# Patient Record
Sex: MALE | Race: BLACK OR AFRICAN AMERICAN | NOT HISPANIC OR LATINO | Employment: FULL TIME | URBAN - METROPOLITAN AREA
[De-identification: names, ages, dates, MRNs, and addresses within clinical notes are randomized per-mention and may not be internally consistent; named-entity substitution may affect disease eponyms.]

---

## 2023-03-23 ENCOUNTER — PATIENT OUTREACH (OUTPATIENT)
Dept: CARE COORDINATION | Facility: CLINIC | Age: 23
End: 2023-03-23
Payer: COMMERCIAL

## 2023-06-15 ENCOUNTER — PATIENT OUTREACH (OUTPATIENT)
Dept: CARE COORDINATION | Facility: CLINIC | Age: 23
End: 2023-06-15
Payer: COMMERCIAL

## 2023-06-15 NOTE — PROGRESS NOTES
Outreach call to patient to support a smooth transition of care from recent admission.  Left voicemail message for patient with my contact information.    Audrey Goyal RN/CM

## 2023-07-03 ENCOUNTER — PATIENT OUTREACH (OUTPATIENT)
Dept: CARE COORDINATION | Facility: CLINIC | Age: 23
End: 2023-07-03
Payer: COMMERCIAL

## 2023-07-03 SDOH — SOCIAL STABILITY: SOCIAL NETWORK: ARE YOU MARRIED, WIDOWED, DIVORCED, SEPARATED, NEVER MARRIED, OR LIVING WITH A PARTNER?: NEVER MARRIED

## 2023-07-03 SDOH — ECONOMIC STABILITY: TRANSPORTATION INSECURITY
IN THE PAST 12 MONTHS, HAS THE LACK OF TRANSPORTATION KEPT YOU FROM MEDICAL APPOINTMENTS OR FROM GETTING MEDICATIONS?: NO

## 2023-07-03 SDOH — ECONOMIC STABILITY: GENERAL: WOULD YOU LIKE HELP WITH ANY OF THE FOLLOWING NEEDS?: I DONT NEED HELP WITH ANY OF THESE

## 2023-07-03 NOTE — PROGRESS NOTES
[place]  [place]    [facility] [place] foot pain thought to be from overuse.  No acute findings other than soft tissue swelling.     Engagement  Call Start Time: 1447 (7/3/2023  2:47 PM)    Medications  Medications reviewed with patient/caregiver?: Yes (7/3/2023  2:47 PM)  Is the patient having any side effects they believe may be caused by any medication additions or changes?: No (7/3/2023  2:47 PM)  Does the patient have all medications ordered at discharge?: Yes (7/3/2023  2:47 PM)  Is the patient taking all medications as directed (includes completed medication regime)?: Yes (7/3/2023  2:47 PM)    Appointments  Does the patient have a primary care provider?: Yes (7/3/2023  2:47 PM)  Care Management Interventions: Verified appointment date/time/provider (7/3/2023  2:47 PM)  Has the patient kept scheduled appointments due by today?: No (7/3/2023  2:47 PM)  Care Management Interventions: Advised patient to keep appointment (7/3/2023  2:47 PM)    Patient Teaching  Does the patient have access to their discharge instructions?: Yes (7/3/2023  2:47 PM)  Care Management Interventions: Reviewed instructions with patient (7/3/2023  2:47 PM)  What is the patient's perception of their health status since discharge?: Improving (7/3/2023  2:47 PM)    Wrap Up  Call End Time: 1449 (7/3/2023  2:47 PM)      Outreach to patient for completion of transition of care.  Patient states he went to work today at "[employer],", but took desk duties due to foot injury.    educated patient to rest, elevate foot, ice, compress area with ace wrap or shoe, and to take Tylenol as prescribed..  Patient voiced understanding of above.     Maddie SHEPPARD RN CCM  RN Care Coordinator  [facility]  Phone [phone]

## 2023-07-19 ENCOUNTER — PATIENT OUTREACH (OUTPATIENT)
Dept: CARE COORDINATION | Facility: CLINIC | Age: 23
End: 2023-07-19
Payer: COMMERCIAL

## 2023-07-19 NOTE — PROGRESS NOTES
Outreach call to patient to support a smooth transition of care from recent admission.  Spoke with patient, he states he is doing good. Patient states his sickle cell is well controlled. Patient states, his pain is controlled. No needs at this time. Patient thanks  for calling.    Audrey Goyal RN/CM

## 2023-10-21 ENCOUNTER — HOSPITAL ENCOUNTER (EMERGENCY)
Facility: HOSPITAL | Age: 23
Discharge: HOME | End: 2023-10-21
Attending: EMERGENCY MEDICINE
Payer: COMMERCIAL

## 2023-10-21 ENCOUNTER — APPOINTMENT (OUTPATIENT)
Dept: RADIOLOGY | Facility: HOSPITAL | Age: 23
End: 2023-10-21
Payer: COMMERCIAL

## 2023-10-21 VITALS
SYSTOLIC BLOOD PRESSURE: 105 MMHG | DIASTOLIC BLOOD PRESSURE: 79 MMHG | TEMPERATURE: 98.6 F | RESPIRATION RATE: 18 BRPM | WEIGHT: 130 LBS | OXYGEN SATURATION: 97 % | HEIGHT: 73 IN | HEART RATE: 84 BPM | BODY MASS INDEX: 17.23 KG/M2

## 2023-10-21 DIAGNOSIS — D57.00 SICKLE CELL DISEASE WITH CRISIS (MULTI): Primary | ICD-10-CM

## 2023-10-21 LAB
ALBUMIN SERPL BCP-MCNC: 4.5 G/DL (ref 3.4–5)
ALP SERPL-CCNC: 88 U/L (ref 33–120)
ALT SERPL W P-5'-P-CCNC: 30 U/L (ref 10–52)
ANION GAP SERPL CALC-SCNC: 14 MMOL/L (ref 10–20)
AST SERPL W P-5'-P-CCNC: 67 U/L (ref 9–39)
BASO STIPL BLD QL SMEAR: PRESENT
BASOPHILS # BLD MANUAL: 0.35 X10*3/UL (ref 0–0.1)
BASOPHILS NFR BLD MANUAL: 2.6 %
BILIRUB SERPL-MCNC: 8.7 MG/DL (ref 0–1.2)
BUN SERPL-MCNC: 7 MG/DL (ref 6–23)
CALCIUM SERPL-MCNC: 9.4 MG/DL (ref 8.6–10.6)
CHLORIDE SERPL-SCNC: 107 MMOL/L (ref 98–107)
CO2 SERPL-SCNC: 22 MMOL/L (ref 21–32)
CREAT SERPL-MCNC: 0.63 MG/DL (ref 0.5–1.3)
EOSINOPHIL # BLD MANUAL: 0 X10*3/UL (ref 0–0.7)
EOSINOPHIL NFR BLD MANUAL: 0 %
ERYTHROCYTE [DISTWIDTH] IN BLOOD BY AUTOMATED COUNT: 27.4 % (ref 11.5–14.5)
GFR SERPL CREATININE-BSD FRML MDRD: >90 ML/MIN/1.73M*2
GLUCOSE SERPL-MCNC: 96 MG/DL (ref 74–99)
HCT VFR BLD AUTO: 19.5 % (ref 41–52)
HGB BLD-MCNC: 7.1 G/DL (ref 13.5–17.5)
HGB RETIC QN: 30 PG (ref 28–38)
IMM GRANULOCYTES # BLD AUTO: 0.08 X10*3/UL (ref 0–0.7)
IMM GRANULOCYTES NFR BLD AUTO: 0.6 % (ref 0–0.9)
IMMATURE RETIC FRACTION: 45 %
LDH SERPL L TO P-CCNC: 414 U/L (ref 84–246)
LYMPHOCYTES # BLD MANUAL: 4.59 X10*3/UL (ref 1.2–4.8)
LYMPHOCYTES NFR BLD MANUAL: 34.5 %
MCH RBC QN AUTO: 27 PG (ref 26–34)
MCHC RBC AUTO-ENTMCNC: 36.4 G/DL (ref 32–36)
MCV RBC AUTO: 74 FL (ref 80–100)
MONOCYTES # BLD MANUAL: 0.23 X10*3/UL (ref 0.1–1)
MONOCYTES NFR BLD MANUAL: 1.7 %
NEUTS SEG # BLD MANUAL: 8.02 X10*3/UL (ref 1.2–7)
NEUTS SEG NFR BLD MANUAL: 60.3 %
NRBC BLD-RTO: 3.3 /100 WBCS (ref 0–0)
PLATELET # BLD AUTO: 345 X10*3/UL (ref 150–450)
PMV BLD AUTO: 9.6 FL (ref 7.5–11.5)
POLYCHROMASIA BLD QL SMEAR: ABNORMAL
POTASSIUM SERPL-SCNC: 3.6 MMOL/L (ref 3.5–5.3)
PROT SERPL-MCNC: 6.7 G/DL (ref 6.4–8.2)
RBC # BLD AUTO: 2.63 X10*6/UL (ref 4.5–5.9)
RBC MORPH BLD: ABNORMAL
RETICS #: 0.33 X10*6/UL (ref 0.02–0.12)
RETICS/RBC NFR AUTO: 12.5 % (ref 0.5–2)
SICKLE CELLS BLD QL SMEAR: ABNORMAL
SODIUM SERPL-SCNC: 139 MMOL/L (ref 136–145)
TARGETS BLD QL SMEAR: ABNORMAL
TOTAL CELLS COUNTED BLD: 116
VARIANT LYMPHS # BLD MANUAL: 0.12 X10*3/UL (ref 0–0.5)
VARIANT LYMPHS NFR BLD: 0.9 %
WBC # BLD AUTO: 13.3 X10*3/UL (ref 4.4–11.3)

## 2023-10-21 PROCEDURE — 80053 COMPREHEN METABOLIC PANEL: CPT | Mod: CMCLAB

## 2023-10-21 PROCEDURE — 93005 ELECTROCARDIOGRAM TRACING: CPT

## 2023-10-21 PROCEDURE — 99284 EMERGENCY DEPT VISIT MOD MDM: CPT | Performed by: EMERGENCY MEDICINE

## 2023-10-21 PROCEDURE — 96375 TX/PRO/DX INJ NEW DRUG ADDON: CPT

## 2023-10-21 PROCEDURE — 71046 X-RAY EXAM CHEST 2 VIEWS: CPT

## 2023-10-21 PROCEDURE — 96376 TX/PRO/DX INJ SAME DRUG ADON: CPT

## 2023-10-21 PROCEDURE — 36415 COLL VENOUS BLD VENIPUNCTURE: CPT | Mod: CMCLAB

## 2023-10-21 PROCEDURE — 2500000001 HC RX 250 WO HCPCS SELF ADMINISTERED DRUGS (ALT 637 FOR MEDICARE OP)

## 2023-10-21 PROCEDURE — 2500000004 HC RX 250 GENERAL PHARMACY W/ HCPCS (ALT 636 FOR OP/ED)

## 2023-10-21 PROCEDURE — 83615 LACTATE (LD) (LDH) ENZYME: CPT | Mod: CMCLAB

## 2023-10-21 PROCEDURE — 96374 THER/PROPH/DIAG INJ IV PUSH: CPT

## 2023-10-21 PROCEDURE — 85007 BL SMEAR W/DIFF WBC COUNT: CPT

## 2023-10-21 PROCEDURE — 71046 X-RAY EXAM CHEST 2 VIEWS: CPT | Performed by: RADIOLOGY

## 2023-10-21 PROCEDURE — 85027 COMPLETE CBC AUTOMATED: CPT

## 2023-10-21 PROCEDURE — 85045 AUTOMATED RETICULOCYTE COUNT: CPT

## 2023-10-21 RX ORDER — HYDROMORPHONE HYDROCHLORIDE 1 MG/ML
1 INJECTION, SOLUTION INTRAMUSCULAR; INTRAVENOUS; SUBCUTANEOUS EVERY 30 MIN PRN
Status: DISCONTINUED | OUTPATIENT
Start: 2023-10-21 | End: 2023-10-21 | Stop reason: HOSPADM

## 2023-10-21 RX ORDER — DIPHENHYDRAMINE HCL 25 MG
25 CAPSULE ORAL ONCE
Status: COMPLETED | OUTPATIENT
Start: 2023-10-21 | End: 2023-10-21

## 2023-10-21 RX ORDER — ONDANSETRON HYDROCHLORIDE 2 MG/ML
4 INJECTION, SOLUTION INTRAVENOUS ONCE
Status: COMPLETED | OUTPATIENT
Start: 2023-10-21 | End: 2023-10-21

## 2023-10-21 RX ADMIN — HYDROMORPHONE HYDROCHLORIDE 1 MG: 1 INJECTION, SOLUTION INTRAMUSCULAR; INTRAVENOUS; SUBCUTANEOUS at 06:09

## 2023-10-21 RX ADMIN — ONDANSETRON 4 MG: 2 INJECTION INTRAMUSCULAR; INTRAVENOUS at 03:10

## 2023-10-21 RX ADMIN — HYDROMORPHONE HYDROCHLORIDE 1 MG: 1 INJECTION, SOLUTION INTRAMUSCULAR; INTRAVENOUS; SUBCUTANEOUS at 03:10

## 2023-10-21 RX ADMIN — DIPHENHYDRAMINE HYDROCHLORIDE 25 MG: 25 CAPSULE ORAL at 03:10

## 2023-10-21 ASSESSMENT — COLUMBIA-SUICIDE SEVERITY RATING SCALE - C-SSRS
1. IN THE PAST MONTH, HAVE YOU WISHED YOU WERE DEAD OR WISHED YOU COULD GO TO SLEEP AND NOT WAKE UP?: NO
6. HAVE YOU EVER DONE ANYTHING, STARTED TO DO ANYTHING, OR PREPARED TO DO ANYTHING TO END YOUR LIFE?: NO
2. HAVE YOU ACTUALLY HAD ANY THOUGHTS OF KILLING YOURSELF?: NO

## 2023-10-21 ASSESSMENT — PAIN - FUNCTIONAL ASSESSMENT: PAIN_FUNCTIONAL_ASSESSMENT: 0-10

## 2023-10-21 ASSESSMENT — LIFESTYLE VARIABLES
HAVE PEOPLE ANNOYED YOU BY CRITICIZING YOUR DRINKING: NO
HAVE YOU EVER FELT YOU SHOULD CUT DOWN ON YOUR DRINKING: NO
EVER HAD A DRINK FIRST THING IN THE MORNING TO STEADY YOUR NERVES TO GET RID OF A HANGOVER: NO
EVER FELT BAD OR GUILTY ABOUT YOUR DRINKING: NO
REASON UNABLE TO ASSESS: NO

## 2023-10-21 ASSESSMENT — PAIN SCALES - GENERAL
PAINLEVEL_OUTOF10: 5 - MODERATE PAIN
PAINLEVEL_OUTOF10: 10 - WORST POSSIBLE PAIN
PAINLEVEL_OUTOF10: 10 - WORST POSSIBLE PAIN

## 2023-10-21 ASSESSMENT — PAIN DESCRIPTION - DESCRIPTORS: DESCRIPTORS: JABBING
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
## 2023-10-21 NOTE — ED TRIAGE NOTES
Patient presents to the ED for sickle cell pain, endorsing pain to chest with stabbing pain that started a few hours ago. Also endorsing pain to back

## 2023-10-27 ENCOUNTER — CLINICAL SUPPORT (OUTPATIENT)
Dept: EMERGENCY MEDICINE | Facility: HOSPITAL | Age: 23
End: 2023-10-27
Payer: COMMERCIAL

## 2023-10-27 LAB
ATRIAL RATE: 85 BPM
P AXIS: 49 DEGREES
P OFFSET: 152 MS
P ONSET: 128 MS
PR INTERVAL: 168 MS
Q ONSET: 212 MS
QRS COUNT: 13 BEATS
QRS DURATION: 94 MS
QT INTERVAL: 370 MS
QTC CALCULATION(BAZETT): 440 MS
QTC FREDERICIA: 415 MS
R AXIS: 83 DEGREES
T AXIS: -61 DEGREES
T OFFSET: 397 MS
VENTRICULAR RATE: 85 BPM

## 2023-11-22 PROCEDURE — 96376 TX/PRO/DX INJ SAME DRUG ADON: CPT

## 2023-11-22 PROCEDURE — 99284 EMERGENCY DEPT VISIT MOD MDM: CPT | Mod: 25

## 2023-11-22 PROCEDURE — 99285 EMERGENCY DEPT VISIT HI MDM: CPT

## 2023-11-22 PROCEDURE — 96375 TX/PRO/DX INJ NEW DRUG ADDON: CPT

## 2023-11-22 PROCEDURE — 96374 THER/PROPH/DIAG INJ IV PUSH: CPT

## 2023-11-22 PROCEDURE — 99285 EMERGENCY DEPT VISIT HI MDM: CPT | Performed by: EMERGENCY MEDICINE

## 2023-11-23 ENCOUNTER — HOSPITAL ENCOUNTER (EMERGENCY)
Facility: HOSPITAL | Age: 23
Discharge: HOME | End: 2023-11-23
Payer: COMMERCIAL

## 2023-11-23 VITALS
RESPIRATION RATE: 18 BRPM | DIASTOLIC BLOOD PRESSURE: 67 MMHG | HEART RATE: 70 BPM | OXYGEN SATURATION: 98 % | SYSTOLIC BLOOD PRESSURE: 131 MMHG | BODY MASS INDEX: 18.61 KG/M2 | HEIGHT: 74 IN | TEMPERATURE: 99.8 F | WEIGHT: 145 LBS

## 2023-11-23 DIAGNOSIS — D57.00 SICKLE CELL PAIN CRISIS (MULTI): Primary | ICD-10-CM

## 2023-11-23 LAB
ALBUMIN SERPL BCP-MCNC: 4.4 G/DL (ref 3.4–5)
ALP SERPL-CCNC: 81 U/L (ref 33–120)
ALT SERPL W P-5'-P-CCNC: 23 U/L (ref 10–52)
ANION GAP SERPL CALC-SCNC: 12 MMOL/L (ref 10–20)
AST SERPL W P-5'-P-CCNC: 42 U/L (ref 9–39)
BASOPHILS # BLD MANUAL: 0 X10*3/UL (ref 0–0.1)
BASOPHILS NFR BLD MANUAL: 0 %
BILIRUB SERPL-MCNC: 5.6 MG/DL (ref 0–1.2)
BUN SERPL-MCNC: 9 MG/DL (ref 6–23)
CALCIUM SERPL-MCNC: 9.4 MG/DL (ref 8.6–10.6)
CHLORIDE SERPL-SCNC: 109 MMOL/L (ref 98–107)
CO2 SERPL-SCNC: 23 MMOL/L (ref 21–32)
CREAT SERPL-MCNC: 0.58 MG/DL (ref 0.5–1.3)
EOSINOPHIL # BLD MANUAL: 0.12 X10*3/UL (ref 0–0.7)
EOSINOPHIL NFR BLD MANUAL: 0.9 %
ERYTHROCYTE [DISTWIDTH] IN BLOOD BY AUTOMATED COUNT: 28 % (ref 11.5–14.5)
GFR SERPL CREATININE-BSD FRML MDRD: >90 ML/MIN/1.73M*2
GLUCOSE SERPL-MCNC: 82 MG/DL (ref 74–99)
HCT VFR BLD AUTO: 19.8 % (ref 41–52)
HGB BLD-MCNC: 7.2 G/DL (ref 13.5–17.5)
HGB RETIC QN: 28 PG (ref 28–38)
HYPOCHROMIA BLD QL SMEAR: ABNORMAL
IMM GRANULOCYTES # BLD AUTO: 0.07 X10*3/UL (ref 0–0.7)
IMM GRANULOCYTES NFR BLD AUTO: 0.5 % (ref 0–0.9)
IMMATURE RETIC FRACTION: 38.2 %
LDH SERPL L TO P-CCNC: 356 U/L (ref 84–246)
LYMPHOCYTES # BLD MANUAL: 8.03 X10*3/UL (ref 1.2–4.8)
LYMPHOCYTES NFR BLD MANUAL: 59.5 %
MCH RBC QN AUTO: 25.6 PG (ref 26–34)
MCHC RBC AUTO-ENTMCNC: 36.4 G/DL (ref 32–36)
MCV RBC AUTO: 71 FL (ref 80–100)
MONOCYTES # BLD MANUAL: 0.58 X10*3/UL (ref 0.1–1)
MONOCYTES NFR BLD MANUAL: 4.3 %
NEUTS SEG # BLD MANUAL: 4.77 X10*3/UL (ref 1.2–7)
NEUTS SEG NFR BLD MANUAL: 35.3 %
NRBC BLD-RTO: 0.4 /100 WBCS (ref 0–0)
PLATELET # BLD AUTO: 346 X10*3/UL (ref 150–450)
POLYCHROMASIA BLD QL SMEAR: ABNORMAL
POTASSIUM SERPL-SCNC: 3.6 MMOL/L (ref 3.5–5.3)
PROT SERPL-MCNC: 7.2 G/DL (ref 6.4–8.2)
RBC # BLD AUTO: 2.81 X10*6/UL (ref 4.5–5.9)
RBC MORPH BLD: ABNORMAL
RETICS #: 0.4 X10*6/UL (ref 0.02–0.12)
RETICS/RBC NFR AUTO: 14.4 % (ref 0.5–2)
SCHISTOCYTES BLD QL SMEAR: ABNORMAL
SICKLE CELLS BLD QL SMEAR: ABNORMAL
SODIUM SERPL-SCNC: 140 MMOL/L (ref 136–145)
TARGETS BLD QL SMEAR: ABNORMAL
TOTAL CELLS COUNTED BLD: 116
WBC # BLD AUTO: 13.5 X10*3/UL (ref 4.4–11.3)

## 2023-11-23 PROCEDURE — 36415 COLL VENOUS BLD VENIPUNCTURE: CPT | Performed by: STUDENT IN AN ORGANIZED HEALTH CARE EDUCATION/TRAINING PROGRAM

## 2023-11-23 PROCEDURE — 85045 AUTOMATED RETICULOCYTE COUNT: CPT | Performed by: STUDENT IN AN ORGANIZED HEALTH CARE EDUCATION/TRAINING PROGRAM

## 2023-11-23 PROCEDURE — 94760 N-INVAS EAR/PLS OXIMETRY 1: CPT

## 2023-11-23 PROCEDURE — 85027 COMPLETE CBC AUTOMATED: CPT | Performed by: STUDENT IN AN ORGANIZED HEALTH CARE EDUCATION/TRAINING PROGRAM

## 2023-11-23 PROCEDURE — 85007 BL SMEAR W/DIFF WBC COUNT: CPT | Performed by: STUDENT IN AN ORGANIZED HEALTH CARE EDUCATION/TRAINING PROGRAM

## 2023-11-23 PROCEDURE — 2500000004 HC RX 250 GENERAL PHARMACY W/ HCPCS (ALT 636 FOR OP/ED): Performed by: STUDENT IN AN ORGANIZED HEALTH CARE EDUCATION/TRAINING PROGRAM

## 2023-11-23 PROCEDURE — 83615 LACTATE (LD) (LDH) ENZYME: CPT | Performed by: STUDENT IN AN ORGANIZED HEALTH CARE EDUCATION/TRAINING PROGRAM

## 2023-11-23 PROCEDURE — 80053 COMPREHEN METABOLIC PANEL: CPT | Performed by: STUDENT IN AN ORGANIZED HEALTH CARE EDUCATION/TRAINING PROGRAM

## 2023-11-23 RX ORDER — KETOROLAC TROMETHAMINE 15 MG/ML
15 INJECTION, SOLUTION INTRAMUSCULAR; INTRAVENOUS ONCE
Status: COMPLETED | OUTPATIENT
Start: 2023-11-23 | End: 2023-11-23

## 2023-11-23 RX ORDER — HYDROMORPHONE HYDROCHLORIDE 1 MG/ML
1 INJECTION, SOLUTION INTRAMUSCULAR; INTRAVENOUS; SUBCUTANEOUS ONCE
Status: COMPLETED | OUTPATIENT
Start: 2023-11-23 | End: 2023-11-23

## 2023-11-23 RX ORDER — HYDROMORPHONE HYDROCHLORIDE 1 MG/ML
1 INJECTION, SOLUTION INTRAMUSCULAR; INTRAVENOUS; SUBCUTANEOUS
Status: COMPLETED | OUTPATIENT
Start: 2023-11-23 | End: 2023-11-23

## 2023-11-23 RX ADMIN — HYDROMORPHONE HYDROCHLORIDE 1 MG: 1 INJECTION, SOLUTION INTRAMUSCULAR; INTRAVENOUS; SUBCUTANEOUS at 03:55

## 2023-11-23 RX ADMIN — KETOROLAC TROMETHAMINE 15 MG: 15 INJECTION, SOLUTION INTRAMUSCULAR; INTRAVENOUS at 03:11

## 2023-11-23 RX ADMIN — HYDROMORPHONE HYDROCHLORIDE 1 MG: 1 INJECTION, SOLUTION INTRAMUSCULAR; INTRAVENOUS; SUBCUTANEOUS at 02:00

## 2023-11-23 RX ADMIN — HYDROMORPHONE HYDROCHLORIDE 1 MG: 1 INJECTION, SOLUTION INTRAMUSCULAR; INTRAVENOUS; SUBCUTANEOUS at 02:32

## 2023-11-23 RX ADMIN — HYDROMORPHONE HYDROCHLORIDE 1 MG: 1 INJECTION, SOLUTION INTRAMUSCULAR; INTRAVENOUS; SUBCUTANEOUS at 01:32

## 2023-11-23 ASSESSMENT — PAIN - FUNCTIONAL ASSESSMENT
PAIN_FUNCTIONAL_ASSESSMENT: 0-10

## 2023-11-23 ASSESSMENT — COLUMBIA-SUICIDE SEVERITY RATING SCALE - C-SSRS
1. IN THE PAST MONTH, HAVE YOU WISHED YOU WERE DEAD OR WISHED YOU COULD GO TO SLEEP AND NOT WAKE UP?: NO
2. HAVE YOU ACTUALLY HAD ANY THOUGHTS OF KILLING YOURSELF?: NO
6. HAVE YOU EVER DONE ANYTHING, STARTED TO DO ANYTHING, OR PREPARED TO DO ANYTHING TO END YOUR LIFE?: NO

## 2023-11-23 ASSESSMENT — PAIN DESCRIPTION - LOCATION: LOCATION: KNEE

## 2023-11-23 NOTE — ED TRIAGE NOTES
Pt states that he is having pain in his left knee related to his sickle cell. Pt denies any chest pain or sob.

## 2023-11-23 NOTE — ED PROVIDER NOTES
HPI  Patient is a 23-year-old male presented to the emergency department for sickle cell pain crisis.  Reports that he has been feeling it in his left knee she is where he typically feels it.  Denies any chest pain, shortness of breath, or subjective fevers.  Unable to control with home and urgency unfortunately.    Physical Exam  VITALS: Vital signs reviewed in nursing triage note, EMR flow sheets, and at patient's bedside  CONSTITUTIONAL: Well-appearing, in no apparent distress  HEAD: NCAT  EYES: PERRL, EOMI  NECK: Supple, non-tender, full ROM  CARD: RRR, no m/r/g, no JVD  RESP: LCTAB, speaking full sentences, no increased work of breathing, no use of accessory respiratory muscles  ABD: Bowel sounds present, non-distended, soft and non-tender, no palpable organomegaly, no masses, no guarding or rebound tenderness  BACK: No vertebral point or CVA tenderness, no obvious bony deformities, no spinal step-offs   EXT: Normal ROM in all four extremities, 2+ radial and DP pulses bilaterally, no edema  SKIN: Dry with appropriate turgor, no apparent rashes, suspicious lesions, or masses   NEURO: CNs II-XII grossly intact, AAOx4, sensation intact bilaterally, strength 5/5 on UEs and LEs bilaterally, speech is fluent and comprehensible  PSYCH: Appropriate mood and affect, no HI/SI, not responding to internal stimuli    Vitals:    11/23/23 0012   BP: 120/61   Pulse: 68   Resp: 16   Temp: 37.7 °C (99.8 °F)   SpO2: 93%       Assessment/Plan/MDM  Patient clinically stable with normal vital signs upon presentation to the emergency department.  No concerns for acute chest interim at this time.  Given his lack of chest pain, shortness of breath, or subjective fevers, do not believe that EKG and chest x-ray are indicated.  Will obtain sickle cell laboratory workup to evaluate the degree of hemolysis as well as his bone marrow response.  Provided with Dilaudid 1 mg IV every 30 minutes x 3 doses per his care plan.  Ultimate disposition  will be contingent on labs as well as patient response to pain medications.    Results  *See section(s) entitled ``Lab Results´´, ``Diagnostic Imaging Results Review´´ for entirety.  Notable results listed below  - Labs: I independently interpreted as , hemoglobin 7.2, reticulocyte percent 14.4    Diagnoses as of 11/23/23 0429   Sickle cell pain crisis (CMS/[facility])        ED Course/Progress  Patient remains clinically stable.  Appropriate bone marrow response.  Patient felt improved following 4 doses as well as Toradol 15 mg IV.  Given his improvement, we will plan on discharging in stable condition at this time.    Clinical Impression  Sickle pain crisis    Dispo  Discharge home    Home: I discussed the differential, results and discharge plan with the patient and/or family/friend/caregiver if present. I emphasized the importance of follow-up with the physician I referred them to in the timeframe recommended. I explained reasons for the patient to return to the Emergency Department. Questions were addressed. They understand return precautions and discharge instructions. The patient and/or family/friend/caregiver expressed understanding and agreement with assessment/plan.     Patient seen and discussed with attending physician Dr. Ulloa.    Evaristo Jim MD  Emergency Medicine, PGY-3    Was dictated using Dragon dictation. Please excuse any errors found in the note.    Evaristo Jim MD  Resident  11/23/23 0438    --------------------------------------    This patient was seen by the resident physician. I have seen and examined the patient, agree with the workup, evaluation, management and diagnosis. The care plan has been discussed and I concur.    My assessment reveals the following:    HPI: Patient is a 22 y/o male with h/o sickle cell disease presenting with pain crisis. C/o typical pain in left knee. No F/C/CP/SOB/N/V/abd pain. Has h/o acute chest, but does not feel like that currently. No  cough. Does not have narcotic meds at home.     PE:  Vital signs reviewed in nursing triage note, EMR flowsheets, and at patient's bedside  GEN: Patient is awake, alert, calm, cooperative, and in mild to moderate painful distress.  HEAD: Normocephalic and atraumatic.  EYES: Anicteric sclera.  MOUTH: Mucous membranes moist.  CV: Regular rate and rhythm. (+) s1/s2. No murmurs/rubs/gallops.  PULM: CTAB. No wheezes, rales, or crackles.  GI: Soft, non-tender, non-distended without rebound or guarding.  EXT: No deformities noted.   NEURO: Moves all extremities.  SKIN: Warm, dry. No erythema or ecchymosis.    Labs Reviewed   CBC WITH AUTO DIFFERENTIAL - Abnormal       Result Value    WBC 13.5 (*)     nRBC 0.4 (*)     RBC 2.81 (*)     Hemoglobin 7.2 (*)     Hematocrit 19.8 (*)     MCV 71 (*)     MCH 25.6 (*)     MCHC 36.4 (*)     RDW 28.0 (*)     Platelets 346      Immature Granulocytes %, Automated 0.5      Immature Granulocytes Absolute, Automated 0.07      Narrative:     The previously reported component Neutrophils % is no longer being reported.  The previously reported component Lymphocytes % is no longer being reported.  The previously reported component Monocytes % is no longer being reported.  The previously   reported component Eosinophils % is no longer being reported.  The previously reported component Basophils % is no longer being reported.  The previously reported component Absolute Neutrophils is no longer being reported.  The previously reported   component Absolute Lymphocytes is no longer being reported.  The previously reported component Absolute Monocytes is no longer being reported.  The previously reported component Absolute Eosinophils is no longer being reported.  The previously reported   component Absolute Basophils is no longer being reported.   COMPREHENSIVE METABOLIC PANEL - Abnormal    Glucose 82      Sodium 140      Potassium 3.6      Chloride 109 (*)     Bicarbonate 23      Anion Gap 12       Urea Nitrogen 9      Creatinine 0.58      eGFR >90      Calcium 9.4      Albumin 4.4      Alkaline Phosphatase 81      Total Protein 7.2      AST 42 (*)     Bilirubin, Total 5.6 (*)     ALT 23     LACTATE DEHYDROGENASE - Abnormal     (*)    RETICULOCYTES - Abnormal    Retic % 14.4 (*)     Retic Absolute 0.404 (*)     Reticulocyte Hemoglobin 28      Immature Retic fraction 38.2 (*)    MANUAL DIFFERENTIAL - Abnormal    Neutrophils %, Manual 35.3      Lymphocytes %, Manual 59.5      Monocytes %, Manual 4.3      Eosinophils %, Manual 0.9      Basophils %, Manual 0.0      Seg Neutrophils Absolute, Manual 4.77      Lymphocytes Absolute, Manual 8.03 (*)     Monocytes Absolute, Manual 0.58      Eosinophils Absolute, Manual 0.12      Basophils Absolute, Manual 0.00      Total Cells Counted 116      RBC Morphology See Below      Polychromasia Mild      Hypochromia Mild      RBC Fragments Few      Sickle Cells Many      Target Cells Many       Medical Decision Making:  - IV  - Labs  - Pain meds  - Given Toradol IV and 3 doses of Dilaudid IV.  - Given option of admission vs discharge after one more dose of Dilaudid if desired. Patient elects latter choice.  - Follow up with sickle cell doctor.  - Patient advised to return to the ED for any worsening or new symptoms.     EKG: EKG independently reviewed by the attending ED physician, and I and concur with the resident's/advanced practice provider's interpretation. Sinus rhythm at 73 bpm, normal axis, normal intervals, no ST or T wave changes.    Differential Diagnoses Considered: Sickle cell pain crisis    Chronic Medical Conditions Significantly Affecting Care: Sickle cell disease.     Escalation of Care:  Appropriate for outpatient management    MD Elias Silva MD  11/23/23 2125

## 2023-11-24 ENCOUNTER — CLINICAL SUPPORT (OUTPATIENT)
Dept: EMERGENCY MEDICINE | Facility: HOSPITAL | Age: 23
End: 2023-11-24
Payer: COMMERCIAL

## 2023-11-24 LAB
ATRIAL RATE: 73 BPM
P AXIS: 59 DEGREES
P OFFSET: 174 MS
P ONSET: 116 MS
PR INTERVAL: 200 MS
Q ONSET: 216 MS
QRS COUNT: 12 BEATS
QRS DURATION: 104 MS
QT INTERVAL: 356 MS
QTC CALCULATION(BAZETT): 392 MS
QTC FREDERICIA: 380 MS
R AXIS: 85 DEGREES
T AXIS: 26 DEGREES
T OFFSET: 394 MS
VENTRICULAR RATE: 73 BPM

## 2023-11-27 ENCOUNTER — HOSPITAL ENCOUNTER (INPATIENT)
Facility: HOSPITAL | Age: 23
LOS: 3 days | Discharge: HOME | DRG: 803 | End: 2023-12-01
Attending: EMERGENCY MEDICINE | Admitting: INTERNAL MEDICINE
Payer: COMMERCIAL

## 2023-11-27 DIAGNOSIS — G47.34 NOCTURNAL HYPOXIA: ICD-10-CM

## 2023-11-27 DIAGNOSIS — K80.20 CALCULUS OF GALLBLADDER WITHOUT CHOLECYSTITIS WITHOUT OBSTRUCTION: ICD-10-CM

## 2023-11-27 DIAGNOSIS — D57.00 SICKLE CELL PAIN CRISIS (MULTI): Primary | ICD-10-CM

## 2023-11-27 DIAGNOSIS — R59.1 LYMPHADENOPATHY: ICD-10-CM

## 2023-11-27 LAB
ALBUMIN SERPL BCP-MCNC: 5.1 G/DL (ref 3.4–5)
ALP SERPL-CCNC: 105 U/L (ref 33–120)
ALT SERPL W P-5'-P-CCNC: 53 U/L (ref 10–52)
ANION GAP SERPL CALC-SCNC: 14 MMOL/L (ref 10–20)
AST SERPL W P-5'-P-CCNC: 49 U/L (ref 9–39)
BASOPHILS # BLD MANUAL: 0 X10*3/UL (ref 0–0.1)
BASOPHILS NFR BLD MANUAL: 0 %
BILIRUB SERPL-MCNC: 6.2 MG/DL (ref 0–1.2)
BUN SERPL-MCNC: 12 MG/DL (ref 6–23)
CALCIUM SERPL-MCNC: 9.8 MG/DL (ref 8.6–10.6)
CHLORIDE SERPL-SCNC: 107 MMOL/L (ref 98–107)
CO2 SERPL-SCNC: 22 MMOL/L (ref 21–32)
CREAT SERPL-MCNC: 0.65 MG/DL (ref 0.5–1.3)
EOSINOPHIL # BLD MANUAL: 0.26 X10*3/UL (ref 0–0.7)
EOSINOPHIL NFR BLD MANUAL: 1.8 %
ERYTHROCYTE [DISTWIDTH] IN BLOOD BY AUTOMATED COUNT: 28.1 % (ref 11.5–14.5)
GFR SERPL CREATININE-BSD FRML MDRD: >90 ML/MIN/1.73M*2
GLUCOSE SERPL-MCNC: 79 MG/DL (ref 74–99)
HCT VFR BLD AUTO: 21.5 % (ref 41–52)
HGB BLD-MCNC: 7.6 G/DL (ref 13.5–17.5)
HGB RETIC QN: 29 PG (ref 28–38)
HOWELL-JOLLY BOD BLD QL SMEAR: PRESENT
HYPOCHROMIA BLD QL SMEAR: ABNORMAL
IMM GRANULOCYTES # BLD AUTO: 0.1 X10*3/UL (ref 0–0.7)
IMM GRANULOCYTES NFR BLD AUTO: 0.7 % (ref 0–0.9)
IMMATURE RETIC FRACTION: 40.3 %
LDH SERPL L TO P-CCNC: 383 U/L (ref 84–246)
LYMPHOCYTES # BLD MANUAL: 4.39 X10*3/UL (ref 1.2–4.8)
LYMPHOCYTES NFR BLD MANUAL: 30.7 %
MCH RBC QN AUTO: 24.9 PG (ref 26–34)
MCHC RBC AUTO-ENTMCNC: 35.3 G/DL (ref 32–36)
MCV RBC AUTO: 71 FL (ref 80–100)
MONOCYTES # BLD MANUAL: 0.87 X10*3/UL (ref 0.1–1)
MONOCYTES NFR BLD MANUAL: 6.1 %
NEUTS SEG # BLD MANUAL: 8.52 X10*3/UL (ref 1.2–7)
NEUTS SEG NFR BLD MANUAL: 59.6 %
NRBC BLD MANUAL-RTO: 1.8 % (ref 0–0)
NRBC BLD-RTO: 0.4 /100 WBCS (ref 0–0)
PAPPENHEIMER BOD BLD QL SMEAR: PRESENT
PLATELET # BLD AUTO: 331 X10*3/UL (ref 150–450)
POLYCHROMASIA BLD QL SMEAR: ABNORMAL
POTASSIUM SERPL-SCNC: 4.4 MMOL/L (ref 3.5–5.3)
PROT SERPL-MCNC: 7.2 G/DL (ref 6.4–8.2)
RBC # BLD AUTO: 3.05 X10*6/UL (ref 4.5–5.9)
RBC MORPH BLD: ABNORMAL
RETICS #: 0.43 X10*6/UL (ref 0.02–0.12)
RETICS/RBC NFR AUTO: 14 % (ref 0.5–2)
SCHISTOCYTES BLD QL SMEAR: ABNORMAL
SICKLE CELLS BLD QL SMEAR: ABNORMAL
SODIUM SERPL-SCNC: 139 MMOL/L (ref 136–145)
TARGETS BLD QL SMEAR: ABNORMAL
TOTAL CELLS COUNTED BLD: 114
VARIANT LYMPHS # BLD MANUAL: 0.26 X10*3/UL (ref 0–0.5)
VARIANT LYMPHS NFR BLD: 1.8 %
WBC # BLD AUTO: 14.3 X10*3/UL (ref 4.4–11.3)

## 2023-11-27 PROCEDURE — 93005 ELECTROCARDIOGRAM TRACING: CPT

## 2023-11-27 PROCEDURE — 83021 HEMOGLOBIN CHROMOTOGRAPHY: CPT

## 2023-11-27 PROCEDURE — 85027 COMPLETE CBC AUTOMATED: CPT

## 2023-11-27 PROCEDURE — 2500000004 HC RX 250 GENERAL PHARMACY W/ HCPCS (ALT 636 FOR OP/ED)

## 2023-11-27 PROCEDURE — 80053 COMPREHEN METABOLIC PANEL: CPT

## 2023-11-27 PROCEDURE — 99285 EMERGENCY DEPT VISIT HI MDM: CPT | Performed by: EMERGENCY MEDICINE

## 2023-11-27 PROCEDURE — 85045 AUTOMATED RETICULOCYTE COUNT: CPT

## 2023-11-27 PROCEDURE — 36415 COLL VENOUS BLD VENIPUNCTURE: CPT

## 2023-11-27 PROCEDURE — 83020 HEMOGLOBIN ELECTROPHORESIS: CPT

## 2023-11-27 PROCEDURE — 85007 BL SMEAR W/DIFF WBC COUNT: CPT

## 2023-11-27 PROCEDURE — 83615 LACTATE (LD) (LDH) ENZYME: CPT

## 2023-11-27 PROCEDURE — 96376 TX/PRO/DX INJ SAME DRUG ADON: CPT

## 2023-11-27 PROCEDURE — 96374 THER/PROPH/DIAG INJ IV PUSH: CPT

## 2023-11-27 PROCEDURE — 96375 TX/PRO/DX INJ NEW DRUG ADDON: CPT

## 2023-11-27 RX ORDER — KETOROLAC TROMETHAMINE 15 MG/ML
15 INJECTION, SOLUTION INTRAMUSCULAR; INTRAVENOUS ONCE
Status: COMPLETED | OUTPATIENT
Start: 2023-11-27 | End: 2023-11-27

## 2023-11-27 RX ORDER — HYDROMORPHONE HYDROCHLORIDE 1 MG/ML
1 INJECTION, SOLUTION INTRAMUSCULAR; INTRAVENOUS; SUBCUTANEOUS
Status: COMPLETED | OUTPATIENT
Start: 2023-11-27 | End: 2023-11-27

## 2023-11-27 RX ADMIN — HYDROMORPHONE HYDROCHLORIDE 1 MG: 1 INJECTION, SOLUTION INTRAMUSCULAR; INTRAVENOUS; SUBCUTANEOUS at 19:47

## 2023-11-27 RX ADMIN — HYDROMORPHONE HYDROCHLORIDE 1 MG: 1 INJECTION, SOLUTION INTRAMUSCULAR; INTRAVENOUS; SUBCUTANEOUS at 21:58

## 2023-11-27 RX ADMIN — HYDROMORPHONE HYDROCHLORIDE 1 MG: 1 INJECTION, SOLUTION INTRAMUSCULAR; INTRAVENOUS; SUBCUTANEOUS at 20:15

## 2023-11-27 RX ADMIN — KETOROLAC TROMETHAMINE 15 MG: 15 INJECTION, SOLUTION INTRAMUSCULAR; INTRAVENOUS at 22:39

## 2023-11-27 ASSESSMENT — LIFESTYLE VARIABLES
HAVE YOU EVER FELT YOU SHOULD CUT DOWN ON YOUR DRINKING: NO
EVER FELT BAD OR GUILTY ABOUT YOUR DRINKING: NO
REASON UNABLE TO ASSESS: NO
EVER HAD A DRINK FIRST THING IN THE MORNING TO STEADY YOUR NERVES TO GET RID OF A HANGOVER: NO
HAVE PEOPLE ANNOYED YOU BY CRITICIZING YOUR DRINKING: NO

## 2023-11-27 ASSESSMENT — COLUMBIA-SUICIDE SEVERITY RATING SCALE - C-SSRS
6. HAVE YOU EVER DONE ANYTHING, STARTED TO DO ANYTHING, OR PREPARED TO DO ANYTHING TO END YOUR LIFE?: NO
2. HAVE YOU ACTUALLY HAD ANY THOUGHTS OF KILLING YOURSELF?: NO
1. IN THE PAST MONTH, HAVE YOU WISHED YOU WERE DEAD OR WISHED YOU COULD GO TO SLEEP AND NOT WAKE UP?: NO

## 2023-11-27 ASSESSMENT — PAIN SCALES - GENERAL
PAINLEVEL_OUTOF10: 10 - WORST POSSIBLE PAIN
PAINLEVEL_OUTOF10: 8
PAINLEVEL_OUTOF10: 8
PAINLEVEL_OUTOF10: 10 - WORST POSSIBLE PAIN
PAINLEVEL_OUTOF10: 8
PAINLEVEL_OUTOF10: 10 - WORST POSSIBLE PAIN

## 2023-11-27 NOTE — ED TRIAGE NOTES
Pt presents to the ED for SCC. Pt states that he is having pain in his R leg and foot. Denies CP and SOB.

## 2023-11-27 NOTE — Clinical Note
L RP biopsy completed. 5 core samples obtained, gel foam, 2x2, and tegaderm dressing applied to left low back, dressing c/d/I. Pt received 2mg versed and 50mcg fentanyl for procedure. VSS. Pt transferred to CU. RPCU RN given report.

## 2023-11-28 ENCOUNTER — ANCILLARY PROCEDURE (OUTPATIENT)
Dept: EMERGENCY MEDICINE | Facility: HOSPITAL | Age: 23
DRG: 803 | End: 2023-11-28
Payer: COMMERCIAL

## 2023-11-28 ENCOUNTER — APPOINTMENT (OUTPATIENT)
Dept: RADIOLOGY | Facility: HOSPITAL | Age: 23
DRG: 803 | End: 2023-11-28
Payer: COMMERCIAL

## 2023-11-28 LAB
ABO GROUP (TYPE) IN BLOOD: NORMAL
ALBUMIN SERPL BCP-MCNC: 4.2 G/DL (ref 3.4–5)
ALBUMIN SERPL BCP-MCNC: 4.4 G/DL (ref 3.4–5)
ALP SERPL-CCNC: 100 U/L (ref 33–120)
ALP SERPL-CCNC: 95 U/L (ref 33–120)
ALT SERPL W P-5'-P-CCNC: 41 U/L (ref 10–52)
ALT SERPL W P-5'-P-CCNC: 45 U/L (ref 10–52)
AMPHETAMINES UR QL SCN: ABNORMAL
ANION GAP SERPL CALC-SCNC: 11 MMOL/L (ref 10–20)
ANION GAP SERPL CALC-SCNC: 13 MMOL/L (ref 10–20)
ANTIBODY SCREEN: NORMAL
APTT PPP: 27 SECONDS (ref 27–38)
AST SERPL W P-5'-P-CCNC: 45 U/L (ref 9–39)
AST SERPL W P-5'-P-CCNC: 47 U/L (ref 9–39)
ATRIAL RATE: 82 BPM
BARBITURATES UR QL SCN: ABNORMAL
BASOPHILS # BLD AUTO: 0.07 X10*3/UL (ref 0–0.1)
BASOPHILS NFR BLD AUTO: 0.6 %
BENZODIAZ UR QL SCN: ABNORMAL
BILIRUB DIRECT SERPL-MCNC: 0.5 MG/DL (ref 0–0.3)
BILIRUB SERPL-MCNC: 5.3 MG/DL (ref 0–1.2)
BILIRUB SERPL-MCNC: 5.5 MG/DL (ref 0–1.2)
BUN SERPL-MCNC: 10 MG/DL (ref 6–23)
BUN SERPL-MCNC: 9 MG/DL (ref 6–23)
BZE UR QL SCN: ABNORMAL
CALCIUM SERPL-MCNC: 9.1 MG/DL (ref 8.6–10.6)
CALCIUM SERPL-MCNC: 9.3 MG/DL (ref 8.6–10.6)
CANNABINOIDS UR QL SCN: ABNORMAL
CHLORIDE SERPL-SCNC: 107 MMOL/L (ref 98–107)
CHLORIDE SERPL-SCNC: 108 MMOL/L (ref 98–107)
CO2 SERPL-SCNC: 23 MMOL/L (ref 21–32)
CO2 SERPL-SCNC: 24 MMOL/L (ref 21–32)
CREAT SERPL-MCNC: 0.56 MG/DL (ref 0.5–1.3)
CREAT SERPL-MCNC: 0.77 MG/DL (ref 0.5–1.3)
EOSINOPHIL # BLD AUTO: 0.47 X10*3/UL (ref 0–0.7)
EOSINOPHIL NFR BLD AUTO: 3.7 %
ERYTHROCYTE [DISTWIDTH] IN BLOOD BY AUTOMATED COUNT: 28.2 % (ref 11.5–14.5)
FENTANYL+NORFENTANYL UR QL SCN: ABNORMAL
GFR SERPL CREATININE-BSD FRML MDRD: >90 ML/MIN/1.73M*2
GFR SERPL CREATININE-BSD FRML MDRD: >90 ML/MIN/1.73M*2
GLUCOSE SERPL-MCNC: 86 MG/DL (ref 74–99)
GLUCOSE SERPL-MCNC: 90 MG/DL (ref 74–99)
HCT VFR BLD AUTO: 19.5 % (ref 41–52)
HGB BLD-MCNC: 7.1 G/DL (ref 13.5–17.5)
HGB RETIC QN: 26 PG (ref 28–38)
HYPOCHROMIA BLD QL SMEAR: NORMAL
IMM GRANULOCYTES # BLD AUTO: 0.08 X10*3/UL (ref 0–0.7)
IMM GRANULOCYTES NFR BLD AUTO: 0.6 % (ref 0–0.9)
IMMATURE RETIC FRACTION: 46.5 %
INR PPP: 1.3 (ref 0.9–1.1)
LYMPHOCYTES # BLD AUTO: 5.55 X10*3/UL (ref 1.2–4.8)
LYMPHOCYTES NFR BLD AUTO: 44 %
MAGNESIUM SERPL-MCNC: 2.02 MG/DL (ref 1.6–2.4)
MCH RBC QN AUTO: 25.5 PG (ref 26–34)
MCHC RBC AUTO-ENTMCNC: 36.4 G/DL (ref 32–36)
MCV RBC AUTO: 70 FL (ref 80–100)
MONOCYTES # BLD AUTO: 1.42 X10*3/UL (ref 0.1–1)
MONOCYTES NFR BLD AUTO: 11.3 %
NEUTROPHILS # BLD AUTO: 5.01 X10*3/UL (ref 1.2–7.7)
NEUTROPHILS NFR BLD AUTO: 39.8 %
NRBC BLD-RTO: 0.6 /100 WBCS (ref 0–0)
OPIATES UR QL SCN: ABNORMAL
OXYCODONE+OXYMORPHONE UR QL SCN: ABNORMAL
P AXIS: 69 DEGREES
P OFFSET: 182 MS
P ONSET: 130 MS
PCP UR QL SCN: ABNORMAL
PHOSPHATE SERPL-MCNC: 5.3 MG/DL (ref 2.5–4.9)
PLATELET # BLD AUTO: 298 X10*3/UL (ref 150–450)
POLYCHROMASIA BLD QL SMEAR: NORMAL
POTASSIUM SERPL-SCNC: 4.1 MMOL/L (ref 3.5–5.3)
POTASSIUM SERPL-SCNC: 4.2 MMOL/L (ref 3.5–5.3)
PR INTERVAL: 174 MS
PROT SERPL-MCNC: 6.4 G/DL (ref 6.4–8.2)
PROT SERPL-MCNC: 6.4 G/DL (ref 6.4–8.2)
PROTHROMBIN TIME: 15 SECONDS (ref 9.8–12.8)
Q ONSET: 217 MS
QRS COUNT: 13 BEATS
QRS DURATION: 100 MS
QT INTERVAL: 352 MS
QTC CALCULATION(BAZETT): 411 MS
QTC FREDERICIA: 390 MS
R AXIS: 80 DEGREES
RBC # BLD AUTO: 2.78 X10*6/UL (ref 4.5–5.9)
RBC MORPH BLD: NORMAL
RETICS #: 0.34 X10*6/UL (ref 0.02–0.12)
RETICS/RBC NFR AUTO: 13 % (ref 0.5–2)
RH FACTOR (ANTIGEN D): NORMAL
SCHISTOCYTES BLD QL SMEAR: NORMAL
SICKLE CELLS BLD QL SMEAR: NORMAL
SODIUM SERPL-SCNC: 139 MMOL/L (ref 136–145)
SODIUM SERPL-SCNC: 139 MMOL/L (ref 136–145)
T AXIS: 40 DEGREES
T OFFSET: 393 MS
TARGETS BLD QL SMEAR: NORMAL
URATE SERPL-MCNC: 5.4 MG/DL (ref 4–7.5)
VENTRICULAR RATE: 82 BPM
WBC # BLD AUTO: 12.6 X10*3/UL (ref 4.4–11.3)

## 2023-11-28 PROCEDURE — 85610 PROTHROMBIN TIME: CPT

## 2023-11-28 PROCEDURE — 2500000004 HC RX 250 GENERAL PHARMACY W/ HCPCS (ALT 636 FOR OP/ED)

## 2023-11-28 PROCEDURE — 82248 BILIRUBIN DIRECT: CPT

## 2023-11-28 PROCEDURE — 71045 X-RAY EXAM CHEST 1 VIEW: CPT | Performed by: RADIOLOGY

## 2023-11-28 PROCEDURE — 80053 COMPREHEN METABOLIC PANEL: CPT

## 2023-11-28 PROCEDURE — 36415 COLL VENOUS BLD VENIPUNCTURE: CPT

## 2023-11-28 PROCEDURE — 85025 COMPLETE CBC W/AUTO DIFF WBC: CPT

## 2023-11-28 PROCEDURE — 93005 ELECTROCARDIOGRAM TRACING: CPT

## 2023-11-28 PROCEDURE — 2500000001 HC RX 250 WO HCPCS SELF ADMINISTERED DRUGS (ALT 637 FOR MEDICARE OP)

## 2023-11-28 PROCEDURE — 85045 AUTOMATED RETICULOCYTE COUNT: CPT

## 2023-11-28 PROCEDURE — 71045 X-RAY EXAM CHEST 1 VIEW: CPT | Mod: FY

## 2023-11-28 PROCEDURE — 83735 ASSAY OF MAGNESIUM: CPT

## 2023-11-28 PROCEDURE — 2550000001 HC RX 255 CONTRASTS: Performed by: INTERNAL MEDICINE

## 2023-11-28 PROCEDURE — 86920 COMPATIBILITY TEST SPIN: CPT

## 2023-11-28 PROCEDURE — 74177 CT ABD & PELVIS W/CONTRAST: CPT

## 2023-11-28 PROCEDURE — 84100 ASSAY OF PHOSPHORUS: CPT

## 2023-11-28 PROCEDURE — 99223 1ST HOSP IP/OBS HIGH 75: CPT | Performed by: STUDENT IN AN ORGANIZED HEALTH CARE EDUCATION/TRAINING PROGRAM

## 2023-11-28 PROCEDURE — 74177 CT ABD & PELVIS W/CONTRAST: CPT | Performed by: RADIOLOGY

## 2023-11-28 PROCEDURE — 86901 BLOOD TYPING SEROLOGIC RH(D): CPT

## 2023-11-28 PROCEDURE — 1200000002 HC GENERAL ROOM WITH TELEMETRY DAILY

## 2023-11-28 PROCEDURE — 84550 ASSAY OF BLOOD/URIC ACID: CPT | Performed by: NURSE PRACTITIONER

## 2023-11-28 PROCEDURE — 80307 DRUG TEST PRSMV CHEM ANLYZR: CPT

## 2023-11-28 RX ORDER — ACETAMINOPHEN 325 MG/1
975 TABLET ORAL EVERY 6 HOURS PRN
Status: DISCONTINUED | OUTPATIENT
Start: 2023-11-28 | End: 2023-11-28

## 2023-11-28 RX ORDER — ONDANSETRON 4 MG/1
4 TABLET, FILM COATED ORAL EVERY 8 HOURS PRN
Status: DISCONTINUED | OUTPATIENT
Start: 2023-11-28 | End: 2023-12-01 | Stop reason: HOSPADM

## 2023-11-28 RX ORDER — ENOXAPARIN SODIUM 100 MG/ML
40 INJECTION SUBCUTANEOUS EVERY 24 HOURS
Status: DISCONTINUED | OUTPATIENT
Start: 2023-11-28 | End: 2023-12-01 | Stop reason: HOSPADM

## 2023-11-28 RX ORDER — KETOROLAC TROMETHAMINE 30 MG/ML
30 INJECTION, SOLUTION INTRAMUSCULAR; INTRAVENOUS EVERY 6 HOURS SCHEDULED
Status: DISCONTINUED | OUTPATIENT
Start: 2023-11-28 | End: 2023-11-28

## 2023-11-28 RX ORDER — PANTOPRAZOLE SODIUM 40 MG/1
40 TABLET, DELAYED RELEASE ORAL
Status: DISCONTINUED | OUTPATIENT
Start: 2023-11-28 | End: 2023-11-28

## 2023-11-28 RX ORDER — AMOXICILLIN 250 MG
2 CAPSULE ORAL 2 TIMES DAILY
Status: DISCONTINUED | OUTPATIENT
Start: 2023-11-28 | End: 2023-12-01 | Stop reason: HOSPADM

## 2023-11-28 RX ORDER — DIPHENHYDRAMINE HCL 25 MG
25 CAPSULE ORAL EVERY 6 HOURS PRN
Status: DISCONTINUED | OUTPATIENT
Start: 2023-11-28 | End: 2023-12-01 | Stop reason: HOSPADM

## 2023-11-28 RX ORDER — KETOROLAC TROMETHAMINE 30 MG/ML
30 INJECTION, SOLUTION INTRAMUSCULAR; INTRAVENOUS EVERY 6 HOURS
Status: COMPLETED | OUTPATIENT
Start: 2023-11-28 | End: 2023-11-29

## 2023-11-28 RX ORDER — HYDROMORPHONE HYDROCHLORIDE 1 MG/ML
1 INJECTION, SOLUTION INTRAMUSCULAR; INTRAVENOUS; SUBCUTANEOUS EVERY 2 HOUR PRN
Status: DISCONTINUED | OUTPATIENT
Start: 2023-11-28 | End: 2023-11-29

## 2023-11-28 RX ORDER — PANTOPRAZOLE SODIUM 40 MG/1
40 TABLET, DELAYED RELEASE ORAL
Status: DISCONTINUED | OUTPATIENT
Start: 2023-11-28 | End: 2023-12-01 | Stop reason: HOSPADM

## 2023-11-28 RX ORDER — POLYETHYLENE GLYCOL 3350 17 G/17G
17 POWDER, FOR SOLUTION ORAL DAILY
Status: DISCONTINUED | OUTPATIENT
Start: 2023-11-28 | End: 2023-12-01 | Stop reason: HOSPADM

## 2023-11-28 RX ADMIN — KETOROLAC TROMETHAMINE 30 MG: 30 INJECTION, SOLUTION INTRAMUSCULAR; INTRAVENOUS at 16:09

## 2023-11-28 RX ADMIN — IOHEXOL 80 ML: 350 INJECTION, SOLUTION INTRAVENOUS at 10:23

## 2023-11-28 RX ADMIN — HYDROMORPHONE HYDROCHLORIDE 1 MG: 1 INJECTION, SOLUTION INTRAMUSCULAR; INTRAVENOUS; SUBCUTANEOUS at 16:53

## 2023-11-28 RX ADMIN — HYDROMORPHONE HYDROCHLORIDE 1 MG: 1 INJECTION, SOLUTION INTRAMUSCULAR; INTRAVENOUS; SUBCUTANEOUS at 20:02

## 2023-11-28 RX ADMIN — KETOROLAC TROMETHAMINE 30 MG: 30 INJECTION, SOLUTION INTRAMUSCULAR; INTRAVENOUS at 10:07

## 2023-11-28 RX ADMIN — KETOROLAC TROMETHAMINE 30 MG: 30 INJECTION, SOLUTION INTRAMUSCULAR; INTRAVENOUS at 21:59

## 2023-11-28 RX ADMIN — KETOROLAC TROMETHAMINE 30 MG: 30 INJECTION, SOLUTION INTRAMUSCULAR; INTRAVENOUS at 04:00

## 2023-11-28 RX ADMIN — SENNOSIDES AND DOCUSATE SODIUM 2 TABLET: 8.6; 5 TABLET ORAL at 20:03

## 2023-11-28 RX ADMIN — HYDROMORPHONE HYDROCHLORIDE 1 MG: 1 INJECTION, SOLUTION INTRAMUSCULAR; INTRAVENOUS; SUBCUTANEOUS at 14:32

## 2023-11-28 RX ADMIN — ENOXAPARIN SODIUM 40 MG: 100 INJECTION SUBCUTANEOUS at 01:17

## 2023-11-28 RX ADMIN — HYDROMORPHONE HYDROCHLORIDE 1 MG: 1 INJECTION, SOLUTION INTRAMUSCULAR; INTRAVENOUS; SUBCUTANEOUS at 08:18

## 2023-11-28 RX ADMIN — HYDROMORPHONE HYDROCHLORIDE 1 MG: 1 INJECTION, SOLUTION INTRAMUSCULAR; INTRAVENOUS; SUBCUTANEOUS at 12:05

## 2023-11-28 RX ADMIN — HYDROMORPHONE HYDROCHLORIDE 1 MG: 1 INJECTION, SOLUTION INTRAMUSCULAR; INTRAVENOUS; SUBCUTANEOUS at 06:02

## 2023-11-28 RX ADMIN — HYDROMORPHONE HYDROCHLORIDE 1 MG: 1 INJECTION, SOLUTION INTRAMUSCULAR; INTRAVENOUS; SUBCUTANEOUS at 03:07

## 2023-11-28 RX ADMIN — HYDROMORPHONE HYDROCHLORIDE 1 MG: 1 INJECTION, SOLUTION INTRAMUSCULAR; INTRAVENOUS; SUBCUTANEOUS at 23:36

## 2023-11-28 RX ADMIN — PANTOPRAZOLE SODIUM 40 MG: 40 TABLET, DELAYED RELEASE ORAL at 07:00

## 2023-11-28 RX ADMIN — SENNOSIDES AND DOCUSATE SODIUM 2 TABLET: 8.6; 5 TABLET ORAL at 15:17

## 2023-11-28 SDOH — SOCIAL STABILITY: SOCIAL INSECURITY: ARE YOU OR HAVE YOU BEEN THREATENED OR ABUSED PHYSICALLY, EMOTIONALLY, OR SEXUALLY BY ANYONE?: NO

## 2023-11-28 SDOH — SOCIAL STABILITY: SOCIAL INSECURITY: HAVE YOU HAD THOUGHTS OF HARMING ANYONE ELSE?: NO

## 2023-11-28 SDOH — SOCIAL STABILITY: SOCIAL INSECURITY: DOES ANYONE TRY TO KEEP YOU FROM HAVING/CONTACTING OTHER FRIENDS OR DOING THINGS OUTSIDE YOUR HOME?: NO

## 2023-11-28 SDOH — SOCIAL STABILITY: SOCIAL INSECURITY: DO YOU FEEL UNSAFE GOING BACK TO THE PLACE WHERE YOU ARE LIVING?: NO

## 2023-11-28 SDOH — SOCIAL STABILITY: SOCIAL INSECURITY: DO YOU FEEL ANYONE HAS EXPLOITED OR TAKEN ADVANTAGE OF YOU FINANCIALLY OR OF YOUR PERSONAL PROPERTY?: NO

## 2023-11-28 SDOH — SOCIAL STABILITY: SOCIAL INSECURITY: ARE THERE ANY APPARENT SIGNS OF INJURIES/BEHAVIORS THAT COULD BE RELATED TO ABUSE/NEGLECT?: NO

## 2023-11-28 SDOH — SOCIAL STABILITY: SOCIAL INSECURITY: WERE YOU ABLE TO COMPLETE ALL THE BEHAVIORAL HEALTH SCREENINGS?: YES

## 2023-11-28 SDOH — SOCIAL STABILITY: SOCIAL INSECURITY: ABUSE: ADULT

## 2023-11-28 ASSESSMENT — ACTIVITIES OF DAILY LIVING (ADL)
HEARING - LEFT EAR: FUNCTIONAL
BATHING: INDEPENDENT
PATIENT'S MEMORY ADEQUATE TO SAFELY COMPLETE DAILY ACTIVITIES?: YES
HEARING - RIGHT EAR: FUNCTIONAL
GROOMING: INDEPENDENT
FEEDING YOURSELF: INDEPENDENT
DRESSING YOURSELF: INDEPENDENT
ADEQUATE_TO_COMPLETE_ADL: YES
WALKS IN HOME: INDEPENDENT
JUDGMENT_ADEQUATE_SAFELY_COMPLETE_DAILY_ACTIVITIES: YES
TOILETING: INDEPENDENT
LACK_OF_TRANSPORTATION: NO

## 2023-11-28 ASSESSMENT — LIFESTYLE VARIABLES
AUDIT-C TOTAL SCORE: 0
SKIP TO QUESTIONS 9-10: 1
HOW OFTEN DO YOU HAVE 6 OR MORE DRINKS ON ONE OCCASION: NEVER
HOW OFTEN DO YOU HAVE A DRINK CONTAINING ALCOHOL: NEVER
AUDIT-C TOTAL SCORE: 0
HOW MANY STANDARD DRINKS CONTAINING ALCOHOL DO YOU HAVE ON A TYPICAL DAY: PATIENT DOES NOT DRINK

## 2023-11-28 ASSESSMENT — PATIENT HEALTH QUESTIONNAIRE - PHQ9
SUM OF ALL RESPONSES TO PHQ9 QUESTIONS 1 & 2: 0
1. LITTLE INTEREST OR PLEASURE IN DOING THINGS: NOT AT ALL
2. FEELING DOWN, DEPRESSED OR HOPELESS: NOT AT ALL

## 2023-11-28 ASSESSMENT — PAIN SCALES - GENERAL
PAINLEVEL_OUTOF10: 8
PAINLEVEL_OUTOF10: 9
PAINLEVEL_OUTOF10: 7
PAINLEVEL_OUTOF10: 0 - NO PAIN
PAINLEVEL_OUTOF10: 4
PAINLEVEL_OUTOF10: 9
PAINLEVEL_OUTOF10: 7
PAINLEVEL_OUTOF10: 9
PAINLEVEL_OUTOF10: 10 - WORST POSSIBLE PAIN
PAINLEVEL_OUTOF10: 9

## 2023-11-28 ASSESSMENT — COGNITIVE AND FUNCTIONAL STATUS - GENERAL
PATIENT BASELINE BEDBOUND: NO
MOBILITY SCORE: 24
DAILY ACTIVITIY SCORE: 24

## 2023-11-28 ASSESSMENT — PAIN DESCRIPTION - LOCATION: LOCATION: GENERALIZED

## 2023-11-28 ASSESSMENT — PAIN SCALES - PAIN ASSESSMENT IN ADVANCED DEMENTIA (PAINAD)
BREATHING: NORMAL
BODYLANGUAGE: RELAXED
TOTALSCORE: 0
FACIALEXPRESSION: SMILING OR INEXPRESSIVE
CONSOLABILITY: NO NEED TO CONSOLE

## 2023-11-28 ASSESSMENT — PAIN SCALES - WONG BAKER
WONGBAKER_NUMERICALRESPONSE: NO HURT

## 2023-11-28 ASSESSMENT — PAIN - FUNCTIONAL ASSESSMENT
PAIN_FUNCTIONAL_ASSESSMENT: 0-10

## 2023-11-28 ASSESSMENT — PAIN DESCRIPTION - ORIENTATION: ORIENTATION: OTHER (COMMENT)
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
## 2023-11-28 NOTE — SIGNIFICANT EVENT
Assessment & Plan:     Mr. Joellen Narayan is a 23 year old male with PMHx of Sickle Cell disease (HbSS c/b dactylitis, mild splenic sequestration in 2001, priapism, hx of acute chest syndrome 2/2023), LVH, and nocturnal hypoxia (intermittently in 2l NC at home) who presents to [facility] ED with complaints of right foot/RLE pain consistent with his typical sickle cell pain. Hgb and lysis labs stable. CXR pending. Admitted for further management. Started IV dilaudid for pain management. Of note, he was previously found to have retroperitoneal lymphadenopathy in 4/2022. He was supposed to follow up with Dr. Stoll 12/16/22 with plan for surg/onc consult for large tissue biopsy but patient missed apt and was lost to follow up. 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 Hematology consulted 11/28, recs pending. He is being admitted for further management of sickle cell pain and workup of retroperitoneal lymphadenopathy.      # Hgb SS with pain  - Hx acute chest syndrome (last 2/2023) and priapism   - Doesn't follow with sickle cell clinic and not on any disease modifying medications   - Hgb baseline ~8.5, Hgb 7.1 11/28, will continue to monitor. Consider transfusion if hgb <7  - Lysis labs near baseline  - OARRS reviewed no aberrancies  - No current care path   - On admit started on IV dilaudid 1mg q2hrs PRN severe pain (11/28-)  - On admit started IV Toradol 30mg q6hrs x3 days (11/28-11/30) with Protonix for PPI prophy  - CXR pending 11/28  - Hgb S (11/27): 94.1%  - Utox (11/28): appropriately positive opiates  - PO Zofran PRN opioid-induced nausea, PO Benadryl PRN opioid-induced pruritis  - Bowel regimen for opioid-induced constipation with DocuSenna 2tabs BID, Miralax daily      # Retroperitoneal lymphadenopathy  - Enlarged lymph nodes noted 4/1/22  - 11/14/22 RUQ u/s with mildly  enlarged LNs in the region of the kavin hepatis  - 11/16/22 MRI liver showed re- demonstration of bulky retroperitoneal lymphadenopathy and kavin hepatic lymphadenopathy    - 11/18 lymph node biopsy showed atypical lymphoid infiltrate. Reviewed by Hemepath board, insufficient for lymphoma diagnosis  - PET/CT 12/6/22 showing retroperitoneal lymphadenopathy  - Followed up with Dr. Stoll 12/16/22 with plan for surg/onc consult for large tissue biopsy but patient missed apt and was lost to follow up  - Requested new apt with Dr. 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  - Hematology consulted 11/28; recs pending   - Uric acid 5.4 11/28  - Baseline LDH ~400-500,  11/27      # History of Right foot cellulitis   - Initially admitted for cellulitis of right foot 2/15/23 after abrasion against dresser   - Readmitted 2 additional times for right foot cellulitis d/t failed healing after abx treatment   - s/p vancomycin and doxycycline 3/17-3/31   - Follows with wound care regularly outpatient   - On exam, no signs of infection, open wounds, purulent drainage or erythema       DVT prophy: Lovenox SQ, SCDs, encourage ambulation     DISPO:  - Full Code  - DC pending lymphadenopathy workup and pain management  - Sickle Cell FUV (Dr. Cruz) requested/pending     Assessment and plan discussed with attending physician Dr. Duque, APRN-CNP
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
## 2023-11-29 PROBLEM — D57.00 SICKLE CELL PAIN CRISIS (MULTI): Status: RESOLVED | Noted: 2023-11-23 | Resolved: 2023-11-29

## 2023-11-29 LAB
ALBUMIN SERPL BCP-MCNC: 4.1 G/DL (ref 3.4–5)
ALP SERPL-CCNC: 132 U/L (ref 33–120)
ALT SERPL W P-5'-P-CCNC: 96 U/L (ref 10–52)
ANION GAP SERPL CALC-SCNC: 13 MMOL/L (ref 10–20)
AST SERPL W P-5'-P-CCNC: 176 U/L (ref 9–39)
BASOPHILS # BLD AUTO: 0.05 X10*3/UL (ref 0–0.1)
BASOPHILS NFR BLD AUTO: 0.5 %
BILIRUB SERPL-MCNC: 8.9 MG/DL (ref 0–1.2)
BUN SERPL-MCNC: 8 MG/DL (ref 6–23)
CALCIUM SERPL-MCNC: 9.3 MG/DL (ref 8.6–10.6)
CHLORIDE SERPL-SCNC: 106 MMOL/L (ref 98–107)
CO2 SERPL-SCNC: 25 MMOL/L (ref 21–32)
CREAT SERPL-MCNC: 0.64 MG/DL (ref 0.5–1.3)
EOSINOPHIL # BLD AUTO: 0.33 X10*3/UL (ref 0–0.7)
EOSINOPHIL NFR BLD AUTO: 3.3 %
ERYTHROCYTE [DISTWIDTH] IN BLOOD BY AUTOMATED COUNT: 28 % (ref 11.5–14.5)
GFR SERPL CREATININE-BSD FRML MDRD: >90 ML/MIN/1.73M*2
GLUCOSE SERPL-MCNC: 64 MG/DL (ref 74–99)
HCT VFR BLD AUTO: 21.6 % (ref 41–52)
HEMOGLOBIN A2: 4.4 % (ref 2–3.5)
HEMOGLOBIN F: 1.5 % (ref 0–2)
HEMOGLOBIN IDENTIFICATION INTERPRETATION: ABNORMAL
HEMOGLOBIN S: 94.1 %
HGB BLD-MCNC: 7.7 G/DL (ref 13.5–17.5)
HGB RETIC QN: 29 PG (ref 28–38)
IMM GRANULOCYTES # BLD AUTO: 0.05 X10*3/UL (ref 0–0.7)
IMM GRANULOCYTES NFR BLD AUTO: 0.5 % (ref 0–0.9)
IMMATURE RETIC FRACTION: 43.9 %
LDH SERPL L TO P-CCNC: 503 U/L (ref 84–246)
LYMPHOCYTES # BLD AUTO: 3.24 X10*3/UL (ref 1.2–4.8)
LYMPHOCYTES NFR BLD AUTO: 32.7 %
MCH RBC QN AUTO: 25.8 PG (ref 26–34)
MCHC RBC AUTO-ENTMCNC: 35.6 G/DL (ref 32–36)
MCV RBC AUTO: 72 FL (ref 80–100)
MONOCYTES # BLD AUTO: 1.05 X10*3/UL (ref 0.1–1)
MONOCYTES NFR BLD AUTO: 10.6 %
NEUTROPHILS # BLD AUTO: 5.19 X10*3/UL (ref 1.2–7.7)
NEUTROPHILS NFR BLD AUTO: 52.4 %
NRBC BLD-RTO: 3 /100 WBCS (ref 0–0)
PATH REVIEW-HGB IDENTIFICATION: ABNORMAL
PLATELET # BLD AUTO: 360 X10*3/UL (ref 150–450)
POLYCHROMASIA BLD QL SMEAR: NORMAL
POTASSIUM SERPL-SCNC: 5.1 MMOL/L (ref 3.5–5.3)
PROT SERPL-MCNC: 6.5 G/DL (ref 6.4–8.2)
RBC # BLD AUTO: 2.99 X10*6/UL (ref 4.5–5.9)
RBC MORPH BLD: NORMAL
RETICS #: 0.41 X10*6/UL (ref 0.02–0.12)
RETICS/RBC NFR AUTO: 13.6 % (ref 0.5–2)
SCHISTOCYTES BLD QL SMEAR: NORMAL
SICKLE CELLS BLD QL SMEAR: NORMAL
SODIUM SERPL-SCNC: 139 MMOL/L (ref 136–145)
TARGETS BLD QL SMEAR: NORMAL
WBC # BLD AUTO: 9.9 X10*3/UL (ref 4.4–11.3)

## 2023-11-29 PROCEDURE — 36415 COLL VENOUS BLD VENIPUNCTURE: CPT

## 2023-11-29 PROCEDURE — 85025 COMPLETE CBC W/AUTO DIFF WBC: CPT

## 2023-11-29 PROCEDURE — 85045 AUTOMATED RETICULOCYTE COUNT: CPT

## 2023-11-29 PROCEDURE — 84075 ASSAY ALKALINE PHOSPHATASE: CPT

## 2023-11-29 PROCEDURE — 2500000004 HC RX 250 GENERAL PHARMACY W/ HCPCS (ALT 636 FOR OP/ED)

## 2023-11-29 PROCEDURE — 99233 SBSQ HOSP IP/OBS HIGH 50: CPT | Performed by: INTERNAL MEDICINE

## 2023-11-29 PROCEDURE — 2500000004 HC RX 250 GENERAL PHARMACY W/ HCPCS (ALT 636 FOR OP/ED): Performed by: NURSE PRACTITIONER

## 2023-11-29 PROCEDURE — 1170000001 HC PRIVATE ONCOLOGY ROOM DAILY

## 2023-11-29 PROCEDURE — 83615 LACTATE (LD) (LDH) ENZYME: CPT

## 2023-11-29 PROCEDURE — 96372 THER/PROPH/DIAG INJ SC/IM: CPT

## 2023-11-29 RX ORDER — HYDROMORPHONE HYDROCHLORIDE 1 MG/ML
1 INJECTION, SOLUTION INTRAMUSCULAR; INTRAVENOUS; SUBCUTANEOUS
Status: DISCONTINUED | OUTPATIENT
Start: 2023-11-29 | End: 2023-11-30

## 2023-11-29 RX ADMIN — HYDROMORPHONE HYDROCHLORIDE 1 MG: 1 INJECTION, SOLUTION INTRAMUSCULAR; INTRAVENOUS; SUBCUTANEOUS at 20:38

## 2023-11-29 RX ADMIN — KETOROLAC TROMETHAMINE 30 MG: 30 INJECTION, SOLUTION INTRAMUSCULAR; INTRAVENOUS at 22:09

## 2023-11-29 RX ADMIN — KETOROLAC TROMETHAMINE 30 MG: 30 INJECTION, SOLUTION INTRAMUSCULAR; INTRAVENOUS at 10:36

## 2023-11-29 RX ADMIN — ENOXAPARIN SODIUM 40 MG: 100 INJECTION SUBCUTANEOUS at 01:12

## 2023-11-29 RX ADMIN — HYDROMORPHONE HYDROCHLORIDE 1 MG: 1 INJECTION, SOLUTION INTRAMUSCULAR; INTRAVENOUS; SUBCUTANEOUS at 17:18

## 2023-11-29 RX ADMIN — HYDROMORPHONE HYDROCHLORIDE 1 MG: 1 INJECTION, SOLUTION INTRAMUSCULAR; INTRAVENOUS; SUBCUTANEOUS at 06:41

## 2023-11-29 RX ADMIN — KETOROLAC TROMETHAMINE 30 MG: 30 INJECTION, SOLUTION INTRAMUSCULAR; INTRAVENOUS at 16:12

## 2023-11-29 RX ADMIN — KETOROLAC TROMETHAMINE 30 MG: 30 INJECTION, SOLUTION INTRAMUSCULAR; INTRAVENOUS at 04:37

## 2023-11-29 RX ADMIN — HYDROMORPHONE HYDROCHLORIDE 1 MG: 1 INJECTION, SOLUTION INTRAMUSCULAR; INTRAVENOUS; SUBCUTANEOUS at 02:26

## 2023-11-29 ASSESSMENT — PAIN SCALES - GENERAL
PAINLEVEL_OUTOF10: 9
PAINLEVEL_OUTOF10: 7
PAINLEVEL_OUTOF10: 7
PAINLEVEL_OUTOF10: 9
PAINLEVEL_OUTOF10: 9
PAINLEVEL_OUTOF10: 8
PAINLEVEL_OUTOF10: 5 - MODERATE PAIN
PAINLEVEL_OUTOF10: 9
PAINLEVEL_OUTOF10: 6
PAINLEVEL_OUTOF10: 8
PAINLEVEL_OUTOF10: 8
PAINLEVEL_OUTOF10: 7
PAINLEVEL_OUTOF10: 9

## 2023-11-29 ASSESSMENT — PAIN DESCRIPTION - LOCATION
LOCATION: LEG

## 2023-11-29 ASSESSMENT — PAIN SCALES - WONG BAKER
WONGBAKER_NUMERICALRESPONSE: NO HURT

## 2023-11-29 ASSESSMENT — PAIN - FUNCTIONAL ASSESSMENT
PAIN_FUNCTIONAL_ASSESSMENT: 0-10

## 2023-11-29 ASSESSMENT — PAIN DESCRIPTION - ORIENTATION
ORIENTATION: RIGHT

## 2023-11-29 NOTE — NURSING NOTE
Assumed care of patient. Patient sitting bedside on roomair with complaints of pain. Pt given prn pain medication.   BSSR given on patient. Pt able to ambulate without assistance.     Pt pain remain consistant throughout the night with little to no relieve of prn medications. Call light and personals within reach. Will continue to monitor
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
## 2023-11-29 NOTE — CARE PLAN
Problem: Pain  Goal: Takes deep breaths with improved pain control throughout the shift  Outcome: Progressing  Goal: Turns in bed with improved pain control throughout the shift  Outcome: Progressing  Goal: Walks with improved pain control throughout the shift  Outcome: Progressing  Goal: Performs ADL's with improved pain control throughout shift  Outcome: Progressing  Goal: Participates in PT with improved pain control throughout the shift  Outcome: Progressing  Goal: Free from opioid side effects throughout the shift  Outcome: Progressing  Goal: Free from acute confusion related to pain meds throughout the shift  Outcome: Progressing     Problem: Safety  Goal: Patient will be injury free during hospitalization  Outcome: Progressing  Goal: I will remain free of falls  Outcome: Progressing     Problem: Daily Care  Goal: Daily care needs are met  Outcome: Progressing     Problem: Psychosocial Needs  Goal: Demonstrates ability to cope with hospitalization/illness  Outcome: Progressing  Goal: Collaborate with me, my family, and caregiver to identify my specific goals  Outcome: Progressing     Problem: Discharge Barriers  Goal: My discharge needs are met  Outcome: Progressing       The patient's goals for the shift include  Pain Management    The clinical goals for the shift include patient will rate pain at comfortable level by end of shift    Over the shift, the patient did not make progress toward the following goals.

## 2023-11-29 NOTE — CARE PLAN
The patient and clinical goals for the shift include patient will rate pain <6/10 this shift    Patient with stable VS. Took scheduled toradol for R leg pain. Took only 1 dose of dilaudid in last half hour. No nausea or SOB. Walking room often. Plan is for NPO at midnight for possible IR biopsy tomorrow. Patient remained safe this shift. Claire Nowak RN

## 2023-11-29 NOTE — PROGRESS NOTES
Physical Therapy                 Therapy Communication Note    Patient Name: Joellen Narayan  MRN: [id number]  Today's Date: 11/29/2023     Discipline: Physical Therapy    Missed Visit Reason: Missed Visit Reason: Other (Comment) (screen-pt sitting EOB upon entry to room.Pt stating he has some improvement in leg pain since being admitted and is moving well.Pt demonstrating steady gait while ambulating to bathroom this date. Pt denies any acute PT needs at this time. Will DC order.)      11/29/23 at 10:27 AM - Nava Medeiros PT

## 2023-11-29 NOTE — CONSULTS
Reason For Consult  Increasing size of retroperitoneal lymphadenopathy    History Of Present Illness  Joellen Narayan is a 23 y.o. male with pmh sickle cell, LVH, and retroperitoneal lymphadenopathy is admitted for right foot/RLE pain consistent with his typical sickle cell pain. CT a/p obtained on 11/28 revealed increased size of retroperitoneal lymph nodes present on imaging dating back to (04/01/2022) as described above. These findings could reflect extramedullary hematopoiesis in the setting of sickle cell versus indolent lymphoma. The patient had a previous biopsy that showed atypical lymphoid infiltrate and was referred to Dr. Marte but did not follow up. Surg onc is consulted for evaluation on RP lymphadenopathy.     Patient examined. Denies all abdominal, flank, and back pain. Denies issues with Bms and urinating. Denies early satiety, n/v, fever/chills at night.      Past Medical History  He has a past medical history of Corrosion of unspecified body region, unspecified degree (12/31/2014), Personal history of diseases of the blood and blood-forming organs and certain disorders involving the immune mechanism, Personal history of other (healed) physical injury and trauma (01/03/2015), Personal history of other diseases of the circulatory system, Personal history of other diseases of the circulatory system, and Rash and other nonspecific skin eruption (09/09/2014).    Surgical History  He has a past surgical history that includes IR CVC tunneled (6/21/2022) and CT guided percutaneous biopsy LYMPH node superficial (11/18/2022).     Social History  He reports that he has never smoked. He has been exposed to tobacco smoke. He has never used smokeless tobacco. He reports that he does not drink alcohol and does not use drugs.    Family History  No family history on file.     Allergies  Ceftriaxone    Review of Systems    All other ROS negative expect those mentioned in HPI.      Physical Exam  General: Laying in  "bed. NAD.   Eyes: EOMI  ENMT: no apparent injury, no lesions seen, MMM  Head/neck: NCAT  Cardiac: regular rate in chart  Pulm: normal respiratory effort  GI: soft, NT/ND, no masses palpated  Msk: RAMIREZ  Extremities: normal extremities, pain in right foot  Skin: warm, dry, no lesions noted  Neuro: AOx3  Psych: appropriate mood and behavior      Last Recorded Vitals  Blood pressure 109/59, pulse 72, temperature 37.2 °C (99 °F), temperature source Temporal, resp. rate 18, height 1.88 m (6' 2\"), weight 68 kg (150 lb), SpO2 93 %.    Relevant Results     Results for orders placed or performed during the hospital encounter of 11/27/23 (from the past 24 hour(s))   Comprehensive metabolic panel   Result Value Ref Range    Glucose 86 74 - 99 mg/dL    Sodium 139 136 - 145 mmol/L    Potassium 4.2 3.5 - 5.3 mmol/L    Chloride 108 (H) 98 - 107 mmol/L    Bicarbonate 24 21 - 32 mmol/L    Anion Gap 11 10 - 20 mmol/L    Urea Nitrogen 9 6 - 23 mg/dL    Creatinine 0.77 0.50 - 1.30 mg/dL    eGFR >90 >60 mL/min/1.73m*2    Calcium 9.1 8.6 - 10.6 mg/dL    Albumin 4.2 3.4 - 5.0 g/dL    Alkaline Phosphatase 100 33 - 120 U/L    Total Protein 6.4 6.4 - 8.2 g/dL    AST 47 (H) 9 - 39 U/L    Bilirubin, Total 5.3 (H) 0.0 - 1.2 mg/dL    ALT 41 10 - 52 U/L   Reticulocytes   Result Value Ref Range    Retic % 13.0 (H) 0.5 - 2.0 %    Retic Absolute 0.344 (H) 0.022 - 0.118 x10*6/uL    Reticulocyte Hemoglobin 26 (L) 28 - 38 pg    Immature Retic fraction 46.5 (H) <=16.0 %   CBC and Auto Differential   Result Value Ref Range    WBC 9.9 4.4 - 11.3 x10*3/uL    nRBC 3.0 (H) 0.0 - 0.0 /100 WBCs    RBC 2.99 (L) 4.50 - 5.90 x10*6/uL    Hemoglobin 7.7 (L) 13.5 - 17.5 g/dL    Hematocrit 21.6 (L) 41.0 - 52.0 %    MCV 72 (L) 80 - 100 fL    MCH 25.8 (L) 26.0 - 34.0 pg    MCHC 35.6 32.0 - 36.0 g/dL    RDW 28.0 (H) 11.5 - 14.5 %    Platelets 360 150 - 450 x10*3/uL    Neutrophils % 52.4 40.0 - 80.0 %    Immature Granulocytes %, Automated 0.5 0.0 - 0.9 %    Lymphocytes % " 32.7 13.0 - 44.0 %    Monocytes % 10.6 2.0 - 10.0 %    Eosinophils % 3.3 0.0 - 6.0 %    Basophils % 0.5 0.0 - 2.0 %    Neutrophils Absolute 5.19 1.20 - 7.70 x10*3/uL    Immature Granulocytes Absolute, Automated 0.05 0.00 - 0.70 x10*3/uL    Lymphocytes Absolute 3.24 1.20 - 4.80 x10*3/uL    Monocytes Absolute 1.05 (H) 0.10 - 1.00 x10*3/uL    Eosinophils Absolute 0.33 0.00 - 0.70 x10*3/uL    Basophils Absolute 0.05 0.00 - 0.10 x10*3/uL   Comprehensive metabolic panel   Result Value Ref Range    Glucose 64 (L) 74 - 99 mg/dL    Sodium 139 136 - 145 mmol/L    Potassium 5.1 3.5 - 5.3 mmol/L    Chloride 106 98 - 107 mmol/L    Bicarbonate 25 21 - 32 mmol/L    Anion Gap 13 10 - 20 mmol/L    Urea Nitrogen 8 6 - 23 mg/dL    Creatinine 0.64 0.50 - 1.30 mg/dL    eGFR >90 >60 mL/min/1.73m*2    Calcium 9.3 8.6 - 10.6 mg/dL    Albumin 4.1 3.4 - 5.0 g/dL    Alkaline Phosphatase 132 (H) 33 - 120 U/L    Total Protein 6.5 6.4 - 8.2 g/dL     (H) 9 - 39 U/L    Bilirubin, Total 8.9 (H) 0.0 - 1.2 mg/dL    ALT 96 (H) 10 - 52 U/L   Lactate dehydrogenase   Result Value Ref Range     (H) 84 - 246 U/L   Reticulocytes   Result Value Ref Range    Retic % 13.6 (H) 0.5 - 2.0 %    Retic Absolute 0.405 (H) 0.022 - 0.118 x10*6/uL    Reticulocyte Hemoglobin 29 28 - 38 pg    Immature Retic fraction 43.9 (H) <=16.0 %   Morphology   Result Value Ref Range    RBC Morphology See Below     Polychromasia Mild     RBC Fragments Few     Sickle Cells Many     Target Cells Many       Assessment/Plan     23 y.o. male with pmh sickle cell, LVH, and retroperitoneal lymphadenopathy is admitted for RLE sickle cell pain with CT showing increasing RP lymphadenopathy that was previously biopsied in 4/22 and showed atypical lymphoid infiltrate.     Recommendations:    - Please consult IR for assessment of biopsy  - Please page with any further questions or concerns    Discussed with attending, Dr. Chris Olivas MD  Surgical Oncology PGY1

## 2023-11-30 ENCOUNTER — APPOINTMENT (OUTPATIENT)
Dept: RADIOLOGY | Facility: HOSPITAL | Age: 23
DRG: 803 | End: 2023-11-30
Payer: COMMERCIAL

## 2023-11-30 LAB
ALBUMIN SERPL BCP-MCNC: 3.9 G/DL (ref 3.4–5)
ALP SERPL-CCNC: 119 U/L (ref 33–120)
ALT SERPL W P-5'-P-CCNC: 123 U/L (ref 10–52)
ANION GAP SERPL CALC-SCNC: 13 MMOL/L (ref 10–20)
AST SERPL W P-5'-P-CCNC: 121 U/L (ref 9–39)
BASO STIPL BLD QL SMEAR: PRESENT
BASOPHILS # BLD AUTO: 0.03 X10*3/UL (ref 0–0.1)
BASOPHILS NFR BLD AUTO: 0.4 %
BILIRUB SERPL-MCNC: 7.5 MG/DL (ref 0–1.2)
BUN SERPL-MCNC: 7 MG/DL (ref 6–23)
CALCIUM SERPL-MCNC: 8.9 MG/DL (ref 8.6–10.6)
CHLORIDE SERPL-SCNC: 108 MMOL/L (ref 98–107)
CO2 SERPL-SCNC: 25 MMOL/L (ref 21–32)
CREAT SERPL-MCNC: 0.62 MG/DL (ref 0.5–1.3)
DACRYOCYTES BLD QL SMEAR: NORMAL
EOSINOPHIL # BLD AUTO: 0.35 X10*3/UL (ref 0–0.7)
EOSINOPHIL NFR BLD AUTO: 4.5 %
ERYTHROCYTE [DISTWIDTH] IN BLOOD BY AUTOMATED COUNT: 28 % (ref 11.5–14.5)
GFR SERPL CREATININE-BSD FRML MDRD: >90 ML/MIN/1.73M*2
GLUCOSE SERPL-MCNC: 67 MG/DL (ref 74–99)
HCT VFR BLD AUTO: 18.8 % (ref 41–52)
HGB BLD-MCNC: 6.6 G/DL (ref 13.5–17.5)
HGB RETIC QN: 28 PG (ref 28–38)
HOWELL-JOLLY BOD BLD QL SMEAR: PRESENT
HYPOCHROMIA BLD QL SMEAR: NORMAL
IMM GRANULOCYTES # BLD AUTO: 0.05 X10*3/UL (ref 0–0.7)
IMM GRANULOCYTES NFR BLD AUTO: 0.6 % (ref 0–0.9)
IMMATURE RETIC FRACTION: 42.4 %
LDH SERPL L TO P-CCNC: 374 U/L (ref 84–246)
LYMPHOCYTES # BLD AUTO: 2.96 X10*3/UL (ref 1.2–4.8)
LYMPHOCYTES NFR BLD AUTO: 38.4 %
MCH RBC QN AUTO: 25.5 PG (ref 26–34)
MCHC RBC AUTO-ENTMCNC: 35.1 G/DL (ref 32–36)
MCV RBC AUTO: 73 FL (ref 80–100)
MONOCYTES # BLD AUTO: 1.08 X10*3/UL (ref 0.1–1)
MONOCYTES NFR BLD AUTO: 14 %
NEUTROPHILS # BLD AUTO: 3.23 X10*3/UL (ref 1.2–7.7)
NEUTROPHILS NFR BLD AUTO: 42.1 %
NRBC BLD-RTO: 4 /100 WBCS (ref 0–0)
OVALOCYTES BLD QL SMEAR: NORMAL
PAPPENHEIMER BOD BLD QL SMEAR: PRESENT
PLATELET # BLD AUTO: 324 X10*3/UL (ref 150–450)
POLYCHROMASIA BLD QL SMEAR: NORMAL
POTASSIUM SERPL-SCNC: 5.2 MMOL/L (ref 3.5–5.3)
PROT SERPL-MCNC: 6.2 G/DL (ref 6.4–8.2)
RBC # BLD AUTO: 2.59 X10*6/UL (ref 4.5–5.9)
RBC MORPH BLD: NORMAL
RETICS #: 0.37 X10*6/UL (ref 0.02–0.12)
RETICS/RBC NFR AUTO: 14.3 % (ref 0.5–2)
SCHISTOCYTES BLD QL SMEAR: NORMAL
SICKLE CELLS BLD QL SMEAR: NORMAL
SODIUM SERPL-SCNC: 141 MMOL/L (ref 136–145)
TARGETS BLD QL SMEAR: NORMAL
WBC # BLD AUTO: 7.7 X10*3/UL (ref 4.4–11.3)

## 2023-11-30 PROCEDURE — 83615 LACTATE (LD) (LDH) ENZYME: CPT

## 2023-11-30 PROCEDURE — 77012 CT SCAN FOR NEEDLE BIOPSY: CPT | Performed by: RADIOLOGY

## 2023-11-30 PROCEDURE — 88365 INSITU HYBRIDIZATION (FISH): CPT | Performed by: PATHOLOGY

## 2023-11-30 PROCEDURE — 99233 SBSQ HOSP IP/OBS HIGH 50: CPT

## 2023-11-30 PROCEDURE — 76705 ECHO EXAM OF ABDOMEN: CPT | Performed by: RADIOLOGY

## 2023-11-30 PROCEDURE — 2500000004 HC RX 250 GENERAL PHARMACY W/ HCPCS (ALT 636 FOR OP/ED): Performed by: RADIOLOGY

## 2023-11-30 PROCEDURE — 85045 AUTOMATED RETICULOCYTE COUNT: CPT

## 2023-11-30 PROCEDURE — 88307 TISSUE EXAM BY PATHOLOGIST: CPT | Performed by: PATHOLOGY

## 2023-11-30 PROCEDURE — 76705 ECHO EXAM OF ABDOMEN: CPT

## 2023-11-30 PROCEDURE — 88341 IMHCHEM/IMCYTCHM EA ADD ANTB: CPT | Mod: TC,SUR | Performed by: STUDENT IN AN ORGANIZED HEALTH CARE EDUCATION/TRAINING PROGRAM

## 2023-11-30 PROCEDURE — 49180 BIOPSY ABDOMINAL MASS: CPT | Performed by: RADIOLOGY

## 2023-11-30 PROCEDURE — 88189 FLOWCYTOMETRY/READ 16 & >: CPT | Performed by: PATHOLOGY

## 2023-11-30 PROCEDURE — 77012 CT SCAN FOR NEEDLE BIOPSY: CPT

## 2023-11-30 PROCEDURE — 99152 MOD SED SAME PHYS/QHP 5/>YRS: CPT | Performed by: RADIOLOGY

## 2023-11-30 PROCEDURE — 2720000007 HC OR 272 NO HCPCS

## 2023-11-30 PROCEDURE — 07BD3ZX EXCISION OF AORTIC LYMPHATIC, PERCUTANEOUS APPROACH, DIAGNOSTIC: ICD-10-PCS | Performed by: RADIOLOGY

## 2023-11-30 PROCEDURE — 2500000001 HC RX 250 WO HCPCS SELF ADMINISTERED DRUGS (ALT 637 FOR MEDICARE OP)

## 2023-11-30 PROCEDURE — 88185 FLOWCYTOMETRY/TC ADD-ON: CPT | Mod: TC | Performed by: STUDENT IN AN ORGANIZED HEALTH CARE EDUCATION/TRAINING PROGRAM

## 2023-11-30 PROCEDURE — 2500000004 HC RX 250 GENERAL PHARMACY W/ HCPCS (ALT 636 FOR OP/ED): Performed by: NURSE PRACTITIONER

## 2023-11-30 PROCEDURE — 88341 IMHCHEM/IMCYTCHM EA ADD ANTB: CPT | Performed by: PATHOLOGY

## 2023-11-30 PROCEDURE — 80053 COMPREHEN METABOLIC PANEL: CPT

## 2023-11-30 PROCEDURE — 2500000004 HC RX 250 GENERAL PHARMACY W/ HCPCS (ALT 636 FOR OP/ED)

## 2023-11-30 PROCEDURE — 85025 COMPLETE CBC W/AUTO DIFF WBC: CPT

## 2023-11-30 PROCEDURE — 36415 COLL VENOUS BLD VENIPUNCTURE: CPT

## 2023-11-30 PROCEDURE — 88342 IMHCHEM/IMCYTCHM 1ST ANTB: CPT | Performed by: PATHOLOGY

## 2023-11-30 PROCEDURE — 1170000001 HC PRIVATE ONCOLOGY ROOM DAILY

## 2023-11-30 RX ORDER — FENTANYL CITRATE 50 UG/ML
INJECTION, SOLUTION INTRAMUSCULAR; INTRAVENOUS
Status: COMPLETED | OUTPATIENT
Start: 2023-11-30 | End: 2023-11-30

## 2023-11-30 RX ORDER — HYDROMORPHONE HYDROCHLORIDE 1 MG/ML
1 INJECTION, SOLUTION INTRAMUSCULAR; INTRAVENOUS; SUBCUTANEOUS EVERY 4 HOURS PRN
Status: DISCONTINUED | OUTPATIENT
Start: 2023-11-30 | End: 2023-12-01 | Stop reason: HOSPADM

## 2023-11-30 RX ORDER — OXYCODONE HYDROCHLORIDE 5 MG/1
15 TABLET ORAL EVERY 4 HOURS PRN
Status: DISCONTINUED | OUTPATIENT
Start: 2023-11-30 | End: 2023-12-01 | Stop reason: HOSPADM

## 2023-11-30 RX ORDER — MIDAZOLAM HYDROCHLORIDE 1 MG/ML
INJECTION INTRAMUSCULAR; INTRAVENOUS
Status: COMPLETED | OUTPATIENT
Start: 2023-11-30 | End: 2023-11-30

## 2023-11-30 RX ADMIN — OXYCODONE HYDROCHLORIDE 15 MG: 5 TABLET ORAL at 15:46

## 2023-11-30 RX ADMIN — HYDROMORPHONE HYDROCHLORIDE 1 MG: 1 INJECTION, SOLUTION INTRAMUSCULAR; INTRAVENOUS; SUBCUTANEOUS at 18:14

## 2023-11-30 RX ADMIN — HYDROMORPHONE HYDROCHLORIDE 1 MG: 1 INJECTION, SOLUTION INTRAMUSCULAR; INTRAVENOUS; SUBCUTANEOUS at 22:14

## 2023-11-30 RX ADMIN — FENTANYL CITRATE 50 MCG: 50 INJECTION, SOLUTION INTRAMUSCULAR; INTRAVENOUS at 10:13

## 2023-11-30 RX ADMIN — MIDAZOLAM HYDROCHLORIDE 2 MG: 1 INJECTION, SOLUTION INTRAMUSCULAR; INTRAVENOUS at 10:13

## 2023-11-30 RX ADMIN — OXYCODONE HYDROCHLORIDE 15 MG: 5 TABLET ORAL at 20:36

## 2023-11-30 RX ADMIN — HYDROMORPHONE HYDROCHLORIDE 1 MG: 1 INJECTION, SOLUTION INTRAMUSCULAR; INTRAVENOUS; SUBCUTANEOUS at 05:07

## 2023-11-30 ASSESSMENT — PAIN DESCRIPTION - ORIENTATION
ORIENTATION: LEFT;LOWER
ORIENTATION: LEFT;LOWER

## 2023-11-30 ASSESSMENT — PAIN SCALES - GENERAL
PAINLEVEL_OUTOF10: 10 - WORST POSSIBLE PAIN
PAINLEVEL_OUTOF10: 10 - WORST POSSIBLE PAIN
PAINLEVEL_OUTOF10: 9
PAINLEVEL_OUTOF10: 0 - NO PAIN
PAINLEVEL_OUTOF10: 0 - NO PAIN
PAINLEVEL_OUTOF10: 10 - WORST POSSIBLE PAIN
PAINLEVEL_OUTOF10: 0 - NO PAIN
PAINLEVEL_OUTOF10: 10 - WORST POSSIBLE PAIN
PAINLEVEL_OUTOF10: 0 - NO PAIN
PAINLEVEL_OUTOF10: 0 - NO PAIN
PAINLEVEL_OUTOF10: 6
PAINLEVEL_OUTOF10: 10 - WORST POSSIBLE PAIN
PAINLEVEL_OUTOF10: 10 - WORST POSSIBLE PAIN

## 2023-11-30 ASSESSMENT — PAIN SCALES - WONG BAKER
WONGBAKER_NUMERICALRESPONSE: NO HURT
WONGBAKER_NUMERICALRESPONSE: NO HURT

## 2023-11-30 ASSESSMENT — PAIN DESCRIPTION - LOCATION
LOCATION: BACK
LOCATION: BACK

## 2023-11-30 NOTE — PROGRESS NOTES
"Joellen Narayan is a 23 y.o. male on day 2 of admission presenting with Sickle cell pain crisis (CMS/HCC).    Subjective   Seen this AM at the bedside. Pt states his pain has improved today. We discussed rotating his pain with PO oxycodone, pt agreed to this plan. We also discussed that IR is planning to do the bx today, pt agrees to this plan. Denies any fevers/chills, SOB, or CP, N/V/D.     Objective     Physical Exam  Vitals reviewed.   Constitutional:       Appearance: Normal appearance.   HENT:      Head: Normocephalic and atraumatic.      Nose: Nose normal.      Mouth/Throat:      Mouth: Mucous membranes are moist.      Pharynx: Oropharynx is clear.   Eyes:      Extraocular Movements: Extraocular movements intact.      Pupils: Pupils are equal, round, and reactive to light.   Cardiovascular:      Rate and Rhythm: Normal rate and regular rhythm.      Pulses: Normal pulses.      Heart sounds: Normal heart sounds.   Pulmonary:      Effort: Pulmonary effort is normal.      Breath sounds: Normal breath sounds.   Abdominal:      General: Bowel sounds are normal.      Palpations: Abdomen is soft.   Musculoskeletal:         General: Normal range of motion.   Skin:     General: Skin is warm.   Neurological:      General: No focal deficit present.      Mental Status: He is alert and oriented to person, place, and time. Mental status is at baseline.   Psychiatric:         Mood and Affect: Mood normal.         Behavior: Behavior normal.       Last Recorded Vitals  Blood pressure 144/64, pulse 71, temperature 36.8 °C (98.2 °F), temperature source Temporal, resp. rate 18, height 1.88 m (6' 2\"), weight 68 kg (150 lb), SpO2 94 %.  Intake/Output last 3 Shifts:  I/O last 3 completed shifts:  In: 2380 (35 mL/kg) [P.O.:2380]  Out: 0 (0 mL/kg)   Weight: 68 kg     Assessment/Plan   Active Problems:  There are no active Hospital Problems.    Mr. Joellen Narayan is a 23 year old male with PMHx of Sickle Cell disease (HbSS c/b " dactylitis, mild splenic sequestration in 2001, priapism, hx of acute chest syndrome 2/2023), LVH, and nocturnal hypoxia (intermittently in 2l NC at home) who presents to [facility] ED with complaints of right foot/RLE pain consistent with his typical sickle cell pain. Hgb and lysis labs stable. CXR pending. Admitted for further management. Started IV dilaudid for pain management. Of note, he was previously found to have retroperitoneal lymphadenopathy in 4/2022. 11/18/22 pt had lymph node bx that showed atypical lymphoid infiltrate, insufficient  for lymphoma diagnosis. PET/CT 12/6/22 showing retroperitoneal lymphadenopathy. Pt followed up with Dr. Stoll 12/16/22 with plans for larger bx with Dr. Marte, however pt was lost to follow up. 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 Hematology consulted 11/28, rec consulting surg obc for excisional LN bx. Surg onc consulted 11/29, rec IR consult for biopsy. 11/29 IR consult placed, plan biopsy today 11/30. Discharge pending pain control and IR biopsy.      # Hgb SS with pain  - Hx acute chest syndrome (last 2/2023) and priapism   - Doesn't follow with sickle cell clinic and not on any disease modifying medications   - Hgb baseline ~8.5, Hgb 7.1 11/28, will continue to monitor. Consider transfusion if hgb <7  - 11/30 Hgb 6.6, pt aware and and educated on the benefits of receiving blood, however pt refused blood transfusion  - Lysis labs near baseline, chronic hyperbilirubinemia noted (BL ~7-8)  - OARRS reviewed no aberrancies  - No current care path   - s/p IV dilaudid 1mg q2hrs PRN severe pain (11/28-11/29)  - (11/29-11/30) s/p IV dilaudid 1mg q3hrs PRN severe pain  - (11/30-current) start rotating PO oxycodone 15mg q4h PRN with IV dilaudid q4h PRN for severe pain   - On admit started IV Toradol 30mg q6hrs x3 days (11/28-11/30)  with Protonix for PPI prophy  - CXR (11/28): negative for acute process  - Hgb S (11/27): 94.1%  - Utox (11/28): appropriately positive opiates  - PO Zofran PRN opioid-induced nausea, PO Benadryl PRN opioid-induced pruritis  - Bowel regimen for opioid-induced constipation with DocuSenna 2tabs BID, Miralax daily      # Retroperitoneal lymphadenopathy  - Enlarged lymph nodes noted 4/1/22  - 11/14/22 RUQ u/s with mildly enlarged LNs in the region of the kavin hepatis  - 11/16/22 MRI liver showed re- demonstration of bulky retroperitoneal lymphadenopathy and kavin hepatic lymphadenopathy    - 11/18 lymph node biopsy showed atypical lymphoid infiltrate. Reviewed by Hemepath board, insufficient for lymphoma diagnosis  - PET/CT 12/6/22 showing retroperitoneal lymphadenopathy  - Followed up with Dr. Stoll 12/16/22 with plan for surg/onc consult for large tissue biopsy but patient missed apt and was lost to follow up  - Requested new apt with Dr. 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  - Hematology consulted rec consulting surg obc for excisional LN bx  - Surg onc consulted 11/29, rec IR consult for assessment for biopsy  - 11/29 IR consulted for biopsy, plan for biopsy today 11/30  - Uric acid 5.4 11/28  - Baseline LDH ~400-500,  11/30    #Transaminitis  - 11/29 ALT 96, ,   - Pt denies RUQ pain  - 11/30 CMP pending  - 11/30 RUQ US pending     # History of Right foot cellulitis   - Initially admitted for cellulitis of right foot 2/15/23 after abrasion against dresser   - Readmitted 2 additional times for right foot cellulitis d/t failed healing after abx treatment   - s/p vancomycin and doxycycline 3/17-3/31   - Follows with wound care regularly outpatient   - On exam, no signs of infection, open wounds, purulent drainage  or erythema       DVT prophy: Lovenox SQ, SCDs, encourage ambulation     DISPO:  - Full Code  - DC pending lymphadenopathy workup and pain management  - Sickle Cell FUV (Dr. Cruz) requested/pending and Dr. Stoll requested/ pending     Assessment and plan discussed with attending physician Dr. Ochoa      I spent >60 minutes in the professional and overall care of this patient.    CORINA MistryC

## 2023-11-30 NOTE — NURSING NOTE
Assumed care of patient. Patient in restroom. Pt with guest at bedside. Report given on patient.     VSS throughout shift. Pt with decreased demand for PRN pain medications. Pt NPO at might night for scheduled biopsy.     Pts bed low for safety, call light and personals within reach. Will continue to monitor.

## 2023-11-30 NOTE — PROGRESS NOTES
"Joellen Narayan is a 23 y.o. male on day 2 of admission presenting with Sickle cell pain crisis (CMS/HCC).    Subjective   No acute overnight events. Patient denies n/v, fever/chills, cp/sob, and worsening abdominal pain.         Objective     Physical Exam  General: Laying in bed. NAD.   Eyes: EOMI  ENMT: no apparent injury, no lesions seen, MMM  Head/neck: NCAT  Cardiac: regular rate in chart  Pulm: normal respiratory effort  GI: soft, NT/ND, no masses palpated  Msk: RAMIREZ  Extremities: normal extremities, pain in right foot  Skin: warm, dry, no lesions noted  Neuro: AOx3  Psych: appropriate mood and behavior   Last Recorded Vitals  Blood pressure 144/64, pulse 71, temperature 36.8 °C (98.2 °F), temperature source Temporal, resp. rate 18, height 1.88 m (6' 2\"), weight 68 kg (150 lb), SpO2 94 %.  Intake/Output last 3 Shifts:  I/O last 3 completed shifts:  In: 2380 (35 mL/kg) [P.O.:2380]  Out: 0 (0 mL/kg)   Weight: 68 kg            Assessment/Plan   Active Problems:  There are no active Hospital Problems.    23 y.o. male with pmh sickle cell, LVH, and retroperitoneal lymphadenopathy is admitted for RLE sickle cell pain with CT showing increasing RP lymphadenopathy that was previously biopsied in 4/22 and showed atypical lymphoid infiltrate.      Recommendations:    - FU discussion of IR on feasibility of RPLN bx  - Please page with any further questions or concerns     Discussed with attending, Dr. Chris Olivas MD  Surgical Oncology PGY1       Janak Olivas MD      "

## 2023-11-30 NOTE — POST-PROCEDURE NOTE
Interventional Radiology Brief Postprocedure Note    Attending: Dr. Chrystal Ridley    Assistant: Dr. Ana Carballo    Diagnosis: Enlarged retroperitoneal lymph nodes.     Description of procedure: Status post successful IR CT guided biopsy of an enlarged left retroperitoneal lymph node. Two Flow cytometry and three surgical pathology specimens collected.     Anesthesia:  Local    Complications: None    Estimated Blood Loss: none    Medications  As of 11/30/23 1335      HYDROmorphone (Dilaudid) injection 1 mg (mg) Total dose:  16 mg Dosing weight:  68      Date/Time Rate/Dose/Volume Action       11/27/23  1947 1 mg Given      2015 1 mg Given      2158 1 mg Given     11/28/23  0307 1 mg Given      0602 1 mg Given      0818 1 mg Given      1205 1 mg Given      1432 1 mg Given      1653 1 mg Given      2002 1 mg Given      2336 1 mg Given     11/29/23  0226 1 mg Given      0641 1 mg Given      1718 1 mg Given      2038 1 mg Given     11/30/23  0507 1 mg Given               ketorolac (Toradol) injection 15 mg (mg) Total dose:  15 mg Dosing weight:  68      Date/Time Rate/Dose/Volume Action       11/27/23  2239 15 mg Given               enoxaparin (Lovenox) syringe 40 mg (mg) Total dose:  80 mg* Dosing weight:  68   *Administration not included in total     Date/Time Rate/Dose/Volume Action       11/28/23  0117 40 mg Given     11/29/23  0112 40 mg Given      1253 *Not included in total Held by provider     11/30/23  0100 *40 mg Missed      1052 *Not included in total Unheld by provider               polyethylene glycol (Glycolax, Miralax) packet 17 g (g) Total dose:  0 g* Dosing weight:  68   *Administration not included in total     Date/Time Rate/Dose/Volume Action       11/28/23  0900 *17 g Missed     11/29/23  0900 *17 g Missed     11/30/23  0900 *17 g Missed               ketorolac (Toradol) injection 30 mg (mg) Total dose:  240 mg Dosing weight:  68      Date/Time Rate/Dose/Volume Action       11/28/23  0400 30 mg Given       1007 30 mg Given      1609 30 mg Given      2159 30 mg Given     11/29/23  0437 30 mg Given      1036 30 mg Given      1612 30 mg Given      2209 30 mg Given               pantoprazole (ProtoNix) EC tablet 40 mg (mg) Total dose:  40 mg* Dosing weight:  68   *Administration not included in total     Date/Time Rate/Dose/Volume Action       11/28/23  0700 40 mg Given     11/29/23 0700 *40 mg Missed     11/30/23 0700 *40 mg Missed               iohexol (OMNIPaque) 350 mg iodine/mL solution 80 mL (mL) Total volume:  80 mL Dosing weight:  68      Date/Time Rate/Dose/Volume Action       11/28/23  1023 80 mL Given               sennosides-docusate sodium (Promise-Colace) 8.6-50 mg per tablet 2 tablet (tablet) Total dose:  4 tablet* Dosing weight:  68   *Administration not included in total     Date/Time Rate/Dose/Volume Action       11/28/23 1517 2 tablet Given      2003 2 tablet Given     11/29/23 0900 *2 tablet Missed      2100 *2 tablet Missed     11/30/23 0900 *2 tablet Missed               fentaNYL PF (Sublimaze) injection (mcg) Total dose:  50 mcg      Date/Time Rate/Dose/Volume Action       11/30/23  1013 50 mcg Given               midazolam (Versed) injection (mg) Total dose:  2 mg      Date/Time Rate/Dose/Volume Action       11/30/23  1013 2 mg Given                     See detailed result report with images in PACS.    The patient tolerated the procedure well without incident or complication and is in stable condition.

## 2023-11-30 NOTE — PROGRESS NOTES
Occupational Therapy                 Therapy Communication Note    Patient Name: Joellen Narayan  MRN: [id number]  Today's Date: 11/30/2023     Discipline: Occupational Therapy    OT screen: pt up and indep with func mob, toileting /ADLs; denies additional assist for ADLs and functional mobility /with no concerns. Pt does not demo need for skilled OT services at this time, will d/c OT orders.      Petra Antonio (OTR/L, OTD)

## 2023-11-30 NOTE — PRE-PROCEDURE NOTE
Interventional Radiology Preprocedure Note    Indication for procedure: The encounter diagnosis was Sickle cell pain crisis (CMS/HCC). Enlarged retroperitoneal lymph nodes.     Relevant review of systems: NA    Relevant Labs:   Lab Results   Component Value Date    CREATININE 0.64 11/29/2023    EGFR >90 11/29/2023    INR 1.3 (H) 11/28/2023    PROTIME 15.0 (H) 11/28/2023       Planned Sedation/Anesthesia: Minimal    Airway assessment: normal    Directed physical examination:    Physical Exam  HENT:      Head: Normocephalic.   Cardiovascular:      Rate and Rhythm: Normal rate.   Pulmonary:      Effort: Pulmonary effort is normal.   Neurological:      Mental Status: He is alert.          Mallampati: II (hard and soft palate, upper portion of tonsils anduvula visible)    ASA Score: ASA 2 - Patient with mild systemic disease with no functional limitations    Benefits, risks and alternatives of procedure and planned sedation have been discussed with the patient and/or their representative. All questions answered and they agree to proceed.

## 2023-11-30 NOTE — PROGRESS NOTES
Spiritual Care Visit    Clinical Encounter Type  Visited With: Patient  Routine Visit: Introduction     introduced self and role to patient Joellen Narayan. Patient expressed he was in pain from his biopsy. Patient shared a bit about his family and his new job before receiving a call.  excused self from the room. Spiritual Care remains available as needed/requested.    Rev. Diana Álvarez MDiv, [facility]

## 2023-11-30 NOTE — CARE PLAN
The clinical goals for the shift include patient will rate pain <6/10 by end of shift    Patient had minimal pain this AM, but then went to IR for biopsy. Also RUQ ultrasound. This afternoon, pain increased to 10/10  at incision site. Gave oxycodone. Pain did not decrease. Received the breakthrough dilaudid 2 hours later. Up ad mary jo. RN talked to patient about music & art therapy. He was interested in both, so RN  put in orders. Patient remained safe this shift. Claire Nowak RN

## 2023-12-01 VITALS
DIASTOLIC BLOOD PRESSURE: 56 MMHG | OXYGEN SATURATION: 91 % | WEIGHT: 150 LBS | HEART RATE: 71 BPM | TEMPERATURE: 98.8 F | SYSTOLIC BLOOD PRESSURE: 116 MMHG | RESPIRATION RATE: 16 BRPM | HEIGHT: 74 IN | BODY MASS INDEX: 19.25 KG/M2

## 2023-12-01 LAB
ABO GROUP (TYPE) IN BLOOD: NORMAL
ALBUMIN SERPL BCP-MCNC: 4.8 G/DL (ref 3.4–5)
ALP SERPL-CCNC: 121 U/L (ref 33–120)
ALT SERPL W P-5'-P-CCNC: 104 U/L (ref 10–52)
ANION GAP SERPL CALC-SCNC: 11 MMOL/L (ref 10–20)
ANTIBODY SCREEN: NORMAL
AST SERPL W P-5'-P-CCNC: 79 U/L (ref 9–39)
BASO STIPL BLD QL SMEAR: PRESENT
BASOPHILS # BLD MANUAL: 0 X10*3/UL (ref 0–0.1)
BASOPHILS NFR BLD MANUAL: 0 %
BILIRUB SERPL-MCNC: 7.4 MG/DL (ref 0–1.2)
BLOOD EXPIRATION DATE: NORMAL
BUN SERPL-MCNC: 6 MG/DL (ref 6–23)
CALCIUM SERPL-MCNC: 9.7 MG/DL (ref 8.6–10.6)
CHLORIDE SERPL-SCNC: 103 MMOL/L (ref 98–107)
CO2 SERPL-SCNC: 28 MMOL/L (ref 21–32)
CREAT SERPL-MCNC: 0.65 MG/DL (ref 0.5–1.3)
DISPENSE STATUS: NORMAL
EOSINOPHIL # BLD MANUAL: 0.34 X10*3/UL (ref 0–0.7)
EOSINOPHIL NFR BLD MANUAL: 3.5 %
ERYTHROCYTE [DISTWIDTH] IN BLOOD BY AUTOMATED COUNT: 28.9 % (ref 11.5–14.5)
GFR SERPL CREATININE-BSD FRML MDRD: >90 ML/MIN/1.73M*2
GLUCOSE SERPL-MCNC: 75 MG/DL (ref 74–99)
HCT VFR BLD AUTO: 20.2 % (ref 41–52)
HGB BLD-MCNC: 7.2 G/DL (ref 13.5–17.5)
HGB RETIC QN: 27 PG (ref 28–38)
HYPOCHROMIA BLD QL SMEAR: NORMAL
IMM GRANULOCYTES # BLD AUTO: 0.06 X10*3/UL (ref 0–0.7)
IMM GRANULOCYTES NFR BLD AUTO: 0.6 % (ref 0–0.9)
IMMATURE RETIC FRACTION: 37.1 %
LDH SERPL L TO P-CCNC: 375 U/L (ref 84–246)
LYMPHOCYTES # BLD MANUAL: 3.84 X10*3/UL (ref 1.2–4.8)
LYMPHOCYTES NFR BLD MANUAL: 40 %
MCH RBC QN AUTO: 26.1 PG (ref 26–34)
MCHC RBC AUTO-ENTMCNC: 35.6 G/DL (ref 32–36)
MCV RBC AUTO: 73 FL (ref 80–100)
MONOCYTES # BLD MANUAL: 0.66 X10*3/UL (ref 0.1–1)
MONOCYTES NFR BLD MANUAL: 6.9 %
NEUTS SEG # BLD MANUAL: 4.76 X10*3/UL (ref 1.2–7)
NEUTS SEG NFR BLD MANUAL: 49.6 %
NRBC BLD-RTO: 3.7 /100 WBCS (ref 0–0)
PLATELET # BLD AUTO: 328 X10*3/UL (ref 150–450)
POLYCHROMASIA BLD QL SMEAR: NORMAL
POTASSIUM SERPL-SCNC: 4 MMOL/L (ref 3.5–5.3)
PRODUCT BLOOD TYPE: 7300
PRODUCT CODE: NORMAL
PROT SERPL-MCNC: 7.5 G/DL (ref 6.4–8.2)
RBC # BLD AUTO: 2.76 X10*6/UL (ref 4.5–5.9)
RBC MORPH BLD: NORMAL
RETICS #: 0.39 X10*6/UL (ref 0.02–0.12)
RETICS/RBC NFR AUTO: 14.1 % (ref 0.5–2)
RH FACTOR (ANTIGEN D): NORMAL
SCHISTOCYTES BLD QL SMEAR: NORMAL
SICKLE CELLS BLD QL SMEAR: NORMAL
SODIUM SERPL-SCNC: 138 MMOL/L (ref 136–145)
TARGETS BLD QL SMEAR: NORMAL
TOTAL CELLS COUNTED BLD: 115
UNIT ABO: NORMAL
UNIT NUMBER: NORMAL
UNIT RH: NORMAL
UNIT VOLUME: 350
WBC # BLD AUTO: 9.6 X10*3/UL (ref 4.4–11.3)
XM INTEP: NORMAL

## 2023-12-01 PROCEDURE — 2500000001 HC RX 250 WO HCPCS SELF ADMINISTERED DRUGS (ALT 637 FOR MEDICARE OP)

## 2023-12-01 PROCEDURE — 36415 COLL VENOUS BLD VENIPUNCTURE: CPT

## 2023-12-01 PROCEDURE — 85027 COMPLETE CBC AUTOMATED: CPT

## 2023-12-01 PROCEDURE — 2500000004 HC RX 250 GENERAL PHARMACY W/ HCPCS (ALT 636 FOR OP/ED)

## 2023-12-01 PROCEDURE — 36430 TRANSFUSION BLD/BLD COMPNT: CPT

## 2023-12-01 PROCEDURE — 30233N1 TRANSFUSION OF NONAUTOLOGOUS RED BLOOD CELLS INTO PERIPHERAL VEIN, PERCUTANEOUS APPROACH: ICD-10-PCS | Performed by: INTERNAL MEDICINE

## 2023-12-01 PROCEDURE — 85045 AUTOMATED RETICULOCYTE COUNT: CPT

## 2023-12-01 PROCEDURE — 83615 LACTATE (LD) (LDH) ENZYME: CPT

## 2023-12-01 PROCEDURE — P9040 RBC LEUKOREDUCED IRRADIATED: HCPCS

## 2023-12-01 PROCEDURE — 99239 HOSP IP/OBS DSCHRG MGMT >30: CPT

## 2023-12-01 PROCEDURE — 80053 COMPREHEN METABOLIC PANEL: CPT

## 2023-12-01 PROCEDURE — 85007 BL SMEAR W/DIFF WBC COUNT: CPT

## 2023-12-01 PROCEDURE — 86901 BLOOD TYPING SEROLOGIC RH(D): CPT

## 2023-12-01 PROCEDURE — 96372 THER/PROPH/DIAG INJ SC/IM: CPT

## 2023-12-01 RX ORDER — OXYCODONE HYDROCHLORIDE 5 MG/1
10 TABLET ORAL ONCE
Status: COMPLETED | OUTPATIENT
Start: 2023-12-01 | End: 2023-12-01

## 2023-12-01 RX ADMIN — ENOXAPARIN SODIUM 40 MG: 100 INJECTION SUBCUTANEOUS at 01:21

## 2023-12-01 RX ADMIN — OXYCODONE HYDROCHLORIDE 15 MG: 5 TABLET ORAL at 00:33

## 2023-12-01 RX ADMIN — HYDROMORPHONE HYDROCHLORIDE 1 MG: 1 INJECTION, SOLUTION INTRAMUSCULAR; INTRAVENOUS; SUBCUTANEOUS at 14:27

## 2023-12-01 RX ADMIN — OXYCODONE HYDROCHLORIDE 10 MG: 5 TABLET ORAL at 02:01

## 2023-12-01 RX ADMIN — HYDROMORPHONE HYDROCHLORIDE 1 MG: 1 INJECTION, SOLUTION INTRAMUSCULAR; INTRAVENOUS; SUBCUTANEOUS at 02:57

## 2023-12-01 RX ADMIN — OXYCODONE HYDROCHLORIDE 15 MG: 5 TABLET ORAL at 16:26

## 2023-12-01 ASSESSMENT — PAIN - FUNCTIONAL ASSESSMENT
PAIN_FUNCTIONAL_ASSESSMENT: 0-10

## 2023-12-01 ASSESSMENT — COGNITIVE AND FUNCTIONAL STATUS - GENERAL
DAILY ACTIVITIY SCORE: 24
MOBILITY SCORE: 24

## 2023-12-01 ASSESSMENT — PAIN SCALES - GENERAL
PAINLEVEL_OUTOF10: 6
PAINLEVEL_OUTOF10: 8
PAINLEVEL_OUTOF10: 6
PAINLEVEL_OUTOF10: 1
PAINLEVEL_OUTOF10: 10 - WORST POSSIBLE PAIN
PAINLEVEL_OUTOF10: 10 - WORST POSSIBLE PAIN
PAINLEVEL_OUTOF10: 5 - MODERATE PAIN
PAINLEVEL_OUTOF10: 6
PAINLEVEL_OUTOF10: 8
PAINLEVEL_OUTOF10: 10 - WORST POSSIBLE PAIN
PAINLEVEL_OUTOF10: 7
PAINLEVEL_OUTOF10: 10 - WORST POSSIBLE PAIN

## 2023-12-01 NOTE — CARE PLAN
The patient's goals for the shift include      The clinical goals for the shift include Patient will have decreased pain this shift 12/1/23 [id number]

## 2023-12-01 NOTE — PROGRESS NOTES
Spiritual Care Visit    Clinical Encounter Type  Visited With: Patient  Routine Visit: Follow-up  Continue Visiting: Yes    Taxonomy  Intended Effects: Build relationship of care and support, Demonstrate caring and concern  Methods: Encourage self reflection, Explore natacha and values, Offer support  Interventions: Active listening, Ask guided questions about natacha, Discuss spirituality/Sikh with someone, [place]     followed up with patient Joellen Narayan. Patient shared he was feeling better today and was looking forward to discharge. Patient shared that he is Yazdanism and is reconnecting with his natacha, especially through reading the Bible and prayer.  held space for patient to reflect on the positive changes he's made this year, the feelings of purpose that have been instilled in him, and his dad's life.  provided support through reflective listening, supportive conversation, and prayer. Patient was appreciative of visit and did not have any further needs at this time. Spiritual Care remains available as needed/requested.    Rev. Diana Álvarez MDiv, [facility]

## 2023-12-01 NOTE — DISCHARGE SUMMARY
Discharge Diagnosis  Sickle cell pain crisis (CMS/HCC)      Test Results Pending At Discharge  Pending Labs       Order Current Status    Flow Cytometry Test Collected (11/30/23 1043)    Flow Cytometry Test Collected (11/30/23 1043)    Surgical Pathology Exam In process    Type and screen In process    CBC and Auto Differential Preliminary result    Reticulocytes Preliminary result            Hospital Course   Mr. Joellen Narayan is a 23 year old male with PMHx of Sickle Cell disease (HbSS c/b dactylitis, mild splenic sequestration in 2001, priapism, hx of acute chest syndrome 2/2023), LVH, and nocturnal hypoxia (intermittently in 2l NC at home) who presents to [facility] ED with complaints of right foot/RLE pain consistent with his typical sickle cell pain. Hgb and lysis labs stable. CXR pending. Admitted for further management. Started IV dilaudid for pain management. Of note, he was previously found to have retroperitoneal lymphadenopathy in 4/2022. 11/18/22 pt had lymph node bx that showed atypical lymphoid infiltrate, insufficient  for lymphoma diagnosis. PET/CT 12/6/22 showing retroperitoneal lymphadenopathy. Pt followed up with Dr. Stoll 12/16/22 with plans for larger bx with Dr. Marte, however pt was lost to follow up. 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 Hematology consulted 11/28, rec consulting surg obc for excisional LN bx. Surg onc consulted 11/29, rec IR consult for biopsy. 11/29 IR consult placed, s/p biopsy 11/30. Transaminitis noted 11/29, now downtrending 12/1. RUQ US (11/30) showed hepatomegaly and cholelithiasis with no signs of cholecystitis. GI FUV requested for cholelithiasis. 11/30 Hgb 6.6, pt initially refused blood but 12/1 agreed to receive 1u of pRBC. Pt signed consent. Plan to transfuse 1u pRBC prior to discharge. Pt states he does not need  refills on discharge. Discharge home with home noc 2L O2. Pt educated on the importance of taking home medications as prescribed and attending outpt appointments. Pt has FUV Sickle cell 12/13, GI 12/21, Heme 3/21/2024, Dr. Stoll 12/28 for biopsy results.     On the day of discharge, the patient reported feeling well and pain was controlled. Vitals and labs were stable. On exam:  Constitutional: Awake, NAD  ENMT: mucous membranes moist, no apparent injury, no lesions seen  Head/Neck: NCAT  Respiratory/Thorax: CTAB, no wheezing  Cardiovascular: RRR, S1+S2, no murmur  Gastrointestinal: Soft, NT, nondistended, +BS  Extremities: No LE edema  Psychological: Appropriate mood and behavior  Skin: no rash noted    >30 minutes was spent on the discharge and planning of this patient.      Home Medications     Medication List      You have not been prescribed any medications.       Outpatient Follow-Up  Future Appointments   Date Time Provider Department Center   12/13/2023  3:00 PM Ian Cruz MD [id number] Academic   12/21/2023  2:30 PM Min Glover MD [id number] Academic   3/21/2024 10:00 AM Norberto Amaya MD [id number] Academic       Nikki Couch PA-C

## 2023-12-01 NOTE — DISCHARGE INSTRUCTIONS
For pain medication refills please call the sickle cell prescription line at [phone].  For scheduling follow up with sickle cell team call [phone].  If you feel your pain is uncontrolled at home, please call the sickle cell acute care clinic ( if you qualify)  Monday-Friday, 8a-2p at [phone].

## 2023-12-01 NOTE — CARE PLAN
The patient's goals for the shift include      The clinical goals for the shift include patient will report decresed pain score with pain interventions this shift

## 2023-12-04 ENCOUNTER — TELEPHONE (OUTPATIENT)
Dept: HEMATOLOGY/ONCOLOGY | Facility: HOSPITAL | Age: 23
End: 2023-12-04

## 2023-12-04 NOTE — PROGRESS NOTES
Art Therapy Note    Joellen Narayan x  Therapy Session  Referral Type: New referral this admission  Visit Type: New visit  Session Start Time: 1650  Session End Time: 1657  Intervention Delivery: In-person  Conflict of Service: None  Number of family members present: 0  Family Present for Session: None  Number of staff members present: 0              Treatment/Interventions  Art Therapy Interventions: Assessment  Interruption: No  Patient Fell Asleep at End of Session: No    Post-assessment  Total Session Time (min): 7 minutes    Narrative  Assessment Detail: ATR made a vist to Pt.'s room to follow up on referral made, introduce, educate and assess AT needs and wnts.  Pt sitting up in his bedside chair watching TV.  Pt welcomed visit.  Pt shared he was being DC today but was interested in what AT was and could provide.  He expressed liking to look at art and appreciated art. ATR did some more education otnhe bvenefits of how doing art can be therapuetic to divert from pain, provide meaningful work, and self expression.  Pt open to AT's potetnitial and said he would most definely utilize services int he potential future.  He was even agreeable to trying some coloring of mandalas.  Pt was given and chose some mandala images to take home with him along with some colored pencils.  Pt expressed appreiation for the time, education, and session.  He looks forwrd to doing andhaving AT services int he future.    Education Documentation  No documentation found.

## 2023-12-07 LAB
CELL COUNT (BLOOD): 1.84 X10*3/UL
CELL POPULATIONS: NORMAL
DIAGNOSIS: NORMAL
FLOW DIFFERENTIAL: NORMAL
FLOW TEST ORDERED: NORMAL
LAB AP ASR DISCLAIMER: NORMAL
LAB TEST METHOD: NORMAL
LABORATORY COMMENT REPORT: NORMAL
NUMBER OF CELLS COLLECTED: NORMAL
PATH REPORT.FINAL DX SPEC: NORMAL
PATH REPORT.GROSS SPEC: NORMAL
PATH REPORT.RELEVANT HX SPEC: NORMAL
PATH REPORT.TOTAL CANCER: NORMAL
PATH REPORT.TOTAL CANCER: NORMAL
SIGNATURE COMMENT: NORMAL
SPECIMEN VIABILITY: NORMAL

## 2023-12-08 NOTE — DOCUMENTATION CLARIFICATION NOTE
"    PATIENT:               AMARIS BLANCO  ACCT #:                  [id number]  MRN:                       [id number]  :                       2000  ADMIT DATE:       2023 7:04 PM  DISCH DATE:        2023 5:00 PM  RESPONDING PROVIDER #:        [id number]          PROVIDER RESPONSE TEXT:    I concur with the pathology report findings and they are clinically significant    CDI QUERY TEXT:    [facility]_Path Results Simple      Instruction:    Based on your assessment of the patient and the clinical information, please provide the requested documentation by clicking on the appropriate radio button and enter any additional information if prompted.    Question: Please document whether you concur or do not concur with the pathology report findings    When answering this query, please exercise your independent professional judgment. The fact that a question is being asked, does not imply that any particular answer is desired or expected.    The patient's clinical indicators include:  Clinical Information: 24 yo M with PMHx of Sickle Cell disease, LVH, and nocturnal hypoxia admitted for Sickle Cell pain. Pt also worked up for retroperitoneal lymphadenopathy and is s/p biopsy of enlarged left retroperitoneal lymph node on .    Clinical Indicators and Pathology Findings:  Pathology report from :  \"Final Diagnosis  Result:  A. 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    Note: There is preserved nodular configuration but with increased background T cells within the nodules, T cell rich nodular pattern or Fan et al, Immunoarchitectural Pattern D of NLPHL. Variant morphologic manifestations of NLPHL,  including pattern D, has been associated with less indolent behavior. The World Health Organization WHO 2017 and WHO  classification recognizes variant " "patterns C, D, E and F as falling into this less indolent category. The 2022 International Consensus Classification ICC considers patterns A and B as grade 1, more indolent, in contrast with patterns D, E, F which are considered grade 2, less indolent, and for which more aggressive clinical management may be considered, as clinically indicated. The ICC recommends considering pattern C as grade 1 until additional studies to clarify its clinical behavior, which are currently ongoing, are completed. Clinical correlation is recommended.\"    Flow Cytometry 11/30:  \"Diagnosis  Result:  No clonal B cell or T cell population identified\"    Treatment:  -biopsy of enlarged left retroperitoneal lymph node 11/30      Risk Factors:  -retroperitoneal lymphadenopathy  Options provided:  -- I concur with the pathology report findings and they are clinically significant  -- I do not concur with the pathology report findings  -- Other - I will add my own diagnosis  -- Refer to Clinical Documentation Reviewer    Query created by: Shilpi Andrew on 12/8/2023 9:08 AM      Electronically signed by:  CATHERINE HARVEY MD 12/8/2023 1:39 PM          "

## 2023-12-10 ENCOUNTER — APPOINTMENT (OUTPATIENT)
Dept: RADIOLOGY | Facility: HOSPITAL | Age: 23
End: 2023-12-10
Payer: COMMERCIAL

## 2023-12-10 ENCOUNTER — HOSPITAL ENCOUNTER (EMERGENCY)
Facility: HOSPITAL | Age: 23
Discharge: HOME | End: 2023-12-11
Attending: EMERGENCY MEDICINE
Payer: COMMERCIAL

## 2023-12-10 DIAGNOSIS — D57.00 SICKLE CELL DISEASE WITH CRISIS (MULTI): Primary | ICD-10-CM

## 2023-12-10 LAB
ALBUMIN SERPL BCP-MCNC: 5 G/DL (ref 3.4–5)
ALP SERPL-CCNC: 95 U/L (ref 33–120)
ALT SERPL W P-5'-P-CCNC: 31 U/L (ref 10–52)
ANION GAP SERPL CALC-SCNC: 16 MMOL/L (ref 10–20)
APTT PPP: 27 SECONDS (ref 27–38)
AST SERPL W P-5'-P-CCNC: 56 U/L (ref 9–39)
BASOPHILS # BLD AUTO: 0.08 X10*3/UL (ref 0–0.1)
BASOPHILS NFR BLD AUTO: 0.5 %
BILIRUB SERPL-MCNC: 6.5 MG/DL (ref 0–1.2)
BUN SERPL-MCNC: 10 MG/DL (ref 6–23)
CALCIUM SERPL-MCNC: 9.9 MG/DL (ref 8.6–10.6)
CHLORIDE SERPL-SCNC: 106 MMOL/L (ref 98–107)
CO2 SERPL-SCNC: 21 MMOL/L (ref 21–32)
CREAT SERPL-MCNC: 0.76 MG/DL (ref 0.5–1.3)
EOSINOPHIL # BLD AUTO: 0.31 X10*3/UL (ref 0–0.7)
EOSINOPHIL NFR BLD AUTO: 2.1 %
ERYTHROCYTE [DISTWIDTH] IN BLOOD BY AUTOMATED COUNT: 27.6 % (ref 11.5–14.5)
GFR SERPL CREATININE-BSD FRML MDRD: >90 ML/MIN/1.73M*2
GLUCOSE SERPL-MCNC: 94 MG/DL (ref 74–99)
HCT VFR BLD AUTO: 25.6 % (ref 41–52)
HGB BLD-MCNC: 9.7 G/DL (ref 13.5–17.5)
HGB RETIC QN: 28 PG (ref 28–38)
HOWELL-JOLLY BOD BLD QL SMEAR: PRESENT
IMM GRANULOCYTES # BLD AUTO: 0.06 X10*3/UL (ref 0–0.7)
IMM GRANULOCYTES NFR BLD AUTO: 0.4 % (ref 0–0.9)
IMMATURE RETIC FRACTION: 27.1 %
INR PPP: 1.3 (ref 0.9–1.1)
LDH SERPL L TO P-CCNC: 432 U/L (ref 84–246)
LYMPHOCYTES # BLD AUTO: 7.24 X10*3/UL (ref 1.2–4.8)
LYMPHOCYTES NFR BLD AUTO: 48.5 %
MCH RBC QN AUTO: 27.9 PG (ref 26–34)
MCHC RBC AUTO-ENTMCNC: 37.9 G/DL (ref 32–36)
MCV RBC AUTO: 74 FL (ref 80–100)
MONOCYTES # BLD AUTO: 1.2 X10*3/UL (ref 0.1–1)
MONOCYTES NFR BLD AUTO: 8 %
NEUTROPHILS # BLD AUTO: 6.05 X10*3/UL (ref 1.2–7.7)
NEUTROPHILS NFR BLD AUTO: 40.5 %
NRBC BLD-RTO: 0.5 /100 WBCS (ref 0–0)
PLATELET # BLD AUTO: 460 X10*3/UL (ref 150–450)
POLYCHROMASIA BLD QL SMEAR: NORMAL
POTASSIUM SERPL-SCNC: 3.9 MMOL/L (ref 3.5–5.3)
PROT SERPL-MCNC: 7.7 G/DL (ref 6.4–8.2)
PROTHROMBIN TIME: 14.7 SECONDS (ref 9.8–12.8)
RBC # BLD AUTO: 3.48 X10*6/UL (ref 4.5–5.9)
RBC MORPH BLD: NORMAL
RETICS #: 0.65 X10*6/UL (ref 0.02–0.12)
RETICS/RBC NFR AUTO: 18.6 % (ref 0.5–2)
SICKLE CELLS BLD QL SMEAR: NORMAL
SODIUM SERPL-SCNC: 139 MMOL/L (ref 136–145)
TARGETS BLD QL SMEAR: NORMAL
WBC # BLD AUTO: 14.9 X10*3/UL (ref 4.4–11.3)

## 2023-12-10 PROCEDURE — 80053 COMPREHEN METABOLIC PANEL: CPT | Performed by: EMERGENCY MEDICINE

## 2023-12-10 PROCEDURE — 36415 COLL VENOUS BLD VENIPUNCTURE: CPT | Performed by: STUDENT IN AN ORGANIZED HEALTH CARE EDUCATION/TRAINING PROGRAM

## 2023-12-10 PROCEDURE — 2500000004 HC RX 250 GENERAL PHARMACY W/ HCPCS (ALT 636 FOR OP/ED): Performed by: STUDENT IN AN ORGANIZED HEALTH CARE EDUCATION/TRAINING PROGRAM

## 2023-12-10 PROCEDURE — 96374 THER/PROPH/DIAG INJ IV PUSH: CPT

## 2023-12-10 PROCEDURE — 83615 LACTATE (LD) (LDH) ENZYME: CPT | Performed by: EMERGENCY MEDICINE

## 2023-12-10 PROCEDURE — 85730 THROMBOPLASTIN TIME PARTIAL: CPT | Performed by: EMERGENCY MEDICINE

## 2023-12-10 PROCEDURE — 85045 AUTOMATED RETICULOCYTE COUNT: CPT | Performed by: EMERGENCY MEDICINE

## 2023-12-10 PROCEDURE — 96376 TX/PRO/DX INJ SAME DRUG ADON: CPT

## 2023-12-10 PROCEDURE — 85610 PROTHROMBIN TIME: CPT | Performed by: STUDENT IN AN ORGANIZED HEALTH CARE EDUCATION/TRAINING PROGRAM

## 2023-12-10 PROCEDURE — 71046 X-RAY EXAM CHEST 2 VIEWS: CPT | Mod: FY

## 2023-12-10 PROCEDURE — 85025 COMPLETE CBC W/AUTO DIFF WBC: CPT | Performed by: EMERGENCY MEDICINE

## 2023-12-10 PROCEDURE — 86900 BLOOD TYPING SEROLOGIC ABO: CPT | Performed by: EMERGENCY MEDICINE

## 2023-12-10 PROCEDURE — 99285 EMERGENCY DEPT VISIT HI MDM: CPT | Mod: 25

## 2023-12-10 PROCEDURE — 36415 COLL VENOUS BLD VENIPUNCTURE: CPT | Performed by: EMERGENCY MEDICINE

## 2023-12-10 PROCEDURE — 99285 EMERGENCY DEPT VISIT HI MDM: CPT | Performed by: EMERGENCY MEDICINE

## 2023-12-10 PROCEDURE — 84484 ASSAY OF TROPONIN QUANT: CPT | Performed by: STUDENT IN AN ORGANIZED HEALTH CARE EDUCATION/TRAINING PROGRAM

## 2023-12-10 PROCEDURE — 96375 TX/PRO/DX INJ NEW DRUG ADDON: CPT

## 2023-12-10 PROCEDURE — 71046 X-RAY EXAM CHEST 2 VIEWS: CPT | Performed by: SURGERY

## 2023-12-10 PROCEDURE — 93005 ELECTROCARDIOGRAM TRACING: CPT

## 2023-12-10 RX ORDER — HYDROMORPHONE HYDROCHLORIDE 1 MG/ML
1 INJECTION, SOLUTION INTRAMUSCULAR; INTRAVENOUS; SUBCUTANEOUS EVERY 30 MIN PRN
Status: COMPLETED | OUTPATIENT
Start: 2023-12-10 | End: 2023-12-11

## 2023-12-10 RX ORDER — ONDANSETRON HYDROCHLORIDE 2 MG/ML
4 INJECTION, SOLUTION INTRAVENOUS ONCE
Status: COMPLETED | OUTPATIENT
Start: 2023-12-10 | End: 2023-12-10

## 2023-12-10 RX ADMIN — ONDANSETRON 4 MG: 2 INJECTION INTRAMUSCULAR; INTRAVENOUS at 22:51

## 2023-12-10 RX ADMIN — HYDROMORPHONE HYDROCHLORIDE 1 MG: 1 INJECTION, SOLUTION INTRAMUSCULAR; INTRAVENOUS; SUBCUTANEOUS at 22:50

## 2023-12-10 RX ADMIN — HYDROMORPHONE HYDROCHLORIDE 1 MG: 1 INJECTION, SOLUTION INTRAMUSCULAR; INTRAVENOUS; SUBCUTANEOUS at 23:36

## 2023-12-10 ASSESSMENT — LIFESTYLE VARIABLES
HAVE YOU EVER FELT YOU SHOULD CUT DOWN ON YOUR DRINKING: NO
EVER FELT BAD OR GUILTY ABOUT YOUR DRINKING: NO
HAVE PEOPLE ANNOYED YOU BY CRITICIZING YOUR DRINKING: NO
EVER HAD A DRINK FIRST THING IN THE MORNING TO STEADY YOUR NERVES TO GET RID OF A HANGOVER: NO
REASON UNABLE TO ASSESS: NO

## 2023-12-10 ASSESSMENT — PAIN DESCRIPTION - FREQUENCY: FREQUENCY: CONSTANT/CONTINUOUS

## 2023-12-10 ASSESSMENT — PAIN SCALES - GENERAL
PAINLEVEL_OUTOF10: 10 - WORST POSSIBLE PAIN

## 2023-12-10 ASSESSMENT — COLUMBIA-SUICIDE SEVERITY RATING SCALE - C-SSRS
6. HAVE YOU EVER DONE ANYTHING, STARTED TO DO ANYTHING, OR PREPARED TO DO ANYTHING TO END YOUR LIFE?: NO
1. IN THE PAST MONTH, HAVE YOU WISHED YOU WERE DEAD OR WISHED YOU COULD GO TO SLEEP AND NOT WAKE UP?: NO
2. HAVE YOU ACTUALLY HAD ANY THOUGHTS OF KILLING YOURSELF?: NO

## 2023-12-10 ASSESSMENT — PAIN DESCRIPTION - PAIN TYPE
TYPE: ACUTE PAIN
TYPE: ACUTE PAIN

## 2023-12-10 ASSESSMENT — PAIN - FUNCTIONAL ASSESSMENT
PAIN_FUNCTIONAL_ASSESSMENT: 0-10

## 2023-12-10 ASSESSMENT — PAIN DESCRIPTION - LOCATION
LOCATION: CHEST

## 2023-12-10 ASSESSMENT — PAIN SCALES - PAIN ASSESSMENT IN ADVANCED DEMENTIA (PAINAD): BREATHING: NORMAL

## 2023-12-11 ENCOUNTER — APPOINTMENT (OUTPATIENT)
Dept: RADIOLOGY | Facility: HOSPITAL | Age: 23
End: 2023-12-11
Payer: COMMERCIAL

## 2023-12-11 VITALS
HEIGHT: 74 IN | WEIGHT: 140 LBS | OXYGEN SATURATION: 98 % | HEART RATE: 71 BPM | SYSTOLIC BLOOD PRESSURE: 108 MMHG | TEMPERATURE: 98.4 F | DIASTOLIC BLOOD PRESSURE: 65 MMHG | BODY MASS INDEX: 17.97 KG/M2 | RESPIRATION RATE: 20 BRPM

## 2023-12-11 LAB
ANTIBODY SCREEN: NORMAL
CARDIAC TROPONIN I PNL SERPL HS: 8 NG/L (ref 0–53)

## 2023-12-11 PROCEDURE — 96374 THER/PROPH/DIAG INJ IV PUSH: CPT | Mod: 59

## 2023-12-11 PROCEDURE — 2550000001 HC RX 255 CONTRASTS: Performed by: STUDENT IN AN ORGANIZED HEALTH CARE EDUCATION/TRAINING PROGRAM

## 2023-12-11 PROCEDURE — 71275 CT ANGIOGRAPHY CHEST: CPT

## 2023-12-11 PROCEDURE — 96376 TX/PRO/DX INJ SAME DRUG ADON: CPT

## 2023-12-11 PROCEDURE — 2500000004 HC RX 250 GENERAL PHARMACY W/ HCPCS (ALT 636 FOR OP/ED): Performed by: STUDENT IN AN ORGANIZED HEALTH CARE EDUCATION/TRAINING PROGRAM

## 2023-12-11 PROCEDURE — 71275 CT ANGIOGRAPHY CHEST: CPT | Performed by: SURGERY

## 2023-12-11 RX ORDER — HYDROMORPHONE HYDROCHLORIDE 1 MG/ML
1 INJECTION, SOLUTION INTRAMUSCULAR; INTRAVENOUS; SUBCUTANEOUS ONCE
Status: COMPLETED | OUTPATIENT
Start: 2023-12-11 | End: 2023-12-11

## 2023-12-11 RX ADMIN — IOHEXOL 80 ML: 350 INJECTION, SOLUTION INTRAVENOUS at 01:48

## 2023-12-11 RX ADMIN — HYDROMORPHONE HYDROCHLORIDE 1 MG: 1 INJECTION, SOLUTION INTRAMUSCULAR; INTRAVENOUS; SUBCUTANEOUS at 04:07

## 2023-12-11 RX ADMIN — HYDROMORPHONE HYDROCHLORIDE 1 MG: 1 INJECTION, SOLUTION INTRAMUSCULAR; INTRAVENOUS; SUBCUTANEOUS at 00:36

## 2023-12-11 ASSESSMENT — PAIN DESCRIPTION - PAIN TYPE: TYPE: ACUTE PAIN

## 2023-12-11 ASSESSMENT — PAIN SCALES - GENERAL
PAINLEVEL_OUTOF10: 8
PAINLEVEL_OUTOF10: 7

## 2023-12-11 ASSESSMENT — PAIN DESCRIPTION - LOCATION
LOCATION: CHEST
LOCATION: CHEST

## 2023-12-11 ASSESSMENT — PAIN - FUNCTIONAL ASSESSMENT
PAIN_FUNCTIONAL_ASSESSMENT: 0-10
PAIN_FUNCTIONAL_ASSESSMENT: 0-10

## 2023-12-11 NOTE — DISCHARGE INSTRUCTIONS
Your symptoms are likely due to his sickle cell pain crisis.  Please make sure that you follow-up with your sickle cell team.  If anything else changes, we are happy to see you again in the emergency room.  Please make sure that you are staying well-hydrated as this helps prevent acute crises.

## 2023-12-11 NOTE — ED PROVIDER NOTES
CC: Sickle Cell Pain Crisis     HPI:  Joellen Narayan is a 23 y.o. male with a past medical history of sickle cell disease presenting to the emergency department today due to concerns for chest pain.  Patient states that this pain started earlier today.  It feels somewhat similar to his sickle cell crisis however feels slightly worse.  He is not having any shortness of breath, numbness, tingling, or pain anywhere else.  Denies any recent fevers chills or sick contacts.  He states that he did have acute chest in the past however was when he was young and does not remember what that feels like.  He denies any other concerns at this time.  He tried taking naproxen at home however did not relieve his pain.    Limitations to History: none  Additional History provided by: N/A    External Records Reviewed:  Recent available ED and inpatient notes reviewed in EMR.    PMHx/PSHx:  Per HPI.   - has a past medical history of Corrosion of unspecified body region, unspecified degree (12/31/2014), Personal history of diseases of the blood and blood-forming organs and certain disorders involving the immune mechanism, Personal history of other (healed) physical injury and trauma (01/03/2015), Personal history of other diseases of the circulatory system, Personal history of other diseases of the circulatory system, and Rash and other nonspecific skin eruption (09/09/2014).  - has a past surgical history that includes IR CVC tunneled (6/21/2022); CT guided percutaneous biopsy LYMPH node superficial (11/18/2022); and CT guided percutaneous biopsy retroperitoneum (11/30/2023).    Medications:  Reviewed in EMR. See EMR for complete list of medications and doses.    Allergies:  Amoxicillin and Ceftriaxone    Social History:  - Tobacco:  reports that he has never smoked. He has been exposed to tobacco smoke. He has never used smokeless tobacco.   - Alcohol:  reports no history of alcohol use.   - Illicit Drugs:  reports no history of drug  use.     ROS:  Per HPI.     ???????????????????????????????????????????????????????????????  Triage Vitals:  T 36.9 °C (98.4 °F)  HR 76  /70  RR 22  O2 97 %      Physical Exam  Vitals and nursing note reviewed.   Constitutional:       General: He is not in acute distress.     Appearance: He is well-developed.   HENT:      Head: Normocephalic and atraumatic.   Eyes:      Conjunctiva/sclera: Conjunctivae normal.   Cardiovascular:      Rate and Rhythm: Normal rate and regular rhythm.      Heart sounds: No murmur heard.  Pulmonary:      Effort: Pulmonary effort is normal. No respiratory distress.      Breath sounds: Normal breath sounds.   Abdominal:      Palpations: Abdomen is soft.      Tenderness: There is no abdominal tenderness.   Musculoskeletal:         General: No swelling.      Cervical back: Neck supple.   Skin:     General: Skin is warm and dry.      Capillary Refill: Capillary refill takes less than 2 seconds.   Neurological:      Mental Status: He is alert.   Psychiatric:         Mood and Affect: Mood normal.       ???????????????????????????????????????????????????????????????  ED Course:  ED Course as of 12/11/23 0434   Mon Dec 11, 2023   0047 EKG shows a normal rate and rhythm, normal axis, normal intervals. And normal ST and T wave pattern with no evidence of acute ischemia or other acute findings, slight motion artifact however otherwise stable from previous EKGs obtained in November 2023 [SC]      ED Course User Index  [SC] Ashly Echols MD         Diagnoses as of 12/11/23 0434   Sickle cell disease with crisis (CMS/HCC)       EKG & Images:  Independently reviewed, See ED Course      MDM:  - The patient is a 23-year-old male presenting to the emergency department today due to chest pain.  He states that the pain started earlier today.  Feels somewhat similar to his regular acute chest episodes.  Lab work seems to be roughly baseline however it is elevated which could indicate a sickle  cell crisis.  EKG was nonischemic.  Chest x-ray was unremarkable.  However given his continued chest pain, decided to proceed with CT PE to further rule out a PE.  No additional consolidation or pulmonary embolism noted.  Patient was reevaluated after his care plan which includes 3 doses of 1 mg Dilaudid.  He states that he feels comfortable enough to go home.  As such, patient will be discharged home with return precautions.  He is amenable to this plan.    Final diagnoses:   [D57.00] Sickle cell disease with crisis (CMS/[facility])         Social Determinants Limiting Care:  None identified    Disposition:  Discharge    Ashly Echols MD   Emergency Medicine Resident, PGY3  [facility]     Disclaimer: This note was dictated by speech recognition. Minor errors in transcription may be present    Procedures ? Synthorx last updated 12/11/2023 4:34 AM        Ashly Echols MD  Resident  12/11/23 0434

## 2023-12-12 ENCOUNTER — ANCILLARY PROCEDURE (OUTPATIENT)
Dept: EMERGENCY MEDICINE | Facility: HOSPITAL | Age: 23
End: 2023-12-12
Payer: COMMERCIAL

## 2023-12-12 LAB
ATRIAL RATE: 73 BPM
P AXIS: 38 DEGREES
P OFFSET: 161 MS
P ONSET: 124 MS
PR INTERVAL: 174 MS
Q ONSET: 211 MS
QRS COUNT: 12 BEATS
QRS DURATION: 102 MS
QT INTERVAL: 376 MS
QTC CALCULATION(BAZETT): 414 MS
QTC FREDERICIA: 401 MS
R AXIS: 70 DEGREES
T AXIS: 24 DEGREES
T OFFSET: 399 MS
VENTRICULAR RATE: 73 BPM

## 2023-12-13 ENCOUNTER — APPOINTMENT (OUTPATIENT)
Dept: HEMATOLOGY/ONCOLOGY | Facility: HOSPITAL | Age: 23
End: 2023-12-13
Payer: COMMERCIAL

## 2023-12-18 PROCEDURE — 96375 TX/PRO/DX INJ NEW DRUG ADDON: CPT

## 2023-12-18 PROCEDURE — 99285 EMERGENCY DEPT VISIT HI MDM: CPT | Performed by: EMERGENCY MEDICINE

## 2023-12-18 PROCEDURE — 96376 TX/PRO/DX INJ SAME DRUG ADON: CPT

## 2023-12-18 PROCEDURE — 93010 ELECTROCARDIOGRAM REPORT: CPT | Performed by: EMERGENCY MEDICINE

## 2023-12-18 PROCEDURE — 96372 THER/PROPH/DIAG INJ SC/IM: CPT

## 2023-12-18 PROCEDURE — 96374 THER/PROPH/DIAG INJ IV PUSH: CPT | Mod: 59

## 2023-12-19 ENCOUNTER — ANCILLARY PROCEDURE (OUTPATIENT)
Dept: EMERGENCY MEDICINE | Facility: HOSPITAL | Age: 23
DRG: 812 | End: 2023-12-19
Payer: COMMERCIAL

## 2023-12-19 ENCOUNTER — HOSPITAL ENCOUNTER (INPATIENT)
Facility: HOSPITAL | Age: 23
LOS: 3 days | Discharge: HOME | DRG: 812 | End: 2023-12-24
Attending: EMERGENCY MEDICINE | Admitting: INTERNAL MEDICINE
Payer: COMMERCIAL

## 2023-12-19 ENCOUNTER — APPOINTMENT (OUTPATIENT)
Dept: RADIOLOGY | Facility: HOSPITAL | Age: 23
DRG: 812 | End: 2023-12-19
Payer: COMMERCIAL

## 2023-12-19 DIAGNOSIS — R60.0 LOCALIZED EDEMA: ICD-10-CM

## 2023-12-19 DIAGNOSIS — D57.00 SICKLE CELL PAIN CRISIS (MULTI): Primary | ICD-10-CM

## 2023-12-19 DIAGNOSIS — D57.00 SICKLE-CELL DISEASE WITH PAIN (MULTI): ICD-10-CM

## 2023-12-19 DIAGNOSIS — C81.03 NODULAR LYMPHOCYTE PREDOMINANT HODGKIN LYMPHOMA OF INTRA-ABDOMINAL LYMPH NODES (MULTI): Chronic | ICD-10-CM

## 2023-12-19 PROBLEM — C81.90 HODGKIN LYMPHOMA (MULTI): Chronic | Status: ACTIVE | Noted: 2023-12-19

## 2023-12-19 LAB
ABO GROUP (TYPE) IN BLOOD: NORMAL
ALBUMIN SERPL BCP-MCNC: 4.7 G/DL (ref 3.4–5)
ALP SERPL-CCNC: 94 U/L (ref 33–120)
ALT SERPL W P-5'-P-CCNC: 31 U/L (ref 10–52)
AMPHETAMINES UR QL SCN: ABNORMAL
ANION GAP SERPL CALC-SCNC: 14 MMOL/L (ref 10–20)
ANTIBODY SCREEN: NORMAL
AST SERPL W P-5'-P-CCNC: 36 U/L (ref 9–39)
ATRIAL RATE: 75 BPM
ATRIAL RATE: 81 BPM
BARBITURATES UR QL SCN: ABNORMAL
BASO STIPL BLD QL SMEAR: PRESENT
BASOPHILS # BLD AUTO: 0.06 X10*3/UL (ref 0–0.1)
BASOPHILS NFR BLD AUTO: 0.4 %
BILIRUB SERPL-MCNC: 5.7 MG/DL (ref 0–1.2)
BUN SERPL-MCNC: 4 MG/DL (ref 6–23)
BZE UR QL SCN: ABNORMAL
CALCIUM SERPL-MCNC: 9.5 MG/DL (ref 8.6–10.6)
CANNABINOIDS UR QL SCN: ABNORMAL
CHLORIDE SERPL-SCNC: 107 MMOL/L (ref 98–107)
CO2 SERPL-SCNC: 22 MMOL/L (ref 21–32)
CREAT SERPL-MCNC: 0.57 MG/DL (ref 0.5–1.3)
CREAT UR-MCNC: 203.7 MG/DL (ref 20–370)
DACRYOCYTES BLD QL SMEAR: NORMAL
EOSINOPHIL # BLD AUTO: 0.15 X10*3/UL (ref 0–0.7)
EOSINOPHIL NFR BLD AUTO: 1 %
ERYTHROCYTE [DISTWIDTH] IN BLOOD BY AUTOMATED COUNT: 28.5 % (ref 11.5–14.5)
GFR SERPL CREATININE-BSD FRML MDRD: >90 ML/MIN/1.73M*2
GLUCOSE SERPL-MCNC: 86 MG/DL (ref 74–99)
HCT VFR BLD AUTO: 23.9 % (ref 41–52)
HGB BLD-MCNC: 8.4 G/DL (ref 13.5–17.5)
HGB RETIC QN: 26 PG (ref 28–38)
HYPOCHROMIA BLD QL SMEAR: NORMAL
IMM GRANULOCYTES # BLD AUTO: 0.12 X10*3/UL (ref 0–0.7)
IMM GRANULOCYTES NFR BLD AUTO: 0.8 % (ref 0–0.9)
IMMATURE RETIC FRACTION: 29.3 %
LDH SERPL L TO P-CCNC: 415 U/L (ref 84–246)
LYMPHOCYTES # BLD AUTO: 4.97 X10*3/UL (ref 1.2–4.8)
LYMPHOCYTES NFR BLD AUTO: 32.4 %
MCH RBC QN AUTO: 26.7 PG (ref 26–34)
MCHC RBC AUTO-ENTMCNC: 35.1 G/DL (ref 32–36)
MCV RBC AUTO: 76 FL (ref 80–100)
MONOCYTES # BLD AUTO: 1.68 X10*3/UL (ref 0.1–1)
MONOCYTES NFR BLD AUTO: 10.9 %
NEUTROPHILS # BLD AUTO: 8.38 X10*3/UL (ref 1.2–7.7)
NEUTROPHILS NFR BLD AUTO: 54.5 %
NRBC BLD-RTO: 2.3 /100 WBCS (ref 0–0)
OVALOCYTES BLD QL SMEAR: NORMAL
P AXIS: 49 DEGREES
P AXIS: 63 DEGREES
P OFFSET: 166 MS
P OFFSET: 178 MS
P ONSET: 121 MS
P ONSET: 123 MS
PCP UR QL SCN: ABNORMAL
PLATELET # BLD AUTO: 353 X10*3/UL (ref 150–450)
POLYCHROMASIA BLD QL SMEAR: NORMAL
POTASSIUM SERPL-SCNC: 3.9 MMOL/L (ref 3.5–5.3)
PR INTERVAL: 184 MS
PR INTERVAL: 186 MS
PROT SERPL-MCNC: 7.4 G/DL (ref 6.4–8.2)
Q ONSET: 214 MS
Q ONSET: 215 MS
QRS COUNT: 13 BEATS
QRS COUNT: 13 BEATS
QRS DURATION: 106 MS
QRS DURATION: 108 MS
QT INTERVAL: 342 MS
QT INTERVAL: 370 MS
QTC CALCULATION(BAZETT): 397 MS
QTC CALCULATION(BAZETT): 413 MS
QTC FREDERICIA: 377 MS
QTC FREDERICIA: 398 MS
R AXIS: 79 DEGREES
R AXIS: 88 DEGREES
RBC # BLD AUTO: 3.15 X10*6/UL (ref 4.5–5.9)
RBC MORPH BLD: NORMAL
RETICS #: 0.55 X10*6/UL (ref 0.02–0.12)
RETICS/RBC NFR AUTO: 18.1 % (ref 0.5–2)
RH FACTOR (ANTIGEN D): NORMAL
SCHISTOCYTES BLD QL SMEAR: NORMAL
SICKLE CELLS BLD QL SMEAR: NORMAL
SODIUM SERPL-SCNC: 139 MMOL/L (ref 136–145)
T AXIS: 3 DEGREES
T AXIS: 6 DEGREES
T OFFSET: 386 MS
T OFFSET: 399 MS
TARGETS BLD QL SMEAR: NORMAL
URATE SERPL-MCNC: 4.8 MG/DL (ref 4–7.5)
VENTRICULAR RATE: 75 BPM
VENTRICULAR RATE: 81 BPM
WBC # BLD AUTO: 15.4 X10*3/UL (ref 4.4–11.3)

## 2023-12-19 PROCEDURE — 83020 HEMOGLOBIN ELECTROPHORESIS: CPT | Performed by: NURSE PRACTITIONER

## 2023-12-19 PROCEDURE — 2500000001 HC RX 250 WO HCPCS SELF ADMINISTERED DRUGS (ALT 637 FOR MEDICARE OP): Performed by: NURSE PRACTITIONER

## 2023-12-19 PROCEDURE — 2500000004 HC RX 250 GENERAL PHARMACY W/ HCPCS (ALT 636 FOR OP/ED)

## 2023-12-19 PROCEDURE — 73630 X-RAY EXAM OF FOOT: CPT | Mod: RIGHT SIDE | Performed by: RADIOLOGY

## 2023-12-19 PROCEDURE — 84550 ASSAY OF BLOOD/URIC ACID: CPT | Performed by: NURSE PRACTITIONER

## 2023-12-19 PROCEDURE — 93005 ELECTROCARDIOGRAM TRACING: CPT

## 2023-12-19 PROCEDURE — 80346 BENZODIAZEPINES1-12: CPT | Performed by: NURSE PRACTITIONER

## 2023-12-19 PROCEDURE — 36415 COLL VENOUS BLD VENIPUNCTURE: CPT | Performed by: NURSE PRACTITIONER

## 2023-12-19 PROCEDURE — 71046 X-RAY EXAM CHEST 2 VIEWS: CPT

## 2023-12-19 PROCEDURE — 80307 DRUG TEST PRSMV CHEM ANLYZR: CPT | Performed by: NURSE PRACTITIONER

## 2023-12-19 PROCEDURE — 83615 LACTATE (LD) (LDH) ENZYME: CPT

## 2023-12-19 PROCEDURE — 86901 BLOOD TYPING SEROLOGIC RH(D): CPT | Performed by: NURSE PRACTITIONER

## 2023-12-19 PROCEDURE — 83021 HEMOGLOBIN CHROMOTOGRAPHY: CPT | Performed by: NURSE PRACTITIONER

## 2023-12-19 PROCEDURE — 85025 COMPLETE CBC W/AUTO DIFF WBC: CPT

## 2023-12-19 PROCEDURE — 85045 AUTOMATED RETICULOCYTE COUNT: CPT

## 2023-12-19 PROCEDURE — 36415 COLL VENOUS BLD VENIPUNCTURE: CPT

## 2023-12-19 PROCEDURE — 84075 ASSAY ALKALINE PHOSPHATASE: CPT

## 2023-12-19 PROCEDURE — 2500000004 HC RX 250 GENERAL PHARMACY W/ HCPCS (ALT 636 FOR OP/ED): Performed by: NURSE PRACTITIONER

## 2023-12-19 PROCEDURE — 71046 X-RAY EXAM CHEST 2 VIEWS: CPT | Performed by: RADIOLOGY

## 2023-12-19 PROCEDURE — 73630 X-RAY EXAM OF FOOT: CPT | Mod: RT,FY

## 2023-12-19 PROCEDURE — G0378 HOSPITAL OBSERVATION PER HR: HCPCS

## 2023-12-19 PROCEDURE — 80349 CANNABINOIDS NATURAL: CPT | Performed by: NURSE PRACTITIONER

## 2023-12-19 PROCEDURE — 2500000005 HC RX 250 GENERAL PHARMACY W/O HCPCS: Performed by: NURSE PRACTITIONER

## 2023-12-19 RX ORDER — KETOROLAC TROMETHAMINE 30 MG/ML
30 INJECTION, SOLUTION INTRAMUSCULAR; INTRAVENOUS EVERY 6 HOURS SCHEDULED
Status: COMPLETED | OUTPATIENT
Start: 2023-12-19 | End: 2023-12-22

## 2023-12-19 RX ORDER — HYDROMORPHONE HYDROCHLORIDE 1 MG/ML
1 INJECTION, SOLUTION INTRAMUSCULAR; INTRAVENOUS; SUBCUTANEOUS EVERY 2 HOUR PRN
Status: DISCONTINUED | OUTPATIENT
Start: 2023-12-19 | End: 2023-12-19

## 2023-12-19 RX ORDER — POLYETHYLENE GLYCOL 3350 17 G/17G
17 POWDER, FOR SOLUTION ORAL DAILY
Status: DISCONTINUED | OUTPATIENT
Start: 2023-12-19 | End: 2023-12-24 | Stop reason: HOSPADM

## 2023-12-19 RX ORDER — ONDANSETRON HYDROCHLORIDE 2 MG/ML
4 INJECTION, SOLUTION INTRAVENOUS ONCE
Status: COMPLETED | OUTPATIENT
Start: 2023-12-19 | End: 2023-12-19

## 2023-12-19 RX ORDER — LIDOCAINE 560 MG/1
1 PATCH PERCUTANEOUS; TOPICAL; TRANSDERMAL DAILY
Status: DISCONTINUED | OUTPATIENT
Start: 2023-12-19 | End: 2023-12-24 | Stop reason: HOSPADM

## 2023-12-19 RX ORDER — ENOXAPARIN SODIUM 100 MG/ML
40 INJECTION SUBCUTANEOUS EVERY 24 HOURS
Status: DISCONTINUED | OUTPATIENT
Start: 2023-12-19 | End: 2023-12-24 | Stop reason: HOSPADM

## 2023-12-19 RX ORDER — HYDROMORPHONE HYDROCHLORIDE 1 MG/ML
1 INJECTION, SOLUTION INTRAMUSCULAR; INTRAVENOUS; SUBCUTANEOUS ONCE
Status: COMPLETED | OUTPATIENT
Start: 2023-12-19 | End: 2023-12-19

## 2023-12-19 RX ORDER — PANTOPRAZOLE SODIUM 40 MG/1
40 TABLET, DELAYED RELEASE ORAL
Status: DISCONTINUED | OUTPATIENT
Start: 2023-12-20 | End: 2023-12-24 | Stop reason: HOSPADM

## 2023-12-19 RX ORDER — HYDROMORPHONE HYDROCHLORIDE 1 MG/ML
1 INJECTION, SOLUTION INTRAMUSCULAR; INTRAVENOUS; SUBCUTANEOUS EVERY 30 MIN PRN
Status: DISCONTINUED | OUTPATIENT
Start: 2023-12-19 | End: 2023-12-19

## 2023-12-19 RX ORDER — HYDROMORPHONE HYDROCHLORIDE 1 MG/ML
2 INJECTION, SOLUTION INTRAMUSCULAR; INTRAVENOUS; SUBCUTANEOUS EVERY 2 HOUR PRN
Status: DISCONTINUED | OUTPATIENT
Start: 2023-12-19 | End: 2023-12-20

## 2023-12-19 RX ORDER — DIPHENHYDRAMINE HCL 25 MG
25 CAPSULE ORAL EVERY 6 HOURS PRN
Status: DISCONTINUED | OUTPATIENT
Start: 2023-12-19 | End: 2023-12-20

## 2023-12-19 RX ORDER — ONDANSETRON HYDROCHLORIDE 8 MG/1
8 TABLET, FILM COATED ORAL EVERY 8 HOURS PRN
Status: DISCONTINUED | OUTPATIENT
Start: 2023-12-19 | End: 2023-12-24 | Stop reason: HOSPADM

## 2023-12-19 RX ORDER — HYDROMORPHONE HYDROCHLORIDE 1 MG/ML
INJECTION, SOLUTION INTRAMUSCULAR; INTRAVENOUS; SUBCUTANEOUS
Status: COMPLETED
Start: 2023-12-19 | End: 2023-12-19

## 2023-12-19 RX ORDER — AMOXICILLIN 250 MG
2 CAPSULE ORAL 2 TIMES DAILY
Status: DISCONTINUED | OUTPATIENT
Start: 2023-12-19 | End: 2023-12-24 | Stop reason: HOSPADM

## 2023-12-19 RX ORDER — KETOROLAC TROMETHAMINE 15 MG/ML
15 INJECTION, SOLUTION INTRAMUSCULAR; INTRAVENOUS ONCE
Status: COMPLETED | OUTPATIENT
Start: 2023-12-19 | End: 2023-12-19

## 2023-12-19 RX ADMIN — HYDROMORPHONE HYDROCHLORIDE 1 MG: 1 INJECTION, SOLUTION INTRAMUSCULAR; INTRAVENOUS; SUBCUTANEOUS at 03:41

## 2023-12-19 RX ADMIN — HYDROMORPHONE HYDROCHLORIDE 2 MG: 1 INJECTION, SOLUTION INTRAMUSCULAR; INTRAVENOUS; SUBCUTANEOUS at 23:52

## 2023-12-19 RX ADMIN — SENNOSIDES AND DOCUSATE SODIUM 2 TABLET: 8.6; 5 TABLET ORAL at 08:24

## 2023-12-19 RX ADMIN — HYDROMORPHONE HYDROCHLORIDE 1 MG: 1 INJECTION, SOLUTION INTRAMUSCULAR; INTRAVENOUS; SUBCUTANEOUS at 13:10

## 2023-12-19 RX ADMIN — LIDOCAINE 1 PATCH: 4 PATCH TOPICAL at 08:23

## 2023-12-19 RX ADMIN — ONDANSETRON 4 MG: 2 INJECTION INTRAMUSCULAR; INTRAVENOUS at 02:26

## 2023-12-19 RX ADMIN — HYDROMORPHONE HYDROCHLORIDE 1 MG: 1 INJECTION, SOLUTION INTRAMUSCULAR; INTRAVENOUS; SUBCUTANEOUS at 06:50

## 2023-12-19 RX ADMIN — HYDROMORPHONE HYDROCHLORIDE 2 MG: 1 INJECTION, SOLUTION INTRAMUSCULAR; INTRAVENOUS; SUBCUTANEOUS at 21:23

## 2023-12-19 RX ADMIN — HYDROMORPHONE HYDROCHLORIDE 2 MG: 1 INJECTION, SOLUTION INTRAMUSCULAR; INTRAVENOUS; SUBCUTANEOUS at 16:51

## 2023-12-19 RX ADMIN — POLYETHYLENE GLYCOL 3350 17 G: 17 POWDER, FOR SOLUTION ORAL at 08:23

## 2023-12-19 RX ADMIN — KETOROLAC TROMETHAMINE 30 MG: 30 INJECTION, SOLUTION INTRAMUSCULAR; INTRAVENOUS at 19:43

## 2023-12-19 RX ADMIN — HYDROMORPHONE HYDROCHLORIDE 1 MG: 1 INJECTION, SOLUTION INTRAMUSCULAR; INTRAVENOUS; SUBCUTANEOUS at 10:41

## 2023-12-19 RX ADMIN — ENOXAPARIN SODIUM 40 MG: 100 INJECTION SUBCUTANEOUS at 08:24

## 2023-12-19 RX ADMIN — KETOROLAC TROMETHAMINE 15 MG: 15 INJECTION INTRAMUSCULAR; INTRAVENOUS at 02:26

## 2023-12-19 RX ADMIN — HYDROMORPHONE HYDROCHLORIDE 1 MG: 1 INJECTION, SOLUTION INTRAMUSCULAR; INTRAVENOUS; SUBCUTANEOUS at 02:26

## 2023-12-19 RX ADMIN — KETOROLAC TROMETHAMINE 30 MG: 30 INJECTION, SOLUTION INTRAMUSCULAR; INTRAVENOUS at 13:43

## 2023-12-19 RX ADMIN — HYDROMORPHONE HYDROCHLORIDE 1 MG: 1 INJECTION, SOLUTION INTRAMUSCULAR; INTRAVENOUS; SUBCUTANEOUS at 05:10

## 2023-12-19 SDOH — HEALTH STABILITY: MENTAL HEALTH: HAVE YOU WISHED YOU WERE DEAD OR WISHED YOU COULD GO TO SLEEP AND NOT WAKE UP?: NO

## 2023-12-19 SDOH — HEALTH STABILITY: MENTAL HEALTH: HAVE YOU ACTUALLY HAD ANY THOUGHTS OF KILLING YOURSELF?: NO

## 2023-12-19 SDOH — HEALTH STABILITY: MENTAL HEALTH: HAVE YOU EVER DONE ANYTHING, STARTED TO DO ANYTHING, OR PREPARED TO DO ANYTHING TO END YOUR LIFE?: NO

## 2023-12-19 SDOH — HEALTH STABILITY: MENTAL HEALTH: SUICIDE ASSESSMENT: ADULT (C-SSRS)

## 2023-12-19 ASSESSMENT — PAIN DESCRIPTION - LOCATION: LOCATION: LEG

## 2023-12-19 ASSESSMENT — LIFESTYLE VARIABLES
HAVE YOU EVER FELT YOU SHOULD CUT DOWN ON YOUR DRINKING: NO
EVER HAD A DRINK FIRST THING IN THE MORNING TO STEADY YOUR NERVES TO GET RID OF A HANGOVER: NO
REASON UNABLE TO ASSESS: YES
HAVE PEOPLE ANNOYED YOU BY CRITICIZING YOUR DRINKING: NO
EVER FELT BAD OR GUILTY ABOUT YOUR DRINKING: NO

## 2023-12-19 ASSESSMENT — PAIN SCALES - GENERAL
PAINLEVEL_OUTOF10: 3
PAINLEVEL_OUTOF10: 10 - WORST POSSIBLE PAIN

## 2023-12-19 ASSESSMENT — PAIN DESCRIPTION - ORIENTATION: ORIENTATION: LEFT

## 2023-12-19 ASSESSMENT — PAIN - FUNCTIONAL ASSESSMENT: PAIN_FUNCTIONAL_ASSESSMENT: 0-10

## 2023-12-19 NOTE — PROGRESS NOTES
"Pharmacy Medication History Review    Joellen Narayan is a 23 y.o. male admitted for Sickle-cell disease with pain (CMS/formerly [facility]). Pharmacy reviewed the patient's [id number] medications and allergies for accuracy.    The list below reflects the updated PTA list.     None        The list below reflects the updated allergy list. Please review each documented allergy for additional clarification and justification.  Allergies  Reviewed by Stephan Chung [facility] on 12/19/2023        Severity Reactions Comments    Amoxicillin Not Specified Hives     Ceftriaxone Low Hives, Rash             Patient accepts M2B at discharge.     Sources:   Pt interview  Disp Hx  OARRS - no hx since 12/2022 11/27/23 Discharge summary     Additional Comments:  Pt reports as needed naproxen and oxycodone. Oxycodone 15 mg #15/6d last filled 12/12/2022.     Stephan Chung PharmD  Transitions of Care Pharmacist    Available on Epic Chat  If no response [phone] or Vocera \"Med Rec\"   "
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
## 2023-12-20 ENCOUNTER — APPOINTMENT (OUTPATIENT)
Dept: RADIOLOGY | Facility: HOSPITAL | Age: 23
DRG: 812 | End: 2023-12-20
Payer: COMMERCIAL

## 2023-12-20 LAB
ALBUMIN SERPL BCP-MCNC: 4.1 G/DL (ref 3.4–5)
ALP SERPL-CCNC: 81 U/L (ref 33–120)
ALT SERPL W P-5'-P-CCNC: 34 U/L (ref 10–52)
ANION GAP SERPL CALC-SCNC: 13 MMOL/L (ref 10–20)
AST SERPL W P-5'-P-CCNC: 45 U/L (ref 9–39)
BASOPHILS # BLD MANUAL: 0 X10*3/UL (ref 0–0.1)
BASOPHILS NFR BLD MANUAL: 0 %
BILIRUB SERPL-MCNC: 6.4 MG/DL (ref 0–1.2)
BUN SERPL-MCNC: 8 MG/DL (ref 6–23)
CALCIUM SERPL-MCNC: 9.1 MG/DL (ref 8.6–10.6)
CHLORIDE SERPL-SCNC: 108 MMOL/L (ref 98–107)
CO2 SERPL-SCNC: 24 MMOL/L (ref 21–32)
CREAT SERPL-MCNC: 0.66 MG/DL (ref 0.5–1.3)
EOSINOPHIL # BLD MANUAL: 0.07 X10*3/UL (ref 0–0.7)
EOSINOPHIL NFR BLD MANUAL: 0.8 %
ERYTHROCYTE [DISTWIDTH] IN BLOOD BY AUTOMATED COUNT: 27.2 % (ref 11.5–14.5)
GFR SERPL CREATININE-BSD FRML MDRD: >90 ML/MIN/1.73M*2
GLUCOSE SERPL-MCNC: 81 MG/DL (ref 74–99)
HCT VFR BLD AUTO: 21.1 % (ref 41–52)
HEMOGLOBIN A2: 4.3 % (ref 2–3.5)
HEMOGLOBIN A: 8.9 % (ref 95.8–98)
HEMOGLOBIN F: 1.6 % (ref 0–2)
HEMOGLOBIN IDENTIFICATION INTERPRETATION: ABNORMAL
HEMOGLOBIN S: 85.2 %
HGB BLD-MCNC: 7.7 G/DL (ref 13.5–17.5)
HGB RETIC QN: 25 PG (ref 28–38)
HYPOCHROMIA BLD QL SMEAR: ABNORMAL
IMM GRANULOCYTES # BLD AUTO: 0.05 X10*3/UL (ref 0–0.7)
IMM GRANULOCYTES NFR BLD AUTO: 0.6 % (ref 0–0.9)
IMMATURE RETIC FRACTION: 30.4 %
LDH SERPL L TO P-CCNC: 379 U/L (ref 84–246)
LYMPHOCYTES # BLD MANUAL: 5.31 X10*3/UL (ref 1.2–4.8)
LYMPHOCYTES NFR BLD MANUAL: 64.7 %
MCH RBC QN AUTO: 26.6 PG (ref 26–34)
MCHC RBC AUTO-ENTMCNC: 36.5 G/DL (ref 32–36)
MCV RBC AUTO: 73 FL (ref 80–100)
MONOCYTES # BLD MANUAL: 0.69 X10*3/UL (ref 0.1–1)
MONOCYTES NFR BLD MANUAL: 8.4 %
NEUTS SEG # BLD MANUAL: 2.14 X10*3/UL (ref 1.2–7)
NEUTS SEG NFR BLD MANUAL: 26.1 %
NRBC BLD-RTO: 3.9 /100 WBCS (ref 0–0)
PATH REVIEW-HGB IDENTIFICATION: ABNORMAL
PLATELET # BLD AUTO: 299 X10*3/UL (ref 150–450)
POLYCHROMASIA BLD QL SMEAR: ABNORMAL
POTASSIUM SERPL-SCNC: 4.8 MMOL/L (ref 3.5–5.3)
PROT SERPL-MCNC: 6.5 G/DL (ref 6.4–8.2)
RBC # BLD AUTO: 2.89 X10*6/UL (ref 4.5–5.9)
RBC MORPH BLD: ABNORMAL
RETICS #: 0.49 X10*6/UL (ref 0.02–0.12)
RETICS/RBC NFR AUTO: 17.1 % (ref 0.5–2)
SCHISTOCYTES BLD QL SMEAR: ABNORMAL
SICKLE CELLS BLD QL SMEAR: ABNORMAL
SODIUM SERPL-SCNC: 140 MMOL/L (ref 136–145)
TARGETS BLD QL SMEAR: ABNORMAL
TOTAL CELLS COUNTED BLD: 119
WBC # BLD AUTO: 8.2 X10*3/UL (ref 4.4–11.3)

## 2023-12-20 PROCEDURE — 72197 MRI PELVIS W/O & W/DYE: CPT

## 2023-12-20 PROCEDURE — 72157 MRI CHEST SPINE W/O & W/DYE: CPT

## 2023-12-20 PROCEDURE — 85007 BL SMEAR W/DIFF WBC COUNT: CPT | Performed by: NURSE PRACTITIONER

## 2023-12-20 PROCEDURE — A9552 F18 FDG: HCPCS | Performed by: INTERNAL MEDICINE

## 2023-12-20 PROCEDURE — 2500000001 HC RX 250 WO HCPCS SELF ADMINISTERED DRUGS (ALT 637 FOR MEDICARE OP): Performed by: NURSE PRACTITIONER

## 2023-12-20 PROCEDURE — 72158 MRI LUMBAR SPINE W/O & W/DYE: CPT | Performed by: RADIOLOGY

## 2023-12-20 PROCEDURE — 85045 AUTOMATED RETICULOCYTE COUNT: CPT | Performed by: NURSE PRACTITIONER

## 2023-12-20 PROCEDURE — 72156 MRI NECK SPINE W/O & W/DYE: CPT

## 2023-12-20 PROCEDURE — 85027 COMPLETE CBC AUTOMATED: CPT | Performed by: NURSE PRACTITIONER

## 2023-12-20 PROCEDURE — 2500000005 HC RX 250 GENERAL PHARMACY W/O HCPCS: Performed by: NURSE PRACTITIONER

## 2023-12-20 PROCEDURE — 78815 PET IMAGE W/CT SKULL-THIGH: CPT | Performed by: STUDENT IN AN ORGANIZED HEALTH CARE EDUCATION/TRAINING PROGRAM

## 2023-12-20 PROCEDURE — 36415 COLL VENOUS BLD VENIPUNCTURE: CPT | Performed by: NURSE PRACTITIONER

## 2023-12-20 PROCEDURE — G0378 HOSPITAL OBSERVATION PER HR: HCPCS

## 2023-12-20 PROCEDURE — 99233 SBSQ HOSP IP/OBS HIGH 50: CPT | Performed by: NURSE PRACTITIONER

## 2023-12-20 PROCEDURE — 78815 PET IMAGE W/CT SKULL-THIGH: CPT

## 2023-12-20 PROCEDURE — 2500000004 HC RX 250 GENERAL PHARMACY W/ HCPCS (ALT 636 FOR OP/ED): Performed by: NURSE PRACTITIONER

## 2023-12-20 PROCEDURE — 3430000001 HC RX 343 DIAGNOSTIC RADIOPHARMACEUTICALS: Performed by: INTERNAL MEDICINE

## 2023-12-20 PROCEDURE — 72158 MRI LUMBAR SPINE W/O & W/DYE: CPT

## 2023-12-20 PROCEDURE — A9575 INJ GADOTERATE MEGLUMI 0.1ML: HCPCS | Performed by: INTERNAL MEDICINE

## 2023-12-20 PROCEDURE — 72157 MRI CHEST SPINE W/O & W/DYE: CPT | Performed by: RADIOLOGY

## 2023-12-20 PROCEDURE — 83615 LACTATE (LD) (LDH) ENZYME: CPT | Performed by: NURSE PRACTITIONER

## 2023-12-20 PROCEDURE — 2550000001 HC RX 255 CONTRASTS: Performed by: INTERNAL MEDICINE

## 2023-12-20 PROCEDURE — 72156 MRI NECK SPINE W/O & W/DYE: CPT | Performed by: RADIOLOGY

## 2023-12-20 PROCEDURE — 72197 MRI PELVIS W/O & W/DYE: CPT | Performed by: RADIOLOGY

## 2023-12-20 PROCEDURE — 84075 ASSAY ALKALINE PHOSPHATASE: CPT | Performed by: NURSE PRACTITIONER

## 2023-12-20 RX ORDER — FLUDEOXYGLUCOSE F 18 200 MCI/ML
11.8 INJECTION, SOLUTION INTRAVENOUS
Status: COMPLETED | OUTPATIENT
Start: 2023-12-20 | End: 2023-12-20

## 2023-12-20 RX ORDER — GADOTERATE MEGLUMINE 376.9 MG/ML
12 INJECTION INTRAVENOUS
Status: COMPLETED | OUTPATIENT
Start: 2023-12-20 | End: 2023-12-20

## 2023-12-20 RX ORDER — DIPHENHYDRAMINE HCL 25 MG
25 CAPSULE ORAL ONCE
Status: COMPLETED | OUTPATIENT
Start: 2023-12-20 | End: 2023-12-20

## 2023-12-20 RX ORDER — DIPHENHYDRAMINE HCL 50 MG
50 CAPSULE ORAL EVERY 6 HOURS PRN
Status: DISCONTINUED | OUTPATIENT
Start: 2023-12-20 | End: 2023-12-24 | Stop reason: HOSPADM

## 2023-12-20 RX ADMIN — FLUDEOXYGLUCOSE F 18 11.8 MILLICURIE: 200 INJECTION, SOLUTION INTRAVENOUS at 07:30

## 2023-12-20 RX ADMIN — DIPHENHYDRAMINE HYDROCHLORIDE 50 MG: 25 CAPSULE ORAL at 18:22

## 2023-12-20 RX ADMIN — HYDROMORPHONE HYDROCHLORIDE 2 MG: 1 INJECTION, SOLUTION INTRAMUSCULAR; INTRAVENOUS; SUBCUTANEOUS at 02:57

## 2023-12-20 RX ADMIN — HYDROMORPHONE HYDROCHLORIDE 2 MG: 1 INJECTION, SOLUTION INTRAMUSCULAR; INTRAVENOUS; SUBCUTANEOUS at 09:14

## 2023-12-20 RX ADMIN — HYDROMORPHONE HYDROCHLORIDE 3 MG: 2 INJECTION, SOLUTION INTRAMUSCULAR; INTRAVENOUS; SUBCUTANEOUS at 13:26

## 2023-12-20 RX ADMIN — HYDROMORPHONE HYDROCHLORIDE 3 MG: 2 INJECTION, SOLUTION INTRAMUSCULAR; INTRAVENOUS; SUBCUTANEOUS at 11:24

## 2023-12-20 RX ADMIN — DIPHENHYDRAMINE HYDROCHLORIDE 25 MG: 25 CAPSULE ORAL at 13:55

## 2023-12-20 RX ADMIN — HYDROMORPHONE HYDROCHLORIDE 3 MG: 2 INJECTION, SOLUTION INTRAMUSCULAR; INTRAVENOUS; SUBCUTANEOUS at 21:25

## 2023-12-20 RX ADMIN — GADOTERATE MEGLUMINE 12 ML: 376.9 INJECTION INTRAVENOUS at 17:15

## 2023-12-20 RX ADMIN — ENOXAPARIN SODIUM 40 MG: 100 INJECTION SUBCUTANEOUS at 09:14

## 2023-12-20 RX ADMIN — HYDROMORPHONE HYDROCHLORIDE 3 MG: 2 INJECTION, SOLUTION INTRAMUSCULAR; INTRAVENOUS; SUBCUTANEOUS at 18:22

## 2023-12-20 RX ADMIN — DIPHENHYDRAMINE HYDROCHLORIDE 25 MG: 25 CAPSULE ORAL at 11:33

## 2023-12-20 RX ADMIN — KETOROLAC TROMETHAMINE 30 MG: 30 INJECTION, SOLUTION INTRAMUSCULAR; INTRAVENOUS at 00:00

## 2023-12-20 RX ADMIN — HYDROMORPHONE HYDROCHLORIDE 3 MG: 2 INJECTION, SOLUTION INTRAMUSCULAR; INTRAVENOUS; SUBCUTANEOUS at 15:35

## 2023-12-20 RX ADMIN — KETOROLAC TROMETHAMINE 30 MG: 30 INJECTION, SOLUTION INTRAMUSCULAR; INTRAVENOUS at 06:00

## 2023-12-20 RX ADMIN — KETOROLAC TROMETHAMINE 30 MG: 30 INJECTION, SOLUTION INTRAMUSCULAR; INTRAVENOUS at 18:22

## 2023-12-20 RX ADMIN — HYDROMORPHONE HYDROCHLORIDE 2 MG: 1 INJECTION, SOLUTION INTRAMUSCULAR; INTRAVENOUS; SUBCUTANEOUS at 05:05

## 2023-12-20 RX ADMIN — LIDOCAINE 1 PATCH: 4 PATCH TOPICAL at 09:14

## 2023-12-20 RX ADMIN — PANTOPRAZOLE SODIUM 40 MG: 40 TABLET, DELAYED RELEASE ORAL at 09:13

## 2023-12-20 RX ADMIN — KETOROLAC TROMETHAMINE 30 MG: 30 INJECTION, SOLUTION INTRAMUSCULAR; INTRAVENOUS at 11:23

## 2023-12-20 SDOH — SOCIAL STABILITY: SOCIAL INSECURITY: ABUSE: ADULT

## 2023-12-20 SDOH — SOCIAL STABILITY: SOCIAL INSECURITY: ARE THERE ANY APPARENT SIGNS OF INJURIES/BEHAVIORS THAT COULD BE RELATED TO ABUSE/NEGLECT?: NO

## 2023-12-20 SDOH — SOCIAL STABILITY: SOCIAL INSECURITY: ARE YOU OR HAVE YOU BEEN THREATENED OR ABUSED PHYSICALLY, EMOTIONALLY, OR SEXUALLY BY ANYONE?: NO

## 2023-12-20 SDOH — SOCIAL STABILITY: SOCIAL INSECURITY: DOES ANYONE TRY TO KEEP YOU FROM HAVING/CONTACTING OTHER FRIENDS OR DOING THINGS OUTSIDE YOUR HOME?: NO

## 2023-12-20 SDOH — SOCIAL STABILITY: SOCIAL INSECURITY: DO YOU FEEL UNSAFE GOING BACK TO THE PLACE WHERE YOU ARE LIVING?: NO

## 2023-12-20 SDOH — SOCIAL STABILITY: SOCIAL INSECURITY: WERE YOU ABLE TO COMPLETE ALL THE BEHAVIORAL HEALTH SCREENINGS?: YES

## 2023-12-20 SDOH — SOCIAL STABILITY: SOCIAL INSECURITY: DO YOU FEEL ANYONE HAS EXPLOITED OR TAKEN ADVANTAGE OF YOU FINANCIALLY OR OF YOUR PERSONAL PROPERTY?: NO

## 2023-12-20 SDOH — SOCIAL STABILITY: SOCIAL INSECURITY: HAVE YOU HAD THOUGHTS OF HARMING ANYONE ELSE?: NO

## 2023-12-20 SDOH — SOCIAL STABILITY: SOCIAL INSECURITY: HAS ANYONE EVER THREATENED TO HURT YOUR FAMILY OR YOUR PETS?: NO

## 2023-12-20 ASSESSMENT — ACTIVITIES OF DAILY LIVING (ADL)
BATHING: INDEPENDENT
GROOMING: INDEPENDENT
ADEQUATE_TO_COMPLETE_ADL: YES
JUDGMENT_ADEQUATE_SAFELY_COMPLETE_DAILY_ACTIVITIES: YES
HEARING - LEFT EAR: FUNCTIONAL
HEARING - RIGHT EAR: FUNCTIONAL
DRESSING YOURSELF: INDEPENDENT
FEEDING YOURSELF: INDEPENDENT
WALKS IN HOME: INDEPENDENT
LACK_OF_TRANSPORTATION: NO
PATIENT'S MEMORY ADEQUATE TO SAFELY COMPLETE DAILY ACTIVITIES?: YES
TOILETING: INDEPENDENT

## 2023-12-20 ASSESSMENT — PATIENT HEALTH QUESTIONNAIRE - PHQ9
SUM OF ALL RESPONSES TO PHQ9 QUESTIONS 1 & 2: 0
1. LITTLE INTEREST OR PLEASURE IN DOING THINGS: NOT AT ALL
2. FEELING DOWN, DEPRESSED OR HOPELESS: NOT AT ALL
2. FEELING DOWN, DEPRESSED OR HOPELESS: NOT AT ALL
1. LITTLE INTEREST OR PLEASURE IN DOING THINGS: NOT AT ALL
SUM OF ALL RESPONSES TO PHQ9 QUESTIONS 1 & 2: 0

## 2023-12-20 ASSESSMENT — LIFESTYLE VARIABLES
HOW MANY STANDARD DRINKS CONTAINING ALCOHOL DO YOU HAVE ON A TYPICAL DAY: PATIENT DOES NOT DRINK
HOW OFTEN DO YOU HAVE A DRINK CONTAINING ALCOHOL: NEVER
HOW OFTEN DO YOU HAVE 6 OR MORE DRINKS ON ONE OCCASION: NEVER
HOW OFTEN DO YOU HAVE 6 OR MORE DRINKS ON ONE OCCASION: NEVER
AUDIT-C TOTAL SCORE: 0
AUDIT-C TOTAL SCORE: 0
SKIP TO QUESTIONS 9-10: 1
HOW MANY STANDARD DRINKS CONTAINING ALCOHOL DO YOU HAVE ON A TYPICAL DAY: PATIENT DOES NOT DRINK
SUBSTANCE_ABUSE_PAST_12_MONTHS: NO
PRESCIPTION_ABUSE_PAST_12_MONTHS: NO

## 2023-12-20 ASSESSMENT — PAIN - FUNCTIONAL ASSESSMENT
PAIN_FUNCTIONAL_ASSESSMENT: 0-10

## 2023-12-20 ASSESSMENT — PAIN SCALES - GENERAL
PAINLEVEL_OUTOF10: 8
PAINLEVEL_OUTOF10: 10 - WORST POSSIBLE PAIN
PAINLEVEL_OUTOF10: 8
PAINLEVEL_OUTOF10: 10 - WORST POSSIBLE PAIN
PAINLEVEL_OUTOF10: 10 - WORST POSSIBLE PAIN
PAINLEVEL_OUTOF10: 8
PAINLEVEL_OUTOF10: 10 - WORST POSSIBLE PAIN
PAINLEVEL_OUTOF10: 8
PAINLEVEL_OUTOF10: 3

## 2023-12-20 ASSESSMENT — COGNITIVE AND FUNCTIONAL STATUS - GENERAL
DAILY ACTIVITIY SCORE: 24
MOBILITY SCORE: 24
PATIENT BASELINE BEDBOUND: NO
DAILY ACTIVITIY SCORE: 24
MOBILITY SCORE: 24

## 2023-12-20 NOTE — PROGRESS NOTES
"Joellen Narayan is a 23 y.o. male on day 0 of admission presenting with Sickle-cell disease with pain (CMS/HCC).    Subjective   Seen this AM at the bedside. Pt continues to report pain in RLE but controlled with pain meds. We discussed negative foot Xray results and plan for PET today. He denies any fevers/chills, SOB, or CP.     Objective     Physical Exam  Vitals reviewed.   Constitutional:       Appearance: Normal appearance.   HENT:      Head: Normocephalic and atraumatic.      Nose: Nose normal.      Mouth/Throat:      Mouth: Mucous membranes are moist.      Pharynx: Oropharynx is clear.   Eyes:      Extraocular Movements: Extraocular movements intact.      Pupils: Pupils are equal, round, and reactive to light.   Cardiovascular:      Rate and Rhythm: Normal rate and regular rhythm.      Pulses: Normal pulses.      Heart sounds: Normal heart sounds.   Pulmonary:      Effort: Pulmonary effort is normal.      Breath sounds: Normal breath sounds.   Abdominal:      General: Bowel sounds are normal.      Palpations: Abdomen is soft.   Musculoskeletal:         General: Normal range of motion.   Skin:     General: Skin is warm.   Neurological:      General: No focal deficit present.      Mental Status: He is alert and oriented to person, place, and time. Mental status is at baseline.   Psychiatric:         Mood and Affect: Mood normal.         Behavior: Behavior normal.       Last Recorded Vitals  Blood pressure 136/76, pulse 70, temperature 36.8 °C (98.2 °F), temperature source Tympanic, resp. rate 18, height 1.88 m (6' 2\"), weight 63.5 kg (140 lb), SpO2 95 %.  Intake/Output last 3 Shifts:  No intake/output data recorded.    Scheduled medications  enoxaparin, 40 mg, subcutaneous, q24h  ketorolac, 30 mg, intravenous, q6h REYNALDO  lidocaine, 1 patch, transdermal, Daily  pantoprazole, 40 mg, oral, Daily before breakfast  polyethylene glycol, 17 g, oral, Daily  sennosides-docusate sodium, 2 tablet, oral, BID      Continuous " medications     PRN medications  PRN medications: diphenhydrAMINE, HYDROmorphone, ondansetron     Results for orders placed or performed during the hospital encounter of 12/19/23 (from the past 24 hour(s))   CBC and Auto Differential   Result Value Ref Range    WBC 8.2 4.4 - 11.3 x10*3/uL    nRBC 3.9 (H) 0.0 - 0.0 /100 WBCs    RBC 2.89 (L) 4.50 - 5.90 x10*6/uL    Hemoglobin 7.7 (L) 13.5 - 17.5 g/dL    Hematocrit 21.1 (L) 41.0 - 52.0 %    MCV 73 (L) 80 - 100 fL    MCH 26.6 26.0 - 34.0 pg    MCHC 36.5 (H) 32.0 - 36.0 g/dL    RDW 27.2 (H) 11.5 - 14.5 %    Platelets 299 150 - 450 x10*3/uL    Immature Granulocytes %, Automated 0.6 0.0 - 0.9 %    Immature Granulocytes Absolute, Automated 0.05 0.00 - 0.70 x10*3/uL   Comprehensive Metabolic Panel   Result Value Ref Range    Glucose 81 74 - 99 mg/dL    Sodium 140 136 - 145 mmol/L    Potassium 4.8 3.5 - 5.3 mmol/L    Chloride 108 (H) 98 - 107 mmol/L    Bicarbonate 24 21 - 32 mmol/L    Anion Gap 13 10 - 20 mmol/L    Urea Nitrogen 8 6 - 23 mg/dL    Creatinine 0.66 0.50 - 1.30 mg/dL    eGFR >90 >60 mL/min/1.73m*2    Calcium 9.1 8.6 - 10.6 mg/dL    Albumin 4.1 3.4 - 5.0 g/dL    Alkaline Phosphatase 81 33 - 120 U/L    Total Protein 6.5 6.4 - 8.2 g/dL    AST 45 (H) 9 - 39 U/L    Bilirubin, Total 6.4 (H) 0.0 - 1.2 mg/dL    ALT 34 10 - 52 U/L   Lactate Dehydrogenase   Result Value Ref Range     (H) 84 - 246 U/L   Reticulocytes   Result Value Ref Range    Retic % 17.1 (H) 0.5 - 2.0 %    Retic Absolute 0.493 (H) 0.022 - 0.118 x10*6/uL    Reticulocyte Hemoglobin 25 (L) 28 - 38 pg    Immature Retic fraction 30.4 (H) <=16.0 %   Manual Differential   Result Value Ref Range    Neutrophils %, Manual 26.1 40.0 - 80.0 %    Lymphocytes %, Manual 64.7 13.0 - 44.0 %    Monocytes %, Manual 8.4 2.0 - 10.0 %    Eosinophils %, Manual 0.8 0.0 - 6.0 %    Basophils %, Manual 0.0 0.0 - 2.0 %    Seg Neutrophils Absolute, Manual 2.14 1.20 - 7.00 x10*3/uL    Lymphocytes Absolute, Manual 5.31 (H) 1.20  - 4.80 x10*3/uL    Monocytes Absolute, Manual 0.69 0.10 - 1.00 x10*3/uL    Eosinophils Absolute, Manual 0.07 0.00 - 0.70 x10*3/uL    Basophils Absolute, Manual 0.00 0.00 - 0.10 x10*3/uL    Total Cells Counted 119     RBC Morphology See Below     Polychromasia Mild     Hypochromia Mild     RBC Fragments Few     Sickle Cells Many     Target Cells Few        Assessment/Plan   Principal Problem:    Sickle-cell disease with pain (CMS/HCC)  Active Problems:    Hodgkin lymphoma (CMS/HCC)    Joellen Narayan is a 23 y.o. male with PMH of Sickle Cell disease (HbSS c/b dactylitis, mild splenic sequestration in 2001, priapism, hx of acute chest syndrome 2/2023), newly diagnosed Hodgkin Lymphoma, LVH, and nocturnal hypoxia (intermittently in 2L NC at home) who presents to [facility] ED with complaints of right foot/RLE pain and chest pain consistent with his typical sickle cell pain. EKG without T wave changes. Hgb and lysis labs at baseline. Of note, pt with recent bx of paraaortic LN 11/30 resulting with Hodgkin Lymphoma. Discussed results with patient on 12/19. Discussed with onc, PET approved by Dr. Frias while inpatient. Plan for PET today 12/20. Started IV dilaudid for pain management. DC pending further work up and pain control.     # Hgb SS with pain  - Hx acute chest syndrome (last 2/2023) and priapism   - Doesn't follow with sickle cell clinic (misses [facility]) and not on any disease modifying medications   - Hgb baseline ~8.5, Hgb 7.7 (12/20)  - Tbili baseline fluctuates but ~5-7, Tbili 6.4 (12/20)  - LDH baseline fluctuates but ~500,  (12/20)  - OARRS reviewed no aberrancies  - No current care path   - On admit started IV dilaudid 2mg q2hrs PRN severe pain (12/19- current)  - On admit started IV Toradol 30mg q6hrs x3 days (12/19-12/22) with Protonix for PPI prophy  - Added Lidocaine patch  - CXR (12/19): negative for acute process  - X-ray 3-view R foot (12/19): no acute osseous abnormalities  - Hgb S (12/19):  85.2%  - Utox (12/19): +cannabinoid  - PO Zofran PRN for opioid-induced nausea, PO Benadryl PRN for opioid-induced pruritis  - Bowel regimen for opioid-induced constipation with DocuSenna 2tabs BID and Miralax daily      # Hodgkin Lymphoma  - Enlarged lymph nodes noted 4/1/22  - RUQ US (11/14/22) with mildly enlarged LNs in the region of the kavin hepatis  - MRI liver (11/16/22) showed re-demonstration of bulky retroperitoneal lymphadenopathy and kavin hepatic lymphadenopathy    - 11/18 lymph node biopsy showed atypical lymphoid infiltrate. Reviewed by Hemepath board, insufficient for lymphoma diagnosis  - PET/CT 12/6/22 showing retroperitoneal lymphadenopathy  - Followed up with Dr. Stoll 12/16/22 with plan for surg/onc consult for large tissue biopsy but patient missed apt and was lost to follow up  - Requested new apt with Dr. Ronnie Marte 6/19/23, patient was not seen and lost to follow up  - CT a/p (11/28) increased size of retroperitoneal lymph nodes reflecting extramedullary hematopoiesis in the setting of sickle cell versus lymphoma  - Hematology consulted rec consulting surg onc for excisional LN bx  - Surg onc consulted 11/29, rec bx with IR  - Flow cytometry (11/30): no clonal B cell or T cell population identified, lymphocyte 95%, CD3+CD4+ 68%, CD3+CD8+ 7%, CD19+ 24%  - Paraaortic LN bx (11/30) consistent with Hodgkin Lymphoma  - Bx results discussed with patient 12/19  - PET/CT (12/19): pending; approved by Dr. Frias  - Emailed Dr. Stoll 12/19  - Uric acid (12/19) 4.8; Baseline LDH ~500,  (12/20)     DVT prophy: Lovenox SQ, SCDs, encourage ambulation     DISPO:  - Full Code  - DC pending further work up and pain control  - GI FUV 12/21, Sickle Cell FUV (Dr. Cruz) 12/26, Dr. Stoll FUV (onc) requested/pending    I spent >60 minutes in the professional and overall care of this patient.    Jenies Jenkins, APRN-CNP

## 2023-12-21 ENCOUNTER — APPOINTMENT (OUTPATIENT)
Dept: GASTROENTEROLOGY | Facility: HOSPITAL | Age: 23
End: 2023-12-21
Payer: COMMERCIAL

## 2023-12-21 ENCOUNTER — TELEPHONE (OUTPATIENT)
Dept: HEMATOLOGY/ONCOLOGY | Facility: HOSPITAL | Age: 23
End: 2023-12-21
Payer: COMMERCIAL

## 2023-12-21 PROBLEM — D57.00 SICKLE CELL PAIN CRISIS (MULTI): Status: RESOLVED | Noted: 2023-12-21 | Resolved: 2023-12-21

## 2023-12-21 PROBLEM — D57.00 SICKLE CELL PAIN CRISIS (MULTI): Status: ACTIVE | Noted: 2023-12-21

## 2023-12-21 LAB
1OH-MIDAZOLAM UR CFM-MCNC: <25 NG/ML
6MAM UR CFM-MCNC: <25 NG/ML
7AMINOCLONAZEPAM UR CFM-MCNC: <25 NG/ML
A-OH ALPRAZ UR CFM-MCNC: <25 NG/ML
ALBUMIN SERPL BCP-MCNC: 4.5 G/DL (ref 3.4–5)
ALP SERPL-CCNC: 85 U/L (ref 33–120)
ALPRAZ UR CFM-MCNC: <25 NG/ML
ALT SERPL W P-5'-P-CCNC: 32 U/L (ref 10–52)
ANION GAP SERPL CALC-SCNC: 15 MMOL/L (ref 10–20)
AST SERPL W P-5'-P-CCNC: 46 U/L (ref 9–39)
BASOPHILS # BLD AUTO: 0.04 X10*3/UL (ref 0–0.1)
BASOPHILS NFR BLD AUTO: 0.4 %
BILIRUB SERPL-MCNC: 8.1 MG/DL (ref 0–1.2)
BUN SERPL-MCNC: 9 MG/DL (ref 6–23)
CALCIUM SERPL-MCNC: 9.7 MG/DL (ref 8.6–10.6)
CARBOXYTHC UR-MCNC: 440 NG/ML
CHLORDIAZEP UR CFM-MCNC: <25 NG/ML
CHLORIDE SERPL-SCNC: 103 MMOL/L (ref 98–107)
CLONAZEPAM UR CFM-MCNC: <25 NG/ML
CO2 SERPL-SCNC: 26 MMOL/L (ref 21–32)
CODEINE UR CFM-MCNC: <50 NG/ML
CREAT SERPL-MCNC: 0.76 MG/DL (ref 0.5–1.3)
DIAZEPAM UR CFM-MCNC: <25 NG/ML
EDDP UR CFM-MCNC: <25 NG/ML
EOSINOPHIL # BLD AUTO: 0.37 X10*3/UL (ref 0–0.7)
EOSINOPHIL NFR BLD AUTO: 3.3 %
ERYTHROCYTE [DISTWIDTH] IN BLOOD BY AUTOMATED COUNT: 27.9 % (ref 11.5–14.5)
FENTANYL UR CFM-MCNC: <2.5 NG/ML
GFR SERPL CREATININE-BSD FRML MDRD: >90 ML/MIN/1.73M*2
GLUCOSE SERPL-MCNC: 80 MG/DL (ref 74–99)
HCT VFR BLD AUTO: 20.6 % (ref 41–52)
HGB BLD-MCNC: 7.5 G/DL (ref 13.5–17.5)
HGB RETIC QN: 26 PG (ref 28–38)
HOWELL-JOLLY BOD BLD QL SMEAR: PRESENT
HYDROCODONE CTO UR CFM-MCNC: <25 NG/ML
HYDROMORPHONE UR CFM-MCNC: >2500 NG/ML
HYPOCHROMIA BLD QL SMEAR: NORMAL
IMM GRANULOCYTES # BLD AUTO: 0.04 X10*3/UL (ref 0–0.7)
IMM GRANULOCYTES NFR BLD AUTO: 0.4 % (ref 0–0.9)
IMMATURE RETIC FRACTION: 16.9 %
LDH SERPL L TO P-CCNC: 401 U/L (ref 84–246)
LORAZEPAM UR CFM-MCNC: <25 NG/ML
LYMPHOCYTES # BLD AUTO: 3.82 X10*3/UL (ref 1.2–4.8)
LYMPHOCYTES NFR BLD AUTO: 34.3 %
MCH RBC QN AUTO: 26.9 PG (ref 26–34)
MCHC RBC AUTO-ENTMCNC: 36.4 G/DL (ref 32–36)
MCV RBC AUTO: 74 FL (ref 80–100)
METHADONE UR CFM-MCNC: <25 NG/ML
MIDAZOLAM UR CFM-MCNC: <25 NG/ML
MONOCYTES # BLD AUTO: 1.31 X10*3/UL (ref 0.1–1)
MONOCYTES NFR BLD AUTO: 11.8 %
MORPHINE UR CFM-MCNC: <50 NG/ML
NEUTROPHILS # BLD AUTO: 5.55 X10*3/UL (ref 1.2–7.7)
NEUTROPHILS NFR BLD AUTO: 49.8 %
NORDIAZEPAM UR CFM-MCNC: <25 NG/ML
NORFENTANYL UR CFM-MCNC: <2.5 NG/ML
NORHYDROCODONE UR CFM-MCNC: <25 NG/ML
NOROXYCODONE UR CFM-MCNC: <25 NG/ML
NORTRAMADOL UR-MCNC: <50 NG/ML
NRBC BLD-RTO: 1.9 /100 WBCS (ref 0–0)
OXAZEPAM UR CFM-MCNC: <25 NG/ML
OXYCODONE UR CFM-MCNC: <25 NG/ML
OXYMORPHONE UR CFM-MCNC: <25 NG/ML
PAPPENHEIMER BOD BLD QL SMEAR: PRESENT
PLATELET # BLD AUTO: 363 X10*3/UL (ref 150–450)
POLYCHROMASIA BLD QL SMEAR: NORMAL
POTASSIUM SERPL-SCNC: 4.6 MMOL/L (ref 3.5–5.3)
PROT SERPL-MCNC: 7 G/DL (ref 6.4–8.2)
RBC # BLD AUTO: 2.79 X10*6/UL (ref 4.5–5.9)
RBC MORPH BLD: NORMAL
RETICS #: 0.58 X10*6/UL (ref 0.02–0.12)
RETICS/RBC NFR AUTO: 20.8 % (ref 0.5–2)
SCHISTOCYTES BLD QL SMEAR: NORMAL
SICKLE CELLS BLD QL SMEAR: NORMAL
SODIUM SERPL-SCNC: 139 MMOL/L (ref 136–145)
TARGETS BLD QL SMEAR: NORMAL
TEMAZEPAM UR CFM-MCNC: <25 NG/ML
TRAMADOL UR CFM-MCNC: <50 NG/ML
WBC # BLD AUTO: 11.1 X10*3/UL (ref 4.4–11.3)
ZOLPIDEM UR CFM-MCNC: <25 NG/ML
ZOLPIDEM UR-MCNC: <25 NG/ML

## 2023-12-21 PROCEDURE — 85045 AUTOMATED RETICULOCYTE COUNT: CPT

## 2023-12-21 PROCEDURE — 2500000001 HC RX 250 WO HCPCS SELF ADMINISTERED DRUGS (ALT 637 FOR MEDICARE OP): Performed by: NURSE PRACTITIONER

## 2023-12-21 PROCEDURE — 1170000001 HC PRIVATE ONCOLOGY ROOM DAILY

## 2023-12-21 PROCEDURE — 80053 COMPREHEN METABOLIC PANEL: CPT

## 2023-12-21 PROCEDURE — 99233 SBSQ HOSP IP/OBS HIGH 50: CPT | Performed by: NURSE PRACTITIONER

## 2023-12-21 PROCEDURE — 83615 LACTATE (LD) (LDH) ENZYME: CPT

## 2023-12-21 PROCEDURE — 96372 THER/PROPH/DIAG INJ SC/IM: CPT | Performed by: NURSE PRACTITIONER

## 2023-12-21 PROCEDURE — 36415 COLL VENOUS BLD VENIPUNCTURE: CPT

## 2023-12-21 PROCEDURE — 2500000004 HC RX 250 GENERAL PHARMACY W/ HCPCS (ALT 636 FOR OP/ED): Performed by: NURSE PRACTITIONER

## 2023-12-21 PROCEDURE — 2500000005 HC RX 250 GENERAL PHARMACY W/O HCPCS: Performed by: NURSE PRACTITIONER

## 2023-12-21 PROCEDURE — 2500000004 HC RX 250 GENERAL PHARMACY W/ HCPCS (ALT 636 FOR OP/ED): Performed by: STUDENT IN AN ORGANIZED HEALTH CARE EDUCATION/TRAINING PROGRAM

## 2023-12-21 PROCEDURE — 85025 COMPLETE CBC W/AUTO DIFF WBC: CPT

## 2023-12-21 RX ORDER — HYDROMORPHONE HYDROCHLORIDE 1 MG/ML
1 INJECTION, SOLUTION INTRAMUSCULAR; INTRAVENOUS; SUBCUTANEOUS ONCE
Status: COMPLETED | OUTPATIENT
Start: 2023-12-21 | End: 2023-12-21

## 2023-12-21 RX ORDER — NALOXONE HYDROCHLORIDE 0.4 MG/ML
0.2 INJECTION, SOLUTION INTRAMUSCULAR; INTRAVENOUS; SUBCUTANEOUS AS NEEDED
Status: DISCONTINUED | OUTPATIENT
Start: 2023-12-21 | End: 2023-12-24 | Stop reason: HOSPADM

## 2023-12-21 RX ORDER — HYDROMORPHONE HCL/0.9% NACL/PF 15 MG/30ML
PATIENT CONTROLLED ANALGESIA SYRINGE INTRAVENOUS CONTINUOUS
Status: DISCONTINUED | OUTPATIENT
Start: 2023-12-21 | End: 2023-12-22

## 2023-12-21 RX ORDER — DICLOFENAC SODIUM 10 MG/G
4 GEL TOPICAL EVERY 6 HOURS PRN
Status: DISCONTINUED | OUTPATIENT
Start: 2023-12-21 | End: 2023-12-24 | Stop reason: HOSPADM

## 2023-12-21 RX ORDER — ACETAMINOPHEN 325 MG/1
650 TABLET ORAL ONCE
Status: COMPLETED | OUTPATIENT
Start: 2023-12-21 | End: 2023-12-21

## 2023-12-21 RX ADMIN — KETOROLAC TROMETHAMINE 30 MG: 30 INJECTION, SOLUTION INTRAMUSCULAR; INTRAVENOUS at 06:18

## 2023-12-21 RX ADMIN — Medication: at 18:00

## 2023-12-21 RX ADMIN — KETOROLAC TROMETHAMINE 30 MG: 30 INJECTION, SOLUTION INTRAMUSCULAR; INTRAVENOUS at 18:07

## 2023-12-21 RX ADMIN — ENOXAPARIN SODIUM 40 MG: 100 INJECTION SUBCUTANEOUS at 08:21

## 2023-12-21 RX ADMIN — ACETAMINOPHEN 650 MG: 325 TABLET ORAL at 08:18

## 2023-12-21 RX ADMIN — KETOROLAC TROMETHAMINE 30 MG: 30 INJECTION, SOLUTION INTRAMUSCULAR; INTRAVENOUS at 00:13

## 2023-12-21 RX ADMIN — HYDROMORPHONE HYDROCHLORIDE 3 MG: 2 INJECTION, SOLUTION INTRAMUSCULAR; INTRAVENOUS; SUBCUTANEOUS at 08:17

## 2023-12-21 RX ADMIN — KETOROLAC TROMETHAMINE 30 MG: 30 INJECTION, SOLUTION INTRAMUSCULAR; INTRAVENOUS at 12:28

## 2023-12-21 RX ADMIN — SENNOSIDES AND DOCUSATE SODIUM 2 TABLET: 8.6; 5 TABLET ORAL at 20:20

## 2023-12-21 RX ADMIN — PANTOPRAZOLE SODIUM 40 MG: 40 TABLET, DELAYED RELEASE ORAL at 08:17

## 2023-12-21 RX ADMIN — DICLOFENAC SODIUM 1 APPLICATION: 10 GEL TOPICAL at 13:35

## 2023-12-21 RX ADMIN — LIDOCAINE 1 PATCH: 4 PATCH TOPICAL at 08:21

## 2023-12-21 RX ADMIN — HYDROMORPHONE HYDROCHLORIDE 3 MG: 2 INJECTION, SOLUTION INTRAMUSCULAR; INTRAVENOUS; SUBCUTANEOUS at 05:03

## 2023-12-21 RX ADMIN — HYDROMORPHONE HYDROCHLORIDE 3 MG: 2 INJECTION, SOLUTION INTRAMUSCULAR; INTRAVENOUS; SUBCUTANEOUS at 10:25

## 2023-12-21 RX ADMIN — HYDROMORPHONE HYDROCHLORIDE 1 MG: 1 INJECTION, SOLUTION INTRAMUSCULAR; INTRAVENOUS; SUBCUTANEOUS at 23:08

## 2023-12-21 RX ADMIN — Medication: at 11:20

## 2023-12-21 RX ADMIN — HYDROMORPHONE HYDROCHLORIDE 3 MG: 2 INJECTION, SOLUTION INTRAMUSCULAR; INTRAVENOUS; SUBCUTANEOUS at 02:25

## 2023-12-21 RX ADMIN — HYDROMORPHONE HYDROCHLORIDE 3 MG: 2 INJECTION, SOLUTION INTRAMUSCULAR; INTRAVENOUS; SUBCUTANEOUS at 00:13

## 2023-12-21 ASSESSMENT — COGNITIVE AND FUNCTIONAL STATUS - GENERAL
DAILY ACTIVITIY SCORE: 24
MOBILITY SCORE: 24
MOBILITY SCORE: 24
DAILY ACTIVITIY SCORE: 24

## 2023-12-21 ASSESSMENT — PAIN - FUNCTIONAL ASSESSMENT
PAIN_FUNCTIONAL_ASSESSMENT: 0-10

## 2023-12-21 ASSESSMENT — PAIN SCALES - GENERAL
PAINLEVEL_OUTOF10: 10 - WORST POSSIBLE PAIN
PAINLEVEL_OUTOF10: 9
PAINLEVEL_OUTOF10: 8
PAINLEVEL_OUTOF10: 10 - WORST POSSIBLE PAIN
PAINLEVEL_OUTOF10: 8
PAINLEVEL_OUTOF10: 8
PAINLEVEL_OUTOF10: 10 - WORST POSSIBLE PAIN
PAINLEVEL_OUTOF10: 7
PAINLEVEL_OUTOF10: 8
PAINLEVEL_OUTOF10: 10 - WORST POSSIBLE PAIN
PAINLEVEL_OUTOF10: 9
PAINLEVEL_OUTOF10: 9
PAINLEVEL_OUTOF10: 8
PAINLEVEL_OUTOF10: 10 - WORST POSSIBLE PAIN
PAINLEVEL_OUTOF10: 7
PAINLEVEL_OUTOF10: 9

## 2023-12-21 NOTE — CARE PLAN
Problem: Fall/Injury  Goal: Not fall by end of shift  Outcome: Progressing  Goal: Be free from injury by end of the shift  Outcome: Progressing  Goal: Verbalize understanding of personal risk factors for fall in the hospital  Outcome: Progressing  Goal: Verbalize understanding of risk factor reduction measures to prevent injury from fall in the home  Outcome: Progressing  Goal: Use assistive devices by end of the shift  Outcome: Progressing  Goal: Pace activities to prevent fatigue by end of the shift  Outcome: Progressing     Problem: Pain  Goal: Takes deep breaths with improved pain control throughout the shift  Outcome: Progressing  Goal: Turns in bed with improved pain control throughout the shift  Outcome: Progressing  Goal: Walks with improved pain control throughout the shift  Outcome: Progressing  Goal: Performs ADL's with improved pain control throughout shift  Outcome: Progressing  Goal: Participates in PT with improved pain control throughout the shift  Outcome: Progressing  Goal: Free from opioid side effects throughout the shift  Outcome: Progressing  Goal: Free from acute confusion related to pain meds throughout the shift  Outcome: Progressing   The patient's goals for the shift include  pain control    The clinical goals for the shift include patient will rate pain less than an 8/10 throughout shift 12/21 at 0700    Over the shift, the patient did make progress toward the following goals.

## 2023-12-21 NOTE — CARE PLAN
The clinical goals for the shift include patient will rate pain less than an 8/10 throughout shift 12/21 at 0700    Problem: Fall/Injury  Goal: Be free from injury by end of the shift  Outcome: Progressing     Problem: Fall/Injury  Goal: Not fall by end of shift  Outcome: Progressing     Problem: Fall/Injury  Goal: Pace activities to prevent fatigue by end of the shift  Outcome: Progressing     Problem: Fall/Injury  Goal: Verbalize understanding of personal risk factors for fall in the hospital  Outcome: Progressing

## 2023-12-21 NOTE — PROGRESS NOTES
"Joellen Narayan is a 23 y.o. male on day 0 of admission presenting with Sickle-cell disease with pain (CMS/HCC).    Subjective   Seen this AM at the bedside. Pt continues to report pain mostly in his lower extremities. We discussed the option of starting a PCA today and pt agreeable. He also reports RUE edema and tenderness. Discussed RUE duplex to r/o DVT. Discussed MRI spine/pelvis results with pt and mother at bedside. He denies any fevers/chills, SOB, or CP.    Objective     Physical Exam  Vitals reviewed.   Constitutional:       Appearance: Normal appearance.   HENT:      Head: Normocephalic and atraumatic.      Nose: Nose normal.      Mouth/Throat:      Mouth: Mucous membranes are moist.      Pharynx: Oropharynx is clear.   Eyes:      Extraocular Movements: Extraocular movements intact.      Pupils: Pupils are equal, round, and reactive to light.   Cardiovascular:      Rate and Rhythm: Normal rate and regular rhythm.      Pulses: Normal pulses.      Heart sounds: Normal heart sounds.   Pulmonary:      Effort: Pulmonary effort is normal.      Breath sounds: Normal breath sounds.   Abdominal:      General: Bowel sounds are normal.      Palpations: Abdomen is soft.   Musculoskeletal:         General: Normal range of motion.      Right upper arm: Swelling, edema and tenderness present.   Skin:     General: Skin is warm.   Neurological:      General: No focal deficit present.      Mental Status: He is alert and oriented to person, place, and time. Mental status is at baseline.   Psychiatric:         Mood and Affect: Mood normal.         Behavior: Behavior normal.       Last Recorded Vitals  Blood pressure 117/66, pulse 67, temperature 36.5 °C (97.7 °F), temperature source Temporal, resp. rate 16, height 1.88 m (6' 2\"), weight 63.5 kg (140 lb), SpO2 90 %.  Intake/Output last 3 Shifts:  No intake/output data recorded.    Scheduled medications  enoxaparin, 40 mg, subcutaneous, q24h  ketorolac, 30 mg, intravenous, q6h " REYNALDO  lidocaine, 1 patch, transdermal, Daily  pantoprazole, 40 mg, oral, Daily before breakfast  polyethylene glycol, 17 g, oral, Daily  sennosides-docusate sodium, 2 tablet, oral, BID      Continuous medications  HYDROmorphone,     PRN medications  PRN medications: diphenhydrAMINE, naloxone, ondansetron     Results for orders placed or performed during the hospital encounter of 12/19/23 (from the past 24 hour(s))   CBC and Auto Differential   Result Value Ref Range    WBC 11.1 4.4 - 11.3 x10*3/uL    nRBC 1.9 (H) 0.0 - 0.0 /100 WBCs    RBC 2.79 (L) 4.50 - 5.90 x10*6/uL    Hemoglobin 7.5 (L) 13.5 - 17.5 g/dL    Hematocrit 20.6 (L) 41.0 - 52.0 %    MCV 74 (L) 80 - 100 fL    MCH 26.9 26.0 - 34.0 pg    MCHC 36.4 (H) 32.0 - 36.0 g/dL    RDW 27.9 (H) 11.5 - 14.5 %    Platelets 363 150 - 450 x10*3/uL    Neutrophils % 49.8 40.0 - 80.0 %    Immature Granulocytes %, Automated 0.4 0.0 - 0.9 %    Lymphocytes % 34.3 13.0 - 44.0 %    Monocytes % 11.8 2.0 - 10.0 %    Eosinophils % 3.3 0.0 - 6.0 %    Basophils % 0.4 0.0 - 2.0 %    Neutrophils Absolute 5.55 1.20 - 7.70 x10*3/uL    Immature Granulocytes Absolute, Automated 0.04 0.00 - 0.70 x10*3/uL    Lymphocytes Absolute 3.82 1.20 - 4.80 x10*3/uL    Monocytes Absolute 1.31 (H) 0.10 - 1.00 x10*3/uL    Eosinophils Absolute 0.37 0.00 - 0.70 x10*3/uL    Basophils Absolute 0.04 0.00 - 0.10 x10*3/uL   Comprehensive metabolic panel   Result Value Ref Range    Glucose 80 74 - 99 mg/dL    Sodium 139 136 - 145 mmol/L    Potassium 4.6 3.5 - 5.3 mmol/L    Chloride 103 98 - 107 mmol/L    Bicarbonate 26 21 - 32 mmol/L    Anion Gap 15 10 - 20 mmol/L    Urea Nitrogen 9 6 - 23 mg/dL    Creatinine 0.76 0.50 - 1.30 mg/dL    eGFR >90 >60 mL/min/1.73m*2    Calcium 9.7 8.6 - 10.6 mg/dL    Albumin 4.5 3.4 - 5.0 g/dL    Alkaline Phosphatase 85 33 - 120 U/L    Total Protein 7.0 6.4 - 8.2 g/dL    AST 46 (H) 9 - 39 U/L    Bilirubin, Total 8.1 (H) 0.0 - 1.2 mg/dL    ALT 32 10 - 52 U/L   Lactate dehydrogenase    Result Value Ref Range     (H) 84 - 246 U/L   Reticulocytes   Result Value Ref Range    Retic % 20.8 (H) 0.5 - 2.0 %    Retic Absolute 0.580 (H) 0.022 - 0.118 x10*6/uL    Reticulocyte Hemoglobin 26 (L) 28 - 38 pg    Immature Retic fraction 16.9 (H) <=16.0 %   Morphology   Result Value Ref Range    RBC Morphology See Below     Polychromasia Mild     Hypochromia Mild     RBC Fragments Few     Sickle Cells Many     Target Cells Few     P[place] Bodies Present     Pappenheimer Bodies Present        Assessment/Plan   Principal Problem:    Sickle-cell disease with pain (CMS/HCC)  Active Problems:    Hodgkin lymphoma (CMS/HCC)    Joellen Narayan is a 23 y.o. male with PMH of Sickle Cell disease (HbSS c/b dactylitis, mild splenic sequestration in 2001, priapism, hx of acute chest syndrome 2/2023), newly diagnosed Hodgkin Lymphoma, LVH, and nocturnal hypoxia (intermittently in 2L NC at home) who presents to [facility] ED with complaints of right foot/RLE pain and chest pain consistent with his typical sickle cell pain. EKG without T wave changes. Hgb and lysis labs at baseline. Of note, pt with recent bx of paraaortic LN 11/30 resulting with Hodgkin Lymphoma. Discussed results with patient on 12/19. Discussed with onc, PET approved by Dr. Frias while inpatient. PET (12/20) showing increase in size and avidity of RP and pelvic lymphadenopathy. Interval development on multiple FDG avid focus within sternum, vertebral body, R acetabular wall and L femoral head and sternum manubrium, rec MRI spine/pelvis to lymphoma lesions. MRI C/T/L spine (12/20) slight decreased marrow signal throughout the spine consistent with chronic anemia in sickle cell disease. MRI Pelvis (12/20) T2 and FLAIR hyperintense signal of the R superior endplate of the L5 vertebral body, R superior acetabulum, and L femoral head corresponding to hypermetabolic lesions seen on PET that may represent osseous lymphomatous involvement. Started IV  dilaudid for pain management, transitioned to dilaudid PCA 12/21 d/t uncontrolled pain. DC pending improvement in pain.     # Hgb SS with pain  - Hx acute chest syndrome (last 2/2023) and priapism   - Doesn't follow with sickle cell clinic (misses [facility]) and not on any disease modifying medications   - Hgb baseline ~8.5, Hgb 7.5 (12/21)  - Tbili baseline fluctuates but ~5-7, Tbili 8.1 (12/21); likely increased 2/2 current pain crisis- will monitor closely for now  - LDH baseline fluctuates but ~500,  (12/21)  - OARRS reviewed no aberrancies  - No current care path   - s/p IV dilaudid 2mg q2hrs PRN severe pain (12/19-12/21)  - 12/21 started dilaudid PCA; 0.4mg demand, 10min lockout, 2.4mg/hr max. No RN bolus dose as this is pt's first time using PCA  - On admit started IV Toradol 30mg q6hrs x3 days (12/19-12/22) with Protonix for PPI prophy  - Added Lidocaine patch  - CXR (12/19): negative for acute process  - X-ray 3-view R foot (12/19): no acute osseous abnormalities  - Discussed MRI foot with pt as he was having persistent pain but decided not to do as pt had MRI previous day and was claustrophobic and MRI foot not likely to show anything acute  - Hgb S (12/19): 85.2%  - Utox (12/19): +cannabinoid  - PO Zofran PRN for opioid-induced nausea, PO Benadryl PRN for opioid-induced pruritis  - Bowel regimen for opioid-induced constipation with DocuSenna 2tabs BID and Miralax daily      # Hodgkin Lymphoma  - Enlarged lymph nodes noted 4/1/22  - RUQ US (11/14/22) with mildly enlarged LNs in the region of the kavin hepatis  - MRI liver (11/16/22) showed re-demonstration of bulky retroperitoneal lymphadenopathy and kavin hepatic lymphadenopathy    - 11/18 lymph node biopsy showed atypical lymphoid infiltrate. Reviewed by Hemepath board, insufficient for lymphoma diagnosis  - PET/CT 12/6/22 showing retroperitoneal lymphadenopathy  - Followed up with Dr. Stoll 12/16/22 with plan for surg/onc consult for large tissue  biopsy but patient missed apt and was lost to follow up  - Requested new apt with Dr. 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 Interval development on multiple FDG avid focus within sternum, vertebral body, R acetabular wall and L femoral head and sternum manubrium, rec MRI spine/pelvis to lymphoma lesions  - MRI C/T/L spine (12/20) slight decreased marrow signal throughout the spine consistent with chronic anemia in sickle cell disease  - MRI Pelvis (12/20) T2 and FLAIR hyperintense signal of the R superior endplate of the L5 vertebral body, R superior acetabulum, and L femoral head corresponding to hypermetabolic lesions seen on PET that may represent osseous lymphomatous involvement  - Discussed MRI spine/pelvis findings with pt/mother 12/21  - Emailed Dr. Stoll 12/19  - Uric acid (12/19) 4.8; Baseline LDH ~500,  (12/20)     DVT prophy: Lovenox SQ, SCDs, encourage ambulation     DISPO:  - Full Code  - DC pending improvement in pain.  - GI FUV 12/21, Sickle Cell FUV (Dr. Cruz) 12/26, Dr. Stoll FUV (onc) requested/pending    I spent >60 minutes in the professional and overall care of this patient.    Assessment and plan discussed with attending physician Dr. James Murguia, APRN-CNP

## 2023-12-21 NOTE — PROGRESS NOTES
Child Life Assessment:   Reason for Consult  Discipline: Child Life Specialist  Reason for Consult: Relaxation strategies, Coping skill development/planning  Referral Source: Self  Total Time Spent (min): 30 minutes    Patient Intervention(s)  Type of Intervention Performed: Healing environment interventions  Healing Environment Intervention(s): Assessment, Coping plan implementation, Coping skill development/planning, Developmental play/activities, Empathetic listening/validation of emotions, Orientation to services, Opportunity for choice and control, Pain management, Rapport building, Sensory stimulation    Evaluation  Evaluation/Plan of Care: Provide ongoing support    Session Details: Certified Child Life Specialist (CCLS) introduced child life services to patient and engaged in conversation regarding patient's coping with hospitalization and pain.  Patient shared that he typically uses the television for distraction, but was open to learning new coping skills.  CCLS provided a stress ball and then led patient through a guided imagery script. Patient closed his eyes and utilized deep breathing while following along.  Once completed, patient shared that this had been helpful for him and that he could see himself utilize guided imagery in the future.  Resource list of apps provided for relaxation and coping.  CCLS and patient engaged in discussion regarding patient's hospital experience, supports, and daily life.  Child life will continue to follow as needed throughout patient's admission.    FINESSE Stephens, CCLS  [employer] Chat

## 2023-12-22 ENCOUNTER — APPOINTMENT (OUTPATIENT)
Dept: VASCULAR MEDICINE | Facility: HOSPITAL | Age: 23
DRG: 812 | End: 2023-12-22
Payer: COMMERCIAL

## 2023-12-22 LAB
ABO GROUP (TYPE) IN BLOOD: NORMAL
ALBUMIN SERPL BCP-MCNC: 4.5 G/DL (ref 3.4–5)
ALP SERPL-CCNC: 84 U/L (ref 33–120)
ALT SERPL W P-5'-P-CCNC: 30 U/L (ref 10–52)
ANION GAP SERPL CALC-SCNC: 13 MMOL/L (ref 10–20)
ANTIBODY SCREEN: NORMAL
AST SERPL W P-5'-P-CCNC: 52 U/L (ref 9–39)
BASOPHILS # BLD MANUAL: 0 X10*3/UL (ref 0–0.1)
BASOPHILS NFR BLD MANUAL: 0 %
BILIRUB DIRECT SERPL-MCNC: 0.9 MG/DL (ref 0–0.3)
BILIRUB SERPL-MCNC: 8.3 MG/DL (ref 0–1.2)
BUN SERPL-MCNC: 8 MG/DL (ref 6–23)
CALCIUM SERPL-MCNC: 9.5 MG/DL (ref 8.6–10.6)
CHLORIDE SERPL-SCNC: 103 MMOL/L (ref 98–107)
CO2 SERPL-SCNC: 28 MMOL/L (ref 21–32)
CREAT SERPL-MCNC: 0.74 MG/DL (ref 0.5–1.3)
EOSINOPHIL # BLD MANUAL: 0.64 X10*3/UL (ref 0–0.7)
EOSINOPHIL NFR BLD MANUAL: 7.3 %
ERYTHROCYTE [DISTWIDTH] IN BLOOD BY AUTOMATED COUNT: 28.1 % (ref 11.5–14.5)
GFR SERPL CREATININE-BSD FRML MDRD: >90 ML/MIN/1.73M*2
GLUCOSE SERPL-MCNC: 58 MG/DL (ref 74–99)
HCT VFR BLD AUTO: 19.3 % (ref 41–52)
HGB BLD-MCNC: 7.1 G/DL (ref 13.5–17.5)
HGB RETIC QN: 28 PG (ref 28–38)
HOWELL-JOLLY BOD BLD QL SMEAR: PRESENT
HYPOCHROMIA BLD QL SMEAR: ABNORMAL
IMM GRANULOCYTES # BLD AUTO: 0.04 X10*3/UL (ref 0–0.7)
IMM GRANULOCYTES NFR BLD AUTO: 0.5 % (ref 0–0.9)
IMMATURE RETIC FRACTION: 15.6 %
LDH SERPL L TO P-CCNC: 456 U/L (ref 84–246)
LYMPHOCYTES # BLD MANUAL: 3.56 X10*3/UL (ref 1.2–4.8)
LYMPHOCYTES NFR BLD MANUAL: 40.9 %
MCH RBC QN AUTO: 27.1 PG (ref 26–34)
MCHC RBC AUTO-ENTMCNC: 36.8 G/DL (ref 32–36)
MCV RBC AUTO: 74 FL (ref 80–100)
MONOCYTES # BLD MANUAL: 0.71 X10*3/UL (ref 0.1–1)
MONOCYTES NFR BLD MANUAL: 8.2 %
NEUTROPHILS # BLD MANUAL: 3.79 X10*3/UL (ref 1.2–7.7)
NEUTS BAND # BLD MANUAL: 0.08 X10*3/UL (ref 0–0.7)
NEUTS BAND NFR BLD MANUAL: 0.9 %
NEUTS SEG # BLD MANUAL: 3.71 X10*3/UL (ref 1.2–7)
NEUTS SEG NFR BLD MANUAL: 42.7 %
NRBC BLD MANUAL-RTO: 4.5 % (ref 0–0)
NRBC BLD-RTO: 2.4 /100 WBCS (ref 0–0)
PAPPENHEIMER BOD BLD QL SMEAR: PRESENT
PLATELET # BLD AUTO: 322 X10*3/UL (ref 150–450)
POLYCHROMASIA BLD QL SMEAR: ABNORMAL
POTASSIUM SERPL-SCNC: 4.7 MMOL/L (ref 3.5–5.3)
PROT SERPL-MCNC: 6.6 G/DL (ref 6.4–8.2)
RBC # BLD AUTO: 2.62 X10*6/UL (ref 4.5–5.9)
RBC MORPH BLD: ABNORMAL
RETICS #: 0.53 X10*6/UL (ref 0.02–0.12)
RETICS/RBC NFR AUTO: 20.2 % (ref 0.5–2)
RH FACTOR (ANTIGEN D): NORMAL
SCHISTOCYTES BLD QL SMEAR: ABNORMAL
SICKLE CELLS BLD QL SMEAR: ABNORMAL
SODIUM SERPL-SCNC: 139 MMOL/L (ref 136–145)
TARGETS BLD QL SMEAR: ABNORMAL
TOTAL CELLS COUNTED BLD: 110
WBC # BLD AUTO: 8.7 X10*3/UL (ref 4.4–11.3)

## 2023-12-22 PROCEDURE — 86901 BLOOD TYPING SEROLOGIC RH(D): CPT | Performed by: NURSE PRACTITIONER

## 2023-12-22 PROCEDURE — 2500000004 HC RX 250 GENERAL PHARMACY W/ HCPCS (ALT 636 FOR OP/ED)

## 2023-12-22 PROCEDURE — 85027 COMPLETE CBC AUTOMATED: CPT

## 2023-12-22 PROCEDURE — 2500000004 HC RX 250 GENERAL PHARMACY W/ HCPCS (ALT 636 FOR OP/ED): Performed by: NURSE PRACTITIONER

## 2023-12-22 PROCEDURE — 85007 BL SMEAR W/DIFF WBC COUNT: CPT

## 2023-12-22 PROCEDURE — 2500000001 HC RX 250 WO HCPCS SELF ADMINISTERED DRUGS (ALT 637 FOR MEDICARE OP): Performed by: NURSE PRACTITIONER

## 2023-12-22 PROCEDURE — 96372 THER/PROPH/DIAG INJ SC/IM: CPT | Performed by: NURSE PRACTITIONER

## 2023-12-22 PROCEDURE — 85045 AUTOMATED RETICULOCYTE COUNT: CPT

## 2023-12-22 PROCEDURE — 2500000005 HC RX 250 GENERAL PHARMACY W/O HCPCS: Performed by: NURSE PRACTITIONER

## 2023-12-22 PROCEDURE — 93971 EXTREMITY STUDY: CPT

## 2023-12-22 PROCEDURE — 36415 COLL VENOUS BLD VENIPUNCTURE: CPT

## 2023-12-22 PROCEDURE — 83615 LACTATE (LD) (LDH) ENZYME: CPT

## 2023-12-22 PROCEDURE — 93971 EXTREMITY STUDY: CPT | Performed by: INTERNAL MEDICINE

## 2023-12-22 PROCEDURE — 82248 BILIRUBIN DIRECT: CPT

## 2023-12-22 PROCEDURE — 80053 COMPREHEN METABOLIC PANEL: CPT

## 2023-12-22 PROCEDURE — 1170000001 HC PRIVATE ONCOLOGY ROOM DAILY

## 2023-12-22 PROCEDURE — 99233 SBSQ HOSP IP/OBS HIGH 50: CPT

## 2023-12-22 PROCEDURE — 76937 US GUIDE VASCULAR ACCESS: CPT

## 2023-12-22 RX ORDER — HYDROMORPHONE HYDROCHLORIDE 1 MG/ML
2 INJECTION, SOLUTION INTRAMUSCULAR; INTRAVENOUS; SUBCUTANEOUS EVERY 2 HOUR PRN
Status: DISCONTINUED | OUTPATIENT
Start: 2023-12-22 | End: 2023-12-24 | Stop reason: HOSPADM

## 2023-12-22 RX ORDER — ACETAMINOPHEN 500 MG
5 TABLET ORAL NIGHTLY PRN
Status: DISCONTINUED | OUTPATIENT
Start: 2023-12-22 | End: 2023-12-24 | Stop reason: HOSPADM

## 2023-12-22 RX ORDER — ACETAMINOPHEN 325 MG/1
650 TABLET ORAL EVERY 6 HOURS PRN
Status: DISCONTINUED | OUTPATIENT
Start: 2023-12-22 | End: 2023-12-24 | Stop reason: HOSPADM

## 2023-12-22 RX ORDER — HYDROMORPHONE HYDROCHLORIDE 1 MG/ML
2 INJECTION, SOLUTION INTRAMUSCULAR; INTRAVENOUS; SUBCUTANEOUS
Status: DISCONTINUED | OUTPATIENT
Start: 2023-12-22 | End: 2023-12-22

## 2023-12-22 RX ORDER — HYDROMORPHONE HYDROCHLORIDE 1 MG/ML
1 INJECTION, SOLUTION INTRAMUSCULAR; INTRAVENOUS; SUBCUTANEOUS ONCE
Status: COMPLETED | OUTPATIENT
Start: 2023-12-22 | End: 2023-12-22

## 2023-12-22 RX ADMIN — PANTOPRAZOLE SODIUM 40 MG: 40 TABLET, DELAYED RELEASE ORAL at 08:44

## 2023-12-22 RX ADMIN — ENOXAPARIN SODIUM 40 MG: 100 INJECTION SUBCUTANEOUS at 08:44

## 2023-12-22 RX ADMIN — HYDROMORPHONE HYDROCHLORIDE 1 MG: 1 INJECTION, SOLUTION INTRAMUSCULAR; INTRAVENOUS; SUBCUTANEOUS at 20:09

## 2023-12-22 RX ADMIN — DIPHENHYDRAMINE HYDROCHLORIDE 50 MG: 25 CAPSULE ORAL at 04:36

## 2023-12-22 RX ADMIN — KETOROLAC TROMETHAMINE 30 MG: 30 INJECTION, SOLUTION INTRAMUSCULAR; INTRAVENOUS at 00:07

## 2023-12-22 RX ADMIN — SENNOSIDES AND DOCUSATE SODIUM 2 TABLET: 8.6; 5 TABLET ORAL at 20:10

## 2023-12-22 RX ADMIN — HYDROMORPHONE HYDROCHLORIDE 2 MG: 1 INJECTION, SOLUTION INTRAMUSCULAR; INTRAVENOUS; SUBCUTANEOUS at 21:22

## 2023-12-22 RX ADMIN — ONDANSETRON HYDROCHLORIDE 8 MG: 8 TABLET, FILM COATED ORAL at 00:06

## 2023-12-22 RX ADMIN — Medication: at 04:07

## 2023-12-22 RX ADMIN — KETOROLAC TROMETHAMINE 30 MG: 30 INJECTION, SOLUTION INTRAMUSCULAR; INTRAVENOUS at 05:32

## 2023-12-22 RX ADMIN — Medication: at 13:48

## 2023-12-22 RX ADMIN — LIDOCAINE 1 PATCH: 4 PATCH TOPICAL at 08:44

## 2023-12-22 RX ADMIN — ONDANSETRON HYDROCHLORIDE 8 MG: 8 TABLET, FILM COATED ORAL at 15:45

## 2023-12-22 ASSESSMENT — COGNITIVE AND FUNCTIONAL STATUS - GENERAL
DAILY ACTIVITIY SCORE: 24
MOBILITY SCORE: 24
DAILY ACTIVITIY SCORE: 24
MOBILITY SCORE: 24

## 2023-12-22 ASSESSMENT — PAIN - FUNCTIONAL ASSESSMENT
PAIN_FUNCTIONAL_ASSESSMENT: 0-10

## 2023-12-22 ASSESSMENT — PAIN SCALES - GENERAL
PAINLEVEL_OUTOF10: 6
PAINLEVEL_OUTOF10: 7
PAINLEVEL_OUTOF10: 9
PAINLEVEL_OUTOF10: 10 - WORST POSSIBLE PAIN
PAINLEVEL_OUTOF10: 9
PAINLEVEL_OUTOF10: 9
PAINLEVEL_OUTOF10: 7

## 2023-12-22 NOTE — CARE PLAN
The clinical goals for the shift include patient will rate pain less than an 8/10 throughout shift 12/22 at 0700      Problem: Fall/Injury  Goal: Not fall by end of shift  Outcome: Progressing     Problem: Pain  Goal: Takes deep breaths with improved pain control throughout the shift  Outcome: Progressing     Problem: Pain  Goal: Walks with improved pain control throughout the shift  Outcome: Progressing     Problem: Pain  Goal: Performs ADL's with improved pain control throughout shift  Outcome: Progressing     Problem: Pain  Goal: Free from acute confusion related to pain meds throughout the shift  Outcome: Progressing     Problem: Pain  Goal: Free from opioid side effects throughout the shift  Outcome: Progressing

## 2023-12-22 NOTE — CARE PLAN
Problem: Fall/Injury  Goal: Not fall by end of shift  Outcome: Progressing  Goal: Be free from injury by end of the shift  Outcome: Progressing  Goal: Verbalize understanding of personal risk factors for fall in the hospital  Outcome: Progressing  Goal: Verbalize understanding of risk factor reduction measures to prevent injury from fall in the home  Outcome: Progressing  Goal: Use assistive devices by end of the shift  Outcome: Progressing  Goal: Pace activities to prevent fatigue by end of the shift  Outcome: Progressing     Problem: Pain  Goal: Takes deep breaths with improved pain control throughout the shift  Outcome: Progressing  Goal: Turns in bed with improved pain control throughout the shift  Outcome: Progressing  Goal: Walks with improved pain control throughout the shift  Outcome: Progressing  Goal: Performs ADL's with improved pain control throughout shift  Outcome: Progressing  Goal: Participates in PT with improved pain control throughout the shift  Outcome: Progressing  Goal: Free from opioid side effects throughout the shift  Outcome: Progressing  Goal: Free from acute confusion related to pain meds throughout the shift  Outcome: Progressing   The patient's goals for the shift include pain control     The clinical goals for the shift include patient will rate pain less than an 8/10 throughout shift 12/22 at 0700    Over the shift, the patient did  make progress toward the following goals.    Normal vision: sees adequately in most situations; can see medication labels, newsprint

## 2023-12-22 NOTE — PROGRESS NOTES
"Joellen Narayan is a 23 y.o. male on day 1 of admission presenting with Sickle-cell disease with pain (CMS/HCC).    Subjective   Patient seen resting in bed with mom at bedside during time of interview. Notes some improvement in pain today and currently rates it as a 6/10. We discussed keeping PCA pump today. Patient also reporting continued right foot pain, worse with ambulation. Describes the pain as \"stabbing\" and pain radiates up toward his ankle when he walks. Also reports difficulties sleeping for the past two nights, we discussed trialing melatonin this PM. Patient has noticed a decreased in right arm extremity edema, we discussed that US is still pending to rule out any DVT. Patient agreeable to plan. Patient is eating and drinking well, having bowel movements. He denies any shortness of breath, chest pain, abdominal pain, N/V/D, F/C, H/A.        Objective     Physical Exam  Constitutional:       Appearance: Normal appearance.   HENT:      Head: Normocephalic.      Nose: Nose normal.      Mouth/Throat:      Mouth: Mucous membranes are dry.   Eyes:      General: Scleral icterus present.   Cardiovascular:      Rate and Rhythm: Normal rate and regular rhythm.   Pulmonary:      Effort: Pulmonary effort is normal.      Breath sounds: Normal breath sounds.   Abdominal:      General: Abdomen is flat.      Palpations: Abdomen is soft.   Musculoskeletal:         General: Normal range of motion.      Cervical back: Normal range of motion.      Comments: Tenderness of right foot. Trace edema of right upper extremity    Skin:     General: Skin is warm.   Neurological:      General: No focal deficit present.      Mental Status: He is alert.   Psychiatric:         Mood and Affect: Mood normal.         Last Recorded Vitals  Blood pressure 134/65, pulse 85, temperature 36.5 °C (97.7 °F), resp. rate 16, height 1.88 m (6' 2\"), weight 63.5 kg (140 lb), SpO2 90 %.  Intake/Output last 3 Shifts:  No intake/output data " recorded.    Relevant Results                Scheduled medications  enoxaparin, 40 mg, subcutaneous, q24h  lidocaine, 1 patch, transdermal, Daily  pantoprazole, 40 mg, oral, Daily before breakfast  polyethylene glycol, 17 g, oral, Daily  sennosides-docusate sodium, 2 tablet, oral, BID      Continuous medications  HYDROmorphone,       PRN medications  PRN medications: diclofenac sodium, diphenhydrAMINE, melatonin, naloxone, ondansetron         Assessment/Plan   Principal Problem:    Sickle-cell disease with pain (CMS/HCC)  Active Problems:    Hodgkin lymphoma (CMS/HCC)    Joellen Narayan is a 23 y.o. male with PMH of Sickle Cell disease (HbSS c/b dactylitis, mild splenic sequestration in 2001, priapism, hx of acute chest syndrome 2/2023), newly diagnosed Hodgkin Lymphoma, LVH, and nocturnal hypoxia (intermittently in 2L NC at home) who presents to [facility] ED with complaints of right foot/RLE pain and chest pain consistent with his typical sickle cell pain. EKG without T wave changes. Hgb and lysis labs at baseline. Of note, pt with recent bx of paraaortic LN 11/30 resulting with Hodgkin Lymphoma. Discussed results with patient on 12/19. Discussed with onc, PET approved by Dr. Frias while inpatient. PET (12/20) showing increase in size and avidity of RP and pelvic lymphadenopathy. Interval development on multiple FDG avid focus within sternum, vertebral body, R acetabular wall and L femoral head and sternum manubrium, rec MRI spine/pelvis to lymphoma lesions. MRI C/T/L spine (12/20) slight decreased marrow signal throughout the spine consistent with chronic anemia in sickle cell disease. MRI Pelvis (12/20) T2 and FLAIR hyperintense signal of the R superior endplate of the L5 vertebral body, R superior acetabulum, and L femoral head corresponding to hypermetabolic lesions seen on PET that may represent osseous lymphomatous involvement. Started IV dilaudid for pain management, transitioned to dilaudid PCA 12/21 d/t  uncontrolled pain. LFTs and Tbili up trending. RUE US pending for right upper extremity edema. DC pending improvement in pain.     # Hgb SS with pain  - Hx acute chest syndrome (last 2/2023) and priapism   - Doesn't follow with sickle cell clinic (misses [facility]) and not on any disease modifying medications   - Hgb baseline ~8.5, Hgb 7.1 (12/22)  - Tbili baseline fluctuates but ~5-7, Tbili 8.3 (12/22); likely increased 2/2 current pain crisis- will monitor closely for now  - Direct bilirubin ordered and pending (12/22)  - LDH baseline fluctuates but ~500,  (12/22)  - OARRS reviewed no aberrancies  - No current care path   - s/p IV dilaudid 2mg q2hrs PRN severe pain (12/19-12/21)  - Continue dilaudid PCA; 0.4mg demand, 10min lockout, 2.4mg/hr max. No RN bolus dose as this is pt's first time using PCA (12/21-current)  - Continue IV Toradol 30mg q6hrs x3 days (12/19-12/22) with Protonix for PPI prophy  - Added Lidocaine patch  - CXR (12/19): negative for acute process  - X-ray 3-view R foot (12/19): no acute osseous abnormalities  - Discussed MRI foot with pt as he was having persistent pain but decided not to do as pt had MRI previous day and was claustrophobic and MRI foot not likely to show anything acute  - Hgb S (12/19): 85.2%  - Utox (12/19): +cannabinoid  - PO Zofran PRN for opioid-induced nausea, PO Benadryl PRN for opioid-induced pruritis  - Bowel regimen for opioid-induced constipation with DocuSenna 2tabs BID and Miralax daily      # Hodgkin Lymphoma  - Enlarged lymph nodes noted 4/1/22  - RUQ US (11/14/22) with mildly enlarged LNs in the region of the kavin hepatis  - MRI liver (11/16/22) showed re-demonstration of bulky retroperitoneal lymphadenopathy and kavin hepatic lymphadenopathy    - 11/18 lymph node biopsy showed atypical lymphoid infiltrate. Reviewed by Hemepath board, insufficient for lymphoma diagnosis  - PET/CT 12/6/22 showing retroperitoneal lymphadenopathy  - Followed up with Dr. Stoll  12/16/22 with plan for surg/onc consult for large tissue biopsy but patient missed apt and was lost to follow up  - Requested new apt with Dr. 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 Interval development on multiple FDG avid focus within sternum, vertebral body, R acetabular wall and L femoral head and sternum manubrium, rec MRI spine/pelvis to lymphoma lesions  - MRI C/T/L spine (12/20) slight decreased marrow signal throughout the spine consistent with chronic anemia in sickle cell disease  - MRI Pelvis (12/20) T2 and FLAIR hyperintense signal of the R superior endplate of the L5 vertebral body, R superior acetabulum, and L femoral head corresponding to hypermetabolic lesions seen on PET that may represent osseous lymphomatous involvement  - Discussed MRI spine/pelvis findings with pt/mother 12/21  - Emailed Dr. Stoll 12/19, follow-up visit scheduled for 12/26   - Uric acid (12/19) 4.8; Baseline LDH ~500,  (12/20)     DVT prophy: Lovenox SQ, SCDs, encourage ambulation    #Misc  - 12/22 start melatonin 5mg nightly for insomnia      DISPO:  - Full Code  - DC pending improvement in pain   - GI FUV 12/21, Sickle Cell FUV (Dr. Cruz) 12/26, Dr. Stoll FUV (onc) 12/26         I spent 60 minutes in the professional and overall care of this patient.    Assessment and plan as above discussed with attending physician,  Dr. James Alva, PAKenzieC

## 2023-12-22 NOTE — PROGRESS NOTES
Spiritual Care Visit    Clinical Encounter Type  Visited With: Patient  Routine Visit: Follow-up  Continue Visiting: Yes    Taxonomy  Intended Effects: Build relationship of care and support, Demonstrate caring and concern  Methods: Encourage self reflection, Encourage sharing of feelings, Offer support  Interventions: Acknowledge current situation, Acknowledge response to difficult experience, Active listening, Discuss concerns, Discuss coping mechanisms with someone     reintroduced self and role to patient Joellen Narayan. Patient shared he received a new diagnosis of lymphoma and has not been able to sleep since receiving it.  held space for patient to share his concerns, including what the treatment will be and how he will respond to it. Patient identified the support he has, especially his mother and a friend from work, and the practices of prayer and meditation he is leaning on. Patient shared he has been reconnecting with his natacha and has found community, purpose, and encouragement.  provided support through reflective listening, validation of feelings, affirmation of natacha expressions, and supportive conversation. Patient was appreciative of visit. Spiritual Care remains available as needed/requested.    Rev. Diana Álvarez MDiv, BCC

## 2023-12-23 ENCOUNTER — APPOINTMENT (OUTPATIENT)
Dept: RADIOLOGY | Facility: HOSPITAL | Age: 23
DRG: 812 | End: 2023-12-23
Payer: COMMERCIAL

## 2023-12-23 LAB
ALBUMIN SERPL BCP-MCNC: 4.5 G/DL (ref 3.4–5)
ALP SERPL-CCNC: 93 U/L (ref 33–120)
ALT SERPL W P-5'-P-CCNC: 36 U/L (ref 10–52)
ANION GAP SERPL CALC-SCNC: 14 MMOL/L (ref 10–20)
AST SERPL W P-5'-P-CCNC: 58 U/L (ref 9–39)
BASOPHILS # BLD MANUAL: 0.1 X10*3/UL (ref 0–0.1)
BASOPHILS NFR BLD MANUAL: 0.9 %
BILIRUB SERPL-MCNC: 10.7 MG/DL (ref 0–1.2)
BUN SERPL-MCNC: 8 MG/DL (ref 6–23)
CALCIUM SERPL-MCNC: 9.7 MG/DL (ref 8.6–10.6)
CHLORIDE SERPL-SCNC: 100 MMOL/L (ref 98–107)
CO2 SERPL-SCNC: 28 MMOL/L (ref 21–32)
CREAT SERPL-MCNC: 0.7 MG/DL (ref 0.5–1.3)
EOSINOPHIL # BLD MANUAL: 0.29 X10*3/UL (ref 0–0.7)
EOSINOPHIL NFR BLD MANUAL: 2.6 %
ERYTHROCYTE [DISTWIDTH] IN BLOOD BY AUTOMATED COUNT: 29.4 % (ref 11.5–14.5)
GFR SERPL CREATININE-BSD FRML MDRD: >90 ML/MIN/1.73M*2
GLUCOSE SERPL-MCNC: 77 MG/DL (ref 74–99)
HCT VFR BLD AUTO: 19.3 % (ref 41–52)
HGB BLD-MCNC: 7.3 G/DL (ref 13.5–17.5)
HGB RETIC QN: 25 PG (ref 28–38)
HYPOCHROMIA BLD QL SMEAR: ABNORMAL
IMM GRANULOCYTES # BLD AUTO: 0.1 X10*3/UL (ref 0–0.7)
IMM GRANULOCYTES NFR BLD AUTO: 0.9 % (ref 0–0.9)
IMMATURE RETIC FRACTION: 20 %
LDH SERPL L TO P-CCNC: 505 U/L (ref 84–246)
LYMPHOCYTES # BLD MANUAL: 3.63 X10*3/UL (ref 1.2–4.8)
LYMPHOCYTES NFR BLD MANUAL: 32.7 %
MCH RBC QN AUTO: 27.8 PG (ref 26–34)
MCHC RBC AUTO-ENTMCNC: 37.8 G/DL (ref 32–36)
MCV RBC AUTO: 73 FL (ref 80–100)
MONOCYTES # BLD MANUAL: 1.05 X10*3/UL (ref 0.1–1)
MONOCYTES NFR BLD MANUAL: 9.5 %
NEUTS SEG # BLD MANUAL: 6.03 X10*3/UL (ref 1.2–7)
NEUTS SEG NFR BLD MANUAL: 54.3 %
NRBC BLD-RTO: 2.9 /100 WBCS (ref 0–0)
PLATELET # BLD AUTO: 280 X10*3/UL (ref 150–450)
POLYCHROMASIA BLD QL SMEAR: ABNORMAL
POTASSIUM SERPL-SCNC: 4.7 MMOL/L (ref 3.5–5.3)
PROT SERPL-MCNC: 6.8 G/DL (ref 6.4–8.2)
RBC # BLD AUTO: 2.63 X10*6/UL (ref 4.5–5.9)
RBC MORPH BLD: ABNORMAL
RETICS #: 0.64 X10*6/UL (ref 0.02–0.12)
RETICS/RBC NFR AUTO: 24.2 % (ref 0.5–2)
SCHISTOCYTES BLD QL SMEAR: ABNORMAL
SICKLE CELLS BLD QL SMEAR: ABNORMAL
SODIUM SERPL-SCNC: 137 MMOL/L (ref 136–145)
TARGETS BLD QL SMEAR: ABNORMAL
TOTAL CELLS COUNTED BLD: 116
WBC # BLD AUTO: 11.1 X10*3/UL (ref 4.4–11.3)

## 2023-12-23 PROCEDURE — 76705 ECHO EXAM OF ABDOMEN: CPT

## 2023-12-23 PROCEDURE — 1170000001 HC PRIVATE ONCOLOGY ROOM DAILY

## 2023-12-23 PROCEDURE — 36415 COLL VENOUS BLD VENIPUNCTURE: CPT

## 2023-12-23 PROCEDURE — 99233 SBSQ HOSP IP/OBS HIGH 50: CPT | Performed by: PHYSICIAN ASSISTANT

## 2023-12-23 PROCEDURE — 2500000005 HC RX 250 GENERAL PHARMACY W/O HCPCS: Performed by: NURSE PRACTITIONER

## 2023-12-23 PROCEDURE — 85007 BL SMEAR W/DIFF WBC COUNT: CPT

## 2023-12-23 PROCEDURE — 85045 AUTOMATED RETICULOCYTE COUNT: CPT

## 2023-12-23 PROCEDURE — 2500000005 HC RX 250 GENERAL PHARMACY W/O HCPCS: Performed by: PHYSICIAN ASSISTANT

## 2023-12-23 PROCEDURE — 71045 X-RAY EXAM CHEST 1 VIEW: CPT | Performed by: RADIOLOGY

## 2023-12-23 PROCEDURE — 2500000001 HC RX 250 WO HCPCS SELF ADMINISTERED DRUGS (ALT 637 FOR MEDICARE OP): Performed by: NURSE PRACTITIONER

## 2023-12-23 PROCEDURE — 85027 COMPLETE CBC AUTOMATED: CPT

## 2023-12-23 PROCEDURE — 76705 ECHO EXAM OF ABDOMEN: CPT | Performed by: RADIOLOGY

## 2023-12-23 PROCEDURE — 71045 X-RAY EXAM CHEST 1 VIEW: CPT

## 2023-12-23 PROCEDURE — 80053 COMPREHEN METABOLIC PANEL: CPT

## 2023-12-23 PROCEDURE — 2500000004 HC RX 250 GENERAL PHARMACY W/ HCPCS (ALT 636 FOR OP/ED): Performed by: NURSE PRACTITIONER

## 2023-12-23 PROCEDURE — 2500000001 HC RX 250 WO HCPCS SELF ADMINISTERED DRUGS (ALT 637 FOR MEDICARE OP): Performed by: PHYSICIAN ASSISTANT

## 2023-12-23 PROCEDURE — 83615 LACTATE (LD) (LDH) ENZYME: CPT

## 2023-12-23 PROCEDURE — 2500000004 HC RX 250 GENERAL PHARMACY W/ HCPCS (ALT 636 FOR OP/ED)

## 2023-12-23 RX ADMIN — Medication 2 L/MIN: at 18:02

## 2023-12-23 RX ADMIN — HYDROMORPHONE HYDROCHLORIDE 2 MG: 1 INJECTION, SOLUTION INTRAMUSCULAR; INTRAVENOUS; SUBCUTANEOUS at 17:55

## 2023-12-23 RX ADMIN — PANTOPRAZOLE SODIUM 40 MG: 40 TABLET, DELAYED RELEASE ORAL at 08:44

## 2023-12-23 RX ADMIN — HYDROMORPHONE HYDROCHLORIDE 2 MG: 1 INJECTION, SOLUTION INTRAMUSCULAR; INTRAVENOUS; SUBCUTANEOUS at 04:33

## 2023-12-23 RX ADMIN — HYDROMORPHONE HYDROCHLORIDE 2 MG: 1 INJECTION, SOLUTION INTRAMUSCULAR; INTRAVENOUS; SUBCUTANEOUS at 00:07

## 2023-12-23 RX ADMIN — HYDROMORPHONE HYDROCHLORIDE 2 MG: 1 INJECTION, SOLUTION INTRAMUSCULAR; INTRAVENOUS; SUBCUTANEOUS at 19:55

## 2023-12-23 RX ADMIN — HYDROMORPHONE HYDROCHLORIDE 2 MG: 1 INJECTION, SOLUTION INTRAMUSCULAR; INTRAVENOUS; SUBCUTANEOUS at 08:50

## 2023-12-23 RX ADMIN — LIDOCAINE 1 PATCH: 4 PATCH TOPICAL at 08:44

## 2023-12-23 RX ADMIN — ACETAMINOPHEN 650 MG: 325 TABLET ORAL at 03:35

## 2023-12-23 RX ADMIN — Medication 2 L/MIN: at 11:00

## 2023-12-23 RX ADMIN — HYDROMORPHONE HYDROCHLORIDE 2 MG: 1 INJECTION, SOLUTION INTRAMUSCULAR; INTRAVENOUS; SUBCUTANEOUS at 06:39

## 2023-12-23 RX ADMIN — HYDROMORPHONE HYDROCHLORIDE 2 MG: 1 INJECTION, SOLUTION INTRAMUSCULAR; INTRAVENOUS; SUBCUTANEOUS at 12:25

## 2023-12-23 RX ADMIN — HYDROMORPHONE HYDROCHLORIDE 2 MG: 1 INJECTION, SOLUTION INTRAMUSCULAR; INTRAVENOUS; SUBCUTANEOUS at 14:30

## 2023-12-23 RX ADMIN — SENNOSIDES AND DOCUSATE SODIUM 2 TABLET: 8.6; 5 TABLET ORAL at 22:44

## 2023-12-23 RX ADMIN — HYDROMORPHONE HYDROCHLORIDE 2 MG: 1 INJECTION, SOLUTION INTRAMUSCULAR; INTRAVENOUS; SUBCUTANEOUS at 22:44

## 2023-12-23 RX ADMIN — HYDROMORPHONE HYDROCHLORIDE 2 MG: 1 INJECTION, SOLUTION INTRAMUSCULAR; INTRAVENOUS; SUBCUTANEOUS at 02:15

## 2023-12-23 RX ADMIN — ACETAMINOPHEN, CAFFEINE 2 TABLET: 500; 65 TABLET, FILM COATED ORAL at 08:43

## 2023-12-23 ASSESSMENT — COGNITIVE AND FUNCTIONAL STATUS - GENERAL
MOBILITY SCORE: 24
MOBILITY SCORE: 24
DAILY ACTIVITIY SCORE: 24
DAILY ACTIVITIY SCORE: 24

## 2023-12-23 ASSESSMENT — PAIN - FUNCTIONAL ASSESSMENT
PAIN_FUNCTIONAL_ASSESSMENT: 0-10

## 2023-12-23 ASSESSMENT — PAIN SCALES - GENERAL
PAINLEVEL_OUTOF10: 5 - MODERATE PAIN
PAINLEVEL_OUTOF10: 9
PAINLEVEL_OUTOF10: 9
PAINLEVEL_OUTOF10: 10 - WORST POSSIBLE PAIN
PAINLEVEL_OUTOF10: 9
PAINLEVEL_OUTOF10: 5 - MODERATE PAIN
PAINLEVEL_OUTOF10: 9
PAINLEVEL_OUTOF10: 6
PAINLEVEL_OUTOF10: 7
PAINLEVEL_OUTOF10: 8
PAINLEVEL_OUTOF10: 9
PAINLEVEL_OUTOF10: 10 - WORST POSSIBLE PAIN
PAINLEVEL_OUTOF10: 7
PAINLEVEL_OUTOF10: 9
PAINLEVEL_OUTOF10: 0 - NO PAIN

## 2023-12-23 ASSESSMENT — PAIN DESCRIPTION - LOCATION: LOCATION: GENERALIZED

## 2023-12-23 NOTE — CARE PLAN
Problem: Fall/Injury  Goal: Not fall by end of shift  Outcome: Progressing  Goal: Be free from injury by end of the shift  Outcome: Progressing  Goal: Verbalize understanding of personal risk factors for fall in the hospital  Outcome: Progressing  Goal: Verbalize understanding of risk factor reduction measures to prevent injury from fall in the home  Outcome: Progressing  Goal: Use assistive devices by end of the shift  Outcome: Progressing  Goal: Pace activities to prevent fatigue by end of the shift  Outcome: Progressing     Problem: Pain  Goal: Takes deep breaths with improved pain control throughout the shift  Outcome: Progressing  Goal: Turns in bed with improved pain control throughout the shift  Outcome: Progressing  Goal: Walks with improved pain control throughout the shift  Outcome: Progressing  Goal: Performs ADL's with improved pain control throughout shift  Outcome: Progressing  Goal: Participates in PT with improved pain control throughout the shift  Outcome: Progressing  Goal: Free from opioid side effects throughout the shift  Outcome: Progressing  Goal: Free from acute confusion related to pain meds throughout the shift  Outcome: Progressing       The clinical goals for the shift include pt will rate pain less than 8/10 throughout shift    Over the shift, the patient remained safe and free from injury. Received pain meds PRN throughout shift, no acute events overnight

## 2023-12-23 NOTE — PROGRESS NOTES
"Joellen Narayan is a 23 y.o. male on day 2 of admission presenting with Sickle-cell disease with pain (CMS/HCC).    Subjective   Seen this morning at bedside, pain has improved but still having pain in his lower legs. We discussed keeping pain management with IV Dilaudid. We discussed possible discharge tomorrow for outpatient apt on 12/26. Patient is eating and drinking well, having bowel movements. He denies any shortness of breath, chest pain, abdominal pain, N/V/D, F/C, H/A.     Objective     Physical Exam  Constitutional:       Appearance: Normal appearance.   HENT:      Head: Normocephalic.      Nose: Nose normal.      Mouth/Throat:      Mouth: Mucous membranes are dry.   Eyes:      General: Scleral icterus present.   Cardiovascular:      Rate and Rhythm: Normal rate and regular rhythm.   Pulmonary:      Effort: Pulmonary effort is normal.      Breath sounds: Normal breath sounds.   Abdominal:      General: Abdomen is flat.      Palpations: Abdomen is soft.   Musculoskeletal:         General: Normal range of motion.      Cervical back: Normal range of motion.      Comments: Tenderness of right foot. Trace edema of right upper extremity    Skin:     General: Skin is warm.   Neurological:      General: No focal deficit present.      Mental Status: He is alert.   Psychiatric:         Mood and Affect: Mood normal.       Last Recorded Vitals  Blood pressure 132/55, pulse 72, temperature 36.9 °C (98.4 °F), temperature source Temporal, resp. rate 18, height 1.88 m (6' 2\"), weight 63.5 kg (140 lb), SpO2 92 %.  Intake/Output last 3 Shifts:  No intake/output data recorded.    Relevant Results  Labs reviewed.  Vascular US upper extremity venous duplex right  Result Date: 12/22/2023  CONCLUSIONS: Right Upper Venous: No evidence of acute deep vein thrombus visualized in the right upper extremity. Left Upper Venous: The subclavian vein demonstrates a normal spontaneous and phasic flow.      NM PET CT lymphoma " diagnosis  Result Date: 12/20/2023  Interpreted By:  Linda Squires  and Blanca Plummer STUDY: NM PET CT LYMPHOMA DIAGNOSIS;  12/20/2023 8:52 am    1. Interval increase in size and FDG avidity of the retroperitoneal and pelvic lymphadenopathy, consistent with known lymphoma. No FDG avid lymphadenopathy above the diaphragm. 2. Interval development of multiple FDG avid focus within sternum, multiple vertebral bodies, involving predominantly the endplates, superior right acetabular wall, left femoral head and sternum manubrium without focal abnormality on low-dose PET-CT, likely represents areas of bone infarcts in the setting of sickle cell disease. Sites of extranodal disease are felt to be less likely, however can not be excluded. Correlation with MRI of the spine and pelvis is recommended.   I personally reviewed the images/study and I agree with the findings as stated by Resident Kavitha Rios. This study was interpreted at [place], [place].   MACRO: None   Signed by: Linda Squires 12/20/2023 12:22 PM Dictation workstation:   [id number]    XR foot right 3+ views  Result Date: 12/19/2023  FINDINGS:  There is no displaced fracture.  The alignment is anatomic.  No soft tissue abnormality is seen.  No acute osseous abnormalities. Signed by Aquilino Easley MD    XR chest 2 views  Result Date: 12/19/2023  1.  No evidence of acute cardiopulmonary process. 2. Stable mild cardiomegaly.     CT abdomen pelvis w IV contrast  Result Date: 11/28/2023  1.  Increased size of retroperitoneal lymph nodes present on imaging dating back to (04/01/2022) as described above. These findings could reflect extramedullary hematopoiesis in the setting of sickle cell versus indolent lymphoma. PET-CT and tissue diagnosis recommended   XR chest 1 view    Result Date: 11/28/2023 FINDINGS: Cardiomegaly unchanged. No consolidation, effusion, edema, or pneumothorax.  No evidence of acute intrathoracic abnormality.        Assessment/Plan   Principal Problem:    Sickle-cell disease with pain (CMS/HCC)  Active Problems:    Hodgkin lymphoma (CMS/HCC)    Joellen Narayan is a 23 y.o. male with PMH of Sickle Cell disease (HbSS c/b dactylitis, mild splenic sequestration in 2001, priapism, hx of acute chest syndrome 2/2023), newly diagnosed Hodgkin Lymphoma, LVH, and nocturnal hypoxia (intermittently in 2L NC at home) who presents to [facility] ED with complaints of right foot/RLE pain and chest pain consistent with his typical sickle cell pain. EKG without T wave changes. Hgb and lysis labs at baseline. Of note, pt with recent bx of paraaortic LN 11/30 resulting with Hodgkin Lymphoma. Discussed results with patient on 12/19. Discussed with onc, PET approved by Dr. Frias while inpatient. PET (12/20) showing increase in size and avidity of RP and pelvic lymphadenopathy. Interval development on multiple FDG avid focus within sternum, vertebral body, R acetabular wall and L femoral head and sternum manubrium, rec MRI spine/pelvis to lymphoma lesions. MRI C/T/L spine (12/20) slight decreased marrow signal throughout the spine consistent with chronic anemia in sickle cell disease. MRI Pelvis (12/20) T2 and FLAIR hyperintense signal of the R superior endplate of the L5 vertebral body, R superior acetabulum, and L femoral head corresponding to hypermetabolic lesions seen on PET that may represent osseous lymphomatous involvement. Started IV dilaudid for pain management, transitioned to dilaudid PCA 12/21 d/t uncontrolled pain. LFTs and Tbili up trending. RUE US pending for right upper extremity edema. DC pending improvement in pain.     # Hgb SS with pain  - Hx acute chest syndrome (last 2/2023) and priapism   - [facility] and not on any disease modifying medications   - Hgb baseline ~8.5, Hgb 7.1 (12/22)  - Tbili baseline fluctuates but ~5-7, Tbili 8.3 (12/22); likely increased 2/2 current pain  crisis- will monitor closely for now  - LDH baseline fluctuates but ~500,  (12/22)  - OARRS reviewed no aberrancies  - No current care path   - Off Dilaudid PCA, for pain will  cont IV Dilaudid 2mg q 2hrs PRN  - Continue IV Toradol 30mg q6hrs x3 days with Protonix for PPI prophy  - Added Lidocaine patch  - CXR (12/19): negative for acute process  - X-ray 3-view R foot (12/19): no acute osseous abnormalities  - Discussed MRI foot with pt as he was having persistent pain but decided not to do as pt had MRI previous day and was claustrophobic and MRI foot not likely to show anything acute  - Hgb S (12/19): 85.2%  - Utox (12/19): +cannabinoid  - PO Zofran PRN for opioid-induced nausea, PO Benadryl PRN for opioid-induced pruritis  - Bowel regimen for opioid-induced constipation with DocuSenna 2tabs BID and Miralax daily   - On 2L oxygen, will titrate off prior to discharge, uses 2L nocturnal oxygen   - F/U CXR for new hypoxia   - F/U RUQ US for eval worsening T-bili      # Hodgkin Lymphoma  - Enlarged lymph nodes noted 4/1/22  - RUQ US (11/14/22) with mildly enlarged LNs in the region of the kavin hepatis  - MRI liver (11/16/22) showed re-demonstration of bulky retroperitoneal lymphadenopathy and kavin hepatic lymphadenopathy    - 11/18 lymph node biopsy showed atypical lymphoid infiltrate. Reviewed by Hemepath board, insufficient for lymphoma diagnosis  - PET/CT 12/6/22 showing retroperitoneal lymphadenopathy  - Followed up with Dr. Stoll 12/16/22 with plan for surg/onc consult for large tissue biopsy but patient missed apt and was lost to follow up  - Requested new apt with Dr. Ronnie Marte 6/19/23, patient was not seen and lost to follow up  - CT a/p (11/28) increased size of retroperitoneal lymph nodes reflecting extramedullary hematopoiesis in the setting of sickle cell versus lymphoma  - Hematology consulted rec consulting surg onc for excisional LN bx  - Surg onc consulted 11/29, rec bx with IR  - Flow  cytometry (11/30): no clonal B cell or T cell population identified, lymphocyte 95%, CD3+CD4+ 68%, CD3+CD8+ 7%, CD19+ 24%  - Paraaortic LN bx (11/30) consistent with Hodgkin Lymphoma  - Bx results discussed with patient 12/19  - PET/CT (12/20) showing increase in size and avidity of RP and pelvic lymphadenopathy. Interval development on multiple FDG avid focus within sternum, vertebral body, R acetabular wall and L femoral head and sternum manubrium, rec MRI spine/pelvis to lymphoma lesions  - MRI C/T/L spine (12/20) slight decreased marrow signal throughout the spine consistent with chronic anemia in sickle cell disease  - MRI Pelvis (12/20) T2 and FLAIR hyperintense signal of the R superior endplate of the L5 vertebral body, R superior acetabulum, and L femoral head corresponding to hypermetabolic lesions seen on PET that may represent osseous lymphomatous involvement  - Discussed MRI spine/pelvis findings with pt/mother 12/21  - Emailed Dr. Stoll 12/19, follow-up visit scheduled for 12/26   - Uric acid (12/19) 4.8; Baseline LDH ~500,  (12/20)     # DVT prophy:   - Lovenox SQ, SCDs, encourage ambulation    # Misc  - 12/22 start melatonin 5mg nightly for insomnia      # DISPO:  - Full Code  - DC pending improvement in pain   - GI FUV 12/21, Sickle Cell FUV (Dr. Cruz) 12/26, Dr. Stoll FUV (onc) 12/26         I spent 60 minutes in the professional and overall care of this patient.  Sachin Reynolds PA-C

## 2023-12-24 VITALS
RESPIRATION RATE: 18 BRPM | BODY MASS INDEX: 17.97 KG/M2 | HEIGHT: 74 IN | SYSTOLIC BLOOD PRESSURE: 130 MMHG | HEART RATE: 73 BPM | WEIGHT: 140 LBS | DIASTOLIC BLOOD PRESSURE: 77 MMHG | OXYGEN SATURATION: 93 % | TEMPERATURE: 97.2 F

## 2023-12-24 LAB
ALBUMIN SERPL BCP-MCNC: 4.8 G/DL (ref 3.4–5)
ALP SERPL-CCNC: 102 U/L (ref 33–120)
ALT SERPL W P-5'-P-CCNC: 36 U/L (ref 10–52)
ANION GAP SERPL CALC-SCNC: 15 MMOL/L (ref 10–20)
AST SERPL W P-5'-P-CCNC: 67 U/L (ref 9–39)
BASOPHILS # BLD AUTO: 0.07 X10*3/UL (ref 0–0.1)
BASOPHILS NFR BLD AUTO: 0.5 %
BILIRUB SERPL-MCNC: 11.2 MG/DL (ref 0–1.2)
BUN SERPL-MCNC: 9 MG/DL (ref 6–23)
CALCIUM SERPL-MCNC: 10.1 MG/DL (ref 8.6–10.6)
CHLORIDE SERPL-SCNC: 100 MMOL/L (ref 98–107)
CO2 SERPL-SCNC: 26 MMOL/L (ref 21–32)
CREAT SERPL-MCNC: 0.69 MG/DL (ref 0.5–1.3)
EOSINOPHIL # BLD AUTO: 0.38 X10*3/UL (ref 0–0.7)
EOSINOPHIL NFR BLD AUTO: 2.8 %
ERYTHROCYTE [DISTWIDTH] IN BLOOD BY AUTOMATED COUNT: 30.5 % (ref 11.5–14.5)
GFR SERPL CREATININE-BSD FRML MDRD: >90 ML/MIN/1.73M*2
GLUCOSE SERPL-MCNC: 72 MG/DL (ref 74–99)
HCT VFR BLD AUTO: 22.8 % (ref 41–52)
HGB BLD-MCNC: 8.2 G/DL (ref 13.5–17.5)
HGB RETIC QN: 30 PG (ref 28–38)
HOWELL-JOLLY BOD BLD QL SMEAR: PRESENT
HYPOCHROMIA BLD QL SMEAR: NORMAL
IMM GRANULOCYTES # BLD AUTO: 0.14 X10*3/UL (ref 0–0.7)
IMM GRANULOCYTES NFR BLD AUTO: 1 % (ref 0–0.9)
IMMATURE RETIC FRACTION: 30.2 %
LDH SERPL L TO P-CCNC: 611 U/L (ref 84–246)
LYMPHOCYTES # BLD AUTO: 3.36 X10*3/UL (ref 1.2–4.8)
LYMPHOCYTES NFR BLD AUTO: 24.5 %
MCH RBC QN AUTO: 27.2 PG (ref 26–34)
MCHC RBC AUTO-ENTMCNC: 36 G/DL (ref 32–36)
MCV RBC AUTO: 76 FL (ref 80–100)
MONOCYTES # BLD AUTO: 1.97 X10*3/UL (ref 0.1–1)
MONOCYTES NFR BLD AUTO: 14.3 %
NEUTROPHILS # BLD AUTO: 7.82 X10*3/UL (ref 1.2–7.7)
NEUTROPHILS NFR BLD AUTO: 56.9 %
NRBC BLD-RTO: 4.1 /100 WBCS (ref 0–0)
OVALOCYTES BLD QL SMEAR: NORMAL
PLATELET # BLD AUTO: 304 X10*3/UL (ref 150–450)
POLYCHROMASIA BLD QL SMEAR: NORMAL
POTASSIUM SERPL-SCNC: 4.7 MMOL/L (ref 3.5–5.3)
PROT SERPL-MCNC: 7.7 G/DL (ref 6.4–8.2)
RBC # BLD AUTO: 3.01 X10*6/UL (ref 4.5–5.9)
RBC MORPH BLD: NORMAL
RETICS #: 0.65 X10*6/UL (ref 0.02–0.12)
RETICS/RBC NFR AUTO: 21.7 % (ref 0.5–2)
SCHISTOCYTES BLD QL SMEAR: NORMAL
SICKLE CELLS BLD QL SMEAR: NORMAL
SODIUM SERPL-SCNC: 136 MMOL/L (ref 136–145)
TARGETS BLD QL SMEAR: NORMAL
WBC # BLD AUTO: 13.7 X10*3/UL (ref 4.4–11.3)

## 2023-12-24 PROCEDURE — 36415 COLL VENOUS BLD VENIPUNCTURE: CPT

## 2023-12-24 PROCEDURE — 96372 THER/PROPH/DIAG INJ SC/IM: CPT | Performed by: NURSE PRACTITIONER

## 2023-12-24 PROCEDURE — 99223 1ST HOSP IP/OBS HIGH 75: CPT | Performed by: INTERNAL MEDICINE

## 2023-12-24 PROCEDURE — 2500000004 HC RX 250 GENERAL PHARMACY W/ HCPCS (ALT 636 FOR OP/ED)

## 2023-12-24 PROCEDURE — 85025 COMPLETE CBC W/AUTO DIFF WBC: CPT

## 2023-12-24 PROCEDURE — 99239 HOSP IP/OBS DSCHRG MGMT >30: CPT | Performed by: PHYSICIAN ASSISTANT

## 2023-12-24 PROCEDURE — 83615 LACTATE (LD) (LDH) ENZYME: CPT

## 2023-12-24 PROCEDURE — 80053 COMPREHEN METABOLIC PANEL: CPT

## 2023-12-24 PROCEDURE — 2500000004 HC RX 250 GENERAL PHARMACY W/ HCPCS (ALT 636 FOR OP/ED): Performed by: NURSE PRACTITIONER

## 2023-12-24 PROCEDURE — 85045 AUTOMATED RETICULOCYTE COUNT: CPT

## 2023-12-24 RX ADMIN — HYDROMORPHONE HYDROCHLORIDE 2 MG: 1 INJECTION, SOLUTION INTRAMUSCULAR; INTRAVENOUS; SUBCUTANEOUS at 03:41

## 2023-12-24 RX ADMIN — PANTOPRAZOLE SODIUM 40 MG: 40 TABLET, DELAYED RELEASE ORAL at 08:21

## 2023-12-24 RX ADMIN — HYDROMORPHONE HYDROCHLORIDE 2 MG: 1 INJECTION, SOLUTION INTRAMUSCULAR; INTRAVENOUS; SUBCUTANEOUS at 06:04

## 2023-12-24 RX ADMIN — ENOXAPARIN SODIUM 40 MG: 100 INJECTION SUBCUTANEOUS at 08:22

## 2023-12-24 RX ADMIN — HYDROMORPHONE HYDROCHLORIDE 2 MG: 1 INJECTION, SOLUTION INTRAMUSCULAR; INTRAVENOUS; SUBCUTANEOUS at 01:27

## 2023-12-24 ASSESSMENT — PAIN SCALES - GENERAL
PAINLEVEL_OUTOF10: 2
PAINLEVEL_OUTOF10: 9
PAINLEVEL_OUTOF10: 9
PAINLEVEL_OUTOF10: 8

## 2023-12-24 ASSESSMENT — COGNITIVE AND FUNCTIONAL STATUS - GENERAL
DAILY ACTIVITIY SCORE: 24
MOBILITY SCORE: 24

## 2023-12-24 ASSESSMENT — ACTIVITIES OF DAILY LIVING (ADL): EFFECT OF PAIN ON DAILY ACTIVITIES: PAINFUL TO WALK

## 2023-12-24 ASSESSMENT — PAIN - FUNCTIONAL ASSESSMENT
PAIN_FUNCTIONAL_ASSESSMENT: 0-10

## 2023-12-24 ASSESSMENT — PAIN DESCRIPTION - DESCRIPTORS: DESCRIPTORS: THROBBING

## 2023-12-24 NOTE — NURSING NOTE
Patient discharged home. Discharge instructions were gone over prior to leaving, patient had no questions. Patients IV was removed before leaving by RN.

## 2023-12-24 NOTE — DISCHARGE SUMMARY
Discharge Diagnosis  Sickle-cell disease with pain (CMS/HCC)    Issues Requiring Follow-Up  Will follow up with Dr. Stoll 12/26 for treatment of new lymphoma.   Hospital Course  Joellen Narayan is a 23 y.o. male with PMH of Sickle Cell disease (HbSS c/b dactylitis, mild splenic sequestration in 2001, priapism, hx of acute chest syndrome 2/2023), newly diagnosed Hodgkin Lymphoma, LVH, and nocturnal hypoxia (intermittently in 2L NC at home) who presented to [facility] ED with complaints of right foot/RLE pain and chest pain consistent with his typical sickle cell pain. EKG without T wave changes. Hgb and lysis labs at baseline. Of note, pt with recent bx of paraaortic LN 11/30 resulting with Hodgkin Lymphoma. Discussed results with patient on 12/19. Discussed with onc, PET approved by Dr. Frias while inpatient. PET (12/20) showing increase in size and avidity of RP and pelvic lymphadenopathy. Interval development on multiple FDG avid focus within sternum, vertebral body, R acetabular wall and L femoral head and sternum manubrium, rec MRI spine/pelvis to lymphoma lesions. MRI C/T/L spine (12/20) slight decreased marrow signal throughout the spine consistent with chronic anemia in sickle cell disease. MRI Pelvis (12/20) T2 and FLAIR hyperintense signal of the R superior endplate of the L5 vertebral body, R superior acetabulum, and L femoral head corresponding to hypermetabolic lesions seen on PET that may represent osseous lymphomatous involvement.  For pain management he was started on IV dilaudid then transitioned to dilaudid PCA 12/21 d/t uncontrolled pain. LFTs and Tbili were up trending. RUQ US shows: Cholelithiasis without evidence of acute cholecystitis. Patient has had elevated T-bili on previous labs prom sickle cell crisis. RUE US  for right upper extremity edema that was done did not show any clots. Today is pain is well controlled and is discharged in stable condition. FUV with Dr. Stoll on 12/26 for  treatment of recently diagnosed Hodgkin Lymphoma.   On the day of discharge, the patient reported feeling well and pain was controlled. Vitals and labs were stable.   Attending has reviewed all labs and vitals, and discussed and agreed with the discharge plan prior to patient discharge.  Patient discharged in stable condition. > 30 minutes spent on discharge planning.    Pertinent Physical Exam At Time of Discharge  Physical Exam    Home Medications     Medication List      You have not been prescribed any medications.       Outpatient Follow-Up  Future Appointments   Date Time Provider Department [place]   12/26/2023  9:00 AM Melissa Stoll MD [id number] [place]   12/26/2023 10:30 AM RAAD Collado-CNP [id number] [facility]       Sachin Reynolds PA-C

## 2023-12-24 NOTE — CARE PLAN
Problem: Fall/Injury  Goal: Not fall by end of shift  Outcome: Progressing  Goal: Be free from injury by end of the shift  Outcome: Progressing  Goal: Verbalize understanding of personal risk factors for fall in the hospital  Outcome: Progressing  Goal: Verbalize understanding of risk factor reduction measures to prevent injury from fall in the home  Outcome: Progressing  Goal: Use assistive devices by end of the shift  Outcome: Progressing  Goal: Pace activities to prevent fatigue by end of the shift  Outcome: Progressing     Problem: Pain  Goal: Takes deep breaths with improved pain control throughout the shift  Outcome: Progressing  Goal: Turns in bed with improved pain control throughout the shift  Outcome: Progressing  Goal: Walks with improved pain control throughout the shift  Outcome: Progressing  Goal: Performs ADL's with improved pain control throughout shift  Outcome: Progressing  Goal: Participates in PT with improved pain control throughout the shift  Outcome: Progressing  Goal: Free from opioid side effects throughout the shift  Outcome: Progressing  Goal: Free from acute confusion related to pain meds throughout the shift  Outcome: Progressing       The clinical goals for the shift include pt will rate pain less than 8/10 throughout shift    Over the shift, the patient remained safe and free from injury.. had pain, medicated PRN. Received RUQ US overnight. No acute events overnight.

## 2023-12-25 ENCOUNTER — APPOINTMENT (OUTPATIENT)
Dept: RADIOLOGY | Facility: HOSPITAL | Age: 23
End: 2023-12-25
Payer: COMMERCIAL

## 2023-12-25 ENCOUNTER — HOSPITAL ENCOUNTER (OUTPATIENT)
Facility: HOSPITAL | Age: 23
Setting detail: OBSERVATION
Discharge: HOME | End: 2023-12-25
Attending: EMERGENCY MEDICINE | Admitting: INTERNAL MEDICINE
Payer: COMMERCIAL

## 2023-12-25 VITALS
RESPIRATION RATE: 16 BRPM | HEART RATE: 76 BPM | SYSTOLIC BLOOD PRESSURE: 122 MMHG | OXYGEN SATURATION: 95 % | BODY MASS INDEX: 17.97 KG/M2 | WEIGHT: 140 LBS | DIASTOLIC BLOOD PRESSURE: 41 MMHG | TEMPERATURE: 98.6 F | HEIGHT: 74 IN

## 2023-12-25 DIAGNOSIS — D57.00 SICKLE-CELL DISEASE WITH PAIN (MULTI): ICD-10-CM

## 2023-12-25 DIAGNOSIS — D57.00 SICKLE CELL PAIN CRISIS (MULTI): Primary | ICD-10-CM

## 2023-12-25 LAB
ALBUMIN SERPL BCP-MCNC: 5.2 G/DL (ref 3.4–5)
ALP SERPL-CCNC: 119 U/L (ref 33–120)
ALT SERPL W P-5'-P-CCNC: 40 U/L (ref 10–52)
ANION GAP SERPL CALC-SCNC: 16 MMOL/L (ref 10–20)
AST SERPL W P-5'-P-CCNC: 68 U/L (ref 9–39)
ATRIAL RATE: 101 BPM
BASOPHILS # BLD MANUAL: 0.3 X10*3/UL (ref 0–0.1)
BASOPHILS NFR BLD MANUAL: 1.7 %
BILIRUB SERPL-MCNC: 13.1 MG/DL (ref 0–1.2)
BUN SERPL-MCNC: 11 MG/DL (ref 6–23)
CALCIUM SERPL-MCNC: 10.6 MG/DL (ref 8.6–10.6)
CHLORIDE SERPL-SCNC: 103 MMOL/L (ref 98–107)
CO2 SERPL-SCNC: 23 MMOL/L (ref 21–32)
CREAT SERPL-MCNC: 1.01 MG/DL (ref 0.5–1.3)
EOSINOPHIL # BLD MANUAL: 0.75 X10*3/UL (ref 0–0.7)
EOSINOPHIL NFR BLD MANUAL: 4.3 %
ERYTHROCYTE [DISTWIDTH] IN BLOOD BY AUTOMATED COUNT: 32.1 % (ref 11.5–14.5)
FLUAV RNA RESP QL NAA+PROBE: NOT DETECTED
FLUBV RNA RESP QL NAA+PROBE: NOT DETECTED
GFR SERPL CREATININE-BSD FRML MDRD: >90 ML/MIN/1.73M*2
GLUCOSE SERPL-MCNC: 78 MG/DL (ref 74–99)
HCT VFR BLD AUTO: 24.5 % (ref 41–52)
HGB BLD-MCNC: 9.1 G/DL (ref 13.5–17.5)
HGB RETIC QN: 31 PG (ref 28–38)
HOLD SPECIMEN: NORMAL
HOLD SPECIMEN: NORMAL
HYPOCHROMIA BLD QL SMEAR: ABNORMAL
IMM GRANULOCYTES # BLD AUTO: 0.53 X10*3/UL (ref 0–0.7)
IMM GRANULOCYTES NFR BLD AUTO: 3 % (ref 0–0.9)
IMMATURE RETIC FRACTION: 34.5 %
LDH SERPL L TO P-CCNC: 711 U/L (ref 84–246)
LYMPHOCYTES # BLD MANUAL: 4.53 X10*3/UL (ref 1.2–4.8)
LYMPHOCYTES NFR BLD MANUAL: 25.9 %
MCH RBC QN AUTO: 28.7 PG (ref 26–34)
MCHC RBC AUTO-ENTMCNC: 37.1 G/DL (ref 32–36)
MCV RBC AUTO: 77 FL (ref 80–100)
MONOCYTES # BLD MANUAL: 1.35 X10*3/UL (ref 0.1–1)
MONOCYTES NFR BLD MANUAL: 7.7 %
NEUTS SEG # BLD MANUAL: 10.41 X10*3/UL (ref 1.2–7)
NEUTS SEG NFR BLD MANUAL: 59.5 %
NRBC BLD-RTO: 8.1 /100 WBCS (ref 0–0)
P AXIS: 84 DEGREES
P OFFSET: 188 MS
P ONSET: 143 MS
PLATELET # BLD AUTO: 325 X10*3/UL (ref 150–450)
POLYCHROMASIA BLD QL SMEAR: ABNORMAL
POTASSIUM SERPL-SCNC: 4.3 MMOL/L (ref 3.5–5.3)
PR INTERVAL: 146 MS
PROT SERPL-MCNC: 8.6 G/DL (ref 6.4–8.2)
Q ONSET: 216 MS
QRS COUNT: 17 BEATS
QRS DURATION: 94 MS
QT INTERVAL: 344 MS
QTC CALCULATION(BAZETT): 446 MS
QTC FREDERICIA: 409 MS
R AXIS: 94 DEGREES
RBC # BLD AUTO: 3.17 X10*6/UL (ref 4.5–5.9)
RBC MORPH BLD: ABNORMAL
RETICS #: 0.76 X10*6/UL (ref 0.02–0.12)
RETICS/RBC NFR AUTO: 24 % (ref 0.5–2)
SARS-COV-2 RNA RESP QL NAA+PROBE: NOT DETECTED
SCHISTOCYTES BLD QL SMEAR: ABNORMAL
SICKLE CELLS BLD QL SMEAR: ABNORMAL
SODIUM SERPL-SCNC: 138 MMOL/L (ref 136–145)
T AXIS: -58 DEGREES
T OFFSET: 388 MS
TARGETS BLD QL SMEAR: ABNORMAL
TOTAL CELLS COUNTED BLD: 116
VARIANT LYMPHS # BLD MANUAL: 0.16 X10*3/UL (ref 0–0.5)
VARIANT LYMPHS NFR BLD: 0.9 %
VENTRICULAR RATE: 101 BPM
WBC # BLD AUTO: 17.5 X10*3/UL (ref 4.4–11.3)

## 2023-12-25 PROCEDURE — 2500000001 HC RX 250 WO HCPCS SELF ADMINISTERED DRUGS (ALT 637 FOR MEDICARE OP)

## 2023-12-25 PROCEDURE — 87636 SARSCOV2 & INF A&B AMP PRB: CPT

## 2023-12-25 PROCEDURE — 99285 EMERGENCY DEPT VISIT HI MDM: CPT | Performed by: EMERGENCY MEDICINE

## 2023-12-25 PROCEDURE — 71046 X-RAY EXAM CHEST 2 VIEWS: CPT | Performed by: RADIOLOGY

## 2023-12-25 PROCEDURE — 2500000001 HC RX 250 WO HCPCS SELF ADMINISTERED DRUGS (ALT 637 FOR MEDICARE OP): Performed by: EMERGENCY MEDICINE

## 2023-12-25 PROCEDURE — 85007 BL SMEAR W/DIFF WBC COUNT: CPT

## 2023-12-25 PROCEDURE — 93010 ELECTROCARDIOGRAM REPORT: CPT | Performed by: EMERGENCY MEDICINE

## 2023-12-25 PROCEDURE — 85027 COMPLETE CBC AUTOMATED: CPT

## 2023-12-25 PROCEDURE — 96375 TX/PRO/DX INJ NEW DRUG ADDON: CPT

## 2023-12-25 PROCEDURE — 96376 TX/PRO/DX INJ SAME DRUG ADON: CPT

## 2023-12-25 PROCEDURE — 2500000004 HC RX 250 GENERAL PHARMACY W/ HCPCS (ALT 636 FOR OP/ED)

## 2023-12-25 PROCEDURE — 71046 X-RAY EXAM CHEST 2 VIEWS: CPT

## 2023-12-25 PROCEDURE — 85060 BLOOD SMEAR INTERPRETATION: CPT | Performed by: PATHOLOGY

## 2023-12-25 PROCEDURE — 80053 COMPREHEN METABOLIC PANEL: CPT

## 2023-12-25 PROCEDURE — 36415 COLL VENOUS BLD VENIPUNCTURE: CPT

## 2023-12-25 PROCEDURE — G0378 HOSPITAL OBSERVATION PER HR: HCPCS

## 2023-12-25 PROCEDURE — 83615 LACTATE (LD) (LDH) ENZYME: CPT

## 2023-12-25 PROCEDURE — 96374 THER/PROPH/DIAG INJ IV PUSH: CPT

## 2023-12-25 PROCEDURE — 85045 AUTOMATED RETICULOCYTE COUNT: CPT

## 2023-12-25 RX ORDER — OXYCODONE HYDROCHLORIDE 5 MG/1
10 TABLET ORAL ONCE
Status: COMPLETED | OUTPATIENT
Start: 2023-12-25 | End: 2023-12-25

## 2023-12-25 RX ORDER — KETOROLAC TROMETHAMINE 30 MG/ML
30 INJECTION, SOLUTION INTRAMUSCULAR; INTRAVENOUS ONCE
Status: COMPLETED | OUTPATIENT
Start: 2023-12-25 | End: 2023-12-25

## 2023-12-25 RX ORDER — OXYCODONE HYDROCHLORIDE 5 MG/1
5 TABLET ORAL ONCE
Status: COMPLETED | OUTPATIENT
Start: 2023-12-25 | End: 2023-12-25

## 2023-12-25 RX ORDER — HYDROMORPHONE HYDROCHLORIDE 1 MG/ML
1 INJECTION, SOLUTION INTRAMUSCULAR; INTRAVENOUS; SUBCUTANEOUS
Status: DISCONTINUED | OUTPATIENT
Start: 2023-12-25 | End: 2023-12-25

## 2023-12-25 RX ORDER — DIPHENHYDRAMINE HCL 25 MG
25 CAPSULE ORAL ONCE
Status: COMPLETED | OUTPATIENT
Start: 2023-12-25 | End: 2023-12-25

## 2023-12-25 RX ORDER — OXYCODONE HYDROCHLORIDE 15 MG/1
15 TABLET ORAL EVERY 6 HOURS PRN
Qty: 20 TABLET | Refills: 0 | Status: SHIPPED | OUTPATIENT
Start: 2023-12-25 | End: 2023-12-30

## 2023-12-25 RX ORDER — HYDROMORPHONE HYDROCHLORIDE 1 MG/ML
1 INJECTION, SOLUTION INTRAMUSCULAR; INTRAVENOUS; SUBCUTANEOUS
Status: COMPLETED | OUTPATIENT
Start: 2023-12-25 | End: 2023-12-25

## 2023-12-25 RX ADMIN — OXYCODONE HYDROCHLORIDE 5 MG: 5 TABLET ORAL at 14:51

## 2023-12-25 RX ADMIN — DIPHENHYDRAMINE HYDROCHLORIDE 25 MG: 25 CAPSULE ORAL at 10:15

## 2023-12-25 RX ADMIN — HYDROMORPHONE HYDROCHLORIDE 1 MG: 1 INJECTION, SOLUTION INTRAMUSCULAR; INTRAVENOUS; SUBCUTANEOUS at 11:42

## 2023-12-25 RX ADMIN — HYDROMORPHONE HYDROCHLORIDE 1 MG: 1 INJECTION, SOLUTION INTRAMUSCULAR; INTRAVENOUS; SUBCUTANEOUS at 09:37

## 2023-12-25 RX ADMIN — HYDROMORPHONE HYDROCHLORIDE 1 MG: 1 INJECTION, SOLUTION INTRAMUSCULAR; INTRAVENOUS; SUBCUTANEOUS at 10:38

## 2023-12-25 RX ADMIN — OXYCODONE HYDROCHLORIDE 10 MG: 5 TABLET ORAL at 15:19

## 2023-12-25 RX ADMIN — KETOROLAC TROMETHAMINE 30 MG: 30 INJECTION, SOLUTION INTRAMUSCULAR; INTRAVENOUS at 13:11

## 2023-12-25 ASSESSMENT — PAIN DESCRIPTION - LOCATION
LOCATION: ABDOMEN
LOCATION_2: CHEST

## 2023-12-25 ASSESSMENT — PAIN SCALES - GENERAL
PAINLEVEL_OUTOF10: 10 - WORST POSSIBLE PAIN
PAINLEVEL_OUTOF10: 7
PAINLEVEL_OUTOF10: 10 - WORST POSSIBLE PAIN
PAINLEVEL_OUTOF10: 6

## 2023-12-25 ASSESSMENT — COLUMBIA-SUICIDE SEVERITY RATING SCALE - C-SSRS
2. HAVE YOU ACTUALLY HAD ANY THOUGHTS OF KILLING YOURSELF?: NO
6. HAVE YOU EVER DONE ANYTHING, STARTED TO DO ANYTHING, OR PREPARED TO DO ANYTHING TO END YOUR LIFE?: NO
1. IN THE PAST MONTH, HAVE YOU WISHED YOU WERE DEAD OR WISHED YOU COULD GO TO SLEEP AND NOT WAKE UP?: NO

## 2023-12-25 ASSESSMENT — PAIN - FUNCTIONAL ASSESSMENT
PAIN_FUNCTIONAL_ASSESSMENT: 0-10
PAIN_FUNCTIONAL_ASSESSMENT: 0-10

## 2023-12-25 NOTE — PROGRESS NOTES
I was consulted by provider to help set up transportation for the patient. The patient has an appointment at 9:00AM 12/26/2023 at the [facility] at [address] [place] [address] in [place] [address]. The provider states he does not have transportation to get to the appointment. I spoke to the patient at bedside. The patient confirmed the appointment time and location. The patient gave me his cell phone number, [phone]. The patient confirmed his home address. I arranged a round trip [employer] transport in the Round Trip website. The patient will be picked up at 8:00 AM from his home and transported to [facility]. The provider informed me that the appointment will take about an hour. I arranged transport to pick the patient up from [facility] to his home at 10:30. I printed and gave the patient a piece of paper with the transport times. The patient stated he understands and he has used the Ly[employer] service in the past. MD and bedside nurse updated.       Abbe Fairchild RN

## 2023-12-25 NOTE — ED PROVIDER NOTES
CC: Sickle Cell Pain Crisis     HPI:  Patient is a 23-year-old male with a past medical history of sickle cell disease (C/B splenic/sequestration, priapism, and acute chest syndrome), newly diagnosed Hodgkin lymphoma, LVH, and nocturnal hypoxia (intermittently on 2 L nasal cannula at home) who presented to [facility] ED for pain typical of her sickle cell pain crisis.  Patient noted that his pain began this morning at 4 AM.  Patient notes that his pain is located in his chest, abdomen, and left lower extremity typical of his sickle cell pain crisis.  Patient rates his pain as 10 out of 10.  Patient notes taking naproxen at home without any improvement of his pain.  Of note, patient was admitted from 12/19/2023 through 12/24/2023 for treatment of sickle cell pain crisis.  During admission, patient was made aware of a biopsy of a para-aortic lymph node from 11/30/2023 that was consistent with nodular lymphocyte predominant Hodgkin's lymphoma. Patient has not initiated treatment.  He has an appointment to discuss treatment with Dr. Stoll on 12/26/23.  Denied fevers, chills, nausea, vomiting, trauma, falls, shortness of breath, or headache.    Limitations to history: None  Independent historian(s): Mom is at bedside  Records Reviewed: Recent available ED and inpatient notes reviewed in EMR.    PMHx/PSHx:  Per HPI.   - has a past medical history of Corrosion of unspecified body region, unspecified degree (12/31/2014), Personal history of diseases of the blood and blood-forming organs and certain disorders involving the immune mechanism, Personal history of other (healed) physical injury and trauma (01/03/2015), Personal history of other diseases of the circulatory system, Personal history of other diseases of the circulatory system, and Rash and other nonspecific skin eruption (09/09/2014).  - has a past surgical history that includes IR CVC tunneled (6/21/2022); CT guided percutaneous biopsy LYMPH node superficial  (11/18/2022); and CT guided percutaneous biopsy retroperitoneum (11/30/2023).    Medications:  Reviewed in EMR. See EMR for complete list of medications and doses.    Allergies:  Amoxicillin and Ceftriaxone    Social History:  - Tobacco:  reports that he has never smoked. He has been exposed to tobacco smoke. He has never used smokeless tobacco.   - Alcohol:  reports no history of alcohol use.   - Illicit Drugs:  reports no history of drug use.     ROS:  Per HPI.       ???????????????????????????????????????????????????????????????  Triage Vitals:  T 37.5 °C (99.5 °F)    /68  RR 20  O2 93 %      Physical Exam    General: Noticeably in pain  Head: Normocephalic. Atraumatic.   Neck: No meningismus. No midline cervical spine tenderness with palpation. FROM. No gross masses.   Eyes: EOMI. No scleral icterus or injection.   ENT: Moist mucous membranes, no apparent trauma or lesions.  CV: mild chest wall TTP. Regular rhythm. No murmurs, rubs, gallops appreciated. 2+ radial pulses bilaterally.  Resp: Clear to auscultation bilaterally. No respiratory distress.   GI: Soft, non-distended. Mild diffuse TTP. No rebound tenderness or guarding. No palpable masses.  : No suprapubic or CVA tenderness.   MSK: Full ROM in bilateral upper and lower extremities. No gross step offs or deformities.  EXT: No peripheral edema, contusions, or wounds.  Skin: Warm and dry, no rashes or lesions.  Neuro: No focal neurological deficits from stated baseline. Motor and sensation intact throughout. Speech fluent.  Psych: Appropriate mood and behavior, converses and responds appropriately.    ???????????????????????????????????????????????????????????????  Labs:   Labs Reviewed   CBC WITH AUTO DIFFERENTIAL   COMPREHENSIVE METABOLIC PANEL   RETICULOCYTES   LACTATE DEHYDROGENASE        Imaging:   XR chest 2 views    (Results Pending)        EKG:  Rate is 85, sinus rhythm, normal axis, no interval prolongation, no st elevation or  depression.  When compared to EKG on 12/19/23 review of EKG does not show any signs of STEMI, complete heart block, asystole, V-fib.      MDM:  Patient is a 23-year-old male with a past medical history of sickle cell disease (C/B splenic/sequestration, priapism, and acute chest syndrome), newly diagnosed Hodgkin lymphoma, LVH, and nocturnal hypoxia (intermittently on 2 L nasal cannula at home) who presented to [facility] ED for pain typical of her sickle cell pain crisis.  Patient's physical exam findings significant for muscle skeletal TTP consistent with the patient's typical sickle cell pain crisis.  Patient presented hypertensive and tachycardic likely secondary to pain.  Low clinical concern for acute chest, stroke, splenic sequestration, vascular necrosis, and acute limb ischemia.  Patient ordered and administered 3 doses of 1 mg Dilaudid every 30 minutes.  Patient requested Benadryl for itching.   Benadryl was ordered and administered. CXR did not display infiltrates or acute cardiopulmonary process.  Basic labs and hemolysis labs are unremarkable. Upon reevaluation, patient continued to note 7 out of 10 pain.  Discussed ED findings and admission with patient.  Patient stated understanding with the plan.  Upon discussion with the admitting provider, RAVI Walters, patient's attendance of his appointment with Dr. Stoll on 12/26/2023 must be in person and cannot be missed if treatment were to begin as soon as possible.  Patient noted that he could be discharged home after receiving further pain management in the emergency department.  Patient ordered administered 15 mg of oxycodone in the emergency department.  Marie Modi stated that she was prescribed oxycodone for the patient to  from his pharmacy.  Patient noted that he will require transportation to his appointments since it is in [place].  Consulted social work to discuss transportation options with the patient and his mother.   Patient noted significant relief of his pain after additional oxycodone and was in agreement with the plan to be discharged with additional analgesia prescribed as well as to follow-up at his outpatient oncology appointment tomorrow.  All questions were answered.  Patient discharged in stable condition.    ED Course:  Diagnoses as of 12/25/23 1406   Sickle cell pain crisis (CMS/HCC)       Social Determinants Limiting Care:      Disposition:  Discharge    Ramon Porras MD   Emergency Medicine PGY-2  [facility]    Comment: Please note this report has been produced using speech recognition software and may contain errors related to that system including errors in grammar, punctuation, and spelling as well as words and phrases that may be inappropriate.  If there are any questions or concerns please feel free to contact the dictating provider for clarification.    Procedures ? [employer] last updated 12/25/2023 8:55 AM        Ramon Porras MD  Resident  12/27/23 0741       Ramon Porras MD  Resident  12/27/23 0743

## 2023-12-25 NOTE — ED TRIAGE NOTES
Pt c/o sickle cell pain in his chest and abdomen. States this feels like his typical sickle cell pain. Was recently diagnosed with lymphoma.

## 2023-12-25 NOTE — DISCHARGE INSTRUCTIONS
Please follow-up with oncology at your appointment tomorrow at 9 AM.  Please follow-up with your primary sickle cell provider in 2 to 3 days.  Please return the emergency department for any new or worsening symptoms.

## 2023-12-26 ENCOUNTER — OFFICE VISIT (OUTPATIENT)
Dept: HEMATOLOGY/ONCOLOGY | Facility: CLINIC | Age: 23
End: 2023-12-26
Payer: COMMERCIAL

## 2023-12-26 ENCOUNTER — ANCILLARY PROCEDURE (OUTPATIENT)
Dept: EMERGENCY MEDICINE | Facility: HOSPITAL | Age: 23
End: 2023-12-26
Payer: COMMERCIAL

## 2023-12-26 ENCOUNTER — TELEPHONE (OUTPATIENT)
Dept: ADMISSION | Facility: HOSPITAL | Age: 23
End: 2023-12-26
Payer: COMMERCIAL

## 2023-12-26 ENCOUNTER — OFFICE VISIT (OUTPATIENT)
Dept: HEMATOLOGY/ONCOLOGY | Facility: HOSPITAL | Age: 23
End: 2023-12-26
Payer: COMMERCIAL

## 2023-12-26 VITALS
TEMPERATURE: 100.4 F | OXYGEN SATURATION: 90 % | HEIGHT: 73 IN | RESPIRATION RATE: 18 BRPM | WEIGHT: 158.1 LBS | SYSTOLIC BLOOD PRESSURE: 141 MMHG | HEART RATE: 86 BPM | BODY MASS INDEX: 20.95 KG/M2 | DIASTOLIC BLOOD PRESSURE: 68 MMHG

## 2023-12-26 VITALS
SYSTOLIC BLOOD PRESSURE: 147 MMHG | TEMPERATURE: 99.5 F | OXYGEN SATURATION: 85 % | WEIGHT: 160.5 LBS | BODY MASS INDEX: 20.61 KG/M2 | DIASTOLIC BLOOD PRESSURE: 74 MMHG | HEART RATE: 99 BPM | RESPIRATION RATE: 16 BRPM

## 2023-12-26 DIAGNOSIS — D57.00 SICKLE-CELL DISEASE WITH PAIN (MULTI): ICD-10-CM

## 2023-12-26 DIAGNOSIS — C81.03 NODULAR LYMPHOCYTE PREDOMINANT HODGKIN LYMPHOMA OF INTRA-ABDOMINAL LYMPH NODES (MULTI): Primary | Chronic | ICD-10-CM

## 2023-12-26 DIAGNOSIS — R59.1 LYMPHADENOPATHY: ICD-10-CM

## 2023-12-26 DIAGNOSIS — D57.00 SICKLE CELL DISEASE WITH CRISIS (MULTI): Primary | ICD-10-CM

## 2023-12-26 LAB — PATH REVIEW-CBC DIFFERENTIAL: NORMAL

## 2023-12-26 PROCEDURE — 99215 OFFICE O/P EST HI 40 MIN: CPT | Performed by: INTERNAL MEDICINE

## 2023-12-26 PROCEDURE — 3077F SYST BP >= 140 MM HG: CPT | Performed by: INTERNAL MEDICINE

## 2023-12-26 PROCEDURE — RXMED WILLOW AMBULATORY MEDICATION CHARGE

## 2023-12-26 PROCEDURE — 93005 ELECTROCARDIOGRAM TRACING: CPT

## 2023-12-26 PROCEDURE — 1036F TOBACCO NON-USER: CPT | Performed by: INTERNAL MEDICINE

## 2023-12-26 PROCEDURE — 3078F DIAST BP <80 MM HG: CPT | Performed by: INTERNAL MEDICINE

## 2023-12-26 RX ORDER — FOLIC ACID 1 MG/1
1 TABLET ORAL DAILY
Qty: 30 TABLET | Refills: 11 | Status: SHIPPED | OUTPATIENT
Start: 2023-12-26 | End: 2024-12-25

## 2023-12-26 RX ORDER — DIPHENHYDRAMINE HCL 50 MG
50 CAPSULE ORAL ONCE
Status: CANCELLED | OUTPATIENT
Start: 2024-01-18

## 2023-12-26 RX ORDER — EPINEPHRINE 0.3 MG/.3ML
0.3 INJECTION SUBCUTANEOUS EVERY 5 MIN PRN
Status: CANCELLED | OUTPATIENT
Start: 2024-01-18

## 2023-12-26 RX ORDER — PALONOSETRON 0.05 MG/ML
0.25 INJECTION, SOLUTION INTRAVENOUS ONCE
Status: CANCELLED | OUTPATIENT
Start: 2024-01-18

## 2023-12-26 RX ORDER — ALBUTEROL SULFATE 0.83 MG/ML
3 SOLUTION RESPIRATORY (INHALATION) AS NEEDED
Status: CANCELLED | OUTPATIENT
Start: 2024-01-18

## 2023-12-26 RX ORDER — DIPHENHYDRAMINE HYDROCHLORIDE 50 MG/ML
50 INJECTION INTRAMUSCULAR; INTRAVENOUS AS NEEDED
Status: CANCELLED | OUTPATIENT
Start: 2024-01-18

## 2023-12-26 RX ORDER — PROCHLORPERAZINE MALEATE 5 MG
10 TABLET ORAL EVERY 6 HOURS PRN
Status: CANCELLED | OUTPATIENT
Start: 2024-01-18

## 2023-12-26 RX ORDER — PSEUDOEPHEDRINE HCL 30 MG
30 TABLET ORAL NIGHTLY PRN
Qty: 30 TABLET | Refills: 2 | Status: SHIPPED | OUTPATIENT
Start: 2023-12-26 | End: 2024-02-28 | Stop reason: HOSPADM

## 2023-12-26 RX ORDER — ACETAMINOPHEN 325 MG/1
650 TABLET ORAL ONCE
Status: CANCELLED | OUTPATIENT
Start: 2024-01-18

## 2023-12-26 RX ORDER — DOXORUBICIN HYDROCHLORIDE 2 MG/ML
50 INJECTION, SOLUTION INTRAVENOUS ONCE
Status: CANCELLED | OUTPATIENT
Start: 2024-01-18

## 2023-12-26 RX ORDER — PROCHLORPERAZINE EDISYLATE 5 MG/ML
10 INJECTION INTRAMUSCULAR; INTRAVENOUS EVERY 6 HOURS PRN
Status: CANCELLED | OUTPATIENT
Start: 2024-01-18

## 2023-12-26 RX ORDER — FAMOTIDINE 10 MG/ML
20 INJECTION INTRAVENOUS ONCE AS NEEDED
Status: CANCELLED | OUTPATIENT
Start: 2024-01-18

## 2023-12-26 ASSESSMENT — PATIENT HEALTH QUESTIONNAIRE - PHQ9
SUM OF ALL RESPONSES TO PHQ9 QUESTIONS 1 AND 2: 0
2. FEELING DOWN, DEPRESSED OR HOPELESS: NOT AT ALL
1. LITTLE INTEREST OR PLEASURE IN DOING THINGS: NOT AT ALL

## 2023-12-26 ASSESSMENT — ENCOUNTER SYMPTOMS
OCCASIONAL FEELINGS OF UNSTEADINESS: 0
MUSCULOSKELETAL NEGATIVE: 1
DEPRESSION: 0
CARDIOVASCULAR NEGATIVE: 1
LOSS OF SENSATION IN FEET: 0
EYES NEGATIVE: 1
ENDOCRINE NEGATIVE: 1
CONSTITUTIONAL NEGATIVE: 1
HEMATOLOGIC/LYMPHATIC NEGATIVE: 1
PSYCHIATRIC NEGATIVE: 1
DEPRESSION: 0
ABDOMINAL PAIN: 1
LOSS OF SENSATION IN FEET: 0
OCCASIONAL FEELINGS OF UNSTEADINESS: 0
NEUROLOGICAL NEGATIVE: 1
RESPIRATORY NEGATIVE: 1

## 2023-12-26 ASSESSMENT — PAIN SCALES - GENERAL: PAINLEVEL: 6

## 2023-12-26 NOTE — PROGRESS NOTES
Patient ID: Joellen Narayan is a 23 y.o. male.  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 He came with his monther.  He was found to have retroperitoneal LN's and recent PET showed slight increase RPLN's in size, Interval development on multiple FDG avid focus within sternum, vertebral body, R acetabular wall and L femoral head. MRI C/T/L spine (12/20) slight decreased marrow signal throughout the spine consistent with chronic anemia in sickle cell disease. MRI Pelvis (12/20) T2 and FLAIR hyperintense signal of the R superior endplate of the L5 vertebral body, R superior acetabulum, and L femoral head corresponding to hypermetabolic lesions seen on PET that may represent osseous lymphomatous involvement. He underwent Bx per IR on 11/30/23 and path was c/w NLPHL(grade II) He denies recent night sweats, weight loss, n/v/d or abd pain. He also denies bowel/urinary problems.     For his sickle cell disease, he is not on hydroxyurea and presents often to ED for SS cirsis(last seen at ED on 12/24 and 12/25/23). He also has nocturnal hypoxia (given 2 L O2 per nasal cannula at home but he rarely uses it.      Social History  He reports that he has never smoked. He has been exposed to tobacco smoke. He has never used smokeless tobacco. He reports that he does not drink alcohol and does not use drugs. He has 3 siblings with sickle cell trait    FINAL DIAGNOSIS 11/30/23  A. LYMPH NODE, PARAAORTIC, CORE BIOPSY:      -- NODULAR LYMPHOCYTE PREDOMINANT HODGKIN LYMPHOMA (WHO 2022 CLASSIFICATION)/ NODULAR LYMPHOCYTE PREDOMINANT B CELL LYMPHOMA (ICC 2022   CLASSIFICATION) (NLPHL)            Review of Systems   All other systems reviewed and are negative.       Objective   BSA: 1.95 meters squared  /74 (BP Location: Right arm, Patient Position: Sitting)   Pulse 99   Temp 37.5 °C (99.5 °F) (Temporal)   Resp 16   Wt 72.8 kg (160 lb 7.9 oz) Comment: weighed patient twice.  SpO2 (!) 85% Comment: Patients mother states 85% is his baseline and he refuses to wear oxygen at home. RN notified  BMI 20.61 kg/m²     No family history on file.  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 He has been exposed to tobacco smoke. He has never used smokeless tobacco.  He  reports no history of alcohol use.  He  reports no history of drug use.    Physical Exam  HENT:      Head: Normocephalic.   Eyes:      Pupils: Pupils are equal, round, and reactive to light.   Cardiovascular:      Rate and Rhythm: Normal rate.      Pulses: Normal pulses.      Comments: 2/6 AMADEO  Pulmonary:      Effort: Pulmonary effort is normal.      Breath sounds: Normal breath sounds.   Abdominal:      General: Abdomen is flat.      Palpations: Abdomen is soft.   Musculoskeletal:         General: Normal range of motion.      Cervical back: Normal range of motion and neck supple.   Neurological:      General: No focal deficit present.      Mental Status: He is alert.     Performance Status:  Asymptomatic    Assessment/Plan    Patient is a 23-year-old male with a past medical history of sickle cell disease (C/B splenic/sequestration, priapism, and acute chest syndrome), and newly diagnosed nodular lymphocyte predominant Hodgkins  lymphoma(NLPHL)is here for further discussion and management. He came with his monther.    NLPHL  - echocardiogram  - Labs reviewed from yesterday  - elevated LDH partially from sickle cell disease  - chemotherapy was discussed(R-CHOP)  - consent obtianed    RTC after echo    Sickle cell anemia    Hypoxia   On home O2 but not compliant  - encouraged him to use-> if not he will have more frequent SS crisis         Melissa Stoll MD

## 2023-12-26 NOTE — LETTER
Date: 2023  RE:  Joellen Narayan  :  2000      To Whom It May Concern:    Our patient, Joellen, has been under our care and now may return back to work without restrictions.    Their return to work date is:  23    If you have questions concerning this patient's immediate care, please feel free to contact our office at [phone].    Sincerely,      Avila Nolasco RN

## 2023-12-26 NOTE — TELEPHONE ENCOUNTER
"This patient had a visit with Dr. Stoll at 9 at [facility], but had an apt with Renee Barrera 1030 at [facility], just finishing up at [place] now (1101am), wondering if they should head to [facility] for a visit with Renee late or if something can be rescheduled. Secure chat sent to Renee Barrera to advise.     Per Renee \"Hi, yes I will still see him. Thanks \"    Patient mother made aware and will head to [facility] for the visit  "
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
## 2023-12-28 ENCOUNTER — APPOINTMENT (OUTPATIENT)
Dept: HEMATOLOGY/ONCOLOGY | Facility: HOSPITAL | Age: 23
End: 2023-12-28
Payer: COMMERCIAL

## 2024-01-02 DIAGNOSIS — D57.00 SICKLE CELL DISEASE WITH CRISIS (MULTI): Primary | ICD-10-CM

## 2024-01-03 ENCOUNTER — SPECIALTY PHARMACY (OUTPATIENT)
Dept: PHARMACY | Facility: CLINIC | Age: 24
End: 2024-01-03

## 2024-01-03 ENCOUNTER — PHARMACY VISIT (OUTPATIENT)
Dept: PHARMACY | Facility: CLINIC | Age: 24
End: 2024-01-03
Payer: MEDICARE

## 2024-01-03 PROCEDURE — RXMED WILLOW AMBULATORY MEDICATION CHARGE

## 2024-01-04 ENCOUNTER — HOSPITAL ENCOUNTER (OUTPATIENT)
Dept: CARDIOLOGY | Facility: HOSPITAL | Age: 24
Discharge: HOME | End: 2024-01-04
Payer: COMMERCIAL

## 2024-01-04 DIAGNOSIS — C81.03 NODULAR LYMPHOCYTE PREDOMINANT HODGKIN LYMPHOMA OF INTRA-ABDOMINAL LYMPH NODES (MULTI): Chronic | ICD-10-CM

## 2024-01-04 DIAGNOSIS — Z01.818 ENCOUNTER FOR OTHER PREPROCEDURAL EXAMINATION: ICD-10-CM

## 2024-01-04 PROBLEM — K80.20 CHOLELITHIASIS WITHOUT OBSTRUCTION: Status: ACTIVE | Noted: 2023-11-27

## 2024-01-04 PROBLEM — I51.7 LEFT VENTRICULAR DILATION: Status: ACTIVE | Noted: 2024-01-04

## 2024-01-04 PROBLEM — J34.3 HYPERTROPHY OF NASAL TURBINATES: Status: ACTIVE | Noted: 2024-01-04

## 2024-01-04 PROBLEM — A56.19: Status: ACTIVE | Noted: 2023-08-08

## 2024-01-04 PROBLEM — R00.2 PALPITATIONS: Status: ACTIVE | Noted: 2024-01-04

## 2024-01-04 PROBLEM — L30.9 ECZEMA: Status: ACTIVE | Noted: 2023-02-20

## 2024-01-04 PROBLEM — I50.1 LEFT HEART FAILURE (MULTI): Status: ACTIVE | Noted: 2022-08-25

## 2024-01-04 PROBLEM — J35.2 ADENOID HYPERTROPHY: Status: ACTIVE | Noted: 2024-01-04

## 2024-01-04 PROBLEM — N45.3 ORCHITIS AND EPIDIDYMITIS: Status: ACTIVE | Noted: 2024-01-04

## 2024-01-04 PROBLEM — L01.00 IMPETIGO: Status: ACTIVE | Noted: 2024-01-04

## 2024-01-04 PROBLEM — D73.0 FUNCTIONAL ASPLENIA: Status: ACTIVE | Noted: 2024-01-04

## 2024-01-04 PROBLEM — D64.9 CHRONIC ANEMIA: Status: ACTIVE | Noted: 2024-01-04

## 2024-01-04 PROBLEM — G47.33 OBSTRUCTIVE SLEEP APNEA OF CHILD: Status: ACTIVE | Noted: 2024-01-04

## 2024-01-04 PROBLEM — H52.13 MYOPIA OF BOTH EYES WITH ASTIGMATISM: Status: ACTIVE | Noted: 2024-01-04

## 2024-01-04 PROBLEM — Q74.2 CONGENITAL ANOMALY OF FOOT: Status: ACTIVE | Noted: 2024-01-04

## 2024-01-04 PROBLEM — M41.9 SCOLIOSIS: Status: ACTIVE | Noted: 2024-01-04

## 2024-01-04 PROBLEM — G47.34 NOCTURNAL HYPOXIA: Status: ACTIVE | Noted: 2024-01-04

## 2024-01-04 PROBLEM — Q89.01 FUNCTIONAL ASPLENIA: Status: ACTIVE | Noted: 2024-01-04

## 2024-01-04 PROBLEM — I51.7 LVH (LEFT VENTRICULAR HYPERTROPHY): Status: ACTIVE | Noted: 2024-01-04

## 2024-01-04 PROBLEM — H52.203 MYOPIA OF BOTH EYES WITH ASTIGMATISM: Status: ACTIVE | Noted: 2024-01-04

## 2024-01-04 PROBLEM — D57.1: Status: ACTIVE | Noted: 2024-01-04

## 2024-01-04 PROBLEM — R42 DIZZINESS: Status: ACTIVE | Noted: 2024-01-04

## 2024-01-04 PROBLEM — K76.89: Status: ACTIVE | Noted: 2024-01-04

## 2024-01-04 PROBLEM — R01.1 HEART MURMUR: Status: ACTIVE | Noted: 2024-01-04

## 2024-01-04 PROBLEM — I10 HTN (HYPERTENSION): Status: ACTIVE | Noted: 2022-04-05

## 2024-01-04 PROBLEM — R26.89 GAIT, ANTALGIC: Status: ACTIVE | Noted: 2024-01-04

## 2024-01-04 PROBLEM — L03.90 CELLULITIS: Status: ACTIVE | Noted: 2024-01-04

## 2024-01-04 PROBLEM — R55 SYNCOPE: Status: ACTIVE | Noted: 2024-01-04

## 2024-01-04 PROBLEM — R59.1 LYMPHADENOPATHY: Status: ACTIVE | Noted: 2024-01-04

## 2024-01-04 PROBLEM — I51.7 LEFT ATRIAL DILATION: Status: ACTIVE | Noted: 2024-01-04

## 2024-01-04 PROBLEM — H34.9 RETINAL VASCULAR OCCLUSION: Status: ACTIVE | Noted: 2019-12-10

## 2024-01-04 PROBLEM — I51.7 CARDIOMEGALY: Status: ACTIVE | Noted: 2023-05-24

## 2024-01-04 PROCEDURE — 93306 TTE W/DOPPLER COMPLETE: CPT | Performed by: SPECIALIST

## 2024-01-04 PROCEDURE — 93356 MYOCRD STRAIN IMG SPCKL TRCK: CPT | Performed by: SPECIALIST

## 2024-01-04 PROCEDURE — 76376 3D RENDER W/INTRP POSTPROCES: CPT | Performed by: SPECIALIST

## 2024-01-04 PROCEDURE — 76376 3D RENDER W/INTRP POSTPROCES: CPT

## 2024-01-05 LAB
AORTIC VALVE PEAK VELOCITY: 1.58
AV PEAK GRADIENT: 9.9
AVA (PEAK VEL): 3.64
EJECTION FRACTION APICAL 4 CHAMBER: 59.3
EJECTION FRACTION: 59
LEFT ATRIUM VOLUME AREA LENGTH INDEX BSA: 39.7
LEFT VENTRICLE INTERNAL DIMENSION DIASTOLE: 5.96 (ref 3.5–6)
LEFT VENTRICULAR OUTFLOW TRACT DIAMETER: 2.35
MITRAL VALVE E/A RATIO: 1.82
MITRAL VALVE E/E' RATIO: 6.18
RIGHT VENTRICLE FREE WALL PEAK S': 15
RIGHT VENTRICLE PEAK SYSTOLIC PRESSURE: 21.5
TRICUSPID ANNULAR PLANE SYSTOLIC EXCURSION: 2.4

## 2024-01-09 PROBLEM — C81.70: Status: ACTIVE | Noted: 2023-12-19

## 2024-01-09 PROBLEM — R60.0 LOCALIZED EDEMA: Status: ACTIVE | Noted: 2023-12-19

## 2024-01-09 RX ORDER — CLINDAMYCIN HYDROCHLORIDE 300 MG/1
1 CAPSULE ORAL
COMMUNITY
Start: 2023-02-20 | End: 2024-02-12 | Stop reason: WASHOUT

## 2024-01-09 RX ORDER — DESMOPRESSIN ACETATE 0.2 MG/1
TABLET ORAL
COMMUNITY
Start: 2015-11-09 | End: 2024-02-12 | Stop reason: WASHOUT

## 2024-01-09 RX ORDER — AMOXICILLIN 250 MG
2 CAPSULE ORAL EVERY 12 HOURS
COMMUNITY
Start: 2023-11-28

## 2024-01-09 RX ORDER — PREDNISONE 20 MG/1
TABLET ORAL
COMMUNITY
Start: 2023-02-20 | End: 2024-02-12 | Stop reason: WASHOUT

## 2024-01-09 RX ORDER — MOMETASONE FUROATE 1 MG/G
15 OINTMENT TOPICAL
COMMUNITY
Start: 2015-07-14 | End: 2024-02-12 | Stop reason: WASHOUT

## 2024-01-09 RX ORDER — IBUPROFEN 600 MG/1
TABLET ORAL
Status: ON HOLD | COMMUNITY
End: 2024-02-12 | Stop reason: ALTCHOICE

## 2024-01-09 RX ORDER — ACETAMINOPHEN 500 MG
5 TABLET ORAL DAILY PRN
Status: ON HOLD | COMMUNITY
Start: 2023-12-22 | End: 2024-02-12 | Stop reason: ALTCHOICE

## 2024-01-09 RX ORDER — ENOXAPARIN SODIUM 100 MG/ML
40 INJECTION SUBCUTANEOUS
COMMUNITY
Start: 2023-11-28 | End: 2024-02-12

## 2024-01-09 RX ORDER — DOXYCYCLINE HYCLATE 100 MG
100 TABLET ORAL 2 TIMES DAILY
COMMUNITY
Start: 2023-08-08 | End: 2024-02-12 | Stop reason: WASHOUT

## 2024-01-09 RX ORDER — AMOXICILLIN AND CLAVULANATE POTASSIUM 875; 125 MG/1; MG/1
1 TABLET, FILM COATED ORAL 2 TIMES DAILY
COMMUNITY
Start: 2023-02-16 | End: 2024-02-12 | Stop reason: WASHOUT

## 2024-01-09 RX ORDER — OMEPRAZOLE 40 MG/1
CAPSULE, DELAYED RELEASE ORAL EVERY 24 HOURS
COMMUNITY
Start: 2022-06-02 | End: 2024-02-12 | Stop reason: WASHOUT

## 2024-01-09 RX ORDER — PANTOPRAZOLE SODIUM 40 MG/1
40 TABLET, DELAYED RELEASE ORAL
Status: ON HOLD | COMMUNITY
Start: 2023-11-28 | End: 2024-02-12 | Stop reason: WASHOUT

## 2024-01-09 RX ORDER — NAPROXEN 250 MG/1
TABLET ORAL
COMMUNITY
Start: 2023-03-19 | End: 2024-02-12 | Stop reason: WASHOUT

## 2024-01-09 RX ORDER — MULTIVITAMIN WITH IRON
TABLET ORAL
COMMUNITY
Start: 2015-05-06 | End: 2024-02-12 | Stop reason: WASHOUT

## 2024-01-09 RX ORDER — POLYETHYLENE GLYCOL 3350 17 G/17G
17 POWDER, FOR SOLUTION ORAL
COMMUNITY
Start: 2023-11-28

## 2024-01-09 RX ORDER — DICLOFENAC SODIUM 10 MG/G
4 GEL TOPICAL EVERY 6 HOURS PRN
COMMUNITY
Start: 2023-12-21 | End: 2024-02-12 | Stop reason: WASHOUT

## 2024-01-09 RX ORDER — NALOXONE HYDROCHLORIDE 0.4 MG/ML
0.2 INJECTION, SOLUTION INTRAMUSCULAR; INTRAVENOUS; SUBCUTANEOUS
Status: ON HOLD | COMMUNITY
Start: 2023-12-21 | End: 2024-02-12 | Stop reason: WASHOUT

## 2024-01-18 ENCOUNTER — OFFICE VISIT (OUTPATIENT)
Dept: HEMATOLOGY/ONCOLOGY | Facility: HOSPITAL | Age: 24
End: 2024-01-18
Payer: COMMERCIAL

## 2024-01-18 ENCOUNTER — LAB (OUTPATIENT)
Dept: LAB | Facility: HOSPITAL | Age: 24
End: 2024-01-18
Payer: COMMERCIAL

## 2024-01-18 ENCOUNTER — INFUSION (OUTPATIENT)
Dept: HEMATOLOGY/ONCOLOGY | Facility: HOSPITAL | Age: 24
End: 2024-01-18
Payer: COMMERCIAL

## 2024-01-18 VITALS
HEIGHT: 73 IN | WEIGHT: 159.1 LBS | RESPIRATION RATE: 16 BRPM | OXYGEN SATURATION: 95 % | HEART RATE: 74 BPM | BODY MASS INDEX: 21.09 KG/M2 | TEMPERATURE: 98.6 F | SYSTOLIC BLOOD PRESSURE: 132 MMHG | DIASTOLIC BLOOD PRESSURE: 59 MMHG

## 2024-01-18 VITALS
HEART RATE: 82 BPM | OXYGEN SATURATION: 94 % | DIASTOLIC BLOOD PRESSURE: 60 MMHG | RESPIRATION RATE: 18 BRPM | SYSTOLIC BLOOD PRESSURE: 130 MMHG

## 2024-01-18 DIAGNOSIS — C81.03 NODULAR LYMPHOCYTE PREDOMINANT HODGKIN LYMPHOMA OF INTRA-ABDOMINAL LYMPH NODES (MULTI): Chronic | ICD-10-CM

## 2024-01-18 DIAGNOSIS — C81.03 NODULAR LYMPHOCYTE PREDOMINANT HODGKIN LYMPHOMA OF INTRA-ABDOMINAL LYMPH NODES (MULTI): ICD-10-CM

## 2024-01-18 LAB
ALBUMIN SERPL BCP-MCNC: 4.9 G/DL (ref 3.4–5)
ALP SERPL-CCNC: 73 U/L (ref 33–120)
ALT SERPL W P-5'-P-CCNC: 18 U/L (ref 10–52)
ANION GAP SERPL CALC-SCNC: 12 MMOL/L (ref 10–20)
AST SERPL W P-5'-P-CCNC: 39 U/L (ref 9–39)
BASOPHILS # BLD AUTO: 0.08 X10*3/UL (ref 0–0.1)
BASOPHILS NFR BLD AUTO: 1 %
BILIRUB SERPL-MCNC: 10.1 MG/DL (ref 0–1.2)
BUN SERPL-MCNC: 6 MG/DL (ref 6–23)
CALCIUM SERPL-MCNC: 9.5 MG/DL (ref 8.6–10.3)
CHLORIDE SERPL-SCNC: 106 MMOL/L (ref 98–107)
CO2 SERPL-SCNC: 25 MMOL/L (ref 21–32)
CREAT SERPL-MCNC: 0.63 MG/DL (ref 0.5–1.3)
EGFRCR SERPLBLD CKD-EPI 2021: >90 ML/MIN/1.73M*2
EOSINOPHIL # BLD AUTO: 0.23 X10*3/UL (ref 0–0.7)
EOSINOPHIL NFR BLD AUTO: 2.7 %
ERYTHROCYTE [DISTWIDTH] IN BLOOD BY AUTOMATED COUNT: 25.9 % (ref 11.5–14.5)
GLUCOSE SERPL-MCNC: 82 MG/DL (ref 74–99)
HBV CORE AB SER QL: NONREACTIVE
HBV SURFACE AB SER-ACNC: <3.1 MIU/ML
HBV SURFACE AG SERPL QL IA: NONREACTIVE
HCT VFR BLD AUTO: 23.9 % (ref 41–52)
HGB BLD-MCNC: 8.7 G/DL (ref 13.5–17.5)
IMM GRANULOCYTES # BLD AUTO: 0.04 X10*3/UL (ref 0–0.7)
IMM GRANULOCYTES NFR BLD AUTO: 0.5 % (ref 0–0.9)
LDH SERPL L TO P-CCNC: 475 U/L (ref 84–246)
LYMPHOCYTES # BLD AUTO: 3.28 X10*3/UL (ref 1.2–4.8)
LYMPHOCYTES NFR BLD AUTO: 39 %
MCH RBC QN AUTO: 28.4 PG (ref 26–34)
MCHC RBC AUTO-ENTMCNC: 36.4 G/DL (ref 32–36)
MCV RBC AUTO: 78 FL (ref 80–100)
MONOCYTES # BLD AUTO: 1.08 X10*3/UL (ref 0.1–1)
MONOCYTES NFR BLD AUTO: 12.8 %
NEUTROPHILS # BLD AUTO: 3.71 X10*3/UL (ref 1.2–7.7)
NEUTROPHILS NFR BLD AUTO: 44 %
NRBC BLD-RTO: 0.5 /100 WBCS (ref 0–0)
PAPPENHEIMER BOD BLD QL SMEAR: PRESENT
PLATELET # BLD AUTO: 323 X10*3/UL (ref 150–450)
POLYCHROMASIA BLD QL SMEAR: NORMAL
POTASSIUM SERPL-SCNC: 4.1 MMOL/L (ref 3.5–5.3)
PROT SERPL-MCNC: 7.4 G/DL (ref 6.4–8.2)
RBC # BLD AUTO: 3.06 X10*6/UL (ref 4.5–5.9)
RBC MORPH BLD: NORMAL
SCHISTOCYTES BLD QL SMEAR: NORMAL
SICKLE CELLS BLD QL SMEAR: NORMAL
SODIUM SERPL-SCNC: 139 MMOL/L (ref 136–145)
TARGETS BLD QL SMEAR: NORMAL
URATE SERPL-MCNC: 4.7 MG/DL (ref 4–7.5)
WBC # BLD AUTO: 8.4 X10*3/UL (ref 4.4–11.3)

## 2024-01-18 PROCEDURE — 2500000004 HC RX 250 GENERAL PHARMACY W/ HCPCS (ALT 636 FOR OP/ED): Performed by: INTERNAL MEDICINE

## 2024-01-18 PROCEDURE — 99215 OFFICE O/P EST HI 40 MIN: CPT | Performed by: INTERNAL MEDICINE

## 2024-01-18 PROCEDURE — 80053 COMPREHEN METABOLIC PANEL: CPT

## 2024-01-18 PROCEDURE — 96375 TX/PRO/DX INJ NEW DRUG ADDON: CPT | Mod: INF

## 2024-01-18 PROCEDURE — 85025 COMPLETE CBC W/AUTO DIFF WBC: CPT

## 2024-01-18 PROCEDURE — 2500000001 HC RX 250 WO HCPCS SELF ADMINISTERED DRUGS (ALT 637 FOR MEDICARE OP): Performed by: INTERNAL MEDICINE

## 2024-01-18 PROCEDURE — 86706 HEP B SURFACE ANTIBODY: CPT

## 2024-01-18 PROCEDURE — 96413 CHEMO IV INFUSION 1 HR: CPT

## 2024-01-18 PROCEDURE — 3078F DIAST BP <80 MM HG: CPT | Performed by: INTERNAL MEDICINE

## 2024-01-18 PROCEDURE — 86704 HEP B CORE ANTIBODY TOTAL: CPT

## 2024-01-18 PROCEDURE — 1036F TOBACCO NON-USER: CPT | Performed by: INTERNAL MEDICINE

## 2024-01-18 PROCEDURE — 96415 CHEMO IV INFUSION ADDL HR: CPT

## 2024-01-18 PROCEDURE — 87340 HEPATITIS B SURFACE AG IA: CPT

## 2024-01-18 PROCEDURE — 83615 LACTATE (LD) (LDH) ENZYME: CPT

## 2024-01-18 PROCEDURE — 36415 COLL VENOUS BLD VENIPUNCTURE: CPT

## 2024-01-18 PROCEDURE — RXMED WILLOW AMBULATORY MEDICATION CHARGE

## 2024-01-18 PROCEDURE — 84550 ASSAY OF BLOOD/URIC ACID: CPT

## 2024-01-18 PROCEDURE — 3075F SYST BP GE 130 - 139MM HG: CPT | Performed by: INTERNAL MEDICINE

## 2024-01-18 RX ORDER — PREDNISONE 50 MG/1
TABLET ORAL
Qty: 10 TABLET | Refills: 5 | Status: SHIPPED | OUTPATIENT
Start: 2024-01-18 | End: 2024-06-10 | Stop reason: WASHOUT

## 2024-01-18 RX ORDER — HEPARIN 100 UNIT/ML
500 SYRINGE INTRAVENOUS AS NEEDED
Status: CANCELLED | OUTPATIENT
Start: 2024-01-18

## 2024-01-18 RX ORDER — HEPARIN SODIUM,PORCINE/PF 10 UNIT/ML
50 SYRINGE (ML) INTRAVENOUS AS NEEDED
Status: CANCELLED | OUTPATIENT
Start: 2024-01-18

## 2024-01-18 RX ORDER — PROCHLORPERAZINE MALEATE 10 MG
10 TABLET ORAL EVERY 6 HOURS PRN
Qty: 30 TABLET | Refills: 5 | Status: SHIPPED | OUTPATIENT
Start: 2024-01-18 | End: 2024-06-10 | Stop reason: WASHOUT

## 2024-01-18 RX ORDER — EPINEPHRINE 0.3 MG/.3ML
0.3 INJECTION SUBCUTANEOUS EVERY 5 MIN PRN
Status: DISCONTINUED | OUTPATIENT
Start: 2024-01-18 | End: 2024-01-18 | Stop reason: HOSPADM

## 2024-01-18 RX ORDER — DIPHENHYDRAMINE HCL 50 MG
50 CAPSULE ORAL ONCE
Status: COMPLETED | OUTPATIENT
Start: 2024-01-18 | End: 2024-01-18

## 2024-01-18 RX ORDER — PREDNISONE 50 MG/1
100 TABLET ORAL DAILY
Qty: 10 TABLET | Refills: 5 | Status: SHIPPED | OUTPATIENT
Start: 2024-01-18 | End: 2024-01-23

## 2024-01-18 RX ORDER — FAMOTIDINE 10 MG/ML
20 INJECTION INTRAVENOUS ONCE AS NEEDED
Status: COMPLETED | OUTPATIENT
Start: 2024-01-18 | End: 2024-01-18

## 2024-01-18 RX ORDER — ALLOPURINOL 300 MG/1
300 TABLET ORAL DAILY
Qty: 30 TABLET | Refills: 0 | Status: SHIPPED | OUTPATIENT
Start: 2024-01-18 | End: 2024-02-21

## 2024-01-18 RX ORDER — ONDANSETRON HYDROCHLORIDE 8 MG/1
8 TABLET, FILM COATED ORAL EVERY 8 HOURS PRN
Qty: 30 TABLET | Refills: 5 | Status: SHIPPED | OUTPATIENT
Start: 2024-01-18 | End: 2024-06-10 | Stop reason: WASHOUT

## 2024-01-18 RX ORDER — DIPHENHYDRAMINE HYDROCHLORIDE 50 MG/ML
50 INJECTION INTRAMUSCULAR; INTRAVENOUS AS NEEDED
Status: COMPLETED | OUTPATIENT
Start: 2024-01-18 | End: 2024-01-18

## 2024-01-18 RX ORDER — ALBUTEROL SULFATE 0.83 MG/ML
3 SOLUTION RESPIRATORY (INHALATION) AS NEEDED
Status: DISCONTINUED | OUTPATIENT
Start: 2024-01-18 | End: 2024-01-18 | Stop reason: HOSPADM

## 2024-01-18 RX ORDER — ACETAMINOPHEN 325 MG/1
650 TABLET ORAL ONCE
Status: COMPLETED | OUTPATIENT
Start: 2024-01-18 | End: 2024-01-18

## 2024-01-18 RX ORDER — OLANZAPINE 5 MG/1
5 TABLET ORAL NIGHTLY
Qty: 4 TABLET | Refills: 5 | Status: SHIPPED | OUTPATIENT
Start: 2024-01-18 | End: 2024-06-10 | Stop reason: WASHOUT

## 2024-01-18 RX ADMIN — DIPHENHYDRAMINE HYDROCHLORIDE 50 MG: 50 INJECTION, SOLUTION INTRAMUSCULAR; INTRAVENOUS at 16:40

## 2024-01-18 RX ADMIN — DIPHENHYDRAMINE HYDROCHLORIDE 50 MG: 50 CAPSULE ORAL at 12:01

## 2024-01-18 RX ADMIN — ACETAMINOPHEN 650 MG: 325 TABLET ORAL at 12:01

## 2024-01-18 RX ADMIN — METHYLPREDNISOLONE SODIUM SUCCINATE 40 MG: 40 INJECTION, POWDER, FOR SOLUTION INTRAMUSCULAR; INTRAVENOUS at 16:38

## 2024-01-18 RX ADMIN — SODIUM CHLORIDE 500 ML: 9 INJECTION, SOLUTION INTRAVENOUS at 16:38

## 2024-01-18 RX ADMIN — SODIUM CHLORIDE 700 MG: 9 INJECTION, SOLUTION INTRAVENOUS at 13:10

## 2024-01-18 RX ADMIN — FAMOTIDINE 20 MG: 10 INJECTION INTRAVENOUS at 16:38

## 2024-01-18 ASSESSMENT — ENCOUNTER SYMPTOMS
OCCASIONAL FEELINGS OF UNSTEADINESS: 0
DEPRESSION: 0
LOSS OF SENSATION IN FEET: 0

## 2024-01-18 ASSESSMENT — PAIN SCALES - GENERAL: PAINLEVEL: 0-NO PAIN

## 2024-01-18 NOTE — PROGRESS NOTES
prednisonePatient ID: Joellen Narayan is a 23 y.o. male.  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 He came with his monther.  12/16/23: He was found to have retroperitoneal LN's and recent PET showed slight increase RPLN's in size, Interval development on multiple FDG avid focus within sternum, vertebral body, R acetabular wall and L femoral head. MRI C/T/L spine (12/20) slight decreased marrow signal throughout the spine consistent with chronic anemia in sickle cell disease. MRI Pelvis (12/20) T2 and FLAIR hyperintense signal of the R superior endplate of the L5 vertebral body, R superior acetabulum, and L femoral head corresponding to hypermetabolic lesions seen on PET that may represent osseous lymphomatous involvement. He underwent Bx per IR on 11/30/23 and path was c/w NLPHL(grade II) He denies recent night sweats, weight loss, n/v/d or abd pain. He also denies bowel/urinary problems.   1/18/24: here for chemotherapy. Since last visit, he was seen 2 x at ED with sickle cell crisis. Doing ok since.     For his sickle cell disease, he is not on hydroxyurea and presents often to ED for SS cirsis(last seen at ED on 12/24 and 12/25/23). He also has nocturnal hypoxia (given 2 L O2 per nasal cannula at home but he rarely uses it.      Social History  He reports that he has never smoked. He has been exposed to tobacco smoke. He has never used smokeless tobacco. He reports that he does not drink alcohol and does not use drugs. He has 3 siblings with sickle cell trait    FINAL DIAGNOSIS 11/30/23  A. LYMPH NODE, PARAAORTIC, CORE BIOPSY:      -- NODULAR  "LYMPHOCYTE PREDOMINANT HODGKIN LYMPHOMA (WHO 2022 CLASSIFICATION)/ NODULAR LYMPHOCYTE PREDOMINANT B CELL LYMPHOMA (ICC 2022  CLASSIFICATION) (NLPHL)          Review of Systems   All other systems reviewed and are negative.       Objective   BSA: 1.93 meters squared  /59 (BP Location: Left arm, Patient Position: Sitting, BP Cuff Size: Large adult)   Pulse 74   Temp 37 °C (98.6 °F) (Temporal)   Resp 16   Ht (S) 1.864 m (6' 1.39\")   Wt 72.2 kg (159 lb 1.6 oz)   SpO2 95%   BMI 20.77 kg/m²     No family history on file.  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 He has been exposed to tobacco smoke. He has never used smokeless tobacco.  He  reports no history of alcohol use.  He  reports no history of drug use.    Physical Exam  HENT:      Head: Normocephalic.   Eyes:      Pupils: Pupils are equal, round, and reactive to light.   Cardiovascular:      Rate and Rhythm: Normal rate.      Pulses: Normal pulses.      Comments: 2/6 AMADEO  Pulmonary:      Effort: Pulmonary effort is normal.      Breath sounds: Normal breath sounds.   Abdominal:      General: Abdomen is flat.      Palpations: Abdomen is soft.   Musculoskeletal:         General: Normal range of motion.      Cervical back: Normal range of motion and neck supple.   Neurological:      General: No focal deficit present.      Mental Status: He is alert.     Performance Status:  Asymptomatic    Assessment/Plan    Patient is a 23-year-old male with a past medical history of sickle cell disease (C/B splenic/sequestration, priapism, and acute chest syndrome), and newly diagnosed nodular " lymphocyte predominant Hodgkins lymphoma(NLPHL)is here for further discussion and management. He came with his monther.    NLPHL  - echocardiogram reviewed with patient  - elevated LDH partially from sickle cell disease  - chemotherapy was discussed(R-CHOP).-> we decided to simplify his chemotherapy to Rituxan and prdnisone q 3 weeks mainly due to frequent sickle cell crisis  - first cycle chemo today.    Sickle cell anemia    Hypoxia   On home O2 but not compliant  - encouraged him to use-> if not he will have more frequent SS crisis     [facility] 3 weeks    Melissa Stoll MD

## 2024-01-18 NOTE — LETTER
January 18, 2024     Patient: Joellen Narayan   YOB: 2000   Date of Visit: 1/18/2024       To Whom It May Concern:    Joellen Narayan was seen in my clinic on 1/18/2024 at 8:30 am. Please excuse his mother Melissa Narayan  for her absence from work on this day to make the appointment.    If you have any questions or concerns, please don't hesitate to call.         Sincerely,         Dorota Ronquillo RN

## 2024-01-18 NOTE — PROGRESS NOTES
Adverse Event Note     Name:Joellen Narayan  : 2000  MRN: [id number]      Adverse Event:  Medication: Rituxan  Administered Date/Time: 2023@1635  Reactions/Symptoms Started Time: 1635   Symptoms: (check all that apply)   [] Back Pain   [] Erythema Face     [] Hypotension     [] Rash/Rigors [x] Other   [] Bleeding    [] Erythema Hands  [] Itching  [] Swelling/Edema [] Unknown   [] Chest Pain [] Hives/Urticaria     [] Low platelet Ct  [] Syncope   [] Cytopenia  [] Hypertension       [] Neutropenia    Severity: Mild   Provider Notified: Yes   Medications Given(Add all medications to the chart via , or treatment plan)   [] Acetaminophen-Tylenol       [x] Famotidine-Pepcid  [] Nitroglycerine-NTG   [] Albuterol                               [] Hydrocortisone       [] Ondansetron-Zofran   [x] Diphenhydramine-Benadryl  [] Lorazepam-Ativan  [] Naloxone-Narcan   [] Epinephrine-Epi                     [x] Methylprednisone   Additional Details/ Comments:     1635 Infusion stopped pt C/O HA, double vision and feeling hot. NS bolus started. Reaction medications given. SANDRA Lomax @ bedside. Message sent to Dr Gutierrez.

## 2024-01-22 ENCOUNTER — PHARMACY VISIT (OUTPATIENT)
Dept: PHARMACY | Facility: CLINIC | Age: 24
End: 2024-01-22
Payer: MEDICARE

## 2024-01-23 ENCOUNTER — OFFICE VISIT (OUTPATIENT)
Dept: HEMATOLOGY/ONCOLOGY | Facility: HOSPITAL | Age: 24
End: 2024-01-23
Payer: COMMERCIAL

## 2024-01-23 VITALS
BODY MASS INDEX: 21.04 KG/M2 | HEART RATE: 67 BPM | OXYGEN SATURATION: 93 % | RESPIRATION RATE: 16 BRPM | TEMPERATURE: 97.3 F | SYSTOLIC BLOOD PRESSURE: 138 MMHG | DIASTOLIC BLOOD PRESSURE: 71 MMHG | WEIGHT: 161.2 LBS

## 2024-01-23 DIAGNOSIS — D57.00 SICKLE CELL DISEASE WITH CRISIS (MULTI): ICD-10-CM

## 2024-01-23 DIAGNOSIS — D57.00 SICKLE CELL PAIN CRISIS (MULTI): ICD-10-CM

## 2024-01-23 PROCEDURE — 3075F SYST BP GE 130 - 139MM HG: CPT | Performed by: INTERNAL MEDICINE

## 2024-01-23 PROCEDURE — 99213 OFFICE O/P EST LOW 20 MIN: CPT | Performed by: INTERNAL MEDICINE

## 2024-01-23 PROCEDURE — 1036F TOBACCO NON-USER: CPT | Performed by: INTERNAL MEDICINE

## 2024-01-23 PROCEDURE — 3078F DIAST BP <80 MM HG: CPT | Performed by: INTERNAL MEDICINE

## 2024-01-23 ASSESSMENT — ENCOUNTER SYMPTOMS
RESPIRATORY NEGATIVE: 1
DEPRESSION: 0
ABDOMINAL PAIN: 1
EYES NEGATIVE: 1
LOSS OF SENSATION IN FEET: 0
OCCASIONAL FEELINGS OF UNSTEADINESS: 0
NEUROLOGICAL NEGATIVE: 1
PSYCHIATRIC NEGATIVE: 1
ENDOCRINE NEGATIVE: 1
MUSCULOSKELETAL NEGATIVE: 1
CONSTITUTIONAL NEGATIVE: 1
CARDIOVASCULAR NEGATIVE: 1
HEMATOLOGIC/LYMPHATIC NEGATIVE: 1

## 2024-01-23 ASSESSMENT — PAIN SCALES - GENERAL: PAINLEVEL: 0-NO PAIN

## 2024-01-23 NOTE — LETTER
Joellen Narayan was seen in clinic and okay to return to work on 1/24/24.           Renee Barrera
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
## 2024-01-24 ENCOUNTER — SOCIAL WORK (OUTPATIENT)
Dept: HEMATOLOGY/ONCOLOGY | Facility: HOSPITAL | Age: 24
End: 2024-01-24
Payer: COMMERCIAL

## 2024-01-25 ENCOUNTER — APPOINTMENT (OUTPATIENT)
Dept: GASTROENTEROLOGY | Facility: HOSPITAL | Age: 24
End: 2024-01-25
Payer: COMMERCIAL

## 2024-01-25 ENCOUNTER — TELEPHONE (OUTPATIENT)
Dept: HEMATOLOGY/ONCOLOGY | Facility: HOSPITAL | Age: 24
End: 2024-01-25

## 2024-01-26 NOTE — PROGRESS NOTES
FLMA completed for patient's mother, Bill Narayan  by Renee Barrera CNP. Forms were submitted via fax to [employer] ([phone]).    LISW received follow up from [employer] requesting additional information on documentation. Information provided on 1/26/24 via fax.     LISW will continue to follow as needed.

## 2024-01-28 ENCOUNTER — CLINICAL SUPPORT (OUTPATIENT)
Dept: EMERGENCY MEDICINE | Facility: HOSPITAL | Age: 24
End: 2024-01-28
Payer: COMMERCIAL

## 2024-01-28 ENCOUNTER — APPOINTMENT (OUTPATIENT)
Dept: RADIOLOGY | Facility: HOSPITAL | Age: 24
End: 2024-01-28
Payer: COMMERCIAL

## 2024-01-28 ENCOUNTER — HOSPITAL ENCOUNTER (EMERGENCY)
Facility: HOSPITAL | Age: 24
Discharge: HOME | End: 2024-01-28
Attending: EMERGENCY MEDICINE
Payer: COMMERCIAL

## 2024-01-28 VITALS
HEART RATE: 71 BPM | OXYGEN SATURATION: 99 % | RESPIRATION RATE: 18 BRPM | SYSTOLIC BLOOD PRESSURE: 144 MMHG | DIASTOLIC BLOOD PRESSURE: 89 MMHG | HEIGHT: 73 IN | WEIGHT: 161 LBS | BODY MASS INDEX: 21.34 KG/M2 | TEMPERATURE: 99 F

## 2024-01-28 DIAGNOSIS — D57.00 SICKLE CELL PAIN CRISIS (MULTI): Primary | ICD-10-CM

## 2024-01-28 DIAGNOSIS — M79.632 PAIN OF LEFT FOREARM: ICD-10-CM

## 2024-01-28 LAB
ALBUMIN SERPL BCP-MCNC: 4.6 G/DL (ref 3.4–5)
ALP SERPL-CCNC: 65 U/L (ref 33–120)
ALT SERPL W P-5'-P-CCNC: 47 U/L (ref 10–52)
ANION GAP SERPL CALC-SCNC: 13 MMOL/L (ref 10–20)
AST SERPL W P-5'-P-CCNC: 55 U/L (ref 9–39)
ATRIAL RATE: 71 BPM
ATRIAL RATE: 75 BPM
BASO STIPL BLD QL SMEAR: PRESENT
BASOPHILS # BLD MANUAL: 0 X10*3/UL (ref 0–0.1)
BASOPHILS NFR BLD MANUAL: 0 %
BILIRUB SERPL-MCNC: 6.3 MG/DL (ref 0–1.2)
BUN SERPL-MCNC: 7 MG/DL (ref 6–23)
CALCIUM SERPL-MCNC: 9.3 MG/DL (ref 8.6–10.6)
CHLORIDE SERPL-SCNC: 108 MMOL/L (ref 98–107)
CO2 SERPL-SCNC: 25 MMOL/L (ref 21–32)
CREAT SERPL-MCNC: 0.52 MG/DL (ref 0.5–1.3)
EGFRCR SERPLBLD CKD-EPI 2021: >90 ML/MIN/1.73M*2
EOSINOPHIL # BLD MANUAL: 0.22 X10*3/UL (ref 0–0.7)
EOSINOPHIL NFR BLD MANUAL: 1.6 %
ERYTHROCYTE [DISTWIDTH] IN BLOOD BY AUTOMATED COUNT: 24 % (ref 11.5–14.5)
GLUCOSE SERPL-MCNC: 99 MG/DL (ref 74–99)
HCT VFR BLD AUTO: 23.6 % (ref 41–52)
HGB BLD-MCNC: 8.4 G/DL (ref 13.5–17.5)
HGB RETIC QN: 27 PG (ref 28–38)
HYPOCHROMIA BLD QL SMEAR: ABNORMAL
IMM GRANULOCYTES # BLD AUTO: 0.05 X10*3/UL (ref 0–0.7)
IMM GRANULOCYTES NFR BLD AUTO: 0.4 % (ref 0–0.9)
IMMATURE RETIC FRACTION: 37.7 %
LDH SERPL L TO P-CCNC: 339 U/L (ref 84–246)
LYMPHOCYTES # BLD MANUAL: 4.49 X10*3/UL (ref 1.2–4.8)
LYMPHOCYTES NFR BLD MANUAL: 32.8 %
MCH RBC QN AUTO: 27.5 PG (ref 26–34)
MCHC RBC AUTO-ENTMCNC: 35.6 G/DL (ref 32–36)
MCV RBC AUTO: 77 FL (ref 80–100)
MONOCYTES # BLD MANUAL: 0.42 X10*3/UL (ref 0.1–1)
MONOCYTES NFR BLD MANUAL: 3.1 %
NEUTS SEG # BLD MANUAL: 8.56 X10*3/UL (ref 1.2–7)
NEUTS SEG NFR BLD MANUAL: 62.5 %
NRBC BLD-RTO: 3.4 /100 WBCS (ref 0–0)
OVALOCYTES BLD QL SMEAR: ABNORMAL
P AXIS: -86 DEGREES
P AXIS: 72 DEGREES
P OFFSET: 177 MS
P OFFSET: 197 MS
P ONSET: 123 MS
P ONSET: 138 MS
PLATELET # BLD AUTO: 371 X10*3/UL (ref 150–450)
POLYCHROMASIA BLD QL SMEAR: ABNORMAL
POTASSIUM SERPL-SCNC: 3.7 MMOL/L (ref 3.5–5.3)
PR INTERVAL: 158 MS
PR INTERVAL: 186 MS
PROT SERPL-MCNC: 7.2 G/DL (ref 6.4–8.2)
Q ONSET: 216 MS
Q ONSET: 217 MS
QRS COUNT: 12 BEATS
QRS COUNT: 12 BEATS
QRS DURATION: 110 MS
QRS DURATION: 96 MS
QT INTERVAL: 366 MS
QT INTERVAL: 374 MS
QTC CALCULATION(BAZETT): 397 MS
QTC CALCULATION(BAZETT): 417 MS
QTC FREDERICIA: 387 MS
QTC FREDERICIA: 402 MS
R AXIS: 80 DEGREES
R AXIS: 84 DEGREES
RBC # BLD AUTO: 3.06 X10*6/UL (ref 4.5–5.9)
RBC MORPH BLD: ABNORMAL
RETICS #: 0.53 X10*6/UL (ref 0.02–0.12)
RETICS/RBC NFR AUTO: 17.2 % (ref 0.5–2)
SCHISTOCYTES BLD QL SMEAR: ABNORMAL
SICKLE CELLS BLD QL SMEAR: ABNORMAL
SODIUM SERPL-SCNC: 142 MMOL/L (ref 136–145)
T AXIS: 43 DEGREES
T AXIS: 8 DEGREES
T OFFSET: 400 MS
T OFFSET: 403 MS
TARGETS BLD QL SMEAR: ABNORMAL
TOTAL CELLS COUNTED BLD: 64
VENTRICULAR RATE: 71 BPM
VENTRICULAR RATE: 75 BPM
WBC # BLD AUTO: 13.7 X10*3/UL (ref 4.4–11.3)

## 2024-01-28 PROCEDURE — 2500000004 HC RX 250 GENERAL PHARMACY W/ HCPCS (ALT 636 FOR OP/ED)

## 2024-01-28 PROCEDURE — 99284 EMERGENCY DEPT VISIT MOD MDM: CPT | Mod: 25

## 2024-01-28 PROCEDURE — 85045 AUTOMATED RETICULOCYTE COUNT: CPT | Performed by: EMERGENCY MEDICINE

## 2024-01-28 PROCEDURE — 93005 ELECTROCARDIOGRAM TRACING: CPT

## 2024-01-28 PROCEDURE — 36415 COLL VENOUS BLD VENIPUNCTURE: CPT | Performed by: EMERGENCY MEDICINE

## 2024-01-28 PROCEDURE — 83615 LACTATE (LD) (LDH) ENZYME: CPT | Performed by: EMERGENCY MEDICINE

## 2024-01-28 PROCEDURE — 73130 X-RAY EXAM OF HAND: CPT | Mod: LEFT SIDE | Performed by: RADIOLOGY

## 2024-01-28 PROCEDURE — 96375 TX/PRO/DX INJ NEW DRUG ADDON: CPT

## 2024-01-28 PROCEDURE — 71046 X-RAY EXAM CHEST 2 VIEWS: CPT

## 2024-01-28 PROCEDURE — 2500000001 HC RX 250 WO HCPCS SELF ADMINISTERED DRUGS (ALT 637 FOR MEDICARE OP): Performed by: STUDENT IN AN ORGANIZED HEALTH CARE EDUCATION/TRAINING PROGRAM

## 2024-01-28 PROCEDURE — 85007 BL SMEAR W/DIFF WBC COUNT: CPT | Performed by: EMERGENCY MEDICINE

## 2024-01-28 PROCEDURE — 73090 X-RAY EXAM OF FOREARM: CPT | Mod: LT

## 2024-01-28 PROCEDURE — 80053 COMPREHEN METABOLIC PANEL: CPT | Performed by: EMERGENCY MEDICINE

## 2024-01-28 PROCEDURE — 96374 THER/PROPH/DIAG INJ IV PUSH: CPT

## 2024-01-28 PROCEDURE — 99285 EMERGENCY DEPT VISIT HI MDM: CPT | Performed by: EMERGENCY MEDICINE

## 2024-01-28 PROCEDURE — 73130 X-RAY EXAM OF HAND: CPT | Mod: LT

## 2024-01-28 PROCEDURE — 96376 TX/PRO/DX INJ SAME DRUG ADON: CPT

## 2024-01-28 PROCEDURE — 73090 X-RAY EXAM OF FOREARM: CPT | Mod: LEFT SIDE | Performed by: RADIOLOGY

## 2024-01-28 PROCEDURE — 2500000004 HC RX 250 GENERAL PHARMACY W/ HCPCS (ALT 636 FOR OP/ED): Performed by: STUDENT IN AN ORGANIZED HEALTH CARE EDUCATION/TRAINING PROGRAM

## 2024-01-28 PROCEDURE — 71046 X-RAY EXAM CHEST 2 VIEWS: CPT | Performed by: STUDENT IN AN ORGANIZED HEALTH CARE EDUCATION/TRAINING PROGRAM

## 2024-01-28 PROCEDURE — 2500000001 HC RX 250 WO HCPCS SELF ADMINISTERED DRUGS (ALT 637 FOR MEDICARE OP)

## 2024-01-28 PROCEDURE — 85027 COMPLETE CBC AUTOMATED: CPT | Performed by: EMERGENCY MEDICINE

## 2024-01-28 RX ORDER — KETOROLAC TROMETHAMINE 15 MG/ML
15 INJECTION, SOLUTION INTRAMUSCULAR; INTRAVENOUS ONCE
Status: COMPLETED | OUTPATIENT
Start: 2024-01-28 | End: 2024-01-28

## 2024-01-28 RX ORDER — HYDROMORPHONE HYDROCHLORIDE 1 MG/ML
1 INJECTION, SOLUTION INTRAMUSCULAR; INTRAVENOUS; SUBCUTANEOUS
Status: COMPLETED | OUTPATIENT
Start: 2024-01-28 | End: 2024-01-28

## 2024-01-28 RX ORDER — OXYCODONE HYDROCHLORIDE 5 MG/1
10 TABLET ORAL ONCE
Status: COMPLETED | OUTPATIENT
Start: 2024-01-28 | End: 2024-01-28

## 2024-01-28 RX ORDER — ONDANSETRON HYDROCHLORIDE 2 MG/ML
4 INJECTION, SOLUTION INTRAVENOUS ONCE
Status: COMPLETED | OUTPATIENT
Start: 2024-01-28 | End: 2024-01-28

## 2024-01-28 RX ORDER — OXYCODONE HYDROCHLORIDE 5 MG/1
5 TABLET ORAL EVERY 6 HOURS PRN
Qty: 12 TABLET | Refills: 0 | Status: SHIPPED | OUTPATIENT
Start: 2024-01-28 | End: 2024-01-31

## 2024-01-28 RX ORDER — DIPHENHYDRAMINE HCL 25 MG
25 CAPSULE ORAL ONCE
Status: COMPLETED | OUTPATIENT
Start: 2024-01-28 | End: 2024-01-28

## 2024-01-28 RX ADMIN — HYDROMORPHONE HYDROCHLORIDE 1 MG: 1 INJECTION, SOLUTION INTRAMUSCULAR; INTRAVENOUS; SUBCUTANEOUS at 19:18

## 2024-01-28 RX ADMIN — HYDROMORPHONE HYDROCHLORIDE 1 MG: 1 INJECTION, SOLUTION INTRAMUSCULAR; INTRAVENOUS; SUBCUTANEOUS at 21:27

## 2024-01-28 RX ADMIN — OXYCODONE HYDROCHLORIDE 10 MG: 5 TABLET ORAL at 22:49

## 2024-01-28 RX ADMIN — ONDANSETRON 4 MG: 2 INJECTION INTRAMUSCULAR; INTRAVENOUS at 21:27

## 2024-01-28 RX ADMIN — KETOROLAC TROMETHAMINE 15 MG: 15 INJECTION, SOLUTION INTRAMUSCULAR; INTRAVENOUS at 19:51

## 2024-01-28 RX ADMIN — HYDROMORPHONE HYDROCHLORIDE 1 MG: 1 INJECTION, SOLUTION INTRAMUSCULAR; INTRAVENOUS; SUBCUTANEOUS at 18:17

## 2024-01-28 RX ADMIN — DIPHENHYDRAMINE HYDROCHLORIDE 25 MG: 25 CAPSULE ORAL at 18:17

## 2024-01-28 ASSESSMENT — PAIN SCALES - GENERAL
PAINLEVEL_OUTOF10: 10 - WORST POSSIBLE PAIN
PAINLEVEL_OUTOF10: 0 - NO PAIN
PAINLEVEL_OUTOF10: 10 - WORST POSSIBLE PAIN

## 2024-01-28 ASSESSMENT — PAIN - FUNCTIONAL ASSESSMENT: PAIN_FUNCTIONAL_ASSESSMENT: 0-10

## 2024-01-28 ASSESSMENT — PAIN DESCRIPTION - ORIENTATION: ORIENTATION: LEFT

## 2024-01-28 ASSESSMENT — LIFESTYLE VARIABLES
HAVE YOU EVER FELT YOU SHOULD CUT DOWN ON YOUR DRINKING: NO
EVER FELT BAD OR GUILTY ABOUT YOUR DRINKING: NO
HAVE PEOPLE ANNOYED YOU BY CRITICIZING YOUR DRINKING: NO
REASON UNABLE TO ASSESS: NO
EVER HAD A DRINK FIRST THING IN THE MORNING TO STEADY YOUR NERVES TO GET RID OF A HANGOVER: NO

## 2024-01-28 ASSESSMENT — PAIN DESCRIPTION - LOCATION: LOCATION: ARM

## 2024-01-28 ASSESSMENT — PAIN DESCRIPTION - PAIN TYPE: TYPE: ACUTE PAIN
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
## 2024-01-29 DIAGNOSIS — D57.00 SICKLE CELL DISEASE WITH CRISIS (MULTI): Primary | ICD-10-CM

## 2024-01-29 DIAGNOSIS — C81.03 NODULAR LYMPHOCYTE PREDOMINANT HODGKIN LYMPHOMA OF INTRA-ABDOMINAL LYMPH NODES (MULTI): ICD-10-CM

## 2024-01-29 RX ORDER — GLUTAMINE 5 G/1
15 POWDER, FOR SOLUTION ORAL 2 TIMES DAILY
Qty: 60 EACH | Refills: 3 | Status: SHIPPED | OUTPATIENT
Start: 2024-01-29 | End: 2024-02-06 | Stop reason: SDUPTHER

## 2024-01-29 NOTE — DISCHARGE INSTRUCTIONS
Please follow-up with the sickle cell clinic as soon as possible.  Return with any new or worsening symptoms.

## 2024-01-29 NOTE — PROGRESS NOTES
Patient was handed off to me from the previous team. For full history, physical, and prior ED course, please see previous provider note prior to patient handoff. This is an addendum to the record.    This is a 23-year-old male with history of sickle cell disease and Hodgkin's lymphoma presenting to the ED with sickle cell pain.  Patient was handed off to me pending reevaluation after pain control per care plan.  Patient did have labs which were at baseline (despite mildly elevated WBCs likely secondary to patient's initiation of steroids recently),   And x-rays of the extremities were within normal limits without any acute traumatic injury.  On my reevaluation patient has full range of motion of all of the joints in the arm without overlying erythema warmth or swelling.  I am not concerned for septic joint.  Patient felt improved after treatment per his care plan and requested discharge home.  Patient was given strict return precautions and was told to follow-up as soon as possible with oncologist and with sickle cell clinic.  Patient and mom agreeable to plan patient discharged in stable condition.        Disposition:  As a result of the work-up, patient was discharged home.  They were informed of their diagnosis and instructed to come back with any concerns or worsening of condition and was agreeable to the plan as discussed above.  The patient was given the opportunity to ask questions.  All of the patient's questions were answered.  The patient remained stable under my care.    Patient discussed with attending physician.     Janice Gr MD PGY3  Emergency Medicine

## 2024-02-02 RX ORDER — DIPHENHYDRAMINE HCL 50 MG
50 CAPSULE ORAL ONCE
Status: CANCELLED | OUTPATIENT
Start: 2024-02-08

## 2024-02-02 RX ORDER — DIPHENHYDRAMINE HYDROCHLORIDE 50 MG/ML
50 INJECTION INTRAMUSCULAR; INTRAVENOUS AS NEEDED
Status: CANCELLED | OUTPATIENT
Start: 2024-02-08

## 2024-02-02 RX ORDER — EPINEPHRINE 0.3 MG/.3ML
0.3 INJECTION SUBCUTANEOUS EVERY 5 MIN PRN
Status: CANCELLED | OUTPATIENT
Start: 2024-02-08

## 2024-02-02 RX ORDER — ALBUTEROL SULFATE 0.83 MG/ML
3 SOLUTION RESPIRATORY (INHALATION) AS NEEDED
Status: CANCELLED | OUTPATIENT
Start: 2024-02-08

## 2024-02-02 RX ORDER — FAMOTIDINE 10 MG/ML
20 INJECTION INTRAVENOUS ONCE AS NEEDED
Status: CANCELLED | OUTPATIENT
Start: 2024-02-08

## 2024-02-02 RX ORDER — ACETAMINOPHEN 325 MG/1
650 TABLET ORAL ONCE
Status: CANCELLED | OUTPATIENT
Start: 2024-02-08

## 2024-02-06 DIAGNOSIS — D57.00 SICKLE CELL DISEASE WITH CRISIS (MULTI): ICD-10-CM

## 2024-02-06 RX ORDER — GLUTAMINE 5 G/1
15 POWDER, FOR SOLUTION ORAL 2 TIMES DAILY
Qty: 60 EACH | Refills: 3 | Status: ON HOLD | OUTPATIENT
Start: 2024-02-06 | End: 2024-02-13 | Stop reason: SDUPTHER

## 2024-02-08 ENCOUNTER — TELEMEDICINE (OUTPATIENT)
Dept: HEMATOLOGY/ONCOLOGY | Facility: HOSPITAL | Age: 24
End: 2024-02-08
Payer: COMMERCIAL

## 2024-02-08 ENCOUNTER — INFUSION (OUTPATIENT)
Dept: HEMATOLOGY/ONCOLOGY | Facility: HOSPITAL | Age: 24
End: 2024-02-08
Payer: COMMERCIAL

## 2024-02-08 VITALS
BODY MASS INDEX: 21.32 KG/M2 | SYSTOLIC BLOOD PRESSURE: 141 MMHG | WEIGHT: 161.6 LBS | DIASTOLIC BLOOD PRESSURE: 63 MMHG | HEART RATE: 110 BPM | TEMPERATURE: 98.2 F | OXYGEN SATURATION: 95 % | RESPIRATION RATE: 18 BRPM

## 2024-02-08 DIAGNOSIS — C81.03 NODULAR LYMPHOCYTE PREDOMINANT HODGKIN LYMPHOMA OF INTRA-ABDOMINAL LYMPH NODES (MULTI): ICD-10-CM

## 2024-02-08 LAB
ACANTHOCYTES BLD QL SMEAR: NORMAL
ALBUMIN SERPL BCP-MCNC: 4.7 G/DL (ref 3.4–5)
ALP SERPL-CCNC: 83 U/L (ref 33–120)
ALT SERPL W P-5'-P-CCNC: 26 U/L (ref 10–52)
ANION GAP SERPL CALC-SCNC: 13 MMOL/L (ref 10–20)
AST SERPL W P-5'-P-CCNC: 47 U/L (ref 9–39)
BASOPHILS # BLD AUTO: 0.07 X10*3/UL (ref 0–0.1)
BASOPHILS NFR BLD AUTO: 0.6 %
BILIRUB SERPL-MCNC: 8.1 MG/DL (ref 0–1.2)
BUN SERPL-MCNC: 7 MG/DL (ref 6–23)
BURR CELLS BLD QL SMEAR: NORMAL
CALCIUM SERPL-MCNC: 9.4 MG/DL (ref 8.6–10.3)
CHLORIDE SERPL-SCNC: 107 MMOL/L (ref 98–107)
CO2 SERPL-SCNC: 24 MMOL/L (ref 21–32)
CREAT SERPL-MCNC: 0.66 MG/DL (ref 0.5–1.3)
EGFRCR SERPLBLD CKD-EPI 2021: >90 ML/MIN/1.73M*2
EOSINOPHIL # BLD AUTO: 0.18 X10*3/UL (ref 0–0.7)
EOSINOPHIL NFR BLD AUTO: 1.4 %
ERYTHROCYTE [DISTWIDTH] IN BLOOD BY AUTOMATED COUNT: 26.4 % (ref 11.5–14.5)
GLUCOSE SERPL-MCNC: 99 MG/DL (ref 74–99)
HCT VFR BLD AUTO: 22 % (ref 41–52)
HGB BLD-MCNC: 8 G/DL (ref 13.5–17.5)
HOWELL-JOLLY BOD BLD QL SMEAR: PRESENT
HYPOCHROMIA BLD QL SMEAR: NORMAL
IMM GRANULOCYTES # BLD AUTO: 0.08 X10*3/UL (ref 0–0.7)
IMM GRANULOCYTES NFR BLD AUTO: 0.6 % (ref 0–0.9)
LDH SERPL L TO P-CCNC: 449 U/L (ref 84–246)
LYMPHOCYTES # BLD AUTO: 3.75 X10*3/UL (ref 1.2–4.8)
LYMPHOCYTES NFR BLD AUTO: 29.6 %
MCH RBC QN AUTO: 28.1 PG (ref 26–34)
MCHC RBC AUTO-ENTMCNC: 36.4 G/DL (ref 32–36)
MCV RBC AUTO: 77 FL (ref 80–100)
MONOCYTES # BLD AUTO: 1.19 X10*3/UL (ref 0.1–1)
MONOCYTES NFR BLD AUTO: 9.4 %
NEUTROPHILS # BLD AUTO: 7.42 X10*3/UL (ref 1.2–7.7)
NEUTROPHILS NFR BLD AUTO: 58.4 %
NRBC BLD-RTO: 1.8 /100 WBCS (ref 0–0)
PLATELET # BLD AUTO: 273 X10*3/UL (ref 150–450)
POLYCHROMASIA BLD QL SMEAR: NORMAL
POTASSIUM SERPL-SCNC: 4.3 MMOL/L (ref 3.5–5.3)
PROT SERPL-MCNC: 7.4 G/DL (ref 6.4–8.2)
RBC # BLD AUTO: 2.85 X10*6/UL (ref 4.5–5.9)
RBC MORPH BLD: NORMAL
SCHISTOCYTES BLD QL SMEAR: NORMAL
SICKLE CELLS BLD QL SMEAR: NORMAL
SODIUM SERPL-SCNC: 140 MMOL/L (ref 136–145)
TARGETS BLD QL SMEAR: NORMAL
URATE SERPL-MCNC: 4.3 MG/DL (ref 4–7.5)
WBC # BLD AUTO: 12.7 X10*3/UL (ref 4.4–11.3)

## 2024-02-08 PROCEDURE — 99215 OFFICE O/P EST HI 40 MIN: CPT | Performed by: INTERNAL MEDICINE

## 2024-02-08 PROCEDURE — 36415 COLL VENOUS BLD VENIPUNCTURE: CPT

## 2024-02-08 PROCEDURE — 84550 ASSAY OF BLOOD/URIC ACID: CPT

## 2024-02-08 PROCEDURE — 83615 LACTATE (LD) (LDH) ENZYME: CPT

## 2024-02-08 PROCEDURE — 80053 COMPREHEN METABOLIC PANEL: CPT

## 2024-02-08 PROCEDURE — 1036F TOBACCO NON-USER: CPT | Performed by: INTERNAL MEDICINE

## 2024-02-08 PROCEDURE — 85025 COMPLETE CBC W/AUTO DIFF WBC: CPT

## 2024-02-08 NOTE — PROGRESS NOTES
Due to previous reaction, infusion department is unable to run Rituxan at an accelerated rate today. Patient will come back tomorrow morning - provider and pharmacy notified.

## 2024-02-09 ENCOUNTER — DOCUMENTATION (OUTPATIENT)
Dept: PASTORAL CARE | Facility: HOSPITAL | Age: 24
End: 2024-02-09
Payer: COMMERCIAL

## 2024-02-09 ENCOUNTER — INFUSION (OUTPATIENT)
Dept: HEMATOLOGY/ONCOLOGY | Facility: HOSPITAL | Age: 24
End: 2024-02-09
Payer: COMMERCIAL

## 2024-02-09 VITALS
DIASTOLIC BLOOD PRESSURE: 59 MMHG | RESPIRATION RATE: 16 BRPM | SYSTOLIC BLOOD PRESSURE: 133 MMHG | TEMPERATURE: 98.4 F | OXYGEN SATURATION: 91 % | HEART RATE: 91 BPM

## 2024-02-09 DIAGNOSIS — C81.03 NODULAR LYMPHOCYTE PREDOMINANT HODGKIN LYMPHOMA OF INTRA-ABDOMINAL LYMPH NODES (MULTI): ICD-10-CM

## 2024-02-09 DIAGNOSIS — C81.03 NODULAR LYMPHOCYTE PREDOMINANT HODGKIN LYMPHOMA OF INTRA-ABDOMINAL LYMPH NODES (MULTI): Primary | ICD-10-CM

## 2024-02-09 PROCEDURE — 2500000004 HC RX 250 GENERAL PHARMACY W/ HCPCS (ALT 636 FOR OP/ED): Performed by: INTERNAL MEDICINE

## 2024-02-09 PROCEDURE — 2500000001 HC RX 250 WO HCPCS SELF ADMINISTERED DRUGS (ALT 637 FOR MEDICARE OP): Performed by: INTERNAL MEDICINE

## 2024-02-09 PROCEDURE — 96415 CHEMO IV INFUSION ADDL HR: CPT

## 2024-02-09 PROCEDURE — 96375 TX/PRO/DX INJ NEW DRUG ADDON: CPT | Mod: INF

## 2024-02-09 PROCEDURE — 96413 CHEMO IV INFUSION 1 HR: CPT

## 2024-02-09 RX ORDER — DIPHENHYDRAMINE HYDROCHLORIDE 50 MG/ML
50 INJECTION INTRAMUSCULAR; INTRAVENOUS AS NEEDED
Status: DISCONTINUED | OUTPATIENT
Start: 2024-02-09 | End: 2024-02-09 | Stop reason: HOSPADM

## 2024-02-09 RX ORDER — FAMOTIDINE 10 MG/ML
20 INJECTION INTRAVENOUS ONCE AS NEEDED
Status: DISCONTINUED | OUTPATIENT
Start: 2024-02-09 | End: 2024-02-09 | Stop reason: HOSPADM

## 2024-02-09 RX ORDER — EPINEPHRINE 0.3 MG/.3ML
0.3 INJECTION SUBCUTANEOUS EVERY 5 MIN PRN
Status: DISCONTINUED | OUTPATIENT
Start: 2024-02-09 | End: 2024-02-09 | Stop reason: HOSPADM

## 2024-02-09 RX ORDER — DIPHENHYDRAMINE HCL 50 MG
50 CAPSULE ORAL ONCE
Status: COMPLETED | OUTPATIENT
Start: 2024-02-09 | End: 2024-02-09

## 2024-02-09 RX ORDER — ALBUTEROL SULFATE 0.83 MG/ML
3 SOLUTION RESPIRATORY (INHALATION) AS NEEDED
Status: DISCONTINUED | OUTPATIENT
Start: 2024-02-09 | End: 2024-02-09 | Stop reason: HOSPADM

## 2024-02-09 RX ORDER — ACETAMINOPHEN 325 MG/1
650 TABLET ORAL ONCE
Status: COMPLETED | OUTPATIENT
Start: 2024-02-09 | End: 2024-02-09

## 2024-02-09 RX ADMIN — SODIUM CHLORIDE 700 MG: 9 INJECTION, SOLUTION INTRAVENOUS at 10:14

## 2024-02-09 RX ADMIN — ACETAMINOPHEN 650 MG: 325 TABLET ORAL at 09:19

## 2024-02-09 RX ADMIN — METHYLPREDNISOLONE SODIUM SUCCINATE 125 MG: 125 INJECTION, POWDER, FOR SOLUTION INTRAMUSCULAR; INTRAVENOUS at 09:22

## 2024-02-09 RX ADMIN — DIPHENHYDRAMINE HYDROCHLORIDE 50 MG: 50 CAPSULE ORAL at 09:20

## 2024-02-09 NOTE — PROGRESS NOTES
Pt tolerated 2nd dose rituxan infusion without incident.  Discharged home in stable condition at 1405

## 2024-02-09 NOTE — PROGRESS NOTES
prednisonePatient ID: Joellen Narayan is a 23 y.o. male.  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 He came with his monther.  12/16/23: He was found to have retroperitoneal LN's and recent PET showed slight increase RPLN's in size, Interval development on multiple FDG avid focus within sternum, vertebral body, R acetabular wall and L femoral head. MRI C/T/L spine (12/20) slight decreased marrow signal throughout the spine consistent with chronic anemia in sickle cell disease. MRI Pelvis (12/20) T2 and FLAIR hyperintense signal of the R superior endplate of the L5 vertebral body, R superior acetabulum, and L femoral head corresponding to hypermetabolic lesions seen on PET that may represent osseous lymphomatous involvement. He underwent Bx per IR on 11/30/23 and path was c/w NLPHL(grade II) He denies recent night sweats, weight loss, n/v/d or abd pain. He also denies bowel/urinary problems.   1/18/24: here for chemotherapy. Since last visit, he was seen 2 x at ED with sickle cell crisis. Doing ok since.   2/8/24: here for cycle #2. Feels well today. Labs reviewed    For his sickle cell disease, he is not on hydroxyurea and presents often to ED for SS cirsis(last seen at ED on 12/24 and 12/25/23). He also has nocturnal hypoxia (given 2 L O2 per nasal cannula at home but he rarely uses it.      Social History  He reports that he has never smoked. He has been exposed to tobacco smoke. He has never used smokeless tobacco. He reports that he does not drink alcohol and does not use drugs. He has 3 siblings with sickle cell trait    FINAL DIAGNOSIS  11/30/23  A. LYMPH NODE, PARAAORTIC, CORE BIOPSY:      -- NODULAR LYMPHOCYTE PREDOMINANT HODGKIN LYMPHOMA (WHO 2022 CLASSIFICATION)/ NODULAR LYMPHOCYTE PREDOMINANT B CELL LYMPHOMA (ICC 2022  CLASSIFICATION) (NLPHL)            Review of Systems   All other systems reviewed and are negative.       Objective   BSA: There is no height or weight on file to calculate BSA.  There were no vitals taken for this visit.    No family history on file.  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 He has been exposed to tobacco smoke. He has never used smokeless tobacco.  He  reports no history of alcohol use.  He  reports no history of drug use.    Physical Exam  HENT:      Head: Normocephalic.   Eyes:      Pupils: Pupils are equal, round, and reactive to light.   Cardiovascular:      Rate and Rhythm: Normal rate.      Pulses: Normal pulses.      Comments: 2/6 AMADEO  Pulmonary:      Effort: Pulmonary effort is normal.      Breath sounds: Normal breath sounds.   Abdominal:      General: Abdomen is flat.      Palpations: Abdomen is soft.   Musculoskeletal:         General: Normal range of motion.      Cervical back: Normal range of motion and neck supple.   Neurological:      General: No focal deficit present.      Mental Status: He is alert.       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  He came with his monther.    NLPHL  - echocardiogram reviewed with patient  - elevated LDH/bili partially from sickle cell disease  - chemotherapy was discussed(R-CHOP).-> we decided to simplify his chemotherapy to Rituxan and prdnisone q 3 weeks mainly due to frequent sickle cell crisis  -tolerating chemo well.    Sickle cell anemia    Hypoxia   On home O2 but not compliant  - encouraged him to use-> if not he will have more frequent SS crisis     RTC 3 weeks for cycle #3    Melissa Stoll MD

## 2024-02-09 NOTE — PROGRESS NOTES
Spiritual Care Visit    Clinical Encounter Type  Visited With: Patient and family together  Routine Visit: Follow-up  Continue Visiting: Yes     visited patient Joellen Narayan during his infusion. Patient known to this  from previous visits.  introduced self and role to his mother. Patient shared he began treatments a few weeks ago and has not had any negative effects so far. He shared he is still working and is mindful of if his body needs a break.  asked about patient's request from previous admission regarding a weekly check in. He expressed that would be helpful and would like that, so appointment was scheduled for next Friday. Patient was appreciative and did not have any further needs at this time. Spiritual Care remains available as needed/requested.    Rev. Diana Álvarez MDiv, BCC

## 2024-02-11 ENCOUNTER — HOSPITAL ENCOUNTER (INPATIENT)
Facility: HOSPITAL | Age: 24
LOS: 2 days | Discharge: HOME | DRG: 812 | End: 2024-02-14
Attending: EMERGENCY MEDICINE | Admitting: INTERNAL MEDICINE
Payer: COMMERCIAL

## 2024-02-11 ENCOUNTER — APPOINTMENT (OUTPATIENT)
Dept: RADIOLOGY | Facility: HOSPITAL | Age: 24
DRG: 812 | End: 2024-02-11
Payer: COMMERCIAL

## 2024-02-11 DIAGNOSIS — D57.00 SICKLE CELL CRISIS (MULTI): Primary | ICD-10-CM

## 2024-02-11 LAB
ALBUMIN SERPL BCP-MCNC: 4.7 G/DL (ref 3.4–5)
ALP SERPL-CCNC: 75 U/L (ref 33–120)
ALT SERPL W P-5'-P-CCNC: 18 U/L (ref 10–52)
ANION GAP SERPL CALC-SCNC: 15 MMOL/L (ref 10–20)
AST SERPL W P-5'-P-CCNC: 35 U/L (ref 9–39)
BASOPHILS # BLD AUTO: 0.05 X10*3/UL (ref 0–0.1)
BASOPHILS NFR BLD AUTO: 0.4 %
BILIRUB SERPL-MCNC: 8.1 MG/DL (ref 0–1.2)
BUN SERPL-MCNC: 5 MG/DL (ref 6–23)
CALCIUM SERPL-MCNC: 9.8 MG/DL (ref 8.6–10.6)
CARDIAC TROPONIN I PNL SERPL HS: 9 NG/L (ref 0–53)
CHLORIDE SERPL-SCNC: 106 MMOL/L (ref 98–107)
CO2 SERPL-SCNC: 22 MMOL/L (ref 21–32)
CREAT SERPL-MCNC: 0.49 MG/DL (ref 0.5–1.3)
EGFRCR SERPLBLD CKD-EPI 2021: >90 ML/MIN/1.73M*2
EOSINOPHIL # BLD AUTO: 0.23 X10*3/UL (ref 0–0.7)
EOSINOPHIL NFR BLD AUTO: 1.9 %
ERYTHROCYTE [DISTWIDTH] IN BLOOD BY AUTOMATED COUNT: 25.4 % (ref 11.5–14.5)
FLUAV RNA RESP QL NAA+PROBE: NOT DETECTED
FLUBV RNA RESP QL NAA+PROBE: NOT DETECTED
GLUCOSE SERPL-MCNC: 85 MG/DL (ref 74–99)
HCT VFR BLD AUTO: 21.7 % (ref 41–52)
HGB BLD-MCNC: 8.1 G/DL (ref 13.5–17.5)
HGB RETIC QN: 29 PG (ref 28–38)
HYPOCHROMIA BLD QL SMEAR: NORMAL
IMM GRANULOCYTES # BLD AUTO: 0.07 X10*3/UL (ref 0–0.7)
IMM GRANULOCYTES NFR BLD AUTO: 0.6 % (ref 0–0.9)
IMMATURE RETIC FRACTION: 36.1 %
LDH SERPL L TO P-CCNC: 361 U/L (ref 84–246)
LYMPHOCYTES # BLD AUTO: 4.81 X10*3/UL (ref 1.2–4.8)
LYMPHOCYTES NFR BLD AUTO: 40 %
MCH RBC QN AUTO: 28 PG (ref 26–34)
MCHC RBC AUTO-ENTMCNC: 37.3 G/DL (ref 32–36)
MCV RBC AUTO: 75 FL (ref 80–100)
MONOCYTES # BLD AUTO: 1.38 X10*3/UL (ref 0.1–1)
MONOCYTES NFR BLD AUTO: 11.5 %
NEUTROPHILS # BLD AUTO: 5.48 X10*3/UL (ref 1.2–7.7)
NEUTROPHILS NFR BLD AUTO: 45.6 %
NRBC BLD-RTO: 2.5 /100 WBCS (ref 0–0)
PLATELET # BLD AUTO: 408 X10*3/UL (ref 150–450)
POLYCHROMASIA BLD QL SMEAR: NORMAL
POTASSIUM SERPL-SCNC: 3.5 MMOL/L (ref 3.5–5.3)
PROT SERPL-MCNC: 7.1 G/DL (ref 6.4–8.2)
RBC # BLD AUTO: 2.89 X10*6/UL (ref 4.5–5.9)
RBC MORPH BLD: NORMAL
RETICS #: 0.53 X10*6/UL (ref 0.02–0.12)
RETICS/RBC NFR AUTO: 18.3 % (ref 0.5–2)
SARS-COV-2 RNA RESP QL NAA+PROBE: NOT DETECTED
SCHISTOCYTES BLD QL SMEAR: NORMAL
SICKLE CELLS BLD QL SMEAR: NORMAL
SODIUM SERPL-SCNC: 139 MMOL/L (ref 136–145)
TARGETS BLD QL SMEAR: NORMAL
WBC # BLD AUTO: 12 X10*3/UL (ref 4.4–11.3)

## 2024-02-11 PROCEDURE — 2500000004 HC RX 250 GENERAL PHARMACY W/ HCPCS (ALT 636 FOR OP/ED)

## 2024-02-11 PROCEDURE — 2500000004 HC RX 250 GENERAL PHARMACY W/ HCPCS (ALT 636 FOR OP/ED): Performed by: EMERGENCY MEDICINE

## 2024-02-11 PROCEDURE — 96376 TX/PRO/DX INJ SAME DRUG ADON: CPT

## 2024-02-11 PROCEDURE — 36415 COLL VENOUS BLD VENIPUNCTURE: CPT

## 2024-02-11 PROCEDURE — 2500000001 HC RX 250 WO HCPCS SELF ADMINISTERED DRUGS (ALT 637 FOR MEDICARE OP)

## 2024-02-11 PROCEDURE — 96374 THER/PROPH/DIAG INJ IV PUSH: CPT

## 2024-02-11 PROCEDURE — 96375 TX/PRO/DX INJ NEW DRUG ADDON: CPT

## 2024-02-11 PROCEDURE — 87636 SARSCOV2 & INF A&B AMP PRB: CPT

## 2024-02-11 PROCEDURE — 99285 EMERGENCY DEPT VISIT HI MDM: CPT | Performed by: EMERGENCY MEDICINE

## 2024-02-11 PROCEDURE — 71046 X-RAY EXAM CHEST 2 VIEWS: CPT

## 2024-02-11 PROCEDURE — 85025 COMPLETE CBC W/AUTO DIFF WBC: CPT

## 2024-02-11 PROCEDURE — 93010 ELECTROCARDIOGRAM REPORT: CPT | Performed by: EMERGENCY MEDICINE

## 2024-02-11 PROCEDURE — 85045 AUTOMATED RETICULOCYTE COUNT: CPT

## 2024-02-11 PROCEDURE — 80053 COMPREHEN METABOLIC PANEL: CPT

## 2024-02-11 PROCEDURE — 2500000005 HC RX 250 GENERAL PHARMACY W/O HCPCS

## 2024-02-11 PROCEDURE — 96372 THER/PROPH/DIAG INJ SC/IM: CPT

## 2024-02-11 PROCEDURE — 83615 LACTATE (LD) (LDH) ENZYME: CPT

## 2024-02-11 PROCEDURE — 84484 ASSAY OF TROPONIN QUANT: CPT

## 2024-02-11 PROCEDURE — 71046 X-RAY EXAM CHEST 2 VIEWS: CPT | Performed by: RADIOLOGY

## 2024-02-11 RX ORDER — KETOROLAC TROMETHAMINE 15 MG/ML
15 INJECTION, SOLUTION INTRAMUSCULAR; INTRAVENOUS ONCE
Status: DISCONTINUED | OUTPATIENT
Start: 2024-02-11 | End: 2024-02-14

## 2024-02-11 RX ORDER — HYDROMORPHONE HYDROCHLORIDE 1 MG/ML
1 INJECTION, SOLUTION INTRAMUSCULAR; INTRAVENOUS; SUBCUTANEOUS EVERY 30 MIN PRN
Status: COMPLETED | OUTPATIENT
Start: 2024-02-11 | End: 2024-02-11

## 2024-02-11 RX ORDER — ONDANSETRON 4 MG/1
4 TABLET, FILM COATED ORAL ONCE
Status: COMPLETED | OUTPATIENT
Start: 2024-02-11 | End: 2024-02-11

## 2024-02-11 RX ORDER — HYDROMORPHONE HYDROCHLORIDE 1 MG/ML
1 INJECTION, SOLUTION INTRAMUSCULAR; INTRAVENOUS; SUBCUTANEOUS
Status: DISCONTINUED | OUTPATIENT
Start: 2024-02-11 | End: 2024-02-12

## 2024-02-11 RX ORDER — DIPHENHYDRAMINE HCL 25 MG
25 CAPSULE ORAL ONCE
Status: COMPLETED | OUTPATIENT
Start: 2024-02-11 | End: 2024-02-11

## 2024-02-11 RX ORDER — HYDROMORPHONE HYDROCHLORIDE 1 MG/ML
1 INJECTION, SOLUTION INTRAMUSCULAR; INTRAVENOUS; SUBCUTANEOUS ONCE
Status: DISCONTINUED | OUTPATIENT
Start: 2024-02-11 | End: 2024-02-14

## 2024-02-11 RX ORDER — HYDROMORPHONE HYDROCHLORIDE 1 MG/ML
1 INJECTION, SOLUTION INTRAMUSCULAR; INTRAVENOUS; SUBCUTANEOUS
Status: DISCONTINUED | OUTPATIENT
Start: 2024-02-11 | End: 2024-02-11

## 2024-02-11 RX ORDER — KETOROLAC TROMETHAMINE 15 MG/ML
15 INJECTION, SOLUTION INTRAMUSCULAR; INTRAVENOUS ONCE
Status: COMPLETED | OUTPATIENT
Start: 2024-02-11 | End: 2024-02-11

## 2024-02-11 RX ORDER — KETOROLAC TROMETHAMINE 15 MG/ML
15 INJECTION, SOLUTION INTRAMUSCULAR; INTRAVENOUS ONCE
Status: DISCONTINUED | OUTPATIENT
Start: 2024-02-11 | End: 2024-02-11

## 2024-02-11 RX ADMIN — KETOROLAC TROMETHAMINE 15 MG: 15 INJECTION, SOLUTION INTRAMUSCULAR; INTRAVENOUS at 23:04

## 2024-02-11 RX ADMIN — DIPHENHYDRAMINE HYDROCHLORIDE 25 MG: 25 CAPSULE ORAL at 21:41

## 2024-02-11 RX ADMIN — HYDROMORPHONE HYDROCHLORIDE 1 MG: 1 INJECTION, SOLUTION INTRAMUSCULAR; INTRAVENOUS; SUBCUTANEOUS at 22:11

## 2024-02-11 RX ADMIN — ONDANSETRON HYDROCHLORIDE 4 MG: 4 TABLET, FILM COATED ORAL at 21:41

## 2024-02-11 RX ADMIN — HYDROMORPHONE HYDROCHLORIDE 1 MG: 1 INJECTION, SOLUTION INTRAMUSCULAR; INTRAVENOUS; SUBCUTANEOUS at 23:14

## 2024-02-11 RX ADMIN — HYDROMORPHONE HYDROCHLORIDE 1 MG: 1 INJECTION, SOLUTION INTRAMUSCULAR; INTRAVENOUS; SUBCUTANEOUS at 23:47

## 2024-02-11 ASSESSMENT — PAIN DESCRIPTION - LOCATION: LOCATION: LEG

## 2024-02-11 ASSESSMENT — PAIN DESCRIPTION - PAIN TYPE: TYPE: ACUTE PAIN

## 2024-02-11 ASSESSMENT — PAIN SCALES - GENERAL
PAINLEVEL_OUTOF10: 10 - WORST POSSIBLE PAIN
PAINLEVEL_OUTOF10: 10 - WORST POSSIBLE PAIN

## 2024-02-11 ASSESSMENT — PAIN DESCRIPTION - ORIENTATION: ORIENTATION: RIGHT

## 2024-02-11 ASSESSMENT — PAIN - FUNCTIONAL ASSESSMENT: PAIN_FUNCTIONAL_ASSESSMENT: 0-10

## 2024-02-12 PROBLEM — D57.00 SICKLE-CELL DISEASE WITH PAIN (MULTI): Status: RESOLVED | Noted: 2023-12-19 | Resolved: 2024-02-12

## 2024-02-12 PROBLEM — R00.2 PALPITATIONS: Status: RESOLVED | Noted: 2024-01-04 | Resolved: 2024-02-12

## 2024-02-12 PROBLEM — L01.00 IMPETIGO: Status: RESOLVED | Noted: 2024-01-04 | Resolved: 2024-02-12

## 2024-02-12 LAB
ALBUMIN SERPL BCP-MCNC: 4.1 G/DL (ref 3.4–5)
ALBUMIN SERPL BCP-MCNC: 4.3 G/DL (ref 3.4–5)
ALP SERPL-CCNC: 69 U/L (ref 33–120)
ALP SERPL-CCNC: 71 U/L (ref 33–120)
ALT SERPL W P-5'-P-CCNC: 17 U/L (ref 10–52)
ALT SERPL W P-5'-P-CCNC: 17 U/L (ref 10–52)
AMPHETAMINES UR QL SCN: ABNORMAL
ANION GAP SERPL CALC-SCNC: 11 MMOL/L (ref 10–20)
ANION GAP SERPL CALC-SCNC: 11 MMOL/L (ref 10–20)
AST SERPL W P-5'-P-CCNC: 25 U/L (ref 9–39)
AST SERPL W P-5'-P-CCNC: 29 U/L (ref 9–39)
BARBITURATES UR QL SCN: ABNORMAL
BASO STIPL BLD QL SMEAR: PRESENT
BASO STIPL BLD QL SMEAR: PRESENT
BASOPHILS # BLD MANUAL: 0.11 X10*3/UL (ref 0–0.1)
BASOPHILS # BLD MANUAL: 0.12 X10*3/UL (ref 0–0.1)
BASOPHILS NFR BLD MANUAL: 0.9 %
BASOPHILS NFR BLD MANUAL: 0.9 %
BILIRUB SERPL-MCNC: 7.4 MG/DL (ref 0–1.2)
BILIRUB SERPL-MCNC: 7.5 MG/DL (ref 0–1.2)
BUN SERPL-MCNC: 5 MG/DL (ref 6–23)
BUN SERPL-MCNC: 5 MG/DL (ref 6–23)
BZE UR QL SCN: ABNORMAL
CALCIUM SERPL-MCNC: 9.2 MG/DL (ref 8.6–10.6)
CALCIUM SERPL-MCNC: 9.2 MG/DL (ref 8.6–10.6)
CANNABINOIDS UR QL SCN: ABNORMAL
CHLORIDE SERPL-SCNC: 105 MMOL/L (ref 98–107)
CHLORIDE SERPL-SCNC: 107 MMOL/L (ref 98–107)
CO2 SERPL-SCNC: 25 MMOL/L (ref 21–32)
CO2 SERPL-SCNC: 25 MMOL/L (ref 21–32)
CREAT SERPL-MCNC: 0.47 MG/DL (ref 0.5–1.3)
CREAT SERPL-MCNC: 0.56 MG/DL (ref 0.5–1.3)
CREAT UR-MCNC: 103.8 MG/DL (ref 20–370)
EGFRCR SERPLBLD CKD-EPI 2021: >90 ML/MIN/1.73M*2
EGFRCR SERPLBLD CKD-EPI 2021: >90 ML/MIN/1.73M*2
EOSINOPHIL # BLD MANUAL: 0 X10*3/UL (ref 0–0.7)
EOSINOPHIL # BLD MANUAL: 0.23 X10*3/UL (ref 0–0.7)
EOSINOPHIL NFR BLD MANUAL: 0 %
EOSINOPHIL NFR BLD MANUAL: 1.8 %
ERYTHROCYTE [DISTWIDTH] IN BLOOD BY AUTOMATED COUNT: 24.9 % (ref 11.5–14.5)
ERYTHROCYTE [DISTWIDTH] IN BLOOD BY AUTOMATED COUNT: 25.2 % (ref 11.5–14.5)
GLUCOSE SERPL-MCNC: 90 MG/DL (ref 74–99)
GLUCOSE SERPL-MCNC: 95 MG/DL (ref 74–99)
HCT VFR BLD AUTO: 18.9 % (ref 41–52)
HCT VFR BLD AUTO: 19.4 % (ref 41–52)
HGB BLD-MCNC: 7 G/DL (ref 13.5–17.5)
HGB BLD-MCNC: 7.6 G/DL (ref 13.5–17.5)
HGB RETIC QN: 28 PG (ref 28–38)
HGB RETIC QN: 28 PG (ref 28–38)
HOLD SPECIMEN: NORMAL
HYPOCHROMIA BLD QL SMEAR: ABNORMAL
HYPOCHROMIA BLD QL SMEAR: ABNORMAL
IMM GRANULOCYTES # BLD AUTO: 0.06 X10*3/UL (ref 0–0.7)
IMM GRANULOCYTES # BLD AUTO: 0.08 X10*3/UL (ref 0–0.7)
IMM GRANULOCYTES NFR BLD AUTO: 0.5 % (ref 0–0.9)
IMM GRANULOCYTES NFR BLD AUTO: 0.6 % (ref 0–0.9)
IMMATURE RETIC FRACTION: 30.2 %
IMMATURE RETIC FRACTION: 32.8 %
LDH SERPL L TO P-CCNC: 283 U/L (ref 84–246)
LDH SERPL L TO P-CCNC: 299 U/L (ref 84–246)
LYMPHOCYTES # BLD MANUAL: 8.04 X10*3/UL (ref 1.2–4.8)
LYMPHOCYTES # BLD MANUAL: 8.28 X10*3/UL (ref 1.2–4.8)
LYMPHOCYTES NFR BLD MANUAL: 63.7 %
LYMPHOCYTES NFR BLD MANUAL: 67 %
MCH RBC QN AUTO: 27.5 PG (ref 26–34)
MCH RBC QN AUTO: 29.3 PG (ref 26–34)
MCHC RBC AUTO-ENTMCNC: 37 G/DL (ref 32–36)
MCHC RBC AUTO-ENTMCNC: 39.2 G/DL (ref 32–36)
MCV RBC AUTO: 74 FL (ref 80–100)
MCV RBC AUTO: 75 FL (ref 80–100)
MONOCYTES # BLD MANUAL: 0.46 X10*3/UL (ref 0.1–1)
MONOCYTES # BLD MANUAL: 0.83 X10*3/UL (ref 0.1–1)
MONOCYTES NFR BLD MANUAL: 3.5 %
MONOCYTES NFR BLD MANUAL: 6.9 %
NEUTS SEG # BLD MANUAL: 3.02 X10*3/UL (ref 1.2–7)
NEUTS SEG # BLD MANUAL: 3.91 X10*3/UL (ref 1.2–7)
NEUTS SEG NFR BLD MANUAL: 25.2 %
NEUTS SEG NFR BLD MANUAL: 30.1 %
NRBC BLD-RTO: 1 /100 WBCS (ref 0–0)
NRBC BLD-RTO: 1.2 /100 WBCS (ref 0–0)
PCP UR QL SCN: ABNORMAL
PLATELET # BLD AUTO: 340 X10*3/UL (ref 150–450)
PLATELET # BLD AUTO: 356 X10*3/UL (ref 150–450)
POLYCHROMASIA BLD QL SMEAR: ABNORMAL
POLYCHROMASIA BLD QL SMEAR: ABNORMAL
POTASSIUM SERPL-SCNC: 3.4 MMOL/L (ref 3.5–5.3)
POTASSIUM SERPL-SCNC: 3.6 MMOL/L (ref 3.5–5.3)
PROT SERPL-MCNC: 6.5 G/DL (ref 6.4–8.2)
PROT SERPL-MCNC: 6.6 G/DL (ref 6.4–8.2)
RBC # BLD AUTO: 2.55 X10*6/UL (ref 4.5–5.9)
RBC # BLD AUTO: 2.59 X10*6/UL (ref 4.5–5.9)
RBC MORPH BLD: ABNORMAL
RBC MORPH BLD: ABNORMAL
RETICS #: 0.52 X10*6/UL (ref 0.02–0.12)
RETICS #: 0.53 X10*6/UL (ref 0.02–0.12)
RETICS/RBC NFR AUTO: 19.7 % (ref 0.5–2)
RETICS/RBC NFR AUTO: 20.6 % (ref 0.5–2)
SCHISTOCYTES BLD QL SMEAR: ABNORMAL
SCHISTOCYTES BLD QL SMEAR: ABNORMAL
SICKLE CELLS BLD QL SMEAR: ABNORMAL
SICKLE CELLS BLD QL SMEAR: ABNORMAL
SODIUM SERPL-SCNC: 137 MMOL/L (ref 136–145)
SODIUM SERPL-SCNC: 140 MMOL/L (ref 136–145)
TARGETS BLD QL SMEAR: ABNORMAL
TARGETS BLD QL SMEAR: ABNORMAL
TOTAL CELLS COUNTED BLD: 113
TOTAL CELLS COUNTED BLD: 115
WBC # BLD AUTO: 12 X10*3/UL (ref 4.4–11.3)
WBC # BLD AUTO: 13 X10*3/UL (ref 4.4–11.3)

## 2024-02-12 PROCEDURE — 83020 HEMOGLOBIN ELECTROPHORESIS: CPT

## 2024-02-12 PROCEDURE — 36415 COLL VENOUS BLD VENIPUNCTURE: CPT

## 2024-02-12 PROCEDURE — 85007 BL SMEAR W/DIFF WBC COUNT: CPT

## 2024-02-12 PROCEDURE — 99223 1ST HOSP IP/OBS HIGH 75: CPT

## 2024-02-12 PROCEDURE — 85027 COMPLETE CBC AUTOMATED: CPT

## 2024-02-12 PROCEDURE — 85045 AUTOMATED RETICULOCYTE COUNT: CPT

## 2024-02-12 PROCEDURE — 80307 DRUG TEST PRSMV CHEM ANLYZR: CPT

## 2024-02-12 PROCEDURE — 80349 CANNABINOIDS NATURAL: CPT

## 2024-02-12 PROCEDURE — 2500000004 HC RX 250 GENERAL PHARMACY W/ HCPCS (ALT 636 FOR OP/ED)

## 2024-02-12 PROCEDURE — 84075 ASSAY ALKALINE PHOSPHATASE: CPT

## 2024-02-12 PROCEDURE — 85060 BLOOD SMEAR INTERPRETATION: CPT

## 2024-02-12 PROCEDURE — 80346 BENZODIAZEPINES1-12: CPT

## 2024-02-12 PROCEDURE — 83021 HEMOGLOBIN CHROMOTOGRAPHY: CPT

## 2024-02-12 PROCEDURE — 2500000002 HC RX 250 W HCPCS SELF ADMINISTERED DRUGS (ALT 637 FOR MEDICARE OP, ALT 636 FOR OP/ED)

## 2024-02-12 PROCEDURE — 1170000001 HC PRIVATE ONCOLOGY ROOM DAILY

## 2024-02-12 PROCEDURE — 83615 LACTATE (LD) (LDH) ENZYME: CPT

## 2024-02-12 PROCEDURE — 2500000001 HC RX 250 WO HCPCS SELF ADMINISTERED DRUGS (ALT 637 FOR MEDICARE OP)

## 2024-02-12 RX ORDER — KETOROLAC TROMETHAMINE 30 MG/ML
30 INJECTION, SOLUTION INTRAMUSCULAR; INTRAVENOUS EVERY 6 HOURS
Status: COMPLETED | OUTPATIENT
Start: 2024-02-12 | End: 2024-02-14

## 2024-02-12 RX ORDER — PREDNISONE 20 MG/1
100 TABLET ORAL ONCE
Status: COMPLETED | OUTPATIENT
Start: 2024-02-12 | End: 2024-02-12

## 2024-02-12 RX ORDER — PREDNISONE 20 MG/1
100 TABLET ORAL DAILY
Status: COMPLETED | OUTPATIENT
Start: 2024-02-13 | End: 2024-02-13

## 2024-02-12 RX ORDER — PROCHLORPERAZINE MALEATE 10 MG
10 TABLET ORAL EVERY 6 HOURS PRN
Status: DISCONTINUED | OUTPATIENT
Start: 2024-02-12 | End: 2024-02-12

## 2024-02-12 RX ORDER — FOLIC ACID 1 MG/1
1 TABLET ORAL DAILY
Status: DISCONTINUED | OUTPATIENT
Start: 2024-02-12 | End: 2024-02-14 | Stop reason: HOSPADM

## 2024-02-12 RX ORDER — ENOXAPARIN SODIUM 100 MG/ML
40 INJECTION SUBCUTANEOUS DAILY
Status: DISCONTINUED | OUTPATIENT
Start: 2024-02-12 | End: 2024-02-14 | Stop reason: HOSPADM

## 2024-02-12 RX ORDER — PANTOPRAZOLE SODIUM 40 MG/1
40 TABLET, DELAYED RELEASE ORAL
Status: DISCONTINUED | OUTPATIENT
Start: 2024-02-12 | End: 2024-02-14 | Stop reason: HOSPADM

## 2024-02-12 RX ORDER — ALLOPURINOL 300 MG/1
300 TABLET ORAL DAILY
Status: DISCONTINUED | OUTPATIENT
Start: 2024-02-12 | End: 2024-02-14 | Stop reason: HOSPADM

## 2024-02-12 RX ORDER — POLYETHYLENE GLYCOL 3350 17 G/17G
17 POWDER, FOR SOLUTION ORAL
Status: DISCONTINUED | OUTPATIENT
Start: 2024-02-12 | End: 2024-02-14 | Stop reason: HOSPADM

## 2024-02-12 RX ORDER — NALOXONE HYDROCHLORIDE 0.4 MG/ML
0.2 INJECTION, SOLUTION INTRAMUSCULAR; INTRAVENOUS; SUBCUTANEOUS EVERY 5 MIN PRN
Status: DISCONTINUED | OUTPATIENT
Start: 2024-02-12 | End: 2024-02-14 | Stop reason: HOSPADM

## 2024-02-12 RX ORDER — DIPHENHYDRAMINE HCL 25 MG
25 CAPSULE ORAL EVERY 6 HOURS PRN
Status: DISCONTINUED | OUTPATIENT
Start: 2024-02-12 | End: 2024-02-14 | Stop reason: HOSPADM

## 2024-02-12 RX ORDER — ONDANSETRON HYDROCHLORIDE 8 MG/1
8 TABLET, FILM COATED ORAL EVERY 8 HOURS PRN
Status: DISCONTINUED | OUTPATIENT
Start: 2024-02-12 | End: 2024-02-14 | Stop reason: HOSPADM

## 2024-02-12 RX ORDER — AMOXICILLIN 250 MG
2 CAPSULE ORAL EVERY 12 HOURS
Status: DISCONTINUED | OUTPATIENT
Start: 2024-02-12 | End: 2024-02-14 | Stop reason: HOSPADM

## 2024-02-12 RX ADMIN — KETOROLAC TROMETHAMINE 30 MG: 30 INJECTION, SOLUTION INTRAMUSCULAR; INTRAVENOUS at 02:31

## 2024-02-12 RX ADMIN — HYDROMORPHONE HYDROCHLORIDE 2 MG: 2 INJECTION, SOLUTION INTRAMUSCULAR; INTRAVENOUS; SUBCUTANEOUS at 09:45

## 2024-02-12 RX ADMIN — PANTOPRAZOLE SODIUM 40 MG: 40 TABLET, DELAYED RELEASE ORAL at 06:38

## 2024-02-12 RX ADMIN — HYDROMORPHONE HYDROCHLORIDE 2 MG: 2 INJECTION, SOLUTION INTRAMUSCULAR; INTRAVENOUS; SUBCUTANEOUS at 13:19

## 2024-02-12 RX ADMIN — HYDROMORPHONE HYDROCHLORIDE 2 MG: 2 INJECTION, SOLUTION INTRAMUSCULAR; INTRAVENOUS; SUBCUTANEOUS at 06:38

## 2024-02-12 RX ADMIN — HYDROMORPHONE HYDROCHLORIDE 2 MG: 2 INJECTION, SOLUTION INTRAMUSCULAR; INTRAVENOUS; SUBCUTANEOUS at 16:21

## 2024-02-12 RX ADMIN — PREDNISONE 100 MG: 20 TABLET ORAL at 08:18

## 2024-02-12 RX ADMIN — SENNOSIDES AND DOCUSATE SODIUM 2 TABLET: 8.6; 5 TABLET ORAL at 02:33

## 2024-02-12 RX ADMIN — KETOROLAC TROMETHAMINE 30 MG: 30 INJECTION, SOLUTION INTRAMUSCULAR; INTRAVENOUS at 08:19

## 2024-02-12 RX ADMIN — HYDROMORPHONE HYDROCHLORIDE 2 MG: 2 INJECTION, SOLUTION INTRAMUSCULAR; INTRAVENOUS; SUBCUTANEOUS at 02:33

## 2024-02-12 RX ADMIN — ENOXAPARIN SODIUM 40 MG: 100 INJECTION SUBCUTANEOUS at 08:18

## 2024-02-12 RX ADMIN — DIPHENHYDRAMINE HYDROCHLORIDE 25 MG: 25 CAPSULE ORAL at 04:35

## 2024-02-12 RX ADMIN — ALLOPURINOL 300 MG: 300 TABLET ORAL at 08:18

## 2024-02-12 RX ADMIN — FOLIC ACID 1 MG: 1 TABLET ORAL at 08:18

## 2024-02-12 RX ADMIN — KETOROLAC TROMETHAMINE 30 MG: 30 INJECTION, SOLUTION INTRAMUSCULAR; INTRAVENOUS at 20:42

## 2024-02-12 RX ADMIN — HYDROMORPHONE HYDROCHLORIDE 2 MG: 2 INJECTION, SOLUTION INTRAMUSCULAR; INTRAVENOUS; SUBCUTANEOUS at 04:35

## 2024-02-12 RX ADMIN — HYDROMORPHONE HYDROCHLORIDE 2 MG: 2 INJECTION, SOLUTION INTRAMUSCULAR; INTRAVENOUS; SUBCUTANEOUS at 21:17

## 2024-02-12 RX ADMIN — SENNOSIDES AND DOCUSATE SODIUM 2 TABLET: 8.6; 5 TABLET ORAL at 13:20

## 2024-02-12 RX ADMIN — KETOROLAC TROMETHAMINE 30 MG: 30 INJECTION, SOLUTION INTRAMUSCULAR; INTRAVENOUS at 13:20

## 2024-02-12 SDOH — SOCIAL STABILITY: SOCIAL INSECURITY: WERE YOU ABLE TO COMPLETE ALL THE BEHAVIORAL HEALTH SCREENINGS?: YES

## 2024-02-12 SDOH — SOCIAL STABILITY: SOCIAL INSECURITY: DO YOU FEEL ANYONE HAS EXPLOITED OR TAKEN ADVANTAGE OF YOU FINANCIALLY OR OF YOUR PERSONAL PROPERTY?: NO

## 2024-02-12 SDOH — SOCIAL STABILITY: SOCIAL INSECURITY: ARE THERE ANY APPARENT SIGNS OF INJURIES/BEHAVIORS THAT COULD BE RELATED TO ABUSE/NEGLECT?: NO

## 2024-02-12 SDOH — SOCIAL STABILITY: SOCIAL INSECURITY: HAS ANYONE EVER THREATENED TO HURT YOUR FAMILY OR YOUR PETS?: NO

## 2024-02-12 SDOH — SOCIAL STABILITY: SOCIAL INSECURITY: DOES ANYONE TRY TO KEEP YOU FROM HAVING/CONTACTING OTHER FRIENDS OR DOING THINGS OUTSIDE YOUR HOME?: NO

## 2024-02-12 SDOH — SOCIAL STABILITY: SOCIAL INSECURITY: HAVE YOU HAD THOUGHTS OF HARMING ANYONE ELSE?: NO

## 2024-02-12 SDOH — SOCIAL STABILITY: SOCIAL INSECURITY: ARE YOU OR HAVE YOU BEEN THREATENED OR ABUSED PHYSICALLY, EMOTIONALLY, OR SEXUALLY BY ANYONE?: NO

## 2024-02-12 SDOH — SOCIAL STABILITY: SOCIAL INSECURITY: ABUSE: ADULT

## 2024-02-12 SDOH — SOCIAL STABILITY: SOCIAL INSECURITY: DO YOU FEEL UNSAFE GOING BACK TO THE PLACE WHERE YOU ARE LIVING?: NO

## 2024-02-12 ASSESSMENT — PATIENT HEALTH QUESTIONNAIRE - PHQ9
1. LITTLE INTEREST OR PLEASURE IN DOING THINGS: NOT AT ALL
SUM OF ALL RESPONSES TO PHQ9 QUESTIONS 1 & 2: 0
2. FEELING DOWN, DEPRESSED OR HOPELESS: NOT AT ALL

## 2024-02-12 ASSESSMENT — PAIN SCALES - GENERAL
PAINLEVEL_OUTOF10: 10 - WORST POSSIBLE PAIN
PAINLEVEL_OUTOF10: 7
PAINLEVEL_OUTOF10: 8
PAINLEVEL_OUTOF10: 10 - WORST POSSIBLE PAIN

## 2024-02-12 ASSESSMENT — COGNITIVE AND FUNCTIONAL STATUS - GENERAL
DAILY ACTIVITIY SCORE: 24
MOBILITY SCORE: 24
MOBILITY SCORE: 24
DAILY ACTIVITIY SCORE: 24
PATIENT BASELINE BEDBOUND: NO

## 2024-02-12 ASSESSMENT — PAIN - FUNCTIONAL ASSESSMENT
PAIN_FUNCTIONAL_ASSESSMENT: 0-10

## 2024-02-12 ASSESSMENT — ACTIVITIES OF DAILY LIVING (ADL)
GROOMING: INDEPENDENT
HEARING - RIGHT EAR: FUNCTIONAL
FEEDING YOURSELF: INDEPENDENT
WALKS IN HOME: INDEPENDENT
ADEQUATE_TO_COMPLETE_ADL: YES
LACK_OF_TRANSPORTATION: NO
PATIENT'S MEMORY ADEQUATE TO SAFELY COMPLETE DAILY ACTIVITIES?: YES
DRESSING YOURSELF: INDEPENDENT
JUDGMENT_ADEQUATE_SAFELY_COMPLETE_DAILY_ACTIVITIES: YES
BATHING: INDEPENDENT
TOILETING: INDEPENDENT
HEARING - LEFT EAR: FUNCTIONAL

## 2024-02-12 ASSESSMENT — LIFESTYLE VARIABLES
HAVE YOU EVER FELT YOU SHOULD CUT DOWN ON YOUR DRINKING: NO
AUDIT-C TOTAL SCORE: 0
HOW MANY STANDARD DRINKS CONTAINING ALCOHOL DO YOU HAVE ON A TYPICAL DAY: PATIENT DOES NOT DRINK
HAVE PEOPLE ANNOYED YOU BY CRITICIZING YOUR DRINKING: NO
HOW OFTEN DO YOU HAVE 6 OR MORE DRINKS ON ONE OCCASION: NEVER
AUDIT-C TOTAL SCORE: 0
SKIP TO QUESTIONS 9-10: 1
EVER FELT BAD OR GUILTY ABOUT YOUR DRINKING: NO
HOW OFTEN DO YOU HAVE A DRINK CONTAINING ALCOHOL: NEVER
EVER HAD A DRINK FIRST THING IN THE MORNING TO STEADY YOUR NERVES TO GET RID OF A HANGOVER: NO

## 2024-02-12 ASSESSMENT — PAIN DESCRIPTION - LOCATION: LOCATION: CHEST
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
## 2024-02-12 NOTE — H&P
History Of Present Illness  Joellen Narayan is a 23 y.o. 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     Of note, patient last admitted 12/19-12/24 for sickle cell pain crisis, and patient last presented to ED for sickle cell pain crisis on 12/25 and 1/28.     The RLE pain and chest pain are both consistent with his typical sickle cell pain. Took his home pain meds without relief. Patient states he has some SOB sometimes, consistent with his usual sickle cell crises, and oxygen helps. Patient states he has NOT had oxygen at home for at least 2 or 3 years now. Patient denies any HA, dizziness/lightheadedness, fevers/chills, cough, congestion, rhinorrhea, sore throat, palpitations, abdominal pain, n/v/d/c, hematuria, hematochezia, melena, or sick contacts. ROS otherwise negative.      ED course:   - EKG (2/11): NSR TWI in III, no JUDY or STD c/w prior. CXR (2/11): neg for acute process. Troponin 9.   - s/p Benadryl 25mg PO, Zofran 4mg PO, dilaudid 1mg subq, toradol 15mg IV, dilaudid 1mg IV, dilaudid 1mg IV      Past Medical History  He has a past medical history of Corrosion of unspecified body region, unspecified degree (12/31/2014), Impetigo (01/04/2024), Personal history of diseases of the blood and blood-forming organs and certain disorders involving the immune mechanism, Personal history of other (healed) physical injury and trauma (01/03/2015), Personal history of other diseases of the circulatory system, Personal history of other diseases of the circulatory system, Rash and other nonspecific skin eruption (09/09/2014), and Sickle-cell disease with pain (CMS/HCC)  "(12/19/2023).    Surgical History  He has a past surgical history that includes IR CVC tunneled (6/21/2022); CT guided percutaneous biopsy LYMPH node superficial (11/18/2022); and CT guided percutaneous biopsy retroperitoneum (11/30/2023).      Family History  - brother = stage 4 lung cancer  - mom = sickle cell trait  - dad = sickle cell trait    Social History  He reports that he has never smoked. He has been exposed to tobacco smoke. He has never used smokeless tobacco. He reports that he does not drink alcohol and does not use drugs.     Allergies  Amoxicillin and Ceftriaxone     Physical Exam  Constitutional:       General: He is not in acute distress.  HENT:      Head: Normocephalic and atraumatic.      Nose: Nose normal.      Mouth/Throat:      Mouth: Mucous membranes are moist.      Pharynx: Oropharynx is clear.   Eyes:      Extraocular Movements: Extraocular movements intact.      Pupils: Pupils are equal, round, and reactive to light.   Cardiovascular:      Rate and Rhythm: Normal rate and regular rhythm.   Pulmonary:      Effort: Pulmonary effort is normal. No respiratory distress.      Breath sounds: Normal breath sounds.   Abdominal:      General: Bowel sounds are normal.      Palpations: Abdomen is soft.      Tenderness: There is no abdominal tenderness.   Musculoskeletal:         General: Normal range of motion.      Right lower leg: No edema.      Left lower leg: No edema.   Skin:     General: Skin is warm and dry.   Neurological:      General: No focal deficit present.      Mental Status: He is alert and oriented to person, place, and time.   Psychiatric:         Mood and Affect: Mood normal.         Behavior: Behavior normal.        Last Recorded Vitals  Blood pressure 116/63, pulse 65, temperature 36.6 °C (97.9 °F), resp. rate 16, height 1.854 m (6' 1\"), weight 73 kg (161 lb), SpO2 96 %.     Assessment/Plan   Principal Problem:    Sickle cell crisis (CMS/HCC)    Joellen Narayan is a 23 y.o. 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 Started IV dilaudid and toradol for pain management. DC pending pain control.     # Hgb SS with pain  - c/b dactylitis in infancy, mild splenic sequestration in 2001, priapism, acute chest syndrome last in 2/2023)  - Has been following with sickle cell clinic (Renee Barrera), last seen on 1/23/24  - not on any disease modifying medications (per outpatient note 1/23/24, was sent oxybryta rx for DMT but denied by insurance, must trial endari first, sent rx for endari but PATIENT HAS NEVER TAKEN)  - Hgb baseline ~8.5, Hgb 8.1 (2/11)  - Tbili baseline fluctuates ~5-13, Tbili 8.1 (2/11)  - LDH baseline fluctuates but ~500,  (2/11)  - OARRS reviewed, no aberrancies  - No current care path, reviewed [facility] and [facility]  - On admit started IV dilaudid 2mg q2hrs PRN severe pain  - On admit started IV Toradol 30mg q6hrs x3 days (12/19-12/22) with Protonix for PPI prophy  - continue daily folic acid  - CXR  (2/11): negative for acute process  - Hgb S (2/12): pending  - Utox (2/12): pending  - PO Zofran PRN and PO Compazine PRN for opioid-induced nausea, PO Benadryl PRN for opioid-induced pruritis  - Bowel regimen for opioid-induced constipation with DocuSenna 2tabs BID and Miralax daily     #hx of nocturnal oxygen dependence  #hx of hypoxia on room air  - improves with oxygen supplementation  - has not had home oxygen for 2-3 years   - outpatient sickle cell provider placed referral to pulm, has apt on 2/21/24  - O2 ordered on admit for comfort, sickle cell pain     #nodular lymphocyte predominant Hodgkins lymphoma (NLPHL)   - Enlarged  lymph nodes noted 4/1/22  - RUQ US (11/14/22) with mildly enlarged LNs in the region of the kavin hepatis  - MRI liver (11/16/22) showed re-demonstration of bulky retroperitoneal lymphadenopathy and kavin hepatic lymphadenopathy    - (11/18/22) lymph node biopsy showed atypical lymphoid infiltrate. Reviewed by Hemepath board, insufficient for lymphoma diagnosis  - PET/CT (12/6/22) showing retroperitoneal lymphadenopathy  - Followed up with Dr. Stoll (12/16/22) with plan for surg/onc consult for large tissue bx but patient missed apt and was lost to follow up  - Requested new apt with Dr. Ronnie Marte 6/19/23, patient was not seen and lost to follow up  - CT a/p (11/28/23) increased size of retroperitoneal lymph nodes reflecting extramedullary hematopoiesis I/s/o sickle cell vs lymphoma  - Paraaortic LN bx via IR (11/30/23) consistent with NLPHL. Flow: no clonal B cell or T cell population identified, lymphocyte 95%, CD3+CD4+ 68%, CD3+CD8+ 7%, CD19+ 24%  - elevated LDH/bili partially from sickle cell disease   - chemotherapy (R-CHOP) was discussed with primary oncologist Dr. Stoll, and decision was made to simplify his chemotherapy to Rituxan and prednisone q 3 weeks mainly due to frequent sickle cell crisis  - current chemo regimen: rituxan and prednisone q3 weeks (C1 1/18/24, C2 2/8/24, C3 due 2/29/24)  - continue allopurinol for TLS prophy     #DVT prophy: Lovenox SQ, encourage ambulation     DISPO:  - Full Code  - DC pending pain management  - Sickle Cell FUV (Renee Barrera) on 3/20.  Infusion for C3 ritux on 2/29.  Gastro NPV and Pulm NPV on 2/21       I spent 90 minutes in the professional and overall care of this patient.      Petra Barnett PA-C

## 2024-02-12 NOTE — SIGNIFICANT EVENT
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 CXR (2/11) negative for acute process. Hgb and lysis labs within baseline. Admitted for pain management of uncomplicated sickle cell pain crisis. Started on IV dilaudid for pain management. DC pending improvement in pain.     # Hgb SS with pain  - Follows in  sickle cell clinic, last seen on 1/23/24  - Not on any disease modifying medications (per outpatient note 1/23/24, was sent oxybryta rx for DMT but denied by insurance, must trial endari first, sent rx for endari but patient has yet to start it)  - Hgb baseline ~8.5, Hgb 8.1 (2/11)--> 7 (2/12); give blood 2/13 if doesn't improve  - Tbili baseline fluctuates ~5-13, Tbili 8.1 (2/11)  - LDH baseline fluctuates but ~500,  (2/11)  - OARRS reviewed, no aberrancies  - No current care path  - On admit started IV dilaudid 2mg q2hrs PRN severe pain (2/12-)  - Started IV Toradol 30mg q6hrs x3 days (12/19-12/22) with Protonix for PPI prophy  - Continue folic acid 1mg daily  - CXR  (2/11): negative for acute process  - Hgb S (2/12): pending  - Utox (2/12): +cannabis  - PO Zofran PRN for opioid-induced nausea  - PO Benadryl PRN for opioid-induced pruritis  - Bowel regimen for opioid-induced constipation with DocuSenna 2tabs BID and Miralax daily      # Nodular lymphocyte predominant Hodgkins lymphoma (NLPHL)   - Enlarged lymph nodes noted 4/1/22  - RUQ US (11/14/22) with mildly enlarged LNs in the region of the kavin hepatis  - MRI liver (11/16/22) showed re-demonstration of bulky retroperitoneal lymphadenopathy and kavin hepatic  lymphadenopathy    - (11/18/22) lymph node biopsy showed atypical lymphoid infiltrate. Reviewed by Hemepath board, insufficient for lymphoma diagnosis  - PET/CT (12/6/22) showing retroperitoneal lymphadenopathy  - Followed up with Dr. Stoll (12/16/22) with plan for surg/onc consult for large tissue bx but patient missed apt and was lost to follow up  - Requested new apt with Dr. Ronnie Marte 6/19/23, patient was not seen and lost to follow up  - CT a/p (11/28/23) increased size of retroperitoneal lymph nodes reflecting extramedullary hematopoiesis I/s/o sickle cell vs lymphoma  - Paraaortic LN bx via IR (11/30/23) consistent with NLPHL. Flow: no clonal B cell or T cell population identified, lymphocyte 95%, CD3+CD4+ 68%, CD3+CD8+ 7%, CD19+ 24%  - Elevated LDH/bili partially from sickle cell disease   - Chemotherapy (R-CHOP) was discussed with primary oncologist Dr. Stoll, and decision was made to simplify his chemotherapy to Rituxan and prednisone q3 weeks mainly due to frequent sickle cell crisis  - Current chemo regimen: rituxan and prednisone q3 weeks (C1 1/18/24, C2 2/8/24, C3 due 2/29/24)  - Continue Allopurinol 300mg daily for TLS prophy     # Hx of nocturnal oxygen dependence and hypoxia on room air  - Improves with oxygen supplementation  - Has not had home oxygen for 2-3 years   - Has pulm apt on 2/21/24  - O2 ordered on admit for comfort (no hypoxia)    DVT prophy: Lovenox subcutaneous, SCDs, encourage ambulation     DISPO:  - Full Code  - DC pending improvement in pain  - Sickle Cell FUV (Renee Barrera) on 3/20.  Infusion for C3 ritux on 2/29. Gastro NPV and Pulm NPV on 2/21    I spent >60 minutes on the professional and overall care of this patient    Assessment and plan discussed with attending physician Dr. James Harper, APRN-CNP

## 2024-02-12 NOTE — PROGRESS NOTES
Pharmacy Medication History Review    Joellen Narayan is a 23 y.o. male admitted for Sickle cell crisis (CMS/[facility]). Pharmacy reviewed the patient's tkuqg-[id number] medications and allergies for accuracy.    The list below reflects the updated PTA list.   Comments regarding how patient may be taking medications differently can be found in the Admit Orders Activity  Prior to Admission Medications   Prescriptions Last Dose Informant Patient Reported?   OLANZapine (ZyPREXA) 5 mg tablet Past Week Self No   Sig: Take 1 tablet (5 mg) by mouth once daily at bedtime For 4 days starting the evening of treatment.   allopurinol (Zyloprim) 300 mg tablet 2/12/2024 Self No   Sig: Take 1 tablet (300 mg) by mouth once daily.   folic acid (Folvite) 1 mg tablet 2/12/2024 Self No   Sig: Take 1 tablet (1 mg) by mouth once daily.   glutamine, sickle cell, (Endari) 5 gram powder in packet Not started yet Self No   Sig: Take 15 g by mouth 2 times a day.   ondansetron (Zofran) 8 mg tablet Unknown Self No   Sig: Take 1 tablet (8 mg) by mouth every 8 hours if needed for nausea or vomiting.   polyethylene glycol (Glycolax, Miralax) 17 gram packet Unknown Self Yes   Sig: Take 17 g by mouth once daily.   predniSONE (Deltasone) 50 mg tablet 2/12/2024 Self No   Sig: Take 2 tablets (100 mg) by mouth on Days 1, 2, 3, 4, and 5 of treatment cycle.   prochlorperazine (Compazine) 10 mg tablet Unknown Self No   Sig: Take 1 tablet (10 mg) by mouth every 6 hours if needed for nausea or vomiting.   sennosides-docusate sodium (Promise-Colace) 8.6-50 mg tablet Unknown Self Yes   Sig: Take 2 tablets by mouth every 12 hours.      Facility-Administered Medications: None        The list below reflects the updated allergy list. Please review each documented allergy for additional clarification and justification.  Allergies  Reviewed by Stephan Chung RPh on 2/12/2024        Severity Reactions Comments    Amoxicillin Low Hives     Ceftriaxone Low Hives, Rash        "      Patient accepts M2B at discharge.     Sources:   Pt interview (knows medications well).   Dispense hx  OARRS   2/9/24 hem onc infusion note    Additional Comments:  Prednisone - needs 100 mg x1 on 2/13/24 to complete 5th day of treatment cycle. Rituximab infusion on 2/9/24.   Glutamine (Endari) - pending prior auth with [facility]. Pt insurance limitation requires trial of Endari prior to trying voxelotor (Oxbryta).        Stephan Chung, PharmD  Transitions of Care Pharmacist    Secure Chat preferred   If no response call [phone] or Vocera \"Med Rec\"   "

## 2024-02-13 ENCOUNTER — TELEPHONE (OUTPATIENT)
Dept: ADMISSION | Facility: HOSPITAL | Age: 24
End: 2024-02-13
Payer: COMMERCIAL

## 2024-02-13 DIAGNOSIS — D57.00 SICKLE CELL DISEASE WITH CRISIS (MULTI): ICD-10-CM

## 2024-02-13 LAB
ABO GROUP (TYPE) IN BLOOD: NORMAL
ALBUMIN SERPL BCP-MCNC: 4 G/DL (ref 3.4–5)
ALP SERPL-CCNC: 70 U/L (ref 33–120)
ALT SERPL W P-5'-P-CCNC: 15 U/L (ref 10–52)
ANION GAP SERPL CALC-SCNC: 11 MMOL/L (ref 10–20)
ANTIBODY SCREEN: NORMAL
AST SERPL W P-5'-P-CCNC: 24 U/L (ref 9–39)
BASO STIPL BLD QL SMEAR: PRESENT
BASOPHILS # BLD MANUAL: 0.09 X10*3/UL (ref 0–0.1)
BASOPHILS NFR BLD MANUAL: 0.9 %
BILIRUB SERPL-MCNC: 7.2 MG/DL (ref 0–1.2)
BUN SERPL-MCNC: 8 MG/DL (ref 6–23)
CALCIUM SERPL-MCNC: 9.4 MG/DL (ref 8.6–10.6)
CHLORIDE SERPL-SCNC: 108 MMOL/L (ref 98–107)
CO2 SERPL-SCNC: 27 MMOL/L (ref 21–32)
CREAT SERPL-MCNC: 0.5 MG/DL (ref 0.5–1.3)
EGFRCR SERPLBLD CKD-EPI 2021: >90 ML/MIN/1.73M*2
EOSINOPHIL # BLD MANUAL: 0 X10*3/UL (ref 0–0.7)
EOSINOPHIL NFR BLD MANUAL: 0 %
ERYTHROCYTE [DISTWIDTH] IN BLOOD BY AUTOMATED COUNT: 25.2 % (ref 11.5–14.5)
GLUCOSE SERPL-MCNC: 82 MG/DL (ref 74–99)
HCT VFR BLD AUTO: 18.7 % (ref 41–52)
HEMOGLOBIN A2: 4.4 % (ref 2–3.5)
HEMOGLOBIN F: 2.2 % (ref 0–2)
HEMOGLOBIN IDENTIFICATION INTERPRETATION: ABNORMAL
HEMOGLOBIN S: 93.4 %
HGB BLD-MCNC: 6.7 G/DL (ref 13.5–17.5)
HGB RETIC QN: 30 PG (ref 28–38)
HYPOCHROMIA BLD QL SMEAR: NORMAL
IMM GRANULOCYTES # BLD AUTO: 0.08 X10*3/UL (ref 0–0.7)
IMM GRANULOCYTES NFR BLD AUTO: 0.8 % (ref 0–0.9)
IMMATURE RETIC FRACTION: 32.6 %
LDH SERPL L TO P-CCNC: 282 U/L (ref 84–246)
LYMPHOCYTES # BLD MANUAL: 3.9 X10*3/UL (ref 1.2–4.8)
LYMPHOCYTES NFR BLD MANUAL: 38.6 %
MCH RBC QN AUTO: 28 PG (ref 26–34)
MCHC RBC AUTO-ENTMCNC: 35.8 G/DL (ref 32–36)
MCV RBC AUTO: 78 FL (ref 80–100)
METAMYELOCYTES # BLD MANUAL: 0.09 X10*3/UL
METAMYELOCYTES NFR BLD MANUAL: 0.9 %
MONOCYTES # BLD MANUAL: 0.71 X10*3/UL (ref 0.1–1)
MONOCYTES NFR BLD MANUAL: 7 %
NEUTS SEG # BLD MANUAL: 5.31 X10*3/UL (ref 1.2–7)
NEUTS SEG NFR BLD MANUAL: 52.6 %
NRBC BLD-RTO: 2.3 /100 WBCS (ref 0–0)
OVALOCYTES BLD QL SMEAR: NORMAL
PATH REVIEW-CBC DIFFERENTIAL: NORMAL
PATH REVIEW-HGB IDENTIFICATION: ABNORMAL
PLATELET # BLD AUTO: 319 X10*3/UL (ref 150–450)
POLYCHROMASIA BLD QL SMEAR: NORMAL
POTASSIUM SERPL-SCNC: 3.7 MMOL/L (ref 3.5–5.3)
PROT SERPL-MCNC: 6.2 G/DL (ref 6.4–8.2)
RBC # BLD AUTO: 2.39 X10*6/UL (ref 4.5–5.9)
RBC MORPH BLD: NORMAL
RETICS #: 0.37 X10*6/UL (ref 0.02–0.12)
RETICS/RBC NFR AUTO: 15.6 % (ref 0.5–2)
RH FACTOR (ANTIGEN D): NORMAL
SCHISTOCYTES BLD QL SMEAR: NORMAL
SICKLE CELLS BLD QL SMEAR: NORMAL
SODIUM SERPL-SCNC: 142 MMOL/L (ref 136–145)
TARGETS BLD QL SMEAR: NORMAL
TOTAL CELLS COUNTED BLD: 114
WBC # BLD AUTO: 10.1 X10*3/UL (ref 4.4–11.3)

## 2024-02-13 PROCEDURE — 99233 SBSQ HOSP IP/OBS HIGH 50: CPT | Performed by: INTERNAL MEDICINE

## 2024-02-13 PROCEDURE — 36430 TRANSFUSION BLD/BLD COMPNT: CPT

## 2024-02-13 PROCEDURE — 36415 COLL VENOUS BLD VENIPUNCTURE: CPT

## 2024-02-13 PROCEDURE — 85027 COMPLETE CBC AUTOMATED: CPT

## 2024-02-13 PROCEDURE — 2500000004 HC RX 250 GENERAL PHARMACY W/ HCPCS (ALT 636 FOR OP/ED)

## 2024-02-13 PROCEDURE — 85007 BL SMEAR W/DIFF WBC COUNT: CPT

## 2024-02-13 PROCEDURE — 85045 AUTOMATED RETICULOCYTE COUNT: CPT

## 2024-02-13 PROCEDURE — 83615 LACTATE (LD) (LDH) ENZYME: CPT

## 2024-02-13 PROCEDURE — 80053 COMPREHEN METABOLIC PANEL: CPT

## 2024-02-13 PROCEDURE — 2500000005 HC RX 250 GENERAL PHARMACY W/O HCPCS

## 2024-02-13 PROCEDURE — 2500000001 HC RX 250 WO HCPCS SELF ADMINISTERED DRUGS (ALT 637 FOR MEDICARE OP)

## 2024-02-13 PROCEDURE — 1170000001 HC PRIVATE ONCOLOGY ROOM DAILY

## 2024-02-13 PROCEDURE — 2500000002 HC RX 250 W HCPCS SELF ADMINISTERED DRUGS (ALT 637 FOR MEDICARE OP, ALT 636 FOR OP/ED)

## 2024-02-13 PROCEDURE — 86901 BLOOD TYPING SEROLOGIC RH(D): CPT

## 2024-02-13 PROCEDURE — 86922 COMPATIBILITY TEST ANTIGLOB: CPT

## 2024-02-13 PROCEDURE — P9040 RBC LEUKOREDUCED IRRADIATED: HCPCS

## 2024-02-13 RX ORDER — GLUTAMINE 5 G/1
15 POWDER, FOR SOLUTION ORAL 2 TIMES DAILY
Qty: 180 EACH | Refills: 3 | Status: SHIPPED | OUTPATIENT
Start: 2024-02-13 | End: 2024-03-19 | Stop reason: SDUPTHER

## 2024-02-13 RX ADMIN — HYDROMORPHONE HYDROCHLORIDE 2 MG: 2 INJECTION, SOLUTION INTRAMUSCULAR; INTRAVENOUS; SUBCUTANEOUS at 06:09

## 2024-02-13 RX ADMIN — ALLOPURINOL 300 MG: 300 TABLET ORAL at 08:44

## 2024-02-13 RX ADMIN — KETOROLAC TROMETHAMINE 30 MG: 30 INJECTION, SOLUTION INTRAMUSCULAR; INTRAVENOUS at 13:59

## 2024-02-13 RX ADMIN — Medication 2 L/MIN: at 09:37

## 2024-02-13 RX ADMIN — FOLIC ACID 1 MG: 1 TABLET ORAL at 08:44

## 2024-02-13 RX ADMIN — PREDNISONE 100 MG: 20 TABLET ORAL at 08:44

## 2024-02-13 RX ADMIN — PANTOPRAZOLE SODIUM 40 MG: 40 TABLET, DELAYED RELEASE ORAL at 06:09

## 2024-02-13 RX ADMIN — HYDROMORPHONE HYDROCHLORIDE 2 MG: 2 INJECTION, SOLUTION INTRAMUSCULAR; INTRAVENOUS; SUBCUTANEOUS at 13:59

## 2024-02-13 RX ADMIN — KETOROLAC TROMETHAMINE 30 MG: 30 INJECTION, SOLUTION INTRAMUSCULAR; INTRAVENOUS at 08:45

## 2024-02-13 RX ADMIN — HYDROMORPHONE HYDROCHLORIDE 2 MG: 2 INJECTION, SOLUTION INTRAMUSCULAR; INTRAVENOUS; SUBCUTANEOUS at 17:10

## 2024-02-13 RX ADMIN — KETOROLAC TROMETHAMINE 30 MG: 30 INJECTION, SOLUTION INTRAMUSCULAR; INTRAVENOUS at 02:07

## 2024-02-13 RX ADMIN — HYDROMORPHONE HYDROCHLORIDE 2 MG: 2 INJECTION, SOLUTION INTRAMUSCULAR; INTRAVENOUS; SUBCUTANEOUS at 10:09

## 2024-02-13 RX ADMIN — SENNOSIDES AND DOCUSATE SODIUM 2 TABLET: 8.6; 5 TABLET ORAL at 20:21

## 2024-02-13 RX ADMIN — SENNOSIDES AND DOCUSATE SODIUM 2 TABLET: 8.6; 5 TABLET ORAL at 08:44

## 2024-02-13 RX ADMIN — ENOXAPARIN SODIUM 40 MG: 100 INJECTION SUBCUTANEOUS at 08:44

## 2024-02-13 RX ADMIN — KETOROLAC TROMETHAMINE 30 MG: 30 INJECTION, SOLUTION INTRAMUSCULAR; INTRAVENOUS at 20:21

## 2024-02-13 ASSESSMENT — PAIN - FUNCTIONAL ASSESSMENT
PAIN_FUNCTIONAL_ASSESSMENT: 0-10

## 2024-02-13 ASSESSMENT — COGNITIVE AND FUNCTIONAL STATUS - GENERAL
DAILY ACTIVITIY SCORE: 24
MOBILITY SCORE: 24

## 2024-02-13 ASSESSMENT — PAIN SCALES - GENERAL
PAINLEVEL_OUTOF10: 7
PAINLEVEL_OUTOF10: 5 - MODERATE PAIN
PAINLEVEL_OUTOF10: 4
PAINLEVEL_OUTOF10: 3
PAINLEVEL_OUTOF10: 7
PAINLEVEL_OUTOF10: 8
PAINLEVEL_OUTOF10: 8
PAINLEVEL_OUTOF10: 5 - MODERATE PAIN
PAINLEVEL_OUTOF10: 6
PAINLEVEL_OUTOF10: 5 - MODERATE PAIN

## 2024-02-13 NOTE — CARE PLAN
Problem: Pain  Goal: My pain/discomfort is manageable  Outcome: Progressing     Problem: Safety  Goal: I will remain free of falls  Outcome: Progressing     Problem: Daily Care  Goal: Daily care needs are met  Outcome: Progressing     Problem: Psychosocial Needs  Goal: Demonstrates ability to cope with hospitalization/illness  Outcome: Progressing  Goal: Collaborate with me, my family, and caregiver to identify my specific goals  Outcome: Progressing     Problem: Discharge Barriers  Goal: My discharge needs are met  Outcome: Progressing     Problem: Pain  Goal: Takes deep breaths with improved pain control throughout the shift  Outcome: Progressing  Goal: Turns in bed with improved pain control throughout the shift  Outcome: Progressing  Goal: Walks with improved pain control throughout the shift  Outcome: Progressing  Goal: Performs ADL's with improved pain control throughout shift  Outcome: Progressing  Goal: Participates in PT with improved pain control throughout the shift  Outcome: Progressing  Goal: Free from opioid side effects throughout the shift  Outcome: Progressing  Goal: Free from acute confusion related to pain meds throughout the shift  Outcome: Progressing       The clinical goals for the shift include pt will report pain less than 8/10 throughout shift    Over the shift, the patient remained safe and free from injury. Had chest pain, medicated PRN. Vitals stable overnight. No acute events overnight

## 2024-02-13 NOTE — PROGRESS NOTES
"Joellen Narayan is a 23 y.o. male on day 1 of admission presenting with Sickle cell crisis (CMS/HCC).    Subjective   Pt seen at bedside this AM. Reports pain is improved since yesterday down to 5/10, we discussed spacing frequency out to q3, pt agreeable to this. He reports pain is improved in his R leg since admission just now in his chest. We discussed awaiting labs.    Denies nausea, vomiting, fever, chills, SOB, constipation, diarrhea, headache, and bleeding.        Objective     Physical Exam  Constitutional:       Appearance: Normal appearance.   HENT:      Head: Normocephalic.      Right Ear: External ear normal.      Left Ear: External ear normal.      Nose: Nose normal.   Eyes:      Extraocular Movements: Extraocular movements intact.      Conjunctiva/sclera: Conjunctivae normal.      Pupils: Pupils are equal, round, and reactive to light.   Cardiovascular:      Rate and Rhythm: Normal rate and regular rhythm.   Pulmonary:      Effort: Pulmonary effort is normal.      Breath sounds: Normal breath sounds.   Abdominal:      General: Bowel sounds are normal.      Palpations: Abdomen is soft.   Musculoskeletal:         General: Normal range of motion.      Cervical back: Normal range of motion and neck supple.   Skin:     General: Skin is warm and dry.   Neurological:      General: No focal deficit present.      Mental Status: He is alert and oriented to person, place, and time. Mental status is at baseline.   Psychiatric:         Mood and Affect: Mood normal.         Behavior: Behavior normal.         Thought Content: Thought content normal.         Last Recorded Vitals  Blood pressure 135/76, pulse 77, temperature 36.5 °C (97.7 °F), temperature source Temporal, resp. rate 16, height 1.865 m (6' 1.43\"), weight 72.6 kg (160 lb 0.9 oz), SpO2 95 %.  Intake/Output last 3 Shifts:  No intake/output data recorded.    Relevant Results       .Scheduled medications  allopurinol, 300 mg, oral, Daily  enoxaparin, 40 mg, " subcutaneous, Daily  folic acid, 1 mg, oral, Daily  HYDROmorphone, 1 mg, intramuscular, Once  ketorolac, 15 mg, intramuscular, Once  ketorolac, 30 mg, intravenous, q6h  pantoprazole, 40 mg, oral, Daily before breakfast  polyethylene glycol, 17 g, oral, Daily  sennosides-docusate sodium, 2 tablet, oral, q12h      Continuous medications     PRN medications  PRN medications: diphenhydrAMINE, HYDROmorphone, naloxone, ondansetron, oxygen    .  Results for orders placed or performed during the hospital encounter of 02/11/24 (from the past 24 hour(s))   CBC and Auto Differential   Result Value Ref Range    WBC      RBC      Hemoglobin      Hematocrit      MCV      MCHC      RDW      Platelets      Neutrophils %      Immature Granulocytes %, Automated      Lymphocytes %      Monocytes %      Eosinophils %      Basophils %      Neutrophils Absolute      Lymphocytes Absolute      Monocytes Absolute      Eosinophils Absolute      Basophils Absolute     Reticulocytes   Result Value Ref Range    Retic % 15.6 (H) 0.5 - 2.0 %    Retic Absolute 0.374 (H) 0.022 - 0.118 x10*6/uL    Reticulocyte Hemoglobin 30 28 - 38 pg    Immature Retic fraction 32.6 (H) <=16.0 %           Assessment/Plan   Principal Problem:    Sickle cell crisis (CMS/HCC)    Joellen Narayan is a 23 year-old male PMH of newly diagnosed nodular lymphocyte predominant Hodgkins lymphoma (NLPHL) (on rituxan/prednisone), 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 CXR (2/11) w/o evidence of acute process. Hgb and lysis labs within baseline. Admitted for pain management. Started on IV dilaudid for pain management (2/12- current). 2/13 Hg 6.7, x1u PRBC pending. DC pending improvement in pain.     # Hgb SS with  acute on chronic pain  - Follows in  sickle cell clinic, last seen on 1/23/24  - Not on any disease modifying medications (per outpatient note 1/23/24, was sent oxybryta rx for DMT but denied by insurance, must trial endari first, sent rx for endari but patient has yet to start it)  - Hgb baseline ~8.5, Hgb 8.1 (2/11)--> 6.7 (2/13), x1u PRBC ordered   - Tbili baseline fluctuates baseline~5-8, Tbili 7.2 (2/13)  - LDH baseline fluctuates but ~500,  (2/13)  - OARRS reviewed, no aberrant behavior noted   - No current care path  - s/p IVP dilaudid 2mg q2hrs PRN severe pain (2/12-2/13)  - 2/13 started IVP dilaudid 2mg Q3 PRN severe pain   - c/w IV Toradol 30mg q6hrs x3 days (2/12-2/15) with Protonix for PPI support   - c/w home folic acid 1mg daily  - CXR (2/11) w/o evidence of acute process  - Hgb S (2/12) 93.4%   - Utox (2/12) +cannabis  - PO Zofran PRN for opioid-induced nausea  - PO Benadryl PRN for opioid-induced pruritis  - Bowel regimen for opioid-induced constipation with DocuSenna 2tabs BID and Miralax daily; LBM 2/13      # Nodular lymphocyte predominant Hodgkins lymphoma (NLPHL)   - Enlarged lymph nodes noted 4/1/22  - RUQ US (11/14/22) with mildly enlarged LNs in the region of the kavin hepatis  - MRI liver (11/16/22) showed re-demonstration of bulky retroperitoneal lymphadenopathy and kavin hepatic lymphadenopathy    - (11/18/22) lymph node biopsy showed atypical lymphoid infiltrate. Reviewed by Hemepath board, insufficient for lymphoma diagnosis  - PET/CT (12/6/22) showing retroperitoneal lymphadenopathy  - Followed up with Dr. Stoll (12/16/22) with plan for surg/onc consult for large tissue bx but patient missed apt and was lost to follow up  - Requested new apt with Dr. Ronnie Marte 6/19/23, patient was not seen and lost to follow up  - CT a/p (11/28/23) increased size of retroperitoneal lymph nodes reflecting extramedullary hematopoiesis I/s/o sickle cell vs lymphoma  - Paraaortic LN bx via IR  (11/30/23) consistent with NLPHL. Flow: no clonal B cell or T cell population identified, lymphocyte 95%, CD3+CD4+ 68%, CD3+CD8+ 7%, CD19+ 24%  - Elevated LDH/bili partially from sickle cell disease   - Chemotherapy (R-CHOP) was discussed with primary oncologist Dr. Stoll, and decision was made to simplify his chemotherapy to Rituxan and prednisone q3 weeks mainly due to frequent sickle cell crisis  - Current chemo regimen: rituxan and prednisone q3 weeks (C1 1/18/24, C2 2/8/24, C3 due 2/29/24)  - s/p 100mg Prednisone once on 2/13 per above regimen and as verified with pharmacy for timeline  - c/w home Allopurinol 300mg daily for TLS prophy     # Hx of nocturnal oxygen dependence and hypoxia on room air  - Improves with oxygen supplementation  - Has not had home oxygen for 2-3 years   - Has pulm apt on 2/21/24  - O2 ordered on admit for comfort (no hypoxia)  - will perform walking pulse ox closer to discharge      DVT prophy: Lovenox subcutaneous, SCDs, encourage ambulation     DISPO:  - Full Code  - DC home (with poss resumed O2) pending improvement in pain  - Sickle Cell FUV (Renee Barrera) on 3/20.  Infusion for C3 ritux on 2/29. Gastro NPV and Pulm NPV on 2/21     I spent >60 minutes on the professional and overall care of this patient     Assessment and plan discussed with attending physician Dr. James Kyle, APRN-CNP

## 2024-02-13 NOTE — TELEPHONE ENCOUNTER
[facility] calling in to request an increase in quantity of the Endari prescription.   Pharmacy is asking quantity to be increased from 60 to 180.

## 2024-02-14 ENCOUNTER — PHARMACY VISIT (OUTPATIENT)
Dept: PHARMACY | Facility: CLINIC | Age: 24
End: 2024-02-14
Payer: MEDICARE

## 2024-02-14 VITALS
HEART RATE: 74 BPM | SYSTOLIC BLOOD PRESSURE: 136 MMHG | BODY MASS INDEX: 21.21 KG/M2 | HEIGHT: 73 IN | RESPIRATION RATE: 18 BRPM | DIASTOLIC BLOOD PRESSURE: 81 MMHG | WEIGHT: 160.05 LBS | TEMPERATURE: 97.7 F | OXYGEN SATURATION: 92 %

## 2024-02-14 LAB
ALBUMIN SERPL BCP-MCNC: 4.1 G/DL (ref 3.4–5)
ALP SERPL-CCNC: 62 U/L (ref 33–120)
ALT SERPL W P-5'-P-CCNC: 17 U/L (ref 10–52)
ANION GAP SERPL CALC-SCNC: 16 MMOL/L (ref 10–20)
AST SERPL W P-5'-P-CCNC: 41 U/L (ref 9–39)
BASOPHILS # BLD AUTO: 0.02 X10*3/UL (ref 0–0.1)
BASOPHILS NFR BLD AUTO: 0.2 %
BILIRUB SERPL-MCNC: 5.6 MG/DL (ref 0–1.2)
BLOOD EXPIRATION DATE: NORMAL
BUN SERPL-MCNC: 7 MG/DL (ref 6–23)
BURR CELLS BLD QL SMEAR: NORMAL
CALCIUM SERPL-MCNC: 9.4 MG/DL (ref 8.6–10.6)
CHLORIDE SERPL-SCNC: 107 MMOL/L (ref 98–107)
CO2 SERPL-SCNC: 21 MMOL/L (ref 21–32)
CREAT SERPL-MCNC: 0.59 MG/DL (ref 0.5–1.3)
DISPENSE STATUS: NORMAL
EGFRCR SERPLBLD CKD-EPI 2021: >90 ML/MIN/1.73M*2
EOSINOPHIL # BLD AUTO: 0.1 X10*3/UL (ref 0–0.7)
EOSINOPHIL NFR BLD AUTO: 0.9 %
ERYTHROCYTE [DISTWIDTH] IN BLOOD BY AUTOMATED COUNT: 24.7 % (ref 11.5–14.5)
GLUCOSE SERPL-MCNC: 89 MG/DL (ref 74–99)
HCT VFR BLD AUTO: 24.8 % (ref 41–52)
HGB BLD-MCNC: 8.4 G/DL (ref 13.5–17.5)
HGB RETIC QN: 27 PG (ref 28–38)
HOWELL-JOLLY BOD BLD QL SMEAR: PRESENT
HYPOCHROMIA BLD QL SMEAR: NORMAL
IMM GRANULOCYTES # BLD AUTO: 0.15 X10*3/UL (ref 0–0.7)
IMM GRANULOCYTES NFR BLD AUTO: 1.3 % (ref 0–0.9)
IMMATURE RETIC FRACTION: 28 %
LDH SERPL L TO P-CCNC: 462 U/L (ref 84–246)
LYMPHOCYTES # BLD AUTO: 4.33 X10*3/UL (ref 1.2–4.8)
LYMPHOCYTES NFR BLD AUTO: 37.4 %
MCH RBC QN AUTO: 27.5 PG (ref 26–34)
MCHC RBC AUTO-ENTMCNC: 33.9 G/DL (ref 32–36)
MCV RBC AUTO: 81 FL (ref 80–100)
MONOCYTES # BLD AUTO: 0.61 X10*3/UL (ref 0.1–1)
MONOCYTES NFR BLD AUTO: 5.3 %
NEUTROPHILS # BLD AUTO: 6.38 X10*3/UL (ref 1.2–7.7)
NEUTROPHILS NFR BLD AUTO: 54.9 %
NRBC BLD-RTO: 1.9 /100 WBCS (ref 0–0)
PLATELET # BLD AUTO: 371 X10*3/UL (ref 150–450)
POLYCHROMASIA BLD QL SMEAR: NORMAL
POTASSIUM SERPL-SCNC: 4.6 MMOL/L (ref 3.5–5.3)
PRODUCT BLOOD TYPE: 5100
PRODUCT CODE: NORMAL
PROT SERPL-MCNC: 6.4 G/DL (ref 6.4–8.2)
RBC # BLD AUTO: 3.06 X10*6/UL (ref 4.5–5.9)
RBC MORPH BLD: NORMAL
RETICS #: 0.68 X10*6/UL (ref 0.02–0.12)
RETICS/RBC NFR AUTO: 22.2 % (ref 0.5–2)
SCHISTOCYTES BLD QL SMEAR: NORMAL
SICKLE CELLS BLD QL SMEAR: NORMAL
SODIUM SERPL-SCNC: 139 MMOL/L (ref 136–145)
TARGETS BLD QL SMEAR: NORMAL
UNIT ABO: NORMAL
UNIT NUMBER: NORMAL
UNIT RH: NORMAL
UNIT VOLUME: 350
WBC # BLD AUTO: 11.6 X10*3/UL (ref 4.4–11.3)
XM INTEP: NORMAL

## 2024-02-14 PROCEDURE — 2500000001 HC RX 250 WO HCPCS SELF ADMINISTERED DRUGS (ALT 637 FOR MEDICARE OP)

## 2024-02-14 PROCEDURE — 36415 COLL VENOUS BLD VENIPUNCTURE: CPT

## 2024-02-14 PROCEDURE — 2500000004 HC RX 250 GENERAL PHARMACY W/ HCPCS (ALT 636 FOR OP/ED)

## 2024-02-14 PROCEDURE — 83615 LACTATE (LD) (LDH) ENZYME: CPT

## 2024-02-14 PROCEDURE — 83021 HEMOGLOBIN CHROMOTOGRAPHY: CPT | Performed by: PHYSICIAN ASSISTANT

## 2024-02-14 PROCEDURE — 85045 AUTOMATED RETICULOCYTE COUNT: CPT

## 2024-02-14 PROCEDURE — RXMED WILLOW AMBULATORY MEDICATION CHARGE

## 2024-02-14 PROCEDURE — 85025 COMPLETE CBC W/AUTO DIFF WBC: CPT

## 2024-02-14 PROCEDURE — 83020 HEMOGLOBIN ELECTROPHORESIS: CPT | Performed by: PHYSICIAN ASSISTANT

## 2024-02-14 PROCEDURE — 99239 HOSP IP/OBS DSCHRG MGMT >30: CPT

## 2024-02-14 PROCEDURE — 84075 ASSAY ALKALINE PHOSPHATASE: CPT

## 2024-02-14 PROCEDURE — 2500000002 HC RX 250 W HCPCS SELF ADMINISTERED DRUGS (ALT 637 FOR MEDICARE OP, ALT 636 FOR OP/ED)

## 2024-02-14 RX ORDER — OXYCODONE HYDROCHLORIDE 5 MG/1
5 CAPSULE ORAL EVERY 6 HOURS PRN
Qty: 12 CAPSULE | Refills: 0 | Status: SHIPPED | OUTPATIENT
Start: 2024-02-14 | End: 2024-02-14 | Stop reason: HOSPADM

## 2024-02-14 RX ORDER — OXYCODONE HYDROCHLORIDE 5 MG/1
5 TABLET ORAL EVERY 6 HOURS PRN
Qty: 12 TABLET | Refills: 0 | Status: SHIPPED | OUTPATIENT
Start: 2024-02-14 | End: 2024-02-28 | Stop reason: HOSPADM

## 2024-02-14 RX ADMIN — ALLOPURINOL 300 MG: 300 TABLET ORAL at 10:12

## 2024-02-14 RX ADMIN — SENNOSIDES AND DOCUSATE SODIUM 2 TABLET: 8.6; 5 TABLET ORAL at 10:12

## 2024-02-14 RX ADMIN — KETOROLAC TROMETHAMINE 30 MG: 30 INJECTION, SOLUTION INTRAMUSCULAR; INTRAVENOUS at 10:11

## 2024-02-14 RX ADMIN — HYDROMORPHONE HYDROCHLORIDE 2 MG: 2 INJECTION, SOLUTION INTRAMUSCULAR; INTRAVENOUS; SUBCUTANEOUS at 20:00

## 2024-02-14 RX ADMIN — PANTOPRAZOLE SODIUM 40 MG: 40 TABLET, DELAYED RELEASE ORAL at 06:43

## 2024-02-14 RX ADMIN — FOLIC ACID 1 MG: 1 TABLET ORAL at 10:11

## 2024-02-14 RX ADMIN — KETOROLAC TROMETHAMINE 30 MG: 30 INJECTION, SOLUTION INTRAMUSCULAR; INTRAVENOUS at 20:01

## 2024-02-14 RX ADMIN — HYDROMORPHONE HYDROCHLORIDE 2 MG: 2 INJECTION, SOLUTION INTRAMUSCULAR; INTRAVENOUS; SUBCUTANEOUS at 14:24

## 2024-02-14 RX ADMIN — HYDROMORPHONE HYDROCHLORIDE 2 MG: 2 INJECTION, SOLUTION INTRAMUSCULAR; INTRAVENOUS; SUBCUTANEOUS at 06:43

## 2024-02-14 RX ADMIN — SENNOSIDES AND DOCUSATE SODIUM 2 TABLET: 8.6; 5 TABLET ORAL at 20:01

## 2024-02-14 RX ADMIN — KETOROLAC TROMETHAMINE 30 MG: 30 INJECTION, SOLUTION INTRAMUSCULAR; INTRAVENOUS at 15:20

## 2024-02-14 RX ADMIN — HYDROMORPHONE HYDROCHLORIDE 2 MG: 2 INJECTION, SOLUTION INTRAMUSCULAR; INTRAVENOUS; SUBCUTANEOUS at 01:40

## 2024-02-14 RX ADMIN — KETOROLAC TROMETHAMINE 30 MG: 30 INJECTION, SOLUTION INTRAMUSCULAR; INTRAVENOUS at 02:10

## 2024-02-14 RX ADMIN — ENOXAPARIN SODIUM 40 MG: 100 INJECTION SUBCUTANEOUS at 10:12

## 2024-02-14 ASSESSMENT — COGNITIVE AND FUNCTIONAL STATUS - GENERAL
DAILY ACTIVITIY SCORE: 24
MOBILITY SCORE: 24

## 2024-02-14 ASSESSMENT — PAIN - FUNCTIONAL ASSESSMENT
PAIN_FUNCTIONAL_ASSESSMENT: 0-10

## 2024-02-14 ASSESSMENT — PAIN SCALES - PAIN ASSESSMENT IN ADVANCED DEMENTIA (PAINAD)
BREATHING: NORMAL
FACIALEXPRESSION: SMILING OR INEXPRESSIVE
TOTALSCORE: 0
CONSOLABILITY: NO NEED TO CONSOLE
BODYLANGUAGE: RELAXED

## 2024-02-14 ASSESSMENT — PAIN SCALES - GENERAL
PAINLEVEL_OUTOF10: 9
PAINLEVEL_OUTOF10: 7
PAINLEVEL_OUTOF10: 6
PAINLEVEL_OUTOF10: 8
PAINLEVEL_OUTOF10: 7
PAINLEVEL_OUTOF10: 4
PAINLEVEL_OUTOF10: 5 - MODERATE PAIN

## 2024-02-14 ASSESSMENT — PAIN DESCRIPTION - LOCATION
LOCATION: CHEST
LOCATION: CHEST

## 2024-02-14 ASSESSMENT — PAIN SCALES - WONG BAKER: WONGBAKER_NUMERICALRESPONSE: NO HURT

## 2024-02-14 NOTE — CARE PLAN
The clinical goals for the shift include pt will remain safe and free from injury throughout shift 2/14/2024 1900      Problem: Pain  Goal: My pain/discomfort is manageable  Outcome: Progressing     Problem: Safety  Goal: I will remain free of falls  Outcome: Progressing     Problem: Daily Care  Goal: Daily care needs are met  Outcome: Progressing     Problem: Psychosocial Needs  Goal: Demonstrates ability to cope with hospitalization/illness  Outcome: Progressing  Goal: Collaborate with me, my family, and caregiver to identify my specific goals  Outcome: Progressing     Problem: Discharge Barriers  Goal: My discharge needs are met  Outcome: Progressing     Problem: Pain  Goal: Takes deep breaths with improved pain control throughout the shift  Outcome: Progressing  Goal: Turns in bed with improved pain control throughout the shift  Outcome: Progressing  Goal: Walks with improved pain control throughout the shift  Outcome: Progressing  Goal: Performs ADL's with improved pain control throughout shift  Outcome: Progressing  Goal: Participates in PT with improved pain control throughout the shift  Outcome: Progressing  Goal: Free from opioid side effects throughout the shift  Outcome: Progressing  Goal: Free from acute confusion related to pain meds throughout the shift  Outcome: Progressing

## 2024-02-14 NOTE — CARE PLAN
The patient's goals for the shift include      The clinical goals for the shift include Pt will remain HDS and VSS through shift      Problem: Pain  Goal: My pain/discomfort is manageable  Outcome: Progressing     Problem: Safety  Goal: I will remain free of falls  Outcome: Progressing     Problem: Daily Care  Goal: Daily care needs are met  Outcome: Progressing     Problem: Psychosocial Needs  Goal: Demonstrates ability to cope with hospitalization/illness  Outcome: Progressing  Goal: Collaborate with me, my family, and caregiver to identify my specific goals  Outcome: Progressing     Problem: Discharge Barriers  Goal: My discharge needs are met  Outcome: Progressing     Problem: Pain  Goal: Takes deep breaths with improved pain control throughout the shift  Outcome: Progressing  Goal: Turns in bed with improved pain control throughout the shift  Outcome: Progressing  Goal: Walks with improved pain control throughout the shift  Outcome: Progressing  Goal: Performs ADL's with improved pain control throughout shift  Outcome: Progressing  Goal: Participates in PT with improved pain control throughout the shift  Outcome: Progressing  Goal: Free from opioid side effects throughout the shift  Outcome: Progressing  Goal: Free from acute confusion related to pain meds throughout the shift  Outcome: Progressing

## 2024-02-14 NOTE — PROGRESS NOTES
Joellen Narayan is a 23 y.o. male on day 2 of admission presenting with Sickle cell crisis (CMS/[facility]).    2/14/24 @ 1530  [facility] Note    Plan per Medical/Surgical Team: pain control  Status: Inpatient  Payor Source: [place]  Discharge disposition: home, no needs  Expected date of discharge: 2/14/24  Barriers: none    Walking pulse ox done at bedside. No need for home going oxygen at this time as patient does not qualify. Will continue to follow patient for any discharge needs. Plan to discharge home, 2/14 with no needs.  Mari Ko RN TCC

## 2024-02-14 NOTE — DISCHARGE SUMMARY
Discharge Diagnosis  Sickle cell crisis (CMS/HCC)    Issues Requiring Follow-Up  - Sickle cell Disease  - Hodgkins lymphoma    Test Results Pending At Discharge  Pending Labs       Order Current Status    CBC and Auto Differential Collected (02/14/24 1026)    Comprehensive Metabolic Panel Collected (02/14/24 1026)    Lactate Dehydrogenase Collected (02/14/24 1026)    Reticulocytes Collected (02/14/24 1026)    Confirmation Opiate/Opioid/Benzo Prescription Compliance In process    Opiate/Opioid/Benzo Prescription Compliance In process    THC (Marijuana), Urine, Confirmation In process            Hospital Course  Joellen Narayan is a 23 year-old male PMH of newly diagnosed nodular lymphocyte predominant Hodgkins lymphoma (NLPHL) (on rituxan/prednisone), 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 CXR (2/11) w/o evidence of acute process. Hgb and lysis labs within baseline. Admitted for pain management. Started on IV dilaudid for pain management (2/12-2/14). 2/13 Hg 6.7, s/p X1 PRBC., appropriately incremented 8.4 (2/14). 2/14 walking pulse ox performed, patient does not quality for home O2 at this time.      Patient requesting discharge on DOD d/t improved pain. DC home no needs.   [facility] delivered refill for olanzapine and prednisone, oxycodone 5mg x3 days sent.    Pertinent Physical Exam At Time of Discharge  Physical Exam  Vitals reviewed.   Constitutional:       Appearance: Normal appearance.   HENT:      Head: Normocephalic and atraumatic.      Nose: Nose normal.      Mouth/Throat:      Mouth: Mucous membranes are moist.      Pharynx: Oropharynx is clear.   Eyes:      Extraocular Movements: Extraocular movements intact.      Pupils: Pupils are equal, round,  and reactive to light.   Cardiovascular:      Rate and Rhythm: Normal rate and regular rhythm.      Pulses: Normal pulses.      Heart sounds: Normal heart sounds.   Pulmonary:      Effort: Pulmonary effort is normal.      Breath sounds: Normal breath sounds.   Abdominal:      General: Bowel sounds are normal.      Palpations: Abdomen is soft.   Musculoskeletal:         General: Normal range of motion.   Skin:     General: Skin is warm.   Neurological:      General: No focal deficit present.      Mental Status: He is alert and oriented to person, place, and time. Mental status is at baseline.   Psychiatric:         Mood and Affect: Mood normal.         Behavior: Behavior normal.         Home Medications     Medication List      CONTINUE taking these medications     allopurinol 300 mg tablet; Commonly known as: Zyloprim; Take 1 tablet   (300 mg) by mouth once daily.   folic acid 1 mg tablet; Commonly known as: Folvite; Take 1 tablet (1 mg)   by mouth once daily.   OLANZapine 5 mg tablet; Commonly known as: ZyPREXA; Take 1 tablet (5 mg)   by mouth once daily at bedtime For 4 days starting the evening of   treatment.   ondansetron 8 mg tablet; Commonly known as: Zofran; Take 1 tablet (8 mg)   by mouth every 8 hours if needed for nausea or vomiting.   polyethylene glycol 17 gram packet; Commonly known as: Glycolax, Miralax   predniSONE 50 mg tablet; Commonly known as: Deltasone; Take 2 tablets   (100 mg) by mouth on Days 1, 2, 3, 4, and 5 of treatment cycle.   prochlorperazine 10 mg tablet; Commonly known as: Compazine; Take 1   tablet (10 mg) by mouth every 6 hours if needed for nausea or vomiting.   sennosides-docusate sodium 8.6-50 mg tablet; Commonly known as:   Promise-Colace   Sudogest 30 mg tablet; Generic drug: pseudoephedrine; Take 1 tablet (30   mg) by mouth as needed at bedtime (priaprism) for up to 10 days. Can take   2 tablets if needed     STOP taking these medications     enoxaparin 40 mg/0.4 mL syringe;  Commonly known as: Lovenox   voxelotor 500 mg tablet tablet; Commonly known as: OXBRYTA     ASK your doctor about these medications     Endari 5 gram powder in packet; Generic drug: glutamine (sickle cell);   Take 15 g by mouth 2 times a day.; Ask about: Which instructions should I   use?       Outpatient Follow-Up  Future Appointments   Date Time Provider Department Center   2/21/2024 10:40 AM Sade Loza PA-C [id number] Academic   2/21/2024 11:20 AM Chloe Sheth APRN-CNP [id number] Academic   2/29/2024  9:00 AM INF 17 [facility] [id number] Academic   3/20/2024 11:00 AM Renee Barrera, APRN-CNP [id number] Academic       Brenda Ocasio, APRN-CNP

## 2024-02-14 NOTE — NURSING NOTE
Walking pulse ox test: 24 1515    On room air:  At rest: 90%, HR: 78  Standin%, HR: 81  Walkin%, HR: 88    On 2L NC:   At rest: 95%, HR: 62  Standin%, HR: 73   Walkin%, HR: 96

## 2024-02-15 LAB — CARBOXYTHC UR-MCNC: 146 NG/ML

## 2024-02-16 ENCOUNTER — HOSPITAL ENCOUNTER (INPATIENT)
Facility: HOSPITAL | Age: 24
LOS: 11 days | Discharge: HOME | DRG: 811 | End: 2024-02-28
Attending: EMERGENCY MEDICINE | Admitting: INTERNAL MEDICINE
Payer: COMMERCIAL

## 2024-02-16 ENCOUNTER — APPOINTMENT (OUTPATIENT)
Dept: RADIOLOGY | Facility: HOSPITAL | Age: 24
DRG: 811 | End: 2024-02-16
Payer: COMMERCIAL

## 2024-02-16 ENCOUNTER — CLINICAL SUPPORT (OUTPATIENT)
Dept: EMERGENCY MEDICINE | Facility: HOSPITAL | Age: 24
DRG: 811 | End: 2024-02-16
Payer: COMMERCIAL

## 2024-02-16 DIAGNOSIS — D57.00 SICKLE-CELL DISEASE WITH PAIN (MULTI): ICD-10-CM

## 2024-02-16 DIAGNOSIS — C81.03 NODULAR LYMPHOCYTE PREDOMINANT HODGKIN LYMPHOMA OF INTRA-ABDOMINAL LYMPH NODES (MULTI): ICD-10-CM

## 2024-02-16 DIAGNOSIS — K80.50 CALCULUS OF BILE DUCT WITHOUT CHOLECYSTITIS AND WITHOUT OBSTRUCTION: ICD-10-CM

## 2024-02-16 DIAGNOSIS — L03.116 CELLULITIS OF LEFT LOWER EXTREMITY: ICD-10-CM

## 2024-02-16 DIAGNOSIS — G47.34 NOCTURNAL HYPOXIA: ICD-10-CM

## 2024-02-16 DIAGNOSIS — D57.00 SICKLE CELL CRISIS (MULTI): Primary | ICD-10-CM

## 2024-02-16 LAB
1OH-MIDAZOLAM UR CFM-MCNC: <25 NG/ML
6MAM UR CFM-MCNC: <25 NG/ML
7AMINOCLONAZEPAM UR CFM-MCNC: <25 NG/ML
A-OH ALPRAZ UR CFM-MCNC: <25 NG/ML
ABO GROUP (TYPE) IN BLOOD: NORMAL
ALBUMIN SERPL BCP-MCNC: 4.7 G/DL (ref 3.4–5)
ALP SERPL-CCNC: 71 U/L (ref 33–120)
ALPRAZ UR CFM-MCNC: <25 NG/ML
ALT SERPL W P-5'-P-CCNC: 14 U/L (ref 10–52)
AMPHETAMINES UR QL SCN: ABNORMAL
AMYLASE SERPL-CCNC: 15 U/L (ref 29–103)
ANION GAP SERPL CALC-SCNC: 18 MMOL/L (ref 10–20)
ANTIBODY SCREEN: NORMAL
APPEARANCE UR: CLEAR
AST SERPL W P-5'-P-CCNC: 41 U/L (ref 9–39)
BARBITURATES UR QL SCN: ABNORMAL
BASOPHILS # BLD MANUAL: 0 X10*3/UL (ref 0–0.1)
BASOPHILS NFR BLD MANUAL: 0 %
BILIRUB SERPL-MCNC: 7.3 MG/DL (ref 0–1.2)
BILIRUB UR STRIP.AUTO-MCNC: NEGATIVE MG/DL
BUN SERPL-MCNC: 8 MG/DL (ref 6–23)
BZE UR QL SCN: ABNORMAL
CA-I BLD-SCNC: 1.21 MMOL/L (ref 1.1–1.33)
CALCIUM SERPL-MCNC: 10 MG/DL (ref 8.6–10.6)
CANNABINOIDS UR QL SCN: ABNORMAL
CHLORDIAZEP UR CFM-MCNC: <25 NG/ML
CHLORIDE SERPL-SCNC: 106 MMOL/L (ref 98–107)
CLONAZEPAM UR CFM-MCNC: <25 NG/ML
CO2 SERPL-SCNC: 21 MMOL/L (ref 21–32)
CODEINE UR CFM-MCNC: <50 NG/ML
COLOR UR: ABNORMAL
CREAT SERPL-MCNC: 0.6 MG/DL (ref 0.5–1.3)
CREAT SERPL-MCNC: 0.68 MG/DL (ref 0.5–1.3)
CREAT UR-MCNC: 100.3 MG/DL (ref 20–370)
D DIMER PPP FEU-MCNC: 6363 NG/ML FEU
DACRYOCYTES BLD QL SMEAR: ABNORMAL
DIAZEPAM UR CFM-MCNC: <25 NG/ML
EDDP UR CFM-MCNC: <25 NG/ML
EGFRCR SERPLBLD CKD-EPI 2021: >90 ML/MIN/1.73M*2
EGFRCR SERPLBLD CKD-EPI 2021: >90 ML/MIN/1.73M*2
EOSINOPHIL # BLD MANUAL: 0.19 X10*3/UL (ref 0–0.7)
EOSINOPHIL NFR BLD MANUAL: 0.8 %
ERYTHROCYTE [DISTWIDTH] IN BLOOD BY AUTOMATED COUNT: 23.3 % (ref 11.5–14.5)
FENTANYL UR CFM-MCNC: <2.5 NG/ML
FLUAV RNA RESP QL NAA+PROBE: NOT DETECTED
FLUBV RNA RESP QL NAA+PROBE: NOT DETECTED
GLUCOSE SERPL-MCNC: 102 MG/DL (ref 74–99)
GLUCOSE UR STRIP.AUTO-MCNC: NORMAL MG/DL
HCT VFR BLD AUTO: 22.5 % (ref 41–52)
HGB BLD-MCNC: 8.5 G/DL (ref 13.5–17.5)
HGB RETIC QN: 29 PG (ref 28–38)
HOWELL-JOLLY BOD BLD QL SMEAR: PRESENT
HYDROCODONE CTO UR CFM-MCNC: <25 NG/ML
HYDROMORPHONE UR CFM-MCNC: >2500 NG/ML
IMM GRANULOCYTES # BLD AUTO: 0.64 X10*3/UL (ref 0–0.7)
IMM GRANULOCYTES NFR BLD AUTO: 2.7 % (ref 0–0.9)
IMMATURE RETIC FRACTION: 15.8 %
KETONES UR STRIP.AUTO-MCNC: NEGATIVE MG/DL
LACTATE SERPL-SCNC: 1.7 MMOL/L (ref 0.4–2)
LDH SERPL L TO P-CCNC: 457 U/L (ref 84–246)
LEUKOCYTE ESTERASE UR QL STRIP.AUTO: NEGATIVE
LIPASE SERPL-CCNC: 4 U/L (ref 9–82)
LORAZEPAM UR CFM-MCNC: <25 NG/ML
LYMPHOCYTES # BLD MANUAL: 2.98 X10*3/UL (ref 1.2–4.8)
LYMPHOCYTES NFR BLD MANUAL: 12.8 %
MCH RBC QN AUTO: 28.3 PG (ref 26–34)
MCHC RBC AUTO-ENTMCNC: 37.8 G/DL (ref 32–36)
MCV RBC AUTO: 75 FL (ref 80–100)
METAMYELOCYTES # BLD MANUAL: 0.21 X10*3/UL
METAMYELOCYTES NFR BLD MANUAL: 0.9 %
METHADONE UR CFM-MCNC: <25 NG/ML
MIDAZOLAM UR CFM-MCNC: <25 NG/ML
MONOCYTES # BLD MANUAL: 1.19 X10*3/UL (ref 0.1–1)
MONOCYTES NFR BLD MANUAL: 5.1 %
MORPHINE UR CFM-MCNC: <50 NG/ML
MUCOUS THREADS #/AREA URNS AUTO: NORMAL /LPF
MYELOCYTES # BLD MANUAL: 0.21 X10*3/UL
MYELOCYTES NFR BLD MANUAL: 0.9 %
NEUTS SEG # BLD MANUAL: 18.52 X10*3/UL (ref 1.2–7)
NEUTS SEG NFR BLD MANUAL: 79.5 %
NITRITE UR QL STRIP.AUTO: NEGATIVE
NORDIAZEPAM UR CFM-MCNC: <25 NG/ML
NORFENTANYL UR CFM-MCNC: <2.5 NG/ML
NORHYDROCODONE UR CFM-MCNC: <25 NG/ML
NOROXYCODONE UR CFM-MCNC: <25 NG/ML
NORTRAMADOL UR-MCNC: <50 NG/ML
NRBC BLD-RTO: 0.5 /100 WBCS (ref 0–0)
OXAZEPAM UR CFM-MCNC: <25 NG/ML
OXYCODONE UR CFM-MCNC: <25 NG/ML
OXYMORPHONE UR CFM-MCNC: <25 NG/ML
PAPPENHEIMER BOD BLD QL SMEAR: PRESENT
PCP UR QL SCN: ABNORMAL
PH UR STRIP.AUTO: 6 [PH]
PLATELET # BLD AUTO: 357 X10*3/UL (ref 150–450)
POLYCHROMASIA BLD QL SMEAR: ABNORMAL
POTASSIUM SERPL-SCNC: 3.5 MMOL/L (ref 3.5–5.3)
PROT SERPL-MCNC: 7.3 G/DL (ref 6.4–8.2)
PROT UR STRIP.AUTO-MCNC: ABNORMAL MG/DL
RBC # BLD AUTO: 3 X10*6/UL (ref 4.5–5.9)
RBC # UR STRIP.AUTO: NEGATIVE /UL
RBC #/AREA URNS AUTO: NORMAL /HPF
RBC MORPH BLD: ABNORMAL
RETICS #: 0.65 X10*6/UL (ref 0.02–0.12)
RETICS/RBC NFR AUTO: 21.1 % (ref 0.5–2)
RH FACTOR (ANTIGEN D): NORMAL
SARS-COV-2 RNA RESP QL NAA+PROBE: NOT DETECTED
SCHISTOCYTES BLD QL SMEAR: ABNORMAL
SICKLE CELLS BLD QL SMEAR: ABNORMAL
SODIUM SERPL-SCNC: 141 MMOL/L (ref 136–145)
SP GR UR STRIP.AUTO: 1.05
TARGETS BLD QL SMEAR: ABNORMAL
TEMAZEPAM UR CFM-MCNC: <25 NG/ML
TOTAL CELLS COUNTED BLD: 117
TRAMADOL UR CFM-MCNC: <50 NG/ML
UROBILINOGEN UR STRIP.AUTO-MCNC: NORMAL MG/DL
WBC # BLD AUTO: 23.3 X10*3/UL (ref 4.4–11.3)
WBC #/AREA URNS AUTO: NORMAL /HPF
ZOLPIDEM UR CFM-MCNC: <25 NG/ML
ZOLPIDEM UR-MCNC: <25 NG/ML

## 2024-02-16 PROCEDURE — 71275 CT ANGIOGRAPHY CHEST: CPT | Performed by: RADIOLOGY

## 2024-02-16 PROCEDURE — 36415 COLL VENOUS BLD VENIPUNCTURE: CPT | Performed by: EMERGENCY MEDICINE

## 2024-02-16 PROCEDURE — 81001 URINALYSIS AUTO W/SCOPE: CPT | Performed by: EMERGENCY MEDICINE

## 2024-02-16 PROCEDURE — 93005 ELECTROCARDIOGRAM TRACING: CPT

## 2024-02-16 PROCEDURE — 82150 ASSAY OF AMYLASE: CPT | Performed by: NURSE PRACTITIONER

## 2024-02-16 PROCEDURE — 83690 ASSAY OF LIPASE: CPT | Performed by: NURSE PRACTITIONER

## 2024-02-16 PROCEDURE — 80346 BENZODIAZEPINES1-12: CPT | Performed by: NURSE PRACTITIONER

## 2024-02-16 PROCEDURE — 96367 TX/PROPH/DG ADDL SEQ IV INF: CPT

## 2024-02-16 PROCEDURE — 80349 CANNABINOIDS NATURAL: CPT | Performed by: NURSE PRACTITIONER

## 2024-02-16 PROCEDURE — 85045 AUTOMATED RETICULOCYTE COUNT: CPT | Performed by: EMERGENCY MEDICINE

## 2024-02-16 PROCEDURE — 71046 X-RAY EXAM CHEST 2 VIEWS: CPT | Performed by: RADIOLOGY

## 2024-02-16 PROCEDURE — G0378 HOSPITAL OBSERVATION PER HR: HCPCS

## 2024-02-16 PROCEDURE — 71046 X-RAY EXAM CHEST 2 VIEWS: CPT

## 2024-02-16 PROCEDURE — 99285 EMERGENCY DEPT VISIT HI MDM: CPT | Performed by: EMERGENCY MEDICINE

## 2024-02-16 PROCEDURE — 74177 CT ABD & PELVIS W/CONTRAST: CPT

## 2024-02-16 PROCEDURE — 99285 EMERGENCY DEPT VISIT HI MDM: CPT | Mod: 25 | Performed by: EMERGENCY MEDICINE

## 2024-02-16 PROCEDURE — 87632 RESP VIRUS 6-11 TARGETS: CPT | Performed by: NURSE PRACTITIONER

## 2024-02-16 PROCEDURE — 82330 ASSAY OF CALCIUM: CPT | Performed by: PHYSICIAN ASSISTANT

## 2024-02-16 PROCEDURE — 2500000001 HC RX 250 WO HCPCS SELF ADMINISTERED DRUGS (ALT 637 FOR MEDICARE OP): Performed by: EMERGENCY MEDICINE

## 2024-02-16 PROCEDURE — 85027 COMPLETE CBC AUTOMATED: CPT | Performed by: EMERGENCY MEDICINE

## 2024-02-16 PROCEDURE — 2500000001 HC RX 250 WO HCPCS SELF ADMINISTERED DRUGS (ALT 637 FOR MEDICARE OP)

## 2024-02-16 PROCEDURE — 96366 THER/PROPH/DIAG IV INF ADDON: CPT

## 2024-02-16 PROCEDURE — 87636 SARSCOV2 & INF A&B AMP PRB: CPT | Performed by: EMERGENCY MEDICINE

## 2024-02-16 PROCEDURE — 85379 FIBRIN DEGRADATION QUANT: CPT | Performed by: EMERGENCY MEDICINE

## 2024-02-16 PROCEDURE — 96375 TX/PRO/DX INJ NEW DRUG ADDON: CPT

## 2024-02-16 PROCEDURE — 2500000004 HC RX 250 GENERAL PHARMACY W/ HCPCS (ALT 636 FOR OP/ED): Performed by: EMERGENCY MEDICINE

## 2024-02-16 PROCEDURE — 85007 BL SMEAR W/DIFF WBC COUNT: CPT | Performed by: EMERGENCY MEDICINE

## 2024-02-16 PROCEDURE — 83020 HEMOGLOBIN ELECTROPHORESIS: CPT | Performed by: NURSE PRACTITIONER

## 2024-02-16 PROCEDURE — 83021 HEMOGLOBIN CHROMOTOGRAPHY: CPT | Performed by: NURSE PRACTITIONER

## 2024-02-16 PROCEDURE — 74177 CT ABD & PELVIS W/CONTRAST: CPT | Performed by: RADIOLOGY

## 2024-02-16 PROCEDURE — 80307 DRUG TEST PRSMV CHEM ANLYZR: CPT | Performed by: NURSE PRACTITIONER

## 2024-02-16 PROCEDURE — 2500000004 HC RX 250 GENERAL PHARMACY W/ HCPCS (ALT 636 FOR OP/ED): Performed by: NURSE PRACTITIONER

## 2024-02-16 PROCEDURE — 96376 TX/PRO/DX INJ SAME DRUG ADON: CPT

## 2024-02-16 PROCEDURE — 83605 ASSAY OF LACTIC ACID: CPT | Performed by: EMERGENCY MEDICINE

## 2024-02-16 PROCEDURE — 86901 BLOOD TYPING SEROLOGIC RH(D): CPT | Performed by: NURSE PRACTITIONER

## 2024-02-16 PROCEDURE — 80053 COMPREHEN METABOLIC PANEL: CPT | Performed by: EMERGENCY MEDICINE

## 2024-02-16 PROCEDURE — 2550000001 HC RX 255 CONTRASTS: Performed by: INTERNAL MEDICINE

## 2024-02-16 PROCEDURE — 86920 COMPATIBILITY TEST SPIN: CPT

## 2024-02-16 PROCEDURE — 93010 ELECTROCARDIOGRAM REPORT: CPT | Performed by: EMERGENCY MEDICINE

## 2024-02-16 PROCEDURE — 2500000001 HC RX 250 WO HCPCS SELF ADMINISTERED DRUGS (ALT 637 FOR MEDICARE OP): Performed by: NURSE PRACTITIONER

## 2024-02-16 PROCEDURE — 99223 1ST HOSP IP/OBS HIGH 75: CPT | Performed by: NURSE PRACTITIONER

## 2024-02-16 PROCEDURE — 96361 HYDRATE IV INFUSION ADD-ON: CPT

## 2024-02-16 PROCEDURE — 83615 LACTATE (LD) (LDH) ENZYME: CPT | Performed by: EMERGENCY MEDICINE

## 2024-02-16 PROCEDURE — 86901 BLOOD TYPING SEROLOGIC RH(D): CPT | Performed by: PHYSICIAN ASSISTANT

## 2024-02-16 PROCEDURE — 87040 BLOOD CULTURE FOR BACTERIA: CPT | Performed by: EMERGENCY MEDICINE

## 2024-02-16 PROCEDURE — 71275 CT ANGIOGRAPHY CHEST: CPT

## 2024-02-16 PROCEDURE — 82565 ASSAY OF CREATININE: CPT | Performed by: EMERGENCY MEDICINE

## 2024-02-16 PROCEDURE — 96365 THER/PROPH/DIAG IV INF INIT: CPT

## 2024-02-16 PROCEDURE — 2500000004 HC RX 250 GENERAL PHARMACY W/ HCPCS (ALT 636 FOR OP/ED)

## 2024-02-16 RX ORDER — GABAPENTIN 300 MG/1
300 CAPSULE ORAL EVERY 8 HOURS SCHEDULED
Status: DISCONTINUED | OUTPATIENT
Start: 2024-02-16 | End: 2024-02-28 | Stop reason: HOSPADM

## 2024-02-16 RX ORDER — HYDROMORPHONE HYDROCHLORIDE 1 MG/ML
1 INJECTION, SOLUTION INTRAMUSCULAR; INTRAVENOUS; SUBCUTANEOUS
Status: COMPLETED | OUTPATIENT
Start: 2024-02-16 | End: 2024-02-16

## 2024-02-16 RX ORDER — ACETAMINOPHEN 325 MG/1
650 TABLET ORAL EVERY 6 HOURS PRN
Status: DISCONTINUED | OUTPATIENT
Start: 2024-02-16 | End: 2024-02-28 | Stop reason: HOSPADM

## 2024-02-16 RX ORDER — HYDROMORPHONE HYDROCHLORIDE 1 MG/ML
1 INJECTION, SOLUTION INTRAMUSCULAR; INTRAVENOUS; SUBCUTANEOUS
Status: DISCONTINUED | OUTPATIENT
Start: 2024-02-16 | End: 2024-02-16

## 2024-02-16 RX ORDER — KETOROLAC TROMETHAMINE 15 MG/ML
15 INJECTION, SOLUTION INTRAMUSCULAR; INTRAVENOUS ONCE
Status: DISCONTINUED | OUTPATIENT
Start: 2024-02-16 | End: 2024-02-16

## 2024-02-16 RX ORDER — DIPHENHYDRAMINE HCL 25 MG
25 CAPSULE ORAL ONCE
Status: COMPLETED | OUTPATIENT
Start: 2024-02-16 | End: 2024-02-16

## 2024-02-16 RX ORDER — AMOXICILLIN 250 MG
2 CAPSULE ORAL 2 TIMES DAILY
Status: DISCONTINUED | OUTPATIENT
Start: 2024-02-16 | End: 2024-02-28 | Stop reason: HOSPADM

## 2024-02-16 RX ORDER — FOLIC ACID 1 MG/1
1 TABLET ORAL DAILY
Status: DISCONTINUED | OUTPATIENT
Start: 2024-02-16 | End: 2024-02-28 | Stop reason: HOSPADM

## 2024-02-16 RX ORDER — HYDROMORPHONE HYDROCHLORIDE 1 MG/ML
1 INJECTION, SOLUTION INTRAMUSCULAR; INTRAVENOUS; SUBCUTANEOUS ONCE
Status: COMPLETED | OUTPATIENT
Start: 2024-02-16 | End: 2024-02-16

## 2024-02-16 RX ORDER — ONDANSETRON HYDROCHLORIDE 8 MG/1
8 TABLET, FILM COATED ORAL EVERY 8 HOURS PRN
Status: DISCONTINUED | OUTPATIENT
Start: 2024-02-16 | End: 2024-02-28 | Stop reason: HOSPADM

## 2024-02-16 RX ORDER — METRONIDAZOLE 500 MG/100ML
500 INJECTION, SOLUTION INTRAVENOUS ONCE
Status: COMPLETED | OUTPATIENT
Start: 2024-02-16 | End: 2024-02-16

## 2024-02-16 RX ORDER — ACYCLOVIR 400 MG/1
400 TABLET ORAL EVERY 12 HOURS SCHEDULED
Status: DISCONTINUED | OUTPATIENT
Start: 2024-02-16 | End: 2024-02-28 | Stop reason: HOSPADM

## 2024-02-16 RX ORDER — POLYETHYLENE GLYCOL 3350 17 G/17G
17 POWDER, FOR SOLUTION ORAL DAILY
Status: DISCONTINUED | OUTPATIENT
Start: 2024-02-16 | End: 2024-02-17

## 2024-02-16 RX ORDER — CEFEPIME 1 G/50ML
2 INJECTION, SOLUTION INTRAVENOUS ONCE
Status: COMPLETED | OUTPATIENT
Start: 2024-02-16 | End: 2024-02-16

## 2024-02-16 RX ORDER — KETOROLAC TROMETHAMINE 30 MG/ML
30 INJECTION, SOLUTION INTRAMUSCULAR; INTRAVENOUS EVERY 6 HOURS SCHEDULED
Status: COMPLETED | OUTPATIENT
Start: 2024-02-16 | End: 2024-02-19

## 2024-02-16 RX ORDER — ENOXAPARIN SODIUM 100 MG/ML
40 INJECTION SUBCUTANEOUS EVERY 24 HOURS
Status: DISCONTINUED | OUTPATIENT
Start: 2024-02-16 | End: 2024-02-18

## 2024-02-16 RX ORDER — PANTOPRAZOLE SODIUM 40 MG/1
40 TABLET, DELAYED RELEASE ORAL
Status: DISCONTINUED | OUTPATIENT
Start: 2024-02-17 | End: 2024-02-28 | Stop reason: HOSPADM

## 2024-02-16 RX ORDER — KETOROLAC TROMETHAMINE 15 MG/ML
15 INJECTION, SOLUTION INTRAMUSCULAR; INTRAVENOUS ONCE
Status: COMPLETED | OUTPATIENT
Start: 2024-02-16 | End: 2024-02-16

## 2024-02-16 RX ORDER — SODIUM CHLORIDE 9 MG/ML
125 INJECTION, SOLUTION INTRAVENOUS CONTINUOUS
Status: DISCONTINUED | OUTPATIENT
Start: 2024-02-16 | End: 2024-02-17

## 2024-02-16 RX ORDER — DIPHENHYDRAMINE HCL 25 MG
25 CAPSULE ORAL EVERY 6 HOURS PRN
Status: DISCONTINUED | OUTPATIENT
Start: 2024-02-16 | End: 2024-02-28 | Stop reason: HOSPADM

## 2024-02-16 RX ORDER — ALLOPURINOL 300 MG/1
300 TABLET ORAL DAILY
Status: DISCONTINUED | OUTPATIENT
Start: 2024-02-16 | End: 2024-02-28 | Stop reason: HOSPADM

## 2024-02-16 RX ORDER — VANCOMYCIN HYDROCHLORIDE 1 G/200ML
2 INJECTION, SOLUTION INTRAVENOUS ONCE
Status: COMPLETED | OUTPATIENT
Start: 2024-02-16 | End: 2024-02-16

## 2024-02-16 RX ADMIN — HYDROMORPHONE HYDROCHLORIDE 1 MG: 1 INJECTION, SOLUTION INTRAMUSCULAR; INTRAVENOUS; SUBCUTANEOUS at 13:58

## 2024-02-16 RX ADMIN — IOHEXOL 80 ML: 350 INJECTION, SOLUTION INTRAVENOUS at 18:14

## 2024-02-16 RX ADMIN — SODIUM CHLORIDE, POTASSIUM CHLORIDE, SODIUM LACTATE AND CALCIUM CHLORIDE 1000 ML: 600; 310; 30; 20 INJECTION, SOLUTION INTRAVENOUS at 12:07

## 2024-02-16 RX ADMIN — SENNOSIDES AND DOCUSATE SODIUM 2 TABLET: 8.6; 5 TABLET ORAL at 16:39

## 2024-02-16 RX ADMIN — HYDROMORPHONE HYDROCHLORIDE 4 MG: 2 INJECTION, SOLUTION INTRAMUSCULAR; INTRAVENOUS; SUBCUTANEOUS at 20:05

## 2024-02-16 RX ADMIN — KETOROLAC TROMETHAMINE 30 MG: 30 INJECTION, SOLUTION INTRAMUSCULAR; INTRAVENOUS at 17:19

## 2024-02-16 RX ADMIN — ACETAMINOPHEN 650 MG: 325 TABLET ORAL at 22:22

## 2024-02-16 RX ADMIN — KETOROLAC TROMETHAMINE 15 MG: 15 INJECTION, SOLUTION INTRAMUSCULAR; INTRAVENOUS at 12:25

## 2024-02-16 RX ADMIN — KETOROLAC TROMETHAMINE 30 MG: 30 INJECTION, SOLUTION INTRAMUSCULAR; INTRAVENOUS at 23:47

## 2024-02-16 RX ADMIN — VANCOMYCIN HYDROCHLORIDE 2 G: 1 INJECTION, SOLUTION INTRAVENOUS at 16:16

## 2024-02-16 RX ADMIN — HYDROMORPHONE HYDROCHLORIDE 4 MG: 2 INJECTION, SOLUTION INTRAMUSCULAR; INTRAVENOUS; SUBCUTANEOUS at 22:09

## 2024-02-16 RX ADMIN — HYDROMORPHONE HYDROCHLORIDE 1 MG: 1 INJECTION, SOLUTION INTRAMUSCULAR; INTRAVENOUS; SUBCUTANEOUS at 15:18

## 2024-02-16 RX ADMIN — FOLIC ACID 1 MG: 1 TABLET ORAL at 16:39

## 2024-02-16 RX ADMIN — ACYCLOVIR 400 MG: 400 TABLET ORAL at 20:08

## 2024-02-16 RX ADMIN — ENOXAPARIN SODIUM 40 MG: 100 INJECTION SUBCUTANEOUS at 20:07

## 2024-02-16 RX ADMIN — GABAPENTIN 300 MG: 300 CAPSULE ORAL at 22:22

## 2024-02-16 RX ADMIN — METRONIDAZOLE 500 MG: 5 INJECTION, SOLUTION INTRAVENOUS at 20:06

## 2024-02-16 RX ADMIN — DIPHENHYDRAMINE HYDROCHLORIDE 25 MG: 25 CAPSULE ORAL at 12:03

## 2024-02-16 RX ADMIN — HYDROMORPHONE HYDROCHLORIDE 1 MG: 1 INJECTION, SOLUTION INTRAMUSCULAR; INTRAVENOUS; SUBCUTANEOUS at 12:01

## 2024-02-16 RX ADMIN — HYDROMORPHONE HYDROCHLORIDE 4 MG: 2 INJECTION, SOLUTION INTRAMUSCULAR; INTRAVENOUS; SUBCUTANEOUS at 15:48

## 2024-02-16 RX ADMIN — SENNOSIDES AND DOCUSATE SODIUM 2 TABLET: 8.6; 5 TABLET ORAL at 20:06

## 2024-02-16 RX ADMIN — ALLOPURINOL 300 MG: 300 TABLET ORAL at 14:40

## 2024-02-16 RX ADMIN — POLYETHYLENE GLYCOL 3350 17 G: 17 POWDER, FOR SOLUTION ORAL at 16:39

## 2024-02-16 RX ADMIN — CEFEPIME 2 G: 1 INJECTION, SOLUTION INTRAVENOUS at 14:34

## 2024-02-16 RX ADMIN — SODIUM CHLORIDE 125 ML/HR: 9 INJECTION, SOLUTION INTRAVENOUS at 22:22

## 2024-02-16 RX ADMIN — HYDROMORPHONE HYDROCHLORIDE 4 MG: 2 INJECTION, SOLUTION INTRAMUSCULAR; INTRAVENOUS; SUBCUTANEOUS at 17:58

## 2024-02-16 RX ADMIN — HYDROMORPHONE HYDROCHLORIDE 1 MG: 1 INJECTION, SOLUTION INTRAMUSCULAR; INTRAVENOUS; SUBCUTANEOUS at 12:54

## 2024-02-16 SDOH — SOCIAL STABILITY: SOCIAL INSECURITY: DO YOU FEEL ANYONE HAS EXPLOITED OR TAKEN ADVANTAGE OF YOU FINANCIALLY OR OF YOUR PERSONAL PROPERTY?: NO

## 2024-02-16 SDOH — SOCIAL STABILITY: SOCIAL INSECURITY: HAVE YOU HAD THOUGHTS OF HARMING ANYONE ELSE?: NO

## 2024-02-16 SDOH — SOCIAL STABILITY: SOCIAL INSECURITY: ARE THERE ANY APPARENT SIGNS OF INJURIES/BEHAVIORS THAT COULD BE RELATED TO ABUSE/NEGLECT?: NO

## 2024-02-16 SDOH — SOCIAL STABILITY: SOCIAL INSECURITY: HAS ANYONE EVER THREATENED TO HURT YOUR FAMILY OR YOUR PETS?: NO

## 2024-02-16 SDOH — SOCIAL STABILITY: SOCIAL INSECURITY: DOES ANYONE TRY TO KEEP YOU FROM HAVING/CONTACTING OTHER FRIENDS OR DOING THINGS OUTSIDE YOUR HOME?: NO

## 2024-02-16 SDOH — SOCIAL STABILITY: SOCIAL INSECURITY: DO YOU FEEL UNSAFE GOING BACK TO THE PLACE WHERE YOU ARE LIVING?: NO

## 2024-02-16 SDOH — SOCIAL STABILITY: SOCIAL INSECURITY: WERE YOU ABLE TO COMPLETE ALL THE BEHAVIORAL HEALTH SCREENINGS?: YES

## 2024-02-16 SDOH — SOCIAL STABILITY: SOCIAL INSECURITY: ARE YOU OR HAVE YOU BEEN THREATENED OR ABUSED PHYSICALLY, EMOTIONALLY, OR SEXUALLY BY ANYONE?: NO

## 2024-02-16 SDOH — SOCIAL STABILITY: SOCIAL INSECURITY: ABUSE: ADULT

## 2024-02-16 ASSESSMENT — PAIN - FUNCTIONAL ASSESSMENT
PAIN_FUNCTIONAL_ASSESSMENT: 0-10

## 2024-02-16 ASSESSMENT — PAIN DESCRIPTION - LOCATION
LOCATION: GENERALIZED
LOCATION: BACK
LOCATION: OTHER (COMMENT)

## 2024-02-16 ASSESSMENT — COLUMBIA-SUICIDE SEVERITY RATING SCALE - C-SSRS
6. HAVE YOU EVER DONE ANYTHING, STARTED TO DO ANYTHING, OR PREPARED TO DO ANYTHING TO END YOUR LIFE?: NO
6. HAVE YOU EVER DONE ANYTHING, STARTED TO DO ANYTHING, OR PREPARED TO DO ANYTHING TO END YOUR LIFE?: NO
1. IN THE PAST MONTH, HAVE YOU WISHED YOU WERE DEAD OR WISHED YOU COULD GO TO SLEEP AND NOT WAKE UP?: NO
2. HAVE YOU ACTUALLY HAD ANY THOUGHTS OF KILLING YOURSELF?: NO
2. HAVE YOU ACTUALLY HAD ANY THOUGHTS OF KILLING YOURSELF?: NO
1. IN THE PAST MONTH, HAVE YOU WISHED YOU WERE DEAD OR WISHED YOU COULD GO TO SLEEP AND NOT WAKE UP?: NO

## 2024-02-16 ASSESSMENT — LIFESTYLE VARIABLES
HAVE YOU EVER FELT YOU SHOULD CUT DOWN ON YOUR DRINKING: NO
SKIP TO QUESTIONS 9-10: 1
AUDIT-C TOTAL SCORE: 0
EVER HAD A DRINK FIRST THING IN THE MORNING TO STEADY YOUR NERVES TO GET RID OF A HANGOVER: NO
HOW MANY STANDARD DRINKS CONTAINING ALCOHOL DO YOU HAVE ON A TYPICAL DAY: PATIENT DOES NOT DRINK
HOW OFTEN DO YOU HAVE A DRINK CONTAINING ALCOHOL: NEVER
EVER FELT BAD OR GUILTY ABOUT YOUR DRINKING: NO
AUDIT-C TOTAL SCORE: 0
HOW OFTEN DO YOU HAVE 6 OR MORE DRINKS ON ONE OCCASION: NEVER
HAVE PEOPLE ANNOYED YOU BY CRITICIZING YOUR DRINKING: NO

## 2024-02-16 ASSESSMENT — ACTIVITIES OF DAILY LIVING (ADL)
LACK_OF_TRANSPORTATION: NO
BATHING: INDEPENDENT
DRESSING YOURSELF: INDEPENDENT
JUDGMENT_ADEQUATE_SAFELY_COMPLETE_DAILY_ACTIVITIES: YES
PATIENT'S MEMORY ADEQUATE TO SAFELY COMPLETE DAILY ACTIVITIES?: YES
WALKS IN HOME: INDEPENDENT
GROOMING: INDEPENDENT
TOILETING: INDEPENDENT
HEARING - RIGHT EAR: FUNCTIONAL
FEEDING YOURSELF: INDEPENDENT
HEARING - LEFT EAR: FUNCTIONAL
ADEQUATE_TO_COMPLETE_ADL: YES

## 2024-02-16 ASSESSMENT — PAIN SCALES - GENERAL
PAINLEVEL_OUTOF10: 10 - WORST POSSIBLE PAIN

## 2024-02-16 ASSESSMENT — COGNITIVE AND FUNCTIONAL STATUS - GENERAL
PATIENT BASELINE BEDBOUND: NO
MOBILITY SCORE: 24
DAILY ACTIVITIY SCORE: 24
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
## 2024-02-16 NOTE — Clinical Note
Apheresis catheter placed in R Internal jugular. 2 mg of Versed and 100 mcg of Fentanyl given throughout procedure. VSS. All ports flushed, aspirated, heparinized, and capped. Lucretia C/D/I. Report given to floor RN. Pt in transport to room.

## 2024-02-16 NOTE — ED TRIAGE NOTES
Pt to ED via EMS due to extreme sickle cell pain. Pt rated pain 10/10. Pt stated his oxycodone home dose was not working.

## 2024-02-16 NOTE — PROGRESS NOTES
Spiritual Care Visit    Clinical Encounter Type  Visited With: Patient  Routine Visit: Follow-up  Continue Visiting: Yes    Taxonomy  Intended Effects: Convey a calming presence, Demonstrate caring and concern, Helping someone feel comforted  Methods: Offer emotional support, Offer spiritual/Episcopal support  Interventions: Acknowledge current situation, Active listening, [place], Provide hospitality     visited patient Joellen Narayan. Patient known to this  from previous visits. Patient was experiencing significant pain and requested prayer, which  provided.  remained a compassionate and supportive presence. Patient was appreciative of care and did not have any further needs at this time. Spiritual Care will provide ongoing support and remains available as needed/requested.    Rev. Diana Álvarez MDiv, BCC

## 2024-02-16 NOTE — CARE PLAN
The patient's goals for the shift include      The clinical goals for the shift include pain well managed through shift      Problem: Pain  Goal: My pain/discomfort is manageable  Outcome: Progressing     Problem: Safety  Goal: Patient will be injury free during hospitalization  Outcome: Progressing  Goal: I will remain free of falls  Outcome: Progressing     Problem: Daily Care  Goal: Daily care needs are met  Outcome: Progressing     Problem: Psychosocial Needs  Goal: Demonstrates ability to cope with hospitalization/illness  Outcome: Progressing  Goal: Collaborate with me, my family, and caregiver to identify my specific goals  Outcome: Progressing     Problem: Discharge Barriers  Goal: My discharge needs are met  Outcome: Progressing

## 2024-02-17 ENCOUNTER — APPOINTMENT (OUTPATIENT)
Dept: OTHER | Facility: HOSPITAL | Age: 24
DRG: 811 | End: 2024-02-17
Payer: COMMERCIAL

## 2024-02-17 ENCOUNTER — APPOINTMENT (OUTPATIENT)
Dept: RADIOLOGY | Facility: HOSPITAL | Age: 24
DRG: 811 | End: 2024-02-17
Payer: COMMERCIAL

## 2024-02-17 LAB
ABO GROUP (TYPE) IN BLOOD: NORMAL
ALBUMIN SERPL BCP-MCNC: 4 G/DL (ref 3.4–5)
ALP SERPL-CCNC: 79 U/L (ref 33–120)
ALT SERPL W P-5'-P-CCNC: 15 U/L (ref 10–52)
ANION GAP SERPL CALC-SCNC: 14 MMOL/L (ref 10–20)
ANTIBODY SCREEN: NORMAL
APTT PPP: 29 SECONDS (ref 27–38)
AST SERPL W P-5'-P-CCNC: 52 U/L (ref 9–39)
ATRIAL RATE: 468 BPM
BASOPHILS # BLD AUTO: 0.05 X10*3/UL (ref 0–0.1)
BASOPHILS NFR BLD AUTO: 0.3 %
BILIRUB SERPL-MCNC: 7.7 MG/DL (ref 0–1.2)
BUN SERPL-MCNC: 10 MG/DL (ref 6–23)
CALCIUM SERPL-MCNC: 9.5 MG/DL (ref 8.6–10.6)
CHLORIDE SERPL-SCNC: 106 MMOL/L (ref 98–107)
CO2 SERPL-SCNC: 21 MMOL/L (ref 21–32)
CREAT SERPL-MCNC: 0.65 MG/DL (ref 0.5–1.3)
EGFRCR SERPLBLD CKD-EPI 2021: >90 ML/MIN/1.73M*2
EOSINOPHIL # BLD AUTO: 0.08 X10*3/UL (ref 0–0.7)
EOSINOPHIL NFR BLD AUTO: 0.5 %
ERYTHROCYTE [DISTWIDTH] IN BLOOD BY AUTOMATED COUNT: 23.8 % (ref 11.5–14.5)
GLUCOSE SERPL-MCNC: 78 MG/DL (ref 74–99)
HCT VFR BLD AUTO: 21.5 % (ref 41–52)
HGB BLD-MCNC: 7.7 G/DL (ref 13.5–17.5)
HGB RETIC QN: 28 PG (ref 28–38)
IMM GRANULOCYTES # BLD AUTO: 0.18 X10*3/UL (ref 0–0.7)
IMM GRANULOCYTES NFR BLD AUTO: 1.2 % (ref 0–0.9)
IMMATURE RETIC FRACTION: 14.7 %
INR PPP: 1.5 (ref 0.9–1.1)
LDH SERPL L TO P-CCNC: 662 U/L (ref 84–246)
LYMPHOCYTES # BLD AUTO: 1.76 X10*3/UL (ref 1.2–4.8)
LYMPHOCYTES NFR BLD AUTO: 12.1 %
MCH RBC QN AUTO: 27.4 PG (ref 26–34)
MCHC RBC AUTO-ENTMCNC: 35.8 G/DL (ref 32–36)
MCV RBC AUTO: 77 FL (ref 80–100)
MONOCYTES # BLD AUTO: 1.79 X10*3/UL (ref 0.1–1)
MONOCYTES NFR BLD AUTO: 12.3 %
MRSA DNA SPEC QL NAA+PROBE: NOT DETECTED
NEUTROPHILS # BLD AUTO: 10.73 X10*3/UL (ref 1.2–7.7)
NEUTROPHILS NFR BLD AUTO: 73.6 %
NRBC BLD-RTO: 6.6 /100 WBCS (ref 0–0)
P AXIS: 38 DEGREES
P OFFSET: 162 MS
P ONSET: 129 MS
PLATELET # BLD AUTO: 340 X10*3/UL (ref 150–450)
POTASSIUM SERPL-SCNC: 3.8 MMOL/L (ref 3.5–5.3)
PROT SERPL-MCNC: 6.3 G/DL (ref 6.4–8.2)
PROTHROMBIN TIME: 17.2 SECONDS (ref 9.8–12.8)
Q ONSET: 215 MS
QRS COUNT: 13 BEATS
QRS DURATION: 100 MS
QT INTERVAL: 394 MS
QTC CALCULATION(BAZETT): 460 MS
QTC FREDERICIA: 437 MS
R AXIS: 65 DEGREES
RBC # BLD AUTO: 2.81 X10*6/UL (ref 4.5–5.9)
RETICS #: 0.61 X10*6/UL (ref 0.02–0.12)
RETICS/RBC NFR AUTO: 21.8 % (ref 0.5–2)
RH FACTOR (ANTIGEN D): NORMAL
SODIUM SERPL-SCNC: 137 MMOL/L (ref 136–145)
T AXIS: 266 DEGREES
T OFFSET: 412 MS
VENTRICULAR RATE: 82 BPM
WBC # BLD AUTO: 14.6 X10*3/UL (ref 4.4–11.3)

## 2024-02-17 PROCEDURE — P9040 RBC LEUKOREDUCED IRRADIATED: HCPCS

## 2024-02-17 PROCEDURE — 2500000001 HC RX 250 WO HCPCS SELF ADMINISTERED DRUGS (ALT 637 FOR MEDICARE OP)

## 2024-02-17 PROCEDURE — 99153 MOD SED SAME PHYS/QHP EA: CPT | Performed by: RADIOLOGY

## 2024-02-17 PROCEDURE — 85660 RBC SICKLE CELL TEST: CPT

## 2024-02-17 PROCEDURE — 2500000004 HC RX 250 GENERAL PHARMACY W/ HCPCS (ALT 636 FOR OP/ED): Performed by: INTERNAL MEDICINE

## 2024-02-17 PROCEDURE — A4217 STERILE WATER/SALINE, 500 ML: HCPCS | Performed by: INTERNAL MEDICINE

## 2024-02-17 PROCEDURE — 2500000002 HC RX 250 W HCPCS SELF ADMINISTERED DRUGS (ALT 637 FOR MEDICARE OP, ALT 636 FOR OP/ED): Performed by: NURSE PRACTITIONER

## 2024-02-17 PROCEDURE — 2500000004 HC RX 250 GENERAL PHARMACY W/ HCPCS (ALT 636 FOR OP/ED): Performed by: RADIOLOGY

## 2024-02-17 PROCEDURE — 76942 ECHO GUIDE FOR BIOPSY: CPT | Performed by: RADIOLOGY

## 2024-02-17 PROCEDURE — 99152 MOD SED SAME PHYS/QHP 5/>YRS: CPT | Performed by: RADIOLOGY

## 2024-02-17 PROCEDURE — 86902 BLOOD TYPE ANTIGEN DONOR EA: CPT

## 2024-02-17 PROCEDURE — 80053 COMPREHEN METABOLIC PANEL: CPT | Performed by: NURSE PRACTITIONER

## 2024-02-17 PROCEDURE — 2500000004 HC RX 250 GENERAL PHARMACY W/ HCPCS (ALT 636 FOR OP/ED)

## 2024-02-17 PROCEDURE — 77001 FLUOROGUIDE FOR VEIN DEVICE: CPT | Performed by: RADIOLOGY

## 2024-02-17 PROCEDURE — 02HV33Z INSERTION OF INFUSION DEVICE INTO SUPERIOR VENA CAVA, PERCUTANEOUS APPROACH: ICD-10-PCS | Performed by: PATHOLOGY

## 2024-02-17 PROCEDURE — 99233 SBSQ HOSP IP/OBS HIGH 50: CPT | Performed by: INTERNAL MEDICINE

## 2024-02-17 PROCEDURE — 36415 COLL VENOUS BLD VENIPUNCTURE: CPT | Performed by: NURSE PRACTITIONER

## 2024-02-17 PROCEDURE — C1894 INTRO/SHEATH, NON-LASER: HCPCS

## 2024-02-17 PROCEDURE — 36556 INSERT NON-TUNNEL CV CATH: CPT | Performed by: RADIOLOGY

## 2024-02-17 PROCEDURE — 2500000004 HC RX 250 GENERAL PHARMACY W/ HCPCS (ALT 636 FOR OP/ED): Performed by: NURSE PRACTITIONER

## 2024-02-17 PROCEDURE — 76937 US GUIDE VASCULAR ACCESS: CPT | Performed by: RADIOLOGY

## 2024-02-17 PROCEDURE — 36512 APHERESIS RBC: CPT | Performed by: PATHOLOGY

## 2024-02-17 PROCEDURE — 85610 PROTHROMBIN TIME: CPT

## 2024-02-17 PROCEDURE — 2500000005 HC RX 250 GENERAL PHARMACY W/O HCPCS

## 2024-02-17 PROCEDURE — C1769 GUIDE WIRE: HCPCS

## 2024-02-17 PROCEDURE — 2500000001 HC RX 250 WO HCPCS SELF ADMINISTERED DRUGS (ALT 637 FOR MEDICARE OP): Performed by: NURSE PRACTITIONER

## 2024-02-17 PROCEDURE — 87641 MR-STAPH DNA AMP PROBE: CPT | Performed by: INTERNAL MEDICINE

## 2024-02-17 PROCEDURE — 85025 COMPLETE CBC W/AUTO DIFF WBC: CPT | Performed by: NURSE PRACTITIONER

## 2024-02-17 PROCEDURE — C1752 CATH,HEMODIALYSIS,SHORT-TERM: HCPCS

## 2024-02-17 PROCEDURE — 1170000001 HC PRIVATE ONCOLOGY ROOM DAILY

## 2024-02-17 PROCEDURE — 2720000007 HC OR 272 NO HCPCS

## 2024-02-17 PROCEDURE — 36415 COLL VENOUS BLD VENIPUNCTURE: CPT

## 2024-02-17 PROCEDURE — 83615 LACTATE (LD) (LDH) ENZYME: CPT | Performed by: NURSE PRACTITIONER

## 2024-02-17 PROCEDURE — 99221 1ST HOSP IP/OBS SF/LOW 40: CPT

## 2024-02-17 PROCEDURE — 36430 TRANSFUSION BLD/BLD COMPNT: CPT

## 2024-02-17 PROCEDURE — 85045 AUTOMATED RETICULOCYTE COUNT: CPT | Performed by: NURSE PRACTITIONER

## 2024-02-17 RX ORDER — DIPHENHYDRAMINE HCL 50 MG
50 CAPSULE ORAL ONCE
Status: COMPLETED | OUTPATIENT
Start: 2024-02-17 | End: 2024-02-17

## 2024-02-17 RX ORDER — MIDAZOLAM HYDROCHLORIDE 1 MG/ML
INJECTION INTRAMUSCULAR; INTRAVENOUS
Status: COMPLETED | OUTPATIENT
Start: 2024-02-17 | End: 2024-02-17

## 2024-02-17 RX ORDER — ALBUTEROL SULFATE 0.83 MG/ML
2.5 SOLUTION RESPIRATORY (INHALATION) EVERY 6 HOURS PRN
Status: DISCONTINUED | OUTPATIENT
Start: 2024-02-17 | End: 2024-02-28 | Stop reason: HOSPADM

## 2024-02-17 RX ORDER — DICLOFENAC SODIUM 10 MG/G
4 GEL TOPICAL 4 TIMES DAILY PRN
Status: DISCONTINUED | OUTPATIENT
Start: 2024-02-17 | End: 2024-02-18

## 2024-02-17 RX ORDER — HEPARIN SODIUM 1000 [USP'U]/ML
1000 INJECTION, SOLUTION INTRAVENOUS; SUBCUTANEOUS ONCE
Status: COMPLETED | OUTPATIENT
Start: 2024-02-17 | End: 2024-02-18

## 2024-02-17 RX ORDER — DIPHENHYDRAMINE HYDROCHLORIDE 50 MG/ML
25 INJECTION INTRAMUSCULAR; INTRAVENOUS EVERY 5 MIN PRN
Status: DISCONTINUED | OUTPATIENT
Start: 2024-02-17 | End: 2024-02-18 | Stop reason: HOSPADM

## 2024-02-17 RX ORDER — SODIUM CHLORIDE, SODIUM LACTATE, POTASSIUM CHLORIDE, CALCIUM CHLORIDE 600; 310; 30; 20 MG/100ML; MG/100ML; MG/100ML; MG/100ML
75 INJECTION, SOLUTION INTRAVENOUS CONTINUOUS
Status: DISCONTINUED | OUTPATIENT
Start: 2024-02-17 | End: 2024-02-19

## 2024-02-17 RX ORDER — ENOXAPARIN SODIUM 100 MG/ML
40 INJECTION SUBCUTANEOUS DAILY
Status: DISCONTINUED | OUTPATIENT
Start: 2024-02-17 | End: 2024-02-28 | Stop reason: HOSPADM

## 2024-02-17 RX ORDER — FENTANYL CITRATE 50 UG/ML
INJECTION, SOLUTION INTRAMUSCULAR; INTRAVENOUS
Status: COMPLETED | OUTPATIENT
Start: 2024-02-17 | End: 2024-02-17

## 2024-02-17 RX ORDER — ACETAMINOPHEN 325 MG/1
650 TABLET ORAL ONCE
Status: COMPLETED | OUTPATIENT
Start: 2024-02-17 | End: 2024-02-17

## 2024-02-17 RX ORDER — HYDROMORPHONE HCL/0.9% NACL/PF 15 MG/30ML
PATIENT CONTROLLED ANALGESIA SYRINGE INTRAVENOUS CONTINUOUS
Status: DISCONTINUED | OUTPATIENT
Start: 2024-02-17 | End: 2024-02-19

## 2024-02-17 RX ORDER — NALOXONE HYDROCHLORIDE 0.4 MG/ML
0.2 INJECTION, SOLUTION INTRAMUSCULAR; INTRAVENOUS; SUBCUTANEOUS AS NEEDED
Status: DISCONTINUED | OUTPATIENT
Start: 2024-02-17 | End: 2024-02-28 | Stop reason: HOSPADM

## 2024-02-17 RX ORDER — ACETAMINOPHEN 325 MG/1
325 TABLET ORAL ONCE
Status: COMPLETED | OUTPATIENT
Start: 2024-02-17 | End: 2024-02-17

## 2024-02-17 RX ORDER — HYDROMORPHONE HYDROCHLORIDE 1 MG/ML
1 INJECTION, SOLUTION INTRAMUSCULAR; INTRAVENOUS; SUBCUTANEOUS ONCE
Status: COMPLETED | OUTPATIENT
Start: 2024-02-17 | End: 2024-02-17

## 2024-02-17 RX ORDER — CEFEPIME 1 G/50ML
2 INJECTION, SOLUTION INTRAVENOUS EVERY 8 HOURS
Status: DISCONTINUED | OUTPATIENT
Start: 2024-02-17 | End: 2024-02-19

## 2024-02-17 RX ORDER — CALCIUM CARBONATE 200(500)MG
1500 TABLET,CHEWABLE ORAL EVERY 5 MIN PRN
Status: DISCONTINUED | OUTPATIENT
Start: 2024-02-17 | End: 2024-02-18 | Stop reason: HOSPADM

## 2024-02-17 RX ORDER — LORAZEPAM 2 MG/ML
0.5 INJECTION INTRAMUSCULAR ONCE
Status: COMPLETED | OUTPATIENT
Start: 2024-02-17 | End: 2024-02-17

## 2024-02-17 RX ORDER — ACETAMINOPHEN 325 MG/1
325 TABLET ORAL ONCE
Status: DISCONTINUED | OUTPATIENT
Start: 2024-02-17 | End: 2024-02-17

## 2024-02-17 RX ORDER — LIDOCAINE 560 MG/1
1 PATCH PERCUTANEOUS; TOPICAL; TRANSDERMAL DAILY
Status: DISCONTINUED | OUTPATIENT
Start: 2024-02-17 | End: 2024-02-28 | Stop reason: HOSPADM

## 2024-02-17 RX ORDER — DICLOFENAC SODIUM 10 MG/G
4 GEL TOPICAL 4 TIMES DAILY
Status: DISCONTINUED | OUTPATIENT
Start: 2024-02-17 | End: 2024-02-28 | Stop reason: HOSPADM

## 2024-02-17 RX ORDER — LACTULOSE 10 G/15ML
20 SOLUTION ORAL 2 TIMES DAILY
Status: DISCONTINUED | OUTPATIENT
Start: 2024-02-17 | End: 2024-02-18

## 2024-02-17 RX ORDER — ADHESIVE BANDAGE
30 BANDAGE TOPICAL DAILY
Status: DISCONTINUED | OUTPATIENT
Start: 2024-02-17 | End: 2024-02-18

## 2024-02-17 RX ORDER — POLYETHYLENE GLYCOL 3350 17 G/17G
17 POWDER, FOR SOLUTION ORAL 2 TIMES DAILY
Status: DISCONTINUED | OUTPATIENT
Start: 2024-02-17 | End: 2024-02-18

## 2024-02-17 RX ADMIN — MIDAZOLAM HYDROCHLORIDE 1 MG: 1 INJECTION, SOLUTION INTRAMUSCULAR; INTRAVENOUS at 19:14

## 2024-02-17 RX ADMIN — POLYETHYLENE GLYCOL 3350 17 G: 17 POWDER, FOR SOLUTION ORAL at 21:34

## 2024-02-17 RX ADMIN — POLYETHYLENE GLYCOL 3350 17 G: 17 POWDER, FOR SOLUTION ORAL at 08:11

## 2024-02-17 RX ADMIN — KETOROLAC TROMETHAMINE 30 MG: 30 INJECTION, SOLUTION INTRAMUSCULAR; INTRAVENOUS at 17:32

## 2024-02-17 RX ADMIN — GABAPENTIN 300 MG: 300 CAPSULE ORAL at 14:53

## 2024-02-17 RX ADMIN — KETOROLAC TROMETHAMINE 30 MG: 30 INJECTION, SOLUTION INTRAMUSCULAR; INTRAVENOUS at 06:11

## 2024-02-17 RX ADMIN — VANCOMYCIN HYDROCHLORIDE 1250 MG: 5 INJECTION, POWDER, LYOPHILIZED, FOR SOLUTION INTRAVENOUS at 12:45

## 2024-02-17 RX ADMIN — LACTULOSE 20 G: 20 SOLUTION ORAL at 21:30

## 2024-02-17 RX ADMIN — ENOXAPARIN SODIUM 40 MG: 100 INJECTION SUBCUTANEOUS at 21:34

## 2024-02-17 RX ADMIN — HYDROMORPHONE HYDROCHLORIDE 4 MG: 2 INJECTION, SOLUTION INTRAMUSCULAR; INTRAVENOUS; SUBCUTANEOUS at 00:10

## 2024-02-17 RX ADMIN — FENTANYL CITRATE 50 MCG: 50 INJECTION, SOLUTION INTRAMUSCULAR; INTRAVENOUS at 19:23

## 2024-02-17 RX ADMIN — MIDAZOLAM HYDROCHLORIDE 1 MG: 1 INJECTION, SOLUTION INTRAMUSCULAR; INTRAVENOUS at 19:23

## 2024-02-17 RX ADMIN — DICLOFENAC SODIUM TOPICAL GEL, 1% 4 G: 10 GEL TOPICAL at 17:32

## 2024-02-17 RX ADMIN — PANTOPRAZOLE SODIUM 40 MG: 40 TABLET, DELAYED RELEASE ORAL at 06:11

## 2024-02-17 RX ADMIN — CEFEPIME 2 G: 1 INJECTION, SOLUTION INTRAVENOUS at 10:48

## 2024-02-17 RX ADMIN — ACYCLOVIR 400 MG: 400 TABLET ORAL at 21:24

## 2024-02-17 RX ADMIN — ALLOPURINOL 300 MG: 300 TABLET ORAL at 08:11

## 2024-02-17 RX ADMIN — ACYCLOVIR 400 MG: 400 TABLET ORAL at 08:11

## 2024-02-17 RX ADMIN — DIPHENHYDRAMINE HYDROCHLORIDE 50 MG: 50 CAPSULE ORAL at 22:05

## 2024-02-17 RX ADMIN — HYDROMORPHONE HYDROCHLORIDE 1 MG: 1 INJECTION, SOLUTION INTRAMUSCULAR; INTRAVENOUS; SUBCUTANEOUS at 03:12

## 2024-02-17 RX ADMIN — HYDROMORPHONE HYDROCHLORIDE 4 MG: 2 INJECTION, SOLUTION INTRAMUSCULAR; INTRAVENOUS; SUBCUTANEOUS at 02:10

## 2024-02-17 RX ADMIN — Medication: at 23:57

## 2024-02-17 RX ADMIN — HYDROMORPHONE HYDROCHLORIDE 4 MG: 2 INJECTION, SOLUTION INTRAMUSCULAR; INTRAVENOUS; SUBCUTANEOUS at 04:10

## 2024-02-17 RX ADMIN — CEFEPIME 2 G: 1 INJECTION, SOLUTION INTRAVENOUS at 20:45

## 2024-02-17 RX ADMIN — FOLIC ACID 1 MG: 1 TABLET ORAL at 08:11

## 2024-02-17 RX ADMIN — ACETAMINOPHEN 650 MG: 325 TABLET ORAL at 11:23

## 2024-02-17 RX ADMIN — SENNOSIDES AND DOCUSATE SODIUM 2 TABLET: 8.6; 5 TABLET ORAL at 21:24

## 2024-02-17 RX ADMIN — Medication: at 11:46

## 2024-02-17 RX ADMIN — SODIUM CHLORIDE, POTASSIUM CHLORIDE, SODIUM LACTATE AND CALCIUM CHLORIDE 75 ML/HR: 600; 310; 30; 20 INJECTION, SOLUTION INTRAVENOUS at 08:05

## 2024-02-17 RX ADMIN — Medication: at 06:41

## 2024-02-17 RX ADMIN — LIDOCAINE 1 PATCH: 4 PATCH TOPICAL at 14:53

## 2024-02-17 RX ADMIN — FENTANYL CITRATE 50 MCG: 50 INJECTION, SOLUTION INTRAMUSCULAR; INTRAVENOUS at 19:14

## 2024-02-17 RX ADMIN — ACETAMINOPHEN 650 MG: 325 TABLET ORAL at 22:04

## 2024-02-17 RX ADMIN — ACETAMINOPHEN 325 MG: 325 TABLET ORAL at 23:45

## 2024-02-17 RX ADMIN — LORAZEPAM 0.5 MG: 2 INJECTION, SOLUTION INTRAMUSCULAR; INTRAVENOUS at 18:49

## 2024-02-17 RX ADMIN — KETOROLAC TROMETHAMINE 30 MG: 30 INJECTION, SOLUTION INTRAMUSCULAR; INTRAVENOUS at 11:45

## 2024-02-17 RX ADMIN — GABAPENTIN 300 MG: 300 CAPSULE ORAL at 21:24

## 2024-02-17 RX ADMIN — GABAPENTIN 300 MG: 300 CAPSULE ORAL at 06:11

## 2024-02-17 ASSESSMENT — COGNITIVE AND FUNCTIONAL STATUS - GENERAL
CLIMB 3 TO 5 STEPS WITH RAILING: A LITTLE
TURNING FROM BACK TO SIDE WHILE IN FLAT BAD: A LITTLE
WALKING IN HOSPITAL ROOM: A LITTLE
MOBILITY SCORE: 19
DAILY ACTIVITIY SCORE: 24
MOVING TO AND FROM BED TO CHAIR: A LITTLE
STANDING UP FROM CHAIR USING ARMS: A LITTLE

## 2024-02-17 ASSESSMENT — PAIN SCALES - GENERAL
PAINLEVEL_OUTOF10: 9
PAINLEVEL_OUTOF10: 0 - NO PAIN
PAINLEVEL_OUTOF10: 7
PAINLEVEL_OUTOF10: 8
PAINLEVEL_OUTOF10: 0 - NO PAIN
PAINLEVEL_OUTOF10: 0 - NO PAIN
PAINLEVEL_OUTOF10: 8
PAINLEVEL_OUTOF10: 10 - WORST POSSIBLE PAIN
PAINLEVEL_OUTOF10: 7
PAINLEVEL_OUTOF10: 0 - NO PAIN
PAINLEVEL_OUTOF10: 10 - WORST POSSIBLE PAIN
PAINLEVEL_OUTOF10: 10 - WORST POSSIBLE PAIN
PAINLEVEL_OUTOF10: 7
PAINLEVEL_OUTOF10: 10 - WORST POSSIBLE PAIN
PAINLEVEL_OUTOF10: 10 - WORST POSSIBLE PAIN
PAINLEVEL_OUTOF10: 7
PAINLEVEL_OUTOF10: 8
PAINLEVEL_OUTOF10: 10 - WORST POSSIBLE PAIN
PAINLEVEL_OUTOF10: 7
PAINLEVEL_OUTOF10: 7

## 2024-02-17 ASSESSMENT — PAIN - FUNCTIONAL ASSESSMENT

## 2024-02-17 ASSESSMENT — PAIN DESCRIPTION - DESCRIPTORS: DESCRIPTORS: THROBBING

## 2024-02-17 ASSESSMENT — PAIN DESCRIPTION - LOCATION
LOCATION: GENERALIZED
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
## 2024-02-17 NOTE — SIGNIFICANT EVENT
Rapid Response RN Note    Rapid response RN at bedside for RADAR score 6 due to the following VS: T 37.4 °Celsius; HR 85 ; RR 60; /74; SPO2 95%.     Patient is having sickle cell related pain. Bedside RN is starting a dilaudid PCA now.  RN to reassess after patient is medicated.    Staff to page rapid response for any concerns or acute change in condition/VS.

## 2024-02-17 NOTE — CARE PLAN
The patient's goals for the shift include      The clinical goals for the shift include Pt will have improved pain mamagement and willl remain HD stable overnight      Problem: Pain  Goal: My pain/discomfort is manageable  Outcome: Progressing     Problem: Safety  Goal: Patient will be injury free during hospitalization  Outcome: Progressing  Goal: I will remain free of falls  Outcome: Progressing     Problem: Daily Care  Goal: Daily care needs are met  Outcome: Progressing     Problem: Psychosocial Needs  Goal: Demonstrates ability to cope with hospitalization/illness  Outcome: Progressing  Goal: Collaborate with me, my family, and caregiver to identify my specific goals  Outcome: Progressing     Problem: Discharge Barriers  Goal: My discharge needs are met  Outcome: Progressing

## 2024-02-17 NOTE — PROGRESS NOTES
"Vancomycin Dosing by Pharmacy- INITIAL    Joellen Narayan is a 23 y.o. year old male who Pharmacy has been consulted for vancomycin dosing for pneumonia. Based on the patient's indication and renal status this patient will be dosed based on a goal AUC of 400-600.     Renal function is currently stable.    Visit Vitals  /74 (BP Location: Left arm)   Pulse 85   Temp 37.4 °C (99.3 °F)   Resp (!) 60        Lab Results   Component Value Date    CREATININE 0.60 02/16/2024    CREATININE 0.68 02/16/2024    CREATININE 0.59 02/14/2024    CREATININE 0.50 02/13/2024        Patient weight is No results found for: \"PTWEIGHT\"    No results found for: \"CULTURE\"     I/O last 3 completed shifts:  In: 1410.4 (19.4 mL/kg) [P.O.:50; I.V.:260.4 (3.6 mL/kg); IV Piggyback:1100]  Out: 375 (5.2 mL/kg) [Urine:375 (0.1 mL/kg/hr)]  Weight: 72.6 kg   [unfilled]    Lab Results   Component Value Date    PATIENTTEMP 37.0 03/17/2023    PATIENTTEMP 37.0 11/30/2022          Assessment/Plan     Patient has already been given a loading dose of 2000 mg.  Will initiate vancomycin maintenance,  1250 mg every 12 hours.    This dosing regimen is predicted by [employer] to result in the following pharmacokinetic parameters:  Regimen: 1250 mg IV every 12 hours.  Start time: 04:16 on 02/17/2024  Exposure target: AUC24 (range)400-600 mg/L.hr   AUC24,ss: 463 mg/L.hr  Probability of AUC24 > 400: 65 %  Ctrough,ss: 12.8 mg/L  Probability of Ctrough,ss > 20: 20 %  Probability of nephrotoxicity (Lodise THOMAS 2009): 8 %      Follow-up level will be ordered on 2/18 at Am labs unless clinically indicated sooner.  Will continue to monitor renal function daily while on vancomycin and order serum creatinine at least every 48 hours if not already ordered.  Follow for continued vancomycin needs, clinical response, and signs/symptoms of toxicity.       Chao Taylor, PharmD       "

## 2024-02-17 NOTE — PROGRESS NOTES
"Joellen Narayan is a 23 y.o. male on day 0 of admission presenting with Sickle-cell disease with pain (CMS/HCC).    Subjective   Pt seen at bedside this AM. Appears to be in severe pain (PCA had just been hung 30 min ago). Reports he hasn't felt this terrible in a long time, says this feels worse than his presentation when he went into acute chest in Feb 2023. We discussed likely plan for urgent exchange I/s/o acute chest syndrome presentation, pt initially declining as he \"hates getting the line in.\" Extensive discussion had on ACS as a medical emergency and risks if remained untreated and pt currently meeting multiple criteria points. Pt agreeable to exchange and line.          Objective     Physical Exam  Constitutional:       General: He is in acute distress.      Appearance: He is ill-appearing.   HENT:      Head: Normocephalic.      Right Ear: External ear normal.      Left Ear: External ear normal.      Nose: Nose normal.   Eyes:      Extraocular Movements: Extraocular movements intact.      Conjunctiva/sclera: Conjunctivae normal.      Pupils: Pupils are equal, round, and reactive to light.   Cardiovascular:      Rate and Rhythm: Normal rate and regular rhythm.   Pulmonary:      Effort: Respiratory distress present.      Comments: tachypneic  Abdominal:      General: Bowel sounds are normal.      Palpations: Abdomen is soft.   Musculoskeletal:      Cervical back: Normal range of motion and neck supple.   Skin:     General: Skin is warm.      Comments: diaphoretic   Neurological:      Mental Status: He is oriented to person, place, and time.      Motor: Weakness present.   Psychiatric:         Mood and Affect: Mood normal.         Behavior: Behavior normal.         Thought Content: Thought content normal.         Last Recorded Vitals  Blood pressure 154/85, pulse 81, temperature 38 °C (100.4 °F), temperature source Temporal, resp. rate 24, height 1.854 m (6' 1\"), weight 72.6 kg (160 lb), SpO2 97 " %.  Intake/Output last 3 Shifts:  I/O last 3 completed shifts:  In: 2464.2 (34 mL/kg) [P.O.:410; I.V.:954.2 (13.1 mL/kg); IV Piggyback:1100]  Out: 775 (10.7 mL/kg) [Urine:775 (0.3 mL/kg/hr)]  Weight: 72.6 kg     Relevant Results       .Scheduled medications  acyclovir, 400 mg, oral, q12h REYNALDO  allopurinol, 300 mg, oral, Daily  cefepime, 2 g, intravenous, q8h  enoxaparin, 40 mg, subcutaneous, q24h  folic acid, 1 mg, oral, Daily  gabapentin, 300 mg, oral, q8h REYNALDO  ketorolac, 30 mg, intravenous, q6h REYNALDO  magnesium hydroxide, 30 mL, oral, Daily  pantoprazole, 40 mg, oral, Daily before breakfast  polyethylene glycol, 17 g, oral, BID  sennosides-docusate sodium, 2 tablet, oral, BID  vancomycin, 1,250 mg, intravenous, q12h      Continuous medications  HYDROmorphone,   lactated Ringer's, 75 mL/hr, Last Rate: 75 mL/hr (02/17/24 0805)      PRN medications  PRN medications: acetaminophen, diclofenac sodium, diphenhydrAMINE, naloxone, ondansetron    .  Results for orders placed or performed during the hospital encounter of 02/16/24 (from the past 24 hour(s))   ECG 12 lead   Result Value Ref Range    Ventricular Rate 82 BPM    Atrial Rate 468 BPM    QRS Duration 100 ms    QT Interval 394 ms    QTC Calculation(Bazett) 460 ms    P Axis 38 degrees    R Axis 65 degrees    T Axis 266 degrees    QRS Count 13 beats    Q Onset 215 ms    P Onset 129 ms    P Offset 162 ms    T Offset 412 ms    QTC Fredericia 437 ms   Lactate   Result Value Ref Range    Lactate 1.7 0.4 - 2.0 mmol/L   Blood Culture    Specimen: Peripheral Venipuncture; Blood culture   Result Value Ref Range    Blood Culture Loaded on Instrument - Culture in progress    Blood Culture    Specimen: Peripheral Venipuncture; Blood culture   Result Value Ref Range    Blood Culture Loaded on Instrument - Culture in progress    Creatinine, Serum   Result Value Ref Range    Creatinine 0.60 0.50 - 1.30 mg/dL    eGFR >90 >60 mL/min/1.73m*2   Type And Screen   Result Value Ref Range     ABO TYPE B     Rh TYPE POS     ANTIBODY SCREEN NEG    Type and screen   Result Value Ref Range    ABO TYPE B     Rh TYPE POS     ANTIBODY SCREEN NEG    Calcium, ionized   Result Value Ref Range    POCT Calcium, Ionized 1.21 1.1 - 1.33 mmol/L   Urinalysis with Reflex Microscopic   Result Value Ref Range    Color, Urine Light-Orange (N) Light-Yellow, Yellow, Dark-Yellow    Appearance, Urine Clear Clear    Specific Gravity, Urine 1.048 (N) 1.005 - 1.035    pH, Urine 6.0 5.0, 5.5, 6.0, 6.5, 7.0, 7.5, 8.0    Protein, Urine 10 (TRACE) NEGATIVE, 10 (TRACE), 20 (TRACE) mg/dL    Glucose, Urine Normal Normal mg/dL    Blood, Urine NEGATIVE NEGATIVE    Ketones, Urine NEGATIVE NEGATIVE mg/dL    Bilirubin, Urine NEGATIVE NEGATIVE    Urobilinogen, Urine Normal Normal mg/dL    Nitrite, Urine NEGATIVE NEGATIVE    Leukocyte Esterase, Urine NEGATIVE NEGATIVE   Microscopic Only, Urine   Result Value Ref Range    WBC, Urine 1-5 1-5, NONE /HPF    RBC, Urine 1-2 NONE, 1-2, 3-5 /HPF    Mucus, Urine FEW Reference range not established. /LPF   Screen Opiate/Opioid/Benzo Prescription Compliance   Result Value Ref Range    Creatinine, Urine Random 100.3 20.0 - 370.0 mg/dL    Amphetamine Screen, Urine Presumptive Negative Presumptive Negative    Barbiturate Screen, Urine Presumptive Negative Presumptive Negative    Cannabinoid Screen, Urine Presumptive Positive (A) Presumptive Negative    Cocaine Metabolite Screen, Urine Presumptive Negative Presumptive Negative    PCP Screen, Urine Presumptive Negative Presumptive Negative   CBC and Auto Differential   Result Value Ref Range    WBC 14.6 (H) 4.4 - 11.3 x10*3/uL    nRBC 6.6 (H) 0.0 - 0.0 /100 WBCs    RBC 2.81 (L) 4.50 - 5.90 x10*6/uL    Hemoglobin 7.7 (L) 13.5 - 17.5 g/dL    Hematocrit 21.5 (L) 41.0 - 52.0 %    MCV 77 (L) 80 - 100 fL    MCH 27.4 26.0 - 34.0 pg    MCHC 35.8 32.0 - 36.0 g/dL    RDW 23.8 (H) 11.5 - 14.5 %    Platelets 340 150 - 450 x10*3/uL    Neutrophils % 73.6 40.0 - 80.0 %     Immature Granulocytes %, Automated 1.2 (H) 0.0 - 0.9 %    Lymphocytes % 12.1 13.0 - 44.0 %    Monocytes % 12.3 2.0 - 10.0 %    Eosinophils % 0.5 0.0 - 6.0 %    Basophils % 0.3 0.0 - 2.0 %    Neutrophils Absolute 10.73 (H) 1.20 - 7.70 x10*3/uL    Immature Granulocytes Absolute, Automated 0.18 0.00 - 0.70 x10*3/uL    Lymphocytes Absolute 1.76 1.20 - 4.80 x10*3/uL    Monocytes Absolute 1.79 (H) 0.10 - 1.00 x10*3/uL    Eosinophils Absolute 0.08 0.00 - 0.70 x10*3/uL    Basophils Absolute 0.05 0.00 - 0.10 x10*3/uL   Comprehensive Metabolic Panel   Result Value Ref Range    Glucose 78 74 - 99 mg/dL    Sodium 137 136 - 145 mmol/L    Potassium 3.8 3.5 - 5.3 mmol/L    Chloride 106 98 - 107 mmol/L    Bicarbonate 21 21 - 32 mmol/L    Anion Gap 14 10 - 20 mmol/L    Urea Nitrogen 10 6 - 23 mg/dL    Creatinine 0.65 0.50 - 1.30 mg/dL    eGFR >90 >60 mL/min/1.73m*2    Calcium 9.5 8.6 - 10.6 mg/dL    Albumin 4.0 3.4 - 5.0 g/dL    Alkaline Phosphatase 79 33 - 120 U/L    Total Protein 6.3 (L) 6.4 - 8.2 g/dL    AST 52 (H) 9 - 39 U/L    Bilirubin, Total 7.7 (H) 0.0 - 1.2 mg/dL    ALT 15 10 - 52 U/L   Lactate Dehydrogenase   Result Value Ref Range     (H) 84 - 246 U/L   Reticulocytes   Result Value Ref Range    Retic % 21.8 (H) 0.5 - 2.0 %    Retic Absolute 0.614 (H) 0.022 - 0.118 x10*6/uL    Reticulocyte Hemoglobin 28 28 - 38 pg    Immature Retic fraction 14.7 <=16.0 %                  Assessment/Plan   Principal Problem:    Sickle-cell disease with pain (CMS/HCC)  Active Problems:    Sickle cell crisis (CMS/HCC)    Joellen Narayan is a 23 y.o. 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 Of note, patient was just admitted for sickle cell pain management from 2/12-2/14. In ED, pt found to have mildly elevated temp of 37.5 and leukocytosis of 23.3k which is higher than his baseline and atypical of his sickle cell pain presentations. Of note, he was previously on prednisone for his lymphoma treatment (days 1-5) but last received on 2/13 and previous labs did not indicate such pronounced leukocytosis. CXR (2/16) negative for consolidation. COVID, Flu A/B (2/16) negative. Resp viral panel pending. Blood cultures x2 (2/16-NGTD). Started on Cefepime and Vanc (2/16-current) in ED, s/p Flagly once. Noted some RLQ abdominal pain in ED, and elevated D-dimer 6,363. CT PE neg PE but showed L > R GGOs c/f infectious process vs Acute chest syndrome. 2/17 Pt developed new O2 requirement to 3L, became febrile 38, has chest pain, and has toxic appearance meeting criteria for ACS. Transfusion Med consulted for urgent RBC exchange and IR/MICU consulted for apheresis line placement. CT A/P w/o evidence of acute process. Started on dPCA (2/17).      # Hgb SS with severe pain  # Acute Chest Syndrome   - Follows in  sickle cell clinic, last seen on 1/23/24  - Not on any disease modifying medications (per outpatient note 1/23/24, was sent oxybryta rx for DMT but denied by insurance, must trial endari first, sent rx for endari but patient has yet to start it)  - Hgb baseline ~8.5, Hgb 8.5 (2/16); no indication for simple transfusion at this time  - Tbili baseline fluctuates ~5-13, Tbili 7.7 (2/17)  - LDH baseline fluctuates but ~500,  (2/17)  - OARRS reviewed, no aberrancies  - No current care path  - Mildly elevated temp in ED of 37.5 and leukocytosis of 23.3k with left shift (2/16), baseline ~13 with previous sickle cell pain admissions  - s/p prednisone as part of chemo regimen on days 1-5 but last received 2/13 and labs did not indicate such pronounced leukocytosis  at that time    - 2V CXR (2/16) negative for consolidation  - COVID, Flu A/B (2/16) negative  - Resp viral panel (2/16) pending  - s/p on admit started IV dilaudid 4mg q2hrs PRN severe pain (2/16-2/17); was writhing in pain and appears in significant distress  - 2/17 started on dPCA bolus 0.4mg, lockout 6 min, breakthrough RN dose 0.6mg, 1 hr limit 3mg  - Started IVP Toradol 30mg q6hrs x3 days (2/16-2/19) with Protonix for PPI prophy; monitor sCr closely as pt received IV Toradol during recent hospital admission  - c/w home folic acid 1mg daily  - 2V CXR (2/16) negative for consolidation  - Hgb S (2/16) prelim 77.5% per lab, other exchange labs pending   - Utox (2/16) +MJ  - c/w PO Zofran PRN for opioid-induced nausea, PO Benadryl PRN for opioid-induced pruritus, and Bowel regimen for opioid-induced constipation with DocuSenna 2tabs BID and Miralax daily   - CT PE showed negative PE but L > R basilar GGOs w/ more consolidative opacity within LLL c/f c/f infectious process vs acute chest syndrome  - s/p Flagly once in ED, started Cefepime (2/16- current) and Vanc (2/16- current) - pt has ceftriaxone allergy on file but reports no issue/SEs with Cefepime  - 2/17 AM Pt became febrile 38, developed new O2 requirement to 3L NC, reports chest pain, and has toxic appearance, pt briefly tachypneic 60s prior to PCA being started now improved 30s, reports feel worse than his ACS episode from last Feb 2023   - ACS is defined as radiographic evidence of consolidation plus fever > 38.5, New oxygen req, severe chest pain, Respiratory distress or new wheezing  - Both IR on call attending and MICU consulted for urgent apheresis line placement as it is the weekend, awaiting plan   - Transfusion Med consulted for urgent RBC exchange   - Blood cultures x2 (2/16- NGTD)  - Lactate level 1.7, Procal pending  - Noted some RLQ abdominal pain in ED, and elevated D-dimer 6,363  - CT A/P with contrast ordered to r/o PE and intra-abdominal  pathology showed no acute abdominopelvic abnormality, cholelithiasis, extensive adenopathy improved from prior PET 12/2023   - Amylase 15, lipase 4     # Nodular lymphocyte predominant Hodgkins lymphoma (NLPHL)   - Primary Oncologist: Dr. Stoll-- emailed/updated on admit 2/16  - Enlarged lymph nodes noted 4/1/22  - RUQ US (11/14/22) with mildly enlarged LNs in the region of the kavin hepatis  - MRI liver (11/16/22) showed re-demonstration of bulky retroperitoneal lymphadenopathy and kavin hepatic lymphadenopathy    - (11/18/22) lymph node biopsy showed atypical lymphoid infiltrate. Reviewed by Hemepath board, insufficient for lymphoma diagnosis  - PET/CT (12/6/22) showing retroperitoneal lymphadenopathy  - Followed up with Dr. Stoll (12/16/22) with plan for surg/onc consult for large tissue bx but patient missed apt and was lost to follow up  - Requested new apt with Dr. Ronnie Marte 6/19/23, patient was not seen and lost to follow up  - CT a/p (11/28/23) increased size of retroperitoneal lymph nodes reflecting extramedullary hematopoiesis I/s/o sickle cell vs lymphoma  - Paraaortic LN bx via IR (11/30/23) consistent with NLPHL. Flow: no clonal B cell or T cell population identified, lymphocyte 95%, CD3+CD4+ 68%, CD3+CD8+ 7%, CD19+ 24%  - Elevated LDH/bili partially from sickle cell disease   - Chemotherapy (R-CHOP) was discussed with primary oncologist Dr. Stoll, and decision was made to simplify his chemotherapy to Rituxan and prednisone q3 weeks mainly due to frequent sickle cell crisis  - Current chemo regimen: rituxan and prednisone q3 weeks (C1 1/18/24, C2 2/8/24, C3 due 2/29/24)  - 2/16 started Acyclovir 400mg BID prophy per Dr. Stoll     # Hx of nocturnal oxygen dependence and hypoxia on room air  - Historically improves with oxygen supplementation  - No O2 needs on admission, SpO2 stable  - Has not had home oxygen for 2-3 years   - Pt did not qualify for home O2 per walking pulse ox performed during  previous hospital admission on 2/14/24  - 2/17 Developed new 3L O2 requirement   - Has pulm apt on 2/21/24     DVT prophy: Lovenox subcutaneous, SCDs, encourage ambulation     DISPO:  - Full Code  - DC home no needs pending improvement in pain and infectious process  - Gastro NPV and Pulm NPV on 2/21, Infusion for C3 ritux on 2/29. Sickle Cell FUV (Renee Barrera) on 3/20     I spent >75 minutes in the professional and overall care of this patient.     Assessment and plan discussed with attending physician Dr. James Ochoa.    RAAD Tom-CNP

## 2024-02-17 NOTE — SIGNIFICANT EVENT
This provider has attempted since early morning to obtain an apheresis line for urgent exchange and has asked several services: on-call IR attending, MICU, CTICU, TSICU, ACS, Nephrology, and ED have all been contacted and state they are unavailable to perform line placement. Attending Dr. Ochoa aware. Hematology and Transfusion Medicine made aware of difficulty obtaining line placement as well. Pt current VS: 37.1, HR 75, R 22, 147/78, 98% on 3L NC and hemodynamically stable however will require urgent exchange at some point. MICU fellow notified of multiple attempts made. Ongoing plan as of now is to cont IV atbx, pain mgmt, IVF, and O2 support. Bedside RN updated.

## 2024-02-17 NOTE — CARE PLAN
Problem: Pain  Goal: My pain/discomfort is manageable  Outcome: Progressing     Problem: Safety  Goal: Patient will be injury free during hospitalization  Outcome: Progressing  Goal: I will remain free of falls  Outcome: Progressing     Problem: Daily Care  Goal: Daily care needs are met  Outcome: Progressing     Problem: Psychosocial Needs  Goal: Demonstrates ability to cope with hospitalization/illness  Outcome: Progressing  Goal: Collaborate with me, my family, and caregiver to identify my specific goals  Outcome: Progressing     Problem: Discharge Barriers  Goal: My discharge needs are met  Outcome: Progressing   The patient's goals for the shift include      The clinical goals for the shift include Pt will remain safe in room and use call light during shift on 2/17/24 @1930    Pt continued to have uncontrolled pain, with hypertension, and was febrile. Pt consentede to exchange, and it was ordered. Waiting on line placement.

## 2024-02-17 NOTE — PROGRESS NOTES
Pharmacy Medication History Review    Joellen Narayan is a 23 y.o. male admitted for Sickle-cell disease with pain (CMS/[facility]). Pharmacy reviewed the patient's efakw-ag-stupdskys medications and allergies for accuracy.    The list below reflects the updated PTA list. Comments regarding how patient may be taking medications differently can be found in the Admit Orders Activity  Prior to Admission Medications   Prescriptions Last Dose Informant Patient Reported?   OLANZapine (ZyPREXA) 5 mg tablet 2/15/2024 Self No   Sig: Take 1 tablet (5 mg) by mouth once daily at bedtime For 4 days starting the evening of treatment.   allopurinol (Zyloprim) 300 mg tablet 2/15/2024 Self No   Sig: Take 1 tablet (300 mg) by mouth once daily.   folic acid (Folvite) 1 mg tablet 2/15/2024 Self No   Sig: Take 1 tablet (1 mg) by mouth once daily.   glutamine, sickle cell, (Endari) 5 gram powder in packet Unknown at patient state that he hasnt  started this medication . patient plans on starting iupon discharge  No   Sig: Take 15 g by mouth 2 times a day.   ondansetron (Zofran) 8 mg tablet Unknown Self No   Sig: Take 1 tablet (8 mg) by mouth every 8 hours if needed for nausea or vomiting.   oxyCODONE (Roxicodone) 5 mg immediate release tablet 2/15/2024 at patient states that he  has about 10 tabs left  No   Sig: Take 1 tablet (5 mg) by mouth every 6 hours if needed for severe pain (7 - 10) for up to 3 days.   polyethylene glycol (Glycolax, Miralax) 17 gram packet Unknown Self Yes   Sig: Take 17 g by mouth once daily.   predniSONE (Deltasone) 50 mg tablet 2/15/2024 Self No   Sig: Take 2 tablets (100 mg) by mouth on Days 1, 2, 3, 4, and 5 of treatment cycle.   prochlorperazine (Compazine) 10 mg tablet Unknown Self No   Sig: Take 1 tablet (10 mg) by mouth every 6 hours if needed for nausea or vomiting.   pseudoephedrine (Sudafed) 30 mg tablet Not Taking  No   Sig: Take 1 tablet (30 mg) by mouth as needed at bedtime (priaprism) for up to 10 days.  "Can take 2 tablets if needed   Patient not taking: Reported on 2/16/2024   sennosides-docusate sodium (Promise-Colace) 8.6-50 mg tablet Unknown Self Yes   Sig: Take 2 tablets by mouth every 12 hours.      Facility-Administered Medications: None      The list below reflects the updated allergy list. Please review each documented allergy for additional clarification and justification.  Allergies  Reviewed by Magan Patterson CPhT on 2/16/2024        Severity Reactions Comments    Amoxicillin Low Hives     Ceftriaxone Low Hives, Rash             Patient accepts M2B at discharge. Pharmacy has been updated to [facility] .    Sources used to complete the med history include   Allergy list from [id number]   [facility] ( yes )  Epic dispense history  Patient interview   2/12/2024 progress note pharmacy ( ce )  2/14/2024 discharge summary ( oliva )    Below are additional concerns with the patient's PTA list.  Patient is a good historian despite being in pain  Patient confirms that he knows that he is to take gultamine 5 gram packet   patient states that \" *I  haven't gotten that from the pharmacy. \"  Patient states that he has about 10 tabs left of oxycodone 5 mg     Oarrs   Oxycodone 5 mg  2/14/2024 qty: 12 ds:3      Magan Patterson CPhT  Transitions of Care Pharmacy Technician  [facility]  Please reach out via "[employer] Secure Chat for questions, or if no response call [employer] or [employer] “MedRec”      "

## 2024-02-18 LAB
ALBUMIN SERPL BCP-MCNC: 3.5 G/DL (ref 3.4–5)
ALP SERPL-CCNC: 140 U/L (ref 33–120)
ALT SERPL W P-5'-P-CCNC: 16 U/L (ref 10–52)
ANION GAP SERPL CALC-SCNC: 12 MMOL/L (ref 10–20)
AST SERPL W P-5'-P-CCNC: 38 U/L (ref 9–39)
BASOPHILS # BLD AUTO: 0.02 X10*3/UL (ref 0–0.1)
BASOPHILS NFR BLD AUTO: 0.2 %
BILIRUB SERPL-MCNC: 10.6 MG/DL (ref 0–1.2)
BUN SERPL-MCNC: 7 MG/DL (ref 6–23)
CA-I BLD-SCNC: 1.16 MMOL/L (ref 1.1–1.33)
CALCIUM SERPL-MCNC: 8.8 MG/DL (ref 8.6–10.6)
CHLORIDE SERPL-SCNC: 104 MMOL/L (ref 98–107)
CO2 SERPL-SCNC: 23 MMOL/L (ref 21–32)
CREAT SERPL-MCNC: 0.64 MG/DL (ref 0.5–1.3)
EGFRCR SERPLBLD CKD-EPI 2021: >90 ML/MIN/1.73M*2
EOSINOPHIL # BLD AUTO: 0.08 X10*3/UL (ref 0–0.7)
EOSINOPHIL NFR BLD AUTO: 0.7 %
ERYTHROCYTE [DISTWIDTH] IN BLOOD BY AUTOMATED COUNT: 17.6 % (ref 11.5–14.5)
ERYTHROCYTE [DISTWIDTH] IN BLOOD BY AUTOMATED COUNT: 17.7 % (ref 11.5–14.5)
GLUCOSE SERPL-MCNC: 95 MG/DL (ref 74–99)
HCT VFR BLD AUTO: 28.1 % (ref 41–52)
HCT VFR BLD AUTO: 29.8 % (ref 41–52)
HGB BLD-MCNC: 10 G/DL (ref 13.5–17.5)
HGB BLD-MCNC: 10.5 G/DL (ref 13.5–17.5)
HGB RETIC QN: 28 PG (ref 28–38)
IMM GRANULOCYTES # BLD AUTO: 0.12 X10*3/UL (ref 0–0.7)
IMM GRANULOCYTES NFR BLD AUTO: 1.1 % (ref 0–0.9)
IMMATURE RETIC FRACTION: 14.1 %
LDH SERPL L TO P-CCNC: 747 U/L (ref 84–246)
LYMPHOCYTES # BLD AUTO: 1.72 X10*3/UL (ref 1.2–4.8)
LYMPHOCYTES NFR BLD AUTO: 15.7 %
MCH RBC QN AUTO: 29.2 PG (ref 26–34)
MCH RBC QN AUTO: 29.8 PG (ref 26–34)
MCHC RBC AUTO-ENTMCNC: 35.2 G/DL (ref 32–36)
MCHC RBC AUTO-ENTMCNC: 35.6 G/DL (ref 32–36)
MCV RBC AUTO: 82 FL (ref 80–100)
MCV RBC AUTO: 85 FL (ref 80–100)
MONOCYTES # BLD AUTO: 1.67 X10*3/UL (ref 0.1–1)
MONOCYTES NFR BLD AUTO: 15.2 %
NEUTROPHILS # BLD AUTO: 7.36 X10*3/UL (ref 1.2–7.7)
NEUTROPHILS NFR BLD AUTO: 67.1 %
NRBC BLD-RTO: 3.6 /100 WBCS (ref 0–0)
NRBC BLD-RTO: 5.7 /100 WBCS (ref 0–0)
PLATELET # BLD AUTO: 155 X10*3/UL (ref 150–450)
PLATELET # BLD AUTO: 165 X10*3/UL (ref 150–450)
POTASSIUM SERPL-SCNC: 4.2 MMOL/L (ref 3.5–5.3)
PROT SERPL-MCNC: 5.2 G/DL (ref 6.4–8.2)
RBC # BLD AUTO: 3.42 X10*6/UL (ref 4.5–5.9)
RBC # BLD AUTO: 3.52 X10*6/UL (ref 4.5–5.9)
RETICS #: 0.39 X10*6/UL (ref 0.02–0.12)
RETICS/RBC NFR AUTO: 11.3 % (ref 0.5–2)
SODIUM SERPL-SCNC: 135 MMOL/L (ref 136–145)
VANCOMYCIN TROUGH SERPL-MCNC: 9.6 UG/ML (ref 5–20)
WBC # BLD AUTO: 11 X10*3/UL (ref 4.4–11.3)
WBC # BLD AUTO: 8.5 X10*3/UL (ref 4.4–11.3)

## 2024-02-18 PROCEDURE — 2500000004 HC RX 250 GENERAL PHARMACY W/ HCPCS (ALT 636 FOR OP/ED): Performed by: NURSE PRACTITIONER

## 2024-02-18 PROCEDURE — 99233 SBSQ HOSP IP/OBS HIGH 50: CPT

## 2024-02-18 PROCEDURE — 2500000001 HC RX 250 WO HCPCS SELF ADMINISTERED DRUGS (ALT 637 FOR MEDICARE OP)

## 2024-02-18 PROCEDURE — 83020 HEMOGLOBIN ELECTROPHORESIS: CPT

## 2024-02-18 PROCEDURE — 2500000005 HC RX 250 GENERAL PHARMACY W/O HCPCS

## 2024-02-18 PROCEDURE — 2500000001 HC RX 250 WO HCPCS SELF ADMINISTERED DRUGS (ALT 637 FOR MEDICARE OP): Performed by: NURSE PRACTITIONER

## 2024-02-18 PROCEDURE — 2500000004 HC RX 250 GENERAL PHARMACY W/ HCPCS (ALT 636 FOR OP/ED)

## 2024-02-18 PROCEDURE — 85045 AUTOMATED RETICULOCYTE COUNT: CPT | Performed by: NURSE PRACTITIONER

## 2024-02-18 PROCEDURE — 83615 LACTATE (LD) (LDH) ENZYME: CPT | Performed by: NURSE PRACTITIONER

## 2024-02-18 PROCEDURE — 1170000001 HC PRIVATE ONCOLOGY ROOM DAILY

## 2024-02-18 PROCEDURE — 2500000002 HC RX 250 W HCPCS SELF ADMINISTERED DRUGS (ALT 637 FOR MEDICARE OP, ALT 636 FOR OP/ED): Performed by: NURSE PRACTITIONER

## 2024-02-18 PROCEDURE — 80053 COMPREHEN METABOLIC PANEL: CPT | Performed by: NURSE PRACTITIONER

## 2024-02-18 PROCEDURE — A4217 STERILE WATER/SALINE, 500 ML: HCPCS | Performed by: INTERNAL MEDICINE

## 2024-02-18 PROCEDURE — 36430 TRANSFUSION BLD/BLD COMPNT: CPT

## 2024-02-18 PROCEDURE — 6A550Z0 PHERESIS OF ERYTHROCYTES, SINGLE: ICD-10-PCS | Performed by: PATHOLOGY

## 2024-02-18 PROCEDURE — 2500000004 HC RX 250 GENERAL PHARMACY W/ HCPCS (ALT 636 FOR OP/ED): Performed by: INTERNAL MEDICINE

## 2024-02-18 PROCEDURE — 80202 ASSAY OF VANCOMYCIN: CPT | Performed by: INTERNAL MEDICINE

## 2024-02-18 PROCEDURE — 85025 COMPLETE CBC W/AUTO DIFF WBC: CPT | Performed by: NURSE PRACTITIONER

## 2024-02-18 PROCEDURE — 36512 APHERESIS RBC: CPT

## 2024-02-18 PROCEDURE — 83021 HEMOGLOBIN CHROMOTOGRAPHY: CPT

## 2024-02-18 PROCEDURE — P9040 RBC LEUKOREDUCED IRRADIATED: HCPCS

## 2024-02-18 PROCEDURE — 82330 ASSAY OF CALCIUM: CPT

## 2024-02-18 PROCEDURE — 85027 COMPLETE CBC AUTOMATED: CPT

## 2024-02-18 RX ORDER — LACTULOSE 10 G/15ML
20 SOLUTION ORAL 3 TIMES DAILY
Status: DISCONTINUED | OUTPATIENT
Start: 2024-02-18 | End: 2024-02-19

## 2024-02-18 RX ORDER — ADHESIVE BANDAGE
30 BANDAGE TOPICAL 2 TIMES DAILY
Status: DISCONTINUED | OUTPATIENT
Start: 2024-02-18 | End: 2024-02-19

## 2024-02-18 RX ORDER — POLYETHYLENE GLYCOL 3350 17 G/17G
17 POWDER, FOR SOLUTION ORAL 3 TIMES DAILY
Status: DISCONTINUED | OUTPATIENT
Start: 2024-02-18 | End: 2024-02-19

## 2024-02-18 RX ORDER — DICLOFENAC SODIUM 10 MG/G
4 GEL TOPICAL 4 TIMES DAILY
Status: DISCONTINUED | OUTPATIENT
Start: 2024-02-18 | End: 2024-02-18

## 2024-02-18 RX ORDER — ACYCLOVIR 400 MG/1
400 TABLET ORAL 2 TIMES DAILY
Qty: 60 TABLET | Refills: 0 | Status: SHIPPED | OUTPATIENT
Start: 2024-02-18 | End: 2024-03-25

## 2024-02-18 RX ADMIN — SODIUM CHLORIDE, POTASSIUM CHLORIDE, SODIUM LACTATE AND CALCIUM CHLORIDE 75 ML/HR: 600; 310; 30; 20 INJECTION, SOLUTION INTRAVENOUS at 21:10

## 2024-02-18 RX ADMIN — GABAPENTIN 300 MG: 300 CAPSULE ORAL at 21:08

## 2024-02-18 RX ADMIN — VANCOMYCIN HYDROCHLORIDE 1250 MG: 5 INJECTION, POWDER, LYOPHILIZED, FOR SOLUTION INTRAVENOUS at 00:54

## 2024-02-18 RX ADMIN — MAGNESIUM HYDROXIDE 30 ML: 400 SUSPENSION ORAL at 21:08

## 2024-02-18 RX ADMIN — SENNOSIDES AND DOCUSATE SODIUM 2 TABLET: 8.6; 5 TABLET ORAL at 08:52

## 2024-02-18 RX ADMIN — Medication: at 20:13

## 2024-02-18 RX ADMIN — ALLOPURINOL 300 MG: 300 TABLET ORAL at 08:54

## 2024-02-18 RX ADMIN — ACYCLOVIR 400 MG: 400 TABLET ORAL at 21:08

## 2024-02-18 RX ADMIN — CEFEPIME 2 G: 1 INJECTION, SOLUTION INTRAVENOUS at 04:29

## 2024-02-18 RX ADMIN — DICLOFENAC SODIUM TOPICAL GEL, 1% 4 G: 10 GEL TOPICAL at 13:00

## 2024-02-18 RX ADMIN — SODIUM CHLORIDE, POTASSIUM CHLORIDE, SODIUM LACTATE AND CALCIUM CHLORIDE 75 ML/HR: 600; 310; 30; 20 INJECTION, SOLUTION INTRAVENOUS at 01:00

## 2024-02-18 RX ADMIN — CEFEPIME 2 G: 1 INJECTION, SOLUTION INTRAVENOUS at 21:09

## 2024-02-18 RX ADMIN — SODIUM CHLORIDE, POTASSIUM CHLORIDE, SODIUM LACTATE AND CALCIUM CHLORIDE 75 ML/HR: 600; 310; 30; 20 INJECTION, SOLUTION INTRAVENOUS at 04:29

## 2024-02-18 RX ADMIN — LACTULOSE 20 G: 20 SOLUTION ORAL at 08:53

## 2024-02-18 RX ADMIN — KETOROLAC TROMETHAMINE 30 MG: 30 INJECTION, SOLUTION INTRAMUSCULAR; INTRAVENOUS at 17:05

## 2024-02-18 RX ADMIN — HEPARIN SODIUM 1000 UNITS: 1000 INJECTION INTRAVENOUS; SUBCUTANEOUS at 00:20

## 2024-02-18 RX ADMIN — ACYCLOVIR 400 MG: 400 TABLET ORAL at 08:52

## 2024-02-18 RX ADMIN — GABAPENTIN 300 MG: 300 CAPSULE ORAL at 06:55

## 2024-02-18 RX ADMIN — KETOROLAC TROMETHAMINE 30 MG: 30 INJECTION, SOLUTION INTRAMUSCULAR; INTRAVENOUS at 00:00

## 2024-02-18 RX ADMIN — SENNOSIDES AND DOCUSATE SODIUM 2 TABLET: 8.6; 5 TABLET ORAL at 21:08

## 2024-02-18 RX ADMIN — POLYETHYLENE GLYCOL 3350 17 G: 17 POWDER, FOR SOLUTION ORAL at 15:25

## 2024-02-18 RX ADMIN — FOLIC ACID 1 MG: 1 TABLET ORAL at 08:52

## 2024-02-18 RX ADMIN — DICLOFENAC 4 G: 10 GEL TOPICAL at 08:00

## 2024-02-18 RX ADMIN — LACTULOSE 20 G: 20 SOLUTION ORAL at 15:25

## 2024-02-18 RX ADMIN — KETOROLAC TROMETHAMINE 30 MG: 30 INJECTION, SOLUTION INTRAMUSCULAR; INTRAVENOUS at 06:55

## 2024-02-18 RX ADMIN — PANTOPRAZOLE SODIUM 40 MG: 40 TABLET, DELAYED RELEASE ORAL at 06:55

## 2024-02-18 RX ADMIN — POLYETHYLENE GLYCOL 3350 17 G: 17 POWDER, FOR SOLUTION ORAL at 08:54

## 2024-02-18 RX ADMIN — POLYETHYLENE GLYCOL 3350 17 G: 17 POWDER, FOR SOLUTION ORAL at 21:19

## 2024-02-18 RX ADMIN — DICLOFENAC SODIUM TOPICAL GEL, 1% 4 G: 10 GEL TOPICAL at 17:00

## 2024-02-18 RX ADMIN — LACTULOSE 20 G: 20 SOLUTION ORAL at 21:08

## 2024-02-18 RX ADMIN — CEFEPIME 2 G: 1 INJECTION, SOLUTION INTRAVENOUS at 12:13

## 2024-02-18 RX ADMIN — GABAPENTIN 300 MG: 300 CAPSULE ORAL at 13:55

## 2024-02-18 RX ADMIN — VANCOMYCIN HYDROCHLORIDE 1250 MG: 5 INJECTION, POWDER, LYOPHILIZED, FOR SOLUTION INTRAVENOUS at 17:05

## 2024-02-18 RX ADMIN — KETOROLAC TROMETHAMINE 30 MG: 30 INJECTION, SOLUTION INTRAMUSCULAR; INTRAVENOUS at 12:13

## 2024-02-18 RX ADMIN — VANCOMYCIN HYDROCHLORIDE 1250 MG: 5 INJECTION, POWDER, LYOPHILIZED, FOR SOLUTION INTRAVENOUS at 10:47

## 2024-02-18 RX ADMIN — ENOXAPARIN SODIUM 40 MG: 100 INJECTION SUBCUTANEOUS at 21:08

## 2024-02-18 RX ADMIN — MAGNESIUM HYDROXIDE 30 ML: 400 SUSPENSION ORAL at 08:57

## 2024-02-18 RX ADMIN — LIDOCAINE 1 PATCH: 4 PATCH TOPICAL at 08:52

## 2024-02-18 RX ADMIN — Medication: at 09:21

## 2024-02-18 ASSESSMENT — COGNITIVE AND FUNCTIONAL STATUS - GENERAL
MOBILITY SCORE: 24
DAILY ACTIVITIY SCORE: 24

## 2024-02-18 ASSESSMENT — PAIN SCALES - GENERAL
PAINLEVEL_OUTOF10: 7
PAINLEVEL_OUTOF10: 7
PAINLEVEL_OUTOF10: 8
PAINLEVEL_OUTOF10: 7
PAINLEVEL_OUTOF10: 9
PAINLEVEL_OUTOF10: 8
PAINLEVEL_OUTOF10: 8

## 2024-02-18 ASSESSMENT — PAIN - FUNCTIONAL ASSESSMENT
PAIN_FUNCTIONAL_ASSESSMENT: 0-10

## 2024-02-18 ASSESSMENT — PAIN DESCRIPTION - LOCATION: LOCATION: LEG

## 2024-02-18 NOTE — SIGNIFICANT EVENT
Rapid Response RN Note    Rapid response RN at bedside for RADAR score 7 due to the following VS: T 37.6 °Celsius; HR 91 ; RR 26; /80; SPO2 94%.     Spoke with beside RN who stated pt was having pain at this moment but otherwise had no new concerns; No interventions done by rapid response    Staff to page rapid response for any concerns or acute change in condition/VS.
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
## 2024-02-18 NOTE — CARE PLAN
Problem: Pain  Goal: My pain/discomfort is manageable  Outcome: Progressing     Problem: Safety  Goal: Patient will be injury free during hospitalization  Outcome: Progressing  Goal: I will remain free of falls  Outcome: Progressing     Problem: Daily Care  Goal: Daily care needs are met  Outcome: Progressing     Problem: Psychosocial Needs  Goal: Demonstrates ability to cope with hospitalization/illness  Outcome: Progressing  Goal: Collaborate with me, my family, and caregiver to identify my specific goals  Outcome: Progressing     Problem: Discharge Barriers  Goal: My discharge needs are met  Outcome: Progressing   The patient's goals for the shift include      The clinical goals for the shift include Pt spencer remain safe in room and use call light during shift on 2/18/21 @1930    Pt had increased pain by end of shift. Pt unable to have BM, receiving bowel regimen. Hypertensive at times. Able to tolerate RA.

## 2024-02-18 NOTE — POST-PROCEDURE NOTE
Interventional Radiology Post-Procedure Note    Non-tunneled apheresis catheter placement      Indication for procedure: The encounter diagnosis was Sickle cell crisis (CMS/[facility]).    Pre-Procedure Verification and Time Out:  · Procedure Location procedure area   · HUDDLE - Pre-procedure Verification completed   · TIME OUT - Final Verification completed immediately prior to procedure start   · DEBRIEF completed     General Information:  Date/Time of Procedure: 02/17/24 at 7:53 PM  Indication(s)/Pre - Procedure Diagnoses: Acute Chest Syndrome  Post-Procedure Diagnosis: Acute Chest Syndrome  Procedure Name: Non-tunneled apheresis catheter placement  Findings: See PACS  Procedure performed by: Sandip Dorsey MD   Assistant(s): Dr. Rakesh Pablo MD  Estimated Blood Loss (mL): minimal  Specimen: No  Informed Consent: written consent obtained    Procedure Details:    Technically successful and uncomplicated right internal jugular non-tunneled apheresis catheter placement.  Please see PACS for full procedural details.    Patient Tolerance: good  Complications: None    Prep:  Ultrasound Guided Insertion: Yes  Large Drape, Hand Hygiene, Surgical Cap, Surgical Mask, Sterile Gown, Sterile Gloves, Glasses, and Scrubs  Patient Position: Supine  Site Prep: chlorhexidine, draped, usual sterile procedure followed    Anesthesia/Medications:  Procedural Sedation:  Medications  As of 02/17/24 1953      HYDROmorphone (Dilaudid) injection 1 mg (mg) Total dose:  5 mg* Dosing weight:  72.6   *Administration not included in total     Date/Time Rate/Dose/Volume Action       02/16/24  1155 *1 mg Missed      1201 1 mg Given      1254 1 mg Given      1358 1 mg Given      1518 1 mg Given     02/17/24  0312 1 mg Given               ketorolac (Toradol) injection 15 mg (mg) Total dose:  Cannot be calculated* Dosing weight:  72.6   *Administration dose not documented     Date/Time Rate/Dose/Volume Action       02/16/24  Canceled Entry                ketorolac (Toradol) injection 15 mg (mg) Total dose:  15 mg Dosing weight:  72.6      Date/Time Rate/Dose/Volume Action       02/16/24  1225 15 mg Given      1525 *15 mg Missed               diphenhydrAMINE (BENADryl) capsule 25 mg (mg) Total dose:  25 mg Dosing weight:  72.6      Date/Time Rate/Dose/Volume Action       02/16/24  1203 25 mg Given               lactated Ringer's bolus 1,000 mL (mL/hr) Total volume:  1,000 mL* Dosing weight:  72.6   *From user-documented volume     Date/Time Rate/Dose/Volume Action       02/16/24  1207 1,000 mL - 1,000 mL/hr (over 60 min) New Bag      1307  (over 60 min) Stopped      1837 1,000 mL                cefepime (Maxipime) IV 2 g (mL/hr) Total volume:  Not documented* Dosing weight:  72.6   *Total volume has not been documented. View each administration to see the amount administered.     Date/Time Rate/Dose/Volume Action       02/16/24  1434 2 g - 200 mL/hr (over 30 min) New Bag      1504  (over 30 min) Stopped               cefepime (Maxipime) IV 2 g (mL/hr) Total dose:  2 g* Dosing weight:  72.6   *From user-documented volume     Date/Time Rate/Dose/Volume Action       02/17/24  1048 2 g - 200 mL/hr (over 30 min) - 100 mL New Bag      1118  (over 30 min) Stopped               vancomycin (Vancocin) 2 g in dextrose 5% water 400 mL (mL/hr) Total volume:  Not documented* Dosing weight:  72.6   *Total volume has not been documented. View each administration to see the amount administered.     Date/Time Rate/Dose/Volume Action       02/16/24  1616 2 g - 200 mL/hr (over 120 min) New Bag      1816  (over 120 min) Stopped               metroNIDAZOLE (Flagyl) 500 mg in NaCl (iso-os) 100 mL (mg) Total dose:  500 mg* Dosing weight:  72.6   *From user-documented volume     Date/Time Rate/Dose/Volume Action       02/16/24  2006 500 mg (over 60 min) New Bag      2106 100 mL Stopped               allopurinol (Zyloprim) tablet 300 mg (mg) Total dose:  600 mg Dosing weight:  72.8       Date/Time Rate/Dose/Volume Action       02/16/24  1440 300 mg Given     02/17/24  0811 300 mg Given               folic acid (Folvite) tablet 1 mg (mg) Total dose:  2 mg Dosing weight:  72.6      Date/Time Rate/Dose/Volume Action       02/16/24  1639 1 mg Given     02/17/24  0811 1 mg Given               polyethylene glycol (Glycolax, Miralax) packet 17 g (g) Total dose:  34 g Dosing weight:  72.6      Date/Time Rate/Dose/Volume Action       02/16/24  1639 17 g Given     02/17/24  0811 17 g Given               sennosides-docusate sodium (Promise-Colace) 8.6-50 mg per tablet 2 tablet (tablet) Total dose:  4 tablet* Dosing weight:  72.6   *Administration not included in total     Date/Time Rate/Dose/Volume Action       02/16/24  1639 2 tablet Given      2006 2 tablet Given     02/17/24  0900 *2 tablet Missed               enoxaparin (Lovenox) syringe 40 mg (mg) Total dose:  40 mg Dosing weight:  72.6      Date/Time Rate/Dose/Volume Action       02/16/24  2007 40 mg Given     02/17/24  1804 *Not included in total Held by provider               pantoprazole (ProtoNix) EC tablet 40 mg (mg) Total dose:  40 mg Dosing weight:  72.6      Date/Time Rate/Dose/Volume Action       02/17/24  0611 40 mg Given               ketorolac (Toradol) injection 30 mg (mg) Total dose:  150 mg Dosing weight:  72.6      Date/Time Rate/Dose/Volume Action       02/16/24  1719 30 mg Given      2347 30 mg Given     02/17/24  0611 30 mg Given      1145 30 mg Given      1732 30 mg Given               HYDROmorphone PF (Dilaudid) injection 4 mg (mg) Total dose:  28 mg Dosing weight:  72.6      Date/Time Rate/Dose/Volume Action       02/16/24  1548 4 mg Given      1758 4 mg Given      2005 4 mg Given      2209 4 mg Given     02/17/24  0010 4 mg Given      0210 4 mg Given      0410 4 mg Given               acyclovir (Zovirax) tablet 400 mg (mg) Total dose:  800 mg Dosing weight:  72.6      Date/Time Rate/Dose/Volume Action       02/16/24 2008 400 mg  Given     02/17/24  0811 400 mg Given               iohexol (OMNIPaque) 350 mg iodine/mL solution 80 mL (mL) Total volume:  80 mL Dosing weight:  72.6      Date/Time Rate/Dose/Volume Action       02/16/24  1814 80 mL Given               sodium chloride 0.9% infusion (mL/hr) Total volume:  1,214.17 mL* Dosing weight:  72.6   *From user-documented volume     Date/Time Rate/Dose/Volume Action       02/16/24  2222 125 mL/hr New Bag     02/17/24  0027 125 mL/hr - 260.42 mL Rate Verify      0600 693.75 mL       0746 *125 mL/hr Missed      0805 260 mL                acetaminophen (Tylenol) tablet 650 mg (mg) Total dose:  1,300 mg Dosing weight:  72.6      Date/Time Rate/Dose/Volume Action       02/16/24  2222 650 mg Given     02/17/24  1123 650 mg Given               gabapentin (Neurontin) capsule 300 mg (mg) Total dose:  900 mg Dosing weight:  72.6      Date/Time Rate/Dose/Volume Action       02/16/24  2222 300 mg Given     02/17/24  0611 300 mg Given      1453 300 mg Given               hydromorphone PCA 0.5 mg/mL in NS opioid tolerant Total dose:  Cannot be calculated* Dosing weight:  72.6   *Administration does not have dose documented     Date/Time Rate/Dose/Volume Action       02/17/24  0641  New Syringe/Cartridge      0804  Rate Change      1146  New Syringe/Cartridge               lactated Ringer's infusion (mL/hr) Total volume:  806.25 mL* Dosing weight:  72.6   *From user-documented volume     Date/Time Rate/Dose/Volume Action       02/17/24  0805 75 mL/hr New Bag      1338 75 mL/hr - 416.25 mL Rate Verify      1726 285 mL       1850 105 mL                vancomycin (Vancocin) in 0.9 % sodium chloride 250 mL IV 1,250 mg (mL/hr) Total dose:  1,250 mg* Dosing weight:  72.6   *From user-documented volume     Date/Time Rate/Dose/Volume Action       02/17/24  1245 1,250 mg - 166.7 mL/hr (over 90 min) New Bag      1338 250 mL       1415  (over 90 min) Stopped               diclofenac sodium (Voltaren) 1 % gel 4 g (g)  Total dose:  4 g* Dosing weight:  72.6   *Administration not included in total     Date/Time Rate/Dose/Volume Action       02/17/24  1445 *4 g Missed      1732 4 g Given               lidocaine 4 % patch 1 patch (patch) Total dose:  1 patch Dosing weight:  72.6      Date/Time Rate/Dose/Volume Action       02/17/24  1453 1 patch (over 720 min) Medication Applied               LORazepam (Ativan) injection 0.5 mg (mg) Total dose:  0.5 mg Dosing weight:  72.6      Date/Time Rate/Dose/Volume Action       02/17/24  1849 0.5 mg (over 5 min) Given               fentaNYL PF (Sublimaze) injection (mcg) Total dose:  100 mcg      Date/Time Rate/Dose/Volume Action       02/17/24  1914 50 mcg Given      1923 50 mcg Given               midazolam (Versed) injection (mg) Total dose:  2 mg      Date/Time Rate/Dose/Volume Action       02/17/24  1914 1 mg Given      1923 1 mg Given                   Attending Attestation:  I was present for the entire procedure    Sandip Dorsey MD, PGY-5  Interventional Radiology  IR pager: [phone]    NON-Urgent on call weekends and after hours weekdays (5pm - 5am) IR pager: [phone]  Urgent & emergent on call weekends and after hours weekdays (5pm-7am) IR pager: [phone]

## 2024-02-18 NOTE — POST-PROCEDURE NOTE
This is a 23 y.o. male with Acute Chest Syndrome Secondary to Sickle Cell Disease who underwent automated red blood cell exchange (RCE) with 8 RBC units with target HgbS 28% and target HCT 28%.     I saw and evaluated the patient during the RCE.  The patient was resting in bed.  Vascular access functioned well.    WBC   Date/Time Value Ref Range Status   02/17/2024 07:30 AM 14.6 (H) 4.4 - 11.3 x10*3/uL Final     Hemoglobin   Date/Time Value Ref Range Status   02/17/2024 07:30 AM 7.7 (L) 13.5 - 17.5 g/dL Final     Hematocrit   Date/Time Value Ref Range Status   02/17/2024 07:30 AM 21.5 (L) 41.0 - 52.0 % Final     Platelets   Date/Time Value Ref Range Status   02/17/2024 07:30  150 - 450 x10*3/uL Final        POCT Calcium, Ionized   Date/Time Value Ref Range Status   02/16/2024 10:24 PM 1.21 1.1 - 1.33 mmol/L Final     Comment:     The performance characteristics of ionized calcium tested  in heparinized plasma or serum have been validated by the  individual  laboratory site where testing is performed.   Testing on heparinized plasma or serum is not approved by   the FDA; however, such approval is not necessary.        Hemoglobin S   Date/Time Value Ref Range Status   02/14/2024 10:26 AM 80.3 (H) <=0.0 % Preliminary        Ferritin   Date/Time Value Ref Range Status   12/15/2022 02:42  (H) 20 - 300 ug/L Final        Pre-procedure vital signs at 2220:  T: 39.4 C, HR: 101, RR: 24, BP: 175/82  Pulse oximetry: 92% on 3L O2 via nasal cannula.    Post-procedure vital signs at 12:15 a.m.:  T: 37.5 C, HR: 102, RR: 24, BP: 158/92  Pulse oximetry: 97% on 3L O2 via nasal cannula.    Outcome: RCE completed.  Adverse Reaction: No    Assessment and Plan:  23 y.o. male with Acute Chest Syndrome Secondary to Sickle Cell Disease who underwent RCE.    Draw post-procedure labs  Vascular access to be managed by the clinical team.  No further RCE planned for this admission

## 2024-02-18 NOTE — PROGRESS NOTES
"Vancomycin Dosing by Pharmacy- FOLLOW UP    Joellen Narayan is a 23 y.o. year old male who Pharmacy has been consulted for vancomycin dosing for pneumonia. Based on the patient's indication and renal status this patient is being dosed based on a goal AUC of 400-600.     Renal function is currently stable.    Current vancomycin dose: 1250 mg given every 12 hours    Estimated vancomycin AUC on current dose: 327 mg/L.hr     Visit Vitals  /80   Pulse 85   Temp 37.6 °C (99.7 °F) (Temporal)   Resp 14        Lab Results   Component Value Date    CREATININE 0.64 02/18/2024    CREATININE 0.65 02/17/2024    CREATININE 0.60 02/16/2024    CREATININE 0.68 02/16/2024        Patient weight is No results found for: \"PTWEIGHT\"    No results found for: \"CULTURE\"     I/O last 3 completed shifts:  In: 4988.2 (68.7 mL/kg) [P.O.:1210; I.V.:2878.2 (39.7 mL/kg); IV Piggyback:900]  Out: 3100 (42.7 mL/kg) [Urine:3100 (1.2 mL/kg/hr)]  Weight: 72.6 kg   [unfilled]    Lab Results   Component Value Date    PATIENTTEMP 37.0 03/17/2023    PATIENTTEMP 37.0 11/30/2022        Assessment/Plan    Below goal AUC. Orders placed for new vancomcyin regimen of 1250 mg every 8 hours to begin at 0830.     This dosing regimen is predicted by [employer] to result in the following pharmacokinetic parameters:  Loading dose: N/A  Regimen: 1250 mg IV every 8 hours.  Start time: 08:54 on 02/18/2024  Exposure target: AUC24 (range)400-600 mg/L.hr   AUC24,ss: 487 mg/L.hr  Probability of AUC24 > 400: 73 %  Ctrough,ss: 17.2 mg/L  Probability of Ctrough,ss > 20: 38 %  Probability of nephrotoxicity (Lodise THOMAS 2009): 13 %      The next level will be obtained on 2/19 at Am labs. May be obtained sooner if clinically indicated.   Will continue to monitor renal function daily while on vancomycin and order serum creatinine at least every 48 hours if not already ordered.  Follow for continued vancomycin needs, clinical response, and signs/symptoms of toxicity. "       Chao Taylor, PharmD

## 2024-02-18 NOTE — CARE PLAN
The patient's goals for the shift include      The clinical goals for the shift include Pt will be comfortable and HD stable overnight    Problem: Pain  Goal: My pain/discomfort is manageable  Outcome: Progressing     Problem: Safety  Goal: Patient will be injury free during hospitalization  Outcome: Progressing  Goal: I will remain free of falls  Outcome: Progressing     Problem: Daily Care  Goal: Daily care needs are met  Outcome: Progressing     Problem: Psychosocial Needs  Goal: Demonstrates ability to cope with hospitalization/illness  Outcome: Progressing  Goal: Collaborate with me, my family, and caregiver to identify my specific goals  Outcome: Progressing     Problem: Discharge Barriers  Goal: My discharge needs are met  Outcome: Progressing

## 2024-02-18 NOTE — PROGRESS NOTES
Joellen Narayan is a 23 y.o. male on day 1 of admission presenting with Sickle-cell disease with pain (CMS/HCC).    Subjective   Pt seen at bedside this AM. Looks significantly calmer since yesterday morning. Reports feeling okay after exchange which ended late at night but did not get much sleep. Endorses generalized pain now 6/10 and 8/10 in R leg. Upon exam, pca was off, pt reports he turned it off d/t beeping, he thought it was empty. Discouraged pt from tampering with PCA and to call for nurse if pump beeps as he states he hasn't gotten pain medicine in an hour. Will cont today and likely begin to wean off pca tomorrow, pt agreeable. Just recently took new escalated bowel regimen, hoping for a BM soon. Endorses some chills on/off throughout night. States his SOB is better.     Denies nausea, vomiting, fever, diarrhea, bleeding, headache.       Objective     Physical Exam  HENT:      Head: Normocephalic.      Right Ear: External ear normal.      Left Ear: External ear normal.      Nose: Nose normal.   Eyes:      General: Scleral icterus present.      Extraocular Movements: Extraocular movements intact.      Pupils: Pupils are equal, round, and reactive to light.   Cardiovascular:      Rate and Rhythm: Regular rhythm.   Pulmonary:      Comments: Diminished throughout all lung fields  Abdominal:      General: Abdomen is flat. Bowel sounds are normal.      Palpations: Abdomen is soft.   Musculoskeletal:      Cervical back: Normal range of motion and neck supple.      Comments: Generalized weakness   Skin:     General: Skin is warm and dry.   Neurological:      General: No focal deficit present.      Mental Status: He is alert and oriented to person, place, and time. Mental status is at baseline.   Psychiatric:         Mood and Affect: Mood normal.         Behavior: Behavior normal.         Thought Content: Thought content normal.         Judgment: Judgment normal.         Last Recorded Vitals  Blood pressure  "151/80, pulse 85, temperature 37.6 °C (99.7 °F), temperature source Temporal, resp. rate 14, height 1.854 m (6' 1\"), weight 72.6 kg (160 lb), SpO2 98 %.  Intake/Output last 3 Shifts:  I/O last 3 completed shifts:  In: 4988.2 (68.7 mL/kg) [P.O.:1210; I.V.:2878.2 (39.7 mL/kg); IV Piggyback:900]  Out: 3100 (42.7 mL/kg) [Urine:3100 (1.2 mL/kg/hr)]  Weight: 72.6 kg     Relevant Results        This patient has a central line   Reason for the central line remaining today?  Blood draws, infusions     .Scheduled medications  acyclovir, 400 mg, oral, q12h REYNALDO  allopurinol, 300 mg, oral, Daily  cefepime, 2 g, intravenous, q8h  diclofenac sodium, 4 g, Topical, 4x daily  diclofenac sodium, 4 g, Topical, 4x daily  enoxaparin, 40 mg, subcutaneous, Daily  folic acid, 1 mg, oral, Daily  gabapentin, 300 mg, oral, q8h REYNALDO  ketorolac, 30 mg, intravenous, q6h REYNALDO  lactulose, 20 g, oral, BID  lidocaine, 1 patch, transdermal, Daily  magnesium hydroxide, 30 mL, oral, Daily  pantoprazole, 40 mg, oral, Daily before breakfast  polyethylene glycol, 17 g, oral, BID  sennosides-docusate sodium, 2 tablet, oral, BID  vancomycin, 1,250 mg, intravenous, q8h      Continuous medications  HYDROmorphone,   lactated Ringer's, 75 mL/hr, Last Rate: 75 mL/hr (02/18/24 6304)      PRN medications  PRN medications: acetaminophen, albuterol, diphenhydrAMINE, naloxone, ondansetron    .  Results for orders placed or performed during the hospital encounter of 02/16/24 (from the past 24 hour(s))   MRSA Surveillance for Vancomycin De-escalation, PCR    Specimen: Anterior Nares; Swab   Result Value Ref Range    MRSA PCR Not Detected Not Detected   Coagulation Screen   Result Value Ref Range    Protime 17.2 (H) 9.8 - 12.8 seconds    INR 1.5 (H) 0.9 - 1.1    aPTT 29 27 - 38 seconds   Prepare RBC: 10 Units   Result Value Ref Range    PRODUCT CODE O5909I62     Unit Number [id number]     Unit ABO B     Unit RH NEG     XM INTEP COMP     Dispense Status TR     Blood " Expiration Date March 16, 2024 23:59 EDT     PRODUCT BLOOD TYPE 1700     UNIT VOLUME 350     PRODUCT CODE V8259F21     Unit Number [id number]     Unit ABO B     Unit RH NEG     XM INTEP COMP     Dispense Status TR     Blood Expiration Date March 16, 2024 23:59 EDT     PRODUCT BLOOD TYPE 1700     UNIT VOLUME 350     PRODUCT CODE A5420G68     Unit Number [id number]     Unit ABO B     Unit RH NEG     XM INTEP COMP     Dispense Status TR     Blood Expiration Date March 16, 2024 23:59 EDT     PRODUCT BLOOD TYPE 1700     UNIT VOLUME 350     PRODUCT CODE N7875R22     Unit Number [id number]     Unit ABO B     Unit RH NEG     XM INTEP COMP     Dispense Status TR     Blood Expiration Date March 16, 2024 23:59 EDT     PRODUCT BLOOD TYPE 1700     UNIT VOLUME 350     PRODUCT CODE G8843L24     Unit Number [id number]     Unit ABO B     Unit RH NEG     XM INTEP COMP     Dispense Status TR     Blood Expiration Date March 16, 2024 23:59 EDT     PRODUCT BLOOD TYPE 1700     UNIT VOLUME 284     PRODUCT CODE O3191A03     Unit Number [id number]     Unit ABO O     Unit RH POS     XM INTEP COMP     Dispense Status TR     Blood Expiration Date March 06, 2024 23:59 EST     PRODUCT BLOOD TYPE 5100     UNIT VOLUME 350     PRODUCT CODE X4169G16     Unit Number [id number]     Unit ABO O     Unit RH NEG     XM INTEP COMP     Dispense Status TR     Blood Expiration Date February 20, 2024 23:59 EST     PRODUCT BLOOD TYPE 9500     UNIT VOLUME 350     PRODUCT CODE S8675J09     Unit Number [id number]     Unit ABO O     Unit RH NEG     XM INTEP COMP     Dispense Status IS     Blood Expiration Date March 02, 2024 23:59 EST     PRODUCT BLOOD TYPE 9500     UNIT VOLUME 350     PRODUCT CODE L3405M08     Unit Number [id number]     Unit ABO O     Unit RH NEG     XM INTEP COMP     Dispense Status TR     Blood Expiration Date March 05, 2024 23:59 EST     PRODUCT BLOOD TYPE 9500     UNIT VOLUME 350     PRODUCT CODE M0098E20      Unit Number [id number]     Unit ABO O     Unit RH NEG     XM INTEP COMP     Dispense Status IS     Blood Expiration Date March 12, 2024 23:59 EDT     PRODUCT BLOOD TYPE 9500     UNIT VOLUME 350    Calcium, Ionized   Result Value Ref Range    POCT Calcium, Ionized 1.16 1.1 - 1.33 mmol/L   CBC   Result Value Ref Range    WBC 8.5 4.4 - 11.3 x10*3/uL    nRBC 5.7 (H) 0.0 - 0.0 /100 WBCs    RBC 3.52 (L) 4.50 - 5.90 x10*6/uL    Hemoglobin 10.5 (L) 13.5 - 17.5 g/dL    Hematocrit 29.8 (L) 41.0 - 52.0 %    MCV 85 80 - 100 fL    MCH 29.8 26.0 - 34.0 pg    MCHC 35.2 32.0 - 36.0 g/dL    RDW 17.7 (H) 11.5 - 14.5 %    Platelets 155 150 - 450 x10*3/uL   CBC and Auto Differential   Result Value Ref Range    WBC 11.0 4.4 - 11.3 x10*3/uL    nRBC 3.6 (H) 0.0 - 0.0 /100 WBCs    RBC 3.42 (L) 4.50 - 5.90 x10*6/uL    Hemoglobin 10.0 (L) 13.5 - 17.5 g/dL    Hematocrit 28.1 (L) 41.0 - 52.0 %    MCV 82 80 - 100 fL    MCH 29.2 26.0 - 34.0 pg    MCHC 35.6 32.0 - 36.0 g/dL    RDW 17.6 (H) 11.5 - 14.5 %    Platelets 165 150 - 450 x10*3/uL    Neutrophils % 67.1 40.0 - 80.0 %    Immature Granulocytes %, Automated 1.1 (H) 0.0 - 0.9 %    Lymphocytes % 15.7 13.0 - 44.0 %    Monocytes % 15.2 2.0 - 10.0 %    Eosinophils % 0.7 0.0 - 6.0 %    Basophils % 0.2 0.0 - 2.0 %    Neutrophils Absolute 7.36 1.20 - 7.70 x10*3/uL    Immature Granulocytes Absolute, Automated 0.12 0.00 - 0.70 x10*3/uL    Lymphocytes Absolute 1.72 1.20 - 4.80 x10*3/uL    Monocytes Absolute 1.67 (H) 0.10 - 1.00 x10*3/uL    Eosinophils Absolute 0.08 0.00 - 0.70 x10*3/uL    Basophils Absolute 0.02 0.00 - 0.10 x10*3/uL   Comprehensive Metabolic Panel   Result Value Ref Range    Glucose 95 74 - 99 mg/dL    Sodium 135 (L) 136 - 145 mmol/L    Potassium 4.2 3.5 - 5.3 mmol/L    Chloride 104 98 - 107 mmol/L    Bicarbonate 23 21 - 32 mmol/L    Anion Gap 12 10 - 20 mmol/L    Urea Nitrogen 7 6 - 23 mg/dL    Creatinine 0.64 0.50 - 1.30 mg/dL    eGFR >90 >60 mL/min/1.73m*2    Calcium 8.8 8.6  - 10.6 mg/dL    Albumin 3.5 3.4 - 5.0 g/dL    Alkaline Phosphatase 140 (H) 33 - 120 U/L    Total Protein 5.2 (L) 6.4 - 8.2 g/dL    AST 38 9 - 39 U/L    Bilirubin, Total 10.6 (H) 0.0 - 1.2 mg/dL    ALT 16 10 - 52 U/L   Lactate Dehydrogenase   Result Value Ref Range     (H) 84 - 246 U/L   Reticulocytes   Result Value Ref Range    Retic % 11.3 (H) 0.5 - 2.0 %    Retic Absolute 0.388 (H) 0.022 - 0.118 x10*6/uL    Reticulocyte Hemoglobin 28 28 - 38 pg    Immature Retic fraction 14.1 <=16.0 %   Vancomycin, Trough   Result Value Ref Range    Vancomycin, Trough 9.6 5.0 - 20.0 ug/mL                  Assessment/Plan   Principal Problem:    Sickle-cell disease with pain (CMS/HCC)  Active Problems:    Sickle cell crisis (CMS/HCC)    Joellen Narayan is a 23 y.o.Male PMH of newly diagnosed nodular lymphocyte predominant Hodgkins lymphoma (NLPHL) (on rituxan/prednisone, most recently received C2 2/8/24), 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 Of note, patient was just admitted for sickle cell pain management w/o complications (2/12-2/14). In ED, pt found to have mildly elevated temp 37.5 and leukocytosis (23.3k) higher than his baseline and atypical of his former sickle cell pain presentations. Of note, he was previously on prednisone for his lymphoma treatment (days 1-5) but last received on 2/13 and previous labs did not indicate such pronounced leukocytosis. CXR (2/16) w/o evidence of consolidation. COVID, Flu A/B (2/16) negative. Resp viral panel pending. Blood cultures x2 (2/16-NGTD). Started on Cefepime (2/16- current) and Vanc (2/16-current), s/p Flagly once in ED. Endorsed some RLQ abdominal pain in ED, and elevated D-dimer 6,363. CT PE  neg PE but showed L > R GGOs c/f infectious process vs acute chest syndrome. 2/17 Pt developed new O2 requirement to 3L, became febrile 38, had significant chest pain, and toxic ill appearance meeting criteria for ACS. Transfusion Med consulted for urgent RBC exchange and IR/MICU consulted for apheresis line placement, s/p line placement/ RBC exchange 2/17 evening. CT A/P w/o evidence of acute process. Started on dPCA (2/17-current) for pain mgmt. DC home pending improvement in pain, infectious process, respiratory status/hemodynamic stability.      # Hgb SS with severe pain  # Acute Chest Syndrome   - Follows in  sickle cell clinic, last seen on 1/23/24  - [place]AR[place] reviewed, no aberrant behavior noted   - Not on any disease modifying medications (per outpatient note 1/23/24, was sent oxybryta rx for DMT but denied by insurance, must trial endari first, sent rx for endari but patient has yet to start it)  - Hgb baseline ~8.5, Hgb 8.5 (2/16) --> post RBC exchange Hg 10 (2/18)   - Tbili baseline fluctuates ~5-13, Tbili 10.6 (2/18)  - LDH baseline fluctuates but ~500,  (2/18)  - No current care path  - Mildly elevated temp in ED of 37.5 and leukocytosis of 23.3k with left shift (2/16), baseline ~13 with previous sickle cell pain admissions --> (2/18) WBC 11  - s/p prednisone as part of chemo regimen on days 1-5 but last received 2/13 and labs did not indicate such pronounced leukocytosis at that time    - 2V CXR (2/16) w/o evidence of consolidation  - COVID, Flu A/B (2/16) negative  - Resp viral panel (2/16) pending  - s/p on admit started IV dilaudid 4mg q2hrs PRN severe pain (2/16-2/17); was writhing in pain and appeared in significant distress  - (2/17- current) c/w dPCA bolus 0.4mg, lockout 6 min, breakthrough RN dose 0.6mg, 1 hr limit 3mg, could likely wean tomorrow 2/19   - c/w IVP Toradol 30mg q6hrs x3 days (2/16-2/19) with Protonix for PPI prophy; monitor sCr closely as pt received IV Toradol during  recent hospital admission  - c/w home folic acid 1mg daily  - Hgb S (2/16) prelim 77.5% per lab, 2/14 Hg S 80.3%, post-exchange Hg S pending  - Utox (2/16) +MJ  - c/w PO Zofran PRN for opioid-induced nausea, PO Benadryl PRN for opioid-induced pruritus, and Bowel regimen for opioid-induced constipation with DocuSenna 2tabs BID, Miralax BID, Milk of Mag daily, Lactulose BID (LBM 2/15)   - 2/16 CT PE showed negative PE but L > R basilar GGOs w/ more consolidative opacity within LLL c/f c/f infectious process vs acute chest syndrome  - s/p Flagly once in ED, started Cefepime (2/16- current) and Vanc (2/16- current) - pt has ceftriaxone allergy on file but reports no issue/SEs with Cefepime, will cont and monitor for changes  - 2/17 AM Pt became febrile 38, developed new O2 requirement to 3L NC, reports chest pain, and has toxic ill appearance, pt briefly tachypneic 60s prior to PCA being started now improved 30s, reports feel worse than his ACS episode from last Feb 2023   - ACS is defined as radiographic evidence of consolidation plus fever > 38.5, New oxygen req, severe chest pain, Respiratory distress or new wheezing  - Both IR on-call attending and MICU consulted for urgent apheresis line placement, s/p apheresis line placement by IR in evening 2/17  - Transfusion Med consulted for urgent RBC exchange, s/p RBC exchange 2/17  - Blood cultures x2 (2/16- NGTD)  - Lactate level 1.7, Procal pending  - Noted some RLQ abdominal pain in ED, and elevated D-dimer 6,363  - 2/16 CT A/P w/ contrast ordered to r/o PE and intra-abdominal pathology, showed no acute abdominopelvic abnormality, cholelithiasis, extensive adenopathy improved from prior PET 12/2023   - Amylase 15, lipase 4     # Nodular lymphocyte predominant Hodgkins lymphoma (NLPHL)   - Primary Oncologist: Dr. Stoll-- emailed/updated on admit 2/16  - Enlarged lymph nodes noted 4/1/22  - RUQ US (11/14/22) with mildly enlarged LNs in the region of the kavin hepatis  -  MRI liver (11/16/22) showed re-demonstration of bulky retroperitoneal lymphadenopathy and kavin hepatic lymphadenopathy    - (11/18/22) lymph node biopsy showed atypical lymphoid infiltrate. Reviewed by Hemepath board, insufficient for lymphoma diagnosis  - PET/CT (12/6/22) showing retroperitoneal lymphadenopathy  - Followed up with Dr. Stoll (12/16/22) with plan for surg/onc consult for large tissue bx but patient missed apt and was lost to follow up  - Requested new apt with Dr. Ronnie Marte 6/19/23, patient was not seen and lost to follow up  - (11/28/23) CT A/P showed increased size of retroperitoneal lymph nodes reflecting extramedullary hematopoiesis I/s/o sickle cell vs lymphoma  - Paraaortic LN bx via IR (11/30/23) consistent with NLPHL. Flow: no clonal B cell or T cell population identified, lymphocyte 95%, CD3+CD4+ 68%, CD3+CD8+ 7%, CD19+ 24%  - Elevated LDH/bili partially from sickle cell disease   - Chemotherapy (R-[facility]OP) was discussed with primary oncologist Dr. Stoll, and decision was made to simplify his chemotherapy to Rituxan and prednisone q3 weeks mainly due to frequent sickle cell crisis  - Current chemo regimen: rituxan and prednisone q3 weeks (C1 1/18/24, C2 2/8/24, C3 due 2/29/24)  - 2/16 started Acyclovir 400mg BID prophy per Dr. Stoll     # Hx of nocturnal oxygen dependence and hypoxia on room air  - Historically improves with oxygen supplementation  - No O2 needs on admission, SpO2 was stable  - Has not had home oxygen for 2-3 years   - Pt did not qualify for home O2 per walking pulse ox performed during previous hospital admission on 2/14/24  - 2/17 Developed new 3L O2 requirement, could begin weaning if tolerates 2/18   - c/w continuous pulse ox   - Has pulm apt on 2/21/24     DVT prophy: Lovenox subcutaneous, SCDs, encourage ambulation     DISPO:  - Full Code  - DC home pending improvement in pain, infectious process, respiratory status/hemodynamic stability   - FUV Gastro NPV and Pulm  NPV on 2/21, Infusion for C3 ritux on 2/29. Sickle Cell FUV (Renee Barrera) on 3/20     I spent >75 minutes in the professional and overall care of this patient.     Assessment and plan discussed with attending physician Dr. James Ochoa.       Evelyne Matt, RAAD-CNP

## 2024-02-19 LAB
ABO GROUP (TYPE) IN BLOOD: NORMAL
ALBUMIN SERPL BCP-MCNC: 3.5 G/DL (ref 3.4–5)
ALP SERPL-CCNC: 155 U/L (ref 33–120)
ALT SERPL W P-5'-P-CCNC: 16 U/L (ref 10–52)
ANION GAP SERPL CALC-SCNC: 10 MMOL/L (ref 10–20)
ANTIBODY SCREEN: NORMAL
APPEARANCE UR: CLEAR
AST SERPL W P-5'-P-CCNC: 26 U/L (ref 9–39)
BASOPHILS # BLD AUTO: 0.02 X10*3/UL (ref 0–0.1)
BASOPHILS NFR BLD AUTO: 0.2 %
BILIRUB SERPL-MCNC: 7 MG/DL (ref 0–1.2)
BILIRUB UR STRIP.AUTO-MCNC: NEGATIVE MG/DL
BLOOD EXPIRATION DATE: NORMAL
BUN SERPL-MCNC: 6 MG/DL (ref 6–23)
CALCIUM SERPL-MCNC: 8.7 MG/DL (ref 8.6–10.6)
CHLORIDE SERPL-SCNC: 101 MMOL/L (ref 98–107)
CO2 SERPL-SCNC: 28 MMOL/L (ref 21–32)
COLOR UR: YELLOW
CREAT SERPL-MCNC: 0.48 MG/DL (ref 0.5–1.3)
DISPENSE STATUS: NORMAL
EGFRCR SERPLBLD CKD-EPI 2021: >90 ML/MIN/1.73M*2
EOSINOPHIL # BLD AUTO: 0.2 X10*3/UL (ref 0–0.7)
EOSINOPHIL NFR BLD AUTO: 1.6 %
ERYTHROCYTE [DISTWIDTH] IN BLOOD BY AUTOMATED COUNT: 17.8 % (ref 11.5–14.5)
GLUCOSE SERPL-MCNC: 92 MG/DL (ref 74–99)
GLUCOSE UR STRIP.AUTO-MCNC: NORMAL MG/DL
HCT VFR BLD AUTO: 27 % (ref 41–52)
HGB BLD-MCNC: 9.2 G/DL (ref 13.5–17.5)
HGB RETIC QN: 28 PG (ref 28–38)
IMM GRANULOCYTES # BLD AUTO: 0.11 X10*3/UL (ref 0–0.7)
IMM GRANULOCYTES NFR BLD AUTO: 0.9 % (ref 0–0.9)
IMMATURE RETIC FRACTION: 20.2 %
KETONES UR STRIP.AUTO-MCNC: NEGATIVE MG/DL
LDH SERPL L TO P-CCNC: 660 U/L (ref 84–246)
LEUKOCYTE ESTERASE UR QL STRIP.AUTO: NEGATIVE
LYMPHOCYTES # BLD AUTO: 1.98 X10*3/UL (ref 1.2–4.8)
LYMPHOCYTES NFR BLD AUTO: 16.3 %
MCH RBC QN AUTO: 28.8 PG (ref 26–34)
MCHC RBC AUTO-ENTMCNC: 34.1 G/DL (ref 32–36)
MCV RBC AUTO: 85 FL (ref 80–100)
MONOCYTES # BLD AUTO: 1.86 X10*3/UL (ref 0.1–1)
MONOCYTES NFR BLD AUTO: 15.3 %
NEUTROPHILS # BLD AUTO: 8 X10*3/UL (ref 1.2–7.7)
NEUTROPHILS NFR BLD AUTO: 65.7 %
NITRITE UR QL STRIP.AUTO: NEGATIVE
NRBC BLD-RTO: 1.5 /100 WBCS (ref 0–0)
PH UR STRIP.AUTO: 6 [PH]
PLATELET # BLD AUTO: 152 X10*3/UL (ref 150–450)
POTASSIUM SERPL-SCNC: 3.9 MMOL/L (ref 3.5–5.3)
PROCALCITONIN SERPL-MCNC: 0.82 NG/ML
PROCALCITONIN SERPL-MCNC: 0.85 NG/ML
PRODUCT BLOOD TYPE: 1700
PRODUCT BLOOD TYPE: 5100
PRODUCT BLOOD TYPE: 9500
PRODUCT CODE: NORMAL
PROT SERPL-MCNC: 5.5 G/DL (ref 6.4–8.2)
PROT UR STRIP.AUTO-MCNC: NEGATIVE MG/DL
RBC # BLD AUTO: 3.19 X10*6/UL (ref 4.5–5.9)
RBC # UR STRIP.AUTO: NEGATIVE /UL
RETICS #: 0.36 X10*6/UL (ref 0.02–0.12)
RETICS/RBC NFR AUTO: 11.4 % (ref 0.5–2)
RH FACTOR (ANTIGEN D): NORMAL
SODIUM SERPL-SCNC: 135 MMOL/L (ref 136–145)
SP GR UR STRIP.AUTO: 1.01
UNIT ABO: NORMAL
UNIT NUMBER: NORMAL
UNIT RH: NORMAL
UNIT VOLUME: 284
UNIT VOLUME: 350
UROBILINOGEN UR STRIP.AUTO-MCNC: NORMAL MG/DL
VANCOMYCIN TROUGH SERPL-MCNC: 10.5 UG/ML (ref 5–20)
WBC # BLD AUTO: 12.2 X10*3/UL (ref 4.4–11.3)
XM INTEP: NORMAL

## 2024-02-19 PROCEDURE — 2500000004 HC RX 250 GENERAL PHARMACY W/ HCPCS (ALT 636 FOR OP/ED): Performed by: NURSE PRACTITIONER

## 2024-02-19 PROCEDURE — 2500000004 HC RX 250 GENERAL PHARMACY W/ HCPCS (ALT 636 FOR OP/ED)

## 2024-02-19 PROCEDURE — 86901 BLOOD TYPING SEROLOGIC RH(D): CPT | Performed by: NURSE PRACTITIONER

## 2024-02-19 PROCEDURE — 84145 PROCALCITONIN (PCT): CPT

## 2024-02-19 PROCEDURE — 83615 LACTATE (LD) (LDH) ENZYME: CPT | Performed by: NURSE PRACTITIONER

## 2024-02-19 PROCEDURE — 87040 BLOOD CULTURE FOR BACTERIA: CPT | Performed by: PHYSICIAN ASSISTANT

## 2024-02-19 PROCEDURE — 85045 AUTOMATED RETICULOCYTE COUNT: CPT | Performed by: NURSE PRACTITIONER

## 2024-02-19 PROCEDURE — 2500000001 HC RX 250 WO HCPCS SELF ADMINISTERED DRUGS (ALT 637 FOR MEDICARE OP)

## 2024-02-19 PROCEDURE — 80053 COMPREHEN METABOLIC PANEL: CPT | Performed by: NURSE PRACTITIONER

## 2024-02-19 PROCEDURE — 99233 SBSQ HOSP IP/OBS HIGH 50: CPT | Performed by: NURSE PRACTITIONER

## 2024-02-19 PROCEDURE — 84145 PROCALCITONIN (PCT): CPT | Performed by: NURSE PRACTITIONER

## 2024-02-19 PROCEDURE — 80202 ASSAY OF VANCOMYCIN: CPT | Performed by: INTERNAL MEDICINE

## 2024-02-19 PROCEDURE — 2500000001 HC RX 250 WO HCPCS SELF ADMINISTERED DRUGS (ALT 637 FOR MEDICARE OP): Performed by: NURSE PRACTITIONER

## 2024-02-19 PROCEDURE — 36415 COLL VENOUS BLD VENIPUNCTURE: CPT | Performed by: PHYSICIAN ASSISTANT

## 2024-02-19 PROCEDURE — 1170000001 HC PRIVATE ONCOLOGY ROOM DAILY

## 2024-02-19 PROCEDURE — 81003 URINALYSIS AUTO W/O SCOPE: CPT | Performed by: PHYSICIAN ASSISTANT

## 2024-02-19 PROCEDURE — 2500000002 HC RX 250 W HCPCS SELF ADMINISTERED DRUGS (ALT 637 FOR MEDICARE OP, ALT 636 FOR OP/ED): Performed by: NURSE PRACTITIONER

## 2024-02-19 PROCEDURE — RXMED WILLOW AMBULATORY MEDICATION CHARGE

## 2024-02-19 PROCEDURE — A4217 STERILE WATER/SALINE, 500 ML: HCPCS | Performed by: INTERNAL MEDICINE

## 2024-02-19 PROCEDURE — 85025 COMPLETE CBC W/AUTO DIFF WBC: CPT | Performed by: NURSE PRACTITIONER

## 2024-02-19 PROCEDURE — 2500000004 HC RX 250 GENERAL PHARMACY W/ HCPCS (ALT 636 FOR OP/ED): Performed by: INTERNAL MEDICINE

## 2024-02-19 RX ORDER — OXYCODONE HYDROCHLORIDE 5 MG/1
10 TABLET ORAL ONCE
Status: COMPLETED | OUTPATIENT
Start: 2024-02-19 | End: 2024-02-19

## 2024-02-19 RX ORDER — ONDANSETRON HYDROCHLORIDE 2 MG/ML
4 INJECTION, SOLUTION INTRAVENOUS ONCE
Status: COMPLETED | OUTPATIENT
Start: 2024-02-19 | End: 2024-02-19

## 2024-02-19 RX ORDER — LEVOFLOXACIN 750 MG/1
750 TABLET ORAL
Status: DISCONTINUED | OUTPATIENT
Start: 2024-02-19 | End: 2024-02-21

## 2024-02-19 RX ORDER — LACTULOSE 10 G/15ML
20 SOLUTION ORAL 2 TIMES DAILY PRN
Status: DISCONTINUED | OUTPATIENT
Start: 2024-02-19 | End: 2024-02-28 | Stop reason: HOSPADM

## 2024-02-19 RX ORDER — POLYETHYLENE GLYCOL 3350 17 G/17G
17 POWDER, FOR SOLUTION ORAL DAILY
Status: DISCONTINUED | OUTPATIENT
Start: 2024-02-20 | End: 2024-02-28 | Stop reason: HOSPADM

## 2024-02-19 RX ORDER — ACETAMINOPHEN 325 MG/1
975 TABLET ORAL ONCE
Status: COMPLETED | OUTPATIENT
Start: 2024-02-19 | End: 2024-02-19

## 2024-02-19 RX ORDER — HYDROMORPHONE HYDROCHLORIDE 1 MG/ML
0.6 INJECTION, SOLUTION INTRAMUSCULAR; INTRAVENOUS; SUBCUTANEOUS ONCE
Status: DISCONTINUED | OUTPATIENT
Start: 2024-02-19 | End: 2024-02-23

## 2024-02-19 RX ADMIN — VANCOMYCIN HYDROCHLORIDE 1250 MG: 5 INJECTION, POWDER, LYOPHILIZED, FOR SOLUTION INTRAVENOUS at 01:30

## 2024-02-19 RX ADMIN — ACYCLOVIR 400 MG: 400 TABLET ORAL at 08:21

## 2024-02-19 RX ADMIN — GABAPENTIN 300 MG: 300 CAPSULE ORAL at 14:25

## 2024-02-19 RX ADMIN — HYDROMORPHONE HYDROCHLORIDE 3 MG: 2 INJECTION, SOLUTION INTRAMUSCULAR; INTRAVENOUS; SUBCUTANEOUS at 10:22

## 2024-02-19 RX ADMIN — SENNOSIDES AND DOCUSATE SODIUM 2 TABLET: 8.6; 5 TABLET ORAL at 20:35

## 2024-02-19 RX ADMIN — FOLIC ACID 1 MG: 1 TABLET ORAL at 08:21

## 2024-02-19 RX ADMIN — PANTOPRAZOLE SODIUM 40 MG: 40 TABLET, DELAYED RELEASE ORAL at 06:41

## 2024-02-19 RX ADMIN — HYDROMORPHONE HYDROCHLORIDE 3 MG: 2 INJECTION, SOLUTION INTRAMUSCULAR; INTRAVENOUS; SUBCUTANEOUS at 17:43

## 2024-02-19 RX ADMIN — KETOROLAC TROMETHAMINE 30 MG: 30 INJECTION, SOLUTION INTRAMUSCULAR; INTRAVENOUS at 00:20

## 2024-02-19 RX ADMIN — KETOROLAC TROMETHAMINE 30 MG: 30 INJECTION, SOLUTION INTRAMUSCULAR; INTRAVENOUS at 05:33

## 2024-02-19 RX ADMIN — ONDANSETRON 4 MG: 2 INJECTION INTRAMUSCULAR; INTRAVENOUS at 08:31

## 2024-02-19 RX ADMIN — GABAPENTIN 300 MG: 300 CAPSULE ORAL at 22:59

## 2024-02-19 RX ADMIN — ACETAMINOPHEN 975 MG: 325 TABLET ORAL at 22:15

## 2024-02-19 RX ADMIN — HYDROMORPHONE HYDROCHLORIDE 2 MG: 2 INJECTION, SOLUTION INTRAMUSCULAR; INTRAVENOUS; SUBCUTANEOUS at 14:20

## 2024-02-19 RX ADMIN — ENOXAPARIN SODIUM 40 MG: 100 INJECTION SUBCUTANEOUS at 22:59

## 2024-02-19 RX ADMIN — KETOROLAC TROMETHAMINE 30 MG: 30 INJECTION, SOLUTION INTRAMUSCULAR; INTRAVENOUS at 12:31

## 2024-02-19 RX ADMIN — ACYCLOVIR 400 MG: 400 TABLET ORAL at 20:35

## 2024-02-19 RX ADMIN — OXYCODONE HYDROCHLORIDE 10 MG: 5 TABLET ORAL at 02:11

## 2024-02-19 RX ADMIN — HYDROMORPHONE HYDROCHLORIDE 3 MG: 2 INJECTION, SOLUTION INTRAMUSCULAR; INTRAVENOUS; SUBCUTANEOUS at 12:30

## 2024-02-19 RX ADMIN — DICLOFENAC SODIUM TOPICAL GEL, 1% 4 G: 10 GEL TOPICAL at 07:00

## 2024-02-19 RX ADMIN — LACTULOSE 20 G: 20 SOLUTION ORAL at 17:43

## 2024-02-19 RX ADMIN — SENNOSIDES AND DOCUSATE SODIUM 2 TABLET: 8.6; 5 TABLET ORAL at 08:22

## 2024-02-19 RX ADMIN — HYDROMORPHONE HYDROCHLORIDE 4 MG: 2 INJECTION, SOLUTION INTRAMUSCULAR; INTRAVENOUS; SUBCUTANEOUS at 22:13

## 2024-02-19 RX ADMIN — HYDROMORPHONE HYDROCHLORIDE 4 MG: 2 INJECTION, SOLUTION INTRAMUSCULAR; INTRAVENOUS; SUBCUTANEOUS at 20:16

## 2024-02-19 RX ADMIN — HYDROMORPHONE HYDROCHLORIDE 2 MG: 2 INJECTION, SOLUTION INTRAMUSCULAR; INTRAVENOUS; SUBCUTANEOUS at 18:38

## 2024-02-19 RX ADMIN — DICLOFENAC SODIUM TOPICAL GEL, 1% 4 G: 10 GEL TOPICAL at 21:00

## 2024-02-19 RX ADMIN — LEVOFLOXACIN 750 MG: 750 TABLET, FILM COATED ORAL at 08:21

## 2024-02-19 RX ADMIN — LACTULOSE 20 G: 20 SOLUTION ORAL at 22:59

## 2024-02-19 RX ADMIN — ALLOPURINOL 300 MG: 300 TABLET ORAL at 08:22

## 2024-02-19 RX ADMIN — HYDROMORPHONE HYDROCHLORIDE 3 MG: 2 INJECTION, SOLUTION INTRAMUSCULAR; INTRAVENOUS; SUBCUTANEOUS at 15:35

## 2024-02-19 RX ADMIN — Medication: at 03:10

## 2024-02-19 RX ADMIN — GABAPENTIN 300 MG: 300 CAPSULE ORAL at 05:33

## 2024-02-19 ASSESSMENT — COGNITIVE AND FUNCTIONAL STATUS - GENERAL
DAILY ACTIVITIY SCORE: 24
MOBILITY SCORE: 23
CLIMB 3 TO 5 STEPS WITH RAILING: A LITTLE

## 2024-02-19 ASSESSMENT — PAIN SCALES - GENERAL
PAINLEVEL_OUTOF10: 8
PAINLEVEL_OUTOF10: 9
PAINLEVEL_OUTOF10: 8
PAINLEVEL_OUTOF10: 10 - WORST POSSIBLE PAIN
PAINLEVEL_OUTOF10: 9
PAINLEVEL_OUTOF10: 9
PAINLEVEL_OUTOF10: 10 - WORST POSSIBLE PAIN
PAINLEVEL_OUTOF10: 6
PAINLEVEL_OUTOF10: 9
PAINLEVEL_OUTOF10: 8
PAINLEVEL_OUTOF10: 8
PAINLEVEL_OUTOF10: 9
PAINLEVEL_OUTOF10: 10 - WORST POSSIBLE PAIN
PAINLEVEL_OUTOF10: 8

## 2024-02-19 ASSESSMENT — PAIN - FUNCTIONAL ASSESSMENT
PAIN_FUNCTIONAL_ASSESSMENT: 0-10

## 2024-02-19 ASSESSMENT — PAIN DESCRIPTION - LOCATION
LOCATION: LEG
LOCATION: CHEST
LOCATION: LEG
LOCATION: CHEST

## 2024-02-19 ASSESSMENT — PAIN DESCRIPTION - ORIENTATION: ORIENTATION: LEFT

## 2024-02-19 NOTE — NURSING NOTE
Apheresis Nursing Note    Joellen Narayan is a 23 y.o. male presenting for Acute Chest syndrome.    Pre-Procedure Assessment  Pre-Procedure Assessment  Level of Consciousness: Alert (sleepy but responsive)  Orientation Level: Oriented X4  Cognition: Appropriate judgement, Appropriate safety awareness, Appropriate attention/concentration, Appropriate for developmental age, Follows commands  Access Sites 'Okay to Use' Obtained: Yes  Access Site(s) Assessment: Yes  Estimated Replacement Volume: 2600  Vital Signs  Temp: 39.4°C  Heart Rate: 101  Resp: 24  BP: 175/82  Medical Gas Therapy  SpO2: 100 %  Pulse Oximetry Type: Intermittent  Oximetry Probe Site Location: Finger  Patient Activity During SpO2 Measurement: At rest  Medical Gas Therapy: Supplemental oxygen (3L)  O2 Delivery Method: Nasal cannula  Procedure Information and Time Out  Procedure Type: Red blood cell exchange  Red Cell Diagnosis: Acute chest syndrome  Target Hemoglobin S (HgB S) (g/dL): 28 g/dL  Target Hematocrit (HCT) %: 28 %  Target Fraction of Cells Remaining (FCR): 33  Units Used: 8  Volume Processed (L):  (n/a)  Fluid Balance: 100%  Kit and Blood/Fluid Warmer Inspection: Tubing inspected with machine prime  RBC Exchange Time-Out Completed: Yes  Provider Time Out Completed with: Dr. Velasco  Time Out Completed on: 02/17/24  Time Out Completed at: 5639  Equipment and Disposables  Apheresis Machine: Spectra Optia [id number]  Apheresis Machine PM in Date: Yes  Blood Warmer: Astotherm DNA [id number]  Blood Warmer PM in Date: Yes  Kit and Blood/Fluid Warmer Inspection: Tubing inspected prior to connection to patient  Kit Type: Exchange kits  Kit Lot Number: [id number]  Kit Expiration Date: 10/01/25  ADC-A Used as Anticoagulant: Yes  ACD-A Lot #: [id number]  ACD-A Expiration Date: 09/01/25  9% Sodium Chloride Lot # (Liter): [id number]  9% Sodium Chloride Expiration Date (Liter): 05/31/25  Procedure Start  Start Time: 2240    Post-Procedure  Assessment:  Post-Procedure  End Time: 0015  Waste Materials Collected for Research: No  Procedure Outcome: Treatment completed successfully  Adverse Event: No  Post-Procedure Line Assessment: Patent  Post-Procedure Access Care : Loaded/flushed per order, 'Do Not Flush' label applied, Caps changed  Addtional Comments/Procedure Details: Post-report and vitals called to Dr. Velasco  Vital Signs  Temp: 37.5 °C  Heart Rate: 102  Resp: 24  BP: 158/92  Medical Gas Therapy  SpO2: 97  Pulse Oximetry Type: Intermittent  Oximetry Probe Site Location: Finger  Patient Activity During SpO2 Measurement: At rest  Medical Gas Therapy: Supplemental oxygen (3L)  O2 Delivery Method: Nasal cannula   Post-Procedure Patient Fluid Values   Replacement Fluid Intake (mL): 2567 mL  AC Infused (mL): 172 mL  Rinseback Intake (mL): 0 mL  Ca+ Solution Intake (mL): 0 mL  Other Intake (mL): 0 mL  Apheresis Intake Total (mL): 2739 mL  Removed During Apheresis Output (mL): 2926 mL  AC in Bag Output (mL): 186 mL  Samples Output (mL): 20 mL  Apheresis Output Total (mL): 2760 mL  Inlet Volume Processed (mL): 4709 mL  Net Difference (mL): -21 mL  Disposition  Patient's Condition at End of Procedure: Stable  Disposition: N/A - procedure performed at bedside  Transferred via:  (n/a)  Report Given to: Floor Nurse (Mile)  $ Apheresis Charge: Red blood cell exchange    Ramiro Suarez RN

## 2024-02-19 NOTE — CONSULTS
Vancomycin Dosing by Pharmacy- Cessation of Therapy    Consult to pharmacy for vancomycin dosing has been discontinued by the prescriber, pharmacy will sign off at this time.    Please call pharmacy if there are further questions or re-enter a consult if vancomycin is resumed.     Juan NaiduD

## 2024-02-19 NOTE — PROGRESS NOTES
02/19/24 1447   Discharge Planning   Living Arrangements Parent   Support Systems Parent   Assistance Needed Patient was independent, does not use assistive devices.   Type of Residence Private residence   Number of Stairs to Enter Residence 5   Number of Stairs Within Residence 12   Do you have animals or pets at home? No   Who is requesting discharge planning? Provider   Patient expects to be discharged to: Home no needs   Does the patient need discharge transport arranged? Yes   RoundTrip coordination needed? No   Has discharge transport been arranged? Yes     Patient lives with his mother in a house with upper level, patient is able to walk stairs without difficulty. Patient denies falls, feels safe at home. Demographics and contacts verified. Will contue to assist with discharge neesds. Tricia Sawyer RN, TCC

## 2024-02-19 NOTE — CARE PLAN
Problem: Pain  Goal: My pain/discomfort is manageable  Outcome: Progressing     Problem: Safety  Goal: Patient will be injury free during hospitalization  Outcome: Progressing  Goal: I will remain free of falls  Outcome: Progressing     Problem: Daily Care  Goal: Daily care needs are met  Outcome: Progressing     Problem: Psychosocial Needs  Goal: Demonstrates ability to cope with hospitalization/illness  Outcome: Progressing  Goal: Collaborate with me, my family, and caregiver to identify my specific goals  Outcome: Progressing     Problem: Discharge Barriers  Goal: My discharge needs are met  Outcome: Progressing     Throughout the duration of my shift he continued to rate his pain 7/10-10/10 pain, he also reported adverse reactions to cefepime. Will continue to monitor

## 2024-02-19 NOTE — PROGRESS NOTES
Music Therapy Note    Joellen Narayan     Therapy Session  Referral Type: Pain rounds  Visit Type: New visit  Session Start Time: 1414  Session End Time: 1430  Intervention Delivery: In-person  Conflict of Service: None  Family Present for Session: None  Number of staff members present: 1     Pre-assessment  Unable to Assess Reason: Outcomes not assessed  Mood/Affect: Appropriate, Cooperative         Treatment/Interventions  Music Therapy Interventions: Assessment  Interruption: No  Patient Fell Asleep at End of Session: No    Post-assessment  Unable to Assess Reason: Did not provide expressive therapy intervention  Mood/Affect: Appropriate, Cooperative  Continue Visiting: Yes  Total Session Time (min): 16 minutes    Narrative  Assessment Detail: Patient presented awake and alert, ambulating to his bed from the bathroom, appearing uncomfortable. Patient voiced recognition of this MT from a previous admission and welcomed MT into the room. Pt's bedside RN Katherine was present for much of session. Patient expressed interest in learning more about what a music therapy session can entail, and MT provided examples of both passive and active interventions to meet pt where he is at. Patient declined a music intervention this day d/t severe pain and expressed interest in more active interventions, such as therapeutic instrument lessons (pt especially interested in guitar and bass guitar). Patient enjoys listening to blues artists such as JIAN Curiel. Patient requested MT return either next day or at his next infusion appointment.  Follow-up: MT to continue to follow.    Education Documentation  No documentation found.

## 2024-02-19 NOTE — CARE PLAN
Problem: Pain  Goal: My pain/discomfort is manageable  Outcome: Progressing     Problem: Safety  Goal: Patient will be injury free during hospitalization  Outcome: Progressing  Goal: I will remain free of falls  Outcome: Progressing     Problem: Daily Care  Goal: Daily care needs are met  Outcome: Progressing     Problem: Psychosocial Needs  Goal: Demonstrates ability to cope with hospitalization/illness  Outcome: Progressing  Goal: Collaborate with me, my family, and caregiver to identify my specific goals  Outcome: Progressing     Problem: Discharge Barriers  Goal: My discharge needs are met  Outcome: Progressing   The patient's goals for the shift include  pain control    The clinical goals for the shift include Pt spencer remain safe in room and use call light during shift on 2/18/21 @1930    Over the shift, the patient did not make progress toward the following goals. Barriers to progression include PCA ineffective. Recommendations to address these barriers include adjust pain med route,frequent pain assessments.

## 2024-02-19 NOTE — PROGRESS NOTES
"Joellen Narayan is a 23 y.o. male on day 2 of admission presenting with Sickle-cell disease with pain (CMS/HCC).    Subjective   Seen this AM at the bedside. Feels as if his pain is improving but only slowly. Wants to get off of PCA and back to IV push dilaudid because he feels it was more helpful. He also had some hallucinations over night, we discussed stopping cefepime as he has a ceftriaxone allergy and there is cross-sensitivity. He otherwise denies any fevers/chills, SOB, or CP.    Objective     Physical Exam  HENT:      Head: Normocephalic.      Right Ear: External ear normal.      Left Ear: External ear normal.      Nose: Nose normal.   Eyes:      General: Scleral icterus present.      Extraocular Movements: Extraocular movements intact.      Pupils: Pupils are equal, round, and reactive to light.   Cardiovascular:      Rate and Rhythm: Regular rhythm.   Pulmonary:      Comments: Diminished throughout all lung fields  Abdominal:      General: Abdomen is flat. Bowel sounds are normal.      Palpations: Abdomen is soft.   Musculoskeletal:      Cervical back: Normal range of motion and neck supple.      Comments: Generalized weakness   Skin:     General: Skin is warm and dry.   Neurological:      General: No focal deficit present.      Mental Status: He is alert and oriented to person, place, and time. Mental status is at baseline.   Psychiatric:         Mood and Affect: Mood normal.         Behavior: Behavior normal.         Thought Content: Thought content normal.         Judgment: Judgment normal.         Last Recorded Vitals  Blood pressure 132/76, pulse 90, temperature 36.3 °C (97.3 °F), resp. rate 20, height 1.854 m (6' 1\"), weight 72.6 kg (160 lb), SpO2 98 %.  Intake/Output last 3 Shifts:  I/O last 3 completed shifts:  In: 6260.3 (86.3 mL/kg) [P.O.:950; I.V.:1771.3 (24.4 mL/kg); Other:2739; IV Piggyback:800]  Out: 6235 (85.9 mL/kg) [Urine:3475 (1.3 mL/kg/hr); Other:2760]  Weight: 72.6 kg     Relevant " Results        This patient has a central line   Reason for the central line remaining today?  Blood draws, infusions     .Scheduled medications  acyclovir, 400 mg, oral, q12h REYNALDO  allopurinol, 300 mg, oral, Daily  diclofenac sodium, 4 g, Topical, 4x daily  enoxaparin, 40 mg, subcutaneous, Daily  folic acid, 1 mg, oral, Daily  gabapentin, 300 mg, oral, q8h REYNALDO  HYDROmorphone, 0.6 mg, intravenous, Once  ketorolac, 30 mg, intravenous, q6h REYNALDO  levoFLOXacin, 750 mg, oral, q24h REYNALDO  lidocaine, 1 patch, transdermal, Daily  pantoprazole, 40 mg, oral, Daily before breakfast  [START ON 2/20/2024] polyethylene glycol, 17 g, oral, Daily  sennosides-docusate sodium, 2 tablet, oral, BID      Continuous medications       PRN medications  PRN medications: acetaminophen, albuterol, diphenhydrAMINE, HYDROmorphone, lactulose, naloxone, ondansetron    .  Results for orders placed or performed during the hospital encounter of 02/16/24 (from the past 24 hour(s))   CBC and Auto Differential   Result Value Ref Range    WBC 12.2 (H) 4.4 - 11.3 x10*3/uL    nRBC 1.5 (H) 0.0 - 0.0 /100 WBCs    RBC 3.19 (L) 4.50 - 5.90 x10*6/uL    Hemoglobin 9.2 (L) 13.5 - 17.5 g/dL    Hematocrit 27.0 (L) 41.0 - 52.0 %    MCV 85 80 - 100 fL    MCH 28.8 26.0 - 34.0 pg    MCHC 34.1 32.0 - 36.0 g/dL    RDW 17.8 (H) 11.5 - 14.5 %    Platelets 152 150 - 450 x10*3/uL    Neutrophils % 65.7 40.0 - 80.0 %    Immature Granulocytes %, Automated 0.9 0.0 - 0.9 %    Lymphocytes % 16.3 13.0 - 44.0 %    Monocytes % 15.3 2.0 - 10.0 %    Eosinophils % 1.6 0.0 - 6.0 %    Basophils % 0.2 0.0 - 2.0 %    Neutrophils Absolute 8.00 (H) 1.20 - 7.70 x10*3/uL    Immature Granulocytes Absolute, Automated 0.11 0.00 - 0.70 x10*3/uL    Lymphocytes Absolute 1.98 1.20 - 4.80 x10*3/uL    Monocytes Absolute 1.86 (H) 0.10 - 1.00 x10*3/uL    Eosinophils Absolute 0.20 0.00 - 0.70 x10*3/uL    Basophils Absolute 0.02 0.00 - 0.10 x10*3/uL   Comprehensive Metabolic Panel   Result Value Ref Range     Glucose 92 74 - 99 mg/dL    Sodium 135 (L) 136 - 145 mmol/L    Potassium 3.9 3.5 - 5.3 mmol/L    Chloride 101 98 - 107 mmol/L    Bicarbonate 28 21 - 32 mmol/L    Anion Gap 10 10 - 20 mmol/L    Urea Nitrogen 6 6 - 23 mg/dL    Creatinine 0.48 (L) 0.50 - 1.30 mg/dL    eGFR >90 >60 mL/min/1.73m*2    Calcium 8.7 8.6 - 10.6 mg/dL    Albumin 3.5 3.4 - 5.0 g/dL    Alkaline Phosphatase 155 (H) 33 - 120 U/L    Total Protein 5.5 (L) 6.4 - 8.2 g/dL    AST 26 9 - 39 U/L    Bilirubin, Total 7.0 (H) 0.0 - 1.2 mg/dL    ALT 16 10 - 52 U/L   Lactate Dehydrogenase   Result Value Ref Range     (H) 84 - 246 U/L   Reticulocytes   Result Value Ref Range    Retic % 11.4 (H) 0.5 - 2.0 %    Retic Absolute 0.365 (H) 0.022 - 0.118 x10*6/uL    Reticulocyte Hemoglobin 28 28 - 38 pg    Immature Retic fraction 20.2 (H) <=16.0 %   Vancomycin, Trough   Result Value Ref Range    Vancomycin, Trough 10.5 5.0 - 20.0 ug/mL   Procalcitonin   Result Value Ref Range    Procalcitonin 0.82 (H) <=0.07 ng/mL       Assessment/Plan   Principal Problem:    Sickle-cell disease with pain (CMS/HCC)  Active Problems:    Sickle cell crisis (CMS/HCC)    Joellen Narayan is a 23 y.o.Male PMH of newly diagnosed nodular lymphocyte predominant Hodgkins lymphoma (NLPHL) (on rituxan/prednisone, most recently received C2 2/8/24), 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 Of note, patient was just admitted for sickle cell pain management w/o complications (2/12-2/14). In ED, pt found to have mildly elevated temp 37.5 and leukocytosis (23.3k) higher than his baseline and atypical of his former sickle cell pain presentations. Of note, he was previously on prednisone for his lymphoma treatment (days  1-5) but last received on 2/13 and previous labs did not indicate such pronounced leukocytosis. CXR (2/16) w/o evidence of consolidation. COVID, Flu A/B (2/16) negative. Resp viral panel pending. Blood cultures x2 (2/16-NGTD). Started on Cefepime (2/16-2/19) and Vanc (2/16-2/19), s/p Flagly once in ED. Endorsed some RLQ abdominal pain in ED, and elevated D-dimer 6,363. CT PE neg PE but showed L > R GGOs c/f infectious process vs acute chest syndrome. 2/17 Pt developed new O2 requirement to 3L, became febrile 38, had significant chest pain, and toxic ill appearance meeting criteria for ACS. Transfusion Med consulted for urgent RBC exchange and IR consulted for apheresis line placement, s/p line placement/ RBC exchange 2/17 evening. CT A/P w/o evidence of acute process. 2/19 stopped IV atbs d/t hallucinations with cefepime (allergy to ceftriaxone and cross-sensitivity), started Levaqin (2/19-). S/p dPCA (2/17-2/19), transitioned to IVP dilaudid 2/19. DC home pending improvement in pain, infectious process, respiratory status/hemodynamic stability.      # Hgb SS with severe pain  # Acute Chest Syndrome   - Follows in  sickle cell clinic, last seen on 1/23/24  - [place] reviewed, no aberrant behavior noted   - Not on any disease modifying medications (per outpatient note 1/23/24, was sent oxybryta rx for DMT but denied by insurance, must trial endari first, sent rx for endari but patient has yet to start it)  - Hgb baseline ~8.5, Hgb 8.5 (2/16) --> post RBC exchange Hgb 10 (2/18)   - Tbili baseline fluctuates ~5-13, Tbili 10.6 (2/18)--> 7 (2/19)  - LDH baseline fluctuates but ~500,  (2/18)--> 660 (2/19)  - No current care path  - Mildly elevated temp in ED of 37.5 and leukocytosis of 23.3k with left shift (2/16), baseline ~13 with previous sickle cell pain admissions --> (2/18) WBC 11  - s/p prednisone as part of chemo regimen on days 1-5 but last received 2/13 and labs did not indicate such pronounced  leukocytosis at that time   - 2V CXR (2/16) w/o evidence of consolidation  - COVID, Flu A/B (2/16) negative  - Resp viral panel (2/16) pending  - s/p on admit started IV dilaudid 4mg q2hrs PRN severe pain (2/16-2/17); was writhing in pain and appeared in significant distress  - (2/17-2/19) s/p dPCA  - c/w IVP Toradol 30mg q6hrs x3 days (2/16-2/19) with Protonix for PPI prophy; monitor sCr closely as pt received IV Toradol during recent hospital admission  - Continue home folic acid 1mg daily  - Hgb S (2/16) prelim 77.5% per lab, 2/14 Hg S 80.3%, post-exchange Hgb S (2/19) 25.2%  - Utox (2/16) +MJ  -  PO Zofran PRN for opioid-induced nausea, PO Benadryl PRN for opioid-induced pruritus  - Bowel regimen for opioid-induced constipation with DocuSenna 2tabs BID, Miralax daily, Lactulose BID PRN; LBM 2/19  - 2/16 CT PE showed negative PE but L > R basilar GGOs w/ more consolidative opacity within LLL c/f c/f infectious process vs acute chest syndrome  - s/p Flagly once in ED, started Cefepime (2/16- current) and Vanc (2/16- current) - pt has ceftriaxone allergy on file but reports no issue/SEs with Cefepime, will cont and monitor for changes  - 2/17 AM Pt became febrile 38, developed new O2 requirement to 3L NC, reports chest pain, and has toxic ill appearance, pt briefly tachypneic 60s prior to PCA being started now improved, reports feel worse than his ACS episode from last Feb 2023   - ACS is defined as radiographic evidence of consolidation plus fever > 38.5, New oxygen req, severe chest pain, Respiratory distress or new wheezing  - Both IR on-call attending and MICU consulted for urgent apheresis line placement, s/p apheresis line placement by IR in evening 2/17  - Transfusion Med consulted for urgent RBC exchange, s/p RBC exchange 2/17  - Blood cultures x2 (2/16- NGTD)  - Lactate level 1.7, Procal 0.82 (2/19)  - Noted some RLQ abdominal pain in ED, and elevated D-dimer 6,363  - 2/16 CT A/P w/ contrast ordered to r/o  PE and intra-abdominal pathology, showed no acute abdominopelvic abnormality, cholelithiasis, extensive adenopathy improved from prior PET 12/2023   - Amylase 15, lipase 4     # Nodular lymphocyte predominant Hodgkins lymphoma (NLPHL)   - Primary Oncologist: Dr. Stoll-- emailed/updated on admit 2/16  - Enlarged lymph nodes noted 4/1/22  - RUQ US (11/14/22) with mildly enlarged LNs in the region of the kavin hepatis  - MRI liver (11/16/22) showed re-demonstration of bulky retroperitoneal lymphadenopathy and kavin hepatic lymphadenopathy    - (11/18/22) lymph node biopsy showed atypical lymphoid infiltrate. Reviewed by Hemepath board, insufficient for lymphoma diagnosis  - PET/CT (12/6/22) showing retroperitoneal lymphadenopathy  - Followed up with Dr. Stoll (12/16/22) with plan for surg/onc consult for large tissue bx but patient missed apt and was lost to follow up  - Requested new apt with Dr. Ronnie Marte 6/19/23, patient was not seen and lost to follow up  - (11/28/23) CT A/P showed increased size of retroperitoneal lymph nodes reflecting extramedullary hematopoiesis I/s/o sickle cell vs lymphoma  - Paraaortic LN bx via IR (11/30/23) consistent with NLPHL. Flow: no clonal B cell or T cell population identified, lymphocyte 95%, CD3+CD4+ 68%, CD3+CD8+ 7%, CD19+ 24%  - Elevated LDH/bili partially from sickle cell disease   - Chemotherapy (R-[facility]OP) was discussed with primary oncologist Dr. Stoll, and decision was made to simplify his chemotherapy to Rituxan and prednisone q3 weeks mainly due to frequent sickle cell crisis  - Current chemo regimen: rituxan and prednisone q3 weeks (C1 1/18/24, C2 2/8/24, C3 due 2/29/24)  - 2/16 started Acyclovir 400mg BID prophy per Dr. Stoll     # Hx of nocturnal oxygen dependence and hypoxia on room air  - Historically improves with oxygen supplementation  - No O2 needs on admission, SpO2 was stable  - Has not had home oxygen for 2-3 years   - Pt did not qualify for home O2 per  walking pulse ox performed during previous hospital admission on 2/14/24  - 2/17 Developed new 3L O2 requirement, could begin weaning if tolerates 2/18   - c/w continuous pulse ox   - Has pulm apt on 2/21/24     DVT prophy: Lovenox subcutaneous, SCDs, encourage ambulation     DISPO:  - Full Code  - DC home pending improvement in pain, infectious process, respiratory status/hemodynamic stability   - FUV Gastro NPV and Pulm NPV on 2/21, Infusion for C3 ritux on 2/29. Sickle Cell FUV (Renee Barrera) on 3/20     I spent >60 minutes in the professional and overall care of this patient.     Assessment and plan discussed with attending physician Dr. Alex Jenkins, APRN-CNP

## 2024-02-20 ENCOUNTER — APPOINTMENT (OUTPATIENT)
Dept: RADIOLOGY | Facility: HOSPITAL | Age: 24
DRG: 811 | End: 2024-02-20
Payer: COMMERCIAL

## 2024-02-20 LAB
ADENOVIRUS RVP, VIRC: NOT DETECTED
ALBUMIN SERPL BCP-MCNC: 3.7 G/DL (ref 3.4–5)
ALP SERPL-CCNC: 209 U/L (ref 33–120)
ALT SERPL W P-5'-P-CCNC: 37 U/L (ref 10–52)
ANION GAP SERPL CALC-SCNC: 12 MMOL/L (ref 10–20)
AST SERPL W P-5'-P-CCNC: 68 U/L (ref 9–39)
BACTERIA BLD CULT: NORMAL
BACTERIA BLD CULT: NORMAL
BASOPHILS # BLD AUTO: 0.02 X10*3/UL (ref 0–0.1)
BASOPHILS NFR BLD AUTO: 0.2 %
BILIRUB SERPL-MCNC: 6.6 MG/DL (ref 0–1.2)
BUN SERPL-MCNC: 5 MG/DL (ref 6–23)
CALCIUM SERPL-MCNC: 9.1 MG/DL (ref 8.6–10.6)
CHLORIDE SERPL-SCNC: 99 MMOL/L (ref 98–107)
CO2 SERPL-SCNC: 28 MMOL/L (ref 21–32)
CREAT SERPL-MCNC: 0.43 MG/DL (ref 0.5–1.3)
EGFRCR SERPLBLD CKD-EPI 2021: >90 ML/MIN/1.73M*2
ENTEROVIRUS/RHINOVIRUS RVP, VIRC: NOT DETECTED
EOSINOPHIL # BLD AUTO: 0.17 X10*3/UL (ref 0–0.7)
EOSINOPHIL NFR BLD AUTO: 1.5 %
ERYTHROCYTE [DISTWIDTH] IN BLOOD BY AUTOMATED COUNT: 17.9 % (ref 11.5–14.5)
GLUCOSE SERPL-MCNC: 102 MG/DL (ref 74–99)
HCT VFR BLD AUTO: 26.7 % (ref 41–52)
HEMOGLOBIN A2: 3.7 % (ref 2–3.5)
HEMOGLOBIN A2: 4.1 % (ref 2–3.5)
HEMOGLOBIN A: 15.5 % (ref 95.8–98)
HEMOGLOBIN A: 15.6 % (ref 95.8–98)
HEMOGLOBIN F: 0.4 % (ref 0–2)
HEMOGLOBIN F: 2.1 % (ref 0–2)
HEMOGLOBIN IDENTIFICATION INTERPRETATION: ABNORMAL
HEMOGLOBIN IDENTIFICATION INTERPRETATION: ABNORMAL
HEMOGLOBIN S: 78.3 %
HEMOGLOBIN S: 80.3 %
HGB BLD-MCNC: 9.2 G/DL (ref 13.5–17.5)
HGB RETIC QN: 27 PG (ref 28–38)
HOLD SPECIMEN: NORMAL
HUMAN BOCAVIRUS RVP, VIRC: NOT DETECTED
HUMAN CORONAVIRUS RVP, VIRC: NOT DETECTED
IMM GRANULOCYTES # BLD AUTO: 0.06 X10*3/UL (ref 0–0.7)
IMM GRANULOCYTES NFR BLD AUTO: 0.5 % (ref 0–0.9)
IMMATURE RETIC FRACTION: 13.7 %
INFLUENZA A , VIRC: NOT DETECTED
INFLUENZA A H1N1-09 , VIRC: NOT DETECTED
INFLUENZA B PCR, VIRC: NOT DETECTED
LDH SERPL L TO P-CCNC: 636 U/L (ref 84–246)
LYMPHOCYTES # BLD AUTO: 1.79 X10*3/UL (ref 1.2–4.8)
LYMPHOCYTES NFR BLD AUTO: 16.2 %
MCH RBC QN AUTO: 29.3 PG (ref 26–34)
MCHC RBC AUTO-ENTMCNC: 34.5 G/DL (ref 32–36)
MCV RBC AUTO: 85 FL (ref 80–100)
METAPNEUMOVIRUS , VIRC: NOT DETECTED
MONOCYTES # BLD AUTO: 1.95 X10*3/UL (ref 0.1–1)
MONOCYTES NFR BLD AUTO: 17.7 %
NEUTROPHILS # BLD AUTO: 7.05 X10*3/UL (ref 1.2–7.7)
NEUTROPHILS NFR BLD AUTO: 63.9 %
NRBC BLD-RTO: 0.7 /100 WBCS (ref 0–0)
PARAINFLUENZA PCR, VIRC: NOT DETECTED
PATH REVIEW-HGB IDENTIFICATION: ABNORMAL
PATH REVIEW-HGB IDENTIFICATION: ABNORMAL
PLATELET # BLD AUTO: 185 X10*3/UL (ref 150–450)
POTASSIUM SERPL-SCNC: 4 MMOL/L (ref 3.5–5.3)
PROT SERPL-MCNC: 5.8 G/DL (ref 6.4–8.2)
RBC # BLD AUTO: 3.14 X10*6/UL (ref 4.5–5.9)
RETICS #: 0.31 X10*6/UL (ref 0.02–0.12)
RETICS/RBC NFR AUTO: 9.9 % (ref 0.5–2)
RSV PCR, RVP, VIRC: NOT DETECTED
SODIUM SERPL-SCNC: 135 MMOL/L (ref 136–145)
WBC # BLD AUTO: 11 X10*3/UL (ref 4.4–11.3)

## 2024-02-20 PROCEDURE — 2500000001 HC RX 250 WO HCPCS SELF ADMINISTERED DRUGS (ALT 637 FOR MEDICARE OP): Performed by: NURSE PRACTITIONER

## 2024-02-20 PROCEDURE — 2500000002 HC RX 250 W HCPCS SELF ADMINISTERED DRUGS (ALT 637 FOR MEDICARE OP, ALT 636 FOR OP/ED): Performed by: NURSE PRACTITIONER

## 2024-02-20 PROCEDURE — 2500000001 HC RX 250 WO HCPCS SELF ADMINISTERED DRUGS (ALT 637 FOR MEDICARE OP)

## 2024-02-20 PROCEDURE — 93971 EXTREMITY STUDY: CPT | Performed by: STUDENT IN AN ORGANIZED HEALTH CARE EDUCATION/TRAINING PROGRAM

## 2024-02-20 PROCEDURE — 85045 AUTOMATED RETICULOCYTE COUNT: CPT | Performed by: NURSE PRACTITIONER

## 2024-02-20 PROCEDURE — 2500000004 HC RX 250 GENERAL PHARMACY W/ HCPCS (ALT 636 FOR OP/ED)

## 2024-02-20 PROCEDURE — 2500000004 HC RX 250 GENERAL PHARMACY W/ HCPCS (ALT 636 FOR OP/ED): Performed by: NURSE PRACTITIONER

## 2024-02-20 PROCEDURE — 99233 SBSQ HOSP IP/OBS HIGH 50: CPT | Performed by: NURSE PRACTITIONER

## 2024-02-20 PROCEDURE — 2500000005 HC RX 250 GENERAL PHARMACY W/O HCPCS

## 2024-02-20 PROCEDURE — 85025 COMPLETE CBC W/AUTO DIFF WBC: CPT | Performed by: NURSE PRACTITIONER

## 2024-02-20 PROCEDURE — 83615 LACTATE (LD) (LDH) ENZYME: CPT | Performed by: NURSE PRACTITIONER

## 2024-02-20 PROCEDURE — 93970 EXTREMITY STUDY: CPT

## 2024-02-20 PROCEDURE — 1170000001 HC PRIVATE ONCOLOGY ROOM DAILY

## 2024-02-20 PROCEDURE — 80053 COMPREHEN METABOLIC PANEL: CPT | Performed by: NURSE PRACTITIONER

## 2024-02-20 RX ADMIN — ACETAMINOPHEN 650 MG: 325 TABLET ORAL at 06:14

## 2024-02-20 RX ADMIN — LEVOFLOXACIN 750 MG: 750 TABLET, FILM COATED ORAL at 09:03

## 2024-02-20 RX ADMIN — ALLOPURINOL 300 MG: 300 TABLET ORAL at 09:03

## 2024-02-20 RX ADMIN — HYDROMORPHONE HYDROCHLORIDE 4.5 MG: 2 INJECTION, SOLUTION INTRAMUSCULAR; INTRAVENOUS; SUBCUTANEOUS at 19:22

## 2024-02-20 RX ADMIN — ACYCLOVIR 400 MG: 400 TABLET ORAL at 09:03

## 2024-02-20 RX ADMIN — HYDROMORPHONE HYDROCHLORIDE 4 MG: 2 INJECTION, SOLUTION INTRAMUSCULAR; INTRAVENOUS; SUBCUTANEOUS at 07:45

## 2024-02-20 RX ADMIN — SENNOSIDES AND DOCUSATE SODIUM 2 TABLET: 8.6; 5 TABLET ORAL at 21:34

## 2024-02-20 RX ADMIN — ACYCLOVIR 400 MG: 400 TABLET ORAL at 21:34

## 2024-02-20 RX ADMIN — HYDROMORPHONE HYDROCHLORIDE 4.5 MG: 2 INJECTION, SOLUTION INTRAMUSCULAR; INTRAVENOUS; SUBCUTANEOUS at 21:35

## 2024-02-20 RX ADMIN — LIDOCAINE 1 PATCH: 4 PATCH TOPICAL at 09:04

## 2024-02-20 RX ADMIN — HYDROMORPHONE HYDROCHLORIDE 4 MG: 2 INJECTION, SOLUTION INTRAMUSCULAR; INTRAVENOUS; SUBCUTANEOUS at 02:55

## 2024-02-20 RX ADMIN — HYDROMORPHONE HYDROCHLORIDE 4 MG: 2 INJECTION, SOLUTION INTRAMUSCULAR; INTRAVENOUS; SUBCUTANEOUS at 00:23

## 2024-02-20 RX ADMIN — ENOXAPARIN SODIUM 40 MG: 100 INJECTION SUBCUTANEOUS at 21:41

## 2024-02-20 RX ADMIN — HYDROMORPHONE HYDROCHLORIDE 4 MG: 2 INJECTION, SOLUTION INTRAMUSCULAR; INTRAVENOUS; SUBCUTANEOUS at 05:08

## 2024-02-20 RX ADMIN — HYDROMORPHONE HYDROCHLORIDE 4.5 MG: 2 INJECTION, SOLUTION INTRAMUSCULAR; INTRAVENOUS; SUBCUTANEOUS at 14:41

## 2024-02-20 RX ADMIN — PANTOPRAZOLE SODIUM 40 MG: 40 TABLET, DELAYED RELEASE ORAL at 06:14

## 2024-02-20 RX ADMIN — HYDROMORPHONE HYDROCHLORIDE 4.5 MG: 2 INJECTION, SOLUTION INTRAMUSCULAR; INTRAVENOUS; SUBCUTANEOUS at 12:42

## 2024-02-20 RX ADMIN — HYDROMORPHONE HYDROCHLORIDE 4.5 MG: 2 INJECTION, SOLUTION INTRAMUSCULAR; INTRAVENOUS; SUBCUTANEOUS at 09:47

## 2024-02-20 RX ADMIN — FOLIC ACID 1 MG: 1 TABLET ORAL at 09:03

## 2024-02-20 RX ADMIN — POLYETHYLENE GLYCOL 3350 17 G: 17 POWDER, FOR SOLUTION ORAL at 09:04

## 2024-02-20 RX ADMIN — GABAPENTIN 300 MG: 300 CAPSULE ORAL at 21:34

## 2024-02-20 RX ADMIN — HYDROMORPHONE HYDROCHLORIDE 2 MG: 2 INJECTION, SOLUTION INTRAMUSCULAR; INTRAVENOUS; SUBCUTANEOUS at 06:32

## 2024-02-20 RX ADMIN — HYDROMORPHONE HYDROCHLORIDE 4.5 MG: 2 INJECTION, SOLUTION INTRAMUSCULAR; INTRAVENOUS; SUBCUTANEOUS at 23:35

## 2024-02-20 RX ADMIN — SENNOSIDES AND DOCUSATE SODIUM 2 TABLET: 8.6; 5 TABLET ORAL at 09:03

## 2024-02-20 RX ADMIN — HYDROMORPHONE HYDROCHLORIDE 4.5 MG: 2 INJECTION, SOLUTION INTRAMUSCULAR; INTRAVENOUS; SUBCUTANEOUS at 16:42

## 2024-02-20 RX ADMIN — GABAPENTIN 300 MG: 300 CAPSULE ORAL at 06:14

## 2024-02-20 RX ADMIN — GABAPENTIN 300 MG: 300 CAPSULE ORAL at 14:41

## 2024-02-20 ASSESSMENT — COGNITIVE AND FUNCTIONAL STATUS - GENERAL
DAILY ACTIVITIY SCORE: 24
CLIMB 3 TO 5 STEPS WITH RAILING: A LITTLE
DAILY ACTIVITIY SCORE: 24
MOBILITY SCORE: 23
MOBILITY SCORE: 24

## 2024-02-20 ASSESSMENT — PAIN - FUNCTIONAL ASSESSMENT

## 2024-02-20 ASSESSMENT — PAIN SCALES - GENERAL
PAINLEVEL_OUTOF10: 7
PAINLEVEL_OUTOF10: 9
PAINLEVEL_OUTOF10: 7
PAINLEVEL_OUTOF10: 8
PAINLEVEL_OUTOF10: 10 - WORST POSSIBLE PAIN
PAINLEVEL_OUTOF10: 8
PAINLEVEL_OUTOF10: 10 - WORST POSSIBLE PAIN
PAINLEVEL_OUTOF10: 10 - WORST POSSIBLE PAIN
PAINLEVEL_OUTOF10: 9
PAINLEVEL_OUTOF10: 10 - WORST POSSIBLE PAIN
PAINLEVEL_OUTOF10: 9
PAINLEVEL_OUTOF10: 7
PAINLEVEL_OUTOF10: 10 - WORST POSSIBLE PAIN
PAINLEVEL_OUTOF10: 9
PAINLEVEL_OUTOF10: 10 - WORST POSSIBLE PAIN
PAINLEVEL_OUTOF10: 10 - WORST POSSIBLE PAIN
PAINLEVEL_OUTOF10: 9
PAINLEVEL_OUTOF10: 9

## 2024-02-20 NOTE — PROGRESS NOTES
"Joellen Narayan is a 23 y.o. male on day 3 of admission presenting with Sickle-cell disease with pain (CMS/HCC).    Subjective   Seen this AM at the bedside. Ha a slight temp over night and was recultured. Feels like his pain isn't really improving. Pain is mostly in generalized but more severe in his L leg. We discussed slight increase in dilaudid and also an US of the L leg to r/o blood clot. He is eating/drinking well. Having Bms. Remains on 3L NC. Discussed attempting to wean O2 today If able. Denies any fevers/chills, SOB, or CP.    Objective     Physical Exam  HENT:      Head: Normocephalic.      Right Ear: External ear normal.      Left Ear: External ear normal.      Nose: Nose normal.   Eyes:      General: Scleral icterus present.      Extraocular Movements: Extraocular movements intact.      Pupils: Pupils are equal, round, and reactive to light.   Cardiovascular:      Rate and Rhythm: Regular rhythm.   Pulmonary:      Comments: Diminished throughout all lung fields  Abdominal:      General: Abdomen is flat. Bowel sounds are normal.      Palpations: Abdomen is soft.   Musculoskeletal:      Cervical back: Normal range of motion and neck supple.      Comments: Generalized weakness   Skin:     General: Skin is warm and dry.   Neurological:      General: No focal deficit present.      Mental Status: He is alert and oriented to person, place, and time. Mental status is at baseline.   Psychiatric:         Mood and Affect: Mood normal.         Behavior: Behavior normal.         Thought Content: Thought content normal.         Judgment: Judgment normal.       Last Recorded Vitals  Blood pressure 137/70, pulse 107, temperature 37.4 °C (99.3 °F), temperature source Temporal, resp. rate 20, height 1.854 m (6' 1\"), weight 72.6 kg (160 lb), SpO2 94 %.  Intake/Output last 3 Shifts:  I/O last 3 completed shifts:  In: 998.8 (13.8 mL/kg) [I.V.:998.8 (13.8 mL/kg)]  Out: 850 (11.7 mL/kg) [Urine:850 (0.3 mL/kg/hr)]  Weight: " 72.6 kg     Relevant Results        This patient has a central line   Reason for the central line remaining today?  Blood draws, infusions     .Scheduled medications  acyclovir, 400 mg, oral, q12h REYNALDO  allopurinol, 300 mg, oral, Daily  diclofenac sodium, 4 g, Topical, 4x daily  enoxaparin, 40 mg, subcutaneous, Daily  folic acid, 1 mg, oral, Daily  gabapentin, 300 mg, oral, q8h REYNALDO  HYDROmorphone, 0.6 mg, intravenous, Once  levoFLOXacin, 750 mg, oral, q24h REYNALDO  lidocaine, 1 patch, transdermal, Daily  pantoprazole, 40 mg, oral, Daily before breakfast  polyethylene glycol, 17 g, oral, Daily  sennosides-docusate sodium, 2 tablet, oral, BID      Continuous medications       PRN medications  PRN medications: acetaminophen, albuterol, diphenhydrAMINE, HYDROmorphone, lactulose, naloxone, ondansetron    .  Results for orders placed or performed during the hospital encounter of 02/16/24 (from the past 24 hour(s))   Urinalysis with Reflex Culture and Microscopic   Result Value Ref Range    Color, Urine Yellow Light-Yellow, Yellow, Dark-Yellow    Appearance, Urine Clear Clear    Specific Gravity, Urine 1.006 1.005 - 1.035    pH, Urine 6.0 5.0, 5.5, 6.0, 6.5, 7.0, 7.5, 8.0    Protein, Urine NEGATIVE NEGATIVE, 10 (TRACE), 20 (TRACE) mg/dL    Glucose, Urine Normal Normal mg/dL    Blood, Urine NEGATIVE NEGATIVE    Ketones, Urine NEGATIVE NEGATIVE mg/dL    Bilirubin, Urine NEGATIVE NEGATIVE    Urobilinogen, Urine Normal Normal mg/dL    Nitrite, Urine NEGATIVE NEGATIVE    Leukocyte Esterase, Urine NEGATIVE NEGATIVE   Extra Urine Gray Tube   Result Value Ref Range    Extra Tube Hold for add-ons.    Blood Culture    Specimen: Peripheral Venipuncture; Blood culture   Result Value Ref Range    Blood Culture Loaded on Instrument - Culture in progress    Blood Culture    Specimen: Peripheral Venipuncture; Blood culture   Result Value Ref Range    Blood Culture Loaded on Instrument - Culture in progress    CBC and Auto Differential   Result  Value Ref Range    WBC 11.0 4.4 - 11.3 x10*3/uL    nRBC 0.7 (H) 0.0 - 0.0 /100 WBCs    RBC 3.14 (L) 4.50 - 5.90 x10*6/uL    Hemoglobin 9.2 (L) 13.5 - 17.5 g/dL    Hematocrit 26.7 (L) 41.0 - 52.0 %    MCV 85 80 - 100 fL    MCH 29.3 26.0 - 34.0 pg    MCHC 34.5 32.0 - 36.0 g/dL    RDW 17.9 (H) 11.5 - 14.5 %    Platelets 185 150 - 450 x10*3/uL    Neutrophils % 63.9 40.0 - 80.0 %    Immature Granulocytes %, Automated 0.5 0.0 - 0.9 %    Lymphocytes % 16.2 13.0 - 44.0 %    Monocytes % 17.7 2.0 - 10.0 %    Eosinophils % 1.5 0.0 - 6.0 %    Basophils % 0.2 0.0 - 2.0 %    Neutrophils Absolute 7.05 1.20 - 7.70 x10*3/uL    Immature Granulocytes Absolute, Automated 0.06 0.00 - 0.70 x10*3/uL    Lymphocytes Absolute 1.79 1.20 - 4.80 x10*3/uL    Monocytes Absolute 1.95 (H) 0.10 - 1.00 x10*3/uL    Eosinophils Absolute 0.17 0.00 - 0.70 x10*3/uL    Basophils Absolute 0.02 0.00 - 0.10 x10*3/uL   Comprehensive Metabolic Panel   Result Value Ref Range    Glucose 102 (H) 74 - 99 mg/dL    Sodium 135 (L) 136 - 145 mmol/L    Potassium 4.0 3.5 - 5.3 mmol/L    Chloride 99 98 - 107 mmol/L    Bicarbonate 28 21 - 32 mmol/L    Anion Gap 12 10 - 20 mmol/L    Urea Nitrogen 5 (L) 6 - 23 mg/dL    Creatinine 0.43 (L) 0.50 - 1.30 mg/dL    eGFR >90 >60 mL/min/1.73m*2    Calcium 9.1 8.6 - 10.6 mg/dL    Albumin 3.7 3.4 - 5.0 g/dL    Alkaline Phosphatase 209 (H) 33 - 120 U/L    Total Protein 5.8 (L) 6.4 - 8.2 g/dL    AST 68 (H) 9 - 39 U/L    Bilirubin, Total 6.6 (H) 0.0 - 1.2 mg/dL    ALT 37 10 - 52 U/L   Lactate Dehydrogenase   Result Value Ref Range     (H) 84 - 246 U/L   Reticulocytes   Result Value Ref Range    Retic % 9.9 (H) 0.5 - 2.0 %    Retic Absolute 0.311 (H) 0.022 - 0.118 x10*6/uL    Reticulocyte Hemoglobin 27 (L) 28 - 38 pg    Immature Retic fraction 13.7 <=16.0 %       Assessment/Plan   Principal Problem:    Sickle-cell disease with pain (CMS/HCC)  Active Problems:    Sickle cell crisis (CMS/HCC)    Joellen Narayan is a 23 y.o.Male [facility]  of newly diagnosed nodular lymphocyte predominant Hodgkins lymphoma (NLPHL) (on rituxan/prednisone, most recently received C2 2/8/24), 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 Of note, patient was just admitted for sickle cell pain management w/o complications (2/12-2/14). In ED, pt found to have mildly elevated temp 37.5 and leukocytosis (23.3k) higher than his baseline and atypical of his former sickle cell pain presentations. Of note, he was previously on prednisone for his lymphoma treatment (days 1-5) but last received on 2/13 and previous labs did not indicate such pronounced leukocytosis. CXR (2/16) w/o evidence of consolidation. COVID, Flu A/B (2/16) negative. Resp viral panel pending. Blood cultures x2 (2/16-NGTD). Started on Cefepime (2/16-2/19) and Vanc (2/16-2/19), s/p Flagly once in ED. Endorsed some RLQ abdominal pain in ED, and elevated D-dimer 6,363. CT angio chest/abd/pelvis neg for PE but showed L > R GGOs c/f infectious process vs acute chest syndrome. No acute abd pathology, substantial improvement in lymphadenopathy. 2/17 Pt developed new O2 requirement to 3L, became febrile 38, had significant chest pain, and toxic ill appearance meeting criteria for ACS. Transfusion Med consulted for urgent RBC exchange and IR consulted for apheresis line placement, s/p line placement/ RBC exchange 2/17 evening. 2/19 stopped IV atbs d/t hallucinations with cefepime (allergy to ceftriaxone and cross-sensitivity), started Levaquin (2/19- current). s/p dPCA (2/17-2/19), transitioned to IVP dilaudid 2/19. Had a slight fever over night 2/19, repeat Bcx x2 NGTD, UA neg, continued Levaquin. DC home pending improvement in pain, infectious  process, respiratory status/hemodynamic stability.      # Hgb SS with severe pain  # Acute Chest Syndrome   - Follows in  sickle cell clinic, last seen on 1/23/24  - [place]AR[place] reviewed, no aberrant behavior noted   - Not on any disease modifying medications (per outpatient note 1/23/24, was sent oxybryta rx for DMT but denied by insurance, must trial endari first, sent rx for endari but patient has yet to start it)  - Hgb baseline ~8.5, Hgb 8.5 (2/16) --> post RBC exchange Hgb 10 (2/18)--> Hgb 9.2 (2/20)  - Tbili baseline fluctuates ~5-13, Tbili 10.6 (2/18)--> 6.6 (2/20)  - LDH baseline fluctuates but ~500,  (2/18)--> 636 (2/20)  - No current care path  - Mildly elevated temp in ED of 37.5 and leukocytosis of 23.3k with left shift (2/16), baseline ~13 with previous sickle cell pain admissions --> (2/18) WBC 11  - s/p prednisone as part of chemo regimen on days 1-5 but last received 2/13 and labs did not indicate such pronounced leukocytosis at that time   - 2V CXR (2/16) w/o evidence of consolidation  - COVID, Flu A/B (2/16) negative  - Resp viral panel (2/16) pending  - s/p on admit started IV dilaudid 4mg q2hrs PRN severe pain (2/16-2/17); was writhing in pain and appeared in significant distress  - (2/17-2/19) s/p dPCA  - 2/19 started IV dilaudid 4mg q2hrs PRN severe pain, increased 2/20 to 4.5mg  - s/p IVP Toradol 30mg q6hrs x3 days (2/16-2/19) with Protonix for PPI prophy  - Continue home folic acid 1mg daily  - Hgb S (2/16) 78.3%, 2/14 Hg S 80.3%, post-exchange Hgb S (2/19) 25.2%  - Utox (2/16) +MJ  - PO Zofran PRN for opioid-induced nausea, PO Benadryl PRN for opioid-induced pruritus  - Bowel regimen for opioid-induced constipation with DocuSenna 2tabs BID, Miralax daily, Lactulose BID PRN; LBM 2/19  - 2/16 CT PE showed negative PE but L > R basilar GGOs w/ more consolidative opacity within LLL c/f c/f infectious process vs acute chest syndrome  - s/p Flagly once in ED, s/p Cefepime (2/16-2/19) and Vanc  (2/16-2/19)   - 2/19 stopped IV atbs d/t hallucinations with cefepime (allergy to ceftriaxone and cross-sensitivity), started Levaquin (2/19- current)  - 2/17 AM Pt became febrile 38, developed new O2 requirement to 3L NC, reports chest pain, and has toxic ill appearance, pt briefly tachypneic 60s prior to PCA being started now improved, reports feel worse than his ACS episode from last Feb 2023   - ACS is defined as radiographic evidence of consolidation plus fever > 38.5, New oxygen req, severe chest pain, Respiratory distress or new wheezing  - Both IR on-call attending and MICU consulted for urgent apheresis line placement, s/p apheresis line placement by IR in evening 2/17  - Transfusion Med consulted for urgent RBC exchange, s/p RBC exchange 2/17  - Blood cultures x2 (2/16 and 2/19 NGTD)  - Lactate level 1.7, Procal 0.82 (2/19)  - Noted some RLQ abdominal pain in ED, and elevated D-dimer 6,363  - 2/16 CT A/P w/ contrast ordered to r/o PE and intra-abdominal pathology, showed no acute abdominopelvic abnormality, cholelithiasis, extensive adenopathy improved from prior PET 12/2023   - Amylase 15, lipase 4  - 2/20 ordered duplex LLE for continued severe pain; pending     # Nodular lymphocyte predominant Hodgkins lymphoma (NLPHL)   - Primary Oncologist: Dr. Stoll-- emailed/updated on admit 2/16  - Enlarged lymph nodes noted 4/1/22  - RUQ US (11/14/22) with mildly enlarged LNs in the region of the kavin hepatis  - MRI liver (11/16/22) showed re-demonstration of bulky retroperitoneal lymphadenopathy and kavin hepatic lymphadenopathy    - (11/18/22) lymph node biopsy showed atypical lymphoid infiltrate. Reviewed by Hemepath board, insufficient for lymphoma diagnosis  - PET/CT (12/6/22) showing retroperitoneal lymphadenopathy  - Followed up with Dr. Stoll (12/16/22) with plan for surg/onc consult for large tissue bx but patient missed apt and was lost to follow up  - Requested new apt with Dr. Ronnie Marte 6/19/23,  patient was not seen and lost to follow up  - (11/28/23) CT A/P showed increased size of retroperitoneal lymph nodes reflecting extramedullary hematopoiesis I/s/o sickle cell vs lymphoma  - Paraaortic LN bx via IR (11/30/23) consistent with NLPHL. Flow: no clonal B cell or T cell population identified, lymphocyte 95%, CD3+CD4+ 68%, CD3+CD8+ 7%, CD19+ 24%  - Elevated LDH/bili partially from sickle cell disease   - Chemotherapy (R-CHOP) was discussed with primary oncologist Dr. Stoll, and decision was made to simplify his chemotherapy to Rituxan and prednisone q3 weeks mainly due to frequent sickle cell crisis  - Current chemo regimen: rituxan and prednisone q3 weeks (C1 1/18/24, C2 2/8/24, C3 due 2/29/24)  - 2/16 started Acyclovir 400mg BID prophy per Dr. Stoll     # Hx of nocturnal oxygen dependence and hypoxia on room air  - Historically improves with oxygen supplementation  - No O2 needs on admission, SpO2 was stable  - Has not had home oxygen for 2-3 years   - Pt did not qualify for home O2 per walking pulse ox performed during previous hospital admission on 2/14/24  - 2/17 Developed new 3L O2 requirement, will attempt to wean today 2/20  - Continuous pulse ox   - Has pulm apt on 2/21/24     DVT prophy: Lovenox subcutaneous, SCDs, encourage ambulation     DISPO:  - Full Code  - DC home pending improvement in pain, infectious process, respiratory status/hemodynamic stability   - FUV Gastro NPV and Pulm NPV on 2/21, Infusion for C3 ritux on 2/29. Sickle Cell FUV (Renee Barrera) on 3/20     I spent >60 minutes in the professional and overall care of this patient.     Assessment and plan discussed with attending physician Dr. Alex Jenkins, APRN-CNP

## 2024-02-20 NOTE — CARE PLAN
Problem: Pain  Goal: My pain/discomfort is manageable  Outcome: Progressing     Problem: Safety  Goal: Patient will be injury free during hospitalization  Outcome: Progressing  Goal: I will remain free of falls  Outcome: Progressing     Problem: Daily Care  Goal: Daily care needs are met  Outcome: Progressing     Problem: Psychosocial Needs  Goal: Demonstrates ability to cope with hospitalization/illness  Outcome: Progressing  Goal: Collaborate with me, my family, and caregiver to identify my specific goals  Outcome: Progressing  Flowsheets (Taken 2/20/2024 0700)  Cultural Requests During Hospitalization: none  Spiritual Requests During Hospitalization: none     Problem: Discharge Barriers  Goal: My discharge needs are met  Outcome: Progressing   The patient's goals for the shift include      The clinical goals for the shift include Pt will remain safe in room and use call ligjht during shift on 2/20/24 @ 1930    Pt had LLE US that was negative for DVT. Pain meds increased. Pt reports improving pain. Pt was able to shower, VSS.

## 2024-02-20 NOTE — PROGRESS NOTES
History Of Present Illness  Joeleln Narayan is a 23 y.o. male with h/o    Sickle cell disease,   Past Medical History  He has a past medical history of Corrosion of unspecified body region, unspecified degree (12/31/2014), Impetigo (01/04/2024), Personal history of diseases of the blood and blood-forming organs and certain disorders involving the immune mechanism, Personal history of other (healed) physical injury and trauma (01/03/2015), Personal history of other diseases of the circulatory system, Personal history of other diseases of the circulatory system, Rash and other nonspecific skin eruption (09/09/2014), and Sickle-cell disease with pain (CMS/HCC) (12/19/2023).    Surgical History  He has a past surgical history that includes IR CVC tunneled (6/21/2022); CT guided percutaneous biopsy LYMPH node superficial (11/18/2022); and CT guided percutaneous biopsy retroperitoneum (11/30/2023).     Social History  He reports that he has never smoked. He has been exposed to tobacco smoke. He has never used smokeless tobacco. He reports that he does not drink alcohol and does not use drugs.    Family History  Family History   Problem Relation Name Age of Onset    Sickle cell trait Mother      Sickle cell trait Father      Lung cancer Brother          Allergies  Cefepime, Amoxicillin, and Ceftriaxone      Review of Systems       There were no vitals taken for this visit.  Physical Exam           Relevant Results      Assessment/Plan:  {Assess/PlanSmartLinks:22607}    Sade Loza PA-C

## 2024-02-20 NOTE — SIGNIFICANT EVENT
Rapid Response RN Note    Rapid response RN at bedside for RADAR score 6 due to the recent VS listed below:     Vitals:    02/19/24 0944 02/19/24 1426 02/19/24 1835 02/19/24 2203   BP: 132/76 121/74 134/84 155/78   BP Location:       Patient Position:   Lying    Pulse: 90 90 94 (!) 114   Resp: 20 18 18 18   Temp: 36.3 °C (97.3 °F) 36.3 °C (97.3 °F) 38.3 °C (100.9 °F) 40 °C (104 °F)   TempSrc:    Temporal   SpO2: 98% 98% 98% 98%   Weight:       Height:            Reviewed above VS with bedside RN.  Spoke to RN and NP.  Pt was febrile earlier and blood cultures were obtained.   Team did not want to start him on antibiotics.  Plan to medicate him for pain and give him Tylenol.  No interventions by rapid response team indicated at this time.      Patient denied pain, shortness of breath, dizziness or lightheadedness.      Staff to page rapid response for any concerns or acute change in condition/VS.

## 2024-02-20 NOTE — CARE PLAN
Problem: Pain  Goal: My pain/discomfort is manageable  Outcome: Progressing     Problem: Safety  Goal: Patient will be injury free during hospitalization  Outcome: Progressing  Goal: I will remain free of falls  Outcome: Progressing     Problem: Daily Care  Goal: Daily care needs are met  Outcome: Progressing     Problem: Psychosocial Needs  Goal: Demonstrates ability to cope with hospitalization/illness  Outcome: Progressing  Goal: Collaborate with me, my family, and caregiver to identify my specific goals  Outcome: Progressing     Problem: Discharge Barriers  Goal: My discharge needs are met  Outcome: Progressing        The clinical goals for the shift include pain control

## 2024-02-21 ENCOUNTER — APPOINTMENT (OUTPATIENT)
Dept: RADIOLOGY | Facility: HOSPITAL | Age: 24
DRG: 811 | End: 2024-02-21
Payer: COMMERCIAL

## 2024-02-21 ENCOUNTER — APPOINTMENT (OUTPATIENT)
Dept: PULMONOLOGY | Facility: HOSPITAL | Age: 24
End: 2024-02-21
Payer: COMMERCIAL

## 2024-02-21 ENCOUNTER — APPOINTMENT (OUTPATIENT)
Dept: GASTROENTEROLOGY | Facility: HOSPITAL | Age: 24
End: 2024-02-21
Payer: COMMERCIAL

## 2024-02-21 LAB
ALBUMIN SERPL BCP-MCNC: 4.1 G/DL (ref 3.4–5)
ALP SERPL-CCNC: 226 U/L (ref 33–120)
ALT SERPL W P-5'-P-CCNC: 46 U/L (ref 10–52)
ANION GAP SERPL CALC-SCNC: 14 MMOL/L (ref 10–20)
APPEARANCE UR: CLEAR
AST SERPL W P-5'-P-CCNC: 67 U/L (ref 9–39)
BASOPHILS # BLD AUTO: 0.03 X10*3/UL (ref 0–0.1)
BASOPHILS NFR BLD AUTO: 0.2 %
BILIRUB SERPL-MCNC: 6.1 MG/DL (ref 0–1.2)
BILIRUB UR STRIP.AUTO-MCNC: ABNORMAL MG/DL
BUN SERPL-MCNC: 6 MG/DL (ref 6–23)
CALCIUM SERPL-MCNC: 9.3 MG/DL (ref 8.6–10.6)
CHLORIDE SERPL-SCNC: 95 MMOL/L (ref 98–107)
CK SERPL-CCNC: 90 U/L (ref 0–325)
CO2 SERPL-SCNC: 27 MMOL/L (ref 21–32)
COLOR UR: YELLOW
CREAT SERPL-MCNC: 0.49 MG/DL (ref 0.5–1.3)
EGFRCR SERPLBLD CKD-EPI 2021: >90 ML/MIN/1.73M*2
EOSINOPHIL # BLD AUTO: 0.07 X10*3/UL (ref 0–0.7)
EOSINOPHIL NFR BLD AUTO: 0.5 %
ERYTHROCYTE [DISTWIDTH] IN BLOOD BY AUTOMATED COUNT: 17.8 % (ref 11.5–14.5)
GLUCOSE SERPL-MCNC: 103 MG/DL (ref 74–99)
GLUCOSE UR STRIP.AUTO-MCNC: NORMAL MG/DL
HCT VFR BLD AUTO: 28.9 % (ref 41–52)
HGB BLD-MCNC: 9.7 G/DL (ref 13.5–17.5)
HGB RETIC QN: 25 PG (ref 28–38)
HOLD SPECIMEN: NORMAL
IMM GRANULOCYTES # BLD AUTO: 0.09 X10*3/UL (ref 0–0.7)
IMM GRANULOCYTES NFR BLD AUTO: 0.6 % (ref 0–0.9)
IMMATURE RETIC FRACTION: 14.7 %
KETONES UR STRIP.AUTO-MCNC: NEGATIVE MG/DL
LDH SERPL L TO P-CCNC: 791 U/L (ref 84–246)
LEUKOCYTE ESTERASE UR QL STRIP.AUTO: NEGATIVE
LYMPHOCYTES # BLD AUTO: 2.78 X10*3/UL (ref 1.2–4.8)
LYMPHOCYTES NFR BLD AUTO: 19.5 %
MCH RBC QN AUTO: 28.9 PG (ref 26–34)
MCHC RBC AUTO-ENTMCNC: 33.6 G/DL (ref 32–36)
MCV RBC AUTO: 86 FL (ref 80–100)
MONOCYTES # BLD AUTO: 2.43 X10*3/UL (ref 0.1–1)
MONOCYTES NFR BLD AUTO: 17 %
MUCOUS THREADS #/AREA URNS AUTO: NORMAL /LPF
NEUTROPHILS # BLD AUTO: 8.88 X10*3/UL (ref 1.2–7.7)
NEUTROPHILS NFR BLD AUTO: 62.2 %
NITRITE UR QL STRIP.AUTO: NEGATIVE
NRBC BLD-RTO: 0.3 /100 WBCS (ref 0–0)
PH UR STRIP.AUTO: 6.5 [PH]
PLATELET # BLD AUTO: 210 X10*3/UL (ref 150–450)
POTASSIUM SERPL-SCNC: 5.3 MMOL/L (ref 3.5–5.3)
PROT SERPL-MCNC: 6.7 G/DL (ref 6.4–8.2)
PROT UR STRIP.AUTO-MCNC: ABNORMAL MG/DL
RBC # BLD AUTO: 3.36 X10*6/UL (ref 4.5–5.9)
RBC # UR STRIP.AUTO: NEGATIVE /UL
RBC #/AREA URNS AUTO: NORMAL /HPF
RETICS #: 0.24 X10*6/UL (ref 0.02–0.12)
RETICS/RBC NFR AUTO: 7 % (ref 0.5–2)
SODIUM SERPL-SCNC: 131 MMOL/L (ref 136–145)
SP GR UR STRIP.AUTO: 1.01
UROBILINOGEN UR STRIP.AUTO-MCNC: ABNORMAL MG/DL
WBC # BLD AUTO: 14.3 X10*3/UL (ref 4.4–11.3)
WBC #/AREA URNS AUTO: NORMAL /HPF

## 2024-02-21 PROCEDURE — 99233 SBSQ HOSP IP/OBS HIGH 50: CPT | Performed by: NURSE PRACTITIONER

## 2024-02-21 PROCEDURE — 87040 BLOOD CULTURE FOR BACTERIA: CPT | Performed by: NURSE PRACTITIONER

## 2024-02-21 PROCEDURE — 85025 COMPLETE CBC W/AUTO DIFF WBC: CPT | Performed by: NURSE PRACTITIONER

## 2024-02-21 PROCEDURE — A4217 STERILE WATER/SALINE, 500 ML: HCPCS

## 2024-02-21 PROCEDURE — 85045 AUTOMATED RETICULOCYTE COUNT: CPT | Performed by: NURSE PRACTITIONER

## 2024-02-21 PROCEDURE — 73701 CT LOWER EXTREMITY W/DYE: CPT | Mod: LT

## 2024-02-21 PROCEDURE — 83615 LACTATE (LD) (LDH) ENZYME: CPT | Performed by: NURSE PRACTITIONER

## 2024-02-21 PROCEDURE — 2550000001 HC RX 255 CONTRASTS: Performed by: INTERNAL MEDICINE

## 2024-02-21 PROCEDURE — 36415 COLL VENOUS BLD VENIPUNCTURE: CPT | Performed by: NURSE PRACTITIONER

## 2024-02-21 PROCEDURE — 71045 X-RAY EXAM CHEST 1 VIEW: CPT | Performed by: RADIOLOGY

## 2024-02-21 PROCEDURE — 71045 X-RAY EXAM CHEST 1 VIEW: CPT

## 2024-02-21 PROCEDURE — 2500000004 HC RX 250 GENERAL PHARMACY W/ HCPCS (ALT 636 FOR OP/ED)

## 2024-02-21 PROCEDURE — 2500000001 HC RX 250 WO HCPCS SELF ADMINISTERED DRUGS (ALT 637 FOR MEDICARE OP)

## 2024-02-21 PROCEDURE — 76937 US GUIDE VASCULAR ACCESS: CPT

## 2024-02-21 PROCEDURE — 1170000001 HC PRIVATE ONCOLOGY ROOM DAILY

## 2024-02-21 PROCEDURE — 81001 URINALYSIS AUTO W/SCOPE: CPT | Performed by: NURSE PRACTITIONER

## 2024-02-21 PROCEDURE — 73701 CT LOWER EXTREMITY W/DYE: CPT | Mod: LEFT SIDE | Performed by: RADIOLOGY

## 2024-02-21 PROCEDURE — 82550 ASSAY OF CK (CPK): CPT

## 2024-02-21 PROCEDURE — 2500000005 HC RX 250 GENERAL PHARMACY W/O HCPCS

## 2024-02-21 PROCEDURE — 2500000001 HC RX 250 WO HCPCS SELF ADMINISTERED DRUGS (ALT 637 FOR MEDICARE OP): Performed by: NURSE PRACTITIONER

## 2024-02-21 PROCEDURE — 80053 COMPREHEN METABOLIC PANEL: CPT | Performed by: NURSE PRACTITIONER

## 2024-02-21 PROCEDURE — 2500000004 HC RX 250 GENERAL PHARMACY W/ HCPCS (ALT 636 FOR OP/ED): Performed by: NURSE PRACTITIONER

## 2024-02-21 PROCEDURE — 2500000002 HC RX 250 W HCPCS SELF ADMINISTERED DRUGS (ALT 637 FOR MEDICARE OP, ALT 636 FOR OP/ED): Performed by: NURSE PRACTITIONER

## 2024-02-21 RX ADMIN — HYDROMORPHONE HYDROCHLORIDE 4.5 MG: 2 INJECTION, SOLUTION INTRAMUSCULAR; INTRAVENOUS; SUBCUTANEOUS at 07:57

## 2024-02-21 RX ADMIN — HYDROMORPHONE HYDROCHLORIDE 6 MG: 2 INJECTION, SOLUTION INTRAMUSCULAR; INTRAVENOUS; SUBCUTANEOUS at 20:15

## 2024-02-21 RX ADMIN — SENNOSIDES AND DOCUSATE SODIUM 2 TABLET: 8.6; 5 TABLET ORAL at 07:56

## 2024-02-21 RX ADMIN — GABAPENTIN 300 MG: 300 CAPSULE ORAL at 13:54

## 2024-02-21 RX ADMIN — PIPERACILLIN SODIUM AND TAZOBACTAM SODIUM 3.38 G: 3; .375 INJECTION, SOLUTION INTRAVENOUS at 23:36

## 2024-02-21 RX ADMIN — HYDROMORPHONE HYDROCHLORIDE 4.5 MG: 2 INJECTION, SOLUTION INTRAMUSCULAR; INTRAVENOUS; SUBCUTANEOUS at 03:42

## 2024-02-21 RX ADMIN — PIPERACILLIN SODIUM AND TAZOBACTAM SODIUM 3.38 G: 3; .375 INJECTION, SOLUTION INTRAVENOUS at 11:54

## 2024-02-21 RX ADMIN — ENOXAPARIN SODIUM 40 MG: 100 INJECTION SUBCUTANEOUS at 20:14

## 2024-02-21 RX ADMIN — PANTOPRAZOLE SODIUM 40 MG: 40 TABLET, DELAYED RELEASE ORAL at 06:12

## 2024-02-21 RX ADMIN — LIDOCAINE 1 PATCH: 4 PATCH TOPICAL at 07:57

## 2024-02-21 RX ADMIN — PIPERACILLIN SODIUM AND TAZOBACTAM SODIUM 3.38 G: 3; .375 INJECTION, SOLUTION INTRAVENOUS at 17:35

## 2024-02-21 RX ADMIN — SENNOSIDES AND DOCUSATE SODIUM 2 TABLET: 8.6; 5 TABLET ORAL at 20:14

## 2024-02-21 RX ADMIN — VANCOMYCIN HYDROCHLORIDE 1250 MG: 5 INJECTION, POWDER, LYOPHILIZED, FOR SOLUTION INTRAVENOUS at 22:05

## 2024-02-21 RX ADMIN — FOLIC ACID 1 MG: 1 TABLET ORAL at 07:57

## 2024-02-21 RX ADMIN — HYDROMORPHONE HYDROCHLORIDE 6 MG: 2 INJECTION, SOLUTION INTRAMUSCULAR; INTRAVENOUS; SUBCUTANEOUS at 13:54

## 2024-02-21 RX ADMIN — HYDROMORPHONE HYDROCHLORIDE 6 MG: 2 INJECTION, SOLUTION INTRAMUSCULAR; INTRAVENOUS; SUBCUTANEOUS at 22:15

## 2024-02-21 RX ADMIN — HYDROMORPHONE HYDROCHLORIDE 6 MG: 2 INJECTION, SOLUTION INTRAMUSCULAR; INTRAVENOUS; SUBCUTANEOUS at 09:56

## 2024-02-21 RX ADMIN — POLYETHYLENE GLYCOL 3350 17 G: 17 POWDER, FOR SOLUTION ORAL at 07:58

## 2024-02-21 RX ADMIN — HYDROMORPHONE HYDROCHLORIDE 6 MG: 2 INJECTION, SOLUTION INTRAMUSCULAR; INTRAVENOUS; SUBCUTANEOUS at 11:51

## 2024-02-21 RX ADMIN — ACYCLOVIR 400 MG: 400 TABLET ORAL at 20:14

## 2024-02-21 RX ADMIN — HYDROMORPHONE HYDROCHLORIDE 4.5 MG: 2 INJECTION, SOLUTION INTRAMUSCULAR; INTRAVENOUS; SUBCUTANEOUS at 01:37

## 2024-02-21 RX ADMIN — HYDROMORPHONE HYDROCHLORIDE 4.5 MG: 2 INJECTION, SOLUTION INTRAMUSCULAR; INTRAVENOUS; SUBCUTANEOUS at 05:44

## 2024-02-21 RX ADMIN — GABAPENTIN 300 MG: 300 CAPSULE ORAL at 05:45

## 2024-02-21 RX ADMIN — ALLOPURINOL 300 MG: 300 TABLET ORAL at 08:00

## 2024-02-21 RX ADMIN — ACYCLOVIR 400 MG: 400 TABLET ORAL at 08:00

## 2024-02-21 RX ADMIN — LEVOFLOXACIN 750 MG: 750 TABLET, FILM COATED ORAL at 07:57

## 2024-02-21 RX ADMIN — HYDROMORPHONE HYDROCHLORIDE 6 MG: 2 INJECTION, SOLUTION INTRAMUSCULAR; INTRAVENOUS; SUBCUTANEOUS at 15:57

## 2024-02-21 RX ADMIN — IOHEXOL 75 ML: 350 INJECTION, SOLUTION INTRAVENOUS at 14:58

## 2024-02-21 RX ADMIN — HYDROMORPHONE HYDROCHLORIDE 6 MG: 2 INJECTION, SOLUTION INTRAMUSCULAR; INTRAVENOUS; SUBCUTANEOUS at 18:15

## 2024-02-21 RX ADMIN — GABAPENTIN 300 MG: 300 CAPSULE ORAL at 22:04

## 2024-02-21 ASSESSMENT — PAIN - FUNCTIONAL ASSESSMENT
PAIN_FUNCTIONAL_ASSESSMENT: 0-10

## 2024-02-21 ASSESSMENT — COGNITIVE AND FUNCTIONAL STATUS - GENERAL
STANDING UP FROM CHAIR USING ARMS: A LITTLE
CLIMB 3 TO 5 STEPS WITH RAILING: A LITTLE
TURNING FROM BACK TO SIDE WHILE IN FLAT BAD: A LITTLE
DRESSING REGULAR LOWER BODY CLOTHING: A LITTLE
MOVING FROM LYING ON BACK TO SITTING ON SIDE OF FLAT BED WITH BEDRAILS: A LITTLE
WALKING IN HOSPITAL ROOM: A LITTLE
HELP NEEDED FOR BATHING: A LITTLE
STANDING UP FROM CHAIR USING ARMS: A LITTLE
DAILY ACTIVITIY SCORE: 21
CLIMB 3 TO 5 STEPS WITH RAILING: A LITTLE
HELP NEEDED FOR BATHING: A LITTLE
WALKING IN HOSPITAL ROOM: A LITTLE
MOBILITY SCORE: 20
MOVING TO AND FROM BED TO CHAIR: A LITTLE
DAILY ACTIVITIY SCORE: 21
MOVING TO AND FROM BED TO CHAIR: A LITTLE
MOBILITY SCORE: 18
DRESSING REGULAR UPPER BODY CLOTHING: A LITTLE
DRESSING REGULAR UPPER BODY CLOTHING: A LITTLE
DRESSING REGULAR LOWER BODY CLOTHING: A LITTLE

## 2024-02-21 ASSESSMENT — PAIN SCALES - GENERAL
PAINLEVEL_OUTOF10: 9
PAINLEVEL_OUTOF10: 10 - WORST POSSIBLE PAIN
PAINLEVEL_OUTOF10: 9
PAINLEVEL_OUTOF10: 10 - WORST POSSIBLE PAIN
PAINLEVEL_OUTOF10: 10 - WORST POSSIBLE PAIN
PAINLEVEL_OUTOF10: 9
PAINLEVEL_OUTOF10: 10 - WORST POSSIBLE PAIN
PAINLEVEL_OUTOF10: 10 - WORST POSSIBLE PAIN
PAINLEVEL_OUTOF10: 7
PAINLEVEL_OUTOF10: 10 - WORST POSSIBLE PAIN
PAINLEVEL_OUTOF10: 8
PAINLEVEL_OUTOF10: 8
PAINLEVEL_OUTOF10: 7
PAINLEVEL_OUTOF10: 9
PAINLEVEL_OUTOF10: 9

## 2024-02-21 NOTE — CARE PLAN
Problem: Pain  Goal: My pain/discomfort is manageable  Outcome: Progressing     Problem: Safety  Goal: Patient will be injury free during hospitalization  Outcome: Progressing  Goal: I will remain free of falls  Outcome: Progressing     Problem: Daily Care  Goal: Daily care needs are met  Outcome: Progressing     Problem: Psychosocial Needs  Goal: Demonstrates ability to cope with hospitalization/illness  Outcome: Progressing  Goal: Collaborate with me, my family, and caregiver to identify my specific goals  Outcome: Progressing  Flowsheets (Taken 2/21/2024 3296)  Cultural Requests During Hospitalization: none  Spiritual Requests During Hospitalization: none     Problem: Discharge Barriers  Goal: My discharge needs are met  Outcome: Progressing   The patient's goals for the shift include      The clinical goals for the shift include Pt will remain safe in room and use call ligjht during shift on 2/21/24 @  1930    Pt received a CT scan of the left knee, increased pain medication and was febrile. Bcx2 sent with cxr and UA.

## 2024-02-21 NOTE — PROGRESS NOTES
Music Therapy Note    Joellen Narayan     Therapy Session  Referral Type: New referral this admission  Visit Type: Follow-up visit  Session Start Time: 1448  Session End Time: 1550  Intervention Delivery: In-person  Conflict of Service: None  Number of family members present: 1  Family Present for Session: Parent/Guardian  Family Participation: Supportive     Pre-assessment  Unable to Assess Reason: Outcomes not assessed  Mood/Affect: Appropriate, Calm, Cooperative, Participative         Treatment/Interventions  Areas of Focus: Pain management, Coping  Music Therapy Interventions: Active music engagement, Improvisation, Empathic listening/validating emotions  Interruption: No  Patient Fell Asleep at End of Session: No    Post-assessment  Unable to Assess Reason: Outcomes not evaluated  Mood/Affect: Appropriate, Calm, Cooperative, Participative  Continue Visiting: Yes  Total Session Time (min): 62 minutes    Narrative  Assessment Detail: Patient presented awake and alert, sitting in bedside chair, displaying appropriate affect. Patient voiced that his pain had not improved much but he was still interested in a music therapy session this day.  Plan: MT engaged pt and pt mother in active music making and improvisation as a means of promoting pain management, coping, and family bonding.  Intervention: Patient and pt mother participated in music intervention by playing steel tongue drums while MT provided guitar accompaniment.  Evaluation: Patient and pt mother were both participative in music intervention by actively playing steel tongue drums with minimal prompting from MT. Patient voiced benefit of music intervention and requested to be followed at infusion.  Follow-up: MT to continue to follow.    Education Documentation  No documentation found.

## 2024-02-21 NOTE — CARE PLAN
Problem: Pain  Goal: My pain/discomfort is manageable  Outcome: Progressing     Problem: Safety  Goal: Patient will be injury free during hospitalization  Outcome: Progressing  Goal: I will remain free of falls  Outcome: Progressing     Problem: Daily Care  Goal: Daily care needs are met  Outcome: Progressing     Problem: Psychosocial Needs  Goal: Demonstrates ability to cope with hospitalization/illness  Outcome: Progressing  Goal: Collaborate with me, my family, and caregiver to identify my specific goals  Outcome: Progressing     Problem: Discharge Barriers  Goal: My discharge needs are met  Outcome: Progressing    The clinical goals for the shift include pt will rate pain less than 7/10 by the end of my shift

## 2024-02-21 NOTE — PROGRESS NOTES
"Joellen Narayan is a 23 y.o. male on day 4 of admission presenting with Sickle-cell disease with pain (CMS/HCC).    Subjective   Seen this AM at the bedside. Patient reports that his pain in his LLE is not improving. He states he can hardly walk because the pain is so severe. We discussed that duplex was negative for DVT yesterday, however on exam the L calf area is extremely tender, hot, and very firm. We discussed plan for CT of the LLE today. Also discussed increasing pain meds d/t severe pain. Denies any fevers/chills, SOB, or CP.    Objective     Physical Exam  HENT:      Head: Normocephalic.      Right Ear: External ear normal.      Left Ear: External ear normal.      Nose: Nose normal.   Eyes:      General: Scleral icterus present.      Extraocular Movements: Extraocular movements intact.      Pupils: Pupils are equal, round, and reactive to light.   Cardiovascular:      Rate and Rhythm: Regular rhythm.   Pulmonary:      Comments: Diminished throughout all lung fields  Abdominal:      General: Abdomen is flat. Bowel sounds are normal.      Palpations: Abdomen is soft.   Musculoskeletal:      Cervical back: Normal range of motion and neck supple.      Comments: L calf extremely tender, hot, and very firm throughout  Generalized weakness   Skin:     General: Skin is warm and dry.   Neurological:      General: No focal deficit present.      Mental Status: He is alert and oriented to person, place, and time. Mental status is at baseline.   Psychiatric:         Mood and Affect: Mood normal.         Behavior: Behavior normal.         Thought Content: Thought content normal.         Judgment: Judgment normal.       Last Recorded Vitals  Blood pressure 144/81, pulse 103, temperature 36.9 °C (98.4 °F), temperature source Temporal, resp. rate 18, height 1.854 m (6' 1\"), weight 72.6 kg (160 lb), SpO2 94 %.  Intake/Output last 3 Shifts:  I/O last 3 completed shifts:  In: 1050 (14.5 mL/kg) [P.O.:1050]  Out: 2600 (35.8 " mL/kg) [Urine:2600 (1 mL/kg/hr)]  Weight: 72.6 kg     Relevant Results        This patient has a central line   Reason for the central line remaining today?  Blood draws, infusions     .Scheduled medications  acyclovir, 400 mg, oral, q12h REYNALDO  allopurinol, 300 mg, oral, Daily  diclofenac sodium, 4 g, Topical, 4x daily  enoxaparin, 40 mg, subcutaneous, Daily  folic acid, 1 mg, oral, Daily  gabapentin, 300 mg, oral, q8h REYNALDO  HYDROmorphone, 0.6 mg, intravenous, Once  levoFLOXacin, 750 mg, oral, q24h REYNALDO  lidocaine, 1 patch, transdermal, Daily  pantoprazole, 40 mg, oral, Daily before breakfast  polyethylene glycol, 17 g, oral, Daily  sennosides-docusate sodium, 2 tablet, oral, BID      Continuous medications       PRN medications  PRN medications: acetaminophen, albuterol, diphenhydrAMINE, HYDROmorphone, lactulose, naloxone, ondansetron    .  Results for orders placed or performed during the hospital encounter of 02/16/24 (from the past 24 hour(s))   CBC and Auto Differential   Result Value Ref Range    WBC 14.3 (H) 4.4 - 11.3 x10*3/uL    nRBC 0.3 (H) 0.0 - 0.0 /100 WBCs    RBC 3.36 (L) 4.50 - 5.90 x10*6/uL    Hemoglobin 9.7 (L) 13.5 - 17.5 g/dL    Hematocrit 28.9 (L) 41.0 - 52.0 %    MCV 86 80 - 100 fL    MCH 28.9 26.0 - 34.0 pg    MCHC 33.6 32.0 - 36.0 g/dL    RDW 17.8 (H) 11.5 - 14.5 %    Platelets 210 150 - 450 x10*3/uL    Neutrophils % 62.2 40.0 - 80.0 %    Immature Granulocytes %, Automated 0.6 0.0 - 0.9 %    Lymphocytes % 19.5 13.0 - 44.0 %    Monocytes % 17.0 2.0 - 10.0 %    Eosinophils % 0.5 0.0 - 6.0 %    Basophils % 0.2 0.0 - 2.0 %    Neutrophils Absolute 8.88 (H) 1.20 - 7.70 x10*3/uL    Immature Granulocytes Absolute, Automated 0.09 0.00 - 0.70 x10*3/uL    Lymphocytes Absolute 2.78 1.20 - 4.80 x10*3/uL    Monocytes Absolute 2.43 (H) 0.10 - 1.00 x10*3/uL    Eosinophils Absolute 0.07 0.00 - 0.70 x10*3/uL    Basophils Absolute 0.03 0.00 - 0.10 x10*3/uL   Comprehensive Metabolic Panel   Result Value Ref Range     Glucose 103 (H) 74 - 99 mg/dL    Sodium 131 (L) 136 - 145 mmol/L    Potassium 5.3 3.5 - 5.3 mmol/L    Chloride 95 (L) 98 - 107 mmol/L    Bicarbonate 27 21 - 32 mmol/L    Anion Gap 14 10 - 20 mmol/L    Urea Nitrogen 6 6 - 23 mg/dL    Creatinine 0.49 (L) 0.50 - 1.30 mg/dL    eGFR >90 >60 mL/min/1.73m*2    Calcium 9.3 8.6 - 10.6 mg/dL    Albumin 4.1 3.4 - 5.0 g/dL    Alkaline Phosphatase 226 (H) 33 - 120 U/L    Total Protein 6.7 6.4 - 8.2 g/dL    AST 67 (H) 9 - 39 U/L    Bilirubin, Total 6.1 (H) 0.0 - 1.2 mg/dL    ALT 46 10 - 52 U/L   Lactate Dehydrogenase   Result Value Ref Range     (H) 84 - 246 U/L   Reticulocytes   Result Value Ref Range    Retic % 7.0 (H) 0.5 - 2.0 %    Retic Absolute 0.236 (H) 0.022 - 0.118 x10*6/uL    Reticulocyte Hemoglobin 25 (L) 28 - 38 pg    Immature Retic fraction 14.7 <=16.0 %       Assessment/Plan   Principal Problem:    Sickle-cell disease with pain (CMS/HCC)  Active Problems:    Sickle cell crisis (CMS/HCC)    Joellen Narayan is a 23 y.o.Male PMH of newly diagnosed nodular lymphocyte predominant Hodgkins lymphoma (NLPHL) (on rituxan/prednisone, most recently received C2 2/8/24), 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 Of note, patient was just admitted for sickle cell pain management w/o complications (2/12-2/14). In ED, pt found to have mildly elevated temp 37.5 and leukocytosis (23.3k) higher than his baseline and atypical of his former sickle cell pain presentations. Of note, he was previously on prednisone for his lymphoma treatment (days 1-5) but last received on 2/13 and previous labs did not indicate such pronounced leukocytosis. CXR (2/16) w/o evidence of consolidation. COVID, Flu A/B (2/16)  negative. Resp viral panel pending. Blood cultures x2 (2/16-NGTD). Started on Cefepime (2/16-2/19) and Vanc (2/16-2/19), s/p Flagly once in ED. Endorsed some RLQ abdominal pain in ED, and elevated D-dimer 6,363. CT angio chest/abd/pelvis neg for PE but showed L > R GGOs c/f infectious process vs acute chest syndrome. No acute abd pathology, substantial improvement in lymphadenopathy. 2/17 Pt developed new O2 requirement to 3L, became febrile 38, had significant chest pain, and toxic ill appearance meeting criteria for ACS. Transfusion Med consulted for urgent RBC exchange and IR consulted for apheresis line placement, s/p line placement/ RBC exchange 2/17 evening. 2/19 stopped IV atbs d/t hallucinations with cefepime (allergy to ceftriaxone and cross-sensitivity), started Levaquin (2/19-2/21). s/p dPCA (2/17-2/19), transitioned to IVP dilaudid 2/19. Had a slight fever over night 2/19, repeat Bcx x2 NGTD, UA neg, continued Levaquin. Duplex LLE performed 2/20 d/t severe LLE pain which was negative for DVT. On exam 2/21, L calf extremely tender, hot, and very firm throughout. CT of the L knee/tibia and fibula ordered/pending to r/o any other process. Pt also had fever of 38.8 (2/21), Levaquin stopped and started Zosyn (2/21-). Repeat Bcx x2, UA, and CXR ordered. DC home pending improvement in pain and infectious process.     # Hgb SS with severe pain  # Acute Chest Syndrome   - Follows in  sickle cell clinic, last seen on 1/23/24  - [place]AR[place] reviewed, no aberrant behavior noted   - Not on any disease modifying medications (per outpatient note 1/23/24, was sent oxybryta rx for DMT but denied by insurance, must trial endari first, sent rx for endari but patient has yet to start it)  - Hgb baseline ~8.5, Hgb 8.5 (2/16) --> post RBC exchange Hgb 10 (2/18)--> Hgb 9.7 (2/21)  - Tbili baseline fluctuates ~5-13, Tbili 10.6 (2/18)--> 6.1 (2/21)  - LDH baseline fluctuates but ~500,  (2/18)--> 636 (2/20)--> 791 (2/21); will  monitor  - No current care path  - Mildly elevated temp in ED of 37.5 and leukocytosis of 23.3k with left shift (2/16), baseline ~13 with previous sickle cell pain admissions --> WBC (2/18) 11--> (2/21) 14.3  - s/p prednisone as part of chemo regimen on days 1-5 but last received 2/13 and labs did not indicate such pronounced leukocytosis at that time   - 2V CXR (2/16) w/o evidence of consolidation  - COVID, Flu A/B (2/16): negative  - Resp viral panel (2/16): negative  - s/p on admit started IV dilaudid 4mg q2hrs PRN severe pain (2/16-2/17); was writhing in pain and appeared in significant distress  - (2/17-2/19) s/p dPCA  - 2/19 started IV dilaudid 4mg q2hrs PRN severe pain, increased 2/20 to 4.5mg  - Increased IV dilaudid to 6mg q2hrs PRN severe pain (2/21) d/t severe uncontrolled pain  - s/p IVP Toradol 30mg q6hrs x3 days (2/16-2/19) with Protonix for PPI prophy  - Continue home folic acid 1mg daily  - Hgb S (2/16) 78.3%, 2/14 Hg S 80.3%, post-exchange Hgb S (2/19) 25.2%  - Utox (2/16) +MJ  - PO Zofran PRN for opioid-induced nausea, PO Benadryl PRN for opioid-induced pruritus  - Bowel regimen for opioid-induced constipation with DocuSenna 2tabs BID, Miralax daily, Lactulose BID PRN; LBM 2/21  - 2/16 CT PE showed negative PE but L > R basilar GGOs w/ more consolidative opacity within LLL c/f c/f infectious process vs acute chest syndrome  - Noted some RLQ abdominal pain in ED, and elevated D-dimer 6,363  - 2/16 CT A/P w/ contrast ordered to r/o PE and intra-abdominal pathology, showed no acute abdominopelvic abnormality, cholelithiasis, extensive adenopathy improved from prior PET 12/2023   - Amylase 15, lipase 4  - 2/17 AM Pt became febrile 38, developed new O2 requirement to 3L NC, reports chest pain, and has toxic ill appearance, pt briefly tachypneic 60s prior to PCA being started now improved, reports feel worse than his ACS episode from last Feb 2023   - ACS is defined as radiographic evidence of  consolidation plus fever > 38.5, New oxygen req, severe chest pain, Respiratory distress or new wheezing  - Both IR on-call attending and MICU consulted for urgent apheresis line placement, s/p apheresis line placement by IR in evening 2/17  - Transfusion Med consulted for urgent RBC exchange, s/p RBC exchange 2/17  - s/p Flagly once in ED, s/p Cefepime (2/16-2/19) and Vanc (2/16-2/19)   - 2/19 stopped IV atbs d/t hallucinations with cefepime (allergy to ceftriaxone and cross-sensitivity), started Levaquin (2/19-2/21)  - Blood cultures x2 (2/16 and 2/19 NGTD)  - Lactate level 1.7, Procal 0.82 (2/19)  - Duplex LLE (2/20) d/t severe LLE pain which was negative for DVT  - On exam 2/21, L calf extremely tender, hot, and very firm throughout. CT of the L knee/tibia and fibula ordered/pending to r/o any other process  - 2/21 fever of 38.8, Levaquin stopped and started Zosyn (2/21-). Repeat Bcx x2, UA, and CXR ordered/pending     # Nodular lymphocyte predominant Hodgkins lymphoma (NLPHL)   - Primary Oncologist: Dr. Stoll-- emailed/updated on admit 2/16  - Enlarged lymph nodes noted 4/1/22  - RUQ US (11/14/22) with mildly enlarged LNs in the region of the kavin hepatis  - MRI liver (11/16/22) showed re-demonstration of bulky retroperitoneal lymphadenopathy and kavin hepatic lymphadenopathy    - (11/18/22) lymph node biopsy showed atypical lymphoid infiltrate. Reviewed by Hemepath board, insufficient for lymphoma diagnosis  - PET/CT (12/6/22) showing retroperitoneal lymphadenopathy  - Followed up with Dr. Stoll (12/16/22) with plan for surg/onc consult for large tissue bx but patient missed apt and was lost to follow up  - Requested new apt with Dr. Ronnie Marte 6/19/23, patient was not seen and lost to follow up  - (11/28/23) CT A/P showed increased size of retroperitoneal lymph nodes reflecting extramedullary hematopoiesis I/s/o sickle cell vs lymphoma  - Paraaortic LN bx via IR (11/30/23) consistent with NLPHL. Flow: no  clonal B cell or T cell population identified, lymphocyte 95%, CD3+CD4+ 68%, CD3+CD8+ 7%, CD19+ 24%  - Elevated LDH/bili partially from sickle cell disease   - Chemotherapy (R-CHOP) was discussed with primary oncologist Dr. Stoll, and decision was made to simplify his chemotherapy to Rituxan and prednisone q3 weeks mainly due to frequent sickle cell crisis  - Current chemo regimen: rituxan and prednisone q3 weeks (C1 1/18/24, C2 2/8/24, C3 due 2/29/24)  - 2/16 started Acyclovir 400mg BID prophy per Dr. Stoll     # Hx of nocturnal oxygen dependence and hypoxia on room air  - Historically improves with oxygen supplementation  - No O2 needs on admission, SpO2 was stable  - Has not had home oxygen for 2-3 years   - Pt did not qualify for home O2 per walking pulse ox performed during previous hospital admission on 2/14/24  - 2/17 Developed new 3L O2 requirement, on RA as of 2/20 with stable SpO2  - s/p continuous pulse ox without acute events  - Has pulm apt on 2/21/24     DVT prophy: Lovenox subcutaneous, SCDs, encourage ambulation     DISPO:  - Full Code  -  DC home pending improvement in pain and infectious process  - FUV Gastro NPV and Pulm NPV on 2/21, Infusion for C3 ritux on 2/29. Sickle Cell FUV (Renee Barrera) on 3/20     I spent >60 minutes in the professional and overall care of this patient.     Assessment and plan discussed with attending physician Dr. Alex Jenkins, APRN-CNP

## 2024-02-21 NOTE — DOCUMENTATION CLARIFICATION NOTE
"    PATIENT:               AMARIS BLANCO  ACCT #:                  [id number]  MRN:                       [id number]  :                       2000  ADMIT DATE:       2024 11:29 AM  DISCH DATE:  RESPONDING PROVIDER #:        [id number]          PROVIDER RESPONSE TEXT:    Acute Hypoxemic Respiratory Failure    CDI QUERY TEXT:    [facility]_Respiratory Failure Acute      Instruction:    Based on your assessment of the patient and the clinical information, please provide the requested documentation by clicking on the appropriate radio button and enter any additional information if prompted.    Question: Is there a diagnosis indicative of the clinical information    When answering this query, please exercise your independent professional judgment. The fact that a question is being asked, does not imply that any particular answer is desired or expected.    The patient's clinical indicators include:  Clinical Information:  24 yo M with PMH of newly diagnosed nodular lymphocyte predominant Hodgkins lymphoma, HbSS sickle cell disease, and hx of nocturnal hypoxia not on O2 at home, did not qualify recently , presents for SC pain crisis/acute chest.    Clinical Indicators:  H/P  [id number]:  \"Pulmonary:  Effort: Respiratory distress present.\"    PN- Oncology  at 1132:  ?Pulmonary:  Comments: Diminished throughout all lung fields   Pt developed new O2 requirement to 3L, became febrile 38, had significant chest pain, and toxic ill appearance meeting criteria for ACS  # Hx of nocturnal oxygen dependence and hypoxia on room air  - Historically improves with oxygen supplementation  - No O2 needs on admission, SpO2 was stable  - Has not had home oxygen for 2-3 years  - Pt did not qualify for home O2 per walking pulse ox performed during previous hospital admission on 24  -  Developed new 3L O2 requirement, could begin weaning if tolerates   - c/w continuous pulse ox?    Vitals:   at 1130: RR 19, SpO2 98 " on RA; at 1652: RR 26, SpO2 95 on O2; at 1740: RR 19, SpO2 96 on 3LNC- P/F ratio 281; at 1842: R 22, SpO2 88 on RA    Treatment:  -supplemental O2 3LNC    Risk Factors:  -HbSS with Acute Chest  -hx of nocturnal hypoxia  Options provided:  -- Acute Hypoxemic Respiratory Failure  -- No acute respiratory failure  -- Other - I will add my own diagnosis  -- Refer to Clinical Documentation Reviewer    Query created by: Shilpi Andrew on 2/19/2024 3:16 PM      Electronically signed by:  ROSEANNA RODRIGUES 2/21/2024 9:46 AM

## 2024-02-22 LAB
ABO GROUP (TYPE) IN BLOOD: NORMAL
ALBUMIN SERPL BCP-MCNC: 3.8 G/DL (ref 3.4–5)
ALP SERPL-CCNC: 239 U/L (ref 33–120)
ALT SERPL W P-5'-P-CCNC: 42 U/L (ref 10–52)
ANION GAP SERPL CALC-SCNC: 15 MMOL/L (ref 10–20)
ANTIBODY SCREEN: NORMAL
AST SERPL W P-5'-P-CCNC: 46 U/L (ref 9–39)
BASOPHILS # BLD AUTO: 0.05 X10*3/UL (ref 0–0.1)
BASOPHILS NFR BLD AUTO: 0.4 %
BILIRUB SERPL-MCNC: 6.4 MG/DL (ref 0–1.2)
BUN SERPL-MCNC: 6 MG/DL (ref 6–23)
CALCIUM SERPL-MCNC: 9.2 MG/DL (ref 8.6–10.6)
CHLORIDE SERPL-SCNC: 94 MMOL/L (ref 98–107)
CO2 SERPL-SCNC: 26 MMOL/L (ref 21–32)
CREAT SERPL-MCNC: 0.62 MG/DL (ref 0.5–1.3)
EGFRCR SERPLBLD CKD-EPI 2021: >90 ML/MIN/1.73M*2
EOSINOPHIL # BLD AUTO: 0.12 X10*3/UL (ref 0–0.7)
EOSINOPHIL NFR BLD AUTO: 0.9 %
ERYTHROCYTE [DISTWIDTH] IN BLOOD BY AUTOMATED COUNT: 18.4 % (ref 11.5–14.5)
GLUCOSE SERPL-MCNC: 105 MG/DL (ref 74–99)
HCT VFR BLD AUTO: 29.8 % (ref 41–52)
HEMOGLOBIN A2: 3 % (ref 2–3.5)
HEMOGLOBIN A: 70.6 % (ref 95.8–98)
HEMOGLOBIN F: 1.2 % (ref 0–2)
HEMOGLOBIN IDENTIFICATION INTERPRETATION: ABNORMAL
HEMOGLOBIN S: 25.2 %
HGB BLD-MCNC: 9.6 G/DL (ref 13.5–17.5)
HGB RETIC QN: 25 PG (ref 28–38)
IMM GRANULOCYTES # BLD AUTO: 0.1 X10*3/UL (ref 0–0.7)
IMM GRANULOCYTES NFR BLD AUTO: 0.7 % (ref 0–0.9)
IMMATURE RETIC FRACTION: 17.1 %
LDH SERPL L TO P-CCNC: 586 U/L (ref 84–246)
LYMPHOCYTES # BLD AUTO: 2.29 X10*3/UL (ref 1.2–4.8)
LYMPHOCYTES NFR BLD AUTO: 16.3 %
MCH RBC QN AUTO: 28.5 PG (ref 26–34)
MCHC RBC AUTO-ENTMCNC: 32.2 G/DL (ref 32–36)
MCV RBC AUTO: 88 FL (ref 80–100)
MONOCYTES # BLD AUTO: 2.01 X10*3/UL (ref 0.1–1)
MONOCYTES NFR BLD AUTO: 14.3 %
NEUTROPHILS # BLD AUTO: 9.48 X10*3/UL (ref 1.2–7.7)
NEUTROPHILS NFR BLD AUTO: 67.4 %
NRBC BLD-RTO: 0.1 /100 WBCS (ref 0–0)
PATH REVIEW-HGB IDENTIFICATION: ABNORMAL
PLATELET # BLD AUTO: 224 X10*3/UL (ref 150–450)
POTASSIUM SERPL-SCNC: 4 MMOL/L (ref 3.5–5.3)
PROT SERPL-MCNC: 6.4 G/DL (ref 6.4–8.2)
RBC # BLD AUTO: 3.37 X10*6/UL (ref 4.5–5.9)
RETICS #: 0.18 X10*6/UL (ref 0.02–0.12)
RETICS/RBC NFR AUTO: 5.3 % (ref 0.5–2)
RH FACTOR (ANTIGEN D): NORMAL
SODIUM SERPL-SCNC: 131 MMOL/L (ref 136–145)
VANCOMYCIN SERPL-MCNC: 4.2 UG/ML (ref 5–20)
WBC # BLD AUTO: 14.1 X10*3/UL (ref 4.4–11.3)

## 2024-02-22 PROCEDURE — 2500000004 HC RX 250 GENERAL PHARMACY W/ HCPCS (ALT 636 FOR OP/ED)

## 2024-02-22 PROCEDURE — A4217 STERILE WATER/SALINE, 500 ML: HCPCS

## 2024-02-22 PROCEDURE — 85025 COMPLETE CBC W/AUTO DIFF WBC: CPT | Performed by: NURSE PRACTITIONER

## 2024-02-22 PROCEDURE — 80202 ASSAY OF VANCOMYCIN: CPT

## 2024-02-22 PROCEDURE — 2500000004 HC RX 250 GENERAL PHARMACY W/ HCPCS (ALT 636 FOR OP/ED): Performed by: NURSE PRACTITIONER

## 2024-02-22 PROCEDURE — 85045 AUTOMATED RETICULOCYTE COUNT: CPT | Performed by: NURSE PRACTITIONER

## 2024-02-22 PROCEDURE — 80053 COMPREHEN METABOLIC PANEL: CPT | Performed by: NURSE PRACTITIONER

## 2024-02-22 PROCEDURE — 86901 BLOOD TYPING SEROLOGIC RH(D): CPT | Performed by: NURSE PRACTITIONER

## 2024-02-22 PROCEDURE — 2500000002 HC RX 250 W HCPCS SELF ADMINISTERED DRUGS (ALT 637 FOR MEDICARE OP, ALT 636 FOR OP/ED): Performed by: NURSE PRACTITIONER

## 2024-02-22 PROCEDURE — 2500000001 HC RX 250 WO HCPCS SELF ADMINISTERED DRUGS (ALT 637 FOR MEDICARE OP): Performed by: NURSE PRACTITIONER

## 2024-02-22 PROCEDURE — 83615 LACTATE (LD) (LDH) ENZYME: CPT | Performed by: NURSE PRACTITIONER

## 2024-02-22 PROCEDURE — 2500000001 HC RX 250 WO HCPCS SELF ADMINISTERED DRUGS (ALT 637 FOR MEDICARE OP)

## 2024-02-22 PROCEDURE — 2500000005 HC RX 250 GENERAL PHARMACY W/O HCPCS

## 2024-02-22 PROCEDURE — 99233 SBSQ HOSP IP/OBS HIGH 50: CPT

## 2024-02-22 PROCEDURE — 1170000001 HC PRIVATE ONCOLOGY ROOM DAILY

## 2024-02-22 RX ADMIN — PIPERACILLIN SODIUM AND TAZOBACTAM SODIUM 3.38 G: 3; .375 INJECTION, SOLUTION INTRAVENOUS at 18:27

## 2024-02-22 RX ADMIN — GABAPENTIN 300 MG: 300 CAPSULE ORAL at 21:00

## 2024-02-22 RX ADMIN — FOLIC ACID 1 MG: 1 TABLET ORAL at 08:36

## 2024-02-22 RX ADMIN — GABAPENTIN 300 MG: 300 CAPSULE ORAL at 14:09

## 2024-02-22 RX ADMIN — POLYETHYLENE GLYCOL 3350 17 G: 17 POWDER, FOR SOLUTION ORAL at 08:36

## 2024-02-22 RX ADMIN — GABAPENTIN 300 MG: 300 CAPSULE ORAL at 05:00

## 2024-02-22 RX ADMIN — HYDROMORPHONE HYDROCHLORIDE 4 MG: 2 INJECTION, SOLUTION INTRAMUSCULAR; INTRAVENOUS; SUBCUTANEOUS at 12:42

## 2024-02-22 RX ADMIN — PANTOPRAZOLE SODIUM 40 MG: 40 TABLET, DELAYED RELEASE ORAL at 06:30

## 2024-02-22 RX ADMIN — ACYCLOVIR 400 MG: 400 TABLET ORAL at 21:00

## 2024-02-22 RX ADMIN — HYDROMORPHONE HYDROCHLORIDE 4 MG: 2 INJECTION, SOLUTION INTRAMUSCULAR; INTRAVENOUS; SUBCUTANEOUS at 21:06

## 2024-02-22 RX ADMIN — HYDROMORPHONE HYDROCHLORIDE 6 MG: 2 INJECTION, SOLUTION INTRAMUSCULAR; INTRAVENOUS; SUBCUTANEOUS at 06:30

## 2024-02-22 RX ADMIN — HYDROMORPHONE HYDROCHLORIDE 4 MG: 2 INJECTION, SOLUTION INTRAMUSCULAR; INTRAVENOUS; SUBCUTANEOUS at 17:01

## 2024-02-22 RX ADMIN — HYDROMORPHONE HYDROCHLORIDE 6 MG: 2 INJECTION, SOLUTION INTRAMUSCULAR; INTRAVENOUS; SUBCUTANEOUS at 02:16

## 2024-02-22 RX ADMIN — VANCOMYCIN HYDROCHLORIDE 1500 MG: 5 INJECTION, POWDER, LYOPHILIZED, FOR SOLUTION INTRAVENOUS at 14:09

## 2024-02-22 RX ADMIN — HYDROMORPHONE HYDROCHLORIDE 6 MG: 2 INJECTION, SOLUTION INTRAMUSCULAR; INTRAVENOUS; SUBCUTANEOUS at 00:15

## 2024-02-22 RX ADMIN — HYDROMORPHONE HYDROCHLORIDE 6 MG: 2 INJECTION, SOLUTION INTRAMUSCULAR; INTRAVENOUS; SUBCUTANEOUS at 08:36

## 2024-02-22 RX ADMIN — PIPERACILLIN SODIUM AND TAZOBACTAM SODIUM 3.38 G: 3; .375 INJECTION, SOLUTION INTRAVENOUS at 06:30

## 2024-02-22 RX ADMIN — HYDROMORPHONE HYDROCHLORIDE 6 MG: 2 INJECTION, SOLUTION INTRAMUSCULAR; INTRAVENOUS; SUBCUTANEOUS at 10:40

## 2024-02-22 RX ADMIN — VANCOMYCIN HYDROCHLORIDE 1250 MG: 5 INJECTION, POWDER, LYOPHILIZED, FOR SOLUTION INTRAVENOUS at 05:00

## 2024-02-22 RX ADMIN — HYDROMORPHONE HYDROCHLORIDE 4 MG: 2 INJECTION, SOLUTION INTRAMUSCULAR; INTRAVENOUS; SUBCUTANEOUS at 23:07

## 2024-02-22 RX ADMIN — LIDOCAINE 1 PATCH: 4 PATCH TOPICAL at 08:37

## 2024-02-22 RX ADMIN — PIPERACILLIN SODIUM AND TAZOBACTAM SODIUM 3.38 G: 3; .375 INJECTION, SOLUTION INTRAVENOUS at 11:59

## 2024-02-22 RX ADMIN — ALLOPURINOL 300 MG: 300 TABLET ORAL at 08:36

## 2024-02-22 RX ADMIN — VANCOMYCIN HYDROCHLORIDE 1500 MG: 5 INJECTION, POWDER, LYOPHILIZED, FOR SOLUTION INTRAVENOUS at 21:00

## 2024-02-22 RX ADMIN — HYDROMORPHONE HYDROCHLORIDE 6 MG: 2 INJECTION, SOLUTION INTRAMUSCULAR; INTRAVENOUS; SUBCUTANEOUS at 04:26

## 2024-02-22 RX ADMIN — ACYCLOVIR 400 MG: 400 TABLET ORAL at 08:36

## 2024-02-22 RX ADMIN — HYDROMORPHONE HYDROCHLORIDE 4 MG: 2 INJECTION, SOLUTION INTRAMUSCULAR; INTRAVENOUS; SUBCUTANEOUS at 19:03

## 2024-02-22 RX ADMIN — SENNOSIDES AND DOCUSATE SODIUM 2 TABLET: 8.6; 5 TABLET ORAL at 08:36

## 2024-02-22 RX ADMIN — HYDROMORPHONE HYDROCHLORIDE 4 MG: 2 INJECTION, SOLUTION INTRAMUSCULAR; INTRAVENOUS; SUBCUTANEOUS at 14:55

## 2024-02-22 RX ADMIN — ENOXAPARIN SODIUM 40 MG: 100 INJECTION SUBCUTANEOUS at 21:00

## 2024-02-22 ASSESSMENT — PAIN SCALES - GENERAL
PAINLEVEL_OUTOF10: 8
PAINLEVEL_OUTOF10: 7
PAINLEVEL_OUTOF10: 8
PAINLEVEL_OUTOF10: 10 - WORST POSSIBLE PAIN
PAINLEVEL_OUTOF10: 7
PAINLEVEL_OUTOF10: 7
PAINLEVEL_OUTOF10: 8
PAINLEVEL_OUTOF10: 8
PAINLEVEL_OUTOF10: 7
PAINLEVEL_OUTOF10: 7
PAINLEVEL_OUTOF10: 8
PAINLEVEL_OUTOF10: 7
PAINLEVEL_OUTOF10: 8

## 2024-02-22 ASSESSMENT — PAIN - FUNCTIONAL ASSESSMENT

## 2024-02-22 ASSESSMENT — COGNITIVE AND FUNCTIONAL STATUS - GENERAL
HELP NEEDED FOR BATHING: A LITTLE
STANDING UP FROM CHAIR USING ARMS: A LITTLE
DRESSING REGULAR LOWER BODY CLOTHING: A LITTLE
DRESSING REGULAR UPPER BODY CLOTHING: A LITTLE
MOBILITY SCORE: 20
MOVING TO AND FROM BED TO CHAIR: A LITTLE
WALKING IN HOSPITAL ROOM: A LITTLE
CLIMB 3 TO 5 STEPS WITH RAILING: A LITTLE
DAILY ACTIVITIY SCORE: 21

## 2024-02-22 ASSESSMENT — PAIN DESCRIPTION - LOCATION: LOCATION: LEG

## 2024-02-22 NOTE — PROGRESS NOTES
"Vancomycin Dosing by Pharmacy- FOLLOW UP    Joellen Narayan is a 23 y.o. year old male who Pharmacy has been consulted for vancomycin dosing for cellulitis, skin and soft tissue. Based on the patient's indication and renal status this patient is being dosed based on a goal AUC of 400-600.     Renal function is currently stable.    Current vancomycin dose: 1250 mg given every 12 hours    Estimated vancomycin AUC on current dose: 390 mg/L.hr     Visit Vitals  /76 (BP Location: Right arm, Patient Position: Lying)   Pulse 99   Temp 35.9 °C (96.6 °F) (Temporal)   Resp 18        Lab Results   Component Value Date    CREATININE 0.62 02/22/2024    CREATININE 0.49 (L) 02/21/2024    CREATININE 0.43 (L) 02/20/2024    CREATININE 0.48 (L) 02/19/2024        Patient weight is No results found for: \"PTWEIGHT\"    No results found for: \"CULTURE\"     I/O last 3 completed shifts:  In: 1900 (26.2 mL/kg) [P.O.:1250; IV Piggyback:650]  Out: 3150 (43.4 mL/kg) [Urine:3150 (1.2 mL/kg/hr)]  Weight: 72.6 kg   [unfilled]    Lab Results   Component Value Date    PATIENTTEMP 37.0 03/17/2023    PATIENTTEMP 37.0 11/30/2022        Assessment/Plan    Below AUC goal. Will increase to 1500mg Q8hr.     This dosing regimen is predicted by [employer] to result in the following pharmacokinetic parameters:  Regimen: 1500 mg IV every 8 hours.  Start time: 13:00 on 02/22/2024  Exposure target: AUC24 (range)400-600 mg/L.hr   AUC24,ss: 468 mg/L.hr  Probability of AUC24 > 400: 80 %  Ctrough,ss: 7.9 mg/L  Probability of Ctrough,ss > 20: 0 %  Probability of nephrotoxicity (Lodise THOMAS 2009): 5 %    The next level will be obtained on 2/23 with AM labs. May be obtained sooner if clinically indicated.   Will continue to monitor renal function daily while on vancomycin and order serum creatinine at least every 48 hours if not already ordered.  Follow for continued vancomycin needs, clinical response, and signs/symptoms of toxicity.       Lacho Horta, " PharmD, BCPS

## 2024-02-22 NOTE — PROGRESS NOTES
Spiritual Care Visit    Clinical Encounter Type  Visited With: Patient  Routine Visit: Follow-up  Continue Visiting: Yes    Taxonomy  Intended Effects: Build relationship of care and support, Demonstrate caring and concern, Luz Maria affirmation  Methods: Encourage self reflection, Explore luz maria and values, Offer support  Interventions: Acknowledge current situation, Active listening, Discuss spirituality/Scientology with someone, [place], Provide spiritual/Latter day resources     visited with patient Joellen Narayan. Patient reflected on his week, of feeling that this is one of the worst pain crises he's had. Patient expressed gratitude for friends and family who have either visited or reached out to him to check on him. Patient shared he has been reading the Bible during his admission and shared verses that have resonated with him.  offered a couple of books, which patient was receptive to and expressed appreciation for.  provided support through compassionate presence, reflective listening, supportive conversation, and prayer. Patient was appreciative of visit and did not have any further needs at this time.  will provide ongoing support and Spiritual Care remains available as needed/requested.    Rev. Diana Álvarez MDiv, BCC

## 2024-02-22 NOTE — PROGRESS NOTES
"Joellen Narayan is a 23 y.o. male on day 5 of admission presenting with Sickle-cell disease with pain (CMS/HCC).    Subjective   Joellen is doing fine this morning. We discussed that his CT leg showed possible cellulitis, we discussed that IV abx were started for him yesterday. Pt states his pain has improved today, and he would like to try weaning his pain meds. Denies any fevers/chills, SOB, or CP, abdominal pain. Last BM 2/22.    Objective     Physical Exam  HENT:      Head: Normocephalic.      Right Ear: External ear normal.      Left Ear: External ear normal.      Nose: Nose normal.   Eyes:      General: Scleral icterus present.      Extraocular Movements: Extraocular movements intact.      Pupils: Pupils are equal, round, and reactive to light.   Cardiovascular:      Rate and Rhythm: Regular rhythm.   Pulmonary:      Comments: Diminished throughout all lung fields  Abdominal:      General: Abdomen is flat. Bowel sounds are normal.      Palpations: Abdomen is soft.   Musculoskeletal:      Cervical back: Normal range of motion and neck supple.      Comments: L calf extremely tender, hot, and very firm throughout  Generalized weakness   Skin:     General: Skin is warm and dry.   Neurological:      General: No focal deficit present.      Mental Status: He is alert and oriented to person, place, and time. Mental status is at baseline.   Psychiatric:         Mood and Affect: Mood normal.         Behavior: Behavior normal.         Thought Content: Thought content normal.         Judgment: Judgment normal.       Last Recorded Vitals  Blood pressure 132/76, pulse 105, temperature 37.3 °C (99.1 °F), temperature source Temporal, resp. rate 18, height 1.854 m (6' 1\"), weight 72.6 kg (160 lb), SpO2 96 %.  Intake/Output last 3 Shifts:  I/O last 3 completed shifts:  In: 1900 (26.2 mL/kg) [P.O.:1250; IV Piggyback:650]  Out: 3150 (43.4 mL/kg) [Urine:3150 (1.2 mL/kg/hr)]  Weight: 72.6 kg     Relevant Results        This patient " has a central line   Reason for the central line remaining today?  Blood draws, infusions     .Scheduled medications  acyclovir, 400 mg, oral, q12h REYNALDO  allopurinol, 300 mg, oral, Daily  diclofenac sodium, 4 g, Topical, 4x daily  enoxaparin, 40 mg, subcutaneous, Daily  folic acid, 1 mg, oral, Daily  gabapentin, 300 mg, oral, q8h REYNALDO  HYDROmorphone, 0.6 mg, intravenous, Once  lidocaine, 1 patch, transdermal, Daily  pantoprazole, 40 mg, oral, Daily before breakfast  piperacillin-tazobactam, 3.375 g, intravenous, q6h  polyethylene glycol, 17 g, oral, Daily  sennosides-docusate sodium, 2 tablet, oral, BID  vancomycin, 1,500 mg, intravenous, q8h      Continuous medications       PRN medications  PRN medications: acetaminophen, albuterol, diphenhydrAMINE, HYDROmorphone, lactulose, naloxone, ondansetron    .  Results for orders placed or performed during the hospital encounter of 02/16/24 (from the past 24 hour(s))   CBC and Auto Differential   Result Value Ref Range    WBC 14.1 (H) 4.4 - 11.3 x10*3/uL    nRBC 0.1 (H) 0.0 - 0.0 /100 WBCs    RBC 3.37 (L) 4.50 - 5.90 x10*6/uL    Hemoglobin 9.6 (L) 13.5 - 17.5 g/dL    Hematocrit 29.8 (L) 41.0 - 52.0 %    MCV 88 80 - 100 fL    MCH 28.5 26.0 - 34.0 pg    MCHC 32.2 32.0 - 36.0 g/dL    RDW 18.4 (H) 11.5 - 14.5 %    Platelets 224 150 - 450 x10*3/uL    Neutrophils % 67.4 40.0 - 80.0 %    Immature Granulocytes %, Automated 0.7 0.0 - 0.9 %    Lymphocytes % 16.3 13.0 - 44.0 %    Monocytes % 14.3 2.0 - 10.0 %    Eosinophils % 0.9 0.0 - 6.0 %    Basophils % 0.4 0.0 - 2.0 %    Neutrophils Absolute 9.48 (H) 1.20 - 7.70 x10*3/uL    Immature Granulocytes Absolute, Automated 0.10 0.00 - 0.70 x10*3/uL    Lymphocytes Absolute 2.29 1.20 - 4.80 x10*3/uL    Monocytes Absolute 2.01 (H) 0.10 - 1.00 x10*3/uL    Eosinophils Absolute 0.12 0.00 - 0.70 x10*3/uL    Basophils Absolute 0.05 0.00 - 0.10 x10*3/uL   Comprehensive Metabolic Panel   Result Value Ref Range    Glucose 105 (H) 74 - 99 mg/dL     Sodium 131 (L) 136 - 145 mmol/L    Potassium 4.0 3.5 - 5.3 mmol/L    Chloride 94 (L) 98 - 107 mmol/L    Bicarbonate 26 21 - 32 mmol/L    Anion Gap 15 10 - 20 mmol/L    Urea Nitrogen 6 6 - 23 mg/dL    Creatinine 0.62 0.50 - 1.30 mg/dL    eGFR >90 >60 mL/min/1.73m*2    Calcium 9.2 8.6 - 10.6 mg/dL    Albumin 3.8 3.4 - 5.0 g/dL    Alkaline Phosphatase 239 (H) 33 - 120 U/L    Total Protein 6.4 6.4 - 8.2 g/dL    AST 46 (H) 9 - 39 U/L    Bilirubin, Total 6.4 (H) 0.0 - 1.2 mg/dL    ALT 42 10 - 52 U/L   Lactate Dehydrogenase   Result Value Ref Range     (H) 84 - 246 U/L   Reticulocytes   Result Value Ref Range    Retic % 5.3 (H) 0.5 - 2.0 %    Retic Absolute 0.179 (H) 0.022 - 0.118 x10*6/uL    Reticulocyte Hemoglobin 25 (L) 28 - 38 pg    Immature Retic fraction 17.1 (H) <=16.0 %   Vancomycin   Result Value Ref Range    Vancomycin 4.2 (L) 5.0 - 20.0 ug/mL       Assessment/Plan   Principal Problem:    Sickle-cell disease with pain (CMS/HCC)  Active Problems:    Sickle cell crisis (CMS/HCC)    Joellen Narayan is a 23 y.o.Male PMH of newly diagnosed nodular lymphocyte predominant Hodgkins lymphoma (NLPHL) (on rituxan/prednisone, most recently received C2 2/8/24), 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 Of note, patient was just admitted for sickle cell pain management w/o complications (2/12-2/14). In ED, pt found to have mildly elevated temp 37.5 and leukocytosis (23.3k) higher than his baseline and atypical of his former sickle cell pain presentations. Of note, he was previously on prednisone for his lymphoma treatment (days 1-5) but last received on 2/13 and previous labs did not indicate such pronounced leukocytosis. CXR (2/16) w/o  evidence of consolidation. COVID, Flu A/B (2/16) negative. Resp viral panel pending. Blood cultures x2 (2/16-NGTD). Started on Cefepime (2/16-2/19) and Vanc (2/16-2/19), s/p Flagly once in ED. Endorsed some RLQ abdominal pain in ED, and elevated D-dimer 6,363. CT angio chest/abd/pelvis neg for PE but showed L > R GGOs c/f infectious process vs acute chest syndrome. No acute abd pathology, substantial improvement in lymphadenopathy. 2/17 Pt developed new O2 requirement to 3L, became febrile 38, had significant chest pain, and toxic ill appearance meeting criteria for ACS. Transfusion Med consulted for urgent RBC exchange and IR consulted for apheresis line placement, s/p line placement/ RBC exchange 2/17 evening. 2/19 stopped IV atbs d/t hallucinations with cefepime (allergy to ceftriaxone and cross-sensitivity), started Levaquin (2/19-2/21). s/p dPCA (2/17-2/19), transitioned to IVP dilaudid 2/19. Had a slight fever over night 2/19, repeat Bcx x2 NGTD, UA neg, continued Levaquin. Duplex LLE performed 2/20 d/t severe LLE pain which was negative for DVT. On exam 2/21, L calf extremely tender, hot, and very firm throughout. CT of the L knee/tibia and fibula ordered/pending to r/o any other process. Pt also had fever of 38.8 (2/21), Levaquin stopped and started Zosyn (2/21-). Repeat Bcx x2, UA, and CXR ordered. CT L knee/tibia showed poss cellulitis, (2/21-) started IV vancomycin. DC home pending improvement in pain and infectious process.     # Hgb SS with severe pain  # Acute Chest Syndrome   - Follows in  sickle cell clinic, last seen on 1/23/24  - [place]AR[place] reviewed, no aberrant behavior noted   - Not on any disease modifying medications (per outpatient note 1/23/24, was sent oxybryta rx for DMT but denied by insurance, must trial endari first, sent rx for endari but patient has yet to start it)  - Hgb baseline ~8.5, Hgb 8.5 (2/16) --> post RBC exchange Hgb 10 (2/18)--> Hgb 9.7 (2/21)  - Tbili baseline fluctuates  ~5-13, Tbili 10.6 (2/18)--> 6.1 (2/21)  - LDH baseline fluctuates but ~500,  (2/18)--> 636 (2/20)--> 791 (2/21); will monitor  - No current care path  - Mildly elevated temp in ED of 37.5 and leukocytosis of 23.3k with left shift (2/16), baseline ~13 with previous sickle cell pain admissions --> WBC (2/18) 11--> (2/21) 14.3  - s/p prednisone as part of chemo regimen on days 1-5 but last received 2/13 and labs did not indicate such pronounced leukocytosis at that time   - 2V CXR (2/16) w/o evidence of consolidation  - COVID, Flu A/B (2/16): negative  - Resp viral panel (2/16): negative  - s/p on admit started IV dilaudid 4mg q2hrs PRN severe pain (2/16-2/17); was writhing in pain and appeared in significant distress  - (2/17-2/19) s/p dPCA  - 2/19 started IV dilaudid 4mg q2hrs PRN severe pain, increased 2/20 to 4.5mg  - s/p increased IV dilaudid to 6mg q2hrs PRN severe pain (2/21) d/t severe uncontrolled pain  - (2/22-current) start IV dilaudid 4mg q2h PRN for pain  - s/p IVP Toradol 30mg q6hrs x3 days (2/16-2/19) with Protonix for PPI prophy  - Continue home folic acid 1mg daily  - Hgb S (2/16) 78.3%, 2/14 Hg S 80.3%, post-exchange Hgb S (2/19) 25.2%  - Utox (2/16) +MJ  - PO Zofran PRN for opioid-induced nausea, PO Benadryl PRN for opioid-induced pruritus  - Bowel regimen for opioid-induced constipation with DocuSenna 2tabs BID, Miralax daily, Lactulose BID PRN; LBM 2/21  - 2/16 CT PE showed negative PE but L > R basilar GGOs w/ more consolidative opacity within LLL c/f c/f infectious process vs acute chest syndrome  - Noted some RLQ abdominal pain in ED, and elevated D-dimer 6,363  - 2/16 CT A/P w/ contrast ordered to r/o PE and intra-abdominal pathology, showed no acute abdominopelvic abnormality, cholelithiasis, extensive adenopathy improved from prior PET 12/2023   - Amylase 15, lipase 4  - 2/17 AM Pt became febrile 38, developed new O2 requirement to 3L NC, reports chest pain, and has toxic ill  appearance, pt briefly tachypneic 60s prior to PCA being started now improved, reports feel worse than his ACS episode from last Feb 2023   - ACS is defined as radiographic evidence of consolidation plus fever > 38.5, New oxygen req, severe chest pain, Respiratory distress or new wheezing  - Both IR on-call attending and MICU consulted for urgent apheresis line placement, s/p apheresis line placement by IR in evening 2/17  - Transfusion Med consulted for urgent RBC exchange, s/p RBC exchange 2/17  - s/p Flagly once in ED, s/p Cefepime (2/16-2/19) and Vanc (2/16-2/19)   - 2/19 stopped IV atbs d/t hallucinations with cefepime (allergy to ceftriaxone and cross-sensitivity), started Levaquin (2/19-2/21)  - Blood cultures x2 (2/16 and 2/19 NGTD)  - Lactate level 1.7, Procal 0.82 (2/19)  - Duplex LLE (2/20) d/t severe LLE pain which was negative for DVT  - On exam 2/21, L calf extremely tender, hot, and very firm throughout. CT of the L knee/tibia and fibula ordered/pending to r/o any other process  - 2/21 fever of 38.8, Levaquin stopped and started Zosyn (2/21-current). Repeat Bcx x2, UA, and CXR ordered  - CXR (2/21) neg for acute process, UA (2/21) neg  - Blood cultures x2 (2/21) pending  - CT L knee/tibia (2/21) showed great saphenous thrombophlebitis vs cellulitis  - Encourage leg elevation and compression stocking as tolerated  - (2/21-current) started IV vancomycin RPH to dose     # Nodular lymphocyte predominant Hodgkins lymphoma (NLPHL)   - Primary Oncologist: Dr. Stoll-- emailed/updated on admit 2/16  - Enlarged lymph nodes noted 4/1/22  - RUQ US (11/14/22) with mildly enlarged LNs in the region of the kavin hepatis  - MRI liver (11/16/22) showed re-demonstration of bulky retroperitoneal lymphadenopathy and kavin hepatic lymphadenopathy    - (11/18/22) lymph node biopsy showed atypical lymphoid infiltrate. Reviewed by Hemepath board, insufficient for lymphoma diagnosis  - PET/CT (12/6/22) showing  retroperitoneal lymphadenopathy  - Followed up with Dr. Stoll (12/16/22) with plan for surg/onc consult for large tissue bx but patient missed apt and was lost to follow up  - Requested new apt with Dr. Ronnie Marte 6/19/23, patient was not seen and lost to follow up  - (11/28/23) CT A/P showed increased size of retroperitoneal lymph nodes reflecting extramedullary hematopoiesis I/s/o sickle cell vs lymphoma  - Paraaortic LN bx via IR (11/30/23) consistent with NLPHL. Flow: no clonal B cell or T cell population identified, lymphocyte 95%, CD3+CD4+ 68%, CD3+CD8+ 7%, CD19+ 24%  - Elevated LDH/bili partially from sickle cell disease   - Chemotherapy (R-[facility]) was discussed with primary oncologist Dr. Stoll, and decision was made to simplify his chemotherapy to Rituxan and prednisone q3 weeks mainly due to frequent sickle cell crisis  - Current chemo regimen: rituxan and prednisone q3 weeks (C1 1/18/24, C2 2/8/24, C3 due 2/29/24)  - 2/16 started Acyclovir 400mg BID prophy per Dr. Stoll     # Hx of nocturnal oxygen dependence and hypoxia on room air  - Historically improves with oxygen supplementation  - No O2 needs on admission, SpO2 was stable  - Has not had home oxygen for 2-3 years   - Pt did not qualify for home O2 per walking pulse ox performed during previous hospital admission on 2/14/24  - 2/17 Developed new 3L O2 requirement, on RA as of 2/20 with stable SpO2  - s/p continuous pulse ox without acute events  - Has pulm apt on 2/21/24     DVT prophy: Lovenox subcutaneous, SCDs, encourage ambulation     DISPO:  - Full Code  -  DC home pending improvement in pain and infectious process  - FUV Gastro NPV and Pulm NPV on 2/21, Infusion for C3 ritux on 2/29. Sickle Cell FUV (Renee Barrera) on 3/20     I spent >60 minutes in the professional and overall care of this patient.     Assessment and plan discussed with attending physician Dr. Alex Couch PA-C

## 2024-02-22 NOTE — CARE PLAN
The patient's goals for the shift include      The clinical goals for the shift include pt will remain safe and free from injury through shift      Problem: Safety  Goal: Patient will be injury free during hospitalization  Outcome: Progressing     Problem: Daily Care  Goal: Daily care needs are met  Outcome: Progressing     Problem: Psychosocial Needs  Goal: Demonstrates ability to cope with hospitalization/illness  Outcome: Progressing

## 2024-02-23 LAB
1OH-MIDAZOLAM UR CFM-MCNC: <25 NG/ML
6MAM UR CFM-MCNC: <25 NG/ML
7AMINOCLONAZEPAM UR CFM-MCNC: <25 NG/ML
A-OH ALPRAZ UR CFM-MCNC: <25 NG/ML
ALBUMIN SERPL BCP-MCNC: 3.5 G/DL (ref 3.4–5)
ALP SERPL-CCNC: 303 U/L (ref 33–120)
ALPRAZ UR CFM-MCNC: <25 NG/ML
ALT SERPL W P-5'-P-CCNC: 81 U/L (ref 10–52)
ANION GAP SERPL CALC-SCNC: 18 MMOL/L (ref 10–20)
AST SERPL W P-5'-P-CCNC: 107 U/L (ref 9–39)
BACTERIA BLD CULT: NORMAL
BACTERIA BLD CULT: NORMAL
BASOPHILS # BLD AUTO: 0.04 X10*3/UL (ref 0–0.1)
BASOPHILS NFR BLD AUTO: 0.4 %
BILIRUB SERPL-MCNC: 6.1 MG/DL (ref 0–1.2)
BUN SERPL-MCNC: 5 MG/DL (ref 6–23)
CALCIUM SERPL-MCNC: 8.9 MG/DL (ref 8.6–10.6)
CARBOXYTHC UR-MCNC: >500 NG/ML
CHLORDIAZEP UR CFM-MCNC: <25 NG/ML
CHLORIDE SERPL-SCNC: 95 MMOL/L (ref 98–107)
CLONAZEPAM UR CFM-MCNC: <25 NG/ML
CO2 SERPL-SCNC: 25 MMOL/L (ref 21–32)
CODEINE UR CFM-MCNC: <50 NG/ML
CREAT SERPL-MCNC: 0.61 MG/DL (ref 0.5–1.3)
DIAZEPAM UR CFM-MCNC: <25 NG/ML
EDDP UR CFM-MCNC: <25 NG/ML
EGFRCR SERPLBLD CKD-EPI 2021: >90 ML/MIN/1.73M*2
EOSINOPHIL # BLD AUTO: 0.48 X10*3/UL (ref 0–0.7)
EOSINOPHIL NFR BLD AUTO: 4.3 %
ERYTHROCYTE [DISTWIDTH] IN BLOOD BY AUTOMATED COUNT: 18.8 % (ref 11.5–14.5)
FENTANYL UR CFM-MCNC: <2.5 NG/ML
GLUCOSE SERPL-MCNC: 118 MG/DL (ref 74–99)
HCT VFR BLD AUTO: 28.6 % (ref 41–52)
HGB BLD-MCNC: 8.9 G/DL (ref 13.5–17.5)
HGB RETIC QN: 26 PG (ref 28–38)
HYDROCODONE CTO UR CFM-MCNC: <25 NG/ML
HYDROMORPHONE UR CFM-MCNC: >2500 NG/ML
IMM GRANULOCYTES # BLD AUTO: 0.06 X10*3/UL (ref 0–0.7)
IMM GRANULOCYTES NFR BLD AUTO: 0.5 % (ref 0–0.9)
IMMATURE RETIC FRACTION: 35.1 %
LDH SERPL L TO P-CCNC: 484 U/L (ref 84–246)
LORAZEPAM UR CFM-MCNC: <25 NG/ML
LYMPHOCYTES # BLD AUTO: 2 X10*3/UL (ref 1.2–4.8)
LYMPHOCYTES NFR BLD AUTO: 17.7 %
MCH RBC QN AUTO: 28.1 PG (ref 26–34)
MCHC RBC AUTO-ENTMCNC: 31.1 G/DL (ref 32–36)
MCV RBC AUTO: 90 FL (ref 80–100)
METHADONE UR CFM-MCNC: <25 NG/ML
MIDAZOLAM UR CFM-MCNC: <25 NG/ML
MONOCYTES # BLD AUTO: 1.35 X10*3/UL (ref 0.1–1)
MONOCYTES NFR BLD AUTO: 12 %
MORPHINE UR CFM-MCNC: <50 NG/ML
NEUTROPHILS # BLD AUTO: 7.34 X10*3/UL (ref 1.2–7.7)
NEUTROPHILS NFR BLD AUTO: 65.1 %
NORDIAZEPAM UR CFM-MCNC: <25 NG/ML
NORFENTANYL UR CFM-MCNC: <2.5 NG/ML
NORHYDROCODONE UR CFM-MCNC: <25 NG/ML
NOROXYCODONE UR CFM-MCNC: >1000 NG/ML
NORTRAMADOL UR-MCNC: <50 NG/ML
NRBC BLD-RTO: 0.3 /100 WBCS (ref 0–0)
OXAZEPAM UR CFM-MCNC: <25 NG/ML
OXYCODONE UR CFM-MCNC: 640 NG/ML
OXYMORPHONE UR CFM-MCNC: 1186 NG/ML
PLATELET # BLD AUTO: 295 X10*3/UL (ref 150–450)
POTASSIUM SERPL-SCNC: 3.8 MMOL/L (ref 3.5–5.3)
PROT SERPL-MCNC: 5.8 G/DL (ref 6.4–8.2)
RBC # BLD AUTO: 3.17 X10*6/UL (ref 4.5–5.9)
RETICS #: 0.17 X10*6/UL (ref 0.02–0.12)
RETICS/RBC NFR AUTO: 5.4 % (ref 0.5–2)
SODIUM SERPL-SCNC: 134 MMOL/L (ref 136–145)
TEMAZEPAM UR CFM-MCNC: <25 NG/ML
TRAMADOL UR CFM-MCNC: <50 NG/ML
VANCOMYCIN SERPL-MCNC: 32.8 UG/ML (ref 5–20)
VANCOMYCIN SERPL-MCNC: 9.2 UG/ML (ref 5–20)
WBC # BLD AUTO: 11.3 X10*3/UL (ref 4.4–11.3)
ZOLPIDEM UR CFM-MCNC: <25 NG/ML
ZOLPIDEM UR-MCNC: <25 NG/ML

## 2024-02-23 PROCEDURE — 2500000004 HC RX 250 GENERAL PHARMACY W/ HCPCS (ALT 636 FOR OP/ED)

## 2024-02-23 PROCEDURE — 2500000002 HC RX 250 W HCPCS SELF ADMINISTERED DRUGS (ALT 637 FOR MEDICARE OP, ALT 636 FOR OP/ED): Performed by: NURSE PRACTITIONER

## 2024-02-23 PROCEDURE — 80053 COMPREHEN METABOLIC PANEL: CPT | Performed by: NURSE PRACTITIONER

## 2024-02-23 PROCEDURE — 2500000001 HC RX 250 WO HCPCS SELF ADMINISTERED DRUGS (ALT 637 FOR MEDICARE OP)

## 2024-02-23 PROCEDURE — 1170000001 HC PRIVATE ONCOLOGY ROOM DAILY

## 2024-02-23 PROCEDURE — 99233 SBSQ HOSP IP/OBS HIGH 50: CPT

## 2024-02-23 PROCEDURE — 85025 COMPLETE CBC W/AUTO DIFF WBC: CPT | Performed by: NURSE PRACTITIONER

## 2024-02-23 PROCEDURE — A4217 STERILE WATER/SALINE, 500 ML: HCPCS

## 2024-02-23 PROCEDURE — 80202 ASSAY OF VANCOMYCIN: CPT | Performed by: INTERNAL MEDICINE

## 2024-02-23 PROCEDURE — 85045 AUTOMATED RETICULOCYTE COUNT: CPT | Performed by: NURSE PRACTITIONER

## 2024-02-23 PROCEDURE — 2500000005 HC RX 250 GENERAL PHARMACY W/O HCPCS

## 2024-02-23 PROCEDURE — 2500000004 HC RX 250 GENERAL PHARMACY W/ HCPCS (ALT 636 FOR OP/ED): Performed by: NURSE PRACTITIONER

## 2024-02-23 PROCEDURE — 83615 LACTATE (LD) (LDH) ENZYME: CPT | Performed by: NURSE PRACTITIONER

## 2024-02-23 PROCEDURE — 97161 PT EVAL LOW COMPLEX 20 MIN: CPT | Mod: GP | Performed by: PHYSICAL THERAPIST

## 2024-02-23 PROCEDURE — 2500000001 HC RX 250 WO HCPCS SELF ADMINISTERED DRUGS (ALT 637 FOR MEDICARE OP): Performed by: NURSE PRACTITIONER

## 2024-02-23 PROCEDURE — RXMED WILLOW AMBULATORY MEDICATION CHARGE

## 2024-02-23 PROCEDURE — 80202 ASSAY OF VANCOMYCIN: CPT

## 2024-02-23 RX ORDER — DOXYCYCLINE HYCLATE 100 MG
100 TABLET ORAL EVERY 12 HOURS SCHEDULED
Status: COMPLETED | OUTPATIENT
Start: 2024-02-23 | End: 2024-02-25

## 2024-02-23 RX ORDER — DOXYCYCLINE 100 MG/1
100 CAPSULE ORAL EVERY 12 HOURS SCHEDULED
Qty: 6 CAPSULE | Refills: 0 | Status: SHIPPED | OUTPATIENT
Start: 2024-02-23 | End: 2024-02-25 | Stop reason: HOSPADM

## 2024-02-23 RX ADMIN — POLYETHYLENE GLYCOL 3350 17 G: 17 POWDER, FOR SOLUTION ORAL at 08:17

## 2024-02-23 RX ADMIN — FOLIC ACID 1 MG: 1 TABLET ORAL at 08:17

## 2024-02-23 RX ADMIN — HYDROMORPHONE HYDROCHLORIDE 4 MG: 2 INJECTION, SOLUTION INTRAMUSCULAR; INTRAVENOUS; SUBCUTANEOUS at 18:11

## 2024-02-23 RX ADMIN — GABAPENTIN 300 MG: 300 CAPSULE ORAL at 13:00

## 2024-02-23 RX ADMIN — HYDROMORPHONE HYDROCHLORIDE 4 MG: 2 INJECTION, SOLUTION INTRAMUSCULAR; INTRAVENOUS; SUBCUTANEOUS at 08:17

## 2024-02-23 RX ADMIN — PANTOPRAZOLE SODIUM 40 MG: 40 TABLET, DELAYED RELEASE ORAL at 06:00

## 2024-02-23 RX ADMIN — HYDROMORPHONE HYDROCHLORIDE 4 MG: 2 INJECTION, SOLUTION INTRAMUSCULAR; INTRAVENOUS; SUBCUTANEOUS at 03:39

## 2024-02-23 RX ADMIN — PIPERACILLIN SODIUM AND TAZOBACTAM SODIUM 3.38 G: 3; .375 INJECTION, SOLUTION INTRAVENOUS at 06:52

## 2024-02-23 RX ADMIN — ACYCLOVIR 400 MG: 400 TABLET ORAL at 08:17

## 2024-02-23 RX ADMIN — DOXYCYCLINE HYCLATE 100 MG: 100 TABLET, COATED ORAL at 12:43

## 2024-02-23 RX ADMIN — HYDROMORPHONE HYDROCHLORIDE 4 MG: 2 INJECTION, SOLUTION INTRAMUSCULAR; INTRAVENOUS; SUBCUTANEOUS at 01:09

## 2024-02-23 RX ADMIN — ACYCLOVIR 400 MG: 400 TABLET ORAL at 20:14

## 2024-02-23 RX ADMIN — HYDROMORPHONE HYDROCHLORIDE 4 MG: 2 INJECTION, SOLUTION INTRAMUSCULAR; INTRAVENOUS; SUBCUTANEOUS at 10:45

## 2024-02-23 RX ADMIN — GABAPENTIN 300 MG: 300 CAPSULE ORAL at 06:00

## 2024-02-23 RX ADMIN — ALLOPURINOL 300 MG: 300 TABLET ORAL at 08:17

## 2024-02-23 RX ADMIN — GABAPENTIN 300 MG: 300 CAPSULE ORAL at 22:19

## 2024-02-23 RX ADMIN — HYDROMORPHONE HYDROCHLORIDE 4 MG: 2 INJECTION, SOLUTION INTRAMUSCULAR; INTRAVENOUS; SUBCUTANEOUS at 12:44

## 2024-02-23 RX ADMIN — SENNOSIDES AND DOCUSATE SODIUM 2 TABLET: 8.6; 5 TABLET ORAL at 08:17

## 2024-02-23 RX ADMIN — SENNOSIDES AND DOCUSATE SODIUM 2 TABLET: 8.6; 5 TABLET ORAL at 20:14

## 2024-02-23 RX ADMIN — PIPERACILLIN SODIUM AND TAZOBACTAM SODIUM 3.38 G: 3; .375 INJECTION, SOLUTION INTRAVENOUS at 00:55

## 2024-02-23 RX ADMIN — HYDROMORPHONE HYDROCHLORIDE 4 MG: 2 INJECTION, SOLUTION INTRAMUSCULAR; INTRAVENOUS; SUBCUTANEOUS at 15:23

## 2024-02-23 RX ADMIN — HYDROMORPHONE HYDROCHLORIDE 4 MG: 2 INJECTION, SOLUTION INTRAMUSCULAR; INTRAVENOUS; SUBCUTANEOUS at 20:13

## 2024-02-23 RX ADMIN — LIDOCAINE 1 PATCH: 4 PATCH TOPICAL at 08:17

## 2024-02-23 RX ADMIN — HYDROMORPHONE HYDROCHLORIDE 4 MG: 2 INJECTION, SOLUTION INTRAMUSCULAR; INTRAVENOUS; SUBCUTANEOUS at 05:59

## 2024-02-23 RX ADMIN — DOXYCYCLINE HYCLATE 100 MG: 100 TABLET, COATED ORAL at 20:14

## 2024-02-23 RX ADMIN — ENOXAPARIN SODIUM 40 MG: 100 INJECTION SUBCUTANEOUS at 08:17

## 2024-02-23 RX ADMIN — VANCOMYCIN HYDROCHLORIDE 1500 MG: 5 INJECTION, POWDER, LYOPHILIZED, FOR SOLUTION INTRAVENOUS at 05:17

## 2024-02-23 RX ADMIN — HYDROMORPHONE HYDROCHLORIDE 4 MG: 2 INJECTION, SOLUTION INTRAMUSCULAR; INTRAVENOUS; SUBCUTANEOUS at 22:19

## 2024-02-23 ASSESSMENT — COGNITIVE AND FUNCTIONAL STATUS - GENERAL
CLIMB 3 TO 5 STEPS WITH RAILING: A LITTLE
WALKING IN HOSPITAL ROOM: A LITTLE
CLIMB 3 TO 5 STEPS WITH RAILING: A LITTLE
DRESSING REGULAR LOWER BODY CLOTHING: A LITTLE
STANDING UP FROM CHAIR USING ARMS: A LITTLE
DAILY ACTIVITIY SCORE: 21
MOVING TO AND FROM BED TO CHAIR: A LITTLE
DRESSING REGULAR LOWER BODY CLOTHING: A LITTLE
CLIMB 3 TO 5 STEPS WITH RAILING: A LITTLE
MOVING TO AND FROM BED TO CHAIR: A LITTLE
MOBILITY SCORE: 20
DRESSING REGULAR UPPER BODY CLOTHING: A LITTLE
DAILY ACTIVITIY SCORE: 21
WALKING IN HOSPITAL ROOM: A LITTLE
HELP NEEDED FOR BATHING: A LITTLE
MOBILITY SCORE: 20
STANDING UP FROM CHAIR USING ARMS: A LITTLE
DRESSING REGULAR UPPER BODY CLOTHING: A LITTLE
STANDING UP FROM CHAIR USING ARMS: A LITTLE
WALKING IN HOSPITAL ROOM: A LITTLE
MOVING TO AND FROM BED TO CHAIR: A LITTLE
MOBILITY SCORE: 20
HELP NEEDED FOR BATHING: A LITTLE

## 2024-02-23 ASSESSMENT — PAIN SCALES - GENERAL
PAINLEVEL_OUTOF10: 5 - MODERATE PAIN
PAINLEVEL_OUTOF10: 5 - MODERATE PAIN
PAINLEVEL_OUTOF10: 6
PAINLEVEL_OUTOF10: 5 - MODERATE PAIN
PAINLEVEL_OUTOF10: 8
PAINLEVEL_OUTOF10: 7
PAINLEVEL_OUTOF10: 7
PAINLEVEL_OUTOF10: 5 - MODERATE PAIN
PAINLEVEL_OUTOF10: 8
PAINLEVEL_OUTOF10: 5 - MODERATE PAIN
PAINLEVEL_OUTOF10: 8
PAINLEVEL_OUTOF10: 5 - MODERATE PAIN

## 2024-02-23 ASSESSMENT — PAIN - FUNCTIONAL ASSESSMENT
PAIN_FUNCTIONAL_ASSESSMENT: 0-10

## 2024-02-23 ASSESSMENT — ACTIVITIES OF DAILY LIVING (ADL)
ADLS_ADDRESSED: NO
ADL_ASSISTANCE: INDEPENDENT

## 2024-02-23 NOTE — PROGRESS NOTES
"Joellen Narayan is a 23 y.o. male on day 6 of admission presenting with Sickle-cell disease with pain (CMS/HCC).    Subjective   Joellen is doing fine this morning. He states his pain is improving a little bit today. We discussed keeping his pain regimen the same today with plans of poss DC tomorrow, pt agreed to this. We also discussed transitioning his abx to PO today. Denies any fevers/chills, SOB, or CP, abdominal pain. Last BM 2/23.    Objective     Physical Exam  HENT:      Head: Normocephalic.      Right Ear: External ear normal.      Left Ear: External ear normal.      Nose: Nose normal.   Eyes:      General: Scleral icterus present.      Extraocular Movements: Extraocular movements intact.      Pupils: Pupils are equal, round, and reactive to light.   Cardiovascular:      Rate and Rhythm: Regular rhythm.   Pulmonary:      Comments: Diminished throughout all lung fields  Abdominal:      General: Abdomen is flat. Bowel sounds are normal.      Palpations: Abdomen is soft.   Musculoskeletal:      Cervical back: Normal range of motion and neck supple.      Comments: L calf extremely tender, hot, and very firm throughout  Generalized weakness   Skin:     General: Skin is warm and dry.   Neurological:      General: No focal deficit present.      Mental Status: He is alert and oriented to person, place, and time. Mental status is at baseline.   Psychiatric:         Mood and Affect: Mood normal.         Behavior: Behavior normal.         Thought Content: Thought content normal.         Judgment: Judgment normal.       Last Recorded Vitals  Blood pressure 117/65, pulse 91, temperature 37.2 °C (99 °F), temperature source Temporal, resp. rate 16, height 1.854 m (6' 1\"), weight 72.6 kg (160 lb), SpO2 96 %.  Intake/Output last 3 Shifts:  I/O last 3 completed shifts:  In: 2250 (31 mL/kg) [IV Piggyback:2250]  Out: 3485 (48 mL/kg) [Urine:3485 (1.3 mL/kg/hr)]  Weight: 72.6 kg     Relevant Results        This patient has a " central line   Reason for the central line remaining today?  Blood draws, infusions     .Scheduled medications  acyclovir, 400 mg, oral, q12h REYNALDO  allopurinol, 300 mg, oral, Daily  diclofenac sodium, 4 g, Topical, 4x daily  enoxaparin, 40 mg, subcutaneous, Daily  folic acid, 1 mg, oral, Daily  gabapentin, 300 mg, oral, q8h REYNALDO  lidocaine, 1 patch, transdermal, Daily  pantoprazole, 40 mg, oral, Daily before breakfast  piperacillin-tazobactam, 3.375 g, intravenous, q6h  polyethylene glycol, 17 g, oral, Daily  sennosides-docusate sodium, 2 tablet, oral, BID  vancomycin, 1,500 mg, intravenous, q8h      Continuous medications       PRN medications  PRN medications: acetaminophen, albuterol, diphenhydrAMINE, HYDROmorphone, lactulose, naloxone, ondansetron    .  Results for orders placed or performed during the hospital encounter of 02/16/24 (from the past 24 hour(s))   Type And Screen   Result Value Ref Range    ABO TYPE B     Rh TYPE POS     ANTIBODY SCREEN NEG    CBC and Auto Differential   Result Value Ref Range    WBC 11.3 4.4 - 11.3 x10*3/uL    nRBC 0.3 (H) 0.0 - 0.0 /100 WBCs    RBC 3.17 (L) 4.50 - 5.90 x10*6/uL    Hemoglobin 8.9 (L) 13.5 - 17.5 g/dL    Hematocrit 28.6 (L) 41.0 - 52.0 %    MCV 90 80 - 100 fL    MCH 28.1 26.0 - 34.0 pg    MCHC 31.1 (L) 32.0 - 36.0 g/dL    RDW 18.8 (H) 11.5 - 14.5 %    Platelets 295 150 - 450 x10*3/uL    Neutrophils % 65.1 40.0 - 80.0 %    Immature Granulocytes %, Automated 0.5 0.0 - 0.9 %    Lymphocytes % 17.7 13.0 - 44.0 %    Monocytes % 12.0 2.0 - 10.0 %    Eosinophils % 4.3 0.0 - 6.0 %    Basophils % 0.4 0.0 - 2.0 %    Neutrophils Absolute 7.34 1.20 - 7.70 x10*3/uL    Immature Granulocytes Absolute, Automated 0.06 0.00 - 0.70 x10*3/uL    Lymphocytes Absolute 2.00 1.20 - 4.80 x10*3/uL    Monocytes Absolute 1.35 (H) 0.10 - 1.00 x10*3/uL    Eosinophils Absolute 0.48 0.00 - 0.70 x10*3/uL    Basophils Absolute 0.04 0.00 - 0.10 x10*3/uL   Comprehensive Metabolic Panel   Result Value Ref  Range    Glucose 118 (H) 74 - 99 mg/dL    Sodium 134 (L) 136 - 145 mmol/L    Potassium 3.8 3.5 - 5.3 mmol/L    Chloride 95 (L) 98 - 107 mmol/L    Bicarbonate 25 21 - 32 mmol/L    Anion Gap 18 10 - 20 mmol/L    Urea Nitrogen 5 (L) 6 - 23 mg/dL    Creatinine 0.61 0.50 - 1.30 mg/dL    eGFR >90 >60 mL/min/1.73m*2    Calcium 8.9 8.6 - 10.6 mg/dL    Albumin 3.5 3.4 - 5.0 g/dL    Alkaline Phosphatase 303 (H) 33 - 120 U/L    Total Protein 5.8 (L) 6.4 - 8.2 g/dL     (H) 9 - 39 U/L    Bilirubin, Total 6.1 (H) 0.0 - 1.2 mg/dL    ALT 81 (H) 10 - 52 U/L   Lactate Dehydrogenase   Result Value Ref Range     (H) 84 - 246 U/L   Reticulocytes   Result Value Ref Range    Retic % 5.4 (H) 0.5 - 2.0 %    Retic Absolute 0.172 (H) 0.022 - 0.118 x10*6/uL    Reticulocyte Hemoglobin 26 (L) 28 - 38 pg    Immature Retic fraction 35.1 (H) <=16.0 %   Vancomycin   Result Value Ref Range    Vancomycin 32.8 (H) 5.0 - 20.0 ug/mL       Assessment/Plan   Principal Problem:    Sickle-cell disease with pain (CMS/HCC)  Active Problems:    Sickle cell crisis (CMS/HCC)    Joellen Narayan is a 23 y.o.Male PMH of newly diagnosed nodular lymphocyte predominant Hodgkins lymphoma (NLPHL) (on rituxan/prednisone, most recently received C2 2/8/24), 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 Of note, patient was just admitted for sickle cell pain management w/o complications (2/12-2/14). In ED, pt found to have mildly elevated temp 37.5 and leukocytosis (23.3k) higher than his baseline and atypical of his former sickle cell pain presentations. Of note, he was previously on prednisone for his lymphoma treatment (days 1-5) but last received on 2/13 and previous labs did not  indicate such pronounced leukocytosis. CXR (2/16) w/o evidence of consolidation. COVID, Flu A/B (2/16) negative. Resp viral panel pending. Blood cultures x2 (2/16-NGTD). Started on Cefepime (2/16-2/19) and Vanc (2/16-2/19), s/p Flagly once in ED. Endorsed some RLQ abdominal pain in ED, and elevated D-dimer 6,363. CT angio chest/abd/pelvis neg for PE but showed L > R GGOs c/f infectious process vs acute chest syndrome. No acute abd pathology, substantial improvement in lymphadenopathy. 2/17 Pt developed new O2 requirement to 3L, became febrile 38, had significant chest pain, and toxic ill appearance meeting criteria for ACS. Transfusion Med consulted for urgent RBC exchange and IR consulted for apheresis line placement, s/p line placement/ RBC exchange 2/17 evening. 2/19 stopped IV atbs d/t hallucinations with cefepime (allergy to ceftriaxone and cross-sensitivity), started Levaquin (2/19-2/21). s/p dPCA (2/17-2/19), transitioned to IVP dilaudid 2/19. Had a slight fever over night 2/19, repeat Bcx x2 NGTD, UA neg, continued Levaquin. Duplex LLE performed 2/20 d/t severe LLE pain which was negative for DVT. On exam 2/21, L calf extremely tender, hot, and very firm throughout. CT of the L knee/tibia and fibula ordered/pending to r/o any other process. Pt also had fever of 38.8 (2/21), Levaquin stopped and s/p Zosyn (2/21-2/23). Repeat Bcx x2, UA, and CXR ordered. CT L knee/tibia showed poss cellulitis, (2/21-2/23) s/p IV vancomycin. (2/23-current) start doxycycline BID for cellulitis tx. DC home pending improvement in pain and infectious process.     # Hgb SS with severe pain  # Acute Chest Syndrome   - Follows in  sickle cell clinic, last seen on 1/23/24  - OARRS reviewed, no aberrant behavior noted   - Not on any disease modifying medications (per outpatient note 1/23/24, was sent oxybryta rx for DMT but denied by insurance, must trial endari first, sent rx for endari but patient has yet to start it)  - Hgb  baseline ~8.5, Hgb 8.5 (2/16) --> post RBC exchange Hgb 10 (2/18)--> Hgb 8.9 (2/23)  - Tbili baseline fluctuates ~5-13, Tbili 10.6 (2/18)--> 6.1 (2/23)   - LDH baseline fluctuates but ~500,  (2/18)--> 636 (2/20)--> 484 (2/23)  - No current care path  - Mildly elevated temp in ED of 37.5 and leukocytosis of 23.3k with left shift (2/16), baseline ~13 with previous sickle cell pain admissions --> WBC (2/18) 11--> WBC (2/23) 11.3  - s/p prednisone as part of chemo regimen on days 1-5 but last received 2/13 and labs did not indicate such pronounced leukocytosis at that time   - 2V CXR (2/16) w/o evidence of consolidation  - COVID, Flu A/B (2/16): negative  - Resp viral panel (2/16): negative  - s/p on admit started IV dilaudid 4mg q2hrs PRN severe pain (2/16-2/17); was writhing in pain and appeared in significant distress  - (2/17-2/19) s/p dPCA  - 2/19 s/p IV dilaudid 4mg q2hrs PRN severe pain, s/p increased 2/20 to 4.5mg  - s/p increased IV dilaudid to 6mg q2hrs PRN severe pain (2/21) d/t severe uncontrolled pain  - (2/22-current) continue IV dilaudid 4mg q2h PRN for pain  - s/p IVP Toradol 30mg q6hrs x3 days (2/16-2/19) with Protonix for PPI prophy  - Continue home folic acid 1mg daily  - Hgb S (2/16) 78.3%, 2/14 Hg S 80.3%, post-exchange Hgb S (2/19) 25.2%  - Utox (2/16) +MJ  - PO Zofran PRN for opioid-induced nausea, PO Benadryl PRN for opioid-induced pruritus  - Bowel regimen for opioid-induced constipation with DocuSenna 2tabs BID, Miralax daily, Lactulose BID PRN; LBM 2/22  - 2/16 CT PE showed negative PE but L > R basilar GGOs w/ more consolidative opacity within LLL c/f c/f infectious process vs acute chest syndrome  - Noted some RLQ abdominal pain in ED, and elevated D-dimer 6,363  - 2/16 CT A/P w/ contrast ordered to r/o PE and intra-abdominal pathology, showed no acute abdominopelvic abnormality, cholelithiasis, extensive adenopathy improved from prior PET 12/2023   - Amylase 15, lipase 4  - 2/17 AM  Pt became febrile 38, developed new O2 requirement to 3L NC, reports chest pain, and has toxic ill appearance, pt briefly tachypneic 60s prior to PCA being started now improved, reports feel worse than his ACS episode from last Feb 2023   - ACS is defined as radiographic evidence of consolidation plus fever > 38.5, New oxygen req, severe chest pain, Respiratory distress or new wheezing  - Both IR on-call attending and MICU consulted for urgent apheresis line placement, s/p apheresis line placement by IR in evening 2/17  - Transfusion Med consulted for urgent RBC exchange, s/p RBC exchange 2/17  - s/p Flagly once in ED, s/p Cefepime (2/16-2/19) and Vanc (2/16-2/19)   - 2/19 stopped IV atbs d/t hallucinations with cefepime (allergy to ceftriaxone and cross-sensitivity), started Levaquin (2/19-2/21)  - Blood cultures x2 (2/16 and 2/19 NGTD)  - Lactate level 1.7, Procal 0.82 (2/19)  - Duplex LLE (2/20) d/t severe LLE pain which was negative for DVT  - On exam 2/21, L calf extremely tender, hot, and very firm throughout. CT of the L knee/tibia and fibula ordered/pending to r/o any other process  - 2/21 fever of 38.8, Levaquin stopped and s/p Zosyn (2/21-2/23). Repeat Bcx x2, UA, and CXR ordered  - CXR (2/21) neg for acute process, UA (2/21) neg  - Blood cultures x2 (2/21) NGTD  - CT L knee/tibia (2/21) showed great saphenous thrombophlebitis vs cellulitis  - Encourage leg elevation and compression stocking as tolerated  - (2/21-2/23) s/p IV vancomycin RPH to dose   - (2/23- plan stop 2/25) start doxycycline 100mg BID for 5 days total of abx tx for cellulitis per attending    # Nodular lymphocyte predominant Hodgkins lymphoma (NLPHL)   - Primary Oncologist: Dr. Stoll-- emailed/updated on admit 2/16  - Enlarged lymph nodes noted 4/1/22  - RUQ US (11/14/22) with mildly enlarged LNs in the region of the kavin hepatis  - MRI liver (11/16/22) showed re-demonstration of bulky retroperitoneal lymphadenopathy and kavin hepatic  lymphadenopathy    - (11/18/22) lymph node biopsy showed atypical lymphoid infiltrate. Reviewed by Hemepath board, insufficient for lymphoma diagnosis  - PET/CT (12/6/22) showing retroperitoneal lymphadenopathy  - Followed up with Dr. Stoll (12/16/22) with plan for surg/onc consult for large tissue bx but patient missed apt and was lost to follow up  - Requested new apt with Dr. Ronnie Marte 6/19/23, patient was not seen and lost to follow up  - (11/28/23) CT A/P showed increased size of retroperitoneal lymph nodes reflecting extramedullary hematopoiesis I/s/o sickle cell vs lymphoma  - Paraaortic LN bx via IR (11/30/23) consistent with NLPHL. Flow: no clonal B cell or T cell population identified, lymphocyte 95%, CD3+CD4+ 68%, CD3+CD8+ 7%, CD19+ 24%  - Elevated LDH/bili partially from sickle cell disease   - Chemotherapy (R-CHOP) was discussed with primary oncologist Dr. Stoll, and decision was made to simplify his chemotherapy to Rituxan and prednisone q3 weeks mainly due to frequent sickle cell crisis  - Current chemo regimen: rituxan and prednisone q3 weeks (C1 1/18/24, C2 2/8/24, C3 due 2/29/24)  - 2/16 started Acyclovir 400mg BID prophy per Dr. Stoll     # Hx of nocturnal oxygen dependence and hypoxia on room air  - Historically improves with oxygen supplementation  - No O2 needs on admission, SpO2 was stable  - Has not had home oxygen for 2-3 years   - Pt did not qualify for home O2 per walking pulse ox performed during previous hospital admission on 2/14/24  - 2/17 Developed new 3L O2 requirement, on RA as of 2/20 with stable SpO2  - s/p continuous pulse ox without acute events  - Has pulm apt on 2/21/24 --> requested reschedule     DVT prophy: Lovenox subcutaneous, SCDs, encourage ambulation     DISPO:  - Full Code  -  DC home pending improvement in pain and infectious process  - FUV Gastro NPV and Pulm NPV requested reschedule, Infusion for C3 ritux on 2/29. Sickle Cell FUV (Renee Barrera) on 3/20      I spent >60 minutes in the professional and overall care of this patient.     Assessment and plan discussed with attending physician Dr. Alex Couch PA-C

## 2024-02-23 NOTE — PROGRESS NOTES
Spiritual Care Visit    Clinical Encounter Type  Visited With: Patient  Routine Visit: Follow-up  Continue Visiting: Yes     followed up with patient Joellen Narayan. Patient shared he was able to work with PT today, and is feeling somewhat better. He expressed appreciation for 's time and for follow up, and did not have any needs at this time.  will provide ongoing support and Spiritual Care remains available as needed/requested.    Rev. Diana Álvarez MDiv, BCC
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
## 2024-02-24 ENCOUNTER — APPOINTMENT (OUTPATIENT)
Dept: CARDIOLOGY | Facility: HOSPITAL | Age: 24
End: 2024-02-24
Payer: COMMERCIAL

## 2024-02-24 ENCOUNTER — PHARMACY VISIT (OUTPATIENT)
Dept: PHARMACY | Facility: CLINIC | Age: 24
End: 2024-02-24
Payer: MEDICARE

## 2024-02-24 PROBLEM — D57.00 SICKLE-CELL DISEASE WITH PAIN (MULTI): Status: RESOLVED | Noted: 2024-02-16 | Resolved: 2024-02-24

## 2024-02-24 PROBLEM — D57.00 SICKLE CELL CRISIS (MULTI): Status: RESOLVED | Noted: 2023-12-25 | Resolved: 2024-02-24

## 2024-02-24 LAB
ALBUMIN SERPL BCP-MCNC: 3.6 G/DL (ref 3.4–5)
ALP SERPL-CCNC: 312 U/L (ref 33–120)
ALT SERPL W P-5'-P-CCNC: 100 U/L (ref 10–52)
ANION GAP SERPL CALC-SCNC: 12 MMOL/L (ref 10–20)
AST SERPL W P-5'-P-CCNC: 107 U/L (ref 9–39)
BASOPHILS # BLD AUTO: 0.05 X10*3/UL (ref 0–0.1)
BASOPHILS NFR BLD AUTO: 0.5 %
BILIRUB DIRECT SERPL-MCNC: 2.3 MG/DL (ref 0–0.3)
BILIRUB SERPL-MCNC: 5.6 MG/DL (ref 0–1.2)
BUN SERPL-MCNC: 5 MG/DL (ref 6–23)
CALCIUM SERPL-MCNC: 9.4 MG/DL (ref 8.6–10.6)
CHLORIDE SERPL-SCNC: 100 MMOL/L (ref 98–107)
CO2 SERPL-SCNC: 30 MMOL/L (ref 21–32)
CREAT SERPL-MCNC: 0.51 MG/DL (ref 0.5–1.3)
EGFRCR SERPLBLD CKD-EPI 2021: >90 ML/MIN/1.73M*2
EOSINOPHIL # BLD AUTO: 0.39 X10*3/UL (ref 0–0.7)
EOSINOPHIL NFR BLD AUTO: 3.8 %
ERYTHROCYTE [DISTWIDTH] IN BLOOD BY AUTOMATED COUNT: 18.6 % (ref 11.5–14.5)
GLUCOSE SERPL-MCNC: 98 MG/DL (ref 74–99)
HCT VFR BLD AUTO: 26.6 % (ref 41–52)
HGB BLD-MCNC: 8.7 G/DL (ref 13.5–17.5)
HGB RETIC QN: 29 PG (ref 28–38)
IMM GRANULOCYTES # BLD AUTO: 0.04 X10*3/UL (ref 0–0.7)
IMM GRANULOCYTES NFR BLD AUTO: 0.4 % (ref 0–0.9)
IMMATURE RETIC FRACTION: 39.3 %
LDH SERPL L TO P-CCNC: 431 U/L (ref 84–246)
LYMPHOCYTES # BLD AUTO: 2.43 X10*3/UL (ref 1.2–4.8)
LYMPHOCYTES NFR BLD AUTO: 24 %
MCH RBC QN AUTO: 28.4 PG (ref 26–34)
MCHC RBC AUTO-ENTMCNC: 32.7 G/DL (ref 32–36)
MCV RBC AUTO: 87 FL (ref 80–100)
MONOCYTES # BLD AUTO: 1.12 X10*3/UL (ref 0.1–1)
MONOCYTES NFR BLD AUTO: 11 %
NEUTROPHILS # BLD AUTO: 6.11 X10*3/UL (ref 1.2–7.7)
NEUTROPHILS NFR BLD AUTO: 60.3 %
NRBC BLD-RTO: 0.4 /100 WBCS (ref 0–0)
PLATELET # BLD AUTO: 333 X10*3/UL (ref 150–450)
POTASSIUM SERPL-SCNC: 3.7 MMOL/L (ref 3.5–5.3)
PROT SERPL-MCNC: 6.5 G/DL (ref 6.4–8.2)
RBC # BLD AUTO: 3.06 X10*6/UL (ref 4.5–5.9)
RETICS #: 0.21 X10*6/UL (ref 0.02–0.12)
RETICS/RBC NFR AUTO: 6.8 % (ref 0.5–2)
SODIUM SERPL-SCNC: 138 MMOL/L (ref 136–145)
WBC # BLD AUTO: 10.1 X10*3/UL (ref 4.4–11.3)

## 2024-02-24 PROCEDURE — 2500000001 HC RX 250 WO HCPCS SELF ADMINISTERED DRUGS (ALT 637 FOR MEDICARE OP)

## 2024-02-24 PROCEDURE — 85045 AUTOMATED RETICULOCYTE COUNT: CPT | Performed by: NURSE PRACTITIONER

## 2024-02-24 PROCEDURE — 1170000001 HC PRIVATE ONCOLOGY ROOM DAILY

## 2024-02-24 PROCEDURE — 93005 ELECTROCARDIOGRAM TRACING: CPT

## 2024-02-24 PROCEDURE — 93010 ELECTROCARDIOGRAM REPORT: CPT | Performed by: INTERNAL MEDICINE

## 2024-02-24 PROCEDURE — 2500000004 HC RX 250 GENERAL PHARMACY W/ HCPCS (ALT 636 FOR OP/ED): Performed by: NURSE PRACTITIONER

## 2024-02-24 PROCEDURE — 82248 BILIRUBIN DIRECT: CPT | Performed by: NURSE PRACTITIONER

## 2024-02-24 PROCEDURE — 2500000001 HC RX 250 WO HCPCS SELF ADMINISTERED DRUGS (ALT 637 FOR MEDICARE OP): Performed by: NURSE PRACTITIONER

## 2024-02-24 PROCEDURE — 99233 SBSQ HOSP IP/OBS HIGH 50: CPT | Performed by: NURSE PRACTITIONER

## 2024-02-24 PROCEDURE — 2500000004 HC RX 250 GENERAL PHARMACY W/ HCPCS (ALT 636 FOR OP/ED)

## 2024-02-24 PROCEDURE — 2500000002 HC RX 250 W HCPCS SELF ADMINISTERED DRUGS (ALT 637 FOR MEDICARE OP, ALT 636 FOR OP/ED): Performed by: NURSE PRACTITIONER

## 2024-02-24 PROCEDURE — 80053 COMPREHEN METABOLIC PANEL: CPT | Performed by: NURSE PRACTITIONER

## 2024-02-24 PROCEDURE — 2500000005 HC RX 250 GENERAL PHARMACY W/O HCPCS

## 2024-02-24 PROCEDURE — 83615 LACTATE (LD) (LDH) ENZYME: CPT | Performed by: NURSE PRACTITIONER

## 2024-02-24 PROCEDURE — 85025 COMPLETE CBC W/AUTO DIFF WBC: CPT | Performed by: NURSE PRACTITIONER

## 2024-02-24 RX ORDER — OXYCODONE HYDROCHLORIDE 5 MG/1
15 TABLET ORAL EVERY 4 HOURS PRN
Status: DISCONTINUED | OUTPATIENT
Start: 2024-02-24 | End: 2024-02-25

## 2024-02-24 RX ADMIN — HYDROMORPHONE HYDROCHLORIDE 4 MG: 2 INJECTION, SOLUTION INTRAMUSCULAR; INTRAVENOUS; SUBCUTANEOUS at 04:25

## 2024-02-24 RX ADMIN — ENOXAPARIN SODIUM 40 MG: 100 INJECTION SUBCUTANEOUS at 08:30

## 2024-02-24 RX ADMIN — HYDROMORPHONE HYDROCHLORIDE 4 MG: 2 INJECTION, SOLUTION INTRAMUSCULAR; INTRAVENOUS; SUBCUTANEOUS at 00:25

## 2024-02-24 RX ADMIN — OXYCODONE HYDROCHLORIDE 15 MG: 5 TABLET ORAL at 19:29

## 2024-02-24 RX ADMIN — HYDROMORPHONE HYDROCHLORIDE 4 MG: 2 INJECTION, SOLUTION INTRAMUSCULAR; INTRAVENOUS; SUBCUTANEOUS at 02:25

## 2024-02-24 RX ADMIN — ALLOPURINOL 300 MG: 300 TABLET ORAL at 08:30

## 2024-02-24 RX ADMIN — SENNOSIDES AND DOCUSATE SODIUM 2 TABLET: 8.6; 5 TABLET ORAL at 08:30

## 2024-02-24 RX ADMIN — HYDROMORPHONE HYDROCHLORIDE 4 MG: 2 INJECTION, SOLUTION INTRAMUSCULAR; INTRAVENOUS; SUBCUTANEOUS at 08:34

## 2024-02-24 RX ADMIN — POLYETHYLENE GLYCOL 3350 17 G: 17 POWDER, FOR SOLUTION ORAL at 08:30

## 2024-02-24 RX ADMIN — ACYCLOVIR 400 MG: 400 TABLET ORAL at 21:01

## 2024-02-24 RX ADMIN — HYDROMORPHONE HYDROCHLORIDE 4 MG: 2 INJECTION, SOLUTION INTRAMUSCULAR; INTRAVENOUS; SUBCUTANEOUS at 21:38

## 2024-02-24 RX ADMIN — GABAPENTIN 300 MG: 300 CAPSULE ORAL at 06:35

## 2024-02-24 RX ADMIN — GABAPENTIN 300 MG: 300 CAPSULE ORAL at 13:09

## 2024-02-24 RX ADMIN — GABAPENTIN 300 MG: 300 CAPSULE ORAL at 21:01

## 2024-02-24 RX ADMIN — HYDROMORPHONE HYDROCHLORIDE 4 MG: 2 INJECTION, SOLUTION INTRAMUSCULAR; INTRAVENOUS; SUBCUTANEOUS at 13:10

## 2024-02-24 RX ADMIN — DOXYCYCLINE HYCLATE 100 MG: 100 TABLET, COATED ORAL at 08:30

## 2024-02-24 RX ADMIN — FOLIC ACID 1 MG: 1 TABLET ORAL at 08:30

## 2024-02-24 RX ADMIN — LIDOCAINE 1 PATCH: 4 PATCH TOPICAL at 08:30

## 2024-02-24 RX ADMIN — OXYCODONE HYDROCHLORIDE 15 MG: 5 TABLET ORAL at 10:48

## 2024-02-24 RX ADMIN — ACYCLOVIR 400 MG: 400 TABLET ORAL at 08:30

## 2024-02-24 RX ADMIN — DOXYCYCLINE HYCLATE 100 MG: 100 TABLET, COATED ORAL at 21:01

## 2024-02-24 RX ADMIN — PANTOPRAZOLE SODIUM 40 MG: 40 TABLET, DELAYED RELEASE ORAL at 06:35

## 2024-02-24 RX ADMIN — HYDROMORPHONE HYDROCHLORIDE 4 MG: 2 INJECTION, SOLUTION INTRAMUSCULAR; INTRAVENOUS; SUBCUTANEOUS at 06:35

## 2024-02-24 ASSESSMENT — PAIN SCALES - GENERAL
PAINLEVEL_OUTOF10: 8
PAINLEVEL_OUTOF10: 8
PAINLEVEL_OUTOF10: 5 - MODERATE PAIN
PAINLEVEL_OUTOF10: 7
PAINLEVEL_OUTOF10: 7
PAINLEVEL_OUTOF10: 5 - MODERATE PAIN
PAINLEVEL_OUTOF10: 5 - MODERATE PAIN
PAINLEVEL_OUTOF10: 8
PAINLEVEL_OUTOF10: 5 - MODERATE PAIN
PAINLEVEL_OUTOF10: 8
PAINLEVEL_OUTOF10: 5 - MODERATE PAIN
PAINLEVEL_OUTOF10: 7
PAINLEVEL_OUTOF10: 5 - MODERATE PAIN
PAINLEVEL_OUTOF10: 7
PAINLEVEL_OUTOF10: 7

## 2024-02-24 ASSESSMENT — PAIN - FUNCTIONAL ASSESSMENT
PAIN_FUNCTIONAL_ASSESSMENT: 0-10

## 2024-02-24 ASSESSMENT — COGNITIVE AND FUNCTIONAL STATUS - GENERAL
DAILY ACTIVITIY SCORE: 24
MOBILITY SCORE: 23
CLIMB 3 TO 5 STEPS WITH RAILING: A LITTLE

## 2024-02-24 NOTE — PROGRESS NOTES
"Joellen Narayan is a 23 y.o. male on day 7 of admission presenting with Sickle-cell disease with pain (CMS/HCC).    Subjective   Seen this AM at the bedside. Pt still reporting pain in his LLE but overall improving. Willing to start rotating pain regimen today with plan for potential dc tomorrow. Denies any fevers/chills, SOB, or CP.    Objective     Physical Exam  HENT:      Head: Normocephalic.      Right Ear: External ear normal.      Left Ear: External ear normal.      Nose: Nose normal.   Eyes:      General: Scleral icterus present.      Extraocular Movements: Extraocular movements intact.      Pupils: Pupils are equal, round, and reactive to light.   Cardiovascular:      Rate and Rhythm: Regular rhythm.   Pulmonary:      Comments: Diminished throughout all lung fields  Abdominal:      General: Abdomen is flat. Bowel sounds are normal.      Palpations: Abdomen is soft.   Musculoskeletal:      Cervical back: Normal range of motion and neck supple.   Skin:     General: Skin is warm and dry.   Neurological:      General: No focal deficit present.      Mental Status: He is alert and oriented to person, place, and time. Mental status is at baseline.   Psychiatric:         Mood and Affect: Mood normal.         Behavior: Behavior normal.         Thought Content: Thought content normal.         Judgment: Judgment normal.       Last Recorded Vitals  Blood pressure 118/71, pulse 92, temperature 36 °C (96.8 °F), resp. rate 16, height 1.854 m (6' 1\"), weight 72.6 kg (160 lb), SpO2 95 %.  Intake/Output last 3 Shifts:  I/O last 3 completed shifts:  In: 1750 (24.1 mL/kg) [P.O.:200; IV Piggyback:1550]  Out: 3645 (50.2 mL/kg) [Urine:3645 (1.4 mL/kg/hr)]  Weight: 72.6 kg     Relevant Results        This patient has a central line   Reason for the central line remaining today?  Blood draws, infusions     .Scheduled medications  acyclovir, 400 mg, oral, q12h REYNALDO  allopurinol, 300 mg, oral, Daily  diclofenac sodium, 4 g, Topical, " 4x daily  doxycylcine, 100 mg, oral, q12h REYNALDO  enoxaparin, 40 mg, subcutaneous, Daily  folic acid, 1 mg, oral, Daily  gabapentin, 300 mg, oral, q8h REYNALDO  lidocaine, 1 patch, transdermal, Daily  pantoprazole, 40 mg, oral, Daily before breakfast  polyethylene glycol, 17 g, oral, Daily  sennosides-docusate sodium, 2 tablet, oral, BID      Continuous medications       PRN medications  PRN medications: acetaminophen, albuterol, diphenhydrAMINE, HYDROmorphone, lactulose, naloxone, ondansetron, oxyCODONE    .  Results for orders placed or performed during the hospital encounter of 02/16/24 (from the past 24 hour(s))   CBC and Auto Differential   Result Value Ref Range    WBC 10.1 4.4 - 11.3 x10*3/uL    nRBC 0.4 (H) 0.0 - 0.0 /100 WBCs    RBC 3.06 (L) 4.50 - 5.90 x10*6/uL    Hemoglobin 8.7 (L) 13.5 - 17.5 g/dL    Hematocrit 26.6 (L) 41.0 - 52.0 %    MCV 87 80 - 100 fL    MCH 28.4 26.0 - 34.0 pg    MCHC 32.7 32.0 - 36.0 g/dL    RDW 18.6 (H) 11.5 - 14.5 %    Platelets 333 150 - 450 x10*3/uL    Neutrophils % 60.3 40.0 - 80.0 %    Immature Granulocytes %, Automated 0.4 0.0 - 0.9 %    Lymphocytes % 24.0 13.0 - 44.0 %    Monocytes % 11.0 2.0 - 10.0 %    Eosinophils % 3.8 0.0 - 6.0 %    Basophils % 0.5 0.0 - 2.0 %    Neutrophils Absolute 6.11 1.20 - 7.70 x10*3/uL    Immature Granulocytes Absolute, Automated 0.04 0.00 - 0.70 x10*3/uL    Lymphocytes Absolute 2.43 1.20 - 4.80 x10*3/uL    Monocytes Absolute 1.12 (H) 0.10 - 1.00 x10*3/uL    Eosinophils Absolute 0.39 0.00 - 0.70 x10*3/uL    Basophils Absolute 0.05 0.00 - 0.10 x10*3/uL   Comprehensive Metabolic Panel   Result Value Ref Range    Glucose 98 74 - 99 mg/dL    Sodium 138 136 - 145 mmol/L    Potassium 3.7 3.5 - 5.3 mmol/L    Chloride 100 98 - 107 mmol/L    Bicarbonate 30 21 - 32 mmol/L    Anion Gap 12 10 - 20 mmol/L    Urea Nitrogen 5 (L) 6 - 23 mg/dL    Creatinine 0.51 0.50 - 1.30 mg/dL    eGFR >90 >60 mL/min/1.73m*2    Calcium 9.4 8.6 - 10.6 mg/dL    Albumin 3.6 3.4 - 5.0  g/dL    Alkaline Phosphatase 312 (H) 33 - 120 U/L    Total Protein 6.5 6.4 - 8.2 g/dL     (H) 9 - 39 U/L    Bilirubin, Total 5.6 (H) 0.0 - 1.2 mg/dL     (H) 10 - 52 U/L   Lactate Dehydrogenase   Result Value Ref Range     (H) 84 - 246 U/L   Reticulocytes   Result Value Ref Range    Retic % 6.8 (H) 0.5 - 2.0 %    Retic Absolute 0.209 (H) 0.022 - 0.118 x10*6/uL    Reticulocyte Hemoglobin 29 28 - 38 pg    Immature Retic fraction 39.3 (H) <=16.0 %   Bilirubin, Direct   Result Value Ref Range    Bilirubin, Direct 2.3 (H) 0.0 - 0.3 mg/dL       Assessment/Plan   Principal Problem:    Sickle-cell disease with pain (CMS/HCC)  Active Problems:    Sickle cell crisis (CMS/HCC)    Joellen Narayan is a 23 y.o.Male PMH of newly diagnosed nodular lymphocyte predominant Hodgkins lymphoma (NLPHL) (on rituxan/prednisone, most recently received C2 2/8/24), 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 Of note, patient was just admitted for sickle cell pain management w/o complications (2/12-2/14). In ED, pt found to have mildly elevated temp 37.5 and leukocytosis (23.3k) higher than his baseline and atypical of his former sickle cell pain presentations. Of note, he was previously on prednisone for his lymphoma treatment (days 1-5) but last received on 2/13 and previous labs did not indicate such pronounced leukocytosis. CXR (2/16) w/o evidence of consolidation. COVID, Flu A/B (2/16) negative. Resp viral panel pending. Blood cultures x2 (2/16-NGTD). Started on Cefepime (2/16-2/19) and Vanc (2/16-2/19), s/p Flagly once in ED. Endorsed some RLQ abdominal pain in ED, and elevated D-dimer 6,363. CT angio chest/abd/pelvis neg for PE but showed L > R  GGOs c/f infectious process vs acute chest syndrome. No acute abd pathology, substantial improvement in lymphadenopathy. 2/17 Pt developed new O2 requirement to 3L, became febrile 38, had significant chest pain, and toxic ill appearance meeting criteria for ACS. Transfusion Med consulted for urgent RBC exchange and IR consulted for apheresis line placement, s/p line placement/ RBC exchange 2/17 evening. 2/19 stopped IV atbs d/t hallucinations with cefepime (allergy to ceftriaxone and cross-sensitivity), started Levaquin (2/19-2/21). s/p dPCA (2/17-2/19), transitioned to IVP dilaudid 2/19. Had a slight fever over night 2/19, repeat Bcx x2 NGTD, UA neg, continued Levaquin. Duplex LLE performed 2/20 d/t severe LLE pain which was negative for DVT. On exam 2/21, L calf extremely tender, hot, and very firm throughout. CT of the L knee/tibia and fibula ordered/pending to r/o any other process. Pt also had fever of 38.8 (2/21), Levaquin stopped and started Zosyn (2/21-2/24). Repeat Bcx x2, UA, and CXR ordered. CT L knee/tibia showed poss cellulitis, (2/21-2/24) started IV vancomycin. Transitioned to doxycycline (2/23-planned stop 2/25). Started rotating pain regimen with oxycodone and IV dilaudid 2/24. DC home pending improvement in pain, likely tomorrow 2/25.     # Hgb SS with severe pain  # Acute Chest Syndrome   - Follows in  sickle cell clinic, last seen on 1/23/24  - [place] reviewed, no aberrant behavior noted   - Not on any disease modifying medications (per outpatient note 1/23/24, was sent oxybryta rx for DMT but denied by insurance, must trial endari first, sent rx for endari but patient has yet to start it)  - Hgb baseline ~8.5, Hgb 8.5 (2/16) --> post RBC exchange Hgb 10 (2/18)--> Hgb 8.7 (2/24)  - Tbili baseline fluctuates ~5-13, Tbili 10.6 (2/18)--> 5.6 (2/24)  - LDH baseline fluctuates but ~500,  (2/18)--> 636 (2/20)--> 791 (2/21)--> 431 (2/24)  - No current care path  - Mildly elevated temp in ED of  37.5 and leukocytosis of 23.3k with left shift (2/16), baseline ~13 with previous sickle cell pain admissions --> WBC (2/18) 11--> (2/21) 14.3  - s/p prednisone as part of chemo regimen on days 1-5 but last received 2/13 and labs did not indicate such pronounced leukocytosis at that time   - 2V CXR (2/16) w/o evidence of consolidation  - COVID, Flu A/B (2/16): negative  - Resp viral panel (2/16): negative  - s/p on admit started IV dilaudid 4mg q2hrs PRN severe pain (2/16-2/17); was writhing in pain and appeared in significant distress  - (2/17-2/19) s/p dPCA  - 2/19 started IV dilaudid 4mg q2hrs PRN severe pain, increased 2/20 to 4.5mg  - s/p increased IV dilaudid to 6mg q2hrs PRN severe pain (2/21) d/t severe uncontrolled pain  - s/p IV dilaudid 4mg q2h PRN for pain (2/22-2/24)  - 2/24 started oxycodone 15mg q4hr PRN severe pain and IV dilaudid 4mg q4hrs PRN severe pain to rotate for q2hr pain meds if needed  - s/p IVP Toradol 30mg q6hrs x3 days (2/16-2/19) with Protonix for PPI prophy  - Continue home folic acid 1mg daily  - Hgb S (2/16) 78.3%, 2/14 Hg S 80.3%, post-exchange Hgb S (2/19) 25.2%  - Utox (2/16) +MJ  - PO Zofran PRN for opioid-induced nausea, PO Benadryl PRN for opioid-induced pruritus  - Bowel regimen for opioid-induced constipation with DocuSenna 2tabs BID, Miralax daily, Lactulose BID PRN; LBM 2/21  - 2/16 CT PE showed negative PE but L > R basilar GGOs w/ more consolidative opacity within LLL c/f c/f infectious process vs acute chest syndrome  - Noted some RLQ abdominal pain in ED, and elevated D-dimer 6,363  - 2/16 CT A/P w/ contrast ordered to r/o PE and intra-abdominal pathology, showed no acute abdominopelvic abnormality, cholelithiasis, extensive adenopathy improved from prior PET 12/2023   - Amylase 15, lipase 4  - 2/17 AM Pt became febrile 38, developed new O2 requirement to 3L NC, reports chest pain, and has toxic ill appearance, pt briefly tachypneic 60s prior to PCA being started now  improved, reports feel worse than his ACS episode from last Feb 2023   - ACS is defined as radiographic evidence of consolidation plus fever > 38.5, New oxygen req, severe chest pain, Respiratory distress or new wheezing  - Both IR on-call attending and MICU consulted for urgent apheresis line placement, s/p apheresis line placement by IR in evening 2/17  - Transfusion Med consulted for urgent RBC exchange, s/p RBC exchange 2/17  - s/p Flagly once in ED, s/p Cefepime (2/16-2/19) and Vanc (2/16-2/19)   - 2/19 stopped IV atbs d/t hallucinations with cefepime (allergy to ceftriaxone and cross-sensitivity), started Levaquin (2/19-2/21)  - Blood cultures x2 (2/16 and 2/19 NGTD)  - Lactate level 1.7, Procal 0.82 (2/19)  - Duplex LLE (2/20) d/t severe LLE pain which was negative for DVT  - On exam 2/21, L calf extremely tender, hot, and very firm throughout  - CT L knee/tibia (2/21) showed great saphenous thrombophlebitis vs cellulitis  - 2/21 fever of 38.8, Levaquin stopped and started Zosyn (2/21-2/24). Repeat Bcx x2, UA, and CXR ordered  - CXR (2/21) neg for acute process, UA (2/21) neg  - Blood cultures x2 (2/21):  NGTD  - Encourage leg elevation and compression stocking as tolerated  - s/p IV Vanc (2/21-2/24)  - Transitioned to doxycycline 100mg BID (2/23-planned stop 2/25)     # Nodular lymphocyte predominant Hodgkins lymphoma (NLPHL)   - Primary Oncologist: Dr. Stoll-- emailed/updated on admit 2/16  - Enlarged lymph nodes noted 4/1/22  - RUQ US (11/14/22) with mildly enlarged LNs in the region of the kavin hepatis  - MRI liver (11/16/22) showed re-demonstration of bulky retroperitoneal lymphadenopathy and kavin hepatic lymphadenopathy    - (11/18/22) lymph node biopsy showed atypical lymphoid infiltrate. Reviewed by Hemepath board, insufficient for lymphoma diagnosis  - PET/CT (12/6/22) showing retroperitoneal lymphadenopathy  - Followed up with Dr. Stoll (12/16/22) with plan for surg/onc consult for large  tissue bx but patient missed apt and was lost to follow up  - Requested new apt with Dr. Ronnie Marte 6/19/23, patient was not seen and lost to follow up  - (11/28/23) CT A/P showed increased size of retroperitoneal lymph nodes reflecting extramedullary hematopoiesis I/s/o sickle cell vs lymphoma  - Paraaortic LN bx via IR (11/30/23) consistent with NLPHL. Flow: no clonal B cell or T cell population identified, lymphocyte 95%, CD3+CD4+ 68%, CD3+CD8+ 7%, CD19+ 24%  - Elevated LDH/bili partially from sickle cell disease   - Chemotherapy (R-CHOP) was discussed with primary oncologist Dr. Stoll, and decision was made to simplify his chemotherapy to Rituxan and prednisone q3 weeks mainly due to frequent sickle cell crisis  - Current chemo regimen: rituxan and prednisone q3 weeks (C1 1/18/24, C2 2/8/24, C3 due 2/29/24)  - 2/16 started Acyclovir 400mg BID prophy per Dr. Stoll     # Hx of nocturnal oxygen dependence and hypoxia on room air  - Historically improves with oxygen supplementation  - No O2 needs on admission, SpO2 was stable  - Has not had home oxygen for 2-3 years   - Pt did not qualify for home O2 per walking pulse ox performed during previous hospital admission on 2/14/24  - 2/17 Developed new 3L O2 requirement, on RA as of 2/20 with stable SpO2  - s/p continuous pulse ox without acute events  - Has pulm apt on 2/21/24     DVT prophy: Lovenox subcutaneous, SCDs, encourage ambulation     DISPO:  - Full Code  -  DC home pending improvement in pain and infectious process  - Infusion for C3 ritux 2/29. Sickle Cell FUV 3/20, Gastro 3/25, Pulm NPV 3/29     I spent >60 minutes in the professional and overall care of this patient.     Assessment and plan discussed with attending physician Dr. Alex Jenkins, APRN-CNP

## 2024-02-24 NOTE — CARE PLAN
Problem: Pain  Goal: My pain/discomfort is manageable  Outcome: Progressing     Problem: Safety  Goal: Patient will be injury free during hospitalization  Outcome: Progressing  Goal: I will remain free of falls  Outcome: Progressing     Problem: Daily Care  Goal: Daily care needs are met  Outcome: Progressing     Problem: Psychosocial Needs  Goal: Demonstrates ability to cope with hospitalization/illness  Outcome: Progressing  Goal: Collaborate with me, my family, and caregiver to identify my specific goals  Outcome: Progressing     Problem: Discharge Barriers  Goal: My discharge needs are met  Outcome: Progressing       The clinical goals for the shift include pt will report pain less than 8/10 throughout shift    Over the shift, the patient remained safe and free from injury. Had pain in L leg, medicated PRN, pain slightly improving throughout night. Vitals stable overnight. No acute events overnight

## 2024-02-25 ENCOUNTER — APPOINTMENT (OUTPATIENT)
Dept: RADIOLOGY | Facility: HOSPITAL | Age: 24
DRG: 811 | End: 2024-02-25
Payer: COMMERCIAL

## 2024-02-25 LAB
ABO GROUP (TYPE) IN BLOOD: NORMAL
ALBUMIN SERPL BCP-MCNC: 3.4 G/DL (ref 3.4–5)
ALP SERPL-CCNC: 294 U/L (ref 33–120)
ALT SERPL W P-5'-P-CCNC: 88 U/L (ref 10–52)
ANION GAP BLDA CALCULATED.4IONS-SCNC: 10 MMO/L (ref 10–25)
ANION GAP SERPL CALC-SCNC: 12 MMOL/L (ref 10–20)
ANTIBODY SCREEN: NORMAL
AST SERPL W P-5'-P-CCNC: 82 U/L (ref 9–39)
BACTERIA BLD CULT: NORMAL
BACTERIA BLD CULT: NORMAL
BASE EXCESS BLDA CALC-SCNC: 3.3 MMOL/L (ref -2–3)
BASOPHILS # BLD AUTO: 0.04 X10*3/UL (ref 0–0.1)
BASOPHILS NFR BLD AUTO: 0.4 %
BILIRUB SERPL-MCNC: 4 MG/DL (ref 0–1.2)
BODY TEMPERATURE: 37 DEGREES CELSIUS
BUN SERPL-MCNC: 5 MG/DL (ref 6–23)
CA-I BLDA-SCNC: 1.18 MMOL/L (ref 1.1–1.33)
CALCIUM SERPL-MCNC: 9.1 MG/DL (ref 8.6–10.6)
CARDIAC TROPONIN I PNL SERPL HS: <3 NG/L (ref 0–53)
CHLORIDE BLDA-SCNC: 102 MMOL/L (ref 98–107)
CHLORIDE SERPL-SCNC: 101 MMOL/L (ref 98–107)
CO2 SERPL-SCNC: 29 MMOL/L (ref 21–32)
CREAT SERPL-MCNC: 0.51 MG/DL (ref 0.5–1.3)
EGFRCR SERPLBLD CKD-EPI 2021: >90 ML/MIN/1.73M*2
EOSINOPHIL # BLD AUTO: 0.35 X10*3/UL (ref 0–0.7)
EOSINOPHIL NFR BLD AUTO: 3.1 %
ERYTHROCYTE [DISTWIDTH] IN BLOOD BY AUTOMATED COUNT: 18.5 % (ref 11.5–14.5)
GGT SERPL-CCNC: 183 U/L (ref 5–64)
GLUCOSE BLDA-MCNC: 95 MG/DL (ref 74–99)
GLUCOSE SERPL-MCNC: 100 MG/DL (ref 74–99)
HCO3 BLDA-SCNC: 27.8 MMOL/L (ref 22–26)
HCT VFR BLD AUTO: 27.2 % (ref 41–52)
HCT VFR BLD EST: 37 % (ref 41–52)
HGB BLD-MCNC: 8.5 G/DL (ref 13.5–17.5)
HGB BLDA-MCNC: 12.2 G/DL (ref 13.5–17.5)
HGB RETIC QN: 31 PG (ref 28–38)
IMM GRANULOCYTES # BLD AUTO: 0.16 X10*3/UL (ref 0–0.7)
IMM GRANULOCYTES NFR BLD AUTO: 1.4 % (ref 0–0.9)
IMMATURE RETIC FRACTION: 40.2 %
INHALED O2 CONCENTRATION: 21 %
LACTATE BLDA-SCNC: 0.9 MMOL/L (ref 0.4–2)
LDH SERPL L TO P-CCNC: 386 U/L (ref 84–246)
LYMPHOCYTES # BLD AUTO: 2.34 X10*3/UL (ref 1.2–4.8)
LYMPHOCYTES NFR BLD AUTO: 20.9 %
MCH RBC QN AUTO: 28.1 PG (ref 26–34)
MCHC RBC AUTO-ENTMCNC: 31.3 G/DL (ref 32–36)
MCV RBC AUTO: 90 FL (ref 80–100)
MONOCYTES # BLD AUTO: 1.13 X10*3/UL (ref 0.1–1)
MONOCYTES NFR BLD AUTO: 10.1 %
NEUTROPHILS # BLD AUTO: 7.19 X10*3/UL (ref 1.2–7.7)
NEUTROPHILS NFR BLD AUTO: 64.1 %
NRBC BLD-RTO: 0.7 /100 WBCS (ref 0–0)
OXYHGB MFR BLDA: 93.4 % (ref 94–98)
PCO2 BLDA: 41 MM HG (ref 38–42)
PH BLDA: 7.44 PH (ref 7.38–7.42)
PLATELET # BLD AUTO: 509 X10*3/UL (ref 150–450)
PO2 BLDA: 74 MM HG (ref 85–95)
POTASSIUM BLDA-SCNC: 3.8 MMOL/L (ref 3.5–5.3)
POTASSIUM SERPL-SCNC: 3.8 MMOL/L (ref 3.5–5.3)
PROT SERPL-MCNC: 6.3 G/DL (ref 6.4–8.2)
RBC # BLD AUTO: 3.02 X10*6/UL (ref 4.5–5.9)
RETICS #: 0.25 X10*6/UL (ref 0.02–0.12)
RETICS/RBC NFR AUTO: 8.2 % (ref 0.5–2)
RH FACTOR (ANTIGEN D): NORMAL
SAO2 % BLDA: 97 % (ref 94–100)
SODIUM BLDA-SCNC: 136 MMOL/L (ref 136–145)
SODIUM SERPL-SCNC: 138 MMOL/L (ref 136–145)
URATE SERPL-MCNC: 2 MG/DL (ref 4–7.5)
WBC # BLD AUTO: 11.2 X10*3/UL (ref 4.4–11.3)

## 2024-02-25 PROCEDURE — 2550000001 HC RX 255 CONTRASTS: Performed by: INTERNAL MEDICINE

## 2024-02-25 PROCEDURE — 80053 COMPREHEN METABOLIC PANEL: CPT | Performed by: NURSE PRACTITIONER

## 2024-02-25 PROCEDURE — 71045 X-RAY EXAM CHEST 1 VIEW: CPT

## 2024-02-25 PROCEDURE — 76705 ECHO EXAM OF ABDOMEN: CPT | Performed by: RADIOLOGY

## 2024-02-25 PROCEDURE — 71045 X-RAY EXAM CHEST 1 VIEW: CPT | Performed by: RADIOLOGY

## 2024-02-25 PROCEDURE — 2500000004 HC RX 250 GENERAL PHARMACY W/ HCPCS (ALT 636 FOR OP/ED)

## 2024-02-25 PROCEDURE — 86901 BLOOD TYPING SEROLOGIC RH(D): CPT | Performed by: NURSE PRACTITIONER

## 2024-02-25 PROCEDURE — 2500000001 HC RX 250 WO HCPCS SELF ADMINISTERED DRUGS (ALT 637 FOR MEDICARE OP)

## 2024-02-25 PROCEDURE — 99233 SBSQ HOSP IP/OBS HIGH 50: CPT | Performed by: NURSE PRACTITIONER

## 2024-02-25 PROCEDURE — 76705 ECHO EXAM OF ABDOMEN: CPT

## 2024-02-25 PROCEDURE — 82977 ASSAY OF GGT: CPT | Performed by: NURSE PRACTITIONER

## 2024-02-25 PROCEDURE — 84550 ASSAY OF BLOOD/URIC ACID: CPT | Performed by: NURSE PRACTITIONER

## 2024-02-25 PROCEDURE — 2500000001 HC RX 250 WO HCPCS SELF ADMINISTERED DRUGS (ALT 637 FOR MEDICARE OP): Performed by: NURSE PRACTITIONER

## 2024-02-25 PROCEDURE — 84484 ASSAY OF TROPONIN QUANT: CPT | Performed by: NURSE PRACTITIONER

## 2024-02-25 PROCEDURE — 2500000004 HC RX 250 GENERAL PHARMACY W/ HCPCS (ALT 636 FOR OP/ED): Performed by: NURSE PRACTITIONER

## 2024-02-25 PROCEDURE — 85025 COMPLETE CBC W/AUTO DIFF WBC: CPT | Performed by: NURSE PRACTITIONER

## 2024-02-25 PROCEDURE — 85045 AUTOMATED RETICULOCYTE COUNT: CPT | Performed by: NURSE PRACTITIONER

## 2024-02-25 PROCEDURE — 2500000002 HC RX 250 W HCPCS SELF ADMINISTERED DRUGS (ALT 637 FOR MEDICARE OP, ALT 636 FOR OP/ED): Performed by: NURSE PRACTITIONER

## 2024-02-25 PROCEDURE — 36415 COLL VENOUS BLD VENIPUNCTURE: CPT | Performed by: PHYSICIAN ASSISTANT

## 2024-02-25 PROCEDURE — 71275 CT ANGIOGRAPHY CHEST: CPT | Performed by: RADIOLOGY

## 2024-02-25 PROCEDURE — 1200000002 HC GENERAL ROOM WITH TELEMETRY DAILY

## 2024-02-25 PROCEDURE — 83615 LACTATE (LD) (LDH) ENZYME: CPT | Performed by: NURSE PRACTITIONER

## 2024-02-25 PROCEDURE — 71275 CT ANGIOGRAPHY CHEST: CPT

## 2024-02-25 PROCEDURE — 87040 BLOOD CULTURE FOR BACTERIA: CPT | Performed by: PHYSICIAN ASSISTANT

## 2024-02-25 PROCEDURE — 84132 ASSAY OF SERUM POTASSIUM: CPT | Performed by: NURSE PRACTITIONER

## 2024-02-25 RX ORDER — ACETAMINOPHEN 325 MG/1
650 TABLET ORAL ONCE
Status: COMPLETED | OUTPATIENT
Start: 2024-02-25 | End: 2024-02-25

## 2024-02-25 RX ORDER — DEXTROSE MONOHYDRATE AND SODIUM CHLORIDE 5; .45 G/100ML; G/100ML
75 INJECTION, SOLUTION INTRAVENOUS CONTINUOUS
Status: DISCONTINUED | OUTPATIENT
Start: 2024-02-25 | End: 2024-02-26

## 2024-02-25 RX ADMIN — HYDROMORPHONE HYDROCHLORIDE 4 MG: 2 INJECTION, SOLUTION INTRAMUSCULAR; INTRAVENOUS; SUBCUTANEOUS at 11:04

## 2024-02-25 RX ADMIN — HYDROMORPHONE HYDROCHLORIDE 4 MG: 2 INJECTION, SOLUTION INTRAMUSCULAR; INTRAVENOUS; SUBCUTANEOUS at 08:42

## 2024-02-25 RX ADMIN — ACYCLOVIR 400 MG: 400 TABLET ORAL at 20:36

## 2024-02-25 RX ADMIN — FOLIC ACID 1 MG: 1 TABLET ORAL at 08:43

## 2024-02-25 RX ADMIN — SENNOSIDES AND DOCUSATE SODIUM 2 TABLET: 8.6; 5 TABLET ORAL at 08:41

## 2024-02-25 RX ADMIN — PIPERACILLIN SODIUM AND TAZOBACTAM SODIUM 3.38 G: 3; .375 INJECTION, SOLUTION INTRAVENOUS at 20:36

## 2024-02-25 RX ADMIN — GABAPENTIN 300 MG: 300 CAPSULE ORAL at 20:44

## 2024-02-25 RX ADMIN — DOXYCYCLINE HYCLATE 100 MG: 100 TABLET, COATED ORAL at 11:04

## 2024-02-25 RX ADMIN — PIPERACILLIN SODIUM AND TAZOBACTAM SODIUM 3.38 G: 3; .375 INJECTION, SOLUTION INTRAVENOUS at 16:35

## 2024-02-25 RX ADMIN — IOHEXOL 71 ML: 350 INJECTION, SOLUTION INTRAVENOUS at 08:25

## 2024-02-25 RX ADMIN — PIPERACILLIN SODIUM AND TAZOBACTAM SODIUM 3.38 G: 3; .375 INJECTION, SOLUTION INTRAVENOUS at 11:04

## 2024-02-25 RX ADMIN — DOXYCYCLINE HYCLATE 100 MG: 100 TABLET, COATED ORAL at 20:36

## 2024-02-25 RX ADMIN — OXYCODONE HYDROCHLORIDE 15 MG: 5 TABLET ORAL at 07:14

## 2024-02-25 RX ADMIN — PANTOPRAZOLE SODIUM 40 MG: 40 TABLET, DELAYED RELEASE ORAL at 06:05

## 2024-02-25 RX ADMIN — ENOXAPARIN SODIUM 40 MG: 100 INJECTION SUBCUTANEOUS at 08:43

## 2024-02-25 RX ADMIN — HYDROMORPHONE HYDROCHLORIDE 4 MG: 2 INJECTION, SOLUTION INTRAMUSCULAR; INTRAVENOUS; SUBCUTANEOUS at 13:07

## 2024-02-25 RX ADMIN — HYDROMORPHONE HYDROCHLORIDE 4 MG: 2 INJECTION, SOLUTION INTRAMUSCULAR; INTRAVENOUS; SUBCUTANEOUS at 22:47

## 2024-02-25 RX ADMIN — HYDROMORPHONE HYDROCHLORIDE 4 MG: 2 INJECTION, SOLUTION INTRAMUSCULAR; INTRAVENOUS; SUBCUTANEOUS at 04:41

## 2024-02-25 RX ADMIN — ALLOPURINOL 300 MG: 300 TABLET ORAL at 08:43

## 2024-02-25 RX ADMIN — GABAPENTIN 300 MG: 300 CAPSULE ORAL at 13:07

## 2024-02-25 RX ADMIN — HYDROMORPHONE HYDROCHLORIDE 4 MG: 2 INJECTION, SOLUTION INTRAMUSCULAR; INTRAVENOUS; SUBCUTANEOUS at 18:17

## 2024-02-25 RX ADMIN — DEXTROSE AND SODIUM CHLORIDE 75 ML/HR: 5; 450 INJECTION, SOLUTION INTRAVENOUS at 13:07

## 2024-02-25 RX ADMIN — HYDROMORPHONE HYDROCHLORIDE 4 MG: 2 INJECTION, SOLUTION INTRAMUSCULAR; INTRAVENOUS; SUBCUTANEOUS at 15:12

## 2024-02-25 RX ADMIN — GABAPENTIN 300 MG: 300 CAPSULE ORAL at 06:05

## 2024-02-25 RX ADMIN — ACYCLOVIR 400 MG: 400 TABLET ORAL at 08:43

## 2024-02-25 RX ADMIN — ACETAMINOPHEN 650 MG: 325 TABLET ORAL at 18:19

## 2024-02-25 RX ADMIN — HYDROMORPHONE HYDROCHLORIDE 4 MG: 2 INJECTION, SOLUTION INTRAMUSCULAR; INTRAVENOUS; SUBCUTANEOUS at 20:36

## 2024-02-25 RX ADMIN — OXYCODONE HYDROCHLORIDE 15 MG: 5 TABLET ORAL at 02:53

## 2024-02-25 RX ADMIN — SENNOSIDES AND DOCUSATE SODIUM 2 TABLET: 8.6; 5 TABLET ORAL at 20:36

## 2024-02-25 ASSESSMENT — COGNITIVE AND FUNCTIONAL STATUS - GENERAL
MOBILITY SCORE: 22
DAILY ACTIVITIY SCORE: 24
MOBILITY SCORE: 23
CLIMB 3 TO 5 STEPS WITH RAILING: A LITTLE
WALKING IN HOSPITAL ROOM: A LITTLE
CLIMB 3 TO 5 STEPS WITH RAILING: A LITTLE
DAILY ACTIVITIY SCORE: 24

## 2024-02-25 ASSESSMENT — PAIN SCALES - GENERAL
PAINLEVEL_OUTOF10: 7
PAINLEVEL_OUTOF10: 8
PAINLEVEL_OUTOF10: 8
PAINLEVEL_OUTOF10: 7
PAINLEVEL_OUTOF10: 8
PAINLEVEL_OUTOF10: 7
PAINLEVEL_OUTOF10: 10 - WORST POSSIBLE PAIN
PAINLEVEL_OUTOF10: 7
PAINLEVEL_OUTOF10: 7
PAINLEVEL_OUTOF10: 9
PAINLEVEL_OUTOF10: 7
PAINLEVEL_OUTOF10: 8
PAINLEVEL_OUTOF10: 10 - WORST POSSIBLE PAIN
PAINLEVEL_OUTOF10: 8
PAINLEVEL_OUTOF10: 9
PAINLEVEL_OUTOF10: 7
PAINLEVEL_OUTOF10: 5 - MODERATE PAIN
PAINLEVEL_OUTOF10: 9
PAINLEVEL_OUTOF10: 10 - WORST POSSIBLE PAIN
PAINLEVEL_OUTOF10: 10 - WORST POSSIBLE PAIN

## 2024-02-25 ASSESSMENT — PAIN DESCRIPTION - LOCATION
LOCATION: LEG
LOCATION: CHEST
LOCATION: LEG
LOCATION: LEG

## 2024-02-25 NOTE — CARE PLAN
The patient's goals for the shift include      The clinical goals for the shift include pt will remain safe and free from injuries and falls throughout shift      Problem: Pain  Goal: My pain/discomfort is manageable  2/25/2024 1705 by Doyle Wren RN  Outcome: Progressing  2/25/2024 1210 by Doyle Wren RN  Outcome: Progressing     Problem: Safety  Goal: Patient will be injury free during hospitalization  2/25/2024 1705 by Doyle Wren RN  Outcome: Progressing  2/25/2024 1210 by Doyle Wren RN  Outcome: Progressing  Goal: I will remain free of falls  2/25/2024 1705 by Doyle Wren RN  Outcome: Progressing  2/25/2024 1210 by Doyle Wren RN  Outcome: Progressing     Problem: Daily Care  Goal: Daily care needs are met  2/25/2024 1705 by Doyle Wren RN  Outcome: Progressing  2/25/2024 1210 by Doyle Wren RN  Outcome: Progressing     Problem: Psychosocial Needs  Goal: Demonstrates ability to cope with hospitalization/illness  2/25/2024 1705 by Doyle Wren RN  Outcome: Progressing  2/25/2024 1210 by Doyle Wren RN  Outcome: Progressing  Goal: Collaborate with me, my family, and caregiver to identify my specific goals  2/25/2024 1705 by Doyle Wren RN  Outcome: Progressing  2/25/2024 1210 by Doyle Wren RN  Outcome: Progressing     Problem: Discharge Barriers  Goal: My discharge needs are met  2/25/2024 1705 by Doyle Wren RN  Outcome: Progressing  2/25/2024 1210 by Doyle Wren RN  Outcome: Progressing

## 2024-02-25 NOTE — PROGRESS NOTES
Joellen Narayan is a 23 y.o. male on day 8 of admission presenting with Sickle-cell disease with pain (CMS/HCC).    Subjective   Seen this AM at the bedside. Pt seen in significant respiratory distress- tachypneic/shallow breathing. Feels like it is worse on the left lung. Says he doesn't feel like it is pain related but does report he is having pain still. ABG showed Po2, placed pt on 2L NC. Sent for CT angio chest which showed worsening LLL opacities and mild RLL opacities. Discussed plan to restart atbs. Pt not happy that he is unable to be dc today. Denies any fevers/chills, n/v/d/c, or abdominal pain.    Objective     Physical Exam  Constitutional:       General: He is in acute distress.   HENT:      Head: Normocephalic.      Right Ear: External ear normal.      Left Ear: External ear normal.      Nose: Nose normal.   Eyes:      General: Scleral icterus present.      Extraocular Movements: Extraocular movements intact.      Pupils: Pupils are equal, round, and reactive to light.   Cardiovascular:      Rate and Rhythm: Regular rhythm.   Pulmonary:      Effort: Tachypnea and respiratory distress present.      Breath sounds: Examination of the left-upper field reveals decreased breath sounds. Examination of the left-middle field reveals decreased breath sounds. Examination of the left-lower field reveals decreased breath sounds. Decreased breath sounds present.   Abdominal:      General: Abdomen is flat. Bowel sounds are normal.      Palpations: Abdomen is soft.   Musculoskeletal:      Cervical back: Normal range of motion and neck supple.   Skin:     General: Skin is warm and dry.   Neurological:      General: No focal deficit present.      Mental Status: He is alert and oriented to person, place, and time. Mental status is at baseline.   Psychiatric:         Mood and Affect: Mood normal.         Behavior: Behavior normal.         Thought Content: Thought content normal.         Judgment: Judgment normal.  "      Last Recorded Vitals  Blood pressure 122/78, pulse 90, temperature 37.3 °C (99.1 °F), temperature source Temporal, resp. rate (!) 29, height 1.854 m (6' 1\"), weight 72.6 kg (160 lb), SpO2 99 %.  Intake/Output last 3 Shifts:  I/O last 3 completed shifts:  In: 150 (2.1 mL/kg) [P.O.:150]  Out: 725 (10 mL/kg) [Urine:725 (0.3 mL/kg/hr)]  Weight: 72.6 kg     Relevant Results        This patient has a central line   Reason for the central line remaining today?  Blood draws, infusions     .Scheduled medications  acyclovir, 400 mg, oral, q12h REYNALDO  allopurinol, 300 mg, oral, Daily  diclofenac sodium, 4 g, Topical, 4x daily  doxycylcine, 100 mg, oral, q12h REYNALDO  enoxaparin, 40 mg, subcutaneous, Daily  folic acid, 1 mg, oral, Daily  gabapentin, 300 mg, oral, q8h REYNALDO  lidocaine, 1 patch, transdermal, Daily  pantoprazole, 40 mg, oral, Daily before breakfast  piperacillin-tazobactam, 3.375 g, intravenous, q6h  polyethylene glycol, 17 g, oral, Daily  sennosides-docusate sodium, 2 tablet, oral, BID      Continuous medications       PRN medications  PRN medications: acetaminophen, albuterol, diphenhydrAMINE, HYDROmorphone, lactulose, naloxone, ondansetron, oxyCODONE    .  Results for orders placed or performed during the hospital encounter of 02/16/24 (from the past 24 hour(s))   CBC and Auto Differential   Result Value Ref Range    WBC 11.2 4.4 - 11.3 x10*3/uL    nRBC 0.7 (H) 0.0 - 0.0 /100 WBCs    RBC 3.02 (L) 4.50 - 5.90 x10*6/uL    Hemoglobin 8.5 (L) 13.5 - 17.5 g/dL    Hematocrit 27.2 (L) 41.0 - 52.0 %    MCV 90 80 - 100 fL    MCH 28.1 26.0 - 34.0 pg    MCHC 31.3 (L) 32.0 - 36.0 g/dL    RDW 18.5 (H) 11.5 - 14.5 %    Platelets 509 (H) 150 - 450 x10*3/uL    Neutrophils % 64.1 40.0 - 80.0 %    Immature Granulocytes %, Automated 1.4 (H) 0.0 - 0.9 %    Lymphocytes % 20.9 13.0 - 44.0 %    Monocytes % 10.1 2.0 - 10.0 %    Eosinophils % 3.1 0.0 - 6.0 %    Basophils % 0.4 0.0 - 2.0 %    Neutrophils Absolute 7.19 1.20 - 7.70 x10*3/uL "    Immature Granulocytes Absolute, Automated 0.16 0.00 - 0.70 x10*3/uL    Lymphocytes Absolute 2.34 1.20 - 4.80 x10*3/uL    Monocytes Absolute 1.13 (H) 0.10 - 1.00 x10*3/uL    Eosinophils Absolute 0.35 0.00 - 0.70 x10*3/uL    Basophils Absolute 0.04 0.00 - 0.10 x10*3/uL   Comprehensive Metabolic Panel   Result Value Ref Range    Glucose 100 (H) 74 - 99 mg/dL    Sodium 138 136 - 145 mmol/L    Potassium 3.8 3.5 - 5.3 mmol/L    Chloride 101 98 - 107 mmol/L    Bicarbonate 29 21 - 32 mmol/L    Anion Gap 12 10 - 20 mmol/L    Urea Nitrogen 5 (L) 6 - 23 mg/dL    Creatinine 0.51 0.50 - 1.30 mg/dL    eGFR >90 >60 mL/min/1.73m*2    Calcium 9.1 8.6 - 10.6 mg/dL    Albumin 3.4 3.4 - 5.0 g/dL    Alkaline Phosphatase 294 (H) 33 - 120 U/L    Total Protein 6.3 (L) 6.4 - 8.2 g/dL    AST 82 (H) 9 - 39 U/L    Bilirubin, Total 4.0 (H) 0.0 - 1.2 mg/dL    ALT 88 (H) 10 - 52 U/L   Lactate Dehydrogenase   Result Value Ref Range     (H) 84 - 246 U/L   Reticulocytes   Result Value Ref Range    Retic % 8.2 (H) 0.5 - 2.0 %    Retic Absolute 0.249 (H) 0.022 - 0.118 x10*6/uL    Reticulocyte Hemoglobin 31 28 - 38 pg    Immature Retic fraction 40.2 (H) <=16.0 %   Uric Acid   Result Value Ref Range    Uric Acid 2.0 (L) 4.0 - 7.5 mg/dL   BLOOD GAS ARTERIAL FULL PANEL   Result Value Ref Range    POCT pH, Arterial 7.44 (H) 7.38 - 7.42 pH    POCT pCO2, Arterial 41 38 - 42 mm Hg    POCT pO2, Arterial 74 (L) 85 - 95 mm Hg    POCT SO2, Arterial 97 94 - 100 %    POCT Oxy Hemoglobin, Arterial 93.4 (L) 94.0 - 98.0 %    POCT Hematocrit Calculated, Arterial 37.0 (L) 41.0 - 52.0 %    POCT Sodium, Arterial 136 136 - 145 mmol/L    POCT Potassium, Arterial 3.8 3.5 - 5.3 mmol/L    POCT Chloride, Arterial 102 98 - 107 mmol/L    POCT Ionized Calcium, Arterial 1.18 1.10 - 1.33 mmol/L    POCT Glucose, Arterial 95 74 - 99 mg/dL    POCT Lactate, Arterial 0.9 0.4 - 2.0 mmol/L    POCT Base Excess, Arterial 3.3 (H) -2.0 - 3.0 mmol/L    POCT HCO3 Calculated, Arterial  27.8 (H) 22.0 - 26.0 mmol/L    POCT Hemoglobin, Arterial 12.2 (L) 13.5 - 17.5 g/dL    POCT Anion Gap, Arterial 10 10 - 25 mmo/L    Patient Temperature 37.0 degrees Celsius    FiO2 21 %       Assessment/Plan   Active Problems:  There are no active Hospital Problems.    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 Of note, patient was just admitted for sickle cell pain management w/o complications (2/12-2/14). In ED, pt found to have mildly elevated temp 37.5 and leukocytosis (23.3k) higher than his baseline and atypical of his former sickle cell pain presentations. Of note, he was previously on prednisone for his lymphoma treatment (days 1-5) but last received on 2/13 and previous labs did not indicate such pronounced leukocytosis. CXR (2/16) w/o evidence of consolidation. COVID, Flu A/B (2/16) negative. Resp viral panel pending. Blood cultures x2 (2/16-NGTD). Started on Cefepime (2/16-2/19) and Vanc (2/16-2/19), s/p Flagly once in ED. Endorsed some RLQ abdominal pain in ED, and elevated D-dimer 6,363. CT angio chest/abd/pelvis neg for PE but showed L > R GGOs c/f infectious process vs acute chest syndrome. No acute abd pathology, substantial improvement in lymphadenopathy. 2/17 Pt developed new O2 requirement to 3L, became febrile 38, had significant chest pain, and toxic ill appearance meeting criteria for ACS. Transfusion Med consulted for urgent RBC exchange and IR consulted for apheresis line placement, s/p line placement/ RBC exchange 2/17 evening. 2/19 stopped IV atbs  d/t hallucinations with cefepime (allergy to ceftriaxone and cross-sensitivity), started Levaquin (2/19-2/21). s/p dPCA (2/17-2/19), transitioned to IVP dilaudid 2/19. Had a slight fever over night 2/19, repeat Bcx x2 NGTD, UA neg, continued Levaquin. Duplex LLE performed 2/20 d/t severe LLE pain which was negative for DVT. On exam 2/21, L calf extremely tender, hot, and very firm throughout. CT of the L knee/tibia and fibula ordered/pending to r/o any other process. Pt also had fever of 38.8 (2/21), Levaquin stopped and started Zosyn (2/21-2/24). Repeat Bcx x2, UA, and CXR ordered. CT L knee/tibia showed poss cellulitis, (2/21-2/24) started IV vancomycin. Transitioned to doxycycline (2/23-planned stop 2/25). Started rotating pain regimen with oxycodone and IV dilaudid 2/24. 2/25 pt with significant tachypnea, hypoxia on ABG. Placed on 2L NC. CT angio chest (2/25) negative for PE but showed worsening LLL opacities and mild RLL opacities. Restarted Zosyn (2/25-). DC home pending improvement in pain and infectious process.     # Hgb SS with severe pain  # Acute Chest Syndrome   # Cellulitis  - Follows in  sickle cell clinic, last seen on 1/23/24  - [place] reviewed, no aberrant behavior noted   - Not on any disease modifying medications (per outpatient note 1/23/24, was sent oxybryta rx for DMT but denied by insurance, must trial endari first, sent rx for endari but patient has yet to start it)  - Hgb baseline ~8.5, Hgb 8.5 (2/16) --> post RBC exchange Hgb 10 (2/18)--> Hgb 8.5 (2/25)  - Tbili baseline fluctuates ~5-13, Tbili 10.6 (2/18)--> 4 (2/25)  - LDH baseline fluctuates but ~500,  (2/18)--> 636 (2/20)--> 791 (2/21)--> 386 (2/25)  - No current care path  - Mildly elevated temp in ED of 37.5 and leukocytosis of 23.3k with left shift (2/16), baseline ~13 with previous sickle cell pain admissions; now improved  - s/p prednisone as part of chemo regimen on days 1-5 but last received 2/13 and labs did not  indicate such pronounced leukocytosis at that time   - 2V CXR (2/16) w/o evidence of consolidation  - COVID, Flu A/B (2/16): negative  - Resp viral panel (2/16): negative  - s/p on admit started IV dilaudid 4mg q2hrs PRN severe pain (2/16-2/17); was writhing in pain and appeared in significant distress  - (2/17-2/19) s/p dPCA  - 2/19 started IV dilaudid 4mg q2hrs PRN severe pain, increased 2/20 to 4.5mg  - s/p increased IV dilaudid to 6mg q2hrs PRN severe pain (2/21) d/t severe uncontrolled pain  - s/p IV dilaudid 4mg q2h PRN for pain (2/22-2/24)  - s/p oxycodone 15mg q4hr PRN severe pain and IV dilaudid 4mg q4hrs PRN severe pain to rotate for q2hr pain meds if needed (2/24-2/25)  - 2/25 started back on IV regimen only d/t uncontrolled pain- IV dilaudid 4mg q2hr PRN severe pain  - s/p IVP Toradol 30mg q6hrs x3 days (2/16-2/19) with Protonix for PPI prophy  - Continue home folic acid 1mg daily and gabapentin 300mg q8hrs  - Hgb S (2/16) 78.3%, 2/14 Hg S 80.3%, post-exchange Hgb S (2/19) 25.2%  - Utox (2/16) +MJ  - PO Zofran PRN for opioid-induced nausea, PO Benadryl PRN for opioid-induced pruritus  - Bowel regimen for opioid-induced constipation with DocuSenna 2tabs BID, Miralax daily, Lactulose BID PRN; LBM 2/24  - 2/16 CT PE showed negative PE but L > R basilar GGOs w/ more consolidative opacity within LLL c/f c/f infectious process vs acute chest syndrome  - Noted some RLQ abdominal pain in ED, and elevated D-dimer 6,363  - 2/16 CT A/P w/ contrast ordered to r/o PE and intra-abdominal pathology, showed no acute abdominopelvic abnormality, cholelithiasis, extensive adenopathy improved from prior PET 12/2023   - Amylase 15, lipase 4  - 2/17 AM Pt became febrile 38, developed new O2 requirement to 3L NC, reports chest pain, and has toxic ill appearance, pt briefly tachypneic 60s prior to PCA being started now improved, reports feel worse than his ACS episode from last Feb 2023   - ACS is defined as radiographic  evidence of consolidation plus fever > 38.5, New oxygen req, severe chest pain, Respiratory distress or new wheezing  - Both IR on-call attending and MICU consulted for urgent apheresis line placement, s/p apheresis line placement by IR in evening 2/17  - Transfusion Med consulted for urgent RBC exchange, s/p RBC exchange 2/17  - s/p Flagly once in ED, s/p Cefepime (2/16-2/19) and Vanc (2/16-2/19)   - 2/19 stopped IV atbs d/t hallucinations with cefepime (allergy to ceftriaxone and cross-sensitivity), started Levaquin (2/19-2/21)  - Blood cultures x2 (2/16 and 2/19 NGTD)  - Lactate level 1.7, Procal 0.82 (2/19)  - Duplex LLE (2/20) d/t severe LLE pain which was negative for DVT  - On exam 2/21, L calf extremely tender, hot, and very firm throughout  - CT L knee/tibia (2/21) showed great saphenous thrombophlebitis vs cellulitis  - 2/21 fever of 38.8, Levaquin stopped and started Zosyn (2/21-2/24). Repeat Bcx x2, UA, and CXR ordered  - CXR (2/21) neg for acute process, UA (2/21) neg  - Blood cultures x2 (2/21):  NGTD  - Encourage leg elevation and compression stocking as tolerated  - s/p IV Vanc (2/21-2/24)  - Transitioned to doxycycline 100mg BID (2/23-2/25)  - 2/25 pt with significant tachypnea, hypoxia on ABG. Placed on 2L NC  - CT angio chest (2/25) negative for PE but showed worsening LLL opacities and mild RLL opacities  - Restarted Zosyn (2/25-)  - Placed on telemetry (2/25), trop <3    # Nodular lymphocyte predominant Hodgkins lymphoma (NLPHL)   - Primary Oncologist: Dr. Stoll-- emailed/updated on admit 2/16  - Enlarged lymph nodes noted 4/1/22  - RUQ US (11/14/22) with mildly enlarged LNs in the region of the kavin hepatis  - MRI liver (11/16/22) showed re-demonstration of bulky retroperitoneal lymphadenopathy and kavin hepatic lymphadenopathy    - (11/18/22) lymph node biopsy showed atypical lymphoid infiltrate. Reviewed by Hemepath board, insufficient for lymphoma diagnosis  - PET/CT (12/6/22) showing  retroperitoneal lymphadenopathy  - Followed up with Dr. Stoll (12/16/22) with plan for surg/onc consult for large tissue bx but patient missed apt and was lost to follow up  - Requested new apt with Dr. Ronnie Marte 6/19/23, patient was not seen and lost to follow up  - (11/28/23) CT A/P showed increased size of retroperitoneal lymph nodes reflecting extramedullary hematopoiesis I/s/o sickle cell vs lymphoma  - Paraaortic LN bx via IR (11/30/23) consistent with NLPHL. Flow: no clonal B cell or T cell population identified, lymphocyte 95%, CD3+CD4+ 68%, CD3+CD8+ 7%, CD19+ 24%  - Elevated LDH/bili partially from sickle cell disease   - Chemotherapy (R-CHOP) was discussed with primary oncologist Dr. Stoll, and decision was made to simplify his chemotherapy to Rituxan and prednisone q3 weeks mainly due to frequent sickle cell crisis  - Current chemo regimen: rituxan and prednisone q3 weeks (C1 1/18/24, C2 2/8/24, C3 due 2/29/24)  - 2/16 started Acyclovir 400mg BID prophy per Dr. Stoll     # Hx of nocturnal oxygen dependence and hypoxia on room air  - Historically improves with oxygen supplementation  - No O2 needs on admission, SpO2 was stable  - Has not had home oxygen for 2-3 years   - Pt did not qualify for home O2 per walking pulse ox performed during previous hospital admission on 2/14/24  - 2/17 Developed new 3L O2 requirement, on RA as of 2/20 with stable SpO2 but back on 2L NC 2/25  - s/p continuous pulse ox without acute events  - Has pulm apt on 2/21/24     DVT prophy: Lovenox subcutaneous, SCDs, encourage ambulation     DISPO:  - Full Code  -  DC home pending improvement in pain and infectious process  - Infusion for C3 ritux 2/29. Sickle Cell FUV 3/20, Gastro 3/25, Pulm NPV 3/29     I spent >60 minutes in the professional and overall care of this patient.     Assessment and plan discussed with attending physician Dr. Alex Jenkins, APRN-CNP

## 2024-02-25 NOTE — CARE PLAN
Problem: Pain  Goal: My pain/discomfort is manageable  Outcome: Progressing     Problem: Safety  Goal: Patient will be injury free during hospitalization  Outcome: Progressing  Goal: I will remain free of falls  Outcome: Progressing     Problem: Daily Care  Goal: Daily care needs are met  Outcome: Progressing     Problem: Psychosocial Needs  Goal: Demonstrates ability to cope with hospitalization/illness  Outcome: Progressing  Goal: Collaborate with me, my family, and caregiver to identify my specific goals  Outcome: Progressing     Problem: Discharge Barriers  Goal: My discharge needs are met  Outcome: Progressing     The clinical goals for the shift include pt will rate pain less than 8/10 throughout shift    Over the shift, the patient remained safe and free from injury. c/o chest pain and shortness of breath overnight, satting over 92% on RA, labored shallow breathing at times. CXR and EKG completed overnight

## 2024-02-26 LAB
ALBUMIN SERPL BCP-MCNC: 3.3 G/DL (ref 3.4–5)
ALP SERPL-CCNC: 248 U/L (ref 33–120)
ALT SERPL W P-5'-P-CCNC: 62 U/L (ref 10–52)
ANION GAP SERPL CALC-SCNC: 13 MMOL/L (ref 10–20)
APPEARANCE UR: CLEAR
AST SERPL W P-5'-P-CCNC: 43 U/L (ref 9–39)
ATRIAL RATE: 86 BPM
ATRIAL RATE: 99 BPM
BASOPHILS # BLD AUTO: 0.06 X10*3/UL (ref 0–0.1)
BASOPHILS NFR BLD AUTO: 0.4 %
BILIRUB SERPL-MCNC: 3.9 MG/DL (ref 0–1.2)
BILIRUB UR STRIP.AUTO-MCNC: NEGATIVE MG/DL
BUN SERPL-MCNC: 3 MG/DL (ref 6–23)
CALCIUM SERPL-MCNC: 8.8 MG/DL (ref 8.6–10.6)
CHLORIDE SERPL-SCNC: 99 MMOL/L (ref 98–107)
CO2 SERPL-SCNC: 26 MMOL/L (ref 21–32)
COLOR UR: YELLOW
CREAT SERPL-MCNC: 0.53 MG/DL (ref 0.5–1.3)
EGFRCR SERPLBLD CKD-EPI 2021: >90 ML/MIN/1.73M*2
EOSINOPHIL # BLD AUTO: 0.38 X10*3/UL (ref 0–0.7)
EOSINOPHIL NFR BLD AUTO: 2.6 %
ERYTHROCYTE [DISTWIDTH] IN BLOOD BY AUTOMATED COUNT: 18.6 % (ref 11.5–14.5)
GLUCOSE SERPL-MCNC: 105 MG/DL (ref 74–99)
GLUCOSE UR STRIP.AUTO-MCNC: NORMAL MG/DL
HCT VFR BLD AUTO: 26.9 % (ref 41–52)
HEMOGLOBIN A2: 3 % (ref 2–3.5)
HEMOGLOBIN A: 70.3 % (ref 95.8–98)
HEMOGLOBIN F: 1 % (ref 0–2)
HEMOGLOBIN IDENTIFICATION INTERPRETATION: ABNORMAL
HEMOGLOBIN S: 25.7 %
HGB BLD-MCNC: 8.5 G/DL (ref 13.5–17.5)
HGB RETIC QN: 31 PG (ref 28–38)
HOLD SPECIMEN: NORMAL
IMM GRANULOCYTES # BLD AUTO: 0.09 X10*3/UL (ref 0–0.7)
IMM GRANULOCYTES NFR BLD AUTO: 0.6 % (ref 0–0.9)
IMMATURE RETIC FRACTION: 31.6 %
KETONES UR STRIP.AUTO-MCNC: NEGATIVE MG/DL
LDH SERPL L TO P-CCNC: 345 U/L (ref 84–246)
LEUKOCYTE ESTERASE UR QL STRIP.AUTO: NEGATIVE
LYMPHOCYTES # BLD AUTO: 2.2 X10*3/UL (ref 1.2–4.8)
LYMPHOCYTES NFR BLD AUTO: 14.8 %
MCH RBC QN AUTO: 28.6 PG (ref 26–34)
MCHC RBC AUTO-ENTMCNC: 31.6 G/DL (ref 32–36)
MCV RBC AUTO: 91 FL (ref 80–100)
MONOCYTES # BLD AUTO: 1.52 X10*3/UL (ref 0.1–1)
MONOCYTES NFR BLD AUTO: 10.2 %
NEUTROPHILS # BLD AUTO: 10.61 X10*3/UL (ref 1.2–7.7)
NEUTROPHILS NFR BLD AUTO: 71.4 %
NITRITE UR QL STRIP.AUTO: NEGATIVE
NRBC BLD-RTO: 0.7 /100 WBCS (ref 0–0)
P AXIS: 38 DEGREES
P AXIS: 51 DEGREES
P OFFSET: 181 MS
P OFFSET: 182 MS
P ONSET: 124 MS
P ONSET: 127 MS
PATH REVIEW-HGB IDENTIFICATION: ABNORMAL
PH UR STRIP.AUTO: 7 [PH]
PLATELET # BLD AUTO: 651 X10*3/UL (ref 150–450)
POTASSIUM SERPL-SCNC: 4.2 MMOL/L (ref 3.5–5.3)
PR INTERVAL: 176 MS
PR INTERVAL: 184 MS
PROT SERPL-MCNC: 5.5 G/DL (ref 6.4–8.2)
PROT UR STRIP.AUTO-MCNC: NEGATIVE MG/DL
Q ONSET: 215 MS
Q ONSET: 216 MS
QRS COUNT: 14 BEATS
QRS COUNT: 16 BEATS
QRS DURATION: 100 MS
QRS DURATION: 102 MS
QT INTERVAL: 354 MS
QT INTERVAL: 356 MS
QTC CALCULATION(BAZETT): 423 MS
QTC CALCULATION(BAZETT): 456 MS
QTC FREDERICIA: 399 MS
QTC FREDERICIA: 420 MS
R AXIS: 34 DEGREES
R AXIS: 54 DEGREES
RBC # BLD AUTO: 2.97 X10*6/UL (ref 4.5–5.9)
RBC # UR STRIP.AUTO: NEGATIVE /UL
RETICS #: 0.3 X10*6/UL (ref 0.02–0.12)
RETICS/RBC NFR AUTO: 10 % (ref 0.5–2)
SODIUM SERPL-SCNC: 134 MMOL/L (ref 136–145)
SP GR UR STRIP.AUTO: 1.01
T AXIS: 0 DEGREES
T AXIS: 15 DEGREES
T OFFSET: 393 MS
T OFFSET: 393 MS
UROBILINOGEN UR STRIP.AUTO-MCNC: NORMAL MG/DL
VENTRICULAR RATE: 86 BPM
VENTRICULAR RATE: 99 BPM
WBC # BLD AUTO: 14.9 X10*3/UL (ref 4.4–11.3)

## 2024-02-26 PROCEDURE — 2500000004 HC RX 250 GENERAL PHARMACY W/ HCPCS (ALT 636 FOR OP/ED): Performed by: NURSE PRACTITIONER

## 2024-02-26 PROCEDURE — 83021 HEMOGLOBIN CHROMOTOGRAPHY: CPT | Performed by: NURSE PRACTITIONER

## 2024-02-26 PROCEDURE — 99233 SBSQ HOSP IP/OBS HIGH 50: CPT | Performed by: NURSE PRACTITIONER

## 2024-02-26 PROCEDURE — 2500000002 HC RX 250 W HCPCS SELF ADMINISTERED DRUGS (ALT 637 FOR MEDICARE OP, ALT 636 FOR OP/ED): Performed by: NURSE PRACTITIONER

## 2024-02-26 PROCEDURE — 85045 AUTOMATED RETICULOCYTE COUNT: CPT | Performed by: NURSE PRACTITIONER

## 2024-02-26 PROCEDURE — 84075 ASSAY ALKALINE PHOSPHATASE: CPT | Performed by: NURSE PRACTITIONER

## 2024-02-26 PROCEDURE — 1200000002 HC GENERAL ROOM WITH TELEMETRY DAILY

## 2024-02-26 PROCEDURE — 2500000001 HC RX 250 WO HCPCS SELF ADMINISTERED DRUGS (ALT 637 FOR MEDICARE OP): Performed by: NURSE PRACTITIONER

## 2024-02-26 PROCEDURE — 81003 URINALYSIS AUTO W/O SCOPE: CPT | Performed by: PHYSICIAN ASSISTANT

## 2024-02-26 PROCEDURE — 2500000001 HC RX 250 WO HCPCS SELF ADMINISTERED DRUGS (ALT 637 FOR MEDICARE OP)

## 2024-02-26 PROCEDURE — 2500000005 HC RX 250 GENERAL PHARMACY W/O HCPCS

## 2024-02-26 PROCEDURE — 85025 COMPLETE CBC W/AUTO DIFF WBC: CPT | Performed by: NURSE PRACTITIONER

## 2024-02-26 PROCEDURE — 2500000004 HC RX 250 GENERAL PHARMACY W/ HCPCS (ALT 636 FOR OP/ED)

## 2024-02-26 PROCEDURE — 83020 HEMOGLOBIN ELECTROPHORESIS: CPT | Performed by: NURSE PRACTITIONER

## 2024-02-26 PROCEDURE — 83615 LACTATE (LD) (LDH) ENZYME: CPT | Performed by: NURSE PRACTITIONER

## 2024-02-26 RX ADMIN — ACYCLOVIR 400 MG: 400 TABLET ORAL at 08:41

## 2024-02-26 RX ADMIN — HYDROMORPHONE HYDROCHLORIDE 4 MG: 2 INJECTION, SOLUTION INTRAMUSCULAR; INTRAVENOUS; SUBCUTANEOUS at 05:29

## 2024-02-26 RX ADMIN — PIPERACILLIN SODIUM AND TAZOBACTAM SODIUM 3.38 G: 3; .375 INJECTION, SOLUTION INTRAVENOUS at 08:42

## 2024-02-26 RX ADMIN — GABAPENTIN 300 MG: 300 CAPSULE ORAL at 14:45

## 2024-02-26 RX ADMIN — HYDROMORPHONE HYDROCHLORIDE 4 MG: 2 INJECTION, SOLUTION INTRAMUSCULAR; INTRAVENOUS; SUBCUTANEOUS at 08:34

## 2024-02-26 RX ADMIN — ALLOPURINOL 300 MG: 300 TABLET ORAL at 08:41

## 2024-02-26 RX ADMIN — HYDROMORPHONE HYDROCHLORIDE 4 MG: 2 INJECTION, SOLUTION INTRAMUSCULAR; INTRAVENOUS; SUBCUTANEOUS at 01:05

## 2024-02-26 RX ADMIN — HYDROMORPHONE HYDROCHLORIDE 4 MG: 2 INJECTION, SOLUTION INTRAMUSCULAR; INTRAVENOUS; SUBCUTANEOUS at 19:53

## 2024-02-26 RX ADMIN — ENOXAPARIN SODIUM 40 MG: 100 INJECTION SUBCUTANEOUS at 08:40

## 2024-02-26 RX ADMIN — GABAPENTIN 300 MG: 300 CAPSULE ORAL at 05:29

## 2024-02-26 RX ADMIN — SENNOSIDES AND DOCUSATE SODIUM 2 TABLET: 8.6; 5 TABLET ORAL at 08:41

## 2024-02-26 RX ADMIN — LIDOCAINE 1 PATCH: 4 PATCH TOPICAL at 08:39

## 2024-02-26 RX ADMIN — HYDROMORPHONE HYDROCHLORIDE 4 MG: 2 INJECTION, SOLUTION INTRAMUSCULAR; INTRAVENOUS; SUBCUTANEOUS at 03:12

## 2024-02-26 RX ADMIN — HYDROMORPHONE HYDROCHLORIDE 4 MG: 2 INJECTION, SOLUTION INTRAMUSCULAR; INTRAVENOUS; SUBCUTANEOUS at 11:37

## 2024-02-26 RX ADMIN — FOLIC ACID 1 MG: 1 TABLET ORAL at 08:42

## 2024-02-26 RX ADMIN — PIPERACILLIN SODIUM AND TAZOBACTAM SODIUM 3.38 G: 3; .375 INJECTION, SOLUTION INTRAVENOUS at 14:44

## 2024-02-26 RX ADMIN — POLYETHYLENE GLYCOL 3350 17 G: 17 POWDER, FOR SOLUTION ORAL at 08:40

## 2024-02-26 RX ADMIN — GABAPENTIN 300 MG: 300 CAPSULE ORAL at 21:02

## 2024-02-26 RX ADMIN — PIPERACILLIN SODIUM AND TAZOBACTAM SODIUM 3.38 G: 3; .375 INJECTION, SOLUTION INTRAVENOUS at 03:12

## 2024-02-26 RX ADMIN — PANTOPRAZOLE SODIUM 40 MG: 40 TABLET, DELAYED RELEASE ORAL at 05:29

## 2024-02-26 RX ADMIN — HYDROMORPHONE HYDROCHLORIDE 4 MG: 2 INJECTION, SOLUTION INTRAMUSCULAR; INTRAVENOUS; SUBCUTANEOUS at 17:31

## 2024-02-26 RX ADMIN — PIPERACILLIN SODIUM AND TAZOBACTAM SODIUM 3.38 G: 3; .375 INJECTION, SOLUTION INTRAVENOUS at 21:01

## 2024-02-26 RX ADMIN — HYDROMORPHONE HYDROCHLORIDE 4 MG: 2 INJECTION, SOLUTION INTRAMUSCULAR; INTRAVENOUS; SUBCUTANEOUS at 14:44

## 2024-02-26 RX ADMIN — ACYCLOVIR 400 MG: 400 TABLET ORAL at 21:01

## 2024-02-26 RX ADMIN — SENNOSIDES AND DOCUSATE SODIUM 2 TABLET: 8.6; 5 TABLET ORAL at 21:02

## 2024-02-26 RX ADMIN — HYDROMORPHONE HYDROCHLORIDE 4 MG: 2 INJECTION, SOLUTION INTRAMUSCULAR; INTRAVENOUS; SUBCUTANEOUS at 22:22

## 2024-02-26 ASSESSMENT — COGNITIVE AND FUNCTIONAL STATUS - GENERAL
CLIMB 3 TO 5 STEPS WITH RAILING: A LITTLE
CLIMB 3 TO 5 STEPS WITH RAILING: A LITTLE
DAILY ACTIVITIY SCORE: 24
DAILY ACTIVITIY SCORE: 24
MOBILITY SCORE: 23
MOBILITY SCORE: 23
DAILY ACTIVITIY SCORE: 24
MOBILITY SCORE: 24

## 2024-02-26 ASSESSMENT — PAIN SCALES - GENERAL
PAINLEVEL_OUTOF10: 8
PAINLEVEL_OUTOF10: 8
PAINLEVEL_OUTOF10: 9
PAINLEVEL_OUTOF10: 9
PAINLEVEL_OUTOF10: 7
PAINLEVEL_OUTOF10: 9
PAINLEVEL_OUTOF10: 8
PAINLEVEL_OUTOF10: 6
PAINLEVEL_OUTOF10: 9
PAINLEVEL_OUTOF10: 6
PAINLEVEL_OUTOF10: 10 - WORST POSSIBLE PAIN
PAINLEVEL_OUTOF10: 7
PAINLEVEL_OUTOF10: 10 - WORST POSSIBLE PAIN
PAINLEVEL_OUTOF10: 9
PAINLEVEL_OUTOF10: 10 - WORST POSSIBLE PAIN
PAINLEVEL_OUTOF10: 8
PAINLEVEL_OUTOF10: 10 - WORST POSSIBLE PAIN
PAINLEVEL_OUTOF10: 5 - MODERATE PAIN
PAINLEVEL_OUTOF10: 8
PAINLEVEL_OUTOF10: 7

## 2024-02-26 ASSESSMENT — PAIN - FUNCTIONAL ASSESSMENT

## 2024-02-26 ASSESSMENT — PAIN DESCRIPTION - LOCATION
LOCATION: GENERALIZED
LOCATION: GENERALIZED

## 2024-02-26 NOTE — CARE PLAN
The patient's goals for the shift include      The clinical goals for the shift include Pt will rate pain <8 this shift

## 2024-02-26 NOTE — CARE PLAN
The patient's goals for the shift include      The clinical goals for the shift include Pt will rate pain <8 this shift      Problem: Pain  Goal: My pain/discomfort is manageable  Outcome: Progressing     Problem: Safety  Goal: Patient will be injury free during hospitalization  Outcome: Progressing  Goal: I will remain free of falls  Outcome: Progressing     Problem: Daily Care  Goal: Daily care needs are met  Outcome: Progressing     Problem: Psychosocial Needs  Goal: Demonstrates ability to cope with hospitalization/illness  Outcome: Progressing  Goal: Collaborate with me, my family, and caregiver to identify my specific goals  Outcome: Progressing     Problem: Discharge Barriers  Goal: My discharge needs are met  Outcome: Progressing

## 2024-02-26 NOTE — PROGRESS NOTES
Joellen Narayan is a 23 y.o. male on day 9 of admission presenting with Sickle-cell disease with pain (CMS/HCC).    Subjective   Feeling ok this morning, says it is hard to take a deep breath as it causes him pain. We discussed IS and splinting during deep breathing. He is still wearing O2 feels this helps his breathing. We discussed going outside today and walking around the floor. Discussed RUQ U/S results. Plan to keep zosyn and O2 today, and try to de- escalate both starting tomorrow.     Denies any HA, dizziness/lightheadedness, fevers/chills, cough, congestion, rhinorrhea, sore throat, palpitations, abdominal pain, n/v/d/c, melena, dysuria, urgency/frequency, hematuria, numbness/tingling, bruising/bleeding or rashes. Denies any sick contacts. ROS otherwise negative.      Objective     Physical Exam  Vitals reviewed.   Constitutional:       Appearance: Normal appearance.   HENT:      Head: Normocephalic and atraumatic.      Nose: Nose normal.      Mouth/Throat:      Mouth: Mucous membranes are moist.      Pharynx: Oropharynx is clear.   Eyes:      Extraocular Movements: Extraocular movements intact.      Pupils: Pupils are equal, round, and reactive to light.   Cardiovascular:      Rate and Rhythm: Normal rate and regular rhythm.      Pulses: Normal pulses.      Heart sounds: Normal heart sounds.   Pulmonary:      Effort: Pulmonary effort is normal.      Breath sounds: Normal breath sounds.   Abdominal:      General: Bowel sounds are normal.      Palpations: Abdomen is soft.   Musculoskeletal:         General: Normal range of motion.   Skin:     General: Skin is warm.   Neurological:      General: No focal deficit present.      Mental Status: He is alert and oriented to person, place, and time. Mental status is at baseline.   Psychiatric:         Mood and Affect: Mood normal.         Behavior: Behavior normal.       Last Recorded Vitals  Blood pressure 146/83, pulse 92, temperature 37.2 °C (99 °F), temperature  "source Temporal, resp. rate 22, height 1.854 m (6' 1\"), weight 72.6 kg (160 lb), SpO2 98 %.  Intake/Output last 3 Shifts:  I/O last 3 completed shifts:  In: 1061.3 (14.6 mL/kg) [I.V.:911.3 (12.6 mL/kg); IV Piggyback:150]  Out: 1400 (19.3 mL/kg) [Urine:1400 (0.5 mL/kg/hr)]  Weight: 72.6 kg        Assessment/Plan   Active Problems:  There are no active Hospital Problems.    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 Of note, patient was recently  admitted for sickle cell pain management w/o complications (2/12-2/14). In ED, pt had  mildly elevated temp 37.5 and leukocytosis (23.3k) higher than his baseline and atypical of his former sickle cell pain presentations. Of note, he was previously on prednisone for his lymphoma treatment (days 1-5, but last received on 2/13 and previous labs did not indicate such pronounced leukocytosis. CXR (2/16) w/o evidence of consolidation. COVID, RSV, Flu A/B (2/16) negative. Blood cultures x2 (2/16- no growth final). S/p Cefepime (2/16-2/19) and Vanc (2/16-2/19), s/p Flagly x1 in ED. Endorsed some RLQ abdominal pain in ED, and elevated D-dimer 6,363. CT angio chest/abd/pelvis neg for PE but showed L > R GGOs c/f infectious process vs acute chest syndrome. No acute abd pathology, substantial improvement in lymphadenopathy from prior. 2/17 Pt developed new O2 requirement to 3L, became febrile 38, had significant chest pain, meeting criteria for acute chest syndrome. S/p RBC exchange 2/17. 2/19 d/c'd IV atbs d/t reported hallucinations with " cefepime (allergy to ceftriaxone and cross-sensitivity), s/p Levaquin (2/19-2/21). S/p dPCA (2/17-2/19), transitioned to IVP dilaudid 2/19. Repeat fever over night 2/19 38C, repeat Bcx x2 no growth final, UA neg, continued Levaquin. Duplex LLE performed 2/20 d/t severe LLE pain which was negative for DVT. On exam 2/21, L calf extremely tender, hot, and very firm throughout. 2/21 CT of the L knee/tibia and fibula demonstrated possible cellulitis, s/p vanc 2/21- 2/24.  Levaquin (on 2/21) stopped and started Zosyn (2/21-2/24). Repeat Bcx x2, UA, and CXR ordered. S/p doxycycline (2/23-2/25). Started rotating pain regimen with oxycodone and IV dilaudid 2/24, but pt reported increased pain and back on IVP dilaudid only as of 2/25. 2/25 pt with significant tachypnea, hypoxia on ABG. Placed on 2L NC with improvement in SpO2 and tachypnea. CT angio chest (2/25) negative for PE but showed worsening LLL opacities and mild RLL opacities. Restarted Zosyn (2/25- current), plan to de- escalate to Levaquin possibly 2/27 if pt continues to improve. DC home pending improvement in pain and infectious process.     # Hgb SS with severe pain/ # Acute Chest Syndrome s/p exchange 2/17/ # Cellulitis  - Follows in  sickle cell clinic, last seen on 1/23/24  - [place]AR[place] reviewed, no aberrant behavior noted   - Not on any disease modifying medications (per outpatient note 1/23/24, was sent oxybryta rx for DMT but denied by insurance, must trial endari first, sent rx for endari but patient has yet to start it)  - Hgb baseline ~8.5, Hgb 8.5 (2/16) --> post RBC exchange Hgb 10 (2/18)--> Hgb 8.5 (2/26)  - Tbili baseline fluctuates ~5-13, Tbili 10.6 (2/18)--> 3.9 (2/26)  - LDH baseline fluctuates but ~500,  (2/18)--> 345 (2/26)  - No current care path  - Mildly elevated temp in ED of 37.5 and leukocytosis of 23.3k with left shift (2/16), baseline ~13 with previous sickle cell pain admissions; now improved  - s/p prednisone as part of chemo  regimen on days 1-5 but last received 2/13 and labs did not indicate such pronounced leukocytosis at that time   - 2V CXR (2/16) w/o evidence of consolidation  - COVID, Flu A/B, RSV (2/16): negative  - s/p IVP dilaudid 4mg q2hrs PRN severe pain (2/16-2/17); was writhing in pain and appeared in significant distress  - (2/17-2/19) s/p dPCA  - 2/19 started IV dilaudid 4mg q2hrs PRN severe pain, increased 2/20 to 4.5mg  - s/p increased IV dilaudid to 6mg q2hrs PRN severe pain (2/21) d/t severe uncontrolled pain  - s/p IV dilaudid 4mg q2h PRN for pain (2/22-2/24)  - s/p oxycodone 15mg q4hr PRN severe pain and IV dilaudid 4mg q4hrs PRN severe pain to rotate for q2hr pain meds if needed (2/24-2/25)  - 2/25- current back on IV regimen only d/t uncontrolled pain- IV dilaudid 4mg q2hr PRN severe pain  - s/p IVP Toradol 30mg q6hrs x3 days (2/16-2/19) with Protonix for PPI prophy  - Continue home folic acid 1mg daily and gabapentin 300mg q8hrs  - Hgb S (2/16) 78.3%, 2/14 Hg S 80.3%, post-exchange Hgb S (2/19) 25.2%  - Utox (2/16) +MJ  - PO Zofran PRN for opioid-induced nausea, PO Benadryl PRN for opioid-induced pruritus  - Bowel regimen for opioid-induced constipation with DocuSenna 2tabs BID, Miralax daily, Lactulose BID PRN; LBM 2/25  - 2/16 CT PE showed negative PE but L > R basilar GGOs w/ more consolidative opacity within LLL c/f c/f infectious process vs acute chest syndrome  - Noted some RLQ abdominal pain in ED, and elevated D-dimer 6,363  - 2/16 CT A/P w/ contrast ordered to r/o PE and intra-abdominal pathology, showed no acute abdominopelvic abnormality, cholelithiasis, extensive adenopathy improved from prior PET 12/2023   - Amylase 15, lipase 4  - 2/17 AM Pt became febrile 38, developed new O2 requirement to 3L NC, reports chest pain, and has toxic ill appearance, pt briefly tachypneic 60s prior to PCA being started now improved, reports feel worse than his ACS episode from last Feb 2023   - ACS is defined as  radiographic evidence of consolidation plus fever > 38.5, New oxygen req, severe chest pain, Respiratory distress or new wheezing  - Both IR on-call attending and MICU consulted for urgent apheresis line placement, s/p apheresis line placement by IR in evening 2/17  - Transfusion Med consulted for urgent RBC exchange, s/p RBC exchange 2/17  - s/p Flagly once in ED, s/p Cefepime (2/16-2/19) and Vanc (2/16-2/19)   - 2/19 stopped IV atbs d/t hallucinations with cefepime (allergy to ceftriaxone and cross-sensitivity), started Levaquin (2/19-2/21)  - Blood cultures x2 (2/16 and 2/19 NGTD)  - Lactate level 1.7, Procal 0.82 (2/19)  - Duplex LLE (2/20) d/t severe LLE pain which was negative for DVT  - On exam 2/21, L calf extremely tender, hot, and very firm throughout  - CT L knee/tibia (2/21) showed great saphenous thrombophlebitis vs cellulitis  - 2/21 fever of 38.8, Levaquin stopped and started Zosyn (2/21-2/24). Repeat Bcx x2, UA, and CXR ordered  - CXR (2/21) neg for acute process, UA (2/21) neg  - Blood cultures x2 (2/21):  NGTD  - Encourage leg elevation and compression stocking as tolerated  - s/p IV Vanc (2/21-2/24)  - s/p doxycycline 100mg BID (2/23-2/25)  - 2/25 pt with significant tachypnea, hypoxia on ABG. Placed on 2L NC with significant improvement in tachypnea and SpO2   - 2/25 RUQ U/S demonstrates cholelithiasis without cholecystitis   - CT angio chest (2/25) negative for PE but showed worsening LLL opacities and mild RLL opacities  - Restarted Zosyn (2/25- current). Plan transition to Levaquin 2/27 if continues to improve   - Placed on telemetry (2/25), trop <3  - repeat hgb S pending 2/26      # Nodular lymphocyte predominant Hodgkins lymphoma (NLPHL)   - Primary Oncologist: Dr. Stoll-- emailed/updated on admit 2/16  - Enlarged lymph nodes noted 4/1/22  - RUQ US (11/14/22) with mildly enlarged LNs in the region of the kavin hepatis  - MRI liver (11/16/22) showed re-demonstration of bulky  retroperitoneal lymphadenopathy and kavin hepatic lymphadenopathy    - (11/18/22) lymph node biopsy showed atypical lymphoid infiltrate. Reviewed by Hemepath board, insufficient for lymphoma diagnosis  - PET/CT (12/6/22) showing retroperitoneal lymphadenopathy  - Followed up with Dr. Stoll (12/16/22) with plan for surg/onc consult for large tissue bx but patient missed apt and was lost to follow up  - Requested new apt with Dr. Ronnie Marte 6/19/23, patient was not seen and lost to follow up  - (11/28/23) CT A/P showed increased size of retroperitoneal lymph nodes reflecting extramedullary hematopoiesis I/s/o sickle cell vs lymphoma  - Para- aortic LN bx via IR (11/30/23) consistent with NLPHL. Flow: no clonal B cell or T cell population identified, lymphocyte 95%, CD3+CD4+ 68%, CD3+CD8+ 7%, CD19+ 24%  - Elevated LDH/bili partially from sickle cell disease   - Chemotherapy (R-CHOP) was discussed with primary oncologist Dr. Stoll, and decision was made to simplify his chemotherapy to Rituxan and prednisone q3 weeks mainly due to frequent sickle cell crisis  - Current chemo regimen: rituxan and prednisone q3 weeks (C1 1/18/24, C2 2/8/24, C3 due 2/29/24)  - 2/16 started Acyclovir 400mg BID prophy per Dr. Stoll - pt has supply in room for home- going   - mild thrombocytosis noted 2/26 - likely reactive, continue to monitor      # Hx of nocturnal oxygen dependence and hypoxia on room air  - Historically improves with oxygen supplementation  - No O2 needs on admission, SpO2 was stable  - Has not had home oxygen for 2-3 years   - Pt did not qualify for home O2 per walking pulse ox performed during previous hospital admission on 2/14/24  - 2/17 Developed new 3L O2 requirement, on RA as of 2/20 with stable SpO2 but back on 2L NC 2/25  - s/p continuous pulse ox without acute events  - Has pulm apt on 2/21/24  - plan walking pulse ox 2/27 as nurse to begin O2 wean 2/27 AM     DVT prophy: Lovenox subcutaneous, SCDs,  encourage ambulation     DISPO:  - Full Code  - DC home pending improvement in pain and infectious process   - Infusion for C3 ritux 2/29. Sickle Cell FUV 3/20, Gastro 3/25, Pulm NPV 3/29     I spent >60 minutes in the professional and overall care of this patient.     Assessment and plan discussed with attending physician Dr. Alex Goetz, APRN-CNP

## 2024-02-26 NOTE — NURSING NOTE
2/26/24 Patient's O2 at rest without O2 is: 95%   Patient's O2 walking without O2 is: 92%    Patient's O2 at rest with O2 is: 97%  Patient's o2 walking with O2 is: 92%

## 2024-02-27 ENCOUNTER — APPOINTMENT (OUTPATIENT)
Dept: CARDIOLOGY | Facility: HOSPITAL | Age: 24
End: 2024-02-27
Payer: COMMERCIAL

## 2024-02-27 PROCEDURE — 97530 THERAPEUTIC ACTIVITIES: CPT | Mod: GP,CQ

## 2024-02-27 PROCEDURE — 2500000004 HC RX 250 GENERAL PHARMACY W/ HCPCS (ALT 636 FOR OP/ED): Performed by: NURSE PRACTITIONER

## 2024-02-27 PROCEDURE — 2500000001 HC RX 250 WO HCPCS SELF ADMINISTERED DRUGS (ALT 637 FOR MEDICARE OP): Performed by: NURSE PRACTITIONER

## 2024-02-27 PROCEDURE — 99233 SBSQ HOSP IP/OBS HIGH 50: CPT

## 2024-02-27 PROCEDURE — 1200000002 HC GENERAL ROOM WITH TELEMETRY DAILY

## 2024-02-27 PROCEDURE — 2500000002 HC RX 250 W HCPCS SELF ADMINISTERED DRUGS (ALT 637 FOR MEDICARE OP, ALT 636 FOR OP/ED): Performed by: NURSE PRACTITIONER

## 2024-02-27 PROCEDURE — 2500000001 HC RX 250 WO HCPCS SELF ADMINISTERED DRUGS (ALT 637 FOR MEDICARE OP)

## 2024-02-27 PROCEDURE — 2500000004 HC RX 250 GENERAL PHARMACY W/ HCPCS (ALT 636 FOR OP/ED)

## 2024-02-27 PROCEDURE — 97116 GAIT TRAINING THERAPY: CPT | Mod: GP,CQ

## 2024-02-27 PROCEDURE — 93005 ELECTROCARDIOGRAM TRACING: CPT

## 2024-02-27 RX ORDER — OXYCODONE HYDROCHLORIDE 5 MG/1
15 TABLET ORAL EVERY 4 HOURS PRN
Status: DISCONTINUED | OUTPATIENT
Start: 2024-02-27 | End: 2024-02-28 | Stop reason: HOSPADM

## 2024-02-27 RX ADMIN — PANTOPRAZOLE SODIUM 40 MG: 40 TABLET, DELAYED RELEASE ORAL at 05:25

## 2024-02-27 RX ADMIN — GABAPENTIN 300 MG: 300 CAPSULE ORAL at 13:49

## 2024-02-27 RX ADMIN — OXYCODONE HYDROCHLORIDE 15 MG: 5 TABLET ORAL at 20:10

## 2024-02-27 RX ADMIN — HYDROMORPHONE HYDROCHLORIDE 4 MG: 2 INJECTION, SOLUTION INTRAMUSCULAR; INTRAVENOUS; SUBCUTANEOUS at 05:25

## 2024-02-27 RX ADMIN — GABAPENTIN 300 MG: 300 CAPSULE ORAL at 05:26

## 2024-02-27 RX ADMIN — PIPERACILLIN SODIUM AND TAZOBACTAM SODIUM 3.38 G: 3; .375 INJECTION, SOLUTION INTRAVENOUS at 20:15

## 2024-02-27 RX ADMIN — HYDROMORPHONE HYDROCHLORIDE 4 MG: 2 INJECTION, SOLUTION INTRAMUSCULAR; INTRAVENOUS; SUBCUTANEOUS at 13:01

## 2024-02-27 RX ADMIN — HYDROMORPHONE HYDROCHLORIDE 4 MG: 2 INJECTION, SOLUTION INTRAMUSCULAR; INTRAVENOUS; SUBCUTANEOUS at 22:17

## 2024-02-27 RX ADMIN — ENOXAPARIN SODIUM 40 MG: 100 INJECTION SUBCUTANEOUS at 08:54

## 2024-02-27 RX ADMIN — GABAPENTIN 300 MG: 300 CAPSULE ORAL at 22:00

## 2024-02-27 RX ADMIN — PIPERACILLIN SODIUM AND TAZOBACTAM SODIUM 3.38 G: 3; .375 INJECTION, SOLUTION INTRAVENOUS at 08:54

## 2024-02-27 RX ADMIN — HYDROMORPHONE HYDROCHLORIDE 4 MG: 2 INJECTION, SOLUTION INTRAMUSCULAR; INTRAVENOUS; SUBCUTANEOUS at 17:37

## 2024-02-27 RX ADMIN — ACYCLOVIR 400 MG: 400 TABLET ORAL at 09:00

## 2024-02-27 RX ADMIN — HYDROMORPHONE HYDROCHLORIDE 4 MG: 2 INJECTION, SOLUTION INTRAMUSCULAR; INTRAVENOUS; SUBCUTANEOUS at 03:11

## 2024-02-27 RX ADMIN — PIPERACILLIN SODIUM AND TAZOBACTAM SODIUM 3.38 G: 3; .375 INJECTION, SOLUTION INTRAVENOUS at 03:12

## 2024-02-27 RX ADMIN — SENNOSIDES AND DOCUSATE SODIUM 2 TABLET: 8.6; 5 TABLET ORAL at 20:10

## 2024-02-27 RX ADMIN — FOLIC ACID 1 MG: 1 TABLET ORAL at 08:54

## 2024-02-27 RX ADMIN — OXYCODONE HYDROCHLORIDE 15 MG: 5 TABLET ORAL at 15:09

## 2024-02-27 RX ADMIN — HYDROMORPHONE HYDROCHLORIDE 4 MG: 2 INJECTION, SOLUTION INTRAMUSCULAR; INTRAVENOUS; SUBCUTANEOUS at 00:54

## 2024-02-27 RX ADMIN — ACYCLOVIR 400 MG: 400 TABLET ORAL at 20:10

## 2024-02-27 RX ADMIN — HYDROMORPHONE HYDROCHLORIDE 4 MG: 2 INJECTION, SOLUTION INTRAMUSCULAR; INTRAVENOUS; SUBCUTANEOUS at 08:54

## 2024-02-27 RX ADMIN — ALLOPURINOL 300 MG: 300 TABLET ORAL at 08:54

## 2024-02-27 ASSESSMENT — COGNITIVE AND FUNCTIONAL STATUS - GENERAL
MOBILITY SCORE: 24
MOBILITY SCORE: 24
WALKING IN HOSPITAL ROOM: A LITTLE
DAILY ACTIVITIY SCORE: 24
DAILY ACTIVITIY SCORE: 24
MOBILITY SCORE: 24
CLIMB 3 TO 5 STEPS WITH RAILING: A LITTLE
DAILY ACTIVITIY SCORE: 24
MOBILITY SCORE: 22

## 2024-02-27 ASSESSMENT — PAIN - FUNCTIONAL ASSESSMENT
PAIN_FUNCTIONAL_ASSESSMENT: 0-10

## 2024-02-27 ASSESSMENT — PAIN SCALES - GENERAL
PAINLEVEL_OUTOF10: 6
PAINLEVEL_OUTOF10: 6
PAINLEVEL_OUTOF10: 7
PAINLEVEL_OUTOF10: 8
PAINLEVEL_OUTOF10: 8
PAINLEVEL_OUTOF10: 5 - MODERATE PAIN
PAINLEVEL_OUTOF10: 8
PAINLEVEL_OUTOF10: 0 - NO PAIN
PAINLEVEL_OUTOF10: 8
PAINLEVEL_OUTOF10: 7
PAINLEVEL_OUTOF10: 5 - MODERATE PAIN
PAINLEVEL_OUTOF10: 8
PAINLEVEL_OUTOF10: 5 - MODERATE PAIN
PAINLEVEL_OUTOF10: 8
PAINLEVEL_OUTOF10: 7
PAINLEVEL_OUTOF10: 7
PAINLEVEL_OUTOF10: 6

## 2024-02-27 NOTE — CARE PLAN
Problem: Pain  Goal: My pain/discomfort is manageable  Outcome: Progressing     Problem: Safety  Goal: Patient will be injury free during hospitalization  Outcome: Progressing  Goal: I will remain free of falls  Outcome: Progressing     Problem: Psychosocial Needs  Goal: Demonstrates ability to cope with hospitalization/illness  Outcome: Progressing  Goal: Collaborate with me, my family, and caregiver to identify my specific goals  Outcome: Progressing   The patient's goals for the shift include      The clinical goals for the shift include pain will be less than 7 this shift

## 2024-02-27 NOTE — PROGRESS NOTES
Physical Therapy    Physical Therapy Treatment    Patient Name: Joellen Narayan  MRN: [id number]  Today's Date: 2/27/2024  Time Calculation  Start Time: 1542  Stop Time: 1612  Time Calculation (min): 30 min       Assessment/Plan   PT Assessment  End of Session Communication: Bedside nurse, Care Coordinator  Assessment Comment: pt demonstrates understanding of safe mobility techniques and verbalized understanding of HEP/ walking program upon discharge.  End of Session Patient Position: Bed, 2 rail up, Alarm off, not on at start of session     PT Plan  Treatment/Interventions: Bed mobility, Transfer training, Gait training, Stair training, Balance training, Neuromuscular re-education, Endurance training, Range of motion, Therapeutic exercise, Therapeutic activity, Home exercise program, Positioning, Postural re-education  PT Plan: Skilled PT  PT Frequency: 4 times per week  PT Discharge Recommendations: Low intensity level of continued care  Equipment Recommended upon Discharge: Wheeled walker (shower chair)  PT Recommended Transfer Status: Assist x1, Assistive device, Stand by assist (WW)  PT - OK to Discharge: Yes (PT evaluation completed and DC recs made)      General Visit Information:   PT  Visit  PT Received On: 02/27/24  General  Prior to Session Communication: Bedside nurse  Patient Position Received: Bed, 2 rail up, Alarm off, not on at start of session  General Comment: pt agreeable to session, pt demosntrates increased ambulation tolerance ambulating 500' using IV pole for stability. pt demonstrates safe technique with stair negotiation    Subjective   Precautions:  Precautions  Medical Precautions: Fall precautions  Vital Signs:       Objective   Pain:  Pain Assessment  Pain Score: 0 - No pain  Cognition:  Cognition  Orientation Level: Oriented X4       Treatments:       Bed Mobility 1  Bed Mobility 1: Supine to sitting, Sitting to supine  Level of Assistance 1: Independent    Ambulation/Gait Training  1  Device 1: IV Pole  Assistance 1: Close supervision  Quality of Gait 1: Narrow base of support  Comments/Distance (ft) 1: 500', 1x80', cues for posture.  Transfer 1  Technique 1: Sit to stand, Stand to sit  Transfer Level of Assistance 1: Independent    Stairs  Stairs: Yes  Stairs  Rails 1: Right  Curb Step 1: No  Assistance 1: Distant supervision  Comment/Number of Steps 1: cues for safe pacing, x5 stairs    Outcome Measures:  [facility] Basic Mobility  Turning from your back to your side while in a flat bed without using bedrails: None  Moving from lying on your back to sitting on the side of a flat bed without using bedrails: None  Moving to and from bed to chair (including a wheelchair): None  Standing up from a chair using your arms (e.g. wheelchair or bedside chair): None  To walk in hospital room: A little  Climbing 3-5 steps with railing: A little  Basic Mobility - Total Score: 22    Education Documentation  Body Mechanics, taught by Ramon Prieto PTA at 2/27/2024  5:09 PM.  Learner: Patient  Readiness: Acceptance  Method: Explanation  Response: Verbalizes Understanding    Education Comments  No comments found.        OP EDUCATION:       Encounter Problems       Encounter Problems (Active)       General Goals       ind with HEP focusing on Left knee/ankle ROM and strength within pain limits (Progressing)       Start:  02/23/24    Expected End:  03/02/24            AROM: Left knee flexion >100, Left knee extension WFL, Left ankle DF with KE >5 degrees with pain <2/10 (Progressing)       Start:  02/23/24    Expected End:  03/02/24            sit to/from stand, bed to/from chair with LRAD or no device independently, no pain (Progressing)       Start:  02/23/24    Expected End:  03/02/24               Mobility       ambulate 500' with LRAD or no device modified independent, pain <2 and no LOB (Progressing)       Start:  02/23/24    Expected End:  03/02/24            up/down 5 steps with 1 rail/cane modified  independent, no LOB, pain <2 (Progressing)       Start:  02/23/24    Expected End:  03/02/24            static stand no device >5 min independently, no pain, no LOB (Progressing)       Start:  02/23/24    Expected End:  03/02/24

## 2024-02-27 NOTE — CARE PLAN
The patient's goals for the shift include      The clinical goals for the shift include pain will be < 7      Problem: Pain  Goal: My pain/discomfort is manageable  Outcome: Progressing     Problem: Safety  Goal: Patient will be injury free during hospitalization  Outcome: Progressing  Goal: I will remain free of falls  Outcome: Progressing     Problem: Daily Care  Goal: Daily care needs are met  Outcome: Progressing     Problem: Psychosocial Needs  Goal: Demonstrates ability to cope with hospitalization/illness  Outcome: Progressing  Goal: Collaborate with me, my family, and caregiver to identify my specific goals  Outcome: Progressing     Problem: Discharge Barriers  Goal: My discharge needs are met  Outcome: Progressing

## 2024-02-27 NOTE — PROGRESS NOTES
"Joellen Narayan is a 23 y.o. male on day 10 of admission presenting with Sickle-cell disease with pain (CMS/HCC).    Subjective   Patient seen this AM at bedside. Patient feeling well today, states his pain is getting better each day. Patient stated his pain was located in his right leg and abdomen and stated his pain was about 7/10. Discussed with patient oxygen requirements, and beginning to rotate pain medication today. Denies any HA, dizziness/lightheadedness, fevers/chills, cough, congestion, rhinorrhea, sore throat, palpitations, abdominal pain, n/v/d/c, melena, dysuria, urgency/frequency, hematuria, numbness/tingling, bruising/bleeding or rashes. Denies any sick contacts. ROS otherwise negative.      Objective     Physical Exam  Vitals reviewed.   Constitutional:       Appearance: Normal appearance.   HENT:      Head: Normocephalic and atraumatic.      Nose: Nose normal.      Mouth/Throat:      Mouth: Mucous membranes are moist.      Pharynx: Oropharynx is clear.   Eyes:      Extraocular Movements: Extraocular movements intact.      Pupils: Pupils are equal, round, and reactive to light.   Cardiovascular:      Rate and Rhythm: Normal rate and regular rhythm.      Pulses: Normal pulses.      Heart sounds: Normal heart sounds.   Pulmonary:      Effort: Pulmonary effort is normal.      Breath sounds: Normal breath sounds.   Abdominal:      General: Bowel sounds are normal.      Palpations: Abdomen is soft.   Musculoskeletal:         General: Normal range of motion.   Skin:     General: Skin is warm.   Neurological:      General: No focal deficit present.      Mental Status: He is alert and oriented to person, place, and time. Mental status is at baseline.   Psychiatric:         Mood and Affect: Mood normal.         Behavior: Behavior normal.       Last Recorded Vitals  Blood pressure 118/67, pulse 84, temperature 36.3 °C (97.4 °F), temperature source Temporal, resp. rate 16, height 1.854 m (6' 1\"), weight 72.6 " kg (160 lb), SpO2 96 %.  Intake/Output last 3 Shifts:  I/O last 3 completed shifts:  In: 1728.5 (23.8 mL/kg) [P.O.:891; I.V.:737.5 (10.2 mL/kg); IV Piggyback:100]  Out: 2650 (36.5 mL/kg) [Urine:2650 (1 mL/kg/hr)]  Weight: 72.6 kg        Assessment/Plan   Active Problems:  There are no active Hospital Problems.    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 Of note, patient was recently  admitted for sickle cell pain management w/o complications (2/12-2/14). In ED, pt had  mildly elevated temp 37.5 and leukocytosis (23.3k) higher than his baseline and atypical of his former sickle cell pain presentations. Of note, he was previously on prednisone for his lymphoma treatment (days 1-5, but last received on 2/13 and previous labs did not indicate such pronounced leukocytosis. CXR (2/16) w/o evidence of consolidation. COVID, RSV, Flu A/B (2/16) negative. Blood cultures x2 (2/16- no growth final). S/p Cefepime (2/16-2/19) and Vanc (2/16-2/19), s/p Flagly x1 in ED. Endorsed some RLQ abdominal pain in ED, and elevated D-dimer 6,363. CT angio chest/abd/pelvis neg for PE but showed L > R GGOs c/f infectious process vs acute chest syndrome. No acute abd pathology, substantial improvement in lymphadenopathy from prior. 2/17 Pt developed new O2 requirement to 3L, became febrile 38, had significant chest pain, meeting criteria for acute chest syndrome. S/p RBC exchange 2/17. 2/19 d/c'd IV atbs d/t reported hallucinations with cefepime (allergy to ceftriaxone and cross-sensitivity), s/p  Levaquin (2/19-2/21). S/p dPCA (2/17-2/19), transitioned to IVP dilaudid 2/19. Repeat fever over night 2/19 38C, repeat Bcx x2 no growth final, UA neg, continued Levaquin. Duplex LLE performed 2/20 d/t severe LLE pain which was negative for DVT. On exam 2/21, L calf extremely tender, hot, and very firm throughout. 2/21 CT of the L knee/tibia and fibula demonstrated possible cellulitis, s/p vanc 2/21- 2/24.  Levaquin (on 2/21) stopped and started Zosyn (2/21-2/24). Repeat Bcx x2, UA, and CXR ordered. S/p doxycycline (2/23-2/25). Started rotating pain regimen with oxycodone and IV dilaudid 2/24, but pt reported increased pain and back on IVP dilaudid only as of 2/25. 2/25 pt with significant tachypnea, hypoxia on ABG. Placed on 2L NC with improvement in SpO2 and tachypnea (2/25-2/27). CT angio chest (2/25) negative for PE but showed worsening LLL opacities and mild RLL opacities. Restarted Zosyn (2/25- current), plan to de-escalate to Levaquin on discharge. On RA as of 2/27, walking pulse ox completed on 2/26 and patient does not qualify for oxygen on discharge. DC home pending improvement in pain and infectious process.     # Hgb SS with severe pain/ # Acute Chest Syndrome s/p exchange 2/17/ # Cellulitis  - Follows in  sickle cell clinic, last seen on 1/23/24  - OARRS reviewed, no aberrant behavior noted   - Not on any disease modifying medications (per outpatient note 1/23/24, was sent oxybryta rx for DMT but denied by insurance, must trial endari first, sent rx for endari but patient has yet to start it)  - Hgb baseline ~8.5, Hgb 8.5 (2/16) --> post RBC exchange Hgb 10 (2/18)--> Hgb 8.5 (2/26)  - Tbili baseline fluctuates ~5-13, Tbili 10.6 (2/18)--> 3.9 (2/26)  - LDH baseline fluctuates but ~500,  (2/18)--> 345 (2/26)  - No current care path  - Mildly elevated temp in ED of 37.5 and leukocytosis of 23.3k with left shift (2/16), baseline ~13 with previous sickle cell pain admissions; now improved  - s/p  prednisone as part of chemo regimen on days 1-5 but last received 2/13 and labs did not indicate such pronounced leukocytosis at that time   - 2V CXR (2/16) w/o evidence of consolidation  - COVID, Flu A/B, RSV (2/16): negative  - s/p IVP dilaudid 4mg q2hrs PRN severe pain (2/16-2/17); was writhing in pain and appeared in significant distress  - (2/17-2/19) s/p dPCA  - 2/19 started IV dilaudid 4mg q2hrs PRN severe pain, increased 2/20 to 4.5mg  - s/p increased IV dilaudid to 6mg q2hrs PRN severe pain (2/21) d/t severe uncontrolled pain  - s/p IV dilaudid 4mg q2h PRN for pain (2/22-2/24)  - s/p oxycodone 15mg q4hr PRN severe pain and IV dilaudid 4mg q4hrs PRN severe pain to rotate for q2hr pain meds if needed (2/24-2/25)  - s/p IV dilaudid 4mg q2hr PRN severe pain (2/25-2/27)  - restart oxycodone 15mg q4hr PRN severe pain and IV dilaudid 4mg q4hrs PRN severe pain to rotate q2hr (2/27)  - s/p IVP Toradol 30mg q6hrs x3 days (2/16-2/19) with Protonix for PPI prophy  - Continue home folic acid 1mg daily and gabapentin 300mg q8hrs  - Hgb S (2/16) 78.3%, 2/14 Hg S 80.3%, post-exchange Hgb S (2/19) 25.2%  - Utox (2/16) +MJ  - PO Zofran PRN for opioid-induced nausea, PO Benadryl PRN for opioid-induced pruritus  - Bowel regimen for opioid-induced constipation with DocuSenna 2tabs BID, Miralax daily, Lactulose BID PRN; LBM 2/25  - 2/16 CT PE showed negative PE but L > R basilar GGOs w/ more consolidative opacity within LLL c/f c/f infectious process vs acute chest syndrome  - Noted some RLQ abdominal pain in ED, and elevated D-dimer 6,363  - 2/16 CT A/P w/ contrast ordered to r/o PE and intra-abdominal pathology, showed no acute abdominopelvic abnormality, cholelithiasis, extensive adenopathy improved from prior PET 12/2023   - Amylase 15, lipase 4  - 2/17 AM Pt became febrile 38, developed new O2 requirement to 3L NC, reports chest pain, and has toxic ill appearance, pt briefly tachypneic 60s prior to PCA being started now  improved, reports feel worse than his ACS episode from last Feb 2023   - ACS is defined as radiographic evidence of consolidation plus fever > 38.5, New oxygen req, severe chest pain, Respiratory distress or new wheezing  - Both IR on-call attending and MICU consulted for urgent apheresis line placement, s/p apheresis line placement by IR in evening 2/17  - Transfusion Med consulted for urgent RBC exchange, s/p RBC exchange 2/17  - s/p Flagyl once in ED, s/p Cefepime (2/16-2/19) and Vanc (2/16-2/19)   - 2/19 stopped IV atbs d/t hallucinations with cefepime (allergy to ceftriaxone and cross-sensitivity), started Levaquin (2/19-2/21)  - Blood cultures x2 (2/16 and 2/19 NGTD)  - Lactate level 1.7, Procal 0.82 (2/19)  - Duplex LLE (2/20) d/t severe LLE pain which was negative for DVT  - On exam 2/21, L calf extremely tender, hot, and very firm throughout  - CT L knee/tibia (2/21) showed great saphenous thrombophlebitis vs cellulitis  - 2/21 fever of 38.8, Levaquin stopped and started Zosyn (2/21-2/24). Repeat Bcx x2, UA, and CXR ordered  - CXR (2/21) neg for acute process, UA (2/21) neg  - Blood cultures x2 (2/21):  NGTD  - Encourage leg elevation and compression stocking as tolerated  - s/p IV Vanc (2/21-2/24)  - s/p doxycycline 100mg BID (2/23-2/25)  - 2/25 pt with significant tachypnea, hypoxia on ABG. Placed on 2L NC with significant improvement in tachypnea and SpO2 --> pt now on RA (2/27)  - 2/25 RUQ U/S demonstrates cholelithiasis without cholecystitis   - CT angio chest (2/25) negative for PE but showed worsening LLL opacities and mild RLL opacities  - Restarted Zosyn (2/25- current), plan transition to Levaquin 2/28 if continues to improve   - Placed on telemetry (2/25), trop <3  - repeat hgb S 25.7 (2/26)      # Nodular lymphocyte predominant Hodgkins lymphoma (NLPHL)   - Primary Oncologist: Dr. Stoll-- emailed/updated on admit 2/16  - Enlarged lymph nodes noted 4/1/22  - RUQ US (11/14/22) with mildly  enlarged LNs in the region of the kavin hepatis  - MRI liver (11/16/22) showed re-demonstration of bulky retroperitoneal lymphadenopathy and kavin hepatic lymphadenopathy    - (11/18/22) lymph node biopsy showed atypical lymphoid infiltrate. Reviewed by Hemepath board, insufficient for lymphoma diagnosis  - PET/CT (12/6/22) showing retroperitoneal lymphadenopathy  - Followed up with Dr. Stoll (12/16/22) with plan for surg/onc consult for large tissue bx but patient missed apt and was lost to follow up  - Requested new apt with Dr. Ronnie Marte 6/19/23, patient was not seen and lost to follow up  - (11/28/23) CT A/P showed increased size of retroperitoneal lymph nodes reflecting extramedullary hematopoiesis I/s/o sickle cell vs lymphoma  - Para- aortic LN bx via IR (11/30/23) consistent with NLPHL. Flow: no clonal B cell or T cell population identified, lymphocyte 95%, CD3+CD4+ 68%, CD3+CD8+ 7%, CD19+ 24%  - Elevated LDH/bili partially from sickle cell disease   - Chemotherapy (R-CHOP) was discussed with primary oncologist Dr. Stoll, and decision was made to simplify his chemotherapy to Rituxan and prednisone q3 weeks mainly due to frequent sickle cell crisis  - Current chemo regimen: rituxan and prednisone q3 weeks (C1 1/18/24, C2 2/8/24, C3 due 2/29/24)  - 2/16 started Acyclovir 400mg BID prophy per Dr. Stoll - pt has supply in room for home- going   - mild thrombocytosis noted 2/26 - likely reactive, continue to monitor      # Hx of nocturnal oxygen dependence and hypoxia on room air  - Historically improves with oxygen supplementation  - No O2 needs on admission, SpO2 was stable  - Has not had home oxygen for 2-3 years   - Pt did not qualify for home O2 per walking pulse ox performed during previous hospital admission on 2/14/24  - 2/17- developed new 3L O2 requirement, on RA as of 2/20 with stable SpO2, back on 2L NC 2/25, on RA as of 2/27  - s/p continuous pulse ox without acute events  - has Pulm  appointment on 2/21/24  - walking pulse ox completed 2/26, pt does not qualify for home O2    DVT prophy: Lovenox subcutaneous, SCDs, encourage ambulation     DISPO:  - Full Code  - DC home pending improvement in pain and infectious process   - Infusion for C3 ritux 2/29, Sickle Cell FUV 3/20, Gastro 3/25, Pulm NPV 3/29     I spent >60 minutes in the professional and overall care of this patient.     Assessment and plan discussed with attending physician Dr. Alex Parnell.    Brenda Ocasio, APRN-CNP

## 2024-02-27 NOTE — PROGRESS NOTES
Music Therapy Note      Therapy Session  Referral Type: New referral this admission  Visit Type: New visit  Session Start Time: 1401  Session End Time: 1436  Intervention Delivery: In-person  Conflict of Service: None     Pre-assessment  Pain Score: 5 - Moderate pain  Stress Level (0-10): 3  Anxiety Level (0-10): 0  Coping Level (0-10): 8  Mood/Affect: Appropriate, Participative, Cooperative, Calm  Verbalized Emotional State: Relief, Relaxed, Hopefulness         Treatment/Interventions  Areas of Focus: Coping, Pain management, Stress reduction  Music Therapy Interventions: Music-assisted relaxation and imagery (SERA), Live music listening, Music sharing/discussion  Co-Treatment: Music therapy (MT Intern)  Interruption: No  Patient Fell Asleep at End of Session: No    Post-assessment  Pain Score: 5 - Moderate pain  Stress Level (0-10): 1  Anxiety Level (0-10): 0  Coping Level (0-10): 9  Mood/Affect: Participative, Cooperative, Calm, Appropriate  Verbalized Emotional State: Hopefulness, Enjoyment, Relaxed, Gratitude  Continue Visiting: Yes  Total Session Time (min): 35 minutes    Narrative  Assessment Detail: Pt presented awake, seated upright in bed. Pt welcomed MT/MTI in, and when asked, stated that he was feeling better and hoped to go home tomorrow. Pt was agreeable to music therapy at this time.  Plan: MT/MTI will engage pt in SERA and live music listening to assist with pain management, stress reduction, and coping.  Intervention: Pt passively participated in the SERA intervention, was engaged during the live music listening, and briefly held MTI's guitar and asked questions about the instruments.  Evaluation: Pt was open to a SERA intervention, so MTI gathered SPAC data from pt before beginning. MT played guitar while MTI led pt through a guided meditation. Pt was visibly participating in the meditation, and upon completion, pt expressed gratitude for the experience. Pt shared that he could visualize the images  "prompted by MTI, and he described them in detail. MTI collected SPAC data again, and stress levels decreased while coping skills increased. Pt asked if MT/MTI would play a song, so MT/MTI sang \"Easy.\" Pt commented, \"That was nice,\" and continued to compliment MT/MTI. Pt asked about MTI's guitar, so he briefly held it and asked questions about the type of guitar and sound it produced. Pt thanked MTs again for the experience.  Follow-up: MT to continue to follow.    Education Documentation  No documentation found.            "

## 2024-02-27 NOTE — CARE PLAN
The patient's goals for the shift include      The clinical goals for the shift include pain will be less than 7 this shift

## 2024-02-28 ENCOUNTER — PHARMACY VISIT (OUTPATIENT)
Dept: PHARMACY | Facility: CLINIC | Age: 24
End: 2024-02-28
Payer: MEDICARE

## 2024-02-28 VITALS
WEIGHT: 160 LBS | HEIGHT: 73 IN | SYSTOLIC BLOOD PRESSURE: 120 MMHG | BODY MASS INDEX: 21.2 KG/M2 | DIASTOLIC BLOOD PRESSURE: 71 MMHG | RESPIRATION RATE: 16 BRPM | HEART RATE: 99 BPM | OXYGEN SATURATION: 94 % | TEMPERATURE: 97.7 F

## 2024-02-28 DIAGNOSIS — C81.03 NODULAR LYMPHOCYTE PREDOMINANT HODGKIN LYMPHOMA OF INTRA-ABDOMINAL LYMPH NODES (MULTI): ICD-10-CM

## 2024-02-28 DIAGNOSIS — D57.00 SICKLE-CELL DISEASE WITH PAIN (MULTI): Primary | ICD-10-CM

## 2024-02-28 LAB
ALBUMIN SERPL BCP-MCNC: 3.5 G/DL (ref 3.4–5)
ALP SERPL-CCNC: 244 U/L (ref 33–120)
ALT SERPL W P-5'-P-CCNC: 40 U/L (ref 10–52)
ANION GAP SERPL CALC-SCNC: 16 MMOL/L (ref 10–20)
AST SERPL W P-5'-P-CCNC: 32 U/L (ref 9–39)
BASOPHILS # BLD AUTO: 0.07 X10*3/UL (ref 0–0.1)
BASOPHILS NFR BLD AUTO: 0.6 %
BILIRUB SERPL-MCNC: 4.1 MG/DL (ref 0–1.2)
BUN SERPL-MCNC: 4 MG/DL (ref 6–23)
CALCIUM SERPL-MCNC: 9.4 MG/DL (ref 8.6–10.6)
CHLORIDE SERPL-SCNC: 100 MMOL/L (ref 98–107)
CO2 SERPL-SCNC: 25 MMOL/L (ref 21–32)
CREAT SERPL-MCNC: 0.49 MG/DL (ref 0.5–1.3)
EGFRCR SERPLBLD CKD-EPI 2021: >90 ML/MIN/1.73M*2
EOSINOPHIL # BLD AUTO: 0.41 X10*3/UL (ref 0–0.7)
EOSINOPHIL NFR BLD AUTO: 3.3 %
ERYTHROCYTE [DISTWIDTH] IN BLOOD BY AUTOMATED COUNT: 17.9 % (ref 11.5–14.5)
GLUCOSE SERPL-MCNC: 90 MG/DL (ref 74–99)
HCT VFR BLD AUTO: 28 % (ref 41–52)
HGB BLD-MCNC: 9 G/DL (ref 13.5–17.5)
IMM GRANULOCYTES # BLD AUTO: 0.07 X10*3/UL (ref 0–0.7)
IMM GRANULOCYTES NFR BLD AUTO: 0.6 % (ref 0–0.9)
LYMPHOCYTES # BLD AUTO: 3 X10*3/UL (ref 1.2–4.8)
LYMPHOCYTES NFR BLD AUTO: 24.2 %
MCH RBC QN AUTO: 28.5 PG (ref 26–34)
MCHC RBC AUTO-ENTMCNC: 32.1 G/DL (ref 32–36)
MCV RBC AUTO: 89 FL (ref 80–100)
MONOCYTES # BLD AUTO: 1.2 X10*3/UL (ref 0.1–1)
MONOCYTES NFR BLD AUTO: 9.7 %
NEUTROPHILS # BLD AUTO: 7.65 X10*3/UL (ref 1.2–7.7)
NEUTROPHILS NFR BLD AUTO: 61.6 %
NRBC BLD-RTO: 0.2 /100 WBCS (ref 0–0)
PLATELET # BLD AUTO: 897 X10*3/UL (ref 150–450)
POTASSIUM SERPL-SCNC: 4.2 MMOL/L (ref 3.5–5.3)
PROT SERPL-MCNC: 6.1 G/DL (ref 6.4–8.2)
RBC # BLD AUTO: 3.16 X10*6/UL (ref 4.5–5.9)
SODIUM SERPL-SCNC: 137 MMOL/L (ref 136–145)
WBC # BLD AUTO: 12.4 X10*3/UL (ref 4.4–11.3)

## 2024-02-28 PROCEDURE — 2500000001 HC RX 250 WO HCPCS SELF ADMINISTERED DRUGS (ALT 637 FOR MEDICARE OP): Performed by: NURSE PRACTITIONER

## 2024-02-28 PROCEDURE — 85025 COMPLETE CBC W/AUTO DIFF WBC: CPT

## 2024-02-28 PROCEDURE — 2500000004 HC RX 250 GENERAL PHARMACY W/ HCPCS (ALT 636 FOR OP/ED)

## 2024-02-28 PROCEDURE — RXMED WILLOW AMBULATORY MEDICATION CHARGE

## 2024-02-28 PROCEDURE — 2500000004 HC RX 250 GENERAL PHARMACY W/ HCPCS (ALT 636 FOR OP/ED): Performed by: NURSE PRACTITIONER

## 2024-02-28 PROCEDURE — 2500000001 HC RX 250 WO HCPCS SELF ADMINISTERED DRUGS (ALT 637 FOR MEDICARE OP)

## 2024-02-28 PROCEDURE — 99239 HOSP IP/OBS DSCHRG MGMT >30: CPT

## 2024-02-28 PROCEDURE — 80053 COMPREHEN METABOLIC PANEL: CPT

## 2024-02-28 PROCEDURE — 2500000002 HC RX 250 W HCPCS SELF ADMINISTERED DRUGS (ALT 637 FOR MEDICARE OP, ALT 636 FOR OP/ED): Performed by: NURSE PRACTITIONER

## 2024-02-28 RX ORDER — DIPHENHYDRAMINE HYDROCHLORIDE 50 MG/ML
50 INJECTION INTRAMUSCULAR; INTRAVENOUS AS NEEDED
Status: CANCELLED | OUTPATIENT
Start: 2024-03-13

## 2024-02-28 RX ORDER — ACETAMINOPHEN 325 MG/1
650 TABLET ORAL ONCE
Status: CANCELLED | OUTPATIENT
Start: 2024-03-13

## 2024-02-28 RX ORDER — LEVOFLOXACIN 750 MG/1
750 TABLET ORAL DAILY
Qty: 1 TABLET | Refills: 0 | Status: SHIPPED | OUTPATIENT
Start: 2024-02-28 | End: 2024-03-01

## 2024-02-28 RX ORDER — DIPHENHYDRAMINE HCL 50 MG
50 CAPSULE ORAL ONCE
Status: CANCELLED | OUTPATIENT
Start: 2024-03-13

## 2024-02-28 RX ORDER — FAMOTIDINE 10 MG/ML
20 INJECTION INTRAVENOUS ONCE AS NEEDED
Status: CANCELLED | OUTPATIENT
Start: 2024-03-13

## 2024-02-28 RX ORDER — OXYCODONE HYDROCHLORIDE 15 MG/1
15 TABLET ORAL EVERY 6 HOURS PRN
Qty: 20 TABLET | Refills: 0 | Status: SHIPPED | OUTPATIENT
Start: 2024-02-28 | End: 2024-02-29 | Stop reason: SDUPTHER

## 2024-02-28 RX ORDER — ALBUTEROL SULFATE 0.83 MG/ML
3 SOLUTION RESPIRATORY (INHALATION) AS NEEDED
Status: CANCELLED | OUTPATIENT
Start: 2024-03-13

## 2024-02-28 RX ORDER — EPINEPHRINE 0.3 MG/.3ML
0.3 INJECTION SUBCUTANEOUS EVERY 5 MIN PRN
Status: CANCELLED | OUTPATIENT
Start: 2024-03-13

## 2024-02-28 RX ADMIN — PIPERACILLIN SODIUM AND TAZOBACTAM SODIUM 3.38 G: 3; .375 INJECTION, SOLUTION INTRAVENOUS at 09:22

## 2024-02-28 RX ADMIN — HYDROMORPHONE HYDROCHLORIDE 4 MG: 2 INJECTION, SOLUTION INTRAMUSCULAR; INTRAVENOUS; SUBCUTANEOUS at 11:01

## 2024-02-28 RX ADMIN — ALLOPURINOL 300 MG: 300 TABLET ORAL at 09:22

## 2024-02-28 RX ADMIN — HYDROMORPHONE HYDROCHLORIDE 4 MG: 2 INJECTION, SOLUTION INTRAMUSCULAR; INTRAVENOUS; SUBCUTANEOUS at 04:51

## 2024-02-28 RX ADMIN — FOLIC ACID 1 MG: 1 TABLET ORAL at 09:22

## 2024-02-28 RX ADMIN — OXYCODONE HYDROCHLORIDE 15 MG: 5 TABLET ORAL at 09:26

## 2024-02-28 RX ADMIN — GABAPENTIN 300 MG: 300 CAPSULE ORAL at 04:50

## 2024-02-28 RX ADMIN — OXYCODONE HYDROCHLORIDE 15 MG: 5 TABLET ORAL at 03:06

## 2024-02-28 RX ADMIN — PIPERACILLIN SODIUM AND TAZOBACTAM SODIUM 3.38 G: 3; .375 INJECTION, SOLUTION INTRAVENOUS at 03:06

## 2024-02-28 RX ADMIN — PANTOPRAZOLE SODIUM 40 MG: 40 TABLET, DELAYED RELEASE ORAL at 04:50

## 2024-02-28 RX ADMIN — ACYCLOVIR 400 MG: 400 TABLET ORAL at 09:22

## 2024-02-28 RX ADMIN — ENOXAPARIN SODIUM 40 MG: 100 INJECTION SUBCUTANEOUS at 09:22

## 2024-02-28 ASSESSMENT — PAIN SCALES - GENERAL
PAINLEVEL_OUTOF10: 7
PAINLEVEL_OUTOF10: 8
PAINLEVEL_OUTOF10: 7
PAINLEVEL_OUTOF10: 7
PAINLEVEL_OUTOF10: 6
PAINLEVEL_OUTOF10: 7

## 2024-02-28 ASSESSMENT — PAIN - FUNCTIONAL ASSESSMENT
PAIN_FUNCTIONAL_ASSESSMENT: 0-10

## 2024-02-28 ASSESSMENT — COGNITIVE AND FUNCTIONAL STATUS - GENERAL
DAILY ACTIVITIY SCORE: 24
MOBILITY SCORE: 24

## 2024-02-28 ASSESSMENT — PAIN DESCRIPTION - ORIENTATION: ORIENTATION: LEFT

## 2024-02-28 ASSESSMENT — PAIN DESCRIPTION - LOCATION: LOCATION: GENERALIZED

## 2024-02-28 NOTE — PROGRESS NOTES
Physical Therapy                 Therapy Communication Note    Patient Name: Joellen Narayan  MRN: [id number]  Today's Date: 2/28/2024     Discipline: Physical Therapy    Missed Visit Reason: Missed Visit Reason: Other (Comment) (This note serves as communication note/ PT d/w treating PT and TCC and updated goals, DC equipment recs and POC/frequency as noted.)

## 2024-02-28 NOTE — PROGRESS NOTES
1430: I/J line removed by attending team, iv's removed, AVS printed and given to patient, med's to bed delivered at bedside, pt left via wheelchair transport with belongings and med's in lyft to home address on file.

## 2024-02-28 NOTE — DISCHARGE SUMMARY
Discharge Diagnosis  Sickle-cell disease with pain (CMS/HCC)    Issues Requiring Follow-Up  Nodular lymphocyte predominant Hodgkins lymphoma    Hospital Course  Joellen Narayan is a 23 y.o. 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 Of note, patient was recently admitted for sickle cell pain management w/o complications (2/12-2/14). In ED, pt had mildly elevated temp 37.5 and leukocytosis (23.3k) higher than his baseline and atypical of his former sickle cell pain presentations. Of note, he was previously on prednisone for his lymphoma treatment (days 1-5, but last received on 2/13 and previous labs did not indicate such pronounced leukocytosis. CXR (2/16) w/o evidence of consolidation. COVID, RSV, Flu A/B (2/16) negative. Blood cultures x2 (2/16- no growth final). S/p Cefepime (2/16-2/19) and Vanc (2/16-2/19), s/p Flagyl x1 in ED. Endorsed some RLQ abdominal pain in ED, and elevated D-dimer 6,363. CT angio chest/abd/pelvis neg for PE but showed L > R GGOs c/f infectious process vs acute chest syndrome. No acute abd pathology, substantial improvement in lymphadenopathy from prior. 2/17 Pt developed new O2 requirement to 3L, became febrile 38, had significant chest pain, meeting criteria for acute chest syndrome. S/p RBC exchange 2/17. 2/19 d/c'd IV atbs d/t reported hallucinations with cefepime (allergy to ceftriaxone and cross-sensitivity), s/p Levaquin (2/19-2/21). S/p dPCA (2/17-2/19), transitioned to IVP dilaudid 2/19. Repeat fever over night 2/19 38C, repeat Bcx x2 no growth final, UA neg,  continued Levaquin. Duplex LLE performed 2/20 d/t severe LLE pain which was negative for DVT. On exam 2/21, L calf extremely tender, hot, and very firm throughout. 2/21 CT of the L knee/tibia and fibula demonstrated possible cellulitis, s/p vanc 2/21- 2/24.  Levaquin (on 2/21) stopped and started Zosyn (2/21-2/24). Repeat Bcx x2, UA, and CXR ordered. S/p doxycycline (2/23-2/25). Started rotating pain regimen with oxycodone and IV dilaudid 2/24, but pt reported increased pain and back on IVP dilaudid only as of 2/25. 2/25 pt with significant tachypnea, hypoxia on ABG. Placed on 2L NC with improvement in SpO2 and tachypnea (2/25-2/27). CT angio chest (2/25) negative for PE but showed worsening LLL opacities and mild RLL opacities. Restarted Zosyn (2/25-2/28). Discharging patient with Levaquin X1 day to complete 5-day course of antibiotics (2/25-2/29). Patient now on RA, walking pulse ox completed on 2/26 and patient does not qualify for oxygen on discharge.     Patient discharged in stable condition. Patient has Ritux infusion 2/29, and FUV with sickle cell 3/20. Oxycodone prescription sent by outpatient provider.    Pertinent Physical Exam At Time of Discharge  Physical Exam  Vitals reviewed.   Constitutional:       Appearance: Normal appearance.   HENT:      Head: Normocephalic and atraumatic.      Nose: Nose normal.      Mouth/Throat:      Mouth: Mucous membranes are moist.      Pharynx: Oropharynx is clear.   Eyes:      Extraocular Movements: Extraocular movements intact.      Pupils: Pupils are equal, round, and reactive to light.   Cardiovascular:      Rate and Rhythm: Normal rate and regular rhythm.      Pulses: Normal pulses.      Heart sounds: Normal heart sounds.   Pulmonary:      Effort: Pulmonary effort is normal.      Breath sounds: Normal breath sounds.   Abdominal:      General: Bowel sounds are normal.      Palpations: Abdomen is soft.   Musculoskeletal:         General: Normal range of motion.    Skin:     General: Skin is warm.   Neurological:      General: No focal deficit present.      Mental Status: He is alert and oriented to person, place, and time. Mental status is at baseline.   Psychiatric:         Mood and Affect: Mood normal.         Behavior: Behavior normal.       Home Medications     Medication List      START taking these medications     acyclovir 400 mg tablet; Commonly known as: Zovirax; Take 1 tablet (400   mg) by mouth 2 times a day.   levoFLOXacin 750 mg tablet; Commonly known as: Levaquin; Take 1 tablet   (750 mg) by mouth once daily for 1 day.     CHANGE how you take these medications     oxyCODONE 15 mg immediate release tablet; Commonly known as: Roxicodone;   Take 1 tablet (15 mg) by mouth every 6 hours if needed for severe pain (7   - 10).; What changed: medication strength, how much to take     CONTINUE taking these medications     Endari 5 gram powder in packet; Generic drug: glutamine (sickle cell);   Take 15 g by mouth 2 times a day.   folic acid 1 mg tablet; Commonly known as: Folvite; Take 1 tablet (1 mg)   by mouth once daily.   OLANZapine 5 mg tablet; Commonly known as: ZyPREXA; Take 1 tablet (5 mg)   by mouth once daily at bedtime For 4 days starting the evening of   treatment.   ondansetron 8 mg tablet; Commonly known as: Zofran; Take 1 tablet (8 mg)   by mouth every 8 hours if needed for nausea or vomiting.   polyethylene glycol 17 gram packet; Commonly known as: Glycolax, Miralax   predniSONE 50 mg tablet; Commonly known as: Deltasone; Take 2 tablets   (100 mg) by mouth on Days 1, 2, 3, 4, and 5 of treatment cycle.   prochlorperazine 10 mg tablet; Commonly known as: Compazine; Take 1   tablet (10 mg) by mouth every 6 hours if needed for nausea or vomiting.   sennosides-docusate sodium 8.6-50 mg tablet; Commonly known as:   Promise-Colace     STOP taking these medications     Sudogest 30 mg tablet; Generic drug: pseudoephedrine     ASK your doctor about these medications      allopurinol 300 mg tablet; Commonly known as: Zyloprim; Take 1 tablet   (300 mg) by mouth once daily.; Ask about: Should I take this medication?       Outpatient Follow-Up  Future Appointments   Date Time Provider Department Center   2/29/2024  9:00 AM [id number]   3/20/2024 11:00 AM Renee Barrera, APRN-CNP [id number] [facility]   3/25/2024  1:40 PM Sade Loza PA-C [id number] Academic   3/29/2024  1:00 PM Chloe Sheth, APRN-CNP [id number] Academic     I spent >60 minutes on the professional and overall care of this patient.    Assessment and plan discussed with attending physician Dr. Parnell.       Brenda Ocasio, APRN-CNP

## 2024-02-28 NOTE — CARE PLAN
The patient's goals for the shift include      The clinical goals for the shift include pt will remain HDS and VSS    Problem: Pain  Goal: My pain/discomfort is manageable  Outcome: Progressing     Problem: Safety  Goal: Patient will be injury free during hospitalization  Outcome: Progressing  Goal: I will remain free of falls  Outcome: Progressing     Problem: Daily Care  Goal: Daily care needs are met  Outcome: Progressing     Problem: Psychosocial Needs  Goal: Demonstrates ability to cope with hospitalization/illness  Outcome: Progressing  Goal: Collaborate with me, my family, and caregiver to identify my specific goals  Outcome: Progressing     Problem: Discharge Barriers  Goal: My discharge needs are met  Outcome: Progressing

## 2024-02-28 NOTE — CARE PLAN
The patient's goals for the shift include    Problem: Pain  Goal: My pain/discomfort is manageable  Outcome: Progressing     Problem: Safety  Goal: Patient will be injury free during hospitalization  Outcome: Progressing  Goal: I will remain free of falls  Outcome: Progressing     Problem: Daily Care  Goal: Daily care needs are met  Outcome: Progressing     Problem: Psychosocial Needs  Goal: Demonstrates ability to cope with hospitalization/illness  Outcome: Progressing  Goal: Collaborate with me, my family, and caregiver to identify my specific goals  Outcome: Progressing     Problem: Discharge Barriers  Goal: My discharge needs are met  Outcome: Progressing       The clinical goals for the shift include patient will rate pain less than 6/10 throughout shift.

## 2024-02-29 ENCOUNTER — SOCIAL WORK (OUTPATIENT)
Dept: HEMATOLOGY/ONCOLOGY | Facility: HOSPITAL | Age: 24
End: 2024-02-29
Payer: COMMERCIAL

## 2024-02-29 ENCOUNTER — APPOINTMENT (OUTPATIENT)
Dept: RADIOLOGY | Facility: HOSPITAL | Age: 24
End: 2024-02-29
Payer: COMMERCIAL

## 2024-02-29 ENCOUNTER — HOSPITAL ENCOUNTER (OUTPATIENT)
Facility: HOSPITAL | Age: 24
Setting detail: OBSERVATION
LOS: 1 days | Discharge: HOME | End: 2024-03-01
Attending: EMERGENCY MEDICINE | Admitting: INTERNAL MEDICINE
Payer: COMMERCIAL

## 2024-02-29 DIAGNOSIS — D57.00 SICKLE CELL DISEASE WITH CRISIS (MULTI): Primary | ICD-10-CM

## 2024-02-29 DIAGNOSIS — D57.00 SICKLE-CELL DISEASE WITH PAIN (MULTI): ICD-10-CM

## 2024-02-29 DIAGNOSIS — D57.00 SICKLE CELL CRISIS (MULTI): ICD-10-CM

## 2024-02-29 LAB
ALBUMIN SERPL BCP-MCNC: 4.3 G/DL (ref 3.4–5)
ALP SERPL-CCNC: 224 U/L (ref 33–120)
ALT SERPL W P-5'-P-CCNC: 31 U/L (ref 10–52)
ANION GAP SERPL CALC-SCNC: 16 MMOL/L (ref 10–20)
AST SERPL W P-5'-P-CCNC: 30 U/L (ref 9–39)
BACTERIA BLD CULT: NORMAL
BASOPHILS # BLD AUTO: 0.06 X10*3/UL (ref 0–0.1)
BASOPHILS NFR BLD AUTO: 0.3 %
BILIRUB DIRECT SERPL-MCNC: 0.9 MG/DL (ref 0–0.3)
BILIRUB SERPL-MCNC: 3.6 MG/DL (ref 0–1.2)
BUN SERPL-MCNC: 5 MG/DL (ref 6–23)
CALCIUM SERPL-MCNC: 10 MG/DL (ref 8.6–10.6)
CHLORIDE SERPL-SCNC: 101 MMOL/L (ref 98–107)
CO2 SERPL-SCNC: 23 MMOL/L (ref 21–32)
CREAT SERPL-MCNC: 0.46 MG/DL (ref 0.5–1.3)
EGFRCR SERPLBLD CKD-EPI 2021: >90 ML/MIN/1.73M*2
EOSINOPHIL # BLD AUTO: 0.25 X10*3/UL (ref 0–0.7)
EOSINOPHIL NFR BLD AUTO: 1.4 %
ERYTHROCYTE [DISTWIDTH] IN BLOOD BY AUTOMATED COUNT: 17.5 % (ref 11.5–14.5)
GLUCOSE SERPL-MCNC: 93 MG/DL (ref 74–99)
HCT VFR BLD AUTO: 20.5 % (ref 41–52)
HGB BLD-MCNC: 7.2 G/DL (ref 13.5–17.5)
HGB RETIC QN: 31 PG (ref 28–38)
IMM GRANULOCYTES # BLD AUTO: 0.13 X10*3/UL (ref 0–0.7)
IMM GRANULOCYTES NFR BLD AUTO: 0.7 % (ref 0–0.9)
IMMATURE RETIC FRACTION: 18.4 %
LDH SERPL L TO P-CCNC: 365 U/L (ref 84–246)
LYMPHOCYTES # BLD AUTO: 2.41 X10*3/UL (ref 1.2–4.8)
LYMPHOCYTES NFR BLD AUTO: 13.7 %
MCH RBC QN AUTO: 28.7 PG (ref 26–34)
MCHC RBC AUTO-ENTMCNC: 35.1 G/DL (ref 32–36)
MCV RBC AUTO: 82 FL (ref 80–100)
MONOCYTES # BLD AUTO: 1.07 X10*3/UL (ref 0.1–1)
MONOCYTES NFR BLD AUTO: 6.1 %
NEUTROPHILS # BLD AUTO: 13.64 X10*3/UL (ref 1.2–7.7)
NEUTROPHILS NFR BLD AUTO: 77.8 %
NRBC BLD-RTO: 0.2 /100 WBCS (ref 0–0)
PLATELET # BLD AUTO: 1509 X10*3/UL (ref 150–450)
POTASSIUM SERPL-SCNC: 3.4 MMOL/L (ref 3.5–5.3)
PROT SERPL-MCNC: 8 G/DL (ref 6.4–8.2)
RBC # BLD AUTO: 2.51 X10*6/UL (ref 4.5–5.9)
RETICS #: 0.16 X10*6/UL (ref 0.02–0.12)
RETICS/RBC NFR AUTO: 6.3 % (ref 0.5–2)
SODIUM SERPL-SCNC: 137 MMOL/L (ref 136–145)
WBC # BLD AUTO: 17.6 X10*3/UL (ref 4.4–11.3)

## 2024-02-29 PROCEDURE — 83615 LACTATE (LD) (LDH) ENZYME: CPT | Performed by: STUDENT IN AN ORGANIZED HEALTH CARE EDUCATION/TRAINING PROGRAM

## 2024-02-29 PROCEDURE — 96374 THER/PROPH/DIAG INJ IV PUSH: CPT | Mod: 59

## 2024-02-29 PROCEDURE — 80053 COMPREHEN METABOLIC PANEL: CPT

## 2024-02-29 PROCEDURE — 36415 COLL VENOUS BLD VENIPUNCTURE: CPT

## 2024-02-29 PROCEDURE — 85025 COMPLETE CBC W/AUTO DIFF WBC: CPT

## 2024-02-29 PROCEDURE — 93010 ELECTROCARDIOGRAM REPORT: CPT | Performed by: EMERGENCY MEDICINE

## 2024-02-29 PROCEDURE — 85045 AUTOMATED RETICULOCYTE COUNT: CPT | Performed by: STUDENT IN AN ORGANIZED HEALTH CARE EDUCATION/TRAINING PROGRAM

## 2024-02-29 PROCEDURE — 96376 TX/PRO/DX INJ SAME DRUG ADON: CPT | Mod: 59

## 2024-02-29 PROCEDURE — 99285 EMERGENCY DEPT VISIT HI MDM: CPT | Performed by: EMERGENCY MEDICINE

## 2024-02-29 PROCEDURE — 85060 BLOOD SMEAR INTERPRETATION: CPT

## 2024-02-29 PROCEDURE — 99285 EMERGENCY DEPT VISIT HI MDM: CPT

## 2024-02-29 PROCEDURE — 71046 X-RAY EXAM CHEST 2 VIEWS: CPT | Performed by: RADIOLOGY

## 2024-02-29 PROCEDURE — 96375 TX/PRO/DX INJ NEW DRUG ADDON: CPT | Mod: 59

## 2024-02-29 PROCEDURE — 36620 INSERTION CATHETER ARTERY: CPT

## 2024-02-29 PROCEDURE — 71046 X-RAY EXAM CHEST 2 VIEWS: CPT

## 2024-02-29 PROCEDURE — 82248 BILIRUBIN DIRECT: CPT | Performed by: STUDENT IN AN ORGANIZED HEALTH CARE EDUCATION/TRAINING PROGRAM

## 2024-02-29 PROCEDURE — 2500000004 HC RX 250 GENERAL PHARMACY W/ HCPCS (ALT 636 FOR OP/ED): Performed by: EMERGENCY MEDICINE

## 2024-02-29 PROCEDURE — 2500000004 HC RX 250 GENERAL PHARMACY W/ HCPCS (ALT 636 FOR OP/ED)

## 2024-02-29 RX ORDER — HYDROMORPHONE HYDROCHLORIDE 1 MG/ML
1 INJECTION, SOLUTION INTRAMUSCULAR; INTRAVENOUS; SUBCUTANEOUS
Status: COMPLETED | OUTPATIENT
Start: 2024-02-29 | End: 2024-03-01

## 2024-02-29 RX ORDER — OXYCODONE HYDROCHLORIDE 15 MG/1
15 TABLET ORAL EVERY 6 HOURS PRN
Qty: 20 TABLET | Refills: 0 | Status: SHIPPED | OUTPATIENT
Start: 2024-02-29

## 2024-02-29 RX ORDER — KETOROLAC TROMETHAMINE 15 MG/ML
15 INJECTION, SOLUTION INTRAMUSCULAR; INTRAVENOUS ONCE
Status: COMPLETED | OUTPATIENT
Start: 2024-02-29 | End: 2024-02-29

## 2024-02-29 RX ORDER — KETOROLAC TROMETHAMINE 30 MG/ML
30 INJECTION, SOLUTION INTRAMUSCULAR; INTRAVENOUS ONCE
Status: DISCONTINUED | OUTPATIENT
Start: 2024-02-29 | End: 2024-02-29

## 2024-02-29 RX ADMIN — HYDROMORPHONE HYDROCHLORIDE 1 MG: 1 INJECTION, SOLUTION INTRAMUSCULAR; INTRAVENOUS; SUBCUTANEOUS at 22:02

## 2024-02-29 RX ADMIN — KETOROLAC TROMETHAMINE 15 MG: 15 INJECTION, SOLUTION INTRAMUSCULAR; INTRAVENOUS at 23:36

## 2024-02-29 RX ADMIN — HYDROMORPHONE HYDROCHLORIDE 1 MG: 1 INJECTION, SOLUTION INTRAMUSCULAR; INTRAVENOUS; SUBCUTANEOUS at 23:25

## 2024-02-29 ASSESSMENT — COLUMBIA-SUICIDE SEVERITY RATING SCALE - C-SSRS
2. HAVE YOU ACTUALLY HAD ANY THOUGHTS OF KILLING YOURSELF?: NO
1. IN THE PAST MONTH, HAVE YOU WISHED YOU WERE DEAD OR WISHED YOU COULD GO TO SLEEP AND NOT WAKE UP?: NO
6. HAVE YOU EVER DONE ANYTHING, STARTED TO DO ANYTHING, OR PREPARED TO DO ANYTHING TO END YOUR LIFE?: NO

## 2024-02-29 ASSESSMENT — PAIN SCALES - GENERAL
PAINLEVEL_OUTOF10: 10 - WORST POSSIBLE PAIN

## 2024-02-29 ASSESSMENT — PAIN - FUNCTIONAL ASSESSMENT
PAIN_FUNCTIONAL_ASSESSMENT: 0-10

## 2024-03-01 VITALS
RESPIRATION RATE: 14 BRPM | OXYGEN SATURATION: 100 % | TEMPERATURE: 98.6 F | DIASTOLIC BLOOD PRESSURE: 94 MMHG | HEART RATE: 78 BPM | SYSTOLIC BLOOD PRESSURE: 110 MMHG

## 2024-03-01 PROBLEM — D57.00 SICKLE CELL DISEASE WITH CRISIS (MULTI): Status: ACTIVE | Noted: 2024-03-01

## 2024-03-01 PROBLEM — D57.09: Status: ACTIVE | Noted: 2024-03-01

## 2024-03-01 LAB
ALBUMIN SERPL BCP-MCNC: 3.2 G/DL (ref 3.4–5)
ALP SERPL-CCNC: 152 U/L (ref 33–120)
ALT SERPL W P-5'-P-CCNC: 22 U/L (ref 10–52)
ANION GAP SERPL CALC-SCNC: 13 MMOL/L (ref 10–20)
AST SERPL W P-5'-P-CCNC: 23 U/L (ref 9–39)
BASOPHILS # BLD AUTO: 0.07 X10*3/UL (ref 0–0.1)
BASOPHILS # BLD AUTO: 0.07 X10*3/UL (ref 0–0.1)
BASOPHILS NFR BLD AUTO: 0.5 %
BASOPHILS NFR BLD AUTO: 0.5 %
BILIRUB SERPL-MCNC: 2.5 MG/DL (ref 0–1.2)
BUN SERPL-MCNC: 5 MG/DL (ref 6–23)
CALCIUM SERPL-MCNC: 8 MG/DL (ref 8.6–10.6)
CHLORIDE SERPL-SCNC: 107 MMOL/L (ref 98–107)
CO2 SERPL-SCNC: 22 MMOL/L (ref 21–32)
CREAT SERPL-MCNC: 0.38 MG/DL (ref 0.5–1.3)
EGFRCR SERPLBLD CKD-EPI 2021: >90 ML/MIN/1.73M*2
EOSINOPHIL # BLD AUTO: 0.38 X10*3/UL (ref 0–0.7)
EOSINOPHIL # BLD AUTO: 0.64 X10*3/UL (ref 0–0.7)
EOSINOPHIL NFR BLD AUTO: 2.5 %
EOSINOPHIL NFR BLD AUTO: 4.6 %
ERYTHROCYTE [DISTWIDTH] IN BLOOD BY AUTOMATED COUNT: 17 % (ref 11.5–14.5)
ERYTHROCYTE [DISTWIDTH] IN BLOOD BY AUTOMATED COUNT: 17.2 % (ref 11.5–14.5)
FLUAV RNA RESP QL NAA+PROBE: NOT DETECTED
FLUBV RNA RESP QL NAA+PROBE: NOT DETECTED
GLUCOSE SERPL-MCNC: 83 MG/DL (ref 74–99)
HAPTOGLOB SERPL-MCNC: 127 MG/DL (ref 37–246)
HCT VFR BLD AUTO: 22.8 % (ref 41–52)
HCT VFR BLD AUTO: 25 % (ref 41–52)
HGB BLD-MCNC: 7.8 G/DL (ref 13.5–17.5)
HGB BLD-MCNC: 8.9 G/DL (ref 13.5–17.5)
HGB RETIC QN: 31 PG (ref 28–38)
IMM GRANULOCYTES # BLD AUTO: 0.08 X10*3/UL (ref 0–0.7)
IMM GRANULOCYTES # BLD AUTO: 0.08 X10*3/UL (ref 0–0.7)
IMM GRANULOCYTES NFR BLD AUTO: 0.5 % (ref 0–0.9)
IMM GRANULOCYTES NFR BLD AUTO: 0.6 % (ref 0–0.9)
IMMATURE RETIC FRACTION: 24.9 %
LDH SERPL L TO P-CCNC: 294 U/L (ref 84–246)
LYMPHOCYTES # BLD AUTO: 3.94 X10*3/UL (ref 1.2–4.8)
LYMPHOCYTES # BLD AUTO: 4.63 X10*3/UL (ref 1.2–4.8)
LYMPHOCYTES NFR BLD AUTO: 25.7 %
LYMPHOCYTES NFR BLD AUTO: 33.5 %
MCH RBC QN AUTO: 28.4 PG (ref 26–34)
MCH RBC QN AUTO: 28.7 PG (ref 26–34)
MCHC RBC AUTO-ENTMCNC: 34.2 G/DL (ref 32–36)
MCHC RBC AUTO-ENTMCNC: 35.6 G/DL (ref 32–36)
MCV RBC AUTO: 81 FL (ref 80–100)
MCV RBC AUTO: 83 FL (ref 80–100)
MONOCYTES # BLD AUTO: 0.99 X10*3/UL (ref 0.1–1)
MONOCYTES # BLD AUTO: 1.04 X10*3/UL (ref 0.1–1)
MONOCYTES NFR BLD AUTO: 6.5 %
MONOCYTES NFR BLD AUTO: 7.5 %
NEUTROPHILS # BLD AUTO: 7.38 X10*3/UL (ref 1.2–7.7)
NEUTROPHILS # BLD AUTO: 9.86 X10*3/UL (ref 1.2–7.7)
NEUTROPHILS NFR BLD AUTO: 53.3 %
NEUTROPHILS NFR BLD AUTO: 64.3 %
NRBC BLD-RTO: 0 /100 WBCS (ref 0–0)
NRBC BLD-RTO: 0.2 /100 WBCS (ref 0–0)
PATH REVIEW-CBC DIFFERENTIAL: NORMAL
PLATELET # BLD AUTO: 1069 X10*3/UL (ref 150–450)
PLATELET # BLD AUTO: 973 X10*3/UL (ref 150–450)
POTASSIUM SERPL-SCNC: 3 MMOL/L (ref 3.5–5.3)
PROT SERPL-MCNC: 5.9 G/DL (ref 6.4–8.2)
RBC # BLD AUTO: 2.75 X10*6/UL (ref 4.5–5.9)
RBC # BLD AUTO: 3.1 X10*6/UL (ref 4.5–5.9)
RETICS #: 0.18 X10*6/UL (ref 0.02–0.12)
RETICS/RBC NFR AUTO: 6.5 % (ref 0.5–2)
SARS-COV-2 RNA RESP QL NAA+PROBE: NOT DETECTED
SODIUM SERPL-SCNC: 139 MMOL/L (ref 136–145)
WBC # BLD AUTO: 13.8 X10*3/UL (ref 4.4–11.3)
WBC # BLD AUTO: 15.3 X10*3/UL (ref 4.4–11.3)

## 2024-03-01 PROCEDURE — 83010 ASSAY OF HAPTOGLOBIN QUANT: CPT

## 2024-03-01 PROCEDURE — 87636 SARSCOV2 & INF A&B AMP PRB: CPT | Performed by: EMERGENCY MEDICINE

## 2024-03-01 PROCEDURE — 85025 COMPLETE CBC W/AUTO DIFF WBC: CPT

## 2024-03-01 PROCEDURE — 96376 TX/PRO/DX INJ SAME DRUG ADON: CPT

## 2024-03-01 PROCEDURE — G0378 HOSPITAL OBSERVATION PER HR: HCPCS

## 2024-03-01 PROCEDURE — 2500000004 HC RX 250 GENERAL PHARMACY W/ HCPCS (ALT 636 FOR OP/ED)

## 2024-03-01 PROCEDURE — 83615 LACTATE (LD) (LDH) ENZYME: CPT

## 2024-03-01 PROCEDURE — 85045 AUTOMATED RETICULOCYTE COUNT: CPT

## 2024-03-01 PROCEDURE — 96361 HYDRATE IV INFUSION ADD-ON: CPT

## 2024-03-01 PROCEDURE — 2500000002 HC RX 250 W HCPCS SELF ADMINISTERED DRUGS (ALT 637 FOR MEDICARE OP, ALT 636 FOR OP/ED): Performed by: PHYSICIAN ASSISTANT

## 2024-03-01 PROCEDURE — 2500000001 HC RX 250 WO HCPCS SELF ADMINISTERED DRUGS (ALT 637 FOR MEDICARE OP): Performed by: PHYSICIAN ASSISTANT

## 2024-03-01 PROCEDURE — 36415 COLL VENOUS BLD VENIPUNCTURE: CPT

## 2024-03-01 PROCEDURE — 99239 HOSP IP/OBS DSCHRG MGMT >30: CPT | Performed by: PHYSICIAN ASSISTANT

## 2024-03-01 PROCEDURE — 2500000001 HC RX 250 WO HCPCS SELF ADMINISTERED DRUGS (ALT 637 FOR MEDICARE OP)

## 2024-03-01 PROCEDURE — 99222 1ST HOSP IP/OBS MODERATE 55: CPT

## 2024-03-01 PROCEDURE — 80053 COMPREHEN METABOLIC PANEL: CPT

## 2024-03-01 RX ORDER — OXYCODONE HYDROCHLORIDE 5 MG/1
15 TABLET ORAL EVERY 4 HOURS PRN
Status: DISCONTINUED | OUTPATIENT
Start: 2024-03-01 | End: 2024-03-01

## 2024-03-01 RX ORDER — ONDANSETRON 4 MG/1
8 TABLET, FILM COATED ORAL EVERY 8 HOURS PRN
Status: DISCONTINUED | OUTPATIENT
Start: 2024-03-01 | End: 2024-03-01 | Stop reason: HOSPADM

## 2024-03-01 RX ORDER — FOLIC ACID 1 MG/1
1 TABLET ORAL DAILY
Status: DISCONTINUED | OUTPATIENT
Start: 2024-03-01 | End: 2024-03-01 | Stop reason: HOSPADM

## 2024-03-01 RX ORDER — POLYETHYLENE GLYCOL 3350 17 G/17G
17 POWDER, FOR SOLUTION ORAL
Status: DISCONTINUED | OUTPATIENT
Start: 2024-03-01 | End: 2024-03-01 | Stop reason: HOSPADM

## 2024-03-01 RX ORDER — ACYCLOVIR 400 MG/1
400 TABLET ORAL 2 TIMES DAILY
Status: DISCONTINUED | OUTPATIENT
Start: 2024-03-01 | End: 2024-03-01 | Stop reason: HOSPADM

## 2024-03-01 RX ORDER — SODIUM CHLORIDE, SODIUM LACTATE, POTASSIUM CHLORIDE, CALCIUM CHLORIDE 600; 310; 30; 20 MG/100ML; MG/100ML; MG/100ML; MG/100ML
100 INJECTION, SOLUTION INTRAVENOUS CONTINUOUS
Status: DISCONTINUED | OUTPATIENT
Start: 2024-03-01 | End: 2024-03-01 | Stop reason: HOSPADM

## 2024-03-01 RX ORDER — PROCHLORPERAZINE MALEATE 10 MG
10 TABLET ORAL EVERY 6 HOURS PRN
Status: DISCONTINUED | OUTPATIENT
Start: 2024-03-01 | End: 2024-03-01 | Stop reason: HOSPADM

## 2024-03-01 RX ORDER — LEVOFLOXACIN 750 MG/1
750 TABLET ORAL DAILY
Qty: 1 TABLET | Refills: 0
Start: 2024-03-01 | End: 2024-03-02

## 2024-03-01 RX ORDER — ENOXAPARIN SODIUM 100 MG/ML
40 INJECTION SUBCUTANEOUS DAILY
Status: DISCONTINUED | OUTPATIENT
Start: 2024-03-01 | End: 2024-03-01 | Stop reason: HOSPADM

## 2024-03-01 RX ORDER — NALOXONE HYDROCHLORIDE 0.4 MG/ML
0.2 INJECTION, SOLUTION INTRAMUSCULAR; INTRAVENOUS; SUBCUTANEOUS AS NEEDED
Status: DISCONTINUED | OUTPATIENT
Start: 2024-03-01 | End: 2024-03-01 | Stop reason: HOSPADM

## 2024-03-01 RX ORDER — OXYCODONE HYDROCHLORIDE 5 MG/1
15 TABLET ORAL
Status: DISCONTINUED | OUTPATIENT
Start: 2024-03-01 | End: 2024-03-01 | Stop reason: HOSPADM

## 2024-03-01 RX ORDER — DIPHENHYDRAMINE HCL 25 MG
25 CAPSULE ORAL EVERY 6 HOURS PRN
Status: DISCONTINUED | OUTPATIENT
Start: 2024-03-01 | End: 2024-03-01 | Stop reason: HOSPADM

## 2024-03-01 RX ORDER — AMOXICILLIN 250 MG
2 CAPSULE ORAL EVERY 12 HOURS
Status: DISCONTINUED | OUTPATIENT
Start: 2024-03-01 | End: 2024-03-01 | Stop reason: HOSPADM

## 2024-03-01 RX ORDER — POTASSIUM CHLORIDE 20 MEQ/1
20 TABLET, EXTENDED RELEASE ORAL ONCE
Status: COMPLETED | OUTPATIENT
Start: 2024-03-01 | End: 2024-03-01

## 2024-03-01 RX ORDER — HYDROMORPHONE HYDROCHLORIDE 1 MG/ML
1 INJECTION, SOLUTION INTRAMUSCULAR; INTRAVENOUS; SUBCUTANEOUS ONCE
Status: COMPLETED | OUTPATIENT
Start: 2024-03-01 | End: 2024-03-01

## 2024-03-01 RX ORDER — OLANZAPINE 5 MG/1
5 TABLET ORAL NIGHTLY
Status: DISCONTINUED | OUTPATIENT
Start: 2024-03-01 | End: 2024-03-01 | Stop reason: HOSPADM

## 2024-03-01 RX ADMIN — FOLIC ACID 1 MG: 1 TABLET ORAL at 08:28

## 2024-03-01 RX ADMIN — HYDROMORPHONE HYDROCHLORIDE 2 MG: 2 INJECTION, SOLUTION INTRAMUSCULAR; INTRAVENOUS; SUBCUTANEOUS at 10:18

## 2024-03-01 RX ADMIN — ACYCLOVIR 400 MG: 400 TABLET ORAL at 08:57

## 2024-03-01 RX ADMIN — OXYCODONE HYDROCHLORIDE 15 MG: 5 TABLET ORAL at 08:28

## 2024-03-01 RX ADMIN — HYDROMORPHONE HYDROCHLORIDE 1 MG: 1 INJECTION, SOLUTION INTRAMUSCULAR; INTRAVENOUS; SUBCUTANEOUS at 02:25

## 2024-03-01 RX ADMIN — HYDROMORPHONE HYDROCHLORIDE 2 MG: 2 INJECTION, SOLUTION INTRAMUSCULAR; INTRAVENOUS; SUBCUTANEOUS at 06:05

## 2024-03-01 RX ADMIN — OXYCODONE HYDROCHLORIDE 15 MG: 5 TABLET ORAL at 03:51

## 2024-03-01 RX ADMIN — POTASSIUM CHLORIDE 20 MEQ: 1500 TABLET, EXTENDED RELEASE ORAL at 08:57

## 2024-03-01 RX ADMIN — OXYCODONE HYDROCHLORIDE 15 MG: 5 TABLET ORAL at 12:25

## 2024-03-01 RX ADMIN — HYDROMORPHONE HYDROCHLORIDE 1 MG: 1 INJECTION, SOLUTION INTRAMUSCULAR; INTRAVENOUS; SUBCUTANEOUS at 00:27

## 2024-03-01 RX ADMIN — SODIUM CHLORIDE, POTASSIUM CHLORIDE, SODIUM LACTATE AND CALCIUM CHLORIDE 100 ML/HR: 600; 310; 30; 20 INJECTION, SOLUTION INTRAVENOUS at 09:40

## 2024-03-01 RX ADMIN — SODIUM CHLORIDE, POTASSIUM CHLORIDE, SODIUM LACTATE AND CALCIUM CHLORIDE 100 ML/HR: 600; 310; 30; 20 INJECTION, SOLUTION INTRAVENOUS at 03:51

## 2024-03-01 ASSESSMENT — PAIN - FUNCTIONAL ASSESSMENT
PAIN_FUNCTIONAL_ASSESSMENT: 0-10

## 2024-03-01 ASSESSMENT — ENCOUNTER SYMPTOMS
SHORTNESS OF BREATH: 0
CONSTIPATION: 0
DYSURIA: 0
DIARRHEA: 1
ABDOMINAL PAIN: 1
COUGH: 0
NAUSEA: 1
APPETITE CHANGE: 1
DIFFICULTY URINATING: 0
ARTHRALGIAS: 0
FEVER: 0
VOMITING: 1

## 2024-03-01 ASSESSMENT — PAIN DESCRIPTION - LOCATION
LOCATION: ABDOMEN

## 2024-03-01 ASSESSMENT — LIFESTYLE VARIABLES
HAVE YOU EVER FELT YOU SHOULD CUT DOWN ON YOUR DRINKING: NO
HAVE PEOPLE ANNOYED YOU BY CRITICIZING YOUR DRINKING: NO
EVER FELT BAD OR GUILTY ABOUT YOUR DRINKING: NO

## 2024-03-01 ASSESSMENT — PAIN SCALES - GENERAL
PAINLEVEL_OUTOF10: 5 - MODERATE PAIN
PAINLEVEL_OUTOF10: 9
PAINLEVEL_OUTOF10: 5 - MODERATE PAIN
PAINLEVEL_OUTOF10: 9
PAINLEVEL_OUTOF10: 9
PAINLEVEL_OUTOF10: 10 - WORST POSSIBLE PAIN
PAINLEVEL_OUTOF10: 9
PAINLEVEL_OUTOF10: 9

## 2024-03-01 NOTE — ED TRIAGE NOTES
Pt brought in by EMS, with sickle cell crisis, home medication of oxy not working anymore.  Pain 10/10, pt resting in bed, visible from nurses station, will continue to monitor
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
## 2024-03-01 NOTE — PROGRESS NOTES
Emergency Medicine Transition of Care Note.    I received Joellen Narayan in signout from previous provider. Please see the previous ED provider note for all HPI, PE and MDM up to the time of sign-out. This is in addition to the primary record.    ED Course as of 03/01/24 0128   u Feb 29, 2024   [id number] ED Senior resident attestation:  23-year-old male with history of sickle cell disease, recent hospitalization for acute Salas syndrome who presents with chest pain.  Patient reports discharge yesterday following treatment for acute chest syndrome, felt well all day yesterday, today however he reports increasing pain refractory to home oxycodone.  Denies fevers, chills, cough.  On exam, patient's vitals are normal, he is in no acute distress nontoxic-appearing though does appear uncomfortable, lungs are clear, abdomen soft nontender.  We did proceed with basic labs, hemolysis studies, as well as plain films.  Chest x-ray shows no consolidation or effusion.  Patient's pain was also treated.  He was signed out stable condition of pending reassessment following completion of 3 rounds of analgesics as well as a repeat CBC. [AUSTIN]      ED Course User Index  [AUSTIN] Deacon LUPE Junior, DO         Diagnoses as of 03/01/24 0128   Sickle cell disease with crisis (CMS/HCC)     Medical Decision Making    Final diagnoses:   [D57.00] Sickle cell disease with crisis (CMS/HCC)     At the time of sign out, vital signs were as follows:  Vitals:    03/01/24 0031   BP: 115/60   Pulse: 79   Resp: 16   Temp: 37 °C (98.6 °F)   SpO2: 100%     In brief Joellen Narayan is an 23 y.o. male presenting for sickle cell pain crises    Patient was signed out to me pending treatment for sickle cell pain.  After his third dose of 1 mg Dilaudid patient states still being in pain.  Patient was admitted to the hematology service for further pain control.        Dispo: Admitted    Marta Ordaz MD  Emergency Medicine, PGY-1

## 2024-03-01 NOTE — DISCHARGE SUMMARY
Discharge Diagnosis  Sickle cell disease with crisis (CMS/HCC)    Issues Requiring Follow-Up  None    Test Results Pending At Discharge  Pending Labs       Order Current Status    Pathologist Review-CBC Differential In process        Hospital Course  Joellen Narayan is a 23 y.o. 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  Low concern for acute chest syndrome given patient had unremarkable chest imaging, benign lung exam, and patient is on room air and hemodynamically stable.  He has no residual signs or symptoms of infection.  He was admitted for further pain management. Of note patient has missed his infusion apt on 2/29 for Rituxan. Seen in the ED today and is doing well and is discharged in stable condition. He has a FUV with Dr. Stoll on 3/7 and  sickle cell team on 3/20.   On the day of discharge, the patient reported feeling well and pain was controlled. Vitals and labs were stable.   Attending has reviewed all labs and vitals, and discussed and agreed with the discharge plan prior to patient discharge.   Patient discharged in stable condition. > 30 minutes spent on discharge planning.  Pertinent Physical Exam At Time of Discharge  Physical Exam  General: alert, awake, and well-developed  Skin: warm, dry, intact  Head/Neck: NCAT, supple  Eyes: EOMI, no scleral icterus  Mouth: OMM, no erythema or discharge   Lungs: CTA, no adventitious breath sounds  Cardiovascular: RRR,no m/r/g, no edema  Abdomen: +BS, soft, non-tender, non-distended  Neuro: normal movement, normal speech   Psych: normal affect and mood    Home Medications     Medication List      CONTINUE taking these medications     acyclovir  400 mg tablet; Commonly known as: Zovirax; Take 1 tablet (400   mg) by mouth 2 times a day.   Endari 5 gram powder in packet; Generic drug: glutamine (sickle cell);   Take 15 g by mouth 2 times a day.   folic acid 1 mg tablet; Commonly known as: Folvite; Take 1 tablet (1 mg)   by mouth once daily.   levoFLOXacin 750 mg tablet; Commonly known as: Levaquin; Take 1 tablet   (750 mg) by mouth once daily for 1 day.   OLANZapine 5 mg tablet; Commonly known as: ZyPREXA; Take 1 tablet (5 mg)   by mouth once daily at bedtime For 4 days starting the evening of   treatment.   ondansetron 8 mg tablet; Commonly known as: Zofran; Take 1 tablet (8 mg)   by mouth every 8 hours if needed for nausea or vomiting.   oxyCODONE 15 mg immediate release tablet; Commonly known as: Roxicodone;   Take 1 tablet (15 mg) by mouth every 6 hours if needed (Severe sickle cell   pain).   polyethylene glycol 17 gram packet; Commonly known as: Glycolax, Miralax   predniSONE 50 mg tablet; Commonly known as: Deltasone; Take 2 tablets   (100 mg) by mouth on Days 1, 2, 3, 4, and 5 of treatment cycle.   prochlorperazine 10 mg tablet; Commonly known as: Compazine; Take 1   tablet (10 mg) by mouth every 6 hours if needed for nausea or vomiting.   sennosides-docusate sodium 8.6-50 mg tablet; Commonly known as:   Promise-Colace       Outpatient Follow-Up  Future Appointments   Date Time Provider Department [place]   3/7/2024  4:40 PM Melissa Stoll MD [id number] Academic   3/20/2024 11:00 AM Renee Barrera APRN-CNP [id number] Academic   3/25/2024  1:40 PM Sade Loza PA-C [id number] Academic   3/29/2024  1:00 PM Chloe Sheth, APRSANDIP-CNP [id number] Academic       Sachin Reynolds PA-C
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
## 2024-03-01 NOTE — PROGRESS NOTES
Patient Enrollment form was submitted to [facility] to explore potential financial assistance for Oxbryta. Pt's insurance has denied patient coverage of medication. LISW will continue to follow application process and keep pt updated.     LISW will continue to follow as needed.

## 2024-03-04 LAB
ATRIAL RATE: 93 BPM
P AXIS: 261 DEGREES
P OFFSET: 198 MS
P ONSET: 136 MS
PR INTERVAL: 164 MS
Q ONSET: 218 MS
QRS COUNT: 15 BEATS
QRS DURATION: 98 MS
QT INTERVAL: 340 MS
QTC CALCULATION(BAZETT): 422 MS
QTC FREDERICIA: 393 MS
R AXIS: 56 DEGREES
T AXIS: 2 DEGREES
T OFFSET: 388 MS
VENTRICULAR RATE: 93 BPM

## 2024-03-07 ENCOUNTER — OFFICE VISIT (OUTPATIENT)
Dept: HEMATOLOGY/ONCOLOGY | Facility: HOSPITAL | Age: 24
End: 2024-03-07
Payer: COMMERCIAL

## 2024-03-07 VITALS
WEIGHT: 147.05 LBS | HEART RATE: 98 BPM | RESPIRATION RATE: 20 BRPM | HEIGHT: 73 IN | SYSTOLIC BLOOD PRESSURE: 130 MMHG | TEMPERATURE: 97.7 F | OXYGEN SATURATION: 100 % | BODY MASS INDEX: 19.49 KG/M2 | DIASTOLIC BLOOD PRESSURE: 63 MMHG

## 2024-03-07 DIAGNOSIS — D57.00 SICKLE CELL PAIN CRISIS (MULTI): ICD-10-CM

## 2024-03-07 DIAGNOSIS — C81.03 NODULAR LYMPHOCYTE PREDOMINANT HODGKIN LYMPHOMA OF INTRA-ABDOMINAL LYMPH NODES (MULTI): ICD-10-CM

## 2024-03-07 PROCEDURE — 3075F SYST BP GE 130 - 139MM HG: CPT | Performed by: INTERNAL MEDICINE

## 2024-03-07 PROCEDURE — 99215 OFFICE O/P EST HI 40 MIN: CPT | Performed by: INTERNAL MEDICINE

## 2024-03-07 PROCEDURE — 1036F TOBACCO NON-USER: CPT | Performed by: INTERNAL MEDICINE

## 2024-03-07 PROCEDURE — 3078F DIAST BP <80 MM HG: CPT | Performed by: INTERNAL MEDICINE

## 2024-03-07 ASSESSMENT — PAIN SCALES - GENERAL: PAINLEVEL: 0-NO PAIN

## 2024-03-09 NOTE — PROGRESS NOTES
prednisonePatient ID: Joellen Narayan is a 23 y.o. male.  Referring Physician: No referring provider defined for this encounter.  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 He came with his monther.  12/16/23: He was found to have retroperitoneal LN's and recent PET showed slight increase RPLN's in size, Interval development on multiple FDG avid focus within sternum, vertebral body, R acetabular wall and L femoral head. MRI C/T/L spine (12/20) slight decreased marrow signal throughout the spine consistent with chronic anemia in sickle cell disease. MRI Pelvis (12/20) T2 and FLAIR hyperintense signal of the R superior endplate of the L5 vertebral body, R superior acetabulum, and L femoral head corresponding to hypermetabolic lesions seen on PET that may represent osseous lymphomatous involvement. He underwent Bx per IR on 11/30/23 and path was c/w NLPHL(grade II) He denies recent night sweats, weight loss, n/v/d or abd pain. He also denies bowel/urinary problems.   1/18/24: here for chemotherapy. Since last visit, he was seen 2 x at ED with sickle cell crisis. Doing ok since.   2/8/24: here for cycle #2. Feels well today. Labs reviewed  3/7/24: missed C#3 last week due to sickle cell crisis. Feels ok today.    Social History  He reports that he has never smoked. He has been exposed to tobacco smoke. He has never used smokeless tobacco. He reports that he does not drink alcohol and does not use drugs. He has 3 siblings with sickle cell trait    FINAL DIAGNOSIS 11/30/23  A. LYMPH NODE, PARAAORTIC, CORE BIOPSY:      -- NODULAR LYMPHOCYTE PREDOMINANT HODGKIN LYMPHOMA (WHO 2022 CLASSIFICATION)/ NODULAR LYMPHOCYTE PREDOMINANT B CELL  "LYMPHOMA (ICC 2022  CLASSIFICATION) (NLPHL)            Review of Systems   All other systems reviewed and are negative.       Objective   BSA: 1.85 meters squared  /63   Pulse 98   Temp 36.5 °C (97.7 °F) (Core)   Resp 20   Ht (S) 1.853 m (6' 0.95\")   Wt 66.7 kg (147 lb 0.8 oz)   SpO2 100%   BMI 19.43 kg/m²     Family History   Problem Relation Name Age of Onset    Sickle cell trait Mother      Sickle cell trait Father      Lung cancer Brother       Oncology History   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-abdominal lymph nodes (CMS/HCC)   12/19/2023 Initial Diagnosis    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-abdominal lymph nodes (CMS/HCC)     1/18/2024 -  Chemotherapy    R-CHOP (Cyclophosphamide / DOXOrubicin / VinCRIStine / PredniSONE) + RiTUXimab, 21 Day Cycles         Joellen Narayan  reports that he has never smoked. He has been exposed to tobacco smoke. He has never used smokeless tobacco.  He  reports no history of alcohol use.  He  reports no history of drug use.    Physical Exam  HENT:      Head: Normocephalic.   Eyes:      Pupils: Pupils are equal, round, and reactive to light.   Cardiovascular:      Rate and Rhythm: Normal rate.      Pulses: Normal pulses.      Comments: 2/6 AMADEO  Pulmonary:      Effort: Pulmonary effort is normal.      Breath sounds: Normal breath sounds.   Abdominal:      General: Abdomen is flat.      Palpations: Abdomen is soft.   Musculoskeletal:         General: Normal range of motion.      Cervical back: Normal range of motion and neck supple.   Neurological:      General: No focal deficit present.      Mental Status: He is alert.       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 He came with his monther.    NLPHL  - echocardiogram reviewed with patient  - elevated LDH/bili partially from sickle cell disease  - chemotherapy was discussed(R-CHOP).-> we decided to simplify his chemotherapy to Rituxan and prdnisone q 3 weeks mainly due to frequent sickle cell crisis  -tolerating chemo well.  - proceed with cycle #3 next week.  - PET after C4.    Sickle cell anemia    Hypoxia   On home O2 but not compliant  - encouraged him to use-> if not he will have more frequent SS crisis     RTC 1 wk for cycle #3      Melissa Stoll MD
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
## 2024-03-13 ENCOUNTER — APPOINTMENT (OUTPATIENT)
Dept: HEMATOLOGY/ONCOLOGY | Facility: HOSPITAL | Age: 24
End: 2024-03-13
Payer: COMMERCIAL

## 2024-03-13 ENCOUNTER — INFUSION (OUTPATIENT)
Dept: HEMATOLOGY/ONCOLOGY | Facility: HOSPITAL | Age: 24
End: 2024-03-13
Payer: COMMERCIAL

## 2024-03-13 VITALS
TEMPERATURE: 99.9 F | SYSTOLIC BLOOD PRESSURE: 128 MMHG | WEIGHT: 153.88 LBS | RESPIRATION RATE: 18 BRPM | BODY MASS INDEX: 20.33 KG/M2 | DIASTOLIC BLOOD PRESSURE: 62 MMHG | HEART RATE: 87 BPM | OXYGEN SATURATION: 99 %

## 2024-03-13 DIAGNOSIS — C81.03 NODULAR LYMPHOCYTE PREDOMINANT HODGKIN LYMPHOMA OF INTRA-ABDOMINAL LYMPH NODES (MULTI): Primary | ICD-10-CM

## 2024-03-13 DIAGNOSIS — C81.03 NODULAR LYMPHOCYTE PREDOMINANT HODGKIN LYMPHOMA OF INTRA-ABDOMINAL LYMPH NODES (MULTI): ICD-10-CM

## 2024-03-13 LAB
ALBUMIN SERPL BCP-MCNC: 3.9 G/DL (ref 3.4–5)
ALP SERPL-CCNC: 127 U/L (ref 33–120)
ALT SERPL W P-5'-P-CCNC: 16 U/L (ref 10–52)
ANION GAP SERPL CALC-SCNC: 11 MMOL/L (ref 10–20)
AST SERPL W P-5'-P-CCNC: 20 U/L (ref 9–39)
BASOPHILS # BLD AUTO: 0.09 X10*3/UL (ref 0–0.1)
BASOPHILS NFR BLD AUTO: 0.7 %
BILIRUB SERPL-MCNC: 2.8 MG/DL (ref 0–1.2)
BUN SERPL-MCNC: 6 MG/DL (ref 6–23)
CALCIUM SERPL-MCNC: 8.9 MG/DL (ref 8.6–10.3)
CHLORIDE SERPL-SCNC: 109 MMOL/L (ref 98–107)
CO2 SERPL-SCNC: 25 MMOL/L (ref 21–32)
CREAT SERPL-MCNC: 0.5 MG/DL (ref 0.5–1.3)
EGFRCR SERPLBLD CKD-EPI 2021: >90 ML/MIN/1.73M*2
EOSINOPHIL # BLD AUTO: 0.24 X10*3/UL (ref 0–0.7)
EOSINOPHIL NFR BLD AUTO: 1.9 %
ERYTHROCYTE [DISTWIDTH] IN BLOOD BY AUTOMATED COUNT: 18.6 % (ref 11.5–14.5)
GLUCOSE SERPL-MCNC: 80 MG/DL (ref 74–99)
HCT VFR BLD AUTO: 29.6 % (ref 41–52)
HGB BLD-MCNC: 10.1 G/DL (ref 13.5–17.5)
IMM GRANULOCYTES # BLD AUTO: 0.07 X10*3/UL (ref 0–0.7)
IMM GRANULOCYTES NFR BLD AUTO: 0.5 % (ref 0–0.9)
LDH SERPL L TO P-CCNC: 167 U/L (ref 84–246)
LYMPHOCYTES # BLD AUTO: 3.88 X10*3/UL (ref 1.2–4.8)
LYMPHOCYTES NFR BLD AUTO: 30.4 %
MCH RBC QN AUTO: 29 PG (ref 26–34)
MCHC RBC AUTO-ENTMCNC: 34.1 G/DL (ref 32–36)
MCV RBC AUTO: 85 FL (ref 80–100)
MONOCYTES # BLD AUTO: 1.54 X10*3/UL (ref 0.1–1)
MONOCYTES NFR BLD AUTO: 12.1 %
NEUTROPHILS # BLD AUTO: 6.96 X10*3/UL (ref 1.2–7.7)
NEUTROPHILS NFR BLD AUTO: 54.4 %
NRBC BLD-RTO: 0.6 /100 WBCS (ref 0–0)
PLATELET # BLD AUTO: 539 X10*3/UL (ref 150–450)
POTASSIUM SERPL-SCNC: 3.3 MMOL/L (ref 3.5–5.3)
PROT SERPL-MCNC: 6.5 G/DL (ref 6.4–8.2)
RBC # BLD AUTO: 3.48 X10*6/UL (ref 4.5–5.9)
SODIUM SERPL-SCNC: 142 MMOL/L (ref 136–145)
WBC # BLD AUTO: 12.8 X10*3/UL (ref 4.4–11.3)

## 2024-03-13 PROCEDURE — 85025 COMPLETE CBC W/AUTO DIFF WBC: CPT

## 2024-03-13 PROCEDURE — 80053 COMPREHEN METABOLIC PANEL: CPT

## 2024-03-13 PROCEDURE — 96375 TX/PRO/DX INJ NEW DRUG ADDON: CPT | Mod: INF

## 2024-03-13 PROCEDURE — 2500000001 HC RX 250 WO HCPCS SELF ADMINISTERED DRUGS (ALT 637 FOR MEDICARE OP): Performed by: INTERNAL MEDICINE

## 2024-03-13 PROCEDURE — 96413 CHEMO IV INFUSION 1 HR: CPT

## 2024-03-13 PROCEDURE — 2500000004 HC RX 250 GENERAL PHARMACY W/ HCPCS (ALT 636 FOR OP/ED): Performed by: INTERNAL MEDICINE

## 2024-03-13 PROCEDURE — 83615 LACTATE (LD) (LDH) ENZYME: CPT

## 2024-03-13 PROCEDURE — 96415 CHEMO IV INFUSION ADDL HR: CPT

## 2024-03-13 RX ORDER — ACETAMINOPHEN 325 MG/1
650 TABLET ORAL ONCE
Status: CANCELLED | OUTPATIENT
Start: 2024-04-04

## 2024-03-13 RX ORDER — ACETAMINOPHEN 325 MG/1
650 TABLET ORAL ONCE
Status: COMPLETED | OUTPATIENT
Start: 2024-03-13 | End: 2024-03-13

## 2024-03-13 RX ORDER — FAMOTIDINE 10 MG/ML
20 INJECTION INTRAVENOUS ONCE AS NEEDED
Status: DISCONTINUED | OUTPATIENT
Start: 2024-03-13 | End: 2024-03-13 | Stop reason: HOSPADM

## 2024-03-13 RX ORDER — FAMOTIDINE 10 MG/ML
20 INJECTION INTRAVENOUS ONCE AS NEEDED
Status: CANCELLED | OUTPATIENT
Start: 2024-04-04

## 2024-03-13 RX ORDER — DIPHENHYDRAMINE HYDROCHLORIDE 50 MG/ML
50 INJECTION INTRAMUSCULAR; INTRAVENOUS AS NEEDED
Status: CANCELLED | OUTPATIENT
Start: 2024-04-04

## 2024-03-13 RX ORDER — DIPHENHYDRAMINE HYDROCHLORIDE 50 MG/ML
50 INJECTION INTRAMUSCULAR; INTRAVENOUS AS NEEDED
Status: DISCONTINUED | OUTPATIENT
Start: 2024-03-13 | End: 2024-03-13 | Stop reason: HOSPADM

## 2024-03-13 RX ORDER — ALBUTEROL SULFATE 0.83 MG/ML
3 SOLUTION RESPIRATORY (INHALATION) AS NEEDED
Status: DISCONTINUED | OUTPATIENT
Start: 2024-03-13 | End: 2024-03-13 | Stop reason: HOSPADM

## 2024-03-13 RX ORDER — EPINEPHRINE 0.3 MG/.3ML
0.3 INJECTION SUBCUTANEOUS EVERY 5 MIN PRN
Status: DISCONTINUED | OUTPATIENT
Start: 2024-03-13 | End: 2024-03-13 | Stop reason: HOSPADM

## 2024-03-13 RX ORDER — EPINEPHRINE 0.3 MG/.3ML
0.3 INJECTION SUBCUTANEOUS EVERY 5 MIN PRN
Status: CANCELLED | OUTPATIENT
Start: 2024-04-04

## 2024-03-13 RX ORDER — DIPHENHYDRAMINE HCL 50 MG
50 CAPSULE ORAL ONCE
Status: COMPLETED | OUTPATIENT
Start: 2024-03-13 | End: 2024-03-13

## 2024-03-13 RX ORDER — DIPHENHYDRAMINE HCL 50 MG
50 CAPSULE ORAL ONCE
Status: CANCELLED | OUTPATIENT
Start: 2024-04-04

## 2024-03-13 RX ORDER — ALBUTEROL SULFATE 0.83 MG/ML
3 SOLUTION RESPIRATORY (INHALATION) AS NEEDED
Status: CANCELLED | OUTPATIENT
Start: 2024-04-04

## 2024-03-13 RX ADMIN — SODIUM CHLORIDE 700 MG: 9 INJECTION, SOLUTION INTRAVENOUS at 10:15

## 2024-03-13 RX ADMIN — DIPHENHYDRAMINE HYDROCHLORIDE 50 MG: 50 CAPSULE ORAL at 10:00

## 2024-03-13 RX ADMIN — METHYLPREDNISOLONE SODIUM SUCCINATE 125 MG: 125 INJECTION, POWDER, FOR SOLUTION INTRAMUSCULAR; INTRAVENOUS at 09:48

## 2024-03-13 RX ADMIN — ACETAMINOPHEN 650 MG: 325 TABLET ORAL at 10:00

## 2024-03-13 ASSESSMENT — PAIN SCALES - GENERAL: PAINLEVEL: 0-NO PAIN

## 2024-03-13 NOTE — LETTER
Date: 2024  RE:  Joellen Narayan  :  2000      To Whom It May Concern:    Our patient, Joellen, has been under our care and now may return back to work without restrictions.    If you have questions concerning this patient's immediate care, please feel free to contact our office at [phone].    Sincerely,      Brooklyn Fong RN with [facility] and Dr. Chaekals team.

## 2024-03-13 NOTE — PROGRESS NOTES
Patient in clinic for dose #3 of rituximab. Tolerated without issue. VS remained stable throughout the infusion. Patient denied problem or concern.

## 2024-03-18 DIAGNOSIS — C81.03 NODULAR LYMPHOCYTE PREDOMINANT HODGKIN LYMPHOMA OF INTRA-ABDOMINAL LYMPH NODES (MULTI): ICD-10-CM

## 2024-03-19 ENCOUNTER — APPOINTMENT (OUTPATIENT)
Dept: GASTROENTEROLOGY | Facility: HOSPITAL | Age: 24
End: 2024-03-19
Payer: COMMERCIAL

## 2024-03-19 DIAGNOSIS — D57.00 SICKLE CELL DISEASE WITH CRISIS (MULTI): ICD-10-CM

## 2024-03-19 RX ORDER — GLUTAMINE 5 G/1
15 POWDER, FOR SOLUTION ORAL 2 TIMES DAILY
Qty: 180 EACH | Refills: 3 | Status: SHIPPED | OUTPATIENT
Start: 2024-03-19

## 2024-03-21 ENCOUNTER — APPOINTMENT (OUTPATIENT)
Dept: HEMATOLOGY/ONCOLOGY | Facility: HOSPITAL | Age: 24
End: 2024-03-21
Payer: COMMERCIAL

## 2024-03-29 ENCOUNTER — APPOINTMENT (OUTPATIENT)
Dept: PULMONOLOGY | Facility: HOSPITAL | Age: 24
End: 2024-03-29
Payer: COMMERCIAL

## 2024-03-29 DIAGNOSIS — C81.03 NODULAR LYMPHOCYTE PREDOMINANT HODGKIN LYMPHOMA OF INTRA-ABDOMINAL LYMPH NODES (MULTI): Primary | ICD-10-CM

## 2024-04-03 RX ORDER — DIPHENHYDRAMINE HYDROCHLORIDE 50 MG/ML
50 INJECTION INTRAMUSCULAR; INTRAVENOUS AS NEEDED
Status: CANCELLED | OUTPATIENT
Start: 2024-05-16

## 2024-04-03 RX ORDER — ALBUTEROL SULFATE 0.83 MG/ML
3 SOLUTION RESPIRATORY (INHALATION) AS NEEDED
Status: CANCELLED | OUTPATIENT
Start: 2024-05-16

## 2024-04-03 RX ORDER — DIPHENHYDRAMINE HCL 50 MG
50 CAPSULE ORAL ONCE
Status: CANCELLED | OUTPATIENT
Start: 2024-05-16

## 2024-04-03 RX ORDER — EPINEPHRINE 0.3 MG/.3ML
0.3 INJECTION SUBCUTANEOUS EVERY 5 MIN PRN
Status: CANCELLED | OUTPATIENT
Start: 2024-05-16

## 2024-04-03 RX ORDER — ACETAMINOPHEN 325 MG/1
650 TABLET ORAL ONCE
Status: CANCELLED | OUTPATIENT
Start: 2024-05-16

## 2024-04-03 RX ORDER — FAMOTIDINE 10 MG/ML
20 INJECTION INTRAVENOUS ONCE AS NEEDED
Status: CANCELLED | OUTPATIENT
Start: 2024-05-16

## 2024-04-04 ENCOUNTER — LAB (OUTPATIENT)
Dept: LAB | Facility: HOSPITAL | Age: 24
End: 2024-04-04
Payer: COMMERCIAL

## 2024-04-04 ENCOUNTER — INFUSION (OUTPATIENT)
Dept: HEMATOLOGY/ONCOLOGY | Facility: HOSPITAL | Age: 24
End: 2024-04-04
Payer: COMMERCIAL

## 2024-04-04 ENCOUNTER — OFFICE VISIT (OUTPATIENT)
Dept: HEMATOLOGY/ONCOLOGY | Facility: HOSPITAL | Age: 24
End: 2024-04-04
Payer: COMMERCIAL

## 2024-04-04 VITALS
WEIGHT: 155.5 LBS | HEIGHT: 73 IN | DIASTOLIC BLOOD PRESSURE: 69 MMHG | SYSTOLIC BLOOD PRESSURE: 127 MMHG | RESPIRATION RATE: 18 BRPM | OXYGEN SATURATION: 97 % | BODY MASS INDEX: 20.61 KG/M2 | HEART RATE: 93 BPM | TEMPERATURE: 98.6 F

## 2024-04-04 DIAGNOSIS — C81.03 NODULAR LYMPHOCYTE PREDOMINANT HODGKIN LYMPHOMA OF INTRA-ABDOMINAL LYMPH NODES (MULTI): ICD-10-CM

## 2024-04-04 LAB
ALBUMIN SERPL BCP-MCNC: 4.7 G/DL (ref 3.4–5)
ALP SERPL-CCNC: 102 U/L (ref 33–120)
ALT SERPL W P-5'-P-CCNC: 15 U/L (ref 10–52)
ANION GAP SERPL CALC-SCNC: 15 MMOL/L (ref 10–20)
AST SERPL W P-5'-P-CCNC: 33 U/L (ref 9–39)
BASOPHILS # BLD AUTO: 0.04 X10*3/UL (ref 0–0.1)
BASOPHILS NFR BLD AUTO: 0.4 %
BILIRUB SERPL-MCNC: 6.2 MG/DL (ref 0–1.2)
BUN SERPL-MCNC: 3 MG/DL (ref 6–23)
CALCIUM SERPL-MCNC: 9.2 MG/DL (ref 8.6–10.3)
CHLORIDE SERPL-SCNC: 105 MMOL/L (ref 98–107)
CO2 SERPL-SCNC: 21 MMOL/L (ref 21–32)
CREAT SERPL-MCNC: 0.65 MG/DL (ref 0.5–1.3)
EGFRCR SERPLBLD CKD-EPI 2021: >90 ML/MIN/1.73M*2
EOSINOPHIL # BLD AUTO: 0.1 X10*3/UL (ref 0–0.7)
EOSINOPHIL NFR BLD AUTO: 1.1 %
ERYTHROCYTE [DISTWIDTH] IN BLOOD BY AUTOMATED COUNT: 19.1 % (ref 11.5–14.5)
GLUCOSE SERPL-MCNC: 82 MG/DL (ref 74–99)
HCT VFR BLD AUTO: 26.9 % (ref 41–52)
HGB BLD-MCNC: 9.8 G/DL (ref 13.5–17.5)
IMM GRANULOCYTES # BLD AUTO: 0.04 X10*3/UL (ref 0–0.7)
IMM GRANULOCYTES NFR BLD AUTO: 0.4 % (ref 0–0.9)
LDH SERPL L TO P-CCNC: 325 U/L (ref 84–246)
LYMPHOCYTES # BLD AUTO: 2.9 X10*3/UL (ref 1.2–4.8)
LYMPHOCYTES NFR BLD AUTO: 30.6 %
MCH RBC QN AUTO: 29.6 PG (ref 26–34)
MCHC RBC AUTO-ENTMCNC: 36.4 G/DL (ref 32–36)
MCV RBC AUTO: 81 FL (ref 80–100)
MONOCYTES # BLD AUTO: 0.87 X10*3/UL (ref 0.1–1)
MONOCYTES NFR BLD AUTO: 9.2 %
NEUTROPHILS # BLD AUTO: 5.53 X10*3/UL (ref 1.2–7.7)
NEUTROPHILS NFR BLD AUTO: 58.3 %
NRBC BLD-RTO: 1.4 /100 WBCS (ref 0–0)
PLATELET # BLD AUTO: 449 X10*3/UL (ref 150–450)
POTASSIUM SERPL-SCNC: 3.3 MMOL/L (ref 3.5–5.3)
PROT SERPL-MCNC: 7.4 G/DL (ref 6.4–8.2)
RBC # BLD AUTO: 3.31 X10*6/UL (ref 4.5–5.9)
SODIUM SERPL-SCNC: 138 MMOL/L (ref 136–145)
WBC # BLD AUTO: 9.5 X10*3/UL (ref 4.4–11.3)

## 2024-04-04 PROCEDURE — 99215 OFFICE O/P EST HI 40 MIN: CPT | Performed by: INTERNAL MEDICINE

## 2024-04-04 PROCEDURE — 1036F TOBACCO NON-USER: CPT | Performed by: INTERNAL MEDICINE

## 2024-04-04 PROCEDURE — 3078F DIAST BP <80 MM HG: CPT | Performed by: INTERNAL MEDICINE

## 2024-04-04 PROCEDURE — 85025 COMPLETE CBC W/AUTO DIFF WBC: CPT

## 2024-04-04 PROCEDURE — 3074F SYST BP LT 130 MM HG: CPT | Performed by: INTERNAL MEDICINE

## 2024-04-04 PROCEDURE — 36415 COLL VENOUS BLD VENIPUNCTURE: CPT

## 2024-04-04 PROCEDURE — 83615 LACTATE (LD) (LDH) ENZYME: CPT

## 2024-04-04 PROCEDURE — RXMED WILLOW AMBULATORY MEDICATION CHARGE

## 2024-04-04 PROCEDURE — 84075 ASSAY ALKALINE PHOSPHATASE: CPT

## 2024-04-04 ASSESSMENT — PAIN SCALES - GENERAL: PAINLEVEL: 0-NO PAIN

## 2024-04-04 NOTE — PROGRESS NOTES
"prednisonePatient ID: Joellen Narayan is a 23 y.o. male.  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 He came with his monther.  12/16/23: He was found to have retroperitoneal LN's and recent PET showed slight increase RPLN's in size, Interval development on multiple FDG avid focus within sternum, vertebral body, R acetabular wall and L femoral head. MRI C/T/L spine (12/20) slight decreased marrow signal throughout the spine consistent with chronic anemia in sickle cell disease. MRI Pelvis (12/20) T2 and FLAIR hyperintense signal of the R superior endplate of the L5 vertebral body, R superior acetabulum, and L femoral head corresponding to hypermetabolic lesions seen on PET that may represent osseous lymphomatous involvement. He underwent Bx per IR on 11/30/23 and path was c/w NLPHL(grade II) He denies recent night sweats, weight loss, n/v/d or abd pain. He also denies bowel/urinary problems.   1/18/24: here for chemotherapy. Since last visit, he was seen 2 x at ED with sickle cell crisis. Doing ok since.   2/8/24: here for cycle #2. Feels well today. Labs reviewed  3/7/24: missed C#3 last week due to sickle cell crisis. Feels ok today.  4/4/24: here for cycle #4. Doing ok. No new c/o        Review of Systems   All other systems reviewed and are negative.       Objective   BSA: 1.9 meters squared  /69 (BP Location: Left arm, Patient Position: Sitting, BP Cuff Size: Large adult)   Pulse 93   Temp 37 °C (98.6 °F)   Resp 18   Ht 1.853 m (6' 0.95\")   Wt 70.5 kg (155 lb 8 oz)   SpO2 97%   BMI 20.54 kg/m²     Family History   Problem Relation " Name Age of Onset    Sickle cell trait Mother      Sickle cell trait Father      Lung cancer Brother       Oncology History   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-abdominal lymph nodes (CMS/HCC)   12/19/2023 Initial Diagnosis    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-abdominal lymph nodes (CMS/HCC)     1/18/2024 -  Chemotherapy    R-CHOP (Cyclophosphamide / DOXOrubicin / VinCRIStine / PredniSONE) + RiTUXimab, 21 Day Cycles         Joellen Narayan  reports that he has never smoked. He has been exposed to tobacco smoke. He has never used smokeless tobacco.  He  reports no history of alcohol use.  He  reports no history of drug use.    Physical Exam  HENT:      Head: Normocephalic.   Eyes:      Pupils: Pupils are equal, round, and reactive to light.   Cardiovascular:      Rate and Rhythm: Normal rate.      Pulses: Normal pulses.      Comments: 2/6 AMADEO  Pulmonary:      Effort: Pulmonary effort is normal.      Breath sounds: Normal breath sounds.   Abdominal:      General: Abdomen is flat.      Palpations: Abdomen is soft.   Musculoskeletal:         General: Normal range of motion.      Cervical back: Normal range of motion and neck supple.   Neurological:      General: No focal deficit present.      Mental Status: He is alert.     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 He came with his monther.    NLPHL  - echocardiogram reviewed with patient  - elevated LDH/bili partially from sickle cell disease  - chemotherapy was discussed(R-CHOP).-> we decided to simplify his chemotherapy to Rituxan and prdnisone q 3 weeks mainly due to frequent sickle cell crisis  -tolerating chemo well.  - recent CT while  hospitalized showed significant improvement LN's on 2/16/24  - proceed with cycle C4 today.  - RTC 3 wks for C5    Sickle cell anemia    Hypoxia   On home O2 but not compliant  - encouraged him to use-> if not he will have more frequent SS crisis     RTC 3 wks for cycle #5      Melissa Stoll MD

## 2024-04-05 ENCOUNTER — PHARMACY VISIT (OUTPATIENT)
Dept: PHARMACY | Facility: CLINIC | Age: 24
End: 2024-04-05
Payer: MEDICARE

## 2024-04-05 ENCOUNTER — INFUSION (OUTPATIENT)
Dept: HEMATOLOGY/ONCOLOGY | Facility: HOSPITAL | Age: 24
End: 2024-04-05
Payer: COMMERCIAL

## 2024-04-05 VITALS
HEART RATE: 77 BPM | TEMPERATURE: 97.9 F | BODY MASS INDEX: 20.34 KG/M2 | RESPIRATION RATE: 18 BRPM | DIASTOLIC BLOOD PRESSURE: 61 MMHG | SYSTOLIC BLOOD PRESSURE: 134 MMHG | OXYGEN SATURATION: 99 % | WEIGHT: 154 LBS

## 2024-04-05 DIAGNOSIS — C81.03 NODULAR LYMPHOCYTE PREDOMINANT HODGKIN LYMPHOMA OF INTRA-ABDOMINAL LYMPH NODES (MULTI): ICD-10-CM

## 2024-04-05 PROCEDURE — 2500000001 HC RX 250 WO HCPCS SELF ADMINISTERED DRUGS (ALT 637 FOR MEDICARE OP)

## 2024-04-05 PROCEDURE — 96415 CHEMO IV INFUSION ADDL HR: CPT

## 2024-04-05 PROCEDURE — 2500000004 HC RX 250 GENERAL PHARMACY W/ HCPCS (ALT 636 FOR OP/ED): Mod: JZ | Performed by: INTERNAL MEDICINE

## 2024-04-05 PROCEDURE — 96374 THER/PROPH/DIAG INJ IV PUSH: CPT | Mod: INF

## 2024-04-05 PROCEDURE — 2500000004 HC RX 250 GENERAL PHARMACY W/ HCPCS (ALT 636 FOR OP/ED)

## 2024-04-05 PROCEDURE — 96413 CHEMO IV INFUSION 1 HR: CPT

## 2024-04-05 RX ORDER — ACETAMINOPHEN 325 MG/1
650 TABLET ORAL ONCE
Status: COMPLETED | OUTPATIENT
Start: 2024-04-05 | End: 2024-04-05

## 2024-04-05 RX ORDER — ALBUTEROL SULFATE 0.83 MG/ML
3 SOLUTION RESPIRATORY (INHALATION) AS NEEDED
Status: DISCONTINUED | OUTPATIENT
Start: 2024-04-05 | End: 2024-04-05 | Stop reason: HOSPADM

## 2024-04-05 RX ORDER — DIPHENHYDRAMINE HYDROCHLORIDE 50 MG/ML
50 INJECTION INTRAMUSCULAR; INTRAVENOUS AS NEEDED
Status: DISCONTINUED | OUTPATIENT
Start: 2024-04-05 | End: 2024-04-05 | Stop reason: HOSPADM

## 2024-04-05 RX ORDER — EPINEPHRINE 0.3 MG/.3ML
0.3 INJECTION SUBCUTANEOUS EVERY 5 MIN PRN
Status: DISCONTINUED | OUTPATIENT
Start: 2024-04-05 | End: 2024-04-05 | Stop reason: HOSPADM

## 2024-04-05 RX ORDER — FAMOTIDINE 10 MG/ML
20 INJECTION INTRAVENOUS ONCE AS NEEDED
Status: DISCONTINUED | OUTPATIENT
Start: 2024-04-05 | End: 2024-04-05 | Stop reason: HOSPADM

## 2024-04-05 RX ORDER — DIPHENHYDRAMINE HCL 50 MG
50 CAPSULE ORAL ONCE
Status: COMPLETED | OUTPATIENT
Start: 2024-04-05 | End: 2024-04-05

## 2024-04-05 RX ADMIN — METHYLPREDNISOLONE SODIUM SUCCINATE 125 MG: 125 INJECTION, POWDER, FOR SOLUTION INTRAMUSCULAR; INTRAVENOUS at 09:04

## 2024-04-05 RX ADMIN — DIPHENHYDRAMINE HYDROCHLORIDE 50 MG: 50 CAPSULE ORAL at 09:04

## 2024-04-05 RX ADMIN — ACETAMINOPHEN 650 MG: 325 TABLET ORAL at 09:04

## 2024-04-05 RX ADMIN — SODIUM CHLORIDE 700 MG: 9 INJECTION, SOLUTION INTRAVENOUS at 09:23

## 2024-04-05 ASSESSMENT — PAIN SCALES - GENERAL: PAINLEVEL: 0-NO PAIN

## 2024-04-05 NOTE — PROGRESS NOTES
Patient tolerated infusion well. No other needs at this time. Patient  was discharged from the infusion center.

## 2024-04-25 ENCOUNTER — TELEPHONE (OUTPATIENT)
Dept: HEMATOLOGY/ONCOLOGY | Facility: HOSPITAL | Age: 24
End: 2024-04-25
Payer: COMMERCIAL

## 2024-04-25 ENCOUNTER — APPOINTMENT (OUTPATIENT)
Dept: HEMATOLOGY/ONCOLOGY | Facility: HOSPITAL | Age: 24
End: 2024-04-25
Payer: COMMERCIAL

## 2024-04-25 NOTE — TELEPHONE ENCOUNTER
and myself both attempted to call Raundre about his missed MD and Infusion appointment today 4/25.  He did not answer but voicemail was left requesting a call back to be rescheduled.  Call back number provided.

## 2024-05-07 ENCOUNTER — TELEPHONE (OUTPATIENT)
Dept: HEMATOLOGY/ONCOLOGY | Facility: HOSPITAL | Age: 24
End: 2024-05-07
Payer: COMMERCIAL

## 2024-05-07 NOTE — TELEPHONE ENCOUNTER
Attempted to call Joellen and his mother due to his missed appointments, to get him an appointment with Dr. Stoll and for treatment.  His phone was disconnected at this time but I left a voicemail on his mom's phone to give us a call back to discuss a plan.  Call back number left.

## 2024-05-15 ENCOUNTER — TELEPHONE (OUTPATIENT)
Dept: HEMATOLOGY/ONCOLOGY | Facility: HOSPITAL | Age: 24
End: 2024-05-15
Payer: COMMERCIAL

## 2024-05-15 DIAGNOSIS — C81.03 NODULAR LYMPHOCYTE PREDOMINANT HODGKIN LYMPHOMA OF INTRA-ABDOMINAL LYMPH NODES (MULTI): ICD-10-CM

## 2024-05-15 NOTE — TELEPHONE ENCOUNTER
Spoke with Melissa (patients mother).  She was calling to see about getting a spot for treatment for Rabeatrizre.  I was able to get him a spot tomorrow to see Dr. Stoll and then to get treatment.  I explained that they should get here are 1040 for his MD appointment and then will get treatment after.  She confirmed this and had no further questions.

## 2024-05-16 ENCOUNTER — LAB (OUTPATIENT)
Dept: LAB | Facility: HOSPITAL | Age: 24
End: 2024-05-16
Payer: COMMERCIAL

## 2024-05-16 ENCOUNTER — APPOINTMENT (OUTPATIENT)
Dept: HEMATOLOGY/ONCOLOGY | Facility: CLINIC | Age: 24
End: 2024-05-16
Payer: COMMERCIAL

## 2024-05-16 ENCOUNTER — DOCUMENTATION (OUTPATIENT)
Dept: PASTORAL CARE | Facility: HOSPITAL | Age: 24
End: 2024-05-16

## 2024-05-16 ENCOUNTER — INFUSION (OUTPATIENT)
Dept: HEMATOLOGY/ONCOLOGY | Facility: HOSPITAL | Age: 24
End: 2024-05-16
Payer: COMMERCIAL

## 2024-05-16 ENCOUNTER — OFFICE VISIT (OUTPATIENT)
Dept: HEMATOLOGY/ONCOLOGY | Facility: HOSPITAL | Age: 24
End: 2024-05-16
Payer: COMMERCIAL

## 2024-05-16 VITALS
RESPIRATION RATE: 17 BRPM | WEIGHT: 156.75 LBS | SYSTOLIC BLOOD PRESSURE: 125 MMHG | TEMPERATURE: 97 F | BODY MASS INDEX: 20.71 KG/M2 | OXYGEN SATURATION: 93 % | DIASTOLIC BLOOD PRESSURE: 75 MMHG | HEART RATE: 80 BPM

## 2024-05-16 DIAGNOSIS — C81.03 NODULAR LYMPHOCYTE PREDOMINANT HODGKIN LYMPHOMA OF INTRA-ABDOMINAL LYMPH NODES (MULTI): ICD-10-CM

## 2024-05-16 LAB
ALBUMIN SERPL BCP-MCNC: 4.9 G/DL (ref 3.4–5)
ALP SERPL-CCNC: 113 U/L (ref 33–120)
ALT SERPL W P-5'-P-CCNC: 24 U/L (ref 10–52)
ANION GAP SERPL CALC-SCNC: 13 MMOL/L (ref 10–20)
AST SERPL W P-5'-P-CCNC: 48 U/L (ref 9–39)
BASOPHILS # BLD AUTO: 0.05 X10*3/UL (ref 0–0.1)
BASOPHILS NFR BLD AUTO: 0.5 %
BILIRUB SERPL-MCNC: 7.1 MG/DL (ref 0–1.2)
BUN SERPL-MCNC: 6 MG/DL (ref 6–23)
CALCIUM SERPL-MCNC: 9.7 MG/DL (ref 8.6–10.3)
CHLORIDE SERPL-SCNC: 106 MMOL/L (ref 98–107)
CO2 SERPL-SCNC: 25 MMOL/L (ref 21–32)
CREAT SERPL-MCNC: 0.64 MG/DL (ref 0.5–1.3)
EGFRCR SERPLBLD CKD-EPI 2021: >90 ML/MIN/1.73M*2
EOSINOPHIL # BLD AUTO: 0.13 X10*3/UL (ref 0–0.7)
EOSINOPHIL NFR BLD AUTO: 1.2 %
ERYTHROCYTE [DISTWIDTH] IN BLOOD BY AUTOMATED COUNT: 23.6 % (ref 11.5–14.5)
GIANT PLATELETS BLD QL SMEAR: NORMAL
GLUCOSE SERPL-MCNC: 72 MG/DL (ref 74–99)
HCT VFR BLD AUTO: 26.1 % (ref 41–52)
HGB BLD-MCNC: 9.9 G/DL (ref 13.5–17.5)
HOWELL-JOLLY BOD BLD QL SMEAR: PRESENT
IMM GRANULOCYTES # BLD AUTO: 0.12 X10*3/UL (ref 0–0.7)
IMM GRANULOCYTES NFR BLD AUTO: 1.1 % (ref 0–0.9)
LDH SERPL L TO P-CCNC: 464 U/L (ref 84–246)
LYMPHOCYTES # BLD AUTO: 2.31 X10*3/UL (ref 1.2–4.8)
LYMPHOCYTES NFR BLD AUTO: 21.6 %
MCH RBC QN AUTO: 30.4 PG (ref 26–34)
MCHC RBC AUTO-ENTMCNC: 37.9 G/DL (ref 32–36)
MCV RBC AUTO: 80 FL (ref 80–100)
MONOCYTES # BLD AUTO: 1.43 X10*3/UL (ref 0.1–1)
MONOCYTES NFR BLD AUTO: 13.4 %
NEUTROPHILS # BLD AUTO: 6.63 X10*3/UL (ref 1.2–7.7)
NEUTROPHILS NFR BLD AUTO: 62.2 %
NRBC BLD-RTO: 0.5 /100 WBCS (ref 0–0)
PAPPENHEIMER BOD BLD QL SMEAR: PRESENT
PLATELET # BLD AUTO: 347 X10*3/UL (ref 150–450)
POLYCHROMASIA BLD QL SMEAR: NORMAL
POTASSIUM SERPL-SCNC: 4.3 MMOL/L (ref 3.5–5.3)
PROT SERPL-MCNC: 7.3 G/DL (ref 6.4–8.2)
RBC # BLD AUTO: 3.26 X10*6/UL (ref 4.5–5.9)
RBC MORPH BLD: NORMAL
SICKLE CELLS BLD QL SMEAR: NORMAL
SODIUM SERPL-SCNC: 140 MMOL/L (ref 136–145)
TARGETS BLD QL SMEAR: NORMAL
WBC # BLD AUTO: 10.7 X10*3/UL (ref 4.4–11.3)

## 2024-05-16 PROCEDURE — 2500000004 HC RX 250 GENERAL PHARMACY W/ HCPCS (ALT 636 FOR OP/ED): Performed by: INTERNAL MEDICINE

## 2024-05-16 PROCEDURE — 85025 COMPLETE CBC W/AUTO DIFF WBC: CPT

## 2024-05-16 PROCEDURE — 3078F DIAST BP <80 MM HG: CPT | Performed by: INTERNAL MEDICINE

## 2024-05-16 PROCEDURE — 96413 CHEMO IV INFUSION 1 HR: CPT

## 2024-05-16 PROCEDURE — 36415 COLL VENOUS BLD VENIPUNCTURE: CPT

## 2024-05-16 PROCEDURE — 84075 ASSAY ALKALINE PHOSPHATASE: CPT

## 2024-05-16 PROCEDURE — 3074F SYST BP LT 130 MM HG: CPT | Performed by: INTERNAL MEDICINE

## 2024-05-16 PROCEDURE — 83615 LACTATE (LD) (LDH) ENZYME: CPT

## 2024-05-16 PROCEDURE — 99215 OFFICE O/P EST HI 40 MIN: CPT | Performed by: INTERNAL MEDICINE

## 2024-05-16 PROCEDURE — 96375 TX/PRO/DX INJ NEW DRUG ADDON: CPT | Mod: INF

## 2024-05-16 PROCEDURE — 1036F TOBACCO NON-USER: CPT | Performed by: INTERNAL MEDICINE

## 2024-05-16 PROCEDURE — 2500000001 HC RX 250 WO HCPCS SELF ADMINISTERED DRUGS (ALT 637 FOR MEDICARE OP): Performed by: INTERNAL MEDICINE

## 2024-05-16 RX ORDER — ALBUTEROL SULFATE 0.83 MG/ML
3 SOLUTION RESPIRATORY (INHALATION) AS NEEDED
Status: DISCONTINUED | OUTPATIENT
Start: 2024-05-16 | End: 2024-05-16 | Stop reason: HOSPADM

## 2024-05-16 RX ORDER — ACETAMINOPHEN 325 MG/1
650 TABLET ORAL ONCE
Status: COMPLETED | OUTPATIENT
Start: 2024-05-16 | End: 2024-05-16

## 2024-05-16 RX ORDER — EPINEPHRINE 0.3 MG/.3ML
0.3 INJECTION SUBCUTANEOUS EVERY 5 MIN PRN
Status: DISCONTINUED | OUTPATIENT
Start: 2024-05-16 | End: 2024-05-16 | Stop reason: HOSPADM

## 2024-05-16 RX ORDER — DIPHENHYDRAMINE HCL 50 MG
50 CAPSULE ORAL ONCE
Status: COMPLETED | OUTPATIENT
Start: 2024-05-16 | End: 2024-05-16

## 2024-05-16 RX ORDER — DIPHENHYDRAMINE HYDROCHLORIDE 50 MG/ML
50 INJECTION INTRAMUSCULAR; INTRAVENOUS AS NEEDED
Status: DISCONTINUED | OUTPATIENT
Start: 2024-05-16 | End: 2024-05-16 | Stop reason: HOSPADM

## 2024-05-16 RX ORDER — HEPARIN SODIUM,PORCINE/PF 10 UNIT/ML
50 SYRINGE (ML) INTRAVENOUS AS NEEDED
Status: CANCELLED | OUTPATIENT
Start: 2024-05-16

## 2024-05-16 RX ORDER — FAMOTIDINE 10 MG/ML
20 INJECTION INTRAVENOUS ONCE AS NEEDED
Status: DISCONTINUED | OUTPATIENT
Start: 2024-05-16 | End: 2024-05-16 | Stop reason: HOSPADM

## 2024-05-16 RX ORDER — HEPARIN 100 UNIT/ML
500 SYRINGE INTRAVENOUS AS NEEDED
Status: CANCELLED | OUTPATIENT
Start: 2024-05-16

## 2024-05-16 RX ADMIN — DIPHENHYDRAMINE HYDROCHLORIDE 50 MG: 50 CAPSULE ORAL at 12:38

## 2024-05-16 RX ADMIN — ACETAMINOPHEN 650 MG: 325 TABLET ORAL at 12:39

## 2024-05-16 RX ADMIN — METHYLPREDNISOLONE SODIUM SUCCINATE 125 MG: 125 INJECTION, POWDER, FOR SOLUTION INTRAMUSCULAR; INTRAVENOUS at 12:37

## 2024-05-16 RX ADMIN — SODIUM CHLORIDE 700 MG: 9 INJECTION, SOLUTION INTRAVENOUS at 13:05

## 2024-05-16 ASSESSMENT — PAIN SCALES - GENERAL: PAINLEVEL: 0-NO PAIN

## 2024-05-16 NOTE — PROGRESS NOTES
Spiritual Care Visit    Clinical Encounter Type  Visited With: Patient and family together  Routine Visit: Follow-up  Continue Visiting: Yes     visited patient Joellen Narayan and his mother while patient was receiving infusion. Patient shared that he has been doing well with treatments and that he has not had a pain crisis in months. Patient shared that not much has been going on apart from working. Patient's mother shared that she has two other children, and that her other son has a terminal illness. She spoke of taking time with him and taking each day as it comes.  provided care through reflective listening and supportive conversation. Patient and mother were appreciative of visit and did not have any needs at this time. Spiritual Care remains available as needed/requested.    Rev. Diana Álvarez MDiv, BCC

## 2024-05-16 NOTE — PROGRESS NOTES
prednisonePatient ID: Joellen Narayan is a 23 y.o. male.  Referring Physician: No referring provider defined for this encounter.  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 He came with his monther.  12/16/23: He was found to have retroperitoneal LN's and recent PET showed slight increase RPLN's in size, Interval development on multiple FDG avid focus within sternum, vertebral body, R acetabular wall and L femoral head. MRI C/T/L spine (12/20) slight decreased marrow signal throughout the spine consistent with chronic anemia in sickle cell disease. MRI Pelvis (12/20) T2 and FLAIR hyperintense signal of the R superior endplate of the L5 vertebral body, R superior acetabulum, and L femoral head corresponding to hypermetabolic lesions seen on PET that may represent osseous lymphomatous involvement. He underwent paraaortic LN Bx per IR on 11/30/23 and path was c/w NLPHL(grade II) He denies recent night sweats, weight loss, n/v/d or abd pain. He also denies bowel/urinary problems.   1/18/24: here for chemotherapy. Since last visit, he was seen 2 x at ED with sickle cell crisis. Doing ok since.   2/8/24: here for cycle #2. Feels well today. Labs reviewed  3/7/24: missed C#3 last week due to sickle cell crisis. Feels ok today.  4/4/24: here for cycle #4. Doing ok. No new c/o  5/16/24: he missed his appointment and his phone was disconnected. Was able to talk to hi mom and now he is here for cycle #5 Rituxan/prednisone. No other c/o        Review of Systems   All other systems reviewed and are negative.       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 He has been exposed to tobacco smoke. He has never used smokeless tobacco.  He  reports no history of alcohol use.  He  reports no history of drug use.    Physical Exam  HENT:      Head: Normocephalic.   Eyes:      Pupils: Pupils are equal, round, and reactive to light.   Cardiovascular:      Rate and Rhythm: Normal rate.      Pulses: Normal pulses.      Comments: 2/6 AMADEO  Pulmonary:      Effort: Pulmonary effort is normal.      Breath sounds: Normal breath sounds.   Abdominal:      General: Abdomen is flat.      Palpations: Abdomen is soft.   Musculoskeletal:         General: Normal range of motion.      Cervical back: Normal range of motion and neck supple.   Neurological:      General: No focal deficit present.      Mental Status: He is alert.     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 He came with his monther.    NLPHL  - echocardiogram reviewed with patient  - elevated LDH/bili partially from sickle cell disease  - chemotherapy was discussed(R-CHOP).-> we decided to simplify his chemotherapy to Rituxan and prdnisone q 3 weeks mainly due to frequent sickle  cell crisis  -tolerating chemo well.  - recent CT while hospitalized showed significant improvement LN's on 2/16/24  - proceed with cycle C5 today.(3 weeks delayed)  - RTC 3 wks for C6 and last chemo    Sickle cell anemia    Hypoxia   On home O2 but not compliant  - encouraged him to use-> if not he will have more frequent SS crisis     RTC 3 wks for cycle #6      Melissa Stoll MD

## 2024-05-16 NOTE — LETTER
May 16, 2024     Patient: Joellen Narayan   YOB: 2000   Date of Visit: 5/16/2024       To Whom It May Concern:    Melissa Narayan accompanied her son Joellen Narayan who was seen in my clinic on 5/16/2024 at 10:40 am. She also accompanied him to his infusion appointment that followed this appointment.  Please excuse Melissa for her absence from work on this day to make the appointments.    If you have any questions or concerns, please don't hesitate to call [phone].         Sincerely,         Melissa Stoll MD

## 2024-06-04 ENCOUNTER — APPOINTMENT (OUTPATIENT)
Dept: HEMATOLOGY/ONCOLOGY | Facility: HOSPITAL | Age: 24
End: 2024-06-04
Payer: COMMERCIAL

## 2024-06-04 DIAGNOSIS — C81.03 NODULAR LYMPHOCYTE PREDOMINANT HODGKIN LYMPHOMA OF INTRA-ABDOMINAL LYMPH NODES (MULTI): ICD-10-CM

## 2024-06-04 DIAGNOSIS — C81.03 NODULAR LYMPHOCYTE PREDOMINANT HODGKIN LYMPHOMA OF INTRA-ABDOMINAL LYMPH NODES (MULTI): Primary | ICD-10-CM

## 2024-06-04 NOTE — PROGRESS NOTES
Patient ID: Joellen Narayan is a 23 y.o. male.  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 He came with his monther.  12/16/23: He was found to have retroperitoneal LN's and recent PET showed slight increase RPLN's in size, Interval development on multiple FDG avid focus within sternum, vertebral body, R acetabular wall and L femoral head. MRI C/T/L spine (12/20) slight decreased marrow signal throughout the spine consistent with chronic anemia in sickle cell disease. MRI Pelvis (12/20) T2 and FLAIR hyperintense signal of the R superior endplate of the L5 vertebral body, R superior acetabulum, and L femoral head corresponding to hypermetabolic lesions seen on PET that may represent osseous lymphomatous involvement. He underwent paraaortic LN Bx per IR on 11/30/23 and path was c/w NLPHL(grade II) He denies recent night sweats, weight loss, n/v/d or abd pain. He also denies bowel/urinary problems.   1/18/24: here for chemotherapy. Since last visit, he was seen 2 x at ED with sickle cell crisis. Doing ok since.   2/8/24: here for cycle #2. Feels well today. Labs reviewed  3/7/24: missed C#3 last week due to sickle cell crisis. Feels ok today.  4/4/24: here for cycle #4. Doing ok. No new c/o  5/16/24: he missed his appointment and his phone was disconnected. Was able to talk to hi mom and now he is here for cycle #5 Rituxan/prednisone. No other c/o        Review of Systems   All other systems reviewed and are negative.       Objective   BSA: There is no height or weight on file to calculate BSA.  There were no  vitals taken for this visit.    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 He has been exposed to tobacco smoke. He has never used smokeless tobacco.  He  reports no history of alcohol use.  He  reports no history of drug use.    Physical Exam  HENT:      Head: Normocephalic.   Eyes:      Pupils: Pupils are equal, round, and reactive to light.   Cardiovascular:      Rate and Rhythm: Normal rate.      Pulses: Normal pulses.      Comments: 2/6 AMADEO  Pulmonary:      Effort: Pulmonary effort is normal.      Breath sounds: Normal breath sounds.   Abdominal:      General: Abdomen is flat.      Palpations: Abdomen is soft.   Musculoskeletal:         General: Normal range of motion.      Cervical back: Normal range of motion and neck supple.   Neurological:      General: No focal deficit present.      Mental Status: He is alert.       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 He came with his monther.    NLPHL  - echocardiogram reviewed with patient  - elevated LDH/bili partially from sickle cell disease  - chemotherapy was discussed(R-CHOP).-> we decided to simplify his chemotherapy to Rituxan and prednisone q 3 weeks mainly due to frequent  sickle cell crisis  -tolerating chemo well.  - recent CT while hospitalized showed significant improvement LN's on 2/16/24  - proceed with cycle C5 today.(3 weeks delayed)  - RTC 3 wks for C6 and last chemo    Sickle cell anemia    Hypoxia   On home O2 but not compliant  - encouraged him to use-> if not he will have more frequent SS crisis     RTC 3 wks for cycle #6      Gabriel Garrido, RAAD-CNP

## 2024-06-06 ENCOUNTER — TELEPHONE (OUTPATIENT)
Dept: HEMATOLOGY/ONCOLOGY | Facility: HOSPITAL | Age: 24
End: 2024-06-06
Payer: COMMERCIAL

## 2024-06-06 ENCOUNTER — TELEMEDICINE (OUTPATIENT)
Dept: HEMATOLOGY/ONCOLOGY | Facility: HOSPITAL | Age: 24
End: 2024-06-06
Payer: COMMERCIAL

## 2024-06-06 DIAGNOSIS — D57.00 SICKLE-CELL DISEASE WITH PAIN (MULTI): ICD-10-CM

## 2024-06-06 DIAGNOSIS — Z51.11 ENCOUNTER FOR ANTINEOPLASTIC CHEMOTHERAPY AND IMMUNOTHERAPY: ICD-10-CM

## 2024-06-06 DIAGNOSIS — Z51.12 ENCOUNTER FOR ANTINEOPLASTIC CHEMOTHERAPY AND IMMUNOTHERAPY: ICD-10-CM

## 2024-06-06 DIAGNOSIS — C81.03 NODULAR LYMPHOCYTE PREDOMINANT HODGKIN LYMPHOMA OF INTRA-ABDOMINAL LYMPH NODES (MULTI): Primary | ICD-10-CM

## 2024-06-06 PROCEDURE — 99443 PR PHYS/QHP TELEPHONE EVALUATION 21-30 MIN: CPT

## 2024-06-06 ASSESSMENT — ENCOUNTER SYMPTOMS
HEMATOLOGIC/LYMPHATIC NEGATIVE: 1
CONSTITUTIONAL NEGATIVE: 1
RESPIRATORY NEGATIVE: 1
NEUROLOGICAL NEGATIVE: 1
MUSCULOSKELETAL NEGATIVE: 1
PSYCHIATRIC NEGATIVE: 1
GASTROINTESTINAL NEGATIVE: 1
CARDIOVASCULAR NEGATIVE: 1
ENDOCRINE NEGATIVE: 1
EYES NEGATIVE: 1

## 2024-06-06 NOTE — TELEPHONE ENCOUNTER
Joellen Moreland called back and said please call at [phone]. Sent secure chat also.  Thanks.

## 2024-06-06 NOTE — PROGRESS NOTES
prednisonePatient ID: Joellen Narayan is a 23 y.o. male.  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 He came with his monther.    12/16/23: He was found to have retroperitoneal LN's and recent PET showed slight increase RPLN's in size, Interval development on multiple FDG avid focus within sternum, vertebral body, R acetabular wall and L femoral head. MRI C/T/L spine (12/20) slight decreased marrow signal throughout the spine consistent with chronic anemia in sickle cell disease. MRI Pelvis (12/20) T2 and FLAIR hyperintense signal of the R superior endplate of the L5 vertebral body, R superior acetabulum, and L femoral head corresponding to hypermetabolic lesions seen on PET that may represent osseous lymphomatous involvement. He underwent paraaortic LN Bx per IR on 11/30/23 and path was c/w NLPHL(grade II) He denies recent night sweats, weight loss, n/v/d or abd pain. He also denies bowel/urinary problems.     Lani Cuenca presents to clinic 6/6/24 for a follow up phone call with his mother, Melissa.    Overall he reports feeling well and is without any acute complaints.       Review of Systems   Constitutional: Negative.    HENT:  Negative.     Eyes: Negative.    Respiratory: Negative.     Cardiovascular: Negative.    Gastrointestinal: Negative.    Endocrine: Negative.    Genitourinary: Negative.     Musculoskeletal: Negative.    Skin: Negative.    Neurological: Negative.    Hematological: Negative.    Psychiatric/Behavioral: Negative.        Objective   BSA: There is no height or weight on file to calculate BSA.  There were no vitals taken  for this visit.    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 He has been exposed to tobacco smoke. He has never used smokeless tobacco.  He  reports no history of alcohol use.  He  reports no history of drug use.    Performance Status:  Asymptomatic    Assessment/Plan    Patient is a 23-year-old male with a past medical history of sickle cell disease (C/B splenic/sequestration, priapism, and acute chest syndrome), and newly diagnosed nodular lymphocyte predominant Hodgkins lymphoma(NLPHL).    NLPHL  - echocardiogram reviewed with patient  - elevated LDH/bili partially from sickle cell disease  - chemotherapy was discussed(R-CHOP).-> we decided to simplify his chemotherapy to Rituxan and prednisone q 3 weeks mainly due to frequent sickle cell crisis  -tolerating chemo well.  - recent CT while hospitalized showed significant improvement LN's on 2/16/24  - C6 6/7/24    Sickle cell anemia    Hypoxia   On home O2 but not compliant  - encouraged him to use-> if not he will have more frequent SS crisis     RTC  6/7 Final cycle of chemo  Dr. Stoll in 1 month     Gabriel Garrido, RAAD-CNP

## 2024-06-06 NOTE — TELEPHONE ENCOUNTER
Left Melissa a VM regarding Krystian's phone appointment, no answer. Left her a detailed VM w/ call back number.

## 2024-06-07 ENCOUNTER — INFUSION (OUTPATIENT)
Dept: HEMATOLOGY/ONCOLOGY | Facility: HOSPITAL | Age: 24
End: 2024-06-07
Payer: COMMERCIAL

## 2024-06-07 VITALS
OXYGEN SATURATION: 91 % | TEMPERATURE: 99 F | BODY MASS INDEX: 20.62 KG/M2 | WEIGHT: 156.1 LBS | HEART RATE: 62 BPM | DIASTOLIC BLOOD PRESSURE: 62 MMHG | SYSTOLIC BLOOD PRESSURE: 129 MMHG | RESPIRATION RATE: 18 BRPM

## 2024-06-07 DIAGNOSIS — C81.03 NODULAR LYMPHOCYTE PREDOMINANT HODGKIN LYMPHOMA OF INTRA-ABDOMINAL LYMPH NODES (MULTI): ICD-10-CM

## 2024-06-07 LAB
ALBUMIN SERPL BCP-MCNC: 4.9 G/DL (ref 3.4–5)
ALP SERPL-CCNC: 127 U/L (ref 33–120)
ALT SERPL W P-5'-P-CCNC: 28 U/L (ref 10–52)
ANION GAP SERPL CALC-SCNC: 14 MMOL/L (ref 10–20)
AST SERPL W P-5'-P-CCNC: 52 U/L (ref 9–39)
BASOPHILS # BLD AUTO: 0.05 X10*3/UL (ref 0–0.1)
BASOPHILS NFR BLD AUTO: 0.5 %
BILIRUB SERPL-MCNC: 10.4 MG/DL (ref 0–1.2)
BUN SERPL-MCNC: 4 MG/DL (ref 6–23)
BURR CELLS BLD QL SMEAR: NORMAL
CALCIUM SERPL-MCNC: 9.5 MG/DL (ref 8.6–10.3)
CHLORIDE SERPL-SCNC: 104 MMOL/L (ref 98–107)
CO2 SERPL-SCNC: 25 MMOL/L (ref 21–32)
CREAT SERPL-MCNC: 0.7 MG/DL (ref 0.5–1.3)
DACRYOCYTES BLD QL SMEAR: NORMAL
EGFRCR SERPLBLD CKD-EPI 2021: >90 ML/MIN/1.73M*2
EOSINOPHIL # BLD AUTO: 0.28 X10*3/UL (ref 0–0.7)
EOSINOPHIL NFR BLD AUTO: 2.7 %
ERYTHROCYTE [DISTWIDTH] IN BLOOD BY AUTOMATED COUNT: 26.5 % (ref 11.5–14.5)
GLUCOSE SERPL-MCNC: 84 MG/DL (ref 74–99)
HCT VFR BLD AUTO: 23.6 % (ref 41–52)
HGB BLD-MCNC: 8.8 G/DL (ref 13.5–17.5)
HOWELL-JOLLY BOD BLD QL SMEAR: PRESENT
HYPOCHROMIA BLD QL SMEAR: NORMAL
IMM GRANULOCYTES # BLD AUTO: 0.1 X10*3/UL (ref 0–0.7)
IMM GRANULOCYTES NFR BLD AUTO: 1 % (ref 0–0.9)
LDH SERPL L TO P-CCNC: 623 U/L (ref 84–246)
LYMPHOCYTES # BLD AUTO: 3.18 X10*3/UL (ref 1.2–4.8)
LYMPHOCYTES NFR BLD AUTO: 30.7 %
MCH RBC QN AUTO: 31.2 PG (ref 26–34)
MCHC RBC AUTO-ENTMCNC: 37.3 G/DL (ref 32–36)
MCV RBC AUTO: 84 FL (ref 80–100)
MONOCYTES # BLD AUTO: 1.27 X10*3/UL (ref 0.1–1)
MONOCYTES NFR BLD AUTO: 12.3 %
NEUTROPHILS # BLD AUTO: 5.47 X10*3/UL (ref 1.2–7.7)
NEUTROPHILS NFR BLD AUTO: 52.8 %
NRBC BLD-RTO: 1.2 /100 WBCS (ref 0–0)
PAPPENHEIMER BOD BLD QL SMEAR: PRESENT
PLATELET # BLD AUTO: 332 X10*3/UL (ref 150–450)
POLYCHROMASIA BLD QL SMEAR: NORMAL
POTASSIUM SERPL-SCNC: 4.2 MMOL/L (ref 3.5–5.3)
PROT SERPL-MCNC: 7.3 G/DL (ref 6.4–8.2)
RBC # BLD AUTO: 2.82 X10*6/UL (ref 4.5–5.9)
RBC MORPH BLD: NORMAL
SICKLE CELLS BLD QL SMEAR: NORMAL
SODIUM SERPL-SCNC: 139 MMOL/L (ref 136–145)
TARGETS BLD QL SMEAR: NORMAL
WBC # BLD AUTO: 10.4 X10*3/UL (ref 4.4–11.3)

## 2024-06-07 PROCEDURE — 80053 COMPREHEN METABOLIC PANEL: CPT

## 2024-06-07 PROCEDURE — 2500000004 HC RX 250 GENERAL PHARMACY W/ HCPCS (ALT 636 FOR OP/ED): Performed by: INTERNAL MEDICINE

## 2024-06-07 PROCEDURE — 2500000004 HC RX 250 GENERAL PHARMACY W/ HCPCS (ALT 636 FOR OP/ED): Mod: JZ | Performed by: INTERNAL MEDICINE

## 2024-06-07 PROCEDURE — 2500000001 HC RX 250 WO HCPCS SELF ADMINISTERED DRUGS (ALT 637 FOR MEDICARE OP): Performed by: INTERNAL MEDICINE

## 2024-06-07 PROCEDURE — 83615 LACTATE (LD) (LDH) ENZYME: CPT

## 2024-06-07 PROCEDURE — 85025 COMPLETE CBC W/AUTO DIFF WBC: CPT

## 2024-06-07 PROCEDURE — 96415 CHEMO IV INFUSION ADDL HR: CPT

## 2024-06-07 PROCEDURE — 96413 CHEMO IV INFUSION 1 HR: CPT

## 2024-06-07 PROCEDURE — 96375 TX/PRO/DX INJ NEW DRUG ADDON: CPT | Mod: INF

## 2024-06-07 RX ORDER — ALBUTEROL SULFATE 0.83 MG/ML
3 SOLUTION RESPIRATORY (INHALATION) AS NEEDED
Status: DISCONTINUED | OUTPATIENT
Start: 2024-06-07 | End: 2024-06-07 | Stop reason: HOSPADM

## 2024-06-07 RX ORDER — DIPHENHYDRAMINE HYDROCHLORIDE 50 MG/ML
50 INJECTION INTRAMUSCULAR; INTRAVENOUS AS NEEDED
Status: DISCONTINUED | OUTPATIENT
Start: 2024-06-07 | End: 2024-06-07 | Stop reason: HOSPADM

## 2024-06-07 RX ORDER — HEPARIN 100 UNIT/ML
500 SYRINGE INTRAVENOUS AS NEEDED
OUTPATIENT
Start: 2024-06-07

## 2024-06-07 RX ORDER — ACETAMINOPHEN 325 MG/1
650 TABLET ORAL ONCE
Status: COMPLETED | OUTPATIENT
Start: 2024-06-07 | End: 2024-06-07

## 2024-06-07 RX ORDER — FAMOTIDINE 10 MG/ML
20 INJECTION INTRAVENOUS ONCE AS NEEDED
Status: DISCONTINUED | OUTPATIENT
Start: 2024-06-07 | End: 2024-06-07 | Stop reason: HOSPADM

## 2024-06-07 RX ORDER — DIPHENHYDRAMINE HCL 50 MG
50 CAPSULE ORAL ONCE
Status: COMPLETED | OUTPATIENT
Start: 2024-06-07 | End: 2024-06-07

## 2024-06-07 RX ORDER — HEPARIN SODIUM,PORCINE/PF 10 UNIT/ML
50 SYRINGE (ML) INTRAVENOUS AS NEEDED
OUTPATIENT
Start: 2024-06-07

## 2024-06-07 RX ORDER — EPINEPHRINE 0.3 MG/.3ML
0.3 INJECTION SUBCUTANEOUS EVERY 5 MIN PRN
Status: DISCONTINUED | OUTPATIENT
Start: 2024-06-07 | End: 2024-06-07 | Stop reason: HOSPADM

## 2024-06-07 RX ADMIN — ACETAMINOPHEN 650 MG: 325 TABLET ORAL at 17:09

## 2024-06-07 RX ADMIN — METHYLPREDNISOLONE SODIUM SUCCINATE 125 MG: 125 INJECTION, POWDER, FOR SOLUTION INTRAMUSCULAR; INTRAVENOUS at 17:09

## 2024-06-07 RX ADMIN — DIPHENHYDRAMINE HYDROCHLORIDE 50 MG: 50 CAPSULE ORAL at 17:09

## 2024-06-07 RX ADMIN — SODIUM CHLORIDE 700 MG: 9 INJECTION, SOLUTION INTRAVENOUS at 17:33

## 2024-06-07 ASSESSMENT — PAIN SCALES - GENERAL: PAINLEVEL: 0-NO PAIN

## 2024-06-07 NOTE — PROGRESS NOTES
Patient for Rituxan infusion. Tolerated infusion well, no issues noted or reported. Patient discharged home with family member, ambulated from department under own power, no acute distress noted.

## 2024-06-09 NOTE — PROGRESS NOTES
Music Therapy Note    Joellen Narayan     Therapy Session  Referral Type: New referral this admission  Visit Type: New visit  Session Start Time: 1337  Session End Time: 1342  Intervention Delivery: In-person  Conflict of Service: None  Family Present for Session: None     Pre-assessment  Unable to Assess Reason: Outcomes not applicable  Mood/Affect: Appropriate, Calm, Cooperative         Treatment/Interventions  Music Therapy Interventions: Assessment  Interruption: No  Patient Fell Asleep at End of Session: No    Post-assessment  Unable to Assess Reason: Did not provide expressive therapy intervention  Mood/Affect: Appropriate, Calm, Cooperative  Continue Visiting: No  Total Session Time (min): 5 minutes    Narrative  Assessment Detail: Patient presented awake and alert, in bed with HOB raised, displaying calm affect. MT introduced self and role and pt was receptive to music therapy education. Patient getting discharged this day, but was appreciative to learn about music therapy for future admissions if needed. MT provided their business card.  Follow-up: MT to continue to follow for future appointments/admissions as requested.    Education Documentation  No documentation found.           Patient

## 2024-06-10 DIAGNOSIS — C81.03 NODULAR LYMPHOCYTE PREDOMINANT HODGKIN LYMPHOMA OF INTRA-ABDOMINAL LYMPH NODES (MULTI): Primary | ICD-10-CM

## 2024-06-18 ENCOUNTER — APPOINTMENT (OUTPATIENT)
Dept: RADIOLOGY | Facility: HOSPITAL | Age: 24
DRG: 812 | End: 2024-06-18
Payer: COMMERCIAL

## 2024-06-18 ENCOUNTER — CLINICAL SUPPORT (OUTPATIENT)
Dept: EMERGENCY MEDICINE | Facility: HOSPITAL | Age: 24
DRG: 812 | End: 2024-06-18
Payer: COMMERCIAL

## 2024-06-18 ENCOUNTER — HOSPITAL ENCOUNTER (INPATIENT)
Facility: HOSPITAL | Age: 24
DRG: 812 | End: 2024-06-18
Attending: EMERGENCY MEDICINE | Admitting: INTERNAL MEDICINE
Payer: COMMERCIAL

## 2024-06-18 ENCOUNTER — TELEPHONE (OUTPATIENT)
Dept: HEMATOLOGY/ONCOLOGY | Facility: HOSPITAL | Age: 24
End: 2024-06-18

## 2024-06-18 DIAGNOSIS — R09.02 HYPOXIA: ICD-10-CM

## 2024-06-18 DIAGNOSIS — D57.00 SICKLE CELL DISEASE WITH CRISIS (MULTI): ICD-10-CM

## 2024-06-18 DIAGNOSIS — J33.8 POLYP OF NASAL SINUS: ICD-10-CM

## 2024-06-18 DIAGNOSIS — N48.30 PRIAPISM: ICD-10-CM

## 2024-06-18 DIAGNOSIS — D57.00 SICKLE CELL CRISIS (MULTI): Primary | ICD-10-CM

## 2024-06-18 LAB
ABO GROUP (TYPE) IN BLOOD: NORMAL
ALBUMIN SERPL BCP-MCNC: 4.6 G/DL (ref 3.4–5)
ALP SERPL-CCNC: 109 U/L (ref 33–120)
ALT SERPL W P-5'-P-CCNC: 18 U/L (ref 10–52)
ANION GAP BLDV CALCULATED.4IONS-SCNC: 11 MMOL/L (ref 10–25)
ANION GAP SERPL CALC-SCNC: 13 MMOL/L (ref 10–20)
ANTIBODY SCREEN: NORMAL
APPEARANCE UR: CLEAR
APTT PPP: 26 SECONDS (ref 27–38)
AST SERPL W P-5'-P-CCNC: 44 U/L (ref 9–39)
BASE EXCESS BLDV CALC-SCNC: -2.8 MMOL/L (ref -2–3)
BASOPHILS # BLD AUTO: 0.03 X10*3/UL (ref 0–0.1)
BASOPHILS NFR BLD AUTO: 0.2 %
BILIRUB SERPL-MCNC: 9.8 MG/DL (ref 0–1.2)
BILIRUB UR STRIP.AUTO-MCNC: NEGATIVE MG/DL
BODY TEMPERATURE: 37 DEGREES CELSIUS
BUN SERPL-MCNC: 9 MG/DL (ref 6–23)
CA-I BLDV-SCNC: 1.24 MMOL/L (ref 1.1–1.33)
CALCIUM SERPL-MCNC: 9.2 MG/DL (ref 8.6–10.6)
CARDIAC TROPONIN I PNL SERPL HS: 4 NG/L (ref 0–53)
CHLORIDE BLDV-SCNC: 106 MMOL/L (ref 98–107)
CHLORIDE SERPL-SCNC: 103 MMOL/L (ref 98–107)
CO2 SERPL-SCNC: 23 MMOL/L (ref 21–32)
COLOR UR: YELLOW
CREAT SERPL-MCNC: 0.82 MG/DL (ref 0.5–1.3)
EGFRCR SERPLBLD CKD-EPI 2021: >90 ML/MIN/1.73M*2
EOSINOPHIL # BLD AUTO: 0.03 X10*3/UL (ref 0–0.7)
EOSINOPHIL NFR BLD AUTO: 0.2 %
ERYTHROCYTE [DISTWIDTH] IN BLOOD BY AUTOMATED COUNT: 23.5 % (ref 11.5–14.5)
GLUCOSE BLDV-MCNC: 130 MG/DL (ref 74–99)
GLUCOSE SERPL-MCNC: 132 MG/DL (ref 74–99)
GLUCOSE UR STRIP.AUTO-MCNC: NORMAL MG/DL
HCO3 BLDV-SCNC: 22.6 MMOL/L (ref 22–26)
HCT VFR BLD AUTO: 17.3 % (ref 41–52)
HCT VFR BLD EST: 22 % (ref 41–52)
HGB BLD-MCNC: 6.6 G/DL (ref 13.5–17.5)
HGB BLDV-MCNC: 7.2 G/DL (ref 13.5–17.5)
HGB RETIC QN: 27 PG (ref 28–38)
IMM GRANULOCYTES # BLD AUTO: 0.69 X10*3/UL (ref 0–0.7)
IMM GRANULOCYTES NFR BLD AUTO: 4.5 % (ref 0–0.9)
IMMATURE RETIC FRACTION: 29.4 %
INR PPP: 1.4 (ref 0.9–1.1)
KETONES UR STRIP.AUTO-MCNC: NEGATIVE MG/DL
LACTATE BLDV-SCNC: 1.5 MMOL/L (ref 0.4–2)
LDH SERPL L TO P-CCNC: 510 U/L (ref 84–246)
LEUKOCYTE ESTERASE UR QL STRIP.AUTO: NEGATIVE
LYMPHOCYTES # BLD AUTO: 1.83 X10*3/UL (ref 1.2–4.8)
LYMPHOCYTES NFR BLD AUTO: 12 %
MCH RBC QN AUTO: 31.7 PG (ref 26–34)
MCHC RBC AUTO-ENTMCNC: 38.2 G/DL (ref 32–36)
MCV RBC AUTO: 83 FL (ref 80–100)
MONOCYTES # BLD AUTO: 2.1 X10*3/UL (ref 0.1–1)
MONOCYTES NFR BLD AUTO: 13.8 %
NEUTROPHILS # BLD AUTO: 10.54 X10*3/UL (ref 1.2–7.7)
NEUTROPHILS NFR BLD AUTO: 69.3 %
NITRITE UR QL STRIP.AUTO: NEGATIVE
NRBC BLD-RTO: 5.3 /100 WBCS (ref 0–0)
OXYHGB MFR BLDV: 69.8 % (ref 45–75)
PCO2 BLDV: 41 MM HG (ref 41–51)
PH BLDV: 7.35 PH (ref 7.33–7.43)
PH UR STRIP.AUTO: 6 [PH]
PLATELET # BLD AUTO: 188 X10*3/UL (ref 150–450)
PO2 BLDV: 64 MM HG (ref 35–45)
POLYCHROMASIA BLD QL SMEAR: NORMAL
POTASSIUM BLDV-SCNC: 4.4 MMOL/L (ref 3.5–5.3)
POTASSIUM SERPL-SCNC: 4.1 MMOL/L (ref 3.5–5.3)
PROT SERPL-MCNC: 6.8 G/DL (ref 6.4–8.2)
PROT UR STRIP.AUTO-MCNC: NEGATIVE MG/DL
PROTHROMBIN TIME: 15.7 SECONDS (ref 9.8–12.8)
RBC # BLD AUTO: 2.08 X10*6/UL (ref 4.5–5.9)
RBC # UR STRIP.AUTO: NEGATIVE /UL
RBC MORPH BLD: NORMAL
RETICS #: 0.64 X10*6/UL (ref 0.02–0.12)
RETICS/RBC NFR AUTO: 30.5 % (ref 0.5–2)
RH FACTOR (ANTIGEN D): NORMAL
SAO2 % BLDV: 73 % (ref 45–75)
SCHISTOCYTES BLD QL SMEAR: NORMAL
SICKLE CELLS BLD QL SMEAR: NORMAL
SODIUM BLDV-SCNC: 135 MMOL/L (ref 136–145)
SODIUM SERPL-SCNC: 135 MMOL/L (ref 136–145)
SP GR UR STRIP.AUTO: 1.01
TARGETS BLD QL SMEAR: NORMAL
UROBILINOGEN UR STRIP.AUTO-MCNC: ABNORMAL MG/DL
WBC # BLD AUTO: 15.2 X10*3/UL (ref 4.4–11.3)

## 2024-06-18 PROCEDURE — 84132 ASSAY OF SERUM POTASSIUM: CPT

## 2024-06-18 PROCEDURE — 83021 HEMOGLOBIN CHROMOTOGRAPHY: CPT

## 2024-06-18 PROCEDURE — 96375 TX/PRO/DX INJ NEW DRUG ADDON: CPT

## 2024-06-18 PROCEDURE — 96376 TX/PRO/DX INJ SAME DRUG ADON: CPT

## 2024-06-18 PROCEDURE — 71046 X-RAY EXAM CHEST 2 VIEWS: CPT

## 2024-06-18 PROCEDURE — 96374 THER/PROPH/DIAG INJ IV PUSH: CPT

## 2024-06-18 PROCEDURE — 85610 PROTHROMBIN TIME: CPT

## 2024-06-18 PROCEDURE — 82330 ASSAY OF CALCIUM: CPT

## 2024-06-18 PROCEDURE — 74177 CT ABD & PELVIS W/CONTRAST: CPT

## 2024-06-18 PROCEDURE — 71260 CT THORAX DX C+: CPT | Performed by: RADIOLOGY

## 2024-06-18 PROCEDURE — 96361 HYDRATE IV INFUSION ADD-ON: CPT

## 2024-06-18 PROCEDURE — 0VJS3ZZ INSPECTION OF PENIS, PERCUTANEOUS APPROACH: ICD-10-PCS

## 2024-06-18 PROCEDURE — 2500000004 HC RX 250 GENERAL PHARMACY W/ HCPCS (ALT 636 FOR OP/ED)

## 2024-06-18 PROCEDURE — 74177 CT ABD & PELVIS W/CONTRAST: CPT | Performed by: RADIOLOGY

## 2024-06-18 PROCEDURE — 84484 ASSAY OF TROPONIN QUANT: CPT

## 2024-06-18 PROCEDURE — 81003 URINALYSIS AUTO W/O SCOPE: CPT

## 2024-06-18 PROCEDURE — 84075 ASSAY ALKALINE PHOSPHATASE: CPT

## 2024-06-18 PROCEDURE — 86901 BLOOD TYPING SEROLOGIC RH(D): CPT

## 2024-06-18 PROCEDURE — 36415 COLL VENOUS BLD VENIPUNCTURE: CPT

## 2024-06-18 PROCEDURE — 2550000001 HC RX 255 CONTRASTS: Performed by: EMERGENCY MEDICINE

## 2024-06-18 PROCEDURE — 93005 ELECTROCARDIOGRAM TRACING: CPT

## 2024-06-18 PROCEDURE — 71046 X-RAY EXAM CHEST 2 VIEWS: CPT | Performed by: RADIOLOGY

## 2024-06-18 PROCEDURE — 83615 LACTATE (LD) (LDH) ENZYME: CPT

## 2024-06-18 PROCEDURE — 85045 AUTOMATED RETICULOCYTE COUNT: CPT

## 2024-06-18 PROCEDURE — 85025 COMPLETE CBC W/AUTO DIFF WBC: CPT

## 2024-06-18 PROCEDURE — 2500000001 HC RX 250 WO HCPCS SELF ADMINISTERED DRUGS (ALT 637 FOR MEDICARE OP)

## 2024-06-18 PROCEDURE — 99223 1ST HOSP IP/OBS HIGH 75: CPT

## 2024-06-18 PROCEDURE — 86920 COMPATIBILITY TEST SPIN: CPT

## 2024-06-18 PROCEDURE — 2500000005 HC RX 250 GENERAL PHARMACY W/O HCPCS: Performed by: STUDENT IN AN ORGANIZED HEALTH CARE EDUCATION/TRAINING PROGRAM

## 2024-06-18 PROCEDURE — 1210000001 HC SEMI-PRIVATE ROOM DAILY

## 2024-06-18 PROCEDURE — 99285 EMERGENCY DEPT VISIT HI MDM: CPT | Mod: 25

## 2024-06-18 RX ORDER — LIDOCAINE HYDROCHLORIDE 10 MG/ML
30 INJECTION INFILTRATION; PERINEURAL ONCE
Status: DISCONTINUED | OUTPATIENT
Start: 2024-06-18 | End: 2024-06-25 | Stop reason: HOSPADM

## 2024-06-18 RX ORDER — LIDOCAINE HYDROCHLORIDE 10 MG/ML
10 INJECTION INFILTRATION; PERINEURAL ONCE
Status: COMPLETED | OUTPATIENT
Start: 2024-06-18 | End: 2024-06-18

## 2024-06-18 RX ORDER — AMOXICILLIN 250 MG
2 CAPSULE ORAL EVERY 12 HOURS
Status: DISCONTINUED | OUTPATIENT
Start: 2024-06-18 | End: 2024-06-25 | Stop reason: HOSPADM

## 2024-06-18 RX ORDER — HYDROMORPHONE HYDROCHLORIDE 1 MG/ML
1 INJECTION, SOLUTION INTRAMUSCULAR; INTRAVENOUS; SUBCUTANEOUS
Status: COMPLETED | OUTPATIENT
Start: 2024-06-18 | End: 2024-06-18

## 2024-06-18 RX ORDER — HYDROMORPHONE HYDROCHLORIDE 1 MG/ML
1 INJECTION, SOLUTION INTRAMUSCULAR; INTRAVENOUS; SUBCUTANEOUS ONCE
Status: COMPLETED | OUTPATIENT
Start: 2024-06-18 | End: 2024-06-18

## 2024-06-18 RX ORDER — PHENYLEPHRINE HCL IN 0.9% NACL 1 MG/10 ML
1000 SYRINGE (ML) INTRAVENOUS ONCE
Status: DISCONTINUED | OUTPATIENT
Start: 2024-06-18 | End: 2024-06-25 | Stop reason: HOSPADM

## 2024-06-18 RX ORDER — FOLIC ACID 1 MG/1
1 TABLET ORAL DAILY
Status: DISCONTINUED | OUTPATIENT
Start: 2024-06-19 | End: 2024-06-25 | Stop reason: HOSPADM

## 2024-06-18 RX ORDER — DIPHENHYDRAMINE HYDROCHLORIDE 50 MG/ML
25 INJECTION INTRAMUSCULAR; INTRAVENOUS ONCE
Status: COMPLETED | OUTPATIENT
Start: 2024-06-18 | End: 2024-06-18

## 2024-06-18 RX ORDER — POLYETHYLENE GLYCOL 3350 17 G/17G
17 POWDER, FOR SOLUTION ORAL DAILY
Status: DISCONTINUED | OUTPATIENT
Start: 2024-06-19 | End: 2024-06-25 | Stop reason: HOSPADM

## 2024-06-18 RX ORDER — ENOXAPARIN SODIUM 100 MG/ML
40 INJECTION SUBCUTANEOUS EVERY 24 HOURS
Status: DISCONTINUED | OUTPATIENT
Start: 2024-06-18 | End: 2024-06-25 | Stop reason: HOSPADM

## 2024-06-18 RX ORDER — PSEUDOEPHEDRINE HYDROCHLORIDE 60 MG/1
30 TABLET ORAL EVERY 4 HOURS PRN
Status: DISCONTINUED | OUTPATIENT
Start: 2024-06-18 | End: 2024-06-19

## 2024-06-18 RX ORDER — PANTOPRAZOLE SODIUM 40 MG/1
40 TABLET, DELAYED RELEASE ORAL
Status: DISCONTINUED | OUTPATIENT
Start: 2024-06-19 | End: 2024-06-25 | Stop reason: HOSPADM

## 2024-06-18 RX ORDER — ONDANSETRON 4 MG/1
4 TABLET, FILM COATED ORAL EVERY 8 HOURS PRN
Status: DISCONTINUED | OUTPATIENT
Start: 2024-06-18 | End: 2024-06-25 | Stop reason: HOSPADM

## 2024-06-18 RX ORDER — DIPHENHYDRAMINE HCL 25 MG
25 CAPSULE ORAL EVERY 6 HOURS PRN
Status: DISCONTINUED | OUTPATIENT
Start: 2024-06-18 | End: 2024-06-25 | Stop reason: HOSPADM

## 2024-06-18 RX ORDER — KETOROLAC TROMETHAMINE 30 MG/ML
30 INJECTION, SOLUTION INTRAMUSCULAR; INTRAVENOUS EVERY 6 HOURS SCHEDULED
Status: COMPLETED | OUTPATIENT
Start: 2024-06-19 | End: 2024-06-21

## 2024-06-18 ASSESSMENT — PAIN SCALES - GENERAL
PAINLEVEL_OUTOF10: 9
PAINLEVEL_OUTOF10: 10 - WORST POSSIBLE PAIN
PAINLEVEL_OUTOF10: 8
PAINLEVEL_OUTOF10: 10 - WORST POSSIBLE PAIN

## 2024-06-18 ASSESSMENT — COLUMBIA-SUICIDE SEVERITY RATING SCALE - C-SSRS
2. HAVE YOU ACTUALLY HAD ANY THOUGHTS OF KILLING YOURSELF?: NO
6. HAVE YOU EVER DONE ANYTHING, STARTED TO DO ANYTHING, OR PREPARED TO DO ANYTHING TO END YOUR LIFE?: NO
1. IN THE PAST MONTH, HAVE YOU WISHED YOU WERE DEAD OR WISHED YOU COULD GO TO SLEEP AND NOT WAKE UP?: YES

## 2024-06-18 ASSESSMENT — PAIN DESCRIPTION - LOCATION
LOCATION: GROIN
LOCATION: PENIS

## 2024-06-18 ASSESSMENT — PAIN - FUNCTIONAL ASSESSMENT: PAIN_FUNCTIONAL_ASSESSMENT: 0-10

## 2024-06-18 ASSESSMENT — LIFESTYLE VARIABLES
TOTAL SCORE: 0
HAVE PEOPLE ANNOYED YOU BY CRITICIZING YOUR DRINKING: NO
EVER FELT BAD OR GUILTY ABOUT YOUR DRINKING: NO
EVER HAD A DRINK FIRST THING IN THE MORNING TO STEADY YOUR NERVES TO GET RID OF A HANGOVER: NO
HAVE YOU EVER FELT YOU SHOULD CUT DOWN ON YOUR DRINKING: NO

## 2024-06-18 ASSESSMENT — PAIN DESCRIPTION - DESCRIPTORS: DESCRIPTORS: ACHING

## 2024-06-18 ASSESSMENT — PAIN DESCRIPTION - PAIN TYPE: TYPE: OTHER (COMMENT)
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
## 2024-06-19 LAB
ALBUMIN SERPL BCP-MCNC: 4.2 G/DL (ref 3.4–5)
ALP SERPL-CCNC: 100 U/L (ref 33–120)
ALT SERPL W P-5'-P-CCNC: 21 U/L (ref 10–52)
AMPHETAMINES UR QL SCN: ABNORMAL
ANION GAP SERPL CALC-SCNC: 11 MMOL/L (ref 10–20)
AST SERPL W P-5'-P-CCNC: 58 U/L (ref 9–39)
BARBITURATES UR QL SCN: ABNORMAL
BASOPHILS # BLD MANUAL: 0.21 X10*3/UL (ref 0–0.1)
BASOPHILS NFR BLD MANUAL: 1.7 %
BENZODIAZ UR QL SCN: ABNORMAL
BILIRUB SERPL-MCNC: 8.6 MG/DL (ref 0–1.2)
BLOOD EXPIRATION DATE: NORMAL
BUN SERPL-MCNC: 7 MG/DL (ref 6–23)
BZE UR QL SCN: ABNORMAL
CALCIUM SERPL-MCNC: 8.9 MG/DL (ref 8.6–10.6)
CANNABINOIDS UR QL SCN: ABNORMAL
CHLORIDE SERPL-SCNC: 106 MMOL/L (ref 98–107)
CO2 SERPL-SCNC: 26 MMOL/L (ref 21–32)
CREAT SERPL-MCNC: 0.68 MG/DL (ref 0.5–1.3)
DACRYOCYTES BLD QL SMEAR: ABNORMAL
DISPENSE STATUS: NORMAL
EGFRCR SERPLBLD CKD-EPI 2021: >90 ML/MIN/1.73M*2
EOSINOPHIL # BLD MANUAL: 0 X10*3/UL (ref 0–0.7)
EOSINOPHIL NFR BLD MANUAL: 0 %
ERYTHROCYTE [DISTWIDTH] IN BLOOD BY AUTOMATED COUNT: 23.2 % (ref 11.5–14.5)
FENTANYL+NORFENTANYL UR QL SCN: ABNORMAL
FLUAV RNA RESP QL NAA+PROBE: NOT DETECTED
FLUBV RNA RESP QL NAA+PROBE: NOT DETECTED
GLUCOSE SERPL-MCNC: 91 MG/DL (ref 74–99)
HCT VFR BLD AUTO: 20.4 % (ref 41–52)
HEMOGLOBIN A2: 3.4 % (ref 2–3.5)
HEMOGLOBIN F: 2.1 % (ref 0–2)
HEMOGLOBIN IDENTIFICATION INTERPRETATION: ABNORMAL
HEMOGLOBIN S: 94.5 %
HGB BLD-MCNC: 7.7 G/DL (ref 13.5–17.5)
HGB RETIC QN: 26 PG (ref 28–38)
HOLD SPECIMEN: NORMAL
HOLD SPECIMEN: NORMAL
IMM GRANULOCYTES # BLD AUTO: 0.28 X10*3/UL (ref 0–0.7)
IMM GRANULOCYTES NFR BLD AUTO: 2.3 % (ref 0–0.9)
IMMATURE RETIC FRACTION: 25.2 %
LDH SERPL L TO P-CCNC: 662 U/L (ref 84–246)
LYMPHOCYTES # BLD MANUAL: 4.55 X10*3/UL (ref 1.2–4.8)
LYMPHOCYTES NFR BLD MANUAL: 37.3 %
MCH RBC QN AUTO: 31.7 PG (ref 26–34)
MCHC RBC AUTO-ENTMCNC: 37.7 G/DL (ref 32–36)
MCV RBC AUTO: 84 FL (ref 80–100)
METHADONE UR QL SCN: ABNORMAL
MONOCYTES # BLD MANUAL: 0.83 X10*3/UL (ref 0.1–1)
MONOCYTES NFR BLD MANUAL: 6.8 %
NEUTS SEG # BLD MANUAL: 6.41 X10*3/UL (ref 1.2–7)
NEUTS SEG NFR BLD MANUAL: 52.5 %
NRBC BLD-RTO: 7 /100 WBCS (ref 0–0)
OPIATES UR QL SCN: ABNORMAL
OVALOCYTES BLD QL SMEAR: ABNORMAL
OXYCODONE+OXYMORPHONE UR QL SCN: ABNORMAL
PATH REVIEW-HGB IDENTIFICATION: ABNORMAL
PCP UR QL SCN: ABNORMAL
PLATELET # BLD AUTO: 221 X10*3/UL (ref 150–450)
POLYCHROMASIA BLD QL SMEAR: ABNORMAL
POTASSIUM SERPL-SCNC: 3.8 MMOL/L (ref 3.5–5.3)
PRODUCT BLOOD TYPE: 5100
PRODUCT CODE: NORMAL
PROT SERPL-MCNC: 6.3 G/DL (ref 6.4–8.2)
RBC # BLD AUTO: 2.43 X10*6/UL (ref 4.5–5.9)
RBC MORPH BLD: ABNORMAL
RETICS #: 0.81 X10*6/UL (ref 0.02–0.12)
RETICS/RBC NFR AUTO: 33.4 % (ref 0.5–2)
SARS-COV-2 RNA RESP QL NAA+PROBE: NOT DETECTED
SCHISTOCYTES BLD QL SMEAR: ABNORMAL
SICKLE CELLS BLD QL SMEAR: ABNORMAL
SODIUM SERPL-SCNC: 139 MMOL/L (ref 136–145)
TOTAL CELLS COUNTED BLD: 118
UNIT ABO: NORMAL
UNIT NUMBER: NORMAL
UNIT RH: NORMAL
UNIT VOLUME: 350
VARIANT LYMPHS # BLD MANUAL: 0.21 X10*3/UL (ref 0–0.5)
VARIANT LYMPHS NFR BLD: 1.7 %
WBC # BLD AUTO: 12.2 X10*3/UL (ref 4.4–11.3)
XM INTEP: NORMAL

## 2024-06-19 PROCEDURE — 1210000001 HC SEMI-PRIVATE ROOM DAILY

## 2024-06-19 PROCEDURE — 99223 1ST HOSP IP/OBS HIGH 75: CPT | Performed by: INTERNAL MEDICINE

## 2024-06-19 PROCEDURE — 83615 LACTATE (LD) (LDH) ENZYME: CPT

## 2024-06-19 PROCEDURE — 36430 TRANSFUSION BLD/BLD COMPNT: CPT

## 2024-06-19 PROCEDURE — 85045 AUTOMATED RETICULOCYTE COUNT: CPT

## 2024-06-19 PROCEDURE — 99233 SBSQ HOSP IP/OBS HIGH 50: CPT | Performed by: INTERNAL MEDICINE

## 2024-06-19 PROCEDURE — P9040 RBC LEUKOREDUCED IRRADIATED: HCPCS

## 2024-06-19 PROCEDURE — 2500000001 HC RX 250 WO HCPCS SELF ADMINISTERED DRUGS (ALT 637 FOR MEDICARE OP)

## 2024-06-19 PROCEDURE — 87636 SARSCOV2 & INF A&B AMP PRB: CPT

## 2024-06-19 PROCEDURE — 2500000004 HC RX 250 GENERAL PHARMACY W/ HCPCS (ALT 636 FOR OP/ED)

## 2024-06-19 PROCEDURE — 85027 COMPLETE CBC AUTOMATED: CPT

## 2024-06-19 PROCEDURE — 84075 ASSAY ALKALINE PHOSPHATASE: CPT

## 2024-06-19 PROCEDURE — 80307 DRUG TEST PRSMV CHEM ANLYZR: CPT | Performed by: EMERGENCY MEDICINE

## 2024-06-19 PROCEDURE — 85007 BL SMEAR W/DIFF WBC COUNT: CPT

## 2024-06-19 RX ORDER — PSEUDOEPHEDRINE HCL 30 MG
30 TABLET ORAL EVERY 4 HOURS PRN
Status: DISCONTINUED | OUTPATIENT
Start: 2024-06-19 | End: 2024-06-21

## 2024-06-19 ASSESSMENT — PAIN - FUNCTIONAL ASSESSMENT
PAIN_FUNCTIONAL_ASSESSMENT: 0-10
PAIN_FUNCTIONAL_ASSESSMENT: 0-10

## 2024-06-19 ASSESSMENT — PAIN SCALES - GENERAL
PAINLEVEL_OUTOF10: 8
PAINLEVEL_OUTOF10: 8
PAINLEVEL_OUTOF10: 7
PAINLEVEL_OUTOF10: 10 - WORST POSSIBLE PAIN
PAINLEVEL_OUTOF10: 3
PAINLEVEL_OUTOF10: 7
PAINLEVEL_OUTOF10: 10 - WORST POSSIBLE PAIN
PAINLEVEL_OUTOF10: 10 - WORST POSSIBLE PAIN
PAINLEVEL_OUTOF10: 7
PAINLEVEL_OUTOF10: 8

## 2024-06-19 ASSESSMENT — PAIN DESCRIPTION - LOCATION
LOCATION: GROIN
LOCATION: GENERALIZED
LOCATION: CHEST

## 2024-06-19 NOTE — PROGRESS NOTES
"Joellen Narayan is a 23 y.o. male on day 1 of admission presenting with Sickle cell crisis (Multi).    Subjective   Seen this AM in the ED. Patient awaiting a bed in [facility]. He reports chest pain and R flank pain consistent with his sickle cell pain. Says IV pain meds are helping. Reports that priapism has improved this morning. He remains on 2L NC. Denies any fevers/chills or SOB.    Objective     Physical Exam  Vitals reviewed.   Constitutional:       Appearance: Normal appearance.   HENT:      Head: Normocephalic and atraumatic.      Nose: Nose normal.      Mouth/Throat:      Mouth: Mucous membranes are moist.      Pharynx: Oropharynx is clear.   Eyes:      Extraocular Movements: Extraocular movements intact.      Pupils: Pupils are equal, round, and reactive to light.   Cardiovascular:      Rate and Rhythm: Normal rate and regular rhythm.      Pulses: Normal pulses.      Heart sounds: Normal heart sounds.   Pulmonary:      Effort: Pulmonary effort is normal.      Breath sounds: Normal breath sounds.      Comments: 2L NC  Abdominal:      General: Bowel sounds are normal.      Palpations: Abdomen is soft.   Musculoskeletal:         General: Normal range of motion.   Skin:     General: Skin is warm.   Neurological:      General: No focal deficit present.      Mental Status: He is alert and oriented to person, place, and time. Mental status is at baseline.   Psychiatric:         Mood and Affect: Mood normal.         Behavior: Behavior normal.       Last Recorded Vitals  Blood pressure 108/67, pulse 57, temperature 36.6 °C (97.9 °F), temperature source Oral, resp. rate 20, height 1.88 m (6' 2\"), weight 68 kg (150 lb), SpO2 97%.  Intake/Output last 3 Shifts:  I/O last 3 completed shifts:  In: 393.8 (5.8 mL/kg) [Blood:393.8]  Out: - (0 mL/kg)   Weight: 68 kg     Results for orders placed or performed during the hospital encounter of 06/18/24 (from the past 24 hour(s))   LDH, Lactate dehydrogenase   Result Value Ref " Range     (H) 84 - 246 U/L   Comprehensive metabolic panel   Result Value Ref Range    Glucose 132 (H) 74 - 99 mg/dL    Sodium 135 (L) 136 - 145 mmol/L    Potassium 4.1 3.5 - 5.3 mmol/L    Chloride 103 98 - 107 mmol/L    Bicarbonate 23 21 - 32 mmol/L    Anion Gap 13 10 - 20 mmol/L    Urea Nitrogen 9 6 - 23 mg/dL    Creatinine 0.82 0.50 - 1.30 mg/dL    eGFR >90 >60 mL/min/1.73m*2    Calcium 9.2 8.6 - 10.6 mg/dL    Albumin 4.6 3.4 - 5.0 g/dL    Alkaline Phosphatase 109 33 - 120 U/L    Total Protein 6.8 6.4 - 8.2 g/dL    AST 44 (H) 9 - 39 U/L    Bilirubin, Total 9.8 (H) 0.0 - 1.2 mg/dL    ALT 18 10 - 52 U/L   Troponin I, High Sensitivity   Result Value Ref Range    Troponin I, High Sensitivity 4 0 - 53 ng/L   Blood Gas Venous Full Panel Unsolicited   Result Value Ref Range    POCT pH, Venous 7.35 7.33 - 7.43 pH    POCT pCO2, Venous 41 41 - 51 mm Hg    POCT pO2, Venous 64 (H) 35 - 45 mm Hg    POCT SO2, Venous 73 45 - 75 %    POCT Oxy Hemoglobin, Venous 69.8 45.0 - 75.0 %    POCT Hematocrit Calculated, Venous 22.0 (L) 41.0 - 52.0 %    POCT Sodium, Venous 135 (L) 136 - 145 mmol/L    POCT Potassium, Venous 4.4 3.5 - 5.3 mmol/L    POCT Chloride, Venous 106 98 - 107 mmol/L    POCT Ionized [place], Venous 1.24 1.10 - 1.33 mmol/L    POCT Glucose, Venous 130 (H) 74 - 99 mg/dL    POCT Lactate, Venous 1.5 0.4 - 2.0 mmol/L    POCT Base Excess, Venous -2.8 (L) -2.0 - 3.0 mmol/L    POCT HCO3 Calculated, Venous 22.6 22.0 - 26.0 mmol/L    POCT Hemoglobin, Venous 7.2 (L) 13.5 - 17.5 g/dL    POCT Anion Gap, Venous 11.0 10.0 - 25.0 mmol/L    Patient Temperature 37.0 degrees Celsius   Coagulation Screen   Result Value Ref Range    Protime 15.7 (H) 9.8 - 12.8 seconds    INR 1.4 (H) 0.9 - 1.1    aPTT 26 (L) 27 - 38 seconds   Type and Screen   Result Value Ref Range    ABO TYPE B     Rh TYPE POS     ANTIBODY SCREEN NEG    Urinalysis with Reflex Microscopic   Result Value Ref Range    Color, Urine Yellow Light-Yellow, Yellow,  Dark-Yellow    Appearance, Urine Clear Clear    Specific Gravity, Urine 1.012 1.005 - 1.035    pH, Urine 6.0 5.0, 5.5, 6.0, 6.5, 7.0, 7.5, 8.0    Protein, Urine NEGATIVE NEGATIVE, 10 (TRACE), 20 (TRACE) mg/dL    Glucose, Urine Normal Normal mg/dL    Blood, Urine NEGATIVE NEGATIVE    Ketones, Urine NEGATIVE NEGATIVE mg/dL    Bilirubin, Urine NEGATIVE NEGATIVE    Urobilinogen, Urine 2 (1+) (A) Normal mg/dL    Nitrite, Urine NEGATIVE NEGATIVE    Leukocyte Esterase, Urine NEGATIVE NEGATIVE   CBC and Auto Differential   Result Value Ref Range    WBC 15.2 (H) 4.4 - 11.3 x10*3/uL    nRBC 5.3 (H) 0.0 - 0.0 /100 WBCs    RBC 2.08 (L) 4.50 - 5.90 x10*6/uL    Hemoglobin 6.6 (L) 13.5 - 17.5 g/dL    Hematocrit 17.3 (L) 41.0 - 52.0 %    MCV 83 80 - 100 fL    MCH 31.7 26.0 - 34.0 pg    MCHC 38.2 (H) 32.0 - 36.0 g/dL    RDW 23.5 (H) 11.5 - 14.5 %    Platelets 188 150 - 450 x10*3/uL    Neutrophils % 69.3 40.0 - 80.0 %    Immature Granulocytes %, Automated 4.5 (H) 0.0 - 0.9 %    Lymphocytes % 12.0 13.0 - 44.0 %    Monocytes % 13.8 2.0 - 10.0 %    Eosinophils % 0.2 0.0 - 6.0 %    Basophils % 0.2 0.0 - 2.0 %    Neutrophils Absolute 10.54 (H) 1.20 - 7.70 x10*3/uL    Immature Granulocytes Absolute, Automated 0.69 0.00 - 0.70 x10*3/uL    Lymphocytes Absolute 1.83 1.20 - 4.80 x10*3/uL    Monocytes Absolute 2.10 (H) 0.10 - 1.00 x10*3/uL    Eosinophils Absolute 0.03 0.00 - 0.70 x10*3/uL    Basophils Absolute 0.03 0.00 - 0.10 x10*3/uL   Reticulocytes   Result Value Ref Range    Retic % 30.5 (H) 0.5 - 2.0 %    Retic Absolute 0.635 (H) 0.022 - 0.118 x10*6/uL    Reticulocyte Hemoglobin 27 (L) 28 - 38 pg    Immature Retic fraction 29.4 (H) <=16.0 %   Hemoglobin Identification with Path Review   Result Value Ref Range    Hemoglobin F      Hemoglobin S 94.5 (H) <=0.0 %    Hemoglobin A2      Hemoglobin Identification Interpretation      Pathologist Review-Hemoglobin Identification     Morphology   Result Value Ref Range    RBC Morphology See Below      Polychromasia Mild     RBC Fragments Few     Sickle Cells Many     Target Cells Many    Prepare RBC: 1 Units   Result Value Ref Range    PRODUCT CODE [id number]     Unit Number [id number]     Unit ABO O     Unit RH POS     XM INTEP COMP     Dispense Status TR     Blood Expiration Date July 12, 2024 23:59 EDT     PRODUCT BLOOD TYPE 5100     UNIT VOLUME 350    Drug Screen, Urine   Result Value Ref Range    Amphetamine Screen, Urine Presumptive Negative Presumptive Negative    Barbiturate Screen, Urine Presumptive Negative Presumptive Negative    Benzodiazepines Screen, Urine Presumptive Negative Presumptive Negative    Cannabinoid Screen, Urine Presumptive Positive (A) Presumptive Negative    Cocaine Metabolite Screen, Urine Presumptive Negative Presumptive Negative    Fentanyl Screen, Urine Presumptive Negative Presumptive Negative    Opiate Screen, Urine Presumptive Positive (A) Presumptive Negative    Oxycodone Screen, Urine Presumptive Negative Presumptive Negative    PCP Screen, Urine Presumptive Negative Presumptive Negative    Methadone Screen, Urine Presumptive Negative Presumptive Negative   Sars-CoV-2 PCR   Result Value Ref Range    Coronavirus 2019, PCR Not Detected Not Detected   Influenza A, and B PCR   Result Value Ref Range    Flu A Result Not Detected Not Detected    Flu B Result Not Detected Not Detected   CBC and Auto Differential   Result Value Ref Range    WBC 12.2 (H) 4.4 - 11.3 x10*3/uL    nRBC 7.0 (H) 0.0 - 0.0 /100 WBCs    RBC 2.43 (L) 4.50 - 5.90 x10*6/uL    Hemoglobin 7.7 (L) 13.5 - 17.5 g/dL    Hematocrit 20.4 (L) 41.0 - 52.0 %    MCV 84 80 - 100 fL    MCH 31.7 26.0 - 34.0 pg    MCHC 37.7 (H) 32.0 - 36.0 g/dL    RDW 23.2 (H) 11.5 - 14.5 %    Platelets 221 150 - 450 x10*3/uL    Immature Granulocytes %, Automated 2.3 (H) 0.0 - 0.9 %    Immature Granulocytes Absolute, Automated 0.28 0.00 - 0.70 x10*3/uL   Comprehensive Metabolic Panel   Result Value Ref Range    Glucose 91 74 - 99 mg/dL     Sodium 139 136 - 145 mmol/L    Potassium 3.8 3.5 - 5.3 mmol/L    Chloride 106 98 - 107 mmol/L    Bicarbonate 26 21 - 32 mmol/L    Anion Gap 11 10 - 20 mmol/L    Urea Nitrogen 7 6 - 23 mg/dL    Creatinine 0.68 0.50 - 1.30 mg/dL    eGFR >90 >60 mL/min/1.73m*2    Calcium 8.9 8.6 - 10.6 mg/dL    Albumin 4.2 3.4 - 5.0 g/dL    Alkaline Phosphatase 100 33 - 120 U/L    Total Protein 6.3 (L) 6.4 - 8.2 g/dL    AST 58 (H) 9 - 39 U/L    Bilirubin, Total 8.6 (H) 0.0 - 1.2 mg/dL    ALT 21 10 - 52 U/L   Reticulocytes   Result Value Ref Range    Retic % 33.4 (H) 0.5 - 2.0 %    Retic Absolute 0.813 (H) 0.022 - 0.118 x10*6/uL    Reticulocyte Hemoglobin 26 (L) 28 - 38 pg    Immature Retic fraction 25.2 (H) <=16.0 %   Lactate Dehydrogenase   Result Value Ref Range     (H) 84 - 246 U/L   Red Top   Result Value Ref Range    Extra Tube Hold for add-ons.    SST TOP   Result Value Ref Range    Extra Tube Hold for add-ons.    Manual Differential   Result Value Ref Range    Neutrophils %, Manual 52.5 40.0 - 80.0 %    Lymphocytes %, Manual 37.3 13.0 - 44.0 %    Monocytes %, Manual 6.8 2.0 - 10.0 %    Eosinophils %, Manual 0.0 0.0 - 6.0 %    Basophils %, Manual 1.7 0.0 - 2.0 %    Atypical Lymphocytes %, Manual 1.7 0.0 - 2.0 %    Seg Neutrophils Absolute, Manual 6.41 1.20 - 7.00 x10*3/uL    Lymphocytes Absolute, Manual 4.55 1.20 - 4.80 x10*3/uL    Monocytes Absolute, Manual 0.83 0.10 - 1.00 x10*3/uL    Eosinophils Absolute, Manual 0.00 0.00 - 0.70 x10*3/uL    Basophils Absolute, Manual 0.21 (H) 0.00 - 0.10 x10*3/uL    Atypical Lymphs Absolute, Manual 0.21 0.00 - 0.50 x10*3/uL    Total Cells Counted 118     RBC Morphology See Below     Polychromasia Mild     RBC Fragments Few     Sickle Cells Many     Ovalocytes Few     Teardrop Cells Few        Assessment/Plan   Principal Problem:    Sickle cell crisis (Multi)    Joellen Narayan is a 23 y.o. 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 Of note pt was hypoxic on admission, placed on 2L supp O2. Urology consulted in ED (6/18) for priapism, corporal aspiration was attempted in the ED without return of blood. Priapism resolved with O2 and IVF. ACS consulted in ED (6/18) for poss pneumoperitoneum seen on CXR, CT CAP showed no evidence of pneumoperitoneum and ACS rec no surgical interventions. Hematology consulted in ED (6/18) rec 1u pRBC for Hgb 6.6. Admitted for further management. Started on IV dilaudid for pain management. DC pending improvement in pain.     # Hgb SS with severe pain  - OARRS reviewed, no aberrancies  - No current care path  - Hgb baseline ~8.5, Hgb 6.6 (6/18)--> s/p 1unit PRBC--> Hgb 7.7 (6/19)  - Tbili baseline fluctuates ~5-13, Tbili 9.8 (6/18)--> 8.6 (6/19)  - LDH baseline fluctuates but ~500,  (6/18)--> 662 (6/19)  - Leukocytosis noted on admission (6/18 WBC 15.2--> improved to 12.2 as of 6/19), likely reactive as pt afebrile with no s/sx of infectious process  - Pt with CP and hypoxia with elevated Hgb S, however these are all consistent with pt's baseline presentation of sickle cell crisis. In the absence of imaging findings c/f infection or ACS, no SOB/wheezing, and no fevers-- low c/f ACS at this time, however will monitor closely  - UA (6/18) neg  - Troponin (6/18) 4  - EKG (6/18) NSR  - Hematology consulted in ED (6/18) rec 1u pRBC and obtaining Hgb S  - On admit started IV dilaudid 3mg q2hrs PRN severe pain (6/18- current)  - Started IV Toradol 30mg q6hrs x3 days (6/18- plan stop 6/20) with Protonix for PPI prophy  - Continue folic acid 1mg daily  - Hgb S (6/18): 94.5%  -  Utox (6/18): +opiates, +cannabinoid  - PO Zofran PRN for opioid-induced nausea, PO Benadryl PRN for opioid-induced pruritus, Bowel regimen for opioid-induced constipation with DocuSenna 2tabs BID and Miralax daily      # Nodular lymphocyte predominant Hodgkins lymphoma (NLPHL)   - Primary Oncologist: Dr. Stoll--update in AM of admission  - Enlarged lymph nodes noted 4/1/22  - RUQ US (11/14/22) with mildly enlarged LNs in the region of the kavin hepatis  - MRI liver (11/16/22) showed re-demonstration of bulky retroperitoneal lymphadenopathy and kavin hepatic lymphadenopathy    - (11/18/22) lymph node biopsy showed atypical lymphoid infiltrate. Reviewed by Hemepath board, insufficient for lymphoma diagnosis  - PET/CT (12/6/22) showing retroperitoneal lymphadenopathy  - Followed up with Dr. Stoll (12/16/22) with plan for surg/onc consult for large tissue bx but patient missed apt and was lost to follow up  - Requested new apt with Dr. Ronnie Marte 6/19/23, patient was not seen and lost to follow up  - CT a/p (11/28/23) increased size of retroperitoneal lymph nodes reflecting extramedullary hematopoiesis I/s/o sickle cell vs lymphoma  - Paraaortic LN bx via IR (11/30/23) consistent with NLPHL. Flow: no clonal B cell or T cell population identified, lymphocyte 95%, CD3+CD4+ 68%, CD3+CD8+ 7%, CD19+ 24%  - Elevated LDH/bili partially from sickle cell disease   - Chemotherapy (R-CHOP) was discussed with primary oncologist Dr. Stoll, and decision was made to simplify his chemotherapy to Rituxan and prednisone q3 weeks mainly due to frequent sickle cell crisis  - Current chemo regimen: rituxan and prednisone q3 weeks (C1 1/18/24, C2 2/8/24, Missed C3 d/t sickle cell pain crisis, C4 4/4/24, C5 5/16/24, C6 6/7/24)  - Per pt no longer taking Acyclovir 400mg BID prophy      # Priapism  - 6/18 pt presented to the ED with Priapism   - Corporal aspiration was attempted in the ED at the 2 o'clock position near the base without  return of blood  - Urology consulted (6/18) rec no acute interventions necessary as there was improvement with conservative management, urology will continue to follow  - Start 30mg Sudafed q4h PRN    # Initial c/f pneumoperitoneum-- ruled out  - CXR (6/18) no acute cardiopulmonary process, [place]-shaped lucency under the left hemidiaphragm new from the previous examination. No lucency under the right hemidiaphragm seen. This favors air-fluid level with a somewhat atypical appearance of the gastric bubble. Correlate with any abdominal symptomatology to the need for dedicated abdominal radiographs.   - CT C/A/P (6/18) No evidence of pneumoperitoneum, Moderate distention of the stomach and colon which appears similar to 02/16/2024. Moderate distention of the urinary bladder and mild prominence of the ureters. Please correlate for urinary retention. Skin thickening of the visualized penis. Correlation with physical examination is suggested. No acute cardiopulmonary process. Debris within the esophageal lumen which places the patient at  increased risk of aspiration.  - ACS consulted in ED (6/18) for c/f free air under the diaphragm, rec CT A/P reviewed which showed no free air noted. Stomach appears grossly distended, which likely led to the perception of free air on CXR. Of note, the patient mentioned he had a meal ~1 hr prior to examination. No surgical interventions indicated.      # Hx of nocturnal oxygen dependence and hypoxia on room air  - Historically improves with oxygen supplementation  - Pt hypoxic on admission (SpO2 86% on RA), placed on 2L supp O2    - CXR and CT CAP (6/18) showed no signs of infectious process and pt denies SOB  - Has not had home oxygen for 2-3 years   - Pt did not qualify for home O2 per walking pulse ox performed during previous hospital admission on 2/26/24  - Missed pulm apt on 2/21/24 due to being admitted inpt  - Wean as able, encourage incentive spirometry     DVT prophy:  Lovenox subcutaneous, SCDs, encourage ambulation    DISPO:  - Full code, confirmed on admit  - DC pending improvement in pain  - SUSIE Camacholencho Cintronnt (Parent) [phone]  - FUV PET/CT 7/15, Dr. Stoll 7/25, Sickle cell requested    I spent >60 minutes in the professional and overall care of this patient    Assessment and plan discussed with attending physician Dr. James Murguia, APRN-CNP
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
## 2024-06-19 NOTE — PROGRESS NOTES
Pharmacy Medication History Review    Joellen Narayan is a 23 y.o. male admitted for Sickle cell crisis (Multi). Pharmacy reviewed the patient's ojuhv-eh-rwaixurer medications and allergies for accuracy.    The list below reflects the updated PTA list. Comments regarding how patient may be taking medications differently can be found in the Admit Orders Activity  Prior to Admission Medications   Prescriptions Last Dose Informant   folic acid (Folvite) 1 mg tablet More than a month Self   Sig: Take 1 tablet (1 mg) by mouth once daily.   Patient not taking: Reported on 6/19/2024   glutamine, sickle cell, (Endari) 5 gram powder in packet More than a month Self   Sig: Take 15 g by mouth 2 times a day.   Patient not taking: Reported on 6/19/2024   oxyCODONE (Roxicodone) 15 mg immediate release tablet Not Taking Self   Sig: Take 1 tablet (15 mg) by mouth every 6 hours if needed (Severe sickle cell pain).   Patient not taking: Reported on 6/19/2024   polyethylene glycol (Glycolax, Miralax) 17 gram packet 6/19/2024 at AM Self   Sig: Take 17 g by mouth once daily.      Facility-Administered Medications: None        The list below reflects the updated allergy list. Please review each documented allergy for additional clarification and justification.  Allergies  Reviewed by Breanna Phoenix on 6/19/2024        Severity Reactions Comments    Cefepime Medium Hallucinations     Amoxicillin Low Hives     Ceftriaxone Low Hives, Rash             Patient accepts M2B at discharge. Pharmacy has been updated to [facility].    Sources used to complete the med history include out patient fill history, OARRS, and patient interview (patient was not the greatest historian, during the interview patient was more interested in sleeping than talking. I had to wake him up a few times).    Below are additional concerns with the patient's PTA list.  ~Patient stated that he does have some Folic Acid left, he just has not been taking  them.    ~Patient has not been taking his Endari, he ran out and needs a new prescription.     BreAnna Phoenix, CPhT  Transitions of Care   Meds Ambulatory [facility]  Please reach out via Secure Chat for questions, or if no response call [phone] or [facility]

## 2024-06-19 NOTE — SIGNIFICANT EVENT
Patient seen again in the ED this morning.  Patient reported cessation of priapism and on physical exam confirmed lack of erection.  Pain is fully bendable and soft.  Spoke to patient regarding outpatient follow-up with urologist.  Email was sent to schedule follow-up.    -Okay to discharge from urologic perspective  -Follow-up to be scheduled shortly  -Please page with any questions or concerns, or if erection recurs    Kristina Fletcher MD   [facility]  Adult Urology Pager: [phone]  Pediatric Urology Pager: [phone]

## 2024-06-19 NOTE — SIGNIFICANT EVENT
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    He was noted to have O2 sat of 86% on room air and was placed on 2L NC. CXR and CT chest with contrast do not show obvious consolidations. Urology was consulted to decompress priapism.     Hematology was called given patient's Hgb is 6.6 which is lower than baseline (~9) and an absolute retic count of 0.635 (much higher than baseline). LDH of 510 shows no excess cell turnover from baseline (-600 in the past month).    Generally, Hb<7 is not an indication to give RBCs for a patient with HbSS. However in this situation, it is okay for transfusion given the dilutional effect in the bloodstream which will help with the priapism, and his Hgb has not been this low in about 1 year.    Recommendations:  No urgent indication for RBC exchange at this time.  Please obtain baseline hemoglobin electrophoresis.  Okay to give 1 unit of RBC given the acute event.    Full hematology consult note to follow tomorrow.    Discussed by phone with Dr. Joshi.    Joel Wise MD  Medical Oncology Fellow  6/18/2024

## 2024-06-20 LAB
ALBUMIN SERPL BCP-MCNC: 4.1 G/DL (ref 3.4–5)
ALP SERPL-CCNC: 89 U/L (ref 33–120)
ALT SERPL W P-5'-P-CCNC: 34 U/L (ref 10–52)
ANION GAP SERPL CALC-SCNC: 12 MMOL/L (ref 10–20)
AST SERPL W P-5'-P-CCNC: 72 U/L (ref 9–39)
BASOPHILS # BLD AUTO: 0.04 X10*3/UL (ref 0–0.1)
BASOPHILS NFR BLD AUTO: 0.3 %
BILIRUB SERPL-MCNC: 10.1 MG/DL (ref 0–1.2)
BUN SERPL-MCNC: 9 MG/DL (ref 6–23)
CALCIUM SERPL-MCNC: 8.9 MG/DL (ref 8.6–10.6)
CHLORIDE SERPL-SCNC: 104 MMOL/L (ref 98–107)
CO2 SERPL-SCNC: 25 MMOL/L (ref 21–32)
CREAT SERPL-MCNC: 0.61 MG/DL (ref 0.5–1.3)
EGFRCR SERPLBLD CKD-EPI 2021: >90 ML/MIN/1.73M*2
EOSINOPHIL # BLD AUTO: 0.14 X10*3/UL (ref 0–0.7)
EOSINOPHIL NFR BLD AUTO: 0.9 %
ERYTHROCYTE [DISTWIDTH] IN BLOOD BY AUTOMATED COUNT: 24 % (ref 11.5–14.5)
GLUCOSE SERPL-MCNC: 85 MG/DL (ref 74–99)
HCT VFR BLD AUTO: 18.9 % (ref 41–52)
HGB BLD-MCNC: 7.3 G/DL (ref 13.5–17.5)
HGB RETIC QN: 30 PG (ref 28–38)
IMM GRANULOCYTES # BLD AUTO: 0.13 X10*3/UL (ref 0–0.7)
IMM GRANULOCYTES NFR BLD AUTO: 0.9 % (ref 0–0.9)
IMMATURE RETIC FRACTION: 30.1 %
LDH SERPL L TO P-CCNC: 647 U/L (ref 84–246)
LYMPHOCYTES # BLD AUTO: 2.59 X10*3/UL (ref 1.2–4.8)
LYMPHOCYTES NFR BLD AUTO: 17.2 %
MAGNESIUM SERPL-MCNC: 2.25 MG/DL (ref 1.6–2.4)
MCH RBC QN AUTO: 32.2 PG (ref 26–34)
MCHC RBC AUTO-ENTMCNC: 38.6 G/DL (ref 32–36)
MCV RBC AUTO: 83 FL (ref 80–100)
MONOCYTES # BLD AUTO: 1.42 X10*3/UL (ref 0.1–1)
MONOCYTES NFR BLD AUTO: 9.4 %
NEUTROPHILS # BLD AUTO: 10.77 X10*3/UL (ref 1.2–7.7)
NEUTROPHILS NFR BLD AUTO: 71.3 %
NRBC BLD-RTO: 7.4 /100 WBCS (ref 0–0)
PLATELET # BLD AUTO: 227 X10*3/UL (ref 150–450)
POLYCHROMASIA BLD QL SMEAR: NORMAL
POTASSIUM SERPL-SCNC: 4.5 MMOL/L (ref 3.5–5.3)
PROT SERPL-MCNC: 5.7 G/DL (ref 6.4–8.2)
RBC # BLD AUTO: 2.27 X10*6/UL (ref 4.5–5.9)
RBC MORPH BLD: NORMAL
RETICS #: 0.69 X10*6/UL (ref 0.02–0.12)
RETICS/RBC NFR AUTO: 30.7 % (ref 0.5–2)
SCHISTOCYTES BLD QL SMEAR: NORMAL
SICKLE CELLS BLD QL SMEAR: NORMAL
SODIUM SERPL-SCNC: 136 MMOL/L (ref 136–145)
TARGETS BLD QL SMEAR: NORMAL
WBC # BLD AUTO: 15.1 X10*3/UL (ref 4.4–11.3)

## 2024-06-20 PROCEDURE — 83615 LACTATE (LD) (LDH) ENZYME: CPT | Performed by: NURSE PRACTITIONER

## 2024-06-20 PROCEDURE — 36415 COLL VENOUS BLD VENIPUNCTURE: CPT | Performed by: NURSE PRACTITIONER

## 2024-06-20 PROCEDURE — 85025 COMPLETE CBC W/AUTO DIFF WBC: CPT | Performed by: NURSE PRACTITIONER

## 2024-06-20 PROCEDURE — 83021 HEMOGLOBIN CHROMOTOGRAPHY: CPT | Performed by: PHYSICIAN ASSISTANT

## 2024-06-20 PROCEDURE — 99233 SBSQ HOSP IP/OBS HIGH 50: CPT | Performed by: NURSE PRACTITIONER

## 2024-06-20 PROCEDURE — 2500000001 HC RX 250 WO HCPCS SELF ADMINISTERED DRUGS (ALT 637 FOR MEDICARE OP)

## 2024-06-20 PROCEDURE — 2500000005 HC RX 250 GENERAL PHARMACY W/O HCPCS

## 2024-06-20 PROCEDURE — 83735 ASSAY OF MAGNESIUM: CPT | Performed by: NURSE PRACTITIONER

## 2024-06-20 PROCEDURE — 2500000004 HC RX 250 GENERAL PHARMACY W/ HCPCS (ALT 636 FOR OP/ED)

## 2024-06-20 PROCEDURE — 2500000004 HC RX 250 GENERAL PHARMACY W/ HCPCS (ALT 636 FOR OP/ED): Performed by: NURSE PRACTITIONER

## 2024-06-20 PROCEDURE — 84075 ASSAY ALKALINE PHOSPHATASE: CPT | Performed by: NURSE PRACTITIONER

## 2024-06-20 PROCEDURE — 1170000001 HC PRIVATE ONCOLOGY ROOM DAILY

## 2024-06-20 PROCEDURE — 85045 AUTOMATED RETICULOCYTE COUNT: CPT | Performed by: NURSE PRACTITIONER

## 2024-06-20 SDOH — SOCIAL STABILITY: SOCIAL INSECURITY: DOES ANYONE TRY TO KEEP YOU FROM HAVING/CONTACTING OTHER FRIENDS OR DOING THINGS OUTSIDE YOUR HOME?: NO

## 2024-06-20 SDOH — SOCIAL STABILITY: SOCIAL INSECURITY: HAVE YOU HAD ANY THOUGHTS OF HARMING ANYONE ELSE?: NO

## 2024-06-20 SDOH — SOCIAL STABILITY: SOCIAL INSECURITY: HAVE YOU HAD THOUGHTS OF HARMING ANYONE ELSE?: YES

## 2024-06-20 SDOH — SOCIAL STABILITY: SOCIAL INSECURITY: ARE THERE ANY APPARENT SIGNS OF INJURIES/BEHAVIORS THAT COULD BE RELATED TO ABUSE/NEGLECT?: NO

## 2024-06-20 SDOH — SOCIAL STABILITY: SOCIAL INSECURITY: WERE YOU ABLE TO COMPLETE ALL THE BEHAVIORAL HEALTH SCREENINGS?: YES

## 2024-06-20 SDOH — SOCIAL STABILITY: SOCIAL INSECURITY: ARE YOU OR HAVE YOU BEEN THREATENED OR ABUSED PHYSICALLY, EMOTIONALLY, OR SEXUALLY BY ANYONE?: NO

## 2024-06-20 SDOH — SOCIAL STABILITY: SOCIAL INSECURITY: HAS ANYONE EVER THREATENED TO HURT YOUR FAMILY OR YOUR PETS?: NO

## 2024-06-20 SDOH — SOCIAL STABILITY: SOCIAL INSECURITY: DO YOU FEEL ANYONE HAS EXPLOITED OR TAKEN ADVANTAGE OF YOU FINANCIALLY OR OF YOUR PERSONAL PROPERTY?: NO

## 2024-06-20 SDOH — SOCIAL STABILITY: SOCIAL INSECURITY: ABUSE: ADULT

## 2024-06-20 SDOH — SOCIAL STABILITY: SOCIAL INSECURITY: DO YOU FEEL UNSAFE GOING BACK TO THE PLACE WHERE YOU ARE LIVING?: NO

## 2024-06-20 ASSESSMENT — PAIN DESCRIPTION - LOCATION
LOCATION: GENERALIZED

## 2024-06-20 ASSESSMENT — COGNITIVE AND FUNCTIONAL STATUS - GENERAL
MOBILITY SCORE: 24
MOBILITY SCORE: 24
DAILY ACTIVITIY SCORE: 24
DAILY ACTIVITIY SCORE: 24
PATIENT BASELINE BEDBOUND: NO

## 2024-06-20 ASSESSMENT — PAIN SCALES - GENERAL
PAINLEVEL_OUTOF10: 7
PAINLEVEL_OUTOF10: 9
PAINLEVEL_OUTOF10: 7
PAINLEVEL_OUTOF10: 9
PAINLEVEL_OUTOF10: 5 - MODERATE PAIN
PAINLEVEL_OUTOF10: 10 - WORST POSSIBLE PAIN
PAINLEVEL_OUTOF10: 9
PAINLEVEL_OUTOF10: 8
PAINLEVEL_OUTOF10: 10 - WORST POSSIBLE PAIN
PAINLEVEL_OUTOF10: 7
PAINLEVEL_OUTOF10: 10 - WORST POSSIBLE PAIN
PAINLEVEL_OUTOF10: 7
PAINLEVEL_OUTOF10: 10 - WORST POSSIBLE PAIN
PAINLEVEL_OUTOF10: 9
PAINLEVEL_OUTOF10: 10 - WORST POSSIBLE PAIN
PAINLEVEL_OUTOF10: 8

## 2024-06-20 ASSESSMENT — LIFESTYLE VARIABLES
SKIP TO QUESTIONS 9-10: 1
HOW OFTEN DO YOU HAVE 6 OR MORE DRINKS ON ONE OCCASION: NEVER
HOW MANY STANDARD DRINKS CONTAINING ALCOHOL DO YOU HAVE ON A TYPICAL DAY: PATIENT DOES NOT DRINK
HOW OFTEN DO YOU HAVE A DRINK CONTAINING ALCOHOL: NEVER
AUDIT-C TOTAL SCORE: 0
SUBSTANCE_ABUSE_PAST_12_MONTHS: NO
PRESCIPTION_ABUSE_PAST_12_MONTHS: NO
AUDIT-C TOTAL SCORE: 0

## 2024-06-20 ASSESSMENT — PAIN - FUNCTIONAL ASSESSMENT
PAIN_FUNCTIONAL_ASSESSMENT: 0-10

## 2024-06-20 ASSESSMENT — PATIENT HEALTH QUESTIONNAIRE - PHQ9
2. FEELING DOWN, DEPRESSED OR HOPELESS: NOT AT ALL
SUM OF ALL RESPONSES TO PHQ9 QUESTIONS 1 & 2: 0
1. LITTLE INTEREST OR PLEASURE IN DOING THINGS: NOT AT ALL

## 2024-06-20 ASSESSMENT — PAIN DESCRIPTION - PROGRESSION
CLINICAL_PROGRESSION: OTHER (COMMENT)
CLINICAL_PROGRESSION: OTHER (COMMENT)

## 2024-06-20 ASSESSMENT — ACTIVITIES OF DAILY LIVING (ADL)
PATIENT'S MEMORY ADEQUATE TO SAFELY COMPLETE DAILY ACTIVITIES?: YES
HEARING - LEFT EAR: FUNCTIONAL
JUDGMENT_ADEQUATE_SAFELY_COMPLETE_DAILY_ACTIVITIES: YES
WALKS IN HOME: INDEPENDENT
LACK_OF_TRANSPORTATION: NO
HEARING - RIGHT EAR: FUNCTIONAL
FEEDING YOURSELF: INDEPENDENT
ADEQUATE_TO_COMPLETE_ADL: YES
DRESSING YOURSELF: INDEPENDENT
TOILETING: INDEPENDENT
GROOMING: INDEPENDENT
BATHING: INDEPENDENT

## 2024-06-20 NOTE — PROGRESS NOTES
"Joellen Narayan is a 23 y.o. male on day 2 of admission presenting with Sickle cell crisis (Multi).    Subjective   Seen this AM in the ED. Pt still waiting for a bed in [facility]. He states his pain is controlled this morning. CP is improving. Denies any SOB. No fevers/chills. Discussed continuing pain regimen today and possibly weaning tomorrow.    Objective     Physical Exam  Vitals reviewed.   Constitutional:       Appearance: Normal appearance.   HENT:      Head: Normocephalic and atraumatic.      Nose: Nose normal.      Mouth/Throat:      Mouth: Mucous membranes are moist.      Pharynx: Oropharynx is clear.   Eyes:      Extraocular Movements: Extraocular movements intact.      Pupils: Pupils are equal, round, and reactive to light.   Cardiovascular:      Rate and Rhythm: Normal rate and regular rhythm.      Pulses: Normal pulses.      Heart sounds: Normal heart sounds.   Pulmonary:      Effort: Pulmonary effort is normal.      Breath sounds: Normal breath sounds.      Comments: 2L NC  Abdominal:      General: Bowel sounds are normal.      Palpations: Abdomen is soft.   Musculoskeletal:         General: Normal range of motion.   Skin:     General: Skin is warm.   Neurological:      General: No focal deficit present.      Mental Status: He is alert and oriented to person, place, and time. Mental status is at baseline.   Psychiatric:         Mood and Affect: Mood normal.         Behavior: Behavior normal.       Last Recorded Vitals  Blood pressure 112/57, pulse 68, temperature 36.7 °C (98 °F), temperature source Oral, resp. rate 19, height 1.88 m (6' 2\"), weight 68 kg (150 lb), SpO2 100%.  Intake/Output last 3 Shifts:  I/O last 3 completed shifts:  In: 393.8 (5.8 mL/kg) [Blood:393.8]  Out: 700 (10.3 mL/kg) [Urine:700 (0.3 mL/kg/hr)]  Weight: 68 kg     Results for orders placed or performed during the hospital encounter of 06/18/24 (from the past 24 hour(s))   CBC and Auto Differential   Result Value Ref Range    " WBC 15.1 (H) 4.4 - 11.3 x10*3/uL    nRBC 7.4 (H) 0.0 - 0.0 /100 WBCs    RBC 2.27 (L) 4.50 - 5.90 x10*6/uL    Hemoglobin 7.3 (L) 13.5 - 17.5 g/dL    Hematocrit 18.9 (L) 41.0 - 52.0 %    MCV 83 80 - 100 fL    MCH 32.2 26.0 - 34.0 pg    MCHC 38.6 (H) 32.0 - 36.0 g/dL    RDW 24.0 (H) 11.5 - 14.5 %    Platelets 227 150 - 450 x10*3/uL    Neutrophils % 71.3 40.0 - 80.0 %    Immature Granulocytes %, Automated 0.9 0.0 - 0.9 %    Lymphocytes % 17.2 13.0 - 44.0 %    Monocytes % 9.4 2.0 - 10.0 %    Eosinophils % 0.9 0.0 - 6.0 %    Basophils % 0.3 0.0 - 2.0 %    Neutrophils Absolute 10.77 (H) 1.20 - 7.70 x10*3/uL    Immature Granulocytes Absolute, Automated 0.13 0.00 - 0.70 x10*3/uL    Lymphocytes Absolute 2.59 1.20 - 4.80 x10*3/uL    Monocytes Absolute 1.42 (H) 0.10 - 1.00 x10*3/uL    Eosinophils Absolute 0.14 0.00 - 0.70 x10*3/uL    Basophils Absolute 0.04 0.00 - 0.10 x10*3/uL   Comprehensive Metabolic Panel   Result Value Ref Range    Glucose 85 74 - 99 mg/dL    Sodium 136 136 - 145 mmol/L    Potassium 4.5 3.5 - 5.3 mmol/L    Chloride 104 98 - 107 mmol/L    Bicarbonate 25 21 - 32 mmol/L    Anion Gap 12 10 - 20 mmol/L    Urea Nitrogen 9 6 - 23 mg/dL    Creatinine 0.61 0.50 - 1.30 mg/dL    eGFR >90 >60 mL/min/1.73m*2    Calcium 8.9 8.6 - 10.6 mg/dL    Albumin 4.1 3.4 - 5.0 g/dL    Alkaline Phosphatase 89 33 - 120 U/L    Total Protein 5.7 (L) 6.4 - 8.2 g/dL    AST 72 (H) 9 - 39 U/L    Bilirubin, Total 10.1 (H) 0.0 - 1.2 mg/dL    ALT 34 10 - 52 U/L   Magnesium   Result Value Ref Range    Magnesium 2.25 1.60 - 2.40 mg/dL   Lactate dehydrogenase   Result Value Ref Range     (H) 84 - 246 U/L   Reticulocytes   Result Value Ref Range    Retic % 30.7 (H) 0.5 - 2.0 %    Retic Absolute 0.695 (H) 0.022 - 0.118 x10*6/uL    Reticulocyte Hemoglobin 30 28 - 38 pg    Immature Retic fraction 30.1 (H) <=16.0 %   Morphology   Result Value Ref Range    RBC Morphology See Below     Polychromasia Mild     RBC Fragments Few     Sickle Cells  Many     Target Cells Few      Scheduled medications  enoxaparin, 40 mg, subcutaneous, q24h  folic acid, 1 mg, oral, Daily  ketorolac, 30 mg, intravenous, q6h REYNALDO  lidocaine, 30 mL, infiltration, Once  pantoprazole, 40 mg, oral, Daily before breakfast  phenylephrine in NS, 1,000 mcg, intracavernosal, Once  polyethylene glycol, 17 g, oral, Daily  sennosides-docusate sodium, 2 tablet, oral, q12h      Continuous medications     PRN medications  PRN medications: diphenhydrAMINE, HYDROmorphone, ondansetron, pseudoephedrine    Assessment/Plan   Principal Problem:    Sickle cell crisis (Multi)    Joellen Narayan is a 23 y.o. male with a PMH of newly diagnosed nodular lymphocyte predominant Hodgkins lymphoma (NLPHL) (on rituxan/prednisone, last received C6 on 6/7/24), 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, mild splenic sequestration in 2001, priapism, acute chest syndrome last in 2/2023), nocturnal hypoxia (not on O2 at home), who presented to [facility] ED 6/18 with chest pain, pain in his groin and RLQ of his abdomen typical of his sickle cell pain. Of note pt was hypoxic on admission, placed on 2L supp O2. Urology consulted in ED (6/18) for priapism, corporal aspiration was attempted in the ED without return of blood. Priapism resolved with O2 and IVF. ACS consulted in ED (6/18) for poss pneumoperitoneum seen on CXR, CT CAP showed no evidence of pneumoperitoneum and ACS rec no surgical interventions. Hematology consulted in ED (6/18) rec 1u pRBC for Hgb 6.6. Admitted for further management. Started on IV dilaudid for pain management. DC pending improvement in pain.     # Hgb SS with severe pain  - OARRS reviewed, no aberrancies  - No current care path  - Hgb baseline ~8.5, Hgb 6.6 (6/18)--> s/p 1unit PRBC--> Hgb 7.7 (6/19)--> 7.3 (6/20)  - Tbili baseline fluctuates ~5-13, Tbili 9.8 (6/18)--> 8.6 (6/19)--> 10.1 (6/20); will continue to monitor  - LDH baseline fluctuates but ~500,  (6/18)--> 662 (6/19)-->  647 (6/20)  - Leukocytosis noted on admission (6/18 WBC 15.2--> improved to 12.2 as of 6/19--> 15.1 (6/20)), likely reactive as pt afebrile with no s/sx of infectious process  - Pt with CP and hypoxia with elevated Hgb S, however these are all consistent with pt's baseline presentation of sickle cell crisis. In the absence of imaging findings c/f infection or ACS, no SOB/wheezing, and no fevers-- low c/f ACS at this time, however will monitor closely  - UA (6/18) neg  - Troponin (6/18) 4  - EKG (6/18) NSR  - Hematology consulted in ED (6/18) rec 1u pRBC and obtaining Hgb S, signed off 6/19  - On admit started IV dilaudid 3mg q2hrs PRN severe pain (6/18- current)  - Started IV Toradol 30mg q6hrs x3 days (6/19- plan stop 6/21) with Protonix for PPI prophy  - Continue folic acid 1mg daily  - Hgb S (6/18): 94.5%  - Utox (6/18): +opiates, +cannabinoid  - PO Zofran PRN for opioid-induced nausea, PO Benadryl PRN for opioid-induced pruritus, Bowel regimen for opioid-induced constipation with DocuSenna 2tabs BID and Miralax daily      # Nodular lymphocyte predominant Hodgkins lymphoma (NLPHL)   - Primary Oncologist: Dr. Stoll  - Enlarged lymph nodes noted 4/1/22  - RUQ US (11/14/22) with mildly enlarged LNs in the region of the kavin hepatis  - MRI liver (11/16/22) showed re-demonstration of bulky retroperitoneal lymphadenopathy and kavin hepatic lymphadenopathy    - (11/18/22) lymph node biopsy showed atypical lymphoid infiltrate. Reviewed by Hemepath board, insufficient for lymphoma diagnosis  - PET/CT (12/6/22) showing retroperitoneal lymphadenopathy  - Followed up with Dr. Stoll (12/16/22) with plan for surg/onc consult for large tissue bx but patient missed apt and was lost to follow up  - Requested new apt with Dr. Ronnie Marte 6/19/23, patient was not seen and lost to follow up  - CT a/p (11/28/23) increased size of retroperitoneal lymph nodes reflecting extramedullary hematopoiesis I/s/o sickle cell vs lymphoma  -  Paraaortic LN bx via IR (11/30/23) consistent with NLPHL. Flow: no clonal B cell or T cell population identified, lymphocyte 95%, CD3+CD4+ 68%, CD3+CD8+ 7%, CD19+ 24%  - Elevated LDH/bili partially from sickle cell disease   - Chemotherapy (R-CHOP) was discussed with primary oncologist Dr. Stoll, and decision was made to simplify his chemotherapy to Rituxan and prednisone q3 weeks mainly due to frequent sickle cell crisis  - Current chemo regimen: rituxan and prednisone q3 weeks (C1 1/18/24, C2 2/8/24, Missed C3 d/t sickle cell pain crisis, C4 4/4/24, C5 5/16/24, C6 6/7/24)  - Per pt no longer taking Acyclovir 400mg BID prophy      # Priapism  - 6/18 pt presented to the ED with Priapism   - Corporal aspiration was attempted in the ED at the 2 o'clock position near the base without return of blood  - Urology consulted (6/18) rec no acute interventions necessary as there was improvement with conservative management, urology will continue to follow  - Start 30mg Sudafed q4h PRN  - New urology FUV 7/2    # Initial c/f pneumoperitoneum-- ruled out  - CXR (6/18) no acute cardiopulmonary process, Calhoun-shaped lucency under the left hemidiaphragm new from the previous examination. No lucency under the right hemidiaphragm seen. This favors air-fluid level with a somewhat atypical appearance of the gastric bubble. Correlate with any abdominal symptomatology to the need for dedicated abdominal radiographs.   - CT C/A/P (6/18) No evidence of pneumoperitoneum, Moderate distention of the stomach and colon which appears similar to 02/16/2024. Moderate distention of the urinary bladder and mild prominence of the ureters. Please correlate for urinary retention. Skin thickening of the visualized penis. Correlation with physical examination is suggested. No acute cardiopulmonary process. Debris within the esophageal lumen which places the patient at  increased risk of aspiration.  - ACS consulted in ED (6/18) for c/f free air  under the diaphragm, rec CT A/P reviewed which showed no free air noted. Stomach appears grossly distended, which likely led to the perception of free air on CXR. Of note, the patient mentioned he had a meal ~1 hr prior to examination. No surgical interventions indicated.      # Hx of nocturnal oxygen dependence and hypoxia on room air  - Historically improves with oxygen supplementation  - Pt hypoxic on admission (SpO2 86% on RA), placed on 2L supp O2    - CXR and CT CAP (6/18) showed no signs of infectious process and pt denies SOB  - Has not had home oxygen for 2-3 years   - Pt did not qualify for home O2 per walking pulse ox performed during previous hospital admission on 2/26/24  - Missed pulm apt on 2/21/24 due to being admitted inpt  - Wean as able, encourage incentive spirometry     DVT prophy: Lovenox subcutaneous, SCDs, encourage ambulation    DISPO:  - Full code, confirmed on admit  - DC pending improvement in pain  - SUSIE Narayan (Parent) [phone]  - [facility] Urology 7/2, PET/CT 7/15, Dr. Stoll 7/25, Sickle cell requested    I spent >60 minutes in the professional and overall care of this patient    Assessment and plan discussed with attending physician Dr. James Murguia, APRN-CNP

## 2024-06-20 NOTE — CARE PLAN
Problem: Pain - Adult  Goal: Verbalizes/displays adequate comfort level or baseline comfort level  Outcome: Progressing   The patient's goals for the shift include  rate pain at acceptable level    The clinical goals for the shift include pain control    Over the shift, the patient did not make progress toward the following goals. Barriers to progression include ineffective pain regimen. Recommendations to address these barriers include contacted team for med adjustment.

## 2024-06-21 ENCOUNTER — APPOINTMENT (OUTPATIENT)
Dept: RADIOLOGY | Facility: HOSPITAL | Age: 24
DRG: 812 | End: 2024-06-21
Payer: COMMERCIAL

## 2024-06-21 LAB
ABO GROUP (TYPE) IN BLOOD: NORMAL
ALBUMIN SERPL BCP-MCNC: 3.9 G/DL (ref 3.4–5)
ALP SERPL-CCNC: 88 U/L (ref 33–120)
ALT SERPL W P-5'-P-CCNC: 29 U/L (ref 10–52)
ANION GAP SERPL CALC-SCNC: 11 MMOL/L (ref 10–20)
ANTIBODY SCREEN: NORMAL
AST SERPL W P-5'-P-CCNC: 54 U/L (ref 9–39)
BASOPHILS # BLD AUTO: 0.01 X10*3/UL (ref 0–0.1)
BASOPHILS NFR BLD AUTO: 0.1 %
BILIRUB DIRECT SERPL-MCNC: 1.3 MG/DL (ref 0–0.3)
BILIRUB SERPL-MCNC: 9.3 MG/DL (ref 0–1.2)
BLOOD EXPIRATION DATE: NORMAL
BUN SERPL-MCNC: 9 MG/DL (ref 6–23)
CA-I BLD-SCNC: 1.24 MMOL/L (ref 1.1–1.33)
CALCIUM SERPL-MCNC: 8.8 MG/DL (ref 8.6–10.6)
CHLORIDE SERPL-SCNC: 105 MMOL/L (ref 98–107)
CO2 SERPL-SCNC: 28 MMOL/L (ref 21–32)
CREAT SERPL-MCNC: 0.54 MG/DL (ref 0.5–1.3)
DISPENSE STATUS: NORMAL
EGFRCR SERPLBLD CKD-EPI 2021: >90 ML/MIN/1.73M*2
EOSINOPHIL # BLD AUTO: 0.27 X10*3/UL (ref 0–0.7)
EOSINOPHIL NFR BLD AUTO: 3 %
ERYTHROCYTE [DISTWIDTH] IN BLOOD BY AUTOMATED COUNT: 24.6 % (ref 11.5–14.5)
GLUCOSE SERPL-MCNC: 80 MG/DL (ref 74–99)
HCT VFR BLD AUTO: 17.7 % (ref 41–52)
HEMOGLOBIN A2: ABNORMAL
HEMOGLOBIN A: ABNORMAL
HEMOGLOBIN F: ABNORMAL
HEMOGLOBIN IDENTIFICATION INTERPRETATION: ABNORMAL
HEMOGLOBIN S: 77.3 %
HGB BLD-MCNC: 6.6 G/DL (ref 13.5–17.5)
HGB RETIC QN: 30 PG (ref 28–38)
HOLD SPECIMEN: NORMAL
IMM GRANULOCYTES # BLD AUTO: 0.1 X10*3/UL (ref 0–0.7)
IMM GRANULOCYTES NFR BLD AUTO: 1.1 % (ref 0–0.9)
IMMATURE RETIC FRACTION: 27.8 %
LDH SERPL L TO P-CCNC: 577 U/L (ref 84–246)
LYMPHOCYTES # BLD AUTO: 1.99 X10*3/UL (ref 1.2–4.8)
LYMPHOCYTES NFR BLD AUTO: 22 %
MAGNESIUM SERPL-MCNC: 2.17 MG/DL (ref 1.6–2.4)
MCH RBC QN AUTO: 31.4 PG (ref 26–34)
MCHC RBC AUTO-ENTMCNC: 37.3 G/DL (ref 32–36)
MCV RBC AUTO: 84 FL (ref 80–100)
MONOCYTES # BLD AUTO: 1.09 X10*3/UL (ref 0.1–1)
MONOCYTES NFR BLD AUTO: 12.1 %
NEUTROPHILS # BLD AUTO: 5.58 X10*3/UL (ref 1.2–7.7)
NEUTROPHILS NFR BLD AUTO: 61.7 %
NRBC BLD-RTO: 6.4 /100 WBCS (ref 0–0)
PATH REVIEW-HGB IDENTIFICATION: ABNORMAL
PLATELET # BLD AUTO: 238 X10*3/UL (ref 150–450)
POLYCHROMASIA BLD QL SMEAR: NORMAL
POTASSIUM SERPL-SCNC: 4.3 MMOL/L (ref 3.5–5.3)
PRODUCT BLOOD TYPE: 9500
PRODUCT CODE: NORMAL
PROT SERPL-MCNC: 5.7 G/DL (ref 6.4–8.2)
RBC # BLD AUTO: 2.1 X10*6/UL (ref 4.5–5.9)
RBC MORPH BLD: NORMAL
RETICS #: 0.52 X10*6/UL (ref 0.02–0.12)
RETICS/RBC NFR AUTO: 24.8 % (ref 0.5–2)
RH FACTOR (ANTIGEN D): NORMAL
SICKLE CELLS BLD QL SMEAR: NORMAL
SODIUM SERPL-SCNC: 140 MMOL/L (ref 136–145)
TARGETS BLD QL SMEAR: NORMAL
UNIT ABO: NORMAL
UNIT NUMBER: NORMAL
UNIT RH: NORMAL
UNIT VOLUME: 281
WBC # BLD AUTO: 9 X10*3/UL (ref 4.4–11.3)
XM INTEP: NORMAL

## 2024-06-21 PROCEDURE — 82330 ASSAY OF CALCIUM: CPT | Performed by: PHYSICIAN ASSISTANT

## 2024-06-21 PROCEDURE — 86920 COMPATIBILITY TEST SPIN: CPT

## 2024-06-21 PROCEDURE — 86901 BLOOD TYPING SEROLOGIC RH(D): CPT | Performed by: PHYSICIAN ASSISTANT

## 2024-06-21 PROCEDURE — 85045 AUTOMATED RETICULOCYTE COUNT: CPT | Performed by: NURSE PRACTITIONER

## 2024-06-21 PROCEDURE — 99233 SBSQ HOSP IP/OBS HIGH 50: CPT

## 2024-06-21 PROCEDURE — 85025 COMPLETE CBC W/AUTO DIFF WBC: CPT | Performed by: NURSE PRACTITIONER

## 2024-06-21 PROCEDURE — 2500000001 HC RX 250 WO HCPCS SELF ADMINISTERED DRUGS (ALT 637 FOR MEDICARE OP)

## 2024-06-21 PROCEDURE — 1170000001 HC PRIVATE ONCOLOGY ROOM DAILY

## 2024-06-21 PROCEDURE — 80053 COMPREHEN METABOLIC PANEL: CPT | Performed by: NURSE PRACTITIONER

## 2024-06-21 PROCEDURE — 71045 X-RAY EXAM CHEST 1 VIEW: CPT | Performed by: RADIOLOGY

## 2024-06-21 PROCEDURE — 83735 ASSAY OF MAGNESIUM: CPT | Performed by: NURSE PRACTITIONER

## 2024-06-21 PROCEDURE — 71045 X-RAY EXAM CHEST 1 VIEW: CPT

## 2024-06-21 PROCEDURE — 83615 LACTATE (LD) (LDH) ENZYME: CPT | Performed by: NURSE PRACTITIONER

## 2024-06-21 PROCEDURE — 2500000004 HC RX 250 GENERAL PHARMACY W/ HCPCS (ALT 636 FOR OP/ED)

## 2024-06-21 PROCEDURE — 36430 TRANSFUSION BLD/BLD COMPNT: CPT

## 2024-06-21 PROCEDURE — 36415 COLL VENOUS BLD VENIPUNCTURE: CPT | Performed by: PHYSICIAN ASSISTANT

## 2024-06-21 PROCEDURE — P9040 RBC LEUKOREDUCED IRRADIATED: HCPCS

## 2024-06-21 PROCEDURE — 82248 BILIRUBIN DIRECT: CPT

## 2024-06-21 RX ORDER — PSEUDOEPHEDRINE HCL 30 MG
30 TABLET ORAL ONCE
Status: COMPLETED | OUTPATIENT
Start: 2024-06-21 | End: 2024-06-21

## 2024-06-21 RX ORDER — PSEUDOEPHEDRINE HYDROCHLORIDE 60 MG/1
60 TABLET ORAL EVERY 6 HOURS PRN
Status: DISCONTINUED | OUTPATIENT
Start: 2024-06-21 | End: 2024-06-25 | Stop reason: HOSPADM

## 2024-06-21 RX ORDER — DIPHENHYDRAMINE HCL 50 MG
50 CAPSULE ORAL ONCE
Status: COMPLETED | OUTPATIENT
Start: 2024-06-21 | End: 2024-06-21

## 2024-06-21 RX ORDER — HYDROMORPHONE HYDROCHLORIDE 1 MG/ML
1 INJECTION, SOLUTION INTRAMUSCULAR; INTRAVENOUS; SUBCUTANEOUS ONCE
Status: COMPLETED | OUTPATIENT
Start: 2024-06-21 | End: 2024-06-21

## 2024-06-21 ASSESSMENT — PAIN SCALES - GENERAL
PAINLEVEL_OUTOF10: 6
PAINLEVEL_OUTOF10: 9
PAINLEVEL_OUTOF10: 7
PAINLEVEL_OUTOF10: 7
PAINLEVEL_OUTOF10: 8
PAINLEVEL_OUTOF10: 9
PAINLEVEL_OUTOF10: 5 - MODERATE PAIN
PAINLEVEL_OUTOF10: 9
PAINLEVEL_OUTOF10: 7
PAINLEVEL_OUTOF10: 8
PAINLEVEL_OUTOF10: 9
PAINLEVEL_OUTOF10: 7
PAINLEVEL_OUTOF10: 9
PAINLEVEL_OUTOF10: 10 - WORST POSSIBLE PAIN
PAINLEVEL_OUTOF10: 9
PAINLEVEL_OUTOF10: 7
PAINLEVEL_OUTOF10: 7
PAINLEVEL_OUTOF10: 9
PAINLEVEL_OUTOF10: 10 - WORST POSSIBLE PAIN
PAINLEVEL_OUTOF10: 8
PAINLEVEL_OUTOF10: 8
PAINLEVEL_OUTOF10: 6
PAINLEVEL_OUTOF10: 8
PAINLEVEL_OUTOF10: 10 - WORST POSSIBLE PAIN

## 2024-06-21 ASSESSMENT — COGNITIVE AND FUNCTIONAL STATUS - GENERAL
MOBILITY SCORE: 24
DAILY ACTIVITIY SCORE: 24

## 2024-06-21 ASSESSMENT — PAIN SCALES - PAIN ASSESSMENT IN ADVANCED DEMENTIA (PAINAD): TOTALSCORE: MEDICATION (SEE MAR)

## 2024-06-21 ASSESSMENT — PAIN DESCRIPTION - LOCATION
LOCATION: RIB CAGE
LOCATION: CHEST
LOCATION: PENIS
LOCATION: CHEST
LOCATION: PENIS

## 2024-06-21 ASSESSMENT — PAIN - FUNCTIONAL ASSESSMENT
PAIN_FUNCTIONAL_ASSESSMENT: 0-10

## 2024-06-21 NOTE — PROGRESS NOTES
"Joellen Narayan is a 23 y.o. male on day 3 of admission presenting with Sickle cell crisis (Multi).    Subjective   Patient seen at bedside. States that he has been experiencing some chest pain as well as increased RUQ abd pain. Patient rates pain 6/10. Also states that he continues to have some priapism which has improved since yesterday but has not been relieved. Denies any new SOB, CP, heart palpitation N/V/D/C, headache dizziness or vision changes        Objective     Physical Exam  Exam conducted with a chaperone present.   HENT:      Head: Normocephalic.      Nose: Nose normal.      Mouth/Throat:      Mouth: Mucous membranes are moist.   Eyes:      Pupils: Pupils are equal, round, and reactive to light.   Cardiovascular:      Rate and Rhythm: Normal rate.      Pulses: Normal pulses.   Pulmonary:      Effort: Pulmonary effort is normal.   Abdominal:      Palpations: Abdomen is soft.   Genitourinary:     Penis: Tenderness present.       Comments: Erected soft, appearing penis. Tender when attempting to bend   Musculoskeletal:         General: Normal range of motion.      Cervical back: Normal range of motion.   Skin:     General: Skin is warm.      Capillary Refill: Capillary refill takes less than 2 seconds.   Neurological:      General: No focal deficit present.      Mental Status: He is alert.   Psychiatric:         Mood and Affect: Mood normal.         Behavior: Behavior normal.         Last Recorded Vitals  Blood pressure 138/78, pulse 72, temperature 36.8 °C (98.2 °F), temperature source Temporal, resp. rate 18, height 1.88 m (6' 2\"), weight 68 kg (150 lb), SpO2 95%.  Intake/Output last 3 Shifts:  I/O last 3 completed shifts:  In: 600 (8.8 mL/kg) [P.O.:600]  Out: 200 (2.9 mL/kg) [Urine:200 (0.1 mL/kg/hr)]  Weight: 68 kg     Relevant Results                             Assessment/Plan   Principal Problem:    Sickle cell crisis (Multi)    Joellen Narayan is a 23 y.o. 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 Of note pt was hypoxic on admission, placed on 2L supp O2. Urology consulted in ED (6/18) for priapism, corporal aspiration was attempted in the ED without return of blood. Priapism resolved with O2 and IVF. ACS consulted in ED (6/18) for poss pneumoperitoneum seen on CXR, CT CAP showed no evidence of pneumoperitoneum and ACS rec no surgical interventions. Hematology consulted in ED (6/18) rec 1u pRBC for Hgb 6.6. Admitted for further management. Started on IV dilaudid for pain management. DC pending improvement in pain.     # Hgb SS with severe pain  - OARRS reviewed, no aberrancies  - No current care path  - Hgb baseline ~8.5, Hgb 6.6 (6/18)--> s/p 1unit PRBC--> Hgb 7.7 (6/19)--> 7.3 (6/20)  - Tbili baseline fluctuates ~5-13, Tbili 9.8 (6/18)--> 8.6 (6/19)--> 10.1 (6/20); will continue to monitor  - LDH baseline fluctuates but ~500,  (6/18)--> 662 (6/19)--> 647 (6/20)  - Leukocytosis noted on admission (6/18 WBC 15.2--> improved to 12.2 as of 6/19--> 15.1 (6/20)), likely reactive as pt afebrile with no s/sx of infectious process  - Pt with CP and hypoxia with elevated Hgb S, however these are all consistent with pt's baseline presentation of sickle cell crisis. In the absence of imaging findings c/f infection or ACS, no SOB/wheezing, and no fevers-- low c/f ACS at this time, however will monitor closely  - UA (6/18) neg  - Troponin (6/18) 4  - EKG (6/18) NSR  - Hematology consulted in ED (6/18) rec 1u pRBC and obtaining Hgb S, signed off 6/19  - 6/21 additional 1u PRBC given for hgb 6.6  - On admit started  IV dilaudid 3mg q2hrs PRN severe pain (6/18- current)  - Started IV Toradol 30mg q6hrs x3 days (6/19- plan stop 6/21) with Protonix for PPI prophy  - Continue folic acid 1mg daily  - Hgb S (6/18): 94.5%  - Utox (6/18): +opiates, +cannabinoid  - 6/21 repeat CXR d/t increased CP neg for acute process   - PO Zofran PRN for opioid-induced nausea, PO Benadryl PRN for opioid-induced pruritus, Bowel regimen for opioid-induced constipation with DocuSenna 2tabs BID and Miralax daily      # Nodular lymphocyte predominant Hodgkins lymphoma (NLPHL)   - Primary Oncologist: Dr. Stoll  - Enlarged lymph nodes noted 4/1/22  - RUQ US (11/14/22) with mildly enlarged LNs in the region of the kavin hepatis  - MRI liver (11/16/22) showed re-demonstration of bulky retroperitoneal lymphadenopathy and kavin hepatic lymphadenopathy    - (11/18/22) lymph node biopsy showed atypical lymphoid infiltrate. Reviewed by Hemepath board, insufficient for lymphoma diagnosis  - PET/CT (12/6/22) showing retroperitoneal lymphadenopathy  - Followed up with Dr. Stoll (12/16/22) with plan for surg/onc consult for large tissue bx but patient missed apt and was lost to follow up  - Requested new apt with Dr. Ronnie Marte 6/19/23, patient was not seen and lost to follow up  - CT a/p (11/28/23) increased size of retroperitoneal lymph nodes reflecting extramedullary hematopoiesis I/s/o sickle cell vs lymphoma  - Paraaortic LN bx via IR (11/30/23) consistent with NLPHL. Flow: no clonal B cell or T cell population identified, lymphocyte 95%, CD3+CD4+ 68%, CD3+CD8+ 7%, CD19+ 24%  - Elevated LDH/bili partially from sickle cell disease   - Chemotherapy (R-CHOP) was discussed with primary oncologist Dr. Stoll, and decision was made to simplify his chemotherapy to Rituxan and prednisone q3 weeks mainly due to frequent sickle cell crisis  - Current chemo regimen: rituxan and prednisone q3 weeks (C1 1/18/24, C2 2/8/24, Missed C3 d/t sickle cell pain crisis, C4 4/4/24,  C5 5/16/24, C6 6/7/24)  - Per pt no longer taking Acyclovir 400mg BID prophy      # Priapism  - 6/18 pt presented to the ED with Priapism   - Corporal aspiration was attempted in the ED at the 2 o'clock position near the base without return of blood  - Urology consulted (6/18) rec no acute interventions necessary as there was improvement with conservative management, urology will continue to follow  - Start 30mg Sudafed q4h PRN. 6/21 increased to 60mg q6 for persistent mild priapism   - New urology FUV 7/2     # Initial c/f pneumoperitoneum-- ruled out  - CXR (6/18) no acute cardiopulmonary process, [place]-shaped lucency under the left hemidiaphragm new from the previous examination. No lucency under the right hemidiaphragm seen. This favors air-fluid level with a somewhat atypical appearance of the gastric bubble. Correlate with any abdominal symptomatology to the need for dedicated abdominal radiographs.   - CT C/A/P (6/18) No evidence of pneumoperitoneum, Moderate distention of the stomach and colon which appears similar to 02/16/2024. Moderate distention of the urinary bladder and mild prominence of the ureters. Please correlate for urinary retention. Skin thickening of the visualized penis. Correlation with physical examination is suggested. No acute cardiopulmonary process. Debris within the esophageal lumen which places the patient at  increased risk of aspiration.  - ACS consulted in ED (6/18) for c/f free air under the diaphragm, rec CT A/P reviewed which showed no free air noted. Stomach appears grossly distended, which likely led to the perception of free air on CXR. Of note, the patient mentioned he had a meal ~1 hr prior to examination. No surgical interventions indicated.      # Hx of nocturnal oxygen dependence and hypoxia on room air  - Historically improves with oxygen supplementation  - Pt hypoxic on admission (SpO2 86% on RA), placed on 2L supp O2    - CXR and CT CAP (6/18) showed no signs of  infectious process and pt denies SOB  - Has not had home oxygen for 2-3 years   - Pt did not qualify for home O2 per walking pulse ox performed during previous hospital admission on 2/26/24  - Missed pulm apt on 2/21/24 due to being admitted inpt  - Wean as able, encourage incentive spirometry     DVT prophy: Lovenox subcutaneous, SCDs, encourage ambulation     DISPO:  - Full code, confirmed on admit  - DC pending improvement in pain  - SUSIE Narayan (Parent) [phone]  - FUV Urology 7/2, PET/CT 7/15, Dr. Stoll 7/25, Sickle cell requested         I spent 60 minutes in the professional and overall care of this patient.      RAAD Pathak-CNP  Patient discussed with Dr. Ochoa

## 2024-06-21 NOTE — CARE PLAN
The patient's goals for the shift include      The clinical goals for the shift include pt will remain safe and free form injuries and falls throughout shift      Problem: Pain - Adult  Goal: Verbalizes/displays adequate comfort level or baseline comfort level  Outcome: Progressing     Problem: Safety - Adult  Goal: Free from fall injury  Outcome: Progressing     Problem: Skin  Goal: Decreased wound size/increased tissue granulation at next dressing change  Outcome: Progressing  Goal: Participates in plan/prevention/treatment measures  Outcome: Progressing  Goal: Prevent/manage excess moisture  Outcome: Progressing  Goal: Prevent/minimize sheer/friction injuries  Outcome: Progressing  Goal: Promote/optimize nutrition  Outcome: Progressing  Goal: Promote skin healing  Outcome: Progressing

## 2024-06-21 NOTE — CARE PLAN
The patient's goals for the shift include      The clinical goals for the shift include pt will remain safe and free from injury through shift    Problem: Pain - Adult  Goal: Verbalizes/displays adequate comfort level or baseline comfort level  Outcome: Progressing     Problem: Safety - Adult  Goal: Free from fall injury  Outcome: Progressing     Problem: Chronic Conditions and Co-morbidities  Goal: Patient's chronic conditions and co-morbidity symptoms are monitored and maintained or improved  Outcome: Progressing

## 2024-06-22 LAB
ALBUMIN SERPL BCP-MCNC: 4.3 G/DL (ref 3.4–5)
ALP SERPL-CCNC: 109 U/L (ref 33–120)
ALT SERPL W P-5'-P-CCNC: 34 U/L (ref 10–52)
ANION GAP SERPL CALC-SCNC: 14 MMOL/L (ref 10–20)
AST SERPL W P-5'-P-CCNC: 64 U/L (ref 9–39)
BASOPHILS # BLD AUTO: 0 X10*3/UL (ref 0–0.1)
BASOPHILS NFR BLD AUTO: 0 %
BILIRUB SERPL-MCNC: 7.5 MG/DL (ref 0–1.2)
BUN SERPL-MCNC: 7 MG/DL (ref 6–23)
CALCIUM SERPL-MCNC: 9.4 MG/DL (ref 8.6–10.6)
CHLORIDE SERPL-SCNC: 103 MMOL/L (ref 98–107)
CO2 SERPL-SCNC: 28 MMOL/L (ref 21–32)
CREAT SERPL-MCNC: 0.58 MG/DL (ref 0.5–1.3)
EGFRCR SERPLBLD CKD-EPI 2021: >90 ML/MIN/1.73M*2
EOSINOPHIL # BLD AUTO: 0.14 X10*3/UL (ref 0–0.7)
EOSINOPHIL NFR BLD AUTO: 6.4 %
ERYTHROCYTE [DISTWIDTH] IN BLOOD BY AUTOMATED COUNT: 21.4 % (ref 11.5–14.5)
GLUCOSE BLD MANUAL STRIP-MCNC: 83 MG/DL (ref 74–99)
GLUCOSE SERPL-MCNC: 52 MG/DL (ref 74–99)
HCT VFR BLD AUTO: 20.5 % (ref 41–52)
HGB BLD-MCNC: 7.5 G/DL (ref 13.5–17.5)
HGB RETIC QN: 31 PG (ref 28–38)
IMM GRANULOCYTES # BLD AUTO: 0.02 X10*3/UL (ref 0–0.7)
IMM GRANULOCYTES NFR BLD AUTO: 0.9 % (ref 0–0.9)
IMMATURE RETIC FRACTION: 20.1 %
LDH SERPL L TO P-CCNC: 673 U/L (ref 84–246)
LYMPHOCYTES # BLD AUTO: 0.54 X10*3/UL (ref 1.2–4.8)
LYMPHOCYTES NFR BLD AUTO: 24.8 %
MAGNESIUM SERPL-MCNC: 2.32 MG/DL (ref 1.6–2.4)
MCH RBC QN AUTO: 30 PG (ref 26–34)
MCHC RBC AUTO-ENTMCNC: 36.6 G/DL (ref 32–36)
MCV RBC AUTO: 82 FL (ref 80–100)
MONOCYTES # BLD AUTO: 0.22 X10*3/UL (ref 0.1–1)
MONOCYTES NFR BLD AUTO: 10.1 %
NEUTROPHILS # BLD AUTO: 1.26 X10*3/UL (ref 1.2–7.7)
NEUTROPHILS NFR BLD AUTO: 57.8 %
NRBC BLD-RTO: 3.7 /100 WBCS (ref 0–0)
PLATELET # BLD AUTO: 285 X10*3/UL (ref 150–450)
POTASSIUM SERPL-SCNC: 4.2 MMOL/L (ref 3.5–5.3)
PROT SERPL-MCNC: 6.8 G/DL (ref 6.4–8.2)
RBC # BLD AUTO: 2.5 X10*6/UL (ref 4.5–5.9)
RETICS #: 0.59 X10*6/UL (ref 0.02–0.12)
RETICS/RBC NFR AUTO: 22 % (ref 0.5–2)
SODIUM SERPL-SCNC: 141 MMOL/L (ref 136–145)
WBC # BLD AUTO: 10.9 X10*3/UL (ref 4.4–11.3)

## 2024-06-22 PROCEDURE — 83615 LACTATE (LD) (LDH) ENZYME: CPT | Performed by: NURSE PRACTITIONER

## 2024-06-22 PROCEDURE — 85045 AUTOMATED RETICULOCYTE COUNT: CPT | Performed by: NURSE PRACTITIONER

## 2024-06-22 PROCEDURE — 80053 COMPREHEN METABOLIC PANEL: CPT | Performed by: NURSE PRACTITIONER

## 2024-06-22 PROCEDURE — 82947 ASSAY GLUCOSE BLOOD QUANT: CPT

## 2024-06-22 PROCEDURE — 1170000001 HC PRIVATE ONCOLOGY ROOM DAILY

## 2024-06-22 PROCEDURE — 99233 SBSQ HOSP IP/OBS HIGH 50: CPT

## 2024-06-22 PROCEDURE — 83735 ASSAY OF MAGNESIUM: CPT | Performed by: NURSE PRACTITIONER

## 2024-06-22 PROCEDURE — 2500000001 HC RX 250 WO HCPCS SELF ADMINISTERED DRUGS (ALT 637 FOR MEDICARE OP)

## 2024-06-22 PROCEDURE — 85025 COMPLETE CBC W/AUTO DIFF WBC: CPT | Performed by: NURSE PRACTITIONER

## 2024-06-22 PROCEDURE — 36415 COLL VENOUS BLD VENIPUNCTURE: CPT | Performed by: NURSE PRACTITIONER

## 2024-06-22 PROCEDURE — 2500000004 HC RX 250 GENERAL PHARMACY W/ HCPCS (ALT 636 FOR OP/ED)

## 2024-06-22 RX ORDER — ACETAMINOPHEN 325 MG/1
650 TABLET ORAL EVERY 6 HOURS PRN
Status: DISCONTINUED | OUTPATIENT
Start: 2024-06-22 | End: 2024-06-23

## 2024-06-22 ASSESSMENT — COGNITIVE AND FUNCTIONAL STATUS - GENERAL
MOBILITY SCORE: 24
DAILY ACTIVITIY SCORE: 24
DAILY ACTIVITIY SCORE: 24
MOBILITY SCORE: 24
MOBILITY SCORE: 24
DAILY ACTIVITIY SCORE: 24

## 2024-06-22 ASSESSMENT — PAIN SCALES - GENERAL
PAINLEVEL_OUTOF10: 7
PAINLEVEL_OUTOF10: 8
PAINLEVEL_OUTOF10: 8
PAINLEVEL_OUTOF10: 7
PAINLEVEL_OUTOF10: 6
PAINLEVEL_OUTOF10: 5 - MODERATE PAIN
PAINLEVEL_OUTOF10: 7
PAINLEVEL_OUTOF10: 6
PAINLEVEL_OUTOF10: 8
PAINLEVEL_OUTOF10: 9
PAINLEVEL_OUTOF10: 8
PAINLEVEL_OUTOF10: 7
PAINLEVEL_OUTOF10: 7
PAINLEVEL_OUTOF10: 8
PAINLEVEL_OUTOF10: 8
PAINLEVEL_OUTOF10: 7
PAINLEVEL_OUTOF10: 6
PAINLEVEL_OUTOF10: 7
PAINLEVEL_OUTOF10: 8
PAINLEVEL_OUTOF10: 5 - MODERATE PAIN
PAINLEVEL_OUTOF10: 8
PAINLEVEL_OUTOF10: 7

## 2024-06-22 ASSESSMENT — PAIN - FUNCTIONAL ASSESSMENT
PAIN_FUNCTIONAL_ASSESSMENT: 0-10

## 2024-06-22 NOTE — PROGRESS NOTES
"Joellen Narayan is a 23 y.o. male on day 4 of admission presenting with Sickle cell crisis (Multi).    Subjective   Pt seen at bedside this AM. Reports pain still persists but now is down to a 5/10 which is an improvement. We discussed continuing current regimen for one more today and then switching to rotating PO/IV tomorrow, he is agreeable.     Denies nausea, vomiting       Objective     Physical Exam  Constitutional:       Appearance: Normal appearance.   HENT:      Head: Normocephalic.      Right Ear: External ear normal.      Left Ear: External ear normal.      Nose:      Comments: dryness  Eyes:      General: Scleral icterus present.      Extraocular Movements: Extraocular movements intact.      Pupils: Pupils are equal, round, and reactive to light.   Cardiovascular:      Rate and Rhythm: Normal rate and regular rhythm.   Pulmonary:      Effort: Pulmonary effort is normal.      Breath sounds: Normal breath sounds.   Abdominal:      General: Abdomen is flat. Bowel sounds are normal.      Palpations: Abdomen is soft.   Genitourinary:     Comments: Mild swelling, priapism  Musculoskeletal:         General: Normal range of motion.      Cervical back: Normal range of motion and neck supple.   Skin:     General: Skin is warm and dry.   Neurological:      General: No focal deficit present.      Mental Status: He is alert and oriented to person, place, and time. Mental status is at baseline.   Psychiatric:         Mood and Affect: Mood normal.         Behavior: Behavior normal.         Thought Content: Thought content normal.         Last Recorded Vitals  Blood pressure 143/70, pulse 68, temperature 36.6 °C (97.9 °F), temperature source Temporal, resp. rate 18, height 1.88 m (6' 2\"), weight 68 kg (150 lb), SpO2 96%.  Intake/Output last 3 Shifts:  I/O last 3 completed shifts:  In: 1131 (16.6 mL/kg) [P.O.:850; Blood:281]  Out: 950 (14 mL/kg) [Urine:950 (0.4 mL/kg/hr)]  Weight: 68 kg     Relevant Results    .Scheduled " medications  enoxaparin, 40 mg, subcutaneous, q24h  folic acid, 1 mg, oral, Daily  lidocaine, 30 mL, infiltration, Once  pantoprazole, 40 mg, oral, Daily before breakfast  phenylephrine in NS, 1,000 mcg, intracavernosal, Once  polyethylene glycol, 17 g, oral, Daily  sennosides-docusate sodium, 2 tablet, oral, q12h      Continuous medications     PRN medications  PRN medications: diphenhydrAMINE, HYDROmorphone, ondansetron, pseudoephedrine, sodium chloride    .  Results for orders placed or performed during the hospital encounter of 06/18/24 (from the past 24 hour(s))   CBC and Auto Differential   Result Value Ref Range    WBC 10.9 4.4 - 11.3 x10*3/uL    nRBC 3.7 (H) 0.0 - 0.0 /100 WBCs    RBC 2.50 (L) 4.50 - 5.90 x10*6/uL    Hemoglobin 7.5 (L) 13.5 - 17.5 g/dL    Hematocrit 20.5 (L) 41.0 - 52.0 %    MCV 82 80 - 100 fL    MCH 30.0 26.0 - 34.0 pg    MCHC 36.6 (H) 32.0 - 36.0 g/dL    RDW 21.4 (H) 11.5 - 14.5 %    Platelets 285 150 - 450 x10*3/uL    Neutrophils % 57.8 40.0 - 80.0 %    Immature Granulocytes %, Automated 0.9 0.0 - 0.9 %    Lymphocytes % 24.8 13.0 - 44.0 %    Monocytes % 10.1 2.0 - 10.0 %    Eosinophils % 6.4 0.0 - 6.0 %    Basophils % 0.0 0.0 - 2.0 %    Neutrophils Absolute 1.26 1.20 - 7.70 x10*3/uL    Immature Granulocytes Absolute, Automated 0.02 0.00 - 0.70 x10*3/uL    Lymphocytes Absolute 0.54 (L) 1.20 - 4.80 x10*3/uL    Monocytes Absolute 0.22 0.10 - 1.00 x10*3/uL    Eosinophils Absolute 0.14 0.00 - 0.70 x10*3/uL    Basophils Absolute 0.00 0.00 - 0.10 x10*3/uL   Comprehensive Metabolic Panel   Result Value Ref Range    Glucose 52 (LL) 74 - 99 mg/dL    Sodium 141 136 - 145 mmol/L    Potassium 4.2 3.5 - 5.3 mmol/L    Chloride 103 98 - 107 mmol/L    Bicarbonate 28 21 - 32 mmol/L    Anion Gap 14 10 - 20 mmol/L    Urea Nitrogen 7 6 - 23 mg/dL    Creatinine 0.58 0.50 - 1.30 mg/dL    eGFR >90 >60 mL/min/1.73m*2    Calcium 9.4 8.6 - 10.6 mg/dL    Albumin 4.3 3.4 - 5.0 g/dL    Alkaline Phosphatase 109 33 -  120 U/L    Total Protein 6.8 6.4 - 8.2 g/dL    AST 64 (H) 9 - 39 U/L    Bilirubin, Total 7.5 (H) 0.0 - 1.2 mg/dL    ALT 34 10 - 52 U/L   Magnesium   Result Value Ref Range    Magnesium 2.32 1.60 - 2.40 mg/dL   Lactate dehydrogenase   Result Value Ref Range     (H) 84 - 246 U/L   Reticulocytes   Result Value Ref Range    Retic % 22.0 (H) 0.5 - 2.0 %    Retic Absolute 0.587 (H) 0.022 - 0.118 x10*6/uL    Reticulocyte Hemoglobin 31 28 - 38 pg    Immature Retic fraction 20.1 (H) <=16.0 %           Assessment/Plan   Principal Problem:    Sickle cell crisis (Multi)    Joellen Narayan is a 23 y.o. 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 Of note, pt was hypoxic on admission, placed on 2L supp O2. 6/18 CXR w/o evidence of acute infectious process. Afebrile, VSS, (6/21) repeat CXR w/o evidence of acute process, low c/f ACS at this time. (6/18) Urology consulted in ED for priapism, s/p corporal aspiration was attempted in the ED without return of blood. Priapism resolving with O2, IVF, and sudafed PRN. ACS consulted in ED (6/18) for poss pneumoperitoneum seen on CXR, (6/18) CT CAP showed no evidence of pneumoperitoneum and ACS rec no surgical interventions. Hematology consulted in ED (6/18) rec 1u pRBC for Hgb 6.6. Admitted for further management. (6/21) Hg 6.6, s/p x1u pRBC with improvement to 7.5. Started on IVP dilaudid for sickle cell pain management (6/18-current). DC home pending improvement in pain.     # Hgb SS with acute on chronic severe pain  - OARRS reviewed, no aberrancies (last filled 2/29  oxycodone 20 tabs x5 days)   - No current care path  - Hgb baseline ~8.5, Hgb 6.6 (6/18)--> s/p 1unit PRBC--> Hgb 7.7 (6/19)--> 7.3 (6/20)  - Tbili baseline fluctuates ~5-13, Tbili 9.8 (6/18)--> 8.6 (6/19)--> 10.1 (6/20); will continue to monitor  - LDH baseline fluctuates but ~500,  (6/18)--> 662 (6/19)--> 673 (6/22)  - Leukocytosis noted on admission (6/18 WBC 15.2--> improved to 12.2 as of 6/19--> 10.9 (6/22)), likely reactive as pt afebrile with no s/sx of infectious process  - Pt with CP and hypoxia with elevated Hgb S, however these are all consistent with pt's baseline presentation of sickle cell crisis. In the absence of imaging findings c/f infection or ACS, no SOB/wheezing, and no fevers-- low c/f ACS at this time, however will monitor closely  - UA (6/18) negative  - Troponin (6/18) 4  - EKG (6/18) NSR  - Hematology consulted in ED (6/18) rec 1u pRBC and obtaining Hgb S, signed off 6/19  - 6/21 additional 1u PRBC given for hgb 6.6 --> improvement (6/22) Hg 7.5   - On admit started IV dilaudid 3mg q2hrs PRN severe pain (6/18- current), plan to rotate PO/IV tomorrow 6/23   - s/p IV Toradol 30mg q6hrs x3 days (6/19- plan stop 6/21) with Protonix for PPI prophy  - c/w home folic acid 1mg daily  - Hgb S (6/18): 94.5%; Hg S (6/20) 77.3%   - Utox (6/18): +opiates, +cannabinoid  - 6/21 repeat CXR d/t increased CP w/o evidence for acute process   - PO Zofran PRN for opioid-induced nausea, PO Benadryl PRN for opioid-induced pruritus, Bowel regimen for opioid-induced constipation with DocuSenna 2tabs BID and Miralax daily (LBM 6/22)      # Nodular lymphocyte predominant Hodgkins lymphoma (NLPHL)   - Primary Oncologist: Dr. Stoll  - Enlarged lymph nodes noted 4/1/22  - RUQ US (11/14/22) with mildly enlarged LNs in the region of the kavin hepatis  - MRI liver (11/16/22) showed re-demonstration of bulky retroperitoneal lymphadenopathy and kavin hepatic lymphadenopathy    - (11/18/22) lymph node biopsy showed  atypical lymphoid infiltrate. Reviewed by Hemepath board, insufficient for lymphoma diagnosis  - PET/CT (12/6/22) showing retroperitoneal lymphadenopathy  - Followed up with Dr. Stoll (12/16/22) with plan for surg/onc consult for large tissue bx but patient missed apt and was lost to follow up  - Requested new apt with Dr. Ronnie Marte 6/19/23, patient was not seen and lost to follow up  - CT a/p (11/28/23) increased size of retroperitoneal lymph nodes reflecting extramedullary hematopoiesis I/s/o sickle cell vs lymphoma  - Paraaortic LN bx via IR (11/30/23) consistent with NLPHL. Flow: no clonal B cell or T cell population identified, lymphocyte 95%, CD3+CD4+ 68%, CD3+CD8+ 7%, CD19+ 24%  - Elevated LDH/bili partially from sickle cell disease   - Chemotherapy (R-CHOP) was discussed with primary oncologist Dr. Stoll, and decision was made to simplify his chemotherapy to Rituxan and prednisone q3 weeks mainly due to frequent sickle cell crisis  - Current chemo regimen: rituxan and prednisone q3 weeks (C1 1/18/24, C2 2/8/24, Missed C3 d/t sickle cell pain crisis, C4 4/4/24, C5 5/16/24, C6 6/7/24)  - Per pt no longer taking Acyclovir 400mg BID prophy      # priapism, improving   - 6/18 pt presented to the ED with priapism   - Corporal aspiration was attempted in the ED at the 2 o'clock position near the base without return of blood  - Urology consulted (6/18) rec no acute interventions necessary as there was improvement with conservative management, urology will continue to follow  - Start 30mg Sudafed q4h PRN, 6/21 increased to 60mg q6 for persistent mild priapism - reports improving 6/21-6/22 but some edema noted at area where they tapped and generalized, he states he had multiple attempts at taps while in the ED  - 6/22 Rec ice packs and elevation, if worsening can obtain imaging   - New urology FUV 7/2     # Initial c/f pneumoperitoneum-- ruled out  - CXR (6/18) no acute cardiopulmonary process, [place]-shaped  lucency under the left hemidiaphragm new from the previous examination. No lucency under the right hemidiaphragm seen. This favors air-fluid level with a somewhat atypical appearance of the gastric bubble. Correlate with any abdominal symptomatology to the need for dedicated abdominal radiographs.   - CT C/A/P (6/18) No evidence of pneumoperitoneum, Moderate distention of the stomach and colon which appears similar to 02/16/2024. Moderate distention of the urinary bladder and mild prominence of the ureters. Please correlate for urinary retention. Skin thickening of the visualized penis. Correlation with physical examination is suggested. No acute cardiopulmonary process. Debris within the esophageal lumen which places the patient at  increased risk of aspiration.  - ACS consulted in ED (6/18) for c/f free air under the diaphragm, rec CT A/P reviewed which showed no free air noted. Stomach appears grossly distended, which likely led to the perception of free air on CXR. Of note, the patient mentioned he had a meal ~1 hr prior to examination. No surgical interventions indicated.      # hx of nocturnal oxygen dependence and hypoxia on room air  - Historically improves with oxygen supplementation  - Pt hypoxic on admission (SpO2 86% on RA), placed on 2L supp O2    - CXR and CT CAP (6/18) showed no signs of infectious process and pt denies SOB  - Has not had home oxygen for 2-3 years   - Pt did not qualify for home O2 per walking pulse ox performed during previous hospital admission on 2/26/24  - Missed pulm apt on 2/21/24 due to being admitted inpt  - Still on 2L NC as of 6/22, consider walking pulse ox tomorrow 6/23   - Wean as able, encourage incentive spirometry     # prophy  - Lovenox subcutaneous, SCDs, encourage ambulation  - PPI daily      DISPO:  - Full code, confirmed on admit  - DC home pending improvement in pain, poss 6/24   - NOK: Melissa Narayan (Parent) [phone]  - FU Urology 7/2, PET/CT 7/15,   Jerman 7/25, Sickle cell requested       I spent > 60 minutes in the professional and overall care of this patient.    .Assessment and plan discussed with attending physician Dr. Ochoa.         RAAD Tom-CNP

## 2024-06-22 NOTE — CARE PLAN
The patient's goals for the shift include      The clinical goals for the shift include report pain <6

## 2024-06-23 VITALS
SYSTOLIC BLOOD PRESSURE: 129 MMHG | OXYGEN SATURATION: 98 % | WEIGHT: 150 LBS | HEIGHT: 74 IN | RESPIRATION RATE: 18 BRPM | HEART RATE: 73 BPM | DIASTOLIC BLOOD PRESSURE: 74 MMHG | BODY MASS INDEX: 19.25 KG/M2 | TEMPERATURE: 99.5 F

## 2024-06-23 LAB
ALBUMIN SERPL BCP-MCNC: 4.3 G/DL (ref 3.4–5)
ALP SERPL-CCNC: 128 U/L (ref 33–120)
ALT SERPL W P-5'-P-CCNC: 39 U/L (ref 10–52)
ANION GAP SERPL CALC-SCNC: 11 MMOL/L (ref 10–20)
AST SERPL W P-5'-P-CCNC: 70 U/L (ref 9–39)
ATRIAL RATE: 70 BPM
BASOPHILS # BLD AUTO: 0.03 X10*3/UL (ref 0–0.1)
BASOPHILS NFR BLD AUTO: 0.3 %
BILIRUB SERPL-MCNC: 6.5 MG/DL (ref 0–1.2)
BUN SERPL-MCNC: 6 MG/DL (ref 6–23)
CALCIUM SERPL-MCNC: 9.5 MG/DL (ref 8.6–10.6)
CHLORIDE SERPL-SCNC: 100 MMOL/L (ref 98–107)
CO2 SERPL-SCNC: 30 MMOL/L (ref 21–32)
CREAT SERPL-MCNC: 0.47 MG/DL (ref 0.5–1.3)
EGFRCR SERPLBLD CKD-EPI 2021: >90 ML/MIN/1.73M*2
EOSINOPHIL # BLD AUTO: 0.87 X10*3/UL (ref 0–0.7)
EOSINOPHIL NFR BLD AUTO: 8.8 %
ERYTHROCYTE [DISTWIDTH] IN BLOOD BY AUTOMATED COUNT: 22.9 % (ref 11.5–14.5)
GLUCOSE SERPL-MCNC: 79 MG/DL (ref 74–99)
HCT VFR BLD AUTO: 23.4 % (ref 41–52)
HGB BLD-MCNC: 8.6 G/DL (ref 13.5–17.5)
HGB RETIC QN: 29 PG (ref 28–38)
IMM GRANULOCYTES # BLD AUTO: 0.09 X10*3/UL (ref 0–0.7)
IMM GRANULOCYTES NFR BLD AUTO: 0.9 % (ref 0–0.9)
IMMATURE RETIC FRACTION: 13.9 %
LDH SERPL L TO P-CCNC: 609 U/L (ref 84–246)
LYMPHOCYTES # BLD AUTO: 2.19 X10*3/UL (ref 1.2–4.8)
LYMPHOCYTES NFR BLD AUTO: 22.1 %
MAGNESIUM SERPL-MCNC: 2.4 MG/DL (ref 1.6–2.4)
MCH RBC QN AUTO: 31.2 PG (ref 26–34)
MCHC RBC AUTO-ENTMCNC: 36.8 G/DL (ref 32–36)
MCV RBC AUTO: 85 FL (ref 80–100)
MONOCYTES # BLD AUTO: 1.49 X10*3/UL (ref 0.1–1)
MONOCYTES NFR BLD AUTO: 15 %
NEUTROPHILS # BLD AUTO: 5.24 X10*3/UL (ref 1.2–7.7)
NEUTROPHILS NFR BLD AUTO: 52.9 %
NRBC BLD-RTO: 3 /100 WBCS (ref 0–0)
P AXIS: 43 DEGREES
P OFFSET: 183 MS
P ONSET: 127 MS
PAPPENHEIMER BOD BLD QL SMEAR: PRESENT
PLATELET # BLD AUTO: 294 X10*3/UL (ref 150–450)
POLYCHROMASIA BLD QL SMEAR: NORMAL
POTASSIUM SERPL-SCNC: 4.1 MMOL/L (ref 3.5–5.3)
PR INTERVAL: 182 MS
PROT SERPL-MCNC: 6.9 G/DL (ref 6.4–8.2)
Q ONSET: 218 MS
QRS COUNT: 12 BEATS
QRS DURATION: 106 MS
QT INTERVAL: 374 MS
QTC CALCULATION(BAZETT): 403 MS
QTC FREDERICIA: 393 MS
R AXIS: 52 DEGREES
RBC # BLD AUTO: 2.76 X10*6/UL (ref 4.5–5.9)
RBC MORPH BLD: NORMAL
RETICS #: 0.54 X10*6/UL (ref 0.02–0.12)
RETICS/RBC NFR AUTO: 20.1 % (ref 0.5–2)
SCHISTOCYTES BLD QL SMEAR: NORMAL
SICKLE CELLS BLD QL SMEAR: NORMAL
SODIUM SERPL-SCNC: 137 MMOL/L (ref 136–145)
T AXIS: 15 DEGREES
T OFFSET: 405 MS
VENTRICULAR RATE: 70 BPM
WBC # BLD AUTO: 9.9 X10*3/UL (ref 4.4–11.3)

## 2024-06-23 PROCEDURE — 36415 COLL VENOUS BLD VENIPUNCTURE: CPT | Performed by: NURSE PRACTITIONER

## 2024-06-23 PROCEDURE — 2500000001 HC RX 250 WO HCPCS SELF ADMINISTERED DRUGS (ALT 637 FOR MEDICARE OP)

## 2024-06-23 PROCEDURE — 83735 ASSAY OF MAGNESIUM: CPT | Performed by: NURSE PRACTITIONER

## 2024-06-23 PROCEDURE — 2500000004 HC RX 250 GENERAL PHARMACY W/ HCPCS (ALT 636 FOR OP/ED)

## 2024-06-23 PROCEDURE — 80053 COMPREHEN METABOLIC PANEL: CPT | Performed by: NURSE PRACTITIONER

## 2024-06-23 PROCEDURE — 99233 SBSQ HOSP IP/OBS HIGH 50: CPT

## 2024-06-23 PROCEDURE — 83615 LACTATE (LD) (LDH) ENZYME: CPT | Performed by: NURSE PRACTITIONER

## 2024-06-23 PROCEDURE — 1170000001 HC PRIVATE ONCOLOGY ROOM DAILY

## 2024-06-23 PROCEDURE — 85045 AUTOMATED RETICULOCYTE COUNT: CPT | Performed by: NURSE PRACTITIONER

## 2024-06-23 PROCEDURE — 85025 COMPLETE CBC W/AUTO DIFF WBC: CPT | Performed by: NURSE PRACTITIONER

## 2024-06-23 RX ORDER — OXYCODONE HYDROCHLORIDE 5 MG/1
15 TABLET ORAL EVERY 4 HOURS PRN
Status: DISCONTINUED | OUTPATIENT
Start: 2024-06-23 | End: 2024-06-24

## 2024-06-23 RX ORDER — NAPROXEN 500 MG/1
500 TABLET ORAL 2 TIMES DAILY PRN
Status: DISCONTINUED | OUTPATIENT
Start: 2024-06-23 | End: 2024-06-25 | Stop reason: HOSPADM

## 2024-06-23 RX ORDER — HYDROMORPHONE HYDROCHLORIDE 1 MG/ML
1 INJECTION, SOLUTION INTRAMUSCULAR; INTRAVENOUS; SUBCUTANEOUS ONCE
Status: COMPLETED | OUTPATIENT
Start: 2024-06-23 | End: 2024-06-23

## 2024-06-23 ASSESSMENT — PAIN SCALES - GENERAL
PAINLEVEL_OUTOF10: 10 - WORST POSSIBLE PAIN
PAINLEVEL_OUTOF10: 9
PAINLEVEL_OUTOF10: 10 - WORST POSSIBLE PAIN
PAINLEVEL_OUTOF10: 8
PAINLEVEL_OUTOF10: 10 - WORST POSSIBLE PAIN
PAINLEVEL_OUTOF10: 9
PAINLEVEL_OUTOF10: 10 - WORST POSSIBLE PAIN
PAINLEVEL_OUTOF10: 10 - WORST POSSIBLE PAIN
PAINLEVEL_OUTOF10: 9
PAINLEVEL_OUTOF10: 9

## 2024-06-23 ASSESSMENT — PAIN - FUNCTIONAL ASSESSMENT
PAIN_FUNCTIONAL_ASSESSMENT: 0-10

## 2024-06-23 ASSESSMENT — COGNITIVE AND FUNCTIONAL STATUS - GENERAL
DAILY ACTIVITIY SCORE: 24
DAILY ACTIVITIY SCORE: 24
MOBILITY SCORE: 24
MOBILITY SCORE: 24

## 2024-06-23 NOTE — CARE PLAN
The patient's goals for the shift include      The clinical goals for the shift include Pt will have decreased pain through end of shift 6/23/2024 1900    Problem: Pain - Adult  Goal: Verbalizes/displays adequate comfort level or baseline comfort level  Outcome: Progressing     Problem: Safety - Adult  Goal: Free from fall injury  Outcome: Progressing     Problem: Discharge Planning  Goal: Discharge to home or other facility with appropriate resources  Outcome: Progressing     Problem: Chronic Conditions and Co-morbidities  Goal: Patient's chronic conditions and co-morbidity symptoms are monitored and maintained or improved  Outcome: Progressing     Problem: Skin  Goal: Decreased wound size/increased tissue granulation at next dressing change  Outcome: Progressing  Goal: Participates in plan/prevention/treatment measures  Outcome: Progressing  Goal: Prevent/manage excess moisture  Outcome: Progressing  Goal: Prevent/minimize sheer/friction injuries  Outcome: Progressing  Goal: Promote/optimize nutrition  Outcome: Progressing  Goal: Promote skin healing  Outcome: Progressing     Problem: Fall/Injury  Goal: Not fall by end of shift  Outcome: Progressing  Goal: Be free from injury by end of the shift  Outcome: Progressing  Goal: Verbalize understanding of personal risk factors for fall in the hospital  Outcome: Progressing  Goal: Verbalize understanding of risk factor reduction measures to prevent injury from fall in the home  Outcome: Progressing  Goal: Use assistive devices by end of the shift  Outcome: Progressing  Goal: Pace activities to prevent fatigue by end of the shift  Outcome: Progressing

## 2024-06-23 NOTE — CARE PLAN
Problem: Pain - Adult  Goal: Verbalizes/displays adequate comfort level or baseline comfort level  Outcome: Progressing     Problem: Safety - Adult  Goal: Free from fall injury  Outcome: Progressing     Problem: Discharge Planning  Goal: Discharge to home or other facility with appropriate resources  Outcome: Progressing     Problem: Chronic Conditions and Co-morbidities  Goal: Patient's chronic conditions and co-morbidity symptoms are monitored and maintained or improved  Outcome: Progressing     Problem: Skin  Goal: Decreased wound size/increased tissue granulation at next dressing change  Outcome: Progressing  Goal: Participates in plan/prevention/treatment measures  Outcome: Progressing  Goal: Prevent/manage excess moisture  Outcome: Progressing  Goal: Prevent/minimize sheer/friction injuries  Outcome: Progressing  Goal: Promote/optimize nutrition  Outcome: Progressing  Goal: Promote skin healing  Outcome: Progressing     Problem: Fall/Injury  Goal: Not fall by end of shift  Outcome: Progressing  Goal: Be free from injury by end of the shift  Outcome: Progressing  Goal: Verbalize understanding of personal risk factors for fall in the hospital  Outcome: Progressing  Goal: Verbalize understanding of risk factor reduction measures to prevent injury from fall in the home  Outcome: Progressing  Goal: Use assistive devices by end of the shift  Outcome: Progressing  Goal: Pace activities to prevent fatigue by end of the shift  Outcome: Progressing       The clinical goals for the shift include pt will rate pain less than 8/10 throughout shift    Over the shift, the patient remained safe and free from injury. Pain in R rib cage/chest area, medicated PRN, did require breakthrough dose overnight. Vitals stable throughout shift. No acute events overnight

## 2024-06-23 NOTE — NURSING NOTE
Raghav consulted by bedside RN for PIV placement. Raghav RN to bedside to assess. Scanned patient's upper extremities via ultrasound, no vessels noted for IV cannulation. Bedside RN made aware, patient will need alternative access.

## 2024-06-23 NOTE — NURSING NOTE
Pt satting 85% RA while sitting on side of bed, recovered to 92% on 2L NC while sitting on side of bed

## 2024-06-23 NOTE — PROGRESS NOTES
"Joellen Narayan is a 23 y.o. male on day 5 of admission presenting with Sickle cell crisis (Multi).    Subjective   Pt seen at bedside this AM, able to answer questions and oriented but drowsier on exam compared to yesterday. We discussed plan to rotate PO/IV pain medicine today and going down on the IV dilaudid dose in anticipation for homegoing tomorrow. He is agreeable for discharge tomorrow. Additionally, we discussed attempting to wean off oxygen today. Provider brought IS tool to room and instructed pt, he was able to perform appropriate IS. Encouraged aggressive IS today.     Denies fever, chills, chest pain, SOB, abd pain, nausea, vomiting, diarrhea, constipation, edema, or bleeding.        Objective     Physical Exam  Constitutional:       Comments: drowsy   HENT:      Head: Normocephalic.      Right Ear: External ear normal.      Left Ear: External ear normal.      Nose: Nose normal.   Eyes:      General: Scleral icterus present.      Extraocular Movements: Extraocular movements intact.   Cardiovascular:      Rate and Rhythm: Normal rate and regular rhythm.   Pulmonary:      Effort: Pulmonary effort is normal.      Comments: Diminished throughout all lung fields   Abdominal:      General: Bowel sounds are normal.      Palpations: Abdomen is soft.   Genitourinary:     Comments: Mild penile edema, no erythema or drainage noted  Musculoskeletal:         General: Normal range of motion.      Cervical back: Normal range of motion and neck supple.   Skin:     General: Skin is warm and dry.   Neurological:      General: No focal deficit present.      Mental Status: He is oriented to person, place, and time.   Psychiatric:         Mood and Affect: Mood normal.         Behavior: Behavior normal.         Thought Content: Thought content normal.         Last Recorded Vitals  Blood pressure 124/74, pulse 69, temperature 36.5 °C (97.7 °F), temperature source Temporal, resp. rate 18, height 1.88 m (6' 2\"), weight 68 kg " (150 lb), SpO2 95%.  Intake/Output last 3 Shifts:  I/O last 3 completed shifts:  In: 531 (7.8 mL/kg) [P.O.:250; Blood:281]  Out: 400 (5.9 mL/kg) [Urine:400 (0.2 mL/kg/hr)]  Weight: 68 kg     Relevant Results    .Scheduled medications  enoxaparin, 40 mg, subcutaneous, q24h  folic acid, 1 mg, oral, Daily  lidocaine, 30 mL, infiltration, Once  pantoprazole, 40 mg, oral, Daily before breakfast  phenylephrine in NS, 1,000 mcg, intracavernosal, Once  polyethylene glycol, 17 g, oral, Daily  sennosides-docusate sodium, 2 tablet, oral, q12h      Continuous medications     PRN medications  PRN medications: acetaminophen, diphenhydrAMINE, HYDROmorphone, ondansetron, oxyCODONE, pseudoephedrine, sodium chloride    .  Results for orders placed or performed during the hospital encounter of 06/18/24 (from the past 24 hour(s))   POCT GLUCOSE   Result Value Ref Range    POCT Glucose 83 74 - 99 mg/dL   CBC and Auto Differential   Result Value Ref Range    WBC 9.9 4.4 - 11.3 x10*3/uL    nRBC 3.0 (H) 0.0 - 0.0 /100 WBCs    RBC 2.76 (L) 4.50 - 5.90 x10*6/uL    Hemoglobin 8.6 (L) 13.5 - 17.5 g/dL    Hematocrit 23.4 (L) 41.0 - 52.0 %    MCV 85 80 - 100 fL    MCH 31.2 26.0 - 34.0 pg    MCHC 36.8 (H) 32.0 - 36.0 g/dL    RDW 22.9 (H) 11.5 - 14.5 %    Platelets 294 150 - 450 x10*3/uL    Neutrophils % 52.9 40.0 - 80.0 %    Immature Granulocytes %, Automated 0.9 0.0 - 0.9 %    Lymphocytes % 22.1 13.0 - 44.0 %    Monocytes % 15.0 2.0 - 10.0 %    Eosinophils % 8.8 0.0 - 6.0 %    Basophils % 0.3 0.0 - 2.0 %    Neutrophils Absolute 5.24 1.20 - 7.70 x10*3/uL    Immature Granulocytes Absolute, Automated 0.09 0.00 - 0.70 x10*3/uL    Lymphocytes Absolute 2.19 1.20 - 4.80 x10*3/uL    Monocytes Absolute 1.49 (H) 0.10 - 1.00 x10*3/uL    Eosinophils Absolute 0.87 (H) 0.00 - 0.70 x10*3/uL    Basophils Absolute 0.03 0.00 - 0.10 x10*3/uL   Comprehensive Metabolic Panel   Result Value Ref Range    Glucose 79 74 - 99 mg/dL    Sodium 137 136 - 145 mmol/L     Potassium 4.1 3.5 - 5.3 mmol/L    Chloride 100 98 - 107 mmol/L    Bicarbonate 30 21 - 32 mmol/L    Anion Gap 11 10 - 20 mmol/L    Urea Nitrogen 6 6 - 23 mg/dL    Creatinine 0.47 (L) 0.50 - 1.30 mg/dL    eGFR >90 >60 mL/min/1.73m*2    Calcium 9.5 8.6 - 10.6 mg/dL    Albumin 4.3 3.4 - 5.0 g/dL    Alkaline Phosphatase 128 (H) 33 - 120 U/L    Total Protein 6.9 6.4 - 8.2 g/dL    AST 70 (H) 9 - 39 U/L    Bilirubin, Total 6.5 (H) 0.0 - 1.2 mg/dL    ALT 39 10 - 52 U/L   Magnesium   Result Value Ref Range    Magnesium 2.40 1.60 - 2.40 mg/dL   Lactate dehydrogenase   Result Value Ref Range     (H) 84 - 246 U/L   Reticulocytes   Result Value Ref Range    Retic % 20.1 (H) 0.5 - 2.0 %    Retic Absolute 0.537 (H) 0.022 - 0.118 x10*6/uL    Reticulocyte Hemoglobin 29 28 - 38 pg    Immature Retic fraction 13.9 <=16.0 %   Morphology   Result Value Ref Range    RBC Morphology See Below     Polychromasia Mild     RBC Fragments Few     Sickle Cells Many     Pappenheimer Bodies Present          Assessment/Plan   Principal Problem:    Sickle cell crisis (Multi)    Joellen Narayan is a 23 y.o. 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 Of note, pt was hypoxic on admission, placed on 2L supp O2, 6/23 encouraged aggressive IS prior to walking pulse ox prior to homegoing. 6/18 CXR w/o evidence of acute infectious process. Afebrile, VSS, (6/21) repeat CXR w/o evidence of acute process, low c/f ACS at this time. (6/18) Urology consulted in ED for priapism, s/p corporal aspiration was attempted in the ED without  return of blood. Priapism resolving with O2, IVF, and sudafed PRN. ACS consulted in ED (6/18) for poss pneumoperitoneum seen on CXR, (6/18) CT CAP showed no evidence of pneumoperitoneum and ACS rec no surgical interventions. Hematology consulted in ED (6/18) rec 1u pRBC for Hgb 6.6. Admitted for further management. (6/21) Hg 6.6, s/p x1u pRBC with improvement to 7.5. Started on IVP dilaudid for sickle cell pain management (6/18-6/23), switched to rotating PO/IV (6/23) for homegoing. DC home pending improvement in pain, likely 6/24.      # Hgb SS with acute on chronic severe pain  - [place]AR[place] reviewed, no aberrancies (last filled 2/29 oxycodone 20 tabs x5 days)   - No current care path  - Hgb baseline ~8.5, Hgb 6.6 (6/18)--> s/p 1unit PRBC--> Hgb 7.7 (6/19)--> 7.3 (6/20)  - Tbili baseline fluctuates ~5-13, Tbili 9.8 (6/18)--> 8.6 (6/19)--> 10.1 (6/20); will continue to monitor  - LDH baseline fluctuates but ~500,  (6/18)--> 662 (6/19)--> 609 (6/23)  - Leukocytosis noted on admission (6/18 WBC 15.2--> improved to 12.2 as of 6/19--> 9.9 (6/23)), likely reactive as pt afebrile with no s/sx of infectious process  - Pt with CP and hypoxia with elevated Hgb S, however these are all consistent with pt's baseline presentation of sickle cell crisis. In the absence of imaging findings c/f infection or ACS, no SOB/wheezing, and no fevers-- low c/f ACS at this time, however will monitor closely  - UA (6/18) negative  - Troponin (6/18) 4  - EKG (6/18) NSR  - Hematology consulted in ED (6/18) rec 1u pRBC and obtaining Hgb S, signed off 6/19  - 6/21 additional 1u PRBC given for hgb 6.6 --> improvement (6/23) Hg 8.6   - s/p IV dilaudid 3mg q2hrs PRN severe pain (6/18- 6/23)  - 6/23 Switched to oxycodone 15mg q4 PRN severe pain and 2mg IVP dilaudid q4 PRN breakthrough for anticipation homegoing   - s/p IV Toradol 30mg q6hrs x3 days (6/19- plan stop 6/21) with Protonix for PPI prophy  - c/w home folic acid 1mg daily  - Hgb S  (6/18): 94.5%; Hg S (6/20) 77.3%   - Utox (6/18): +opiates, +cannabinoid  - 6/21 repeat CXR d/t increased CP w/o evidence for acute process   - PO Zofran PRN for opioid-induced nausea, PO Benadryl PRN for opioid-induced pruritus, Bowel regimen for opioid-induced constipation with DocuSenna 2tabs BID and Miralax daily (LBM 6/22)      # priapism, improving   - 6/18 pt presented to the ED with priapism likely recurrent ischemic given hx of SCD and lymphoma   - s/p corporal aspiration was attempted in the ED at the 2 o'clock position near the base without return of blood  - Urology consulted (6/18) rec no acute interventions necessary as there was improvement with conservative management, 6/19 okay to discharge from urology perspective   - Start 30mg Sudafed q4h PRN, 6/21 increased to 60mg q6 for persistent mild priapism - reports improving 6/21-6/22 but some edema noted at area where they tapped and generalized, he states he had multiple attempts at taps while in the ED  - 6/22 Rec ice packs and elevation, if worsening can obtain imaging -- 6/23 reports interventions have helped a lot but still feels some soreness   - New urology FUV 7/2     # Initial c/f pneumoperitoneum-- ruled out  - CXR (6/18) no acute cardiopulmonary process, [place]-shaped lucency under the left hemidiaphragm new from the previous examination. No lucency under the right hemidiaphragm seen. This favors air-fluid level with a somewhat atypical appearance of the gastric bubble. Correlate with any abdominal symptomatology to the need for dedicated abdominal radiographs.   - CT C/A/P (6/18) No evidence of pneumoperitoneum, Moderate distention of the stomach and colon which appears similar to 02/16/2024. Moderate distention of the urinary bladder and mild prominence of the ureters. Please correlate for urinary retention. Skin thickening of the visualized penis. Correlation with physical examination is suggested. No acute cardiopulmonary process.  Debris within the esophageal lumen which places the patient at  increased risk of aspiration.  - ACS consulted in ED (6/18) for c/f free air under the diaphragm, rec CT A/P reviewed which showed no free air noted, stomach appears grossly distended, which likely led to the perception of free air on CXR (of note, the patient mentioned he had a meal ~1 hr prior to examination), rec no surgical interventions indicated, signed off 6/18      # hx of nocturnal oxygen dependence and hypoxia on room air  - Historically improves with oxygen supplementation  - Pt hypoxic on admission (SpO2 86% on RA), placed on 2L supp O2    - CXR and CT CAP (6/18) showed no signs of infectious process and pt denies SOB  - Has not had home oxygen for 2-3 years   - Pt did not qualify for home O2 per walking pulse ox performed during previous hospital admission on 2/26/24  - Missed pulm apt on 2/21/24 due to being admitted inpt  - Still on 2L NC as of 6/23, 6/23 encouraged aggressive incentive spirometry and poss walking pulse ox today vs tomorrow for homegoing - recent 6/21 CXR w/o evidence of acute process   - Will attempt wean 6/23     # Nodular lymphocyte predominant Hodgkins lymphoma (NLPHL)   - Primary Oncologist: Dr. Stoll  - Enlarged lymph nodes noted 4/1/22  - RUQ US (11/14/22) with mildly enlarged LNs in the region of the kavin hepatis  - MRI liver (11/16/22) showed re-demonstration of bulky retroperitoneal lymphadenopathy and kavin hepatic lymphadenopathy    - (11/18/22) lymph node biopsy showed atypical lymphoid infiltrate. Reviewed by Hemepath board, insufficient for lymphoma diagnosis  - PET/CT (12/6/22) showing retroperitoneal lymphadenopathy  - Followed up with Dr. Stoll (12/16/22) with plan for surg/onc consult for large tissue bx but patient missed apt and was lost to follow up  - Requested new apt with Dr. Ronnie Marte 6/19/23, patient was not seen and lost to follow up  - CT a/p (11/28/23) increased size of retroperitoneal  lymph nodes reflecting extramedullary hematopoiesis I/s/o sickle cell vs lymphoma  - Paraaortic LN bx via IR (11/30/23) consistent with NLPHL. Flow: no clonal B cell or T cell population identified, lymphocyte 95%, CD3+CD4+ 68%, CD3+CD8+ 7%, CD19+ 24%  - Elevated LDH/bili partially from sickle cell disease   - Chemotherapy (R-[facility]) was discussed with primary oncologist Dr. Stoll, and decision was made to simplify his chemotherapy to Rituxan and prednisone q3 weeks mainly due to frequent sickle cell crisis  - Current chemo regimen: rituxan and prednisone q3 weeks (C1 1/18/24, C2 2/8/24, Missed C3 d/t sickle cell pain crisis, C4 4/4/24, C5 5/16/24, C6 6/7/24)  - Per pt no longer taking Acyclovir 400mg BID prophy          # prophy  - Lovenox subcutaneous, SCDs, encourage ambulation  - PPI daily      DISPO:  - Full code, confirmed on admit  - DC home pending improvement in pain, likely Mon 6/24   - NOK: Melissa Narayan (Parent) [phone]  - FUV Urology 7/2, PET/CT 7/15, Dr. Stoll 7/25, Sickle cell requested     I spent > 60 minutes in the professional and overall care of this patient.     Assessment and plan discussed with attending physician Dr. Ochoa.        Evelyne Matt, APRN-CNP

## 2024-06-24 ENCOUNTER — APPOINTMENT (OUTPATIENT)
Dept: RADIOLOGY | Facility: HOSPITAL | Age: 24
DRG: 812 | End: 2024-06-24
Payer: COMMERCIAL

## 2024-06-24 LAB
ALBUMIN SERPL BCP-MCNC: 5 G/DL (ref 3.4–5)
ALP SERPL-CCNC: 150 U/L (ref 33–120)
ALT SERPL W P-5'-P-CCNC: 52 U/L (ref 10–52)
ANION GAP SERPL CALC-SCNC: 15 MMOL/L (ref 10–20)
AST SERPL W P-5'-P-CCNC: 84 U/L (ref 9–39)
BASO STIPL BLD QL SMEAR: PRESENT
BASOPHILS # BLD AUTO: 0.04 X10*3/UL (ref 0–0.1)
BASOPHILS NFR BLD AUTO: 0.4 %
BILIRUB SERPL-MCNC: 7.5 MG/DL (ref 0–1.2)
BUN SERPL-MCNC: 8 MG/DL (ref 6–23)
CALCIUM SERPL-MCNC: 10.3 MG/DL (ref 8.6–10.6)
CHLORIDE SERPL-SCNC: 99 MMOL/L (ref 98–107)
CO2 SERPL-SCNC: 27 MMOL/L (ref 21–32)
CREAT SERPL-MCNC: 0.54 MG/DL (ref 0.5–1.3)
EGFRCR SERPLBLD CKD-EPI 2021: >90 ML/MIN/1.73M*2
EOSINOPHIL # BLD AUTO: 0.76 X10*3/UL (ref 0–0.7)
EOSINOPHIL NFR BLD AUTO: 6.9 %
ERYTHROCYTE [DISTWIDTH] IN BLOOD BY AUTOMATED COUNT: 20.7 % (ref 11.5–14.5)
GLUCOSE SERPL-MCNC: 67 MG/DL (ref 74–99)
HAV IGM SER QL: NONREACTIVE
HBV CORE IGM SER QL: NONREACTIVE
HBV SURFACE AG SERPL QL IA: NONREACTIVE
HCT VFR BLD AUTO: 24.2 % (ref 41–52)
HCV AB SER QL: NONREACTIVE
HEMOGLOBIN A2: 3.6 % (ref 2–3.5)
HEMOGLOBIN A: 17.1 % (ref 95.8–98)
HEMOGLOBIN F: 2 % (ref 0–2)
HEMOGLOBIN IDENTIFICATION INTERPRETATION: ABNORMAL
HEMOGLOBIN S: 77.3 %
HGB BLD-MCNC: 9 G/DL (ref 13.5–17.5)
HGB RETIC QN: 29 PG (ref 28–38)
HOWELL-JOLLY BOD BLD QL SMEAR: PRESENT
HYPOCHROMIA BLD QL SMEAR: NORMAL
IMM GRANULOCYTES # BLD AUTO: 0.11 X10*3/UL (ref 0–0.7)
IMM GRANULOCYTES NFR BLD AUTO: 1 % (ref 0–0.9)
IMMATURE RETIC FRACTION: 14.3 %
LDH SERPL L TO P-CCNC: 774 U/L (ref 84–246)
LYMPHOCYTES # BLD AUTO: 2.18 X10*3/UL (ref 1.2–4.8)
LYMPHOCYTES NFR BLD AUTO: 19.7 %
MAGNESIUM SERPL-MCNC: 2.31 MG/DL (ref 1.6–2.4)
MCH RBC QN AUTO: 31 PG (ref 26–34)
MCHC RBC AUTO-ENTMCNC: 37.2 G/DL (ref 32–36)
MCV RBC AUTO: 83 FL (ref 80–100)
MONOCYTES # BLD AUTO: 1.74 X10*3/UL (ref 0.1–1)
MONOCYTES NFR BLD AUTO: 15.7 %
NEUTROPHILS # BLD AUTO: 6.25 X10*3/UL (ref 1.2–7.7)
NEUTROPHILS NFR BLD AUTO: 56.3 %
NRBC BLD-RTO: 2.3 /100 WBCS (ref 0–0)
PAPPENHEIMER BOD BLD QL SMEAR: PRESENT
PATH REVIEW-HGB IDENTIFICATION: ABNORMAL
PLATELET # BLD AUTO: 278 X10*3/UL (ref 150–450)
POLYCHROMASIA BLD QL SMEAR: NORMAL
POTASSIUM SERPL-SCNC: 4.6 MMOL/L (ref 3.5–5.3)
PROT SERPL-MCNC: 7.7 G/DL (ref 6.4–8.2)
RBC # BLD AUTO: 2.9 X10*6/UL (ref 4.5–5.9)
RBC MORPH BLD: NORMAL
RETICS #: 0.51 X10*6/UL (ref 0.02–0.12)
RETICS/RBC NFR AUTO: 17.7 % (ref 0.5–2)
SICKLE CELLS BLD QL SMEAR: NORMAL
SODIUM SERPL-SCNC: 136 MMOL/L (ref 136–145)
TARGETS BLD QL SMEAR: NORMAL
WBC # BLD AUTO: 11.1 X10*3/UL (ref 4.4–11.3)

## 2024-06-24 PROCEDURE — 83735 ASSAY OF MAGNESIUM: CPT | Performed by: NURSE PRACTITIONER

## 2024-06-24 PROCEDURE — 36415 COLL VENOUS BLD VENIPUNCTURE: CPT | Performed by: NURSE PRACTITIONER

## 2024-06-24 PROCEDURE — 2500000004 HC RX 250 GENERAL PHARMACY W/ HCPCS (ALT 636 FOR OP/ED)

## 2024-06-24 PROCEDURE — 76705 ECHO EXAM OF ABDOMEN: CPT | Performed by: RADIOLOGY

## 2024-06-24 PROCEDURE — 76705 ECHO EXAM OF ABDOMEN: CPT

## 2024-06-24 PROCEDURE — 99233 SBSQ HOSP IP/OBS HIGH 50: CPT

## 2024-06-24 PROCEDURE — 2500000001 HC RX 250 WO HCPCS SELF ADMINISTERED DRUGS (ALT 637 FOR MEDICARE OP)

## 2024-06-24 PROCEDURE — 85025 COMPLETE CBC W/AUTO DIFF WBC: CPT | Performed by: NURSE PRACTITIONER

## 2024-06-24 PROCEDURE — 36415 COLL VENOUS BLD VENIPUNCTURE: CPT

## 2024-06-24 PROCEDURE — 1170000001 HC PRIVATE ONCOLOGY ROOM DAILY

## 2024-06-24 PROCEDURE — 80074 ACUTE HEPATITIS PANEL: CPT

## 2024-06-24 PROCEDURE — 80053 COMPREHEN METABOLIC PANEL: CPT | Performed by: NURSE PRACTITIONER

## 2024-06-24 PROCEDURE — 85045 AUTOMATED RETICULOCYTE COUNT: CPT | Performed by: NURSE PRACTITIONER

## 2024-06-24 PROCEDURE — 83615 LACTATE (LD) (LDH) ENZYME: CPT | Performed by: NURSE PRACTITIONER

## 2024-06-24 ASSESSMENT — COGNITIVE AND FUNCTIONAL STATUS - GENERAL
MOBILITY SCORE: 24
DAILY ACTIVITIY SCORE: 24
MOBILITY SCORE: 24
DAILY ACTIVITIY SCORE: 24

## 2024-06-24 ASSESSMENT — PAIN SCALES - GENERAL
PAINLEVEL_OUTOF10: 8
PAINLEVEL_OUTOF10: 9
PAINLEVEL_OUTOF10: 9
PAINLEVEL_OUTOF10: 10 - WORST POSSIBLE PAIN
PAINLEVEL_OUTOF10: 8
PAINLEVEL_OUTOF10: 9
PAINLEVEL_OUTOF10: 7
PAINLEVEL_OUTOF10: 9
PAINLEVEL_OUTOF10: 10 - WORST POSSIBLE PAIN
PAINLEVEL_OUTOF10: 9
PAINLEVEL_OUTOF10: 9
PAINLEVEL_OUTOF10: 10 - WORST POSSIBLE PAIN
PAINLEVEL_OUTOF10: 10 - WORST POSSIBLE PAIN
PAINLEVEL_OUTOF10: 9
PAINLEVEL_OUTOF10: 9
PAINLEVEL_OUTOF10: 7
PAINLEVEL_OUTOF10: 9
PAINLEVEL_OUTOF10: 10 - WORST POSSIBLE PAIN
PAINLEVEL_OUTOF10: 8
PAINLEVEL_OUTOF10: 8
PAINLEVEL_OUTOF10: 9

## 2024-06-24 ASSESSMENT — PAIN DESCRIPTION - LOCATION
LOCATION: RIB CAGE

## 2024-06-24 ASSESSMENT — PAIN - FUNCTIONAL ASSESSMENT
PAIN_FUNCTIONAL_ASSESSMENT: 0-10

## 2024-06-24 ASSESSMENT — PAIN DESCRIPTION - ORIENTATION: ORIENTATION: RIGHT

## 2024-06-24 ASSESSMENT — PAIN SCALES - PAIN ASSESSMENT IN ADVANCED DEMENTIA (PAINAD): TOTALSCORE: MEDICATION (SEE MAR)

## 2024-06-24 NOTE — PROGRESS NOTES
"Joellen Narayan is a 23 y.o. male on day 6 of admission presenting with Sickle cell crisis (Multi).    Subjective   Joellen is doing fine this morning. He states his pain is worse today and that he wasn't ready to wean yesterday. Pt also states he doesn't always wean prior to discharge. We discussed increasing his pain meds back to just IV prn today with plans to go home tomorrow, pt agreed to this plan.     Denies fever, chills, chest pain, SOB, abd pain, nausea, vomiting, diarrhea, constipation, edema, or bleeding.        Objective     Physical Exam  Constitutional:       Comments: drowsy   HENT:      Head: Normocephalic.      Right Ear: External ear normal.      Left Ear: External ear normal.      Nose: Nose normal.   Eyes:      General: Scleral icterus present.      Extraocular Movements: Extraocular movements intact.   Cardiovascular:      Rate and Rhythm: Normal rate and regular rhythm.   Pulmonary:      Effort: Pulmonary effort is normal.      Comments: Diminished throughout all lung fields   Abdominal:      General: Bowel sounds are normal.      Palpations: Abdomen is soft.   Genitourinary:     Comments: Mild penile edema, no erythema or drainage noted  Musculoskeletal:         General: Normal range of motion.      Cervical back: Normal range of motion and neck supple.   Skin:     General: Skin is warm and dry.   Neurological:      General: No focal deficit present.      Mental Status: He is oriented to person, place, and time.   Psychiatric:         Mood and Affect: Mood normal.         Behavior: Behavior normal.         Thought Content: Thought content normal.         Last Recorded Vitals  Blood pressure 118/71, pulse 62, temperature 36.4 °C (97.5 °F), temperature source Temporal, resp. rate 18, height 1.88 m (6' 2\"), weight 68 kg (150 lb), SpO2 92%.  Intake/Output last 3 Shifts:  I/O last 3 completed shifts:  In: 950 (14 mL/kg) [P.O.:950]  Out: 0 (0 mL/kg)   Weight: 68 kg     Relevant " Results    .Scheduled medications  enoxaparin, 40 mg, subcutaneous, q24h  folic acid, 1 mg, oral, Daily  lidocaine, 30 mL, infiltration, Once  pantoprazole, 40 mg, oral, Daily before breakfast  phenylephrine in NS, 1,000 mcg, intracavernosal, Once  polyethylene glycol, 17 g, oral, Daily  sennosides-docusate sodium, 2 tablet, oral, q12h      Continuous medications     PRN medications  PRN medications: diphenhydrAMINE, HYDROmorphone, naproxen, ondansetron, pseudoephedrine, sodium chloride    .  Results for orders placed or performed during the hospital encounter of 06/18/24 (from the past 24 hour(s))   CBC and Auto Differential   Result Value Ref Range    WBC 11.1 4.4 - 11.3 x10*3/uL    nRBC 2.3 (H) 0.0 - 0.0 /100 WBCs    RBC 2.90 (L) 4.50 - 5.90 x10*6/uL    Hemoglobin 9.0 (L) 13.5 - 17.5 g/dL    Hematocrit 24.2 (L) 41.0 - 52.0 %    MCV 83 80 - 100 fL    MCH 31.0 26.0 - 34.0 pg    MCHC 37.2 (H) 32.0 - 36.0 g/dL    RDW 20.7 (H) 11.5 - 14.5 %    Platelets 278 150 - 450 x10*3/uL    Neutrophils %      Immature Granulocytes %, Automated      Lymphocytes %      Monocytes %      Eosinophils %      Basophils %      Neutrophils Absolute      Lymphocytes Absolute      Monocytes Absolute      Eosinophils Absolute      Basophils Absolute     Reticulocytes   Result Value Ref Range    Retic % 17.7 (H) 0.5 - 2.0 %    Retic Absolute 0.513 (H) 0.022 - 0.118 x10*6/uL    Reticulocyte Hemoglobin 29 28 - 38 pg    Immature Retic fraction 14.3 <=16.0 %   Comprehensive Metabolic Panel   Result Value Ref Range    Glucose 67 (L) 74 - 99 mg/dL    Sodium 136 136 - 145 mmol/L    Potassium 4.6 3.5 - 5.3 mmol/L    Chloride 99 98 - 107 mmol/L    Bicarbonate 27 21 - 32 mmol/L    Anion Gap 15 10 - 20 mmol/L    Urea Nitrogen 8 6 - 23 mg/dL    Creatinine 0.54 0.50 - 1.30 mg/dL    eGFR >90 >60 mL/min/1.73m*2    Calcium 10.3 8.6 - 10.6 mg/dL    Albumin 5.0 3.4 - 5.0 g/dL    Alkaline Phosphatase 150 (H) 33 - 120 U/L    Total Protein 7.7 6.4 - 8.2 g/dL     AST 84 (H) 9 - 39 U/L    Bilirubin, Total 7.5 (H) 0.0 - 1.2 mg/dL    ALT 52 10 - 52 U/L   Magnesium   Result Value Ref Range    Magnesium 2.31 1.60 - 2.40 mg/dL   Lactate dehydrogenase   Result Value Ref Range     (H) 84 - 246 U/L         Assessment/Plan   Principal Problem:    Sickle cell crisis (Multi)    Joellen Narayan is a 23 y.o. 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 Of note, pt was hypoxic on admission, placed on 2L supp O2, 6/23 encouraged aggressive IS prior to walking pulse ox prior to homegoing. 6/18 CXR w/o evidence of acute infectious process. Afebrile, VSS, (6/21) repeat CXR w/o evidence of acute process, low c/f ACS at this time. (6/18) Urology consulted in ED for priapism, s/p corporal aspiration was attempted in the ED without return of blood. Priapism resolving with O2, IVF, and sudafed PRN. ACS consulted in ED (6/18) for poss pneumoperitoneum seen on CXR, (6/18) CT CAP showed no evidence of pneumoperitoneum and ACS rec no surgical interventions. Hematology consulted in ED (6/18) rec 1u pRBC for Hgb 6.6. Admitted for further management. (6/21) Hg 6.6, s/p x1u pRBC with improvement to 7.5. Started on IVP dilaudid for sickle cell pain management (6/18-6/23), switched to rotating PO/IV (6/23) and increased back to IV PRN (6/24). 6/24 RUQ US and hepatitis panel pending for transaminitis. DC home pending improvement in pain    # Hgb SS with acute on chronic severe pain  - OARRS reviewed, no aberrancies (last filled 2/29 oxycodone 20 tabs x5 days)   - No current care  path  - Hgb baseline ~8.5, Hgb 6.6 (6/18)--> s/p 1unit PRBC--> Hgb 7.7 (6/19)--> 9.0 (6/24)  - Tbili baseline fluctuates ~5-13, Tbili 9.8 (6/18)--> 8.6 (6/19)--> 7.5 (6/24); will continue to monitor  - LDH baseline fluctuates but ~500,  (6/18)--> 662 (6/19)--> 774 (6/24)  - Leukocytosis noted on admission (6/18 WBC 15.2--> improved to 12.2 as of 6/19--> 11.1 (6/24), likely reactive as pt afebrile with no s/sx of infectious process  - Pt with CP and hypoxia with elevated Hgb S, however these are all consistent with pt's baseline presentation of sickle cell crisis. In the absence of imaging findings c/f infection or ACS, no SOB/wheezing, and no fevers-- low c/f ACS at this time, however will monitor closely  - UA (6/18) negative  - Troponin (6/18) 4  - EKG (6/18) NSR  - Hematology consulted in ED (6/18) rec 1u pRBC and obtaining Hgb S, signed off 6/19  - 6/21 additional 1u PRBC given for hgb 6.6 --> improvement (6/24) Hg 9.0   - s/p IV dilaudid 3mg q2hrs PRN severe pain (6/18- 6/23)  - 6/23 s/p Switched to oxycodone 15mg q4 PRN severe pain and 2mg IVP dilaudid q4 PRN breakthrough for anticipation homegoing   - 6/24 started IV dilaudid 2mg q2h PRN for worsening pain  - s/p IV Toradol 30mg q6hrs x3 days (6/19- plan stop 6/21) with Protonix for PPI prophy  - c/w home folic acid 1mg daily  - Hgb S (6/18): 94.5%; Hg S (6/20) 77.3%   - Utox (6/18): +opiates, +cannabinoid  - 6/21 repeat CXR d/t increased CP w/o evidence for acute process   - PO Zofran PRN for opioid-induced nausea, PO Benadryl PRN for opioid-induced pruritus, Bowel regimen for opioid-induced constipation with DocuSenna 2tabs BID and Miralax daily (LBM 6/22)   - 6/24 uptrending transaminitis noted, pt denies RUQ pain  - 6/24 RUQ US and hepatitis panel pending     # priapism, improving   - 6/18 pt presented to the ED with priapism likely recurrent ischemic given hx of SCD and lymphoma   - s/p corporal aspiration was attempted in the ED at the 2 o'clock  position near the base without return of blood  - Urology consulted (6/18) rec no acute interventions necessary as there was improvement with conservative management, 6/19 okay to discharge from urology perspective   - Start 30mg Sudafed q4h PRN, 6/21 increased to 60mg q6 for persistent mild priapism - reports improving 6/21-6/22 but some edema noted at area where they tapped and generalized, he states he had multiple attempts at taps while in the ED  - 6/22 Rec ice packs and elevation, if worsening can obtain imaging -- 6/23 reports interventions have helped a lot but still feels some soreness   - New urology FUV 7/2     # Initial c/f pneumoperitoneum-- ruled out  - CXR (6/18) no acute cardiopulmonary process, [place]-shaped lucency under the left hemidiaphragm new from the previous examination. No lucency under the right hemidiaphragm seen. This favors air-fluid level with a somewhat atypical appearance of the gastric bubble. Correlate with any abdominal symptomatology to the need for dedicated abdominal radiographs.   - CT C/A/P (6/18) No evidence of pneumoperitoneum, Moderate distention of the stomach and colon which appears similar to 02/16/2024. Moderate distention of the urinary bladder and mild prominence of the ureters. Please correlate for urinary retention. Skin thickening of the visualized penis. Correlation with physical examination is suggested. No acute cardiopulmonary process. Debris within the esophageal lumen which places the patient at  increased risk of aspiration.  - ACS consulted in ED (6/18) for c/f free air under the diaphragm, rec CT A/P reviewed which showed no free air noted, stomach appears grossly distended, which likely led to the perception of free air on CXR (of note, the patient mentioned he had a meal ~1 hr prior to examination), rec no surgical interventions indicated, signed off 6/18      # hx of nocturnal oxygen dependence and hypoxia on room air  - Historically improves with  oxygen supplementation  - Pt hypoxic on admission (SpO2 86% on RA), placed on 2L supp O2    - CXR and CT CAP (6/18) showed no signs of infectious process and pt denies SOB  - Has not had home oxygen for 2-3 years   - Pt did not qualify for home O2 per walking pulse ox performed during previous hospital admission on 2/26/24  - Missed pulm apt on 2/21/24 due to being admitted inpt  - Still on 2L NC as of 6/23, 6/23 encouraged aggressive incentive spirometry however pt failed walking pulse ox - recent 6/21 CXR w/o evidence of acute process   - 6/24 pt on RA satting well with no SOB - will repeat walking pulse ox prior to discharge    # Nodular lymphocyte predominant Hodgkins lymphoma (NLPHL)   - Primary Oncologist: Dr. Stoll  - Enlarged lymph nodes noted 4/1/22  - RUQ US (11/14/22) with mildly enlarged LNs in the region of the kavin hepatis  - MRI liver (11/16/22) showed re-demonstration of bulky retroperitoneal lymphadenopathy and kavin hepatic lymphadenopathy    - (11/18/22) lymph node biopsy showed atypical lymphoid infiltrate. Reviewed by Hemepath board, insufficient for lymphoma diagnosis  - PET/CT (12/6/22) showing retroperitoneal lymphadenopathy  - Followed up with Dr. Stoll (12/16/22) with plan for surg/onc consult for large tissue bx but patient missed apt and was lost to follow up  - Requested new apt with Dr. Ronnie Marte 6/19/23, patient was not seen and lost to follow up  - CT a/p (11/28/23) increased size of retroperitoneal lymph nodes reflecting extramedullary hematopoiesis I/s/o sickle cell vs lymphoma  - Paraaortic LN bx via IR (11/30/23) consistent with NLPHL. Flow: no clonal B cell or T cell population identified, lymphocyte 95%, CD3+CD4+ 68%, CD3+CD8+ 7%, CD19+ 24%  - Elevated LDH/bili partially from sickle cell disease   - Chemotherapy (R-CHOP) was discussed with primary oncologist Dr. Stoll, and decision was made to simplify his chemotherapy to Rituxan and prednisone q3 weeks mainly due to  frequent sickle cell crisis  - Current chemo regimen: rituxan and prednisone q3 weeks (C1 1/18/24, C2 2/8/24, Missed C3 d/t sickle cell pain crisis, C4 4/4/24, C5 5/16/24, C6 6/7/24)  - Per pt no longer taking Acyclovir 400mg BID prophy      # prophy  - Lovenox subcutaneous, SCDs, encourage ambulation  - PPI daily      DISPO:  - Full code, confirmed on admit  - DC home pending improvement in pain, likely Mon 6/24   - NOK: Melissa Narayan (Parent) [phone]  - FUV Urology 7/2, PET/CT 7/15, Dr. Stoll 7/25, Sickle cell 7/1     I spent > 60 minutes in the professional and overall care of this patient.     Assessment and plan discussed with attending physician Dr. Ochoa.      Nikki Couch PA-C

## 2024-06-24 NOTE — CARE PLAN
The patient's goals for the shift include      The clinical goals for the shift include pt will remain safe and free from falls and injuires throughout shift      Problem: Pain - Adult  Goal: Verbalizes/displays adequate comfort level or baseline comfort level  Outcome: Progressing     Problem: Safety - Adult  Goal: Free from fall injury  Outcome: Progressing     Problem: Skin  Goal: Decreased wound size/increased tissue granulation at next dressing change  Outcome: Progressing  Goal: Participates in plan/prevention/treatment measures  Outcome: Progressing  Goal: Prevent/manage excess moisture  Outcome: Progressing  Goal: Prevent/minimize sheer/friction injuries  Outcome: Progressing  Goal: Promote/optimize nutrition  Outcome: Progressing  Goal: Promote skin healing  Outcome: Progressing

## 2024-06-24 NOTE — CARE PLAN
The clinical goals for the shift include pt will remain safe and free from injury throughout shift 6/24/2024 0700      Problem: Pain - Adult  Goal: Verbalizes/displays adequate comfort level or baseline comfort level  Outcome: Progressing     Problem: Safety - Adult  Goal: Free from fall injury  Outcome: Progressing     Problem: Discharge Planning  Goal: Discharge to home or other facility with appropriate resources  Outcome: Progressing     Problem: Chronic Conditions and Co-morbidities  Goal: Patient's chronic conditions and co-morbidity symptoms are monitored and maintained or improved  Outcome: Progressing     Problem: Skin  Goal: Decreased wound size/increased tissue granulation at next dressing change  Outcome: Progressing  Goal: Participates in plan/prevention/treatment measures  Outcome: Progressing  Goal: Prevent/manage excess moisture  Outcome: Progressing  Goal: Prevent/minimize sheer/friction injuries  Outcome: Progressing  Goal: Promote/optimize nutrition  Outcome: Progressing  Goal: Promote skin healing  Outcome: Progressing     Problem: Fall/Injury  Goal: Not fall by end of shift  Outcome: Progressing  Goal: Be free from injury by end of the shift  Outcome: Progressing  Goal: Verbalize understanding of personal risk factors for fall in the hospital  Outcome: Progressing  Goal: Verbalize understanding of risk factor reduction measures to prevent injury from fall in the home  Outcome: Progressing  Goal: Use assistive devices by end of the shift  Outcome: Progressing  Goal: Pace activities to prevent fatigue by end of the shift  Outcome: Progressing

## 2024-06-24 NOTE — PROGRESS NOTES
06/24/24 1220   Discharge Planning   Who is requesting discharge planning? Provider   Home or Post Acute Services In home services   Type of Home Care Services DME or oxygen   Patient expects to be discharged to: home   Does the patient need discharge transport arranged? No     6/24/24 @ 1220  Patient may need to discharge home on new home oxygen. Walking pulse ox done yesterday and patient desatted to 85% on RA. Patient still having pain today. Per team, ADOD 6/25 and will repeat walking pulse ox prior to discharge. No home oxygen orders placed at this time.  Mari Ko RN TCC

## 2024-06-24 NOTE — DISCHARGE INSTRUCTIONS
For pain medication refills please call the sickle cell prescription line at [phone].  For scheduling follow up with sickle cell team call [phone].  If you feel your pain is uncontrolled at home, please call the sickle cell acute care clinic ( if you qualify)  Monday-Friday, 8a-2p at [phone].

## 2024-06-25 VITALS
SYSTOLIC BLOOD PRESSURE: 132 MMHG | OXYGEN SATURATION: 91 % | RESPIRATION RATE: 16 BRPM | HEART RATE: 66 BPM | WEIGHT: 150 LBS | BODY MASS INDEX: 19.25 KG/M2 | TEMPERATURE: 97.7 F | DIASTOLIC BLOOD PRESSURE: 76 MMHG | HEIGHT: 74 IN

## 2024-06-25 LAB
ALBUMIN SERPL BCP-MCNC: 4.9 G/DL (ref 3.4–5)
ALP SERPL-CCNC: 169 U/L (ref 33–120)
ALT SERPL W P-5'-P-CCNC: 55 U/L (ref 10–52)
ANION GAP SERPL CALC-SCNC: 16 MMOL/L (ref 10–20)
AST SERPL W P-5'-P-CCNC: 83 U/L (ref 9–39)
BASOPHILS # BLD AUTO: 0.05 X10*3/UL (ref 0–0.1)
BASOPHILS NFR BLD AUTO: 0.4 %
BILIRUB SERPL-MCNC: 9 MG/DL (ref 0–1.2)
BUN SERPL-MCNC: 9 MG/DL (ref 6–23)
CALCIUM SERPL-MCNC: 10.1 MG/DL (ref 8.6–10.6)
CHLORIDE SERPL-SCNC: 97 MMOL/L (ref 98–107)
CO2 SERPL-SCNC: 28 MMOL/L (ref 21–32)
CREAT SERPL-MCNC: 0.64 MG/DL (ref 0.5–1.3)
EGFRCR SERPLBLD CKD-EPI 2021: >90 ML/MIN/1.73M*2
EOSINOPHIL # BLD AUTO: 0.61 X10*3/UL (ref 0–0.7)
EOSINOPHIL NFR BLD AUTO: 5.4 %
ERYTHROCYTE [DISTWIDTH] IN BLOOD BY AUTOMATED COUNT: 19.8 % (ref 11.5–14.5)
GLUCOSE SERPL-MCNC: 65 MG/DL (ref 74–99)
HCT VFR BLD AUTO: 23.1 % (ref 41–52)
HGB BLD-MCNC: 8.4 G/DL (ref 13.5–17.5)
HGB RETIC QN: 30 PG (ref 28–38)
IMM GRANULOCYTES # BLD AUTO: 0.23 X10*3/UL (ref 0–0.7)
IMM GRANULOCYTES NFR BLD AUTO: 2 % (ref 0–0.9)
IMMATURE RETIC FRACTION: 16.2 %
LDH SERPL L TO P-CCNC: 804 U/L (ref 84–246)
LYMPHOCYTES # BLD AUTO: 2.79 X10*3/UL (ref 1.2–4.8)
LYMPHOCYTES NFR BLD AUTO: 24.6 %
MAGNESIUM SERPL-MCNC: 2.3 MG/DL (ref 1.6–2.4)
MCH RBC QN AUTO: 30.9 PG (ref 26–34)
MCHC RBC AUTO-ENTMCNC: 36.4 G/DL (ref 32–36)
MCV RBC AUTO: 85 FL (ref 80–100)
MONOCYTES # BLD AUTO: 2.09 X10*3/UL (ref 0.1–1)
MONOCYTES NFR BLD AUTO: 18.4 %
NEUTROPHILS # BLD AUTO: 5.58 X10*3/UL (ref 1.2–7.7)
NEUTROPHILS NFR BLD AUTO: 49.2 %
NRBC BLD-RTO: 2.3 /100 WBCS (ref 0–0)
PLATELET # BLD AUTO: 283 X10*3/UL (ref 150–450)
POTASSIUM SERPL-SCNC: 4.6 MMOL/L (ref 3.5–5.3)
PROT SERPL-MCNC: 7.8 G/DL (ref 6.4–8.2)
RBC # BLD AUTO: 2.72 X10*6/UL (ref 4.5–5.9)
RETICS #: 0.51 X10*6/UL (ref 0.02–0.12)
RETICS/RBC NFR AUTO: 18.9 % (ref 0.5–2)
SODIUM SERPL-SCNC: 136 MMOL/L (ref 136–145)
WBC # BLD AUTO: 11.4 X10*3/UL (ref 4.4–11.3)

## 2024-06-25 PROCEDURE — 83615 LACTATE (LD) (LDH) ENZYME: CPT | Performed by: NURSE PRACTITIONER

## 2024-06-25 PROCEDURE — 83735 ASSAY OF MAGNESIUM: CPT | Performed by: NURSE PRACTITIONER

## 2024-06-25 PROCEDURE — 2500000004 HC RX 250 GENERAL PHARMACY W/ HCPCS (ALT 636 FOR OP/ED)

## 2024-06-25 PROCEDURE — 36415 COLL VENOUS BLD VENIPUNCTURE: CPT | Performed by: NURSE PRACTITIONER

## 2024-06-25 PROCEDURE — 80053 COMPREHEN METABOLIC PANEL: CPT | Performed by: NURSE PRACTITIONER

## 2024-06-25 PROCEDURE — 85025 COMPLETE CBC W/AUTO DIFF WBC: CPT | Performed by: NURSE PRACTITIONER

## 2024-06-25 PROCEDURE — 99239 HOSP IP/OBS DSCHRG MGMT >30: CPT

## 2024-06-25 PROCEDURE — 2500000001 HC RX 250 WO HCPCS SELF ADMINISTERED DRUGS (ALT 637 FOR MEDICARE OP)

## 2024-06-25 PROCEDURE — 85045 AUTOMATED RETICULOCYTE COUNT: CPT | Performed by: NURSE PRACTITIONER

## 2024-06-25 ASSESSMENT — COGNITIVE AND FUNCTIONAL STATUS - GENERAL
MOBILITY SCORE: 24
DAILY ACTIVITIY SCORE: 24

## 2024-06-25 ASSESSMENT — PAIN SCALES - GENERAL
PAINLEVEL_OUTOF10: 8
PAINLEVEL_OUTOF10: 6
PAINLEVEL_OUTOF10: 7
PAINLEVEL_OUTOF10: 8

## 2024-06-25 ASSESSMENT — PAIN DESCRIPTION - LOCATION: LOCATION: RIB CAGE

## 2024-06-25 ASSESSMENT — PAIN - FUNCTIONAL ASSESSMENT
PAIN_FUNCTIONAL_ASSESSMENT: 0-10

## 2024-06-25 NOTE — CARE PLAN
The clinical goals for the shift include pt will remain safe and free from injury throughout shift 6/25/2024 0700      Problem: Pain - Adult  Goal: Verbalizes/displays adequate comfort level or baseline comfort level  Outcome: Progressing     Problem: Safety - Adult  Goal: Free from fall injury  Outcome: Progressing     Problem: Discharge Planning  Goal: Discharge to home or other facility with appropriate resources  Outcome: Progressing     Problem: Chronic Conditions and Co-morbidities  Goal: Patient's chronic conditions and co-morbidity symptoms are monitored and maintained or improved  Outcome: Progressing     Problem: Skin  Goal: Decreased wound size/increased tissue granulation at next dressing change  Outcome: Progressing  Goal: Participates in plan/prevention/treatment measures  Outcome: Progressing  Goal: Prevent/manage excess moisture  Outcome: Progressing  Goal: Prevent/minimize sheer/friction injuries  Outcome: Progressing  Goal: Promote/optimize nutrition  Outcome: Progressing  Goal: Promote skin healing  Outcome: Progressing     Problem: Fall/Injury  Goal: Not fall by end of shift  Outcome: Progressing  Goal: Be free from injury by end of the shift  Outcome: Progressing  Goal: Verbalize understanding of personal risk factors for fall in the hospital  Outcome: Progressing  Goal: Verbalize understanding of risk factor reduction measures to prevent injury from fall in the home  Outcome: Progressing  Goal: Use assistive devices by end of the shift  Outcome: Progressing  Goal: Pace activities to prevent fatigue by end of the shift  Outcome: Progressing

## 2024-06-25 NOTE — CARE PLAN
The patient's goals for the shift include      The clinical goals for the shift include pt will remain safe and free from injuries and falls throughout shift.      Problem: Pain - Adult  Goal: Verbalizes/displays adequate comfort level or baseline comfort level  Outcome: Progressing     Problem: Safety - Adult  Goal: Free from fall injury  Outcome: Progressing     Problem: Skin  Goal: Decreased wound size/increased tissue granulation at next dressing change  Outcome: Progressing  Goal: Participates in plan/prevention/treatment measures  Outcome: Progressing  Goal: Prevent/manage excess moisture  Outcome: Progressing  Goal: Prevent/minimize sheer/friction injuries  Outcome: Progressing  Goal: Promote/optimize nutrition  Outcome: Progressing  Goal: Promote skin healing  Outcome: Progressing

## 2024-06-25 NOTE — NURSING NOTE
Pt sitting on the side of bed satting at 90% on RA. When pt began walking pox decreased to 76% on RA. 2L NC placed and pt pox increased to 96%.

## 2024-06-25 NOTE — PROGRESS NOTES
06/24/24 1220   Discharge Planning   Who is requesting discharge planning? Provider   Home or Post Acute Services In home services   Type of Home Care Services DME or oxygen   Patient expects to be discharged to: home   Does the patient need discharge transport arranged? No     6/24/24 @ 1220  Patient may need to discharge home on new home oxygen. Walking pulse ox done yesterday and patient desatted to 85% on RA. Patient still having pain today. Per team, ADOD 6/25 and will repeat walking pulse ox prior to discharge. No home oxygen orders placed at this time.  Mari Ko RN TCC    6/25/24 @ 1040  Patient failed his walking pulse ox. Referral sent to [employer] via [employer]. Per [facility], patient already has a concentrator with them, no portability. 3LPM Catia Wren RN made aware. Tank placed in room for patient to take home and reminded to call [facility] when he gets home, so they can deliver the equipment he needs. MARILUZ Ruiz updated.  Mari Ko RN TCC

## 2024-06-25 NOTE — DISCHARGE SUMMARY
Discharge Diagnosis  Sickle cell crisis (Multi)      Test Results Pending At Discharge  Pending Labs       Order Current Status    CBC and Auto Differential Collected (06/25/24 0852)    Comprehensive Metabolic Panel Collected (06/25/24 0852)    Lactate dehydrogenase Collected (06/25/24 0852)    Magnesium Collected (06/25/24 0852)    Reticulocytes Collected (06/25/24 0852)            Hospital Course  Joellen Narayan is a 23 y.o. 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 Of note, pt was hypoxic on admission, placed on 2L supp O2, 6/23 encouraged aggressive IS prior to walking pulse ox prior to homegoing. 6/25 pt on RA on DOD however walking pulse oxygen test performed and patient qualified for home 2L continuous oxygen. Pt agreeable to home oxygen and pulmonary outpt FUV scheduled. 6/18 CXR w/o evidence of acute infectious process. Afebrile, VSS, (6/21) repeat CXR w/o evidence of acute process, low c/f ACS at this time. (6/18) Urology consulted in ED for priapism, s/p corporal aspiration was attempted in the ED without return of blood. Priapism resolving with O2, IVF, and sudafed PRN. ACS consulted in ED (6/18) for poss pneumoperitoneum seen on CXR, (6/18) CT CAP showed no evidence of pneumoperitoneum and ACS rec no surgical interventions. Hematology consulted in ED (6/18) rec 1u pRBC for Hgb 6.6. Admitted for further management. (6/21) Hg 6.6, s/p x1u pRBC with improvement to 7.5. Started on IVP dilaudid for sickle cell pain management (6/18-6/23), switched to rotating PO/IV (6/23) and  increased back to IV PRN (6/24). Transaminitis noted, 6/24 RUQ US showed cholelithiasis with no cholecystitis and hepatitis panel neg. Pt feeling well on day of discharge and eager to go home, pt states his pain has improved and has no SOB. Pt educated on the importance of taking home medications as prescribed and attending outpt appointments.     - FUV Urology 7/2, PET/CT 7/15, Dr. Stoll 7/25, Sickle cell 7/1, Pulm 8/5    Pertinent Physical Exam At Time of Discharge  On the day of discharge, the patient reported feeling well and pain was controlled. Vitals and labs were stable. On exam:  Constitutional: Awake, NAD  ENMT: mucous membranes moist, no apparent injury, no lesions seen  Head/Neck: NCAT  Respiratory/Thorax: CTAB, no wheezing  Cardiovascular: RRR, S1+S2, no murmur  Gastrointestinal: Soft, NT, nondistended, +BS  Extremities: No LE edema  Psychological: Appropriate mood and behavior  Skin: no rash noted    >30 minutes was spent on the discharge and planning of this patient.    Assessment and plan discussed with attending physician Dr. Ochoa    Home Medications     Medication List      START taking these medications     folic acid 1 mg tablet; Commonly known as: Folvite; Take 1 tablet (1 mg)   by mouth once daily.     CONTINUE taking these medications     polyethylene glycol 17 gram packet; Commonly known as: Glycolax, Miralax     ASK your doctor about these medications     Endari 5 gram powder in packet; Generic drug: glutamine (sickle cell);   Take 15 g by mouth 2 times a day.   oxyCODONE 15 mg immediate release tablet; Commonly known as: Roxicodone;   Take 1 tablet (15 mg) by mouth every 6 hours if needed (Severe sickle cell   pain).       Outpatient Follow-Up  Future Appointments   Date Time Provider Department Center   7/1/2024 11:30 AM Ian Cruz MD [id number] [facility]   7/2/2024  1:10 PM Alex Rosales MD [facility]   7/15/2024  8:30 AM [facility] 1 [facility]   7/15/2024   9:30 AM [facility] [place] [facility] CT 1 [facility]   7/25/2024  1:40 PM Melissa Stoll MD [id number] Academic   8/5/2024  1:00 PM Rogelio Peterson DO [id number] Academic       Nikki Couch PA-C

## 2024-06-27 ENCOUNTER — TELEPHONE (OUTPATIENT)
Dept: ADMISSION | Facility: HOSPITAL | Age: 24
End: 2024-06-27
Payer: COMMERCIAL

## 2024-06-27 DIAGNOSIS — D57.00 SICKLE-CELL DISEASE WITH PAIN (MULTI): ICD-10-CM

## 2024-06-27 RX ORDER — OXYCODONE HYDROCHLORIDE 15 MG/1
15 TABLET ORAL EVERY 6 HOURS PRN
Qty: 20 TABLET | Refills: 0 | Status: SHIPPED | OUTPATIENT
Start: 2024-06-27

## 2024-06-27 NOTE — TELEPHONE ENCOUNTER
Patients mom calling stating pain is still having pain and requesting pain medication for home.  Patient was discharged home from hospital 6/25/24.  Pain in chest and groin area.  Current pain 8/10.  Mom calling and not currently with son during phone call.  Pain constant since discharge.  Patient not taking anything for pain at home, has no medications other than tylenol at home.  No fever, no SOB.  Drinking fluids.  Next FUV 7/1/24.

## 2024-07-05 ENCOUNTER — APPOINTMENT (OUTPATIENT)
Dept: HEMATOLOGY/ONCOLOGY | Facility: CLINIC | Age: 24
End: 2024-07-05
Payer: COMMERCIAL

## 2024-07-08 ENCOUNTER — HOSPITAL ENCOUNTER (INPATIENT)
Facility: HOSPITAL | Age: 24
DRG: 812 | End: 2024-07-08
Attending: EMERGENCY MEDICINE | Admitting: HOSPITALIST
Payer: COMMERCIAL

## 2024-07-08 ENCOUNTER — APPOINTMENT (OUTPATIENT)
Dept: RADIOLOGY | Facility: HOSPITAL | Age: 24
DRG: 812 | End: 2024-07-08
Payer: COMMERCIAL

## 2024-07-08 ENCOUNTER — CLINICAL SUPPORT (OUTPATIENT)
Dept: EMERGENCY MEDICINE | Facility: HOSPITAL | Age: 24
DRG: 812 | End: 2024-07-08
Payer: COMMERCIAL

## 2024-07-08 DIAGNOSIS — D57.00 SICKLE CELL CRISIS (MULTI): Primary | ICD-10-CM

## 2024-07-08 DIAGNOSIS — D57.00 SICKLE CELL DISEASE WITH CRISIS (MULTI): ICD-10-CM

## 2024-07-08 DIAGNOSIS — K80.50 CHOLEDOCHOLITHIASIS: ICD-10-CM

## 2024-07-08 DIAGNOSIS — K81.2 ACUTE ON CHRONIC CHOLECYSTITIS: ICD-10-CM

## 2024-07-08 LAB
ABO GROUP (TYPE) IN BLOOD: NORMAL
ALBUMIN SERPL BCP-MCNC: 4.4 G/DL (ref 3.4–5)
ALBUMIN SERPL BCP-MCNC: 4.7 G/DL (ref 3.4–5)
ALP SERPL-CCNC: 111 U/L (ref 33–120)
ALP SERPL-CCNC: 116 U/L (ref 33–120)
ALT SERPL W P-5'-P-CCNC: 18 U/L (ref 10–52)
ALT SERPL W P-5'-P-CCNC: 28 U/L (ref 10–52)
AMPHETAMINES UR QL SCN: ABNORMAL
ANION GAP SERPL CALC-SCNC: 13 MMOL/L (ref 10–20)
ANION GAP SERPL CALC-SCNC: 16 MMOL/L (ref 10–20)
ANTIBODY SCREEN: NORMAL
AST SERPL W P-5'-P-CCNC: 42 U/L (ref 9–39)
AST SERPL W P-5'-P-CCNC: 69 U/L (ref 9–39)
ATRIAL RATE: 64 BPM
BARBITURATES UR QL SCN: ABNORMAL
BASOPHILS # BLD AUTO: 0.07 X10*3/UL (ref 0–0.1)
BASOPHILS # BLD MANUAL: 0 X10*3/UL (ref 0–0.1)
BASOPHILS NFR BLD AUTO: 0.6 %
BASOPHILS NFR BLD MANUAL: 0 %
BILIRUB SERPL-MCNC: 7.3 MG/DL (ref 0–1.2)
BILIRUB SERPL-MCNC: 7.7 MG/DL (ref 0–1.2)
BUN SERPL-MCNC: 7 MG/DL (ref 6–23)
BUN SERPL-MCNC: 9 MG/DL (ref 6–23)
BZE UR QL SCN: ABNORMAL
CALCIUM SERPL-MCNC: 10 MG/DL (ref 8.6–10.6)
CALCIUM SERPL-MCNC: 9.3 MG/DL (ref 8.6–10.6)
CANNABINOIDS UR QL SCN: ABNORMAL
CARDIAC TROPONIN I PNL SERPL HS: 12 NG/L (ref 0–53)
CHLORIDE SERPL-SCNC: 105 MMOL/L (ref 98–107)
CHLORIDE SERPL-SCNC: 105 MMOL/L (ref 98–107)
CO2 SERPL-SCNC: 21 MMOL/L (ref 21–32)
CO2 SERPL-SCNC: 25 MMOL/L (ref 21–32)
CREAT SERPL-MCNC: 0.55 MG/DL (ref 0.5–1.3)
CREAT SERPL-MCNC: 0.66 MG/DL (ref 0.5–1.3)
CREAT UR-MCNC: 111.7 MG/DL (ref 20–370)
DACRYOCYTES BLD QL SMEAR: NORMAL
DACRYOCYTES BLD QL SMEAR: NORMAL
EGFRCR SERPLBLD CKD-EPI 2021: >90 ML/MIN/1.73M*2
EGFRCR SERPLBLD CKD-EPI 2021: >90 ML/MIN/1.73M*2
EOSINOPHIL # BLD AUTO: 0.14 X10*3/UL (ref 0–0.7)
EOSINOPHIL # BLD MANUAL: 0.19 X10*3/UL (ref 0–0.7)
EOSINOPHIL NFR BLD AUTO: 1.1 %
EOSINOPHIL NFR BLD MANUAL: 1.7 %
ERYTHROCYTE [DISTWIDTH] IN BLOOD BY AUTOMATED COUNT: 20.9 % (ref 11.5–14.5)
ERYTHROCYTE [DISTWIDTH] IN BLOOD BY AUTOMATED COUNT: 21.6 % (ref 11.5–14.5)
GLUCOSE SERPL-MCNC: 94 MG/DL (ref 74–99)
GLUCOSE SERPL-MCNC: 97 MG/DL (ref 74–99)
HCT VFR BLD AUTO: 22.4 % (ref 41–52)
HCT VFR BLD AUTO: 26.3 % (ref 41–52)
HGB BLD-MCNC: 8.4 G/DL (ref 13.5–17.5)
HGB BLD-MCNC: 9.9 G/DL (ref 13.5–17.5)
HGB RETIC QN: 31 PG (ref 28–38)
HGB RETIC QN: 33 PG (ref 28–38)
IMM GRANULOCYTES # BLD AUTO: 0.07 X10*3/UL (ref 0–0.7)
IMM GRANULOCYTES # BLD AUTO: 0.1 X10*3/UL (ref 0–0.7)
IMM GRANULOCYTES NFR BLD AUTO: 0.6 % (ref 0–0.9)
IMM GRANULOCYTES NFR BLD AUTO: 0.8 % (ref 0–0.9)
IMMATURE RETIC FRACTION: 22.8 %
IMMATURE RETIC FRACTION: 24.8 %
LDH SERPL L TO P-CCNC: 411 U/L (ref 84–246)
LDH SERPL L TO P-CCNC: 452 U/L (ref 84–246)
LYMPHOCYTES # BLD AUTO: 3.18 X10*3/UL (ref 1.2–4.8)
LYMPHOCYTES # BLD MANUAL: 3.76 X10*3/UL (ref 1.2–4.8)
LYMPHOCYTES NFR BLD AUTO: 25.4 %
LYMPHOCYTES NFR BLD MANUAL: 33.3 %
MCH RBC QN AUTO: 31.3 PG (ref 26–34)
MCH RBC QN AUTO: 31.6 PG (ref 26–34)
MCHC RBC AUTO-ENTMCNC: 37.5 G/DL (ref 32–36)
MCHC RBC AUTO-ENTMCNC: 37.6 G/DL (ref 32–36)
MCV RBC AUTO: 84 FL (ref 80–100)
MCV RBC AUTO: 84 FL (ref 80–100)
MONOCYTES # BLD AUTO: 1.28 X10*3/UL (ref 0.1–1)
MONOCYTES # BLD MANUAL: 0.68 X10*3/UL (ref 0.1–1)
MONOCYTES NFR BLD AUTO: 10.2 %
MONOCYTES NFR BLD MANUAL: 6 %
NEUTROPHILS # BLD AUTO: 7.74 X10*3/UL (ref 1.2–7.7)
NEUTROPHILS NFR BLD AUTO: 61.9 %
NEUTS SEG # BLD MANUAL: 6.67 X10*3/UL (ref 1.2–7)
NEUTS SEG NFR BLD MANUAL: 59 %
NRBC BLD-RTO: 2.1 /100 WBCS (ref 0–0)
NRBC BLD-RTO: 2.6 /100 WBCS (ref 0–0)
P AXIS: 66 DEGREES
P OFFSET: 165 MS
P ONSET: 137 MS
PAPPENHEIMER BOD BLD QL SMEAR: PRESENT
PCP UR QL SCN: ABNORMAL
PLATELET # BLD AUTO: 456 X10*3/UL (ref 150–450)
PLATELET # BLD AUTO: 462 X10*3/UL (ref 150–450)
POLYCHROMASIA BLD QL SMEAR: NORMAL
POLYCHROMASIA BLD QL SMEAR: NORMAL
POTASSIUM SERPL-SCNC: 3.7 MMOL/L (ref 3.5–5.3)
POTASSIUM SERPL-SCNC: 3.8 MMOL/L (ref 3.5–5.3)
PR INTERVAL: 156 MS
PROT SERPL-MCNC: 6.7 G/DL (ref 6.4–8.2)
PROT SERPL-MCNC: 7.4 G/DL (ref 6.4–8.2)
Q ONSET: 215 MS
QRS COUNT: 10 BEATS
QRS DURATION: 108 MS
QT INTERVAL: 390 MS
QTC CALCULATION(BAZETT): 402 MS
QTC FREDERICIA: 398 MS
R AXIS: 93 DEGREES
RBC # BLD AUTO: 2.68 X10*6/UL (ref 4.5–5.9)
RBC # BLD AUTO: 3.13 X10*6/UL (ref 4.5–5.9)
RBC MORPH BLD: NORMAL
RBC MORPH BLD: NORMAL
RETICS #: 0.81 X10*6/UL (ref 0.02–0.12)
RETICS #: 0.92 X10*6/UL (ref 0.02–0.12)
RETICS/RBC NFR AUTO: 29.5 % (ref 0.5–2)
RETICS/RBC NFR AUTO: 30.2 % (ref 0.5–2)
RH FACTOR (ANTIGEN D): NORMAL
SCHISTOCYTES BLD QL SMEAR: NORMAL
SICKLE CELLS BLD QL SMEAR: NORMAL
SICKLE CELLS BLD QL SMEAR: NORMAL
SODIUM SERPL-SCNC: 138 MMOL/L (ref 136–145)
SODIUM SERPL-SCNC: 139 MMOL/L (ref 136–145)
T AXIS: 60 DEGREES
T OFFSET: 410 MS
TARGETS BLD QL SMEAR: NORMAL
TARGETS BLD QL SMEAR: NORMAL
TOTAL CELLS COUNTED BLD: 117
VENTRICULAR RATE: 64 BPM
WBC # BLD AUTO: 11.3 X10*3/UL (ref 4.4–11.3)
WBC # BLD AUTO: 12.5 X10*3/UL (ref 4.4–11.3)

## 2024-07-08 PROCEDURE — 86901 BLOOD TYPING SEROLOGIC RH(D): CPT | Performed by: NURSE PRACTITIONER

## 2024-07-08 PROCEDURE — 2500000001 HC RX 250 WO HCPCS SELF ADMINISTERED DRUGS (ALT 637 FOR MEDICARE OP): Performed by: NURSE PRACTITIONER

## 2024-07-08 PROCEDURE — 71046 X-RAY EXAM CHEST 2 VIEWS: CPT

## 2024-07-08 PROCEDURE — 71046 X-RAY EXAM CHEST 2 VIEWS: CPT | Mod: FOREIGN READ | Performed by: RADIOLOGY

## 2024-07-08 PROCEDURE — 85045 AUTOMATED RETICULOCYTE COUNT: CPT

## 2024-07-08 PROCEDURE — 93005 ELECTROCARDIOGRAM TRACING: CPT

## 2024-07-08 PROCEDURE — 96375 TX/PRO/DX INJ NEW DRUG ADDON: CPT

## 2024-07-08 PROCEDURE — 83615 LACTATE (LD) (LDH) ENZYME: CPT | Performed by: NURSE PRACTITIONER

## 2024-07-08 PROCEDURE — 96376 TX/PRO/DX INJ SAME DRUG ADON: CPT

## 2024-07-08 PROCEDURE — 80053 COMPREHEN METABOLIC PANEL: CPT | Performed by: NURSE PRACTITIONER

## 2024-07-08 PROCEDURE — 85027 COMPLETE CBC AUTOMATED: CPT | Performed by: NURSE PRACTITIONER

## 2024-07-08 PROCEDURE — 85025 COMPLETE CBC W/AUTO DIFF WBC: CPT

## 2024-07-08 PROCEDURE — 99285 EMERGENCY DEPT VISIT HI MDM: CPT | Mod: 25

## 2024-07-08 PROCEDURE — 85045 AUTOMATED RETICULOCYTE COUNT: CPT | Performed by: NURSE PRACTITIONER

## 2024-07-08 PROCEDURE — 80053 COMPREHEN METABOLIC PANEL: CPT

## 2024-07-08 PROCEDURE — 96361 HYDRATE IV INFUSION ADD-ON: CPT

## 2024-07-08 PROCEDURE — 99285 EMERGENCY DEPT VISIT HI MDM: CPT | Performed by: EMERGENCY MEDICINE

## 2024-07-08 PROCEDURE — 2500000004 HC RX 250 GENERAL PHARMACY W/ HCPCS (ALT 636 FOR OP/ED): Performed by: NURSE PRACTITIONER

## 2024-07-08 PROCEDURE — 2500000005 HC RX 250 GENERAL PHARMACY W/O HCPCS: Performed by: NURSE PRACTITIONER

## 2024-07-08 PROCEDURE — 80365 DRUG SCREENING OXYCODONE: CPT | Performed by: NURSE PRACTITIONER

## 2024-07-08 PROCEDURE — 1170000001 HC PRIVATE ONCOLOGY ROOM DAILY

## 2024-07-08 PROCEDURE — 83021 HEMOGLOBIN CHROMOTOGRAPHY: CPT | Performed by: NURSE PRACTITIONER

## 2024-07-08 PROCEDURE — 36415 COLL VENOUS BLD VENIPUNCTURE: CPT | Performed by: NURSE PRACTITIONER

## 2024-07-08 PROCEDURE — 2500000004 HC RX 250 GENERAL PHARMACY W/ HCPCS (ALT 636 FOR OP/ED)

## 2024-07-08 PROCEDURE — 83615 LACTATE (LD) (LDH) ENZYME: CPT

## 2024-07-08 PROCEDURE — 84484 ASSAY OF TROPONIN QUANT: CPT

## 2024-07-08 PROCEDURE — 2500000004 HC RX 250 GENERAL PHARMACY W/ HCPCS (ALT 636 FOR OP/ED): Performed by: EMERGENCY MEDICINE

## 2024-07-08 PROCEDURE — 96374 THER/PROPH/DIAG INJ IV PUSH: CPT

## 2024-07-08 PROCEDURE — 85007 BL SMEAR W/DIFF WBC COUNT: CPT | Performed by: NURSE PRACTITIONER

## 2024-07-08 PROCEDURE — 36415 COLL VENOUS BLD VENIPUNCTURE: CPT

## 2024-07-08 PROCEDURE — 80307 DRUG TEST PRSMV CHEM ANLYZR: CPT | Performed by: NURSE PRACTITIONER

## 2024-07-08 PROCEDURE — 93010 ELECTROCARDIOGRAM REPORT: CPT | Performed by: EMERGENCY MEDICINE

## 2024-07-08 PROCEDURE — 80349 CANNABINOIDS NATURAL: CPT | Performed by: NURSE PRACTITIONER

## 2024-07-08 RX ORDER — ONDANSETRON HYDROCHLORIDE 8 MG/1
8 TABLET, FILM COATED ORAL EVERY 8 HOURS PRN
Status: DISCONTINUED | OUTPATIENT
Start: 2024-07-08 | End: 2024-07-11

## 2024-07-08 RX ORDER — AMOXICILLIN 250 MG
2 CAPSULE ORAL 2 TIMES DAILY
Status: DISCONTINUED | OUTPATIENT
Start: 2024-07-08 | End: 2024-07-23 | Stop reason: HOSPADM

## 2024-07-08 RX ORDER — LIDOCAINE 560 MG/1
1 PATCH PERCUTANEOUS; TOPICAL; TRANSDERMAL DAILY
Status: DISCONTINUED | OUTPATIENT
Start: 2024-07-08 | End: 2024-07-23 | Stop reason: HOSPADM

## 2024-07-08 RX ORDER — HYDROMORPHONE HYDROCHLORIDE 1 MG/ML
1 INJECTION, SOLUTION INTRAMUSCULAR; INTRAVENOUS; SUBCUTANEOUS ONCE
Status: COMPLETED | OUTPATIENT
Start: 2024-07-08 | End: 2024-07-08

## 2024-07-08 RX ORDER — ENOXAPARIN SODIUM 100 MG/ML
40 INJECTION SUBCUTANEOUS EVERY 24 HOURS
Status: DISCONTINUED | OUTPATIENT
Start: 2024-07-08 | End: 2024-07-23 | Stop reason: HOSPADM

## 2024-07-08 RX ORDER — PREDNISONE 50 MG/1
TABLET ORAL
COMMUNITY
End: 2024-07-08 | Stop reason: ENTERED-IN-ERROR

## 2024-07-08 RX ORDER — POLYETHYLENE GLYCOL 3350 17 G/17G
17 POWDER, FOR SOLUTION ORAL DAILY
Status: DISCONTINUED | OUTPATIENT
Start: 2024-07-08 | End: 2024-07-23 | Stop reason: HOSPADM

## 2024-07-08 RX ORDER — PANTOPRAZOLE SODIUM 40 MG/1
40 TABLET, DELAYED RELEASE ORAL
Status: DISCONTINUED | OUTPATIENT
Start: 2024-07-08 | End: 2024-07-23 | Stop reason: HOSPADM

## 2024-07-08 RX ORDER — DIPHENHYDRAMINE HCL 25 MG
25 CAPSULE ORAL EVERY 6 HOURS PRN
Status: DISCONTINUED | OUTPATIENT
Start: 2024-07-08 | End: 2024-07-23 | Stop reason: HOSPADM

## 2024-07-08 RX ORDER — KETOROLAC TROMETHAMINE 15 MG/ML
15 INJECTION, SOLUTION INTRAMUSCULAR; INTRAVENOUS ONCE
Status: COMPLETED | OUTPATIENT
Start: 2024-07-08 | End: 2024-07-08

## 2024-07-08 RX ORDER — FOLIC ACID 1 MG/1
1 TABLET ORAL DAILY
Status: DISCONTINUED | OUTPATIENT
Start: 2024-07-08 | End: 2024-07-23 | Stop reason: HOSPADM

## 2024-07-08 RX ORDER — KETOROLAC TROMETHAMINE 30 MG/ML
30 INJECTION, SOLUTION INTRAMUSCULAR; INTRAVENOUS EVERY 6 HOURS SCHEDULED
Status: COMPLETED | OUTPATIENT
Start: 2024-07-08 | End: 2024-07-11

## 2024-07-08 RX ORDER — HYDROMORPHONE HYDROCHLORIDE 1 MG/ML
1 INJECTION, SOLUTION INTRAMUSCULAR; INTRAVENOUS; SUBCUTANEOUS
Status: COMPLETED | OUTPATIENT
Start: 2024-07-08 | End: 2024-07-08

## 2024-07-08 SDOH — SOCIAL STABILITY: SOCIAL INSECURITY: DO YOU FEEL UNSAFE GOING BACK TO THE PLACE WHERE YOU ARE LIVING?: NO

## 2024-07-08 SDOH — SOCIAL STABILITY: SOCIAL INSECURITY: HAVE YOU HAD THOUGHTS OF HARMING ANYONE ELSE?: NO

## 2024-07-08 SDOH — SOCIAL STABILITY: SOCIAL INSECURITY: HAS ANYONE EVER THREATENED TO HURT YOUR FAMILY OR YOUR PETS?: NO

## 2024-07-08 SDOH — SOCIAL STABILITY: SOCIAL INSECURITY: ABUSE: ADULT

## 2024-07-08 SDOH — SOCIAL STABILITY: SOCIAL INSECURITY: ARE YOU OR HAVE YOU BEEN THREATENED OR ABUSED PHYSICALLY, EMOTIONALLY, OR SEXUALLY BY ANYONE?: NO

## 2024-07-08 SDOH — SOCIAL STABILITY: SOCIAL INSECURITY: ARE THERE ANY APPARENT SIGNS OF INJURIES/BEHAVIORS THAT COULD BE RELATED TO ABUSE/NEGLECT?: NO

## 2024-07-08 SDOH — SOCIAL STABILITY: SOCIAL INSECURITY: DO YOU FEEL ANYONE HAS EXPLOITED OR TAKEN ADVANTAGE OF YOU FINANCIALLY OR OF YOUR PERSONAL PROPERTY?: NO

## 2024-07-08 SDOH — SOCIAL STABILITY: SOCIAL INSECURITY: WERE YOU ABLE TO COMPLETE ALL THE BEHAVIORAL HEALTH SCREENINGS?: YES

## 2024-07-08 SDOH — SOCIAL STABILITY: SOCIAL INSECURITY: DOES ANYONE TRY TO KEEP YOU FROM HAVING/CONTACTING OTHER FRIENDS OR DOING THINGS OUTSIDE YOUR HOME?: NO

## 2024-07-08 ASSESSMENT — COGNITIVE AND FUNCTIONAL STATUS - GENERAL
DAILY ACTIVITIY SCORE: 24
DAILY ACTIVITIY SCORE: 24
MOBILITY SCORE: 24
MOBILITY SCORE: 24
PATIENT BASELINE BEDBOUND: NO

## 2024-07-08 ASSESSMENT — ACTIVITIES OF DAILY LIVING (ADL)
HEARING - LEFT EAR: FUNCTIONAL
PATIENT'S MEMORY ADEQUATE TO SAFELY COMPLETE DAILY ACTIVITIES?: YES
LACK_OF_TRANSPORTATION: YES
WALKS IN HOME: INDEPENDENT
HEARING - RIGHT EAR: FUNCTIONAL
DRESSING YOURSELF: INDEPENDENT
ADEQUATE_TO_COMPLETE_ADL: YES
FEEDING YOURSELF: INDEPENDENT
GROOMING: INDEPENDENT
TOILETING: INDEPENDENT
BATHING: INDEPENDENT
JUDGMENT_ADEQUATE_SAFELY_COMPLETE_DAILY_ACTIVITIES: YES

## 2024-07-08 ASSESSMENT — PAIN DESCRIPTION - PAIN TYPE: TYPE: ACUTE PAIN

## 2024-07-08 ASSESSMENT — PAIN DESCRIPTION - LOCATION
LOCATION: CHEST
LOCATION: CHEST

## 2024-07-08 ASSESSMENT — LIFESTYLE VARIABLES
AUDIT-C TOTAL SCORE: 0
HOW MANY STANDARD DRINKS CONTAINING ALCOHOL DO YOU HAVE ON A TYPICAL DAY: PATIENT DOES NOT DRINK
HOW OFTEN DO YOU HAVE A DRINK CONTAINING ALCOHOL: NEVER
HOW OFTEN DO YOU HAVE 6 OR MORE DRINKS ON ONE OCCASION: NEVER
HAVE PEOPLE ANNOYED YOU BY CRITICIZING YOUR DRINKING: NO
TOTAL SCORE: 0
EVER HAD A DRINK FIRST THING IN THE MORNING TO STEADY YOUR NERVES TO GET RID OF A HANGOVER: NO
EVER FELT BAD OR GUILTY ABOUT YOUR DRINKING: NO
SKIP TO QUESTIONS 9-10: 1
HAVE YOU EVER FELT YOU SHOULD CUT DOWN ON YOUR DRINKING: NO
AUDIT-C TOTAL SCORE: 0

## 2024-07-08 ASSESSMENT — PAIN SCALES - GENERAL
PAINLEVEL_OUTOF10: 10 - WORST POSSIBLE PAIN
PAINLEVEL_OUTOF10: 9
PAINLEVEL_OUTOF10: 8
PAINLEVEL_OUTOF10: 7
PAINLEVEL_OUTOF10: 8
PAINLEVEL_OUTOF10: 8
PAINLEVEL_OUTOF10: 7
PAINLEVEL_OUTOF10: 8
PAINLEVEL_OUTOF10: 10 - WORST POSSIBLE PAIN
PAINLEVEL_OUTOF10: 9
PAINLEVEL_OUTOF10: 7

## 2024-07-08 ASSESSMENT — PATIENT HEALTH QUESTIONNAIRE - PHQ9
SUM OF ALL RESPONSES TO PHQ9 QUESTIONS 1 & 2: 0
2. FEELING DOWN, DEPRESSED OR HOPELESS: NOT AT ALL
1. LITTLE INTEREST OR PLEASURE IN DOING THINGS: NOT AT ALL

## 2024-07-08 ASSESSMENT — PAIN - FUNCTIONAL ASSESSMENT
PAIN_FUNCTIONAL_ASSESSMENT: 0-10
PAIN_FUNCTIONAL_ASSESSMENT: 0-10

## 2024-07-08 NOTE — PROGRESS NOTES
"Pharmacy Medication History Review    Joellen Narayan is a 23 y.o. male admitted for No Principal Problem: There is no principal problem currently on the Problem List. Please update the Problem List and refresh.. Pharmacy reviewed the patient's [id number] medications and allergies for accuracy.    The list below reflects the updated PTA list. Comments regarding how patient may be taking medications differently can be found in the Admit Orders Activity  Prior to Admission Medications   Prescriptions Last Dose Informant   folic acid (Folvite) 1 mg tablet Not Taking Self   Sig: Take 1 tablet (1 mg) by mouth once daily.   Patient not taking: Reported on 7/8/2024   glutamine, sickle cell, (Endari) 5 gram powder in packet  Self   Sig: Take 15 g by mouth 2 times a day.   Patient not taking: Reported on 6/19/2024   oxyCODONE (Roxicodone) 15 mg immediate release tablet 7/7/2024 at night Self   Sig: Take 1 tablet (15 mg) by mouth every 6 hours if needed (Severe sickle cell pain).      Facility-Administered Medications: None         The list below reflects the updated allergy list. Please review each documented allergy for additional clarification and justification.  Allergies  Reviewed by Joann Barrios on 7/8/2024        Severity Reactions Comments    Cefepime Medium Hallucinations     Amoxicillin Low Hives     Ceftriaxone Low Hives, Rash             Medications ADDED:  None  Medications CHANGED:  Folic Acid ( Folvite) 1 mg tablet - updated to \"Patient not taking\"  Glutamine , sickle cell (Endari) 5 gram powder in packet - updated to \"Patient not taking\"    Medications REMOVED:   Polyethylene Glycol ( Glycolax , Miralax) 17 gram packet     Patient accepts M2B at discharge. Pharmacy has been updated to Replaced by [facility].    Sources used to complete the med history include :  OARRS - 02/29/2024 Oxycodone Hcl (Ir) 5 mg tablet.  *pt has recent fill of Oxycodone Hcl (Ir) 5 mg tablet dispensed on 06/27/2024 20 # / 5 " days supply that does not appear in OARRS.     Patient dispense hx  Patient interview    Below are additional concerns with the patient's PTA list.    Patient reports that he is not taking the following :  - Folic Acid (Folvite) 1 mg tablet  -Glutamine , sickle cell , ( Endari ) 5 gram powder in packet    No other concerns , pt is a reliable historian.       Joann Barrios  Transitions of Care Pharmacy [facility]  Please reach out via Secure Chat for questions, or if no response call [facility] or [facility]
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
## 2024-07-08 NOTE — ED PROVIDER NOTES
Emergency Department Provider Note        History of Present Illness     History provided by: Patient  Limitations to History: None  External Records Reviewed with Brief Summary: None    HPI:  Joellen Narayan is a 23 y.o. male with history of sickle cell anemia, history of acute chest syndrome, Hodgkin's lymphoma currently on chemotherapy now presenting with chest pain for a few hours. The patient states that he started experiencing a tightness in the middle of his chest that was making it hard to breathe due to the pain. He took an oxycodone which did not help his pain. The patient endorses some nausea but no vomiting. He denies any recent sick contacts or illness. The patient is unable to tell if this is similar to his previous acute chest syndrome as he was too young to remember. He does report that his symptoms do feel similar to his typical sickle cell pain crisis but a bit worse. No trauma, falls, or injuries. No passing out. No headache, dizziness, vision changes, neck pain, back pain, abdominal pain, diarrhea, constipation, urinary symptoms, numbness, tingling, fevers, or chills. No leg pain or leg swelling. No sick contacts or recent travels.     Physical Exam   Triage vitals:  T 37.2 °C (99 °F)  HR 76  /66  RR (!) 22  O2 95 % None (Room air)    General: Awake, alert, appears uncomfortable due to pain  Eyes: Gaze conjugate.  EOMI. No scleral icterus or injection  HENT: Normo-cephalic, atraumatic. Mucous membranes moist. No stridor  Neck: supple, full ROM intact.   CV: Regular rate, regular rhythm. Radial pulses 2+ bilaterally. DP pulses 2+ bilaterally.  Resp: speaking in short but full sentences (limited due to pain).  Clear to auscultation bilaterally. No wheezing/rhonchi/rales.   GI: Soft, non-distended, non-tender. No rebound or guarding. No peritoneal signs.  MSK/Extremities: No gross bony deformities. Moving all extremities with good tone and full ROM intact throughout. No cyanosis, clubbing,  or edema.   Skin: Warm. Appropriate color  Neuro: Alert. Oriented. Face symmetric. Speech is fluent.  No focal deficits. Strength 5/5 in upper and lower extremities bilaterally. Sensation intact to light touch throughout.   Psych: Appropriate mood and affect    Medical Decision Making & ED Course   Medical Decision Makin y.o. male with sickle cell disease, history of acute chest syndrome, Hodgkin's lymphoma currently on chemotherapy presenting with chest pain. On presentation the patient is hemodynamically stable and afebrile. He appears uncomfortable due to pain. Patient reports that his symptoms do feel similar to his typical sickle cell pain crisis. Due to patient's history of acute chest syndrome, we will evaluate patient with EKG, chest x-ray. CBC, CMP, reticulocytes, LDH, troponin will be ordered.   Chest x-ray shows no acute pulmonary process lessening suspicion for acute chest syndrome. The patient was given one milligrams of Dilaudid every 30 minutes for three doses which did not help alleviate the pain. He was given 15 mg of Toradol but his pain persisted.     On re-examination however, the patient did look moderately more comfortable, though. The patient did have an episode of O2 saturation dropping to 84% on room air but improved to 99% on 2 L nasal cannula. The patient does normally wear 2 L of oxygen at night, so this is not a new oxygen requirement.    The labs revealed elevated reticulocyte count of 29.5 which was elevated from two weeks ago. His LDH was 452, but this was decreased from 804 two weeks ago.  Troponin was negative. Patient has a mild leukocytosis of 12.5 but there is no evidence of any infection at this time. His hemoglobin is consistent with his baseline.     Patient was informed of the results. Due to his persistent pain, the patient will be admitted to the Medicine service for further management and monitoring of his sickle cell pain crisis. Patient remains stable and is  agreeable to the plan of care.         ----    Differential diagnoses considered include but are not limited to: sickle cell crisis, acute chest syndrome, pneumonia, PE     EKG Independent Interpretation: EKG interpreted by myself. Please see ED Course for full interpretation.    Independent Result Review and Interpretation: Relevant laboratory and radiographic results were reviewed and independently interpreted by myself.  As necessary, they are commented on in the ED Course.    Chronic conditions affecting the patient's care: As documented above in [facility]    The patient was discussed with the following consultants/services: Admission Coordinator who accepted the patient for admission    Care Considerations: As documented above in [facility]    ED Course:  ED Course as of 07/08/24 0603 Mon Jul 08, 2024 0247 EKG shows rate of 64, normal sinus rhythm, no axis deviation, QT interval of 390. T-wave inversion in aVR which is normal. [AC]   0319 Patient 02 dropped to 84%. The nurse placed him on 2 L nasal cannula and his O2 saturation improved to 99%. The patient states that he does wear 2 L nasal cannula at home at night only. He is currently still experiencing 9/10 pain after the 2nd dose of Dilaudid. [AC]   0334 Retic %(!): 29.5  Noted to be elevated which is consistent with sickle cell crisis. [AC]   0337 CBC and Auto Differential(!)  Patient has mildly elevated leukocyte count which could be secondary to inflammatory response from sickle-cell crisis. [AC]   0339 XR chest 2 views  No acute pulmonary process were noted [AC]   0519 Patient reports minimal pain relief with Toradol and Dilaudid doses. [AC]      ED Course User Index  [AC] Kush Connelly DO         Diagnoses as of 07/08/24 0603   Sickle cell crisis (Multi)   Sickle cell disease with crisis (Multi)     Disposition   As a result of their workup, the patient will require admission to the hospital for continued pain management of his sickle cell crisis.  The  patient was informed of his diagnosis.  The patient was given the opportunity to ask questions and I answered them. The patient agreed to be admitted to the hospital.    Procedures   Procedures    This was a shared visit with an ED attending.  The patient was seen and discussed with the ED attending    Kush Connelly DO  Emergency Medicine     Kush Connelly DO  Resident  07/09/24 1300       Brooklyn Squires MD  07/09/24 3527

## 2024-07-09 LAB
ALBUMIN SERPL BCP-MCNC: 4.3 G/DL (ref 3.4–5)
ALP SERPL-CCNC: 111 U/L (ref 33–120)
ALT SERPL W P-5'-P-CCNC: 26 U/L (ref 10–52)
ANION GAP SERPL CALC-SCNC: 14 MMOL/L (ref 10–20)
AST SERPL W P-5'-P-CCNC: 51 U/L (ref 9–39)
BASOPHILS # BLD AUTO: 0.03 X10*3/UL (ref 0–0.1)
BASOPHILS NFR BLD AUTO: 0.4 %
BILIRUB SERPL-MCNC: 7.3 MG/DL (ref 0–1.2)
BUN SERPL-MCNC: 5 MG/DL (ref 6–23)
CALCIUM SERPL-MCNC: 9.2 MG/DL (ref 8.6–10.6)
CHLORIDE SERPL-SCNC: 105 MMOL/L (ref 98–107)
CO2 SERPL-SCNC: 23 MMOL/L (ref 21–32)
CREAT SERPL-MCNC: 0.54 MG/DL (ref 0.5–1.3)
EGFRCR SERPLBLD CKD-EPI 2021: >90 ML/MIN/1.73M*2
EOSINOPHIL # BLD AUTO: 0.35 X10*3/UL (ref 0–0.7)
EOSINOPHIL NFR BLD AUTO: 4.3 %
ERYTHROCYTE [DISTWIDTH] IN BLOOD BY AUTOMATED COUNT: 20.6 % (ref 11.5–14.5)
GLUCOSE SERPL-MCNC: 70 MG/DL (ref 74–99)
HCT VFR BLD AUTO: 21 % (ref 41–52)
HEMOGLOBIN A2: 3.5 % (ref 2–3.5)
HEMOGLOBIN A: 20.2 % (ref 95.8–98)
HEMOGLOBIN F: 2.1 % (ref 0–2)
HEMOGLOBIN IDENTIFICATION INTERPRETATION: ABNORMAL
HEMOGLOBIN S: 74.2 %
HGB BLD-MCNC: 7.7 G/DL (ref 13.5–17.5)
HGB RETIC QN: 33 PG (ref 28–38)
IMM GRANULOCYTES # BLD AUTO: 0.12 X10*3/UL (ref 0–0.7)
IMM GRANULOCYTES NFR BLD AUTO: 1.5 % (ref 0–0.9)
IMMATURE RETIC FRACTION: 16.1 %
LDH SERPL L TO P-CCNC: 475 U/L (ref 84–246)
LYMPHOCYTES # BLD AUTO: 1.91 X10*3/UL (ref 1.2–4.8)
LYMPHOCYTES NFR BLD AUTO: 23.5 %
MCH RBC QN AUTO: 31.4 PG (ref 26–34)
MCHC RBC AUTO-ENTMCNC: 36.7 G/DL (ref 32–36)
MCV RBC AUTO: 86 FL (ref 80–100)
MONOCYTES # BLD AUTO: 1.17 X10*3/UL (ref 0.1–1)
MONOCYTES NFR BLD AUTO: 14.4 %
NEUTROPHILS # BLD AUTO: 4.54 X10*3/UL (ref 1.2–7.7)
NEUTROPHILS NFR BLD AUTO: 55.9 %
NRBC BLD-RTO: 2.2 /100 WBCS (ref 0–0)
PAPPENHEIMER BOD BLD QL SMEAR: PRESENT
PATH REVIEW-HGB IDENTIFICATION: ABNORMAL
PLATELET # BLD AUTO: 387 X10*3/UL (ref 150–450)
POLYCHROMASIA BLD QL SMEAR: NORMAL
POTASSIUM SERPL-SCNC: 4 MMOL/L (ref 3.5–5.3)
PROT SERPL-MCNC: 6.7 G/DL (ref 6.4–8.2)
RBC # BLD AUTO: 2.45 X10*6/UL (ref 4.5–5.9)
RBC MORPH BLD: NORMAL
RETICS #: 0.82 X10*6/UL (ref 0.02–0.12)
RETICS/RBC NFR AUTO: 33.4 % (ref 0.5–2)
SICKLE CELLS BLD QL SMEAR: NORMAL
SODIUM SERPL-SCNC: 138 MMOL/L (ref 136–145)
TARGETS BLD QL SMEAR: NORMAL
WBC # BLD AUTO: 8.1 X10*3/UL (ref 4.4–11.3)

## 2024-07-09 PROCEDURE — 36415 COLL VENOUS BLD VENIPUNCTURE: CPT | Performed by: NURSE PRACTITIONER

## 2024-07-09 PROCEDURE — 85045 AUTOMATED RETICULOCYTE COUNT: CPT | Performed by: NURSE PRACTITIONER

## 2024-07-09 PROCEDURE — 99233 SBSQ HOSP IP/OBS HIGH 50: CPT | Performed by: NURSE PRACTITIONER

## 2024-07-09 PROCEDURE — 85025 COMPLETE CBC W/AUTO DIFF WBC: CPT | Performed by: NURSE PRACTITIONER

## 2024-07-09 PROCEDURE — 1170000001 HC PRIVATE ONCOLOGY ROOM DAILY

## 2024-07-09 PROCEDURE — 80053 COMPREHEN METABOLIC PANEL: CPT | Performed by: NURSE PRACTITIONER

## 2024-07-09 PROCEDURE — 2500000001 HC RX 250 WO HCPCS SELF ADMINISTERED DRUGS (ALT 637 FOR MEDICARE OP): Performed by: NURSE PRACTITIONER

## 2024-07-09 PROCEDURE — 83615 LACTATE (LD) (LDH) ENZYME: CPT | Performed by: NURSE PRACTITIONER

## 2024-07-09 PROCEDURE — 2500000005 HC RX 250 GENERAL PHARMACY W/O HCPCS: Performed by: NURSE PRACTITIONER

## 2024-07-09 PROCEDURE — 2500000004 HC RX 250 GENERAL PHARMACY W/ HCPCS (ALT 636 FOR OP/ED): Performed by: NURSE PRACTITIONER

## 2024-07-09 ASSESSMENT — PAIN SCALES - GENERAL
PAINLEVEL_OUTOF10: 7
PAINLEVEL_OUTOF10: 9
PAINLEVEL_OUTOF10: 7
PAINLEVEL_OUTOF10: 9
PAINLEVEL_OUTOF10: 7
PAINLEVEL_OUTOF10: 8
PAINLEVEL_OUTOF10: 6
PAINLEVEL_OUTOF10: 9
PAINLEVEL_OUTOF10: 8
PAINLEVEL_OUTOF10: 8
PAINLEVEL_OUTOF10: 9
PAINLEVEL_OUTOF10: 9
PAINLEVEL_OUTOF10: 7
PAINLEVEL_OUTOF10: 6
PAINLEVEL_OUTOF10: 10 - WORST POSSIBLE PAIN
PAINLEVEL_OUTOF10: 8
PAINLEVEL_OUTOF10: 8

## 2024-07-09 ASSESSMENT — COGNITIVE AND FUNCTIONAL STATUS - GENERAL
MOBILITY SCORE: 24
DAILY ACTIVITIY SCORE: 24
DAILY ACTIVITIY SCORE: 24
MOBILITY SCORE: 24

## 2024-07-09 ASSESSMENT — PAIN - FUNCTIONAL ASSESSMENT
PAIN_FUNCTIONAL_ASSESSMENT: 0-10
PAIN_FUNCTIONAL_ASSESSMENT: 0-10

## 2024-07-09 ASSESSMENT — PAIN DESCRIPTION - LOCATION: LOCATION: CHEST

## 2024-07-09 NOTE — PROGRESS NOTES
07/09/24 0948   Discharge Planning   Who is requesting discharge planning? Provider   Home or Post Acute Services None   Patient expects to be discharged to: home   Does the patient need discharge transport arranged? No     7/9/24 @ 0950  [facility] Note    Plan per Medical/Surgical Team: sickle cell pain management  Status: Inpatient  Payor Source: [place]  Discharge disposition: home  Expected date of discharge: 7/11/24  Barriers: none    Anticipate no needs at discharge. Patient already established with [facility] for his oxygen needs. Will continue to follow patient for any discharge needs.   Mari Ko RN [facility]

## 2024-07-09 NOTE — PROGRESS NOTES
"Joellen Narayan is a 23 y.o. male on day 1 of admission presenting with Sickle cell crisis (Multi).    Subjective   Seen this AM at the bedside. Pt reports that his pain is getting a little better today. Having some relief with IV dilaudid. Not interested in increasing pain meds or weaning pain meds today. Having BMs, last BM yesterday 7/8. No fevers/chills, SOB, or n/v.     Objective     Physical Exam  Vitals reviewed.   Constitutional:       Appearance: Normal appearance.   HENT:      Head: Normocephalic and atraumatic.      Nose: Nose normal.      Mouth/Throat:      Mouth: Mucous membranes are moist.      Pharynx: Oropharynx is clear.   Eyes:      General: Scleral icterus present.      Extraocular Movements: Extraocular movements intact.      Pupils: Pupils are equal, round, and reactive to light.   Cardiovascular:      Rate and Rhythm: Normal rate and regular rhythm.      Pulses: Normal pulses.      Heart sounds: Normal heart sounds.   Pulmonary:      Effort: Pulmonary effort is normal.      Breath sounds: Normal breath sounds.   Abdominal:      General: Bowel sounds are normal.      Palpations: Abdomen is soft.   Musculoskeletal:         General: Normal range of motion.   Skin:     General: Skin is warm.   Neurological:      General: No focal deficit present.      Mental Status: He is alert and oriented to person, place, and time. Mental status is at baseline.   Psychiatric:         Mood and Affect: Mood normal.         Behavior: Behavior normal.       Last Recorded Vitals  Blood pressure 112/66, pulse 70, temperature 36.3 °C (97.3 °F), resp. rate 16, height 1.88 m (6' 2\"), weight 72.6 kg (160 lb), SpO2 94%.  Intake/Output last 3 Shifts:  No intake/output data recorded.    Results for orders placed or performed during the hospital encounter of 07/08/24 (from the past 24 hour(s))   Type And Screen   Result Value Ref Range    ABO TYPE B     Rh TYPE POS     ANTIBODY SCREEN NEG    CBC and Auto Differential   Result " Value Ref Range    WBC 11.3 4.4 - 11.3 x10*3/uL    nRBC 2.1 (H) 0.0 - 0.0 /100 WBCs    RBC 2.68 (L) 4.50 - 5.90 x10*6/uL    Hemoglobin 8.4 (L) 13.5 - 17.5 g/dL    Hematocrit 22.4 (L) 41.0 - 52.0 %    MCV 84 80 - 100 fL    MCH 31.3 26.0 - 34.0 pg    MCHC 37.5 (H) 32.0 - 36.0 g/dL    RDW 20.9 (H) 11.5 - 14.5 %    Platelets 462 (H) 150 - 450 x10*3/uL    Immature Granulocytes %, Automated 0.6 0.0 - 0.9 %    Immature Granulocytes Absolute, Automated 0.07 0.00 - 0.70 x10*3/uL   Lactate Dehydrogenase   Result Value Ref Range     (H) 84 - 246 U/L   Reticulocytes   Result Value Ref Range    Retic % 30.2 (H) 0.5 - 2.0 %    Retic Absolute 0.809 (H) 0.022 - 0.118 x10*6/uL    Reticulocyte Hemoglobin 31 28 - 38 pg    Immature Retic fraction 22.8 (H) <=16.0 %   Comprehensive Metabolic Panel   Result Value Ref Range    Glucose 97 74 - 99 mg/dL    Sodium 139 136 - 145 mmol/L    Potassium 3.7 3.5 - 5.3 mmol/L    Chloride 105 98 - 107 mmol/L    Bicarbonate 25 21 - 32 mmol/L    Anion Gap 13 10 - 20 mmol/L    Urea Nitrogen 7 6 - 23 mg/dL    Creatinine 0.55 0.50 - 1.30 mg/dL    eGFR >90 >60 mL/min/1.73m*2    Calcium 9.3 8.6 - 10.6 mg/dL    Albumin 4.4 3.4 - 5.0 g/dL    Alkaline Phosphatase 116 33 - 120 U/L    Total Protein 6.7 6.4 - 8.2 g/dL    AST 69 (H) 9 - 39 U/L    Bilirubin, Total 7.7 (H) 0.0 - 1.2 mg/dL    ALT 28 10 - 52 U/L   Manual Differential   Result Value Ref Range    Neutrophils %, Manual 59.0 40.0 - 80.0 %    Lymphocytes %, Manual 33.3 13.0 - 44.0 %    Monocytes %, Manual 6.0 2.0 - 10.0 %    Eosinophils %, Manual 1.7 0.0 - 6.0 %    Basophils %, Manual 0.0 0.0 - 2.0 %    Seg Neutrophils Absolute, Manual 6.67 1.20 - 7.00 x10*3/uL    Lymphocytes Absolute, Manual 3.76 1.20 - 4.80 x10*3/uL    Monocytes Absolute, Manual 0.68 0.10 - 1.00 x10*3/uL    Eosinophils Absolute, Manual 0.19 0.00 - 0.70 x10*3/uL    Basophils Absolute, Manual 0.00 0.00 - 0.10 x10*3/uL    Total Cells Counted 117     RBC Morphology See Below      Polychromasia Mild     RBC Fragments Few     Sickle Cells Few     Target Cells Few     Teardrop Cells Few     Pappenheimer Bodies Present    Screen Opiate/Opioid/Benzo Prescription Compliance   Result Value Ref Range    Creatinine, Urine Random 111.7 20.0 - 370.0 mg/dL    Amphetamine Screen, Urine Presumptive Negative Presumptive Negative    Barbiturate Screen, Urine Presumptive Negative Presumptive Negative    Cannabinoid Screen, Urine Presumptive Positive (A) Presumptive Negative    Cocaine Metabolite Screen, Urine Presumptive Negative Presumptive Negative    PCP Screen, Urine Presumptive Negative Presumptive Negative       Scheduled medications  enoxaparin, 40 mg, subcutaneous, q24h  folic acid, 1 mg, oral, Daily  ketorolac, 30 mg, intravenous, q6h REYNALDO  lidocaine, 1 patch, transdermal, Daily  pantoprazole, 40 mg, oral, Daily before breakfast  polyethylene glycol, 17 g, oral, Daily  sennosides-docusate sodium, 2 tablet, oral, BID      Continuous medications     PRN medications  PRN medications: diphenhydrAMINE, HYDROmorphone, ondansetron, oxygen       Assessment/Plan   Principal Problem:    Sickle cell crisis (Multi)    Joellen Narayan is a 23 y.o. 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 Troponin negative, EKG NSR with PACs, no T wave inversion. Pain improved after pain med initiation. CXR (7/8) without evidence of acute process. Lysis labs near pt's baseline. Admitted for further management of uncomplicated sickle cell pain crisis. Started on IV dilaudid for pain management. DC pending improvement in  pain.     # Hgb SS with severe pain  - OARRS reviewed, no aberrancies  - No current care path  - Hgb baseline ~8.5, Hgb 9.9 (7/8)--> Hgb 7.7 (7/9); will monitor  - Tbili baseline fluctuates ~5-13, Tbili 7.3 (7/8)  - LDH baseline fluctuates but ~500,  (7/8)  - Mild leukocytosis noted on admission consistent with previous admissions. WBC 12.5k (7/8)--> improved to 8.1k (7/9); likely reactive as pt afebrile with no s/sx of infectious process  - Pt with CP and SOB, however these are all consistent with pt's baseline presentation of sickle cell crisis. In the absence of imaging findings c/f infection or ACS, no wheezing, and no fevers-- low c/f ACS at this time, however will monitor  - CXR (7/8) without evidence of acute process  - Troponin (7/8): 12  - EKG (7/8): NSR with PACs, no T wave inversion,   - On admit started IV dilaudid 3mg q2hrs PRN severe pain (7/8- current)  - Started IV Toradol 30mg q6hrs x3 days (7/8- planned stop 7/11) with Protonix for PPI prophy  - Continue folic acid 1mg daily  - Utox (7/8): prelim + cannabinoid  - Hgb S (7/8): 74.2%  - PO Zofran PRN for opioid-induced nausea, PO Benadryl PRN for opioid-induced pruritus  - Bowel regimen for opioid-induced constipation with DocuSenna 2tabs BID and Miralax daily (LBM 7/8)     # Nodular lymphocyte predominant Hodgkins lymphoma (NLPHL)   - Primary Oncologist: Dr. Stoll  - Enlarged lymph nodes noted 4/1/22  - RUQ US (11/14/22) with mildly enlarged LNs in the region of the kavin hepatis  - MRI liver (11/16/22) showed re-demonstration of bulky retroperitoneal lymphadenopathy and kavin hepatic lymphadenopathy    - (11/18/22) lymph node biopsy showed atypical lymphoid infiltrate. Reviewed by Hemepath board, insufficient for lymphoma diagnosis  - PET/CT (12/6/22) showing retroperitoneal lymphadenopathy  - Followed up with Dr. Stoll (12/16/22) with plan for surg/onc consult for large tissue bx but patient missed apt and was lost to follow up  -  Requested new apt with Dr. Ronnie Marte 6/19/23, patient was not seen and lost to follow up  - CT a/p (11/28/23) increased size of retroperitoneal lymph nodes reflecting extramedullary hematopoiesis I/s/o sickle cell vs lymphoma  - Paraaortic LN bx via IR (11/30/23) consistent with NLPHL. Flow: no clonal B cell or T cell population identified, lymphocyte 95%, CD3+CD4+ 68%, CD3+CD8+ 7%, CD19+ 24%  - Elevated LDH/bili partially from sickle cell disease   - Chemotherapy (R-CHOP) was discussed with primary oncologist Dr. Stoll, and decision was made to simplify his chemotherapy to Rituxan and prednisone q3 weeks mainly due to frequent sickle cell crisis  - Current chemo regimen: rituxan and prednisone q3 weeks (C1 1/18/24, C2 2/8/24, Missed C3 d/t sickle cell pain crisis, C4 4/4/24, C5 5/16/24, C6 6/7/24)  - Per pt no longer taking Acyclovir 400mg BID prophy      # Hx Priapism  - Last episode 6/18; pt presented to the ED with Priapism. Urology consulted at that time and unable to aspirate blood, however rec no acute further interventions necessary as there was improvement with conservative management  - No active issues with priapism currently  - Sudafed q4h PRN ordered  - Pt missed urology FUV 7/2     # Hx of nocturnal oxygen dependence and hypoxia on room air  - Historically improves with oxygen supplementation  - Pt hypoxic on admission (SpO2 84% on RA while asleep), placed on 2L supp O2    - CXR (7/8) without evidence of acute process; pt additionally denies SOB  - Has not had home oxygen for 2-3 years   - Pt did not qualify for home O2 per walking pulse ox performed during previous hospital admission on 2/26/24  - Wean as able, encourage incentive spirometry  - Pulm FUV 8/5     DVT prophy: Lovenox subcutaneous, SCDs, encourage ambulation     DISPO:  - Full code, confirmed on admit  - DC pending improvement in pain  - SUSIE Narayan (Parent) [phone]  - PET/CT 7/15, Dr. Stoll 7/25, Pulm 8/5, Sickle Cell  FUV requested/pending    I spent >60 minutes in the professional and overall care of this patient.    Assessment and plan discussed with attending physician Dr. Deanna Jenkins, APRN-CNP

## 2024-07-09 NOTE — CARE PLAN
Problem: Fall/Injury  Goal: Not fall by end of shift  Outcome: Progressing  Goal: Be free from injury by end of the shift  Outcome: Progressing  Goal: Verbalize understanding of personal risk factors for fall in the hospital  Outcome: Progressing  Goal: Verbalize understanding of risk factor reduction measures to prevent injury from fall in the home  Outcome: Progressing  Goal: Use assistive devices by end of the shift  Outcome: Progressing  Goal: Pace activities to prevent fatigue by end of the shift  Outcome: Progressing     Problem: Pain  Goal: Takes deep breaths with improved pain control throughout the shift  Outcome: Progressing  Goal: Turns in bed with improved pain control throughout the shift  Outcome: Progressing  Goal: Walks with improved pain control throughout the shift  Outcome: Progressing  Goal: Performs ADL's with improved pain control throughout shift  Outcome: Progressing  Goal: Participates in PT with improved pain control throughout the shift  Outcome: Progressing  Goal: Free from opioid side effects throughout the shift  Outcome: Progressing  Goal: Free from acute confusion related to pain meds throughout the shift  Outcome: Progressing     The clinical goals for the shift include Pt will report pain less than 7/10 in response to pain medication throughout shift.

## 2024-07-09 NOTE — CARE PLAN
The patient's goals for the shift include      The clinical goals for the shift include pt will report pain less than 7/10 by end of shift      Problem: Fall/Injury  Goal: Not fall by end of shift  Outcome: Progressing  Goal: Be free from injury by end of the shift  Outcome: Progressing  Goal: Verbalize understanding of personal risk factors for fall in the hospital  Outcome: Progressing  Goal: Verbalize understanding of risk factor reduction measures to prevent injury from fall in the home  Outcome: Progressing  Goal: Use assistive devices by end of the shift  Outcome: Progressing  Goal: Pace activities to prevent fatigue by end of the shift  Outcome: Progressing     Problem: Pain  Goal: Takes deep breaths with improved pain control throughout the shift  Outcome: Progressing  Goal: Turns in bed with improved pain control throughout the shift  Outcome: Progressing  Goal: Walks with improved pain control throughout the shift  Outcome: Progressing  Goal: Performs ADL's with improved pain control throughout shift  Outcome: Progressing  Goal: Participates in PT with improved pain control throughout the shift  Outcome: Progressing  Goal: Free from opioid side effects throughout the shift  Outcome: Progressing  Goal: Free from acute confusion related to pain meds throughout the shift  Outcome: Progressing

## 2024-07-10 LAB
1OH-MIDAZOLAM UR CFM-MCNC: <25 NG/ML
6MAM UR CFM-MCNC: <25 NG/ML
7AMINOCLONAZEPAM UR CFM-MCNC: <25 NG/ML
A-OH ALPRAZ UR CFM-MCNC: <25 NG/ML
ACANTHOCYTES BLD QL SMEAR: ABNORMAL
ALBUMIN SERPL BCP-MCNC: 4.4 G/DL (ref 3.4–5)
ALP SERPL-CCNC: 139 U/L (ref 33–120)
ALPRAZ UR CFM-MCNC: <25 NG/ML
ALT SERPL W P-5'-P-CCNC: 47 U/L (ref 10–52)
ANION GAP SERPL CALC-SCNC: 16 MMOL/L (ref 10–20)
AST SERPL W P-5'-P-CCNC: 115 U/L (ref 9–39)
BASO STIPL BLD QL SMEAR: PRESENT
BASOPHILS # BLD MANUAL: 0.22 X10*3/UL (ref 0–0.1)
BASOPHILS NFR BLD MANUAL: 2.6 %
BILIRUB SERPL-MCNC: 11.6 MG/DL (ref 0–1.2)
BUN SERPL-MCNC: 4 MG/DL (ref 6–23)
BURR CELLS BLD QL SMEAR: ABNORMAL
CALCIUM SERPL-MCNC: 9.4 MG/DL (ref 8.6–10.6)
CHLORDIAZEP UR CFM-MCNC: <25 NG/ML
CHLORIDE SERPL-SCNC: 106 MMOL/L (ref 98–107)
CLONAZEPAM UR CFM-MCNC: <25 NG/ML
CO2 SERPL-SCNC: 20 MMOL/L (ref 21–32)
CODEINE UR CFM-MCNC: <50 NG/ML
CREAT SERPL-MCNC: 0.61 MG/DL (ref 0.5–1.3)
DACRYOCYTES BLD QL SMEAR: ABNORMAL
DIAZEPAM UR CFM-MCNC: <25 NG/ML
EDDP UR CFM-MCNC: <25 NG/ML
EGFRCR SERPLBLD CKD-EPI 2021: >90 ML/MIN/1.73M*2
EOSINOPHIL # BLD MANUAL: 0.29 X10*3/UL (ref 0–0.7)
EOSINOPHIL NFR BLD MANUAL: 3.4 %
ERYTHROCYTE [DISTWIDTH] IN BLOOD BY AUTOMATED COUNT: 21.5 % (ref 11.5–14.5)
FENTANYL UR CFM-MCNC: <2.5 NG/ML
GLUCOSE SERPL-MCNC: 62 MG/DL (ref 74–99)
HCT VFR BLD AUTO: 20.4 % (ref 41–52)
HGB BLD-MCNC: 7.5 G/DL (ref 13.5–17.5)
HGB RETIC QN: 28 PG (ref 28–38)
HOWELL-JOLLY BOD BLD QL SMEAR: PRESENT
HYDROCODONE CTO UR CFM-MCNC: <25 NG/ML
HYDROMORPHONE UR CFM-MCNC: >2500 NG/ML
IMM GRANULOCYTES # BLD AUTO: 0.06 X10*3/UL (ref 0–0.7)
IMM GRANULOCYTES NFR BLD AUTO: 0.7 % (ref 0–0.9)
IMMATURE RETIC FRACTION: 15.6 %
LDH SERPL L TO P-CCNC: 712 U/L (ref 84–246)
LORAZEPAM UR CFM-MCNC: <25 NG/ML
LYMPHOCYTES # BLD MANUAL: 2.18 X10*3/UL (ref 1.2–4.8)
LYMPHOCYTES NFR BLD MANUAL: 25.9 %
MCH RBC QN AUTO: 30.9 PG (ref 26–34)
MCHC RBC AUTO-ENTMCNC: 36.8 G/DL (ref 32–36)
MCV RBC AUTO: 84 FL (ref 80–100)
METHADONE UR CFM-MCNC: <25 NG/ML
MIDAZOLAM UR CFM-MCNC: <25 NG/ML
MONOCYTES # BLD MANUAL: 0.22 X10*3/UL (ref 0.1–1)
MONOCYTES NFR BLD MANUAL: 2.6 %
MORPHINE UR CFM-MCNC: <50 NG/ML
NEUTS SEG # BLD MANUAL: 5.07 X10*3/UL (ref 1.2–7)
NEUTS SEG NFR BLD MANUAL: 60.3 %
NORDIAZEPAM UR CFM-MCNC: <25 NG/ML
NORFENTANYL UR CFM-MCNC: <2.5 NG/ML
NORHYDROCODONE UR CFM-MCNC: <25 NG/ML
NOROXYCODONE UR CFM-MCNC: <25 NG/ML
NORTRAMADOL UR-MCNC: <50 NG/ML
NRBC BLD-RTO: 2.5 /100 WBCS (ref 0–0)
OVALOCYTES BLD QL SMEAR: ABNORMAL
OXAZEPAM UR CFM-MCNC: <25 NG/ML
OXYCODONE UR CFM-MCNC: <25 NG/ML
OXYMORPHONE UR CFM-MCNC: <25 NG/ML
PAPPENHEIMER BOD BLD QL SMEAR: PRESENT
PLATELET # BLD AUTO: 384 X10*3/UL (ref 150–450)
POLYCHROMASIA BLD QL SMEAR: ABNORMAL
POTASSIUM SERPL-SCNC: 4.9 MMOL/L (ref 3.5–5.3)
PROT SERPL-MCNC: 6.5 G/DL (ref 6.4–8.2)
RBC # BLD AUTO: 2.43 X10*6/UL (ref 4.5–5.9)
RBC MORPH BLD: ABNORMAL
RETICS #: 0.71 X10*6/UL (ref 0.02–0.12)
RETICS/RBC NFR AUTO: 29.2 % (ref 0.5–2)
SICKLE CELLS BLD QL SMEAR: ABNORMAL
SODIUM SERPL-SCNC: 137 MMOL/L (ref 136–145)
TARGETS BLD QL SMEAR: ABNORMAL
TEMAZEPAM UR CFM-MCNC: <25 NG/ML
TOTAL CELLS COUNTED BLD: 116
TRAMADOL UR CFM-MCNC: <50 NG/ML
VARIANT LYMPHS # BLD MANUAL: 0.44 X10*3/UL (ref 0–0.5)
VARIANT LYMPHS NFR BLD: 5.2 %
WBC # BLD AUTO: 8.4 X10*3/UL (ref 4.4–11.3)
ZOLPIDEM UR CFM-MCNC: <25 NG/ML
ZOLPIDEM UR-MCNC: <25 NG/ML

## 2024-07-10 PROCEDURE — 83615 LACTATE (LD) (LDH) ENZYME: CPT | Performed by: NURSE PRACTITIONER

## 2024-07-10 PROCEDURE — 85027 COMPLETE CBC AUTOMATED: CPT | Performed by: NURSE PRACTITIONER

## 2024-07-10 PROCEDURE — 2500000004 HC RX 250 GENERAL PHARMACY W/ HCPCS (ALT 636 FOR OP/ED)

## 2024-07-10 PROCEDURE — 82248 BILIRUBIN DIRECT: CPT

## 2024-07-10 PROCEDURE — 99233 SBSQ HOSP IP/OBS HIGH 50: CPT

## 2024-07-10 PROCEDURE — 2500000001 HC RX 250 WO HCPCS SELF ADMINISTERED DRUGS (ALT 637 FOR MEDICARE OP)

## 2024-07-10 PROCEDURE — 1170000001 HC PRIVATE ONCOLOGY ROOM DAILY

## 2024-07-10 PROCEDURE — 36415 COLL VENOUS BLD VENIPUNCTURE: CPT | Performed by: NURSE PRACTITIONER

## 2024-07-10 PROCEDURE — 84075 ASSAY ALKALINE PHOSPHATASE: CPT | Performed by: NURSE PRACTITIONER

## 2024-07-10 PROCEDURE — 85007 BL SMEAR W/DIFF WBC COUNT: CPT | Performed by: NURSE PRACTITIONER

## 2024-07-10 PROCEDURE — 2500000005 HC RX 250 GENERAL PHARMACY W/O HCPCS: Performed by: NURSE PRACTITIONER

## 2024-07-10 PROCEDURE — 2500000004 HC RX 250 GENERAL PHARMACY W/ HCPCS (ALT 636 FOR OP/ED): Performed by: NURSE PRACTITIONER

## 2024-07-10 PROCEDURE — 85045 AUTOMATED RETICULOCYTE COUNT: CPT | Performed by: NURSE PRACTITIONER

## 2024-07-10 PROCEDURE — 2500000001 HC RX 250 WO HCPCS SELF ADMINISTERED DRUGS (ALT 637 FOR MEDICARE OP): Performed by: NURSE PRACTITIONER

## 2024-07-10 RX ORDER — PROCHLORPERAZINE MALEATE 10 MG
10 TABLET ORAL EVERY 6 HOURS PRN
Status: DISCONTINUED | OUTPATIENT
Start: 2024-07-10 | End: 2024-07-11

## 2024-07-10 RX ORDER — DICLOFENAC SODIUM 10 MG/G
4 GEL TOPICAL 4 TIMES DAILY
Status: DISCONTINUED | OUTPATIENT
Start: 2024-07-10 | End: 2024-07-23 | Stop reason: HOSPADM

## 2024-07-10 RX ORDER — DEXTROSE MONOHYDRATE AND SODIUM CHLORIDE 5; .45 G/100ML; G/100ML
75 INJECTION, SOLUTION INTRAVENOUS CONTINUOUS
Status: DISCONTINUED | OUTPATIENT
Start: 2024-07-10 | End: 2024-07-11

## 2024-07-10 ASSESSMENT — PAIN - FUNCTIONAL ASSESSMENT
PAIN_FUNCTIONAL_ASSESSMENT: 0-10

## 2024-07-10 ASSESSMENT — PAIN SCALES - GENERAL
PAINLEVEL_OUTOF10: 8
PAINLEVEL_OUTOF10: 9
PAINLEVEL_OUTOF10: 7
PAINLEVEL_OUTOF10: 8
PAINLEVEL_OUTOF10: 8
PAINLEVEL_OUTOF10: 9
PAINLEVEL_OUTOF10: 8
PAINLEVEL_OUTOF10: 9
PAINLEVEL_OUTOF10: 9
PAINLEVEL_OUTOF10: 8
PAINLEVEL_OUTOF10: 9
PAINLEVEL_OUTOF10: 8
PAINLEVEL_OUTOF10: 8

## 2024-07-10 ASSESSMENT — PAIN DESCRIPTION - LOCATION
LOCATION: CHEST

## 2024-07-10 ASSESSMENT — PAIN DESCRIPTION - ORIENTATION: ORIENTATION: MID

## 2024-07-10 ASSESSMENT — PAIN SCALES - PAIN ASSESSMENT IN ADVANCED DEMENTIA (PAINAD): TOTALSCORE: MEDICATION (SEE MAR)

## 2024-07-10 NOTE — PROGRESS NOTES
Joellen Narayan is a 23 y.o. male on day 2 of admission presenting with Sickle cell crisis (Multi).    Subjective   Pt seen at bedside this AM. Reports pain was difficult to manage overnight, offered adjustment to pain medicine regimen at this time as he is known to our service and usually comfortable on his 3mg dosing, however pt currently okay with keeping same regimen. We discussed adding a breakthrough dose for this AM, he is agreeable. Pt inquiring into social work issues he has been having, provider will notify our inpatient and outpatient team. He endorses some nausea that is unrelieved by zofran, we discussed adding compazine. He is having daily Bms and tolerating a diet somewhat.     Denies fever, chills, SOB, constipation, diarrhea, abd pain, edema, bleeding, melena, dysuria, priapism, or headaches.        Objective     Physical Exam  Constitutional:       Appearance: Normal appearance.   HENT:      Head: Normocephalic.      Right Ear: External ear normal.      Left Ear: External ear normal.      Nose: Nose normal.   Eyes:      Extraocular Movements: Extraocular movements intact.      Conjunctiva/sclera: Conjunctivae normal.      Pupils: Pupils are equal, round, and reactive to light.   Cardiovascular:      Rate and Rhythm: Normal rate and regular rhythm.   Pulmonary:      Effort: Pulmonary effort is normal.      Breath sounds: Normal breath sounds.   Abdominal:      General: Abdomen is flat. Bowel sounds are normal.      Palpations: Abdomen is soft.   Musculoskeletal:         General: Normal range of motion.      Cervical back: Normal range of motion and neck supple.   Skin:     General: Skin is warm and dry.   Neurological:      General: No focal deficit present.      Mental Status: He is alert and oriented to person, place, and time. Mental status is at baseline.   Psychiatric:         Mood and Affect: Mood normal.         Behavior: Behavior normal.         Thought Content: Thought content normal.  "        Last Recorded Vitals  Blood pressure 94/55, pulse 64, temperature 36.4 °C (97.5 °F), temperature source Temporal, resp. rate 16, height 1.88 m (6' 2\"), weight 72.6 kg (160 lb), SpO2 98%.  Intake/Output last 3 Shifts:  No intake/output data recorded.    Relevant Results  .Scheduled medications  enoxaparin, 40 mg, subcutaneous, q24h  folic acid, 1 mg, oral, Daily  HYDROmorphone, 3 mg, intravenous, Once  ketorolac, 30 mg, intravenous, q6h REYNALDO  lidocaine, 1 patch, transdermal, Daily  pantoprazole, 40 mg, oral, Daily before breakfast  polyethylene glycol, 17 g, oral, Daily  sennosides-docusate sodium, 2 tablet, oral, BID      Continuous medications     PRN medications  PRN medications: diphenhydrAMINE, HYDROmorphone, ondansetron, oxygen, prochlorperazine    .No results found for this or any previous visit (from the past 24 hour(s)).      Assessment/Plan   Principal Problem:    Sickle cell crisis (Multi)    Joellen Narayan is a 23 y.o. 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 Troponin negative, EKG NSR with PACs, no T wave inversion. Pain improved after pain med initiation. CXR (7/8) without evidence of acute process. Lysis labs near pt's baseline. Admitted for further management of uncomplicated sickle cell pain crisis. Started on IV dilaudid for pain management (7/8- current). DC home resumed O2 pending improvement in pain.     # Hgb SS with acute on chronic pain  - OARRS reviewed, no aberrancies  - No current care path  - Hgb baseline ~8.5, Hgb 9.9 (7/8)--> Hgb 7.7 (7/9); will " monitor  - Tbili baseline fluctuates ~5-13, Tbili 7.3 (7/8)  - LDH baseline fluctuates but ~500,  (7/8)  - Mild leukocytosis noted on admission consistent with previous admissions. WBC 12.5k (7/8)--> improved to 8.1k (7/9); likely reactive as pt afebrile with no s/sx of infectious process; awaiting 7/10 AM labs   - Pt with CP and SOB, however these are all consistent with pt's baseline presentation of sickle cell crisis. In the absence of imaging findings c/f infection or ACS, no wheezing, and no fevers-- low c/f ACS at this time, however will monitor  - CXR (7/8) without evidence of acute process  - Troponin (7/8): 12  - EKG (7/8): NSR with PACs, no T wave inversion,   - On admit started IV dilaudid 3mg q2hrs PRN severe pain (7/8- current)  - Started IV Toradol 30mg q6hrs x3 days (7/8- planned stop 7/11) with Protonix for PPI prophy  - Supportive care: lidocaine patch, voltaren gel x4/da, hot packs   - Continue home folic acid 1mg daily  - Utox (7/8): prelim + cannabinoid  - Hgb S (7/8): 74.2%  - PO Zofran and compazine PRN for opioid-induced nausea, PO Benadryl PRN for opioid-induced pruritus  - Bowel regimen for opioid-induced constipation with DocuSenna 2tabs BID and Miralax daily (LBM 7/9)     # Nodular lymphocyte predominant Hodgkins lymphoma (NLPHL)   - Primary Oncologist: Dr. Stoll  - Enlarged lymph nodes noted 4/1/22  - RUQ US (11/14/22) with mildly enlarged LNs in the region of the kavin hepatis  - MRI liver (11/16/22) showed re-demonstration of bulky retroperitoneal lymphadenopathy and kavin hepatic lymphadenopathy    - (11/18/22) lymph node biopsy showed atypical lymphoid infiltrate. Reviewed by Hemepath board, insufficient for lymphoma diagnosis  - PET/CT (12/6/22) showing retroperitoneal lymphadenopathy  - Followed up with Dr. Stoll (12/16/22) with plan for surg/onc consult for large tissue bx but patient missed apt and was lost to follow up  - Requested new apt with Dr. Ronnie Marte  6/19/23, patient was not seen and lost to follow up  - CT a/p (11/28/23) increased size of retroperitoneal lymph nodes reflecting extramedullary hematopoiesis I/s/o sickle cell vs lymphoma  - Paraaortic LN bx via IR (11/30/23) consistent with NLPHL. Flow: no clonal B cell or T cell population identified, lymphocyte 95%, CD3+CD4+ 68%, CD3+CD8+ 7%, CD19+ 24%  - Elevated LDH/bili partially from sickle cell disease   - Chemotherapy (R-CHOP) was discussed with primary oncologist Dr. Stoll, and decision was made to simplify his chemotherapy to Rituxan and prednisone q3 weeks mainly due to frequent sickle cell crisis  - Current chemo regimen: rituxan and prednisone q3 weeks (C1 1/18/24, C2 2/8/24, Missed C3 d/t sickle cell pain crisis, C4 4/4/24, C5 5/16/24, C6 6/7/24)  - Per pt no longer taking Acyclovir 400mg BID prophy      # Hx Priapism  - Last episode 6/18; pt presented to the ED with Priapism. Urology consulted at that time and unable to aspirate blood, however rec no acute further interventions necessary as there was improvement with conservative management  - No active issues with priapism currently  - Sudafed q4h PRN ordered  - Pt missed urology FUV 7/2     # Hx of nocturnal oxygen dependence and hypoxia on room air  - Historically improves with oxygen supplementation  - Pt hypoxic on admission (SpO2 84% on RA while asleep), placed on 2L supp O2    - CXR (7/8) without evidence of acute process; pt additionally denies SOB  - Has not had home oxygen for 2-3 years   - Pt did not qualify for home O2 per walking pulse ox performed during previous hospital admission on 2/26/24  - Wean as able, encourage incentive spirometry  - 6/2024 failed x1 walking pulse ox, 6/25 pt was on RA however failed walking pulse ox and was sent home on 2L continuous O2   - Pulm FUV 8/5     DVT prophy: Lovenox subcutaneous, SCDs, encourage ambulation     DISPO:  - Full code, confirmed on admit  - DC home resumed O2 pending improvement in  pain  - NOK: Melissalencho Cintronnt (Parent) [phone]  - FUV PET/CT 7/15, Dr. Stoll 7/25, Pulm 8/5, Sickle Cell FUV requested/pending     I spent >60 minutes in the professional and overall care of this patient.     Assessment and plan discussed with attending physician Dr. Deanna Matt, APRN-CNP

## 2024-07-10 NOTE — CARE PLAN
The patient's goals for the shift include      The clinical goals for the shift include Pt will report a decrease in pay by end of shift 7/10 @ 0700

## 2024-07-11 ENCOUNTER — APPOINTMENT (OUTPATIENT)
Dept: RADIOLOGY | Facility: HOSPITAL | Age: 24
DRG: 812 | End: 2024-07-11
Payer: COMMERCIAL

## 2024-07-11 ENCOUNTER — OFFICE VISIT (OUTPATIENT)
Dept: HEMATOLOGY/ONCOLOGY | Facility: HOSPITAL | Age: 24
DRG: 812 | End: 2024-07-11
Payer: COMMERCIAL

## 2024-07-11 LAB
ATRIAL RATE: 73 BPM
BILIRUB DIRECT SERPL-MCNC: 3.3 MG/DL (ref 0–0.3)
CARBOXYTHC UR-MCNC: >500 NG/ML
P AXIS: -89 DEGREES
P OFFSET: 191 MS
P ONSET: 126 MS
PR INTERVAL: 184 MS
Q ONSET: 218 MS
QRS COUNT: 12 BEATS
QRS DURATION: 98 MS
QT INTERVAL: 372 MS
QTC CALCULATION(BAZETT): 409 MS
QTC FREDERICIA: 397 MS
R AXIS: 76 DEGREES
T AXIS: -27 DEGREES
T OFFSET: 404 MS
VENTRICULAR RATE: 73 BPM

## 2024-07-11 PROCEDURE — 93005 ELECTROCARDIOGRAM TRACING: CPT

## 2024-07-11 PROCEDURE — 2500000005 HC RX 250 GENERAL PHARMACY W/O HCPCS: Performed by: NURSE PRACTITIONER

## 2024-07-11 PROCEDURE — 71045 X-RAY EXAM CHEST 1 VIEW: CPT | Performed by: RADIOLOGY

## 2024-07-11 PROCEDURE — 99233 SBSQ HOSP IP/OBS HIGH 50: CPT

## 2024-07-11 PROCEDURE — 2500000004 HC RX 250 GENERAL PHARMACY W/ HCPCS (ALT 636 FOR OP/ED)

## 2024-07-11 PROCEDURE — 2500000004 HC RX 250 GENERAL PHARMACY W/ HCPCS (ALT 636 FOR OP/ED): Performed by: NURSE PRACTITIONER

## 2024-07-11 PROCEDURE — 71045 X-RAY EXAM CHEST 1 VIEW: CPT

## 2024-07-11 PROCEDURE — 2500000001 HC RX 250 WO HCPCS SELF ADMINISTERED DRUGS (ALT 637 FOR MEDICARE OP)

## 2024-07-11 PROCEDURE — 1170000001 HC PRIVATE ONCOLOGY ROOM DAILY

## 2024-07-11 PROCEDURE — 2500000001 HC RX 250 WO HCPCS SELF ADMINISTERED DRUGS (ALT 637 FOR MEDICARE OP): Performed by: NURSE PRACTITIONER

## 2024-07-11 RX ORDER — IBUPROFEN 600 MG/1
600 TABLET ORAL EVERY 6 HOURS SCHEDULED
Status: DISCONTINUED | OUTPATIENT
Start: 2024-07-11 | End: 2024-07-11

## 2024-07-11 RX ORDER — ONDANSETRON HYDROCHLORIDE 2 MG/ML
4 INJECTION, SOLUTION INTRAVENOUS ONCE
Status: COMPLETED | OUTPATIENT
Start: 2024-07-11 | End: 2024-07-11

## 2024-07-11 RX ORDER — ONDANSETRON HYDROCHLORIDE 2 MG/ML
4 INJECTION, SOLUTION INTRAVENOUS EVERY 6 HOURS PRN
Status: DISCONTINUED | OUTPATIENT
Start: 2024-07-11 | End: 2024-07-23 | Stop reason: HOSPADM

## 2024-07-11 RX ORDER — DEXTROSE MONOHYDRATE AND SODIUM CHLORIDE 5; .45 G/100ML; G/100ML
75 INJECTION, SOLUTION INTRAVENOUS CONTINUOUS
Status: DISCONTINUED | OUTPATIENT
Start: 2024-07-11 | End: 2024-07-12

## 2024-07-11 RX ORDER — NAPROXEN 500 MG/1
500 TABLET ORAL EVERY 12 HOURS PRN
Status: DISCONTINUED | OUTPATIENT
Start: 2024-07-11 | End: 2024-07-23 | Stop reason: HOSPADM

## 2024-07-11 ASSESSMENT — PAIN SCALES - GENERAL
PAINLEVEL_OUTOF10: 2
PAINLEVEL_OUTOF10: 9
PAINLEVEL_OUTOF10: 9
PAINLEVEL_OUTOF10: 8
PAINLEVEL_OUTOF10: 9
PAINLEVEL_OUTOF10: 8
PAINLEVEL_OUTOF10: 9
PAINLEVEL_OUTOF10: 9
PAINLEVEL_OUTOF10: 8
PAINLEVEL_OUTOF10: 8

## 2024-07-11 ASSESSMENT — PAIN - FUNCTIONAL ASSESSMENT
PAIN_FUNCTIONAL_ASSESSMENT: 0-10

## 2024-07-11 ASSESSMENT — PAIN DESCRIPTION - LOCATION
LOCATION: CHEST

## 2024-07-11 NOTE — PROGRESS NOTES
"Joellen Narayan is a 23 y.o. male on day 3 of admission presenting with Sickle cell crisis (Multi).    Subjective   Pt seen at bedside, reports had a difficult night d/t pain and some vomiting. Per nightshift provider, pt was crying inconsolably overnight d/t pain and seen at bedside for it. Pt worried pain medicine not helping but also somehwat hesitant to increase dose. Offered PCA for management, pt declines at this time, okay for opting increase to 4mg at this time (through chart review, has been on this dose prior). We discussed awaiting AM labs and CXR results.     Denies fever, chills, constipation, diarrhea, abd pain, bleeding, headaches, priapism, edema, or        Objective     Physical Exam  HENT:      Head: Normocephalic.      Right Ear: External ear normal.      Left Ear: External ear normal.      Nose: Nose normal.   Eyes:      General: Scleral icterus present.      Extraocular Movements: Extraocular movements intact.   Cardiovascular:      Rate and Rhythm: Normal rate and regular rhythm.   Pulmonary:      Effort: Pulmonary effort is normal.      Breath sounds: Normal breath sounds.   Abdominal:      General: Bowel sounds are normal.      Palpations: Abdomen is soft.   Musculoskeletal:      Cervical back: Normal range of motion and neck supple.   Skin:     General: Skin is warm and dry.   Neurological:      Mental Status: He is alert and oriented to person, place, and time. Mental status is at baseline.   Psychiatric:         Mood and Affect: Mood normal.         Behavior: Behavior normal.         Thought Content: Thought content normal.         Last Recorded Vitals  Blood pressure 126/63, pulse 71, temperature 36.9 °C (98.4 °F), temperature source Temporal, resp. rate 18, height 1.88 m (6' 2\"), weight 72.6 kg (160 lb), SpO2 94%.  Intake/Output last 3 Shifts:  I/O last 3 completed shifts:  In: 665 (9.2 mL/kg) [I.V.:665 (9.2 mL/kg)]  Out: - (0 mL/kg)   Weight: 72.6 kg     Relevant Results     .Scheduled " medications  diclofenac sodium, 4 g, Topical, 4x daily  enoxaparin, 40 mg, subcutaneous, q24h  folic acid, 1 mg, oral, Daily  ibuprofen, 600 mg, oral, q6h REYNALDO  lidocaine, 1 patch, transdermal, Daily  pantoprazole, 40 mg, oral, Daily before breakfast  polyethylene glycol, 17 g, oral, Daily  sennosides-docusate sodium, 2 tablet, oral, BID      Continuous medications  dextrose 5%-0.45 % sodium chloride, 75 mL/hr, Last Rate: 75 mL/hr (07/10/24 2156)      PRN medications  PRN medications: diphenhydrAMINE, HYDROmorphone, ondansetron, oxygen, prochlorperazine    .No results found for this or any previous visit (from the past 24 hour(s)).        Assessment/Plan   Principal Problem:    Sickle cell crisis (Multi)    Joellen Narayan is a 23 y.o. 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 7/8 Troponin negative, EKG NSR with PACs, no T wave inversion. CXR (7/8) without evidence of acute process. Lysis labs near pt's baseline, however as of 7/10 slightly uptrending. Started on IV dilaudid for pain management (7/8- current). 7/11 Repeat CXR pending. DC home resumed O2 pending improvement in pain.     # Hgb SS with acute on chronic pain  - OARRS reviewed, no aberrancies  - No current care path  - Most recent urgent RBC exchange Feb 2024 for Acute Chest Syndrome  - Hgb baseline ~ 7-8, Hgb 9.9 (7/8)--> Hgb 7.5 (7/10), no current indication for simple transfusion at this time, awaiting 7/11 AM labs   - LDH baseline fluctuates but ~500,  (7/8)  - Mild leukocytosis noted on  admission consistent with previous admissions. WBC 12.5k (7/8)--> improved to 8.4 (7/10); likely reactive as pt afebrile with no s/sx of infectious process   - Pt with CP and SOB, however these are all consistent with pt's baseline presentation of sickle cell crisis. In the absence of imaging findings c/f infection or ACS, no wheezing, and no fevers-- low c/f ACS at this time, however will closely monitor  - CXR (7/8) without evidence of acute process  - 7/11 overnight repeat CXR results pending  - Troponin (7/8): 12  - EKG (7/8): NSR with PACs, no T wave inversion,   - On admit started IV dilaudid 3mg q2hrs PRN severe pain (7/8- 7/10), increased to 3.5mg IVP in afternoon d/t unrelieved pain (7/10-7/11), pt declining PCA  - 7/11 Increased to 4mg dilaudid q2 PRN severe pain  - Started IV Toradol 30mg q6hrs x3 days (7/8- planned stop 7/11) with Protonix for PPI prophy  - Supportive care: lidocaine patch, voltaren gel x4/day, hot packs   - Continue home folic acid 1mg daily  - Utox (7/8): prelim + cannabinoid  - Hgb S (7/8) 74.2%  - PO Zofran and compazine PRN for opioid-induced nausea, PO Benadryl PRN for opioid-induced pruritus  - Bowel regimen for opioid-induced constipation with DocuSenna 2tabs BID and Miralax daily (LBM 7/10)     # transaminitis  # hyperbilirubinemia  - Baseline tbili ~ 6-7  - Likely reactive 2/2 crisis vs c/f developing sickle hepatopathy   - (7/8) on admit , ALT 28, AST 69, tbili 7.7 --> (7/10) increased 139/47/115/bili 11.6   - Pt denies RUQ pain, no fever/chills noted (7/10-current)   - Direct tbili 3.3 (7/10)  - c/w D51/2NS @ 75 (7/10- current)  - if continues to uptrend, low threshold to obtain RUQ     # Nodular lymphocyte predominant Hodgkins lymphoma (NLPHL)   - Primary Oncologist: Dr. Stoll  - Enlarged lymph nodes noted 4/1/22  - RUQ US (11/14/22) with mildly enlarged LNs in the region of the kavin hepatis  - MRI liver (11/16/22) showed re-demonstration of bulky  retroperitoneal lymphadenopathy and kavin hepatic lymphadenopathy    - (11/18/22) lymph node biopsy showed atypical lymphoid infiltrate. Reviewed by Hemepath board, insufficient for lymphoma diagnosis  - PET/CT (12/6/22) showing retroperitoneal lymphadenopathy  - Followed up with Dr. Stoll (12/16/22) with plan for surg/onc consult for large tissue bx but patient missed apt and was lost to follow up  - Requested new apt with Dr. Ronnie Marte 6/19/23, patient was not seen and lost to follow up  - CT a/p (11/28/23) increased size of retroperitoneal lymph nodes reflecting extramedullary hematopoiesis I/s/o sickle cell vs lymphoma  - Paraaortic LN bx via IR (11/30/23) consistent with NLPHL. Flow: no clonal B cell or T cell population identified, lymphocyte 95%, CD3+CD4+ 68%, CD3+CD8+ 7%, CD19+ 24%  - Elevated LDH/bili partially from sickle cell disease   - Chemotherapy (R-CHOP) was discussed with primary oncologist Dr. Stoll, and decision was made to simplify his chemotherapy to Rituxan and prednisone q3 weeks mainly due to frequent sickle cell crisis  - Current chemo regimen: rituxan and prednisone q3 weeks (C1 1/18/24, C2 2/8/24, Missed C3 d/t sickle cell pain crisis, C4 4/4/24, C5 5/16/24, C6 6/7/24)  - Per pt no longer taking Acyclovir 400mg BID prophy      # Hx Priapism  - Last episode 6/18; pt presented to the ED with Priapism. Urology consulted at that time and unable to aspirate blood, however rec no acute further interventions necessary as there was improvement with conservative management  - No active issues with priapism currently  - Can order Sudafed q4h PRN if reoccurs   - Pt missed urology FUV 7/2     # Hx of nocturnal oxygen dependence and hypoxia on room air  - Historically improves with oxygen supplementation  - Pt hypoxic on admission (SpO2 84% on RA while asleep), placed on 2L supp O2    - CXR (7/8) without evidence of acute process; pt additionally denies SOB  - Has not had home oxygen for 2-3 years    - Pt did not qualify for home O2 per walking pulse ox performed during previous hospital admission on 2/26/24  - Wean as able, encourage incentive spirometry  - 6/2024 failed x1 walking pulse ox, 6/25 pt was on RA however failed walking pulse ox and was sent home on 2L continuous O2   - Pulm FUV 8/5     DVT prophy: Lovenox subcutaneous, SCDs, encourage ambulation     DISPO:  - Full code, confirmed on admit  - DC home resumed O2 pending improvement in pain  - NOK: Melissa Narayan (Parent) [phone]  - FUV PET/CT 7/15, Dr. Stoll 7/25, Pulm 8/5, Sickle Cell FUV requested/pending     I spent >60 minutes in the professional and overall care of this patient.     Assessment and plan discussed with attending physician Dr. Deanna Matt, APRN-CNP

## 2024-07-11 NOTE — CARE PLAN
The patient's goals for the shift include      The clinical goals for the shift include Patient will rate pain at or below 5/10 by end of shift

## 2024-07-12 LAB
ALBUMIN SERPL BCP-MCNC: 4.2 G/DL (ref 3.4–5)
ALP SERPL-CCNC: 156 U/L (ref 33–120)
ALT SERPL W P-5'-P-CCNC: 89 U/L (ref 10–52)
ANION GAP SERPL CALC-SCNC: 13 MMOL/L (ref 10–20)
AST SERPL W P-5'-P-CCNC: 128 U/L (ref 9–39)
BASOPHILS # BLD AUTO: 0.02 X10*3/UL (ref 0–0.1)
BASOPHILS NFR BLD AUTO: 0.2 %
BILIRUB DIRECT SERPL-MCNC: 8.8 MG/DL (ref 0–0.3)
BILIRUB SERPL-MCNC: 23.2 MG/DL (ref 0–1.2)
BUN SERPL-MCNC: 5 MG/DL (ref 6–23)
CALCIUM SERPL-MCNC: 9.5 MG/DL (ref 8.6–10.6)
CHLORIDE SERPL-SCNC: 102 MMOL/L (ref 98–107)
CO2 SERPL-SCNC: 29 MMOL/L (ref 21–32)
CREAT SERPL-MCNC: 0.78 MG/DL (ref 0.5–1.3)
EGFRCR SERPLBLD CKD-EPI 2021: >90 ML/MIN/1.73M*2
EOSINOPHIL # BLD AUTO: 0.36 X10*3/UL (ref 0–0.7)
EOSINOPHIL NFR BLD AUTO: 3.7 %
ERYTHROCYTE [DISTWIDTH] IN BLOOD BY AUTOMATED COUNT: 19.9 % (ref 11.5–14.5)
GLUCOSE SERPL-MCNC: 68 MG/DL (ref 74–99)
HCT VFR BLD AUTO: 18.4 % (ref 41–52)
HGB BLD-MCNC: 6.9 G/DL (ref 13.5–17.5)
HGB RETIC QN: 31 PG (ref 28–38)
IMM GRANULOCYTES # BLD AUTO: 0.09 X10*3/UL (ref 0–0.7)
IMM GRANULOCYTES NFR BLD AUTO: 0.9 % (ref 0–0.9)
IMMATURE RETIC FRACTION: 16.7 %
LDH SERPL L TO P-CCNC: 562 U/L (ref 84–246)
LYMPHOCYTES # BLD AUTO: 1.71 X10*3/UL (ref 1.2–4.8)
LYMPHOCYTES NFR BLD AUTO: 17.4 %
MCH RBC QN AUTO: 30.7 PG (ref 26–34)
MCHC RBC AUTO-ENTMCNC: 37.5 G/DL (ref 32–36)
MCV RBC AUTO: 82 FL (ref 80–100)
MONOCYTES # BLD AUTO: 1.67 X10*3/UL (ref 0.1–1)
MONOCYTES NFR BLD AUTO: 17 %
NEUTROPHILS # BLD AUTO: 5.98 X10*3/UL (ref 1.2–7.7)
NEUTROPHILS NFR BLD AUTO: 60.8 %
NRBC BLD-RTO: 3.3 /100 WBCS (ref 0–0)
PLATELET # BLD AUTO: 370 X10*3/UL (ref 150–450)
POTASSIUM SERPL-SCNC: 4.2 MMOL/L (ref 3.5–5.3)
PROT SERPL-MCNC: 6.2 G/DL (ref 6.4–8.2)
RBC # BLD AUTO: 2.25 X10*6/UL (ref 4.5–5.9)
RETICS #: 0.51 X10*6/UL (ref 0.02–0.12)
RETICS/RBC NFR AUTO: 22.5 % (ref 0.5–2)
SODIUM SERPL-SCNC: 140 MMOL/L (ref 136–145)
WBC # BLD AUTO: 9.8 X10*3/UL (ref 4.4–11.3)

## 2024-07-12 PROCEDURE — 2500000004 HC RX 250 GENERAL PHARMACY W/ HCPCS (ALT 636 FOR OP/ED)

## 2024-07-12 PROCEDURE — 99233 SBSQ HOSP IP/OBS HIGH 50: CPT

## 2024-07-12 PROCEDURE — 36415 COLL VENOUS BLD VENIPUNCTURE: CPT | Performed by: NURSE PRACTITIONER

## 2024-07-12 PROCEDURE — 1170000001 HC PRIVATE ONCOLOGY ROOM DAILY

## 2024-07-12 PROCEDURE — 82248 BILIRUBIN DIRECT: CPT

## 2024-07-12 PROCEDURE — 85025 COMPLETE CBC W/AUTO DIFF WBC: CPT | Performed by: NURSE PRACTITIONER

## 2024-07-12 PROCEDURE — 2500000005 HC RX 250 GENERAL PHARMACY W/O HCPCS: Performed by: NURSE PRACTITIONER

## 2024-07-12 PROCEDURE — 83615 LACTATE (LD) (LDH) ENZYME: CPT | Performed by: NURSE PRACTITIONER

## 2024-07-12 PROCEDURE — 82374 ASSAY BLOOD CARBON DIOXIDE: CPT | Performed by: NURSE PRACTITIONER

## 2024-07-12 PROCEDURE — 2500000001 HC RX 250 WO HCPCS SELF ADMINISTERED DRUGS (ALT 637 FOR MEDICARE OP): Performed by: NURSE PRACTITIONER

## 2024-07-12 PROCEDURE — 85045 AUTOMATED RETICULOCYTE COUNT: CPT | Performed by: NURSE PRACTITIONER

## 2024-07-12 ASSESSMENT — PAIN SCALES - GENERAL
PAINLEVEL_OUTOF10: 8
PAINLEVEL_OUTOF10: 4
PAINLEVEL_OUTOF10: 8
PAINLEVEL_OUTOF10: 8
PAINLEVEL_OUTOF10: 9
PAINLEVEL_OUTOF10: 4
PAINLEVEL_OUTOF10: 2
PAINLEVEL_OUTOF10: 8
PAINLEVEL_OUTOF10: 9
PAINLEVEL_OUTOF10: 8
PAINLEVEL_OUTOF10: 2
PAINLEVEL_OUTOF10: 2
PAINLEVEL_OUTOF10: 8
PAINLEVEL_OUTOF10: 3
PAINLEVEL_OUTOF10: 8
PAINLEVEL_OUTOF10: 2
PAINLEVEL_OUTOF10: 8
PAINLEVEL_OUTOF10: 8
PAINLEVEL_OUTOF10: 7
PAINLEVEL_OUTOF10: 9
PAINLEVEL_OUTOF10: 8

## 2024-07-12 ASSESSMENT — COGNITIVE AND FUNCTIONAL STATUS - GENERAL
DAILY ACTIVITIY SCORE: 24
MOBILITY SCORE: 24
DAILY ACTIVITIY SCORE: 24
MOBILITY SCORE: 24

## 2024-07-12 ASSESSMENT — PAIN - FUNCTIONAL ASSESSMENT
PAIN_FUNCTIONAL_ASSESSMENT: 0-10

## 2024-07-12 NOTE — CARE PLAN
The patient's goals for the shift include Get at least 6-8 hrs of sleep/rest during shift.    The clinical goals for the shift include Pt to remain free of harm/falls, pain controlled  and continue to participate in plan of care.    Problem: Fall/Injury  Goal: Not fall by end of shift  Outcome: Progressing  Call light within reach at all times       Problem: Pain  Goal: Takes deep breaths with improved pain control throughout the shift  Outcome: Progressing  PRN Pain medication administered as needed  Pain assessed/Reassessed

## 2024-07-12 NOTE — PROGRESS NOTES
Spiritual Care Visit    Clinical Encounter Type  Visited With: Patient  Routine Visit: Follow-up  Continue Visiting: Yes     visited patient Joellen Narayan. Patient and  known to one another from previous admissions. Patient shared that overall he has been doing well, and has only one more cancer treatment to complete. Patient reflected on his job and friendships.  provided care through reflective listening, supportive conversation, and validation of feelings. Patient was appreciative of visit and did not have any needs at this time. Spiritual Care remains available as needed/requested.    Rev. Diana Álvarez MDiv, [facility]

## 2024-07-12 NOTE — SIGNIFICANT EVENT
Rounded with patient and offgoing nurse at bedside for bedside report. Call light within reach. Patient requested ice and ginger ale. Assisted with getting him ice and ginger ale. Patient expresses no other needs at this time.   
Name band;

## 2024-07-12 NOTE — CARE PLAN
The patient's goals for the shift include Get at least 6-8 hrs of sleep/rest during shift.    The clinical goals for the shift include patient will remain free of falls throughout shift

## 2024-07-12 NOTE — CARE PLAN
The patient's goals for the shift include Get at least 6-8 hrs of sleep/rest during shift.    The clinical goals for the shift include pt will remain HDS      Problem: Fall/Injury  Goal: Not fall by end of shift  Outcome: Progressing  Goal: Be free from injury by end of the shift  Outcome: Progressing  Goal: Verbalize understanding of personal risk factors for fall in the hospital  Outcome: Progressing  Goal: Verbalize understanding of risk factor reduction measures to prevent injury from fall in the home  Outcome: Progressing  Goal: Use assistive devices by end of the shift  Outcome: Progressing  Goal: Pace activities to prevent fatigue by end of the shift  Outcome: Progressing     Problem: Pain  Goal: Takes deep breaths with improved pain control throughout the shift  Outcome: Progressing  Goal: Turns in bed with improved pain control throughout the shift  Outcome: Progressing  Goal: Walks with improved pain control throughout the shift  Outcome: Progressing  Goal: Performs ADL's with improved pain control throughout shift  Outcome: Progressing  Goal: Participates in PT with improved pain control throughout the shift  Outcome: Progressing  Goal: Free from opioid side effects throughout the shift  Outcome: Progressing  Goal: Free from acute confusion related to pain meds throughout the shift  Outcome: Progressing     Problem: Pain - Adult  Goal: Verbalizes/displays adequate comfort level or baseline comfort level  Outcome: Progressing     Problem: Safety - Adult  Goal: Free from fall injury  Outcome: Progressing     Problem: Discharge Planning  Goal: Discharge to home or other facility with appropriate resources  Outcome: Progressing     Problem: Chronic Conditions and Co-morbidities  Goal: Patient's chronic conditions and co-morbidity symptoms are monitored and maintained or improved  Outcome: Progressing

## 2024-07-12 NOTE — PROGRESS NOTES
"Joellen Narayan is a 23 y.o. male on day 4 of admission presenting with Sickle cell crisis (Multi).    Subjective   NAEON. Denies N/V/D/C, chest pain, cough, shortness of breath.  Planning to wean tomorrow.        Objective     Physical Exam  Constitutional:       General: He is not in acute distress.     Appearance: He is not toxic-appearing.   HENT:      Head: Normocephalic and atraumatic.   Eyes:      General: Scleral icterus present.      Extraocular Movements: Extraocular movements intact.   Cardiovascular:      Rate and Rhythm: Normal rate and regular rhythm.   Pulmonary:      Effort: No respiratory distress.   Abdominal:      General: Abdomen is flat.      Palpations: Abdomen is soft.      Tenderness: There is no abdominal tenderness.   Musculoskeletal:         General: No swelling or tenderness.   Skin:     Coloration: Skin is not jaundiced.      Findings: No bruising or erythema.   Neurological:      Mental Status: He is oriented to person, place, and time. Mental status is at baseline.         Last Recorded Vitals  Blood pressure 126/61, pulse 75, temperature 36.7 °C (98.1 °F), temperature source Temporal, resp. rate 18, height 1.88 m (6' 2\"), weight 72.6 kg (160 lb), SpO2 93%.  Intake/Output last 3 Shifts:  I/O last 3 completed shifts:  In: 1933.8 (26.6 mL/kg) [I.V.:1933.8 (26.6 mL/kg)]  Out: - (0 mL/kg)   Weight: 72.6 kg            Assessment/Plan   Principal Problem:    Sickle cell crisis (Multi)    Joellen Narayan is a 23 y.o. 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 7/8 Troponin negative, EKG NSR with PACs, no T wave inversion. CXR (7/8) without evidence of acute process. Lysis labs near pt's baseline, however as of 7/10 slightly uptrending. Started on IV dilaudid for pain management (7/8- current). 7/11 Repeat CXR unchanged from prior. LFTs continuing to uptrend, RUQ U/S pending 7/12. DC home pending improvement in pain.     # Hgb SS with acute on chronic pain  - OARRS reviewed, no aberrancies  - No current care path  - Most recent urgent RBC exchange Feb 2024 for Acute Chest Syndrome  - Hgb baseline ~ 7-8, Hgb 9.9 (7/8)--> Hgb 7.5 (7/10) ->6.9 (7/12); no current indication for simple transfusion at this time per Dr Cruz   - LDH baseline fluctuates but ~500,  (7/8)  - Mild leukocytosis noted on admission consistent with previous admissions. WBC 12.5k (7/8)--> improved to 8.4 (7/10); likely reactive as pt afebrile with no s/sx of infectious process   - Pt with CP and SOB, however these are all consistent with pt's baseline presentation of sickle cell crisis. In the absence of imaging findings c/f infection or ACS, no wheezing, and no fevers-- low c/f ACS at this time, however will closely monitor  - CXR (7/8) without evidence of acute process  - 7/11 overnight repeat CXR unchanged from prior   - Troponin (7/8): 12  - EKG (7/8): NSR with PACs, no T wave inversion,   - On admit started IV dilaudid 3mg q2hrs PRN severe pain (7/8- 7/10), increased to 3.5mg IVP in afternoon d/t unrelieved pain (7/10-7/11), pt declining PCA  - 7/11 Increased to 4mg dilaudid q2 PRN severe pain  - S/p IV Toradol 30mg q6hrs x3 days (7/8-7/11) with Protonix for PPI prophy  - Supportive care: lidocaine patch, voltaren gel x4/day, hot packs   - Continue home folic acid 1mg daily  - Utox (7/8): prelim + cannabinoid  - Hgb S (7/8) 74.2%  - PO Zofran and compazine PRN for opioid-induced nausea, PO Benadryl PRN for opioid-induced pruritus, bowel regimen for opioid-induced  constipation with DocuSenna 2tabs BID and Miralax daily (LBM 7/10)     # transaminitis  # hyperbilirubinemia  - Baseline tbili ~ 6-7  - Likely reactive 2/2 crisis vs c/f developing sickle hepatopathy   - on admit , ALT 28, AST 69, tbili 7.7 --> (7/10) increased 139/47/115/bili 11.6 --> (7/12) 156/89/128/t bili 23.2   - Pt denies RUQ pain, no fever/chills noted (7/10-current)   - Direct tbili 3.3 (7/10) --> 8.8 (7/12)  - s/p D51/2NS @ 75 (7/10--7/11)  - RUQ U/S pending 7/12      # Nodular lymphocyte predominant Hodgkins lymphoma (NLPHL)   - Primary Oncologist: Dr. Stoll  - Enlarged lymph nodes noted 4/1/22  - RUQ US (11/14/22) with mildly enlarged LNs in the region of the kavin hepatis  - MRI liver (11/16/22) showed re-demonstration of bulky retroperitoneal lymphadenopathy and kavin hepatic lymphadenopathy    - (11/18/22) lymph node biopsy showed atypical lymphoid infiltrate. Reviewed by Hemepath board, insufficient for lymphoma diagnosis  - PET/CT (12/6/22) showing retroperitoneal lymphadenopathy  - Followed up with Dr. Stoll (12/16/22) with plan for surg/onc consult for large tissue bx but patient missed apt and was lost to follow up  - Requested new apt with Dr. Ronnie Marte 6/19/23, patient was not seen and lost to follow up  - CT a/p (11/28/23) increased size of retroperitoneal lymph nodes reflecting extramedullary hematopoiesis I/s/o sickle cell vs lymphoma  - Paraaortic LN bx via IR (11/30/23) consistent with NLPHL. Flow: no clonal B cell or T cell population identified, lymphocyte 95%, CD3+CD4+ 68%, CD3+CD8+ 7%, CD19+ 24%  - Elevated LDH/bili partially from sickle cell disease   - Chemotherapy (R-CHOP) was discussed with primary oncologist Dr. Stoll, and decision was made to simplify his chemotherapy to Rituxan and prednisone q3 weeks mainly due to frequent sickle cell crisis  - Current chemo regimen: rituxan and prednisone q3 weeks (C1 1/18/24, C2 2/8/24, Missed C3 d/t sickle cell pain crisis, C4  4/4/24, C5 5/16/24, C6 6/7/24)  - Per pt no longer taking Acyclovir 400mg BID prophy      # Hx Priapism  - Last episode 6/18; pt presented to the ED with Priapism. Urology consulted at that time and unable to aspirate blood, however rec no acute further interventions necessary as there was improvement with conservative management  - No active issues with priapism currently  - Can order Sudafed q4h PRN if reoccurs   - Pt missed urology FUV 7/2     # Hx of nocturnal oxygen dependence and hypoxia on room air  - Historically improves with oxygen supplementation  - Pt hypoxic on admission (SpO2 84% on RA while asleep), placed on 2L supp O2    - CXR (7/8) without evidence of acute process; pt additionally denies SOB  - Has not had home oxygen for 2-3 years   - Pt did not qualify for home O2 per walking pulse ox performed during previous hospital admission on 2/26/24  - Wean as able, encourage incentive spirometry  - 6/2024 failed x1 walking pulse ox, 6/25 pt was on RA however failed walking pulse ox and was sent home on 2L continuous O2   - Pulm FUV 8/5     DVT prophy: Lovenox subcutaneous, SCDs, encourage ambulation     DISPO:  - Full code, confirmed on admit  - DC home resumed O2 pending improvement in pain  - NOK: Melissa Narayan (Parent) [phone]  - FUV PET/CT 7/15, Dr. Stoll 7/25, Pulm 8/5, Sickle Cell FUV requested/pending       I spent >60 minutes in the professional and overall care of this patient.      Mary Moody, APRN-CNP

## 2024-07-13 LAB
ALBUMIN SERPL BCP-MCNC: 4.6 G/DL (ref 3.4–5)
ALP SERPL-CCNC: 170 U/L (ref 33–120)
ALT SERPL W P-5'-P-CCNC: 93 U/L (ref 10–52)
ANION GAP SERPL CALC-SCNC: 15 MMOL/L (ref 10–20)
AST SERPL W P-5'-P-CCNC: 123 U/L (ref 9–39)
BASOPHILS # BLD AUTO: 0.06 X10*3/UL (ref 0–0.1)
BASOPHILS NFR BLD AUTO: 0.5 %
BILIRUB SERPL-MCNC: 28.2 MG/DL (ref 0–1.2)
BUN SERPL-MCNC: 5 MG/DL (ref 6–23)
CALCIUM SERPL-MCNC: 10 MG/DL (ref 8.6–10.6)
CHLORIDE SERPL-SCNC: 98 MMOL/L (ref 98–107)
CO2 SERPL-SCNC: 29 MMOL/L (ref 21–32)
CREAT SERPL-MCNC: 0.64 MG/DL (ref 0.5–1.3)
EGFRCR SERPLBLD CKD-EPI 2021: >90 ML/MIN/1.73M*2
EOSINOPHIL # BLD AUTO: 0.58 X10*3/UL (ref 0–0.7)
EOSINOPHIL NFR BLD AUTO: 5 %
ERYTHROCYTE [DISTWIDTH] IN BLOOD BY AUTOMATED COUNT: 20.9 % (ref 11.5–14.5)
GLUCOSE SERPL-MCNC: 71 MG/DL (ref 74–99)
HCT VFR BLD AUTO: 19.4 % (ref 41–52)
HGB BLD-MCNC: 7.3 G/DL (ref 13.5–17.5)
HGB RETIC QN: 32 PG (ref 28–38)
IMM GRANULOCYTES # BLD AUTO: 0.24 X10*3/UL (ref 0–0.7)
IMM GRANULOCYTES NFR BLD AUTO: 2.1 % (ref 0–0.9)
IMMATURE RETIC FRACTION: 20.4 %
LDH SERPL L TO P-CCNC: 692 U/L (ref 84–246)
LYMPHOCYTES # BLD AUTO: 1.8 X10*3/UL (ref 1.2–4.8)
LYMPHOCYTES NFR BLD AUTO: 15.5 %
MCH RBC QN AUTO: 30.9 PG (ref 26–34)
MCHC RBC AUTO-ENTMCNC: 37.6 G/DL (ref 32–36)
MCV RBC AUTO: 82 FL (ref 80–100)
MONOCYTES # BLD AUTO: 1.84 X10*3/UL (ref 0.1–1)
MONOCYTES NFR BLD AUTO: 15.9 %
NEUTROPHILS # BLD AUTO: 7.06 X10*3/UL (ref 1.2–7.7)
NEUTROPHILS NFR BLD AUTO: 61 %
NRBC BLD-RTO: 5.5 /100 WBCS (ref 0–0)
PLATELET # BLD AUTO: 325 X10*3/UL (ref 150–450)
POTASSIUM SERPL-SCNC: 4.6 MMOL/L (ref 3.5–5.3)
PROT SERPL-MCNC: 6.5 G/DL (ref 6.4–8.2)
RBC # BLD AUTO: 2.36 X10*6/UL (ref 4.5–5.9)
RETICS #: 0.54 X10*6/UL (ref 0.02–0.12)
RETICS/RBC NFR AUTO: 22.7 % (ref 0.5–2)
SODIUM SERPL-SCNC: 137 MMOL/L (ref 136–145)
WBC # BLD AUTO: 11.6 X10*3/UL (ref 4.4–11.3)

## 2024-07-13 PROCEDURE — 86706 HEP B SURFACE ANTIBODY: CPT

## 2024-07-13 PROCEDURE — 85025 COMPLETE CBC W/AUTO DIFF WBC: CPT | Performed by: NURSE PRACTITIONER

## 2024-07-13 PROCEDURE — 85045 AUTOMATED RETICULOCYTE COUNT: CPT | Performed by: NURSE PRACTITIONER

## 2024-07-13 PROCEDURE — 2500000001 HC RX 250 WO HCPCS SELF ADMINISTERED DRUGS (ALT 637 FOR MEDICARE OP): Performed by: NURSE PRACTITIONER

## 2024-07-13 PROCEDURE — 80143 DRUG ASSAY ACETAMINOPHEN: CPT

## 2024-07-13 PROCEDURE — 80053 COMPREHEN METABOLIC PANEL: CPT | Performed by: NURSE PRACTITIONER

## 2024-07-13 PROCEDURE — 83615 LACTATE (LD) (LDH) ENZYME: CPT | Performed by: NURSE PRACTITIONER

## 2024-07-13 PROCEDURE — 86803 HEPATITIS C AB TEST: CPT

## 2024-07-13 PROCEDURE — 2500000004 HC RX 250 GENERAL PHARMACY W/ HCPCS (ALT 636 FOR OP/ED)

## 2024-07-13 PROCEDURE — 1170000001 HC PRIVATE ONCOLOGY ROOM DAILY

## 2024-07-13 PROCEDURE — 86704 HEP B CORE ANTIBODY TOTAL: CPT

## 2024-07-13 PROCEDURE — 36415 COLL VENOUS BLD VENIPUNCTURE: CPT | Performed by: NURSE PRACTITIONER

## 2024-07-13 PROCEDURE — 99233 SBSQ HOSP IP/OBS HIGH 50: CPT

## 2024-07-13 PROCEDURE — 2500000004 HC RX 250 GENERAL PHARMACY W/ HCPCS (ALT 636 FOR OP/ED): Performed by: NURSE PRACTITIONER

## 2024-07-13 ASSESSMENT — PAIN SCALES - GENERAL
PAINLEVEL_OUTOF10: 2
PAINLEVEL_OUTOF10: 8
PAINLEVEL_OUTOF10: 2
PAINLEVEL_OUTOF10: 8
PAINLEVEL_OUTOF10: 8
PAINLEVEL_OUTOF10: 9
PAINLEVEL_OUTOF10: 8
PAINLEVEL_OUTOF10: 8
PAINLEVEL_OUTOF10: 2
PAINLEVEL_OUTOF10: 8
PAINLEVEL_OUTOF10: 2
PAINLEVEL_OUTOF10: 8
PAINLEVEL_OUTOF10: 2
PAINLEVEL_OUTOF10: 8
PAINLEVEL_OUTOF10: 8

## 2024-07-13 ASSESSMENT — COGNITIVE AND FUNCTIONAL STATUS - GENERAL
DAILY ACTIVITIY SCORE: 24
MOBILITY SCORE: 24
MOBILITY SCORE: 24
DAILY ACTIVITIY SCORE: 24

## 2024-07-13 ASSESSMENT — PAIN - FUNCTIONAL ASSESSMENT
PAIN_FUNCTIONAL_ASSESSMENT: 0-10
PAIN_FUNCTIONAL_ASSESSMENT: 0-10

## 2024-07-13 NOTE — NURSING NOTE
CAROLINE SIERRA consulted for PIV restart. Upon assessment with and without ultrasound patient has limited vein selection below the elbow. If patient continues having issues keeping stable peripheral access the need for alternative access is recommended.

## 2024-07-13 NOTE — CARE PLAN
The patient's goals for the shift include Get at least 6-8 hrs of sleep/rest during shift.    The clinical goals for the shift include patient will remain HDS and VSS throughout shift

## 2024-07-13 NOTE — PROGRESS NOTES
"Joellen Narayan is a 23 y.o. male on day 5 of admission presenting with Sickle cell crisis (Multi).    Subjective   NAEON. Seen at bedside, still having chest and back pain. Denies N/V/D/C, cough, shortness of breath. Not requiring any O2. Previously planned to wean today but will wait until tomorrow.        Objective     Physical Exam  Constitutional:       General: He is not in acute distress.     Appearance: He is not toxic-appearing.   HENT:      Head: Normocephalic and atraumatic.   Eyes:      General: Scleral icterus present.      Extraocular Movements: Extraocular movements intact.   Cardiovascular:      Rate and Rhythm: Normal rate and regular rhythm.   Pulmonary:      Effort: No respiratory distress.   Abdominal:      General: Abdomen is flat.      Tenderness: There is no abdominal tenderness.   Musculoskeletal:         General: No swelling or tenderness.   Skin:     Coloration: Skin is not jaundiced.      Findings: No bruising or erythema.   Neurological:      Mental Status: He is oriented to person, place, and time. Mental status is at baseline.         Last Recorded Vitals  Blood pressure 121/58, pulse 72, temperature 36.7 °C (98.1 °F), temperature source Temporal, resp. rate 18, height 1.88 m (6' 2\"), weight 72.6 kg (160 lb), SpO2 95%.  Intake/Output last 3 Shifts:  I/O last 3 completed shifts:  In: 368.8 (5.1 mL/kg) [I.V.:368.8 (5.1 mL/kg)]  Out: - (0 mL/kg)   Weight: 72.6 kg         Assessment/Plan   Principal Problem:    Sickle cell crisis (Multi)    Joellen Narayan is a 23 y.o. 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 7/8 Troponin negative, EKG NSR with PACs, no T wave inversion. CXR (7/8) without evidence of acute process. Lysis labs near pt's baseline, however as of 7/10 slightly uptrending. Started on IV dilaudid for pain management (7/8- current). 7/11 Repeat CXR unchanged from prior. LFTs continuing to uptrend, RUQ U/S pending 7/12. DC home pending improvement in pain.     # Hgb SS with acute on chronic pain  - OARRS reviewed, no aberrancies  - No current care path  - Most recent urgent RBC exchange Feb 2024 for Acute Chest Syndrome  - Hgb baseline ~ 7-8, Hgb 9.9 (7/8)--> Hgb 7.5 (7/10) ->6.9 (7/12); no current indication for simple transfusion at this time per Dr Cruz   - LDH baseline fluctuates but ~500,  (7/8)  - Mild leukocytosis noted on admission consistent with previous admissions. WBC 12.5k (7/8)--> improved to 8.4 (7/10); likely reactive as pt afebrile with no s/sx of infectious process   - Pt with CP and SOB, however these are all consistent with pt's baseline presentation of sickle cell crisis. In the absence of imaging findings c/f infection or ACS, no wheezing, and no fevers-- low c/f ACS at this time, however will closely monitor  - CXR (7/8) without evidence of acute process  - 7/11 overnight repeat CXR unchanged from prior   - Troponin (7/8): 12  - EKG (7/8): NSR with PACs, no T wave inversion,   - On admit started IV dilaudid 3mg q2hrs PRN severe pain (7/8- 7/10), increased to 3.5mg IVP in afternoon d/t unrelieved pain (7/10-7/11), pt declining PCA  - 7/11 Increased to 4mg dilaudid q2 PRN severe pain  - Plan to transition to rotating PO and IV 7/14  - S/p IV Toradol 30mg q6hrs x3 days (7/8-7/11) with Protonix for PPI prophy  - Supportive care: lidocaine patch, voltaren gel x4/day, hot packs   - Continue home folic acid 1mg daily  - Utox (7/8): prelim + cannabinoid  - Hgb S (7/8) 74.2%  - PO Zofran and compazine  PRN for opioid-induced nausea, PO Benadryl PRN for opioid-induced pruritus, bowel regimen for opioid-induced constipation with DocuSenna 2tabs BID and Miralax daily (LBM 7/10)     # transaminitis  # hyperbilirubinemia  - Baseline tbili ~ 6-7  - Likely reactive 2/2 crisis vs c/f developing sickle hepatopathy   - on admit , ALT 28, AST 69, tbili 7.7 --> (7/10) increased 139/47/115/bili 11.6 --> (7/12) 156/89/128/t bili 23.2   - Pt denies RUQ pain, no fever/chills noted (7/10-current)   - Direct tbili 3.3 (7/10) --> 8.8 (7/12)  - s/p D51/2NS @ 75 (7/10--7/11)  - RUQ U/S ordered 7/12, still pending 7/14     # Nodular lymphocyte predominant Hodgkins lymphoma (NLPHL)   - Primary Oncologist: Dr. Stoll  - Enlarged lymph nodes noted 4/1/22  - RUQ US (11/14/22) with mildly enlarged LNs in the region of the kavin hepatis  - MRI liver (11/16/22) showed re-demonstration of bulky retroperitoneal lymphadenopathy and kavin hepatic lymphadenopathy    - (11/18/22) lymph node biopsy showed atypical lymphoid infiltrate. Reviewed by Hemepath board, insufficient for lymphoma diagnosis  - PET/CT (12/6/22) showing retroperitoneal lymphadenopathy  - Followed up with Dr. Stoll (12/16/22) with plan for surg/onc consult for large tissue bx but patient missed apt and was lost to follow up  - Requested new apt with Dr. Ronnie Marte 6/19/23, patient was not seen and lost to follow up  - CT a/p (11/28/23) increased size of retroperitoneal lymph nodes reflecting extramedullary hematopoiesis I/s/o sickle cell vs lymphoma  - Paraaortic LN bx via IR (11/30/23) consistent with NLPHL. Flow: no clonal B cell or T cell population identified, lymphocyte 95%, CD3+CD4+ 68%, CD3+CD8+ 7%, CD19+ 24%  - Elevated LDH/bili partially from sickle cell disease   - Chemotherapy (R-CHOP) was discussed with primary oncologist Dr. Stoll, and decision was made to simplify his chemotherapy to Rituxan and prednisone q3 weeks mainly due to frequent sickle cell  crisis  - Current chemo regimen: rituxan and prednisone q3 weeks (C1 1/18/24, C2 2/8/24, Missed C3 d/t sickle cell pain crisis, C4 4/4/24, C5 5/16/24, C6 6/7/24)  - Per pt no longer taking Acyclovir 400mg BID prophy   - planned for re-staging scans 7/15. If still inpatient, will order for completion      # Hx Priapism  - Last episode 6/18; pt presented to the ED with Priapism. Urology consulted at that time and unable to aspirate blood, however rec no acute further interventions necessary as there was improvement with conservative management  - No active issues with priapism currently  - Can order Sudafed q4h PRN if reoccurs   - Pt missed urology FUV 7/2     # Hx of nocturnal oxygen dependence and hypoxia on room air  - Historically improves with oxygen supplementation  - Pt hypoxic on admission (SpO2 84% on RA while asleep), placed on 2L supp O2    - CXR (7/8) without evidence of acute process; pt additionally denies SOB  - Has not had home oxygen for 2-3 years   - Pt did not qualify for home O2 per walking pulse ox performed during previous hospital admission on 2/26/24  - Wean as able, encourage incentive spirometry  - 6/2024 failed x1 walking pulse ox, 6/25 pt was on RA however failed walking pulse ox and was sent home on 2L continuous O2   - Pulm FUV 8/5     DVT prophy: Lovenox subcutaneous, SCDs, encourage ambulation     DISPO:  - Full code, confirmed on admit  - DC home resumed O2 pending improvement in pain  - NOK: Melissa Narayan (Parent) [phone]  - FUV PET/CT 7/15, Dr. Stoll 7/25, Pulm 8/5, Sickle Cell FUV requested/pending       I spent >60 minutes in the professional and overall care of this patient.      Mary Moody, RAAD-CNP

## 2024-07-14 ENCOUNTER — APPOINTMENT (OUTPATIENT)
Dept: RADIOLOGY | Facility: HOSPITAL | Age: 24
DRG: 812 | End: 2024-07-14
Payer: COMMERCIAL

## 2024-07-14 VITALS
BODY MASS INDEX: 20.53 KG/M2 | DIASTOLIC BLOOD PRESSURE: 68 MMHG | TEMPERATURE: 100 F | OXYGEN SATURATION: 88 % | HEIGHT: 74 IN | RESPIRATION RATE: 18 BRPM | WEIGHT: 160 LBS | SYSTOLIC BLOOD PRESSURE: 132 MMHG | HEART RATE: 75 BPM

## 2024-07-14 LAB
ABO GROUP (TYPE) IN BLOOD: NORMAL
ALBUMIN SERPL BCP-MCNC: 4.6 G/DL (ref 3.4–5)
ALP SERPL-CCNC: 176 U/L (ref 33–120)
ALT SERPL W P-5'-P-CCNC: 93 U/L (ref 10–52)
ANION GAP SERPL CALC-SCNC: 12 MMOL/L (ref 10–20)
ANTIBODY SCREEN: NORMAL
APAP SERPL-MCNC: <10 UG/ML
APPEARANCE UR: CLEAR
AST SERPL W P-5'-P-CCNC: 109 U/L (ref 9–39)
BASOPHILS # BLD AUTO: 0.07 X10*3/UL (ref 0–0.1)
BASOPHILS NFR BLD AUTO: 0.6 %
BILIRUB SERPL-MCNC: 26.4 MG/DL (ref 0–1.2)
BILIRUB UR STRIP.AUTO-MCNC: ABNORMAL MG/DL
BUN SERPL-MCNC: 5 MG/DL (ref 6–23)
BURR CELLS BLD QL SMEAR: NORMAL
CALCIUM SERPL-MCNC: 9.9 MG/DL (ref 8.6–10.6)
CHLORIDE SERPL-SCNC: 99 MMOL/L (ref 98–107)
CO2 SERPL-SCNC: 31 MMOL/L (ref 21–32)
COLOR UR: ABNORMAL
CREAT SERPL-MCNC: 0.67 MG/DL (ref 0.5–1.3)
EGFRCR SERPLBLD CKD-EPI 2021: >90 ML/MIN/1.73M*2
EOSINOPHIL # BLD AUTO: 0.62 X10*3/UL (ref 0–0.7)
EOSINOPHIL NFR BLD AUTO: 5.6 %
ERYTHROCYTE [DISTWIDTH] IN BLOOD BY AUTOMATED COUNT: 21.9 % (ref 11.5–14.5)
GGT SERPL-CCNC: 159 U/L (ref 5–64)
GLUCOSE SERPL-MCNC: 80 MG/DL (ref 74–99)
GLUCOSE UR STRIP.AUTO-MCNC: NORMAL MG/DL
HBV CORE AB SER QL: NONREACTIVE
HBV SURFACE AB SER-ACNC: <3.1 MIU/ML
HCT VFR BLD AUTO: 19.4 % (ref 41–52)
HCV AB SER QL: NONREACTIVE
HGB BLD-MCNC: 7.1 G/DL (ref 13.5–17.5)
HGB RETIC QN: 31 PG (ref 28–38)
HOWELL-JOLLY BOD BLD QL SMEAR: PRESENT
IMM GRANULOCYTES # BLD AUTO: 0.31 X10*3/UL (ref 0–0.7)
IMM GRANULOCYTES NFR BLD AUTO: 2.8 % (ref 0–0.9)
IMMATURE RETIC FRACTION: 30.1 %
KETONES UR STRIP.AUTO-MCNC: NEGATIVE MG/DL
LDH SERPL L TO P-CCNC: 644 U/L (ref 84–246)
LEUKOCYTE ESTERASE UR QL STRIP.AUTO: NEGATIVE
LYMPHOCYTES # BLD AUTO: 1.39 X10*3/UL (ref 1.2–4.8)
LYMPHOCYTES NFR BLD AUTO: 12.6 %
MCH RBC QN AUTO: 30.9 PG (ref 26–34)
MCHC RBC AUTO-ENTMCNC: 36.6 G/DL (ref 32–36)
MCV RBC AUTO: 84 FL (ref 80–100)
MONOCYTES # BLD AUTO: 1.86 X10*3/UL (ref 0.1–1)
MONOCYTES NFR BLD AUTO: 16.9 %
NEUTROPHILS # BLD AUTO: 6.74 X10*3/UL (ref 1.2–7.7)
NEUTROPHILS NFR BLD AUTO: 61.5 %
NITRITE UR QL STRIP.AUTO: NEGATIVE
NRBC BLD-RTO: 7.8 /100 WBCS (ref 0–0)
PAPPENHEIMER BOD BLD QL SMEAR: PRESENT
PH UR STRIP.AUTO: 6.5 [PH]
PLATELET # BLD AUTO: 289 X10*3/UL (ref 150–450)
POLYCHROMASIA BLD QL SMEAR: NORMAL
POTASSIUM SERPL-SCNC: 4 MMOL/L (ref 3.5–5.3)
PROT SERPL-MCNC: 6.7 G/DL (ref 6.4–8.2)
PROT UR STRIP.AUTO-MCNC: NEGATIVE MG/DL
RBC # BLD AUTO: 2.3 X10*6/UL (ref 4.5–5.9)
RBC # UR STRIP.AUTO: NEGATIVE /UL
RBC MORPH BLD: NORMAL
RETICS #: 0.57 X10*6/UL (ref 0.02–0.12)
RETICS/RBC NFR AUTO: 24.7 % (ref 0.5–2)
RH FACTOR (ANTIGEN D): NORMAL
SCHISTOCYTES BLD QL SMEAR: NORMAL
SICKLE CELLS BLD QL SMEAR: NORMAL
SODIUM SERPL-SCNC: 138 MMOL/L (ref 136–145)
SP GR UR STRIP.AUTO: 1.01
UROBILINOGEN UR STRIP.AUTO-MCNC: ABNORMAL MG/DL
WBC # BLD AUTO: 11 X10*3/UL (ref 4.4–11.3)

## 2024-07-14 PROCEDURE — 83615 LACTATE (LD) (LDH) ENZYME: CPT | Performed by: NURSE PRACTITIONER

## 2024-07-14 PROCEDURE — 2500000004 HC RX 250 GENERAL PHARMACY W/ HCPCS (ALT 636 FOR OP/ED)

## 2024-07-14 PROCEDURE — 2500000005 HC RX 250 GENERAL PHARMACY W/O HCPCS: Performed by: NURSE PRACTITIONER

## 2024-07-14 PROCEDURE — 86709 HEPATITIS A IGM ANTIBODY: CPT

## 2024-07-14 PROCEDURE — 93975 VASCULAR STUDY: CPT | Performed by: STUDENT IN AN ORGANIZED HEALTH CARE EDUCATION/TRAINING PROGRAM

## 2024-07-14 PROCEDURE — 99233 SBSQ HOSP IP/OBS HIGH 50: CPT

## 2024-07-14 PROCEDURE — 2500000001 HC RX 250 WO HCPCS SELF ADMINISTERED DRUGS (ALT 637 FOR MEDICARE OP): Performed by: NURSE PRACTITIONER

## 2024-07-14 PROCEDURE — 86901 BLOOD TYPING SEROLOGIC RH(D): CPT | Performed by: NURSE PRACTITIONER

## 2024-07-14 PROCEDURE — 82977 ASSAY OF GGT: CPT

## 2024-07-14 PROCEDURE — 36415 COLL VENOUS BLD VENIPUNCTURE: CPT

## 2024-07-14 PROCEDURE — 84075 ASSAY ALKALINE PHOSPHATASE: CPT | Performed by: NURSE PRACTITIONER

## 2024-07-14 PROCEDURE — 76705 ECHO EXAM OF ABDOMEN: CPT | Performed by: STUDENT IN AN ORGANIZED HEALTH CARE EDUCATION/TRAINING PROGRAM

## 2024-07-14 PROCEDURE — 93975 VASCULAR STUDY: CPT

## 2024-07-14 PROCEDURE — 85045 AUTOMATED RETICULOCYTE COUNT: CPT | Performed by: NURSE PRACTITIONER

## 2024-07-14 PROCEDURE — 81003 URINALYSIS AUTO W/O SCOPE: CPT

## 2024-07-14 PROCEDURE — 2500000004 HC RX 250 GENERAL PHARMACY W/ HCPCS (ALT 636 FOR OP/ED): Performed by: NURSE PRACTITIONER

## 2024-07-14 PROCEDURE — 85025 COMPLETE CBC W/AUTO DIFF WBC: CPT | Performed by: NURSE PRACTITIONER

## 2024-07-14 PROCEDURE — 1170000001 HC PRIVATE ONCOLOGY ROOM DAILY

## 2024-07-14 PROCEDURE — 36415 COLL VENOUS BLD VENIPUNCTURE: CPT | Performed by: NURSE PRACTITIONER

## 2024-07-14 ASSESSMENT — PAIN SCALES - GENERAL
PAINLEVEL_OUTOF10: 8
PAINLEVEL_OUTOF10: 2
PAINLEVEL_OUTOF10: 7
PAINLEVEL_OUTOF10: 2
PAINLEVEL_OUTOF10: 7
PAINLEVEL_OUTOF10: 9
PAINLEVEL_OUTOF10: 6
PAINLEVEL_OUTOF10: 6
PAINLEVEL_OUTOF10: 9
PAINLEVEL_OUTOF10: 8

## 2024-07-14 ASSESSMENT — PAIN - FUNCTIONAL ASSESSMENT
PAIN_FUNCTIONAL_ASSESSMENT: 0-10

## 2024-07-14 ASSESSMENT — COGNITIVE AND FUNCTIONAL STATUS - GENERAL
DAILY ACTIVITIY SCORE: 24
DAILY ACTIVITIY SCORE: 24
MOBILITY SCORE: 24
MOBILITY SCORE: 24

## 2024-07-14 NOTE — CARE PLAN
Problem: Fall/Injury  Goal: Not fall by end of shift  Outcome: Progressing  Goal: Be free from injury by end of the shift  Outcome: Progressing  Goal: Verbalize understanding of personal risk factors for fall in the hospital  Outcome: Progressing  Goal: Verbalize understanding of risk factor reduction measures to prevent injury from fall in the home  Outcome: Progressing  Goal: Use assistive devices by end of the shift  Outcome: Progressing  Goal: Pace activities to prevent fatigue by end of the shift  Outcome: Progressing     Problem: Pain - Adult  Goal: Verbalizes/displays adequate comfort level or baseline comfort level  Outcome: Progressing   The patient's goals for the shift include Get at least 6-8 hrs of sleep/rest during shift.    The clinical goals for the shift include pt will have tolerable pain management through 7/14 at 1900    Pt remained safe. VSS. Continues to get dilaudid for pain

## 2024-07-15 LAB
ALBUMIN SERPL BCP-MCNC: 4.4 G/DL (ref 3.4–5)
ALP SERPL-CCNC: 181 U/L (ref 33–120)
ALT SERPL W P-5'-P-CCNC: 82 U/L (ref 10–52)
ANION GAP SERPL CALC-SCNC: 16 MMOL/L (ref 10–20)
AST SERPL W P-5'-P-CCNC: 100 U/L (ref 9–39)
ATRIAL RATE: 73 BPM
BASOPHILS # BLD AUTO: 0.09 X10*3/UL (ref 0–0.1)
BASOPHILS NFR BLD AUTO: 0.7 %
BILIRUB SERPL-MCNC: 24.5 MG/DL (ref 0–1.2)
BUN SERPL-MCNC: 6 MG/DL (ref 6–23)
CALCIUM SERPL-MCNC: 9.6 MG/DL (ref 8.6–10.6)
CHLORIDE SERPL-SCNC: 98 MMOL/L (ref 98–107)
CO2 SERPL-SCNC: 26 MMOL/L (ref 21–32)
CREAT SERPL-MCNC: 0.68 MG/DL (ref 0.5–1.3)
EGFRCR SERPLBLD CKD-EPI 2021: >90 ML/MIN/1.73M*2
EOSINOPHIL # BLD AUTO: 0.7 X10*3/UL (ref 0–0.7)
EOSINOPHIL NFR BLD AUTO: 5.2 %
ERYTHROCYTE [DISTWIDTH] IN BLOOD BY AUTOMATED COUNT: 24.2 % (ref 11.5–14.5)
GLUCOSE SERPL-MCNC: 65 MG/DL (ref 74–99)
HAV IGM SER QL: NONREACTIVE
HCT VFR BLD AUTO: 20.9 % (ref 41–52)
HGB BLD-MCNC: 7.5 G/DL (ref 13.5–17.5)
HGB RETIC QN: 30 PG (ref 28–38)
HOWELL-JOLLY BOD BLD QL SMEAR: PRESENT
IMM GRANULOCYTES # BLD AUTO: 0.45 X10*3/UL (ref 0–0.7)
IMM GRANULOCYTES NFR BLD AUTO: 3.4 % (ref 0–0.9)
IMMATURE RETIC FRACTION: 38.4 %
LDH SERPL L TO P-CCNC: 607 U/L (ref 84–246)
LYMPHOCYTES # BLD AUTO: 2.49 X10*3/UL (ref 1.2–4.8)
LYMPHOCYTES NFR BLD AUTO: 18.6 %
MCH RBC QN AUTO: 31.3 PG (ref 26–34)
MCHC RBC AUTO-ENTMCNC: 35.9 G/DL (ref 32–36)
MCV RBC AUTO: 87 FL (ref 80–100)
MONOCYTES # BLD AUTO: 2.15 X10*3/UL (ref 0.1–1)
MONOCYTES NFR BLD AUTO: 16.1 %
NEUTROPHILS # BLD AUTO: 7.48 X10*3/UL (ref 1.2–7.7)
NEUTROPHILS NFR BLD AUTO: 56 %
NRBC BLD-RTO: 11.9 /100 WBCS (ref 0–0)
OVALOCYTES BLD QL SMEAR: NORMAL
P AXIS: -89 DEGREES
P OFFSET: 191 MS
P ONSET: 126 MS
PAPPENHEIMER BOD BLD QL SMEAR: PRESENT
PLATELET # BLD AUTO: 270 X10*3/UL (ref 150–450)
POLYCHROMASIA BLD QL SMEAR: NORMAL
POTASSIUM SERPL-SCNC: 3.7 MMOL/L (ref 3.5–5.3)
PR INTERVAL: 184 MS
PROT SERPL-MCNC: 6.5 G/DL (ref 6.4–8.2)
Q ONSET: 218 MS
QRS COUNT: 12 BEATS
QRS DURATION: 98 MS
QT INTERVAL: 372 MS
QTC CALCULATION(BAZETT): 409 MS
QTC FREDERICIA: 397 MS
R AXIS: 76 DEGREES
RBC # BLD AUTO: 2.4 X10*6/UL (ref 4.5–5.9)
RBC MORPH BLD: NORMAL
RETICS #: 0.68 X10*6/UL (ref 0.02–0.12)
RETICS/RBC NFR AUTO: 28.3 % (ref 0.5–2)
SICKLE CELLS BLD QL SMEAR: NORMAL
SODIUM SERPL-SCNC: 136 MMOL/L (ref 136–145)
T AXIS: -27 DEGREES
T OFFSET: 404 MS
TARGETS BLD QL SMEAR: NORMAL
VENTRICULAR RATE: 73 BPM
WBC # BLD AUTO: 13.4 X10*3/UL (ref 4.4–11.3)

## 2024-07-15 PROCEDURE — 36415 COLL VENOUS BLD VENIPUNCTURE: CPT

## 2024-07-15 PROCEDURE — 1170000001 HC PRIVATE ONCOLOGY ROOM DAILY

## 2024-07-15 PROCEDURE — 80053 COMPREHEN METABOLIC PANEL: CPT | Performed by: NURSE PRACTITIONER

## 2024-07-15 PROCEDURE — 99233 SBSQ HOSP IP/OBS HIGH 50: CPT

## 2024-07-15 PROCEDURE — 85025 COMPLETE CBC W/AUTO DIFF WBC: CPT | Performed by: NURSE PRACTITIONER

## 2024-07-15 PROCEDURE — 2500000001 HC RX 250 WO HCPCS SELF ADMINISTERED DRUGS (ALT 637 FOR MEDICARE OP): Performed by: NURSE PRACTITIONER

## 2024-07-15 PROCEDURE — 85045 AUTOMATED RETICULOCYTE COUNT: CPT | Performed by: NURSE PRACTITIONER

## 2024-07-15 PROCEDURE — 83615 LACTATE (LD) (LDH) ENZYME: CPT | Performed by: NURSE PRACTITIONER

## 2024-07-15 PROCEDURE — 2500000004 HC RX 250 GENERAL PHARMACY W/ HCPCS (ALT 636 FOR OP/ED)

## 2024-07-15 PROCEDURE — 36415 COLL VENOUS BLD VENIPUNCTURE: CPT | Performed by: NURSE PRACTITIONER

## 2024-07-15 RX ORDER — ACETAMINOPHEN 325 MG/1
975 TABLET ORAL ONCE
Status: COMPLETED | OUTPATIENT
Start: 2024-07-15 | End: 2024-07-15

## 2024-07-15 ASSESSMENT — COGNITIVE AND FUNCTIONAL STATUS - GENERAL
DAILY ACTIVITIY SCORE: 24
MOBILITY SCORE: 24
DAILY ACTIVITIY SCORE: 24
MOBILITY SCORE: 24

## 2024-07-15 ASSESSMENT — PAIN SCALES - GENERAL
PAINLEVEL_OUTOF10: 9
PAINLEVEL_OUTOF10: 8

## 2024-07-15 NOTE — PROGRESS NOTES
"Joellen Narayan is a 23 y.o. male on day 7 of admission presenting with Sickle cell crisis (Multi).    Subjective   Seen at bedside, waiting for US results. Reports feeling like he can wean today. NAEON.        Objective     Physical Exam  Constitutional:       General: He is not in acute distress.     Appearance: He is not toxic-appearing.   Eyes:      General: Scleral icterus present.      Extraocular Movements: Extraocular movements intact.   Cardiovascular:      Rate and Rhythm: Normal rate and regular rhythm.   Pulmonary:      Effort: No respiratory distress.   Abdominal:      General: Abdomen is flat.      Palpations: Abdomen is soft.      Tenderness: There is no abdominal tenderness.   Musculoskeletal:         General: No swelling or tenderness.   Skin:     Coloration: Skin is not jaundiced.      Findings: No bruising or erythema.   Neurological:      Mental Status: He is oriented to person, place, and time. Mental status is at baseline.         Last Recorded Vitals  Blood pressure 130/70, pulse 69, temperature 37.4 °C (99.3 °F), temperature source Temporal, resp. rate 16, height 1.88 m (6' 2\"), weight 72.6 kg (160 lb), SpO2 95%.  Intake/Output last 3 Shifts:  No intake/output data recorded.      Assessment/Plan   Principal Problem:    Sickle cell crisis (Multi)    Joellen Narayan is a 23 y.o. 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 7/8 Troponin negative, EKG NSR with PACs, no T wave inversion. CXR (7/8) without evidence of acute process. Lysis labs " near pt's baseline, however as of 7/10 slightly uptrending. Started on IV dilaudid for pain management (7/8- current). 7/11 Repeat CXR unchanged from prior. LFTs continuing to uptrend, RUQ U/S pending 7/12. DC home pending improvement in pain.     # Hgb SS with acute on chronic pain  - OARRS reviewed, no aberrancies  - No current care path  - Most recent urgent RBC exchange Feb 2024 for Acute Chest Syndrome  - Hgb baseline ~ 7-8, Hgb 9.9 (7/8)--> Hgb 7.5 (7/10) ->6.9 (7/12); no current indication for simple transfusion at this time per Dr Cruz   - LDH baseline fluctuates but ~500,  (7/8)  - Mild leukocytosis noted on admission consistent with previous admissions. WBC 12.5k (7/8)--> improved to 8.4 (7/10); likely reactive as pt afebrile with no s/sx of infectious process   - Pt with CP and SOB, however these are all consistent with pt's baseline presentation of sickle cell crisis. In the absence of imaging findings c/f infection or ACS, no wheezing, and no fevers-- low c/f ACS at this time, however will closely monitor  - CXR (7/8) without evidence of acute process  - 7/11 overnight repeat CXR unchanged from prior   - Troponin (7/8): 12  - EKG (7/8): NSR with PACs, no T wave inversion,   - On admit started IV dilaudid 3mg q2hrs PRN severe pain (7/8- 7/10), increased to 3.5mg IVP in afternoon d/t unrelieved pain (7/10-7/11), pt declining PCA  - 7/11 Increased to 4mg dilaudid q2 PRN severe pain  - Plan to transition to rotating PO and IV 7/14  - S/p IV Toradol 30mg q6hrs x3 days (7/8-7/11) with Protonix for PPI prophy  - Supportive care: lidocaine patch, voltaren gel x4/day, hot packs   - Continue home folic acid 1mg daily  - Utox (7/8): prelim + cannabinoid  - Hgb S (7/8) 74.2%  - PO Zofran and compazine PRN for opioid-induced nausea, PO Benadryl PRN for opioid-induced pruritus, bowel regimen for opioid-induced constipation with DocuSenna 2tabs BID and Miralax daily (LBM 7/10)     # transaminitis  #  hyperbilirubinemia  - Baseline tbili ~ 6-7  - Likely reactive 2/2 crisis vs c/f developing sickle hepatopathy   - on admit , ALT 28, AST 69, tbili 7.7 --> (7/10) increased 139/47/115/bili 11.6 --> (7/12) 156/89/128/t bili 23.2   - Pt denies RUQ pain, no fever/chills noted (7/10-current)   - Direct tbili 3.3 (7/10) --> 8.8 (7/12)  - s/p D51/2NS @ 75 (7/10--7/11)  - RUQ U/S ordered 7/12, still pending 7/14     # Nodular lymphocyte predominant Hodgkins lymphoma (NLPHL)   - Primary Oncologist: Dr. Stoll  - Enlarged lymph nodes noted 4/1/22  - RUQ US (11/14/22) with mildly enlarged LNs in the region of the kavin hepatis  - MRI liver (11/16/22) showed re-demonstration of bulky retroperitoneal lymphadenopathy and kavin hepatic lymphadenopathy    - (11/18/22) lymph node biopsy showed atypical lymphoid infiltrate. Reviewed by Hemepath board, insufficient for lymphoma diagnosis  - PET/CT (12/6/22) showing retroperitoneal lymphadenopathy  - Followed up with Dr. Stoll (12/16/22) with plan for surg/onc consult for large tissue bx but patient missed apt and was lost to follow up  - Requested new apt with Dr. Ronnie Marte 6/19/23, patient was not seen and lost to follow up  - CT a/p (11/28/23) increased size of retroperitoneal lymph nodes reflecting extramedullary hematopoiesis I/s/o sickle cell vs lymphoma  - Paraaortic LN bx via IR (11/30/23) consistent with NLPHL. Flow: no clonal B cell or T cell population identified, lymphocyte 95%, CD3+CD4+ 68%, CD3+CD8+ 7%, CD19+ 24%  - Elevated LDH/bili partially from sickle cell disease   - Chemotherapy (R-CHOP) was discussed with primary oncologist Dr. Stoll, and decision was made to simplify his chemotherapy to Rituxan and prednisone q3 weeks mainly due to frequent sickle cell crisis  - Current chemo regimen: rituxan and prednisone q3 weeks (C1 1/18/24, C2 2/8/24, Missed C3 d/t sickle cell pain crisis, C4 4/4/24, C5 5/16/24, C6 6/7/24)  - Per pt no longer taking Acyclovir  400mg BID prophy   - planned for re-staging scans 7/15. Will reschedule as outpatient (Dr Stoll notified)     # Hx Priapism  - Last episode 6/18; pt presented to the ED with Priapism. Urology consulted at that time and unable to aspirate blood, however rec no acute further interventions necessary as there was improvement with conservative management  - No active issues with priapism currently  - Can order Sudafed q4h PRN if reoccurs   - Pt missed urology FUV 7/2     # Hx of nocturnal oxygen dependence and hypoxia on room air  - Historically improves with oxygen supplementation  - Pt hypoxic on admission (SpO2 84% on RA while asleep), placed on 2L supp O2    - CXR (7/8) without evidence of acute process; pt additionally denies SOB  - Has not had home oxygen for 2-3 years   - Pt did not qualify for home O2 per walking pulse ox performed during previous hospital admission on 2/26/24  - Wean as able, encourage incentive spirometry  - 6/2024 failed x1 walking pulse ox, 6/25 pt was on RA however failed walking pulse ox and was sent home on 2L continuous O2   - Pulm FUV 8/5     DVT prophy: Lovenox subcutaneous, SCDs, encourage ambulation     DISPO:  - Full code, confirmed on admit  - DC home resumed O2 pending improvement in pain  - NOK: Melissa Narayan (Parent) [phone]  - FUV PET/CT 7/15, Dr. Stoll 7/25, Pulm 8/5, Sickle Cell FUV requested/pending       I spent >60 minutes in the professional and overall care of this patient.      Mary Moody, APRN-CNP

## 2024-07-15 NOTE — CARE PLAN
The patient's goals for the shift include Get at least 6-8 hrs of sleep/rest during shift.    The clinical goals for the shift include Pt will have decreased pain through end of shift 7/15/2024 1900      Problem: Fall/Injury  Goal: Not fall by end of shift  Outcome: Progressing  Goal: Be free from injury by end of the shift  Outcome: Progressing  Goal: Verbalize understanding of personal risk factors for fall in the hospital  Outcome: Progressing  Goal: Verbalize understanding of risk factor reduction measures to prevent injury from fall in the home  Outcome: Progressing  Goal: Use assistive devices by end of the shift  Outcome: Progressing  Goal: Pace activities to prevent fatigue by end of the shift  Outcome: Progressing     Problem: Pain  Goal: Takes deep breaths with improved pain control throughout the shift  Outcome: Progressing  Goal: Turns in bed with improved pain control throughout the shift  Outcome: Progressing  Goal: Walks with improved pain control throughout the shift  Outcome: Progressing  Goal: Performs ADL's with improved pain control throughout shift  Outcome: Progressing  Goal: Participates in PT with improved pain control throughout the shift  Outcome: Progressing  Goal: Free from opioid side effects throughout the shift  Outcome: Progressing  Goal: Free from acute confusion related to pain meds throughout the shift  Outcome: Progressing     Problem: Pain - Adult  Goal: Verbalizes/displays adequate comfort level or baseline comfort level  Outcome: Progressing     Problem: Safety - Adult  Goal: Free from fall injury  Outcome: Progressing     Problem: Discharge Planning  Goal: Discharge to home or other facility with appropriate resources  Outcome: Progressing     Problem: Chronic Conditions and Co-morbidities  Goal: Patient's chronic conditions and co-morbidity symptoms are monitored and maintained or improved  Outcome: Progressing

## 2024-07-15 NOTE — PROGRESS NOTES
"Joellen Narayan is a 23 y.o. male on day 7 of admission presenting with Sickle cell crisis (Multi).    Subjective   Seen at bedside and given handout on jaundice, he is very worried about his bilirubin and wants to know how long it will take to go down. We discussed the physiology behind this. Otherwise no concerns. Having some increased pain prior to his q3h dosing, but doesn't think he needs q2h again. Denies N/V/D/C, chest pain, cough, shortness of breath.         Objective     Physical Exam  Constitutional:       General: He is not in acute distress.     Appearance: He is not toxic-appearing.   HENT:      Head: Normocephalic and atraumatic.   Eyes:      General: Scleral icterus present.      Extraocular Movements: Extraocular movements intact.   Cardiovascular:      Rate and Rhythm: Normal rate and regular rhythm.   Pulmonary:      Effort: No respiratory distress.   Abdominal:      General: Abdomen is flat.      Palpations: Abdomen is soft.      Tenderness: There is no abdominal tenderness.   Musculoskeletal:         General: No swelling or tenderness.   Skin:     Coloration: Skin is not jaundiced.      Findings: No bruising or erythema.   Neurological:      Mental Status: He is oriented to person, place, and time. Mental status is at baseline.         Last Recorded Vitals  Blood pressure 130/70, pulse 69, temperature 37.4 °C (99.3 °F), temperature source Temporal, resp. rate 16, height 1.88 m (6' 2\"), weight 72.6 kg (160 lb), SpO2 95%.  Intake/Output last 3 Shifts:  No intake/output data recorded.       Assessment/Plan   Principal Problem:    Sickle cell crisis (Multi)    Joellen Narayan is a 23 y.o. 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 7/8 Troponin negative, EKG NSR with PACs, no T wave inversion. CXR (7/8) without evidence of acute process. Lysis labs near pt's baseline, however as of 7/10 slightly uptrending. Started on IV dilaudid for pain management (7/8- current). 7/11 Repeat CXR unchanged from prior. LFTs continuing to uptrend, RUQ U/S 7/14 showing cholelithiasis without cholecystitis, increased hepatic parenchymal echogenicity 2/2 mild diffuse fatty infiltration versus hepatocellular disease. Given acute increase followed by slow decline, likely r/t transient choledocholithiasis. DC home pending improvement in pain.     # Hgb SS with acute on chronic pain  - OARRS reviewed, no aberrancies  - No current care path  - Most recent urgent RBC exchange Feb 2024 for Acute Chest Syndrome  - Hgb baseline ~ 7-8, Hgb 9.9 (7/8)--> Hgb 7.5 (7/10) ->6.9 (7/12); no current indication for simple transfusion at this time per Dr Cruz   - LDH baseline fluctuates but ~500,  (7/8)  - Mild leukocytosis noted on admission consistent with previous admissions. WBC 12.5k (7/8)--> improved to 8.4 (7/10); likely reactive as pt afebrile with no s/sx of infectious process   - Pt with CP and SOB, however these are all consistent with pt's baseline presentation of sickle cell crisis. In the absence of imaging findings c/f infection or ACS, no wheezing, and no fevers-- low c/f ACS at this time, however will closely monitor  - CXR (7/8) without evidence of acute process  - 7/11 overnight repeat CXR unchanged from prior   - Troponin (7/8): 12  - EKG (7/8): NSR with PACs, no T wave inversion,   - On admit started IV dilaudid 3mg q2hrs PRN severe pain (7/8- 7/10), increased to 3.5mg IVP in afternoon d/t unrelieved pain (7/10-7/11), pt declining PCA  - 7/11 Increased to  4mg dilaudid q2 PRN severe pain and then weaned to q3h on 7/14  - S/p IV Toradol 30mg q6hrs x3 days (7/8-7/11) with Protonix for PPI prophy  - Supportive care: lidocaine patch, voltaren gel x4/day, hot packs   - Continue home folic acid 1mg daily  - Utox (7/8): prelim + cannabinoid  - Hgb S (7/8) 74.2%  - PO Zofran and compazine PRN for opioid-induced nausea, PO Benadryl PRN for opioid-induced pruritus, bowel regimen for opioid-induced constipation with DocuSenna 2tabs BID and Miralax daily (LBM 7/10)     # transaminitis, improving  # hyperbilirubinemia, improving  - Baseline tbili ~ 6-7  - Likely reactive 2/2 crisis vs c/f developing sickle hepatopathy vs choledocholithiasis   - on admit , ALT 28, AST 69, tbili 7.7 --> (7/10) increased 139/47/115/bili 11.6 --> (7/12) 156/89/128/t bili 23.2   - Pt denies RUQ pain, no fever/chills noted (7/10-current)   - Direct tbili 3.3 (7/10) --> 8.8 (7/12)  - GGT (7/14) 159  - s/p D51/2NS @ 75 (7/10--7/11)  - RUQ U/S 7/14 showing cholelithiasis without cholecystitis, increased hepatic parenchymal echogenicity 2/2 mild diffuse fatty infiltration versus hepatocellular disease  - given acute increase followed by slow decline, likely r/t transient choledocholithiasis  - pt to follow up with Dr Stoll and Dr Cruz outpatient for continued monitoring once discharged      # Nodular lymphocyte predominant Hodgkins lymphoma (NLPHL)   - Primary Oncologist: Dr. Stoll  - Enlarged lymph nodes noted 4/1/22  - RUQ US (11/14/22) with mildly enlarged LNs in the region of the kavin hepatis  - MRI liver (11/16/22) showed re-demonstration of bulky retroperitoneal lymphadenopathy and kavin hepatic lymphadenopathy    - (11/18/22) lymph node biopsy showed atypical lymphoid infiltrate. Reviewed by Hemepath board, insufficient for lymphoma diagnosis  - PET/CT (12/6/22) showing retroperitoneal lymphadenopathy  - Followed up with Dr. Stoll (12/16/22) with plan for surg/onc consult for large  tissue bx but patient missed apt and was lost to follow up  - Requested new apt with Dr. Ronnie Marte 6/19/23, patient was not seen and lost to follow up  - CT a/p (11/28/23) increased size of retroperitoneal lymph nodes reflecting extramedullary hematopoiesis I/s/o sickle cell vs lymphoma  - Paraaortic LN bx via IR (11/30/23) consistent with NLPHL. Flow: no clonal B cell or T cell population identified, lymphocyte 95%, CD3+CD4+ 68%, CD3+CD8+ 7%, CD19+ 24%  - Elevated LDH/bili partially from sickle cell disease   - Chemotherapy (R-CHOP) was discussed with primary oncologist Dr. Stoll, and decision was made to simplify his chemotherapy to Rituxan and prednisone q3 weeks mainly due to frequent sickle cell crisis  - Current chemo regimen: rituxan and prednisone q3 weeks (C1 1/18/24, C2 2/8/24, Missed C3 d/t sickle cell pain crisis, C4 4/4/24, C5 5/16/24, C6 6/7/24)  - Per pt no longer taking Acyclovir 400mg BID prophy   - planned for re-staging PET CT 7/15. Will reschedule as outpatient (Dr Stlol notified)     # Hx Priapism  - Last episode 6/18; pt presented to the ED with Priapism. Urology consulted at that time and unable to aspirate blood, however rec no acute further interventions necessary as there was improvement with conservative management  - No active issues with priapism currently  - Can order Sudafed q4h PRN if reoccurs   - Pt missed urology FUV 7/2     # Hx of nocturnal oxygen dependence and hypoxia on room air  - Historically improves with oxygen supplementation  - Pt hypoxic on admission (SpO2 84% on RA while asleep), placed on 2L supp O2    - CXR (7/8) without evidence of acute process; pt additionally denies SOB  - Has not had home oxygen for 2-3 years   - Pt did not qualify for home O2 per walking pulse ox performed during previous hospital admission on 2/26/24  - Wean as able, encourage incentive spirometry  - 6/2024 failed x1 walking pulse ox, 6/25 pt was on RA however failed walking pulse ox and  was sent home on 2L continuous O2   - Pulm FUV 8/5     DVT prophy: Lovenox subcutaneous, SCDs, encourage ambulation     DISPO:  - Full code, confirmed on admit  - DC home resumed O2 pending improvement in pain  - NOK: Melissa Narayan (Parent) [phone]  - FUV PET/CT 7/15, Dr. Stoll 7/25, Pulm 8/5, Sickle Cell FUV requested/pending       I spent >60 minutes in the professional and overall care of this patient.      Mary Moody, APRN-CNP

## 2024-07-16 LAB
ALBUMIN SERPL BCP-MCNC: 4.2 G/DL (ref 3.4–5)
ALP SERPL-CCNC: 185 U/L (ref 33–120)
ALT SERPL W P-5'-P-CCNC: 92 U/L (ref 10–52)
ANION GAP SERPL CALC-SCNC: 13 MMOL/L (ref 10–20)
APTT PPP: 28 SECONDS (ref 27–38)
AST SERPL W P-5'-P-CCNC: 115 U/L (ref 9–39)
BASOPHILS # BLD MANUAL: 0 X10*3/UL (ref 0–0.1)
BASOPHILS NFR BLD MANUAL: 0 %
BILIRUB DIRECT SERPL-MCNC: 18.4 MG/DL (ref 0–0.3)
BILIRUB SERPL-MCNC: 37.8 MG/DL (ref 0–1.2)
BUN SERPL-MCNC: 6 MG/DL (ref 6–23)
BURR CELLS BLD QL SMEAR: NORMAL
CA-I BLD-SCNC: 1.11 MMOL/L (ref 1.1–1.33)
CALCIUM SERPL-MCNC: 9.5 MG/DL (ref 8.6–10.6)
CHLORIDE SERPL-SCNC: 101 MMOL/L (ref 98–107)
CMV DNA SERPL NAA+PROBE-LOG IU: NORMAL {LOG_IU}/ML
CO2 SERPL-SCNC: 27 MMOL/L (ref 21–32)
CREAT SERPL-MCNC: 0.74 MG/DL (ref 0.5–1.3)
EGFRCR SERPLBLD CKD-EPI 2021: >90 ML/MIN/1.73M*2
EOSINOPHIL # BLD MANUAL: 0.35 X10*3/UL (ref 0–0.7)
EOSINOPHIL NFR BLD MANUAL: 3.4 %
ERYTHROCYTE [DISTWIDTH] IN BLOOD BY AUTOMATED COUNT: 24.4 % (ref 11.5–14.5)
GLUCOSE SERPL-MCNC: 81 MG/DL (ref 74–99)
HCT VFR BLD AUTO: 19 % (ref 41–52)
HGB BLD-MCNC: 6.8 G/DL (ref 13.5–17.5)
HGB RETIC QN: 32 PG (ref 28–38)
HOWELL-JOLLY BOD BLD QL SMEAR: PRESENT
IMM GRANULOCYTES # BLD AUTO: 0.25 X10*3/UL (ref 0–0.7)
IMM GRANULOCYTES NFR BLD AUTO: 2.5 % (ref 0–0.9)
IMMATURE RETIC FRACTION: 37.1 %
INR PPP: 1.2 (ref 0.9–1.1)
LABORATORY COMMENT REPORT: NOT DETECTED
LDH SERPL L TO P-CCNC: 594 U/L (ref 84–246)
LYMPHOCYTES # BLD MANUAL: 2.11 X10*3/UL (ref 1.2–4.8)
LYMPHOCYTES NFR BLD MANUAL: 20.7 %
MCH RBC QN AUTO: 31.1 PG (ref 26–34)
MCHC RBC AUTO-ENTMCNC: 35.8 G/DL (ref 32–36)
MCV RBC AUTO: 87 FL (ref 80–100)
MONOCYTES # BLD MANUAL: 0.97 X10*3/UL (ref 0.1–1)
MONOCYTES NFR BLD MANUAL: 9.5 %
MYELOCYTES # BLD MANUAL: 0.17 X10*3/UL
MYELOCYTES NFR BLD MANUAL: 1.7 %
NEUTROPHILS # BLD MANUAL: 6.6 X10*3/UL (ref 1.2–7.7)
NEUTS BAND # BLD MANUAL: 0.09 X10*3/UL (ref 0–0.7)
NEUTS BAND NFR BLD MANUAL: 0.9 %
NEUTS SEG # BLD MANUAL: 6.51 X10*3/UL (ref 1.2–7)
NEUTS SEG NFR BLD MANUAL: 63.8 %
NRBC BLD-RTO: 14.2 /100 WBCS (ref 0–0)
OVALOCYTES BLD QL SMEAR: NORMAL
PAPPENHEIMER BOD BLD QL SMEAR: PRESENT
PLATELET # BLD AUTO: 202 X10*3/UL (ref 150–450)
POLYCHROMASIA BLD QL SMEAR: NORMAL
POTASSIUM SERPL-SCNC: 4 MMOL/L (ref 3.5–5.3)
PROT SERPL-MCNC: 6 G/DL (ref 6.4–8.2)
PROTHROMBIN TIME: 13.6 SECONDS (ref 9.8–12.8)
RBC # BLD AUTO: 2.19 X10*6/UL (ref 4.5–5.9)
RBC MORPH BLD: NORMAL
RETICS #: 0.65 X10*6/UL (ref 0.02–0.12)
RETICS/RBC NFR AUTO: 29.8 % (ref 0.5–2)
SCHISTOCYTES BLD QL SMEAR: NORMAL
SICKLE CELLS BLD QL SMEAR: NORMAL
SODIUM SERPL-SCNC: 137 MMOL/L (ref 136–145)
TARGETS BLD QL SMEAR: NORMAL
TOTAL CELLS COUNTED BLD: 116
WBC # BLD AUTO: 10.2 X10*3/UL (ref 4.4–11.3)

## 2024-07-16 PROCEDURE — 1170000001 HC PRIVATE ONCOLOGY ROOM DAILY

## 2024-07-16 PROCEDURE — 2500000004 HC RX 250 GENERAL PHARMACY W/ HCPCS (ALT 636 FOR OP/ED)

## 2024-07-16 PROCEDURE — 80053 COMPREHEN METABOLIC PANEL: CPT | Performed by: NURSE PRACTITIONER

## 2024-07-16 PROCEDURE — 82248 BILIRUBIN DIRECT: CPT | Performed by: HOSPITALIST

## 2024-07-16 PROCEDURE — 83615 LACTATE (LD) (LDH) ENZYME: CPT | Performed by: NURSE PRACTITIONER

## 2024-07-16 PROCEDURE — 86880 COOMBS TEST DIRECT: CPT

## 2024-07-16 PROCEDURE — 2500000005 HC RX 250 GENERAL PHARMACY W/O HCPCS: Performed by: NURSE PRACTITIONER

## 2024-07-16 PROCEDURE — 85045 AUTOMATED RETICULOCYTE COUNT: CPT | Performed by: NURSE PRACTITIONER

## 2024-07-16 PROCEDURE — 85027 COMPLETE CBC AUTOMATED: CPT | Performed by: NURSE PRACTITIONER

## 2024-07-16 PROCEDURE — 2500000001 HC RX 250 WO HCPCS SELF ADMINISTERED DRUGS (ALT 637 FOR MEDICARE OP): Performed by: NURSE PRACTITIONER

## 2024-07-16 PROCEDURE — 85610 PROTHROMBIN TIME: CPT

## 2024-07-16 PROCEDURE — 85007 BL SMEAR W/DIFF WBC COUNT: CPT | Performed by: NURSE PRACTITIONER

## 2024-07-16 PROCEDURE — 99223 1ST HOSP IP/OBS HIGH 75: CPT

## 2024-07-16 PROCEDURE — 36415 COLL VENOUS BLD VENIPUNCTURE: CPT | Performed by: HOSPITALIST

## 2024-07-16 PROCEDURE — 82330 ASSAY OF CALCIUM: CPT

## 2024-07-16 PROCEDURE — 83021 HEMOGLOBIN CHROMOTOGRAPHY: CPT

## 2024-07-16 PROCEDURE — 36415 COLL VENOUS BLD VENIPUNCTURE: CPT | Performed by: NURSE PRACTITIONER

## 2024-07-16 RX ORDER — SODIUM CHLORIDE 450 MG/100ML
75 INJECTION, SOLUTION INTRAVENOUS CONTINUOUS
Status: DISCONTINUED | OUTPATIENT
Start: 2024-07-16 | End: 2024-07-20

## 2024-07-16 ASSESSMENT — PAIN - FUNCTIONAL ASSESSMENT
PAIN_FUNCTIONAL_ASSESSMENT: 0-10

## 2024-07-16 ASSESSMENT — COGNITIVE AND FUNCTIONAL STATUS - GENERAL
MOBILITY SCORE: 24
DAILY ACTIVITIY SCORE: 24

## 2024-07-16 ASSESSMENT — PAIN SCALES - GENERAL
PAINLEVEL_OUTOF10: 7
PAINLEVEL_OUTOF10: 9
PAINLEVEL_OUTOF10: 9

## 2024-07-16 NOTE — PROGRESS NOTES
"Joellen Narayan is a 23 y.o. male on day 8 of admission presenting with Sickle cell crisis (Multi).    Subjective   Pt seen at bedside this AM. Reports pain still an issue, not ready to wean today. We discussed awaiting AM labs at time of morning rounds as they were still not back yet. Pt hoping to discharge Thurs possibly. Endorses one episode of nausea otherwise no new concerns at this time.        Objective     Physical Exam  Constitutional:       Appearance: Normal appearance.   HENT:      Head: Normocephalic.      Right Ear: External ear normal.      Left Ear: External ear normal.      Nose: Nose normal.   Eyes:      General: Scleral icterus present.      Extraocular Movements: Extraocular movements intact.   Cardiovascular:      Rate and Rhythm: Normal rate and regular rhythm.   Pulmonary:      Effort: Pulmonary effort is normal.      Breath sounds: Normal breath sounds.   Abdominal:      General: Bowel sounds are normal.      Palpations: Abdomen is soft.   Musculoskeletal:         General: Normal range of motion.      Cervical back: Normal range of motion and neck supple.   Skin:     General: Skin is warm and dry.   Neurological:      Mental Status: He is alert and oriented to person, place, and time. Mental status is at baseline.   Psychiatric:         Mood and Affect: Mood normal.         Behavior: Behavior normal.         Thought Content: Thought content normal.         Judgment: Judgment normal.         Last Recorded Vitals  Blood pressure 131/60, pulse 60, temperature 36.6 °C (97.9 °F), temperature source Temporal, resp. rate 16, height 1.88 m (6' 2\"), weight 72.6 kg (160 lb), SpO2 94%.  Intake/Output last 3 Shifts:  No intake/output data recorded.    Relevant Results     .Scheduled medications  diclofenac sodium, 4 g, Topical, 4x daily  enoxaparin, 40 mg, subcutaneous, q24h  folic acid, 1 mg, oral, Daily  lidocaine, 1 patch, transdermal, Daily  pantoprazole, 40 mg, oral, Daily before " breakfast  polyethylene glycol, 17 g, oral, Daily  sennosides-docusate sodium, 2 tablet, oral, BID      Continuous medications     PRN medications  PRN medications: diphenhydrAMINE, HYDROmorphone, naproxen, ondansetron, oxygen    .  Results for orders placed or performed during the hospital encounter of 07/08/24 (from the past 24 hour(s))   CMV DNA, quantitative, PCR   Result Value Ref Range    Cytomegalovirus DNA, PCR Log IU/ML      CMV DNA Result Not Detected Not Detected   CBC and Auto Differential   Result Value Ref Range    WBC 10.2 4.4 - 11.3 x10*3/uL    nRBC 14.2 (H) 0.0 - 0.0 /100 WBCs    RBC 2.19 (L) 4.50 - 5.90 x10*6/uL    Hemoglobin 6.8 (L) 13.5 - 17.5 g/dL    Hematocrit 19.0 (L) 41.0 - 52.0 %    MCV 87 80 - 100 fL    MCH 31.1 26.0 - 34.0 pg    MCHC 35.8 32.0 - 36.0 g/dL    RDW 24.4 (H) 11.5 - 14.5 %    Platelets 202 150 - 450 x10*3/uL    Immature Granulocytes %, Automated 2.5 (H) 0.0 - 0.9 %    Immature Granulocytes Absolute, Automated 0.25 0.00 - 0.70 x10*3/uL   Lactate Dehydrogenase   Result Value Ref Range     (H) 84 - 246 U/L   Reticulocytes   Result Value Ref Range    Retic % 29.8 (H) 0.5 - 2.0 %    Retic Absolute 0.652 (H) 0.022 - 0.118 x10*6/uL    Reticulocyte Hemoglobin 32 28 - 38 pg    Immature Retic fraction 37.1 (H) <=16.0 %   Comprehensive Metabolic Panel   Result Value Ref Range    Glucose 81 74 - 99 mg/dL    Sodium 137 136 - 145 mmol/L    Potassium 4.0 3.5 - 5.3 mmol/L    Chloride 101 98 - 107 mmol/L    Bicarbonate 27 21 - 32 mmol/L    Anion Gap 13 10 - 20 mmol/L    Urea Nitrogen 6 6 - 23 mg/dL    Creatinine 0.74 0.50 - 1.30 mg/dL    eGFR >90 >60 mL/min/1.73m*2    Calcium 9.5 8.6 - 10.6 mg/dL    Albumin 4.2 3.4 - 5.0 g/dL    Alkaline Phosphatase 185 (H) 33 - 120 U/L    Total Protein 6.0 (L) 6.4 - 8.2 g/dL     (H) 9 - 39 U/L    Bilirubin, Total 37.8 (H) 0.0 - 1.2 mg/dL    ALT 92 (H) 10 - 52 U/L   Manual Differential   Result Value Ref Range    Neutrophils %, Manual 63.8 40.0 -  80.0 %    Bands %, Manual 0.9 0.0 - 5.0 %    Lymphocytes %, Manual 20.7 13.0 - 44.0 %    Monocytes %, Manual 9.5 2.0 - 10.0 %    Eosinophils %, Manual 3.4 0.0 - 6.0 %    Basophils %, Manual 0.0 0.0 - 2.0 %    Myelocytes %, Manual 1.7 0.0 - 0.0 %    Seg Neutrophils Absolute, Manual 6.51 1.20 - 7.00 x10*3/uL    Bands Absolute, Manual 0.09 0.00 - 0.70 x10*3/uL    Lymphocytes Absolute, Manual 2.11 1.20 - 4.80 x10*3/uL    Monocytes Absolute, Manual 0.97 0.10 - 1.00 x10*3/uL    Eosinophils Absolute, Manual 0.35 0.00 - 0.70 x10*3/uL    Basophils Absolute, Manual 0.00 0.00 - 0.10 x10*3/uL    Myelocytes Absolute, Manual 0.17 0.00 - 0.00 x10*3/uL    Total Cells Counted 116     Neutrophils Absolute, Manual 6.60 1.20 - 7.70 x10*3/uL    RBC Morphology See Below     Polychromasia Mild     RBC Fragments Few     Sickle Cells Many     Target Cells Few     Ovalocytes Few     Oberlin Cells Few     Porter-La Alianza Bodies Present     Pappenheimer Bodies Present    Bilirubin, Direct   Result Value Ref Range    Bilirubin, Direct 18.4 (H) 0.0 - 0.3 mg/dL           Assessment/Plan   Principal Problem:    Sickle cell crisis (Multi)    Joellen Narayan is a 23 y.o. 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 7/8 Troponin negative, EKG NSR with PACs, no T wave inversion. CXR (7/8) without evidence of acute process. Lysis labs near pt's baseline, however as of 7/10 slightly uptrending. Started on IV dilaudid for pain management (7/8- current). 7/11 Repeat CXR unchanged from prior. LFTs began to  uptrend 7/12. 7/14 RUQ U/S showing cholelithiasis without cholecystitis, increased hepatic parenchymal echogenicity 2/2 mild diffuse fatty infiltration versus hepatocellular disease. Given acute increase followed by slow decline, original ddx likely r/t transient choledocholithiasis however 7/16 Tbili spiked 37.8, CT A/P ordered however MRCP pending after discussion with radiology. 7/16 Heme consulted for c/f sickle hepatopathy, recs pending. Exchange labs ordered 7/16. DC home resumed O2 pending improvement in pain, LFT workup, and Heme recs.     # transaminitis  # hyperbilirubinemia  - Baseline tbili ~ 6-7  - Likely reactive 2/2 crisis vs c/f developing sickle hepatopathy vs choledocholithiasis vs obstructive cholestasis from tumor    - on admit , ALT 28, AST 69, tbili 7.7 --> (7/10) increased 139/47/115/bili 11.6 --> (7/12) 156/89/128/t bili 23.2 --> (7/16) 185/92/115  - Pt denies RUQ pain, no fever/chills noted (7/10-current), 7/16 some nausea reported however pt does have some intermittent nausea when on IV opioids however not different than his baseline when hospitalized   - Direct tbili 3.3 (7/10) --> 8.8 (7/12) --> 18.4 (7/16)   - GGT (7/14) 159  - s/p D51/2NS @ 75 (7/10--7/11)  - 7/14 RUQ U/S showing cholelithiasis without cholecystitis, increased hepatic parenchymal echogenicity 2/2 mild diffuse fatty infiltration versus hepatocellular disease  - 7/13 Hepatitis panel negative, 7/15 CMV negative  - given acute increase followed by slow decline, original thought was likely r/t transient choledocholithiasis  - pt to follow up with Dr Stoll and Dr Cruz outpatient for continued monitoring once discharged    - 7/16 Tbili dramatically increased 37.8, CT A/P ordered however switched to MRCP after d/w radiology, pending (per MRI, likely will not get done today but will tomorrow 7/17), will be NPO after midnight   - If spikes fever or worsening abd pain/n/v, start Unasyn and consult GI - pt continues  afebrile, no leukocytosis, and hemodynamically stable   - 7/16 Hematology consulted c/f sickle hepatopathy and possible need for RBCex, recs pending      # Hgb SS with acute on chronic pain  - OARRS reviewed, no aberrancies  - No current care path  - Most recent urgent RBC exchange Feb 2024 for Acute Chest Syndrome  - Hgb baseline ~ 7-8, Hgb 9.9 (7/8)--> Hgb 7.5 (7/10) ->6.9 (7/12) --> 6.8 (7/16); no current indication for simple transfusion at this time   - LDH baseline fluctuates but ~500,  (7/16)  - Mild leukocytosis noted on admission consistent with previous admissions. WBC 12.5k (7/8)--> improved to 8.4 (7/10); likely reactive as pt afebrile with no s/sx of infectious process   - Pt with CP and SOB, however these are all consistent with pt's baseline presentation of sickle cell crisis. In the absence of imaging findings c/f infection or ACS, no wheezing, and no fevers-- low c/f ACS at this time, however will closely monitor  - CXR (7/8) without evidence of acute process  - 7/11 overnight repeat CXR unchanged from prior   - Troponin (7/8): 12  - EKG (7/8): NSR with PACs, no T wave inversion,   - On admit started IV dilaudid 3mg q2hrs PRN severe pain (7/8- 7/10), increased to 3.5mg IVP in afternoon d/t unrelieved pain (7/10-7/11), pt declining PCA  - 7/11 Increased to 4mg dilaudid q2 PRN severe pain and then weaned to q3h on (7/14- current)  - S/p IV Toradol 30mg q6hrs x3 days (7/8-7/11) with Protonix for PPI prophy  - Supportive care: lidocaine patch, voltaren gel x4/day, hot packs   - Continue home folic acid 1mg daily  - Utox (7/8): prelim + cannabinoid  - Hgb S (7/8) 74.2%  - PO Zofran and compazine PRN for opioid-induced nausea, PO Benadryl PRN for opioid-induced pruritus, bowel regimen for opioid-induced constipation with DocuSenna 2tabs BID and Miralax daily (LBM 7/10)  - 7/16 Exchange labs and HELLEN ordered     # Nodular lymphocyte predominant Hodgkins lymphoma (NLPHL)   - Primary Oncologist:  Dr. Stoll - updated by attending 7/15   - Enlarged lymph nodes noted 4/1/22  - RUQ US (11/14/22) with mildly enlarged LNs in the region of the kavin hepatis  - MRI liver (11/16/22) showed re-demonstration of bulky retroperitoneal lymphadenopathy and kavin hepatic lymphadenopathy    - (11/18/22) lymph node biopsy showed atypical lymphoid infiltrate. Reviewed by Hemepath board, insufficient for lymphoma diagnosis  - PET/CT (12/6/22) showing retroperitoneal lymphadenopathy  - Followed up with Dr. Stoll (12/16/22) with plan for surg/onc consult for large tissue bx but patient missed apt and was lost to follow up  - Requested new apt with Dr. Ronnie Marte 6/19/23, patient was not seen and lost to follow up  - CT a/p (11/28/23) increased size of retroperitoneal lymph nodes reflecting extramedullary hematopoiesis I/s/o sickle cell vs lymphoma  - Paraaortic LN bx via IR (11/30/23) consistent with NLPHL. Flow: no clonal B cell or T cell population identified, lymphocyte 95%, CD3+CD4+ 68%, CD3+CD8+ 7%, CD19+ 24%  - Elevated LDH/bili partially from sickle cell disease   - Chemotherapy (R-CHOP) was discussed with primary oncologist Dr. Stoll, and decision was made to simplify his chemotherapy to Rituxan and prednisone q3 weeks mainly due to frequent sickle cell crisis  - Current chemo regimen: rituxan and prednisone q3 weeks (C1 1/18/24, C2 2/8/24, Missed C3 d/t sickle cell pain crisis, C4 4/4/24, C5 5/16/24, C6 6/7/24)  - Per pt no longer taking Acyclovir 400mg BID prophy   - planned for re-staging PET CT 7/15. Will reschedule as outpatient (Dr Stoll notified)     # Hx Priapism  - Last episode 6/18; pt presented to the ED with Priapism. Urology consulted at that time and unable to aspirate blood, however rec no acute further interventions necessary as there was improvement with conservative management  - No active issues with priapism currently  - Can order Sudafed q4h PRN if reoccurs   - Pt missed urology FUV 7/2     #  Hx of nocturnal oxygen dependence and hypoxia on room air  - Historically improves with oxygen supplementation  - Pt hypoxic on admission (SpO2 84% on RA while asleep), placed on 2L supp O2    - CXR (7/8) without evidence of acute process; pt additionally denies SOB  - Has not had home oxygen for 2-3 years   - Pt did not qualify for home O2 per walking pulse ox performed during previous hospital admission on 2/26/24  - Wean as able, encourage incentive spirometry  - 6/2024 failed x1 walking pulse ox, 6/25 pt was on RA however failed walking pulse ox and was sent home on 2L continuous O2   - Pulm FUV 8/5     DVT prophy: Lovenox subcutaneous, SCDs, encourage ambulation     DISPO:  - Full code, confirmed on admit  - DC home resumed O2 pending improvement in pain, labs, and Heme recs   - NOK: Melissa Narayan (Parent) [phone]  - FUV PET/CT 7/15, Dr. Stoll 7/25, Pulm 8/8, Sickle Cell FUV requested/pending     I spent >120 minutes in the professional and overall care of this patient    Assessment and plan discussed with attending physicians Dr. Correia and Dr. Nancy Matt, APRN-CNP

## 2024-07-16 NOTE — CARE PLAN
The patient's goals for the shift include Get at least 6-8 hrs of sleep/rest during shift.    The clinical goals for the shift include pt will have controlled pain throughout the shift    Over the shift, the patient did  make progress toward the following goals.   Pt states some improvement of vikash. Continues to request 4mg  q3. .    Problem: Fall/Injury  Goal: Not fall by end of shift  Outcome: Progressing  Goal: Be free from injury by end of the shift  Outcome: Progressing  Goal: Verbalize understanding of personal risk factors for fall in the hospital  Outcome: Progressing  Goal: Verbalize understanding of risk factor reduction measures to prevent injury from fall in the home  Outcome: Progressing  Goal: Use assistive devices by end of the shift  Outcome: Progressing  Goal: Pace activities to prevent fatigue by end of the shift  Outcome: Progressing
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
## 2024-07-17 ENCOUNTER — APPOINTMENT (OUTPATIENT)
Dept: RADIOLOGY | Facility: HOSPITAL | Age: 24
DRG: 812 | End: 2024-07-17
Payer: COMMERCIAL

## 2024-07-17 ENCOUNTER — ANESTHESIA EVENT (OUTPATIENT)
Dept: GASTROENTEROLOGY | Facility: HOSPITAL | Age: 24
End: 2024-07-17
Payer: COMMERCIAL

## 2024-07-17 PROBLEM — K80.50 CHOLEDOCHOLITHIASIS: Status: ACTIVE | Noted: 2024-07-17

## 2024-07-17 LAB
ABO GROUP (TYPE) IN BLOOD: NORMAL
ALBUMIN SERPL BCP-MCNC: 4.2 G/DL (ref 3.4–5)
ALP SERPL-CCNC: 175 U/L (ref 33–120)
ALT SERPL W P-5'-P-CCNC: 82 U/L (ref 10–52)
ANION GAP SERPL CALC-SCNC: 13 MMOL/L (ref 10–20)
ANTIBODY SCREEN: NORMAL
AST SERPL W P-5'-P-CCNC: 100 U/L (ref 9–39)
BASO STIPL BLD QL SMEAR: PRESENT
BASOPHILS # BLD MANUAL: 0 X10*3/UL (ref 0–0.1)
BASOPHILS NFR BLD MANUAL: 0 %
BILIRUB DIRECT SERPL-MCNC: 22.8 MG/DL (ref 0–0.3)
BILIRUB SERPL-MCNC: 39.9 MG/DL (ref 0–1.2)
BLOOD EXPIRATION DATE: NORMAL
BUN SERPL-MCNC: 5 MG/DL (ref 6–23)
BURR CELLS BLD QL SMEAR: ABNORMAL
CALCIUM SERPL-MCNC: 9.4 MG/DL (ref 8.6–10.6)
CHLORIDE SERPL-SCNC: 100 MMOL/L (ref 98–107)
CO2 SERPL-SCNC: 28 MMOL/L (ref 21–32)
CREAT SERPL-MCNC: 0.6 MG/DL (ref 0.5–1.3)
DAT-POLYSPECIFIC: NORMAL
DISPENSE STATUS: NORMAL
EGFRCR SERPLBLD CKD-EPI 2021: >90 ML/MIN/1.73M*2
EOSINOPHIL # BLD MANUAL: 0.28 X10*3/UL (ref 0–0.7)
EOSINOPHIL NFR BLD MANUAL: 2.6 %
ERYTHROCYTE [DISTWIDTH] IN BLOOD BY AUTOMATED COUNT: 22.3 % (ref 11.5–14.5)
GLUCOSE SERPL-MCNC: 81 MG/DL (ref 74–99)
HCT VFR BLD AUTO: 19.3 % (ref 41–52)
HEMOGLOBIN A2: 3.3 % (ref 2–3.5)
HEMOGLOBIN A: 22.9 % (ref 95.8–98)
HEMOGLOBIN F: 2.3 % (ref 0–2)
HEMOGLOBIN IDENTIFICATION INTERPRETATION: ABNORMAL
HEMOGLOBIN S: 71.5 %
HGB BLD-MCNC: 7.1 G/DL (ref 13.5–17.5)
HGB RETIC QN: 33 PG (ref 28–38)
HOWELL-JOLLY BOD BLD QL SMEAR: PRESENT
IMM GRANULOCYTES # BLD AUTO: 0.14 X10*3/UL (ref 0–0.7)
IMM GRANULOCYTES NFR BLD AUTO: 1.3 % (ref 0–0.9)
IMMATURE RETIC FRACTION: 27.9 %
LDH SERPL L TO P-CCNC: 590 U/L (ref 84–246)
LYMPHOCYTES # BLD MANUAL: 1.22 X10*3/UL (ref 1.2–4.8)
LYMPHOCYTES NFR BLD MANUAL: 11.3 %
MCH RBC QN AUTO: 31.1 PG (ref 26–34)
MCHC RBC AUTO-ENTMCNC: 36.8 G/DL (ref 32–36)
MCV RBC AUTO: 85 FL (ref 80–100)
MONOCYTES # BLD MANUAL: 1.12 X10*3/UL (ref 0.1–1)
MONOCYTES NFR BLD MANUAL: 10.4 %
MYELOCYTES # BLD MANUAL: 0.19 X10*3/UL
MYELOCYTES NFR BLD MANUAL: 1.8 %
NEUTS SEG # BLD MANUAL: 7.7 X10*3/UL (ref 1.2–7)
NEUTS SEG NFR BLD MANUAL: 71.3 %
NRBC BLD-RTO: 10.7 /100 WBCS (ref 0–0)
OVALOCYTES BLD QL SMEAR: ABNORMAL
PAPPENHEIMER BOD BLD QL SMEAR: PRESENT
PATH REVIEW-HGB IDENTIFICATION: ABNORMAL
PLATELET # BLD AUTO: 195 X10*3/UL (ref 150–450)
POLYCHROMASIA BLD QL SMEAR: ABNORMAL
POTASSIUM SERPL-SCNC: 3.8 MMOL/L (ref 3.5–5.3)
PRODUCT BLOOD TYPE: 1700
PRODUCT CODE: NORMAL
PROT SERPL-MCNC: 6.1 G/DL (ref 6.4–8.2)
RBC # BLD AUTO: 2.28 X10*6/UL (ref 4.5–5.9)
RBC MORPH BLD: ABNORMAL
RETICS #: 0.67 X10*6/UL (ref 0.02–0.12)
RETICS/RBC NFR AUTO: 29.3 % (ref 0.5–2)
RH FACTOR (ANTIGEN D): NORMAL
SICKLE CELLS BLD QL SMEAR: ABNORMAL
SODIUM SERPL-SCNC: 137 MMOL/L (ref 136–145)
TARGETS BLD QL SMEAR: ABNORMAL
TOTAL CELLS COUNTED BLD: 115
UNIT ABO: NORMAL
UNIT NUMBER: NORMAL
UNIT RH: NORMAL
UNIT VOLUME: 350
VARIANT LYMPHS # BLD MANUAL: 0.28 X10*3/UL (ref 0–0.5)
VARIANT LYMPHS NFR BLD: 2.6 %
WBC # BLD AUTO: 10.8 X10*3/UL (ref 4.4–11.3)
XM INTEP: NORMAL

## 2024-07-17 PROCEDURE — 2500000004 HC RX 250 GENERAL PHARMACY W/ HCPCS (ALT 636 FOR OP/ED)

## 2024-07-17 PROCEDURE — 85045 AUTOMATED RETICULOCYTE COUNT: CPT | Performed by: NURSE PRACTITIONER

## 2024-07-17 PROCEDURE — 82248 BILIRUBIN DIRECT: CPT

## 2024-07-17 PROCEDURE — 85027 COMPLETE CBC AUTOMATED: CPT | Performed by: NURSE PRACTITIONER

## 2024-07-17 PROCEDURE — 1170000001 HC PRIVATE ONCOLOGY ROOM DAILY

## 2024-07-17 PROCEDURE — 99233 SBSQ HOSP IP/OBS HIGH 50: CPT

## 2024-07-17 PROCEDURE — 36415 COLL VENOUS BLD VENIPUNCTURE: CPT | Performed by: NURSE PRACTITIONER

## 2024-07-17 PROCEDURE — 83615 LACTATE (LD) (LDH) ENZYME: CPT | Performed by: NURSE PRACTITIONER

## 2024-07-17 PROCEDURE — 87040 BLOOD CULTURE FOR BACTERIA: CPT

## 2024-07-17 PROCEDURE — 86920 COMPATIBILITY TEST SPIN: CPT

## 2024-07-17 PROCEDURE — 2500000001 HC RX 250 WO HCPCS SELF ADMINISTERED DRUGS (ALT 637 FOR MEDICARE OP): Performed by: NURSE PRACTITIONER

## 2024-07-17 PROCEDURE — 84075 ASSAY ALKALINE PHOSPHATASE: CPT | Performed by: NURSE PRACTITIONER

## 2024-07-17 PROCEDURE — 99222 1ST HOSP IP/OBS MODERATE 55: CPT | Performed by: STUDENT IN AN ORGANIZED HEALTH CARE EDUCATION/TRAINING PROGRAM

## 2024-07-17 PROCEDURE — 74183 MRI ABD W/O CNTR FLWD CNTR: CPT | Performed by: STUDENT IN AN ORGANIZED HEALTH CARE EDUCATION/TRAINING PROGRAM

## 2024-07-17 PROCEDURE — 86901 BLOOD TYPING SEROLOGIC RH(D): CPT

## 2024-07-17 PROCEDURE — 74183 MRI ABD W/O CNTR FLWD CNTR: CPT

## 2024-07-17 PROCEDURE — 86038 ANTINUCLEAR ANTIBODIES: CPT

## 2024-07-17 PROCEDURE — 2550000001 HC RX 255 CONTRASTS: Performed by: HOSPITALIST

## 2024-07-17 PROCEDURE — A9575 INJ GADOTERATE MEGLUMI 0.1ML: HCPCS | Performed by: HOSPITALIST

## 2024-07-17 PROCEDURE — 36415 COLL VENOUS BLD VENIPUNCTURE: CPT

## 2024-07-17 PROCEDURE — P9040 RBC LEUKOREDUCED IRRADIATED: HCPCS

## 2024-07-17 PROCEDURE — 85007 BL SMEAR W/DIFF WBC COUNT: CPT | Performed by: NURSE PRACTITIONER

## 2024-07-17 PROCEDURE — 36430 TRANSFUSION BLD/BLD COMPNT: CPT

## 2024-07-17 PROCEDURE — 76376 3D RENDER W/INTRP POSTPROCES: CPT | Performed by: STUDENT IN AN ORGANIZED HEALTH CARE EDUCATION/TRAINING PROGRAM

## 2024-07-17 RX ORDER — GADOTERATE MEGLUMINE 376.9 MG/ML
14 INJECTION INTRAVENOUS
Status: COMPLETED | OUTPATIENT
Start: 2024-07-17 | End: 2024-07-17

## 2024-07-17 RX ORDER — LIDOCAINE HYDROCHLORIDE 20 MG/ML
1.25 SOLUTION OROPHARYNGEAL EVERY 6 HOURS PRN
Status: DISCONTINUED | OUTPATIENT
Start: 2024-07-17 | End: 2024-07-23 | Stop reason: HOSPADM

## 2024-07-17 ASSESSMENT — PAIN SCALES - GENERAL
PAINLEVEL_OUTOF10: 7
PAINLEVEL_OUTOF10: 6
PAINLEVEL_OUTOF10: 9
PAINLEVEL_OUTOF10: 7
PAINLEVEL_OUTOF10: 9
PAINLEVEL_OUTOF10: 7

## 2024-07-17 ASSESSMENT — PAIN - FUNCTIONAL ASSESSMENT
PAIN_FUNCTIONAL_ASSESSMENT: 0-10

## 2024-07-17 ASSESSMENT — COGNITIVE AND FUNCTIONAL STATUS - GENERAL
MOBILITY SCORE: 24
MOBILITY SCORE: 24
DAILY ACTIVITIY SCORE: 24
DAILY ACTIVITIY SCORE: 24

## 2024-07-17 ASSESSMENT — PAIN DESCRIPTION - LOCATION
LOCATION: CHEST

## 2024-07-17 NOTE — CARE PLAN
Problem: Pain  Goal: Takes deep breaths with improved pain control throughout the shift  Outcome: Progressing  Goal: Turns in bed with improved pain control throughout the shift  Outcome: Progressing  Goal: Walks with improved pain control throughout the shift  Outcome: Progressing  Goal: Performs ADL's with improved pain control throughout shift  Outcome: Progressing  Goal: Participates in PT with improved pain control throughout the shift  Outcome: Progressing  Goal: Free from opioid side effects throughout the shift  Outcome: Progressing  Goal: Free from acute confusion related to pain meds throughout the shift  Outcome: Progressing   The patient's goals for the shift include Get at least 6-8 hrs of sleep/rest during shift.    The clinical goals for the shift include pt will have controlled pain throughout the shift    Over the shift, the patient did not make progress toward the following goals. Barriers to progression include sickle cell pain. Recommendations to address these barriers include be prompt and timely with medication administration.

## 2024-07-17 NOTE — PROGRESS NOTES
Joellen Narayan is a 23 y.o. male on day 9 of admission presenting with Sickle cell crisis (Multi).      Subjective   Patient seen after MRCP. Complains of pain on the left side. No acute events overnight.       Objective     Last Recorded Vitals  /65 (BP Location: Right arm, Patient Position: Lying)   Pulse 69   Temp 36.8 °C (98.2 °F) (Temporal)   Resp 16   Wt 72.6 kg (160 lb)   SpO2 95%   Intake/Output last 3 Shifts:    Intake/Output Summary (Last 24 hours) at 7/17/2024 1314  Last data filed at 7/17/2024 0830  Gross per 24 hour   Intake 0 ml   Output --   Net 0 ml       Admission Weight  Weight: 72.6 kg (160 lb) (07/08/24 2305)    Daily Weight  07/08/24 : 72.6 kg (160 lb)    Image Results  MRCP pancreas w and wo IV contrast  Narrative: Interpreted By:  Cyrus Grace,   STUDY:  MRCP PANCREAS W AND WO IV CONTRAST;  7/17/2024 10:46 am      INDICATION:  Signs/Symptoms:c/f sickle hepatopathy, worsening hyperbilirubinemia.      COMPARISON:  Ultrasound of the liver dated 07/14/2024. CT of the chest, abdomen,  and pelvis dated 06/19/24 and MRI of the liver dated 11/16/2022      ACCESSION NUMBER(S):  [id number]      ORDERING CLINICIAN:  LINDY ARCEO      TECHNIQUE:  MRI PANCREAS; Multiplanar magnetic resonance images of the abdomen  were obtained including the following sequences; T2-weighted SSFSE  with and without fat saturation, T1-weighted GRE in/opposed phase,  DWI, fat saturated 3D-T1w GRE pre and dynamically post contrast.  Radial thick slab T2w RARE MRCP and coronally reconstructed navigator  gated high resolution 3-D T2w RESTORE MRCP with MIP reconstruction  were also performed for MRCP.  14 ML of Dotarem was administered  intravenously without immediate complication.      FINDINGS:  LIVER:  The liver is enlarged measuring 20.3cm in largest craniocaudal  extension, similar compared to prior examination. The contour is  smooth.  The liver parenchyma demonstrates diffusely decreased signal  intensity on  T2w and T1w in phase imaging suggestive of iron  overload, new compared to prior examination. No liver mass.      BILE DUCTS:  There is mild intrahepatic and extrahepatic biliary ductal dilation  with the common bile duct measuring up to 1.0 cm which is new  compared to most recent CT dated 06/18/2024. There is a questionable  T2 hypointense filling defect at the ampulla of Vater measuring 3 mm  as seen on series 14, image 33 and series 5, image 28.      GALLBLADDER:  The gallbladder is nondistended, containing multiple gallstones  measuring to 1.0 cm, similar compared to prior examination. No  evidence of cholecystitis.      PANCREAS:  Normal signal intensity. Normal enhancement. No masses. The  pancreatic duct is normal.      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      ADRENAL GLANDS:  Within normal limits.      KIDNEYS:  There is a 1.0 cm T1 hypointense diffusion restricting lesion within  the superior pole of the right kidney as seen on series 37, image 39  which was not evident on prior examination; the lesion is not well  visualized on arterial phase imaging but demonstrates hypoenhancement  on subsequent postcontrast imaging. The kidneys are otherwise are  normal in size and demonstrate expected cortical enhancement. No  hydronephrosis. No renal masses      LYMPH NODES:  No lymphadenopathy.      ABDOMINAL VESSELS:  Aorta and the major abdominal arterial vessels demonstrate no gross  abnormality.  Superior mesenteric vein, splenic vein, and main, right  and left portal vein are patent. Hepatic veins are patent.  No  significant collaterals or esophageal varices are present.      BOWEL:  Within normal limits.      PERITONEUM/RETROPERITONEUM:  No ascites. There is bulky T1 hyperintense, T2 hyperintense,  enhancing soft tissue within the retroperitoneum for example seen on  series 14 image 46 which is significantly decreased in size  compared  to prior examination and compatible with extramedullary hematopoiesis.      BONES AND LOWER THORAX:  Within normal limits.      Impression: 1.  Interval development of mild intrahepatic and extrahepatic  biliary ductal dilation with the common bile duct measuring up to 1.0  cm. Filling defect measuring 0.3 cm at the ampulla of Vater is  compatible with choledocholithiasis. Gastroenterological or surgical  consultation is recommended.  2. Diffusion restricting hypoenhancing lesion within the superior  pole of the right kidney measuring 1.0 cm is favored to represent and  acute renal infarct given patient's history; however, given  nonvisualization on arterial phase imaging consider follow-up with CT  or MRI.  3. Interval development of hepatic iron overload. Additional sequela  of sickle cell disease as described above.  4. Cholelithiasis without evidence of cholecystitis.      I personally reviewed the images/study with Cyrus Grace MD (Radiology  Resident) and I agree with the findings as stated.      MACRO:  Critical Finding:  See findings. Notification was initiated on  7/17/2024 at 12:16 pm by  Cyrus Grace.  (**-YCF-**) Instructions:          Dictation workstation:   [id number]      Physical Exam        Relevant Results    Results from last 72 hours   Lab Units 07/17/24  0817 07/17/24  0813 07/16/24  1543 07/16/24  0714 07/15/24  0605   WBC AUTO x10*3/uL 10.8  --   --  10.2 13.4*   HEMOGLOBIN g/dL 7.1*  --   --  6.8* 7.5*   HEMATOCRIT % 19.3*  --   --  19.0* 20.9*   PLATELETS AUTO x10*3/uL 195  --   --  202 270   INR   --   --  1.2*  --   --    SODIUM mmol/L  --  137  --  137 136   POTASSIUM mmol/L  --  3.8  --  4.0 3.7   CHLORIDE mmol/L  --  100  --  101 98   CO2 mmol/L  --  28  --  27 26   BUN mg/dL  --  5*  --  6 6   CREATININE mg/dL  --  0.60  --  0.74 0.68   GLUCOSE mg/dL  --  81  --  81 65*   CALCIUM mg/dL  --  9.4  --  9.5 9.6   ALBUMIN g/dL  --  4.2  --  4.2 4.4   ALK PHOS U/L  --  175*  --   185* 181*   ALT U/L  --  82*  --  92* 82*   AST U/L  --  100*  --  115* 100*   BILIRUBIN TOTAL mg/dL  --  39.9*  --  37.8* 24.5*          Current Facility-Administered Medications:     diclofenac sodium (Voltaren) 1 % gel 4 g, 4 g, Topical, 4x daily, YOHANNES TomCNP, 4 g at 07/15/24 2103    diphenhydrAMINE (BENADryl) capsule 25 mg, 25 mg, oral, q6h PRN, RAVI Khoury    enoxaparin (Lovenox) syringe 40 mg, 40 mg, subcutaneous, q24h, RAAD Khoury-CNP, 40 mg at 07/14/24 0824    folic acid (Folvite) tablet 1 mg, 1 mg, oral, Daily, RAVI Khoury, 1 mg at 07/17/24 0831    HYDROmorphone PF (Dilaudid) injection 4 mg, 4 mg, intravenous, q4h PRN, RAVI Tom    lidocaine 4 % patch 1 patch, 1 patch, transdermal, Daily, RAAD Khoury-CNP, 1 patch at 07/14/24 0825    naproxen (Naprosyn) tablet 500 mg, 500 mg, oral, q12h PRN, RAVI Tom    ondansetron (Zofran) injection 4 mg, 4 mg, intravenous, q6h PRN, RAVI Tom, 4 mg at 07/16/24 1842    oxygen (O2) therapy, , inhalation, Continuous PRN - O2/gases, RAAD Khoury-CNP, 2 L/min at 07/16/24 0813    pantoprazole (ProtoNix) EC tablet 40 mg, 40 mg, oral, Daily before breakfast, RAAD Khoury-CNP, 40 mg at 07/17/24 0831    polyethylene glycol (Glycolax, Miralax) packet 17 g, 17 g, oral, Daily, RAAD Khoury-CNP, 17 g at 07/13/24 0900    sennosides-docusate sodium (Promise-Colace) 8.6-50 mg per tablet 2 tablet, 2 tablet, oral, BID, Jenise Jenkins APRN-CNP, 2 tablet at 07/14/24 0824    sodium chloride 0.45 % infusion, 75 mL/hr, intravenous, Continuous, RAAD Tom-CNP, Last Rate: 75 mL/hr at 07/17/24 0039, 75 mL/hr at 07/17/24 0039       Assessment/Plan      Principal Problem:    Sickle cell crisis (Multi)    Joellen Narayan is a 23 y.o. 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 Hematology was consulted for worsening hyperbilirubinemia.     Updates 7/17  :: HbS 71.5% (down from 74.2% 9days ago).  :: T.bili is up to 39.9 (from 37.8 yesterday), D.bili up to 22.8 (from 18.4).  :: MRCP today shows choledocholithiasis and cholelithiasis, no lymphadenopathy. GI consulted.    #Hyperbilirubinemia  :: Baseline Hb: ~7-8 (7.1 today).  :: Direct bilirubin 3.3 (7/10) --> 8.8 (7/12) --> 18.4 (7/16) --> 22.8 (7/17)  :: Total bilirubin 11.6 (7/10) --> 23.3 (7/12) --> 37.8 (7/16) --> 39.9(7/17)  ::  (7/10) --> 562 (7/12) --> 594 (7/16) --> 590 (7/17)  :: GGT (7/14) 159,  (7/10) --> 156 (7/12)--> 185 (7/16)  :: Coags (7/16): INR 1.2, PT 13.6, aPTT 28  :: 7/14 RUQ U/S showing cholelithiasis without cholecystitis, increased hepatic parenchymal echogenicity 2/2 mild diffuse fatty infiltration versus hepatocellular disease.  :: 7/13 Hepatitis panel negative, 7/15 CMV negative.  :: Medication review not positive for any known culprit.  :: Presentation consistent with hepatic vaso-occlusive crisis kiv Periportal adenopathy.    Recommendations:  - Continue hydrating with IVF.  - Keep Hb >10g/dl if undergoing any procedure/surgery.  - Considering the significant findings from MRCP, we have no plans to exchange.  - Rest of care per GI recs.    Shahnaz Dsouza MD  PGY1 Internal Medicine Resident
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
## 2024-07-17 NOTE — PROGRESS NOTES
Music Therapy Note      Therapy Session  Referral Type: Referral from previous admission  Visit Type: New visit  Session Start Time: 1450  Intervention Delivery: In-person  Conflict of Service: Working with other staff               Treatment/Interventions            Narrative  Assessment Detail: Found pt working with other staff at time of attempted visit.  Plan: Introduce services and assess goals/ preferences in music therapy.  Follow-up: Will continue to follow.    Education Documentation  No documentation found.

## 2024-07-17 NOTE — PROGRESS NOTES
"Joellen Narayan is a 23 y.o. male on day 9 of admission presenting with Sickle cell crisis (Multi).    Subjective   Pt seen at bedside this AM. Reports doing okay, pain is better agreeable to space frequency of dilaudid to q4. We discussed plan for MRCP today and further steps pending results. Still awaiting AM labs results at time of rounds. He is aware there is a possibility of RBC exchange pending current workup and he is agreeable if need be. He reports nausea improved and says his vomiting episodes were a couple of days ago. Denies RUQ pain, fever, chills, SOB, abd pain, constipation, diarrhea, headaches, bleeding, or edema.        Objective     Physical Exam  Constitutional:       Appearance: Normal appearance.   HENT:      Head: Normocephalic.      Right Ear: External ear normal.      Left Ear: External ear normal.      Nose: Nose normal.   Eyes:      General: Scleral icterus present.      Extraocular Movements: Extraocular movements intact.   Cardiovascular:      Rate and Rhythm: Normal rate and regular rhythm.   Pulmonary:      Effort: Pulmonary effort is normal.   Abdominal:      Palpations: Abdomen is soft.   Musculoskeletal:      Cervical back: Normal range of motion and neck supple.   Skin:     General: Skin is warm and dry.   Neurological:      General: No focal deficit present.      Mental Status: He is alert and oriented to person, place, and time. Mental status is at baseline.   Psychiatric:         Mood and Affect: Mood normal.         Behavior: Behavior normal.         Thought Content: Thought content normal.         Last Recorded Vitals  Blood pressure 128/68, pulse 71, temperature 36.7 °C (98.1 °F), temperature source Temporal, resp. rate 16, height 1.88 m (6' 2\"), weight 72.6 kg (160 lb), SpO2 94%.  Intake/Output last 3 Shifts:  No intake/output data recorded.    Relevant Results     .Scheduled medications  diclofenac sodium, 4 g, Topical, 4x daily  enoxaparin, 40 mg, subcutaneous, q24h  folic " acid, 1 mg, oral, Daily  lidocaine, 1 patch, transdermal, Daily  pantoprazole, 40 mg, oral, Daily before breakfast  polyethylene glycol, 17 g, oral, Daily  sennosides-docusate sodium, 2 tablet, oral, BID      Continuous medications  sodium chloride, 75 mL/hr, Last Rate: 75 mL/hr (07/17/24 0039)      PRN medications  PRN medications: diphenhydrAMINE, HYDROmorphone, naproxen, ondansetron, oxygen    .  Results for orders placed or performed during the hospital encounter of 07/08/24 (from the past 24 hour(s))   Bilirubin, Direct   Result Value Ref Range    Bilirubin, Direct 18.4 (H) 0.0 - 0.3 mg/dL   Coagulation Screen   Result Value Ref Range    Protime 13.6 (H) 9.8 - 12.8 seconds    INR 1.2 (H) 0.9 - 1.1    aPTT 28 27 - 38 seconds   Calcium, ionized   Result Value Ref Range    POCT Calcium, Ionized 1.11 1.1 - 1.33 mmol/L         Assessment/Plan   Principal Problem:    Sickle cell crisis (Multi)    Joellen Narayan is a 23 y.o. 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 7/8 Troponin negative, EKG NSR with PACs, no T wave inversion. CXR (7/8) without evidence of acute process. Lysis labs near pt's baseline, however as of 7/10 slightly uptrending. Started on IV dilaudid for pain management (7/8- current). 7/11 Repeat CXR unchanged from prior. LFTs began to uptrend 7/12. 7/14 RUQ U/S showing cholelithiasis without cholecystitis, increased hepatic parenchymal echogenicity 2/2 mild diffuse fatty infiltration versus hepatocellular disease. Given acute increase followed  by slow decline, original ddx likely r/t transient choledocholithiasis however 7/16 Tbili spiked 37.8, CT A/P ordered originally however MRCP pending 7/17 after discussion with radiology. 7/16 Heme consulted for c/f sickle hepatopathy, rec no urgent need for exchange yet, start IVF, agree with MRCP, and plan to exchange him 7/17 if no other cause of liver damage is apparent Exchange labs results pending 7/16. DC home resumed O2 pending improvement in pain, LFT workup, and Heme recs.      # transaminitis  # hyperbilirubinemia  - Baseline tbili ~ 6-7  - Likely reactive 2/2 crisis vs c/f developing sickle hepatopathy vs choledocholithiasis vs obstructive cholestasis from tumor    - on admit , ALT 28, AST 69, tbili 7.7 --> (7/10) increased 139/47/115/bili 11.6 --> (7/12) 156/89/128/t bili 23.2 --> (7/16) 185/92/115  - Pt denies RUQ pain, no fever/chills noted (7/10-current), 7/16 some nausea reported however pt does have some intermittent nausea when on IV opioids however not different than his baseline when hospitalized   - Direct tbili 3.3 (7/10) --> 8.8 (7/12) --> 18.4 (7/16)   - GGT (7/14) 159  - s/p D51/2NS @ 75 (7/10--7/11)  - 7/14 RUQ U/S showing cholelithiasis without cholecystitis, increased hepatic parenchymal echogenicity 2/2 mild diffuse fatty infiltration versus hepatocellular disease  - 7/13 Hepatitis panel negative, 7/15 CMV negative  - given acute increase followed by slow decline, original thought was likely r/t transient choledocholithiasis  - pt to follow up with Dr Stoll and Dr Cruz outpatient for continued monitoring once discharged    - 7/16 Tbili dramatically increased 37.8, CT A/P ordered however switched to MRCP after d/w radiology  - 7/17 MRCP pending   - If spikes fever or worsening abd pain/n/v, start Unasyn and consult GI - pt continues afebrile, no leukocytosis, and hemodynamically stable   - 7/16 Hematology consulted c/f sickle hepatopathy and possible need for RBCex, no urgent  RBCex yet, rec hydration, repeat Hg S, agree with MRCP tomorrow, TOMAS, exchange labs, and plan to exchange him tomorrow if no other cause of liver damage is apparent   - Pre-exchange labs pending, started 1/2NS (7/16-current)     # Hgb SS with acute on chronic pain  - OARRS reviewed, no aberrancies  - No current care path  - Most recent urgent RBC exchange Feb 2024 for Acute Chest Syndrome  - Hgb baseline ~ 7-8, Hgb 9.9 (7/8)--> Hgb 7.5 (7/10) ->6.9 (7/12) --> 7.1 (7/17); will discuss plan w/ Dr. Cruz for poss simple transfusion in setting of hematocrit < 20% and possible upcoming RBCex   - LDH baseline fluctuates but ~500,  (7/16)  - Mild leukocytosis noted on admission consistent with previous admissions. WBC 12.5k (7/8)--> improved to 10.8 (7/17); likely reactive as pt afebrile with no s/sx of infectious process   - Pt with CP and SOB, however these are all consistent with pt's baseline presentation of sickle cell crisis. In the absence of imaging findings c/f infection or ACS, no wheezing, and no fevers-- low c/f ACS at this time, however will closely monitor  - CXR (7/8) without evidence of acute process  - 7/11 overnight repeat CXR unchanged from prior   - Troponin (7/8): 12  - EKG (7/8): NSR with PACs, no T wave inversion,   - On admit started IV dilaudid 3mg q2hrs PRN severe pain (7/8- 7/10), increased to 3.5mg IVP in afternoon d/t unrelieved pain (7/10-7/11), pt declining PCA  - 7/11 Increased to 4mg dilaudid q2 PRN severe pain and then weaned to q3h on (7/14-7/17), spaced dilaudid to q4 PRN 7/17   - S/p IV Toradol 30mg q6hrs x3 days (7/8-7/11) with Protonix for PPI prophy  - Supportive care: lidocaine patch, voltaren gel x4/day, hot packs   - Continue home folic acid 1mg daily  - Utox (7/8): prelim + cannabinoid  - Hgb S (7/8) 74.2%, 7/16 Hg S 71.5%   - PO Zofran and compazine PRN for opioid-induced nausea, PO Benadryl PRN for opioid-induced pruritus, bowel regimen for opioid-induced  constipation with DocuSenna 2tabs BID and Miralax daily (LBM 7/10)  - 7/16 Exchange labs and HELLEN pending      # Nodular lymphocyte predominant Hodgkins lymphoma (NLPHL)   - Primary Oncologist: Dr. Stoll - updated by attending 7/15   - Enlarged lymph nodes noted 4/1/22  - RUQ US (11/14/22) with mildly enlarged LNs in the region of the kavin hepatis  - MRI liver (11/16/22) showed re-demonstration of bulky retroperitoneal lymphadenopathy and kavin hepatic lymphadenopathy    - (11/18/22) lymph node biopsy showed atypical lymphoid infiltrate. Reviewed by Hemepath board, insufficient for lymphoma diagnosis  - PET/CT (12/6/22) showing retroperitoneal lymphadenopathy  - Followed up with Dr. Stoll (12/16/22) with plan for surg/onc consult for large tissue bx but patient missed apt and was lost to follow up  - Requested new apt with Dr. Ronnie Marte 6/19/23, patient was not seen and lost to follow up  - CT a/p (11/28/23) increased size of retroperitoneal lymph nodes reflecting extramedullary hematopoiesis I/s/o sickle cell vs lymphoma  - Paraaortic LN bx via IR (11/30/23) consistent with NLPHL. Flow: no clonal B cell or T cell population identified, lymphocyte 95%, CD3+CD4+ 68%, CD3+CD8+ 7%, CD19+ 24%  - Elevated LDH/bili partially from sickle cell disease   - Chemotherapy (R-CHOP) was discussed with primary oncologist Dr. Stoll, and decision was made to simplify his chemotherapy to Rituxan and prednisone q3 weeks mainly due to frequent sickle cell crisis  - Current chemo regimen: rituxan and prednisone q3 weeks (C1 1/18/24, C2 2/8/24, Missed C3 d/t sickle cell pain crisis, C4 4/4/24, C5 5/16/24, C6 6/7/24)  - Per pt no longer taking Acyclovir 400mg BID prophy   - planned for re-staging PET CT 7/15. Will reschedule as outpatient (Dr Stoll notified)     # Hx Priapism  - Last episode 6/18; pt presented to the ED with Priapism. Urology consulted at that time and unable to aspirate blood, however rec no acute further  interventions necessary as there was improvement with conservative management  - No active issues with priapism currently  - Can order Sudafed q4h PRN if reoccurs   - Pt missed urology FUV 7/2     # Hx of nocturnal oxygen dependence and hypoxia on room air  - Historically improves with oxygen supplementation  - Pt hypoxic on admission (SpO2 84% on RA while asleep), placed on 2L supp O2    - CXR (7/8) without evidence of acute process; pt additionally denies SOB  - Has not had home oxygen for 2-3 years   - Pt did not qualify for home O2 per walking pulse ox performed during previous hospital admission on 2/26/24  - Wean as able, encourage incentive spirometry  - 6/2024 failed x1 walking pulse ox, 6/25 pt was on RA however failed walking pulse ox and was sent home on 2L continuous O2   - Pulm FUV 8/5     # DVT prophy: Lovenox subcutaneous, SCDs, encourage ambulation     # dispo   - Full code, confirmed on admit  - DC home resumed O2 pending improvement in pain, labs, and Heme recs   - NOK: Melissa Narayan (Parent) [phone]  - FUV PET/CT 7/15 (will need rescheduled), Dr. Stoll 7/25, Pulm 8/8, Sickle Cell FUV requested/pending     I spent >90 minutes in the professional and overall care of this patient     Assessment and plan discussed with attending physicians Dr. Bren Matt, APRN-CNP

## 2024-07-18 ENCOUNTER — ANESTHESIA (OUTPATIENT)
Dept: GASTROENTEROLOGY | Facility: HOSPITAL | Age: 24
End: 2024-07-18
Payer: COMMERCIAL

## 2024-07-18 ENCOUNTER — APPOINTMENT (OUTPATIENT)
Dept: GASTROENTEROLOGY | Facility: HOSPITAL | Age: 24
DRG: 812 | End: 2024-07-18
Payer: COMMERCIAL

## 2024-07-18 LAB
ALBUMIN SERPL BCP-MCNC: 4.1 G/DL (ref 3.4–5)
ALP SERPL-CCNC: 173 U/L (ref 33–120)
ALT SERPL W P-5'-P-CCNC: 74 U/L (ref 10–52)
ANA SER QL HEP2 SUBST: NEGATIVE
ANION GAP SERPL CALC-SCNC: 14 MMOL/L (ref 10–20)
APPEARANCE UR: ABNORMAL
AST SERPL W P-5'-P-CCNC: 99 U/L (ref 9–39)
BASOPHILS # BLD AUTO: 0.04 X10*3/UL (ref 0–0.1)
BASOPHILS NFR BLD AUTO: 0.4 %
BILIRUB SERPL-MCNC: 52.8 MG/DL (ref 0–1.2)
BILIRUB UR STRIP.AUTO-MCNC: ABNORMAL MG/DL
BUN SERPL-MCNC: 5 MG/DL (ref 6–23)
CALCIUM SERPL-MCNC: 9.5 MG/DL (ref 8.6–10.6)
CHLORIDE SERPL-SCNC: 100 MMOL/L (ref 98–107)
CO2 SERPL-SCNC: 26 MMOL/L (ref 21–32)
COLOR UR: ABNORMAL
CREAT SERPL-MCNC: 0.53 MG/DL (ref 0.5–1.3)
EGFRCR SERPLBLD CKD-EPI 2021: >90 ML/MIN/1.73M*2
EOSINOPHIL # BLD AUTO: 0.2 X10*3/UL (ref 0–0.7)
EOSINOPHIL NFR BLD AUTO: 2.2 %
ERYTHROCYTE [DISTWIDTH] IN BLOOD BY AUTOMATED COUNT: 18.5 % (ref 11.5–14.5)
GLUCOSE SERPL-MCNC: 74 MG/DL (ref 74–99)
GLUCOSE UR STRIP.AUTO-MCNC: NORMAL MG/DL
HCT VFR BLD AUTO: 27.7 % (ref 41–52)
HGB BLD-MCNC: 10.1 G/DL (ref 13.5–17.5)
HGB RETIC QN: 32 PG (ref 28–38)
IMM GRANULOCYTES # BLD AUTO: 0.14 X10*3/UL (ref 0–0.7)
IMM GRANULOCYTES NFR BLD AUTO: 1.5 % (ref 0–0.9)
IMMATURE RETIC FRACTION: 25.7 %
KETONES UR STRIP.AUTO-MCNC: NEGATIVE MG/DL
LDH SERPL L TO P-CCNC: 555 U/L (ref 84–246)
LEUKOCYTE ESTERASE UR QL STRIP.AUTO: NEGATIVE
LYMPHOCYTES # BLD AUTO: 0.8 X10*3/UL (ref 1.2–4.8)
LYMPHOCYTES NFR BLD AUTO: 8.6 %
MCH RBC QN AUTO: 30.3 PG (ref 26–34)
MCHC RBC AUTO-ENTMCNC: 36.5 G/DL (ref 32–36)
MCV RBC AUTO: 83 FL (ref 80–100)
MONOCYTES # BLD AUTO: 1.28 X10*3/UL (ref 0.1–1)
MONOCYTES NFR BLD AUTO: 13.8 %
NEUTROPHILS # BLD AUTO: 6.82 X10*3/UL (ref 1.2–7.7)
NEUTROPHILS NFR BLD AUTO: 73.5 %
NITRITE UR QL STRIP.AUTO: NEGATIVE
NRBC BLD-RTO: 6.5 /100 WBCS (ref 0–0)
PH UR STRIP.AUTO: 6.5 [PH]
PLATELET # BLD AUTO: 206 X10*3/UL (ref 150–450)
POTASSIUM SERPL-SCNC: 3.7 MMOL/L (ref 3.5–5.3)
PROT SERPL-MCNC: 5.7 G/DL (ref 6.4–8.2)
PROT UR STRIP.AUTO-MCNC: NEGATIVE MG/DL
RBC # BLD AUTO: 3.33 X10*6/UL (ref 4.5–5.9)
RBC # UR STRIP.AUTO: NEGATIVE /UL
RETICS #: 0.67 X10*6/UL (ref 0.02–0.12)
RETICS/RBC NFR AUTO: 20.2 % (ref 0.5–2)
SODIUM SERPL-SCNC: 136 MMOL/L (ref 136–145)
SP GR UR STRIP.AUTO: 1.01
UROBILINOGEN UR STRIP.AUTO-MCNC: ABNORMAL MG/DL
WBC # BLD AUTO: 9.3 X10*3/UL (ref 4.4–11.3)

## 2024-07-18 PROCEDURE — 36415 COLL VENOUS BLD VENIPUNCTURE: CPT | Performed by: NURSE PRACTITIONER

## 2024-07-18 PROCEDURE — 99232 SBSQ HOSP IP/OBS MODERATE 35: CPT | Performed by: STUDENT IN AN ORGANIZED HEALTH CARE EDUCATION/TRAINING PROGRAM

## 2024-07-18 PROCEDURE — 83615 LACTATE (LD) (LDH) ENZYME: CPT | Performed by: NURSE PRACTITIONER

## 2024-07-18 PROCEDURE — 81003 URINALYSIS AUTO W/O SCOPE: CPT

## 2024-07-18 PROCEDURE — 36430 TRANSFUSION BLD/BLD COMPNT: CPT

## 2024-07-18 PROCEDURE — 2500000005 HC RX 250 GENERAL PHARMACY W/O HCPCS: Performed by: NURSE ANESTHETIST, CERTIFIED REGISTERED

## 2024-07-18 PROCEDURE — 2500000005 HC RX 250 GENERAL PHARMACY W/O HCPCS: Performed by: INTERNAL MEDICINE

## 2024-07-18 PROCEDURE — 2500000004 HC RX 250 GENERAL PHARMACY W/ HCPCS (ALT 636 FOR OP/ED)

## 2024-07-18 PROCEDURE — 85025 COMPLETE CBC W/AUTO DIFF WBC: CPT | Performed by: NURSE PRACTITIONER

## 2024-07-18 PROCEDURE — P9040 RBC LEUKOREDUCED IRRADIATED: HCPCS

## 2024-07-18 PROCEDURE — 2720000007 HC OR 272 NO HCPCS

## 2024-07-18 PROCEDURE — 7100000001 HC RECOVERY ROOM TIME - INITIAL BASE CHARGE

## 2024-07-18 PROCEDURE — 3700000002 HC GENERAL ANESTHESIA TIME - EACH INCREMENTAL 1 MINUTE

## 2024-07-18 PROCEDURE — 85045 AUTOMATED RETICULOCYTE COUNT: CPT | Performed by: NURSE PRACTITIONER

## 2024-07-18 PROCEDURE — 7100000010 HC PHASE TWO TIME - EACH INCREMENTAL 1 MINUTE

## 2024-07-18 PROCEDURE — 1170000001 HC PRIVATE ONCOLOGY ROOM DAILY

## 2024-07-18 PROCEDURE — 74328 X-RAY BILE DUCT ENDOSCOPY: CPT | Performed by: STUDENT IN AN ORGANIZED HEALTH CARE EDUCATION/TRAINING PROGRAM

## 2024-07-18 PROCEDURE — 7100000009 HC PHASE TWO TIME - INITIAL BASE CHARGE

## 2024-07-18 PROCEDURE — 2500000004 HC RX 250 GENERAL PHARMACY W/ HCPCS (ALT 636 FOR OP/ED): Performed by: NURSE ANESTHETIST, CERTIFIED REGISTERED

## 2024-07-18 PROCEDURE — 3700000001 HC GENERAL ANESTHESIA TIME - INITIAL BASE CHARGE

## 2024-07-18 PROCEDURE — 7100000002 HC RECOVERY ROOM TIME - EACH INCREMENTAL 1 MINUTE

## 2024-07-18 PROCEDURE — 43264 ERCP REMOVE DUCT CALCULI: CPT | Performed by: INTERNAL MEDICINE

## 2024-07-18 PROCEDURE — 80053 COMPREHEN METABOLIC PANEL: CPT | Performed by: NURSE PRACTITIONER

## 2024-07-18 PROCEDURE — 0FC98ZZ EXTIRPATION OF MATTER FROM COMMON BILE DUCT, VIA NATURAL OR ARTIFICIAL OPENING ENDOSCOPIC: ICD-10-PCS | Performed by: INTERNAL MEDICINE

## 2024-07-18 PROCEDURE — 43262 ENDO CHOLANGIOPANCREATOGRAPH: CPT | Performed by: STUDENT IN AN ORGANIZED HEALTH CARE EDUCATION/TRAINING PROGRAM

## 2024-07-18 PROCEDURE — 2500000001 HC RX 250 WO HCPCS SELF ADMINISTERED DRUGS (ALT 637 FOR MEDICARE OP): Performed by: NURSE PRACTITIONER

## 2024-07-18 PROCEDURE — C1726 CATH, BAL DIL, NON-VASCULAR: HCPCS

## 2024-07-18 PROCEDURE — 99233 SBSQ HOSP IP/OBS HIGH 50: CPT

## 2024-07-18 PROCEDURE — 43264 ERCP REMOVE DUCT CALCULI: CPT | Performed by: STUDENT IN AN ORGANIZED HEALTH CARE EDUCATION/TRAINING PROGRAM

## 2024-07-18 PROCEDURE — C1769 GUIDE WIRE: HCPCS

## 2024-07-18 RX ORDER — LIDOCAINE HYDROCHLORIDE 20 MG/ML
INJECTION, SOLUTION INFILTRATION; PERINEURAL AS NEEDED
Status: DISCONTINUED | OUTPATIENT
Start: 2024-07-18 | End: 2024-07-18

## 2024-07-18 RX ORDER — ESMOLOL HYDROCHLORIDE 10 MG/ML
INJECTION INTRAVENOUS AS NEEDED
Status: DISCONTINUED | OUTPATIENT
Start: 2024-07-18 | End: 2024-07-18

## 2024-07-18 RX ORDER — INDOMETHACIN 50 MG/1
SUPPOSITORY RECTAL AS NEEDED
Status: COMPLETED | OUTPATIENT
Start: 2024-07-18 | End: 2024-07-18

## 2024-07-18 RX ORDER — MIDAZOLAM HYDROCHLORIDE 1 MG/ML
INJECTION INTRAMUSCULAR; INTRAVENOUS AS NEEDED
Status: DISCONTINUED | OUTPATIENT
Start: 2024-07-18 | End: 2024-07-18

## 2024-07-18 RX ORDER — ONDANSETRON HYDROCHLORIDE 2 MG/ML
4 INJECTION, SOLUTION INTRAVENOUS ONCE AS NEEDED
Status: DISCONTINUED | OUTPATIENT
Start: 2024-07-18 | End: 2024-07-20

## 2024-07-18 RX ORDER — ACETAMINOPHEN 325 MG/1
650 TABLET ORAL EVERY 4 HOURS PRN
Status: DISCONTINUED | OUTPATIENT
Start: 2024-07-18 | End: 2024-07-23 | Stop reason: HOSPADM

## 2024-07-18 RX ORDER — ROCURONIUM BROMIDE 10 MG/ML
INJECTION, SOLUTION INTRAVENOUS AS NEEDED
Status: DISCONTINUED | OUTPATIENT
Start: 2024-07-18 | End: 2024-07-18

## 2024-07-18 RX ORDER — PROPOFOL 10 MG/ML
INJECTION, EMULSION INTRAVENOUS AS NEEDED
Status: DISCONTINUED | OUTPATIENT
Start: 2024-07-18 | End: 2024-07-18

## 2024-07-18 RX ORDER — LIDOCAINE HYDROCHLORIDE 10 MG/ML
0.1 INJECTION INFILTRATION; PERINEURAL ONCE
Status: DISCONTINUED | OUTPATIENT
Start: 2024-07-18 | End: 2024-07-23 | Stop reason: HOSPADM

## 2024-07-18 RX ORDER — FENTANYL CITRATE 50 UG/ML
INJECTION, SOLUTION INTRAMUSCULAR; INTRAVENOUS AS NEEDED
Status: DISCONTINUED | OUTPATIENT
Start: 2024-07-18 | End: 2024-07-18

## 2024-07-18 RX ORDER — SODIUM CHLORIDE, SODIUM LACTATE, POTASSIUM CHLORIDE, CALCIUM CHLORIDE 600; 310; 30; 20 MG/100ML; MG/100ML; MG/100ML; MG/100ML
100 INJECTION, SOLUTION INTRAVENOUS CONTINUOUS
Status: DISCONTINUED | OUTPATIENT
Start: 2024-07-18 | End: 2024-07-18

## 2024-07-18 SDOH — ECONOMIC STABILITY: INCOME INSECURITY: HOW HARD IS IT FOR YOU TO PAY FOR THE VERY BASICS LIKE FOOD, HOUSING, MEDICAL CARE, AND HEATING?: NOT VERY HARD

## 2024-07-18 SDOH — ECONOMIC STABILITY: HOUSING INSECURITY: AT ANY TIME IN THE PAST 12 MONTHS, WERE YOU HOMELESS OR LIVING IN A SHELTER (INCLUDING NOW)?: NO

## 2024-07-18 SDOH — ECONOMIC STABILITY: HOUSING INSECURITY: IN THE PAST 12 MONTHS, HOW MANY TIMES HAVE YOU MOVED WHERE YOU WERE LIVING?: 1

## 2024-07-18 SDOH — ECONOMIC STABILITY: TRANSPORTATION INSECURITY
IN THE PAST 12 MONTHS, HAS LACK OF TRANSPORTATION KEPT YOU FROM MEETINGS, WORK, OR FROM GETTING THINGS NEEDED FOR DAILY LIVING?: YES

## 2024-07-18 SDOH — ECONOMIC STABILITY: INCOME INSECURITY: IN THE LAST 12 MONTHS, WAS THERE A TIME WHEN YOU WERE NOT ABLE TO PAY THE MORTGAGE OR RENT ON TIME?: NO

## 2024-07-18 ASSESSMENT — PAIN DESCRIPTION - LOCATION: LOCATION: CHEST

## 2024-07-18 ASSESSMENT — COGNITIVE AND FUNCTIONAL STATUS - GENERAL
MOBILITY SCORE: 24
MOBILITY SCORE: 24
DAILY ACTIVITIY SCORE: 24
DAILY ACTIVITIY SCORE: 24

## 2024-07-18 ASSESSMENT — PAIN SCALES - GENERAL
PAINLEVEL_OUTOF10: 0 - NO PAIN
PAINLEVEL_OUTOF10: 0 - NO PAIN
PAIN_LEVEL: 0
PAINLEVEL_OUTOF10: 8
PAINLEVEL_OUTOF10: 8
PAINLEVEL_OUTOF10: 7
PAINLEVEL_OUTOF10: 9
PAINLEVEL_OUTOF10: 0 - NO PAIN
PAINLEVEL_OUTOF10: 0 - NO PAIN

## 2024-07-18 ASSESSMENT — PAIN - FUNCTIONAL ASSESSMENT
PAIN_FUNCTIONAL_ASSESSMENT: 0-10
PAIN_FUNCTIONAL_ASSESSMENT: UNABLE TO SELF-REPORT
PAIN_FUNCTIONAL_ASSESSMENT: 0-10
PAIN_FUNCTIONAL_ASSESSMENT: 0-10

## 2024-07-18 NOTE — ANESTHESIA PROCEDURE NOTES
Airway  Date/Time: 7/18/2024 2:46 PM  Urgency: elective    Airway not difficult    Staffing  Performed: CRNA   Authorized by: INA Mayorga    Performed by: INA Mayorga  Patient location during procedure: OR    Indications and Patient Condition  Indications for airway management: anesthesia  Spontaneous ventilation: present  Sedation level: deep  Preoxygenated: yes  Patient position: sniffing  Mask difficulty assessment: 1 - vent by mask    Final Airway Details  Final airway type: endotracheal airway      Successful airway: ETT  Cuffed: yes   Successful intubation technique: direct laryngoscopy  Endotracheal tube insertion site: oral  Blade: Angel  Blade size: #4  ETT size (mm): 7.5  Cormack-Lehane Classification: grade I - full view of glottis  Placement verified by: chest auscultation and capnometry   Measured from: lips  ETT to lips (cm): 22

## 2024-07-18 NOTE — CARE PLAN
The patient's goals for the shift include Get at least 6-8 hrs of sleep/rest during shift.    The clinical goals for the shift include pt will rate pain 6/10 or less this shift    Over the shift, pt remained safe. Medicated prn for pain.  Gave 1unit of blood last night. Tolerated well. Safety measures implemented, call light within reach and bed at lowest position

## 2024-07-18 NOTE — PROGRESS NOTES
"Joellen Narayan is a 23 y.o. male on day 10 of admission presenting with Sickle cell crisis (Multi).    Subjective   NAEON. Denies N/V/D/C, chest pain, cough, shortness of breath.  Waiting for ERCP, otherwise feels ok. Pain meds bring his pain from a 9 to a 6. We discussed an extra dose prior to going down for ERCP.        Objective     Physical Exam  Constitutional:       General: He is not in acute distress.     Appearance: He is not toxic-appearing.   HENT:      Head: Normocephalic and atraumatic.   Eyes:      General: Scleral icterus present.      Extraocular Movements: Extraocular movements intact.   Cardiovascular:      Rate and Rhythm: Normal rate and regular rhythm.   Pulmonary:      Effort: No respiratory distress.   Abdominal:      General: Abdomen is flat.      Palpations: Abdomen is soft.      Tenderness: There is no abdominal tenderness.   Musculoskeletal:         General: No swelling or tenderness.   Skin:     Coloration: Skin is not jaundiced.      Findings: No bruising or erythema.   Neurological:      Mental Status: He is oriented to person, place, and time. Mental status is at baseline.         Last Recorded Vitals  Blood pressure 112/56, pulse 60, temperature 36.6 °C (97.9 °F), temperature source Temporal, resp. rate 18, height 1.88 m (6' 2\"), weight 72.6 kg (160 lb), SpO2 98%.  Intake/Output last 3 Shifts:  I/O last 3 completed shifts:  In: 2536.3 (34.9 mL/kg) [P.O.:480; I.V.:1093.8 (15.1 mL/kg); Blood:862.5; IV Piggyback:100]  Out: 1950 (26.9 mL/kg) [Urine:1950 (0.7 mL/kg/hr)]  Weight: 72.6 kg         Assessment/Plan   Principal Problem:    Sickle cell crisis (Multi)  Active Problems:    Choledocholithiasis    Joellen Narayan is a 23 y.o. 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 7/8 Troponin negative, EKG NSR with PACs, no T wave inversion. CXR (7/8) without evidence of acute process. Lysis labs near pt's baseline, however as of 7/10 slightly uptrending. Started on IV dilaudid for pain management (7/8- current). 7/11 Repeat CXR unchanged from prior. LFTs began to uptrend 7/12. 7/14 RUQ U/S showing cholelithiasis without cholecystitis, increased hepatic parenchymal echogenicity 2/2 mild diffuse fatty infiltration versus hepatocellular disease. Given acute increase followed by slow decline, original ddx likely r/t transient choledocholithiasis however 7/16 Tbili spiked 37.8, MRCP ordered. 7/16 Heme consulted for c/f sickle hepatopathy, rec no urgent need for exchange yet, start IVF, agree with MRCP, and plan to exchange him 7/17 if no other cause of liver damage is apparent. Exchange labs results pending 7/16. MRCP 7/17 showing interval development of mild intrahepatic and extrahepatic biliary ductal dilation with the common bile duct, filling defect at the ampulla of Vater c/w choledocholithiasis. GI consulted 7/17 and s/p ERCP on 7/18. MRCP read c/f acute renal artery infarct, labs and exam WNL, no need for CTA at this time per attending. DC home resumed O2 pending improvement in pain, GI recs.       # transaminitis  # hyperbilirubinemia  - Baseline tbili ~ 6-7  - Likely reactive 2/2 crisis vs c/f developing sickle hepatopathy vs choledocholithiasis vs obstructive cholestasis from tumor    - on admit , ALT 28, AST 69, tbili 7.7 --> (7/10) increased 139/47/115/bili 11.6 --> (7/12) 156/89/128/t bili 23.2 --> (7/16) 185/92/115  - Pt denies RUQ pain, no fever/chills noted (7/10-current), 7/16 some nausea reported however pt does have some  intermittent nausea when on IV opioids however not different than his baseline when hospitalized   - Direct tbili 3.3 (7/10) --> 8.8 (7/12) --> 18.4 (7/16)   - GGT (7/14) 159  - TOMAS negative 7/17   - s/p D51/2NS @ 75 (7/10--7/11)  - 7/14 RUQ U/S showing cholelithiasis without cholecystitis, increased hepatic parenchymal echogenicity 2/2 mild diffuse fatty infiltration versus hepatocellular disease  - 7/13 Hepatitis panel negative, 7/15 CMV negative  - given acute increase followed by slow decline, original thought was likely r/t transient choledocholithiasis  - pt to follow up with Dr Stoll and Dr Cruz outpatient for continued monitoring once discharged    - 7/16 Tbili dramatically increased 37.8, CT A/P ordered however switched to MRCP after d/w radiology  - 7/16 Hematology consulted c/f sickle hepatopathy and possible need for RBCex, no urgent RBCex yet, rec hydration, repeat Hg S, agree with MRCP tomorrow, TOMAS, exchange labs, and plan to exchange him tomorrow if no other cause of liver damage is apparent   - Pre-exchange labs pending, started 1/2NS (7/16-current)  - If spikes fever or worsening abd pain/n/v, start Unasyn and consult GI - pt continues afebrile, no leukocytosis, and hemodynamically stable   - 7/17 MRCP showing interval development of mild intrahepatic and extrahepatic biliary ductal dilation with the common bile duct, filling defect at the ampulla of Vater c/w choledocholithiasis  - GI consulted 7/17 and s/p ERCP 7/18 for biliary sphincterotomy and sludge sweep  - f/u further GI recs   - continue unasyn for empiric coverage (7/17--   )    #? Acute renal infarct  - MRCP 7/17 showing diffusion restricting hypoenhancing lesion of the right kidney, favored to represent acute renal infarct given patient's history; rec follow-up with CT or MRI.  - denies hematuria, abdominal or flank pain   - 7/18  BUN/Cr= 5/0.53  - repeat UA negative for blood   - STAT CTA A/P not needed per attending 7/18       # Hgb  SS with acute on chronic pain  - OARRS reviewed, no aberrancies  - No current care path  - Most recent urgent RBC exchange Feb 2024 for Acute Chest Syndrome  - Hgb baseline ~ 7-8, Hgb 9.9 (7/8)--> Hgb 7.5 (7/10) ->6.9 (7/12) --> 7.1 (7/17); will discuss plan w/ Dr. Cruz for poss simple transfusion in setting of hematocrit < 20% and possible upcoming RBCex   - LDH baseline fluctuates but ~500,  (7/16)  - Mild leukocytosis noted on admission consistent with previous admissions. WBC 12.5k (7/8)--> improved to 10.8 (7/17); likely reactive as pt afebrile with no s/sx of infectious process   - Pt with CP and SOB, however these are all consistent with pt's baseline presentation of sickle cell crisis. In the absence of imaging findings c/f infection or ACS, no wheezing, and no fevers-- low c/f ACS at this time, however will closely monitor  - CXR (7/8) without evidence of acute process  - 7/11 overnight repeat CXR unchanged from prior   - Troponin (7/8): 12  - EKG (7/8): NSR with PACs, no T wave inversion,   - On admit started IV dilaudid 3mg q2hrs PRN severe pain (7/8- 7/10), increased to 3.5mg IVP in afternoon d/t unrelieved pain (7/10-7/11), pt declining PCA  - 7/11 Increased to 4mg dilaudid q2 PRN severe pain and then weaned to q3h on (7/14-7/17), spaced dilaudid to q4 PRN 7/17   - S/p IV Toradol 30mg q6hrs x3 days (7/8-7/11) with Protonix for PPI prophy  - Supportive care: lidocaine patch, voltaren gel x4/day, hot packs   - Continue home folic acid 1mg daily  - Utox (7/8): prelim + cannabinoid  - Hgb S (7/8) 74.2%, 7/16 Hg S 71.5%   - PO Zofran and compazine PRN for opioid-induced nausea, PO Benadryl PRN for opioid-induced pruritus, bowel regimen for opioid-induced constipation with DocuSenna 2tabs BID and Miralax daily (LBM 7/10)  - 7/16 Exchange labs sent and HELLEN negative     # Nodular lymphocyte predominant Hodgkins lymphoma (NLPHL)   - Primary Oncologist: Dr. Stoll - updated by attending 7/15   -  Enlarged lymph nodes noted 4/1/22  - RUQ US (11/14/22) with mildly enlarged LNs in the region of the kavin hepatis  - MRI liver (11/16/22) showed re-demonstration of bulky retroperitoneal lymphadenopathy and kavin hepatic lymphadenopathy    - (11/18/22) lymph node biopsy showed atypical lymphoid infiltrate. Reviewed by Hemepath board, insufficient for lymphoma diagnosis  - PET/CT (12/6/22) showing retroperitoneal lymphadenopathy  - Followed up with Dr. Stoll (12/16/22) with plan for surg/onc consult for large tissue bx but patient missed apt and was lost to follow up  - Requested new apt with Dr. Ronnie Marte 6/19/23, patient was not seen and lost to follow up  - CT a/p (11/28/23) increased size of retroperitoneal lymph nodes reflecting extramedullary hematopoiesis I/s/o sickle cell vs lymphoma  - Paraaortic LN bx via IR (11/30/23) consistent with NLPHL. Flow: no clonal B cell or T cell population identified, lymphocyte 95%, CD3+CD4+ 68%, CD3+CD8+ 7%, CD19+ 24%  - Elevated LDH/bili partially from sickle cell disease   - Chemotherapy (R-CHOP) was discussed with primary oncologist Dr. Stoll, and decision was made to simplify his chemotherapy to Rituxan and prednisone q3 weeks mainly due to frequent sickle cell crisis  - Current chemo regimen: rituxan and prednisone q3 weeks (C1 1/18/24, C2 2/8/24, Missed C3 d/t sickle cell pain crisis, C4 4/4/24, C5 5/16/24, C6 6/7/24)  - Per pt no longer taking Acyclovir 400mg BID prophy   - planned for re-staging PET CT 7/15. Will reschedule as outpatient (Dr Stoll notified)     # Hx Priapism  - Last episode 6/18; pt presented to the ED with Priapism. Urology consulted at that time and unable to aspirate blood, however rec no acute further interventions necessary as there was improvement with conservative management  - No active issues with priapism currently  - Can order Sudafed q4h PRN if reoccurs   - Pt missed urology FUV 7/2     # Hx of nocturnal oxygen dependence and hypoxia  on room air  - Historically improves with oxygen supplementation  - Pt hypoxic on admission (SpO2 84% on RA while asleep), placed on 2L supp O2    - CXR (7/8) without evidence of acute process; pt additionally denies SOB  - Has not had home oxygen for 2-3 years   - Pt did not qualify for home O2 per walking pulse ox performed during previous hospital admission on 2/26/24  - Wean as able, encourage incentive spirometry  - 6/2024 failed x1 walking pulse ox, 6/25 pt was on RA however failed walking pulse ox and was sent home on 2L continuous O2   - Pulm FUV 8/5     # DVT prophy: Lovenox subcutaneous, SCDs, encourage ambulation     # dispo   - Full code, confirmed on admit  - DC home resumed O2 pending improvement in pain, labs, and Heme recs   - NOK: Melissa Narayan (Parent) [phone]  - FUV PET/CT 7/15 (will need rescheduled), Dr. Stoll 7/25, Pulm 8/8, Sickle Cell FUV requested/pending       I spent >60 minutes in the professional and overall care of this patient.      Mary Moody, APRN-CNP

## 2024-07-18 NOTE — ANESTHESIA POSTPROCEDURE EVALUATION
Patient: Joellen Narayan    Procedure Summary       Date: 07/18/24 Room / Location: [facility]    Anesthesia Start: 1432 Anesthesia Stop: 1538    Procedure: ERCP Diagnosis: Choledocholithiasis    Scheduled Providers: Dell North MD; Tricia Cardoza RN Responsible Provider: Precious Valladares MD    Anesthesia Type: general ASA Status: 2            Anesthesia Type: general    Vitals Value Taken Time   /65 07/18/24 1530   Temp 36.4 °C (97.5 °F) 07/18/24 1530   Pulse 87 07/18/24 1530   Resp 14 07/18/24 1530   SpO2 99 % 07/18/24 1530       Anesthesia Post Evaluation    Patient location during evaluation: PACU  Patient participation: complete - patient participated  Level of consciousness: sleepy but conscious  Pain score: 0  Pain management: adequate  Multimodal analgesia pain management approach  Airway patency: patent  Two or more strategies used to mitigate risk of obstructive sleep apnea  Cardiovascular status: acceptable  Respiratory status: acceptable and face mask  Hydration status: euvolemic  Postoperative Nausea and Vomiting: none        No notable events documented.
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
## 2024-07-19 LAB
ALBUMIN SERPL BCP-MCNC: 3.8 G/DL (ref 3.4–5)
ALP SERPL-CCNC: 167 U/L (ref 33–120)
ALT SERPL W P-5'-P-CCNC: 67 U/L (ref 10–52)
ANION GAP SERPL CALC-SCNC: 12 MMOL/L (ref 10–20)
AST SERPL W P-5'-P-CCNC: 84 U/L (ref 9–39)
BASOPHILS # BLD AUTO: 0.03 X10*3/UL (ref 0–0.1)
BASOPHILS NFR BLD AUTO: 0.4 %
BILIRUB SERPL-MCNC: 47.4 MG/DL (ref 0–1.2)
BLOOD EXPIRATION DATE: NORMAL
BUN SERPL-MCNC: 5 MG/DL (ref 6–23)
CALCIUM SERPL-MCNC: 9 MG/DL (ref 8.6–10.6)
CHLORIDE SERPL-SCNC: 103 MMOL/L (ref 98–107)
CO2 SERPL-SCNC: 27 MMOL/L (ref 21–32)
CREAT SERPL-MCNC: 0.6 MG/DL (ref 0.5–1.3)
DISPENSE STATUS: NORMAL
EGFRCR SERPLBLD CKD-EPI 2021: >90 ML/MIN/1.73M*2
EOSINOPHIL # BLD AUTO: 0.47 X10*3/UL (ref 0–0.7)
EOSINOPHIL NFR BLD AUTO: 6.9 %
ERYTHROCYTE [DISTWIDTH] IN BLOOD BY AUTOMATED COUNT: 18.1 % (ref 11.5–14.5)
GLUCOSE SERPL-MCNC: 80 MG/DL (ref 74–99)
HCT VFR BLD AUTO: 24.9 % (ref 41–52)
HGB BLD-MCNC: 9.2 G/DL (ref 13.5–17.5)
HGB RETIC QN: 30 PG (ref 28–38)
HOLD SPECIMEN: NORMAL
IMM GRANULOCYTES # BLD AUTO: 0.11 X10*3/UL (ref 0–0.7)
IMM GRANULOCYTES NFR BLD AUTO: 1.6 % (ref 0–0.9)
IMMATURE RETIC FRACTION: 16.6 %
LDH SERPL L TO P-CCNC: 472 U/L (ref 84–246)
LYMPHOCYTES # BLD AUTO: 1.33 X10*3/UL (ref 1.2–4.8)
LYMPHOCYTES NFR BLD AUTO: 19.4 %
MCH RBC QN AUTO: 30.3 PG (ref 26–34)
MCHC RBC AUTO-ENTMCNC: 36.9 G/DL (ref 32–36)
MCV RBC AUTO: 82 FL (ref 80–100)
MONOCYTES # BLD AUTO: 1.3 X10*3/UL (ref 0.1–1)
MONOCYTES NFR BLD AUTO: 19 %
NEUTROPHILS # BLD AUTO: 3.62 X10*3/UL (ref 1.2–7.7)
NEUTROPHILS NFR BLD AUTO: 52.7 %
NRBC BLD-RTO: 4.5 /100 WBCS (ref 0–0)
PLATELET # BLD AUTO: 202 X10*3/UL (ref 150–450)
POTASSIUM SERPL-SCNC: 3.5 MMOL/L (ref 3.5–5.3)
PRODUCT BLOOD TYPE: 7300
PRODUCT CODE: NORMAL
PROT SERPL-MCNC: 5.2 G/DL (ref 6.4–8.2)
RBC # BLD AUTO: 3.04 X10*6/UL (ref 4.5–5.9)
RETICS #: 0.49 X10*6/UL (ref 0.02–0.12)
RETICS/RBC NFR AUTO: 16.2 % (ref 0.5–2)
SODIUM SERPL-SCNC: 138 MMOL/L (ref 136–145)
UNIT ABO: NORMAL
UNIT NUMBER: NORMAL
UNIT RH: NORMAL
UNIT VOLUME: 350
WBC # BLD AUTO: 6.9 X10*3/UL (ref 4.4–11.3)
XM INTEP: NORMAL

## 2024-07-19 PROCEDURE — 36415 COLL VENOUS BLD VENIPUNCTURE: CPT | Performed by: NURSE PRACTITIONER

## 2024-07-19 PROCEDURE — 2500000001 HC RX 250 WO HCPCS SELF ADMINISTERED DRUGS (ALT 637 FOR MEDICARE OP): Performed by: NURSE PRACTITIONER

## 2024-07-19 PROCEDURE — 99233 SBSQ HOSP IP/OBS HIGH 50: CPT

## 2024-07-19 PROCEDURE — 85045 AUTOMATED RETICULOCYTE COUNT: CPT | Performed by: NURSE PRACTITIONER

## 2024-07-19 PROCEDURE — 2500000004 HC RX 250 GENERAL PHARMACY W/ HCPCS (ALT 636 FOR OP/ED): Performed by: NURSE PRACTITIONER

## 2024-07-19 PROCEDURE — 85025 COMPLETE CBC W/AUTO DIFF WBC: CPT | Performed by: NURSE PRACTITIONER

## 2024-07-19 PROCEDURE — 83615 LACTATE (LD) (LDH) ENZYME: CPT | Performed by: NURSE PRACTITIONER

## 2024-07-19 PROCEDURE — 1170000001 HC PRIVATE ONCOLOGY ROOM DAILY

## 2024-07-19 PROCEDURE — 2500000004 HC RX 250 GENERAL PHARMACY W/ HCPCS (ALT 636 FOR OP/ED)

## 2024-07-19 PROCEDURE — 99232 SBSQ HOSP IP/OBS MODERATE 35: CPT | Performed by: STUDENT IN AN ORGANIZED HEALTH CARE EDUCATION/TRAINING PROGRAM

## 2024-07-19 PROCEDURE — 2500000002 HC RX 250 W HCPCS SELF ADMINISTERED DRUGS (ALT 637 FOR MEDICARE OP, ALT 636 FOR OP/ED)

## 2024-07-19 PROCEDURE — 84075 ASSAY ALKALINE PHOSPHATASE: CPT | Performed by: NURSE PRACTITIONER

## 2024-07-19 RX ORDER — POTASSIUM CHLORIDE 20 MEQ/1
20 TABLET, EXTENDED RELEASE ORAL ONCE
Status: COMPLETED | OUTPATIENT
Start: 2024-07-19 | End: 2024-07-19

## 2024-07-19 ASSESSMENT — COGNITIVE AND FUNCTIONAL STATUS - GENERAL
MOBILITY SCORE: 24
DAILY ACTIVITIY SCORE: 24
DAILY ACTIVITIY SCORE: 24
MOBILITY SCORE: 24

## 2024-07-19 ASSESSMENT — PAIN SCALES - GENERAL
PAINLEVEL_OUTOF10: 9
PAINLEVEL_OUTOF10: 7
PAINLEVEL_OUTOF10: 6
PAINLEVEL_OUTOF10: 7
PAINLEVEL_OUTOF10: 8
PAINLEVEL_OUTOF10: 8
PAINLEVEL_OUTOF10: 7
PAINLEVEL_OUTOF10: 6
PAINLEVEL_OUTOF10: 9
PAINLEVEL_OUTOF10: 6
PAINLEVEL_OUTOF10: 9
PAINLEVEL_OUTOF10: 8
PAINLEVEL_OUTOF10: 8

## 2024-07-19 ASSESSMENT — PAIN SCALES - PAIN ASSESSMENT IN ADVANCED DEMENTIA (PAINAD): TOTALSCORE: MEDICATION (SEE MAR)

## 2024-07-19 ASSESSMENT — PAIN DESCRIPTION - LOCATION
LOCATION: ABDOMEN

## 2024-07-19 ASSESSMENT — PAIN DESCRIPTION - DESCRIPTORS: DESCRIPTORS: CRAMPING
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
## 2024-07-19 NOTE — CARE PLAN
Problem: Fall/Injury  Goal: Not fall by end of shift  Outcome: Progressing  Goal: Be free from injury by end of the shift  Outcome: Progressing  Goal: Verbalize understanding of personal risk factors for fall in the hospital  Outcome: Progressing  Goal: Verbalize understanding of risk factor reduction measures to prevent injury from fall in the home  Outcome: Progressing  Goal: Use assistive devices by end of the shift  Outcome: Progressing  Goal: Pace activities to prevent fatigue by end of the shift  Outcome: Progressing     Problem: Pain  Goal: Takes deep breaths with improved pain control throughout the shift  Outcome: Progressing  Goal: Turns in bed with improved pain control throughout the shift  Outcome: Progressing  Goal: Walks with improved pain control throughout the shift  Outcome: Progressing  Goal: Performs ADL's with improved pain control throughout shift  Outcome: Progressing  Goal: Participates in PT with improved pain control throughout the shift  Outcome: Progressing  Goal: Free from opioid side effects throughout the shift  Outcome: Progressing  Goal: Free from acute confusion related to pain meds throughout the shift  Outcome: Progressing     Problem: Pain - Adult  Goal: Verbalizes/displays adequate comfort level or baseline comfort level  Outcome: Progressing     Problem: Safety - Adult  Goal: Free from fall injury  Outcome: Progressing     Problem: Discharge Planning  Goal: Discharge to home or other facility with appropriate resources  Outcome: Progressing     Problem: Chronic Conditions and Co-morbidities  Goal: Patient's chronic conditions and co-morbidity symptoms are monitored and maintained or improved  Outcome: Progressing   The patient's goals for the shift include Get at least 6-8 hrs of sleep/rest during shift.    The clinical goals for the shift include Pt will remain safe in room and use call ligjht during shift on 7/19/24 @ [id number]

## 2024-07-19 NOTE — PROGRESS NOTES
Gastroenterology Consult Service Progress Note  Department of Gastroenterology & Hepatology  [facility]    [facility]  July 19, 2024   Patient: Joellen Narayan    Medical Record: [id number]  Reason for Consult: elevated LFTs, concern for choledocholithiasis   Requesting Service: Logan/ ivonneonc    Interval History: s/p ERCP yesterday with evidence of filling defects, S/p sphincterotomy. Multiple stones and sludge removed.  Today, Tbili has dropped slightly from 52.8 --> 47.4. Diet has been advanced and patient is tolerating well.     Physical Exam:    Vitals:    07/19/24 0154 07/19/24 0620 07/19/24 1001 07/19/24 1410   BP: 123/60 124/64 117/56 136/67   BP Location: Left arm Left arm Left arm Left arm   Patient Position: Lying Lying Lying Sitting   Pulse: 60 62 56 70   Resp: 16 18 16 15   Temp: 36.5 °C (97.7 °F) 36.6 °C (97.9 °F) 36.8 °C (98.2 °F) 36.5 °C (97.7 °F)   TempSrc: Temporal Temporal Temporal Temporal   SpO2: 96% 96% 92% 96%   Weight:       Height:             Intake/Output Summary (Last 24 hours) at 7/19/2024 1647  Last data filed at 7/19/2024 1414  Gross per 24 hour   Intake 3281.25 ml   Output 1975 ml   Net 1306.25 ml     Gen: chronically ill appearing, A+Ox3, in no acute distress  HEENT: PEERL, EOMI, sclera icteric, no conjunctival injection, MMM  Resp: CTAB, on 2L NC, normal breath sounds  CV: RRR, normal S1/S2  GI: non-tender, non-distended, normal BSs in all 4 quadrants, no rebound or guarding  MSK: warm and well perfused, no edema  Neuro: A+Ox3, no focal deficits  Skin: warm and dry, no lesions, no rashes     Labs:  Lab Results   Component Value Date    GLUCOSE 80 07/19/2024    CALCIUM 9.0 07/19/2024     07/19/2024    K 3.5 07/19/2024    CO2 27 07/19/2024     07/19/2024    BUN 5 (L) 07/19/2024    CREATININE 0.60 07/19/2024     Lab Results   Component Value Date    WBC 6.9 07/19/2024    HGB 9.2 (L) 07/19/2024    HCT 24.9 (L) 07/19/2024     MCV 82 07/19/2024     07/19/2024     Lab Results   Component Value Date    ALT 67 (H) 07/19/2024    AST 84 (H) 07/19/2024     (H) 07/14/2024    ALKPHOS 167 (H) 07/19/2024    BILITOT 47.4 (H) 07/19/2024     Lab Results   Component Value Date    INR 1.2 (H) 07/16/2024    INR 1.4 (H) 06/18/2024    INR 1.5 (H) 02/17/2024    PROTIME 13.6 (H) 07/16/2024    PROTIME 15.7 (H) 06/18/2024    PROTIME 17.2 (H) 02/17/2024         Imaging:  RUQ US with doppler 7/14:     - Impression -  1. Hepatomegaly with slightly increased hepatic parenchymal  echogenicity, which may be secondary to mild diffuse fatty  infiltration versus hepatocellular disease.  2. Patent hepatic vasculature.  3. Cholelithiasis without sonographic evidence of acute cholecystitis.  4. Prominent peripancreatic and perihepatic lymph nodes, which were  also noted on 06/10/2024 CT..        MRI/MRCP w/wo 7/17:  MR MRCP WITH PANCREAS WO AND W CONTRAST (Preliminary)  This result has not been signed. Information might be incomplete.     - Impression -  1.  Interval development of mild intrahepatic and extrahepatic  biliary ductal dilation with the common bile duct measuring up to 1.0  cm. Filling defect measuring 0.3 cm at the ampulla of Vater is  compatible with choledocholithiasis. Gastroenterological or surgical  consultation is recommended.  2. Diffusion restricting hypoenhancing lesion within the superior  pole of the right kidney measuring 1.0 cm is favored to represent and  acute renal infarct given patient's history; however, given  nonvisualization on arterial phase imaging consider follow-up with CT  or MRI.  3. Interval development of hepatic iron overload. Additional sequela  of sickle cell disease as described above.  4. Cholelithiasis without evidence of cholecystitis.     GI procedures:   ERCP 7/18:  Findings  The major papilla was native. The major papilla had a normal appearance. The common bile duct was deeply cannulated after 1 attempt  "using a traction sphincterotome with 450 cm x 0.035\" straight guidewire. Cannulation was not difficult and no bleeding was observed. An initial contrast injection was performed successfully in the common bile duct. The injection extended throughout the biliary tree except the cystic duct. Ductal flow was adequate. Additional filling defects were observed. The study's image quality was acceptable. Filling defect consistent with sludge was visualized in the distal common bile duct. Complete 10 mm biliary sphincterotomy was performed using a sphincterotome. No bleeding was noted at the procedure site. Multiple sweeps were performed in the common bile duct using a 8 mm balloon. Sludge and stones were removed, achieving complete clearance. Bile was noted to be draining at the end of the procedure. The pancreatic duct was not cannulated.     Assessment and Plan:        Joellen Narayan is a 23 y.o. male with Sickle cell disease (complicated by acute chest in feb 2024), Hodgkin's lymphoma (nodular lymphocyte predominant, on retux/steroids), initially admitted with acute on choric sickle cell pain.  Gastroenterology is consulted for up trending LFTs and concern for choledocholithiasis.      Patient has chronically elevated Bili, typically indirect hyperbilirubinemia 2/2 hemolysis. This admission, however, as well as once in 2022, pt has evidence of worsening indirect hyperbilirubinemia. 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 Has never had liver biopsy. Degree of this current hyperbilirubinemia likely reflects additional biliary obstruction. Concern for choledocholithiasis based on MRCP. Pt underwent ERCP On 7/18 with multiple stones and sludge, however stones were very small. Pt is s/p sphincterotomy with stone and sludge removal. There was no evidence of any extrinsic compression on " cholangiogram. We are concerned with this degree of bili elevation is likely not only 2/2 biliary obstruction. Will recommend workup for chronic liver disease as below. This was reviewed with liver transplant attending, Dr. Gomez. We have already ruled out Budd chiari with imaging form earlier this week. If there is no downtrend of bili to near normal for patient (10-15) would recommend liver biopsy to rule out VOD, vanishing duct syndrome, etc.     CLD workup: HBsAb negative (7/13/2024), HBsAg NR (6/24/2024), HBcAb total 7/13/2024. HBcAb IgM NR (7/14/2024), HA Ab IGM NR (7/14/2024), HCV Ab NR 7/13/2024. HIV NR 2022. TOMAS negative (7/17/2024), ASMA (11/16/2022), A1AT phenotype M1M1 11/16/2022. Ceruloplasmin 38 (11/16/2022). Last set of iron labs from 2021.      #mixed marked hyperbilirubinemia:  #choledocholithiasis, s/p sphincterotomy   - please check D bili along with CMP daily  - please repeat iron panel and ferritin   - appreciate  surgical service for CCY   - Okay to advance diet to regular  - if patient does not have downtrend of Bili closer to baseline (10-15) over weekend, would recommend proceeding with IR guided liver biopsy (would recommend TJ in order to get portal pressures)    Patient seen and discussed with attending, Dr. Lopez.     Ping Reno, GI fellow    Thank you for the consultation. Gastroenterology will continue to the follow the patient.   Please do not hesitate to contact me on DocHalo or page [phone] if there are any further questions between the weekday hours of 7 AM - 5 PM.   If there is an urgent concern during the weekend, after-hours, or holidays; then please page the [place] [phone]. Thank you.    SIGNATURE: Ping Reno MD PATIENT NAME: Joellen Narayan   DATE: July 19, 2024 MRN: [id number]

## 2024-07-19 NOTE — CARE PLAN
The clinical goals for the shift include Patient will remain free of falls, HDS, and rate pain </= 8/10 throughout shift.    VSS, afebrile, no complaints N/V/D this shift. Pt remained safe and free of falls/injury. Patient c/o increased pain to abdomen overnight 2/2 ERCP yesterday. Medicated w/ PRN medications with some relief.

## 2024-07-19 NOTE — PROGRESS NOTES
Gastroenterology Consult Service Progress Note  Department of Gastroenterology & Hepatology  [facility]    [facility]  July 18, 2024   Patient: Joellen Narayan    Medical Record: [id number]  Reason for Consult: elevated LFTs, concern for choledocholithiasis   Requesting Service: Logan/ ivonneonc    Interval History: s/p ERCP today with evidence of filling defects, S/p sphincterotomy. Multiple stones and sludge removed.      Physical Exam:    Vitals:    07/18/24 1600 07/18/24 1615 07/18/24 1630 07/18/24 1703   BP: 121/68 119/63 120/70 132/73   BP Location:    Left arm   Patient Position:       Pulse: 64 61 65 60   Resp: 14 16 16 16   Temp:    37.1 °C (98.8 °F)   TempSrc:    Temporal   SpO2: 95% 94% 94% 98%   Weight:       Height:             Intake/Output Summary (Last 24 hours) at 7/18/2024 2038  Last data filed at 7/18/2024 1800  Gross per 24 hour   Intake 900 ml   Output 2650 ml   Net -1750 ml     Gen: chronically ill appearing, A+Ox3, in no acute distress  HEENT: PEERL, EOMI, sclera icteric, no conjunctival injection, MMM  Resp: CTAB, on 2L NC, normal breath sounds  CV: RRR, normal S1/S2  GI: non-tender, non-distended, normal BSs in all 4 quadrants, no rebound or guarding  MSK: warm and well perfused, no edema  Neuro: A+Ox3, no focal deficits  Skin: warm and dry, no lesions, no rashes     Labs:  Lab Results   Component Value Date    GLUCOSE 74 07/18/2024    CALCIUM 9.5 07/18/2024     07/18/2024    K 3.7 07/18/2024    CO2 26 07/18/2024     07/18/2024    BUN 5 (L) 07/18/2024    CREATININE 0.53 07/18/2024     Lab Results   Component Value Date    WBC 9.3 07/18/2024    HGB 10.1 (L) 07/18/2024    HCT 27.7 (L) 07/18/2024    MCV 83 07/18/2024     07/18/2024     Lab Results   Component Value Date    ALT 74 (H) 07/18/2024    AST 99 (H) 07/18/2024     (H) 07/14/2024    ALKPHOS 173 (H) 07/18/2024    BILITOT 52.8 (H) 07/18/2024     Lab Results  "  Component Value Date    INR 1.2 (H) 07/16/2024    INR 1.4 (H) 06/18/2024    INR 1.5 (H) 02/17/2024    PROTIME 13.6 (H) 07/16/2024    PROTIME 15.7 (H) 06/18/2024    PROTIME 17.2 (H) 02/17/2024         Imaging:    RUQ US with doppler 7/14:     - Impression -  1. Hepatomegaly with slightly increased hepatic parenchymal  echogenicity, which may be secondary to mild diffuse fatty  infiltration versus hepatocellular disease.  2. Patent hepatic vasculature.  3. Cholelithiasis without sonographic evidence of acute cholecystitis.  4. Prominent peripancreatic and perihepatic lymph nodes, which were  also noted on 06/10/2024 CT..        MRI/MRCP w/wo 7/17:  MR MRCP WITH PANCREAS WO AND W CONTRAST (Preliminary)  This result has not been signed. Information might be incomplete.     - Impression -  1.  Interval development of mild intrahepatic and extrahepatic  biliary ductal dilation with the common bile duct measuring up to 1.0  cm. Filling defect measuring 0.3 cm at the ampulla of Vater is  compatible with choledocholithiasis. Gastroenterological or surgical  consultation is recommended.  2. Diffusion restricting hypoenhancing lesion within the superior  pole of the right kidney measuring 1.0 cm is favored to represent and  acute renal infarct given patient's history; however, given  nonvisualization on arterial phase imaging consider follow-up with CT  or MRI.  3. Interval development of hepatic iron overload. Additional sequela  of sickle cell disease as described above.  4. Cholelithiasis without evidence of cholecystitis.     GI procedures:   ERCP 7/18:  Findings  The major papilla was native. The major papilla had a normal appearance. The common bile duct was deeply cannulated after 1 attempt using a traction sphincterotome with 450 cm x 0.035\" straight guidewire. Cannulation was not difficult and no bleeding was observed. An initial contrast injection was performed successfully in the common bile duct. The injection " extended throughout the biliary tree except the cystic duct. Ductal flow was adequate. Additional filling defects were observed. The study's image quality was acceptable. Filling defect consistent with sludge was visualized in the distal common bile duct. Complete 10 mm biliary sphincterotomy was performed using a sphincterotome. No bleeding was noted at the procedure site. Multiple sweeps were performed in the common bile duct using a 8 mm balloon. Sludge and stones were removed, achieving complete clearance. Bile was noted to be draining at the end of the procedure. The pancreatic duct was not cannulated.     Assessment and Plan:        Joellen Narayan is a 23 y.o. male with Sickle cell disease (complicated by acute chest in feb 2024), Hodgkin's lymphoma (nodular lymphocyte predominant, on retux/steroids), initially admitted with acute on choric sickle cell pain.  Gastroenterology is consulted for up trending LFTs and concern for choledocholithiasis.      Patient has chronically elevated Bili, typically indirect hyperbilirubinemia 2/2 hemolysis. This admission, however, as well as once in 2022, pt has evidence of worsening indirect hyperbilirubinemia. 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 Has never had liver biopsy. Degree of this current hyperbilirubinemia likely reflects additional biliary obstruction. Concern for choledocholithiasis based on MRCP, however also w/ concern for extrinsic compression 2/2 portal hepatis LAD as seen on imaging. Reassuringly, pt is HDS and afebrile without leukocytosis.     Pt underwent ERCP On 7/18 with multiple stones and sludge, however stones were very small. Pt is s/p sphincterotomy with stone and sludge removal. There was no evidence of any extrinsic compression on cholangiogram.      #mixed hyperbilirubinemia:  #choledocholithiasis, s/p  sphincterotomy   - consult surgical service for CCY   - Okay to advance diet to regular  - Monitor LFTs daily (monitor for downtrend, suspect that there is a component of hemolysis and/or underlying liver dysfunction/sickle hepatopathy)  - If patient develops worsening abdominal pain, nausea, vomiting, or other symptoms or signs of pancreatitis, consider post-ERCP pancreatitis    Patient seen and discussed with attending, Dr. Owen.     Ping Reno, GI fellow    Thank you for the consultation. Gastroenterology will continue to the follow the patient.   Please do not hesitate to contact me on Doc[employer] or page [phone] if there are any further questions between the weekday hours of 7 AM - 5 PM.   If there is an urgent concern during the weekend, after-hours, or holidays; then please page the [place] GI fellow at [phone]. Thank you.    SIGNATURE: Ping Reno MD PATIENT NAME: Joellen Narayan   DATE: July 18, 2024 MRN: [id number]

## 2024-07-19 NOTE — PROGRESS NOTES
"Joellen Narayan is a 23 y.o. male on day 11 of admission presenting with Sickle cell crisis (Multi).    Subjective   Pt seen at bedside this AM with mother. Looks improved from 2 days ago. Reports has some abdominal pain since procedure yesterday but not sharp or rigid, belly tender and soft. He denies nausea, vomiting, fever, chills, headaches, itchiness, or bleeding overnight. We discussed awaiting AM labs to monitor LFTs.        Objective     Physical Exam  HENT:      Head: Normocephalic and atraumatic.      Right Ear: External ear normal.      Left Ear: External ear normal.      Nose: Nose normal.   Eyes:      General: Scleral icterus present.   Cardiovascular:      Rate and Rhythm: Normal rate and regular rhythm.   Pulmonary:      Effort: Pulmonary effort is normal.      Breath sounds: Normal breath sounds.   Abdominal:      General: Bowel sounds are normal.      Palpations: Abdomen is soft.   Musculoskeletal:         General: Normal range of motion.      Cervical back: Normal range of motion and neck supple.   Skin:     General: Skin is warm and dry.   Neurological:      General: No focal deficit present.      Mental Status: He is alert and oriented to person, place, and time. Mental status is at baseline.   Psychiatric:         Mood and Affect: Mood normal.         Behavior: Behavior normal.         Thought Content: Thought content normal.         Last Recorded Vitals  Blood pressure 124/64, pulse 62, temperature 36.6 °C (97.9 °F), temperature source Temporal, resp. rate 18, height 1.88 m (6' 2\"), weight 72.6 kg (160 lb), SpO2 96%.  Intake/Output last 3 Shifts:  I/O last 3 completed shifts:  In: 1250 (17.2 mL/kg) [Blood:850; IV Piggyback:400]  Out: 2650 (36.5 mL/kg) [Urine:2650 (1 mL/kg/hr)]  Weight: 72.6 kg     Relevant Results     .Scheduled medications  ampicillin-sulbactam, 3 g, intravenous, q6h  diclofenac sodium, 4 g, Topical, 4x daily  [Held by provider] enoxaparin, 40 mg, subcutaneous, q24h  folic " acid, 1 mg, oral, Daily  lidocaine, 0.1 mL, subcutaneous, Once  lidocaine, 1 patch, transdermal, Daily  pantoprazole, 40 mg, oral, Daily before breakfast  polyethylene glycol, 17 g, oral, Daily  sennosides-docusate sodium, 2 tablet, oral, BID      Continuous medications  sodium chloride, 75 mL/hr, Last Rate: 75 mL/hr (07/19/24 2139)      PRN medications  PRN medications: acetaminophen, diphenhydrAMINE, HYDROmorphone, lidocaine, naproxen, ondansetron, ondansetron, oxygen, oxygen, promethazine (Phenergan) 6.25 mg in sodium chloride 0.9% 50 mL IV    .  Results for orders placed or performed during the hospital encounter of 07/08/24 (from the past 24 hour(s))   CBC and Auto Differential   Result Value Ref Range    WBC 9.3 4.4 - 11.3 x10*3/uL    nRBC 6.5 (H) 0.0 - 0.0 /100 WBCs    RBC 3.33 (L) 4.50 - 5.90 x10*6/uL    Hemoglobin 10.1 (L) 13.5 - 17.5 g/dL    Hematocrit 27.7 (L) 41.0 - 52.0 %    MCV 83 80 - 100 fL    MCH 30.3 26.0 - 34.0 pg    MCHC 36.5 (H) 32.0 - 36.0 g/dL    RDW 18.5 (H) 11.5 - 14.5 %    Platelets 206 150 - 450 x10*3/uL    Neutrophils % 73.5 40.0 - 80.0 %    Immature Granulocytes %, Automated 1.5 (H) 0.0 - 0.9 %    Lymphocytes % 8.6 13.0 - 44.0 %    Monocytes % 13.8 2.0 - 10.0 %    Eosinophils % 2.2 0.0 - 6.0 %    Basophils % 0.4 0.0 - 2.0 %    Neutrophils Absolute 6.82 1.20 - 7.70 x10*3/uL    Immature Granulocytes Absolute, Automated 0.14 0.00 - 0.70 x10*3/uL    Lymphocytes Absolute 0.80 (L) 1.20 - 4.80 x10*3/uL    Monocytes Absolute 1.28 (H) 0.10 - 1.00 x10*3/uL    Eosinophils Absolute 0.20 0.00 - 0.70 x10*3/uL    Basophils Absolute 0.04 0.00 - 0.10 x10*3/uL   Lactate Dehydrogenase   Result Value Ref Range     (H) 84 - 246 U/L   Reticulocytes   Result Value Ref Range    Retic % 20.2 (H) 0.5 - 2.0 %    Retic Absolute 0.673 (H) 0.022 - 0.118 x10*6/uL    Reticulocyte Hemoglobin 32 28 - 38 pg    Immature Retic fraction 25.7 (H) <=16.0 %   Comprehensive Metabolic Panel   Result Value Ref Range     Glucose 74 74 - 99 mg/dL    Sodium 136 136 - 145 mmol/L    Potassium 3.7 3.5 - 5.3 mmol/L    Chloride 100 98 - 107 mmol/L    Bicarbonate 26 21 - 32 mmol/L    Anion Gap 14 10 - 20 mmol/L    Urea Nitrogen 5 (L) 6 - 23 mg/dL    Creatinine 0.53 0.50 - 1.30 mg/dL    eGFR >90 >60 mL/min/1.73m*2    Calcium 9.5 8.6 - 10.6 mg/dL    Albumin 4.1 3.4 - 5.0 g/dL    Alkaline Phosphatase 173 (H) 33 - 120 U/L    Total Protein 5.7 (L) 6.4 - 8.2 g/dL    AST 99 (H) 9 - 39 U/L    Bilirubin, Total 52.8 (H) 0.0 - 1.2 mg/dL    ALT 74 (H) 10 - 52 U/L   Urinalysis with Reflex Microscopic   Result Value Ref Range    Color, Urine Dark-Yellow Light-Yellow, Yellow, Dark-Yellow    Appearance, Urine Turbid (N) Clear    Specific Gravity, Urine 1.010 1.005 - 1.035    pH, Urine 6.5 5.0, 5.5, 6.0, 6.5, 7.0, 7.5, 8.0    Protein, Urine NEGATIVE NEGATIVE, 10 (TRACE), 20 (TRACE) mg/dL    Glucose, Urine Normal Normal mg/dL    Blood, Urine NEGATIVE NEGATIVE    Ketones, Urine NEGATIVE NEGATIVE mg/dL    Bilirubin, Urine OVER (4+) (A) NEGATIVE    Urobilinogen, Urine 6 (2+) (A) Normal mg/dL    Nitrite, Urine NEGATIVE NEGATIVE    Leukocyte Esterase, Urine NEGATIVE NEGATIVE   Urine Gray Tube   Result Value Ref Range    Extra Tube Hold for add-ons.          Assessment/Plan   Principal Problem:    Sickle cell crisis (Multi)  Active Problems:    Choledocholithiasis    Joellen Narayan is a 23 y.o. 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 7/8 Troponin negative, EKG NSR with PACs, no T wave inversion. CXR (7/8) without  evidence of acute process. Started on IV dilaudid for pain management (7/8- current). 7/11 Repeat CXR unchanged from prior. Hemolysis labs near pt's baseline on admit, however as of 7/12 LFTs uptrended significantly. 7/14 RUQ U/S showing cholelithiasis without cholecystitis, increased hepatic parenchymal echogenicity 2/2 mild diffuse fatty infiltration versus hepatocellular disease. Given acute increase followed by slow decline, original ddx likely r/t transient choledocholithiasis however 7/16 Tbili spiked 37.8, MRCP ordered. 7/16 Heme consulted for c/f sickle hepatopathy, rec no urgent need for exchange yet, start IVF, agree with MRCP, and plan to exchange him if no other cause of liver damage is apparent. 7/17 MRCP showing interval development of mild intrahepatic and extrahepatic biliary ductal dilation with the common bile duct, filling defect at the ampulla of Vater c/w choledocholithiasis. (7/17-7/19) s/p Unasyn for empiric coverage. 7/17 GI consulted, s/p ERCP 7/18 with multiple sweeps in CBD sludge and stones removed achieving complete clearance. 7/17 s/p x2u pRBC per Dr. Cruz to optimize for upcoming procedure vs poss RBCex. MRCP read c/f acute renal artery infarct, labs and physical exam WNL, no need for CTA at this time per primary attending. 7/19 ACS consulted for eval of inpatient CCY, recs pending. DC home resumed O2 pending improvement in pain, LFTs, and final Heme/ACS/GI recs.      # transaminitis  # hyperbilirubinemia  - Baseline tbili ~ 6-7  - Likely reactive 2/2 crisis vs c/f developing sickle hepatopathy vs choledocholithiasis vs obstructive cholestasis from tumor    - on admit , ALT 28, AST 69, tbili 7.7 --> (7/10) increased 139/47/115/bili 11.6 --> (7/12) 156/89/128/t bili 23.2 --> (7/16) 185/92/115  - Pt denies RUQ pain, no fever/chills noted (7/10-current), 7/16 some nausea reported however pt does have some intermittent nausea when on IV opioids however not different than his  baseline when hospitalized   - Direct tbili 3.3 (7/10) --> 8.8 (7/12) --> 18.4 (7/16) --> 22.8 (7/19)   - GGT (7/14) 159  - TOMAS negative 7/17   - s/p D51/2NS @ 75 (7/10--7/11)  - 7/14 RUQ U/S showing cholelithiasis without cholecystitis, increased hepatic parenchymal echogenicity 2/2 mild diffuse fatty infiltration versus hepatocellular disease  - 7/13 Hepatitis panel negative, 7/15 CMV negative  - given acute increase followed by slow decline, original thought was likely r/t transient choledocholithiasis  - pt to follow up with Dr Stoll and Dr Cruz outpatient for continued monitoring once discharged    - 7/16 Tbili dramatically increased 37.8, CT A/P ordered however switched to MRCP after d/w radiology  - 7/16 Hematology consulted c/f sickle hepatopathy and possible need for RBCex, no urgent RBCex yet, rec hydration, repeat Hg S, agree with MRCP tomorrow, TOMAS, exchange labs, and plan to exchange him tomorrow if no other cause of liver damage is apparent   - 7/17 Per Heme, no plans for RBCex at this time, cont IVF and monitor labs  - Pre-exchange labs pending, started 1/2NS (7/16-current)  - If spikes fever or worsening abd pain/n/v, start Unasyn and consult GI - pt continues afebrile, no leukocytosis, and hemodynamically stable   - 7/17 MRCP showing interval development of mild intrahepatic and extrahepatic biliary ductal dilation with the common bile duct, filling defect at the ampulla of Vater c/w choledocholithiasis  - 7/17 GI consulted, s/p ERCP 7/18 for biliary sphincterotomy and sludge sweep and stones removal, rec monitor for downtrend of LFTs, monitor for post-ERCP pancreatitis, and rec consult surgical services for cholecystomy   - 7/19 ACS consulted for eval for inpatient CCY, recs pending   - s/p Unasyn for empiric coverage (7/17-7/19) per attending      # ? Acute renal infarct  - MRCP 7/17 showing diffusion restricting hypoenhancing lesion of the right kidney, favored to represent acute renal infarct  given patient's history; rec follow-up with CT or MRI.  - denies hematuria, abdominal, or flank pain - physical exam benign   - 7/18  BUN/Cr= 5/0.53; no KEVIN noted throughout stay   - Repeat UA negative for blood   - STAT CTA A/P was ordered but deemed not needed per primary attending 7/18       # Hgb SS with acute on chronic pain  - OARRS reviewed, no aberrancies  - No current care path  - Most recent urgent RBC exchange Feb 2024 for Acute Chest Syndrome  - Hgb baseline ~ 7-8, Hgb 9.9 (7/8)--> Hgb 7.5 (7/10) ->6.9 (7/12) --> 7.1 (7/17)  - 7/17 s/p x2u PRBC per Dr. Cruz for ERCP procedure --> (7/19) Hg 10.1   - LDH baseline fluctuates but ~500,  (7/16)  - Mild leukocytosis noted on admission consistent with previous admissions. WBC 12.5k (7/8)--> improved to 10.8 (7/17); likely reactive as pt afebrile with no s/sx of infectious process   - Pt with CP and SOB, however these are all consistent with pt's baseline presentation of sickle cell crisis. In the absence of imaging findings c/f infection or ACS, no wheezing, and no fevers-- low c/f ACS at this time, however will closely monitor  - CXR (7/8) without evidence of acute process  - 7/11 overnight repeat CXR unchanged from prior   - Troponin (7/8): 12  - EKG (7/8): NSR with PACs, no T wave inversion,   - On admit started IV dilaudid 3mg q2hrs PRN severe pain (7/8- 7/10), increased to 3.5mg IVP in afternoon d/t unrelieved pain (7/10-7/11), pt declining PCA  - 7/11 Increased to 4mg dilaudid q2 PRN severe pain and then weaned to q3h on (7/14-7/17), spaced dilaudid to q4 PRN (7/17-current)   - S/p IV Toradol 30mg q6hrs x3 days (7/8-7/11) with Protonix for PPI prophy  - Supportive care: lidocaine patch, voltaren gel x4/day, hot packs   - Continue home folic acid 1mg daily  - Utox (7/8): prelim + cannabinoid  - Hgb S (7/8) 74.2%, 7/16 Hg S 71.5%   - PO Zofran and compazine PRN for opioid-induced nausea, PO Benadryl PRN for opioid-induced pruritus, bowel  regimen for opioid-induced constipation with DocuSenna 2tabs BID and Miralax daily (LBM 7/18)  - 7/16 Exchange labs sent/resulted and HELLEN negative     # Nodular lymphocyte predominant Hodgkins lymphoma (NLPHL)   - Primary Oncologist: Dr. Stoll - saw pt at bedside 7/17   - Enlarged lymph nodes noted 4/1/22  - RUQ US (11/14/22) with mildly enlarged LNs in the region of the kavin hepatis  - MRI liver (11/16/22) showed re-demonstration of bulky retroperitoneal lymphadenopathy and kavin hepatic lymphadenopathy    - (11/18/22) lymph node biopsy showed atypical lymphoid infiltrate. Reviewed by Hemepath board, insufficient for lymphoma diagnosis  - PET/CT (12/6/22) showing retroperitoneal lymphadenopathy  - Followed up with Dr. Stoll (12/16/22) with plan for surg/onc consult for large tissue bx but patient missed apt and was lost to follow up  - Requested new apt with Dr. Ronnie Marte 6/19/23, patient was not seen and lost to follow up  - CT A/P (11/28/23) increased size of retroperitoneal lymph nodes reflecting extramedullary hematopoiesis I/s/o sickle cell vs lymphoma  - Paraaortic LN bx via IR (11/30/23) consistent with NLPHL. Flow: no clonal B cell or T cell population identified, lymphocyte 95%, CD3+CD4+ 68%, CD3+CD8+ 7%, CD19+ 24%  - Elevated LDH/bili partially from sickle cell disease   - Chemotherapy (R-CHOP) was discussed with primary oncologist Dr. Stoll, and decision was made to simplify his chemotherapy to Rituxan and prednisone q3 weeks mainly due to frequent sickle cell crisis  - Current chemo regimen: rituxan and prednisone q3 weeks (C1 1/18/24, C2 2/8/24, Missed C3 d/t sickle cell pain crisis, C4 4/4/24, C5 5/16/24, C6 6/7/24)  - Per pt no longer taking Acyclovir 400mg BID prophy   - Planned for re-staging PET CT 7/15. Will reschedule as outpatient (Dr Stoll notified)     # Hx Priapism  - Last episode 6/18; pt presented to the ED with Priapism. Urology consulted at that time and unable to aspirate  blood, however rec no acute further interventions necessary as there was improvement with conservative management  - No active issues with priapism currently  - Can order Sudafed q4h PRN if reoccurs   - Pt missed urology FUV 7/2     # Hx of nocturnal oxygen dependence and hypoxia on room air  - Historically improves with oxygen supplementation  - Pt hypoxic on admission (SpO2 84% on RA while asleep), placed on 2L supp O2    - CXR (7/8) without evidence of acute process; pt additionally denies SOB  - Has not had home oxygen for 2-3 years   - Pt did not qualify for home O2 per walking pulse ox performed during previous hospital admission on 2/26/24  - Wean as able, encourage incentive spirometry  - 6/2024 failed x1 walking pulse ox, 6/25 pt was on RA however failed walking pulse ox and was sent home on 2L continuous O2   - Pulm FUV 8/5     # DVT prophy  - Lovenox subcutaneous, SCDs, encourage ambulation     # dispo   - Full code, confirmed on admit  - DC home resumed O2 pending improvement in pain, LFTs, and final GI/Heme recs  - NOK: Melissa Narayan (mother) [phone] - updated bedside 7/19   - FUV PET/CT 7/15 (will need rescheduled), Dr. Stoll 7/25, Pulm 8/8, Sickle Cell FUV requested/pending        I spent >60 minutes in the professional and overall care of this patient.    Assessment and plan discussed with attending physician Dr. Correia.       Evelyne Matt, APRN-CNP

## 2024-07-19 NOTE — PROGRESS NOTES
"Joellen Narayan is a 23 y.o. male on day 11 of admission presenting with Sickle cell crisis (Multi).      Subjective   Patient seen today s/p ERCP. Complains of vague abdominal pain post-op but getting better. No acute events overnight.       Objective     Last Recorded Vitals  /56 (BP Location: Left arm, Patient Position: Lying)   Pulse 56   Temp 36.8 °C (98.2 °F) (Temporal)   Resp 16   Wt 72.6 kg (160 lb)   SpO2 92%   Intake/Output last 3 Shifts:    Intake/Output Summary (Last 24 hours) at 7/19/2024 1108  Last data filed at 7/19/2024 1050  Gross per 24 hour   Intake 3076.25 ml   Output 1600 ml   Net 1476.25 ml       Admission Weight  Weight: 72.6 kg (160 lb) (07/08/24 2305)    Daily Weight  07/18/24 : 72.6 kg (160 lb)    Image Results  Endoscopic Retrograde Cholangiopancreatography (ERCP)  Table formatting from the original result was not included.  Findings  The major papilla was native. The major papilla had a normal appearance.   The common bile duct was deeply cannulated after 1 attempt using a   traction sphincterotome with 450 cm x 0.035\" straight guidewire.   Cannulation was not difficult and no bleeding was observed. An initial   contrast injection was performed successfully in the common bile duct. The   injection extended throughout the biliary tree except the cystic duct.   Ductal flow was adequate. Additional filling defects were observed. The   study's image quality was acceptable. Filling defect consistent with   sludge was visualized in the distal common bile duct. Complete 10 mm   biliary sphincterotomy was performed using a sphincterotome. No bleeding   was noted at the procedure site. Multiple sweeps were performed in the   common bile duct using a 8 mm balloon. Sludge and stones were removed,   achieving complete clearance. Bile was noted to be draining at the end of   the procedure. The pancreatic duct was not cannulated.     Recommendation  Follow up with primary care physician, " Polly Arita MD.  Follow up with Deanna Correia MD - Inpatient Medicine Service.  Follow up with Ambrocio Owen MD and Ping Reno MD - Inpatient GI Consult   Service.  Follow up with surgical service for cholecystectomy.  Degree of elevation of hyperbilirubinemia not fully explained by findings   on ERCP - suspect hemolysis contributing to hyperbilirubinemia.   - The patient will be observed post-procedure, until all discharge   criteria are met.   - Return patient to hospital ma for ongoing care.   - Observe patient's clinical course following today's procedure with   therapeutic intervention.      Indication  Choledocholithiasis    Staff  Staff Role   Dell North MD Proceduralist   Will Ellis MD Fellow     Medications  See Anesthesia Record.    Preprocedure  A history and physical has been performed, and patient medication   allergies have been reviewed. The patient's tolerance of previous   anesthesia has been reviewed. The risks and benefits of the procedure and   the sedation options and risks were discussed with the patient. All   questions were answered and informed consent obtained.  Rectal Indomethacin was administered.    Details of the Procedure  The patient underwent general anesthesia, which was administered by an   anesthesia professional. The patient's blood pressure, heart rate, level   of consciousness, oxygen, respirations, ECG and ETCO2 were monitored   throughout the procedure. The scope was introduced through the mouth and   advanced to the second part of the duodenum. Clinical intention was   achieved. The patient's estimated blood loss was minimal (<5 mL). The   procedure was not difficult. The patient tolerated the procedure well.   There were no apparent adverse events.     Events  Procedure Events   Event Event Time   ENDO SCOPE IN TIME 7/18/2024  2:56 PM   ENDO SCOPE OUT TIME 7/18/2024  3:12 PM     Specimens  No specimens collected    Procedure Location  [facility]  [facility]  [address]  [facility] [address]  [phone]    Referring Provider  Ping Reno MD    Procedure Provider  Dell North MD  MRCP pancreas w and wo IV contrast  Narrative: Interpreted By:  Curtis Johnson,  and Lesly Bridges   STUDY:  MRCP PANCREAS W AND WO IV CONTRAST;  7/17/2024 10:46 am      INDICATION:  Signs/Symptoms:c/f sickle hepatopathy, worsening hyperbilirubinemia.      COMPARISON:  Ultrasound of the liver dated 07/14/2024. CT of the chest, abdomen,  and pelvis dated 06/19/24 and MRI of the liver dated 11/16/2022      ACCESSION NUMBER(S):  [id number]      ORDERING CLINICIAN:  LINDY ARCEO      TECHNIQUE:  MRI PANCREAS; Multiplanar magnetic resonance images of the abdomen  were obtained including the following sequences; T2-weighted SSFSE  with and without fat saturation, T1-weighted GRE in/opposed phase,  DWI, fat saturated 3D-T1w GRE pre and dynamically post contrast.  Radial thick slab T2w RARE MRCP and coronally reconstructed navigator  gated high resolution 3-D T2w RESTORE MRCP with MIP reconstruction  were also performed for MRCP.  14 ML of Dotarem was administered  intravenously without immediate complication.      FINDINGS:  LIVER:  The liver is enlarged measuring 20.3cm in largest craniocaudal  extension, similar compared to prior examination. The contour is  smooth.  The liver parenchyma demonstrates diffusely decreased signal  intensity on T2w and T1w in phase imaging suggestive of iron  overload, new compared to prior examination. No liver mass.      BILE DUCTS:  There is mild intrahepatic and extrahepatic biliary ductal dilation  with the common bile duct measuring up to 1.0 cm which is new  compared to most recent CT dated 06/18/2024. There is a questionable  T2 hypointense filling defect at the ampulla of Vater measuring 3 mm  as seen on series 14, image 33 and series 5, image 28.      GALLBLADDER:  The gallbladder is nondistended,  containing multiple gallstones  measuring to 1.0 cm, similar compared to prior examination. No  evidence of cholecystitis.      PANCREAS:  Normal signal intensity. Normal enhancement. No masses. The  pancreatic duct is normal.      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      ADRENAL GLANDS:  Within normal limits.      KIDNEYS:  There is a 1.0 cm T1 hypointense diffusion restricting lesion within  the superior pole of the right kidney as seen on series 37, image 39  which was not evident on prior examination; the lesion is not well  visualized on arterial phase imaging but demonstrates hypoenhancement  on subsequent postcontrast imaging. The kidneys are otherwise are  normal in size and demonstrate expected cortical enhancement. No  hydronephrosis. No renal masses      LYMPH NODES:  No lymphadenopathy.      ABDOMINAL VESSELS:  Aorta and the major abdominal arterial vessels demonstrate no gross  abnormality.  Superior mesenteric vein, splenic vein, and main, right  and left portal vein are patent. Hepatic veins are patent.  No  significant collaterals or esophageal varices are present.      BOWEL:  Within normal limits.      PERITONEUM/RETROPERITONEUM:  No ascites. There is bulky T1 hyperintense, T2 hyperintense,  enhancing soft tissue within the retroperitoneum for example seen on  series 14 image 46 which is significantly decreased in size compared  to prior examination and compatible with extramedullary hematopoiesis.      BONES AND LOWER THORAX:  Within normal limits.      Impression: 1.  Interval development of mild intrahepatic and extrahepatic  biliary ductal dilation with the common bile duct measuring up to 1.0  cm. A small nonenhancing filling defect measuring 0.3 cm at the  ampulla of Vater, compatible with choledocholithiasis.  Gastroenterological or surgical consultation is recommended.  2. Diffusion restricting hypoenhancing  lesion within the superior  pole of the right kidney measuring up to 1.0 cm is favored to  represent focal renal infarct given patient's history; however, given  suspected early arterial enhancement follow-up with CT or MRI is  recommended to rule out neoplasm.  3. Mild diffuse hepatic parenchymal iron overload. Additional sequela  of sickle cell disease as described above.  4. Cholelithiasis without evidence of acute cholecystitis.      I personally reviewed the images/study with Cyrus Grace MD (Radiology  Resident) and I agree with the findings as stated.      MACRO:  Critical Finding:  See findings. Notification was initiated on  7/17/2024 at 12:16 pm by  Cyrus Grace.  (**-YCF-**) Instructions:      Signed by: Curtis Johnson 7/18/2024 9:55 AM  Dictation workstation:   [id number]      Physical Exam        Relevant Results    Results from last 72 hours   Lab Units 07/19/24  0716 07/18/24  1014 07/17/24  0817 07/17/24  0813 07/16/24  1543   WBC AUTO x10*3/uL 6.9 9.3 10.8  --   --    HEMOGLOBIN g/dL 9.2* 10.1* 7.1*  --   --    HEMATOCRIT % 24.9* 27.7* 19.3*  --   --    PLATELETS AUTO x10*3/uL 202 206 195  --   --    INR   --   --   --   --  1.2*   SODIUM mmol/L 138 136  --  137  --    POTASSIUM mmol/L 3.5 3.7  --  3.8  --    CHLORIDE mmol/L 103 100  --  100  --    CO2 mmol/L 27 26  --  28  --    BUN mg/dL 5* 5*  --  5*  --    CREATININE mg/dL 0.60 0.53  --  0.60  --    GLUCOSE mg/dL 80 74  --  81  --    CALCIUM mg/dL 9.0 9.5  --  9.4  --    ALBUMIN g/dL 3.8 4.1  --  4.2  --    ALK PHOS U/L 167* 173*  --  175*  --    ALT U/L 67* 74*  --  82*  --    AST U/L 84* 99*  --  100*  --    BILIRUBIN TOTAL mg/dL 47.4* 52.8*  --  39.9*  --           Current Facility-Administered Medications:     acetaminophen (Tylenol) tablet 650 mg, 650 mg, oral, q4h PRN, Precious Valladares MD    diclofenac sodium (Voltaren) 1 % gel 4 g, 4 g, Topical, 4x daily, RAAD Tom-CNP, 4 g at 07/15/24 1969    diphenhydrAMINE (BENADryl) capsule 25  mg, 25 mg, oral, q6h PRN, RAAD Khoury-CNP    [Held by provider] enoxaparin (Lovenox) syringe 40 mg, 40 mg, subcutaneous, q24h, RAAD Khoury-CNP, 40 mg at 07/14/24 0824    folic acid (Folvite) tablet 1 mg, 1 mg, oral, Daily, RAVI Khoury, 1 mg at 07/19/24 0905    HYDROmorphone PF (Dilaudid) injection 4 mg, 4 mg, intravenous, q4h PRN, RAAD Tom-CNP, 4 mg at 07/19/24 0904    lidocaine (Xylocaine) 10 mg/mL (1 %) injection 0.1 mL, 0.1 mL, subcutaneous, Once, Precious Valladares MD    lidocaine (Xylocaine) 2 % mouth solution 1.25 mL, 1.25 mL, Swish & Spit, q6h PRN, RAVI Tom    lidocaine 4 % patch 1 patch, 1 patch, transdermal, Daily, RAAD Khoury-CNP, 1 patch at 07/14/24 0825    naproxen (Naprosyn) tablet 500 mg, 500 mg, oral, q12h PRN, RAVI Tom    ondansetron (Zofran) injection 4 mg, 4 mg, intravenous, q6h PRN, RAAD Tom-CNP, 4 mg at 07/17/24 2306    ondansetron (Zofran) injection 4 mg, 4 mg, intravenous, Once PRN, Precious Valladares MD    oxygen (O2) therapy, , inhalation, Continuous PRN - O2/gases, RAAD Khoury-CNP, 2 L/min at 07/16/24 0813    oxygen (O2) therapy, , inhalation, Continuous PRN - O2/gases, Precious Valladares MD    pantoprazole (ProtoNix) EC tablet 40 mg, 40 mg, oral, Daily before breakfast, RAAD Khoury-CNP, 40 mg at 07/19/24 0905    polyethylene glycol (Glycolax, Miralax) packet 17 g, 17 g, oral, Daily, RAVI Khoury, 17 g at 07/13/24 0900    promethazine (Phenergan) 6.25 mg in sodium chloride 0.9% 50 mL IV, 6.25 mg, intravenous, Once PRN, Precious Valladares MD    sennosides-docusate sodium (Promise-Colace) 8.6-50 mg per tablet 2 tablet, 2 tablet, oral, BID, RAVI Khoury, 2 tablet at 07/18/24 2012    sodium chloride 0.45 % infusion, 75 mL/hr, intravenous, Continuous, RAVI Tom, Last Rate: 75 mL/hr at 07/19/24 0415, 75 mL/hr at 07/19/24 0415       Assessment/Plan       Principal Problem:    Sickle cell crisis (Multi)  Active Problems:    Choledocholithiasis    Joellen Narayan is a 23 y.o. 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 Hematology was consulted for worsening hyperbilirubinemia.     Updates 7/19  :: T.bili is down to 47.4 (from 52.8 yesterday).  :: Had ERCP today; sludge and few stones were removed.  :: Planned for interval cholecystectomy    #Hyperbilirubinemia  :: Baseline Hb: ~7-8 (9.2 today).  :: Direct bilirubin 3.3 (7/10) --> 8.8 (7/12) --> 18.4 (7/16) --> 22.8 (7/17)  :: Total bilirubin 11.6 (7/10) --> 23.3 (7/12) --> 37.8 (7/16) --> 39.9(7/17)  ::  (7/10) --> 562 (7/12) --> 594 (7/16) --> 590 (7/17)  :: GGT (7/14) 159,  (7/10) --> 156 (7/12)--> 185 (7/16)  :: Coags (7/16): INR 1.2, PT 13.6, aPTT 28  :: 7/14 RUQ U/S showing cholelithiasis without cholecystitis, increased hepatic parenchymal echogenicity 2/2 mild diffuse fatty infiltration versus hepatocellular disease.  :: 7/13 Hepatitis panel negative, 7/15 CMV negative.  :: Medication review not positive for any known culprit.  :: Presentation consistent with hepatic vaso-occlusive crisis    Recommendations:  - We will defer to GI and ACS teams on his continued management, and follow up outpatient. Thank you for the opportunity to participate in his care on this admission, hematology team will sign off here.    Shahnaz Dsouza MD  PGY1 Internal Medicine Resident

## 2024-07-20 LAB
ABO GROUP (TYPE) IN BLOOD: NORMAL
ALBUMIN SERPL BCP-MCNC: 3.8 G/DL (ref 3.4–5)
ALP SERPL-CCNC: 162 U/L (ref 33–120)
ALT SERPL W P-5'-P-CCNC: 63 U/L (ref 10–52)
ANION GAP SERPL CALC-SCNC: 13 MMOL/L (ref 10–20)
ANTIBODY SCREEN: NORMAL
AST SERPL W P-5'-P-CCNC: 77 U/L (ref 9–39)
BASOPHILS # BLD AUTO: 0.04 X10*3/UL (ref 0–0.1)
BASOPHILS NFR BLD AUTO: 0.5 %
BILIRUB DIRECT SERPL-MCNC: 19.2 MG/DL (ref 0–0.3)
BILIRUB SERPL-MCNC: 35.6 MG/DL (ref 0–1.2)
BUN SERPL-MCNC: 7 MG/DL (ref 6–23)
CALCIUM SERPL-MCNC: 9 MG/DL (ref 8.6–10.6)
CHLORIDE SERPL-SCNC: 103 MMOL/L (ref 98–107)
CO2 SERPL-SCNC: 27 MMOL/L (ref 21–32)
CREAT SERPL-MCNC: 0.59 MG/DL (ref 0.5–1.3)
EGFRCR SERPLBLD CKD-EPI 2021: >90 ML/MIN/1.73M*2
EOSINOPHIL # BLD AUTO: 0.7 X10*3/UL (ref 0–0.7)
EOSINOPHIL NFR BLD AUTO: 8 %
ERYTHROCYTE [DISTWIDTH] IN BLOOD BY AUTOMATED COUNT: 17.6 % (ref 11.5–14.5)
FERRITIN SERPL-MCNC: 625 NG/ML (ref 20–300)
GLUCOSE SERPL-MCNC: 78 MG/DL (ref 74–99)
HCT VFR BLD AUTO: 25.1 % (ref 41–52)
HGB BLD-MCNC: 9.2 G/DL (ref 13.5–17.5)
HGB RETIC QN: 31 PG (ref 28–38)
IMM GRANULOCYTES # BLD AUTO: 0.09 X10*3/UL (ref 0–0.7)
IMM GRANULOCYTES NFR BLD AUTO: 1 % (ref 0–0.9)
IMMATURE RETIC FRACTION: 8.5 %
IRON SATN MFR SERPL: 56 % (ref 25–45)
IRON SERPL-MCNC: 183 UG/DL (ref 35–150)
LDH SERPL L TO P-CCNC: 459 U/L (ref 84–246)
LYMPHOCYTES # BLD AUTO: 1.65 X10*3/UL (ref 1.2–4.8)
LYMPHOCYTES NFR BLD AUTO: 18.9 %
MCH RBC QN AUTO: 29.9 PG (ref 26–34)
MCHC RBC AUTO-ENTMCNC: 36.7 G/DL (ref 32–36)
MCV RBC AUTO: 82 FL (ref 80–100)
MONOCYTES # BLD AUTO: 1.16 X10*3/UL (ref 0.1–1)
MONOCYTES NFR BLD AUTO: 13.3 %
NEUTROPHILS # BLD AUTO: 5.09 X10*3/UL (ref 1.2–7.7)
NEUTROPHILS NFR BLD AUTO: 58.3 %
NRBC BLD-RTO: 2.1 /100 WBCS (ref 0–0)
PLATELET # BLD AUTO: 178 X10*3/UL (ref 150–450)
POTASSIUM SERPL-SCNC: 3.8 MMOL/L (ref 3.5–5.3)
PROT SERPL-MCNC: 5.5 G/DL (ref 6.4–8.2)
RBC # BLD AUTO: 3.08 X10*6/UL (ref 4.5–5.9)
RETICS #: 0.38 X10*6/UL (ref 0.02–0.12)
RETICS/RBC NFR AUTO: 12.3 % (ref 0.5–2)
RH FACTOR (ANTIGEN D): NORMAL
SODIUM SERPL-SCNC: 139 MMOL/L (ref 136–145)
TIBC SERPL-MCNC: 329 UG/DL (ref 240–445)
UIBC SERPL-MCNC: 146 UG/DL (ref 110–370)
WBC # BLD AUTO: 8.7 X10*3/UL (ref 4.4–11.3)

## 2024-07-20 PROCEDURE — 83540 ASSAY OF IRON: CPT

## 2024-07-20 PROCEDURE — 2500000001 HC RX 250 WO HCPCS SELF ADMINISTERED DRUGS (ALT 637 FOR MEDICARE OP): Performed by: NURSE PRACTITIONER

## 2024-07-20 PROCEDURE — 80053 COMPREHEN METABOLIC PANEL: CPT | Performed by: NURSE PRACTITIONER

## 2024-07-20 PROCEDURE — 99232 SBSQ HOSP IP/OBS MODERATE 35: CPT | Performed by: STUDENT IN AN ORGANIZED HEALTH CARE EDUCATION/TRAINING PROGRAM

## 2024-07-20 PROCEDURE — 82248 BILIRUBIN DIRECT: CPT

## 2024-07-20 PROCEDURE — 2500000004 HC RX 250 GENERAL PHARMACY W/ HCPCS (ALT 636 FOR OP/ED): Performed by: NURSE PRACTITIONER

## 2024-07-20 PROCEDURE — 83615 LACTATE (LD) (LDH) ENZYME: CPT | Performed by: NURSE PRACTITIONER

## 2024-07-20 PROCEDURE — 85045 AUTOMATED RETICULOCYTE COUNT: CPT | Performed by: NURSE PRACTITIONER

## 2024-07-20 PROCEDURE — 2500000004 HC RX 250 GENERAL PHARMACY W/ HCPCS (ALT 636 FOR OP/ED)

## 2024-07-20 PROCEDURE — 86901 BLOOD TYPING SEROLOGIC RH(D): CPT | Performed by: NURSE PRACTITIONER

## 2024-07-20 PROCEDURE — 99233 SBSQ HOSP IP/OBS HIGH 50: CPT

## 2024-07-20 PROCEDURE — 1170000001 HC PRIVATE ONCOLOGY ROOM DAILY

## 2024-07-20 PROCEDURE — 82728 ASSAY OF FERRITIN: CPT

## 2024-07-20 PROCEDURE — 85025 COMPLETE CBC W/AUTO DIFF WBC: CPT | Performed by: NURSE PRACTITIONER

## 2024-07-20 ASSESSMENT — COGNITIVE AND FUNCTIONAL STATUS - GENERAL
MOBILITY SCORE: 24
MOBILITY SCORE: 24
DAILY ACTIVITIY SCORE: 24
DAILY ACTIVITIY SCORE: 24

## 2024-07-20 ASSESSMENT — PAIN DESCRIPTION - LOCATION
LOCATION: ABDOMEN
LOCATION: ABDOMEN

## 2024-07-20 ASSESSMENT — PAIN - FUNCTIONAL ASSESSMENT
PAIN_FUNCTIONAL_ASSESSMENT: 0-10

## 2024-07-20 ASSESSMENT — PAIN SCALES - GENERAL
PAINLEVEL_OUTOF10: 7
PAINLEVEL_OUTOF10: 9
PAINLEVEL_OUTOF10: 7
PAINLEVEL_OUTOF10: 8
PAINLEVEL_OUTOF10: 6

## 2024-07-20 NOTE — CARE PLAN
The patient's goals for the shift include Get at least 6-8 hrs of sleep/rest during shift.    The clinical goals for the shift include Pt will remain free of falls, HDS, and rate pain </= 7/10 throughout shit    Pt educated on need to turn and reposition every 2 hours to prevent skin breakdown.     Problem: Fall/Injury  Goal: Not fall by end of shift  Outcome: Progressing  Goal: Be free from injury by end of the shift  Outcome: Progressing  Goal: Verbalize understanding of personal risk factors for fall in the hospital  Outcome: Progressing  Goal: Verbalize understanding of risk factor reduction measures to prevent injury from fall in the home  Outcome: Progressing  Goal: Use assistive devices by end of the shift  Outcome: Progressing  Goal: Pace activities to prevent fatigue by end of the shift  Outcome: Progressing     Problem: Pain  Goal: Takes deep breaths with improved pain control throughout the shift  Outcome: Progressing  Goal: Turns in bed with improved pain control throughout the shift  Outcome: Progressing  Goal: Walks with improved pain control throughout the shift  Outcome: Progressing  Goal: Performs ADL's with improved pain control throughout shift  Outcome: Progressing  Goal: Participates in PT with improved pain control throughout the shift  Outcome: Progressing  Goal: Free from opioid side effects throughout the shift  Outcome: Progressing  Goal: Free from acute confusion related to pain meds throughout the shift  Outcome: Progressing

## 2024-07-20 NOTE — PROGRESS NOTES
"Joellen Narayan is a 23 y.o. male on day 12 of admission presenting with Sickle cell crisis (Multi).    Subjective   Pt seen this morning resting in bed. Patient reports pain around 7/10, but controlled. Denies any other complaints. Tolerating diet.        Objective     Physical Exam  HENT:      Head: Normocephalic and atraumatic.      Right Ear: External ear normal.      Left Ear: External ear normal.      Nose: Nose normal.   Eyes:      General: Scleral icterus present.   Cardiovascular:      Rate and Rhythm: Normal rate and regular rhythm.   Pulmonary:      Effort: Pulmonary effort is normal.      Breath sounds: Normal breath sounds.   Abdominal:      General: Bowel sounds are normal.      Palpations: Abdomen is soft.   Musculoskeletal:         General: Normal range of motion.      Cervical back: Normal range of motion and neck supple.   Skin:     General: Skin is warm and dry.   Neurological:      General: No focal deficit present.      Mental Status: He is alert and oriented to person, place, and time. Mental status is at baseline.   Psychiatric:         Mood and Affect: Mood normal.         Behavior: Behavior normal.         Thought Content: Thought content normal.         Last Recorded Vitals  Blood pressure 138/70, pulse 61, temperature 36.6 °C (97.9 °F), temperature source Temporal, resp. rate 16, height 1.88 m (6' 2\"), weight 72.6 kg (160 lb), SpO2 96%.  Intake/Output last 3 Shifts:  I/O last 3 completed shifts:  In: 5357.5 (73.8 mL/kg) [P.O.:1530; I.V.:3627.5 (50 mL/kg); IV Piggyback:200]  Out: 1975 (27.2 mL/kg) [Urine:1975 (0.8 mL/kg/hr)]  Weight: 72.6 kg     Relevant Results     .Scheduled medications  diclofenac sodium, 4 g, Topical, 4x daily  enoxaparin, 40 mg, subcutaneous, q24h  folic acid, 1 mg, oral, Daily  lidocaine, 0.1 mL, subcutaneous, Once  lidocaine, 1 patch, transdermal, Daily  pantoprazole, 40 mg, oral, Daily before breakfast  polyethylene glycol, 17 g, oral, Daily  sennosides-docusate " sodium, 2 tablet, oral, BID      Continuous medications     PRN medications  PRN medications: acetaminophen, diphenhydrAMINE, HYDROmorphone, lidocaine, naproxen, ondansetron, oxygen, oxygen    .  Results for orders placed or performed during the hospital encounter of 07/08/24 (from the past 24 hour(s))   CBC and Auto Differential   Result Value Ref Range    WBC 8.7 4.4 - 11.3 x10*3/uL    nRBC 2.1 (H) 0.0 - 0.0 /100 WBCs    RBC 3.08 (L) 4.50 - 5.90 x10*6/uL    Hemoglobin 9.2 (L) 13.5 - 17.5 g/dL    Hematocrit 25.1 (L) 41.0 - 52.0 %    MCV 82 80 - 100 fL    MCH 29.9 26.0 - 34.0 pg    MCHC 36.7 (H) 32.0 - 36.0 g/dL    RDW 17.6 (H) 11.5 - 14.5 %    Platelets 178 150 - 450 x10*3/uL    Neutrophils % 58.3 40.0 - 80.0 %    Immature Granulocytes %, Automated 1.0 (H) 0.0 - 0.9 %    Lymphocytes % 18.9 13.0 - 44.0 %    Monocytes % 13.3 2.0 - 10.0 %    Eosinophils % 8.0 0.0 - 6.0 %    Basophils % 0.5 0.0 - 2.0 %    Neutrophils Absolute 5.09 1.20 - 7.70 x10*3/uL    Immature Granulocytes Absolute, Automated 0.09 0.00 - 0.70 x10*3/uL    Lymphocytes Absolute 1.65 1.20 - 4.80 x10*3/uL    Monocytes Absolute 1.16 (H) 0.10 - 1.00 x10*3/uL    Eosinophils Absolute 0.70 0.00 - 0.70 x10*3/uL    Basophils Absolute 0.04 0.00 - 0.10 x10*3/uL   Lactate Dehydrogenase   Result Value Ref Range     (H) 84 - 246 U/L   Reticulocytes   Result Value Ref Range    Retic % 12.3 (H) 0.5 - 2.0 %    Retic Absolute 0.378 (H) 0.022 - 0.118 x10*6/uL    Reticulocyte Hemoglobin 31 28 - 38 pg    Immature Retic fraction 8.5 <=16.0 %   Comprehensive Metabolic Panel   Result Value Ref Range    Glucose 78 74 - 99 mg/dL    Sodium 139 136 - 145 mmol/L    Potassium 3.8 3.5 - 5.3 mmol/L    Chloride 103 98 - 107 mmol/L    Bicarbonate 27 21 - 32 mmol/L    Anion Gap 13 10 - 20 mmol/L    Urea Nitrogen 7 6 - 23 mg/dL    Creatinine 0.59 0.50 - 1.30 mg/dL    eGFR >90 >60 mL/min/1.73m*2    Calcium 9.0 8.6 - 10.6 mg/dL    Albumin 3.8 3.4 - 5.0 g/dL    Alkaline Phosphatase  162 (H) 33 - 120 U/L    Total Protein 5.5 (L) 6.4 - 8.2 g/dL    AST 77 (H) 9 - 39 U/L    Bilirubin, Total 35.6 (H) 0.0 - 1.2 mg/dL    ALT 63 (H) 10 - 52 U/L   Iron and TIBC   Result Value Ref Range    Iron 183 (H) 35 - 150 ug/dL    UIBC 146 110 - 370 ug/dL    TIBC 329 240 - 445 ug/dL    % Saturation 56 (H) 25 - 45 %   Ferritin   Result Value Ref Range    Ferritin 625 (H) 20 - 300 ng/mL   Bilirubin, Direct   Result Value Ref Range    Bilirubin, Direct 19.2 (H) 0.0 - 0.3 mg/dL   Type And Screen   Result Value Ref Range    ABO TYPE B     Rh TYPE POS     ANTIBODY SCREEN NEG          Assessment/Plan   Principal Problem:    Sickle cell crisis (Multi)  Active Problems:    Choledocholithiasis    Joellen Narayan is a 23 y.o. 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 7/8 Troponin negative, EKG NSR with PACs, no T wave inversion. CXR (7/8) without evidence of acute process. Started on IV dilaudid for pain management (7/8- current). 7/11 Repeat CXR unchanged from prior. Hemolysis labs near pt's baseline on admit, however as of 7/12 LFTs uptrended significantly. 7/14 RUQ U/S showing cholelithiasis without cholecystitis, increased hepatic parenchymal echogenicity 2/2 mild diffuse fatty infiltration versus hepatocellular disease. Given acute increase followed by slow decline, original ddx likely r/t transient choledocholithiasis however 7/16 Tbili spiked 37.8, MRCP ordered. 7/16 Heme consulted for c/f sickle hepatopathy, rec no urgent need for exchange yet, start IVF, agree with  MRCP. 7/17 MRCP showing interval development of mild intrahepatic and extrahepatic biliary ductal dilation with the common bile duct, filling defect at the ampulla of Vater c/w choledocholithiasis. (7/17-7/19) s/p Unasyn for empiric coverage. 7/17 GI consulted, s/p ERCP 7/18 with multiple sweeps in CBD sludge and stones removed achieving complete clearance. 7/17 s/p x2u pRBC per Dr. Cruz to optimize for upcoming procedure. MRCP read c/f acute renal artery infarct, labs and physical exam WNL, no need for CTA at this time per primary attending. 7/19 ACS consulted for eval of inpatient CCY, plan for possible cholecystectomy next week while inpatient.    Per GI; if bilirubin continues to downtrend- likely can plan for TJ biopsy however will need to discuss plan and risk/benefit     DC home resumed O2 pending improvement in pain, LFTs, and final ACS/GI recs     # transaminitis  # hyperbilirubinemia  - Baseline tbili ~ 6-7  - Likely reactive 2/2 crisis vs c/f developing sickle hepatopathy vs choledocholithiasis vs obstructive cholestasis from tumor    - 7/14 RUQ U/S showing cholelithiasis without cholecystitis, increased hepatic parenchymal echogenicity 2/2 mild diffuse fatty infiltration versus hepatocellular disease  - 7/13 Hepatitis panel negative, 7/15 CMV negative  - 7/16 Hematology consulted c/f sickle hepatopathy and possible need for RBCex, no urgent RBCex yet, rec hydration, repeat Hg S, agree with MRCP-->no plans for RBCex at this time, cont IVF and monitor labs; signed off 7/19  - s/p 1/2NS (7/16-7/20)  - s/p Unasyn for empiric coverage (7/17-7/19)   - 7/17 MRCP showing interval development of mild intrahepatic and extrahepatic biliary ductal dilation with the common bile duct, filling defect at the ampulla of Vater c/w choledocholithiasis  - 7/17 GI consulted, s/p ERCP 7/18 for biliary sphincterotomy and sludge sweep and stones removal, rec monitor for downtrend of LFTs, monitor for post-ERCP pancreatitis, and  rec consult surgical services for cholecystomy   - 7/19 ACS consulted for eval for inpatient CCY; consider internal cholecystectomy possibly early next week   - ongoing monitoring of bili and direct bili; all labs downtrending   - per GI possible plan for TJ biopsy if bili is around 10    # Hgb SS with acute on chronic pain  - OARRS reviewed, no aberrancies  - No current care path  - Most recent urgent RBC exchange Feb 2024 for Acute Chest Syndrome  - Hgb baseline ~ 7-8; s/p x2u PRBC per Dr. Cruz for ERCP procedure  - LDH baseline fluctuates but ~500  - Pt with CP and SOB, however these are all consistent with pt's baseline presentation of sickle cell crisis. In the absence of imaging findings c/f infection or ACS, no wheezing, and no fevers-- low c/f ACS at this time, however will closely monitor  - CXR (7/8) without evidence of acute process; 7/11 repeat CXR unchanged from prior   - Troponin (7/8): 12  - EKG (7/8): NSR with PACs, no T wave inversion,   - continue IVP dilaudid q4 PRN (7/17-current)   - S/p IV Toradol 30mg q6hrs x3 days (7/8-7/11) with Protonix for PPI prophy  - Supportive care: lidocaine patch, voltaren gel x4/day, hot packs   - Continue home folic acid 1mg daily  - Utox (7/8): prelim + cannabinoid  - Hgb S (7/8) 74.2%, 7/16 Hg S 71.5%   - PO Zofran and compazine PRN for opioid-induced nausea, PO Benadryl PRN for opioid-induced pruritus, bowel regimen for opioid-induced constipation with DocuSenna 2tabs BID and Miralax daily (LBM 7/18)  - 7/16 Exchange labs sent/resulted and HELLEN negative     # Nodular lymphocyte predominant Hodgkins lymphoma (NLPHL)   - Primary Oncologist: Dr. Stoll - saw pt at bedside 7/17   - Enlarged lymph nodes noted 4/1/22  - RUQ US (11/14/22) with mildly enlarged LNs in the region of the kavin hepatis  - MRI liver (11/16/22) showed re-demonstration of bulky retroperitoneal lymphadenopathy and kavin hepatic lymphadenopathy    - (11/18/22) lymph node biopsy showed  atypical lymphoid infiltrate. Reviewed by Hemepath board, insufficient for lymphoma diagnosis  - PET/CT (12/6/22) showing retroperitoneal lymphadenopathy  - Followed up with Dr. Stoll (12/16/22) with plan for surg/onc consult for large tissue bx but patient missed apt and was lost to follow up  - Requested new apt with Dr. Ronnie Marte 6/19/23, patient was not seen and lost to follow up  - CT A/P (11/28/23) increased size of retroperitoneal lymph nodes reflecting extramedullary hematopoiesis I/s/o sickle cell vs lymphoma  - Paraaortic LN bx via IR (11/30/23) consistent with NLPHL. Flow: no clonal B cell or T cell population identified, lymphocyte 95%, CD3+CD4+ 68%, CD3+CD8+ 7%, CD19+ 24%  - Elevated LDH/bili partially from sickle cell disease   - Chemotherapy (R-CHOP) was discussed with primary oncologist Dr. Stoll, and decision was made to simplify his chemotherapy to Rituxan and prednisone q3 weeks mainly due to frequent sickle cell crisis  - Current chemo regimen: rituxan and prednisone q3 weeks (C1 1/18/24, C2 2/8/24, Missed C3 d/t sickle cell pain crisis, C4 4/4/24, C5 5/16/24, C6 6/7/24)  - Per pt no longer taking Acyclovir 400mg BID prophy   - Planned for re-staging PET CT 7/15. Will reschedule as outpatient (Dr Stoll notified)     # Hx Priapism  - Last episode 6/18; pt presented to the ED with Priapism. Urology consulted at that time and unable to aspirate blood, however rec no acute further interventions necessary as there was improvement with conservative management  - No active issues with priapism currently  - Can order Sudafed q4h PRN if reoccurs      # Hx of nocturnal oxygen dependence and hypoxia on room air  - Historically improves with oxygen supplementation  - Pt hypoxic on admission (SpO2 84% on RA while asleep), placed on 2L supp O2    - CXR (7/8) without evidence of acute process; pt additionally denies SOB  - Wean as able, encourage incentive spirometry  - 6/2024 failed x1 walking pulse ox,  6/25 pt was on RA however failed walking pulse ox and was sent home on 2L continuous O2   - Pulm FUV 8/5     # DVT prophy  - Lovenox subcutaneous, SCDs, encourage ambulation     # dispo   - Full code, confirmed on admit  - DC home resumed O2 pending improvement in pain, LFTs, and final GI/Heme recs  - NOK: Melissa Narayan (mother) [phone] - updated bedside 7/19   - FUV Dr. Stoll 7/25, Pulm 8/8, Sickle Cell FUV requested/pending     I spent >60 minutes in the professional and overall care of this patient.    Assessment and plan discussed with attending physician Dr. Correia.       Sravanthi Dillon, APRN-CNP

## 2024-07-20 NOTE — CARE PLAN
The clinical goals for the shift include Pt will remain free of falls, HDS, and rate pain </= 7/10 throughout shit    VSS, afebrile, no complaints N/V/D this shift. Pt remained safe and free of falls/injury. Patient continued to have abdominal pain overnight and medicated with PRNs with some relief

## 2024-07-21 LAB
ALBUMIN SERPL BCP-MCNC: 3.9 G/DL (ref 3.4–5)
ALP SERPL-CCNC: 166 U/L (ref 33–120)
ALT SERPL W P-5'-P-CCNC: 64 U/L (ref 10–52)
ANION GAP SERPL CALC-SCNC: 13 MMOL/L (ref 10–20)
AST SERPL W P-5'-P-CCNC: 84 U/L (ref 9–39)
BACTERIA BLD CULT: NORMAL
BACTERIA BLD CULT: NORMAL
BASOPHILS # BLD AUTO: 0.06 X10*3/UL (ref 0–0.1)
BASOPHILS NFR BLD AUTO: 0.7 %
BILIRUB DIRECT SERPL-MCNC: 13.5 MG/DL (ref 0–0.3)
BILIRUB SERPL-MCNC: 24.3 MG/DL (ref 0–1.2)
BUN SERPL-MCNC: 9 MG/DL (ref 6–23)
CALCIUM SERPL-MCNC: 9.1 MG/DL (ref 8.6–10.6)
CHLORIDE SERPL-SCNC: 101 MMOL/L (ref 98–107)
CO2 SERPL-SCNC: 27 MMOL/L (ref 21–32)
CREAT SERPL-MCNC: 0.6 MG/DL (ref 0.5–1.3)
EGFRCR SERPLBLD CKD-EPI 2021: >90 ML/MIN/1.73M*2
EOSINOPHIL # BLD AUTO: 0.73 X10*3/UL (ref 0–0.7)
EOSINOPHIL NFR BLD AUTO: 7.9 %
ERYTHROCYTE [DISTWIDTH] IN BLOOD BY AUTOMATED COUNT: 17.5 % (ref 11.5–14.5)
GLUCOSE SERPL-MCNC: 80 MG/DL (ref 74–99)
HCT VFR BLD AUTO: 24.5 % (ref 41–52)
HGB BLD-MCNC: 9.1 G/DL (ref 13.5–17.5)
HGB RETIC QN: 30 PG (ref 28–38)
IMM GRANULOCYTES # BLD AUTO: 0.08 X10*3/UL (ref 0–0.7)
IMM GRANULOCYTES NFR BLD AUTO: 0.9 % (ref 0–0.9)
IMMATURE RETIC FRACTION: 4.5 %
LDH SERPL L TO P-CCNC: 479 U/L (ref 84–246)
LYMPHOCYTES # BLD AUTO: 2.25 X10*3/UL (ref 1.2–4.8)
LYMPHOCYTES NFR BLD AUTO: 24.4 %
MCH RBC QN AUTO: 29.4 PG (ref 26–34)
MCHC RBC AUTO-ENTMCNC: 37.1 G/DL (ref 32–36)
MCV RBC AUTO: 79 FL (ref 80–100)
MONOCYTES # BLD AUTO: 1.25 X10*3/UL (ref 0.1–1)
MONOCYTES NFR BLD AUTO: 13.6 %
NEUTROPHILS # BLD AUTO: 4.84 X10*3/UL (ref 1.2–7.7)
NEUTROPHILS NFR BLD AUTO: 52.5 %
NRBC BLD-RTO: 1.1 /100 WBCS (ref 0–0)
PLATELET # BLD AUTO: 190 X10*3/UL (ref 150–450)
POTASSIUM SERPL-SCNC: 3.9 MMOL/L (ref 3.5–5.3)
PROT SERPL-MCNC: 5.9 G/DL (ref 6.4–8.2)
RBC # BLD AUTO: 3.09 X10*6/UL (ref 4.5–5.9)
RETICS #: 0.29 X10*6/UL (ref 0.02–0.12)
RETICS/RBC NFR AUTO: 9.5 % (ref 0.5–2)
SODIUM SERPL-SCNC: 137 MMOL/L (ref 136–145)
WBC # BLD AUTO: 9.2 X10*3/UL (ref 4.4–11.3)

## 2024-07-21 PROCEDURE — 2500000004 HC RX 250 GENERAL PHARMACY W/ HCPCS (ALT 636 FOR OP/ED)

## 2024-07-21 PROCEDURE — 99233 SBSQ HOSP IP/OBS HIGH 50: CPT

## 2024-07-21 PROCEDURE — 85025 COMPLETE CBC W/AUTO DIFF WBC: CPT | Performed by: NURSE PRACTITIONER

## 2024-07-21 PROCEDURE — 36415 COLL VENOUS BLD VENIPUNCTURE: CPT | Performed by: NURSE PRACTITIONER

## 2024-07-21 PROCEDURE — 83615 LACTATE (LD) (LDH) ENZYME: CPT | Performed by: NURSE PRACTITIONER

## 2024-07-21 PROCEDURE — 99232 SBSQ HOSP IP/OBS MODERATE 35: CPT | Performed by: STUDENT IN AN ORGANIZED HEALTH CARE EDUCATION/TRAINING PROGRAM

## 2024-07-21 PROCEDURE — 80053 COMPREHEN METABOLIC PANEL: CPT | Performed by: NURSE PRACTITIONER

## 2024-07-21 PROCEDURE — 1170000001 HC PRIVATE ONCOLOGY ROOM DAILY

## 2024-07-21 PROCEDURE — 85045 AUTOMATED RETICULOCYTE COUNT: CPT | Performed by: NURSE PRACTITIONER

## 2024-07-21 PROCEDURE — 2500000001 HC RX 250 WO HCPCS SELF ADMINISTERED DRUGS (ALT 637 FOR MEDICARE OP): Performed by: NURSE PRACTITIONER

## 2024-07-21 PROCEDURE — 82248 BILIRUBIN DIRECT: CPT

## 2024-07-21 PROCEDURE — 2500000001 HC RX 250 WO HCPCS SELF ADMINISTERED DRUGS (ALT 637 FOR MEDICARE OP)

## 2024-07-21 RX ORDER — HYDROMORPHONE HYDROCHLORIDE 1 MG/ML
1 INJECTION, SOLUTION INTRAMUSCULAR; INTRAVENOUS; SUBCUTANEOUS ONCE
Status: COMPLETED | OUTPATIENT
Start: 2024-07-21 | End: 2024-07-21

## 2024-07-21 RX ORDER — OXYCODONE HYDROCHLORIDE 5 MG/1
15 TABLET ORAL EVERY 6 HOURS PRN
Status: DISCONTINUED | OUTPATIENT
Start: 2024-07-21 | End: 2024-07-23 | Stop reason: HOSPADM

## 2024-07-21 ASSESSMENT — COGNITIVE AND FUNCTIONAL STATUS - GENERAL
DAILY ACTIVITIY SCORE: 24
MOBILITY SCORE: 24

## 2024-07-21 ASSESSMENT — PAIN SCALES - GENERAL
PAINLEVEL_OUTOF10: 6
PAINLEVEL_OUTOF10: 9
PAINLEVEL_OUTOF10: 7
PAINLEVEL_OUTOF10: 6
PAINLEVEL_OUTOF10: 7
PAINLEVEL_OUTOF10: 6
PAINLEVEL_OUTOF10: 7
PAINLEVEL_OUTOF10: 7
PAINLEVEL_OUTOF10: 9

## 2024-07-21 ASSESSMENT — PAIN - FUNCTIONAL ASSESSMENT
PAIN_FUNCTIONAL_ASSESSMENT: 0-10

## 2024-07-21 ASSESSMENT — PAIN DESCRIPTION - LOCATION: LOCATION: ABDOMEN

## 2024-07-21 NOTE — PROGRESS NOTES
Gastroenterology Consult Service Progress Note  Department of Gastroenterology & Hepatology  [facility]    [facility]  July 20, 2024   Patient: Joellen Narayan    Medical Record: [id number]  Reason for Consult: elevated LFTs, concern for choledocholithiasis   Requesting Service: Ford/ suma-onc    Interval History: Tbili trend: 52.8 --> 47.4 --> 35.6 today. Pt feeling well. NO abd pain. Diet as been advanced.     Physical Exam:    Vitals:    07/20/24 0628 07/20/24 0906 07/20/24 1305 07/20/24 1649   BP: 138/70 133/58 135/64 113/52   BP Location: Left arm Left arm Left arm Left arm   Patient Position: Lying Sitting Lying Lying   Pulse: 61 75 78 65   Resp: 16 18 18 18   Temp: 36.6 °C (97.9 °F) 36.9 °C (98.4 °F) 36.7 °C (98.1 °F) 36.8 °C (98.2 °F)   TempSrc: Temporal Temporal Temporal Temporal   SpO2: 96% 94% 95% 95%   Weight:       Height:             Intake/Output Summary (Last 24 hours) at 7/20/2024 2045  Last data filed at 7/20/2024 0651  Gross per 24 hour   Intake 1511.25 ml   Output --   Net 1511.25 ml     Gen: chronically ill appearing, A+Ox3, in no acute distress  HEENT: PEERL, EOMI, sclera icteric, no conjunctival injection, MMM  Resp: CTAB, on 2L NC, normal breath sounds  CV: RRR, normal S1/S2  GI: non-tender, non-distended, normal BSs in all 4 quadrants, no rebound or guarding  MSK: warm and well perfused, no edema  Neuro: A+Ox3, no focal deficits  Skin: warm and dry, no lesions, no rashes     Labs:  Lab Results   Component Value Date    GLUCOSE 78 07/20/2024    CALCIUM 9.0 07/20/2024     07/20/2024    K 3.8 07/20/2024    CO2 27 07/20/2024     07/20/2024    BUN 7 07/20/2024    CREATININE 0.59 07/20/2024     Lab Results   Component Value Date    WBC 8.7 07/20/2024    HGB 9.2 (L) 07/20/2024    HCT 25.1 (L) 07/20/2024    MCV 82 07/20/2024     07/20/2024     Lab Results   Component Value Date    ALT 63 (H) 07/20/2024    AST 77 (H) 07/20/2024     " (H) 07/14/2024    ALKPHOS 162 (H) 07/20/2024    BILITOT 35.6 (H) 07/20/2024     Lab Results   Component Value Date    INR 1.2 (H) 07/16/2024    INR 1.4 (H) 06/18/2024    INR 1.5 (H) 02/17/2024    PROTIME 13.6 (H) 07/16/2024    PROTIME 15.7 (H) 06/18/2024    PROTIME 17.2 (H) 02/17/2024         Imaging:  RUQ US with doppler 7/14:     - Impression -  1. Hepatomegaly with slightly increased hepatic parenchymal  echogenicity, which may be secondary to mild diffuse fatty  infiltration versus hepatocellular disease.  2. Patent hepatic vasculature.  3. Cholelithiasis without sonographic evidence of acute cholecystitis.  4. Prominent peripancreatic and perihepatic lymph nodes, which were  also noted on 06/10/2024 CT..        MRI/MRCP w/wo 7/17:  MR MRCP WITH PANCREAS WO AND W CONTRAST (Preliminary)  This result has not been signed. Information might be incomplete.     - Impression -  1.  Interval development of mild intrahepatic and extrahepatic  biliary ductal dilation with the common bile duct measuring up to 1.0  cm. Filling defect measuring 0.3 cm at the ampulla of Vater is  compatible with choledocholithiasis. Gastroenterological or surgical  consultation is recommended.  2. Diffusion restricting hypoenhancing lesion within the superior  pole of the right kidney measuring 1.0 cm is favored to represent and  acute renal infarct given patient's history; however, given  nonvisualization on arterial phase imaging consider follow-up with CT  or MRI.  3. Interval development of hepatic iron overload. Additional sequela  of sickle cell disease as described above.  4. Cholelithiasis without evidence of cholecystitis.     GI procedures:   ERCP 7/18:  Findings  The major papilla was native. The major papilla had a normal appearance. The common bile duct was deeply cannulated after 1 attempt using a traction sphincterotome with 450 cm x 0.035\" straight guidewire. Cannulation was not difficult and no bleeding was observed. " An initial contrast injection was performed successfully in the common bile duct. The injection extended throughout the biliary tree except the cystic duct. Ductal flow was adequate. Additional filling defects were observed. The study's image quality was acceptable. Filling defect consistent with sludge was visualized in the distal common bile duct. Complete 10 mm biliary sphincterotomy was performed using a sphincterotome. No bleeding was noted at the procedure site. Multiple sweeps were performed in the common bile duct using a 8 mm balloon. Sludge and stones were removed, achieving complete clearance. Bile was noted to be draining at the end of the procedure. The pancreatic duct was not cannulated.     Assessment and Plan:        Joellen Narayan is a 23 y.o. male with Sickle cell disease (complicated by acute chest in feb 2024), Hodgkin's lymphoma (nodular lymphocyte predominant, on retux/steroids), initially admitted with acute on choric sickle cell pain.  Gastroenterology is consulted for up trending LFTs and concern for choledocholithiasis.      Patient has chronically elevated Bili, typically indirect hyperbilirubinemia 2/2 hemolysis. This admission, however, as well as once in 2022, pt has evidence of worsening indirect hyperbilirubinemia. 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 Has never had liver biopsy. Degree of this current hyperbilirubinemia likely reflects additional biliary obstruction. Concern for choledocholithiasis based on MRCP. Pt underwent ERCP on 7/18 with multiple stones and sludge, however stones were very small. Pt is s/p sphincterotomy with stone and sludge removal. There was no evidence of any extrinsic compression on cholangiogram. We are concerned with this degree of bili elevation is likely not only 2/2 biliary obstruction. Will recommend workup for  chronic liver disease as below. This was reviewed with liver transplant attending, Dr. Gomez. We have already ruled out Budd chiari with imaging form earlier this week. If there is no downtrend of bili to near normal for patient (10-15) would recommend liver biopsy to rule out VOD, vanishing duct syndrome, etc.     CLD workup: HBsAb negative (7/13/2024), HBsAg NR (6/24/2024), HBcAb total 7/13/2024. HBcAb IgM NR (7/14/2024), HA Ab IGM NR (7/14/2024), HCV Ab NR 7/13/2024. HIV NR 2022. TOMAS negative (7/17/2024), ASMA (11/16/2022), A1AT phenotype M1M1 11/16/2022. Ceruloplasmin 38 (11/16/2022). Last set of iron labs from 2021.      #mixed marked hyperbilirubinemia:  #choledocholithiasis, s/p sphincterotomy   - please check D bili along with CMP daily  - please repeat iron panel and ferritin   - appreciate surgical service for CCY   - Okay to advance diet to regular  - if patient does not have downtrend of Bili closer to baseline (10-15) over weekend, would recommend proceeding with IR guided liver biopsy (would recommend TJ in order to get portal pressures)    Patient seen and discussed with attending, Dr. Lopez.     Ping Reno, GI fellow    Thank you for the consultation. Gastroenterology will continue to the follow the patient.   Please do not hesitate to contact me on Doc[employer]alo or page [phone] if there are any further questions between the weekday hours of 7 AM - 5 PM.   If there is an urgent concern during the weekend, after-hours, or holidays; then please page the [place] GI fellow [place] [phone]. Thank you.    SIGNATURE: Pnig Reno MD PATIENT NAME: Joellen Narayan   DATE: July 20, 2024 MRN: [id number]

## 2024-07-21 NOTE — PROGRESS NOTES
Gastroenterology Consult Service Progress Note  Department of Gastroenterology & Hepatology  [facility] [place]    [facility]  July 21, 2024   Patient: Joellen Narayan    Medical Record: [id number]  Reason for Initial Consult: elevated LFTs, concern for choledocholithiasis     Interval History:   > LFTs continue to downtrend to normal (7/15 - 7/21):     ALT: 82 > 92 > 82 > 74 > ERCP > 67 > 63 > 64 today  AST: 115 > 100 > 99 > ERCP > 84 > 77 > 84 today   tBili: 24.5 >37.8 > 39.9 > 52.8 > ERCP > 47.4 > 35.6 > 24.3 today  > Patient reports improvement of abdominal pain   > Discussed with patient the potential for liver workup if LFTs do not return to patient's baseline  > Iron panel revealed: Iron 183, ferritin 625, TIBC 329      Physical Exam:    Vitals:    07/20/24 2201 07/21/24 0122 07/21/24 0913 07/21/24 1430   BP: 124/71 110/61 125/67 125/59   BP Location: Left arm Left arm Left arm Left arm   Patient Position: Lying Lying Lying Lying   Pulse: 77 62 60 67   Resp: 18 16 16 16   Temp: 36.7 °C (98.1 °F) 36.7 °C (98.1 °F) 36.7 °C (98.1 °F) 37.1 °C (98.8 °F)   TempSrc: Temporal Temporal Temporal Temporal   SpO2: 95% 94% 96% 95%   Weight:       Height:             Intake/Output Summary (Last 24 hours) at 7/21/2024 1533  Last data filed at 7/21/2024 0912  Gross per 24 hour   Intake --   Output 600 ml   Net -600 ml       Gen: chronically ill appearing, A+Ox3, in no acute distress  HEENT: PEERL, EOMI, sclera icteric, no conjunctival injection, MMM  Resp: CTAB, on 2L NC, normal breath sounds  CV: RRR, normal S1/S2  GI: non-tender, non-distended, normal BSs in all 4 quadrants, no rebound or guarding  MSK: warm and well perfused, no edema  Neuro: A+Ox3, no focal deficits  Skin: warm and dry, no lesions, no rashes        Labs:  Lab Results   Component Value Date    WBC 9.2 07/21/2024    HGB 9.1 (L) 07/21/2024    HCT 24.5 (L) 07/21/2024    MCV 79 (L) 07/21/2024     07/21/2024      Lab Results   Component Value Date    GLUCOSE 80 07/21/2024    CALCIUM 9.1 07/21/2024     07/21/2024    K 3.9 07/21/2024    CO2 27 07/21/2024     07/21/2024    BUN 9 07/21/2024    CREATININE 0.60 07/21/2024     Lab Results   Component Value Date    IRON 183 (H) 07/20/2024    TIBC 329 07/20/2024    FERRITIN 625 (H) 07/20/2024     Lab Results   Component Value Date    INR 1.2 (H) 07/16/2024    INR 1.4 (H) 06/18/2024    INR 1.5 (H) 02/17/2024    PROTIME 13.6 (H) 07/16/2024    PROTIME 15.7 (H) 06/18/2024    PROTIME 17.2 (H) 02/17/2024       Imaging:  === 07/08/24 ===    US LIVER WITH DOPPLER    - Impression -  1. Hepatomegaly with slightly increased hepatic parenchymal  echogenicity, which may be secondary to mild diffuse fatty  infiltration versus hepatocellular disease.  2. Patent hepatic vasculature.  3. Cholelithiasis without sonographic evidence of acute cholecystitis.  4. Prominent peripancreatic and perihepatic lymph nodes, which were  also noted on 06/10/2024 CT..      I personally reviewed the images/study and I agree with the findings  as stated by Resident Francisco Wolff MD.    MACRO:  None    Signed by: Curtis Johnson 7/14/2024 5:29 PM  Dictation workstation:   [id number]  === 06/18/24 ===    CT CHEST ABDOMEN PELVIS W IV CONTRAST    - Impression -  CHEST:  1.  No acute cardiopulmonary process.  2. Debris within the esophageal lumen which places the patient at  increased risk of aspiration.    ABDOMEN-PELVIS:  1. No evidence of pneumoperitoneum.  2. Moderate distention of the stomach and colon which appears similar  to 02/16/2024.  3. Moderate distention of the urinary bladder and mild prominence of  the ureters. Please correlate for urinary retention.  4. Skin thickening of the visualized penis. Correlation with physical  examination is suggested.      MACRO:  None    Signed by: Omer Montalvo 6/18/2024 7:48 PM  Dictation workstation:   [id number]  === 07/08/24 ===    MR MEDRANO WITH  "PANCREAS WO AND W CONTRAST    - Impression -  1.  Interval development of mild intrahepatic and extrahepatic  biliary ductal dilation with the common bile duct measuring up to 1.0  cm. A small nonenhancing filling defect measuring 0.3 cm at the  ampulla of Vater, compatible with choledocholithiasis.  Gastroenterological or surgical consultation is recommended.  2. Diffusion restricting hypoenhancing lesion within the superior  pole of the right kidney measuring up to 1.0 cm is favored to  represent focal renal infarct given patient's history; however, given  suspected early arterial enhancement follow-up with CT or MRI is  recommended to rule out neoplasm.  3. Mild diffuse hepatic parenchymal iron overload. Additional sequela  of sickle cell disease as described above.  4. Cholelithiasis without evidence of acute cholecystitis.    I personally reviewed the images/study with Cyrus Grace MD (Radiology  Resident) and I agree with the findings as stated.    MACRO:  Critical Finding:  See findings. Notification was initiated on  7/17/2024 at 12:16 pm by  Cyrus Grace.  (**-YCF-**) Instructions:    Signed by: Curtis Johnson 7/18/2024 9:55 AM  Dictation workstation:   [id number]    GI procedures  ERCP 7/18:  Findings  The major papilla was native. The major papilla had a normal appearance. The common bile duct was deeply cannulated after 1 attempt using a traction sphincterotome with 450 cm x 0.035\" straight guidewire. Cannulation was not difficult and no bleeding was observed. An initial contrast injection was performed successfully in the common bile duct. The injection extended throughout the biliary tree except the cystic duct. Ductal flow was adequate. Additional filling defects were observed. The study's image quality was acceptable. Filling defect consistent with sludge was visualized in the distal common bile duct. Complete 10 mm biliary sphincterotomy was performed using a sphincterotome. No bleeding was noted at " the procedure site. Multiple sweeps were performed in the common bile duct using a 8 mm balloon. Sludge and stones were removed, achieving complete clearance. Bile was noted to be draining at the end of the procedure. The pancreatic duct was not cannulated.        Assessment and Plan:        Joellen Narayan is a 23 y.o. male with Sickle cell disease (complicated by acute chest in feb 2024), Hodgkin's lymphoma (nodular lymphocyte predominant, on retux/steroids), initially admitted with acute on choric sickle cell pain.  Gastroenterology is consulted for up trending LFTs and concern for choledocholithiasis.      Patient has chronically elevated Bili, typically indirect hyperbilirubinemia 2/2 hemolysis. This admission, however, as well as once in 2022, pt has evidence of worsening indirect hyperbilirubinemia. 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 Has never had liver biopsy. Degree of this current hyperbilirubinemia likely reflects additional biliary obstruction. Concern for choledocholithiasis based on MRCP. Pt underwent ERCP on 7/18 with multiple stones and sludge, however stones were very small. Pt is s/p sphincterotomy with stone and sludge removal. There was no evidence of any extrinsic compression on cholangiogram. We are concerned with this degree of bili elevation is likely not only 2/2 biliary obstruction however is returning to normal. Will recommend workup for chronic liver disease as below. This was reviewed with liver transplant attending, Dr. Gomez. We have already ruled out Budd chiari with imaging form earlier this week. If there is no downtrend of bili to near normal for patient (10-15) would recommend liver biopsy to rule out VOD, vanishing duct syndrome, etc. This plan was discussed with patient who was in agreement.       CLD workup: HBsAb negative (7/13/2024),  HBsAg NR (6/24/2024), HBcAb total 7/13/2024. HBcAb IgM NR (7/14/2024), HA Ab IGM NR (7/14/2024), HCV Ab NR 7/13/2024. HIV NR 2022. TOMAS negative (7/17/2024), ASMA (11/16/2022), A1AT phenotype M1M1 11/16/2022. Ceruloplasmin 38 (11/16/2022). Last set of iron labs from 2021.      #mixed marked hyperbilirubinemia:  #choledocholithiasis, s/p sphincterotomy   - please continue to check D bili along with CMP daily  - appreciate surgical service for CCY   - continue to monitor bili, if not closer to baseline (10-15), would recommend proceeding with IR guided liver biopsy (would recommend TJ in order to get portal pressures)    Patient seen and discussed with attending, Dr. Lopez.      Pradeep Acuña MD, PhD  Gastroenterology Fellow, PGY-6  [facility]   Division of Gastroenterology and Liver Disease       Thank you for the consultation. Gastroenterology will continue to the follow the patient.   Please do not hesitate to contact me on DocHalo or page [phone] if there are any further questions between the weekday hours of 7 AM - 5 PM.   If there is an urgent concern during the weekend, after-hours, or holidays; then please page the [place] [phone]. Thank you.    SIGNATURE: Pradeep Acuña MD PATIENT NAME: Joellen Narayan   DATE: July 21, 2024 MRN: [id number]

## 2024-07-21 NOTE — CARE PLAN
Problem: Fall/Injury  Goal: Not fall by end of shift  Outcome: Progressing  Goal: Be free from injury by end of the shift  Outcome: Progressing  Goal: Verbalize understanding of personal risk factors for fall in the hospital  Outcome: Progressing  Goal: Verbalize understanding of risk factor reduction measures to prevent injury from fall in the home  Outcome: Progressing  Goal: Use assistive devices by end of the shift  Outcome: Progressing  Goal: Pace activities to prevent fatigue by end of the shift  Outcome: Progressing     Problem: Fall/Injury  Goal: Not fall by end of shift  Outcome: Progressing  Goal: Be free from injury by end of the shift  Outcome: Progressing  Goal: Verbalize understanding of personal risk factors for fall in the hospital  Outcome: Progressing  Goal: Verbalize understanding of risk factor reduction measures to prevent injury from fall in the home  Outcome: Progressing  Goal: Use assistive devices by end of the shift  Outcome: Progressing  Goal: Pace activities to prevent fatigue by end of the shift  Outcome: Progressing     Problem: Fall/Injury  Goal: Not fall by end of shift  Outcome: Progressing  Goal: Be free from injury by end of the shift  Outcome: Progressing  Goal: Verbalize understanding of personal risk factors for fall in the hospital  Outcome: Progressing  Goal: Verbalize understanding of risk factor reduction measures to prevent injury from fall in the home  Outcome: Progressing  Goal: Use assistive devices by end of the shift  Outcome: Progressing  Goal: Pace activities to prevent fatigue by end of the shift  Outcome: Progressing   The patient's goals for the shift include Get at least 6-8 hrs of sleep/rest during shift. Rate pain at acceptable level    The clinical goals for the shift include Patient will remain free of falls and HDS throughout shift,    Over the shift, the patient did  make progress toward the following goals. Patient rates pain 6/10 at am assessment. Lab  monitoring for the weekend..

## 2024-07-21 NOTE — PROGRESS NOTES
Joellen Narayan is a 23 y.o. male on day 13 of admission presenting with Sickle cell crisis (Multi).    Subjective   Overnight: PATEO  Endorses some pain overnight, but controlled. Deneis any fevers, chills, SOB. No other complaints.    Objective     Physical Exam  HENT:      Head: Normocephalic and atraumatic.      Right Ear: External ear normal.      Left Ear: External ear normal.      Nose: Nose normal.   Eyes:      General: Scleral icterus present.   Cardiovascular:      Rate and Rhythm: Normal rate and regular rhythm.   Pulmonary:      Effort: Pulmonary effort is normal.      Breath sounds: Normal breath sounds.   Abdominal:      General: Bowel sounds are normal.      Palpations: Abdomen is soft.   Musculoskeletal:         General: Normal range of motion.      Cervical back: Normal range of motion and neck supple.   Skin:     General: Skin is warm and dry.   Neurological:      General: No focal deficit present.      Mental Status: He is alert and oriented to person, place, and time. Mental status is at baseline.   Psychiatric:         Mood and Affect: Mood normal.         Behavior: Behavior normal.         Thought Content: Thought content normal.       Visit Vitals  /61 (BP Location: Left arm, Patient Position: Lying)   Pulse 62   Temp 36.7 °C (98.1 °F) (Temporal)   Resp 16     .Scheduled medications  diclofenac sodium, 4 g, Topical, 4x daily  enoxaparin, 40 mg, subcutaneous, q24h  folic acid, 1 mg, oral, Daily  lidocaine, 0.1 mL, subcutaneous, Once  lidocaine, 1 patch, transdermal, Daily  pantoprazole, 40 mg, oral, Daily before breakfast  polyethylene glycol, 17 g, oral, Daily  sennosides-docusate sodium, 2 tablet, oral, BID      Continuous medications     PRN medications  PRN medications: acetaminophen, diphenhydrAMINE, HYDROmorphone, lidocaine, naproxen, ondansetron, oxygen, oxygen    Relevant Results  Bilirubin, Direct  0.0 - 0.3 mg/dL 13.5 High  19.2 High  22.8 High      Results from last 7 days   Lab  Units 07/21/24  0722 07/20/24  0709 07/19/24  0716   WBC AUTO x10*3/uL 9.2 8.7 6.9   HEMOGLOBIN g/dL 9.1* 9.2* 9.2*   HEMATOCRIT % 24.5* 25.1* 24.9*   PLATELETS AUTO x10*3/uL 190 178 202      Results from last 7 days   Lab Units 07/21/24  0722 07/20/24  0709 07/19/24  0716   SODIUM mmol/L 137 139 138   POTASSIUM mmol/L 3.9 3.8 3.5   CHLORIDE mmol/L 101 103 103   CO2 mmol/L 27 27 27   BUN mg/dL 9 7 5*   CREATININE mg/dL 0.60 0.59 0.60   CALCIUM mg/dL 9.1 9.0 9.0   PROTEIN TOTAL g/dL 5.9* 5.5* 5.2*   BILIRUBIN TOTAL mg/dL 24.3* 35.6* 47.4*   ALK PHOS U/L 166* 162* 167*   ALT U/L 64* 63* 67*   AST U/L 84* 77* 84*   GLUCOSE mg/dL 80 78 80      Assessment/Plan   Joellen Narayan is a 23 y.o. 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 On IV dilaudid for pain management (7/8- current). 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 7/17 s/p x2u pRBC per Dr. Cruz to optimize for upcoming procedure. MRCP read c/f acute renal artery infarct, labs and physical exam WNL, no need for CTA at this time per primary attending. 7/19 ACS consulted for eval of inpatient CCY, plan for possible cholecystectomy next week while inpatient.  Per GI; if bilirubin does not downtrend to baseline- likely can plan for TJ biopsy however will need to discuss plan and risk/benefit. DC home resumed O2 pending improvement in pain, LFTs, and final ACS/GI recs     Updates 7/21:  - GI recs appreciated. Reg diet and monitor bili closely: if not near baseline (10-15)  over weekend, would recommend proceeding with IR guided liver biopsy.  - will need to start weaning off IV pain medication: patient agreeable to alternating PO and IV. Switched IV dilaudid PRN to Q6 PRN and reintroduced home PO regimen (oxy 15) Q6 prn severe pain with 3 hours overlap between them to alternate.     # transaminitis  # Mixed Hyperbilirubinemia  #Choledocholithiasis, s/p sphincterotomy   - Baseline tbili ~ 6-7  - Likely reactive 2/2 crisis vs c/f developing sickle hepatopathy vs choledocholithiasis vs obstructive cholestasis from tumor    - 7/14 RUQ U/S showing cholelithiasis without cholecystitis, increased hepatic parenchymal echogenicity 2/2 mild diffuse fatty infiltration versus hepatocellular disease  - 7/13 Hepatitis panel negative, 7/15 CMV negative  - 7/16 Hematology consulted c/f sickle hepatopathy and possible need for RBCex, no urgent RBCex yet, rec hydration, repeat Hg S, agree with MRCP-->no plans for RBCex at this time, cont IVF and monitor labs; signed off 7/19  - s/p 1/2NS (7/16-7/20)  - s/p Unasyn for empiric coverage (7/17-7/19)   - 7/17 MRCP showing interval development of mild intrahepatic and extrahepatic biliary ductal dilation with the common bile duct, filling defect at the ampulla of Vater c/w choledocholithiasis  - 7/17 GI consulted, s/p ERCP 7/18 for biliary sphincterotomy and sludge sweep and stones removal, rec monitor for downtrend of LFTs, monitor for post-ERCP pancreatitis, and rec consult surgical services for cholecystomy   - 7/19 ACS consulted for eval for inpatient CCY; consider internal cholecystectomy possibly early next week   - ongoing monitoring of bili and direct bili; all labs downtrending   - per GI possible plan for TJ biopsy if bili is around 10    # Hgb SS with acute on chronic pain  - OARRS reviewed, no aberrancies  - No current care path  - Most recent urgent RBC exchange Feb 2024 for Acute Chest Syndrome  - Hgb baseline ~ 7-8; s/p x2u PRBC per Dr. Cruz  for ERCP procedure  - LDH baseline fluctuates but ~500  - Pt with CP and SOB, however these are all consistent with pt's baseline presentation of sickle cell crisis. In the absence of imaging findings c/f infection or ACS, no wheezing, and no fevers-- low c/f ACS at this time, however will closely monitor  - CXR (7/8) without evidence of acute process; 7/11 repeat CXR unchanged from prior   - Troponin (7/8): 12  - EKG (7/8): NSR with PACs, no T wave inversion,   - continue IVP dilaudid q4 PRN (7/17-current)   - S/p IV Toradol 30mg q6hrs x3 days (7/8-7/11) with Protonix for PPI prophy  - Supportive care: lidocaine patch, voltaren gel x4/day, hot packs   - Continue home folic acid 1mg daily  - Utox (7/8): prelim + cannabinoid  - Hgb S (7/8) 74.2%, 7/16 Hg S 71.5%   - PO Zofran and compazine PRN for opioid-induced nausea, PO Benadryl PRN for opioid-induced pruritus, bowel regimen for opioid-induced constipation with DocuSenna 2tabs BID and Miralax daily (LBM 7/18)  - 7/16 Exchange labs sent/resulted and HELLEN negative     # Nodular lymphocyte predominant Hodgkins lymphoma (NLPHL)   - Primary Oncologist: Dr. Stoll - saw pt at bedside 7/17   - Enlarged lymph nodes noted 4/1/22  - RUQ US (11/14/22) with mildly enlarged LNs in the region of the kavin hepatis  - MRI liver (11/16/22) showed re-demonstration of bulky retroperitoneal lymphadenopathy and kavin hepatic lymphadenopathy    - (11/18/22) lymph node biopsy showed atypical lymphoid infiltrate. Reviewed by Hemepath board, insufficient for lymphoma diagnosis  - PET/CT (12/6/22) showing retroperitoneal lymphadenopathy  - Followed up with Dr. Stoll (12/16/22) with plan for surg/onc consult for large tissue bx but patient missed apt and was lost to follow up  - Requested new apt with Dr. Ronnie Marte 6/19/23, patient was not seen and lost to follow up  - CT A/P (11/28/23) increased size of retroperitoneal lymph nodes reflecting extramedullary hematopoiesis I/s/o  sickle cell vs lymphoma  - Paraaortic LN bx via IR (11/30/23) consistent with NLPHL. Flow: no clonal B cell or T cell population identified, lymphocyte 95%, CD3+CD4+ 68%, CD3+CD8+ 7%, CD19+ 24%  - Elevated LDH/bili partially from sickle cell disease   - Chemotherapy (R-CHOP) was discussed with primary oncologist Dr. Stoll, and decision was made to simplify his chemotherapy to Rituxan and prednisone q3 weeks mainly due to frequent sickle cell crisis  - Current chemo regimen: rituxan and prednisone q3 weeks (C1 1/18/24, C2 2/8/24, Missed C3 d/t sickle cell pain crisis, C4 4/4/24, C5 5/16/24, C6 6/7/24)  - Per pt no longer taking Acyclovir 400mg BID prophy   - Planned for re-staging PET CT 7/15. Will reschedule as outpatient (Dr Stoll notified)     # Hx Priapism  - Last episode 6/18; pt presented to the ED with Priapism. Urology consulted at that time and unable to aspirate blood, however rec no acute further interventions necessary as there was improvement with conservative management  - No active issues with priapism currently  - Can order Sudafed q4h PRN if reoccurs      # Hx of nocturnal oxygen dependence and hypoxia on room air  - Historically improves with oxygen supplementation  - Pt hypoxic on admission (SpO2 84% on RA while asleep), placed on 2L supp O2    - CXR (7/8) without evidence of acute process; pt additionally denies SOB  - Wean as able, encourage incentive spirometry  - 6/2024 failed x1 walking pulse ox, 6/25 pt was on RA however failed walking pulse ox and was sent home on 2L continuous O2   - Pulm FUV 8/5     # DVT prophy  - Lovenox subcutaneous, SCDs, encourage ambulation     # dispo   - Full code, confirmed on admit  - DC home resumed O2 pending improvement in pain, LFTs, and final GI/Heme recs  - NOK: Melissa Narayan (mother) [phone] - updated bedside 7/19   - FUV Dr. Stoll 7/25, Pulm 8/8, Sickle Cell FUV requested/pending    Mahnaz Husain MD

## 2024-07-21 NOTE — CARE PLAN
The clinical goals for the shift include Patient will remain free of falls and HDS throughout shift,    VSS, afebrile, no complaints V/D this shift. Pt remained safe and free of falls/injury. Medicated w/ PRN meds per orders.

## 2024-07-22 LAB
ALBUMIN SERPL BCP-MCNC: 4.1 G/DL (ref 3.4–5)
ALP SERPL-CCNC: 156 U/L (ref 33–120)
ALT SERPL W P-5'-P-CCNC: 76 U/L (ref 10–52)
ANION GAP SERPL CALC-SCNC: 15 MMOL/L (ref 10–20)
AST SERPL W P-5'-P-CCNC: 116 U/L (ref 9–39)
BASOPHILS # BLD AUTO: 0.05 X10*3/UL (ref 0–0.1)
BASOPHILS NFR BLD AUTO: 0.5 %
BILIRUB DIRECT SERPL-MCNC: 9.8 MG/DL (ref 0–0.3)
BILIRUB SERPL-MCNC: 23.4 MG/DL (ref 0–1.2)
BUN SERPL-MCNC: 8 MG/DL (ref 6–23)
CALCIUM SERPL-MCNC: 9.3 MG/DL (ref 8.6–10.6)
CHLORIDE SERPL-SCNC: 100 MMOL/L (ref 98–107)
CO2 SERPL-SCNC: 24 MMOL/L (ref 21–32)
CREAT SERPL-MCNC: 0.59 MG/DL (ref 0.5–1.3)
EGFRCR SERPLBLD CKD-EPI 2021: >90 ML/MIN/1.73M*2
EOSINOPHIL # BLD AUTO: 0.45 X10*3/UL (ref 0–0.7)
EOSINOPHIL NFR BLD AUTO: 4.6 %
ERYTHROCYTE [DISTWIDTH] IN BLOOD BY AUTOMATED COUNT: 19.1 % (ref 11.5–14.5)
GLUCOSE SERPL-MCNC: 75 MG/DL (ref 74–99)
HCT VFR BLD AUTO: 26.5 % (ref 41–52)
HGB BLD-MCNC: 9.4 G/DL (ref 13.5–17.5)
HGB RETIC QN: 28 PG (ref 28–38)
IMM GRANULOCYTES # BLD AUTO: 0.12 X10*3/UL (ref 0–0.7)
IMM GRANULOCYTES NFR BLD AUTO: 1.2 % (ref 0–0.9)
IMMATURE RETIC FRACTION: 3.9 %
LDH SERPL L TO P-CCNC: 547 U/L (ref 84–246)
LYMPHOCYTES # BLD AUTO: 1.38 X10*3/UL (ref 1.2–4.8)
LYMPHOCYTES NFR BLD AUTO: 14 %
MCH RBC QN AUTO: 29.1 PG (ref 26–34)
MCHC RBC AUTO-ENTMCNC: 35.5 G/DL (ref 32–36)
MCV RBC AUTO: 82 FL (ref 80–100)
MONOCYTES # BLD AUTO: 1.27 X10*3/UL (ref 0.1–1)
MONOCYTES NFR BLD AUTO: 12.9 %
NEUTROPHILS # BLD AUTO: 6.59 X10*3/UL (ref 1.2–7.7)
NEUTROPHILS NFR BLD AUTO: 66.8 %
NRBC BLD-RTO: 1.1 /100 WBCS (ref 0–0)
PLATELET # BLD AUTO: 178 X10*3/UL (ref 150–450)
POTASSIUM SERPL-SCNC: 4.1 MMOL/L (ref 3.5–5.3)
PROT SERPL-MCNC: 6.4 G/DL (ref 6.4–8.2)
RBC # BLD AUTO: 3.23 X10*6/UL (ref 4.5–5.9)
RETICS #: 0.28 X10*6/UL (ref 0.02–0.12)
RETICS/RBC NFR AUTO: 8.8 % (ref 0.5–2)
SODIUM SERPL-SCNC: 135 MMOL/L (ref 136–145)
WBC # BLD AUTO: 9.9 X10*3/UL (ref 4.4–11.3)

## 2024-07-22 PROCEDURE — 80053 COMPREHEN METABOLIC PANEL: CPT | Performed by: NURSE PRACTITIONER

## 2024-07-22 PROCEDURE — 85045 AUTOMATED RETICULOCYTE COUNT: CPT | Performed by: NURSE PRACTITIONER

## 2024-07-22 PROCEDURE — 36415 COLL VENOUS BLD VENIPUNCTURE: CPT | Performed by: NURSE PRACTITIONER

## 2024-07-22 PROCEDURE — 2500000001 HC RX 250 WO HCPCS SELF ADMINISTERED DRUGS (ALT 637 FOR MEDICARE OP)

## 2024-07-22 PROCEDURE — 82248 BILIRUBIN DIRECT: CPT

## 2024-07-22 PROCEDURE — 2500000004 HC RX 250 GENERAL PHARMACY W/ HCPCS (ALT 636 FOR OP/ED)

## 2024-07-22 PROCEDURE — 1170000001 HC PRIVATE ONCOLOGY ROOM DAILY

## 2024-07-22 PROCEDURE — 85025 COMPLETE CBC W/AUTO DIFF WBC: CPT | Performed by: NURSE PRACTITIONER

## 2024-07-22 PROCEDURE — 2500000001 HC RX 250 WO HCPCS SELF ADMINISTERED DRUGS (ALT 637 FOR MEDICARE OP): Performed by: NURSE PRACTITIONER

## 2024-07-22 PROCEDURE — 83615 LACTATE (LD) (LDH) ENZYME: CPT | Performed by: NURSE PRACTITIONER

## 2024-07-22 PROCEDURE — 99233 SBSQ HOSP IP/OBS HIGH 50: CPT

## 2024-07-22 ASSESSMENT — COGNITIVE AND FUNCTIONAL STATUS - GENERAL
DAILY ACTIVITIY SCORE: 24
MOBILITY SCORE: 24

## 2024-07-22 ASSESSMENT — PAIN - FUNCTIONAL ASSESSMENT
PAIN_FUNCTIONAL_ASSESSMENT: 0-10

## 2024-07-22 ASSESSMENT — PAIN SCALES - GENERAL
PAINLEVEL_OUTOF10: 8
PAINLEVEL_OUTOF10: 8
PAINLEVEL_OUTOF10: 6
PAINLEVEL_OUTOF10: 8
PAINLEVEL_OUTOF10: 7

## 2024-07-22 NOTE — PROGRESS NOTES
[facility]  ACUTE CARE SURGERY - PROGRESS NOTE    Patient Name: Joellen Narayan  MRN: [id number]  Admit Date: 708  : 2000  AGE: 23 y.o.   GENDER: male  ==============================================================================  TODAY'S ASSESSMENT AND PLAN OF CARE:  Joellen Narayan is a 23 y.o. 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 RUQ US with stones an sludge in the gallbladder, no evidence of cholecystitis. MRCP with intra and extrahepatic ductal dilation and filling defect at ampulla of vater consistent with choledocholithiasis. ERCP with stones in CBD. Given the degree of elevation of his T bili with his baseline 5-10, may also be a degree of hepatocellular disease/sickle hepatopathy.       Recommendations:  - would defer cholecystectomy until bilirubin has completely normalized (remains at 23 today)       - patient without radiographic or clinical signs of acute cholecystitis  - we have placed an outpatient follow-up with Dr. Nowak to discuss outpatient cholecystectomy in the discharge navigator  - please call with any questions    Patient seen and plans discussed with Chief Resident Dr. DONG Shepherd, PGY-5 and Attending Physician Dr. Ford.    Marie Bruner MD PGY-1  Acute Care Surgery [id number]      ==============================================================================  CHIEF COMPLAINT / EVENTS LAST 24HRS / HPI:  Joellen Narayan is a 23 y.o. 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 RUQ US with stones an sludge in the gallbladder, no evidence of  cholecystitis. MRCP with intra and extrahepatic ductal dilation and filling defect at ampulla of vater consistent with choledocholithiasis. ERCP with stones in CBD. Given the degree of elevation of his T bili with his baseline 5-10, may also be a degree of hepatocellular disease/sickle hepatopathy.     NAEON Pt endorsing improved pain, no n/v, having flatus and bowel movements.    MEDICAL HISTORY / ROS:   Admission history and ROS reviewed. Pertinent changes as follows:  No new    PHYSICAL EXAM:  Heart Rate:  [58-70]   Temp:  [36.7 °C (98.1 °F)-37.1 °C (98.8 °F)]   Resp:  [16]   BP: (122-128)/(59-71)   SpO2:  [94 %-96 %]   Physical Exam  Abdominal:      General: Abdomen is flat. There is no distension.      Palpations: Abdomen is soft.      Tenderness: There is no abdominal tenderness. There is no guarding.   Skin:     General: Skin is warm and dry.   Neurological:      General: No focal deficit present.      Mental Status: He is alert and oriented to person, place, and time.   Psychiatric:         Mood and Affect: Mood normal.         Behavior: Behavior normal.         IMAGING SUMMARY:  (summary of new imaging findings, not a copy of dictation)  No new    LABS:  Results from last 7 days   Lab Units 07/21/24  0722 07/20/24  0709 07/19/24  0716   WBC AUTO x10*3/uL 9.2 8.7 6.9   HEMOGLOBIN g/dL 9.1* 9.2* 9.2*   HEMATOCRIT % 24.5* 25.1* 24.9*   PLATELETS AUTO x10*3/uL 190 178 202   NEUTROS PCT AUTO % 52.5 58.3 52.7   LYMPHS PCT AUTO % 24.4 18.9 19.4   MONOS PCT AUTO % 13.6 13.3 19.0   EOS PCT AUTO % 7.9 8.0 6.9     Results from last 7 days   Lab Units 07/16/24  1543   APTT seconds 28   INR  1.2*     Results from last 7 days   Lab Units 07/21/24  0722 07/20/24  0709 07/19/24  0716   SODIUM mmol/L 137 139 138   POTASSIUM mmol/L 3.9 3.8 3.5   CHLORIDE mmol/L 101 103 103   CO2 mmol/L 27 27 27   BUN mg/dL 9 7 5*   CREATININE mg/dL 0.60 0.59 0.60   CALCIUM mg/dL 9.1 9.0 9.0   PROTEIN TOTAL g/dL 5.9* 5.5* 5.2*   BILIRUBIN  TOTAL mg/dL 24.3* 35.6* 47.4*   ALK PHOS U/L 166* 162* 167*   ALT U/L 64* 63* 67*   AST U/L 84* 77* 84*   GLUCOSE mg/dL 80 78 80     Results from last 7 days   Lab Units 07/21/24  0722 07/20/24  0709 07/19/24  0716 07/18/24  1014 07/17/24  0813   BILIRUBIN TOTAL mg/dL 24.3* 35.6* 47.4*   < > 39.9*   BILIRUBIN DIRECT mg/dL 13.5* 19.2*  --   --  22.8*    < > = values in this interval not displayed.           I have reviewed all medications, laboratory results, and imaging pertinent for today's encounter.

## 2024-07-22 NOTE — CARE PLAN
Problem: Fall/Injury  Goal: Not fall by end of shift  7/22/2024 1151 by Shalonda Christie RN  Outcome: Progressing  7/22/2024 1150 by Shalonda Christie RN  Outcome: Progressing  Goal: Be free from injury by end of the shift  7/22/2024 1151 by Shalonda Christie RN  Outcome: Progressing  7/22/2024 1150 by Shalonda Christie RN  Outcome: Progressing  Goal: Verbalize understanding of personal risk factors for fall in the hospital  7/22/2024 1151 by Shalonda Christie RN  Outcome: Progressing  7/22/2024 1150 by Shalonda Christie RN  Outcome: Progressing  Goal: Verbalize understanding of risk factor reduction measures to prevent injury from fall in the home  7/22/2024 1151 by Shalonda Christie RN  Outcome: Progressing  7/22/2024 1150 by Shalonda Christie RN  Outcome: Progressing  Goal: Use assistive devices by end of the shift  7/22/2024 1151 by Shalonda Christie RN  Outcome: Progressing  7/22/2024 1150 by Shalonda Christie RN  Outcome: Progressing  Goal: Pace activities to prevent fatigue by end of the shift  7/22/2024 1151 by Shalonda Christie RN  Outcome: Progressing  7/22/2024 1150 by Shalonda Christie RN  Outcome: Progressing   The patient's goals for the shift include Get at least 6-8 hrs of sleep/rest during shift.    The clinical goals for the shift include pain control    Over the shift, the patient did make progress toward the following goals. Pt rating pain 6/10 upon am assessment. Rotating IV and PO pain meds continues with some effect.

## 2024-07-22 NOTE — PROGRESS NOTES
"Joellen Narayan is a 23 y.o. male on day 14 of admission presenting with Sickle cell crisis (Multi).    Subjective   NAEON. Seen at bedside, Denies N/V/D/C, chest pain, cough, shortness of breath.  Wants to go home. We discussed waiting to hear back from ACS and what his bili are. No other complaints.        Objective     Physical Exam  Constitutional:       General: He is not in acute distress.     Appearance: He is not toxic-appearing.   HENT:      Head: Normocephalic and atraumatic.   Eyes:      General: Scleral icterus present.      Extraocular Movements: Extraocular movements intact.   Cardiovascular:      Rate and Rhythm: Normal rate and regular rhythm.   Pulmonary:      Effort: No respiratory distress.   Abdominal:      General: Abdomen is flat.      Palpations: Abdomen is soft.      Tenderness: There is no abdominal tenderness.   Musculoskeletal:         General: No swelling or tenderness.   Skin:     Coloration: Skin is not jaundiced.      Findings: No bruising or erythema.   Neurological:      Mental Status: He is oriented to person, place, and time. Mental status is at baseline.         Last Recorded Vitals  Blood pressure 120/67, pulse 65, temperature 36.4 °C (97.5 °F), temperature source Temporal, resp. rate 16, height 1.88 m (6' 2\"), weight 72.6 kg (160 lb), SpO2 93%.  Intake/Output last 3 Shifts:  I/O last 3 completed shifts:  In: - (0 mL/kg)   Out: 1750 (24.1 mL/kg) [Urine:1750 (0.7 mL/kg/hr)]  Weight: 72.6 kg       Assessment/Plan   Principal Problem:    Sickle cell crisis (Multi)  Active Problems:    Choledocholithiasis    Joellen Narayan is a 23 y.o. 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 On IV dilaudid for pain management (7/8- current). 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 7/17 s/p x2u pRBC per Dr. Cruz to optimize for upcoming procedure. MRCP read c/f acute renal artery infarct, labs and physical exam WNL, no need for CTA at this time per primary attending. 7/19 ACS consulted for eval of inpatient CCY, plan for possible cholecystectomy next week while inpatient.  Per GI; if bilirubin does not downtrend to baseline- likely can plan for TJ biopsy however will need to discuss plan and risk/benefit. Per ACS, patient will receive CCY as outpatient when  bili returns to baseline, no need to stay inpatient for this. DC home resumed O2 pending improvement in pain, LFTs, and final GI recs.        # transaminitis  # Mixed Hyperbilirubinemia  #Choledocholithiasis, s/p sphincterotomy   - Baseline tbili ~ 6-7  - Likely reactive 2/2 crisis vs c/f developing sickle hepatopathy vs choledocholithiasis vs obstructive cholestasis from tumor    - 7/14 RUQ U/S showing cholelithiasis without cholecystitis, increased hepatic parenchymal echogenicity 2/2 mild diffuse fatty infiltration versus hepatocellular disease  - 7/13 Hepatitis panel negative, 7/15 CMV negative  - 7/16 Hematology consulted c/f sickle hepatopathy and possible need for RBCex, no urgent RBCex yet, rec hydration, repeat Hg S, agree with MRCP-->no plans for RBCex at this time, cont IVF and monitor labs; signed off 7/19  - s/p 1/2NS (7/16-7/20)  - s/p Unasyn for empiric coverage (7/17-7/19)   - 7/17 MRCP showing interval development of mild intrahepatic and extrahepatic biliary ductal dilation with the common bile duct, filling defect at the ampulla of Vater c/w choledocholithiasis  - 7/17 GI consulted, s/p ERCP 7/18 for biliary sphincterotomy and sludge sweep  and stones removal, rec monitor for downtrend of LFTs, monitor for post-ERCP pancreatitis, and rec consult surgical services for cholecystomy   - 7/19 ACS consulted for eval for inpatient CCY; consider internal cholecystectomy possibly early next week   - ongoing monitoring of bili and direct bili; all labs downtrending   - per GI possible plan for TJ biopsy if bili is around 10     # Hgb SS with acute on chronic pain  - OARRS reviewed, no aberrancies  - No current care path  - Most recent urgent RBC exchange Feb 2024 for Acute Chest Syndrome  - Hgb baseline ~ 7-8; s/p x2u PRBC per Dr. Cruz for ERCP procedure  - LDH baseline fluctuates but ~500  - Pt with CP and SOB, however these are all consistent with pt's baseline presentation of sickle cell crisis. In the absence of imaging findings c/f infection or ACS, no wheezing, and no fevers-- low c/f ACS at this time, however will closely monitor  - CXR (7/8) without evidence of acute process; 7/11 repeat CXR unchanged from prior   - Troponin (7/8): 12  - EKG (7/8): NSR with PACs, no T wave inversion,   - s/p IVP dilaudid q4 PRN (7/17--7/21)  - transitioned to rotating PO and IVP 7/21    - S/p IV Toradol 30mg q6hrs x3 days (7/8-7/11) with Protonix for PPI prophy  - Supportive care: lidocaine patch, voltaren gel x4/day, hot packs   - Continue home folic acid 1mg daily  - Utox (7/8): prelim + cannabinoid  - Hgb S (7/8) 74.2%, 7/16 Hg S 71.5%   - PO Zofran and compazine PRN for opioid-induced nausea, PO Benadryl PRN for opioid-induced pruritus, bowel regimen for opioid-induced constipation with DocuSenna 2tabs BID and Miralax daily (LBM 7/18)  - 7/16 Exchange labs sent/resulted and HELLEN negative     # Nodular lymphocyte predominant Hodgkins lymphoma (NLPHL)   - Primary Oncologist: Dr. Stoll - saw pt at bedside 7/17   - Enlarged lymph nodes noted 4/1/22  - RUQ US (11/14/22) with mildly enlarged LNs in the region of the kavin hepatis  - MRI liver (11/16/22) showed  re-demonstration of bulky retroperitoneal lymphadenopathy and kavin hepatic lymphadenopathy    - (11/18/22) lymph node biopsy showed atypical lymphoid infiltrate. Reviewed by Hemepath board, insufficient for lymphoma diagnosis  - PET/CT (12/6/22) showing retroperitoneal lymphadenopathy  - Followed up with Dr. Stoll (12/16/22) with plan for surg/onc consult for large tissue bx but patient missed apt and was lost to follow up  - Requested new apt with Dr. Ronnie Marte 6/19/23, patient was not seen and lost to follow up  - CT A/P (11/28/23) increased size of retroperitoneal lymph nodes reflecting extramedullary hematopoiesis I/s/o sickle cell vs lymphoma  - Paraaortic LN bx via IR (11/30/23) consistent with NLPHL. Flow: no clonal B cell or T cell population identified, lymphocyte 95%, CD3+CD4+ 68%, CD3+CD8+ 7%, CD19+ 24%  - Elevated LDH/bili partially from sickle cell disease   - Chemotherapy (R-CHOP) was discussed with primary oncologist Dr. Stoll, and decision was made to simplify his chemotherapy to Rituxan and prednisone q3 weeks mainly due to frequent sickle cell crisis  - Current chemo regimen: rituxan and prednisone q3 weeks (C1 1/18/24, C2 2/8/24, Missed C3 d/t sickle cell pain crisis, C4 4/4/24, C5 5/16/24, C6 6/7/24)  - Per pt no longer taking Acyclovir 400mg BID prophy   - Planned for re-staging PET CT 7/25     # Hx Priapism  - Last episode 6/18; pt presented to the ED with Priapism. Urology consulted at that time and unable to aspirate blood, however rec no acute further interventions necessary as there was improvement with conservative management  - No active issues with priapism currently  - Can order Sudafed q4h PRN if reoccurs      # Hx of nocturnal oxygen dependence and hypoxia on room air  - Historically improves with oxygen supplementation  - Pt hypoxic on admission (SpO2 84% on RA while asleep), placed on 2L supp O2    - CXR (7/8) without evidence of acute process; pt additionally denies SOB  -  Wean as able, encourage incentive spirometry  - 6/2024 failed x1 walking pulse ox, 6/25 pt was on RA however failed walking pulse ox and was sent home on 2L continuous O2   - Pulm FUV 8/5     # DVT prophy  - Lovenox subcutaneous, SCDs, encourage ambulation     # dispo   - Full code, confirmed on admit  - DC home resumed O2 pending improvement in pain, LFTs, and final GI/Heme recs  - NOK: Melissa Narayan (mother) [phone] - updated bedside 7/19   - FUV PET/CT + Dr. Stoll 7/25, Pulm 8/8, Sickle Cell FUV requested/pending       I spent >60 minutes in the professional and overall care of this patient.      Mary Moody, APRN-CNP

## 2024-07-23 VITALS
HEART RATE: 67 BPM | BODY MASS INDEX: 20.53 KG/M2 | OXYGEN SATURATION: 94 % | RESPIRATION RATE: 18 BRPM | WEIGHT: 160 LBS | TEMPERATURE: 98.6 F | SYSTOLIC BLOOD PRESSURE: 132 MMHG | DIASTOLIC BLOOD PRESSURE: 69 MMHG | HEIGHT: 74 IN

## 2024-07-23 LAB
ABO GROUP (TYPE) IN BLOOD: NORMAL
ALBUMIN SERPL BCP-MCNC: 4.4 G/DL (ref 3.4–5)
ALP SERPL-CCNC: 165 U/L (ref 33–120)
ALT SERPL W P-5'-P-CCNC: 88 U/L (ref 10–52)
ANION GAP SERPL CALC-SCNC: 16 MMOL/L (ref 10–20)
ANTIBODY SCREEN: NORMAL
AST SERPL W P-5'-P-CCNC: 130 U/L (ref 9–39)
BASOPHILS # BLD AUTO: 0.05 X10*3/UL (ref 0–0.1)
BASOPHILS NFR BLD AUTO: 0.5 %
BILIRUB DIRECT SERPL-MCNC: 8.5 MG/DL (ref 0–0.3)
BILIRUB SERPL-MCNC: 21.1 MG/DL (ref 0–1.2)
BUN SERPL-MCNC: 9 MG/DL (ref 6–23)
CALCIUM SERPL-MCNC: 9.5 MG/DL (ref 8.6–10.6)
CHLORIDE SERPL-SCNC: 100 MMOL/L (ref 98–107)
CO2 SERPL-SCNC: 24 MMOL/L (ref 21–32)
CREAT SERPL-MCNC: 0.59 MG/DL (ref 0.5–1.3)
EGFRCR SERPLBLD CKD-EPI 2021: >90 ML/MIN/1.73M*2
EOSINOPHIL # BLD AUTO: 0.42 X10*3/UL (ref 0–0.7)
EOSINOPHIL NFR BLD AUTO: 4.2 %
ERYTHROCYTE [DISTWIDTH] IN BLOOD BY AUTOMATED COUNT: 19 % (ref 11.5–14.5)
GLUCOSE SERPL-MCNC: 61 MG/DL (ref 74–99)
HCT VFR BLD AUTO: 26.8 % (ref 41–52)
HGB BLD-MCNC: 9.4 G/DL (ref 13.5–17.5)
HGB RETIC QN: 30 PG (ref 28–38)
IMM GRANULOCYTES # BLD AUTO: 0.16 X10*3/UL (ref 0–0.7)
IMM GRANULOCYTES NFR BLD AUTO: 1.6 % (ref 0–0.9)
IMMATURE RETIC FRACTION: 12.8 %
LDH SERPL L TO P-CCNC: 663 U/L (ref 84–246)
LYMPHOCYTES # BLD AUTO: 1.58 X10*3/UL (ref 1.2–4.8)
LYMPHOCYTES NFR BLD AUTO: 15.8 %
MCH RBC QN AUTO: 29.5 PG (ref 26–34)
MCHC RBC AUTO-ENTMCNC: 35.1 G/DL (ref 32–36)
MCV RBC AUTO: 84 FL (ref 80–100)
MONOCYTES # BLD AUTO: 1.31 X10*3/UL (ref 0.1–1)
MONOCYTES NFR BLD AUTO: 13.1 %
NEUTROPHILS # BLD AUTO: 6.5 X10*3/UL (ref 1.2–7.7)
NEUTROPHILS NFR BLD AUTO: 64.8 %
NRBC BLD-RTO: 2.1 /100 WBCS (ref 0–0)
PLATELET # BLD AUTO: 250 X10*3/UL (ref 150–450)
POTASSIUM SERPL-SCNC: 4.7 MMOL/L (ref 3.5–5.3)
PROT SERPL-MCNC: 6.5 G/DL (ref 6.4–8.2)
RBC # BLD AUTO: 3.19 X10*6/UL (ref 4.5–5.9)
RETICS #: 0.28 X10*6/UL (ref 0.02–0.12)
RETICS/RBC NFR AUTO: 8.8 % (ref 0.5–2)
RH FACTOR (ANTIGEN D): NORMAL
SODIUM SERPL-SCNC: 135 MMOL/L (ref 136–145)
WBC # BLD AUTO: 10 X10*3/UL (ref 4.4–11.3)

## 2024-07-23 PROCEDURE — 2500000004 HC RX 250 GENERAL PHARMACY W/ HCPCS (ALT 636 FOR OP/ED)

## 2024-07-23 PROCEDURE — 86901 BLOOD TYPING SEROLOGIC RH(D): CPT

## 2024-07-23 PROCEDURE — 36415 COLL VENOUS BLD VENIPUNCTURE: CPT

## 2024-07-23 PROCEDURE — 82248 BILIRUBIN DIRECT: CPT

## 2024-07-23 PROCEDURE — 83615 LACTATE (LD) (LDH) ENZYME: CPT | Performed by: NURSE PRACTITIONER

## 2024-07-23 PROCEDURE — 85025 COMPLETE CBC W/AUTO DIFF WBC: CPT | Performed by: NURSE PRACTITIONER

## 2024-07-23 PROCEDURE — 99239 HOSP IP/OBS DSCHRG MGMT >30: CPT

## 2024-07-23 PROCEDURE — 2500000001 HC RX 250 WO HCPCS SELF ADMINISTERED DRUGS (ALT 637 FOR MEDICARE OP): Performed by: NURSE PRACTITIONER

## 2024-07-23 PROCEDURE — 2500000001 HC RX 250 WO HCPCS SELF ADMINISTERED DRUGS (ALT 637 FOR MEDICARE OP)

## 2024-07-23 PROCEDURE — 85045 AUTOMATED RETICULOCYTE COUNT: CPT | Performed by: NURSE PRACTITIONER

## 2024-07-23 PROCEDURE — 80053 COMPREHEN METABOLIC PANEL: CPT | Performed by: NURSE PRACTITIONER

## 2024-07-23 ASSESSMENT — COGNITIVE AND FUNCTIONAL STATUS - GENERAL
DAILY ACTIVITIY SCORE: 24
MOBILITY SCORE: 24

## 2024-07-23 ASSESSMENT — PAIN - FUNCTIONAL ASSESSMENT
PAIN_FUNCTIONAL_ASSESSMENT: 0-10

## 2024-07-23 ASSESSMENT — PAIN SCALES - GENERAL
PAINLEVEL_OUTOF10: 8
PAINLEVEL_OUTOF10: 2
PAINLEVEL_OUTOF10: 0 - NO PAIN

## 2024-07-23 NOTE — PROGRESS NOTES
Gastroenterology Consult Service Progress Note  Department of Gastroenterology & Hepatology  Digestive [facility]    [facility]  July 22, 2024   Patient: Joellen Narayan    Medical Record: [id number]  Reason for Initial Consult: elevated LFTs, concern for choledocholithiasis     Interval History: LFTs continued to downtrend. Otherwise, feeling well. Tolerating diet well.   Physical Exam:    Vitals:    07/22/24 1002 07/22/24 1250 07/22/24 1729 07/22/24 2107   BP: 114/60 120/67 134/68 114/66   BP Location: Left arm Left arm Left arm Left arm   Patient Position: Lying Lying Sitting    Pulse: 66 65 66 74   Resp: 16 16 16 16   Temp: 36.6 °C (97.9 °F) 36.4 °C (97.5 °F) 37.1 °C (98.8 °F) 36.9 °C (98.4 °F)   TempSrc: Temporal Temporal Temporal    SpO2: 93% 93% 93% 95%   Weight:       Height:             Intake/Output Summary (Last 24 hours) at 7/22/2024 2126  Last data filed at 7/22/2024 2107  Gross per 24 hour   Intake --   Output 625 ml   Net -625 ml       Gen: chronically ill appearing, A+Ox3, in no acute distress  HEENT: PEERL, EOMI, sclera icteric, no conjunctival injection, MMM  Resp: CTAB, on 2L NC, normal breath sounds  CV: RRR, normal S1/S2  GI: non-tender, non-distended, normal BSs in all 4 quadrants, no rebound or guarding  MSK: warm and well perfused, no edema  Neuro: A+Ox3, no focal deficits  Skin: warm and dry, no lesions, no rashes        Labs:  Lab Results   Component Value Date    WBC 9.9 07/22/2024    HGB 9.4 (L) 07/22/2024    HCT 26.5 (L) 07/22/2024    MCV 82 07/22/2024     07/22/2024     Lab Results   Component Value Date    GLUCOSE 75 07/22/2024    CALCIUM 9.3 07/22/2024     (L) 07/22/2024    K 4.1 07/22/2024    CO2 24 07/22/2024     07/22/2024    BUN 8 07/22/2024    CREATININE 0.59 07/22/2024     Lab Results   Component Value Date    IRON 183 (H) 07/20/2024    TIBC 329 07/20/2024    FERRITIN 625 (H) 07/20/2024     Lab Results   Component Value  "Date    INR 1.2 (H) 07/16/2024    INR 1.4 (H) 06/18/2024    INR 1.5 (H) 02/17/2024    PROTIME 13.6 (H) 07/16/2024    PROTIME 15.7 (H) 06/18/2024    PROTIME 17.2 (H) 02/17/2024       Imaging:  RUQ US with doppler 7/14:     - Impression -  1. Hepatomegaly with slightly increased hepatic parenchymal  echogenicity, which may be secondary to mild diffuse fatty  infiltration versus hepatocellular disease.  2. Patent hepatic vasculature.  3. Cholelithiasis without sonographic evidence of acute cholecystitis.  4. Prominent peripancreatic and perihepatic lymph nodes, which were  also noted on 06/10/2024 CT..        MRI/MRCP w/wo 7/17:  MR MRCP WITH PANCREAS WO AND W CONTRAST (Preliminary)  This result has not been signed. Information might be incomplete.     - Impression -  1.  Interval development of mild intrahepatic and extrahepatic  biliary ductal dilation with the common bile duct measuring up to 1.0  cm. Filling defect measuring 0.3 cm at the ampulla of Vater is  compatible with choledocholithiasis. Gastroenterological or surgical  consultation is recommended.  2. Diffusion restricting hypoenhancing lesion within the superior  pole of the right kidney measuring 1.0 cm is favored to represent and  acute renal infarct given patient's history; however, given  nonvisualization on arterial phase imaging consider follow-up with CT  or MRI.  3. Interval development of hepatic iron overload. Additional sequela  of sickle cell disease as described above.  4. Cholelithiasis without evidence of cholecystitis.     GI procedures:   ERCP 7/18:  Findings  The major papilla was native. The major papilla had a normal appearance. The common bile duct was deeply cannulated after 1 attempt using a traction sphincterotome with 450 cm x 0.035\" straight guidewire. Cannulation was not difficult and no bleeding was observed. An initial contrast injection was performed successfully in the common bile duct. The injection extended throughout the " biliary tree except the cystic duct. Ductal flow was adequate. Additional filling defects were observed. The study's image quality was acceptable. Filling defect consistent with sludge was visualized in the distal common bile duct. Complete 10 mm biliary sphincterotomy was performed using a sphincterotome. No bleeding was noted at the procedure site. Multiple sweeps were performed in the common bile duct using a 8 mm balloon. Sludge and stones were removed, achieving complete clearance. Bile was noted to be draining at the end of the procedure. The pancreatic duct was not cannulated.      Assessment and Plan:        Joellen Narayan is a 23 y.o. male with Sickle cell disease (complicated by acute chest in feb 2024), Hodgkin's lymphoma (nodular lymphocyte predominant, on retux/steroids), initially admitted with acute on choric sickle cell pain.  Gastroenterology is consulted for up trending LFTs and concern for choledocholithiasis.      Patient has chronically elevated Bili, typically indirect hyperbilirubinemia 2/2 hemolysis. This admission, however, as well as once in 2022, pt has evidence of worsening indirect hyperbilirubinemia. 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 Has never had liver biopsy. Degree of this current hyperbilirubinemia likely reflects additional biliary obstruction. Concern for choledocholithiasis based on MRCP. Pt underwent ERCP on 7/18 with multiple stones and sludge, however stones were very small. Pt is s/p sphincterotomy with stone and sludge removal. There was no evidence of any extrinsic compression on cholangiogram. We are concerned with this degree of bili elevation is likely not only 2/2 biliary obstruction. Will recommend workup for chronic liver disease as below. This was reviewed with liver transplant attending, Dr. Gomez. We have already ruled out  Budd chiari with imaging form earlier this week. If there is no downtrend of bili to near normal for patient (10-15) would recommend liver biopsy to rule out VOD, vanishing duct syndrome, etc.      CLD workup: HBsAb negative (7/13/2024), HBsAg NR (6/24/2024), HBcAb total 7/13/2024. HBcAb IgM NR (7/14/2024), HA Ab IGM NR (7/14/2024), HCV Ab NR 7/13/2024. HIV NR 2022. TOMAS negative (7/17/2024), ASMA (11/16/2022), A1AT phenotype M1M1 11/16/2022. Ceruloplasmin 38 (11/16/2022). Last set of iron labs from 2021.      #mixed marked hyperbilirubinemia:  #choledocholithiasis, s/p sphincterotomy   - please check D bili along with CMP daily  - appreciate surgical service for CCY   - Okay to advance diet to regular  - Please reach out to hepatology if patient does not have downtrend of Bili to close to  baseline (10-15) for consideration of liver biopsy and/or further workup      Patient seen and discussed with attending, Dr. Stafford.      Ping Reno, GI fellow    Thank you for the consultation.  The consulting team will sign off now.  Please do not hesitate to contact us again on by paging the consultation team again between the weekday hours of 7 AM - 5 PM.  If there is an urgent concern during the weekend, after-hours, or holidays; then please page the on-call GI fellow at [phone]. Thank you.    SIGNATURE: Ping Reno MD PATIENT NAME: Joellen Narayan   DATE: July 22, 2024 MRN: [id number]

## 2024-07-23 NOTE — HOSPITAL COURSE
Joellen Narayan is a 23 y.o. 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 On IV dilaudid for pain management (7/8- current). 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 7/17 s/p x2u pRBC per Dr. Cruz to optimize for ERCP. MRCP read c/f acute renal artery infarct, labs and physical exam WNL, no need for CTA at this time per primary attending. 7/19 ACS consulted for eval of inpatient CCY, plan for outpatient cholecystectomy once bili returns to baseline. DC home resumed O2 7/23 with outpatient Sickle Cell, General Surgery, and Oncology FU.

## 2024-07-23 NOTE — DISCHARGE SUMMARY
Discharge Diagnosis  Sickle cell crisis (Multi)      Hospital Course  Joellen Narayan is a 23 y.o. 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 On IV dilaudid for pain management (7/8- current). 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 7/17 s/p x2u pRBC per Dr. Cruz to optimize for ERCP. MRCP read c/f acute renal artery infarct, labs and physical exam WNL, no need for CTA at this time per primary attending. 7/19 [facility] consulted for eval of inpatient CCY, plan for outpatient cholecystectomy once bili returns to baseline. DC home resumed O2 7/23 with outpatient Sickle Cell, General Surgery, and Oncology FU.     On the day of discharge, the patient reported feeling well and pain was controlled. Vitals and labs were stable. On exam:  Constitutional: Awake, NAD  ENMT: mucous membranes moist, no apparent injury, no lesions seen  Head/Neck: NCAT  Respiratory/Thorax: CTAB, no wheezing  Cardiovascular: RRR, S1+S2, no murmur  Gastrointestinal: Soft, NT, nondistended, +BS  Extremities: No LE edema  Psychological: Appropriate mood and behavior  Skin: no rash noted      Patient discharged in stable condition. > 30 minutes spent on discharge planning.        Home Medications     Medication List      CONTINUE taking these medications     folic acid 1 mg tablet; Commonly known as: Folvite; Take 1 tablet (1 mg)   by mouth once daily.   oxyCODONE 15 mg immediate release tablet; Commonly known as: Roxicodone;   Take 1 tablet (15 mg) by mouth  every 6 hours if needed (Severe sickle cell   pain).     ASK your doctor about these medications     Endari 5 gram powder in packet; Generic drug: glutamine (sickle cell);   Take 15 g by mouth 2 times a day.       Outpatient Follow-Up  Future Appointments   Date Time Provider Department Center   7/25/2024 10:00 AM [facility] ADMIN ROOM PET CT 2 [facility]   7/25/2024 11:00 AM [facility] PET CT 2 [facility]   7/25/2024  1:40 PM Melissa Stoll MD [id number] Academic   8/8/2024  9:30 AM Radha Garcia MD [id number] Academic       RAAD Velazco-CNP

## 2024-07-23 NOTE — CARE PLAN
Problem: Fall/Injury  Goal: Not fall by end of shift  Outcome: Progressing  Goal: Be free from injury by end of the shift  Outcome: Progressing  Goal: Verbalize understanding of personal risk factors for fall in the hospital  Outcome: Progressing  Goal: Verbalize understanding of risk factor reduction measures to prevent injury from fall in the home  Outcome: Progressing  Goal: Use assistive devices by end of the shift  Outcome: Progressing  Goal: Pace activities to prevent fatigue by end of the shift  Outcome: Progressing   The patient's goals for the shift include Get at least 6-8 hrs of sleep/rest during shift.    The clinical goals for the shift include patient will have adequate pain control ad rate pain less than 8/10 this shift 7/23/24 0700    Over the shift, the patient did make progress toward the following goals. Pt inquiring re: discharge plan.

## 2024-07-23 NOTE — CARE PLAN
The patient's goals for the shift include Get at least 6-8 hrs of sleep/rest during shift.    The clinical goals for the shift include patient will have adequate pain control ad rate pain less than 8/10 this shift 7/23/24 0700      Problem: Fall/Injury  Goal: Not fall by end of shift  Outcome: Progressing  Goal: Be free from injury by end of the shift  Outcome: Progressing  Goal: Verbalize understanding of personal risk factors for fall in the hospital  Outcome: Progressing  Goal: Verbalize understanding of risk factor reduction measures to prevent injury from fall in the home  Outcome: Progressing  Goal: Use assistive devices by end of the shift  Outcome: Progressing  Goal: Pace activities to prevent fatigue by end of the shift  Outcome: Progressing     Problem: Pain  Goal: Takes deep breaths with improved pain control throughout the shift  Outcome: Progressing  Goal: Turns in bed with improved pain control throughout the shift  Outcome: Progressing  Goal: Walks with improved pain control throughout the shift  Outcome: Progressing  Goal: Performs ADL's with improved pain control throughout shift  Outcome: Progressing  Goal: Participates in PT with improved pain control throughout the shift  Outcome: Progressing  Goal: Free from opioid side effects throughout the shift  Outcome: Progressing  Goal: Free from acute confusion related to pain meds throughout the shift  Outcome: Progressing

## 2024-07-24 ENCOUNTER — OFFICE VISIT (OUTPATIENT)
Dept: HEMATOLOGY/ONCOLOGY | Facility: HOSPITAL | Age: 24
End: 2024-07-24
Payer: COMMERCIAL

## 2024-07-24 ENCOUNTER — TELEPHONE (OUTPATIENT)
Dept: HEMATOLOGY/ONCOLOGY | Facility: HOSPITAL | Age: 24
End: 2024-07-24

## 2024-07-24 ENCOUNTER — LAB (OUTPATIENT)
Dept: LAB | Facility: HOSPITAL | Age: 24
End: 2024-07-24
Payer: COMMERCIAL

## 2024-07-24 ENCOUNTER — PHARMACY VISIT (OUTPATIENT)
Dept: PHARMACY | Facility: CLINIC | Age: 24
End: 2024-07-24
Payer: MEDICARE

## 2024-07-24 VITALS
TEMPERATURE: 98.6 F | RESPIRATION RATE: 15 BRPM | HEART RATE: 71 BPM | SYSTOLIC BLOOD PRESSURE: 125 MMHG | DIASTOLIC BLOOD PRESSURE: 63 MMHG | WEIGHT: 141.98 LBS | OXYGEN SATURATION: 96 % | BODY MASS INDEX: 18.23 KG/M2

## 2024-07-24 DIAGNOSIS — D57.00 SICKLE-CELL DISEASE WITH PAIN (MULTI): ICD-10-CM

## 2024-07-24 DIAGNOSIS — G47.34 NOCTURNAL HYPOXIA: ICD-10-CM

## 2024-07-24 DIAGNOSIS — C81.03 NODULAR LYMPHOCYTE PREDOMINANT HODGKIN LYMPHOMA OF INTRA-ABDOMINAL LYMPH NODES (MULTI): ICD-10-CM

## 2024-07-24 DIAGNOSIS — R52 UNCONTROLLED PAIN: ICD-10-CM

## 2024-07-24 DIAGNOSIS — D57.00 SICKLE-CELL DISEASE WITH PAIN (MULTI): Primary | ICD-10-CM

## 2024-07-24 DIAGNOSIS — K80.50 CHOLEDOCHOLITHIASIS: ICD-10-CM

## 2024-07-24 DIAGNOSIS — N48.30 PRIAPISM: Primary | ICD-10-CM

## 2024-07-24 LAB
ALBUMIN SERPL BCP-MCNC: 4.8 G/DL (ref 3.4–5)
ALP SERPL-CCNC: 180 U/L (ref 33–120)
ALT SERPL W P-5'-P-CCNC: 94 U/L (ref 10–52)
ANION GAP SERPL CALC-SCNC: 13 MMOL/L (ref 10–20)
AST SERPL W P-5'-P-CCNC: 122 U/L (ref 9–39)
BASOPHILS # BLD AUTO: 0.05 X10*3/UL (ref 0–0.1)
BASOPHILS NFR BLD AUTO: 0.5 %
BILIRUB DIRECT SERPL-MCNC: 8.3 MG/DL (ref 0–0.3)
BILIRUB SERPL-MCNC: 20.6 MG/DL (ref 0–1.2)
BUN SERPL-MCNC: 6 MG/DL (ref 6–23)
CALCIUM SERPL-MCNC: 9.8 MG/DL (ref 8.6–10.3)
CHLORIDE SERPL-SCNC: 101 MMOL/L (ref 98–107)
CO2 SERPL-SCNC: 26 MMOL/L (ref 21–32)
CREAT SERPL-MCNC: 0.65 MG/DL (ref 0.5–1.3)
DACRYOCYTES BLD QL SMEAR: NORMAL
EGFRCR SERPLBLD CKD-EPI 2021: >90 ML/MIN/1.73M*2
EOSINOPHIL # BLD AUTO: 0.14 X10*3/UL (ref 0–0.7)
EOSINOPHIL NFR BLD AUTO: 1.5 %
ERYTHROCYTE [DISTWIDTH] IN BLOOD BY AUTOMATED COUNT: 20.5 % (ref 11.5–14.5)
GLUCOSE SERPL-MCNC: 105 MG/DL (ref 74–99)
HCT VFR BLD AUTO: 28.8 % (ref 41–52)
HGB BLD-MCNC: 10.4 G/DL (ref 13.5–17.5)
HOWELL-JOLLY BOD BLD QL SMEAR: PRESENT
IMM GRANULOCYTES # BLD AUTO: 0.11 X10*3/UL (ref 0–0.7)
IMM GRANULOCYTES NFR BLD AUTO: 1.2 % (ref 0–0.9)
LYMPHOCYTES # BLD AUTO: 1.09 X10*3/UL (ref 1.2–4.8)
LYMPHOCYTES NFR BLD AUTO: 11.8 %
MCH RBC QN AUTO: 29.8 PG (ref 26–34)
MCHC RBC AUTO-ENTMCNC: 36.1 G/DL (ref 32–36)
MCV RBC AUTO: 83 FL (ref 80–100)
MONOCYTES # BLD AUTO: 0.96 X10*3/UL (ref 0.1–1)
MONOCYTES NFR BLD AUTO: 10.4 %
NEUTROPHILS # BLD AUTO: 6.91 X10*3/UL (ref 1.2–7.7)
NEUTROPHILS NFR BLD AUTO: 74.6 %
NRBC BLD-RTO: 2.8 /100 WBCS (ref 0–0)
OVALOCYTES BLD QL SMEAR: NORMAL
PAPPENHEIMER BOD BLD QL SMEAR: PRESENT
PHOSPHATE SERPL-MCNC: 3.5 MG/DL (ref 2.5–4.9)
PLATELET # BLD AUTO: 363 X10*3/UL (ref 150–450)
POLYCHROMASIA BLD QL SMEAR: NORMAL
POTASSIUM SERPL-SCNC: 4.2 MMOL/L (ref 3.5–5.3)
PROT SERPL-MCNC: 7 G/DL (ref 6.4–8.2)
RBC # BLD AUTO: 3.49 X10*6/UL (ref 4.5–5.9)
RBC MORPH BLD: NORMAL
SICKLE CELLS BLD QL SMEAR: NORMAL
SODIUM SERPL-SCNC: 136 MMOL/L (ref 136–145)
TARGETS BLD QL SMEAR: NORMAL
WBC # BLD AUTO: 9.3 X10*3/UL (ref 4.4–11.3)

## 2024-07-24 PROCEDURE — 84075 ASSAY ALKALINE PHOSPHATASE: CPT

## 2024-07-24 PROCEDURE — 99213 OFFICE O/P EST LOW 20 MIN: CPT | Performed by: PEDIATRICS

## 2024-07-24 PROCEDURE — 2500000005 HC RX 250 GENERAL PHARMACY W/O HCPCS

## 2024-07-24 PROCEDURE — 2500000001 HC RX 250 WO HCPCS SELF ADMINISTERED DRUGS (ALT 637 FOR MEDICARE OP)

## 2024-07-24 PROCEDURE — 99213 OFFICE O/P EST LOW 20 MIN: CPT

## 2024-07-24 PROCEDURE — 96374 THER/PROPH/DIAG INJ IV PUSH: CPT | Mod: INF

## 2024-07-24 PROCEDURE — 85025 COMPLETE CBC W/AUTO DIFF WBC: CPT

## 2024-07-24 PROCEDURE — 99203 OFFICE O/P NEW LOW 30 MIN: CPT | Performed by: PEDIATRICS

## 2024-07-24 PROCEDURE — 2500000004 HC RX 250 GENERAL PHARMACY W/ HCPCS (ALT 636 FOR OP/ED)

## 2024-07-24 PROCEDURE — 80048 BASIC METABOLIC PNL TOTAL CA: CPT

## 2024-07-24 PROCEDURE — 84100 ASSAY OF PHOSPHORUS: CPT

## 2024-07-24 PROCEDURE — 36415 COLL VENOUS BLD VENIPUNCTURE: CPT

## 2024-07-24 PROCEDURE — RXMED WILLOW AMBULATORY MEDICATION CHARGE

## 2024-07-24 RX ORDER — ONDANSETRON HYDROCHLORIDE 8 MG/1
8 TABLET, FILM COATED ORAL ONCE
Status: COMPLETED | OUTPATIENT
Start: 2024-07-24 | End: 2024-07-24

## 2024-07-24 RX ORDER — DIPHENHYDRAMINE HCL 25 MG
25 CAPSULE ORAL ONCE
Status: COMPLETED | OUTPATIENT
Start: 2024-07-24 | End: 2024-07-24

## 2024-07-24 RX ORDER — OXYCODONE HYDROCHLORIDE 15 MG/1
15 TABLET ORAL EVERY 6 HOURS PRN
Qty: 20 TABLET | Refills: 0 | Status: SHIPPED | OUTPATIENT
Start: 2024-07-24

## 2024-07-24 ASSESSMENT — PAIN DESCRIPTION - LOCATION
LOCATION: CHEST
LOCATION: CHEST

## 2024-07-24 ASSESSMENT — PAIN SCALES - GENERAL
PAINLEVEL_OUTOF10: 7
PAINLEVEL_OUTOF10: 9
PAINLEVEL: 0-NO PAIN

## 2024-07-24 ASSESSMENT — ENCOUNTER SYMPTOMS
CHILLS: 0
ABDOMINAL PAIN: 1
COUGH: 0
SHORTNESS OF BREATH: 0
LEG SWELLING: 0
MYALGIAS: 1
WOUND: 0
NAUSEA: 0
DIARRHEA: 0
FEVER: 0
VOMITING: 0
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
## 2024-07-25 ENCOUNTER — OFFICE VISIT (OUTPATIENT)
Dept: HEMATOLOGY/ONCOLOGY | Facility: HOSPITAL | Age: 24
End: 2024-07-25
Payer: COMMERCIAL

## 2024-07-25 ENCOUNTER — HOSPITAL ENCOUNTER (OUTPATIENT)
Dept: RADIOLOGY | Facility: HOSPITAL | Age: 24
Discharge: HOME | End: 2024-07-25
Payer: COMMERCIAL

## 2024-07-25 VITALS
OXYGEN SATURATION: 95 % | TEMPERATURE: 99.7 F | DIASTOLIC BLOOD PRESSURE: 43 MMHG | HEART RATE: 50 BPM | SYSTOLIC BLOOD PRESSURE: 125 MMHG | RESPIRATION RATE: 17 BRPM

## 2024-07-25 DIAGNOSIS — D57.00 SICKLE-CELL DISEASE WITH PAIN (MULTI): Primary | ICD-10-CM

## 2024-07-25 DIAGNOSIS — C81.03 NODULAR LYMPHOCYTE PREDOMINANT HODGKIN LYMPHOMA OF INTRA-ABDOMINAL LYMPH NODES (MULTI): ICD-10-CM

## 2024-07-25 LAB — GLUCOSE BLD MANUAL STRIP-MCNC: 97 MG/DL (ref 74–99)

## 2024-07-25 PROCEDURE — 3430000001 HC RX 343 DIAGNOSTIC RADIOPHARMACEUTICALS

## 2024-07-25 PROCEDURE — 78815 PET IMAGE W/CT SKULL-THIGH: CPT | Mod: PET TUMOR SUBSQ TX STRATEGY | Performed by: STUDENT IN AN ORGANIZED HEALTH CARE EDUCATION/TRAINING PROGRAM

## 2024-07-25 PROCEDURE — A9552 F18 FDG: HCPCS

## 2024-07-25 PROCEDURE — 78815 PET IMAGE W/CT SKULL-THIGH: CPT | Mod: PS

## 2024-07-25 PROCEDURE — 82947 ASSAY GLUCOSE BLOOD QUANT: CPT

## 2024-07-25 PROCEDURE — 99215 OFFICE O/P EST HI 40 MIN: CPT | Performed by: INTERNAL MEDICINE

## 2024-07-25 PROCEDURE — 99215 OFFICE O/P EST HI 40 MIN: CPT | Mod: 25 | Performed by: INTERNAL MEDICINE

## 2024-07-25 RX ORDER — FLUDEOXYGLUCOSE F 18 200 MCI/ML
13.44 INJECTION, SOLUTION INTRAVENOUS
Status: COMPLETED | OUTPATIENT
Start: 2024-07-25 | End: 2024-07-25

## 2024-07-25 ASSESSMENT — PAIN SCALES - GENERAL: PAINLEVEL: 0-NO PAIN

## 2024-07-25 NOTE — LETTER
July 25, 2024     Patient: Joellen Narayan   YOB: 2000   Date of Visit: 7/25/2024       To Whom it May Concern:    Joellen Narayan was seen in my clinic on 7/25/2024. He  can return to work with no restrictions .    If you have any questions or concerns, please don't hesitate to call [phone].         Sincerely,          Melissa Stoll MD
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
## 2024-07-31 ENCOUNTER — APPOINTMENT (OUTPATIENT)
Dept: RADIOLOGY | Facility: HOSPITAL | Age: 24
End: 2024-07-31
Payer: COMMERCIAL

## 2024-08-05 ENCOUNTER — APPOINTMENT (OUTPATIENT)
Dept: PULMONOLOGY | Facility: HOSPITAL | Age: 24
End: 2024-08-05
Payer: COMMERCIAL

## 2024-08-08 ENCOUNTER — OFFICE VISIT (OUTPATIENT)
Dept: PULMONOLOGY | Facility: HOSPITAL | Age: 24
End: 2024-08-08
Payer: COMMERCIAL

## 2024-08-08 ENCOUNTER — APPOINTMENT (OUTPATIENT)
Dept: SURGERY | Facility: CLINIC | Age: 24
End: 2024-08-08
Payer: COMMERCIAL

## 2024-08-08 VITALS
BODY MASS INDEX: 19.52 KG/M2 | TEMPERATURE: 98.2 F | WEIGHT: 152 LBS | OXYGEN SATURATION: 92 % | HEART RATE: 78 BPM | DIASTOLIC BLOOD PRESSURE: 79 MMHG | SYSTOLIC BLOOD PRESSURE: 119 MMHG

## 2024-08-08 VITALS
HEART RATE: 80 BPM | RESPIRATION RATE: 16 BRPM | BODY MASS INDEX: 19.51 KG/M2 | HEIGHT: 74 IN | SYSTOLIC BLOOD PRESSURE: 131 MMHG | WEIGHT: 152 LBS | DIASTOLIC BLOOD PRESSURE: 77 MMHG

## 2024-08-08 DIAGNOSIS — D57.1 SICKLE CELL DISEASE WITHOUT CRISIS (MULTI): ICD-10-CM

## 2024-08-08 DIAGNOSIS — K80.50 CHOLEDOCHOLITHIASIS: Primary | ICD-10-CM

## 2024-08-08 DIAGNOSIS — I27.21 PULMONARY ARTERIAL HYPERTENSION (MULTI): Primary | ICD-10-CM

## 2024-08-08 DIAGNOSIS — R09.02 HYPOXIA: ICD-10-CM

## 2024-08-08 PROCEDURE — 4004F PT TOBACCO SCREEN RCVD TLK: CPT | Performed by: STUDENT IN AN ORGANIZED HEALTH CARE EDUCATION/TRAINING PROGRAM

## 2024-08-08 PROCEDURE — 4004F PT TOBACCO SCREEN RCVD TLK: CPT | Performed by: SURGERY

## 2024-08-08 PROCEDURE — 99204 OFFICE O/P NEW MOD 45 MIN: CPT | Performed by: STUDENT IN AN ORGANIZED HEALTH CARE EDUCATION/TRAINING PROGRAM

## 2024-08-08 PROCEDURE — 99214 OFFICE O/P EST MOD 30 MIN: CPT | Performed by: SURGERY

## 2024-08-08 PROCEDURE — 3008F BODY MASS INDEX DOCD: CPT | Performed by: SURGERY

## 2024-08-08 PROCEDURE — 99214 OFFICE O/P EST MOD 30 MIN: CPT | Mod: GC | Performed by: STUDENT IN AN ORGANIZED HEALTH CARE EDUCATION/TRAINING PROGRAM

## 2024-08-08 ASSESSMENT — ENCOUNTER SYMPTOMS
ABDOMINAL PAIN: 0
ACTIVITY CHANGE: 0
HEADACHES: 0
COUGH: 0
SHORTNESS OF BREATH: 0
ABDOMINAL PAIN: 1
DIFFICULTY URINATING: 0
CONFUSION: 0
EYE DISCHARGE: 0
JOINT SWELLING: 0

## 2024-08-08 ASSESSMENT — PAIN SCALES - GENERAL
PAINLEVEL: 0-NO PAIN
PAINLEVEL: 0-NO PAIN

## 2024-08-08 ASSESSMENT — LIFESTYLE VARIABLES: HOW OFTEN DO YOU HAVE A DRINK CONTAINING ALCOHOL: NEVER
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
## 2024-08-08 NOTE — LETTER
To whomsoever this may concern,     Mr. Joellen Narayan is a patient of mine who was seen at my clinic at [facility]. He was accompanied by his mother, Ms. Narayan during the visit. Please excuse her from her duties at work for the duration of today as deemed appropriate.     Please feel free to call or reach out to me if there are any questions.     Regards,   Radha Garcia MD  Pulmonary and Critical Care Medicine  [facility]

## 2024-08-08 NOTE — PATIENT INSTRUCTIONS
Thank you for coming into the clinic today. It was a pleasure to see you!     Today we discussed the following:   - Your need for oxygen at night and your normal but low oxygen saturation at rest    The plan going forward is as follows:   Obtain a blood gas (blood test) and walk test- you can call to schedule the appointment  ECHO (heart ultrasound) with bubble study  See sleep medicine for further evaluation    Follow-up with other practitioners and referrals given:  - Sleep medicine    Please follow up with me in: 6 months after testing is complete    If you have any further questions feel free to call my office at [phone] (choose #2 to reach a pulmonary nurse) and please allow 1-2 business days for a response.     If you have any scheduling needs feel free to call [phone] (for breathing or walking test), [phone] (for EKG's, echocardiograms or cardiopulmonary stress test), and [phone] (for radiology tests such as CT scan, MRIs and nuclear medicine tests).    Radha Garcia  Pulmonary and Critical Care Fellow     [address] [place] [address]
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
## 2024-08-08 NOTE — LETTER
To whomsoever this may concern,     Mr. Joellen Narayan is a patient of mine who was seen in my clinic at [facility]. Please excuse him from his duties at work for the duration of today as deemed appropriate. He is able to return to work on Friday, 08/09/2024.    Please feel free to call or reach out to me if there are any questions.     Regards,   Radha Gracia MD  Pulmonary and Critical Care Medicine  [facility]

## 2024-08-08 NOTE — LETTER
To whomsoever this may concern,     Mr. Joellen Narayan is a patient of mine who was seen in my clinic at [facility]. Please excuse him from his duties at work for the duration of today as deemed appropriate.     Please feel free to call or reach out to me if there are any questions.     Regards,   Radha Garcia MD  Pulmonary and Critical Care Medicine  [facility]

## 2024-08-08 NOTE — LETTER
To whomsoever this may concern,     Mr. Joellen Narayan is a patient of mine who was seen in my clinic at [facility]. He was accompanied by his mother, Ms. Narayan during his clinic visit. She is able to return to work on Friday, 08/09/2024.     Please feel free to call or reach out to me if there are any questions.     Regards,   Radha Garcia MD  Pulmonary and Critical Care Medicine  [facility]

## 2024-08-09 ENCOUNTER — APPOINTMENT (OUTPATIENT)
Dept: SURGERY | Facility: CLINIC | Age: 24
End: 2024-08-09
Payer: COMMERCIAL

## 2024-08-11 ENCOUNTER — CLINICAL SUPPORT (OUTPATIENT)
Dept: EMERGENCY MEDICINE | Facility: HOSPITAL | Age: 24
End: 2024-08-11
Payer: COMMERCIAL

## 2024-08-11 ENCOUNTER — HOSPITAL ENCOUNTER (OUTPATIENT)
Facility: HOSPITAL | Age: 24
Setting detail: OBSERVATION
Discharge: HOME | End: 2024-08-11
Attending: STUDENT IN AN ORGANIZED HEALTH CARE EDUCATION/TRAINING PROGRAM | Admitting: NURSE PRACTITIONER
Payer: COMMERCIAL

## 2024-08-11 ENCOUNTER — APPOINTMENT (OUTPATIENT)
Dept: RADIOLOGY | Facility: HOSPITAL | Age: 24
End: 2024-08-11
Payer: COMMERCIAL

## 2024-08-11 VITALS
TEMPERATURE: 98.2 F | WEIGHT: 153 LBS | SYSTOLIC BLOOD PRESSURE: 120 MMHG | BODY MASS INDEX: 19.64 KG/M2 | DIASTOLIC BLOOD PRESSURE: 86 MMHG | RESPIRATION RATE: 18 BRPM | HEART RATE: 78 BPM | OXYGEN SATURATION: 97 % | HEIGHT: 74 IN

## 2024-08-11 DIAGNOSIS — I51.7 LVH (LEFT VENTRICULAR HYPERTROPHY): ICD-10-CM

## 2024-08-11 DIAGNOSIS — D57.00 SICKLE CELL DISEASE WITH CRISIS (MULTI): ICD-10-CM

## 2024-08-11 DIAGNOSIS — N48.30 PRIAPISM: Primary | ICD-10-CM

## 2024-08-11 DIAGNOSIS — D57.00 SICKLE-CELL DISEASE WITH PAIN (MULTI): ICD-10-CM

## 2024-08-11 LAB
ABO GROUP (TYPE) IN BLOOD: NORMAL
ABO GROUP (TYPE) IN BLOOD: NORMAL
ALBUMIN SERPL BCP-MCNC: 4.2 G/DL (ref 3.4–5)
ALBUMIN SERPL BCP-MCNC: 4.3 G/DL (ref 3.4–5)
ALP SERPL-CCNC: 112 U/L (ref 33–120)
ALP SERPL-CCNC: 121 U/L (ref 33–120)
ALT SERPL W P-5'-P-CCNC: 25 U/L (ref 10–52)
ALT SERPL W P-5'-P-CCNC: 26 U/L (ref 10–52)
ANION GAP SERPL CALC-SCNC: 13 MMOL/L (ref 10–20)
ANION GAP SERPL CALC-SCNC: 13 MMOL/L (ref 10–20)
ANTIBODY SCREEN: NORMAL
ANTIBODY SCREEN: NORMAL
AST SERPL W P-5'-P-CCNC: 39 U/L (ref 9–39)
AST SERPL W P-5'-P-CCNC: 42 U/L (ref 9–39)
ATRIAL RATE: 74 BPM
BASOPHILS # BLD AUTO: 0.05 X10*3/UL (ref 0–0.1)
BASOPHILS # BLD AUTO: 0.05 X10*3/UL (ref 0–0.1)
BASOPHILS NFR BLD AUTO: 0.4 %
BASOPHILS NFR BLD AUTO: 0.5 %
BILIRUB SERPL-MCNC: 8.4 MG/DL (ref 0–1.2)
BILIRUB SERPL-MCNC: 8.7 MG/DL (ref 0–1.2)
BUN SERPL-MCNC: 5 MG/DL (ref 6–23)
BUN SERPL-MCNC: 6 MG/DL (ref 6–23)
CALCIUM SERPL-MCNC: 9 MG/DL (ref 8.6–10.6)
CALCIUM SERPL-MCNC: 9 MG/DL (ref 8.6–10.6)
CHLORIDE SERPL-SCNC: 109 MMOL/L (ref 98–107)
CHLORIDE SERPL-SCNC: 111 MMOL/L (ref 98–107)
CO2 SERPL-SCNC: 23 MMOL/L (ref 21–32)
CO2 SERPL-SCNC: 25 MMOL/L (ref 21–32)
CREAT SERPL-MCNC: 0.52 MG/DL (ref 0.5–1.3)
CREAT SERPL-MCNC: 0.59 MG/DL (ref 0.5–1.3)
EGFRCR SERPLBLD CKD-EPI 2021: >90 ML/MIN/1.73M*2
EGFRCR SERPLBLD CKD-EPI 2021: >90 ML/MIN/1.73M*2
EOSINOPHIL # BLD AUTO: 0.1 X10*3/UL (ref 0–0.7)
EOSINOPHIL # BLD AUTO: 0.12 X10*3/UL (ref 0–0.7)
EOSINOPHIL NFR BLD AUTO: 0.9 %
EOSINOPHIL NFR BLD AUTO: 1 %
ERYTHROCYTE [DISTWIDTH] IN BLOOD BY AUTOMATED COUNT: 19.3 % (ref 11.5–14.5)
ERYTHROCYTE [DISTWIDTH] IN BLOOD BY AUTOMATED COUNT: 19.4 % (ref 11.5–14.5)
GLUCOSE SERPL-MCNC: 89 MG/DL (ref 74–99)
GLUCOSE SERPL-MCNC: 93 MG/DL (ref 74–99)
HCT VFR BLD AUTO: 20.6 % (ref 41–52)
HCT VFR BLD AUTO: 22 % (ref 41–52)
HGB BLD-MCNC: 7.8 G/DL (ref 13.5–17.5)
HGB BLD-MCNC: 8.3 G/DL (ref 13.5–17.5)
HGB RETIC QN: 29 PG (ref 28–38)
HGB RETIC QN: 30 PG (ref 28–38)
HOWELL-JOLLY BOD BLD QL SMEAR: PRESENT
IMM GRANULOCYTES # BLD AUTO: 0.07 X10*3/UL (ref 0–0.7)
IMM GRANULOCYTES # BLD AUTO: 0.11 X10*3/UL (ref 0–0.7)
IMM GRANULOCYTES NFR BLD AUTO: 0.6 % (ref 0–0.9)
IMM GRANULOCYTES NFR BLD AUTO: 0.9 % (ref 0–0.9)
IMMATURE RETIC FRACTION: 13.8 %
IMMATURE RETIC FRACTION: 15 %
INR PPP: 1.2 (ref 0.9–1.1)
LDH SERPL L TO P-CCNC: 361 U/L (ref 84–246)
LYMPHOCYTES # BLD AUTO: 1.26 X10*3/UL (ref 1.2–4.8)
LYMPHOCYTES # BLD AUTO: 1.9 X10*3/UL (ref 1.2–4.8)
LYMPHOCYTES NFR BLD AUTO: 10.8 %
LYMPHOCYTES NFR BLD AUTO: 17.5 %
MCH RBC QN AUTO: 30.4 PG (ref 26–34)
MCH RBC QN AUTO: 30.4 PG (ref 26–34)
MCHC RBC AUTO-ENTMCNC: 37.7 G/DL (ref 32–36)
MCHC RBC AUTO-ENTMCNC: 37.9 G/DL (ref 32–36)
MCV RBC AUTO: 80 FL (ref 80–100)
MCV RBC AUTO: 81 FL (ref 80–100)
MONOCYTES # BLD AUTO: 1.08 X10*3/UL (ref 0.1–1)
MONOCYTES # BLD AUTO: 1.39 X10*3/UL (ref 0.1–1)
MONOCYTES NFR BLD AUTO: 10 %
MONOCYTES NFR BLD AUTO: 12 %
NEUTROPHILS # BLD AUTO: 7.63 X10*3/UL (ref 1.2–7.7)
NEUTROPHILS # BLD AUTO: 8.7 X10*3/UL (ref 1.2–7.7)
NEUTROPHILS NFR BLD AUTO: 70.5 %
NEUTROPHILS NFR BLD AUTO: 74.9 %
NRBC BLD-RTO: 1.2 /100 WBCS (ref 0–0)
NRBC BLD-RTO: 1.3 /100 WBCS (ref 0–0)
P AXIS: 54 DEGREES
P OFFSET: 158 MS
P ONSET: 122 MS
PAPPENHEIMER BOD BLD QL SMEAR: PRESENT
PLATELET # BLD AUTO: 366 X10*3/UL (ref 150–450)
PLATELET # BLD AUTO: 400 X10*3/UL (ref 150–450)
POLYCHROMASIA BLD QL SMEAR: NORMAL
POTASSIUM SERPL-SCNC: 3.5 MMOL/L (ref 3.5–5.3)
POTASSIUM SERPL-SCNC: 3.8 MMOL/L (ref 3.5–5.3)
PR INTERVAL: 190 MS
PROT SERPL-MCNC: 6 G/DL (ref 6.4–8.2)
PROT SERPL-MCNC: 6.5 G/DL (ref 6.4–8.2)
PROTHROMBIN TIME: 13.9 SECONDS (ref 9.8–12.8)
Q ONSET: 217 MS
QRS COUNT: 12 BEATS
QRS DURATION: 106 MS
QT INTERVAL: 366 MS
QTC CALCULATION(BAZETT): 406 MS
QTC FREDERICIA: 392 MS
R AXIS: 85 DEGREES
RBC # BLD AUTO: 2.57 X10*6/UL (ref 4.5–5.9)
RBC # BLD AUTO: 2.73 X10*6/UL (ref 4.5–5.9)
RBC MORPH BLD: NORMAL
RBC MORPH BLD: NORMAL
RETICS #: 0.63 X10*6/UL (ref 0.02–0.12)
RETICS #: 0.64 X10*6/UL (ref 0.02–0.12)
RETICS/RBC NFR AUTO: 22.7 % (ref 0.5–2)
RETICS/RBC NFR AUTO: 25 % (ref 0.5–2)
RH FACTOR (ANTIGEN D): NORMAL
RH FACTOR (ANTIGEN D): NORMAL
SCHISTOCYTES BLD QL SMEAR: NORMAL
SICKLE CELLS BLD QL SMEAR: NORMAL
SICKLE CELLS BLD QL SMEAR: NORMAL
SODIUM SERPL-SCNC: 143 MMOL/L (ref 136–145)
SODIUM SERPL-SCNC: 143 MMOL/L (ref 136–145)
T AXIS: 62 DEGREES
T OFFSET: 400 MS
TARGETS BLD QL SMEAR: NORMAL
TARGETS BLD QL SMEAR: NORMAL
VENTRICULAR RATE: 74 BPM
WBC # BLD AUTO: 10.8 X10*3/UL (ref 4.4–11.3)
WBC # BLD AUTO: 11.6 X10*3/UL (ref 4.4–11.3)

## 2024-08-11 PROCEDURE — 80053 COMPREHEN METABOLIC PANEL: CPT | Performed by: NURSE PRACTITIONER

## 2024-08-11 PROCEDURE — 96372 THER/PROPH/DIAG INJ SC/IM: CPT

## 2024-08-11 PROCEDURE — G0378 HOSPITAL OBSERVATION PER HR: HCPCS

## 2024-08-11 PROCEDURE — 96361 HYDRATE IV INFUSION ADD-ON: CPT | Mod: 59

## 2024-08-11 PROCEDURE — 36415 COLL VENOUS BLD VENIPUNCTURE: CPT | Performed by: STUDENT IN AN ORGANIZED HEALTH CARE EDUCATION/TRAINING PROGRAM

## 2024-08-11 PROCEDURE — 2500000005 HC RX 250 GENERAL PHARMACY W/O HCPCS

## 2024-08-11 PROCEDURE — 85025 COMPLETE CBC W/AUTO DIFF WBC: CPT | Performed by: NURSE PRACTITIONER

## 2024-08-11 PROCEDURE — 99285 EMERGENCY DEPT VISIT HI MDM: CPT | Mod: 25

## 2024-08-11 PROCEDURE — 83021 HEMOGLOBIN CHROMOTOGRAPHY: CPT | Performed by: NURSE PRACTITIONER

## 2024-08-11 PROCEDURE — 96376 TX/PRO/DX INJ SAME DRUG ADON: CPT | Mod: 59

## 2024-08-11 PROCEDURE — 71045 X-RAY EXAM CHEST 1 VIEW: CPT

## 2024-08-11 PROCEDURE — 86901 BLOOD TYPING SEROLOGIC RH(D): CPT | Performed by: NURSE PRACTITIONER

## 2024-08-11 PROCEDURE — 71045 X-RAY EXAM CHEST 1 VIEW: CPT | Performed by: STUDENT IN AN ORGANIZED HEALTH CARE EDUCATION/TRAINING PROGRAM

## 2024-08-11 PROCEDURE — 85610 PROTHROMBIN TIME: CPT | Performed by: STUDENT IN AN ORGANIZED HEALTH CARE EDUCATION/TRAINING PROGRAM

## 2024-08-11 PROCEDURE — 96375 TX/PRO/DX INJ NEW DRUG ADDON: CPT | Mod: 59

## 2024-08-11 PROCEDURE — 2500000004 HC RX 250 GENERAL PHARMACY W/ HCPCS (ALT 636 FOR OP/ED)

## 2024-08-11 PROCEDURE — 96374 THER/PROPH/DIAG INJ IV PUSH: CPT | Mod: 59

## 2024-08-11 PROCEDURE — 2500000001 HC RX 250 WO HCPCS SELF ADMINISTERED DRUGS (ALT 637 FOR MEDICARE OP): Performed by: STUDENT IN AN ORGANIZED HEALTH CARE EDUCATION/TRAINING PROGRAM

## 2024-08-11 PROCEDURE — 54220 IRRG CRPRA CAVRNOSA PRIAPISM: CPT

## 2024-08-11 PROCEDURE — 80053 COMPREHEN METABOLIC PANEL: CPT | Performed by: STUDENT IN AN ORGANIZED HEALTH CARE EDUCATION/TRAINING PROGRAM

## 2024-08-11 PROCEDURE — 2500000004 HC RX 250 GENERAL PHARMACY W/ HCPCS (ALT 636 FOR OP/ED): Performed by: NURSE PRACTITIONER

## 2024-08-11 PROCEDURE — 85045 AUTOMATED RETICULOCYTE COUNT: CPT | Performed by: NURSE PRACTITIONER

## 2024-08-11 PROCEDURE — 83615 LACTATE (LD) (LDH) ENZYME: CPT | Performed by: NURSE PRACTITIONER

## 2024-08-11 PROCEDURE — 96365 THER/PROPH/DIAG IV INF INIT: CPT | Mod: 59

## 2024-08-11 PROCEDURE — 86901 BLOOD TYPING SEROLOGIC RH(D): CPT | Performed by: STUDENT IN AN ORGANIZED HEALTH CARE EDUCATION/TRAINING PROGRAM

## 2024-08-11 PROCEDURE — 85025 COMPLETE CBC W/AUTO DIFF WBC: CPT | Performed by: STUDENT IN AN ORGANIZED HEALTH CARE EDUCATION/TRAINING PROGRAM

## 2024-08-11 PROCEDURE — 2500000004 HC RX 250 GENERAL PHARMACY W/ HCPCS (ALT 636 FOR OP/ED): Performed by: STUDENT IN AN ORGANIZED HEALTH CARE EDUCATION/TRAINING PROGRAM

## 2024-08-11 PROCEDURE — 85045 AUTOMATED RETICULOCYTE COUNT: CPT | Performed by: STUDENT IN AN ORGANIZED HEALTH CARE EDUCATION/TRAINING PROGRAM

## 2024-08-11 PROCEDURE — 36415 COLL VENOUS BLD VENIPUNCTURE: CPT | Performed by: NURSE PRACTITIONER

## 2024-08-11 PROCEDURE — 93005 ELECTROCARDIOGRAM TRACING: CPT | Mod: 59

## 2024-08-11 RX ORDER — KETOROLAC TROMETHAMINE 15 MG/ML
15 INJECTION, SOLUTION INTRAMUSCULAR; INTRAVENOUS ONCE
Status: COMPLETED | OUTPATIENT
Start: 2024-08-11 | End: 2024-08-11

## 2024-08-11 RX ORDER — PHENYLEPHRINE HCL IN 0.9% NACL 100 MCG/10
100 SYRINGE (ML) INTRAVENOUS
Status: DISCONTINUED | OUTPATIENT
Start: 2024-08-11 | End: 2024-08-11 | Stop reason: SDUPTHER

## 2024-08-11 RX ORDER — FOLIC ACID 1 MG/1
1 TABLET ORAL DAILY
Status: DISCONTINUED | OUTPATIENT
Start: 2024-08-11 | End: 2024-08-11 | Stop reason: HOSPADM

## 2024-08-11 RX ORDER — FOLIC ACID 1 MG/1
1 TABLET ORAL DAILY
Qty: 30 TABLET | Refills: 0 | Status: SHIPPED | OUTPATIENT
Start: 2024-08-11

## 2024-08-11 RX ORDER — HYDROMORPHONE HYDROCHLORIDE 1 MG/ML
1 INJECTION, SOLUTION INTRAMUSCULAR; INTRAVENOUS; SUBCUTANEOUS ONCE
Status: COMPLETED | OUTPATIENT
Start: 2024-08-11 | End: 2024-08-11

## 2024-08-11 RX ORDER — DIPHENHYDRAMINE HYDROCHLORIDE 50 MG/ML
25 INJECTION INTRAMUSCULAR; INTRAVENOUS ONCE
Status: COMPLETED | OUTPATIENT
Start: 2024-08-11 | End: 2024-08-11

## 2024-08-11 RX ORDER — LIDOCAINE HYDROCHLORIDE 20 MG/ML
5 INJECTION, SOLUTION INFILTRATION; PERINEURAL ONCE
Status: DISCONTINUED | OUTPATIENT
Start: 2024-08-11 | End: 2024-08-11

## 2024-08-11 RX ORDER — OXYCODONE HYDROCHLORIDE 15 MG/1
15 TABLET ORAL EVERY 6 HOURS PRN
Qty: 20 TABLET | Refills: 0 | Status: SHIPPED | OUTPATIENT
Start: 2024-08-11 | End: 2024-08-16

## 2024-08-11 RX ORDER — PHENYLEPHRINE HCL IN 0.9% NACL 1 MG/10 ML
100 SYRINGE (ML) INTRAVENOUS
Status: DISCONTINUED | OUTPATIENT
Start: 2024-08-11 | End: 2024-08-11 | Stop reason: HOSPADM

## 2024-08-11 RX ORDER — PSEUDOEPHEDRINE HYDROCHLORIDE 60 MG/1
60 TABLET ORAL DAILY PRN
Status: DISCONTINUED | OUTPATIENT
Start: 2024-08-11 | End: 2024-08-11 | Stop reason: HOSPADM

## 2024-08-11 RX ORDER — DIPHENHYDRAMINE HCL 25 MG
25 CAPSULE ORAL EVERY 6 HOURS PRN
Status: DISCONTINUED | OUTPATIENT
Start: 2024-08-11 | End: 2024-08-11 | Stop reason: HOSPADM

## 2024-08-11 RX ORDER — ONDANSETRON 4 MG/1
8 TABLET, FILM COATED ORAL EVERY 8 HOURS PRN
Status: DISCONTINUED | OUTPATIENT
Start: 2024-08-11 | End: 2024-08-11 | Stop reason: HOSPADM

## 2024-08-11 RX ORDER — AMOXICILLIN 250 MG
2 CAPSULE ORAL 2 TIMES DAILY
Status: DISCONTINUED | OUTPATIENT
Start: 2024-08-11 | End: 2024-08-11 | Stop reason: HOSPADM

## 2024-08-11 RX ORDER — LIDOCAINE HYDROCHLORIDE 10 MG/ML
10 INJECTION INFILTRATION; PERINEURAL ONCE
Status: COMPLETED | OUTPATIENT
Start: 2024-08-11 | End: 2024-08-11

## 2024-08-11 RX ORDER — LIDOCAINE HYDROCHLORIDE 10 MG/ML
INJECTION INFILTRATION; PERINEURAL
Status: COMPLETED
Start: 2024-08-11 | End: 2024-08-11

## 2024-08-11 RX ORDER — POLYETHYLENE GLYCOL 3350 17 G/17G
17 POWDER, FOR SOLUTION ORAL DAILY
Status: DISCONTINUED | OUTPATIENT
Start: 2024-08-11 | End: 2024-08-11 | Stop reason: HOSPADM

## 2024-08-11 RX ORDER — ENOXAPARIN SODIUM 100 MG/ML
40 INJECTION SUBCUTANEOUS EVERY 24 HOURS
Status: DISCONTINUED | OUTPATIENT
Start: 2024-08-11 | End: 2024-08-11 | Stop reason: HOSPADM

## 2024-08-11 RX ORDER — PSEUDOEPHEDRINE HYDROCHLORIDE 60 MG/1
60 TABLET ORAL ONCE
Status: COMPLETED | OUTPATIENT
Start: 2024-08-11 | End: 2024-08-11

## 2024-08-11 RX ADMIN — SODIUM CHLORIDE, POTASSIUM CHLORIDE, SODIUM LACTATE AND CALCIUM CHLORIDE 1000 ML: 600; 310; 30; 20 INJECTION, SOLUTION INTRAVENOUS at 07:42

## 2024-08-11 RX ADMIN — SODIUM CHLORIDE 500 ML: 9 INJECTION, SOLUTION INTRAVENOUS at 10:49

## 2024-08-11 RX ADMIN — PROMETHAZINE HYDROCHLORIDE 25 MG: 25 INJECTION INTRAMUSCULAR; INTRAVENOUS at 09:00

## 2024-08-11 RX ADMIN — KETOROLAC TROMETHAMINE 15 MG: 15 INJECTION, SOLUTION INTRAMUSCULAR; INTRAVENOUS at 07:33

## 2024-08-11 RX ADMIN — DIPHENHYDRAMINE HYDROCHLORIDE 25 MG: 50 INJECTION, SOLUTION INTRAMUSCULAR; INTRAVENOUS at 08:54

## 2024-08-11 RX ADMIN — HYDROMORPHONE HYDROCHLORIDE 2 MG: 2 INJECTION, SOLUTION INTRAMUSCULAR; INTRAVENOUS; SUBCUTANEOUS at 11:39

## 2024-08-11 RX ADMIN — HYDROMORPHONE HYDROCHLORIDE 1 MG: 1 INJECTION, SOLUTION INTRAMUSCULAR; INTRAVENOUS; SUBCUTANEOUS at 09:27

## 2024-08-11 RX ADMIN — HYDROMORPHONE HYDROCHLORIDE 1 MG: 1 INJECTION, SOLUTION INTRAMUSCULAR; INTRAVENOUS; SUBCUTANEOUS at 07:33

## 2024-08-11 RX ADMIN — LIDOCAINE HYDROCHLORIDE 20 ML: 10 INJECTION INFILTRATION; PERINEURAL at 07:43

## 2024-08-11 RX ADMIN — PSEUDOEPHEDRINE HYDROCHLORIDE 60 MG: 60 TABLET ORAL at 07:33

## 2024-08-11 RX ADMIN — HYDROMORPHONE HYDROCHLORIDE 1 MG: 1 INJECTION, SOLUTION INTRAMUSCULAR; INTRAVENOUS; SUBCUTANEOUS at 08:26

## 2024-08-11 RX ADMIN — LIDOCAINE HYDROCHLORIDE 20 ML: 10 INJECTION, SOLUTION INFILTRATION; PERINEURAL at 07:43

## 2024-08-11 ASSESSMENT — LIFESTYLE VARIABLES
HAVE PEOPLE ANNOYED YOU BY CRITICIZING YOUR DRINKING: NO
EVER FELT BAD OR GUILTY ABOUT YOUR DRINKING: NO
EVER HAD A DRINK FIRST THING IN THE MORNING TO STEADY YOUR NERVES TO GET RID OF A HANGOVER: NO
HAVE YOU EVER FELT YOU SHOULD CUT DOWN ON YOUR DRINKING: NO
TOTAL SCORE: 0

## 2024-08-11 ASSESSMENT — PAIN DESCRIPTION - PAIN TYPE
TYPE: ACUTE PAIN
TYPE: ACUTE PAIN;CHRONIC PAIN

## 2024-08-11 ASSESSMENT — PAIN DESCRIPTION - DESCRIPTORS: DESCRIPTORS: ACHING

## 2024-08-11 ASSESSMENT — PAIN DESCRIPTION - PROGRESSION
CLINICAL_PROGRESSION: GRADUALLY IMPROVING
CLINICAL_PROGRESSION: GRADUALLY IMPROVING
CLINICAL_PROGRESSION: NOT CHANGED

## 2024-08-11 ASSESSMENT — PAIN DESCRIPTION - ONSET: ONSET: ONGOING

## 2024-08-11 ASSESSMENT — PAIN - FUNCTIONAL ASSESSMENT
PAIN_FUNCTIONAL_ASSESSMENT: 0-10

## 2024-08-11 ASSESSMENT — PAIN SCALES - GENERAL
PAINLEVEL_OUTOF10: 9
PAINLEVEL_OUTOF10: 5 - MODERATE PAIN
PAINLEVEL_OUTOF10: 10 - WORST POSSIBLE PAIN

## 2024-08-11 ASSESSMENT — PAIN DESCRIPTION - LOCATION
LOCATION: CHEST
LOCATION: CHEST

## 2024-08-11 ASSESSMENT — PAIN DESCRIPTION - ORIENTATION: ORIENTATION: MID

## 2024-08-11 ASSESSMENT — PAIN DESCRIPTION - FREQUENCY: FREQUENCY: CONSTANT/CONTINUOUS

## 2024-08-11 NOTE — DISCHARGE SUMMARY
Discharge Diagnosis  Sickle cell disease with crisis (Multi)    Test Results Pending At Discharge  Pending Labs       Order Current Status    HEMOGLOBIN IDENTIFICATION WITH PATH REVIEW In process    Type And Screen In process    CBC and Auto Differential Preliminary result          Hospital Course  Joellen Narayan is a 23 y.o. 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 Given 60mg sudafed x1. Aspiration of corpus cavernosum was successful with 40mL aspirated on each side. Pain in his chest is consistent with his typical sickle cell pain. EKG NSR without T wave changes. CXR negative for acute process. Admitted for management on pain. Started on IV dilaudid for uncomplicated sickle cell pain. Upon eval in ED, pt requesting dc stating that his pain is controlled and his priapism has resolved. Given there is no c/f infection and labs are consistent with pt's baseline, pt was discharged. Rx for folic acid and oxycodone (#20, 5-DS) sent to pt's home pharmacy. He was discharged in stable condition with follow up apts below. Sickle cell FUV requested/pending.    Pertinent Physical Exam At Time of Discharge  On the day of discharge, the patient reported feeling well and pain was controlled. Vitals and labs were stable. On exam:  Constitutional: Awake, NAD, cachectic   ENMT: mucous membranes moist, no apparent injury, no lesions seen  Head/Neck: NCAT  Respiratory/Thorax: CTAB, no wheezing  Cardiovascular: RRR, S1+S2, no murmur  Gastrointestinal: Soft, NT, nondistended, +BS  Extremities: No LE edema  Psychological: Appropriate  mood and behavior  Skin: no rash noted      Home Medications     Medication List      CONTINUE taking these medications     folic acid 1 mg tablet; Commonly known as: Folvite; Take 1 tablet (1 mg)   by mouth once daily.   oxyCODONE 15 mg immediate release tablet; Commonly known as: Roxicodone;   Take 1 tablet (15 mg) by mouth every 6 hours if needed (Severe sickle cell   pain) for up to 5 days.     STOP taking these medications     Endari 5 gram powder in packet; Generic drug: glutamine (sickle cell)     Outpatient Follow-Up  Future Appointments   Date Time Provider Department Center   9/19/2024  9:20 AM Melissa Stoll MD [id number] Academic   10/7/2024  8:30 AM Josiah Christiansen MD [facility]     >30 minutes was spent on the assessment/discharge plan of this patient    Jenise Jenkins, APRN-CNP

## 2024-08-11 NOTE — H&P
History Of Present Illness  Joellen Narayan is a 23 y.o. 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 Given 60mg sudafed x1. Aspiration of corpus cavernosum was successful with 40mL aspirated on each side. Pain in his chest is consistent with his typical sickle cell pain. EKG NSR without T wave changes. CXR negative for acute process. He is unsure what caused his current pain crisis. Took his home meds without relief. Denies any HA, dizziness/lightheadedness, fevers/chills, cough, congestion, rhinorrhea, sore throat, SOB, palpitations, abdominal pain, n/v/d/c, melena, dysuria, urgency/frequency, hematuria, numbness/tingling, bruising/bleeding or rashes. Denies any sick contacts. ROS otherwise negative.     ED Course:  - VSS; Tmax 36.8, HR 69, /64, RR 18, SpO2 92% on RA (known nocturnal hypoxia)  - WBC 11.6k, Hgb 8.3, Hct 22.0, Plt 400  - Tbili 8.7, Retic # 0.631  - EKG NSR without T wave changes  - CXR without evidence of acute process  - Given 60mg sudafed x1. Aspiration of corpus cavernosum was successful with 40mL aspirated on each side  - s/p 1L LR, IV dilaudid 1mg x2, IV Toradol 15mg x1  - Admitted for further management     ONC History:  # Nodular lymphocyte predominant Hodgkins lymphoma (NLPHL)   - Primary Oncologist: Dr. Stoll  - Enlarged lymph nodes noted 4/1/22  - RUQ US (11/14/22) with mildly enlarged LNs in the region of the kavin hepatis  - MRI liver (11/16/22) showed re-demonstration of bulky retroperitoneal lymphadenopathy and kavin hepatic lymphadenopathy    - (11/18/22) lymph node biopsy  showed atypical lymphoid infiltrate. Reviewed by Hemepath board, insufficient for lymphoma diagnosis  - PET/CT (12/6/22) showing retroperitoneal lymphadenopathy  - Followed up with Dr. Stoll (12/16/22) with plan for surg/onc consult for large tissue bx but patient missed apt and was lost to follow up  - Requested new apt with Dr. Ronnie Marte 6/19/23, patient was not seen and lost to follow up  - CT a/p (11/28/23) increased size of retroperitoneal lymph nodes reflecting extramedullary hematopoiesis I/s/o sickle cell vs lymphoma  - Paraaortic LN bx via IR (11/30/23) consistent with NLPHL. Flow: no clonal B cell or T cell population identified, lymphocyte 95%, CD3+CD4+ 68%, CD3+CD8+ 7%, CD19+ 24%  - Elevated LDH/bili partially from sickle cell disease   - Chemotherapy (R-CHOP) was discussed with primary oncologist Dr. Stoll, and decision was made to simplify his chemotherapy to Rituxan and prednisone q3 weeks mainly due to frequent sickle cell crisis  - Current chemo regimen: rituxan and prednisone q3 weeks (C1 1/18/24, C2 2/8/24, Missed C3 d/t sickle cell pain crisis, C4 4/4/24, C5 5/16/24, C6 6/7/24)  - Per pt no longer taking Acyclovir 400mg BID prophy  - PET CT (7/25) overall showing great response to treatment with persistent residual viable disease involving a left common iliac node  - Last FUV with Dr. Stoll 7/25/24 rec RTC in 2 months (next FUV scheduled for 9/19)     Past Medical History  He has a past medical history of Corrosion of unspecified body region, unspecified degree (12/31/2014), Impetigo (01/04/2024), Personal history of diseases of the blood and blood-forming organs and certain disorders involving the immune mechanism, Personal history of other (healed) physical injury and trauma (01/03/2015), Personal history of other diseases of the circulatory system, Personal history of other diseases of the circulatory system, Rash and other nonspecific skin eruption (09/09/2014), and Sickle-cell disease  with pain (Multi) (12/19/2023).    Surgical History  He has a past surgical history that includes IR CVC tunneled (6/21/2022); CT guided percutaneous biopsy LYMPH node superficial (11/18/2022); and CT guided percutaneous abdominal retroperitoneum biopsy (11/30/2023).    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 He has been exposed to tobacco smoke. He has never used smokeless tobacco. He reports that he does not drink alcohol and does not use drugs.     Allergies  Cefepime, Amoxicillin, and Ceftriaxone     Physical Exam  Vitals reviewed.   Constitutional:       Appearance: Normal appearance.   HENT:      Head: Normocephalic and atraumatic.      Nose: Nose normal.      Mouth/Throat:      Mouth: Mucous membranes are moist.      Pharynx: Oropharynx is clear.   Eyes:      Extraocular Movements: Extraocular movements intact.      Pupils: Pupils are equal, round, and reactive to light.   Cardiovascular:      Rate and Rhythm: Normal rate and regular rhythm.      Pulses: Normal pulses.      Heart sounds: Normal heart sounds.   Pulmonary:      Effort: Pulmonary effort is normal.      Breath sounds: Normal breath sounds.   Abdominal:      General: Bowel sounds are normal.      Palpations: Abdomen is soft.   Musculoskeletal:         General: Normal range of motion.   Skin:     General: Skin is warm.   Neurological:      General: No focal deficit present.      Mental Status: He is alert and oriented to person, place, and time. Mental status is at baseline.   Psychiatric:         Mood and Affect: Mood normal.         Behavior: Behavior normal.  "        Last Recorded Vitals  Blood pressure 116/64, pulse 69, temperature 36.8 °C (98.2 °F), temperature source Oral, resp. rate (!) 22, height 1.88 m (6' 2\"), weight 69.4 kg (153 lb), SpO2 (!) 92%.    Results for orders placed or performed during the hospital encounter of 08/11/24 (from the past 24 hour(s))   CBC and Auto Differential   Result Value Ref Range    WBC 11.6 (H) 4.4 - 11.3 x10*3/uL    nRBC 1.2 (H) 0.0 - 0.0 /100 WBCs    RBC 2.73 (L) 4.50 - 5.90 x10*6/uL    Hemoglobin 8.3 (L) 13.5 - 17.5 g/dL    Hematocrit 22.0 (L) 41.0 - 52.0 %    MCV 81 80 - 100 fL    MCH 30.4 26.0 - 34.0 pg    MCHC 37.7 (H) 32.0 - 36.0 g/dL    RDW 19.3 (H) 11.5 - 14.5 %    Platelets 400 150 - 450 x10*3/uL    Neutrophils % 74.9 40.0 - 80.0 %    Immature Granulocytes %, Automated 0.9 0.0 - 0.9 %    Lymphocytes % 10.8 13.0 - 44.0 %    Monocytes % 12.0 2.0 - 10.0 %    Eosinophils % 1.0 0.0 - 6.0 %    Basophils % 0.4 0.0 - 2.0 %    Neutrophils Absolute 8.70 (H) 1.20 - 7.70 x10*3/uL    Immature Granulocytes Absolute, Automated 0.11 0.00 - 0.70 x10*3/uL    Lymphocytes Absolute 1.26 1.20 - 4.80 x10*3/uL    Monocytes Absolute 1.39 (H) 0.10 - 1.00 x10*3/uL    Eosinophils Absolute 0.12 0.00 - 0.70 x10*3/uL    Basophils Absolute 0.05 0.00 - 0.10 x10*3/uL   Protime-INR   Result Value Ref Range    Protime 13.9 (H) 9.8 - 12.8 seconds    INR 1.2 (H) 0.9 - 1.1   Type and Screen   Result Value Ref Range    ABO TYPE B     Rh TYPE POS     ANTIBODY SCREEN NEG    Reticulocytes   Result Value Ref Range    Retic % 22.7 (H) 0.5 - 2.0 %    Retic Absolute 0.631 (H) 0.022 - 0.118 x10*6/uL    Reticulocyte Hemoglobin 30 28 - 38 pg    Immature Retic fraction 15.0 <=16.0 %   Comprehensive metabolic panel   Result Value Ref Range    Glucose 89 74 - 99 mg/dL    Sodium 143 136 - 145 mmol/L    Potassium 3.8 3.5 - 5.3 mmol/L    Chloride 111 (H) 98 - 107 mmol/L    Bicarbonate 23 21 - 32 mmol/L    Anion Gap 13 10 - 20 mmol/L    Urea Nitrogen 6 6 - 23 mg/dL    Creatinine " 0.59 0.50 - 1.30 mg/dL    eGFR >90 >60 mL/min/1.73m*2    Calcium 9.0 8.6 - 10.6 mg/dL    Albumin 4.3 3.4 - 5.0 g/dL    Alkaline Phosphatase 121 (H) 33 - 120 U/L    Total Protein 6.5 6.4 - 8.2 g/dL    AST 39 9 - 39 U/L    Bilirubin, Total 8.7 (H) 0.0 - 1.2 mg/dL    ALT 25 10 - 52 U/L   Morphology   Result Value Ref Range    RBC Morphology See Below     Sickle Cells Many     Target Cells Few     Porter-Foraker Bodies Present     Pappenheimer Bodies Present        Scheduled medications  enoxaparin, 40 mg, subcutaneous, q24h  folic acid, 1 mg, oral, Daily  polyethylene glycol, 17 g, oral, Daily  sennosides-docusate sodium, 2 tablet, oral, BID  sodium chloride, 500 mL, intravenous, Once      Continuous medications     PRN medications  PRN medications: diphenhydrAMINE, HYDROmorphone, ondansetron, oxygen, phenylephrine in NS      Assessment/Plan   Principal Problem:    Sickle cell disease with crisis (Multi)  Active Problems:    Priapism    Sickle-cell disease with pain (Multi)    Joellen Narayan is a 23 y.o. 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 Given 60mg sudafed x1. Aspiration of corpus cavernosum was successful with 40mL aspirated on each side. Pain in his chest is consistent with his typical sickle cell pain. EKG NSR without T wave changes. CXR negative for acute process. Admitted for management on pain. Started on IV dilaudid for uncomplicated sickle cell pain. DC pending improvement in pain.    # Hgb SS with severe pain  - OARRS reviewed, no aberrancies  - No current care path  - Hgb baseline ~8.5, Hgb 8.3  (8/11)  - Tbili baseline fluctuates ~5-13, Tbili 8.7 (8/11)  - LDH baseline fluctuates but ~500, LDH pending (8/11)  - Mild leukocytosis noted on admission consistent with previous admissions. WBC 11.6k (8/11); likely reactive as pt afebrile with no s/sx of infectious process  - Pt with CP and SOB, however these are all consistent with pt's baseline presentation of sickle cell crisis. In the absence of imaging findings c/f infection or ACS, no wheezing, and no fevers-- low c/f ACS at this time, however will monitor  - CXR (8/11) without evidence of acute process  - Troponin (7/8): 12  - EKG (8/11): NSR without T wave changes  - On admit started IV dilaudid 2mg q2hrs PRN severe pain (8/11-)  - Continue folic acid 1mg daily  - Utox (8/11): pending  - Hgb S (8/11): pending  - PO Zofran PRN for opioid-induced nausea, PO Benadryl PRN for opioid-induced pruritus, Bowel regimen for opioid-induced constipation with DocuSenna 2tabs BID and Miralax daily      # Nodular lymphocyte predominant Hodgkins lymphoma (NLPHL)   - Primary Oncologist: Dr. Stoll  - Enlarged lymph nodes noted 4/1/22  - RUQ US (11/14/22) with mildly enlarged LNs in the region of the kavin hepatis  - MRI liver (11/16/22) showed re-demonstration of bulky retroperitoneal lymphadenopathy and kavin hepatic lymphadenopathy    - (11/18/22) lymph node biopsy showed atypical lymphoid infiltrate. Reviewed by Hemepath board, insufficient for lymphoma diagnosis  - PET/CT (12/6/22) showing retroperitoneal lymphadenopathy  - Followed up with Dr. Stoll (12/16/22) with plan for surg/onc consult for large tissue bx but patient missed apt and was lost to follow up  - Requested new apt with Dr. Ronnie Marte 6/19/23, patient was not seen and lost to follow up  - CT a/p (11/28/23) increased size of retroperitoneal lymph nodes reflecting extramedullary hematopoiesis I/s/o sickle cell vs lymphoma  - Paraaortic LN bx via IR (11/30/23) consistent with NLPHL. Flow: no clonal B  "cell or T cell population identified, lymphocyte 95%, CD3+CD4+ 68%, CD3+CD8+ 7%, CD19+ 24%  - Elevated LDH/bili partially from sickle cell disease   - Chemotherapy (R-CHOP) was discussed with primary oncologist Dr. Stoll, and decision was made to simplify his chemotherapy to Rituxan and prednisone q3 weeks mainly due to frequent sickle cell crisis  - Current chemo regimen: rituxan and prednisone q3 weeks (C1 1/18/24, C2 2/8/24, Missed C3 d/t sickle cell pain crisis, C4 4/4/24, C5 5/16/24, C6 6/7/24)  - Per pt no longer taking Acyclovir 400mg BID prophy   - PET CT (7/25) overall showing great response to treatment with persistent residual viable disease involving a left common iliac node  - Last FUV with Dr. Stoll 7/25/24 rec RTC in 2 months (next FUV scheduled for 9/19)     # Hx Priapism  - Presented to the ED with priapism x3hrs--> Given 60mg sudafed x1. Aspiration of corpus cavernosum was successful with 40mL aspirated on each side  - Sudafed daily PRN priapism ordered  - Pt missed urology FUV 7/2     # Hx of nocturnal oxygen dependence and hypoxia on room air  - Historically improves with oxygen supplementation  - Pt hypoxic on admission (SpO2 92% on RA while asleep), placed on 2L supp O2    - CXR (8/11) without evidence of acute process; pt additionally denies SOB  - Has not had home oxygen for 2-3 years   - Pt did not qualify for home O2 per walking pulse ox performed during previous hospital admission on 2/26/24  - Wean as able, encourage incentive spirometry  - Pulm FUV 8/8- \"Given his history of sickle cell disease, it is important to ensure he has formal sleep testing to look at desaturations and presence of sleep apnea despite not having a convincing history/body habitus typical for suspecting sleep apnea- patients with sickle cell have a higher prevalence of sleep disorders including nocturnal hypoxemia\"--> rec sleep referral for formal testing if deemed appropriate, ABG and O2 destat testing, and TTE " with bubble study  - TTE ordered inpatient (8/11): pending     # Choledocholithiasis  - s/p ERCP 7/18/24 with a biliary sphincterotomy where sludge and stones were removed, achieving complete clearance  - Seen by gen surg 8/8/24 Dr. Dove who rec lap cholecystectomy d/t the chance of recurrence of choledocholithiasis  - Surgery has yet to be scheduled  - Tbili 8.7 (8/11) which is markedly improved, recent peak at 52.8 prior to ERCP during recent hospital admission    DVT prophy: Lovenox subcutaneous, SCDs, encourage ambulation     DISPO:  - Full code, confirmed on admit  - DC pending improvement in pain  - SUSIE Narayan (Parent) [phone]  - Dr. Stoll FUV 9/19, Sickle Cell FUV requested/pending    I spent >75 minutes in the professional and overall care of this patient    Assessment and plan discussed with attending physician Dr. Deanna Jenkins, APRN-CNP

## 2024-08-11 NOTE — ED PROVIDER NOTES
Emergency Department Provider Note        History of Present Illness     History provided by: Patient and Parent  Limitations to History: None  External Records Reviewed with Brief Summary: None    HPI:  Joellen Narayan is a 23 y.o. male with PMHx of sickle cell disease and priapism secondary to sickle cell crisis presenting today for priapism and chest pain secondary to sickle cell crisis. Patient endorses pain and priapism for 2.5 hours prior to being seen in the ED. He states that this is the 3rd time this has happened to him. The two prior times required priapism aspiration. Patient does not have a care plan but does note he has been given dilaudid in the past. Patient endorses some improvement of pain with application of heat packs.     Physical Exam   Triage vitals:  T 36.9 °C (98.4 °F)  HR 77  /87  RR 18  O2 94 %      General: Awake, alert, in moderate distress  Eyes: Gaze conjugate. No scleral icterus or injection.   HENT: Normo-cephalic, atraumatic. No stridor  CV: Regular rate, regular rhythm.  Resp: Breathing non-labored, speaking in full sentences.  Clear to auscultation bilaterally  GI: Soft, non-distended, non-tender. No rebound or guarding.  : Erect penis, tender  MSK/Extremities: No gross bony deformities. Moving all extremities  Skin: Warm. Appropriate color  Neuro: Alert. Oriented. Face symmetric. Speech is fluent.  Gross strength and sensation intact in b/l UE and LEs  Psych: Appropriate mood and affect    Medical Decision Making & ED Course   Medical Decision Makin y.o. male with PMHx of sickle cell disease and priapism secondary to sickle cell crisis presenting today for priapism and chest pain secondary to sickle cell crisis.  Upon initial evaluation he is distressed, tachypneic but with otherwise reassuring vital signs.  Physical exam notable for priapism.    Differential diagnosis includes Sickle cell crisis, priapism, acute chest, sickle cell disease, ACS.     EKG was  obtained showing no signs of acute ischemia.  Patient was placed on 2 L nasal cannula, ultrasound IV was placed, and patient was given Dilaudid and Toradol for pain.  Patient was given oral Sudafed and IV fluids.  A penile block was attempted using lidocaine, and priapism aspiration was done (see procedure note). Patient was additionally given more Dilaudid for pain.    Patient was signed out pending further completion of their workup.    ----    Differential diagnoses considered include but are not limited to: sickle cell crisis, priapism, acute chest, sickle cell disease, ACS     Social Determinants of Health which Significantly Impact Care: None identified     EKG Independent Interpretation: The EKG obtained at 0555 was independently interpreted by myself. It demonstrates sinus rhythm with a ventricular rate of 74. Regular axis. Intervals within normal limits. ST segments showed no signs of elevation or depression. Similar when compared to prior EKG from 7/11/24.     Independent Result Review and Interpretation: Chest X-Ray as interpreted by me revealed no evidence of acute cardiopulmonary process.     Chronic conditions affecting the patient's care: As documented above in [facility]    The patient was discussed with the following consultants/services:  Urology    Care Considerations: As documented above in [facility]    ED Course:  Diagnoses as of 08/11/24 2359   Priapism   Sickle cell disease with crisis (Multi)     Disposition   Patient was signed out to Dr. Xie at 0800 pending completion of their work-up.  Please see the next provider's transition of care note for the remainder of the patient's care.     Procedures   Procedures    Patient seen and discussed with ED attending physician.    Stephan Nunez DO  Emergency Medicine      Stephan Nunez DO  Resident  08/12/24 0004

## 2024-08-11 NOTE — ED PROCEDURE NOTE
Procedure  General    Performed by: Stephan Nunez DO  Authorized by: Derek Hernandez MD    Consent:     Consent obtained:  Written    Consent given by:  Patient    Risks, benefits, and alternatives were discussed: yes      Risks discussed:  Bleeding, infection, pain and incomplete drainage  Universal protocol:     Procedure explained and questions answered to patient or proxy's satisfaction: yes      Relevant documents present and verified: yes    Indications:     Indications:  Priapism secondary to acute sickle cell crisis  Pre-procedure details:     Skin preparation:  Chlorhexidine with alcohol  Sedation:     Sedation type:  None  Anesthesia:     Anesthesia method:  Nerve block    Block needle gauge:  25 G    Block anesthetic:  Lidocaine 1% w/o epi    Block technique:  4cc of lidocaine were placed on either side of the base of the penis at the 10 and 2 o'clock positions. An additional 2cc was placed on either side of the shaft of the penis approximately 2cm distal to the previous injections. Totalling 12cc's.    Block injection procedure:  Anatomic landmarks identified, introduced needle, incremental injection, anatomic landmarks palpated and negative aspiration for blood    Block outcome:  Incomplete block  Procedure specific details:      After injection of lidocaine, one 18 gauge needle was placed the left side of the base of the shaft of the penis into the corpora. A total of 40 mL of blood was aspirated.    Phenylephrine was then injected into one side of the corpus cavernosum followed by a saline flush. One mL intracavernous injections of phenylephrine solution were administered every three to five minutes followed by aspiration of blood.   This was repeated with the right side of the base of the shaft. An additional 40 mL of blood were retrieved from the right corpus cavernosum, totaling 80 mL for the procedure. Noticeable resolution of swelling was achieved and penis was wrapped with kerlex and coban  to prevent formation of hematoma and for continued compression.   Post-procedure details:     Procedure completion:  Tolerated with difficulty               Stephan Nunez DO  Resident  08/11/24 9520

## 2024-08-11 NOTE — PROGRESS NOTES
Emergency Department Transition of Care Note       Signout   I received Joellen Narayan in signout from Dr. Nunez.  Please see the ED Provider Note for all HPI, PE and MDM up to the time of signout at 7am.  This is in addition to the primary record.    In brief Joellen Narayan is an 23 y.o. male presenting for chest pain and priapism for 3 hours secondary to sickle cell disease. Has recurrent priapism, aspiration of corpus cavernosum was successful with 40 cc aspirated on each side with phenylephrine 1000 mcg utilized appropriately timed and eventual detumescence. 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 Per chart review, most recent admission 7/8-7/23 for sickle hepatopathy and cholodocholithiasis s/p ERCP on 7/18, with plan to do an outpatient cholecystectomy.    At the time of signout we were awaiting:  Lab results, chest x-ray and pain control including observation post aspiration.    ED Course & Medical Decision Making   Medical Decision Making:    Under my care, patient was discussed with urology and they recommended outpatient follow-up regarding his recurrent priapism. He was given an additional 1 mg of Dilaudid and continued to receive IV fluids.  Labs resulted showing no new transaminitis. Bilirubin is 8.7 last bili on 7/24 is 8.3. Hgb 8.3, retic % 22.7, last pRBC transfusion 2U on 7/17. Leukocytosis of 11.6 today, blood cultures ordered and drawn. Patient has received a total of 1.5 L NS. Patient discussed with admission coordinators and accepted to Heme/Onc for further care. Ultimately, while inpatient, patient did request discharge and was discharged by the inpatient team.    ED Course:  Diagnoses as of  08/11/24 1114   Priapism   Sickle cell disease with crisis (Multi)       Disposition   As a result of their workup, the patient will require admission to the hospital.  The patient was informed of his diagnosis.  The patient was given the opportunity to ask questions and I answered them. The patient agreed to be admitted to the hospital.    Procedures   Procedures    Patient seen and discussed with ED attending physician.    Diana Xie, DO  Emergency Medicine

## 2024-08-11 NOTE — ED TRIAGE NOTES
Enters ED reporting sickle cell pain crisis rated 9/10 to chest, back, and also endorses priapism. Denies injuries or falls. Baseline history of nodular lymphocyte predominant hodgkins lymphoma, sickle cell, choledocholithiasis with ERCP on 7/18/24

## 2024-08-12 ENCOUNTER — TELEPHONE (OUTPATIENT)
Dept: HEMATOLOGY/ONCOLOGY | Facility: HOSPITAL | Age: 24
End: 2024-08-12
Payer: COMMERCIAL

## 2024-08-12 LAB
HEMOGLOBIN A2: 3.5 % (ref 2–3.5)
HEMOGLOBIN A: 28.6 % (ref 95.8–98)
HEMOGLOBIN F: 1.6 % (ref 0–2)
HEMOGLOBIN IDENTIFICATION INTERPRETATION: ABNORMAL
HEMOGLOBIN S: 66.3 %
PATH REVIEW-HGB IDENTIFICATION: ABNORMAL

## 2024-08-13 ENCOUNTER — SOCIAL WORK (OUTPATIENT)
Dept: HEMATOLOGY/ONCOLOGY | Facility: HOSPITAL | Age: 24
End: 2024-08-13
Payer: COMMERCIAL

## 2024-08-13 NOTE — PROGRESS NOTES
Medical Certificate for Medical Necessity    Date Received: 8/13/2024   [employer]: [employer]  Completed by: Ian Cruz MD   Submitted via Fax: [phone] 08/13/24  Resources Offered: n/a   Additional Information: n/a    SW will continue to follow as needed.

## 2024-08-15 ENCOUNTER — PATIENT OUTREACH (OUTPATIENT)
Dept: CARE COORDINATION | Facility: CLINIC | Age: 24
End: 2024-08-15
Payer: COMMERCIAL

## 2024-08-20 ENCOUNTER — CLINICAL SUPPORT (OUTPATIENT)
Dept: EMERGENCY MEDICINE | Facility: HOSPITAL | Age: 24
End: 2024-08-20
Payer: COMMERCIAL

## 2024-08-20 PROCEDURE — 83615 LACTATE (LD) (LDH) ENZYME: CPT | Performed by: STUDENT IN AN ORGANIZED HEALTH CARE EDUCATION/TRAINING PROGRAM

## 2024-08-20 PROCEDURE — 80053 COMPREHEN METABOLIC PANEL: CPT | Performed by: EMERGENCY MEDICINE

## 2024-08-20 PROCEDURE — 83615 LACTATE (LD) (LDH) ENZYME: CPT | Performed by: EMERGENCY MEDICINE

## 2024-08-20 PROCEDURE — 85045 AUTOMATED RETICULOCYTE COUNT: CPT | Performed by: EMERGENCY MEDICINE

## 2024-08-20 PROCEDURE — 93005 ELECTROCARDIOGRAM TRACING: CPT

## 2024-08-20 PROCEDURE — 85007 BL SMEAR W/DIFF WBC COUNT: CPT | Performed by: EMERGENCY MEDICINE

## 2024-08-20 PROCEDURE — 83690 ASSAY OF LIPASE: CPT

## 2024-08-20 PROCEDURE — 99285 EMERGENCY DEPT VISIT HI MDM: CPT | Performed by: EMERGENCY MEDICINE

## 2024-08-20 PROCEDURE — 36415 COLL VENOUS BLD VENIPUNCTURE: CPT | Performed by: EMERGENCY MEDICINE

## 2024-08-20 PROCEDURE — 85045 AUTOMATED RETICULOCYTE COUNT: CPT | Performed by: STUDENT IN AN ORGANIZED HEALTH CARE EDUCATION/TRAINING PROGRAM

## 2024-08-20 PROCEDURE — 85007 BL SMEAR W/DIFF WBC COUNT: CPT | Performed by: STUDENT IN AN ORGANIZED HEALTH CARE EDUCATION/TRAINING PROGRAM

## 2024-08-20 PROCEDURE — 85027 COMPLETE CBC AUTOMATED: CPT | Performed by: STUDENT IN AN ORGANIZED HEALTH CARE EDUCATION/TRAINING PROGRAM

## 2024-08-20 PROCEDURE — 85027 COMPLETE CBC AUTOMATED: CPT | Performed by: EMERGENCY MEDICINE

## 2024-08-20 PROCEDURE — 80053 COMPREHEN METABOLIC PANEL: CPT | Performed by: STUDENT IN AN ORGANIZED HEALTH CARE EDUCATION/TRAINING PROGRAM

## 2024-08-21 ENCOUNTER — TELEPHONE (OUTPATIENT)
Dept: HEMATOLOGY/ONCOLOGY | Facility: HOSPITAL | Age: 24
End: 2024-08-21

## 2024-08-21 ENCOUNTER — APPOINTMENT (OUTPATIENT)
Dept: RADIOLOGY | Facility: HOSPITAL | Age: 24
End: 2024-08-21
Payer: COMMERCIAL

## 2024-08-21 ENCOUNTER — HOSPITAL ENCOUNTER (EMERGENCY)
Facility: HOSPITAL | Age: 24
Discharge: HOME | End: 2024-08-21
Attending: STUDENT IN AN ORGANIZED HEALTH CARE EDUCATION/TRAINING PROGRAM
Payer: COMMERCIAL

## 2024-08-21 VITALS
TEMPERATURE: 99.9 F | HEART RATE: 74 BPM | BODY MASS INDEX: 20.92 KG/M2 | DIASTOLIC BLOOD PRESSURE: 65 MMHG | HEIGHT: 74 IN | OXYGEN SATURATION: 94 % | RESPIRATION RATE: 14 BRPM | SYSTOLIC BLOOD PRESSURE: 123 MMHG | WEIGHT: 163 LBS

## 2024-08-21 DIAGNOSIS — D57.00 SICKLE CELL PAIN CRISIS (MULTI): Primary | ICD-10-CM

## 2024-08-21 DIAGNOSIS — N48.30 PRIAPISM: ICD-10-CM

## 2024-08-21 DIAGNOSIS — D57.00 SICKLE-CELL DISEASE WITH PAIN (MULTI): ICD-10-CM

## 2024-08-21 LAB
ALBUMIN SERPL BCP-MCNC: 4.7 G/DL (ref 3.4–5)
ALP SERPL-CCNC: 134 U/L (ref 33–120)
ALT SERPL W P-5'-P-CCNC: 32 U/L (ref 10–52)
ANION GAP SERPL CALC-SCNC: 15 MMOL/L (ref 10–20)
AST SERPL W P-5'-P-CCNC: 49 U/L (ref 9–39)
ATRIAL RATE: 82 BPM
BASO STIPL BLD QL SMEAR: PRESENT
BASOPHILS # BLD MANUAL: 0.09 X10*3/UL (ref 0–0.1)
BASOPHILS NFR BLD MANUAL: 0.9 %
BILIRUB SERPL-MCNC: 9.2 MG/DL (ref 0–1.2)
BUN SERPL-MCNC: 6 MG/DL (ref 6–23)
CALCIUM SERPL-MCNC: 9.3 MG/DL (ref 8.6–10.6)
CHLORIDE SERPL-SCNC: 108 MMOL/L (ref 98–107)
CO2 SERPL-SCNC: 20 MMOL/L (ref 21–32)
CREAT SERPL-MCNC: 0.6 MG/DL (ref 0.5–1.3)
EGFRCR SERPLBLD CKD-EPI 2021: >90 ML/MIN/1.73M*2
EOSINOPHIL # BLD MANUAL: 0.26 X10*3/UL (ref 0–0.7)
EOSINOPHIL NFR BLD MANUAL: 2.6 %
ERYTHROCYTE [DISTWIDTH] IN BLOOD BY AUTOMATED COUNT: 21.6 % (ref 11.5–14.5)
GLUCOSE SERPL-MCNC: 86 MG/DL (ref 74–99)
HCT VFR BLD AUTO: 21.8 % (ref 41–52)
HGB BLD-MCNC: 8.3 G/DL (ref 13.5–17.5)
HGB RETIC QN: 33 PG (ref 28–38)
IMM GRANULOCYTES # BLD AUTO: 0.07 X10*3/UL (ref 0–0.7)
IMM GRANULOCYTES NFR BLD AUTO: 0.7 % (ref 0–0.9)
IMMATURE RETIC FRACTION: 22.7 %
LDH SERPL L TO P-CCNC: 479 U/L (ref 84–246)
LIPASE SERPL-CCNC: 28 U/L (ref 9–82)
LYMPHOCYTES # BLD MANUAL: 3.39 X10*3/UL (ref 1.2–4.8)
LYMPHOCYTES NFR BLD MANUAL: 33.6 %
MCH RBC QN AUTO: 31 PG (ref 26–34)
MCHC RBC AUTO-ENTMCNC: 38.1 G/DL (ref 32–36)
MCV RBC AUTO: 81 FL (ref 80–100)
MONOCYTES # BLD MANUAL: 1.04 X10*3/UL (ref 0.1–1)
MONOCYTES NFR BLD MANUAL: 10.3 %
NEUTS SEG # BLD MANUAL: 5.31 X10*3/UL (ref 1.2–7)
NEUTS SEG NFR BLD MANUAL: 52.6 %
NRBC BLD-RTO: 2.8 /100 WBCS (ref 0–0)
P AXIS: 66 DEGREES
P OFFSET: 181 MS
P ONSET: 129 MS
PLATELET # BLD AUTO: 245 X10*3/UL (ref 150–450)
POLYCHROMASIA BLD QL SMEAR: ABNORMAL
POTASSIUM SERPL-SCNC: 3.9 MMOL/L (ref 3.5–5.3)
PR INTERVAL: 170 MS
PROT SERPL-MCNC: 7 G/DL (ref 6.4–8.2)
Q ONSET: 214 MS
QRS COUNT: 13 BEATS
QRS DURATION: 102 MS
QT INTERVAL: 354 MS
QTC CALCULATION(BAZETT): 413 MS
QTC FREDERICIA: 392 MS
R AXIS: 83 DEGREES
RBC # BLD AUTO: 2.68 X10*6/UL (ref 4.5–5.9)
RBC MORPH BLD: ABNORMAL
RETICS #: 0.66 X10*6/UL (ref 0.02–0.12)
RETICS/RBC NFR AUTO: 24.6 % (ref 0.5–2)
SICKLE CELLS BLD QL SMEAR: ABNORMAL
SODIUM SERPL-SCNC: 139 MMOL/L (ref 136–145)
T AXIS: 2 DEGREES
T OFFSET: 391 MS
TARGETS BLD QL SMEAR: ABNORMAL
TOTAL CELLS COUNTED BLD: 116
VENTRICULAR RATE: 82 BPM
WBC # BLD AUTO: 10.1 X10*3/UL (ref 4.4–11.3)

## 2024-08-21 PROCEDURE — 2500000005 HC RX 250 GENERAL PHARMACY W/O HCPCS

## 2024-08-21 PROCEDURE — 2500000004 HC RX 250 GENERAL PHARMACY W/ HCPCS (ALT 636 FOR OP/ED)

## 2024-08-21 PROCEDURE — 2500000001 HC RX 250 WO HCPCS SELF ADMINISTERED DRUGS (ALT 637 FOR MEDICARE OP): Performed by: STUDENT IN AN ORGANIZED HEALTH CARE EDUCATION/TRAINING PROGRAM

## 2024-08-21 PROCEDURE — 2500000004 HC RX 250 GENERAL PHARMACY W/ HCPCS (ALT 636 FOR OP/ED): Performed by: NURSE PRACTITIONER

## 2024-08-21 PROCEDURE — 71046 X-RAY EXAM CHEST 2 VIEWS: CPT | Performed by: STUDENT IN AN ORGANIZED HEALTH CARE EDUCATION/TRAINING PROGRAM

## 2024-08-21 PROCEDURE — 96374 THER/PROPH/DIAG INJ IV PUSH: CPT | Performed by: EMERGENCY MEDICINE

## 2024-08-21 PROCEDURE — 96375 TX/PRO/DX INJ NEW DRUG ADDON: CPT | Performed by: EMERGENCY MEDICINE

## 2024-08-21 PROCEDURE — 2500000001 HC RX 250 WO HCPCS SELF ADMINISTERED DRUGS (ALT 637 FOR MEDICARE OP): Performed by: NURSE PRACTITIONER

## 2024-08-21 PROCEDURE — 76705 ECHO EXAM OF ABDOMEN: CPT | Performed by: STUDENT IN AN ORGANIZED HEALTH CARE EDUCATION/TRAINING PROGRAM

## 2024-08-21 PROCEDURE — 96376 TX/PRO/DX INJ SAME DRUG ADON: CPT | Performed by: EMERGENCY MEDICINE

## 2024-08-21 PROCEDURE — 76705 ECHO EXAM OF ABDOMEN: CPT

## 2024-08-21 PROCEDURE — 2500000004 HC RX 250 GENERAL PHARMACY W/ HCPCS (ALT 636 FOR OP/ED): Performed by: STUDENT IN AN ORGANIZED HEALTH CARE EDUCATION/TRAINING PROGRAM

## 2024-08-21 PROCEDURE — 71046 X-RAY EXAM CHEST 2 VIEWS: CPT

## 2024-08-21 PROCEDURE — 96361 HYDRATE IV INFUSION ADD-ON: CPT | Performed by: EMERGENCY MEDICINE

## 2024-08-21 PROCEDURE — 2500000004 HC RX 250 GENERAL PHARMACY W/ HCPCS (ALT 636 FOR OP/ED): Performed by: EMERGENCY MEDICINE

## 2024-08-21 RX ORDER — HYDROMORPHONE HYDROCHLORIDE 1 MG/ML
0.5 INJECTION, SOLUTION INTRAMUSCULAR; INTRAVENOUS; SUBCUTANEOUS ONCE
Status: COMPLETED | OUTPATIENT
Start: 2024-08-21 | End: 2024-08-21

## 2024-08-21 RX ORDER — PSEUDOEPHEDRINE HYDROCHLORIDE 60 MG/1
60 TABLET ORAL ONCE
Status: COMPLETED | OUTPATIENT
Start: 2024-08-21 | End: 2024-08-21

## 2024-08-21 RX ORDER — LIDOCAINE HYDROCHLORIDE 10 MG/ML
10 INJECTION INFILTRATION; PERINEURAL ONCE
Status: COMPLETED | OUTPATIENT
Start: 2024-08-21 | End: 2024-08-21

## 2024-08-21 RX ORDER — HYDROMORPHONE HYDROCHLORIDE 1 MG/ML
1 INJECTION, SOLUTION INTRAMUSCULAR; INTRAVENOUS; SUBCUTANEOUS ONCE
Status: COMPLETED | OUTPATIENT
Start: 2024-08-21 | End: 2024-08-21

## 2024-08-21 RX ORDER — OXYCODONE HYDROCHLORIDE 15 MG/1
15 TABLET ORAL EVERY 6 HOURS PRN
Qty: 20 TABLET | Refills: 0 | Status: ON HOLD | OUTPATIENT
Start: 2024-08-21 | End: 2024-08-26

## 2024-08-21 RX ORDER — PHENYLEPHRINE HCL IN 0.9% NACL 1 MG/10 ML
100 SYRINGE (ML) INTRAVENOUS ONCE
Status: DISCONTINUED | OUTPATIENT
Start: 2024-08-21 | End: 2024-08-21 | Stop reason: HOSPADM

## 2024-08-21 RX ORDER — KETOROLAC TROMETHAMINE 30 MG/ML
30 INJECTION, SOLUTION INTRAMUSCULAR; INTRAVENOUS ONCE
Status: COMPLETED | OUTPATIENT
Start: 2024-08-21 | End: 2024-08-21

## 2024-08-21 RX ORDER — DIPHENHYDRAMINE HYDROCHLORIDE 50 MG/ML
25 INJECTION INTRAMUSCULAR; INTRAVENOUS ONCE
Status: COMPLETED | OUTPATIENT
Start: 2024-08-21 | End: 2024-08-21

## 2024-08-21 RX ORDER — HYDROMORPHONE HYDROCHLORIDE 1 MG/ML
1 INJECTION, SOLUTION INTRAMUSCULAR; INTRAVENOUS; SUBCUTANEOUS ONCE
Status: DISCONTINUED | OUTPATIENT
Start: 2024-08-21 | End: 2024-08-21 | Stop reason: HOSPADM

## 2024-08-21 ASSESSMENT — PAIN SCALES - GENERAL
PAINLEVEL_OUTOF10: 10 - WORST POSSIBLE PAIN
PAINLEVEL_OUTOF10: 9
PAINLEVEL_OUTOF10: 8
PAINLEVEL_OUTOF10: 8

## 2024-08-21 ASSESSMENT — PAIN DESCRIPTION - LOCATION
LOCATION: CHEST
LOCATION: PENIS
LOCATION: PENIS
LOCATION: CHEST

## 2024-08-21 NOTE — TELEPHONE ENCOUNTER
Refill request received for Oxycodone 15mg for priapism episode.  Preferred pharmacy is [facility] at [address] in [place].  Message sent to Sickle Cell team.

## 2024-08-21 NOTE — Clinical Note
Joellen Narayan was seen and treated in our emergency department on 8/20/2024.  He may return to work on 08/22/2024.       If you have any questions or concerns, please don't hesitate to call.      Jadyn Burr, APRN-CNP

## 2024-08-21 NOTE — ED TRIAGE NOTES
Pt presents to ED for c/o sickle cell pain. Pt endorse chest and abd pain. Pt deniers SOB. Pt endorses priapism since noon

## 2024-08-21 NOTE — ED PROVIDER NOTES
Emergency Department Provider Note        History of Present Illness     History provided by: Patient  Limitations to History: None  External Records Reviewed with Brief Summary: Discharge Summary from 8/11/2024 which showed admission for sickle cell pain management and previous and management.    HPI:  Joellen Narayan is a 23 y.o. 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  Patient states that his chest pain is located in his diffuse back pain and left flank, which is his usual location of pain.  Declines any fevers, chills, shortness of breath, cough, nausea, vomiting, or any diarrhea.  Does note intermittent abdominal pain, and believes that his eyes are darker than usual and is concerned about his bilirubin level.     Physical Exam   Triage vitals:  T 37.7 °C (99.9 °F)  HR 81  /87  RR 17  O2 95 % None (Room air)    GEN:  A&Ox3, no acute distress, appears comfortable. Conversational and appropriate.    HEENT: Normocephalic, atraumatic.  Scleral icterus, conjunctiva pink with no redness or exudates. Hearing grossly intact. Moist mucous membranes.  CARDIO: Normal rate and regular rhythm. Normal S1, S2  without murmurs, rubs, or gallops.   PULM: Clear to auscultation bilaterally. No rales, rhonchi, or wheezes. No accessory muscle use or stridor.  GI: Soft, tenderness to palpation in the right upper quadrant and left flank, non-distended. No rebound tenderness or guarding.   SKIN: Warm and dry, no rashes, lesions, petechiae, or purpura.  MSK: ROM intact in all 4 extremities  without contractures or pain. No peripheral edema, contusions, or wounds.    NEURO: No focal findings identified. No confusion or gross mental status changes.  PSYCH: Appropriate mood and behavior, converses and responds appropriately during exam.    Medical Decision Making & ED Course   Medical Decision Makin y.o. 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  Patient received pseudoephedrine in the waiting room, and on reevaluation, noted improvement of his priapism.  Patient notes he did not have any home medication to take for this, however did note that his previous episodes of occasionally improved with medication.  Does note prior priapism drainage 1 week ago, and would like to hold off and continue to observe for improvement with pain medication.  Given detumescence with Sudafed, will monitor for continued improvement.  Per care plan was given 1 mg Dilaudid x 3 doses, Benadryl, and Toradol with improvement of his pain.  However, the setting of his right upper quadrant tenderness to palpation, and ultrasound gallbladder will be ordered to rule out acute cholecystitis.  Lipase within normal limits.  Patient was at home, provider, pending ultrasound gallbladder final interpretation and reevaluation.    ----      Differential diagnoses considered include but are not limited to: Acute sickle cell pain crisis, acute chest, cholecystitis, priapism     Social Determinants of Health which Significantly Impact Care: None identified     EKG Independent Interpretation: EKG interpreted by  myself. Please see ED Course for full interpretation.    Independent Result Review and Interpretation: Relevant laboratory and radiographic results were reviewed and independently interpreted by myself.  As necessary, they are commented on in the ED Course.    Chronic conditions affecting the patient's care: As documented above in [facility]    The patient was discussed with the following consultants/services: None    Care Considerations: As documented above in [facility]    ED Course:  ED Course as of 24 0731   Wed Aug 21, 2024   0455 Bilirubin, Total (!): 9.2 [FN]   0527 Attending attestation note  23-year-old male history of sickle cell disease and lymphoma.  Presenting with priapism, ruq pain, CP.. no SOB  Exam scleral icterus, RUQ mild ttp, CTAB, priapism noted  [facility]  SCD cf crisis. Med management of priapism for now.  Will consider draining if patient is not improving.  Patient feels that he is starting to improve after medications.  Will check labs to evaluate for hemolysis, worsening anemia, leukocytosis, electrolyte metabolic abnormalities, renal injury.  Will evaluate for acute chest syndrome with chest x-ray.  Screen for ACS.  Rule out cholecystitis. [FN]      ED Course User Index  [FN] Jen Deleon MD         Diagnoses as of 24 0731   Sickle cell pain crisis (Multi)   Priapism     Disposition   Patient was signed out to oncoming provider at 0700 pending completion of their work-up.  Please see the next provider's transition of care note for the remainder of the patient's care.     Procedures   Procedures    Patient seen and discussed with ED attending physician.    Amanda Fiore DO  Emergency Medicine       Amanda Fiore DO  Resident  [phone]

## 2024-08-21 NOTE — PROGRESS NOTES
Handoff received: 8/21/2024 0 700 handoff care received from Dr. Wallace at this time. Please refer to her note for initial plan of care of this patient.     Patient signed out to me pending workup results and reevaluation.  I agree with my prior providers assessment and plan of care.  Workup results were relatively unremarkable, no indication for ACS consult.  See results review.  I did not need to order any additional laboratory/radiologic studies for the patient during my course of care.  Patient was given additional medications for his symptoms and upon reevaluation he is clinically improved.  He denies any continued priapism or pain.  He is tolerating p.o. without difficulty and ambulating at his baseline. Findings were discussed with patient and patient agreeable for plan to discharge home with primary care provider/sickle cell clinic follow up as scheduled in the next week and to continue his already prescribed medications at home as indicated.  Return precautions discussed and patient acknowledged understanding.  All questions and concerns answered prior to discharge.     This patient was staffed with ED Attending Dr. Juan to review the plan of care during ED course.     *Please note that portions of this note may have been completed with a voice recognition program.  Efforts were made to edit the dictations but occasionally, words are mis-transcribed.

## 2024-08-23 ENCOUNTER — APPOINTMENT (OUTPATIENT)
Dept: RADIOLOGY | Facility: HOSPITAL | Age: 24
DRG: 812 | End: 2024-08-23
Payer: COMMERCIAL

## 2024-08-23 ENCOUNTER — OFFICE VISIT (OUTPATIENT)
Dept: HEMATOLOGY/ONCOLOGY | Facility: HOSPITAL | Age: 24
DRG: 812 | End: 2024-08-23
Payer: COMMERCIAL

## 2024-08-23 ENCOUNTER — HOSPITAL ENCOUNTER (INPATIENT)
Facility: HOSPITAL | Age: 24
DRG: 812 | End: 2024-08-23
Attending: STUDENT IN AN ORGANIZED HEALTH CARE EDUCATION/TRAINING PROGRAM | Admitting: HOSPITALIST
Payer: COMMERCIAL

## 2024-08-23 ENCOUNTER — CLINICAL SUPPORT (OUTPATIENT)
Dept: EMERGENCY MEDICINE | Facility: HOSPITAL | Age: 24
DRG: 812 | End: 2024-08-23
Payer: COMMERCIAL

## 2024-08-23 DIAGNOSIS — D57.00 SICKLE CELL ANEMIA WITH PAIN (MULTI): ICD-10-CM

## 2024-08-23 DIAGNOSIS — D57.00 SICKLE CELL CRISIS (MULTI): Primary | ICD-10-CM

## 2024-08-23 DIAGNOSIS — K80.20 CALCULUS OF GALLBLADDER WITHOUT CHOLECYSTITIS WITHOUT OBSTRUCTION: ICD-10-CM

## 2024-08-23 DIAGNOSIS — D57.09 SICKLE CELL DISEASE WITH CRISIS AND OTHER COMPLICATION (MULTI): ICD-10-CM

## 2024-08-23 DIAGNOSIS — D57.09 PRIAPISM DUE TO SICKLE CELL DISEASE (MULTI): ICD-10-CM

## 2024-08-23 DIAGNOSIS — D57.00 SICKLE CELL DISEASE WITH CRISIS (MULTI): ICD-10-CM

## 2024-08-23 DIAGNOSIS — N48.32 PRIAPISM DUE TO SICKLE CELL DISEASE (MULTI): ICD-10-CM

## 2024-08-23 LAB
ALBUMIN SERPL BCP-MCNC: 4.2 G/DL (ref 3.4–5)
ALBUMIN SERPL BCP-MCNC: 4.6 G/DL (ref 3.4–5)
ALP SERPL-CCNC: 116 U/L (ref 33–120)
ALP SERPL-CCNC: 148 U/L (ref 33–120)
ALT SERPL W P-5'-P-CCNC: 23 U/L (ref 10–52)
ALT SERPL W P-5'-P-CCNC: 26 U/L (ref 10–52)
AMPHETAMINES UR QL SCN: ABNORMAL
ANION GAP BLDA CALCULATED.4IONS-SCNC: ABNORMAL MMOL/L
ANION GAP SERPL CALC-SCNC: 12 MMOL/L (ref 10–20)
ANION GAP SERPL CALC-SCNC: 14 MMOL/L (ref 10–20)
APPEARANCE UR: CLEAR
AST SERPL W P-5'-P-CCNC: 47 U/L (ref 9–39)
AST SERPL W P-5'-P-CCNC: 55 U/L (ref 9–39)
ATRIAL RATE: 92 BPM
BARBITURATES UR QL SCN: ABNORMAL
BASE EXCESS BLDA CALC-SCNC: ABNORMAL MMOL/L
BASOPHILS # BLD AUTO: 0.03 X10*3/UL (ref 0–0.1)
BASOPHILS # BLD AUTO: 0.04 X10*3/UL (ref 0–0.1)
BASOPHILS NFR BLD AUTO: 0.3 %
BASOPHILS NFR BLD AUTO: 0.3 %
BILIRUB SERPL-MCNC: 7.1 MG/DL (ref 0–1.2)
BILIRUB SERPL-MCNC: 8.5 MG/DL (ref 0–1.2)
BILIRUB UR STRIP.AUTO-MCNC: NEGATIVE MG/DL
BODY TEMPERATURE: 37 DEGREES CELSIUS
BUN SERPL-MCNC: 5 MG/DL (ref 6–23)
BUN SERPL-MCNC: 8 MG/DL (ref 6–23)
BURR CELLS BLD QL SMEAR: NORMAL
BZE UR QL SCN: ABNORMAL
CA-I BLDA-SCNC: 1.24 MMOL/L (ref 1.1–1.33)
CALCIUM SERPL-MCNC: 8.6 MG/DL (ref 8.6–10.6)
CALCIUM SERPL-MCNC: 9.4 MG/DL (ref 8.6–10.6)
CANNABINOIDS UR QL SCN: ABNORMAL
CHLORIDE BLDA-SCNC: 100 MMOL/L (ref 98–107)
CHLORIDE SERPL-SCNC: 105 MMOL/L (ref 98–107)
CHLORIDE SERPL-SCNC: 108 MMOL/L (ref 98–107)
CO2 SERPL-SCNC: 22 MMOL/L (ref 21–32)
CO2 SERPL-SCNC: 24 MMOL/L (ref 21–32)
COLOR UR: YELLOW
CREAT SERPL-MCNC: 0.51 MG/DL (ref 0.5–1.3)
CREAT SERPL-MCNC: 0.61 MG/DL (ref 0.5–1.3)
CREAT UR-MCNC: 61.4 MG/DL (ref 20–370)
EGFRCR SERPLBLD CKD-EPI 2021: >90 ML/MIN/1.73M*2
EGFRCR SERPLBLD CKD-EPI 2021: >90 ML/MIN/1.73M*2
EOSINOPHIL # BLD AUTO: 0.09 X10*3/UL (ref 0–0.7)
EOSINOPHIL # BLD AUTO: 0.17 X10*3/UL (ref 0–0.7)
EOSINOPHIL NFR BLD AUTO: 0.6 %
EOSINOPHIL NFR BLD AUTO: 1.6 %
ERYTHROCYTE [DISTWIDTH] IN BLOOD BY AUTOMATED COUNT: 21.9 % (ref 11.5–14.5)
ERYTHROCYTE [DISTWIDTH] IN BLOOD BY AUTOMATED COUNT: 22.7 % (ref 11.5–14.5)
GLUCOSE BLDA-MCNC: <4 MG/DL (ref 74–99)
GLUCOSE SERPL-MCNC: 102 MG/DL (ref 74–99)
GLUCOSE SERPL-MCNC: 79 MG/DL (ref 74–99)
GLUCOSE UR STRIP.AUTO-MCNC: NORMAL MG/DL
HCO3 BLDA-SCNC: ABNORMAL MMOL/L
HCT VFR BLD AUTO: 19.9 % (ref 41–52)
HCT VFR BLD AUTO: 22.6 % (ref 41–52)
HCT VFR BLD EST: 37 % (ref 41–52)
HEMOGLOBIN A2: 3.3 % (ref 2–3.5)
HEMOGLOBIN A: 25 % (ref 95.8–98)
HEMOGLOBIN F: 1.7 % (ref 0–2)
HEMOGLOBIN IDENTIFICATION INTERPRETATION: ABNORMAL
HEMOGLOBIN S: 70 %
HGB BLD-MCNC: 7.4 G/DL (ref 13.5–17.5)
HGB BLD-MCNC: 8.3 G/DL (ref 13.5–17.5)
HGB BLDA-MCNC: 12.2 G/DL (ref 13.5–17.5)
HGB RETIC QN: 28 PG (ref 28–38)
HGB RETIC QN: 29 PG (ref 28–38)
IMM GRANULOCYTES # BLD AUTO: 0.09 X10*3/UL (ref 0–0.7)
IMM GRANULOCYTES # BLD AUTO: 0.14 X10*3/UL (ref 0–0.7)
IMM GRANULOCYTES NFR BLD AUTO: 0.9 % (ref 0–0.9)
IMM GRANULOCYTES NFR BLD AUTO: 1 % (ref 0–0.9)
IMMATURE RETIC FRACTION: 22 %
IMMATURE RETIC FRACTION: 22.8 %
INHALED O2 CONCENTRATION: 21 %
KETONES UR STRIP.AUTO-MCNC: NEGATIVE MG/DL
LACTATE BLDA-SCNC: 12.2 MMOL/L (ref 0.4–2)
LACTATE BLDV-SCNC: 0.7 MMOL/L (ref 0.4–2)
LDH SERPL L TO P-CCNC: 476 U/L (ref 84–246)
LDH SERPL L TO P-CCNC: 521 U/L (ref 84–246)
LEUKOCYTE ESTERASE UR QL STRIP.AUTO: NEGATIVE
LYMPHOCYTES # BLD AUTO: 1.07 X10*3/UL (ref 1.2–4.8)
LYMPHOCYTES # BLD AUTO: 2.57 X10*3/UL (ref 1.2–4.8)
LYMPHOCYTES NFR BLD AUTO: 24.9 %
LYMPHOCYTES NFR BLD AUTO: 7.7 %
MAGNESIUM SERPL-MCNC: 2.38 MG/DL (ref 1.6–2.4)
MCH RBC QN AUTO: 30.2 PG (ref 26–34)
MCH RBC QN AUTO: 30.8 PG (ref 26–34)
MCHC RBC AUTO-ENTMCNC: 36.7 G/DL (ref 32–36)
MCHC RBC AUTO-ENTMCNC: 37.2 G/DL (ref 32–36)
MCV RBC AUTO: 82 FL (ref 80–100)
MCV RBC AUTO: 83 FL (ref 80–100)
MONOCYTES # BLD AUTO: 1.19 X10*3/UL (ref 0.1–1)
MONOCYTES # BLD AUTO: 1.31 X10*3/UL (ref 0.1–1)
MONOCYTES NFR BLD AUTO: 11.5 %
MONOCYTES NFR BLD AUTO: 9.4 %
MUCOUS THREADS #/AREA URNS AUTO: NORMAL /LPF
NEUTROPHILS # BLD AUTO: 11.31 X10*3/UL (ref 1.2–7.7)
NEUTROPHILS # BLD AUTO: 6.27 X10*3/UL (ref 1.2–7.7)
NEUTROPHILS NFR BLD AUTO: 60.8 %
NEUTROPHILS NFR BLD AUTO: 81 %
NITRITE UR QL STRIP.AUTO: NEGATIVE
NRBC BLD-RTO: 1.9 /100 WBCS (ref 0–0)
NRBC BLD-RTO: 1.9 /100 WBCS (ref 0–0)
OXYHGB MFR BLDA: 3.2 % (ref 94–98)
P AXIS: 48 DEGREES
P OFFSET: 184 MS
P ONSET: 129 MS
PAPPENHEIMER BOD BLD QL SMEAR: PRESENT
PAPPENHEIMER BOD BLD QL SMEAR: PRESENT
PATH REVIEW-HGB IDENTIFICATION: ABNORMAL
PCO2 BLDA: >125 MM HG (ref 38–42)
PCP UR QL SCN: ABNORMAL
PH BLDA: <6.8 PH (ref 7.38–7.42)
PH UR STRIP.AUTO: 6 [PH]
PLATELET # BLD AUTO: 179 X10*3/UL (ref 150–450)
PLATELET # BLD AUTO: 216 X10*3/UL (ref 150–450)
PO2 BLDA: <6 MM HG (ref 85–95)
POLYCHROMASIA BLD QL SMEAR: NORMAL
POLYCHROMASIA BLD QL SMEAR: NORMAL
POTASSIUM BLDA-SCNC: 7.1 MMOL/L (ref 3.5–5.3)
POTASSIUM SERPL-SCNC: 4.2 MMOL/L (ref 3.5–5.3)
POTASSIUM SERPL-SCNC: 4.4 MMOL/L (ref 3.5–5.3)
PR INTERVAL: 176 MS
PROT SERPL-MCNC: 6.3 G/DL (ref 6.4–8.2)
PROT SERPL-MCNC: 6.9 G/DL (ref 6.4–8.2)
PROT UR STRIP.AUTO-MCNC: NEGATIVE MG/DL
Q ONSET: 217 MS
QRS COUNT: 15 BEATS
QRS DURATION: 102 MS
QT INTERVAL: 336 MS
QTC CALCULATION(BAZETT): 415 MS
QTC FREDERICIA: 387 MS
R AXIS: 86 DEGREES
RBC # BLD AUTO: 2.4 X10*6/UL (ref 4.5–5.9)
RBC # BLD AUTO: 2.75 X10*6/UL (ref 4.5–5.9)
RBC # UR STRIP.AUTO: ABNORMAL /UL
RBC #/AREA URNS AUTO: NORMAL /HPF
RBC MORPH BLD: NORMAL
RBC MORPH BLD: NORMAL
RETICS #: 0.63 X10*6/UL (ref 0.02–0.12)
RETICS #: 0.7 X10*6/UL (ref 0.02–0.12)
RETICS/RBC NFR AUTO: 25.4 % (ref 0.5–2)
RETICS/RBC NFR AUTO: 26.3 % (ref 0.5–2)
SAO2 % BLDA: 3 % (ref 94–100)
SARS-COV-2 RNA RESP QL NAA+PROBE: NOT DETECTED
SICKLE CELLS BLD QL SMEAR: NORMAL
SICKLE CELLS BLD QL SMEAR: NORMAL
SODIUM BLDA-SCNC: 137 MMOL/L (ref 136–145)
SODIUM SERPL-SCNC: 137 MMOL/L (ref 136–145)
SODIUM SERPL-SCNC: 140 MMOL/L (ref 136–145)
SP GR UR STRIP.AUTO: 1.04
T AXIS: -20 DEGREES
T OFFSET: 385 MS
TARGETS BLD QL SMEAR: NORMAL
TARGETS BLD QL SMEAR: NORMAL
UROBILINOGEN UR STRIP.AUTO-MCNC: NORMAL MG/DL
VENTRICULAR RATE: 92 BPM
WBC # BLD AUTO: 10.3 X10*3/UL (ref 4.4–11.3)
WBC # BLD AUTO: 14 X10*3/UL (ref 4.4–11.3)
WBC #/AREA URNS AUTO: NORMAL /HPF

## 2024-08-23 PROCEDURE — 82435 ASSAY OF BLOOD CHLORIDE: CPT

## 2024-08-23 PROCEDURE — 80346 BENZODIAZEPINES1-12: CPT

## 2024-08-23 PROCEDURE — 71045 X-RAY EXAM CHEST 1 VIEW: CPT | Performed by: RADIOLOGY

## 2024-08-23 PROCEDURE — 83021 HEMOGLOBIN CHROMOTOGRAPHY: CPT

## 2024-08-23 PROCEDURE — 83615 LACTATE (LD) (LDH) ENZYME: CPT

## 2024-08-23 PROCEDURE — 2500000001 HC RX 250 WO HCPCS SELF ADMINISTERED DRUGS (ALT 637 FOR MEDICARE OP)

## 2024-08-23 PROCEDURE — 96372 THER/PROPH/DIAG INJ SC/IM: CPT

## 2024-08-23 PROCEDURE — 2500000004 HC RX 250 GENERAL PHARMACY W/ HCPCS (ALT 636 FOR OP/ED)

## 2024-08-23 PROCEDURE — 36415 COLL VENOUS BLD VENIPUNCTURE: CPT

## 2024-08-23 PROCEDURE — 93005 ELECTROCARDIOGRAM TRACING: CPT

## 2024-08-23 PROCEDURE — 71045 X-RAY EXAM CHEST 1 VIEW: CPT

## 2024-08-23 PROCEDURE — 71275 CT ANGIOGRAPHY CHEST: CPT

## 2024-08-23 PROCEDURE — 83735 ASSAY OF MAGNESIUM: CPT

## 2024-08-23 PROCEDURE — 80053 COMPREHEN METABOLIC PANEL: CPT

## 2024-08-23 PROCEDURE — 2550000001 HC RX 255 CONTRASTS

## 2024-08-23 PROCEDURE — G0378 HOSPITAL OBSERVATION PER HR: HCPCS

## 2024-08-23 PROCEDURE — 96376 TX/PRO/DX INJ SAME DRUG ADON: CPT

## 2024-08-23 PROCEDURE — 96375 TX/PRO/DX INJ NEW DRUG ADDON: CPT

## 2024-08-23 PROCEDURE — 81001 URINALYSIS AUTO W/SCOPE: CPT | Performed by: EMERGENCY MEDICINE

## 2024-08-23 PROCEDURE — 74177 CT ABD & PELVIS W/CONTRAST: CPT

## 2024-08-23 PROCEDURE — 87635 SARS-COV-2 COVID-19 AMP PRB: CPT | Performed by: EMERGENCY MEDICINE

## 2024-08-23 PROCEDURE — 84484 ASSAY OF TROPONIN QUANT: CPT

## 2024-08-23 PROCEDURE — 0V9S3ZZ DRAINAGE OF PENIS, PERCUTANEOUS APPROACH: ICD-10-PCS | Performed by: STUDENT IN AN ORGANIZED HEALTH CARE EDUCATION/TRAINING PROGRAM

## 2024-08-23 PROCEDURE — 99285 EMERGENCY DEPT VISIT HI MDM: CPT | Performed by: STUDENT IN AN ORGANIZED HEALTH CARE EDUCATION/TRAINING PROGRAM

## 2024-08-23 PROCEDURE — 80307 DRUG TEST PRSMV CHEM ANLYZR: CPT

## 2024-08-23 PROCEDURE — 80053 COMPREHEN METABOLIC PANEL: CPT | Performed by: STUDENT IN AN ORGANIZED HEALTH CARE EDUCATION/TRAINING PROGRAM

## 2024-08-23 PROCEDURE — 93010 ELECTROCARDIOGRAM REPORT: CPT | Performed by: INTERNAL MEDICINE

## 2024-08-23 PROCEDURE — 99285 EMERGENCY DEPT VISIT HI MDM: CPT

## 2024-08-23 PROCEDURE — 85045 AUTOMATED RETICULOCYTE COUNT: CPT

## 2024-08-23 PROCEDURE — 2500000004 HC RX 250 GENERAL PHARMACY W/ HCPCS (ALT 636 FOR OP/ED): Performed by: EMERGENCY MEDICINE

## 2024-08-23 PROCEDURE — 93010 ELECTROCARDIOGRAM REPORT: CPT | Performed by: STUDENT IN AN ORGANIZED HEALTH CARE EDUCATION/TRAINING PROGRAM

## 2024-08-23 PROCEDURE — 2500000005 HC RX 250 GENERAL PHARMACY W/O HCPCS

## 2024-08-23 PROCEDURE — 74177 CT ABD & PELVIS W/CONTRAST: CPT | Performed by: STUDENT IN AN ORGANIZED HEALTH CARE EDUCATION/TRAINING PROGRAM

## 2024-08-23 PROCEDURE — 83605 ASSAY OF LACTIC ACID: CPT

## 2024-08-23 PROCEDURE — 85025 COMPLETE CBC W/AUTO DIFF WBC: CPT

## 2024-08-23 PROCEDURE — 80349 CANNABINOIDS NATURAL: CPT

## 2024-08-23 PROCEDURE — 96374 THER/PROPH/DIAG INJ IV PUSH: CPT | Mod: 59

## 2024-08-23 RX ORDER — HYDROMORPHONE HYDROCHLORIDE 1 MG/ML
1 INJECTION, SOLUTION INTRAMUSCULAR; INTRAVENOUS; SUBCUTANEOUS
Status: COMPLETED | OUTPATIENT
Start: 2024-08-23 | End: 2024-08-23

## 2024-08-23 RX ORDER — MIDAZOLAM HYDROCHLORIDE 1 MG/ML
3 INJECTION INTRAMUSCULAR; INTRAVENOUS ONCE
Status: COMPLETED | OUTPATIENT
Start: 2024-08-23 | End: 2024-08-23

## 2024-08-23 RX ORDER — LIDOCAINE HYDROCHLORIDE 10 MG/ML
10 INJECTION INFILTRATION; PERINEURAL ONCE
Status: COMPLETED | OUTPATIENT
Start: 2024-08-23 | End: 2024-08-23

## 2024-08-23 RX ORDER — NALOXONE HYDROCHLORIDE 0.4 MG/ML
0.2 INJECTION, SOLUTION INTRAMUSCULAR; INTRAVENOUS; SUBCUTANEOUS EVERY 5 MIN PRN
Status: DISCONTINUED | OUTPATIENT
Start: 2024-08-23 | End: 2024-08-26 | Stop reason: HOSPADM

## 2024-08-23 RX ORDER — MIDAZOLAM HYDROCHLORIDE 5 MG/ML
2 INJECTION, SOLUTION INTRAMUSCULAR; INTRAVENOUS ONCE
Status: DISCONTINUED | OUTPATIENT
Start: 2024-08-23 | End: 2024-08-23

## 2024-08-23 RX ORDER — ONDANSETRON 8 MG/1
8 TABLET, ORALLY DISINTEGRATING ORAL EVERY 8 HOURS PRN
Status: DISCONTINUED | OUTPATIENT
Start: 2024-08-23 | End: 2024-08-26 | Stop reason: HOSPADM

## 2024-08-23 RX ORDER — PSEUDOEPHEDRINE HYDROCHLORIDE 60 MG/1
30 TABLET ORAL ONCE
Status: DISCONTINUED | OUTPATIENT
Start: 2024-08-23 | End: 2024-08-23

## 2024-08-23 RX ORDER — ENOXAPARIN SODIUM 100 MG/ML
40 INJECTION SUBCUTANEOUS EVERY 24 HOURS
Status: DISCONTINUED | OUTPATIENT
Start: 2024-08-23 | End: 2024-08-26 | Stop reason: HOSPADM

## 2024-08-23 RX ORDER — PSEUDOEPHEDRINE HYDROCHLORIDE 60 MG/1
60 TABLET ORAL EVERY 4 HOURS PRN
Status: DISCONTINUED | OUTPATIENT
Start: 2024-08-23 | End: 2024-08-26 | Stop reason: HOSPADM

## 2024-08-23 RX ORDER — KETOROLAC TROMETHAMINE 15 MG/ML
15 INJECTION, SOLUTION INTRAMUSCULAR; INTRAVENOUS ONCE
Status: COMPLETED | OUTPATIENT
Start: 2024-08-23 | End: 2024-08-23

## 2024-08-23 RX ORDER — POLYETHYLENE GLYCOL 3350 17 G/17G
17 POWDER, FOR SOLUTION ORAL 2 TIMES DAILY PRN
Status: DISCONTINUED | OUTPATIENT
Start: 2024-08-23 | End: 2024-08-26 | Stop reason: HOSPADM

## 2024-08-23 RX ORDER — DIPHENHYDRAMINE HCL 25 MG
25 CAPSULE ORAL EVERY 6 HOURS PRN
Status: DISCONTINUED | OUTPATIENT
Start: 2024-08-23 | End: 2024-08-26 | Stop reason: HOSPADM

## 2024-08-23 RX ORDER — FOLIC ACID 1 MG/1
1 TABLET ORAL DAILY
Status: DISCONTINUED | OUTPATIENT
Start: 2024-08-23 | End: 2024-08-26 | Stop reason: HOSPADM

## 2024-08-23 RX ORDER — MIDAZOLAM HYDROCHLORIDE 1 MG/ML
2 INJECTION INTRAMUSCULAR; INTRAVENOUS ONCE
Status: COMPLETED | OUTPATIENT
Start: 2024-08-23 | End: 2024-08-23

## 2024-08-23 RX ORDER — DOCUSATE SODIUM 100 MG/1
100 CAPSULE, LIQUID FILLED ORAL 2 TIMES DAILY PRN
Status: DISCONTINUED | OUTPATIENT
Start: 2024-08-23 | End: 2024-08-26 | Stop reason: HOSPADM

## 2024-08-23 RX ORDER — PHENYLEPHRINE HCL IN 0.9% NACL 1 MG/10 ML
100 SYRINGE (ML) INTRAVENOUS ONCE
Status: COMPLETED | OUTPATIENT
Start: 2024-08-23 | End: 2024-08-23

## 2024-08-23 RX ORDER — DIPHENHYDRAMINE HCL 25 MG
25 CAPSULE ORAL ONCE
Status: COMPLETED | OUTPATIENT
Start: 2024-08-23 | End: 2024-08-23

## 2024-08-23 RX ORDER — MIDAZOLAM HYDROCHLORIDE 1 MG/ML
INJECTION INTRAMUSCULAR; INTRAVENOUS
Status: COMPLETED
Start: 2024-08-23 | End: 2024-08-23

## 2024-08-23 RX ORDER — PSEUDOEPHEDRINE HYDROCHLORIDE 60 MG/1
60 TABLET ORAL EVERY 4 HOURS PRN
Status: DISCONTINUED | OUTPATIENT
Start: 2024-08-23 | End: 2024-08-23

## 2024-08-23 ASSESSMENT — ENCOUNTER SYMPTOMS
FEVER: 0
ABDOMINAL PAIN: 1
NAUSEA: 0
BRUISES/BLEEDS EASILY: 0
BACK PAIN: 1
DYSURIA: 0
LIGHT-HEADEDNESS: 0
DIZZINESS: 0
COUGH: 0
VOMITING: 0
WEAKNESS: 0
RHINORRHEA: 0
APPETITE CHANGE: 0
HEADACHES: 0
SHORTNESS OF BREATH: 1
CHILLS: 0
ACTIVITY CHANGE: 0
SORE THROAT: 0

## 2024-08-23 ASSESSMENT — PAIN - FUNCTIONAL ASSESSMENT
PAIN_FUNCTIONAL_ASSESSMENT: 0-10

## 2024-08-23 ASSESSMENT — COGNITIVE AND FUNCTIONAL STATUS - GENERAL
DAILY ACTIVITIY SCORE: 24
MOBILITY SCORE: 24

## 2024-08-23 ASSESSMENT — PAIN SCALES - GENERAL
PAINLEVEL_OUTOF10: 9
PAINLEVEL_OUTOF10: 8
PAINLEVEL_OUTOF10: 10 - WORST POSSIBLE PAIN
PAINLEVEL_OUTOF10: 7
PAINLEVEL_OUTOF10: 9

## 2024-08-23 ASSESSMENT — PAIN DESCRIPTION - LOCATION
LOCATION: PENIS
LOCATION: PENIS

## 2024-08-23 ASSESSMENT — LIFESTYLE VARIABLES
EVER HAD A DRINK FIRST THING IN THE MORNING TO STEADY YOUR NERVES TO GET RID OF A HANGOVER: NO
EVER FELT BAD OR GUILTY ABOUT YOUR DRINKING: NO
TOTAL SCORE: 0
HAVE PEOPLE ANNOYED YOU BY CRITICIZING YOUR DRINKING: NO
HAVE YOU EVER FELT YOU SHOULD CUT DOWN ON YOUR DRINKING: NO

## 2024-08-23 ASSESSMENT — PAIN DESCRIPTION - FREQUENCY: FREQUENCY: CONSTANT/CONTINUOUS

## 2024-08-23 ASSESSMENT — PAIN DESCRIPTION - ORIENTATION: ORIENTATION: MID

## 2024-08-23 ASSESSMENT — PAIN DESCRIPTION - PAIN TYPE: TYPE: ACUTE PAIN

## 2024-08-23 NOTE — CONSULTS
Reason For Consult  Priapism     History Of Present Illness  Joellen Narayan is a 23 y.o. male presenting with recurrent priapism. Hx of SS disease and nodular lymphocyte predominant hodgkin's lymphoma. Has needed drainage several times in the past. This current episode was been up since 1pm on 8/22, however family at bedside stated it may have begun earlier. Patient was resistant to drainage but agreeable after explanation of R/B/A. Erection woody and painful. No issues with urination. Has not tried anything. Has not been to urology outpatient yet.      Past Medical History  He has a past medical history of Corrosion of unspecified body region, unspecified degree (12/31/2014), Impetigo (01/04/2024), Personal history of diseases of the blood and blood-forming organs and certain disorders involving the immune mechanism, Personal history of other (healed) physical injury and trauma (01/03/2015), Personal history of other diseases of the circulatory system, Personal history of other diseases of the circulatory system, Rash and other nonspecific skin eruption (09/09/2014), and Sickle-cell disease with pain (Multi) (12/19/2023).    Surgical History  He has a past surgical history that includes IR CVC tunneled (6/21/2022); CT guided percutaneous biopsy LYMPH node superficial (11/18/2022); and CT guided percutaneous abdominal retroperitoneum biopsy (11/30/2023).     Social History  He reports that he has been smoking cigars. He has been exposed to tobacco smoke. He has never used smokeless tobacco. He reports that he does not drink alcohol and does not use drugs.    Family History  Family History   Problem Relation Name Age of Onset    Sickle cell trait Mother      Sickle cell trait Father      Lung cancer Brother          Allergies  Cefepime, Amoxicillin, and Ceftriaxone    Review of Systems  ROS neg x 10     Physical Exam  Moderate distress  NLB  Tachycardic   Penis woody and tender. Anatomically normal      Last  "Recorded Vitals  Blood pressure (!) 148/93, pulse 93, temperature 36.9 °C (98.4 °F), temperature source Temporal, resp. rate 20, height 1.88 m (6' 2\"), weight 69.4 kg (153 lb), SpO2 97%.       Assessment/Plan     22 yo male with recurrent ischemic priapism. Required drainage at bedside. Patient needed 5mg of versed for sedation     Procedure:   Informed consent was obtained. Patient needed versed 5mg for sedation. He was set up to telemetry.   He was prepped and draped. 25 ml of 1% lidocaine was injected for a penile/ring block. We then inserted 18 g needles in his corpora bilateral through the glans. We had immediate return dark thick blood. We sent this for a penile ABG that was consistent with ischemia. We expressed drainage and irrigated with NS. A total of 400mcg of phenylephrine was injected. At the conclusion of the reduction the penis was flaccid. We wrapped the penis.     -We will reassess in 1 hour  -If penis is still flaccid ok for discharged after an additional 30 minutes of observation   -Patient should follow up as scheduled     Gabriel Can MD    "

## 2024-08-23 NOTE — ED TRIAGE NOTES
Pt arrived to ED reporting SCC pain that started last night. He reports that his priaprism started this morning, pt. Reports 10/10 pain in his penis

## 2024-08-23 NOTE — PROGRESS NOTES
Emergency Department Transition of Care Note       Signout   I received Joellen Narayan in signout from Dr. Vega.  Please see the ED Provider Note for all HPI, PE and MDM up to the time of signout at 7am.  This is in addition to the primary record.    In brief Joellen Narayan is an 23 y.o. male presenting for priapism and RUQ/flank pain in the setting of vaso-occlusive pain crisis. PMH of nodular lymphocyte predominant Hodgkins lymphoma (NLPHL) (on rituxan/prednisone, HbSS sickle cell disease c/b dactylitis in infancy, mild splenic sequestration in 2001, , acute chest syndrome last in 2/2023, and recurrent priapism. Has nocturnal hypoxia, not on O2 at home, and choledocholithiasis s/p ERCP 7/18 with plans for cholecystectomy     At the time of signout we were awaiting:  Labs imaging and urology consult    ED Course & Medical Decision Making   Medical Decision Making:  Under my care, urology was consulted for further management of recurrent priapism.  Patient's vital signs reviewed and patient was reassessed.  Imaging ordered including CT PE to evaluate for PE in addition to CT abdomen pelvis for right upper quadrant/flank pain which is different from his usual pain, evaluating for renal infarct.  Imaging results negative for PE. CT remarkable for gallbladder stone measuring up to 1.2 x 1.1 cm. Per chart review, patient had choledocholithiasis per s/p ERCP 7/18 with plans for cholecystectomy however that was deferred at the time due to patient's stability. Recent gallbladder US on 8/21  demonstrated cholelithiasis without sonographic evidence of acute cholecystitis. Urology consulted for priapism and did bedside drainage (the resulted ABG is a Penile blood gas), 5mg of Versed given for procedure. Patient admitted to Oncology for further care.    ED Course:  Diagnoses as of 08/23/24 1216   Priapism due to sickle cell disease (Multi)   Calculus of gallbladder without cholecystitis without obstruction        Disposition   As a result of their workup, the patient will require admission to the hospital.  The patient was informed of his diagnosis.  The patient was given the opportunity to ask questions and I answered them. The patient agreed to be admitted to the hospital.    Procedures   Procedures    Patient seen and discussed with ED attending physician.    Diana Xie DO  Emergency Medicine

## 2024-08-23 NOTE — H&P
History Of Present Illness  Joellen Narayan is a 23 y.o. 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 He reports 10/10 pain in his penis, flank/abdominal pain is 8/10. He endorses some SOB without CP, wears nocturnal O2 at baseline. Feels fatigued.     Urology consulted in ED and performed reduction with 18g needles in his corpora bilateral through the glans, irrigation with NS and injection of 400mcg phenylephrine. Venous gas sent from penile drainage, c/w ischemia. He is cleared for DC from a urology standpoint, however will be admitted for further pain control.     ED Course:   CBC 14.0 / 8.3 / 22.6 / 216    / 4.4 / 105 / 24 / 8 / 0.61 / Glu 102  , tBili 8.5   CT PE negative for PE, lung consolidation  CT A/P showing constipation, no acute process        Past Medical History  He has a past medical history of Corrosion of unspecified body region, unspecified degree (12/31/2014), Impetigo (01/04/2024), Personal history of diseases of the blood and blood-forming organs and certain disorders involving the immune mechanism, Personal history of other (healed) physical injury and trauma (01/03/2015), Personal history of other diseases of the circulatory system, Personal history of other diseases of the circulatory system, Rash and other nonspecific skin eruption (09/09/2014), and Sickle-cell disease with pain (Multi) (12/19/2023).    Surgical History  He has a past surgical history that includes IR CVC tunneled (6/21/2022); CT guided percutaneous biopsy LYMPH node superficial (11/18/2022);  and CT guided percutaneous abdominal retroperitoneum biopsy (11/30/2023).    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 He has been exposed to tobacco smoke. He has never used smokeless tobacco. He reports that he does not drink alcohol and does not use drugs.     Allergies  Cefepime, Amoxicillin, and Ceftriaxone    Current Outpatient Medications   Medication Instructions    folic acid (FOLVITE) 1 mg, oral, Daily    oxyCODONE (ROXICODONE) 15 mg, oral, Every 6 hours PRN         Review of Systems   Constitutional:  Negative for activity change, appetite change, chills and fever.   HENT:  Negative for congestion, rhinorrhea and sore throat.    Respiratory:  Positive for shortness of breath. Negative for cough.    Cardiovascular:  Negative for chest pain.   Gastrointestinal:  Positive for abdominal pain. Negative for nausea and vomiting.   Genitourinary:  Positive for penile pain and penile swelling. Negative for dysuria.   Musculoskeletal:  Positive for back pain.   Neurological:  Negative for dizziness, weakness, light-headedness and headaches.   Hematological:  Does not bruise/bleed easily.        Physical Exam  Constitutional:       General: He is not in acute distress.     Appearance: He is not toxic-appearing.   HENT:      Head: Normocephalic and atraumatic.   Eyes:      General: Scleral icterus present.      Extraocular Movements: Extraocular movements intact.   Cardiovascular:      Rate and Rhythm: Normal rate and regular rhythm.   Pulmonary:      Effort: No respiratory distress.   Abdominal:      General: Abdomen is flat.       "Palpations: Abdomen is soft.      Tenderness: There is no abdominal tenderness.   Genitourinary:     Comments: Wrapped s/p drainage   Musculoskeletal:         General: No swelling or tenderness.   Skin:     Coloration: Skin is not jaundiced.      Findings: No bruising or erythema.          Last Recorded Vitals  Blood pressure 133/74, pulse 96, temperature 36.9 °C (98.4 °F), temperature source Temporal, resp. rate 16, height 1.88 m (6' 2\"), weight 69.4 kg (153 lb), SpO2 99%.         Assessment/Plan   Assessment & Plan  Sickle cell anemia with pain (Multi)      Joellen Narayan is a 23 y.o. 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 Urology consulted in ED and performed reduction with 18 g needles in his corpora bilateral through the glans, irrigation with NS and injection of 400mcg phenylephrine. Venous gas sent from penile drainage, c/w ischemia. He is cleared for DC from a urology standpoint, however will be admitted for further pain control. CT PE (8/23) negative for PE, lung consolidation. CT A/P (8/23) showing constipation, no acute process. Started on dilaudid 2mg q2h PRN severe pain. DC pending pain control.      # Hgb SS with severe pain  - OARRS reviewed, no aberrancies  - No current care path  - Hgb baseline ~8.5, Hgb 8.3 (8/23)  - Tbili baseline fluctuates ~5-13, Tbili 8.5 (8/23)  - LDH baseline ~500,  (8/23)  - Mild leukocytosis noted on admission consistent with previous admissions. WBC 14.0k (8/23); likely reactive as pt afebrile with no s/sx of infectious process  - Pt with " SOB, however consistent with pt's baseline presentation of sickle cell crisis. In the absence of imaging findings c/f infection or ACS, no wheezing, and no fevers-- low c/f ACS at this time, will monitor  - CT PE (8/23) negative for PE, lung consolidation  - CT A/P (8/23) for flank pain showing constipation, no acute process   - On admit started IV dilaudid 2mg q2hrs PRN severe pain (8/23-current)  - Continue folic acid 1mg daily  - Utox (8/11): pending  - Hgb S (8/11): pending  - PO Zofran PRN for opioid-induced nausea, PO Benadryl PRN for opioid-induced pruritus, Bowel regimen for opioid-induced constipation with DocuSenna 2tabs BID and Miralax daily     # Priapism  - Presented to the ED with priapism >12hrs  - Urology consulted in ED and performed reduction with 18 g needles in his corpora bilateral through the glans, irrigation with NS and injection of 400mcg phenylephrine. Venous gas sent from penile drainage, c/w ischemia  - He is cleared for DC from a urology standpoint  - Sudafed daily PRN priapism ordered  - Pt missed urology FUV 7/2     # Nodular lymphocyte predominant Hodgkins lymphoma (NLPHL)   - Primary Oncologist: Dr. Stoll  - Enlarged lymph nodes noted 4/1/22  - RUQ US (11/14/22) with mildly enlarged LNs in the region of the kavin hepatis  - MRI liver (11/16/22) showed re-demonstration of bulky retroperitoneal lymphadenopathy and kavin hepatic lymphadenopathy    - (11/18/22) lymph node biopsy showed atypical lymphoid infiltrate. Reviewed by Hemepath board, insufficient for lymphoma diagnosis  - PET/CT (12/6/22) showing retroperitoneal lymphadenopathy  - Followed up with Dr. Stoll (12/16/22) with plan for surg/onc consult for large tissue bx but patient missed apt and was lost to follow up  - Requested new apt with Dr. Ronnie Marte 6/19/23, patient was not seen and lost to follow up  - CT a/p (11/28/23) increased size of retroperitoneal lymph nodes reflecting extramedullary hematopoiesis I/s/o sickle  "cell vs lymphoma  - Paraaortic LN bx via IR (11/30/23) consistent with NLPHL. Flow: no clonal B cell or T cell population identified, lymphocyte 95%, CD3+CD4+ 68%, CD3+CD8+ 7%, CD19+ 24%  - Elevated LDH/bili partially from sickle cell disease   - Chemotherapy (R-CHOP) was discussed with primary oncologist Dr. Stoll, and decision was made to simplify his chemotherapy to Rituxan and prednisone q3 weeks mainly due to frequent sickle cell crisis  - Current chemo regimen: rituxan and prednisone q3 weeks (C1 1/18/24, C2 2/8/24, Missed C3 d/t sickle cell pain crisis, C4 4/4/24, C5 5/16/24, C6 6/7/24)  - Per pt no longer taking Acyclovir 400mg BID prophy   - PET CT (7/25) overall showing great response to treatment with persistent residual viable disease involving a left common iliac node  - Last FUV with Dr. Stoll 7/25/24 rec RTC in 2 months (next FUV scheduled for 9/19)    # Choledocholithiasis  - s/p ERCP 7/18/24 with a biliary sphincterotomy where sludge and stones were removed, achieving complete clearance  - Seen by gen surg 8/8/24 Dr. Dove who rec lap cholecystectomy d/t the chance of recurrence of choledocholithiasis  - Surgery has yet to be scheduled  - Tbili 8.5 (8/23) which is markedly improved, recent peak at 52.8 prior to ERCP during recent hospital admission     # Hx of nocturnal oxygen dependence and hypoxia on room air  - Has not had home oxygen for 2-3 years   - Wean as able, encourage incentive spirometry  - Pulm FUV 8/8- \"Given his history of sickle cell disease, it is important to ensure he has formal sleep testing to look at desaturations and presence of sleep apnea despite not having a convincing history/body habitus typical for suspecting sleep apnea- patients with sickle cell have a higher prevalence of sleep disorders including nocturnal hypoxemia\"--> rec sleep referral for formal testing if deemed appropriate, ABG and O2 destat testing, and TTE with bubble study  - TTE ordered 8/23 while " inpatient, not performed 8/11         DVT prophy: Lovenox subcutaneous, SCDs, encourage ambulation     DISPO:  - Full code, confirmed on admit  - DC pending improvement in pain  - SUSIE Narayan (Parent) [phone]  - Dr. Stoll FUV 9/19, Sickle Cell FUV requested/pending       I spent 120 minutes in the professional and overall care of this patient.      Mary Moody, APRSANDIP-CNP

## 2024-08-23 NOTE — ED PROVIDER NOTES
Emergency Department Provider Note        History of Present Illness     History provided by: Patient  Limitations to History: None  External Records Reviewed with Brief Summary:  Multiple recent discharge summaries showing that the patient was recently in the ED for episodes of sickle cell pain crisis and priapism.  He has recently had success stopping his priapism by oral treatment with Sudafed, other times he has required decompression    HPI:  Joellen Narayan is a 23 y.o. male with PMH of sickle cell, lymphoma (not currently receiving treatment) and left heart failure presenting today for his usual pain in his right flank and priapism since 1 PM on .  He reports that his pain is similar to his usual sickle cell pain.   Patient is experiencing some shortness of breath without chest pain.  He is having right abdominal/flank pain without pain to the rest of his abdomen.  He reports feeling generally weak and reports tingling throughout.  He is experiencing mild nausea without vomiting or changes in bowel movements.    Physical Exam   Triage vitals:  T 36.9 °C (98.4 °F)    /58  RR 16  O2 (!) 88 % None (Room air)    General: Awake, alert, appears to be in acute pain 2/2 priapism  HENT: Normo-cephalic, atraumatic. No stridor  CV: Regular rate, regular rhythm.   Resp: speaking in full sentences.  Clear to auscultation bilaterally, no chest tenderness  GI: Soft, non-distended, non-tender. No rebound or guarding.  : Penis is erect, without discoloration, warm and tender to touch.  Scrotum does not appear to be enlarged/edematous  MSK/Extremities: No gross bony deformities. Moving all extremities  Skin: Warm. Appropriate color  Neuro: Alert. Oriented. Face symmetric. Speech is fluent.  Gross strength and sensation intact in b/l UE and LEs  Psych: Appropriate mood and affect    Medical Decision Making & ED Course   Medical Decision Makin y.o. male patient was started on his usual pain control  regimen with every hour doses of Dilaudid 1 mg, Benadryl, IV Toradol.  Additionally given that he had recent success using pseudoephedrine to ablate his priapism he requested repeating this instead of moving straight to decompression.  Discussed with patient that not proceeding with decompression immediately may result in permanent damage or dysfunction of penis or testes, patient would still like to move forward with pseudoephedrine prior to decompression.    Given reported shortness of breath and oxygen requirement we discussed with oncoming team to initiate labs, at the time of writing this note oncoming team has ordered new blood work and CXR    Patient is inconsistently requiring oxygen and was satting well on room air during triage.  The patient may be developing an oxygen requirement or the requirement may be 2/2 irregular breathing pattern due to pain  ----    Differential diagnoses considered include but are not limited to: Sickle cell pain and priapism, shortness of breath may be due to an acute cardiopulmonary process or secondary to his pain and associated change in breathing pattern     Social Determinants of Health which Significantly Impact Care: None identified     EKG Independent Interpretation:  EKG obtained on 8/23 at 0 617 shows normal sinus rhythm with ventricular rate of 92 bpm.  Upright axis with evidence of ventricular hypertrophy L >R.  MA, QRS, QTc intervals are normal.  No evidence of ischemia.  Some T waves appear to have increased amplitude but is not abnormal when compared with multiple previous examinations.  Overall appears to be at the patient's baseline when compared with August 20, August 11, and July 11    Independent Result Review and Interpretation: Relevant laboratory and radiographic results were reviewed and independently interpreted by myself.  As necessary, they are commented on in the ED Course.    Chronic conditions affecting the patient's care: As documented above in  MDM    The patient was discussed with the following consultants/services: None    Care Considerations: As documented above in MDM    ED Course:     Disposition   Patient was signed out to Dr. Xie at 0 700 pending completion of their work-up.  Please see the next provider's transition of care note for the remainder of the patient's care.     Procedures   Procedures    Patient seen and discussed with ED attending physician.    Shad Vega MD  Emergency Medicine       Shad Vega MD  Resident  08/23/24 075

## 2024-08-24 ENCOUNTER — APPOINTMENT (OUTPATIENT)
Dept: CARDIOLOGY | Facility: HOSPITAL | Age: 24
DRG: 812 | End: 2024-08-24
Payer: COMMERCIAL

## 2024-08-24 LAB
ALBUMIN SERPL BCP-MCNC: 4.3 G/DL (ref 3.4–5)
ALP SERPL-CCNC: 116 U/L (ref 33–120)
ALT SERPL W P-5'-P-CCNC: 22 U/L (ref 10–52)
ANION GAP SERPL CALC-SCNC: 11 MMOL/L (ref 10–20)
AORTIC VALVE PEAK VELOCITY: 1.68 M/S
AST SERPL W P-5'-P-CCNC: 48 U/L (ref 9–39)
AV PEAK GRADIENT: 11.3 MMHG
AVA (PEAK VEL): 3.5 CM2
BASOPHILS # BLD AUTO: 0.05 X10*3/UL (ref 0–0.1)
BASOPHILS NFR BLD AUTO: 0.5 %
BILIRUB SERPL-MCNC: 8.5 MG/DL (ref 0–1.2)
BUN SERPL-MCNC: 7 MG/DL (ref 6–23)
CALCIUM SERPL-MCNC: 9.2 MG/DL (ref 8.6–10.6)
CARDIAC TROPONIN I PNL SERPL HS: 6 NG/L (ref 0–53)
CHLORIDE SERPL-SCNC: 103 MMOL/L (ref 98–107)
CO2 SERPL-SCNC: 28 MMOL/L (ref 21–32)
CREAT SERPL-MCNC: 0.55 MG/DL (ref 0.5–1.3)
EGFRCR SERPLBLD CKD-EPI 2021: >90 ML/MIN/1.73M*2
EJECTION FRACTION APICAL 4 CHAMBER: 61.8
EJECTION FRACTION: 63 %
EOSINOPHIL # BLD AUTO: 0.43 X10*3/UL (ref 0–0.7)
EOSINOPHIL NFR BLD AUTO: 4.2 %
ERYTHROCYTE [DISTWIDTH] IN BLOOD BY AUTOMATED COUNT: 21.5 % (ref 11.5–14.5)
GLUCOSE SERPL-MCNC: 70 MG/DL (ref 74–99)
HCT VFR BLD AUTO: 20.5 % (ref 41–52)
HGB BLD-MCNC: 7.4 G/DL (ref 13.5–17.5)
HGB RETIC QN: 32 PG (ref 28–38)
HOLD SPECIMEN: NORMAL
HOWELL-JOLLY BOD BLD QL SMEAR: PRESENT
IMM GRANULOCYTES # BLD AUTO: 0.12 X10*3/UL (ref 0–0.7)
IMM GRANULOCYTES NFR BLD AUTO: 1.2 % (ref 0–0.9)
IMMATURE RETIC FRACTION: 25.7 %
LDH SERPL L TO P-CCNC: 508 U/L (ref 84–246)
LEFT VENTRICLE INTERNAL DIMENSION DIASTOLE: 5.8 CM (ref 3.5–6)
LEFT VENTRICULAR OUTFLOW TRACT DIAMETER: 2.4 CM
LYMPHOCYTES # BLD AUTO: 2.1 X10*3/UL (ref 1.2–4.8)
LYMPHOCYTES NFR BLD AUTO: 20.7 %
MCH RBC QN AUTO: 30.5 PG (ref 26–34)
MCHC RBC AUTO-ENTMCNC: 36.1 G/DL (ref 32–36)
MCV RBC AUTO: 84 FL (ref 80–100)
MITRAL VALVE E/A RATIO: 2.08
MONOCYTES # BLD AUTO: 1.22 X10*3/UL (ref 0.1–1)
MONOCYTES NFR BLD AUTO: 12 %
NEUTROPHILS # BLD AUTO: 6.24 X10*3/UL (ref 1.2–7.7)
NEUTROPHILS NFR BLD AUTO: 61.4 %
NRBC BLD-RTO: 2.2 /100 WBCS (ref 0–0)
PLATELET # BLD AUTO: 227 X10*3/UL (ref 150–450)
POLYCHROMASIA BLD QL SMEAR: NORMAL
POTASSIUM SERPL-SCNC: 4.3 MMOL/L (ref 3.5–5.3)
PROT SERPL-MCNC: 6.4 G/DL (ref 6.4–8.2)
RBC # BLD AUTO: 2.43 X10*6/UL (ref 4.5–5.9)
RBC MORPH BLD: NORMAL
RETICS #: 0.63 X10*6/UL (ref 0.02–0.12)
RETICS/RBC NFR AUTO: 26 % (ref 0.5–2)
RIGHT VENTRICLE FREE WALL PEAK S': 17.4 CM/S
SICKLE CELLS BLD QL SMEAR: NORMAL
SODIUM SERPL-SCNC: 138 MMOL/L (ref 136–145)
TARGETS BLD QL SMEAR: NORMAL
TRICUSPID ANNULAR PLANE SYSTOLIC EXCURSION: 3 CM
WBC # BLD AUTO: 10.2 X10*3/UL (ref 4.4–11.3)

## 2024-08-24 PROCEDURE — 1170000001 HC PRIVATE ONCOLOGY ROOM DAILY

## 2024-08-24 PROCEDURE — 93306 TTE W/DOPPLER COMPLETE: CPT | Performed by: INTERNAL MEDICINE

## 2024-08-24 PROCEDURE — 83615 LACTATE (LD) (LDH) ENZYME: CPT

## 2024-08-24 PROCEDURE — 36415 COLL VENOUS BLD VENIPUNCTURE: CPT

## 2024-08-24 PROCEDURE — 85045 AUTOMATED RETICULOCYTE COUNT: CPT

## 2024-08-24 PROCEDURE — 2500000001 HC RX 250 WO HCPCS SELF ADMINISTERED DRUGS (ALT 637 FOR MEDICARE OP)

## 2024-08-24 PROCEDURE — 80053 COMPREHEN METABOLIC PANEL: CPT

## 2024-08-24 PROCEDURE — 93306 TTE W/DOPPLER COMPLETE: CPT

## 2024-08-24 PROCEDURE — 2500000004 HC RX 250 GENERAL PHARMACY W/ HCPCS (ALT 636 FOR OP/ED)

## 2024-08-24 PROCEDURE — 99233 SBSQ HOSP IP/OBS HIGH 50: CPT

## 2024-08-24 PROCEDURE — 85025 COMPLETE CBC W/AUTO DIFF WBC: CPT

## 2024-08-24 SDOH — SOCIAL STABILITY: SOCIAL INSECURITY: ARE YOU OR HAVE YOU BEEN THREATENED OR ABUSED PHYSICALLY, EMOTIONALLY, OR SEXUALLY BY ANYONE?: NO

## 2024-08-24 SDOH — SOCIAL STABILITY: SOCIAL INSECURITY: WERE YOU ABLE TO COMPLETE ALL THE BEHAVIORAL HEALTH SCREENINGS?: YES

## 2024-08-24 SDOH — SOCIAL STABILITY: SOCIAL INSECURITY: DO YOU FEEL ANYONE HAS EXPLOITED OR TAKEN ADVANTAGE OF YOU FINANCIALLY OR OF YOUR PERSONAL PROPERTY?: NO

## 2024-08-24 SDOH — SOCIAL STABILITY: SOCIAL INSECURITY: HAVE YOU HAD ANY THOUGHTS OF HARMING ANYONE ELSE?: NO

## 2024-08-24 SDOH — SOCIAL STABILITY: SOCIAL INSECURITY: HAS ANYONE EVER THREATENED TO HURT YOUR FAMILY OR YOUR PETS?: NO

## 2024-08-24 SDOH — SOCIAL STABILITY: SOCIAL INSECURITY: DO YOU FEEL UNSAFE GOING BACK TO THE PLACE WHERE YOU ARE LIVING?: NO

## 2024-08-24 SDOH — SOCIAL STABILITY: SOCIAL INSECURITY: ARE THERE ANY APPARENT SIGNS OF INJURIES/BEHAVIORS THAT COULD BE RELATED TO ABUSE/NEGLECT?: NO

## 2024-08-24 SDOH — SOCIAL STABILITY: SOCIAL INSECURITY: ABUSE: ADULT

## 2024-08-24 SDOH — SOCIAL STABILITY: SOCIAL INSECURITY: HAVE YOU HAD THOUGHTS OF HARMING ANYONE ELSE?: NO

## 2024-08-24 SDOH — SOCIAL STABILITY: SOCIAL INSECURITY: DOES ANYONE TRY TO KEEP YOU FROM HAVING/CONTACTING OTHER FRIENDS OR DOING THINGS OUTSIDE YOUR HOME?: NO

## 2024-08-24 ASSESSMENT — PAIN - FUNCTIONAL ASSESSMENT

## 2024-08-24 ASSESSMENT — PAIN SCALES - GENERAL
PAINLEVEL_OUTOF10: 9
PAINLEVEL_OUTOF10: 8
PAINLEVEL_OUTOF10: 9
PAINLEVEL_OUTOF10: 8
PAINLEVEL_OUTOF10: 7
PAINLEVEL_OUTOF10: 8
PAINLEVEL_OUTOF10: 7
PAINLEVEL_OUTOF10: 0 - NO PAIN
PAINLEVEL_OUTOF10: 8
PAINLEVEL_OUTOF10: 9
PAINLEVEL_OUTOF10: 8
PAINLEVEL_OUTOF10: 9
PAINLEVEL_OUTOF10: 8
PAINLEVEL_OUTOF10: 6
PAINLEVEL_OUTOF10: 8

## 2024-08-24 ASSESSMENT — LIFESTYLE VARIABLES
SKIP TO QUESTIONS 9-10: 1
HOW MANY STANDARD DRINKS CONTAINING ALCOHOL DO YOU HAVE ON A TYPICAL DAY: PATIENT DOES NOT DRINK
AUDIT-C TOTAL SCORE: 0
HOW OFTEN DO YOU HAVE 6 OR MORE DRINKS ON ONE OCCASION: NEVER
HOW OFTEN DO YOU HAVE A DRINK CONTAINING ALCOHOL: NEVER
AUDIT-C TOTAL SCORE: 0

## 2024-08-24 ASSESSMENT — COGNITIVE AND FUNCTIONAL STATUS - GENERAL
DAILY ACTIVITIY SCORE: 24
PATIENT BASELINE BEDBOUND: NO
DAILY ACTIVITIY SCORE: 24
MOBILITY SCORE: 24
MOBILITY SCORE: 24

## 2024-08-24 ASSESSMENT — ACTIVITIES OF DAILY LIVING (ADL)
PATIENT'S MEMORY ADEQUATE TO SAFELY COMPLETE DAILY ACTIVITIES?: YES
LACK_OF_TRANSPORTATION: PATIENT DECLINED
FEEDING YOURSELF: INDEPENDENT
BATHING: INDEPENDENT
TOILETING: INDEPENDENT
HEARING - LEFT EAR: FUNCTIONAL
WALKS IN HOME: INDEPENDENT
HEARING - RIGHT EAR: FUNCTIONAL
ADEQUATE_TO_COMPLETE_ADL: YES
GROOMING: INDEPENDENT
DRESSING YOURSELF: INDEPENDENT
JUDGMENT_ADEQUATE_SAFELY_COMPLETE_DAILY_ACTIVITIES: YES

## 2024-08-24 ASSESSMENT — PAIN DESCRIPTION - LOCATION: LOCATION: GROIN

## 2024-08-24 NOTE — PROGRESS NOTES
"Joellen Narayan is a 23 y.o. male on day 0 of admission presenting with Sickle cell anemia with pain (Multi).    Subjective   Overnight had some chest pain, now completely resolved. Cardiac workup and CXR negative. Denies N/V/D/C, chest pain, cough, shortness of breath.  Pain is in his groin and back. Otherwise eating ok and having Bms.     Plan to rotate PO and IV tomorrow.        Objective     Physical Exam  Constitutional:       General: He is not in acute distress.     Appearance: He is not toxic-appearing.   HENT:      Head: Normocephalic and atraumatic.   Eyes:      General: Scleral icterus present.      Extraocular Movements: Extraocular movements intact.   Cardiovascular:      Rate and Rhythm: Normal rate and regular rhythm.   Pulmonary:      Effort: No respiratory distress.   Abdominal:      General: Abdomen is flat.      Palpations: Abdomen is soft.      Tenderness: There is no abdominal tenderness.   Genitourinary:     Penis: Normal.    Musculoskeletal:         General: No swelling or tenderness.   Skin:     Coloration: Skin is not jaundiced.      Findings: No bruising or erythema.   Neurological:      Mental Status: He is oriented to person, place, and time. Mental status is at baseline.         Last Recorded Vitals  Blood pressure 131/71, pulse 71, temperature 37 °C (98.6 °F), temperature source Temporal, resp. rate 18, height 1.88 m (6' 2\"), weight 69.4 kg (153 lb), SpO2 93%.  Intake/Output last 3 Shifts:  I/O last 3 completed shifts:  In: - (0 mL/kg)   Out: 950 (13.7 mL/kg) [Urine:950 (0.4 mL/kg/hr)]  Weight: 69.4 kg       Assessment/Plan   Assessment & Plan  Sickle cell anemia with pain (Multi)    Sickle cell crisis (Multi)    Joellen Narayan is a 23 y.o. 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 Urology consulted in ED and performed reduction with 18 g needles in his corpora bilateral through the glans, irrigation with NS and injection of 400mcg phenylephrine. Venous gas sent from penile drainage, c/w ischemia. He is cleared for DC from a urology standpoint, however will be admitted for further pain control. CT PE (8/23) negative for PE, lung consolidation. CT A/P (8/23) showing constipation, no acute process. Started on dilaudid 2mg q2h PRN severe pain. Plan to rotate PO and IV 8/25, DC pending pain control.      # Hgb SS with severe pain  - OARRS reviewed, no aberrancies  - No current care path  - Hgb baseline ~8.5, Hgb 8.3 (8/23)  - Tbili baseline fluctuates ~5-13, Tbili 8.5 (8/23)  - LDH baseline ~500,  (8/23)  - Mild leukocytosis noted on admission consistent with previous admissions. WBC 14.0k (8/23); likely reactive as pt afebrile with no s/sx of infectious process  - Pt with SOB, however consistent with pt's baseline presentation of sickle cell crisis. In the absence of imaging findings c/f infection or ACS, no wheezing, and no fevers-- low c/f ACS at this time, will monitor  - CT PE (8/23) negative for PE, lung consolidation  - CT A/P (8/23) for flank pain showing constipation, no acute process   - CXR 8/23 no acute process, hs-Trop negative   - On admit started IV dilaudid 2mg q2hrs PRN severe pain (8/23-current)  - Continue folic acid 1mg daily  - Utox (8/11) positive for cannabis   - Hgb S (8/11): pending  - PO Zofran PRN for opioid-induced nausea, PO Benadryl PRN for opioid-induced pruritus, Bowel regimen for opioid-induced constipation with DocuSenna 2tabs BID and Miralax daily      # Priapism  - Presented to the ED with priapism >12hrs  - Urology consulted in ED and  performed reduction with 18 g needles in his corpora bilateral through the glans, irrigation with NS and injection of 400mcg phenylephrine. Venous gas sent from penile drainage, c/w ischemia  - He is cleared for DC from a urology standpoint  - Sudafed daily PRN priapism ordered  - Pt missed urology FUV 7/2, requested new appt      # Nodular lymphocyte predominant Hodgkins lymphoma (NLPHL)   - Primary Oncologist: Dr. Stoll  - Enlarged lymph nodes noted 4/1/22  - RUQ US (11/14/22) with mildly enlarged LNs in the region of the kavin hepatis  - MRI liver (11/16/22) showed re-demonstration of bulky retroperitoneal lymphadenopathy and kavin hepatic lymphadenopathy    - (11/18/22) lymph node biopsy showed atypical lymphoid infiltrate. Reviewed by Hemepath board, insufficient for lymphoma diagnosis  - PET/CT (12/6/22) showing retroperitoneal lymphadenopathy  - Followed up with Dr. Stoll (12/16/22) with plan for surg/onc consult for large tissue bx but patient missed apt and was lost to follow up  - Requested new apt with Dr. Ronnie Marte 6/19/23, patient was not seen and lost to follow up  - CT a/p (11/28/23) increased size of retroperitoneal lymph nodes reflecting extramedullary hematopoiesis I/s/o sickle cell vs lymphoma  - Paraaortic LN bx via IR (11/30/23) consistent with NLPHL. Flow: no clonal B cell or T cell population identified, lymphocyte 95%, CD3+CD4+ 68%, CD3+CD8+ 7%, CD19+ 24%  - Elevated LDH/bili partially from sickle cell disease   - Chemotherapy (R-CHOP) was discussed with primary oncologist Dr. Stoll, and decision was made to simplify his chemotherapy to Rituxan and prednisone q3 weeks mainly due to frequent sickle cell crisis  - Current chemo regimen: rituxan and prednisone q3 weeks (C1 1/18/24, C2 2/8/24, Missed C3 d/t sickle cell pain crisis, C4 4/4/24, C5 5/16/24, C6 6/7/24)  - Per pt no longer taking Acyclovir 400mg BID prophy   - PET CT (7/25) overall showing great response to treatment with  "persistent residual viable disease involving a left common iliac node  - Last FUV with Dr. Stoll 7/25/24 rec RTC in 2 months (next FUV scheduled for 9/19)     # Choledocholithiasis  - s/p ERCP 7/18/24 with a biliary sphincterotomy where sludge and stones were removed, achieving complete clearance  - Seen by gen surg 8/8/24 Dr. Dove who rec lap cholecystectomy d/t the chance of recurrence of choledocholithiasis  - Surgery has yet to be scheduled  - Tbili 8.5 (8/23) which is markedly improved, recent peak at 52.8 prior to ERCP during recent hospital admission     # Hx of nocturnal oxygen dependence and hypoxia on room air  - Has not had home oxygen for 2-3 years   - Wean as able, encourage incentive spirometry  - Pulm FUV 8/8- \"Given his history of sickle cell disease, it is important to ensure he has formal sleep testing to look at desaturations and presence of sleep apnea despite not having a convincing history/body habitus typical for suspecting sleep apnea- patients with sickle cell have a higher prevalence of sleep disorders including nocturnal hypoxemia\"--> rec sleep referral for formal testing if deemed appropriate, ABG and O2 destat testing, and TTE with bubble study  - TTE 8/23 showing EF 60-65%, severely dilated LV and LA, + intrapulmonary shunting        DVT prophy: Lovenox subcutaneous, SCDs, encourage ambulation     DISPO:  - Full code, confirmed on admit  - DC pending improvement in pain  - SUSIE Narayan (Parent) [phone]  - Dr. Stoll FUV 9/19, Sickle Cell FUV requested/pending, urology FUV requested       I spent >60 minutes in the professional and overall care of this patient.      Mary Moody, APRN-CNP      "

## 2024-08-24 NOTE — CARE PLAN
Problem: Pain  Goal: Takes deep breaths with improved pain control throughout the shift  Outcome: Progressing  Goal: Turns in bed with improved pain control throughout the shift  Outcome: Progressing  Goal: Walks with improved pain control throughout the shift  Outcome: Progressing  Goal: Performs ADL's with improved pain control throughout shift  Outcome: Progressing  Goal: Participates in PT with improved pain control throughout the shift  Outcome: Progressing  Goal: Free from opioid side effects throughout the shift  Outcome: Progressing  Goal: Free from acute confusion related to pain meds throughout the shift  Outcome: Progressing       The clinical goals for the shift include pt will rate pain less than 8/10 throughout shift    Over the shift, the patient remained safe and free from injury. Had pain in chest, medicated PRN. Remained VSS throughout shift. No acute events overnight

## 2024-08-24 NOTE — CARE PLAN
Patient afebrile. Vitals stable. Pain slightly better. No nausea or vomiting. No issues. Will continue to monitor

## 2024-08-24 NOTE — PROGRESS NOTES
Child Life Assessment:   Reason for Consult  Discipline: Child Life Specialist  Reason for Consult: Coping skill development/planning  Referral Source: Self  Total Time Spent (min): 15 minutes    Patient Intervention(s)  Type of Intervention Performed: Healing environment interventions  Healing Environment Intervention(s): Assessment, Resources provided, Opportunity for choice and control, Coping plan implementation, Coping skill development/planning    Evaluation  Evaluation/Plan of Care: Patient/family receptive    Session Details: Certified Child Life Specialist (CCLS) familiar with patient from interactions during numerous admissions.  Patient shared that his pain was better than the previous day and that he had been doing well in between admissions.  During this interaction, patient chose to try one of the coping tools CCLS offers each admission.  CCLS led patient through a progressive muscle relaxation (PMR) script to promote full body relaxation.  CCLS and patient discussed the various uses of PMR, including to reduce stress and for non-pharmacological pain management tool.  Patient shared that he planned to use it in the future.  Patient denied additional needs at this time.  Child life available as needed throughout patient's admission.    FINESSE Stephens, CCLS  Epic Secure Chat

## 2024-08-25 VITALS
WEIGHT: 153 LBS | HEIGHT: 74 IN | TEMPERATURE: 98.4 F | OXYGEN SATURATION: 95 % | BODY MASS INDEX: 19.64 KG/M2 | RESPIRATION RATE: 16 BRPM | HEART RATE: 81 BPM | SYSTOLIC BLOOD PRESSURE: 115 MMHG | DIASTOLIC BLOOD PRESSURE: 61 MMHG

## 2024-08-25 LAB
ALBUMIN SERPL BCP-MCNC: 4.5 G/DL (ref 3.4–5)
ALP SERPL-CCNC: 137 U/L (ref 33–120)
ALT SERPL W P-5'-P-CCNC: 25 U/L (ref 10–52)
ANION GAP SERPL CALC-SCNC: 12 MMOL/L (ref 10–20)
AST SERPL W P-5'-P-CCNC: 55 U/L (ref 9–39)
BASOPHILS # BLD AUTO: 0.06 X10*3/UL (ref 0–0.1)
BASOPHILS NFR BLD AUTO: 0.5 %
BILIRUB SERPL-MCNC: 9.1 MG/DL (ref 0–1.2)
BUN SERPL-MCNC: 7 MG/DL (ref 6–23)
CALCIUM SERPL-MCNC: 9.6 MG/DL (ref 8.6–10.6)
CHLORIDE SERPL-SCNC: 101 MMOL/L (ref 98–107)
CO2 SERPL-SCNC: 28 MMOL/L (ref 21–32)
CREAT SERPL-MCNC: 0.63 MG/DL (ref 0.5–1.3)
EGFRCR SERPLBLD CKD-EPI 2021: >90 ML/MIN/1.73M*2
EOSINOPHIL # BLD AUTO: 0.62 X10*3/UL (ref 0–0.7)
EOSINOPHIL NFR BLD AUTO: 5.2 %
ERYTHROCYTE [DISTWIDTH] IN BLOOD BY AUTOMATED COUNT: 22.5 % (ref 11.5–14.5)
GLUCOSE SERPL-MCNC: 76 MG/DL (ref 74–99)
HCT VFR BLD AUTO: 22.8 % (ref 41–52)
HGB BLD-MCNC: 8.3 G/DL (ref 13.5–17.5)
HGB RETIC QN: 32 PG (ref 28–38)
HOWELL-JOLLY BOD BLD QL SMEAR: PRESENT
IMM GRANULOCYTES # BLD AUTO: 0.26 X10*3/UL (ref 0–0.7)
IMM GRANULOCYTES NFR BLD AUTO: 2.2 % (ref 0–0.9)
IMMATURE RETIC FRACTION: 27.7 %
LDH SERPL L TO P-CCNC: 518 U/L (ref 84–246)
LYMPHOCYTES # BLD AUTO: 2.26 X10*3/UL (ref 1.2–4.8)
LYMPHOCYTES NFR BLD AUTO: 18.8 %
MCH RBC QN AUTO: 31.1 PG (ref 26–34)
MCHC RBC AUTO-ENTMCNC: 36.4 G/DL (ref 32–36)
MCV RBC AUTO: 85 FL (ref 80–100)
MONOCYTES # BLD AUTO: 1.21 X10*3/UL (ref 0.1–1)
MONOCYTES NFR BLD AUTO: 10.1 %
NEUTROPHILS # BLD AUTO: 7.58 X10*3/UL (ref 1.2–7.7)
NEUTROPHILS NFR BLD AUTO: 63.2 %
NRBC BLD-RTO: 3.9 /100 WBCS (ref 0–0)
PLATELET # BLD AUTO: 268 X10*3/UL (ref 150–450)
POLYCHROMASIA BLD QL SMEAR: NORMAL
POTASSIUM SERPL-SCNC: 4.4 MMOL/L (ref 3.5–5.3)
PROT SERPL-MCNC: 6.8 G/DL (ref 6.4–8.2)
RBC # BLD AUTO: 2.67 X10*6/UL (ref 4.5–5.9)
RBC MORPH BLD: NORMAL
RETICS #: 0.75 X10*6/UL (ref 0.02–0.12)
RETICS/RBC NFR AUTO: 28.1 % (ref 0.5–2)
SICKLE CELLS BLD QL SMEAR: NORMAL
SODIUM SERPL-SCNC: 137 MMOL/L (ref 136–145)
TARGETS BLD QL SMEAR: NORMAL
WBC # BLD AUTO: 12 X10*3/UL (ref 4.4–11.3)

## 2024-08-25 PROCEDURE — 2500000004 HC RX 250 GENERAL PHARMACY W/ HCPCS (ALT 636 FOR OP/ED)

## 2024-08-25 PROCEDURE — 2500000001 HC RX 250 WO HCPCS SELF ADMINISTERED DRUGS (ALT 637 FOR MEDICARE OP)

## 2024-08-25 PROCEDURE — 99233 SBSQ HOSP IP/OBS HIGH 50: CPT

## 2024-08-25 PROCEDURE — 85025 COMPLETE CBC W/AUTO DIFF WBC: CPT

## 2024-08-25 PROCEDURE — 36415 COLL VENOUS BLD VENIPUNCTURE: CPT

## 2024-08-25 PROCEDURE — 85045 AUTOMATED RETICULOCYTE COUNT: CPT

## 2024-08-25 PROCEDURE — 1170000001 HC PRIVATE ONCOLOGY ROOM DAILY

## 2024-08-25 PROCEDURE — 80053 COMPREHEN METABOLIC PANEL: CPT

## 2024-08-25 PROCEDURE — 83615 LACTATE (LD) (LDH) ENZYME: CPT

## 2024-08-25 RX ORDER — FOLIC ACID 1 MG/1
1 TABLET ORAL DAILY
Qty: 30 TABLET | Refills: 0 | Status: SHIPPED | OUTPATIENT
Start: 2024-08-25 | End: 2024-08-26

## 2024-08-25 ASSESSMENT — COGNITIVE AND FUNCTIONAL STATUS - GENERAL
DAILY ACTIVITIY SCORE: 24
MOBILITY SCORE: 24

## 2024-08-25 ASSESSMENT — PAIN - FUNCTIONAL ASSESSMENT
PAIN_FUNCTIONAL_ASSESSMENT: 0-10

## 2024-08-25 ASSESSMENT — PAIN SCALES - GENERAL
PAINLEVEL_OUTOF10: 8
PAINLEVEL_OUTOF10: 9
PAINLEVEL_OUTOF10: 7
PAINLEVEL_OUTOF10: 9
PAINLEVEL_OUTOF10: 8
PAINLEVEL_OUTOF10: 7

## 2024-08-25 ASSESSMENT — PAIN DESCRIPTION - LOCATION
LOCATION: GROIN

## 2024-08-25 NOTE — PROGRESS NOTES
"Joellen Narayan is a 23 y.o. male on day 1 of admission presenting with Sickle cell anemia with pain (Multi).    Subjective   Pt seen at bedside this AM. Reports pain is better overall, now mostly in groin but also improving since he was tapped. We discussed adding his home oxy for pain mgmt and reserving IV dilaudid for breakthrough, he is agreeable. If pain better, plan for discharge tomorrow.     Denies fever, chills, chest pain, SOB, diarrhea, constipation, bleeding, edema, or falls.        Objective     Physical Exam  Constitutional:       Appearance: Normal appearance.   HENT:      Head: Normocephalic.      Right Ear: External ear normal.      Left Ear: External ear normal.      Nose: Nose normal.   Eyes:      General: Scleral icterus present.   Cardiovascular:      Rate and Rhythm: Normal rate and regular rhythm.   Pulmonary:      Effort: Pulmonary effort is normal.      Breath sounds: Normal breath sounds.   Abdominal:      General: Bowel sounds are normal.      Palpations: Abdomen is soft.   Musculoskeletal:         General: Normal range of motion.      Cervical back: Normal range of motion and neck supple.   Skin:     General: Skin is warm and dry.   Neurological:      Mental Status: He is alert and oriented to person, place, and time. Mental status is at baseline.   Psychiatric:         Mood and Affect: Mood normal.         Behavior: Behavior normal.         Thought Content: Thought content normal.         Last Recorded Vitals  Blood pressure 120/68, pulse 84, temperature 36.8 °C (98.2 °F), temperature source Temporal, resp. rate 12, height 1.88 m (6' 2\"), weight 69.4 kg (153 lb), SpO2 92%.  Intake/Output last 3 Shifts:  I/O last 3 completed shifts:  In: - (0 mL/kg)   Out: 2350 (33.9 mL/kg) [Urine:2350 (0.9 mL/kg/hr)]  Weight: 69.4 kg     Relevant Results    .Scheduled medications  enoxaparin, 40 mg, subcutaneous, q24h  folic acid, 1 mg, oral, Daily      Continuous medications     PRN medications  PRN " medications: diphenhydrAMINE, docusate sodium **OR** polyethylene glycol, HYDROmorphone, naloxone, ondansetron ODT, oxyCODONE, pseudoephedrine    .  Results for orders placed or performed during the hospital encounter of 08/23/24 (from the past 24 hour(s))   Transthoracic Echo (TTE) Complete   Result Value Ref Range    AV pk delano 1.68 m/s    LVOT diam 2.40 cm    MV E/A ratio 2.08     Tricuspid annular plane systolic excursion 3.0 cm    LV EF 63 %    RV free wall pk S' 17.40 cm/s    LVIDd 5.80 cm    Aortic Valve Area by Continuity of Peak Velocity 3.50 cm2    AV pk grad 11.3 mmHg    LV A4C EF 61.8                   Assessment/Plan   Assessment & Plan  Sickle cell anemia with pain (Multi)    Sickle cell crisis (Multi)    Joellen Narayan is a 23 y.o. 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 Urology consulted in ED and s/p reduction with 18 g needles in his corpora bilateral through the glans, irrigation with NS and injection of 400mcg phenylephrine. Venous/arterial gas sent from penile drainage, c/w ischemia. He is cleared for DC from a urology standpoint now that penis flaccid, however will be admitted for further pain control. CT PE (8/23) negative for PE, lung consolidation. CT A/P (8/23) showing moderate colonic stool burden, GB w/ several stones (has upcoming plan for lap landen with Gen surg). Started on dilaudid 2mg q2h PRN severe pain (8/23-8/25), pt reporting pain significantly improved 8/25 rotated PO/IV for pain mgmt. DC home resume O2 pending improvement in pain, poss  8/26.      # Hgb SS with acute on chronic pain, improving   - OARRS reviewed, no aberrancies  - No current care path  - Hgb baseline ~8.5, Hgb 8.3 (8/25)  - Tbili baseline fluctuates ~5-13, Tbili 8.5 (8/24)  - LDH baseline ~500,  (8/23)  - Mild leukocytosis noted on admission consistent with previous admissions. WBC 12 k (8/25); likely reactive as pt afebrile with no s/sx of infectious process  - Pt with SOB, however consistent with pt's baseline presentation of sickle cell crisis. In the absence of imaging findings c/f infection or ACS, no wheezing, and no fevers-- low c/f ACS at this time, will monitor  - CT PE (8/23) negative for PE, lung consolidation  - CT A/P (8/23) for flank pain showing constipation, no acute process   - CXR 8/23 no acute process, hs-Trop negative   - On admit s/p IV dilaudid 2mg q2hrs PRN severe pain (8/23-8/25)  - 8/25 started home oxycodone 15mg q4 prn severe pain and 2mg IVP dilaudid q3 PRN breakthrough   - c/w home folic acid 1mg daily  - Utox (8/23) +MJ  - Hgb S (8/23) 70%  - PO Zofran PRN for opioid-induced nausea, PO Benadryl PRN for opioid-induced pruritus, Bowel regimen for opioid-induced constipation with DocuSenna 2tabs BID and Miralax daily      # Priapism, improving   - Likely 2/2 SCD   - Presented to the ED with priapism >12hrs  - Urology consulted in ED and performed reduction with 18 g needles in his corpora bilateral through the glans, irrigation with NS and injection of 400mcg phenylephrine. Penile ABG gas sent from penile drainage, c/w ischemia  - He is cleared for DC from a urology standpoint  - Sudafed daily PRN priapism ordered  - Pt missed urology FUV 7/2, requested new appt      # Nodular lymphocyte predominant Hodgkins lymphoma (NLPHL)   - Primary Oncologist: Dr. Stoll  - Enlarged lymph nodes noted 4/1/22  - RUQ US (11/14/22) with mildly enlarged LNs in the region of the kavin hepatis  - MRI liver (11/16/22) showed re-demonstration of bulky retroperitoneal  lymphadenopathy and kavin hepatic lymphadenopathy    - (11/18/22) lymph node biopsy showed atypical lymphoid infiltrate. Reviewed by Hemepath board, insufficient for lymphoma diagnosis  - PET/CT (12/6/22) showing retroperitoneal lymphadenopathy  - Followed up with Dr. Stoll (12/16/22) with plan for surg/onc consult for large tissue bx but patient missed apt and was lost to follow up  - Requested new apt with Dr. Ronnie Marte 6/19/23, patient was not seen and lost to follow up  - CT A/P (11/28/23) increased size of retroperitoneal lymph nodes reflecting extramedullary hematopoiesis I/s/o sickle cell vs lymphoma  - Paraaortic LN bx via IR (11/30/23) consistent with NLPHL. Flow: no clonal B cell or T cell population identified, lymphocyte 95%, CD3+CD4+ 68%, CD3+CD8+ 7%, CD19+ 24%  - Elevated LDH/bili partially from sickle cell disease   - Chemotherapy (R-CHOP) was discussed with primary oncologist Dr. Stoll, and decision was made to simplify his chemotherapy to Rituxan and prednisone q3 weeks mainly due to frequent sickle cell crisis  - Current chemo regimen: rituxan and prednisone q3 weeks (C1 1/18/24, C2 2/8/24, Missed C3 d/t sickle cell pain crisis, C4 4/4/24, C5 5/16/24, C6 6/7/24)  - Per pt no longer taking Acyclovir 400mg BID prophy   - PET CT (7/25) overall showing great response to treatment with persistent residual viable disease involving a left common iliac node  - Last FUV with Dr. Stoll 7/25/24 rec RTC in 2 months (next FUV scheduled for 9/19)     # hx recent choledocholithiasis  - s/p ERCP 7/18/24 with a biliary sphincterotomy where sludge and stones were removed, achieving complete clearance  - Seen by gen surg 8/8/24 Dr. Dove who rec lap cholecystectomy d/t the chance of recurrence of choledocholithiasis  - Surgery has yet to be scheduled  - Tbili 8.5 (8/23) which is markedly improved, recent peak at 52.8 prior to ERCP during recent hospital admission     # Hx of nocturnal oxygen dependence and  "hypoxia on room air  - Has not had home oxygen for 2-3 years   - Wean as able, encourage incentive spirometry  - Pulm FUV 8/8- \"Given his history of sickle cell disease, it is important to ensure he has formal sleep testing to look at desaturations and presence of sleep apnea despite not having a convincing history/body habitus typical for suspecting sleep apnea- patients with sickle cell have a higher prevalence of sleep disorders including nocturnal hypoxemia\"--> rec sleep referral for formal testing if deemed appropriate, ABG and O2 destat testing, and TTE with bubble study  - Was sent with new O2 2L NC June 2024  - TTE 8/23 showing EF 60-65%, severely dilated LV and LA, + intrapulmonary shunting, no significant change to Jan 2024 ECHO      # DVT prophy: Lovenox subcutaneous, SCDs, encourage ambulation    # dispo   - Full code, confirmed on admit  - DC home resumed O2 pending improvement in pain, poss 8/26   - NOK: Melissa Narayan (Parent) [phone]  - Dr. Stoll FUV 9/19, Sickle Cell FUV requested/pending, urology FUV requested     I spent >60 minutes in the professional and overall care of this patient.    Assessment and plan discussed with attending physician Dr. Parnell.       Evelyne Matt, RAAD-CNP      "

## 2024-08-26 VITALS
SYSTOLIC BLOOD PRESSURE: 119 MMHG | HEIGHT: 74 IN | OXYGEN SATURATION: 92 % | HEART RATE: 77 BPM | WEIGHT: 153 LBS | RESPIRATION RATE: 18 BRPM | DIASTOLIC BLOOD PRESSURE: 68 MMHG | TEMPERATURE: 99.6 F | BODY MASS INDEX: 19.64 KG/M2

## 2024-08-26 LAB
ALBUMIN SERPL BCP-MCNC: 4.6 G/DL (ref 3.4–5)
ALP SERPL-CCNC: 138 U/L (ref 33–120)
ALT SERPL W P-5'-P-CCNC: 29 U/L (ref 10–52)
ANION GAP SERPL CALC-SCNC: 14 MMOL/L (ref 10–20)
AST SERPL W P-5'-P-CCNC: 72 U/L (ref 9–39)
ATRIAL RATE: 83 BPM
BASO STIPL BLD QL SMEAR: PRESENT
BASOPHILS # BLD MANUAL: 0 X10*3/UL (ref 0–0.1)
BASOPHILS NFR BLD MANUAL: 0 %
BILIRUB SERPL-MCNC: 8.4 MG/DL (ref 0–1.2)
BUN SERPL-MCNC: 6 MG/DL (ref 6–23)
CALCIUM SERPL-MCNC: 9.6 MG/DL (ref 8.6–10.6)
CHLORIDE SERPL-SCNC: 103 MMOL/L (ref 98–107)
CO2 SERPL-SCNC: 25 MMOL/L (ref 21–32)
CREAT SERPL-MCNC: 0.57 MG/DL (ref 0.5–1.3)
EGFRCR SERPLBLD CKD-EPI 2021: >90 ML/MIN/1.73M*2
EOSINOPHIL # BLD MANUAL: 0.92 X10*3/UL (ref 0–0.7)
EOSINOPHIL NFR BLD MANUAL: 8.8 %
ERYTHROCYTE [DISTWIDTH] IN BLOOD BY AUTOMATED COUNT: 22.5 % (ref 11.5–14.5)
GLUCOSE SERPL-MCNC: 72 MG/DL (ref 74–99)
HCT VFR BLD AUTO: 20.5 % (ref 41–52)
HGB BLD-MCNC: 7.5 G/DL (ref 13.5–17.5)
HGB RETIC QN: 29 PG (ref 28–38)
IMM GRANULOCYTES # BLD AUTO: 0.31 X10*3/UL (ref 0–0.7)
IMM GRANULOCYTES NFR BLD AUTO: 3 % (ref 0–0.9)
IMMATURE RETIC FRACTION: 27.1 %
LDH SERPL L TO P-CCNC: 630 U/L (ref 84–246)
LYMPHOCYTES # BLD MANUAL: 1.36 X10*3/UL (ref 1.2–4.8)
LYMPHOCYTES NFR BLD MANUAL: 13.1 %
MCH RBC QN AUTO: 30.6 PG (ref 26–34)
MCHC RBC AUTO-ENTMCNC: 36.6 G/DL (ref 32–36)
MCV RBC AUTO: 84 FL (ref 80–100)
MONOCYTES # BLD MANUAL: 0.82 X10*3/UL (ref 0.1–1)
MONOCYTES NFR BLD MANUAL: 7.9 %
NEUTS SEG # BLD MANUAL: 7.3 X10*3/UL (ref 1.2–7)
NEUTS SEG NFR BLD MANUAL: 70.2 %
NRBC BLD-RTO: 4.3 /100 WBCS (ref 0–0)
P AXIS: 54 DEGREES
P OFFSET: 182 MS
P ONSET: 130 MS
PLATELET # BLD AUTO: 241 X10*3/UL (ref 150–450)
POLYCHROMASIA BLD QL SMEAR: ABNORMAL
POTASSIUM SERPL-SCNC: 4.6 MMOL/L (ref 3.5–5.3)
PR INTERVAL: 168 MS
PROT SERPL-MCNC: 6.8 G/DL (ref 6.4–8.2)
Q ONSET: 214 MS
QRS COUNT: 14 BEATS
QRS DURATION: 108 MS
QT INTERVAL: 364 MS
QTC CALCULATION(BAZETT): 427 MS
QTC FREDERICIA: 405 MS
R AXIS: 45 DEGREES
RBC # BLD AUTO: 2.45 X10*6/UL (ref 4.5–5.9)
RBC MORPH BLD: ABNORMAL
RETICS #: 0.67 X10*6/UL (ref 0.02–0.12)
RETICS/RBC NFR AUTO: 27.1 % (ref 0.5–2)
SICKLE CELLS BLD QL SMEAR: ABNORMAL
SODIUM SERPL-SCNC: 137 MMOL/L (ref 136–145)
T AXIS: -5 DEGREES
T OFFSET: 396 MS
TARGETS BLD QL SMEAR: ABNORMAL
TOTAL CELLS COUNTED BLD: 114
VENTRICULAR RATE: 83 BPM
WBC # BLD AUTO: 10.4 X10*3/UL (ref 4.4–11.3)

## 2024-08-26 PROCEDURE — 99239 HOSP IP/OBS DSCHRG MGMT >30: CPT

## 2024-08-26 PROCEDURE — 80053 COMPREHEN METABOLIC PANEL: CPT

## 2024-08-26 PROCEDURE — 36415 COLL VENOUS BLD VENIPUNCTURE: CPT

## 2024-08-26 PROCEDURE — 2500000001 HC RX 250 WO HCPCS SELF ADMINISTERED DRUGS (ALT 637 FOR MEDICARE OP)

## 2024-08-26 PROCEDURE — 83615 LACTATE (LD) (LDH) ENZYME: CPT

## 2024-08-26 PROCEDURE — 85007 BL SMEAR W/DIFF WBC COUNT: CPT

## 2024-08-26 PROCEDURE — 2500000004 HC RX 250 GENERAL PHARMACY W/ HCPCS (ALT 636 FOR OP/ED)

## 2024-08-26 PROCEDURE — 85027 COMPLETE CBC AUTOMATED: CPT

## 2024-08-26 PROCEDURE — 85045 AUTOMATED RETICULOCYTE COUNT: CPT

## 2024-08-26 RX ORDER — FOLIC ACID 1 MG/1
1 TABLET ORAL DAILY
Qty: 28 TABLET | Refills: 0 | Status: ON HOLD | OUTPATIENT
Start: 2024-08-26 | End: 2024-09-23

## 2024-08-26 ASSESSMENT — COGNITIVE AND FUNCTIONAL STATUS - GENERAL
MOBILITY SCORE: 24
DAILY ACTIVITIY SCORE: 24

## 2024-08-26 ASSESSMENT — PAIN - FUNCTIONAL ASSESSMENT
PAIN_FUNCTIONAL_ASSESSMENT: 0-10
PAIN_FUNCTIONAL_ASSESSMENT: 0-10

## 2024-08-26 ASSESSMENT — PAIN SCALES - GENERAL
PAINLEVEL_OUTOF10: 7
PAINLEVEL_OUTOF10: 8
PAINLEVEL_OUTOF10: 9

## 2024-08-26 ASSESSMENT — PAIN DESCRIPTION - LOCATION: LOCATION: GROIN

## 2024-08-26 NOTE — DISCHARGE SUMMARY
Discharge Diagnosis  Sickle cell anemia with pain (Multi)    Issues Requiring Follow-Up  Sickle cell Disease  Nodular lymphocyte predominant Hodgkins lymphoma    Test Results Pending At Discharge  Pending Labs       Order Current Status    Confirmation Opiate/Opioid/Benzo Prescription Compliance In process    OOB Internal Tracking In process    Opiate/Opioid/Benzo Prescription Compliance In process    THC (Marijuana), Urine, Confirmation In process          Hospital Course  Joellen Narayan is a 23 y.o. 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 Urology consulted in ED and s/p reduction with 18 g needles in his corpora bilateral through the glans, irrigation with NS and injection of 400mcg phenylephrine. Venous/arterial gas sent from penile drainage, c/w ischemia. He is cleared for DC from a urology standpoint now that penis flaccid, however will be admitted for further pain control. CT PE (8/23) negative for PE, lung consolidation. CT A/P (8/23) showing moderate colonic stool burden, GB w/ several stones (has upcoming plan for lap landen with Gen surg). Started on IV Dilaudid 2mg q2h PRN severe pain (8/23-8/25), pt reporting pain significantly improved 8/25 rotated PO/IV for pain mgmt, and pt reporting improvement of pain on 8/26 and ready for discharge.    On day of discharge, pt condition and vitals stable. Pt has FUV scheduled on 9/19 with Heme Onc (Dr. Stoll) and 10/7 with Urology. FUV with sickle cell team has been requested; discussed with pt that he will be notified of  date/time when he is scheduled. Pt discharged home with resumed home O2.     Pertinent Physical Exam At Time of Discharge  Physical Exam  Vitals reviewed.   Constitutional:       Appearance: Normal appearance.   HENT:      Head: Normocephalic and atraumatic.      Nose: Nose normal.      Mouth/Throat:      Mouth: Mucous membranes are moist.      Pharynx: Oropharynx is clear.   Eyes:      Extraocular Movements: Extraocular movements intact.      Pupils: Pupils are equal, round, and reactive to light.   Cardiovascular:      Rate and Rhythm: Normal rate and regular rhythm.      Pulses: Normal pulses.      Heart sounds: Normal heart sounds.   Pulmonary:      Effort: Pulmonary effort is normal.      Breath sounds: Normal breath sounds.   Abdominal:      General: Bowel sounds are normal.      Palpations: Abdomen is soft.   Musculoskeletal:         General: Normal range of motion.   Skin:     General: Skin is warm.   Neurological:      General: No focal deficit present.      Mental Status: He is alert and oriented to person, place, and time. Mental status is at baseline.   Psychiatric:         Mood and Affect: Mood normal.         Behavior: Behavior normal.       Home Medications     Medication List      CONTINUE taking these medications     folic acid 1 mg tablet; Commonly known as: Folvite; Take 1 tablet (1 mg)   by mouth once daily.   oxyCODONE 15 mg immediate release tablet; Commonly known as: Roxicodone;   Take 1 tablet (15 mg) by mouth every 6 hours if needed (Severe sickle cell   pain) for up to 5 days.       Outpatient Follow-Up  Future Appointments   Date Time Provider Department Center   9/19/2024  9:20 AM Melissa Stoll MD [id number]   10/7/2024  8:30 AM Josiah Christiansen MD [facility] spent >30 minutes on discharge planning.     Discharge planning discussed with attending physician Dr. Dior.    Brenda Ocasio, APRN-CNP

## 2024-08-26 NOTE — PROGRESS NOTES
08/26/24 0900   Discharge Planning   Who is requesting discharge planning? Provider   Home or Post Acute Services None   Expected Discharge Disposition Home     Care Transition Team Note  08/26/2024  Plan per Medical/Surgical Team: sickle cell anemia pain  Status: Inpatient  Payor Source: [place]   Discharge Disposition: [facility]  Expected date of discharge: 08/26    Per the medical team, this patient has no anticipated discharge needs; full assessment deferred at this time. Will dc to home with resumed O2. Will follow. Please advise SW/TCC if discharge planning needs arise.   DANIEL Hernandez

## 2024-08-27 ENCOUNTER — TELEPHONE (OUTPATIENT)
Dept: ADMISSION | Facility: HOSPITAL | Age: 24
End: 2024-08-27
Payer: COMMERCIAL

## 2024-08-27 LAB — CARBOXYTHC UR-MCNC: >500 NG/ML

## 2024-08-27 NOTE — TELEPHONE ENCOUNTER
Pt called requesting a return to work letter be faxed to his employer, [employer]. He would like to return tomorrow, 8/29 with no restrictions. Fax number is [phone]. Message sent to the team.

## 2024-08-28 ENCOUNTER — PATIENT OUTREACH (OUTPATIENT)
Dept: CARE COORDINATION | Facility: CLINIC | Age: 24
End: 2024-08-28
Payer: COMMERCIAL

## 2024-08-30 ENCOUNTER — HOSPITAL ENCOUNTER (OUTPATIENT)
Facility: HOSPITAL | Age: 24
Setting detail: OBSERVATION
LOS: 1 days | Discharge: HOME | DRG: 812 | End: 2024-08-31
Attending: EMERGENCY MEDICINE | Admitting: INTERNAL MEDICINE
Payer: COMMERCIAL

## 2024-08-30 DIAGNOSIS — N48.32 PRIAPISM DUE TO SICKLE CELL DISEASE (MULTI): ICD-10-CM

## 2024-08-30 DIAGNOSIS — D57.00 SICKLE CELL CRISIS (MULTI): Primary | ICD-10-CM

## 2024-08-30 DIAGNOSIS — D57.09 PRIAPISM DUE TO SICKLE CELL DISEASE (MULTI): ICD-10-CM

## 2024-08-30 LAB
1OH-MIDAZOLAM UR CFM-MCNC: >1000 NG/ML
6MAM UR CFM-MCNC: <25 NG/ML
7AMINOCLONAZEPAM UR CFM-MCNC: <25 NG/ML
A-OH ALPRAZ UR CFM-MCNC: <25 NG/ML
ALPRAZ UR CFM-MCNC: <25 NG/ML
CHLORDIAZEP UR CFM-MCNC: <25 NG/ML
CLONAZEPAM UR CFM-MCNC: <25 NG/ML
CODEINE UR CFM-MCNC: <50 NG/ML
DIAZEPAM UR CFM-MCNC: <25 NG/ML
EDDP UR CFM-MCNC: <25 NG/ML
FENTANYL UR CFM-MCNC: <2.5 NG/ML
HYDROCODONE CTO UR CFM-MCNC: <25 NG/ML
HYDROMORPHONE UR CFM-MCNC: 1499 NG/ML
LORAZEPAM UR CFM-MCNC: <25 NG/ML
METHADONE UR CFM-MCNC: <25 NG/ML
MIDAZOLAM UR CFM-MCNC: 38 NG/ML
MORPHINE UR CFM-MCNC: <50 NG/ML
NORDIAZEPAM UR CFM-MCNC: <25 NG/ML
NORFENTANYL UR CFM-MCNC: <2.5 NG/ML
NORHYDROCODONE UR CFM-MCNC: <25 NG/ML
NOROXYCODONE UR CFM-MCNC: >1000 NG/ML
NORTRAMADOL UR-MCNC: <50 NG/ML
OXAZEPAM UR CFM-MCNC: <25 NG/ML
OXYCODONE UR CFM-MCNC: 2295 NG/ML
OXYMORPHONE UR CFM-MCNC: 2327 NG/ML
TEMAZEPAM UR CFM-MCNC: <25 NG/ML
TRAMADOL UR CFM-MCNC: <50 NG/ML
ZOLPIDEM UR CFM-MCNC: <25 NG/ML
ZOLPIDEM UR-MCNC: <25 NG/ML

## 2024-08-30 PROCEDURE — 99285 EMERGENCY DEPT VISIT HI MDM: CPT

## 2024-08-30 ASSESSMENT — PAIN DESCRIPTION - ORIENTATION: ORIENTATION: MID

## 2024-08-30 ASSESSMENT — PAIN DESCRIPTION - ONSET: ONSET: ONGOING

## 2024-08-30 ASSESSMENT — PAIN DESCRIPTION - LOCATION: LOCATION: GROIN

## 2024-08-30 ASSESSMENT — PAIN DESCRIPTION - FREQUENCY: FREQUENCY: CONSTANT/CONTINUOUS

## 2024-08-30 ASSESSMENT — PAIN DESCRIPTION - PAIN TYPE: TYPE: ACUTE PAIN

## 2024-08-30 ASSESSMENT — PAIN SCALES - GENERAL: PAINLEVEL_OUTOF10: 10 - WORST POSSIBLE PAIN

## 2024-08-30 ASSESSMENT — PAIN DESCRIPTION - PROGRESSION: CLINICAL_PROGRESSION: GRADUALLY WORSENING

## 2024-08-30 ASSESSMENT — PAIN DESCRIPTION - DESCRIPTORS: DESCRIPTORS: ACHING;DISCOMFORT

## 2024-08-30 ASSESSMENT — PAIN - FUNCTIONAL ASSESSMENT: PAIN_FUNCTIONAL_ASSESSMENT: 0-10

## 2024-08-31 ENCOUNTER — HOSPITAL ENCOUNTER (INPATIENT)
Facility: HOSPITAL | Age: 24
End: 2024-08-31
Admitting: INTERNAL MEDICINE
Payer: COMMERCIAL

## 2024-08-31 ENCOUNTER — ANESTHESIA EVENT (OUTPATIENT)
Dept: OPERATING ROOM | Facility: HOSPITAL | Age: 24
End: 2024-08-31
Payer: COMMERCIAL

## 2024-08-31 ENCOUNTER — ANESTHESIA (OUTPATIENT)
Dept: OPERATING ROOM | Facility: HOSPITAL | Age: 24
End: 2024-08-31
Payer: COMMERCIAL

## 2024-08-31 VITALS
SYSTOLIC BLOOD PRESSURE: 118 MMHG | HEART RATE: 68 BPM | TEMPERATURE: 97.3 F | RESPIRATION RATE: 12 BRPM | OXYGEN SATURATION: 96 % | BODY MASS INDEX: 19.89 KG/M2 | DIASTOLIC BLOOD PRESSURE: 63 MMHG | WEIGHT: 155 LBS | HEIGHT: 74 IN

## 2024-08-31 DIAGNOSIS — N48.32 PRIAPISM DUE TO SICKLE CELL DISEASE (MULTI): Primary | ICD-10-CM

## 2024-08-31 DIAGNOSIS — D57.00 SICKLE-CELL DISEASE WITH PAIN (MULTI): ICD-10-CM

## 2024-08-31 DIAGNOSIS — D57.00 SICKLE CELL ANEMIA WITH PAIN (MULTI): ICD-10-CM

## 2024-08-31 DIAGNOSIS — K80.50 CHOLEDOCHOLITHIASIS: ICD-10-CM

## 2024-08-31 DIAGNOSIS — D57.00 SICKLE CELL CRISIS (MULTI): ICD-10-CM

## 2024-08-31 DIAGNOSIS — D57.09 PRIAPISM DUE TO SICKLE CELL DISEASE (MULTI): Primary | ICD-10-CM

## 2024-08-31 LAB
ALBUMIN SERPL BCP-MCNC: 4 G/DL (ref 3.4–5)
ALBUMIN SERPL BCP-MCNC: 4.3 G/DL (ref 3.4–5)
ALP SERPL-CCNC: 121 U/L (ref 33–120)
ALP SERPL-CCNC: 138 U/L (ref 33–120)
ALT SERPL W P-5'-P-CCNC: 29 U/L (ref 10–52)
ALT SERPL W P-5'-P-CCNC: 35 U/L (ref 10–52)
ANION GAP SERPL CALC-SCNC: 12 MMOL/L (ref 10–20)
ANION GAP SERPL CALC-SCNC: 13 MMOL/L (ref 10–20)
AST SERPL W P-5'-P-CCNC: 43 U/L (ref 9–39)
AST SERPL W P-5'-P-CCNC: 55 U/L (ref 9–39)
BASO STIPL BLD QL SMEAR: PRESENT
BASOPHILS # BLD AUTO: 0.02 X10*3/UL (ref 0–0.1)
BASOPHILS # BLD MANUAL: 0.1 X10*3/UL (ref 0–0.1)
BASOPHILS NFR BLD AUTO: 0.2 %
BASOPHILS NFR BLD MANUAL: 0.9 %
BILIRUB SERPL-MCNC: 6 MG/DL (ref 0–1.2)
BILIRUB SERPL-MCNC: 7.2 MG/DL (ref 0–1.2)
BUN SERPL-MCNC: 7 MG/DL (ref 6–23)
BUN SERPL-MCNC: 7 MG/DL (ref 6–23)
CALCIUM SERPL-MCNC: 8.8 MG/DL (ref 8.6–10.6)
CALCIUM SERPL-MCNC: 9.5 MG/DL (ref 8.6–10.6)
CHLORIDE SERPL-SCNC: 106 MMOL/L (ref 98–107)
CHLORIDE SERPL-SCNC: 107 MMOL/L (ref 98–107)
CO2 SERPL-SCNC: 23 MMOL/L (ref 21–32)
CO2 SERPL-SCNC: 25 MMOL/L (ref 21–32)
CREAT SERPL-MCNC: 0.67 MG/DL (ref 0.5–1.3)
CREAT SERPL-MCNC: 0.7 MG/DL (ref 0.5–1.3)
EGFRCR SERPLBLD CKD-EPI 2021: >90 ML/MIN/1.73M*2
EGFRCR SERPLBLD CKD-EPI 2021: >90 ML/MIN/1.73M*2
EOSINOPHIL # BLD AUTO: 0.17 X10*3/UL (ref 0–0.7)
EOSINOPHIL # BLD MANUAL: 0.19 X10*3/UL (ref 0–0.7)
EOSINOPHIL NFR BLD AUTO: 1.9 %
EOSINOPHIL NFR BLD MANUAL: 1.7 %
ERYTHROCYTE [DISTWIDTH] IN BLOOD BY AUTOMATED COUNT: 21 % (ref 11.5–14.5)
ERYTHROCYTE [DISTWIDTH] IN BLOOD BY AUTOMATED COUNT: 22.5 % (ref 11.5–14.5)
GLUCOSE SERPL-MCNC: 84 MG/DL (ref 74–99)
GLUCOSE SERPL-MCNC: 93 MG/DL (ref 74–99)
HCT VFR BLD AUTO: 18.6 % (ref 41–52)
HCT VFR BLD AUTO: 21.8 % (ref 41–52)
HGB BLD-MCNC: 6.9 G/DL (ref 13.5–17.5)
HGB BLD-MCNC: 8.3 G/DL (ref 13.5–17.5)
HGB RETIC QN: 30 PG (ref 28–38)
HGB RETIC QN: 32 PG (ref 28–38)
HOWELL-JOLLY BOD BLD QL SMEAR: PRESENT
IMM GRANULOCYTES # BLD AUTO: 0.14 X10*3/UL (ref 0–0.7)
IMM GRANULOCYTES # BLD AUTO: 0.18 X10*3/UL (ref 0–0.7)
IMM GRANULOCYTES NFR BLD AUTO: 1.6 % (ref 0–0.9)
IMM GRANULOCYTES NFR BLD AUTO: 1.6 % (ref 0–0.9)
IMMATURE RETIC FRACTION: 19.8 %
IMMATURE RETIC FRACTION: 28.7 %
LDH SERPL L TO P-CCNC: 448 U/L (ref 84–246)
LDH SERPL L TO P-CCNC: 484 U/L (ref 84–246)
LYMPHOCYTES # BLD AUTO: 1.66 X10*3/UL (ref 1.2–4.8)
LYMPHOCYTES # BLD MANUAL: 3.38 X10*3/UL (ref 1.2–4.8)
LYMPHOCYTES NFR BLD AUTO: 18.9 %
LYMPHOCYTES NFR BLD MANUAL: 30.2 %
MCH RBC QN AUTO: 30.1 PG (ref 26–34)
MCH RBC QN AUTO: 31 PG (ref 26–34)
MCHC RBC AUTO-ENTMCNC: 37.1 G/DL (ref 32–36)
MCHC RBC AUTO-ENTMCNC: 38.1 G/DL (ref 32–36)
MCV RBC AUTO: 81 FL (ref 80–100)
MCV RBC AUTO: 81 FL (ref 80–100)
MONOCYTES # BLD AUTO: 1.08 X10*3/UL (ref 0.1–1)
MONOCYTES # BLD MANUAL: 0.19 X10*3/UL (ref 0.1–1)
MONOCYTES NFR BLD AUTO: 12.3 %
MONOCYTES NFR BLD MANUAL: 1.7 %
MYELOCYTES # BLD MANUAL: 0.19 X10*3/UL
MYELOCYTES NFR BLD MANUAL: 1.7 %
NEUTROPHILS # BLD AUTO: 5.71 X10*3/UL (ref 1.2–7.7)
NEUTROPHILS NFR BLD AUTO: 65.1 %
NEUTS SEG # BLD MANUAL: 7.04 X10*3/UL (ref 1.2–7)
NEUTS SEG NFR BLD MANUAL: 62.9 %
NRBC BLD-RTO: 2.8 /100 WBCS (ref 0–0)
NRBC BLD-RTO: 3.1 /100 WBCS (ref 0–0)
PAPPENHEIMER BOD BLD QL SMEAR: PRESENT
PLATELET # BLD AUTO: 356 X10*3/UL (ref 150–450)
PLATELET # BLD AUTO: 429 X10*3/UL (ref 150–450)
POLYCHROMASIA BLD QL SMEAR: ABNORMAL
POLYCHROMASIA BLD QL SMEAR: NORMAL
POTASSIUM SERPL-SCNC: 3.8 MMOL/L (ref 3.5–5.3)
POTASSIUM SERPL-SCNC: 3.9 MMOL/L (ref 3.5–5.3)
PROT SERPL-MCNC: 5.9 G/DL (ref 6.4–8.2)
PROT SERPL-MCNC: 6.7 G/DL (ref 6.4–8.2)
RBC # BLD AUTO: 2.29 X10*6/UL (ref 4.5–5.9)
RBC # BLD AUTO: 2.68 X10*6/UL (ref 4.5–5.9)
RBC MORPH BLD: ABNORMAL
RBC MORPH BLD: NORMAL
RETICS #: 0.66 X10*6/UL (ref 0.02–0.12)
RETICS #: 0.75 X10*6/UL (ref 0.02–0.12)
RETICS/RBC NFR AUTO: 28.3 % (ref 0.5–2)
RETICS/RBC NFR AUTO: 28.8 % (ref 0.5–2)
SCHISTOCYTES BLD QL SMEAR: ABNORMAL
SCHISTOCYTES BLD QL SMEAR: NORMAL
SICKLE CELLS BLD QL SMEAR: ABNORMAL
SICKLE CELLS BLD QL SMEAR: NORMAL
SODIUM SERPL-SCNC: 139 MMOL/L (ref 136–145)
SODIUM SERPL-SCNC: 139 MMOL/L (ref 136–145)
TARGETS BLD QL SMEAR: ABNORMAL
TARGETS BLD QL SMEAR: NORMAL
TOTAL CELLS COUNTED BLD: 116
VARIANT LYMPHS # BLD MANUAL: 0.1 X10*3/UL (ref 0–0.5)
VARIANT LYMPHS NFR BLD: 0.9 %
WBC # BLD AUTO: 11.2 X10*3/UL (ref 4.4–11.3)
WBC # BLD AUTO: 8.8 X10*3/UL (ref 4.4–11.3)

## 2024-08-31 PROCEDURE — 2500000005 HC RX 250 GENERAL PHARMACY W/O HCPCS: Performed by: UROLOGY

## 2024-08-31 PROCEDURE — 3600000007 HC OR TIME - EACH INCREMENTAL 1 MINUTE - PROCEDURE LEVEL TWO: Performed by: UROLOGY

## 2024-08-31 PROCEDURE — 2500000004 HC RX 250 GENERAL PHARMACY W/ HCPCS (ALT 636 FOR OP/ED): Performed by: EMERGENCY MEDICINE

## 2024-08-31 PROCEDURE — 2500000004 HC RX 250 GENERAL PHARMACY W/ HCPCS (ALT 636 FOR OP/ED)

## 2024-08-31 PROCEDURE — 2500000005 HC RX 250 GENERAL PHARMACY W/O HCPCS

## 2024-08-31 PROCEDURE — 80053 COMPREHEN METABOLIC PANEL: CPT

## 2024-08-31 PROCEDURE — 85007 BL SMEAR W/DIFF WBC COUNT: CPT | Performed by: EMERGENCY MEDICINE

## 2024-08-31 PROCEDURE — 7100000002 HC RECOVERY ROOM TIME - EACH INCREMENTAL 1 MINUTE: Performed by: UROLOGY

## 2024-08-31 PROCEDURE — 99223 1ST HOSP IP/OBS HIGH 75: CPT

## 2024-08-31 PROCEDURE — 2720000007 HC OR 272 NO HCPCS: Performed by: UROLOGY

## 2024-08-31 PROCEDURE — 54220 IRRG CRPRA CAVRNOSA PRIAPISM: CPT | Performed by: UROLOGY

## 2024-08-31 PROCEDURE — 1170000001 HC PRIVATE ONCOLOGY ROOM DAILY

## 2024-08-31 PROCEDURE — 85027 COMPLETE CBC AUTOMATED: CPT | Performed by: EMERGENCY MEDICINE

## 2024-08-31 PROCEDURE — 83020 HEMOGLOBIN ELECTROPHORESIS: CPT | Performed by: PATHOLOGY

## 2024-08-31 PROCEDURE — 54235 NJX CORPORA CAVERNOSA RX AGT: CPT | Performed by: UROLOGY

## 2024-08-31 PROCEDURE — 85045 AUTOMATED RETICULOCYTE COUNT: CPT | Performed by: EMERGENCY MEDICINE

## 2024-08-31 PROCEDURE — 7100000001 HC RECOVERY ROOM TIME - INITIAL BASE CHARGE: Performed by: UROLOGY

## 2024-08-31 PROCEDURE — 85025 COMPLETE CBC W/AUTO DIFF WBC: CPT

## 2024-08-31 PROCEDURE — 83615 LACTATE (LD) (LDH) ENZYME: CPT

## 2024-08-31 PROCEDURE — 80053 COMPREHEN METABOLIC PANEL: CPT | Performed by: EMERGENCY MEDICINE

## 2024-08-31 PROCEDURE — 83615 LACTATE (LD) (LDH) ENZYME: CPT | Performed by: EMERGENCY MEDICINE

## 2024-08-31 PROCEDURE — 3700000001 HC GENERAL ANESTHESIA TIME - INITIAL BASE CHARGE: Performed by: UROLOGY

## 2024-08-31 PROCEDURE — 36415 COLL VENOUS BLD VENIPUNCTURE: CPT | Performed by: EMERGENCY MEDICINE

## 2024-08-31 PROCEDURE — 7100000010 HC PHASE TWO TIME - EACH INCREMENTAL 1 MINUTE: Performed by: UROLOGY

## 2024-08-31 PROCEDURE — 36415 COLL VENOUS BLD VENIPUNCTURE: CPT | Performed by: ANESTHESIOLOGY

## 2024-08-31 PROCEDURE — 85045 AUTOMATED RETICULOCYTE COUNT: CPT

## 2024-08-31 PROCEDURE — 2500000001 HC RX 250 WO HCPCS SELF ADMINISTERED DRUGS (ALT 637 FOR MEDICARE OP)

## 2024-08-31 PROCEDURE — 3700000002 HC GENERAL ANESTHESIA TIME - EACH INCREMENTAL 1 MINUTE: Performed by: UROLOGY

## 2024-08-31 PROCEDURE — 3600000002 HC OR TIME - INITIAL BASE CHARGE - PROCEDURE LEVEL TWO: Performed by: UROLOGY

## 2024-08-31 PROCEDURE — 7100000009 HC PHASE TWO TIME - INITIAL BASE CHARGE: Performed by: UROLOGY

## 2024-08-31 PROCEDURE — 3E1N38Z IRRIGATION OF MALE REPRODUCTIVE USING IRRIGATING SUBSTANCE, PERCUTANEOUS APPROACH: ICD-10-PCS | Performed by: STUDENT IN AN ORGANIZED HEALTH CARE EDUCATION/TRAINING PROGRAM

## 2024-08-31 PROCEDURE — 83021 HEMOGLOBIN CHROMOTOGRAPHY: CPT

## 2024-08-31 PROCEDURE — 99222 1ST HOSP IP/OBS MODERATE 55: CPT | Performed by: UROLOGY

## 2024-08-31 RX ORDER — SODIUM CHLORIDE 0.9 G/100ML
IRRIGANT IRRIGATION AS NEEDED
Status: DISCONTINUED | OUTPATIENT
Start: 2024-08-31 | End: 2024-08-31 | Stop reason: HOSPADM

## 2024-08-31 RX ORDER — MIDAZOLAM HYDROCHLORIDE 1 MG/ML
2 INJECTION INTRAMUSCULAR; INTRAVENOUS ONCE
Status: COMPLETED | OUTPATIENT
Start: 2024-08-31 | End: 2024-08-31

## 2024-08-31 RX ORDER — PHENYLEPHRINE HCL IN 0.9% NACL 0.4MG/10ML
SYRINGE (ML) INTRAVENOUS
Status: DISCONTINUED
Start: 2024-08-31 | End: 2024-08-31 | Stop reason: WASHOUT

## 2024-08-31 RX ORDER — HYDROMORPHONE HYDROCHLORIDE 1 MG/ML
0.5 INJECTION, SOLUTION INTRAMUSCULAR; INTRAVENOUS; SUBCUTANEOUS EVERY 5 MIN PRN
Status: DISCONTINUED | OUTPATIENT
Start: 2024-08-31 | End: 2024-08-31 | Stop reason: HOSPADM

## 2024-08-31 RX ORDER — ENOXAPARIN SODIUM 100 MG/ML
40 INJECTION SUBCUTANEOUS EVERY 24 HOURS
Status: DISCONTINUED | OUTPATIENT
Start: 2024-08-31 | End: 2024-09-09 | Stop reason: HOSPADM

## 2024-08-31 RX ORDER — FOLIC ACID 1 MG/1
1 TABLET ORAL DAILY
Status: DISCONTINUED | OUTPATIENT
Start: 2024-08-31 | End: 2024-08-31

## 2024-08-31 RX ORDER — ACETAMINOPHEN 325 MG/1
650 TABLET ORAL EVERY 4 HOURS PRN
Status: DISCONTINUED | OUTPATIENT
Start: 2024-08-31 | End: 2024-08-31

## 2024-08-31 RX ORDER — FOLIC ACID 1 MG/1
1 TABLET ORAL DAILY
Status: DISCONTINUED | OUTPATIENT
Start: 2024-08-31 | End: 2024-09-09 | Stop reason: HOSPADM

## 2024-08-31 RX ORDER — OXYCODONE HYDROCHLORIDE 5 MG/1
5 TABLET ORAL EVERY 4 HOURS PRN
Status: DISCONTINUED | OUTPATIENT
Start: 2024-08-31 | End: 2024-08-31

## 2024-08-31 RX ORDER — CLINDAMYCIN PHOSPHATE 600 MG/50ML
INJECTION, SOLUTION INTRAVENOUS AS NEEDED
Status: DISCONTINUED | OUTPATIENT
Start: 2024-08-31 | End: 2024-08-31

## 2024-08-31 RX ORDER — ONDANSETRON HYDROCHLORIDE 2 MG/ML
4 INJECTION, SOLUTION INTRAVENOUS ONCE
Status: DISCONTINUED | OUTPATIENT
Start: 2024-08-31 | End: 2024-08-31

## 2024-08-31 RX ORDER — LIDOCAINE HYDROCHLORIDE 20 MG/ML
INJECTION, SOLUTION INFILTRATION; PERINEURAL AS NEEDED
Status: DISCONTINUED | OUTPATIENT
Start: 2024-08-31 | End: 2024-08-31

## 2024-08-31 RX ORDER — ONDANSETRON 4 MG/1
8 TABLET, ORALLY DISINTEGRATING ORAL EVERY 8 HOURS PRN
Status: DISCONTINUED | OUTPATIENT
Start: 2024-08-31 | End: 2024-08-31

## 2024-08-31 RX ORDER — HYDROMORPHONE HYDROCHLORIDE 1 MG/ML
1 INJECTION, SOLUTION INTRAMUSCULAR; INTRAVENOUS; SUBCUTANEOUS
Status: COMPLETED | OUTPATIENT
Start: 2024-08-31 | End: 2024-08-31

## 2024-08-31 RX ORDER — ENOXAPARIN SODIUM 100 MG/ML
40 INJECTION SUBCUTANEOUS EVERY 24 HOURS
Status: DISCONTINUED | OUTPATIENT
Start: 2024-08-31 | End: 2024-08-31

## 2024-08-31 RX ORDER — LIDOCAINE HYDROCHLORIDE 20 MG/ML
10 INJECTION, SOLUTION INFILTRATION; PERINEURAL ONCE
Status: COMPLETED | OUTPATIENT
Start: 2024-08-31 | End: 2024-08-31

## 2024-08-31 RX ORDER — SODIUM CHLORIDE 9 MG/ML
INJECTION, SOLUTION INTRAVENOUS CONTINUOUS PRN
Status: DISCONTINUED | OUTPATIENT
Start: 2024-08-31 | End: 2024-08-31

## 2024-08-31 RX ORDER — HYDROMORPHONE HYDROCHLORIDE 1 MG/ML
1 INJECTION, SOLUTION INTRAMUSCULAR; INTRAVENOUS; SUBCUTANEOUS ONCE
Status: COMPLETED | OUTPATIENT
Start: 2024-08-31 | End: 2024-08-31

## 2024-08-31 RX ORDER — KETAMINE HYDROCHLORIDE 100 MG/ML
100 INJECTION, SOLUTION INTRAMUSCULAR; INTRAVENOUS ONCE
Status: DISCONTINUED | OUTPATIENT
Start: 2024-08-31 | End: 2024-08-31 | Stop reason: WASHOUT

## 2024-08-31 RX ORDER — ONDANSETRON 8 MG/1
8 TABLET, ORALLY DISINTEGRATING ORAL EVERY 8 HOURS PRN
Status: DISCONTINUED | OUTPATIENT
Start: 2024-08-31 | End: 2024-09-09 | Stop reason: HOSPADM

## 2024-08-31 RX ORDER — HYDRALAZINE HYDROCHLORIDE 20 MG/ML
5 INJECTION INTRAMUSCULAR; INTRAVENOUS EVERY 30 MIN PRN
Status: DISCONTINUED | OUTPATIENT
Start: 2024-08-31 | End: 2024-08-31 | Stop reason: HOSPADM

## 2024-08-31 RX ORDER — PHENYLEPHRINE HCL IN 0.9% NACL 1 MG/10 ML
100 SYRINGE (ML) INTRAVENOUS ONCE
Status: DISCONTINUED | OUTPATIENT
Start: 2024-08-31 | End: 2024-08-31 | Stop reason: WASHOUT

## 2024-08-31 RX ORDER — DIPHENHYDRAMINE HCL 25 MG
25 CAPSULE ORAL EVERY 6 HOURS PRN
Status: DISCONTINUED | OUTPATIENT
Start: 2024-08-31 | End: 2024-08-31

## 2024-08-31 RX ORDER — OXYCODONE HYDROCHLORIDE 5 MG/1
20 TABLET ORAL EVERY 4 HOURS PRN
Status: DISCONTINUED | OUTPATIENT
Start: 2024-08-31 | End: 2024-08-31

## 2024-08-31 RX ORDER — DIPHENHYDRAMINE HYDROCHLORIDE 50 MG/ML
25 INJECTION INTRAMUSCULAR; INTRAVENOUS ONCE
Status: COMPLETED | OUTPATIENT
Start: 2024-08-31 | End: 2024-08-31

## 2024-08-31 RX ORDER — PROPOFOL 10 MG/ML
INJECTION, EMULSION INTRAVENOUS AS NEEDED
Status: DISCONTINUED | OUTPATIENT
Start: 2024-08-31 | End: 2024-08-31

## 2024-08-31 RX ORDER — DOCUSATE SODIUM 100 MG/1
100 CAPSULE, LIQUID FILLED ORAL 2 TIMES DAILY PRN
Status: DISCONTINUED | OUTPATIENT
Start: 2024-08-31 | End: 2024-08-31

## 2024-08-31 RX ORDER — ALBUTEROL SULFATE 0.83 MG/ML
2.5 SOLUTION RESPIRATORY (INHALATION) ONCE AS NEEDED
Status: DISCONTINUED | OUTPATIENT
Start: 2024-08-31 | End: 2024-08-31

## 2024-08-31 RX ORDER — DIPHENHYDRAMINE HCL 25 MG
25 CAPSULE ORAL EVERY 6 HOURS PRN
Status: DISCONTINUED | OUTPATIENT
Start: 2024-08-31 | End: 2024-09-09 | Stop reason: HOSPADM

## 2024-08-31 RX ORDER — HYDROMORPHONE HYDROCHLORIDE 1 MG/ML
0.2 INJECTION, SOLUTION INTRAMUSCULAR; INTRAVENOUS; SUBCUTANEOUS EVERY 5 MIN PRN
Status: DISCONTINUED | OUTPATIENT
Start: 2024-08-31 | End: 2024-08-31 | Stop reason: HOSPADM

## 2024-08-31 RX ORDER — PSEUDOEPHEDRINE HCL 30 MG
30 TABLET ORAL EVERY 4 HOURS PRN
Status: DISCONTINUED | OUTPATIENT
Start: 2024-08-31 | End: 2024-09-01

## 2024-08-31 RX ORDER — MIDAZOLAM HYDROCHLORIDE 1 MG/ML
INJECTION INTRAMUSCULAR; INTRAVENOUS AS NEEDED
Status: DISCONTINUED | OUTPATIENT
Start: 2024-08-31 | End: 2024-08-31

## 2024-08-31 RX ORDER — OXYCODONE HYDROCHLORIDE 5 MG/1
10 TABLET ORAL EVERY 2 HOUR PRN
Status: DISCONTINUED | OUTPATIENT
Start: 2024-08-31 | End: 2024-08-31 | Stop reason: HOSPADM

## 2024-08-31 RX ORDER — MIDAZOLAM HYDROCHLORIDE 1 MG/ML
1 INJECTION INTRAMUSCULAR; INTRAVENOUS ONCE AS NEEDED
Status: DISCONTINUED | OUTPATIENT
Start: 2024-08-31 | End: 2024-08-31 | Stop reason: HOSPADM

## 2024-08-31 RX ORDER — SODIUM CHLORIDE, SODIUM LACTATE, POTASSIUM CHLORIDE, CALCIUM CHLORIDE 600; 310; 30; 20 MG/100ML; MG/100ML; MG/100ML; MG/100ML
100 INJECTION, SOLUTION INTRAVENOUS CONTINUOUS
Status: DISCONTINUED | OUTPATIENT
Start: 2024-08-31 | End: 2024-08-31

## 2024-08-31 RX ORDER — NALOXONE HYDROCHLORIDE 0.4 MG/ML
0.2 INJECTION, SOLUTION INTRAMUSCULAR; INTRAVENOUS; SUBCUTANEOUS EVERY 5 MIN PRN
Status: DISCONTINUED | OUTPATIENT
Start: 2024-08-31 | End: 2024-09-09 | Stop reason: HOSPADM

## 2024-08-31 RX ORDER — HYDROMORPHONE HYDROCHLORIDE 1 MG/ML
2 INJECTION, SOLUTION INTRAMUSCULAR; INTRAVENOUS; SUBCUTANEOUS EVERY 2 HOUR PRN
Status: DISCONTINUED | OUTPATIENT
Start: 2024-08-31 | End: 2024-09-01

## 2024-08-31 RX ORDER — POLYETHYLENE GLYCOL 3350 17 G/17G
17 POWDER, FOR SOLUTION ORAL 2 TIMES DAILY PRN
Status: DISCONTINUED | OUTPATIENT
Start: 2024-08-31 | End: 2024-09-09 | Stop reason: HOSPADM

## 2024-08-31 RX ORDER — KETOROLAC TROMETHAMINE 15 MG/ML
15 INJECTION, SOLUTION INTRAMUSCULAR; INTRAVENOUS ONCE
Status: COMPLETED | OUTPATIENT
Start: 2024-08-31 | End: 2024-08-31

## 2024-08-31 RX ORDER — METOCLOPRAMIDE HYDROCHLORIDE 5 MG/ML
10 INJECTION INTRAMUSCULAR; INTRAVENOUS ONCE AS NEEDED
Status: DISCONTINUED | OUTPATIENT
Start: 2024-08-31 | End: 2024-08-31 | Stop reason: HOSPADM

## 2024-08-31 RX ORDER — LIDOCAINE HYDROCHLORIDE AND EPINEPHRINE 10; 10 MG/ML; UG/ML
INJECTION, SOLUTION INFILTRATION; PERINEURAL AS NEEDED
Status: DISCONTINUED | OUTPATIENT
Start: 2024-08-31 | End: 2024-08-31 | Stop reason: HOSPADM

## 2024-08-31 RX ORDER — HYDROMORPHONE HYDROCHLORIDE 1 MG/ML
INJECTION, SOLUTION INTRAMUSCULAR; INTRAVENOUS; SUBCUTANEOUS AS NEEDED
Status: DISCONTINUED | OUTPATIENT
Start: 2024-08-31 | End: 2024-08-31

## 2024-08-31 RX ORDER — POLYETHYLENE GLYCOL 3350 17 G/17G
17 POWDER, FOR SOLUTION ORAL 2 TIMES DAILY PRN
Status: DISCONTINUED | OUTPATIENT
Start: 2024-08-31 | End: 2024-08-31

## 2024-08-31 RX ORDER — MORPHINE SULFATE 4 MG/ML
4 INJECTION INTRAVENOUS ONCE
Status: DISCONTINUED | OUTPATIENT
Start: 2024-08-31 | End: 2024-08-31

## 2024-08-31 RX ORDER — NALOXONE HYDROCHLORIDE 0.4 MG/ML
0.2 INJECTION, SOLUTION INTRAMUSCULAR; INTRAVENOUS; SUBCUTANEOUS EVERY 5 MIN PRN
Status: DISCONTINUED | OUTPATIENT
Start: 2024-08-31 | End: 2024-08-31

## 2024-08-31 RX ORDER — KETAMINE HYDROCHLORIDE 10 MG/ML
INJECTION, SOLUTION INTRAMUSCULAR; INTRAVENOUS CODE/TRAUMA/SEDATION MEDICATION
Status: COMPLETED | OUTPATIENT
Start: 2024-08-31 | End: 2024-08-31

## 2024-08-31 RX ORDER — DOCUSATE SODIUM 100 MG/1
100 CAPSULE, LIQUID FILLED ORAL 2 TIMES DAILY PRN
Status: DISCONTINUED | OUTPATIENT
Start: 2024-08-31 | End: 2024-09-09 | Stop reason: HOSPADM

## 2024-08-31 RX ORDER — PANTOPRAZOLE SODIUM 20 MG/1
20 TABLET, DELAYED RELEASE ORAL
Status: DISCONTINUED | OUTPATIENT
Start: 2024-09-01 | End: 2024-09-09 | Stop reason: HOSPADM

## 2024-08-31 RX ORDER — ONDANSETRON HYDROCHLORIDE 2 MG/ML
INJECTION, SOLUTION INTRAVENOUS AS NEEDED
Status: DISCONTINUED | OUTPATIENT
Start: 2024-08-31 | End: 2024-08-31

## 2024-08-31 RX ORDER — FENTANYL CITRATE 50 UG/ML
INJECTION, SOLUTION INTRAMUSCULAR; INTRAVENOUS CONTINUOUS PRN
Status: DISCONTINUED | OUTPATIENT
Start: 2024-08-31 | End: 2024-08-31

## 2024-08-31 RX ORDER — DIPHENHYDRAMINE HYDROCHLORIDE 50 MG/ML
25 INJECTION INTRAMUSCULAR; INTRAVENOUS ONCE AS NEEDED
Status: DISCONTINUED | OUTPATIENT
Start: 2024-08-31 | End: 2024-08-31

## 2024-08-31 RX ADMIN — HYDROMORPHONE HYDROCHLORIDE 2 MG: 1 INJECTION, SOLUTION INTRAMUSCULAR; INTRAVENOUS; SUBCUTANEOUS at 20:58

## 2024-08-31 RX ADMIN — HYDROMORPHONE HYDROCHLORIDE 2 MG: 1 INJECTION, SOLUTION INTRAMUSCULAR; INTRAVENOUS; SUBCUTANEOUS at 22:49

## 2024-08-31 RX ADMIN — ENOXAPARIN SODIUM 40 MG: 100 INJECTION SUBCUTANEOUS at 20:58

## 2024-08-31 RX ADMIN — HYDROMORPHONE HYDROCHLORIDE 2 MG: 1 INJECTION, SOLUTION INTRAMUSCULAR; INTRAVENOUS; SUBCUTANEOUS at 18:07

## 2024-08-31 SDOH — SOCIAL STABILITY: SOCIAL INSECURITY: WERE YOU ABLE TO COMPLETE ALL THE BEHAVIORAL HEALTH SCREENINGS?: YES

## 2024-08-31 SDOH — ECONOMIC STABILITY: FOOD INSECURITY: WITHIN THE PAST 12 MONTHS, YOU WORRIED THAT YOUR FOOD WOULD RUN OUT BEFORE YOU GOT MONEY TO BUY MORE.: PATIENT DECLINED

## 2024-08-31 SDOH — SOCIAL STABILITY: SOCIAL INSECURITY: HAVE YOU HAD THOUGHTS OF HARMING ANYONE ELSE?: NO

## 2024-08-31 SDOH — ECONOMIC STABILITY: TRANSPORTATION INSECURITY
IN THE PAST 12 MONTHS, HAS THE LACK OF TRANSPORTATION KEPT YOU FROM MEDICAL APPOINTMENTS OR FROM GETTING MEDICATIONS?: PATIENT DECLINED

## 2024-08-31 SDOH — SOCIAL STABILITY: SOCIAL INSECURITY
WITHIN THE LAST YEAR, HAVE YOU BEEN KICKED, HIT, SLAPPED, OR OTHERWISE PHYSICALLY HURT BY YOUR PARTNER OR EX-PARTNER?: NO

## 2024-08-31 SDOH — SOCIAL STABILITY: SOCIAL INSECURITY: WITHIN THE LAST YEAR, HAVE YOU BEEN HUMILIATED OR EMOTIONALLY ABUSED IN OTHER WAYS BY YOUR PARTNER OR EX-PARTNER?: NO

## 2024-08-31 SDOH — SOCIAL STABILITY: SOCIAL NETWORK: HOW OFTEN DO YOU ATTENT MEETINGS OF THE CLUB OR ORGANIZATION YOU BELONG TO?: PATIENT DECLINED

## 2024-08-31 SDOH — SOCIAL STABILITY: SOCIAL INSECURITY: ARE YOU OR HAVE YOU BEEN THREATENED OR ABUSED PHYSICALLY, EMOTIONALLY, OR SEXUALLY BY ANYONE?: NO

## 2024-08-31 SDOH — HEALTH STABILITY: MENTAL HEALTH
STRESS IS WHEN SOMEONE FEELS TENSE, NERVOUS, ANXIOUS, OR CAN'T SLEEP AT NIGHT BECAUSE THEIR MIND IS TROUBLED. HOW STRESSED ARE YOU?: PATIENT DECLINED

## 2024-08-31 SDOH — SOCIAL STABILITY: SOCIAL INSECURITY: DO YOU FEEL UNSAFE GOING BACK TO THE PLACE WHERE YOU ARE LIVING?: NO

## 2024-08-31 SDOH — SOCIAL STABILITY: SOCIAL NETWORK: IN A TYPICAL WEEK, HOW MANY TIMES DO YOU TALK ON THE PHONE WITH FAMILY, FRIENDS, OR NEIGHBORS?: PATIENT DECLINED

## 2024-08-31 SDOH — SOCIAL STABILITY: SOCIAL INSECURITY
WITHIN THE LAST YEAR, HAVE TO BEEN RAPED OR FORCED TO HAVE ANY KIND OF SEXUAL ACTIVITY BY YOUR PARTNER OR EX-PARTNER?: NO

## 2024-08-31 SDOH — HEALTH STABILITY: MENTAL HEALTH: HOW MANY STANDARD DRINKS CONTAINING ALCOHOL DO YOU HAVE ON A TYPICAL DAY?: PATIENT DOES NOT DRINK

## 2024-08-31 SDOH — SOCIAL STABILITY: SOCIAL INSECURITY: HAVE YOU HAD ANY THOUGHTS OF HARMING ANYONE ELSE?: NO

## 2024-08-31 SDOH — SOCIAL STABILITY: SOCIAL INSECURITY: DOES ANYONE TRY TO KEEP YOU FROM HAVING/CONTACTING OTHER FRIENDS OR DOING THINGS OUTSIDE YOUR HOME?: NO

## 2024-08-31 SDOH — ECONOMIC STABILITY: TRANSPORTATION INSECURITY
IN THE PAST 12 MONTHS, HAS LACK OF TRANSPORTATION KEPT YOU FROM MEETINGS, WORK, OR FROM GETTING THINGS NEEDED FOR DAILY LIVING?: PATIENT DECLINED

## 2024-08-31 SDOH — ECONOMIC STABILITY: INCOME INSECURITY: IN THE PAST 12 MONTHS, HAS THE ELECTRIC, GAS, OIL, OR WATER COMPANY THREATENED TO SHUT OFF SERVICE IN YOUR HOME?: NO

## 2024-08-31 SDOH — SOCIAL STABILITY: SOCIAL NETWORK: HOW OFTEN DO YOU GET TOGETHER WITH FRIENDS OR RELATIVES?: PATIENT DECLINED

## 2024-08-31 SDOH — ECONOMIC STABILITY: HOUSING INSECURITY: AT ANY TIME IN THE PAST 12 MONTHS, WERE YOU HOMELESS OR LIVING IN A SHELTER (INCLUDING NOW)?: PATIENT DECLINED

## 2024-08-31 SDOH — HEALTH STABILITY: PHYSICAL HEALTH: ON AVERAGE, HOW MANY DAYS PER WEEK DO YOU ENGAGE IN MODERATE TO STRENUOUS EXERCISE (LIKE A BRISK WALK)?: 2 DAYS

## 2024-08-31 SDOH — HEALTH STABILITY: MENTAL HEALTH: HOW OFTEN DO YOU HAVE A DRINK CONTAINING ALCOHOL?: NEVER

## 2024-08-31 SDOH — HEALTH STABILITY: PHYSICAL HEALTH: ON AVERAGE, HOW MANY MINUTES DO YOU ENGAGE IN EXERCISE AT THIS LEVEL?: 30 MIN

## 2024-08-31 SDOH — ECONOMIC STABILITY: INCOME INSECURITY: IN THE LAST 12 MONTHS, WAS THERE A TIME WHEN YOU WERE NOT ABLE TO PAY THE MORTGAGE OR RENT ON TIME?: PATIENT DECLINED

## 2024-08-31 SDOH — SOCIAL STABILITY: SOCIAL INSECURITY: DO YOU FEEL ANYONE HAS EXPLOITED OR TAKEN ADVANTAGE OF YOU FINANCIALLY OR OF YOUR PERSONAL PROPERTY?: NO

## 2024-08-31 SDOH — ECONOMIC STABILITY: INCOME INSECURITY: HOW HARD IS IT FOR YOU TO PAY FOR THE VERY BASICS LIKE FOOD, HOUSING, MEDICAL CARE, AND HEATING?: NOT HARD AT ALL

## 2024-08-31 SDOH — SOCIAL STABILITY: SOCIAL INSECURITY: WITHIN THE LAST YEAR, HAVE YOU BEEN AFRAID OF YOUR PARTNER OR EX-PARTNER?: NO

## 2024-08-31 SDOH — HEALTH STABILITY: MENTAL HEALTH
HOW OFTEN DO YOU NEED TO HAVE SOMEONE HELP YOU WHEN YOU READ INSTRUCTIONS, PAMPHLETS, OR OTHER WRITTEN MATERIAL FROM YOUR DOCTOR OR PHARMACY?: NEVER

## 2024-08-31 SDOH — SOCIAL STABILITY: SOCIAL NETWORK: HOW OFTEN DO YOU ATTEND CHURCH OR RELIGIOUS SERVICES?: PATIENT DECLINED

## 2024-08-31 SDOH — SOCIAL STABILITY: SOCIAL INSECURITY: ABUSE: ADULT

## 2024-08-31 SDOH — HEALTH STABILITY: MENTAL HEALTH: HOW OFTEN DO YOU HAVE 6 OR MORE DRINKS ON ONE OCCASION?: NEVER

## 2024-08-31 SDOH — SOCIAL STABILITY: SOCIAL INSECURITY: ARE THERE ANY APPARENT SIGNS OF INJURIES/BEHAVIORS THAT COULD BE RELATED TO ABUSE/NEGLECT?: NO

## 2024-08-31 SDOH — SOCIAL STABILITY: SOCIAL INSECURITY: HAS ANYONE EVER THREATENED TO HURT YOUR FAMILY OR YOUR PETS?: NO

## 2024-08-31 SDOH — SOCIAL STABILITY: SOCIAL NETWORK: ARE YOU MARRIED, WIDOWED, DIVORCED, SEPARATED, NEVER MARRIED, OR LIVING WITH A PARTNER?: PATIENT DECLINED

## 2024-08-31 SDOH — ECONOMIC STABILITY: FOOD INSECURITY: WITHIN THE PAST 12 MONTHS, THE FOOD YOU BOUGHT JUST DIDN'T LAST AND YOU DIDN'T HAVE MONEY TO GET MORE.: PATIENT DECLINED

## 2024-08-31 ASSESSMENT — PAIN SCALES - WONG BAKER
WONGBAKER_NUMERICALRESPONSE: HURTS WHOLE LOT
WONGBAKER_NUMERICALRESPONSE: HURTS EVEN MORE
WONGBAKER_NUMERICALRESPONSE: HURTS WHOLE LOT
WONGBAKER_NUMERICALRESPONSE: HURTS WORST

## 2024-08-31 ASSESSMENT — COGNITIVE AND FUNCTIONAL STATUS - GENERAL
DAILY ACTIVITIY SCORE: 24
MOBILITY SCORE: 24
PATIENT BASELINE BEDBOUND: NO

## 2024-08-31 ASSESSMENT — ENCOUNTER SYMPTOMS
APPETITE CHANGE: 0
HEADACHES: 0
NUMBNESS: 0
ABDOMINAL PAIN: 0
COUGH: 0
SORE THROAT: 0
CONSTIPATION: 0
VOMITING: 0
CHILLS: 0
SHORTNESS OF BREATH: 0
DIFFICULTY URINATING: 0
LIGHT-HEADEDNESS: 0
RHINORRHEA: 0
WEAKNESS: 0
DIARRHEA: 0
FEVER: 0
DIZZINESS: 0
NAUSEA: 0

## 2024-08-31 ASSESSMENT — ACTIVITIES OF DAILY LIVING (ADL)
JUDGMENT_ADEQUATE_SAFELY_COMPLETE_DAILY_ACTIVITIES: YES
BATHING: INDEPENDENT
ADEQUATE_TO_COMPLETE_ADL: YES
DRESSING YOURSELF: INDEPENDENT
FEEDING YOURSELF: INDEPENDENT
HEARING - LEFT EAR: FUNCTIONAL
GROOMING: INDEPENDENT
TOILETING: INDEPENDENT
HEARING - RIGHT EAR: FUNCTIONAL
WALKS IN HOME: INDEPENDENT
LACK_OF_TRANSPORTATION: PATIENT DECLINED
PATIENT'S MEMORY ADEQUATE TO SAFELY COMPLETE DAILY ACTIVITIES?: YES

## 2024-08-31 ASSESSMENT — LIFESTYLE VARIABLES
HOW OFTEN DO YOU HAVE A DRINK CONTAINING ALCOHOL: NEVER
AUDIT-C TOTAL SCORE: 0
SKIP TO QUESTIONS 9-10: 1
AUDIT-C TOTAL SCORE: 0
AUDIT-C TOTAL SCORE: 0
SKIP TO QUESTIONS 9-10: 1
HOW MANY STANDARD DRINKS CONTAINING ALCOHOL DO YOU HAVE ON A TYPICAL DAY: PATIENT DOES NOT DRINK
HOW OFTEN DO YOU HAVE 6 OR MORE DRINKS ON ONE OCCASION: NEVER

## 2024-08-31 ASSESSMENT — PAIN SCALES - GENERAL
PAINLEVEL_OUTOF10: 10 - WORST POSSIBLE PAIN
PAIN_LEVEL: 2
PAINLEVEL_OUTOF10: 10 - WORST POSSIBLE PAIN
PAINLEVEL_OUTOF10: 0 - NO PAIN
PAINLEVEL_OUTOF10: 6
PAINLEVEL_OUTOF10: 10 - WORST POSSIBLE PAIN
PAINLEVEL_OUTOF10: 10 - WORST POSSIBLE PAIN
PAINLEVEL_OUTOF10: 4
PAINLEVEL_OUTOF10: 8
PAINLEVEL_OUTOF10: 0 - NO PAIN

## 2024-08-31 ASSESSMENT — PAIN - FUNCTIONAL ASSESSMENT
PAIN_FUNCTIONAL_ASSESSMENT: 0-10

## 2024-08-31 ASSESSMENT — COLUMBIA-SUICIDE SEVERITY RATING SCALE - C-SSRS
2. HAVE YOU ACTUALLY HAD ANY THOUGHTS OF KILLING YOURSELF?: NO
2. HAVE YOU ACTUALLY HAD ANY THOUGHTS OF KILLING YOURSELF?: NO
1. IN THE PAST MONTH, HAVE YOU WISHED YOU WERE DEAD OR WISHED YOU COULD GO TO SLEEP AND NOT WAKE UP?: NO
6. HAVE YOU EVER DONE ANYTHING, STARTED TO DO ANYTHING, OR PREPARED TO DO ANYTHING TO END YOUR LIFE?: NO
6. HAVE YOU EVER DONE ANYTHING, STARTED TO DO ANYTHING, OR PREPARED TO DO ANYTHING TO END YOUR LIFE?: NO
1. IN THE PAST MONTH, HAVE YOU WISHED YOU WERE DEAD OR WISHED YOU COULD GO TO SLEEP AND NOT WAKE UP?: NO

## 2024-08-31 ASSESSMENT — PAIN DESCRIPTION - LOCATION: LOCATION: GROIN

## 2024-08-31 NOTE — PERIOPERATIVE NURSING NOTE
Patient discharged via wheelchair by [facility], belongings with patient (clothes/cellphone), responsible ride home present.    1310- Patient dressed independently. Transport requested.    1300- Discharge instructions reviewed with patient/patient's mother., verbal understanding from both.     1257- Urology resident at bedside    1245- Patient's mother at bedside

## 2024-08-31 NOTE — ED PROVIDER NOTES
Emergency Department Provider Note        History of Present Illness     History provided by: Patient  Limitations to History: None  External Records Reviewed with Brief Summary: None    HPI:  Joellen Narayan is a 23 y.o. male presenting to the [facility] ED with a chief complaint of a priapism that has lasted since 10 AM on Friday, 2024. Patient was previously treated for priapism at the [facility] ED on 2024. Patient is reporting the pain to be a 10 out of 10 in the groin region. Patient is not experiencing fevers, chills, nausea, vomiting, diarrhea, chest pain, or shortness of breath.    Physical Exam   Triage vitals:  T 36.2 °C (97.2 °F)  HR 87  /87  RR 18  O2 96 % None (Room air)    Physical Exam  Vitals and nursing note reviewed. Exam conducted with a chaperone present.   Constitutional:       General: He is in acute distress.      Appearance: He is well-developed.   HENT:      Head: Normocephalic and atraumatic.   Eyes:      General: Scleral icterus present.   Cardiovascular:      Rate and Rhythm: Normal rate and regular rhythm.      Heart sounds: No murmur heard.  Pulmonary:      Effort: Pulmonary effort is normal. No respiratory distress.      Breath sounds: Normal breath sounds.   Abdominal:      Palpations: Abdomen is soft.      Tenderness: There is no abdominal tenderness.   Genitourinary:     Penis: Tenderness present.       Comments: Priapism of the penis  Musculoskeletal:         General: No swelling.      Cervical back: Neck supple.   Skin:     General: Skin is warm and dry.      Capillary Refill: Capillary refill takes less than 2 seconds.   Neurological:      Mental Status: He is alert.   Psychiatric:         Mood and Affect: Mood normal.          Medical Decision Making & ED Course   Medical Decision Makin y.o. male presenting to the [facility] ED with sickle cell crisis causing a priapism.    Patient was given 2 mg Versed for the priapism drainage. Patient kept informing urology  to stop the procedure, 2 more mg Versed was ordered for the procedure and the patient was still unable to go forward with the procedure.    100 mg Ketalar solution ordered for procedure to go forward.  After the ketamine was injected the priapism was resolved and the penis was more flaccid, urologist was bedside to acknowledge that a drainage was no longer necessary.    Patient was started on pain medication for sickle cell crisis after this.  Patient had a care plan and the care plan was followed for pain management.    I spoke with the patient regarding care management and pain management and he would like to be admitted for further pain management on the acute care floors.    Labs ordered: Differential, reticulocyte, CBC, CMP, LDH    Lab findings: Lab findings discussed in the ED course    Imaging ordered: EKG    Imaging findings: Imaging findings discussed in the ED course    Differential diagnosis: Sickle cell crisis, priapism    Treatment decisions: Versed injection, Benadryl injection, Toradol injection, ketamine injection, Dilaudid injection, lactated Ringer's    Disposition: Patient was signed out to Dr. Tsai at 7:30 AM, I messaged the admission team before signout, the patient will be admitted for pain management.    Reassessment and response to treatment: Patient was feeling slightly better pain wise after the Dilaudid but requires further pain management in the acute care setting.  The patient's priapism was resolved but there is still pain in the groin region.    ----      Differential diagnoses considered include but are not limited to: Sickle cell crisis, priapism     Social Determinants of Health which Significantly Impact Care: None identified     EKG Independent Interpretation: EKG interpreted by myself. Please see ED Course for full interpretation.    Independent Result Review and Interpretation: Relevant laboratory and radiographic results were reviewed and independently interpreted by myself.   As necessary, they are commented on in the ED Course.    Chronic conditions affecting the patient's care: As documented above in [facility]    The patient was discussed with the following consultants/services: None    Care Considerations: As documented above in [facility]    ED Course:  ED Course as of 24 0802   Sat Aug 31, 2024   0241 Patient was given 4 mg Versed and 100 mg Ketalar, the patient was prepared to undergo the priapism drainage and after the initial sedation began the patient's priapism began to resolve itself. [[place]USTIN]   0426 LDH(!): 484  LDH count elevated almost twice as much as maximum of normal limit [AUSTIN]   0426 Bilirubin, Total (!): 7.2  Bilirubin levels elevated with his sickle cell crisis [AUSTIN]   0427 BP: 140/76  Patient's vitals have remained relatively hemodynamically stable and the patient has been afebrile.  During the procedure with the ketamine and Versed the patient's heart rate did increase to approximately the 160s [AUSTIN]   0541 Manual Differential(!)  CMP returns with relatively normal values [[place]IN]   0542 Reticulocyte Count(!)  Absolute reticulocyte count immature reticulocytes and reticulocyte percent are markedly elevated [AUSTIN]   0543 CBC with Differential(!)  CBC values are relatively baseline compared to previous labs for previous sickle cell crises for this patient. [AUSTIN]      ED Course User Index  [[place]IN] Elias Ayala DO     Disposition   Patient was signed out to Dr. Tsai at 7:30 AM pending completion of their work-up.  Please see the next provider's transition of care note for the remainder of the patient's care.     Procedures   Procedures    Patient seen and discussed with ED attending physician.    Elias Ayala DO  Emergency Medicine     Elias Ayala DO  Resident  [phone]

## 2024-08-31 NOTE — ANESTHESIA PROCEDURE NOTES
Airway  Date/Time: 8/31/2024 11:13 AM  Urgency: elective    Airway not difficult    Staffing  Performed: resident   Authorized by: Anita Melendez MD    Performed by: Beba Polanco DO  Patient location during procedure: OR    Indications and Patient Condition  Indications for airway management: anesthesia  Spontaneous Ventilation: absent  Sedation level: deep  Preoxygenated: yes  Patient position: sniffing  MILS maintained throughout  Mask difficulty assessment: 0 - not attempted  Planned trial extubation    Final Airway Details  Final airway type: supraglottic airway      Successful airway: Supreme  Size 4     Number of attempts at approach: 1

## 2024-08-31 NOTE — ED TRIAGE NOTES
Pt [place] to [facility] by way of EMS from home. Pt called EMS with concerns (SCC) in extreme pain within groin region. Pt has PMH Priapism related to SCC. Pt denies any alcohol or drug use prior to admission. PT denies any SOB, Chest pains, N/V. Pt states his pain started yesterday, unknown time. Pt rates pain as a 10/10, Aox4.

## 2024-08-31 NOTE — OP NOTE
Priapism drainage,  Operative Note     Date: 2024  OR Location: 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]     Procedures  Priapism drainage  Penile block  Injection of corpora [phone] [id number] - IA IRRIGATION CORPORA CAVERNOSA PRIAPISM      Surgeons      * Guru Godinez - Primary    Resident/Fellow/Other Assistant:  Surgeons and Role:     * Juanito Boss MD - Resident - Assisting    Procedure Summary  Anesthesia: General  ASA: II  Anesthesia Staff: Anesthesiologist: Anita Melendez MD  Anesthesia Resident: Beba Polanco DO  Estimated Blood Loss: 20mL  Intra-op Medications:   Administrations occurring from 1025 to 1215 on 24:   Medication Name Total Dose   sodium chloride 0.9 % irrigation solution 100 mL   lidocaine-epinephrine (Xylocaine W/EPI) 1 %-1:100,000 injection 20 mL              Anesthesia Record               Intraprocedure I/O Totals       None           Specimen: No specimens collected     Staff:   Circulator: Katt  Scrub Person: Gabriel         Drains and/or Catheters: * None in log *    Tourniquet Times:         Implants:     Findings: aspiration/irrigation performed at left lateral shaft near base of penis  500mcg phenylephrine injected at start with additional 500mcg injected at conclusion  penis somewhat bendable at start of procedure. Flaccid at end  Penile block performed    Indications: Joellen Narayan is an 23 y.o. 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 Patient did not tolerate bedside drainage in ED despite pre-medication. Therefore he was brought to OR.    The patient was seen in the preoperative area. The risks, benefits, complications, treatment options, non-operative alternatives,  expected recovery and outcomes were discussed with the patient. The possibilities of reaction to medication, pulmonary aspiration, injury to surrounding structures, bleeding, recurrent infection, the need for additional procedures, failure to diagnose a condition, and creating a complication requiring transfusion or operation were discussed with the patient. The patient concurred with the proposed plan, giving informed consent.  The site of surgery was properly noted/marked if necessary per policy. The patient has been actively warmed in preoperative area. Preoperative antibiotics have been ordered and given within 1 hours of incision. Venous thrombosis prophylaxis are not indicated.    Procedure Details:   After anesthesia was induced, the genital region was prepped with betadine and draped with towels.      Phenylephrine was diluted in normal saline. 500mcg of phenylephrince were injected in the right corpora at the base of the penis. A 16G angiocath needle was inserted into the left corpora at the base of the penis with return of dark blood. 20cc's of dark blood were aspirated. The corpora was irrigated with 60cc's of NS. We attempted aspiration once again with minimal return. The penis was detumesced at this point. An additional 500mcg of phenylephrine were injected in the right corpora. We then performed a dorsal penile block and ring block with 1% plain lidocaine. A penile wrap was applied.      Complications:  None; patient tolerated the procedure well.    Disposition: PACU - hemodynamically stable.  Condition: stable         Additional Details: observe in PACU for 1 hour with penile dressing, IV fluids, and O2. Will remove dressing in 1 hour and discharge home if penis is still bendable.    Attending Attestation: Present/involved for entirety of procedure    Guru Godinez  Phone Number: [phone]
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
## 2024-08-31 NOTE — ANESTHESIA PREPROCEDURE EVALUATION
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)      Hematology   (+) Chronic anemia   (+) Hodgkin's paragranuloma (Multi)   (+) 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)   (+) Sickle cell anemia with pain (Multi)   (+) Sickle cell crisis (Multi)   (+) Sickle cell disease with crisis and other complication (Multi)      Musculoskeletal   (+) Scoliosis      ID   (+) Chlamydial epididymitis      Skin   (+) Eczema       Clinical information reviewed:   Tobacco  Allergies  Meds   Med Hx  Surg Hx   Fam Hx  Soc Hx        NPO Detail:  NPO/Void Status  Date of Last Liquid: 08/30/24  Time of Last Liquid: 1700  Date of Last Solid: 08/30/24  Time of Last Solid: 1700  Time of Last Void: 1047         Physical Exam    Airway  Mallampati: III  Neck ROM: full     Cardiovascular - normal exam     Dental    Pulmonary - normal exam     Abdominal            Anesthesia Plan    History of general anesthesia?: yes  History of complications of general anesthesia?: no    ASA 2     general     Anesthetic plan and risks discussed with patient and mother.  Use of blood products discussed with patient and mother who.    Plan discussed with resident.

## 2024-08-31 NOTE — DISCHARGE INSTRUCTIONS
[facility] [place]  Discharge Summary    Joellen Narayan    DIET:  Resume Normal Diet    ANTIBIOTICS:  none    WOUND CARE:  - Ok to shower the day after surgery  - You may have some swelling around the base of the penis which is expected    ACTIVITY:  - Limit your activities and rest today.    FOLLOW UP APPOINTMENT:  Future Appointments   Date Time Provider Department Center   9/5/2024 11:00 AM RAAD Cannon-CNP [id number] Academic   9/19/2024  9:20 AM Melissa Stoll MD [id number] Academic   10/7/2024  8:30 AM Josiah Christiansen MD [facility]         CALL OUR OFFICE OR GO TO YOUR NEAREST EMERGENCY ROOM IF:  - You have fevers or chills at home.  - You develop shortness of breath.  - You have excessive bleeding from your wound.  - If your swelling significantly worsens very quickly  - Any other concerning symptoms.    NO SMOKING. THIS WILL SIGNIFICANTLY IMPAIR YOUR HEALING PROCESS.    QUESTIONS/CONCERNS?: Call Urology Office [phone]

## 2024-08-31 NOTE — ANESTHESIA POSTPROCEDURE EVALUATION
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])    Surgeons: Guru Godinez MD Responsible Provider: Anita Melendez MD    Anesthesia Type: general ASA Status: 2            Anesthesia Type: general    Vitals Value Taken Time   /86 08/31/24 1149   Temp 36.1 08/31/24 1149   Pulse 62 08/31/24 1149   Resp 14 08/31/24 1149   SpO2 100 08/31/24 1149       Anesthesia Post Evaluation    Patient location during evaluation: PACU  Patient participation: complete - patient participated  Level of consciousness: sleepy but conscious  Pain score: 2  Pain management: adequate  Multimodal analgesia pain management approach  Airway patency: patent  Two or more strategies used to mitigate risk of obstructive sleep apnea  Cardiovascular status: hemodynamically stable  Respiratory status: acceptable, T-piece, spontaneous ventilation and airway suctioned  Hydration status: euvolemic  Postoperative Nausea and Vomiting: none        No notable events documented.

## 2024-08-31 NOTE — PROCEDURES
Procedural Sedation    Consent: Written    Indications: Priapism Drainage    Performed by: AGNIESZKA Carey    Assistants: None     ASA Class: 1    Mallampati: 1    Sedation Agent: Ketamine     Procedure Details:    Time out performed.  Correct patient and procedure identified.  EtCo2 monitoring, continuous telemetry and pulse oximetry available.  Airway equipment at bedside including BVM, iGEL, and intubation supplies.  Patinet was given 100 mg of Ketamine.  Adequate sedation achieved.  Continuously monitored and remained arousable.      Complications: None
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
## 2024-09-01 ENCOUNTER — APPOINTMENT (OUTPATIENT)
Dept: RADIOLOGY | Facility: HOSPITAL | Age: 24
End: 2024-09-01
Payer: COMMERCIAL

## 2024-09-01 VITALS
TEMPERATURE: 97.5 F | OXYGEN SATURATION: 96 % | HEART RATE: 68 BPM | SYSTOLIC BLOOD PRESSURE: 129 MMHG | RESPIRATION RATE: 18 BRPM | DIASTOLIC BLOOD PRESSURE: 73 MMHG

## 2024-09-01 LAB
ACANTHOCYTES BLD QL SMEAR: ABNORMAL
ALBUMIN SERPL BCP-MCNC: 4.6 G/DL (ref 3.4–5)
ALP SERPL-CCNC: 126 U/L (ref 33–120)
ALT SERPL W P-5'-P-CCNC: 31 U/L (ref 10–52)
ANION GAP SERPL CALC-SCNC: 12 MMOL/L (ref 10–20)
AST SERPL W P-5'-P-CCNC: 56 U/L (ref 9–39)
BASOPHILS # BLD MANUAL: 0.1 X10*3/UL (ref 0–0.1)
BASOPHILS NFR BLD MANUAL: 0.9 %
BILIRUB SERPL-MCNC: 6.7 MG/DL (ref 0–1.2)
BUN SERPL-MCNC: 3 MG/DL (ref 6–23)
BURR CELLS BLD QL SMEAR: ABNORMAL
CA-I BLD-SCNC: 1.2 MMOL/L (ref 1.1–1.33)
CALCIUM SERPL-MCNC: 9.5 MG/DL (ref 8.6–10.6)
CHLORIDE SERPL-SCNC: 105 MMOL/L (ref 98–107)
CO2 SERPL-SCNC: 26 MMOL/L (ref 21–32)
CREAT SERPL-MCNC: 0.54 MG/DL (ref 0.5–1.3)
EGFRCR SERPLBLD CKD-EPI 2021: >90 ML/MIN/1.73M*2
EOSINOPHIL # BLD MANUAL: 0.1 X10*3/UL (ref 0–0.7)
EOSINOPHIL NFR BLD MANUAL: 0.9 %
ERYTHROCYTE [DISTWIDTH] IN BLOOD BY AUTOMATED COUNT: 22.9 % (ref 11.5–14.5)
GLUCOSE BLD MANUAL STRIP-MCNC: 94 MG/DL (ref 74–99)
GLUCOSE SERPL-MCNC: 56 MG/DL (ref 74–99)
HCT VFR BLD AUTO: 22.8 % (ref 41–52)
HGB BLD-MCNC: 7.9 G/DL (ref 13.5–17.5)
HGB RETIC QN: 31 PG (ref 28–38)
IMM GRANULOCYTES # BLD AUTO: 0.15 X10*3/UL (ref 0–0.7)
IMM GRANULOCYTES NFR BLD AUTO: 1.3 % (ref 0–0.9)
IMMATURE RETIC FRACTION: 22.7 %
LDH SERPL L TO P-CCNC: 560 U/L (ref 84–246)
LYMPHOCYTES # BLD MANUAL: 2.82 X10*3/UL (ref 1.2–4.8)
LYMPHOCYTES NFR BLD MANUAL: 25.2 %
MCH RBC QN AUTO: 30.2 PG (ref 26–34)
MCHC RBC AUTO-ENTMCNC: 34.6 G/DL (ref 32–36)
MCV RBC AUTO: 87 FL (ref 80–100)
MONOCYTES # BLD MANUAL: 0.48 X10*3/UL (ref 0.1–1)
MONOCYTES NFR BLD MANUAL: 4.3 %
NEUTROPHILS # BLD MANUAL: 7.59 X10*3/UL (ref 1.2–7.7)
NEUTS BAND # BLD MANUAL: 0.1 X10*3/UL (ref 0–0.7)
NEUTS BAND NFR BLD MANUAL: 0.9 %
NEUTS SEG # BLD MANUAL: 7.49 X10*3/UL (ref 1.2–7)
NEUTS SEG NFR BLD MANUAL: 66.9 %
NRBC BLD-RTO: 2.1 /100 WBCS (ref 0–0)
OVALOCYTES BLD QL SMEAR: ABNORMAL
PLATELET # BLD AUTO: 410 X10*3/UL (ref 150–450)
POLYCHROMASIA BLD QL SMEAR: ABNORMAL
POTASSIUM SERPL-SCNC: 4.4 MMOL/L (ref 3.5–5.3)
PROT SERPL-MCNC: 7 G/DL (ref 6.4–8.2)
RBC # BLD AUTO: 2.62 X10*6/UL (ref 4.5–5.9)
RBC MORPH BLD: ABNORMAL
RETICS #: 0.73 X10*6/UL (ref 0.02–0.12)
RETICS/RBC NFR AUTO: 27.9 % (ref 0.5–2)
SCHISTOCYTES BLD QL SMEAR: ABNORMAL
SICKLE CELLS BLD QL SMEAR: ABNORMAL
SODIUM SERPL-SCNC: 139 MMOL/L (ref 136–145)
TOTAL CELLS COUNTED BLD: 115
VARIANT LYMPHS # BLD MANUAL: 0.1 X10*3/UL (ref 0–0.5)
VARIANT LYMPHS NFR BLD: 0.9 %
WBC # BLD AUTO: 11.2 X10*3/UL (ref 4.4–11.3)

## 2024-09-01 PROCEDURE — 82947 ASSAY GLUCOSE BLOOD QUANT: CPT

## 2024-09-01 PROCEDURE — 2500000001 HC RX 250 WO HCPCS SELF ADMINISTERED DRUGS (ALT 637 FOR MEDICARE OP)

## 2024-09-01 PROCEDURE — 2500000005 HC RX 250 GENERAL PHARMACY W/O HCPCS

## 2024-09-01 PROCEDURE — 83615 LACTATE (LD) (LDH) ENZYME: CPT

## 2024-09-01 PROCEDURE — 36415 COLL VENOUS BLD VENIPUNCTURE: CPT

## 2024-09-01 PROCEDURE — 2500000004 HC RX 250 GENERAL PHARMACY W/ HCPCS (ALT 636 FOR OP/ED)

## 2024-09-01 PROCEDURE — 99233 SBSQ HOSP IP/OBS HIGH 50: CPT

## 2024-09-01 PROCEDURE — 82330 ASSAY OF CALCIUM: CPT

## 2024-09-01 PROCEDURE — 80053 COMPREHEN METABOLIC PANEL: CPT

## 2024-09-01 PROCEDURE — 93980 PENILE VASCULAR STUDY: CPT

## 2024-09-01 PROCEDURE — 85027 COMPLETE CBC AUTOMATED: CPT

## 2024-09-01 PROCEDURE — 85007 BL SMEAR W/DIFF WBC COUNT: CPT

## 2024-09-01 PROCEDURE — 2500000004 HC RX 250 GENERAL PHARMACY W/ HCPCS (ALT 636 FOR OP/ED): Performed by: PHYSICIAN ASSISTANT

## 2024-09-01 PROCEDURE — 85045 AUTOMATED RETICULOCYTE COUNT: CPT

## 2024-09-01 PROCEDURE — 1200000003 HC ONCOLOGY  ROOM WITH TELEMETRY DAILY

## 2024-09-01 PROCEDURE — 93980 PENILE VASCULAR STUDY: CPT | Performed by: STUDENT IN AN ORGANIZED HEALTH CARE EDUCATION/TRAINING PROGRAM

## 2024-09-01 RX ORDER — PSEUDOEPHEDRINE HYDROCHLORIDE 60 MG/1
60 TABLET ORAL EVERY 8 HOURS
Status: DISCONTINUED | OUTPATIENT
Start: 2024-09-01 | End: 2024-09-06

## 2024-09-01 RX ORDER — GABAPENTIN 100 MG/1
100 CAPSULE ORAL EVERY 8 HOURS SCHEDULED
Status: DISCONTINUED | OUTPATIENT
Start: 2024-09-01 | End: 2024-09-09 | Stop reason: HOSPADM

## 2024-09-01 RX ORDER — HYDROMORPHONE HCL/0.9% NACL/PF 15 MG/30ML
PATIENT CONTROLLED ANALGESIA SYRINGE INTRAVENOUS CONTINUOUS
Status: DISCONTINUED | OUTPATIENT
Start: 2024-09-01 | End: 2024-09-02

## 2024-09-01 RX ORDER — HYDROMORPHONE HYDROCHLORIDE 1 MG/ML
1 INJECTION, SOLUTION INTRAMUSCULAR; INTRAVENOUS; SUBCUTANEOUS ONCE
Status: COMPLETED | OUTPATIENT
Start: 2024-09-01 | End: 2024-09-01

## 2024-09-01 RX ORDER — BACLOFEN 10 MG/1
5 TABLET ORAL 3 TIMES DAILY
Status: DISCONTINUED | OUTPATIENT
Start: 2024-09-01 | End: 2024-09-09 | Stop reason: HOSPADM

## 2024-09-01 RX ORDER — PSEUDOEPHEDRINE HYDROCHLORIDE 60 MG/1
60 TABLET ORAL EVERY 4 HOURS PRN
Status: DISCONTINUED | OUTPATIENT
Start: 2024-09-01 | End: 2024-09-01

## 2024-09-01 RX ORDER — SODIUM CHLORIDE, SODIUM LACTATE, POTASSIUM CHLORIDE, CALCIUM CHLORIDE 600; 310; 30; 20 MG/100ML; MG/100ML; MG/100ML; MG/100ML
75 INJECTION, SOLUTION INTRAVENOUS CONTINUOUS
Status: DISCONTINUED | OUTPATIENT
Start: 2024-09-01 | End: 2024-09-03

## 2024-09-01 RX ORDER — PHENYLEPHRINE HYDROCHLORIDE 10 MG/ML
10000 INJECTION INTRAVENOUS ONCE
Status: COMPLETED | OUTPATIENT
Start: 2024-09-01 | End: 2024-09-01

## 2024-09-01 RX ORDER — IBUPROFEN 400 MG/1
200 TABLET ORAL EVERY 8 HOURS PRN
Status: DISCONTINUED | OUTPATIENT
Start: 2024-09-01 | End: 2024-09-09 | Stop reason: HOSPADM

## 2024-09-01 RX ORDER — NALOXONE HYDROCHLORIDE 0.4 MG/ML
0.2 INJECTION, SOLUTION INTRAMUSCULAR; INTRAVENOUS; SUBCUTANEOUS AS NEEDED
Status: DISCONTINUED | OUTPATIENT
Start: 2024-09-01 | End: 2024-09-09 | Stop reason: HOSPADM

## 2024-09-01 RX ADMIN — PSEUDOEPHEDRINE HCL 60 MG: 30 TABLET, FILM COATED ORAL at 05:12

## 2024-09-01 RX ADMIN — Medication 2 L/MIN: at 21:47

## 2024-09-01 RX ADMIN — PSEUDOEPHEDRINE HCL 60 MG: 30 TABLET, FILM COATED ORAL at 01:22

## 2024-09-01 RX ADMIN — GABAPENTIN 100 MG: 100 CAPSULE ORAL at 17:30

## 2024-09-01 RX ADMIN — HYDROMORPHONE HYDROCHLORIDE 2 MG: 1 INJECTION, SOLUTION INTRAMUSCULAR; INTRAVENOUS; SUBCUTANEOUS at 00:52

## 2024-09-01 RX ADMIN — HYDROMORPHONE HYDROCHLORIDE 2 MG: 1 INJECTION, SOLUTION INTRAMUSCULAR; INTRAVENOUS; SUBCUTANEOUS at 08:26

## 2024-09-01 RX ADMIN — FOLIC ACID 1 MG: 1 TABLET ORAL at 08:32

## 2024-09-01 RX ADMIN — BACLOFEN 5 MG: 10 TABLET ORAL at 17:29

## 2024-09-01 RX ADMIN — HYDROMORPHONE HYDROCHLORIDE 2 MG: 1 INJECTION, SOLUTION INTRAMUSCULAR; INTRAVENOUS; SUBCUTANEOUS at 06:21

## 2024-09-01 RX ADMIN — HYDROMORPHONE HYDROCHLORIDE 2 MG: 1 INJECTION, SOLUTION INTRAMUSCULAR; INTRAVENOUS; SUBCUTANEOUS at 10:30

## 2024-09-01 RX ADMIN — BACLOFEN 5 MG: 10 TABLET ORAL at 21:39

## 2024-09-01 RX ADMIN — GABAPENTIN 100 MG: 100 CAPSULE ORAL at 21:40

## 2024-09-01 RX ADMIN — Medication 2 L/MIN: at 08:33

## 2024-09-01 RX ADMIN — PHENYLEPHRINE HYDROCHLORIDE 10000 MCG: 10 INJECTION INTRAVENOUS at 21:47

## 2024-09-01 RX ADMIN — Medication: at 20:46

## 2024-09-01 RX ADMIN — HYDROMORPHONE HYDROCHLORIDE 2 MG: 1 INJECTION, SOLUTION INTRAMUSCULAR; INTRAVENOUS; SUBCUTANEOUS at 03:02

## 2024-09-01 RX ADMIN — Medication: at 16:51

## 2024-09-01 RX ADMIN — SODIUM CHLORIDE, POTASSIUM CHLORIDE, SODIUM LACTATE AND CALCIUM CHLORIDE 75 ML/HR: 600; 310; 30; 20 INJECTION, SOLUTION INTRAVENOUS at 12:04

## 2024-09-01 RX ADMIN — Medication: at 12:04

## 2024-09-01 RX ADMIN — HYDROMORPHONE HYDROCHLORIDE 1 MG: 1 INJECTION, SOLUTION INTRAMUSCULAR; INTRAVENOUS; SUBCUTANEOUS at 04:03

## 2024-09-01 RX ADMIN — PSEUDOEPHEDRINE HYDROCHLORIDE 60 MG: 60 TABLET ORAL at 17:30

## 2024-09-01 RX ADMIN — PSEUDOEPHEDRINE HCL 60 MG: 30 TABLET, FILM COATED ORAL at 10:29

## 2024-09-01 RX ADMIN — PANTOPRAZOLE SODIUM 20 MG: 20 TABLET, DELAYED RELEASE ORAL at 05:15

## 2024-09-01 ASSESSMENT — COGNITIVE AND FUNCTIONAL STATUS - GENERAL
MOBILITY SCORE: 24
DAILY ACTIVITIY SCORE: 23
DAILY ACTIVITIY SCORE: 24
MOBILITY SCORE: 24
DAILY ACTIVITIY SCORE: 24
DRESSING REGULAR UPPER BODY CLOTHING: A LITTLE
MOBILITY SCORE: 24

## 2024-09-01 ASSESSMENT — PAIN SCALES - GENERAL
PAINLEVEL_OUTOF10: 9
PAINLEVEL_OUTOF10: 7
PAINLEVEL_OUTOF10: 10 - WORST POSSIBLE PAIN
PAINLEVEL_OUTOF10: 9
PAINLEVEL_OUTOF10: 10 - WORST POSSIBLE PAIN
PAINLEVEL_OUTOF10: 6
PAINLEVEL_OUTOF10: 8
PAINLEVEL_OUTOF10: 10 - WORST POSSIBLE PAIN
PAINLEVEL_OUTOF10: 10 - WORST POSSIBLE PAIN
PAINLEVEL_OUTOF10: 8
PAINLEVEL_OUTOF10: 8
PAINLEVEL_OUTOF10: 10 - WORST POSSIBLE PAIN
PAINLEVEL_OUTOF10: 10 - WORST POSSIBLE PAIN

## 2024-09-01 ASSESSMENT — PAIN - FUNCTIONAL ASSESSMENT
PAIN_FUNCTIONAL_ASSESSMENT: 0-10

## 2024-09-01 ASSESSMENT — PAIN DESCRIPTION - LOCATION
LOCATION: PENIS

## 2024-09-01 ASSESSMENT — ACTIVITIES OF DAILY LIVING (ADL): EFFECT OF PAIN ON DAILY ACTIVITIES: HARDER TO MOVE

## 2024-09-01 ASSESSMENT — PAIN DESCRIPTION - DESCRIPTORS: DESCRIPTORS: SHARP;THROBBING
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
## 2024-09-01 NOTE — PROGRESS NOTES
Joellen Narayan is a 23 y.o. 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    Subjective   Overnight he has continued to have erections on/off with associated penile pain. Endorses detumescence between erections.        Objective     Physical Exam  Constitutional: awake and alert, resting in bed appearing uncomfortable but not in acute distress  Head/neck: normocephalic / atraumatic  Eyes: EOMI, sclerae anicteric  ENT: moist mucous membranes  Pulm: Breathing comfortably on 4LNC  CV: Regular rate  Abd: Soft, nontender, nondistended  : Circ phallus with orthotopic meatus, rigid erection with nonbendable shaft, tender to palpation  Psych: Appropriate mood and behavior     Last Recorded Vitals  Blood pressure 123/67, pulse 71, temperature 36.5 °C (97.7 °F), temperature source Temporal, resp. rate 20, SpO2 95%.  Intake/Output last 3 Shifts:  No intake/output data recorded.    Relevant Results  Scheduled medications  enoxaparin, 40 mg, subcutaneous, q24h  folic acid, 1 mg, oral, Daily  oxygen, , inhalation, Continuous - Inhalation  pantoprazole, 20 mg, oral, Daily before breakfast      Continuous medications     PRN medications  PRN medications: diphenhydrAMINE, docusate sodium **OR** polyethylene glycol, HYDROmorphone, naloxone, ondansetron ODT, pseudoephedrine    Results for orders placed or performed during the hospital encounter of 08/31/24 (from the past 24 hour(s))   CBC and Auto Differential   Result Value Ref Range    WBC 8.8 4.4 - 11.3 x10*3/uL    nRBC 2.8 (H) 0.0 - 0.0 /100 WBCs    RBC 2.29 (L) 4.50 - 5.90 x10*6/uL    Hemoglobin 6.9 (L) 13.5 - 17.5 g/dL    Hematocrit 18.6 (L) 41.0 - 52.0 %    MCV 81 80 - 100 fL    MCH 30.1 26.0 - 34.0 pg    MCHC 37.1 (H) 32.0 - 36.0 g/dL    RDW 21.0 (H) 11.5 - 14.5 %    Platelets 356 150 - 450 x10*3/uL    Neutrophils % 65.1 40.0 - 80.0 %    Immature Granulocytes %, Automated 1.6 (H) 0.0 - 0.9 %    Lymphocytes % 18.9 13.0 - 44.0 %     Monocytes % 12.3 2.0 - 10.0 %    Eosinophils % 1.9 0.0 - 6.0 %    Basophils % 0.2 0.0 - 2.0 %    Neutrophils Absolute 5.71 1.20 - 7.70 x10*3/uL    Immature Granulocytes Absolute, Automated 0.14 0.00 - 0.70 x10*3/uL    Lymphocytes Absolute 1.66 1.20 - 4.80 x10*3/uL    Monocytes Absolute 1.08 (H) 0.10 - 1.00 x10*3/uL    Eosinophils Absolute 0.17 0.00 - 0.70 x10*3/uL    Basophils Absolute 0.02 0.00 - 0.10 x10*3/uL   Comprehensive Metabolic Panel   Result Value Ref Range    Glucose 93 74 - 99 mg/dL    Sodium 139 136 - 145 mmol/L    Potassium 3.9 3.5 - 5.3 mmol/L    Chloride 106 98 - 107 mmol/L    Bicarbonate 25 21 - 32 mmol/L    Anion Gap 12 10 - 20 mmol/L    Urea Nitrogen 7 6 - 23 mg/dL    Creatinine 0.70 0.50 - 1.30 mg/dL    eGFR >90 >60 mL/min/1.73m*2    Calcium 8.8 8.6 - 10.6 mg/dL    Albumin 4.0 3.4 - 5.0 g/dL    Alkaline Phosphatase 121 (H) 33 - 120 U/L    Total Protein 5.9 (L) 6.4 - 8.2 g/dL    AST 43 (H) 9 - 39 U/L    Bilirubin, Total 6.0 (H) 0.0 - 1.2 mg/dL    ALT 29 10 - 52 U/L   Lactate Dehydrogenase   Result Value Ref Range     (H) 84 - 246 U/L   Reticulocytes   Result Value Ref Range    Retic % 28.8 (H) 0.5 - 2.0 %    Retic Absolute 0.660 (H) 0.022 - 0.118 x10*6/uL    Reticulocyte Hemoglobin 30 28 - 38 pg    Immature Retic fraction 19.8 (H) <=16.0 %   Morphology   Result Value Ref Range    RBC Morphology See Below     Polychromasia Mild     RBC Fragments Few     Sickle Cells Many     Target Cells Few     [place] Bodies Present     Pappenheimer Bodies Present        ECG 12 Lead    Result Date: 8/26/2024  Normal sinus rhythm Minimal voltage criteria for LVH, may be normal variant Nonspecific T wave abnormality Abnormal ECG No previous ECGs available Confirmed by Raúl Judge ([id number]) on 8/26/2024 9:59:22 PM    Transthoracic Echo (TTE) Complete    Result Date: 8/24/2024   [facility], [address] [place] [address]                Tel [phone] and Fax [phone]  TRANSTHORACIC ECHOCARDIOGRAM REPORT  Patient Name:      AMARIS BLANCO      Reading Physician:    [id number] Jagjit Newberry MD Study Date:        8/24/2024            Ordering Provider:    [id number] WOLF VIERA MRN/PID:           [id number]             Fellow: Accession#:        [id number]         Nurse:                Jeanne Jones RN Date of Birth/Age: 2000 / 23 years Sonographer:          Brooklyn Ramos                                                               RDCS Gender:            M                    Additional Staff: Height:            157.48 cm            Admit Date:           8/23/2024 Weight:            69.40 kg             Admission Status:     Inpatient -                                                               Routine BSA / BMI:         1.71 m2 / 27.98      Encounter#:           [id number]                    kg/m2 Blood Pressure:    117/66 mmHg          Department Location:  [facility] Non                                                               Invasive Study Type:    TRANSTHORACIC ECHO (TTE) COMPLETE Diagnosis/ICD: 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 Study Detail: The following Echo studies were performed: 2D, M-Mode, Doppler and               color flow. Agitated saline used as a contrast agent for               intraseptal flow evaluation.  PHYSICIAN INTERPRETATION: Left Ventricle: Left ventricular ejection fraction is normal, by visual estimate at 60-65%. There are no regional left ventricular wall motion abnormalities. The left ventricular cavity size is severely dilated. Spectral Doppler shows a normal pattern of left ventricular diastolic filling. Left Atrium: The left atrium is severely dilated. A  bubble study using agitated saline was performed. There is a delayed appearance of saline contrast bubbles noted in the left atrium, which is indicative of intrapulmonary shunting. Right Ventricle: The right ventricle is mildly enlarged. There is normal right ventricular global systolic function. Right Atrium: The right atrium is mildly dilated. Aortic Valve: The aortic valve is trileaflet. There are increased aortic valve velocities due to increased flow/dynamic ejection. There is no evidence of aortic valve regurgitation. The peak instantaneous gradient of the aortic valve is 11.3 mmHg. Mitral Valve: The mitral valve is normal in structure. There is trace mitral valve regurgitation. Tricuspid Valve: The tricuspid valve is structurally normal. There is trace tricuspid regurgitation. The right ventricular systolic pressure is unable to be estimated. Pulmonic Valve: The pulmonic valve is structurally normal. There is trace pulmonic valve regurgitation. Pericardium: There is a trivial pericardial effusion. Aorta: The aortic root is normal. Systemic Veins: The inferior vena cava appears to be of normal size. There is IVC inspiratory collapse greater than 50%. In comparison to the previous echocardiogram(s): Compared with study dated 1/4/2024, no significant change.  CONCLUSIONS:  1. Left ventricular ejection fraction is normal, by visual estimate at 60-65%.  2. Left ventricular cavity size is severely dilated.  3. There is normal right ventricular global systolic function.  4. Mildly enlarged right ventricle.  5. The left atrium is severely dilated.  6. A bubble study shows that an intrapulmonary shunting is present. QUANTITATIVE DATA SUMMARY: 2D MEASUREMENTS:                          Normal Ranges: Ao Root d:     3.10 cm   (2.0-3.7cm) LAs:           4.10 cm   (2.7-4.0cm) IVSd:          0.70 cm   (0.6-1.1cm) LVPWd:         0.70 cm   (0.6-1.1cm) LVIDd:         5.80 cm   (3.9-5.9cm) LVIDs:         4.10 cm LV Mass Index:  87 g/m2 LVEDV Index:   100 ml/m2 LV % FS        29.3 % LA VOLUME:                             Normal Ranges: LA Volume Index: 56.3 ml/m2 RA VOLUME BY A/L METHOD:                       Normal Ranges: RA Area A4C: 21.7 cm2 AORTA MEASUREMENTS:                    Normal Ranges: Asc Ao, d: 3.20 cm (2.1-3.4cm) LV SYSTOLIC FUNCTION BY 2D PLANIMETRY (MOD):                      Normal Ranges: EF-A4C View:    62 % (>=55%) EF-A2C View:    67 % EF-Biplane:     65 % EF-Visual:      63 % LV EF Reported: 63 % LV DIASTOLIC FUNCTION:                               Normal Ranges: MV Peak E:        1.05 m/s    (0.7-1.2 m/s) MV Peak A:        0.51 m/s    (0.42-0.7 m/s) E/A Ratio:        2.08        (1.0-2.2) MV e'             0.154 m/s   (>8.0) MV lateral e'     0.19 m/s MV medial e'      0.12 m/s MV A Dur:         132.00 msec E/e' Ratio:       6.84        (<8.0) MV DT:            116 msec    (150-240 msec) PulmV Sys Abel:    76.20 cm/s PulmV Pisano Abel:   75.70 cm/s PulmV S/D Abel:    1.00 PulmV A Revs Abel: 21.90 cm/s PulmV A Revs Dur: 116.00 msec AORTIC VALVE:                          Normal Ranges: AoV Vmax:      1.68 m/s  (<=1.7m/s) AoV Peak P.3 mmHg (<20mmHg) LVOT Max Abel:  1.30 m/s  (<=1.1m/s) LVOT VTI:      22.70 cm LVOT Diameter: 2.40 cm   (1.8-2.4cm) AoV Area,Vmax: 3.50 cm2  (2.5-4.5cm2)  RIGHT VENTRICLE: RV Basal 4.80 cm RV Mid   3.70 cm RV Major 7.3 cm TAPSE:   29.7 mm RV s'    0.17 m/s PULMONIC VALVE:                         Normal Ranges: PV Accel Time: 116 msec (>120ms) PV Max Abel:    1.3 m/s  (0.6-0.9m/s) PV Max P.9 mmHg Pulmonary Veins: PulmV A Revs Dur: 116.00 msec PulmV A Revs Abel: 21.90 cm/s PulmV Pisano Abel:   75.70 cm/s PulmV S/D Abel:    1.00 PulmV Sys Abel:    76.20 cm/s  [id number] Jagjit Newberry MD Electronically signed on 2024 at 1:59:27 PM  ** Final **     XR chest 1 view    Result Date: 2024  Interpreted By:  Rogelio Tsang and Kamau Nyokabi STUDY: XR CHEST 1 VIEW;  2024 9:04 pm   INDICATION:  Signs/Symptoms:Chest pain.   COMPARISON: Chest x-ray 08/20/2024 at 7:44 a.m., CT abdomen and pelvis 08/23/2024   ACCESSION NUMBER(S): [id number]   ORDERING CLINICIAN: MYKE CHANEY   FINDINGS:   AP radiographs of the chest.   CARDIOMEDIASTINAL SILHOUETTE: Cardiomediastinal silhouette is normal in size and configuration.   LUNGS: Bilateral lungs appear clear. No pneumothorax, pleural effusion, or focal consolidation.     ABDOMEN: No remarkable upper abdominal findings.   BONES: No acute osseous changes.       1.  No evidence of acute cardiopulmonary process.   I personally reviewed the images/study and I agree with Dr. Darvin Peña findings as stated. This study was interpreted at [place], [place]   MACRO: None   Signed by: Rogelio Tsang 8/24/2024 1:37 PM Dictation workstation:   [id number]    ECG 12 lead    Result Date: 8/23/2024  Normal sinus rhythm Minimal voltage criteria for LVH, may be normal variant ( R in aVL ) Cannot rule out Inferior infarct , age undetermined Abnormal ECG When compared with ECG of 20-AUG-2024 22:24, No significant change was found See ED provider note for full interpretation and clinical correlation Confirmed by Dipti Murrieta ([id number]) on 8/23/2024 10:44:08 PM    XR chest 1 view    Result Date: 8/23/2024  Interpreted By:  Madelyn Leroy and Dulla Kireeti STUDY: XR CHEST 1 VIEW;  8/23/2024 7:51 am   INDICATION: Signs/Symptoms:sickle cell, dyspnea.   COMPARISON: Chest xray from 8/21/24.   ACCESSION NUMBER(S): [id number]   ORDERING CLINICIAN: YAIR LEWIS   FINDINGS: AP radiograph of the chest was provided.     CARDIOMEDIASTINAL SILHOUETTE: Cardiomediastinal silhouette is normal in size and configuration.   LUNGS: Lungs are clear.   ABDOMEN: No remarkable upper abdominal findings.   BONES: No acute osseous changes.       1.  No evidence of acute cardiopulmonary process.   I personally reviewed the images/study and I agree with the findings  as stated by Meredith Long MD, PGY-2 this study was interpreted at [facility], [place], [place].   MACRO: None   Signed by: Madelyn Leroy 8/23/2024 9:54 AM Dictation workstation:   [id number]    CT angio chest for pulmonary embolism    Addendum Date: 8/23/2024    Interpreted By:  Rogelio Tsang, ADDENDUM: The spleen is atrophic consistent with the history of sickle cell disease. Benign 3 mm left lower lobe fissural lung nodule. Mild enlargement of left ventricular cavity consistent with history of sickle cell. Incidental note made of direct origin of the left vertebral artery a normal variant.   Signed by: Rogelio Tsang 8/23/2024 9:50 AM   -------- ORIGINAL REPORT -------- Dictation workstation:   [id number]    Result Date: 8/23/2024  Interpreted By:  Rogelio Tsang and Awan Komal STUDY: CT ANGIO CHEST FOR PULMONARY EMBOLISM;  8/23/2024 8:51 am   INDICATION: Signs/Symptoms:sickle cell, hypoxic. A 23-year-old male with past medical history of sickle cell, lymphoma and left heart failure presented with right flank pain and priapism. He is also experiencing some shortness of breath without chest pain.   COMPARISON: CT CAP 06/18/2024, CT angio chest 02/25/2024, 02/16/2024   ACCESSION NUMBER(S): [id number]   ORDERING CLINICIAN: YAIR LEWIS   TECHNIQUE: Helical data acquisition of the chest was obtained after intravenous administration of 100 mL Omnipaque 350, as per PE protocol. Images were reformatted in coronal and sagittal planes. Axial and coronal maximum intensity projection (MIP) images were created and reviewed.   FINDINGS: POTENTIAL LIMITATIONS OF THE STUDY: None   HEART AND VESSELS: There are no discrete filling defects within main pulmonary artery and its branches to suggest acute pulmonary embolism. Main pulmonary artery and its branches are normal in caliber.   The thoracic aorta normal in course and caliber. No coronary artery calcifications are seen.  Please note, the study is not optimized for evaluation of coronary arteries.   The cardiac chambers are not enlarged.   There is no pericardial effusion seen.   MEDIASTINUM AND ANA, LOWER NECK AND AXILLA: The visualized thyroid gland is within normal limits. No evidence of thoracic lymphadenopathy by CT criteria. Esophagus appears within normal limits as seen.   LUNGS AND AIRWAYS: The trachea and central airways are patent. No endobronchial lesion is seen.   The bilateral lungs are clear without evidence of focal consolidation, pleural effusion, or pneumothorax.     UPPER ABDOMEN: The visualized subdiaphragmatic structures demonstrate no remarkable findings.       CHEST WALL AND OSSEOUS STRUCTURES: Chest wall is within normal limits. No acute osseous pathology.There are no suspicious osseous lesions.       1. No evidence of acute pulmonary embolism.   MACRO: None   Signed by: Rogelio Tsang 8/23/2024 9:46 AM Dictation workstation:   [id number]    CT abdomen pelvis w IV contrast    Result Date: 8/23/2024  Interpreted By:  Curtis Johnson and Booth Cameron STUDY: CT ABDOMEN PELVIS W IV CONTRAST;  8/23/2024 8:51 am   INDICATION: Signs/Symptoms: History of sickle cell and lymphoma with pain in RUQ/flank.   COMPARISON: CT chest abdomen pelvis 06/18/2024   ACCESSION NUMBER(S): [id number]   ORDERING CLINICIAN: YAIR LEWIS   TECHNIQUE: CT of the abdomen and pelvis was performed.  Standard contiguous axial images were obtained at 3 mm slice thickness through the abdomen and pelvis. Coronal and sagittal reconstructions at 3 mm slice thickness were performed.   100 ml of contrast Omnipaque were administered intravenously without immediate complication.   FINDINGS: LOWER CHEST: Please refer to concurrently imaged and separately dictated CTA of the chest.   ABDOMEN:   LIVER: The liver is enlarged and measures 19.4 cm in craniocaudal dimension. Surface contour is smooth without evidence of focal lesions.   BILE DUCTS: The  intrahepatic and extrahepatic ducts are not dilated.   GALLBLADDER: Gallbladder appears contracted and contains several radiopaque stones including a particularly large stone measuring up to 1.2 x 1.1 cm.   PANCREAS: The pancreas appears unremarkable without evidence of ductal dilatation or masses.   SPLEEN: The spleen is shrunken reflective of autoinfarction.   ADRENAL GLANDS: Bilateral adrenal glands appear normal.   KIDNEYS AND URETERS: The kidneys are normal in size and enhance symmetrically.  No hydroureteronephrosis or nephroureterolithiasis is identified.   PELVIS:   BLADDER: The urinary bladder appears normal without abnormal wall thickening.   REPRODUCTIVE ORGANS: The prostate is not enlarged.   BOWEL: The stomach is unremarkable.   There is fecalization of several distal small bowel loops, consistent slowed transit time. Moderate stool burden noted throughout the large bowel. The appendix appears normal.   VESSELS: There is no aneurysmal dilatation of the abdominal aorta. The IVC appears normal.   PERITONEUM/RETROPERITONEUM/LYMPH NODES: There is no free or loculated fluid collection, no free intraperitoneal air. The retroperitoneum appears normal.  Unchanged appearance of prominent retroperitoneal lymph nodes on the left, the largest of which measures up to 2.6 x 1.6 cm (series 501, image 54).   BONES AND ABDOMINAL WALL: No suspicious osseous lesions are identified.  The abdominal wall soft tissues appear normal.       1.  No acute abnormality within the abdomen or pelvis. 2. Fecalization of several nondilated distal small bowel loops with moderate colonic stool burden, consistent with slowed intestinal transit time and constipation. 3. Incidental findings unchanged as detailed above.   I personally reviewed the images/study and I agree with the findings as stated. This study was interpreted at [facility], [place], [place].   MACRO: None   Signed by: Curtis Johnson 8/23/2024  9:29 AM Dictation workstation:   [id number]    US gallbladder    Result Date: 8/21/2024  Interpreted By:  Curtis Johnson and Summerville Lesley STUDY: US GALLBLADDER;  8/21/2024 6:13 am   INDICATION: Signs/Symptoms:r/o choleycystits.   COMPARISON: Ultrasound liver 07/14/2024   ACCESSION NUMBER(S): [id number]   ORDERING CLINICIAN: DAYANARA PAGE   TECHNIQUE: Multiple images of the right upper quadrant were obtained.   FINDINGS: LIVER: The liver is enlarged and measures 20.6 cm and is grossly unremarkable and free of any focal lesions.   There are several well-circumscribed hypoechoic nodules near the pancreatic head and gallbladder as previously seen on prior liver ultrasound, likely prominent lymph nodes.   GALLBLADDER: The gallbladder is nondistended. Multiple intraluminal echogenic gallstones demonstrating posterior acoustic shadowing. No significant pericholecystic fluid or wall thickening. The gallbladder wall thickness is 0.17 cm. Sonographic Roman's sign is negative. BILE DUCTS: No evidence of intra or extrahepatic biliary dilatation is identified; the common bile duct measures 3.0 mm.   PANCREAS: The pancreas is poorly visualized due to overlying bowel gas.   RIGHT KIDNEY: The right kidney measures 11.7 cm in length. The renal cortical echogenicity and thickness are within normal limit.  No hydronephrosis or renal calculi are seen.       1. Cholelithiasis without sonographic evidence of acute cholecystitis. 2. Several well-circumscribed peripancreatic hypoechoic lesions which were seen on prior liver ultrasound dated 07/14/2024, and compatible with prominent lymph nodes also noted on 06/18/2024 CT. 3. Hepatomegaly measuring up to 20.6 cm.   I personally reviewed the image(s)/study and resident interpretation as stated by Dr. Heidi Contreras MD. I agree with the findings as stated. This study was interpreted at [facility], [place], [place].   MACRO: None   Signed by:  Curtis Johnson 8/21/2024 9:20 AM Dictation workstation:   [id number]    XR chest 2 views    Result Date: 8/21/2024  Interpreted By:  Brad Marmolejo and Summerville Lesley STUDY: XR CHEST 2 VIEWS;  8/21/2024 5:27 am   INDICATION: Signs/Symptoms:chest pain.   COMPARISON: Chest radiograph 08/11/2024   ACCESSION NUMBER(S): [id number]   ORDERING CLINICIAN: DAYANARA PAGE   FINDINGS: PA and lateral radiographs of the chest were provided.   CARDIOMEDIASTINAL SILHOUETTE: The cardiomediastinal silhouette is normal in size and configuration.   LUNGS: No abnormal airspace opacity, effusion, or pneumothorax.   ABDOMEN: No remarkable upper abdominal findings.   BONES: No acute osseous abnormality.       1. No acute abnormality of the chest.   I personally reviewed the image(s)/study and resident interpretation as stated by Dr. Heidi Contreras MD. I agree with the findings as stated. This study was interpreted at [facility], [place], [place].   MACRO: None   Signed by: Brad Marmolejo 8/21/2024 8:42 AM Dictation workstation:   [id number]    ECG 12 lead    Result Date: 8/21/2024  Normal sinus rhythm Nonspecific T wave abnormality Abnormal ECG When compared with ECG of 11-AUG-2024 05:55, T wave inversion now evident in Inferior leads Nonspecific T wave abnormality now evident in Lateral leads See ED provider note for full interpretation and clinical correlation Confirmed by Dipti Murrieta ([id number]) on 8/21/2024 3:05:21 AM    ECG 12 lead    Result Date: 8/11/2024  Normal sinus rhythm Normal ECG When compared with ECG of 11-JUL-2024 12:02, Sinus rhythm has replaced Ectopic atrial rhythm Non-specific change in ST segment in Anterior leads T wave inversion no longer evident in Inferior leads Nonspecific T wave abnormality no longer evident in Anterior leads See ED provider note for full interpretation and clinical correlation Confirmed by Fe Noguera ([id number]) on 8/11/2024 9:57:21 PM    XR chest 1  view    Result Date: 8/11/2024  Interpreted By:  Omi Petty and Ritchie Brandon STUDY: XR CHEST 1 VIEW;  8/11/2024 9:04 am   INDICATION: Signs/Symptoms:Chest pain in sickle cell.   COMPARISON: Chest x-ray 07/11/2024. CT cap 06/18/2024.   ACCESSION NUMBER(S): [id number]   ORDERING CLINICIAN: BRENNAN STEIN   FINDINGS: AP radiograph of the chest was provided.   CARDIOMEDIASTINAL SILHOUETTE: Cardiomediastinal silhouette is normal in size and configuration.   LUNGS: No consolidation, pleural effusion, or pneumothorax.   ABDOMEN: No remarkable upper abdominal findings.   BONES: No acute osseous changes.       No evidence of acute cardiopulmonary process.   I personally reviewed the images/study and I agree with the findings as stated by resident Vega Razo. This study was interpreted at [place], [place].   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 male with recurrent ischemic priapism. Drainage initially was attempted in the ED on 8/31 but patient was unable to tolerate. He was planned for operative drainage but had spontaneous detumescence prior to the planned procedure. Overnight 8/31 - 9/1 he continued to have intermittent erections with detumescence between episodes.    Recommendations:  - Continue supplemental oxygen, hydration, and analgesia  - If erection stays up for 4 hours uninterrupted, please obtain a penile doppler ultrasound  - If unable to obtain detumescence with conservative measures and doppler consistent with ischemic priapism, will consider possible add-on for penile shunt procedure  - Please page with any questions or concerns   - Will continue to follow     Discussed with attending: Dr. Gretchen Low MD  Urologic Surgery  PGY-3  Adult Urology: [id number]    Pediatric Urology: [id number]

## 2024-09-01 NOTE — PROGRESS NOTES
09/01/24 1324   Discharge Planning   Living Arrangements Parent   Support Systems Parent   Type of Residence Private residence   Number of Stairs to Enter Residence   (Ramp)   Who is requesting discharge planning? Provider   Home or Post Acute Services None   Expected Discharge Disposition Home   Does the patient need discharge transport arranged? No   RoundTrip coordination needed? No     Spoke with patient to introduce myself, role and discuss discharge planning.  Patient lives with mom who is the primary support person.  Patient is independent in all adl's.  Requires no assist devices for mobility.  Patient denies active home care or home care needs.  Denies any recent falls.  Patient feel safe at home and denies any issues with making follow up appointments or getting his medications.    PCP:  Dr. Barrera  Pharmacy:  [facility]  Home Care:  N/A  DME: N/A

## 2024-09-01 NOTE — CARE PLAN
The clinical goals for the shift include Patient will lose erection before end of shift.    Problem: Pain  Goal: Turns in bed with improved pain control throughout the shift  Outcome: Progressing  Goal: Walks with improved pain control throughout the shift  Outcome: Progressing  Goal: Performs ADL's with improved pain control throughout shift  Outcome: Progressing     Problem: Pain  Goal: Takes deep breaths with improved pain control throughout the shift  Outcome: Met  Goal: Free from opioid side effects throughout the shift  Outcome: Met  Goal: Free from acute confusion related to pain meds throughout the shift  Outcome: Met

## 2024-09-01 NOTE — PROGRESS NOTES
Joellen Narayan is a 23 y.o. 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    Subjective   Required multiple doses of sudaphed overnight for priapism. Urology saw patient this morning. Denies N/V/D/C, chest pain, cough, shortness of breath.  Plan for exchange today or tomorrow pending final recs from Heme. Starting PCA for further pain control.        Objective     Physical Exam  Constitutional:       General: He is not in acute distress.     Appearance: He is not toxic-appearing.   HENT:      Head: Normocephalic and atraumatic.   Eyes:      General: Scleral icterus present.      Extraocular Movements: Extraocular movements intact.   Cardiovascular:      Rate and Rhythm: Normal rate and regular rhythm.   Pulmonary:      Effort: No respiratory distress.   Abdominal:      General: Abdomen is flat.      Palpations: Abdomen is soft.      Tenderness: There is no abdominal tenderness.   Genitourinary:     Penis: Tenderness and swelling present.    Musculoskeletal:         General: No swelling or tenderness.   Skin:     Coloration: Skin is not jaundiced.      Findings: No bruising or erythema.         Last Recorded Vitals  Blood pressure 123/67, pulse 71, temperature 36.5 °C (97.7 °F), temperature source Temporal, resp. rate 20, SpO2 95%.  Intake/Output last 3 Shifts:  No intake/output data recorded.      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 Attempted drainage in ED with  Urology, patient unable to tolerate so was given ketamine. Priapism initially resolved without drainage upon ketamine infusion but returned a few hours later and he was taken to OR for drainage. Admitted for further pain control, started on dilaudid IVP. Spoke with Dr Cruz who agrees patient would benefit from RBCEx this admission, Heme consulted 9/1.       # Priapism  - Presented to the ED with priapism >14hrs  - Urology consulted and attempted drainage in ED, patient unable to tolerate so was given ketamine; Priapism initially resolved without drainage upon ketamine infusion but returned a few hours later and he was taken to OR for drainage  - s/p OR 8/31 with urology for priapism drainage, penile block, and corpora cavernosa irrigation  - Spoke with Dr Cruz who agrees patient would benefit from RBCEx this admission, initially planned for Monday when IR can place line   - Heme consulted 9/1 for RBCEx given persistent priapism, recs pending; exchange labs sent 9/1 AM   - Sudafed daily PRN priapism ordered, has required 3 doses in last 12 hrs (8/31-9/1)  - Pt missed urology FUV 7/2, new appt 10/7 (will try for sooner appt)     # Hgb SS with severe pain  - OARRS reviewed, no aberrancies  - No current care path  - Hgb baseline ~8.5, Hgb 8.3 (8/31)  - Tbili baseline fluctuates ~5-13, Tbili 7.2 (8/31)  - LDH baseline ~500,  (8/31)  - On admit started IV dilaudid 2mg q2hrs PRN severe pain (8/31-9/1)  - 9/1 started dPCA for pain control; 0.2mg demand q10min with 1 hr max of 1mg   - Continue folic acid 1mg daily  - Hgb S (8/31): pending  - PO Zofran PRN for opioid-induced nausea, PO Benadryl PRN for opioid-induced pruritus, Bowel regimen for opioid-induced constipation with DocuSenna 2tabs BID and Miralax daily      # Nodular lymphocyte predominant Hodgkins lymphoma (NLPHL)   - Primary Oncologist: Dr. Stoll  - Enlarged lymph nodes noted 4/1/22  - RUQ US (11/14/22) with mildly enlarged LNs in the region of the  kavin hepatis  - MRI liver (11/16/22) showed re-demonstration of bulky retroperitoneal lymphadenopathy and kavin hepatic lymphadenopathy    - (11/18/22) lymph node biopsy showed atypical lymphoid infiltrate. Reviewed by Hemepath board, insufficient for lymphoma diagnosis  - PET/CT (12/6/22) showing retroperitoneal lymphadenopathy  - Followed up with Dr. Stoll (12/16/22) with plan for surg/onc consult for large tissue bx but patient missed apt and was lost to follow up  - Requested new apt with Dr. Ronnie Marte 6/19/23, patient was not seen and lost to follow up  - CT a/p (11/28/23) increased size of retroperitoneal lymph nodes reflecting extramedullary hematopoiesis I/s/o sickle cell vs lymphoma  - Paraaortic LN bx via IR (11/30/23) consistent with NLPHL. Flow: no clonal B cell or T cell population identified, lymphocyte 95%, CD3+CD4+ 68%, CD3+CD8+ 7%, CD19+ 24%  - Elevated LDH/bili partially from sickle cell disease   - Chemotherapy (R-CHOP) was discussed with primary oncologist Dr. Stoll, and decision was made to simplify his chemotherapy to Rituxan and prednisone q3 weeks mainly due to frequent sickle cell crisis  - Current chemo regimen: rituxan and prednisone q3 weeks (C1 1/18/24, C2 2/8/24, Missed C3 d/t sickle cell pain crisis, C4 4/4/24, C5 5/16/24, C6 6/7/24)  - Per pt no longer taking Acyclovir 400mg BID prophy   - PET CT (7/25) overall showing great response to treatment with persistent residual viable disease involving a left common iliac node  - Last FUV with Dr. Stoll 7/25/24 rec RTC in 2 months (next FUV scheduled for 9/19)     # Choledocholithiasis  - s/p ERCP 7/18/24 with a biliary sphincterotomy where sludge and stones were removed, achieving complete clearance  - Seen by gen surg 8/8/24 Dr. Dove who rec lap cholecystectomy d/t the chance of recurrence of choledocholithiasis  - Surgery has yet to be scheduled  - Tbili 7.2 (8/31) which is markedly improved, recent peak at 52.8 prior to ERCP  "during recent hospital admission     # Hx of nocturnal oxygen dependence and hypoxia on room air  - Has not had home oxygen for 2-3 years   - Wean as able, encourage incentive spirometry  - Pulm FUV 8/8- \"Given his history of sickle cell disease, it is important to ensure he has formal sleep testing to look at desaturations and presence of sleep apnea despite not having a convincing history/body habitus typical for suspecting sleep apnea- patients with sickle cell have a higher prevalence of sleep disorders including nocturnal hypoxemia\"--> rec sleep referral for formal testing if deemed appropriate, ABG and O2 destat testing, and TTE with bubble study  - TTE 8/23 showing EF 60-65%, severely dilated LV and LA, + intrapulmonary shunting    - FU with Pulm outpatient (appt requested)        DVT prophy: Lovenox subcutaneous, SCDs, encourage ambulation     DISPO:  - Full code, confirmed on admit  - DC pending improvement in pain  - SUSIE Narayan (Parent) [phone]  - Sickle Cell 9/5, Dr. Stoll 9/19, Urology 10/7, Pulm requested/pending        I spent >60 minutes in the professional and overall care of this patient.      Mary Moody, APRN-CNP      "

## 2024-09-02 LAB
ALBUMIN SERPL BCP-MCNC: 4.5 G/DL (ref 3.4–5)
ALP SERPL-CCNC: 118 U/L (ref 33–120)
ALT SERPL W P-5'-P-CCNC: 26 U/L (ref 10–52)
ANION GAP SERPL CALC-SCNC: 14 MMOL/L (ref 10–20)
AST SERPL W P-5'-P-CCNC: 42 U/L (ref 9–39)
BASOPHILS # BLD AUTO: 0.03 X10*3/UL (ref 0–0.1)
BASOPHILS NFR BLD AUTO: 0.2 %
BILIRUB SERPL-MCNC: 8.8 MG/DL (ref 0–1.2)
BUN SERPL-MCNC: 5 MG/DL (ref 6–23)
CALCIUM SERPL-MCNC: 9.4 MG/DL (ref 8.6–10.6)
CHLORIDE SERPL-SCNC: 101 MMOL/L (ref 98–107)
CO2 SERPL-SCNC: 24 MMOL/L (ref 21–32)
CREAT SERPL-MCNC: 0.53 MG/DL (ref 0.5–1.3)
EGFRCR SERPLBLD CKD-EPI 2021: >90 ML/MIN/1.73M*2
EOSINOPHIL # BLD AUTO: 0.1 X10*3/UL (ref 0–0.7)
EOSINOPHIL NFR BLD AUTO: 0.7 %
ERYTHROCYTE [DISTWIDTH] IN BLOOD BY AUTOMATED COUNT: 21.3 % (ref 11.5–14.5)
GLUCOSE SERPL-MCNC: 84 MG/DL (ref 74–99)
HCT VFR BLD AUTO: 21.7 % (ref 41–52)
HGB BLD-MCNC: 8 G/DL (ref 13.5–17.5)
HGB RETIC QN: 30 PG (ref 28–38)
HOWELL-JOLLY BOD BLD QL SMEAR: PRESENT
IMM GRANULOCYTES # BLD AUTO: 0.16 X10*3/UL (ref 0–0.7)
IMM GRANULOCYTES NFR BLD AUTO: 1 % (ref 0–0.9)
IMMATURE RETIC FRACTION: 21.4 %
LDH SERPL L TO P-CCNC: 556 U/L (ref 84–246)
LYMPHOCYTES # BLD AUTO: 0.89 X10*3/UL (ref 1.2–4.8)
LYMPHOCYTES NFR BLD AUTO: 5.8 %
MCH RBC QN AUTO: 30 PG (ref 26–34)
MCHC RBC AUTO-ENTMCNC: 36.9 G/DL (ref 32–36)
MCV RBC AUTO: 81 FL (ref 80–100)
MONOCYTES # BLD AUTO: 1.82 X10*3/UL (ref 0.1–1)
MONOCYTES NFR BLD AUTO: 11.8 %
NEUTROPHILS # BLD AUTO: 12.37 X10*3/UL (ref 1.2–7.7)
NEUTROPHILS NFR BLD AUTO: 80.5 %
NRBC BLD-RTO: 1.5 /100 WBCS (ref 0–0)
OVALOCYTES BLD QL SMEAR: NORMAL
PLATELET # BLD AUTO: 435 X10*3/UL (ref 150–450)
POLYCHROMASIA BLD QL SMEAR: NORMAL
POTASSIUM SERPL-SCNC: 4.1 MMOL/L (ref 3.5–5.3)
PROT SERPL-MCNC: 6.8 G/DL (ref 6.4–8.2)
RBC # BLD AUTO: 2.67 X10*6/UL (ref 4.5–5.9)
RBC MORPH BLD: NORMAL
RETICS #: 0.67 X10*6/UL (ref 0.02–0.12)
RETICS/RBC NFR AUTO: 25.2 % (ref 0.5–2)
SICKLE CELLS BLD QL SMEAR: NORMAL
SODIUM SERPL-SCNC: 135 MMOL/L (ref 136–145)
TARGETS BLD QL SMEAR: NORMAL
WBC # BLD AUTO: 15.4 X10*3/UL (ref 4.4–11.3)

## 2024-09-02 PROCEDURE — 80053 COMPREHEN METABOLIC PANEL: CPT

## 2024-09-02 PROCEDURE — 2500000004 HC RX 250 GENERAL PHARMACY W/ HCPCS (ALT 636 FOR OP/ED): Performed by: PHYSICIAN ASSISTANT

## 2024-09-02 PROCEDURE — 36415 COLL VENOUS BLD VENIPUNCTURE: CPT

## 2024-09-02 PROCEDURE — 2500000002 HC RX 250 W HCPCS SELF ADMINISTERED DRUGS (ALT 637 FOR MEDICARE OP, ALT 636 FOR OP/ED)

## 2024-09-02 PROCEDURE — 83615 LACTATE (LD) (LDH) ENZYME: CPT

## 2024-09-02 PROCEDURE — 2500000005 HC RX 250 GENERAL PHARMACY W/O HCPCS

## 2024-09-02 PROCEDURE — 85025 COMPLETE CBC W/AUTO DIFF WBC: CPT

## 2024-09-02 PROCEDURE — 99233 SBSQ HOSP IP/OBS HIGH 50: CPT

## 2024-09-02 PROCEDURE — 1200000003 HC ONCOLOGY  ROOM WITH TELEMETRY DAILY

## 2024-09-02 PROCEDURE — 85045 AUTOMATED RETICULOCYTE COUNT: CPT

## 2024-09-02 PROCEDURE — 2500000004 HC RX 250 GENERAL PHARMACY W/ HCPCS (ALT 636 FOR OP/ED)

## 2024-09-02 PROCEDURE — 2500000001 HC RX 250 WO HCPCS SELF ADMINISTERED DRUGS (ALT 637 FOR MEDICARE OP)

## 2024-09-02 RX ORDER — NALOXONE HYDROCHLORIDE 0.4 MG/ML
0.2 INJECTION, SOLUTION INTRAMUSCULAR; INTRAVENOUS; SUBCUTANEOUS AS NEEDED
Status: DISCONTINUED | OUTPATIENT
Start: 2024-09-02 | End: 2024-09-09 | Stop reason: HOSPADM

## 2024-09-02 RX ORDER — MORPHINE SULFATE IN 0.9 % NACL 30 MG/30ML
PATIENT CONTROLLED ANALGESIA SYRINGE INTRAVENOUS CONTINUOUS
Status: DISCONTINUED | OUTPATIENT
Start: 2024-09-02 | End: 2024-09-03

## 2024-09-02 RX ADMIN — Medication: at 08:04

## 2024-09-02 RX ADMIN — PSEUDOEPHEDRINE HYDROCHLORIDE 60 MG: 60 TABLET ORAL at 02:05

## 2024-09-02 RX ADMIN — GABAPENTIN 100 MG: 100 CAPSULE ORAL at 22:14

## 2024-09-02 RX ADMIN — PANTOPRAZOLE SODIUM 20 MG: 20 TABLET, DELAYED RELEASE ORAL at 04:59

## 2024-09-02 RX ADMIN — Medication 2 L/MIN: at 08:17

## 2024-09-02 RX ADMIN — PSEUDOEPHEDRINE HYDROCHLORIDE 60 MG: 60 TABLET ORAL at 09:57

## 2024-09-02 RX ADMIN — Medication: at 17:25

## 2024-09-02 RX ADMIN — BACLOFEN 5 MG: 10 TABLET ORAL at 08:03

## 2024-09-02 RX ADMIN — BACLOFEN 5 MG: 10 TABLET ORAL at 20:57

## 2024-09-02 RX ADMIN — SODIUM CHLORIDE, POTASSIUM CHLORIDE, SODIUM LACTATE AND CALCIUM CHLORIDE 75 ML/HR: 600; 310; 30; 20 INJECTION, SOLUTION INTRAVENOUS at 02:06

## 2024-09-02 RX ADMIN — GABAPENTIN 100 MG: 100 CAPSULE ORAL at 13:14

## 2024-09-02 RX ADMIN — Medication: at 13:23

## 2024-09-02 RX ADMIN — Medication 2 L/MIN: at 20:08

## 2024-09-02 RX ADMIN — GABAPENTIN 100 MG: 100 CAPSULE ORAL at 05:00

## 2024-09-02 RX ADMIN — IBUPROFEN 200 MG: 400 TABLET ORAL at 08:13

## 2024-09-02 RX ADMIN — BACLOFEN 5 MG: 10 TABLET ORAL at 14:38

## 2024-09-02 RX ADMIN — PSEUDOEPHEDRINE HYDROCHLORIDE 60 MG: 60 TABLET ORAL at 17:22

## 2024-09-02 RX ADMIN — FOLIC ACID 1 MG: 1 TABLET ORAL at 08:03

## 2024-09-02 ASSESSMENT — PAIN SCALES - WONG BAKER: WONGBAKER_NUMERICALRESPONSE: NO HURT

## 2024-09-02 ASSESSMENT — PAIN - FUNCTIONAL ASSESSMENT
PAIN_FUNCTIONAL_ASSESSMENT: 0-10
PAIN_FUNCTIONAL_ASSESSMENT: 0-10

## 2024-09-02 ASSESSMENT — PAIN SCALES - GENERAL
PAINLEVEL_OUTOF10: 9
PAINLEVEL_OUTOF10: 4
PAINLEVEL_OUTOF10: 8

## 2024-09-02 NOTE — PROGRESS NOTES
Joellen Narayan is a 23 y.o. 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    Subjective   Seen at bedside, had phenylephrine injection overnight for priapism with some resolution. Still having pain, doesn't think dPCA is working and curious about morphine. Denies N/V/D/C, chest pain, cough, shortness of breath.  Currently NPO, otherwise doesn't have much of an appetite. Further Urology plan pending.        Objective     Physical Exam  Constitutional:       General: He is not in acute distress.     Appearance: He is not toxic-appearing.   HENT:      Head: Normocephalic and atraumatic.   Eyes:      General: No scleral icterus.     Extraocular Movements: Extraocular movements intact.   Cardiovascular:      Rate and Rhythm: Normal rate and regular rhythm.   Pulmonary:      Effort: No respiratory distress.   Abdominal:      General: Abdomen is flat.      Palpations: Abdomen is soft.      Tenderness: There is no abdominal tenderness.   Genitourinary:     Penis: Tenderness present.    Musculoskeletal:         General: No swelling or tenderness.   Skin:     Coloration: Skin is not jaundiced.      Findings: No bruising or erythema.   Neurological:      Mental Status: He is oriented to person, place, and time. Mental status is at baseline.         Last Recorded Vitals  Blood pressure 96/61, pulse 105, temperature 36.2 °C (97.2 °F), temperature source Temporal, resp. rate 18, SpO2 92%.  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 Attempted drainage in ED with Urology, patient unable to tolerate so was given ketamine. Priapism initially resolved without drainage upon ketamine infusion but returned a few hours later and he was taken to OR for drainage. Admitted for further pain control, started on dilaudid IVP. Priapism continued on 9/1, requiring multiple doses of sudafed without resolution. Heme consulted 9/1 for RBCEx given persistent priapism, rec no RBCEx at this time, schedule sudafed q8h and added ibuprofen, baclofen 5mg TID and gabapentin 100mg TID. Penile doppler US (9/1) showing small caliber of bilateral cavernosal arteries with lack of color flow in portions of both arteries, more on the right, c/w low-flow/ischemic priapism. Urology notified, s/p phenylephrine injection with urology 9/1 evening with partial resolution of priapism. Further plan pending, (potential penile shunt if priapism returns), pt to remain NPO pending monitoring.      # Priapism  - frequent episodes within the last month, has had 3 ED visitis with same complaint  - Pt missed urology FUV 7/2, new appt 10/7 (will try for sooner appt)  - 8/31 Presented to the ED with priapism >14hrs  - Urology consulted and attempted drainage in ED, patient unable to tolerate so was given ketamine; Priapism initially resolved without drainage upon ketamine infusion but returned a few hours later and he was taken to OR for drainage  - s/p OR 8/31 with urology for priapism drainage, penile block, and corpora cavernosa irrigation  - priapism continued on 9/1, requiring multiple doses of sudafed without resolution  - s/p dPCA for pain control; initially 0.2mg demand q10min with 1 hr max of 1mg and then later increased to 0.4mg demand q6min with 1hr max of 2.5mg   - changed to morphine PCA 9/2 as dilaudid did not seem to be helping  pain; 2mg demand dose q10 min with 1 hr max of 12mg (9/2--current)  - Heme consulted 9/1 for RBCEx given persistent priapism, rec no RBCEx at this time, schedule sudafed q8h and add ibuprofen  - urology continuing to follow, requests penile doppler US given priapism continuing   - added baclofen 5mg TID and gabapentin 100mg TID; per urology, some small studies that suggest potential role in reducing recurrent priapism   - penile doppler US (9/1) showing small caliber of bilateral cavernosal arteries with lack of color flow in portions of both arteries, more on the right, c/w low-flow/ischemic priapism   - s/p phenylephrine injection with urology 9/1 evening with partial resolution of priapism  - 9/2 keep patient NPO pending further urology plan (potential penile shunt if priapism returns)     # Hgb SS with severe pain  - OARRS reviewed, no aberrancies  - No current care path  - Hgb baseline ~8.5, Hgb 8.3 (8/31)  - Tbili baseline fluctuates ~5-13, Tbili 7.2 (8/31)  - LDH baseline ~500,  (8/31)  - On admit started IV dilaudid 2mg q2hrs PRN severe pain (8/31-9/1)  - 9/1 started dPCA for pain control; see above   - Continue folic acid 1mg daily  - Hgb S (8/31): pending  - PO Zofran PRN for opioid-induced nausea, PO Benadryl PRN for opioid-induced pruritus, Bowel regimen for opioid-induced constipation with DocuSenna 2tabs BID and Miralax daily      # Nodular lymphocyte predominant Hodgkins lymphoma (NLPHL)   - Primary Oncologist: Dr. Stoll  - Enlarged lymph nodes noted 4/1/22  - RUQ US (11/14/22) with mildly enlarged LNs in the region of the kavin hepatis  - MRI liver (11/16/22) showed re-demonstration of bulky retroperitoneal lymphadenopathy and kavin hepatic lymphadenopathy    - (11/18/22) lymph node biopsy showed atypical lymphoid infiltrate. Reviewed by Hemepath board, insufficient for lymphoma diagnosis  - PET/CT (12/6/22) showing retroperitoneal lymphadenopathy  - Followed up with Dr. Stoll (12/16/22)  "with plan for surg/onc consult for large tissue bx but patient missed apt and was lost to follow up  - Requested new apt with Dr. Ronnie Marte 6/19/23, patient was not seen and lost to follow up  - CT a/p (11/28/23) increased size of retroperitoneal lymph nodes reflecting extramedullary hematopoiesis I/s/o sickle cell vs lymphoma  - Paraaortic LN bx via IR (11/30/23) consistent with NLPHL. Flow: no clonal B cell or T cell population identified, lymphocyte 95%, CD3+CD4+ 68%, CD3+CD8+ 7%, CD19+ 24%  - Elevated LDH/bili partially from sickle cell disease   - Chemotherapy (R-CHOP) was discussed with primary oncologist Dr. Stoll, and decision was made to simplify his chemotherapy to Rituxan and prednisone q3 weeks mainly due to frequent sickle cell crisis  - Current chemo regimen: rituxan and prednisone q3 weeks (C1 1/18/24, C2 2/8/24, Missed C3 d/t sickle cell pain crisis, C4 4/4/24, C5 5/16/24, C6 6/7/24)  - Per pt no longer taking Acyclovir 400mg BID prophy   - PET CT (7/25) overall showing great response to treatment with persistent residual viable disease involving a left common iliac node  - Last FUV with Dr. Stoll 7/25/24 rec RTC in 2 months (next FUV scheduled for 9/19)     # Choledocholithiasis  - s/p ERCP 7/18/24 with a biliary sphincterotomy where sludge and stones were removed, achieving complete clearance  - Seen by gen surg 8/8/24 Dr. Dove who rec lap cholecystectomy d/t the chance of recurrence of choledocholithiasis  - Surgery has yet to be scheduled  - Tbili 7.2 (8/31) which is markedly improved, recent peak at 52.8 prior to ERCP during recent hospital admission     # Hx of nocturnal oxygen dependence and hypoxia on room air  - Has not had home oxygen for 2-3 years   - Wean as able, encourage incentive spirometry  - Pulm FUV 8/8- \"Given his history of sickle cell disease, it is important to ensure he has formal sleep testing to look at desaturations and presence of sleep apnea despite not having a " "convincing history/body habitus typical for suspecting sleep apnea- patients with sickle cell have a higher prevalence of sleep disorders including nocturnal hypoxemia\"--> rec sleep referral for formal testing if deemed appropriate, ABG and O2 destat testing, and TTE with bubble study  - TTE 8/23 showing EF 60-65%, severely dilated LV and LA, + intrapulmonary shunting    - FU with Pulm outpatient (appt requested)     DVT prophy: Lovenox subcutaneous, SCDs, encourage ambulation     DISPO:  - Full code, confirmed on admit  - DC pending improvement in pain  - SUSIE Narayan (Parent) [phone]  - Sickle Cell 9/5, Dr. Stoll 9/19, Urology 10/7, Pulm requested/pending        I spent >60 minutes in the professional and overall care of this patient.      Mary Moody, APRN-CNP      "

## 2024-09-02 NOTE — CARE PLAN
The patient's goals for the shift include pain control    The clinical goals for the shift include patient will remain safe    Over the shift, the patient did not make progress toward the following goals. Barriers to progression include pain. Recommendations to address these barriers include call light within reach, check on patient frequently, personal items within reach.    Problem: Pain  Goal: Turns in bed with improved pain control throughout the shift  Outcome: Progressing  Goal: Walks with improved pain control throughout the shift  Outcome: Progressing  Goal: Performs ADL's with improved pain control throughout shift  Outcome: Progressing  Goal: Participates in PT with improved pain control throughout the shift  Outcome: Progressing

## 2024-09-02 NOTE — CARE PLAN
Urology Care Plan Update    Joellen Narayan is a 23 year old male with sickle cell disease complicated by recurrent priapism. He underwent priapism drainage in the OR yesterday. He has recurrence of ischemic priapism today confirmed on doppler ultrasound. Phenlyelphrine injection (500mcg) into the cavernosa was discussed with the patient and his mother and will be performed at bedside. If this is unsuccessful, he may require operative intervention.    - maintain NPO  - continue IV fluids, supplemental O2  - Urology will dilute and administer phenylephrine into the cavernosa for a total of 500mcg in one half ml  - If detumescence does not occur following this, patient may require operative intervention for repeat drainage vs shunt      Addendum  Patient achieved partial detumescence with 500mcg phenylephrine injection. Discussed with patient and mother that they should alert us if there is worsening of the priapism as further medication administration may be necessary. Please keep patient NPO until assessment on AM rounds.      Discussed with attending Dr. Gretchen Vega MD  PGY-2 Urology [place]  Adult Urology Pager: [phone]  Pediatric Urology Pager: [phone]

## 2024-09-02 NOTE — CARE PLAN
The patient's goals for the shift include pain control    The clinical goals for the shift include Patient will lose erection before end of shift.

## 2024-09-02 NOTE — PROGRESS NOTES
[facility] [place] [facility]  Daily Progress Note    Interval History/Overnight Events:   - NADARRON  - Partial detumescence overnight after 500mcg  - Has been able to void spontaneously    Medications:    Current Facility-Administered Medications   Medication Dose Route Frequency Provider Last Rate Last Admin    baclofen (Lioresal) tablet 5 mg  5 mg oral TID Mary Moody APRN-CNP   5 mg at 09/02/24 0803    diphenhydrAMINE (BENADryl) capsule 25 mg  25 mg oral q6h PRN Mary Moody, APRN-CNP        docusate sodium (Colace) capsule 100 mg  100 mg oral BID PRN Mary Moody, APRN-CNP        Or    polyethylene glycol (Glycolax, Miralax) packet 17 g  17 g oral BID PRN Mary Moody, APRN-CNP        enoxaparin (Lovenox) syringe 40 mg  40 mg subcutaneous q24h Mary Moody, APRN-CNP   40 mg at 08/31/24 2058    folic acid (Folvite) tablet 1 mg  1 mg oral Daily Mary Moody APRN-CNP   1 mg at 09/02/24 0803    gabapentin (Neurontin) capsule 100 mg  100 mg oral q8h REYNALDO Mary Moody, APRN-CNP   100 mg at 09/02/24 0500    hydromorphone PCA 0.5 mg/mL in NS opioid tolerant   intravenous Continuous Sachin Renyolds PA-C   New Syringe/Cartridge at 09/02/24 0804    ibuprofen tablet 200 mg  200 mg oral q8h PRN Mary REMI Moody, APRN-CNP   200 mg at 09/02/24 0813    lactated Ringer's infusion  75 mL/hr intravenous Continuous Mary Moody APRN-CNP 75 mL/hr at 09/02/24 0458 75 mL/hr at 09/02/24 0458    naloxone (Narcan) injection 0.2 mg  0.2 mg intravenous q5 min PRN Mary Moody APRN-CNP        naloxone (Narcan) injection 0.2 mg  0.2 mg intravenous PRN Mary REMI Moody, APRN-CNP        ondansetron ODT (Zofran-ODT) disintegrating tablet 8 mg  8 mg oral q8h PRN Mary Moody, APRN-CNP        oxygen (O2) therapy   inhalation Continuous - Inhalation RAVI Velazco   2 L/min at 09/02/24 0817    pantoprazole (ProtoNix) EC tablet 20 mg  20 mg oral Daily before breakfast RAVI Velazco   20 mg at 09/02/24 0459    pseudoephedrine (Sudafed) tablet 60  mg  60 mg oral q8h Mary KHALIL RAVI Moody   60 mg at 09/02/24 0205       Objective    Vitals:    09/01/24 1916 09/01/24 2147 09/01/24 2246 09/02/24 0222   BP: 145/75  129/73 136/75   BP Location: Left arm  Left arm Left arm   Patient Position: Standing  Lying Sitting   Pulse: 86  68 92   Resp: 18  18 18   Temp: 36.9 °C (98.4 °F)  36.4 °C (97.5 °F) 36.5 °C (97.7 °F)   TempSrc: Temporal  Temporal Temporal   SpO2:  95% 96% 97%     08/31 1900 - 09/02 0659  In: 1153.8 [P.O.:400; I.V.:753.8]  Out: -     Physical Exam       General: resting comfortably in bed  Neuro: alert and oriented, no obvious focal deficit   Head/Neck: normocephalic, atraumatic  ENT: moist mucous membranes  CV: regular rate and rhythm  Pulm: nonlabored breathing on room air, no conversational dyspnea  Abd: soft, nondistended, nontender  : penis, firm but still slightly bendable  MSK: RAMIREZ, no gross deformities  Psych: mood and behavior normal      Labs    Lab Results   Component Value Date    WBC 15.4 (H) 09/02/2024    HGB 8.0 (L) 09/02/2024    HCT 21.7 (L) 09/02/2024    MCV 81 09/02/2024     09/02/2024      Lab Results   Component Value Date    GLUCOSE 84 09/02/2024    CALCIUM 9.4 09/02/2024     (L) 09/02/2024    K 4.1 09/02/2024    CO2 24 09/02/2024     09/02/2024    BUN 5 (L) 09/02/2024    CREATININE 0.53 09/02/2024        Imaging  Doppler US  Doppler evaluation:  There is a small caliber of bilateral cavernosal arteries.  Flow is noted within the deep and superficial dorsal veins.  Flow is noted within the dorsal arteries bilaterally.  The resistive indices in the right and left cavernosal arteries are  0.76 and 0.9 respectively. The velocities within the right and left  cavernosal arteries are 8.3 cm/sec and 9.1 cm/sec respectively.      IMPRESSION:  *Small caliber of bilateral cavernosal arteries with lack of color  flow in portions of both arteries, more on the right. Additionally  there is markedly low peak systolic  velocities and increased  resistive indices on Doppler interrogation of the visualized  portions. Findings in the setting of sickle cell disease are  compatible with low-flow/ischemic priapism. 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 He underwent priapism drainage in the OR 8/31. He has recurrence of ischemic priapism yesterday confirmed on doppler ultrasound. Yesterday, phenlyelphrine injection (500mcg) was administered into the cavernosa at bedside. Today, he has partial detumescence. His penis is firm but slightly bendable. Will keep NPO in case he requires OR for drainage.      - maintain NPO  - continue IV fluids, supplemental O2  - If erection persists, urology will dilute and administer phenylephrine into the cavernosa for a total of 500mcg in one half ml  - If detumescence does not occur following this, patient may require operative intervention for repeat drainage vs shunt    Seen with chief resident Dr. Morelos and discussed with attending Dr. Gretchen Bishop MD  PGY-1 Urology [place]  Adult Urology Pager: [phone]  Pediatric Urology Pager: [phone]

## 2024-09-03 LAB
ALBUMIN SERPL BCP-MCNC: 4.5 G/DL (ref 3.4–5)
ALP SERPL-CCNC: 124 U/L (ref 33–120)
ALT SERPL W P-5'-P-CCNC: 24 U/L (ref 10–52)
ANION GAP SERPL CALC-SCNC: 13 MMOL/L (ref 10–20)
AST SERPL W P-5'-P-CCNC: 43 U/L (ref 9–39)
BASOPHILS # BLD AUTO: 0.04 X10*3/UL (ref 0–0.1)
BASOPHILS NFR BLD AUTO: 0.2 %
BILIRUB SERPL-MCNC: 12.2 MG/DL (ref 0–1.2)
BUN SERPL-MCNC: 7 MG/DL (ref 6–23)
CALCIUM SERPL-MCNC: 9.6 MG/DL (ref 8.6–10.6)
CHLORIDE SERPL-SCNC: 102 MMOL/L (ref 98–107)
CO2 SERPL-SCNC: 26 MMOL/L (ref 21–32)
CREAT SERPL-MCNC: 0.62 MG/DL (ref 0.5–1.3)
EGFRCR SERPLBLD CKD-EPI 2021: >90 ML/MIN/1.73M*2
EOSINOPHIL # BLD AUTO: 0.1 X10*3/UL (ref 0–0.7)
EOSINOPHIL NFR BLD AUTO: 0.5 %
ERYTHROCYTE [DISTWIDTH] IN BLOOD BY AUTOMATED COUNT: 21.6 % (ref 11.5–14.5)
GLUCOSE SERPL-MCNC: 58 MG/DL (ref 74–99)
HCT VFR BLD AUTO: 21.5 % (ref 41–52)
HGB BLD-MCNC: 7.7 G/DL (ref 13.5–17.5)
HGB RETIC QN: 28 PG (ref 28–38)
IMM GRANULOCYTES # BLD AUTO: 0.18 X10*3/UL (ref 0–0.7)
IMM GRANULOCYTES NFR BLD AUTO: 1 % (ref 0–0.9)
IMMATURE RETIC FRACTION: 20.2 %
LDH SERPL L TO P-CCNC: 600 U/L (ref 84–246)
LYMPHOCYTES # BLD AUTO: 2.1 X10*3/UL (ref 1.2–4.8)
LYMPHOCYTES NFR BLD AUTO: 11.5 %
MCH RBC QN AUTO: 30 PG (ref 26–34)
MCHC RBC AUTO-ENTMCNC: 35.8 G/DL (ref 32–36)
MCV RBC AUTO: 84 FL (ref 80–100)
MONOCYTES # BLD AUTO: 2.27 X10*3/UL (ref 0.1–1)
MONOCYTES NFR BLD AUTO: 12.4 %
NEUTROPHILS # BLD AUTO: 13.56 X10*3/UL (ref 1.2–7.7)
NEUTROPHILS NFR BLD AUTO: 74.4 %
NRBC BLD-RTO: 0.6 /100 WBCS (ref 0–0)
PAPPENHEIMER BOD BLD QL SMEAR: PRESENT
PLATELET # BLD AUTO: 386 X10*3/UL (ref 150–450)
POLYCHROMASIA BLD QL SMEAR: NORMAL
POTASSIUM SERPL-SCNC: 3.8 MMOL/L (ref 3.5–5.3)
PROT SERPL-MCNC: 6.7 G/DL (ref 6.4–8.2)
RBC # BLD AUTO: 2.57 X10*6/UL (ref 4.5–5.9)
RBC MORPH BLD: NORMAL
RETICS #: 0.58 X10*6/UL (ref 0.02–0.12)
RETICS/RBC NFR AUTO: 22.8 % (ref 0.5–2)
SICKLE CELLS BLD QL SMEAR: NORMAL
SODIUM SERPL-SCNC: 137 MMOL/L (ref 136–145)
TARGETS BLD QL SMEAR: NORMAL
WBC # BLD AUTO: 18.3 X10*3/UL (ref 4.4–11.3)

## 2024-09-03 PROCEDURE — 2500000005 HC RX 250 GENERAL PHARMACY W/O HCPCS

## 2024-09-03 PROCEDURE — 80053 COMPREHEN METABOLIC PANEL: CPT

## 2024-09-03 PROCEDURE — 1200000003 HC ONCOLOGY  ROOM WITH TELEMETRY DAILY

## 2024-09-03 PROCEDURE — 85045 AUTOMATED RETICULOCYTE COUNT: CPT

## 2024-09-03 PROCEDURE — 36415 COLL VENOUS BLD VENIPUNCTURE: CPT

## 2024-09-03 PROCEDURE — 2500000002 HC RX 250 W HCPCS SELF ADMINISTERED DRUGS (ALT 637 FOR MEDICARE OP, ALT 636 FOR OP/ED)

## 2024-09-03 PROCEDURE — 2500000004 HC RX 250 GENERAL PHARMACY W/ HCPCS (ALT 636 FOR OP/ED)

## 2024-09-03 PROCEDURE — 2500000001 HC RX 250 WO HCPCS SELF ADMINISTERED DRUGS (ALT 637 FOR MEDICARE OP)

## 2024-09-03 PROCEDURE — 85025 COMPLETE CBC W/AUTO DIFF WBC: CPT

## 2024-09-03 PROCEDURE — 99233 SBSQ HOSP IP/OBS HIGH 50: CPT

## 2024-09-03 PROCEDURE — 83615 LACTATE (LD) (LDH) ENZYME: CPT

## 2024-09-03 RX ORDER — MORPHINE SULFATE 4 MG/ML
5 INJECTION INTRAVENOUS EVERY 2 HOUR PRN
Status: DISCONTINUED | OUTPATIENT
Start: 2024-09-03 | End: 2024-09-04

## 2024-09-03 RX ADMIN — Medication 2 L/MIN: at 20:22

## 2024-09-03 RX ADMIN — PSEUDOEPHEDRINE HYDROCHLORIDE 60 MG: 60 TABLET ORAL at 11:04

## 2024-09-03 RX ADMIN — MORPHINE SULFATE 5 MG: 4 INJECTION, SOLUTION INTRAMUSCULAR; INTRAVENOUS at 17:36

## 2024-09-03 RX ADMIN — GABAPENTIN 100 MG: 100 CAPSULE ORAL at 22:00

## 2024-09-03 RX ADMIN — MORPHINE SULFATE 5 MG: 4 INJECTION, SOLUTION INTRAMUSCULAR; INTRAVENOUS at 15:33

## 2024-09-03 RX ADMIN — MORPHINE SULFATE 5 MG: 4 INJECTION, SOLUTION INTRAMUSCULAR; INTRAVENOUS at 20:17

## 2024-09-03 RX ADMIN — BACLOFEN 5 MG: 10 TABLET ORAL at 08:16

## 2024-09-03 RX ADMIN — PSEUDOEPHEDRINE HYDROCHLORIDE 60 MG: 60 TABLET ORAL at 17:36

## 2024-09-03 RX ADMIN — BACLOFEN 5 MG: 10 TABLET ORAL at 14:40

## 2024-09-03 RX ADMIN — BACLOFEN 5 MG: 10 TABLET ORAL at 20:18

## 2024-09-03 RX ADMIN — Medication 2 L/MIN: at 08:20

## 2024-09-03 RX ADMIN — IBUPROFEN 200 MG: 400 TABLET ORAL at 03:14

## 2024-09-03 RX ADMIN — GABAPENTIN 100 MG: 100 CAPSULE ORAL at 05:08

## 2024-09-03 RX ADMIN — PANTOPRAZOLE SODIUM 20 MG: 20 TABLET, DELAYED RELEASE ORAL at 06:10

## 2024-09-03 RX ADMIN — GABAPENTIN 100 MG: 100 CAPSULE ORAL at 14:40

## 2024-09-03 RX ADMIN — Medication: at 06:05

## 2024-09-03 RX ADMIN — MORPHINE SULFATE 5 MG: 4 INJECTION, SOLUTION INTRAMUSCULAR; INTRAVENOUS at 12:31

## 2024-09-03 RX ADMIN — PSEUDOEPHEDRINE HYDROCHLORIDE 60 MG: 60 TABLET ORAL at 01:38

## 2024-09-03 RX ADMIN — FOLIC ACID 1 MG: 1 TABLET ORAL at 08:16

## 2024-09-03 RX ADMIN — MORPHINE SULFATE 5 MG: 4 INJECTION, SOLUTION INTRAMUSCULAR; INTRAVENOUS at 23:19

## 2024-09-03 ASSESSMENT — COGNITIVE AND FUNCTIONAL STATUS - GENERAL
DAILY ACTIVITIY SCORE: 23
DAILY ACTIVITIY SCORE: 23
MOBILITY SCORE: 24
DRESSING REGULAR UPPER BODY CLOTHING: A LITTLE
DRESSING REGULAR UPPER BODY CLOTHING: A LITTLE

## 2024-09-03 ASSESSMENT — PAIN DESCRIPTION - LOCATION
LOCATION: GROIN
LOCATION: HEAD
LOCATION: GROIN
LOCATION: GROIN

## 2024-09-03 ASSESSMENT — PAIN SCALES - GENERAL
PAINLEVEL_OUTOF10: 8
PAINLEVEL_OUTOF10: 9
PAINLEVEL_OUTOF10: 2
PAINLEVEL_OUTOF10: 9
PAINLEVEL_OUTOF10: 7
PAINLEVEL_OUTOF10: 8

## 2024-09-03 ASSESSMENT — PAIN - FUNCTIONAL ASSESSMENT
PAIN_FUNCTIONAL_ASSESSMENT: 0-10

## 2024-09-03 ASSESSMENT — PAIN DESCRIPTION - ORIENTATION: ORIENTATION: MID

## 2024-09-03 NOTE — PROGRESS NOTES
09/03/24 1100   Discharge Planning   Who is requesting discharge planning? Provider   Home or Post Acute Services None   Expected Discharge Disposition Home   Does the patient need discharge transport arranged? No     9/3/24 @ 1155  Patient underwent priapism drainage in the OR 8/31. Anticipate no needs at discharge. Per team, plan to wean off PCA and advance diet. Urology following. Will continue to follow for any additional discharge needs.  Mari Ko RN [facility]

## 2024-09-03 NOTE — CARE PLAN
The patient's goals for the shift include pain control      Problem: Pain  Goal: Turns in bed with improved pain control throughout the shift  Outcome: Progressing  Goal: Walks with improved pain control throughout the shift  Outcome: Progressing  Goal: Performs ADL's with improved pain control throughout shift  Outcome: Progressing  Goal: Participates in PT with improved pain control throughout the shift  Outcome: Progressing

## 2024-09-03 NOTE — PROGRESS NOTES
[facility]  Daily Progress Note    Interval History/Overnight Events:   - MARIA MON  - Has not had episodes of priapism overnight   - Pain better than yesterday, currently 7/10.  - Has been able to void spontaneously  - Phallus slightly bendable     Medications:    Current Facility-Administered Medications   Medication Dose Route Frequency Provider Last Rate Last Admin    baclofen (Lioresal) tablet 5 mg  5 mg oral TID Mary REMI Moody, APRN-CNP   5 mg at 09/02/24 2057    diphenhydrAMINE (BENADryl) capsule 25 mg  25 mg oral q6h PRN Mary A Moody, APRN-CNP        docusate sodium (Colace) capsule 100 mg  100 mg oral BID PRN Mary A Fransisco, APRN-CNP        Or    polyethylene glycol (Glycolax, Miralax) packet 17 g  17 g oral BID PRN Mary A Fransisco, APRN-CNP        enoxaparin (Lovenox) syringe 40 mg  40 mg subcutaneous q24h Mary A Fransisco, APRN-CNP   40 mg at 08/31/24 2058    folic acid (Folvite) tablet 1 mg  1 mg oral Daily Mary A Fransisco, APRN-CNP   1 mg at 09/02/24 0803    gabapentin (Neurontin) capsule 100 mg  100 mg oral q8h REYNALDO Mary A Fransisco, APRN-CNP   100 mg at 09/03/24 0508    ibuprofen tablet 200 mg  200 mg oral q8h PRN Mary A Moody, APRN-CNP   200 mg at 09/03/24 0314    lactated Ringer's infusion  75 mL/hr intravenous Continuous Mary A Fransisco, APRN-CNP 75 mL/hr at 09/02/24 0458 75 mL/hr at 09/02/24 0458    morphine PCA 1 mg/mL in NS opioid tolerant   intravenous Continuous Mary A Fransisco, APRN-CNP   New Syringe/Cartridge at 09/03/24 0605    naloxone (Narcan) injection 0.2 mg  0.2 mg intravenous q5 min PRN Mary A Farnsisco, APRN-CNP        naloxone (Narcan) injection 0.2 mg  0.2 mg intravenous PRN Mary A Fransisco, APRN-CNP        naloxone (Narcan) injection 0.2 mg  0.2 mg intravenous PRN Mary A Fransisco, APRN-CNP        ondansetron ODT (Zofran-ODT) disintegrating tablet 8 mg  8 mg oral q8h PRN RAVI Velazco        oxygen (O2) therapy   inhalation Continuous - Inhalation RAVI Velazco   2 L/min at 09/02/24 2008     pantoprazole (ProtoNix) EC tablet 20 mg  20 mg oral Daily before breakfast YOHANNES VelazcoCNP   20 mg at 09/03/24 0610    pseudoephedrine (Sudafed) tablet 60 mg  60 mg oral q8h Mary Moody APRSANDIPKenzieCNP   60 mg at 09/03/24 0138       Objective    Vitals:    09/02/24 1710 09/02/24 2056 09/03/24 0138 09/03/24 0509   BP: 139/78 120/70 133/76 121/67   BP Location: Left arm      Patient Position: Sitting      Pulse: 98 110 102 99   Resp: 18 18 18 18   Temp: 37.5 °C (99.5 °F) 37.5 °C (99.5 °F) 37.2 °C (99 °F) 36.7 °C (98.1 °F)   TempSrc: Temporal Temporal Temporal Temporal   SpO2: 92% 92% 97% 92%     09/01 1900 - 09/03 0659  In: 753.8 [I.V.:753.8]  Out: 2900 [Urine:2900]    Physical Exam       General: resting comfortably in bed  Neuro: alert and oriented, no obvious focal deficit   Head/Neck: normocephalic, atraumatic  ENT: moist mucous membranes  CV: Intermittently tachycardic, currently regular rate  Pulm: nonlabored breathing on room air, no conversational dyspnea  Abd: soft, nondistended, nontender  : penis, firm but still slightly bendable  MSK: RAMIREZ, no gross deformities  Psych: mood and behavior normal      Labs    Lab Results   Component Value Date    WBC 15.4 (H) 09/02/2024    HGB 8.0 (L) 09/02/2024    HCT 21.7 (L) 09/02/2024    MCV 81 09/02/2024     09/02/2024      Lab Results   Component Value Date    GLUCOSE 84 09/02/2024    CALCIUM 9.4 09/02/2024     (L) 09/02/2024    K 4.1 09/02/2024    CO2 24 09/02/2024     09/02/2024    BUN 5 (L) 09/02/2024    CREATININE 0.53 09/02/2024        Imaging  Doppler US  Doppler evaluation:  There is a small caliber of bilateral cavernosal arteries.  Flow is noted within the deep and superficial dorsal veins.  Flow is noted within the dorsal arteries bilaterally.  The resistive indices in the right and left cavernosal arteries are  0.76 and 0.9 respectively. The velocities within the right and left  cavernosal arteries are 8.3 cm/sec and 9.1 cm/sec  respectively.      IMPRESSION:  *Small caliber of bilateral cavernosal arteries with lack of color  flow in portions of both arteries, more on the right. Additionally  there is markedly low peak systolic velocities and increased  resistive indices on Doppler interrogation of the visualized  portions. Findings in the setting of sickle cell disease are  compatible with low-flow/ischemic priapism. 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 He underwent priapism drainage in the OR 8/31. He has recurrence of ischemic priapism yesterday confirmed on doppler ultrasound. 9/1 phenlyelphrine injection (500mcg) was administered into the cavernosa at bedside. 9/2, he had partial detumescence. His penis is firm but slightly bendable.      - Recommend to advance diet   - Recommended to wean to off PCA, if primary can have suitable pain regimen, can be discharged from a urology point of view.   - Remainder of care per primary   - Urology will attempt to move outpatient appointment to an earlier date.   - Urology will continue to follow, please page/message for any questions or concerns.     Seen with chief resident Dr. Morelos and discussed with attending Dr. Gretchen Kenney MD, [facility] [place]  Adult Urology Pager: [phone]  Pediatric Urology Pager: [phone]

## 2024-09-03 NOTE — CARE PLAN
Problem: Pain  Goal: Turns in bed with improved pain control throughout the shift  Outcome: Progressing  Goal: Walks with improved pain control throughout the shift  Outcome: Progressing  Goal: Performs ADL's with improved pain control throughout shift  Outcome: Progressing  Goal: Participates in PT with improved pain control throughout the shift  Outcome: Progressing

## 2024-09-03 NOTE — PROGRESS NOTES
Joellen Narayan is a 23 y.o. 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    Subjective   Patient seen resting in bed. Reports improvement in pain today and denies any episodes of priapism since 9/1 PM. Continues to void without issue. Feels that morphine PCA pump is working well to control his pain and he is agreeable to trial IVP of morphine today. We discussed that urology is not planning on any surgical intervention so he can have a regular diet today. He otherwise denies any shortness of breath, chest pain, abdominal pain, N/V/D, F/C, H/A,        Objective     Physical Exam  Constitutional:       Appearance: Normal appearance.   Eyes:      General: Scleral icterus present.   Cardiovascular:      Rate and Rhythm: Normal rate and regular rhythm.      Pulses: Normal pulses.      Heart sounds: Normal heart sounds.   Pulmonary:      Effort: Pulmonary effort is normal.      Breath sounds: Normal breath sounds.   Abdominal:      General: Abdomen is flat. Bowel sounds are normal. There is no distension.      Palpations: Abdomen is soft.      Tenderness: There is no abdominal tenderness. There is no guarding.   Musculoskeletal:      Cervical back: Normal range of motion and neck supple.   Neurological:      General: No focal deficit present.      Mental Status: He is alert.         Last Recorded Vitals  Blood pressure 121/67, pulse 99, temperature 36.7 °C (98.1 °F), temperature source Temporal, resp. rate 18, SpO2 92%.  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 Attempted drainage in ED with Urology, patient unable to tolerate so was given ketamine. Priapism initially resolved without drainage upon ketamine infusion but returned a few hours later and he was taken to OR for drainage. Admitted for further pain control, started on dilaudid IVP. Priapism continued on 9/1, requiring multiple doses of sudafed without resolution. Heme consulted 9/1 for RBCEx given persistent priapism, rec no RBCEx at this time, schedule sudafed q8h and added ibuprofen, baclofen 5mg TID and gabapentin 100mg TID. Penile doppler US (9/1) showing small caliber of bilateral cavernosal arteries with lack of color flow in portions of both arteries, more on the right, c/w low-flow/ischemic priapism. Urology notified, s/p phenylephrine injection with urology 9/1 evening with partial resolution of priapism, okay to advance diet 9/3 per urology as not planning surgical intervention at this time. Patient started on morphine PCA on 9/2 for uncontrolled pain, 9/3 start IVP morphine.      # Priapism  - frequent episodes within the last month, has had 3 ED visitis with same complaint  - Pt missed urology FUV 7/2, new appt 10/7 (will try for sooner appt)  - 8/31 Presented to the ED with priapism >14hrs  - Urology consulted and attempted drainage in ED, patient unable to tolerate so was given ketamine; Priapism initially resolved without drainage upon ketamine infusion but returned a few hours later and he was taken to OR for drainage  - s/p OR 8/31 with urology for priapism drainage, penile block, and corpora cavernosa irrigation  - priapism continued on 9/1, requiring multiple doses of sudafed without resolution  - s/p dPCA for pain control; initially 0.2mg demand q10min with 1 hr max  of 1mg and then later increased to 0.4mg demand q6min with 1hr max of 2.5mg   - s/p morphine PCA 9/2 as dilaudid did not seem to be helping pain; 2mg demand dose q10 min with 1 hr max of 12mg (9/2--9/3)  - 9/3 start IVP morphine 5mg q2 hours PRN severe pain   - Heme consulted 9/1 for RBCEx given persistent priapism, rec no RBCEx at this time, schedule sudafed q8h and add ibuprofen  - urology continuing to follow, requests penile doppler US given priapism continuing   - penile doppler US (9/1) showing small caliber of bilateral cavernosal arteries with lack of color flow in portions of both arteries, more on the right, c/w low-flow/ischemic priapism   - s/p phenylephrine injection with urology 9/1 evening with partial resolution of priapism  - 9/3 okay for diet per urology as no plans for penile shunt given no priapism since 9/1.   - added baclofen 5mg TID and gabapentin 100mg TID; per urology, some small studies that suggest potential role in reducing recurrent priapism      # Hgb SS with severe pain  - OARRS reviewed, no aberrancies  - No current care path  - Hgb 7.7 on 9/3, baseline ~8.5; no indication for simple transfusion   - Tbili 12.2 (9/3), Tbili baseline fluctuates ~5-13  - LDH baseline ~500,  (8/31)  - up trending WBC (18.3) on 9/3, no s/sx of infection, continue to monitor  - Hgb S (8/31): 75.5%   - s/p d IV dilaudid 2mg q2hrs PRN severe pain (8/31-9/1)  - s/p dPCA for pain control; initially 0.2mg demand q10min with 1 hr max of 1mg and then later increased to 0.4mg demand q6min with 1hr max of 2.5mg   - s/p morphine PCA 9/2 as dilaudid did not seem to be helping pain; 2mg demand dose q10 min with 1 hr max of 12mg (9/2--9/3)  - 9/3 start IVP morphine 5mg q2 hours PRN severe pain   - Continue folic acid 1mg daily  - PO Zofran PRN for opioid-induced nausea, PO Benadryl PRN for opioid-induced pruritus, Bowel regimen for opioid-induced constipation with DocuSenna 2tabs BID and Miralax daily      #  Nodular lymphocyte predominant Hodgkins lymphoma (NLPHL)   - Primary Oncologist: Dr. Stoll  - Enlarged lymph nodes noted 4/1/22  - RUQ US (11/14/22) with mildly enlarged LNs in the region of the kavin hepatis  - MRI liver (11/16/22) showed re-demonstration of bulky retroperitoneal lymphadenopathy and kavin hepatic lymphadenopathy    - (11/18/22) lymph node biopsy showed atypical lymphoid infiltrate. Reviewed by Hemepath board, insufficient for lymphoma diagnosis  - PET/CT (12/6/22) showing retroperitoneal lymphadenopathy  - Followed up with Dr. Stoll (12/16/22) with plan for surg/onc consult for large tissue bx but patient missed apt and was lost to follow up  - Requested new apt with Dr. Ronnie Marte 6/19/23, patient was not seen and lost to follow up  - CT a/p (11/28/23) increased size of retroperitoneal lymph nodes reflecting extramedullary hematopoiesis I/s/o sickle cell vs lymphoma  - Paraaortic LN bx via IR (11/30/23) consistent with NLPHL. Flow: no clonal B cell or T cell population identified, lymphocyte 95%, CD3+CD4+ 68%, CD3+CD8+ 7%, CD19+ 24%  - Elevated LDH/bili partially from sickle cell disease   - Chemotherapy (R-CHOP) was discussed with primary oncologist Dr. Stoll, and decision was made to simplify his chemotherapy to Rituxan and prednisone q3 weeks mainly due to frequent sickle cell crisis  - Current chemo regimen: rituxan and prednisone q3 weeks (C1 1/18/24, C2 2/8/24, Missed C3 d/t sickle cell pain crisis, C4 4/4/24, C5 5/16/24, C6 6/7/24)  - Per pt no longer taking Acyclovir 400mg BID prophy   - PET CT (7/25) overall showing great response to treatment with persistent residual viable disease involving a left common iliac node  - Last FUV with Dr. Stoll 7/25/24 rec RTC in 2 months (next FUV scheduled for 9/19)     # Choledocholithiasis  - s/p ERCP 7/18/24 with a biliary sphincterotomy where sludge and stones were removed, achieving complete clearance  - Seen by gen surg 8/8/24 Dr. Dove who  "rec lap cholecystectomy d/t the chance of recurrence of choledocholithiasis  - Surgery has yet to be scheduled  - Tbili 12.2 (9/3), Tbili baseline fluctuates ~5-13which is markedly improved, recent peak at 52.8 prior to ERCP during recent hospital admission  - As of 9/3 patient without any abdominal pain, consider 24 hours of D5 1/2 v. RUQ US if tbili continues to uptrend      # Hx of nocturnal oxygen dependence and hypoxia on room air  - Has not had home oxygen for 2-3 years   - Wean as able, encourage incentive spirometry  - Pulm FUV 8/8- \"Given his history of sickle cell disease, it is important to ensure he has formal sleep testing to look at desaturations and presence of sleep apnea despite not having a convincing history/body habitus typical for suspecting sleep apnea- patients with sickle cell have a higher prevalence of sleep disorders including nocturnal hypoxemia\"--> rec sleep referral for formal testing if deemed appropriate, ABG and O2 destat testing, and TTE with bubble study  - TTE 8/23 showing EF 60-65%, severely dilated LV and LA, + intrapulmonary shunting    - FU with Pulm outpatient (appt requested)     DVT prophy: Lovenox subcutaneous, SCDs, encourage ambulation     DISPO:  - Full code, confirmed on admit  - DC pending improvement in pain  - SUSIE Narayan (Parent) [phone]  - Sickle Cell 9/5, Dr. Stoll 9/19, Urology 10/7, Pulm requested/pending        I spent 60 minutes in the professional and overall care of this patient.    Assessment and plan as above discussed with attending physician, Dr. Bren Stoll, PAKenzieC      "

## 2024-09-04 LAB
ALBUMIN SERPL BCP-MCNC: 4.5 G/DL (ref 3.4–5)
ALP SERPL-CCNC: 160 U/L (ref 33–120)
ALT SERPL W P-5'-P-CCNC: 34 U/L (ref 10–52)
ANION GAP SERPL CALC-SCNC: 14 MMOL/L (ref 10–20)
AST SERPL W P-5'-P-CCNC: 61 U/L (ref 9–39)
BASOPHILS # BLD AUTO: 0.04 X10*3/UL (ref 0–0.1)
BASOPHILS NFR BLD AUTO: 0.3 %
BILIRUB SERPL-MCNC: 11.8 MG/DL (ref 0–1.2)
BUN SERPL-MCNC: 6 MG/DL (ref 6–23)
CALCIUM SERPL-MCNC: 9.8 MG/DL (ref 8.6–10.6)
CHLORIDE SERPL-SCNC: 101 MMOL/L (ref 98–107)
CO2 SERPL-SCNC: 25 MMOL/L (ref 21–32)
CREAT SERPL-MCNC: 0.66 MG/DL (ref 0.5–1.3)
EGFRCR SERPLBLD CKD-EPI 2021: >90 ML/MIN/1.73M*2
EOSINOPHIL # BLD AUTO: 0.16 X10*3/UL (ref 0–0.7)
EOSINOPHIL NFR BLD AUTO: 1 %
ERYTHROCYTE [DISTWIDTH] IN BLOOD BY AUTOMATED COUNT: 20 % (ref 11.5–14.5)
GLUCOSE SERPL-MCNC: 79 MG/DL (ref 74–99)
HCT VFR BLD AUTO: 20.1 % (ref 41–52)
HEMOGLOBIN A2: 3.1 % (ref 2–3.5)
HEMOGLOBIN A: 19.5 % (ref 95.8–98)
HEMOGLOBIN F: 1.9 % (ref 0–2)
HEMOGLOBIN IDENTIFICATION INTERPRETATION: ABNORMAL
HEMOGLOBIN S: 75.5 %
HGB BLD-MCNC: 7.2 G/DL (ref 13.5–17.5)
HGB RETIC QN: 27 PG (ref 28–38)
IMM GRANULOCYTES # BLD AUTO: 0.15 X10*3/UL (ref 0–0.7)
IMM GRANULOCYTES NFR BLD AUTO: 1 % (ref 0–0.9)
IMMATURE RETIC FRACTION: 18.4 %
LDH SERPL L TO P-CCNC: 547 U/L (ref 84–246)
LYMPHOCYTES # BLD AUTO: 2.32 X10*3/UL (ref 1.2–4.8)
LYMPHOCYTES NFR BLD AUTO: 15.2 %
MCH RBC QN AUTO: 28.9 PG (ref 26–34)
MCHC RBC AUTO-ENTMCNC: 35.8 G/DL (ref 32–36)
MCV RBC AUTO: 81 FL (ref 80–100)
MONOCYTES # BLD AUTO: 1.51 X10*3/UL (ref 0.1–1)
MONOCYTES NFR BLD AUTO: 9.9 %
NEUTROPHILS # BLD AUTO: 11.12 X10*3/UL (ref 1.2–7.7)
NEUTROPHILS NFR BLD AUTO: 72.6 %
NRBC BLD-RTO: 0.7 /100 WBCS (ref 0–0)
PATH REVIEW-HGB IDENTIFICATION: ABNORMAL
PLATELET # BLD AUTO: 371 X10*3/UL (ref 150–450)
POTASSIUM SERPL-SCNC: 4 MMOL/L (ref 3.5–5.3)
PROT SERPL-MCNC: 7.3 G/DL (ref 6.4–8.2)
RBC # BLD AUTO: 2.49 X10*6/UL (ref 4.5–5.9)
RETICS #: 0.57 X10*6/UL (ref 0.02–0.12)
RETICS/RBC NFR AUTO: 23.1 % (ref 0.5–2)
SODIUM SERPL-SCNC: 136 MMOL/L (ref 136–145)
WBC # BLD AUTO: 15.3 X10*3/UL (ref 4.4–11.3)

## 2024-09-04 PROCEDURE — 85025 COMPLETE CBC W/AUTO DIFF WBC: CPT

## 2024-09-04 PROCEDURE — 36415 COLL VENOUS BLD VENIPUNCTURE: CPT

## 2024-09-04 PROCEDURE — 1200000003 HC ONCOLOGY  ROOM WITH TELEMETRY DAILY

## 2024-09-04 PROCEDURE — 2500000001 HC RX 250 WO HCPCS SELF ADMINISTERED DRUGS (ALT 637 FOR MEDICARE OP)

## 2024-09-04 PROCEDURE — 85045 AUTOMATED RETICULOCYTE COUNT: CPT

## 2024-09-04 PROCEDURE — 2500000005 HC RX 250 GENERAL PHARMACY W/O HCPCS

## 2024-09-04 PROCEDURE — 80053 COMPREHEN METABOLIC PANEL: CPT

## 2024-09-04 PROCEDURE — 2500000004 HC RX 250 GENERAL PHARMACY W/ HCPCS (ALT 636 FOR OP/ED)

## 2024-09-04 PROCEDURE — 99233 SBSQ HOSP IP/OBS HIGH 50: CPT

## 2024-09-04 PROCEDURE — 83615 LACTATE (LD) (LDH) ENZYME: CPT

## 2024-09-04 PROCEDURE — 2500000002 HC RX 250 W HCPCS SELF ADMINISTERED DRUGS (ALT 637 FOR MEDICARE OP, ALT 636 FOR OP/ED)

## 2024-09-04 RX ORDER — HYDROMORPHONE HYDROCHLORIDE 1 MG/ML
2 INJECTION, SOLUTION INTRAMUSCULAR; INTRAVENOUS; SUBCUTANEOUS EVERY 2 HOUR PRN
Status: DISCONTINUED | OUTPATIENT
Start: 2024-09-04 | End: 2024-09-05

## 2024-09-04 RX ADMIN — HYDROMORPHONE HYDROCHLORIDE 2 MG: 1 INJECTION, SOLUTION INTRAMUSCULAR; INTRAVENOUS; SUBCUTANEOUS at 14:47

## 2024-09-04 RX ADMIN — MORPHINE SULFATE 5 MG: 4 INJECTION, SOLUTION INTRAMUSCULAR; INTRAVENOUS at 07:44

## 2024-09-04 RX ADMIN — PSEUDOEPHEDRINE HYDROCHLORIDE 60 MG: 60 TABLET ORAL at 17:36

## 2024-09-04 RX ADMIN — BACLOFEN 5 MG: 10 TABLET ORAL at 08:37

## 2024-09-04 RX ADMIN — GABAPENTIN 100 MG: 100 CAPSULE ORAL at 14:35

## 2024-09-04 RX ADMIN — MORPHINE SULFATE 5 MG: 4 INJECTION, SOLUTION INTRAMUSCULAR; INTRAVENOUS at 02:03

## 2024-09-04 RX ADMIN — GABAPENTIN 100 MG: 100 CAPSULE ORAL at 22:04

## 2024-09-04 RX ADMIN — PSEUDOEPHEDRINE HYDROCHLORIDE 60 MG: 60 TABLET ORAL at 09:46

## 2024-09-04 RX ADMIN — MORPHINE SULFATE 5 MG: 4 INJECTION, SOLUTION INTRAMUSCULAR; INTRAVENOUS at 04:34

## 2024-09-04 RX ADMIN — PANTOPRAZOLE SODIUM 20 MG: 20 TABLET, DELAYED RELEASE ORAL at 07:44

## 2024-09-04 RX ADMIN — HYDROMORPHONE HYDROCHLORIDE 2 MG: 1 INJECTION, SOLUTION INTRAMUSCULAR; INTRAVENOUS; SUBCUTANEOUS at 12:21

## 2024-09-04 RX ADMIN — HYDROMORPHONE HYDROCHLORIDE 2 MG: 1 INJECTION, SOLUTION INTRAMUSCULAR; INTRAVENOUS; SUBCUTANEOUS at 19:39

## 2024-09-04 RX ADMIN — HYDROMORPHONE HYDROCHLORIDE 2 MG: 1 INJECTION, SOLUTION INTRAMUSCULAR; INTRAVENOUS; SUBCUTANEOUS at 17:36

## 2024-09-04 RX ADMIN — GABAPENTIN 100 MG: 100 CAPSULE ORAL at 07:44

## 2024-09-04 RX ADMIN — HYDROMORPHONE HYDROCHLORIDE 2 MG: 1 INJECTION, SOLUTION INTRAMUSCULAR; INTRAVENOUS; SUBCUTANEOUS at 22:04

## 2024-09-04 RX ADMIN — PSEUDOEPHEDRINE HYDROCHLORIDE 60 MG: 60 TABLET ORAL at 02:03

## 2024-09-04 RX ADMIN — DOCUSATE SODIUM 100 MG: 100 CAPSULE, LIQUID FILLED ORAL at 20:11

## 2024-09-04 RX ADMIN — BACLOFEN 5 MG: 10 TABLET ORAL at 22:04

## 2024-09-04 RX ADMIN — HYDROMORPHONE HYDROCHLORIDE 2 MG: 1 INJECTION, SOLUTION INTRAMUSCULAR; INTRAVENOUS; SUBCUTANEOUS at 09:46

## 2024-09-04 RX ADMIN — Medication 2 L/MIN: at 22:13

## 2024-09-04 RX ADMIN — FOLIC ACID 1 MG: 1 TABLET ORAL at 08:38

## 2024-09-04 RX ADMIN — BACLOFEN 5 MG: 10 TABLET ORAL at 14:35

## 2024-09-04 ASSESSMENT — PAIN SCALES - GENERAL
PAINLEVEL_OUTOF10: 9
PAINLEVEL_OUTOF10: 9
PAINLEVEL_OUTOF10: 8
PAINLEVEL_OUTOF10: 9
PAINLEVEL_OUTOF10: 8
PAINLEVEL_OUTOF10: 8
PAINLEVEL_OUTOF10: 9
PAINLEVEL_OUTOF10: 5 - MODERATE PAIN
PAINLEVEL_OUTOF10: 8
PAINLEVEL_OUTOF10: 9
PAINLEVEL_OUTOF10: 9

## 2024-09-04 ASSESSMENT — PAIN DESCRIPTION - LOCATION
LOCATION: GROIN

## 2024-09-04 ASSESSMENT — PAIN - FUNCTIONAL ASSESSMENT: PAIN_FUNCTIONAL_ASSESSMENT: 0-10

## 2024-09-04 NOTE — PROGRESS NOTES
Joellen Narayan is a 23 y.o. 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    Subjective   Patient seen resting in bed. Reports continued pain this morning localized to his groin area, rates it as a 9/10 this morning. Feels that is slightly wose this morning than it was yesterday on 9/3. Patient is requesting that we trial IVP dilaudid instead of morphine today. Denies any further episodes of priapism since 9/1, continues to void spontaneously. He is eating and drinking well and having bowel movements. We discussed uptredning lysis labs of WBC and Tbili on 9/3 (labs 9/4 still pending at time of interview). He denies any infectious symptoms such as cough, congestion, fevers or chills. He also denies any abdominal pain        Objective     Physical Exam  Constitutional:       Appearance: Normal appearance.   HENT:      Mouth/Throat:      Mouth: Mucous membranes are moist.   Eyes:      General: Scleral icterus present.      Pupils: Pupils are equal, round, and reactive to light.   Cardiovascular:      Rate and Rhythm: Normal rate and regular rhythm.      Pulses: Normal pulses.      Heart sounds: Normal heart sounds.   Pulmonary:      Effort: Pulmonary effort is normal.      Breath sounds: Normal breath sounds.   Abdominal:      General: Abdomen is flat. Bowel sounds are normal.      Palpations: Abdomen is soft.   Musculoskeletal:         General: Normal range of motion.      Cervical back: Normal range of motion and neck supple.   Skin:     General: Skin is warm.   Neurological:      General: No focal deficit present.      Mental Status: He is alert.   Psychiatric:         Mood and Affect: Mood normal.         Last Recorded Vitals  Blood pressure 133/75, pulse 101, temperature 36.2 °C (97.2 °F), temperature source Temporal, resp. rate 16, SpO2 93%.  Intake/Output last 3 Shifts:  I/O last 3 completed shifts:  In: 500 [P.O.:500]  Out: 3600 [Urine:3600]    Relevant Results                  Scheduled medications  baclofen, 5 mg, oral, TID  enoxaparin, 40 mg, subcutaneous, q24h  folic acid, 1 mg, oral, Daily  gabapentin, 100 mg, oral, q8h REYNALDO  oxygen, , inhalation, Continuous - Inhalation  pantoprazole, 20 mg, oral, Daily before breakfast  pseudoephedrine, 60 mg, oral, q8h      Continuous medications     PRN medications  PRN medications: diphenhydrAMINE, docusate sodium **OR** polyethylene glycol, HYDROmorphone, ibuprofen, naloxone, naloxone, naloxone, ondansetron ODT                 Assessment/Plan   Assessment & Plan  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 ED 8/31 with priapism since 8/30 at 10AM. Attempted drainage in ED with Urology, patient unable to tolerate so was given ketamine. Priapism initially resolved without drainage upon ketamine infusion but returned a few hours later and he was taken to OR for drainage. Admitted for further pain control, started on dilaudid IVP. Priapism continued on 9/1, requiring multiple doses of sudafed without resolution. Heme consulted 9/1 for RBCEx given persistent priapism, rec no RBCEx at this time, schedule sudafed q8h and added ibuprofen, baclofen 5mg TID and gabapentin 100mg TID. Penile doppler US (9/1) showing small caliber of bilateral cavernosal arteries with lack of color flow in portions of both arteries, more on the right, c/w low-flow/ischemic priapism. Urology notified, s/p phenylephrine injection with urology 9/1 evening with partial resolution of priapism, okay to advance diet 9/3 per urology as not planning  surgical intervention at this time. Patient started on morphine PCA on 9/2 for uncontrolled pain, 9/3 start IVP morphine, 9/4 changed IVP morphine to IVP dilaudid. DC home pending further control of pain      # Priapism  - frequent episodes within the last month, has had 3 ED visitis with same complaint  - Pt missed urology FUV 7/2, new appt 10/7 (will try for sooner appt)  - 8/31 Presented to the ED with priapism >14hrs  - Urology consulted and attempted drainage in ED, patient unable to tolerate so was given ketamine; Priapism initially resolved without drainage upon ketamine infusion but returned a few hours later and he was taken to OR for drainage  - s/p OR 8/31 with urology for priapism drainage, penile block, and corpora cavernosa irrigation  - priapism continued on 9/1, requiring multiple doses of sudafed without resolution  - s/p dPCA for pain control; initially 0.2mg demand q10min with 1 hr max of 1mg and then later increased to 0.4mg demand q6min with 1hr max of 2.5mg   - s/p morphine PCA 9/2 as dilaudid did not seem to be helping pain; 2mg demand dose q10 min with 1 hr max of 12mg (9/2--9/3)  - s/p  IVP morphine 5mg q2 hours PRN severe pain (9/3-9/4)  - 9/4 re-start IVP dilaudid 2mg q2 hours PRN severe pain per patient request   - Heme consulted 9/1 for RBCEx given persistent priapism, rec no RBCEx at this time, schedule sudafed q8h and add ibuprofen  - penile doppler US (9/1) showing small caliber of bilateral cavernosal arteries with lack of color flow in portions of both arteries, more on the right, c/w low-flow/ischemic priapism   - urology consulted and s/p phenylephrine injection with urology 9/1 evening with partial resolution of priapism  - Per urology kept patient npo for possible OR for penile shunt 9/1-9/2   - urology continuing to follow, as of 9/3 no plans for further urologic intervention or penile shunt given no priapism since 9/1   - added baclofen 5mg TID and gabapentin 100mg TID; per  urology, some small studies that suggest potential role in reducing recurrent priapism      # Hgb SS with severe pain  - OARRS reviewed, no aberrancies  - No current care path  - Hgb 7.72 on 9/4, baseline ~8.5; no indication for simple transfusion   - Tbili 12.2 (9/3)-->11.8 (9/4), Tbili baseline fluctuates ~5-13  - LDH  547 (9/4), baseline ~500   - up trending WBC (18.3) on 9/3 as of 9/4 starting to downtrend (15.3), no s/sx of infection likely reactive in setting of sickle cell, continue to monitor  - Hgb S (8/31): 75.5%   - s/p d IV dilaudid 2mg q2hrs PRN severe pain (8/31-9/1)  - s/p dPCA for pain control; initially 0.2mg demand q10min with 1 hr max of 1mg and then later increased to 0.4mg demand q6min with 1hr max of 2.5mg   - s/p morphine PCA 9/2 as dilaudid did not seem to be helping pain; 2mg demand dose q10 min with 1 hr max of 12mg (9/2--9/3)  - s/p IVP morphine 5mg q2 hours PRN severe pain (9/3-9/4)  - 9/4 re-start IVP dilaudid 2mg q2 hours PRN severe pain per patient request   - Continue folic acid 1mg daily  - PO Zofran PRN for opioid-induced nausea, PO Benadryl PRN for opioid-induced pruritus, Bowel regimen for opioid-induced constipation with DocuSenna 2tabs BID and Miralax daily      # Nodular lymphocyte predominant Hodgkins lymphoma (NLPHL)   - Primary Oncologist: Dr. Stoll  - Enlarged lymph nodes noted 4/1/22  - RUQ US (11/14/22) with mildly enlarged LNs in the region of the kavin hepatis  - MRI liver (11/16/22) showed re-demonstration of bulky retroperitoneal lymphadenopathy and kavin hepatic lymphadenopathy    - (11/18/22) lymph node biopsy showed atypical lymphoid infiltrate. Reviewed by Hemepath board, insufficient for lymphoma diagnosis  - PET/CT (12/6/22) showing retroperitoneal lymphadenopathy  - Followed up with Dr. Stoll (12/16/22) with plan for surg/onc consult for large tissue bx but patient missed apt and was lost to follow up  - Requested new apt with Dr. Ronnie Marte 6/19/23, patient  "was not seen and lost to follow up  - CT a/p (11/28/23) increased size of retroperitoneal lymph nodes reflecting extramedullary hematopoiesis I/s/o sickle cell vs lymphoma  - Paraaortic LN bx via IR (11/30/23) consistent with NLPHL. Flow: no clonal B cell or T cell population identified, lymphocyte 95%, CD3+CD4+ 68%, CD3+CD8+ 7%, CD19+ 24%  - Elevated LDH/bili partially from sickle cell disease   - Chemotherapy (R-CHOP) was discussed with primary oncologist Dr. Stoll, and decision was made to simplify his chemotherapy to Rituxan and prednisone q3 weeks mainly due to frequent sickle cell crisis  - Current chemo regimen: rituxan and prednisone q3 weeks (C1 1/18/24, C2 2/8/24, Missed C3 d/t sickle cell pain crisis, C4 4/4/24, C5 5/16/24, C6 6/7/24)  - Per pt no longer taking Acyclovir 400mg BID prophy   - PET CT (7/25) overall showing great response to treatment with persistent residual viable disease involving a left common iliac node  - Last FUV with Dr. Stoll 7/25/24 rec RTC in 2 months (next FUV scheduled for 9/19)     # Choledocholithiasis  - s/p ERCP 7/18/24 with a biliary sphincterotomy where sludge and stones were removed, achieving complete clearance  - Seen by gen surg 8/8/24 Dr. Dove who rec lap cholecystectomy d/t the chance of recurrence of choledocholithiasis  - Surgery has yet to be scheduled, referral placed for scheduling   - Tbili 12.2 (9/3) --> 11.8 (9/4), Tbili baseline fluctuates ~5-13 which is markedly improved, recent peak at 52.8 prior to ERCP during recent hospital admission  - As of 9/4 patient without any abdominal pain, consider 24 hours of D5 1/2 v. RUQ US if tbili continues to uptrend      # Hx of nocturnal oxygen dependence and hypoxia on room air  - Has not had home oxygen for 2-3 years   - Wean as able, encourage incentive spirometry  - Pulm FUV 8/8- \"Given his history of sickle cell disease, it is important to ensure he has formal sleep testing to look at desaturations and " "presence of sleep apnea despite not having a convincing history/body habitus typical for suspecting sleep apnea- patients with sickle cell have a higher prevalence of sleep disorders including nocturnal hypoxemia\"--> rec sleep referral for formal testing if deemed appropriate, ABG and O2 destat testing, and TTE with bubble study  - TTE 8/23 showing EF 60-65%, severely dilated LV and LA, + intrapulmonary shunting    - FU with Pulm outpatient (appt requested)     DVT prophy: Lovenox subcutaneous, SCDs, encourage ambulation     DISPO:  - Full code, confirmed on admit  - DC pending improvement in pain  - SUSIE Narayan (Parent) [phone]  - Sickle Cell 9/5- will request reschedule d/t admission, Dr. Stoll 9/19, Urology 10/7, Pulm requested/pending           I spent 60 minutes in the professional and overall care of this patient.    Assessment and plan as above discussed with attending physician, Dr. Bren Stoll, PAKenzieC      "

## 2024-09-05 ENCOUNTER — APPOINTMENT (OUTPATIENT)
Dept: HEMATOLOGY/ONCOLOGY | Facility: HOSPITAL | Age: 24
End: 2024-09-05
Payer: COMMERCIAL

## 2024-09-05 LAB
ABO GROUP (TYPE) IN BLOOD: NORMAL
ALBUMIN SERPL BCP-MCNC: 4.4 G/DL (ref 3.4–5)
ALP SERPL-CCNC: 167 U/L (ref 33–120)
ALT SERPL W P-5'-P-CCNC: 35 U/L (ref 10–52)
ANION GAP SERPL CALC-SCNC: 13 MMOL/L (ref 10–20)
ANTIBODY SCREEN: NORMAL
AST SERPL W P-5'-P-CCNC: 52 U/L (ref 9–39)
BASOPHILS # BLD AUTO: 0.03 X10*3/UL (ref 0–0.1)
BASOPHILS NFR BLD AUTO: 0.2 %
BILIRUB SERPL-MCNC: 8.3 MG/DL (ref 0–1.2)
BUN SERPL-MCNC: 5 MG/DL (ref 6–23)
CALCIUM SERPL-MCNC: 9.4 MG/DL (ref 8.6–10.6)
CHLORIDE SERPL-SCNC: 102 MMOL/L (ref 98–107)
CO2 SERPL-SCNC: 25 MMOL/L (ref 21–32)
CREAT SERPL-MCNC: 0.56 MG/DL (ref 0.5–1.3)
EGFRCR SERPLBLD CKD-EPI 2021: >90 ML/MIN/1.73M*2
EOSINOPHIL # BLD AUTO: 0.48 X10*3/UL (ref 0–0.7)
EOSINOPHIL NFR BLD AUTO: 3.3 %
ERYTHROCYTE [DISTWIDTH] IN BLOOD BY AUTOMATED COUNT: 20.7 % (ref 11.5–14.5)
GLUCOSE SERPL-MCNC: 81 MG/DL (ref 74–99)
HCT VFR BLD AUTO: 18.5 % (ref 41–52)
HGB BLD-MCNC: 6.6 G/DL (ref 13.5–17.5)
HGB RETIC QN: 28 PG (ref 28–38)
IMM GRANULOCYTES # BLD AUTO: 0.2 X10*3/UL (ref 0–0.7)
IMM GRANULOCYTES NFR BLD AUTO: 1.4 % (ref 0–0.9)
IMMATURE RETIC FRACTION: 20 %
LDH SERPL L TO P-CCNC: 488 U/L (ref 84–246)
LYMPHOCYTES # BLD AUTO: 2.14 X10*3/UL (ref 1.2–4.8)
LYMPHOCYTES NFR BLD AUTO: 14.8 %
MCH RBC QN AUTO: 29.1 PG (ref 26–34)
MCHC RBC AUTO-ENTMCNC: 35.7 G/DL (ref 32–36)
MCV RBC AUTO: 82 FL (ref 80–100)
MONOCYTES # BLD AUTO: 1.65 X10*3/UL (ref 0.1–1)
MONOCYTES NFR BLD AUTO: 11.4 %
NEUTROPHILS # BLD AUTO: 9.93 X10*3/UL (ref 1.2–7.7)
NEUTROPHILS NFR BLD AUTO: 68.9 %
NRBC BLD-RTO: 1.1 /100 WBCS (ref 0–0)
PAPPENHEIMER BOD BLD QL SMEAR: PRESENT
PLATELET # BLD AUTO: 489 X10*3/UL (ref 150–450)
POLYCHROMASIA BLD QL SMEAR: NORMAL
POTASSIUM SERPL-SCNC: 3.9 MMOL/L (ref 3.5–5.3)
PROT SERPL-MCNC: 6.7 G/DL (ref 6.4–8.2)
RBC # BLD AUTO: 2.27 X10*6/UL (ref 4.5–5.9)
RBC MORPH BLD: NORMAL
RETICS #: 0.55 X10*6/UL (ref 0.02–0.12)
RETICS/RBC NFR AUTO: 24.2 % (ref 0.5–2)
RH FACTOR (ANTIGEN D): NORMAL
SICKLE CELLS BLD QL SMEAR: NORMAL
SODIUM SERPL-SCNC: 136 MMOL/L (ref 136–145)
TARGETS BLD QL SMEAR: NORMAL
WBC # BLD AUTO: 14.4 X10*3/UL (ref 4.4–11.3)

## 2024-09-05 PROCEDURE — 1200000003 HC ONCOLOGY  ROOM WITH TELEMETRY DAILY

## 2024-09-05 PROCEDURE — 2500000005 HC RX 250 GENERAL PHARMACY W/O HCPCS

## 2024-09-05 PROCEDURE — 80053 COMPREHEN METABOLIC PANEL: CPT

## 2024-09-05 PROCEDURE — 99233 SBSQ HOSP IP/OBS HIGH 50: CPT

## 2024-09-05 PROCEDURE — 86902 BLOOD TYPE ANTIGEN DONOR EA: CPT

## 2024-09-05 PROCEDURE — 2500000002 HC RX 250 W HCPCS SELF ADMINISTERED DRUGS (ALT 637 FOR MEDICARE OP, ALT 636 FOR OP/ED)

## 2024-09-05 PROCEDURE — P9040 RBC LEUKOREDUCED IRRADIATED: HCPCS

## 2024-09-05 PROCEDURE — 83615 LACTATE (LD) (LDH) ENZYME: CPT

## 2024-09-05 PROCEDURE — 36430 TRANSFUSION BLD/BLD COMPNT: CPT | Performed by: REGISTERED NURSE

## 2024-09-05 PROCEDURE — 85045 AUTOMATED RETICULOCYTE COUNT: CPT

## 2024-09-05 PROCEDURE — 85660 RBC SICKLE CELL TEST: CPT

## 2024-09-05 PROCEDURE — 86920 COMPATIBILITY TEST SPIN: CPT

## 2024-09-05 PROCEDURE — 36415 COLL VENOUS BLD VENIPUNCTURE: CPT

## 2024-09-05 PROCEDURE — 2500000004 HC RX 250 GENERAL PHARMACY W/ HCPCS (ALT 636 FOR OP/ED)

## 2024-09-05 PROCEDURE — 2500000001 HC RX 250 WO HCPCS SELF ADMINISTERED DRUGS (ALT 637 FOR MEDICARE OP)

## 2024-09-05 PROCEDURE — 85025 COMPLETE CBC W/AUTO DIFF WBC: CPT

## 2024-09-05 PROCEDURE — 86901 BLOOD TYPING SEROLOGIC RH(D): CPT

## 2024-09-05 RX ADMIN — HYDROMORPHONE HYDROCHLORIDE 2.5 MG: 2 INJECTION, SOLUTION INTRAMUSCULAR; INTRAVENOUS; SUBCUTANEOUS at 15:14

## 2024-09-05 RX ADMIN — PSEUDOEPHEDRINE HYDROCHLORIDE 60 MG: 60 TABLET ORAL at 17:46

## 2024-09-05 RX ADMIN — GABAPENTIN 100 MG: 100 CAPSULE ORAL at 13:08

## 2024-09-05 RX ADMIN — PANTOPRAZOLE SODIUM 20 MG: 20 TABLET, DELAYED RELEASE ORAL at 06:11

## 2024-09-05 RX ADMIN — Medication 21 PERCENT: at 20:06

## 2024-09-05 RX ADMIN — FOLIC ACID 1 MG: 1 TABLET ORAL at 09:01

## 2024-09-05 RX ADMIN — HYDROMORPHONE HYDROCHLORIDE 2.5 MG: 2 INJECTION, SOLUTION INTRAMUSCULAR; INTRAVENOUS; SUBCUTANEOUS at 22:19

## 2024-09-05 RX ADMIN — BACLOFEN 5 MG: 10 TABLET ORAL at 20:00

## 2024-09-05 RX ADMIN — BACLOFEN 5 MG: 10 TABLET ORAL at 09:00

## 2024-09-05 RX ADMIN — HYDROMORPHONE HYDROCHLORIDE 2 MG: 1 INJECTION, SOLUTION INTRAMUSCULAR; INTRAVENOUS; SUBCUTANEOUS at 05:57

## 2024-09-05 RX ADMIN — GABAPENTIN 100 MG: 100 CAPSULE ORAL at 22:19

## 2024-09-05 RX ADMIN — HYDROMORPHONE HYDROCHLORIDE 2.5 MG: 2 INJECTION, SOLUTION INTRAMUSCULAR; INTRAVENOUS; SUBCUTANEOUS at 11:04

## 2024-09-05 RX ADMIN — HYDROMORPHONE HYDROCHLORIDE 2.5 MG: 2 INJECTION, SOLUTION INTRAMUSCULAR; INTRAVENOUS; SUBCUTANEOUS at 13:08

## 2024-09-05 RX ADMIN — HYDROMORPHONE HYDROCHLORIDE 2.5 MG: 2 INJECTION, SOLUTION INTRAMUSCULAR; INTRAVENOUS; SUBCUTANEOUS at 17:46

## 2024-09-05 RX ADMIN — HYDROMORPHONE HYDROCHLORIDE 2.5 MG: 2 INJECTION, SOLUTION INTRAMUSCULAR; INTRAVENOUS; SUBCUTANEOUS at 20:00

## 2024-09-05 RX ADMIN — HYDROMORPHONE HYDROCHLORIDE 2 MG: 1 INJECTION, SOLUTION INTRAMUSCULAR; INTRAVENOUS; SUBCUTANEOUS at 01:00

## 2024-09-05 RX ADMIN — HYDROMORPHONE HYDROCHLORIDE 2 MG: 1 INJECTION, SOLUTION INTRAMUSCULAR; INTRAVENOUS; SUBCUTANEOUS at 09:01

## 2024-09-05 RX ADMIN — GABAPENTIN 100 MG: 100 CAPSULE ORAL at 05:56

## 2024-09-05 RX ADMIN — BACLOFEN 5 MG: 10 TABLET ORAL at 15:14

## 2024-09-05 RX ADMIN — PSEUDOEPHEDRINE HYDROCHLORIDE 60 MG: 60 TABLET ORAL at 01:00

## 2024-09-05 RX ADMIN — PSEUDOEPHEDRINE HYDROCHLORIDE 60 MG: 60 TABLET ORAL at 11:01

## 2024-09-05 RX ADMIN — HYDROMORPHONE HYDROCHLORIDE 2 MG: 1 INJECTION, SOLUTION INTRAMUSCULAR; INTRAVENOUS; SUBCUTANEOUS at 03:23

## 2024-09-05 ASSESSMENT — PAIN SCALES - GENERAL
PAINLEVEL_OUTOF10: 9
PAINLEVEL_OUTOF10: 8
PAINLEVEL_OUTOF10: 9
PAINLEVEL_OUTOF10: 9

## 2024-09-05 ASSESSMENT — PAIN DESCRIPTION - LOCATION: LOCATION: GROIN

## 2024-09-05 ASSESSMENT — PAIN - FUNCTIONAL ASSESSMENT: PAIN_FUNCTIONAL_ASSESSMENT: 0-10

## 2024-09-05 NOTE — PROGRESS NOTES
Spiritual Care Visit    Clinical Encounter Type  Visited With: Patient  Routine Visit: Follow-up  Continue Visiting: Yes     visited patient Joellen Narayan. Patient and  known to one another from previous visits. Patient expressed he was experiencing pain and was not having a great day.  acknowledged and validated patient's feelings.  asked if he would like to meet facility dog Baptiste, and if he would be receptive to  Media Relations filming the encounter.  emphasized patient did not have to if he wasn't feeling up to it; patient expressed he would like to meet facility dog and consented to filming.    Proper hand hygiene with  was observed before and after visit, and a clean sheet was used as a barrier on patient's bed for facility dog to visit. Patient expressed feeling comforted by facility dog's presence and that it distracted him from pain. Visit was kept brief, and  offered to follow up at another time for further conversation. Patient was agreeable and appreciative of visit; he did not have any needs at this time.  will follow up and Spiritual Care remains available as needed/requested.    Rev. Diana Álvarez MDiv, BCC

## 2024-09-05 NOTE — PROGRESS NOTES
Joellen Narayan is a 23 y.o. 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    Subjective   Patient seen resting in bed. Reports that he did not get much sleep last night due to continued severe pain in his groin area, rates it as a 9/10 this morning. Discussed increasing IVP dilaudid to 2.5mg q2 hours today. Also discussed that upon urologies examination yesterday, an additional phenyl epinephrine injection would not help the pain unless he is having another priapism, patient understanding. Also discussed improving hemolysis labs but drop in hgb today (6.6), agreeable to 1 unit PRBC. He is eating and drinking well and having bowel movements. Otherwise denies shortness of breath, chest pain, abdominal pain, N/V/D, F/C, H/A.        Objective     Physical Exam  Constitutional:       Appearance: Normal appearance.   HENT:      Head: Normocephalic.      Mouth/Throat:      Mouth: Mucous membranes are moist.   Eyes:      General: Scleral icterus present.      Pupils: Pupils are equal, round, and reactive to light.   Cardiovascular:      Rate and Rhythm: Normal rate and regular rhythm.      Pulses: Normal pulses.      Heart sounds: Normal heart sounds.   Pulmonary:      Effort: Pulmonary effort is normal.      Breath sounds: Normal breath sounds.   Abdominal:      General: Abdomen is flat. Bowel sounds are normal.      Palpations: Abdomen is soft.   Musculoskeletal:         General: Normal range of motion.      Cervical back: Normal range of motion.   Skin:     General: Skin is warm.   Neurological:      General: No focal deficit present.      Mental Status: He is alert.   Psychiatric:         Mood and Affect: Mood normal.         Last Recorded Vitals  Blood pressure 130/65, pulse 88, temperature 36.3 °C (97.3 °F), temperature source Temporal, resp. rate 16, SpO2 97%.  Intake/Output last 3 Shifts:  I/O last 3 completed shifts:  In: -   Out: 1600 [Urine:1600]    Relevant  Results                   Scheduled medications  baclofen, 5 mg, oral, TID  enoxaparin, 40 mg, subcutaneous, q24h  folic acid, 1 mg, oral, Daily  gabapentin, 100 mg, oral, q8h REYNALDO  oxygen, , inhalation, Continuous - Inhalation  pantoprazole, 20 mg, oral, Daily before breakfast  pseudoephedrine, 60 mg, oral, q8h      Continuous medications     PRN medications  PRN medications: diphenhydrAMINE, docusate sodium **OR** polyethylene glycol, HYDROmorphone, ibuprofen, naloxone, naloxone, naloxone, ondansetron ODT             Assessment/Plan   Assessment & Plan  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 Attempted drainage in ED with Urology, patient unable to tolerate so was given ketamine. Priapism initially resolved without drainage upon ketamine infusion but returned a few hours later and he was taken to OR for drainage on 8/31. Admitted for further pain control, started on dilaudid IVP. Priapism continued on 9/1, requiring multiple doses of sudafed without resolution. Heme consulted 9/1 for RBCEx given persistent priapism, rec no RBCEx at this time, schedule sudafed q8h and added ibuprofen, baclofen 5mg TID and gabapentin 100mg TID. Penile doppler US (9/1) showing small caliber of bilateral cavernosal arteries with lack of color flow in portions of both arteries, more on the right, c/w low-flow/ischemic priapism. Urology notified, s/p phenylephrine injection with urology 9/1 evening with partial resolution of priapism, okay to advance diet 9/3 per urology  as not planning surgical intervention at this time. Patient started on morphine PCA on 9/2 for uncontrolled pain, 9/3 start IVP morphine, 9/4 changed IVP morphine to IVP dilaudid due to continued pain. DC home pending further control of pain      # Priapism  - frequent episodes within the last month, has had 3 ED visitis with same complaint  - Pt missed urology FUV 7/2, new appt 10/7 (will try for sooner appt)  - 8/31 Presented to the ED with priapism >14hrs  - Urology consulted and attempted drainage in ED, patient unable to tolerate so was given ketamine; Priapism initially resolved without drainage upon ketamine infusion but returned a few hours later and he was taken to OR for drainage  - s/p OR 8/31 with urology for priapism drainage, penile block, and corpora cavernosa irrigation  - priapism continued on 9/1, requiring multiple doses of sudafed without resolution  - urology consulted and s/p phenylephrine injection with urology 9/1 evening with partial resolution of priapism  - Heme consulted 9/1 for RBCEx given persistent priapism, rec no RBCEx at this time, schedule sudafed q8h and add ibuprofen  - penile doppler US (9/1) showing small caliber of bilateral cavernosal arteries with lack of color flow in portions of both arteries, more on the right, c/w low-flow/ischemic priapism   - Per urology kept patient npo for possible OR for penile shunt 9/1-9/2, urology has no plans for penile shunt at this time   - 9/4 re-engaged urology for continued severe pain and pt. Requesting additional phenylepinephrine injection. Per urology, no indication at this time and recommended further pain control   - s/p dPCA for pain control; initially 0.2mg demand q10min with 1 hr max of 1mg and then later increased to 0.4mg demand q6min with 1hr max of 2.5mg   - s/p morphine PCA 9/2 as dilaudid did not seem to be helping pain; 2mg demand dose q10 min with 1 hr max of 12mg (9/2--9/3)  - s/p  IVP morphine 5mg q2 hours PRN severe pain  (9/3-9/4)  - s/p IVP dilaudid 2mg q2 hours PRN severe pain per patient request (9/4-9/5)   - 9/5 increased dose of IVP dilaudid 2.5mg q2 hours PRN severe pain (9/5-)   - continue with scheduled sudafed q8 hours   - added baclofen 5mg TID and gabapentin 100mg TID; per urology, some small studies that suggest potential role in reducing recurrent priapism      # Hgb SS with severe pain  - OARRS reviewed, no aberrancies  - No current care path  - Hgb 6.6 on 9/5 (baseline ~8.5); plan 1 unit PRBC on 9/5   - Tbili 12.2 (9/3)-->11.8 (9/4)--> 8.3 (9/5), Tbili baseline fluctuates ~5-13  - up trending WBC (18.3) on 9/3 as of 9/5 continuing to downtrend (14.4), no s/sx of infection likely reactive in setting of sickle cell, continue to monitor  - Hgb S (8/31): 75.5%   - s/p d IV dilaudid 2mg q2hrs PRN severe pain (8/31-9/1)  - s/p dPCA for pain control; initially 0.2mg demand q10min with 1 hr max of 1mg and then later increased to 0.4mg demand q6min with 1hr max of 2.5mg   - s/p morphine PCA 9/2 as dilaudid did not seem to be helping pain; 2mg demand dose q10 min with 1 hr max of 12mg (9/2--9/3)  - s/p IVP morphine 5mg q2 hours PRN severe pain (9/3-9/4)  - s/p IVP dilaudid 2mg q2 hours PRN severe pain per patient request (9/4-9/5)   - 9/5 increased dose of IVP dilaudid 2.5mg q2 hours PRN severe pain (9/5-)   - Continue folic acid 1mg daily  - PO Zofran PRN for opioid-induced nausea, PO Benadryl PRN for opioid-induced pruritus, Bowel regimen for opioid-induced constipation with DocuSenna 2tabs BID and Miralax daily      # Nodular lymphocyte predominant Hodgkins lymphoma (NLPHL)   - Primary Oncologist: Dr. Stoll  - Enlarged lymph nodes noted 4/1/22  - RUQ US (11/14/22) with mildly enlarged LNs in the region of the kavin hepatis  - MRI liver (11/16/22) showed re-demonstration of bulky retroperitoneal lymphadenopathy and kavin hepatic lymphadenopathy    - (11/18/22) lymph node biopsy showed atypical lymphoid infiltrate. Reviewed  by Hemepath board, insufficient for lymphoma diagnosis  - PET/CT (12/6/22) showing retroperitoneal lymphadenopathy  - Followed up with Dr. Stoll (12/16/22) with plan for surg/onc consult for large tissue bx but patient missed apt and was lost to follow up  - Requested new apt with Dr. Ronnie Marte 6/19/23, patient was not seen and lost to follow up  - CT a/p (11/28/23) increased size of retroperitoneal lymph nodes reflecting extramedullary hematopoiesis I/s/o sickle cell vs lymphoma  - Paraaortic LN bx via IR (11/30/23) consistent with NLPHL. Flow: no clonal B cell or T cell population identified, lymphocyte 95%, CD3+CD4+ 68%, CD3+CD8+ 7%, CD19+ 24%  - Elevated LDH/bili partially from sickle cell disease   - Chemotherapy (R-CHOP) was discussed with primary oncologist Dr. Stoll, and decision was made to simplify his chemotherapy to Rituxan and prednisone q3 weeks mainly due to frequent sickle cell crisis  - Current chemo regimen: rituxan and prednisone q3 weeks (C1 1/18/24, C2 2/8/24, Missed C3 d/t sickle cell pain crisis, C4 4/4/24, C5 5/16/24, C6 6/7/24)  - Per pt no longer taking Acyclovir 400mg BID prophy   - PET CT (7/25) overall showing great response to treatment with persistent residual viable disease involving a left common iliac node  - Last FUV with Dr. Stoll 7/25/24 rec RTC in 2 months (next FUV scheduled for 9/19)     # Choledocholithiasis  - s/p ERCP 7/18/24 with a biliary sphincterotomy where sludge and stones were removed, achieving complete clearance  - Seen by gen surg 8/8/24 Dr. Dove who rec lap cholecystectomy d/t the chance of recurrence of choledocholithiasis  - Surgery has yet to be scheduled, referral placed for scheduling   - Tbili 12.2 (9/3) --> 11.8 (9/4)--> 8.3 (9/5); Tbili baseline fluctuates ~5-13 which is markedly improved, recent peak at 52.8 prior to ERCP during recent hospital admission  - As of 9/5 patient without any abdominal pain, consider 24 hours of D5 1/2 v. RUQ US if  "rosalio continues to uptrend      # Hx of nocturnal oxygen dependence and hypoxia on room air  - Has not had home oxygen for 2-3 years   - Wean as able, encourage incentive spirometry  - Pulm FUV 8/8- \"Given his history of sickle cell disease, it is important to ensure he has formal sleep testing to look at desaturations and presence of sleep apnea despite not having a convincing history/body habitus typical for suspecting sleep apnea- patients with sickle cell have a higher prevalence of sleep disorders including nocturnal hypoxemia\"--> rec sleep referral for formal testing if deemed appropriate, ABG and O2 destat testing, and TTE with bubble study  - TTE 8/23 showing EF 60-65%, severely dilated LV and LA, + intrapulmonary shunting    - FU with Pulm outpatient (appt requested)     DVT prophy: Lovenox subcutaneous, SCDs, encourage ambulation     DISPO:  - Full code, confirmed on admit  - DC pending improvement in pain  - SUSIE Narayan (Parent) [phone] - updated at pt's bedside on 9/4   - urology 9/10, Sickle cell 9/18, Dr. Stoll (oncologist) 9/19, Pulm requested/pending        I spent 60 minutes in the professional and overall care of this patient.    Assessment and plan as above discussed with attending physician, Dr. Bren Stoll, PAKenzieC      "

## 2024-09-06 LAB
ALBUMIN SERPL BCP-MCNC: 4.3 G/DL (ref 3.4–5)
ALP SERPL-CCNC: 208 U/L (ref 33–120)
ALT SERPL W P-5'-P-CCNC: 63 U/L (ref 10–52)
ANION GAP SERPL CALC-SCNC: 12 MMOL/L (ref 10–20)
AST SERPL W P-5'-P-CCNC: 97 U/L (ref 9–39)
BASOPHILS # BLD AUTO: 0.05 X10*3/UL (ref 0–0.1)
BASOPHILS NFR BLD AUTO: 0.5 %
BILIRUB SERPL-MCNC: 7.9 MG/DL (ref 0–1.2)
BLOOD EXPIRATION DATE: NORMAL
BUN SERPL-MCNC: 7 MG/DL (ref 6–23)
CALCIUM SERPL-MCNC: 9.5 MG/DL (ref 8.6–10.6)
CHLORIDE SERPL-SCNC: 102 MMOL/L (ref 98–107)
CO2 SERPL-SCNC: 27 MMOL/L (ref 21–32)
CREAT SERPL-MCNC: 0.57 MG/DL (ref 0.5–1.3)
DISPENSE STATUS: NORMAL
EGFRCR SERPLBLD CKD-EPI 2021: >90 ML/MIN/1.73M*2
EOSINOPHIL # BLD AUTO: 0.43 X10*3/UL (ref 0–0.7)
EOSINOPHIL NFR BLD AUTO: 4.4 %
ERYTHROCYTE [DISTWIDTH] IN BLOOD BY AUTOMATED COUNT: 21.1 % (ref 11.5–14.5)
GLUCOSE SERPL-MCNC: 73 MG/DL (ref 74–99)
HCT VFR BLD AUTO: 23.3 % (ref 41–52)
HGB BLD-MCNC: 8.2 G/DL (ref 13.5–17.5)
HGB RETIC QN: 27 PG (ref 28–38)
IMM GRANULOCYTES # BLD AUTO: 0.2 X10*3/UL (ref 0–0.7)
IMM GRANULOCYTES NFR BLD AUTO: 2.1 % (ref 0–0.9)
IMMATURE RETIC FRACTION: 26.9 %
LDH SERPL L TO P-CCNC: 456 U/L (ref 84–246)
LYMPHOCYTES # BLD AUTO: 1.86 X10*3/UL (ref 1.2–4.8)
LYMPHOCYTES NFR BLD AUTO: 19.1 %
MCH RBC QN AUTO: 29.5 PG (ref 26–34)
MCHC RBC AUTO-ENTMCNC: 35.2 G/DL (ref 32–36)
MCV RBC AUTO: 84 FL (ref 80–100)
MONOCYTES # BLD AUTO: 1.25 X10*3/UL (ref 0.1–1)
MONOCYTES NFR BLD AUTO: 12.8 %
NEUTROPHILS # BLD AUTO: 5.96 X10*3/UL (ref 1.2–7.7)
NEUTROPHILS NFR BLD AUTO: 61.1 %
NRBC BLD-RTO: 1.2 /100 WBCS (ref 0–0)
PLATELET # BLD AUTO: 462 X10*3/UL (ref 150–450)
POLYCHROMASIA BLD QL SMEAR: NORMAL
POTASSIUM SERPL-SCNC: 4 MMOL/L (ref 3.5–5.3)
PRODUCT BLOOD TYPE: 1700
PRODUCT CODE: NORMAL
PROT SERPL-MCNC: 6.7 G/DL (ref 6.4–8.2)
RBC # BLD AUTO: 2.78 X10*6/UL (ref 4.5–5.9)
RBC MORPH BLD: NORMAL
RETICS #: 0.55 X10*6/UL (ref 0.02–0.12)
RETICS/RBC NFR AUTO: 19.9 % (ref 0.5–2)
SICKLE CELLS BLD QL SMEAR: NORMAL
SODIUM SERPL-SCNC: 137 MMOL/L (ref 136–145)
UNIT ABO: NORMAL
UNIT NUMBER: NORMAL
UNIT RH: NORMAL
UNIT VOLUME: 350
WBC # BLD AUTO: 9.8 X10*3/UL (ref 4.4–11.3)
XM INTEP: NORMAL

## 2024-09-06 PROCEDURE — 2500000001 HC RX 250 WO HCPCS SELF ADMINISTERED DRUGS (ALT 637 FOR MEDICARE OP)

## 2024-09-06 PROCEDURE — 1200000003 HC ONCOLOGY  ROOM WITH TELEMETRY DAILY

## 2024-09-06 PROCEDURE — 2500000002 HC RX 250 W HCPCS SELF ADMINISTERED DRUGS (ALT 637 FOR MEDICARE OP, ALT 636 FOR OP/ED)

## 2024-09-06 PROCEDURE — 99233 SBSQ HOSP IP/OBS HIGH 50: CPT

## 2024-09-06 PROCEDURE — RXMED WILLOW AMBULATORY MEDICATION CHARGE

## 2024-09-06 PROCEDURE — 80048 BASIC METABOLIC PNL TOTAL CA: CPT

## 2024-09-06 PROCEDURE — 85025 COMPLETE CBC W/AUTO DIFF WBC: CPT

## 2024-09-06 PROCEDURE — 83615 LACTATE (LD) (LDH) ENZYME: CPT

## 2024-09-06 PROCEDURE — 2500000005 HC RX 250 GENERAL PHARMACY W/O HCPCS

## 2024-09-06 PROCEDURE — 85045 AUTOMATED RETICULOCYTE COUNT: CPT

## 2024-09-06 PROCEDURE — 36415 COLL VENOUS BLD VENIPUNCTURE: CPT

## 2024-09-06 PROCEDURE — 2500000004 HC RX 250 GENERAL PHARMACY W/ HCPCS (ALT 636 FOR OP/ED)

## 2024-09-06 RX ORDER — OXYCODONE HYDROCHLORIDE 15 MG/1
15 TABLET ORAL EVERY 6 HOURS PRN
Start: 2024-09-06 | End: 2024-09-09 | Stop reason: SDUPTHER

## 2024-09-06 RX ORDER — PSEUDOEPHEDRINE HYDROCHLORIDE 60 MG/1
60 TABLET ORAL EVERY 8 HOURS PRN
Qty: 30 TABLET | Refills: 0 | Status: ON HOLD | OUTPATIENT
Start: 2024-09-06 | End: 2024-09-19

## 2024-09-06 RX ORDER — PANTOPRAZOLE SODIUM 20 MG/1
20 TABLET, DELAYED RELEASE ORAL
Qty: 30 TABLET | Refills: 11 | Status: ON HOLD | OUTPATIENT
Start: 2024-09-07 | End: 2025-09-07

## 2024-09-06 RX ORDER — KETOROLAC TROMETHAMINE 30 MG/ML
30 INJECTION, SOLUTION INTRAMUSCULAR; INTRAVENOUS EVERY 6 HOURS SCHEDULED
Status: COMPLETED | OUTPATIENT
Start: 2024-09-06 | End: 2024-09-09

## 2024-09-06 RX ORDER — GABAPENTIN 100 MG/1
100 CAPSULE ORAL EVERY 8 HOURS SCHEDULED
Qty: 90 CAPSULE | Refills: 11 | Status: ON HOLD | OUTPATIENT
Start: 2024-09-06 | End: 2025-09-06

## 2024-09-06 RX ORDER — PSEUDOEPHEDRINE HYDROCHLORIDE 60 MG/1
60 TABLET ORAL EVERY 8 HOURS PRN
Status: DISCONTINUED | OUTPATIENT
Start: 2024-09-06 | End: 2024-09-09 | Stop reason: HOSPADM

## 2024-09-06 RX ORDER — BACLOFEN 5 MG/1
5 TABLET ORAL 3 TIMES DAILY
Qty: 90 TABLET | Refills: 0 | Status: ON HOLD | OUTPATIENT
Start: 2024-09-06 | End: 2024-10-06

## 2024-09-06 RX ADMIN — GABAPENTIN 100 MG: 100 CAPSULE ORAL at 22:58

## 2024-09-06 RX ADMIN — PANTOPRAZOLE SODIUM 20 MG: 20 TABLET, DELAYED RELEASE ORAL at 06:36

## 2024-09-06 RX ADMIN — BACLOFEN 5 MG: 10 TABLET ORAL at 14:58

## 2024-09-06 RX ADMIN — Medication 2 L/MIN: at 21:35

## 2024-09-06 RX ADMIN — GABAPENTIN 100 MG: 100 CAPSULE ORAL at 13:11

## 2024-09-06 RX ADMIN — Medication 2 L/MIN: at 12:41

## 2024-09-06 RX ADMIN — KETOROLAC TROMETHAMINE 30 MG: 30 INJECTION, SOLUTION INTRAMUSCULAR; INTRAVENOUS at 10:59

## 2024-09-06 RX ADMIN — FOLIC ACID 1 MG: 1 TABLET ORAL at 08:51

## 2024-09-06 RX ADMIN — HYDROMORPHONE HYDROCHLORIDE 2.5 MG: 2 INJECTION, SOLUTION INTRAMUSCULAR; INTRAVENOUS; SUBCUTANEOUS at 08:52

## 2024-09-06 RX ADMIN — KETOROLAC TROMETHAMINE 30 MG: 30 INJECTION, SOLUTION INTRAMUSCULAR; INTRAVENOUS at 23:38

## 2024-09-06 RX ADMIN — HYDROMORPHONE HYDROCHLORIDE 2.5 MG: 2 INJECTION, SOLUTION INTRAMUSCULAR; INTRAVENOUS; SUBCUTANEOUS at 04:27

## 2024-09-06 RX ADMIN — PSEUDOEPHEDRINE HYDROCHLORIDE 60 MG: 60 TABLET ORAL at 02:32

## 2024-09-06 RX ADMIN — HYDROMORPHONE HYDROCHLORIDE 2.5 MG: 2 INJECTION, SOLUTION INTRAMUSCULAR; INTRAVENOUS; SUBCUTANEOUS at 15:23

## 2024-09-06 RX ADMIN — HYDROMORPHONE HYDROCHLORIDE 2.5 MG: 2 INJECTION, SOLUTION INTRAMUSCULAR; INTRAVENOUS; SUBCUTANEOUS at 10:58

## 2024-09-06 RX ADMIN — HYDROMORPHONE HYDROCHLORIDE 2.5 MG: 2 INJECTION, SOLUTION INTRAMUSCULAR; INTRAVENOUS; SUBCUTANEOUS at 02:29

## 2024-09-06 RX ADMIN — KETOROLAC TROMETHAMINE 30 MG: 30 INJECTION, SOLUTION INTRAMUSCULAR; INTRAVENOUS at 17:27

## 2024-09-06 RX ADMIN — HYDROMORPHONE HYDROCHLORIDE 2.5 MG: 2 INJECTION, SOLUTION INTRAMUSCULAR; INTRAVENOUS; SUBCUTANEOUS at 00:25

## 2024-09-06 RX ADMIN — GABAPENTIN 100 MG: 100 CAPSULE ORAL at 06:36

## 2024-09-06 RX ADMIN — HYDROMORPHONE HYDROCHLORIDE 2.5 MG: 2 INJECTION, SOLUTION INTRAMUSCULAR; INTRAVENOUS; SUBCUTANEOUS at 23:38

## 2024-09-06 RX ADMIN — HYDROMORPHONE HYDROCHLORIDE 2.5 MG: 2 INJECTION, SOLUTION INTRAMUSCULAR; INTRAVENOUS; SUBCUTANEOUS at 06:36

## 2024-09-06 RX ADMIN — BACLOFEN 5 MG: 10 TABLET ORAL at 08:51

## 2024-09-06 RX ADMIN — BACLOFEN 5 MG: 10 TABLET ORAL at 20:42

## 2024-09-06 RX ADMIN — HYDROMORPHONE HYDROCHLORIDE 2.5 MG: 2 INJECTION, SOLUTION INTRAMUSCULAR; INTRAVENOUS; SUBCUTANEOUS at 13:11

## 2024-09-06 RX ADMIN — HYDROMORPHONE HYDROCHLORIDE 2.5 MG: 2 INJECTION, SOLUTION INTRAMUSCULAR; INTRAVENOUS; SUBCUTANEOUS at 20:42

## 2024-09-06 RX ADMIN — HYDROMORPHONE HYDROCHLORIDE 2.5 MG: 2 INJECTION, SOLUTION INTRAMUSCULAR; INTRAVENOUS; SUBCUTANEOUS at 17:27

## 2024-09-06 ASSESSMENT — PAIN SCALES - GENERAL
PAINLEVEL_OUTOF10: 9

## 2024-09-06 ASSESSMENT — COGNITIVE AND FUNCTIONAL STATUS - GENERAL
STANDING UP FROM CHAIR USING ARMS: A LITTLE
HELP NEEDED FOR BATHING: A LITTLE
DRESSING REGULAR LOWER BODY CLOTHING: A LITTLE
TOILETING: A LITTLE
TURNING FROM BACK TO SIDE WHILE IN FLAT BAD: A LITTLE
DRESSING REGULAR UPPER BODY CLOTHING: A LITTLE
MOVING TO AND FROM BED TO CHAIR: A LITTLE
WALKING IN HOSPITAL ROOM: A LITTLE
PERSONAL GROOMING: A LITTLE

## 2024-09-06 ASSESSMENT — PAIN SCALES - PAIN ASSESSMENT IN ADVANCED DEMENTIA (PAINAD): TOTALSCORE: MEDICATION (SEE MAR)

## 2024-09-06 NOTE — PROGRESS NOTES
Music Therapy Note    Jeollen Narayan     Therapy Session  Referral Type: Referral from previous admission  Visit Type: Follow-up visit  Session Start Time: 1558  Session End Time: 1600  Intervention Delivery: In-person  Conflict of Service: None  Family Present for Session: None  Number of staff members present: 1               Treatment/Interventions       Post-assessment  Total Session Time (min): 2 minutes    Narrative  Assessment Detail: Patient being seen by CCLS at time of MT's arrival. MT checked in and notified pt that she can return at another time and pt voiced appreciation.  Follow-up: MT to continue to follow.    Education Documentation  No documentation found.

## 2024-09-06 NOTE — PROGRESS NOTES
Joellen Narayan is a 23 y.o. 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    Subjective   Seen at bedside, Denies N/V/D/C, chest pain, cough, shortness of breath.  Still having significant penile pain and tenderness, denies any priapism episodes. Discussed palliative vs chronic pain consult given this is likely residual ischemic pain.        Objective     Physical Exam  Constitutional:       General: He is not in acute distress.     Appearance: He is not toxic-appearing.   HENT:      Head: Normocephalic and atraumatic.   Eyes:      General: Scleral icterus present.      Extraocular Movements: Extraocular movements intact.   Cardiovascular:      Rate and Rhythm: Normal rate and regular rhythm.   Pulmonary:      Effort: No respiratory distress.   Abdominal:      General: Abdomen is flat.      Palpations: Abdomen is soft.      Tenderness: There is no abdominal tenderness.   Genitourinary:     Penis: Tenderness present.    Musculoskeletal:         General: No swelling or tenderness.   Skin:     Coloration: Skin is not jaundiced.      Findings: No bruising or erythema.   Neurological:      Mental Status: He is oriented to person, place, and time. Mental status is at baseline.         Last Recorded Vitals  Blood pressure 122/66, pulse 71, temperature 36 °C (96.8 °F), temperature source Temporal, resp. rate 16, SpO2 97%.  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 Attempted drainage in ED with Urology, patient unable to tolerate so was given ketamine. Priapism initially resolved without drainage upon ketamine infusion but returned a few hours later and he was taken to OR for drainage on 8/31. Admitted for further pain control, started on dilaudid IVP. Priapism continued on 9/1, requiring multiple doses of sudafed without resolution. Heme consulted 9/1 for RBCEx given persistent priapism, rec no RBCEx at this time, schedule sudafed q8h and added ibuprofen, baclofen 5mg TID and gabapentin 100mg TID. Penile doppler US (9/1) showing small caliber of bilateral cavernosal arteries with lack of color flow in portions of both arteries, more on the right, c/w low-flow/ischemic priapism. Urology notified, s/p phenylephrine injection with urology 9/1 evening with partial resolution of priapism, okay to advance diet 9/3 per urology as not planning surgical intervention at this time. Patient started on morphine PCA on 9/2 for uncontrolled pain, 9/3 start IVP morphine, 9/4 changed IVP morphine to IVP dilaudid due to continued pain. Given high residual pain that is only minimally responding to opioids, chronic pain consulted 9/6 for further recs. DC home pending further control of pain.      # Priapism  - frequent episodes within the last month, has had 3 ED visitis with same complaint  - Pt missed urology FUV 7/2, new appt 10/7 (will try for sooner appt)  - 8/31 Presented to the ED with priapism >14hrs  - Urology consulted and attempted drainage in ED, patient unable to tolerate so was given ketamine; Priapism initially resolved without drainage upon ketamine infusion but returned a few hours later and he was taken to OR for drainage  - s/p OR 8/31 with urology for priapism drainage, penile block, and corpora cavernosa irrigation  - priapism continued on 9/1, requiring multiple doses of sudafed  without resolution  - urology consulted and s/p phenylephrine injection with urology 9/1 evening with partial resolution of priapism  - Heme consulted 9/1 for RBCEx given persistent priapism, rec no RBCEx at this time, schedule sudafed q8h and add ibuprofen  - penile doppler US (9/1) showing small caliber of bilateral cavernosal arteries with lack of color flow in portions of both arteries, more on the right, c/w low-flow/ischemic priapism   - Per urology kept patient npo for possible OR for penile shunt 9/1-9/2, urology has no plans for penile shunt at this time   - 9/4 re-engaged urology for continued severe pain and pt. Requesting additional phenylepinephrine injection. Per urology, no indication at this time and recommended further pain control   - s/p dPCA for pain control; initially 0.2mg demand q10min with 1 hr max of 1mg and then later increased to 0.4mg demand q6min with 1hr max of 2.5mg   - s/p morphine PCA 9/2 as dilaudid did not seem to be helping pain; 2mg demand dose q10 min with 1 hr max of 12mg (9/2--9/3)  - s/p  IVP morphine 5mg q2 hours PRN severe pain (9/3-9/4)  - s/p IVP dilaudid 2mg q2 hours PRN severe pain per patient request (9/4-9/5)   - 9/5 increased dose of IVP dilaudid 2.5mg q2 hours PRN severe pain (9/5-)   - 9/6 added scheduled toradol 30mg IVP q6h for further pain control   - s/p scheduled sudafed q8 hours (9/1--9/6), changed back to PRN 9/6 in prep for DC   - added baclofen 5mg TID and gabapentin 100mg TID; per urology, some small studies that suggest potential role in reducing recurrent priapism   - chronic pain consulted 9/6 given high residual pain, recs pending      # Hgb SS with severe pain  - OARRS reviewed, no aberrancies  - No current care path  - Hgb 6.6 on 9/5 (baseline ~8.5); s/p 1 unit PRBC on 9/5   - Tbili 12.2 (9/3)-->11.8 (9/4)--> 8.3 (9/5), Tbili baseline fluctuates ~5-13  - up trending WBC (18.3) on 9/3 as of 9/5 continuing to downtrend (14.4), no s/sx of infection  likely reactive in setting of sickle cell, continue to monitor  - Hgb S (8/31): 75.5%   - s/p d IV dilaudid 2mg q2hrs PRN severe pain (8/31-9/1)  - s/p dPCA for pain control; initially 0.2mg demand q10min with 1 hr max of 1mg and then later increased to 0.4mg demand q6min with 1hr max of 2.5mg   - s/p morphine PCA 9/2 as dilaudid did not seem to be helping pain; 2mg demand dose q10 min with 1 hr max of 12mg (9/2--9/3)  - s/p IVP morphine 5mg q2 hours PRN severe pain (9/3-9/4)  - s/p IVP dilaudid 2mg q2 hours PRN severe pain per patient request (9/4-9/5)   - 9/5 increased dose of IVP dilaudid 2.5mg q2 hours PRN severe pain (9/5-    )   - Continue folic acid 1mg daily  - PO Zofran PRN for opioid-induced nausea, PO Benadryl PRN for opioid-induced pruritus, Bowel regimen for opioid-induced constipation with DocuSenna 2tabs BID and Miralax daily      # Nodular lymphocyte predominant Hodgkins lymphoma (NLPHL)   - Primary Oncologist: Dr. Stoll  - Enlarged lymph nodes noted 4/1/22  - RUQ US (11/14/22) with mildly enlarged LNs in the region of the kavin hepatis  - MRI liver (11/16/22) showed re-demonstration of bulky retroperitoneal lymphadenopathy and kavin hepatic lymphadenopathy    - (11/18/22) lymph node biopsy showed atypical lymphoid infiltrate. Reviewed by Hemepath board, insufficient for lymphoma diagnosis  - PET/CT (12/6/22) showing retroperitoneal lymphadenopathy  - Followed up with Dr. Stoll (12/16/22) with plan for surg/onc consult for large tissue bx but patient missed apt and was lost to follow up  - Requested new apt with Dr. Ronnie Marte 6/19/23, patient was not seen and lost to follow up  - CT a/p (11/28/23) increased size of retroperitoneal lymph nodes reflecting extramedullary hematopoiesis I/s/o sickle cell vs lymphoma  - Paraaortic LN bx via IR (11/30/23) consistent with NLPHL. Flow: no clonal B cell or T cell population identified, lymphocyte 95%, CD3+CD4+ 68%, CD3+CD8+ 7%, CD19+ 24%  - Elevated  "LDH/bili partially from sickle cell disease   - Chemotherapy (R-CHOP) was discussed with primary oncologist Dr. Stoll, and decision was made to simplify his chemotherapy to Rituxan and prednisone q3 weeks mainly due to frequent sickle cell crisis  - Current chemo regimen: rituxan and prednisone q3 weeks (C1 1/18/24, C2 2/8/24, Missed C3 d/t sickle cell pain crisis, C4 4/4/24, C5 5/16/24, C6 6/7/24)  - Per pt no longer taking Acyclovir 400mg BID prophy   - PET CT (7/25) overall showing great response to treatment with persistent residual viable disease involving a left common iliac node  - Last FUV with Dr. Stoll 7/25/24 rec RTC in 2 months (next FUV scheduled for 9/19)     # Choledocholithiasis  - s/p ERCP 7/18/24 with a biliary sphincterotomy where sludge and stones were removed, achieving complete clearance  - Seen by gen surg 8/8/24 Dr. Dove who rec lap cholecystectomy d/t the chance of recurrence of choledocholithiasis  - Surgery has yet to be scheduled, referral placed for scheduling   - Tbili 12.2 (9/3) --> 11.8 (9/4)--> 8.3 (9/5); Tbili baseline fluctuates ~5-13 which is markedly improved, recent peak at 52.8 prior to ERCP during recent hospital admission  - As of 9/5 patient without any abdominal pain, consider 24 hours of D5 1/2 v. RUQ US if tbili continues to uptrend      # Hx of nocturnal oxygen dependence and hypoxia on room air  - Has not had home oxygen for 2-3 years   - Wean as able, encourage incentive spirometry  - Pulm FUV 8/8- \"Given his history of sickle cell disease, it is important to ensure he has formal sleep testing to look at desaturations and presence of sleep apnea despite not having a convincing history/body habitus typical for suspecting sleep apnea- patients with sickle cell have a higher prevalence of sleep disorders including nocturnal hypoxemia\"--> rec sleep referral for formal testing if deemed appropriate, ABG and O2 destat testing, and TTE with bubble study  - TTE 8/23 " showing EF 60-65%, severely dilated LV and LA, + intrapulmonary shunting    - FU with Pulm outpatient (appt requested)     DVT prophy: Lovenox subcutaneous, SCDs, encourage ambulation     DISPO:  - Full code, confirmed on admit  - DC pending improvement in pain  - SUSIE Narayan (Parent) [phone] - updated at pt's bedside on 9/4   - urology 9/10, Sickle cell 9/18, Dr. Stoll (oncologist) 9/19, Pulm requested/pending        I spent >60 minutes in the professional and overall care of this patient.      Mary Moody, APRN-CNP

## 2024-09-06 NOTE — CARE PLAN
The patient's goals for the shift include pain control    The clinical goals for the shift include pt will increased daily activity    Problem: Pain  Goal: Performs ADL's with improved pain control throughout shift  Outcome: Progressing     Problem: Pain  Goal: Participates in PT with improved pain control throughout the shift  Outcome: Progressing

## 2024-09-06 NOTE — PROGRESS NOTES
Spiritual Care Visit    Clinical Encounter Type  Visited With: Patient  Routine Visit: Follow-up  Continue Visiting: Yes     followed up with patient Joellen Narayan. Patient consented to a visit with facility Oliva rowley. Proper hand hygiene with  was utilized before and after visit, and a clean sheet was placed on patient's bed for facility dog to visit. Patient shared that this has been a difficult admission, but he is hopeful that a different medication starting today will help ease the pain he's been experiencing. Patient shared that otherwise, when he is feeling better, he's discovered a jazz club that he enjoys along with spending time with friends. He shared he is trying to figure out future plans for himself and remains hopeful and in good spirits.  provided care through reflective listening and supportive conversation. Patient was appreciative of visit and did not have any needs at this time. Spiritual Care remains available as needed/requested.    Rev. Diana Álvarez MDiv, [facility]

## 2024-09-07 LAB
ALBUMIN SERPL BCP-MCNC: 3.7 G/DL (ref 3.4–5)
ALP SERPL-CCNC: 183 U/L (ref 33–120)
ALT SERPL W P-5'-P-CCNC: 67 U/L (ref 10–52)
ANION GAP SERPL CALC-SCNC: 13 MMOL/L (ref 10–20)
AST SERPL W P-5'-P-CCNC: 83 U/L (ref 9–39)
BASO STIPL BLD QL SMEAR: PRESENT
BASOPHILS # BLD AUTO: 0.03 X10*3/UL (ref 0–0.1)
BASOPHILS NFR BLD AUTO: 0.4 %
BILIRUB SERPL-MCNC: 6.4 MG/DL (ref 0–1.2)
BUN SERPL-MCNC: 6 MG/DL (ref 6–23)
CALCIUM SERPL-MCNC: 9.1 MG/DL (ref 8.6–10.6)
CHLORIDE SERPL-SCNC: 106 MMOL/L (ref 98–107)
CO2 SERPL-SCNC: 25 MMOL/L (ref 21–32)
CREAT SERPL-MCNC: 0.46 MG/DL (ref 0.5–1.3)
EGFRCR SERPLBLD CKD-EPI 2021: >90 ML/MIN/1.73M*2
EOSINOPHIL # BLD AUTO: 0.4 X10*3/UL (ref 0–0.7)
EOSINOPHIL NFR BLD AUTO: 5.1 %
ERYTHROCYTE [DISTWIDTH] IN BLOOD BY AUTOMATED COUNT: 22.2 % (ref 11.5–14.5)
GLUCOSE SERPL-MCNC: 84 MG/DL (ref 74–99)
HCT VFR BLD AUTO: 20.8 % (ref 41–52)
HGB BLD-MCNC: 7.3 G/DL (ref 13.5–17.5)
HGB RETIC QN: 30 PG (ref 28–38)
IMM GRANULOCYTES # BLD AUTO: 0.19 X10*3/UL (ref 0–0.7)
IMM GRANULOCYTES NFR BLD AUTO: 2.4 % (ref 0–0.9)
IMMATURE RETIC FRACTION: 26.1 %
LDH SERPL L TO P-CCNC: 368 U/L (ref 84–246)
LYMPHOCYTES # BLD AUTO: 1.46 X10*3/UL (ref 1.2–4.8)
LYMPHOCYTES NFR BLD AUTO: 18.7 %
MCH RBC QN AUTO: 29.1 PG (ref 26–34)
MCHC RBC AUTO-ENTMCNC: 35.1 G/DL (ref 32–36)
MCV RBC AUTO: 83 FL (ref 80–100)
MONOCYTES # BLD AUTO: 1.07 X10*3/UL (ref 0.1–1)
MONOCYTES NFR BLD AUTO: 13.7 %
NEUTROPHILS # BLD AUTO: 4.66 X10*3/UL (ref 1.2–7.7)
NEUTROPHILS NFR BLD AUTO: 59.7 %
NRBC BLD-RTO: 1.5 /100 WBCS (ref 0–0)
PLATELET # BLD AUTO: 472 X10*3/UL (ref 150–450)
POLYCHROMASIA BLD QL SMEAR: NORMAL
POTASSIUM SERPL-SCNC: 4.1 MMOL/L (ref 3.5–5.3)
PROT SERPL-MCNC: 6.1 G/DL (ref 6.4–8.2)
RBC # BLD AUTO: 2.51 X10*6/UL (ref 4.5–5.9)
RBC MORPH BLD: NORMAL
RETICS #: 0.54 X10*6/UL (ref 0.02–0.12)
RETICS/RBC NFR AUTO: 21.6 % (ref 0.5–2)
SICKLE CELLS BLD QL SMEAR: NORMAL
SODIUM SERPL-SCNC: 140 MMOL/L (ref 136–145)
TARGETS BLD QL SMEAR: NORMAL
WBC # BLD AUTO: 7.8 X10*3/UL (ref 4.4–11.3)

## 2024-09-07 PROCEDURE — 2500000001 HC RX 250 WO HCPCS SELF ADMINISTERED DRUGS (ALT 637 FOR MEDICARE OP)

## 2024-09-07 PROCEDURE — 2500000004 HC RX 250 GENERAL PHARMACY W/ HCPCS (ALT 636 FOR OP/ED)

## 2024-09-07 PROCEDURE — 1200000003 HC ONCOLOGY  ROOM WITH TELEMETRY DAILY

## 2024-09-07 PROCEDURE — 2500000002 HC RX 250 W HCPCS SELF ADMINISTERED DRUGS (ALT 637 FOR MEDICARE OP, ALT 636 FOR OP/ED)

## 2024-09-07 PROCEDURE — 2500000005 HC RX 250 GENERAL PHARMACY W/O HCPCS

## 2024-09-07 PROCEDURE — 84075 ASSAY ALKALINE PHOSPHATASE: CPT

## 2024-09-07 PROCEDURE — 85025 COMPLETE CBC W/AUTO DIFF WBC: CPT

## 2024-09-07 PROCEDURE — 36415 COLL VENOUS BLD VENIPUNCTURE: CPT

## 2024-09-07 PROCEDURE — 99233 SBSQ HOSP IP/OBS HIGH 50: CPT | Performed by: PHYSICIAN ASSISTANT

## 2024-09-07 PROCEDURE — 83615 LACTATE (LD) (LDH) ENZYME: CPT

## 2024-09-07 PROCEDURE — 85045 AUTOMATED RETICULOCYTE COUNT: CPT

## 2024-09-07 RX ADMIN — KETOROLAC TROMETHAMINE 30 MG: 30 INJECTION, SOLUTION INTRAMUSCULAR; INTRAVENOUS at 12:04

## 2024-09-07 RX ADMIN — PSEUDOEPHEDRINE HYDROCHLORIDE 60 MG: 60 TABLET ORAL at 14:27

## 2024-09-07 RX ADMIN — GABAPENTIN 100 MG: 100 CAPSULE ORAL at 21:44

## 2024-09-07 RX ADMIN — HYDROMORPHONE HYDROCHLORIDE 2.5 MG: 2 INJECTION, SOLUTION INTRAMUSCULAR; INTRAVENOUS; SUBCUTANEOUS at 21:46

## 2024-09-07 RX ADMIN — KETOROLAC TROMETHAMINE 30 MG: 30 INJECTION, SOLUTION INTRAMUSCULAR; INTRAVENOUS at 05:42

## 2024-09-07 RX ADMIN — KETOROLAC TROMETHAMINE 30 MG: 30 INJECTION, SOLUTION INTRAMUSCULAR; INTRAVENOUS at 17:00

## 2024-09-07 RX ADMIN — HYDROMORPHONE HYDROCHLORIDE 2.5 MG: 2 INJECTION, SOLUTION INTRAMUSCULAR; INTRAVENOUS; SUBCUTANEOUS at 19:24

## 2024-09-07 RX ADMIN — GABAPENTIN 100 MG: 100 CAPSULE ORAL at 14:27

## 2024-09-07 RX ADMIN — BACLOFEN 5 MG: 10 TABLET ORAL at 08:10

## 2024-09-07 RX ADMIN — BACLOFEN 5 MG: 10 TABLET ORAL at 20:43

## 2024-09-07 RX ADMIN — HYDROMORPHONE HYDROCHLORIDE 2.5 MG: 2 INJECTION, SOLUTION INTRAMUSCULAR; INTRAVENOUS; SUBCUTANEOUS at 12:04

## 2024-09-07 RX ADMIN — HYDROMORPHONE HYDROCHLORIDE 2.5 MG: 2 INJECTION, SOLUTION INTRAMUSCULAR; INTRAVENOUS; SUBCUTANEOUS at 16:59

## 2024-09-07 RX ADMIN — HYDROMORPHONE HYDROCHLORIDE 2.5 MG: 2 INJECTION, SOLUTION INTRAMUSCULAR; INTRAVENOUS; SUBCUTANEOUS at 23:59

## 2024-09-07 RX ADMIN — HYDROMORPHONE HYDROCHLORIDE 2.5 MG: 2 INJECTION, SOLUTION INTRAMUSCULAR; INTRAVENOUS; SUBCUTANEOUS at 08:10

## 2024-09-07 RX ADMIN — HYDROMORPHONE HYDROCHLORIDE 2.5 MG: 2 INJECTION, SOLUTION INTRAMUSCULAR; INTRAVENOUS; SUBCUTANEOUS at 02:28

## 2024-09-07 RX ADMIN — PANTOPRAZOLE SODIUM 20 MG: 20 TABLET, DELAYED RELEASE ORAL at 05:56

## 2024-09-07 RX ADMIN — KETOROLAC TROMETHAMINE 30 MG: 30 INJECTION, SOLUTION INTRAMUSCULAR; INTRAVENOUS at 23:59

## 2024-09-07 RX ADMIN — BACLOFEN 5 MG: 10 TABLET ORAL at 14:27

## 2024-09-07 RX ADMIN — GABAPENTIN 100 MG: 100 CAPSULE ORAL at 05:42

## 2024-09-07 RX ADMIN — FOLIC ACID 1 MG: 1 TABLET ORAL at 08:10

## 2024-09-07 RX ADMIN — Medication 2 L/MIN: at 20:39

## 2024-09-07 RX ADMIN — HYDROMORPHONE HYDROCHLORIDE 2.5 MG: 2 INJECTION, SOLUTION INTRAMUSCULAR; INTRAVENOUS; SUBCUTANEOUS at 05:42

## 2024-09-07 RX ADMIN — HYDROMORPHONE HYDROCHLORIDE 2.5 MG: 2 INJECTION, SOLUTION INTRAMUSCULAR; INTRAVENOUS; SUBCUTANEOUS at 14:27

## 2024-09-07 ASSESSMENT — PAIN SCALES - GENERAL
PAINLEVEL_OUTOF10: 9
PAINLEVEL_OUTOF10: 9
PAINLEVEL_OUTOF10: 8
PAINLEVEL_OUTOF10: 8
PAINLEVEL_OUTOF10: 9
PAINLEVEL_OUTOF10: 8
PAINLEVEL_OUTOF10: 7
PAINLEVEL_OUTOF10: 9

## 2024-09-07 ASSESSMENT — PAIN - FUNCTIONAL ASSESSMENT: PAIN_FUNCTIONAL_ASSESSMENT: 0-10

## 2024-09-07 ASSESSMENT — PAIN DESCRIPTION - LOCATION
LOCATION: GROIN
LOCATION: GROIN

## 2024-09-07 NOTE — PROGRESS NOTES
[facility]  Daily Progress Note    Interval History/Overnight Events:   - called to evaluate penis for priapism  - still able to bend penis  - pain still slightly present but not worsening  - firmness stable    Medications:    Current Facility-Administered Medications   Medication Dose Route Frequency Provider Last Rate Last Admin    baclofen (Lioresal) tablet 5 mg  5 mg oral TID Mary Moody APRN-CNP   5 mg at 09/07/24 1427    diphenhydrAMINE (BENADryl) capsule 25 mg  25 mg oral q6h PRN Mary Moody, APRN-CNP        docusate sodium (Colace) capsule 100 mg  100 mg oral BID PRN Mary REMI Moody, APRN-CNP   100 mg at 09/04/24 2011    Or    polyethylene glycol (Glycolax, Miralax) packet 17 g  17 g oral BID PRN Mary Moody, APRN-CNP        enoxaparin (Lovenox) syringe 40 mg  40 mg subcutaneous q24h Mary Moody, APRN-CNP   40 mg at 08/31/24 2058    folic acid (Folvite) tablet 1 mg  1 mg oral Daily Mary REMI Moody, APRN-CNP   1 mg at 09/07/24 0810    gabapentin (Neurontin) capsule 100 mg  100 mg oral q8h [facility] REMI Moody, APRN-CNP   100 mg at 09/07/24 1427    HYDROmorphone PF (Dilaudid) injection 2.5 mg  2.5 mg intravenous q2h PRN Emilee Stoll PA-C   2.5 mg at 09/07/24 1427    ibuprofen tablet 200 mg  200 mg oral q8h PRN Mary REMI Moody, APRN-CNP   200 mg at 09/03/24 0314    ketorolac (Toradol) injection 30 mg  30 mg intravenous q6h [facility] Mary Moody, APRN-CNP   30 mg at 09/07/24 1204    naloxone (Narcan) injection 0.2 mg  0.2 mg intravenous q5 min PRN Mary REMI Moody, APRN-CNP        naloxone (Narcan) injection 0.2 mg  0.2 mg intravenous PRN Mary REMI Moody, APRN-CNP        naloxone (Narcan) injection 0.2 mg  0.2 mg intravenous PRN Mary REMI Moody, APRN-CNP        ondansetron ODT (Zofran-ODT) disintegrating tablet 8 mg  8 mg oral q8h PRN RAVI Velazco        oxygen (O2) therapy   inhalation Continuous - Inhalation RAVI Velazco   2 L/min at 09/06/24 5419    pantoprazole (ProtoNix) EC tablet 20 mg  20 mg  oral Daily before breakfast Mary MoodyRAAD-CNP   20 mg at 09/07/24 0556    pseudoephedrine (Sudafed) tablet 60 mg  60 mg oral q8h PRN Mary Moody RAAD-CNP   60 mg at 09/07/24 1427       Objective    Vitals:    09/06/24 2109 09/07/24 0250 09/07/24 0919 09/07/24 1315   BP: 113/66 120/61 119/62 112/67   BP Location: Right arm  Right arm Right arm   Patient Position: Sitting  Lying Sitting   Pulse: 69 69 59 78   Resp: 16 18 18 18   Temp: 35.7 °C (96.3 °F) 36.7 °C (98.1 °F) 36.4 °C (97.5 °F) 36.5 °C (97.7 °F)   TempSrc: Temporal Temporal Temporal Temporal   SpO2: 95% 93% 96% 94%     09/05 1900 - 09/07 0659  In: 1521 [P.O.:1521]  Out: 1925 [Urine:1925]    Physical Exam       General: resting comfortably   Neuro: alert and oriented, no obvious focal deficit   Head/Neck: normocephalic, atraumatic  ENT: moist mucous membranes  CV: Intermittently tachycardic, currently regular rate  Pulm: nonlabored breathing on room air, no conversational dyspnea  Abd: soft, nondistended, nontender  : penis, firm but still bendable  MSK: RAMIREZ, no gross deformities  Psych: mood and behavior normal      Labs    Lab Results   Component Value Date    WBC 7.8 09/07/2024    HGB 7.3 (L) 09/07/2024    HCT 20.8 (L) 09/07/2024    MCV 83 09/07/2024     (H) 09/07/2024      Lab Results   Component Value Date    GLUCOSE 84 09/07/2024    CALCIUM 9.1 09/07/2024     09/07/2024    K 4.1 09/07/2024    CO2 25 09/07/2024     09/07/2024    BUN 6 09/07/2024    CREATININE 0.46 (L) 09/07/2024        Imaging  Doppler US  Doppler evaluation:  There is a small caliber of bilateral cavernosal arteries.  Flow is noted within the deep and superficial dorsal veins.  Flow is noted within the dorsal arteries bilaterally.  The resistive indices in the right and left cavernosal arteries are  0.76 and 0.9 respectively. The velocities within the right and left  cavernosal arteries are 8.3 cm/sec and 9.1 cm/sec respectively.      IMPRESSION:  *Small  caliber of bilateral cavernosal arteries with lack of color  flow in portions of both arteries, more on the right. Additionally  there is markedly low peak systolic velocities and increased  resistive indices on Doppler interrogation of the visualized  portions. Findings in the setting of sickle cell disease are  compatible with low-flow/ischemic priapism. 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 He underwent priapism drainage in the OR 8/31. He has recurrence of ischemic priapism yesterday confirmed on doppler ultrasound. 9/1 phenlyelphrine injection (500mcg) was administered into the cavernosa at bedside. 9/2, he had partial detumescence. His penis is firm but bendable.     Called regarding penile appearance 9/7,  Still firm but bendable.  No increased pain.    - will continue management as prior  - maintain outpatient appt, able to discharge with pain control    Dw chief Dr. Jethro Lobato MD   Adult Urology Pager: [phone]  Pediatric Urology Pager: [phone]

## 2024-09-07 NOTE — CARE PLAN
The patient's goals for the shift include pain control    The clinical goals for the shift include pt will increased daily activity

## 2024-09-07 NOTE — PROGRESS NOTES
Joellen Narayan is a 23 y.o. 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    Subjective   Seen at bedside, still having penile pain, we discussed having chronic pain see him, pain has improved from yesterday.  Denies N/V/D/C, chest pain, cough, shortness of breath.      Objective     Physical Exam  Constitutional:       General: He is not in acute distress.     Appearance: He is not toxic-appearing.   HENT:      Head: Normocephalic and atraumatic.   Eyes:      General: Scleral icterus present.      Extraocular Movements: Extraocular movements intact.   Cardiovascular:      Rate and Rhythm: Normal rate and regular rhythm.   Pulmonary:      Effort: No respiratory distress.   Abdominal:      General: Abdomen is flat.      Palpations: Abdomen is soft.      Tenderness: There is no abdominal tenderness.   Genitourinary:     Penis: Tenderness present.    Musculoskeletal:         General: No swelling or tenderness.   Skin:     Coloration: Skin is not jaundiced.      Findings: No bruising or erythema.   Neurological:      Mental Status: He is oriented to person, place, and time. Mental status is at baseline.         Last Recorded Vitals  Blood pressure 112/67, pulse 78, temperature 36.5 °C (97.7 °F), temperature source Temporal, resp. rate 18, SpO2 94%.  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 Attempted drainage in ED with Urology, patient unable to tolerate so was given ketamine. Priapism initially resolved without drainage upon ketamine infusion but returned a few hours later and he was taken to OR for drainage on 8/31. Admitted for further pain control, started on dilaudid IVP. Priapism continued on 9/1, requiring multiple doses of sudafed without resolution. Heme consulted 9/1 for RBCEx given persistent priapism, rec no RBCEx at this time, schedule sudafed q8h and added ibuprofen, baclofen 5mg TID and gabapentin 100mg TID. Penile doppler US (9/1) showing small caliber of bilateral cavernosal arteries with lack of color flow in portions of both arteries, more on the right, c/w low-flow/ischemic priapism. Urology notified, s/p phenylephrine injection with urology 9/1 evening with partial resolution of priapism, okay to advance diet 9/3 per urology as not planning surgical intervention at this time. Patient started on morphine PCA on 9/2 for uncontrolled pain, 9/3 start IVP morphine, 9/4 changed IVP morphine to IVP dilaudid due to continued pain. Given high residual pain that is only minimally responding to opioids, chronic pain consulted 9/6 for further recs. DC home pending further control of pain.      # Priapism  - frequent episodes within the last month, has had 3 ED visitis with same complaint  - Pt missed urology FUV 7/2, new appt 10/7 (will try for sooner appt)  - 8/31 Presented to the ED with priapism >14hrs  - Urology consulted and attempted drainage in ED, patient unable to tolerate so was given ketamine; Priapism initially resolved without drainage upon ketamine infusion but returned a few hours later and he was taken to OR for drainage  - s/p OR 8/31 with urology for priapism drainage, penile block, and corpora cavernosa irrigation  - priapism continued on 9/1, requiring multiple doses of sudafed without resolution  - urology consulted and s/p phenylephrine  injection with urology 9/1 evening with partial resolution of priapism  - Heme consulted 9/1 for RBCEx given persistent priapism, rec no RBCEx at this time, schedule sudafed q8h and add ibuprofen  - penile doppler US (9/1) showing small caliber of bilateral cavernosal arteries with lack of color flow in portions of both arteries, more on the right, c/w low-flow/ischemic priapism   - Per urology kept patient npo for possible OR for penile shunt 9/1-9/2, urology has no plans for penile shunt at this time   - 9/4 re-engaged urology for continued severe pain and pt. Requesting additional phenylepinephrine injection. Per urology, no indication at this time and recommended further pain control   - s/p dPCA for pain control; initially 0.2mg demand q10min with 1 hr max of 1mg and then later increased to 0.4mg demand q6min with 1hr max of 2.5mg   - s/p morphine PCA 9/2 as dilaudid did not seem to be helping pain; 2mg demand dose q10 min with 1 hr max of 12mg (9/2--9/3)  - s/p  IVP morphine 5mg q2 hours PRN severe pain (9/3-9/4)  - s/p IVP dilaudid 2mg q2 hours PRN severe pain per patient request (9/4-9/5)   - 9/5 increased dose of IVP dilaudid 2.5mg q2 hours PRN severe pain (9/5-)   - 9/6 added scheduled toradol 30mg IVP q6h for further pain control   - s/p scheduled sudafed q8 hours (9/1--9/6), changed back to PRN 9/6 in prep for DC   - added baclofen 5mg TID and gabapentin 100mg TID; per urology, some small studies that suggest potential role in reducing recurrent priapism   - chronic pain consulted 9/6 given high residual pain, recs pending      # Hgb SS with severe pain  - OARRS reviewed, no aberrancies  - No current care path  - Hgb 6.6 on 9/5 (baseline ~8.5); s/p 1 unit PRBC on 9/5   - Tbili 12.2 (9/3)-->11.8 (9/4)--> 8.3 (9/5), Tbili baseline fluctuates ~5-13  - up trending WBC (18.3) on 9/3 as of 9/5 continuing to downtrend (14.4), no s/sx of infection likely reactive in setting of sickle cell, continue to monitor  -  Hgb S (8/31): 75.5%   - s/p d IV dilaudid 2mg q2hrs PRN severe pain (8/31-9/1)  - s/p dPCA for pain control; initially 0.2mg demand q10min with 1 hr max of 1mg and then later increased to 0.4mg demand q6min with 1hr max of 2.5mg   - s/p morphine PCA 9/2 as dilaudid did not seem to be helping pain; 2mg demand dose q10 min with 1 hr max of 12mg (9/2--9/3)  - s/p IVP morphine 5mg q2 hours PRN severe pain (9/3-9/4)  - s/p IVP dilaudid 2mg q2 hours PRN severe pain per patient request (9/4-9/5)   - 9/5 increased dose of IVP dilaudid 2.5mg q2 hours PRN severe pain (9/5-), will cont current regime for today 9/7   - Continue folic acid 1mg daily  - PO Zofran PRN for opioid-induced nausea, PO Benadryl PRN for opioid-induced pruritus, Bowel regimen for opioid-induced constipation with DocuSenna 2tabs BID and Miralax daily      # Nodular lymphocyte predominant Hodgkins lymphoma (NLPHL)   - Primary Oncologist: Dr. Stoll  - Enlarged lymph nodes noted 4/1/22  - RUQ US (11/14/22) with mildly enlarged LNs in the region of the kavin hepatis  - MRI liver (11/16/22) showed re-demonstration of bulky retroperitoneal lymphadenopathy and kavin hepatic lymphadenopathy    - (11/18/22) lymph node biopsy showed atypical lymphoid infiltrate. Reviewed by Hemepath board, insufficient for lymphoma diagnosis  - PET/CT (12/6/22) showing retroperitoneal lymphadenopathy  - Followed up with Dr. Stoll (12/16/22) with plan for surg/onc consult for large tissue bx but patient missed apt and was lost to follow up  - Requested new apt with Dr. Ronnie Marte 6/19/23, patient was not seen and lost to follow up  - CT a/p (11/28/23) increased size of retroperitoneal lymph nodes reflecting extramedullary hematopoiesis I/s/o sickle cell vs lymphoma  - Paraaortic LN bx via IR (11/30/23) consistent with NLPHL. Flow: no clonal B cell or T cell population identified, lymphocyte 95%, CD3+CD4+ 68%, CD3+CD8+ 7%, CD19+ 24%  - Elevated LDH/bili partially from sickle cell  "disease   - Chemotherapy (R-CHOP) was discussed with primary oncologist Dr. Stoll, and decision was made to simplify his chemotherapy to Rituxan and prednisone q3 weeks mainly due to frequent sickle cell crisis  - Current chemo regimen: rituxan and prednisone q3 weeks (C1 1/18/24, C2 2/8/24, Missed C3 d/t sickle cell pain crisis, C4 4/4/24, C5 5/16/24, C6 6/7/24)  - Per pt no longer taking Acyclovir 400mg BID prophy   - PET CT (7/25) overall showing great response to treatment with persistent residual viable disease involving a left common iliac node  - Last FUV with Dr. Stoll 7/25/24 rec RTC in 2 months (next FUV scheduled for 9/19)     # Choledocholithiasis  - s/p ERCP 7/18/24 with a biliary sphincterotomy where sludge and stones were removed, achieving complete clearance  - Seen by gen surg 8/8/24 Dr. Dove who rec lap cholecystectomy d/t the chance of recurrence of choledocholithiasis  - Surgery has yet to be scheduled, referral placed for scheduling   - Tbili 12.2 (9/3) --> 11.8 (9/4)--> 8.3 (9/5); Tbili baseline fluctuates ~5-13 which is markedly improved, recent peak at 52.8 prior to ERCP during recent hospital admission  - As of 9/5 patient without any abdominal pain, consider 24 hours of D5 1/2 v. RUQ US if tbili continues to uptrend      # Hx of nocturnal oxygen dependence and hypoxia on room air  - Has not had home oxygen for 2-3 years   - Wean as able, encourage incentive spirometry  - Pulm FUV 8/8- \"Given his history of sickle cell disease, it is important to ensure he has formal sleep testing to look at desaturations and presence of sleep apnea despite not having a convincing history/body habitus typical for suspecting sleep apnea- patients with sickle cell have a higher prevalence of sleep disorders including nocturnal hypoxemia\"--> rec sleep referral for formal testing if deemed appropriate, ABG and O2 destat testing, and TTE with bubble study  - TTE 8/23 showing EF 60-65%, severely dilated LV " and LA, + intrapulmonary shunting    - FU with Pulm outpatient (appt requested)     # DVT prophy:   - Lovenox subcutaneous, SCDs, encourage ambulation     # DISPO:  - Full code, confirmed on admit  - DC pending improvement in pain  - SUSIE Narayan (Parent) [phone] - updated at pt's bedside on 9/4   - urology 9/10, Sickle cell 9/18, Dr. Stoll (oncologist) 9/19, Pulm requested/pending        I spent >60 minutes in the professional and overall care of this patient.      CORINA AgudeloC

## 2024-09-08 VITALS
HEART RATE: 76 BPM | RESPIRATION RATE: 18 BRPM | DIASTOLIC BLOOD PRESSURE: 62 MMHG | SYSTOLIC BLOOD PRESSURE: 127 MMHG | TEMPERATURE: 98.6 F | OXYGEN SATURATION: 94 %

## 2024-09-08 LAB
ALBUMIN SERPL BCP-MCNC: 3.7 G/DL (ref 3.4–5)
ALP SERPL-CCNC: 202 U/L (ref 33–120)
ALT SERPL W P-5'-P-CCNC: 66 U/L (ref 10–52)
ANION GAP SERPL CALC-SCNC: 13 MMOL/L (ref 10–20)
AST SERPL W P-5'-P-CCNC: 74 U/L (ref 9–39)
BASOPHILS # BLD MANUAL: 0 X10*3/UL (ref 0–0.1)
BASOPHILS NFR BLD MANUAL: 0 %
BILIRUB SERPL-MCNC: 5.1 MG/DL (ref 0–1.2)
BUN SERPL-MCNC: 7 MG/DL (ref 6–23)
CALCIUM SERPL-MCNC: 9.3 MG/DL (ref 8.6–10.6)
CHLORIDE SERPL-SCNC: 107 MMOL/L (ref 98–107)
CO2 SERPL-SCNC: 26 MMOL/L (ref 21–32)
CREAT SERPL-MCNC: 0.57 MG/DL (ref 0.5–1.3)
EGFRCR SERPLBLD CKD-EPI 2021: >90 ML/MIN/1.73M*2
EOSINOPHIL # BLD MANUAL: 0.32 X10*3/UL (ref 0–0.7)
EOSINOPHIL NFR BLD MANUAL: 3.6 %
ERYTHROCYTE [DISTWIDTH] IN BLOOD BY AUTOMATED COUNT: 21.4 % (ref 11.5–14.5)
GLUCOSE SERPL-MCNC: 87 MG/DL (ref 74–99)
HCT VFR BLD AUTO: 20.5 % (ref 41–52)
HGB BLD-MCNC: 7.2 G/DL (ref 13.5–17.5)
HGB RETIC QN: 29 PG (ref 28–38)
IMM GRANULOCYTES # BLD AUTO: 0.22 X10*3/UL (ref 0–0.7)
IMM GRANULOCYTES NFR BLD AUTO: 2.5 % (ref 0–0.9)
IMMATURE RETIC FRACTION: 19.1 %
LDH SERPL L TO P-CCNC: 354 U/L (ref 84–246)
LYMPHOCYTES # BLD MANUAL: 1.98 X10*3/UL (ref 1.2–4.8)
LYMPHOCYTES NFR BLD MANUAL: 22.3 %
MCH RBC QN AUTO: 29.8 PG (ref 26–34)
MCHC RBC AUTO-ENTMCNC: 35.1 G/DL (ref 32–36)
MCV RBC AUTO: 85 FL (ref 80–100)
MONOCYTES # BLD MANUAL: 0.47 X10*3/UL (ref 0.1–1)
MONOCYTES NFR BLD MANUAL: 5.3 %
MYELOCYTES # BLD MANUAL: 0.08 X10*3/UL
MYELOCYTES NFR BLD MANUAL: 0.9 %
NEUTROPHILS # BLD MANUAL: 6.04 X10*3/UL (ref 1.2–7.7)
NEUTS BAND # BLD MANUAL: 0.24 X10*3/UL (ref 0–0.7)
NEUTS BAND NFR BLD MANUAL: 2.7 %
NEUTS SEG # BLD MANUAL: 5.8 X10*3/UL (ref 1.2–7)
NEUTS SEG NFR BLD MANUAL: 65.2 %
NRBC BLD-RTO: 2.1 /100 WBCS (ref 0–0)
PLATELET # BLD AUTO: 504 X10*3/UL (ref 150–450)
POLYCHROMASIA BLD QL SMEAR: NORMAL
POTASSIUM SERPL-SCNC: 4.1 MMOL/L (ref 3.5–5.3)
PROT SERPL-MCNC: 6 G/DL (ref 6.4–8.2)
RBC # BLD AUTO: 2.42 X10*6/UL (ref 4.5–5.9)
RBC MORPH BLD: NORMAL
RETICS #: 0.49 X10*6/UL (ref 0.02–0.12)
RETICS/RBC NFR AUTO: 20.4 % (ref 0.5–2)
SICKLE CELLS BLD QL SMEAR: NORMAL
SODIUM SERPL-SCNC: 142 MMOL/L (ref 136–145)
TARGETS BLD QL SMEAR: NORMAL
TOTAL CELLS COUNTED BLD: 112
WBC # BLD AUTO: 8.9 X10*3/UL (ref 4.4–11.3)

## 2024-09-08 PROCEDURE — 2500000001 HC RX 250 WO HCPCS SELF ADMINISTERED DRUGS (ALT 637 FOR MEDICARE OP)

## 2024-09-08 PROCEDURE — 2500000004 HC RX 250 GENERAL PHARMACY W/ HCPCS (ALT 636 FOR OP/ED)

## 2024-09-08 PROCEDURE — 85027 COMPLETE CBC AUTOMATED: CPT

## 2024-09-08 PROCEDURE — 85007 BL SMEAR W/DIFF WBC COUNT: CPT

## 2024-09-08 PROCEDURE — 36415 COLL VENOUS BLD VENIPUNCTURE: CPT

## 2024-09-08 PROCEDURE — 2500000005 HC RX 250 GENERAL PHARMACY W/O HCPCS

## 2024-09-08 PROCEDURE — 84075 ASSAY ALKALINE PHOSPHATASE: CPT

## 2024-09-08 PROCEDURE — 99233 SBSQ HOSP IP/OBS HIGH 50: CPT | Performed by: PHYSICIAN ASSISTANT

## 2024-09-08 PROCEDURE — 1200000003 HC ONCOLOGY  ROOM WITH TELEMETRY DAILY

## 2024-09-08 PROCEDURE — 85045 AUTOMATED RETICULOCYTE COUNT: CPT

## 2024-09-08 PROCEDURE — 2500000002 HC RX 250 W HCPCS SELF ADMINISTERED DRUGS (ALT 637 FOR MEDICARE OP, ALT 636 FOR OP/ED)

## 2024-09-08 PROCEDURE — 83615 LACTATE (LD) (LDH) ENZYME: CPT

## 2024-09-08 RX ADMIN — BACLOFEN 5 MG: 10 TABLET ORAL at 20:00

## 2024-09-08 RX ADMIN — HYDROMORPHONE HYDROCHLORIDE 2.5 MG: 2 INJECTION, SOLUTION INTRAMUSCULAR; INTRAVENOUS; SUBCUTANEOUS at 22:28

## 2024-09-08 RX ADMIN — GABAPENTIN 100 MG: 100 CAPSULE ORAL at 22:29

## 2024-09-08 RX ADMIN — BACLOFEN 5 MG: 10 TABLET ORAL at 09:12

## 2024-09-08 RX ADMIN — KETOROLAC TROMETHAMINE 30 MG: 30 INJECTION, SOLUTION INTRAMUSCULAR; INTRAVENOUS at 17:01

## 2024-09-08 RX ADMIN — HYDROMORPHONE HYDROCHLORIDE 2.5 MG: 2 INJECTION, SOLUTION INTRAMUSCULAR; INTRAVENOUS; SUBCUTANEOUS at 14:06

## 2024-09-08 RX ADMIN — GABAPENTIN 100 MG: 100 CAPSULE ORAL at 14:06

## 2024-09-08 RX ADMIN — HYDROMORPHONE HYDROCHLORIDE 2.5 MG: 2 INJECTION, SOLUTION INTRAMUSCULAR; INTRAVENOUS; SUBCUTANEOUS at 09:12

## 2024-09-08 RX ADMIN — FOLIC ACID 1 MG: 1 TABLET ORAL at 09:12

## 2024-09-08 RX ADMIN — GABAPENTIN 100 MG: 100 CAPSULE ORAL at 06:54

## 2024-09-08 RX ADMIN — HYDROMORPHONE HYDROCHLORIDE 2.5 MG: 2 INJECTION, SOLUTION INTRAMUSCULAR; INTRAVENOUS; SUBCUTANEOUS at 11:37

## 2024-09-08 RX ADMIN — PANTOPRAZOLE SODIUM 20 MG: 20 TABLET, DELAYED RELEASE ORAL at 06:54

## 2024-09-08 RX ADMIN — KETOROLAC TROMETHAMINE 30 MG: 30 INJECTION, SOLUTION INTRAMUSCULAR; INTRAVENOUS at 06:54

## 2024-09-08 RX ADMIN — HYDROMORPHONE HYDROCHLORIDE 2.5 MG: 2 INJECTION, SOLUTION INTRAMUSCULAR; INTRAVENOUS; SUBCUTANEOUS at 02:25

## 2024-09-08 RX ADMIN — HYDROMORPHONE HYDROCHLORIDE 2.5 MG: 2 INJECTION, SOLUTION INTRAMUSCULAR; INTRAVENOUS; SUBCUTANEOUS at 16:58

## 2024-09-08 RX ADMIN — HYDROMORPHONE HYDROCHLORIDE 2.5 MG: 2 INJECTION, SOLUTION INTRAMUSCULAR; INTRAVENOUS; SUBCUTANEOUS at 04:35

## 2024-09-08 RX ADMIN — HYDROMORPHONE HYDROCHLORIDE 2.5 MG: 2 INJECTION, SOLUTION INTRAMUSCULAR; INTRAVENOUS; SUBCUTANEOUS at 06:54

## 2024-09-08 RX ADMIN — HYDROMORPHONE HYDROCHLORIDE 2.5 MG: 2 INJECTION, SOLUTION INTRAMUSCULAR; INTRAVENOUS; SUBCUTANEOUS at 19:59

## 2024-09-08 RX ADMIN — Medication 2 L/MIN: at 20:00

## 2024-09-08 RX ADMIN — KETOROLAC TROMETHAMINE 30 MG: 30 INJECTION, SOLUTION INTRAMUSCULAR; INTRAVENOUS at 11:38

## 2024-09-08 RX ADMIN — BACLOFEN 5 MG: 10 TABLET ORAL at 14:06

## 2024-09-08 ASSESSMENT — COGNITIVE AND FUNCTIONAL STATUS - GENERAL
STANDING UP FROM CHAIR USING ARMS: A LITTLE
DAILY ACTIVITIY SCORE: 24
WALKING IN HOSPITAL ROOM: A LITTLE
TURNING FROM BACK TO SIDE WHILE IN FLAT BAD: A LITTLE
MOVING TO AND FROM BED TO CHAIR: A LITTLE
MOBILITY SCORE: 20

## 2024-09-08 ASSESSMENT — PAIN - FUNCTIONAL ASSESSMENT
PAIN_FUNCTIONAL_ASSESSMENT: 0-10

## 2024-09-08 ASSESSMENT — PAIN SCALES - GENERAL
PAINLEVEL_OUTOF10: 8
PAINLEVEL_OUTOF10: 9
PAINLEVEL_OUTOF10: 8
PAINLEVEL_OUTOF10: 9
PAINLEVEL_OUTOF10: 9
PAINLEVEL_OUTOF10: 8
PAINLEVEL_OUTOF10: 9

## 2024-09-08 NOTE — PROGRESS NOTES
Joellen Narayan is a 23 y.o. 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    Subjective   Seen at bedside, still having penile pain but has significantly improved from prior, we discussed plan for discharge tomorrow if pain is manageable. We also discussed urology follow up and patient states he understand what kinds of symptoms will prompt him to come back to the ED, urology has had discussions with him on what to expect regarding his priapism.  Denies N/V/D/C, chest pain, cough, shortness of breath.      Objective     Physical Exam  Constitutional:       General: He is not in acute distress.     Appearance: He is not toxic-appearing.   HENT:      Head: Normocephalic and atraumatic.   Eyes:      General: Scleral icterus present.      Extraocular Movements: Extraocular movements intact.   Cardiovascular:      Rate and Rhythm: Normal rate and regular rhythm.   Pulmonary:      Effort: No respiratory distress.   Abdominal:      General: Abdomen is flat.      Palpations: Abdomen is soft.      Tenderness: There is no abdominal tenderness.   Genitourinary:     Penis: Tenderness present.    Musculoskeletal:         General: No swelling or tenderness.   Skin:     Coloration: Skin is not jaundiced.      Findings: No bruising or erythema.   Neurological:      Mental Status: He is oriented to person, place, and time. Mental status is at baseline.         Last Recorded Vitals  Blood pressure 120/64, pulse 80, temperature 36.8 °C (98.2 °F), temperature source Temporal, resp. rate 18, SpO2 94%.  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 Attempted drainage in ED with Urology, patient unable to tolerate so was given ketamine. Priapism initially resolved without drainage upon ketamine infusion but returned a few hours later and he was taken to OR for drainage on 8/31. Admitted for further pain control, started on dilaudid IVP. Priapism continued on 9/1, requiring multiple doses of sudafed without resolution. Heme consulted 9/1 for RBCEx given persistent priapism, rec no RBCEx at this time, schedule sudafed q8h and added ibuprofen, baclofen 5mg TID and gabapentin 100mg TID. Penile doppler US (9/1) showing small caliber of bilateral cavernosal arteries with lack of color flow in portions of both arteries, more on the right, c/w low-flow/ischemic priapism. Urology notified, s/p phenylephrine injection with urology 9/1 evening with partial resolution of priapism, okay to advance diet 9/3 per urology as not planning surgical intervention at this time. Patient started on morphine PCA on 9/2 for uncontrolled pain, 9/3 start IVP morphine, 9/4 changed IVP morphine to IVP dilaudid due to continued pain. Given high residual pain that is only minimally responding to opioids, chronic pain consulted 9/6 for further recs. DC home pending further control of pain.      # Priapism  - frequent episodes within the last month, has had 3 ED visitis with same complaint  - Pt missed urology FUV 7/2, new appt 10/7 (will try for sooner appt)  - 8/31 Presented to the ED with priapism >14hrs  - Urology consulted and attempted drainage in ED, patient unable to tolerate so was given ketamine; Priapism initially resolved without drainage upon ketamine infusion but returned a few hours later and he was taken to OR for drainage  - s/p OR  8/31 with urology for priapism drainage, penile block, and corpora cavernosa irrigation  - priapism continued on 9/1, requiring multiple doses of sudafed without resolution  - urology consulted and s/p phenylephrine injection with urology 9/1 evening with partial resolution of priapism  - Heme consulted 9/1 for RBCEx given persistent priapism, rec no RBCEx at this time, schedule sudafed q8h and add ibuprofen  - penile doppler US (9/1) showing small caliber of bilateral cavernosal arteries with lack of color flow in portions of both arteries, more on the right, c/w low-flow/ischemic priapism   - Per urology kept patient npo for possible OR for penile shunt 9/1-9/2, urology has no plans for penile shunt at this time   - 9/4 re-engaged urology for continued severe pain and pt. Requesting additional phenylepinephrine injection. Per urology, no indication at this time and recommended further pain control   - s/p dPCA for pain control; initially 0.2mg demand q10min with 1 hr max of 1mg and then later increased to 0.4mg demand q6min with 1hr max of 2.5mg   - s/p morphine PCA 9/2 as dilaudid did not seem to be helping pain; 2mg demand dose q10 min with 1 hr max of 12mg (9/2--9/3)  - s/p  IVP morphine 5mg q2 hours PRN severe pain (9/3-9/4)  - s/p IVP dilaudid 2mg q2 hours PRN severe pain per patient request (9/4-9/5)   - 9/5 increased dose of IVP dilaudid 2.5mg q2 hours PRN severe pain (9/5-)   - 9/6 added scheduled toradol 30mg IVP q6h for further pain control   - s/p scheduled sudafed q8 hours (9/1--9/6), changed back to PRN 9/6 in prep for DC   - added baclofen 5mg TID and gabapentin 100mg TID; per urology, some small studies that suggest potential role in reducing recurrent priapism   - chronic pain consulted 9/6 given high residual pain, recs pending   - 9/7: Urology saw him and cleared for discharge from priapism stand point, penis is firm and bendable without increase pain, FUV on 9/10     # Hgb SS with severe pain  -  OARRS reviewed, no aberrancies  - No current care path  - Hgb 6.6 on 9/5 (baseline ~8.5); s/p 1 unit PRBC on 9/5   - Tbili 12.2 (9/3)-->11.8 (9/4)--> 8.3 (9/5), Tbili baseline fluctuates ~5-13  - up trending WBC (18.3) on 9/3 as of 9/5 continuing to downtrend (14.4), no s/sx of infection likely reactive in setting of sickle cell, continue to monitor  - Hgb S (8/31): 75.5%   - s/p d IV dilaudid 2mg q2hrs PRN severe pain (8/31-9/1)  - s/p dPCA for pain control; initially 0.2mg demand q10min with 1 hr max of 1mg and then later increased to 0.4mg demand q6min with 1hr max of 2.5mg   - s/p morphine PCA 9/2 as dilaudid did not seem to be helping pain; 2mg demand dose q10 min with 1 hr max of 12mg (9/2--9/3)  - s/p IVP morphine 5mg q2 hours PRN severe pain (9/3-9/4)  - s/p IVP dilaudid 2mg q2 hours PRN severe pain per patient request (9/4-9/5)   - 9/5 increased dose of IVP dilaudid 2.5mg q2 hours PRN severe pain (9/5-), will cont current regime  - Continue folic acid 1mg daily  - PO Zofran PRN for opioid-induced nausea, PO Benadryl PRN for opioid-induced pruritus, Bowel regimen for opioid-induced constipation with DocuSenna 2tabs BID and Miralax daily      # Nodular lymphocyte predominant Hodgkins lymphoma (NLPHL)   - Primary Oncologist: Dr. Stoll  - Enlarged lymph nodes noted 4/1/22  - RUQ US (11/14/22) with mildly enlarged LNs in the region of the kavin hepatis  - MRI liver (11/16/22) showed re-demonstration of bulky retroperitoneal lymphadenopathy and kavin hepatic lymphadenopathy    - (11/18/22) lymph node biopsy showed atypical lymphoid infiltrate. Reviewed by Hemepath board, insufficient for lymphoma diagnosis  - PET/CT (12/6/22) showing retroperitoneal lymphadenopathy  - Followed up with Dr. Stoll (12/16/22) with plan for surg/onc consult for large tissue bx but patient missed apt and was lost to follow up  - Requested new apt with Dr. Ronnie Marte 6/19/23, patient was not seen and lost to follow up  - CT a/p  "(11/28/23) increased size of retroperitoneal lymph nodes reflecting extramedullary hematopoiesis I/s/o sickle cell vs lymphoma  - Paraaortic LN bx via IR (11/30/23) consistent with NLPHL. Flow: no clonal B cell or T cell population identified, lymphocyte 95%, CD3+CD4+ 68%, CD3+CD8+ 7%, CD19+ 24%  - Elevated LDH/bili partially from sickle cell disease   - Chemotherapy (R-CHOP) was discussed with primary oncologist Dr. Stoll, and decision was made to simplify his chemotherapy to Rituxan and prednisone q3 weeks mainly due to frequent sickle cell crisis  - Current chemo regimen: rituxan and prednisone q3 weeks (C1 1/18/24, C2 2/8/24, Missed C3 d/t sickle cell pain crisis, C4 4/4/24, C5 5/16/24, C6 6/7/24)  - Per pt no longer taking Acyclovir 400mg BID prophy   - PET CT (7/25) overall showing great response to treatment with persistent residual viable disease involving a left common iliac node  - Last FUV with Dr. Stoll 7/25/24 rec RTC in 2 months (next FUV scheduled for 9/19)     # Choledocholithiasis  - s/p ERCP 7/18/24 with a biliary sphincterotomy where sludge and stones were removed, achieving complete clearance  - Seen by gen surg 8/8/24 Dr. Dove who rec lap cholecystectomy d/t the chance of recurrence of choledocholithiasis  - Surgery has yet to be scheduled, referral placed for scheduling   - Tbili 12.2 (9/3) --> 11.8 (9/4)--> 8.3 (9/5); Tbili baseline fluctuates ~5-13 which is markedly improved, recent peak at 52.8 prior to ERCP during recent hospital admission  - As of 9/5 patient without any abdominal pain, consider 24 hours of D5 1/2 v. RUQ US if tbili continues to uptrend   - FUV with general surgery for lap landen requested      # Hx of nocturnal oxygen dependence and hypoxia on room air  - Has not had home oxygen for 2-3 years   - Wean as able, encourage incentive spirometry  - Pulm FUV 8/8- \"Given his history of sickle cell disease, it is important to ensure he has formal sleep testing to look at " "desaturations and presence of sleep apnea despite not having a convincing history/body habitus typical for suspecting sleep apnea- patients with sickle cell have a higher prevalence of sleep disorders including nocturnal hypoxemia\"--> rec sleep referral for formal testing if deemed appropriate, ABG and O2 destat testing, and TTE with bubble study  - TTE 8/23 showing EF 60-65%, severely dilated LV and LA, + intrapulmonary shunting    - FU with Pulm outpatient (appt requested)     # DVT prophy:   - Lovenox subcutaneous, SCDs, encourage ambulation     # DISPO:  - Full code, confirmed on admit  - DC pending improvement in pain likely tomorrow 9/9  - SUSIE Narayan (Parent) [phone] - updated at pt's bedside on 9/4   - urology 9/10, Sickle cell 9/18, Dr. Stoll (oncologist) 9/19, Pulm requested/pending        I spent >60 minutes in the professional and overall care of this patient.      Sachin Reynolds PA-C      "

## 2024-09-09 ENCOUNTER — PHARMACY VISIT (OUTPATIENT)
Dept: PHARMACY | Facility: CLINIC | Age: 24
End: 2024-09-09
Payer: MEDICARE

## 2024-09-09 VITALS
RESPIRATION RATE: 16 BRPM | DIASTOLIC BLOOD PRESSURE: 70 MMHG | OXYGEN SATURATION: 96 % | SYSTOLIC BLOOD PRESSURE: 123 MMHG | HEART RATE: 96 BPM | TEMPERATURE: 98.2 F

## 2024-09-09 DIAGNOSIS — D57.00 SICKLE-CELL DISEASE WITH PAIN (MULTI): ICD-10-CM

## 2024-09-09 LAB
ALBUMIN SERPL BCP-MCNC: 3.8 G/DL (ref 3.4–5)
ALP SERPL-CCNC: 196 U/L (ref 33–120)
ALT SERPL W P-5'-P-CCNC: 62 U/L (ref 10–52)
ANION GAP SERPL CALC-SCNC: 11 MMOL/L (ref 10–20)
AST SERPL W P-5'-P-CCNC: 66 U/L (ref 9–39)
BASOPHILS # BLD MANUAL: 0 X10*3/UL (ref 0–0.1)
BASOPHILS NFR BLD MANUAL: 0 %
BILIRUB SERPL-MCNC: 4.8 MG/DL (ref 0–1.2)
BUN SERPL-MCNC: 6 MG/DL (ref 6–23)
CALCIUM SERPL-MCNC: 9.1 MG/DL (ref 8.6–10.6)
CHLORIDE SERPL-SCNC: 106 MMOL/L (ref 98–107)
CO2 SERPL-SCNC: 27 MMOL/L (ref 21–32)
CREAT SERPL-MCNC: 0.5 MG/DL (ref 0.5–1.3)
EGFRCR SERPLBLD CKD-EPI 2021: >90 ML/MIN/1.73M*2
EOSINOPHIL # BLD MANUAL: 0.24 X10*3/UL (ref 0–0.7)
EOSINOPHIL NFR BLD MANUAL: 2.6 %
ERYTHROCYTE [DISTWIDTH] IN BLOOD BY AUTOMATED COUNT: 21.2 % (ref 11.5–14.5)
GLUCOSE SERPL-MCNC: 78 MG/DL (ref 74–99)
HCT VFR BLD AUTO: 19.8 % (ref 41–52)
HGB BLD-MCNC: 6.9 G/DL (ref 13.5–17.5)
HGB RETIC QN: 29 PG (ref 28–38)
IMM GRANULOCYTES # BLD AUTO: 0.2 X10*3/UL (ref 0–0.7)
IMM GRANULOCYTES NFR BLD AUTO: 2.2 % (ref 0–0.9)
IMMATURE RETIC FRACTION: 15.7 %
LDH SERPL L TO P-CCNC: 368 U/L (ref 84–246)
LYMPHOCYTES # BLD MANUAL: 2.06 X10*3/UL (ref 1.2–4.8)
LYMPHOCYTES NFR BLD MANUAL: 22.4 %
MCH RBC QN AUTO: 29.9 PG (ref 26–34)
MCHC RBC AUTO-ENTMCNC: 34.8 G/DL (ref 32–36)
MCV RBC AUTO: 86 FL (ref 80–100)
MONOCYTES # BLD MANUAL: 0.63 X10*3/UL (ref 0.1–1)
MONOCYTES NFR BLD MANUAL: 6.9 %
NEUTROPHILS # BLD MANUAL: 6.26 X10*3/UL (ref 1.2–7.7)
NEUTS BAND # BLD MANUAL: 0.55 X10*3/UL (ref 0–0.7)
NEUTS BAND NFR BLD MANUAL: 6 %
NEUTS SEG # BLD MANUAL: 5.71 X10*3/UL (ref 1.2–7)
NEUTS SEG NFR BLD MANUAL: 62.1 %
NRBC BLD-RTO: 1.8 /100 WBCS (ref 0–0)
PAPPENHEIMER BOD BLD QL SMEAR: PRESENT
PLATELET # BLD AUTO: 455 X10*3/UL (ref 150–450)
POLYCHROMASIA BLD QL SMEAR: NORMAL
POTASSIUM SERPL-SCNC: 4 MMOL/L (ref 3.5–5.3)
PROT SERPL-MCNC: 5.9 G/DL (ref 6.4–8.2)
RBC # BLD AUTO: 2.31 X10*6/UL (ref 4.5–5.9)
RBC MORPH BLD: NORMAL
RETICS #: 0.4 X10*6/UL (ref 0.02–0.12)
RETICS/RBC NFR AUTO: 17.1 % (ref 0.5–2)
SCHISTOCYTES BLD QL SMEAR: NORMAL
SICKLE CELLS BLD QL SMEAR: NORMAL
SODIUM SERPL-SCNC: 140 MMOL/L (ref 136–145)
TARGETS BLD QL SMEAR: NORMAL
TOTAL CELLS COUNTED BLD: 116
WBC # BLD AUTO: 9.2 X10*3/UL (ref 4.4–11.3)

## 2024-09-09 PROCEDURE — 85007 BL SMEAR W/DIFF WBC COUNT: CPT

## 2024-09-09 PROCEDURE — 36415 COLL VENOUS BLD VENIPUNCTURE: CPT

## 2024-09-09 PROCEDURE — 84075 ASSAY ALKALINE PHOSPHATASE: CPT

## 2024-09-09 PROCEDURE — 2500000005 HC RX 250 GENERAL PHARMACY W/O HCPCS

## 2024-09-09 PROCEDURE — 2500000004 HC RX 250 GENERAL PHARMACY W/ HCPCS (ALT 636 FOR OP/ED)

## 2024-09-09 PROCEDURE — 99239 HOSP IP/OBS DSCHRG MGMT >30: CPT

## 2024-09-09 PROCEDURE — 83615 LACTATE (LD) (LDH) ENZYME: CPT

## 2024-09-09 PROCEDURE — 85045 AUTOMATED RETICULOCYTE COUNT: CPT

## 2024-09-09 PROCEDURE — RXMED WILLOW AMBULATORY MEDICATION CHARGE

## 2024-09-09 PROCEDURE — 85027 COMPLETE CBC AUTOMATED: CPT

## 2024-09-09 PROCEDURE — 2500000002 HC RX 250 W HCPCS SELF ADMINISTERED DRUGS (ALT 637 FOR MEDICARE OP, ALT 636 FOR OP/ED)

## 2024-09-09 PROCEDURE — 2500000001 HC RX 250 WO HCPCS SELF ADMINISTERED DRUGS (ALT 637 FOR MEDICARE OP)

## 2024-09-09 RX ORDER — OXYCODONE HYDROCHLORIDE 15 MG/1
15 TABLET ORAL EVERY 6 HOURS PRN
Qty: 20 TABLET | Refills: 0 | Status: ON HOLD | OUTPATIENT
Start: 2024-09-09 | End: 2024-09-14

## 2024-09-09 RX ADMIN — PANTOPRAZOLE SODIUM 20 MG: 20 TABLET, DELAYED RELEASE ORAL at 06:54

## 2024-09-09 RX ADMIN — HYDROMORPHONE HYDROCHLORIDE 2.5 MG: 2 INJECTION, SOLUTION INTRAMUSCULAR; INTRAVENOUS; SUBCUTANEOUS at 00:36

## 2024-09-09 RX ADMIN — HYDROMORPHONE HYDROCHLORIDE 2.5 MG: 2 INJECTION, SOLUTION INTRAMUSCULAR; INTRAVENOUS; SUBCUTANEOUS at 02:48

## 2024-09-09 RX ADMIN — KETOROLAC TROMETHAMINE 30 MG: 30 INJECTION, SOLUTION INTRAMUSCULAR; INTRAVENOUS at 01:35

## 2024-09-09 RX ADMIN — HYDROMORPHONE HYDROCHLORIDE 2.5 MG: 2 INJECTION, SOLUTION INTRAMUSCULAR; INTRAVENOUS; SUBCUTANEOUS at 06:54

## 2024-09-09 RX ADMIN — KETOROLAC TROMETHAMINE 30 MG: 30 INJECTION, SOLUTION INTRAMUSCULAR; INTRAVENOUS at 09:27

## 2024-09-09 RX ADMIN — BACLOFEN 5 MG: 10 TABLET ORAL at 09:28

## 2024-09-09 RX ADMIN — HYDROMORPHONE HYDROCHLORIDE 2.5 MG: 2 INJECTION, SOLUTION INTRAMUSCULAR; INTRAVENOUS; SUBCUTANEOUS at 09:37

## 2024-09-09 RX ADMIN — FOLIC ACID 1 MG: 1 TABLET ORAL at 09:28

## 2024-09-09 RX ADMIN — HYDROMORPHONE HYDROCHLORIDE 2.5 MG: 2 INJECTION, SOLUTION INTRAMUSCULAR; INTRAVENOUS; SUBCUTANEOUS at 04:53

## 2024-09-09 RX ADMIN — GABAPENTIN 100 MG: 100 CAPSULE ORAL at 06:54

## 2024-09-09 RX ADMIN — Medication 2 L/MIN: at 09:56

## 2024-09-09 RX ADMIN — HYDROMORPHONE HYDROCHLORIDE 2.5 MG: 2 INJECTION, SOLUTION INTRAMUSCULAR; INTRAVENOUS; SUBCUTANEOUS at 12:19

## 2024-09-09 ASSESSMENT — COGNITIVE AND FUNCTIONAL STATUS - GENERAL
HELP NEEDED FOR BATHING: A LITTLE
WALKING IN HOSPITAL ROOM: A LITTLE
DRESSING REGULAR LOWER BODY CLOTHING: A LITTLE
PERSONAL GROOMING: A LITTLE
TURNING FROM BACK TO SIDE WHILE IN FLAT BAD: A LITTLE
MOVING TO AND FROM BED TO CHAIR: A LITTLE
TOILETING: A LITTLE
STANDING UP FROM CHAIR USING ARMS: A LITTLE
DRESSING REGULAR UPPER BODY CLOTHING: A LITTLE

## 2024-09-09 ASSESSMENT — PAIN SCALES - GENERAL
PAINLEVEL_OUTOF10: 8
PAINLEVEL_OUTOF10: 9
PAINLEVEL_OUTOF10: 8

## 2024-09-09 NOTE — PROGRESS NOTES
Spiritual Care Visit    Clinical Encounter Type  Visited With: Patient  Routine Visit: Follow-up  Continue Visiting: Yes     visited patient Joellen Narayan. Patient welcomed a visit from facility amrik Baptiste. Hand hygiene with  was observed before and after visit, and facility dog visited alongside of bed. Patient shared he was feeling much better and expected to discharge home this afternoon. He was appreciative of care and did not have any needs at this time. Spiritual Care remains available as needed/requested.    Rev. Diana Álvarez MDiv, BCC

## 2024-09-09 NOTE — CARE PLAN
The patient's goals for the shift include pain control    The clinical goals for the shift include pt will remain safe    O  Problem: Pain  Goal: Walks with improved pain control throughout the shift  Outcome: Progressing     Problem: Pain  Goal: Participates in PT with improved pain control throughout the shift  Outcome: Progressing

## 2024-09-09 NOTE — DISCHARGE SUMMARY
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 ED 8/31 with priapism since 8/30 at 10AM. Attempted drainage in ED with Urology, patient unable to tolerate so was given ketamine. Priapism initially resolved without drainage upon ketamine infusion but returned a few hours later and he was taken to OR for drainage on 8/31. Admitted for further pain control, started on dilaudid IVP. Priapism continued on 9/1, requiring multiple doses of sudafed without resolution. Heme consulted 9/1 for RBCEx given persistent priapism, rec no RBCEx at this time, schedule sudafed q8h and added ibuprofen, baclofen 5mg TID and gabapentin 100mg TID. Penile doppler US (9/1) showing small caliber of bilateral cavernosal arteries with lack of color flow in portions of both arteries, more on the right, c/w low-flow/ischemic priapism. Urology notified, s/p phenylephrine injection with urology 9/1 evening with partial resolution of priapism, okay to advance diet 9/3 per urology as not planning surgical intervention at this time. Patient started on morphine PCA on 9/2 for uncontrolled pain, 9/3 start IVP morphine, 9/4 changed IVP morphine to IVP dilaudid due to continued pain. Pain was better controlled with addition of ketoralac. He felt ready for DC 9/9.     He has Urology FU tomorrow, 9/10 and Sickle Cell on 9/18. A refil of his oxycodone was sent MTB 9/9.     On the day of discharge, the patient reported feeling well and pain was controlled. Vitals and labs were stable. On  exam:  Constitutional: Awake, NAD  ENMT: mucous membranes moist, no apparent injury, no lesions seen  Head/Neck: NCAT  Respiratory/Thorax: CTAB, no wheezing  Cardiovascular: RRR, S1+S2, no murmur  Gastrointestinal: Soft, NT, nondistended, +BS  Extremities: No LE edema  Psychological: Appropriate mood and behavior  Skin: no rash noted      Patient discharged in stable condition. > 30 minutes spent on discharge planning.        Home Medications     Medication List      START taking these medications     baclofen 5 mg tablet; Commonly known as: Lioresal; Take 1 tablet (5 mg)   by mouth 3 times a day.   gabapentin 100 mg capsule; Commonly known as: Neurontin; Take 1 capsule   (100 mg) by mouth every 8 hours.   pantoprazole 20 mg EC tablet; Commonly known as: ProtoNix; Take 1 tablet   (20 mg) by mouth once daily in the morning. Take before meals. Do not   crush, chew, or split.   pseudoephedrine 60 mg tablet; Commonly known as: Sudafed; Take 1 tablet   (60 mg) by mouth every 8 hours if needed for congestion for up to 10 days.     CONTINUE taking these medications     folic acid 1 mg tablet; Commonly known as: Folvite; Take 1 tablet (1 mg)   by mouth once daily for 28 days.   oxyCODONE 15 mg immediate release tablet; Commonly known as: Roxicodone;   Take 1 tablet (15 mg) by mouth every 6 hours if needed (Severe sickle cell   pain) for up to 5 days.       Outpatient Follow-Up  Future Appointments   Date Time Provider Department Center   9/10/2024 10:05 AM Guru Godinez MD [id number] [facility]   9/18/2024  8:30 AM Homa Dickinson APRN-CNP [id number] [facility]   9/19/2024  9:20 AM Melissa Stoll MD [id number] Academic   10/7/2024  8:30 AM Josiah Christiansen MD [facility]       Mary Moody APRN-CNP

## 2024-09-09 NOTE — PROGRESS NOTES
Art Therapy Note    Joellen Narayan     Therapy Session  Referral Type: New referral this admission  Visit Type: New visit  Session Start Time: 1555  Session End Time: 1556  Intervention Delivery: In-person  Conflict of Service: Asleep  Number of family members present: 0              Treatment/Interventions  Art Therapy Interventions: Assessment, Education/instruction    Post-assessment  Total Session Time (min): 1 minutes    Narrative  Assessment Detail: ATR made a visit to Pt.;s room to follow up on areferral made, introduce, educate and assess AT needs and nwats.  Pt foundsound madhuri in his romm,  ATr willfollwo up another time to offer services.    Education Documentation  No documentation found.

## 2024-09-10 ENCOUNTER — APPOINTMENT (OUTPATIENT)
Dept: UROLOGY | Facility: CLINIC | Age: 24
End: 2024-09-10
Payer: COMMERCIAL

## 2024-09-10 DIAGNOSIS — N48.30 PRIAPISM: Primary | ICD-10-CM

## 2024-09-10 PROCEDURE — 99214 OFFICE O/P EST MOD 30 MIN: CPT | Performed by: UROLOGY

## 2024-09-10 PROCEDURE — 4004F PT TOBACCO SCREEN RCVD TLK: CPT | Performed by: UROLOGY

## 2024-09-10 PROCEDURE — 93980 PENILE VASCULAR STUDY: CPT | Performed by: UROLOGY

## 2024-09-10 RX ORDER — FLUTAMIDE 125 MG/1
125 CAPSULE ORAL 3 TIMES DAILY
Qty: 90 CAPSULE | Refills: 1 | Status: SHIPPED | OUTPATIENT
Start: 2024-09-10 | End: 2024-09-13 | Stop reason: HOSPADM

## 2024-09-10 ASSESSMENT — PAIN SCALES - GENERAL: PAINLEVEL: 9

## 2024-09-10 NOTE — PROGRESS NOTES
Procedure:  Intracavernosal injection   Penile ultrasound/gray scale   Penile duplex Doppler ultrasound     Indication: Priapism   Penile Ultrasound: Morphologic and gray scale evaluation of the penis     1.- Tunical integrity:    Normal: smooth, continuous with thickness < 2 mm     2.- Vascular integrity:     Grade 1: continuous and smooth vessel wall     3.- Erectile tissue integrity:   Fibrotic; patchy hypoechoic areas or coalesced central hypoechoic defect     Medication:     NOne  Penile curvature: none   Phenylephrine: none     Penile duplex Doppler ultrasound:             Right   Left   Cavernosal artery diameters:     .8 mm                 .57    mm   Peak systolic velocities:   31.5 cm/sec   21.4 cm/sec    End diastolic velocities:  0 cm/sec   0 cm/sec       Impression: arterial flow noted     Plan: flutamide  The patient was also counseled extensively that today or while on injections If he develops a priapism / erection over 4 hours he was ask to return to the office or to the emergency room immediately. Educational material was provided and all his questions and concerns were addressed.
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
## 2024-09-11 DIAGNOSIS — D57.00 SICKLE-CELL DISEASE WITH PAIN (MULTI): ICD-10-CM

## 2024-09-11 RX ORDER — OXYCODONE HYDROCHLORIDE 15 MG/1
15 TABLET ORAL EVERY 6 HOURS PRN
Qty: 20 TABLET | Refills: 0 | Status: CANCELLED | OUTPATIENT
Start: 2024-09-11 | End: 2024-09-16

## 2024-09-12 ENCOUNTER — HOSPITAL ENCOUNTER (INPATIENT)
Facility: HOSPITAL | Age: 24
DRG: 812 | End: 2024-09-12
Attending: INTERNAL MEDICINE
Payer: COMMERCIAL

## 2024-09-12 ENCOUNTER — CLINICAL SUPPORT (OUTPATIENT)
Dept: EMERGENCY MEDICINE | Facility: HOSPITAL | Age: 24
DRG: 812 | End: 2024-09-12
Payer: COMMERCIAL

## 2024-09-12 ENCOUNTER — APPOINTMENT (OUTPATIENT)
Dept: RADIOLOGY | Facility: HOSPITAL | Age: 24
DRG: 812 | End: 2024-09-12
Payer: COMMERCIAL

## 2024-09-12 DIAGNOSIS — D57.09 PRIAPISM DUE TO SICKLE CELL DISEASE (MULTI): ICD-10-CM

## 2024-09-12 DIAGNOSIS — C81.03 NODULAR LYMPHOCYTE PREDOMINANT HODGKIN LYMPHOMA OF INTRA-ABDOMINAL LYMPH NODES (MULTI): ICD-10-CM

## 2024-09-12 DIAGNOSIS — D57.00 SICKLE CELL CRISIS (MULTI): Primary | ICD-10-CM

## 2024-09-12 DIAGNOSIS — N48.32 PRIAPISM DUE TO SICKLE CELL DISEASE (MULTI): ICD-10-CM

## 2024-09-12 LAB
ACANTHOCYTES BLD QL SMEAR: ABNORMAL
ALBUMIN SERPL BCP-MCNC: 4.6 G/DL (ref 3.4–5)
ALP SERPL-CCNC: 239 U/L (ref 33–120)
ALT SERPL W P-5'-P-CCNC: 55 U/L (ref 10–52)
ANION GAP SERPL CALC-SCNC: 14 MMOL/L (ref 10–20)
AST SERPL W P-5'-P-CCNC: 64 U/L (ref 9–39)
BASO STIPL BLD QL SMEAR: PRESENT
BASOPHILS # BLD MANUAL: 0 X10*3/UL (ref 0–0.1)
BASOPHILS NFR BLD MANUAL: 0 %
BILIRUB SERPL-MCNC: 6 MG/DL (ref 0–1.2)
BUN SERPL-MCNC: 6 MG/DL (ref 6–23)
BURR CELLS BLD QL SMEAR: ABNORMAL
CALCIUM SERPL-MCNC: 10 MG/DL (ref 8.6–10.6)
CARDIAC TROPONIN I PNL SERPL HS: 3 NG/L (ref 0–53)
CHLORIDE SERPL-SCNC: 106 MMOL/L (ref 98–107)
CO2 SERPL-SCNC: 25 MMOL/L (ref 21–32)
CREAT SERPL-MCNC: 0.62 MG/DL (ref 0.5–1.3)
EGFRCR SERPLBLD CKD-EPI 2021: >90 ML/MIN/1.73M*2
EOSINOPHIL # BLD MANUAL: 0.26 X10*3/UL (ref 0–0.7)
EOSINOPHIL NFR BLD MANUAL: 1.7 %
ERYTHROCYTE [DISTWIDTH] IN BLOOD BY AUTOMATED COUNT: 25 % (ref 11.5–14.5)
GLUCOSE SERPL-MCNC: 92 MG/DL (ref 74–99)
HCT VFR BLD AUTO: 25.9 % (ref 41–52)
HGB BLD-MCNC: 8.3 G/DL (ref 13.5–17.5)
HGB RETIC QN: 30 PG (ref 28–38)
IMM GRANULOCYTES # BLD AUTO: 0.92 X10*3/UL (ref 0–0.7)
IMM GRANULOCYTES NFR BLD AUTO: 5.9 % (ref 0–0.9)
IMMATURE RETIC FRACTION: 35 %
LDH SERPL L TO P-CCNC: 476 U/L (ref 84–246)
LYMPHOCYTES # BLD MANUAL: 2.12 X10*3/UL (ref 1.2–4.8)
LYMPHOCYTES NFR BLD MANUAL: 13.7 %
MCH RBC QN AUTO: 29.1 PG (ref 26–34)
MCHC RBC AUTO-ENTMCNC: 32 G/DL (ref 32–36)
MCV RBC AUTO: 91 FL (ref 80–100)
METAMYELOCYTES # BLD MANUAL: 0.12 X10*3/UL
METAMYELOCYTES NFR BLD MANUAL: 0.8 %
MONOCYTES # BLD MANUAL: 0.4 X10*3/UL (ref 0.1–1)
MONOCYTES NFR BLD MANUAL: 2.6 %
MYELOCYTES # BLD MANUAL: 0.4 X10*3/UL
MYELOCYTES NFR BLD MANUAL: 2.6 %
NEUTS SEG # BLD MANUAL: 11.52 X10*3/UL (ref 1.2–7)
NEUTS SEG NFR BLD MANUAL: 74.3 %
NRBC BLD-RTO: 4 /100 WBCS (ref 0–0)
OVALOCYTES BLD QL SMEAR: ABNORMAL
PLATELET # BLD AUTO: 431 X10*3/UL (ref 150–450)
POLYCHROMASIA BLD QL SMEAR: ABNORMAL
POTASSIUM SERPL-SCNC: 4.6 MMOL/L (ref 3.5–5.3)
PROT SERPL-MCNC: 7.4 G/DL (ref 6.4–8.2)
RBC # BLD AUTO: 2.85 X10*6/UL (ref 4.5–5.9)
RBC MORPH BLD: ABNORMAL
RETICS #: 0.61 X10*6/UL (ref 0.02–0.12)
RETICS/RBC NFR AUTO: 21.3 % (ref 0.5–2)
SICKLE CELLS BLD QL SMEAR: ABNORMAL
SODIUM SERPL-SCNC: 140 MMOL/L (ref 136–145)
TOTAL CELLS COUNTED BLD: 117
VARIANT LYMPHS # BLD MANUAL: 0.67 X10*3/UL (ref 0–0.5)
VARIANT LYMPHS NFR BLD: 4.3 %
WBC # BLD AUTO: 15.5 X10*3/UL (ref 4.4–11.3)

## 2024-09-12 PROCEDURE — 93010 ELECTROCARDIOGRAM REPORT: CPT | Performed by: PHYSICIAN ASSISTANT

## 2024-09-12 PROCEDURE — 99285 EMERGENCY DEPT VISIT HI MDM: CPT | Mod: 25

## 2024-09-12 PROCEDURE — 36415 COLL VENOUS BLD VENIPUNCTURE: CPT | Performed by: PHYSICIAN ASSISTANT

## 2024-09-12 PROCEDURE — 2500000005 HC RX 250 GENERAL PHARMACY W/O HCPCS: Performed by: PHYSICIAN ASSISTANT

## 2024-09-12 PROCEDURE — 96375 TX/PRO/DX INJ NEW DRUG ADDON: CPT

## 2024-09-12 PROCEDURE — 96374 THER/PROPH/DIAG INJ IV PUSH: CPT

## 2024-09-12 PROCEDURE — 99285 EMERGENCY DEPT VISIT HI MDM: CPT | Performed by: PHYSICIAN ASSISTANT

## 2024-09-12 PROCEDURE — 71045 X-RAY EXAM CHEST 1 VIEW: CPT | Performed by: RADIOLOGY

## 2024-09-12 PROCEDURE — 84484 ASSAY OF TROPONIN QUANT: CPT | Performed by: PHYSICIAN ASSISTANT

## 2024-09-12 PROCEDURE — 83020 HEMOGLOBIN ELECTROPHORESIS: CPT

## 2024-09-12 PROCEDURE — 85027 COMPLETE CBC AUTOMATED: CPT | Performed by: PHYSICIAN ASSISTANT

## 2024-09-12 PROCEDURE — 83615 LACTATE (LD) (LDH) ENZYME: CPT | Performed by: PHYSICIAN ASSISTANT

## 2024-09-12 PROCEDURE — 80053 COMPREHEN METABOLIC PANEL: CPT | Performed by: PHYSICIAN ASSISTANT

## 2024-09-12 PROCEDURE — 96361 HYDRATE IV INFUSION ADD-ON: CPT

## 2024-09-12 PROCEDURE — 2500000004 HC RX 250 GENERAL PHARMACY W/ HCPCS (ALT 636 FOR OP/ED): Performed by: PHYSICIAN ASSISTANT

## 2024-09-12 PROCEDURE — 2500000001 HC RX 250 WO HCPCS SELF ADMINISTERED DRUGS (ALT 637 FOR MEDICARE OP): Performed by: PHYSICIAN ASSISTANT

## 2024-09-12 PROCEDURE — 93010 ELECTROCARDIOGRAM REPORT: CPT | Performed by: STUDENT IN AN ORGANIZED HEALTH CARE EDUCATION/TRAINING PROGRAM

## 2024-09-12 PROCEDURE — 71045 X-RAY EXAM CHEST 1 VIEW: CPT

## 2024-09-12 PROCEDURE — 83021 HEMOGLOBIN CHROMOTOGRAPHY: CPT

## 2024-09-12 PROCEDURE — 85007 BL SMEAR W/DIFF WBC COUNT: CPT | Performed by: PHYSICIAN ASSISTANT

## 2024-09-12 PROCEDURE — 93005 ELECTROCARDIOGRAM TRACING: CPT

## 2024-09-12 PROCEDURE — 85045 AUTOMATED RETICULOCYTE COUNT: CPT | Performed by: PHYSICIAN ASSISTANT

## 2024-09-12 RX ORDER — HYDROMORPHONE HYDROCHLORIDE 1 MG/ML
1 INJECTION, SOLUTION INTRAMUSCULAR; INTRAVENOUS; SUBCUTANEOUS ONCE
Status: COMPLETED | OUTPATIENT
Start: 2024-09-12 | End: 2024-09-13

## 2024-09-12 RX ORDER — HYDROMORPHONE HYDROCHLORIDE 1 MG/ML
1 INJECTION, SOLUTION INTRAMUSCULAR; INTRAVENOUS; SUBCUTANEOUS
Status: DISPENSED | OUTPATIENT
Start: 2024-09-12 | End: 2024-09-12

## 2024-09-12 RX ORDER — PSEUDOEPHEDRINE HYDROCHLORIDE 60 MG/1
60 TABLET ORAL ONCE
Status: COMPLETED | OUTPATIENT
Start: 2024-09-12 | End: 2024-09-12

## 2024-09-12 RX ORDER — KETOROLAC TROMETHAMINE 15 MG/ML
15 INJECTION, SOLUTION INTRAMUSCULAR; INTRAVENOUS ONCE
Status: COMPLETED | OUTPATIENT
Start: 2024-09-12 | End: 2024-09-12

## 2024-09-12 RX ADMIN — PSEUDOEPHEDRINE HYDROCHLORIDE 60 MG: 60 TABLET ORAL at 21:42

## 2024-09-12 RX ADMIN — KETOROLAC TROMETHAMINE 15 MG: 15 INJECTION, SOLUTION INTRAMUSCULAR; INTRAVENOUS at 21:42

## 2024-09-12 RX ADMIN — SODIUM CHLORIDE, POTASSIUM CHLORIDE, SODIUM LACTATE AND CALCIUM CHLORIDE 1000 ML: 600; 310; 30; 20 INJECTION, SOLUTION INTRAVENOUS at 21:42

## 2024-09-12 RX ADMIN — HYDROMORPHONE HYDROCHLORIDE 1 MG: 1 INJECTION, SOLUTION INTRAMUSCULAR; INTRAVENOUS; SUBCUTANEOUS at 21:40

## 2024-09-12 RX ADMIN — HYDROMORPHONE HYDROCHLORIDE 1 MG: 1 INJECTION, SOLUTION INTRAMUSCULAR; INTRAVENOUS; SUBCUTANEOUS at 22:51

## 2024-09-12 RX ADMIN — Medication 2 L/MIN: at 20:15

## 2024-09-12 ASSESSMENT — LIFESTYLE VARIABLES
HAVE PEOPLE ANNOYED YOU BY CRITICIZING YOUR DRINKING: NO
TOTAL SCORE: 0
EVER HAD A DRINK FIRST THING IN THE MORNING TO STEADY YOUR NERVES TO GET RID OF A HANGOVER: NO
EVER FELT BAD OR GUILTY ABOUT YOUR DRINKING: NO
HAVE YOU EVER FELT YOU SHOULD CUT DOWN ON YOUR DRINKING: NO

## 2024-09-12 ASSESSMENT — PAIN SCALES - GENERAL
PAINLEVEL_OUTOF10: 8
PAINLEVEL_OUTOF10: 9

## 2024-09-12 ASSESSMENT — PAIN DESCRIPTION - FREQUENCY: FREQUENCY: CONSTANT/CONTINUOUS

## 2024-09-12 ASSESSMENT — PAIN DESCRIPTION - PAIN TYPE: TYPE: ACUTE PAIN

## 2024-09-12 ASSESSMENT — PAIN - FUNCTIONAL ASSESSMENT: PAIN_FUNCTIONAL_ASSESSMENT: 0-10

## 2024-09-12 ASSESSMENT — PAIN DESCRIPTION - PROGRESSION: CLINICAL_PROGRESSION: NOT CHANGED

## 2024-09-12 ASSESSMENT — PAIN DESCRIPTION - LOCATION: LOCATION: CHEST

## 2024-09-12 ASSESSMENT — PAIN DESCRIPTION - DESCRIPTORS: DESCRIPTORS: ACHING

## 2024-09-12 NOTE — Clinical Note
11.5fr x 15cm apheresis catheter placed RIJ. No ectopy visualized during placement. New catheter aspirated, flushed, heparin loaded, capped, locked, secured with sutures and CHG tegaderm. Patient received 2mg versed, 100mcg fentanyl IVP for sedation. No visible bleeding or hematoma at RIJ site. VSS throughout procedure.

## 2024-09-13 ENCOUNTER — SOCIAL WORK (OUTPATIENT)
Dept: HEMATOLOGY/ONCOLOGY | Facility: HOSPITAL | Age: 24
End: 2024-09-13
Payer: COMMERCIAL

## 2024-09-13 LAB
ABO GROUP (TYPE) IN BLOOD: NORMAL
ALBUMIN SERPL BCP-MCNC: 4.2 G/DL (ref 3.4–5)
ALP SERPL-CCNC: 195 U/L (ref 33–120)
ALT SERPL W P-5'-P-CCNC: 46 U/L (ref 10–52)
AMPHETAMINES UR QL SCN: ABNORMAL
ANION GAP SERPL CALC-SCNC: 14 MMOL/L (ref 10–20)
ANTIBODY SCREEN: NORMAL
APPEARANCE UR: CLEAR
APTT PPP: 23 SECONDS (ref 27–38)
AST SERPL W P-5'-P-CCNC: 60 U/L (ref 9–39)
BARBITURATES UR QL SCN: ABNORMAL
BASOPHILS # BLD MANUAL: 0.12 X10*3/UL (ref 0–0.1)
BASOPHILS NFR BLD MANUAL: 0.9 %
BILIRUB SERPL-MCNC: 4.7 MG/DL (ref 0–1.2)
BILIRUB UR STRIP.AUTO-MCNC: NEGATIVE MG/DL
BUN SERPL-MCNC: 6 MG/DL (ref 6–23)
BZE UR QL SCN: ABNORMAL
CA-I BLD-SCNC: 1.16 MMOL/L (ref 1.1–1.33)
CALCIUM SERPL-MCNC: 9.3 MG/DL (ref 8.6–10.6)
CANNABINOIDS UR QL SCN: ABNORMAL
CHLORIDE SERPL-SCNC: 108 MMOL/L (ref 98–107)
CO2 SERPL-SCNC: 23 MMOL/L (ref 21–32)
COLOR UR: YELLOW
CREAT SERPL-MCNC: 0.59 MG/DL (ref 0.5–1.3)
CREAT UR-MCNC: 102.3 MG/DL (ref 20–370)
EGFRCR SERPLBLD CKD-EPI 2021: >90 ML/MIN/1.73M*2
EOSINOPHIL # BLD MANUAL: 0.23 X10*3/UL (ref 0–0.7)
EOSINOPHIL NFR BLD MANUAL: 1.7 %
ERYTHROCYTE [DISTWIDTH] IN BLOOD BY AUTOMATED COUNT: 24.6 % (ref 11.5–14.5)
GLUCOSE SERPL-MCNC: 77 MG/DL (ref 74–99)
GLUCOSE UR STRIP.AUTO-MCNC: NORMAL MG/DL
HCT VFR BLD AUTO: 23.5 % (ref 41–52)
HEMOGLOBIN A2: ABNORMAL
HEMOGLOBIN A: ABNORMAL
HEMOGLOBIN F: ABNORMAL
HEMOGLOBIN IDENTIFICATION INTERPRETATION: ABNORMAL
HEMOGLOBIN S: 59.8 %
HGB BLD-MCNC: 7.6 G/DL (ref 13.5–17.5)
HGB RETIC QN: 29 PG (ref 28–38)
HOLD SPECIMEN: NORMAL
IMM GRANULOCYTES # BLD AUTO: 0.66 X10*3/UL (ref 0–0.7)
IMM GRANULOCYTES NFR BLD AUTO: 4.8 % (ref 0–0.9)
IMMATURE RETIC FRACTION: 34 %
INR PPP: 1.2 (ref 0.9–1.1)
KETONES UR STRIP.AUTO-MCNC: NEGATIVE MG/DL
LDH SERPL L TO P-CCNC: 520 U/L (ref 84–246)
LEUKOCYTE ESTERASE UR QL STRIP.AUTO: ABNORMAL
LYMPHOCYTES # BLD MANUAL: 3.89 X10*3/UL (ref 1.2–4.8)
LYMPHOCYTES NFR BLD MANUAL: 28.2 %
MCH RBC QN AUTO: 29.1 PG (ref 26–34)
MCHC RBC AUTO-ENTMCNC: 32.3 G/DL (ref 32–36)
MCV RBC AUTO: 90 FL (ref 80–100)
MONOCYTES # BLD MANUAL: 0 X10*3/UL (ref 0.1–1)
MONOCYTES NFR BLD MANUAL: 0 %
MYELOCYTES # BLD MANUAL: 0.47 X10*3/UL
MYELOCYTES NFR BLD MANUAL: 3.4 %
NEUTS SEG # BLD MANUAL: 8.85 X10*3/UL (ref 1.2–7)
NEUTS SEG NFR BLD MANUAL: 64.1 %
NITRITE UR QL STRIP.AUTO: NEGATIVE
NRBC BLD-RTO: 4.4 /100 WBCS (ref 0–0)
PATH REVIEW-HGB IDENTIFICATION: ABNORMAL
PCP UR QL SCN: ABNORMAL
PH UR STRIP.AUTO: 8 [PH]
PLATELET # BLD AUTO: 421 X10*3/UL (ref 150–450)
POLYCHROMASIA BLD QL SMEAR: ABNORMAL
POTASSIUM SERPL-SCNC: 4.7 MMOL/L (ref 3.5–5.3)
PROT SERPL-MCNC: 6.5 G/DL (ref 6.4–8.2)
PROT UR STRIP.AUTO-MCNC: NEGATIVE MG/DL
PROTHROMBIN TIME: 13.6 SECONDS (ref 9.8–12.8)
RBC # BLD AUTO: 2.61 X10*6/UL (ref 4.5–5.9)
RBC # UR STRIP.AUTO: NEGATIVE /UL
RBC #/AREA URNS AUTO: ABNORMAL /HPF
RBC MORPH BLD: ABNORMAL
RETICS #: 0.57 X10*6/UL (ref 0.02–0.12)
RETICS/RBC NFR AUTO: 22 % (ref 0.5–2)
RH FACTOR (ANTIGEN D): NORMAL
SICKLE CELLS BLD QL SMEAR: ABNORMAL
SODIUM SERPL-SCNC: 140 MMOL/L (ref 136–145)
SP GR UR STRIP.AUTO: 1.01
TOTAL CELLS COUNTED BLD: 117
UROBILINOGEN UR STRIP.AUTO-MCNC: NORMAL MG/DL
VARIANT LYMPHS # BLD MANUAL: 0.23 X10*3/UL (ref 0–0.5)
VARIANT LYMPHS NFR BLD: 1.7 %
WBC # BLD AUTO: 13.8 X10*3/UL (ref 4.4–11.3)
WBC #/AREA URNS AUTO: ABNORMAL /HPF

## 2024-09-13 PROCEDURE — 85045 AUTOMATED RETICULOCYTE COUNT: CPT

## 2024-09-13 PROCEDURE — 86902 BLOOD TYPE ANTIGEN DONOR EA: CPT

## 2024-09-13 PROCEDURE — 82330 ASSAY OF CALCIUM: CPT

## 2024-09-13 PROCEDURE — 2500000001 HC RX 250 WO HCPCS SELF ADMINISTERED DRUGS (ALT 637 FOR MEDICARE OP)

## 2024-09-13 PROCEDURE — 85660 RBC SICKLE CELL TEST: CPT

## 2024-09-13 PROCEDURE — 85610 PROTHROMBIN TIME: CPT

## 2024-09-13 PROCEDURE — 87491 CHLMYD TRACH DNA AMP PROBE: CPT

## 2024-09-13 PROCEDURE — 96376 TX/PRO/DX INJ SAME DRUG ADON: CPT

## 2024-09-13 PROCEDURE — 36415 COLL VENOUS BLD VENIPUNCTURE: CPT

## 2024-09-13 PROCEDURE — 99221 1ST HOSP IP/OBS SF/LOW 40: CPT

## 2024-09-13 PROCEDURE — 2500000005 HC RX 250 GENERAL PHARMACY W/O HCPCS: Performed by: PHYSICIAN ASSISTANT

## 2024-09-13 PROCEDURE — 83615 LACTATE (LD) (LDH) ENZYME: CPT

## 2024-09-13 PROCEDURE — 2500000004 HC RX 250 GENERAL PHARMACY W/ HCPCS (ALT 636 FOR OP/ED)

## 2024-09-13 PROCEDURE — 86901 BLOOD TYPING SEROLOGIC RH(D): CPT

## 2024-09-13 PROCEDURE — 85007 BL SMEAR W/DIFF WBC COUNT: CPT

## 2024-09-13 PROCEDURE — 86920 COMPATIBILITY TEST SPIN: CPT

## 2024-09-13 PROCEDURE — 80346 BENZODIAZEPINES1-12: CPT

## 2024-09-13 PROCEDURE — 99223 1ST HOSP IP/OBS HIGH 75: CPT

## 2024-09-13 PROCEDURE — 2500000002 HC RX 250 W HCPCS SELF ADMINISTERED DRUGS (ALT 637 FOR MEDICARE OP, ALT 636 FOR OP/ED)

## 2024-09-13 PROCEDURE — 85027 COMPLETE CBC AUTOMATED: CPT

## 2024-09-13 PROCEDURE — 80307 DRUG TEST PRSMV CHEM ANLYZR: CPT

## 2024-09-13 PROCEDURE — 2500000004 HC RX 250 GENERAL PHARMACY W/ HCPCS (ALT 636 FOR OP/ED): Performed by: PHYSICIAN ASSISTANT

## 2024-09-13 PROCEDURE — 80349 CANNABINOIDS NATURAL: CPT

## 2024-09-13 PROCEDURE — 87086 URINE CULTURE/COLONY COUNT: CPT | Performed by: PHYSICIAN ASSISTANT

## 2024-09-13 PROCEDURE — 1210000001 HC SEMI-PRIVATE ROOM DAILY

## 2024-09-13 PROCEDURE — RXMED WILLOW AMBULATORY MEDICATION CHARGE

## 2024-09-13 PROCEDURE — 84075 ASSAY ALKALINE PHOSPHATASE: CPT

## 2024-09-13 PROCEDURE — 81003 URINALYSIS AUTO W/O SCOPE: CPT | Performed by: PHYSICIAN ASSISTANT

## 2024-09-13 RX ORDER — KETOCONAZOLE 200 MG/1
200 TABLET ORAL 2 TIMES DAILY
Qty: 60 TABLET | Refills: 0 | Status: SHIPPED | OUTPATIENT
Start: 2024-09-13 | End: 2024-10-13

## 2024-09-13 RX ORDER — DIPHENHYDRAMINE HCL 25 MG
25 CAPSULE ORAL EVERY 6 HOURS PRN
Status: DISCONTINUED | OUTPATIENT
Start: 2024-09-13 | End: 2024-09-28 | Stop reason: HOSPADM

## 2024-09-13 RX ORDER — DOCUSATE SODIUM 100 MG/1
100 CAPSULE, LIQUID FILLED ORAL 2 TIMES DAILY PRN
Status: DISCONTINUED | OUTPATIENT
Start: 2024-09-13 | End: 2024-09-16

## 2024-09-13 RX ORDER — ACETAMINOPHEN 325 MG/1
650 TABLET ORAL ONCE
Status: CANCELLED | OUTPATIENT
Start: 2024-09-14

## 2024-09-13 RX ORDER — PREDNISONE 10 MG/1
5 TABLET ORAL DAILY
Status: DISCONTINUED | OUTPATIENT
Start: 2024-09-13 | End: 2024-09-28 | Stop reason: HOSPADM

## 2024-09-13 RX ORDER — PSEUDOEPHEDRINE HYDROCHLORIDE 60 MG/1
60 TABLET ORAL EVERY 8 HOURS PRN
Status: DISCONTINUED | OUTPATIENT
Start: 2024-09-13 | End: 2024-09-14

## 2024-09-13 RX ORDER — GABAPENTIN 100 MG/1
100 CAPSULE ORAL EVERY 8 HOURS SCHEDULED
Status: DISCONTINUED | OUTPATIENT
Start: 2024-09-13 | End: 2024-09-28 | Stop reason: HOSPADM

## 2024-09-13 RX ORDER — POLYETHYLENE GLYCOL 3350 17 G/17G
17 POWDER, FOR SOLUTION ORAL 2 TIMES DAILY PRN
Status: DISCONTINUED | OUTPATIENT
Start: 2024-09-13 | End: 2024-09-16

## 2024-09-13 RX ORDER — HEPARIN SODIUM 1000 [USP'U]/ML
1000 INJECTION, SOLUTION INTRAVENOUS; SUBCUTANEOUS ONCE
Status: CANCELLED | OUTPATIENT
Start: 2024-09-14

## 2024-09-13 RX ORDER — ONDANSETRON 8 MG/1
8 TABLET, ORALLY DISINTEGRATING ORAL EVERY 8 HOURS PRN
Status: DISCONTINUED | OUTPATIENT
Start: 2024-09-13 | End: 2024-09-28 | Stop reason: HOSPADM

## 2024-09-13 RX ORDER — BACLOFEN 10 MG/1
5 TABLET ORAL 3 TIMES DAILY
Status: DISCONTINUED | OUTPATIENT
Start: 2024-09-13 | End: 2024-09-28 | Stop reason: HOSPADM

## 2024-09-13 RX ORDER — ONDANSETRON HYDROCHLORIDE 2 MG/ML
8 INJECTION, SOLUTION INTRAVENOUS EVERY 8 HOURS PRN
Status: DISCONTINUED | OUTPATIENT
Start: 2024-09-13 | End: 2024-09-14

## 2024-09-13 RX ORDER — DIPHENHYDRAMINE HCL 25 MG
50 CAPSULE ORAL ONCE
Status: CANCELLED | OUTPATIENT
Start: 2024-09-14

## 2024-09-13 RX ORDER — DIPHENHYDRAMINE HYDROCHLORIDE 50 MG/ML
25 INJECTION INTRAMUSCULAR; INTRAVENOUS EVERY 5 MIN PRN
Status: CANCELLED | OUTPATIENT
Start: 2024-09-14

## 2024-09-13 RX ORDER — FLUTAMIDE 125 MG/1
125 CAPSULE ORAL 3 TIMES DAILY
Status: DISCONTINUED | OUTPATIENT
Start: 2024-09-13 | End: 2024-09-28 | Stop reason: HOSPADM

## 2024-09-13 RX ORDER — PREDNISONE 5 MG/1
5 TABLET ORAL DAILY
Qty: 30 TABLET | Refills: 0 | Status: SHIPPED | OUTPATIENT
Start: 2024-09-13 | End: 2024-10-28

## 2024-09-13 RX ORDER — CALCIUM CARBONATE 200(500)MG
1500 TABLET,CHEWABLE ORAL EVERY 5 MIN PRN
Status: CANCELLED | OUTPATIENT
Start: 2024-09-14

## 2024-09-13 RX ORDER — PANTOPRAZOLE SODIUM 20 MG/1
20 TABLET, DELAYED RELEASE ORAL
Status: DISCONTINUED | OUTPATIENT
Start: 2024-09-13 | End: 2024-09-22

## 2024-09-13 RX ORDER — FOLIC ACID 1 MG/1
1 TABLET ORAL DAILY
Status: DISCONTINUED | OUTPATIENT
Start: 2024-09-13 | End: 2024-09-28 | Stop reason: HOSPADM

## 2024-09-13 RX ORDER — KETOROLAC TROMETHAMINE 30 MG/ML
30 INJECTION, SOLUTION INTRAMUSCULAR; INTRAVENOUS EVERY 6 HOURS
Status: DISCONTINUED | OUTPATIENT
Start: 2024-09-13 | End: 2024-09-16

## 2024-09-13 RX ORDER — KETOCONAZOLE 200 MG/1
200 TABLET ORAL 2 TIMES DAILY
Status: DISCONTINUED | OUTPATIENT
Start: 2024-09-13 | End: 2024-09-28 | Stop reason: HOSPADM

## 2024-09-13 RX ADMIN — HYDROMORPHONE HYDROCHLORIDE 2 MG: 2 INJECTION, SOLUTION INTRAMUSCULAR; INTRAVENOUS; SUBCUTANEOUS at 10:46

## 2024-09-13 RX ADMIN — HYDROMORPHONE HYDROCHLORIDE 2 MG: 2 INJECTION, SOLUTION INTRAMUSCULAR; INTRAVENOUS; SUBCUTANEOUS at 14:28

## 2024-09-13 RX ADMIN — GABAPENTIN 100 MG: 100 CAPSULE ORAL at 00:49

## 2024-09-13 RX ADMIN — HYDROMORPHONE HYDROCHLORIDE 2 MG: 2 INJECTION, SOLUTION INTRAMUSCULAR; INTRAVENOUS; SUBCUTANEOUS at 06:09

## 2024-09-13 RX ADMIN — HYDROMORPHONE HYDROCHLORIDE 2 MG: 2 INJECTION, SOLUTION INTRAMUSCULAR; INTRAVENOUS; SUBCUTANEOUS at 08:29

## 2024-09-13 RX ADMIN — HYDROMORPHONE HYDROCHLORIDE 2 MG: 2 INJECTION, SOLUTION INTRAMUSCULAR; INTRAVENOUS; SUBCUTANEOUS at 01:48

## 2024-09-13 RX ADMIN — PANTOPRAZOLE SODIUM 20 MG: 20 TABLET, DELAYED RELEASE ORAL at 06:09

## 2024-09-13 RX ADMIN — HYDROMORPHONE HYDROCHLORIDE 2 MG: 2 INJECTION, SOLUTION INTRAMUSCULAR; INTRAVENOUS; SUBCUTANEOUS at 04:05

## 2024-09-13 RX ADMIN — GABAPENTIN 100 MG: 100 CAPSULE ORAL at 06:09

## 2024-09-13 RX ADMIN — BACLOFEN 5 MG: 10 TABLET ORAL at 21:18

## 2024-09-13 RX ADMIN — KETOROLAC TROMETHAMINE 30 MG: 30 INJECTION, SOLUTION INTRAMUSCULAR; INTRAVENOUS at 18:20

## 2024-09-13 RX ADMIN — HYDROMORPHONE HYDROCHLORIDE 2 MG: 2 INJECTION, SOLUTION INTRAMUSCULAR; INTRAVENOUS; SUBCUTANEOUS at 21:12

## 2024-09-13 RX ADMIN — GABAPENTIN 100 MG: 100 CAPSULE ORAL at 14:25

## 2024-09-13 RX ADMIN — KETOROLAC TROMETHAMINE 30 MG: 30 INJECTION, SOLUTION INTRAMUSCULAR; INTRAVENOUS at 06:09

## 2024-09-13 RX ADMIN — HYDROMORPHONE HYDROCHLORIDE 2 MG: 2 INJECTION, SOLUTION INTRAMUSCULAR; INTRAVENOUS; SUBCUTANEOUS at 21:18

## 2024-09-13 RX ADMIN — BACLOFEN 5 MG: 10 TABLET ORAL at 08:25

## 2024-09-13 RX ADMIN — BACLOFEN 5 MG: 10 TABLET ORAL at 14:25

## 2024-09-13 RX ADMIN — HYDROMORPHONE HYDROCHLORIDE 1 MG: 1 INJECTION, SOLUTION INTRAMUSCULAR; INTRAVENOUS; SUBCUTANEOUS at 00:05

## 2024-09-13 RX ADMIN — PREDNISONE 5 MG: 10 TABLET ORAL at 12:35

## 2024-09-13 RX ADMIN — Medication 2 L/MIN: at 08:24

## 2024-09-13 RX ADMIN — HYDROMORPHONE HYDROCHLORIDE 2 MG: 2 INJECTION, SOLUTION INTRAMUSCULAR; INTRAVENOUS; SUBCUTANEOUS at 17:23

## 2024-09-13 RX ADMIN — FOLIC ACID 1 MG: 1 TABLET ORAL at 08:25

## 2024-09-13 RX ADMIN — KETOROLAC TROMETHAMINE 30 MG: 30 INJECTION, SOLUTION INTRAMUSCULAR; INTRAVENOUS at 12:35

## 2024-09-13 RX ADMIN — KETOROLAC TROMETHAMINE 30 MG: 30 INJECTION, SOLUTION INTRAMUSCULAR; INTRAVENOUS at 00:51

## 2024-09-13 ASSESSMENT — ENCOUNTER SYMPTOMS
EYES NEGATIVE: 1
COUGH: 0
SHORTNESS OF BREATH: 0
HEMATOLOGIC/LYMPHATIC NEGATIVE: 1
DIZZINESS: 0
PSYCHIATRIC NEGATIVE: 1
ABDOMINAL PAIN: 1
BACK PAIN: 0
HEMATURIA: 0
NAUSEA: 0
BLOOD IN STOOL: 0
DIARRHEA: 0
WHEEZING: 0
ALLERGIC/IMMUNOLOGIC NEGATIVE: 1
ABDOMINAL DISTENTION: 0
FREQUENCY: 0
CHILLS: 0
LIGHT-HEADEDNESS: 0
FEVER: 0
HEADACHES: 1
DYSURIA: 0
PALPITATIONS: 0
NUMBNESS: 0
ENDOCRINE NEGATIVE: 1
VOMITING: 0
CONSTIPATION: 0

## 2024-09-13 ASSESSMENT — PAIN - FUNCTIONAL ASSESSMENT: PAIN_FUNCTIONAL_ASSESSMENT: 0-10

## 2024-09-13 ASSESSMENT — PAIN SCALES - GENERAL
PAINLEVEL_OUTOF10: 7
PAINLEVEL_OUTOF10: 9
PAINLEVEL_OUTOF10: 9
PAINLEVEL_OUTOF10: 10 - WORST POSSIBLE PAIN
PAINLEVEL_OUTOF10: 9
PAINLEVEL_OUTOF10: 9
PAINLEVEL_OUTOF10: 8
PAINLEVEL_OUTOF10: 10 - WORST POSSIBLE PAIN
PAINLEVEL_OUTOF10: 9
PAINLEVEL_OUTOF10: 8
PAINLEVEL_OUTOF10: 9
PAINLEVEL_OUTOF10: 8
PAINLEVEL_OUTOF10: 9

## 2024-09-13 ASSESSMENT — PAIN DESCRIPTION - LOCATION
LOCATION: GROIN
LOCATION: GENERALIZED

## 2024-09-13 ASSESSMENT — PAIN DESCRIPTION - ORIENTATION: ORIENTATION: RIGHT;LEFT

## 2024-09-13 ASSESSMENT — PAIN DESCRIPTION - PAIN TYPE: TYPE: ACUTE PAIN
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
## 2024-09-13 NOTE — SIGNIFICANT EVENT
Joellen Narayan is a 23 y.o. 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 Urology was consulted in the ED and patient and mom do not agree to bedside drainage and corporal blood gas but may agree to Phenylephrine injection if absolutely necessary. Patient is also currently having pain in his chest and abdomen, typical of his sickle cell pain. He stated that he took his home Oxycodone without relief. Given ongoing pain after 3 doses of Dilaudid in the ED, he is now admitted for further care and management to [facility]. 9/13 started on Ketoconazole 200 mg BID and prednisone 5 mg daily per urology recs. STD testing sent-pending       # Priapism  - Presented to the ED with worsening priapism since 5pm (hadn't resolved from last admission)  - s/p recent admission 8/31-9/9, OR with urology for priapism drainage, penile block, and corpora cavernosa irrigation  - Also, s/p scheduled Sudafed q8h and added ibuprofen, baclofen 5mg TID and gabapentin 100mg TID.   - Penile doppler US while prior inpatient (9/1) showing small caliber of bilateral cavernosal arteries with lack of color flow in portions of both arteries, more on the right, c/w low-flow/ischemic priapism. Urology notified, s/p phenylephrine injection with urology 9/1 evening with partial resolution of priapism  - Reassessed by Urology 9/7/24 reevaluate penile appearance; firm but still bendable with no increased pain. Rec to continue management as prior and to maintain outpatient appointment on 9/10/24.  - Outpatient  appointment 9/10 prescribed Flutamide 125mg TID but per parent, no pharmacy has this medication so it was not started  - Doppler U/S completed outpatient 9/10 noted arterial flow  - Urology consulted in ED 9/12 and patient and mom both do not agree to bedside drainage and corporal blood gas but may agree to Phenylephrine injection if absolutely necessary- if penis is no longer bendable after period of observation, will consider phenylephrine injection and further interventions   - S/p Sudafed 60mg x 1 in ED  - Continue home Sudafed q8h prn and added ibuprofen, Baclofen 5mg TID and Gabapentin 100mg TID.  - Continued Flutamide but not in stock here  - 9/13 started on ketoconazole 200mg BID and Prednisone 5mg daily-per urology recs   - C. Trachomatis/N. Gonorrhoeae sent-pending       # Hgb SS with severe pain  - OARRS reviewed, no aberrancies  - No current care path  - Hgb baseline ~8.5, Hgb 8.3 (9/12)  - Tbili baseline fluctuates ~5-13, Tbili 6.0 (9/12)  - LDH baseline ~500,  (9/12)  - On admit started IV Dilaudid 2mg q2hrs PRN severe pain (9/12-current)  - Continue folic acid 1mg daily  - Hgb S (9/12): pending  - PO Zofran PRN for opioid-induced nausea, PO Benadryl PRN for opioid-induced pruritus, Bowel regimen for opioid-induced constipation with DocuSenna 2tabs BID and Miralax daily      # Nodular lymphocyte predominant Hodgkins lymphoma (NLPHL)   - Primary Oncologist: Dr. Stoll  - Enlarged lymph nodes noted 4/1/22  - RUQ US (11/14/22) with mildly enlarged LNs in the region of the kavin hepatis  - MRI liver (11/16/22) showed re-demonstration of bulky retroperitoneal lymphadenopathy and kavin hepatic lymphadenopathy    - (11/18/22) lymph node biopsy showed atypical lymphoid infiltrate. Reviewed by Hemepath board, insufficient for lymphoma diagnosis  - PET/CT (12/6/22) showing retroperitoneal lymphadenopathy  - Followed up with Dr. Stoll (12/16/22) with plan for surg/onc consult for large tissue bx but  "patient missed apt and was lost to follow up  - Requested new apt with Dr. Ronnie Marte 6/19/23, patient was not seen and lost to follow up  - CT a/p (11/28/23) increased size of retroperitoneal lymph nodes reflecting extramedullary hematopoiesis I/s/o sickle cell vs lymphoma  - Paraaortic LN bx via IR (11/30/23) consistent with NLPHL. Flow: no clonal B cell or T cell population identified, lymphocyte 95%, CD3+CD4+ 68%, CD3+CD8+ 7%, CD19+ 24%  - Elevated LDH/bili partially from sickle cell disease   - Chemotherapy (R-CHOP) was discussed with primary oncologist Dr. Stoll, and decision was made to simplify his chemotherapy to Rituxan and prednisone q3 weeks mainly due to frequent sickle cell crisis  - Current chemo regimen: Rituxan and prednisone q3 weeks (C1 1/18/24, C2 2/8/24, Missed C3 d/t sickle cell pain crisis, C4 4/4/24, C5 5/16/24, C6 6/7/24)  - Per pt no longer taking Acyclovir 400mg BID prophy   - PET CT (7/25) overall showing great response to treatment with persistent residual viable disease involving a left common iliac node  - Last FUV with Dr. Stoll 7/25/24 rec RTC in 2 months (next FUV scheduled for 9/19)     # Choledocholithiasis  - s/p ERCP 7/18/24 with a biliary sphincterotomy where sludge and stones were removed, achieving complete clearance  - Seen by gen surg 8/8/24 Dr. Dove who rec lap cholecystectomy d/t the chance of recurrence of choledocholithiasis  - Surgery has yet to be scheduled  - Tbili 6.0 (9/12) which is markedly improved, recent peak at 52.8 prior to ERCP during recent hospital admission     # Hx of nocturnal oxygen dependence and hypoxia on room air  - Has not had home oxygen for 2-3 years   - Wean as able, encourage incentive spirometry  - Pulm FUV 8/8- \"Given his history of sickle cell disease, it is important to ensure he has formal sleep testing to look at desaturations and presence of sleep apnea despite not having a convincing history/body habitus typical for suspecting " "sleep apnea- patients with sickle cell have a higher prevalence of sleep disorders including nocturnal hypoxemia\"--> rec sleep referral for formal testing if deemed appropriate, ABG and O2 destat testing, and TTE with bubble study  - TTE 8/23 showing EF 60-65%, severely dilated LV and LA, + intrapulmonary shunting       DVT prophy: Lovenox subcutaneous, SCDs, encourage ambulation     DISPO:  - Full code, confirmed on admit  - DC pending improvement in pain  - SUSIE Narayan (Parent) [phone]    Patient discussed with Dr. Correia   "

## 2024-09-13 NOTE — PROGRESS NOTES
[facility] [place]  Progress Note      Patient: Joellen Narayan  Age/Sex: 23 y.o., male  MRN: [id number]  Date of surgery:   Admit Date: 9/12/2024   Code Status: Full Code  Length of Stay: 0      Interval History/Overnight Events:   NAEON, reports not feeling good   Reports chest pain and penile pain   Able to void   Afebrile, bradycardic in the 50s overnight, normotensive   Denies any fevers, chills, nausea or vomiting  Tolerating regular diet  Partially erect penis and slightly bendable with some milky discharge. Reports occasional burning with urination       Objective  No intake/output data recorded.    Medications:  Current Facility-Administered Medications   Medication Dose Route Frequency Provider Last Rate Last Admin    baclofen (Lioresal) tablet 5 mg  5 mg oral TID Alicia L Gersin, APRN-CNP   5 mg at 09/13/24 0825    diphenhydrAMINE (BENADryl) capsule 25 mg  25 mg oral q6h PRN Alicia L Gersin, APRN-CNP        docusate sodium (Colace) capsule 100 mg  100 mg oral BID PRN Alicia L Gersin, APRN-CNP        Or    polyethylene glycol (Glycolax, Miralax) packet 17 g  17 g oral BID PRN Alicia L Gersin, APRN-CNP        flutamide (Eulexin) capsule 125 mg  125 mg oral TID Alicia L Gersin, APRN-CNP        folic acid (Folvite) tablet 1 mg  1 mg oral Daily Alicia L Gersin, APRN-CNP   1 mg at 09/13/24 0825    gabapentin (Neurontin) capsule 100 mg  100 mg oral q8h [facility] Alicia L Gersin, APRN-CNP   100 mg at 09/13/24 0609    HYDROmorphone PF (Dilaudid) injection 2 mg  2 mg intravenous q2h PRN Alicia L Gersin, APRN-CNP   2 mg at 09/13/24 0829    ketorolac (Toradol) injection 30 mg  30 mg intravenous q6h Alicia L Gersin, APRN-CNP   30 mg at 09/13/24 0609    ondansetron ODT (Zofran-ODT) disintegrating tablet 8 mg  8 mg oral q8h PRN Alicia L Gersin, APRN-CNP        Or    ondansetron (Zofran) injection 8 mg  8 mg intravenous q8h PRN RAAD Pagan-CNP        oxygen (O2) therapy   inhalation Continuous - Inhalation  Slime Paul PA-C   2 L/min at 09/13/24 0824    oxygen (O2) therapy   inhalation Continuous PRN - O2/gases RAVI Pagan        pantoprazole (ProtoNix) EC tablet 20 mg  20 mg oral Daily before breakfast RAVI Pagan   20 mg at 09/13/24 0609    pseudoephedrine (Sudafed) tablet 60 mg  60 mg oral q8h PRN RAVI Pagan         Current Outpatient Medications   Medication Sig Dispense Refill    baclofen (Lioresal) 5 mg tablet Take 1 tablet (5 mg) by mouth 3 times a day. 90 tablet 0    folic acid (Folvite) 1 mg tablet Take 1 tablet (1 mg) by mouth once daily for 28 days. 28 tablet 0    gabapentin (Neurontin) 100 mg capsule Take 1 capsule (100 mg) by mouth every 8 hours. 90 capsule 11    oxyCODONE (Roxicodone) 15 mg immediate release tablet Take 1 tablet (15 mg) by mouth every 6 hours if needed (Severe sickle cell pain) for up to 5 days. 20 tablet 0    pantoprazole (ProtoNix) 20 mg EC tablet Take 1 tablet (20 mg) by mouth once daily in the morning. Take before meals. Do not crush, chew, or split. 30 tablet 11    pseudoephedrine (Sudafed) 60 mg tablet Take 1 tablet (60 mg) by mouth every 8 hours if needed for congestion for up to 10 days. 30 tablet 0    flutamide (Eulexin) 125 mg capsule Take 1 capsule (125 mg total) by mouth 3 times a day.  Take with a full glass of water. 90 capsule 1     Vitals:    09/13/24 0400 09/13/24 0600 09/13/24 0800 09/13/24 0900   BP: 110/56 (!) 104/48 115/76 108/54   BP Location: Right arm      Patient Position: Sitting      Pulse: 52 54 53 54   Resp: 11 12 12 11   Temp:       TempSrc:       SpO2: 100% 100% 100% 100%   Weight:       Height:            Results for orders placed or performed during the hospital encounter of 09/12/24 (from the past 24 hour(s))   Reticulocytes   Result Value Ref Range    Retic % 21.3 (H) 0.5 - 2.0 %    Retic Absolute 0.606 (H) 0.022 - 0.118 x10*6/uL    Reticulocyte Hemoglobin 30 28 - 38 pg    Immature Retic fraction 35.0  (H) <=16.0 %   Comprehensive Metabolic Panel   Result Value Ref Range    Glucose 92 74 - 99 mg/dL    Sodium 140 136 - 145 mmol/L    Potassium 4.6 3.5 - 5.3 mmol/L    Chloride 106 98 - 107 mmol/L    Bicarbonate 25 21 - 32 mmol/L    Anion Gap 14 10 - 20 mmol/L    Urea Nitrogen 6 6 - 23 mg/dL    Creatinine 0.62 0.50 - 1.30 mg/dL    eGFR >90 >60 mL/min/1.73m*2    Calcium 10.0 8.6 - 10.6 mg/dL    Albumin 4.6 3.4 - 5.0 g/dL    Alkaline Phosphatase 239 (H) 33 - 120 U/L    Total Protein 7.4 6.4 - 8.2 g/dL    AST 64 (H) 9 - 39 U/L    Bilirubin, Total 6.0 (H) 0.0 - 1.2 mg/dL    ALT 55 (H) 10 - 52 U/L   CBC and Auto Differential   Result Value Ref Range    WBC 15.5 (H) 4.4 - 11.3 x10*3/uL    nRBC 4.0 (H) 0.0 - 0.0 /100 WBCs    RBC 2.85 (L) 4.50 - 5.90 x10*6/uL    Hemoglobin 8.3 (L) 13.5 - 17.5 g/dL    Hematocrit 25.9 (L) 41.0 - 52.0 %    MCV 91 80 - 100 fL    MCH 29.1 26.0 - 34.0 pg    MCHC 32.0 32.0 - 36.0 g/dL    RDW 25.0 (H) 11.5 - 14.5 %    Platelets 431 150 - 450 x10*3/uL    Immature Granulocytes %, Automated 5.9 (H) 0.0 - 0.9 %    Immature Granulocytes Absolute, Automated 0.92 (H) 0.00 - 0.70 x10*3/uL   Lactate Dehydrogenase   Result Value Ref Range     (H) 84 - 246 U/L   Troponin I, High Sensitivity   Result Value Ref Range    Troponin I, High Sensitivity (CMC) 3 0 - 53 ng/L   Manual Differential   Result Value Ref Range    Neutrophils %, Manual 74.3 40.0 - 80.0 %    Lymphocytes %, Manual 13.7 13.0 - 44.0 %    Monocytes %, Manual 2.6 2.0 - 10.0 %    Eosinophils %, Manual 1.7 0.0 - 6.0 %    Basophils %, Manual 0.0 0.0 - 2.0 %    Atypical Lymphocytes %, Manual 4.3 0.0 - 2.0 %    Metamyelocytes %, Manual 0.8 0.0 - 0.0 %    Myelocytes %, Manual 2.6 0.0 - 0.0 %    Seg Neutrophils Absolute, Manual 11.52 (H) 1.20 - 7.00 x10*3/uL    Lymphocytes Absolute, Manual 2.12 1.20 - 4.80 x10*3/uL    Monocytes Absolute, Manual 0.40 0.10 - 1.00 x10*3/uL    Eosinophils Absolute, Manual 0.26 0.00 - 0.70 x10*3/uL    Basophils Absolute,  Manual 0.00 0.00 - 0.10 x10*3/uL    Atypical Lymphs Absolute, Manual 0.67 (H) 0.00 - 0.50 x10*3/uL    Metamyelocytes Absolute, Manual 0.12 0.00 - 0.00 x10*3/uL    Myelocytes Absolute, Manual 0.40 0.00 - 0.00 x10*3/uL    Total Cells Counted 117     RBC Morphology See Below     Polychromasia Mild     Sickle Cells Many     Ovalocytes Few     Maurizio Cells Few     Acanthocytes Few     Basophilic Stippling Present    Urinalysis with Reflex Culture and Microscopic   Result Value Ref Range    Color, Urine Yellow Light-Yellow, Yellow, Dark-Yellow    Appearance, Urine Clear Clear    Specific Gravity, Urine 1.013 1.005 - 1.035    pH, Urine 8.0 5.0, 5.5, 6.0, 6.5, 7.0, 7.5, 8.0    Protein, Urine NEGATIVE NEGATIVE, 10 (TRACE), 20 (TRACE) mg/dL    Glucose, Urine Normal Normal mg/dL    Blood, Urine NEGATIVE NEGATIVE    Ketones, Urine NEGATIVE NEGATIVE mg/dL    Bilirubin, Urine NEGATIVE NEGATIVE    Urobilinogen, Urine Normal Normal mg/dL    Nitrite, Urine NEGATIVE NEGATIVE    Leukocyte Esterase, Urine 75 Andrea/µL (A) NEGATIVE   Screen Opiate/Opioid/Benzo Prescription Compliance   Result Value Ref Range    Creatinine, Urine Random 102.3 20.0 - 370.0 mg/dL    Amphetamine Screen, Urine Presumptive Negative Presumptive Negative    Barbiturate Screen, Urine Presumptive Negative Presumptive Negative    Cannabinoid Screen, Urine Presumptive Positive (A) Presumptive Negative    Cocaine Metabolite Screen, Urine Presumptive Negative Presumptive Negative    PCP Screen, Urine Presumptive Negative Presumptive Negative   Microscopic Only, Urine   Result Value Ref Range    WBC, Urine 21-50 (A) 1-5, NONE /HPF    RBC, Urine 1-2 NONE, 1-2, 3-5 /HPF   CBC and Auto Differential   Result Value Ref Range    WBC 13.8 (H) 4.4 - 11.3 x10*3/uL    nRBC 4.4 (H) 0.0 - 0.0 /100 WBCs    RBC 2.61 (L) 4.50 - 5.90 x10*6/uL    Hemoglobin 7.6 (L) 13.5 - 17.5 g/dL    Hematocrit 23.5 (L) 41.0 - 52.0 %    MCV 90 80 - 100 fL    MCH 29.1 26.0 - 34.0 pg    MCHC 32.3 32.0 -  36.0 g/dL    RDW 24.6 (H) 11.5 - 14.5 %    Platelets 421 150 - 450 x10*3/uL    Immature Granulocytes %, Automated 4.8 (H) 0.0 - 0.9 %    Immature Granulocytes Absolute, Automated 0.66 0.00 - 0.70 x10*3/uL   Comprehensive Metabolic Panel   Result Value Ref Range    Glucose 77 74 - 99 mg/dL    Sodium 140 136 - 145 mmol/L    Potassium 4.7 3.5 - 5.3 mmol/L    Chloride 108 (H) 98 - 107 mmol/L    Bicarbonate 23 21 - 32 mmol/L    Anion Gap 14 10 - 20 mmol/L    Urea Nitrogen 6 6 - 23 mg/dL    Creatinine 0.59 0.50 - 1.30 mg/dL    eGFR >90 >60 mL/min/1.73m*2    Calcium 9.3 8.6 - 10.6 mg/dL    Albumin 4.2 3.4 - 5.0 g/dL    Alkaline Phosphatase 195 (H) 33 - 120 U/L    Total Protein 6.5 6.4 - 8.2 g/dL    AST 60 (H) 9 - 39 U/L    Bilirubin, Total 4.7 (H) 0.0 - 1.2 mg/dL    ALT 46 10 - 52 U/L   Lactate Dehydrogenase   Result Value Ref Range     (H) 84 - 246 U/L   Reticulocytes   Result Value Ref Range    Retic % 22.0 (H) 0.5 - 2.0 %    Retic Absolute 0.574 (H) 0.022 - 0.118 x10*6/uL    Reticulocyte Hemoglobin 29 28 - 38 pg    Immature Retic fraction 34.0 (H) <=16.0 %   Manual Differential   Result Value Ref Range    Neutrophils %, Manual 64.1 40.0 - 80.0 %    Lymphocytes %, Manual 28.2 13.0 - 44.0 %    Monocytes %, Manual 0.0 2.0 - 10.0 %    Eosinophils %, Manual 1.7 0.0 - 6.0 %    Basophils %, Manual 0.9 0.0 - 2.0 %    Atypical Lymphocytes %, Manual 1.7 0.0 - 2.0 %    Myelocytes %, Manual 3.4 0.0 - 0.0 %    Seg Neutrophils Absolute, Manual 8.85 (H) 1.20 - 7.00 x10*3/uL    Lymphocytes Absolute, Manual 3.89 1.20 - 4.80 x10*3/uL    Monocytes Absolute, Manual 0.00 (L) 0.10 - 1.00 x10*3/uL    Eosinophils Absolute, Manual 0.23 0.00 - 0.70 x10*3/uL    Basophils Absolute, Manual 0.12 (H) 0.00 - 0.10 x10*3/uL    Atypical Lymphs Absolute, Manual 0.23 0.00 - 0.50 x10*3/uL    Myelocytes Absolute, Manual 0.47 0.00 - 0.00 x10*3/uL    Total Cells Counted 117     RBC Morphology See Below     Polychromasia Mild     Sickle Cells Many          Physical Exam                                                                                                                         Gen -  No acute distress, uncomfortable      Neuro - Alert, oriented, conversant     CV -  Bradycardic 50s     Pulm - Symmetric chest rise, non-labored breathing 2L NC. Chest pain likely due to sickel cell.      Abd - Soft, non-tender, non-distended     Ext - Warm, well perfused     Skin - without jaunice       Psych - appropriate tone, affect      -   partially erect phallus that is slightly bendable. Mildly tender to movement      Imaging  XR chest 1 view    Result Date: 9/12/2024  Interpreted By:  Sravanthi Combs and Hofer Lindsay STUDY: XR CHEST 1 VIEW;  9/12/2024 8:14 pm   INDICATION: Signs/Symptoms:cp/sob.   COMPARISON: Chest radiograph 08/24/2024   ACCESSION NUMBER(S): [id number]   ORDERING CLINICIAN: MAKR BLACKWELL   FINDINGS: AP radiograph of the chest was provided.   CARDIOMEDIASTINAL SILHOUETTE: Cardiomediastinal silhouette is normal in size and configuration.   LUNGS: No pulmonary consolidation. No pleural effusion or pneumothorax.   ABDOMEN: No remarkable upper abdominal findings.   BONES: No acute osseous changes.       1.  No acute cardiopulmonary process.   I personally reviewed the images/study and resident's interpretation and I agree with the findings as stated by Rose English MD (resident radiologist). This study was analyzed and interpreted at [facility], [place], [place].   MACRO: None   Signed by: Sravanthi Combs 9/12/2024 10:22 PM Dictation workstation:   [id number]      Assessment & Plan   23 y.o.  male w/PMHx most notable for sickle cell disease with recurrent priapism, and nodular lymphocyte predominant Hodgkin's lymphoma, who initially presented with chest pain and penile pain due to sickle cell pain crisis. Urology consulted for evaluation and management recommendations for priapism.     Plan:  -  No drainage/aspiration indicated at this time because patient not amendable. Penis currently bendable. May reconsider if clinical course changes.   - if penis is no longer bendable after period of observation, will consider phenylephrine injection and further interventions  - Urology may attempt additional treatment with ketoconazole   - Recommend STD testing for milky type discharge from tip of penis   -Rest of care per primary   -Urology will continue to follow     Assessment and plan discussed with chief resident Dr. Can and attending Dr Gretchen Kenney MD   [facility] [place]  Adult Urology Pager: [phone]  Pediatric Urology Pager: [phone]

## 2024-09-13 NOTE — SIGNIFICANT EVENT
Update on Apheresis line placement    Plan was to have apheresis line placed in the ED for urgent exchange for un resolving priapism. Patient decline the line placement in the ED because he would like the line to be placed in a sterile room which is unavailable in the ED at this time. This provider went down to the ED, to explain to the patient that transfusion medicine is waiting on the line to be placed to start his exchange. Patient again refuse to have the line placed until he has a sterile room. Patient was informed that the MICU has no bed available so even if he came up to a room it was be very challenging for him to get the line placed and probably will not be done until Monday if its not done now in the ED. He understands that an exchange can potentially has some impact with improvement of priapism and was advised to have the ED placed the line however, he said he would like to wait until Monday for his apheresis line to be placed by [facility]  Heme team and transfusion medicine has been updated.    Adequate: hears normal conversation without difficulty

## 2024-09-13 NOTE — PROGRESS NOTES
This is a 23-year-old male with past medical history of sickle cell disease with recurrent priapism as well as Hodgkin's lymphoma who presents to the ED with sickle cell pain crisis that began around 5 hours prior to arrival to the ED as well as priapism.  This patient was a signout to me and during the time of signout patient was pending reassessment.  After reassessing patient, patient is still in pain.  Patient was hemodynamically stable and in acute pain.  Patient was then admitted to heme-onc team for further treatment.
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
## 2024-09-13 NOTE — ED PROVIDER NOTES
"This is a 23-year-old male with past medical history of Hodgkin's lymphoma not currently undergoing therapy as well as sickle cell disease with recurrent priapism who presents to the ED with sickle cell pain crisis and priapism.  He states that he is currently having pain in his chest, abdomen, and penis.  He states his pain began approximately 5 hours prior to arrival as did his priapism.  He states this is his typical sickle cell pain.  He states that he took his home oxycodone around 5 PM without relief of his symptoms.  He does endorse some mild shortness of breath.  He denies any cough.  He denies any fevers or chills.  He denies any nausea, vomiting, diarrhea.  He states that he has been able to urinate.  He denies any other symptoms or complaints.  He denies any known sick contacts.      History provided by:  Patient   used: No             Visit Vitals  /61 (BP Location: Right arm, Patient Position: Sitting)   Pulse 97   Temp 37.5 °C (99.5 °F) (Oral)   Resp 18   Ht 1.88 m (6' 2\")   Wt 69.4 kg (153 lb)   SpO2 94%   BMI 19.64 kg/m²   Smoking Status Every Day   BSA 1.9 m²          Physical Exam     Physical exam:   General: Vitals noted, no distress. Afebrile.   EENT:  Hearing grossly intact. Normal phonation. MMM. Airway patient. PERRL. EOMI.   Neck: No midline tenderness or paraspinal tenderness. FROM.   Cardiac: Regular, rate, rhythm. Normal S1 and S2.  No murmurs, gallops, rubs.   Pulmonary: Good air exchange. Lungs clear bilaterally. No wheezes, rhonchi, rales. No accessory muscle use.   Abdomen: Soft, nonsurgical.  Diffuse tenderness palpation without any specific areas of point tenderness.  No peritoneal signs. Normoactive bowel sounds.   Back: No CVA tenderness. No midline tenderness or paraspinal tenderness. No obvious deformity.   : Partially erect penis that is lightly bendable.  Extremities: No peripheral edema.  Full range of motion. Moves all extremities freely. No " tenderness throughout extremities.   Skin: No rash. Warm and Dry.   Neuro: No focal neurologic deficits. CN 2-12 grossly intact. Sensation equal bilaterally. No weakness.         Labs Reviewed   RETICULOCYTES - Abnormal       Result Value    Retic % 21.3 (*)     Retic Absolute 0.606 (*)     Reticulocyte Hemoglobin 30      Immature Retic fraction 35.0 (*)    COMPREHENSIVE METABOLIC PANEL - Abnormal    Glucose 92      Sodium 140      Potassium 4.6      Chloride 106      Bicarbonate 25      Anion Gap 14      Urea Nitrogen 6      Creatinine 0.62      eGFR >90      Calcium 10.0      Albumin 4.6      Alkaline Phosphatase 239 (*)     Total Protein 7.4      AST 64 (*)     Bilirubin, Total 6.0 (*)     ALT 55 (*)    CBC WITH AUTO DIFFERENTIAL - Abnormal    WBC 15.5 (*)     nRBC 4.0 (*)     RBC 2.85 (*)     Hemoglobin 8.3 (*)     Hematocrit 25.9 (*)     MCV 91      MCH 29.1      MCHC 32.0      RDW 25.0 (*)     Platelets 431      Immature Granulocytes %, Automated 5.9 (*)     Immature Granulocytes Absolute, Automated 0.92 (*)     Narrative:     The previously reported component Neutrophils % is no longer being reported.  The previously reported component Lymphocytes % is no longer being reported.  The previously reported component Monocytes % is no longer being reported.  The previously   reported component Eosinophils % is no longer being reported.  The previously reported component Basophils % is no longer being reported.  The previously reported component Absolute Neutrophils is no longer being reported.  The previously reported   component Absolute Lymphocytes is no longer being reported.  The previously reported component Absolute Monocytes is no longer being reported.  The previously reported component Absolute Eosinophils is no longer being reported.  The previously reported   component Absolute Basophils is no longer being reported.   LACTATE DEHYDROGENASE - Abnormal     (*)    MANUAL DIFFERENTIAL - Abnormal     Neutrophils %, Manual 74.3      Lymphocytes %, Manual 13.7      Monocytes %, Manual 2.6      Eosinophils %, Manual 1.7      Basophils %, Manual 0.0      Atypical Lymphocytes %, Manual 4.3      Metamyelocytes %, Manual 0.8      Myelocytes %, Manual 2.6      Seg Neutrophils Absolute, Manual 11.52 (*)     Lymphocytes Absolute, Manual 2.12      Monocytes Absolute, Manual 0.40      Eosinophils Absolute, Manual 0.26      Basophils Absolute, Manual 0.00      Atypical Lymphs Absolute, Manual 0.67 (*)     Metamyelocytes Absolute, Manual 0.12      Myelocytes Absolute, Manual 0.40      Total Cells Counted 117      RBC Morphology See Below      Polychromasia Mild      Sickle Cells Many      Ovalocytes Few      Maurizio Cells Few      Acanthocytes Few      Basophilic Stippling Present     TROPONIN I, HIGH SENSITIVITY - Normal    Troponin I, High Sensitivity (CMC) 3      Narrative:     Less than 99th percentile of normal range cutoff-  Female and children under 18 years old <35 ng/L; Male <54 ng/L: Negative  Repeat testing should be performed if clinically indicated.     Female and children under 18 years old  ng/L; Male  ng/L:  Consistent with possible cardiac damage and possible increased clinical   risk. Serial measurements may help to assess extent of myocardial damage.     >120 ng/L: Consistent with cardiac damage, increased clinical risk and  myocardial infarction. Serial measurements may help assess extent of   myocardial damage.      NOTE: Children less than 1 year old may have higher baseline troponin   levels and results should be interpreted in conjunction with the overall   clinical context.    NOTE: Troponin I testing is performed using a different   testing methodology at [facility] than at other   [facility]. Direct result comparisons should only   be made within the same method.     URINALYSIS WITH REFLEX CULTURE AND MICROSCOPIC    Narrative:     The following orders were created for  panel order Urinalysis with Reflex Culture and Microscopic.  Procedure                               Abnormality         Status                     ---------                               -----------         ------                     Urinalysis with Reflex C...[[id number]]                                                 Extra Urine Gray Tube[[id number]]                                                         Please view results for these tests on the individual orders.   URINALYSIS WITH REFLEX CULTURE AND MICROSCOPIC   EXTRA URINE GRAY TUBE       XR chest 1 view                 ED Course & MDM     Medical Decision Making  This is a 23-year-old male with past medical history of sickle cell disease with recurrent priapism as well as Hodgkin's lymphoma who presents to the ED with sickle cell pain crisis that began around 5 hours prior to arrival to the ED as well as priapism.  Vital stable upon arrival to the ED.  On physical examination lungs clear to auscultation.  No audible cardiac murmurs.  Patient did have diffuse abdominal tenderness palpation without any rebound or guarding.  No CVA tenderness.  On examination of his genitals with a chaperone present he did have a partially erect penis that was slightly bendable.  No wounds or ulcerations noted.  Laboratory studies ordered as well as chest x-ray.  EKG ordered.  Patient ordered Dilaudid 1 mg x 3 doses as well as Toradol and Sudafed for his symptoms.  Urology consulted for his priapism as patient states that he has had improvement of his symptoms with phenylephrine injections into his penis in the past.  Initially patient was refusing to have any IV that was not ultrasound guided.  When the ED staff member went to place the patient's ultrasound-guided IV he requested that he she stopped because he felt it was too painful.  Because of this there was a slight delay in obtaining IV access as well as laboratory studies.  Following this we were able to obtain IV access  without an ultrasound fairly quickly and laboratory studies sent to the lab.  Patient declined for urology to obtain an ABG from his penis.  Urology did not feel he needed drainage at this time.  As the patient was not fully erect they did not recommend phenylephrine injection at this time, however this could be reevaluated later in the patient's ED stay if necessary.  On my reassessment after his first dose of Dilaudid and Toradol he was still having pain but did endorse mild improvement of his pain.  Patient's laboratory studies showed his hemoglobin was 8.3, this appears to be consistent with his baseline.  Patient also had a leukocytosis of 15.5.  No obvious source of infection at this time.  Chest x-ray had been obtained and showed no acute cardiopulmonary process.  Urinalysis ordered.  Patient afebrile.  CMP showed slight elevation of patient's LFTs but otherwise grossly unremarkable.  Normal troponin.  LDH elevated at 476.  Reticulocyte percent 21.3.  Patient signed out to oncoming team pending reevaluation after his second and third doses of IV Dilaudid and his UA results.    Amount and/or Complexity of Data Reviewed  Labs: ordered.  Radiology: ordered and independent interpretation performed.     Details: No visualized pneumonia or pneumothorax  ECG/medicine tests: ordered and independent interpretation performed.     Details: EKG normal sinus rhythm at 99 bpm without any acute ST elevation or depression.  T wave inversions present in lead III. This appears to be grossly unchanged when compared to EKG from August 23, 2024.              This was a shared visit with an ED attending.  The patient was seen and discussed with the ED attending    Procedures    Slime Paul, ROSA, MARIA R Paul PA-C  09/12/24 1723

## 2024-09-13 NOTE — PROGRESS NOTES
Pharmacy Medication History Review    Joellen Narayan is a 23 y.o. male admitted for Sickle cell crisis (Multi). Pharmacy reviewed the patient's [id number] medications and allergies for accuracy.    Medications ADDED:  NONE   Medications CHANGED:  NONE   Medications REMOVED:   NONE     The list below reflects the updated PTA list.   Prior to Admission Medications   Prescriptions Last Dose Informant   baclofen (Lioresal) 5 mg tablet 9/12/2024 Self   Sig: Take 1 tablet (5 mg) by mouth 3 times a day.   flutamide (Eulexin) 125 mg capsule Unknown at will start at discharge Self   Sig: Take 1 capsule (125 mg total) by mouth 3 times a day.  Take with a full glass of water.   folic acid (Folvite) 1 mg tablet 9/12/2024 Self   Sig: Take 1 tablet (1 mg) by mouth once daily for 28 days.   gabapentin (Neurontin) 100 mg capsule 9/12/2024 Self   Sig: Take 1 capsule (100 mg) by mouth every 8 hours.   oxyCODONE (Roxicodone) 15 mg immediate release tablet 9/12/2024 Self   Sig: Take 1 tablet (15 mg) by mouth every 6 hours if needed (Severe sickle cell pain) for up to 5 days.   pantoprazole (ProtoNix) 20 mg EC tablet 9/12/2024 Self   Sig: Take 1 tablet (20 mg) by mouth once daily in the morning. Take before meals. Do not crush, chew, or split.   pseudoephedrine (Sudafed) 60 mg tablet 9/12/2024 Self   Sig: Take 1 tablet (60 mg) by mouth every 8 hours if needed for congestion for up to 10 days.      Facility-Administered Medications: None        The list below reflects the updated allergy list. Please review each documented allergy for additional clarification and justification.  Allergies  Reviewed by Magan Patterson on 9/13/2024        Severity Reactions Comments    Cefepime Medium Hallucinations     Amoxicillin Low Hives     Ceftriaxone Low Hives, Rash             Patient declines M2B at discharge.     Sources:   Patient interview   Epic dispense history   Epic allergy list   [facility]   7/8/2024 progress note pharmacy ( [facility]  ")    Additional Comments:  Patient is a good historian - patient knows medication name dose and frequency   Patient takes pantoprazole 20 mg differently than prescribed - patient prescribed pantoprazole 20 mg one tab po every day - patient instead takes  pantoprazole 20 mg prn   O[facility]RS   9/6/2024 GABAPENTIN 100 MG DS: 30 QTY: 90  8/21/2024 OXYCODONE 15 MG DS: 5 QTY: 20  9/9/2024 OXYCODONE 15 MG DS: 5 QTY: 20     Magan Patterson  Pharmacy Technician  09/13/24     Secure Chat preferred   If no response call [id number] or Vocera \"Med Rec\"   "

## 2024-09-13 NOTE — H&P
History Of Present Illness  Joellen Narayan is a 23 y.o. 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 Urology was consulted in the ED and patient and mom do not agree to bedside drainage and corporal blood gas but may agree to Phenylephrine injection if absolutely necessary. Patient is also currently having pain in his chest and abdomen, typical of his sickle cell pain. He stated that he took his home Oxycodone without relief. Given ongoing pain after 3 doses of Dilaudid in the ED, he is now admitted for further care and management to [facility].    He was recently admitted with the same complaint of ongoing priapism 8/31-9/9 requiring attempted drainage in ED with Urology, patient unable to tolerate so was given ketamine. Priapism initially resolved without drainage upon ketamine infusion but returned a few hours later and he was taken to OR for drainage on 8/31. Priapism continued on 9/1, requiring multiple doses of Sudafed without resolution. Heme consulted 9/1 for RBCEx given persistent priapism, rec no RBCEx at this time, scheduled Sudafed q8h and added ibuprofen, baclofen 5mg TID and gabapentin 100mg TID. Penile doppler US (9/1) showing small caliber of bilateral cavernosal arteries with lack of color flow in portions of both arteries, more on the right, c/w low-flow/ischemic priapism. Urology notified, s/p phenylephrine injection with urology 9/1 evening with partial resolution of priapism. Urology was called to bedside 9/7 to reevaluate penile appearance; firm  but still bendable with no increased pain. Rec to continue management as prior and to maintain outpatient appointment on 9/10/24.    At outpatient appointment, they started Flutamide 125mg TID as a strategy to stop erections in the short term (long term strategy TBD) and to continue Gabapentin and Baclofen. Urology offered the patient Phenylephrine injection in the office and patient declined. Per patients' mother, Flutamide was not available at any pharmacy.     Upon admission, patient states that his priapism never really resolved since leaving the hospital after his discharge. He also admits to chest pain and right sided abdominal pain that is not new for him. He also has a headache. He denies fever, chills, cough, congestion, N/V/C/D. Patient additionally denies recent sick contacts, dizziness, visual disturbances, nasal congestion, sore throat, dysphagia, palpitations, wheezing, dyspnea, hemoptysis,  melena, hematochezia, urinary s/s, hematuria, weakness, bleeding, bruising, rash, pruritus. All other ROS otherwise negative.    ED COURSE:  Vitals: 37.5, 97, 18, 104/61, 94% RA  Labs: CBC-WBC 15.5, H&H 8.3/25.9, plt 431. CMP-Alk phos 239, AST/ALT 64/55, T. Bili 6.0. . Trop neg. Retic # 0.606, Retic % 21.3  Imaging: CXR-neg  Interventions: Dilaudid 1mg IV x 3, Toradol 15mg IV x 1, LR x 1L, Pseudoephedrine 60mg PO x 1         Past Medical History  He has a past medical history of Corrosion of unspecified body region, unspecified degree (12/31/2014), Impetigo (01/04/2024), Personal history of diseases of the blood and blood-forming organs and certain disorders involving the immune mechanism, Personal history of other (healed) physical injury and trauma (01/03/2015), Personal history of other diseases of the circulatory system, Personal history of other diseases of the circulatory system, Rash and other nonspecific skin eruption (09/09/2014), and Sickle-cell disease with pain (Multi) (12/19/2023).    Surgical  History  He has a past surgical history that includes IR CVC tunneled (6/21/2022); CT guided percutaneous biopsy LYMPH node superficial (11/18/2022); and CT guided percutaneous abdominal retroperitoneum biopsy (11/30/2023).    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 He has been exposed to tobacco smoke. He has never used smokeless tobacco. He reports current drug use. Drug: Marijuana. He reports that he does not drink alcohol.     Allergies  Cefepime, Amoxicillin, and Ceftriaxone    Review of Systems   Constitutional:  Negative for chills and fever.   HENT: Negative.     Eyes: Negative.    Respiratory:  Negative for cough, shortness of breath and wheezing.    Cardiovascular:  Positive for chest pain. Negative for palpitations and leg swelling.   Gastrointestinal:  Positive for abdominal pain. Negative for abdominal distention, blood in stool, constipation, diarrhea, nausea and vomiting.   Endocrine: Negative.    Genitourinary:  Positive for penile pain. Negative for dysuria, frequency, hematuria and urgency.   Musculoskeletal:  Negative for back pain.   Skin: Negative.    Allergic/Immunologic: Negative.    Neurological:  Positive for headaches. Negative for dizziness, light-headedness and numbness.   Hematological: Negative.    Psychiatric/Behavioral: Negative.          Physical Exam  Constitutional:       General: He is not in acute distress.     Appearance: He is not ill-appearing.   Eyes:      Pupils: Pupils are equal, round, and reactive to light.   Cardiovascular:      Rate and Rhythm: Normal rate and regular rhythm.  "  Pulmonary:      Effort: Pulmonary effort is normal. No respiratory distress.      Breath sounds: Normal breath sounds. No wheezing, rhonchi or rales.   Abdominal:      Palpations: Abdomen is soft.      Tenderness: There is abdominal tenderness.      Comments: RUQ   Genitourinary:     Comments:  partially erect phallus that is easily bendable in every direction.  Musculoskeletal:         General: Normal range of motion.      Right lower leg: No edema.      Left lower leg: No edema.   Skin:     General: Skin is warm and dry.   Neurological:      General: No focal deficit present.      Mental Status: He is oriented to person, place, and time.          Last Recorded Vitals  Blood pressure 127/61, pulse 70, temperature 37.5 °C (99.5 °F), temperature source Oral, resp. rate 16, height 1.88 m (6' 2\"), weight 69.4 kg (153 lb), SpO2 99%.    Relevant Results        Scheduled medications  baclofen, 5 mg, oral, TID  flutamide, 125 mg, oral, TID  folic acid, 1 mg, oral, Daily  gabapentin, 100 mg, oral, q8h REYNALDO  ketorolac, 30 mg, intravenous, q6h  oxygen, , inhalation, Continuous - Inhalation  pantoprazole, 20 mg, oral, Daily before breakfast      Continuous medications     PRN medications  PRN medications: diphenhydrAMINE, docusate sodium **OR** polyethylene glycol, HYDROmorphone, ondansetron ODT **OR** ondansetron, oxygen, pseudoephedrine  XR chest 1 view    Result Date: 9/12/2024  Interpreted By:  Sravanthi Combs and Hofer Lindsay STUDY: XR CHEST 1 VIEW;  9/12/2024 8:14 pm   INDICATION: Signs/Symptoms:cp/sob.   COMPARISON: Chest radiograph 08/24/2024   ACCESSION NUMBER(S): [id number]   ORDERING CLINICIAN: MARK BLACKWELL   FINDINGS: AP radiograph of the chest was provided.   CARDIOMEDIASTINAL SILHOUETTE: Cardiomediastinal silhouette is normal in size and configuration.   LUNGS: No pulmonary consolidation. No pleural effusion or pneumothorax.   ABDOMEN: No remarkable upper abdominal findings.   BONES: No acute osseous " changes.       1.  No acute cardiopulmonary process.   I personally reviewed the images/study and resident's interpretation and I agree with the findings as stated by Rose English MD (resident radiologist). This study was analyzed and interpreted at [facility], [place], [place].   MACRO: None   Signed by: Sravanthi Combs 9/12/2024 10:22 PM Dictation workstation:   [id number]       Results for orders placed or performed during the hospital encounter of 09/12/24 (from the past 24 hour(s))   Reticulocytes   Result Value Ref Range    Retic % 21.3 (H) 0.5 - 2.0 %    Retic Absolute 0.606 (H) 0.022 - 0.118 x10*6/uL    Reticulocyte Hemoglobin 30 28 - 38 pg    Immature Retic fraction 35.0 (H) <=16.0 %   Comprehensive Metabolic Panel   Result Value Ref Range    Glucose 92 74 - 99 mg/dL    Sodium 140 136 - 145 mmol/L    Potassium 4.6 3.5 - 5.3 mmol/L    Chloride 106 98 - 107 mmol/L    Bicarbonate 25 21 - 32 mmol/L    Anion Gap 14 10 - 20 mmol/L    Urea Nitrogen 6 6 - 23 mg/dL    Creatinine 0.62 0.50 - 1.30 mg/dL    eGFR >90 >60 mL/min/1.73m*2    Calcium 10.0 8.6 - 10.6 mg/dL    Albumin 4.6 3.4 - 5.0 g/dL    Alkaline Phosphatase 239 (H) 33 - 120 U/L    Total Protein 7.4 6.4 - 8.2 g/dL    AST 64 (H) 9 - 39 U/L    Bilirubin, Total 6.0 (H) 0.0 - 1.2 mg/dL    ALT 55 (H) 10 - 52 U/L   CBC and Auto Differential   Result Value Ref Range    WBC 15.5 (H) 4.4 - 11.3 x10*3/uL    nRBC 4.0 (H) 0.0 - 0.0 /100 WBCs    RBC 2.85 (L) 4.50 - 5.90 x10*6/uL    Hemoglobin 8.3 (L) 13.5 - 17.5 g/dL    Hematocrit 25.9 (L) 41.0 - 52.0 %    MCV 91 80 - 100 fL    MCH 29.1 26.0 - 34.0 pg    MCHC 32.0 32.0 - 36.0 g/dL    RDW 25.0 (H) 11.5 - 14.5 %    Platelets 431 150 - 450 x10*3/uL    Immature Granulocytes %, Automated 5.9 (H) 0.0 - 0.9 %    Immature Granulocytes Absolute, Automated 0.92 (H) 0.00 - 0.70 x10*3/uL   Lactate Dehydrogenase   Result Value Ref Range     (H) 84 - 246 U/L   Troponin I, High Sensitivity    Result Value Ref Range    Troponin I, High Sensitivity (CMC) 3 0 - 53 ng/L   Manual Differential   Result Value Ref Range    Neutrophils %, Manual 74.3 40.0 - 80.0 %    Lymphocytes %, Manual 13.7 13.0 - 44.0 %    Monocytes %, Manual 2.6 2.0 - 10.0 %    Eosinophils %, Manual 1.7 0.0 - 6.0 %    Basophils %, Manual 0.0 0.0 - 2.0 %    Atypical Lymphocytes %, Manual 4.3 0.0 - 2.0 %    Metamyelocytes %, Manual 0.8 0.0 - 0.0 %    Myelocytes %, Manual 2.6 0.0 - 0.0 %    Seg Neutrophils Absolute, Manual 11.52 (H) 1.20 - 7.00 x10*3/uL    Lymphocytes Absolute, Manual 2.12 1.20 - 4.80 x10*3/uL    Monocytes Absolute, Manual 0.40 0.10 - 1.00 x10*3/uL    Eosinophils Absolute, Manual 0.26 0.00 - 0.70 x10*3/uL    Basophils Absolute, Manual 0.00 0.00 - 0.10 x10*3/uL    Atypical Lymphs Absolute, Manual 0.67 (H) 0.00 - 0.50 x10*3/uL    Metamyelocytes Absolute, Manual 0.12 0.00 - 0.00 x10*3/uL    Myelocytes Absolute, Manual 0.40 0.00 - 0.00 x10*3/uL    Total Cells Counted 117     RBC Morphology See Below     Polychromasia Mild     Sickle Cells Many     Ovalocytes Few     Maurizio Cells Few     Acanthocytes Few     Basophilic Stippling Present       Assessment/Plan   Assessment & Plan  Sickle cell crisis (Multi)    Joellen Narayan is a 23 y.o. 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 Urology was consulted in the ED and patient and mom do not agree to bedside drainage and corporal blood gas but may agree to Phenylephrine injection if absolutely necessary. Patient is also currently having pain  in his chest and abdomen, typical of his sickle cell pain. He stated that he took his home Oxycodone without relief. Given ongoing pain after 3 doses of Dilaudid in the ED, he is now admitted for further care and management to [facility].     # Priapism  - Presented to the ED with worsening priapism since 5pm (hadn't resolved from last admission)  - s/p recent admission 8/31-9/9, OR with urology for priapism drainage, penile block, and corpora cavernosa irrigation  - Also, s/p scheduled Sudafed q8h and added ibuprofen, baclofen 5mg TID and gabapentin 100mg TID.   - Penile doppler US while prior inpatient (9/1) showing small caliber of bilateral cavernosal arteries with lack of color flow in portions of both arteries, more on the right, c/w low-flow/ischemic priapism. Urology notified, s/p phenylephrine injection with urology 9/1 evening with partial resolution of priapism  - Reassessed by Urology 9/7/24 reevaluate penile appearance; firm but still bendable with no increased pain. Rec to continue management as prior and to maintain outpatient appointment on 9/10/24.  - Outpatient appointment 9/10 prescribed Flutamide 125mg TID but per parent, no pharmacy has this medication so it was not started  - Doppler U/S completed outpatient 9/10 noted arterial flow  - Urology consulted in ED 9/12 and patient and mom both do not agree to bedside drainage and corporal blood gas but may agree to Phenylephrine injection if absolutely necessary- if penis is no longer bendable after period of observation, will consider phenylephrine injection and further interventions   - S/p Sudafed 60mg x 1 in ED  - Continue home Sudafed q8h prn and added ibuprofen, Baclofen 5mg TID and Gabapentin 100mg TID.  - Continued Flutamide but not in stock here    # Hgb SS with severe pain  - OARRS reviewed, no aberrancies  - No current care path  - Hgb baseline ~8.5, Hgb 8.3 (9/12)  - Tbili baseline fluctuates ~5-13, Tbili 6.0 (9/12)  - LDH baseline ~500, LDH  476 (9/12)  - On admit started IV Dilaudid 2mg q2hrs PRN severe pain (9/12-current)  - Continue folic acid 1mg daily  - Hgb S (9/12): pending  - PO Zofran PRN for opioid-induced nausea, PO Benadryl PRN for opioid-induced pruritus, Bowel regimen for opioid-induced constipation with DocuSenna 2tabs BID and Miralax daily      # Nodular lymphocyte predominant Hodgkins lymphoma (NLPHL)   - Primary Oncologist: Dr. Stoll  - Enlarged lymph nodes noted 4/1/22  - RUQ US (11/14/22) with mildly enlarged LNs in the region of the kavin hepatis  - MRI liver (11/16/22) showed re-demonstration of bulky retroperitoneal lymphadenopathy and kavin hepatic lymphadenopathy    - (11/18/22) lymph node biopsy showed atypical lymphoid infiltrate. Reviewed by Hemepath board, insufficient for lymphoma diagnosis  - PET/CT (12/6/22) showing retroperitoneal lymphadenopathy  - Followed up with Dr. Stoll (12/16/22) with plan for surg/onc consult for large tissue bx but patient missed apt and was lost to follow up  - Requested new apt with Dr. Ronnie Marte 6/19/23, patient was not seen and lost to follow up  - CT a/p (11/28/23) increased size of retroperitoneal lymph nodes reflecting extramedullary hematopoiesis I/s/o sickle cell vs lymphoma  - Paraaortic LN bx via IR (11/30/23) consistent with NLPHL. Flow: no clonal B cell or T cell population identified, lymphocyte 95%, CD3+CD4+ 68%, CD3+CD8+ 7%, CD19+ 24%  - Elevated LDH/bili partially from sickle cell disease   - Chemotherapy (R-CHOP) was discussed with primary oncologist Dr. Stoll, and decision was made to simplify his chemotherapy to Rituxan and prednisone q3 weeks mainly due to frequent sickle cell crisis  - Current chemo regimen: Rituxan and prednisone q3 weeks (C1 1/18/24, C2 2/8/24, Missed C3 d/t sickle cell pain crisis, C4 4/4/24, C5 5/16/24, C6 6/7/24)  - Per pt no longer taking Acyclovir 400mg BID prophy   - PET CT (7/25) overall showing great response to treatment with persistent  "residual viable disease involving a left common iliac node  - Last FUV with Dr. Stoll 7/25/24 rec RTC in 2 months (next FUV scheduled for 9/19)     # Choledocholithiasis  - s/p ERCP 7/18/24 with a biliary sphincterotomy where sludge and stones were removed, achieving complete clearance  - Seen by gen surg 8/8/24 Dr. Dove who rec lap cholecystectomy d/t the chance of recurrence of choledocholithiasis  - Surgery has yet to be scheduled  - Tbili 6.0 (9/12) which is markedly improved, recent peak at 52.8 prior to ERCP during recent hospital admission     # Hx of nocturnal oxygen dependence and hypoxia on room air  - Has not had home oxygen for 2-3 years   - Wean as able, encourage incentive spirometry  - Pulm FUV 8/8- \"Given his history of sickle cell disease, it is important to ensure he has formal sleep testing to look at desaturations and presence of sleep apnea despite not having a convincing history/body habitus typical for suspecting sleep apnea- patients with sickle cell have a higher prevalence of sleep disorders including nocturnal hypoxemia\"--> rec sleep referral for formal testing if deemed appropriate, ABG and O2 destat testing, and TTE with bubble study  - TTE 8/23 showing EF 60-65%, severely dilated LV and LA, + intrapulmonary shunting       DVT prophy: Lovenox subcutaneous, SCDs, encourage ambulation     DISPO:  - Full code, confirmed on admit  - DC pending improvement in pain  - SUSIE Narayan (Parent) [phone]        RAAD Pagan-CNP    "

## 2024-09-14 LAB
ALBUMIN SERPL BCP-MCNC: 4.3 G/DL (ref 3.4–5)
ALP SERPL-CCNC: 195 U/L (ref 33–120)
ALT SERPL W P-5'-P-CCNC: 44 U/L (ref 10–52)
ANION GAP SERPL CALC-SCNC: 12 MMOL/L (ref 10–20)
AST SERPL W P-5'-P-CCNC: 48 U/L (ref 9–39)
BACTERIA UR CULT: NO GROWTH
BASOPHILS # BLD MANUAL: 0 X10*3/UL (ref 0–0.1)
BASOPHILS NFR BLD MANUAL: 0 %
BILIRUB SERPL-MCNC: 5.1 MG/DL (ref 0–1.2)
BUN SERPL-MCNC: 7 MG/DL (ref 6–23)
BURR CELLS BLD QL SMEAR: ABNORMAL
C TRACH RRNA SPEC QL NAA+PROBE: NEGATIVE
CALCIUM SERPL-MCNC: 9.6 MG/DL (ref 8.6–10.6)
CHLORIDE SERPL-SCNC: 106 MMOL/L (ref 98–107)
CO2 SERPL-SCNC: 26 MMOL/L (ref 21–32)
CREAT SERPL-MCNC: 0.57 MG/DL (ref 0.5–1.3)
EGFRCR SERPLBLD CKD-EPI 2021: >90 ML/MIN/1.73M*2
EOSINOPHIL # BLD MANUAL: 0.19 X10*3/UL (ref 0–0.7)
EOSINOPHIL NFR BLD MANUAL: 1.7 %
ERYTHROCYTE [DISTWIDTH] IN BLOOD BY AUTOMATED COUNT: 24 % (ref 11.5–14.5)
GLUCOSE SERPL-MCNC: 89 MG/DL (ref 74–99)
HCT VFR BLD AUTO: 23.7 % (ref 41–52)
HGB BLD-MCNC: 8 G/DL (ref 13.5–17.5)
HGB RETIC QN: 33 PG (ref 28–38)
HOWELL-JOLLY BOD BLD QL SMEAR: PRESENT
IMM GRANULOCYTES # BLD AUTO: 0.17 X10*3/UL (ref 0–0.7)
IMM GRANULOCYTES NFR BLD AUTO: 1.5 % (ref 0–0.9)
IMMATURE RETIC FRACTION: 32.1 %
LDH SERPL L TO P-CCNC: 383 U/L (ref 84–246)
LYMPHOCYTES # BLD MANUAL: 1.27 X10*3/UL (ref 1.2–4.8)
LYMPHOCYTES NFR BLD MANUAL: 11.2 %
MCH RBC QN AUTO: 29.9 PG (ref 26–34)
MCHC RBC AUTO-ENTMCNC: 33.8 G/DL (ref 32–36)
MCV RBC AUTO: 88 FL (ref 80–100)
MONOCYTES # BLD MANUAL: 0.29 X10*3/UL (ref 0.1–1)
MONOCYTES NFR BLD MANUAL: 2.6 %
MYELOCYTES # BLD MANUAL: 0.09 X10*3/UL
MYELOCYTES NFR BLD MANUAL: 0.8 %
N GONORRHOEA DNA SPEC QL PROBE+SIG AMP: NEGATIVE
NEUTROPHILS # BLD MANUAL: 9.17 X10*3/UL (ref 1.2–7.7)
NEUTS BAND # BLD MANUAL: 1.37 X10*3/UL (ref 0–0.7)
NEUTS BAND NFR BLD MANUAL: 12.1 %
NEUTS SEG # BLD MANUAL: 7.8 X10*3/UL (ref 1.2–7)
NEUTS SEG NFR BLD MANUAL: 69 %
NRBC BLD-RTO: 5.2 /100 WBCS (ref 0–0)
PAPPENHEIMER BOD BLD QL SMEAR: PRESENT
PLATELET # BLD AUTO: 494 X10*3/UL (ref 150–450)
POLYCHROMASIA BLD QL SMEAR: ABNORMAL
POTASSIUM SERPL-SCNC: 4 MMOL/L (ref 3.5–5.3)
PROT SERPL-MCNC: 6.7 G/DL (ref 6.4–8.2)
RBC # BLD AUTO: 2.68 X10*6/UL (ref 4.5–5.9)
RBC MORPH BLD: ABNORMAL
RETICS #: 0.57 X10*6/UL (ref 0.02–0.12)
RETICS/RBC NFR AUTO: 21.1 % (ref 0.5–2)
SICKLE CELLS BLD QL SMEAR: ABNORMAL
SODIUM SERPL-SCNC: 140 MMOL/L (ref 136–145)
TARGETS BLD QL SMEAR: ABNORMAL
TOTAL CELLS COUNTED BLD: 116
VARIANT LYMPHS # BLD MANUAL: 0.29 X10*3/UL (ref 0–0.5)
VARIANT LYMPHS NFR BLD: 2.6 %
WBC # BLD AUTO: 11.3 X10*3/UL (ref 4.4–11.3)

## 2024-09-14 PROCEDURE — 2500000002 HC RX 250 W HCPCS SELF ADMINISTERED DRUGS (ALT 637 FOR MEDICARE OP, ALT 636 FOR OP/ED)

## 2024-09-14 PROCEDURE — 80053 COMPREHEN METABOLIC PANEL: CPT

## 2024-09-14 PROCEDURE — 2500000005 HC RX 250 GENERAL PHARMACY W/O HCPCS: Performed by: PHYSICIAN ASSISTANT

## 2024-09-14 PROCEDURE — 36415 COLL VENOUS BLD VENIPUNCTURE: CPT

## 2024-09-14 PROCEDURE — 1170000001 HC PRIVATE ONCOLOGY ROOM DAILY

## 2024-09-14 PROCEDURE — P9016 RBC LEUKOCYTES REDUCED: HCPCS

## 2024-09-14 PROCEDURE — 2500000001 HC RX 250 WO HCPCS SELF ADMINISTERED DRUGS (ALT 637 FOR MEDICARE OP)

## 2024-09-14 PROCEDURE — P9040 RBC LEUKOREDUCED IRRADIATED: HCPCS

## 2024-09-14 PROCEDURE — 85027 COMPLETE CBC AUTOMATED: CPT

## 2024-09-14 PROCEDURE — 2500000004 HC RX 250 GENERAL PHARMACY W/ HCPCS (ALT 636 FOR OP/ED)

## 2024-09-14 PROCEDURE — 85045 AUTOMATED RETICULOCYTE COUNT: CPT

## 2024-09-14 PROCEDURE — 36430 TRANSFUSION BLD/BLD COMPNT: CPT

## 2024-09-14 PROCEDURE — 99233 SBSQ HOSP IP/OBS HIGH 50: CPT | Performed by: HOSPITALIST

## 2024-09-14 PROCEDURE — 85007 BL SMEAR W/DIFF WBC COUNT: CPT

## 2024-09-14 PROCEDURE — 99223 1ST HOSP IP/OBS HIGH 75: CPT | Performed by: STUDENT IN AN ORGANIZED HEALTH CARE EDUCATION/TRAINING PROGRAM

## 2024-09-14 PROCEDURE — 83615 LACTATE (LD) (LDH) ENZYME: CPT

## 2024-09-14 RX ORDER — ONDANSETRON 4 MG/1
4 TABLET, FILM COATED ORAL EVERY 8 HOURS PRN
Status: DISCONTINUED | OUTPATIENT
Start: 2024-09-14 | End: 2024-09-14

## 2024-09-14 RX ORDER — PSEUDOEPHEDRINE HCL 30 MG
60 TABLET ORAL EVERY 8 HOURS
Status: DISCONTINUED | OUTPATIENT
Start: 2024-09-14 | End: 2024-09-28 | Stop reason: HOSPADM

## 2024-09-14 RX ORDER — HYDROMORPHONE HYDROCHLORIDE 2 MG/1
2 TABLET ORAL ONCE
Status: COMPLETED | OUTPATIENT
Start: 2024-09-14 | End: 2024-09-14

## 2024-09-14 RX ADMIN — HYDROMORPHONE HYDROCHLORIDE 2 MG: 2 INJECTION, SOLUTION INTRAMUSCULAR; INTRAVENOUS; SUBCUTANEOUS at 10:38

## 2024-09-14 RX ADMIN — PREDNISONE 5 MG: 10 TABLET ORAL at 08:56

## 2024-09-14 RX ADMIN — GABAPENTIN 100 MG: 100 CAPSULE ORAL at 05:32

## 2024-09-14 RX ADMIN — BACLOFEN 5 MG: 10 TABLET ORAL at 08:56

## 2024-09-14 RX ADMIN — HYDROMORPHONE HYDROCHLORIDE 2 MG: 2 INJECTION, SOLUTION INTRAMUSCULAR; INTRAVENOUS; SUBCUTANEOUS at 22:06

## 2024-09-14 RX ADMIN — FOLIC ACID 1 MG: 1 TABLET ORAL at 08:56

## 2024-09-14 RX ADMIN — BACLOFEN 5 MG: 10 TABLET ORAL at 20:33

## 2024-09-14 RX ADMIN — GABAPENTIN 100 MG: 100 CAPSULE ORAL at 20:34

## 2024-09-14 RX ADMIN — KETOCONAZOLE 200 MG: 200 TABLET ORAL at 13:17

## 2024-09-14 RX ADMIN — Medication 2 L/MIN: at 08:58

## 2024-09-14 RX ADMIN — KETOROLAC TROMETHAMINE 30 MG: 30 INJECTION, SOLUTION INTRAMUSCULAR; INTRAVENOUS at 13:18

## 2024-09-14 RX ADMIN — HYDROMORPHONE HYDROCHLORIDE 2 MG: 2 TABLET ORAL at 05:32

## 2024-09-14 RX ADMIN — HYDROMORPHONE HYDROCHLORIDE 2 MG: 2 INJECTION, SOLUTION INTRAMUSCULAR; INTRAVENOUS; SUBCUTANEOUS at 19:29

## 2024-09-14 RX ADMIN — PSEUDOEPHEDRINE HCL 60 MG: 30 TABLET, FILM COATED ORAL at 15:39

## 2024-09-14 RX ADMIN — KETOROLAC TROMETHAMINE 30 MG: 30 INJECTION, SOLUTION INTRAMUSCULAR; INTRAVENOUS at 18:18

## 2024-09-14 RX ADMIN — PSEUDOEPHEDRINE HCL 60 MG: 30 TABLET, FILM COATED ORAL at 08:56

## 2024-09-14 RX ADMIN — HYDROMORPHONE HYDROCHLORIDE 2 MG: 2 INJECTION, SOLUTION INTRAMUSCULAR; INTRAVENOUS; SUBCUTANEOUS at 15:39

## 2024-09-14 RX ADMIN — PANTOPRAZOLE SODIUM 20 MG: 20 TABLET, DELAYED RELEASE ORAL at 05:32

## 2024-09-14 RX ADMIN — Medication 2 L/MIN: at 21:05

## 2024-09-14 RX ADMIN — BACLOFEN 5 MG: 10 TABLET ORAL at 15:39

## 2024-09-14 RX ADMIN — KETOCONAZOLE 200 MG: 200 TABLET ORAL at 20:34

## 2024-09-14 RX ADMIN — GABAPENTIN 100 MG: 100 CAPSULE ORAL at 13:18

## 2024-09-14 SDOH — SOCIAL STABILITY: SOCIAL INSECURITY: DO YOU FEEL ANYONE HAS EXPLOITED OR TAKEN ADVANTAGE OF YOU FINANCIALLY OR OF YOUR PERSONAL PROPERTY?: NO

## 2024-09-14 SDOH — SOCIAL STABILITY: SOCIAL INSECURITY: ARE THERE ANY APPARENT SIGNS OF INJURIES/BEHAVIORS THAT COULD BE RELATED TO ABUSE/NEGLECT?: NO

## 2024-09-14 SDOH — SOCIAL STABILITY: SOCIAL INSECURITY: DOES ANYONE TRY TO KEEP YOU FROM HAVING/CONTACTING OTHER FRIENDS OR DOING THINGS OUTSIDE YOUR HOME?: NO

## 2024-09-14 SDOH — SOCIAL STABILITY: SOCIAL INSECURITY: HAVE YOU HAD THOUGHTS OF HARMING ANYONE ELSE?: NO

## 2024-09-14 SDOH — SOCIAL STABILITY: SOCIAL INSECURITY: HAVE YOU HAD ANY THOUGHTS OF HARMING ANYONE ELSE?: NO

## 2024-09-14 SDOH — SOCIAL STABILITY: SOCIAL INSECURITY: WERE YOU ABLE TO COMPLETE ALL THE BEHAVIORAL HEALTH SCREENINGS?: YES

## 2024-09-14 SDOH — SOCIAL STABILITY: SOCIAL INSECURITY: ABUSE: ADULT

## 2024-09-14 SDOH — SOCIAL STABILITY: SOCIAL INSECURITY: DO YOU FEEL UNSAFE GOING BACK TO THE PLACE WHERE YOU ARE LIVING?: NO

## 2024-09-14 SDOH — SOCIAL STABILITY: SOCIAL INSECURITY: HAS ANYONE EVER THREATENED TO HURT YOUR FAMILY OR YOUR PETS?: NO

## 2024-09-14 SDOH — SOCIAL STABILITY: SOCIAL INSECURITY: ARE YOU OR HAVE YOU BEEN THREATENED OR ABUSED PHYSICALLY, EMOTIONALLY, OR SEXUALLY BY ANYONE?: NO

## 2024-09-14 ASSESSMENT — COGNITIVE AND FUNCTIONAL STATUS - GENERAL
MOBILITY SCORE: 24
MOBILITY SCORE: 24
DAILY ACTIVITIY SCORE: 24
PATIENT BASELINE BEDBOUND: NO
MOBILITY SCORE: 24

## 2024-09-14 ASSESSMENT — PAIN - FUNCTIONAL ASSESSMENT
PAIN_FUNCTIONAL_ASSESSMENT: 0-10

## 2024-09-14 ASSESSMENT — ACTIVITIES OF DAILY LIVING (ADL)
BATHING: INDEPENDENT
JUDGMENT_ADEQUATE_SAFELY_COMPLETE_DAILY_ACTIVITIES: YES
ADEQUATE_TO_COMPLETE_ADL: YES
PATIENT'S MEMORY ADEQUATE TO SAFELY COMPLETE DAILY ACTIVITIES?: YES
DRESSING YOURSELF: INDEPENDENT
HEARING - LEFT EAR: FUNCTIONAL
TOILETING: INDEPENDENT
WALKS IN HOME: INDEPENDENT
FEEDING YOURSELF: INDEPENDENT
GROOMING: INDEPENDENT
HEARING - RIGHT EAR: FUNCTIONAL
LACK_OF_TRANSPORTATION: PATIENT DECLINED

## 2024-09-14 ASSESSMENT — LIFESTYLE VARIABLES
AUDIT-C TOTAL SCORE: 0
HOW OFTEN DO YOU HAVE 6 OR MORE DRINKS ON ONE OCCASION: NEVER
AUDIT-C TOTAL SCORE: 0
HOW OFTEN DO YOU HAVE A DRINK CONTAINING ALCOHOL: NEVER
SKIP TO QUESTIONS 9-10: 1
HOW MANY STANDARD DRINKS CONTAINING ALCOHOL DO YOU HAVE ON A TYPICAL DAY: PATIENT DOES NOT DRINK

## 2024-09-14 ASSESSMENT — PAIN DESCRIPTION - LOCATION
LOCATION: CHEST

## 2024-09-14 ASSESSMENT — PAIN SCALES - GENERAL
PAINLEVEL_OUTOF10: 9
PAINLEVEL_OUTOF10: 9
PAINLEVEL_OUTOF10: 8
PAINLEVEL_OUTOF10: 9
PAINLEVEL_OUTOF10: 9
PAINLEVEL_OUTOF10: 8
PAINLEVEL_OUTOF10: 7
PAINLEVEL_OUTOF10: 8

## 2024-09-14 NOTE — PROGRESS NOTES
"Joellen Narayan is a 23 y.o. male on day 1 of admission presenting with Sickle cell crisis (Multi).    Subjective   Patient seen resting in bed. Reports continued pain this morning in pelvic region and chest. Lost IV access last night and fell behind on his pain meds. We discussed urology recs for drainage and aspiration, pt continues to refuse. Also discussed heme recs for RBCex likely on 9/16 with IR placement of trialysis line given patient did not want trialysis line placed yesterday in the ED. Again discussed the risks of forgoing above recommendations. He is agreeable to line placement with IR on 9/16 with subsequent RBCex following line placement. Patient continues to urinate well, penis remains bendable, and unchanged from previous day. He is eating and drinking well, having bowel movements. Otherwise denies shortness of breath, F/C, H/A, N/V/D.        Objective     Physical Exam  Constitutional:       Appearance: Normal appearance.   HENT:      Nose: Nose normal.      Mouth/Throat:      Mouth: Mucous membranes are moist.   Eyes:      Pupils: Pupils are equal, round, and reactive to light.   Cardiovascular:      Rate and Rhythm: Normal rate and regular rhythm.   Pulmonary:      Effort: Pulmonary effort is normal.   Abdominal:      General: Abdomen is flat. Bowel sounds are normal.      Palpations: Abdomen is soft.   Musculoskeletal:      Cervical back: Normal range of motion and neck supple.   Skin:     General: Skin is warm.   Neurological:      General: No focal deficit present.      Mental Status: He is alert.   Psychiatric:         Mood and Affect: Mood normal.         Last Recorded Vitals  Blood pressure 129/70, pulse 71, temperature 36.9 °C (98.4 °F), temperature source Temporal, resp. rate 16, height 1.88 m (6' 2\"), weight 69.4 kg (153 lb), SpO2 93%.  Intake/Output last 3 Shifts:  No intake/output data recorded.    Relevant Results                 Scheduled medications  baclofen, 5 mg, oral, " TID  flutamide, 125 mg, oral, TID  folic acid, 1 mg, oral, Daily  gabapentin, 100 mg, oral, q8h REYNALDO  ketoconazole, 200 mg, oral, BID  ketorolac, 30 mg, intravenous, q6h  oxygen, , inhalation, Continuous - Inhalation  pantoprazole, 20 mg, oral, Daily before breakfast  predniSONE, 5 mg, oral, Daily  pseudoephedrine, 60 mg, oral, q8h      Continuous medications     PRN medications  PRN medications: diphenhydrAMINE, docusate sodium **OR** polyethylene glycol, HYDROmorphone, ondansetron ODT **OR** ondansetron, oxygen               Assessment/Plan   Assessment & Plan  Sickle cell crisis (Multi)    Joellen Narayan is a 23 y.o. 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 Urology following patient and mom do not agree to bedside drainage and corporal blood gas. 9/13 started on Ketoconazole 200 mg BID and prednisone 5 mg daily per urology recs. Hematology consulted on 9/13 give persistent priapism x1 month, recommend emergent RBCex. 9/13 plan for apheresis line placement in the ED followed by emergent RBCex, however, patient declined placement of line in ED on 9/13, plan for apheresis line placement with IR on 9/16 followed by RBCex. CXR (9/12) negative for acute cardiopulmonary process. Hemoglobin and lysis labs at patients baseline. For pain, patient started on IVP dilaudid 2mg q2 hours. Discharge pending RBCex and improvement of priapism.     # Priapism  - Presented to the ED with worsening priapism since 5pm (hadn't resolved from last admission)  - s/p recent admission  8/31-9/9, OR with urology for priapism drainage, penile block, and corpora cavernosa irrigation  - Also, s/p scheduled Sudafed q8h and added ibuprofen, baclofen 5mg TID and gabapentin 100mg TID.   - Penile doppler US while prior inpatient (9/1) showing small caliber of bilateral cavernosal arteries with lack of color flow in portions of both arteries, more on the right, c/w low-flow/ischemic priapism. Urology notified, s/p phenylephrine injection with urology 9/1 evening with partial resolution of priapism  - Reassessed by Urology 9/7/24 reevaluate penile appearance; firm but still bendable with no increased pain. Rec to continue management as prior and to maintain outpatient appointment on 9/10/24.  - Outpatient appointment 9/10 prescribed Flutamide 125mg TID but per parent, no pharmacy has this medication so it was not started  - Doppler U/S completed outpatient 9/10 noted arterial flow  - Urology following rec bedside drainage and corporal blood gas and phenylepinephine injection --patient refusing; also rec starting treatment with ketoconazole with prednisone   - Heme following and rec emergent RBCex in setting of prolonged priapism  - S/p Sudafed 60mg x 1 in ED  - Continue home Sudafed q8h prn and added ibuprofen, Baclofen 5mg TID and Gabapentin 100mg TID.  - 9/13 started on ketoconazole 200mg BID and Prednisone 5mg daily-per urology recs   - C. Trachomatis/N. Gonorrhoeae sent-pending    - Initially planned for aphesis line placement followed by RBCex on 9/13, however, patient declined placement of apheresis line in the ED, team explained risks to patient  - Plan for apheresis line placement in IR on 9/16 followed by RBCex - hematology, Dr. Cruz, and trransfusion med team aware      # Hgb SS with severe pain  - OARRS reviewed, no aberrancies  - No current care path  - Hgb 8 on 9/14, Hgb baseline ~8.5; no indication for simple transfusion   - Tbili baseline fluctuates ~5-13, Tbili 5.1 (9/14)  - LDH baseline  ~500,  (9/14)   - Continue IV Dilaudid 2mg q2hrs PRN severe pain (9/12-current)  - Continue folic acid 1mg daily  - Hgb S (9/12): 59.8%   - Heme following and rec emergent RBCex in setting of prolonged priapism (see above). Plan for RBCex on 9/16   - PO Zofran PRN for opioid-induced nausea, PO Benadryl PRN for opioid-induced pruritus, Bowel regimen for opioid-induced constipation with DocuSenna 2tabs BID and Miralax daily      # Nodular lymphocyte predominant Hodgkins lymphoma (NLPHL)   - Primary Oncologist: Dr. Stoll  - Enlarged lymph nodes noted 4/1/22  - RUQ US (11/14/22) with mildly enlarged LNs in the region of the kavni hepatis  - MRI liver (11/16/22) showed re-demonstration of bulky retroperitoneal lymphadenopathy and kavin hepatic lymphadenopathy    - (11/18/22) lymph node biopsy showed atypical lymphoid infiltrate. Reviewed by Hemepath board, insufficient for lymphoma diagnosis  - PET/CT (12/6/22) showing retroperitoneal lymphadenopathy  - Followed up with Dr. Stoll (12/16/22) with plan for surg/onc consult for large tissue bx but patient missed apt and was lost to follow up  - Requested new apt with Dr. Ronnie Marte 6/19/23, patient was not seen and lost to follow up  - CT a/p (11/28/23) increased size of retroperitoneal lymph nodes reflecting extramedullary hematopoiesis I/s/o sickle cell vs lymphoma  - Paraaortic LN bx via IR (11/30/23) consistent with NLPHL. Flow: no clonal B cell or T cell population identified, lymphocyte 95%, CD3+CD4+ 68%, CD3+CD8+ 7%, CD19+ 24%  - Elevated LDH/bili partially from sickle cell disease   - Chemotherapy (R-CHOP) was discussed with primary oncologist Dr. Stoll, and decision was made to simplify his chemotherapy to Rituxan and prednisone q3 weeks mainly due to frequent sickle cell crisis  - Current chemo regimen: Rituxan and prednisone q3 weeks (C1 1/18/24, C2 2/8/24, Missed C3 d/t sickle cell pain crisis, C4 4/4/24, C5 5/16/24, C6 6/7/24)  - Per pt no longer  "taking Acyclovir 400mg BID prophy   - PET CT (7/25) overall showing great response to treatment with persistent residual viable disease involving a left common iliac node  - Last FUV with Dr. Stoll 7/25/24 rec RTC in 2 months (next FUV scheduled for 9/19)     # Choledocholithiasis  - s/p ERCP 7/18/24 with a biliary sphincterotomy where sludge and stones were removed, achieving complete clearance  - Seen by gen surg 8/8/24 Dr. Dove who rec lap cholecystectomy d/t the chance of recurrence of choledocholithiasis  - Surgery has yet to be scheduled  - Tbili 5.1 (9/14) which is markedly improved, recent peak at 52.8 prior to ERCP during recent hospital admission     # Hx of nocturnal oxygen dependence and hypoxia on room air  - Has not had home oxygen for 2-3 years   - Wean as able, encourage incentive spirometry  - Pulm FUV 8/8- \"Given his history of sickle cell disease, it is important to ensure he has formal sleep testing to look at desaturations and presence of sleep apnea despite not having a convincing history/body habitus typical for suspecting sleep apnea- patients with sickle cell have a higher prevalence of sleep disorders including nocturnal hypoxemia\"--> rec sleep referral for formal testing if deemed appropriate, ABG and O2 destat testing, and TTE with bubble study  - TTE 8/23 showing EF 60-65%, severely dilated LV and LA, + intrapulmonary shunting       DVT prophy: Lovenox subcutaneous, SCDs, encourage ambulation     DISPO:  - Full code, confirmed on admit  - DC pending RBCex and improvement of priapism   - SUSIE Narayan (Parent) [phone]         I spent 60 minutes in the professional and overall care of this patient.    Assessment and plan as above discussed with attending physician, Dr. Bren Stoll, PAKenzieC      "

## 2024-09-14 NOTE — CARE PLAN
Problem: Pain  Goal: Takes deep breaths with improved pain control throughout the shift  Outcome: Progressing  Goal: Turns in bed with improved pain control throughout the shift  Outcome: Progressing  Goal: Walks with improved pain control throughout the shift  Outcome: Progressing  Goal: Performs ADL's with improved pain control throughout shift  Outcome: Progressing  Goal: Participates in PT with improved pain control throughout the shift  Outcome: Progressing  Goal: Free from opioid side effects throughout the shift  Outcome: Progressing  Goal: Free from acute confusion related to pain meds throughout the shift  Outcome: Progressing     Problem: Pain - Adult  Goal: Verbalizes/displays adequate comfort level or baseline comfort level  Outcome: Progressing     Problem: Safety - Adult  Goal: Free from fall injury  Outcome: Progressing     Problem: Discharge Planning  Goal: Discharge to home or other facility with appropriate resources  Outcome: Progressing     Problem: Chronic Conditions and Co-morbidities  Goal: Patient's chronic conditions and co-morbidity symptoms are monitored and maintained or improved  Outcome: Progressing       The clinical goals for the shift include pt will report pain below 8/10 throughout shift    Over the shift, the patient remained safe and free from injury. Had pain in chest, medicated PRN. Vitals remained stable. Loss IV access and unable to get new access overnight. No acute events overnight

## 2024-09-14 NOTE — PROGRESS NOTES
[facility]  Progress Note      Patient: Joellen Narayan  Age/Sex: 23 y.o., male  MRN: [id number]  Date of surgery:   Admit Date: 9/12/2024   Code Status: Full Code  Length of Stay: 1      Interval History/Overnight Events:   NAEON  Feeling better this AM  Penis still slightly bendable with some milky discharge, stable exam from yesterday       Objective  No intake/output data recorded.    Medications:  Current Facility-Administered Medications   Medication Dose Route Frequency Provider Last Rate Last Admin    baclofen (Lioresal) tablet 5 mg  5 mg oral TID Alicia L Gersin, APRN-CNP   5 mg at 09/14/24 0856    diphenhydrAMINE (BENADryl) capsule 25 mg  25 mg oral q6h PRN Alicia L Gersin, APRN-CNP        docusate sodium (Colace) capsule 100 mg  100 mg oral BID PRN Alicia L Gersin, APRN-CNP        Or    polyethylene glycol (Glycolax, Miralax) packet 17 g  17 g oral BID PRN Alicia L Gersin, APRN-CNP        flutamide (Eulexin) capsule 125 mg  125 mg oral TID Alicia L Gersin, APRN-CNP        folic acid (Folvite) tablet 1 mg  1 mg oral Daily Alicia L Gersin, APRN-CNP   1 mg at 09/14/24 0856    gabapentin (Neurontin) capsule 100 mg  100 mg oral q8h REYNALDO Alicia L Gersin, APRN-CNP   100 mg at 09/14/24 0532    HYDROmorphone PF (Dilaudid) injection 2 mg  2 mg subcutaneous q2h PRN Emilee Stoll PA-C        ketoconazole (NIZOral) tablet 200 mg  200 mg oral BID RAVI Pathak        ketorolac (Toradol) injection 30 mg  30 mg intravenous q6h Alicia L Gersin, APRN-CNP   30 mg at 09/13/24 1820    ondansetron ODT (Zofran-ODT) disintegrating tablet 8 mg  8 mg oral q8h PRN Alicia L Gersin, APRN-CNP        Or    ondansetron (Zofran) injection 8 mg  8 mg intravenous q8h PRN Alicia L Gersin, APRN-CNP        oxygen (O2) therapy   inhalation Continuous - Inhalation Slime Paul PA-C   2 L/min at 09/14/24 0858    oxygen (O2) therapy   inhalation Continuous PRN - O2/gases Alicia Fletcher, APRN-CNP         pantoprazole (ProtoNix) EC tablet 20 mg  20 mg oral Daily before breakfast Alicia Fletcher, APRN-CNP   20 mg at 09/14/24 0532    predniSONE (Deltasone) tablet 5 mg  5 mg oral Daily Marie REMI Modi, APRN-CNP   5 mg at 09/14/24 0856    pseudoephedrine (Sudafed) tablet 60 mg  60 mg oral q8h Emilee Stoll PA-C   60 mg at 09/14/24 0856     Vitals:    09/13/24 2100 09/14/24 0116 09/14/24 0349 09/14/24 0725   BP: 111/56 110/58 114/66 129/70   BP Location:  Right arm Left arm Right arm   Patient Position:  Lying Sitting Lying   Pulse: 62 75 76 71   Resp: 13 18 16 16   Temp:  37.4 °C (99.3 °F) 37.3 °C (99.1 °F) 36.9 °C (98.4 °F)   TempSrc:  Tympanic Temporal Temporal   SpO2: 97% 94% (!) 92% 93%   Weight:       Height:            Results for orders placed or performed during the hospital encounter of 09/12/24 (from the past 24 hour(s))   Prepare RBC: 5 Units   Result Value Ref Range    PRODUCT CODE H4730O30     Unit Number [id number]     Unit ABO B     Unit RH NEG     XM INTEP COMP     Dispense Status IS     Blood Expiration Date 10/10/2024 11:59:00 PM EDT     PRODUCT BLOOD TYPE 1700     UNIT VOLUME 350     PRODUCT CODE P6813H99     Unit Number [id number]     Unit ABO B     Unit RH NEG     XM INTEP COMP     Dispense Status IS     Blood Expiration Date 10/19/2024 11:59:00 PM EDT     PRODUCT BLOOD TYPE 1700     UNIT VOLUME 350     PRODUCT CODE Q2716R25     Unit Number [id number]     Unit ABO B     Unit RH NEG     XM INTEP COMP     Dispense Status IS     Blood Expiration Date 10/8/2024 11:59:00 PM EDT     PRODUCT BLOOD TYPE 1700     UNIT VOLUME 286     PRODUCT CODE K6601L51     Unit Number [id number]     Unit ABO B     Unit RH NEG     XM INTEP COMP     Dispense Status IS     Blood Expiration Date 10/11/2024 11:59:00 PM EDT     PRODUCT BLOOD TYPE 1700     UNIT VOLUME 318     PRODUCT CODE L2091O12     Unit Number [id number]     Unit ABO B     Unit RH NEG     XM INTEP COMP     Dispense Status IS      Blood Expiration Date 10/8/2024 11:59:00 PM EDT     PRODUCT BLOOD TYPE 1700     UNIT VOLUME 350    Calcium, ionized   Result Value Ref Range    POCT Calcium, Ionized 1.16 1.1 - 1.33 mmol/L   Type and screen   Result Value Ref Range    ABO TYPE B     Rh TYPE POS     ANTIBODY SCREEN NEG    Coagulation Screen   Result Value Ref Range    Protime 13.6 (H) 9.8 - 12.8 seconds    INR 1.2 (H) 0.9 - 1.1    aPTT 23 (L) 27 - 38 seconds        Physical Exam                                                                                                                         Gen -  No acute distress, uncomfortable      Neuro - Alert, oriented, conversant     CV -  RR     Pulm - Symmetric chest rise, non-labored breathing on RA.      Abd - Soft, non-tender, non-distended     Ext - Warm, well perfused     Skin - without jaunice       Psych - appropriate tone, affect      -   partially erect phallus that is slightly bendable. Mildly tender to movement      Imaging  XR chest 1 view    Result Date: 9/12/2024  Interpreted By:  Sravanthi Combs and Hofer Lindsay STUDY: XR CHEST 1 VIEW;  9/12/2024 8:14 pm   INDICATION: Signs/Symptoms:cp/sob.   COMPARISON: Chest radiograph 08/24/2024   ACCESSION NUMBER(S): [id number]   ORDERING CLINICIAN: MARK BLACKWELL   FINDINGS: AP radiograph of the chest was provided.   CARDIOMEDIASTINAL SILHOUETTE: Cardiomediastinal silhouette is normal in size and configuration.   LUNGS: No pulmonary consolidation. No pleural effusion or pneumothorax.   ABDOMEN: No remarkable upper abdominal findings.   BONES: No acute osseous changes.       1.  No acute cardiopulmonary process.   I personally reviewed the images/study and resident's interpretation and I agree with the findings as stated by Rose English MD (resident radiologist). This study was analyzed and interpreted at [facility], [place], [place].   MACRO: None   Signed by: Sravanthi Combs 9/12/2024 10:22 PM Dictation  workstation:   [id number]      Assessment & Plan   23 y.o.  male w/PMHx most notable for sickle cell disease with recurrent priapism, and nodular lymphocyte predominant Hodgkin's lymphoma, who initially presented with chest pain and penile pain due to sickle cell pain crisis. Urology consulted for evaluation and management recommendations for priapism.     9/14: Pain improved. Exam relatively stable, penis slightly bendable although slightly more erect    Plan:  - Had long conversation with patient and mother regarding recurrent ischemic priapism as well as options including PO medical management, phenylephrine injection, ABG, corporal aspiration, and surgical management including shunt and IPP. Discussed risk and benefits of each as well as the progressive damage with observation of suspected ischemic priapism. I strongly recommended treatment with at least phenylephrine injection however patient elected to wait for ketoconazole.   - Consider treatment with flutamide, if flutamide is unavailable from pharmacy can consider ketoconazole with prednisone. Patient should be discharge on one of these regimens.   - Recommend STD testing for milky type discharge from tip of penis   -Rest of care per primary   -Urology will continue to follow     Assessment and plan discussed with chief resident Dr. Can and attending Dr Gretchen Merlos MD   Urology PGY-3  Adult Urology Pager: [phone]  Pediatric Urology Pager: [phone]

## 2024-09-15 ENCOUNTER — APPOINTMENT (OUTPATIENT)
Dept: RADIOLOGY | Facility: HOSPITAL | Age: 24
DRG: 812 | End: 2024-09-15
Payer: COMMERCIAL

## 2024-09-15 VITALS
OXYGEN SATURATION: 94 % | HEIGHT: 74 IN | TEMPERATURE: 97.9 F | WEIGHT: 153 LBS | BODY MASS INDEX: 19.64 KG/M2 | RESPIRATION RATE: 16 BRPM | DIASTOLIC BLOOD PRESSURE: 60 MMHG | HEART RATE: 72 BPM | SYSTOLIC BLOOD PRESSURE: 111 MMHG

## 2024-09-15 LAB
ABO GROUP (TYPE) IN BLOOD: NORMAL
ALBUMIN SERPL BCP-MCNC: 4.2 G/DL (ref 3.4–5)
ALP SERPL-CCNC: 177 U/L (ref 33–120)
ALT SERPL W P-5'-P-CCNC: 43 U/L (ref 10–52)
ANION GAP SERPL CALC-SCNC: 10 MMOL/L (ref 10–20)
ANTIBODY SCREEN: NORMAL
AST SERPL W P-5'-P-CCNC: 44 U/L (ref 9–39)
BASO STIPL BLD QL SMEAR: PRESENT
BASOPHILS # BLD MANUAL: 0 X10*3/UL (ref 0–0.1)
BASOPHILS NFR BLD MANUAL: 0 %
BILIRUB SERPL-MCNC: 4.6 MG/DL (ref 0–1.2)
BLOOD EXPIRATION DATE: NORMAL
BUN SERPL-MCNC: 7 MG/DL (ref 6–23)
CA-I BLD-SCNC: 1.27 MMOL/L (ref 1.1–1.33)
CALCIUM SERPL-MCNC: 9.4 MG/DL (ref 8.6–10.6)
CHLORIDE SERPL-SCNC: 105 MMOL/L (ref 98–107)
CO2 SERPL-SCNC: 29 MMOL/L (ref 21–32)
CREAT SERPL-MCNC: 0.72 MG/DL (ref 0.5–1.3)
DISPENSE STATUS: NORMAL
EGFRCR SERPLBLD CKD-EPI 2021: >90 ML/MIN/1.73M*2
EOSINOPHIL # BLD MANUAL: 0.29 X10*3/UL (ref 0–0.7)
EOSINOPHIL NFR BLD MANUAL: 2.6 %
ERYTHROCYTE [DISTWIDTH] IN BLOOD BY AUTOMATED COUNT: 22.9 % (ref 11.5–14.5)
GLUCOSE SERPL-MCNC: 71 MG/DL (ref 74–99)
HCT VFR BLD AUTO: 23.9 % (ref 41–52)
HGB BLD-MCNC: 8 G/DL (ref 13.5–17.5)
HGB RETIC QN: 33 PG (ref 28–38)
IMM GRANULOCYTES # BLD AUTO: 0.12 X10*3/UL (ref 0–0.7)
IMM GRANULOCYTES NFR BLD AUTO: 1.1 % (ref 0–0.9)
IMMATURE RETIC FRACTION: 20.4 %
INR PPP: 1.3 (ref 0.9–1.1)
LDH SERPL L TO P-CCNC: 352 U/L (ref 84–246)
LYMPHOCYTES # BLD MANUAL: 1.74 X10*3/UL (ref 1.2–4.8)
LYMPHOCYTES NFR BLD MANUAL: 15.7 %
MCH RBC QN AUTO: 29.6 PG (ref 26–34)
MCHC RBC AUTO-ENTMCNC: 33.5 G/DL (ref 32–36)
MCV RBC AUTO: 89 FL (ref 80–100)
MONOCYTES # BLD MANUAL: 0.97 X10*3/UL (ref 0.1–1)
MONOCYTES NFR BLD MANUAL: 8.7 %
NEUTS SEG # BLD MANUAL: 8.1 X10*3/UL (ref 1.2–7)
NEUTS SEG NFR BLD MANUAL: 73 %
NRBC BLD-RTO: 4.2 /100 WBCS (ref 0–0)
PLATELET # BLD AUTO: 503 X10*3/UL (ref 150–450)
POLYCHROMASIA BLD QL SMEAR: ABNORMAL
POTASSIUM SERPL-SCNC: 4 MMOL/L (ref 3.5–5.3)
PRODUCT BLOOD TYPE: 1700
PRODUCT CODE: NORMAL
PROT SERPL-MCNC: 6.8 G/DL (ref 6.4–8.2)
PROTHROMBIN TIME: 14.2 SECONDS (ref 9.8–12.8)
RBC # BLD AUTO: 2.7 X10*6/UL (ref 4.5–5.9)
RBC MORPH BLD: ABNORMAL
RETICS #: 0.58 X10*6/UL (ref 0.02–0.12)
RETICS/RBC NFR AUTO: 21.5 % (ref 0.5–2)
RH FACTOR (ANTIGEN D): NORMAL
SICKLE CELLS BLD QL SMEAR: ABNORMAL
SODIUM SERPL-SCNC: 140 MMOL/L (ref 136–145)
TARGETS BLD QL SMEAR: ABNORMAL
TOTAL CELLS COUNTED BLD: 115
UNIT ABO: NORMAL
UNIT NUMBER: NORMAL
UNIT RH: NORMAL
UNIT VOLUME: 286
UNIT VOLUME: 318
UNIT VOLUME: 350
WBC # BLD AUTO: 11.1 X10*3/UL (ref 4.4–11.3)
XM INTEP: NORMAL

## 2024-09-15 PROCEDURE — 1170000001 HC PRIVATE ONCOLOGY ROOM DAILY

## 2024-09-15 PROCEDURE — 86920 COMPATIBILITY TEST SPIN: CPT

## 2024-09-15 PROCEDURE — 85045 AUTOMATED RETICULOCYTE COUNT: CPT

## 2024-09-15 PROCEDURE — 82330 ASSAY OF CALCIUM: CPT

## 2024-09-15 PROCEDURE — 83021 HEMOGLOBIN CHROMOTOGRAPHY: CPT

## 2024-09-15 PROCEDURE — 85007 BL SMEAR W/DIFF WBC COUNT: CPT

## 2024-09-15 PROCEDURE — 2500000001 HC RX 250 WO HCPCS SELF ADMINISTERED DRUGS (ALT 637 FOR MEDICARE OP)

## 2024-09-15 PROCEDURE — 83020 HEMOGLOBIN ELECTROPHORESIS: CPT | Performed by: PATHOLOGY

## 2024-09-15 PROCEDURE — 2500000004 HC RX 250 GENERAL PHARMACY W/ HCPCS (ALT 636 FOR OP/ED)

## 2024-09-15 PROCEDURE — 85610 PROTHROMBIN TIME: CPT

## 2024-09-15 PROCEDURE — 85027 COMPLETE CBC AUTOMATED: CPT

## 2024-09-15 PROCEDURE — 86901 BLOOD TYPING SEROLOGIC RH(D): CPT

## 2024-09-15 PROCEDURE — P9040 RBC LEUKOREDUCED IRRADIATED: HCPCS

## 2024-09-15 PROCEDURE — 83735 ASSAY OF MAGNESIUM: CPT | Performed by: NURSE PRACTITIONER

## 2024-09-15 PROCEDURE — 93975 VASCULAR STUDY: CPT | Performed by: RADIOLOGY

## 2024-09-15 PROCEDURE — 2500000005 HC RX 250 GENERAL PHARMACY W/O HCPCS: Performed by: PHYSICIAN ASSISTANT

## 2024-09-15 PROCEDURE — 2500000002 HC RX 250 W HCPCS SELF ADMINISTERED DRUGS (ALT 637 FOR MEDICARE OP, ALT 636 FOR OP/ED)

## 2024-09-15 PROCEDURE — 99233 SBSQ HOSP IP/OBS HIGH 50: CPT

## 2024-09-15 PROCEDURE — 93980 PENILE VASCULAR STUDY: CPT

## 2024-09-15 PROCEDURE — P9016 RBC LEUKOCYTES REDUCED: HCPCS

## 2024-09-15 PROCEDURE — 83615 LACTATE (LD) (LDH) ENZYME: CPT

## 2024-09-15 PROCEDURE — 36415 COLL VENOUS BLD VENIPUNCTURE: CPT

## 2024-09-15 PROCEDURE — 80053 COMPREHEN METABOLIC PANEL: CPT

## 2024-09-15 RX ORDER — HEPARIN SODIUM 1000 [USP'U]/ML
1000 INJECTION, SOLUTION INTRAVENOUS; SUBCUTANEOUS ONCE
Status: CANCELLED | OUTPATIENT
Start: 2024-09-16

## 2024-09-15 RX ORDER — SODIUM CHLORIDE 0.9 % (FLUSH) 0.9 %
10 SYRINGE (ML) INJECTION ONCE
Status: CANCELLED | OUTPATIENT
Start: 2024-09-16

## 2024-09-15 RX ORDER — CALCIUM CARBONATE 200(500)MG
1500 TABLET,CHEWABLE ORAL EVERY 5 MIN PRN
Status: CANCELLED | OUTPATIENT
Start: 2024-09-16

## 2024-09-15 RX ORDER — ACETAMINOPHEN 325 MG/1
650 TABLET ORAL ONCE
Status: CANCELLED | OUTPATIENT
Start: 2024-09-16

## 2024-09-15 RX ORDER — DIPHENHYDRAMINE HYDROCHLORIDE 50 MG/ML
25 INJECTION INTRAMUSCULAR; INTRAVENOUS EVERY 5 MIN PRN
Status: CANCELLED | OUTPATIENT
Start: 2024-09-16

## 2024-09-15 RX ORDER — DIPHENHYDRAMINE HCL 25 MG
25 CAPSULE ORAL ONCE
Status: CANCELLED | OUTPATIENT
Start: 2024-09-16

## 2024-09-15 RX ADMIN — BACLOFEN 5 MG: 10 TABLET ORAL at 08:44

## 2024-09-15 RX ADMIN — HYDROMORPHONE HYDROCHLORIDE 2 MG: 2 INJECTION, SOLUTION INTRAMUSCULAR; INTRAVENOUS; SUBCUTANEOUS at 06:11

## 2024-09-15 RX ADMIN — HYDROMORPHONE HYDROCHLORIDE 2 MG: 2 INJECTION, SOLUTION INTRAMUSCULAR; INTRAVENOUS; SUBCUTANEOUS at 20:21

## 2024-09-15 RX ADMIN — KETOROLAC TROMETHAMINE 30 MG: 30 INJECTION, SOLUTION INTRAMUSCULAR; INTRAVENOUS at 12:19

## 2024-09-15 RX ADMIN — HYDROMORPHONE HYDROCHLORIDE 2 MG: 2 INJECTION, SOLUTION INTRAMUSCULAR; INTRAVENOUS; SUBCUTANEOUS at 15:46

## 2024-09-15 RX ADMIN — KETOCONAZOLE 200 MG: 200 TABLET ORAL at 20:21

## 2024-09-15 RX ADMIN — BACLOFEN 5 MG: 10 TABLET ORAL at 14:26

## 2024-09-15 RX ADMIN — GABAPENTIN 100 MG: 100 CAPSULE ORAL at 06:10

## 2024-09-15 RX ADMIN — KETOROLAC TROMETHAMINE 30 MG: 30 INJECTION, SOLUTION INTRAMUSCULAR; INTRAVENOUS at 00:03

## 2024-09-15 RX ADMIN — GABAPENTIN 100 MG: 100 CAPSULE ORAL at 20:21

## 2024-09-15 RX ADMIN — HYDROMORPHONE HYDROCHLORIDE 2 MG: 2 INJECTION, SOLUTION INTRAMUSCULAR; INTRAVENOUS; SUBCUTANEOUS at 00:03

## 2024-09-15 RX ADMIN — KETOROLAC TROMETHAMINE 30 MG: 30 INJECTION, SOLUTION INTRAMUSCULAR; INTRAVENOUS at 06:11

## 2024-09-15 RX ADMIN — Medication 2 L/MIN: at 20:27

## 2024-09-15 RX ADMIN — HYDROMORPHONE HYDROCHLORIDE 2 MG: 2 INJECTION, SOLUTION INTRAMUSCULAR; INTRAVENOUS; SUBCUTANEOUS at 10:49

## 2024-09-15 RX ADMIN — KETOROLAC TROMETHAMINE 30 MG: 30 INJECTION, SOLUTION INTRAMUSCULAR; INTRAVENOUS at 18:40

## 2024-09-15 RX ADMIN — BACLOFEN 5 MG: 10 TABLET ORAL at 20:21

## 2024-09-15 RX ADMIN — Medication 21 PERCENT: at 08:51

## 2024-09-15 RX ADMIN — HYDROMORPHONE HYDROCHLORIDE 2 MG: 2 INJECTION, SOLUTION INTRAMUSCULAR; INTRAVENOUS; SUBCUTANEOUS at 03:04

## 2024-09-15 RX ADMIN — GABAPENTIN 100 MG: 100 CAPSULE ORAL at 14:26

## 2024-09-15 RX ADMIN — PSEUDOEPHEDRINE HCL 60 MG: 30 TABLET, FILM COATED ORAL at 00:03

## 2024-09-15 RX ADMIN — PANTOPRAZOLE SODIUM 20 MG: 20 TABLET, DELAYED RELEASE ORAL at 06:10

## 2024-09-15 RX ADMIN — HYDROMORPHONE HYDROCHLORIDE 2 MG: 2 INJECTION, SOLUTION INTRAMUSCULAR; INTRAVENOUS; SUBCUTANEOUS at 08:42

## 2024-09-15 RX ADMIN — FOLIC ACID 1 MG: 1 TABLET ORAL at 08:43

## 2024-09-15 RX ADMIN — PSEUDOEPHEDRINE HCL 60 MG: 30 TABLET, FILM COATED ORAL at 08:44

## 2024-09-15 RX ADMIN — PREDNISONE 5 MG: 10 TABLET ORAL at 08:43

## 2024-09-15 RX ADMIN — PSEUDOEPHEDRINE HCL 60 MG: 30 TABLET, FILM COATED ORAL at 15:46

## 2024-09-15 RX ADMIN — PSEUDOEPHEDRINE HCL 60 MG: 30 TABLET, FILM COATED ORAL at 22:59

## 2024-09-15 RX ADMIN — KETOCONAZOLE 200 MG: 200 TABLET ORAL at 08:45

## 2024-09-15 ASSESSMENT — COGNITIVE AND FUNCTIONAL STATUS - GENERAL
MOBILITY SCORE: 24
DAILY ACTIVITIY SCORE: 24
MOBILITY SCORE: 24
DAILY ACTIVITIY SCORE: 24

## 2024-09-15 ASSESSMENT — PAIN DESCRIPTION - LOCATION
LOCATION: GROIN

## 2024-09-15 ASSESSMENT — PAIN SCALES - GENERAL
PAINLEVEL_OUTOF10: 8
PAINLEVEL_OUTOF10: 8
PAINLEVEL_OUTOF10: 7
PAINLEVEL_OUTOF10: 9
PAINLEVEL_OUTOF10: 8
PAINLEVEL_OUTOF10: 9
PAINLEVEL_OUTOF10: 7

## 2024-09-15 ASSESSMENT — PAIN - FUNCTIONAL ASSESSMENT
PAIN_FUNCTIONAL_ASSESSMENT: 0-10

## 2024-09-15 NOTE — PROGRESS NOTES
"Joellen Narayan is a 23 y.o. male on day 2 of admission presenting with Sickle cell crisis (Multi).    Subjective   Patient seen resting in bed. Reports continued pain in his chest and penis, although improved from previous days. Also notes that penis is less erect today. Continues to void. We discussed plan per urology to obtain a penile doppler US today and plan for apheresis line followed by RBCex tomorrow, 9/16. Patient agreeable. He is eating and drinking well and having bowel movements. Otherwise denies any shortness of breath, abdominal pain, N/V/D, F/C, H/a.        Objective     Physical Exam  Constitutional:       Appearance: Normal appearance.   HENT:      Mouth/Throat:      Mouth: Mucous membranes are moist.   Eyes:      General: Scleral icterus present.      Pupils: Pupils are equal, round, and reactive to light.   Cardiovascular:      Rate and Rhythm: Normal rate and regular rhythm.      Pulses: Normal pulses.      Heart sounds: Normal heart sounds.   Pulmonary:      Effort: Pulmonary effort is normal.      Breath sounds: Normal breath sounds.   Abdominal:      General: Abdomen is flat. Bowel sounds are normal.      Palpations: Abdomen is soft.   Musculoskeletal:         General: Normal range of motion.      Cervical back: Normal range of motion and neck supple.   Skin:     General: Skin is warm.   Neurological:      General: No focal deficit present.      Mental Status: He is alert.   Psychiatric:         Mood and Affect: Mood normal.         Last Recorded Vitals  Blood pressure 113/60, pulse 74, temperature 36.4 °C (97.5 °F), temperature source Temporal, resp. rate 16, height 1.88 m (6' 2\"), weight 69.4 kg (153 lb), SpO2 94%.  Intake/Output last 3 Shifts:  No intake/output data recorded.    Relevant Results                 Scheduled medications  baclofen, 5 mg, oral, TID  flutamide, 125 mg, oral, TID  folic acid, 1 mg, oral, Daily  gabapentin, 100 mg, oral, q8h REYNALDO  ketoconazole, 200 mg, oral, " BID  ketorolac, 30 mg, intravenous, q6h  oxygen, , inhalation, Continuous - Inhalation  pantoprazole, 20 mg, oral, Daily before breakfast  predniSONE, 5 mg, oral, Daily  pseudoephedrine, 60 mg, oral, q8h      Continuous medications     PRN medications  PRN medications: diphenhydrAMINE, docusate sodium **OR** polyethylene glycol, HYDROmorphone, ondansetron ODT **OR** [DISCONTINUED] ondansetron, oxygen               Assessment/Plan   Assessment & Plan  Sickle cell crisis (Multi)    Joellen Narayan is a 23 y.o. 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 ED 9/12 with priapism (with associated penile pain) since 9/12/24 at 5pm and with sickle cell pain crisis. CXR (9/12) negative for acute cardiopulmonary process. Urology following patient for priapism, upon presentation, patient did not agree to bedside drainage and corporal blood gas. 9/13 started on Ketoconazole 200 mg BID and prednisone 5 mg daily per urology recs. Penile doppler US ordered 9/15 per urology. Hematology consulted on 9/13 give persistent priapism x1 month, recommend emergent RBCex. 9/13 plan for apheresis line placement in the ED followed by emergent RBCex, however, patient declined placement of line in ED on 9/13, plan for apheresis line placement with IR followed by RBCex on 9/16.  Hemoglobin and lysis labs at patients baseline. For pain, patient started on IVP dilaudid 2mg q2 hours. Discharge pending RBCex and improvement of priapism.     # Priapism  - Presented to the ED with worsening priapism since 5pm (hadn't resolved from last admission)  - s/p recent admission 8/31-9/9, OR with urology for  priapism drainage, penile block, and corpora cavernosa irrigation  - Also, s/p scheduled Sudafed q8h and added ibuprofen, baclofen 5mg TID and gabapentin 100mg TID.   - Penile doppler US while prior inpatient (9/1) showing small caliber of bilateral cavernosal arteries with lack of color flow in portions of both arteries, more on the right, c/w low-flow/ischemic priapism. Urology notified, s/p phenylephrine injection with urology 9/1 evening with partial resolution of priapism  - Reassessed by Urology 9/7/24 reevaluate penile appearance; firm but still bendable with no increased pain. Rec to continue management as prior and to maintain outpatient appointment on 9/10/24.  - Outpatient appointment 9/10 prescribed Flutamide 125mg TID but per parent, no pharmacy has this medication so it was not started  - Doppler U/S completed outpatient 9/10 noted arterial flow  - Urology following rec bedside drainage and corporal blood gas and phenylepinephine injection --patient refusing; also rec starting treatment with ketoconazole with prednisone, 9/15 rec penile doppler US    - Heme following and rec emergent RBCex in setting of prolonged priapism  - S/p Sudafed 60mg x 1 in ED  - Continue home Sudafed q8h prn and added ibuprofen, Baclofen 5mg TID and Gabapentin 100mg TID.  - 9/13 started on ketoconazole 200mg BID and Prednisone 5mg daily-per urology recs   - 9/15 penile doppler US ordered per urology to evaluate blood flow, pending   - C. Trachomatis/N. Gonorrhoeae sent- negative (9/14)   - Initially planned for aphesis line placement followed by RBCex on 9/13, however, patient declined placement of apheresis line in the ED, primary, hematology and urology team explained risks to patient and he continued to decline   - Plan for apheresis line placement in IR on 9/16 followed by RBCex - hematology, Dr. Cruz, and trransfusion med team aware      # Hgb SS with severe pain  - OARRS reviewed, no aberrancies  - No current care  path  - Hgb 8 on 9/15, Hgb baseline ~8.5; no indication for simple transfusion   - Tbili 4.6 on 9/15; Tbili baseline fluctuates ~5-13  -  on 9/15; LDH baseline ~500   - Continue IV Dilaudid 2mg q2hrs PRN severe pain (9/12-current)  - Continue folic acid 1mg daily  - Hgb S (9/12): 59.8%   - Heme following and rec emergent RBCex in setting of prolonged priapism (see above). Plan for RBCex on 9/16   - PO Zofran PRN for opioid-induced nausea, PO Benadryl PRN for opioid-induced pruritus, Bowel regimen for opioid-induced constipation with DocuSenna 2tabs BID and Miralax daily      # Nodular lymphocyte predominant Hodgkins lymphoma (NLPHL)   - Primary Oncologist: Dr. Stoll  - Enlarged lymph nodes noted 4/1/22  - RUQ US (11/14/22) with mildly enlarged LNs in the region of the kavin hepatis  - MRI liver (11/16/22) showed re-demonstration of bulky retroperitoneal lymphadenopathy and kavin hepatic lymphadenopathy    - (11/18/22) lymph node biopsy showed atypical lymphoid infiltrate. Reviewed by Hemepath board, insufficient for lymphoma diagnosis  - PET/CT (12/6/22) showing retroperitoneal lymphadenopathy  - Followed up with Dr. Stoll (12/16/22) with plan for surg/onc consult for large tissue bx but patient missed apt and was lost to follow up  - Requested new apt with Dr. Ronnie Marte 6/19/23, patient was not seen and lost to follow up  - CT a/p (11/28/23) increased size of retroperitoneal lymph nodes reflecting extramedullary hematopoiesis I/s/o sickle cell vs lymphoma  - Paraaortic LN bx via IR (11/30/23) consistent with NLPHL. Flow: no clonal B cell or T cell population identified, lymphocyte 95%, CD3+CD4+ 68%, CD3+CD8+ 7%, CD19+ 24%  - Elevated LDH/bili partially from sickle cell disease   - Chemotherapy (R-CHOP) was discussed with primary oncologist Dr. Stoll, and decision was made to simplify his chemotherapy to Rituxan and prednisone q3 weeks mainly due to frequent sickle cell crisis  - Current chemo regimen:  "Rituxan and prednisone q3 weeks (C1 1/18/24, C2 2/8/24, Missed C3 d/t sickle cell pain crisis, C4 4/4/24, C5 5/16/24, C6 6/7/24)  - Per pt no longer taking Acyclovir 400mg BID prophy   - PET CT (7/25) overall showing great response to treatment with persistent residual viable disease involving a left common iliac node  - Last FUV with Dr. Stoll 7/25/24 rec RTC in 2 months (next FUV scheduled for 9/19)     # Choledocholithiasis  - s/p ERCP 7/18/24 with a biliary sphincterotomy where sludge and stones were removed, achieving complete clearance  - Seen by gen surg 8/8/24 Dr. Dove who rec lap cholecystectomy d/t the chance of recurrence of choledocholithiasis  - Surgery has yet to be scheduled  - Tbili 4.6 on 9/15, which is markedly improved, recent peak at 52.8 prior to ERCP during recent hospital admission     # Hx of nocturnal oxygen dependence and hypoxia on room air  - Has not had home oxygen for 2-3 years   - Wean as able, encourage incentive spirometry  - Pulm FUV 8/8- \"Given his history of sickle cell disease, it is important to ensure he has formal sleep testing to look at desaturations and presence of sleep apnea despite not having a convincing history/body habitus typical for suspecting sleep apnea- patients with sickle cell have a higher prevalence of sleep disorders including nocturnal hypoxemia\"--> rec sleep referral for formal testing if deemed appropriate, ABG and O2 destat testing, and TTE with bubble study  - TTE 8/23 showing EF 60-65%, severely dilated LV and LA, + intrapulmonary shunting       DVT prophy: Lovenox subcutaneous, SCDs, encourage ambulation     DISPO:  - Full code, confirmed on admit  - DC pending RBCex and improvement of priapism   - SUSIE Narayan (Parent) [phone]       I spent 60 minutes in the professional and overall care of this patient.    Assessment and plan as above discussed with attending physician, Dr. Bren Stoll, PA-C      "

## 2024-09-15 NOTE — CARE PLAN
Throughout shift today, pt reported adequate pain control, no new complaints. Orders received to exchange 5 units RBC's tomorrow, 9/16. Penile ultrasound order d/t priapism expected for completion tomorrow, 9/16.     Problem: Pain  Goal: Takes deep breaths with improved pain control throughout the shift  Outcome: Progressing  Goal: Turns in bed with improved pain control throughout the shift  Outcome: Progressing  Goal: Walks with improved pain control throughout the shift  Outcome: Progressing  Goal: Performs ADL's with improved pain control throughout shift  Outcome: Progressing  Goal: Participates in PT with improved pain control throughout the shift  Outcome: Progressing  Goal: Free from opioid side effects throughout the shift  Outcome: Progressing  Goal: Free from acute confusion related to pain meds throughout the shift  Outcome: Progressing     Problem: Pain - Adult  Goal: Verbalizes/displays adequate comfort level or baseline comfort level  Outcome: Progressing     Problem: Safety - Adult  Goal: Free from fall injury  Outcome: Progressing     Problem: Discharge Planning  Goal: Discharge to home or other facility with appropriate resources  Outcome: Progressing     Problem: Chronic Conditions and Co-morbidities  Goal: Patient's chronic conditions and co-morbidity symptoms are monitored and maintained or improved  Outcome: Progressing   The patient's goals for the shift include      The clinical goals for the shift include Pt will report adequate pain control throughout shift 9/15/24 1900

## 2024-09-15 NOTE — PROGRESS NOTES
Urology [place]  Progress Note    Patient: Joellen Narayan  Age/Sex: 23 y.o., male  MRN: [id number]  Date of surgery:   Admit Date: 9/12/2024   Code Status: Full Code  Length of Stay: 2    Interval History/Overnight Events:   NAEON  Feeling better this AM  Penis still bendable   Objective  No intake/output data recorded.    Medications:  Current Facility-Administered Medications   Medication Dose Route Frequency Provider Last Rate Last Admin    baclofen (Lioresal) tablet 5 mg  5 mg oral TID Alicia L Gersin, APRN-CNP   5 mg at 09/15/24 0844    diphenhydrAMINE (BENADryl) capsule 25 mg  25 mg oral q6h PRN Alicia L Gersin, APRN-CNP        docusate sodium (Colace) capsule 100 mg  100 mg oral BID PRN Alicia L Gersin, APRN-CNP        Or    polyethylene glycol (Glycolax, Miralax) packet 17 g  17 g oral BID PRN Alicia L Gersin, APRN-CNP        flutamide (Eulexin) capsule 125 mg  125 mg oral TID Alicia L Gersin, APRN-CNP        folic acid (Folvite) tablet 1 mg  1 mg oral Daily Alicia L Gersin, APRN-CNP   1 mg at 09/15/24 0843    gabapentin (Neurontin) capsule 100 mg  100 mg oral q8h [facility] Alicia L Gersin, APRN-CNP   100 mg at 09/15/24 0610    HYDROmorphone PF (Dilaudid) injection 2 mg  2 mg intravenous q2h PRN Emilee Stoll PA-C   2 mg at 09/15/24 0842    ketoconazole (NIZOral) tablet 200 mg  200 mg oral BID Marie Modi, APRN-CNP   200 mg at 09/15/24 0845    ketorolac (Toradol) injection 30 mg  30 mg intravenous q6h Alicia L Gersin, APRN-CNP   30 mg at 09/15/24 0611    ondansetron ODT (Zofran-ODT) disintegrating tablet 8 mg  8 mg oral q8h PRN Alicia L Gersin, APRN-CNP        oxygen (O2) therapy   inhalation Continuous - Inhalation Slime Paul PA-C   21 percent at 09/15/24 0851    oxygen (O2) therapy   inhalation Continuous PRN - O2/gases YOHANNES PaganCNP        pantoprazole (ProtoNix) EC tablet 20 mg  20 mg oral Daily before breakfast RAVI Pagan   20 mg at 09/15/24 0610     predniSONE (Deltasone) tablet 5 mg  5 mg oral Daily RAVI Pathak   5 mg at 09/15/24 0843    pseudoephedrine (Sudafed) tablet 60 mg  60 mg oral q8h Emilee Stoll PA-C   60 mg at 09/15/24 0844     Vitals:    09/14/24 1241 09/14/24 1625 09/14/24 2344 09/15/24 0755   BP: 137/66 116/65 114/61 113/60   BP Location: Right arm Right arm  Right arm   Patient Position: Lying Lying  Lying   Pulse: 76 75 70 74   Resp: 16 16 16 16   Temp: 37.2 °C (99 °F) 36.9 °C (98.4 °F)  36.4 °C (97.5 °F)   TempSrc: Temporal Temporal  Temporal   SpO2: 93% 93% 95% 94%   Weight:       Height:            Results for orders placed or performed during the hospital encounter of 09/12/24 (from the past 24 hour(s))   CBC and Auto Differential   Result Value Ref Range    WBC 11.3 4.4 - 11.3 x10*3/uL    nRBC 5.2 (H) 0.0 - 0.0 /100 WBCs    RBC 2.68 (L) 4.50 - 5.90 x10*6/uL    Hemoglobin 8.0 (L) 13.5 - 17.5 g/dL    Hematocrit 23.7 (L) 41.0 - 52.0 %    MCV 88 80 - 100 fL    MCH 29.9 26.0 - 34.0 pg    MCHC 33.8 32.0 - 36.0 g/dL    RDW 24.0 (H) 11.5 - 14.5 %    Platelets 494 (H) 150 - 450 x10*3/uL    Immature Granulocytes %, Automated 1.5 (H) 0.0 - 0.9 %    Immature Granulocytes Absolute, Automated 0.17 0.00 - 0.70 x10*3/uL   Comprehensive Metabolic Panel   Result Value Ref Range    Glucose 89 74 - 99 mg/dL    Sodium 140 136 - 145 mmol/L    Potassium 4.0 3.5 - 5.3 mmol/L    Chloride 106 98 - 107 mmol/L    Bicarbonate 26 21 - 32 mmol/L    Anion Gap 12 10 - 20 mmol/L    Urea Nitrogen 7 6 - 23 mg/dL    Creatinine 0.57 0.50 - 1.30 mg/dL    eGFR >90 >60 mL/min/1.73m*2    Calcium 9.6 8.6 - 10.6 mg/dL    Albumin 4.3 3.4 - 5.0 g/dL    Alkaline Phosphatase 195 (H) 33 - 120 U/L    Total Protein 6.7 6.4 - 8.2 g/dL    AST 48 (H) 9 - 39 U/L    Bilirubin, Total 5.1 (H) 0.0 - 1.2 mg/dL    ALT 44 10 - 52 U/L   Lactate Dehydrogenase   Result Value Ref Range     (H) 84 - 246 U/L   Reticulocytes   Result Value Ref Range    Retic % 21.1 (H) 0.5 - 2.0  %    Retic Absolute 0.566 (H) 0.022 - 0.118 x10*6/uL    Reticulocyte Hemoglobin 33 28 - 38 pg    Immature Retic fraction 32.1 (H) <=16.0 %   Manual Differential   Result Value Ref Range    Neutrophils %, Manual 69.0 40.0 - 80.0 %    Bands %, Manual 12.1 0.0 - 5.0 %    Lymphocytes %, Manual 11.2 13.0 - 44.0 %    Monocytes %, Manual 2.6 2.0 - 10.0 %    Eosinophils %, Manual 1.7 0.0 - 6.0 %    Basophils %, Manual 0.0 0.0 - 2.0 %    Atypical Lymphocytes %, Manual 2.6 0.0 - 2.0 %    Myelocytes %, Manual 0.8 0.0 - 0.0 %    Seg Neutrophils Absolute, Manual 7.80 (H) 1.20 - 7.00 x10*3/uL    Bands Absolute, Manual 1.37 (H) 0.00 - 0.70 x10*3/uL    Lymphocytes Absolute, Manual 1.27 1.20 - 4.80 x10*3/uL    Monocytes Absolute, Manual 0.29 0.10 - 1.00 x10*3/uL    Eosinophils Absolute, Manual 0.19 0.00 - 0.70 x10*3/uL    Basophils Absolute, Manual 0.00 0.00 - 0.10 x10*3/uL    Atypical Lymphs Absolute, Manual 0.29 0.00 - 0.50 x10*3/uL    Myelocytes Absolute, Manual 0.09 0.00 - 0.00 x10*3/uL    Total Cells Counted 116     Neutrophils Absolute, Manual 9.17 (H) 1.20 - 7.70 x10*3/uL    RBC Morphology See Below     Polychromasia Mild     Sickle Cells Few     Target Cells Few     [place] Cells Few     Porter-[place] Bodies Present     Pappenheimer Bodies Present         Physical Exam                                                                                                                         Gen -  No acute distress, uncomfortable      Neuro - Alert, oriented, conversant     CV -  RR     Pulm - Symmetric chest rise, non-labored breathing on RA.      Abd - Soft, non-tender, non-distended     Ext - Warm, well perfused     Skin - without jaunice       Psych - appropriate tone, affect      -   partially erect phallus that is bendable. Fibrotic. Mildly tender to movement      Imaging  No results found.     Assessment & Plan   23 y.o.  male w/PMHx most notable for sickle cell disease with recurrent priapism, and nodular  lymphocyte predominant Hodgkin's lymphoma, who initially presented with chest pain and penile pain due to sickle cell pain crisis. Urology consulted for evaluation and management recommendations for priapism.     9/14: Pain improved. Exam relatively stable, penis slightly bendable although slightly more erect  Had long conversation with patient and mother regarding recurrent ischemic priapism as well as options including PO medical management, phenylephrine injection, ABG, corporal aspiration, and surgical management including shunt and IPP. Discussed risk and benefits of each as well as the progressive damage with observation of suspected ischemic priapism. I strongly recommended treatment with at least phenylephrine injection however patient elected to wait for ketoconazole.     Plan:  - obtain penile duplex ultrasound  - continue ketoconazole with prednisone. Patient should be discharged with this medication.   -Rest of care per primary, general sickle cell management. Consider hydroxyurea  -Urology will continue to follow     Assessment and plan discussed with chief resident Dr. Can and attending Dr Kuldip Lobato MD   Urology PGY-4  Adult Urology Pager: [phone]  Pediatric Urology Pager: [phone]

## 2024-09-15 NOTE — CARE PLAN
The patient's goals for the shift include      The clinical goals for the shift include Patient will have adequate pain control throughout this shift      Problem: Pain  Goal: Takes deep breaths with improved pain control throughout the shift  Outcome: Progressing  Goal: Turns in bed with improved pain control throughout the shift  Outcome: Progressing  Goal: Walks with improved pain control throughout the shift  Outcome: Progressing  Goal: Performs ADL's with improved pain control throughout shift  Outcome: Progressing  Goal: Participates in PT with improved pain control throughout the shift  Outcome: Progressing  Goal: Free from opioid side effects throughout the shift  Outcome: Progressing  Goal: Free from acute confusion related to pain meds throughout the shift  Outcome: Progressing

## 2024-09-16 ENCOUNTER — APPOINTMENT (OUTPATIENT)
Dept: OTHER | Facility: HOSPITAL | Age: 24
DRG: 812 | End: 2024-09-16
Payer: COMMERCIAL

## 2024-09-16 LAB
ALBUMIN SERPL BCP-MCNC: 4 G/DL (ref 3.4–5)
ALP SERPL-CCNC: 158 U/L (ref 33–120)
ALT SERPL W P-5'-P-CCNC: 35 U/L (ref 10–52)
ANION GAP SERPL CALC-SCNC: 12 MMOL/L (ref 10–20)
AST SERPL W P-5'-P-CCNC: 41 U/L (ref 9–39)
BASOPHILS # BLD AUTO: 0.04 X10*3/UL (ref 0–0.1)
BASOPHILS NFR BLD AUTO: 0.4 %
BILIRUB SERPL-MCNC: 4.4 MG/DL (ref 0–1.2)
BUN SERPL-MCNC: 8 MG/DL (ref 6–23)
CALCIUM SERPL-MCNC: 9.1 MG/DL (ref 8.6–10.6)
CARBOXYTHC UR-MCNC: >500 NG/ML
CHLORIDE SERPL-SCNC: 109 MMOL/L (ref 98–107)
CO2 SERPL-SCNC: 25 MMOL/L (ref 21–32)
CREAT SERPL-MCNC: 0.67 MG/DL (ref 0.5–1.3)
EGFRCR SERPLBLD CKD-EPI 2021: >90 ML/MIN/1.73M*2
EOSINOPHIL # BLD AUTO: 0.35 X10*3/UL (ref 0–0.7)
EOSINOPHIL NFR BLD AUTO: 3.4 %
ERYTHROCYTE [DISTWIDTH] IN BLOOD BY AUTOMATED COUNT: 21.8 % (ref 11.5–14.5)
GLUCOSE SERPL-MCNC: 75 MG/DL (ref 74–99)
HCT VFR BLD AUTO: 21.4 % (ref 41–52)
HEMOGLOBIN A2: 3.3 % (ref 2–3.5)
HEMOGLOBIN A: 34.9 % (ref 95.8–98)
HEMOGLOBIN F: 2 % (ref 0–2)
HEMOGLOBIN IDENTIFICATION INTERPRETATION: ABNORMAL
HEMOGLOBIN S: 59.8 %
HGB BLD-MCNC: 7.2 G/DL (ref 13.5–17.5)
HGB RETIC QN: 30 PG (ref 28–38)
HOWELL-JOLLY BOD BLD QL SMEAR: PRESENT
IMM GRANULOCYTES # BLD AUTO: 0.09 X10*3/UL (ref 0–0.7)
IMM GRANULOCYTES NFR BLD AUTO: 0.9 % (ref 0–0.9)
IMMATURE RETIC FRACTION: 16.2 %
LDH SERPL L TO P-CCNC: 359 U/L (ref 84–246)
LYMPHOCYTES # BLD AUTO: 2.59 X10*3/UL (ref 1.2–4.8)
LYMPHOCYTES NFR BLD AUTO: 25.1 %
MAGNESIUM SERPL-MCNC: 2.19 MG/DL (ref 1.6–2.4)
MCH RBC QN AUTO: 29 PG (ref 26–34)
MCHC RBC AUTO-ENTMCNC: 33.6 G/DL (ref 32–36)
MCV RBC AUTO: 86 FL (ref 80–100)
MONOCYTES # BLD AUTO: 1.19 X10*3/UL (ref 0.1–1)
MONOCYTES NFR BLD AUTO: 11.5 %
NEUTROPHILS # BLD AUTO: 6.06 X10*3/UL (ref 1.2–7.7)
NEUTROPHILS NFR BLD AUTO: 58.7 %
NRBC BLD-RTO: 3.4 /100 WBCS (ref 0–0)
PAPPENHEIMER BOD BLD QL SMEAR: PRESENT
PATH REVIEW-HGB IDENTIFICATION: ABNORMAL
PLATELET # BLD AUTO: 468 X10*3/UL (ref 150–450)
POLYCHROMASIA BLD QL SMEAR: NORMAL
POTASSIUM SERPL-SCNC: 4.4 MMOL/L (ref 3.5–5.3)
PROT SERPL-MCNC: 6.4 G/DL (ref 6.4–8.2)
RBC # BLD AUTO: 2.48 X10*6/UL (ref 4.5–5.9)
RBC MORPH BLD: NORMAL
RETICS #: 0.44 X10*6/UL (ref 0.02–0.12)
RETICS/RBC NFR AUTO: 17.9 % (ref 0.5–2)
SICKLE CELLS BLD QL SMEAR: NORMAL
SODIUM SERPL-SCNC: 142 MMOL/L (ref 136–145)
WBC # BLD AUTO: 10.3 X10*3/UL (ref 4.4–11.3)

## 2024-09-16 PROCEDURE — 2500000001 HC RX 250 WO HCPCS SELF ADMINISTERED DRUGS (ALT 637 FOR MEDICARE OP)

## 2024-09-16 PROCEDURE — 2500000004 HC RX 250 GENERAL PHARMACY W/ HCPCS (ALT 636 FOR OP/ED): Performed by: NURSE PRACTITIONER

## 2024-09-16 PROCEDURE — 2500000002 HC RX 250 W HCPCS SELF ADMINISTERED DRUGS (ALT 637 FOR MEDICARE OP, ALT 636 FOR OP/ED)

## 2024-09-16 PROCEDURE — 2500000001 HC RX 250 WO HCPCS SELF ADMINISTERED DRUGS (ALT 637 FOR MEDICARE OP): Performed by: NURSE PRACTITIONER

## 2024-09-16 PROCEDURE — 2500000004 HC RX 250 GENERAL PHARMACY W/ HCPCS (ALT 636 FOR OP/ED)

## 2024-09-16 PROCEDURE — 1170000001 HC PRIVATE ONCOLOGY ROOM DAILY

## 2024-09-16 PROCEDURE — 84075 ASSAY ALKALINE PHOSPHATASE: CPT

## 2024-09-16 PROCEDURE — 36415 COLL VENOUS BLD VENIPUNCTURE: CPT

## 2024-09-16 PROCEDURE — 99233 SBSQ HOSP IP/OBS HIGH 50: CPT | Performed by: NURSE PRACTITIONER

## 2024-09-16 PROCEDURE — 85025 COMPLETE CBC W/AUTO DIFF WBC: CPT

## 2024-09-16 PROCEDURE — 85045 AUTOMATED RETICULOCYTE COUNT: CPT

## 2024-09-16 PROCEDURE — 83615 LACTATE (LD) (LDH) ENZYME: CPT

## 2024-09-16 PROCEDURE — 2500000005 HC RX 250 GENERAL PHARMACY W/O HCPCS: Performed by: PHYSICIAN ASSISTANT

## 2024-09-16 RX ORDER — AMOXICILLIN 250 MG
2 CAPSULE ORAL 2 TIMES DAILY
Status: DISCONTINUED | OUTPATIENT
Start: 2024-09-16 | End: 2024-09-28 | Stop reason: HOSPADM

## 2024-09-16 RX ORDER — FLUTAMIDE 125 MG/1
125 CAPSULE ORAL 3 TIMES DAILY
Qty: 90 CAPSULE | Refills: 0 | Status: SHIPPED | OUTPATIENT
Start: 2024-09-16

## 2024-09-16 RX ORDER — HYDROMORPHONE HYDROCHLORIDE 1 MG/ML
1 INJECTION, SOLUTION INTRAMUSCULAR; INTRAVENOUS; SUBCUTANEOUS
Status: DISCONTINUED | OUTPATIENT
Start: 2024-09-16 | End: 2024-09-17

## 2024-09-16 RX ORDER — POLYETHYLENE GLYCOL 3350 17 G/17G
17 POWDER, FOR SOLUTION ORAL DAILY
Status: DISCONTINUED | OUTPATIENT
Start: 2024-09-16 | End: 2024-09-28 | Stop reason: HOSPADM

## 2024-09-16 RX ADMIN — PANTOPRAZOLE SODIUM 20 MG: 20 TABLET, DELAYED RELEASE ORAL at 05:01

## 2024-09-16 RX ADMIN — HYDROMORPHONE HYDROCHLORIDE 2 MG: 2 INJECTION, SOLUTION INTRAMUSCULAR; INTRAVENOUS; SUBCUTANEOUS at 09:22

## 2024-09-16 RX ADMIN — HYDROMORPHONE HYDROCHLORIDE 2 MG: 2 INJECTION, SOLUTION INTRAMUSCULAR; INTRAVENOUS; SUBCUTANEOUS at 07:07

## 2024-09-16 RX ADMIN — Medication 2 L/MIN: at 20:10

## 2024-09-16 RX ADMIN — PREDNISONE 5 MG: 10 TABLET ORAL at 08:41

## 2024-09-16 RX ADMIN — HYDROMORPHONE HYDROCHLORIDE 1 MG: 1 INJECTION, SOLUTION INTRAMUSCULAR; INTRAVENOUS; SUBCUTANEOUS at 23:26

## 2024-09-16 RX ADMIN — SENNOSIDES AND DOCUSATE SODIUM 2 TABLET: 8.6; 5 TABLET ORAL at 20:09

## 2024-09-16 RX ADMIN — KETOCONAZOLE 200 MG: 200 TABLET ORAL at 20:09

## 2024-09-16 RX ADMIN — SENNOSIDES AND DOCUSATE SODIUM 2 TABLET: 8.6; 5 TABLET ORAL at 08:41

## 2024-09-16 RX ADMIN — GABAPENTIN 100 MG: 100 CAPSULE ORAL at 22:14

## 2024-09-16 RX ADMIN — HYDROMORPHONE HYDROCHLORIDE 2 MG: 2 INJECTION, SOLUTION INTRAMUSCULAR; INTRAVENOUS; SUBCUTANEOUS at 11:25

## 2024-09-16 RX ADMIN — HYDROMORPHONE HYDROCHLORIDE 2 MG: 2 INJECTION, SOLUTION INTRAMUSCULAR; INTRAVENOUS; SUBCUTANEOUS at 05:01

## 2024-09-16 RX ADMIN — BACLOFEN 5 MG: 10 TABLET ORAL at 17:04

## 2024-09-16 RX ADMIN — PSEUDOEPHEDRINE HCL 60 MG: 30 TABLET, FILM COATED ORAL at 08:41

## 2024-09-16 RX ADMIN — HYDROMORPHONE HYDROCHLORIDE 1 MG: 1 INJECTION, SOLUTION INTRAMUSCULAR; INTRAVENOUS; SUBCUTANEOUS at 20:10

## 2024-09-16 RX ADMIN — KETOCONAZOLE 200 MG: 200 TABLET ORAL at 08:41

## 2024-09-16 RX ADMIN — PSEUDOEPHEDRINE HCL 60 MG: 30 TABLET, FILM COATED ORAL at 17:04

## 2024-09-16 RX ADMIN — Medication 2 L/MIN: at 08:42

## 2024-09-16 RX ADMIN — HYDROMORPHONE HYDROCHLORIDE 1 MG: 1 INJECTION, SOLUTION INTRAMUSCULAR; INTRAVENOUS; SUBCUTANEOUS at 13:27

## 2024-09-16 RX ADMIN — HYDROMORPHONE HYDROCHLORIDE 1 MG: 1 INJECTION, SOLUTION INTRAMUSCULAR; INTRAVENOUS; SUBCUTANEOUS at 17:04

## 2024-09-16 RX ADMIN — HYDROMORPHONE HYDROCHLORIDE 2 MG: 2 INJECTION, SOLUTION INTRAMUSCULAR; INTRAVENOUS; SUBCUTANEOUS at 01:09

## 2024-09-16 RX ADMIN — FOLIC ACID 1 MG: 1 TABLET ORAL at 08:41

## 2024-09-16 RX ADMIN — HYDROMORPHONE HYDROCHLORIDE 2 MG: 2 INJECTION, SOLUTION INTRAMUSCULAR; INTRAVENOUS; SUBCUTANEOUS at 02:54

## 2024-09-16 RX ADMIN — GABAPENTIN 100 MG: 100 CAPSULE ORAL at 05:01

## 2024-09-16 RX ADMIN — BACLOFEN 5 MG: 10 TABLET ORAL at 08:41

## 2024-09-16 RX ADMIN — BACLOFEN 5 MG: 10 TABLET ORAL at 20:09

## 2024-09-16 RX ADMIN — GABAPENTIN 100 MG: 100 CAPSULE ORAL at 13:27

## 2024-09-16 ASSESSMENT — COGNITIVE AND FUNCTIONAL STATUS - GENERAL
DAILY ACTIVITIY SCORE: 24
DAILY ACTIVITIY SCORE: 24
MOBILITY SCORE: 24
MOBILITY SCORE: 24

## 2024-09-16 ASSESSMENT — PAIN SCALES - GENERAL
PAINLEVEL_OUTOF10: 8
PAINLEVEL_OUTOF10: 9
PAINLEVEL_OUTOF10: 9
PAINLEVEL_OUTOF10: 8
PAINLEVEL_OUTOF10: 8
PAINLEVEL_OUTOF10: 9
PAINLEVEL_OUTOF10: 8
PAINLEVEL_OUTOF10: 8
PAINLEVEL_OUTOF10: 7
PAINLEVEL_OUTOF10: 8
PAINLEVEL_OUTOF10: 7
PAINLEVEL_OUTOF10: 9
PAINLEVEL_OUTOF10: 8
PAINLEVEL_OUTOF10: 7
PAINLEVEL_OUTOF10: 9
PAINLEVEL_OUTOF10: 8
PAINLEVEL_OUTOF10: 8

## 2024-09-16 ASSESSMENT — PAIN DESCRIPTION - LOCATION: LOCATION: CHEST

## 2024-09-16 ASSESSMENT — PAIN - FUNCTIONAL ASSESSMENT
PAIN_FUNCTIONAL_ASSESSMENT: 0-10

## 2024-09-16 NOTE — CARE PLAN
The patient's goals for the shift include      The clinical goals for the shift include pt will remain injury free    Problem: Pain  Goal: Takes deep breaths with improved pain control throughout the shift  Outcome: Progressing  Goal: Turns in bed with improved pain control throughout the shift  Outcome: Progressing  Goal: Walks with improved pain control throughout the shift  Outcome: Progressing  Goal: Performs ADL's with improved pain control throughout shift  Outcome: Progressing  Goal: Participates in PT with improved pain control throughout the shift  Outcome: Progressing  Goal: Free from opioid side effects throughout the shift  Outcome: Progressing  Goal: Free from acute confusion related to pain meds throughout the shift  Outcome: Progressing     Problem: Pain - Adult  Goal: Verbalizes/displays adequate comfort level or baseline comfort level  Outcome: Progressing     Problem: Safety - Adult  Goal: Free from fall injury  Outcome: Progressing     Problem: Discharge Planning  Goal: Discharge to home or other facility with appropriate resources  Outcome: Progressing     Problem: Chronic Conditions and Co-morbidities  Goal: Patient's chronic conditions and co-morbidity symptoms are monitored and maintained or improved  Outcome: Progressing

## 2024-09-16 NOTE — PROGRESS NOTES
"Joellen Narayan is a 23 y.o. male on day 3 of admission presenting with Sickle cell crisis (Multi).    Subjective   Seen this AM at the bedside. Pt reports that his sickle cell pain/penile is overall improving and would like to start weaning his pain meds this afternoon. He is declining RBCEx today because he feels like his sickle cell pain/penile pain is improving. Penis is soft and bendable today per urology. He denies any fevers/chills, SOB, or CP.    Objective     Physical Exam  Constitutional:       Appearance: Normal appearance.   HENT:      Mouth/Throat:      Mouth: Mucous membranes are moist.   Eyes:      General: Scleral icterus present.      Pupils: Pupils are equal, round, and reactive to light.   Cardiovascular:      Rate and Rhythm: Normal rate and regular rhythm.      Pulses: Normal pulses.      Heart sounds: Normal heart sounds.   Pulmonary:      Effort: Pulmonary effort is normal.      Breath sounds: Normal breath sounds.   Abdominal:      General: Abdomen is flat. Bowel sounds are normal.      Palpations: Abdomen is soft.   Musculoskeletal:         General: Normal range of motion.      Cervical back: Normal range of motion and neck supple.   Skin:     General: Skin is warm.   Neurological:      General: No focal deficit present.      Mental Status: He is alert.   Psychiatric:         Mood and Affect: Mood normal.       Last Recorded Vitals  Blood pressure 109/58, pulse 64, temperature 37.1 °C (98.8 °F), resp. rate 16, height 1.88 m (6' 2\"), weight 69.4 kg (153 lb), SpO2 93%.  Intake/Output last 3 Shifts:  I/O last 3 completed shifts:  In: 714 (10.3 mL/kg) [P.O.:714]  Out: - (0 mL/kg)   Weight: 69.4 kg     Results for orders placed or performed during the hospital encounter of 09/12/24 (from the past 24 hour(s))   CBC and Auto Differential   Result Value Ref Range    WBC 10.3 4.4 - 11.3 x10*3/uL    nRBC 3.4 (H) 0.0 - 0.0 /100 WBCs    RBC 2.48 (L) 4.50 - 5.90 x10*6/uL    Hemoglobin 7.2 (L) 13.5 - 17.5 " g/dL    Hematocrit 21.4 (L) 41.0 - 52.0 %    MCV 86 80 - 100 fL    MCH 29.0 26.0 - 34.0 pg    MCHC 33.6 32.0 - 36.0 g/dL    RDW 21.8 (H) 11.5 - 14.5 %    Platelets 468 (H) 150 - 450 x10*3/uL    Neutrophils % 58.7 40.0 - 80.0 %    Immature Granulocytes %, Automated 0.9 0.0 - 0.9 %    Lymphocytes % 25.1 13.0 - 44.0 %    Monocytes % 11.5 2.0 - 10.0 %    Eosinophils % 3.4 0.0 - 6.0 %    Basophils % 0.4 0.0 - 2.0 %    Neutrophils Absolute 6.06 1.20 - 7.70 x10*3/uL    Immature Granulocytes Absolute, Automated 0.09 0.00 - 0.70 x10*3/uL    Lymphocytes Absolute 2.59 1.20 - 4.80 x10*3/uL    Monocytes Absolute 1.19 (H) 0.10 - 1.00 x10*3/uL    Eosinophils Absolute 0.35 0.00 - 0.70 x10*3/uL    Basophils Absolute 0.04 0.00 - 0.10 x10*3/uL   Comprehensive Metabolic Panel   Result Value Ref Range    Glucose 75 74 - 99 mg/dL    Sodium 142 136 - 145 mmol/L    Potassium 4.4 3.5 - 5.3 mmol/L    Chloride 109 (H) 98 - 107 mmol/L    Bicarbonate 25 21 - 32 mmol/L    Anion Gap 12 10 - 20 mmol/L    Urea Nitrogen 8 6 - 23 mg/dL    Creatinine 0.67 0.50 - 1.30 mg/dL    eGFR >90 >60 mL/min/1.73m*2    Calcium 9.1 8.6 - 10.6 mg/dL    Albumin 4.0 3.4 - 5.0 g/dL    Alkaline Phosphatase 158 (H) 33 - 120 U/L    Total Protein 6.4 6.4 - 8.2 g/dL    AST 41 (H) 9 - 39 U/L    Bilirubin, Total 4.4 (H) 0.0 - 1.2 mg/dL    ALT 35 10 - 52 U/L   Lactate Dehydrogenase   Result Value Ref Range     (H) 84 - 246 U/L   Reticulocytes   Result Value Ref Range    Retic % 17.9 (H) 0.5 - 2.0 %    Retic Absolute 0.444 (H) 0.022 - 0.118 x10*6/uL    Reticulocyte Hemoglobin 30 28 - 38 pg    Immature Retic fraction 16.2 (H) <=16.0 %   Morphology   Result Value Ref Range    RBC Morphology See Below     Polychromasia Mild     Sickle Cells Few     Porter-Marlborough Bodies Present     Pappenheimer Bodies Present                  Scheduled medications  baclofen, 5 mg, oral, TID  flutamide, 125 mg, oral, TID  folic acid, 1 mg, oral, Daily  gabapentin, 100 mg, oral, q8h  REYNALDO  ketoconazole, 200 mg, oral, BID  oxygen, , inhalation, Continuous - Inhalation  pantoprazole, 20 mg, oral, Daily before breakfast  polyethylene glycol, 17 g, oral, Daily  predniSONE, 5 mg, oral, Daily  pseudoephedrine, 60 mg, oral, q8h  sennosides-docusate sodium, 2 tablet, oral, BID      Continuous medications     PRN medications  PRN medications: diphenhydrAMINE, HYDROmorphone, ondansetron ODT **OR** [DISCONTINUED] ondansetron, oxygen               Assessment/Plan   Assessment & Plan  Sickle cell crisis (Multi)    Joellen Narayan is a 23 y.o. 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 CXR (9/12) negative for acute cardiopulmonary process. Urology following patient for priapism, upon presentation, patient did not agree to bedside drainage and corporal blood gas. 9/13 started on Ketoconazole 200mg BID and prednisone 5mg daily per urology recs. Penile doppler US ordered 9/15 per urology, showing normal caliber and peak systolic velocities of b/l cavernosal arteries which have improved since prior. Hematology consulted on 9/13 give persistent priapism x1 month, recommend emergent RBCex. 9/13 plan for apheresis line placement in the ED followed by emergent RBCex, however, patient declined placement of line in ED on 9/13, plan for apheresis line placement with IR followed by RBCex on 9/16.  On 9/16 he then again declined apheresis line placement and RBCEx. Hemoglobin and lysis labs at patients baseline. For pain, patient started on IV dilaudid. Discharge pending  improvement in pain and stability of priapism.     # Priapism  - Presented to the ED with worsening priapism since 5pm (hadn't resolved from last admission)  - s/p recent admission 8/31-9/9, OR with urology for priapism drainage, penile block, and corpora cavernosa irrigation  - Also, s/p scheduled Sudafed q8h and added ibuprofen, baclofen 5mg TID and gabapentin 100mg TID.   - Penile doppler US while prior inpatient (9/1) showing small caliber of bilateral cavernosal arteries with lack of color flow in portions of both arteries, more on the right, c/w low-flow/ischemic priapism. Urology notified, s/p phenylephrine injection with urology 9/1 evening with partial resolution of priapism  - Reassessed by Urology 9/7/24 reevaluate penile appearance; firm but still bendable with no increased pain. Rec to continue management as prior and to maintain outpatient appointment on 9/10/24.  - Outpatient appointment 9/10 prescribed Flutamide 125mg TID but per parent, no pharmacy has this medication so it was not started  - Doppler U/S completed outpatient 9/10 noted arterial flow  - Urology following rec bedside drainage and corporal blood gas and phenylepinephine injection --patient refusing; also rec starting treatment with ketoconazole with prednisone, 9/15 rec penile doppler US    - Penile doppler US (9/15) per urology, showing normal caliber and peak systolic velocities of b/l cavernosal arteries which have improved since prior.   - Heme following and rec emergent RBCex in setting of prolonged priapism  - S/p Sudafed 60mg x 1 in ED  - Continue home Sudafed q8h prn and added ibuprofen, Baclofen 5mg TID and Gabapentin 100mg TID.  - 9/13 started on ketoconazole 200mg BID and Prednisone 5mg daily-per urology recs   - C. Trachomatis/N. Gonorrhoeae sent- negative (9/14)   - Initially planned for aphesis line placement followed by RBCex on 9/13, however, patient declined placement of apheresis line in the ED, primary, hematology  and urology team explained risks to patient and he continued to decline   - Planed for apheresis line placement in IR on 9/16 followed by RBCex - hematology, Dr. Cruz, and trransfusion med team aware--> however on 9/16 pt declined apheresis line placement and RBCEx again  - 9/16 pt was educated on risks of refusing/delaying medical care and recurrent priapism     # Hgb SS with severe pain  - OARRS reviewed, no aberrancies  - No current care path  - Hgb baseline ~8.5; currently stable, no indication for simple transfusion   - Tbili baseline fluctuates ~5-1; Tbili 4.4 on 9/16  - LDH baseline ~500   on 9/16  - s/p IV Dilaudid 2mg q2hrs PRN severe pain (9/12-9/16)  - 9/16 weaned pain meds to IV dilaudid 1mg q3hrs PRN severe pain  - Continue folic acid 1mg daily  - Hgb S (9/12): 59.8%   - Heme following (see above)  - PO Zofran PRN for opioid-induced nausea, PO Benadryl PRN for opioid-induced pruritus, Bowel regimen for opioid-induced constipation with DocuSenna 2tabs BID and Miralax daily      # Nodular lymphocyte predominant Hodgkins lymphoma (NLPHL)   - Primary Oncologist: Dr. Stoll  - Enlarged lymph nodes noted 4/1/22  - RUQ US (11/14/22) with mildly enlarged LNs in the region of the kavin hepatis  - MRI liver (11/16/22) showed re-demonstration of bulky retroperitoneal lymphadenopathy and kavin hepatic lymphadenopathy    - (11/18/22) lymph node biopsy showed atypical lymphoid infiltrate. Reviewed by Hemepath board, insufficient for lymphoma diagnosis  - PET/CT (12/6/22) showing retroperitoneal lymphadenopathy  - Followed up with Dr. Stoll (12/16/22) with plan for surg/onc consult for large tissue bx but patient missed apt and was lost to follow up  - Requested new apt with Dr. Ronnie Marte 6/19/23, patient was not seen and lost to follow up  - CT a/p (11/28/23) increased size of retroperitoneal lymph nodes reflecting extramedullary hematopoiesis I/s/o sickle cell vs lymphoma  - Paraaortic LN bx via IR  "(11/30/23) consistent with NLPHL. Flow: no clonal B cell or T cell population identified, lymphocyte 95%, CD3+CD4+ 68%, CD3+CD8+ 7%, CD19+ 24%  - Elevated LDH/bili partially from sickle cell disease   - Chemotherapy (R-CHOP) was discussed with primary oncologist Dr. Stoll, and decision was made to simplify his chemotherapy to Rituxan and prednisone q3 weeks mainly due to frequent sickle cell crisis  - Current chemo regimen: Rituxan and prednisone q3 weeks (C1 1/18/24, C2 2/8/24, Missed C3 d/t sickle cell pain crisis, C4 4/4/24, C5 5/16/24, C6 6/7/24)  - Per pt no longer taking Acyclovir 400mg BID prophy   - PET CT (7/25) overall showing great response to treatment with persistent residual viable disease involving a left common iliac node  - Last FUV with Dr. Stoll 7/25/24 rec RTC in 2 months (next FUV scheduled for 9/19)     # Choledocholithiasis  - s/p ERCP 7/18/24 with a biliary sphincterotomy where sludge and stones were removed, achieving complete clearance  - Seen by gen surg 8/8/24 Dr. Dove who rec lap cholecystectomy d/t the chance of recurrence of choledocholithiasis  - Surgery has yet to be scheduled, has FUV with gen surg again 9/30  - Tbili 4.4 on 9/16, which is markedly improved, recent peak at 52.8 prior to ERCP during recent hospital admission     # Hx of nocturnal oxygen dependence and hypoxia on room air  - Has not had home oxygen for 2-3 years   - Wean as able, encourage incentive spirometry  - Pulm FUV 8/8- \"Given his history of sickle cell disease, it is important to ensure he has formal sleep testing to look at desaturations and presence of sleep apnea despite not having a convincing history/body habitus typical for suspecting sleep apnea- patients with sickle cell have a higher prevalence of sleep disorders including nocturnal hypoxemia\"--> rec sleep referral for formal testing if deemed appropriate, ABG and O2 destat testing, and TTE with bubble study  - TTE 8/23 showing EF 60-65%, " severely dilated LV and LA, + intrapulmonary shunting       DVT prophy: Lovenox subcutaneous, SCDs, encourage ambulation     DISPO:  - Full code, confirmed on admit  - Discharge pending improvement in pain and stability of priapism  - SUSIE Narayan (Parent) [phone]  - Sickle cell FUV 9/18, Dr. Stoll FUV 9/19, Gen Surg FUV 9/30, Urology FUV 10/7    I spent >60 minutes in the professional and overall care of this patient.    Assessment and plan as above discussed with attending physician, Dr. Deanna Jenkins, APRN-CNP

## 2024-09-16 NOTE — PROGRESS NOTES
Medical Certificate for Medical Necessity    Date Received: 9/13/2024   Utility Company: [employer] (P:[phone]/F: [phone])  Completed by: Ian Cruz MD  Submitted via Fax: on 09/13/24  Resources Offered: None at this time  Additional Information: n/a    SW will continue to follow as needed.

## 2024-09-16 NOTE — PROGRESS NOTES
09/16/24 1000   Discharge Planning   Who is requesting discharge planning? Provider   Home or Post Acute Services None   Expected Discharge Disposition Home   Does the patient need discharge transport arranged? No     9/16/24 @ 1050  Patient admitted for chest and penile pain due to sickle cell crisis. Urology following patient. Patient to get exchanged today per team. Anticipate no needs at discharge. Will continue to follow patient during his hospital stay for any needs that may arise.  Mari Ko RN TCC

## 2024-09-16 NOTE — PROGRESS NOTES
Child Life Assessment:   Reason for Consult  Discipline: Child Life Specialist  Reason for Consult: Coping skill development/planning, Relaxation strategies  Referral Source: Self, Ongoing  Total Time Spent (min): 60 minutes    Patient Intervention(s)  Type of Intervention Performed: Healing environment interventions  Healing Environment Intervention(s): Coping skill development/planning, Coping plan implementation, Therapeutic play/activities, Opportunity for choice and control, Empathetic listening/validation of emotions, Facilitate calming/relaxation    Evaluation  Evaluation/Plan of Care: Provide ongoing support, Patient/family receptive    Session Details: Certified Child Life Specialist (CCLS) familiar with patient from previous admissions and continues to offer support for coping with pain and frequent hospitalizations.  Patient participated in a guided imagery script with CCLS and expressed feeling more relaxed afterward.  Patient shared that the way he suresh best with pain from SCC is utilizing techniques to deter his mind from his pain, including these relaxation strategies and conversation as an alternate focus.  CCLS returned later in the day to guide patient through an ink touch stone activity focused on what is both in and out of patient's control in regards to his pain crises.  Patient actively engaged in the activity and expressed appreciation for conversation with CCLS regarding every day life and patient's hospitalization.  Child life will continue to provide support to patient as needed throughout admission.    FINESSE Stephens, CCLS  Secure Chat

## 2024-09-16 NOTE — CARE PLAN
The patient's goals for the shift include      The clinical goals for the shift include pt will remain injury free

## 2024-09-16 NOTE — PROGRESS NOTES
[facility]  Progress Note    Patient: Joellen Narayan  Age/Sex: 23 y.o., male  MRN: [id number]  Date of surgery:   Admit Date: 9/12/2024   Code Status: Full Code  Length of Stay: 3    Interval History/Overnight Events:   NAEON  Feeling better this AM, mention pain down from yesterday   Penis non erect, soft, bendable     Objective  09/14 1900 - 09/16 0659  In: 714 [P.O.:714]  Out: -     Medications:  Current Facility-Administered Medications   Medication Dose Route Frequency Provider Last Rate Last Admin    baclofen (Lioresal) tablet 5 mg  5 mg oral TID Alicia L Gersin, APRN-CNP   5 mg at 09/15/24 2021    diphenhydrAMINE (BENADryl) capsule 25 mg  25 mg oral q6h PRN Alicia L Gersin, APRN-CNP        flutamide (Eulexin) capsule 125 mg  125 mg oral TID Alicia OLIVARES Gersin, APRN-CNP        folic acid (Folvite) tablet 1 mg  1 mg oral Daily Alicia L Gersin, APRN-CNP   1 mg at 09/15/24 0843    gabapentin (Neurontin) capsule 100 mg  100 mg oral q8h REYNALDO Alicia L Gersin, APRN-CNP   100 mg at 09/16/24 0501    HYDROmorphone PF (Dilaudid) injection 2 mg  2 mg intravenous q2h PRN Emilee Stoll PA-C   2 mg at 09/16/24 0707    ketoconazole (NIZOral) tablet 200 mg  200 mg oral BID RAAD Pathak-CNP   200 mg at 09/15/24 2021    ondansetron ODT (Zofran-ODT) disintegrating tablet 8 mg  8 mg oral q8h PRN Alicia MARSHALL Beckmansin, APRN-CNP        oxygen (O2) therapy   inhalation Continuous - Inhalation Slime Paul PA-C   2 L/min at 09/15/24 2027    oxygen (O2) therapy   inhalation Continuous PRN - O2/gases Alicia MARSHALL Gersin, APRN-CNP        pantoprazole (ProtoNix) EC tablet 20 mg  20 mg oral Daily before breakfast Alicia OLIVARES Gersin, APRN-CNP   20 mg at 09/16/24 0501    polyethylene glycol (Glycolax, Miralax) packet 17 g  17 g oral Daily RAVI Khoury        predniSONE (Deltasone) tablet 5 mg  5 mg oral Daily RAVI Pathak   5 mg at 09/15/24 0843    pseudoephedrine (Sudafed) tablet 60 mg  60  mg oral q8h Emilee Stoll PA-C   60 mg at 09/15/24 6039    sennosides-docusate sodium (Promise-Colace) 8.6-50 mg per tablet 2 tablet  2 tablet oral BID RAAD Khoury-CNP         Vitals:    09/15/24 0755 09/15/24 1210 09/15/24 1536 09/15/24 2303   BP: 113/60 106/55 121/52 111/60   BP Location: Right arm Right arm Left arm Left arm   Patient Position: Lying Lying Lying Lying   Pulse: 74 66 65 72   Resp: 16 16 16 16   Temp: 36.4 °C (97.5 °F) 36.7 °C (98.1 °F) 37.3 °C (99.1 °F) 36.6 °C (97.9 °F)   TempSrc: Temporal Temporal Temporal Temporal   SpO2: 94% 93% 98% 94%   Weight:       Height:            Results for orders placed or performed during the hospital encounter of 09/12/24 (from the past 24 hour(s))   CBC and Auto Differential   Result Value Ref Range    WBC 11.1 4.4 - 11.3 x10*3/uL    nRBC 4.2 (H) 0.0 - 0.0 /100 WBCs    RBC 2.70 (L) 4.50 - 5.90 x10*6/uL    Hemoglobin 8.0 (L) 13.5 - 17.5 g/dL    Hematocrit 23.9 (L) 41.0 - 52.0 %    MCV 89 80 - 100 fL    MCH 29.6 26.0 - 34.0 pg    MCHC 33.5 32.0 - 36.0 g/dL    RDW 22.9 (H) 11.5 - 14.5 %    Platelets 503 (H) 150 - 450 x10*3/uL    Immature Granulocytes %, Automated 1.1 (H) 0.0 - 0.9 %    Immature Granulocytes Absolute, Automated 0.12 0.00 - 0.70 x10*3/uL   Comprehensive Metabolic Panel   Result Value Ref Range    Glucose 71 (L) 74 - 99 mg/dL    Sodium 140 136 - 145 mmol/L    Potassium 4.0 3.5 - 5.3 mmol/L    Chloride 105 98 - 107 mmol/L    Bicarbonate 29 21 - 32 mmol/L    Anion Gap 10 10 - 20 mmol/L    Urea Nitrogen 7 6 - 23 mg/dL    Creatinine 0.72 0.50 - 1.30 mg/dL    eGFR >90 >60 mL/min/1.73m*2    Calcium 9.4 8.6 - 10.6 mg/dL    Albumin 4.2 3.4 - 5.0 g/dL    Alkaline Phosphatase 177 (H) 33 - 120 U/L    Total Protein 6.8 6.4 - 8.2 g/dL    AST 44 (H) 9 - 39 U/L    Bilirubin, Total 4.6 (H) 0.0 - 1.2 mg/dL    ALT 43 10 - 52 U/L   Lactate Dehydrogenase   Result Value Ref Range     (H) 84 - 246 U/L   Reticulocytes   Result Value Ref Range    Retic % 21.5  (H) 0.5 - 2.0 %    Retic Absolute 0.581 (H) 0.022 - 0.118 x10*6/uL    Reticulocyte Hemoglobin 33 28 - 38 pg    Immature Retic fraction 20.4 (H) <=16.0 %   Type And Screen   Result Value Ref Range    ABO TYPE B     Rh TYPE POS     ANTIBODY SCREEN NEG    Calcium, ionized   Result Value Ref Range    POCT Calcium, Ionized 1.27 1.1 - 1.33 mmol/L   Protime-INR   Result Value Ref Range    Protime 14.2 (H) 9.8 - 12.8 seconds    INR 1.3 (H) 0.9 - 1.1   Manual Differential   Result Value Ref Range    Neutrophils %, Manual 73.0 40.0 - 80.0 %    Lymphocytes %, Manual 15.7 13.0 - 44.0 %    Monocytes %, Manual 8.7 2.0 - 10.0 %    Eosinophils %, Manual 2.6 0.0 - 6.0 %    Basophils %, Manual 0.0 0.0 - 2.0 %    Seg Neutrophils Absolute, Manual 8.10 (H) 1.20 - 7.00 x10*3/uL    Lymphocytes Absolute, Manual 1.74 1.20 - 4.80 x10*3/uL    Monocytes Absolute, Manual 0.97 0.10 - 1.00 x10*3/uL    Eosinophils Absolute, Manual 0.29 0.00 - 0.70 x10*3/uL    Basophils Absolute, Manual 0.00 0.00 - 0.10 x10*3/uL    Total Cells Counted 115     RBC Morphology See Below     Polychromasia Mild     Sickle Cells Few     Target Cells Few     Basophilic Stippling Present         Physical Exam                                                                                                                         Gen -  No acute distress, uncomfortable      Neuro - Alert, oriented, conversant     CV -  RR     Pulm - Symmetric chest rise, non-labored breathing on RA.      Abd - Soft, non-tender, non-distended     Ext - Warm, well perfused     Skin - without jaunice       Psych - appropriate tone, affect      -   non-erect phallus that is bendable.        Imaging  Vascular US penile artery duplex complete    Result Date: 9/15/2024  STUDY: VASC US PENILE ARTERY DUPLEX COMPLETE;   INDICATION: Signs/Symptoms:sickle cell, persistant priapism.   COMPARISON: Ultrasound penile artery duplex on 09/01/2024.   ACCESSION NUMBER(S): [id number]   ORDERING CLINICIAN:  DAE LOPEZ   TECHNIQUE: Multiplanar grayscale ultrasound images of the penis were obtained with color and spectral Doppler evaluation.   FINDINGS: Penile anatomy: The corpus cavernosa, corpus spongiosum, and urethra are within normal limits. The tunica albuginea is intact. No focal soft tissue lesion or fluid collection.   Doppler evaluation: Normal caliber of the bilateral cavernosal arteries, improved compared to prior exam. Intact arterial and venous flow within the bilateral corpus cavernosa, improved compared to prior exam. Flow is noted within the deep dorsal vein. The superficial dorsal vein was not definitively visualized on this exam. Flow is noted within the dorsal arteries bilaterally. The resistive indices in the right and left cavernosal arteries are 1.0 and 0.96 respectively. The velocities within the right and left cavernosal arteries are 88.1 cm/sec and 43.1 cm/sec respectively.       Normal caliber and peak systolic velocities of the bilateral cavernosal arteries, which have improved compared to prior exam. Persistent elevation of the resistive indices on Doppler interrogation of the visualized portions of the cavernosal arteries, which is nonspecific. Otherwise unremarkable sonographic evaluation of the penile vasculature.   I personally reviewed the images/study and I agree with the findings as stated by Dr. Lorne Quintanilla M.D. This study was interpreted at [facility], [place], [place].   MACRO: None     Dictation workstation:   [id number]      Assessment & Plan   23 y.o.  male w/PMHx most notable for sickle cell disease with recurrent priapism, and nodular lymphocyte predominant Hodgkin's lymphoma, who initially presented with chest pain and penile pain due to sickle cell pain crisis. Urology consulted for evaluation and management recommendations for priapism.     9/14: Pain improved. Exam relatively stable, penis slightly bendable although  slightly more erect  Had long conversation with patient and mother regarding recurrent ischemic priapism as well as options including PO medical management, phenylephrine injection, ABG, corporal aspiration, and surgical management including shunt and IPP. Discussed risk and benefits of each as well as the progressive damage with observation of suspected ischemic priapism. I strongly recommended treatment with at least phenylephrine injection however patient elected to wait for ketoconazole.     9/16: Penis examined, non erect and bendable.     Plan:  -Continue ketoconazole with prednisone. Patient should be discharged with this medication.   -Rest of care per primary, general sickle cell management. Consider hydroxyurea  -Urology will continue to follow     Assessment and plan discussed with chief resident Dr. Morelos and attending Dr Kuldip Kenney MD   Urology [facility]  Adult Urology Pager: [phone]  Pediatric Urology Pager: [phone]

## 2024-09-17 LAB
ALBUMIN SERPL BCP-MCNC: 4.3 G/DL (ref 3.4–5)
ALP SERPL-CCNC: 160 U/L (ref 33–120)
ALT SERPL W P-5'-P-CCNC: 32 U/L (ref 10–52)
ANION GAP SERPL CALC-SCNC: 13 MMOL/L (ref 10–20)
AST SERPL W P-5'-P-CCNC: 44 U/L (ref 9–39)
BASO STIPL BLD QL SMEAR: PRESENT
BASOPHILS # BLD AUTO: 0.03 X10*3/UL (ref 0–0.1)
BASOPHILS NFR BLD AUTO: 0.2 %
BILIRUB SERPL-MCNC: 6 MG/DL (ref 0–1.2)
BLOOD EXPIRATION DATE: NORMAL
BUN SERPL-MCNC: 9 MG/DL (ref 6–23)
CALCIUM SERPL-MCNC: 9.5 MG/DL (ref 8.6–10.6)
CHLORIDE SERPL-SCNC: 105 MMOL/L (ref 98–107)
CO2 SERPL-SCNC: 25 MMOL/L (ref 21–32)
CREAT SERPL-MCNC: 0.72 MG/DL (ref 0.5–1.3)
DISPENSE STATUS: NORMAL
EGFRCR SERPLBLD CKD-EPI 2021: >90 ML/MIN/1.73M*2
EOSINOPHIL # BLD AUTO: 0.26 X10*3/UL (ref 0–0.7)
EOSINOPHIL NFR BLD AUTO: 2 %
ERYTHROCYTE [DISTWIDTH] IN BLOOD BY AUTOMATED COUNT: 21.2 % (ref 11.5–14.5)
GLUCOSE SERPL-MCNC: 81 MG/DL (ref 74–99)
HCT VFR BLD AUTO: 21.6 % (ref 41–52)
HEMOGLOBIN A2: 3.3 % (ref 2–3.5)
HEMOGLOBIN A: 33.3 % (ref 95.8–98)
HEMOGLOBIN F: 2 % (ref 0–2)
HEMOGLOBIN IDENTIFICATION INTERPRETATION: ABNORMAL
HEMOGLOBIN S: 61.4 %
HGB BLD-MCNC: 7.6 G/DL (ref 13.5–17.5)
HGB RETIC QN: 30 PG (ref 28–38)
IMM GRANULOCYTES # BLD AUTO: 0.13 X10*3/UL (ref 0–0.7)
IMM GRANULOCYTES NFR BLD AUTO: 1 % (ref 0–0.9)
IMMATURE RETIC FRACTION: 14.3 %
LDH SERPL L TO P-CCNC: 392 U/L (ref 84–246)
LYMPHOCYTES # BLD AUTO: 2.22 X10*3/UL (ref 1.2–4.8)
LYMPHOCYTES NFR BLD AUTO: 17.2 %
MCH RBC QN AUTO: 30.5 PG (ref 26–34)
MCHC RBC AUTO-ENTMCNC: 35.2 G/DL (ref 32–36)
MCV RBC AUTO: 87 FL (ref 80–100)
MONOCYTES # BLD AUTO: 1.42 X10*3/UL (ref 0.1–1)
MONOCYTES NFR BLD AUTO: 11 %
NEUTROPHILS # BLD AUTO: 8.82 X10*3/UL (ref 1.2–7.7)
NEUTROPHILS NFR BLD AUTO: 68.6 %
NRBC BLD-RTO: 2 /100 WBCS (ref 0–0)
OVALOCYTES BLD QL SMEAR: NORMAL
PAPPENHEIMER BOD BLD QL SMEAR: PRESENT
PATH REVIEW-HGB IDENTIFICATION: ABNORMAL
PLATELET # BLD AUTO: 429 X10*3/UL (ref 150–450)
POLYCHROMASIA BLD QL SMEAR: NORMAL
POTASSIUM SERPL-SCNC: 4.2 MMOL/L (ref 3.5–5.3)
PRODUCT BLOOD TYPE: 1700
PRODUCT CODE: NORMAL
PROT SERPL-MCNC: 6.8 G/DL (ref 6.4–8.2)
RBC # BLD AUTO: 2.49 X10*6/UL (ref 4.5–5.9)
RBC MORPH BLD: NORMAL
RETICS #: 0.45 X10*6/UL (ref 0.02–0.12)
RETICS/RBC NFR AUTO: 18 % (ref 0.5–2)
SICKLE CELLS BLD QL SMEAR: NORMAL
SODIUM SERPL-SCNC: 139 MMOL/L (ref 136–145)
TARGETS BLD QL SMEAR: NORMAL
UNIT ABO: NORMAL
UNIT NUMBER: NORMAL
UNIT RH: NORMAL
UNIT VOLUME: 286
UNIT VOLUME: 318
UNIT VOLUME: 318
UNIT VOLUME: 350
WBC # BLD AUTO: 12.9 X10*3/UL (ref 4.4–11.3)
XM INTEP: NORMAL

## 2024-09-17 PROCEDURE — 2500000004 HC RX 250 GENERAL PHARMACY W/ HCPCS (ALT 636 FOR OP/ED)

## 2024-09-17 PROCEDURE — 2500000001 HC RX 250 WO HCPCS SELF ADMINISTERED DRUGS (ALT 637 FOR MEDICARE OP): Performed by: NURSE PRACTITIONER

## 2024-09-17 PROCEDURE — 2500000001 HC RX 250 WO HCPCS SELF ADMINISTERED DRUGS (ALT 637 FOR MEDICARE OP)

## 2024-09-17 PROCEDURE — 36415 COLL VENOUS BLD VENIPUNCTURE: CPT

## 2024-09-17 PROCEDURE — 2500000004 HC RX 250 GENERAL PHARMACY W/ HCPCS (ALT 636 FOR OP/ED): Performed by: NURSE PRACTITIONER

## 2024-09-17 PROCEDURE — 80053 COMPREHEN METABOLIC PANEL: CPT

## 2024-09-17 PROCEDURE — 99233 SBSQ HOSP IP/OBS HIGH 50: CPT | Performed by: NURSE PRACTITIONER

## 2024-09-17 PROCEDURE — 85025 COMPLETE CBC W/AUTO DIFF WBC: CPT

## 2024-09-17 PROCEDURE — 83615 LACTATE (LD) (LDH) ENZYME: CPT

## 2024-09-17 PROCEDURE — 2500000002 HC RX 250 W HCPCS SELF ADMINISTERED DRUGS (ALT 637 FOR MEDICARE OP, ALT 636 FOR OP/ED)

## 2024-09-17 PROCEDURE — 2500000005 HC RX 250 GENERAL PHARMACY W/O HCPCS: Performed by: PHYSICIAN ASSISTANT

## 2024-09-17 PROCEDURE — 1170000001 HC PRIVATE ONCOLOGY ROOM DAILY

## 2024-09-17 PROCEDURE — 85045 AUTOMATED RETICULOCYTE COUNT: CPT

## 2024-09-17 RX ORDER — HYDROMORPHONE HYDROCHLORIDE 1 MG/ML
1 INJECTION, SOLUTION INTRAMUSCULAR; INTRAVENOUS; SUBCUTANEOUS ONCE
Status: COMPLETED | OUTPATIENT
Start: 2024-09-17 | End: 2024-09-17

## 2024-09-17 RX ORDER — HYDROMORPHONE HYDROCHLORIDE 1 MG/ML
1 INJECTION, SOLUTION INTRAMUSCULAR; INTRAVENOUS; SUBCUTANEOUS EVERY 2 HOUR PRN
Status: DISCONTINUED | OUTPATIENT
Start: 2024-09-17 | End: 2024-09-18

## 2024-09-17 RX ADMIN — HYDROMORPHONE HYDROCHLORIDE 1 MG: 1 INJECTION, SOLUTION INTRAMUSCULAR; INTRAVENOUS; SUBCUTANEOUS at 13:09

## 2024-09-17 RX ADMIN — PSEUDOEPHEDRINE HCL 60 MG: 30 TABLET, FILM COATED ORAL at 00:35

## 2024-09-17 RX ADMIN — HYDROMORPHONE HYDROCHLORIDE 1 MG: 1 INJECTION, SOLUTION INTRAMUSCULAR; INTRAVENOUS; SUBCUTANEOUS at 19:47

## 2024-09-17 RX ADMIN — BACLOFEN 5 MG: 10 TABLET ORAL at 16:02

## 2024-09-17 RX ADMIN — PSEUDOEPHEDRINE HCL 60 MG: 30 TABLET, FILM COATED ORAL at 18:20

## 2024-09-17 RX ADMIN — HYDROMORPHONE HYDROCHLORIDE 1 MG: 1 INJECTION, SOLUTION INTRAMUSCULAR; INTRAVENOUS; SUBCUTANEOUS at 16:16

## 2024-09-17 RX ADMIN — Medication 2 L/MIN: at 21:21

## 2024-09-17 RX ADMIN — HYDROMORPHONE HYDROCHLORIDE 1 MG: 1 INJECTION, SOLUTION INTRAMUSCULAR; INTRAVENOUS; SUBCUTANEOUS at 05:59

## 2024-09-17 RX ADMIN — HYDROMORPHONE HYDROCHLORIDE 1 MG: 1 INJECTION, SOLUTION INTRAMUSCULAR; INTRAVENOUS; SUBCUTANEOUS at 11:50

## 2024-09-17 RX ADMIN — SENNOSIDES AND DOCUSATE SODIUM 2 TABLET: 8.6; 5 TABLET ORAL at 09:24

## 2024-09-17 RX ADMIN — HYDROMORPHONE HYDROCHLORIDE 1 MG: 1 INJECTION, SOLUTION INTRAMUSCULAR; INTRAVENOUS; SUBCUTANEOUS at 23:24

## 2024-09-17 RX ADMIN — FOLIC ACID 1 MG: 1 TABLET ORAL at 09:24

## 2024-09-17 RX ADMIN — GABAPENTIN 100 MG: 100 CAPSULE ORAL at 21:23

## 2024-09-17 RX ADMIN — HYDROMORPHONE HYDROCHLORIDE 1 MG: 1 INJECTION, SOLUTION INTRAMUSCULAR; INTRAVENOUS; SUBCUTANEOUS at 02:37

## 2024-09-17 RX ADMIN — PSEUDOEPHEDRINE HCL 60 MG: 30 TABLET, FILM COATED ORAL at 09:24

## 2024-09-17 RX ADMIN — SENNOSIDES AND DOCUSATE SODIUM 2 TABLET: 8.6; 5 TABLET ORAL at 21:23

## 2024-09-17 RX ADMIN — KETOCONAZOLE 200 MG: 200 TABLET ORAL at 09:24

## 2024-09-17 RX ADMIN — KETOCONAZOLE 200 MG: 200 TABLET ORAL at 23:24

## 2024-09-17 RX ADMIN — GABAPENTIN 100 MG: 100 CAPSULE ORAL at 05:59

## 2024-09-17 RX ADMIN — PREDNISONE 5 MG: 10 TABLET ORAL at 09:24

## 2024-09-17 RX ADMIN — GABAPENTIN 100 MG: 100 CAPSULE ORAL at 16:02

## 2024-09-17 RX ADMIN — HYDROMORPHONE HYDROCHLORIDE 1 MG: 1 INJECTION, SOLUTION INTRAMUSCULAR; INTRAVENOUS; SUBCUTANEOUS at 18:20

## 2024-09-17 RX ADMIN — BACLOFEN 5 MG: 10 TABLET ORAL at 09:24

## 2024-09-17 RX ADMIN — HYDROMORPHONE HYDROCHLORIDE 1 MG: 1 INJECTION, SOLUTION INTRAMUSCULAR; INTRAVENOUS; SUBCUTANEOUS at 21:23

## 2024-09-17 RX ADMIN — PANTOPRAZOLE SODIUM 20 MG: 20 TABLET, DELAYED RELEASE ORAL at 09:28

## 2024-09-17 RX ADMIN — HYDROMORPHONE HYDROCHLORIDE 1 MG: 1 INJECTION, SOLUTION INTRAMUSCULAR; INTRAVENOUS; SUBCUTANEOUS at 09:24

## 2024-09-17 RX ADMIN — BACLOFEN 5 MG: 10 TABLET ORAL at 21:22

## 2024-09-17 RX ADMIN — Medication 2 L/MIN: at 09:33

## 2024-09-17 ASSESSMENT — COGNITIVE AND FUNCTIONAL STATUS - GENERAL
DAILY ACTIVITIY SCORE: 24
DAILY ACTIVITIY SCORE: 24
MOBILITY SCORE: 24
MOBILITY SCORE: 24

## 2024-09-17 ASSESSMENT — PAIN - FUNCTIONAL ASSESSMENT
PAIN_FUNCTIONAL_ASSESSMENT: 0-10

## 2024-09-17 ASSESSMENT — PAIN SCALES - GENERAL
PAINLEVEL_OUTOF10: 9
PAINLEVEL_OUTOF10: 8
PAINLEVEL_OUTOF10: 9
PAINLEVEL_OUTOF10: 8
PAINLEVEL_OUTOF10: 6
PAINLEVEL_OUTOF10: 8
PAINLEVEL_OUTOF10: 8
PAINLEVEL_OUTOF10: 6
PAINLEVEL_OUTOF10: 6
PAINLEVEL_OUTOF10: 9
PAINLEVEL_OUTOF10: 8
PAINLEVEL_OUTOF10: 6

## 2024-09-17 ASSESSMENT — PAIN DESCRIPTION - LOCATION: LOCATION: GROIN

## 2024-09-17 NOTE — PROGRESS NOTES
"Joellen Narayan is a 23 y.o. male on day 4 of admission presenting with Sickle cell crisis (Multi).    Subjective   Pt feeling ok this morning, initially discussed going home today and then pt stated he didn't feel ready yet. He will call Dr. Cruz's office to reschedule and to request pain med refill. Erection is improved but not resolved, still declining Urology intervention besides pills. Declining RBC exchange.     Denies any HA, dizziness/lightheadedness, fevers/chills, cough, congestion, rhinorrhea, sore throat, SOB, CP, palpitations, abdominal pain, n/v/d/c, melena, dysuria, urgency/frequency, hematuria, numbness/tingling, bruising/bleeding or rashes. Denies any sick contacts. ROS otherwise negative.       Objective     Physical Exam  Vitals reviewed.   Constitutional:       Appearance: Normal appearance.   HENT:      Head: Normocephalic and atraumatic.      Nose: Nose normal.      Mouth/Throat:      Mouth: Mucous membranes are moist.      Pharynx: Oropharynx is clear.   Eyes:      Extraocular Movements: Extraocular movements intact.      Pupils: Pupils are equal, round, and reactive to light.   Cardiovascular:      Rate and Rhythm: Normal rate and regular rhythm.      Pulses: Normal pulses.      Heart sounds: Normal heart sounds.   Pulmonary:      Effort: Pulmonary effort is normal.      Breath sounds: Normal breath sounds.   Abdominal:      General: Bowel sounds are normal.      Palpations: Abdomen is soft.   Musculoskeletal:         General: Normal range of motion.   Skin:     General: Skin is warm.   Neurological:      General: No focal deficit present.      Mental Status: He is alert and oriented to person, place, and time. Mental status is at baseline.   Psychiatric:         Mood and Affect: Mood normal.         Behavior: Behavior normal.       Last Recorded Vitals  Blood pressure 113/60, pulse 70, temperature 37.5 °C (99.5 °F), resp. rate 16, height 1.88 m (6' 2\"), weight 69.4 kg (153 lb), SpO2 95%.   "           Assessment/Plan   Assessment & Plan  Sickle cell crisis (Multi)  Joellen Narayan is a 23 y.o. 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 CXR (9/12) negative for acute cardiopulmonary process. Urology following patient for priapism, upon presentation, patient did not agree to bedside drainage and corporal blood gas. 9/13 started on Ketoconazole 200mg BID and prednisone 5mg daily per urology recs. Penile doppler US ordered 9/15 per urology, showing normal caliber and peak systolic velocities of b/l cavernosal arteries which have improved since prior. Hematology consulted on 9/13 give persistent priapism x1 month, recommend emergent RBCex. 9/13 plan for apheresis line placement in the ED followed by emergent RBCex, however, patient declined placement of line in ED on 9/13, plan for apheresis line placement with IR followed by RBCex on 9/16. On 9/16 he then again declined apheresis line placement and RBCEx. Hemoglobin and lysis labs at patients baseline. For pain, patient continues on IV dilaudid. Discharge pending improvement in pain and stability of priapism.     # Priapism  - Presented to the ED with worsening priapism since 5pm (hadn't resolved from last admission)  - s/p recent admission 8/31-9/9, OR with urology for priapism drainage, penile block, and corpora cavernosa irrigation  - Also, s/p scheduled Sudafed q8h and added ibuprofen, baclofen 5mg TID and gabapentin 100mg TID.   - Penile doppler US while prior inpatient (9/1) showing small caliber of  bilateral cavernosal arteries with lack of color flow in portions of both arteries, more on the right, c/w low-flow/ischemic priapism. Urology notified, s/p phenylephrine injection with urology 9/1 evening with partial resolution of priapism  - Reassessed by Urology 9/7/24 reevaluate penile appearance; firm but still bendable with no increased pain. Rec to continue management as prior and to maintain outpatient appointment on 9/10/24.  - Outpatient appointment 9/10 prescribed Flutamide 125mg TID but per parent, no pharmacy has this medication so it was not started  - Doppler U/S completed outpatient 9/10 noted arterial flow  - Urology following rec bedside drainage and corporal blood gas and phenylepinephine injection --patient refusing; also rec starting treatment with ketoconazole with prednisone, 9/15 rec penile doppler US    - Penile doppler US (9/15) per urology, showing normal caliber and peak systolic velocities of b/l cavernosal arteries which have improved since prior.   - Heme following and rec emergent RBCex in setting of prolonged priapism  - S/p Sudafed 60mg x 1 in ED  - Continue home Sudafed q8h prn and added ibuprofen, Baclofen 5mg TID and Gabapentin 100mg TID.  - 9/13 started on ketoconazole 200mg BID and Prednisone 5mg daily-per urology recs   - C. Trachomatis/N. Gonorrhoeae sent- negative (9/14)   - Initially planned for aphesis line placement followed by RBCex on 9/13, however, patient declined placement of apheresis line in the ED, primary, hematology and urology team explained risks to patient and he continued to decline   - Planed for apheresis line placement in IR on 9/16 followed by RBCex - hematology, Dr. Cruz, and trransfusion med team aware--> however on 9/16 pt declined apheresis line placement and RBCEx again  - 9/16 pt was educated on risks of refusing/delaying medical care and recurrent priapism     # Hgb SS with severe pain  - OARRS reviewed, no aberrancies  - No current care path  -  Hgb baseline ~8.5; currently stable, no indication for simple transfusion   - Tbili baseline fluctuates ~5-1; Tbili 4.4 on 9/16, up to 6.0 on 9/17  - LDH baseline ~500   on 9/16  - s/p IV Dilaudid 2mg q2hrs PRN severe pain (9/12-9/16)  - 9/16 weaned pain meds to IV dilaudid 1mg q3hrs PRN severe pain  - Continue folic acid 1mg daily  - Hgb S (9/12): 59.8%   - Heme following (see above)  - PO Zofran PRN for opioid-induced nausea, PO Benadryl PRN for opioid-induced pruritus, Bowel regimen for opioid-induced constipation with DocuSenna 2tabs BID and Miralax daily      # Nodular lymphocyte predominant Hodgkins lymphoma (NLPHL)   - Primary Oncologist: Dr. Stoll  - Enlarged lymph nodes noted 4/1/22  - RUQ US (11/14/22) with mildly enlarged LNs in the region of the kavin hepatis  - MRI liver (11/16/22) showed re-demonstration of bulky retroperitoneal lymphadenopathy and kavin hepatic lymphadenopathy    - (11/18/22) lymph node biopsy showed atypical lymphoid infiltrate. Reviewed by Hemepath board, insufficient for lymphoma diagnosis  - PET/CT (12/6/22) showing retroperitoneal lymphadenopathy  - Followed up with Dr. Stoll (12/16/22) with plan for surg/onc consult for large tissue bx but patient missed apt and was lost to follow up  - Requested new apt with Dr. Ronnie Marte 6/19/23, patient was not seen and lost to follow up  - CT a/p (11/28/23) increased size of retroperitoneal lymph nodes reflecting extramedullary hematopoiesis I/s/o sickle cell vs lymphoma  - Paraaortic LN bx via IR (11/30/23) consistent with NLPHL. Flow: no clonal B cell or T cell population identified, lymphocyte 95%, CD3+CD4+ 68%, CD3+CD8+ 7%, CD19+ 24%  - Elevated LDH/bili partially from sickle cell disease   - Chemotherapy (R-CHOP) was discussed with primary oncologist Dr. Stoll, and decision was made to simplify his chemotherapy to Rituxan and prednisone q3 weeks mainly due to frequent sickle cell crisis  - Current chemo regimen: Rituxan and  "prednisone q3 weeks (C1 1/18/24, C2 2/8/24, Missed C3 d/t sickle cell pain crisis, C4 4/4/24, C5 5/16/24, C6 6/7/24)  - Per pt no longer taking Acyclovir 400mg BID prophy   - PET CT (7/25) overall showing great response to treatment with persistent residual viable disease involving a left common iliac node  - Last FUV with Dr. Stoll 7/25/24 rec RTC in 2 months (next FUV scheduled for 9/19)     # Choledocholithiasis  - s/p ERCP 7/18/24 with a biliary sphincterotomy where sludge and stones were removed, achieving complete clearance  - Seen by gen surg 8/8/24 Dr. Dove who rec lap cholecystectomy d/t the chance of recurrence of choledocholithiasis  - Surgery has yet to be scheduled, has FUV with gen surg again 9/30  - Tbili 4.4 on 9/16, which is markedly improved, recent peak at 52.8 prior to ERCP during recent hospital admission     # Hx of nocturnal oxygen dependence and hypoxia on room air  - Has not had home oxygen for 2-3 years   - Wean as able, encourage incentive spirometry  - Pulm FUV 8/8- \"Given his history of sickle cell disease, it is important to ensure he has formal sleep testing to look at desaturations and presence of sleep apnea despite not having a convincing history/body habitus typical for suspecting sleep apnea- patients with sickle cell have a higher prevalence of sleep disorders including nocturnal hypoxemia\"--> rec sleep referral for formal testing if deemed appropriate, ABG and O2 destat testing, and TTE with bubble study  - TTE 8/23 showing EF 60-65%, severely dilated LV and LA, + intrapulmonary shunting       DVT prophy: Lovenox subcutaneous, SCDs, encourage ambulation     DISPO:  - Full code, confirmed on admit  - Discharge pending improvement in pain and stability of priapism, poss 9/18  - SUSIE Narayan (Parent) [phone]  - Sickle cell FUV 9/18 (pt to reschedule on own), Dr. Stoll FUV 9/19, Gen Surg FUV 9/30, Urology FUV 10/7       I spent 60 minutes in the professional and " overall care of this patient.    Milana Goetz, APRN-CNP

## 2024-09-17 NOTE — ASSESSMENT & PLAN NOTE
Joellen Narayan is a 23 y.o. 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 CXR (9/12) negative for acute cardiopulmonary process. Urology following patient for priapism, upon presentation, patient did not agree to bedside drainage and corporal blood gas. 9/13 started on Ketoconazole 200mg BID and prednisone 5mg daily per urology recs. Penile doppler US ordered 9/15 per urology, showing normal caliber and peak systolic velocities of b/l cavernosal arteries which have improved since prior. Hematology consulted on 9/13 give persistent priapism x1 month, recommend emergent RBCex. 9/13 plan for apheresis line placement in the ED followed by emergent RBCex, however, patient declined placement of line in ED on 9/13, plan for apheresis line placement with IR followed by RBCex on 9/16. On 9/16 he then again declined apheresis line placement and RBCEx. Hemoglobin and lysis labs at patients baseline. For pain, patient continues on IV dilaudid. Discharge pending improvement in pain and stability of priapism.     # Priapism  - Presented to the ED with worsening priapism since 5pm (hadn't resolved from last admission)  - s/p recent admission 8/31-9/9, OR with urology for priapism drainage, penile block, and corpora cavernosa irrigation  - Also, s/p scheduled Sudafed q8h and added ibuprofen, baclofen 5mg TID and gabapentin 100mg TID.   - Penile doppler US while prior inpatient (9/1) showing small caliber of bilateral cavernosal arteries with lack of color flow in portions of both  arteries, more on the right, c/w low-flow/ischemic priapism. Urology notified, s/p phenylephrine injection with urology 9/1 evening with partial resolution of priapism  - Reassessed by Urology 9/7/24 reevaluate penile appearance; firm but still bendable with no increased pain. Rec to continue management as prior and to maintain outpatient appointment on 9/10/24.  - Outpatient appointment 9/10 prescribed Flutamide 125mg TID but per parent, no pharmacy has this medication so it was not started  - Doppler U/S completed outpatient 9/10 noted arterial flow  - Urology following rec bedside drainage and corporal blood gas and phenylepinephine injection --patient refusing; also rec starting treatment with ketoconazole with prednisone, 9/15 rec penile doppler US    - Penile doppler US (9/15) per urology, showing normal caliber and peak systolic velocities of b/l cavernosal arteries which have improved since prior.   - Heme following and rec emergent RBCex in setting of prolonged priapism  - S/p Sudafed 60mg x 1 in ED  - Continue home Sudafed q8h prn and added ibuprofen, Baclofen 5mg TID and Gabapentin 100mg TID.  - 9/13 started on ketoconazole 200mg BID and Prednisone 5mg daily-per urology recs   - C. Trachomatis/N. Gonorrhoeae sent- negative (9/14)   - Initially planned for aphesis line placement followed by RBCex on 9/13, however, patient declined placement of apheresis line in the ED, primary, hematology and urology team explained risks to patient and he continued to decline   - Planed for apheresis line placement in IR on 9/16 followed by RBCex - hematology, Dr. Cruz, and trransfusion med team aware--> however on 9/16 pt declined apheresis line placement and RBCEx again  - 9/16 pt was educated on risks of refusing/delaying medical care and recurrent priapism     # Hgb SS with severe pain  - OARRS reviewed, no aberrancies  - No current care path  - Hgb baseline ~8.5; currently stable, no indication for simple transfusion    - Tbili baseline fluctuates ~5-1; Tbili 4.4 on 9/16, up to 6.0 on 9/17  - LDH baseline ~500   on 9/16  - s/p IV Dilaudid 2mg q2hrs PRN severe pain (9/12-9/16)  - 9/16 weaned pain meds to IV dilaudid 1mg q3hrs PRN severe pain  - Continue folic acid 1mg daily  - Hgb S (9/12): 59.8%   - Heme following (see above)  - PO Zofran PRN for opioid-induced nausea, PO Benadryl PRN for opioid-induced pruritus, Bowel regimen for opioid-induced constipation with DocuSenna 2tabs BID and Miralax daily      # Nodular lymphocyte predominant Hodgkins lymphoma (NLPHL)   - Primary Oncologist: Dr. Stoll  - Enlarged lymph nodes noted 4/1/22  - RUQ US (11/14/22) with mildly enlarged LNs in the region of the kavin hepatis  - MRI liver (11/16/22) showed re-demonstration of bulky retroperitoneal lymphadenopathy and kavin hepatic lymphadenopathy    - (11/18/22) lymph node biopsy showed atypical lymphoid infiltrate. Reviewed by Hemepath board, insufficient for lymphoma diagnosis  - PET/CT (12/6/22) showing retroperitoneal lymphadenopathy  - Followed up with Dr. Stoll (12/16/22) with plan for surg/onc consult for large tissue bx but patient missed apt and was lost to follow up  - Requested new apt with Dr. Ronnie Marte 6/19/23, patient was not seen and lost to follow up  - CT a/p (11/28/23) increased size of retroperitoneal lymph nodes reflecting extramedullary hematopoiesis I/s/o sickle cell vs lymphoma  - Paraaortic LN bx via IR (11/30/23) consistent with NLPHL. Flow: no clonal B cell or T cell population identified, lymphocyte 95%, CD3+CD4+ 68%, CD3+CD8+ 7%, CD19+ 24%  - Elevated LDH/bili partially from sickle cell disease   - Chemotherapy (R-CHOP) was discussed with primary oncologist Dr. Stoll, and decision was made to simplify his chemotherapy to Rituxan and prednisone q3 weeks mainly due to frequent sickle cell crisis  - Current chemo regimen: Rituxan and prednisone q3 weeks (C1 1/18/24, C2 2/8/24, Missed C3 d/t sickle cell  "pain crisis, C4 4/4/24, C5 5/16/24, C6 6/7/24)  - Per pt no longer taking Acyclovir 400mg BID prophy   - PET CT (7/25) overall showing great response to treatment with persistent residual viable disease involving a left common iliac node  - Last FUV with Dr. Stoll 7/25/24 rec RTC in 2 months (next FUV scheduled for 9/19)     # Choledocholithiasis  - s/p ERCP 7/18/24 with a biliary sphincterotomy where sludge and stones were removed, achieving complete clearance  - Seen by gen surg 8/8/24 Dr. Dove who rec lap cholecystectomy d/t the chance of recurrence of choledocholithiasis  - Surgery has yet to be scheduled, has FUV with gen surg again 9/30  - Tbili 4.4 on 9/16, which is markedly improved, recent peak at 52.8 prior to ERCP during recent hospital admission     # Hx of nocturnal oxygen dependence and hypoxia on room air  - Has not had home oxygen for 2-3 years   - Wean as able, encourage incentive spirometry  - Pulm FUV 8/8- \"Given his history of sickle cell disease, it is important to ensure he has formal sleep testing to look at desaturations and presence of sleep apnea despite not having a convincing history/body habitus typical for suspecting sleep apnea- patients with sickle cell have a higher prevalence of sleep disorders including nocturnal hypoxemia\"--> rec sleep referral for formal testing if deemed appropriate, ABG and O2 destat testing, and TTE with bubble study  - TTE 8/23 showing EF 60-65%, severely dilated LV and LA, + intrapulmonary shunting       DVT prophy: Lovenox subcutaneous, SCDs, encourage ambulation     DISPO:  - Full code, confirmed on admit  - Discharge pending improvement in pain and stability of priapism, poss 9/18  - SUSIE Narayan (Parent) [phone]  - Sickle cell FUV 9/18 (pt to reschedule on own), Dr. Stoll FUV 9/19, Gen Surg FUV 9/30, Urology FUV 10/7  "

## 2024-09-18 ENCOUNTER — APPOINTMENT (OUTPATIENT)
Dept: RADIOLOGY | Facility: HOSPITAL | Age: 24
DRG: 812 | End: 2024-09-18
Payer: COMMERCIAL

## 2024-09-18 ENCOUNTER — APPOINTMENT (OUTPATIENT)
Dept: HEMATOLOGY/ONCOLOGY | Facility: HOSPITAL | Age: 24
End: 2024-09-18
Payer: COMMERCIAL

## 2024-09-18 LAB
1OH-MIDAZOLAM UR CFM-MCNC: <25 NG/ML
6MAM UR CFM-MCNC: <25 NG/ML
7AMINOCLONAZEPAM UR CFM-MCNC: <25 NG/ML
A-OH ALPRAZ UR CFM-MCNC: <25 NG/ML
ABO GROUP (TYPE) IN BLOOD: NORMAL
ALBUMIN SERPL BCP-MCNC: 4.8 G/DL (ref 3.4–5)
ALP SERPL-CCNC: 161 U/L (ref 33–120)
ALPRAZ UR CFM-MCNC: <25 NG/ML
ALT SERPL W P-5'-P-CCNC: 35 U/L (ref 10–52)
ANION GAP SERPL CALC-SCNC: 13 MMOL/L (ref 10–20)
ANTIBODY SCREEN: NORMAL
AST SERPL W P-5'-P-CCNC: 44 U/L (ref 9–39)
BASOPHILS # BLD AUTO: 0.07 X10*3/UL (ref 0–0.1)
BASOPHILS NFR BLD AUTO: 0.3 %
BILIRUB DIRECT SERPL-MCNC: 1.5 MG/DL (ref 0–0.3)
BILIRUB SERPL-MCNC: 8 MG/DL (ref 0–1.2)
BUN SERPL-MCNC: 8 MG/DL (ref 6–23)
CA-I BLD-SCNC: 1.24 MMOL/L (ref 1.1–1.33)
CALCIUM SERPL-MCNC: 10.2 MG/DL (ref 8.6–10.6)
CHLORDIAZEP UR CFM-MCNC: <25 NG/ML
CHLORIDE SERPL-SCNC: 101 MMOL/L (ref 98–107)
CLONAZEPAM UR CFM-MCNC: <25 NG/ML
CO2 SERPL-SCNC: 28 MMOL/L (ref 21–32)
CODEINE UR CFM-MCNC: <50 NG/ML
CREAT SERPL-MCNC: 0.69 MG/DL (ref 0.5–1.3)
DIAZEPAM UR CFM-MCNC: <25 NG/ML
EDDP UR CFM-MCNC: <25 NG/ML
EGFRCR SERPLBLD CKD-EPI 2021: >90 ML/MIN/1.73M*2
EOSINOPHIL # BLD AUTO: 0.27 X10*3/UL (ref 0–0.7)
EOSINOPHIL NFR BLD AUTO: 1.3 %
ERYTHROCYTE [DISTWIDTH] IN BLOOD BY AUTOMATED COUNT: 20.2 % (ref 11.5–14.5)
FENTANYL UR CFM-MCNC: <2.5 NG/ML
GLUCOSE SERPL-MCNC: 73 MG/DL (ref 74–99)
HCT VFR BLD AUTO: 23.2 % (ref 41–52)
HEMOGLOBIN A2: 3.2 % (ref 2–3.5)
HEMOGLOBIN A: 34.6 % (ref 95.8–98)
HEMOGLOBIN F: 2.3 % (ref 0–2)
HEMOGLOBIN IDENTIFICATION INTERPRETATION: ABNORMAL
HEMOGLOBIN S: 59.9 %
HGB BLD-MCNC: 8.1 G/DL (ref 13.5–17.5)
HGB RETIC QN: 31 PG (ref 28–38)
HYDROCODONE CTO UR CFM-MCNC: <25 NG/ML
HYDROMORPHONE UR CFM-MCNC: 462 NG/ML
IMM GRANULOCYTES # BLD AUTO: 0.12 X10*3/UL (ref 0–0.7)
IMM GRANULOCYTES NFR BLD AUTO: 0.6 % (ref 0–0.9)
IMMATURE RETIC FRACTION: 14.1 %
LDH SERPL L TO P-CCNC: 451 U/L (ref 84–246)
LORAZEPAM UR CFM-MCNC: <25 NG/ML
LYMPHOCYTES # BLD AUTO: 1.77 X10*3/UL (ref 1.2–4.8)
LYMPHOCYTES NFR BLD AUTO: 8.8 %
MAGNESIUM SERPL-MCNC: 1.94 MG/DL (ref 1.6–2.4)
MCH RBC QN AUTO: 29.7 PG (ref 26–34)
MCHC RBC AUTO-ENTMCNC: 34.9 G/DL (ref 32–36)
MCV RBC AUTO: 85 FL (ref 80–100)
METHADONE UR CFM-MCNC: <25 NG/ML
MIDAZOLAM UR CFM-MCNC: <25 NG/ML
MONOCYTES # BLD AUTO: 1.98 X10*3/UL (ref 0.1–1)
MONOCYTES NFR BLD AUTO: 9.9 %
MORPHINE UR CFM-MCNC: <50 NG/ML
NEUTROPHILS # BLD AUTO: 15.84 X10*3/UL (ref 1.2–7.7)
NEUTROPHILS NFR BLD AUTO: 79.1 %
NORDIAZEPAM UR CFM-MCNC: <25 NG/ML
NORFENTANYL UR CFM-MCNC: <2.5 NG/ML
NORHYDROCODONE UR CFM-MCNC: <25 NG/ML
NOROXYCODONE UR CFM-MCNC: >1000 NG/ML
NORTRAMADOL UR-MCNC: <50 NG/ML
NRBC BLD-RTO: 0.8 /100 WBCS (ref 0–0)
OXAZEPAM UR CFM-MCNC: <25 NG/ML
OXYCODONE UR CFM-MCNC: 58 NG/ML
OXYMORPHONE UR CFM-MCNC: >2500 NG/ML
PAPPENHEIMER BOD BLD QL SMEAR: PRESENT
PATH REVIEW-HGB IDENTIFICATION: ABNORMAL
PLATELET # BLD AUTO: 469 X10*3/UL (ref 150–450)
POLYCHROMASIA BLD QL SMEAR: NORMAL
POTASSIUM SERPL-SCNC: 4.1 MMOL/L (ref 3.5–5.3)
PROT SERPL-MCNC: 7.6 G/DL (ref 6.4–8.2)
RBC # BLD AUTO: 2.73 X10*6/UL (ref 4.5–5.9)
RBC MORPH BLD: NORMAL
RETICS #: 0.45 X10*6/UL (ref 0.02–0.12)
RETICS/RBC NFR AUTO: 16.4 % (ref 0.5–2)
RH FACTOR (ANTIGEN D): NORMAL
SICKLE CELLS BLD QL SMEAR: NORMAL
SODIUM SERPL-SCNC: 138 MMOL/L (ref 136–145)
TEMAZEPAM UR CFM-MCNC: <25 NG/ML
TRAMADOL UR CFM-MCNC: <50 NG/ML
WBC # BLD AUTO: 20.1 X10*3/UL (ref 4.4–11.3)
ZOLPIDEM UR CFM-MCNC: <25 NG/ML
ZOLPIDEM UR-MCNC: <25 NG/ML

## 2024-09-18 PROCEDURE — 2500000001 HC RX 250 WO HCPCS SELF ADMINISTERED DRUGS (ALT 637 FOR MEDICARE OP)

## 2024-09-18 PROCEDURE — C1769 GUIDE WIRE: HCPCS

## 2024-09-18 PROCEDURE — 83020 HEMOGLOBIN ELECTROPHORESIS: CPT | Performed by: PATHOLOGY

## 2024-09-18 PROCEDURE — 2500000001 HC RX 250 WO HCPCS SELF ADMINISTERED DRUGS (ALT 637 FOR MEDICARE OP): Performed by: NURSE PRACTITIONER

## 2024-09-18 PROCEDURE — 86901 BLOOD TYPING SEROLOGIC RH(D): CPT | Performed by: NURSE PRACTITIONER

## 2024-09-18 PROCEDURE — 76705 ECHO EXAM OF ABDOMEN: CPT

## 2024-09-18 PROCEDURE — 82330 ASSAY OF CALCIUM: CPT | Performed by: NURSE PRACTITIONER

## 2024-09-18 PROCEDURE — 2500000004 HC RX 250 GENERAL PHARMACY W/ HCPCS (ALT 636 FOR OP/ED)

## 2024-09-18 PROCEDURE — 2500000004 HC RX 250 GENERAL PHARMACY W/ HCPCS (ALT 636 FOR OP/ED): Performed by: NURSE PRACTITIONER

## 2024-09-18 PROCEDURE — 85660 RBC SICKLE CELL TEST: CPT

## 2024-09-18 PROCEDURE — 83021 HEMOGLOBIN CHROMOTOGRAPHY: CPT | Performed by: NURSE PRACTITIONER

## 2024-09-18 PROCEDURE — 02HV33Z INSERTION OF INFUSION DEVICE INTO SUPERIOR VENA CAVA, PERCUTANEOUS APPROACH: ICD-10-PCS | Performed by: RADIOLOGY

## 2024-09-18 PROCEDURE — 82248 BILIRUBIN DIRECT: CPT | Performed by: NURSE PRACTITIONER

## 2024-09-18 PROCEDURE — 86920 COMPATIBILITY TEST SPIN: CPT

## 2024-09-18 PROCEDURE — 2500000005 HC RX 250 GENERAL PHARMACY W/O HCPCS: Performed by: PHYSICIAN ASSISTANT

## 2024-09-18 PROCEDURE — 99152 MOD SED SAME PHYS/QHP 5/>YRS: CPT

## 2024-09-18 PROCEDURE — C1894 INTRO/SHEATH, NON-LASER: HCPCS

## 2024-09-18 PROCEDURE — 85045 AUTOMATED RETICULOCYTE COUNT: CPT

## 2024-09-18 PROCEDURE — 99233 SBSQ HOSP IP/OBS HIGH 50: CPT | Performed by: NURSE PRACTITIONER

## 2024-09-18 PROCEDURE — 83615 LACTATE (LD) (LDH) ENZYME: CPT

## 2024-09-18 PROCEDURE — 36415 COLL VENOUS BLD VENIPUNCTURE: CPT

## 2024-09-18 PROCEDURE — 76937 US GUIDE VASCULAR ACCESS: CPT | Performed by: RADIOLOGY

## 2024-09-18 PROCEDURE — 85025 COMPLETE CBC W/AUTO DIFF WBC: CPT

## 2024-09-18 PROCEDURE — 36556 INSERT NON-TUNNEL CV CATH: CPT | Performed by: RADIOLOGY

## 2024-09-18 PROCEDURE — 2500000004 HC RX 250 GENERAL PHARMACY W/ HCPCS (ALT 636 FOR OP/ED): Performed by: RADIOLOGY

## 2024-09-18 PROCEDURE — 83735 ASSAY OF MAGNESIUM: CPT | Performed by: NURSE PRACTITIONER

## 2024-09-18 PROCEDURE — 86922 COMPATIBILITY TEST ANTIGLOB: CPT

## 2024-09-18 PROCEDURE — 76705 ECHO EXAM OF ABDOMEN: CPT | Performed by: RADIOLOGY

## 2024-09-18 PROCEDURE — 1170000001 HC PRIVATE ONCOLOGY ROOM DAILY

## 2024-09-18 PROCEDURE — 7100000009 HC PHASE TWO TIME - INITIAL BASE CHARGE

## 2024-09-18 PROCEDURE — 2720000007 HC OR 272 NO HCPCS

## 2024-09-18 PROCEDURE — 86902 BLOOD TYPE ANTIGEN DONOR EA: CPT

## 2024-09-18 PROCEDURE — C1752 CATH,HEMODIALYSIS,SHORT-TERM: HCPCS

## 2024-09-18 PROCEDURE — 2500000002 HC RX 250 W HCPCS SELF ADMINISTERED DRUGS (ALT 637 FOR MEDICARE OP, ALT 636 FOR OP/ED)

## 2024-09-18 PROCEDURE — 80053 COMPREHEN METABOLIC PANEL: CPT

## 2024-09-18 PROCEDURE — 7100000010 HC PHASE TWO TIME - EACH INCREMENTAL 1 MINUTE

## 2024-09-18 PROCEDURE — 2780000003 HC OR 278 NO HCPCS

## 2024-09-18 RX ORDER — MIDAZOLAM HYDROCHLORIDE 1 MG/ML
INJECTION INTRAMUSCULAR; INTRAVENOUS
Status: COMPLETED | OUTPATIENT
Start: 2024-09-18 | End: 2024-09-18

## 2024-09-18 RX ORDER — ACETAMINOPHEN 325 MG/1
650 TABLET ORAL ONCE
Status: COMPLETED | OUTPATIENT
Start: 2024-09-18 | End: 2024-09-18

## 2024-09-18 RX ORDER — BUTALBITAL, ACETAMINOPHEN AND CAFFEINE 50; 325; 40 MG/1; MG/1; MG/1
1 TABLET ORAL EVERY 4 HOURS PRN
Status: DISCONTINUED | OUTPATIENT
Start: 2024-09-18 | End: 2024-09-28 | Stop reason: HOSPADM

## 2024-09-18 RX ORDER — METOCLOPRAMIDE HYDROCHLORIDE 5 MG/ML
10 INJECTION INTRAMUSCULAR; INTRAVENOUS ONCE
Status: COMPLETED | OUTPATIENT
Start: 2024-09-18 | End: 2024-09-21

## 2024-09-18 RX ORDER — FENTANYL CITRATE 50 UG/ML
INJECTION, SOLUTION INTRAMUSCULAR; INTRAVENOUS
Status: COMPLETED | OUTPATIENT
Start: 2024-09-18 | End: 2024-09-18

## 2024-09-18 RX ORDER — HYDROMORPHONE HYDROCHLORIDE 1 MG/ML
0.5 INJECTION, SOLUTION INTRAMUSCULAR; INTRAVENOUS; SUBCUTANEOUS ONCE
Status: COMPLETED | OUTPATIENT
Start: 2024-09-18 | End: 2024-09-18

## 2024-09-18 RX ORDER — KETOROLAC TROMETHAMINE 30 MG/ML
30 INJECTION, SOLUTION INTRAMUSCULAR; INTRAVENOUS ONCE
Status: DISCONTINUED | OUTPATIENT
Start: 2024-09-18 | End: 2024-09-22

## 2024-09-18 RX ORDER — POLYETHYLENE GLYCOL 3350 17 G/17G
17 POWDER, FOR SOLUTION ORAL ONCE
Status: COMPLETED | OUTPATIENT
Start: 2024-09-18 | End: 2024-09-18

## 2024-09-18 RX ORDER — OXYCODONE HYDROCHLORIDE 10 MG/1
20 TABLET ORAL EVERY 4 HOURS PRN
Status: DISCONTINUED | OUTPATIENT
Start: 2024-09-18 | End: 2024-09-19

## 2024-09-18 RX ADMIN — PSEUDOEPHEDRINE HCL 60 MG: 30 TABLET, FILM COATED ORAL at 23:15

## 2024-09-18 RX ADMIN — Medication 2 L/MIN: at 09:47

## 2024-09-18 RX ADMIN — PREDNISONE 5 MG: 10 TABLET ORAL at 08:24

## 2024-09-18 RX ADMIN — HYDROMORPHONE HYDROCHLORIDE 0.5 MG: 1 INJECTION, SOLUTION INTRAMUSCULAR; INTRAVENOUS; SUBCUTANEOUS at 03:58

## 2024-09-18 RX ADMIN — BUTALBITAL, ACETAMINOPHEN, AND CAFFEINE 1 TABLET: 325; 50; 40 TABLET ORAL at 09:45

## 2024-09-18 RX ADMIN — HYDROMORPHONE HYDROCHLORIDE 1 MG: 1 INJECTION, SOLUTION INTRAMUSCULAR; INTRAVENOUS; SUBCUTANEOUS at 03:44

## 2024-09-18 RX ADMIN — ACETAMINOPHEN 650 MG: 325 TABLET ORAL at 18:12

## 2024-09-18 RX ADMIN — HYDROMORPHONE HYDROCHLORIDE 2 MG: 2 INJECTION, SOLUTION INTRAMUSCULAR; INTRAVENOUS; SUBCUTANEOUS at 17:55

## 2024-09-18 RX ADMIN — DIPHENHYDRAMINE HYDROCHLORIDE 25 MG: 25 CAPSULE ORAL at 21:45

## 2024-09-18 RX ADMIN — PSEUDOEPHEDRINE HCL 60 MG: 30 TABLET, FILM COATED ORAL at 16:20

## 2024-09-18 RX ADMIN — BACLOFEN 5 MG: 10 TABLET ORAL at 08:24

## 2024-09-18 RX ADMIN — MIDAZOLAM HYDROCHLORIDE 1 MG: 1 INJECTION, SOLUTION INTRAMUSCULAR; INTRAVENOUS at 14:15

## 2024-09-18 RX ADMIN — HYDROMORPHONE HYDROCHLORIDE 2 MG: 2 INJECTION, SOLUTION INTRAMUSCULAR; INTRAVENOUS; SUBCUTANEOUS at 10:38

## 2024-09-18 RX ADMIN — KETOCONAZOLE 200 MG: 200 TABLET ORAL at 23:15

## 2024-09-18 RX ADMIN — HYDROMORPHONE HYDROCHLORIDE 2 MG: 2 INJECTION, SOLUTION INTRAMUSCULAR; INTRAVENOUS; SUBCUTANEOUS at 08:23

## 2024-09-18 RX ADMIN — BACLOFEN 5 MG: 10 TABLET ORAL at 20:20

## 2024-09-18 RX ADMIN — PANTOPRAZOLE SODIUM 20 MG: 20 TABLET, DELAYED RELEASE ORAL at 06:10

## 2024-09-18 RX ADMIN — HYDROMORPHONE HYDROCHLORIDE 5 MG: 4 TABLET ORAL at 23:13

## 2024-09-18 RX ADMIN — HYDROMORPHONE HYDROCHLORIDE 2 MG: 2 INJECTION, SOLUTION INTRAMUSCULAR; INTRAVENOUS; SUBCUTANEOUS at 12:47

## 2024-09-18 RX ADMIN — HYDROMORPHONE HYDROCHLORIDE 1 MG: 1 INJECTION, SOLUTION INTRAMUSCULAR; INTRAVENOUS; SUBCUTANEOUS at 06:10

## 2024-09-18 RX ADMIN — HYDROMORPHONE HYDROCHLORIDE 2 MG: 2 INJECTION, SOLUTION INTRAMUSCULAR; INTRAVENOUS; SUBCUTANEOUS at 20:20

## 2024-09-18 RX ADMIN — FENTANYL CITRATE 50 MCG: 50 INJECTION, SOLUTION INTRAMUSCULAR; INTRAVENOUS at 14:15

## 2024-09-18 RX ADMIN — BACLOFEN 5 MG: 10 TABLET ORAL at 16:20

## 2024-09-18 RX ADMIN — GABAPENTIN 100 MG: 100 CAPSULE ORAL at 06:10

## 2024-09-18 RX ADMIN — KETOCONAZOLE 200 MG: 200 TABLET ORAL at 09:45

## 2024-09-18 RX ADMIN — FENTANYL CITRATE 100 MCG: 50 INJECTION, SOLUTION INTRAMUSCULAR; INTRAVENOUS at 14:20

## 2024-09-18 RX ADMIN — Medication 2 L/MIN: at 20:21

## 2024-09-18 RX ADMIN — GABAPENTIN 100 MG: 100 CAPSULE ORAL at 16:20

## 2024-09-18 RX ADMIN — PSEUDOEPHEDRINE HCL 60 MG: 30 TABLET, FILM COATED ORAL at 08:24

## 2024-09-18 RX ADMIN — HYDROMORPHONE HYDROCHLORIDE 1 MG: 1 INJECTION, SOLUTION INTRAMUSCULAR; INTRAVENOUS; SUBCUTANEOUS at 01:44

## 2024-09-18 RX ADMIN — MIDAZOLAM HYDROCHLORIDE 2 MG: 1 INJECTION, SOLUTION INTRAMUSCULAR; INTRAVENOUS at 14:20

## 2024-09-18 RX ADMIN — SENNOSIDES AND DOCUSATE SODIUM 2 TABLET: 8.6; 5 TABLET ORAL at 08:24

## 2024-09-18 RX ADMIN — POLYETHYLENE GLYCOL 3350 17 G: 17 POWDER, FOR SOLUTION ORAL at 21:44

## 2024-09-18 RX ADMIN — FOLIC ACID 1 MG: 1 TABLET ORAL at 08:24

## 2024-09-18 RX ADMIN — GABAPENTIN 100 MG: 100 CAPSULE ORAL at 23:15

## 2024-09-18 ASSESSMENT — PAIN SCALES - GENERAL
PAINLEVEL_OUTOF10: 8
PAINLEVEL_OUTOF10: 6
PAINLEVEL_OUTOF10: 8
PAINLEVEL_OUTOF10: 8
PAINLEVEL_OUTOF10: 9
PAINLEVEL_OUTOF10: 8
PAINLEVEL_OUTOF10: 9
PAINLEVEL_OUTOF10: 6
PAINLEVEL_OUTOF10: 8
PAINLEVEL_OUTOF10: 8
PAINLEVEL_OUTOF10: 9
PAINLEVEL_OUTOF10: 8
PAINLEVEL_OUTOF10: 9
PAINLEVEL_OUTOF10: 8
PAINLEVEL_OUTOF10: 9
PAINLEVEL_OUTOF10: 8
PAINLEVEL_OUTOF10: 0 - NO PAIN
PAINLEVEL_OUTOF10: 9
PAINLEVEL_OUTOF10: 8
PAINLEVEL_OUTOF10: 8
PAINLEVEL_OUTOF10: 9

## 2024-09-18 ASSESSMENT — PAIN DESCRIPTION - LOCATION
LOCATION: GROIN
LOCATION: CHEST
LOCATION: CHEST

## 2024-09-18 ASSESSMENT — COGNITIVE AND FUNCTIONAL STATUS - GENERAL
DAILY ACTIVITIY SCORE: 24
MOBILITY SCORE: 24

## 2024-09-18 ASSESSMENT — ENCOUNTER SYMPTOMS
CHILLS: 0
HEADACHES: 0
ABDOMINAL DISTENTION: 0
SHORTNESS OF BREATH: 0
DIZZINESS: 0
FEVER: 0

## 2024-09-18 ASSESSMENT — PAIN SCALES - PAIN ASSESSMENT IN ADVANCED DEMENTIA (PAINAD): TOTALSCORE: MEDICATION (SEE MAR)

## 2024-09-18 NOTE — POST-PROCEDURE NOTE
Interventional Radiology Brief Postprocedure Note    Attending: Dr. Rakesh Pablo MD    Assistant: Vega Razo DO, PGY-3    Diagnosis: Sickle Cell Disease    Description of procedure: Successful placement of right internal jugular approach apheresis catheter. Please see dedicated procedure note on PACS for further details.      Anesthesia:  Local / fentanyl / versed    Complications: None    Estimated Blood Loss: none    Medications  As of 09/18/24 1436      HYDROmorphone (Dilaudid) injection 1 mg (mg) Total dose:  20 mg* Dosing weight:  69.4   *Administration not included in total     Date/Time Rate/Dose/Volume Action       09/12/24  2140 1 mg Given      2247 *1 mg Missed      2251 1 mg Given     09/13/24  0005 1 mg Given     09/16/24  1327 1 mg Given      1704 1 mg Given      2010 1 mg Given      2326 1 mg Given     09/17/24  0237 1 mg Given      0559 1 mg Given      0924 1 mg Given      1150 1 mg Given      1309 1 mg Given      1616 1 mg Given      1820 1 mg Given      1947 1 mg Given      2123 1 mg Given      2324 1 mg Given     09/18/24  0144 1 mg Given      0344 1 mg Given      0610 1 mg Given               HYDROmorphone (Dilaudid) injection 0.5 mg (mg) Total dose:  0.5 mg Dosing weight:  69.4      Date/Time Rate/Dose/Volume Action       09/18/24  0358 0.5 mg Given               ketorolac (Toradol) injection 15 mg (mg) Total dose:  15 mg Dosing weight:  69.4      Date/Time Rate/Dose/Volume Action       09/12/24 2142 15 mg Given               pseudoephedrine (Sudafed) tablet 60 mg (mg) Total dose:  60 mg Dosing weight:  69.4      Date/Time Rate/Dose/Volume Action       09/12/24 2142 60 mg Given               lactated Ringer's bolus 1,000 mL (mL/hr) Total volume:  Not documented* Dosing weight:  69.4   *Total volume has not been documented. View each administration to see the amount administered.     Date/Time Rate/Dose/Volume Action       09/12/24 2142 1,000 mL - 999 mL/hr (over 60 min) New Bag      09/13/24  0000  (over 60 min) Stopped               oxygen (O2) therapy (L/min) Total volume:  Not documented* Dosing weight:  69.4   *Total volume has not been documented. View each administration to see the amount administered.     Date/Time Rate/Dose/Volume Action       09/12/24 2015 2 L/min Start     09/13/24  0824 2 L/min Rate Verify Medical Gas     09/14/24  0858 2 L/min Start      2105 2 L/min Rate Verify Medical Gas     09/15/24  0851 21 percent Rate Verify Medical Gas      2027 2 L/min Rate Verify Medical Gas     09/16/24  0842 2 L/min Start      2010 2 L/min Start     09/17/24  0933 2 L/min Start      2121 2 L/min Start     09/18/24  0947 2 L/min Start               HYDROmorphone PF (Dilaudid) injection 2 mg (mg) Total dose:  58 mg Dosing weight:  69.4      Date/Time Rate/Dose/Volume Action       09/13/24  0148 2 mg Given      0405 2 mg Given      0609 2 mg Given      0829 2 mg Given      1046 2 mg Given      1428 2 mg Given      1723 2 mg Given      2112 2 mg Given      2118 2 mg Given     09/14/24  1038 2 mg Given      1539 2 mg Given      1929 2 mg Given      2206 2 mg Given     09/15/24  0003 2 mg Given      0304 2 mg Given      0611 2 mg Given      0842 2 mg Given      1049 2 mg Given      1546 2 mg Given      2021 2 mg Given     09/16/24  0109 2 mg Given      0254 2 mg Given      0501 2 mg Given      0707 2 mg Given      0922 2 mg Given      1125 2 mg Given     09/18/24  0823 2 mg Given      1038 2 mg Given      1247 2 mg Given               ketorolac (Toradol) injection 30 mg (mg) Total dose:  300 mg* Dosing weight:  69.4   *Administration not included in total     Date/Time Rate/Dose/Volume Action       09/13/24  0051 30 mg Given      0609 30 mg Given      1235 30 mg Given      1820 30 mg Given     09/14/24  0054 *30 mg Missed      0522 *30 mg Missed      1318 30 mg Given      1818 30 mg Given     09/15/24  0003 30 mg Given      0611 30 mg Given      1219 30 mg Given      1840 30 mg Given                baclofen (Lioresal) tablet 5 mg (mg) Total dose:  80 mg Dosing weight:  69.4      Date/Time Rate/Dose/Volume Action       09/13/24  0825 5 mg Given      1425 5 mg Given      2118 5 mg Given     09/14/24  0856 5 mg Given      1539 5 mg Given      2033 5 mg Given     09/15/24  0844 5 mg Given      1426 5 mg Given      2021 5 mg Given     09/16/24  0841 5 mg Given      1704 5 mg Given      2009 5 mg Given     09/17/24  0924 5 mg Given      1602 5 mg Given      2122 5 mg Given     09/18/24  0824 5 mg Given               flutamide (Eulexin) capsule 125 mg (mg) Total dose:  0 mg*   *Administration not included in total     Date/Time Rate/Dose/Volume Action       09/13/24  0836 *125 mg Missed      1456 *125 mg Missed      2113 *125 mg Missed     09/14/24  0858 *125 mg Missed      1425 *125 mg Missed      2024 *125 mg Missed     09/15/24  0841 *125 mg Missed      1410 *125 mg Missed      2016 *125 mg Missed     09/16/24  0842 *125 mg Missed      1705 *125 mg Missed      2005 *125 mg Missed     09/17/24  0933 *125 mg Missed      1556 *125 mg Missed      2323 *125 mg Missed     09/18/24  0817 *125 mg Missed               folic acid (Folvite) tablet 1 mg (mg) Total dose:  6 mg Dosing weight:  69.4      Date/Time Rate/Dose/Volume Action       09/13/24  0825 1 mg Given     09/14/24  0856 1 mg Given     09/15/24  0843 1 mg Given     09/16/24  0841 1 mg Given     09/17/24  0924 1 mg Given     09/18/24  0824 1 mg Given               gabapentin (Neurontin) capsule 100 mg (mg) Total dose:  1,600 mg* Dosing weight:  69.4   *Administration not included in total     Date/Time Rate/Dose/Volume Action       09/13/24  0049 100 mg Given      0609 100 mg Given      1425 100 mg Given     09/14/24  0054 *100 mg Missed      0532 100 mg Given      1318 100 mg Given      2034 100 mg Given     09/15/24  0610 100 mg Given      1426 100 mg Given      2021 100 mg Given     09/16/24  0501 100 mg Given      1327 100 mg Given      2214 100 mg Given      09/17/24  0559 100 mg Given      1602 100 mg Given      2123 100 mg Given     09/18/24  0610 100 mg Given               pantoprazole (ProtoNix) EC tablet 20 mg (mg) Total dose:  120 mg Dosing weight:  69.4      Date/Time Rate/Dose/Volume Action       09/13/24  0609 20 mg Given     09/14/24  0532 20 mg Given     09/15/24  0610 20 mg Given     09/16/24  0501 20 mg Given     09/17/24  0928 20 mg Given     09/18/24  0610 20 mg Given               ketoconazole (NIZOral) tablet 200 mg (mg) Total dose:  1,800 mg* Dosing weight:  69.4   *Administration not included in total     Date/Time Rate/Dose/Volume Action       09/13/24  1433 *200 mg Missed     09/14/24  0054 *200 mg Missed      1317 200 mg Given      2034 200 mg Given     09/15/24  0845 200 mg Given      2021 200 mg Given     09/16/24  0841 200 mg Given      2009 200 mg Given     09/17/24 0924 200 mg Given      2324 200 mg Given     09/18/24  0945 200 mg Given               predniSONE (Deltasone) tablet 5 mg (mg) Total dose:  30 mg Dosing weight:  69.4      Date/Time Rate/Dose/Volume Action       09/13/24  1235 5 mg Given     09/14/24  0856 5 mg Given     09/15/24  0843 5 mg Given     09/16/24  0841 5 mg Given     09/17/24  0924 5 mg Given     09/18/24  0824 5 mg Given               HYDROmorphone (Dilaudid) tablet 2 mg (mg) Total dose:  2 mg Dosing weight:  69.4      Date/Time Rate/Dose/Volume Action       09/14/24  0532 2 mg Given               pseudoephedrine (Sudafed) tablet 60 mg (mg) Total dose:  720 mg* Dosing weight:  69.4   *Administration not included in total     Date/Time Rate/Dose/Volume Action       09/14/24  0856 60 mg Given      1539 60 mg Given     09/15/24  0003 60 mg Given      0844 60 mg Given      1546 60 mg Given      2259 60 mg Given     09/16/24  0841 60 mg Given      1704 60 mg Given     09/17/24  0035 60 mg Given      0924 60 mg Given      1820 60 mg Given      2325 *60 mg Missed     09/18/24  0824 60 mg Given               polyethylene  glycol (Glycolax, Miralax) packet 17 g (g) Total dose:  0 g* Dosing weight:  69.4   *Administration not included in total     Date/Time Rate/Dose/Volume Action       09/16/24  1328 *17 g Missed     09/17/24 0923 *17 g Missed     09/18/24  0841 *17 g Missed               sennosides-docusate sodium (Promise-Colace) 8.6-50 mg per tablet 2 tablet (tablet) Total dose:  10 tablet Dosing weight:  69.4      Date/Time Rate/Dose/Volume Action       09/16/24 0841 2 tablet Given      2009 2 tablet Given     09/17/24 0924 2 tablet Given      2123 2 tablet Given     09/18/24 0824 2 tablet Given               butalbital-acetaminophen-caff -40 mg per tablet 1 tablet (tablet) Total dose:  1 tablet Dosing weight:  69.4      Date/Time Rate/Dose/Volume Action       09/18/24  0945 1 tablet Given               fentaNYL PF (Sublimaze) injection (mcg) Total dose:  150 mcg      Date/Time Rate/Dose/Volume Action       09/18/24  1415 50 mcg Given      1420 100 mcg Given               midazolam (Versed) injection (mg) Total dose:  3 mg      Date/Time Rate/Dose/Volume Action       09/18/24  1415 1 mg Given      1420 2 mg Given                   No specimens collected      See detailed result report with images in PACS.    The patient tolerated the procedure well without incident or complication and is in stable condition.

## 2024-09-18 NOTE — PROGRESS NOTES
"Joellen Narayan is a 23 y.o. male on day 5 of admission presenting with Sickle cell crisis (Multi).    Subjective   Seen this AM at the bedside. Pt reports that over night he started having significant groin pain again. No issues with worsening priapism. Penis still bendable. We discussed plan to increase pain meds today, however that if pain is not improving we need to strongly reconsider RBCEx. Pt expressed understanding. He denies any fevers/chills, SOB, or CP.    Objective     Physical Exam  Vitals reviewed.   Constitutional:       Appearance: Normal appearance.   HENT:      Head: Normocephalic and atraumatic.      Nose: Nose normal.      Mouth/Throat:      Mouth: Mucous membranes are moist.      Pharynx: Oropharynx is clear.   Eyes:      Extraocular Movements: Extraocular movements intact.      Pupils: Pupils are equal, round, and reactive to light.   Cardiovascular:      Rate and Rhythm: Normal rate and regular rhythm.      Pulses: Normal pulses.      Heart sounds: Normal heart sounds.   Pulmonary:      Effort: Pulmonary effort is normal.      Breath sounds: Normal breath sounds.   Abdominal:      General: Bowel sounds are normal.      Palpations: Abdomen is soft.   Musculoskeletal:         General: Normal range of motion.   Skin:     General: Skin is warm.   Neurological:      General: No focal deficit present.      Mental Status: He is alert and oriented to person, place, and time. Mental status is at baseline.   Psychiatric:         Mood and Affect: Mood normal.         Behavior: Behavior normal.       Last Recorded Vitals  Blood pressure 110/58, pulse 79, temperature 36.9 °C (98.4 °F), temperature source Temporal, resp. rate 16, height 1.88 m (6' 2\"), weight 69.4 kg (153 lb), SpO2 95%.         No results found for this or any previous visit (from the past 24 hour(s)).    Scheduled medications  baclofen, 5 mg, oral, TID  flutamide, 125 mg, oral, TID  folic acid, 1 mg, oral, Daily  gabapentin, 100 mg, oral, " q8h REYNALDO  ketoconazole, 200 mg, oral, BID  oxygen, , inhalation, Continuous - Inhalation  pantoprazole, 20 mg, oral, Daily before breakfast  polyethylene glycol, 17 g, oral, Daily  predniSONE, 5 mg, oral, Daily  pseudoephedrine, 60 mg, oral, q8h  sennosides-docusate sodium, 2 tablet, oral, BID      Continuous medications     PRN medications  PRN medications: butalbital-acetaminophen-caff, diphenhydrAMINE, HYDROmorphone, ondansetron ODT **OR** [DISCONTINUED] ondansetron, oxygen      Assessment/Plan   Assessment & Plan  Sickle cell crisis (Multi)    Joellen Narayan is a 23 y.o. 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 CXR (9/12) negative for acute cardiopulmonary process. Urology following patient for priapism, upon presentation, patient did not agree to bedside drainage and corporal blood gas. 9/13 started on Ketoconazole 200mg BID and prednisone 5mg daily per urology recs. Penile doppler US ordered 9/15 per urology, showing normal caliber and peak systolic velocities of b/l cavernosal arteries which have improved since prior. Hematology consulted on 9/13 give persistent priapism x1 month, recommend emergent RBCex. 9/13 plan for apheresis line placement in the ED followed by emergent RBCex, however, patient declined placement of line in ED on 9/13, plan for apheresis line placement with IR followed by RBCex on 9/16. On 9/16 he then again declined apheresis line placement and RBCEx. Pain was stable x2 days until again 9/18 pt endorsed significant groin pain, penis  still bendable. Discussed with urology, pt to be re-evaluated. Tbili up to 8.0 (9/18)- RUQ US pending. Pt now amenable to RBCEx, IR consulted and TM to exchange pt either today or tomorrow 9/19. For pain, patient continues on IV dilaudid, added oxycodone for additional relief 9/19. Discharge pending improvement in pain, stability of priapism, improvement in LFTs, and RBCEx.     # Priapism  - Presented to the ED with worsening priapism since 5pm (hadn't resolved from last admission)  - s/p recent admission 8/31-9/9, OR with urology for priapism drainage, penile block, and corpora cavernosa irrigation  - Also, s/p scheduled Sudafed q8h and added ibuprofen, baclofen 5mg TID and gabapentin 100mg TID  - Penile doppler US while prior inpatient (9/1) showing small caliber of bilateral cavernosal arteries with lack of color flow in portions of both arteries, more on the right, c/w low-flow/ischemic priapism. Urology notified, s/p phenylephrine injection with urology 9/1 evening with partial resolution of priapism  - Reassessed by Urology 9/7/24 reevaluate penile appearance; firm but still bendable with no increased pain. Rec to continue management as prior and to maintain outpatient appointment on 9/10/24.  - Outpatient appointment 9/10 prescribed Flutamide 125mg TID but per parent, no pharmacy has this medication so it was not started  - Doppler U/S completed outpatient 9/10 noted arterial flow  - Urology following rec bedside drainage and corporal blood gas and phenylepinephine injection --patient refusing; also rec starting treatment with ketoconazole with prednisone, 9/15 rec penile doppler US    - Penile doppler US (9/15) per urology, showing normal caliber and peak systolic velocities of b/l cavernosal arteries which have improved since prior.   - Heme following and rec emergent RBCex in setting of prolonged priapism  - S/p Sudafed 60mg x1 in ED  - Continue home Sudafed q8h prn and added ibuprofen, Baclofen 5mg TID  and Gabapentin 100mg TID  - 9/13 started on ketoconazole 200mg BID and Prednisone 5mg daily-per urology recs pt to be dc'd with this  - C. Trachomatis/N. Gonorrhoeae (9/14): negative   - Initially planned for aphesis line placement followed by RBCex on 9/13, however, patient declined placement of apheresis line in the ED, primary, hematology and urology team explained risks to patient and he continued to decline   - Planed for apheresis line placement in IR on 9/16 followed by RBCex - hematology, Dr. Cruz, and trransfusion med team aware--> however on 9/16 pt declined apheresis line placement and RBCEx again  - 9/16 pt was educated on risks of refusing/delaying medical care and recurrent priapism  - Pain was stable x2 days until again 9/18 pt endorsed significant groin pain, penis still bendable. Discussed with urology, pt to be re-evaluated. Tbili up to 8.0 (9/18)- RUQ US pending. Pt now amenable to RBCEx, IR consulted and TM to exchange pt either today or tomorrow 9/19.      # Hgb SS with severe pain  - OARRS reviewed, no aberrancies  - No current care path  - Hgb baseline ~8.5; currently stable, no indication for simple transfusion   - Tbili baseline fluctuates ~5-1; Tbili 4.4 on 9/16, up to 6.0 on 9/17, labs pending 9/18  - LDH baseline ~500   on 9/17, labs pending 9/18  - s/p IV Dilaudid 2mg q2hrs PRN severe pain (9/12-9/16)  - 9/16 weaned pain meds to IV dilaudid 1mg q3hrs PRN severe pain  - 9/18 increased IV dilaudid to 2mg q2hrs PRN severe pain I/s/o significant groin pain and added oxycodone 20mg q4hrs PRN breakthrough pain  - Added fioricet 1tab q4hrs PRN HA (9/18)  - Continue folic acid 1mg daily  - Hgb S (9/12): 59.8%; repeat Hgb S (9/18): pending  - Heme following (see above)  - PO Zofran PRN for opioid-induced nausea, PO Benadryl PRN for opioid-induced pruritus, Bowel regimen for opioid-induced constipation with DocuSenna 2tabs BID and Miralax daily      # Nodular lymphocyte predominant Hodgkins  lymphoma (NLPHL)   - Primary Oncologist: Dr. Stoll  - Enlarged lymph nodes noted 4/1/22  - RUQ US (11/14/22) with mildly enlarged LNs in the region of the kavin hepatis  - MRI liver (11/16/22) showed re-demonstration of bulky retroperitoneal lymphadenopathy and kavin hepatic lymphadenopathy    - (11/18/22) lymph node biopsy showed atypical lymphoid infiltrate. Reviewed by Hemepath board, insufficient for lymphoma diagnosis  - PET/CT (12/6/22) showing retroperitoneal lymphadenopathy  - Followed up with Dr. Stoll (12/16/22) with plan for surg/onc consult for large tissue bx but patient missed apt and was lost to follow up  - Requested new apt with Dr. Ronnie Marte 6/19/23, patient was not seen and lost to follow up  - CT a/p (11/28/23) increased size of retroperitoneal lymph nodes reflecting extramedullary hematopoiesis I/s/o sickle cell vs lymphoma  - Paraaortic LN bx via IR (11/30/23) consistent with NLPHL. Flow: no clonal B cell or T cell population identified, lymphocyte 95%, CD3+CD4+ 68%, CD3+CD8+ 7%, CD19+ 24%  - Elevated LDH/bili partially from sickle cell disease   - Chemotherapy (R-CHOP) was discussed with primary oncologist Dr. Stoll, and decision was made to simplify his chemotherapy to Rituxan and prednisone q3 weeks mainly due to frequent sickle cell crisis  - Current chemo regimen: Rituxan and prednisone q3 weeks (C1 1/18/24, C2 2/8/24, Missed C3 d/t sickle cell pain crisis, C4 4/4/24, C5 5/16/24, C6 6/7/24)  - Per pt no longer taking Acyclovir 400mg BID prophy   - PET CT (7/25) overall showing great response to treatment with persistent residual viable disease involving a left common iliac node  - Last FUV with Dr. Stoll 7/25/24 rec RTC in 2 months (next FUV scheduled for 9/19)     # Choledocholithiasis  - s/p ERCP 7/18/24 with a biliary sphincterotomy where sludge and stones were removed, achieving complete clearance  - Seen by gen surg 8/8/24 Dr. Dove who rec lap cholecystectomy d/t the chance  "of recurrence of choledocholithiasis  - Surgery has yet to be scheduled, has FUV with gen surg again 9/30  - Tbili markedly improved, recent peak at 52.8 prior to ERCP during recent hospital admission  - Tbili 8.0 (9/18); RUQ US ordered/pending (9/18)--> if any c/f CBD dilation or fevers, plan to start Zosyn     # Hx of nocturnal oxygen dependence and hypoxia on room air  - Has not had home oxygen for 2-3 years   - Wean as able, encourage incentive spirometry  - Pulm FUV 8/8- \"Given his history of sickle cell disease, it is important to ensure he has formal sleep testing to look at desaturations and presence of sleep apnea despite not having a convincing history/body habitus typical for suspecting sleep apnea- patients with sickle cell have a higher prevalence of sleep disorders including nocturnal hypoxemia\"--> rec sleep referral for formal testing if deemed appropriate, ABG and O2 destat testing, and TTE with bubble study  - TTE 8/23 showing EF 60-65%, severely dilated LV and LA, + intrapulmonary shunting       DVT prophy: Lovenox subcutaneous, SCDs, encourage ambulation     DISPO:  - Full code, confirmed on admit  - Discharge pending improvement in pain, stability of priapism, improvement in LFTs, and RBCEx  - SUSIE Narayan (Parent) [phone]  - Sickle cell FUV 9/18 (requested reschedule), Dr. Stoll FUV 9/19 (requested reschedule), Gen Surg FUV 9/30, Urology FUV 10/7    I spent >60 minutes in the professional and overall care of this patient    Assessment and plan discussed with attending physician Dr. Deanna Jenkins, APRN-CNP  "

## 2024-09-18 NOTE — PRE-PROCEDURE NOTE
Interventional Radiology Preprocedure Note    Indication for procedure: The primary encounter diagnosis was Sickle cell crisis (Multi). 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    Relevant review of systems: Review of Systems   Constitutional:  Negative for chills and fever.   Respiratory:  Negative for shortness of breath.    Cardiovascular:  Negative for chest pain.   Gastrointestinal:  Negative for abdominal distention.   Skin: Negative.    Neurological:  Negative for dizziness, syncope and headaches.        Relevant Labs:   Lab Results   Component Value Date    CREATININE 0.69 09/18/2024    EGFR >90 09/18/2024    INR 1.3 (H) 09/15/2024    PROTIME 14.2 (H) 09/15/2024       Planned Sedation/Anesthesia: Moderate    Airway assessment: normal    Directed physical examination:    Physical Exam  Constitutional:       General: He is not in acute distress.  Cardiovascular:      Rate and Rhythm: Normal rate.      Pulses: Normal pulses.   Pulmonary:      Effort: Pulmonary effort is normal.   Skin:     General: Skin is warm and dry.   Neurological:      Mental Status: He is oriented to person, place, and time. Mental status is at baseline.          Mallampati: II (hard and soft palate, upper portion of tonsils and uvula visible)    ASA Score: ASA 2 - Patient with mild systemic disease with no functional limitations    Benefits, risks and alternatives of procedure and planned sedation have been discussed with the patient and/or their representative. All questions answered and they agree to proceed.

## 2024-09-18 NOTE — CARE PLAN
The patient's goals for the shift include      The clinical goals for the shift include [facility]      Problem: Pain  Goal: Takes deep breaths with improved pain control throughout the shift  Outcome: Not Progressing  Goal: Turns in bed with improved pain control throughout the shift  Outcome: Not Progressing  Goal: Walks with improved pain control throughout the shift  Outcome: Not Progressing  Goal: Performs ADL's with improved pain control throughout shift  Outcome: Not Progressing  Goal: Participates in PT with improved pain control throughout the shift  Outcome: Not Progressing  Goal: Free from opioid side effects throughout the shift  Outcome: Not Progressing  Goal: Free from acute confusion related to pain meds throughout the shift  Outcome: Not Progressing     Problem: Pain - Adult  Goal: Verbalizes/displays adequate comfort level or baseline comfort level  Outcome: Not Progressing     Problem: Safety - Adult  Goal: Free from fall injury  Outcome: Not Progressing     Problem: Discharge Planning  Goal: Discharge to home or other facility with appropriate resources  Outcome: Not Progressing     Problem: Chronic Conditions and Co-morbidities  Goal: Patient's chronic conditions and co-morbidity symptoms are monitored and maintained or improved  Outcome: Not Progressing     Problem: Fall/Injury  Goal: Not fall by end of shift  Outcome: Not Progressing  Goal: Be free from injury by end of the shift  Outcome: Not Progressing  Goal: Verbalize understanding of personal risk factors for fall in the hospital  Outcome: Not Progressing  Goal: Verbalize understanding of risk factor reduction measures to prevent injury from fall in the home  Outcome: Not Progressing  Goal: Use assistive devices by end of the shift  Outcome: Not Progressing  Goal: Pace activities to prevent fatigue by end of the shift  Outcome: Not Progressing

## 2024-09-19 ENCOUNTER — APPOINTMENT (OUTPATIENT)
Dept: OTHER | Facility: HOSPITAL | Age: 24
DRG: 812 | End: 2024-09-19
Payer: COMMERCIAL

## 2024-09-19 ENCOUNTER — APPOINTMENT (OUTPATIENT)
Dept: HEMATOLOGY/ONCOLOGY | Facility: HOSPITAL | Age: 24
End: 2024-09-19
Payer: COMMERCIAL

## 2024-09-19 DIAGNOSIS — C81.03 NODULAR LYMPHOCYTE PREDOMINANT HODGKIN LYMPHOMA OF INTRA-ABDOMINAL LYMPH NODES (MULTI): ICD-10-CM

## 2024-09-19 LAB
ALBUMIN SERPL BCP-MCNC: 4.8 G/DL (ref 3.4–5)
ALP SERPL-CCNC: 161 U/L (ref 33–120)
ALT SERPL W P-5'-P-CCNC: 30 U/L (ref 10–52)
ANION GAP SERPL CALC-SCNC: 15 MMOL/L (ref 10–20)
AST SERPL W P-5'-P-CCNC: 44 U/L (ref 9–39)
BASOPHILS # BLD AUTO: 0.08 X10*3/UL (ref 0–0.1)
BASOPHILS NFR BLD AUTO: 0.4 %
BILIRUB SERPL-MCNC: 11.1 MG/DL (ref 0–1.2)
BLOOD EXPIRATION DATE: NORMAL
BUN SERPL-MCNC: 9 MG/DL (ref 6–23)
CA-I BLD-SCNC: 1.14 MMOL/L (ref 1.1–1.33)
CALCIUM SERPL-MCNC: 10 MG/DL (ref 8.6–10.6)
CHLORIDE SERPL-SCNC: 98 MMOL/L (ref 98–107)
CO2 SERPL-SCNC: 26 MMOL/L (ref 21–32)
CREAT SERPL-MCNC: 0.72 MG/DL (ref 0.5–1.3)
DISPENSE STATUS: NORMAL
EGFRCR SERPLBLD CKD-EPI 2021: >90 ML/MIN/1.73M*2
EOSINOPHIL # BLD AUTO: 0.2 X10*3/UL (ref 0–0.7)
EOSINOPHIL NFR BLD AUTO: 1.1 %
ERYTHROCYTE [DISTWIDTH] IN BLOOD BY AUTOMATED COUNT: 17.2 % (ref 11.5–14.5)
ERYTHROCYTE [DISTWIDTH] IN BLOOD BY AUTOMATED COUNT: 19.9 % (ref 11.5–14.5)
GLUCOSE SERPL-MCNC: 79 MG/DL (ref 74–99)
HCT VFR BLD AUTO: 23.9 % (ref 41–52)
HCT VFR BLD AUTO: 29.7 % (ref 41–52)
HGB BLD-MCNC: 10.2 G/DL (ref 13.5–17.5)
HGB BLD-MCNC: 8.3 G/DL (ref 13.5–17.5)
HGB RETIC QN: 31 PG (ref 28–38)
IMM GRANULOCYTES # BLD AUTO: 0.41 X10*3/UL (ref 0–0.7)
IMM GRANULOCYTES NFR BLD AUTO: 2.2 % (ref 0–0.9)
IMMATURE RETIC FRACTION: 25.2 %
LDH SERPL L TO P-CCNC: 436 U/L (ref 84–246)
LYMPHOCYTES # BLD AUTO: 1.49 X10*3/UL (ref 1.2–4.8)
LYMPHOCYTES NFR BLD AUTO: 8 %
MAGNESIUM SERPL-MCNC: 1.91 MG/DL (ref 1.6–2.4)
MCH RBC QN AUTO: 28.1 PG (ref 26–34)
MCH RBC QN AUTO: 29.7 PG (ref 26–34)
MCHC RBC AUTO-ENTMCNC: 34.3 G/DL (ref 32–36)
MCHC RBC AUTO-ENTMCNC: 34.7 G/DL (ref 32–36)
MCV RBC AUTO: 82 FL (ref 80–100)
MCV RBC AUTO: 86 FL (ref 80–100)
MONOCYTES # BLD AUTO: 2.5 X10*3/UL (ref 0.1–1)
MONOCYTES NFR BLD AUTO: 13.4 %
NEUTROPHILS # BLD AUTO: 13.91 X10*3/UL (ref 1.2–7.7)
NEUTROPHILS NFR BLD AUTO: 74.9 %
NRBC BLD-RTO: 1.2 /100 WBCS (ref 0–0)
NRBC BLD-RTO: 1.3 /100 WBCS (ref 0–0)
PLATELET # BLD AUTO: 252 X10*3/UL (ref 150–450)
PLATELET # BLD AUTO: 442 X10*3/UL (ref 150–450)
POTASSIUM SERPL-SCNC: 3.7 MMOL/L (ref 3.5–5.3)
PRODUCT BLOOD TYPE: 1700
PRODUCT BLOOD TYPE: 5100
PRODUCT BLOOD TYPE: 9500
PRODUCT CODE: NORMAL
PROT SERPL-MCNC: 7.5 G/DL (ref 6.4–8.2)
RBC # BLD AUTO: 2.79 X10*6/UL (ref 4.5–5.9)
RBC # BLD AUTO: 3.63 X10*6/UL (ref 4.5–5.9)
RETICS #: 0.51 X10*6/UL (ref 0.02–0.12)
RETICS/RBC NFR AUTO: 18.4 % (ref 0.5–2)
SODIUM SERPL-SCNC: 135 MMOL/L (ref 136–145)
UNIT ABO: NORMAL
UNIT NUMBER: NORMAL
UNIT RH: NORMAL
UNIT VOLUME: 279
UNIT VOLUME: 279
UNIT VOLUME: 286
UNIT VOLUME: 286
UNIT VOLUME: 318
UNIT VOLUME: 318
UNIT VOLUME: 350
WBC # BLD AUTO: 11.6 X10*3/UL (ref 4.4–11.3)
WBC # BLD AUTO: 18.6 X10*3/UL (ref 4.4–11.3)
XM INTEP: NORMAL

## 2024-09-19 PROCEDURE — 2500000005 HC RX 250 GENERAL PHARMACY W/O HCPCS: Performed by: PHYSICIAN ASSISTANT

## 2024-09-19 PROCEDURE — 36416 COLLJ CAPILLARY BLOOD SPEC: CPT

## 2024-09-19 PROCEDURE — 2500000005 HC RX 250 GENERAL PHARMACY W/O HCPCS

## 2024-09-19 PROCEDURE — 1170000001 HC PRIVATE ONCOLOGY ROOM DAILY

## 2024-09-19 PROCEDURE — P9040 RBC LEUKOREDUCED IRRADIATED: HCPCS

## 2024-09-19 PROCEDURE — 99233 SBSQ HOSP IP/OBS HIGH 50: CPT

## 2024-09-19 PROCEDURE — 83615 LACTATE (LD) (LDH) ENZYME: CPT

## 2024-09-19 PROCEDURE — 2500000001 HC RX 250 WO HCPCS SELF ADMINISTERED DRUGS (ALT 637 FOR MEDICARE OP)

## 2024-09-19 PROCEDURE — 84075 ASSAY ALKALINE PHOSPHATASE: CPT

## 2024-09-19 PROCEDURE — 85045 AUTOMATED RETICULOCYTE COUNT: CPT

## 2024-09-19 PROCEDURE — 83021 HEMOGLOBIN CHROMOTOGRAPHY: CPT

## 2024-09-19 PROCEDURE — 36512 APHERESIS RBC: CPT | Performed by: PATHOLOGY

## 2024-09-19 PROCEDURE — 82330 ASSAY OF CALCIUM: CPT

## 2024-09-19 PROCEDURE — 2500000004 HC RX 250 GENERAL PHARMACY W/ HCPCS (ALT 636 FOR OP/ED)

## 2024-09-19 PROCEDURE — 85027 COMPLETE CBC AUTOMATED: CPT

## 2024-09-19 PROCEDURE — 83735 ASSAY OF MAGNESIUM: CPT | Performed by: NURSE PRACTITIONER

## 2024-09-19 PROCEDURE — 83020 HEMOGLOBIN ELECTROPHORESIS: CPT

## 2024-09-19 PROCEDURE — 2500000002 HC RX 250 W HCPCS SELF ADMINISTERED DRUGS (ALT 637 FOR MEDICARE OP, ALT 636 FOR OP/ED)

## 2024-09-19 PROCEDURE — 85025 COMPLETE CBC W/AUTO DIFF WBC: CPT

## 2024-09-19 PROCEDURE — 2500000001 HC RX 250 WO HCPCS SELF ADMINISTERED DRUGS (ALT 637 FOR MEDICARE OP): Performed by: NURSE PRACTITIONER

## 2024-09-19 PROCEDURE — 36512 APHERESIS RBC: CPT

## 2024-09-19 RX ORDER — HEPARIN SODIUM 1000 [USP'U]/ML
1000 INJECTION, SOLUTION INTRAVENOUS; SUBCUTANEOUS ONCE
Status: COMPLETED | OUTPATIENT
Start: 2024-09-19 | End: 2024-09-19

## 2024-09-19 RX ORDER — DIPHENHYDRAMINE HCL 50 MG
50 CAPSULE ORAL ONCE
Status: COMPLETED | OUTPATIENT
Start: 2024-09-19 | End: 2024-09-19

## 2024-09-19 RX ORDER — DIPHENHYDRAMINE HYDROCHLORIDE 50 MG/ML
25 INJECTION INTRAMUSCULAR; INTRAVENOUS EVERY 5 MIN PRN
Status: DISCONTINUED | OUTPATIENT
Start: 2024-09-19 | End: 2024-09-19 | Stop reason: HOSPADM

## 2024-09-19 RX ORDER — CALCIUM CARBONATE 200(500)MG
1500 TABLET,CHEWABLE ORAL EVERY 5 MIN PRN
Status: DISCONTINUED | OUTPATIENT
Start: 2024-09-19 | End: 2024-09-19 | Stop reason: HOSPADM

## 2024-09-19 RX ORDER — HYDROMORPHONE HYDROCHLORIDE 1 MG/ML
1 INJECTION, SOLUTION INTRAMUSCULAR; INTRAVENOUS; SUBCUTANEOUS
Status: DISCONTINUED | OUTPATIENT
Start: 2024-09-19 | End: 2024-09-19

## 2024-09-19 RX ORDER — OXYCODONE HYDROCHLORIDE 5 MG/1
15 TABLET ORAL EVERY 4 HOURS PRN
Status: DISCONTINUED | OUTPATIENT
Start: 2024-09-19 | End: 2024-09-19

## 2024-09-19 RX ORDER — SUMATRIPTAN 50 MG/1
50 TABLET, FILM COATED ORAL ONCE
Status: COMPLETED | OUTPATIENT
Start: 2024-09-19 | End: 2024-09-19

## 2024-09-19 RX ORDER — OXYCODONE HYDROCHLORIDE 10 MG/1
20 TABLET ORAL EVERY 4 HOURS PRN
Status: DISCONTINUED | OUTPATIENT
Start: 2024-09-19 | End: 2024-09-19

## 2024-09-19 RX ORDER — HYDROMORPHONE HYDROCHLORIDE 1 MG/ML
1 INJECTION, SOLUTION INTRAMUSCULAR; INTRAVENOUS; SUBCUTANEOUS EVERY 2 HOUR PRN
Status: DISCONTINUED | OUTPATIENT
Start: 2024-09-19 | End: 2024-09-20

## 2024-09-19 RX ORDER — ACETAMINOPHEN 325 MG/1
650 TABLET ORAL ONCE
Status: COMPLETED | OUTPATIENT
Start: 2024-09-19 | End: 2024-09-19

## 2024-09-19 RX ADMIN — KETOCONAZOLE 200 MG: 200 TABLET ORAL at 15:19

## 2024-09-19 RX ADMIN — OXYCODONE HYDROCHLORIDE 20 MG: 10 TABLET ORAL at 08:58

## 2024-09-19 RX ADMIN — Medication 2 L/MIN: at 20:12

## 2024-09-19 RX ADMIN — BACLOFEN 5 MG: 10 TABLET ORAL at 08:59

## 2024-09-19 RX ADMIN — Medication 2 L/MIN: at 08:59

## 2024-09-19 RX ADMIN — HEPARIN SODIUM 1000 UNITS: 1000 INJECTION INTRAVENOUS; SUBCUTANEOUS at 11:50

## 2024-09-19 RX ADMIN — PSEUDOEPHEDRINE HCL 60 MG: 30 TABLET, FILM COATED ORAL at 15:19

## 2024-09-19 RX ADMIN — PANTOPRAZOLE SODIUM 20 MG: 20 TABLET, DELAYED RELEASE ORAL at 06:00

## 2024-09-19 RX ADMIN — PSEUDOEPHEDRINE HCL 60 MG: 30 TABLET, FILM COATED ORAL at 08:58

## 2024-09-19 RX ADMIN — SUMATRIPTAN SUCCINATE 50 MG: 50 TABLET ORAL at 05:59

## 2024-09-19 RX ADMIN — BACLOFEN 5 MG: 10 TABLET ORAL at 20:11

## 2024-09-19 RX ADMIN — ACETAMINOPHEN 650 MG: 325 TABLET ORAL at 09:53

## 2024-09-19 RX ADMIN — HYDROMORPHONE HYDROCHLORIDE 1 MG: 1 INJECTION, SOLUTION INTRAMUSCULAR; INTRAVENOUS; SUBCUTANEOUS at 20:10

## 2024-09-19 RX ADMIN — DIPHENHYDRAMINE HYDROCHLORIDE 50 MG: 50 CAPSULE ORAL at 09:54

## 2024-09-19 RX ADMIN — FOLIC ACID 1 MG: 1 TABLET ORAL at 08:58

## 2024-09-19 RX ADMIN — OXYCODONE HYDROCHLORIDE 20 MG: 10 TABLET ORAL at 12:39

## 2024-09-19 RX ADMIN — BACLOFEN 5 MG: 10 TABLET ORAL at 15:19

## 2024-09-19 RX ADMIN — SENNOSIDES AND DOCUSATE SODIUM 2 TABLET: 8.6; 5 TABLET ORAL at 20:11

## 2024-09-19 RX ADMIN — HEPARIN SODIUM 1000 UNITS: 1000 INJECTION INTRAVENOUS; SUBCUTANEOUS at 11:52

## 2024-09-19 RX ADMIN — HYDROMORPHONE HYDROCHLORIDE 5 MG: 4 TABLET ORAL at 03:51

## 2024-09-19 RX ADMIN — ONDANSETRON 8 MG: 8 TABLET, ORALLY DISINTEGRATING ORAL at 20:16

## 2024-09-19 RX ADMIN — OXYCODONE HYDROCHLORIDE 20 MG: 10 TABLET ORAL at 15:19

## 2024-09-19 RX ADMIN — HYDROMORPHONE HYDROCHLORIDE 1 MG: 1 INJECTION, SOLUTION INTRAMUSCULAR; INTRAVENOUS; SUBCUTANEOUS at 22:30

## 2024-09-19 RX ADMIN — PREDNISONE 5 MG: 10 TABLET ORAL at 08:59

## 2024-09-19 RX ADMIN — GABAPENTIN 100 MG: 100 CAPSULE ORAL at 08:59

## 2024-09-19 RX ADMIN — GABAPENTIN 100 MG: 100 CAPSULE ORAL at 15:19

## 2024-09-19 ASSESSMENT — COGNITIVE AND FUNCTIONAL STATUS - GENERAL
DAILY ACTIVITIY SCORE: 24
DAILY ACTIVITIY SCORE: 24
MOBILITY SCORE: 24
MOBILITY SCORE: 24

## 2024-09-19 ASSESSMENT — PAIN SCALES - GENERAL
PAINLEVEL_OUTOF10: 8
PAINLEVEL_OUTOF10: 7
PAINLEVEL_OUTOF10: 8
PAINLEVEL_OUTOF10: 8

## 2024-09-19 ASSESSMENT — PAIN - FUNCTIONAL ASSESSMENT
PAIN_FUNCTIONAL_ASSESSMENT: 0-10

## 2024-09-19 NOTE — PROGRESS NOTES
Joellen Narayan [id number]       Procedure type: Red cell Exchange    Replacement Fluids:  6  units of PRBC used for procedure (RBCX)     Procedure Completed Without complications.    Attending notified of completion status. See flow sheet(s) for additional details.  Post-Vitals listed below.    Post-procedure vitals:  1147  BP: 120/62  Temp: 36 °C (96.8 °F)  Heart Rate: 85  Resp: 16  SpO2: 99 % LINDY LOU RN

## 2024-09-19 NOTE — NURSING NOTE
Raghav consulted for PIV placement by bedside RN. Patient is a sickle cell patient and not a new consult to the [facility] team. Patient has poor vasculature and vessels that are unable to sustain IV cannulation. Raghav RN recommended alternative access.

## 2024-09-19 NOTE — PROGRESS NOTES
Joellen Narayan is a 23 y.o. male on day 6 of admission presenting with Sickle cell crisis (Multi).    Subjective   Seen this AM at the bedside. Patient lost IV access overnight. Discussed talking to IV team for replacement but in the mean time he is not interested in subcutaneous dilaudid and would prefer to be on his home dose of oxycodone. Discussed increasing dose since he is still having groin pain, although improved from yesterday. Overnight, he also had a frontal lobe headache, resolved with sumatriptan. Discussed PRN fiorcet if needed for recurrent headache. States PO intake is okay. Last BM Tuesday. Denies worsening priapism, penis was evaluated and is bendable. He is aware of planned RBC exchange today at bedside and results of RUQ US and had no further questions. He denies any fevers/chills, SOB, or CP.    Objective     Physical Exam  Vitals reviewed.   Constitutional:       Appearance: Normal appearance.   HENT:      Head: Normocephalic and atraumatic.      Nose: Nose normal.      Mouth/Throat:      Mouth: Mucous membranes are moist.      Pharynx: Oropharynx is clear.   Eyes:      General: Scleral icterus present.      Extraocular Movements: Extraocular movements intact.      Pupils: Pupils are equal, round, and reactive to light.   Cardiovascular:      Rate and Rhythm: Normal rate and regular rhythm.      Pulses: Normal pulses.      Heart sounds: Normal heart sounds.   Pulmonary:      Effort: Pulmonary effort is normal.      Breath sounds: Normal breath sounds.   Abdominal:      General: Bowel sounds are normal.      Palpations: Abdomen is soft.      Tenderness: There is abdominal tenderness in the right lower quadrant.   Genitourinary:     Comments: Priapism noted. Penis bendable   Musculoskeletal:         General: Normal range of motion.   Skin:     General: Skin is warm.   Neurological:      General: No focal deficit present.      Mental Status: He is alert and oriented to person, place, and time.  "Mental status is at baseline.   Psychiatric:         Mood and Affect: Mood normal.         Behavior: Behavior normal.       Last Recorded Vitals  Blood pressure 132/67, pulse 92, temperature 37.2 °C (99 °F), temperature source Temporal, resp. rate 18, height 1.88 m (6' 2\"), weight 69.4 kg (153 lb), SpO2 97%.           Results for orders placed or performed during the hospital encounter of 09/12/24 (from the past 24 hour(s))   Prepare RBC: 6 Units   Result Value Ref Range    PRODUCT CODE X9582Y81     Unit Number [id number]     Unit ABO B     Unit RH NEG     XM INTEP COMP     Dispense Status RE     Blood Expiration Date 10/8/2024 11:59:00 PM EDT     PRODUCT BLOOD TYPE 1700     UNIT VOLUME 350     PRODUCT CODE I9291C46     Unit Number [id number]     Unit ABO B     Unit RH NEG     XM INTEP COMP     Dispense Status RE     Blood Expiration Date 10/10/2024 11:59:00 PM EDT     PRODUCT BLOOD TYPE 1700     UNIT VOLUME 350     PRODUCT CODE X6709C29     Unit Number [id number]     Unit ABO B     Unit RH NEG     XM INTEP COMP     Dispense Status RE     Blood Expiration Date 10/14/2024 11:59:00 PM EDT     PRODUCT BLOOD TYPE 1700     UNIT VOLUME 350     PRODUCT CODE P1295N83     Unit Number [id number]     Unit ABO B     Unit RH NEG     XM INTEP COMP     Dispense Status RE     Blood Expiration Date 10/8/2024 11:59:00 PM EDT     PRODUCT BLOOD TYPE 1700     UNIT VOLUME 286     PRODUCT CODE P0466U58     Unit Number [id number]     Unit ABO B     Unit RH NEG     XM INTEP COMP     Dispense Status RE     Blood Expiration Date 10/11/2024 11:59:00 PM EDT     PRODUCT BLOOD TYPE 1700     UNIT VOLUME 318     PRODUCT CODE A4445J59     Unit Number [id number]     Unit ABO O     Unit RH NEG     XM INTEP COMP     Dispense Status RE     Blood Expiration Date 9/30/2024 11:59:00 PM EDT     PRODUCT BLOOD TYPE 9500     UNIT VOLUME 279    Type and screen   Result Value Ref Range    ABO TYPE B     Rh TYPE POS     ANTIBODY " SCREEN NEG    Calcium, ionized   Result Value Ref Range    POCT Calcium, Ionized 1.24 1.1 - 1.33 mmol/L   CBC and Auto Differential   Result Value Ref Range    WBC 18.6 (H) 4.4 - 11.3 x10*3/uL    nRBC 1.3 (H) 0.0 - 0.0 /100 WBCs    RBC 2.79 (L) 4.50 - 5.90 x10*6/uL    Hemoglobin 8.3 (L) 13.5 - 17.5 g/dL    Hematocrit 23.9 (L) 41.0 - 52.0 %    MCV 86 80 - 100 fL    MCH 29.7 26.0 - 34.0 pg    MCHC 34.7 32.0 - 36.0 g/dL    RDW 19.9 (H) 11.5 - 14.5 %    Platelets 442 150 - 450 x10*3/uL    Neutrophils % 74.9 40.0 - 80.0 %    Immature Granulocytes %, Automated 2.2 (H) 0.0 - 0.9 %    Lymphocytes % 8.0 13.0 - 44.0 %    Monocytes % 13.4 2.0 - 10.0 %    Eosinophils % 1.1 0.0 - 6.0 %    Basophils % 0.4 0.0 - 2.0 %    Neutrophils Absolute 13.91 (H) 1.20 - 7.70 x10*3/uL    Immature Granulocytes Absolute, Automated 0.41 0.00 - 0.70 x10*3/uL    Lymphocytes Absolute 1.49 1.20 - 4.80 x10*3/uL    Monocytes Absolute 2.50 (H) 0.10 - 1.00 x10*3/uL    Eosinophils Absolute 0.20 0.00 - 0.70 x10*3/uL    Basophils Absolute 0.08 0.00 - 0.10 x10*3/uL   Reticulocytes   Result Value Ref Range    Retic % 18.4 (H) 0.5 - 2.0 %    Retic Absolute 0.512 (H) 0.022 - 0.118 x10*6/uL    Reticulocyte Hemoglobin 31 28 - 38 pg    Immature Retic fraction 25.2 (H) <=16.0 %   Prepare RBC   Result Value Ref Range    PRODUCT CODE B5879P20     Unit Number [id number]     Unit ABO O     Unit RH POS     XM INTEP COMP     Dispense Status RE     Blood Expiration Date 10/10/2024 11:59:00 PM EDT     PRODUCT BLOOD TYPE 5100     UNIT VOLUME 350     PRODUCT CODE C4223I44     Unit Number [id number]     Unit ABO O     Unit RH POS     XM INTEP COMP     Dispense Status RE     Blood Expiration Date 10/12/2024 11:59:00 PM EDT     PRODUCT BLOOD TYPE 5100     UNIT VOLUME 350     PRODUCT CODE C2101Y25     Unit Number [id number]     Unit ABO O     Unit RH POS     XM INTEP COMP     Dispense Status RE     Blood Expiration Date 10/12/2024 11:59:00 PM EDT     PRODUCT BLOOD  TYPE 5100     UNIT VOLUME 350     PRODUCT CODE U0541G11     Unit Number [id number]     Unit ABO O     Unit RH POS     XM INTEP COMP     Dispense Status RE     Blood Expiration Date 10/12/2024 11:59:00 PM EDT     PRODUCT BLOOD TYPE 5100     UNIT VOLUME 350     PRODUCT CODE N9325W52     Unit Number [id number]     Unit ABO O     Unit RH POS     XM INTEP COMP     Dispense Status RE     Blood Expiration Date 10/15/2024 11:59:00 PM EDT     PRODUCT BLOOD TYPE 5100     UNIT VOLUME 350     PRODUCT CODE Z7435X80     Unit Number [id number]     Unit ABO O     Unit RH NEG     XM INTEP COMP     Dispense Status RE     Blood Expiration Date 10/12/2024 11:59:00 PM EDT     PRODUCT BLOOD TYPE 9500     UNIT VOLUME 350    Prepare RBC   Result Value Ref Range    PRODUCT CODE D6803V33     Unit Number [id number]     Unit ABO B     Unit RH NEG     XM INTEP COMP     Dispense Status IS     Blood Expiration Date 10/8/2024 11:59:00 PM EDT     PRODUCT BLOOD TYPE 1700     UNIT VOLUME 350     PRODUCT CODE V3238K70     Unit Number [id number]     Unit ABO B     Unit RH NEG     XM INTEP COMP     Dispense Status IS     Blood Expiration Date 10/10/2024 11:59:00 PM EDT     PRODUCT BLOOD TYPE 1700     UNIT VOLUME 350     PRODUCT CODE L9614B63     Unit Number [id number]     Unit ABO B     Unit RH NEG     XM INTEP COMP     Dispense Status IS     Blood Expiration Date 10/14/2024 11:59:00 PM EDT     PRODUCT BLOOD TYPE 1700     UNIT VOLUME 350     PRODUCT CODE H0126Q39     Unit Number [id number]     Unit ABO B     Unit RH NEG     XM INTEP COMP     Dispense Status IS     Blood Expiration Date 10/8/2024 11:59:00 PM EDT     PRODUCT BLOOD TYPE 1700     UNIT VOLUME 286     PRODUCT CODE D0692W70     Unit Number [id number]     Unit ABO B     Unit RH NEG     XM INTEP COMP     Dispense Status IS     Blood Expiration Date 10/11/2024 11:59:00 PM EDT     PRODUCT BLOOD TYPE 1700     UNIT VOLUME 318     PRODUCT CODE I6408G00     Unit  Number [id number]     Unit ABO O     Unit RH NEG     XM INTEP COMP     Dispense Status IS     Blood Expiration Date 9/30/2024 11:59:00 PM EDT     PRODUCT BLOOD TYPE 9500     UNIT VOLUME 279        Scheduled medications  acetaminophen, 650 mg, oral, Once  baclofen, 5 mg, oral, TID  diphenhydrAMINE, 50 mg, oral, Once  flutamide, 125 mg, oral, TID  folic acid, 1 mg, oral, Daily  gabapentin, 100 mg, oral, q8h REYNALDO  heparin, 1,000 Units, intravenous, Once  heparin, 1,000 Units, intravenous, Once  ketoconazole, 200 mg, oral, BID  ketorolac, 30 mg, intravenous, Once  metoclopramide, 10 mg, intravenous, Once  oxygen, , inhalation, Continuous - Inhalation  pantoprazole, 20 mg, oral, Daily before breakfast  polyethylene glycol, 17 g, oral, Daily  predniSONE, 5 mg, oral, Daily  pseudoephedrine, 60 mg, oral, q8h  sennosides-docusate sodium, 2 tablet, oral, BID      Continuous medications     PRN medications  PRN medications: butalbital-acetaminophen-caff, calcium carbonate, diphenhydrAMINE, diphenhydrAMINE, [Held by provider] HYDROmorphone, ondansetron ODT **OR** [DISCONTINUED] ondansetron, oxyCODONE, oxygen, sodium chloride      US right upper quadrant    Result Date: 9/19/2024  Interpreted By:  Kasprzak, Jamshid,  and Ben Heidi STUDY: US RIGHT UPPER QUADRANT;  9/18/2024 3:26 pm   INDICATION: Signs/Symptoms:c/f cholecystitis.     COMPARISON: CT abdomen pelvis 08/23/2024   ACCESSION NUMBER(S): [id number]   ORDERING CLINICIAN: ROSEANNA SOSA   TECHNIQUE: Multiple images of the right upper quadrant were obtained.  This examination was interpreted at [facility].   FINDINGS: LIVER: The liver measures 19.2 cm and is grossly unremarkable and free of any focal lesions.   GALLBLADDER: The gallbladder is nondistended, and demonstrates no evidence of wall thickening or surrounding fluid. The gallbladder wall thickness is 0.2 cm. Sonographic Roman's sign is negative.   Multiple echogenic  gallstones within the gallbladder lumen posterior shadowing.   Several rounded nodules in the pericholecystic region measuring up to 0.9 cm favored to represent nonenlarged lymph nodes.   BILE DUCTS: No evidence of intra or extrahepatic biliary dilatation is identified; the common bile duct measures 0.3 cm.   PANCREAS: The pancreas is poorly visualized due to overlying bowel gas.   RIGHT KIDNEY: The right kidney measures 11.9 cm in length. The renal cortical echogenicity and thickness are within normal limit.  No hydronephrosis or renal calculi are seen.       1. Cholelithiasis without sonographic evidence of acute cholecystitis. 2. Mild hepatomegaly.   I personally reviewed the image(s)/study and resident interpretation as stated by Dr. Heidi Contreras MD. I agree with the findings as stated. This study was interpreted at [facility], [place], [place].   MACRO: None   Signed by: Jamshid Enrique 9/19/2024 7:40 AM Dictation workstation:   [id number]    IR CVC nontunneled    Result Date: 9/18/2024  Interpreted By:  Rakesh Pablo and Ritchie Brandon STUDY: IR CVC NONTUNNELED;  9/18/2024 2:24 pm   INDICATION: Signs/Symptoms:apheresis line placement for RBCEx.   COMPARISON: None.   ACCESSION NUMBER(S): [id number]   ORDERING CLINICIAN: ROSEANNA SOSA   TECHNIQUE: INTERVENTIONALIST(S): MD Vega Hinds DO, PGY-3   CONSENT: The patient was informed of the nature of the proposed procedure. The purposes, alternatives, risks, and benefits were explained and discussed. All questions were answered and consent was obtained.   RADIATION EXPOSURE: Fluoroscopy time: 0.1 min Dose: 0.54 mGy Dose Area Product (DAP): 122 mGy*cm^2   SEDATION: Moderate conscious IV sedation services (supervision of administration, induction, and maintenance) were provided by the physician performing the procedure with intravenous fentanyl 150mcg and versed 3mg for 20 minutes. The physician  was assisted by an independent trained observer, an interventional radiology nurse, in the continuous monitoring of patient level of consciousness and physiologic status. Please note, proximally 100 mcg fentanyl/2 mg Versed were provided via the micro puncture access secondary to IV infiltration.   MEDICATION: None.   TIME OUT: A time out was performed immediately prior to procedure start with the interventional team, correctly identifying the patient name, date of birth, MRN, procedure, anatomy (including marking of site and side), patient position, procedure consent form, relevant laboratory and imaging test results, antibiotic administration, safety precautions, and procedure-specific equipment needs.   COMPLICATIONS: No immediate adverse events identified.   FINDINGS: The patient was positioned supine on the angiography table. The right supraclavicular cutaneous tissues were prepared and draped in sterile manner.  1% lidocaine local anesthesia was instilled into the subcutaneous soft tissues at the selected access site for local anesthesia. Ultrasound images demonstrate a patent and compressible right internal jugular vein. Utilizing direct ultrasound guidance and micropuncture/Seldinger technique, the right internal jugular vein was accessed. An ultrasound digital spot image was acquired and stored on the  PACS. After confirmation of location, a 018 Malden-Mandrel guidewire was introduced and advanced into the inferior vena cava utilizing intermittent fluoroscopy.   The needle was removed with the guidewire left in place and exchanged for a 5-Honduran coaxial dilator. The inner dilator and wire were removed and a J-tipped 035 guidewire was introduced through the access sheath.  The skin tract was dilated with successive increase in size of vascular dilators under direct fluoroscopic visualization.   Subsequently, a temporary 11.5 Honduran x 15 cm catheter was advanced with its tip overlying the cavoatrial junction  under direct fluoroscopic guidance.  The catheter ports were aspirated and flushed with normal saline and charged with heparin. The external portions of the catheter were secured in place with sterile suture dressings.   The patient tolerated the procedure without complication.       1. Technically successful and uncomplicated placement of a right internal jugular temporary apheresis catheter under direct ultrasound and fluoroscopic guidance - optimal catheter tip position at the right atrial superior vena caval junction and the catheter is ready for immediate use.   I was present for and/or performed the critical portions of the procedure and immediately available throughout the entire procedure.   I personally reviewed the image(s) / study and resident interpretation. I agree with the findings as stated.   Performed and dictated at [facility].   MACRO: None.   Signed by: Rakesh Pablo 9/18/2024 2:48 PM Dictation workstation:   [id number]        Assessment/Plan   Assessment & Plan  Sickle cell crisis (Multi)  Joellen Narayan is a 23 y.o. 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 CXR (9/12) negative for acute cardiopulmonary process. Urology following patient for priapism, upon presentation, patient did not agree to bedside drainage and corporal blood gas. 9/13 started on Ketoconazole 200mg BID and prednisone 5mg daily per urology recs. Penile doppler US ordered 9/15 per urology, showing normal caliber and peak systolic  velocities of b/l cavernosal arteries which have improved since prior. Hematology consulted on 9/13 give persistent priapism x1 month, recommend emergent RBCex. 9/13 plan for apheresis line placement in the ED followed by emergent RBCex, however, patient declined placement of line in ED on 9/13, plan for apheresis line placement with IR followed by RBCex on 9/16. On 9/16 he then again declined apheresis line placement and RBCEx. Pain was stable x2 days until again 9/18 pt endorsed significant groin pain, penis still bendable. Discussed with urology, pt to be re-evaluated. Tbili up to 8.0 (9/18)-, 11.1 (9/19); RUQ US with cholelithiasis without cholecystitis (9/18). Pt now amenable to RBCEx, IR placed apheresis line 9/18 and TM to exchange pt 9/19. Patient lost IV access on 9/19, pain is improving and refused subcutaneous pain interventions while waiting for new IV, ordered increased home oxycodone dose for pain management. Discharge pending improvement in pain, stability of priapism, improvement in LFTs, and RBCEx.     # Priapism  - Presented to the ED with worsening priapism since 5pm (hadn't resolved from last admission)  - s/p recent admission 8/31-9/9, OR with urology for priapism drainage, penile block, and corpora cavernosa irrigation  - Also, s/p scheduled Sudafed q8h and added ibuprofen, baclofen 5mg TID and gabapentin 100mg TID  - Penile doppler US while prior inpatient (9/1) showing small caliber of bilateral cavernosal arteries with lack of color flow in portions of both arteries, more on the right, c/w low-flow/ischemic priapism. Urology notified, s/p phenylephrine injection with urology 9/1 evening with partial resolution of priapism  - Reassessed by Urology 9/7/24 reevaluate penile appearance; firm but still bendable with no increased pain. Rec to continue management as prior and to maintain outpatient appointment on 9/10/24.  - Outpatient appointment 9/10 prescribed Flutamide 125mg TID but per parent,  no pharmacy has this medication so it was not started  - Doppler U/S completed outpatient 9/10 noted arterial flow  - Urology following rec bedside drainage and corporal blood gas and phenylepinephine injection --patient refusing; also rec starting treatment with ketoconazole with prednisone, 9/15 rec penile doppler US    - Penile doppler US (9/15) per urology, showing normal caliber and peak systolic velocities of b/l cavernosal arteries which have improved since prior.   - Heme following and rec emergent RBCex in setting of prolonged priapism  - Continue home Sudafed q8h prn and added ibuprofen, Baclofen 5mg TID and Gabapentin 100mg TID  - 9/13 started on ketoconazole 200mg BID and Prednisone 5mg daily-per urology recs pt to be dc'd with this  - C. Trachomatis/N. Gonorrhoeae (9/14): negative   - Initially planned for aphesis line placement followed by RBCex on 9/13, however, patient declined placement of apheresis line in the ED, primary, hematology and urology team explained risks to patient and he continued to decline   - Planed for apheresis line placement in IR on 9/16 followed by RBCex - hematology, Dr. Cruz, and trransfusion med team aware--> however on 9/16 pt declined apheresis line placement and RBCEx again  - 9/16 pt was educated on risks of refusing/delaying medical care and recurrent priapism  - Pain was stable x2 days until again 9/18 pt endorsed significant groin pain, penis still bendable. Discussed with urology, pt to be re-evaluated.   - Tbili increased to 8.0 (9/18); 11.1 (9/19); continue to trend   - RUQ US with cholelithiasis without cholecystitis (9/18)   - Pt now amenable to RBCEx, IR placed apheresis line 9/18 and TM to exchange pt either  9/19   - On 9/19, patient reporting improvement of groin pain, mild RLQ tenderness, and is agreeable to planned RBCex 9/19      # Hgb SS with severe pain  - OARRS reviewed, no aberrancies  - No current care path  - Hgb baseline ~8.5; currently stable, no  indication for simple transfusion   - Tbili baseline fluctuates ~5-1; Tbili 4.4 on 9/16, up to 6.0 on 9/17  - LDH baseline ~500   on 9/16  - s/p IV Dilaudid 2mg q2hrs PRN severe pain (9/12-9/16)  - 9/16 weaned pain meds to IV dilaudid 1mg q3hrs PRN severe pain  - Overnight patient lost IV access, refused subcutaneous dilaudid but since pain is improving agreeable to start 20mg PO oxycodone Q4 hours for severe pain (9/19)   - Continue folic acid 1mg daily  - Hgb S (9/12): 59.8%   - Repeat Hgb S (9/18): 59.9%  - Heme following (see above)  - PO Zofran PRN for opioid-induced nausea, PO Benadryl PRN for opioid-induced pruritus, Bowel regimen for opioid-induced constipation with DocuSenna 2tabs BID and Miralax daily      # Nodular lymphocyte predominant Hodgkins lymphoma (NLPHL)   - Primary Oncologist: Dr. Stoll  - Enlarged lymph nodes noted 4/1/22  - RUQ US (11/14/22) with mildly enlarged LNs in the region of the kavin hepatis  - MRI liver (11/16/22) showed re-demonstration of bulky retroperitoneal lymphadenopathy and kavin hepatic lymphadenopathy    - (11/18/22) lymph node biopsy showed atypical lymphoid infiltrate. Reviewed by Hemepath board, insufficient for lymphoma diagnosis  - PET/CT (12/6/22) showing retroperitoneal lymphadenopathy  - Followed up with Dr. Stoll (12/16/22) with plan for surg/onc consult for large tissue bx but patient missed apt and was lost to follow up  - Requested new apt with Dr. Ronnie Marte 6/19/23, patient was not seen and lost to follow up  - CT a/p (11/28/23) increased size of retroperitoneal lymph nodes reflecting extramedullary hematopoiesis I/s/o sickle cell vs lymphoma  - Paraaortic LN bx via IR (11/30/23) consistent with NLPHL. Flow: no clonal B cell or T cell population identified, lymphocyte 95%, CD3+CD4+ 68%, CD3+CD8+ 7%, CD19+ 24%  - Elevated LDH/bili partially from sickle cell disease   - Chemotherapy (R-CHOP) was discussed with primary oncologist Dr. Stoll, and  "decision was made to simplify his chemotherapy to Rituxan and prednisone q3 weeks mainly due to frequent sickle cell crisis  - Current chemo regimen: Rituxan and prednisone q3 weeks (C1 1/18/24, C2 2/8/24, Missed C3 d/t sickle cell pain crisis, C4 4/4/24, C5 5/16/24, C6 6/7/24)  - Per pt no longer taking Acyclovir 400mg BID prophy   - PET CT (7/25) overall showing great response to treatment with persistent residual viable disease involving a left common iliac node  - Last FUV with Dr. Stoll 7/25/24 rec RTC in 2 months (next FUV scheduled for 9/19)     # Choledocholithiasis  - s/p ERCP 7/18/24 with a biliary sphincterotomy where sludge and stones were removed, achieving complete clearance  - Seen by gen surg 8/8/24 Dr. Dove who rec lap cholecystectomy d/t the chance of recurrence of choledocholithiasis  - Surgery has yet to be scheduled, has FUV with gen surg again 9/30  - Tbili 4.4 on 9/16, (recent peak at 52.8 prior to ERCP during recent hospital admission); increased to 8.9 (9/18); 11.1 (9/19); continue to trend daily    - RUQ US with cholelithiasis without cholecystitis (9/18)      # Hx of nocturnal oxygen dependence and hypoxia on room air  - Has not had home oxygen for 2-3 years   - Wean as able, encourage incentive spirometry  - Pulm FUV 8/8- \"Given his history of sickle cell disease, it is important to ensure he has formal sleep testing to look at desaturations and presence of sleep apnea despite not having a convincing history/body habitus typical for suspecting sleep apnea- patients with sickle cell have a higher prevalence of sleep disorders including nocturnal hypoxemia\"--> rec sleep referral for formal testing if deemed appropriate, ABG and O2 destat testing, and TTE with bubble study  - TTE 8/23 showing EF 60-65%, severely dilated LV and LA, + intrapulmonary shunting       DVT prophy: Lovenox subcutaneous, SCDs, encourage ambulation     DISPO:  - Full code, confirmed on admit  - Discharge pending " improvement in pain and stability of priapism and RBCex   - NOK Melissa Narayan (Parent) [phone]  - Sickle cell FUV 9/18 (pt to reschedule on own), Dr. Stoll FUV 9/19, Gen Surg FUV 9/30, Urology FUV 10/7    I spent >60 minutes in the professional and overall care of this patient    Assessment and plan discussed with attending physician Dr. Deanna Arana, APRN-CNP

## 2024-09-19 NOTE — PROGRESS NOTES
09/16/24 1000   Discharge Planning   Who is requesting discharge planning? Provider   Home or Post Acute Services None   Expected Discharge Disposition Home   Does the patient need discharge transport arranged? No     9/16/24 @ 1050  Patient admitted for chest and penile pain due to sickle cell crisis. Urology following patient. Patient to get exchanged today per team. Anticipate no needs at discharge. Will continue to follow patient during his hospital stay for any needs that may arise.  MARIELA Lo    9/19/24 @ 1155  Patient to get exchanged today. At this time, patient remains no needs at discharge. Will continue to follow.  Mari Ko RN TCC

## 2024-09-19 NOTE — CARE PLAN
The patient's goals for the shift include      The clinical goals for the shift include pt will report pain < 7/10 through end of shift 9/19/24 @ 0700      Problem: Pain  Goal: Takes deep breaths with improved pain control throughout the shift  Outcome: Progressing  Goal: Turns in bed with improved pain control throughout the shift  Outcome: Progressing  Goal: Walks with improved pain control throughout the shift  Outcome: Progressing     Problem: Pain - Adult  Goal: Verbalizes/displays adequate comfort level or baseline comfort level  Outcome: Progressing     Problem: Discharge Planning  Goal: Discharge to home or other facility with appropriate resources  Outcome: Progressing     Problem: Chronic Conditions and Co-morbidities  Goal: Patient's chronic conditions and co-morbidity symptoms are monitored and maintained or improved  Outcome: Progressing

## 2024-09-19 NOTE — POST-PROCEDURE NOTE
This is a 23 y.o. male with Sickle Cell Disease and priapism who underwent automated red blood cell exchange (RCE) with 6 RBC units with target HgbS 27% and target HCT 28%.     I saw and evaluated the patient during the RCE.  The patient was resting in bed.  Vascular access functioned well.    WBC   Date/Time Value Ref Range Status   09/19/2024 06:58 AM 18.6 (H) 4.4 - 11.3 x10*3/uL Final     Hemoglobin   Date/Time Value Ref Range Status   09/19/2024 06:58 AM 8.3 (L) 13.5 - 17.5 g/dL Final     Hematocrit   Date/Time Value Ref Range Status   09/19/2024 06:58 AM 23.9 (L) 41.0 - 52.0 % Final     Platelets   Date/Time Value Ref Range Status   09/19/2024 06:58  150 - 450 x10*3/uL Final        POCT Calcium, Ionized   Date/Time Value Ref Range Status   09/18/2024 06:02 PM 1.24 1.1 - 1.33 mmol/L Final     Comment:     The performance characteristics of ionized calcium tested  in heparinized plasma or serum have been validated by the  individual  laboratory site where testing is performed.   Testing on heparinized plasma or serum is not approved by   the FDA; however, such approval is not necessary.        Hemoglobin S   Date/Time Value Ref Range Status   09/18/2024 07:30 AM 59.9 (H) <=0.0 % Final        Ferritin   Date/Time Value Ref Range Status   07/20/2024 07:09  (H) 20 - 300 ng/mL Final        Pre-procedure vital signs at 0957:  T: 36.4 C, HR: 100, RR: 16, BP: 139/72  Pulse oximetry: 95% on room air    Post-procedure vital signs at 1147:  T: 36 C, HR: 85, RR: 16, BP: 120/62  Pulse oximetry: 99% on room air    Outcome: RCE completed.  Adverse event: none      Assessment and Plan:  23 y.o. male with Sickle Cell Disease who underwent RCE.    Draw post-procedure labs  Vascular access to be managed by the clinical team.  No further RCE planned for this admission

## 2024-09-19 NOTE — ASSESSMENT & PLAN NOTE
Joellen Narayan is a 23 y.o. 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 CXR (9/12) negative for acute cardiopulmonary process. Urology following patient for priapism, upon presentation, patient did not agree to bedside drainage and corporal blood gas. 9/13 started on Ketoconazole 200mg BID and prednisone 5mg daily per urology recs. Penile doppler US ordered 9/15 per urology, showing normal caliber and peak systolic velocities of b/l cavernosal arteries which have improved since prior. Hematology consulted on 9/13 give persistent priapism x1 month, recommend emergent RBCex. 9/13 plan for apheresis line placement in the ED followed by emergent RBCex, however, patient declined placement of line in ED on 9/13, plan for apheresis line placement with IR followed by RBCex on 9/16. On 9/16 he then again declined apheresis line placement and RBCEx. Pain was stable x2 days until again 9/18 pt endorsed significant groin pain, penis still bendable. Discussed with urology, pt to be re-evaluated. Tbili up to 8.0 (9/18)-, 11.1 (9/19); RUQ US with cholelithiasis without cholecystitis (9/18). Pt now amenable to RBCEx, IR placed apheresis line 9/18 and TM to exchange pt 9/19. Patient lost IV access on 9/19, pain is improving and refused subcutaneous pain interventions while waiting for new IV, ordered increased home oxycodone dose for pain management. Discharge pending improvement in pain, stability of priapism, improvement in LFTs, and RBCEx.     # Priapism  - Presented to the ED  with worsening priapism since 5pm (hadn't resolved from last admission)  - s/p recent admission 8/31-9/9, OR with urology for priapism drainage, penile block, and corpora cavernosa irrigation  - Also, s/p scheduled Sudafed q8h and added ibuprofen, baclofen 5mg TID and gabapentin 100mg TID  - Penile doppler US while prior inpatient (9/1) showing small caliber of bilateral cavernosal arteries with lack of color flow in portions of both arteries, more on the right, c/w low-flow/ischemic priapism. Urology notified, s/p phenylephrine injection with urology 9/1 evening with partial resolution of priapism  - Reassessed by Urology 9/7/24 reevaluate penile appearance; firm but still bendable with no increased pain. Rec to continue management as prior and to maintain outpatient appointment on 9/10/24.  - Outpatient appointment 9/10 prescribed Flutamide 125mg TID but per parent, no pharmacy has this medication so it was not started  - Doppler U/S completed outpatient 9/10 noted arterial flow  - Urology following rec bedside drainage and corporal blood gas and phenylepinephine injection --patient refusing; also rec starting treatment with ketoconazole with prednisone, 9/15 rec penile doppler US    - Penile doppler US (9/15) per urology, showing normal caliber and peak systolic velocities of b/l cavernosal arteries which have improved since prior.   - Heme following and rec emergent RBCex in setting of prolonged priapism  - Continue home Sudafed q8h prn and added ibuprofen, Baclofen 5mg TID and Gabapentin 100mg TID  - 9/13 started on ketoconazole 200mg BID and Prednisone 5mg daily-per urology recs pt to be dc'd with this  - C. Trachomatis/N. Gonorrhoeae (9/14): negative   - Initially planned for aphesis line placement followed by RBCex on 9/13, however, patient declined placement of apheresis line in the ED, primary, hematology and urology team explained risks to patient and he continued to decline   - Planed for apheresis line  placement in IR on 9/16 followed by RBCex - hematology, Dr. Cruz, and trransfusion med team aware--> however on 9/16 pt declined apheresis line placement and RBCEx again  - 9/16 pt was educated on risks of refusing/delaying medical care and recurrent priapism  - Pain was stable x2 days until again 9/18 pt endorsed significant groin pain, penis still bendable. Discussed with urology, pt to be re-evaluated.   - Tbili increased to 8.0 (9/18); 11.1 (9/19); continue to trend   - RUQ US with cholelithiasis without cholecystitis (9/18)   - Pt now amenable to RBCEx, IR placed apheresis line 9/18 and TM to exchange pt either  9/19   - On 9/19, patient reporting improvement of groin pain, mild RLQ tenderness, and is agreeable to planned RBCex 9/19      # Hgb SS with severe pain  - OARRS reviewed, no aberrancies  - No current care path  - Hgb baseline ~8.5; currently stable, no indication for simple transfusion   - Tbili baseline fluctuates ~5-1; Tbili 4.4 on 9/16, up to 6.0 on 9/17  - LDH baseline ~500   on 9/16  - s/p IV Dilaudid 2mg q2hrs PRN severe pain (9/12-9/16)  - 9/16 weaned pain meds to IV dilaudid 1mg q3hrs PRN severe pain  - Overnight patient lost IV access, refused subcutaneous dilaudid but since pain is improving agreeable to start 20mg PO oxycodone Q4 hours for severe pain (9/19)   - Continue folic acid 1mg daily  - Hgb S (9/12): 59.8%   - Repeat Hgb S (9/18): 59.9%  - Heme following (see above)  - PO Zofran PRN for opioid-induced nausea, PO Benadryl PRN for opioid-induced pruritus, Bowel regimen for opioid-induced constipation with DocuSenna 2tabs BID and Miralax daily      # Nodular lymphocyte predominant Hodgkins lymphoma (NLPHL)   - Primary Oncologist: Dr. Stoll  - Enlarged lymph nodes noted 4/1/22  - RUQ US (11/14/22) with mildly enlarged LNs in the region of the kavin hepatis  - MRI liver (11/16/22) showed re-demonstration of bulky retroperitoneal lymphadenopathy and kavin hepatic lymphadenopathy     - (11/18/22) lymph node biopsy showed atypical lymphoid infiltrate. Reviewed by Hemepath board, insufficient for lymphoma diagnosis  - PET/CT (12/6/22) showing retroperitoneal lymphadenopathy  - Followed up with Dr. Stoll (12/16/22) with plan for surg/onc consult for large tissue bx but patient missed apt and was lost to follow up  - Requested new apt with Dr. Ronnie Marte 6/19/23, patient was not seen and lost to follow up  - CT a/p (11/28/23) increased size of retroperitoneal lymph nodes reflecting extramedullary hematopoiesis I/s/o sickle cell vs lymphoma  - Paraaortic LN bx via IR (11/30/23) consistent with NLPHL. Flow: no clonal B cell or T cell population identified, lymphocyte 95%, CD3+CD4+ 68%, CD3+CD8+ 7%, CD19+ 24%  - Elevated LDH/bili partially from sickle cell disease   - Chemotherapy (R-CHOP) was discussed with primary oncologist Dr. Stoll, and decision was made to simplify his chemotherapy to Rituxan and prednisone q3 weeks mainly due to frequent sickle cell crisis  - Current chemo regimen: Rituxan and prednisone q3 weeks (C1 1/18/24, C2 2/8/24, Missed C3 d/t sickle cell pain crisis, C4 4/4/24, C5 5/16/24, C6 6/7/24)  - Per pt no longer taking Acyclovir 400mg BID prophy   - PET CT (7/25) overall showing great response to treatment with persistent residual viable disease involving a left common iliac node  - Last FUV with Dr. Stoll 7/25/24 rec RTC in 2 months (next FUV scheduled for 9/19)     # Choledocholithiasis  - s/p ERCP 7/18/24 with a biliary sphincterotomy where sludge and stones were removed, achieving complete clearance  - Seen by gen surg 8/8/24 Dr. Dove who rec lap cholecystectomy d/t the chance of recurrence of choledocholithiasis  - Surgery has yet to be scheduled, has FUV with gen surg again 9/30  - Tbili 4.4 on 9/16, (recent peak at 52.8 prior to ERCP during recent hospital admission); increased to 8.9 (9/18); 11.1 (9/19); continue to trend daily    - RUQ US with cholelithiasis  "without cholecystitis (9/18)      # Hx of nocturnal oxygen dependence and hypoxia on room air  - Has not had home oxygen for 2-3 years   - Wean as able, encourage incentive spirometry  - Pulm FUV 8/8- \"Given his history of sickle cell disease, it is important to ensure he has formal sleep testing to look at desaturations and presence of sleep apnea despite not having a convincing history/body habitus typical for suspecting sleep apnea- patients with sickle cell have a higher prevalence of sleep disorders including nocturnal hypoxemia\"--> rec sleep referral for formal testing if deemed appropriate, ABG and O2 destat testing, and TTE with bubble study  - TTE 8/23 showing EF 60-65%, severely dilated LV and LA, + intrapulmonary shunting       DVT prophy: Lovenox subcutaneous, SCDs, encourage ambulation     DISPO:  - Full code, confirmed on admit  - Discharge pending improvement in pain and stability of priapism and RBCex   - SUSIE Narayan (Parent) [phone]  - Sickle cell FUV 9/18 (pt to reschedule on own), Dr. Stoll FUV 9/19, Gen Surg FUV 9/30, Urology FUV 10/7  "

## 2024-09-19 NOTE — DOCUMENTATION CLARIFICATION NOTE
"    PATIENT:               AMARIS BLANCO  ACCT #:                  [id number]  MRN:                       [id number]  :                       2000  ADMIT DATE:       2024 7:53 PM  DISCH DATE:  RESPONDING PROVIDER #:        [id number]          PROVIDER RESPONSE TEXT:    Non-infectious SIRS without acute organ dysfunction    CDI QUERY TEXT:    Clarification        Instruction:    Based on your assessment of the patient and the clinical information, please provide the requested documentation by clicking on the appropriate radio button and enter any additional information if prompted.    Question: Please further clarify if there is a diagnosis related to the clinical information    When answering this query, please exercise your independent professional judgment. The fact that a question is being asked, does not imply that any particular answer is desired or expected.    The patient's clinical indicators include:  Clinical Information: 22 yo M with PMHx of NLPHL on tx, HbSS, nocturnal hypoxia admitted for sickle cell pain crisis with priapism.    Clinical Indicators:  WBC -24-12.9, 20.1, 18.6  HR- -- 101, 96, 91, 88, 92, 90, 76, 102, 92, 100    Assessment and Plan- Oncology  at 1000:  \"Priapism  - s/p scheduled Sudafed q8h and added ibuprofen, baclofen 5mg TID and gabapentin 100mg TID  Hgb SS with severe pain  - s/p IV Dilaudid 2mg q2hrs PRN severe pain -  -  weaned pain meds to IV dilaudid 1mg q3hrs PRN severe pain  - start 20mg PO oxycodone Q4 hours for severe pain \"    Treatment:  -monitoring vitals and labs  -sudafed, ibuprofen, baclofen, gabapentin, IV dilaudid, PO oxycodone  -LR IV bolus 1,000ml       Risk Factors:  -Sickle Cell Pain Crisis with priapism  Options provided:  -- Non-infectious SIRS without acute organ dysfunction  -- Other - I will add my own diagnosis  -- Refer to Clinical Documentation Reviewer    Query created by: Shilpi Andrew on 2024 1:50 " PM      Electronically signed by:  DONNA RODRIGUES 9/19/2024 2:49 PM

## 2024-09-19 NOTE — CARE PLAN
The patient's goals for the shift include      The clinical goals for the shift include pt will report pain less than 7/10 by end of shift      Problem: Pain  Goal: Takes deep breaths with improved pain control throughout the shift  Outcome: Progressing  Goal: Turns in bed with improved pain control throughout the shift  Outcome: Progressing  Goal: Walks with improved pain control throughout the shift  Outcome: Progressing  Goal: Performs ADL's with improved pain control throughout shift  Outcome: Progressing  Goal: Participates in PT with improved pain control throughout the shift  Outcome: Progressing  Goal: Free from opioid side effects throughout the shift  Outcome: Progressing  Goal: Free from acute confusion related to pain meds throughout the shift  Outcome: Progressing     Problem: Pain - Adult  Goal: Verbalizes/displays adequate comfort level or baseline comfort level  Outcome: Progressing     Problem: Safety - Adult  Goal: Free from fall injury  Outcome: Progressing     Problem: Discharge Planning  Goal: Discharge to home or other facility with appropriate resources  Outcome: Progressing     Problem: Chronic Conditions and Co-morbidities  Goal: Patient's chronic conditions and co-morbidity symptoms are monitored and maintained or improved  Outcome: Progressing     Problem: Fall/Injury  Goal: Not fall by end of shift  Outcome: Progressing  Goal: Be free from injury by end of the shift  Outcome: Progressing  Goal: Verbalize understanding of personal risk factors for fall in the hospital  Outcome: Progressing  Goal: Verbalize understanding of risk factor reduction measures to prevent injury from fall in the home  Outcome: Progressing  Goal: Use assistive devices by end of the shift  Outcome: Progressing  Goal: Pace activities to prevent fatigue by end of the shift  Outcome: Progressing

## 2024-09-20 ENCOUNTER — OFFICE VISIT (OUTPATIENT)
Dept: HEMATOLOGY/ONCOLOGY | Facility: HOSPITAL | Age: 24
DRG: 812 | End: 2024-09-20
Payer: COMMERCIAL

## 2024-09-20 ENCOUNTER — APPOINTMENT (OUTPATIENT)
Dept: RADIOLOGY | Facility: HOSPITAL | Age: 24
DRG: 812 | End: 2024-09-20
Payer: COMMERCIAL

## 2024-09-20 LAB
ALBUMIN SERPL BCP-MCNC: 4 G/DL (ref 3.4–5)
ALP SERPL-CCNC: 130 U/L (ref 33–120)
ALT SERPL W P-5'-P-CCNC: 24 U/L (ref 10–52)
ANION GAP SERPL CALC-SCNC: 12 MMOL/L (ref 10–20)
AST SERPL W P-5'-P-CCNC: 35 U/L (ref 9–39)
BASOPHILS # BLD AUTO: 0.09 X10*3/UL (ref 0–0.1)
BASOPHILS NFR BLD AUTO: 0.6 %
BILIRUB SERPL-MCNC: 6.3 MG/DL (ref 0–1.2)
BUN SERPL-MCNC: 7 MG/DL (ref 6–23)
CALCIUM SERPL-MCNC: 9.1 MG/DL (ref 8.6–10.6)
CARDIAC TROPONIN I PNL SERPL HS: 6 NG/L (ref 0–53)
CHLORIDE SERPL-SCNC: 103 MMOL/L (ref 98–107)
CO2 SERPL-SCNC: 26 MMOL/L (ref 21–32)
CREAT SERPL-MCNC: 0.69 MG/DL (ref 0.5–1.3)
EGFRCR SERPLBLD CKD-EPI 2021: >90 ML/MIN/1.73M*2
EOSINOPHIL # BLD AUTO: 0.47 X10*3/UL (ref 0–0.7)
EOSINOPHIL NFR BLD AUTO: 3.1 %
ERYTHROCYTE [DISTWIDTH] IN BLOOD BY AUTOMATED COUNT: 20.1 % (ref 11.5–14.5)
GLUCOSE SERPL-MCNC: 82 MG/DL (ref 74–99)
HCT VFR BLD AUTO: 29.7 % (ref 41–52)
HEMOGLOBIN A2: 2.8 % (ref 2–3.5)
HEMOGLOBIN A: 76.5 % (ref 95.8–98)
HEMOGLOBIN F: 1.2 % (ref 0–2)
HEMOGLOBIN IDENTIFICATION INTERPRETATION: ABNORMAL
HEMOGLOBIN S: 19.5 %
HGB BLD-MCNC: 10.1 G/DL (ref 13.5–17.5)
HGB RETIC QN: 31 PG (ref 28–38)
IMM GRANULOCYTES # BLD AUTO: 0.31 X10*3/UL (ref 0–0.7)
IMM GRANULOCYTES NFR BLD AUTO: 2 % (ref 0–0.9)
IMMATURE RETIC FRACTION: 41.7 %
LDH SERPL L TO P-CCNC: 336 U/L (ref 84–246)
LYMPHOCYTES # BLD AUTO: 2.56 X10*3/UL (ref 1.2–4.8)
LYMPHOCYTES NFR BLD AUTO: 16.8 %
MAGNESIUM SERPL-MCNC: 2.04 MG/DL (ref 1.6–2.4)
MCH RBC QN AUTO: 27.8 PG (ref 26–34)
MCHC RBC AUTO-ENTMCNC: 34 G/DL (ref 32–36)
MCV RBC AUTO: 82 FL (ref 80–100)
MONOCYTES # BLD AUTO: 1.79 X10*3/UL (ref 0.1–1)
MONOCYTES NFR BLD AUTO: 11.8 %
NEUTROPHILS # BLD AUTO: 10 X10*3/UL (ref 1.2–7.7)
NEUTROPHILS NFR BLD AUTO: 65.7 %
NRBC BLD-RTO: 3.1 /100 WBCS (ref 0–0)
OVALOCYTES BLD QL SMEAR: NORMAL
PATH REVIEW-HGB IDENTIFICATION: ABNORMAL
PLATELET # BLD AUTO: 308 X10*3/UL (ref 150–450)
POLYCHROMASIA BLD QL SMEAR: NORMAL
POTASSIUM SERPL-SCNC: 3.8 MMOL/L (ref 3.5–5.3)
PROT SERPL-MCNC: 6.2 G/DL (ref 6.4–8.2)
RBC # BLD AUTO: 3.63 X10*6/UL (ref 4.5–5.9)
RBC MORPH BLD: NORMAL
RETICS #: 0.41 X10*6/UL (ref 0.02–0.12)
RETICS/RBC NFR AUTO: 11.2 % (ref 0.5–2)
SICKLE CELLS BLD QL SMEAR: NORMAL
SODIUM SERPL-SCNC: 137 MMOL/L (ref 136–145)
TARGETS BLD QL SMEAR: NORMAL
WBC # BLD AUTO: 15.2 X10*3/UL (ref 4.4–11.3)

## 2024-09-20 PROCEDURE — 36416 COLLJ CAPILLARY BLOOD SPEC: CPT

## 2024-09-20 PROCEDURE — 93010 ELECTROCARDIOGRAM REPORT: CPT | Performed by: INTERNAL MEDICINE

## 2024-09-20 PROCEDURE — 83735 ASSAY OF MAGNESIUM: CPT | Performed by: NURSE PRACTITIONER

## 2024-09-20 PROCEDURE — 2500000004 HC RX 250 GENERAL PHARMACY W/ HCPCS (ALT 636 FOR OP/ED)

## 2024-09-20 PROCEDURE — 71045 X-RAY EXAM CHEST 1 VIEW: CPT

## 2024-09-20 PROCEDURE — 2500000002 HC RX 250 W HCPCS SELF ADMINISTERED DRUGS (ALT 637 FOR MEDICARE OP, ALT 636 FOR OP/ED)

## 2024-09-20 PROCEDURE — 84484 ASSAY OF TROPONIN QUANT: CPT

## 2024-09-20 PROCEDURE — 85025 COMPLETE CBC W/AUTO DIFF WBC: CPT

## 2024-09-20 PROCEDURE — 2500000005 HC RX 250 GENERAL PHARMACY W/O HCPCS

## 2024-09-20 PROCEDURE — 2500000005 HC RX 250 GENERAL PHARMACY W/O HCPCS: Performed by: PHYSICIAN ASSISTANT

## 2024-09-20 PROCEDURE — 93005 ELECTROCARDIOGRAM TRACING: CPT

## 2024-09-20 PROCEDURE — 1170000001 HC PRIVATE ONCOLOGY ROOM DAILY

## 2024-09-20 PROCEDURE — 80053 COMPREHEN METABOLIC PANEL: CPT

## 2024-09-20 PROCEDURE — 2500000001 HC RX 250 WO HCPCS SELF ADMINISTERED DRUGS (ALT 637 FOR MEDICARE OP): Performed by: NURSE PRACTITIONER

## 2024-09-20 PROCEDURE — 83615 LACTATE (LD) (LDH) ENZYME: CPT

## 2024-09-20 PROCEDURE — 2500000001 HC RX 250 WO HCPCS SELF ADMINISTERED DRUGS (ALT 637 FOR MEDICARE OP)

## 2024-09-20 PROCEDURE — 2500000004 HC RX 250 GENERAL PHARMACY W/ HCPCS (ALT 636 FOR OP/ED): Performed by: NURSE PRACTITIONER

## 2024-09-20 PROCEDURE — 99233 SBSQ HOSP IP/OBS HIGH 50: CPT

## 2024-09-20 PROCEDURE — 71045 X-RAY EXAM CHEST 1 VIEW: CPT | Performed by: RADIOLOGY

## 2024-09-20 PROCEDURE — 85045 AUTOMATED RETICULOCYTE COUNT: CPT

## 2024-09-20 RX ORDER — HYDROMORPHONE HYDROCHLORIDE 1 MG/ML
1 INJECTION, SOLUTION INTRAMUSCULAR; INTRAVENOUS; SUBCUTANEOUS ONCE
Status: COMPLETED | OUTPATIENT
Start: 2024-09-20 | End: 2024-09-20

## 2024-09-20 RX ADMIN — PSEUDOEPHEDRINE HCL 60 MG: 30 TABLET, FILM COATED ORAL at 15:31

## 2024-09-20 RX ADMIN — ONDANSETRON 8 MG: 8 TABLET, ORALLY DISINTEGRATING ORAL at 17:47

## 2024-09-20 RX ADMIN — HYDROMORPHONE HYDROCHLORIDE 1 MG: 1 INJECTION, SOLUTION INTRAMUSCULAR; INTRAVENOUS; SUBCUTANEOUS at 10:13

## 2024-09-20 RX ADMIN — HYDROMORPHONE HYDROCHLORIDE 1 MG: 1 INJECTION, SOLUTION INTRAMUSCULAR; INTRAVENOUS; SUBCUTANEOUS at 07:38

## 2024-09-20 RX ADMIN — HYDROMORPHONE HYDROCHLORIDE 2 MG: 2 INJECTION, SOLUTION INTRAMUSCULAR; INTRAVENOUS; SUBCUTANEOUS at 16:19

## 2024-09-20 RX ADMIN — SENNOSIDES AND DOCUSATE SODIUM 2 TABLET: 8.6; 5 TABLET ORAL at 09:31

## 2024-09-20 RX ADMIN — HYDROMORPHONE HYDROCHLORIDE 2 MG: 2 INJECTION, SOLUTION INTRAMUSCULAR; INTRAVENOUS; SUBCUTANEOUS at 14:04

## 2024-09-20 RX ADMIN — HYDROMORPHONE HYDROCHLORIDE 2 MG: 2 INJECTION, SOLUTION INTRAMUSCULAR; INTRAVENOUS; SUBCUTANEOUS at 22:13

## 2024-09-20 RX ADMIN — FOLIC ACID 1 MG: 1 TABLET ORAL at 09:31

## 2024-09-20 RX ADMIN — HYDROMORPHONE HYDROCHLORIDE 1 MG: 1 INJECTION, SOLUTION INTRAMUSCULAR; INTRAVENOUS; SUBCUTANEOUS at 04:22

## 2024-09-20 RX ADMIN — HYDROMORPHONE HYDROCHLORIDE 1.5 MG: 2 INJECTION, SOLUTION INTRAMUSCULAR; INTRAVENOUS; SUBCUTANEOUS at 12:04

## 2024-09-20 RX ADMIN — PSEUDOEPHEDRINE HCL 60 MG: 30 TABLET, FILM COATED ORAL at 09:31

## 2024-09-20 RX ADMIN — HYDROMORPHONE HYDROCHLORIDE 1 MG: 1 INJECTION, SOLUTION INTRAMUSCULAR; INTRAVENOUS; SUBCUTANEOUS at 00:31

## 2024-09-20 RX ADMIN — BACLOFEN 5 MG: 10 TABLET ORAL at 20:27

## 2024-09-20 RX ADMIN — SENNOSIDES AND DOCUSATE SODIUM 2 TABLET: 8.6; 5 TABLET ORAL at 20:48

## 2024-09-20 RX ADMIN — PSEUDOEPHEDRINE HCL 60 MG: 30 TABLET, FILM COATED ORAL at 00:26

## 2024-09-20 RX ADMIN — GABAPENTIN 100 MG: 100 CAPSULE ORAL at 15:31

## 2024-09-20 RX ADMIN — GABAPENTIN 100 MG: 100 CAPSULE ORAL at 00:26

## 2024-09-20 RX ADMIN — Medication 1 L/MIN: at 20:48

## 2024-09-20 RX ADMIN — POLYETHYLENE GLYCOL 3350 17 G: 17 POWDER, FOR SOLUTION ORAL at 09:30

## 2024-09-20 RX ADMIN — Medication 2 L/MIN: at 10:15

## 2024-09-20 RX ADMIN — BACLOFEN 5 MG: 10 TABLET ORAL at 15:31

## 2024-09-20 RX ADMIN — GABAPENTIN 100 MG: 100 CAPSULE ORAL at 09:30

## 2024-09-20 RX ADMIN — KETOCONAZOLE 200 MG: 200 TABLET ORAL at 10:27

## 2024-09-20 RX ADMIN — PANTOPRAZOLE SODIUM 20 MG: 20 TABLET, DELAYED RELEASE ORAL at 09:30

## 2024-09-20 RX ADMIN — HYDROMORPHONE HYDROCHLORIDE 2 MG: 2 INJECTION, SOLUTION INTRAMUSCULAR; INTRAVENOUS; SUBCUTANEOUS at 18:00

## 2024-09-20 RX ADMIN — KETOCONAZOLE 200 MG: 200 TABLET ORAL at 00:26

## 2024-09-20 RX ADMIN — BACLOFEN 5 MG: 10 TABLET ORAL at 09:31

## 2024-09-20 RX ADMIN — KETOCONAZOLE 200 MG: 200 TABLET ORAL at 20:27

## 2024-09-20 RX ADMIN — HYDROMORPHONE HYDROCHLORIDE 1.5 MG: 2 INJECTION, SOLUTION INTRAMUSCULAR; INTRAVENOUS; SUBCUTANEOUS at 09:30

## 2024-09-20 RX ADMIN — PREDNISONE 5 MG: 10 TABLET ORAL at 09:31

## 2024-09-20 RX ADMIN — HYDROMORPHONE HYDROCHLORIDE 2 MG: 2 INJECTION, SOLUTION INTRAMUSCULAR; INTRAVENOUS; SUBCUTANEOUS at 20:27

## 2024-09-20 ASSESSMENT — PAIN SCALES - GENERAL
PAINLEVEL_OUTOF10: 7
PAINLEVEL_OUTOF10: 8
PAINLEVEL_OUTOF10: 8
PAINLEVEL_OUTOF10: 7
PAINLEVEL_OUTOF10: 7
PAINLEVEL_OUTOF10: 8
PAINLEVEL_OUTOF10: 9
PAINLEVEL_OUTOF10: 8
PAINLEVEL_OUTOF10: 7
PAINLEVEL_OUTOF10: 8
PAINLEVEL_OUTOF10: 7
PAINLEVEL_OUTOF10: 10 - WORST POSSIBLE PAIN

## 2024-09-20 ASSESSMENT — COGNITIVE AND FUNCTIONAL STATUS - GENERAL
DAILY ACTIVITIY SCORE: 24
MOBILITY SCORE: 24

## 2024-09-20 ASSESSMENT — PAIN - FUNCTIONAL ASSESSMENT
PAIN_FUNCTIONAL_ASSESSMENT: 0-10

## 2024-09-20 NOTE — PROGRESS NOTES
09/16/24 1000   Discharge Planning   Who is requesting discharge planning? Provider   Home or Post Acute Services None   Expected Discharge Disposition Home   Does the patient need discharge transport arranged? No     9/16/24 @ 1050  Patient admitted for chest and penile pain due to sickle cell crisis. Urology following patient. Patient to get exchanged today per team. Anticipate no needs at discharge. Will continue to follow patient during his hospital stay for any needs that may arise.  Mari Ko RN TCC    9/19/24 @ 1155  Patient to get exchanged today. At this time, patient remains no needs at discharge. Will continue to follow.  MARIELA Lo    9/20/24 @ 1220  Patient active with [employer] for his home 3L nocturnal oxygen.  He has no portability. Per team, patient using oxygen during the day during this stay. CARLYLE Arana NP to order walking pulse ox prior to discharge to see if patient qualifies for continuous oxygen. Referral placed in [facility] to [facility].  Will continue to follow patient. ADOD this weekend.    UPDATE 1225:  Per [facility], patient is active for 2L cont as of 6/25/24. CARLYLE Arana NP made aware.  Mari Ko RN TCC

## 2024-09-20 NOTE — PROGRESS NOTES
Joellen Narayan is a 23 y.o. male on day 7 of admission presenting with Sickle cell crisis (Multi).    Subjective   Seen at bedside this morning. States that groin pain is slightly worse than yesterday. Upon further discussion, patient states that the base of right shaft is throbbing and causing him pain. His penis is bendable but seems slightly larger compared to yesterday with no identified area of warmth, redness, swelling or drainage. We discussed talking to urology again today for further interventions. He also states that he has been dizzy, nauseous, and fatigued since RBCex yesterday. Discussed getting orthos this morning. Was not interested in changing PO to IV zofran. He denies any fevers/chills, SOB, or CP.     Later in morning, provider removed apheresis line at bedside. 5 minutes after line was pulled pt c/o chest and neck pain. VSS, stable on 2L O2. EKG NSR. Troponin ordered and pending. CXR ordered. Extra dose of IV dilaudid administered. Spoke with mom over the phone and she stated that he has a lot of anxiety related to interventions. Most likely anxiety provoked but will follow up on CXR and troponin. At the end of the interaction, CP was improving.     Objective     Physical Exam  Vitals reviewed.   Constitutional:       Appearance: Normal appearance.   HENT:      Head: Normocephalic and atraumatic.      Nose: Nose normal.      Mouth/Throat:      Mouth: Mucous membranes are moist.      Pharynx: Oropharynx is clear.   Eyes:      Extraocular Movements: Extraocular movements intact.      Pupils: Pupils are equal, round, and reactive to light.   Cardiovascular:      Rate and Rhythm: Normal rate and regular rhythm.      Pulses: Normal pulses.      Heart sounds: Normal heart sounds.   Pulmonary:      Effort: Pulmonary effort is normal.      Breath sounds: Normal breath sounds.      Comments: 2L NC   Abdominal:      General: Bowel sounds are normal.      Palpations: Abdomen is soft.   Genitourinary:      "Comments: Priapism. Bendable   Musculoskeletal:         General: Normal range of motion.   Skin:     General: Skin is warm.   Neurological:      General: No focal deficit present.      Mental Status: He is alert and oriented to person, place, and time. Mental status is at baseline.   Psychiatric:         Mood and Affect: Mood normal.         Behavior: Behavior normal.       Last Recorded Vitals  Blood pressure 115/63, pulse 67, temperature 36.5 °C (97.7 °F), temperature source Temporal, resp. rate 16, height 1.88 m (6' 2\"), weight 69.4 kg (153 lb), SpO2 100%.  Intake/Output last 3 Shifts:  I/O last 3 completed shifts:  In: 1989 (28.7 mL/kg) [Other:1989]  Out: 2708 (39 mL/kg) [Urine:700 (0.3 mL/kg/hr); Other:2008]  Weight: 69.4 kg     Relevant Results  Scheduled medications  baclofen, 5 mg, oral, TID  flutamide, 125 mg, oral, TID  folic acid, 1 mg, oral, Daily  gabapentin, 100 mg, oral, q8h REYNALDO  ketoconazole, 200 mg, oral, BID  ketorolac, 30 mg, intravenous, Once  metoclopramide, 10 mg, intravenous, Once  oxygen, , inhalation, Continuous - Inhalation  pantoprazole, 20 mg, oral, Daily before breakfast  polyethylene glycol, 17 g, oral, Daily  predniSONE, 5 mg, oral, Daily  pseudoephedrine, 60 mg, oral, q8h  sennosides-docusate sodium, 2 tablet, oral, BID    Continuous medications     PRN medications  PRN medications: butalbital-acetaminophen-caff, diphenhydrAMINE, HYDROmorphone, [Held by provider] HYDROmorphone, ondansetron ODT **OR** [DISCONTINUED] ondansetron, oxygen     This patient has a central line   Reason for the central line remaining today? Line unnecessary, will be removed today    Assessment/Plan   Assessment & Plan  Sickle cell crisis (Multi)  Joellen Narayan is a 23 y.o. 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 CXR (9/12) negative for acute cardiopulmonary process. Urology following patient for priapism, upon presentation, patient did not agree to bedside drainage and corporal blood gas. 9/13 started on Ketoconazole 200mg BID and prednisone 5mg daily per urology recs. Penile doppler US ordered 9/15 per urology, showing normal caliber and peak systolic velocities of b/l cavernosal arteries which have improved since prior. Hematology consulted on 9/13 give persistent priapism x1 month, recommend emergent RBCex. 9/13 plan for apheresis line placement in the ED followed by emergent RBCex, however, patient declined placement of line in ED on 9/13, plan for apheresis line placement with IR followed by RBCex on 9/16. On 9/16 he then again declined apheresis line placement and RBCEx. Pain was stable x2 days until again 9/18 pt endorsed significant groin pain, penis still bendable. Discussed with urology, pt to be re-evaluated. Tbili up to 8.0 (9/18), 11.1 (9/19); RUQ US with cholelithiasis without cholecystitis (9/18). Pt now amenable to RBCEx, s/p apheresis line 9/18 and s/p RBC exchange pt 9/19. On 9/20, lysis labs improving post exchange. Apheresis line removed, CXR and troponin pending d/t chest pain after line was removed. EKG NSR. Urology re-engaged on 9/20 for continued groin/shaft pain localized around site from recent urology intervention with minimal improvement clinically since RBCex; further recs pending. Discharge pending improvement in pain and stability of priapism.     # Priapism  - Presented to the ED with worsening priapism since 5pm (hadn't resolved from last admission)  - s/p recent admission 8/31-9/9, OR with urology for priapism drainage,  penile block, and corpora cavernosa irrigation  - Also, s/p scheduled Sudafed q8h and added ibuprofen, baclofen 5mg TID and gabapentin 100mg TID  - Penile doppler US while prior inpatient (9/1) showing small caliber of bilateral cavernosal arteries with lack of color flow in portions of both arteries, more on the right, c/w low-flow/ischemic priapism. Urology notified, s/p phenylephrine injection with urology 9/1 evening with partial resolution of priapism  - Reassessed by Urology 9/7/24 reevaluate penile appearance; firm but still bendable with no increased pain. Rec to continue management as prior and to maintain outpatient appointment on 9/10/24.  - Outpatient appointment 9/10 prescribed Flutamide 125mg TID but per parent, no pharmacy has this medication so it was not started  - Doppler U/S completed outpatient 9/10 noted arterial flow  - Urology following rec bedside drainage and corporal blood gas and phenylepinephine injection --patient refusing; also rec starting treatment with ketoconazole with prednisone, 9/15 rec penile doppler US    - Penile doppler US (9/15) per urology, showing normal caliber and peak systolic velocities of b/l cavernosal arteries which have improved since prior.   - Heme following and rec emergent RBCex in setting of prolonged priapism  - Continue home Sudafed q8h prn and added ibuprofen, Baclofen 5mg TID and Gabapentin 100mg TID  - 9/13 started on ketoconazole 200mg BID and Prednisone 5mg daily-per urology recs pt to be dc'd with this  - C. Trachomatis/N. Gonorrhoeae (9/14): negative   - Initially planned for aphesis line placement followed by RBCex on 9/13, however, patient declined placement of apheresis line in the ED, primary, hematology and urology team explained risks to patient and he continued to decline   - Planed for apheresis line placement in IR on 9/16 followed by RBCex - hematology, Dr. Cruz, and trransfusion med team aware--> however on 9/16 pt declined apheresis line  placement and RBCEx again  - 9/16 pt was educated on risks of refusing/delaying medical care and recurrent priapism  - Pain was stable x2 days until again 9/18 pt endorsed significant groin pain, penis still bendable. Discussed with urology, pt to be re-evaluated.   - RUQ US with cholelithiasis without cholecystitis (9/18)   - Pt amenable to RBCEx, IR placed apheresis line 9/18 and s/p RBCex 9/19   - Tbili increased to 8.0 (9/18) --> 11.1 (9/19) --> 6.3 (9/20) s/p RBCex   - On 9/19, patient reporting slight improvement of groin pain, mild RLQ tenderness  - Urology re-engaged on 9/20 for continued groin/shaft pain localized around site from recent urology intervention with minimal improvement clinically since RBCex; further recs pending      # Hgb SS with severe pain   - OARRS reviewed, no aberrancies  - No current care path  - Hgb baseline ~8.5; currently stable, no indication for simple transfusion   - Tbili baseline fluctuates ~5-1; Tbili 4.4 on 9/16, up to 6.0 on 9/17  - LDH baseline ~500   on 9/16  - s/p IV Dilaudid 2mg q2hrs PRN severe pain (9/12-9/16)  - 9/16 weaned pain meds to IV dilaudid 1mg q3hrs PRN severe pain  - Overnight patient lost IV access, refused subcutaneous dilaudid but since pain is improving agreeable to start 20mg PO oxycodone Q4 hours for severe pain (9/19)   - Continue folic acid 1mg daily  - Hgb S (9/12): 59.8%   - Repeat Hgb S (9/18): 59.9%  - Hgb S post exchange 19.5%   - Apheresis line removed, CXR and troponin pending d/t chest pain after line was removed. EKG NSR.   - CXR (9/20); pending   - Troponin (9/20); pending   - Heme following (see above)  - PO Zofran PRN for opioid-induced nausea, PO Benadryl PRN for opioid-induced pruritus, Bowel regimen for opioid-induced constipation with DocuSenna 2tabs BID and Miralax daily      # Nodular lymphocyte predominant Hodgkins lymphoma (NLPHL)   - Primary Oncologist: Dr. Stoll  - Enlarged lymph nodes noted 4/1/22  - RUQ US (11/14/22)  with mildly enlarged LNs in the region of the kavin hepatis  - MRI liver (11/16/22) showed re-demonstration of bulky retroperitoneal lymphadenopathy and kavin hepatic lymphadenopathy    - (11/18/22) lymph node biopsy showed atypical lymphoid infiltrate. Reviewed by Hemepath board, insufficient for lymphoma diagnosis  - PET/CT (12/6/22) showing retroperitoneal lymphadenopathy  - Followed up with Dr. Stoll (12/16/22) with plan for surg/onc consult for large tissue bx but patient missed apt and was lost to follow up  - Requested new apt with Dr. Ronnie Marte 6/19/23, patient was not seen and lost to follow up  - CT a/p (11/28/23) increased size of retroperitoneal lymph nodes reflecting extramedullary hematopoiesis I/s/o sickle cell vs lymphoma  - Paraaortic LN bx via IR (11/30/23) consistent with NLPHL. Flow: no clonal B cell or T cell population identified, lymphocyte 95%, CD3+CD4+ 68%, CD3+CD8+ 7%, CD19+ 24%  - Elevated LDH/bili partially from sickle cell disease   - Chemotherapy (R-CHOP) was discussed with primary oncologist Dr. Stoll, and decision was made to simplify his chemotherapy to Rituxan and prednisone q3 weeks mainly due to frequent sickle cell crisis  - Current chemo regimen: Rituxan and prednisone q3 weeks (C1 1/18/24, C2 2/8/24, Missed C3 d/t sickle cell pain crisis, C4 4/4/24, C5 5/16/24, C6 6/7/24)  - Per pt no longer taking Acyclovir 400mg BID prophy   - PET CT (7/25) overall showing great response to treatment with persistent residual viable disease involving a left common iliac node  - Last FUV with Dr. Stoll 7/25/24 rec RTC in 2 months (next FUV scheduled for 9/19)     # Choledocholithiasis  - s/p ERCP 7/18/24 with a biliary sphincterotomy where sludge and stones were removed, achieving complete clearance  - Seen by gen surg 8/8/24 Dr. Dove who rec lap cholecystectomy d/t the chance of recurrence of choledocholithiasis  - Surgery has yet to be scheduled, has FUV with gen surg again 9/30  -  "Tbili 4.4 on 9/16, (recent peak at 52.8 prior to ERCP during recent hospital admission); increased to 8.9 (9/18) --> 11.1 (9/19) --> 6.3 (9/20) s/p RBCex   - RUQ US with cholelithiasis without cholecystitis (9/18)      # Hx of nocturnal oxygen dependence and hypoxia on room air  - Has not had home oxygen for 2-3 years   - Wean as able, encourage incentive spirometry  - Pulm FUV 8/8- \"Given his history of sickle cell disease, it is important to ensure he has formal sleep testing to look at desaturations and presence of sleep apnea despite not having a convincing history/body habitus typical for suspecting sleep apnea- patients with sickle cell have a higher prevalence of sleep disorders including nocturnal hypoxemia\"--> rec sleep referral for formal testing if deemed appropriate, ABG and O2 destat testing, and TTE with bubble study  - TTE 8/23 showing EF 60-65%, severely dilated LV and LA, + intrapulmonary shunting    - Walking pulse ox ordered 9/20 given intermittent oxygen requirements throughout admission      DVT prophy: Lovenox subcutaneous, SCDs, encourage ambulation     DISPO:  - Full code, confirmed on admit  - Discharge pending improvement in pain and stability of priapism and RBCex   - ANDREYK Melissa Narayan (Parent) [phone]  - Sickle cell FUV 9/18 (pt to reschedule on own), Dr. Stoll FUV 9/19, Gen Surg FUV 9/30, Urology FUV 10/7      I spent >60 minutes in the professional and overall care of this patient.    Assessment and plan discussed with attending physician Dr. Bren Aarna, APRN-CNP  "

## 2024-09-20 NOTE — ASSESSMENT & PLAN NOTE
Joellen Narayan is a 23 y.o. 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 CXR (9/12) negative for acute cardiopulmonary process. Urology following patient for priapism, upon presentation, patient did not agree to bedside drainage and corporal blood gas. 9/13 started on Ketoconazole 200mg BID and prednisone 5mg daily per urology recs. Penile doppler US ordered 9/15 per urology, showing normal caliber and peak systolic velocities of b/l cavernosal arteries which have improved since prior. Hematology consulted on 9/13 give persistent priapism x1 month, recommend emergent RBCex. 9/13 plan for apheresis line placement in the ED followed by emergent RBCex, however, patient declined placement of line in ED on 9/13, plan for apheresis line placement with IR followed by RBCex on 9/16. On 9/16 he then again declined apheresis line placement and RBCEx. Pain was stable x2 days until again 9/18 pt endorsed significant groin pain, penis still bendable. Discussed with urology, pt to be re-evaluated. Tbili up to 8.0 (9/18), 11.1 (9/19); RUQ US with cholelithiasis without cholecystitis (9/18). Pt now amenable to RBCEx, s/p apheresis line 9/18 and s/p RBC exchange pt 9/19. On 9/20, lysis labs improving post exchange. Apheresis line removed, CXR and troponin pending d/t chest pain after line was removed. EKG NSR. Urology re-engaged on 9/20 for continued groin/shaft pain localized around site from recent urology intervention with minimal improvement clinically since RBCex; further recs  pending. Discharge pending improvement in pain and stability of priapism.     # Priapism  - Presented to the ED with worsening priapism since 5pm (hadn't resolved from last admission)  - s/p recent admission 8/31-9/9, OR with urology for priapism drainage, penile block, and corpora cavernosa irrigation  - Also, s/p scheduled Sudafed q8h and added ibuprofen, baclofen 5mg TID and gabapentin 100mg TID  - Penile doppler US while prior inpatient (9/1) showing small caliber of bilateral cavernosal arteries with lack of color flow in portions of both arteries, more on the right, c/w low-flow/ischemic priapism. Urology notified, s/p phenylephrine injection with urology 9/1 evening with partial resolution of priapism  - Reassessed by Urology 9/7/24 reevaluate penile appearance; firm but still bendable with no increased pain. Rec to continue management as prior and to maintain outpatient appointment on 9/10/24.  - Outpatient appointment 9/10 prescribed Flutamide 125mg TID but per parent, no pharmacy has this medication so it was not started  - Doppler U/S completed outpatient 9/10 noted arterial flow  - Urology following rec bedside drainage and corporal blood gas and phenylepinephine injection --patient refusing; also rec starting treatment with ketoconazole with prednisone, 9/15 rec penile doppler US    - Penile doppler US (9/15) per urology, showing normal caliber and peak systolic velocities of b/l cavernosal arteries which have improved since prior.   - Heme following and rec emergent RBCex in setting of prolonged priapism  - Continue home Sudafed q8h prn and added ibuprofen, Baclofen 5mg TID and Gabapentin 100mg TID  - 9/13 started on ketoconazole 200mg BID and Prednisone 5mg daily-per urology recs pt to be dc'd with this  - C. Trachomatis/N. Gonorrhoeae (9/14): negative   - Initially planned for aphesis line placement followed by RBCex on 9/13, however, patient declined placement of apheresis line in the ED, primary,  hematology and urology team explained risks to patient and he continued to decline   - Planed for apheresis line placement in IR on 9/16 followed by RBCex - hematology, Dr. Cruz, and trransfusion med team aware--> however on 9/16 pt declined apheresis line placement and RBCEx again  - 9/16 pt was educated on risks of refusing/delaying medical care and recurrent priapism  - Pain was stable x2 days until again 9/18 pt endorsed significant groin pain, penis still bendable. Discussed with urology, pt to be re-evaluated.   - RUQ US with cholelithiasis without cholecystitis (9/18)   - Pt amenable to RBCEx, IR placed apheresis line 9/18 and s/p RBCex 9/19   - Tbili increased to 8.0 (9/18) --> 11.1 (9/19) --> 6.3 (9/20) s/p RBCex   - On 9/19, patient reporting slight improvement of groin pain, mild RLQ tenderness  - Urology re-engaged on 9/20 for continued groin/shaft pain localized around site from recent urology intervention with minimal improvement clinically since RBCex; further recs pending      # Hgb SS with severe pain   - OARRS reviewed, no aberrancies  - No current care path  - Hgb baseline ~8.5; currently stable, no indication for simple transfusion   - Tbili baseline fluctuates ~5-1; Tbili 4.4 on 9/16, up to 6.0 on 9/17  - LDH baseline ~500   on 9/16  - s/p IV Dilaudid 2mg q2hrs PRN severe pain (9/12-9/16)  - 9/16 weaned pain meds to IV dilaudid 1mg q3hrs PRN severe pain  - Overnight patient lost IV access, refused subcutaneous dilaudid but since pain is improving agreeable to start 20mg PO oxycodone Q4 hours for severe pain (9/19)   - Continue folic acid 1mg daily  - Hgb S (9/12): 59.8%   - Repeat Hgb S (9/18): 59.9%  - Hgb S post exchange 19.5%   - Apheresis line removed, CXR and troponin pending d/t chest pain after line was removed. EKG NSR.   - CXR (9/20); pending   - Troponin (9/20); pending   - Heme following (see above)  - PO Zofran PRN for opioid-induced nausea, PO Benadryl PRN for opioid-induced  pruritus, Bowel regimen for opioid-induced constipation with DocuSenna 2tabs BID and Miralax daily      # Nodular lymphocyte predominant Hodgkins lymphoma (NLPHL)   - Primary Oncologist: Dr. Stoll  - Enlarged lymph nodes noted 4/1/22  - RUQ US (11/14/22) with mildly enlarged LNs in the region of the kavin hepatis  - MRI liver (11/16/22) showed re-demonstration of bulky retroperitoneal lymphadenopathy and kavin hepatic lymphadenopathy    - (11/18/22) lymph node biopsy showed atypical lymphoid infiltrate. Reviewed by Hemepath board, insufficient for lymphoma diagnosis  - PET/CT (12/6/22) showing retroperitoneal lymphadenopathy  - Followed up with Dr. Stoll (12/16/22) with plan for surg/onc consult for large tissue bx but patient missed apt and was lost to follow up  - Requested new apt with Dr. Ronnie Marte 6/19/23, patient was not seen and lost to follow up  - CT a/p (11/28/23) increased size of retroperitoneal lymph nodes reflecting extramedullary hematopoiesis I/s/o sickle cell vs lymphoma  - Paraaortic LN bx via IR (11/30/23) consistent with NLPHL. Flow: no clonal B cell or T cell population identified, lymphocyte 95%, CD3+CD4+ 68%, CD3+CD8+ 7%, CD19+ 24%  - Elevated LDH/bili partially from sickle cell disease   - Chemotherapy (R-CHOP) was discussed with primary oncologist Dr. Stoll, and decision was made to simplify his chemotherapy to Rituxan and prednisone q3 weeks mainly due to frequent sickle cell crisis  - Current chemo regimen: Rituxan and prednisone q3 weeks (C1 1/18/24, C2 2/8/24, Missed C3 d/t sickle cell pain crisis, C4 4/4/24, C5 5/16/24, C6 6/7/24)  - Per pt no longer taking Acyclovir 400mg BID prophy   - PET CT (7/25) overall showing great response to treatment with persistent residual viable disease involving a left common iliac node  - Last FUV with Dr. Stoll 7/25/24 rec RTC in 2 months (next FUV scheduled for 9/19)     # Choledocholithiasis  - s/p ERCP 7/18/24 with a biliary sphincterotomy  "where sludge and stones were removed, achieving complete clearance  - Seen by gen surg 8/8/24 Dr. Dove who rec lap cholecystectomy d/t the chance of recurrence of choledocholithiasis  - Surgery has yet to be scheduled, has FUV with gen surg again 9/30  - Tbili 4.4 on 9/16, (recent peak at 52.8 prior to ERCP during recent hospital admission); increased to 8.9 (9/18) --> 11.1 (9/19) --> 6.3 (9/20) s/p RBCex   - RUQ US with cholelithiasis without cholecystitis (9/18)      # Hx of nocturnal oxygen dependence and hypoxia on room air  - Has not had home oxygen for 2-3 years   - Wean as able, encourage incentive spirometry  - Pulm FUV 8/8- \"Given his history of sickle cell disease, it is important to ensure he has formal sleep testing to look at desaturations and presence of sleep apnea despite not having a convincing history/body habitus typical for suspecting sleep apnea- patients with sickle cell have a higher prevalence of sleep disorders including nocturnal hypoxemia\"--> rec sleep referral for formal testing if deemed appropriate, ABG and O2 destat testing, and TTE with bubble study  - TTE 8/23 showing EF 60-65%, severely dilated LV and LA, + intrapulmonary shunting    - Walking pulse ox ordered 9/20 given intermittent oxygen requirements throughout admission      DVT prophy: Lovenox subcutaneous, SCDs, encourage ambulation     DISPO:  - Full code, confirmed on admit  - Discharge pending improvement in pain and stability of priapism and RBCex   - SUSIE Narayan (Parent) [phone]  - Sickle cell FUV 9/18 (pt to reschedule on own), Dr. Stoll FUV 9/19, Gen Surg FUV 9/30, Urology FUV 10/7  "

## 2024-09-21 LAB
ALBUMIN SERPL BCP-MCNC: 4.2 G/DL (ref 3.4–5)
ALP SERPL-CCNC: 150 U/L (ref 33–120)
ALT SERPL W P-5'-P-CCNC: 29 U/L (ref 10–52)
ANION GAP SERPL CALC-SCNC: 12 MMOL/L (ref 10–20)
AST SERPL W P-5'-P-CCNC: 40 U/L (ref 9–39)
BASOPHILS # BLD MANUAL: 0 X10*3/UL (ref 0–0.1)
BASOPHILS NFR BLD MANUAL: 0 %
BILIRUB SERPL-MCNC: 4.5 MG/DL (ref 0–1.2)
BUN SERPL-MCNC: 5 MG/DL (ref 6–23)
BURR CELLS BLD QL SMEAR: ABNORMAL
CALCIUM SERPL-MCNC: 9.2 MG/DL (ref 8.6–10.6)
CHLORIDE SERPL-SCNC: 104 MMOL/L (ref 98–107)
CO2 SERPL-SCNC: 24 MMOL/L (ref 21–32)
CREAT SERPL-MCNC: 0.57 MG/DL (ref 0.5–1.3)
EGFRCR SERPLBLD CKD-EPI 2021: >90 ML/MIN/1.73M*2
EOSINOPHIL # BLD MANUAL: 0.5 X10*3/UL (ref 0–0.7)
EOSINOPHIL NFR BLD MANUAL: 4.3 %
ERYTHROCYTE [DISTWIDTH] IN BLOOD BY AUTOMATED COUNT: 20.6 % (ref 11.5–14.5)
GLUCOSE SERPL-MCNC: 81 MG/DL (ref 74–99)
HCT VFR BLD AUTO: 28.9 % (ref 41–52)
HGB BLD-MCNC: 10 G/DL (ref 13.5–17.5)
HGB RETIC QN: 33 PG (ref 28–38)
IMM GRANULOCYTES # BLD AUTO: 0.1 X10*3/UL (ref 0–0.7)
IMM GRANULOCYTES NFR BLD AUTO: 0.9 % (ref 0–0.9)
IMMATURE RETIC FRACTION: 35.9 %
LDH SERPL L TO P-CCNC: 307 U/L (ref 84–246)
LYMPHOCYTES # BLD MANUAL: 2.42 X10*3/UL (ref 1.2–4.8)
LYMPHOCYTES NFR BLD MANUAL: 20.9 %
MAGNESIUM SERPL-MCNC: 2.16 MG/DL (ref 1.6–2.4)
MCH RBC QN AUTO: 29.1 PG (ref 26–34)
MCHC RBC AUTO-ENTMCNC: 34.6 G/DL (ref 32–36)
MCV RBC AUTO: 84 FL (ref 80–100)
MONOCYTES # BLD MANUAL: 0.41 X10*3/UL (ref 0.1–1)
MONOCYTES NFR BLD MANUAL: 3.5 %
MYELOCYTES # BLD MANUAL: 0.2 X10*3/UL
MYELOCYTES NFR BLD MANUAL: 1.7 %
NEUTROPHILS # BLD MANUAL: 7.77 X10*3/UL (ref 1.2–7.7)
NEUTS BAND # BLD MANUAL: 0.3 X10*3/UL (ref 0–0.7)
NEUTS BAND NFR BLD MANUAL: 2.6 %
NEUTS SEG # BLD MANUAL: 7.47 X10*3/UL (ref 1.2–7)
NEUTS SEG NFR BLD MANUAL: 64.4 %
NRBC BLD-RTO: 4.2 /100 WBCS (ref 0–0)
PLATELET # BLD AUTO: 283 X10*3/UL (ref 150–450)
POTASSIUM SERPL-SCNC: 4.2 MMOL/L (ref 3.5–5.3)
PROT SERPL-MCNC: 6.4 G/DL (ref 6.4–8.2)
RBC # BLD AUTO: 3.44 X10*6/UL (ref 4.5–5.9)
RBC MORPH BLD: ABNORMAL
RETICS #: 0.43 X10*6/UL (ref 0.02–0.12)
RETICS/RBC NFR AUTO: 12.7 % (ref 0.5–2)
SCHISTOCYTES BLD QL SMEAR: ABNORMAL
SODIUM SERPL-SCNC: 136 MMOL/L (ref 136–145)
TOTAL CELLS COUNTED BLD: 115
VARIANT LYMPHS # BLD MANUAL: 0.3 X10*3/UL (ref 0–0.5)
VARIANT LYMPHS NFR BLD: 2.6 %
WBC # BLD AUTO: 11.6 X10*3/UL (ref 4.4–11.3)

## 2024-09-21 PROCEDURE — 2500000001 HC RX 250 WO HCPCS SELF ADMINISTERED DRUGS (ALT 637 FOR MEDICARE OP)

## 2024-09-21 PROCEDURE — 83615 LACTATE (LD) (LDH) ENZYME: CPT

## 2024-09-21 PROCEDURE — 2500000005 HC RX 250 GENERAL PHARMACY W/O HCPCS: Performed by: PHYSICIAN ASSISTANT

## 2024-09-21 PROCEDURE — 2500000004 HC RX 250 GENERAL PHARMACY W/ HCPCS (ALT 636 FOR OP/ED)

## 2024-09-21 PROCEDURE — 85045 AUTOMATED RETICULOCYTE COUNT: CPT

## 2024-09-21 PROCEDURE — 80053 COMPREHEN METABOLIC PANEL: CPT

## 2024-09-21 PROCEDURE — 85027 COMPLETE CBC AUTOMATED: CPT

## 2024-09-21 PROCEDURE — 99233 SBSQ HOSP IP/OBS HIGH 50: CPT

## 2024-09-21 PROCEDURE — 2500000001 HC RX 250 WO HCPCS SELF ADMINISTERED DRUGS (ALT 637 FOR MEDICARE OP): Performed by: NURSE PRACTITIONER

## 2024-09-21 PROCEDURE — 2500000002 HC RX 250 W HCPCS SELF ADMINISTERED DRUGS (ALT 637 FOR MEDICARE OP, ALT 636 FOR OP/ED)

## 2024-09-21 PROCEDURE — 83735 ASSAY OF MAGNESIUM: CPT | Performed by: NURSE PRACTITIONER

## 2024-09-21 PROCEDURE — 1170000001 HC PRIVATE ONCOLOGY ROOM DAILY

## 2024-09-21 PROCEDURE — 85007 BL SMEAR W/DIFF WBC COUNT: CPT

## 2024-09-21 RX ADMIN — PSEUDOEPHEDRINE HCL 60 MG: 30 TABLET, FILM COATED ORAL at 10:47

## 2024-09-21 RX ADMIN — SENNOSIDES AND DOCUSATE SODIUM 2 TABLET: 8.6; 5 TABLET ORAL at 08:24

## 2024-09-21 RX ADMIN — METOCLOPRAMIDE 10 MG: 5 INJECTION, SOLUTION INTRAMUSCULAR; INTRAVENOUS at 18:48

## 2024-09-21 RX ADMIN — HYDROMORPHONE HYDROCHLORIDE 2.5 MG: 2 INJECTION, SOLUTION INTRAMUSCULAR; INTRAVENOUS; SUBCUTANEOUS at 18:45

## 2024-09-21 RX ADMIN — HYDROMORPHONE HYDROCHLORIDE 2 MG: 2 INJECTION, SOLUTION INTRAMUSCULAR; INTRAVENOUS; SUBCUTANEOUS at 00:17

## 2024-09-21 RX ADMIN — PSEUDOEPHEDRINE HCL 60 MG: 30 TABLET, FILM COATED ORAL at 16:52

## 2024-09-21 RX ADMIN — HYDROMORPHONE HYDROCHLORIDE 2.5 MG: 2 INJECTION, SOLUTION INTRAMUSCULAR; INTRAVENOUS; SUBCUTANEOUS at 23:22

## 2024-09-21 RX ADMIN — HYDROMORPHONE HYDROCHLORIDE 2 MG: 2 INJECTION, SOLUTION INTRAMUSCULAR; INTRAVENOUS; SUBCUTANEOUS at 04:50

## 2024-09-21 RX ADMIN — HYDROMORPHONE HYDROCHLORIDE 2.5 MG: 2 INJECTION, SOLUTION INTRAMUSCULAR; INTRAVENOUS; SUBCUTANEOUS at 13:00

## 2024-09-21 RX ADMIN — HYDROMORPHONE HYDROCHLORIDE 2 MG: 2 INJECTION, SOLUTION INTRAMUSCULAR; INTRAVENOUS; SUBCUTANEOUS at 02:50

## 2024-09-21 RX ADMIN — PANTOPRAZOLE SODIUM 20 MG: 20 TABLET, DELAYED RELEASE ORAL at 10:46

## 2024-09-21 RX ADMIN — GABAPENTIN 100 MG: 100 CAPSULE ORAL at 00:17

## 2024-09-21 RX ADMIN — HYDROMORPHONE HYDROCHLORIDE 2 MG: 2 INJECTION, SOLUTION INTRAMUSCULAR; INTRAVENOUS; SUBCUTANEOUS at 08:22

## 2024-09-21 RX ADMIN — GABAPENTIN 100 MG: 100 CAPSULE ORAL at 08:24

## 2024-09-21 RX ADMIN — FOLIC ACID 1 MG: 1 TABLET ORAL at 08:24

## 2024-09-21 RX ADMIN — BACLOFEN 5 MG: 10 TABLET ORAL at 21:12

## 2024-09-21 RX ADMIN — BACLOFEN 5 MG: 10 TABLET ORAL at 14:43

## 2024-09-21 RX ADMIN — BACLOFEN 5 MG: 10 TABLET ORAL at 08:24

## 2024-09-21 RX ADMIN — PSEUDOEPHEDRINE HCL 60 MG: 30 TABLET, FILM COATED ORAL at 00:17

## 2024-09-21 RX ADMIN — Medication 2 L/MIN: at 21:13

## 2024-09-21 RX ADMIN — HYDROMORPHONE HYDROCHLORIDE 2.5 MG: 2 INJECTION, SOLUTION INTRAMUSCULAR; INTRAVENOUS; SUBCUTANEOUS at 21:11

## 2024-09-21 RX ADMIN — PSEUDOEPHEDRINE HCL 60 MG: 30 TABLET, FILM COATED ORAL at 23:24

## 2024-09-21 RX ADMIN — HYDROMORPHONE HYDROCHLORIDE 2.5 MG: 2 INJECTION, SOLUTION INTRAMUSCULAR; INTRAVENOUS; SUBCUTANEOUS at 14:44

## 2024-09-21 RX ADMIN — HYDROMORPHONE HYDROCHLORIDE 2.5 MG: 2 INJECTION, SOLUTION INTRAMUSCULAR; INTRAVENOUS; SUBCUTANEOUS at 16:52

## 2024-09-21 RX ADMIN — PREDNISONE 5 MG: 10 TABLET ORAL at 08:24

## 2024-09-21 RX ADMIN — GABAPENTIN 100 MG: 100 CAPSULE ORAL at 23:22

## 2024-09-21 RX ADMIN — KETOCONAZOLE 200 MG: 200 TABLET ORAL at 08:23

## 2024-09-21 RX ADMIN — GABAPENTIN 100 MG: 100 CAPSULE ORAL at 16:52

## 2024-09-21 RX ADMIN — Medication 2 L/MIN: at 08:26

## 2024-09-21 RX ADMIN — HYDROMORPHONE HYDROCHLORIDE 2 MG: 2 INJECTION, SOLUTION INTRAMUSCULAR; INTRAVENOUS; SUBCUTANEOUS at 10:47

## 2024-09-21 ASSESSMENT — PAIN - FUNCTIONAL ASSESSMENT
PAIN_FUNCTIONAL_ASSESSMENT: 0-10

## 2024-09-21 ASSESSMENT — PAIN SCALES - GENERAL
PAINLEVEL_OUTOF10: 7
PAINLEVEL_OUTOF10: 7
PAINLEVEL_OUTOF10: 8
PAINLEVEL_OUTOF10: 7
PAINLEVEL_OUTOF10: 8
PAINLEVEL_OUTOF10: 7
PAINLEVEL_OUTOF10: 9
PAINLEVEL_OUTOF10: 8
PAINLEVEL_OUTOF10: 8
PAINLEVEL_OUTOF10: 7

## 2024-09-21 ASSESSMENT — PAIN DESCRIPTION - LOCATION
LOCATION: CHEST
LOCATION: CHEST

## 2024-09-21 ASSESSMENT — COGNITIVE AND FUNCTIONAL STATUS - GENERAL
MOBILITY SCORE: 24
DAILY ACTIVITIY SCORE: 24

## 2024-09-21 NOTE — PROGRESS NOTES
Joellen Narayan is a 23 y.o. male on day 8 of admission presenting with Sickle cell crisis (Multi).      Subjective   Pt remained to have chest pain overnight for which CXR was done and was unremkable, troponin and EKG were unremrkable as well, he is c/o similar chest, back and should pain this am, he stated that his penile pain is better today with pain at injection site only, he denied fever, chills, Sob, nausea or vomiting.      Objective     Last Recorded Vitals  /56 (BP Location: Left arm, Patient Position: Lying)   Pulse 66   Temp 36.3 °C (97.3 °F) (Temporal)   Resp 18   Wt 69.4 kg (153 lb)   SpO2 99%   Intake/Output last 3 Shifts:    Intake/Output Summary (Last 24 hours) at 9/21/2024 0724  Last data filed at 9/20/2024 2039  Gross per 24 hour   Intake 150 ml   Output 1225 ml   Net -1075 ml       Admission Weight  Weight: 69.4 kg (153 lb) (09/12/24 1939)    Daily Weight  09/12/24 : 69.4 kg (153 lb)    Image Results  XR chest 1 view  Narrative: STUDY:  XR CHEST 1 VIEW;  9/20/2024 6:34 pm      INDICATION:  Signs/Symptoms:sickle cell, increased pain after removal of trialysis  line today.      COMPARISON:  Chest radiograph 09/20/2024, CT abdomen/pelvis 08/23/2024.      ACCESSION NUMBER(S):  [id number]      ORDERING CLINICIAN:  DAE LOPEZ      FINDINGS:  AP radiograph of the chest was provided.      MEDICAL DEVICES:  None.      CARDIOMEDIASTINAL SILHOUETTE:  Cardiomediastinal silhouette is normal in size and configuration.      LUNGS:  No pneumothorax, pleural effusion or focal consolidation.      ABDOMEN:  No remarkable upper abdominal findings.      BONES:  No acute osseous changes.      Impression: 1.  No evidence of acute cardiopulmonary process.      I personally reviewed the images/study and I agree with the findings  as stated by Dr. Lorne Martinez. This study was interpreted at  [facility], [place], [place].      MACRO:  None          Dictation workstation:    [id number]  XR chest 1 view  Narrative: Interpreted By:  Allyn Woods,   STUDY:  XR CHEST 1 VIEW;  9/20/2024 11:25 am      INDICATION:  Signs/Symptoms:r/o pneumothorax.          COMPARISON:  09/12/2024      ACCESSION NUMBER(S):  [id number]      ORDERING CLINICIAN:  DONNA MORGAN      FINDINGS:                  CARDIOMEDIASTINAL SILHOUETTE:  Cardiomediastinal silhouette is normal in size and configuration.      LUNGS:  Lungs are clear. No evidence of a pneumothorax. Costophrenic angles  are clear      ABDOMEN:  No remarkable upper abdominal findings.      BONES:  No acute osseous changes.      Impression: 1.  No evidence of acute cardiopulmonary process.              MACRO:  None      Signed by: Allyn Woods 9/20/2024 1:19 PM  Dictation workstation:   [id number]      Physical Exam  Vitals reviewed.   Constitutional:       Appearance: Normal appearance.   HENT:      Head: Normocephalic and atraumatic.      Nose: Nose normal.      Mouth/Throat:      Mouth: Mucous membranes are moist.      Pharynx: Oropharynx is clear.   Eyes:      Extraocular Movements: Extraocular movements intact.      Pupils: Pupils are equal, round, and reactive to light.   Cardiovascular:      Rate and Rhythm: Normal rate and regular rhythm.      Pulses: Normal pulses.      Heart sounds: Normal heart sounds.   Pulmonary:      Effort: Pulmonary effort is normal.      Breath sounds: Normal breath sounds.      Comments: 2L NC   Abdominal:      General: Bowel sounds are normal.      Palpations: Abdomen is soft.   Genitourinary:     Comments: Priapism. Bendable   Musculoskeletal:         General: Normal range of motion.   Skin:     General: Skin is warm.   Neurological:      General: No focal deficit present.      Mental Status: He is alert and oriented to person, place, and time. Mental status is at baseline.   Psychiatric:         Mood and Affect: Mood normal.         Behavior: Behavior normal.     Relevant Results  No current  facility-administered medications on file prior to encounter.     Current Outpatient Medications on File Prior to Encounter   Medication Sig Dispense Refill    baclofen (Lioresal) 5 mg tablet Take 1 tablet (5 mg) by mouth 3 times a day. 90 tablet 0    folic acid (Folvite) 1 mg tablet Take 1 tablet (1 mg) by mouth once daily for 28 days. 28 tablet 0    gabapentin (Neurontin) 100 mg capsule Take 1 capsule (100 mg) by mouth every 8 hours. 90 capsule 11    [] oxyCODONE (Roxicodone) 15 mg immediate release tablet Take 1 tablet (15 mg) by mouth every 6 hours if needed (Severe sickle cell pain) for up to 5 days. 20 tablet 0    pantoprazole (ProtoNix) 20 mg EC tablet Take 1 tablet (20 mg) by mouth once daily in the morning. Take before meals. Do not crush, chew, or split. 30 tablet 11    pseudoephedrine (Sudafed) 60 mg tablet Take 1 tablet (60 mg) by mouth every 8 hours if needed for congestion for up to 10 days. 30 tablet 0           Results from last 7 days   Lab Units 24  0820 24  0816 24  1153   WBC AUTO x10*3/uL 11.6* 15.2* 11.6*   HEMOGLOBIN g/dL 10.0* 10.1* 10.2*   HEMATOCRIT % 28.9* 29.7* 29.7*   PLATELETS AUTO x10*3/uL 283 308 252        Results from last 7 days   Lab Units 24  0820 24  0816 24  0658   SODIUM mmol/L 136 137 135*   POTASSIUM mmol/L 4.2 3.8 3.7   CHLORIDE mmol/L 104 103 98   CO2 mmol/L 24 26 26   BUN mg/dL 5* 7 9   CREATININE mg/dL 0.57 0.69 0.72   EGFR mL/min/1.73m*2 >90 >90 >90   GLUCOSE mg/dL 81 82 79   CALCIUM mg/dL 9.2 9.1 10.0          Assessment & Plan  Sickle cell crisis (Multi)  Joellen Narayan is a 23 y.o. 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 CXR (9/12) negative for acute cardiopulmonary process. Urology following patient for priapism, upon presentation, patient did not agree to bedside drainage and corporal blood gas. 9/13 started on Ketoconazole 200mg BID and prednisone 5mg daily per urology recs. Penile doppler US ordered 9/15 per urology, showing normal caliber and peak systolic velocities of b/l cavernosal arteries which have improved since prior. Hematology consulted on 9/13 give persistent priapism x1 month, recommend emergent RBCex. 9/13 plan for apheresis line placement in the ED followed by emergent RBCex, however, patient declined placement of line in ED on 9/13, plan for apheresis line placement with IR followed by RBCex on 9/16. On 9/16 he then again declined apheresis line placement and RBCEx. Pain was stable x2 days until again 9/18 pt endorsed significant groin pain, penis still bendable. Discussed with urology, pt to be re-evaluated. Tbili up to 8.0 (9/18), 11.1 (9/19); RUQ US with cholelithiasis without cholecystitis (9/18). Pt now amenable to RBCEx, s/p apheresis line 9/18 and s/p RBC exchange pt 9/19. On 9/20, lysis labs improving post exchange. Apheresis line removed, EKG, CXR and troponin were unremarkable   (Ordered d/t chest pain after line was removed). Urology re-engaged on 9/20 for continued groin/shaft pain localized around site from recent urology intervention with minimal improvement clinically since RBCex; no interventions needed 09/21. Discharge pending improvement in pain and stability of priapism.     Updates 09/21:  -Hg stable 10.0, white count down trending to 11.6, Retic% 12.7 from 11.2%, T.Bili down to 4.5 from 6.3, LDH down to 307 from 336.   -Urology with no interventions needed for priapism (pain is improving)  -Continue with current pain control regimen.          # Priapism  - Presented to the ED with worsening priapism since 5pm (hadn't resolved from last admission)  - s/p recent admission 8/31-9/9, OR with urology for priapism drainage, penile block, and corpora cavernosa irrigation  - Also, s/p scheduled Sudafed q8h and added ibuprofen, baclofen 5mg TID and gabapentin 100mg TID  - Penile doppler US while prior inpatient (9/1) showing small caliber of bilateral cavernosal arteries with lack of color flow in portions of both arteries, more on the right, c/w low-flow/ischemic priapism. Urology notified, s/p phenylephrine injection with urology 9/1 evening with partial resolution of priapism  - Reassessed by Urology 9/7/24 reevaluate penile appearance; firm but still bendable with no increased pain. Rec to continue management as prior and to maintain outpatient appointment on 9/10/24.  - Outpatient appointment 9/10 prescribed Flutamide 125mg TID but per parent, no pharmacy has this medication so it was not started  - Doppler U/S completed outpatient 9/10 noted arterial flow  - Urology following rec bedside drainage and corporal blood gas and phenylepinephine injection --patient refusing; also rec starting treatment with ketoconazole with prednisone, 9/15 rec penile doppler US    - Penile doppler US (9/15) per urology, showing normal caliber and peak systolic velocities of b/l cavernosal arteries which have improved since prior.   - Heme following and rec emergent RBCex in setting of prolonged priapism  - Continue home Sudafed q8h prn and added ibuprofen, Baclofen 5mg TID and Gabapentin 100mg TID  - 9/13 started on ketoconazole 200mg BID and Prednisone 5mg daily-per urology recs pt to be dc'd with this  - C. Trachomatis/N. Gonorrhoeae (9/14): negative   - Initially planned for aphesis line placement followed by RBCex on 9/13, however, patient declined placement of apheresis line in the ED, primary, hematology and urology team explained risks to patient and he continued to  decline   - Planed for apheresis line placement in IR on 9/16 followed by RBCex - hematology, Dr. Cruz, and trransfusion med team aware--> however on 9/16 pt declined apheresis line placement and RBCEx again  - 9/16 pt was educated on risks of refusing/delaying medical care and recurrent priapism  - Pain was stable x2 days until again 9/18 pt endorsed significant groin pain, penis still bendable. Discussed with urology, pt to be re-evaluated.   - RUQ US with cholelithiasis without cholecystitis (9/18)   - Pt amenable to RBCEx, IR placed apheresis line 9/18 and s/p RBCex 9/19   - Tbili increased to 8.0 (9/18) --> 11.1 (9/19) --> 6.3 (9/20) s/p RBCex   - On 9/19, patient reporting slight improvement of groin pain, mild RLQ tenderness  - Urology re-engaged on 9/20 for continued groin/shaft pain localized around site from recent urology intervention with minimal improvement clinically since RBCex; further recs pending      # Hgb SS with severe pain   - OARRS reviewed, no aberrancies  - No current care path  - Hgb baseline ~8.5; currently stable, no indication for simple transfusion   - Tbili baseline fluctuates ~5-1; Tbili 4.4 on 9/16, up to 6.0 on 9/17  - LDH baseline ~500   on 9/16  - s/p IV Dilaudid 2mg q2hrs PRN severe pain (9/12-9/16)  - 9/16 weaned pain meds to IV dilaudid 1mg q3hrs PRN severe pain  - Overnight patient lost IV access, refused subcutaneous dilaudid but since pain is improving agreeable to start 20mg PO oxycodone Q4 hours for severe pain (9/19)   - Continue folic acid 1mg daily  - Hgb S (9/12): 59.8%   - Repeat Hgb S (9/18): 59.9%  - Hgb S post exchange 19.5%   - Apheresis line removed, CXR and troponin unremarkable (ordered d/t chest pain after line was removed. EKG NSR.   - Heme following (see above)  - PO Zofran PRN for opioid-induced nausea, PO Benadryl PRN for opioid-induced pruritus, Bowel regimen for opioid-induced constipation with DocuSenna 2tabs BID and Miralax daily      # Nodular  lymphocyte predominant Hodgkins lymphoma (NLPHL)   - Primary Oncologist: Dr. Stoll  - Enlarged lymph nodes noted 4/1/22  - RUQ US (11/14/22) with mildly enlarged LNs in the region of the kavin hepatis  - MRI liver (11/16/22) showed re-demonstration of bulky retroperitoneal lymphadenopathy and kavin hepatic lymphadenopathy    - (11/18/22) lymph node biopsy showed atypical lymphoid infiltrate. Reviewed by Hemepath board, insufficient for lymphoma diagnosis  - PET/CT (12/6/22) showing retroperitoneal lymphadenopathy  - Followed up with Dr. Stoll (12/16/22) with plan for surg/onc consult for large tissue bx but patient missed apt and was lost to follow up  - Requested new apt with Dr. Ronnie Marte 6/19/23, patient was not seen and lost to follow up  - CT a/p (11/28/23) increased size of retroperitoneal lymph nodes reflecting extramedullary hematopoiesis I/s/o sickle cell vs lymphoma  - Paraaortic LN bx via IR (11/30/23) consistent with NLPHL. Flow: no clonal B cell or T cell population identified, lymphocyte 95%, CD3+CD4+ 68%, CD3+CD8+ 7%, CD19+ 24%  - Elevated LDH/bili partially from sickle cell disease   - Chemotherapy (R-CHOP) was discussed with primary oncologist Dr. Stoll, and decision was made to simplify his chemotherapy to Rituxan and prednisone q3 weeks mainly due to frequent sickle cell crisis  - Current chemo regimen: Rituxan and prednisone q3 weeks (C1 1/18/24, C2 2/8/24, Missed C3 d/t sickle cell pain crisis, C4 4/4/24, C5 5/16/24, C6 6/7/24)  - Per pt no longer taking Acyclovir 400mg BID prophy   - PET CT (7/25) overall showing great response to treatment with persistent residual viable disease involving a left common iliac node  - Last FUV with Dr. Stoll 7/25/24 rec RTC in 2 months (next FUV scheduled for 9/19)     # Choledocholithiasis  - s/p ERCP 7/18/24 with a biliary sphincterotomy where sludge and stones were removed, achieving complete clearance  - Seen by gen surg 8/8/24 Dr. Dove who rec lap  "cholecystectomy d/t the chance of recurrence of choledocholithiasis  - Surgery has yet to be scheduled, has FUV with gen surg again 9/30  - Tbili 4.4 on 9/16, (recent peak at 52.8 prior to ERCP during recent hospital admission); increased to 8.9 (9/18) --> 11.1 (9/19) --> 6.3 (9/20) s/p RBCex   - RUQ US with cholelithiasis without cholecystitis (9/18)      # Hx of nocturnal oxygen dependence and hypoxia on room air  - Has not had home oxygen for 2-3 years   - Wean as able, encourage incentive spirometry  - Pulm FUV 8/8- \"Given his history of sickle cell disease, it is important to ensure he has formal sleep testing to look at desaturations and presence of sleep apnea despite not having a convincing history/body habitus typical for suspecting sleep apnea- patients with sickle cell have a higher prevalence of sleep disorders including nocturnal hypoxemia\"--> rec sleep referral for formal testing if deemed appropriate, ABG and O2 destat testing, and TTE with bubble study  - TTE 8/23 showing EF 60-65%, severely dilated LV and LA, + intrapulmonary shunting    - Walking pulse ox ordered 9/20 given intermittent oxygen requirements throughout admission      DVT prophy: Lovenox subcutaneous, SCDs, encourage ambulation     DISPO:  - Full code, confirmed on admit  - Discharge pending improvement in pain and stability of priapism and RBCex   - SUSIE Narayan (Parent) [phone]  - Sickle cell FUV 9/18 (pt to reschedule on own), Dr. Stoll FUV 9/19, Gen Surg FUV 9/30, Urology FUV 10/7      Clarissa Foley MD      "

## 2024-09-21 NOTE — ASSESSMENT & PLAN NOTE
Joellen Narayan is a 23 y.o. 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 CXR (9/12) negative for acute cardiopulmonary process. Urology following patient for priapism, upon presentation, patient did not agree to bedside drainage and corporal blood gas. 9/13 started on Ketoconazole 200mg BID and prednisone 5mg daily per urology recs. Penile doppler US ordered 9/15 per urology, showing normal caliber and peak systolic velocities of b/l cavernosal arteries which have improved since prior. Hematology consulted on 9/13 give persistent priapism x1 month, recommend emergent RBCex. 9/13 plan for apheresis line placement in the ED followed by emergent RBCex, however, patient declined placement of line in ED on 9/13, plan for apheresis line placement with IR followed by RBCex on 9/16. On 9/16 he then again declined apheresis line placement and RBCEx. Pain was stable x2 days until again 9/18 pt endorsed significant groin pain, penis still bendable. Discussed with urology, pt to be re-evaluated. Tbili up to 8.0 (9/18), 11.1 (9/19); RUQ US with cholelithiasis without cholecystitis (9/18). Pt now amenable to RBCEx, s/p apheresis line 9/18 and s/p RBC exchange pt 9/19. On 9/20, lysis labs improving post exchange. Apheresis line removed, EKG, CXR and troponin were unremarkable   (Ordered d/t chest pain after line was removed). Urology re-engaged on 9/20 for continued groin/shaft pain localized around site from recent urology intervention with minimal improvement clinically since  RBCex; no interventions needed 09/21. Discharge pending improvement in pain and stability of priapism.     Updates 09/21:  -Hg stable 10.0, white count down trending to 11.6, Retic% 12.7 from 11.2%, T.Bili down to 4.5 from 6.3, LDH down to 307 from 336.   -Urology with no interventions needed for priapism (pain is improving)  -Continue with current pain control regimen.         # Priapism  - Presented to the ED with worsening priapism since 5pm (hadn't resolved from last admission)  - s/p recent admission 8/31-9/9, OR with urology for priapism drainage, penile block, and corpora cavernosa irrigation  - Also, s/p scheduled Sudafed q8h and added ibuprofen, baclofen 5mg TID and gabapentin 100mg TID  - Penile doppler US while prior inpatient (9/1) showing small caliber of bilateral cavernosal arteries with lack of color flow in portions of both arteries, more on the right, c/w low-flow/ischemic priapism. Urology notified, s/p phenylephrine injection with urology 9/1 evening with partial resolution of priapism  - Reassessed by Urology 9/7/24 reevaluate penile appearance; firm but still bendable with no increased pain. Rec to continue management as prior and to maintain outpatient appointment on 9/10/24.  - Outpatient appointment 9/10 prescribed Flutamide 125mg TID but per parent, no pharmacy has this medication so it was not started  - Doppler U/S completed outpatient 9/10 noted arterial flow  - Urology following rec bedside drainage and corporal blood gas and phenylepinephine injection --patient refusing; also rec starting treatment with ketoconazole with prednisone, 9/15 rec penile doppler US    - Penile doppler US (9/15) per urology, showing normal caliber and peak systolic velocities of b/l cavernosal arteries which have improved since prior.   - Heme following and rec emergent RBCex in setting of prolonged priapism  - Continue home Sudafed q8h prn and added ibuprofen, Baclofen 5mg TID and Gabapentin 100mg TID  -  9/13 started on ketoconazole 200mg BID and Prednisone 5mg daily-per urology recs pt to be dc'd with this  - C. Trachomatis/N. Gonorrhoeae (9/14): negative   - Initially planned for aphesis line placement followed by RBCex on 9/13, however, patient declined placement of apheresis line in the ED, primary, hematology and urology team explained risks to patient and he continued to decline   - Planed for apheresis line placement in IR on 9/16 followed by RBCex - hematology, Dr. Cruz, and trransfusion med team aware--> however on 9/16 pt declined apheresis line placement and RBCEx again  - 9/16 pt was educated on risks of refusing/delaying medical care and recurrent priapism  - Pain was stable x2 days until again 9/18 pt endorsed significant groin pain, penis still bendable. Discussed with urology, pt to be re-evaluated.   - RUQ US with cholelithiasis without cholecystitis (9/18)   - Pt amenable to RBCEx, IR placed apheresis line 9/18 and s/p RBCex 9/19   - Tbili increased to 8.0 (9/18) --> 11.1 (9/19) --> 6.3 (9/20) s/p RBCex   - On 9/19, patient reporting slight improvement of groin pain, mild RLQ tenderness  - Urology re-engaged on 9/20 for continued groin/shaft pain localized around site from recent urology intervention with minimal improvement clinically since RBCex; further recs pending      # Hgb SS with severe pain   - OARRS reviewed, no aberrancies  - No current care path  - Hgb baseline ~8.5; currently stable, no indication for simple transfusion   - Tbili baseline fluctuates ~5-1; Tbili 4.4 on 9/16, up to 6.0 on 9/17  - LDH baseline ~500   on 9/16  - s/p IV Dilaudid 2mg q2hrs PRN severe pain (9/12-9/16)  - 9/16 weaned pain meds to IV dilaudid 1mg q3hrs PRN severe pain  - Overnight patient lost IV access, refused subcutaneous dilaudid but since pain is improving agreeable to start 20mg PO oxycodone Q4 hours for severe pain (9/19)   - Continue folic acid 1mg daily  - Hgb S (9/12): 59.8%   - Repeat Hgb S  (9/18): 59.9%  - Hgb S post exchange 19.5%   - Apheresis line removed, CXR and troponin unremarkable (ordered d/t chest pain after line was removed. EKG NSR.   - Heme following (see above)  - PO Zofran PRN for opioid-induced nausea, PO Benadryl PRN for opioid-induced pruritus, Bowel regimen for opioid-induced constipation with DocuSenna 2tabs BID and Miralax daily      # Nodular lymphocyte predominant Hodgkins lymphoma (NLPHL)   - Primary Oncologist: Dr. Stoll  - Enlarged lymph nodes noted 4/1/22  - RUQ US (11/14/22) with mildly enlarged LNs in the region of the kavin hepatis  - MRI liver (11/16/22) showed re-demonstration of bulky retroperitoneal lymphadenopathy and kavin hepatic lymphadenopathy    - (11/18/22) lymph node biopsy showed atypical lymphoid infiltrate. Reviewed by Hemepath board, insufficient for lymphoma diagnosis  - PET/CT (12/6/22) showing retroperitoneal lymphadenopathy  - Followed up with Dr. Stoll (12/16/22) with plan for surg/onc consult for large tissue bx but patient missed apt and was lost to follow up  - Requested new apt with Dr. Ronnie Marte 6/19/23, patient was not seen and lost to follow up  - CT a/p (11/28/23) increased size of retroperitoneal lymph nodes reflecting extramedullary hematopoiesis I/s/o sickle cell vs lymphoma  - Paraaortic LN bx via IR (11/30/23) consistent with NLPHL. Flow: no clonal B cell or T cell population identified, lymphocyte 95%, CD3+CD4+ 68%, CD3+CD8+ 7%, CD19+ 24%  - Elevated LDH/bili partially from sickle cell disease   - Chemotherapy (R-CHOP) was discussed with primary oncologist Dr. Stoll, and decision was made to simplify his chemotherapy to Rituxan and prednisone q3 weeks mainly due to frequent sickle cell crisis  - Current chemo regimen: Rituxan and prednisone q3 weeks (C1 1/18/24, C2 2/8/24, Missed C3 d/t sickle cell pain crisis, C4 4/4/24, C5 5/16/24, C6 6/7/24)  - Per pt no longer taking Acyclovir 400mg BID prophy   - PET CT (7/25) overall showing  "great response to treatment with persistent residual viable disease involving a left common iliac node  - Last FUV with Dr. Stoll 7/25/24 rec RTC in 2 months (next FUV scheduled for 9/19)     # Choledocholithiasis  - s/p ERCP 7/18/24 with a biliary sphincterotomy where sludge and stones were removed, achieving complete clearance  - Seen by gen surg 8/8/24 Dr. Dove who rec lap cholecystectomy d/t the chance of recurrence of choledocholithiasis  - Surgery has yet to be scheduled, has FUV with gen surg again 9/30  - Tbili 4.4 on 9/16, (recent peak at 52.8 prior to ERCP during recent hospital admission); increased to 8.9 (9/18) --> 11.1 (9/19) --> 6.3 (9/20) s/p RBCex   - RUQ US with cholelithiasis without cholecystitis (9/18)      # Hx of nocturnal oxygen dependence and hypoxia on room air  - Has not had home oxygen for 2-3 years   - Wean as able, encourage incentive spirometry  - Pulm FUV 8/8- \"Given his history of sickle cell disease, it is important to ensure he has formal sleep testing to look at desaturations and presence of sleep apnea despite not having a convincing history/body habitus typical for suspecting sleep apnea- patients with sickle cell have a higher prevalence of sleep disorders including nocturnal hypoxemia\"--> rec sleep referral for formal testing if deemed appropriate, ABG and O2 destat testing, and TTE with bubble study  - TTE 8/23 showing EF 60-65%, severely dilated LV and LA, + intrapulmonary shunting    - Walking pulse ox ordered 9/20 given intermittent oxygen requirements throughout admission      DVT prophy: Lovenox subcutaneous, SCDs, encourage ambulation     DISPO:  - Full code, confirmed on admit  - Discharge pending improvement in pain and stability of priapism and RBCex   - SUSIE Narayan (Parent) [phone]  - Sickle cell FUV 9/18 (pt to reschedule on own), Dr. Stoll FUV 9/19, Gen Surg FUV 9/30, Urology FUV 10/7  "

## 2024-09-22 VITALS
BODY MASS INDEX: 19.64 KG/M2 | SYSTOLIC BLOOD PRESSURE: 115 MMHG | TEMPERATURE: 97.3 F | RESPIRATION RATE: 16 BRPM | HEART RATE: 69 BPM | WEIGHT: 153 LBS | DIASTOLIC BLOOD PRESSURE: 57 MMHG | OXYGEN SATURATION: 99 % | HEIGHT: 74 IN

## 2024-09-22 LAB
ALBUMIN SERPL BCP-MCNC: 4.2 G/DL (ref 3.4–5)
ALP SERPL-CCNC: 124 U/L (ref 33–120)
ALT SERPL W P-5'-P-CCNC: 34 U/L (ref 10–52)
ANION GAP SERPL CALC-SCNC: 14 MMOL/L (ref 10–20)
AST SERPL W P-5'-P-CCNC: 37 U/L (ref 9–39)
ATRIAL RATE: 99 BPM
BASOPHILS # BLD AUTO: 0.02 X10*3/UL (ref 0–0.1)
BASOPHILS NFR BLD AUTO: 0.2 %
BILIRUB SERPL-MCNC: 4 MG/DL (ref 0–1.2)
BUN SERPL-MCNC: 6 MG/DL (ref 6–23)
CALCIUM SERPL-MCNC: 9.4 MG/DL (ref 8.6–10.6)
CHLORIDE SERPL-SCNC: 103 MMOL/L (ref 98–107)
CO2 SERPL-SCNC: 26 MMOL/L (ref 21–32)
CREAT SERPL-MCNC: 0.5 MG/DL (ref 0.5–1.3)
EGFRCR SERPLBLD CKD-EPI 2021: >90 ML/MIN/1.73M*2
EOSINOPHIL # BLD AUTO: 0.92 X10*3/UL (ref 0–0.7)
EOSINOPHIL NFR BLD AUTO: 9.4 %
ERYTHROCYTE [DISTWIDTH] IN BLOOD BY AUTOMATED COUNT: 20.1 % (ref 11.5–14.5)
GLUCOSE SERPL-MCNC: 75 MG/DL (ref 74–99)
HCT VFR BLD AUTO: 26.8 % (ref 41–52)
HGB BLD-MCNC: 9.2 G/DL (ref 13.5–17.5)
HGB RETIC QN: 34 PG (ref 28–38)
IMM GRANULOCYTES # BLD AUTO: 0.06 X10*3/UL (ref 0–0.7)
IMM GRANULOCYTES NFR BLD AUTO: 0.6 % (ref 0–0.9)
IMMATURE RETIC FRACTION: 28.7 %
LACTATE SERPL-SCNC: 1.2 MMOL/L (ref 0.4–2)
LDH SERPL L TO P-CCNC: 265 U/L (ref 84–246)
LIPASE SERPL-CCNC: 10 U/L (ref 9–82)
LYMPHOCYTES # BLD AUTO: 2.94 X10*3/UL (ref 1.2–4.8)
LYMPHOCYTES NFR BLD AUTO: 30.2 %
MAGNESIUM SERPL-MCNC: 2.16 MG/DL (ref 1.6–2.4)
MCH RBC QN AUTO: 29.2 PG (ref 26–34)
MCHC RBC AUTO-ENTMCNC: 34.3 G/DL (ref 32–36)
MCV RBC AUTO: 85 FL (ref 80–100)
MONOCYTES # BLD AUTO: 1.11 X10*3/UL (ref 0.1–1)
MONOCYTES NFR BLD AUTO: 11.4 %
NEUTROPHILS # BLD AUTO: 4.69 X10*3/UL (ref 1.2–7.7)
NEUTROPHILS NFR BLD AUTO: 48.2 %
NRBC BLD-RTO: 2.8 /100 WBCS (ref 0–0)
P AXIS: 65 DEGREES
P OFFSET: 190 MS
P ONSET: 139 MS
PAPPENHEIMER BOD BLD QL SMEAR: PRESENT
PLATELET # BLD AUTO: 374 X10*3/UL (ref 150–450)
POLYCHROMASIA BLD QL SMEAR: NORMAL
POTASSIUM SERPL-SCNC: 3.7 MMOL/L (ref 3.5–5.3)
PR INTERVAL: 154 MS
PROT SERPL-MCNC: 6.4 G/DL (ref 6.4–8.2)
Q ONSET: 216 MS
QRS COUNT: 16 BEATS
QRS DURATION: 104 MS
QT INTERVAL: 338 MS
QTC CALCULATION(BAZETT): 433 MS
QTC FREDERICIA: 399 MS
R AXIS: 92 DEGREES
RBC # BLD AUTO: 3.15 X10*6/UL (ref 4.5–5.9)
RBC MORPH BLD: NORMAL
RETICS #: 0.41 X10*6/UL (ref 0.02–0.12)
RETICS/RBC NFR AUTO: 13.2 % (ref 0.5–2)
SODIUM SERPL-SCNC: 139 MMOL/L (ref 136–145)
T AXIS: 0 DEGREES
T OFFSET: 385 MS
VENTRICULAR RATE: 99 BPM
WBC # BLD AUTO: 9.7 X10*3/UL (ref 4.4–11.3)

## 2024-09-22 PROCEDURE — 83605 ASSAY OF LACTIC ACID: CPT

## 2024-09-22 PROCEDURE — 1170000001 HC PRIVATE ONCOLOGY ROOM DAILY

## 2024-09-22 PROCEDURE — 85025 COMPLETE CBC W/AUTO DIFF WBC: CPT

## 2024-09-22 PROCEDURE — 36415 COLL VENOUS BLD VENIPUNCTURE: CPT

## 2024-09-22 PROCEDURE — 2500000001 HC RX 250 WO HCPCS SELF ADMINISTERED DRUGS (ALT 637 FOR MEDICARE OP): Performed by: NURSE PRACTITIONER

## 2024-09-22 PROCEDURE — 2500000004 HC RX 250 GENERAL PHARMACY W/ HCPCS (ALT 636 FOR OP/ED): Performed by: PHYSICIAN ASSISTANT

## 2024-09-22 PROCEDURE — 83735 ASSAY OF MAGNESIUM: CPT | Performed by: NURSE PRACTITIONER

## 2024-09-22 PROCEDURE — 2500000001 HC RX 250 WO HCPCS SELF ADMINISTERED DRUGS (ALT 637 FOR MEDICARE OP)

## 2024-09-22 PROCEDURE — 2500000001 HC RX 250 WO HCPCS SELF ADMINISTERED DRUGS (ALT 637 FOR MEDICARE OP): Performed by: PHYSICIAN ASSISTANT

## 2024-09-22 PROCEDURE — 2500000004 HC RX 250 GENERAL PHARMACY W/ HCPCS (ALT 636 FOR OP/ED)

## 2024-09-22 PROCEDURE — 2500000002 HC RX 250 W HCPCS SELF ADMINISTERED DRUGS (ALT 637 FOR MEDICARE OP, ALT 636 FOR OP/ED)

## 2024-09-22 PROCEDURE — 2500000005 HC RX 250 GENERAL PHARMACY W/O HCPCS: Performed by: PHYSICIAN ASSISTANT

## 2024-09-22 PROCEDURE — 85045 AUTOMATED RETICULOCYTE COUNT: CPT

## 2024-09-22 PROCEDURE — 80053 COMPREHEN METABOLIC PANEL: CPT

## 2024-09-22 PROCEDURE — 83615 LACTATE (LD) (LDH) ENZYME: CPT

## 2024-09-22 PROCEDURE — 2500000004 HC RX 250 GENERAL PHARMACY W/ HCPCS (ALT 636 FOR OP/ED): Performed by: NURSE PRACTITIONER

## 2024-09-22 PROCEDURE — 83690 ASSAY OF LIPASE: CPT

## 2024-09-22 RX ORDER — OXYCODONE HYDROCHLORIDE 10 MG/1
10 TABLET ORAL EVERY 2 HOUR PRN
Status: DISCONTINUED | OUTPATIENT
Start: 2024-09-22 | End: 2024-09-23

## 2024-09-22 RX ORDER — PANTOPRAZOLE SODIUM 40 MG/10ML
40 INJECTION, POWDER, LYOPHILIZED, FOR SOLUTION INTRAVENOUS DAILY
Status: DISCONTINUED | OUTPATIENT
Start: 2024-09-22 | End: 2024-09-28 | Stop reason: HOSPADM

## 2024-09-22 RX ORDER — OXYCODONE HYDROCHLORIDE 10 MG/1
10 TABLET ORAL EVERY 4 HOURS PRN
Status: DISCONTINUED | OUTPATIENT
Start: 2024-09-22 | End: 2024-09-22

## 2024-09-22 RX ADMIN — GABAPENTIN 100 MG: 100 CAPSULE ORAL at 08:53

## 2024-09-22 RX ADMIN — PREDNISONE 5 MG: 10 TABLET ORAL at 08:52

## 2024-09-22 RX ADMIN — HYDROMORPHONE HYDROCHLORIDE 2 MG: 2 INJECTION, SOLUTION INTRAMUSCULAR; INTRAVENOUS; SUBCUTANEOUS at 19:23

## 2024-09-22 RX ADMIN — HYDROMORPHONE HYDROCHLORIDE 2 MG: 2 INJECTION, SOLUTION INTRAMUSCULAR; INTRAVENOUS; SUBCUTANEOUS at 14:53

## 2024-09-22 RX ADMIN — OXYCODONE HYDROCHLORIDE 10 MG: 10 TABLET ORAL at 21:41

## 2024-09-22 RX ADMIN — Medication 21 L/MIN: at 21:40

## 2024-09-22 RX ADMIN — HYDROMORPHONE HYDROCHLORIDE 2.5 MG: 2 INJECTION, SOLUTION INTRAMUSCULAR; INTRAVENOUS; SUBCUTANEOUS at 04:00

## 2024-09-22 RX ADMIN — PSEUDOEPHEDRINE HCL 60 MG: 30 TABLET, FILM COATED ORAL at 16:17

## 2024-09-22 RX ADMIN — HYDROMORPHONE HYDROCHLORIDE 2.5 MG: 2 INJECTION, SOLUTION INTRAMUSCULAR; INTRAVENOUS; SUBCUTANEOUS at 12:43

## 2024-09-22 RX ADMIN — BACLOFEN 5 MG: 10 TABLET ORAL at 14:53

## 2024-09-22 RX ADMIN — FOLIC ACID 1 MG: 1 TABLET ORAL at 08:52

## 2024-09-22 RX ADMIN — GABAPENTIN 100 MG: 100 CAPSULE ORAL at 16:16

## 2024-09-22 RX ADMIN — HYDROMORPHONE HYDROCHLORIDE 2 MG: 2 INJECTION, SOLUTION INTRAMUSCULAR; INTRAVENOUS; SUBCUTANEOUS at 22:30

## 2024-09-22 RX ADMIN — POLYETHYLENE GLYCOL 3350 17 G: 17 POWDER, FOR SOLUTION ORAL at 08:56

## 2024-09-22 RX ADMIN — BACLOFEN 5 MG: 10 TABLET ORAL at 21:41

## 2024-09-22 RX ADMIN — KETOCONAZOLE 200 MG: 200 TABLET ORAL at 21:42

## 2024-09-22 RX ADMIN — HYDROMORPHONE HYDROCHLORIDE 2.5 MG: 2 INJECTION, SOLUTION INTRAMUSCULAR; INTRAVENOUS; SUBCUTANEOUS at 10:40

## 2024-09-22 RX ADMIN — PANTOPRAZOLE SODIUM 40 MG: 40 INJECTION, POWDER, FOR SOLUTION INTRAVENOUS at 12:43

## 2024-09-22 RX ADMIN — PANTOPRAZOLE SODIUM 20 MG: 20 TABLET, DELAYED RELEASE ORAL at 06:44

## 2024-09-22 RX ADMIN — SENNOSIDES AND DOCUSATE SODIUM 2 TABLET: 8.6; 5 TABLET ORAL at 08:53

## 2024-09-22 RX ADMIN — BACLOFEN 5 MG: 10 TABLET ORAL at 08:52

## 2024-09-22 RX ADMIN — PSEUDOEPHEDRINE HCL 60 MG: 30 TABLET, FILM COATED ORAL at 08:52

## 2024-09-22 RX ADMIN — HYDROMORPHONE HYDROCHLORIDE 2.5 MG: 2 INJECTION, SOLUTION INTRAMUSCULAR; INTRAVENOUS; SUBCUTANEOUS at 06:43

## 2024-09-22 RX ADMIN — HYDROMORPHONE HYDROCHLORIDE 2.5 MG: 2 INJECTION, SOLUTION INTRAMUSCULAR; INTRAVENOUS; SUBCUTANEOUS at 01:40

## 2024-09-22 RX ADMIN — SENNOSIDES AND DOCUSATE SODIUM 2 TABLET: 8.6; 5 TABLET ORAL at 21:41

## 2024-09-22 RX ADMIN — HYDROMORPHONE HYDROCHLORIDE 2.5 MG: 2 INJECTION, SOLUTION INTRAMUSCULAR; INTRAVENOUS; SUBCUTANEOUS at 08:49

## 2024-09-22 RX ADMIN — OXYCODONE HYDROCHLORIDE 10 MG: 10 TABLET ORAL at 17:05

## 2024-09-22 RX ADMIN — KETOCONAZOLE 200 MG: 200 TABLET ORAL at 00:33

## 2024-09-22 ASSESSMENT — PAIN SCALES - GENERAL
PAINLEVEL_OUTOF10: 7
PAINLEVEL_OUTOF10: 8
PAINLEVEL_OUTOF10: 8
PAINLEVEL_OUTOF10: 9
PAINLEVEL_OUTOF10: 8
PAINLEVEL_OUTOF10: 8
PAINLEVEL_OUTOF10: 7
PAINLEVEL_OUTOF10: 8
PAINLEVEL_OUTOF10: 7
PAINLEVEL_OUTOF10: 8
PAINLEVEL_OUTOF10: 7
PAINLEVEL_OUTOF10: 0 - NO PAIN
PAINLEVEL_OUTOF10: 8
PAINLEVEL_OUTOF10: 7
PAINLEVEL_OUTOF10: 6
PAINLEVEL_OUTOF10: 7
PAINLEVEL_OUTOF10: 7
PAINLEVEL_OUTOF10: 8

## 2024-09-22 ASSESSMENT — PAIN - FUNCTIONAL ASSESSMENT

## 2024-09-22 ASSESSMENT — PAIN DESCRIPTION - LOCATION
LOCATION: CHEST

## 2024-09-22 NOTE — CARE PLAN
The patient's goals for the shift include      The clinical goals for the shift include Pt will be safe, comfortable, and HD stable overnight.      Problem: Pain  Goal: Takes deep breaths with improved pain control throughout the shift  Outcome: Progressing  Goal: Turns in bed with improved pain control throughout the shift  Outcome: Progressing  Goal: Walks with improved pain control throughout the shift  Outcome: Progressing  Goal: Performs ADL's with improved pain control throughout shift  Outcome: Progressing  Goal: Participates in PT with improved pain control throughout the shift  Outcome: Progressing  Goal: Free from opioid side effects throughout the shift  Outcome: Progressing  Goal: Free from acute confusion related to pain meds throughout the shift  Outcome: Progressing     Problem: Fall/Injury  Goal: Not fall by end of shift  Outcome: Progressing  Goal: Be free from injury by end of the shift  Outcome: Progressing  Goal: Verbalize understanding of personal risk factors for fall in the hospital  Outcome: Progressing  Goal: Verbalize understanding of risk factor reduction measures to prevent injury from fall in the home  Outcome: Progressing  Goal: Use assistive devices by end of the shift  Outcome: Progressing  Goal: Pace activities to prevent fatigue by end of the shift  Outcome: Progressing

## 2024-09-22 NOTE — CARE PLAN
The patient's goals for the shift include      The clinical goals for the shift include Pt will remain safe in room and use call [facility] during shift on 9/22/24@ 1930

## 2024-09-22 NOTE — ASSESSMENT & PLAN NOTE
Joellen Narayan is a 23 y.o. 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 CXR (9/12) negative for acute cardiopulmonary process. Urology following patient for priapism, upon presentation, patient did not agree to bedside drainage and corporal blood gas. 9/13 started on Ketoconazole 200mg BID and prednisone 5mg daily per urology recs. Penile doppler US ordered 9/15 per urology, showing normal caliber and peak systolic velocities of b/l cavernosal arteries which have improved since prior. Hematology consulted on 9/13 give persistent priapism x1 month, recommend emergent RBCex. 9/13 plan for apheresis line placement in the ED followed by emergent RBCex, however, patient declined placement of line in ED on 9/13, plan for apheresis line placement with IR followed by RBCex on 9/16. On 9/16 he then again declined apheresis line placement and RBCEx. Pain was stable x2 days until again 9/18 pt endorsed significant groin pain, penis still bendable. Discussed with urology, pt to be re-evaluated. Tbili up to 8.0 (9/18), 11.1 (9/19); RUQ US with cholelithiasis without cholecystitis (9/18). Pt now amenable to RBCEx, s/p apheresis line 9/18 and s/p RBC exchange pt 9/19. On 9/20, lysis labs improving post exchange. Apheresis line removed, EKG, CXR and troponin were unremarkable   (Ordered d/t chest pain after line was removed). Urology re-engaged on 9/20 for continued groin/shaft pain localized around site from recent urology intervention with minimal improvement clinically since  RBCex; no interventions needed 09/21. Discharge pending improvement in pain and stability of priapism.     Updates 09/22:  -Follow up labs from today, add lipase and lactate (I/s/o epigastric tenderness with N/V).   -will start alternating between PO and IV opioids today.         # Priapism  - Presented to the ED with worsening priapism since 5pm (hadn't resolved from last admission)  - s/p recent admission 8/31-9/9, OR with urology for priapism drainage, penile block, and corpora cavernosa irrigation  - Also, s/p scheduled Sudafed q8h and added ibuprofen, baclofen 5mg TID and gabapentin 100mg TID  - Penile doppler US while prior inpatient (9/1) showing small caliber of bilateral cavernosal arteries with lack of color flow in portions of both arteries, more on the right, c/w low-flow/ischemic priapism. Urology notified, s/p phenylephrine injection with urology 9/1 evening with partial resolution of priapism  - Reassessed by Urology 9/7/24 reevaluate penile appearance; firm but still bendable with no increased pain. Rec to continue management as prior and to maintain outpatient appointment on 9/10/24.  - Outpatient appointment 9/10 prescribed Flutamide 125mg TID but per parent, no pharmacy has this medication so it was not started  - Doppler U/S completed outpatient 9/10 noted arterial flow  - Urology following rec bedside drainage and corporal blood gas and phenylepinephine injection --patient refusing; also rec starting treatment with ketoconazole with prednisone, 9/15 rec penile doppler US    - Penile doppler US (9/15) per urology, showing normal caliber and peak systolic velocities of b/l cavernosal arteries which have improved since prior.   - Heme following and rec emergent RBCex in setting of prolonged priapism  - Continue home Sudafed q8h prn and added ibuprofen, Baclofen 5mg TID and Gabapentin 100mg TID  - 9/13 started on ketoconazole 200mg BID and Prednisone 5mg daily-per urology recs pt to be dc'd with  this  - C. Trachomatis/N. Gonorrhoeae (9/14): negative   - Initially planned for aphesis line placement followed by RBCex on 9/13, however, patient declined placement of apheresis line in the ED, primary, hematology and urology team explained risks to patient and he continued to decline   - Planed for apheresis line placement in IR on 9/16 followed by RBCex - hematology, Dr. Cruz, and trransfusion med team aware--> however on 9/16 pt declined apheresis line placement and RBCEx again  - 9/16 pt was educated on risks of refusing/delaying medical care and recurrent priapism  - Pain was stable x2 days until again 9/18 pt endorsed significant groin pain, penis still bendable. Discussed with urology, pt to be re-evaluated.   - RUQ US with cholelithiasis without cholecystitis (9/18)   - Pt amenable to RBCEx, IR placed apheresis line 9/18 and s/p RBCex 9/19   - Tbili increased to 8.0 (9/18) --> 11.1 (9/19) --> 6.3 (9/20) s/p RBCex   - On 9/19, patient reporting slight improvement of groin pain, mild RLQ tenderness  - Urology re-engaged on 9/20 for continued groin/shaft pain localized around site from recent urology intervention with minimal improvement clinically since RBCex; further recs pending      # Hgb SS with severe pain   - OARRS reviewed, no aberrancies  - No current care path  - Hgb baseline ~8.5; currently stable, no indication for simple transfusion   - Tbili baseline fluctuates ~5-1; Tbili 4.4 on 9/16, up to 6.0 on 9/17  - LDH baseline ~500   on 9/16  - s/p IV Dilaudid 2mg q2hrs PRN severe pain (9/12-9/16)  - 9/16 weaned pain meds to IV dilaudid 1mg q3hrs PRN severe pain  - Overnight patient lost IV access, refused subcutaneous dilaudid but since pain is improving agreeable to start 20mg PO oxycodone Q4 hours for severe pain (9/19)   - Continue folic acid 1mg daily  - Hgb S (9/12): 59.8%   - Repeat Hgb S (9/18): 59.9%  - Hgb S post exchange 19.5%   - Apheresis line removed, CXR and troponin unremarkable  (ordered d/t chest pain after line was removed. EKG NSR.   - Heme following (see above)  - PO Zofran PRN for opioid-induced nausea, PO Benadryl PRN for opioid-induced pruritus, Bowel regimen for opioid-induced constipation with DocuSenna 2tabs BID and Miralax daily      # Nodular lymphocyte predominant Hodgkins lymphoma (NLPHL)   - Primary Oncologist: Dr. Stoll  - Enlarged lymph nodes noted 4/1/22  - RUQ US (11/14/22) with mildly enlarged LNs in the region of the kavin hepatis  - MRI liver (11/16/22) showed re-demonstration of bulky retroperitoneal lymphadenopathy and kavin hepatic lymphadenopathy    - (11/18/22) lymph node biopsy showed atypical lymphoid infiltrate. Reviewed by Hemepath board, insufficient for lymphoma diagnosis  - PET/CT (12/6/22) showing retroperitoneal lymphadenopathy  - Followed up with Dr. Stoll (12/16/22) with plan for surg/onc consult for large tissue bx but patient missed apt and was lost to follow up  - Requested new apt with Dr. Ronnie Marte 6/19/23, patient was not seen and lost to follow up  - CT a/p (11/28/23) increased size of retroperitoneal lymph nodes reflecting extramedullary hematopoiesis I/s/o sickle cell vs lymphoma  - Paraaortic LN bx via IR (11/30/23) consistent with NLPHL. Flow: no clonal B cell or T cell population identified, lymphocyte 95%, CD3+CD4+ 68%, CD3+CD8+ 7%, CD19+ 24%  - Elevated LDH/bili partially from sickle cell disease   - Chemotherapy (R-CHOP) was discussed with primary oncologist Dr. Stoll, and decision was made to simplify his chemotherapy to Rituxan and prednisone q3 weeks mainly due to frequent sickle cell crisis  - Current chemo regimen: Rituxan and prednisone q3 weeks (C1 1/18/24, C2 2/8/24, Missed C3 d/t sickle cell pain crisis, C4 4/4/24, C5 5/16/24, C6 6/7/24)  - Per pt no longer taking Acyclovir 400mg BID prophy   - PET CT (7/25) overall showing great response to treatment with persistent residual viable disease involving a left common iliac  "node  - Last FUV with Dr. Stoll 7/25/24 rec RTC in 2 months (next FUV scheduled for 9/19)     # Choledocholithiasis  - s/p ERCP 7/18/24 with a biliary sphincterotomy where sludge and stones were removed, achieving complete clearance  - Seen by gen surg 8/8/24 Dr. Dove who rec lap cholecystectomy d/t the chance of recurrence of choledocholithiasis  - Surgery has yet to be scheduled, has FUV with gen surg again 9/30  - Tbili 4.4 on 9/16, (recent peak at 52.8 prior to ERCP during recent hospital admission); increased to 8.9 (9/18) --> 11.1 (9/19) --> 6.3 (9/20) s/p RBCex   - RUQ US with cholelithiasis without cholecystitis (9/18)      # Hx of nocturnal oxygen dependence and hypoxia on room air  - Has not had home oxygen for 2-3 years   - Wean as able, encourage incentive spirometry  - Pulm FUV 8/8- \"Given his history of sickle cell disease, it is important to ensure he has formal sleep testing to look at desaturations and presence of sleep apnea despite not having a convincing history/body habitus typical for suspecting sleep apnea- patients with sickle cell have a higher prevalence of sleep disorders including nocturnal hypoxemia\"--> rec sleep referral for formal testing if deemed appropriate, ABG and O2 destat testing, and TTE with bubble study  - TTE 8/23 showing EF 60-65%, severely dilated LV and LA, + intrapulmonary shunting    - Walking pulse ox ordered 9/20 given intermittent oxygen requirements throughout admission      DVT prophy: Lovenox subcutaneous, SCDs, encourage ambulation     DISPO:  - Full code, confirmed on admit  - Discharge pending improvement in pain and stability of priapism and RBCex   - SUSIE Narayan (Parent) [phone]  - Sickle cell FUV 9/18 (pt to reschedule on own), Dr. Stoll FUV 9/19, Gen Surg FUV 9/30, Urology FUV 10/7  "

## 2024-09-23 ENCOUNTER — OFFICE VISIT (OUTPATIENT)
Dept: HEMATOLOGY/ONCOLOGY | Facility: HOSPITAL | Age: 24
DRG: 812 | End: 2024-09-23
Payer: COMMERCIAL

## 2024-09-23 LAB
ALBUMIN SERPL BCP-MCNC: 4.1 G/DL (ref 3.4–5)
ALP SERPL-CCNC: 141 U/L (ref 33–120)
ALT SERPL W P-5'-P-CCNC: 33 U/L (ref 10–52)
ANION GAP SERPL CALC-SCNC: 12 MMOL/L (ref 10–20)
AST SERPL W P-5'-P-CCNC: 41 U/L (ref 9–39)
BASOPHILS # BLD AUTO: 0.03 X10*3/UL (ref 0–0.1)
BASOPHILS NFR BLD AUTO: 0.3 %
BILIRUB SERPL-MCNC: 3.3 MG/DL (ref 0–1.2)
BUN SERPL-MCNC: 6 MG/DL (ref 6–23)
CALCIUM SERPL-MCNC: 9.1 MG/DL (ref 8.6–10.6)
CHLORIDE SERPL-SCNC: 104 MMOL/L (ref 98–107)
CO2 SERPL-SCNC: 26 MMOL/L (ref 21–32)
CREAT SERPL-MCNC: 0.58 MG/DL (ref 0.5–1.3)
EGFRCR SERPLBLD CKD-EPI 2021: >90 ML/MIN/1.73M*2
EOSINOPHIL # BLD AUTO: 0.82 X10*3/UL (ref 0–0.7)
EOSINOPHIL NFR BLD AUTO: 8.1 %
ERYTHROCYTE [DISTWIDTH] IN BLOOD BY AUTOMATED COUNT: 19.7 % (ref 11.5–14.5)
GLUCOSE SERPL-MCNC: 97 MG/DL (ref 74–99)
HCT VFR BLD AUTO: 28.5 % (ref 41–52)
HGB BLD-MCNC: 9.3 G/DL (ref 13.5–17.5)
HGB RETIC QN: 32 PG (ref 28–38)
IMM GRANULOCYTES # BLD AUTO: 0.06 X10*3/UL (ref 0–0.7)
IMM GRANULOCYTES NFR BLD AUTO: 0.6 % (ref 0–0.9)
IMMATURE RETIC FRACTION: 25.1 %
LDH SERPL L TO P-CCNC: 255 U/L (ref 84–246)
LYMPHOCYTES # BLD AUTO: 3.48 X10*3/UL (ref 1.2–4.8)
LYMPHOCYTES NFR BLD AUTO: 34.4 %
MAGNESIUM SERPL-MCNC: 2.11 MG/DL (ref 1.6–2.4)
MCH RBC QN AUTO: 28.4 PG (ref 26–34)
MCHC RBC AUTO-ENTMCNC: 32.6 G/DL (ref 32–36)
MCV RBC AUTO: 87 FL (ref 80–100)
MONOCYTES # BLD AUTO: 1.17 X10*3/UL (ref 0.1–1)
MONOCYTES NFR BLD AUTO: 11.5 %
NEUTROPHILS # BLD AUTO: 4.57 X10*3/UL (ref 1.2–7.7)
NEUTROPHILS NFR BLD AUTO: 45.1 %
NRBC BLD-RTO: 3.2 /100 WBCS (ref 0–0)
PLATELET # BLD AUTO: 310 X10*3/UL (ref 150–450)
POTASSIUM SERPL-SCNC: 3.7 MMOL/L (ref 3.5–5.3)
PROT SERPL-MCNC: 6.3 G/DL (ref 6.4–8.2)
RBC # BLD AUTO: 3.27 X10*6/UL (ref 4.5–5.9)
RETICS #: 0.44 X10*6/UL (ref 0.02–0.12)
RETICS/RBC NFR AUTO: 13.4 % (ref 0.5–2)
SODIUM SERPL-SCNC: 138 MMOL/L (ref 136–145)
WBC # BLD AUTO: 10.1 X10*3/UL (ref 4.4–11.3)

## 2024-09-23 PROCEDURE — 2500000005 HC RX 250 GENERAL PHARMACY W/O HCPCS

## 2024-09-23 PROCEDURE — 2500000004 HC RX 250 GENERAL PHARMACY W/ HCPCS (ALT 636 FOR OP/ED): Performed by: PHYSICIAN ASSISTANT

## 2024-09-23 PROCEDURE — 83615 LACTATE (LD) (LDH) ENZYME: CPT

## 2024-09-23 PROCEDURE — 83735 ASSAY OF MAGNESIUM: CPT | Performed by: NURSE PRACTITIONER

## 2024-09-23 PROCEDURE — 2500000001 HC RX 250 WO HCPCS SELF ADMINISTERED DRUGS (ALT 637 FOR MEDICARE OP): Performed by: NURSE PRACTITIONER

## 2024-09-23 PROCEDURE — 2500000001 HC RX 250 WO HCPCS SELF ADMINISTERED DRUGS (ALT 637 FOR MEDICARE OP): Performed by: PHYSICIAN ASSISTANT

## 2024-09-23 PROCEDURE — 85045 AUTOMATED RETICULOCYTE COUNT: CPT

## 2024-09-23 PROCEDURE — 93005 ELECTROCARDIOGRAM TRACING: CPT

## 2024-09-23 PROCEDURE — 2500000001 HC RX 250 WO HCPCS SELF ADMINISTERED DRUGS (ALT 637 FOR MEDICARE OP)

## 2024-09-23 PROCEDURE — 99221 1ST HOSP IP/OBS SF/LOW 40: CPT

## 2024-09-23 PROCEDURE — 99233 SBSQ HOSP IP/OBS HIGH 50: CPT

## 2024-09-23 PROCEDURE — 99223 1ST HOSP IP/OBS HIGH 75: CPT

## 2024-09-23 PROCEDURE — 2500000004 HC RX 250 GENERAL PHARMACY W/ HCPCS (ALT 636 FOR OP/ED): Performed by: NURSE PRACTITIONER

## 2024-09-23 PROCEDURE — 2500000004 HC RX 250 GENERAL PHARMACY W/ HCPCS (ALT 636 FOR OP/ED)

## 2024-09-23 PROCEDURE — 84075 ASSAY ALKALINE PHOSPHATASE: CPT

## 2024-09-23 PROCEDURE — 93010 ELECTROCARDIOGRAM REPORT: CPT | Performed by: INTERNAL MEDICINE

## 2024-09-23 PROCEDURE — 36415 COLL VENOUS BLD VENIPUNCTURE: CPT

## 2024-09-23 PROCEDURE — 1170000001 HC PRIVATE ONCOLOGY ROOM DAILY

## 2024-09-23 PROCEDURE — 2500000005 HC RX 250 GENERAL PHARMACY W/O HCPCS: Performed by: PHYSICIAN ASSISTANT

## 2024-09-23 PROCEDURE — 85025 COMPLETE CBC W/AUTO DIFF WBC: CPT

## 2024-09-23 RX ORDER — MORPHINE SULFATE 4 MG/ML
10 INJECTION INTRAVENOUS
Status: DISCONTINUED | OUTPATIENT
Start: 2024-09-23 | End: 2024-09-24

## 2024-09-23 RX ORDER — MORPHINE SULFATE 4 MG/ML
10 INJECTION INTRAVENOUS ONCE
Status: COMPLETED | OUTPATIENT
Start: 2024-09-23 | End: 2024-09-23

## 2024-09-23 RX ORDER — OXYCODONE HYDROCHLORIDE 5 MG/1
15 TABLET ORAL EVERY 2 HOUR PRN
Status: DISCONTINUED | OUTPATIENT
Start: 2024-09-23 | End: 2024-09-23

## 2024-09-23 RX ORDER — OXYCODONE HYDROCHLORIDE 10 MG/1
20 TABLET ORAL EVERY 2 HOUR PRN
Status: DISCONTINUED | OUTPATIENT
Start: 2024-09-23 | End: 2024-09-23

## 2024-09-23 ASSESSMENT — PAIN - FUNCTIONAL ASSESSMENT

## 2024-09-23 ASSESSMENT — PAIN DESCRIPTION - LOCATION
LOCATION: CHEST
LOCATION: BACK
LOCATION: CHEST
LOCATION: CHEST

## 2024-09-23 ASSESSMENT — COGNITIVE AND FUNCTIONAL STATUS - GENERAL
DAILY ACTIVITIY SCORE: 24
MOBILITY SCORE: 24

## 2024-09-23 ASSESSMENT — PAIN SCALES - GENERAL
PAINLEVEL_OUTOF10: 9
PAINLEVEL_OUTOF10: 8
PAINLEVEL_OUTOF10: 9
PAINLEVEL_OUTOF10: 8
PAINLEVEL_OUTOF10: 9
PAINLEVEL_OUTOF10: 10 - WORST POSSIBLE PAIN

## 2024-09-23 ASSESSMENT — PAIN SCALES - PAIN ASSESSMENT IN ADVANCED DEMENTIA (PAINAD): TOTALSCORE: MEDICATION (SEE MAR)

## 2024-09-23 ASSESSMENT — ENCOUNTER SYMPTOMS
FATIGUE: 1
BACK PAIN: 1
CHEST TIGHTNESS: 1

## 2024-09-23 NOTE — CARE PLAN
The patient's goals for the shift include      The clinical goals for the shift include Pt will remain safe in room and use call ligjht during shift on 9/22/24@ 1930      Problem: Pain  Goal: Takes deep breaths with improved pain control throughout the shift  Outcome: Progressing  Goal: Turns in bed with improved pain control throughout the shift  Outcome: Progressing  Goal: Walks with improved pain control throughout the shift  Outcome: Progressing  Goal: Performs ADL's with improved pain control throughout shift  Outcome: Progressing  Goal: Participates in PT with improved pain control throughout the shift  Outcome: Progressing  Goal: Free from opioid side effects throughout the shift  Outcome: Progressing  Goal: Free from acute confusion related to pain meds throughout the shift  Outcome: Progressing     Problem: Fall/Injury  Goal: Not fall by end of shift  Outcome: Progressing  Goal: Be free from injury by end of the shift  Outcome: Progressing  Goal: Verbalize understanding of personal risk factors for fall in the hospital  Outcome: Progressing  Goal: Verbalize understanding of risk factor reduction measures to prevent injury from fall in the home  Outcome: Progressing  Goal: Use assistive devices by end of the shift  Outcome: Progressing  Goal: Pace activities to prevent fatigue by end of the shift  Outcome: Progressing

## 2024-09-23 NOTE — CONSULTS
Inpatient consult to Integrative Medicine  Consult performed by: RAAD Cronin-CNP  Consult ordered by: RAVI Macias          Reason For Consult  Symptom management     History Of Present Illness  Joellen Narayan is a 23 y.o. 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 Urology following for ongoing management. Integrative medicine consulted by [facility] team for non-pharmacological symptom management of pain.    Pt resting in the chair. Reports ongoing 9/10 chest, lower back pain, intermittent, throbbing    Family Hx: Father: heart disease, CA. Mother: heart disease. Brother: lung CA     Social Hx: lives in a home with his mother. Never , no children. Currently works as a  at [employer]  Tobacco: denies   ETOH: denies   Illicits: denies      Spiritual Hx: Buddhism     Joys: reading, walking, outdoors, music - all types        Information obtained from chart review, discussion with patient/family, and discussion with primary team.       Past Medical History  He has a past medical history of Corrosion of unspecified body region, unspecified degree (12/31/2014), Impetigo (01/04/2024), Personal history of diseases of the blood and blood-forming organs and certain disorders involving the immune mechanism, Personal history of other (healed) physical injury and trauma (01/03/2015), Personal history of other diseases of the circulatory system, Personal history of other diseases of the circulatory system, Rash and other nonspecific skin eruption (09/09/2014), and Sickle-cell disease with pain (Multi) (12/19/2023).    Surgical History  He has a past surgical history that includes IR CVC tunneled (6/21/2022); CT guided  "percutaneous biopsy LYMPH node superficial (11/18/2022); and CT guided percutaneous abdominal retroperitoneum biopsy (11/30/2023).     Social History  He reports that he has been smoking cigars. He has been exposed to tobacco smoke. He has never used smokeless tobacco. He reports current drug use. Drug: Marijuana. He reports that he does not drink alcohol.    Family History  Family History   Problem Relation Name Age of Onset    Sickle cell trait Mother      Sickle cell trait Father      Lung cancer Brother          Allergies  Cefepime, Amoxicillin, and Ceftriaxone    Review of Systems   Constitutional:  Positive for fatigue.   Respiratory:  Positive for chest tightness.    Musculoskeletal:  Positive for back pain.   All other systems reviewed and are negative.       Physical Exam  Vitals reviewed.   Constitutional:       General: He is not in acute distress.     Appearance: Normal appearance. He is normal weight. He is ill-appearing.   HENT:      Head: Normocephalic and atraumatic.      Nose: Nose normal.      Mouth/Throat:      Mouth: Mucous membranes are moist.   Eyes:      Extraocular Movements: Extraocular movements intact.      Pupils: Pupils are equal, round, and reactive to light.   Cardiovascular:      Rate and Rhythm: Normal rate.   Pulmonary:      Effort: Pulmonary effort is normal.   Abdominal:      General: Abdomen is flat.      Palpations: Abdomen is soft.   Skin:     General: Skin is warm and dry.   Neurological:      General: No focal deficit present.      Mental Status: He is alert and oriented to person, place, and time. Mental status is at baseline.   Psychiatric:         Mood and Affect: Mood normal.         Behavior: Behavior normal.         Thought Content: Thought content normal.         Judgment: Judgment normal.          Last Recorded Vitals  Blood pressure 123/66, pulse 70, temperature 36.4 °C (97.5 °F), resp. rate 16, height 1.88 m (6' 2\"), weight 69.4 kg (153 lb), SpO2 98%.    Relevant " Results  Scheduled medications  baclofen, 5 mg, oral, TID  flutamide, 125 mg, oral, TID  folic acid, 1 mg, oral, Daily  gabapentin, 100 mg, oral, q8h REYNALDO  ketoconazole, 200 mg, oral, BID  oxygen, , inhalation, Continuous - Inhalation  pantoprazole, 40 mg, intravenous, Daily  polyethylene glycol, 17 g, oral, Daily  predniSONE, 5 mg, oral, Daily  pseudoephedrine, 60 mg, oral, q8h  sennosides-docusate sodium, 2 tablet, oral, BID      Continuous medications     PRN medications  PRN medications: butalbital-acetaminophen-caff, diphenhydrAMINE, HYDROmorphone, ondansetron ODT **OR** [DISCONTINUED] ondansetron, oxyCODONE, oxygen    Results for orders placed or performed during the hospital encounter of 09/12/24 (from the past 24 hour(s))   Lactate   Result Value Ref Range    Lactate 1.2 0.4 - 2.0 mmol/L   Comprehensive Metabolic Panel   Result Value Ref Range    Glucose 97 74 - 99 mg/dL    Sodium 138 136 - 145 mmol/L    Potassium 3.7 3.5 - 5.3 mmol/L    Chloride 104 98 - 107 mmol/L    Bicarbonate 26 21 - 32 mmol/L    Anion Gap 12 10 - 20 mmol/L    Urea Nitrogen 6 6 - 23 mg/dL    Creatinine 0.58 0.50 - 1.30 mg/dL    eGFR >90 >60 mL/min/1.73m*2    Calcium 9.1 8.6 - 10.6 mg/dL    Albumin 4.1 3.4 - 5.0 g/dL    Alkaline Phosphatase 141 (H) 33 - 120 U/L    Total Protein 6.3 (L) 6.4 - 8.2 g/dL    AST 41 (H) 9 - 39 U/L    Bilirubin, Total 3.3 (H) 0.0 - 1.2 mg/dL    ALT 33 10 - 52 U/L   Lactate Dehydrogenase   Result Value Ref Range     (H) 84 - 246 U/L   Magnesium   Result Value Ref Range    Magnesium 2.11 1.60 - 2.40 mg/dL   CBC and Auto Differential   Result Value Ref Range    WBC 10.1 4.4 - 11.3 x10*3/uL    nRBC 3.2 (H) 0.0 - 0.0 /100 WBCs    RBC 3.27 (L) 4.50 - 5.90 x10*6/uL    Hemoglobin 9.3 (L) 13.5 - 17.5 g/dL    Hematocrit 28.5 (L) 41.0 - 52.0 %    MCV 87 80 - 100 fL    MCH 28.4 26.0 - 34.0 pg    MCHC 32.6 32.0 - 36.0 g/dL    RDW 19.7 (H) 11.5 - 14.5 %    Platelets 310 150 - 450 x10*3/uL    Neutrophils % 45.1 40.0 -  80.0 %    Immature Granulocytes %, Automated 0.6 0.0 - 0.9 %    Lymphocytes % 34.4 13.0 - 44.0 %    Monocytes % 11.5 2.0 - 10.0 %    Eosinophils % 8.1 0.0 - 6.0 %    Basophils % 0.3 0.0 - 2.0 %    Neutrophils Absolute 4.57 1.20 - 7.70 x10*3/uL    Immature Granulocytes Absolute, Automated 0.06 0.00 - 0.70 x10*3/uL    Lymphocytes Absolute 3.48 1.20 - 4.80 x10*3/uL    Monocytes Absolute 1.17 (H) 0.10 - 1.00 x10*3/uL    Eosinophils Absolute 0.82 (H) 0.00 - 0.70 x10*3/uL    Basophils Absolute 0.03 0.00 - 0.10 x10*3/uL   Reticulocytes   Result Value Ref Range    Retic % 13.4 (H) 0.5 - 2.0 %    Retic Absolute 0.439 (H) 0.022 - 0.118 x10*6/uL    Reticulocyte Hemoglobin 32 28 - 38 pg    Immature Retic fraction 25.1 (H) <=16.0 %     ECG 12 lead    Result Date: 9/22/2024  Normal sinus rhythm Rightward axis Nonspecific T wave abnormality Abnormal ECG When compared with ECG of 23-AUG-2024 20:17, No significant change was found Confirmed by Theo Hassan ([id number]) on 9/22/2024 2:47:13 PM    XR chest 1 view    Result Date: 9/21/2024  Interpreted By:  Rogelio Tsang  and Michelle Pradhan STUDY: XR CHEST 1 VIEW;  9/20/2024 6:34 pm   INDICATION: Signs/Symptoms:sickle cell, increased pain after removal of trialysis line today.   COMPARISON: Chest radiograph 09/20/2024, CT abdomen/pelvis 08/23/2024.   ACCESSION NUMBER(S): [id number]   ORDERING CLINICIAN: DAE LOPEZ   FINDINGS: AP radiograph of the chest was provided.   MEDICAL DEVICES: None.   CARDIOMEDIASTINAL SILHOUETTE: Cardiomediastinal silhouette is normal in size and configuration.   LUNGS: No pneumothorax, pleural effusion or focal consolidation.   ABDOMEN: No remarkable upper abdominal findings.   BONES: No acute osseous changes.       1.  No evidence of acute cardiopulmonary process.   I personally reviewed the images/study and I agree with the findings as stated by Dr. Lorne Martinez. This study was interpreted at [facility], [place], [place].    MACRO: None   Signed by: Rogelio Tsang 9/21/2024 1:15 PM Dictation workstation:   [id number]    XR chest 1 view    Result Date: 9/20/2024  Interpreted By:  Allyn Woods, STUDY: XR CHEST 1 VIEW;  9/20/2024 11:25 am   INDICATION: Signs/Symptoms:r/o pneumothorax.     COMPARISON: 09/12/2024   ACCESSION NUMBER(S): [id number]   ORDERING CLINICIAN: DONNA MORGAN   FINDINGS:         CARDIOMEDIASTINAL SILHOUETTE: Cardiomediastinal silhouette is normal in size and configuration.   LUNGS: Lungs are clear. No evidence of a pneumothorax. Costophrenic angles are clear   ABDOMEN: No remarkable upper abdominal findings.   BONES: No acute osseous changes.       1.  No evidence of acute cardiopulmonary process.       MACRO: None   Signed by: Allyn Woods 9/20/2024 1:19 PM Dictation workstation:   [id number]    US right upper quadrant    Result Date: 9/19/2024  Interpreted By:  Kasprzak, Jamshid,  and Denver Heidi STUDY: US RIGHT UPPER QUADRANT;  9/18/2024 3:26 pm   INDICATION: Signs/Symptoms:c/f cholecystitis.     COMPARISON: CT abdomen pelvis 08/23/2024   ACCESSION NUMBER(S): [id number]   ORDERING CLINICIAN: ROSEANNA SOSA   TECHNIQUE: Multiple images of the right upper quadrant were obtained.  This examination was interpreted at [facility].   FINDINGS: LIVER: The liver measures 19.2 cm and is grossly unremarkable and free of any focal lesions.   GALLBLADDER: The gallbladder is nondistended, and demonstrates no evidence of wall thickening or surrounding fluid. The gallbladder wall thickness is 0.2 cm. Sonographic Roman's sign is negative.   Multiple echogenic gallstones within the gallbladder lumen posterior shadowing.   Several rounded nodules in the pericholecystic region measuring up to 0.9 cm favored to represent nonenlarged lymph nodes.   BILE DUCTS: No evidence of intra or extrahepatic biliary dilatation is identified; the common bile duct measures 0.3 cm.   PANCREAS:  The pancreas is poorly visualized due to overlying bowel gas.   RIGHT KIDNEY: The right kidney measures 11.9 cm in length. The renal cortical echogenicity and thickness are within normal limit.  No hydronephrosis or renal calculi are seen.       1. Cholelithiasis without sonographic evidence of acute cholecystitis. 2. Mild hepatomegaly.   I personally reviewed the image(s)/study and resident interpretation as stated by Dr. Heidi Contreras MD. I agree with the findings as stated. This study was interpreted at [facility], [place], [place].   MACRO: None   Signed by: Jamshid Enrique 9/19/2024 7:40 AM Dictation workstation:   [id number]    IR CVC nontunneled    Result Date: 9/18/2024  Interpreted By:  Rakesh Pablo and Ritchie Brandon STUDY: IR CVC NONTUNNELED;  9/18/2024 2:24 pm   INDICATION: Signs/Symptoms:apheresis line placement for RBCEx.   COMPARISON: None.   ACCESSION NUMBER(S): [id number]   ORDERING CLINICIAN: ROSEANNA SOSA   TECHNIQUE: INTERVENTIONALIST(S): MD Vega Hinds DO, PGY-3   CONSENT: The patient was informed of the nature of the proposed procedure. The purposes, alternatives, risks, and benefits were explained and discussed. All questions were answered and consent was obtained.   RADIATION EXPOSURE: Fluoroscopy time: 0.1 min Dose: 0.54 mGy Dose Area Product (DAP): 122 mGy*cm^2   SEDATION: Moderate conscious IV sedation services (supervision of administration, induction, and maintenance) were provided by the physician performing the procedure with intravenous fentanyl 150mcg and versed 3mg for 20 minutes. The physician was assisted by an independent trained observer, an interventional radiology nurse, in the continuous monitoring of patient level of consciousness and physiologic status. Please note, proximally 100 mcg fentanyl/2 mg Versed were provided via the micro puncture access secondary to IV infiltration.   MEDICATION: None.    TIME OUT: A time out was performed immediately prior to procedure start with the interventional team, correctly identifying the patient name, date of birth, MRN, procedure, anatomy (including marking of site and side), patient position, procedure consent form, relevant laboratory and imaging test results, antibiotic administration, safety precautions, and procedure-specific equipment needs.   COMPLICATIONS: No immediate adverse events identified.   FINDINGS: The patient was positioned supine on the angiography table. The right supraclavicular cutaneous tissues were prepared and draped in sterile manner.  1% lidocaine local anesthesia was instilled into the subcutaneous soft tissues at the selected access site for local anesthesia. Ultrasound images demonstrate a patent and compressible right internal jugular vein. Utilizing direct ultrasound guidance and micropuncture/Seldinger technique, the right internal jugular vein was accessed. An ultrasound digital spot image was acquired and stored on the  PACS. After confirmation of location, a 018 Camden-Mandrel guidewire was introduced and advanced into the inferior vena cava utilizing intermittent fluoroscopy.   The needle was removed with the guidewire left in place and exchanged for a 5-Cymro coaxial dilator. The inner dilator and wire were removed and a J-tipped 035 guidewire was introduced through the access sheath.  The skin tract was dilated with successive increase in size of vascular dilators under direct fluoroscopic visualization.   Subsequently, a temporary 11.5 Cymro x 15 cm catheter was advanced with its tip overlying the cavoatrial junction under direct fluoroscopic guidance.  The catheter ports were aspirated and flushed with normal saline and charged with heparin. The external portions of the catheter were secured in place with sterile suture dressings.   The patient tolerated the procedure without complication.       1. Technically successful and  uncomplicated placement of a right internal jugular temporary apheresis catheter under direct ultrasound and fluoroscopic guidance - optimal catheter tip position at the right atrial superior vena caval junction and the catheter is ready for immediate use.   I was present for and/or performed the critical portions of the procedure and immediately available throughout the entire procedure.   I personally reviewed the image(s) / study and resident interpretation. I agree with the findings as stated.   Performed and dictated at [facility].   MACRO: None.   Signed by: Rakesh Pablo 9/18/2024 2:48 PM Dictation workstation:   [id number]    Vascular US penile artery duplex complete    Result Date: 9/16/2024  Interpreted By:  Nik Edwards and Baker Zachary STUDY: [facility] US PENILE ARTERY DUPLEX COMPLETE;   INDICATION: Signs/Symptoms:sickle cell, persistant priapism.   COMPARISON: Ultrasound penile artery duplex on 09/01/2024.   ACCESSION NUMBER(S): [id number]   ORDERING CLINICIAN: DAE LOPEZ   TECHNIQUE: Multiplanar grayscale ultrasound images of the penis were obtained with color and spectral Doppler evaluation.   FINDINGS: Penile anatomy: The corpus cavernosa, corpus spongiosum, and urethra are within normal limits. The tunica albuginea is intact. No focal soft tissue lesion or fluid collection.   Doppler evaluation: Normal caliber of the bilateral cavernosal arteries, improved compared to prior exam. Intact arterial and venous flow within the bilateral corpus cavernosa, improved compared to prior exam. Flow is noted within the deep dorsal vein. The superficial dorsal vein was not definitively visualized on this exam. Flow is noted within the dorsal arteries bilaterally. The resistive indices in the right and left cavernosal arteries are 1.0 and 0.96 respectively. The velocities within the right and left cavernosal arteries are 88.1 cm/sec and 43.1 cm/sec  respectively.       Normal caliber and peak systolic velocities of the bilateral cavernosal arteries, which have improved compared to prior exam. Persistent elevation of the resistive indices on Doppler interrogation of the visualized portions of the cavernosal arteries, which is nonspecific.   I personally reviewed the images/study and I agree with the findings as stated by Dr. Lorne Quintanilla M.D. This study was interpreted at [place], [place].   MACRO: None   Signed by: Nik Fuchs 9/16/2024 9:58 AM Dictation workstation:   [id number]    XR chest 1 view    Result Date: 9/12/2024  Interpreted By:  Sravanthi Combs  and Amador Rose STUDY: XR CHEST 1 VIEW;  9/12/2024 8:14 pm   INDICATION: Signs/Symptoms:cp/sob.   COMPARISON: Chest radiograph 08/24/2024   ACCESSION NUMBER(S): [id number]   ORDERING CLINICIAN: MARK BLACKWELL   FINDINGS: AP radiograph of the chest was provided.   CARDIOMEDIASTINAL SILHOUETTE: Cardiomediastinal silhouette is normal in size and configuration.   LUNGS: No pulmonary consolidation. No pleural effusion or pneumothorax.   ABDOMEN: No remarkable upper abdominal findings.   BONES: No acute osseous changes.       1.  No acute cardiopulmonary process.   I personally reviewed the images/study and resident's interpretation and I agree with the findings as stated by Rose English MD (resident radiologist). This study was analyzed and interpreted at [place], [place].   MACRO: None   Signed by: Sravanthi Combs 9/12/2024 10:22 PM Dictation workstation:   [id number]    Vascular US penile artery duplex complete    Result Date: 9/1/2024  Interpreted By:  Curtis Johnson and Afshari Mirak Sohrab STUDY: VASC US PENILE ARTERY DUPLEX COMPLETE; ;  9/1/2024 7:08 pm   INDICATION: Signs/Symptoms:persistent priapism.   COMPARISON: None.   ACCESSION NUMBER(S): [id number]   ORDERING CLINICIAN: WOLF VIERA    TECHNIQUE: Multiplanar grayscale ultrasound images of the penis were obtained with color and spectral Doppler evaluation.   FINDINGS: Penile anatomy: The visualized portions of the corpus cavernosa is within normal limits. The tunica albuginea is intact. No focal soft tissue lesion or fluid collection.   Doppler evaluation: There is a small caliber of bilateral cavernosal arteries. Flow is noted within the deep and superficial dorsal veins. Flow is noted within the dorsal arteries bilaterally. The resistive indices in the right and left cavernosal arteries are 0.76 and 0.9 respectively. The velocities within the right and left cavernosal arteries are 8.3 cm/sec and 9.1 cm/sec respectively.       *Small caliber of bilateral cavernosal arteries with lack of color flow in portions of both arteries, more on the right. Additionally there is markedly low peak systolic velocities and increased resistive indices on Doppler interrogation of the visualized portions. Findings in the setting of sickle cell disease are compatible with low-flow/ischemic priapism. Urologic consultation is recommended.   I personally reviewed the images/study and I agree with the findings as stated by resident physician Dr. José Miguel Flowers . This study was interpreted at [facility], [place], [place].   MACRO: José Miguel Flowers discussed the significance and urgency of this critical finding by telephone with  WOLF VIERA on 9/1/2024 at 7:16 pm.  (**-RCF-**) Findings:  See findings.   Signed by: Curtis Johnson 9/1/2024 7:27 PM Dictation workstation:   [id number]        Assessment/Plan   Introduction to Integrative Medicine:  Spoke with pt at bedside. Patient seemed to appreciate the extra layer of support.   Integrative Medicine was introduced as a service for patients with serious illness to help with symptoms through non-pharmacological management, such as mindfulness, acupuncture, and gentle bodywork. Such  interventions can assist with symptoms such as anxiety, fatigue, nausea, depression and pain.     The [facility] Integrative Medicine Symptom Management program offers multi-disciplinary supervised care of cancer patients using Integrative Modalities billed to insurance using NCCN and SIO/ASCO guideline-driven practices.  ESAS is obtained prior to and after each treatment by the practitioner       Pre- Post-   Wellbeing   5.5  NA - pt sleeping after intervention   Coping  7  NA - pt sleeping after intervention   Pain  9  NA - pt sleeping after intervention   Fatigue  10  NA - pt sleeping after intervention   Anxiety   5  NA - pt sleeping after intervention   Depression  5  NA - pt sleeping after intervention   Stress  0  NA - pt sleeping after intervention   Nausea  6  NA - pt sleeping after intervention       Sickle cell disease with acute on chronic pain:   pain related to vaso-occlusive crisis  Defer to primary team for adequate PO/IV pain regimen   Recommend integrative therapy modalities as pt allows:  -Acupuncture; provided pt education today. Pt declined today, requesting acupuncture tomorrow.   -Acupressure, gua sha   -Gentle bodywork and stretching as tolerated   -Reiki/meditation - completed 20 mins of reiki with pt today; pt sleeping at end of treatment        -Art therapy - pt declined   -Music therapy - consult placed   -Chaplaincy - pt declined   -Pet therapy - consult placed          Thank you for allowing us to participate in the care of this patient. Integrative Medicine Team will continue to follow as needed.  Please contact team with any questions or concerns.           RAAD Lopez-CNP (available by AppF[employer])  [facility]  Inpatient Integrative Medicine      I spent 80 minutes in the care of this patient which included chart review, interviewing patient/family, discussion with primary team, coordination of care, and documentation.     Medical  Decision Making was high level due to high complexity of problems, extensive data review, and high risk of management/treatment.

## 2024-09-23 NOTE — CARE PLAN
Problem: Pain  Goal: Takes deep breaths with improved pain control throughout the shift  Outcome: Progressing  Goal: Turns in bed with improved pain control throughout the shift  Outcome: Progressing  Goal: Walks with improved pain control throughout the shift  Outcome: Progressing  Goal: Performs ADL's with improved pain control throughout shift  Outcome: Progressing  Goal: Participates in PT with improved pain control throughout the shift  Outcome: Progressing  Goal: Free from opioid side effects throughout the shift  Outcome: Progressing  Goal: Free from acute confusion related to pain meds throughout the shift  Outcome: Progressing     Problem: Pain - Adult  Goal: Verbalizes/displays adequate comfort level or baseline comfort level  Outcome: Progressing     Problem: Safety - Adult  Goal: Free from fall injury  Outcome: Progressing     Problem: Discharge Planning  Goal: Discharge to home or other facility with appropriate resources  Outcome: Progressing     Problem: Chronic Conditions and Co-morbidities  Goal: Patient's chronic conditions and co-morbidity symptoms are monitored and maintained or improved  Outcome: Progressing     Problem: Fall/Injury  Goal: Not fall by end of shift  Outcome: Progressing  Goal: Be free from injury by end of the shift  Outcome: Progressing  Goal: Verbalize understanding of personal risk factors for fall in the hospital  Outcome: Progressing  Goal: Verbalize understanding of risk factor reduction measures to prevent injury from fall in the home  Outcome: Progressing  Goal: Use assistive devices by end of the shift  Outcome: Progressing  Goal: Pace activities to prevent fatigue by end of the shift  Outcome: Progressing   The patient's goals for the shift include      The clinical goals for the shift include Pt will remain safe in room and use call ligjht during shift on 9/23/24 @ 1930    Pt continued to have uncontrolled pain. Attempted to rotate IV and PO. VSS.
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
## 2024-09-23 NOTE — PROGRESS NOTES
"Joellen Narayan is a 23 y.o. male on day 10 of admission presenting with Sickle cell crisis (Multi).    Subjective   Seen at bedside, Denies N/V/D/C, chest pain, cough, shortness of breath.  Pain is epigastric. Didn't have a good night 2/2 pain. We discussed increasing his PO oxy to 15mg rather than going back to IVP only since he wants to DC soon.        Objective     Physical Exam  Constitutional:       General: He is not in acute distress.     Appearance: He is not toxic-appearing.   HENT:      Head: Normocephalic and atraumatic.   Eyes:      General: Scleral icterus present.      Extraocular Movements: Extraocular movements intact.   Cardiovascular:      Rate and Rhythm: Normal rate and regular rhythm.   Pulmonary:      Effort: No respiratory distress.   Abdominal:      Tenderness: There is abdominal tenderness.   Musculoskeletal:         General: No swelling or tenderness.   Skin:     Coloration: Skin is not jaundiced.      Findings: No bruising or erythema.   Neurological:      Mental Status: He is oriented to person, place, and time. Mental status is at baseline.         Last Recorded Vitals  Blood pressure (!) 112/49, pulse 60, temperature 36.7 °C (98.1 °F), resp. rate 16, height 1.88 m (6' 2\"), weight 69.4 kg (153 lb), SpO2 99%.  Intake/Output last 3 Shifts:  I/O last 3 completed shifts:  In: 700 (10.1 mL/kg) [P.O.:700]  Out: 0 (0 mL/kg)   Weight: 69.4 kg         Assessment/Plan   Assessment & Plan  Sickle cell crisis (Multi)    Joellen Narayan is a 23 y.o. 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 CXR (9/12) negative for acute cardiopulmonary process. Urology following patient for priapism, upon presentation, patient did not agree to bedside drainage and corporal blood gas. 9/13 started on Ketoconazole 200mg BID and prednisone 5mg daily per urology recs. Penile doppler US ordered 9/15 per urology, showing normal caliber and peak systolic velocities of b/l cavernosal arteries which have improved since prior. Hematology consulted on 9/13 give persistent priapism x1 month, recommend emergent RBCex. 9/13 plan for apheresis line placement in the ED followed by emergent RBCex, however, patient declined placement of line in ED on 9/13, plan for apheresis line placement with IR followed by RBCex on 9/16. On 9/16 he then again declined apheresis line placement and RBCEx. Pain was stable x2 days until again 9/18 pt endorsed significant groin pain, penis still bendable. Discussed with urology, pt to be re-evaluated. Tbili up to 8.0 (9/18), 11.1 (9/19); RUQ US with cholelithiasis without cholecystitis (9/18). Pt became amenable to RBCEx, s/p apheresis line 9/18 and s/p RBC exchange pt 9/19. On 9/20, lysis labs improving post exchange. Apheresis line removed, EKG, CXR and troponin were unremarkable. Urology re-engaged on 9/20 for continued groin/shaft pain localized around site from recent urology intervention with minimal improvement clinically since RBCex; no interventions needed. 9/22 developed epigastric pain, given recent choledocholithiasis, amylase and lipase sent (negative), troponin and CXR also negative 9/21. Rotating PO oxycodone and IVP dilaudid; Discharge pending improvement in pain and stability of priapism.       # Priapism  - Presented to the ED with worsening priapism since 5pm (hadn't resolved from last admission)  - s/p recent admission 8/31-9/9, OR with urology for priapism drainage, penile block, and corpora cavernosa irrigation  - Also, s/p scheduled  Sudafed q8h and added ibuprofen, baclofen 5mg TID and gabapentin 100mg TID  - Penile doppler US while prior inpatient (9/1) showing small caliber of bilateral cavernosal arteries with lack of color flow in portions of both arteries, more on the right, c/w low-flow/ischemic priapism. Urology notified, s/p phenylephrine injection with urology 9/1 evening with partial resolution of priapism  - Reassessed by Urology 9/7/24 reevaluate penile appearance; firm but still bendable with no increased pain. Rec to continue management as prior and to maintain outpatient appointment on 9/10/24.  - Outpatient appointment 9/10 prescribed Flutamide 125mg TID but per parent, no pharmacy has this medication so it was not started  - Doppler U/S completed outpatient 9/10 noted arterial flow  - Urology following rec bedside drainage and corporal blood gas and phenylepinephine injection --patient refusing; also rec starting treatment with ketoconazole with prednisone, 9/15 rec penile doppler US    - Penile doppler US (9/15) per urology, showing normal caliber and peak systolic velocities of b/l cavernosal arteries which have improved since prior.   - Heme following and rec emergent RBCex in setting of prolonged priapism  - Continue home Sudafed q8h prn and added ibuprofen, Baclofen 5mg TID and Gabapentin 100mg TID  - 9/13 started on ketoconazole 200mg BID and Prednisone 5mg daily-per urology recs pt to be dc'd with this  - C. Trachomatis/N. Gonorrhoeae (9/14): negative   - Initially planned for aphesis line placement followed by RBCex on 9/13, however, patient declined placement of apheresis line in the ED, primary, hematology and urology team explained risks to patient and he continued to decline   - Planed for apheresis line placement in IR on 9/16 followed by RBCex - hematology, Dr. Cruz, and trransfusion med team aware--> however on 9/16 pt declined apheresis line placement and RBCEx again  - 9/16 pt was educated on risks of  refusing/delaying medical care and recurrent priapism  - Pain was stable x2 days until again 9/18 pt endorsed significant groin pain, penis still bendable. Discussed with urology, pt to be re-evaluated.   - RUQ US with cholelithiasis without cholecystitis (9/18)   - Pt amenable to RBCEx, IR placed apheresis line 9/18 and s/p RBCex 9/19   - Tbili increased to 8.0 (9/18) --> 11.1 (9/19) --> 6.3 (9/20) s/p RBCex   - On 9/19, patient reporting slight improvement of groin pain, mild RLQ tenderness  - Urology re-engaged on 9/20 for continued groin/shaft pain localized around site from recent urology intervention with minimal improvement clinically since RBCex; no interventions at this time      # Hgb SS with severe pain   - OARRS reviewed, no aberrancies  - No current care path  - Hgb baseline ~8.5; currently stable, no indication for simple transfusion   - Tbili baseline fluctuates ~5-1; Tbili 4.4 on 9/16, up to 6.0 on 9/17  - LDH baseline ~500   on 9/16  - s/p IV Dilaudid 2mg q2hrs PRN severe pain (9/12-9/16)  - 9/16 weaned pain meds to IV dilaudid 1mg q3hrs PRN severe pain  - Overnight patient lost IV access, refused subcutaneous dilaudid but since pain is improving agreeable to start 20mg PO oxycodone Q4 hours for severe pain (9/19)   - given uncontrolled pain, patient switched to rotating PO oxycodone 15mg and IVP dilaudid 2mg q2h  - Continue folic acid 1mg daily  - Hgb S (9/12): 59.8%   - Repeat Hgb S (9/18): 59.9%  - Hgb S post exchange 19.5%   - Apheresis line removed, CXR and troponin unremarkable (ordered d/t chest pain after line was removed. EKG NSR.   - PO Zofran PRN for opioid-induced nausea, PO Benadryl PRN for opioid-induced pruritus, Bowel regimen for opioid-induced constipation with DocuSenna 2tabs BID and Miralax daily     # Recent Choledocholithiasis  # Epigastric pain   - s/p ERCP 7/18/24 with a biliary sphincterotomy where sludge and stones were removed, achieving complete clearance  - Seen by  gen surg 8/8/24 Dr. Dove who rec lap cholecystectomy d/t the chance of recurrence of choledocholithiasis  - Surgery has yet to be scheduled, has FUV with gen surg again 9/30  - Tbili 4.4 on 9/16, (recent peak at 52.8 prior to ERCP during recent hospital admission); increased to 8.9 (9/18) --> 11.1 (9/19) --> 6.3 (9/20) s/p RBCex   - RUQ US with cholelithiasis without cholecystitis (9/18)   - pt reports epigastric pain 9/22, PPI changed to IV   - amylase and lipase negative (9/22)  - CXR 9/21 negative for acute process, troponin negative   - low threshold for CT AP if worsening pain, though improving as of 9/23      # Nodular lymphocyte predominant Hodgkins lymphoma (NLPHL)   - Primary Oncologist: Dr. Stoll  - Enlarged lymph nodes noted 4/1/22  - RUQ US (11/14/22) with mildly enlarged LNs in the region of the kavin hepatis  - MRI liver (11/16/22) showed re-demonstration of bulky retroperitoneal lymphadenopathy and kavin hepatic lymphadenopathy    - (11/18/22) lymph node biopsy showed atypical lymphoid infiltrate. Reviewed by Hemepath board, insufficient for lymphoma diagnosis  - PET/CT (12/6/22) showing retroperitoneal lymphadenopathy  - Followed up with Dr. Stoll (12/16/22) with plan for surg/onc consult for large tissue bx but patient missed apt and was lost to follow up  - Requested new apt with Dr. Ronnie Marte 6/19/23, patient was not seen and lost to follow up  - CT a/p (11/28/23) increased size of retroperitoneal lymph nodes reflecting extramedullary hematopoiesis I/s/o sickle cell vs lymphoma  - Paraaortic LN bx via IR (11/30/23) consistent with NLPHL. Flow: no clonal B cell or T cell population identified, lymphocyte 95%, CD3+CD4+ 68%, CD3+CD8+ 7%, CD19+ 24%  - Elevated LDH/bili partially from sickle cell disease   - Chemotherapy (R-CHOP) was discussed with primary oncologist Dr. Stoll, and decision was made to simplify his chemotherapy to Rituxan and prednisone q3 weeks mainly due to frequent sickle  "cell crisis  - Current chemo regimen: Rituxan and prednisone q3 weeks (C1 1/18/24, C2 2/8/24, Missed C3 d/t sickle cell pain crisis, C4 4/4/24, C5 5/16/24, C6 6/7/24)  - Per pt no longer taking Acyclovir 400mg BID prophy   - PET CT (7/25) overall showing great response to treatment with persistent residual viable disease involving a left common iliac node  - Last FUV with Dr. Stoll 7/25/24 rec RTC in 2 months (next FUV scheduled for 9/19)     # Hx of nocturnal oxygen dependence and hypoxia on room air  - Has not had home oxygen for 2-3 years   - Wean as able, encourage incentive spirometry  - Pulm FUV 8/8- \"Given his history of sickle cell disease, it is important to ensure he has formal sleep testing to look at desaturations and presence of sleep apnea despite not having a convincing history/body habitus typical for suspecting sleep apnea- patients with sickle cell have a higher prevalence of sleep disorders including nocturnal hypoxemia\"--> rec sleep referral for formal testing if deemed appropriate, ABG and O2 destat testing, and TTE with bubble study  - TTE 8/23 showing EF 60-65%, severely dilated LV and LA, + intrapulmonary shunting    - Walking pulse ox ordered 9/20 given intermittent oxygen requirements throughout admission      DVT prophy: Lovenox subcutaneous, SCDs, encourage ambulation     DISPO:  - Full code, confirmed on admit  - Discharge pending improvement in pain and stability of priapism and RBCex   - SUSIE Narayan (Parent) [phone]  - Sickle cell FUV 9/18 (pt to reschedule on own), Dr. Stoll FUV 9/19, Gen Surg FUV 9/30, Urology FUV 10/7       I spent >60 minutes in the professional and overall care of this patient.      Mary Moody, APRN-CNP      "

## 2024-09-24 LAB
ALBUMIN SERPL BCP-MCNC: 4.4 G/DL (ref 3.4–5)
ALP SERPL-CCNC: 131 U/L (ref 33–120)
ALT SERPL W P-5'-P-CCNC: 37 U/L (ref 10–52)
ANION GAP SERPL CALC-SCNC: 12 MMOL/L (ref 10–20)
AST SERPL W P-5'-P-CCNC: 44 U/L (ref 9–39)
BASOPHILS # BLD AUTO: 0.03 X10*3/UL (ref 0–0.1)
BASOPHILS NFR BLD AUTO: 0.3 %
BILIRUB SERPL-MCNC: 4 MG/DL (ref 0–1.2)
BUN SERPL-MCNC: 6 MG/DL (ref 6–23)
CALCIUM SERPL-MCNC: 9.5 MG/DL (ref 8.6–10.6)
CHLORIDE SERPL-SCNC: 103 MMOL/L (ref 98–107)
CO2 SERPL-SCNC: 29 MMOL/L (ref 21–32)
CREAT SERPL-MCNC: 0.66 MG/DL (ref 0.5–1.3)
EGFRCR SERPLBLD CKD-EPI 2021: >90 ML/MIN/1.73M*2
EOSINOPHIL # BLD AUTO: 0.78 X10*3/UL (ref 0–0.7)
EOSINOPHIL NFR BLD AUTO: 8 %
ERYTHROCYTE [DISTWIDTH] IN BLOOD BY AUTOMATED COUNT: 18.7 % (ref 11.5–14.5)
GLUCOSE SERPL-MCNC: 82 MG/DL (ref 74–99)
HCT VFR BLD AUTO: 28.4 % (ref 41–52)
HGB BLD-MCNC: 9.4 G/DL (ref 13.5–17.5)
HGB RETIC QN: 30 PG (ref 28–38)
IMM GRANULOCYTES # BLD AUTO: 0.06 X10*3/UL (ref 0–0.7)
IMM GRANULOCYTES NFR BLD AUTO: 0.6 % (ref 0–0.9)
IMMATURE RETIC FRACTION: 17.4 %
LDH SERPL L TO P-CCNC: 269 U/L (ref 84–246)
LYMPHOCYTES # BLD AUTO: 3.02 X10*3/UL (ref 1.2–4.8)
LYMPHOCYTES NFR BLD AUTO: 31.2 %
MAGNESIUM SERPL-MCNC: 2.22 MG/DL (ref 1.6–2.4)
MCH RBC QN AUTO: 28.7 PG (ref 26–34)
MCHC RBC AUTO-ENTMCNC: 33.1 G/DL (ref 32–36)
MCV RBC AUTO: 87 FL (ref 80–100)
MONOCYTES # BLD AUTO: 1.22 X10*3/UL (ref 0.1–1)
MONOCYTES NFR BLD AUTO: 12.6 %
NEUTROPHILS # BLD AUTO: 4.58 X10*3/UL (ref 1.2–7.7)
NEUTROPHILS NFR BLD AUTO: 47.3 %
NRBC BLD-RTO: 2.9 /100 WBCS (ref 0–0)
PLATELET # BLD AUTO: 411 X10*3/UL (ref 150–450)
POTASSIUM SERPL-SCNC: 3.7 MMOL/L (ref 3.5–5.3)
PROT SERPL-MCNC: 6.7 G/DL (ref 6.4–8.2)
RBC # BLD AUTO: 3.27 X10*6/UL (ref 4.5–5.9)
RETICS #: 0.42 X10*6/UL (ref 0.02–0.12)
RETICS/RBC NFR AUTO: 12.9 % (ref 0.5–2)
SODIUM SERPL-SCNC: 140 MMOL/L (ref 136–145)
WBC # BLD AUTO: 9.7 X10*3/UL (ref 4.4–11.3)

## 2024-09-24 PROCEDURE — 36415 COLL VENOUS BLD VENIPUNCTURE: CPT

## 2024-09-24 PROCEDURE — 83735 ASSAY OF MAGNESIUM: CPT | Performed by: NURSE PRACTITIONER

## 2024-09-24 PROCEDURE — 1170000001 HC PRIVATE ONCOLOGY ROOM DAILY

## 2024-09-24 PROCEDURE — 99358 PROLONG SERVICE W/O CONTACT: CPT

## 2024-09-24 PROCEDURE — 2500000001 HC RX 250 WO HCPCS SELF ADMINISTERED DRUGS (ALT 637 FOR MEDICARE OP)

## 2024-09-24 PROCEDURE — 85025 COMPLETE CBC W/AUTO DIFF WBC: CPT

## 2024-09-24 PROCEDURE — 2500000004 HC RX 250 GENERAL PHARMACY W/ HCPCS (ALT 636 FOR OP/ED)

## 2024-09-24 PROCEDURE — 85045 AUTOMATED RETICULOCYTE COUNT: CPT

## 2024-09-24 PROCEDURE — 80053 COMPREHEN METABOLIC PANEL: CPT

## 2024-09-24 PROCEDURE — 2500000001 HC RX 250 WO HCPCS SELF ADMINISTERED DRUGS (ALT 637 FOR MEDICARE OP): Performed by: NURSE PRACTITIONER

## 2024-09-24 PROCEDURE — 83615 LACTATE (LD) (LDH) ENZYME: CPT

## 2024-09-24 PROCEDURE — 2500000005 HC RX 250 GENERAL PHARMACY W/O HCPCS: Performed by: PHYSICIAN ASSISTANT

## 2024-09-24 PROCEDURE — 99233 SBSQ HOSP IP/OBS HIGH 50: CPT

## 2024-09-24 PROCEDURE — 2500000004 HC RX 250 GENERAL PHARMACY W/ HCPCS (ALT 636 FOR OP/ED): Performed by: NURSE PRACTITIONER

## 2024-09-24 RX ORDER — ONDANSETRON HYDROCHLORIDE 2 MG/ML
8 INJECTION, SOLUTION INTRAVENOUS ONCE
Status: COMPLETED | OUTPATIENT
Start: 2024-09-24 | End: 2024-09-24

## 2024-09-24 RX ORDER — OXYCODONE HYDROCHLORIDE 10 MG/1
20 TABLET ORAL EVERY 4 HOURS PRN
Status: DISCONTINUED | OUTPATIENT
Start: 2024-09-24 | End: 2024-09-26

## 2024-09-24 RX ORDER — MORPHINE SULFATE 4 MG/ML
10 INJECTION INTRAVENOUS EVERY 2 HOUR PRN
Status: DISCONTINUED | OUTPATIENT
Start: 2024-09-24 | End: 2024-09-26

## 2024-09-24 ASSESSMENT — PAIN SCALES - GENERAL
PAINLEVEL_OUTOF10: 8
PAINLEVEL_OUTOF10: 8
PAINLEVEL_OUTOF10: 7
PAINLEVEL_OUTOF10: 8
PAINLEVEL_OUTOF10: 7
PAINLEVEL_OUTOF10: 8
PAINLEVEL_OUTOF10: 7
PAINLEVEL_OUTOF10: 8
PAINLEVEL_OUTOF10: 7
PAINLEVEL_OUTOF10: 7
PAINLEVEL_OUTOF10: 9

## 2024-09-24 ASSESSMENT — PAIN DESCRIPTION - LOCATION
LOCATION: CHEST

## 2024-09-24 ASSESSMENT — PAIN - FUNCTIONAL ASSESSMENT

## 2024-09-24 ASSESSMENT — COGNITIVE AND FUNCTIONAL STATUS - GENERAL
MOBILITY SCORE: 24
DAILY ACTIVITIY SCORE: 24

## 2024-09-24 ASSESSMENT — PAIN DESCRIPTION - DESCRIPTORS
DESCRIPTORS: ACHING

## 2024-09-24 NOTE — CARE PLAN
The patient's goals for the shift include      The clinical goals for the shift include Pt will be safe, comfortable, and HD stable overnight.      Problem: Pain  Goal: Takes deep breaths with improved pain control throughout the shift  Outcome: Not Progressing     Problem: Pain  Goal: Takes deep breaths with improved pain control throughout the shift  Outcome: Not Progressing     Problem: Pain  Goal: Takes deep breaths with improved pain control throughout the shift  Outcome: Not Progressing  Goal: Turns in bed with improved pain control throughout the shift  Outcome: Not Progressing  Goal: Walks with improved pain control throughout the shift  Outcome: Not Progressing  Goal: Performs ADL's with improved pain control throughout shift  Outcome: Not Progressing  Goal: Participates in PT with improved pain control throughout the shift  Outcome: Progressing  Goal: Free from opioid side effects throughout the shift  Outcome: Progressing  Goal: Free from acute confusion related to pain meds throughout the shift  Outcome: Progressing     Problem: Pain - Adult  Goal: Verbalizes/displays adequate comfort level or baseline comfort level  Outcome: Not Progressing     Problem: Safety - Adult  Goal: Free from fall injury  Outcome: Progressing     Problem: Chronic Conditions and Co-morbidities  Goal: Patient's chronic conditions and co-morbidity symptoms are monitored and maintained or improved  Outcome: Progressing     Problem: Fall/Injury  Goal: Not fall by end of shift  Outcome: Progressing  Goal: Be free from injury by end of the shift  Outcome: Progressing  Goal: Verbalize understanding of personal risk factors for fall in the hospital  Outcome: Progressing  Goal: Verbalize understanding of risk factor reduction measures to prevent injury from fall in the home  Outcome: Progressing  Goal: Use assistive devices by end of the shift  Outcome: Progressing  Goal: Pace activities to prevent fatigue by end of the shift  Outcome:  Progressing       Problem: Pain  Goal: Takes deep breaths with improved pain control throughout the shift  Outcome: Not Progressing  Goal: Turns in bed with improved pain control throughout the shift  Outcome: Not Progressing  Goal: Walks with improved pain control throughout the shift  Outcome: Not Progressing  Goal: Performs ADL's with improved pain control throughout shift  Outcome: Not Progressing     Problem: Pain - Adult  Goal: Verbalizes/displays adequate comfort level or baseline comfort level  Outcome: Not Progressing      [Initial Visit] : an initial visit for [FreeTextEntry2] : right knee pain

## 2024-09-24 NOTE — PROGRESS NOTES
"SUBJECTIVE  No overnight events or PRNs required overnight.    Patient was seen at bedside today and reports his pain level has improved since the addition of morphine, which has improved his mood. He continues to deny depression, anxiety, suicidal ideation, or AVH at this time. He states he is sleeping better as well.     He is hopeful given his improvement in pain level and is looking forward to going home when he is ready.     OBJECTIVE    VITALS      9/23/2024     7:55 AM 9/23/2024    12:11 PM 9/23/2024     4:19 PM 9/23/2024     8:28 PM 9/24/2024    12:44 AM 9/24/2024     4:47 AM 9/24/2024     7:24 AM   Vitals   Systolic 112 123 126 106 107 126 108   Diastolic 49 66 60 59 56 57 66   Heart Rate 60 70 59 65 64 60 59   Temp 36.7 °C (98.1 °F) 36.4 °C (97.5 °F) 36.4 °C (97.5 °F) 37 °C (98.6 °F) 36.8 °C (98.2 °F) 36.6 °C (97.9 °F) 36.5 °C (97.7 °F)   Resp 16 16 16 16 16 16 16   Visit Report Report Report Report Report           MENTAL STATUS EXAM  Appearance: black male, appears stated age, laying in bed in NAD under hospital blanket.  Attitude: cooperative, engaged with treatment team.   Behavior: laying on his side, improved eye contact relative to yesterday.  Motor Activity: no tremors or EPS noted. No psychomotor retardation or agitation present.  Speech: speaks softly, normal rate and rhythm and tone.  Mood: \"fine\"  Affect: somewhat withdrawn yet hopeful but overall appropriate to situation  Thought Process: linear, logical, coherent.  Thought Content:  denies SI/HI, focused on pain not improving. Hopeful, future oriented.  Thought Perception: does not spontaneously endorse AVH, does not appear internally stimulated.  Cognition: AOx4  Insight: adequate  Judgement: adequate in that he is compliant with care.    CURRENT MEDICATIONS  Scheduled medications  baclofen, 5 mg, oral, TID  flutamide, 125 mg, oral, TID  folic acid, 1 mg, oral, Daily  gabapentin, 100 mg, oral, q8h REYNALDO  ketoconazole, 200 mg, oral, " BID  oxygen, , inhalation, Continuous - Inhalation  pantoprazole, 40 mg, intravenous, Daily  polyethylene glycol, 17 g, oral, Daily  predniSONE, 5 mg, oral, Daily  pseudoephedrine, 60 mg, oral, q8h  sennosides-docusate sodium, 2 tablet, oral, BID        PRN medications  PRN medications: butalbital-acetaminophen-caff, diphenhydrAMINE, [Held by provider] HYDROmorphone, morphine, ondansetron ODT **OR** [DISCONTINUED] ondansetron, [Held by provider] oxyCODONE, oxygen     LABS  Results for orders placed or performed during the hospital encounter of 09/12/24 (from the past 24 hour(s))   ECG 12 Lead   Result Value Ref Range    Ventricular Rate 66 BPM    Atrial Rate 66 BPM    IL Interval 192 ms    QRS Duration 106 ms    QT Interval 394 ms    QTC Calculation(Bazett) 413 ms    P Axis 48 degrees    R Axis 64 degrees    T Axis 54 degrees    QRS Count 11 beats    Q Onset 215 ms    P Onset 119 ms    P Offset 179 ms    T Offset 412 ms    QTC Fredericia 407 ms   CBC and Auto Differential   Result Value Ref Range    WBC 9.7 4.4 - 11.3 x10*3/uL    nRBC 2.9 (H) 0.0 - 0.0 /100 WBCs    RBC 3.27 (L) 4.50 - 5.90 x10*6/uL    Hemoglobin 9.4 (L) 13.5 - 17.5 g/dL    Hematocrit 28.4 (L) 41.0 - 52.0 %    MCV 87 80 - 100 fL    MCH 28.7 26.0 - 34.0 pg    MCHC 33.1 32.0 - 36.0 g/dL    RDW 18.7 (H) 11.5 - 14.5 %    Platelets 411 150 - 450 x10*3/uL    Neutrophils % 47.3 40.0 - 80.0 %    Immature Granulocytes %, Automated 0.6 0.0 - 0.9 %    Lymphocytes % 31.2 13.0 - 44.0 %    Monocytes % 12.6 2.0 - 10.0 %    Eosinophils % 8.0 0.0 - 6.0 %    Basophils % 0.3 0.0 - 2.0 %    Neutrophils Absolute 4.58 1.20 - 7.70 x10*3/uL    Immature Granulocytes Absolute, Automated 0.06 0.00 - 0.70 x10*3/uL    Lymphocytes Absolute 3.02 1.20 - 4.80 x10*3/uL    Monocytes Absolute 1.22 (H) 0.10 - 1.00 x10*3/uL    Eosinophils Absolute 0.78 (H) 0.00 - 0.70 x10*3/uL    Basophils Absolute 0.03 0.00 - 0.10 x10*3/uL   Comprehensive Metabolic Panel   Result Value Ref Range     Glucose 82 74 - 99 mg/dL    Sodium 140 136 - 145 mmol/L    Potassium 3.7 3.5 - 5.3 mmol/L    Chloride 103 98 - 107 mmol/L    Bicarbonate 29 21 - 32 mmol/L    Anion Gap 12 10 - 20 mmol/L    Urea Nitrogen 6 6 - 23 mg/dL    Creatinine 0.66 0.50 - 1.30 mg/dL    eGFR >90 >60 mL/min/1.73m*2    Calcium 9.5 8.6 - 10.6 mg/dL    Albumin 4.4 3.4 - 5.0 g/dL    Alkaline Phosphatase 131 (H) 33 - 120 U/L    Total Protein 6.7 6.4 - 8.2 g/dL    AST 44 (H) 9 - 39 U/L    Bilirubin, Total 4.0 (H) 0.0 - 1.2 mg/dL    ALT 37 10 - 52 U/L   Lactate Dehydrogenase   Result Value Ref Range     (H) 84 - 246 U/L   Reticulocytes   Result Value Ref Range    Retic % 12.9 (H) 0.5 - 2.0 %    Retic Absolute 0.421 (H) 0.022 - 0.118 x10*6/uL    Reticulocyte Hemoglobin 30 28 - 38 pg    Immature Retic fraction 17.4 (H) <=16.0 %   Magnesium   Result Value Ref Range    Magnesium 2.22 1.60 - 2.40 mg/dL        IMAGING  ECG 12 Lead    Result Date: 9/24/2024  Normal sinus rhythm Voltage criteria for left ventricular hypertrophy Abnormal ECG When compared with ECG of 20-SEP-2024 10:13, (unconfirmed) T wave inversion no longer evident in Inferior leads      PSYCHIATRIC RISK ASSESSMENT  Violence Risk Factors:  male  Acute Risk of Harm to Others is Considered: Low  Suicide Risk Factors: male, chronic medical illness, and chronic pain  Protective Factors: sense of responsibility towards family, social support/connectedness, and positive family relationships  Acute Risk of Harm to Self is Considered: Low     ASSESSMENT AND PLAN  Joellen Narayan is a 23 y.o. 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  "Psychiatry was consulted on 9/23 for concern of depression and anxiety.      On initial assessment, Ti cites his primary complaint as his pain that is not improving-stating that his previous care plans were more helpful. He denies depression, anxiety, or suicidal ideation at this time and is not interested in psychiatric intervention. On exam, he is withdrawn but linear, logical and coherent in thought process-as well as cooperative with treatment team. Clinical picture appears to be consistent with mood disturbance in the context of current acute pain crisis.     Update 9/24) Patient states pain has improved since change in pain regimen and mood has improved overall. Reached out to Sickle Cell SW-Archana Pratt, who clarified mother is NOT guardian, only HCPOA. She also was made aware about lack of care plan and plans to schedule him for outpatient follow up to develop care plan. No changes to plan-patient mood improved with change to pain medication regimen. Recommend follow up as detailed below to create updated care plan. Psychiatry to sign off.     IMPRESSION  #Mood disturbance in the context of current acute pain crisis.     RECOMMENDATIONS  Safety:  - Patient does not currently meet criteria for inpatient psychiatric admission.  -To evaluate decision-making capacity, recommend use of the Capacity Evaluation Tool. Search “ IP Capacity Evaluation under SmartText\" unless the patient has a legal guardian, in which case all decisions per the legal guardian.  - Patient does not require a 1:1 sitter from a psychiatric perspective at this time.  - Defer to primary team decision for 1:1 sitter.  - As with all hospitalized patients, would recommend delirium precautions, as below.       Work-up:  -None at this time.     Medications:  -None at this time.     Ancillary Services:  - Recommend , pet/music/art therapy as desired by patient.     Follow-up:  - Follow up outpatient to get updated Acute Care Plan for " Sickle Cell disease with Dr. Reid and Archana Pratt.     - Discussed recommendations with primary team.  - Psychiatry to sign off.      Thank you for allowing us to participate in the care of this patient. Please page [phone] with any questions or concerns.     Patient staffed with attending psychiatrist, Dr. Fraser.      Medication Consent  Medication Consent: n/a; consult service     Araceli Sinha MD   Psychiatry PGY2    DELIRIUM RECS:   -- Minimize use of benzos, opiates, anticholinergics, as these may worsen mental status   -- Would use caution with narcotic pain medications   -- Would still adequately controlling pain, as uncontrolled pain is also a risk factor for delirium   -- Reinforce sleep hygiene; encourage patient to stay awake during the day   -- Keep curtains/blinds open during the day and closed at night.   -- Would recommend reorienting/redirecting patient as much as possible,    -- Aim for consistent staffing, familiar objects, avoiding bright lights and loud noises, etc.

## 2024-09-24 NOTE — PROGRESS NOTES
"Joellen Narayan is a 23 y.o. male on day 11 of admission presenting with Sickle cell crisis (Multi).    Subjective   Joellen is doing fine this morning. He states the IV morphine is helping his pain a lot more than the IV dilaudid. We dicussed keeping his pain regimen the same today. Pt states his pain is still present but tolerable. Admits to the same chest pain, denies SOB, N/V/D/C, fever, chills, priapism. ROS: A complete review of systems was performed and is negative except for as mentioned above in the HPI        Objective     Physical Exam  Constitutional:       General: He is not in acute distress.     Appearance: He is not toxic-appearing.   HENT:      Head: Normocephalic and atraumatic.   Eyes:      General: Scleral icterus present.      Extraocular Movements: Extraocular movements intact.   Cardiovascular:      Rate and Rhythm: Normal rate and regular rhythm.   Pulmonary:      Effort: No respiratory distress.   Abdominal:      Tenderness: There is abdominal tenderness.   Musculoskeletal:         General: No swelling or tenderness.   Skin:     Coloration: Skin is not jaundiced.      Findings: No bruising or erythema.   Neurological:      Mental Status: He is oriented to person, place, and time. Mental status is at baseline.         Last Recorded Vitals  Blood pressure 108/66, pulse 59, temperature 36.5 °C (97.7 °F), temperature source Temporal, resp. rate 16, height 1.88 m (6' 2\"), weight 69.4 kg (153 lb), SpO2 97%.  Intake/Output last 3 Shifts:  I/O last 3 completed shifts:  In: 780 (11.2 mL/kg) [P.O.:780]  Out: 2200 (31.7 mL/kg) [Urine:2200 (0.9 mL/kg/hr)]  Weight: 69.4 kg         Assessment/Plan   Assessment & Plan  Sickle cell crisis (Multi)    Joellen Narayan is a 23 y.o. 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 CXR (9/12) negative for acute cardiopulmonary process. Urology following patient for priapism, upon presentation, patient did not agree to bedside drainage and corporal blood gas. 9/13 started on Ketoconazole 200mg BID and prednisone 5mg daily per urology recs. Penile doppler US ordered 9/15 per urology, showing normal caliber and peak systolic velocities of b/l cavernosal arteries which have improved since prior. Hematology consulted on 9/13 give persistent priapism x1 month, recommend emergent RBCex. 9/13 plan for apheresis line placement in the ED followed by emergent RBCex, however, patient declined placement of line in ED on 9/13, plan for apheresis line placement with IR followed by RBCex on 9/16. On 9/16 he then again declined apheresis line placement and RBCEx. Pain was stable x2 days until again 9/18 pt endorsed significant groin pain, penis still bendable. Discussed with urology, pt to be re-evaluated. Tbili up to 8.0 (9/18), 11.1 (9/19); RUQ US with cholelithiasis without cholecystitis (9/18). Pt became amenable to RBCEx, s/p apheresis line 9/18 and s/p RBC exchange pt 9/19. On 9/20, lysis labs improving post exchange. Apheresis line removed, EKG, CXR and troponin were unremarkable. Urology re-engaged on 9/20 for continued groin/shaft pain localized around site from recent urology intervention with minimal improvement clinically since RBCex; no interventions needed. 9/22 developed epigastric pain, given recent choledocholithiasis, amylase and lipase sent (negative), troponin and CXR also negative 9/21. S/p Rotating PO oxycodone and IVP dilaudid; (9/23-current) started IV morphine PRN for pain management. Discharge pending improvement in  pain and stability of priapism.       # Priapism  - Presented to the ED with worsening priapism since 5pm (hadn't resolved from last admission)  - s/p recent admission 8/31-9/9, OR with urology for priapism drainage, penile block, and corpora cavernosa irrigation  - Also, s/p scheduled Sudafed q8h and added ibuprofen, baclofen 5mg TID and gabapentin 100mg TID  - Penile doppler US while prior inpatient (9/1) showing small caliber of bilateral cavernosal arteries with lack of color flow in portions of both arteries, more on the right, c/w low-flow/ischemic priapism. Urology notified, s/p phenylephrine injection with urology 9/1 evening with partial resolution of priapism  - Reassessed by Urology 9/7/24 reevaluate penile appearance; firm but still bendable with no increased pain. Rec to continue management as prior and to maintain outpatient appointment on 9/10/24.  - Outpatient appointment 9/10 prescribed Flutamide 125mg TID but per parent, no pharmacy has this medication so it was not started  - Doppler U/S completed outpatient 9/10 noted arterial flow  - Urology following rec bedside drainage and corporal blood gas and phenylepinephine injection --patient refusing; also rec starting treatment with ketoconazole with prednisone, 9/15 rec penile doppler US    - Penile doppler US (9/15) per urology, showing normal caliber and peak systolic velocities of b/l cavernosal arteries which have improved since prior.   - Heme following and rec emergent RBCex in setting of prolonged priapism  - Continue home Sudafed q8h prn and added ibuprofen, Baclofen 5mg TID and Gabapentin 100mg TID  - 9/13 started on ketoconazole 200mg BID and Prednisone 5mg daily-per urology recs pt to be dc'd with this  - C. Trachomatis/N. Gonorrhoeae (9/14): negative   - Initially planned for aphesis line placement followed by RBCex on 9/13, however, patient declined placement of apheresis line in the ED, primary, hematology and urology team explained  risks to patient and he continued to decline   - Planed for apheresis line placement in IR on 9/16 followed by RBCex - hematology, Dr. Cruz, and trransfusion med team aware--> however on 9/16 pt declined apheresis line placement and RBCEx again  - 9/16 pt was educated on risks of refusing/delaying medical care and recurrent priapism  - Pain was stable x2 days until again 9/18 pt endorsed significant groin pain, penis still bendable. Discussed with urology, pt to be re-evaluated.   - RUQ US with cholelithiasis without cholecystitis (9/18)   - Pt amenable to RBCEx, IR placed apheresis line 9/18 and s/p RBCex 9/19   - Tbili increased to 8.0 (9/18) --> 11.1 (9/19) --> 6.3 (9/20) s/p RBCex   - On 9/19, patient reporting slight improvement of groin pain, mild RLQ tenderness  - Urology re-engaged on 9/20 for continued groin/shaft pain localized around site from recent urology intervention with minimal improvement clinically since RBCex; no interventions at this time      # Hgb SS with severe pain   - OARRS reviewed, no aberrancies  - No current care path  - Hgb baseline ~8.5; currently stable, no indication for simple transfusion   - Tbili baseline fluctuates ~5-1; Tbili 4.4 on 9/16, up to 6.0 on 9/17. 9/24 tbili 4.0  - LDH baseline ~500   on 9/16. 9/24 [id number]  - s/p IV Dilaudid 2mg q2hrs PRN severe pain (9/12-9/16)  - 9/16 weaned pain meds to IV dilaudid 1mg q3hrs PRN severe pain  - Overnight patient lost IV access, refused subcutaneous dilaudid but since pain is improving agreeable to start 20mg PO oxycodone Q4 hours for severe pain (9/19)   - given uncontrolled pain, s/p patient switched to rotating PO oxycodone 15mg and IVP dilaudid 2mg q2h (9/19-9/23)  - (9/23-current) pt's pain was uncontrollable overnight, pt previously on IV morphine. Pt was started on 10mg IV morphine q2h PRN for severe pain  - Continue folic acid 1mg daily  - Hgb S (9/12): 59.8%   - Repeat Hgb S (9/18): 59.9%  - Hgb S post exchange 19.5%    - Apheresis line removed, CXR and troponin unremarkable (ordered d/t chest pain after line was removed. EKG NSR.   - PO Zofran PRN for opioid-induced nausea, PO Benadryl PRN for opioid-induced pruritus, Bowel regimen for opioid-induced constipation with DocuSenna 2tabs BID and Miralax daily  - Psychiatry was consulted (9/23) for anxiety, rec no medications at this time and signed off  - Integrative medicine following    # Recent Choledocholithiasis  # Epigastric pain   - s/p ERCP 7/18/24 with a biliary sphincterotomy where sludge and stones were removed, achieving complete clearance  - Seen by gen surg 8/8/24 Dr. Dove who rec lap cholecystectomy d/t the chance of recurrence of choledocholithiasis  - Surgery has yet to be scheduled, has FUV with gen surg again 9/30  - Tbili 4.4 on 9/16, (recent peak at 52.8 prior to ERCP during recent hospital admission); increased to 8.9 (9/18) --> 11.1 (9/19) --> 6.3 (9/20) s/p RBCex   - RUQ US with cholelithiasis without cholecystitis (9/18)   - pt reports epigastric pain 9/22, PPI changed to IV   - amylase and lipase negative (9/22)  - CXR 9/21 negative for acute process, troponin negative   - low threshold for CT AP if worsening pain, though improving as of 9/23      # Nodular lymphocyte predominant Hodgkins lymphoma (NLPHL)   - Primary Oncologist: Dr. Stoll  - Enlarged lymph nodes noted 4/1/22  - RUQ US (11/14/22) with mildly enlarged LNs in the region of the kavin hepatis  - MRI liver (11/16/22) showed re-demonstration of bulky retroperitoneal lymphadenopathy and kavin hepatic lymphadenopathy    - (11/18/22) lymph node biopsy showed atypical lymphoid infiltrate. Reviewed by Hemepath board, insufficient for lymphoma diagnosis  - PET/CT (12/6/22) showing retroperitoneal lymphadenopathy  - Followed up with Dr. Stoll (12/16/22) with plan for surg/onc consult for large tissue bx but patient missed apt and was lost to follow up  - Requested new apt with Dr. Ronnie Marte 6/19/23,  "patient was not seen and lost to follow up  - CT a/p (11/28/23) increased size of retroperitoneal lymph nodes reflecting extramedullary hematopoiesis I/s/o sickle cell vs lymphoma  - Paraaortic LN bx via IR (11/30/23) consistent with NLPHL. Flow: no clonal B cell or T cell population identified, lymphocyte 95%, CD3+CD4+ 68%, CD3+CD8+ 7%, CD19+ 24%  - Elevated LDH/bili partially from sickle cell disease   - Chemotherapy (R-CHOP) was discussed with primary oncologist Dr. Stoll, and decision was made to simplify his chemotherapy to Rituxan and prednisone q3 weeks mainly due to frequent sickle cell crisis  - Current chemo regimen: Rituxan and prednisone q3 weeks (C1 1/18/24, C2 2/8/24, Missed C3 d/t sickle cell pain crisis, C4 4/4/24, C5 5/16/24, C6 6/7/24)  - Per pt no longer taking Acyclovir 400mg BID prophy   - PET CT (7/25) overall showing great response to treatment with persistent residual viable disease involving a left common iliac node  - Last FUV with Dr. Stoll 7/25/24 rec RTC in 2 months (next FUV scheduled for 9/19). Requested reschedule for appointment     # Hx of nocturnal oxygen dependence and hypoxia on room air  - Pt currently has home 2L supp O2   - Wean as able, encourage incentive spirometry  - Pulm FUV 8/8- \"Given his history of sickle cell disease, it is important to ensure he has formal sleep testing to look at desaturations and presence of sleep apnea despite not having a convincing history/body habitus typical for suspecting sleep apnea- patients with sickle cell have a higher prevalence of sleep disorders including nocturnal hypoxemia\"--> rec sleep referral for formal testing if deemed appropriate, ABG and O2 destat testing, and TTE with bubble study  - TTE 8/23 showing EF 60-65%, severely dilated LV and LA, + intrapulmonary shunting       DVT prophy: Lovenox subcutaneous, SCDs, encourage ambulation     DISPO:  - Full code, confirmed on admit  - Discharge pending improvement in pain and " stability of priapism and RBCex   - NOK Melissa Narayan (Parent) [phone] - updated by phone 9/24  - Sickle cell FUV 9/18 (pt to reschedule on own), Dr. Stoll FUV 9/19, Gen Surg FUV 9/30, Urology FUV 10/7    I spent >60 minutes in the professional and overall care of this patient.    Assessment and plan discussed with attending physician Dr. Soheila Couch PA-C

## 2024-09-24 NOTE — CARE PLAN
The patient's goals for the shift include      Problem: Pain  Goal: Takes deep breaths with improved pain control throughout the shift  Outcome: Progressing  Goal: Turns in bed with improved pain control throughout the shift  Outcome: Progressing  Goal: Walks with improved pain control throughout the shift  Outcome: Progressing  Goal: Performs ADL's with improved pain control throughout shift  Outcome: Progressing  Goal: Participates in PT with improved pain control throughout the shift  Outcome: Progressing  Goal: Free from opioid side effects throughout the shift  Outcome: Progressing  Goal: Free from acute confusion related to pain meds throughout the shift  Outcome: Progressing     Problem: Pain - Adult  Goal: Verbalizes/displays adequate comfort level or baseline comfort level  Outcome: Progressing     Problem: Safety - Adult  Goal: Free from fall injury  Outcome: Progressing     Problem: Discharge Planning  Goal: Discharge to home or other facility with appropriate resources  Outcome: Progressing     Problem: Chronic Conditions and Co-morbidities  Goal: Patient's chronic conditions and co-morbidity symptoms are monitored and maintained or improved  Outcome: Progressing     Problem: Fall/Injury  Goal: Not fall by end of shift  Outcome: Progressing  Goal: Be free from injury by end of the shift  Outcome: Progressing  Goal: Verbalize understanding of personal risk factors for fall in the hospital  Outcome: Progressing  Goal: Verbalize understanding of risk factor reduction measures to prevent injury from fall in the home  Outcome: Progressing  Goal: Use assistive devices by end of the shift  Outcome: Progressing  Goal: Pace activities to prevent fatigue by end of the shift  Outcome: Progressing     The clinical goals for the shift include pt will report pain score less than 7/10 throughout shift.

## 2024-09-24 NOTE — PROGRESS NOTES
09/16/24 1000   Discharge Planning   Who is requesting discharge planning? Provider   Home or Post Acute Services None   Expected Discharge Disposition Home   Does the patient need discharge transport arranged? No     9/16/24 @ 1050  Patient admitted for chest and penile pain due to sickle cell crisis. Urology following patient. Patient to get exchanged today per team. Anticipate no needs at discharge. Will continue to follow patient during his hospital stay for any needs that may arise.  MARIELA Lo    9/19/24 @ 1155  Patient to get exchanged today. At this time, patient remains no needs at discharge. Will continue to follow.  MARIELA Lo    9/20/24 @ 1220  Patient active with [facility] for his home 3L nocturnal oxygen.  He has no portability. Per team, patient using oxygen during the day during this stay. CARLYLE Arana NP to order walking pulse ox prior to discharge to see if patient qualifies for continuous oxygen. Referral placed in [facility] to [facility].  Will continue to follow patient. ADOD this weekend.    UPDATE 1225:  Per [facility], patient is active for 2L cont as of 6/25/24. CARLYLE Arana NP made aware.  MARIELA Lo    9/24/24 @ 1030  Per team, discharge pending pain improvement. Patient to resume home O2 with [facility]. ADOD today vs tomorrow.   Mari Ko RN [facility]

## 2024-09-24 NOTE — PROGRESS NOTES
Acupuncture Visit:     Joellen Narayan was referred by Brooklyn Last  .  Session Information  Discipline: Acupuncture  Session Start Time: 1410  Session End Time: 1440  Visit Type: New patient  Medical History Reviewed: I have reviewed pertinent medical history in EHR, and no contraindications are present to provide treatment  History of Present Illness: Pt reporting chest pain and rib pain  Number of family members present: 0  Number of staff members present: 0    Pre-treatment Assessment  Pain Score: 8  Anxiety Level (0-10): 3  Stress Level (0-10): 1  Coping Level (0-10): 8  Depression Level (0-10): 1  Fatigue Level (0-10): 8  Nausea Level (0-10): 0  Wellbeing Level (0-10): 9    Pain Assessment  Pain Type: Acute pain  Pain Location: Chest  Pain Orientation: Right, Left  Pain Descriptors: Aching  Pain Frequency: Constant/continuous  Pain Onset: Ongoing  Clinical Progression: Not changed  Pain Interventions: Medication (See MAR)    Acupuncture Physical Exam  Tongue Color: Pale body  Tongue Shape: Thin  Tongue Coating: No coating  Pulse: Weak    Provider reviewed plan for the acupuncture session, precautions and contraindications. Patient/guardian/hospital staff has given consent to treat with full understanding of what to expect during thesession. Before acupuncture began, provider explained to the patient to communicate at any time if the procedure was causing discomfort past their tolerance level. Patient agreed to advise acupuncturist. The acupuncturist counseled the patient on the risks of acupuncture treatment including pain, infection, bleeding, and no relief of pain. The patient was positioned comfortably. There was no evidence of infection at the site of needle insertions.       No annotated images are attached to the encounter.    Acupuncture Treatment  Patient Position: Bed, Supine  Acupuncture Needling: No  Other Techniques Utilized: Acupressure (CNT Darlene)  Acupressure Description: LR 4, 8, K 6, 3, st  murphy gutiérrez, lr 14,  Total Face to Face Time (min): 30 minutes    Post-treatment Assessment  0-10 (Numeric) Pain Score: 7  Anxiety Level (0-10): 0  Stress Level (0-10): 1  Coping Level (0-10): 8  Depression Level (0-10): 0  Fatigue Level (0-10): 7  Nausea Level (0-10): 0  Wellbeing Level (0-10): 9    Evaluation/Recommendation/Follow-up  Total Session Time (min): 30 minutes      Massage Therapy / Acupuncture Note:

## 2024-09-24 NOTE — PROGRESS NOTES
"Joellen Narayan is a 23 y.o. male on day 11 of admission presenting with Sickle cell crisis (Multi).    Subjective   Pt resting in bed, reporting increased pain in chest and right flank. Completed reiki with pt today. Pt completed body work and acupuressure by acupuncturist today.    Objective     Physical Exam  Vitals reviewed.   Constitutional:       General: He is not in acute distress.     Appearance: Normal appearance. He is normal weight. He is ill-appearing.      Interventions: Nasal cannula in place.   HENT:      Head: Normocephalic and atraumatic.      Nose: Nose normal.      Mouth/Throat:      Mouth: Mucous membranes are moist.   Eyes:      Extraocular Movements: Extraocular movements intact.      Pupils: Pupils are equal, round, and reactive to light.   Cardiovascular:      Rate and Rhythm: Normal rate and regular rhythm.   Pulmonary:      Effort: Pulmonary effort is normal.   Abdominal:      General: Abdomen is flat.      Palpations: Abdomen is soft.   Skin:     General: Skin is warm and dry.   Neurological:      General: No focal deficit present.      Mental Status: He is alert and oriented to person, place, and time. Mental status is at baseline.   Psychiatric:         Mood and Affect: Mood normal.         Behavior: Behavior normal.         Thought Content: Thought content normal.         Judgment: Judgment normal.         Last Recorded Vitals  Blood pressure 111/61, pulse 58, temperature 36.6 °C (97.9 °F), temperature source Temporal, resp. rate 16, height 1.88 m (6' 2\"), weight 69.4 kg (153 lb), SpO2 98%.  Intake/Output last 3 Shifts:  I/O last 3 completed shifts:  In: 780 (11.2 mL/kg) [P.O.:780]  Out: 2200 (31.7 mL/kg) [Urine:2200 (0.9 mL/kg/hr)]  Weight: 69.4 kg     Relevant Results  Scheduled medications  baclofen, 5 mg, oral, TID  flutamide, 125 mg, oral, TID  folic acid, 1 mg, oral, Daily  gabapentin, 100 mg, oral, q8h REYNALDO  ketoconazole, 200 mg, oral, BID  oxygen, , inhalation, Continuous - " Inhalation  pantoprazole, 40 mg, intravenous, Daily  polyethylene glycol, 17 g, oral, Daily  predniSONE, 5 mg, oral, Daily  pseudoephedrine, 60 mg, oral, q8h  sennosides-docusate sodium, 2 tablet, oral, BID      Continuous medications     PRN medications  PRN medications: butalbital-acetaminophen-caff, diphenhydrAMINE, [Held by provider] HYDROmorphone, morphine, ondansetron ODT **OR** [DISCONTINUED] ondansetron, [Held by provider] oxyCODONE, oxygen    Results for orders placed or performed during the hospital encounter of 09/12/24 (from the past 24 hour(s))   CBC and Auto Differential   Result Value Ref Range    WBC 9.7 4.4 - 11.3 x10*3/uL    nRBC 2.9 (H) 0.0 - 0.0 /100 WBCs    RBC 3.27 (L) 4.50 - 5.90 x10*6/uL    Hemoglobin 9.4 (L) 13.5 - 17.5 g/dL    Hematocrit 28.4 (L) 41.0 - 52.0 %    MCV 87 80 - 100 fL    MCH 28.7 26.0 - 34.0 pg    MCHC 33.1 32.0 - 36.0 g/dL    RDW 18.7 (H) 11.5 - 14.5 %    Platelets 411 150 - 450 x10*3/uL    Neutrophils % 47.3 40.0 - 80.0 %    Immature Granulocytes %, Automated 0.6 0.0 - 0.9 %    Lymphocytes % 31.2 13.0 - 44.0 %    Monocytes % 12.6 2.0 - 10.0 %    Eosinophils % 8.0 0.0 - 6.0 %    Basophils % 0.3 0.0 - 2.0 %    Neutrophils Absolute 4.58 1.20 - 7.70 x10*3/uL    Immature Granulocytes Absolute, Automated 0.06 0.00 - 0.70 x10*3/uL    Lymphocytes Absolute 3.02 1.20 - 4.80 x10*3/uL    Monocytes Absolute 1.22 (H) 0.10 - 1.00 x10*3/uL    Eosinophils Absolute 0.78 (H) 0.00 - 0.70 x10*3/uL    Basophils Absolute 0.03 0.00 - 0.10 x10*3/uL   Comprehensive Metabolic Panel   Result Value Ref Range    Glucose 82 74 - 99 mg/dL    Sodium 140 136 - 145 mmol/L    Potassium 3.7 3.5 - 5.3 mmol/L    Chloride 103 98 - 107 mmol/L    Bicarbonate 29 21 - 32 mmol/L    Anion Gap 12 10 - 20 mmol/L    Urea Nitrogen 6 6 - 23 mg/dL    Creatinine 0.66 0.50 - 1.30 mg/dL    eGFR >90 >60 mL/min/1.73m*2    Calcium 9.5 8.6 - 10.6 mg/dL    Albumin 4.4 3.4 - 5.0 g/dL    Alkaline Phosphatase 131 (H) 33 - 120 U/L     Total Protein 6.7 6.4 - 8.2 g/dL    AST 44 (H) 9 - 39 U/L    Bilirubin, Total 4.0 (H) 0.0 - 1.2 mg/dL    ALT 37 10 - 52 U/L   Lactate Dehydrogenase   Result Value Ref Range     (H) 84 - 246 U/L   Reticulocytes   Result Value Ref Range    Retic % 12.9 (H) 0.5 - 2.0 %    Retic Absolute 0.421 (H) 0.022 - 0.118 x10*6/uL    Reticulocyte Hemoglobin 30 28 - 38 pg    Immature Retic fraction 17.4 (H) <=16.0 %   Magnesium   Result Value Ref Range    Magnesium 2.22 1.60 - 2.40 mg/dL     ECG 12 Lead    Result Date: 9/24/2024  Normal sinus rhythm Voltage criteria for left ventricular hypertrophy Abnormal ECG When compared with ECG of 20-SEP-2024 10:13, (unconfirmed) T wave inversion no longer evident in Inferior leads    ECG 12 Lead    Result Date: 9/24/2024  Normal sinus rhythm Voltage criteria for left ventricular hypertrophy Abnormal ECG When compared with ECG of 20-SEP-2024 10:13, (unconfirmed) No significant change was found    ECG 12 lead    Result Date: 9/22/2024  Normal sinus rhythm Rightward axis Nonspecific T wave abnormality Abnormal ECG When compared with ECG of 23-AUG-2024 20:17, No significant change was found Confirmed by Theo Hassan ([id number]) on 9/22/2024 2:47:13 PM    XR chest 1 view    Result Date: 9/21/2024  Interpreted By:  Rogelio Tsang and Ohs Zachary STUDY: XR CHEST 1 VIEW;  9/20/2024 6:34 pm   INDICATION: Signs/Symptoms:sickle cell, increased pain after removal of trialysis line today.   COMPARISON: Chest radiograph 09/20/2024, CT abdomen/pelvis 08/23/2024.   ACCESSION NUMBER(S): [id number]   ORDERING CLINICIAN: DAE LOPEZ   FINDINGS: AP radiograph of the chest was provided.   MEDICAL DEVICES: None.   CARDIOMEDIASTINAL SILHOUETTE: Cardiomediastinal silhouette is normal in size and configuration.   LUNGS: No pneumothorax, pleural effusion or focal consolidation.   ABDOMEN: No remarkable upper abdominal findings.   BONES: No acute osseous changes.       1.  No evidence of acute  cardiopulmonary process.   I personally reviewed the images/study and I agree with the findings as stated by Dr. Lorne Martinez. This study was interpreted at [facility], [place], [place].   MACRO: None   Signed by: Rogelio Tsang 9/21/2024 1:15 PM Dictation workstation:   [id number]    XR chest 1 view    Result Date: 9/20/2024  Interpreted By:  Allyn Woods, STUDY: XR CHEST 1 VIEW;  9/20/2024 11:25 am   INDICATION: Signs/Symptoms:r/o pneumothorax.     COMPARISON: 09/12/2024   ACCESSION NUMBER(S): [id number]   ORDERING CLINICIAN: DONNA MORGAN   FINDINGS:         CARDIOMEDIASTINAL SILHOUETTE: Cardiomediastinal silhouette is normal in size and configuration.   LUNGS: Lungs are clear. No evidence of a pneumothorax. Costophrenic angles are clear   ABDOMEN: No remarkable upper abdominal findings.   BONES: No acute osseous changes.       1.  No evidence of acute cardiopulmonary process.       MACRO: None   Signed by: Allyn Woods 9/20/2024 1:19 PM Dictation workstation:   [id number]    US right upper quadrant    Result Date: 9/19/2024  Interpreted By:  Kasprzak, Jamshid,  and Ben Heidi STUDY: US RIGHT UPPER QUADRANT;  9/18/2024 3:26 pm   INDICATION: Signs/Symptoms:c/f cholecystitis.     COMPARISON: CT abdomen pelvis 08/23/2024   ACCESSION NUMBER(S): [id number]   ORDERING CLINICIAN: ROSEANNA SOSA   TECHNIQUE: Multiple images of the right upper quadrant were obtained.  This examination was interpreted at [facility].   FINDINGS: LIVER: The liver measures 19.2 cm and is grossly unremarkable and free of any focal lesions.   GALLBLADDER: The gallbladder is nondistended, and demonstrates no evidence of wall thickening or surrounding fluid. The gallbladder wall thickness is 0.2 cm. Sonographic Roman's sign is negative.   Multiple echogenic gallstones within the gallbladder lumen posterior shadowing.   Several rounded nodules in the  pericholecystic region measuring up to 0.9 cm favored to represent nonenlarged lymph nodes.   BILE DUCTS: No evidence of intra or extrahepatic biliary dilatation is identified; the common bile duct measures 0.3 cm.   PANCREAS: The pancreas is poorly visualized due to overlying bowel gas.   RIGHT KIDNEY: The right kidney measures 11.9 cm in length. The renal cortical echogenicity and thickness are within normal limit.  No hydronephrosis or renal calculi are seen.       1. Cholelithiasis without sonographic evidence of acute cholecystitis. 2. Mild hepatomegaly.   I personally reviewed the image(s)/study and resident interpretation as stated by Dr. Heidi Contreras MD. I agree with the findings as stated. This study was interpreted at [facility], [place], [place].   MACRO: None   Signed by: Jamshid Enrique 9/19/2024 7:40 AM Dictation workstation:   [id number]    IR CVC nontunneled    Result Date: 9/18/2024  Interpreted By:  Rakesh Pablo and Ritchie Brandon STUDY: IR CVC NONTUNNELED;  9/18/2024 2:24 pm   INDICATION: Signs/Symptoms:apheresis line placement for RBCEx.   COMPARISON: None.   ACCESSION NUMBER(S): [id number]   ORDERING CLINICIAN: ROSEANNA SOSA   TECHNIQUE: INTERVENTIONALIST(S): MD Vega Hinds DO, PGY-3   CONSENT: The patient was informed of the nature of the proposed procedure. The purposes, alternatives, risks, and benefits were explained and discussed. All questions were answered and consent was obtained.   RADIATION EXPOSURE: Fluoroscopy time: 0.1 min Dose: 0.54 mGy Dose Area Product (DAP): 122 mGy*cm^2   SEDATION: Moderate conscious IV sedation services (supervision of administration, induction, and maintenance) were provided by the physician performing the procedure with intravenous fentanyl 150mcg and versed 3mg for 20 minutes. The physician was assisted by an independent trained observer, an interventional radiology nurse, in the  continuous monitoring of patient level of consciousness and physiologic status. Please note, proximally 100 mcg fentanyl/2 mg Versed were provided via the micro puncture access secondary to IV infiltration.   MEDICATION: None.   TIME OUT: A time out was performed immediately prior to procedure start with the interventional team, correctly identifying the patient name, date of birth, MRN, procedure, anatomy (including marking of site and side), patient position, procedure consent form, relevant laboratory and imaging test results, antibiotic administration, safety precautions, and procedure-specific equipment needs.   COMPLICATIONS: No immediate adverse events identified.   FINDINGS: The patient was positioned supine on the angiography table. The right supraclavicular cutaneous tissues were prepared and draped in sterile manner.  1% lidocaine local anesthesia was instilled into the subcutaneous soft tissues at the selected access site for local anesthesia. Ultrasound images demonstrate a patent and compressible right internal jugular vein. Utilizing direct ultrasound guidance and micropuncture/Seldinger technique, the right internal jugular vein was accessed. An ultrasound digital spot image was acquired and stored on the  PACS. After confirmation of location, a 018 Brutus-Mandrel guidewire was introduced and advanced into the inferior vena cava utilizing intermittent fluoroscopy.   The needle was removed with the guidewire left in place and exchanged for a 5-Macedonian coaxial dilator. The inner dilator and wire were removed and a J-tipped 035 guidewire was introduced through the access sheath.  The skin tract was dilated with successive increase in size of vascular dilators under direct fluoroscopic visualization.   Subsequently, a temporary 11.5 Macedonian x 15 cm catheter was advanced with its tip overlying the cavoatrial junction under direct fluoroscopic guidance.  The catheter ports were aspirated and flushed with  normal saline and charged with heparin. The external portions of the catheter were secured in place with sterile suture dressings.   The patient tolerated the procedure without complication.       1. Technically successful and uncomplicated placement of a right internal jugular temporary apheresis catheter under direct ultrasound and fluoroscopic guidance - optimal catheter tip position at the right atrial superior vena caval junction and the catheter is ready for immediate use.   I was present for and/or performed the critical portions of the procedure and immediately available throughout the entire procedure.   I personally reviewed the image(s) / study and resident interpretation. I agree with the findings as stated.   Performed and dictated at [facility].   MACRO: None.   Signed by: Rakesh Pablo 9/18/2024 2:48 PM Dictation workstation:   [id number]    Vascular US penile artery duplex complete    Result Date: 9/16/2024  Interpreted By:  Nik Edwards and Baker Zachary STUDY: VASC US PENILE ARTERY DUPLEX COMPLETE;   INDICATION: Signs/Symptoms:sickle cell, persistant priapism.   COMPARISON: Ultrasound penile artery duplex on 09/01/2024.   ACCESSION NUMBER(S): [id number]   ORDERING CLINICIAN: DAE LOPEZ   TECHNIQUE: Multiplanar grayscale ultrasound images of the penis were obtained with color and spectral Doppler evaluation.   FINDINGS: Penile anatomy: The corpus cavernosa, corpus spongiosum, and urethra are within normal limits. The tunica albuginea is intact. No focal soft tissue lesion or fluid collection.   Doppler evaluation: Normal caliber of the bilateral cavernosal arteries, improved compared to prior exam. Intact arterial and venous flow within the bilateral corpus cavernosa, improved compared to prior exam. Flow is noted within the deep dorsal vein. The superficial dorsal vein was not definitively visualized on this exam. Flow is noted within  the dorsal arteries bilaterally. The resistive indices in the right and left cavernosal arteries are 1.0 and 0.96 respectively. The velocities within the right and left cavernosal arteries are 88.1 cm/sec and 43.1 cm/sec respectively.       Normal caliber and peak systolic velocities of the bilateral cavernosal arteries, which have improved compared to prior exam. Persistent elevation of the resistive indices on Doppler interrogation of the visualized portions of the cavernosal arteries, which is nonspecific.   I personally reviewed the images/study and I agree with the findings as stated by Dr. Lorne Quintanilla M.D. This study was interpreted at [place], [place].   MACRO: None   Signed by: Nik Fuchs 9/16/2024 9:58 AM Dictation workstation:   [id number]    XR chest 1 view    Result Date: 9/12/2024  Interpreted By:  Sravanthi Combs and Hofer Lindsay STUDY: XR CHEST 1 VIEW;  9/12/2024 8:14 pm   INDICATION: Signs/Symptoms:cp/sob.   COMPARISON: Chest radiograph 08/24/2024   ACCESSION NUMBER(S): [id number]   ORDERING CLINICIAN: MARK BLACKWELL   FINDINGS: AP radiograph of the chest was provided.   CARDIOMEDIASTINAL SILHOUETTE: Cardiomediastinal silhouette is normal in size and configuration.   LUNGS: No pulmonary consolidation. No pleural effusion or pneumothorax.   ABDOMEN: No remarkable upper abdominal findings.   BONES: No acute osseous changes.       1.  No acute cardiopulmonary process.   I personally reviewed the images/study and resident's interpretation and I agree with the findings as stated by Rose English MD (resident radiologist). This study was analyzed and interpreted at [place], [place].   MACRO: None   Signed by: Sravanthi Combs 9/12/2024 10:22 PM Dictation workstation:   [id number]    Vascular US penile artery duplex complete    Result Date: 9/1/2024  Interpreted By:  Curtis Johnson and Afshari  Lionel Valdez STUDY: VAS US PENILE ARTERY DUPLEX COMPLETE; ;  9/1/2024 7:08 pm   INDICATION: Signs/Symptoms:persistent priapism.   COMPARISON: None.   ACCESSION NUMBER(S): [id number]   ORDERING CLINICIAN: WOLF VIERA   TECHNIQUE: Multiplanar grayscale ultrasound images of the penis were obtained with color and spectral Doppler evaluation.   FINDINGS: Penile anatomy: The visualized portions of the corpus cavernosa is within normal limits. The tunica albuginea is intact. No focal soft tissue lesion or fluid collection.   Doppler evaluation: There is a small caliber of bilateral cavernosal arteries. Flow is noted within the deep and superficial dorsal veins. Flow is noted within the dorsal arteries bilaterally. The resistive indices in the right and left cavernosal arteries are 0.76 and 0.9 respectively. The velocities within the right and left cavernosal arteries are 8.3 cm/sec and 9.1 cm/sec respectively.       *Small caliber of bilateral cavernosal arteries with lack of color flow in portions of both arteries, more on the right. Additionally there is markedly low peak systolic velocities and increased resistive indices on Doppler interrogation of the visualized portions. Findings in the setting of sickle cell disease are compatible with low-flow/ischemic priapism. Urologic consultation is recommended.   I personally reviewed the images/study and I agree with the findings as stated by resident physician Dr. José Miguel Flowers . This study was interpreted at [facility], [place], [place].   MACRO: José Miguel Flowers discussed the significance and urgency of this critical finding by telephone with  WOLF VIERA on 9/1/2024 at 7:16 pm.  (**-RCF-**) Findings:  See findings.   Signed by: Curtis Johnson 9/1/2024 7:27 PM Dictation workstation:   [id number]         Assessment/Plan   Assessment & Plan  Sickle cell crisis (Multi)    The [facility] Medicine Symptom  Management program offers multi-disciplinary supervised care of cancer patients using Integrative Modalities billed to insurance using NCCN and SIO/ASCO guideline-driven practices.  ESAS is obtained prior to and after each treatment by the practitioner       Pre- Post-   Wellbeing   9  NA - pt sleeping after intervention   Coping  8  NA - pt sleeping after intervention   Pain  8  NA - pt sleeping after intervention   Fatigue  9  NA - pt sleeping after intervention   Anxiety   1  NA - pt sleeping after intervention   Depression  1  NA - pt sleeping after intervention   Stress  2  NA - pt sleeping after intervention   Nausea  3  NA - pt sleeping after intervention       Sickle cell disease with acute on chronic pain:   pain related to vaso-occlusive crisis  Defer to primary team for adequate PO/IV pain regimen   Recommend integrative therapy modalities as pt allows:  -Acupuncture; provided pt education today. Pt declined today, requesting acupuncture tomorrow.   -Acupressure; pt completed this with acupuncturist today (separate treatment from reiki)  -Gentle bodywork and stretching as tolerated; pt completed this with acupuncturist today (separate treatment from reiki)  -Reiki/meditation - completed 20 mins of reiki with pt today; pt sleeping at end of treatment          -Art therapy - pt declined   -Music therapy - consult placed   -Chaplaincy - pt declined   -Pet therapy - consult placed            Thank you for allowing us to participate in the care of this patient. Integrative Medicine Team will continue to follow as needed.  Please contact team with any questions or concerns.           RAAD Lopez-CNP (available by [email or web address])  [facility]  Inpatient Integrative Medicine      I spent 80 minutes in the care of this patient which included chart review, interviewing patient/family, discussion with primary team, coordination of care, and documentation.     Medical Decision  Making was high level due to high complexity of problems, extensive data review, and high risk of management/treatment.

## 2024-09-25 ENCOUNTER — APPOINTMENT (OUTPATIENT)
Dept: RADIOLOGY | Facility: HOSPITAL | Age: 24
DRG: 812 | End: 2024-09-25
Payer: COMMERCIAL

## 2024-09-25 ENCOUNTER — APPOINTMENT (OUTPATIENT)
Dept: HEMATOLOGY/ONCOLOGY | Facility: HOSPITAL | Age: 24
End: 2024-09-25
Payer: COMMERCIAL

## 2024-09-25 LAB
ALBUMIN SERPL BCP-MCNC: 4.2 G/DL (ref 3.4–5)
ALP SERPL-CCNC: 129 U/L (ref 33–120)
ALT SERPL W P-5'-P-CCNC: 34 U/L (ref 10–52)
ANION GAP SERPL CALC-SCNC: 11 MMOL/L (ref 10–20)
AST SERPL W P-5'-P-CCNC: 36 U/L (ref 9–39)
ATRIAL RATE: 66 BPM
ATRIAL RATE: 93 BPM
BASOPHILS # BLD AUTO: 0.04 X10*3/UL (ref 0–0.1)
BASOPHILS NFR BLD AUTO: 0.2 %
BILIRUB SERPL-MCNC: 3.7 MG/DL (ref 0–1.2)
BUN SERPL-MCNC: 8 MG/DL (ref 6–23)
CALCIUM SERPL-MCNC: 9.3 MG/DL (ref 8.6–10.6)
CHLORIDE SERPL-SCNC: 103 MMOL/L (ref 98–107)
CO2 SERPL-SCNC: 29 MMOL/L (ref 21–32)
CREAT SERPL-MCNC: 0.62 MG/DL (ref 0.5–1.3)
EGFRCR SERPLBLD CKD-EPI 2021: >90 ML/MIN/1.73M*2
EOSINOPHIL # BLD AUTO: 0.7 X10*3/UL (ref 0–0.7)
EOSINOPHIL NFR BLD AUTO: 4.1 %
ERYTHROCYTE [DISTWIDTH] IN BLOOD BY AUTOMATED COUNT: 18.6 % (ref 11.5–14.5)
GLUCOSE SERPL-MCNC: 75 MG/DL (ref 74–99)
HCT VFR BLD AUTO: 29.7 % (ref 41–52)
HGB BLD-MCNC: 9.8 G/DL (ref 13.5–17.5)
HGB RETIC QN: 32 PG (ref 28–38)
IMM GRANULOCYTES # BLD AUTO: 0.11 X10*3/UL (ref 0–0.7)
IMM GRANULOCYTES NFR BLD AUTO: 0.6 % (ref 0–0.9)
IMMATURE RETIC FRACTION: 19.8 %
LDH SERPL L TO P-CCNC: 259 U/L (ref 84–246)
LYMPHOCYTES # BLD AUTO: 2.72 X10*3/UL (ref 1.2–4.8)
LYMPHOCYTES NFR BLD AUTO: 15.8 %
MAGNESIUM SERPL-MCNC: 2.11 MG/DL (ref 1.6–2.4)
MCH RBC QN AUTO: 28.7 PG (ref 26–34)
MCHC RBC AUTO-ENTMCNC: 33 G/DL (ref 32–36)
MCV RBC AUTO: 87 FL (ref 80–100)
MONOCYTES # BLD AUTO: 2.07 X10*3/UL (ref 0.1–1)
MONOCYTES NFR BLD AUTO: 12 %
NEUTROPHILS # BLD AUTO: 11.57 X10*3/UL (ref 1.2–7.7)
NEUTROPHILS NFR BLD AUTO: 67.3 %
NRBC BLD-RTO: 1.4 /100 WBCS (ref 0–0)
P AXIS: 38 DEGREES
P AXIS: 48 DEGREES
P OFFSET: 179 MS
P OFFSET: 181 MS
P ONSET: 119 MS
P ONSET: 141 MS
PLATELET # BLD AUTO: 427 X10*3/UL (ref 150–450)
POTASSIUM SERPL-SCNC: 3.9 MMOL/L (ref 3.5–5.3)
PR INTERVAL: 146 MS
PR INTERVAL: 192 MS
PROT SERPL-MCNC: 6.4 G/DL (ref 6.4–8.2)
Q ONSET: 214 MS
Q ONSET: 215 MS
QRS COUNT: 11 BEATS
QRS COUNT: 16 BEATS
QRS DURATION: 106 MS
QRS DURATION: 108 MS
QT INTERVAL: 358 MS
QT INTERVAL: 394 MS
QTC CALCULATION(BAZETT): 413 MS
QTC CALCULATION(BAZETT): 445 MS
QTC FREDERICIA: 407 MS
QTC FREDERICIA: 414 MS
R AXIS: 64 DEGREES
R AXIS: 67 DEGREES
RBC # BLD AUTO: 3.42 X10*6/UL (ref 4.5–5.9)
RETICS #: 0.41 X10*6/UL (ref 0.02–0.12)
RETICS/RBC NFR AUTO: 11.9 % (ref 0.5–2)
SODIUM SERPL-SCNC: 139 MMOL/L (ref 136–145)
T AXIS: 54 DEGREES
T AXIS: 7 DEGREES
T OFFSET: 393 MS
T OFFSET: 412 MS
VENTRICULAR RATE: 66 BPM
VENTRICULAR RATE: 93 BPM
WBC # BLD AUTO: 17.2 X10*3/UL (ref 4.4–11.3)

## 2024-09-25 PROCEDURE — 85025 COMPLETE CBC W/AUTO DIFF WBC: CPT

## 2024-09-25 PROCEDURE — 2500000004 HC RX 250 GENERAL PHARMACY W/ HCPCS (ALT 636 FOR OP/ED)

## 2024-09-25 PROCEDURE — 71045 X-RAY EXAM CHEST 1 VIEW: CPT

## 2024-09-25 PROCEDURE — 80053 COMPREHEN METABOLIC PANEL: CPT

## 2024-09-25 PROCEDURE — 2500000005 HC RX 250 GENERAL PHARMACY W/O HCPCS: Performed by: PHYSICIAN ASSISTANT

## 2024-09-25 PROCEDURE — 85045 AUTOMATED RETICULOCYTE COUNT: CPT

## 2024-09-25 PROCEDURE — 36415 COLL VENOUS BLD VENIPUNCTURE: CPT

## 2024-09-25 PROCEDURE — 2500000001 HC RX 250 WO HCPCS SELF ADMINISTERED DRUGS (ALT 637 FOR MEDICARE OP): Performed by: NURSE PRACTITIONER

## 2024-09-25 PROCEDURE — 87040 BLOOD CULTURE FOR BACTERIA: CPT

## 2024-09-25 PROCEDURE — 99233 SBSQ HOSP IP/OBS HIGH 50: CPT

## 2024-09-25 PROCEDURE — 87086 URINE CULTURE/COLONY COUNT: CPT

## 2024-09-25 PROCEDURE — 83615 LACTATE (LD) (LDH) ENZYME: CPT

## 2024-09-25 PROCEDURE — 83735 ASSAY OF MAGNESIUM: CPT | Performed by: NURSE PRACTITIONER

## 2024-09-25 PROCEDURE — 2500000001 HC RX 250 WO HCPCS SELF ADMINISTERED DRUGS (ALT 637 FOR MEDICARE OP)

## 2024-09-25 PROCEDURE — 81003 URINALYSIS AUTO W/O SCOPE: CPT

## 2024-09-25 PROCEDURE — 1170000001 HC PRIVATE ONCOLOGY ROOM DAILY

## 2024-09-25 RX ORDER — ACETAMINOPHEN 325 MG/1
975 TABLET ORAL ONCE
Status: COMPLETED | OUTPATIENT
Start: 2024-09-25 | End: 2024-09-25

## 2024-09-25 RX ORDER — MORPHINE SULFATE 4 MG/ML
4 INJECTION INTRAVENOUS ONCE
Status: COMPLETED | OUTPATIENT
Start: 2024-09-25 | End: 2024-09-25

## 2024-09-25 ASSESSMENT — PAIN - FUNCTIONAL ASSESSMENT
PAIN_FUNCTIONAL_ASSESSMENT: 0-10

## 2024-09-25 ASSESSMENT — COGNITIVE AND FUNCTIONAL STATUS - GENERAL
DAILY ACTIVITIY SCORE: 24
MOBILITY SCORE: 24

## 2024-09-25 ASSESSMENT — PAIN SCALES - GENERAL
PAINLEVEL_OUTOF10: 9
PAINLEVEL_OUTOF10: 8
PAINLEVEL_OUTOF10: 9
PAINLEVEL_OUTOF10: 8
PAINLEVEL_OUTOF10: 9
PAINLEVEL_OUTOF10: 9
PAINLEVEL_OUTOF10: 8
PAINLEVEL_OUTOF10: 8
PAINLEVEL_OUTOF10: 9
PAINLEVEL_OUTOF10: 9
PAINLEVEL_OUTOF10: 8

## 2024-09-25 NOTE — CARE PLAN
The patient's goals for the shift include      The clinical goals for the shift include patient will report pain less than  8/10 this shift 9/25/24 1900      Problem: Pain  Goal: Takes deep breaths with improved pain control throughout the shift  Outcome: Progressing  Goal: Turns in bed with improved pain control throughout the shift  Outcome: Progressing  Goal: Walks with improved pain control throughout the shift  Outcome: Progressing  Goal: Performs ADL's with improved pain control throughout shift  Outcome: Progressing  Goal: Participates in PT with improved pain control throughout the shift  Outcome: Progressing  Goal: Free from opioid side effects throughout the shift  Outcome: Progressing  Goal: Free from acute confusion related to pain meds throughout the shift  Outcome: Progressing     Problem: Safety - Adult  Goal: Free from fall injury  Outcome: Progressing

## 2024-09-25 NOTE — CARE PLAN
Problem: Pain  Goal: Takes deep breaths with improved pain control throughout the shift  Outcome: Progressing  Goal: Turns in bed with improved pain control throughout the shift  Outcome: Progressing  Goal: Walks with improved pain control throughout the shift  Outcome: Progressing  Goal: Performs ADL's with improved pain control throughout shift  Outcome: Progressing  Goal: Participates in PT with improved pain control throughout the shift  Outcome: Progressing  Goal: Free from opioid side effects throughout the shift  Outcome: Progressing  Goal: Free from acute confusion related to pain meds throughout the shift  Outcome: Progressing     Problem: Pain - Adult  Goal: Verbalizes/displays adequate comfort level or baseline comfort level  Outcome: Progressing     Problem: Safety - Adult  Goal: Free from fall injury  Outcome: Progressing     Problem: Discharge Planning  Goal: Discharge to home or other facility with appropriate resources  Outcome: Progressing     Problem: Chronic Conditions and Co-morbidities  Goal: Patient's chronic conditions and co-morbidity symptoms are monitored and maintained or improved  Outcome: Progressing     Problem: Fall/Injury  Goal: Not fall by end of shift  Outcome: Progressing  Goal: Be free from injury by end of the shift  Outcome: Progressing  Goal: Verbalize understanding of personal risk factors for fall in the hospital  Outcome: Progressing  Goal: Verbalize understanding of risk factor reduction measures to prevent injury from fall in the home  Outcome: Progressing  Goal: Use assistive devices by end of the shift  Outcome: Progressing  Goal: Pace activities to prevent fatigue by end of the shift  Outcome: Progressing       The clinical goals for the shift include pt will report pain less than 8/10 throughout shift    Over the shift, the patient remained safe and free from injury. Had pain in chest, medicated PRN throughout shift. Remained VSS throughout shift. No acute events  overnight

## 2024-09-25 NOTE — PROGRESS NOTES
"Joellen Narayan is a 23 y.o. male on day 12 of admission presenting with Sickle cell crisis (Multi).    Subjective   Joellen is doing okay this morning. States his pain is still present but states the morphine is helping better than the IV dilaudid. Also admits to a HA, we discussed ordering tylenol for him. Admits to the same chest pain, denies SOB, N/V/D/C, fever, chills, priapism. ROS: A complete review of systems was performed and is negative except for as mentioned above in the HPI        Objective     Physical Exam  Constitutional:       General: He is not in acute distress.     Appearance: He is not toxic-appearing.   HENT:      Head: Normocephalic and atraumatic.   Eyes:      General: Scleral icterus present.      Extraocular Movements: Extraocular movements intact.   Cardiovascular:      Rate and Rhythm: Normal rate and regular rhythm.   Pulmonary:      Effort: No respiratory distress.   Abdominal:      Tenderness: There is abdominal tenderness.   Musculoskeletal:         General: No swelling or tenderness.   Skin:     Coloration: Skin is not jaundiced.      Findings: No bruising or erythema.   Neurological:      Mental Status: He is oriented to person, place, and time. Mental status is at baseline.         Last Recorded Vitals  Blood pressure 123/61, pulse 68, temperature 36.1 °C (97 °F), temperature source Temporal, resp. rate 16, height 1.88 m (6' 2\"), weight 69.4 kg (153 lb), SpO2 97%.  Intake/Output last 3 Shifts:  I/O last 3 completed shifts:  In: - (0 mL/kg)   Out: 1400 (20.2 mL/kg) [Urine:1400 (0.6 mL/kg/hr)]  Weight: 69.4 kg         Assessment/Plan   Assessment & Plan  Sickle cell crisis (Multi)    Joellen Narayan is a 23 y.o. 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 ED 9/12 with priapism (with associated penile pain) since 9/12/24 at 5pm and with sickle cell pain crisis. CXR (9/12) negative for acute cardiopulmonary process. Urology following patient for priapism, upon presentation, patient did not agree to bedside drainage and corporal blood gas. 9/13 started on Ketoconazole 200mg BID and prednisone 5mg daily per urology recs. Penile doppler US ordered 9/15 per urology, showing normal caliber and peak systolic velocities of b/l cavernosal arteries which have improved since prior. Hematology consulted on 9/13 give persistent priapism x1 month, recommend emergent RBCex. 9/13 plan for apheresis line placement in the ED followed by emergent RBCex, however, patient declined placement of line in ED on 9/13, plan for apheresis line placement with IR followed by RBCex on 9/16. On 9/16 he then again declined apheresis line placement and RBCEx. Pain was stable x2 days until again 9/18 pt endorsed significant groin pain, penis still bendable. Discussed with urology, pt to be re-evaluated. Tbili up to 8.0 (9/18), 11.1 (9/19); RUQ US with cholelithiasis without cholecystitis (9/18). Pt became amenable to RBCEx, s/p apheresis line 9/18 and s/p RBC exchange pt 9/19. On 9/20, lysis labs improving post exchange. Apheresis line removed, EKG, CXR and troponin were unremarkable. Urology re-engaged on 9/20 for continued groin/shaft pain localized around site from recent urology intervention with minimal improvement clinically since RBCex; no interventions needed. 9/22 developed epigastric pain, given recent choledocholithiasis, amylase and lipase sent (negative), troponin and CXR also negative 9/21. S/p Rotating PO oxycodone and IVP dilaudid; (9/23-current) started IV morphine PRN for pain management. Increased leukocytosis noted 9/25, infectious workup started however pt  afebrile with no s/sx of infectious process. Discharge pending improvement in pain and stability of priapism.       # Leukocytosis  -  9/25 WBC 17.2, increased significantly from 9/24 WBC 9.7  - likely reactive to pain vs infectious process as pt afebrile with no s/sx of infectious process  - CXR 9/21 negative for acute process, troponin negative, EKG NSR  - CXR (9/25) pending  - UA (9/25) pending  - Blood cultures x 2 (9/25) pending   - low threshold to start abx if pt becomes febrile, has new infectious symptoms    # Priapism  - Presented to the ED with worsening priapism since 5pm (hadn't resolved from last admission)  - s/p recent admission 8/31-9/9, OR with urology for priapism drainage, penile block, and corpora cavernosa irrigation  - Also, s/p scheduled Sudafed q8h and added ibuprofen, baclofen 5mg TID and gabapentin 100mg TID  - Penile doppler US while prior inpatient (9/1) showing small caliber of bilateral cavernosal arteries with lack of color flow in portions of both arteries, more on the right, c/w low-flow/ischemic priapism. Urology notified, s/p phenylephrine injection with urology 9/1 evening with partial resolution of priapism  - Reassessed by Urology 9/7/24 reevaluate penile appearance; firm but still bendable with no increased pain. Rec to continue management as prior and to maintain outpatient appointment on 9/10/24.  - Outpatient appointment 9/10 prescribed Flutamide 125mg TID but per parent, no pharmacy has this medication so it was not started  - Doppler U/S completed outpatient 9/10 noted arterial flow  - Urology following rec bedside drainage and corporal blood gas and phenylepinephine injection --patient refusing; also rec starting treatment with ketoconazole with prednisone, 9/15 rec penile doppler US    - Penile doppler US (9/15) per urology, showing normal caliber and peak systolic velocities of b/l cavernosal arteries which have improved since prior.   - Heme following and rec emergent  RBCex in setting of prolonged priapism  - Continue home Sudafed q8h prn and added ibuprofen, Baclofen 5mg TID and Gabapentin 100mg TID  - 9/13 started on ketoconazole 200mg BID and Prednisone 5mg daily-per urology recs pt to be dc'd with this  - C. Trachomatis/N. Gonorrhoeae (9/14): negative   - Initially planned for aphesis line placement followed by RBCex on 9/13, however, patient declined placement of apheresis line in the ED, primary, hematology and urology team explained risks to patient and he continued to decline   - Planed for apheresis line placement in IR on 9/16 followed by RBCex - hematology, Dr. Cruz, and trransfusion med team aware--> however on 9/16 pt declined apheresis line placement and RBCEx again  - 9/16 pt was educated on risks of refusing/delaying medical care and recurrent priapism  - Pain was stable x2 days until again 9/18 pt endorsed significant groin pain, penis still bendable. Discussed with urology, pt to be re-evaluated.   - RUQ US with cholelithiasis without cholecystitis (9/18)   - Pt amenable to RBCEx, IR placed apheresis line 9/18 and s/p RBCex 9/19   - Tbili increased to 8.0 (9/18) --> 11.1 (9/19) --> 6.3 (9/20) s/p RBCex . 9/25 tbili 3.7  - On 9/19, patient reporting slight improvement of groin pain, mild RLQ tenderness  - Urology re-engaged on 9/20 for continued groin/shaft pain localized around site from recent urology intervention with minimal improvement clinically since RBCex; no interventions at this time      # Hgb SS with severe pain   - OARRS reviewed, no aberrancies  - No current care path  - Hgb baseline ~8.5; currently stable, no indication for simple transfusion   - Tbili baseline fluctuates ~5-1; Tbili 4.4 on 9/16, up to 6.0 on 9/17. 9/25 tbili 3.7  - LDH baseline ~500   on 9/16. 9/25   - s/p IV Dilaudid 2mg q2hrs PRN severe pain (9/12-9/16)  - 9/16 weaned pain meds to IV dilaudid 1mg q3hrs PRN severe pain  - Overnight patient lost IV access, refused  subcutaneous dilaudid but since pain is improving agreeable to start 20mg PO oxycodone Q4 hours for severe pain (9/19)   - given uncontrolled pain, s/p patient switched to rotating PO oxycodone 15mg and IVP dilaudid 2mg q2h (9/19-9/23)  - (9/23-current) pt's pain was uncontrollable overnight, pt previously on IV morphine. Pt was started on IV morphine 10mg q2h PRN for severe pain  - Continue folic acid 1mg daily  - Hgb S (9/12): 59.8%   - Repeat Hgb S (9/18): 59.9%  - Hgb S post exchange 19.5%   - Apheresis line removed, CXR and troponin unremarkable (ordered d/t chest pain after line was removed. EKG NSR.   - PO Zofran PRN for opioid-induced nausea, PO Benadryl PRN for opioid-induced pruritus, Bowel regimen for opioid-induced constipation with DocuSenna 2tabs BID and Miralax daily  - Psychiatry was consulted (9/23) for anxiety, rec no medications at this time and signed off  - Integrative medicine following    # Recent Choledocholithiasis  # Epigastric pain   - s/p ERCP 7/18/24 with a biliary sphincterotomy where sludge and stones were removed, achieving complete clearance  - Seen by gen surg 8/8/24 Dr. Dove who rec lap cholecystectomy d/t the chance of recurrence of choledocholithiasis  - Surgery has yet to be scheduled, has FUV with gen surg again 9/30  - Tbili 4.4 on 9/16, (recent peak at 52.8 prior to ERCP during recent hospital admission); increased to 8.9 (9/18) --> 11.1 (9/19) --> 6.3 (9/20) s/p RBCex   - RUQ US with cholelithiasis without cholecystitis (9/18)   - pt reports epigastric pain 9/22, PPI changed to IV   - amylase and lipase negative (9/22)  - CXR 9/21 negative for acute process, troponin negative   - low threshold for CT AP if worsening pain, though improving as of (9/23-current)      # Nodular lymphocyte predominant Hodgkins lymphoma (NLPHL)   - Primary Oncologist: Dr. Stoll  - Enlarged lymph nodes noted 4/1/22  - RUQ US (11/14/22) with mildly enlarged LNs in the region of the kavin  "hepatis  - MRI liver (11/16/22) showed re-demonstration of bulky retroperitoneal lymphadenopathy and kavin hepatic lymphadenopathy    - (11/18/22) lymph node biopsy showed atypical lymphoid infiltrate. Reviewed by Hemepath board, insufficient for lymphoma diagnosis  - PET/CT (12/6/22) showing retroperitoneal lymphadenopathy  - Followed up with Dr. Stoll (12/16/22) with plan for surg/onc consult for large tissue bx but patient missed apt and was lost to follow up  - Requested new apt with Dr. Ronnie Marte 6/19/23, patient was not seen and lost to follow up  - CT a/p (11/28/23) increased size of retroperitoneal lymph nodes reflecting extramedullary hematopoiesis I/s/o sickle cell vs lymphoma  - Paraaortic LN bx via IR (11/30/23) consistent with NLPHL. Flow: no clonal B cell or T cell population identified, lymphocyte 95%, CD3+CD4+ 68%, CD3+CD8+ 7%, CD19+ 24%  - Elevated LDH/bili partially from sickle cell disease   - Chemotherapy (R-CHOP) was discussed with primary oncologist Dr. Stoll, and decision was made to simplify his chemotherapy to Rituxan and prednisone q3 weeks mainly due to frequent sickle cell crisis  - Current chemo regimen: Rituxan and prednisone q3 weeks (C1 1/18/24, C2 2/8/24, Missed C3 d/t sickle cell pain crisis, C4 4/4/24, C5 5/16/24, C6 6/7/24)  - Per pt no longer taking Acyclovir 400mg BID prophy   - PET CT (7/25) overall showing great response to treatment with persistent residual viable disease involving a left common iliac node  - Last FUV with Dr. Stoll 7/25/24 rec RTC in 2 months (next FUV scheduled for 10/22)     # Hx of nocturnal oxygen dependence and hypoxia on room air  - Pt currently has home 2L supp O2   - Wean as able, encourage incentive spirometry  - Pulm FUV 8/8- \"Given his history of sickle cell disease, it is important to ensure he has formal sleep testing to look at desaturations and presence of sleep apnea despite not having a convincing history/body habitus typical for " "suspecting sleep apnea- patients with sickle cell have a higher prevalence of sleep disorders including nocturnal hypoxemia\"--> rec sleep referral for formal testing if deemed appropriate, ABG and O2 destat testing, and TTE with bubble study  - TTE 8/23 showing EF 60-65%, severely dilated LV and LA, + intrapulmonary shunting       DVT prophy: Lovenox subcutaneous, SCDs, encourage ambulation     DISPO:  - Full code, confirmed on admit  - Discharge pending improvement in pain and stability of priapism and RBCex   - SUSIE Narayan (Parent) [phone]  - Sickle cell FUV 10/9, Dr. Stoll FUV 10/22, Gen Surg FUV 9/30, Urology FUV 10/8, ENT 11/19    I spent >60 minutes in the professional and overall care of this patient.    Assessment and plan discussed with attending physician Dr. Soheila Couch, PAKenzieC      "

## 2024-09-26 PROBLEM — D57.00 SICKLE CELL CRISIS (MULTI): Status: RESOLVED | Noted: 2024-06-18 | Resolved: 2024-09-26

## 2024-09-26 LAB
ALBUMIN SERPL BCP-MCNC: 4.2 G/DL (ref 3.4–5)
ALP SERPL-CCNC: 122 U/L (ref 33–120)
ALT SERPL W P-5'-P-CCNC: 38 U/L (ref 10–52)
ANION GAP SERPL CALC-SCNC: 11 MMOL/L (ref 10–20)
APPEARANCE UR: CLEAR
AST SERPL W P-5'-P-CCNC: 42 U/L (ref 9–39)
BACTERIA #/AREA URNS AUTO: ABNORMAL /HPF
BASOPHILS # BLD AUTO: 0.04 X10*3/UL (ref 0–0.1)
BASOPHILS NFR BLD AUTO: 0.3 %
BILIRUB SERPL-MCNC: 4.1 MG/DL (ref 0–1.2)
BILIRUB UR STRIP.AUTO-MCNC: NEGATIVE MG/DL
BUN SERPL-MCNC: 8 MG/DL (ref 6–23)
CALCIUM SERPL-MCNC: 9.5 MG/DL (ref 8.6–10.6)
CHLORIDE SERPL-SCNC: 102 MMOL/L (ref 98–107)
CO2 SERPL-SCNC: 29 MMOL/L (ref 21–32)
COLOR UR: YELLOW
CREAT SERPL-MCNC: 0.55 MG/DL (ref 0.5–1.3)
EGFRCR SERPLBLD CKD-EPI 2021: >90 ML/MIN/1.73M*2
EOSINOPHIL # BLD AUTO: 0.76 X10*3/UL (ref 0–0.7)
EOSINOPHIL NFR BLD AUTO: 6.4 %
ERYTHROCYTE [DISTWIDTH] IN BLOOD BY AUTOMATED COUNT: 18.5 % (ref 11.5–14.5)
GLUCOSE SERPL-MCNC: 76 MG/DL (ref 74–99)
GLUCOSE UR STRIP.AUTO-MCNC: NORMAL MG/DL
HCT VFR BLD AUTO: 28.4 % (ref 41–52)
HGB BLD-MCNC: 9.4 G/DL (ref 13.5–17.5)
HGB RETIC QN: 32 PG (ref 28–38)
HOLD SPECIMEN: NORMAL
IMM GRANULOCYTES # BLD AUTO: 0.12 X10*3/UL (ref 0–0.7)
IMM GRANULOCYTES NFR BLD AUTO: 1 % (ref 0–0.9)
IMMATURE RETIC FRACTION: 18.8 %
KETONES UR STRIP.AUTO-MCNC: NEGATIVE MG/DL
LDH SERPL L TO P-CCNC: 282 U/L (ref 84–246)
LEUKOCYTE ESTERASE UR QL STRIP.AUTO: ABNORMAL
LYMPHOCYTES # BLD AUTO: 3.14 X10*3/UL (ref 1.2–4.8)
LYMPHOCYTES NFR BLD AUTO: 26.5 %
MAGNESIUM SERPL-MCNC: 2.14 MG/DL (ref 1.6–2.4)
MCH RBC QN AUTO: 28.7 PG (ref 26–34)
MCHC RBC AUTO-ENTMCNC: 33.1 G/DL (ref 32–36)
MCV RBC AUTO: 87 FL (ref 80–100)
MONOCYTES # BLD AUTO: 1.5 X10*3/UL (ref 0.1–1)
MONOCYTES NFR BLD AUTO: 12.6 %
MUCOUS THREADS #/AREA URNS AUTO: ABNORMAL /LPF
NEUTROPHILS # BLD AUTO: 6.31 X10*3/UL (ref 1.2–7.7)
NEUTROPHILS NFR BLD AUTO: 53.2 %
NITRITE UR QL STRIP.AUTO: NEGATIVE
NRBC BLD-RTO: 1.4 /100 WBCS (ref 0–0)
PH UR STRIP.AUTO: 6.5 [PH]
PLATELET # BLD AUTO: 454 X10*3/UL (ref 150–450)
POTASSIUM SERPL-SCNC: 4 MMOL/L (ref 3.5–5.3)
PROT SERPL-MCNC: 6.4 G/DL (ref 6.4–8.2)
PROT UR STRIP.AUTO-MCNC: NEGATIVE MG/DL
RBC # BLD AUTO: 3.27 X10*6/UL (ref 4.5–5.9)
RBC # UR STRIP.AUTO: NEGATIVE /UL
RBC #/AREA URNS AUTO: ABNORMAL /HPF
RETICS #: 0.34 X10*6/UL (ref 0.02–0.12)
RETICS/RBC NFR AUTO: 10.3 % (ref 0.5–2)
SODIUM SERPL-SCNC: 138 MMOL/L (ref 136–145)
SP GR UR STRIP.AUTO: 1.01
UROBILINOGEN UR STRIP.AUTO-MCNC: ABNORMAL MG/DL
WBC # BLD AUTO: 11.9 X10*3/UL (ref 4.4–11.3)
WBC #/AREA URNS AUTO: ABNORMAL /HPF

## 2024-09-26 PROCEDURE — 2500000001 HC RX 250 WO HCPCS SELF ADMINISTERED DRUGS (ALT 637 FOR MEDICARE OP)

## 2024-09-26 PROCEDURE — 2500000005 HC RX 250 GENERAL PHARMACY W/O HCPCS: Performed by: PHYSICIAN ASSISTANT

## 2024-09-26 PROCEDURE — 85025 COMPLETE CBC W/AUTO DIFF WBC: CPT

## 2024-09-26 PROCEDURE — 2500000004 HC RX 250 GENERAL PHARMACY W/ HCPCS (ALT 636 FOR OP/ED)

## 2024-09-26 PROCEDURE — 83615 LACTATE (LD) (LDH) ENZYME: CPT

## 2024-09-26 PROCEDURE — 36415 COLL VENOUS BLD VENIPUNCTURE: CPT

## 2024-09-26 PROCEDURE — 85045 AUTOMATED RETICULOCYTE COUNT: CPT

## 2024-09-26 PROCEDURE — 2500000004 HC RX 250 GENERAL PHARMACY W/ HCPCS (ALT 636 FOR OP/ED): Performed by: NURSE PRACTITIONER

## 2024-09-26 PROCEDURE — 84075 ASSAY ALKALINE PHOSPHATASE: CPT

## 2024-09-26 PROCEDURE — 99233 SBSQ HOSP IP/OBS HIGH 50: CPT

## 2024-09-26 PROCEDURE — 2500000005 HC RX 250 GENERAL PHARMACY W/O HCPCS

## 2024-09-26 PROCEDURE — 83735 ASSAY OF MAGNESIUM: CPT | Performed by: NURSE PRACTITIONER

## 2024-09-26 PROCEDURE — 99233 SBSQ HOSP IP/OBS HIGH 50: CPT | Performed by: NURSE PRACTITIONER

## 2024-09-26 PROCEDURE — 1170000001 HC PRIVATE ONCOLOGY ROOM DAILY

## 2024-09-26 RX ORDER — MORPHINE SULFATE 4 MG/ML
6 INJECTION INTRAVENOUS EVERY 2 HOUR PRN
Status: DISCONTINUED | OUTPATIENT
Start: 2024-09-26 | End: 2024-09-27

## 2024-09-26 ASSESSMENT — PAIN - FUNCTIONAL ASSESSMENT
PAIN_FUNCTIONAL_ASSESSMENT: 0-10

## 2024-09-26 ASSESSMENT — PAIN SCALES - GENERAL
PAINLEVEL_OUTOF10: 8
PAINLEVEL_OUTOF10: 8
PAINLEVEL_OUTOF10: 10 - WORST POSSIBLE PAIN
PAINLEVEL_OUTOF10: 10 - WORST POSSIBLE PAIN
PAINLEVEL_OUTOF10: 8
PAINLEVEL_OUTOF10: 8
PAINLEVEL_OUTOF10: 10 - WORST POSSIBLE PAIN
PAINLEVEL_OUTOF10: 7
PAINLEVEL_OUTOF10: 7
PAINLEVEL_OUTOF10: 8

## 2024-09-26 ASSESSMENT — PAIN DESCRIPTION - LOCATION: LOCATION: LEG

## 2024-09-26 ASSESSMENT — PAIN DESCRIPTION - ORIENTATION: ORIENTATION: LEFT

## 2024-09-26 NOTE — CARE PLAN
Problem: Pain  Goal: Takes deep breaths with improved pain control throughout the shift  Outcome: Progressing  Goal: Turns in bed with improved pain control throughout the shift  Outcome: Progressing  Goal: Walks with improved pain control throughout the shift  Outcome: Progressing  Goal: Performs ADL's with improved pain control throughout shift  Outcome: Progressing  Goal: Participates in PT with improved pain control throughout the shift  Outcome: Progressing  Goal: Free from opioid side effects throughout the shift  Outcome: Progressing  Goal: Free from acute confusion related to pain meds throughout the shift  Outcome: Progressing     Problem: Pain - Adult  Goal: Verbalizes/displays adequate comfort level or baseline comfort level  Outcome: Progressing     Problem: Safety - Adult  Goal: Free from fall injury  Outcome: Progressing     Problem: Discharge Planning  Goal: Discharge to home or other facility with appropriate resources  Outcome: Progressing     Problem: Chronic Conditions and Co-morbidities  Goal: Patient's chronic conditions and co-morbidity symptoms are monitored and maintained or improved  Outcome: Progressing     Problem: Fall/Injury  Goal: Not fall by end of shift  Outcome: Progressing  Goal: Be free from injury by end of the shift  Outcome: Progressing  Goal: Verbalize understanding of personal risk factors for fall in the hospital  Outcome: Progressing  Goal: Verbalize understanding of risk factor reduction measures to prevent injury from fall in the home  Outcome: Progressing  Goal: Use assistive devices by end of the shift  Outcome: Progressing  Goal: Pace activities to prevent fatigue by end of the shift  Outcome: Progressing       The clinical goals for the shift include pt will report pain below 8/10 throughout shift

## 2024-09-26 NOTE — PROGRESS NOTES
"Joellen Narayan is a 23 y.o. male on day 13 of admission presenting with Sickle cell crisis (Multi).    Subjective   Pt resting in bed, reporting increased pain in chest and right flank. Attempted acupuncture by acupuncturist today; pt felt it was too intense and session was stopped. Pt completed body work and acupuressure by acupuncturist today.    Objective     Physical Exam  Vitals reviewed.   Constitutional:       General: He is not in acute distress.     Appearance: Normal appearance. He is normal weight. He is ill-appearing.      Interventions: Nasal cannula in place.   HENT:      Head: Normocephalic and atraumatic.      Nose: Nose normal.      Mouth/Throat:      Mouth: Mucous membranes are moist.   Eyes:      Extraocular Movements: Extraocular movements intact.      Pupils: Pupils are equal, round, and reactive to light.   Cardiovascular:      Rate and Rhythm: Normal rate and regular rhythm.   Pulmonary:      Effort: Pulmonary effort is normal.   Abdominal:      General: Abdomen is flat.      Palpations: Abdomen is soft.   Skin:     General: Skin is warm and dry.   Neurological:      General: No focal deficit present.      Mental Status: He is alert and oriented to person, place, and time. Mental status is at baseline.   Psychiatric:         Mood and Affect: Mood normal.         Behavior: Behavior normal.         Thought Content: Thought content normal.         Judgment: Judgment normal.         Last Recorded Vitals  Blood pressure 117/64, pulse 69, temperature 36.6 °C (97.8 °F), temperature source Temporal, resp. rate 16, height 1.88 m (6' 2\"), weight 69.4 kg (153 lb), SpO2 97%.  Intake/Output last 3 Shifts:  No intake/output data recorded.    Relevant Results  Scheduled medications  baclofen, 5 mg, oral, TID  flutamide, 125 mg, oral, TID  folic acid, 1 mg, oral, Daily  gabapentin, 100 mg, oral, q8h REYNALDO  ketoconazole, 200 mg, oral, BID  oxygen, , inhalation, Continuous - Inhalation  pantoprazole, 40 mg, " intravenous, Daily  polyethylene glycol, 17 g, oral, Daily  predniSONE, 5 mg, oral, Daily  pseudoephedrine, 60 mg, oral, q8h  sennosides-docusate sodium, 2 tablet, oral, BID      Continuous medications     PRN medications  PRN medications: butalbital-acetaminophen-caff, diphenhydrAMINE, morphine, ondansetron ODT **OR** [DISCONTINUED] ondansetron, oxygen    Results for orders placed or performed during the hospital encounter of 09/12/24 (from the past 24 hour(s))   Blood Culture    Specimen: Peripheral Venipuncture; Blood culture   Result Value Ref Range    Blood Culture Loaded on Instrument - Culture in progress    Blood Culture    Specimen: Peripheral Venipuncture; Blood culture   Result Value Ref Range    Blood Culture Loaded on Instrument - Culture in progress    Urinalysis with Reflex Culture and Microscopic   Result Value Ref Range    Color, Urine Yellow Light-Yellow, Yellow, Dark-Yellow    Appearance, Urine Clear Clear    Specific Gravity, Urine 1.011 1.005 - 1.035    pH, Urine 6.5 5.0, 5.5, 6.0, 6.5, 7.0, 7.5, 8.0    Protein, Urine NEGATIVE NEGATIVE, 10 (TRACE), 20 (TRACE) mg/dL    Glucose, Urine Normal Normal mg/dL    Blood, Urine NEGATIVE NEGATIVE    Ketones, Urine NEGATIVE NEGATIVE mg/dL    Bilirubin, Urine NEGATIVE NEGATIVE    Urobilinogen, Urine 6 (2+) (A) Normal mg/dL    Nitrite, Urine NEGATIVE NEGATIVE    Leukocyte Esterase, Urine 250 Andrea/µL (A) NEGATIVE   Extra Urine Gray Tube   Result Value Ref Range    Extra Tube Hold for add-ons.    Microscopic Only, Urine   Result Value Ref Range    WBC, Urine 21-50 (A) 1-5, NONE /HPF    RBC, Urine 1-2 NONE, 1-2, 3-5 /HPF    Bacteria, Urine 1+ (A) NONE SEEN /HPF    Mucus, Urine FEW Reference range not established. /LPF   CBC and Auto Differential   Result Value Ref Range    WBC 11.9 (H) 4.4 - 11.3 x10*3/uL    nRBC 1.4 (H) 0.0 - 0.0 /100 WBCs    RBC 3.27 (L) 4.50 - 5.90 x10*6/uL    Hemoglobin 9.4 (L) 13.5 - 17.5 g/dL    Hematocrit 28.4 (L) 41.0 - 52.0 %    MCV 87  80 - 100 fL    MCH 28.7 26.0 - 34.0 pg    MCHC 33.1 32.0 - 36.0 g/dL    RDW 18.5 (H) 11.5 - 14.5 %    Platelets 454 (H) 150 - 450 x10*3/uL    Neutrophils % 53.2 40.0 - 80.0 %    Immature Granulocytes %, Automated 1.0 (H) 0.0 - 0.9 %    Lymphocytes % 26.5 13.0 - 44.0 %    Monocytes % 12.6 2.0 - 10.0 %    Eosinophils % 6.4 0.0 - 6.0 %    Basophils % 0.3 0.0 - 2.0 %    Neutrophils Absolute 6.31 1.20 - 7.70 x10*3/uL    Immature Granulocytes Absolute, Automated 0.12 0.00 - 0.70 x10*3/uL    Lymphocytes Absolute 3.14 1.20 - 4.80 x10*3/uL    Monocytes Absolute 1.50 (H) 0.10 - 1.00 x10*3/uL    Eosinophils Absolute 0.76 (H) 0.00 - 0.70 x10*3/uL    Basophils Absolute 0.04 0.00 - 0.10 x10*3/uL   Comprehensive Metabolic Panel   Result Value Ref Range    Glucose 76 74 - 99 mg/dL    Sodium 138 136 - 145 mmol/L    Potassium 4.0 3.5 - 5.3 mmol/L    Chloride 102 98 - 107 mmol/L    Bicarbonate 29 21 - 32 mmol/L    Anion Gap 11 10 - 20 mmol/L    Urea Nitrogen 8 6 - 23 mg/dL    Creatinine 0.55 0.50 - 1.30 mg/dL    eGFR >90 >60 mL/min/1.73m*2    Calcium 9.5 8.6 - 10.6 mg/dL    Albumin 4.2 3.4 - 5.0 g/dL    Alkaline Phosphatase 122 (H) 33 - 120 U/L    Total Protein 6.4 6.4 - 8.2 g/dL    AST 42 (H) 9 - 39 U/L    Bilirubin, Total 4.1 (H) 0.0 - 1.2 mg/dL    ALT 38 10 - 52 U/L   Lactate Dehydrogenase   Result Value Ref Range     (H) 84 - 246 U/L   Reticulocytes   Result Value Ref Range    Retic % 10.3 (H) 0.5 - 2.0 %    Retic Absolute 0.335 (H) 0.022 - 0.118 x10*6/uL    Reticulocyte Hemoglobin 32 28 - 38 pg    Immature Retic fraction 18.8 (H) <=16.0 %   Magnesium   Result Value Ref Range    Magnesium 2.14 1.60 - 2.40 mg/dL     ECG 12 Lead    Result Date: 9/24/2024  Normal sinus rhythm Voltage criteria for left ventricular hypertrophy Abnormal ECG When compared with ECG of 20-SEP-2024 10:13, (unconfirmed) T wave inversion no longer evident in Inferior leads    ECG 12 Lead    Result Date: 9/24/2024  Normal sinus rhythm Voltage criteria  for left ventricular hypertrophy Abnormal ECG When compared with ECG of 20-SEP-2024 10:13, (unconfirmed) No significant change was found    ECG 12 lead    Result Date: 9/22/2024  Normal sinus rhythm Rightward axis Nonspecific T wave abnormality Abnormal ECG When compared with ECG of 23-AUG-2024 20:17, No significant change was found Confirmed by Theo Hassan ([id number]) on 9/22/2024 2:47:13 PM    XR chest 1 view    Result Date: 9/21/2024  Interpreted By:  Rogelio Tsang and Ohs Zachary STUDY: XR CHEST 1 VIEW;  9/20/2024 6:34 pm   INDICATION: Signs/Symptoms:sickle cell, increased pain after removal of trialysis line today.   COMPARISON: Chest radiograph 09/20/2024, CT abdomen/pelvis 08/23/2024.   ACCESSION NUMBER(S): [id number]   ORDERING CLINICIAN: DAE LOPEZ   FINDINGS: AP radiograph of the chest was provided.   MEDICAL DEVICES: None.   CARDIOMEDIASTINAL SILHOUETTE: Cardiomediastinal silhouette is normal in size and configuration.   LUNGS: No pneumothorax, pleural effusion or focal consolidation.   ABDOMEN: No remarkable upper abdominal findings.   BONES: No acute osseous changes.       1.  No evidence of acute cardiopulmonary process.   I personally reviewed the images/study and I agree with the findings as stated by Dr. Lorne Martinez. This study was interpreted at [place], [place].   MACRO: None   Signed by: Rogelio Tsang 9/21/2024 1:15 PM Dictation workstation:   [id number]    XR chest 1 view    Result Date: 9/20/2024  Interpreted By:  Allyn Woods, STUDY: XR CHEST 1 VIEW;  9/20/2024 11:25 am   INDICATION: Signs/Symptoms:r/o pneumothorax.     COMPARISON: 09/12/2024   ACCESSION NUMBER(S): [id number]   ORDERING CLINICIAN: DONNA MORGAN   FINDINGS:         CARDIOMEDIASTINAL SILHOUETTE: Cardiomediastinal silhouette is normal in size and configuration.   LUNGS: Lungs are clear. No evidence of a pneumothorax. Costophrenic angles are clear   ABDOMEN: No  remarkable upper abdominal findings.   BONES: No acute osseous changes.       1.  No evidence of acute cardiopulmonary process.       MACRO: None   Signed by: Allyn Woods 9/20/2024 1:19 PM Dictation workstation:   [id number]    US right upper quadrant    Result Date: 9/19/2024  Interpreted By:  Kasprzak, Jamshid,  and Ambridge Heidi STUDY: US RIGHT UPPER QUADRANT;  9/18/2024 3:26 pm   INDICATION: Signs/Symptoms:c/f cholecystitis.     COMPARISON: CT abdomen pelvis 08/23/2024   ACCESSION NUMBER(S): [id number]   ORDERING CLINICIAN: ROSEANNA SOSA   TECHNIQUE: Multiple images of the right upper quadrant were obtained.  This examination was interpreted at [facility].   FINDINGS: LIVER: The liver measures 19.2 cm and is grossly unremarkable and free of any focal lesions.   GALLBLADDER: The gallbladder is nondistended, and demonstrates no evidence of wall thickening or surrounding fluid. The gallbladder wall thickness is 0.2 cm. Sonographic Roman's sign is negative.   Multiple echogenic gallstones within the gallbladder lumen posterior shadowing.   Several rounded nodules in the pericholecystic region measuring up to 0.9 cm favored to represent nonenlarged lymph nodes.   BILE DUCTS: No evidence of intra or extrahepatic biliary dilatation is identified; the common bile duct measures 0.3 cm.   PANCREAS: The pancreas is poorly visualized due to overlying bowel gas.   RIGHT KIDNEY: The right kidney measures 11.9 cm in length. The renal cortical echogenicity and thickness are within normal limit.  No hydronephrosis or renal calculi are seen.       1. Cholelithiasis without sonographic evidence of acute cholecystitis. 2. Mild hepatomegaly.   I personally reviewed the image(s)/study and resident interpretation as stated by Dr. Heidi Contreras MD. I agree with the findings as stated. This study was interpreted at [facility], [place], [place].   MACRO:  None   Signed by: Jamshid Iva 9/19/2024 7:40 AM Dictation workstation:   [id number]    IR CVC nontunneled    Result Date: 9/18/2024  Interpreted By:  Rakesh Pablo and Ritchie Brandon STUDY: IR CVC NONTUNNELED;  9/18/2024 2:24 pm   INDICATION: Signs/Symptoms:apheresis line placement for RBCEx.   COMPARISON: None.   ACCESSION NUMBER(S): [id number]   ORDERING CLINICIAN: ROSEANNA SOSA   TECHNIQUE: INTERVENTIONALIST(S): MD Vega Hinds, DO, PGY-3   CONSENT: The patient was informed of the nature of the proposed procedure. The purposes, alternatives, risks, and benefits were explained and discussed. All questions were answered and consent was obtained.   RADIATION EXPOSURE: Fluoroscopy time: 0.1 min Dose: 0.54 mGy Dose Area Product (DAP): 122 mGy*cm^2   SEDATION: Moderate conscious IV sedation services (supervision of administration, induction, and maintenance) were provided by the physician performing the procedure with intravenous fentanyl 150mcg and versed 3mg for 20 minutes. The physician was assisted by an independent trained observer, an interventional radiology nurse, in the continuous monitoring of patient level of consciousness and physiologic status. Please note, proximally 100 mcg fentanyl/2 mg Versed were provided via the micro puncture access secondary to IV infiltration.   MEDICATION: None.   TIME OUT: A time out was performed immediately prior to procedure start with the interventional team, correctly identifying the patient name, date of birth, MRN, procedure, anatomy (including marking of site and side), patient position, procedure consent form, relevant laboratory and imaging test results, antibiotic administration, safety precautions, and procedure-specific equipment needs.   COMPLICATIONS: No immediate adverse events identified.   FINDINGS: The patient was positioned supine on the angiography table. The right supraclavicular cutaneous tissues were prepared and draped  in sterile manner.  1% lidocaine local anesthesia was instilled into the subcutaneous soft tissues at the selected access site for local anesthesia. Ultrasound images demonstrate a patent and compressible right internal jugular vein. Utilizing direct ultrasound guidance and micropuncture/Seldinger technique, the right internal jugular vein was accessed. An ultrasound digital spot image was acquired and stored on the  PACS. After confirmation of location, a 018 McFarlan-Mandrel guidewire was introduced and advanced into the inferior vena cava utilizing intermittent fluoroscopy.   The needle was removed with the guidewire left in place and exchanged for a 5-Lithuanian coaxial dilator. The inner dilator and wire were removed and a J-tipped 035 guidewire was introduced through the access sheath.  The skin tract was dilated with successive increase in size of vascular dilators under direct fluoroscopic visualization.   Subsequently, a temporary 11.5 Lithuanian x 15 cm catheter was advanced with its tip overlying the cavoatrial junction under direct fluoroscopic guidance.  The catheter ports were aspirated and flushed with normal saline and charged with heparin. The external portions of the catheter were secured in place with sterile suture dressings.   The patient tolerated the procedure without complication.       1. Technically successful and uncomplicated placement of a right internal jugular temporary apheresis catheter under direct ultrasound and fluoroscopic guidance - optimal catheter tip position at the right atrial superior vena caval junction and the catheter is ready for immediate use.   I was present for and/or performed the critical portions of the procedure and immediately available throughout the entire procedure.   I personally reviewed the image(s) / study and resident interpretation. I agree with the findings as stated.   Performed and dictated at [facility].   MACRO: None.    Signed by: Rakesh Pablo 9/18/2024 2:48 PM Dictation workstation:   [id number]    Vascular US penile artery duplex complete    Result Date: 9/16/2024  Interpreted By:  Nik Edwards and Baker Zachary STUDY: [facility] US PENILE ARTERY DUPLEX COMPLETE;   INDICATION: Signs/Symptoms:sickle cell, persistant priapism.   COMPARISON: Ultrasound penile artery duplex on 09/01/2024.   ACCESSION NUMBER(S): [id number]   ORDERING CLINICIAN: DAE LOPEZ   TECHNIQUE: Multiplanar grayscale ultrasound images of the penis were obtained with color and spectral Doppler evaluation.   FINDINGS: Penile anatomy: The corpus cavernosa, corpus spongiosum, and urethra are within normal limits. The tunica albuginea is intact. No focal soft tissue lesion or fluid collection.   Doppler evaluation: Normal caliber of the bilateral cavernosal arteries, improved compared to prior exam. Intact arterial and venous flow within the bilateral corpus cavernosa, improved compared to prior exam. Flow is noted within the deep dorsal vein. The superficial dorsal vein was not definitively visualized on this exam. Flow is noted within the dorsal arteries bilaterally. The resistive indices in the right and left cavernosal arteries are 1.0 and 0.96 respectively. The velocities within the right and left cavernosal arteries are 88.1 cm/sec and 43.1 cm/sec respectively.       Normal caliber and peak systolic velocities of the bilateral cavernosal arteries, which have improved compared to prior exam. Persistent elevation of the resistive indices on Doppler interrogation of the visualized portions of the cavernosal arteries, which is nonspecific.   I personally reviewed the images/study and I agree with the findings as stated by Dr. Lorne Quintanilla M.D. This study was interpreted at [place], [place].   MACRO: None   Signed by: Nik Fuchs 9/16/2024 9:58 AM Dictation workstation:    [id number]    XR chest 1 view    Result Date: 9/12/2024  Interpreted By:  Sravanthi Combs and Hofer Lindsay STUDY: XR CHEST 1 VIEW;  9/12/2024 8:14 pm   INDICATION: Signs/Symptoms:cp/sob.   COMPARISON: Chest radiograph 08/24/2024   ACCESSION NUMBER(S): [id number]   ORDERING CLINICIAN: MARK BLACKWELL   FINDINGS: AP radiograph of the chest was provided.   CARDIOMEDIASTINAL SILHOUETTE: Cardiomediastinal silhouette is normal in size and configuration.   LUNGS: No pulmonary consolidation. No pleural effusion or pneumothorax.   ABDOMEN: No remarkable upper abdominal findings.   BONES: No acute osseous changes.       1.  No acute cardiopulmonary process.   I personally reviewed the images/study and resident's interpretation and I agree with the findings as stated by Rose English MD (resident radiologist). This study was analyzed and interpreted at [place], [place].   MACRO: None   Signed by: Sravanthi Combs 9/12/2024 10:22 PM Dictation workstation:   [id number]    Vascular US penile artery duplex complete    Result Date: 9/1/2024  Interpreted By:  Curtis Johnson and Afshari Mirak Sohrab STUDY: VAS US PENILE ARTERY DUPLEX COMPLETE; ;  9/1/2024 7:08 pm   INDICATION: Signs/Symptoms:persistent priapism.   COMPARISON: None.   ACCESSION NUMBER(S): [id number]   ORDERING CLINICIAN: WOLF VIERA   TECHNIQUE: Multiplanar grayscale ultrasound images of the penis were obtained with color and spectral Doppler evaluation.   FINDINGS: Penile anatomy: The visualized portions of the corpus cavernosa is within normal limits. The tunica albuginea is intact. No focal soft tissue lesion or fluid collection.   Doppler evaluation: There is a small caliber of bilateral cavernosal arteries. Flow is noted within the deep and superficial dorsal veins. Flow is noted within the dorsal arteries bilaterally. The resistive indices in the right and left cavernosal arteries are 0.76 and 0.9 respectively.  The velocities within the right and left cavernosal arteries are 8.3 cm/sec and 9.1 cm/sec respectively.       *Small caliber of bilateral cavernosal arteries with lack of color flow in portions of both arteries, more on the right. Additionally there is markedly low peak systolic velocities and increased resistive indices on Doppler interrogation of the visualized portions. Findings in the setting of sickle cell disease are compatible with low-flow/ischemic priapism. Urologic consultation is recommended.   I personally reviewed the images/study and I agree with the findings as stated by resident physician Dr. José Miguel Flowers . This study was interpreted at [facility], [place], [place].   MACRO: José Miguel Flowers discussed the significance and urgency of this critical finding by telephone with  WOLF VIERA on 9/1/2024 at 7:16 pm.  (**-RCF-**) Findings:  See findings.   Signed by: Curtis Johnson 9/1/2024 7:27 PM Dictation workstation:   [id number]         Assessment/Plan   Assessment & Plan    The [facility] Integrative Medicine Symptom Management program offers multi-disciplinary supervised care of cancer patients using Integrative Modalities billed to insurance using NCCN and SIO/ASCO guideline-driven practices.  ESAS is obtained prior to and after each treatment by the practitioner       Pre- Post-   Wellbeing   9 9   Coping  9 9   Pain  8 7   Fatigue  6 6   Anxiety  3 3   Depression  2 2   Stress  3 2   Nausea  0 0       Sickle cell disease with acute on chronic pain:   pain related to vaso-occlusive crisis  Defer to primary team for adequate PO/IV pain regimen   Recommend integrative therapy modalities as pt allows:  -Acupuncture; provided pt education today. Attempted with acupuncturist today, pt terminated session stating it was too intense.   -Acupressure; pt completed this with acupuncturist today (  -Gentle bodywork and stretching as tolerated; pt  completed this with acupuncturist today (separate treatment from reiki)  -Reiki/meditation         -Art therapy - pt declined   -Music therapy - consult placed   -Chaplaincy - pt declined   -Pet therapy - consult placed            Thank you for allowing us to participate in the care of this patient. Integrative Medicine Team will continue to follow as needed.  Please contact team with any questions or concerns.           RAAD Lopez-CNP (available by Cheers)  [facility]  Inpatient Integrative Medicine      I spent 60 minutes in the care of this patient which included chart review, interviewing patient/family, discussion with primary team, coordination of care, and documentation.     Medical Decision Making was high level due to high complexity of problems, extensive data review, and high risk of management/treatment.        [facility] General Internal Medicine  General Internal Medicine  [address] [place] [id number]  Phone: [phone]  Fax:   Follow Up Time: 1 week    [facility]  Hematology/Oncology  [address] [place] [address]  Phone: [phone]  Fax:   Follow Up Time: 1 week

## 2024-09-26 NOTE — PROGRESS NOTES
Music Therapy Note    Joellen Narayan     Therapy Session  Referral Type: New referral this admission  Visit Type: Follow-up visit  Session Start Time: 1437  Session End Time: 1440  Intervention Delivery: In-person  Conflict of Service: Declined treatment  Family Present for Session: None     Pre-assessment  Unable to Assess Reason: Outcomes not applicable  Pain Score: 8  Mood/Affect: Appropriate, Calm, Cooperative         Treatment/Interventions  Music Therapy Interventions: Assessment  Interruption: No  Patient Fell Asleep at End of Session: No    Post-assessment  Unable to Assess Reason: Did not provide expressive therapy intervention  Mood/Affect: Appropriate, Calm, Cooperative  Continue Visiting: Yes  Total Session Time (min): 3 minutes    Narrative  Assessment Detail: Patient presented awake and alert, supine in bed, displaying calm affect. Patient declined music intervention at this time, expressing gratitude for MT's attempt.  Follow-up: MT to continue to follow.    Education Documentation  No documentation found.

## 2024-09-26 NOTE — PROGRESS NOTES
Child Life Assessment:   Reason for Consult  Discipline: Child Life Specialist  Reason for Consult: Normalization of environment, Coping skill development/planning  Referral Source: Self  Total Time Spent (min): 20 minutes    Patient Intervention(s)  Type of Intervention Performed: Healing environment interventions  Healing Environment Intervention(s): Normalization of environment, Empathetic listening/validation of emotions, Resources provided    Evaluation  Evaluation/Plan of Care: Provide ongoing support    Session Details: Certified Child Life Specialist (CCLS) continues to provide support to patient throughout extended admission by offering non-pharmacological pain management, activities to normalize the hospital environment, and support through presence.  During this interaction, CCLS and patient discussed the tools provided by staff from [facility].  Patient shared that he has found reiki to be very helpful with relaxing to the point of falling asleep.  CCLS and patient engaged in additional conversation, maintaining rapport.  Child life will continue to provide support to patient throughout admission.    FINESSE Stephens, CCLS  [employer] Secure Chat

## 2024-09-26 NOTE — PROGRESS NOTES
Music Therapy Note    Joellen Narayan     Therapy Session  Referral Type: New referral this admission  Visit Type: Follow-up visit  Session Start Time: 1556  Session End Time: 1559  Intervention Delivery: In-person  Conflict of Service: None  Family Present for Session: None     Pre-assessment  Unable to Assess Reason: Outcomes not applicable  Pain Score: 8  Mood/Affect: Appropriate, Calm, Cooperative         Treatment/Interventions  Music Therapy Interventions: Assessment  Interruption: No  Patient Fell Asleep at End of Session: No    Post-assessment  Unable to Assess Reason: Did not provide expressive therapy intervention  Mood/Affect: Appropriate, Calm, Cooperative  Continue Visiting: Yes  Total Session Time (min): 3 minutes    Narrative  Assessment Detail: Patient presented awake and alert, in bed with HOB raised, displaying calm affect. Patient familiar to this MT from prior admissions. Patient declined music intervention at this time but requested MT return the next day.  Follow-up: MT to continue to follow.    Education Documentation  No documentation found.

## 2024-09-26 NOTE — PROGRESS NOTES
"Joellen Narayan is a 23 y.o. male on day 13 of admission presenting with Sickle cell crisis (Multi).    Subjective   Seen this AM at the bedside. He reports he is doing well today, only has pain in his leg. We discussed plan to decrease pain meds today and see how it goes, and pt agreeable. He is eating/drinking well and having BMs. He denies any fevers/chills, SOB, or CP.    Objective     Physical Exam  Constitutional:       General: He is not in acute distress.     Appearance: He is not toxic-appearing.   HENT:      Head: Normocephalic and atraumatic.   Eyes:      General: Scleral icterus present.      Extraocular Movements: Extraocular movements intact.   Cardiovascular:      Rate and Rhythm: Normal rate and regular rhythm.   Pulmonary:      Effort: No respiratory distress.   Abdominal:      Tenderness: There is abdominal tenderness.   Musculoskeletal:         General: No swelling or tenderness.   Skin:     Coloration: Skin is not jaundiced.      Findings: No bruising or erythema.   Neurological:      Mental Status: He is oriented to person, place, and time. Mental status is at baseline.         Last Recorded Vitals  Blood pressure 123/66, pulse 60, temperature 36.9 °C (98.4 °F), temperature source Temporal, resp. rate 16, height 1.88 m (6' 2\"), weight 69.4 kg (153 lb), SpO2 98%.  Intake/Output last 3 Shifts:  No intake/output data recorded.    Results for orders placed or performed during the hospital encounter of 09/12/24 (from the past 24 hour(s))   Blood Culture    Specimen: Peripheral Venipuncture; Blood culture   Result Value Ref Range    Blood Culture Loaded on Instrument - Culture in progress    Blood Culture    Specimen: Peripheral Venipuncture; Blood culture   Result Value Ref Range    Blood Culture Loaded on Instrument - Culture in progress    Urinalysis with Reflex Culture and Microscopic   Result Value Ref Range    Color, Urine Yellow Light-Yellow, Yellow, Dark-Yellow    Appearance, Urine Clear Clear "    Specific Gravity, Urine 1.011 1.005 - 1.035    pH, Urine 6.5 5.0, 5.5, 6.0, 6.5, 7.0, 7.5, 8.0    Protein, Urine NEGATIVE NEGATIVE, 10 (TRACE), 20 (TRACE) mg/dL    Glucose, Urine Normal Normal mg/dL    Blood, Urine NEGATIVE NEGATIVE    Ketones, Urine NEGATIVE NEGATIVE mg/dL    Bilirubin, Urine NEGATIVE NEGATIVE    Urobilinogen, Urine 6 (2+) (A) Normal mg/dL    Nitrite, Urine NEGATIVE NEGATIVE    Leukocyte Esterase, Urine 250 Andrea/µL (A) NEGATIVE   Extra Urine Gray Tube   Result Value Ref Range    Extra Tube Hold for add-ons.    Microscopic Only, Urine   Result Value Ref Range    WBC, Urine 21-50 (A) 1-5, NONE /HPF    RBC, Urine 1-2 NONE, 1-2, 3-5 /HPF    Bacteria, Urine 1+ (A) NONE SEEN /HPF    Mucus, Urine FEW Reference range not established. /LPF   CBC and Auto Differential   Result Value Ref Range    WBC 11.9 (H) 4.4 - 11.3 x10*3/uL    nRBC 1.4 (H) 0.0 - 0.0 /100 WBCs    RBC 3.27 (L) 4.50 - 5.90 x10*6/uL    Hemoglobin 9.4 (L) 13.5 - 17.5 g/dL    Hematocrit 28.4 (L) 41.0 - 52.0 %    MCV 87 80 - 100 fL    MCH 28.7 26.0 - 34.0 pg    MCHC 33.1 32.0 - 36.0 g/dL    RDW 18.5 (H) 11.5 - 14.5 %    Platelets 454 (H) 150 - 450 x10*3/uL    Neutrophils % 53.2 40.0 - 80.0 %    Immature Granulocytes %, Automated 1.0 (H) 0.0 - 0.9 %    Lymphocytes % 26.5 13.0 - 44.0 %    Monocytes % 12.6 2.0 - 10.0 %    Eosinophils % 6.4 0.0 - 6.0 %    Basophils % 0.3 0.0 - 2.0 %    Neutrophils Absolute 6.31 1.20 - 7.70 x10*3/uL    Immature Granulocytes Absolute, Automated 0.12 0.00 - 0.70 x10*3/uL    Lymphocytes Absolute 3.14 1.20 - 4.80 x10*3/uL    Monocytes Absolute 1.50 (H) 0.10 - 1.00 x10*3/uL    Eosinophils Absolute 0.76 (H) 0.00 - 0.70 x10*3/uL    Basophils Absolute 0.04 0.00 - 0.10 x10*3/uL   Comprehensive Metabolic Panel   Result Value Ref Range    Glucose 76 74 - 99 mg/dL    Sodium 138 136 - 145 mmol/L    Potassium 4.0 3.5 - 5.3 mmol/L    Chloride 102 98 - 107 mmol/L    Bicarbonate 29 21 - 32 mmol/L    Anion Gap 11 10 - 20 mmol/L     Urea Nitrogen 8 6 - 23 mg/dL    Creatinine 0.55 0.50 - 1.30 mg/dL    eGFR >90 >60 mL/min/1.73m*2    Calcium 9.5 8.6 - 10.6 mg/dL    Albumin 4.2 3.4 - 5.0 g/dL    Alkaline Phosphatase 122 (H) 33 - 120 U/L    Total Protein 6.4 6.4 - 8.2 g/dL    AST 42 (H) 9 - 39 U/L    Bilirubin, Total 4.1 (H) 0.0 - 1.2 mg/dL    ALT 38 10 - 52 U/L   Lactate Dehydrogenase   Result Value Ref Range     (H) 84 - 246 U/L   Reticulocytes   Result Value Ref Range    Retic % 10.3 (H) 0.5 - 2.0 %    Retic Absolute 0.335 (H) 0.022 - 0.118 x10*6/uL    Reticulocyte Hemoglobin 32 28 - 38 pg    Immature Retic fraction 18.8 (H) <=16.0 %   Magnesium   Result Value Ref Range    Magnesium 2.14 1.60 - 2.40 mg/dL       Scheduled medications  baclofen, 5 mg, oral, TID  flutamide, 125 mg, oral, TID  folic acid, 1 mg, oral, Daily  gabapentin, 100 mg, oral, q8h REYNALDO  ketoconazole, 200 mg, oral, BID  oxygen, , inhalation, Continuous - Inhalation  pantoprazole, 40 mg, intravenous, Daily  polyethylene glycol, 17 g, oral, Daily  predniSONE, 5 mg, oral, Daily  pseudoephedrine, 60 mg, oral, q8h  sennosides-docusate sodium, 2 tablet, oral, BID      Continuous medications     PRN medications  PRN medications: butalbital-acetaminophen-caff, diphenhydrAMINE, morphine, ondansetron ODT **OR** [DISCONTINUED] ondansetron, oxygen          Assessment/Plan   Assessment & Plan  Sickle cell crisis (Multi)    Joellen Narayan is a 23 y.o. 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 CXR (9/12) negative for acute  cardiopulmonary process. Urology following patient for priapism, upon presentation, patient did not agree to bedside drainage and corporal blood gas. 9/13 started on Ketoconazole 200mg BID and prednisone 5mg daily per urology recs. Penile doppler US ordered 9/15 per urology, showing normal caliber and peak systolic velocities of b/l cavernosal arteries which have improved since prior. Hematology consulted on 9/13 give persistent priapism x1 month, recommend emergent RBCex. 9/13 plan for apheresis line placement in the ED followed by emergent RBCex, however, patient declined placement of line in ED on 9/13, plan for apheresis line placement with IR followed by RBCex on 9/16. On 9/16 he then again declined apheresis line placement and RBCEx. Pain was stable x2 days until again 9/18 pt endorsed significant groin pain, penis still bendable. Discussed with urology, NTD. Tbili up to 8.0 (9/18), 11.1 (9/19); RUQ US with cholelithiasis without cholecystitis (9/18). Pt became amenable to RBCEx, s/p apheresis line 9/18 and s/p RBC exchange pt 9/19. On 9/20, lysis labs improving post exchange. Apheresis line removed, EKG, CXR and troponin were unremarkable. Urology re-engaged on 9/20 for continued groin/shaft pain localized around site from recent urology intervention with minimal improvement clinically since RBCex; no interventions needed. 9/22 developed epigastric pain, given recent choledocholithiasis, amylase and lipase sent (negative), troponin and CXR also negative 9/21. S/p Rotating PO oxycodone and IVP dilaudid; (9/23-current) started IV morphine PRN for pain management. Increased leukocytosis noted 9/25, infectious workup started however pt afebrile with no s/sx of infectious process. CXR (9/25) negative for acute process. BCX x2 (9/25) NGTD. UA (9/25) with +leuks, 1+ bacteria; UCX pending, pt asymptomatic so will hold off on starting atbs unless Ucx positive. Leukocytosis improving as of 9/26. Discharge pending  improvement in pain, possibly tomorrow 9/27.     # Leukocytosis-- improving  -  9/25 WBC 17.2, increased significantly from 9/24 WBC 9.7--> downtrending 9/26 at 11.9  - likely reactive to pain vs infectious process as pt afebrile with no s/sx of infectious process  - CXR 9/21 negative for acute process, troponin negative, EKG NSR  - CXR (9/25): negative for acute process  - UA (9/25): +leuks, 1+ bacteria; UCX pending; pt asymptomatic so will hold off on starting atbs unless Ucx positive  - Blood cultures x 2 (9/25): NGTD  - Low threshold to start abx if pt becomes febrile, has new infectious symptoms    # Priapism  - Presented to the ED with worsening priapism since 5pm (hadn't resolved from last admission)  - s/p recent admission 8/31-9/9, OR with urology for priapism drainage, penile block, and corpora cavernosa irrigation  - Also, s/p scheduled Sudafed q8h and added ibuprofen, baclofen 5mg TID and gabapentin 100mg TID  - Penile doppler US while prior inpatient (9/1) showing small caliber of bilateral cavernosal arteries with lack of color flow in portions of both arteries, more on the right, c/w low-flow/ischemic priapism. Urology notified, s/p phenylephrine injection with urology 9/1 evening with partial resolution of priapism  - Reassessed by Urology 9/7/24 reevaluate penile appearance; firm but still bendable with no increased pain. Rec to continue management as prior and to maintain outpatient appointment on 9/10/24.  - Outpatient appointment 9/10 prescribed Flutamide 125mg TID but per parent, no pharmacy has this medication so it was not started  - Doppler U/S completed outpatient 9/10 noted arterial flow  - Urology following rec bedside drainage and corporal blood gas and phenylepinephine injection --patient refusing; also rec starting treatment with ketoconazole with prednisone, 9/15 rec penile doppler US    - Penile doppler US (9/15) per urology, showing normal caliber and peak systolic velocities of b/l  cavernosal arteries which have improved since prior.   - Heme following and rec emergent RBCex in setting of prolonged priapism  - Continue home Sudafed q8h prn and added ibuprofen, Baclofen 5mg TID and Gabapentin 100mg TID  - 9/13 started on ketoconazole 200mg BID and Prednisone 5mg daily-per urology recs pt to be dc'd with this  - C. Trachomatis/N. Gonorrhoeae (9/14): negative   - Initially planned for aphesis line placement followed by RBCex on 9/13, however, patient declined placement of apheresis line in the ED, primary, hematology and urology team explained risks to patient and he continued to decline   - Planed for apheresis line placement in IR on 9/16 followed by RBCex - hematology, Dr. Cruz, and trransfusion med team aware--> however on 9/16 pt declined apheresis line placement and RBCEx again  - 9/16 pt was educated on risks of refusing/delaying medical care and recurrent priapism  - Pain was stable x2 days until again 9/18 pt endorsed significant groin pain, penis still bendable. Discussed with urology, pt to be re-evaluated.   - RUQ US with cholelithiasis without cholecystitis (9/18)   - Pt amenable to RBCEx, IR placed apheresis line 9/18 and s/p RBCex 9/19   - Tbili increased to 8.0 (9/18) --> 11.1 (9/19) --> 6.3 (9/20) s/p RBCex . 9/25 tbili 3.7  - On 9/19, patient reporting slight improvement of groin pain, mild RLQ tenderness  - Urology re-engaged on 9/20 for continued groin/shaft pain localized around site from recent urology intervention with minimal improvement clinically since RBCex; no interventions at this time      # Hgb SS with severe pain   - OARRS reviewed, no aberrancies  - No current care path  - Hgb baseline ~8.5; currently stable, no indication for simple transfusion   - Tbili baseline fluctuates ~5-1; Tbili 4.4 on 9/16, up to 6.0 on 9/17. 9/26 Tbili 4.1  - LDH baseline ~500   on 9/16. 9/26   - s/p IV Dilaudid 2mg q2hrs PRN severe pain (9/12-9/16)  - 9/16 weaned pain meds  to IV dilaudid 1mg q3hrs PRN severe pain  - Overnight patient lost IV access, refused subcutaneous dilaudid but since pain is improving agreeable to start 20mg PO oxycodone Q4 hours for severe pain (9/19)   - given uncontrolled pain, s/p patient switched to rotating PO oxycodone 15mg and IVP dilaudid 2mg q2h (9/19-9/23)  - (9/23-9/26) pt's pain was uncontrollable overnight, pt previously on IV morphine. Pt was started on IV morphine 10mg q2h PRN for severe pain  - 9/26 weaned pain meds to IV Morphine 6mg q2hrs PRN severe pain  - Continue folic acid 1mg daily  - Hgb S (9/12): 59.8%   - Repeat Hgb S (9/18): 59.9%  - Hgb S post exchange 19.5%   - Apheresis line removed, CXR and troponin unremarkable (ordered d/t chest pain after line was removed. EKG NSR.   - PO Zofran PRN for opioid-induced nausea, PO Benadryl PRN for opioid-induced pruritus, Bowel regimen for opioid-induced constipation with DocuSenna 2tabs BID and Miralax daily  - Psychiatry was consulted (9/23) for anxiety, rec no medications at this time and signed off  - Integrative medicine following    # Recent Choledocholithiasis  # Epigastric pain   - s/p ERCP 7/18/24 with a biliary sphincterotomy where sludge and stones were removed, achieving complete clearance  - Seen by gen surg 8/8/24 Dr. Dove who rec lap cholecystectomy d/t the chance of recurrence of choledocholithiasis  - Surgery has yet to be scheduled, has FUV with gen surg again 9/30  - Tbili 4.4 on 9/16, (recent peak at 52.8 prior to ERCP during recent hospital admission); increased to 8.9 (9/18) --> 11.1 (9/19) --> 6.3 (9/20) s/p RBCex--> Tbili 4.1 (9/26)  - RUQ US with cholelithiasis without cholecystitis (9/18)   - Pt reports epigastric pain 9/22, PPI changed to IV   - Amylase and lipase negative (9/22)  - CXR 9/21 negative for acute process, troponin negative   - Low threshold for CT AP if worsening pain, though improving as of (9/23-current)      # Nodular lymphocyte predominant Hodgkins  "lymphoma (NLPHL)   - Primary Oncologist: Dr. Stoll  - Enlarged lymph nodes noted 4/1/22  - RUQ US (11/14/22) with mildly enlarged LNs in the region of the kavin hepatis  - MRI liver (11/16/22) showed re-demonstration of bulky retroperitoneal lymphadenopathy and kavin hepatic lymphadenopathy    - (11/18/22) lymph node biopsy showed atypical lymphoid infiltrate. Reviewed by Hemepath board, insufficient for lymphoma diagnosis  - PET/CT (12/6/22) showing retroperitoneal lymphadenopathy  - Followed up with Dr. Stoll (12/16/22) with plan for surg/onc consult for large tissue bx but patient missed apt and was lost to follow up  - Requested new apt with Dr. Ronnie Marte 6/19/23, patient was not seen and lost to follow up  - CT a/p (11/28/23) increased size of retroperitoneal lymph nodes reflecting extramedullary hematopoiesis I/s/o sickle cell vs lymphoma  - Paraaortic LN bx via IR (11/30/23) consistent with NLPHL. Flow: no clonal B cell or T cell population identified, lymphocyte 95%, CD3+CD4+ 68%, CD3+CD8+ 7%, CD19+ 24%  - Elevated LDH/bili partially from sickle cell disease   - Chemotherapy (R-CHOP) was discussed with primary oncologist Dr. Stoll, and decision was made to simplify his chemotherapy to Rituxan and prednisone q3 weeks mainly due to frequent sickle cell crisis  - Current chemo regimen: Rituxan and prednisone q3 weeks (C1 1/18/24, C2 2/8/24, Missed C3 d/t sickle cell pain crisis, C4 4/4/24, C5 5/16/24, C6 6/7/24)  - Per pt no longer taking Acyclovir 400mg BID prophy   - PET CT (7/25) overall showing great response to treatment with persistent residual viable disease involving a left common iliac node  - Last FUV with Dr. Stoll 7/25/24 rec RTC in 2 months (next FUV scheduled for 10/22)     # Hx of nocturnal oxygen dependence and hypoxia on room air  - Pt currently has home 2L supp O2   - Wean as able, encourage incentive spirometry  - Pulm FUV 8/8- \"Given his history of sickle cell disease, it is " "important to ensure he has formal sleep testing to look at desaturations and presence of sleep apnea despite not having a convincing history/body habitus typical for suspecting sleep apnea- patients with sickle cell have a higher prevalence of sleep disorders including nocturnal hypoxemia\"--> rec sleep referral for formal testing if deemed appropriate, ABG and O2 destat testing, and TTE with bubble study  - TTE 8/23 showing EF 60-65%, severely dilated LV and LA, + intrapulmonary shunting       DVT prophy: Lovenox subcutaneous, SCDs, encourage ambulation     DISPO:  - Full code, confirmed on admit  - Discharge pending improvement in pain, possibly tomorrow 9/27  - SUSIE Narayan (Parent) [phone]  - Sickle cell FUV 10/9, Dr. Stoll FUV 10/22, Gen Surg FUV 9/30, Urology FUV 10/8, ENT 11/19    I spent >60 minutes in the professional and overall care of this patient.    Assessment and plan as above discussed with attending physician Dr. Alex Jenkins, APRN-CNP  "

## 2024-09-27 ENCOUNTER — APPOINTMENT (OUTPATIENT)
Dept: RADIOLOGY | Facility: HOSPITAL | Age: 24
DRG: 812 | End: 2024-09-27
Payer: COMMERCIAL

## 2024-09-27 LAB
ALBUMIN SERPL BCP-MCNC: 4.4 G/DL (ref 3.4–5)
ALP SERPL-CCNC: 140 U/L (ref 33–120)
ALT SERPL W P-5'-P-CCNC: 42 U/L (ref 10–52)
ANION GAP SERPL CALC-SCNC: 14 MMOL/L (ref 10–20)
APPEARANCE UR: CLEAR
AST SERPL W P-5'-P-CCNC: 47 U/L (ref 9–39)
BACTERIA UR CULT: ABNORMAL
BASOPHILS # BLD AUTO: 0.06 X10*3/UL (ref 0–0.1)
BASOPHILS NFR BLD AUTO: 0.4 %
BILIRUB SERPL-MCNC: 4.9 MG/DL (ref 0–1.2)
BILIRUB UR STRIP.AUTO-MCNC: NEGATIVE MG/DL
BUN SERPL-MCNC: 10 MG/DL (ref 6–23)
CALCIUM SERPL-MCNC: 9.9 MG/DL (ref 8.6–10.6)
CHLORIDE SERPL-SCNC: 102 MMOL/L (ref 98–107)
CO2 SERPL-SCNC: 26 MMOL/L (ref 21–32)
COLOR UR: YELLOW
CREAT SERPL-MCNC: 0.61 MG/DL (ref 0.5–1.3)
EGFRCR SERPLBLD CKD-EPI 2021: >90 ML/MIN/1.73M*2
EOSINOPHIL # BLD AUTO: 0.71 X10*3/UL (ref 0–0.7)
EOSINOPHIL NFR BLD AUTO: 4.2 %
ERYTHROCYTE [DISTWIDTH] IN BLOOD BY AUTOMATED COUNT: 17.8 % (ref 11.5–14.5)
GLUCOSE SERPL-MCNC: 64 MG/DL (ref 74–99)
GLUCOSE UR STRIP.AUTO-MCNC: NORMAL MG/DL
HCT VFR BLD AUTO: 28.8 % (ref 41–52)
HGB BLD-MCNC: 9.7 G/DL (ref 13.5–17.5)
HGB RETIC QN: 31 PG (ref 28–38)
IMM GRANULOCYTES # BLD AUTO: 0.1 X10*3/UL (ref 0–0.7)
IMM GRANULOCYTES NFR BLD AUTO: 0.6 % (ref 0–0.9)
IMMATURE RETIC FRACTION: 15.5 %
KETONES UR STRIP.AUTO-MCNC: NEGATIVE MG/DL
LDH SERPL L TO P-CCNC: 374 U/L (ref 84–246)
LEUKOCYTE ESTERASE UR QL STRIP.AUTO: ABNORMAL
LYMPHOCYTES # BLD AUTO: 2.1 X10*3/UL (ref 1.2–4.8)
LYMPHOCYTES NFR BLD AUTO: 12.4 %
MCH RBC QN AUTO: 29 PG (ref 26–34)
MCHC RBC AUTO-ENTMCNC: 33.7 G/DL (ref 32–36)
MCV RBC AUTO: 86 FL (ref 80–100)
MONOCYTES # BLD AUTO: 1.87 X10*3/UL (ref 0.1–1)
MONOCYTES NFR BLD AUTO: 11 %
MUCOUS THREADS #/AREA URNS AUTO: ABNORMAL /LPF
NEUTROPHILS # BLD AUTO: 12.14 X10*3/UL (ref 1.2–7.7)
NEUTROPHILS NFR BLD AUTO: 71.4 %
NITRITE UR QL STRIP.AUTO: NEGATIVE
NRBC BLD-RTO: 1 /100 WBCS (ref 0–0)
PH UR STRIP.AUTO: 6 [PH]
PLATELET # BLD AUTO: 474 X10*3/UL (ref 150–450)
POTASSIUM SERPL-SCNC: 4.1 MMOL/L (ref 3.5–5.3)
PROT SERPL-MCNC: 6.8 G/DL (ref 6.4–8.2)
PROT UR STRIP.AUTO-MCNC: NEGATIVE MG/DL
RBC # BLD AUTO: 3.35 X10*6/UL (ref 4.5–5.9)
RBC # UR STRIP.AUTO: NEGATIVE /UL
RBC #/AREA URNS AUTO: ABNORMAL /HPF
RETICS #: 0.33 X10*6/UL (ref 0.02–0.12)
RETICS/RBC NFR AUTO: 10 % (ref 0.5–2)
SODIUM SERPL-SCNC: 138 MMOL/L (ref 136–145)
SP GR UR STRIP.AUTO: 1.01
UROBILINOGEN UR STRIP.AUTO-MCNC: ABNORMAL MG/DL
WBC # BLD AUTO: 17 X10*3/UL (ref 4.4–11.3)
WBC #/AREA URNS AUTO: >50 /HPF

## 2024-09-27 PROCEDURE — 72170 X-RAY EXAM OF PELVIS: CPT

## 2024-09-27 PROCEDURE — 2500000004 HC RX 250 GENERAL PHARMACY W/ HCPCS (ALT 636 FOR OP/ED)

## 2024-09-27 PROCEDURE — 83615 LACTATE (LD) (LDH) ENZYME: CPT

## 2024-09-27 PROCEDURE — 85045 AUTOMATED RETICULOCYTE COUNT: CPT

## 2024-09-27 PROCEDURE — 99233 SBSQ HOSP IP/OBS HIGH 50: CPT

## 2024-09-27 PROCEDURE — 36415 COLL VENOUS BLD VENIPUNCTURE: CPT

## 2024-09-27 PROCEDURE — 72170 X-RAY EXAM OF PELVIS: CPT | Performed by: RADIOLOGY

## 2024-09-27 PROCEDURE — 2500000001 HC RX 250 WO HCPCS SELF ADMINISTERED DRUGS (ALT 637 FOR MEDICARE OP)

## 2024-09-27 PROCEDURE — 85025 COMPLETE CBC W/AUTO DIFF WBC: CPT

## 2024-09-27 PROCEDURE — 80053 COMPREHEN METABOLIC PANEL: CPT

## 2024-09-27 PROCEDURE — 81001 URINALYSIS AUTO W/SCOPE: CPT

## 2024-09-27 PROCEDURE — 2500000004 HC RX 250 GENERAL PHARMACY W/ HCPCS (ALT 636 FOR OP/ED): Performed by: NURSE PRACTITIONER

## 2024-09-27 PROCEDURE — 1170000001 HC PRIVATE ONCOLOGY ROOM DAILY

## 2024-09-27 PROCEDURE — 2500000005 HC RX 250 GENERAL PHARMACY W/O HCPCS

## 2024-09-27 RX ORDER — KETOROLAC TROMETHAMINE 30 MG/ML
30 INJECTION, SOLUTION INTRAMUSCULAR; INTRAVENOUS EVERY 6 HOURS SCHEDULED
Status: COMPLETED | OUTPATIENT
Start: 2024-09-27 | End: 2024-09-28

## 2024-09-27 RX ORDER — MORPHINE SULFATE 4 MG/ML
6 INJECTION INTRAVENOUS EVERY 4 HOURS PRN
Status: DISCONTINUED | OUTPATIENT
Start: 2024-09-27 | End: 2024-09-28 | Stop reason: HOSPADM

## 2024-09-27 ASSESSMENT — PAIN - FUNCTIONAL ASSESSMENT
PAIN_FUNCTIONAL_ASSESSMENT: 0-10

## 2024-09-27 ASSESSMENT — PAIN SCALES - GENERAL
PAINLEVEL_OUTOF10: 7
PAINLEVEL_OUTOF10: 8
PAINLEVEL_OUTOF10: 6
PAINLEVEL_OUTOF10: 6
PAINLEVEL_OUTOF10: 8
PAINLEVEL_OUTOF10: 8
PAINLEVEL_OUTOF10: 9
PAINLEVEL_OUTOF10: 8
PAINLEVEL_OUTOF10: 9
PAINLEVEL_OUTOF10: 6

## 2024-09-27 ASSESSMENT — COGNITIVE AND FUNCTIONAL STATUS - GENERAL
DAILY ACTIVITIY SCORE: 24
DAILY ACTIVITIY SCORE: 24
MOBILITY SCORE: 24
MOBILITY SCORE: 24

## 2024-09-27 NOTE — CARE PLAN
The patient's goals for the shift include      The clinical goals for the shift include patient will rate pain less than 9/10 this shift 9/27/24 1900      Problem: Pain  Goal: Takes deep breaths with improved pain control throughout the shift  Outcome: Progressing  Goal: Turns in bed with improved pain control throughout the shift  Outcome: Progressing  Goal: Walks with improved pain control throughout the shift  Outcome: Progressing  Goal: Performs ADL's with improved pain control throughout shift  Outcome: Progressing  Goal: Participates in PT with improved pain control throughout the shift  Outcome: Progressing  Goal: Free from opioid side effects throughout the shift  Outcome: Progressing  Goal: Free from acute confusion related to pain meds throughout the shift  Outcome: Progressing     Problem: Safety - Adult  Goal: Free from fall injury  Outcome: Progressing

## 2024-09-27 NOTE — PROGRESS NOTES
"Joellen Narayan is a 23 y.o. male on day 14 of admission presenting with Sickle cell crisis (Multi).    Subjective   Patient seen at bedside. States that his pain has worsened a little bit since yesterday when his morphine was decreased. Rates pain 9/10 in his legs and states that it is also in his back and chest. Denies SOB, heart palpitations, N/V/D/C, headache dizziness or vision changes. Discussed discharge today and patient states that he does not feel safe discharging today with his increased pain but will be agreeable tomorrow      Objective     Physical Exam  HENT:      Head: Normocephalic.      Nose: Nose normal.      Mouth/Throat:      Mouth: Mucous membranes are moist.   Eyes:      Pupils: Pupils are equal, round, and reactive to light.   Cardiovascular:      Rate and Rhythm: Normal rate.      Pulses: Normal pulses.   Pulmonary:      Effort: Pulmonary effort is normal.   Abdominal:      Palpations: Abdomen is soft.   Musculoskeletal:         General: Normal range of motion.      Cervical back: Normal range of motion.   Skin:     General: Skin is warm.      Capillary Refill: Capillary refill takes less than 2 seconds.   Neurological:      General: No focal deficit present.      Mental Status: He is alert.   Psychiatric:         Mood and Affect: Mood normal.         Behavior: Behavior normal.         Last Recorded Vitals  Blood pressure 104/51, pulse 91, temperature 37.5 °C (99.5 °F), temperature source Temporal, resp. rate 16, height 1.88 m (6' 2\"), weight 69.4 kg (153 lb), SpO2 96%.  Intake/Output last 3 Shifts:  I/O last 3 completed shifts:  In: - (0 mL/kg)   Out: 2225 (32.1 mL/kg) [Urine:2225 (0.9 mL/kg/hr)]  Weight: 69.4 kg     Relevant Results                              Assessment/Plan   Assessment & Plan    Joellen Narayan is a 23 y.o. 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 CXR (9/12) negative for acute cardiopulmonary process. Urology following patient for priapism, upon presentation, patient did not agree to bedside drainage and corporal blood gas. 9/13 started on Ketoconazole 200mg BID and prednisone 5mg daily per urology recs. Penile doppler US ordered 9/15 per urology, showing normal caliber and peak systolic velocities of b/l cavernosal arteries which have improved since prior. Hematology consulted on 9/13 give persistent priapism x1 month, recommend emergent RBCex. 9/13 plan for apheresis line placement in the ED followed by emergent RBCex, however, patient declined placement of line in ED on 9/13, plan for apheresis line placement with IR followed by RBCex on 9/16. On 9/16 he then again declined apheresis line placement and RBCEx. Pain was stable x2 days until again 9/18 pt endorsed significant groin pain, penis still bendable. Discussed with urology, NTD. Tbili up to 8.0 (9/18), 11.1 (9/19); RUQ US with cholelithiasis without cholecystitis (9/18). Pt became amenable to RBCEx, s/p apheresis line 9/18 and s/p RBC exchange pt 9/19. On 9/20, lysis labs improving post exchange. Apheresis line removed, EKG, CXR and troponin were unremarkable. Urology re-engaged on 9/20 for continued groin/shaft pain localized around site from recent urology intervention with minimal improvement clinically since RBCex; no interventions needed. 9/22 developed epigastric pain, given recent choledocholithiasis, amylase and lipase sent (negative), troponin and CXR also negative 9/21. S/p Rotating PO oxycodone and IVP dilaudid; (9/23-current) started IV morphine PRN for pain  management. Increased leukocytosis noted 9/25, infectious workup started however pt afebrile with no s/sx of infectious process. CXR (9/25) negative for acute process. BCX x2 (9/25) NGTD. UA (9/25) with +leuks, 1+ bacteria; UCX showing multiple organisms likely contaminate. Repeat sent. Since patient is asymptomatic for UTI will hold off on starting atbs. Leukocytosis improving. Discharge pending improvement in pain, possibly tomorrow 9/28     # Leukocytosis-- improving  -  9/25 WBC 17.2, increased significantly from 9/24 WBC 9.7 to 17.0 on 9/27  - likely reactive to pain vs infectious process as pt afebrile with no s/sx of infectious process  - CXR 9/21 negative for acute process, troponin negative, EKG NSR  - CXR (9/25): negative for acute process  - UA (9/25): +leuks, 1+ bacteria; UCX showing multiple organisms likely contaminate. Repeat sent. Since patient is asymptomatic for UTI will hold off on starting atbs  - Repeat UA+culture sent 9/27-pending   - Blood cultures x 2 (9/25): NGTD  - Low threshold to start abx if pt becomes febrile, has new infectious symptoms     # Priapism-resolved   - Presented to the ED with worsening priapism since 5pm (hadn't resolved from last admission)  - s/p recent admission 8/31-9/9, OR with urology for priapism drainage, penile block, and corpora cavernosa irrigation  - Also, s/p scheduled Sudafed q8h and added ibuprofen, baclofen 5mg TID and gabapentin 100mg TID  - Penile doppler US while prior inpatient (9/1) showing small caliber of bilateral cavernosal arteries with lack of color flow in portions of both arteries, more on the right, c/w low-flow/ischemic priapism. Urology notified, s/p phenylephrine injection with urology 9/1 evening with partial resolution of priapism  - Reassessed by Urology 9/7/24 reevaluate penile appearance; firm but still bendable with no increased pain. Rec to continue management as prior and to maintain outpatient appointment on 9/10/24.  - Outpatient  appointment 9/10 prescribed Flutamide 125mg TID but per parent, no pharmacy has this medication so it was not started  - Doppler U/S completed outpatient 9/10 noted arterial flow  - Urology following rec bedside drainage and corporal blood gas and phenylepinephine injection --patient refusing; also rec starting treatment with ketoconazole with prednisone, 9/15 rec penile doppler US    - Penile doppler US (9/15) per urology, showing normal caliber and peak systolic velocities of b/l cavernosal arteries which have improved since prior.   - Heme following and rec emergent RBCex in setting of prolonged priapism  - Continue home Sudafed q8h prn and added ibuprofen, Baclofen 5mg TID and Gabapentin 100mg TID  - 9/13 started on ketoconazole 200mg BID and Prednisone 5mg daily-per urology recs pt to be dc'd with this  - C. Trachomatis/N. Gonorrhoeae (9/14): negative   - Initially planned for aphesis line placement followed by RBCex on 9/13, however, patient declined placement of apheresis line in the ED, primary, hematology and urology team explained risks to patient and he continued to decline   - Planed for apheresis line placement in IR on 9/16 followed by RBCex - hematology, Dr. Cruz, and trransfusion med team aware--> however on 9/16 pt declined apheresis line placement and RBCEx again  - 9/16 pt was educated on risks of refusing/delaying medical care and recurrent priapism  - Pain was stable x2 days until again 9/18 pt endorsed significant groin pain, penis still bendable. Discussed with urology, pt to be re-evaluated.   - RUQ US with cholelithiasis without cholecystitis (9/18)   - Pt amenable to RBCEx, IR placed apheresis line 9/18 and s/p RBCex 9/19   - Tbili increased to 8.0 (9/18) --> 11.1 (9/19) --> 6.3 (9/20) s/p RBCex . 9/25 tbili 3.7  - On 9/19, patient reporting slight improvement of groin pain, mild RLQ tenderness  - Urology re-engaged on 9/20 for continued groin/shaft pain localized around site from recent  urology intervention with minimal improvement clinically since RBCex; no interventions at this time      # Hgb SS with severe pain   - OARRS reviewed, no aberrancies  - No current care path  - Hgb baseline ~8.5; currently stable, no indication for simple transfusion   - Tbili baseline fluctuates ~5-1; Tbili 4.4 on 9/16, up to 6.0 on 9/17. 9/26 Tbili 4.1  - LDH baseline ~500   on 9/16. 9/26   - s/p IV Dilaudid 2mg q2hrs PRN severe pain (9/12-9/16)  - 9/16 weaned pain meds to IV dilaudid 1mg q3hrs PRN severe pain  - Overnight patient lost IV access, refused subcutaneous dilaudid but since pain is improving agreeable to start 20mg PO oxycodone Q4 hours for severe pain (9/19)   - given uncontrolled pain, s/p patient switched to rotating PO oxycodone 15mg and IVP dilaudid 2mg q2h (9/19-9/23)  - (9/23-9/26) pt's pain was uncontrollable overnight, pt previously on IV morphine. Pt was started on IV morphine 10mg q2h PRN for severe pain  - 9/26 weaned pain meds to IV Morphine 6mg q2hrs PRN severe pain  - 9/27 started rotating with morphine 6 mg q4 hours and home oxycodone 15mg q4 hours   - Continue folic acid 1mg daily  - Hgb S (9/12): 59.8%   - Repeat Hgb S (9/18): 59.9%  - Hgb S post exchange 19.5%   - Apheresis line removed, CXR and troponin unremarkable (ordered d/t chest pain after line was removed. EKG NSR.   - PO Zofran PRN for opioid-induced nausea, PO Benadryl PRN for opioid-induced pruritus, Bowel regimen for opioid-induced constipation with DocuSenna 2tabs BID and Miralax daily  - Psychiatry was consulted (9/23) for anxiety, rec no medications at this time and signed off  - Integrative medicine following     # Recent Choledocholithiasis  # Epigastric pain   - s/p ERCP 7/18/24 with a biliary sphincterotomy where sludge and stones were removed, achieving complete clearance  - Seen by gen surg 8/8/24 Dr. Dove who rec lap cholecystectomy d/t the chance of recurrence of choledocholithiasis  - Surgery has  yet to be scheduled, has FUV with gen surg again 9/30  - Tbili 4.4 on 9/16, (recent peak at 52.8 prior to ERCP during recent hospital admission); increased to 8.9 (9/18) --> 11.1 (9/19) --> 6.3 (9/20) s/p RBCex--> Tbili 4.1 (9/26)  - RUQ US with cholelithiasis without cholecystitis (9/18)   - Pt reports epigastric pain 9/22, PPI changed to IV   - Amylase and lipase negative (9/22)  - CXR 9/21 negative for acute process, troponin negative   - Low threshold for CT AP if worsening pain, though improving as of (9/23-current)      # Nodular lymphocyte predominant Hodgkins lymphoma (NLPHL)   - Primary Oncologist: Dr. Stoll  - Enlarged lymph nodes noted 4/1/22  - RUQ US (11/14/22) with mildly enlarged LNs in the region of the kavin hepatis  - MRI liver (11/16/22) showed re-demonstration of bulky retroperitoneal lymphadenopathy and kavin hepatic lymphadenopathy    - (11/18/22) lymph node biopsy showed atypical lymphoid infiltrate. Reviewed by Hemepath board, insufficient for lymphoma diagnosis  - PET/CT (12/6/22) showing retroperitoneal lymphadenopathy  - Followed up with Dr. Stoll (12/16/22) with plan for surg/onc consult for large tissue bx but patient missed apt and was lost to follow up  - Requested new apt with Dr. Ronnie Marte 6/19/23, patient was not seen and lost to follow up  - CT a/p (11/28/23) increased size of retroperitoneal lymph nodes reflecting extramedullary hematopoiesis I/s/o sickle cell vs lymphoma  - Paraaortic LN bx via IR (11/30/23) consistent with NLPHL. Flow: no clonal B cell or T cell population identified, lymphocyte 95%, CD3+CD4+ 68%, CD3+CD8+ 7%, CD19+ 24%  - Elevated LDH/bili partially from sickle cell disease   - Chemotherapy (R-CHOP) was discussed with primary oncologist Dr. Stoll, and decision was made to simplify his chemotherapy to Rituxan and prednisone q3 weeks mainly due to frequent sickle cell crisis  - Current chemo regimen: Rituxan and prednisone q3 weeks (C1 1/18/24, C2 2/8/24,  "Missed C3 d/t sickle cell pain crisis, C4 4/4/24, C5 5/16/24, C6 6/7/24)  - Per pt no longer taking Acyclovir 400mg BID prophy   - PET CT (7/25) overall showing great response to treatment with persistent residual viable disease involving a left common iliac node  - Last FUV with Dr. Stoll 7/25/24 rec RTC in 2 months (next FUV scheduled for 10/22)     # Hx of nocturnal oxygen dependence and hypoxia on room air  - Pt currently has home 2L supp O2   - Wean as able, encourage incentive spirometry  - Pulm FUV 8/8- \"Given his history of sickle cell disease, it is important to ensure he has formal sleep testing to look at desaturations and presence of sleep apnea despite not having a convincing history/body habitus typical for suspecting sleep apnea- patients with sickle cell have a higher prevalence of sleep disorders including nocturnal hypoxemia\"--> rec sleep referral for formal testing if deemed appropriate, ABG and O2 destat testing, and TTE with bubble study  - TTE 8/23 showing EF 60-65%, severely dilated LV and LA, + intrapulmonary shunting       DVT prophy: Lovenox subcutaneous, SCDs, encourage ambulation     DISPO:  - Full code, confirmed on admit  - Discharge pending improvement in pain, possibly tomorrow 9/28  - SUSIE Narayan (Parent) [phone]  - Sickle cell FUV 10/9, Dr. Stoll FUV 10/22, Gen Surg FUV 9/30, Urology FUV 10/8, ENT 11/19       I spent 60 minutes in the professional and overall care of this patient.      Marie Modi, RAAD-CNP  Patient discussed with Dr. Parnell. Labs and vitals reviewed       "

## 2024-09-27 NOTE — PROGRESS NOTES
09/16/24 1000   Discharge Planning   Who is requesting discharge planning? Provider   Home or Post Acute Services None   Expected Discharge Disposition Home   Does the patient need discharge transport arranged? No     9/16/24 @ 1050  Patient admitted for chest and penile pain due to sickle cell crisis. Urology following patient. Patient to get exchanged today per team. Anticipate no needs at discharge. Will continue to follow patient during his hospital stay for any needs that may arise.  MARIELA Lo    9/19/24 @ 1155  Patient to get exchanged today. At this time, patient remains no needs at discharge. Will continue to follow.  MARIELA Lo    9/20/24 @ 1220  Patient active with [facility] for his home 3L nocturnal oxygen.  He has no portability. Per team, patient using oxygen during the day during this stay. CARLYLE Arana NP to order walking pulse ox prior to discharge to see if patient qualifies for continuous oxygen. Referral placed in [facility] to [facility].  Will continue to follow patient. ADOD this weekend.    UPDATE 1225:  Per [facility], patient is active for 2L cont as of 6/25/24. CARLYLE Arana NP made aware.  MARIELA Lo    9/24/24 @ 1030  Per team, discharge pending pain improvement. Patient to resume home O2 with [facility]. ADOD today vs tomorrow.   MARIELA Lo    9/27/24 @ 1205  Patient still having increased pain. When pain is controlled, patient will discharge home on resumed home oxygen. ADOD 9/28.  Mari Ko RN [facility]

## 2024-09-28 ENCOUNTER — PHARMACY VISIT (OUTPATIENT)
Dept: PHARMACY | Facility: CLINIC | Age: 24
End: 2024-09-28
Payer: MEDICARE

## 2024-09-28 VITALS
DIASTOLIC BLOOD PRESSURE: 66 MMHG | SYSTOLIC BLOOD PRESSURE: 112 MMHG | HEART RATE: 71 BPM | WEIGHT: 153 LBS | TEMPERATURE: 97.9 F | OXYGEN SATURATION: 99 % | BODY MASS INDEX: 19.64 KG/M2 | RESPIRATION RATE: 16 BRPM | HEIGHT: 74 IN

## 2024-09-28 LAB
ALBUMIN SERPL BCP-MCNC: 4.2 G/DL (ref 3.4–5)
ALP SERPL-CCNC: 132 U/L (ref 33–120)
ALT SERPL W P-5'-P-CCNC: 46 U/L (ref 10–52)
ANION GAP SERPL CALC-SCNC: 13 MMOL/L (ref 10–20)
AST SERPL W P-5'-P-CCNC: 50 U/L (ref 9–39)
BASOPHILS # BLD AUTO: 0.03 X10*3/UL (ref 0–0.1)
BASOPHILS NFR BLD AUTO: 0.2 %
BILIRUB SERPL-MCNC: 4.5 MG/DL (ref 0–1.2)
BUN SERPL-MCNC: 11 MG/DL (ref 6–23)
CALCIUM SERPL-MCNC: 9.4 MG/DL (ref 8.6–10.6)
CHLORIDE SERPL-SCNC: 103 MMOL/L (ref 98–107)
CO2 SERPL-SCNC: 26 MMOL/L (ref 21–32)
CREAT SERPL-MCNC: 0.7 MG/DL (ref 0.5–1.3)
EGFRCR SERPLBLD CKD-EPI 2021: >90 ML/MIN/1.73M*2
EOSINOPHIL # BLD AUTO: 0.91 X10*3/UL (ref 0–0.7)
EOSINOPHIL NFR BLD AUTO: 6.8 %
ERYTHROCYTE [DISTWIDTH] IN BLOOD BY AUTOMATED COUNT: 17.8 % (ref 11.5–14.5)
GLUCOSE SERPL-MCNC: 75 MG/DL (ref 74–99)
HCT VFR BLD AUTO: 29.4 % (ref 41–52)
HGB BLD-MCNC: 9.5 G/DL (ref 13.5–17.5)
HGB RETIC QN: 32 PG (ref 28–38)
IMM GRANULOCYTES # BLD AUTO: 0.07 X10*3/UL (ref 0–0.7)
IMM GRANULOCYTES NFR BLD AUTO: 0.5 % (ref 0–0.9)
IMMATURE RETIC FRACTION: 11.7 %
LDH SERPL L TO P-CCNC: 314 U/L (ref 84–246)
LYMPHOCYTES # BLD AUTO: 2.06 X10*3/UL (ref 1.2–4.8)
LYMPHOCYTES NFR BLD AUTO: 15.3 %
MCH RBC QN AUTO: 28.3 PG (ref 26–34)
MCHC RBC AUTO-ENTMCNC: 32.3 G/DL (ref 32–36)
MCV RBC AUTO: 88 FL (ref 80–100)
MONOCYTES # BLD AUTO: 1.37 X10*3/UL (ref 0.1–1)
MONOCYTES NFR BLD AUTO: 10.2 %
NEUTROPHILS # BLD AUTO: 8.99 X10*3/UL (ref 1.2–7.7)
NEUTROPHILS NFR BLD AUTO: 67 %
NRBC BLD-RTO: 0.8 /100 WBCS (ref 0–0)
PLATELET # BLD AUTO: 440 X10*3/UL (ref 150–450)
POTASSIUM SERPL-SCNC: 3.9 MMOL/L (ref 3.5–5.3)
PROT SERPL-MCNC: 6.4 G/DL (ref 6.4–8.2)
RBC # BLD AUTO: 3.36 X10*6/UL (ref 4.5–5.9)
RETICS #: 0.27 X10*6/UL (ref 0.02–0.12)
RETICS/RBC NFR AUTO: 8 % (ref 0.5–2)
SODIUM SERPL-SCNC: 138 MMOL/L (ref 136–145)
WBC # BLD AUTO: 13.4 X10*3/UL (ref 4.4–11.3)

## 2024-09-28 PROCEDURE — 83615 LACTATE (LD) (LDH) ENZYME: CPT

## 2024-09-28 PROCEDURE — 2500000004 HC RX 250 GENERAL PHARMACY W/ HCPCS (ALT 636 FOR OP/ED)

## 2024-09-28 PROCEDURE — 2500000001 HC RX 250 WO HCPCS SELF ADMINISTERED DRUGS (ALT 637 FOR MEDICARE OP)

## 2024-09-28 PROCEDURE — 85025 COMPLETE CBC W/AUTO DIFF WBC: CPT

## 2024-09-28 PROCEDURE — 36415 COLL VENOUS BLD VENIPUNCTURE: CPT

## 2024-09-28 PROCEDURE — 85045 AUTOMATED RETICULOCYTE COUNT: CPT

## 2024-09-28 PROCEDURE — 80053 COMPREHEN METABOLIC PANEL: CPT

## 2024-09-28 PROCEDURE — 99239 HOSP IP/OBS DSCHRG MGMT >30: CPT

## 2024-09-28 ASSESSMENT — PAIN - FUNCTIONAL ASSESSMENT
PAIN_FUNCTIONAL_ASSESSMENT: 0-10
PAIN_FUNCTIONAL_ASSESSMENT: 0-10

## 2024-09-28 ASSESSMENT — COGNITIVE AND FUNCTIONAL STATUS - GENERAL
DAILY ACTIVITIY SCORE: 24
MOBILITY SCORE: 24

## 2024-09-28 ASSESSMENT — PAIN SCALES - GENERAL
PAINLEVEL_OUTOF10: 4
PAINLEVEL_OUTOF10: 6
PAINLEVEL_OUTOF10: 6
PAINLEVEL_OUTOF10: 7
PAINLEVEL_OUTOF10: 6

## 2024-09-28 NOTE — DISCHARGE SUMMARY
Discharge Diagnosis  Sickle cell crisis (Multi)    Issues Requiring Follow-Up  Sickle cell with priapism and lymphoma     Test Results Pending At Discharge  Pending Labs       Order Current Status    CBC and Auto Differential Collected (09/28/24 0818)    Comprehensive Metabolic Panel Collected (09/28/24 0818)    Lactate Dehydrogenase Collected (09/28/24 0818)    Reticulocytes Collected (09/28/24 0818)    Blood Culture Preliminary result    Blood Culture Preliminary result            Hospital Course  Joellen Narayan is a 23 y.o. 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 CXR (9/12) negative for acute cardiopulmonary process. Urology following patient for priapism, upon presentation, patient did not agree to bedside drainage and corporal blood gas. 9/13 started on Ketoconazole 200mg BID and prednisone 5mg daily per urology recs. Penile doppler US ordered 9/15 per urology, showing normal caliber and peak systolic velocities of b/l cavernosal arteries which have improved since prior. Hematology consulted on 9/13 give persistent priapism x1 month, recommend emergent RBCex. 9/13 plan for apheresis line placement in the ED followed by emergent RBCex, however, patient declined placement of line in ED on 9/13, plan for apheresis line placement with IR followed by RBCex on 9/16. On 9/16 he then again declined apheresis line placement and RBCEx. Pain was stable x2 days until again 9/18 pt endorsed significant groin pain, penis still bendable. Discussed with urology, NTD.  Tbili up to 8.0 (9/18), 11.1 (9/19); RUQ US with cholelithiasis without cholecystitis (9/18). Pt became amenable to RBCEx, s/p apheresis line 9/18 and s/p RBC exchange pt 9/19. On 9/20, lysis labs improving post exchange. Apheresis line removed, EKG, CXR and troponin were unremarkable. Urology re-engaged on 9/20 for continued groin/shaft pain localized around site from recent urology intervention with minimal improvement clinically since RBCex; no interventions needed. 9/22 developed epigastric pain, given recent choledocholithiasis, amylase and lipase sent (negative), troponin and CXR also negative 9/21. S/p Rotating PO oxycodone and IVP dilaudid; (9/23-current) started IV morphine PRN for pain management. Increased leukocytosis noted 9/25, infectious workup started however pt afebrile with no s/sx of infectious process. CXR (9/25) negative for acute process. BCX x2 (9/25) NGTD. UA (9/25) with +leuks, 1+ bacteria; UCX showing multiple organisms likely contaminate. Repeat ordered. Since patient is asymptomatic for UTI will hold off on starting atbs. 9/27, patient complaining of left hip and leg pain. XR pelvis neg. Leukocytosis improving. 9/28 discharged home with Rx for prednisone and Ketoconazole for priapism. Patient to follow up with the sickle cell team 10/9, urology 10/8, and Oncology 10/22.   On the day of discharge, the patient reported feeling well and pain was controlled. Vitals and labs were stable. On exam:  Constitutional: Awake, NAD  ENMT: mucous membranes moist, no apparent injury, no lesions seen  Head/Neck: NCAT  Respiratory/Thorax: CTAB, no wheezing  Cardiovascular: RRR, S1+S2, no murmur  Gastrointestinal: Soft, NT, nondistended, +BS  Extremities: No LE edema  Psychological: Appropriate mood and behavior  Skin: no rash noted  Attending has reviewed all labs and vitals, and discussed and agreed with the discharge plan prior to patient discharge.  Attending has reviewed all labs and vitals, and  discussed and agreed with the discharge plan prior to patient discharge.      Pertinent Physical Exam At Time of Discharge  Physical Exam  HENT:      Head: Normocephalic.      Nose: Nose normal.      Mouth/Throat:      Mouth: Mucous membranes are moist.   Eyes:      Pupils: Pupils are equal, round, and reactive to light.   Cardiovascular:      Rate and Rhythm: Normal rate.   Pulmonary:      Effort: Pulmonary effort is normal.   Abdominal:      Palpations: Abdomen is soft.   Musculoskeletal:         General: Normal range of motion.      Cervical back: Normal range of motion.   Skin:     General: Skin is warm.      Capillary Refill: Capillary refill takes less than 2 seconds.   Neurological:      General: No focal deficit present.      Mental Status: He is alert.   Psychiatric:         Mood and Affect: Mood normal.         Behavior: Behavior normal.         Home Medications     Medication List      START taking these medications     ketoconazole 200 mg tablet; Commonly known as: NIZOral; Take 1 tablet   (200 mg) by mouth 2 times a day.   predniSONE 5 mg tablet; Commonly known as: Deltasone; Take 1 tablet (5   mg) by mouth once daily.     CONTINUE taking these medications     baclofen 5 mg tablet; Commonly known as: Lioresal; Take 1 tablet (5 mg)   by mouth 3 times a day.   flutamide 125 mg capsule; Commonly known as: Eulexin; Take 1 capsule   (125 mg total) by mouth 3 times a day.  Take with a full glass of water.   gabapentin 100 mg capsule; Commonly known as: Neurontin; Take 1 capsule   (100 mg) by mouth every 8 hours.   pantoprazole 20 mg EC tablet; Commonly known as: ProtoNix; Take 1 tablet   (20 mg) by mouth once daily in the morning. Take before meals. Do not   crush, chew, or split.   Sudogest 60 mg tablet; Generic drug: pseudoephedrine; Take 1 tablet (60   mg) by mouth every 8 hours if needed for congestion for up to 10 days.     ASK your doctor about these medications     folic acid 1 mg tablet; Commonly  known as: Folvite; Take 1 tablet (1 mg)   by mouth once daily for 28 days.; Ask about: Should I take this   medication?   oxyCODONE 15 mg immediate release tablet; Commonly known as: Roxicodone;   Take 1 tablet (15 mg) by mouth every 6 hours if needed (Severe sickle cell   pain) for up to 5 days.; Ask about: Should I take this medication?       Outpatient Follow-Up  Future Appointments   Date Time Provider Department [place]   9/30/2024  2:30 PM Elias Sanz MD [id number] [facility]   10/8/2024  8:40 AM Guru Godinez MD [id number] [facility]   10/9/2024  2:30 PM Homa Dickinson APRN-CNP [id number] [facility]   10/22/2024  4:40 PM Melissa Stoll MD [id number] [facility]   11/19/2024  8:00 AM Katherine Jimenez MD [id number] Cara Modi, APRN-CNP

## 2024-09-28 NOTE — CARE PLAN
Problem: Pain  Goal: Takes deep breaths with improved pain control throughout the shift  Outcome: Progressing  Goal: Turns in bed with improved pain control throughout the shift  Outcome: Progressing  Goal: Walks with improved pain control throughout the shift  Outcome: Progressing  Goal: Performs ADL's with improved pain control throughout shift  Outcome: Progressing  Goal: Participates in PT with improved pain control throughout the shift  Outcome: Progressing  Goal: Free from opioid side effects throughout the shift  Outcome: Progressing  Goal: Free from acute confusion related to pain meds throughout the shift  Outcome: Progressing     Problem: Pain - Adult  Goal: Verbalizes/displays adequate comfort level or baseline comfort level  Outcome: Progressing     Problem: Safety - Adult  Goal: Free from fall injury  Outcome: Progressing     Problem: Discharge Planning  Goal: Discharge to home or other facility with appropriate resources  Outcome: Progressing     Problem: Chronic Conditions and Co-morbidities  Goal: Patient's chronic conditions and co-morbidity symptoms are monitored and maintained or improved  Outcome: Progressing     Problem: Fall/Injury  Goal: Not fall by end of shift  Outcome: Progressing  Goal: Be free from injury by end of the shift  Outcome: Progressing  Goal: Verbalize understanding of personal risk factors for fall in the hospital  Outcome: Progressing  Goal: Verbalize understanding of risk factor reduction measures to prevent injury from fall in the home  Outcome: Progressing  Goal: Use assistive devices by end of the shift  Outcome: Progressing  Goal: Pace activities to prevent fatigue by end of the shift  Outcome: Progressing       The clinical goals for the shift include Pt will remain safe and free from injury throughout shift.    Goals Met.    Pt safely discharged. IV removed. All belongings returned. Discharge paperwork given to and gone over with pt.

## 2024-09-29 ENCOUNTER — HOSPITAL ENCOUNTER (EMERGENCY)
Facility: HOSPITAL | Age: 24
Discharge: OTHER INSTITUTION | End: 2024-09-29
Payer: COMMERCIAL

## 2024-09-29 VITALS
DIASTOLIC BLOOD PRESSURE: 77 MMHG | HEIGHT: 74 IN | WEIGHT: 153 LBS | HEART RATE: 89 BPM | TEMPERATURE: 98.8 F | OXYGEN SATURATION: 99 % | BODY MASS INDEX: 19.64 KG/M2 | RESPIRATION RATE: 18 BRPM | SYSTOLIC BLOOD PRESSURE: 138 MMHG

## 2024-09-29 LAB
BACTERIA BLD CULT: NORMAL
BACTERIA BLD CULT: NORMAL

## 2024-09-29 PROCEDURE — 4500999001 HC ED NO CHARGE

## 2024-09-29 ASSESSMENT — PAIN DESCRIPTION - FREQUENCY: FREQUENCY: CONSTANT/CONTINUOUS

## 2024-09-29 ASSESSMENT — PAIN SCALES - GENERAL: PAINLEVEL_OUTOF10: 10 - WORST POSSIBLE PAIN

## 2024-09-29 ASSESSMENT — PAIN DESCRIPTION - DESCRIPTORS: DESCRIPTORS: ACHING

## 2024-09-29 ASSESSMENT — PAIN DESCRIPTION - PROGRESSION: CLINICAL_PROGRESSION: NOT CHANGED

## 2024-09-29 ASSESSMENT — PAIN - FUNCTIONAL ASSESSMENT: PAIN_FUNCTIONAL_ASSESSMENT: 0-10

## 2024-09-29 ASSESSMENT — PAIN DESCRIPTION - ONSET: ONSET: ONGOING

## 2024-09-29 ASSESSMENT — PAIN DESCRIPTION - LOCATION: LOCATION: CHEST

## 2024-09-29 ASSESSMENT — PAIN DESCRIPTION - PAIN TYPE: TYPE: ACUTE PAIN

## 2024-09-30 ENCOUNTER — HOSPITAL ENCOUNTER (OUTPATIENT)
Dept: RADIOLOGY | Facility: HOSPITAL | Age: 24
Discharge: HOME | End: 2024-09-30
Payer: COMMERCIAL

## 2024-09-30 ENCOUNTER — OFFICE VISIT (OUTPATIENT)
Dept: HEMATOLOGY/ONCOLOGY | Facility: HOSPITAL | Age: 24
End: 2024-09-30
Payer: COMMERCIAL

## 2024-09-30 ENCOUNTER — APPOINTMENT (OUTPATIENT)
Dept: SURGERY | Facility: CLINIC | Age: 24
End: 2024-09-30
Payer: COMMERCIAL

## 2024-09-30 ENCOUNTER — PHARMACY VISIT (OUTPATIENT)
Dept: PHARMACY | Facility: CLINIC | Age: 24
End: 2024-09-30
Payer: MEDICARE

## 2024-09-30 VITALS
OXYGEN SATURATION: 100 % | DIASTOLIC BLOOD PRESSURE: 67 MMHG | HEART RATE: 84 BPM | SYSTOLIC BLOOD PRESSURE: 122 MMHG | RESPIRATION RATE: 18 BRPM | TEMPERATURE: 99.5 F | WEIGHT: 149.03 LBS | BODY MASS INDEX: 19.13 KG/M2

## 2024-09-30 DIAGNOSIS — R52 ACUTE PAIN: ICD-10-CM

## 2024-09-30 DIAGNOSIS — K59.03 DRUG-INDUCED CONSTIPATION: ICD-10-CM

## 2024-09-30 DIAGNOSIS — R52 ACUTE PAIN: Primary | ICD-10-CM

## 2024-09-30 LAB
ALBUMIN SERPL BCP-MCNC: 4.4 G/DL (ref 3.4–5)
ALP SERPL-CCNC: 134 U/L (ref 33–120)
ALT SERPL W P-5'-P-CCNC: 48 U/L (ref 10–52)
ANION GAP SERPL CALC-SCNC: 13 MMOL/L (ref 10–20)
AST SERPL W P-5'-P-CCNC: 41 U/L (ref 9–39)
BASOPHILS # BLD AUTO: 0.04 X10*3/UL (ref 0–0.1)
BASOPHILS NFR BLD AUTO: 0.2 %
BILIRUB SERPL-MCNC: 4.6 MG/DL (ref 0–1.2)
BUN SERPL-MCNC: 8 MG/DL (ref 6–23)
CALCIUM SERPL-MCNC: 9.3 MG/DL (ref 8.6–10.3)
CHLORIDE SERPL-SCNC: 105 MMOL/L (ref 98–107)
CO2 SERPL-SCNC: 25 MMOL/L (ref 21–32)
CREAT SERPL-MCNC: 0.52 MG/DL (ref 0.5–1.3)
EGFRCR SERPLBLD CKD-EPI 2021: >90 ML/MIN/1.73M*2
EOSINOPHIL # BLD AUTO: 0.8 X10*3/UL (ref 0–0.7)
EOSINOPHIL NFR BLD AUTO: 4.8 %
ERYTHROCYTE [DISTWIDTH] IN BLOOD BY AUTOMATED COUNT: 17.7 % (ref 11.5–14.5)
GLUCOSE SERPL-MCNC: 83 MG/DL (ref 74–99)
HCT VFR BLD AUTO: 29.1 % (ref 41–52)
HGB BLD-MCNC: 9.7 G/DL (ref 13.5–17.5)
HGB RETIC QN: 31 PG (ref 28–38)
IMM GRANULOCYTES # BLD AUTO: 0.06 X10*3/UL (ref 0–0.7)
IMM GRANULOCYTES NFR BLD AUTO: 0.4 % (ref 0–0.9)
IMMATURE RETIC FRACTION: 14 %
LDH SERPL L TO P-CCNC: 255 U/L (ref 84–246)
LYMPHOCYTES # BLD AUTO: 1.32 X10*3/UL (ref 1.2–4.8)
LYMPHOCYTES NFR BLD AUTO: 7.9 %
MCH RBC QN AUTO: 28.5 PG (ref 26–34)
MCHC RBC AUTO-ENTMCNC: 33.3 G/DL (ref 32–36)
MCV RBC AUTO: 86 FL (ref 80–100)
MONOCYTES # BLD AUTO: 0.94 X10*3/UL (ref 0.1–1)
MONOCYTES NFR BLD AUTO: 5.6 %
NEUTROPHILS # BLD AUTO: 13.48 X10*3/UL (ref 1.2–7.7)
NEUTROPHILS NFR BLD AUTO: 81.1 %
NRBC BLD-RTO: 0.4 /100 WBCS (ref 0–0)
PLATELET # BLD AUTO: 450 X10*3/UL (ref 150–450)
POTASSIUM SERPL-SCNC: 3.8 MMOL/L (ref 3.5–5.3)
PROT SERPL-MCNC: 6.8 G/DL (ref 6.4–8.2)
RBC # BLD AUTO: 3.4 X10*6/UL (ref 4.5–5.9)
RETICS #: 0.25 X10*6/UL (ref 0.02–0.12)
RETICS/RBC NFR AUTO: 7.4 % (ref 0.5–2)
SODIUM SERPL-SCNC: 139 MMOL/L (ref 136–145)
WBC # BLD AUTO: 16.6 X10*3/UL (ref 4.4–11.3)

## 2024-09-30 PROCEDURE — 2500000001 HC RX 250 WO HCPCS SELF ADMINISTERED DRUGS (ALT 637 FOR MEDICARE OP)

## 2024-09-30 PROCEDURE — 74176 CT ABD & PELVIS W/O CONTRAST: CPT

## 2024-09-30 PROCEDURE — 83615 LACTATE (LD) (LDH) ENZYME: CPT

## 2024-09-30 PROCEDURE — 4004F PT TOBACCO SCREEN RCVD TLK: CPT

## 2024-09-30 PROCEDURE — 85025 COMPLETE CBC W/AUTO DIFF WBC: CPT

## 2024-09-30 PROCEDURE — 99215 OFFICE O/P EST HI 40 MIN: CPT

## 2024-09-30 PROCEDURE — 99417 PROLNG OP E/M EACH 15 MIN: CPT

## 2024-09-30 PROCEDURE — RXMED WILLOW AMBULATORY MEDICATION CHARGE

## 2024-09-30 PROCEDURE — 85045 AUTOMATED RETICULOCYTE COUNT: CPT

## 2024-09-30 PROCEDURE — 84075 ASSAY ALKALINE PHOSPHATASE: CPT

## 2024-09-30 PROCEDURE — 2500000005 HC RX 250 GENERAL PHARMACY W/O HCPCS

## 2024-09-30 PROCEDURE — 2500000004 HC RX 250 GENERAL PHARMACY W/ HCPCS (ALT 636 FOR OP/ED)

## 2024-09-30 RX ORDER — NALOXONE HYDROCHLORIDE 0.4 MG/ML
0.4 INJECTION, SOLUTION INTRAMUSCULAR; INTRAVENOUS; SUBCUTANEOUS
Status: DISCONTINUED | OUTPATIENT
Start: 2024-09-30 | End: 2024-09-30 | Stop reason: HOSPADM

## 2024-09-30 RX ORDER — OXYCODONE HYDROCHLORIDE 5 MG/1
10 TABLET ORAL ONCE
Status: COMPLETED | OUTPATIENT
Start: 2024-09-30 | End: 2024-09-30

## 2024-09-30 RX ORDER — DIPHENHYDRAMINE HCL 25 MG
25 CAPSULE ORAL ONCE
Status: COMPLETED | OUTPATIENT
Start: 2024-09-30 | End: 2024-09-30

## 2024-09-30 RX ORDER — OXYCODONE HYDROCHLORIDE 5 MG/1
15 TABLET ORAL ONCE
Status: COMPLETED | OUTPATIENT
Start: 2024-09-30 | End: 2024-09-30

## 2024-09-30 RX ORDER — CYCLOBENZAPRINE HCL 10 MG
10 TABLET ORAL ONCE
Status: COMPLETED | OUTPATIENT
Start: 2024-09-30 | End: 2024-09-30

## 2024-09-30 RX ORDER — POLYETHYLENE GLYCOL 3350 17 G/17G
17 POWDER, FOR SOLUTION ORAL DAILY
Qty: 7 PACKET | Refills: 0 | Status: SHIPPED | OUTPATIENT
Start: 2024-09-30 | End: 2024-10-07

## 2024-09-30 RX ORDER — ONDANSETRON 4 MG/1
4 TABLET, FILM COATED ORAL ONCE
Status: COMPLETED | OUTPATIENT
Start: 2024-09-30 | End: 2024-09-30

## 2024-09-30 RX ORDER — SENNOSIDES 8.6 MG/1
1 TABLET ORAL 2 TIMES DAILY
Qty: 60 TABLET | Refills: 0 | Status: SHIPPED | OUTPATIENT
Start: 2024-09-30 | End: 2024-10-30

## 2024-09-30 RX ORDER — OXYCODONE HYDROCHLORIDE 20 MG/1
20 TABLET ORAL EVERY 6 HOURS PRN
Qty: 20 TABLET | Refills: 0 | Status: SHIPPED | OUTPATIENT
Start: 2024-09-30

## 2024-09-30 ASSESSMENT — PAIN SCALES - GENERAL
PAINLEVEL: 10-WORST PAIN EVER
PAINLEVEL_OUTOF10: 8
PAINLEVEL_OUTOF10: 8
PAINLEVEL_OUTOF10: 7

## 2024-09-30 ASSESSMENT — PAIN DESCRIPTION - LOCATION
LOCATION: CHEST
LOCATION: ABDOMEN

## 2024-09-30 ASSESSMENT — ENCOUNTER SYMPTOMS
APPETITE CHANGE: 1
FATIGUE: 1
BACK PAIN: 1
ABDOMINAL PAIN: 1
CONSTIPATION: 1
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
## 2024-10-02 ENCOUNTER — APPOINTMENT (OUTPATIENT)
Dept: SURGERY | Facility: CLINIC | Age: 24
End: 2024-10-02
Payer: COMMERCIAL

## 2024-10-02 ENCOUNTER — HOSPITAL ENCOUNTER (OUTPATIENT)
Facility: HOSPITAL | Age: 24
Setting detail: OUTPATIENT SURGERY
End: 2024-10-02
Attending: SURGERY | Admitting: SURGERY
Payer: COMMERCIAL

## 2024-10-02 DIAGNOSIS — K80.50 CHOLEDOCHOLITHIASIS: ICD-10-CM

## 2024-10-02 NOTE — PROGRESS NOTES
"Joellen Narayan  [id number]   10/02/24  9:05 AM    HPI/Subjective:  Joellen Narayan is a 23 y.o. male who is referred to clinic by No ref. provider found *** for evaluation of {DESC; SYMPTOMATIC/ASYMPTOMATIC:87585::\"symptomatic\"} gallstones.    Symptomatically, this patient experiences {TERRY Cholecystitis Pain:33497} {frequency:23933}, which is {COURSE IMPROVING/WORSENING SX HPI:78662} - first noticed *** and now ***.    They have had {SEVERAL/MANY/NO:98311::\"no\"} presentation(s) to the hospital for these symptoms***.    Workup has included labs notable for *** WBC, *** bilirubin, *** alk phos, and *** lipase, and {GI RADIOLOGY:53456}    Past surgical history is otherwise notable for ***.     At this point, they would like {Surgical Options:19188}.    From a pain history standpoint,  Behavioral health history - {sjzbehavhealth:52959::\"None\"}  Opioid substance use - {sjzopioidhx:97540::\"None\"}    From a functional/activity standpoint,  Ability to perform ADLs in 30 days prior to surgery - {sjzfunctionalstatus:04825::\"Independent\"}  Employment - {sjzemployment:26523}  Sporting Activity - {sjzsportingactivity:59602}  BMI - There is no height or weight on file to calculate BMI.    Past medical history is significant for:    Hepatic insufficiency or liver failure - {yes,no:21211::\"No\"}  Ascites - {yes,no:21211::\"No\"}  Hypertension - {yes,no:21211::\"No\"}  Diabetes mellitus - {yes,no:21211::\"No\"}   Dialysis (acute or chronic renal failure requiring dialysis within 2 weeks prior to surgery) - {dialysis types:64651::\"None\"}  COPD - {yes,no:21211::\"No\"}  Dyspnea - {yes,no:21211::\"No\"}  Antiplatelet medications excluding aspirin - {YES wildcard/NO:60::\"No\"}  Anticoagulation medications - {YES wildcard/NO:60::\"No\"}  Aspirin - {yes,no:21211::\"No\"}  Immunosuppression - {YES wildcard/NO:60::\"No\"}  Nicotine use in relation to OR date - {yes,no:21211::\"No\"}  History of AAA - {yes,no:21211::\"No\"}  Collagen vascular disorder - {YES " "wildcard/NO:60::\"No\"}  Congestive heart failure - {yes,no:21211::\"No\"}  Active pregnancy - {yes,no:21211::\"No\"}    Review of Systems     Current Outpatient Medications   Medication Instructions   • baclofen (LIORESAL) 5 mg, oral, 3 times daily   • flutamide (EULEXIN) 125 mg, oral, 3 times daily, Take with a full glass of water.   • gabapentin (NEURONTIN) 100 mg, oral, Every 8 hours scheduled   • ketoconazole (NIZORAL) 200 mg, oral, 2 times daily   • oxyCODONE (ROXICODONE) 20 mg, oral, Every 6 hours PRN   • pantoprazole (PROTONIX) 20 mg, oral, Daily before breakfast, Do not crush, chew, or split.   • polyethylene glycol (Glycolax, Miralax) 17 gram packet Take 17 g (1 packet) dissolved in liquid by mouth once daily for 7 days.   • predniSONE (DELTASONE) 5 mg, oral, Daily   • sennosides (SENOKOT) 8.6 mg, oral, 2 times daily   • Sudogest 60 mg, oral, Every 8 hours PRN       Allergies   Allergen Reactions   • Cefepime Hallucinations   • Amoxicillin Hives   • Ceftriaxone Hives and Rash       Objective:  There were no vitals filed for this visit.  There is no height or weight on file to calculate BMI.  Physical Exam    Imaging consisting of *** was reviewed personally and was notable for {findings:90654::\"gallstones\"}.    Assessment/Plan:  Joellen Narayan is a 23 y.o. male with history of *** who presents with symptoms and workup consistent with {diagnosis:64180::\"bilary dyskinesia\"}.    We will plan to {sjznextsteps:11334}. Plan will be for laparoscopic, possible open cholecystectomy with cholangiogram. Discussed with patient the risks (general risks including bleeding, infection, injury to other intra-abdominal organs, and general anesthesia among others, as well as procedure specific risks including bile leak, retained stones, or biliary injury), benefits, and alternatives and surgical consent was obtained in the office.    I will see the patient back in the office in {sjzreturnperiod:00002} or sooner if " needed.    Portions of medical record reviewed for pertinent issues including active problem list, medication list, allergies, social history, health maintenance, notes from previous encounters, lab results, and imaging.     Ian Marsh MD  Assistant Professor of Surgery  Division of General Surgery  Department of Surgery

## 2024-10-07 ENCOUNTER — APPOINTMENT (OUTPATIENT)
Dept: UROLOGY | Facility: CLINIC | Age: 24
End: 2024-10-07
Payer: COMMERCIAL

## 2024-10-08 ENCOUNTER — APPOINTMENT (OUTPATIENT)
Dept: UROLOGY | Facility: CLINIC | Age: 24
End: 2024-10-08
Payer: COMMERCIAL

## 2024-10-15 DIAGNOSIS — R52 ACUTE PAIN: ICD-10-CM

## 2024-10-15 DIAGNOSIS — D57.09 PRIAPISM DUE TO SICKLE CELL DISEASE (MULTI): ICD-10-CM

## 2024-10-15 DIAGNOSIS — N48.32 PRIAPISM DUE TO SICKLE CELL DISEASE (MULTI): ICD-10-CM

## 2024-10-15 RX ORDER — PREDNISONE 5 MG/1
5 TABLET ORAL DAILY
Qty: 30 TABLET | Refills: 0 | Status: CANCELLED | OUTPATIENT
Start: 2024-10-15 | End: 2024-11-14

## 2024-10-15 RX ORDER — OXYCODONE HYDROCHLORIDE 20 MG/1
20 TABLET ORAL EVERY 6 HOURS PRN
Qty: 20 TABLET | Refills: 0 | Status: CANCELLED | OUTPATIENT
Start: 2024-10-15

## 2024-10-17 ENCOUNTER — TELEPHONE (OUTPATIENT)
Dept: HEMATOLOGY/ONCOLOGY | Facility: CLINIC | Age: 24
End: 2024-10-17
Payer: COMMERCIAL

## 2024-10-17 NOTE — TELEPHONE ENCOUNTER
Called patient. I gave him an updated fax number for our office.  I told him depending on when his mom faxed it, it sometimes takes a day or so to get to us.  I told him to bring the papers she is trying to fax to his appointment next week also.  He is agreeable. No further questions.

## 2024-10-18 ENCOUNTER — SOCIAL WORK (OUTPATIENT)
Dept: HEMATOLOGY/ONCOLOGY | Facility: HOSPITAL | Age: 24
End: 2024-10-18
Payer: COMMERCIAL

## 2024-10-18 ENCOUNTER — TELEPHONE (OUTPATIENT)
Dept: ADMISSION | Facility: HOSPITAL | Age: 24
End: 2024-10-18
Payer: COMMERCIAL

## 2024-10-18 NOTE — TELEPHONE ENCOUNTER
Chadd Metz made a phone note this has been done and secured chat me that patient has been notified. Nothing further needed from team.

## 2024-10-18 NOTE — PROGRESS NOTES
Medical Certificate for Medical Necessity    Date Received: 10/18/24  Utility Company: [employer] (P:[phone]/F: [phone])  Completed by: Dr. Jerman CASTILLO  Submitted via Fax: on 10/18/24  Resources Offered: NA  Additional Information: Recorded a verbal request with [employer] representative. Will submit paper med cert when signed via fax.    ANNA will continue to follow as needed.     UPDATE 10/18/24 @ 1400:  Completed Med Cert signed by Dr. Stoll was faxed to [employer] on this day.

## 2024-10-18 NOTE — TELEPHONE ENCOUNTER
Discussed with pt's mother that RN partner is off today and unable to verify that form was received.  She was transferred to clinical  to confirm receipt of fax.
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
## 2024-10-18 NOTE — TELEPHONE ENCOUNTER
I called Melissa back since she left V/M on nurse line and it was after our call we had 15 minutes ago that if she needed something different then what we spoke about to call nurse line back if she had further questions.

## 2024-10-18 NOTE — TELEPHONE ENCOUNTER
Pt's mother called to check on the status of a medical certificate from the [employer].  It has been faxed x2.

## 2024-10-18 NOTE — TELEPHONE ENCOUNTER
Received a secure chat Chadd Metz did speak with Melissa and having her send the fax directly to her and then will get a MD to sign the certificate.

## 2024-10-22 ENCOUNTER — OFFICE VISIT (OUTPATIENT)
Dept: HEMATOLOGY/ONCOLOGY | Facility: HOSPITAL | Age: 24
End: 2024-10-22
Payer: COMMERCIAL

## 2024-10-22 VITALS
TEMPERATURE: 99.7 F | DIASTOLIC BLOOD PRESSURE: 74 MMHG | WEIGHT: 154.76 LBS | OXYGEN SATURATION: 97 % | RESPIRATION RATE: 17 BRPM | BODY MASS INDEX: 19.86 KG/M2 | HEART RATE: 80 BPM | SYSTOLIC BLOOD PRESSURE: 129 MMHG

## 2024-10-22 DIAGNOSIS — C81.03 NODULAR LYMPHOCYTE PREDOMINANT HODGKIN LYMPHOMA OF INTRA-ABDOMINAL LYMPH NODES (MULTI): ICD-10-CM

## 2024-10-22 PROCEDURE — 99215 OFFICE O/P EST HI 40 MIN: CPT | Performed by: INTERNAL MEDICINE

## 2024-10-22 PROCEDURE — 4004F PT TOBACCO SCREEN RCVD TLK: CPT | Performed by: INTERNAL MEDICINE

## 2024-10-22 ASSESSMENT — PAIN SCALES - GENERAL: PAINLEVEL_OUTOF10: 6

## 2024-10-22 NOTE — LETTER
October 22, 2024     Patient: Joellen Narayan   YOB: 2000   Date of Visit: 10/22/2024       To Whom It May Concern:    Joellen Narayan was seen in my clinic on 10/22/2024 at 4:40 pm. Please excuse Joellen for his absence from work on this day to make the appointment.  He is able to return to work with no restrictions.     If you have any questions or concerns, please don't hesitate to call [phone]         Sincerely,         Melissa Stoll MD

## 2024-10-22 NOTE — PROGRESS NOTES
prednisonePatient ID: Joellen Narayan is a 24 y.o. male.  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 He came with his monther.  12/16/23: He was found to have retroperitoneal LN's and recent PET showed slight increase RPLN's in size, Interval development on multiple FDG avid focus within sternum, vertebral body, R acetabular wall and L femoral head. MRI C/T/L spine (12/20) slight decreased marrow signal throughout the spine consistent with chronic anemia in sickle cell disease. MRI Pelvis (12/20) T2 and FLAIR hyperintense signal of the R superior endplate of the L5 vertebral body, R superior acetabulum, and L femoral head corresponding to hypermetabolic lesions seen on PET that may represent osseous lymphomatous involvement. He underwent paraaortic LN Bx per IR on 11/30/23 and path was c/w NLPHL(grade II) He denies recent night sweats, weight loss, n/v/d or abd pain. He also denies bowel/urinary problems.   1/18/24: here for chemotherapy. Since last visit, he was seen 2 x at ED with sickle cell crisis. Doing ok since.   2/8/24: here for cycle #2. Feels well today. Labs reviewed  3/7/24: missed C#3 last week due to sickle cell crisis. Feels ok today.  4/4/24: here for cycle #4. Doing ok. No new c/o  5/16/24: he missed his appointment and his phone was disconnected. Was able to talk to hi mom and now he is here for cycle #5 Rituxan/prednisone. No other c/o  7/25/24: completed his chemo 6/7/24. Recently hospitalized with SS crisis 7/8 s/p ERCP with sphincterotomy for cholodocholithiasis, s/p 2U PRBC per Dr. Cruz. Plan for outpatient CCX  when bili at his baseline. Doing well. Wants to go back to work, had PET this AM.  10/22/24: here for routine follow up. Recently hospitalized for cholecystitis. Ccx scheduled in 10 days. No new c/o        Review of Systems   All other systems reviewed and are negative.       Objective   BSA: 1.91 meters squared  /74 (BP Location: Right leg, Patient Position: Sitting, BP Cuff Size: Adult)   Pulse 80   Temp 37.6 °C (99.7 °F) (Skin)   Resp 17   Wt 70.2 kg (154 lb 12.2 oz)   SpO2 97%   BMI 19.86 kg/m²     Family History   Problem Relation Name Age of Onset    Sickle cell trait Mother      Sickle cell trait Father      Lung cancer Brother       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 (CMS/HCC)     1/18/2024 - 6/7/2024 Chemotherapy    R-CHOP (Cyclophosphamide / DOXOrubicin / VinCRIStine / PredniSONE) + RiTUXimab, 21 Day Cycles         Joellen Narayan  reports that he has been smoking cigars. He has been exposed to tobacco smoke. He has never used smokeless tobacco.  He  reports no history of alcohol use.  He  reports current drug use. Drug: Marijuana.    Physical Exam  HENT:      Head: Normocephalic.   Eyes:      Pupils: Pupils are equal, round, and reactive to light.      Comments: jaundice   Cardiovascular:      Rate and Rhythm: Normal rate.      Pulses: Normal pulses.      Comments: 2/6 AMADEO  Pulmonary:      Effort: Pulmonary effort is normal.      Breath sounds: Normal breath sounds.   Abdominal:      General: Abdomen is flat.      Palpations: Abdomen is soft.   Musculoskeletal:         General: Normal range of motion.      Cervical back: Normal range of motion and neck supple.   Neurological:      General: No focal deficit present.      Mental Status: He is alert.     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 He came with his monther.    NLPHL  - echocardiogram reviewed with patient  - elevated LDH/bili partially from sickle cell disease  - chemotherapy was discussed(R-CHOP).-> we decided to simplify his chemotherapy to Rituxan and prdnisone q 3 weeks mainly due to frequent sickle cell crisis  -tolerating chemo well.  - recent CT while hospitalized showed significant improvement LN's on 2/16/24  - s/p 6 cycle of R/prednisone  - CT/PET done this AM and result pending  - RTC 2 months or earlier if needed    Sickle cell anemia    Gall stones  - plan for CCx    Hypoxia   On home O2 but not compliant  - encouraged him to use-> if not he will have more frequent SS crisis     RTC after CT/PET      Melissa Stoll MD

## 2024-10-23 ENCOUNTER — APPOINTMENT (OUTPATIENT)
Dept: SURGERY | Facility: CLINIC | Age: 24
End: 2024-10-23
Payer: COMMERCIAL

## 2024-10-23 RX ORDER — PSEUDOEPHEDRINE HYDROCHLORIDE 60 MG/1
60 TABLET ORAL EVERY 8 HOURS PRN
Qty: 30 TABLET | Refills: 0 | Status: SHIPPED | OUTPATIENT
Start: 2024-10-23 | End: 2024-11-02

## 2024-11-02 ASSESSMENT — ENCOUNTER SYMPTOMS
FATIGUE: 1
LIGHT-HEADEDNESS: 0
LEG SWELLING: 0
BRUISES/BLEEDS EASILY: 0
FREQUENCY: 0
HEMATURIA: 0
VOMITING: 0
ABDOMINAL PAIN: 0
HEADACHES: 0
DIZZINESS: 0
CONSTIPATION: 0
DEPRESSION: 0
ARTHRALGIAS: 1
EYE PROBLEMS: 0
HEMOPTYSIS: 0
DIAPHORESIS: 0
COUGH: 0
MYALGIAS: 0
DYSURIA: 0
PALPITATIONS: 0
EXTREMITY WEAKNESS: 0
SORE THROAT: 0
WHEEZING: 0
DIARRHEA: 0
SHORTNESS OF BREATH: 1
BLOOD IN STOOL: 0
WOUND: 0
FEVER: 0
CHEST TIGHTNESS: 0
NAUSEA: 0
BACK PAIN: 1
SCLERAL ICTERUS: 1
NERVOUS/ANXIOUS: 0

## 2024-11-18 ENCOUNTER — HOSPITAL ENCOUNTER (OUTPATIENT)
Dept: RADIOLOGY | Facility: HOSPITAL | Age: 24
Discharge: HOME | End: 2024-11-18
Payer: COMMERCIAL

## 2024-11-18 DIAGNOSIS — C81.03 NODULAR LYMPHOCYTE PREDOMINANT HODGKIN LYMPHOMA OF INTRA-ABDOMINAL LYMPH NODES (MULTI): ICD-10-CM

## 2024-11-18 LAB — GLUCOSE BLD MANUAL STRIP-MCNC: 78 MG/DL (ref 74–99)

## 2024-11-18 PROCEDURE — 78815 PET IMAGE W/CT SKULL-THIGH: CPT | Mod: PS

## 2024-11-18 PROCEDURE — 78815 PET IMAGE W/CT SKULL-THIGH: CPT | Mod: PET TUMOR SUBSQ TX STRATEGY | Performed by: RADIOLOGY

## 2024-11-18 PROCEDURE — 3430000001 HC RX 343 DIAGNOSTIC RADIOPHARMACEUTICALS: Performed by: INTERNAL MEDICINE

## 2024-11-18 PROCEDURE — A9552 F18 FDG: HCPCS | Performed by: INTERNAL MEDICINE

## 2024-11-18 PROCEDURE — 82947 ASSAY GLUCOSE BLOOD QUANT: CPT

## 2024-11-18 RX ORDER — FLUDEOXYGLUCOSE F 18 200 MCI/ML
13 INJECTION, SOLUTION INTRAVENOUS
Status: COMPLETED | OUTPATIENT
Start: 2024-11-18 | End: 2024-11-18

## 2024-11-19 ENCOUNTER — OFFICE VISIT (OUTPATIENT)
Dept: OTOLARYNGOLOGY | Facility: HOSPITAL | Age: 24
End: 2024-11-19
Payer: COMMERCIAL

## 2024-11-19 VITALS — WEIGHT: 163.1 LBS | HEIGHT: 74 IN | TEMPERATURE: 97.5 F | BODY MASS INDEX: 20.93 KG/M2

## 2024-11-19 DIAGNOSIS — J33.8 POLYP OF NASAL SINUS: ICD-10-CM

## 2024-11-19 PROCEDURE — 1036F TOBACCO NON-USER: CPT | Performed by: STUDENT IN AN ORGANIZED HEALTH CARE EDUCATION/TRAINING PROGRAM

## 2024-11-19 PROCEDURE — 99203 OFFICE O/P NEW LOW 30 MIN: CPT | Performed by: STUDENT IN AN ORGANIZED HEALTH CARE EDUCATION/TRAINING PROGRAM

## 2024-11-19 PROCEDURE — 3008F BODY MASS INDEX DOCD: CPT | Performed by: STUDENT IN AN ORGANIZED HEALTH CARE EDUCATION/TRAINING PROGRAM

## 2024-11-19 PROCEDURE — 99213 OFFICE O/P EST LOW 20 MIN: CPT | Performed by: STUDENT IN AN ORGANIZED HEALTH CARE EDUCATION/TRAINING PROGRAM

## 2024-11-19 RX ORDER — FOLIC ACID 1 MG/1
TABLET ORAL DAILY
COMMUNITY

## 2024-11-19 ASSESSMENT — PAIN SCALES - GENERAL: PAINLEVEL_OUTOF10: 0-NO PAIN
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
## 2024-11-21 ENCOUNTER — OFFICE VISIT (OUTPATIENT)
Dept: HEMATOLOGY/ONCOLOGY | Facility: HOSPITAL | Age: 24
End: 2024-11-21
Payer: COMMERCIAL

## 2024-11-21 VITALS
DIASTOLIC BLOOD PRESSURE: 70 MMHG | HEART RATE: 77 BPM | SYSTOLIC BLOOD PRESSURE: 143 MMHG | BODY MASS INDEX: 20.93 KG/M2 | WEIGHT: 163 LBS | TEMPERATURE: 98.6 F | OXYGEN SATURATION: 90 %

## 2024-11-21 DIAGNOSIS — C81.03 NODULAR LYMPHOCYTE PREDOMINANT HODGKIN LYMPHOMA OF INTRA-ABDOMINAL LYMPH NODES (MULTI): Primary | ICD-10-CM

## 2024-11-21 DIAGNOSIS — K80.50 CHOLEDOCHOLITHIASIS: ICD-10-CM

## 2024-11-21 DIAGNOSIS — K81.2 ACUTE ON CHRONIC CHOLECYSTITIS: ICD-10-CM

## 2024-11-21 PROCEDURE — 99215 OFFICE O/P EST HI 40 MIN: CPT | Performed by: INTERNAL MEDICINE

## 2024-11-21 NOTE — PROGRESS NOTES
prednisonePatient ID: Joellen Narayan is a 24 y.o. male.  Referring Physician: No referring provider defined for this encounter.  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 He came with his monther.  12/16/23: He was found to have retroperitoneal LN's and recent PET showed slight increase RPLN's in size, Interval development on multiple FDG avid focus within sternum, vertebral body, R acetabular wall and L femoral head. MRI C/T/L spine (12/20) slight decreased marrow signal throughout the spine consistent with chronic anemia in sickle cell disease. MRI Pelvis (12/20) T2 and FLAIR hyperintense signal of the R superior endplate of the L5 vertebral body, R superior acetabulum, and L femoral head corresponding to hypermetabolic lesions seen on PET that may represent osseous lymphomatous involvement. He underwent paraaortic LN Bx per IR on 11/30/23 and path was c/w NLPHL(grade II) He denies recent night sweats, weight loss, n/v/d or abd pain. He also denies bowel/urinary problems.   1/18/24: here for chemotherapy. Since last visit, he was seen 2 x at ED with sickle cell crisis. Doing ok since.   7/25/24: completed his chemo 6/7/24. Recently hospitalized with SS crisis 7/8 s/p ERCP with sphincterotomy for cholodocholithiasis, s/p 2U PRBC per Dr. Cruz. Plan for outpatient CCX when bili at his baseline.   10/22/24: here for routine follow up. Recently hospitalized for cholecystitis. Ccx scheduled in 10 days. No new c/o  11/21/24: had PET scan. No new c/o        Review of Systems   All other systems reviewed and are negative.       Objective   BSA: 1.96 meters squared  /70 (BP Location: Left arm, Patient Position:  Sitting, BP Cuff Size: Adult)   Pulse 77   Temp 37 °C (98.6 °F) (Temporal)   Wt 73.9 kg (163 lb)   SpO2 90%   BMI 20.93 kg/m²     Family History   Problem Relation Name Age of Onset    Sickle cell trait Mother      Sickle cell trait Father      Lung cancer Brother       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 (CMS/HCC)     1/18/2024 - 6/7/2024 Chemotherapy    R-CHOP (Cyclophosphamide / DOXOrubicin / VinCRIStine / PredniSONE) + RiTUXimab, 21 Day Cycles         Joellen Narayan  reports that he quit smoking about 10 months ago. His smoking use included cigars. He has been exposed to tobacco smoke. He has never used smokeless tobacco.  He  reports no history of alcohol use.  He  reports current drug use. Drug: Marijuana.    Physical Exam  HENT:      Head: Normocephalic.   Eyes:      Pupils: Pupils are equal, round, and reactive to light.      Comments: jaundice   Cardiovascular:      Rate and Rhythm: Normal rate.      Pulses: Normal pulses.      Comments: 2/6 AMADEO  Pulmonary:      Effort: Pulmonary effort is normal.      Breath sounds: Normal breath sounds.   Abdominal:      General: Abdomen is flat.      Palpations: Abdomen is soft.   Musculoskeletal:         General: Normal range of motion.      Cervical back: Normal range of motion and neck supple.   Neurological:      General: No focal deficit present.      Mental Status: He is alert.   === 11/18/24 ===    NM PET CT LYMPHOMA STAGING    - Impression -  1.  Interval increased metabolic activity within upper abdominal and  bilateral inguinal lymph nodes compatible with interval worsening of  lymphomatous disease. (Deauville 5).  2. No evidence of hypermetabolic extranodal disease.  3. Moderate hypermetabolic activity within the tonsils is nonspecific  and may be reactive in nature.  4.  Increased diffuse mild metabolic activity within the parotid and  mandibular glands. Correlate with concern for treatment related  sialadenitis.      Performance Status:  Asymptomatic    Assessment/Plan      NLPHL  - echocardiogram reviewed with patient  - elevated LDH/bili partially from sickle cell disease  - chemotherapy was discussed(R-CHOP).-> we decided to simplify his chemotherapy to Rituxan and prdnisone q 3 weeks mainly due to frequent sickle cell crisis  -tolerated chemo well.  - recent CT while hospitalized showed significant improvement LN's on 2/16/24  - s/p 6 cycle of R/prednisone(1/18-6/7/24)  - CT/PET as above, c/w disease recurrence-. Remains relatively asymptomatic  - discussed with patient and his mother re; treatment options vs, observation at this time.   - repeat CT/PET in 3 months    Sickle cell anemia    Gall stones  - plan for CCx    Hypoxia   On home O2 but not compliant  - encouraged him to use-> if not he will have more frequent SS crisis     RTC after CT/PET      Melissa Stoll MD

## 2024-11-27 ENCOUNTER — LAB (OUTPATIENT)
Dept: LAB | Facility: HOSPITAL | Age: 24
End: 2024-11-27
Payer: COMMERCIAL

## 2024-11-27 ENCOUNTER — OFFICE VISIT (OUTPATIENT)
Dept: HEMATOLOGY/ONCOLOGY | Facility: HOSPITAL | Age: 24
End: 2024-11-27
Payer: COMMERCIAL

## 2024-11-27 VITALS
OXYGEN SATURATION: 90 % | BODY MASS INDEX: 21.53 KG/M2 | WEIGHT: 167.7 LBS | DIASTOLIC BLOOD PRESSURE: 68 MMHG | TEMPERATURE: 99.7 F | SYSTOLIC BLOOD PRESSURE: 138 MMHG | HEART RATE: 86 BPM

## 2024-11-27 DIAGNOSIS — C81.03 NODULAR LYMPHOCYTE PREDOMINANT HODGKIN LYMPHOMA OF INTRA-ABDOMINAL LYMPH NODES (MULTI): ICD-10-CM

## 2024-11-27 DIAGNOSIS — D57.09 PRIAPISM DUE TO SICKLE CELL DISEASE (MULTI): ICD-10-CM

## 2024-11-27 DIAGNOSIS — D57.00 SICKLE CELL CRISIS (MULTI): ICD-10-CM

## 2024-11-27 DIAGNOSIS — D57.09 SICKLE CELL DISEASE WITH CRISIS AND OTHER COMPLICATION: ICD-10-CM

## 2024-11-27 DIAGNOSIS — R52 ACUTE PAIN: ICD-10-CM

## 2024-11-27 DIAGNOSIS — N48.32 PRIAPISM DUE TO SICKLE CELL DISEASE (MULTI): ICD-10-CM

## 2024-11-27 DIAGNOSIS — K59.00 CONSTIPATION, UNSPECIFIED CONSTIPATION TYPE: Primary | ICD-10-CM

## 2024-11-27 DIAGNOSIS — D57.1 SICKLE CELL DISEASE WITHOUT CRISIS (MULTI): ICD-10-CM

## 2024-11-27 DIAGNOSIS — G47.34 NOCTURNAL HYPOXIA: ICD-10-CM

## 2024-11-27 LAB
ABO GROUP (TYPE) IN BLOOD: NORMAL
ALBUMIN SERPL BCP-MCNC: 4.7 G/DL (ref 3.4–5)
ALP SERPL-CCNC: 122 U/L (ref 33–120)
ALT SERPL W P-5'-P-CCNC: 36 U/L (ref 10–52)
AMYLASE SERPL-CCNC: 18 U/L (ref 29–103)
ANION GAP SERPL CALC-SCNC: 12 MMOL/L (ref 10–20)
ANTIBODY SCREEN: NORMAL
AST SERPL W P-5'-P-CCNC: 58 U/L (ref 9–39)
BASOPHILS # BLD AUTO: 0.05 X10*3/UL (ref 0–0.1)
BASOPHILS NFR BLD AUTO: 0.5 %
BILIRUB DIRECT SERPL-MCNC: 0.8 MG/DL (ref 0–0.3)
BILIRUB SERPL-MCNC: 8 MG/DL (ref 0–1.2)
BUN SERPL-MCNC: 6 MG/DL (ref 6–23)
BURR CELLS BLD QL SMEAR: NORMAL
CALCIUM SERPL-MCNC: 9.3 MG/DL (ref 8.6–10.3)
CHLORIDE SERPL-SCNC: 108 MMOL/L (ref 98–107)
CO2 SERPL-SCNC: 26 MMOL/L (ref 21–32)
CREAT SERPL-MCNC: 0.61 MG/DL (ref 0.5–1.3)
CREAT UR-MCNC: 77.6 MG/DL (ref 20–370)
EGFRCR SERPLBLD CKD-EPI 2021: >90 ML/MIN/1.73M*2
EOSINOPHIL # BLD AUTO: 0.35 X10*3/UL (ref 0–0.7)
EOSINOPHIL NFR BLD AUTO: 3.2 %
ERYTHROCYTE [DISTWIDTH] IN BLOOD BY AUTOMATED COUNT: 25 % (ref 11.5–14.5)
FERRITIN SERPL-MCNC: 200 NG/ML (ref 20–300)
GLUCOSE SERPL-MCNC: 73 MG/DL (ref 74–99)
HCT VFR BLD AUTO: 27 % (ref 41–52)
HGB BLD-MCNC: 9.9 G/DL (ref 13.5–17.5)
HGB RETIC QN: 34 PG (ref 28–38)
IMM GRANULOCYTES # BLD AUTO: 0.08 X10*3/UL (ref 0–0.7)
IMM GRANULOCYTES NFR BLD AUTO: 0.7 % (ref 0–0.9)
IMMATURE RETIC FRACTION: 25.2 %
LDH SERPL L TO P-CCNC: 596 U/L (ref 84–246)
LIPASE SERPL-CCNC: 20 U/L (ref 9–82)
LYMPHOCYTES # BLD AUTO: 2.39 X10*3/UL (ref 1.2–4.8)
LYMPHOCYTES NFR BLD AUTO: 21.8 %
MCH RBC QN AUTO: 30.8 PG (ref 26–34)
MCHC RBC AUTO-ENTMCNC: 36.7 G/DL (ref 32–36)
MCV RBC AUTO: 84 FL (ref 80–100)
MICROALBUMIN UR-MCNC: 13 MG/L
MICROALBUMIN/CREAT UR: 16.8 UG/MG CREAT
MONOCYTES # BLD AUTO: 1.37 X10*3/UL (ref 0.1–1)
MONOCYTES NFR BLD AUTO: 12.5 %
NEUTROPHILS # BLD AUTO: 6.73 X10*3/UL (ref 1.2–7.7)
NEUTROPHILS NFR BLD AUTO: 61.3 %
NRBC BLD-RTO: 1.2 /100 WBCS (ref 0–0)
OVALOCYTES BLD QL SMEAR: NORMAL
PLATELET # BLD AUTO: 281 X10*3/UL (ref 150–450)
POLYCHROMASIA BLD QL SMEAR: NORMAL
POTASSIUM SERPL-SCNC: 4.7 MMOL/L (ref 3.5–5.3)
PROT SERPL-MCNC: 7 G/DL (ref 6.4–8.2)
RBC # BLD AUTO: 3.21 X10*6/UL (ref 4.5–5.9)
RBC MORPH BLD: NORMAL
RETICS #: 0.89 X10*6/UL (ref 0.02–0.12)
RETICS/RBC NFR AUTO: 27.8 % (ref 0.5–2)
RH FACTOR (ANTIGEN D): NORMAL
SCHISTOCYTES BLD QL SMEAR: NORMAL
SICKLE CELLS BLD QL SMEAR: NORMAL
SODIUM SERPL-SCNC: 141 MMOL/L (ref 136–145)
TARGETS BLD QL SMEAR: NORMAL
WBC # BLD AUTO: 11 X10*3/UL (ref 4.4–11.3)

## 2024-11-27 PROCEDURE — 82728 ASSAY OF FERRITIN: CPT

## 2024-11-27 PROCEDURE — 82248 BILIRUBIN DIRECT: CPT

## 2024-11-27 PROCEDURE — 36415 COLL VENOUS BLD VENIPUNCTURE: CPT

## 2024-11-27 PROCEDURE — 85025 COMPLETE CBC W/AUTO DIFF WBC: CPT

## 2024-11-27 PROCEDURE — 83615 LACTATE (LD) (LDH) ENZYME: CPT

## 2024-11-27 PROCEDURE — 83020 HEMOGLOBIN ELECTROPHORESIS: CPT | Performed by: PATHOLOGY

## 2024-11-27 PROCEDURE — 82150 ASSAY OF AMYLASE: CPT

## 2024-11-27 PROCEDURE — 83690 ASSAY OF LIPASE: CPT

## 2024-11-27 PROCEDURE — 82043 UR ALBUMIN QUANTITATIVE: CPT

## 2024-11-27 PROCEDURE — 99215 OFFICE O/P EST HI 40 MIN: CPT | Performed by: PEDIATRICS

## 2024-11-27 PROCEDURE — 83021 HEMOGLOBIN CHROMOTOGRAPHY: CPT

## 2024-11-27 PROCEDURE — 86900 BLOOD TYPING SEROLOGIC ABO: CPT

## 2024-11-27 PROCEDURE — 80053 COMPREHEN METABOLIC PANEL: CPT

## 2024-11-27 PROCEDURE — 85045 AUTOMATED RETICULOCYTE COUNT: CPT

## 2024-11-27 ASSESSMENT — PAIN SCALES - GENERAL: PAINLEVEL_OUTOF10: 0-NO PAIN

## 2024-11-29 LAB
HEMOGLOBIN A2: ABNORMAL
HEMOGLOBIN A: ABNORMAL
HEMOGLOBIN F: ABNORMAL
HEMOGLOBIN IDENTIFICATION INTERPRETATION: ABNORMAL
HEMOGLOBIN S: 74.1 %
PATH REVIEW-HGB IDENTIFICATION: ABNORMAL

## 2024-12-02 LAB
HEMOGLOBIN A2: 3.7 % (ref 2–3.5)
HEMOGLOBIN A: 20.1 % (ref 95.8–98)
HEMOGLOBIN F: 2.1 % (ref 0–2)
HEMOGLOBIN IDENTIFICATION INTERPRETATION: ABNORMAL
HEMOGLOBIN S: 74.1 %
PATH REVIEW-HGB IDENTIFICATION: ABNORMAL

## 2024-12-04 ENCOUNTER — TELEPHONE (OUTPATIENT)
Dept: HEMATOLOGY/ONCOLOGY | Facility: HOSPITAL | Age: 24
End: 2024-12-04
Payer: COMMERCIAL

## 2024-12-04 DIAGNOSIS — C81.03 NODULAR LYMPHOCYTE PREDOMINANT HODGKIN LYMPHOMA OF INTRA-ABDOMINAL LYMPH NODES (MULTI): Primary | ICD-10-CM

## 2024-12-04 NOTE — TELEPHONE ENCOUNTER
[facility]  Dr. Stoll would like him to get a PET scan in March and follow up in the office after to review the results.  TOMA left with this information.  I will also send the patient a [employer] message.

## 2024-12-22 ENCOUNTER — HOSPITAL ENCOUNTER (INPATIENT)
Facility: HOSPITAL | Age: 24
DRG: 811 | End: 2024-12-22
Admitting: HOSPITALIST
Payer: COMMERCIAL

## 2024-12-22 ENCOUNTER — APPOINTMENT (OUTPATIENT)
Dept: RADIOLOGY | Facility: HOSPITAL | Age: 24
DRG: 811 | End: 2024-12-22
Payer: COMMERCIAL

## 2024-12-22 ENCOUNTER — CLINICAL SUPPORT (OUTPATIENT)
Dept: EMERGENCY MEDICINE | Facility: HOSPITAL | Age: 24
DRG: 811 | End: 2024-12-22
Payer: COMMERCIAL

## 2024-12-22 VITALS
DIASTOLIC BLOOD PRESSURE: 68 MMHG | WEIGHT: 170 LBS | TEMPERATURE: 100.4 F | BODY MASS INDEX: 21.82 KG/M2 | RESPIRATION RATE: 18 BRPM | OXYGEN SATURATION: 95 % | HEART RATE: 83 BPM | HEIGHT: 74 IN | SYSTOLIC BLOOD PRESSURE: 111 MMHG

## 2024-12-22 DIAGNOSIS — D57.00 SICKLE CELL PAIN CRISIS (MULTI): Primary | ICD-10-CM

## 2024-12-22 DIAGNOSIS — K80.50 CHOLEDOCHOLITHIASIS: ICD-10-CM

## 2024-12-22 DIAGNOSIS — D57.09 SICKLE CELL DISEASE WITH CRISIS AND OTHER COMPLICATION: ICD-10-CM

## 2024-12-22 DIAGNOSIS — D57.01 ACUTE CHEST SYNDROME (MULTI): ICD-10-CM

## 2024-12-22 DIAGNOSIS — D57.00 SICKLE CELL ANEMIA WITH PAIN (MULTI): ICD-10-CM

## 2024-12-22 DIAGNOSIS — R52 ACUTE PAIN: ICD-10-CM

## 2024-12-22 LAB
ABO GROUP (TYPE) IN BLOOD: NORMAL
ALBUMIN SERPL BCP-MCNC: 4.8 G/DL (ref 3.4–5)
ALP SERPL-CCNC: 127 U/L (ref 33–120)
ALT SERPL W P-5'-P-CCNC: 36 U/L (ref 10–52)
AMPHETAMINES UR QL SCN: ABNORMAL
ANION GAP BLDV CALCULATED.4IONS-SCNC: 15 MMOL/L (ref 10–25)
ANION GAP SERPL CALC-SCNC: 13 MMOL/L (ref 10–20)
ANTIBODY SCREEN: NORMAL
APPEARANCE UR: CLEAR
APTT PPP: 25 SECONDS (ref 27–38)
AST SERPL W P-5'-P-CCNC: 57 U/L (ref 9–39)
ATRIAL RATE: 89 BPM
BACTERIA BLD CULT: NORMAL
BACTERIA BLD CULT: NORMAL
BARBITURATES UR QL SCN: ABNORMAL
BASE EXCESS BLDV CALC-SCNC: 0.5 MMOL/L (ref -2–3)
BASOPHILS # BLD AUTO: 0.06 X10*3/UL (ref 0–0.1)
BASOPHILS NFR BLD AUTO: 0.5 %
BENZODIAZ UR QL SCN: ABNORMAL
BILIRUB SERPL-MCNC: 9 MG/DL (ref 0–1.2)
BILIRUB UR STRIP.AUTO-MCNC: NEGATIVE MG/DL
BODY TEMPERATURE: 37 DEGREES CELSIUS
BUN SERPL-MCNC: 11 MG/DL (ref 6–23)
BZE UR QL SCN: ABNORMAL
CA-I BLD-SCNC: 1.13 MMOL/L (ref 1.1–1.33)
CA-I BLDV-SCNC: 1.24 MMOL/L (ref 1.1–1.33)
CALCIUM SERPL-MCNC: 9.5 MG/DL (ref 8.6–10.6)
CANNABINOIDS UR QL SCN: ABNORMAL
CARDIAC TROPONIN I PNL SERPL HS: 7 NG/L (ref 0–53)
CHLORIDE BLDV-SCNC: 105 MMOL/L (ref 98–107)
CHLORIDE SERPL-SCNC: 106 MMOL/L (ref 98–107)
CO2 SERPL-SCNC: 25 MMOL/L (ref 21–32)
COLOR UR: YELLOW
CREAT SERPL-MCNC: 0.69 MG/DL (ref 0.5–1.3)
EGFRCR SERPLBLD CKD-EPI 2021: >90 ML/MIN/1.73M*2
EOSINOPHIL # BLD AUTO: 0.26 X10*3/UL (ref 0–0.7)
EOSINOPHIL NFR BLD AUTO: 2.1 %
ERYTHROCYTE [DISTWIDTH] IN BLOOD BY AUTOMATED COUNT: 26.4 % (ref 11.5–14.5)
FENTANYL+NORFENTANYL UR QL SCN: ABNORMAL
FIBRINOGEN PPP-MCNC: 218 MG/DL (ref 200–400)
FLUAV RNA RESP QL NAA+PROBE: NOT DETECTED
FLUBV RNA RESP QL NAA+PROBE: NOT DETECTED
GLUCOSE BLDV-MCNC: 126 MG/DL (ref 74–99)
GLUCOSE SERPL-MCNC: 119 MG/DL (ref 74–99)
GLUCOSE UR STRIP.AUTO-MCNC: NORMAL MG/DL
HCO3 BLDV-SCNC: 24.5 MMOL/L (ref 22–26)
HCT VFR BLD AUTO: 25.2 % (ref 41–52)
HCT VFR BLD EST: 29 % (ref 41–52)
HGB BLD-MCNC: 9.3 G/DL (ref 13.5–17.5)
HGB BLDV-MCNC: 9.7 G/DL (ref 13.5–17.5)
HGB RETIC QN: 31 PG (ref 28–38)
HOLD SPECIMEN: NORMAL
IMM GRANULOCYTES # BLD AUTO: 0.3 X10*3/UL (ref 0–0.7)
IMM GRANULOCYTES NFR BLD AUTO: 2.4 % (ref 0–0.9)
IMMATURE RETIC FRACTION: 33.6 %
INR PPP: 1.3 (ref 0.9–1.1)
KETONES UR STRIP.AUTO-MCNC: NEGATIVE MG/DL
LACTATE BLDV-SCNC: 1.4 MMOL/L (ref 0.4–2)
LDH SERPL L TO P-CCNC: 668 U/L (ref 84–246)
LEGIONELLA AG UR QL: NEGATIVE
LEUKOCYTE ESTERASE UR QL STRIP.AUTO: ABNORMAL
LIPASE SERPL-CCNC: 37 U/L (ref 9–82)
LYMPHOCYTES # BLD AUTO: 2.32 X10*3/UL (ref 1.2–4.8)
LYMPHOCYTES NFR BLD AUTO: 18.6 %
MCH RBC QN AUTO: 32.3 PG (ref 26–34)
MCHC RBC AUTO-ENTMCNC: 36.9 G/DL (ref 32–36)
MCV RBC AUTO: 88 FL (ref 80–100)
METHADONE UR QL SCN: ABNORMAL
MONOCYTES # BLD AUTO: 1.39 X10*3/UL (ref 0.1–1)
MONOCYTES NFR BLD AUTO: 11.2 %
MUCOUS THREADS #/AREA URNS AUTO: ABNORMAL /LPF
NEUTROPHILS # BLD AUTO: 8.12 X10*3/UL (ref 1.2–7.7)
NEUTROPHILS NFR BLD AUTO: 65.2 %
NITRITE UR QL STRIP.AUTO: NEGATIVE
NRBC BLD-RTO: 11.1 /100 WBCS (ref 0–0)
OPIATES UR QL SCN: ABNORMAL
OXYCODONE+OXYMORPHONE UR QL SCN: ABNORMAL
OXYHGB MFR BLDV: 69.7 % (ref 45–75)
P AXIS: 70 DEGREES
P OFFSET: 181 MS
P ONSET: 133 MS
PCO2 BLDV: 36 MM HG (ref 41–51)
PCP UR QL SCN: ABNORMAL
PH BLDV: 7.44 PH (ref 7.33–7.43)
PH UR STRIP.AUTO: 6.5 [PH]
PLATELET # BLD AUTO: 293 X10*3/UL (ref 150–450)
PO2 BLDV: 57 MM HG (ref 35–45)
POLYCHROMASIA BLD QL SMEAR: NORMAL
POTASSIUM BLDV-SCNC: 4.5 MMOL/L (ref 3.5–5.3)
POTASSIUM SERPL-SCNC: 4 MMOL/L (ref 3.5–5.3)
PR INTERVAL: 168 MS
PROT SERPL-MCNC: 7.1 G/DL (ref 6.4–8.2)
PROT UR STRIP.AUTO-MCNC: NEGATIVE MG/DL
PROTHROMBIN TIME: 14.3 SECONDS (ref 9.8–12.8)
Q ONSET: 217 MS
QRS COUNT: 15 BEATS
QRS DURATION: 98 MS
QT INTERVAL: 326 MS
QTC CALCULATION(BAZETT): 396 MS
QTC FREDERICIA: 371 MS
R AXIS: 67 DEGREES
RBC # BLD AUTO: 2.88 X10*6/UL (ref 4.5–5.9)
RBC # UR STRIP.AUTO: NEGATIVE /UL
RBC #/AREA URNS AUTO: ABNORMAL /HPF
RBC MORPH BLD: NORMAL
RETICS #: 0.78 X10*6/UL (ref 0.02–0.12)
RETICS/RBC NFR AUTO: 26.8 % (ref 0.5–2)
RH FACTOR (ANTIGEN D): NORMAL
S PNEUM AG UR QL: NEGATIVE
SAO2 % BLDV: 73 % (ref 45–75)
SARS-COV-2 RNA RESP QL NAA+PROBE: NOT DETECTED
SICKLE CELLS BLD QL SMEAR: NORMAL
SODIUM BLDV-SCNC: 140 MMOL/L (ref 136–145)
SODIUM SERPL-SCNC: 140 MMOL/L (ref 136–145)
SP GR UR STRIP.AUTO: 1.01
T AXIS: 71 DEGREES
T OFFSET: 380 MS
TARGETS BLD QL SMEAR: NORMAL
UROBILINOGEN UR STRIP.AUTO-MCNC: ABNORMAL MG/DL
VENTRICULAR RATE: 89 BPM
WBC # BLD AUTO: 12.5 X10*3/UL (ref 4.4–11.3)
WBC #/AREA URNS AUTO: ABNORMAL /HPF

## 2024-12-22 PROCEDURE — 36415 COLL VENOUS BLD VENIPUNCTURE: CPT

## 2024-12-22 PROCEDURE — 96376 TX/PRO/DX INJ SAME DRUG ADON: CPT

## 2024-12-22 PROCEDURE — 82977 ASSAY OF GGT: CPT

## 2024-12-22 PROCEDURE — 82330 ASSAY OF CALCIUM: CPT

## 2024-12-22 PROCEDURE — 2500000004 HC RX 250 GENERAL PHARMACY W/ HCPCS (ALT 636 FOR OP/ED): Performed by: PHYSICIAN ASSISTANT

## 2024-12-22 PROCEDURE — 1170000001 HC PRIVATE ONCOLOGY ROOM DAILY

## 2024-12-22 PROCEDURE — 2550000001 HC RX 255 CONTRASTS

## 2024-12-22 PROCEDURE — 87081 CULTURE SCREEN ONLY: CPT

## 2024-12-22 PROCEDURE — 93010 ELECTROCARDIOGRAM REPORT: CPT

## 2024-12-22 PROCEDURE — 87636 SARSCOV2 & INF A&B AMP PRB: CPT

## 2024-12-22 PROCEDURE — 83690 ASSAY OF LIPASE: CPT

## 2024-12-22 PROCEDURE — 84484 ASSAY OF TROPONIN QUANT: CPT

## 2024-12-22 PROCEDURE — 2500000004 HC RX 250 GENERAL PHARMACY W/ HCPCS (ALT 636 FOR OP/ED)

## 2024-12-22 PROCEDURE — 96366 THER/PROPH/DIAG IV INF ADDON: CPT

## 2024-12-22 PROCEDURE — 93005 ELECTROCARDIOGRAM TRACING: CPT

## 2024-12-22 PROCEDURE — 85384 FIBRINOGEN ACTIVITY: CPT

## 2024-12-22 PROCEDURE — 96367 TX/PROPH/DG ADDL SEQ IV INF: CPT

## 2024-12-22 PROCEDURE — 83020 HEMOGLOBIN ELECTROPHORESIS: CPT | Performed by: PATHOLOGY

## 2024-12-22 PROCEDURE — 87449 NOS EACH ORGANISM AG IA: CPT

## 2024-12-22 PROCEDURE — 80307 DRUG TEST PRSMV CHEM ANLYZR: CPT

## 2024-12-22 PROCEDURE — 86901 BLOOD TYPING SEROLOGIC RH(D): CPT

## 2024-12-22 PROCEDURE — 85045 AUTOMATED RETICULOCYTE COUNT: CPT

## 2024-12-22 PROCEDURE — 85610 PROTHROMBIN TIME: CPT

## 2024-12-22 PROCEDURE — HNCIP HEMOC NO CHARGE INPATIENT HOSPITAL: Performed by: STUDENT IN AN ORGANIZED HEALTH CARE EDUCATION/TRAINING PROGRAM

## 2024-12-22 PROCEDURE — 71275 CT ANGIOGRAPHY CHEST: CPT | Performed by: RADIOLOGY

## 2024-12-22 PROCEDURE — 96375 TX/PRO/DX INJ NEW DRUG ADDON: CPT

## 2024-12-22 PROCEDURE — 99285 EMERGENCY DEPT VISIT HI MDM: CPT | Mod: 25

## 2024-12-22 PROCEDURE — 96374 THER/PROPH/DIAG INJ IV PUSH: CPT | Mod: 59

## 2024-12-22 PROCEDURE — 85025 COMPLETE CBC W/AUTO DIFF WBC: CPT

## 2024-12-22 PROCEDURE — 2500000001 HC RX 250 WO HCPCS SELF ADMINISTERED DRUGS (ALT 637 FOR MEDICARE OP)

## 2024-12-22 PROCEDURE — 71046 X-RAY EXAM CHEST 2 VIEWS: CPT

## 2024-12-22 PROCEDURE — 81001 URINALYSIS AUTO W/SCOPE: CPT

## 2024-12-22 PROCEDURE — 71275 CT ANGIOGRAPHY CHEST: CPT

## 2024-12-22 PROCEDURE — 96361 HYDRATE IV INFUSION ADD-ON: CPT

## 2024-12-22 PROCEDURE — 99285 EMERGENCY DEPT VISIT HI MDM: CPT

## 2024-12-22 PROCEDURE — 87040 BLOOD CULTURE FOR BACTERIA: CPT

## 2024-12-22 PROCEDURE — 84132 ASSAY OF SERUM POTASSIUM: CPT

## 2024-12-22 PROCEDURE — 86920 COMPATIBILITY TEST SPIN: CPT

## 2024-12-22 PROCEDURE — 83615 LACTATE (LD) (LDH) ENZYME: CPT

## 2024-12-22 PROCEDURE — 96365 THER/PROPH/DIAG IV INF INIT: CPT

## 2024-12-22 PROCEDURE — 83021 HEMOGLOBIN CHROMOTOGRAPHY: CPT

## 2024-12-22 PROCEDURE — 87899 AGENT NOS ASSAY W/OPTIC: CPT

## 2024-12-22 PROCEDURE — 87086 URINE CULTURE/COLONY COUNT: CPT

## 2024-12-22 RX ORDER — ONDANSETRON HYDROCHLORIDE 2 MG/ML
4 INJECTION, SOLUTION INTRAVENOUS ONCE
Status: COMPLETED | OUTPATIENT
Start: 2024-12-22 | End: 2024-12-22

## 2024-12-22 RX ORDER — FOLIC ACID 1 MG/1
1 TABLET ORAL DAILY
Status: DISCONTINUED | OUTPATIENT
Start: 2024-12-22 | End: 2024-12-22

## 2024-12-22 RX ORDER — PSEUDOEPHEDRINE HYDROCHLORIDE 60 MG/1
60 TABLET ORAL EVERY 8 HOURS PRN
Status: DISCONTINUED | OUTPATIENT
Start: 2024-12-22 | End: 2024-12-31 | Stop reason: HOSPADM

## 2024-12-22 RX ORDER — VANCOMYCIN HYDROCHLORIDE 1 G/200ML
1000 INJECTION, SOLUTION INTRAVENOUS ONCE
Status: COMPLETED | OUTPATIENT
Start: 2024-12-22 | End: 2024-12-22

## 2024-12-22 RX ORDER — HYDROMORPHONE HYDROCHLORIDE 1 MG/ML
1 INJECTION, SOLUTION INTRAMUSCULAR; INTRAVENOUS; SUBCUTANEOUS ONCE
Status: COMPLETED | OUTPATIENT
Start: 2024-12-22 | End: 2024-12-22

## 2024-12-22 RX ORDER — PANTOPRAZOLE SODIUM 40 MG/1
40 TABLET, DELAYED RELEASE ORAL
Status: DISCONTINUED | OUTPATIENT
Start: 2024-12-22 | End: 2024-12-31 | Stop reason: HOSPADM

## 2024-12-22 RX ORDER — HYDROMORPHONE HYDROCHLORIDE 1 MG/ML
1 INJECTION, SOLUTION INTRAMUSCULAR; INTRAVENOUS; SUBCUTANEOUS
Status: COMPLETED | OUTPATIENT
Start: 2024-12-22 | End: 2024-12-22

## 2024-12-22 RX ORDER — ONDANSETRON 4 MG/1
4 TABLET, FILM COATED ORAL EVERY 8 HOURS PRN
Status: DISCONTINUED | OUTPATIENT
Start: 2024-12-22 | End: 2024-12-31 | Stop reason: HOSPADM

## 2024-12-22 RX ORDER — ENOXAPARIN SODIUM 100 MG/ML
40 INJECTION SUBCUTANEOUS EVERY 24 HOURS
Status: DISCONTINUED | OUTPATIENT
Start: 2024-12-22 | End: 2024-12-31 | Stop reason: HOSPADM

## 2024-12-22 RX ORDER — MEROPENEM AND SODIUM CHLORIDE 1 G/50ML
1 INJECTION, SOLUTION INTRAVENOUS EVERY 8 HOURS
Status: DISCONTINUED | OUTPATIENT
Start: 2024-12-22 | End: 2024-12-22

## 2024-12-22 RX ORDER — DIPHENHYDRAMINE HCL 25 MG
25 CAPSULE ORAL EVERY 6 HOURS PRN
Status: DISCONTINUED | OUTPATIENT
Start: 2024-12-22 | End: 2024-12-31 | Stop reason: HOSPADM

## 2024-12-22 RX ORDER — HYDROMORPHONE HYDROCHLORIDE 2 MG/ML
3 INJECTION, SOLUTION INTRAMUSCULAR; INTRAVENOUS; SUBCUTANEOUS EVERY 2 HOUR PRN
Status: DISCONTINUED | OUTPATIENT
Start: 2024-12-22 | End: 2024-12-22

## 2024-12-22 RX ORDER — KETOROLAC TROMETHAMINE 15 MG/ML
15 INJECTION, SOLUTION INTRAMUSCULAR; INTRAVENOUS EVERY 6 HOURS SCHEDULED
Status: COMPLETED | OUTPATIENT
Start: 2024-12-22 | End: 2024-12-25

## 2024-12-22 RX ORDER — ACETAMINOPHEN 325 MG/1
650 TABLET ORAL ONCE
Status: COMPLETED | OUTPATIENT
Start: 2024-12-22 | End: 2024-12-22

## 2024-12-22 RX ORDER — BACLOFEN 10 MG/1
5 TABLET ORAL 3 TIMES DAILY
Status: DISCONTINUED | OUTPATIENT
Start: 2024-12-22 | End: 2024-12-31 | Stop reason: HOSPADM

## 2024-12-22 RX ORDER — POLYETHYLENE GLYCOL 3350 17 G/17G
17 POWDER, FOR SOLUTION ORAL DAILY
Status: DISCONTINUED | OUTPATIENT
Start: 2024-12-22 | End: 2024-12-31 | Stop reason: HOSPADM

## 2024-12-22 RX ORDER — HYDROMORPHONE HYDROCHLORIDE 2 MG/ML
2 INJECTION, SOLUTION INTRAMUSCULAR; INTRAVENOUS; SUBCUTANEOUS EVERY 2 HOUR PRN
Status: DISCONTINUED | OUTPATIENT
Start: 2024-12-22 | End: 2024-12-22

## 2024-12-22 RX ORDER — METHOCARBAMOL 100 MG/ML
1000 INJECTION, SOLUTION INTRAMUSCULAR; INTRAVENOUS ONCE
Status: COMPLETED | OUTPATIENT
Start: 2024-12-22 | End: 2024-12-22

## 2024-12-22 RX ORDER — PROCHLORPERAZINE EDISYLATE 5 MG/ML
5 INJECTION INTRAMUSCULAR; INTRAVENOUS EVERY 6 HOURS PRN
Status: DISCONTINUED | OUTPATIENT
Start: 2024-12-22 | End: 2024-12-31 | Stop reason: HOSPADM

## 2024-12-22 RX ORDER — FOLIC ACID 1 MG/1
1 TABLET ORAL DAILY
Status: DISCONTINUED | OUTPATIENT
Start: 2024-12-22 | End: 2024-12-31 | Stop reason: HOSPADM

## 2024-12-22 RX ORDER — MORPHINE SULFATE 4 MG/ML
8 INJECTION INTRAVENOUS ONCE
Status: COMPLETED | OUTPATIENT
Start: 2024-12-22 | End: 2024-12-22

## 2024-12-22 RX ORDER — AMOXICILLIN 250 MG
2 CAPSULE ORAL NIGHTLY
Status: DISCONTINUED | OUTPATIENT
Start: 2024-12-22 | End: 2024-12-31 | Stop reason: HOSPADM

## 2024-12-22 RX ADMIN — HYDROMORPHONE HYDROCHLORIDE 4 MG: 2 INJECTION, SOLUTION INTRAMUSCULAR; INTRAVENOUS; SUBCUTANEOUS at 18:26

## 2024-12-22 RX ADMIN — HYDROMORPHONE HYDROCHLORIDE 1 MG: 1 INJECTION, SOLUTION INTRAMUSCULAR; INTRAVENOUS; SUBCUTANEOUS at 04:35

## 2024-12-22 RX ADMIN — HYDROMORPHONE HYDROCHLORIDE 1 MG: 1 INJECTION, SOLUTION INTRAMUSCULAR; INTRAVENOUS; SUBCUTANEOUS at 03:58

## 2024-12-22 RX ADMIN — HYDROMORPHONE HYDROCHLORIDE 3 MG: 2 INJECTION INTRAMUSCULAR; INTRAVENOUS; SUBCUTANEOUS at 10:18

## 2024-12-22 RX ADMIN — ONDANSETRON 4 MG: 2 INJECTION INTRAMUSCULAR; INTRAVENOUS at 12:21

## 2024-12-22 RX ADMIN — HYDROMORPHONE HYDROCHLORIDE 1 MG: 1 INJECTION, SOLUTION INTRAMUSCULAR; INTRAVENOUS; SUBCUTANEOUS at 05:41

## 2024-12-22 RX ADMIN — KETOROLAC TROMETHAMINE 15 MG: 15 INJECTION, SOLUTION INTRAMUSCULAR; INTRAVENOUS at 20:01

## 2024-12-22 RX ADMIN — PIPERACILLIN SODIUM AND TAZOBACTAM SODIUM 3.38 G: 3; .375 INJECTION, SOLUTION INTRAVENOUS at 22:03

## 2024-12-22 RX ADMIN — HYDROMORPHONE HYDROCHLORIDE 1 MG: 1 INJECTION, SOLUTION INTRAMUSCULAR; INTRAVENOUS; SUBCUTANEOUS at 13:45

## 2024-12-22 RX ADMIN — SENNOSIDES AND DOCUSATE SODIUM 2 TABLET: 50; 8.6 TABLET ORAL at 20:01

## 2024-12-22 RX ADMIN — PIPERACILLIN SODIUM AND TAZOBACTAM SODIUM 3.38 G: 3; .375 INJECTION, SOLUTION INTRAVENOUS at 16:21

## 2024-12-22 RX ADMIN — HYDROMORPHONE HYDROCHLORIDE 1 MG: 1 INJECTION, SOLUTION INTRAMUSCULAR; INTRAVENOUS; SUBCUTANEOUS at 04:54

## 2024-12-22 RX ADMIN — HYDROMORPHONE HYDROCHLORIDE 1 MG: 1 INJECTION, SOLUTION INTRAMUSCULAR; INTRAVENOUS; SUBCUTANEOUS at 08:16

## 2024-12-22 RX ADMIN — IOHEXOL 100 ML: 350 INJECTION, SOLUTION INTRAVENOUS at 06:40

## 2024-12-22 RX ADMIN — HYDROMORPHONE HYDROCHLORIDE 4 MG: 2 INJECTION, SOLUTION INTRAMUSCULAR; INTRAVENOUS; SUBCUTANEOUS at 16:21

## 2024-12-22 RX ADMIN — PANTOPRAZOLE SODIUM 40 MG: 40 TABLET, DELAYED RELEASE ORAL at 08:26

## 2024-12-22 RX ADMIN — METHOCARBAMOL 1000 MG: 1000 INJECTION, SOLUTION INTRAMUSCULAR; INTRAVENOUS at 04:33

## 2024-12-22 RX ADMIN — ONDANSETRON 4 MG: 2 INJECTION INTRAMUSCULAR; INTRAVENOUS at 03:58

## 2024-12-22 RX ADMIN — PROCHLORPERAZINE EDISYLATE 5 MG: 5 INJECTION INTRAMUSCULAR; INTRAVENOUS at 13:45

## 2024-12-22 RX ADMIN — BACLOFEN 5 MG: 10 TABLET ORAL at 11:49

## 2024-12-22 RX ADMIN — KETOROLAC TROMETHAMINE 15 MG: 15 INJECTION, SOLUTION INTRAMUSCULAR; INTRAVENOUS at 08:26

## 2024-12-22 RX ADMIN — PIPERACILLIN SODIUM AND TAZOBACTAM SODIUM 3.38 G: 3; .375 INJECTION, SOLUTION INTRAVENOUS at 10:52

## 2024-12-22 RX ADMIN — SODIUM CHLORIDE 1000 ML: 9 INJECTION, SOLUTION INTRAVENOUS at 04:33

## 2024-12-22 RX ADMIN — FOLIC ACID 1 MG: 1 TABLET ORAL at 11:49

## 2024-12-22 RX ADMIN — HYDROMORPHONE HYDROCHLORIDE 4 MG: 2 INJECTION, SOLUTION INTRAMUSCULAR; INTRAVENOUS; SUBCUTANEOUS at 23:18

## 2024-12-22 RX ADMIN — MEROPENEM AND SODIUM CHLORIDE 1 G: 1 INJECTION, SOLUTION INTRAVENOUS at 05:38

## 2024-12-22 RX ADMIN — HYDROMORPHONE HYDROCHLORIDE 4 MG: 2 INJECTION, SOLUTION INTRAMUSCULAR; INTRAVENOUS; SUBCUTANEOUS at 21:14

## 2024-12-22 RX ADMIN — HYDROMORPHONE HYDROCHLORIDE 3 MG: 2 INJECTION INTRAMUSCULAR; INTRAVENOUS; SUBCUTANEOUS at 12:22

## 2024-12-22 RX ADMIN — BACLOFEN 5 MG: 10 TABLET ORAL at 16:21

## 2024-12-22 RX ADMIN — BACLOFEN 5 MG: 10 TABLET ORAL at 20:01

## 2024-12-22 RX ADMIN — MORPHINE SULFATE 8 MG: 4 INJECTION INTRAVENOUS at 06:21

## 2024-12-22 RX ADMIN — KETOROLAC TROMETHAMINE 15 MG: 15 INJECTION, SOLUTION INTRAMUSCULAR; INTRAVENOUS at 13:45

## 2024-12-22 RX ADMIN — ACETAMINOPHEN 650 MG: 325 TABLET ORAL at 22:03

## 2024-12-22 RX ADMIN — VANCOMYCIN HYDROCHLORIDE 1000 MG: 1 INJECTION, SOLUTION INTRAVENOUS at 06:21

## 2024-12-22 SDOH — SOCIAL STABILITY: SOCIAL INSECURITY: WITHIN THE LAST YEAR, HAVE YOU BEEN AFRAID OF YOUR PARTNER OR EX-PARTNER?: NO

## 2024-12-22 SDOH — SOCIAL STABILITY: SOCIAL INSECURITY: ARE YOU OR HAVE YOU BEEN THREATENED OR ABUSED PHYSICALLY, EMOTIONALLY, OR SEXUALLY BY ANYONE?: NO

## 2024-12-22 SDOH — ECONOMIC STABILITY: FOOD INSECURITY: WITHIN THE PAST 12 MONTHS, THE FOOD YOU BOUGHT JUST DIDN'T LAST AND YOU DIDN'T HAVE MONEY TO GET MORE.: NEVER TRUE

## 2024-12-22 SDOH — ECONOMIC STABILITY: INCOME INSECURITY: IN THE PAST 12 MONTHS HAS THE ELECTRIC, GAS, OIL, OR WATER COMPANY THREATENED TO SHUT OFF SERVICES IN YOUR HOME?: NO

## 2024-12-22 SDOH — SOCIAL STABILITY: SOCIAL INSECURITY: ABUSE: ADULT

## 2024-12-22 SDOH — ECONOMIC STABILITY: FOOD INSECURITY: WITHIN THE PAST 12 MONTHS, YOU WORRIED THAT YOUR FOOD WOULD RUN OUT BEFORE YOU GOT THE MONEY TO BUY MORE.: NEVER TRUE

## 2024-12-22 SDOH — SOCIAL STABILITY: SOCIAL INSECURITY: WERE YOU ABLE TO COMPLETE ALL THE BEHAVIORAL HEALTH SCREENINGS?: YES

## 2024-12-22 SDOH — SOCIAL STABILITY: SOCIAL INSECURITY: DO YOU FEEL ANYONE HAS EXPLOITED OR TAKEN ADVANTAGE OF YOU FINANCIALLY OR OF YOUR PERSONAL PROPERTY?: NO

## 2024-12-22 SDOH — SOCIAL STABILITY: SOCIAL INSECURITY: WITHIN THE LAST YEAR, HAVE YOU BEEN HUMILIATED OR EMOTIONALLY ABUSED IN OTHER WAYS BY YOUR PARTNER OR EX-PARTNER?: NO

## 2024-12-22 SDOH — SOCIAL STABILITY: SOCIAL INSECURITY: ARE THERE ANY APPARENT SIGNS OF INJURIES/BEHAVIORS THAT COULD BE RELATED TO ABUSE/NEGLECT?: NO

## 2024-12-22 SDOH — SOCIAL STABILITY: SOCIAL INSECURITY
WITHIN THE LAST YEAR, HAVE YOU BEEN RAPED OR FORCED TO HAVE ANY KIND OF SEXUAL ACTIVITY BY YOUR PARTNER OR EX-PARTNER?: NO

## 2024-12-22 SDOH — SOCIAL STABILITY: SOCIAL INSECURITY: DO YOU FEEL UNSAFE GOING BACK TO THE PLACE WHERE YOU ARE LIVING?: NO

## 2024-12-22 SDOH — SOCIAL STABILITY: SOCIAL INSECURITY: DOES ANYONE TRY TO KEEP YOU FROM HAVING/CONTACTING OTHER FRIENDS OR DOING THINGS OUTSIDE YOUR HOME?: NO

## 2024-12-22 SDOH — SOCIAL STABILITY: SOCIAL INSECURITY: HAVE YOU HAD THOUGHTS OF HARMING ANYONE ELSE?: NO

## 2024-12-22 SDOH — SOCIAL STABILITY: SOCIAL INSECURITY: HAVE YOU HAD ANY THOUGHTS OF HARMING ANYONE ELSE?: NO

## 2024-12-22 SDOH — SOCIAL STABILITY: SOCIAL INSECURITY: HAS ANYONE EVER THREATENED TO HURT YOUR FAMILY OR YOUR PETS?: NO

## 2024-12-22 ASSESSMENT — COGNITIVE AND FUNCTIONAL STATUS - GENERAL
MOBILITY SCORE: 24
DAILY ACTIVITIY SCORE: 24
PATIENT BASELINE BEDBOUND: NO

## 2024-12-22 ASSESSMENT — LIFESTYLE VARIABLES
HOW MANY STANDARD DRINKS CONTAINING ALCOHOL DO YOU HAVE ON A TYPICAL DAY: PATIENT DOES NOT DRINK
HOW OFTEN DO YOU HAVE 6 OR MORE DRINKS ON ONE OCCASION: NEVER
HAVE YOU EVER FELT YOU SHOULD CUT DOWN ON YOUR DRINKING: NO
SKIP TO QUESTIONS 9-10: 1
HOW OFTEN DO YOU HAVE A DRINK CONTAINING ALCOHOL: NEVER
HAVE PEOPLE ANNOYED YOU BY CRITICIZING YOUR DRINKING: NO
EVER HAD A DRINK FIRST THING IN THE MORNING TO STEADY YOUR NERVES TO GET RID OF A HANGOVER: NO
AUDIT-C TOTAL SCORE: 0
AUDIT-C TOTAL SCORE: 0
TOTAL SCORE: 0
EVER FELT BAD OR GUILTY ABOUT YOUR DRINKING: NO

## 2024-12-22 ASSESSMENT — PAIN DESCRIPTION - LOCATION
LOCATION: CHEST
LOCATION: GENERALIZED
LOCATION: CHEST

## 2024-12-22 ASSESSMENT — ACTIVITIES OF DAILY LIVING (ADL)
GROOMING: INDEPENDENT
DRESSING YOURSELF: INDEPENDENT
ADEQUATE_TO_COMPLETE_ADL: YES
BATHING: INDEPENDENT
PATIENT'S MEMORY ADEQUATE TO SAFELY COMPLETE DAILY ACTIVITIES?: YES
TOILETING: INDEPENDENT
JUDGMENT_ADEQUATE_SAFELY_COMPLETE_DAILY_ACTIVITIES: YES
HEARING - LEFT EAR: FUNCTIONAL
FEEDING YOURSELF: INDEPENDENT
HEARING - RIGHT EAR: FUNCTIONAL
ASSISTIVE_DEVICE: EYEGLASSES
WALKS IN HOME: INDEPENDENT
LACK_OF_TRANSPORTATION: NO

## 2024-12-22 ASSESSMENT — PAIN SCALES - GENERAL
PAINLEVEL_OUTOF10: 10 - WORST POSSIBLE PAIN
PAINLEVEL_OUTOF10: 9
PAINLEVEL_OUTOF10: 10 - WORST POSSIBLE PAIN
PAINLEVEL_OUTOF10: 10 - WORST POSSIBLE PAIN
PAINLEVEL_OUTOF10: 9
PAINLEVEL_OUTOF10: 10 - WORST POSSIBLE PAIN

## 2024-12-22 ASSESSMENT — PAIN DESCRIPTION - FREQUENCY: FREQUENCY: CONSTANT/CONTINUOUS

## 2024-12-22 ASSESSMENT — PAIN DESCRIPTION - DESCRIPTORS
DESCRIPTORS: SHARP;SHOOTING;STABBING;THROBBING
DESCRIPTORS: HEAVINESS;PRESSURE

## 2024-12-22 ASSESSMENT — PAIN DESCRIPTION - ORIENTATION
ORIENTATION: MID
ORIENTATION: MID

## 2024-12-22 ASSESSMENT — PAIN SCALES - PAIN ASSESSMENT IN ADVANCED DEMENTIA (PAINAD): TOTALSCORE: MEDICATION (SEE MAR)

## 2024-12-22 ASSESSMENT — PAIN - FUNCTIONAL ASSESSMENT
PAIN_FUNCTIONAL_ASSESSMENT: 0-10

## 2024-12-22 ASSESSMENT — PAIN DESCRIPTION - PROGRESSION: CLINICAL_PROGRESSION: NOT CHANGED

## 2024-12-22 ASSESSMENT — PAIN DESCRIPTION - ONSET: ONSET: SUDDEN

## 2024-12-22 ASSESSMENT — PAIN DESCRIPTION - PAIN TYPE: TYPE: CHRONIC PAIN;ACUTE PAIN
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
## 2024-12-22 NOTE — CARE PLAN
Problem: Pain - Adult  Goal: Verbalizes/displays adequate comfort level or baseline comfort level  12/22/2024 1604 by Emilee Meraz RN  Outcome: Progressing  12/22/2024 1603 by Emilee Meraz RN  Outcome: Progressing     Problem: Discharge Planning  Goal: Discharge to home or other facility with appropriate resources  12/22/2024 1604 by Emilee Meraz RN  Outcome: Progressing  12/22/2024 1603 by Emilee Meraz RN  Outcome: Progressing     Problem: Safety - Adult  Goal: Free from fall injury  12/22/2024 1604 by Emilee Meraz RN  Outcome: Progressing  12/22/2024 1603 by Emilee Meraz RN  Outcome: Progressing     Problem: Pain  Goal: Takes deep breaths with improved pain control throughout the shift  12/22/2024 1604 by Emilee Meraz RN  Outcome: Progressing  12/22/2024 1603 by Emilee Meraz RN  Outcome: Progressing  Goal: Turns in bed with improved pain control throughout the shift  12/22/2024 1604 by Emilee Meraz RN  Outcome: Progressing  12/22/2024 1603 by Emilee Meraz RN  Outcome: Progressing  Goal: Walks with improved pain control throughout the shift  12/22/2024 1604 by Emilee Meraz RN  Outcome: Progressing  12/22/2024 1603 by Emilee Meraz RN  Outcome: Progressing  Goal: Performs ADL's with improved pain control throughout shift  12/22/2024 1604 by Emilee Meraz RN  Outcome: Progressing  12/22/2024 1603 by Emilee Meraz RN  Outcome: Progressing   The patient's goals for the shift include      The clinical goals for the shift include pt will remain HDS and VSS through end of shift 12/22/24 @ 1900

## 2024-12-22 NOTE — PROGRESS NOTES
Pharmacy Medication History Review    Joellen Narayan is a 24 y.o. male admitted for Sickle cell pain crisis (Multi). Pharmacy reviewed the patient's [id number] medications and allergies for accuracy.    Medications ADDED:  N/A  Medications CHANGED:  N/A  Medications REMOVED:   N/A     The list below reflects the updated PTA list.   Prior to Admission Medications   Prescriptions Last Dose Informant   baclofen (Lioresal) 5 mg tablet Not Taking Self   Sig: Take 1 tablet (5 mg) by mouth 3 times a day.   Patient not taking: Reported on 12/22/2024   flutamide (Eulexin) 125 mg capsule Not Taking Self   Sig: Take 1 capsule (125 mg total) by mouth 3 times a day.  Take with a full glass of water.   Patient not taking: Reported on 12/22/2024   folic acid (Folvite) 1 mg tablet 12/21/2024 Morning Self   Sig: Take by mouth once daily.   gabapentin (Neurontin) 100 mg capsule Not Taking Self   Sig: Take 1 capsule (100 mg) by mouth every 8 hours.   Patient not taking: Reported on 12/22/2024   oxyCODONE (Roxicodone) 20 mg immediate release tablet Not Taking Self   Sig: Take 1 tablet (20 mg) by mouth every 6 hours if needed for severe pain (7 - 10).   Patient not taking: Reported on 12/22/2024   pantoprazole (ProtoNix) 20 mg EC tablet Not Taking Self   Sig: Take 1 tablet (20 mg) by mouth once daily in the morning. Take before meals. Do not crush, chew, or split.   Patient not taking: Reported on 12/22/2024   pseudoephedrine (Sudogest) 60 mg tablet Not Taking    Sig: Take 1 tablet (60 mg) by mouth every 8 hours if needed for congestion for up to 10 days.   Patient not taking: Reported on 12/22/2024      Facility-Administered Medications: None        The list below reflects the updated allergy list. Please review each documented allergy for additional clarification and justification.  Allergies  Reviewed by Niecy Luke on 12/22/2024        Severity Reactions Comments    Cefepime Medium Hallucinations     Amoxicillin Low  "Hives     Ceftriaxone Low Hives, Rash             Patient accepts M2B at discharge.     Sources:   [facility]  Pharmacy dispense history  Patient interview Good historian     Additional Comments:  N/A      TEE OCHOA  Pharmacy Technician  12/22/24     Secure Chat preferred   If no response call [phone] or [facility] \"Med Rec\"   "
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
## 2024-12-22 NOTE — ED TRIAGE NOTES
"Pt to ED with c/o intense midsternal chest pain , abdominal pain, as well as bilateral lower extremity pain that started about 2 hours prior to arrival. States was getting up to go to the restroom and when he went back to bed this started. Pt had similar event in September but pain was mostly in his abdomen. He did require an exchange during that admission. Pt is writhing in bed and visibly uncomfortable. Pt states is out of his pain medication at home and has been for about a week or two. Pt has also had acute chest which required admission but that was \"a long time ago\". Pt is alert and oriented x4, ambulatory, from home. Does intermittently use O2 at home while sleeping. Pt is 88% on RA upon arrival. No signs of respiratory distress. MD called to bedside for evaluation d/t history and chest pain.   "

## 2024-12-22 NOTE — CONSULTS
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 The pain began suddenly three hours prior to presentation and is similar to previous ACS episodes. He denies fever, cough, or other infectious symptoms but reports intermittent oxygen use at home for nocturnal hypoxia. Physical exam is notable for tachycardia, hypoxia (SpO2 88% on room air), and severe pain. Imaging includes a chest X-ray without acute findings and a CTA showing new bibasilar ground-glass opacities concerning for ACS in the clinical context. Labs demonstrate leukocytosis, elevated LDH, reticulocytosis, and anemia (Hgb 9.3 g/dL), consistent with vaso-occlusive disease. Hematology was consulted for diagnostic clarification, management of ACS, and guidance on SCD-related complications.     Past Medical History  He has a past medical history of Corrosion of unspecified body region, unspecified degree (12/31/2014), Impetigo (01/04/2024), Personal history of diseases of the blood and blood-forming organs and certain disorders involving the immune mechanism, Personal history of other (healed) physical injury and trauma (01/03/2015), Personal history of other diseases of the circulatory system, Personal history of other diseases of the circulatory system, Rash and other nonspecific skin eruption (09/09/2014), and Sickle-cell disease with pain (Multi) (12/19/2023).    Surgical History  He has a past surgical history that includes IR CVC tunneled (6/21/2022); CT guided percutaneous biopsy LYMPH node superficial (11/18/2022); and CT guided percutaneous abdominal retroperitoneum biopsy (11/30/2023).    "  Social History  He reports that he quit smoking about a year ago. His smoking use included cigars. He has been exposed to tobacco smoke. He has never used smokeless tobacco. He reports current drug use. Drug: Marijuana. He reports that he does not drink alcohol.    Family History  Family History   Problem Relation Name Age of Onset    Sickle cell trait Mother      Sickle cell trait Father      Lung cancer Brother          Allergies  Cefepime, Amoxicillin, and Ceftriaxone    Review of Systems  Chest pain   Generalized body aches     Physical Exam  AXO3, in mild distress due to pain  Lungs clear, Ns1S2  No LE Edema  Abdomen soft. Non tender  No focal deficits     Last Recorded Vitals  Blood pressure 116/65, pulse 77, temperature 36.5 °C (97.7 °F), temperature source Temporal, resp. rate 20, height 1.88 m (6' 2.02\"), weight 77.1 kg (170 lb), SpO2 94%.    Relevant Results  Results for orders placed or performed during the hospital encounter of 12/22/24 (from the past 24 hours)   Blood Culture    Specimen: Peripheral Venipuncture; Blood culture   Result Value Ref Range    Blood Culture Loaded on Instrument - Culture in progress    CBC with Differential   Result Value Ref Range    WBC 12.5 (H) 4.4 - 11.3 x10*3/uL    nRBC 11.1 (H) 0.0 - 0.0 /100 WBCs    RBC 2.88 (L) 4.50 - 5.90 x10*6/uL    Hemoglobin 9.3 (L) 13.5 - 17.5 g/dL    Hematocrit 25.2 (L) 41.0 - 52.0 %    MCV 88 80 - 100 fL    MCH 32.3 26.0 - 34.0 pg    MCHC 36.9 (H) 32.0 - 36.0 g/dL    RDW 26.4 (H) 11.5 - 14.5 %    Platelets 293 150 - 450 x10*3/uL    Neutrophils % 65.2 40.0 - 80.0 %    Immature Granulocytes %, Automated 2.4 (H) 0.0 - 0.9 %    Lymphocytes % 18.6 13.0 - 44.0 %    Monocytes % 11.2 2.0 - 10.0 %    Eosinophils % 2.1 0.0 - 6.0 %    Basophils % 0.5 0.0 - 2.0 %    Neutrophils Absolute 8.12 (H) 1.20 - 7.70 x10*3/uL    Immature Granulocytes Absolute, Automated 0.30 0.00 - 0.70 x10*3/uL    Lymphocytes Absolute 2.32 1.20 - 4.80 x10*3/uL    Monocytes " Absolute 1.39 (H) 0.10 - 1.00 x10*3/uL    Eosinophils Absolute 0.26 0.00 - 0.70 x10*3/uL    Basophils Absolute 0.06 0.00 - 0.10 x10*3/uL   Comprehensive Metabolic Panel   Result Value Ref Range    Glucose 119 (H) 74 - 99 mg/dL    Sodium 140 136 - 145 mmol/L    Potassium 4.0 3.5 - 5.3 mmol/L    Chloride 106 98 - 107 mmol/L    Bicarbonate 25 21 - 32 mmol/L    Anion Gap 13 10 - 20 mmol/L    Urea Nitrogen 11 6 - 23 mg/dL    Creatinine 0.69 0.50 - 1.30 mg/dL    eGFR >90 >60 mL/min/1.73m*2    Calcium 9.5 8.6 - 10.6 mg/dL    Albumin 4.8 3.4 - 5.0 g/dL    Alkaline Phosphatase 127 (H) 33 - 120 U/L    Total Protein 7.1 6.4 - 8.2 g/dL    AST 57 (H) 9 - 39 U/L    Bilirubin, Total 9.0 (H) 0.0 - 1.2 mg/dL    ALT 36 10 - 52 U/L   Lactate Dehydrogenase   Result Value Ref Range     (H) 84 - 246 U/L   Reticulocyte Count   Result Value Ref Range    Retic % 26.8 (H) 0.5 - 2.0 %    Retic Absolute 0.778 (H) 0.022 - 0.118 x10*6/uL    Reticulocyte Hemoglobin 31 28 - 38 pg    Immature Retic fraction 33.6 (H) <=16.0 %   Morphology   Result Value Ref Range    RBC Morphology See Below     Polychromasia Mild     Sickle Cells Many     Target Cells Many    Blood Culture    Specimen: Peripheral Venipuncture; Blood culture   Result Value Ref Range    Blood Culture Loaded on Instrument - Culture in progress    Lavender Top   Result Value Ref Range    Extra Tube Hold for add-ons.    SST TOP   Result Value Ref Range    Extra Tube Hold for add-ons.    Lavender Top   Result Value Ref Range    Extra Tube Hold for add-ons.    Light Blue Top   Result Value Ref Range    Extra Tube Hold for add-ons.    Red Top   Result Value Ref Range    Extra Tube Hold for add-ons.    Streptococcus pneumoniae Antigen, Urine    Specimen: Clean Catch/Voided; Urine   Result Value Ref Range    Streptococcus pneumoniae Ag, Urine Negative Negative   Legionella Antigen, Urine    Specimen: Clean Catch/Voided; Urine   Result Value Ref Range    L. pneumophila Urine Ag Negative  Negative   Drug Screen, Urine   Result Value Ref Range    Amphetamine Screen, Urine Presumptive Negative Presumptive Negative    Barbiturate Screen, Urine Presumptive Negative Presumptive Negative    Benzodiazepines Screen, Urine Presumptive Negative Presumptive Negative    Cannabinoid Screen, Urine Presumptive Positive (A) Presumptive Negative    Cocaine Metabolite Screen, Urine Presumptive Negative Presumptive Negative    Fentanyl Screen, Urine Presumptive Negative Presumptive Negative    Opiate Screen, Urine Presumptive Positive (A) Presumptive Negative    Oxycodone Screen, Urine Presumptive Negative Presumptive Negative    PCP Screen, Urine Presumptive Negative Presumptive Negative    Methadone Screen, Urine Presumptive Negative Presumptive Negative   Urinalysis with Reflex Culture and Microscopic   Result Value Ref Range    Color, Urine Yellow Light-Yellow, Yellow, Dark-Yellow    Appearance, Urine Clear Clear    Specific Gravity, Urine 1.014 1.005 - 1.035    pH, Urine 6.5 5.0, 5.5, 6.0, 6.5, 7.0, 7.5, 8.0    Protein, Urine NEGATIVE NEGATIVE, 10 (TRACE), 20 (TRACE) mg/dL    Glucose, Urine Normal Normal mg/dL    Blood, Urine NEGATIVE NEGATIVE    Ketones, Urine NEGATIVE NEGATIVE mg/dL    Bilirubin, Urine NEGATIVE NEGATIVE    Urobilinogen, Urine 3 (1+) (A) Normal mg/dL    Nitrite, Urine NEGATIVE NEGATIVE    Leukocyte Esterase, Urine 25 Andrea/µL (A) NEGATIVE   Microscopic Only, Urine   Result Value Ref Range    WBC, Urine 6-10 (A) 1-5, NONE /HPF    RBC, Urine NONE NONE, 1-2, 3-5 /HPF    Mucus, Urine FEW Reference range not established. /LPF   Coagulation Screen   Result Value Ref Range    Protime 14.3 (H) 9.8 - 12.8 seconds    INR 1.3 (H) 0.9 - 1.1    aPTT 25 (L) 27 - 38 seconds   Calcium, ionized   Result Value Ref Range    POCT Calcium, Ionized 1.13 1.1 - 1.33 mmol/L   Type and screen   Result Value Ref Range    ABO TYPE B     Rh TYPE POS     ANTIBODY SCREEN NEG      *Note: Due to a large number of results and/or  encounters for the requested time period, some results have not been displayed. A complete set of results can be found in Results Review.         Assessment/Plan     24-year-old male with HbSS sickle cell disease and NLPHL presenting with acute chest syndrome. Management includes exchange transfusion, empiric antibiotics, pain control, and respiratory support.      Timeline  12/2023: Diagnosed with NLPHL; initiated on rituximab/prednisone.  6/7/2024: Completed 6 cycles of rituximab/prednisone.  7/2024-12/2024: Multiple hospitalizations for SCD crises, priapism, and biliary disease.  12/22/2024: Current presentation with severe chest pain and hypoxia; concerning for ACS.    Workup Summary  Imaging:  CTA Chest (12/22/2024): No PE; new bibasilar ground-glass opacities, concerning for ACS.  Chest X-Ray (12/22/2024): No acute cardiopulmonary process.  Labs:  Hgb: 9.3 g/dL; reticulocytosis 26.8% (absolute 0.778 × 10?/?L).  WBC: 12.5 × 10³/?L.  LDH: 668 U/L.  INR: 1.3 (mildly elevated baseline).  Coagulation panel: aPTT low (25 seconds).  Calcium (ionized): Normal.  Other: Hemoglobin electrophoresis pending.    Impression  This is a 24-year-old male with HbSS sickle cell disease presenting with acute chest syndrome, as evidenced by severe chest pain, hypoxia, and bibasilar ground-glass opacities on imaging. Elevated LDH, reticulocytosis, and anemia suggest ongoing hemolysis. Infection and thromboembolism have been excluded. Pain appears multifactorial, likely from underlying vaso-occlusion and rib infarction.      Assessment & Recommendations  Acute Chest Syndrome:  Plan:  Exchange transfusion is needed given current clinical picture, please arrange with transfusion medicine and blood bank.  Continue empiric antibiotics   Please rule out infectious causes (viral and bacterial)  Initiate incentive spirometry to prevent atelectasis and further hypoxia.  Monitor for respiratory decompensation; escalate to ICU if  needed.    Vaso-Occlusive Pain Crisis:  Severe pain refractory to ED management.  Plan:  Continue Current regimen   Optimize hydration with IV fluids (avoid overhydration to prevent ACS exacerbation).      Thank you for this consult, we will continue to follow.   Patient discussed with attending physician, Dr. Joshi  who agrees with the above.      James Ochoa  Hematology-Oncology Fellow, PGY4  Hematology Consult Pager: [phone]  Oncology Consult Pager: [phone]

## 2024-12-22 NOTE — DISCHARGE INSTRUCTIONS
For pain medication refills please call the sickle cell prescription line at [phone].  For scheduling follow up with sickle cell team call [phone].  If you feel your pain is uncontrolled at home, please call the sickle cell acute care clinic ( if you qualify)  Monday-Friday, 8a-2p at [phone].

## 2024-12-23 ENCOUNTER — APPOINTMENT (OUTPATIENT)
Dept: RADIOLOGY | Facility: HOSPITAL | Age: 24
DRG: 811 | End: 2024-12-23
Payer: COMMERCIAL

## 2024-12-23 ENCOUNTER — APPOINTMENT (OUTPATIENT)
Dept: OTHER | Facility: HOSPITAL | Age: 24
DRG: 811 | End: 2024-12-23
Payer: COMMERCIAL

## 2024-12-23 LAB
ALBUMIN SERPL BCP-MCNC: 4.6 G/DL (ref 3.4–5)
ALP SERPL-CCNC: 108 U/L (ref 33–120)
ALT SERPL W P-5'-P-CCNC: 26 U/L (ref 10–52)
ANION GAP SERPL CALC-SCNC: 14 MMOL/L (ref 10–20)
AST SERPL W P-5'-P-CCNC: 55 U/L (ref 9–39)
BACTERIA UR CULT: NO GROWTH
BASO STIPL BLD QL SMEAR: PRESENT
BASOPHILS # BLD MANUAL: 0 X10*3/UL (ref 0–0.1)
BASOPHILS NFR BLD MANUAL: 0 %
BILIRUB SERPL-MCNC: 11.2 MG/DL (ref 0–1.2)
BLOOD EXPIRATION DATE: NORMAL
BUN SERPL-MCNC: 9 MG/DL (ref 6–23)
CA-I BLD-SCNC: 1.14 MMOL/L (ref 1.1–1.33)
CALCIUM SERPL-MCNC: 9.3 MG/DL (ref 8.6–10.6)
CHLORIDE SERPL-SCNC: 108 MMOL/L (ref 98–107)
CO2 SERPL-SCNC: 23 MMOL/L (ref 21–32)
CREAT SERPL-MCNC: 0.77 MG/DL (ref 0.5–1.3)
DISPENSE STATUS: NORMAL
EGFRCR SERPLBLD CKD-EPI 2021: >90 ML/MIN/1.73M*2
EOSINOPHIL # BLD MANUAL: 0.46 X10*3/UL (ref 0–0.7)
EOSINOPHIL NFR BLD MANUAL: 3.4 %
ERYTHROCYTE [DISTWIDTH] IN BLOOD BY AUTOMATED COUNT: 20.1 % (ref 11.5–14.5)
ERYTHROCYTE [DISTWIDTH] IN BLOOD BY AUTOMATED COUNT: 26.1 % (ref 11.5–14.5)
GLUCOSE BLD MANUAL STRIP-MCNC: 94 MG/DL (ref 74–99)
GLUCOSE SERPL-MCNC: 79 MG/DL (ref 74–99)
HCT VFR BLD AUTO: 22.2 % (ref 41–52)
HCT VFR BLD AUTO: 26.8 % (ref 41–52)
HGB BLD-MCNC: 8.1 G/DL (ref 13.5–17.5)
HGB BLD-MCNC: 9.4 G/DL (ref 13.5–17.5)
HGB RETIC QN: 32 PG (ref 28–38)
HYPOCHROMIA BLD QL SMEAR: ABNORMAL
IMM GRANULOCYTES # BLD AUTO: 0.37 X10*3/UL (ref 0–0.7)
IMM GRANULOCYTES NFR BLD AUTO: 2.8 % (ref 0–0.9)
IMMATURE RETIC FRACTION: 25.7 %
LDH SERPL L TO P-CCNC: 736 U/L (ref 84–246)
LYMPHOCYTES # BLD MANUAL: 1.05 X10*3/UL (ref 1.2–4.8)
LYMPHOCYTES NFR BLD MANUAL: 7.8 %
MCH RBC QN AUTO: 30 PG (ref 26–34)
MCH RBC QN AUTO: 32 PG (ref 26–34)
MCHC RBC AUTO-ENTMCNC: 35.1 G/DL (ref 32–36)
MCHC RBC AUTO-ENTMCNC: 36.5 G/DL (ref 32–36)
MCV RBC AUTO: 86 FL (ref 80–100)
MCV RBC AUTO: 88 FL (ref 80–100)
MONOCYTES # BLD MANUAL: 0.92 X10*3/UL (ref 0.1–1)
MONOCYTES NFR BLD MANUAL: 6.9 %
NEUTS SEG # BLD MANUAL: 10.75 X10*3/UL (ref 1.2–7)
NEUTS SEG NFR BLD MANUAL: 80.2 %
NRBC BLD-RTO: 10 /100 WBCS (ref 0–0)
NRBC BLD-RTO: 7.1 /100 WBCS (ref 0–0)
PLATELET # BLD AUTO: 165 X10*3/UL (ref 150–450)
PLATELET # BLD AUTO: 293 X10*3/UL (ref 150–450)
POLYCHROMASIA BLD QL SMEAR: ABNORMAL
POTASSIUM SERPL-SCNC: 4.2 MMOL/L (ref 3.5–5.3)
PRODUCT BLOOD TYPE: 1700
PRODUCT BLOOD TYPE: 5100
PRODUCT BLOOD TYPE: 5100
PRODUCT BLOOD TYPE: 7300
PRODUCT BLOOD TYPE: 9500
PRODUCT CODE: NORMAL
PROT SERPL-MCNC: 6.8 G/DL (ref 6.4–8.2)
RBC # BLD AUTO: 2.53 X10*6/UL (ref 4.5–5.9)
RBC # BLD AUTO: 3.13 X10*6/UL (ref 4.5–5.9)
RBC MORPH BLD: ABNORMAL
RETICS #: 0.76 X10*6/UL (ref 0.02–0.12)
RETICS/RBC NFR AUTO: 29.9 % (ref 0.5–2)
SCHISTOCYTES BLD QL SMEAR: ABNORMAL
SICKLE CELLS BLD QL SMEAR: ABNORMAL
SODIUM SERPL-SCNC: 141 MMOL/L (ref 136–145)
STAPHYLOCOCCUS SPEC CULT: NORMAL
TOTAL CELLS COUNTED BLD: 116
UNIT ABO: NORMAL
UNIT NUMBER: NORMAL
UNIT RH: NORMAL
UNIT VOLUME: 277
UNIT VOLUME: 278
UNIT VOLUME: 283
UNIT VOLUME: 289
UNIT VOLUME: 328
UNIT VOLUME: 350
VARIANT LYMPHS # BLD MANUAL: 0.23 X10*3/UL (ref 0–0.5)
VARIANT LYMPHS NFR BLD: 1.7 %
WBC # BLD AUTO: 13.4 X10*3/UL (ref 4.4–11.3)
WBC # BLD AUTO: 8.1 X10*3/UL (ref 4.4–11.3)
XM INTEP: NORMAL

## 2024-12-23 PROCEDURE — 2500000004 HC RX 250 GENERAL PHARMACY W/ HCPCS (ALT 636 FOR OP/ED)

## 2024-12-23 PROCEDURE — 99233 SBSQ HOSP IP/OBS HIGH 50: CPT

## 2024-12-23 PROCEDURE — 7100000009 HC PHASE TWO TIME - INITIAL BASE CHARGE

## 2024-12-23 PROCEDURE — 76937 US GUIDE VASCULAR ACCESS: CPT | Performed by: STUDENT IN AN ORGANIZED HEALTH CARE EDUCATION/TRAINING PROGRAM

## 2024-12-23 PROCEDURE — 36415 COLL VENOUS BLD VENIPUNCTURE: CPT

## 2024-12-23 PROCEDURE — 2500000004 HC RX 250 GENERAL PHARMACY W/ HCPCS (ALT 636 FOR OP/ED): Performed by: STUDENT IN AN ORGANIZED HEALTH CARE EDUCATION/TRAINING PROGRAM

## 2024-12-23 PROCEDURE — 85660 RBC SICKLE CELL TEST: CPT

## 2024-12-23 PROCEDURE — 36556 INSERT NON-TUNNEL CV CATH: CPT | Performed by: STUDENT IN AN ORGANIZED HEALTH CARE EDUCATION/TRAINING PROGRAM

## 2024-12-23 PROCEDURE — C1894 INTRO/SHEATH, NON-LASER: HCPCS

## 2024-12-23 PROCEDURE — 36512 APHERESIS RBC: CPT | Performed by: PATHOLOGY

## 2024-12-23 PROCEDURE — 83615 LACTATE (LD) (LDH) ENZYME: CPT

## 2024-12-23 PROCEDURE — 85007 BL SMEAR W/DIFF WBC COUNT: CPT

## 2024-12-23 PROCEDURE — 2500000004 HC RX 250 GENERAL PHARMACY W/ HCPCS (ALT 636 FOR OP/ED): Mod: JZ

## 2024-12-23 PROCEDURE — 36430 TRANSFUSION BLD/BLD COMPNT: CPT

## 2024-12-23 PROCEDURE — 7100000010 HC PHASE TWO TIME - EACH INCREMENTAL 1 MINUTE

## 2024-12-23 PROCEDURE — 83020 HEMOGLOBIN ELECTROPHORESIS: CPT | Performed by: PATHOLOGY

## 2024-12-23 PROCEDURE — 83021 HEMOGLOBIN CHROMOTOGRAPHY: CPT

## 2024-12-23 PROCEDURE — 85027 COMPLETE CBC AUTOMATED: CPT

## 2024-12-23 PROCEDURE — 02HV33Z INSERTION OF INFUSION DEVICE INTO SUPERIOR VENA CAVA, PERCUTANEOUS APPROACH: ICD-10-PCS | Performed by: STUDENT IN AN ORGANIZED HEALTH CARE EDUCATION/TRAINING PROGRAM

## 2024-12-23 PROCEDURE — 2720000007 HC OR 272 NO HCPCS

## 2024-12-23 PROCEDURE — 85045 AUTOMATED RETICULOCYTE COUNT: CPT

## 2024-12-23 PROCEDURE — 1170000001 HC PRIVATE ONCOLOGY ROOM DAILY

## 2024-12-23 PROCEDURE — 82330 ASSAY OF CALCIUM: CPT

## 2024-12-23 PROCEDURE — 2500000001 HC RX 250 WO HCPCS SELF ADMINISTERED DRUGS (ALT 637 FOR MEDICARE OP)

## 2024-12-23 PROCEDURE — 2780000003 HC OR 278 NO HCPCS

## 2024-12-23 PROCEDURE — 99221 1ST HOSP IP/OBS SF/LOW 40: CPT

## 2024-12-23 PROCEDURE — 99152 MOD SED SAME PHYS/QHP 5/>YRS: CPT

## 2024-12-23 PROCEDURE — 99153 MOD SED SAME PHYS/QHP EA: CPT

## 2024-12-23 PROCEDURE — 82947 ASSAY GLUCOSE BLOOD QUANT: CPT

## 2024-12-23 PROCEDURE — P9040 RBC LEUKOREDUCED IRRADIATED: HCPCS

## 2024-12-23 PROCEDURE — C1752 CATH,HEMODIALYSIS,SHORT-TERM: HCPCS

## 2024-12-23 PROCEDURE — 86902 BLOOD TYPE ANTIGEN DONOR EA: CPT

## 2024-12-23 PROCEDURE — 84075 ASSAY ALKALINE PHOSPHATASE: CPT

## 2024-12-23 PROCEDURE — 36512 APHERESIS RBC: CPT

## 2024-12-23 RX ORDER — HEPARIN SODIUM 1000 [USP'U]/ML
1000 INJECTION, SOLUTION INTRAVENOUS; SUBCUTANEOUS ONCE
Status: COMPLETED | OUTPATIENT
Start: 2024-12-23 | End: 2024-12-23

## 2024-12-23 RX ORDER — CALCIUM CARBONATE 200(500)MG
1500 TABLET,CHEWABLE ORAL EVERY 5 MIN PRN
Status: DISCONTINUED | OUTPATIENT
Start: 2024-12-23 | End: 2024-12-23 | Stop reason: HOSPADM

## 2024-12-23 RX ORDER — DIPHENHYDRAMINE HCL 50 MG
50 CAPSULE ORAL ONCE
Status: COMPLETED | OUTPATIENT
Start: 2024-12-23 | End: 2024-12-23

## 2024-12-23 RX ORDER — FENTANYL CITRATE 50 UG/ML
INJECTION, SOLUTION INTRAMUSCULAR; INTRAVENOUS
Status: COMPLETED | OUTPATIENT
Start: 2024-12-23 | End: 2024-12-23

## 2024-12-23 RX ORDER — MIDAZOLAM HYDROCHLORIDE 1 MG/ML
INJECTION INTRAMUSCULAR; INTRAVENOUS
Status: COMPLETED | OUTPATIENT
Start: 2024-12-23 | End: 2024-12-23

## 2024-12-23 RX ORDER — ACETAMINOPHEN 325 MG/1
650 TABLET ORAL ONCE
Status: COMPLETED | OUTPATIENT
Start: 2024-12-23 | End: 2024-12-23

## 2024-12-23 RX ORDER — DEXTROSE MONOHYDRATE AND SODIUM CHLORIDE 5; .45 G/100ML; G/100ML
75 INJECTION, SOLUTION INTRAVENOUS CONTINUOUS
Status: ACTIVE | OUTPATIENT
Start: 2024-12-23 | End: 2024-12-24

## 2024-12-23 RX ORDER — HYDROMORPHONE HCL/0.9% NACL/PF 15 MG/30ML
PATIENT CONTROLLED ANALGESIA SYRINGE INTRAVENOUS CONTINUOUS
Status: DISCONTINUED | OUTPATIENT
Start: 2024-12-23 | End: 2024-12-24 | Stop reason: ALTCHOICE

## 2024-12-23 RX ORDER — NALOXONE HYDROCHLORIDE 0.4 MG/ML
0.2 INJECTION, SOLUTION INTRAMUSCULAR; INTRAVENOUS; SUBCUTANEOUS AS NEEDED
Status: DISCONTINUED | OUTPATIENT
Start: 2024-12-23 | End: 2024-12-31 | Stop reason: HOSPADM

## 2024-12-23 RX ORDER — DIPHENHYDRAMINE HYDROCHLORIDE 50 MG/ML
25 INJECTION INTRAMUSCULAR; INTRAVENOUS EVERY 5 MIN PRN
Status: DISCONTINUED | OUTPATIENT
Start: 2024-12-23 | End: 2024-12-23 | Stop reason: HOSPADM

## 2024-12-23 RX ADMIN — DIPHENHYDRAMINE HYDROCHLORIDE 50 MG: 50 CAPSULE ORAL at 11:59

## 2024-12-23 RX ADMIN — PIPERACILLIN SODIUM AND TAZOBACTAM SODIUM 3.38 G: 3; .375 INJECTION, SOLUTION INTRAVENOUS at 16:56

## 2024-12-23 RX ADMIN — CALCIUM GLUCONATE 25 ML/HR: 20 INJECTION, SOLUTION INTRAVENOUS at 12:30

## 2024-12-23 RX ADMIN — HYDROMORPHONE HYDROCHLORIDE 4 MG: 2 INJECTION, SOLUTION INTRAMUSCULAR; INTRAVENOUS; SUBCUTANEOUS at 14:34

## 2024-12-23 RX ADMIN — PIPERACILLIN SODIUM AND TAZOBACTAM SODIUM 3.38 G: 3; .375 INJECTION, SOLUTION INTRAVENOUS at 21:36

## 2024-12-23 RX ADMIN — HYDROMORPHONE HYDROCHLORIDE 4 MG: 2 INJECTION, SOLUTION INTRAMUSCULAR; INTRAVENOUS; SUBCUTANEOUS at 16:56

## 2024-12-23 RX ADMIN — FOLIC ACID 1 MG: 1 TABLET ORAL at 09:49

## 2024-12-23 RX ADMIN — KETOROLAC TROMETHAMINE 15 MG: 15 INJECTION, SOLUTION INTRAMUSCULAR; INTRAVENOUS at 21:28

## 2024-12-23 RX ADMIN — MIDAZOLAM HYDROCHLORIDE 1 MG: 1 INJECTION, SOLUTION INTRAMUSCULAR; INTRAVENOUS at 08:44

## 2024-12-23 RX ADMIN — HYDROMORPHONE HYDROCHLORIDE 4 MG: 2 INJECTION, SOLUTION INTRAMUSCULAR; INTRAVENOUS; SUBCUTANEOUS at 03:22

## 2024-12-23 RX ADMIN — DEXTROSE AND SODIUM CHLORIDE 75 ML/HR: 5; 450 INJECTION, SOLUTION INTRAVENOUS at 18:58

## 2024-12-23 RX ADMIN — FENTANYL CITRATE 50 MCG: 50 INJECTION, SOLUTION INTRAMUSCULAR; INTRAVENOUS at 08:43

## 2024-12-23 RX ADMIN — FENTANYL CITRATE 50 MCG: 50 INJECTION, SOLUTION INTRAMUSCULAR; INTRAVENOUS at 08:46

## 2024-12-23 RX ADMIN — MIDAZOLAM HYDROCHLORIDE 1 MG: 1 INJECTION, SOLUTION INTRAMUSCULAR; INTRAVENOUS at 08:31

## 2024-12-23 RX ADMIN — PIPERACILLIN SODIUM AND TAZOBACTAM SODIUM 3.38 G: 3; .375 INJECTION, SOLUTION INTRAVENOUS at 05:08

## 2024-12-23 RX ADMIN — HYDROMORPHONE HYDROCHLORIDE 4 MG: 2 INJECTION, SOLUTION INTRAMUSCULAR; INTRAVENOUS; SUBCUTANEOUS at 09:49

## 2024-12-23 RX ADMIN — KETOROLAC TROMETHAMINE 15 MG: 15 INJECTION, SOLUTION INTRAMUSCULAR; INTRAVENOUS at 02:40

## 2024-12-23 RX ADMIN — FENTANYL CITRATE 50 MCG: 50 INJECTION, SOLUTION INTRAMUSCULAR; INTRAVENOUS at 08:31

## 2024-12-23 RX ADMIN — PANTOPRAZOLE SODIUM 40 MG: 40 TABLET, DELAYED RELEASE ORAL at 09:48

## 2024-12-23 RX ADMIN — KETOROLAC TROMETHAMINE 15 MG: 15 INJECTION, SOLUTION INTRAMUSCULAR; INTRAVENOUS at 14:33

## 2024-12-23 RX ADMIN — HEPARIN SODIUM 1000 UNITS: 1000 INJECTION INTRAVENOUS; SUBCUTANEOUS at 14:25

## 2024-12-23 RX ADMIN — HYDROMORPHONE HYDROCHLORIDE 4 MG: 2 INJECTION, SOLUTION INTRAMUSCULAR; INTRAVENOUS; SUBCUTANEOUS at 18:57

## 2024-12-23 RX ADMIN — HYDROMORPHONE HYDROCHLORIDE 2 MG: 2 INJECTION, SOLUTION INTRAMUSCULAR; INTRAVENOUS; SUBCUTANEOUS at 00:59

## 2024-12-23 RX ADMIN — POLYETHYLENE GLYCOL 3350 17 G: 17 POWDER, FOR SOLUTION ORAL at 09:48

## 2024-12-23 RX ADMIN — MIDAZOLAM HYDROCHLORIDE 1 MG: 1 INJECTION, SOLUTION INTRAMUSCULAR; INTRAVENOUS at 08:46

## 2024-12-23 RX ADMIN — BACLOFEN 5 MG: 10 TABLET ORAL at 09:49

## 2024-12-23 RX ADMIN — BACLOFEN 5 MG: 10 TABLET ORAL at 21:28

## 2024-12-23 RX ADMIN — PIPERACILLIN SODIUM AND TAZOBACTAM SODIUM 3.38 G: 3; .375 INJECTION, SOLUTION INTRAVENOUS at 09:49

## 2024-12-23 RX ADMIN — Medication: at 23:24

## 2024-12-23 RX ADMIN — HYDROMORPHONE HYDROCHLORIDE 4 MG: 2 INJECTION, SOLUTION INTRAMUSCULAR; INTRAVENOUS; SUBCUTANEOUS at 21:28

## 2024-12-23 RX ADMIN — KETOROLAC TROMETHAMINE 15 MG: 15 INJECTION, SOLUTION INTRAMUSCULAR; INTRAVENOUS at 09:59

## 2024-12-23 RX ADMIN — ACETAMINOPHEN 650 MG: 325 TABLET ORAL at 11:59

## 2024-12-23 RX ADMIN — HYDROMORPHONE HYDROCHLORIDE 4 MG: 2 INJECTION, SOLUTION INTRAMUSCULAR; INTRAVENOUS; SUBCUTANEOUS at 05:23

## 2024-12-23 RX ADMIN — BACLOFEN 5 MG: 10 TABLET ORAL at 14:33

## 2024-12-23 RX ADMIN — SENNOSIDES AND DOCUSATE SODIUM 2 TABLET: 50; 8.6 TABLET ORAL at 21:27

## 2024-12-23 RX ADMIN — HYDROMORPHONE HYDROCHLORIDE 4 MG: 2 INJECTION, SOLUTION INTRAMUSCULAR; INTRAVENOUS; SUBCUTANEOUS at 12:21

## 2024-12-23 ASSESSMENT — PAIN SCALES - GENERAL
PAINLEVEL_OUTOF10: 8
PAINLEVEL_OUTOF10: 10 - WORST POSSIBLE PAIN
PAINLEVEL_OUTOF10: 0 - NO PAIN
PAINLEVEL_OUTOF10: 10 - WORST POSSIBLE PAIN
PAINLEVEL_OUTOF10: 6
PAINLEVEL_OUTOF10: 9
PAINLEVEL_OUTOF10: 10 - WORST POSSIBLE PAIN
PAINLEVEL_OUTOF10: 9
PAINLEVEL_OUTOF10: 5 - MODERATE PAIN
PAINLEVEL_OUTOF10: 10 - WORST POSSIBLE PAIN
PAINLEVEL_OUTOF10: 9
PAINLEVEL_OUTOF10: 10 - WORST POSSIBLE PAIN
PAINLEVEL_OUTOF10: 8
PAINLEVEL_OUTOF10: 10 - WORST POSSIBLE PAIN
PAINLEVEL_OUTOF10: 9
PAINLEVEL_OUTOF10: 0 - NO PAIN
PAINLEVEL_OUTOF10: 10 - WORST POSSIBLE PAIN
PAINLEVEL_OUTOF10: 9
PAINLEVEL_OUTOF10: 9
PAINLEVEL_OUTOF10: 8
PAINLEVEL_OUTOF10: 0 - NO PAIN
PAINLEVEL_OUTOF10: 9

## 2024-12-23 ASSESSMENT — COGNITIVE AND FUNCTIONAL STATUS - GENERAL
DAILY ACTIVITIY SCORE: 24
MOBILITY SCORE: 24
MOBILITY SCORE: 24
DAILY ACTIVITIY SCORE: 24

## 2024-12-23 ASSESSMENT — PAIN - FUNCTIONAL ASSESSMENT

## 2024-12-23 ASSESSMENT — PAIN DESCRIPTION - LOCATION
LOCATION: GENERALIZED

## 2024-12-23 NOTE — POST-PROCEDURE NOTE
This is a 24 y.o. male with Sickle Cell Diseasewho underwent automated red blood cell exchange (RCE) with 9 RBC units with target HgbS 28% and target HCT 28%.     I saw and evaluated the patient during the RCE.  The patient was resting in bed.  Vascular access functioned well.    WBC   Date/Time Value Ref Range Status   12/23/2024 07:50 AM 13.4 (H) 4.4 - 11.3 x10*3/uL Final     Hemoglobin   Date/Time Value Ref Range Status   12/23/2024 07:50 AM 8.1 (L) 13.5 - 17.5 g/dL Final     Hematocrit   Date/Time Value Ref Range Status   12/23/2024 07:50 AM 22.2 (L) 41.0 - 52.0 % Final     Platelets   Date/Time Value Ref Range Status   12/23/2024 07:50  150 - 450 x10*3/uL Final        POCT Calcium, Ionized   Date/Time Value Ref Range Status   12/22/2024 09:51 AM 1.13 1.1 - 1.33 mmol/L Final     Comment:     The performance characteristics of ionized calcium tested  in heparinized plasma or serum have been validated by the  individual  laboratory site where testing is performed.   Testing on heparinized plasma or serum is not approved by   the FDA; however, such approval is not necessary.        Hemoglobin S   Date/Time Value Ref Range Status   12/22/2024 09:51 AM 85.1 (H) <=0.0 % Preliminary        Ferritin   Date/Time Value Ref Range Status   11/27/2024 10:58  20 - 300 ng/mL Final        Pre-procedure vital signs at 12:18:  T: 37.2 C, HR: 74, RR: 18, BP: 120/55  Pulse oximetry: 97% on 2L NC    Post-procedure vital signs at 14:21:  T: 37.1 C, HR: 71, RR: 18, BP: 129/62  Pulse oximetry: 100% on 2L NC    Outcome: RCE completed without complications.  Adverse Reaction: No      Assessment and Plan:  24 y.o. male with Sickle Cell Disease who underwent RCE.    Draw post-procedure labs  Vascular access to be discontinued  by the clinical team post RCE completion.  No further RCE planned for this admission

## 2024-12-23 NOTE — CARE PLAN
The clinical goals for the shift include pt will remain HDS and VSS through shift 12/23/2024 0700      Problem: Pain - Adult  Goal: Verbalizes/displays adequate comfort level or baseline comfort level  Outcome: Progressing     Problem: Safety - Adult  Goal: Free from fall injury  Outcome: Progressing     Problem: Discharge Planning  Goal: Discharge to home or other facility with appropriate resources  Outcome: Progressing     Problem: Pain  Goal: Takes deep breaths with improved pain control throughout the shift  Outcome: Progressing  Goal: Turns in bed with improved pain control throughout the shift  Outcome: Progressing  Goal: Walks with improved pain control throughout the shift  Outcome: Progressing  Goal: Performs ADL's with improved pain control throughout shift  Outcome: Progressing

## 2024-12-23 NOTE — CARE PLAN
The patient's goals for the shift include      The clinical goals for the shift include pt will remain HDS and VSS through end of shift 12/23/24 @ 1900      Problem: Pain - Adult  Goal: Verbalizes/displays adequate comfort level or baseline comfort level  Outcome: Progressing     Problem: Safety - Adult  Goal: Free from fall injury  Outcome: Progressing     Problem: Discharge Planning  Goal: Discharge to home or other facility with appropriate resources  Outcome: Progressing     Problem: Pain  Goal: Takes deep breaths with improved pain control throughout the shift  Outcome: Progressing  Goal: Turns in bed with improved pain control throughout the shift  Outcome: Progressing  Goal: Walks with improved pain control throughout the shift  Outcome: Progressing  Goal: Performs ADL's with improved pain control throughout shift  Outcome: Progressing

## 2024-12-23 NOTE — PROGRESS NOTES
Joellen Narayan [id number]       Procedure type: Red cell Exchange    Replacement Fluids:  9units of PRBC used for procedure (RBCX)     Procedure Completed Without complications.    Attending notified of completion status. See flow sheet(s) for additional details.  Post-Vitals listed below.    Post-procedure vitals:  Vitals  BP: 129/62  Temp: 37.1 °C (98.8 °F)  Heart Rate: 71  Resp: 18  SpO2: 100 % on 4L O2 via NC       Ramiro Suarez RN

## 2024-12-23 NOTE — PRE-PROCEDURE NOTE
Interventional Radiology Preprocedure Note    Indication for procedure: The encounter diagnosis was Sickle cell pain crisis (Multi).    Relevant review of systems: NA    Relevant Labs:   Lab Results   Component Value Date    CREATININE 0.69 12/22/2024    EGFR >90 12/22/2024    INR 1.3 (H) 12/22/2024    PROTIME 14.3 (H) 12/22/2024       Planned Sedation/Anesthesia: Moderate    Airway assessment: normal    Directed physical examination:    Physical Exam  Constitutional:       General: He is not in acute distress.     Appearance: He is normal weight.   Pulmonary:      Effort: No respiratory distress.   Neurological:      Mental Status: He is alert.   Psychiatric:         Mood and Affect: Mood normal.         Behavior: Behavior normal.           Mallampati: II (hard and soft palate, upper portion of tonsils and uvula visible)    ASA Score: ASA 3 - Patient with moderate systemic disease with functional limitations    Benefits, risks and alternatives of procedure and planned sedation have been discussed with the patient and/or their representative. All questions answered and they agree to proceed.

## 2024-12-23 NOTE — POST-PROCEDURE NOTE
Interventional Radiology Brief Postprocedure Note    Attending: Santy Shaw    Assistant: Jersey Hobson    Diagnosis: Sickle cell crisis    Description of procedure: Successful placement of right internal jugular apheresis catheter. See radiology report for full detail.      Anesthesia:  MAC Moderate    Complications: None    Estimated Blood Loss: minimal    Medications  As of 12/23/24 0908      HYDROmorphone (Dilaudid) injection 1 mg (mg) Total dose:  4 mg      Date/Time Rate/Dose/Volume Action       12/22/24  0358 1 mg Given      0435 1 mg Given      0454 1 mg Given      Canceled Entry      0541 1 mg Given               HYDROmorphone (Dilaudid) injection 1 mg (mg) Total dose:  1 mg Dosing weight:  77.1      Date/Time Rate/Dose/Volume Action       12/22/24  0816 1 mg Given               HYDROmorphone (Dilaudid) injection 1 mg (mg) Total dose:  1 mg Dosing weight:  77.1      Date/Time Rate/Dose/Volume Action       12/22/24  1345 1 mg Given               ondansetron (Zofran) injection 4 mg (mg) Total dose:  4 mg      Date/Time Rate/Dose/Volume Action       12/22/24  0358 4 mg Given               ondansetron (Zofran) injection 4 mg (mg) Total dose:  4 mg Dosing weight:  77.1      Date/Time Rate/Dose/Volume Action       12/22/24  1221 4 mg Given               methocarbamol (Robaxin) injection 1,000 mg (mg) Total dose:  1,000 mg      Date/Time Rate/Dose/Volume Action       12/22/24  0433 1,000 mg (over 5 min) Given               sodium chloride 0.9 % bolus 1,000 mL (mL/hr) Total volume:  Not documented*   *Total volume has not been documented. View each administration to see the amount administered.     Date/Time Rate/Dose/Volume Action       12/22/24  0433 1,000 mL - 2,000 mL/hr (over 30 min) New Bag      0504  (over 30 min) Stopped               meropenem (Merrem) 1 g in sodium chloride 0.9% IV 50 mL (mL/hr) Total volume:  Not documented* Dosing weight:  77.1   *Total volume has not been documented. View  each administration to see the amount administered.     Date/Time Rate/Dose/Volume Action       12/22/24  0538 1 g - 100 mL/hr (over 30 min) New Bag      0616  (over 30 min) Stopped               vancomycin (Vancocin) 1,000 mg in dextrose 5%  mL (mL/hr) Total volume:  Not documented* Dosing weight:  77.1   *Total volume has not been documented. View each administration to see the amount administered.     Date/Time Rate/Dose/Volume Action       12/22/24  0621 1,000 mg - 200 mL/hr (over 60 min) New Bag      0824  (over 60 min) Stopped               morphine injection 8 mg (mg) Total dose:  8 mg Dosing weight:  77.1      Date/Time Rate/Dose/Volume Action       12/22/24  0621 8 mg Given               iohexol (OMNIPaque) 350 mg iodine/mL solution 80 mL (mL) Total volume:  100 mL Dosing weight:  77.1      Date/Time Rate/Dose/Volume Action       12/22/24  0640 100 mL Given               ketorolac (Toradol) injection 15 mg (mg) Total dose:  60 mg Dosing weight:  77.1      Date/Time Rate/Dose/Volume Action       12/22/24  0826 15 mg Given      1345 15 mg Given      2001 15 mg Given     12/23/24  0240 15 mg Given               pantoprazole (ProtoNix) EC tablet 40 mg (mg) Total dose:  40 mg Dosing weight:  77.1      Date/Time Rate/Dose/Volume Action       12/22/24  0826 40 mg Given               baclofen (Lioresal) tablet 5 mg (mg) Total dose:  15 mg Dosing weight:  77.1      Date/Time Rate/Dose/Volume Action       12/22/24  1149 5 mg Given      1621 5 mg Given      2001 5 mg Given               folic acid (Folvite) tablet 1 mg (mg) Total dose:  1 mg      Date/Time Rate/Dose/Volume Action       12/22/24  1149 1 mg Given               enoxaparin (Lovenox) syringe 40 mg (mg) Total dose:  Cannot be calculated* Dosing weight:  77.1   *Administration dose not documented     Date/Time Rate/Dose/Volume Action       12/22/24  1146 *40 mg Missed     12/23/24  0000 *Not included in total Held by provider               polyethylene  glycol (Glycolax, Miralax) packet 17 g (g) Total dose:  0 g* Dosing weight:  77.1   *Administration not included in total     Date/Time Rate/Dose/Volume Action       12/22/24  1146 *17 g Missed               sennosides-docusate sodium (Promise-Colace) 8.6-50 mg per tablet 2 tablet (tablet) Total dose:  2 tablet Dosing weight:  77.1      Date/Time Rate/Dose/Volume Action       12/22/24 2001 2 tablet Given               HYDROmorphone (Dilaudid) injection 3 mg (mg) Total dose:  6 mg Dosing weight:  77.1      Date/Time Rate/Dose/Volume Action       12/22/24  1018 3 mg Given      1222 3 mg Given               piperacillin-tazobactam (Zosyn) 3.375 g in dextrose (iso) IV 50 mL (g) Total dose:  3.375 g* Dosing weight:  77.1   *From user-documented volume     Date/Time Rate/Dose/Volume Action       12/22/24  1052 3.375 g (over 30 min) New Bag      1143  (over 30 min) Stopped      1621 3.375 g (over 30 min) New Bag      1713  (over 30 min) Stopped      2203 3.375 g (over 30 min) New Bag      2244 50 mL Stopped     12/23/24  0508 3.375 g (over 30 min) New Bag      0557  (over 30 min) Stopped               prochlorperazine (Compazine) injection 5 mg (mg) Total dose:  5 mg Dosing weight:  77.1      Date/Time Rate/Dose/Volume Action       12/22/24  1345 5 mg Given               HYDROmorphone PF (Dilaudid) injection 4 mg (mg) Total dose:  24 mg Dosing weight:  77.1      Date/Time Rate/Dose/Volume Action       12/22/24  1621 4 mg Given      1826 4 mg Given      2114 4 mg Given      2318 4 mg Given     12/23/24  0322 4 mg Given      0523 4 mg Given               HYDROmorphone PF (Dilaudid) injection 2 mg (mg) Total dose:  2 mg Dosing weight:  77.1      Date/Time Rate/Dose/Volume Action       12/23/24  0059 2 mg Given               acetaminophen (Tylenol) tablet 650 mg (mg) Total dose:  650 mg Dosing weight:  77.1      Date/Time Rate/Dose/Volume Action       12/22/24  2202 650 mg Given               fentaNYL PF (Sublimaze) injection  (mcg) Total dose:  150 mcg      Date/Time Rate/Dose/Volume Action       12/23/24  0831 50 mcg Given      0843 50 mcg Given      0846 50 mcg Given               midazolam (Versed) injection (mg) Total dose:  3 mg      Date/Time Rate/Dose/Volume Action       12/23/24  0831 1 mg Given      0844 1 mg Given      0846 1 mg Given                   No specimens collected      See detailed result report with images in PACS.    The patient tolerated the procedure well without incident or complication and is in stable condition.

## 2024-12-24 LAB
ALBUMIN SERPL BCP-MCNC: 4 G/DL (ref 3.4–5)
ALP SERPL-CCNC: 85 U/L (ref 33–120)
ALT SERPL W P-5'-P-CCNC: 17 U/L (ref 10–52)
ANION GAP SERPL CALC-SCNC: 12 MMOL/L (ref 10–20)
AST SERPL W P-5'-P-CCNC: 41 U/L (ref 9–39)
BASOPHILS # BLD MANUAL: 0 X10*3/UL (ref 0–0.1)
BASOPHILS NFR BLD MANUAL: 0 %
BILIRUB SERPL-MCNC: 9.4 MG/DL (ref 0–1.2)
BUN SERPL-MCNC: 5 MG/DL (ref 6–23)
CALCIUM SERPL-MCNC: 8.9 MG/DL (ref 8.6–10.6)
CHLORIDE SERPL-SCNC: 100 MMOL/L (ref 98–107)
CO2 SERPL-SCNC: 26 MMOL/L (ref 21–32)
CREAT SERPL-MCNC: 0.72 MG/DL (ref 0.5–1.3)
DACRYOCYTES BLD QL SMEAR: ABNORMAL
EGFRCR SERPLBLD CKD-EPI 2021: >90 ML/MIN/1.73M*2
EOSINOPHIL # BLD MANUAL: 0.23 X10*3/UL (ref 0–0.7)
EOSINOPHIL NFR BLD MANUAL: 1.7 %
ERYTHROCYTE [DISTWIDTH] IN BLOOD BY AUTOMATED COUNT: 21.2 % (ref 11.5–14.5)
GLUCOSE SERPL-MCNC: 79 MG/DL (ref 74–99)
HCT VFR BLD AUTO: 26.4 % (ref 41–52)
HGB BLD-MCNC: 9.4 G/DL (ref 13.5–17.5)
HGB RETIC QN: 28 PG (ref 28–38)
HOWELL-JOLLY BOD BLD QL SMEAR: PRESENT
IMM GRANULOCYTES # BLD AUTO: 0.1 X10*3/UL (ref 0–0.7)
IMM GRANULOCYTES NFR BLD AUTO: 0.7 % (ref 0–0.9)
IMMATURE RETIC FRACTION: 26.7 %
LDH SERPL L TO P-CCNC: 600 U/L (ref 84–246)
LYMPHOCYTES # BLD MANUAL: 1.38 X10*3/UL (ref 1.2–4.8)
LYMPHOCYTES NFR BLD MANUAL: 10.3 %
MCH RBC QN AUTO: 29.5 PG (ref 26–34)
MCHC RBC AUTO-ENTMCNC: 35.6 G/DL (ref 32–36)
MCV RBC AUTO: 83 FL (ref 80–100)
MONOCYTES # BLD MANUAL: 1.05 X10*3/UL (ref 0.1–1)
MONOCYTES NFR BLD MANUAL: 7.8 %
NEUTS SEG # BLD MANUAL: 10.75 X10*3/UL (ref 1.2–7)
NEUTS SEG NFR BLD MANUAL: 80.2 %
NRBC BLD-RTO: 4 /100 WBCS (ref 0–0)
PAPPENHEIMER BOD BLD QL SMEAR: PRESENT
PLATELET # BLD AUTO: 166 X10*3/UL (ref 150–450)
POLYCHROMASIA BLD QL SMEAR: ABNORMAL
POTASSIUM SERPL-SCNC: 4 MMOL/L (ref 3.5–5.3)
PROT SERPL-MCNC: 5.9 G/DL (ref 6.4–8.2)
RBC # BLD AUTO: 3.19 X10*6/UL (ref 4.5–5.9)
RBC MORPH BLD: ABNORMAL
RETICS #: 0.56 X10*6/UL (ref 0.02–0.12)
RETICS/RBC NFR AUTO: 17.4 % (ref 0.5–2)
SCHISTOCYTES BLD QL SMEAR: ABNORMAL
SICKLE CELLS BLD QL SMEAR: ABNORMAL
SODIUM SERPL-SCNC: 134 MMOL/L (ref 136–145)
TARGETS BLD QL SMEAR: ABNORMAL
TOTAL CELLS COUNTED BLD: 116
WBC # BLD AUTO: 13.4 X10*3/UL (ref 4.4–11.3)

## 2024-12-24 PROCEDURE — 1170000001 HC PRIVATE ONCOLOGY ROOM DAILY

## 2024-12-24 PROCEDURE — 2500000001 HC RX 250 WO HCPCS SELF ADMINISTERED DRUGS (ALT 637 FOR MEDICARE OP)

## 2024-12-24 PROCEDURE — 2500000004 HC RX 250 GENERAL PHARMACY W/ HCPCS (ALT 636 FOR OP/ED)

## 2024-12-24 PROCEDURE — 85045 AUTOMATED RETICULOCYTE COUNT: CPT

## 2024-12-24 PROCEDURE — 83615 LACTATE (LD) (LDH) ENZYME: CPT

## 2024-12-24 PROCEDURE — 85027 COMPLETE CBC AUTOMATED: CPT

## 2024-12-24 PROCEDURE — 99223 1ST HOSP IP/OBS HIGH 75: CPT

## 2024-12-24 PROCEDURE — 99233 SBSQ HOSP IP/OBS HIGH 50: CPT

## 2024-12-24 PROCEDURE — 80053 COMPREHEN METABOLIC PANEL: CPT

## 2024-12-24 PROCEDURE — 85007 BL SMEAR W/DIFF WBC COUNT: CPT

## 2024-12-24 PROCEDURE — 84484 ASSAY OF TROPONIN QUANT: CPT

## 2024-12-24 RX ORDER — HYDROMORPHONE HYDROCHLORIDE 1 MG/ML
1 INJECTION, SOLUTION INTRAMUSCULAR; INTRAVENOUS; SUBCUTANEOUS ONCE AS NEEDED
Status: COMPLETED | OUTPATIENT
Start: 2024-12-24 | End: 2024-12-24

## 2024-12-24 RX ORDER — MORPHINE SULFATE 4 MG/ML
6 INJECTION INTRAVENOUS EVERY 2 HOUR PRN
Status: DISCONTINUED | OUTPATIENT
Start: 2024-12-24 | End: 2024-12-24

## 2024-12-24 RX ORDER — MORPHINE SULFATE 4 MG/ML
8 INJECTION INTRAVENOUS EVERY 2 HOUR PRN
Status: DISCONTINUED | OUTPATIENT
Start: 2024-12-24 | End: 2024-12-25

## 2024-12-24 RX ADMIN — BACLOFEN 5 MG: 10 TABLET ORAL at 09:12

## 2024-12-24 RX ADMIN — MORPHINE SULFATE 8 MG: 4 INJECTION INTRAVENOUS at 18:25

## 2024-12-24 RX ADMIN — MORPHINE SULFATE 6 MG: 4 INJECTION INTRAVENOUS at 09:12

## 2024-12-24 RX ADMIN — KETOROLAC TROMETHAMINE 15 MG: 15 INJECTION, SOLUTION INTRAMUSCULAR; INTRAVENOUS at 20:55

## 2024-12-24 RX ADMIN — HYDROMORPHONE HYDROCHLORIDE 1 MG: 1 INJECTION, SOLUTION INTRAMUSCULAR; INTRAVENOUS; SUBCUTANEOUS at 02:47

## 2024-12-24 RX ADMIN — SENNOSIDES AND DOCUSATE SODIUM 2 TABLET: 50; 8.6 TABLET ORAL at 20:55

## 2024-12-24 RX ADMIN — FOLIC ACID 1 MG: 1 TABLET ORAL at 09:12

## 2024-12-24 RX ADMIN — PIPERACILLIN SODIUM AND TAZOBACTAM SODIUM 3.38 G: 3; .375 INJECTION, SOLUTION INTRAVENOUS at 04:33

## 2024-12-24 RX ADMIN — BACLOFEN 5 MG: 10 TABLET ORAL at 20:55

## 2024-12-24 RX ADMIN — KETOROLAC TROMETHAMINE 15 MG: 15 INJECTION, SOLUTION INTRAMUSCULAR; INTRAVENOUS at 13:24

## 2024-12-24 RX ADMIN — POLYETHYLENE GLYCOL 3350 17 G: 17 POWDER, FOR SOLUTION ORAL at 09:12

## 2024-12-24 RX ADMIN — PANTOPRAZOLE SODIUM 40 MG: 40 TABLET, DELAYED RELEASE ORAL at 06:52

## 2024-12-24 RX ADMIN — MORPHINE SULFATE 8 MG: 4 INJECTION INTRAVENOUS at 16:07

## 2024-12-24 RX ADMIN — HYDROMORPHONE HYDROCHLORIDE 1 MG: 1 INJECTION, SOLUTION INTRAMUSCULAR; INTRAVENOUS; SUBCUTANEOUS at 04:42

## 2024-12-24 RX ADMIN — MORPHINE SULFATE 8 MG: 4 INJECTION INTRAVENOUS at 23:15

## 2024-12-24 RX ADMIN — MORPHINE SULFATE 6 MG: 4 INJECTION INTRAVENOUS at 13:24

## 2024-12-24 RX ADMIN — PIPERACILLIN SODIUM AND TAZOBACTAM SODIUM 3.38 G: 3; .375 INJECTION, SOLUTION INTRAVENOUS at 16:27

## 2024-12-24 RX ADMIN — MORPHINE SULFATE 8 MG: 4 INJECTION INTRAVENOUS at 20:55

## 2024-12-24 RX ADMIN — BACLOFEN 5 MG: 10 TABLET ORAL at 16:08

## 2024-12-24 RX ADMIN — MORPHINE SULFATE 6 MG: 4 INJECTION INTRAVENOUS at 11:15

## 2024-12-24 RX ADMIN — PIPERACILLIN SODIUM AND TAZOBACTAM SODIUM 3.38 G: 3; .375 INJECTION, SOLUTION INTRAVENOUS at 09:12

## 2024-12-24 RX ADMIN — Medication: at 06:09

## 2024-12-24 RX ADMIN — KETOROLAC TROMETHAMINE 15 MG: 15 INJECTION, SOLUTION INTRAMUSCULAR; INTRAVENOUS at 09:18

## 2024-12-24 RX ADMIN — PIPERACILLIN SODIUM AND TAZOBACTAM SODIUM 3.38 G: 3; .375 INJECTION, SOLUTION INTRAVENOUS at 23:15

## 2024-12-24 RX ADMIN — KETOROLAC TROMETHAMINE 15 MG: 15 INJECTION, SOLUTION INTRAMUSCULAR; INTRAVENOUS at 02:47

## 2024-12-24 ASSESSMENT — COGNITIVE AND FUNCTIONAL STATUS - GENERAL
DAILY ACTIVITIY SCORE: 24
MOBILITY SCORE: 24

## 2024-12-24 ASSESSMENT — PAIN SCALES - GENERAL
PAINLEVEL_OUTOF10: 10 - WORST POSSIBLE PAIN
PAINLEVEL_OUTOF10: 9
PAINLEVEL_OUTOF10: 8
PAINLEVEL_OUTOF10: 9
PAINLEVEL_OUTOF10: 9
PAINLEVEL_OUTOF10: 8
PAINLEVEL_OUTOF10: 10 - WORST POSSIBLE PAIN
PAINLEVEL_OUTOF10: 10 - WORST POSSIBLE PAIN
PAINLEVEL_OUTOF10: 9
PAINLEVEL_OUTOF10: 10 - WORST POSSIBLE PAIN
PAINLEVEL_OUTOF10: 9
PAINLEVEL_OUTOF10: 8
PAINLEVEL_OUTOF10: 9
PAINLEVEL_OUTOF10: 10 - WORST POSSIBLE PAIN

## 2024-12-24 ASSESSMENT — ACTIVITIES OF DAILY LIVING (ADL): LACK_OF_TRANSPORTATION: NO

## 2024-12-24 ASSESSMENT — ENCOUNTER SYMPTOMS
FATIGUE: 1
NAUSEA: 1

## 2024-12-24 NOTE — PROGRESS NOTES
12/24/24 1400   Discharge Planning   Living Arrangements Parent   Support Systems Parent;Family members   Assistance Needed none   Type of Residence Private residence   Number of Stairs to Enter Residence 5   Number of Stairs Within Residence 12   Do you have animals or pets at home? No   Who is requesting discharge planning? Provider   Home or Post Acute Services None   Expected Discharge Disposition Home   Does the patient need discharge transport arranged? No   Financial Resource Strain   How hard is it for you to pay for the very basics like food, housing, medical care, and heating? Not hard   Housing Stability   In the last 12 months, was there a time when you were not able to pay the mortgage or rent on time? N   In the past 12 months, how many times have you moved where you were living? 1   At any time in the past 12 months, were you homeless or living in a shelter (including now)? N   Transportation Needs   In the past 12 months, has lack of transportation kept you from medical appointments or from getting medications? no   In the past 12 months, has lack of transportation kept you from meetings, work, or from getting things needed for daily living? No     Care Transitions Note    Plan per Medical/Surgical Team: Patient admitted with Sickle Cell Pain Crisis   Status: Inpatient   Payor Source: [employer]   Discharge disposition: Home   Expected date of discharge: Unknown at this time   Barriers: None at this time   PCP / Primary Oncologist: Renee Barrera MD       TCC spoke with patient at bedside that is currently active with [facility] for Home O2. No additional home going needs at this time. Demographics and insurance verifed with patient. Will continue to follow for any discharge planning needs. MARIELA Gaming, TCC

## 2024-12-24 NOTE — CARE PLAN
The patient's goals for the shift include      The clinical goals for the shift include patient will rate pain less than 8/10 this year 12/24/24 6030      Problem: Pain - Adult  Goal: Verbalizes/displays adequate comfort level or baseline comfort level  Outcome: Progressing     Problem: Safety - Adult  Goal: Free from fall injury  Outcome: Progressing     Problem: Discharge Planning  Goal: Discharge to home or other facility with appropriate resources  Outcome: Progressing     Problem: Pain  Goal: Takes deep breaths with improved pain control throughout the shift  Outcome: Progressing  Goal: Turns in bed with improved pain control throughout the shift  Outcome: Progressing  Goal: Walks with improved pain control throughout the shift  Outcome: Progressing  Goal: Performs ADL's with improved pain control throughout shift  Outcome: Progressing

## 2024-12-24 NOTE — PROGRESS NOTES
"Joellen Narayan is a 24 y.o. male on day 1 of admission presenting with Sickle cell pain crisis (Multi).    Subjective   Seen pt at bedside after red blood exchange. Pt states that his pain has improved some from yesterday. He rates pain 7/10, located \"everywhere\". He reports no more fevers today (was febrile overnight). Also reports issues with voiding; last BM 12/23. Pt also denies any swelling. Denies V/C, bleeding, swelling, SOB, H/A, cough and vision changes.        Objective     Physical Exam  Vitals reviewed.   Constitutional:       Appearance: Normal appearance.   HENT:      Head: Normocephalic and atraumatic.      Nose: Nose normal.      Mouth/Throat:      Mouth: Mucous membranes are moist.      Pharynx: Oropharynx is clear.   Eyes:      Extraocular Movements: Extraocular movements intact.      Pupils: Pupils are equal, round, and reactive to light.   Cardiovascular:      Rate and Rhythm: Normal rate and regular rhythm.      Pulses: Normal pulses.      Heart sounds: Normal heart sounds.   Pulmonary:      Effort: Pulmonary effort is normal.      Breath sounds: Normal breath sounds.   Abdominal:      General: Bowel sounds are normal.      Palpations: Abdomen is soft.   Musculoskeletal:         General: Normal range of motion.   Skin:     General: Skin is warm.   Neurological:      General: No focal deficit present.      Mental Status: He is alert and oriented to person, place, and time. Mental status is at baseline.   Psychiatric:         Mood and Affect: Mood normal.         Behavior: Behavior normal.         Last Recorded Vitals  Blood pressure 134/79, pulse 82, temperature 36.8 °C (98.2 °F), temperature source Temporal, resp. rate 22, height 1.88 m (6' 2.02\"), weight 77.1 kg (170 lb), SpO2 95%.  Intake/Output last 3 Shifts:  I/O last 3 completed shifts:  In: 2963 (38.4 mL/kg) [Other:2813; IV Piggyback:150]  Out: 2944 (38.2 mL/kg) [Urine:150 (0.1 mL/kg/hr); Other:2794]  Weight: 77.1 kg     Relevant " Results  Scheduled medications  baclofen, 5 mg, oral, TID  enoxaparin, 40 mg, subcutaneous, q24h  folic acid, 1 mg, oral, Daily  ketorolac, 15 mg, intravenous, q6h REYNALDO  pantoprazole, 40 mg, oral, Daily before breakfast  piperacillin-tazobactam, 3.375 g, intravenous, q6h  polyethylene glycol, 17 g, oral, Daily  sennosides-docusate sodium, 2 tablet, oral, Nightly      Continuous medications  dextrose 5%-0.45 % sodium chloride, 75 mL/hr, Last Rate: 75 mL/hr (12/23/24 1858)      PRN medications  PRN medications: diphenhydrAMINE, HYDROmorphone, ondansetron, prochlorperazine, pseudoephedrine   This patient has a central line   Reason for the central line remaining today? Hemodynamic monitoring            Assessment/Plan   Assessment & Plan  Sickle cell pain crisis (Multi)    Joellen Narayan is a 23 y.o. 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 CTPE (12/22) neg for PE, showing bilat GGO with new focal nodular opacity in posterior R middle lobe. S/p IV astrid and vanc in ED, start IV zosyn q6h (12/22-current). Pt on new O2 requirements with severe chest pain, c/f ACS. Heme consulted (12/22), s/p RCExchange (12/23). DC pending pain improvement.     #c/f ACS  - pt presented to the ED (12/22) with chest pain   - pt hypoxic on admit and tachycardic, placed on 2L supp O2 -- bumped up to 3L 12/22  - CXR (12/22) neg for acute process  - CTPE (12/22) neg for PE, showing bilat GGO with new focal nodular opacity in posterior R middle lobe  - blood cultures x 2 (12/22) NGTD  - UA +1 leuks, neg nitrites (12/22)  - urine culture negative (12/22)  - covid/ flu A/B  negative (12/22)  - Strep pneumo, legionella neg (12/22)  - MRSA pending (12/22)  - Lipase 37 (12/22)  - troponin 7 (12/22)  - ACS is defined as radiographic evidence of consolidation plus fever > 38.5, New oxygen req, severe chest pain, Respiratory distress or new wheezing.  - Pt meets criteria for acute chest with new O2 requirements (3L), severe chest pain, and radiographic evidence of consolidation  - Heme consulted on admit (12/22) rec RCExchange   - exchange labs ordered 12/22  - transfusion med consulted (12/22) plan for RCExchange, 12/23  - IR order placed for 12/23 line placement  - s/p IV meropenum and IV vanc in ED  - (12/22-current) start IV zosyn 3.375 q6h for CAP coverage per attending   - s/p RCExchange (12/23)  - repeat hgb S (12/23) pending  - encourage IS     # Hgb SS with severe pain   - OARRS reviewed, no aberrancies  - No current care path  - Hgb baseline ~8.5; currently stable, no indication for simple transfusion (12/22)  - Tbili baseline fluctuates ~5-1; Tbili 9.0 on admission (12/22)  - LDH baseline ~500   on admission (12/22)  - s/p 1L IV NS in ED (12/22)  - s/p IV dilaudid 3mg q2h PRN for pain (12/22)  - start IV dilaudid 4mg q2h PRN for severe pain (12/22-current)   - start IV toradol 15mg q6h x 3 days with PPI prophy (12/22-12/24)  - Continue folic acid 1mg daily  - Hgb S (12/22) pending  - utox + opiates and cannabinoid (12/22)  - PO Zofran PRN for opioid-induced nausea, PO Benadryl PRN for opioid-induced pruritus, Bowel regimen for opioid-induced constipation with DocuSenna 2tabs BID and Miralax daily     # hx of Priapism  - most recent admission c/b worsening priapism needing urgent RCExchange (9/19)  - c/w pseudophed 60mg q8h PRN for priapism      # Recent Choledocholithiasis  - s/p ERCP 7/18/24 with a biliary sphincterotomy where sludge and stones were removed, achieving complete clearance  - Seen by gen surg 8/8/24 Dr. Dove who rec lap cholecystectomy d/t the chance of  recurrence of choledocholithiasis  - Surgery has yet to be scheduled, surgery recently cancelled on 10/29  - Tbili 9.0 on admit (12/22)  - Low threshold for CT AP or RUQ US if abdominal pain is present     # Nodular lymphocyte predominant Hodgkins lymphoma (NLPHL)   - Primary Oncologist: Dr. Stoll  - Enlarged lymph nodes noted 4/1/22  - RUQ US (11/14/22) with mildly enlarged LNs in the region of the kavin hepatis  - MRI liver (11/16/22) showed re-demonstration of bulky retroperitoneal lymphadenopathy and kavin hepatic lymphadenopathy    - (11/18/22) lymph node biopsy showed atypical lymphoid infiltrate. Reviewed by Hemepath board, insufficient for lymphoma diagnosis  - PET/CT (12/6/22) showing retroperitoneal lymphadenopathy  - Followed up with Dr. Stoll (12/16/22) with plan for surg/onc consult for large tissue bx but patient missed apt and was lost to follow up  - Requested new apt with Dr. Ronnie Marte 6/19/23, patient was not seen and lost to follow up  - CT a/p (11/28/23) increased size of retroperitoneal lymph nodes reflecting extramedullary hematopoiesis I/s/o sickle cell vs lymphoma  - Paraaortic LN bx via IR (11/30/23) consistent with NLPHL. Flow: no clonal B cell or T cell population identified, lymphocyte 95%, CD3+CD4+ 68%, CD3+CD8+ 7%, CD19+ 24%  - Elevated LDH/bili partially from sickle cell disease   - Chemotherapy (R-CHOP) was discussed with primary oncologist Dr. Stoll, and decision was made to simplify his chemotherapy to Rituxan and prednisone q3 weeks mainly due to frequent sickle cell crisis  - Current chemo regimen: Rituxan and prednisone q3 weeks (C1 1/18/24, C2 2/8/24, Missed C3 d/t sickle cell pain crisis, C4 4/4/24, C5 5/16/24, C6 6/7/24)  - Per pt no longer taking Acyclovir 400mg BID prophy   - PET CT (7/25) overall showing great response to treatment with persistent residual viable disease involving a left common iliac node  - PET/CT (11/18) showing Interval increased metabolic activity  "within upper abdominal and  bilateral inguinal lymph nodes compatible with interval worsening of lymphomatous disease  - Last FUV with Dr. Stoll 11/21, per note no current treatment and will continue with observation at this time, plan for repeat CT/PET in 3 months     # Hx of nocturnal oxygen dependence and hypoxia on room air  - Pt currently has home 2L supp O2   - Wean as able, encourage incentive spirometry  - Pulm FUV 8/8- \"Given his history of sickle cell disease, it is important to ensure he has formal sleep testing to look at desaturations and presence of sleep apnea despite not having a convincing history/body habitus typical for suspecting sleep apnea- patients with sickle cell have a higher prevalence of sleep disorders including nocturnal hypoxemia\"--> rec sleep referral for formal testing if deemed appropriate, ABG and O2 destat testing, and TTE with bubble study  - TTE 8/23 showing EF 60-65%, severely dilated LV and LA, + intrapulmonary shunting    - pt currently on 3L supp O2, wean as able  - encourage IS     DVT prophy: Lovenox subcutaneous, SCDs, encourage ambulation     DISPO:  - Full code, confirmed on admit  - Discharge pending improvement in pain and poss RCExchange  - SUSIE Narayan (Parent) [phone]    Assessment and plan as above discussed with attending physician Dr. Parnell.    I spent 60 minutes in the professional and overall care of this patient.      Brenda Ocasio, APRN-CNP      "

## 2024-12-24 NOTE — DOCUMENTATION CLARIFICATION NOTE
PATIENT:               AMARIS BLANCO  ACCT #:                  [id number]  MRN:                       [id number]  :                       2000  ADMIT DATE:       2024 3:32 AM  DISCH DATE:  RESPONDING PROVIDER #:        [id number]          PROVIDER RESPONSE TEXT:    Acute Hypoxemic Respiratory Failure    CDI QUERY TEXT:    Clarification        Instruction:    Based on your assessment of the patient and the clinical information, please provide the requested documentation by clicking on the appropriate radio button and enter any additional information if prompted.    Question: Is there a diagnosis indicative of the clinical information    When answering this query, please exercise your independent professional judgment. The fact that a question is being asked, does not imply that any particular answer is desired or expected.    The patient's clinical indicators include:  Clinical Information: 22 yo M with HbSS disease, nocturnal hypoxia 2L at night, admitted for pain crisis with acute chest.    Clinical Indicators:  Vitals per Flowsheets:   at 0333: RR 22, SpO2 88%RA; at 040: RR 34, SpO2 93% 2LNC; at 1618: RR20, SpO2 94% 3LNC   at 0810: RR 18, SpO2 97% 4LNC; at 0915: RR 18, SpO2 94% 6LNC; at 1218: RR 18, SpO2 97% 4LNC     at 1016 Oncology Consult:  ?-presenting with acute chest syndrome, as evidenced by severe chest pain, hypoxia, and bibasilar ground-glass opacities on imaging.  -intermittent oxygen use at home for nocturnal hypoxia.  -Physical exam is notable for tachycardia, hypoxia (SpO2 88% on room air), and severe pain.?    Treatment:  -supplemental O2 up to 6LNC    Risk Factors:  -HbSS with acute chest syndrome  -nocturnal hypoxia wears 2LNC at night  Options provided:  -- Acute Hypoxemic Respiratory Failure  -- No acute respiratory failure  -- Other - I will add my own diagnosis  -- Refer to Clinical Documentation Reviewer    Query created by: Shilpi Andrew on 2024 2:28  PM      Electronically signed by:  ELLIOT CARDENAS PA-C 12/24/2024 8:50 AM

## 2024-12-24 NOTE — PROGRESS NOTES
"Joellen Narayan is a 24 y.o. male on day 2 of admission presenting with Sickle cell pain crisis (Multi).    Subjective   Joellen is doing fine this morning, states his pain is still in his chest and all over. He states the IV dilaudid is not helping at all and that IV morphine helped him more last admission. We discussed changing his pain meds to IV morphine today, pt agreed. Pt also denies any swelling. Denies V/C, bleeding, swelling, SOB, H/A, cough and vision changes. ROS: A complete review of systems was performed and is negative except for as mentioned above in the HPI. Last BM 12/24.       Objective     Physical Exam  Vitals reviewed.   Constitutional:       Appearance: Normal appearance.   HENT:      Head: Normocephalic and atraumatic.      Nose: Nose normal.      Mouth/Throat:      Mouth: Mucous membranes are moist.      Pharynx: Oropharynx is clear.   Eyes:      Extraocular Movements: Extraocular movements intact.      Pupils: Pupils are equal, round, and reactive to light.   Cardiovascular:      Rate and Rhythm: Normal rate and regular rhythm.      Pulses: Normal pulses.      Heart sounds: Normal heart sounds.   Pulmonary:      Effort: Pulmonary effort is normal.      Breath sounds: Normal breath sounds.   Abdominal:      General: Bowel sounds are normal.      Palpations: Abdomen is soft.   Musculoskeletal:         General: Normal range of motion.   Skin:     General: Skin is warm.   Neurological:      General: No focal deficit present.      Mental Status: He is alert and oriented to person, place, and time. Mental status is at baseline.   Psychiatric:         Mood and Affect: Mood normal.         Behavior: Behavior normal.         Last Recorded Vitals  Blood pressure 136/73, pulse 87, temperature 36.6 °C (97.9 °F), temperature source Temporal, resp. rate 20, height 1.88 m (6' 2.02\"), weight 77.1 kg (170 lb), SpO2 95%.  Intake/Output last 3 Shifts:  I/O last 3 completed shifts:  In: 2963 (38.4 mL/kg) " [Other:2813; IV Piggyback:150]  Out: 2944 (38.2 mL/kg) [Urine:150 (0.1 mL/kg/hr); Other:2794]  Weight: 77.1 kg     Relevant Results  Scheduled medications  baclofen, 5 mg, oral, TID  enoxaparin, 40 mg, subcutaneous, q24h  folic acid, 1 mg, oral, Daily  ketorolac, 15 mg, intravenous, q6h REYNALDO  pantoprazole, 40 mg, oral, Daily before breakfast  piperacillin-tazobactam, 3.375 g, intravenous, q6h  polyethylene glycol, 17 g, oral, Daily  sennosides-docusate sodium, 2 tablet, oral, Nightly      Continuous medications  HYDROmorphone,       PRN medications  PRN medications: diphenhydrAMINE, naloxone, ondansetron, prochlorperazine, pseudoephedrine   This patient has a central line   Reason for the central line remaining today? Hemodynamic monitoring    Assessment/Plan   Assessment & Plan  Sickle cell pain crisis (Multi)    Joellen Narayan is a 23 y.o. 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 CTPE (12/22) neg for PE, showing bilat GGO with new focal nodular opacity in posterior R middle lobe. S/p IV astrid and vanc in ED, start IV zosyn q6h (12/22-current). Pt on new O2 requirements with severe chest pain, c/f ACS. Heme consulted (12/22), s/p RCExchange (12/23). DC pending pain improvement.     12/24 updates:  - changed pain meds to IV morphine    #ACS  - pt presented to the ED (12/22) with chest pain   - pt hypoxic on admit and tachycardic, placed on 2L supp O2 -- bumped up to 3L 12/22 --> weaned to RA 12/24  - CXR (12/22) neg for acute process  - CTPE (12/22) neg for PE, showing bilat GGO with new focal nodular opacity in posterior R middle lobe  -  blood cultures x 2 (12/22) NGTD  - UA +1 leuks, neg nitrites (12/22)  - urine culture negative (12/22), urine culture neg   - covid/ flu A/B negative (12/22)  - Strep pneumo, legionella neg (12/22)  - MRSA neg (12/22)  - Lipase 37 (12/22)  - troponin 7 (12/22)  - ACS is defined as radiographic evidence of consolidation plus fever > 38.5, New oxygen req, severe chest pain, Respiratory distress or new wheezing.  - Pt meets criteria for acute chest with new O2 requirements (3L), severe chest pain, and radiographic evidence of consolidation  - Heme consulted on admit (12/22) rec RCExchange   - exchange labs ordered 12/22  - transfusion med consulted (12/22) plan for RCExchange, 12/23  - IR order placed for 12/23 line placement, plan to remove today 12/24  - s/p IV meropenum and IV vanc in ED  - (12/22-current) start IV zosyn 3.375 q6h for CAP coverage per attending   - s/p RCExchange (12/23)  - repeat hgb S (12/23) pending  - encourage IS  - 12/24 yesterday pt had syncopal episode- found in the bathroom sitting on the floor, pt states he did not hit his head. Vitals normal, BS normal, brought back to bed and he was in good spirits, will continue to monitor  - Orthos pending (12/24)     # Hgb SS with severe pain   - OARRS reviewed, no aberrancies  - No current care path  - Hgb baseline ~8.5; currently stable, no indication for simple transfusion (12/22)  - Tbili baseline fluctuates ~5-1; Tbili 9.0 on admission (12/22) --> 12/24 tbili pending  - LDH baseline ~500   on admission (12/22) --> 12/24 LDH pending  - s/p 1L IV NS in ED (12/22)  - s/p IV dilaudid 3mg q2h PRN for pain (12/22)  - s/p IV dilaudid 4mg q2h PRN for severe pain (12/22-12/24)   - 12/24 overnight pt placed on dilaudid PCA with no relief, states that IV morphine helps with his pain. (12/24-current) start IVP morphine 6mg q2h PRN for severe pain  - c/w IV toradol 15mg q6h x 3 days with PPI prophy (12/22-12/24)  - Continue folic acid 1mg daily  - Hgb  S (12/22) 85.1, post exchange Hgb S pending (12/23)  - utox + opiates and cannabinoid (12/22)  - PO Zofran PRN for opioid-induced nausea, PO Benadryl PRN for opioid-induced pruritus, Bowel regimen for opioid-induced constipation with DocuSenna 2tabs BID and Miralax daily     # hx of Priapism  - most recent admission c/b worsening priapism needing urgent RCExchange (9/19)  - c/w pseudophed 60mg q8h PRN for priapism      # Recent Choledocholithiasis  - s/p ERCP 7/18/24 with a biliary sphincterotomy where sludge and stones were removed, achieving complete clearance  - Seen by gen surg 8/8/24 Dr. Dove who rec lap cholecystectomy d/t the chance of recurrence of choledocholithiasis  - Surgery has yet to be scheduled, surgery recently cancelled on 10/29  - Tbili 9.0 on admit (12/22)  - Low threshold for CT AP or RUQ US if abdominal pain is present     # Nodular lymphocyte predominant Hodgkins lymphoma (NLPHL)   - Primary Oncologist: Dr. Stoll  - Enlarged lymph nodes noted 4/1/22  - RUQ US (11/14/22) with mildly enlarged LNs in the region of the kavin hepatis  - MRI liver (11/16/22) showed re-demonstration of bulky retroperitoneal lymphadenopathy and kavin hepatic lymphadenopathy    - (11/18/22) lymph node biopsy showed atypical lymphoid infiltrate. Reviewed by Hemepath board, insufficient for lymphoma diagnosis  - PET/CT (12/6/22) showing retroperitoneal lymphadenopathy  - Followed up with Dr. Stoll (12/16/22) with plan for surg/onc consult for large tissue bx but patient missed apt and was lost to follow up  - Requested new apt with Dr. Ronnie Marte 6/19/23, patient was not seen and lost to follow up  - CT a/p (11/28/23) increased size of retroperitoneal lymph nodes reflecting extramedullary hematopoiesis I/s/o sickle cell vs lymphoma  - Paraaortic LN bx via IR (11/30/23) consistent with NLPHL. Flow: no clonal B cell or T cell population identified, lymphocyte 95%, CD3+CD4+ 68%, CD3+CD8+ 7%, CD19+ 24%  - Elevated  "LDH/bili partially from sickle cell disease   - Chemotherapy (R-CHOP) was discussed with primary oncologist Dr. Stoll, and decision was made to simplify his chemotherapy to Rituxan and prednisone q3 weeks mainly due to frequent sickle cell crisis  - Current chemo regimen: Rituxan and prednisone q3 weeks (C1 1/18/24, C2 2/8/24, Missed C3 d/t sickle cell pain crisis, C4 4/4/24, C5 5/16/24, C6 6/7/24)  - Per pt no longer taking Acyclovir 400mg BID prophy   - PET CT (7/25) overall showing great response to treatment with persistent residual viable disease involving a left common iliac node  - PET/CT (11/18) showing Interval increased metabolic activity within upper abdominal and  bilateral inguinal lymph nodes compatible with interval worsening of lymphomatous disease  - Last FUV with Dr. Stoll 11/21, per note no current treatment and will continue with observation at this time, plan for repeat CT/PET in 3 months     # Hx of nocturnal oxygen dependence and hypoxia on room air  - Pt currently has home 2L supp O2   - Wean as able, encourage incentive spirometry  - Pulm FUV 8/8- \"Given his history of sickle cell disease, it is important to ensure he has formal sleep testing to look at desaturations and presence of sleep apnea despite not having a convincing history/body habitus typical for suspecting sleep apnea- patients with sickle cell have a higher prevalence of sleep disorders including nocturnal hypoxemia\"--> rec sleep referral for formal testing if deemed appropriate, ABG and O2 destat testing, and TTE with bubble study  - TTE 8/23 showing EF 60-65%, severely dilated LV and LA, + intrapulmonary shunting    - pt currently on 3L supp O2, wean as able --> as of 12/24 on RA  - encourage IS     DVT prophy: Lovenox subcutaneous, SCDs, encourage ambulation     DISPO:  - Full code, confirmed on admit  - Discharge pending improvement in pain  - SUSIE Narayan (Parent) [phone]    Assessment and plan as above " discussed with attending physician Dr. Parnell.    I spent 60 minutes in the professional and overall care of this patient.    Nikki Couch PA-C

## 2024-12-24 NOTE — CONSULTS
Inpatient consult to Integrative Hem/Onc  Consult performed by: RAAD Cronin-CNP  Consult ordered by: Nikki Villar PA-C          Reason For Consult  Symptom management    History Of Present Illness  Joellen Narayan is a 24 y.o. 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 Integrative hematology/oncology consulted for non-pharmacological symptom management of pain.    Pt sitting up at side of bed, reporting 11/10 chest pain.    Family Hx: Father: heart disease, CA. Mother: heart disease. Brother: lung CA     Social Hx: lives in a duplex with his mother. Never , no children. Currently works as a  at [employer]  Tobacco: denies   ETOH: denies   Illicits: denies      Spiritual Hx: Congregational     Joys: reading, walking, eating, TV/movies, music - lofi        Information obtained from chart review, discussion with patient/family, and discussion with primary team.         Past Medical History  He has a past medical history of Corrosion of unspecified body region, unspecified degree (12/31/2014), Impetigo (01/04/2024), Personal history of diseases of the blood and blood-forming organs and certain disorders involving the immune mechanism, Personal history of other (healed) physical injury and trauma (01/03/2015), Personal history of other diseases of the circulatory system, Personal history of other diseases of the circulatory system, Rash and other nonspecific skin eruption (09/09/2014), and Sickle-cell disease with pain (Multi) (12/19/2023).    Surgical History  He has a past surgical  history that includes IR CVC tunneled (6/21/2022); CT guided percutaneous biopsy LYMPH node superficial (11/18/2022); and CT guided percutaneous abdominal retroperitoneum biopsy (11/30/2023).     Social History  He reports that he quit smoking about a year ago. His smoking use included cigars. He has been exposed to tobacco smoke. He has never used smokeless tobacco. He reports current drug use. Drug: Marijuana. He reports that he does not drink alcohol.    Family History  Family History   Problem Relation Name Age of Onset    Sickle cell trait Mother      Sickle cell trait Father      Lung cancer Brother          Allergies  Cefepime, Amoxicillin, and Ceftriaxone    Review of Systems   Constitutional:  Positive for fatigue.   Cardiovascular:  Positive for chest pain.   Gastrointestinal:  Positive for nausea.   All other systems reviewed and are negative.       Physical Exam  Vitals and nursing note reviewed.   Constitutional:       General: He is not in acute distress.     Appearance: Normal appearance. He is normal weight. He is ill-appearing.   HENT:      Head: Normocephalic and atraumatic.      Nose: Nose normal.      Mouth/Throat:      Mouth: Mucous membranes are moist.   Eyes:      Extraocular Movements: Extraocular movements intact.      Pupils: Pupils are equal, round, and reactive to light.   Cardiovascular:      Rate and Rhythm: Normal rate.   Pulmonary:      Effort: Pulmonary effort is normal.   Abdominal:      General: Abdomen is flat.   Skin:     General: Skin is warm and dry.   Neurological:      General: No focal deficit present.      Mental Status: He is alert and oriented to person, place, and time. Mental status is at baseline.   Psychiatric:         Mood and Affect: Mood normal.         Behavior: Behavior normal.         Thought Content: Thought content normal.         Judgment: Judgment normal.          Last Recorded Vitals  Blood pressure 136/73, pulse 87, temperature 36.6 °C (97.9 °F),  "temperature source Temporal, resp. rate 20, height 1.88 m (6' 2.02\"), weight 77.1 kg (170 lb), SpO2 95%.    Relevant Results  Scheduled medications  baclofen, 5 mg, oral, TID  enoxaparin, 40 mg, subcutaneous, q24h  folic acid, 1 mg, oral, Daily  ketorolac, 15 mg, intravenous, q6h REYNALDO  pantoprazole, 40 mg, oral, Daily before breakfast  piperacillin-tazobactam, 3.375 g, intravenous, q6h  polyethylene glycol, 17 g, oral, Daily  sennosides-docusate sodium, 2 tablet, oral, Nightly      Continuous medications     PRN medications  PRN medications: diphenhydrAMINE, morphine, naloxone, ondansetron, prochlorperazine, pseudoephedrine    Results for orders placed or performed during the hospital encounter of 12/22/24 (from the past 24 hours)   CBC   Result Value Ref Range    WBC 8.1 4.4 - 11.3 x10*3/uL    nRBC 7.1 (H) 0.0 - 0.0 /100 WBCs    RBC 3.13 (L) 4.50 - 5.90 x10*6/uL    Hemoglobin 9.4 (L) 13.5 - 17.5 g/dL    Hematocrit 26.8 (L) 41.0 - 52.0 %    MCV 86 80 - 100 fL    MCH 30.0 26.0 - 34.0 pg    MCHC 35.1 32.0 - 36.0 g/dL    RDW 20.1 (H) 11.5 - 14.5 %    Platelets 165 150 - 450 x10*3/uL   Calcium, Ionized   Result Value Ref Range    POCT Calcium, Ionized 1.14 1.1 - 1.33 mmol/L   HEMOGLOBIN IDENTIFICATION WITH PATH REVIEW   Result Value Ref Range    Hemoglobin A      Hemoglobin F      Hemoglobin S 30.9 (H) <=0.0 %    Hemoglobin A2      Hemoglobin Identification Interpretation      Pathologist Review-Hemoglobin Identification     POCT GLUCOSE   Result Value Ref Range    POCT Glucose 94 74 - 99 mg/dL     *Note: Due to a large number of results and/or encounters for the requested time period, some results have not been displayed. A complete set of results can be found in Results Review.     IR CVC nontunneled    Result Date: 12/23/2024  Interpreted By:  Santy Shaw and Liller Gregory STUDY: IR CVC NONTUNNELED;  12/23/2024 9:01 am   INDICATION: Signs/Symptoms:apheresis line placement for urgent red cell exchange.   " COMPARISON: CT angio chest 12/22/2024.   ACCESSION NUMBER(S): [id number]   ORDERING CLINICIAN: ELLIOT CARDENAS   TECHNIQUE: INTERVENTIONALIST(S): Santy Shaw and Jersey Hobson   CONSENT: The patient/patient's POA/next of kin was informed of the nature of the proposed procedure. The purposes, alternatives, risks, and benefits were explained and discussed. All questions were answered and consent was obtained.   RADIATION EXPOSURE: Fluoroscopy time: 0.8 min. Dose: 3.19 mGy. Dose Area Product (DAP): 953   SEDATION: Moderate conscious IV sedation services (supervision of administration, induction, and maintenance) were provided by the physician performing the procedure with intravenous fentanyl 150 mcg and versed 3 mg for 20 minutes. The physician was assisted by an independent trained observer, an interventional radiology nurse, in the continuous monitoring of patient level of consciousness and physiologic status.   MEDICATION/CONTRAST: No additional   TIME OUT: A time out was performed immediately prior to procedure start with the interventional team, correctly identifying the patient name, date of birth, MRN, procedure, anatomy (including marking of site and side), patient position, procedure consent form, relevant laboratory and imaging test results, antibiotic administration, safety precautions, and procedure-specific equipment needs.   COMPLICATIONS: No immediate adverse events identified.   FINDINGS: The patient was positioned supine on the angiography table. The right  supraclavicular cutaneous tissues were prepared and draped in sterile manner.  1% lidocaine local anesthesia was instilled into the subcutaneous soft tissues at the selected access site for local anesthesia. Ultrasound images demonstrate a patent  right  internal jugular vein. Utilizing direct ultrasound guidance and micropuncture/Seldinger technique, the  right  internal jugular vein was accessed. An ultrasound digital spot image was  acquired and stored on the  PACS. After confirmation of location, a 018 [place]-Mandrel guidewire was introduced and advanced into the inferior vena cava utilizing intermittent fluoroscopy.   The needle was removed with the guidewire left in place and exchanged for a 5-Irish coaxial dilator.  The inner dilator and wire were removed and a J-tipped 035 guidewire was introduced through the access sheath.  The skin tract was dilated with successive increase in size of vascular dilators under direct fluoroscopic visualization.   Subsequently, a temporary 12 Irish x 16 cm catheter was advanced with its tip overlying the cavoatrial junction under direct fluoroscopic guidance.  The catheter ports were aspirated and flushed with normal saline and charged with heparin.  The external portions of the catheter were secured in place with sterile suture dressings.   The patient tolerated the procedure without complication.       1.  Technically uneventful placement of a   right  internal jugular temporary dialysis catheter under direct ultrasound and fluoroscopic guidance - optimal catheter tip position at the right atrial superior vena caval junction and the catheter is ready for utilization.   I was present for and/or performed the critical portions of the procedure and immediately available throughout the entire procedure.   I personally reviewed the image(s) / study and resident interpretation. I agree with the findings as stated.   Dictated at [facility].   MACRO: None   Signed by: Santy Shaw 12/23/2024 9:52 AM Dictation workstation:   [id number]    ECG 12 lead    Result Date: 12/22/2024  Normal sinus rhythm Nonspecific ST and T wave abnormality Abnormal ECG When compared with ECG of 29-SEP-2024 22:20, Nonspecific T wave abnormality now evident in Inferior leads Nonspecific T wave abnormality now evident in Lateral leads See ED provider note for full interpretation  and clinical correlation Confirmed by Dipti Murrieta ([id number]) on 12/22/2024 10:01:14 PM    CT angio chest for pulmonary embolism    Result Date: 12/22/2024  Interpreted By:  Ed Wren and Nakamoto Kent STUDY: CT ANGIO CHEST FOR PULMONARY EMBOLISM;  12/22/2024 6:50 am   INDICATION: Signs/Symptoms:tachycardic, hypoxic, chest pain, hx sickle cell.     COMPARISON: CTA chest 08/23/2024   ACCESSION NUMBER(S): [id number]   ORDERING CLINICIAN: RICCO HOLLIS   TECHNIQUE: Helical data acquisition of the chest was obtained after intravenous administration of 100 ML Omnipaque 350, as per PE protocol. Images were reformatted in coronal and sagittal planes. Axial and coronal maximum intensity projection (MIP) images were created and reviewed.   FINDINGS: POTENTIAL LIMITATIONS OF THE STUDY: The assessment is limited by respiratory motion and suboptimal contrast opacification of pulmonary arteries.   HEART AND VESSELS: No discrete filling defects within the main pulmonary artery or its branches to segmental level. Please note that, assessment of subsegmental branches is limited and small peripheral emboli are not entirely excluded. Main pulmonary artery and its branches are normal in caliber.   The thoracic aorta normal in course and caliber.There is no atherosclerosis present, including calcified and noncalcified plaques.Although, the study is not tailored for evaluation of aorta, there is no definite evidence of acute aortic pathology. No coronary artery calcifications are seen. Please note, the study is not optimized for evaluation of coronary arteries.   The cardiac chambers are not enlarged. There is no pericardial effusion seen.   MEDIASTINUM AND ANA, LOWER NECK AND AXILLA: The visualized thyroid gland is within normal limits. No evidence of thoracic lymphadenopathy by CT criteria. Prominent thymic tissue is noted which is consistent with patient age. Esophagus appears within normal limits as seen.   LUNGS AND  AIRWAYS: The trachea and central airways are patent. No endobronchial lesion is seen.   The bilateral lungs are without evidence of pneumothorax. Bibasilar dependent ground-glass opacities which are new from prior exam. There is also a focal nodular opacity within the posterior portion of the right middle lobe which is new.   UPPER ABDOMEN: Hyperdense noted within the posterior dependent portions of the gallbladder without gallbladder wall thickening. Atrophic appearing spleen. Stomach appears markedly distended with contents. No pericholecystic fluid.   CHEST WALL AND OSSEOUS STRUCTURES: Chest wall is within normal limits. No acute osseous pathology.There are no suspicious osseous lesions.       1. No evidence of acute pulmonary embolism to segmental level. Please note that, assessment of subsegmental branches is limited and small peripheral emboli are not entirely excluded. 2. Bibasilar dependent ground-glass opacities that may represent atelectasis, however acute chest syndrome is in the differential in the appropriate clinical setting. In addition, there is a new focal nodular opacity in the posterior right middle lobe which is suspicious for infectious/inflammatory etiology. 3. Cholelithiasis. 4. Additional findings as described above.   I personally reviewed the images/study and I agree with the findings as stated by Gil Bonilla MD. This study was interpreted at [facility], [place], [place].   MACRO: None   Signed by: Ed Wren 12/22/2024 8:34 AM Dictation workstation:   [id number]    XR chest 2 views    Result Date: 12/22/2024  Interpreted By:  Maykel Harmon, STUDY: XR CHEST 2 VIEWS;  12/22/2024 4:23 am   INDICATION: Signs/Symptoms:c/f acute chest.   COMPARISON: Chest x-ray 09/25/2024   ACCESSION NUMBER(S): [id number]   ORDERING CLINICIAN: AMBROSIO VARGAS   FINDINGS:     CARDIOMEDIASTINAL SILHOUETTE: Cardiomediastinal silhouette is stable in size and configuration.    LUNGS: No consolidation, pleural effusion or pneumothorax.   ABDOMEN: No remarkable upper abdominal findings.   BONES: No acute osseous abnormality.       No acute cardiopulmonary process.   MACRO: None   Signed by: Maykel Harmon 12/22/2024 4:56 AM Dictation workstation:   [id number]        Assessment/Plan   Introduction to Integrative Medicine:  Spoke with pt at bedside. Patient seemed to appreciate the extra layer of support.   Integrative Medicine was introduced as a service for patients with serious illness to help with symptoms through non-pharmacological management, such as mindfulness, acupuncture, and gentle bodywork. Such interventions can assist with symptoms such as anxiety, fatigue, nausea, depression and pain.     The [facility] Integrative Medicine Symptom Management program offers multi-disciplinary supervised care of cancer patients using Integrative Modalities billed to insurance using NCCN and SIO/ASCO guideline-driven practices.  ESAS is obtained prior to and after each treatment by the practitioner       Pre- Post-   Wellbeing   9  NA - no treatment today   Coping  9     Pain  11     Fatigue  9     Anxiety   3     Depression  2     Stress  2     Nausea  7            Sickle cell disease with acute on chronic pain:   pain related to vaso-occlusive crisis  Defer to primary team for adequate PO/IV pain regimen   Recommend integrative therapy modalities as pt allows:  -Acupuncture; provided pt education today. Pt declined, stating his chest pain is too severe today. Bedside RN and primary team notified.  -Acupressure, gua sha   -Gentle bodywork and stretching as tolerated        -Art therapy - consult placed   -Music therapy - consult placed   -Chaplaincy - consult placed        Nausea/Vomiting:  Intermittent nausea and vomiting related to opioids, pain  -Defer to supportive oncology recs for pharmacological management  -Recommend integrative medicine modalities as listed above        Thank you for allowing us to participate in the care of this patient. Integrative Medicine Team will continue to follow as needed.  Please contact team with any questions or concerns.           RAAD Lopez-CNP (available by [place] )  [facility]  Inpatient Integrative Medicine      I spent 80 minutes in the care of this patient which included chart review, interviewing patient/family, discussion with primary team, coordination of care, and documentation.     Medical Decision Making was high level due to high complexity of problems, extensive data review, and high risk of management/treatment.

## 2024-12-24 NOTE — PROGRESS NOTES
"Joellen Narayan is a 24 y.o. male on day 1 of admission presenting with Sickle cell pain crisis (Multi).    Subjective   Seen pt at bedside after red blood exchange. Pt states that his pain has improved some from yesterday. He rates pain 7/10, located \"everywhere\". He reports no more fevers today (was febrile overnight). Also reports issues with voiding; last BM 12/23. Pt also denies any swelling. Denies V/C, bleeding, swelling, SOB, H/A, cough and vision changes.        Objective     Physical Exam  Vitals reviewed.   Constitutional:       Appearance: Normal appearance.   HENT:      Head: Normocephalic and atraumatic.      Nose: Nose normal.      Mouth/Throat:      Mouth: Mucous membranes are moist.      Pharynx: Oropharynx is clear.   Eyes:      Extraocular Movements: Extraocular movements intact.      Pupils: Pupils are equal, round, and reactive to light.   Cardiovascular:      Rate and Rhythm: Normal rate and regular rhythm.      Pulses: Normal pulses.      Heart sounds: Normal heart sounds.   Pulmonary:      Effort: Pulmonary effort is normal.      Breath sounds: Normal breath sounds.   Abdominal:      General: Bowel sounds are normal.      Palpations: Abdomen is soft.   Musculoskeletal:         General: Normal range of motion.   Skin:     General: Skin is warm.   Neurological:      General: No focal deficit present.      Mental Status: He is alert and oriented to person, place, and time. Mental status is at baseline.   Psychiatric:         Mood and Affect: Mood normal.         Behavior: Behavior normal.         Last Recorded Vitals  Blood pressure 134/79, pulse 82, temperature 36.8 °C (98.2 °F), temperature source Temporal, resp. rate 22, height 1.88 m (6' 2.02\"), weight 77.1 kg (170 lb), SpO2 95%.  Intake/Output last 3 Shifts:  I/O last 3 completed shifts:  In: 2963 (38.4 mL/kg) [Other:2813; IV Piggyback:150]  Out: 2944 (38.2 mL/kg) [Urine:150 (0.1 mL/kg/hr); Other:2794]  Weight: 77.1 kg     Relevant " Results  Scheduled medications  baclofen, 5 mg, oral, TID  enoxaparin, 40 mg, subcutaneous, q24h  folic acid, 1 mg, oral, Daily  ketorolac, 15 mg, intravenous, q6h REYNALDO  pantoprazole, 40 mg, oral, Daily before breakfast  piperacillin-tazobactam, 3.375 g, intravenous, q6h  polyethylene glycol, 17 g, oral, Daily  sennosides-docusate sodium, 2 tablet, oral, Nightly      Continuous medications  dextrose 5%-0.45 % sodium chloride, 75 mL/hr, Last Rate: 75 mL/hr (12/23/24 1858)      PRN medications  PRN medications: diphenhydrAMINE, HYDROmorphone, ondansetron, prochlorperazine, pseudoephedrine   This patient has a central line   Reason for the central line remaining today? Hemodynamic monitoring            Assessment/Plan   Assessment & Plan  Sickle cell pain crisis (Multi)    Joellen Narayan is a 23 y.o. 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 CTPE (12/22) neg for PE, showing bilat GGO with new focal nodular opacity in posterior R middle lobe. S/p IV astrid and vanc in ED, start IV zosyn q6h (12/22-current). Pt on new O2 requirements with severe chest pain, c/f ACS. Heme consulted (12/22), s/p RCExchange (12/23). DC pending pain improvement.     #c/f ACS  - pt presented to the ED (12/22) with chest pain   - pt hypoxic on admit and tachycardic, placed on 2L supp O2 -- bumped up to 3L 12/22  - CXR (12/22) neg for acute process  - CTPE (12/22) neg for PE, showing bilat GGO with new focal nodular opacity in posterior R middle lobe  - blood cultures x 2 (12/22) NGTD  - UA +1 leuks, neg nitrites (12/22)  - Strep pneumo, legionella neg (12/22)  - MRSA  pending (12/22)  - Lipase 37 (12/22)  - troponin 7 (12/22)  - ACS is defined as radiographic evidence of consolidation plus fever > 38.5, New oxygen req, severe chest pain, Respiratory distress or new wheezing.  - Pt meets criteria for acute chest with new O2 requirements (3L), severe chest pain, and radiographic evidence of consolidation  - Heme consulted on admit (12/22) rec RCExchange   - exchange labs ordered 12/22  - transfusion med consulted (12/22) plan for RCExchange, 12/23  - IR order placed for 12/23 line placement  - s/p IV meropenum and IV vanc in ED  - (12/22-current) start IV zosyn 3.375 q6h for CAP coverage per attending   - s/p RCExchange (12/23)  - encourage IS     # Hgb SS with severe pain   - OARRS reviewed, no aberrancies  - No current care path  - Hgb baseline ~8.5; currently stable, no indication for simple transfusion (12/22)  - Tbili baseline fluctuates ~5-1; Tbili 9.0 on admission (12/22)  - LDH baseline ~500   on admission (12/22)  - s/p 1L IV NS in ED (12/22)  - s/p IV dilaudid 3mg q2h PRN for pain (12/22)  - start IV dilaudid 4mg q2h PRN for severe pain (12/22-current)   - start IV toradol 15mg q6h x 3 days with PPI prophy (12/22-12/24)  - Continue folic acid 1mg daily  - Hgb S (12/22) pending  - utox + opiates and cannabinoid (12/22)  - PO Zofran PRN for opioid-induced nausea, PO Benadryl PRN for opioid-induced pruritus, Bowel regimen for opioid-induced constipation with DocuSenna 2tabs BID and Miralax daily     # hx of Priapism  - most recent admission c/b worsening priapism needing urgent RCExchange (9/19)  - c/w pseudophed 60mg q8h PRN for priapism      # Recent Choledocholithiasis  - s/p ERCP 7/18/24 with a biliary sphincterotomy where sludge and stones were removed, achieving complete clearance  - Seen by gen surg 8/8/24 Dr. Dove who rec lap cholecystectomy d/t the chance of recurrence of choledocholithiasis  - Surgery has yet to be scheduled, surgery recently cancelled on  10/29  - Tbili 9.0 on admit (12/22)  - Low threshold for CT AP or RUQ US if abdominal pain is present     # Nodular lymphocyte predominant Hodgkins lymphoma (NLPHL)   - Primary Oncologist: Dr. Stoll  - Enlarged lymph nodes noted 4/1/22  - RUQ US (11/14/22) with mildly enlarged LNs in the region of the kavin hepatis  - MRI liver (11/16/22) showed re-demonstration of bulky retroperitoneal lymphadenopathy and kavin hepatic lymphadenopathy    - (11/18/22) lymph node biopsy showed atypical lymphoid infiltrate. Reviewed by Hemepath board, insufficient for lymphoma diagnosis  - PET/CT (12/6/22) showing retroperitoneal lymphadenopathy  - Followed up with Dr. Stoll (12/16/22) with plan for surg/onc consult for large tissue bx but patient missed apt and was lost to follow up  - Requested new apt with Dr. Ronnie Marte 6/19/23, patient was not seen and lost to follow up  - CT a/p (11/28/23) increased size of retroperitoneal lymph nodes reflecting extramedullary hematopoiesis I/s/o sickle cell vs lymphoma  - Paraaortic LN bx via IR (11/30/23) consistent with NLPHL. Flow: no clonal B cell or T cell population identified, lymphocyte 95%, CD3+CD4+ 68%, CD3+CD8+ 7%, CD19+ 24%  - Elevated LDH/bili partially from sickle cell disease   - Chemotherapy (R-CHOP) was discussed with primary oncologist Dr. Stoll, and decision was made to simplify his chemotherapy to Rituxan and prednisone q3 weeks mainly due to frequent sickle cell crisis  - Current chemo regimen: Rituxan and prednisone q3 weeks (C1 1/18/24, C2 2/8/24, Missed C3 d/t sickle cell pain crisis, C4 4/4/24, C5 5/16/24, C6 6/7/24)  - Per pt no longer taking Acyclovir 400mg BID prophy   - PET CT (7/25) overall showing great response to treatment with persistent residual viable disease involving a left common iliac node  - PET/CT (11/18) showing Interval increased metabolic activity within upper abdominal and  bilateral inguinal lymph nodes compatible with interval worsening of  "lymphomatous disease  - Last FUV with Dr. Stoll 11/21, per note no current treatment and will continue with observation at this time, plan for repeat CT/PET in 3 months     # Hx of nocturnal oxygen dependence and hypoxia on room air  - Pt currently has home 2L supp O2   - Wean as able, encourage incentive spirometry  - Pulm FUV 8/8- \"Given his history of sickle cell disease, it is important to ensure he has formal sleep testing to look at desaturations and presence of sleep apnea despite not having a convincing history/body habitus typical for suspecting sleep apnea- patients with sickle cell have a higher prevalence of sleep disorders including nocturnal hypoxemia\"--> rec sleep referral for formal testing if deemed appropriate, ABG and O2 destat testing, and TTE with bubble study  - TTE 8/23 showing EF 60-65%, severely dilated LV and LA, + intrapulmonary shunting    - pt currently on 3L supp O2, wean as able  - encourage IS     DVT prophy: Lovenox subcutaneous, SCDs, encourage ambulation     DISPO:  - Full code, confirmed on admit  - Discharge pending improvement in pain and poss RCExchange  - SUSIE Narayan (Parent) [phone]    Assessment and plan as above discussed with attending physician Dr. Parnell.    I spent 60 minutes in the professional and overall care of this patient.      Brenda Ocasio, RAAD-CNP      "

## 2024-12-24 NOTE — PROGRESS NOTES
Spiritual Care Visit  Spiritual Care Request    Reason for Visit:  Routine Visit: Follow-up  Continue Visiting: Yes     Request Received From:  Referral From: Other (Comment) (Integrative Oncology NP)    Focus of Care:  Visited With: Patient       Refer to :  Referral To:        Spiritual Care Assessment    Care Provided:  Intended Effects: Build relationship of care and support, Demonstrate caring and concern  Methods: Collaborate with care team member, Encourage sharing of feelings, Offer support  Interventions: Acknowledge current situation, Active listening, Provide hospitality    Sense of Community and or Episcopal Affiliation:  Orthodoxy      Spiritual Care Annotation    Annotation:   visited with patient Joellen Narayan. Patient and  known to one another from previous visits. Patient welcomed visit from facility [facility]. Hand  was utilized with him before and after visit.    Patient shared that he was not feeling well but noticed a good difference from when he was admitted. Patient recounted how he has been the last few months and his plans for [place]. Patient was appreciative of visit and did not have any needs at this time.  provided care through supportive conversation and hospitality. Spiritual Care remains available as needed/requested.    Rev. Diana Álvarez MDiv, BCC

## 2024-12-25 ENCOUNTER — APPOINTMENT (OUTPATIENT)
Dept: RADIOLOGY | Facility: HOSPITAL | Age: 24
DRG: 811 | End: 2024-12-25
Payer: COMMERCIAL

## 2024-12-25 LAB
ALBUMIN SERPL BCP-MCNC: 3.9 G/DL (ref 3.4–5)
ALP SERPL-CCNC: 94 U/L (ref 33–120)
ALT SERPL W P-5'-P-CCNC: 15 U/L (ref 10–52)
ANION GAP SERPL CALC-SCNC: 10 MMOL/L (ref 10–20)
AST SERPL W P-5'-P-CCNC: 32 U/L (ref 9–39)
BASOPHILS # BLD AUTO: 0.01 X10*3/UL (ref 0–0.1)
BASOPHILS NFR BLD AUTO: 0.1 %
BILIRUB SERPL-MCNC: 9.4 MG/DL (ref 0–1.2)
BUN SERPL-MCNC: 6 MG/DL (ref 6–23)
CALCIUM SERPL-MCNC: 8.9 MG/DL (ref 8.6–10.6)
CARDIAC TROPONIN I PNL SERPL HS: 7 NG/L (ref 0–53)
CHLORIDE SERPL-SCNC: 103 MMOL/L (ref 98–107)
CO2 SERPL-SCNC: 29 MMOL/L (ref 21–32)
CREAT SERPL-MCNC: 0.64 MG/DL (ref 0.5–1.3)
EGFRCR SERPLBLD CKD-EPI 2021: >90 ML/MIN/1.73M*2
EOSINOPHIL # BLD AUTO: 0.19 X10*3/UL (ref 0–0.7)
EOSINOPHIL NFR BLD AUTO: 1.6 %
ERYTHROCYTE [DISTWIDTH] IN BLOOD BY AUTOMATED COUNT: 19.8 % (ref 11.5–14.5)
GLUCOSE SERPL-MCNC: 98 MG/DL (ref 74–99)
HCT VFR BLD AUTO: 25 % (ref 41–52)
HGB BLD-MCNC: 8.8 G/DL (ref 13.5–17.5)
HGB RETIC QN: 29 PG (ref 28–38)
IMM GRANULOCYTES # BLD AUTO: 0.07 X10*3/UL (ref 0–0.7)
IMM GRANULOCYTES NFR BLD AUTO: 0.6 % (ref 0–0.9)
IMMATURE RETIC FRACTION: 17 %
LDH SERPL L TO P-CCNC: 508 U/L (ref 84–246)
LYMPHOCYTES # BLD AUTO: 1.37 X10*3/UL (ref 1.2–4.8)
LYMPHOCYTES NFR BLD AUTO: 11.3 %
MCH RBC QN AUTO: 29.2 PG (ref 26–34)
MCHC RBC AUTO-ENTMCNC: 35.2 G/DL (ref 32–36)
MCV RBC AUTO: 83 FL (ref 80–100)
MONOCYTES # BLD AUTO: 1.77 X10*3/UL (ref 0.1–1)
MONOCYTES NFR BLD AUTO: 14.6 %
NEUTROPHILS # BLD AUTO: 8.73 X10*3/UL (ref 1.2–7.7)
NEUTROPHILS NFR BLD AUTO: 71.8 %
NRBC BLD-RTO: 1.9 /100 WBCS (ref 0–0)
PLATELET # BLD AUTO: 215 X10*3/UL (ref 150–450)
POTASSIUM SERPL-SCNC: 3.6 MMOL/L (ref 3.5–5.3)
PROT SERPL-MCNC: 6.2 G/DL (ref 6.4–8.2)
RBC # BLD AUTO: 3.01 X10*6/UL (ref 4.5–5.9)
RETICS #: 0.48 X10*6/UL (ref 0.02–0.12)
RETICS/RBC NFR AUTO: 16 % (ref 0.5–2)
SODIUM SERPL-SCNC: 138 MMOL/L (ref 136–145)
WBC # BLD AUTO: 12.1 X10*3/UL (ref 4.4–11.3)

## 2024-12-25 PROCEDURE — 80053 COMPREHEN METABOLIC PANEL: CPT

## 2024-12-25 PROCEDURE — 85025 COMPLETE CBC W/AUTO DIFF WBC: CPT

## 2024-12-25 PROCEDURE — 71045 X-RAY EXAM CHEST 1 VIEW: CPT | Performed by: STUDENT IN AN ORGANIZED HEALTH CARE EDUCATION/TRAINING PROGRAM

## 2024-12-25 PROCEDURE — 93010 ELECTROCARDIOGRAM REPORT: CPT | Performed by: INTERNAL MEDICINE

## 2024-12-25 PROCEDURE — 83615 LACTATE (LD) (LDH) ENZYME: CPT

## 2024-12-25 PROCEDURE — 36415 COLL VENOUS BLD VENIPUNCTURE: CPT

## 2024-12-25 PROCEDURE — 93005 ELECTROCARDIOGRAM TRACING: CPT

## 2024-12-25 PROCEDURE — 71045 X-RAY EXAM CHEST 1 VIEW: CPT

## 2024-12-25 PROCEDURE — 85045 AUTOMATED RETICULOCYTE COUNT: CPT

## 2024-12-25 PROCEDURE — 2500000004 HC RX 250 GENERAL PHARMACY W/ HCPCS (ALT 636 FOR OP/ED)

## 2024-12-25 PROCEDURE — 99233 SBSQ HOSP IP/OBS HIGH 50: CPT

## 2024-12-25 PROCEDURE — 1170000001 HC PRIVATE ONCOLOGY ROOM DAILY

## 2024-12-25 PROCEDURE — 2500000001 HC RX 250 WO HCPCS SELF ADMINISTERED DRUGS (ALT 637 FOR MEDICARE OP)

## 2024-12-25 RX ORDER — LEVOFLOXACIN 750 MG/1
750 TABLET ORAL EVERY 24 HOURS
Status: COMPLETED | OUTPATIENT
Start: 2024-12-25 | End: 2024-12-28

## 2024-12-25 RX ORDER — MORPHINE SULFATE 4 MG/ML
10 INJECTION INTRAVENOUS EVERY 2 HOUR PRN
Status: DISCONTINUED | OUTPATIENT
Start: 2024-12-25 | End: 2024-12-26

## 2024-12-25 RX ORDER — LEVOFLOXACIN 750 MG/1
750 TABLET ORAL EVERY 24 HOURS
Qty: 3 TABLET | Refills: 0 | Status: SHIPPED | OUTPATIENT
Start: 2024-12-26 | End: 2024-12-29

## 2024-12-25 RX ADMIN — POLYETHYLENE GLYCOL 3350 17 G: 17 POWDER, FOR SOLUTION ORAL at 08:43

## 2024-12-25 RX ADMIN — SODIUM CHLORIDE, POTASSIUM CHLORIDE, SODIUM LACTATE AND CALCIUM CHLORIDE 500 ML: 600; 310; 30; 20 INJECTION, SOLUTION INTRAVENOUS at 10:17

## 2024-12-25 RX ADMIN — MORPHINE SULFATE 8 MG: 4 INJECTION INTRAVENOUS at 02:21

## 2024-12-25 RX ADMIN — KETOROLAC TROMETHAMINE 15 MG: 15 INJECTION, SOLUTION INTRAMUSCULAR; INTRAVENOUS at 01:28

## 2024-12-25 RX ADMIN — MORPHINE SULFATE 10 MG: 4 INJECTION INTRAVENOUS at 18:36

## 2024-12-25 RX ADMIN — MORPHINE SULFATE 10 MG: 4 INJECTION INTRAVENOUS at 20:36

## 2024-12-25 RX ADMIN — PIPERACILLIN SODIUM AND TAZOBACTAM SODIUM 3.38 G: 3; .375 INJECTION, SOLUTION INTRAVENOUS at 04:40

## 2024-12-25 RX ADMIN — MORPHINE SULFATE 10 MG: 4 INJECTION INTRAVENOUS at 14:32

## 2024-12-25 RX ADMIN — MORPHINE SULFATE 10 MG: 4 INJECTION INTRAVENOUS at 22:45

## 2024-12-25 RX ADMIN — BACLOFEN 5 MG: 10 TABLET ORAL at 08:43

## 2024-12-25 RX ADMIN — PANTOPRAZOLE SODIUM 40 MG: 40 TABLET, DELAYED RELEASE ORAL at 07:16

## 2024-12-25 RX ADMIN — BACLOFEN 5 MG: 10 TABLET ORAL at 20:36

## 2024-12-25 RX ADMIN — MORPHINE SULFATE 8 MG: 4 INJECTION INTRAVENOUS at 04:40

## 2024-12-25 RX ADMIN — MORPHINE SULFATE 10 MG: 4 INJECTION INTRAVENOUS at 16:34

## 2024-12-25 RX ADMIN — LEVOFLOXACIN 750 MG: 750 TABLET, FILM COATED ORAL at 10:05

## 2024-12-25 RX ADMIN — FOLIC ACID 1 MG: 1 TABLET ORAL at 08:43

## 2024-12-25 RX ADMIN — MORPHINE SULFATE 10 MG: 4 INJECTION INTRAVENOUS at 10:44

## 2024-12-25 RX ADMIN — MORPHINE SULFATE 10 MG: 4 INJECTION INTRAVENOUS at 08:43

## 2024-12-25 RX ADMIN — MORPHINE SULFATE 10 MG: 4 INJECTION INTRAVENOUS at 12:44

## 2024-12-25 RX ADMIN — MORPHINE SULFATE 8 MG: 4 INJECTION INTRAVENOUS at 06:34

## 2024-12-25 RX ADMIN — BACLOFEN 5 MG: 10 TABLET ORAL at 14:32

## 2024-12-25 ASSESSMENT — PAIN SCALES - GENERAL
PAINLEVEL_OUTOF10: 10 - WORST POSSIBLE PAIN
PAINLEVEL_OUTOF10: 10 - WORST POSSIBLE PAIN
PAINLEVEL_OUTOF10: 8
PAINLEVEL_OUTOF10: 9
PAINLEVEL_OUTOF10: 10 - WORST POSSIBLE PAIN
PAINLEVEL_OUTOF10: 9
PAINLEVEL_OUTOF10: 10 - WORST POSSIBLE PAIN
PAINLEVEL_OUTOF10: 9
PAINLEVEL_OUTOF10: 10 - WORST POSSIBLE PAIN
PAINLEVEL_OUTOF10: 9
PAINLEVEL_OUTOF10: 10 - WORST POSSIBLE PAIN
PAINLEVEL_OUTOF10: 10 - WORST POSSIBLE PAIN
PAINLEVEL_OUTOF10: 9

## 2024-12-25 ASSESSMENT — COGNITIVE AND FUNCTIONAL STATUS - GENERAL
DAILY ACTIVITIY SCORE: 24
MOBILITY SCORE: 24
DAILY ACTIVITIY SCORE: 24
MOBILITY SCORE: 24

## 2024-12-25 ASSESSMENT — PAIN - FUNCTIONAL ASSESSMENT
PAIN_FUNCTIONAL_ASSESSMENT: 0-10

## 2024-12-25 ASSESSMENT — PAIN DESCRIPTION - DESCRIPTORS: DESCRIPTORS: ACHING;CRUSHING

## 2024-12-25 NOTE — CARE PLAN
The clinical goals for the shift include pt will remain HDS and VSS throughout shift 12/25/24 by 0700.      Problem: Pain - Adult  Goal: Verbalizes/displays adequate comfort level or baseline comfort level  Outcome: Progressing     Problem: Safety - Adult  Goal: Free from fall injury  Outcome: Progressing     Problem: Discharge Planning  Goal: Discharge to home or other facility with appropriate resources  Outcome: Progressing     Problem: Pain  Goal: Takes deep breaths with improved pain control throughout the shift  Outcome: Progressing  Goal: Turns in bed with improved pain control throughout the shift  Outcome: Progressing  Goal: Walks with improved pain control throughout the shift  Outcome: Progressing  Goal: Performs ADL's with improved pain control throughout shift  Outcome: Progressing     Problem: Fall/Injury  Goal: Not fall by end of shift  Outcome: Progressing  Goal: Be free from injury by end of the shift  Outcome: Progressing  Goal: Verbalize understanding of personal risk factors for fall in the hospital  Outcome: Progressing  Goal: Verbalize understanding of risk factor reduction measures to prevent injury from fall in the home  Outcome: Progressing  Goal: Use assistive devices by end of the shift  Outcome: Progressing  Goal: Pace activities to prevent fatigue by end of the shift  Outcome: Progressing

## 2024-12-25 NOTE — PROGRESS NOTES
Child Life Assessment:   Reason for Consult  Discipline: Child Life Specialist  Reason for Consult: Coping skill development/planning  Referral Source: Ongoing  Total Time Spent (min): 15 minutes    Patient Intervention(s)  Type of Intervention Performed: Healing environment interventions  Healing Environment Intervention(s): Assessment, Coping plan implementation, Facilitate calming/relaxation    Evaluation  Evaluation/Plan of Care: Provide ongoing support, Patient/family receptive    Session Details: Certified Child Life Specialist (CCLS) familiar with patient from previous admissions.  Patient shared that he continued to have significant pain that made it difficult to lay down or sleep.  CCLS offered to go through a guided imagery script with patient, who has utilized this coping tool in the past.  Patient followed as CCLS encouraged deep breathing and listened as CCLS read through a guided imagery script.  Patient shared that guided imagery helped him to feel more relaxed, though he was still too uncomfortable to engage in much conversation.  CCLS provided additional hot packs and attempted to return again later in the day.  Patient was resting with his eyes closed in the chair.  Child life will continue to provide support to patient throughout admission.    FINESSE Stephens, CCLS  [employer] Chat

## 2024-12-25 NOTE — PROGRESS NOTES
"Joellen Narayan is a 24 y.o. male on day 3 of admission presenting with Sickle cell pain crisis (Multi).    Subjective   Joellen is doing fine this morning, states his pain is still uncontrolled. We discussed increasing his IV morphine. We also discussed repeat CXR, EKG, and troponin for continued chest pain. We discussed poor PO intake, we discussed some fluids to increased hydration. Last BM 12/24. Denies SOB, N/V/D/C, fever, chills. ROS: A complete review of systems was performed and is negative except for as mentioned above in the HPI        Objective     Physical Exam  Vitals reviewed.   Constitutional:       Appearance: Normal appearance.   HENT:      Head: Normocephalic and atraumatic.      Nose: Nose normal.      Mouth/Throat:      Mouth: Mucous membranes are moist.      Pharynx: Oropharynx is clear.   Eyes:      Extraocular Movements: Extraocular movements intact.      Pupils: Pupils are equal, round, and reactive to light.   Cardiovascular:      Rate and Rhythm: Normal rate and regular rhythm.      Pulses: Normal pulses.      Heart sounds: Normal heart sounds.   Pulmonary:      Effort: Pulmonary effort is normal.      Breath sounds: Normal breath sounds.   Abdominal:      General: Bowel sounds are normal.      Palpations: Abdomen is soft.   Musculoskeletal:         General: Normal range of motion.   Skin:     General: Skin is warm.   Neurological:      General: No focal deficit present.      Mental Status: He is alert and oriented to person, place, and time. Mental status is at baseline.   Psychiatric:         Mood and Affect: Mood normal.         Behavior: Behavior normal.         Last Recorded Vitals  Blood pressure 112/65, pulse 73, temperature 36.7 °C (98.1 °F), temperature source Temporal, resp. rate 20, height 1.88 m (6' 2.02\"), weight 77.1 kg (170 lb), SpO2 91%.  Intake/Output last 3 Shifts:  I/O last 3 completed shifts:  In: 1158 (15 mL/kg) [I.V.:1108 (14.4 mL/kg); IV Piggyback:50]  Out: - (0 " mL/kg)   Weight: 77.1 kg     Relevant Results  Scheduled medications  baclofen, 5 mg, oral, TID  enoxaparin, 40 mg, subcutaneous, q24h  folic acid, 1 mg, oral, Daily  pantoprazole, 40 mg, oral, Daily before breakfast  piperacillin-tazobactam, 3.375 g, intravenous, q6h  polyethylene glycol, 17 g, oral, Daily  sennosides-docusate sodium, 2 tablet, oral, Nightly      Continuous medications       PRN medications  PRN medications: diphenhydrAMINE, morphine, naloxone, ondansetron, prochlorperazine, pseudoephedrine   This patient has a central line   Reason for the central line remaining today? Hemodynamic monitoring    Assessment/Plan   Assessment & Plan  Sickle cell pain crisis (Multi)    Joellen Narayan is a 23 y.o. 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 CTPE (12/22) neg for PE, showing bilat GGO with new focal nodular opacity in posterior R middle lobe. S/p IV astrid and vanc in ED, start IV zosyn q6h (12/22-current). Pt on new O2 requirements with severe chest pain, c/f ACS. Heme consulted (12/22), s/p RCExchange (12/23). DC pending pain improvement.     12/25 updates:  - increased IV morphine  - s/p 500c IV LR for poor PO intake due to pain  - repeat CXR, troponin and EKG pending for continued CP    #ACS  - pt presented to the ED (12/22) with chest pain   - pt hypoxic on admit and tachycardic, placed on 2L supp O2 -- bumped up to 3L 12/22  - CXR (12/22) neg for acute process  - CTPE (12/22) neg for PE, showing bilat GGO with new focal nodular opacity in posterior R middle lobe  - blood cultures x 2 (12/22) NGTD  - UA +1 leuks, neg nitrites  (12/22)  - urine culture negative (12/22), urine culture neg   - covid/ flu A/B negative (12/22)  - Strep pneumo, legionella neg (12/22)  - MRSA neg (12/22)  - Lipase 37 (12/22)  - troponin 7 (12/22)  - ACS is defined as radiographic evidence of consolidation plus fever > 38.5, New oxygen req, severe chest pain, Respiratory distress or new wheezing.  - Pt meets criteria for acute chest with new O2 requirements (3L), severe chest pain, and radiographic evidence of consolidation  - Heme consulted on admit (12/22) rec RCExchange   - exchange labs ordered 12/22  - transfusion med consulted (12/22) plan for RCExchange, 12/23  - IR order placed for 12/23 line placement, plan to remove today 12/24  - s/p IV meropenum and IV vanc in ED  - (12/22-current) start IV zosyn 3.375 q6h for CAP coverage per attending   - s/p RCExchange (12/23)  - repeat hgb S (12/23) 30.9%  - encourage IS  - 12/24 yesterday pt had syncopal episode- found in the bathroom sitting on the floor, pt states he did not hit his head. Vitals normal, BS normal, brought back to bed and he was in good spirits, s/p 500 ml bolus - will continue to monitor  - Orthos neg (12/24)  - Repeat CXR, EKG and troponin pending (12/25) for continued chest pain     # Hgb SS with severe pain   - OARRS reviewed, no aberrancies  - No current care path  - Hgb baseline ~8.5; currently stable, no indication for simple transfusion (12/22)  - Tbili baseline fluctuates ~5-1; Tbili 9.0 on admission (12/22) --> 12/24 tbili 9.4 --> 12/25 tbili pending  - LDH baseline ~500   on admission (12/22) --> 12/24  --> 12/25 LDH pending  - s/p 1L IV NS in ED (12/22)  - s/p IV dilaudid 3mg q2h PRN for pain (12/22)  - s/p IV dilaudid 4mg q2h PRN for severe pain (12/22-12/24)   - 12/24 overnight pt placed on dilaudid PCA with no relief, states that IV morphine helps with his pain. (12/24) s/p IVP morphine 6mg q2h PRN for severe pain, s/p increased to 8mg IVP morphine q2h PRN  (12/24-12/25)  - 12/25 pt still with uncontrolled pain, increased to 10mg IVP morphine q2h PRN (12/25-current)  - s/p IV toradol 15mg q6h x 3 days with PPI prophy (12/22-12/24)  - Continue folic acid 1mg daily  - Hgb S (12/22) 85.1, post exchange Hgb S 30.9% (12/23)  - utox + opiates and cannabinoid (12/22)  - PO Zofran PRN for opioid-induced nausea, PO Benadryl PRN for opioid-induced pruritus, Bowel regimen for opioid-induced constipation with DocuSenna 2tabs BID and Miralax daily  - 12/25 s/p 500 ml IV LR for decreased PO intake due to pain     # hx of Priapism  - most recent admission c/b worsening priapism needing urgent RCExchange (9/19)  - c/w pseudophed 60mg q8h PRN for priapism      # Recent Choledocholithiasis  - s/p ERCP 7/18/24 with a biliary sphincterotomy where sludge and stones were removed, achieving complete clearance  - Seen by gen surg 8/8/24 Dr. Dove who rec lap cholecystectomy d/t the chance of recurrence of choledocholithiasis  - Surgery has yet to be scheduled, surgery recently cancelled on 10/29  - Tbili 9.0 on admit (12/22)  - Low threshold for CT AP or RUQ US if abdominal pain is present     # Nodular lymphocyte predominant Hodgkins lymphoma (NLPHL)   - Primary Oncologist: Dr. Stoll  - Enlarged lymph nodes noted 4/1/22  - RUQ US (11/14/22) with mildly enlarged LNs in the region of the kavin hepatis  - MRI liver (11/16/22) showed re-demonstration of bulky retroperitoneal lymphadenopathy and kavin hepatic lymphadenopathy    - (11/18/22) lymph node biopsy showed atypical lymphoid infiltrate. Reviewed by Hemepath board, insufficient for lymphoma diagnosis  - PET/CT (12/6/22) showing retroperitoneal lymphadenopathy  - Followed up with Dr. Stoll (12/16/22) with plan for surg/onc consult for large tissue bx but patient missed apt and was lost to follow up  - Requested new apt with Dr. Ronnie Marte 6/19/23, patient was not seen and lost to follow up  - CT a/p (11/28/23) increased size of  "retroperitoneal lymph nodes reflecting extramedullary hematopoiesis I/s/o sickle cell vs lymphoma  - Paraaortic LN bx via IR (11/30/23) consistent with NLPHL. Flow: no clonal B cell or T cell population identified, lymphocyte 95%, CD3+CD4+ 68%, CD3+CD8+ 7%, CD19+ 24%  - Elevated LDH/bili partially from sickle cell disease   - Chemotherapy (R-CHOP) was discussed with primary oncologist Dr. Stoll, and decision was made to simplify his chemotherapy to Rituxan and prednisone q3 weeks mainly due to frequent sickle cell crisis  - Current chemo regimen: Rituxan and prednisone q3 weeks (C1 1/18/24, C2 2/8/24, Missed C3 d/t sickle cell pain crisis, C4 4/4/24, C5 5/16/24, C6 6/7/24)  - Per pt no longer taking Acyclovir 400mg BID prophy   - PET CT (7/25) overall showing great response to treatment with persistent residual viable disease involving a left common iliac node  - PET/CT (11/18) showing Interval increased metabolic activity within upper abdominal and  bilateral inguinal lymph nodes compatible with interval worsening of lymphomatous disease  - Last FUV with Dr. Stoll 11/21, per note no current treatment and will continue with observation at this time, plan for repeat CT/PET in 3 months     # Hx of nocturnal oxygen dependence and hypoxia on room air  - Pt currently has home 2L supp O2   - Wean as able, encourage incentive spirometry  - Pulm FUV 8/8- \"Given his history of sickle cell disease, it is important to ensure he has formal sleep testing to look at desaturations and presence of sleep apnea despite not having a convincing history/body habitus typical for suspecting sleep apnea- patients with sickle cell have a higher prevalence of sleep disorders including nocturnal hypoxemia\"--> rec sleep referral for formal testing if deemed appropriate, ABG and O2 destat testing, and TTE with bubble study  - TTE 8/23 showing EF 60-65%, severely dilated LV and LA, + intrapulmonary shunting    - pt currently on 3L supp O2, " wean as able  - encourage IS     DVT prophy: Lovenox subcutaneous, SCDs, encourage ambulation     DISPO:  - Full code, confirmed on admit  - Discharge pending improvement in pain  - SUSIE Hilla Sylvain (Parent) [phone]    Assessment and plan as above discussed with attending physician Dr. Parnell.    I spent 60 minutes in the professional and overall care of this patient.    CORINA MistryC

## 2024-12-26 ENCOUNTER — OFFICE VISIT (OUTPATIENT)
Dept: HEMATOLOGY/ONCOLOGY | Facility: HOSPITAL | Age: 24
DRG: 811 | End: 2024-12-26
Payer: COMMERCIAL

## 2024-12-26 LAB
ALBUMIN SERPL BCP-MCNC: 4.2 G/DL (ref 3.4–5)
ALP SERPL-CCNC: 110 U/L (ref 33–120)
ALT SERPL W P-5'-P-CCNC: 16 U/L (ref 10–52)
ANION GAP SERPL CALC-SCNC: 13 MMOL/L (ref 10–20)
AST SERPL W P-5'-P-CCNC: 33 U/L (ref 9–39)
BACTERIA BLD CULT: NORMAL
BACTERIA BLD CULT: NORMAL
BASOPHILS # BLD AUTO: 0.03 X10*3/UL (ref 0–0.1)
BASOPHILS # BLD AUTO: 0.04 X10*3/UL (ref 0–0.1)
BASOPHILS NFR BLD AUTO: 0.2 %
BASOPHILS NFR BLD AUTO: 0.3 %
BILIRUB SERPL-MCNC: 7.6 MG/DL (ref 0–1.2)
BUN SERPL-MCNC: 5 MG/DL (ref 6–23)
CALCIUM SERPL-MCNC: 9.5 MG/DL (ref 8.6–10.6)
CHLORIDE SERPL-SCNC: 100 MMOL/L (ref 98–107)
CO2 SERPL-SCNC: 26 MMOL/L (ref 21–32)
CREAT SERPL-MCNC: 0.54 MG/DL (ref 0.5–1.3)
EGFRCR SERPLBLD CKD-EPI 2021: >90 ML/MIN/1.73M*2
EOSINOPHIL # BLD AUTO: 0.14 X10*3/UL (ref 0–0.7)
EOSINOPHIL # BLD AUTO: 0.15 X10*3/UL (ref 0–0.7)
EOSINOPHIL NFR BLD AUTO: 1 %
EOSINOPHIL NFR BLD AUTO: 1.2 %
ERYTHROCYTE [DISTWIDTH] IN BLOOD BY AUTOMATED COUNT: 19.9 % (ref 11.5–14.5)
ERYTHROCYTE [DISTWIDTH] IN BLOOD BY AUTOMATED COUNT: 20 % (ref 11.5–14.5)
GLUCOSE SERPL-MCNC: 93 MG/DL (ref 74–99)
HCT VFR BLD AUTO: 28.4 % (ref 41–52)
HCT VFR BLD AUTO: 30 % (ref 41–52)
HEMOGLOBIN A2: 2.7 % (ref 2–3.5)
HEMOGLOBIN A2: 3.2 % (ref 2–3.5)
HEMOGLOBIN A2: 4.1 % (ref 2–3.5)
HEMOGLOBIN A: 10.3 % (ref 95.8–98)
HEMOGLOBIN A: 65.2 % (ref 95.8–98)
HEMOGLOBIN A: 9.5 % (ref 95.8–98)
HEMOGLOBIN F: 1.2 % (ref 0–2)
HEMOGLOBIN F: 2.2 % (ref 0–2)
HEMOGLOBIN F: 2.3 % (ref 0–2)
HEMOGLOBIN IDENTIFICATION INTERPRETATION: ABNORMAL
HEMOGLOBIN S: 30.9 %
HEMOGLOBIN S: 83.3 %
HEMOGLOBIN S: 85.1 %
HGB BLD-MCNC: 10.3 G/DL (ref 13.5–17.5)
HGB BLD-MCNC: 9.9 G/DL (ref 13.5–17.5)
HGB RETIC QN: 27 PG (ref 28–38)
IMM GRANULOCYTES # BLD AUTO: 0.06 X10*3/UL (ref 0–0.7)
IMM GRANULOCYTES # BLD AUTO: 0.09 X10*3/UL (ref 0–0.7)
IMM GRANULOCYTES NFR BLD AUTO: 0.5 % (ref 0–0.9)
IMM GRANULOCYTES NFR BLD AUTO: 0.6 % (ref 0–0.9)
IMMATURE RETIC FRACTION: 13.1 %
LDH SERPL L TO P-CCNC: 531 U/L (ref 84–246)
LYMPHOCYTES # BLD AUTO: 1.24 X10*3/UL (ref 1.2–4.8)
LYMPHOCYTES # BLD AUTO: 1.3 X10*3/UL (ref 1.2–4.8)
LYMPHOCYTES NFR BLD AUTO: 10.1 %
LYMPHOCYTES NFR BLD AUTO: 9.3 %
MCH RBC QN AUTO: 28.4 PG (ref 26–34)
MCH RBC QN AUTO: 28.6 PG (ref 26–34)
MCHC RBC AUTO-ENTMCNC: 34.3 G/DL (ref 32–36)
MCHC RBC AUTO-ENTMCNC: 34.9 G/DL (ref 32–36)
MCV RBC AUTO: 82 FL (ref 80–100)
MCV RBC AUTO: 83 FL (ref 80–100)
MONOCYTES # BLD AUTO: 1.71 X10*3/UL (ref 0.1–1)
MONOCYTES # BLD AUTO: 1.76 X10*3/UL (ref 0.1–1)
MONOCYTES NFR BLD AUTO: 12.6 %
MONOCYTES NFR BLD AUTO: 13.9 %
NEUTROPHILS # BLD AUTO: 10.63 X10*3/UL (ref 1.2–7.7)
NEUTROPHILS # BLD AUTO: 9.1 X10*3/UL (ref 1.2–7.7)
NEUTROPHILS NFR BLD AUTO: 74.1 %
NEUTROPHILS NFR BLD AUTO: 76.2 %
NRBC BLD-RTO: 0.6 /100 WBCS (ref 0–0)
NRBC BLD-RTO: 0.7 /100 WBCS (ref 0–0)
PATH REVIEW-HGB IDENTIFICATION: ABNORMAL
PLATELET # BLD AUTO: 282 X10*3/UL (ref 150–450)
PLATELET # BLD AUTO: 284 X10*3/UL (ref 150–450)
POTASSIUM SERPL-SCNC: 3.5 MMOL/L (ref 3.5–5.3)
PROT SERPL-MCNC: 7 G/DL (ref 6.4–8.2)
RBC # BLD AUTO: 3.46 X10*6/UL (ref 4.5–5.9)
RBC # BLD AUTO: 3.63 X10*6/UL (ref 4.5–5.9)
RETICS #: 0.52 X10*6/UL (ref 0.02–0.12)
RETICS/RBC NFR AUTO: 14.3 % (ref 0.5–2)
SODIUM SERPL-SCNC: 135 MMOL/L (ref 136–145)
WBC # BLD AUTO: 12.3 X10*3/UL (ref 4.4–11.3)
WBC # BLD AUTO: 14 X10*3/UL (ref 4.4–11.3)

## 2024-12-26 PROCEDURE — 83615 LACTATE (LD) (LDH) ENZYME: CPT

## 2024-12-26 PROCEDURE — 85045 AUTOMATED RETICULOCYTE COUNT: CPT

## 2024-12-26 PROCEDURE — 85025 COMPLETE CBC W/AUTO DIFF WBC: CPT

## 2024-12-26 PROCEDURE — 99233 SBSQ HOSP IP/OBS HIGH 50: CPT | Performed by: NURSE PRACTITIONER

## 2024-12-26 PROCEDURE — 1170000001 HC PRIVATE ONCOLOGY ROOM DAILY

## 2024-12-26 PROCEDURE — 85025 COMPLETE CBC W/AUTO DIFF WBC: CPT | Performed by: NURSE PRACTITIONER

## 2024-12-26 PROCEDURE — 2500000001 HC RX 250 WO HCPCS SELF ADMINISTERED DRUGS (ALT 637 FOR MEDICARE OP)

## 2024-12-26 PROCEDURE — 36415 COLL VENOUS BLD VENIPUNCTURE: CPT

## 2024-12-26 PROCEDURE — 36415 COLL VENOUS BLD VENIPUNCTURE: CPT | Performed by: NURSE PRACTITIONER

## 2024-12-26 PROCEDURE — 2500000004 HC RX 250 GENERAL PHARMACY W/ HCPCS (ALT 636 FOR OP/ED)

## 2024-12-26 PROCEDURE — 2500000004 HC RX 250 GENERAL PHARMACY W/ HCPCS (ALT 636 FOR OP/ED): Performed by: NURSE PRACTITIONER

## 2024-12-26 PROCEDURE — 84075 ASSAY ALKALINE PHOSPHATASE: CPT

## 2024-12-26 RX ORDER — MORPHINE SULFATE 4 MG/ML
12 INJECTION INTRAVENOUS EVERY 2 HOUR PRN
Status: DISCONTINUED | OUTPATIENT
Start: 2024-12-26 | End: 2024-12-27

## 2024-12-26 RX ADMIN — MORPHINE SULFATE 10 MG: 4 INJECTION INTRAVENOUS at 07:56

## 2024-12-26 RX ADMIN — BACLOFEN 5 MG: 10 TABLET ORAL at 14:42

## 2024-12-26 RX ADMIN — MORPHINE SULFATE 10 MG: 4 INJECTION INTRAVENOUS at 10:31

## 2024-12-26 RX ADMIN — MORPHINE SULFATE 10 MG: 4 INJECTION INTRAVENOUS at 03:14

## 2024-12-26 RX ADMIN — MORPHINE SULFATE 10 MG: 4 INJECTION INTRAVENOUS at 01:00

## 2024-12-26 RX ADMIN — BACLOFEN 5 MG: 10 TABLET ORAL at 08:01

## 2024-12-26 RX ADMIN — MORPHINE SULFATE 12 MG: 4 INJECTION INTRAVENOUS at 18:36

## 2024-12-26 RX ADMIN — MORPHINE SULFATE 12 MG: 4 INJECTION INTRAVENOUS at 22:59

## 2024-12-26 RX ADMIN — BACLOFEN 5 MG: 10 TABLET ORAL at 20:41

## 2024-12-26 RX ADMIN — MORPHINE SULFATE 12 MG: 4 INJECTION INTRAVENOUS at 14:38

## 2024-12-26 RX ADMIN — MORPHINE SULFATE 12 MG: 4 INJECTION INTRAVENOUS at 20:41

## 2024-12-26 RX ADMIN — MORPHINE SULFATE 12 MG: 4 INJECTION INTRAVENOUS at 16:35

## 2024-12-26 RX ADMIN — POLYETHYLENE GLYCOL 3350 17 G: 17 POWDER, FOR SOLUTION ORAL at 08:02

## 2024-12-26 RX ADMIN — LEVOFLOXACIN 750 MG: 750 TABLET, FILM COATED ORAL at 10:30

## 2024-12-26 RX ADMIN — MORPHINE SULFATE 10 MG: 4 INJECTION INTRAVENOUS at 05:22

## 2024-12-26 RX ADMIN — PANTOPRAZOLE SODIUM 40 MG: 40 TABLET, DELAYED RELEASE ORAL at 08:01

## 2024-12-26 RX ADMIN — MORPHINE SULFATE 12 MG: 4 INJECTION INTRAVENOUS at 13:15

## 2024-12-26 RX ADMIN — FOLIC ACID 1 MG: 1 TABLET ORAL at 08:02

## 2024-12-26 ASSESSMENT — COGNITIVE AND FUNCTIONAL STATUS - GENERAL
MOVING TO AND FROM BED TO CHAIR: A LITTLE
MOBILITY SCORE: 22
DAILY ACTIVITIY SCORE: 24
WALKING IN HOSPITAL ROOM: A LITTLE
STANDING UP FROM CHAIR USING ARMS: A LITTLE
MOBILITY SCORE: 22
CLIMB 3 TO 5 STEPS WITH RAILING: A LITTLE

## 2024-12-26 ASSESSMENT — PAIN SCALES - GENERAL
PAINLEVEL_OUTOF10: 8
PAINLEVEL_OUTOF10: 9
PAINLEVEL_OUTOF10: 8
PAINLEVEL_OUTOF10: 10 - WORST POSSIBLE PAIN
PAINLEVEL_OUTOF10: 8
PAINLEVEL_OUTOF10: 9
PAINLEVEL_OUTOF10: 10 - WORST POSSIBLE PAIN
PAINLEVEL_OUTOF10: 9
PAINLEVEL_OUTOF10: 8
PAINLEVEL_OUTOF10: 8
PAINLEVEL_OUTOF10: 9

## 2024-12-26 ASSESSMENT — PAIN - FUNCTIONAL ASSESSMENT
PAIN_FUNCTIONAL_ASSESSMENT: 0-10
PAIN_FUNCTIONAL_ASSESSMENT: CPOT (CRITICAL CARE PAIN OBSERVATION TOOL)

## 2024-12-26 NOTE — CARE PLAN
The patient's goals for the shift include pain control   Problem: Pain - Adult  Goal: Verbalizes/displays adequate comfort level or baseline comfort level  Outcome: Progressing       The clinical goals for the shift include pt will remain HDS and VSS throughout shift 12/26/24 by 0700.    Over the shift, the patient did not make progress toward the following goals. Barriers to progression include rating pain 7/10 despite regular pain dosing. Recommendations to address these barriers include regular pain assessments  .

## 2024-12-26 NOTE — PROGRESS NOTES
"Joellen Narayan is a 24 y.o. male on day 4 of admission presenting with Sickle cell pain crisis (Multi).    Subjective   Feeling ok today, in a lot of pain and asked about increasing the frequency of his dilaudid, but we discussed nursing not able to give more frequently than q2h. Instead discussed increasing from 10mg --> 12mg. Pt sitting up in chair. States he wants to go home but is in too much pain.     Denies any HA, dizziness/lightheadedness, fevers/chills, cough, congestion, rhinorrhea, sore throat, SOB, CP, palpitations, abdominal pain, n/v/d/c, melena, dysuria, urgency/frequency, hematuria, numbness/tingling, bruising/bleeding or rashes. Denies any sick contacts. ROS otherwise negative.       Objective     Physical Exam  Vitals reviewed.   Constitutional:       Appearance: Normal appearance.   HENT:      Head: Normocephalic and atraumatic.      Nose: Nose normal.      Mouth/Throat:      Mouth: Mucous membranes are moist.      Pharynx: Oropharynx is clear.   Eyes:      Extraocular Movements: Extraocular movements intact.      Pupils: Pupils are equal, round, and reactive to light.   Cardiovascular:      Rate and Rhythm: Normal rate and regular rhythm.      Pulses: Normal pulses.      Heart sounds: Normal heart sounds.   Pulmonary:      Effort: Pulmonary effort is normal.      Breath sounds: Normal breath sounds.   Abdominal:      General: Bowel sounds are normal.      Palpations: Abdomen is soft.   Musculoskeletal:         General: Normal range of motion.   Skin:     General: Skin is warm.   Neurological:      General: No focal deficit present.      Mental Status: He is alert and oriented to person, place, and time. Mental status is at baseline.   Psychiatric:         Mood and Affect: Mood normal.         Behavior: Behavior normal.       Last Recorded Vitals  Blood pressure 132/78, pulse 80, temperature 36.5 °C (97.7 °F), temperature source Temporal, resp. rate 16, height 1.88 m (6' 2.02\"), weight 77.1 kg " (170 lb), SpO2 99%.  Intake/Output last 3 Shifts:  I/O last 3 completed shifts:  In: 550 (7.1 mL/kg) [IV Piggyback:550]  Out: 1900 (24.6 mL/kg) [Urine:1900 (0.7 mL/kg/hr)]  Weight: 77.1 kg             Assessment/Plan   Assessment & Plan  Sickle cell pain crisis (Multi)    Joellen Narayan is a 23 y.o. 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 CTPE (12/22) without evidence of PE, but showing bilat GGO with new focal nodular opacity in posterior R middle lobe. S/p IV astrid and vanc in ED, s/p IV zosyn q6h (12/22- 12/25). Pt on new O2 requirements with severe chest pain, c/f ACS. Heme consulted (12/22), s/p RBC exchange (12/23). Transitioned to levofloxacin (12/25- current). DC pending pain improvement.     12/26 updates:  - increased IV morphine to 12mg q2h PRN  - on RA during daytime as of 12/26     #ACS s/p exchange   - pt presented to the ED (12/22) with chest pain   - pt hypoxic on admit and tachycardic, placed on 2L supp O2 -- bumped up to 3L 12/22  - CXR (12/22) neg for acute process  - CTPE (12/22) neg for PE, showing bilat GGO with new focal nodular opacity in posterior R middle lobe  - blood cultures x 2 (12/22) NGTD  - UA +1 leuks, neg nitrites (12/22)  - urine culture negative (12/22), urine culture neg   - covid/ flu A/B negative (12/22)  - Strep pneumo, legionella neg (12/22)  - MRSA neg (12/22), Lipase 37 (12/22), troponin 7 (12/22)  - ACS is defined as radiographic evidence of consolidation plus fever > 38.5, New oxygen req, severe chest pain, Respiratory distress or  new wheezing.  - Pt meets criteria for acute chest with new O2 requirements (3L), severe chest pain, and radiographic evidence of consolidation  - Heme consulted on admit (12/22) rec RBC exchange   - transfusion med consulted (12/22), s/p RBC exchange 12/23  - IR order placed for 12/23 line placement, removed today 12/24  - s/p IV astrid and IV vanc in ED  - (12/22- 12/25) s/p IV zosyn 3.375 q6h for CAP coverage per attending   - 12/25- current (plan stop 12/29) continue levofloxacin 750mg q24h   - repeat hgb S (12/23) 30.9%  - encourage IS  - 12/24 pt had syncopal episode- found in the bathroom sitting on the floor, pt states he did not hit his head. Vitals normal, BS normal, brought back to bed and he was in good spirits, s/p 500 ml bolus - will continue to monitor  - Orthos neg (12/24)  - Repeat EKG and troponin neg (12/25) for continued chest pain  - CXR 12/25 shows new bibasilar opacity with blunting of bilateral costophrenic angle      # Hgb SS with severe pain   - OARRS reviewed, no aberrancies  - No current care path  - Hgb baseline ~8.5; currently stable, no indication for simple transfusion (12/22)  - Tbili baseline fluctuates ~5-1; Tbili 9.0 on admission (12/22) --> 12/24 tbili 9.4 --> 12/25 tbili pending  - LDH baseline ~500   on admission (12/22) --> 12/24  --> 12/25 LDH pending  - s/p 1L IV NS in ED (12/22)  - s/p IV dilaudid 3mg q2h PRN for pain (12/22)  - s/p IV dilaudid 4mg q2h PRN for severe pain (12/22-12/24)   - 12/24 overnight pt placed on dilaudid PCA with no relief, states that IV morphine helps with his pain. (12/24) s/p IVP morphine 6mg q2h PRN for severe pain, s/p increased to 8mg IVP morphine q2h PRN (12/24-12/25)  - 12/25 pt still with uncontrolled pain, increased to 10mg IVP morphine q2h PRN (12/25-12/26)  - 12/26 increased morphine to 12mg IVP q2h PRN  - s/p IV toradol 15mg q6h x 3 days with PPI prophy (12/22-12/24)  - Continue folic acid 1mg daily  - Hgb S (12/22) 85.1,  post exchange Hgb S 30.9% (12/23)  - utox + opiates and cannabinoid (12/22)  - PO Zofran PRN for opioid-induced nausea, PO Benadryl PRN for opioid-induced pruritus, Bowel regimen for opioid-induced constipation with DocuSenna 2tabs BID and Miralax daily  - 12/25 s/p 500 ml IV LR for decreased PO intake due to pain     # hx of Priapism  - most recent admission c/b worsening priapism needing urgent [facility] (9/19)  - c/w pseudophed 60mg q8h PRN for priapism      # Recent Choledocholithiasis  - s/p ERCP 7/18/24 with a biliary sphincterotomy where sludge and stones were removed, achieving complete clearance  - Seen by gen surg 8/8/24 Dr. Dove who rec lap cholecystectomy d/t the chance of recurrence of choledocholithiasis  - Surgery has yet to be scheduled, surgery recently cancelled on 10/29  - Tbili 9.0 on admit (12/22)  - Low threshold for CT AP or RUQ US if abdominal pain is present     # Nodular lymphocyte predominant Hodgkins lymphoma (NLPHL)   - Primary Oncologist: Dr. Stoll  - Enlarged lymph nodes noted 4/1/22  - RUQ US (11/14/22) with mildly enlarged LNs in the region of the kavin hepatis  - MRI liver (11/16/22) showed re-demonstration of bulky retroperitoneal lymphadenopathy and kavin hepatic lymphadenopathy    - (11/18/22) lymph node biopsy showed atypical lymphoid infiltrate. Reviewed by Hemepath board, insufficient for lymphoma diagnosis  - PET/CT (12/6/22) showing retroperitoneal lymphadenopathy  - Followed up with Dr. Stoll (12/16/22) with plan for surg/onc consult for large tissue bx but patient missed apt and was lost to follow up  - Requested new apt with Dr. Ronnie Marte 6/19/23, patient was not seen and lost to follow up  - CT a/p (11/28/23) increased size of retroperitoneal lymph nodes reflecting extramedullary hematopoiesis I/s/o sickle cell vs lymphoma  - Paraaortic LN bx via IR (11/30/23) consistent with NLPHL. Flow: no clonal B cell or T cell population identified, lymphocyte 95%, CD3+CD4+  "68%, CD3+CD8+ 7%, CD19+ 24%  - Elevated LDH/bili partially from sickle cell disease   - Chemotherapy (R-CHOP) was discussed with primary oncologist Dr. Stoll, and decision was made to simplify his chemotherapy to Rituxan and prednisone q3 weeks mainly due to frequent sickle cell crisis  - Current chemo regimen: Rituxan and prednisone q3 weeks (C1 1/18/24, C2 2/8/24, Missed C3 d/t sickle cell pain crisis, C4 4/4/24, C5 5/16/24, C6 6/7/24)  - Per pt no longer taking Acyclovir 400mg BID prophy   - PET CT (7/25) overall showing great response to treatment with persistent residual viable disease involving a left common iliac node  - PET/CT (11/18) showing Interval increased metabolic activity within upper abdominal and  bilateral inguinal lymph nodes compatible with interval worsening of lymphomatous disease  - Last FUV with Dr. Stoll 11/21, per note no current treatment and will continue with observation at this time, plan for repeat CT/PET in 3 months     # Hx of nocturnal oxygen dependence and hypoxia on room air  - Pt currently has home 2L supp O2   - Wean as able, encourage incentive spirometry  - Pulm FUV 8/8- \"Given his history of sickle cell disease, it is important to ensure he has formal sleep testing to look at desaturations and presence of sleep apnea despite not having a convincing history/body habitus typical for suspecting sleep apnea- patients with sickle cell have a higher prevalence of sleep disorders including nocturnal hypoxemia\"--> rec sleep referral for formal testing if deemed appropriate, ABG and O2 destat testing, and TTE with bubble study  - TTE 8/23 showing EF 60-65%, severely dilated LV and LA, + intrapulmonary shunting    - pt currently on 3L supp O2, wean as able  - encourage IS     DVT prophy: Lovenox subcutaneous, SCDs, encourage ambulation     DISPO:  - Full code, confirmed on admit  - Discharge home resumed home O2 pending improvement in pain, poss 12/27   - SUSIE Narayan " (Parent) [phone]  - sickle cell FUV 1/8     Assessment and plan as above discussed with attending physician Dr. Parnell.     I spent 60 minutes in the professional and overall care of this patient.    RAAD Parsons-CNP

## 2024-12-26 NOTE — CARE PLAN
The clinical goals for the shift include pt will remain HDS and VSS throughout shift 12/26/24 by 0700.      Problem: Pain - Adult  Goal: Verbalizes/displays adequate comfort level or baseline comfort level  Outcome: Progressing     Problem: Safety - Adult  Goal: Free from fall injury  Outcome: Progressing     Problem: Discharge Planning  Goal: Discharge to home or other facility with appropriate resources  Outcome: Progressing     Problem: Pain  Goal: Takes deep breaths with improved pain control throughout the shift  Outcome: Progressing  Goal: Turns in bed with improved pain control throughout the shift  Outcome: Progressing  Goal: Walks with improved pain control throughout the shift  Outcome: Progressing  Goal: Performs ADL's with improved pain control throughout shift  Outcome: Progressing     Problem: Fall/Injury  Goal: Not fall by end of shift  Outcome: Progressing  Goal: Be free from injury by end of the shift  Outcome: Progressing  Goal: Verbalize understanding of personal risk factors for fall in the hospital  Outcome: Progressing  Goal: Verbalize understanding of risk factor reduction measures to prevent injury from fall in the home  Outcome: Progressing  Goal: Use assistive devices by end of the shift  Outcome: Progressing  Goal: Pace activities to prevent fatigue by end of the shift  Outcome: Progressing

## 2024-12-27 ENCOUNTER — APPOINTMENT (OUTPATIENT)
Dept: RADIOLOGY | Facility: HOSPITAL | Age: 24
DRG: 811 | End: 2024-12-27
Payer: COMMERCIAL

## 2024-12-27 LAB
ALBUMIN SERPL BCP-MCNC: 4.1 G/DL (ref 3.4–5)
ALP SERPL-CCNC: 142 U/L (ref 33–120)
ALT SERPL W P-5'-P-CCNC: 22 U/L (ref 10–52)
ANION GAP SERPL CALC-SCNC: 16 MMOL/L (ref 10–20)
AST SERPL W P-5'-P-CCNC: 35 U/L (ref 9–39)
BILIRUB SERPL-MCNC: 9.2 MG/DL (ref 0–1.2)
BUN SERPL-MCNC: 7 MG/DL (ref 6–23)
CALCIUM SERPL-MCNC: 9.6 MG/DL (ref 8.6–10.6)
CHLORIDE SERPL-SCNC: 99 MMOL/L (ref 98–107)
CO2 SERPL-SCNC: 27 MMOL/L (ref 21–32)
CREAT SERPL-MCNC: 0.54 MG/DL (ref 0.5–1.3)
EGFRCR SERPLBLD CKD-EPI 2021: >90 ML/MIN/1.73M*2
GGT SERPL-CCNC: 74 U/L (ref 5–64)
GLUCOSE SERPL-MCNC: 84 MG/DL (ref 74–99)
HGB RETIC QN: 27 PG (ref 28–38)
IMMATURE RETIC FRACTION: 17.7 %
LDH SERPL L TO P-CCNC: 477 U/L (ref 84–246)
POTASSIUM SERPL-SCNC: 3.8 MMOL/L (ref 3.5–5.3)
PROT SERPL-MCNC: 7 G/DL (ref 6.4–8.2)
RETICS #: 0.37 X10*6/UL (ref 0.02–0.12)
RETICS/RBC NFR AUTO: 10.7 % (ref 0.5–2)
SODIUM SERPL-SCNC: 138 MMOL/L (ref 136–145)

## 2024-12-27 PROCEDURE — 2500000004 HC RX 250 GENERAL PHARMACY W/ HCPCS (ALT 636 FOR OP/ED)

## 2024-12-27 PROCEDURE — 83615 LACTATE (LD) (LDH) ENZYME: CPT | Performed by: NURSE PRACTITIONER

## 2024-12-27 PROCEDURE — 99233 SBSQ HOSP IP/OBS HIGH 50: CPT

## 2024-12-27 PROCEDURE — 36415 COLL VENOUS BLD VENIPUNCTURE: CPT | Performed by: NURSE PRACTITIONER

## 2024-12-27 PROCEDURE — 84075 ASSAY ALKALINE PHOSPHATASE: CPT | Performed by: NURSE PRACTITIONER

## 2024-12-27 PROCEDURE — 2500000005 HC RX 250 GENERAL PHARMACY W/O HCPCS

## 2024-12-27 PROCEDURE — 2500000004 HC RX 250 GENERAL PHARMACY W/ HCPCS (ALT 636 FOR OP/ED): Performed by: NURSE PRACTITIONER

## 2024-12-27 PROCEDURE — 76705 ECHO EXAM OF ABDOMEN: CPT | Performed by: RADIOLOGY

## 2024-12-27 PROCEDURE — 2500000001 HC RX 250 WO HCPCS SELF ADMINISTERED DRUGS (ALT 637 FOR MEDICARE OP)

## 2024-12-27 PROCEDURE — 1170000001 HC PRIVATE ONCOLOGY ROOM DAILY

## 2024-12-27 PROCEDURE — 76705 ECHO EXAM OF ABDOMEN: CPT

## 2024-12-27 PROCEDURE — 85045 AUTOMATED RETICULOCYTE COUNT: CPT | Performed by: NURSE PRACTITIONER

## 2024-12-27 RX ORDER — MORPHINE SULFATE 4 MG/ML
10 INJECTION INTRAVENOUS EVERY 2 HOUR PRN
Status: DISCONTINUED | OUTPATIENT
Start: 2024-12-27 | End: 2024-12-28

## 2024-12-27 RX ADMIN — MORPHINE SULFATE 10 MG: 4 INJECTION INTRAVENOUS at 15:18

## 2024-12-27 RX ADMIN — BACLOFEN 5 MG: 10 TABLET ORAL at 16:18

## 2024-12-27 RX ADMIN — BACITRACIN ZINC AND POLYMYXIN B SULFATE: 500; 10000 OINTMENT TOPICAL at 11:18

## 2024-12-27 RX ADMIN — MORPHINE SULFATE 10 MG: 4 INJECTION INTRAVENOUS at 21:14

## 2024-12-27 RX ADMIN — MORPHINE SULFATE 12 MG: 4 INJECTION INTRAVENOUS at 03:16

## 2024-12-27 RX ADMIN — BACLOFEN 5 MG: 10 TABLET ORAL at 21:15

## 2024-12-27 RX ADMIN — SENNOSIDES AND DOCUSATE SODIUM 2 TABLET: 50; 8.6 TABLET ORAL at 21:15

## 2024-12-27 RX ADMIN — FOLIC ACID 1 MG: 1 TABLET ORAL at 08:28

## 2024-12-27 RX ADMIN — BACLOFEN 5 MG: 10 TABLET ORAL at 08:28

## 2024-12-27 RX ADMIN — POLYETHYLENE GLYCOL 3350 17 G: 17 POWDER, FOR SOLUTION ORAL at 08:29

## 2024-12-27 RX ADMIN — MORPHINE SULFATE 10 MG: 4 INJECTION INTRAVENOUS at 10:35

## 2024-12-27 RX ADMIN — PANTOPRAZOLE SODIUM 40 MG: 40 TABLET, DELAYED RELEASE ORAL at 08:28

## 2024-12-27 RX ADMIN — MORPHINE SULFATE 12 MG: 4 INJECTION INTRAVENOUS at 08:27

## 2024-12-27 RX ADMIN — BACITRACIN ZINC AND POLYMYXIN B SULFATE: 500; 10000 OINTMENT TOPICAL at 21:16

## 2024-12-27 RX ADMIN — MORPHINE SULFATE 12 MG: 4 INJECTION INTRAVENOUS at 06:19

## 2024-12-27 RX ADMIN — MORPHINE SULFATE 12 MG: 4 INJECTION INTRAVENOUS at 01:13

## 2024-12-27 RX ADMIN — MORPHINE SULFATE 10 MG: 4 INJECTION INTRAVENOUS at 12:54

## 2024-12-27 RX ADMIN — MORPHINE SULFATE 10 MG: 4 INJECTION INTRAVENOUS at 23:47

## 2024-12-27 RX ADMIN — MORPHINE SULFATE 10 MG: 4 INJECTION INTRAVENOUS at 18:52

## 2024-12-27 RX ADMIN — LEVOFLOXACIN 750 MG: 750 TABLET, FILM COATED ORAL at 10:36

## 2024-12-27 ASSESSMENT — PAIN - FUNCTIONAL ASSESSMENT
PAIN_FUNCTIONAL_ASSESSMENT: 0-10

## 2024-12-27 ASSESSMENT — PAIN SCALES - GENERAL
PAINLEVEL_OUTOF10: 8
PAINLEVEL_OUTOF10: 8
PAINLEVEL_OUTOF10: 9
PAINLEVEL_OUTOF10: 9
PAINLEVEL_OUTOF10: 10 - WORST POSSIBLE PAIN
PAINLEVEL_OUTOF10: 7
PAINLEVEL_OUTOF10: 9
PAINLEVEL_OUTOF10: 9
PAINLEVEL_OUTOF10: 8
PAINLEVEL_OUTOF10: 7
PAINLEVEL_OUTOF10: 9
PAINLEVEL_OUTOF10: 9
PAINLEVEL_OUTOF10: 8
PAINLEVEL_OUTOF10: 7
PAINLEVEL_OUTOF10: 10 - WORST POSSIBLE PAIN
PAINLEVEL_OUTOF10: 9
PAINLEVEL_OUTOF10: 7

## 2024-12-27 ASSESSMENT — COGNITIVE AND FUNCTIONAL STATUS - GENERAL
WALKING IN HOSPITAL ROOM: A LITTLE
MOBILITY SCORE: 22
DAILY ACTIVITIY SCORE: 24
MOBILITY SCORE: 24
CLIMB 3 TO 5 STEPS WITH RAILING: A LITTLE
DAILY ACTIVITIY SCORE: 24

## 2024-12-27 NOTE — PROGRESS NOTES
"Joellen Narayan is a 24 y.o. male on day 5 of admission presenting with Sickle cell pain crisis (Multi).    Subjective   Patient seen resting in bed. Reports feeling okay this morning. Reports that chest pain is improved today but still endorses abdominal and rib pain that he states has been present since admission. His appetite is slowly starting to improve, having bowel movements, last this morning. Overall feels that increase in dose of morphine has helped to better control his pain. We discussed decreasing dose of IVP morphine to 10mg q2 today, patient agreeable. Wearing 3L O2 (baseline 2L at night and room air during the day). He otherwise denies N/V, F/C, H/A, cough, dizziness or congestion.        Objective     Physical Exam  Constitutional:       Appearance: Normal appearance.   HENT:      Mouth/Throat:      Mouth: Mucous membranes are moist.   Eyes:      Pupils: Pupils are equal, round, and reactive to light.   Cardiovascular:      Rate and Rhythm: Normal rate and regular rhythm.   Pulmonary:      Effort: Pulmonary effort is normal.      Comments: 3L O2 in place   Abdominal:      General: Abdomen is flat. Bowel sounds are normal. There is no distension.      Palpations: Abdomen is soft.      Tenderness: There is no abdominal tenderness. There is no guarding.   Musculoskeletal:         General: Normal range of motion.   Skin:     General: Skin is warm.      Capillary Refill: Capillary refill takes less than 2 seconds.   Neurological:      General: No focal deficit present.      Mental Status: He is alert.   Psychiatric:         Mood and Affect: Mood normal.         Last Recorded Vitals  Blood pressure 121/76, pulse 79, temperature 36.6 °C (97.9 °F), temperature source Temporal, resp. rate 16, height 1.88 m (6' 2.02\"), weight 77.1 kg (170 lb), SpO2 94%.  Intake/Output last 3 Shifts:  I/O last 3 completed shifts:  In: - (0 mL/kg)   Out: 1300 (16.9 mL/kg) [Urine:1300 (0.5 mL/kg/hr)]  Weight: 77.1 kg "     Relevant Results                 Scheduled medications  baclofen, 5 mg, oral, TID  enoxaparin, 40 mg, subcutaneous, q24h  folic acid, 1 mg, oral, Daily  levoFLOXacin, 750 mg, oral, q24h  pantoprazole, 40 mg, oral, Daily before breakfast  polyethylene glycol, 17 g, oral, Daily  sennosides-docusate sodium, 2 tablet, oral, Nightly      Continuous medications     PRN medications  PRN medications: diphenhydrAMINE, morphine, naloxone, ondansetron, prochlorperazine, pseudoephedrine               Assessment/Plan   Assessment & Plan  Sickle cell pain crisis (Multi)    Joellen Narayan is a 23 y.o. 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 CTPE (12/22) without evidence of PE, but showing bilat GGO with new focal nodular opacity in posterior R middle lobe. S/p IV astrid and vanc in ED, s/p IV zosyn q6h (12/22- 12/25). Pt on new O2 requirements with severe chest pain, c/f ACS. Heme consulted (12/22), s/p RBC exchange (12/23). Transitioned to levofloxacin (12/25- planned stop 12/29). Abdominal pain and increased Tbili, given history of choledocholithiasis, RUQ US and GGT ordered and pending. DC pending pain improvement.     12/27 updates:  - decreased dose of IVP morphine to 10mg q2 hours   - RUQ US ordered d/t increased abdominal pain and increased Tbili today   - GGT ordered and pending   - 3L O2      #ACS s/p exchange   - pt presented to the ED (12/22) with chest pain   - pt hypoxic on admit and tachycardic, placed on 2L supp O2 -- bumped up to 3L 12/22  - CXR (12/22) neg for acute  process  - CTPE (12/22) neg for PE, showing bilat GGO with new focal nodular opacity in posterior R middle lobe  - blood cultures x 2 (12/22) NGTD  - UA +1 leuks, neg nitrites (12/22)  - urine culture negative (12/22), urine culture neg   - covid/ flu A/B negative (12/22)  - Strep pneumo, legionella neg (12/22)  - MRSA neg (12/22), Lipase 37 (12/22), troponin 7 (12/22)  - ACS is defined as radiographic evidence of consolidation plus fever > 38.5, New oxygen req, severe chest pain, Respiratory distress or new wheezing.  - Pt meets criteria for acute chest with new O2 requirements (3L), severe chest pain, and radiographic evidence of consolidation  - Heme consulted on admit (12/22) rec RBC exchange   - transfusion med consulted (12/22), s/p RBC exchange 12/23  - IR order placed for 12/23 line placement, removed today 12/24  - s/p IV astrid and IV vanc in ED  - (12/22- 12/25) s/p IV zosyn 3.375 q6h for CAP coverage per attending   - 12/25- current (plan stop 12/29) continue levofloxacin 750mg q24h   - repeat hgb S (12/23) 30.9%  - encourage IS  - 12/24 pt had syncopal episode- found in the bathroom sitting on the floor, pt states he did not hit his head. Vitals normal, BS normal, brought back to bed and he was in good spirits, s/p 500 ml bolus - will continue to monitor  - Orthos neg (12/24)  - Repeat EKG and troponin neg (12/25) for continued chest pain  - CXR 12/25 shows new bibasilar opacity with blunting of bilateral costophrenic angle      # Hgb SS with severe pain   - OARRS reviewed, no aberrancies  - No current care path  - Hgb baseline ~8.5; currently stable, no indication for simple transfusion (12/22)  - Tbili baseline fluctuates ~5-1; Tbili 9.0 on admission (12/22) --> 12/24 tbili 9.4 --> 12/27 tbili pending  - LDH baseline ~500   on admission (12/22) --> 12/24  --> 12/27 LDH pending  - s/p 1L IV NS in ED (12/22)  - s/p IV dilaudid 3mg q2h PRN for pain (12/22)  - s/p IV dilaudid 4mg q2h PRN for  severe pain (12/22-12/24)   - 12/24 overnight pt placed on dilaudid PCA with no relief, states that IV morphine helps with his pain. (12/24) s/p IVP morphine 6mg q2h PRN for severe pain, s/p increased to 8mg IVP morphine q2h PRN (12/24-12/25)  - 12/25 pt still with uncontrolled pain, increased to 10mg IVP morphine q2h PRN (12/25-12/26)  - s/p IVP morphine to 12mg IVP q2h PRN (12/26-12/27)   - 12/27 start IVP morphine 10mg q2 hours   - s/p IV toradol 15mg q6h x 3 days with PPI prophy (12/22-12/24)  - Continue folic acid 1mg daily  - Hgb S (12/22) 85.1, post exchange Hgb S 30.9% (12/23)  - utox + opiates and cannabinoid (12/22)  - PO Zofran PRN for opioid-induced nausea, PO Benadryl PRN for opioid-induced pruritus, Bowel regimen for opioid-induced constipation with DocuSenna 2tabs BID and Miralax daily  - 12/25 s/p 500 ml IV LR for decreased PO intake due to pain     # hx of Priapism  - most recent admission c/b worsening priapism needing urgent [facility] (9/19)  - c/w pseudophed 60mg q8h PRN for priapism      # Recent Choledocholithiasis  - s/p ERCP 7/18/24 with a biliary sphincterotomy where sludge and stones were removed, achieving complete clearance  - Seen by gen surg 8/8/24 Dr. Dove who rec lap cholecystectomy d/t the chance of recurrence of choledocholithiasis  - Surgery has yet to be scheduled, surgery recently cancelled on 10/29  - Tbili 9.0 on admit (12/22), started to downtrend, now uptrending as of 12/27, Tbili 9.3 (12/27)   - RUQ US ordered and pending (12/27)  - GGT ordered and pending (12/27)      # Nodular lymphocyte predominant Hodgkins lymphoma (NLPHL)   - Primary Oncologist: Dr. Stoll  - Enlarged lymph nodes noted 4/1/22  - RUQ US (11/14/22) with mildly enlarged LNs in the region of the kavin hepatis  - MRI liver (11/16/22) showed re-demonstration of bulky retroperitoneal lymphadenopathy and kavin hepatic lymphadenopathy    - (11/18/22) lymph node biopsy showed atypical lymphoid infiltrate.  "Reviewed by Hemepath board, insufficient for lymphoma diagnosis  - PET/CT (12/6/22) showing retroperitoneal lymphadenopathy  - Followed up with Dr. Stoll (12/16/22) with plan for surg/onc consult for large tissue bx but patient missed apt and was lost to follow up  - Requested new apt with Dr. Ronnie Marte 6/19/23, patient was not seen and lost to follow up  - CT a/p (11/28/23) increased size of retroperitoneal lymph nodes reflecting extramedullary hematopoiesis I/s/o sickle cell vs lymphoma  - Paraaortic LN bx via IR (11/30/23) consistent with NLPHL. Flow: no clonal B cell or T cell population identified, lymphocyte 95%, CD3+CD4+ 68%, CD3+CD8+ 7%, CD19+ 24%  - Elevated LDH/bili partially from sickle cell disease   - Chemotherapy (R-CHOP) was discussed with primary oncologist Dr. Stoll, and decision was made to simplify his chemotherapy to Rituxan and prednisone q3 weeks mainly due to frequent sickle cell crisis  - Current chemo regimen: Rituxan and prednisone q3 weeks (C1 1/18/24, C2 2/8/24, Missed C3 d/t sickle cell pain crisis, C4 4/4/24, C5 5/16/24, C6 6/7/24)  - Per pt no longer taking Acyclovir 400mg BID prophy   - PET CT (7/25) overall showing great response to treatment with persistent residual viable disease involving a left common iliac node  - PET/CT (11/18) showing Interval increased metabolic activity within upper abdominal and  bilateral inguinal lymph nodes compatible with interval worsening of lymphomatous disease  - Last FUV with Dr. Stoll 11/21, per note no current treatment and will continue with observation at this time, plan for repeat CT/PET in 3 months     # Hx of nocturnal oxygen dependence and hypoxia on room air  - Pt currently has home 2L supp O2   - Wean as able, encourage incentive spirometry  - Pulm FUV 8/8- \"Given his history of sickle cell disease, it is important to ensure he has formal sleep testing to look at desaturations and presence of sleep apnea despite not having a " "convincing history/body habitus typical for suspecting sleep apnea- patients with sickle cell have a higher prevalence of sleep disorders including nocturnal hypoxemia\"--> rec sleep referral for formal testing if deemed appropriate, ABG and O2 destat testing, and TTE with bubble study  - TTE 8/23 showing EF 60-65%, severely dilated LV and LA, + intrapulmonary shunting    - pt currently on 3L supp O2, wean as able  - encourage IS     DVT prophy: Lovenox subcutaneous, SCDs, encourage ambulation     DISPO:  - Full code, confirmed on admit  - Discharge home resumed home O2 pending improvement in pain  - SUSIE Narayan (Parent) [phone]  - sickle cell FUV 1/8       I spent 60 minutes in the professional and overall care of this patient.    Assessment and plan as above discussed with attending physician, Dr. Soheila Stoll, PAKenzieC      "

## 2024-12-27 NOTE — CARE PLAN
The clinical goals for the shift include pt will remain HDS and VSS throughout shift 12/27/24 by 0700.      Problem: Pain - Adult  Goal: Verbalizes/displays adequate comfort level or baseline comfort level  Outcome: Progressing     Problem: Safety - Adult  Goal: Free from fall injury  Outcome: Progressing     Problem: Discharge Planning  Goal: Discharge to home or other facility with appropriate resources  Outcome: Progressing     Problem: Pain  Goal: Takes deep breaths with improved pain control throughout the shift  Outcome: Progressing  Goal: Turns in bed with improved pain control throughout the shift  Outcome: Progressing  Goal: Walks with improved pain control throughout the shift  Outcome: Progressing  Goal: Performs ADL's with improved pain control throughout shift  Outcome: Progressing     Problem: Fall/Injury  Goal: Not fall by end of shift  Outcome: Progressing  Goal: Be free from injury by end of the shift  Outcome: Progressing  Goal: Verbalize understanding of personal risk factors for fall in the hospital  Outcome: Progressing  Goal: Verbalize understanding of risk factor reduction measures to prevent injury from fall in the home  Outcome: Progressing  Goal: Use assistive devices by end of the shift  Outcome: Progressing  Goal: Pace activities to prevent fatigue by end of the shift  Outcome: Progressing

## 2024-12-27 NOTE — CARE PLAN
Problem: Pain - Adult  Goal: Verbalizes/displays adequate comfort level or baseline comfort level  Outcome: Progressing     Problem: Safety - Adult  Goal: Free from fall injury  Outcome: Progressing     Problem: Discharge Planning  Goal: Discharge to home or other facility with appropriate resources  Outcome: Progressing     Problem: Pain  Goal: Takes deep breaths with improved pain control throughout the shift  Outcome: Progressing  Goal: Turns in bed with improved pain control throughout the shift  Outcome: Progressing  Goal: Walks with improved pain control throughout the shift  Outcome: Progressing  Goal: Performs ADL's with improved pain control throughout shift  Outcome: Progressing     Problem: Fall/Injury  Goal: Not fall by end of shift  Outcome: Progressing  Goal: Be free from injury by end of the shift  Outcome: Progressing  Goal: Verbalize understanding of personal risk factors for fall in the hospital  Outcome: Progressing  Goal: Verbalize understanding of risk factor reduction measures to prevent injury from fall in the home  Outcome: Progressing  Goal: Use assistive devices by end of the shift  Outcome: Progressing  Goal: Pace activities to prevent fatigue by end of the shift  Outcome: Progressing   The patient's goals for the shift include      The clinical goals for the shift include Pt will remain HDS and VSS throughout shift on 12/27/24 at 1900    Over the shift, the patient remained safe and free from injury. Pt complained of pain throughout the shift and was given pain medication to keep comfortable.

## 2024-12-28 LAB
ALBUMIN SERPL BCP-MCNC: 4 G/DL (ref 3.4–5)
ALP SERPL-CCNC: 191 U/L (ref 33–120)
ALT SERPL W P-5'-P-CCNC: 32 U/L (ref 10–52)
ANION GAP SERPL CALC-SCNC: 12 MMOL/L (ref 10–20)
AST SERPL W P-5'-P-CCNC: 54 U/L (ref 9–39)
BASOPHILS # BLD AUTO: 0.05 X10*3/UL (ref 0–0.1)
BASOPHILS NFR BLD AUTO: 0.5 %
BILIRUB SERPL-MCNC: 8.3 MG/DL (ref 0–1.2)
BUN SERPL-MCNC: 7 MG/DL (ref 6–23)
CALCIUM SERPL-MCNC: 9.3 MG/DL (ref 8.6–10.6)
CHLORIDE SERPL-SCNC: 100 MMOL/L (ref 98–107)
CO2 SERPL-SCNC: 28 MMOL/L (ref 21–32)
CREAT SERPL-MCNC: 0.59 MG/DL (ref 0.5–1.3)
EGFRCR SERPLBLD CKD-EPI 2021: >90 ML/MIN/1.73M*2
EOSINOPHIL # BLD AUTO: 0.44 X10*3/UL (ref 0–0.7)
EOSINOPHIL NFR BLD AUTO: 4 %
ERYTHROCYTE [DISTWIDTH] IN BLOOD BY AUTOMATED COUNT: 21.3 % (ref 11.5–14.5)
GLUCOSE SERPL-MCNC: 80 MG/DL (ref 74–99)
HCT VFR BLD AUTO: 29.1 % (ref 41–52)
HGB BLD-MCNC: 9.8 G/DL (ref 13.5–17.5)
HGB RETIC QN: 26 PG (ref 28–38)
IMM GRANULOCYTES # BLD AUTO: 0.07 X10*3/UL (ref 0–0.7)
IMM GRANULOCYTES NFR BLD AUTO: 0.6 % (ref 0–0.9)
IMMATURE RETIC FRACTION: 20.6 %
LDH SERPL L TO P-CCNC: 470 U/L (ref 84–246)
LYMPHOCYTES # BLD AUTO: 2.19 X10*3/UL (ref 1.2–4.8)
LYMPHOCYTES NFR BLD AUTO: 20.1 %
MCH RBC QN AUTO: 27.8 PG (ref 26–34)
MCHC RBC AUTO-ENTMCNC: 33.7 G/DL (ref 32–36)
MCV RBC AUTO: 83 FL (ref 80–100)
MONOCYTES # BLD AUTO: 1.32 X10*3/UL (ref 0.1–1)
MONOCYTES NFR BLD AUTO: 12.1 %
NEUTROPHILS # BLD AUTO: 6.83 X10*3/UL (ref 1.2–7.7)
NEUTROPHILS NFR BLD AUTO: 62.7 %
NRBC BLD-RTO: 0.6 /100 WBCS (ref 0–0)
PLATELET # BLD AUTO: 388 X10*3/UL (ref 150–450)
POTASSIUM SERPL-SCNC: 3.6 MMOL/L (ref 3.5–5.3)
PROT SERPL-MCNC: 6.7 G/DL (ref 6.4–8.2)
RBC # BLD AUTO: 3.52 X10*6/UL (ref 4.5–5.9)
RBC MORPH BLD: NORMAL
RETICS #: 0.38 X10*6/UL (ref 0.02–0.12)
RETICS/RBC NFR AUTO: 10.7 % (ref 0.5–2)
SICKLE CELLS BLD QL SMEAR: NORMAL
SODIUM SERPL-SCNC: 136 MMOL/L (ref 136–145)
TARGETS BLD QL SMEAR: NORMAL
WBC # BLD AUTO: 10.9 X10*3/UL (ref 4.4–11.3)

## 2024-12-28 PROCEDURE — 2500000001 HC RX 250 WO HCPCS SELF ADMINISTERED DRUGS (ALT 637 FOR MEDICARE OP)

## 2024-12-28 PROCEDURE — 85025 COMPLETE CBC W/AUTO DIFF WBC: CPT

## 2024-12-28 PROCEDURE — 2500000004 HC RX 250 GENERAL PHARMACY W/ HCPCS (ALT 636 FOR OP/ED)

## 2024-12-28 PROCEDURE — 83615 LACTATE (LD) (LDH) ENZYME: CPT

## 2024-12-28 PROCEDURE — 85045 AUTOMATED RETICULOCYTE COUNT: CPT

## 2024-12-28 PROCEDURE — 80053 COMPREHEN METABOLIC PANEL: CPT

## 2024-12-28 PROCEDURE — 1170000001 HC PRIVATE ONCOLOGY ROOM DAILY

## 2024-12-28 PROCEDURE — 36415 COLL VENOUS BLD VENIPUNCTURE: CPT

## 2024-12-28 PROCEDURE — 99233 SBSQ HOSP IP/OBS HIGH 50: CPT

## 2024-12-28 RX ORDER — MORPHINE SULFATE 4 MG/ML
6 INJECTION INTRAVENOUS EVERY 2 HOUR PRN
Status: DISCONTINUED | OUTPATIENT
Start: 2024-12-28 | End: 2024-12-30

## 2024-12-28 RX ORDER — BUTALBITAL, ACETAMINOPHEN AND CAFFEINE 50; 325; 40 MG/1; MG/1; MG/1
1 TABLET ORAL ONCE
Status: COMPLETED | OUTPATIENT
Start: 2024-12-28 | End: 2024-12-28

## 2024-12-28 RX ADMIN — MORPHINE SULFATE 6 MG: 4 INJECTION, SOLUTION INTRAMUSCULAR; INTRAVENOUS at 22:14

## 2024-12-28 RX ADMIN — POLYETHYLENE GLYCOL 3350 17 G: 17 POWDER, FOR SOLUTION ORAL at 08:32

## 2024-12-28 RX ADMIN — BACLOFEN 5 MG: 10 TABLET ORAL at 08:32

## 2024-12-28 RX ADMIN — BUTALBITAL, ACETAMINOPHEN, AND CAFFEINE 1 TABLET: 325; 50; 40 TABLET ORAL at 12:21

## 2024-12-28 RX ADMIN — LEVOFLOXACIN 750 MG: 750 TABLET, FILM COATED ORAL at 10:40

## 2024-12-28 RX ADMIN — MORPHINE SULFATE 10 MG: 4 INJECTION INTRAVENOUS at 02:34

## 2024-12-28 RX ADMIN — MORPHINE SULFATE 10 MG: 4 INJECTION INTRAVENOUS at 05:08

## 2024-12-28 RX ADMIN — MORPHINE SULFATE 6 MG: 4 INJECTION, SOLUTION INTRAMUSCULAR; INTRAVENOUS at 10:40

## 2024-12-28 RX ADMIN — BACLOFEN 5 MG: 10 TABLET ORAL at 20:03

## 2024-12-28 RX ADMIN — PANTOPRAZOLE SODIUM 40 MG: 40 TABLET, DELAYED RELEASE ORAL at 08:33

## 2024-12-28 RX ADMIN — BACLOFEN 5 MG: 10 TABLET ORAL at 14:40

## 2024-12-28 RX ADMIN — MORPHINE SULFATE 6 MG: 4 INJECTION, SOLUTION INTRAMUSCULAR; INTRAVENOUS at 20:02

## 2024-12-28 RX ADMIN — MORPHINE SULFATE 6 MG: 4 INJECTION, SOLUTION INTRAMUSCULAR; INTRAVENOUS at 17:41

## 2024-12-28 RX ADMIN — FOLIC ACID 1 MG: 1 TABLET ORAL at 08:32

## 2024-12-28 RX ADMIN — MORPHINE SULFATE 6 MG: 4 INJECTION, SOLUTION INTRAMUSCULAR; INTRAVENOUS at 14:40

## 2024-12-28 RX ADMIN — BACITRACIN ZINC AND POLYMYXIN B SULFATE: 500; 10000 OINTMENT TOPICAL at 15:54

## 2024-12-28 ASSESSMENT — COGNITIVE AND FUNCTIONAL STATUS - GENERAL
MOBILITY SCORE: 24
DAILY ACTIVITIY SCORE: 24
DAILY ACTIVITIY SCORE: 24
MOBILITY SCORE: 24

## 2024-12-28 ASSESSMENT — PAIN - FUNCTIONAL ASSESSMENT
PAIN_FUNCTIONAL_ASSESSMENT: 0-10

## 2024-12-28 ASSESSMENT — PAIN SCALES - GENERAL
PAINLEVEL_OUTOF10: 6
PAINLEVEL_OUTOF10: 7
PAINLEVEL_OUTOF10: 9
PAINLEVEL_OUTOF10: 8
PAINLEVEL_OUTOF10: 7
PAINLEVEL_OUTOF10: 8
PAINLEVEL_OUTOF10: 9
PAINLEVEL_OUTOF10: 8
PAINLEVEL_OUTOF10: 8
PAINLEVEL_OUTOF10: 5 - MODERATE PAIN
PAINLEVEL_OUTOF10: 6
PAINLEVEL_OUTOF10: 8
PAINLEVEL_OUTOF10: 7

## 2024-12-28 NOTE — CARE PLAN
Problem: Pain - Adult  Goal: Verbalizes/displays adequate comfort level or baseline comfort level  Outcome: Progressing     Problem: Safety - Adult  Goal: Free from fall injury  Outcome: Progressing     Problem: Discharge Planning  Goal: Discharge to home or other facility with appropriate resources  Outcome: Progressing     Problem: Pain  Goal: Takes deep breaths with improved pain control throughout the shift  Outcome: Progressing  Goal: Turns in bed with improved pain control throughout the shift  Outcome: Progressing  Goal: Walks with improved pain control throughout the shift  Outcome: Progressing  Goal: Performs ADL's with improved pain control throughout shift  Outcome: Progressing     Problem: Fall/Injury  Goal: Not fall by end of shift  Outcome: Progressing  Goal: Be free from injury by end of the shift  Outcome: Progressing  Goal: Verbalize understanding of personal risk factors for fall in the hospital  Outcome: Progressing  Goal: Verbalize understanding of risk factor reduction measures to prevent injury from fall in the home  Outcome: Progressing  Goal: Use assistive devices by end of the shift  Outcome: Progressing  Goal: Pace activities to prevent fatigue by end of the shift  Outcome: Progressing   The patient's goals for the shift include      The clinical goals for the shift include Patient will rate pain less than 8/10 throughout shift on 12/28/24 at 1900    Over the shift, the patient did make progress towards the following goals. The patient rated pain of 7/10 throughout shift and used alternate methods of pain management. Pt remained safe and free from injury during shift and was able to verbalize needs adequately.

## 2024-12-28 NOTE — CARE PLAN
The patient's goals for the shift include        Problem: Pain - Adult  Goal: Verbalizes/displays adequate comfort level or baseline comfort level  Outcome: Progressing     Problem: Safety - Adult  Goal: Free from fall injury  Outcome: Progressing     Problem: Discharge Planning  Goal: Discharge to home or other facility with appropriate resources  Outcome: Progressing     Problem: Pain  Goal: Takes deep breaths with improved pain control throughout the shift  Outcome: Progressing  Goal: Turns in bed with improved pain control throughout the shift  Outcome: Progressing

## 2024-12-28 NOTE — PROGRESS NOTES
"Joellen Narayan is a 24 y.o. male on day 6 of admission presenting with Sickle cell pain crisis (Multi).    Subjective   Patient seen resting in bed. Reports continue pain this morning primarily located to his flanks but does think that the pain is far improved from previous days. We discussed RUQ US showing cholelithiasis and plan for referral to gen surgery and importance of follow-up. Also discussed decreasing IVP morphine dose this morning to IVP morphing 6mg q2, with plans to rotate v. Discharge on 12/29 pending pain, patient is agreeable to plan. Otherwise denies shortness of breath, chest pain, N/V/D, F/C, H/a.        Objective     Physical Exam  Constitutional:       Appearance: Normal appearance.   HENT:      Head: Normocephalic.      Mouth/Throat:      Mouth: Mucous membranes are moist.   Eyes:      Pupils: Pupils are equal, round, and reactive to light.   Cardiovascular:      Rate and Rhythm: Normal rate and regular rhythm.   Pulmonary:      Effort: Pulmonary effort is normal.      Breath sounds: Normal breath sounds.   Abdominal:      General: Abdomen is flat. Bowel sounds are normal.      Palpations: Abdomen is soft.   Musculoskeletal:         General: Normal range of motion.   Skin:     General: Skin is warm.   Neurological:      General: No focal deficit present.      Mental Status: He is alert.   Psychiatric:         Mood and Affect: Mood normal.         Last Recorded Vitals  Blood pressure 118/62, pulse 94, temperature 36.8 °C (98.2 °F), temperature source Temporal, resp. rate 18, height 1.88 m (6' 2.02\"), weight 77.1 kg (170 lb), SpO2 95%.  Intake/Output last 3 Shifts:  No intake/output data recorded.    Relevant Results                 Scheduled medications  bacitracin zinc-polymyxin B, , Topical, TID  baclofen, 5 mg, oral, TID  enoxaparin, 40 mg, subcutaneous, q24h  folic acid, 1 mg, oral, Daily  levoFLOXacin, 750 mg, oral, q24h  pantoprazole, 40 mg, oral, Daily before breakfast  polyethylene " glycol, 17 g, oral, Daily  sennosides-docusate sodium, 2 tablet, oral, Nightly      Continuous medications     PRN medications  PRN medications: diphenhydrAMINE, morphine, naloxone, ondansetron, prochlorperazine, pseudoephedrine               Assessment/Plan   Assessment & Plan  Sickle cell pain crisis (Multi)    Joellen Narayan is a 23 y.o. 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 CTPE (12/22) without evidence of PE, but showing bilat GGO with new focal nodular opacity in posterior R middle lobe. S/p IV astrid and vanc in ED, s/p IV zosyn q6h (12/22- 12/25). Pt on new O2 requirements with severe chest pain, c/f ACS. Heme consulted (12/22), s/p RBC exchange (12/23). Transitioned to levofloxacin (12/25- planned stop 12/29). Abdominal pain and increased Tbili, given history of choledocholithiasis, RUQ US ordered on 12/27 which showed cholethiasis without cholecystitis, no evidence of biliary dilation. DC pending pain improvement.     12/28 updates:  - RUQ US completed on 12/27 for increased alk phos and Tbili showed cholelithiasis without cholecystitis   - GGT - 74   - Pain continues to improve, decreased dose of IVP morphine to 6mg q2 hours with plans to rotate v. Discharge tomorrow, 12/29     #ACS s/p exchange   - pt presented to the ED (12/22) with chest pain   - pt hypoxic on admit and tachycardic, placed on 2L supp O2 -- bumped up to 3L 12/22  - CXR (12/22) neg for acute process  - CTPE (12/22) neg for PE, showing bilat GGO with new focal nodular opacity in posterior R middle  lobe  - blood cultures x 2 (12/22) NGTD  - UA +1 leuks, neg nitrites (12/22)  - urine culture negative (12/22), urine culture neg   - covid/ flu A/B negative (12/22)  - Strep pneumo, legionella neg (12/22)  - MRSA neg (12/22), Lipase 37 (12/22), troponin 7 (12/22)  - ACS is defined as radiographic evidence of consolidation plus fever > 38.5, New oxygen req, severe chest pain, Respiratory distress or new wheezing.  - Pt meets criteria for acute chest with new O2 requirements (3L), severe chest pain, and radiographic evidence of consolidation  - Heme consulted on admit (12/22) rec RBC exchange   - transfusion med consulted (12/22), s/p RBC exchange 12/23  - IR order placed for 12/23 line placement, removed today 12/24  - s/p IV astrid and IV vanc in ED  - (12/22- 12/25) s/p IV zosyn 3.375 q6h for CAP coverage per attending   - 12/25- current (plan stop 12/29) continue levofloxacin 750mg q24h   - repeat hgb S (12/23) 30.9%  - encourage IS  - 12/24 pt had syncopal episode- found in the bathroom sitting on the floor, pt states he did not hit his head. Vitals normal, BS normal, brought back to bed and he was in good spirits, s/p 500 ml bolus - will continue to monitor  - Orthos neg (12/24)  - Repeat EKG and troponin neg (12/25) for continued chest pain  - CXR 12/25 shows new bibasilar opacity with blunting of bilateral costophrenic angle      # Hgb SS with severe pain   - OARRS reviewed, no aberrancies  - No current care path  - Hgb baseline ~8.5; currently stable, no indication for simple transfusion (12/22)  - Tbili baseline fluctuates ~5-1; Tbili 9.0 on admission (12/22) --> 12/24 tbili 9.4 --> 12/27 tbili pending  - LDH baseline ~500   on admission (12/22) --> 12/24  --> 12/27 LDH pending  - s/p 1L IV NS in ED (12/22)  - s/p IV dilaudid 3mg q2h PRN for pain (12/22)  - s/p IV dilaudid 4mg q2h PRN for severe pain (12/22-12/24)   - 12/24 overnight pt placed on dilaudid PCA with no relief, states that IV  morphine helps with his pain. (12/24) s/p IVP morphine 6mg q2h PRN for severe pain, s/p increased to 8mg IVP morphine q2h PRN (12/24-12/25)  - 12/25 pt still with uncontrolled pain, increased to 10mg IVP morphine q2h PRN (12/25-12/26)  - s/p IVP morphine to 12mg IVP q2h PRN (12/26-12/27)   - s/p  IVP morphine 10mg q2 hours (12/27-12/28)  - 12/28 start IVP morphine 6mg q2 hours   - s/p IV toradol 15mg q6h x 3 days with PPI prophy (12/22-12/24)  - Continue folic acid 1mg daily  - Hgb S (12/22) 85.1, post exchange Hgb S 30.9% (12/23)  - utox + opiates and cannabinoid (12/22)  - PO Zofran PRN for opioid-induced nausea, PO Benadryl PRN for opioid-induced pruritus, Bowel regimen for opioid-induced constipation with DocuSenna 2tabs BID and Miralax daily  - 12/25 s/p 500 ml IV LR for decreased PO intake due to pain     # hx of Priapism  - most recent admission c/b worsening priapism needing urgent [facility] (9/19)  - c/w pseudophed 60mg q8h PRN for priapism      # Recent Choledocholithiasis  - s/p ERCP 7/18/24 with a biliary sphincterotomy where sludge and stones were removed, achieving complete clearance  - Seen by gen surg 8/8/24 Dr. Dove who rec lap cholecystectomy d/t the chance of recurrence of choledocholithiasis  - Surgery has yet to be scheduled, surgery recently cancelled on 10/29  - Tbili 9.0 on admit (12/22), started to downtrend, now uptrending as of 12/27, Tbili 9.3 (12/27)   - RUQ US completed on 12/27 for increased alk phos and Tbili showed cholelithiasis without cholecystitis   - GGT - 74 (12/22)   - new general surgery appointment requested on 12/28      # Nodular lymphocyte predominant Hodgkins lymphoma (NLPHL)   - Primary Oncologist: Dr. Stoll  - Enlarged lymph nodes noted 4/1/22  - RUQ US (11/14/22) with mildly enlarged LNs in the region of the kavin hepatis  - MRI liver (11/16/22) showed re-demonstration of bulky retroperitoneal lymphadenopathy and kavin hepatic lymphadenopathy    - (11/18/22) lymph  "node biopsy showed atypical lymphoid infiltrate. Reviewed by Hemepath board, insufficient for lymphoma diagnosis  - PET/CT (12/6/22) showing retroperitoneal lymphadenopathy  - Followed up with Dr. Stoll (12/16/22) with plan for surg/onc consult for large tissue bx but patient missed apt and was lost to follow up  - Requested new apt with Dr. Ronnie Marte 6/19/23, patient was not seen and lost to follow up  - CT a/p (11/28/23) increased size of retroperitoneal lymph nodes reflecting extramedullary hematopoiesis I/s/o sickle cell vs lymphoma  - Paraaortic LN bx via IR (11/30/23) consistent with NLPHL. Flow: no clonal B cell or T cell population identified, lymphocyte 95%, CD3+CD4+ 68%, CD3+CD8+ 7%, CD19+ 24%  - Elevated LDH/bili partially from sickle cell disease   - Chemotherapy (R-CHOP) was discussed with primary oncologist Dr. Stoll, and decision was made to simplify his chemotherapy to Rituxan and prednisone q3 weeks mainly due to frequent sickle cell crisis  - Current chemo regimen: Rituxan and prednisone q3 weeks (C1 1/18/24, C2 2/8/24, Missed C3 d/t sickle cell pain crisis, C4 4/4/24, C5 5/16/24, C6 6/7/24)  - Per pt no longer taking Acyclovir 400mg BID prophy   - PET CT (7/25) overall showing great response to treatment with persistent residual viable disease involving a left common iliac node  - PET/CT (11/18) showing Interval increased metabolic activity within upper abdominal and  bilateral inguinal lymph nodes compatible with interval worsening of lymphomatous disease  - Last FUV with Dr. Stoll 11/21, per note no current treatment and will continue with observation at this time, plan for repeat CT/PET in 3 months     # Hx of nocturnal oxygen dependence and hypoxia on room air  - Pt currently has home 2L supp O2   - Wean as able, encourage incentive spirometry  - Pulm FUV 8/8- \"Given his history of sickle cell disease, it is important to ensure he has formal sleep testing to look at desaturations and " "presence of sleep apnea despite not having a convincing history/body habitus typical for suspecting sleep apnea- patients with sickle cell have a higher prevalence of sleep disorders including nocturnal hypoxemia\"--> rec sleep referral for formal testing if deemed appropriate, ABG and O2 destat testing, and TTE with bubble study  - TTE 8/23 showing EF 60-65%, severely dilated LV and LA, + intrapulmonary shunting    - pt currently on 3L supp O2, wean as able  - encourage IS     DVT prophy: Lovenox subcutaneous, SCDs, encourage ambulation     DISPO:  - Full code, confirmed on admit  - Discharge home resumed home O2 pending improvement in pain  - SUSIE Narayan (Parent) [phone]  - sickle cell FUV 1/8, gen surg requested        I spent 60 minutes in the professional and overall care of this patient.    Assessment and plan as above discussed with attending physician, Dr. Soheila Stoll, PA-C      "

## 2024-12-29 VITALS
DIASTOLIC BLOOD PRESSURE: 66 MMHG | SYSTOLIC BLOOD PRESSURE: 117 MMHG | HEIGHT: 74 IN | HEART RATE: 73 BPM | WEIGHT: 170 LBS | BODY MASS INDEX: 21.82 KG/M2 | OXYGEN SATURATION: 100 % | RESPIRATION RATE: 16 BRPM | TEMPERATURE: 99.1 F

## 2024-12-29 LAB
ALBUMIN SERPL BCP-MCNC: 3.8 G/DL (ref 3.4–5)
ALP SERPL-CCNC: 186 U/L (ref 33–120)
ALT SERPL W P-5'-P-CCNC: 37 U/L (ref 10–52)
ANION GAP SERPL CALC-SCNC: 12 MMOL/L (ref 10–20)
AST SERPL W P-5'-P-CCNC: 50 U/L (ref 9–39)
BASOPHILS # BLD MANUAL: 0 X10*3/UL (ref 0–0.1)
BASOPHILS NFR BLD MANUAL: 0 %
BILIRUB SERPL-MCNC: 5.5 MG/DL (ref 0–1.2)
BUN SERPL-MCNC: 6 MG/DL (ref 6–23)
CALCIUM SERPL-MCNC: 9.1 MG/DL (ref 8.6–10.6)
CHLORIDE SERPL-SCNC: 105 MMOL/L (ref 98–107)
CO2 SERPL-SCNC: 26 MMOL/L (ref 21–32)
CREAT SERPL-MCNC: 0.5 MG/DL (ref 0.5–1.3)
EGFRCR SERPLBLD CKD-EPI 2021: >90 ML/MIN/1.73M*2
EOSINOPHIL # BLD MANUAL: 0.4 X10*3/UL (ref 0–0.7)
EOSINOPHIL NFR BLD MANUAL: 4.3 %
ERYTHROCYTE [DISTWIDTH] IN BLOOD BY AUTOMATED COUNT: 21.6 % (ref 11.5–14.5)
GLUCOSE SERPL-MCNC: 81 MG/DL (ref 74–99)
HCT VFR BLD AUTO: 26.5 % (ref 41–52)
HGB BLD-MCNC: 8.9 G/DL (ref 13.5–17.5)
HGB RETIC QN: 28 PG (ref 28–38)
IMM GRANULOCYTES # BLD AUTO: 0.07 X10*3/UL (ref 0–0.7)
IMM GRANULOCYTES NFR BLD AUTO: 0.7 % (ref 0–0.9)
IMMATURE RETIC FRACTION: 24.2 %
LDH SERPL L TO P-CCNC: 379 U/L (ref 84–246)
LYMPHOCYTES # BLD MANUAL: 2.97 X10*3/UL (ref 1.2–4.8)
LYMPHOCYTES NFR BLD MANUAL: 31.6 %
MCH RBC QN AUTO: 27.6 PG (ref 26–34)
MCHC RBC AUTO-ENTMCNC: 33.6 G/DL (ref 32–36)
MCV RBC AUTO: 82 FL (ref 80–100)
MONOCYTES # BLD MANUAL: 0.64 X10*3/UL (ref 0.1–1)
MONOCYTES NFR BLD MANUAL: 6.8 %
NEUTS SEG # BLD MANUAL: 5.39 X10*3/UL (ref 1.2–7)
NEUTS SEG NFR BLD MANUAL: 57.3 %
NRBC BLD-RTO: 0.7 /100 WBCS (ref 0–0)
PLATELET # BLD AUTO: 483 X10*3/UL (ref 150–450)
POLYCHROMASIA BLD QL SMEAR: NORMAL
POTASSIUM SERPL-SCNC: 3.5 MMOL/L (ref 3.5–5.3)
PROT SERPL-MCNC: 6.4 G/DL (ref 6.4–8.2)
RBC # BLD AUTO: 3.22 X10*6/UL (ref 4.5–5.9)
RBC MORPH BLD: NORMAL
RETICS #: 0.33 X10*6/UL (ref 0.02–0.12)
RETICS/RBC NFR AUTO: 10.4 % (ref 0.5–2)
SICKLE CELLS BLD QL SMEAR: NORMAL
SODIUM SERPL-SCNC: 139 MMOL/L (ref 136–145)
TARGETS BLD QL SMEAR: NORMAL
TOTAL CELLS COUNTED BLD: 117
WBC # BLD AUTO: 9.4 X10*3/UL (ref 4.4–11.3)

## 2024-12-29 PROCEDURE — 85027 COMPLETE CBC AUTOMATED: CPT

## 2024-12-29 PROCEDURE — 2500000001 HC RX 250 WO HCPCS SELF ADMINISTERED DRUGS (ALT 637 FOR MEDICARE OP)

## 2024-12-29 PROCEDURE — 85007 BL SMEAR W/DIFF WBC COUNT: CPT

## 2024-12-29 PROCEDURE — 80053 COMPREHEN METABOLIC PANEL: CPT

## 2024-12-29 PROCEDURE — 1170000001 HC PRIVATE ONCOLOGY ROOM DAILY

## 2024-12-29 PROCEDURE — 2500000004 HC RX 250 GENERAL PHARMACY W/ HCPCS (ALT 636 FOR OP/ED)

## 2024-12-29 PROCEDURE — 85045 AUTOMATED RETICULOCYTE COUNT: CPT

## 2024-12-29 PROCEDURE — 83615 LACTATE (LD) (LDH) ENZYME: CPT

## 2024-12-29 PROCEDURE — 36415 COLL VENOUS BLD VENIPUNCTURE: CPT

## 2024-12-29 PROCEDURE — 99233 SBSQ HOSP IP/OBS HIGH 50: CPT

## 2024-12-29 RX ADMIN — MORPHINE SULFATE 6 MG: 4 INJECTION, SOLUTION INTRAMUSCULAR; INTRAVENOUS at 16:20

## 2024-12-29 RX ADMIN — BACLOFEN 5 MG: 10 TABLET ORAL at 16:16

## 2024-12-29 RX ADMIN — PANTOPRAZOLE SODIUM 40 MG: 40 TABLET, DELAYED RELEASE ORAL at 06:49

## 2024-12-29 RX ADMIN — FOLIC ACID 1 MG: 1 TABLET ORAL at 08:55

## 2024-12-29 RX ADMIN — BACLOFEN 5 MG: 10 TABLET ORAL at 20:05

## 2024-12-29 RX ADMIN — MORPHINE SULFATE 6 MG: 4 INJECTION, SOLUTION INTRAMUSCULAR; INTRAVENOUS at 00:32

## 2024-12-29 RX ADMIN — MORPHINE SULFATE 6 MG: 4 INJECTION, SOLUTION INTRAMUSCULAR; INTRAVENOUS at 08:55

## 2024-12-29 RX ADMIN — MORPHINE SULFATE 6 MG: 4 INJECTION, SOLUTION INTRAMUSCULAR; INTRAVENOUS at 02:42

## 2024-12-29 RX ADMIN — MORPHINE SULFATE 6 MG: 4 INJECTION, SOLUTION INTRAMUSCULAR; INTRAVENOUS at 06:49

## 2024-12-29 RX ADMIN — MORPHINE SULFATE 6 MG: 4 INJECTION, SOLUTION INTRAMUSCULAR; INTRAVENOUS at 13:43

## 2024-12-29 RX ADMIN — SENNOSIDES AND DOCUSATE SODIUM 2 TABLET: 50; 8.6 TABLET ORAL at 20:05

## 2024-12-29 RX ADMIN — MORPHINE SULFATE 6 MG: 4 INJECTION, SOLUTION INTRAMUSCULAR; INTRAVENOUS at 20:05

## 2024-12-29 RX ADMIN — MORPHINE SULFATE 6 MG: 4 INJECTION, SOLUTION INTRAMUSCULAR; INTRAVENOUS at 22:19

## 2024-12-29 RX ADMIN — MORPHINE SULFATE 6 MG: 4 INJECTION, SOLUTION INTRAMUSCULAR; INTRAVENOUS at 11:39

## 2024-12-29 RX ADMIN — BACLOFEN 5 MG: 10 TABLET ORAL at 08:55

## 2024-12-29 RX ADMIN — MORPHINE SULFATE 6 MG: 4 INJECTION, SOLUTION INTRAMUSCULAR; INTRAVENOUS at 04:45

## 2024-12-29 ASSESSMENT — PAIN SCALES - GENERAL
PAINLEVEL_OUTOF10: 7
PAINLEVEL_OUTOF10: 7
PAINLEVEL_OUTOF10: 8
PAINLEVEL_OUTOF10: 7
PAINLEVEL_OUTOF10: 8
PAINLEVEL_OUTOF10: 8
PAINLEVEL_OUTOF10: 6
PAINLEVEL_OUTOF10: 5 - MODERATE PAIN
PAINLEVEL_OUTOF10: 6
PAINLEVEL_OUTOF10: 6
PAINLEVEL_OUTOF10: 8
PAINLEVEL_OUTOF10: 6
PAINLEVEL_OUTOF10: 6
PAINLEVEL_OUTOF10: 8
PAINLEVEL_OUTOF10: 7
PAINLEVEL_OUTOF10: 7
PAINLEVEL_OUTOF10: 8
PAINLEVEL_OUTOF10: 7
PAINLEVEL_OUTOF10: 6
PAINLEVEL_OUTOF10: 7
PAINLEVEL_OUTOF10: 6

## 2024-12-29 ASSESSMENT — PAIN - FUNCTIONAL ASSESSMENT

## 2024-12-29 ASSESSMENT — COGNITIVE AND FUNCTIONAL STATUS - GENERAL
DAILY ACTIVITIY SCORE: 24
MOBILITY SCORE: 24

## 2024-12-29 NOTE — CARE PLAN
The patient's goals for the shift include      The clinical goals for the shift include pt will remain VSS and HDs throughout shift 12/29/24 0700      Problem: Pain - Adult  Goal: Verbalizes/displays adequate comfort level or baseline comfort level  Outcome: Progressing     Problem: Safety - Adult  Goal: Free from fall injury  Outcome: Progressing     Problem: Discharge Planning  Goal: Discharge to home or other facility with appropriate resources  Outcome: Progressing     Problem: Pain  Goal: Takes deep breaths with improved pain control throughout the shift  Outcome: Progressing  Goal: Turns in bed with improved pain control throughout the shift  Outcome: Progressing  Goal: Walks with improved pain control throughout the shift  Outcome: Progressing  Goal: Performs ADL's with improved pain control throughout shift  Outcome: Progressing     Problem: Fall/Injury  Goal: Not fall by end of shift  Outcome: Progressing  Goal: Be free from injury by end of the shift  Outcome: Progressing  Goal: Verbalize understanding of personal risk factors for fall in the hospital  Outcome: Progressing  Goal: Verbalize understanding of risk factor reduction measures to prevent injury from fall in the home  Outcome: Progressing  Goal: Use assistive devices by end of the shift  Outcome: Progressing  Goal: Pace activities to prevent fatigue by end of the shift  Outcome: Progressing

## 2024-12-29 NOTE — PROGRESS NOTES
"Joellen Narayan is a 24 y.o. male on day 7 of admission presenting with Sickle cell pain crisis (Multi).    Subjective   Patient seen resting in bed. Reports worsened pain this morning located in his flank as well as chest pain that has been resolved but returned overnight, currently rates it as a 7/10. Chest pain feels typical of his sickle cell pain. We discussed continuing IVP morphine 6mg q2 today due to the increased pain and plan to rotate with home oxycodone tomorrow, 12/30. Patient agreeable. He is eating and drinking well, having bowel movements. Otherwise denies shortness of breath, chest pain, abdominal pain, N/V/D, F/c, H/a.        Objective     Physical Exam  Constitutional:       Appearance: Normal appearance.   HENT:      Head: Normocephalic.      Nose: Nose normal.      Mouth/Throat:      Mouth: Mucous membranes are moist.   Eyes:      Pupils: Pupils are equal, round, and reactive to light.   Cardiovascular:      Rate and Rhythm: Normal rate and regular rhythm.      Pulses: Normal pulses.      Heart sounds: Normal heart sounds.   Pulmonary:      Effort: Pulmonary effort is normal.      Breath sounds: Normal breath sounds.      Comments: On 2L NC  Abdominal:      General: Abdomen is flat. Bowel sounds are normal.      Palpations: Abdomen is soft.   Musculoskeletal:         General: Normal range of motion.   Skin:     General: Skin is warm.   Neurological:      General: No focal deficit present.      Mental Status: He is alert.   Psychiatric:         Mood and Affect: Mood normal.         Last Recorded Vitals  Blood pressure 125/71, pulse 73, temperature 37 °C (98.6 °F), temperature source Temporal, resp. rate 18, height 1.88 m (6' 2.02\"), weight 77.1 kg (170 lb), SpO2 98%.  Intake/Output last 3 Shifts:  I/O last 3 completed shifts:  In: 1160 (15 mL/kg) [P.O.:1160]  Out: 750 (9.7 mL/kg) [Urine:750 (0.3 mL/kg/hr)]  Weight: 77.1 kg     Relevant Results                 Scheduled medications  bacitracin " zinc-polymyxin B, , Topical, TID  baclofen, 5 mg, oral, TID  enoxaparin, 40 mg, subcutaneous, q24h  folic acid, 1 mg, oral, Daily  pantoprazole, 40 mg, oral, Daily before breakfast  polyethylene glycol, 17 g, oral, Daily  sennosides-docusate sodium, 2 tablet, oral, Nightly      Continuous medications     PRN medications  PRN medications: diphenhydrAMINE, morphine, naloxone, ondansetron, prochlorperazine, pseudoephedrine               Assessment/Plan   Assessment & Plan  Sickle cell pain crisis (Multi)    Joellen Narayan is a 23 y.o. 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 CTPE (12/22) without evidence of PE, but showing bilat GGO with new focal nodular opacity in posterior R middle lobe. S/p IV astrid and vanc in ED, s/p IV zosyn q6h (12/22- 12/25). Pt on new O2 requirements with severe chest pain, c/f ACS. Heme consulted (12/22), s/p RBC exchange (12/23). Transitioned to levofloxacin (12/25- planned stop 12/29). Abdominal pain and increased Tbili, given history of choledocholithiasis, RUQ US ordered on 12/27 which showed cholethiasis without cholecystitis, no evidence of biliary dilation. DC pending pain improvement.     12/29 updates:  - worsened pain today, continue IVP morphine 6mg q2 hours with plans to rotate with home oxycodone dose on 12/30      #ACS s/p exchange   - pt presented to the ED (12/22) with chest pain   - pt hypoxic on admit and tachycardic, placed on 2L supp O2 -- bumped up to 3L 12/22  - CXR (12/22) neg for acute process  - CTPE (12/22) neg for PE, showing  bilat GGO with new focal nodular opacity in posterior R middle lobe  - blood cultures x 2 (12/22) NGTD  - UA +1 leuks, neg nitrites (12/22)  - urine culture negative (12/22), urine culture neg   - covid/ flu A/B negative (12/22)  - Strep pneumo, legionella neg (12/22)  - MRSA neg (12/22), Lipase 37 (12/22), troponin 7 (12/22)  - ACS is defined as radiographic evidence of consolidation plus fever > 38.5, New oxygen req, severe chest pain, Respiratory distress or new wheezing.  - Pt meets criteria for acute chest with new O2 requirements (3L), severe chest pain, and radiographic evidence of consolidation  - Heme consulted on admit (12/22) rec RBC exchange   - transfusion med consulted (12/22), s/p RBC exchange 12/23  - IR order placed for 12/23 line placement, removed today 12/24  - s/p IV astrid and IV vanc in ED  - (12/22- 12/25) s/p IV zosyn 3.375 q6h for CAP coverage per attending   - 12/25- current (plan stop 12/29) continue levofloxacin 750mg q24h   - repeat hgb S (12/23) 30.9%  - encourage IS  - 12/24 pt had syncopal episode- found in the bathroom sitting on the floor, pt states he did not hit his head. Vitals normal, BS normal, brought back to bed and he was in good spirits, s/p 500 ml bolus - will continue to monitor  - Orthos neg (12/24)  - Repeat EKG and troponin neg (12/25) for continued chest pain  - CXR 12/25 shows new bibasilar opacity with blunting of bilateral costophrenic angle      # Hgb SS with severe pain   - OARRS reviewed, no aberrancies  - No current care path  - Hgb baseline ~8.5; currently stable, no indication for simple transfusion (12/22)  - Tbili baseline fluctuates ~5-1; Tbili 9.0 on admission (12/22) --> 12/24 tbili 9.4 --> 12/27 tbili pending  - LDH baseline ~500   on admission (12/22) --> 12/24  --> 12/27 LDH pending  - s/p 1L IV NS in ED (12/22)  - s/p IV dilaudid 3mg q2h PRN for pain (12/22)  - s/p IV dilaudid 4mg q2h PRN for severe pain (12/22-12/24)   - 12/24 overnight  pt placed on dilaudid PCA with no relief, states that IV morphine helps with his pain. (12/24) s/p IVP morphine 6mg q2h PRN for severe pain, s/p increased to 8mg IVP morphine q2h PRN (12/24-12/25)  - 12/25 pt still with uncontrolled pain, increased to 10mg IVP morphine q2h PRN (12/25-12/26)  - s/p IVP morphine to 12mg IVP q2h PRN (12/26-12/27)   - s/p  IVP morphine 10mg q2 hours (12/27-12/28)  - continue IVP morphine 6mg q2 hours (12/28-current)   - s/p IV toradol 15mg q6h x 3 days with PPI prophy (12/22-12/24)  - Continue folic acid 1mg daily  - Hgb S (12/22) 85.1, post exchange Hgb S 30.9% (12/23)  - utox + opiates and cannabinoid (12/22)  - PO Zofran PRN for opioid-induced nausea, PO Benadryl PRN for opioid-induced pruritus, Bowel regimen for opioid-induced constipation with DocuSenna 2tabs BID and Miralax daily  - 12/25 s/p 500 ml IV LR for decreased PO intake due to pain     # hx of Priapism  - most recent admission c/b worsening priapism needing urgent [facility]Exchange (9/19)  - c/w pseudophed 60mg q8h PRN for priapism      # Recent Choledocholithiasis  - s/p ERCP 7/18/24 with a biliary sphincterotomy where sludge and stones were removed, achieving complete clearance  - Seen by gen surg 8/8/24 Dr. Dove who rec lap cholecystectomy d/t the chance of recurrence of choledocholithiasis  - Surgery has yet to be scheduled, surgery recently cancelled on 10/29  - Tbili 9.0 on admit (12/22), started to downtrend, now uptrending as of 12/27, Tbili 9.3 (12/27)   - RUQ US completed on 12/27 for increased alk phos and Tbili showed cholelithiasis without cholecystitis   - GGT - 74 (12/22)   - new general surgery appointment requested on 12/28      # Nodular lymphocyte predominant Hodgkins lymphoma (NLPHL)   - Primary Oncologist: Dr. Stoll  - Enlarged lymph nodes noted 4/1/22  - RUQ US (11/14/22) with mildly enlarged LNs in the region of the kavin hepatis  - MRI liver (11/16/22) showed re-demonstration of bulky retroperitoneal  "lymphadenopathy and kavin hepatic lymphadenopathy    - (11/18/22) lymph node biopsy showed atypical lymphoid infiltrate. Reviewed by Hemepath board, insufficient for lymphoma diagnosis  - PET/CT (12/6/22) showing retroperitoneal lymphadenopathy  - Followed up with Dr. Stoll (12/16/22) with plan for surg/onc consult for large tissue bx but patient missed apt and was lost to follow up  - Requested new apt with Dr. Ronnie Marte 6/19/23, patient was not seen and lost to follow up  - CT a/p (11/28/23) increased size of retroperitoneal lymph nodes reflecting extramedullary hematopoiesis I/s/o sickle cell vs lymphoma  - Paraaortic LN bx via IR (11/30/23) consistent with NLPHL. Flow: no clonal B cell or T cell population identified, lymphocyte 95%, CD3+CD4+ 68%, CD3+CD8+ 7%, CD19+ 24%  - Elevated LDH/bili partially from sickle cell disease   - Chemotherapy (R-CHOP) was discussed with primary oncologist Dr. Stoll, and decision was made to simplify his chemotherapy to Rituxan and prednisone q3 weeks mainly due to frequent sickle cell crisis  - Current chemo regimen: Rituxan and prednisone q3 weeks (C1 1/18/24, C2 2/8/24, Missed C3 d/t sickle cell pain crisis, C4 4/4/24, C5 5/16/24, C6 6/7/24)  - Per pt no longer taking Acyclovir 400mg BID prophy   - PET CT (7/25) overall showing great response to treatment with persistent residual viable disease involving a left common iliac node  - PET/CT (11/18) showing Interval increased metabolic activity within upper abdominal and  bilateral inguinal lymph nodes compatible with interval worsening of lymphomatous disease  - Last FUV with Dr. Stoll 11/21, per note no current treatment and will continue with observation at this time, plan for repeat CT/PET in 3 months     # Hx of nocturnal oxygen dependence and hypoxia on room air  - Pt currently has home 2L supp O2   - Wean as able, encourage incentive spirometry  - Pulm FUV 8/8- \"Given his history of sickle cell disease, it is " "important to ensure he has formal sleep testing to look at desaturations and presence of sleep apnea despite not having a convincing history/body habitus typical for suspecting sleep apnea- patients with sickle cell have a higher prevalence of sleep disorders including nocturnal hypoxemia\"--> rec sleep referral for formal testing if deemed appropriate, ABG and O2 destat testing, and TTE with bubble study  - TTE 8/23 showing EF 60-65%, severely dilated LV and LA, + intrapulmonary shunting    - pt currently on 3L supp O2, wean as able  - encourage IS     DVT prophy: Lovenox subcutaneous, SCDs, encourage ambulation     DISPO:  - Full code, confirmed on admit  - Discharge home resumed home O2 pending improvement in pain  - SUSIE Narayan (Parent) [phone]  - sickle cell FUV 1/8, gen surg requested        I spent 60 minutes in the professional and overall care of this patient.    Assessment and plan as above discussed with attending physician, Dr. Soheila Stoll, PAKenzieC      "

## 2024-12-29 NOTE — CARE PLAN
Problem: Pain - Adult  Goal: Verbalizes/displays adequate comfort level or baseline comfort level  Outcome: Progressing     Problem: Safety - Adult  Goal: Free from fall injury  Outcome: Progressing     Problem: Discharge Planning  Goal: Discharge to home or other facility with appropriate resources  Outcome: Progressing     Problem: Pain  Goal: Takes deep breaths with improved pain control throughout the shift  Outcome: Progressing  Goal: Turns in bed with improved pain control throughout the shift  Outcome: Progressing  Goal: Walks with improved pain control throughout the shift  Outcome: Progressing  Goal: Performs ADL's with improved pain control throughout shift  Outcome: Progressing     Problem: Fall/Injury  Goal: Not fall by end of shift  Outcome: Progressing  Goal: Be free from injury by end of the shift  Outcome: Progressing  Goal: Verbalize understanding of personal risk factors for fall in the hospital  Outcome: Progressing  Goal: Verbalize understanding of risk factor reduction measures to prevent injury from fall in the home  Outcome: Progressing  Goal: Use assistive devices by end of the shift  Outcome: Progressing  Goal: Pace activities to prevent fatigue by end of the shift  Outcome: Progressing   The patient's goals for the shift include      The clinical goals for the shift include Pt will remain safe in room and use call light during shift on 12/29/24 @ 1930    Pt continued to report pain in chest and side rating at a 6-7/10. Pt tolerated 6mg morphine q2 pain regimen. VSS.

## 2024-12-30 LAB
ALBUMIN SERPL BCP-MCNC: 3.9 G/DL (ref 3.4–5)
ALP SERPL-CCNC: 227 U/L (ref 33–120)
ALT SERPL W P-5'-P-CCNC: 53 U/L (ref 10–52)
ANION GAP SERPL CALC-SCNC: 11 MMOL/L (ref 10–20)
AST SERPL W P-5'-P-CCNC: 66 U/L (ref 9–39)
BASOPHILS # BLD AUTO: 0.07 X10*3/UL (ref 0–0.1)
BASOPHILS NFR BLD AUTO: 0.7 %
BILIRUB SERPL-MCNC: 4.5 MG/DL (ref 0–1.2)
BUN SERPL-MCNC: 8 MG/DL (ref 6–23)
BURR CELLS BLD QL SMEAR: NORMAL
CALCIUM SERPL-MCNC: 9.5 MG/DL (ref 8.6–10.6)
CHLORIDE SERPL-SCNC: 106 MMOL/L (ref 98–107)
CO2 SERPL-SCNC: 26 MMOL/L (ref 21–32)
CREAT SERPL-MCNC: 0.53 MG/DL (ref 0.5–1.3)
EGFRCR SERPLBLD CKD-EPI 2021: >90 ML/MIN/1.73M*2
EOSINOPHIL # BLD AUTO: 0.41 X10*3/UL (ref 0–0.7)
EOSINOPHIL NFR BLD AUTO: 4.4 %
ERYTHROCYTE [DISTWIDTH] IN BLOOD BY AUTOMATED COUNT: 21.9 % (ref 11.5–14.5)
GLUCOSE SERPL-MCNC: 87 MG/DL (ref 74–99)
HCT VFR BLD AUTO: 28.3 % (ref 41–52)
HGB BLD-MCNC: 9.4 G/DL (ref 13.5–17.5)
HGB RETIC QN: 28 PG (ref 28–38)
IMM GRANULOCYTES # BLD AUTO: 0.07 X10*3/UL (ref 0–0.7)
IMM GRANULOCYTES NFR BLD AUTO: 0.7 % (ref 0–0.9)
IMMATURE RETIC FRACTION: 27.7 %
LDH SERPL L TO P-CCNC: 379 U/L (ref 84–246)
LYMPHOCYTES # BLD AUTO: 1.85 X10*3/UL (ref 1.2–4.8)
LYMPHOCYTES NFR BLD AUTO: 19.7 %
MCH RBC QN AUTO: 27.6 PG (ref 26–34)
MCHC RBC AUTO-ENTMCNC: 33.2 G/DL (ref 32–36)
MCV RBC AUTO: 83 FL (ref 80–100)
MONOCYTES # BLD AUTO: 1.14 X10*3/UL (ref 0.1–1)
MONOCYTES NFR BLD AUTO: 12.2 %
NEUTROPHILS # BLD AUTO: 5.84 X10*3/UL (ref 1.2–7.7)
NEUTROPHILS NFR BLD AUTO: 62.3 %
NRBC BLD-RTO: 0.5 /100 WBCS (ref 0–0)
OVALOCYTES BLD QL SMEAR: NORMAL
PLATELET # BLD AUTO: 539 X10*3/UL (ref 150–450)
POLYCHROMASIA BLD QL SMEAR: NORMAL
POTASSIUM SERPL-SCNC: 4.2 MMOL/L (ref 3.5–5.3)
PROT SERPL-MCNC: 6.7 G/DL (ref 6.4–8.2)
RBC # BLD AUTO: 3.4 X10*6/UL (ref 4.5–5.9)
RBC MORPH BLD: NORMAL
RETICS #: 0.38 X10*6/UL (ref 0.02–0.12)
RETICS/RBC NFR AUTO: 11.1 % (ref 0.5–2)
SODIUM SERPL-SCNC: 139 MMOL/L (ref 136–145)
TARGETS BLD QL SMEAR: NORMAL
WBC # BLD AUTO: 9.4 X10*3/UL (ref 4.4–11.3)

## 2024-12-30 PROCEDURE — 1170000001 HC PRIVATE ONCOLOGY ROOM DAILY

## 2024-12-30 PROCEDURE — 2500000001 HC RX 250 WO HCPCS SELF ADMINISTERED DRUGS (ALT 637 FOR MEDICARE OP)

## 2024-12-30 PROCEDURE — 80053 COMPREHEN METABOLIC PANEL: CPT

## 2024-12-30 PROCEDURE — 85025 COMPLETE CBC W/AUTO DIFF WBC: CPT

## 2024-12-30 PROCEDURE — 36415 COLL VENOUS BLD VENIPUNCTURE: CPT

## 2024-12-30 PROCEDURE — 99233 SBSQ HOSP IP/OBS HIGH 50: CPT

## 2024-12-30 PROCEDURE — 83615 LACTATE (LD) (LDH) ENZYME: CPT

## 2024-12-30 PROCEDURE — 2500000004 HC RX 250 GENERAL PHARMACY W/ HCPCS (ALT 636 FOR OP/ED)

## 2024-12-30 PROCEDURE — 85045 AUTOMATED RETICULOCYTE COUNT: CPT

## 2024-12-30 PROCEDURE — 99232 SBSQ HOSP IP/OBS MODERATE 35: CPT

## 2024-12-30 RX ORDER — MORPHINE SULFATE 4 MG/ML
6 INJECTION INTRAVENOUS EVERY 4 HOURS PRN
Status: DISCONTINUED | OUTPATIENT
Start: 2024-12-30 | End: 2024-12-31 | Stop reason: HOSPADM

## 2024-12-30 RX ORDER — OXYCODONE HYDROCHLORIDE 10 MG/1
20 TABLET ORAL EVERY 4 HOURS PRN
Status: DISCONTINUED | OUTPATIENT
Start: 2024-12-30 | End: 2024-12-31 | Stop reason: HOSPADM

## 2024-12-30 RX ADMIN — BACLOFEN 5 MG: 10 TABLET ORAL at 16:03

## 2024-12-30 RX ADMIN — MORPHINE SULFATE 6 MG: 4 INJECTION, SOLUTION INTRAMUSCULAR; INTRAVENOUS at 00:50

## 2024-12-30 RX ADMIN — MORPHINE SULFATE 6 MG: 4 INJECTION INTRAVENOUS at 23:30

## 2024-12-30 RX ADMIN — FOLIC ACID 1 MG: 1 TABLET ORAL at 08:21

## 2024-12-30 RX ADMIN — OXYCODONE HYDROCHLORIDE 20 MG: 10 TABLET ORAL at 13:23

## 2024-12-30 RX ADMIN — BACLOFEN 5 MG: 10 TABLET ORAL at 08:21

## 2024-12-30 RX ADMIN — PANTOPRAZOLE SODIUM 40 MG: 40 TABLET, DELAYED RELEASE ORAL at 08:25

## 2024-12-30 RX ADMIN — MORPHINE SULFATE 6 MG: 4 INJECTION, SOLUTION INTRAMUSCULAR; INTRAVENOUS at 03:44

## 2024-12-30 RX ADMIN — BACLOFEN 5 MG: 10 TABLET ORAL at 21:30

## 2024-12-30 RX ADMIN — OXYCODONE HYDROCHLORIDE 20 MG: 10 TABLET ORAL at 21:30

## 2024-12-30 RX ADMIN — MORPHINE SULFATE 6 MG: 4 INJECTION, SOLUTION INTRAMUSCULAR; INTRAVENOUS at 08:21

## 2024-12-30 ASSESSMENT — PAIN - FUNCTIONAL ASSESSMENT
PAIN_FUNCTIONAL_ASSESSMENT: 0-10

## 2024-12-30 ASSESSMENT — COGNITIVE AND FUNCTIONAL STATUS - GENERAL
DAILY ACTIVITIY SCORE: 24
MOBILITY SCORE: 24
DAILY ACTIVITIY SCORE: 24
MOBILITY SCORE: 24

## 2024-12-30 ASSESSMENT — PAIN SCALES - GENERAL
PAINLEVEL_OUTOF10: 6
PAINLEVEL_OUTOF10: 6
PAINLEVEL_OUTOF10: 8
PAINLEVEL_OUTOF10: 7
PAINLEVEL_OUTOF10: 5 - MODERATE PAIN
PAINLEVEL_OUTOF10: 7
PAINLEVEL_OUTOF10: 6
PAINLEVEL_OUTOF10: 8
PAINLEVEL_OUTOF10: 6
PAINLEVEL_OUTOF10: 5 - MODERATE PAIN
PAINLEVEL_OUTOF10: 7
PAINLEVEL_OUTOF10: 7

## 2024-12-30 ASSESSMENT — PAIN DESCRIPTION - LOCATION: LOCATION: OTHER (COMMENT)

## 2024-12-30 ASSESSMENT — PAIN DESCRIPTION - ORIENTATION: ORIENTATION: LEFT

## 2024-12-30 NOTE — PROGRESS NOTES
"Joellen Narayan is a 24 y.o. male on day 8 of admission presenting with Sickle cell pain crisis (Multi).    Subjective   Pt resting in bed at time of exam. Pt declined integrative heme/onc interventions, stating he \"is okay\".     Objective     Physical Exam  Vitals reviewed.   Constitutional:       General: He is not in acute distress.     Appearance: Normal appearance. He is normal weight. He is ill-appearing.   HENT:      Head: Normocephalic and atraumatic.      Nose: Nose normal.      Mouth/Throat:      Mouth: Mucous membranes are moist.   Eyes:      Extraocular Movements: Extraocular movements intact.      Pupils: Pupils are equal, round, and reactive to light.   Cardiovascular:      Rate and Rhythm: Normal rate.   Pulmonary:      Effort: Pulmonary effort is normal.   Abdominal:      General: Abdomen is flat.      Palpations: Abdomen is soft.   Skin:     General: Skin is warm and dry.   Neurological:      General: No focal deficit present.      Mental Status: He is alert and oriented to person, place, and time. Mental status is at baseline.   Psychiatric:         Mood and Affect: Mood normal.         Behavior: Behavior normal.         Thought Content: Thought content normal.         Judgment: Judgment normal.         Last Recorded Vitals  Blood pressure 117/56, pulse 62, temperature 36.5 °C (97.7 °F), temperature source Temporal, resp. rate 16, height 1.88 m (6' 2.02\"), weight 77.1 kg (170 lb), SpO2 95%.  Intake/Output last 3 Shifts:  I/O last 3 completed shifts:  In: 1120 (14.5 mL/kg) [P.O.:1120]  Out: 750 (9.7 mL/kg) [Urine:750 (0.3 mL/kg/hr)]  Weight: 77.1 kg     Relevant Results  Scheduled medications  bacitracin zinc-polymyxin B, , Topical, TID  baclofen, 5 mg, oral, TID  enoxaparin, 40 mg, subcutaneous, q24h  folic acid, 1 mg, oral, Daily  pantoprazole, 40 mg, oral, Daily before breakfast  polyethylene glycol, 17 g, oral, Daily  sennosides-docusate sodium, 2 tablet, oral, Nightly      Continuous " medications     PRN medications  PRN medications: diphenhydrAMINE, morphine, naloxone, ondansetron, oxyCODONE, prochlorperazine, pseudoephedrine    Results for orders placed or performed during the hospital encounter of 12/22/24 (from the past 24 hours)   CBC and Auto Differential   Result Value Ref Range    WBC 9.4 4.4 - 11.3 x10*3/uL    nRBC 0.5 (H) 0.0 - 0.0 /100 WBCs    RBC 3.40 (L) 4.50 - 5.90 x10*6/uL    Hemoglobin 9.4 (L) 13.5 - 17.5 g/dL    Hematocrit 28.3 (L) 41.0 - 52.0 %    MCV 83 80 - 100 fL    MCH 27.6 26.0 - 34.0 pg    MCHC 33.2 32.0 - 36.0 g/dL    RDW 21.9 (H) 11.5 - 14.5 %    Platelets 539 (H) 150 - 450 x10*3/uL    Neutrophils % 62.3 40.0 - 80.0 %    Immature Granulocytes %, Automated 0.7 0.0 - 0.9 %    Lymphocytes % 19.7 13.0 - 44.0 %    Monocytes % 12.2 2.0 - 10.0 %    Eosinophils % 4.4 0.0 - 6.0 %    Basophils % 0.7 0.0 - 2.0 %    Neutrophils Absolute 5.84 1.20 - 7.70 x10*3/uL    Immature Granulocytes Absolute, Automated 0.07 0.00 - 0.70 x10*3/uL    Lymphocytes Absolute 1.85 1.20 - 4.80 x10*3/uL    Monocytes Absolute 1.14 (H) 0.10 - 1.00 x10*3/uL    Eosinophils Absolute 0.41 0.00 - 0.70 x10*3/uL    Basophils Absolute 0.07 0.00 - 0.10 x10*3/uL   Comprehensive Metabolic Panel   Result Value Ref Range    Glucose 87 74 - 99 mg/dL    Sodium 139 136 - 145 mmol/L    Potassium 4.2 3.5 - 5.3 mmol/L    Chloride 106 98 - 107 mmol/L    Bicarbonate 26 21 - 32 mmol/L    Anion Gap 11 10 - 20 mmol/L    Urea Nitrogen 8 6 - 23 mg/dL    Creatinine 0.53 0.50 - 1.30 mg/dL    eGFR >90 >60 mL/min/1.73m*2    Calcium 9.5 8.6 - 10.6 mg/dL    Albumin 3.9 3.4 - 5.0 g/dL    Alkaline Phosphatase 227 (H) 33 - 120 U/L    Total Protein 6.7 6.4 - 8.2 g/dL    AST 66 (H) 9 - 39 U/L    Bilirubin, Total 4.5 (H) 0.0 - 1.2 mg/dL    ALT 53 (H) 10 - 52 U/L   Reticulocytes   Result Value Ref Range    Retic % 11.1 (H) 0.5 - 2.0 %    Retic Absolute 0.376 (H) 0.022 - 0.118 x10*6/uL    Reticulocyte Hemoglobin 28 28 - 38 pg    Immature Retic  fraction 27.7 (H) <=16.0 %   Lactate Dehydrogenase   Result Value Ref Range     (H) 84 - 246 U/L   Morphology   Result Value Ref Range    RBC Morphology See Below     Polychromasia Mild     Target Cells Few     Ovalocytes Few     Rosston Cells Few      *Note: Due to a large number of results and/or encounters for the requested time period, some results have not been displayed. A complete set of results can be found in Results Review.     US right upper quadrant    Result Date: 12/28/2024  Interpreted By:  Jamshid Enrique and Afshari Mirak Sohrab STUDY: US RIGHT UPPER QUADRANT;  12/27/2024 6:06 pm   INDICATION: Signs/Symptoms:sickle cell pt, increased abdominal pain.     COMPARISON: Ultrasound 09/18/2024.   ACCESSION NUMBER(S): [id number]   ORDERING CLINICIAN: DAE LOPEZ   TECHNIQUE: Multiple images of the right upper quadrant were obtained.  This examination was interpreted at [facility].   FINDINGS: LIVER: The liver measures 20.5 cm and is diffusely echogenic in appearance, consistent with diffuse fatty infiltration. The resulting increased beam attenuation thereby limiting evaluation of the liver for focal lesions. Within the limitations, no focal lesions are seen.     GALLBLADDER: There are stones/sludge in the gallbladder. The gallbladder is nondistended, and demonstrates no significant wall thickening or surrounding fluid. The gallbladder wall thickness is .14 cm. Sonographic Roman's sign is negative.     BILE DUCTS: No evidence of intra or extrahepatic biliary dilatation is identified; the common bile duct measures .44 cm.   PANCREAS: The pancreas is poorly visualized due to overlying bowel gas.   RIGHT KIDNEY: The right kidney measures 12.3 cm in length. The renal cortical echogenicity and thickness are within normal limit.  No hydronephrosis or renal calculi are seen.       Cholelithiasis without evidence of acute cholecystitis.   Hepatomegaly with hepatic  steatosis.   I personally reviewed the images/study and I agree with the findings as stated by resident physician Dr. José Miguel Flowers . This study was interpreted at [facility], [place], [place]..   MACRO: None   Signed by: Jamshid Enrique 12/28/2024 7:43 AM Dictation workstation:   [id number]    XR chest 1 view    Result Date: 12/25/2024  Interpreted By:  Reed Deng, STUDY: XR CHEST 1 VIEW;  12/25/2024 9:41 am   INDICATION: Signs/Symptoms:chest pain.   COMPARISON: Chest radiograph 12/22/2024 and chest CT 12/22/2024   ACCESSION NUMBER(S): [id number]   ORDERING CLINICIAN: ELLIOT CARDENAS   FINDINGS: AP radiograph of the chest.   CARDIOMEDIASTINAL SILHOUETTE: The cardiomediastinal silhouette is stable in size and configuration.   LUNGS: New bibasilar opacity with blunting of bilateral costophrenic angles. Low lung volumes with bronchovascular crowding and no pulmonary edema. There is no pneumothorax.   ABDOMEN: No remarkable upper abdominal findings.   BONES: No acute osseous abnormality.       1. New bibasilar opacity with blunting of bilateral costophrenic angles. While, the findings may represent combination atelectasis and small pleural effusions, possibility of superimposed acute infectious process is not excluded.   Signed by: Reed Deng 12/25/2024 11:46 AM Dictation workstation:   [id number]    IR CVC nontunneled    Result Date: 12/23/2024  Interpreted By:  Santy Shaw and Liller Gregory STUDY: IR CVC NONTUNNELED;  12/23/2024 9:01 am   INDICATION: Signs/Symptoms:apheresis line placement for urgent red cell exchange.   COMPARISON: CT angio chest 12/22/2024.   ACCESSION NUMBER(S): [id number]   ORDERING CLINICIAN: ELLIOT CARDENAS   TECHNIQUE: INTERVENTIONALIST(S): Santy Shaw and Jersey Hobson   CONSENT: The patient/patient's POA/next of kin was informed of the nature of the proposed procedure. The purposes, alternatives, risks, and benefits were  explained and discussed. All questions were answered and consent was obtained.   RADIATION EXPOSURE: Fluoroscopy time: 0.8 min. Dose: 3.19 mGy. Dose Area Product (DAP): 953   SEDATION: Moderate conscious IV sedation services (supervision of administration, induction, and maintenance) were provided by the physician performing the procedure with intravenous fentanyl 150 mcg and versed 3 mg for 20 minutes. The physician was assisted by an independent trained observer, an interventional radiology nurse, in the continuous monitoring of patient level of consciousness and physiologic status.   MEDICATION/CONTRAST: No additional   TIME OUT: A time out was performed immediately prior to procedure start with the interventional team, correctly identifying the patient name, date of birth, MRN, procedure, anatomy (including marking of site and side), patient position, procedure consent form, relevant laboratory and imaging test results, antibiotic administration, safety precautions, and procedure-specific equipment needs.   COMPLICATIONS: No immediate adverse events identified.   FINDINGS: The patient was positioned supine on the angiography table. The right  supraclavicular cutaneous tissues were prepared and draped in sterile manner.  1% lidocaine local anesthesia was instilled into the subcutaneous soft tissues at the selected access site for local anesthesia. Ultrasound images demonstrate a patent  right  internal jugular vein. Utilizing direct ultrasound guidance and micropuncture/Seldinger technique, the  right  internal jugular vein was accessed. An ultrasound digital spot image was acquired and stored on the  PACS. After confirmation of location, a 018 Sully-Mandrel guidewire was introduced and advanced into the inferior vena cava utilizing intermittent fluoroscopy.   The needle was removed with the guidewire left in place and exchanged for a 5-[place]izen of [place] coaxial dilator.  The inner dilator and wire were removed and a  J-tipped 035 guidewire was introduced through the access sheath.  The skin tract was dilated with successive increase in size of vascular dilators under direct fluoroscopic visualization.   Subsequently, a temporary 12 Romanian x 16 cm catheter was advanced with its tip overlying the cavoatrial junction under direct fluoroscopic guidance.  The catheter ports were aspirated and flushed with normal saline and charged with heparin.  The external portions of the catheter were secured in place with sterile suture dressings.   The patient tolerated the procedure without complication.       1.  Technically uneventful placement of a   right  internal jugular temporary dialysis catheter under direct ultrasound and fluoroscopic guidance - optimal catheter tip position at the right atrial superior vena caval junction and the catheter is ready for utilization.   I was present for and/or performed the critical portions of the procedure and immediately available throughout the entire procedure.   I personally reviewed the image(s) / study and resident interpretation. I agree with the findings as stated.   Dictated at [facility].   MACRO: None   Signed by: Santy Shaw 12/23/2024 9:52 AM Dictation workstation:   [id number]    ECG 12 lead    Result Date: 12/22/2024  Normal sinus rhythm Nonspecific ST and T wave abnormality Abnormal ECG When compared with ECG of 29-SEP-2024 22:20, Nonspecific T wave abnormality now evident in Inferior leads Nonspecific T wave abnormality now evident in Lateral leads See ED provider note for full interpretation and clinical correlation Confirmed by Dipti Murrieta ([id number]) on 12/22/2024 10:01:14 PM    CT angio chest for pulmonary embolism    Result Date: 12/22/2024  Interpreted By:  Ed Wren and Nakamoto Kent STUDY: CT ANGIO CHEST FOR PULMONARY EMBOLISM;  12/22/2024 6:50 am   INDICATION: Signs/Symptoms:tachycardic, hypoxic, chest pain, hx  sickle cell.     COMPARISON: CTA chest 08/23/2024   ACCESSION NUMBER(S): [id number]   ORDERING CLINICIAN: RICCO HOLLIS   TECHNIQUE: Helical data acquisition of the chest was obtained after intravenous administration of 100 ML Omnipaque 350, as per PE protocol. Images were reformatted in coronal and sagittal planes. Axial and coronal maximum intensity projection (MIP) images were created and reviewed.   FINDINGS: POTENTIAL LIMITATIONS OF THE STUDY: The assessment is limited by respiratory motion and suboptimal contrast opacification of pulmonary arteries.   HEART AND VESSELS: No discrete filling defects within the main pulmonary artery or its branches to segmental level. Please note that, assessment of subsegmental branches is limited and small peripheral emboli are not entirely excluded. Main pulmonary artery and its branches are normal in caliber.   The thoracic aorta normal in course and caliber.There is no atherosclerosis present, including calcified and noncalcified plaques.Although, the study is not tailored for evaluation of aorta, there is no definite evidence of acute aortic pathology. No coronary artery calcifications are seen. Please note, the study is not optimized for evaluation of coronary arteries.   The cardiac chambers are not enlarged. There is no pericardial effusion seen.   MEDIASTINUM AND ANA, LOWER NECK AND AXILLA: The visualized thyroid gland is within normal limits. No evidence of thoracic lymphadenopathy by CT criteria. Prominent thymic tissue is noted which is consistent with patient age. Esophagus appears within normal limits as seen.   LUNGS AND AIRWAYS: The trachea and central airways are patent. No endobronchial lesion is seen.   The bilateral lungs are without evidence of pneumothorax. Bibasilar dependent ground-glass opacities which are new from prior exam. There is also a focal nodular opacity within the posterior portion of the right middle lobe which is new.   UPPER ABDOMEN:  Hyperdense noted within the posterior dependent portions of the gallbladder without gallbladder wall thickening. Atrophic appearing spleen. Stomach appears markedly distended with contents. No pericholecystic fluid.   CHEST WALL AND OSSEOUS STRUCTURES: Chest wall is within normal limits. No acute osseous pathology.There are no suspicious osseous lesions.       1. No evidence of acute pulmonary embolism to segmental level. Please note that, assessment of subsegmental branches is limited and small peripheral emboli are not entirely excluded. 2. Bibasilar dependent ground-glass opacities that may represent atelectasis, however acute chest syndrome is in the differential in the appropriate clinical setting. In addition, there is a new focal nodular opacity in the posterior right middle lobe which is suspicious for infectious/inflammatory etiology. 3. Cholelithiasis. 4. Additional findings as described above.   I personally reviewed the images/study and I agree with the findings as stated by Gil Bonilla MD. This study was interpreted at [facility], [place], [place].   MACRO: None   Signed by: Ed Wren 12/22/2024 8:34 AM Dictation workstation:   [id number]    XR chest 2 views    Result Date: 12/22/2024  Interpreted By:  Maykel Harmon, STUDY: XR CHEST 2 VIEWS;  12/22/2024 4:23 am   INDICATION: Signs/Symptoms:c/f acute chest.   COMPARISON: Chest x-ray 09/25/2024   ACCESSION NUMBER(S): [id number]   ORDERING CLINICIAN: AMBROSIO VARGAS   FINDINGS:     CARDIOMEDIASTINAL SILHOUETTE: Cardiomediastinal silhouette is stable in size and configuration.   LUNGS: No consolidation, pleural effusion or pneumothorax.   ABDOMEN: No remarkable upper abdominal findings.   BONES: No acute osseous abnormality.       No acute cardiopulmonary process.   MACRO: None   Signed by: Maykel Harmon 12/22/2024 4:56 AM Dictation workstation:   [id number]         Assessment/Plan   Assessment & Plan  Sickle  cell pain crisis (Multi)    The [facility] Integrative Medicine Symptom Management program offers multi-disciplinary supervised care of cancer patients using Integrative Modalities billed to insurance using NCCN and SIO/ASCO guideline-driven practices.  ESAS is obtained prior to and after each treatment by the practitioner      Sickle cell disease with acute on chronic pain:   pain related to vaso-occlusive crisis  Defer to primary team for adequate PO/IV pain regimen   Recommend integrative therapy modalities as pt allows:  -Acupuncture; provided pt education today. Pt declined, stating he is not interested in integrative heme/onc interventions  -Acupressure, gua sha   -Gentle bodywork and stretching as tolerated         -Art therapy - consult placed   -Music therapy - consult placed   -Chaplaincy - consult placed        Nausea/Vomiting:  Intermittent nausea and vomiting related to opioids, pain  -Defer to supportive oncology recs for pharmacological management  -Recommend integrative medicine modalities as listed above     Integrative medicine will sign off at this time, given pt's decline in participation of integrative medicine interentions.  Please don't hesitate to page us if any further questions arise.  Thank you for allowing us to participate in the care of this patient.          Brooklyn Last, APRN-CNP (available by [employer])  [facility]  Inpatient Integrative Medicine      I spent 45 minutes in the care of this patient which included chart review, interviewing patient/family, discussion with primary team, coordination of care, and documentation.     Medical Decision Making was high level due to high complexity of problems, extensive data review, and high risk of management/treatment.

## 2024-12-30 NOTE — PROGRESS NOTES
"Joellen Narayan is a 24 y.o. male on day 8 of admission presenting with Sickle cell pain crisis (Multi).    Subjective   Patient seen resting in bed. Reports 7/10 pain in his flank areas, improved from yesterday. Chest pain is also resolved this morning. We discussed plan to rotate IVP morphine 6mg with home dose of oxycodone today, patient is agreeable to plan. He otherwise denies shortness of breath, chest pain, abdominal pain, N/V/D, F/C, H/a.        Objective     Physical Exam  Constitutional:       Appearance: Normal appearance.   HENT:      Head: Normocephalic.      Mouth/Throat:      Mouth: Mucous membranes are moist.   Eyes:      Pupils: Pupils are equal, round, and reactive to light.   Cardiovascular:      Rate and Rhythm: Normal rate and regular rhythm.   Pulmonary:      Effort: Pulmonary effort is normal.      Breath sounds: Normal breath sounds.   Abdominal:      General: Abdomen is flat.   Musculoskeletal:         General: Normal range of motion.   Skin:     General: Skin is warm.   Neurological:      General: No focal deficit present.      Mental Status: He is alert.   Psychiatric:         Mood and Affect: Mood normal.         Last Recorded Vitals  Blood pressure 112/64, pulse 70, temperature 36.7 °C (98.1 °F), temperature source Temporal, resp. rate 16, height 1.88 m (6' 2.02\"), weight 77.1 kg (170 lb), SpO2 93%.  Intake/Output last 3 Shifts:  I/O last 3 completed shifts:  In: 1120 (14.5 mL/kg) [P.O.:1120]  Out: 750 (9.7 mL/kg) [Urine:750 (0.3 mL/kg/hr)]  Weight: 77.1 kg     Relevant Results                 Scheduled medications  bacitracin zinc-polymyxin B, , Topical, TID  baclofen, 5 mg, oral, TID  enoxaparin, 40 mg, subcutaneous, q24h  folic acid, 1 mg, oral, Daily  pantoprazole, 40 mg, oral, Daily before breakfast  polyethylene glycol, 17 g, oral, Daily  sennosides-docusate sodium, 2 tablet, oral, Nightly      Continuous medications     PRN medications  PRN medications: diphenhydrAMINE, morphine, " naloxone, ondansetron, prochlorperazine, pseudoephedrine               Assessment/Plan   Assessment & Plan  Sickle cell pain crisis (Multi)    Joellen Narayan is a 23 y.o. 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 CTPE (12/22) without evidence of PE, but showing bilat GGO with new focal nodular opacity in posterior R middle lobe. S/p IV astrid and vanc in ED, s/p IV zosyn q6h (12/22- 12/25). Pt on new O2 requirements with severe chest pain, c/f ACS. Heme consulted (12/22), s/p RBC exchange (12/23). Transitioned to levofloxacin (12/25-12/29). Abdominal pain and increased Tbili, given history of choledocholithiasis, RUQ US ordered on 12/27 which showed cholethiasis without cholecystitis, no evidence of biliary dilation, reschedule appointment of cholecystectomy procedure requested. DC pending pain improvement.     12/30 updates:  - start rotating with IVP morphine 6mg q4 hours rotating q2 hours with home oxycodone 20mg q4 hours      #ACS s/p exchange   - pt presented to the ED (12/22) with chest pain   - pt hypoxic on admit and tachycardic, placed on 2L supp O2 -- bumped up to 3L 12/22  - CXR (12/22) neg for acute process  - CTPE (12/22) neg for PE, showing bilat GGO with new focal nodular opacity in posterior R middle lobe  - blood cultures x 2 (12/22) NGTD  - UA +1 leuks, neg nitrites (12/22)  - urine culture negative (12/22), urine culture neg   - covid/ flu A/B negative (12/22)  - Strep pneumo, legionella neg (12/22)  - MRSA neg (12/22), Lipase 37 (12/22), troponin 7 (12/22)  -  ACS is defined as radiographic evidence of consolidation plus fever > 38.5, New oxygen req, severe chest pain, Respiratory distress or new wheezing.  - Pt meets criteria for acute chest with new O2 requirements (3L), severe chest pain, and radiographic evidence of consolidation  - Heme consulted on admit (12/22) rec RBC exchange   - transfusion med consulted (12/22), s/p RBC exchange 12/23  - IR order placed for 12/23 line placement, removed today 12/24  - s/p IV astrid and IV vanc in ED  - (12/22- 12/25) s/p IV zosyn 3.375 q6h for CAP coverage per attending   - s/p levofloxacin 750mg q24h (12/25-12/29)  - repeat hgb S (12/23) 30.9%  - encourage IS  - 12/24 pt had syncopal episode- found in the bathroom sitting on the floor, pt states he did not hit his head. Vitals normal, BS normal, brought back to bed and he was in good spirits, s/p 500 ml bolus - will continue to monitor  - Orthos neg (12/24)  - Repeat EKG and troponin neg (12/25) for continued chest pain  - CXR 12/25 shows new bibasilar opacity with blunting of bilateral costophrenic angle      # Hgb SS with severe pain   - OARRS reviewed, no aberrancies  - No current care path  - Hgb baseline ~8.5; currently stable, no indication for simple transfusion (12/22)  - Tbili baseline fluctuates ~5-1; Tbili 9.0 on admission (12/22) --> 12/24 tbili 9.4 --> 12/27 tbili pending  - LDH baseline ~500   on admission (12/22) --> 12/24  --> 12/27 LDH pending  - s/p 1L IV NS in ED (12/22)  - s/p IV dilaudid 3mg q2h PRN for pain (12/22)  - s/p IV dilaudid 4mg q2h PRN for severe pain (12/22-12/24)   - 12/24 overnight pt placed on dilaudid PCA with no relief, states that IV morphine helps with his pain. (12/24) s/p IVP morphine 6mg q2h PRN for severe pain, s/p increased to 8mg IVP morphine q2h PRN (12/24-12/25)  - 12/25 pt still with uncontrolled pain, increased to 10mg IVP morphine q2h PRN (12/25-12/26)  - s/p IVP morphine to 12mg IVP q2h PRN (12/26-12/27)   - s/p   IVP morphine 10mg q2 hours (12/27-12/28)  - s/p IVP morphine 6mg q2 hours (12/28-12/30)   - 12/30 start rotating with IVP morphine 6mg q4 hours rotating q2 hours with home oxycodone 20mg q4 hours   - s/p IV toradol 15mg q6h x 3 days with PPI prophy (12/22-12/24)  - Continue folic acid 1mg daily  - Hgb S (12/22) 85.1, post exchange Hgb S 30.9% (12/23)  - utox + opiates and cannabinoid (12/22)  - PO Zofran PRN for opioid-induced nausea, PO Benadryl PRN for opioid-induced pruritus, Bowel regimen for opioid-induced constipation with DocuSenna 2tabs BID and Miralax daily  - 12/25 s/p 500 ml IV LR for decreased PO intake due to pain     # hx of Priapism  - most recent admission c/b worsening priapism needing urgent [facility] (9/19)  - c/w pseudophed 60mg q8h PRN for priapism      # Recent Choledocholithiasis  - s/p ERCP 7/18/24 with a biliary sphincterotomy where sludge and stones were removed, achieving complete clearance  - Seen by gen surg 8/8/24 Dr. Dove who rec lap cholecystectomy d/t the chance of recurrence of choledocholithiasis  - Surgery has yet to be scheduled, surgery recently cancelled on 10/29  - Tbili 9.0 on admit (12/22), started to downtrend, now uptrending as of 12/27, Tbili 9.3 (12/27)   - RUQ US completed on 12/27 for increased alk phos and Tbili showed cholelithiasis without cholecystitis   - GGT - 74 (12/22)   - new general surgery appointment for reschedule of cholecystectomy appt (prev scheduled 10/2024) requested and pending      # Nodular lymphocyte predominant Hodgkins lymphoma (NLPHL)   - Primary Oncologist: Dr. Stoll  - Enlarged lymph nodes noted 4/1/22  - RUQ US (11/14/22) with mildly enlarged LNs in the region of the kavin hepatis  - MRI liver (11/16/22) showed re-demonstration of bulky retroperitoneal lymphadenopathy and kavin hepatic lymphadenopathy    - (11/18/22) lymph node biopsy showed atypical lymphoid infiltrate. Reviewed by Hemepath board, insufficient for lymphoma diagnosis  -  "PET/CT (12/6/22) showing retroperitoneal lymphadenopathy  - Followed up with Dr. Stoll (12/16/22) with plan for surg/onc consult for large tissue bx but patient missed apt and was lost to follow up  - Requested new apt with Dr. Ronnie Marte 6/19/23, patient was not seen and lost to follow up  - CT a/p (11/28/23) increased size of retroperitoneal lymph nodes reflecting extramedullary hematopoiesis I/s/o sickle cell vs lymphoma  - Paraaortic LN bx via IR (11/30/23) consistent with NLPHL. Flow: no clonal B cell or T cell population identified, lymphocyte 95%, CD3+CD4+ 68%, CD3+CD8+ 7%, CD19+ 24%  - Elevated LDH/bili partially from sickle cell disease   - Chemotherapy (R-CHOP) was discussed with primary oncologist Dr. Stoll, and decision was made to simplify his chemotherapy to Rituxan and prednisone q3 weeks mainly due to frequent sickle cell crisis  - Current chemo regimen: Rituxan and prednisone q3 weeks (C1 1/18/24, C2 2/8/24, Missed C3 d/t sickle cell pain crisis, C4 4/4/24, C5 5/16/24, C6 6/7/24)  - Per pt no longer taking Acyclovir 400mg BID prophy   - PET CT (7/25) overall showing great response to treatment with persistent residual viable disease involving a left common iliac node  - PET/CT (11/18) showing Interval increased metabolic activity within upper abdominal and  bilateral inguinal lymph nodes compatible with interval worsening of lymphomatous disease  - Last FUV with Dr. Stoll 11/21, per note no current treatment and will continue with observation at this time, plan for repeat CT/PET in 3 months     # Hx of nocturnal oxygen dependence and hypoxia on room air  - Pt currently has home 2L supp O2   - Wean as able, encourage incentive spirometry  - Pulm FUV 8/8- \"Given his history of sickle cell disease, it is important to ensure he has formal sleep testing to look at desaturations and presence of sleep apnea despite not having a convincing history/body habitus typical for suspecting sleep apnea- " "patients with sickle cell have a higher prevalence of sleep disorders including nocturnal hypoxemia\"--> rec sleep referral for formal testing if deemed appropriate, ABG and O2 destat testing, and TTE with bubble study  - TTE 8/23 showing EF 60-65%, severely dilated LV and LA, + intrapulmonary shunting    - pt currently on 3L supp O2, wean as able  - encourage IS     DVT prophy: Lovenox subcutaneous, SCDs, encourage ambulation     DISPO:  - Full code, confirmed on admit  - Discharge home resumed home O2 pending improvement in pain, poss 12/31  - SUSIE Narayan (Parent) [phone]  - sickle cell FUV 1/8, gen surg requested and pending as of 12/30, Pulm on 2/13, PET on 3/4, onc with Dr. Stoll on 3/13       I spent 60 minutes in the professional and overall care of this patient.    Assessment and plan as above discussed with attending physician, Dr. Soheila Stoll, PAKenzieC      "

## 2024-12-30 NOTE — CARE PLAN
Problem: Pain - Adult  Goal: Verbalizes/displays adequate comfort level or baseline comfort level  Outcome: Progressing     Problem: Safety - Adult  Goal: Free from fall injury  Outcome: Progressing     Problem: Discharge Planning  Goal: Discharge to home or other facility with appropriate resources  Outcome: Progressing     Problem: Pain  Goal: Takes deep breaths with improved pain control throughout the shift  Outcome: Progressing  Goal: Turns in bed with improved pain control throughout the shift  Outcome: Progressing  Goal: Walks with improved pain control throughout the shift  Outcome: Progressing  Goal: Performs ADL's with improved pain control throughout shift  Outcome: Progressing     Problem: Fall/Injury  Goal: Not fall by end of shift  Outcome: Progressing  Goal: Be free from injury by end of the shift  Outcome: Progressing  Goal: Verbalize understanding of personal risk factors for fall in the hospital  Outcome: Progressing  Goal: Verbalize understanding of risk factor reduction measures to prevent injury from fall in the home  Outcome: Progressing  Goal: Use assistive devices by end of the shift  Outcome: Progressing  Goal: Pace activities to prevent fatigue by end of the shift  Outcome: Progressing   The patient's goals for the shift include      The clinical goals for the shift include Pt will remain safe in room and use call light during shift on 12/30/24 @ 1930    Pt was VSS. Pt reported pain from 5-7/10 with oral pain medications. Pt remained safe on floor and was able to independently ambulate.

## 2024-12-31 ENCOUNTER — PHARMACY VISIT (OUTPATIENT)
Dept: PHARMACY | Facility: CLINIC | Age: 24
End: 2024-12-31
Payer: MEDICARE

## 2024-12-31 VITALS
TEMPERATURE: 98.1 F | HEART RATE: 71 BPM | SYSTOLIC BLOOD PRESSURE: 104 MMHG | RESPIRATION RATE: 16 BRPM | WEIGHT: 170 LBS | BODY MASS INDEX: 21.82 KG/M2 | HEIGHT: 74 IN | DIASTOLIC BLOOD PRESSURE: 61 MMHG | OXYGEN SATURATION: 97 %

## 2024-12-31 PROBLEM — D57.00 SICKLE CELL PAIN CRISIS (MULTI): Status: RESOLVED | Noted: 2024-12-22 | Resolved: 2024-12-31

## 2024-12-31 LAB
ALBUMIN SERPL BCP-MCNC: 3.9 G/DL (ref 3.4–5)
ALP SERPL-CCNC: 198 U/L (ref 33–120)
ALT SERPL W P-5'-P-CCNC: 40 U/L (ref 10–52)
ANION GAP SERPL CALC-SCNC: 13 MMOL/L (ref 10–20)
AST SERPL W P-5'-P-CCNC: 45 U/L (ref 9–39)
BASOPHILS # BLD AUTO: 0.06 X10*3/UL (ref 0–0.1)
BASOPHILS NFR BLD AUTO: 0.7 %
BILIRUB SERPL-MCNC: 4 MG/DL (ref 0–1.2)
BUN SERPL-MCNC: 6 MG/DL (ref 6–23)
BURR CELLS BLD QL SMEAR: NORMAL
CALCIUM SERPL-MCNC: 9.3 MG/DL (ref 8.6–10.6)
CHLORIDE SERPL-SCNC: 106 MMOL/L (ref 98–107)
CO2 SERPL-SCNC: 24 MMOL/L (ref 21–32)
CREAT SERPL-MCNC: 0.52 MG/DL (ref 0.5–1.3)
EGFRCR SERPLBLD CKD-EPI 2021: >90 ML/MIN/1.73M*2
EOSINOPHIL # BLD AUTO: 0.39 X10*3/UL (ref 0–0.7)
EOSINOPHIL NFR BLD AUTO: 4.7 %
ERYTHROCYTE [DISTWIDTH] IN BLOOD BY AUTOMATED COUNT: 21.6 % (ref 11.5–14.5)
GLUCOSE SERPL-MCNC: 82 MG/DL (ref 74–99)
HCT VFR BLD AUTO: 28.9 % (ref 41–52)
HGB BLD-MCNC: 9.8 G/DL (ref 13.5–17.5)
HGB RETIC QN: 30 PG (ref 28–38)
HOWELL-JOLLY BOD BLD QL SMEAR: PRESENT
IMM GRANULOCYTES # BLD AUTO: 0.06 X10*3/UL (ref 0–0.7)
IMM GRANULOCYTES NFR BLD AUTO: 0.7 % (ref 0–0.9)
IMMATURE RETIC FRACTION: 21.1 %
LDH SERPL L TO P-CCNC: 343 U/L (ref 84–246)
LYMPHOCYTES # BLD AUTO: 1.98 X10*3/UL (ref 1.2–4.8)
LYMPHOCYTES NFR BLD AUTO: 23.9 %
MCH RBC QN AUTO: 28.2 PG (ref 26–34)
MCHC RBC AUTO-ENTMCNC: 33.9 G/DL (ref 32–36)
MCV RBC AUTO: 83 FL (ref 80–100)
MONOCYTES # BLD AUTO: 1.02 X10*3/UL (ref 0.1–1)
MONOCYTES NFR BLD AUTO: 12.3 %
NEUTROPHILS # BLD AUTO: 4.78 X10*3/UL (ref 1.2–7.7)
NEUTROPHILS NFR BLD AUTO: 57.7 %
NRBC BLD-RTO: 1.1 /100 WBCS (ref 0–0)
OVALOCYTES BLD QL SMEAR: NORMAL
PAPPENHEIMER BOD BLD QL SMEAR: PRESENT
PLATELET # BLD AUTO: 626 X10*3/UL (ref 150–450)
POLYCHROMASIA BLD QL SMEAR: NORMAL
POTASSIUM SERPL-SCNC: 3.9 MMOL/L (ref 3.5–5.3)
PROT SERPL-MCNC: 6.6 G/DL (ref 6.4–8.2)
RBC # BLD AUTO: 3.47 X10*6/UL (ref 4.5–5.9)
RBC MORPH BLD: NORMAL
RETICS #: 0.37 X10*6/UL (ref 0.02–0.12)
RETICS/RBC NFR AUTO: 10.7 % (ref 0.5–2)
SICKLE CELLS BLD QL SMEAR: NORMAL
SODIUM SERPL-SCNC: 139 MMOL/L (ref 136–145)
TARGETS BLD QL SMEAR: NORMAL
WBC # BLD AUTO: 8.3 X10*3/UL (ref 4.4–11.3)

## 2024-12-31 PROCEDURE — 84075 ASSAY ALKALINE PHOSPHATASE: CPT

## 2024-12-31 PROCEDURE — RXMED WILLOW AMBULATORY MEDICATION CHARGE

## 2024-12-31 PROCEDURE — 85025 COMPLETE CBC W/AUTO DIFF WBC: CPT

## 2024-12-31 PROCEDURE — 99239 HOSP IP/OBS DSCHRG MGMT >30: CPT

## 2024-12-31 PROCEDURE — 36415 COLL VENOUS BLD VENIPUNCTURE: CPT

## 2024-12-31 PROCEDURE — 83615 LACTATE (LD) (LDH) ENZYME: CPT

## 2024-12-31 PROCEDURE — 2500000001 HC RX 250 WO HCPCS SELF ADMINISTERED DRUGS (ALT 637 FOR MEDICARE OP)

## 2024-12-31 PROCEDURE — 85045 AUTOMATED RETICULOCYTE COUNT: CPT

## 2024-12-31 RX ORDER — FOLIC ACID 1 MG/1
1 TABLET ORAL DAILY
Qty: 30 TABLET | Refills: 0 | Status: SHIPPED | OUTPATIENT
Start: 2024-12-31 | End: 2025-01-30

## 2024-12-31 RX ORDER — OXYCODONE HYDROCHLORIDE 20 MG/1
20 TABLET ORAL EVERY 6 HOURS PRN
Qty: 20 TABLET | Refills: 0 | Status: SHIPPED | OUTPATIENT
Start: 2024-12-31 | End: 2024-12-31

## 2024-12-31 RX ORDER — OXYCODONE HYDROCHLORIDE 20 MG/1
20 TABLET ORAL EVERY 8 HOURS PRN
Qty: 20 TABLET | Refills: 0 | Status: SHIPPED | OUTPATIENT
Start: 2024-12-31 | End: 2025-01-07

## 2024-12-31 RX ADMIN — PANTOPRAZOLE SODIUM 40 MG: 40 TABLET, DELAYED RELEASE ORAL at 10:12

## 2024-12-31 RX ADMIN — FOLIC ACID 1 MG: 1 TABLET ORAL at 10:12

## 2024-12-31 RX ADMIN — BACLOFEN 5 MG: 10 TABLET ORAL at 10:12

## 2024-12-31 ASSESSMENT — PAIN - FUNCTIONAL ASSESSMENT
PAIN_FUNCTIONAL_ASSESSMENT: 0-10
PAIN_FUNCTIONAL_ASSESSMENT: 0-10

## 2024-12-31 ASSESSMENT — COGNITIVE AND FUNCTIONAL STATUS - GENERAL
DAILY ACTIVITIY SCORE: 24
MOBILITY SCORE: 24

## 2024-12-31 ASSESSMENT — PAIN SCALES - GENERAL
PAINLEVEL_OUTOF10: 4
PAINLEVEL_OUTOF10: 4

## 2024-12-31 NOTE — CARE PLAN
The patient's goals for the shift include      The clinical goals for the shift include pt will remain HDS and VSS through end of shift 12/31/24 @ 0700      Problem: Pain - Adult  Goal: Verbalizes/displays adequate comfort level or baseline comfort level  Outcome: Progressing     Problem: Safety - Adult  Goal: Free from fall injury  Outcome: Progressing     Problem: Discharge Planning  Goal: Discharge to home or other facility with appropriate resources  Outcome: Progressing     Problem: Pain  Goal: Takes deep breaths with improved pain control throughout the shift  Outcome: Progressing  Goal: Turns in bed with improved pain control throughout the shift  Outcome: Progressing

## 2024-12-31 NOTE — DISCHARGE SUMMARY
Discharge Diagnosis  Sickle cell pain crisis (Multi)    Issues Requiring Follow-Up  Sickle cell disease    Test Results Pending At Discharge  Pending Labs       Order Current Status    CBC and Auto Differential In process    Reticulocytes In process            Hospital Course  Joellen Narayan is a 23 y.o. 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 CTPE (12/22) without evidence of PE, but showing bilat GGO with new focal nodular opacity in posterior R middle lobe. S/p IV astrid and vanc in ED, s/p IV zosyn q6h (12/22- 12/25). Pt on new O2 requirements with severe chest pain, c/f ACS. Heme consulted (12/22), s/p RBC exchange (12/23). Transitioned to levofloxacin (12/25-12/29). Abdominal pain and increased Tbili, given history of choledocholithiasis, RUQ US ordered on 12/27 which showed cholethiasis without cholecystitis, no evidence of biliary dilation, reschedule appointment of cholecystectomy procedure requested. 12/31 discharged home since pain improved and vs/s. Patient to follow up with outpatient sickle cell team 1/9/25 and surgery 1/9/25. Rx sent for oxy 20mg (20 tabs) and folic acid (30 tabs)  On the day of discharge, the patient reported feeling well and pain was controlled. Vitals and labs were stable. On exam:  Constitutional: Awake, NAD  ENMT: mucous membranes moist, no apparent injury, no lesions seen  Head/Neck: NCAT  Respiratory/Thorax: CTAB, no wheezing  Cardiovascular: RRR, S1+S2, no murmur  Gastrointestinal: Soft, NT, nondistended, +BS  Extremities: No LE  edema  Psychological: Appropriate mood and behavior  Skin: no rash noted  Attending has reviewed all labs and vitals, and discussed and agreed with the discharge plan prior to patient discharge.  Patient discharged in stable condition. > 30 minutes spent on discharge planning       Pertinent Physical Exam At Time of Discharge  Physical Exam  HENT:      Head: Normocephalic.      Nose: Nose normal.      Mouth/Throat:      Mouth: Mucous membranes are moist.   Eyes:      Pupils: Pupils are equal, round, and reactive to light.   Cardiovascular:      Rate and Rhythm: Normal rate.   Pulmonary:      Effort: Pulmonary effort is normal.   Abdominal:      Palpations: Abdomen is soft.   Musculoskeletal:         General: Normal range of motion.      Cervical back: Normal range of motion.   Skin:     General: Skin is warm.      Capillary Refill: Capillary refill takes less than 2 seconds.   Neurological:      General: No focal deficit present.      Mental Status: He is alert.   Psychiatric:         Mood and Affect: Mood normal.         Home Medications     Medication List      CHANGE how you take these medications     folic acid 1 mg tablet; Commonly known as: Folvite; Take 1 tablet (1 mg)   by mouth once daily.; What changed: how much to take     CONTINUE taking these medications     baclofen 5 mg tablet; Commonly known as: Lioresal; Take 1 tablet (5 mg)   by mouth 3 times a day.   oxyCODONE 20 mg immediate release tablet; Commonly known as: Roxicodone;   Take 1 tablet (20 mg) by mouth every 6 hours if needed for severe pain (7   - 10).   pseudoephedrine 60 mg tablet; Commonly known as: Sudogest; Take 1 tablet   (60 mg) by mouth every 8 hours if needed for congestion for up to 10 days.     STOP taking these medications     flutamide 125 mg capsule; Commonly known as: Eulexin   gabapentin 100 mg capsule; Commonly known as: Neurontin     ASK your doctor about these medications     levoFLOXacin 750 mg tablet; Commonly known as:  Levaquin; Take 1 tablet   (750 mg) by mouth once every 24 hours for 3 days. Do not fill before   December 26, 2024.; Ask about: Should I take this medication?   pantoprazole 20 mg EC tablet; Commonly known as: ProtoNix; Take 1 tablet   (20 mg) by mouth once daily in the morning. Take before meals. Do not   crush, chew, or split.       Outpatient Follow-Up  Future Appointments   Date Time Provider Department Center   1/8/2025  9:30 AM Homa Dickinson APRN-CNP [id number] [facility]   1/9/2025 11:30 AM Elias Dove MD [id number] Academic   2/13/2025 10:30 AM Radha Garcia MD [id number] Academic   3/4/2025  9:00 AM [facility] ADMIN ROOM PET CT 1 [facility]   3/4/2025 10:00 AM [facility] PET CT 2 [facility]   3/13/2025  4:00 PM Melissa Stoll MD [id number] Academic       Marie Modi, APRN-CNP

## 2024-12-31 NOTE — CARE PLAN
Problem: Pain - Adult  Goal: Verbalizes/displays adequate comfort level or baseline comfort level  Outcome: Adequate for Discharge     Problem: Safety - Adult  Goal: Free from fall injury  Outcome: Adequate for Discharge     Problem: Discharge Planning  Goal: Discharge to home or other facility with appropriate resources  Outcome: Adequate for Discharge     Problem: Pain  Goal: Takes deep breaths with improved pain control throughout the shift  Outcome: Adequate for Discharge  Goal: Turns in bed with improved pain control throughout the shift  Outcome: Adequate for Discharge  Goal: Walks with improved pain control throughout the shift  Outcome: Adequate for Discharge  Goal: Performs ADL's with improved pain control throughout shift  Outcome: Adequate for Discharge     Problem: Fall/Injury  Goal: Not fall by end of shift  Outcome: Adequate for Discharge  Goal: Be free from injury by end of the shift  Outcome: Adequate for Discharge  Goal: Verbalize understanding of personal risk factors for fall in the hospital  Outcome: Adequate for Discharge  Goal: Verbalize understanding of risk factor reduction measures to prevent injury from fall in the home  Outcome: Adequate for Discharge  Goal: Use assistive devices by end of the shift  Outcome: Adequate for Discharge  Goal: Pace activities to prevent fatigue by end of the shift  Outcome: Adequate for Discharge   The patient's goals for the shift include      The clinical goals for the shift include Pt will remain safe in room and use call light during shift on 12/31/24 @ 1930   Pt was discharged to home. Pt reviewed discharge paperwork including medications and follow up appointments. Pt verbalized understanding and denied questions at the time. Pt IV was removed prior to discharge. Pt received meds to beds and took them home.   Discharge complete.   Coretta Auguste RN

## 2025-01-01 LAB
ATRIAL RATE: 73 BPM
P AXIS: 26 DEGREES
P OFFSET: 177 MS
P ONSET: 126 MS
PR INTERVAL: 184 MS
Q ONSET: 218 MS
QRS COUNT: 12 BEATS
QRS DURATION: 100 MS
QT INTERVAL: 368 MS
QTC CALCULATION(BAZETT): 405 MS
QTC FREDERICIA: 393 MS
R AXIS: 10 DEGREES
T AXIS: 3 DEGREES
T OFFSET: 402 MS
VENTRICULAR RATE: 73 BPM

## 2025-01-03 ENCOUNTER — HOSPITAL ENCOUNTER (OUTPATIENT)
Facility: HOSPITAL | Age: 25
Setting detail: OUTPATIENT SURGERY
End: 2025-01-03
Attending: SURGERY | Admitting: SURGERY
Payer: COMMERCIAL

## 2025-01-03 DIAGNOSIS — K80.50 CHOLEDOCHOLITHIASIS: ICD-10-CM

## 2025-01-04 ASSESSMENT — ENCOUNTER SYMPTOMS
MYALGIAS: 0
HEMATURIA: 0
BACK PAIN: 0
DIZZINESS: 0
WHEEZING: 0
LIGHT-HEADEDNESS: 0
CONSTIPATION: 0
HEADACHES: 0
DYSURIA: 0
FATIGUE: 1
ARTHRALGIAS: 0
SHORTNESS OF BREATH: 0
SCLERAL ICTERUS: 1
COUGH: 0
VOMITING: 0
EXTREMITY WEAKNESS: 0
SORE THROAT: 0
ABDOMINAL PAIN: 0
BLOOD IN STOOL: 0
DIARRHEA: 0
DIAPHORESIS: 0
DEPRESSION: 0
NAUSEA: 0
LEG SWELLING: 0
HEMOPTYSIS: 0
CHEST TIGHTNESS: 0
NERVOUS/ANXIOUS: 0
EYE PROBLEMS: 0
FEVER: 0
FREQUENCY: 0
PALPITATIONS: 0
WOUND: 0
BRUISES/BLEEDS EASILY: 0

## 2025-01-04 NOTE — PROGRESS NOTES
SICKLE CELL OUTPATIENT NOTE  Patient ID: Joellen Narayan   Visit Type: Follow up visit     ASSESSMENT AND PLAN  Joellen Narayan is 24 y.o. 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 He is in steady state and without acute complications of sickle cell disease. Previously on L glutamine but non compliant with this. 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 He has had good clinical response with decreased LN    Chronic constipation   Nocturnal hypoxia, but non compliant with supplemental oxygen     PLAN  - Once again discussed chronic PRBC transfusions preferably manual exchange transfusions with Joellen and his mother. They are still thinking about this and will keep me posted on what they decide   - No changes in current pain regimen with   Oral Tylenol 650 mg every 6 hours and or ibuprofen 600 mg every 8 hours as needed for mild to moderate pain  Oral oxycodone 15 mg every 6 hours as needed   - Discussed non pharmacologic methods of pain control   - Reviewed OARRS  - Follow up with oncology for surveillance    - Sudafed, baclofen and flutamide as needed for priapism and follow up with urology as scheduled   - Reviewed role of hypoxia in priapism and emphasized importance of nocturnal hypoxia to help with priapism.   - OTC senna and miralax as needed for constipation   - Follow up in 4-8 has been doing well since discharge from hospital weeks time     Chief Complaint: Hospital follow up for HB SS SCD and vaso occlusive pain      Interval History: Joellen is present with his mother in clinic today. He has been doing well since discharge from hospital, and has not had recurrence of priapism.  He denies any concerns with regards to erection or ejaculation in clinic today.  Sickle cell pain has been well-controlled with his current home oral pain regimen, and  denies acute pain in clinic today.  He is on supplemental oxygen 2 L at night, and is intermittently compliant with this.  We have previously recommended L-glutamine for patient, but has not been compliant with this.  He remains in remission from lymphoma, and has an appointment coming up with oncology.  He denies lymphadenopathy, fevers, unexplained weight loss, or drenching night sweats.  Raundre denies fevers, cough, chest pain or chest tightness, shortness of breath. He is stooling well with prn laxatives. He denies focal neurologic deficits, leg ulcers or peripheral edema      Review of System:   Review of Systems   Constitutional:  Positive for fatigue. Negative for diaphoresis and fever.   HENT:   Negative for nosebleeds and sore throat.    Eyes:  Positive for icterus. Negative for eye problems.   Respiratory:  Negative for chest tightness, cough, hemoptysis, shortness of breath and wheezing.    Cardiovascular:  Negative for chest pain, leg swelling and palpitations.   Gastrointestinal:  Negative for abdominal pain, blood in stool, constipation, diarrhea, nausea and vomiting.   Genitourinary:  Negative for dysuria, frequency and hematuria.    Musculoskeletal:  Negative for arthralgias, back pain, gait problem and myalgias.   Skin:  Negative for itching, rash and wound.   Neurological:  Negative for dizziness, extremity weakness, gait problem, headaches and light-headedness.   Hematological:  Does not bruise/bleed easily.   Psychiatric/Behavioral:  Negative for depression. The patient is not nervous/anxious.         Allergies:   Allergies   Allergen Reactions    Cefepime Hallucinations    Amoxicillin Hives    Ceftriaxone Hives and Rash       Current Medications:   Outpatient Encounter Medications as of 7/24/2024   Medication Sig Dispense Refill    folic acid (Folvite) 1 mg tablet Take 1 tablet (1 mg) by mouth once daily. 30 tablet 11    glutamine, sickle cell, (Endari) 5 gram powder in packet Take 15 g by  mouth 2 times a day. (Patient not taking: Reported on 6/19/2024) 180 each 3    oxyCODONE (Roxicodone) 15 mg immediate release tablet Take 1 tablet (15 mg) by mouth every 6 hours if needed (Severe sickle cell pain). 20 tablet 0     No facility-administered encounter medications on file as of 7/24/2024.       Past Medical History:    has a past medical history of Corrosion of unspecified body region, unspecified degree (12/31/2014), Impetigo (01/04/2024), Personal history of diseases of the blood and blood-forming organs and certain disorders involving the immune mechanism, Personal history of other (healed) physical injury and trauma (01/03/2015), Personal history of other diseases of the circulatory system, Personal history of other diseases of the circulatory system, Rash and other nonspecific skin eruption (09/09/2014), and Sickle-cell disease with pain (Multi) (12/19/2023).    Past Surgical History:    has a past surgical history that includes IR CVC tunneled (6/21/2022); CT guided percutaneous biopsy LYMPH node superficial (11/18/2022); and CT guided percutaneous abdominal retroperitoneum biopsy (11/30/2023).    Family History:   Family History   Problem Relation Name Age of Onset    Sickle cell trait Mother      Sickle cell trait Father      Lung cancer Brother         Social History:   Joellen Narayan  reports that he quit smoking about a year ago. His smoking use included cigars. He has been exposed to tobacco smoke. He has never used smokeless tobacco.  reports current drug use. Drug: Marijuana.    EXAMINATION FINDINGS   /68 (BP Location: Left arm, Patient Position: Sitting, BP Cuff Size: Adult)   Pulse 86   Temp 37.6 °C (99.7 °F) (Temporal)   Wt 76.1 kg (167 lb 11.2 oz)   SpO2 90%   BMI 21.53 kg/m²   1.99 meters squared    Physical Exam  Vitals reviewed.   Constitutional:       General: He is not in acute distress.     Appearance: Normal appearance. He is not toxic-appearing.   HENT:      Nose:  Nose normal. No congestion or rhinorrhea.      Mouth/Throat:      Mouth: Mucous membranes are moist.      Pharynx: No oropharyngeal exudate or posterior oropharyngeal erythema.   Eyes:      General: Scleral icterus present.         Right eye: No discharge.         Left eye: No discharge.      Extraocular Movements: Extraocular movements intact.      Conjunctiva/sclera: Conjunctivae normal.      Pupils: Pupils are equal, round, and reactive to light.   Cardiovascular:      Rate and Rhythm: Normal rate.      Pulses: Normal pulses.      Heart sounds: Normal heart sounds. No murmur heard.     No gallop.   Pulmonary:      Effort: Pulmonary effort is normal. No respiratory distress.      Breath sounds: No wheezing.   Abdominal:      General: Abdomen is flat. Bowel sounds are normal. There is no distension.      Palpations: There is no mass.      Tenderness: There is no abdominal tenderness. There is no guarding or rebound.   Musculoskeletal:         General: No swelling.      Cervical back: Normal range of motion and neck supple.      Right lower leg: No edema.      Left lower leg: No edema.   Skin:     General: Skin is warm.      Capillary Refill: Capillary refill takes less than 2 seconds.      Coloration: Skin is not jaundiced.      Findings: No bruising, erythema or rash.   Neurological:      General: No focal deficit present.      Mental Status: He is alert. Mental status is at baseline.      Cranial Nerves: No cranial nerve deficit.      Motor: No weakness.   Psychiatric:         Mood and Affect: Mood normal.         Behavior: Behavior normal.       LABS   Lab on 11/27/2024   Component Date Value Ref Range Status    Lipase 11/27/2024 20  9 - 82 U/L Final    Amylase 11/27/2024 18 (L)  29 - 103 U/L Final    WBC 11/27/2024 11.0  4.4 - 11.3 x10*3/uL Final    nRBC 11/27/2024 1.2 (H)  0.0 - 0.0 /100 WBCs Final    RBC 11/27/2024 3.21 (L)  4.50 - 5.90 x10*6/uL Final    Hemoglobin 11/27/2024 9.9 (L)  13.5 - 17.5 g/dL Final     Hematocrit 11/27/2024 27.0 (L)  41.0 - 52.0 % Final    MCV 11/27/2024 84  80 - 100 fL Final    MCH 11/27/2024 30.8  26.0 - 34.0 pg Final    MCHC 11/27/2024 36.7 (H)  32.0 - 36.0 g/dL Final    RDW 11/27/2024 25.0 (H)  11.5 - 14.5 % Final    Platelets 11/27/2024 281  150 - 450 x10*3/uL Final    Neutrophils % 11/27/2024 61.3  40.0 - 80.0 % Final    Immature Granulocytes %, Automated 11/27/2024 0.7  0.0 - 0.9 % Final    Immature Granulocyte Count (IG) includes promyelocytes, myelocytes and metamyelocytes but does not include bands. Percent differential counts (%) should be interpreted in the context of the absolute cell counts (cells/UL).    Lymphocytes % 11/27/2024 21.8  13.0 - 44.0 % Final    Monocytes % 11/27/2024 12.5  2.0 - 10.0 % Final    Eosinophils % 11/27/2024 3.2  0.0 - 6.0 % Final    Basophils % 11/27/2024 0.5  0.0 - 2.0 % Final    Neutrophils Absolute 11/27/2024 6.73  1.20 - 7.70 x10*3/uL Final    Percent differential counts (%) should be interpreted in the context of the absolute cell counts (cells/uL).    Immature Granulocytes Absolute, Au* 11/27/2024 0.08  0.00 - 0.70 x10*3/uL Final    Lymphocytes Absolute 11/27/2024 2.39  1.20 - 4.80 x10*3/uL Final    Monocytes Absolute 11/27/2024 1.37 (H)  0.10 - 1.00 x10*3/uL Final    Eosinophils Absolute 11/27/2024 0.35  0.00 - 0.70 x10*3/uL Final    Basophils Absolute 11/27/2024 0.05  0.00 - 0.10 x10*3/uL Final    Automated WBC differential has been confirmed by manual smear.    Glucose 11/27/2024 73 (L)  74 - 99 mg/dL Final    Sodium 11/27/2024 141  136 - 145 mmol/L Final    Potassium 11/27/2024 4.7  3.5 - 5.3 mmol/L Final    MILD HEMOLYSIS DETECTED. The result may be falsely elevated due to hemolysis or other interferents. Clinical correlation is recommended. Repeat testing may be considered.    Chloride 11/27/2024 108 (H)  98 - 107 mmol/L Final    Bicarbonate 11/27/2024 26  21 - 32 mmol/L Final    Anion Gap 11/27/2024 12  10 - 20 mmol/L Final    Urea Nitrogen  "11/27/2024 6  6 - 23 mg/dL Final    Creatinine 11/27/2024 0.61  0.50 - 1.30 mg/dL Final    eGFR 11/27/2024 >90  >60 mL/min/1.73m*2 Final    Calculations of estimated GFR are performed using the 2021 CKD-EPI Study Refit equation without the race variable for the IDMS-Traceable creatinine methods.  https://jasn.asnjournals.org/content/early/2021/09/22/ASN.0842476629    Calcium 11/27/2024 9.3  8.6 - 10.3 mg/dL Final    Albumin 11/27/2024 4.7  3.4 - 5.0 g/dL Final    Alkaline Phosphatase 11/27/2024 122 (H)  33 - 120 U/L Final    Total Protein 11/27/2024 7.0  6.4 - 8.2 g/dL Final    AST 11/27/2024 58 (H)  9 - 39 U/L Final    MILD HEMOLYSIS DETECTED. The result may be falsely elevated due to hemolysis or other interferents. Clinical correlation is recommended. Repeat testing may be considered.    Bilirubin, Total 11/27/2024 8.0 (H)  0.0 - 1.2 mg/dL Final    ALT 11/27/2024 36  10 - 52 U/L Final    Patients treated with Sulfasalazine may generate falsely decreased results for ALT.    Ferritin 11/27/2024 200  20 - 300 ng/mL Final    LDH 11/27/2024 596 (H)  84 - 246 U/L Final    MILD HEMOLYSIS DETECTED. The result may be falsely elevated due to hemolysis or other interferents. Clinical correlation is recommended. Repeat testing may be considered.    Retic % 11/27/2024 27.8 (H)  0.5 - 2.0 % Final    Retic Absolute 11/27/2024 0.892 (H)  0.022 - 0.118 x10*6/uL Final    Reticulocyte Hemoglobin 11/27/2024 34  28 - 38 pg Final    Immature Retic fraction 11/27/2024 25.2 (H)  <=16.0 % Final    Reticulocytes are measured based on a fluorescent technique. The IRF, or immature reticulocyte fraction, is the percent of reticulocytes that show medium (MFR) or high (HFR) fluorescence.  This value can be used to assess the relative maturity of the reticulocyte population in response to anemia. The \"shift reticulocytes\" are not measured by this technique, eliminating the need for their correction in the reticulocyte index.    ABO TYPE " 11/27/2024 B   Final    Rh TYPE 11/27/2024 POS   Final    ANTIBODY SCREEN 11/27/2024 NEG   Final    Hemoglobin A 11/27/2024 20.1 (L)  95.8 - 98.0 % Final    Hemoglobin F 11/27/2024 2.1 (H)  0.0 - 2.0 % Final    Hemoglobin S 11/27/2024 74.1 (H)  <=0.0 % Final    Hemoglobin A2 11/27/2024 3.7 (H)  2.0 - 3.5 % Final    Hemoglobin Identification Interpre* 11/27/2024 See Below (A)  Normal Final    Known Sickle Cell Anemia post-transfusion    Pathologist Review-Hemoglobin Iden* 11/27/2024 Electronically signed out by Estela Martinez MD PhD on 12/2/24 at 2:46 PM.  By the signature on this report, the individual or group listed as making the Final Interpretation/Diagnosis certifies that they have reviewed this case.   Final    Bilirubin, Direct 11/27/2024 0.8 (H)  0.0 - 0.3 mg/dL Final    Albumin, Urine Random 11/27/2024 13.0  Not established mg/L Final    Creatinine, Urine Random 11/27/2024 77.6  20.0 - 370.0 mg/dL Final    Albumin/Creatinine Ratio 11/27/2024 16.8  <30.0 ug/mg Creat Final    RBC Morphology 11/27/2024 See Below   Final    Polychromasia 11/27/2024 Mild   Final    RBC Fragments 11/27/2024 Few   Final    Sickle Cells 11/27/2024 Many   Final    Target Cells 11/27/2024 Few   Final    Ovalocytes 11/27/2024 Few   Final    [place] Cells 11/27/2024 Few   Final                 Ian Cruz MD

## 2025-01-08 ENCOUNTER — OFFICE VISIT (OUTPATIENT)
Dept: HEMATOLOGY/ONCOLOGY | Facility: HOSPITAL | Age: 25
End: 2025-01-08
Payer: COMMERCIAL

## 2025-01-08 VITALS
BODY MASS INDEX: 21.27 KG/M2 | TEMPERATURE: 98.1 F | OXYGEN SATURATION: 99 % | DIASTOLIC BLOOD PRESSURE: 54 MMHG | HEART RATE: 75 BPM | SYSTOLIC BLOOD PRESSURE: 107 MMHG | WEIGHT: 165.7 LBS

## 2025-01-08 DIAGNOSIS — D57.00 SICKLE CELL ANEMIA WITH PAIN (MULTI): ICD-10-CM

## 2025-01-08 PROCEDURE — 1036F TOBACCO NON-USER: CPT | Performed by: NURSE PRACTITIONER

## 2025-01-08 PROCEDURE — 99214 OFFICE O/P EST MOD 30 MIN: CPT | Performed by: NURSE PRACTITIONER

## 2025-01-08 ASSESSMENT — PAIN SCALES - GENERAL: PAINLEVEL_OUTOF10: 7

## 2025-01-08 NOTE — PROGRESS NOTES
Patient ID: Joellen Narayan is a 24 y.o. male.  Referring Physician: Nikki Villar PA-C  [id number]  [place] [id number]  Primary Care Provider: No Assigned PCP Generic Provider, MD  Visit Type: Follow Up      Subjective  24 year old black male presents for follow up and post hospitalization. He was hospitalized 12/22-12/31 for chest pain.  OARRS reviewed. He has Sickle Cell SS disease and was exchanged for PRBC while hospitalized. He does not do any disease modifying care. He will have his gallbladder removed on 1/31/2025. He does smoke Black and Milds. He uses Oxygen at night at 2-3 liters minute. He denies having a lot of pain. He uses Oxycodone for pain. He denies depression and states he has some priapism and uses Sudafed intermittently. He works and needs a back to work slip.      HPI    Review of Systems - Oncology     Objective   BSA: 1.98 meters squared  /54 (BP Location: Left arm, Patient Position: Sitting, BP Cuff Size: Adult)   Pulse 75   Temp 36.7 °C (98.1 °F) (Temporal)   Wt 75.2 kg (165 lb 11.2 oz)   SpO2 99%   BMI 21.27 kg/m²      has a past medical history of Corrosion of unspecified body region, unspecified degree (12/31/2014), Impetigo (01/04/2024), Personal history of diseases of the blood and blood-forming organs and certain disorders involving the immune mechanism, Personal history of other (healed) physical injury and trauma (01/03/2015), Personal history of other diseases of the circulatory system, Personal history of other diseases of the circulatory system, Rash and other nonspecific skin eruption (09/09/2014), and Sickle-cell disease with pain (Multi) (12/19/2023).   has a past surgical history that includes IR CVC tunneled (6/21/2022); CT guided percutaneous biopsy LYMPH node superficial (11/18/2022); and CT guided percutaneous abdominal retroperitoneum biopsy (11/30/2023).  Family History   Problem Relation Name Age of Onset    Sickle cell trait Mother      Sickle  cell trait Father      Lung cancer Brother       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 (CMS/HCC)     1/18/2024 - 6/7/2024 Chemotherapy    R-CHOP (Cyclophosphamide / DOXOrubicin / VinCRIStine / PredniSONE) + RiTUXimab, 21 Day Cycles         Joellen Narayan  reports that he quit smoking about a year ago. His smoking use included cigars. He has been exposed to tobacco smoke. He has never used smokeless tobacco.  He  reports no history of alcohol use.  He  reports current drug use. Drug: Marijuana.    Physical Exam  Vitals reviewed.   Constitutional:       Appearance: Normal appearance. He is normal weight.   HENT:      Head: Normocephalic and atraumatic.      Nose: Nose normal.      Mouth/Throat:      Mouth: Mucous membranes are moist.   Eyes:      General: Scleral icterus present.      Pupils: Pupils are equal, round, and reactive to light.   Cardiovascular:      Rate and Rhythm: Normal rate and regular rhythm.      Pulses: Normal pulses.   Pulmonary:      Effort: Pulmonary effort is normal.   Abdominal:      General: Abdomen is flat.      Palpations: Abdomen is soft.   Musculoskeletal:         General: Normal range of motion.      Cervical back: Normal range of motion.   Skin:     General: Skin is warm and dry.      Capillary Refill: Capillary refill takes 2 to 3 seconds.   Neurological:      Mental Status: He is alert and oriented to person, place, and time.   Psychiatric:         Mood and Affect: Mood normal.     WBC   Date/Time Value Ref Range Status   12/31/2024 07:46 AM 8.3 4.4 - 11.3 x10*3/uL Final   12/30/2024 08:20 AM 9.4 4.4 - 11.3 x10*3/uL Final   12/29/2024 07:50 AM 9.4 4.4 - 11.3 x10*3/uL Final     nRBC   Date Value Ref Range Status   12/31/2024 1.1 (H) 0.0 - 0.0 /100 WBCs Final   12/30/2024 0.5 (H) 0.0 - 0.0 /100 WBCs Final    12/29/2024 0.7 (H) 0.0 - 0.0 /100 WBCs Final     RBC   Date Value Ref Range Status   12/31/2024 3.47 (L) 4.50 - 5.90 x10*6/uL Final   12/30/2024 3.40 (L) 4.50 - 5.90 x10*6/uL Final   12/29/2024 3.22 (L) 4.50 - 5.90 x10*6/uL Final     Hemoglobin   Date Value Ref Range Status   12/31/2024 9.8 (L) 13.5 - 17.5 g/dL Final   12/30/2024 9.4 (L) 13.5 - 17.5 g/dL Final   12/29/2024 8.9 (L) 13.5 - 17.5 g/dL Final     Hematocrit   Date Value Ref Range Status   12/31/2024 28.9 (L) 41.0 - 52.0 % Final   12/30/2024 28.3 (L) 41.0 - 52.0 % Final   12/29/2024 26.5 (L) 41.0 - 52.0 % Final     MCV   Date/Time Value Ref Range Status   12/31/2024 07:46 AM 83 80 - 100 fL Final   12/30/2024 08:20 AM 83 80 - 100 fL Final   12/29/2024 07:50 AM 82 80 - 100 fL Final     MCH   Date/Time Value Ref Range Status   12/31/2024 07:46 AM 28.2 26.0 - 34.0 pg Final   12/30/2024 08:20 AM 27.6 26.0 - 34.0 pg Final   12/29/2024 07:50 AM 27.6 26.0 - 34.0 pg Final     MCHC   Date/Time Value Ref Range Status   12/31/2024 07:46 AM 33.9 32.0 - 36.0 g/dL Final   12/30/2024 08:20 AM 33.2 32.0 - 36.0 g/dL Final   12/29/2024 07:50 AM 33.6 32.0 - 36.0 g/dL Final     RDW   Date/Time Value Ref Range Status   12/31/2024 07:46 AM 21.6 (H) 11.5 - 14.5 % Final   12/30/2024 08:20 AM 21.9 (H) 11.5 - 14.5 % Final   12/29/2024 07:50 AM 21.6 (H) 11.5 - 14.5 % Final     Platelets   Date/Time Value Ref Range Status   12/31/2024 07:46  (H) 150 - 450 x10*3/uL Final   12/30/2024 08:20  (H) 150 - 450 x10*3/uL Final   12/29/2024 07:50  (H) 150 - 450 x10*3/uL Final     MPV   Date/Time Value Ref Range Status   10/21/2023 03:02 AM 9.6 7.5 - 11.5 fL Final     Neutrophils %   Date/Time Value Ref Range Status   12/31/2024 07:46 AM 57.7 40.0 - 80.0 % Final   12/30/2024 08:20 AM 62.3 40.0 - 80.0 % Final   12/28/2024 07:21 AM 62.7 40.0 - 80.0 % Final     Immature Granulocytes %, Automated   Date/Time Value Ref Range Status   12/31/2024 07:46 AM 0.7 0.0 - 0.9 % Final      Comment:     Immature Granulocyte Count (IG) includes promyelocytes, myelocytes and metamyelocytes but does not include bands. Percent differential counts (%) should be interpreted in the context of the absolute cell counts (cells/UL).   12/30/2024 08:20 AM 0.7 0.0 - 0.9 % Final     Comment:     Immature Granulocyte Count (IG) includes promyelocytes, myelocytes and metamyelocytes but does not include bands. Percent differential counts (%) should be interpreted in the context of the absolute cell counts (cells/UL).   12/29/2024 07:50 AM 0.7 0.0 - 0.9 % Final     Comment:     Immature Granulocyte Count (IG) includes promyelocytes, myelocytes and metamyelocytes but does not include bands. Percent differential counts (%) should be interpreted in the context of the absolute cell counts (cells/UL).     Lymphocytes %, Manual   Date/Time Value Ref Range Status   12/29/2024 07:50 AM 31.6 13.0 - 44.0 % Final   12/24/2024 11:53 AM 10.3 13.0 - 44.0 % Final   12/23/2024 07:50 AM 7.8 13.0 - 44.0 % Final     Lymphocytes %   Date/Time Value Ref Range Status   12/31/2024 07:46 AM 23.9 13.0 - 44.0 % Final   12/30/2024 08:20 AM 19.7 13.0 - 44.0 % Final   12/28/2024 07:21 AM 20.1 13.0 - 44.0 % Final     Monocytes %, Manual   Date/Time Value Ref Range Status   12/29/2024 07:50 AM 6.8 2.0 - 10.0 % Final   12/24/2024 11:53 AM 7.8 2.0 - 10.0 % Final   12/23/2024 07:50 AM 6.9 2.0 - 10.0 % Final     Monocytes %   Date/Time Value Ref Range Status   12/31/2024 07:46 AM 12.3 2.0 - 10.0 % Final   12/30/2024 08:20 AM 12.2 2.0 - 10.0 % Final   12/28/2024 07:21 AM 12.1 2.0 - 10.0 % Final     Eosinophils %, Manual   Date/Time Value Ref Range Status   12/29/2024 07:50 AM 4.3 0.0 - 6.0 % Final   12/24/2024 11:53 AM 1.7 0.0 - 6.0 % Final   12/23/2024 07:50 AM 3.4 0.0 - 6.0 % Final     Eosinophils %   Date/Time Value Ref Range Status   12/31/2024 07:46 AM 4.7 0.0 - 6.0 % Final   12/30/2024 08:20 AM 4.4 0.0 - 6.0 % Final   12/28/2024 07:21 AM 4.0 0.0 -  6.0 % Final     Basophils %, Manual   Date/Time Value Ref Range Status   12/29/2024 07:50 AM 0.0 0.0 - 2.0 % Final   12/24/2024 11:53 AM 0.0 0.0 - 2.0 % Final   12/23/2024 07:50 AM 0.0 0.0 - 2.0 % Final     Basophils %   Date/Time Value Ref Range Status   12/31/2024 07:46 AM 0.7 0.0 - 2.0 % Final   12/30/2024 08:20 AM 0.7 0.0 - 2.0 % Final   12/28/2024 07:21 AM 0.5 0.0 - 2.0 % Final     Neutrophils Absolute   Date/Time Value Ref Range Status   12/31/2024 07:46 AM 4.78 1.20 - 7.70 x10*3/uL Final     Comment:     Percent differential counts (%) should be interpreted in the context of the absolute cell counts (cells/uL).   12/30/2024 08:20 AM 5.84 1.20 - 7.70 x10*3/uL Final     Comment:     Percent differential counts (%) should be interpreted in the context of the absolute cell counts (cells/uL).   12/28/2024 07:21 AM 6.83 1.20 - 7.70 x10*3/uL Final     Comment:     Percent differential counts (%) should be interpreted in the context of the absolute cell counts (cells/uL).     Immature Granulocytes Absolute, Automated   Date/Time Value Ref Range Status   12/31/2024 07:46 AM 0.06 0.00 - 0.70 x10*3/uL Final   12/30/2024 08:20 AM 0.07 0.00 - 0.70 x10*3/uL Final   12/29/2024 07:50 AM 0.07 0.00 - 0.70 x10*3/uL Final     Lymphocytes Absolute   Date/Time Value Ref Range Status   12/31/2024 07:46 AM 1.98 1.20 - 4.80 x10*3/uL Final   12/30/2024 08:20 AM 1.85 1.20 - 4.80 x10*3/uL Final   12/28/2024 07:21 AM 2.19 1.20 - 4.80 x10*3/uL Final     Monocytes Absolute   Date/Time Value Ref Range Status   12/31/2024 07:46 AM 1.02 (H) 0.10 - 1.00 x10*3/uL Final   12/30/2024 08:20 AM 1.14 (H) 0.10 - 1.00 x10*3/uL Final   12/28/2024 07:21 AM 1.32 (H) 0.10 - 1.00 x10*3/uL Final     Eosinophils Absolute   Date/Time Value Ref Range Status   12/31/2024 07:46 AM 0.39 0.00 - 0.70 x10*3/uL Final   12/30/2024 08:20 AM 0.41 0.00 - 0.70 x10*3/uL Final   12/28/2024 07:21 AM 0.44 0.00 - 0.70 x10*3/uL Final     Eosinophils Absolute, Manual   Date/Time  "Value Ref Range Status   12/29/2024 07:50 AM 0.40 0.00 - 0.70 x10*3/uL Final   12/24/2024 11:53 AM 0.23 0.00 - 0.70 x10*3/uL Final   12/23/2024 07:50 AM 0.46 0.00 - 0.70 x10*3/uL Final     Basophils Absolute   Date/Time Value Ref Range Status   12/31/2024 07:46 AM 0.06 0.00 - 0.10 x10*3/uL Final   12/30/2024 08:20 AM 0.07 0.00 - 0.10 x10*3/uL Final   12/28/2024 07:21 AM 0.05 0.00 - 0.10 x10*3/uL Final     Basophils Absolute, Manual   Date/Time Value Ref Range Status   12/29/2024 07:50 AM 0.00 0.00 - 0.10 x10*3/uL Final   12/24/2024 11:53 AM 0.00 0.00 - 0.10 x10*3/uL Final   12/23/2024 07:50 AM 0.00 0.00 - 0.10 x10*3/uL Final       No components found for: \"PT\"  aPTT   Date/Time Value Ref Range Status   12/22/2024 09:51 AM 25 (L) 27 - 38 seconds Final   09/13/2024 04:15 PM 23 (L) 27 - 38 seconds Final   07/16/2024 03:43 PM 28 27 - 38 seconds Final       Assessment/Plan  3 month follow up  Follow up with Gall bladder removal 1/31/2025  Cut down or quit smoking  Continue us of Oxygen 2-3 liters at night               "

## 2025-01-09 ENCOUNTER — APPOINTMENT (OUTPATIENT)
Dept: SURGERY | Facility: CLINIC | Age: 25
End: 2025-01-09
Payer: COMMERCIAL

## 2025-01-10 ENCOUNTER — CLINICAL SUPPORT (OUTPATIENT)
Dept: PREADMISSION TESTING | Facility: HOSPITAL | Age: 25
End: 2025-01-10
Payer: COMMERCIAL
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
## 2025-01-16 ENCOUNTER — PRE-ADMISSION TESTING (OUTPATIENT)
Dept: PREADMISSION TESTING | Facility: HOSPITAL | Age: 25
End: 2025-01-16
Payer: COMMERCIAL

## 2025-01-16 VITALS
OXYGEN SATURATION: 96 % | HEIGHT: 74 IN | DIASTOLIC BLOOD PRESSURE: 68 MMHG | HEART RATE: 82 BPM | WEIGHT: 159 LBS | TEMPERATURE: 98.2 F | BODY MASS INDEX: 20.41 KG/M2 | SYSTOLIC BLOOD PRESSURE: 134 MMHG

## 2025-01-16 DIAGNOSIS — K80.50 CALCULUS OF BILE DUCT WITHOUT CHOLANGITIS OR BILIARY OBSTRUCTION: ICD-10-CM

## 2025-01-16 DIAGNOSIS — I51.7 LEFT VENTRICULAR DILATION: ICD-10-CM

## 2025-01-16 DIAGNOSIS — I51.7 LVH (LEFT VENTRICULAR HYPERTROPHY): ICD-10-CM

## 2025-01-16 DIAGNOSIS — I51.7 CARDIOMEGALY: ICD-10-CM

## 2025-01-16 DIAGNOSIS — Z01.818 PREOPERATIVE EXAMINATION: Primary | ICD-10-CM

## 2025-01-16 DIAGNOSIS — R01.1 HEART MURMUR: ICD-10-CM

## 2025-01-16 DIAGNOSIS — D57.09 SICKLE CELL DISEASE WITH CRISIS AND OTHER COMPLICATION: ICD-10-CM

## 2025-01-16 DIAGNOSIS — F17.200 NICOTINE USE DISORDER: ICD-10-CM

## 2025-01-16 LAB
ABO GROUP (TYPE) IN BLOOD: NORMAL
ALBUMIN SERPL BCP-MCNC: 4.8 G/DL (ref 3.4–5)
ALP SERPL-CCNC: 156 U/L (ref 33–120)
ALT SERPL W P-5'-P-CCNC: 19 U/L (ref 10–52)
ANION GAP SERPL CALC-SCNC: 16 MMOL/L (ref 10–20)
ANTIBODY SCREEN: NORMAL
APTT PPP: 29 SECONDS (ref 27–38)
AST SERPL W P-5'-P-CCNC: 33 U/L (ref 9–39)
BILIRUB SERPL-MCNC: 6.4 MG/DL (ref 0–1.2)
BUN SERPL-MCNC: 8 MG/DL (ref 6–23)
CALCIUM SERPL-MCNC: 10 MG/DL (ref 8.6–10.6)
CHLORIDE SERPL-SCNC: 107 MMOL/L (ref 98–107)
CO2 SERPL-SCNC: 22 MMOL/L (ref 21–32)
CREAT SERPL-MCNC: 0.57 MG/DL (ref 0.5–1.3)
EGFRCR SERPLBLD CKD-EPI 2021: >90 ML/MIN/1.73M*2
ERYTHROCYTE [DISTWIDTH] IN BLOOD BY AUTOMATED COUNT: 21.3 % (ref 11.5–14.5)
GLUCOSE SERPL-MCNC: 70 MG/DL (ref 74–99)
HCT VFR BLD AUTO: 34.6 % (ref 41–52)
HGB BLD-MCNC: 11.9 G/DL (ref 13.5–17.5)
INR PPP: 1.1 (ref 0.9–1.1)
MCH RBC QN AUTO: 28.7 PG (ref 26–34)
MCHC RBC AUTO-ENTMCNC: 34.4 G/DL (ref 32–36)
MCV RBC AUTO: 84 FL (ref 80–100)
NRBC BLD-RTO: 1 /100 WBCS (ref 0–0)
PLATELET # BLD AUTO: 450 X10*3/UL (ref 150–450)
POTASSIUM SERPL-SCNC: 4.7 MMOL/L (ref 3.5–5.3)
PROT SERPL-MCNC: 7.7 G/DL (ref 6.4–8.2)
PROTHROMBIN TIME: 12.8 SECONDS (ref 9.8–12.8)
RBC # BLD AUTO: 4.14 X10*6/UL (ref 4.5–5.9)
RH FACTOR (ANTIGEN D): NORMAL
SODIUM SERPL-SCNC: 140 MMOL/L (ref 136–145)
WBC # BLD AUTO: 11.3 X10*3/UL (ref 4.4–11.3)

## 2025-01-16 PROCEDURE — 99214 OFFICE O/P EST MOD 30 MIN: CPT

## 2025-01-16 PROCEDURE — 86900 BLOOD TYPING SEROLOGIC ABO: CPT

## 2025-01-16 PROCEDURE — 85027 COMPLETE CBC AUTOMATED: CPT

## 2025-01-16 PROCEDURE — 87081 CULTURE SCREEN ONLY: CPT

## 2025-01-16 PROCEDURE — 80053 COMPREHEN METABOLIC PANEL: CPT

## 2025-01-16 PROCEDURE — 36415 COLL VENOUS BLD VENIPUNCTURE: CPT

## 2025-01-16 PROCEDURE — 99406 BEHAV CHNG SMOKING 3-10 MIN: CPT

## 2025-01-16 PROCEDURE — 85610 PROTHROMBIN TIME: CPT

## 2025-01-16 RX ORDER — CHLORHEXIDINE GLUCONATE ORAL RINSE 1.2 MG/ML
15 SOLUTION DENTAL AS NEEDED
Qty: 473 ML | Refills: 0 | Status: ON HOLD | OUTPATIENT
Start: 2025-01-16 | End: 2025-01-31

## 2025-01-16 RX ORDER — CHLORHEXIDINE GLUCONATE 40 MG/ML
SOLUTION TOPICAL DAILY PRN
Qty: 473 ML | Refills: 0 | Status: ON HOLD | OUTPATIENT
Start: 2025-01-16

## 2025-01-16 ASSESSMENT — ENCOUNTER SYMPTOMS
VISUAL CHANGE: 0
TREMORS: 0
WHEEZING: 0
CHILLS: 0
BRUISES/BLEEDS EASILY: 0
VERTIGO: 0
VOMITING: 0
SHORTNESS OF BREATH: 0
ABDOMINAL PAIN: 0
MYALGIAS: 0
DYSURIA: 0
LIGHT-HEADEDNESS: 0
EXCESSIVE BLEEDING: 0
JOINT SWELLING: 0
PALPITATIONS: 0
FEVER: 0
SINUS CONGESTION: 0
LIMITED RANGE OF MOTION: 0
NECK SWELLING: 0
NERVOUS/ANXIOUS: 0
COUGH: 0
NECK STIFFNESS: 0
DIARRHEA: 0
UNEXPECTED WEIGHT CHANGE: 0
VOICE CHANGE: 0
WOUND: 0
DOUBLE VISION: 0
BLOOD IN STOOL: 0
HEMOPTYSIS: 0
DIFFICULTY URINATING: 0
NECK PAIN: 0
DYSPNEA WITH EXERTION: 0
SKIN CHANGES: 0
TROUBLE SWALLOWING: 0
NECK MASS: 0
ABDOMINAL DISTENTION: 0
NAUSEA: 0
WEAKNESS: 0
CONSTIPATION: 0
NUMBNESS: 0
EYE DISCHARGE: 0
RHINORRHEA: 0
ARTHRALGIAS: 0
CONFUSION: 0
PND: 0
POLYDIPSIA: 0
DYSPNEA AT REST: 0

## 2025-01-16 ASSESSMENT — DUKE ACTIVITY SCORE INDEX (DASI)
CAN YOU TAKE CARE OF YOURSELF (EAT, DRESS, BATHE, OR USE TOILET): YES
TOTAL_SCORE: 58.2
CAN YOU RUN A SHORT DISTANCE: YES
CAN YOU CLIMB A FLIGHT OF STAIRS OR WALK UP A HILL: YES
CAN YOU DO MODERATE WORK AROUND THE HOUSE LIKE VACUUMING, SWEEPING FLOORS OR CARRYING GROCERIES: YES
CAN YOU PARTICIPATE IN STRENOUS SPORTS LIKE SWIMMING, SINGLES TENNIS, FOOTBALL, BASKETBALL, OR SKIING: YES
CAN YOU HAVE SEXUAL RELATIONS: YES
CAN YOU DO YARD WORK LIKE RAKING LEAVES, WEEDING OR PUSHING A MOWER: YES
CAN YOU WALK INDOORS, SUCH AS AROUND YOUR HOUSE: YES
DASI METS SCORE: 9.9
CAN YOU PARTICIPATE IN MODERATE RECREATIONAL ACTIVITIES LIKE GOLF, BOWLING, DANCING, DOUBLES TENNIS OR THROWING A BASEBALL OR FOOTBALL: YES
CAN YOU WALK A BLOCK OR TWO ON LEVEL GROUND: YES
CAN YOU DO HEAVY WORK AROUND THE HOUSE LIKE SCRUBBING FLOORS OR LIFTING AND MOVING HEAVY FURNITURE: YES
CAN YOU DO LIGHT WORK AROUND THE HOUSE LIKE DUSTING OR WASHING DISHES: YES

## 2025-01-16 ASSESSMENT — LIFESTYLE VARIABLES: SMOKING_STATUS: SMOKER

## 2025-01-16 NOTE — PREPROCEDURE INSTRUCTIONS
Thank you for visiting [facility] ([facility]) today for your pre-procedure evaluation, you were seen by     Lindsay Vaca PA-C   Department of Anesthesiology and Perioperative Medicine  Main phone [phone]  Fax [phone]    Complete blood work either 1/28 or 1/29.    This summary includes instructions and information to aid you during your perioperative period.  Please read carefully. If you have any questions about your visit today, please call the number listed above.  If you become ill or have any changes to your health before your surgery, please contact your primary care provider and alert your surgeon.    Preparing for Surgery       Preoperative Fasting Guidelines    Why must I stop eating and drinking near surgery time?  With sedation, food or liquid in your stomach can enter your lungs causing serious complications  Food can increase nausea and vomiting  When do I need to stop eating and drinking before my surgery?  Do not eat any food after midnight the night before your surgery/procedure.  You may have up to 13.5 ounces of clear liquid until TWO hours before your instructed arrival time to the hospital.  This includes water, black tea/coffee, (no milk or cream) apple juice, and electrolyte drinks (Gatorade)  You may chew gum until TWO hours before your surgery/procedure    Tobacco and Alcohol;  Do not drink alcohol or smoke within 24 hours of surgery.  It is best to quit smoking for as long as possible before any surgery or procedure.    Quitting Smoking; Recognizing Dangerous Situations:  1-Alcohol use during the first month after quitting, 2-Being around smoke or someone who smokes 3-Times situation routinely smoked  4-Triggers-car, breaks, coffee, when awakening, social events  Coping Skills: 1-Learning new ways to manage stress 2-Exercising 3-Relaxation breathing   4-Change routines 5-Distraction techniques    Websites:  Smoke-Free - offers free text messages and an  "rajat to help you quit. Info includes eating and mood issues that may come with quitting. There is a Live Helpline to talk to an expert. Go to smokefree.gov; Become an Ex-Smoker - the free EX Plan is based on scientific research and useful advice from ex-smokers. It isn't just about quitting smoking.  It's about re-learning life without cigarettes using a 3-step program.  Go to becomeanex.Wifi.com   Centers for Disease Control offer many suggestions for helping you quit. Includes a Quit Guide and real-life stories. There are sections for specific groups such as LGBT, , different ethnic groups, and pregnant women.  Go to cdc.gov/tobacco/campaign/tips    Other Resources:  [place] Tobacco Quit Line - call 1-800-QUIT-NOW or 1-174.232.6905.  [employer] 2-1-1 - to find local programs and resources. Call 211 or go to Breather.Wifi.com.  [facility] Tobacco Cessation Program - call 878-953-8094.  American Lung Association offers classes for quitting smoking. Some places may charge a fee. For a list of classes, go to lung.org or call 1-[employer].     Some things to think about:; The health benefits of quitting smoking can help most of the major parts of your body. There is no safe amount of cigarette smoke. Quitting smoking can add years to your life. When you quit, you'll also protect your loved ones from dangerous secondhand smoke. Make a plan, join a support group, and talk to your physician to assist in quitting smoking.                                                                                                                   Other Instructions  Why did I have my nose, under my arms, and groin swabbed? The purpose of the swab is to identify Staphylococcus aureus inside your nose or on your skin.  The swab was sent to the laboratory for culture.  A positive swab/culture for Staphylococcus aureus is called colonization or carriage.   What is Staphylococcus aureus? Staphylococcus aureus, also known as \"staph\", is a " germ found on the skin or in the nose of healthy people.  Sometimes Staphylococcus aureus can get into the body and cause an infection.  This can be minor (such as pimples, boils, or other skin problems).  It might also be serious (such as a blood infection, pneumonia, or a surgical site infection). What is Staphylococcus aureus colonization or carriage? Colonization or carriage means that a person has the germ but is not sick from it.  These bacteria can be spread on the hands or when breathing or sneezing. How is Staphylococcus aureus spread? It is most often spread by close contact with a person or item that carries it. What happens if my culture is positive for Staphylococcus aureus? Your doctor/medical team will use this information to guide any antibiotic treatment which may be necessary.  Regardless of the culture results, we will clean the inside of your nose with a betadine swab just before you have your surgery. Will I get an infection if I have Staphylococcus aureus in my nose or on my skin? Anyone can get an infection with Staphylococcus aureus.  However, the best way to reduce your risk of infection is to follow the instructions provided to you for the use of your CHG soap and dental rinse.  , Body Wash; What is a home preoperative antibacterial shower? This shower is a way of cleaning the skin with a germ-killing solution before surgery.  The solution contains chlorhexidine, commonly known as CHG.  CHG is a skin cleanser with germ-killing ability.  Let your doctor know if you are allergic to chlorhexidine. Why do I need to take a preoperative antibacterial shower? Skin is not sterile.  It is best to try to make your skin as free of germs as possible before surgery.  Proper cleansing with a germ-killing soap before surgery can lower the number of germs on your skin.  This helps to reduce the risk of infection at the surgical site.  Following the instructions listed below will help you prepare your skin  for surgery.   How do I use the solution? Steps:  Begin using your CHG soap 5 days before your scheduled surgery on ___________.   First, wash and rinse your hair using the CHG soap. Keep CHG soap away from ear canals and eyes.  Rinse completely, do not condition.  Hair extensions should be removed. , Oral/Dental Rinse: What is oral/dental rinse?  It is a mouthwash. It is a way of cleaning the mouth with a germ-killing solution before your surgery.  The solution contains chlorhexidine, commonly known as CHG. It is used inside the mouth to kill a bacteria known as Staphylococcus aureus.  Let your doctor know if you are allergic to Chlorhexidine. Why do I need to use CHG oral/dental rinse? The CHG oral/dental rinse helps to kill a bacteria in your mouth known as Staphylococcus aureus.  This reduces the risk of infection at the surgical site.  Using your CHG oral/dental rinse STEPS: Use your CHG oral/dental rinse after you brush your teeth the night before (at bedtime) and the morning of your surgery.  Follow all directions on your prescription label.  Use the cap on the container to measure 15 ml.  Swish (gargle if you can) the mouthwash in your mouth for at least 30 seconds, (do not swallow) and spit out.  After you use your CHG rinse, do not rinse your mouth with water, drink or eat.  Please refer to the prescription label for the appropriate time to resume oral intake What side effects might I have using the CHG oral/dental rinse? CHG rinse will stick to plaque on the teeth.  Brush and floss just before use.  Teeth brushing will help avoid staining of plaque during use.       The Week before Surgery        Seven days before Surgery  Check your CPM medication instructions  Do the exercises provided to you by CPM   Arrange for a responsible, adult licensed  to take you home after surgery and stay with you for 24 hours.  You will not be permitted to drive yourself home if you have received any  anesthetic/sedation  Five days before surgery  Check your CPM medication instructions  Do the exercises provided to you by CPM   Start using Chlorhexidene (CHG) body wash if prescribed (Continue till day of surgery)      The Day before Surgery       Check your CPM medication and all other CPM instructions including when to stop eating and drinking  You will be called with your arrival time for surgery in the late afternoon.  If you do not receive a call please reach out to your surgeon's office.  Do not smoke or drink 24 hours before surgery  Prepare items to bring with you to the hospital  Shower with your chlorhexidine wash if prescribed  Brush your teeth and use your chlorhexidine dental rinse if prescribed    The Day of Surgery       Check your CPM medication instructions  Ensure you follow the instructions for when to stop eating and drinking  Shower, if prescribed use CHG.  Do not apply any lotions, creams, moisturizers, perfume or deodorant  Brush your teeth and use your CHG dental rinse if prescribed  Wear loose comfortable clothing  Avoid make-up  Remove  jewelry and piercings, consider professional piercing removal with a plastic spacer if needed  Bring photo ID and Insurance card  Bring an accurate medication list that includes medication dose, frequency and allergies  Bring a copy of your advanced directives (will, health care power of )  Bring any devices and controllers as well as medical devices you have been provided with for surgery (CPAP, slings, braces, etc.)  Dentures, eyeglasses, and contacts will be removed before surgery, please bring cases for contacts or glasses    Preoperative Deep Breathing Exercises    Why it is important to do deep breathing exercises before my surgery?  Deep breathing exercises strengthen your breathing muscles.  This helps you to recover after your surgery and decreases the chance of breathing complications.    How are the deep breathing exercises done?  Sit  straight with your back supported.  Breathe in deeply and slowly through your nose. Your lower rib cage should expand and your abdomen may move forward.  Hold that breath for 3 to 5 seconds.  Breathe out through pursed lips, slowly and completely.  Rest and repeat 10 times every hour while awake.  Rest longer if you become dizzy or lightheaded.      Preoperative Brain Exercises    What are brain exercises?  A brain exercise is any activity that engages your thinking (cognitive) skills.    What types of activities are considered brain exercises?  Jigsaw puzzles, crossword puzzles, word jumble, memory games, word search, and many more.  Many can be found free online or on your phone via a mobile rajat.    Why should I do brain exercises before my surgery?  More recent research has shown brain exercise before surgery can lower the risk of postoperative delirium (confusion) which can be especially important for older adults.  Patients who did brain exercises for 5 to 10 hours the days before surgery, cut their risk of postoperative delirium in half up to 1 week after surgery.    Sit-to-Stand Exercise    What is the sit-to-stand exercise?  The sit-to-stand exercise strengthens the muscles of your lower body and muscles in the center of your body (core muscles for stability) helping to maintain and improve your strength and mobility.  How do I do the sit-to-stand exercise?  The goal is to do this exercise without using your arms or hands.  If this is too difficult, use your arms and hands or a chair with armrests to help slowly push yourself to the standing position and lower yourself back to the sitting position. As the movement becomes easier use your arms and hands less.    Steps to the sit-to-stand exercise  Sit up tall in a sturdy chair, knees bent, feet flat on the floor shoulder-width apart.  Shift your hips/pelvis forward in the chair to correctly position yourself for the next movement.  Lean forward at your  hips.  Stand up straight putting equal weight on both feet.  Check to be sure you are properly aligned with the chair, in a slow controlled movement sit back down.  Repeat this exercise 10-15 times.  If needed you can do it fewer times until your strength improves.  Rest for 1 minute.  Do another 10-15 sit-to-stand exercises.  Try to do this in the morning and evening.       Simple things you can do to help prevent blood clots     Blood clots are blockages that can form in the body's veins. When a blood clot forms in your deep veins, it may be called a deep vein thrombosis, or DVT for short. Blood clots can happen in any part of the body where blood flows, but they are most common in the arms and legs. If a piece of a blood clot breaks free and travels to the lungs, it is called a pulmonary embolus (PE). A PE can be a very serious problem.      Being in the hospital or having surgery can raise your chances of getting a blood clot because you may not be well enough to move around as much as you normally do.         Ways you can help prevent blood clots in the hospital       Wearing SCDs  SCDs stands for Sequential Compression Devices.   SCDs are special sleeves that wrap around your legs. They attach to a pump that fills them with air to gently squeeze your legs every few minutes.  This helps return the blood in your legs to your heart.   SCDs should only be taken off when walking or bathing. SCDs may not be comfortable, but they can help save your life.              Pump SCD leg sleeves  Wearing compression stockings - if your doctor orders them. These special snug-fitting stockings gently squeeze your legs to help blood flow.       Walking. Walking helps move the blood in your legs.   If your doctor says it is ok, try walking the halls at least   5 times a day. Ask us to help you get up, so you don't fall.      Taking any blood-thinning medicines your doctor orders.              Ways you can help prevent blood  clots at home         Wearing compression stockings - if your doctor orders them.   Walking - to help move the blood in your legs.    Taking any blood-thinning medicines your doctor orders.      Signs of a blood clot or PE    Tell your doctor or nurse right away if you have any of the problems listed below.         If you are at home, seek medical care right away. Call 911 for chest pain or problems breathing.            Signs of a blood clot (DVT) - such as pain, swelling, redness, or warmth in your arm or legs.  Signs of a pulmonary embolism (PE) - such as chest pain or feeling short of breath

## 2025-01-16 NOTE — CPM/PAT H&P
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    Collection Time: 01/20/25  7:15 AM   Result Value Ref Range    Glucose 72 (L) 74 - 99 mg/dL    Sodium 140 136 - 145 mmol/L    Potassium 3.9 3.5 - 5.3 mmol/L    Chloride 104 98 - 107 mmol/L    Bicarbonate 27 21 - 32 mmol/L    Anion Gap 13 10 - 20 mmol/L    Urea Nitrogen 7 6 - 23 mg/dL    Creatinine 0.55 0.50 - 1.30 mg/dL    eGFR >90 >60 mL/min/1.73m*2    Calcium 9.3 8.6 - 10.6 mg/dL    Albumin 4.7 3.4 - 5.0 g/dL    Alkaline Phosphatase 137 (H) 33 - 120 U/L    Total Protein 7.1 6.4 - 8.2 g/dL    AST 37 9 - 39 U/L    Bilirubin, Total 8.6 (H) 0.0 - 1.2 mg/dL    ALT 20 10 - 52 U/L   Lactate Dehydrogenase    Collection Time: 01/20/25  7:15 AM   Result Value Ref Range     (H) 84 - 246 U/L   Reticulocytes    Collection Time: 01/20/25  7:15 AM   Result Value Ref Range    Retic % 20.4 (H) 0.5 - 2.0 %    Retic Absolute 0.728 (H) 0.022 - 0.118 x10*6/uL    Reticulocyte Hemoglobin 33 28 - 38 pg    Immature Retic fraction 22.0 (H) <=16.0 %   Manual Differential    Collection Time: 01/20/25  7:15 AM   Result Value Ref Range    Neutrophils %, Manual 62.8 40.0 - 80.0 %    Bands %, Manual 0.9 0.0 - 5.0 %    Lymphocytes %, Manual 15.1 13.0 - 44.0 %    Monocytes %, Manual 16.8 2.0 - 10.0 %    Eosinophils %, Manual 3.5 0.0 - 6.0 %    Basophils %, Manual 0.9 0.0 - 2.0 %    Seg Neutrophils Absolute, Manual 9.29 (H) 1.20 - 7.00 x10*3/uL    Bands Absolute, Manual 0.13 0.00 - 0.70 x10*3/uL    Lymphocytes Absolute, Manual 2.23 1.20 - 4.80 x10*3/uL    Monocytes Absolute, Manual 2.49 (H) 0.10 - 1.00 x10*3/uL    Eosinophils Absolute, Manual 0.52 0.00 - 0.70  x10*3/uL    Basophils Absolute, Manual 0.13 (H) 0.00 - 0.10 x10*3/uL    Total Cells Counted 113     Neutrophils Absolute, Manual 9.42 (H) 1.20 - 7.70 x10*3/uL    RBC Morphology See Below     Polychromasia Mild     Hypochromia Mild     Sickle Cells Many     Target Cells Few     Ovalocytes Few     [place] Cells Few     [place] Bodies Present     Pappenheimer Bodies Present            Medication instructions and NPO guidelines reviewed with the patient.  All questions or concerns discussed and addressed.      Lindsay Vaca PA-C

## 2025-01-17 LAB — STAPHYLOCOCCUS SPEC CULT: NORMAL

## 2025-01-19 ENCOUNTER — HOSPITAL ENCOUNTER (INPATIENT)
Facility: HOSPITAL | Age: 25
DRG: 812 | End: 2025-01-19
Attending: EMERGENCY MEDICINE | Admitting: NURSE PRACTITIONER
Payer: COMMERCIAL

## 2025-01-19 ENCOUNTER — APPOINTMENT (OUTPATIENT)
Dept: RADIOLOGY | Facility: HOSPITAL | Age: 25
DRG: 812 | End: 2025-01-19
Payer: COMMERCIAL

## 2025-01-19 ENCOUNTER — CLINICAL SUPPORT (OUTPATIENT)
Dept: EMERGENCY MEDICINE | Facility: HOSPITAL | Age: 25
DRG: 812 | End: 2025-01-19
Payer: COMMERCIAL

## 2025-01-19 VITALS
OXYGEN SATURATION: 96 % | BODY MASS INDEX: 20.53 KG/M2 | DIASTOLIC BLOOD PRESSURE: 82 MMHG | HEART RATE: 89 BPM | HEIGHT: 74 IN | TEMPERATURE: 98.4 F | SYSTOLIC BLOOD PRESSURE: 125 MMHG | RESPIRATION RATE: 16 BRPM | WEIGHT: 160 LBS

## 2025-01-19 DIAGNOSIS — D57.09 SICKLE CELL DISEASE WITH CRISIS AND OTHER COMPLICATION: ICD-10-CM

## 2025-01-19 DIAGNOSIS — D57.00 SICKLE CELL PAIN CRISIS (MULTI): Primary | ICD-10-CM

## 2025-01-19 DIAGNOSIS — M79.602 PAIN IN LEFT ARM: ICD-10-CM

## 2025-01-19 DIAGNOSIS — D57.1 SICKLE-CELL DISEASE WITHOUT CRISIS (MULTI): ICD-10-CM

## 2025-01-19 LAB
ABO GROUP (TYPE) IN BLOOD: NORMAL
ACANTHOCYTES BLD QL SMEAR: NORMAL
ALBUMIN SERPL BCP-MCNC: 4.2 G/DL (ref 3.4–5)
ALBUMIN SERPL BCP-MCNC: 4.4 G/DL (ref 3.4–5)
ALP SERPL-CCNC: 132 U/L (ref 33–120)
ALP SERPL-CCNC: 148 U/L (ref 33–120)
ALT SERPL W P-5'-P-CCNC: 18 U/L (ref 10–52)
ALT SERPL W P-5'-P-CCNC: 23 U/L (ref 10–52)
AMPHETAMINES UR QL SCN: ABNORMAL
ANION GAP SERPL CALC-SCNC: 12 MMOL/L (ref 10–20)
ANION GAP SERPL CALC-SCNC: 8 MMOL/L (ref 10–20)
ANTIBODY SCREEN: NORMAL
AST SERPL W P-5'-P-CCNC: 33 U/L (ref 9–39)
AST SERPL W P-5'-P-CCNC: 37 U/L (ref 9–39)
BARBITURATES UR QL SCN: ABNORMAL
BASOPHILS # BLD AUTO: 0.08 X10*3/UL (ref 0–0.1)
BASOPHILS # BLD AUTO: 0.1 X10*3/UL (ref 0–0.1)
BASOPHILS NFR BLD AUTO: 0.6 %
BASOPHILS NFR BLD AUTO: 0.6 %
BILIRUB SERPL-MCNC: 7.1 MG/DL (ref 0–1.2)
BILIRUB SERPL-MCNC: 7.2 MG/DL (ref 0–1.2)
BUN SERPL-MCNC: 7 MG/DL (ref 6–23)
BUN SERPL-MCNC: 7 MG/DL (ref 6–23)
BZE UR QL SCN: ABNORMAL
CA-I BLD-SCNC: 1.17 MMOL/L (ref 1.1–1.33)
CALCIUM SERPL-MCNC: 9.1 MG/DL (ref 8.6–10.6)
CALCIUM SERPL-MCNC: 9.4 MG/DL (ref 8.6–10.6)
CANNABINOIDS UR QL SCN: ABNORMAL
CHLORIDE SERPL-SCNC: 108 MMOL/L (ref 98–107)
CHLORIDE SERPL-SCNC: 109 MMOL/L (ref 98–107)
CO2 SERPL-SCNC: 24 MMOL/L (ref 21–32)
CO2 SERPL-SCNC: 26 MMOL/L (ref 21–32)
CREAT SERPL-MCNC: 0.54 MG/DL (ref 0.5–1.3)
CREAT SERPL-MCNC: 0.67 MG/DL (ref 0.5–1.3)
CREAT UR-MCNC: 133.8 MG/DL (ref 20–370)
DACRYOCYTES BLD QL SMEAR: NORMAL
EGFRCR SERPLBLD CKD-EPI 2021: >90 ML/MIN/1.73M*2
EGFRCR SERPLBLD CKD-EPI 2021: >90 ML/MIN/1.73M*2
EOSINOPHIL # BLD AUTO: 0.31 X10*3/UL (ref 0–0.7)
EOSINOPHIL # BLD AUTO: 0.35 X10*3/UL (ref 0–0.7)
EOSINOPHIL NFR BLD AUTO: 1.9 %
EOSINOPHIL NFR BLD AUTO: 2.4 %
ERYTHROCYTE [DISTWIDTH] IN BLOOD BY AUTOMATED COUNT: 22.7 % (ref 11.5–14.5)
ERYTHROCYTE [DISTWIDTH] IN BLOOD BY AUTOMATED COUNT: 23 % (ref 11.5–14.5)
FLUAV RNA RESP QL NAA+PROBE: NOT DETECTED
FLUBV RNA RESP QL NAA+PROBE: NOT DETECTED
GLUCOSE SERPL-MCNC: 90 MG/DL (ref 74–99)
GLUCOSE SERPL-MCNC: 95 MG/DL (ref 74–99)
HCT VFR BLD AUTO: 27.4 % (ref 41–52)
HCT VFR BLD AUTO: 29.7 % (ref 41–52)
HGB BLD-MCNC: 10.1 G/DL (ref 13.5–17.5)
HGB BLD-MCNC: 10.7 G/DL (ref 13.5–17.5)
HGB RETIC QN: 29 PG (ref 28–38)
HGB RETIC QN: 30 PG (ref 28–38)
HOWELL-JOLLY BOD BLD QL SMEAR: PRESENT
HYPOCHROMIA BLD QL SMEAR: NORMAL
IMM GRANULOCYTES # BLD AUTO: 0.3 X10*3/UL (ref 0–0.7)
IMM GRANULOCYTES # BLD AUTO: 0.53 X10*3/UL (ref 0–0.7)
IMM GRANULOCYTES NFR BLD AUTO: 1.8 % (ref 0–0.9)
IMM GRANULOCYTES NFR BLD AUTO: 3.7 % (ref 0–0.9)
IMMATURE RETIC FRACTION: 21.4 %
IMMATURE RETIC FRACTION: 24 %
LDH SERPL L TO P-CCNC: 287 U/L (ref 84–246)
LDH SERPL L TO P-CCNC: 290 U/L (ref 84–246)
LYMPHOCYTES # BLD AUTO: 1.69 X10*3/UL (ref 1.2–4.8)
LYMPHOCYTES # BLD AUTO: 2.69 X10*3/UL (ref 1.2–4.8)
LYMPHOCYTES NFR BLD AUTO: 11.8 %
LYMPHOCYTES NFR BLD AUTO: 16.3 %
MCH RBC QN AUTO: 29.4 PG (ref 26–34)
MCH RBC QN AUTO: 29.5 PG (ref 26–34)
MCHC RBC AUTO-ENTMCNC: 36 G/DL (ref 32–36)
MCHC RBC AUTO-ENTMCNC: 36.9 G/DL (ref 32–36)
MCV RBC AUTO: 80 FL (ref 80–100)
MCV RBC AUTO: 82 FL (ref 80–100)
MONOCYTES # BLD AUTO: 1.46 X10*3/UL (ref 0.1–1)
MONOCYTES # BLD AUTO: 1.5 X10*3/UL (ref 0.1–1)
MONOCYTES NFR BLD AUTO: 10.5 %
MONOCYTES NFR BLD AUTO: 8.9 %
NEUTROPHILS # BLD AUTO: 10.17 X10*3/UL (ref 1.2–7.7)
NEUTROPHILS # BLD AUTO: 11.61 X10*3/UL (ref 1.2–7.7)
NEUTROPHILS NFR BLD AUTO: 70.5 %
NEUTROPHILS NFR BLD AUTO: 71 %
NRBC BLD-RTO: 1.9 /100 WBCS (ref 0–0)
NRBC BLD-RTO: 2.1 /100 WBCS (ref 0–0)
PAPPENHEIMER BOD BLD QL SMEAR: PRESENT
PAPPENHEIMER BOD BLD QL SMEAR: PRESENT
PCP UR QL SCN: ABNORMAL
PLATELET # BLD AUTO: 287 X10*3/UL (ref 150–450)
PLATELET # BLD AUTO: 291 X10*3/UL (ref 150–450)
POLYCHROMASIA BLD QL SMEAR: NORMAL
POLYCHROMASIA BLD QL SMEAR: NORMAL
POTASSIUM SERPL-SCNC: 3.5 MMOL/L (ref 3.5–5.3)
POTASSIUM SERPL-SCNC: 3.8 MMOL/L (ref 3.5–5.3)
PROT SERPL-MCNC: 6.6 G/DL (ref 6.4–8.2)
PROT SERPL-MCNC: 6.9 G/DL (ref 6.4–8.2)
RBC # BLD AUTO: 3.42 X10*6/UL (ref 4.5–5.9)
RBC # BLD AUTO: 3.64 X10*6/UL (ref 4.5–5.9)
RBC MORPH BLD: NORMAL
RBC MORPH BLD: NORMAL
RETICS #: 0.68 X10*6/UL (ref 0.02–0.12)
RETICS #: 0.69 X10*6/UL (ref 0.02–0.12)
RETICS/RBC NFR AUTO: 18.7 % (ref 0.5–2)
RETICS/RBC NFR AUTO: 20.1 % (ref 0.5–2)
RH FACTOR (ANTIGEN D): NORMAL
SARS-COV-2 RNA RESP QL NAA+PROBE: NOT DETECTED
SICKLE CELLS BLD QL SMEAR: NORMAL
SICKLE CELLS BLD QL SMEAR: NORMAL
SODIUM SERPL-SCNC: 139 MMOL/L (ref 136–145)
SODIUM SERPL-SCNC: 140 MMOL/L (ref 136–145)
TARGETS BLD QL SMEAR: NORMAL
TARGETS BLD QL SMEAR: NORMAL
WBC # BLD AUTO: 14.3 X10*3/UL (ref 4.4–11.3)
WBC # BLD AUTO: 16.5 X10*3/UL (ref 4.4–11.3)

## 2025-01-19 PROCEDURE — 80307 DRUG TEST PRSMV CHEM ANLYZR: CPT | Performed by: NURSE PRACTITIONER

## 2025-01-19 PROCEDURE — 2500000004 HC RX 250 GENERAL PHARMACY W/ HCPCS (ALT 636 FOR OP/ED): Performed by: PHYSICIAN ASSISTANT

## 2025-01-19 PROCEDURE — 36415 COLL VENOUS BLD VENIPUNCTURE: CPT | Performed by: EMERGENCY MEDICINE

## 2025-01-19 PROCEDURE — 93010 ELECTROCARDIOGRAM REPORT: CPT | Performed by: EMERGENCY MEDICINE

## 2025-01-19 PROCEDURE — 2500000004 HC RX 250 GENERAL PHARMACY W/ HCPCS (ALT 636 FOR OP/ED): Performed by: NURSE PRACTITIONER

## 2025-01-19 PROCEDURE — 85025 COMPLETE CBC W/AUTO DIFF WBC: CPT | Performed by: EMERGENCY MEDICINE

## 2025-01-19 PROCEDURE — 99285 EMERGENCY DEPT VISIT HI MDM: CPT | Performed by: EMERGENCY MEDICINE

## 2025-01-19 PROCEDURE — 36415 COLL VENOUS BLD VENIPUNCTURE: CPT | Performed by: NURSE PRACTITIONER

## 2025-01-19 PROCEDURE — 80053 COMPREHEN METABOLIC PANEL: CPT | Performed by: EMERGENCY MEDICINE

## 2025-01-19 PROCEDURE — 87636 SARSCOV2 & INF A&B AMP PRB: CPT | Performed by: EMERGENCY MEDICINE

## 2025-01-19 PROCEDURE — 2500000005 HC RX 250 GENERAL PHARMACY W/O HCPCS: Performed by: NURSE PRACTITIONER

## 2025-01-19 PROCEDURE — 83021 HEMOGLOBIN CHROMOTOGRAPHY: CPT | Performed by: NURSE PRACTITIONER

## 2025-01-19 PROCEDURE — 96375 TX/PRO/DX INJ NEW DRUG ADDON: CPT

## 2025-01-19 PROCEDURE — 85025 COMPLETE CBC W/AUTO DIFF WBC: CPT | Performed by: NURSE PRACTITIONER

## 2025-01-19 PROCEDURE — 2500000004 HC RX 250 GENERAL PHARMACY W/ HCPCS (ALT 636 FOR OP/ED): Performed by: STUDENT IN AN ORGANIZED HEALTH CARE EDUCATION/TRAINING PROGRAM

## 2025-01-19 PROCEDURE — 2500000005 HC RX 250 GENERAL PHARMACY W/O HCPCS

## 2025-01-19 PROCEDURE — 83615 LACTATE (LD) (LDH) ENZYME: CPT | Performed by: NURSE PRACTITIONER

## 2025-01-19 PROCEDURE — 86901 BLOOD TYPING SEROLOGIC RH(D): CPT | Performed by: NURSE PRACTITIONER

## 2025-01-19 PROCEDURE — 2500000001 HC RX 250 WO HCPCS SELF ADMINISTERED DRUGS (ALT 637 FOR MEDICARE OP)

## 2025-01-19 PROCEDURE — 96361 HYDRATE IV INFUSION ADD-ON: CPT

## 2025-01-19 PROCEDURE — 83615 LACTATE (LD) (LDH) ENZYME: CPT | Performed by: EMERGENCY MEDICINE

## 2025-01-19 PROCEDURE — 80053 COMPREHEN METABOLIC PANEL: CPT | Performed by: NURSE PRACTITIONER

## 2025-01-19 PROCEDURE — 2500000004 HC RX 250 GENERAL PHARMACY W/ HCPCS (ALT 636 FOR OP/ED)

## 2025-01-19 PROCEDURE — 80373 DRUG SCREENING TRAMADOL: CPT | Performed by: NURSE PRACTITIONER

## 2025-01-19 PROCEDURE — 99285 EMERGENCY DEPT VISIT HI MDM: CPT | Mod: 25 | Performed by: EMERGENCY MEDICINE

## 2025-01-19 PROCEDURE — 85045 AUTOMATED RETICULOCYTE COUNT: CPT | Performed by: NURSE PRACTITIONER

## 2025-01-19 PROCEDURE — 1170000001 HC PRIVATE ONCOLOGY ROOM DAILY

## 2025-01-19 PROCEDURE — 71046 X-RAY EXAM CHEST 2 VIEWS: CPT | Performed by: RADIOLOGY

## 2025-01-19 PROCEDURE — 2500000001 HC RX 250 WO HCPCS SELF ADMINISTERED DRUGS (ALT 637 FOR MEDICARE OP): Performed by: NURSE PRACTITIONER

## 2025-01-19 PROCEDURE — 96374 THER/PROPH/DIAG INJ IV PUSH: CPT | Mod: 59

## 2025-01-19 PROCEDURE — 96376 TX/PRO/DX INJ SAME DRUG ADON: CPT

## 2025-01-19 PROCEDURE — 85045 AUTOMATED RETICULOCYTE COUNT: CPT | Performed by: EMERGENCY MEDICINE

## 2025-01-19 PROCEDURE — 93005 ELECTROCARDIOGRAM TRACING: CPT

## 2025-01-19 PROCEDURE — 82330 ASSAY OF CALCIUM: CPT | Performed by: NURSE PRACTITIONER

## 2025-01-19 PROCEDURE — 71046 X-RAY EXAM CHEST 2 VIEWS: CPT

## 2025-01-19 PROCEDURE — 80349 CANNABINOIDS NATURAL: CPT | Performed by: NURSE PRACTITIONER

## 2025-01-19 RX ORDER — KETOROLAC TROMETHAMINE 15 MG/ML
15 INJECTION, SOLUTION INTRAMUSCULAR; INTRAVENOUS ONCE
Status: COMPLETED | OUTPATIENT
Start: 2025-01-19 | End: 2025-01-19

## 2025-01-19 RX ORDER — HYDROMORPHONE HYDROCHLORIDE 1 MG/ML
3 INJECTION, SOLUTION INTRAMUSCULAR; INTRAVENOUS; SUBCUTANEOUS ONCE
Status: COMPLETED | OUTPATIENT
Start: 2025-01-19 | End: 2025-01-19

## 2025-01-19 RX ORDER — ENOXAPARIN SODIUM 100 MG/ML
40 INJECTION SUBCUTANEOUS EVERY 24 HOURS
Status: DISCONTINUED | OUTPATIENT
Start: 2025-01-19 | End: 2025-01-28 | Stop reason: HOSPADM

## 2025-01-19 RX ORDER — FOLIC ACID 1 MG/1
1 TABLET ORAL DAILY
Status: DISCONTINUED | OUTPATIENT
Start: 2025-01-19 | End: 2025-01-28 | Stop reason: HOSPADM

## 2025-01-19 RX ORDER — HYDROMORPHONE HYDROCHLORIDE 2 MG/ML
2 INJECTION, SOLUTION INTRAMUSCULAR; INTRAVENOUS; SUBCUTANEOUS EVERY 2 HOUR PRN
Status: DISCONTINUED | OUTPATIENT
Start: 2025-01-20 | End: 2025-01-20

## 2025-01-19 RX ORDER — METHOCARBAMOL 500 MG/1
500 TABLET, FILM COATED ORAL ONCE
Status: COMPLETED | OUTPATIENT
Start: 2025-01-19 | End: 2025-01-19

## 2025-01-19 RX ORDER — HYDROMORPHONE HYDROCHLORIDE 2 MG/ML
2 INJECTION, SOLUTION INTRAMUSCULAR; INTRAVENOUS; SUBCUTANEOUS EVERY 2 HOUR PRN
Status: DISCONTINUED | OUTPATIENT
Start: 2025-01-19 | End: 2025-01-19

## 2025-01-19 RX ORDER — DIPHENHYDRAMINE HCL 25 MG
25 CAPSULE ORAL EVERY 6 HOURS PRN
Status: DISCONTINUED | OUTPATIENT
Start: 2025-01-19 | End: 2025-01-28 | Stop reason: HOSPADM

## 2025-01-19 RX ORDER — KETOROLAC TROMETHAMINE 15 MG/ML
15 INJECTION, SOLUTION INTRAMUSCULAR; INTRAVENOUS EVERY 8 HOURS SCHEDULED
Status: COMPLETED | OUTPATIENT
Start: 2025-01-19 | End: 2025-01-22

## 2025-01-19 RX ORDER — AMOXICILLIN 250 MG
2 CAPSULE ORAL 2 TIMES DAILY
Status: DISCONTINUED | OUTPATIENT
Start: 2025-01-19 | End: 2025-01-28 | Stop reason: HOSPADM

## 2025-01-19 RX ORDER — HYDROMORPHONE HYDROCHLORIDE 1 MG/ML
1 INJECTION, SOLUTION INTRAMUSCULAR; INTRAVENOUS; SUBCUTANEOUS ONCE
Status: COMPLETED | OUTPATIENT
Start: 2025-01-19 | End: 2025-01-19

## 2025-01-19 RX ORDER — MORPHINE SULFATE 4 MG/ML
8 INJECTION INTRAVENOUS
Status: DISCONTINUED | OUTPATIENT
Start: 2025-01-19 | End: 2025-01-19

## 2025-01-19 RX ORDER — MORPHINE SULFATE 4 MG/ML
8 INJECTION INTRAVENOUS EVERY 2 HOUR PRN
Status: DISCONTINUED | OUTPATIENT
Start: 2025-01-19 | End: 2025-01-19

## 2025-01-19 RX ORDER — HYDROMORPHONE HYDROCHLORIDE 1 MG/ML
2 INJECTION, SOLUTION INTRAMUSCULAR; INTRAVENOUS; SUBCUTANEOUS EVERY 2 HOUR PRN
Status: DISCONTINUED | OUTPATIENT
Start: 2025-01-19 | End: 2025-01-19

## 2025-01-19 RX ORDER — PSEUDOEPHEDRINE HYDROCHLORIDE 60 MG/1
60 TABLET ORAL EVERY 8 HOURS PRN
Status: DISCONTINUED | OUTPATIENT
Start: 2025-01-19 | End: 2025-01-28 | Stop reason: HOSPADM

## 2025-01-19 RX ORDER — ONDANSETRON HYDROCHLORIDE 8 MG/1
8 TABLET, FILM COATED ORAL EVERY 8 HOURS PRN
Status: DISCONTINUED | OUTPATIENT
Start: 2025-01-19 | End: 2025-01-28 | Stop reason: HOSPADM

## 2025-01-19 RX ORDER — HYDROMORPHONE HYDROCHLORIDE 1 MG/ML
3 INJECTION, SOLUTION INTRAMUSCULAR; INTRAVENOUS; SUBCUTANEOUS EVERY 2 HOUR PRN
Status: DISCONTINUED | OUTPATIENT
Start: 2025-01-19 | End: 2025-01-20

## 2025-01-19 RX ORDER — POLYETHYLENE GLYCOL 3350 17 G/17G
17 POWDER, FOR SOLUTION ORAL DAILY
Status: DISCONTINUED | OUTPATIENT
Start: 2025-01-19 | End: 2025-01-28 | Stop reason: HOSPADM

## 2025-01-19 RX ORDER — HYDROMORPHONE HYDROCHLORIDE 2 MG/ML
2 INJECTION, SOLUTION INTRAMUSCULAR; INTRAVENOUS; SUBCUTANEOUS ONCE
Status: COMPLETED | OUTPATIENT
Start: 2025-01-20 | End: 2025-01-19

## 2025-01-19 RX ORDER — HYDROMORPHONE HYDROCHLORIDE 1 MG/ML
1 INJECTION, SOLUTION INTRAMUSCULAR; INTRAVENOUS; SUBCUTANEOUS EVERY 2 HOUR PRN
Status: DISCONTINUED | OUTPATIENT
Start: 2025-01-19 | End: 2025-01-19

## 2025-01-19 RX ORDER — PANTOPRAZOLE SODIUM 40 MG/1
40 TABLET, DELAYED RELEASE ORAL
Status: DISCONTINUED | OUTPATIENT
Start: 2025-01-20 | End: 2025-01-28 | Stop reason: HOSPADM

## 2025-01-19 RX ORDER — HYDROMORPHONE HYDROCHLORIDE 1 MG/ML
0.5 INJECTION, SOLUTION INTRAMUSCULAR; INTRAVENOUS; SUBCUTANEOUS ONCE
Status: DISCONTINUED | OUTPATIENT
Start: 2025-01-19 | End: 2025-01-19

## 2025-01-19 RX ORDER — ACETAMINOPHEN 325 MG/1
975 TABLET ORAL ONCE
Status: COMPLETED | OUTPATIENT
Start: 2025-01-19 | End: 2025-01-19

## 2025-01-19 RX ADMIN — SODIUM CHLORIDE, POTASSIUM CHLORIDE, SODIUM LACTATE AND CALCIUM CHLORIDE 1000 ML: 600; 310; 30; 20 INJECTION, SOLUTION INTRAVENOUS at 09:27

## 2025-01-19 RX ADMIN — ONDANSETRON HYDROCHLORIDE 8 MG: 8 TABLET, FILM COATED ORAL at 21:23

## 2025-01-19 RX ADMIN — HYDROMORPHONE HYDROCHLORIDE 3 MG: 1 INJECTION, SOLUTION INTRAMUSCULAR; INTRAVENOUS; SUBCUTANEOUS at 15:33

## 2025-01-19 RX ADMIN — KETOROLAC TROMETHAMINE 15 MG: 15 INJECTION, SOLUTION INTRAMUSCULAR; INTRAVENOUS at 17:39

## 2025-01-19 RX ADMIN — FOLIC ACID 1 MG: 1 TABLET ORAL at 13:31

## 2025-01-19 RX ADMIN — MORPHINE SULFATE 8 MG: 4 INJECTION INTRAVENOUS at 10:18

## 2025-01-19 RX ADMIN — HYDROMORPHONE HYDROCHLORIDE 3 MG: 1 INJECTION, SOLUTION INTRAMUSCULAR; INTRAVENOUS; SUBCUTANEOUS at 17:39

## 2025-01-19 RX ADMIN — MORPHINE SULFATE 8 MG: 4 INJECTION INTRAVENOUS at 08:46

## 2025-01-19 RX ADMIN — HYDROMORPHONE HYDROCHLORIDE 3 MG: 1 INJECTION, SOLUTION INTRAMUSCULAR; INTRAVENOUS; SUBCUTANEOUS at 22:28

## 2025-01-19 RX ADMIN — Medication 21 PERCENT: at 21:27

## 2025-01-19 RX ADMIN — KETOROLAC TROMETHAMINE 15 MG: 15 INJECTION, SOLUTION INTRAMUSCULAR; INTRAVENOUS at 09:27

## 2025-01-19 RX ADMIN — HYDROMORPHONE HYDROCHLORIDE 1 MG: 1 INJECTION, SOLUTION INTRAMUSCULAR; INTRAVENOUS; SUBCUTANEOUS at 12:24

## 2025-01-19 RX ADMIN — HYDROMORPHONE HYDROCHLORIDE 3 MG: 1 INJECTION, SOLUTION INTRAMUSCULAR; INTRAVENOUS; SUBCUTANEOUS at 20:00

## 2025-01-19 RX ADMIN — ACETAMINOPHEN 975 MG: 325 TABLET ORAL at 09:27

## 2025-01-19 RX ADMIN — Medication 2 L/MIN: at 09:36

## 2025-01-19 RX ADMIN — METHOCARBAMOL 500 MG: 500 TABLET ORAL at 08:46

## 2025-01-19 RX ADMIN — HYDROMORPHONE HYDROCHLORIDE 3 MG: 2 INJECTION, SOLUTION INTRAMUSCULAR; INTRAVENOUS; SUBCUTANEOUS at 21:22

## 2025-01-19 RX ADMIN — MORPHINE SULFATE 8 MG: 4 INJECTION INTRAVENOUS at 11:18

## 2025-01-19 RX ADMIN — HYDROMORPHONE HYDROCHLORIDE 3 MG: 1 INJECTION, SOLUTION INTRAMUSCULAR; INTRAVENOUS; SUBCUTANEOUS at 13:31

## 2025-01-19 RX ADMIN — HYDROMORPHONE HYDROCHLORIDE 2 MG: 2 INJECTION INTRAMUSCULAR; INTRAVENOUS; SUBCUTANEOUS at 23:44

## 2025-01-19 RX ADMIN — ENOXAPARIN SODIUM 40 MG: 40 INJECTION SUBCUTANEOUS at 21:23

## 2025-01-19 SDOH — ECONOMIC STABILITY: INCOME INSECURITY: IN THE PAST 12 MONTHS HAS THE ELECTRIC, GAS, OIL, OR WATER COMPANY THREATENED TO SHUT OFF SERVICES IN YOUR HOME?: NO

## 2025-01-19 SDOH — SOCIAL STABILITY: SOCIAL INSECURITY: DO YOU FEEL ANYONE HAS EXPLOITED OR TAKEN ADVANTAGE OF YOU FINANCIALLY OR OF YOUR PERSONAL PROPERTY?: NO

## 2025-01-19 SDOH — SOCIAL STABILITY: SOCIAL INSECURITY: HAS ANYONE EVER THREATENED TO HURT YOUR FAMILY OR YOUR PETS?: NO

## 2025-01-19 SDOH — SOCIAL STABILITY: SOCIAL INSECURITY: HAVE YOU HAD ANY THOUGHTS OF HARMING ANYONE ELSE?: NO

## 2025-01-19 SDOH — ECONOMIC STABILITY: HOUSING INSECURITY: IN THE PAST 12 MONTHS, HOW MANY TIMES HAVE YOU MOVED WHERE YOU WERE LIVING?: 0

## 2025-01-19 SDOH — ECONOMIC STABILITY: FOOD INSECURITY: WITHIN THE PAST 12 MONTHS, YOU WORRIED THAT YOUR FOOD WOULD RUN OUT BEFORE YOU GOT THE MONEY TO BUY MORE.: NEVER TRUE

## 2025-01-19 SDOH — ECONOMIC STABILITY: HOUSING INSECURITY: AT ANY TIME IN THE PAST 12 MONTHS, WERE YOU HOMELESS OR LIVING IN A SHELTER (INCLUDING NOW)?: NO

## 2025-01-19 SDOH — SOCIAL STABILITY: SOCIAL INSECURITY: HAVE YOU HAD THOUGHTS OF HARMING ANYONE ELSE?: NO

## 2025-01-19 SDOH — SOCIAL STABILITY: SOCIAL INSECURITY: ARE THERE ANY APPARENT SIGNS OF INJURIES/BEHAVIORS THAT COULD BE RELATED TO ABUSE/NEGLECT?: NO

## 2025-01-19 SDOH — SOCIAL STABILITY: SOCIAL INSECURITY: WERE YOU ABLE TO COMPLETE ALL THE BEHAVIORAL HEALTH SCREENINGS?: YES

## 2025-01-19 SDOH — ECONOMIC STABILITY: HOUSING INSECURITY: IN THE LAST 12 MONTHS, WAS THERE A TIME WHEN YOU WERE NOT ABLE TO PAY THE MORTGAGE OR RENT ON TIME?: NO

## 2025-01-19 SDOH — ECONOMIC STABILITY: FOOD INSECURITY: WITHIN THE PAST 12 MONTHS, THE FOOD YOU BOUGHT JUST DIDN'T LAST AND YOU DIDN'T HAVE MONEY TO GET MORE.: NEVER TRUE

## 2025-01-19 SDOH — ECONOMIC STABILITY: TRANSPORTATION INSECURITY: IN THE PAST 12 MONTHS, HAS LACK OF TRANSPORTATION KEPT YOU FROM MEDICAL APPOINTMENTS OR FROM GETTING MEDICATIONS?: NO

## 2025-01-19 SDOH — SOCIAL STABILITY: SOCIAL INSECURITY: DO YOU FEEL UNSAFE GOING BACK TO THE PLACE WHERE YOU ARE LIVING?: NO

## 2025-01-19 SDOH — SOCIAL STABILITY: SOCIAL INSECURITY: WITHIN THE LAST YEAR, HAVE YOU BEEN HUMILIATED OR EMOTIONALLY ABUSED IN OTHER WAYS BY YOUR PARTNER OR EX-PARTNER?: NO

## 2025-01-19 SDOH — SOCIAL STABILITY: SOCIAL INSECURITY: ARE YOU OR HAVE YOU BEEN THREATENED OR ABUSED PHYSICALLY, EMOTIONALLY, OR SEXUALLY BY ANYONE?: NO

## 2025-01-19 SDOH — SOCIAL STABILITY: SOCIAL INSECURITY: WITHIN THE LAST YEAR, HAVE YOU BEEN AFRAID OF YOUR PARTNER OR EX-PARTNER?: NO

## 2025-01-19 SDOH — SOCIAL STABILITY: SOCIAL INSECURITY: DOES ANYONE TRY TO KEEP YOU FROM HAVING/CONTACTING OTHER FRIENDS OR DOING THINGS OUTSIDE YOUR HOME?: NO

## 2025-01-19 SDOH — SOCIAL STABILITY: SOCIAL INSECURITY: ABUSE: ADULT

## 2025-01-19 SDOH — ECONOMIC STABILITY: FOOD INSECURITY: HOW HARD IS IT FOR YOU TO PAY FOR THE VERY BASICS LIKE FOOD, HOUSING, MEDICAL CARE, AND HEATING?: NOT HARD AT ALL

## 2025-01-19 ASSESSMENT — PAIN - FUNCTIONAL ASSESSMENT
PAIN_FUNCTIONAL_ASSESSMENT: 0-10
PAIN_FUNCTIONAL_ASSESSMENT: 0-10

## 2025-01-19 ASSESSMENT — ACTIVITIES OF DAILY LIVING (ADL)
JUDGMENT_ADEQUATE_SAFELY_COMPLETE_DAILY_ACTIVITIES: YES
LACK_OF_TRANSPORTATION: NO
HEARING - LEFT EAR: FUNCTIONAL
FEEDING YOURSELF: INDEPENDENT
HEARING - RIGHT EAR: FUNCTIONAL
DRESSING YOURSELF: INDEPENDENT
TOILETING: INDEPENDENT
BATHING: INDEPENDENT
PATIENT'S MEMORY ADEQUATE TO SAFELY COMPLETE DAILY ACTIVITIES?: YES
ADEQUATE_TO_COMPLETE_ADL: YES
GROOMING: INDEPENDENT
WALKS IN HOME: INDEPENDENT

## 2025-01-19 ASSESSMENT — COGNITIVE AND FUNCTIONAL STATUS - GENERAL
MOBILITY SCORE: 24
DAILY ACTIVITIY SCORE: 24
PATIENT BASELINE BEDBOUND: NO

## 2025-01-19 ASSESSMENT — PAIN SCALES - GENERAL
PAINLEVEL_OUTOF10: 10 - WORST POSSIBLE PAIN
PAINLEVEL_OUTOF10: 8
PAINLEVEL_OUTOF10: 10 - WORST POSSIBLE PAIN

## 2025-01-19 ASSESSMENT — PAIN DESCRIPTION - LOCATION: LOCATION: ARM

## 2025-01-19 ASSESSMENT — PAIN DESCRIPTION - ORIENTATION: ORIENTATION: LEFT

## 2025-01-19 NOTE — ED PROVIDER NOTES
Emergency Department Provider Note        History of Present Illness     History provided by: Patient  Limitations to History: None  External Records Reviewed with Brief Summary:  Prior hospitalization for sickle cell pain    HPI:  Joellen Narayan is a 24 y.o. 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  Started several hours prior to ED arrival in his bilateral arms, bilateral legs.  No chest pain or abdominal pain.  He reports that this pain is his typical sickle cell pain.  No recent illnesses.  No shortness of breath.    Physical Exam   Triage vitals:  T 36.8 °C (98.2 °F)  HR 92  /85  RR 16  O2 (!) 93 % Supplemental oxygen (2L)    Physical Exam  Vitals and nursing note reviewed.   Constitutional:       Appearance: He is well-developed.      Comments: Appears very uncomfortable and in pain.   HENT:      Head: Normocephalic and atraumatic.      Right Ear: External ear normal.      Left Ear: External ear normal.      Nose: Nose normal.   Eyes:      General: No scleral icterus.     Conjunctiva/sclera: Conjunctivae normal.      Pupils: Pupils are equal, round, and reactive to light.   Cardiovascular:      Rate and Rhythm: Normal rate and regular rhythm.      Heart sounds: No murmur heard.  Pulmonary:      Effort: Pulmonary effort is normal. No respiratory distress.      Breath sounds: Normal breath sounds.   Abdominal:      Palpations: Abdomen is soft.      Tenderness: There is no abdominal tenderness.   Musculoskeletal:         General: No swelling.      Cervical back: Neck supple. No rigidity.   Skin:      General: Skin is warm and dry.      Capillary Refill: Capillary refill takes less than 2 seconds.   Neurological:      General: No focal deficit present.      Mental Status: He is alert.   Psychiatric:         Mood and Affect: Mood normal.          Medical Decision Making & ED Course   Medical Decision Makin y.o. male presenting for sickle cell pain.  He reports that his pain is his typical pain his arms and legs.  No chest pain, shortness breath, fevers, or other sick symptoms.  He reports that morphine has helped in the past, which he had noted to the ED TMI his previous visits after had tried Dilaudid.  Thus, patient is given morphine 8 mg IV.  He showed mild hypoxia 93% on room air, but otherwise hemodynamically stable.  Wanting chest x-ray and lab work.  Will also administer oxygen, IV fluids for his sickle cell crisis.  Patient did not have improvement with the morphine.  Given Tylenol, Toradol.  Given his morphine dose slightly early.  After 2 doses of morphine, Tylenol, Toradol, IV fluids, he is still significant pain.  Discussed trying other pain medications, and he would like to try Dilaudid again.  Patient given Dilaudid 1 mg IV.    CBC shows leukocytosis of 14.3.  However, chest x-ray is negative for pneumonia and he does not have any sick symptoms.  Likely reactive in nature.  Hemoglobin 10.7 is similar to prior lab work.  Chemistry panel unremarkable.  COVID, influenza negative.  LDH mildly elevated at 287, slightly decreased from prior lab work.  Reticulocyte count is increased from baseline at 18.7.    Patient reevaluate multiple times and after 3 doses of IV pain medications, he is still reporting significant pain.  He does not feel comfortable going home.  Discussed this with the admissions coordinator and patient admitted to the hematology service for further management of his sickle cell pain.  Patient agreeable with this plan.    Santy Rogel DO, PGY 4  Emergency Medicine Resident  ----      Social Determinants of Health which Significantly Impact Care: None identified     EKG Independent Interpretation: EKG interpreted by myself. Please see ED Course for full interpretation.    Independent Result Review and Interpretation: Relevant laboratory and radiographic results were reviewed and independently interpreted by myself.  As necessary, they are commented on in the ED Course.    Chronic conditions affecting the patient's care: As documented above in [facility]    The patient was discussed with the following consultants/services: Admissions coordinator    Care Considerations: As documented above in [facility]    ED Course:  ED Course as of 01/19/25 2147   Sun Jan 19, 2025   0805 ATTENDING ATTESTATION  Bruce 24-year-old male with a history of sickle cell disease presenting to the emergency department self-described acute on chronic vaso-occlusive crisis type pain.  Pain is in the extremities, typically precipitated by cold onset over the past day.  Patient appears obviously uncomfortable.  He denies any chest pain or trouble breathing.  The patient states that he does have a chronic baseline hypoxia, usually wears oxygen at night, denies any shortness of breath currently has had a history of acute chest when he was a child states this feels different.  Patient was actually surprised to hear his saturation was 93% this is typically higher than normal.  This lowers my suspicion for acute chest syndrome especially coupled with clear breath sounds bilaterally and the fact that he is denying any viral type symptoms no cough congestion fever.  We will evaluate for potential viral etiology, check labs including LDH reticulocyte send his hemoglobin, treat per care path.  Disposition is pending, EKG reassuring nonischemic.  [facility] [RH]   1124 EKG at 0755 on 1/19/2025 as interpreted by myself: Ventricular rate 79, , QRS 98, QTc 396.  Normal sinus rhythm.  No acute injury pattern. [AL]      ED Course User Index  [AL]  Santy Rogel DO  [RH] Rogelio Palma DO         Diagnoses as of 01/19/25 2147   Sickle cell pain crisis (Multi)     Disposition   As a result of their workup, the patient will require admission to the hospital.  The patient was informed of his diagnosis.  The patient was given the opportunity to ask questions and I answered them. The patient agreed to be admitted to the hospital.    Procedures   Procedures    Patient seen and discussed with ED attending physician.    Santy Rogel DO  Emergency Medicine     Santy Rogel DO  Resident  01/19/25 2147

## 2025-01-19 NOTE — H&P
History Of Present Illness  Joellen Narayan is a 24 y.o. 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 He reports he took his home meds without relief. Pain is described as a deep, aching sensation. He is unsure what causes his current crisis. Denies any HA, dizziness/lightheadedness, fevers/chills, cough, congestion, rhinorrhea, sore throat, SOB, CP, palpitations, abdominal pain, n/v/d/c, melena, dysuria, urgency/frequency, hematuria, numbness/tingling, bruising/bleeding or rashes. Denies any sick contacts. ROS otherwise negative.    ED Course:  - Vitals stable  - WBC 14.3k, Hgb 10.7, Hct 29.7, Plt 291  - Tbili 7.2,   - Flu A/B and COVID negative  - CXR negative for acute process  - s/p PO Tylenol 975mg x1, IV dilaudid 1mg x1, IV Morphine 8mg x2, IV Toradol 15mg x1, Robaxin 500mg x1  - s/p 1L LR bolus  - Admitted for further management of pain    Past Medical History  He has a past medical history of Acute chest syndrome (Multi), Corrosion of unspecified body region, unspecified degree (12/31/2014), Impetigo (01/04/2024), Nicotine use disorder, Nodular lymphocyte predominant Hodgkin lymphoma, Personal history of diseases of the blood and blood-forming organs and certain disorders involving the immune mechanism, Personal history of other (healed) physical injury and trauma (01/03/2015), Personal history of other diseases of the circulatory system, Personal history of other diseases of the circulatory system, Priapism, Rash and other nonspecific skin eruption (09/09/2014), and Sickle cell disease  (Multi).    Surgical History  He has a past surgical history that includes IR CVC tunneled (6/21/2022); CT guided percutaneous biopsy LYMPH node superficial (11/18/2022); and CT guided percutaneous abdominal retroperitoneum biopsy (11/30/2023).    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 He has been exposed to tobacco smoke. He has never used smokeless tobacco. He reports that he does not currently use alcohol. He reports current drug use. Drug: Marijuana.     Allergies  Cefepime, Amoxicillin, and Ceftriaxone    Physical Exam  Vitals reviewed.   Constitutional:       General: He is in acute distress.      Appearance: Normal appearance.   HENT:      Head: Normocephalic and atraumatic.      Nose: Nose normal.      Mouth/Throat:      Mouth: Mucous membranes are moist.      Pharynx: Oropharynx is clear.   Eyes:      Extraocular Movements: Extraocular movements intact.      Pupils: Pupils are equal, round, and reactive to light.   Cardiovascular:      Rate and Rhythm: Normal rate and regular rhythm.      Pulses: Normal pulses.      Heart sounds: Normal heart sounds.   Pulmonary:      Effort: Pulmonary effort is normal.      Breath sounds: Normal breath sounds.   Abdominal:      General: Bowel sounds are normal.      Palpations: Abdomen is soft.   Musculoskeletal:         General: Normal range of motion.   Skin:     General: Skin is warm.   Neurological:      General: No focal deficit present.      Mental Status: He is alert and oriented to person, place, and time. Mental status is at baseline.   Psychiatric:         Mood and Affect:  "Mood normal.         Behavior: Behavior normal.          Last Recorded Vitals  Blood pressure (!) 138/95, pulse 63, temperature 36.8 °C (98.2 °F), resp. rate (!) 22, height 1.88 m (6' 2\"), weight 72.6 kg (160 lb), SpO2 96%.    Results for orders placed or performed during the hospital encounter of 01/19/25 (from the past 24 hours)   CBC and Auto Differential   Result Value Ref Range    WBC 14.3 (H) 4.4 - 11.3 x10*3/uL    nRBC 2.1 (H) 0.0 - 0.0 /100 WBCs    RBC 3.64 (L) 4.50 - 5.90 x10*6/uL    Hemoglobin 10.7 (L) 13.5 - 17.5 g/dL    Hematocrit 29.7 (L) 41.0 - 52.0 %    MCV 82 80 - 100 fL    MCH 29.4 26.0 - 34.0 pg    MCHC 36.0 32.0 - 36.0 g/dL    RDW 23.0 (H) 11.5 - 14.5 %    Platelets 291 150 - 450 x10*3/uL    Neutrophils % 71.0 40.0 - 80.0 %    Immature Granulocytes %, Automated 3.7 (H) 0.0 - 0.9 %    Lymphocytes % 11.8 13.0 - 44.0 %    Monocytes % 10.5 2.0 - 10.0 %    Eosinophils % 2.4 0.0 - 6.0 %    Basophils % 0.6 0.0 - 2.0 %    Neutrophils Absolute 10.17 (H) 1.20 - 7.70 x10*3/uL    Immature Granulocytes Absolute, Automated 0.53 0.00 - 0.70 x10*3/uL    Lymphocytes Absolute 1.69 1.20 - 4.80 x10*3/uL    Monocytes Absolute 1.50 (H) 0.10 - 1.00 x10*3/uL    Eosinophils Absolute 0.35 0.00 - 0.70 x10*3/uL    Basophils Absolute 0.08 0.00 - 0.10 x10*3/uL   Comprehensive metabolic panel   Result Value Ref Range    Glucose 90 74 - 99 mg/dL    Sodium 139 136 - 145 mmol/L    Potassium 3.8 3.5 - 5.3 mmol/L    Chloride 109 (H) 98 - 107 mmol/L    Bicarbonate 26 21 - 32 mmol/L    Anion Gap 8 (L) 10 - 20 mmol/L    Urea Nitrogen 7 6 - 23 mg/dL    Creatinine 0.67 0.50 - 1.30 mg/dL    eGFR >90 >60 mL/min/1.73m*2    Calcium 9.4 8.6 - 10.6 mg/dL    Albumin 4.2 3.4 - 5.0 g/dL    Alkaline Phosphatase 148 (H) 33 - 120 U/L    Total Protein 6.9 6.4 - 8.2 g/dL    AST 37 9 - 39 U/L    Bilirubin, Total 7.2 (H) 0.0 - 1.2 mg/dL    ALT 23 10 - 52 U/L   Reticulocytes   Result Value Ref Range    Retic % 18.7 (H) 0.5 - 2.0 %    Retic Absolute 0.680 " (H) 0.022 - 0.118 x10*6/uL    Reticulocyte Hemoglobin 30 28 - 38 pg    Immature Retic fraction 24.0 (H) <=16.0 %   LDH, Lactate dehydrogenase   Result Value Ref Range     (H) 84 - 246 U/L   Sars-CoV-2 and Influenza A/B PCR   Result Value Ref Range    Flu A Result Not Detected Not Detected    Flu B Result Not Detected Not Detected    Coronavirus 2019, PCR Not Detected Not Detected   Morphology   Result Value Ref Range    RBC Morphology See Below     Polychromasia Mild     Sickle Cells Few     Target Cells Many     Teardrop Cells Few     Acanthocytes Few     Pappenheimer Bodies Present      *Note: Due to a large number of results and/or encounters for the requested time period, some results have not been displayed. A complete set of results can be found in Results Review.       Scheduled medications  oxygen, , inhalation, Continuous - Inhalation      Scheduled medications  enoxaparin, 40 mg, subcutaneous, q24h  folic acid, 1 mg, oral, Daily  oxygen, , inhalation, Continuous - Inhalation  polyethylene glycol, 17 g, oral, Daily  sennosides-docusate sodium, 2 tablet, oral, BID      Continuous medications     PRN medications  PRN medications: diphenhydrAMINE, HYDROmorphone, ondansetron       Assessment/Plan   Assessment & Plan  Sickle cell pain crisis (Multi)  Joellen Narayan is a 24 y.o. 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 Hgb and lysis labs at baseline. CXR (1/19) without acute process. Admitted for further management on pain. Started on IV dilaudid per care plan. DC pending improvement in pain.    # Hgb  SS with Pain  - OARRS reviewed, no aberrancies  - No current care path  - Hgb baseline ~8.5; Hgb 10.7 (1/19); no indication for transfusion  - Tbili baseline fluctuates ~5 (planning for cholecystectomy 1/30); Tbili 7.2 (1/19)  - LDH baseline ~500;  (1/19)  - s/p 1L IV LR in ED 1/19  - On admit started IV dilaudid 3mg q2hrs PRN severe pain  - Holding off on IV Toradol for now d/t no AVN-related pain  - Continue home folic acid 1mg daily  - Pre exchange labs sent 1/19 d/t reported severity of pain  - Hgb S (1/19): pending  - Utox (1/19): pending  - PO Zofran PRN for opioid-induced nausea and PO Benadryl PRN for opioid-induced pruritus  - Bowel regimen for opioid-induced constipation with DocuSenna 2tabs BID and Miralax daily     # Hx of Priapism  - Recent admission c/b worsening priapism needing urgent [facility]Exchange (9/19)  - Denies recurrent issues with priapism outpt recently  - Continue Sudafed 60mg q8h PRN for priapism      # Recent Choledocholithiasis  # Plan for Cholecystectomy 1/31/25  - s/p ERCP 7/18/24 with a biliary sphincterotomy where sludge and stones were removed, achieving complete clearance  - Seen by gen surg 8/8/24 Dr. Dove who rec lap cholecystectomy d/t the chance of recurrence of choledocholithiasis  - Tbili baseline fluctuates ~5; Tbili 7.2 (1/19)  - No active abd pain on admit; low threshold for CT a/p or RUQ US if abdominal pain occurs  - Planning for cholecystectomy outpt 1/31/25     # Nodular lymphocyte predominant Hodgkins lymphoma (NLPHL)   - Primary Oncologist: Dr. Stoll  - Enlarged lymph nodes noted 4/1/22  - RUQ US (11/14/22) with mildly enlarged LNs in the region of the kavin hepatis  - MRI liver (11/16/22) showed re-demonstration of bulky retroperitoneal lymphadenopathy and kavin hepatic lymphadenopathy    - (11/18/22) lymph node biopsy showed atypical lymphoid infiltrate. Reviewed by Hemepath board, insufficient for lymphoma diagnosis  - PET/CT (12/6/22) showing retroperitoneal  "lymphadenopathy  - Followed up with Dr. Stoll (12/16/22) with plan for surg/onc consult for large tissue bx but patient missed apt and was lost to follow up  - Requested new apt with Dr. Ronnie Marte 6/19/23, patient was not seen and lost to follow up  - CT a/p (11/28/23) increased size of retroperitoneal lymph nodes reflecting extramedullary hematopoiesis I/s/o sickle cell vs lymphoma  - Paraaortic LN bx via IR (11/30/23) consistent with NLPHL. Flow: no clonal B cell or T cell population identified, lymphocyte 95%, CD3+CD4+ 68%, CD3+CD8+ 7%, CD19+ 24%  - Elevated LDH/bili partially from sickle cell disease   - Chemotherapy (R-CHOP) was discussed with primary oncologist Dr. Stoll, and decision was made to simplify his chemotherapy to Rituxan and prednisone q3 weeks mainly due to frequent sickle cell crisis  - Current chemo regimen: Rituxan and prednisone q3 weeks (C1 1/18/24, C2 2/8/24, Missed C3 d/t sickle cell pain crisis, C4 4/4/24, C5 5/16/24, C6 6/7/24)  - Per pt no longer taking Acyclovir 400mg BID prophy   - PET CT (7/25) overall showing great response to treatment with persistent residual viable disease involving a left common iliac node  - PET/CT (11/18) showing Interval increased metabolic activity within upper abdominal and  bilateral inguinal lymph nodes compatible with interval worsening of lymphomatous disease  - Last FUV with Dr. Stoll 11/21, d/w pt and mother regarding tx options vs observation. No current treatment at this time and will continue with observation and plan for repeat CT/PET in 3 months and follow up after that  - Next PET/CT 3/4, Dr. Stoll FUV 3/13     # Hx of nocturnal oxygen dependence and hypoxia on room air  - Pt currently has home 2L supp O2   - Wean as able, encourage incentive spirometry  - Pulm FUV 8/8- \"Given his history of sickle cell disease, it is important to ensure he has formal sleep testing to look at desaturations and presence of sleep apnea despite not having a " "convincing history/body habitus typical for suspecting sleep apnea- patients with sickle cell have a higher prevalence of sleep disorders including nocturnal hypoxemia\"--> rec sleep referral for formal testing if deemed appropriate, ABG and O2 destat testing, and TTE with bubble study  - TTE 8/23 showing EF 60-65%, severely dilated LV and LA, + intrapulmonary shunting  - Has Pulm FUV 2/13  - Pt currently on 2L supp O2  - Encourage IS     DVT prophy: Lovenox subcutaneous, SCDs, encourage ambulation     DISPO:  - Full code, confirmed on admit  - DC pending improvement in pain  - SUSIE Narayan (Parent) [phone]  - Cardiology FUV 1/28, Cholecystectomy 1/31, Pulm EPV 2/13, PET/CT 3/4, Dr. Stoll FUV 3/13, Sickle Cell FUV 4/9    I spent >75 minutes in the professional and overall care of this patient    Assessment and plan as above to be discussed with attending physician Dr. Deanna Correia in the AM    RAAD Khoury-CNP    "

## 2025-01-19 NOTE — ASSESSMENT & PLAN NOTE
Joellen Narayan is a 24 y.o. 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 Hgb and lysis labs at baseline. CXR (1/19) without acute process. Admitted for further management on pain. Started on IV dilaudid per care plan. DC pending improvement in pain.    # Hgb SS with Pain  - OARRS reviewed, no aberrancies  - No current care path  - Hgb baseline ~8.5; Hgb 10.7 (1/19); no indication for transfusion  - Tbili baseline fluctuates ~5 (planning for cholecystectomy 1/30); Tbili 7.2 (1/19)  - LDH baseline ~500;  (1/19)  - s/p 1L IV LR in ED 1/19  - On admit started IV dilaudid 3mg q2hrs PRN severe pain  - Holding off on IV Toradol for now d/t no AVN-related pain  - Continue home folic acid 1mg daily  - Pre exchange labs sent 1/19 d/t reported severity of pain  - Hgb S (1/19): pending  - Utox (1/19): pending  - PO Zofran PRN for opioid-induced nausea and PO Benadryl PRN for opioid-induced pruritus  - Bowel regimen for opioid-induced constipation with DocuSenna 2tabs BID and Miralax daily     # Hx of Priapism  - Recent admission c/b worsening priapism needing urgent [facility] (9/19)  - Denies recurrent issues with priapism outpt recently  - Continue Sudafed 60mg q8h PRN for priapism      # Recent Choledocholithiasis  # Plan for Cholecystectomy 1/31/25  - s/p ERCP 7/18/24 with a biliary sphincterotomy where sludge and stones were removed, achieving complete clearance  - Seen by gen surg 8/8/24 Dr. Dove who rec lap cholecystectomy d/t the chance of recurrence of choledocholithiasis  - Tbili baseline fluctuates ~5; Tbili  7.2 (1/19)  - No active abd pain on admit; low threshold for CT a/p or RUQ US if abdominal pain occurs  - Planning for cholecystectomy outpt 1/31/25     # Nodular lymphocyte predominant Hodgkins lymphoma (NLPHL)   - Primary Oncologist: Dr. Stoll  - Enlarged lymph nodes noted 4/1/22  - RUQ US (11/14/22) with mildly enlarged LNs in the region of the kavin hepatis  - MRI liver (11/16/22) showed re-demonstration of bulky retroperitoneal lymphadenopathy and kavin hepatic lymphadenopathy    - (11/18/22) lymph node biopsy showed atypical lymphoid infiltrate. Reviewed by Hemepath board, insufficient for lymphoma diagnosis  - PET/CT (12/6/22) showing retroperitoneal lymphadenopathy  - Followed up with Dr. Stoll (12/16/22) with plan for surg/onc consult for large tissue bx but patient missed apt and was lost to follow up  - Requested new apt with Dr. Ronnie Marte 6/19/23, patient was not seen and lost to follow up  - CT a/p (11/28/23) increased size of retroperitoneal lymph nodes reflecting extramedullary hematopoiesis I/s/o sickle cell vs lymphoma  - Paraaortic LN bx via IR (11/30/23) consistent with NLPHL. Flow: no clonal B cell or T cell population identified, lymphocyte 95%, CD3+CD4+ 68%, CD3+CD8+ 7%, CD19+ 24%  - Elevated LDH/bili partially from sickle cell disease   - Chemotherapy (R-CHOP) was discussed with primary oncologist Dr. Stoll, and decision was made to simplify his chemotherapy to Rituxan and prednisone q3 weeks mainly due to frequent sickle cell crisis  - Current chemo regimen: Rituxan and prednisone q3 weeks (C1 1/18/24, C2 2/8/24, Missed C3 d/t sickle cell pain crisis, C4 4/4/24, C5 5/16/24, C6 6/7/24)  - Per pt no longer taking Acyclovir 400mg BID prophy   - PET CT (7/25) overall showing great response to treatment with persistent residual viable disease involving a left common iliac node  - PET/CT (11/18) showing Interval increased metabolic activity within upper abdominal and  bilateral inguinal lymph  "nodes compatible with interval worsening of lymphomatous disease  - Last FUV with Dr. Stoll 11/21, d/w pt and mother regarding tx options vs observation. No current treatment at this time and will continue with observation and plan for repeat CT/PET in 3 months and follow up after that  - Next PET/CT 3/4, Dr. Stoll FUV 3/13     # Hx of nocturnal oxygen dependence and hypoxia on room air  - Pt currently has home 2L supp O2   - Wean as able, encourage incentive spirometry  - Pulm FUV 8/8- \"Given his history of sickle cell disease, it is important to ensure he has formal sleep testing to look at desaturations and presence of sleep apnea despite not having a convincing history/body habitus typical for suspecting sleep apnea- patients with sickle cell have a higher prevalence of sleep disorders including nocturnal hypoxemia\"--> rec sleep referral for formal testing if deemed appropriate, ABG and O2 destat testing, and TTE with bubble study  - TTE 8/23 showing EF 60-65%, severely dilated LV and LA, + intrapulmonary shunting  - Has Pulm FUV 2/13  - Pt currently on 2L supp O2  - Encourage IS     DVT prophy: Lovenox subcutaneous, SCDs, encourage ambulation     DISPO:  - Full code, confirmed on admit  - DC pending improvement in pain  - SUSIE Narayan (Parent) [phone]  - Cardiology FUV 1/28, Cholecystectomy 1/31, Pulm EPV 2/13, PET/CT 3/4, Dr. Stoll FUV 3/13, Sickle Cell FUV 4/9  "

## 2025-01-20 PROBLEM — D57.00 SICKLE CELL PAIN CRISIS (MULTI): Status: RESOLVED | Noted: 2025-01-19 | Resolved: 2025-01-20

## 2025-01-20 LAB
ALBUMIN SERPL BCP-MCNC: 4.7 G/DL (ref 3.4–5)
ALP SERPL-CCNC: 137 U/L (ref 33–120)
ALT SERPL W P-5'-P-CCNC: 20 U/L (ref 10–52)
ANION GAP SERPL CALC-SCNC: 13 MMOL/L (ref 10–20)
AST SERPL W P-5'-P-CCNC: 37 U/L (ref 9–39)
BASOPHILS # BLD MANUAL: 0.13 X10*3/UL (ref 0–0.1)
BASOPHILS NFR BLD MANUAL: 0.9 %
BILIRUB SERPL-MCNC: 8.6 MG/DL (ref 0–1.2)
BUN SERPL-MCNC: 7 MG/DL (ref 6–23)
BURR CELLS BLD QL SMEAR: ABNORMAL
CALCIUM SERPL-MCNC: 9.3 MG/DL (ref 8.6–10.6)
CHLORIDE SERPL-SCNC: 104 MMOL/L (ref 98–107)
CO2 SERPL-SCNC: 27 MMOL/L (ref 21–32)
CREAT SERPL-MCNC: 0.55 MG/DL (ref 0.5–1.3)
EGFRCR SERPLBLD CKD-EPI 2021: >90 ML/MIN/1.73M*2
EOSINOPHIL # BLD MANUAL: 0.52 X10*3/UL (ref 0–0.7)
EOSINOPHIL NFR BLD MANUAL: 3.5 %
ERYTHROCYTE [DISTWIDTH] IN BLOOD BY AUTOMATED COUNT: 23.7 % (ref 11.5–14.5)
GLUCOSE SERPL-MCNC: 72 MG/DL (ref 74–99)
HCT VFR BLD AUTO: 29.6 % (ref 41–52)
HEMOGLOBIN A2: 3.4 % (ref 2–3.5)
HEMOGLOBIN A: 36.7 % (ref 95.8–98)
HEMOGLOBIN F: 1.3 % (ref 0–2)
HEMOGLOBIN IDENTIFICATION INTERPRETATION: ABNORMAL
HEMOGLOBIN S: 58.6 %
HGB BLD-MCNC: 10.4 G/DL (ref 13.5–17.5)
HGB RETIC QN: 33 PG (ref 28–38)
HOWELL-JOLLY BOD BLD QL SMEAR: PRESENT
HYPOCHROMIA BLD QL SMEAR: ABNORMAL
IMM GRANULOCYTES # BLD AUTO: 0.13 X10*3/UL (ref 0–0.7)
IMM GRANULOCYTES NFR BLD AUTO: 0.9 % (ref 0–0.9)
IMMATURE RETIC FRACTION: 22 %
LDH SERPL L TO P-CCNC: 370 U/L (ref 84–246)
LYMPHOCYTES # BLD MANUAL: 2.23 X10*3/UL (ref 1.2–4.8)
LYMPHOCYTES NFR BLD MANUAL: 15.1 %
MCH RBC QN AUTO: 29.1 PG (ref 26–34)
MCHC RBC AUTO-ENTMCNC: 35.1 G/DL (ref 32–36)
MCV RBC AUTO: 83 FL (ref 80–100)
MONOCYTES # BLD MANUAL: 2.49 X10*3/UL (ref 0.1–1)
MONOCYTES NFR BLD MANUAL: 16.8 %
NEUTROPHILS # BLD MANUAL: 9.42 X10*3/UL (ref 1.2–7.7)
NEUTS BAND # BLD MANUAL: 0.13 X10*3/UL (ref 0–0.7)
NEUTS BAND NFR BLD MANUAL: 0.9 %
NEUTS SEG # BLD MANUAL: 9.29 X10*3/UL (ref 1.2–7)
NEUTS SEG NFR BLD MANUAL: 62.8 %
NRBC BLD-RTO: 2.9 /100 WBCS (ref 0–0)
OVALOCYTES BLD QL SMEAR: ABNORMAL
PAPPENHEIMER BOD BLD QL SMEAR: PRESENT
PATH REVIEW-HGB IDENTIFICATION: ABNORMAL
PLATELET # BLD AUTO: 274 X10*3/UL (ref 150–450)
POLYCHROMASIA BLD QL SMEAR: ABNORMAL
POTASSIUM SERPL-SCNC: 3.9 MMOL/L (ref 3.5–5.3)
PROT SERPL-MCNC: 7.1 G/DL (ref 6.4–8.2)
RBC # BLD AUTO: 3.57 X10*6/UL (ref 4.5–5.9)
RBC MORPH BLD: ABNORMAL
RETICS #: 0.73 X10*6/UL (ref 0.02–0.12)
RETICS/RBC NFR AUTO: 20.4 % (ref 0.5–2)
SICKLE CELLS BLD QL SMEAR: ABNORMAL
SODIUM SERPL-SCNC: 140 MMOL/L (ref 136–145)
TARGETS BLD QL SMEAR: ABNORMAL
TOTAL CELLS COUNTED BLD: 113
WBC # BLD AUTO: 14.8 X10*3/UL (ref 4.4–11.3)

## 2025-01-20 PROCEDURE — 2500000005 HC RX 250 GENERAL PHARMACY W/O HCPCS: Performed by: PHYSICIAN ASSISTANT

## 2025-01-20 PROCEDURE — 85027 COMPLETE CBC AUTOMATED: CPT | Performed by: NURSE PRACTITIONER

## 2025-01-20 PROCEDURE — 36415 COLL VENOUS BLD VENIPUNCTURE: CPT | Performed by: NURSE PRACTITIONER

## 2025-01-20 PROCEDURE — 2500000001 HC RX 250 WO HCPCS SELF ADMINISTERED DRUGS (ALT 637 FOR MEDICARE OP): Performed by: NURSE PRACTITIONER

## 2025-01-20 PROCEDURE — 2500000004 HC RX 250 GENERAL PHARMACY W/ HCPCS (ALT 636 FOR OP/ED): Performed by: NURSE PRACTITIONER

## 2025-01-20 PROCEDURE — 83615 LACTATE (LD) (LDH) ENZYME: CPT | Performed by: NURSE PRACTITIONER

## 2025-01-20 PROCEDURE — 85045 AUTOMATED RETICULOCYTE COUNT: CPT | Performed by: NURSE PRACTITIONER

## 2025-01-20 PROCEDURE — 2500000001 HC RX 250 WO HCPCS SELF ADMINISTERED DRUGS (ALT 637 FOR MEDICARE OP): Performed by: PHYSICIAN ASSISTANT

## 2025-01-20 PROCEDURE — 2500000004 HC RX 250 GENERAL PHARMACY W/ HCPCS (ALT 636 FOR OP/ED): Performed by: PHYSICIAN ASSISTANT

## 2025-01-20 PROCEDURE — 99233 SBSQ HOSP IP/OBS HIGH 50: CPT | Performed by: HOSPITALIST

## 2025-01-20 PROCEDURE — 2500000004 HC RX 250 GENERAL PHARMACY W/ HCPCS (ALT 636 FOR OP/ED)

## 2025-01-20 PROCEDURE — 84075 ASSAY ALKALINE PHOSPHATASE: CPT | Performed by: NURSE PRACTITIONER

## 2025-01-20 PROCEDURE — 1170000001 HC PRIVATE ONCOLOGY ROOM DAILY

## 2025-01-20 PROCEDURE — 2500000005 HC RX 250 GENERAL PHARMACY W/O HCPCS: Performed by: NURSE PRACTITIONER

## 2025-01-20 PROCEDURE — 85007 BL SMEAR W/DIFF WBC COUNT: CPT | Performed by: NURSE PRACTITIONER

## 2025-01-20 PROCEDURE — 2500000004 HC RX 250 GENERAL PHARMACY W/ HCPCS (ALT 636 FOR OP/ED): Performed by: STUDENT IN AN ORGANIZED HEALTH CARE EDUCATION/TRAINING PROGRAM

## 2025-01-20 RX ORDER — LIDOCAINE 560 MG/1
1 PATCH PERCUTANEOUS; TOPICAL; TRANSDERMAL DAILY
Status: DISCONTINUED | OUTPATIENT
Start: 2025-01-20 | End: 2025-01-24

## 2025-01-20 RX ORDER — HYDROMORPHONE HYDROCHLORIDE 2 MG/ML
4 INJECTION, SOLUTION INTRAMUSCULAR; INTRAVENOUS; SUBCUTANEOUS EVERY 2 HOUR PRN
Status: DISCONTINUED | OUTPATIENT
Start: 2025-01-20 | End: 2025-01-20

## 2025-01-20 RX ORDER — OXYCODONE HYDROCHLORIDE 20 MG/1
20 TABLET ORAL EVERY 8 HOURS PRN
Status: ON HOLD | COMMUNITY
End: 2025-01-28 | Stop reason: SDUPTHER

## 2025-01-20 RX ORDER — DEXTROSE MONOHYDRATE AND SODIUM CHLORIDE 5; .45 G/100ML; G/100ML
75 INJECTION, SOLUTION INTRAVENOUS CONTINUOUS
Status: ACTIVE | OUTPATIENT
Start: 2025-01-20 | End: 2025-01-20

## 2025-01-20 RX ADMIN — KETOROLAC TROMETHAMINE 15 MG: 15 INJECTION, SOLUTION INTRAMUSCULAR; INTRAVENOUS at 01:24

## 2025-01-20 RX ADMIN — HYDROMORPHONE HYDROCHLORIDE 2 MG: 2 INJECTION INTRAMUSCULAR; INTRAVENOUS; SUBCUTANEOUS at 07:24

## 2025-01-20 RX ADMIN — DIPHENHYDRAMINE HYDROCHLORIDE 25 MG: 25 CAPSULE ORAL at 03:33

## 2025-01-20 RX ADMIN — FOLIC ACID 1 MG: 1 TABLET ORAL at 09:51

## 2025-01-20 RX ADMIN — HYDROMORPHONE HYDROCHLORIDE 2 MG: 2 INJECTION INTRAMUSCULAR; INTRAVENOUS; SUBCUTANEOUS at 04:38

## 2025-01-20 RX ADMIN — HYDROMORPHONE HYDROCHLORIDE 2 MG: 2 INJECTION, SOLUTION INTRAMUSCULAR; INTRAVENOUS; SUBCUTANEOUS at 16:33

## 2025-01-20 RX ADMIN — HYDROMORPHONE HYDROCHLORIDE 4 MG: 2 INJECTION, SOLUTION INTRAMUSCULAR; INTRAVENOUS; SUBCUTANEOUS at 09:41

## 2025-01-20 RX ADMIN — HYDROMORPHONE HYDROCHLORIDE 4 MG: 2 INJECTION, SOLUTION INTRAMUSCULAR; INTRAVENOUS; SUBCUTANEOUS at 15:40

## 2025-01-20 RX ADMIN — HYDROMORPHONE HYDROCHLORIDE 4 MG: 2 INJECTION, SOLUTION INTRAMUSCULAR; INTRAVENOUS; SUBCUTANEOUS at 13:41

## 2025-01-20 RX ADMIN — KETOROLAC TROMETHAMINE 15 MG: 15 INJECTION, SOLUTION INTRAMUSCULAR; INTRAVENOUS at 09:46

## 2025-01-20 RX ADMIN — LIDOCAINE 4% 1 PATCH: 40 PATCH TOPICAL at 09:59

## 2025-01-20 RX ADMIN — POLYETHYLENE GLYCOL 3350 17 G: 17 POWDER, FOR SOLUTION ORAL at 09:47

## 2025-01-20 RX ADMIN — KETOROLAC TROMETHAMINE 15 MG: 15 INJECTION, SOLUTION INTRAMUSCULAR; INTRAVENOUS at 17:41

## 2025-01-20 RX ADMIN — HYDROMORPHONE HYDROCHLORIDE 3 MG: 1 INJECTION, SOLUTION INTRAMUSCULAR; INTRAVENOUS; SUBCUTANEOUS at 03:27

## 2025-01-20 RX ADMIN — HYDROMORPHONE HYDROCHLORIDE 4 MG: 2 INJECTION, SOLUTION INTRAMUSCULAR; INTRAVENOUS; SUBCUTANEOUS at 17:41

## 2025-01-20 RX ADMIN — HYDROMORPHONE HYDROCHLORIDE 4 MG: 2 INJECTION, SOLUTION INTRAMUSCULAR; INTRAVENOUS; SUBCUTANEOUS at 20:10

## 2025-01-20 RX ADMIN — Medication 2 L/MIN: at 20:17

## 2025-01-20 RX ADMIN — HYDROMORPHONE HYDROCHLORIDE 4 MG: 2 INJECTION, SOLUTION INTRAMUSCULAR; INTRAVENOUS; SUBCUTANEOUS at 11:40

## 2025-01-20 RX ADMIN — HYDROMORPHONE HYDROCHLORIDE 4 MG: 2 INJECTION, SOLUTION INTRAMUSCULAR; INTRAVENOUS; SUBCUTANEOUS at 22:03

## 2025-01-20 RX ADMIN — HYDROMORPHONE HYDROCHLORIDE 3 MG: 1 INJECTION, SOLUTION INTRAMUSCULAR; INTRAVENOUS; SUBCUTANEOUS at 01:25

## 2025-01-20 RX ADMIN — HYDROMORPHONE HYDROCHLORIDE 3 MG: 1 INJECTION, SOLUTION INTRAMUSCULAR; INTRAVENOUS; SUBCUTANEOUS at 06:10

## 2025-01-20 RX ADMIN — PANTOPRAZOLE SODIUM 40 MG: 40 TABLET, DELAYED RELEASE ORAL at 06:10

## 2025-01-20 RX ADMIN — HYDROMORPHONE HYDROCHLORIDE 2 MG: 2 INJECTION, SOLUTION INTRAMUSCULAR; INTRAVENOUS; SUBCUTANEOUS at 23:26

## 2025-01-20 RX ADMIN — DEXTROSE AND SODIUM CHLORIDE 75 ML/HR: 5; 450 INJECTION, SOLUTION INTRAVENOUS at 09:50

## 2025-01-20 ASSESSMENT — PAIN SCALES - GENERAL
PAINLEVEL_OUTOF10: 10 - WORST POSSIBLE PAIN
PAINLEVEL_OUTOF10: 9
PAINLEVEL_OUTOF10: 10 - WORST POSSIBLE PAIN
PAINLEVEL_OUTOF10: 9
PAINLEVEL_OUTOF10: 9
PAINLEVEL_OUTOF10: 8
PAINLEVEL_OUTOF10: 10 - WORST POSSIBLE PAIN
PAINLEVEL_OUTOF10: 9
PAINLEVEL_OUTOF10: 10 - WORST POSSIBLE PAIN
PAINLEVEL_OUTOF10: 10 - WORST POSSIBLE PAIN
PAINLEVEL_OUTOF10: 8
PAINLEVEL_OUTOF10: 10 - WORST POSSIBLE PAIN
PAINLEVEL_OUTOF10: 9
PAINLEVEL_OUTOF10: 10 - WORST POSSIBLE PAIN

## 2025-01-20 ASSESSMENT — COGNITIVE AND FUNCTIONAL STATUS - GENERAL
DAILY ACTIVITIY SCORE: 24
MOBILITY SCORE: 24
MOBILITY SCORE: 24
DAILY ACTIVITIY SCORE: 24

## 2025-01-20 ASSESSMENT — PAIN DESCRIPTION - LOCATION
LOCATION: LEG

## 2025-01-20 ASSESSMENT — PAIN DESCRIPTION - ORIENTATION
ORIENTATION: RIGHT

## 2025-01-20 ASSESSMENT — PAIN - FUNCTIONAL ASSESSMENT
PAIN_FUNCTIONAL_ASSESSMENT: 0-10
PAIN_FUNCTIONAL_ASSESSMENT: 0-10

## 2025-01-20 NOTE — PROGRESS NOTES
Pharmacy Medication History Review    Joellen Narayan is a 24 y.o. male admitted for Sickle cell pain crisis (Multi). Pharmacy reviewed the patient's [id number] medications and allergies for accuracy.    Medications ADDED:  Oxycodone every 8 hours as needed for pain.   Medications CHANGED:  none  Medications REMOVED:   none     The list below reflects the updated PTA list.   Prior to Admission Medications   Prescriptions Last Dose Informant   baclofen (Lioresal) 5 mg tablet  Self   Sig: Take 1 tablet (5 mg) by mouth 3 times a day.   Patient not taking: Reported on 12/22/2024   chlorhexidine (Hibiclens) 4 % external liquid     Sig: Apply topically once daily as needed for wound care. Use for 5 days prior to surgery as body wash, do not use on face or genital region   chlorhexidine (Peridex) 0.12 % solution     Sig: Use 15 mL in the mouth or throat if needed for wound care for up to 2 days. Use as mouth wash for night before and morning of surgery   folic acid (Folvite) 1 mg tablet     Sig: Take 1 tablet (1 mg) by mouth once daily.   oxyCODONE (Roxicodone) 20 mg immediate release tablet     Sig: Take 1 tablet (20 mg) by mouth every 8 hours if needed for severe pain (7 - 10).   pseudoephedrine (Sudogest) 60 mg tablet     Sig: Take 1 tablet (60 mg) by mouth every 8 hours if needed for congestion for up to 10 days.   Patient not taking: Reported on 12/22/2024      Facility-Administered Medications: None        The list below reflects the updated allergy list. Please review each documented allergy for additional clarification and justification.  Allergies  Reviewed by Rodolfo Pedroza on 1/20/2025        Severity Reactions Comments    Cefepime Medium Hallucinations     Amoxicillin Low Hives     Ceftriaxone Low Hives, Rash             Patient accepts M2B at discharge.     Sources:   [facility]  Pharmacy dispense history  Patient interview Good historian  Chart Review  Care Everywhere     Additional Comments:  Patient was a  "good historian.   Patient reported only taking folic acid and oxycodone at home.      ALEXYS NINO  Pharmacy Technician  01/20/25     Secure Chat preferred   If no response call [id number] or Vocera \"Med Rec\"   "

## 2025-01-20 NOTE — ASSESSMENT & PLAN NOTE
Joellen Narayan is a 24 y.o. 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 Hgb and lysis labs at baseline. CXR (1/19) without acute process. Admitted for further management on pain. Started on IV dilaudid per care plan. Increased to IV Dilaudid 4mg q2 hrs PRN.  DC pending improvement in pain.    # Update 1/20:   - Increased IV Dilaudid to 4mg q2hrs PRN   - Added Lidocaine patch     # Hgb SS with Pain  - OARRS reviewed, no aberrancies  - No current care path  - Hgb baseline ~8.5; Hgb 10.7 (1/19); no indication for transfusion  - Tbili baseline fluctuates ~5 (planning for cholecystectomy 1/30); Tbili 7.2 (1/19)  - LDH baseline ~500;  (1/19)  - s/p 1L IV LR in ED 1/19, plan IV hydration with D51/2 X 12 hrs   - On admit started IV dilaudid 3mg q2hrs PRN severe pain, now increased to IV Dilaudid 4mg q2 hrs PRN   - Started IV Toradol 15 mg q6 X 3 days with PPI   - Started Lidocaine patch   - Continue home folic acid 1mg daily  - Pre exchange labs sent 1/19 d/t reported severity of pain  - Hgb S (1/19): pending   - Utox (1/19): positive for cannabinoid   - PO Zofran PRN for opioid-induced nausea and PO Benadryl PRN for opioid-induced pruritus  - Bowel regimen for opioid-induced constipation with DocuSenna 2tabs BID and Miralax daily     # Hx of Priapism  - Recent admission c/b worsening priapism needing urgent [facility] (9/19)  - Denies recurrent issues with priapism outpt recently  - Continue Sudafed 60mg q8h PRN for priapism      # Recent Choledocholithiasis  # Plan for Cholecystectomy 1/31/25  - s/p ERCP 7/18/24  with a biliary sphincterotomy where sludge and stones were removed, achieving complete clearance  - Seen by gen surg 8/8/24 Dr. Dove who rec lap cholecystectomy d/t the chance of recurrence of choledocholithiasis  - Tbili baseline fluctuates ~5; Tbili 7.2 (1/19)  - No active abd pain on admit; low threshold for CT a/p or RUQ US if abdominal pain occurs  - Planning for cholecystectomy outpt 1/31/25     # Nodular lymphocyte predominant Hodgkins lymphoma (NLPHL)   - Primary Oncologist: Dr. Stoll  - Enlarged lymph nodes noted 4/1/22  - RUQ US (11/14/22) with mildly enlarged LNs in the region of the kavin hepatis  - MRI liver (11/16/22) showed re-demonstration of bulky retroperitoneal lymphadenopathy and kavin hepatic lymphadenopathy    - (11/18/22) lymph node biopsy showed atypical lymphoid infiltrate. Reviewed by Hemepath board, insufficient for lymphoma diagnosis  - PET/CT (12/6/22) showing retroperitoneal lymphadenopathy  - Followed up with Dr. Stoll (12/16/22) with plan for surg/onc consult for large tissue bx but patient missed apt and was lost to follow up  - Requested new apt with Dr. Ronnie Marte 6/19/23, patient was not seen and lost to follow up  - CT a/p (11/28/23) increased size of retroperitoneal lymph nodes reflecting extramedullary hematopoiesis I/s/o sickle cell vs lymphoma  - Paraaortic LN bx via IR (11/30/23) consistent with NLPHL. Flow: no clonal B cell or T cell population identified, lymphocyte 95%, CD3+CD4+ 68%, CD3+CD8+ 7%, CD19+ 24%  - Elevated LDH/bili partially from sickle cell disease   - Chemotherapy (R-CHOP) was discussed with primary oncologist Dr. Stoll, and decision was made to simplify his chemotherapy to Rituxan and prednisone q3 weeks mainly due to frequent sickle cell crisis  - Current chemo regimen: Rituxan and prednisone q3 weeks (C1 1/18/24, C2 2/8/24, Missed C3 d/t sickle cell pain crisis, C4 4/4/24, C5 5/16/24, C6 6/7/24)  - Per pt no longer taking Acyclovir 400mg BID prophy  "  - PET CT (7/25) overall showing great response to treatment with persistent residual viable disease involving a left common iliac node  - PET/CT (11/18) showing Interval increased metabolic activity within upper abdominal and  bilateral inguinal lymph nodes compatible with interval worsening of lymphomatous disease  - Last FUV with Dr. Stoll 11/21, d/w pt and mother regarding tx options vs observation. No current treatment at this time and will continue with observation and plan for repeat CT/PET in 3 months and follow up after that  - Next PET/CT 3/4, Dr. Stoll FUV 3/13     # Hx of nocturnal oxygen dependence and hypoxia on room air  - Pt currently has home 2L supp O2   - Wean as able, encourage incentive spirometry  - Pulm FUV 8/8- \"Given his history of sickle cell disease, it is important to ensure he has formal sleep testing to look at desaturations and presence of sleep apnea despite not having a convincing history/body habitus typical for suspecting sleep apnea- patients with sickle cell have a higher prevalence of sleep disorders including nocturnal hypoxemia\"--> rec sleep referral for formal testing if deemed appropriate, ABG and O2 destat testing, and TTE with bubble study  - TTE 8/23 showing EF 60-65%, severely dilated LV and LA, + intrapulmonary shunting  - Has Pulm FUV 2/13  - Pt currently on 2L supp O2  - Encourage IS     # DVT prophy:   - Lovenox subcutaneous, SCDs, encourage ambulation     # DISPO:  - Full code, confirmed on admit  - DC pending improvement in pain  - SUSIE Narayan (Parent) [phone]  - Cardiology FUV 1/28, Cholecystectomy 1/31, Pulm EPV 2/13, PET/CT 3/4, Dr. Stoll FUV 3/13, Sickle Cell FUV 4/9  "

## 2025-01-20 NOTE — PROGRESS NOTES
Certified Child Life Specialist (CCLS) familiar with patient from previous admissions.  Patient expressed interest in guided imagery, which has been helpful in the past with relaxation and non-pharmacologic pain management.  CCLS led patient through a forest visualization, encouraging deep breathing throughout.  Patient fell asleep at the end of the intervention.  CCLS left patient to rest.  Child life will continue to provide support throughout patient's admission.    GAYATHRI Stephens  Epic Secure Chat

## 2025-01-20 NOTE — CARE PLAN
The patient's goals for the shift include Rest and Comfort    The clinical goals for the shift include Pain Manage

## 2025-01-20 NOTE — PROGRESS NOTES
"Joellen Narayan is a 24 y.o. male on day 1 of admission presenting with Sickle cell pain crisis (Multi).    Subjective   Seen this morning at bedside,still c/o severe pain in his left shin and left arm, IV Dilaudid is only helping minimally, discussed going up to 4mg today and adding Toradol as well. Denies chest pain, SOB, N/V, abd pain.     Objective     Physical Exam  Constitutional:       Appearance: Normal appearance.   HENT:      Head: Normocephalic and atraumatic.   Eyes:      Extraocular Movements: Extraocular movements intact.   Cardiovascular:      Rate and Rhythm: Normal rate and regular rhythm.   Pulmonary:      Effort: Pulmonary effort is normal.      Breath sounds: Normal breath sounds.   Abdominal:      General: Abdomen is flat.      Palpations: Abdomen is soft.   Musculoskeletal:         General: Normal range of motion.      Comments: Tenderness to right shin   Skin:     General: Skin is warm and dry.   Neurological:      General: No focal deficit present.      Mental Status: He is alert and oriented to person, place, and time.   Psychiatric:         Mood and Affect: Mood normal.         Behavior: Behavior normal.         Last Recorded Vitals  Blood pressure 122/76, pulse 91, temperature 36.9 °C (98.4 °F), temperature source Temporal, resp. rate 16, height 1.88 m (6' 2\"), weight 72.6 kg (160 lb), SpO2 98%.  Intake/Output last 3 Shifts:  No intake/output data recorded.    Relevant Results  XR chest 2 views  Result Date: 1/19/2025  Cardiomegaly but no evidence of other acute intrathoracic abnormality.   Signed by: Wyatt Hicks 1/19/2025 8:57 AM Dictation workstation:   [id number]     right upper quadrant  Result Date: 12/28/2024  Cholelithiasis without evidence of acute cholecystitis.   Hepatomegaly with hepatic steatosis.   I personally reviewed the images/study and I agree with the findings as stated by resident physician Dr. José Miguel Flowers . This study was interpreted at [place] " [place], [place]..   MACRO: None   Signed by: Jamshid Enrique 12/28/2024 7:43 AM Dictation workstation:   [id number]    Assessment/Plan   Assessment & Plan  Sickle cell pain crisis (Multi)  Joellen Narayan is a 24 y.o. 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 Hgb and lysis labs at baseline. CXR (1/19) without acute process. Admitted for further management on pain. Started on IV dilaudid per care plan. Increased to IV Dilaudid 4mg q2 hrs PRN.  DC pending improvement in pain.    # Update 1/20:   - Increased IV Dilaudid to 4mg q2hrs PRN   - Added Lidocaine patch     # Hgb SS with Pain  - OARRS reviewed, no aberrancies  - No current care path  - Hgb baseline ~8.5; Hgb 10.7 (1/19); no indication for transfusion  - Tbili baseline fluctuates ~5 (planning for cholecystectomy 1/30); Tbili 7.2 (1/19)  - LDH baseline ~500;  (1/19)  - s/p 1L IV LR in ED 1/19, plan IV hydration with D51/2 X 12 hrs   - On admit started IV dilaudid 3mg q2hrs PRN severe pain, now increased to IV Dilaudid 4mg q2 hrs PRN   - Started IV Toradol 15 mg q6 X 3 days with PPI   - Started Lidocaine patch   - Continue home folic acid 1mg daily  - Pre exchange labs sent 1/19 d/t reported severity of pain  - Hgb S (1/19): pending   - Utox (1/19): positive for cannabinoid   - PO Zofran PRN for opioid-induced nausea and PO Benadryl PRN for opioid-induced pruritus  - Bowel regimen for opioid-induced constipation with DocuSenna 2tabs BID and Miralax daily     # Hx of Priapism  - Recent admission c/b worsening  priapism needing urgent [facility] (9/19)  - Denies recurrent issues with priapism outpt recently  - Continue Sudafed 60mg q8h PRN for priapism      # Recent Choledocholithiasis  # Plan for Cholecystectomy 1/31/25  - s/p ERCP 7/18/24 with a biliary sphincterotomy where sludge and stones were removed, achieving complete clearance  - Seen by gen surg 8/8/24 Dr. Dove who rec lap cholecystectomy d/t the chance of recurrence of choledocholithiasis  - Tbili baseline fluctuates ~5; Tbili 7.2 (1/19)  - No active abd pain on admit; low threshold for CT a/p or RUQ US if abdominal pain occurs  - Planning for cholecystectomy outpt 1/31/25     # Nodular lymphocyte predominant Hodgkins lymphoma (NLPHL)   - Primary Oncologist: Dr. Stlol  - Enlarged lymph nodes noted 4/1/22  - RUQ US (11/14/22) with mildly enlarged LNs in the region of the kavin hepatis  - MRI liver (11/16/22) showed re-demonstration of bulky retroperitoneal lymphadenopathy and kavin hepatic lymphadenopathy    - (11/18/22) lymph node biopsy showed atypical lymphoid infiltrate. Reviewed by Hemepath board, insufficient for lymphoma diagnosis  - PET/CT (12/6/22) showing retroperitoneal lymphadenopathy  - Followed up with Dr. Stoll (12/16/22) with plan for surg/onc consult for large tissue bx but patient missed apt and was lost to follow up  - Requested new apt with Dr. Ronnie Marte 6/19/23, patient was not seen and lost to follow up  - CT a/p (11/28/23) increased size of retroperitoneal lymph nodes reflecting extramedullary hematopoiesis I/s/o sickle cell vs lymphoma  - Paraaortic LN bx via IR (11/30/23) consistent with NLPHL. Flow: no clonal B cell or T cell population identified, lymphocyte 95%, CD3+CD4+ 68%, CD3+CD8+ 7%, CD19+ 24%  - Elevated LDH/bili partially from sickle cell disease   - Chemotherapy (R-CHOP) was discussed with primary oncologist Dr. Stoll, and decision was made to simplify his chemotherapy to Rituxan and prednisone q3 weeks mainly due to  "frequent sickle cell crisis  - Current chemo regimen: Rituxan and prednisone q3 weeks (C1 1/18/24, C2 2/8/24, Missed C3 d/t sickle cell pain crisis, C4 4/4/24, C5 5/16/24, C6 6/7/24)  - Per pt no longer taking Acyclovir 400mg BID prophy   - PET CT (7/25) overall showing great response to treatment with persistent residual viable disease involving a left common iliac node  - PET/CT (11/18) showing Interval increased metabolic activity within upper abdominal and  bilateral inguinal lymph nodes compatible with interval worsening of lymphomatous disease  - Last FUV with Dr. Stoll 11/21, d/w pt and mother regarding tx options vs observation. No current treatment at this time and will continue with observation and plan for repeat CT/PET in 3 months and follow up after that  - Next PET/CT 3/4, Dr. Stoll FUV 3/13     # Hx of nocturnal oxygen dependence and hypoxia on room air  - Pt currently has home 2L supp O2   - Wean as able, encourage incentive spirometry  - Pulm FUV 8/8- \"Given his history of sickle cell disease, it is important to ensure he has formal sleep testing to look at desaturations and presence of sleep apnea despite not having a convincing history/body habitus typical for suspecting sleep apnea- patients with sickle cell have a higher prevalence of sleep disorders including nocturnal hypoxemia\"--> rec sleep referral for formal testing if deemed appropriate, ABG and O2 destat testing, and TTE with bubble study  - TTE 8/23 showing EF 60-65%, severely dilated LV and LA, + intrapulmonary shunting  - Has Pulm FUV 2/13  - Pt currently on 2L supp O2  - Encourage IS     # DVT prophy:   - Lovenox subcutaneous, SCDs, encourage ambulation     # DISPO:  - Full code, confirmed on admit  - DC pending improvement in pain  - SUSIE Narayan (Parent) [phone]  - Cardiology FUV 1/28, Cholecystectomy 1/31, Pulm EPV 2/13, PET/CT 3/4, Dr. Stoll FUV 3/13, Sickle Cell FUV 4/9         I spent 60 minutes in the " professional and overall care of this patient.      Sachin Reynolds PA-C

## 2025-01-20 NOTE — PROGRESS NOTES
Pharmacy Admission Order Reconciliation Review    Joellen Narayan is a 24 y.o. male admitted for Sickle cell pain crisis (Multi). Pharmacy reviewed the patient's unreconciled admission medications.    Prior to admission medications that were reviewed and acted on by the pharmacist include:  Oxycodone   These medications have been reconciled.     Any other unreconcilied medications have been addressed and will be ordered or held by the patient's medical team. Medications addressed by the pharmacist may be added or changed by the patient's medical team at any time.    Atul Huffman, PharmD  Transitions of Care Pharmacist  [facility]  Please reach out via Secure Chat for questions

## 2025-01-20 NOTE — CARE PLAN
Problem: Pain - Adult  Goal: Verbalizes/displays adequate comfort level or baseline comfort level  Outcome: Progressing     Problem: Safety - Adult  Goal: Free from fall injury  Outcome: Progressing     Problem: Discharge Planning  Goal: Discharge to home or other facility with appropriate resources  Outcome: Progressing     Problem: Chronic Conditions and Co-morbidities  Goal: Patient's chronic conditions and co-morbidity symptoms are monitored and maintained or improved  Outcome: Progressing     Problem: Pain  Goal: Takes deep breaths with improved pain control throughout the shift  Outcome: Progressing  Goal: Turns in bed with improved pain control throughout the shift  Outcome: Progressing  Goal: Walks with improved pain control throughout the shift  Outcome: Progressing  Goal: Performs ADL's with improved pain control throughout shift  Outcome: Progressing  Goal: Participates in PT with improved pain control throughout the shift  Outcome: Progressing  Goal: Free from opioid side effects throughout the shift  Outcome: Progressing  Goal: Free from acute confusion related to pain meds throughout the shift  Outcome: Progressing   The patient's goals for the shift include  rest and comfort.    The clinical goals for the shift include  pain management    Over the shift, the patient did not make progress toward the following goals. Barriers to progression include pain. Recommendations to address these barriers include prn meds.

## 2025-01-21 ENCOUNTER — APPOINTMENT (OUTPATIENT)
Dept: RADIOLOGY | Facility: HOSPITAL | Age: 25
DRG: 812 | End: 2025-01-21
Payer: COMMERCIAL

## 2025-01-21 LAB
ABO GROUP (TYPE) IN BLOOD: NORMAL
ALBUMIN SERPL BCP-MCNC: 4.6 G/DL (ref 3.4–5)
ALP SERPL-CCNC: 146 U/L (ref 33–120)
ALT SERPL W P-5'-P-CCNC: 15 U/L (ref 10–52)
ANION GAP SERPL CALC-SCNC: 14 MMOL/L (ref 10–20)
ANTIBODY SCREEN: NORMAL
APPEARANCE UR: CLEAR
APTT PPP: 29 SECONDS (ref 27–38)
AST SERPL W P-5'-P-CCNC: 34 U/L (ref 9–39)
BASOPHILS # BLD AUTO: 0.07 X10*3/UL (ref 0–0.1)
BASOPHILS NFR BLD AUTO: 0.5 %
BILIRUB DIRECT SERPL-MCNC: 1.9 MG/DL (ref 0–0.3)
BILIRUB SERPL-MCNC: 12.8 MG/DL (ref 0–1.2)
BILIRUB UR STRIP.AUTO-MCNC: NEGATIVE MG/DL
BUN SERPL-MCNC: 6 MG/DL (ref 6–23)
CA-I BLD-SCNC: 1.19 MMOL/L (ref 1.1–1.33)
CALCIUM SERPL-MCNC: 9.5 MG/DL (ref 8.6–10.6)
CHLORIDE SERPL-SCNC: 100 MMOL/L (ref 98–107)
CO2 SERPL-SCNC: 26 MMOL/L (ref 21–32)
COLOR UR: YELLOW
CREAT SERPL-MCNC: 0.56 MG/DL (ref 0.5–1.3)
CRP SERPL-MCNC: 13.31 MG/DL
EGFRCR SERPLBLD CKD-EPI 2021: >90 ML/MIN/1.73M*2
EOSINOPHIL # BLD AUTO: 0.48 X10*3/UL (ref 0–0.7)
EOSINOPHIL NFR BLD AUTO: 3.4 %
ERYTHROCYTE [DISTWIDTH] IN BLOOD BY AUTOMATED COUNT: 23.1 % (ref 11.5–14.5)
ERYTHROCYTE [SEDIMENTATION RATE] IN BLOOD BY WESTERGREN METHOD: <1 MM/H (ref 0–15)
GLUCOSE SERPL-MCNC: 66 MG/DL (ref 74–99)
GLUCOSE UR STRIP.AUTO-MCNC: NORMAL MG/DL
HCT VFR BLD AUTO: 28.5 % (ref 41–52)
HGB BLD-MCNC: 9.9 G/DL (ref 13.5–17.5)
HGB RETIC QN: 31 PG (ref 28–38)
HOWELL-JOLLY BOD BLD QL SMEAR: PRESENT
HYPOCHROMIA BLD QL SMEAR: NORMAL
IMM GRANULOCYTES # BLD AUTO: 0.11 X10*3/UL (ref 0–0.7)
IMM GRANULOCYTES NFR BLD AUTO: 0.8 % (ref 0–0.9)
IMMATURE RETIC FRACTION: 20.3 %
INR PPP: 1.3 (ref 0.9–1.1)
KETONES UR STRIP.AUTO-MCNC: NEGATIVE MG/DL
LDH SERPL L TO P-CCNC: 444 U/L (ref 84–246)
LEUKOCYTE ESTERASE UR QL STRIP.AUTO: NEGATIVE
LYMPHOCYTES # BLD AUTO: 2.12 X10*3/UL (ref 1.2–4.8)
LYMPHOCYTES NFR BLD AUTO: 15 %
MCH RBC QN AUTO: 28.9 PG (ref 26–34)
MCHC RBC AUTO-ENTMCNC: 34.7 G/DL (ref 32–36)
MCV RBC AUTO: 83 FL (ref 80–100)
MONOCYTES # BLD AUTO: 1.76 X10*3/UL (ref 0.1–1)
MONOCYTES NFR BLD AUTO: 12.4 %
NEUTROPHILS # BLD AUTO: 9.63 X10*3/UL (ref 1.2–7.7)
NEUTROPHILS NFR BLD AUTO: 67.9 %
NITRITE UR QL STRIP.AUTO: NEGATIVE
NRBC BLD-RTO: 3.9 /100 WBCS (ref 0–0)
PAPPENHEIMER BOD BLD QL SMEAR: PRESENT
PH UR STRIP.AUTO: 6.5 [PH]
PLATELET # BLD AUTO: 229 X10*3/UL (ref 150–450)
POLYCHROMASIA BLD QL SMEAR: NORMAL
POTASSIUM SERPL-SCNC: 3.4 MMOL/L (ref 3.5–5.3)
PROT SERPL-MCNC: 6.8 G/DL (ref 6.4–8.2)
PROT UR STRIP.AUTO-MCNC: NEGATIVE MG/DL
PROTHROMBIN TIME: 14.2 SECONDS (ref 9.8–12.8)
RBC # BLD AUTO: 3.42 X10*6/UL (ref 4.5–5.9)
RBC # UR STRIP.AUTO: NEGATIVE /UL
RBC MORPH BLD: NORMAL
RETICS #: 0.69 X10*6/UL (ref 0.02–0.12)
RETICS/RBC NFR AUTO: 20.2 % (ref 0.5–2)
RH FACTOR (ANTIGEN D): NORMAL
SICKLE CELLS BLD QL SMEAR: NORMAL
SODIUM SERPL-SCNC: 137 MMOL/L (ref 136–145)
SP GR UR STRIP.AUTO: 1.01
TARGETS BLD QL SMEAR: NORMAL
UROBILINOGEN UR STRIP.AUTO-MCNC: ABNORMAL MG/DL
WBC # BLD AUTO: 14.2 X10*3/UL (ref 4.4–11.3)

## 2025-01-21 PROCEDURE — 85652 RBC SED RATE AUTOMATED: CPT | Performed by: PHYSICIAN ASSISTANT

## 2025-01-21 PROCEDURE — 73030 X-RAY EXAM OF SHOULDER: CPT | Mod: LEFT SIDE | Performed by: STUDENT IN AN ORGANIZED HEALTH CARE EDUCATION/TRAINING PROGRAM

## 2025-01-21 PROCEDURE — 85025 COMPLETE CBC W/AUTO DIFF WBC: CPT | Performed by: NURSE PRACTITIONER

## 2025-01-21 PROCEDURE — 36415 COLL VENOUS BLD VENIPUNCTURE: CPT | Performed by: PHYSICIAN ASSISTANT

## 2025-01-21 PROCEDURE — 86901 BLOOD TYPING SEROLOGIC RH(D): CPT | Performed by: PHYSICIAN ASSISTANT

## 2025-01-21 PROCEDURE — 82565 ASSAY OF CREATININE: CPT | Performed by: NURSE PRACTITIONER

## 2025-01-21 PROCEDURE — 86140 C-REACTIVE PROTEIN: CPT | Performed by: PHYSICIAN ASSISTANT

## 2025-01-21 PROCEDURE — 82330 ASSAY OF CALCIUM: CPT | Performed by: PHYSICIAN ASSISTANT

## 2025-01-21 PROCEDURE — 85610 PROTHROMBIN TIME: CPT | Performed by: PHYSICIAN ASSISTANT

## 2025-01-21 PROCEDURE — 1170000001 HC PRIVATE ONCOLOGY ROOM DAILY

## 2025-01-21 PROCEDURE — 82248 BILIRUBIN DIRECT: CPT | Performed by: PHYSICIAN ASSISTANT

## 2025-01-21 PROCEDURE — 2500000001 HC RX 250 WO HCPCS SELF ADMINISTERED DRUGS (ALT 637 FOR MEDICARE OP): Performed by: NURSE PRACTITIONER

## 2025-01-21 PROCEDURE — 73030 X-RAY EXAM OF SHOULDER: CPT | Mod: LT

## 2025-01-21 PROCEDURE — 85045 AUTOMATED RETICULOCYTE COUNT: CPT | Performed by: NURSE PRACTITIONER

## 2025-01-21 PROCEDURE — 87040 BLOOD CULTURE FOR BACTERIA: CPT | Performed by: PHYSICIAN ASSISTANT

## 2025-01-21 PROCEDURE — 81003 URINALYSIS AUTO W/O SCOPE: CPT | Performed by: PHYSICIAN ASSISTANT

## 2025-01-21 PROCEDURE — 2500000005 HC RX 250 GENERAL PHARMACY W/O HCPCS: Performed by: PHYSICIAN ASSISTANT

## 2025-01-21 PROCEDURE — 2500000004 HC RX 250 GENERAL PHARMACY W/ HCPCS (ALT 636 FOR OP/ED): Performed by: PHYSICIAN ASSISTANT

## 2025-01-21 PROCEDURE — 83615 LACTATE (LD) (LDH) ENZYME: CPT | Performed by: NURSE PRACTITIONER

## 2025-01-21 PROCEDURE — 36415 COLL VENOUS BLD VENIPUNCTURE: CPT | Performed by: NURSE PRACTITIONER

## 2025-01-21 PROCEDURE — 73590 X-RAY EXAM OF LOWER LEG: CPT | Mod: RT

## 2025-01-21 PROCEDURE — 99233 SBSQ HOSP IP/OBS HIGH 50: CPT | Performed by: PHYSICIAN ASSISTANT

## 2025-01-21 PROCEDURE — 73590 X-RAY EXAM OF LOWER LEG: CPT | Mod: RIGHT SIDE | Performed by: STUDENT IN AN ORGANIZED HEALTH CARE EDUCATION/TRAINING PROGRAM

## 2025-01-21 PROCEDURE — 2500000005 HC RX 250 GENERAL PHARMACY W/O HCPCS: Performed by: NURSE PRACTITIONER

## 2025-01-21 RX ORDER — HYDROMORPHONE HCL/0.9% NACL/PF 15 MG/30ML
PATIENT CONTROLLED ANALGESIA SYRINGE INTRAVENOUS CONTINUOUS
Status: DISCONTINUED | OUTPATIENT
Start: 2025-01-21 | End: 2025-01-23

## 2025-01-21 RX ORDER — DEXTROSE MONOHYDRATE AND SODIUM CHLORIDE 5; .45 G/100ML; G/100ML
75 INJECTION, SOLUTION INTRAVENOUS CONTINUOUS
Status: ACTIVE | OUTPATIENT
Start: 2025-01-21 | End: 2025-01-22

## 2025-01-21 RX ORDER — NALOXONE HYDROCHLORIDE 0.4 MG/ML
0.2 INJECTION, SOLUTION INTRAMUSCULAR; INTRAVENOUS; SUBCUTANEOUS AS NEEDED
Status: DISCONTINUED | OUTPATIENT
Start: 2025-01-21 | End: 2025-01-28 | Stop reason: HOSPADM

## 2025-01-21 RX ADMIN — Medication: at 12:14

## 2025-01-21 RX ADMIN — HYDROMORPHONE HYDROCHLORIDE 4 MG: 2 INJECTION, SOLUTION INTRAMUSCULAR; INTRAVENOUS; SUBCUTANEOUS at 02:59

## 2025-01-21 RX ADMIN — HYDROMORPHONE HYDROCHLORIDE 4 MG: 2 INJECTION, SOLUTION INTRAMUSCULAR; INTRAVENOUS; SUBCUTANEOUS at 10:19

## 2025-01-21 RX ADMIN — Medication 2 L/MIN: at 09:13

## 2025-01-21 RX ADMIN — KETOROLAC TROMETHAMINE 15 MG: 15 INJECTION, SOLUTION INTRAMUSCULAR; INTRAVENOUS at 09:12

## 2025-01-21 RX ADMIN — LIDOCAINE 4% 1 PATCH: 40 PATCH TOPICAL at 09:12

## 2025-01-21 RX ADMIN — KETOROLAC TROMETHAMINE 15 MG: 15 INJECTION, SOLUTION INTRAMUSCULAR; INTRAVENOUS at 18:29

## 2025-01-21 RX ADMIN — HYDROMORPHONE HYDROCHLORIDE 4 MG: 2 INJECTION, SOLUTION INTRAMUSCULAR; INTRAVENOUS; SUBCUTANEOUS at 07:29

## 2025-01-21 RX ADMIN — HYDROMORPHONE HYDROCHLORIDE 4 MG: 2 INJECTION, SOLUTION INTRAMUSCULAR; INTRAVENOUS; SUBCUTANEOUS at 00:51

## 2025-01-21 RX ADMIN — HYDROMORPHONE HYDROCHLORIDE 4 MG: 2 INJECTION, SOLUTION INTRAMUSCULAR; INTRAVENOUS; SUBCUTANEOUS at 05:14

## 2025-01-21 RX ADMIN — DEXTROSE AND SODIUM CHLORIDE 75 ML/HR: 5; 450 INJECTION, SOLUTION INTRAVENOUS at 12:15

## 2025-01-21 RX ADMIN — FOLIC ACID 1 MG: 1 TABLET ORAL at 09:12

## 2025-01-21 RX ADMIN — LIDOCAINE 4% 1 PATCH: 40 PATCH TOPICAL at 09:11

## 2025-01-21 RX ADMIN — Medication: at 18:29

## 2025-01-21 RX ADMIN — KETOROLAC TROMETHAMINE 15 MG: 15 INJECTION, SOLUTION INTRAMUSCULAR; INTRAVENOUS at 01:32

## 2025-01-21 ASSESSMENT — COGNITIVE AND FUNCTIONAL STATUS - GENERAL
MOBILITY SCORE: 24
DAILY ACTIVITIY SCORE: 24
DAILY ACTIVITIY SCORE: 24
MOBILITY SCORE: 24

## 2025-01-21 ASSESSMENT — PAIN SCALES - GENERAL
PAINLEVEL_OUTOF10: 10 - WORST POSSIBLE PAIN
PAINLEVEL_OUTOF10: 8
PAINLEVEL_OUTOF10: 10 - WORST POSSIBLE PAIN
PAINLEVEL_OUTOF10: 8
PAINLEVEL_OUTOF10: 9
PAINLEVEL_OUTOF10: 10 - WORST POSSIBLE PAIN

## 2025-01-21 ASSESSMENT — PAIN - FUNCTIONAL ASSESSMENT
PAIN_FUNCTIONAL_ASSESSMENT: 0-10

## 2025-01-21 ASSESSMENT — PAIN DESCRIPTION - LOCATION: LOCATION: GENERALIZED

## 2025-01-21 NOTE — ASSESSMENT & PLAN NOTE
Joellen Narayan is a 24 y.o. 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 Hgb and lysis labs at baseline. CXR (1/19) without acute process. Admitted for further management on pain. Started on IV dilaudid per care plan. Increased to IV Dilaudid 4mg q2 hrs PRN.  DC pending improvement in pain.     # Update 1/21:   - Started dPCA for uncontrolled pain   - Xray right leg and left shoulder unremarkable, no AVN, may need MRI of leg pain persists   - T-bili elevate, rest of lysis labs not concerning  - Had a low grade fever yesterday     # Fever  - No signs of infection  - Blood cult sent  - UA/urine cult ordered   - ESR <1, CRP ordered   - No indication for abx at this time      # Hgb SS with Pain  - OARRS reviewed, no aberrancies  - No current care path  - Hgb baseline ~8.5; Hgb 10.7 (1/19); no indication for transfusion  - Tbili baseline fluctuates ~5 (planning for cholecystectomy 1/30); Tbili 7.2 (1/19), T.bili elevated to 12.8, direct 1.9   - LDH baseline ~500;  (1/19)  - s/p 1L IV LR in ED 1/19, plan IV hydration with D51/2 X 12 hrs   - On admit started IV dilaudid 3mg q2hrs PRN severe pain, now increased to IV Dilaudid 4mg q2 hrs PRN. Now started on dPCA for uncontrolled pain   - Started IV Toradol 15 mg q6 X 3 days with PPI   - Started Lidocaine patch   - Continue home folic acid 1mg daily  - Pre exchange labs sent 1/19 d/t reported severity of pain  - Hgb S (1/19): pending   - Utox (1/19): positive for cannabinoid   - PO Zofran PRN for opioid-induced nausea and PO Benadryl PRN for opioid-induced pruritus  -  Bowel regimen for opioid-induced constipation with DocuSenna 2tabs BID and Miralax daily  - Monitor need foe exchange if labs and pain worsens, exchange labs sent   - Xray right leg and left shoulder unremarkable, no AVN, may need MRI of leg pain persists       # Hx of Priapism  - Recent admission c/b worsening priapism needing urgent [facility] (9/19)  - Denies recurrent issues with priapism outpt recently  - Continue Sudafed 60mg q8h PRN for priapism      # Recent Choledocholithiasis  # Plan for Cholecystectomy 1/31/25  - s/p ERCP 7/18/24 with a biliary sphincterotomy where sludge and stones were removed, achieving complete clearance  - Seen by gen surg 8/8/24 Dr. Dove who rec lap cholecystectomy d/t the chance of recurrence of choledocholithiasis  - Tbili baseline fluctuates ~5; Tbili 7.2 (1/19)  - No active abd pain on admit; low threshold for CT a/p or RUQ US if abdominal pain occurs  - Planning for cholecystectomy outpt 1/31/25     # Nodular lymphocyte predominant Hodgkins lymphoma (NLPHL)   - Primary Oncologist: Dr. Stoll  - Enlarged lymph nodes noted 4/1/22  - RUQ US (11/14/22) with mildly enlarged LNs in the region of the kavin hepatis  - MRI liver (11/16/22) showed re-demonstration of bulky retroperitoneal lymphadenopathy and kavin hepatic lymphadenopathy    - (11/18/22) lymph node biopsy showed atypical lymphoid infiltrate. Reviewed by Hemepath board, insufficient for lymphoma diagnosis  - PET/CT (12/6/22) showing retroperitoneal lymphadenopathy  - Followed up with Dr. Stoll (12/16/22) with plan for surg/onc consult for large tissue bx but patient missed apt and was lost to follow up  - Requested new apt with Dr. Ronnie Marte 6/19/23, patient was not seen and lost to follow up  - CT a/p (11/28/23) increased size of retroperitoneal lymph nodes reflecting extramedullary hematopoiesis I/s/o sickle cell vs lymphoma  - Paraaortic LN bx via IR (11/30/23) consistent with NLPHL. Flow: no clonal B cell or T cell  "population identified, lymphocyte 95%, CD3+CD4+ 68%, CD3+CD8+ 7%, CD19+ 24%  - Elevated LDH/bili partially from sickle cell disease   - Chemotherapy (R-CHOP) was discussed with primary oncologist Dr. Stoll, and decision was made to simplify his chemotherapy to Rituxan and prednisone q3 weeks mainly due to frequent sickle cell crisis  - Current chemo regimen: Rituxan and prednisone q3 weeks (C1 1/18/24, C2 2/8/24, Missed C3 d/t sickle cell pain crisis, C4 4/4/24, C5 5/16/24, C6 6/7/24)  - Per pt no longer taking Acyclovir 400mg BID prophy   - PET CT (7/25) overall showing great response to treatment with persistent residual viable disease involving a left common iliac node  - PET/CT (11/18) showing Interval increased metabolic activity within upper abdominal and  bilateral inguinal lymph nodes compatible with interval worsening of lymphomatous disease  - Last FUV with Dr. Stoll 11/21, d/w pt and mother regarding tx options vs observation. No current treatment at this time and will continue with observation and plan for repeat CT/PET in 3 months and follow up after that  - Next PET/CT 3/4, Dr. Stoll FUV 3/13     # Hx of nocturnal oxygen dependence and hypoxia on room air  - Pt currently has home 2L supp O2   - Wean as able, encourage incentive spirometry  - Pulm FUV 8/8- \"Given his history of sickle cell disease, it is important to ensure he has formal sleep testing to look at desaturations and presence of sleep apnea despite not having a convincing history/body habitus typical for suspecting sleep apnea- patients with sickle cell have a higher prevalence of sleep disorders including nocturnal hypoxemia\"--> rec sleep referral for formal testing if deemed appropriate, ABG and O2 destat testing, and TTE with bubble study  - TTE 8/23 showing EF 60-65%, severely dilated LV and LA, + intrapulmonary shunting  - Has Pulm FUV 2/13  - Pt currently on 2L supp O2  - Encourage IS     # DVT prophy:   - Lovenox subcutaneous, " SCDs, encourage ambulation     # DISPO:  - Full code, confirmed on admit  - DC pending improvement in pain  - SUSIE Hilla Sylvain (Parent) [phone]  - Cardiology FUV 1/28, Cholecystectomy 1/31, Pulm EPV 2/13, PET/CT 3/4, Dr. Stoll FUV 3/13, Sickle Cell FUV 4/9

## 2025-01-21 NOTE — CARE PLAN
No acute changes my shift, still having increased pain , will continue to monitor       Problem: Pain - Adult  Goal: Verbalizes/displays adequate comfort level or baseline comfort level  Outcome: Progressing     Problem: Discharge Planning  Goal: Discharge to home or other facility with appropriate resources  Outcome: Progressing     Problem: Chronic Conditions and Co-morbidities  Goal: Patient's chronic conditions and co-morbidity symptoms are monitored and maintained or improved  Outcome: Progressing     Problem: Pain  Goal: Walks with improved pain control throughout the shift  Outcome: Progressing     Problem: Pain  Goal: Performs ADL's with improved pain control throughout shift  Outcome: Progressing

## 2025-01-21 NOTE — PROGRESS NOTES
"Joellen Narayan is a 24 y.o. male on day 2 of admission presenting with Sickle cell pain crisis (Multi).    Subjective   Seen this morning at bedside,still c/o severe pain in his left shin and left arm, IV Dilaudid is only helping minimally, discussed changing to dPCA and he agreed. Also discussed doing XR to check for AVN.  Denies chest pain, SOB, N/V, abd pain.     Objective     Physical Exam  Constitutional:       Appearance: Normal appearance.   HENT:      Head: Normocephalic and atraumatic.   Eyes:      Extraocular Movements: Extraocular movements intact.   Cardiovascular:      Rate and Rhythm: Normal rate and regular rhythm.   Pulmonary:      Effort: Pulmonary effort is normal.      Breath sounds: Normal breath sounds.   Abdominal:      General: Abdomen is flat.      Palpations: Abdomen is soft.   Musculoskeletal:         General: Normal range of motion.      Comments: Tenderness to right shin   Skin:     General: Skin is warm and dry.   Neurological:      General: No focal deficit present.      Mental Status: He is alert and oriented to person, place, and time.   Psychiatric:         Mood and Affect: Mood normal.         Behavior: Behavior normal.         Last Recorded Vitals  Blood pressure 125/71, pulse 91, temperature 37.1 °C (98.8 °F), temperature source Temporal, resp. rate 20, height 1.88 m (6' 2\"), weight 72.6 kg (160 lb), SpO2 93%.  Intake/Output last 3 Shifts:  No intake/output data recorded.    Relevant Results  XR chest 2 views  Result Date: 1/19/2025  Cardiomegaly but no evidence of other acute intrathoracic abnormality.   Signed by: Wyatt Hicks 1/19/2025 8:57 AM Dictation workstation:   [id number]     right upper quadrant  Result Date: 12/28/2024  Cholelithiasis without evidence of acute cholecystitis.   Hepatomegaly with hepatic steatosis.   I personally reviewed the images/study and I agree with the findings as stated by resident physician Dr. José Miguel Flowers . This study was " interpreted at [facility], [place], [place]..   MACRO: None   Signed by: Jamshid Enrique 12/28/2024 7:43 AM Dictation workstation:   [id number]    Assessment/Plan   Assessment & Plan  Hodgkin's paragranuloma (Multi)    Sickle cell anemia with pain (Multi)  Joellen Narayan is a 24 y.o. 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 Hgb and lysis labs at baseline. CXR (1/19) without acute process. Admitted for further management on pain. Started on IV dilaudid per care plan. Increased to IV Dilaudid 4mg q2 hrs PRN.  DC pending improvement in pain.     # Update 1/21:   - Started dPCA for uncontrolled pain   - Xray right leg and left shoulder unremarkable, no AVN, may need MRI of leg pain persists   - T-bili elevate, rest of lysis labs not concerning  - Had a low grade fever yesterday     # Fever  - No signs of infection  - Blood cult sent  - UA/urine cult ordered   - ESR <1, CRP ordered   - No indication for abx at this time      # Hgb SS with Pain  - OARRS reviewed, no aberrancies  - No current care path  - Hgb baseline ~8.5; Hgb 10.7 (1/19); no indication for transfusion  - Tbili baseline fluctuates ~5 (planning for cholecystectomy 1/30); Tbili 7.2 (1/19), T.bili elevated to 12.8, direct 1.9   - LDH baseline ~500;  (1/19)  - s/p 1L IV LR in ED 1/19, plan IV hydration with D51/2 X 12 hrs   - On admit started IV dilaudid 3mg q2hrs PRN severe pain, now increased to IV Dilaudid 4mg q2 hrs PRN. Now started on dPCA for uncontrolled pain   - Started IV Toradol 15 mg q6 X 3 days with  PPI   - Started Lidocaine patch   - Continue home folic acid 1mg daily  - Pre exchange labs sent 1/19 d/t reported severity of pain  - Hgb S (1/19): pending   - Utox (1/19): positive for cannabinoid   - PO Zofran PRN for opioid-induced nausea and PO Benadryl PRN for opioid-induced pruritus  - Bowel regimen for opioid-induced constipation with DocuSenna 2tabs BID and Miralax daily  - Monitor need foe exchange if labs and pain worsens, exchange labs sent   - Xray right leg and left shoulder unremarkable, no AVN, may need MRI of leg pain persists       # Hx of Priapism  - Recent admission c/b worsening priapism needing urgent [facility] (9/19)  - Denies recurrent issues with priapism outpt recently  - Continue Sudafed 60mg q8h PRN for priapism      # Recent Choledocholithiasis  # Plan for Cholecystectomy 1/31/25  - s/p ERCP 7/18/24 with a biliary sphincterotomy where sludge and stones were removed, achieving complete clearance  - Seen by gen surg 8/8/24 Dr. Dove who rec lap cholecystectomy d/t the chance of recurrence of choledocholithiasis  - Tbili baseline fluctuates ~5; Tbili 7.2 (1/19)  - No active abd pain on admit; low threshold for CT a/p or RUQ US if abdominal pain occurs  - Planning for cholecystectomy outpt 1/31/25     # Nodular lymphocyte predominant Hodgkins lymphoma (NLPHL)   - Primary Oncologist: Dr. Stoll  - Enlarged lymph nodes noted 4/1/22  - RUQ US (11/14/22) with mildly enlarged LNs in the region of the kavin hepatis  - MRI liver (11/16/22) showed re-demonstration of bulky retroperitoneal lymphadenopathy and kavin hepatic lymphadenopathy    - (11/18/22) lymph node biopsy showed atypical lymphoid infiltrate. Reviewed by Hemepath board, insufficient for lymphoma diagnosis  - PET/CT (12/6/22) showing retroperitoneal lymphadenopathy  - Followed up with Dr. Stoll (12/16/22) with plan for surg/onc consult for large tissue bx but patient missed apt and was lost to follow up  - Requested new apt with  "Dr. Ronnie Marte 6/19/23, patient was not seen and lost to follow up  - CT a/p (11/28/23) increased size of retroperitoneal lymph nodes reflecting extramedullary hematopoiesis I/s/o sickle cell vs lymphoma  - Paraaortic LN bx via IR (11/30/23) consistent with NLPHL. Flow: no clonal B cell or T cell population identified, lymphocyte 95%, CD3+CD4+ 68%, CD3+CD8+ 7%, CD19+ 24%  - Elevated LDH/bili partially from sickle cell disease   - Chemotherapy (R-CHOP) was discussed with primary oncologist Dr. Stoll, and decision was made to simplify his chemotherapy to Rituxan and prednisone q3 weeks mainly due to frequent sickle cell crisis  - Current chemo regimen: Rituxan and prednisone q3 weeks (C1 1/18/24, C2 2/8/24, Missed C3 d/t sickle cell pain crisis, C4 4/4/24, C5 5/16/24, C6 6/7/24)  - Per pt no longer taking Acyclovir 400mg BID prophy   - PET CT (7/25) overall showing great response to treatment with persistent residual viable disease involving a left common iliac node  - PET/CT (11/18) showing Interval increased metabolic activity within upper abdominal and  bilateral inguinal lymph nodes compatible with interval worsening of lymphomatous disease  - Last FUV with Dr. Stoll 11/21, d/w pt and mother regarding tx options vs observation. No current treatment at this time and will continue with observation and plan for repeat CT/PET in 3 months and follow up after that  - Next PET/CT 3/4, Dr. Stoll FUV 3/13     # Hx of nocturnal oxygen dependence and hypoxia on room air  - Pt currently has home 2L supp O2   - Wean as able, encourage incentive spirometry  - Pulm FUV 8/8- \"Given his history of sickle cell disease, it is important to ensure he has formal sleep testing to look at desaturations and presence of sleep apnea despite not having a convincing history/body habitus typical for suspecting sleep apnea- patients with sickle cell have a higher prevalence of sleep disorders including nocturnal hypoxemia\"--> rec sleep " referral for formal testing if deemed appropriate, ABG and O2 destat testing, and TTE with bubble study  - TTE 8/23 showing EF 60-65%, severely dilated LV and LA, + intrapulmonary shunting  - Has Pulm FUV 2/13  - Pt currently on 2L supp O2  - Encourage IS     # DVT prophy:   - Lovenox subcutaneous, SCDs, encourage ambulation     # DISPO:  - Full code, confirmed on admit  - DC pending improvement in pain  - SUSIE Narayan (Parent) [phone]  - Cardiology FUV 1/28, Cholecystectomy 1/31, Pulm EPV 2/13, PET/CT 3/4, Dr. Stoll FUV 3/13, Sickle Cell FUV 4/9         I spent 60 minutes in the professional and overall care of this patient.      CORINA AgudeloC

## 2025-01-21 NOTE — CARE PLAN
The patient's goals for the shift include Rest and Comfort    The clinical goals for the shift include pt will report a decrease in pain by end of shift      Problem: Pain - Adult  Goal: Verbalizes/displays adequate comfort level or baseline comfort level  Outcome: Progressing     Problem: Safety - Adult  Goal: Free from fall injury  Outcome: Progressing     Problem: Discharge Planning  Goal: Discharge to home or other facility with appropriate resources  Outcome: Progressing     Problem: Chronic Conditions and Co-morbidities  Goal: Patient's chronic conditions and co-morbidity symptoms are monitored and maintained or improved  Outcome: Progressing     Problem: Pain  Goal: Takes deep breaths with improved pain control throughout the shift  Outcome: Progressing  Goal: Turns in bed with improved pain control throughout the shift  Outcome: Progressing  Goal: Walks with improved pain control throughout the shift  Outcome: Progressing  Goal: Performs ADL's with improved pain control throughout shift  Outcome: Progressing  Goal: Participates in PT with improved pain control throughout the shift  Outcome: Progressing  Goal: Free from opioid side effects throughout the shift  Outcome: Progressing  Goal: Free from acute confusion related to pain meds throughout the shift  Outcome: Progressing

## 2025-01-21 NOTE — NURSING NOTE
Pt asked for pain med. RN explained that his next dose of pain med is due at 0714. RN offered hot packs for the patient. Patient said that he wanted two hot packs. RN provided pt with 2 hot packs.

## 2025-01-22 ENCOUNTER — APPOINTMENT (OUTPATIENT)
Dept: RADIOLOGY | Facility: HOSPITAL | Age: 25
DRG: 812 | End: 2025-01-22
Payer: COMMERCIAL

## 2025-01-22 LAB
1OH-MIDAZOLAM UR CFM-MCNC: <25 NG/ML
6MAM UR CFM-MCNC: <25 NG/ML
7AMINOCLONAZEPAM UR CFM-MCNC: <25 NG/ML
A-OH ALPRAZ UR CFM-MCNC: <25 NG/ML
ALBUMIN SERPL BCP-MCNC: 4.4 G/DL (ref 3.4–5)
ALP SERPL-CCNC: 154 U/L (ref 33–120)
ALPRAZ UR CFM-MCNC: <25 NG/ML
ALT SERPL W P-5'-P-CCNC: 15 U/L (ref 10–52)
ANION GAP SERPL CALC-SCNC: 11 MMOL/L (ref 10–20)
AST SERPL W P-5'-P-CCNC: 40 U/L (ref 9–39)
BASOPHILS # BLD AUTO: 0.04 X10*3/UL (ref 0–0.1)
BASOPHILS NFR BLD AUTO: 0.4 %
BILIRUB SERPL-MCNC: 13.8 MG/DL (ref 0–1.2)
BUN SERPL-MCNC: 6 MG/DL (ref 6–23)
CALCIUM SERPL-MCNC: 9.5 MG/DL (ref 8.6–10.6)
CARBOXYTHC UR-MCNC: >500 NG/ML
CHLORDIAZEP UR CFM-MCNC: <25 NG/ML
CHLORIDE SERPL-SCNC: 102 MMOL/L (ref 98–107)
CLONAZEPAM UR CFM-MCNC: <25 NG/ML
CO2 SERPL-SCNC: 29 MMOL/L (ref 21–32)
CODEINE UR CFM-MCNC: <50 NG/ML
CREAT SERPL-MCNC: 0.56 MG/DL (ref 0.5–1.3)
CRP SERPL-MCNC: 12.66 MG/DL
DIAZEPAM UR CFM-MCNC: <25 NG/ML
EDDP UR CFM-MCNC: <25 NG/ML
EGFRCR SERPLBLD CKD-EPI 2021: >90 ML/MIN/1.73M*2
EOSINOPHIL # BLD AUTO: 0.6 X10*3/UL (ref 0–0.7)
EOSINOPHIL NFR BLD AUTO: 5.6 %
ERYTHROCYTE [DISTWIDTH] IN BLOOD BY AUTOMATED COUNT: 22.5 % (ref 11.5–14.5)
FENTANYL UR CFM-MCNC: <2.5 NG/ML
GLUCOSE SERPL-MCNC: 106 MG/DL (ref 74–99)
HCT VFR BLD AUTO: 26.3 % (ref 41–52)
HGB BLD-MCNC: 9.1 G/DL (ref 13.5–17.5)
HGB RETIC QN: 29 PG (ref 28–38)
HOLD SPECIMEN: NORMAL
HOWELL-JOLLY BOD BLD QL SMEAR: PRESENT
HYDROCODONE CTO UR CFM-MCNC: <25 NG/ML
HYDROMORPHONE UR CFM-MCNC: 2430 NG/ML
IMM GRANULOCYTES # BLD AUTO: 0.14 X10*3/UL (ref 0–0.7)
IMM GRANULOCYTES NFR BLD AUTO: 1.3 % (ref 0–0.9)
IMMATURE RETIC FRACTION: 21.6 %
LDH SERPL L TO P-CCNC: 450 U/L (ref 84–246)
LORAZEPAM UR CFM-MCNC: <25 NG/ML
LYMPHOCYTES # BLD AUTO: 1.87 X10*3/UL (ref 1.2–4.8)
LYMPHOCYTES NFR BLD AUTO: 17.5 %
MCH RBC QN AUTO: 28.7 PG (ref 26–34)
MCHC RBC AUTO-ENTMCNC: 34.6 G/DL (ref 32–36)
MCV RBC AUTO: 83 FL (ref 80–100)
METHADONE UR CFM-MCNC: <25 NG/ML
MIDAZOLAM UR CFM-MCNC: <25 NG/ML
MONOCYTES # BLD AUTO: 1.35 X10*3/UL (ref 0.1–1)
MONOCYTES NFR BLD AUTO: 12.7 %
MORPHINE UR CFM-MCNC: >2500 NG/ML
NEUTROPHILS # BLD AUTO: 6.67 X10*3/UL (ref 1.2–7.7)
NEUTROPHILS NFR BLD AUTO: 62.5 %
NORDIAZEPAM UR CFM-MCNC: <25 NG/ML
NORFENTANYL UR CFM-MCNC: <2.5 NG/ML
NORHYDROCODONE UR CFM-MCNC: <25 NG/ML
NOROXYCODONE UR CFM-MCNC: 32 NG/ML
NORTRAMADOL UR-MCNC: <50 NG/ML
NRBC BLD-RTO: 1.9 /100 WBCS (ref 0–0)
OXAZEPAM UR CFM-MCNC: <25 NG/ML
OXYCODONE UR CFM-MCNC: <25 NG/ML
OXYMORPHONE UR CFM-MCNC: <25 NG/ML
PAPPENHEIMER BOD BLD QL SMEAR: PRESENT
PLATELET # BLD AUTO: 188 X10*3/UL (ref 150–450)
POLYCHROMASIA BLD QL SMEAR: NORMAL
POTASSIUM SERPL-SCNC: 3.5 MMOL/L (ref 3.5–5.3)
PROT SERPL-MCNC: 6.7 G/DL (ref 6.4–8.2)
RBC # BLD AUTO: 3.17 X10*6/UL (ref 4.5–5.9)
RBC MORPH BLD: NORMAL
RETICS #: 0.57 X10*6/UL (ref 0.02–0.12)
RETICS/RBC NFR AUTO: 18 % (ref 0.5–2)
SICKLE CELLS BLD QL SMEAR: NORMAL
SODIUM SERPL-SCNC: 138 MMOL/L (ref 136–145)
TARGETS BLD QL SMEAR: NORMAL
TEMAZEPAM UR CFM-MCNC: <25 NG/ML
TRAMADOL UR CFM-MCNC: <50 NG/ML
WBC # BLD AUTO: 10.7 X10*3/UL (ref 4.4–11.3)
ZOLPIDEM UR CFM-MCNC: <25 NG/ML
ZOLPIDEM UR-MCNC: <25 NG/ML

## 2025-01-22 PROCEDURE — 85045 AUTOMATED RETICULOCYTE COUNT: CPT | Performed by: NURSE PRACTITIONER

## 2025-01-22 PROCEDURE — 2500000005 HC RX 250 GENERAL PHARMACY W/O HCPCS: Performed by: PHYSICIAN ASSISTANT

## 2025-01-22 PROCEDURE — 99233 SBSQ HOSP IP/OBS HIGH 50: CPT

## 2025-01-22 PROCEDURE — 2500000004 HC RX 250 GENERAL PHARMACY W/ HCPCS (ALT 636 FOR OP/ED)

## 2025-01-22 PROCEDURE — 73718 MRI LOWER EXTREMITY W/O DYE: CPT | Mod: RT

## 2025-01-22 PROCEDURE — 1170000001 HC PRIVATE ONCOLOGY ROOM DAILY

## 2025-01-22 PROCEDURE — 86140 C-REACTIVE PROTEIN: CPT | Performed by: PHYSICIAN ASSISTANT

## 2025-01-22 PROCEDURE — 83615 LACTATE (LD) (LDH) ENZYME: CPT | Performed by: NURSE PRACTITIONER

## 2025-01-22 PROCEDURE — 2500000004 HC RX 250 GENERAL PHARMACY W/ HCPCS (ALT 636 FOR OP/ED): Performed by: NURSE PRACTITIONER

## 2025-01-22 PROCEDURE — 80053 COMPREHEN METABOLIC PANEL: CPT | Performed by: NURSE PRACTITIONER

## 2025-01-22 PROCEDURE — 36415 COLL VENOUS BLD VENIPUNCTURE: CPT | Performed by: PHYSICIAN ASSISTANT

## 2025-01-22 PROCEDURE — 73718 MRI LOWER EXTREMITY W/O DYE: CPT | Mod: RIGHT SIDE | Performed by: RADIOLOGY

## 2025-01-22 PROCEDURE — 2500000001 HC RX 250 WO HCPCS SELF ADMINISTERED DRUGS (ALT 637 FOR MEDICARE OP): Performed by: NURSE PRACTITIONER

## 2025-01-22 PROCEDURE — 73221 MRI JOINT UPR EXTREM W/O DYE: CPT | Mod: LT

## 2025-01-22 PROCEDURE — 2500000004 HC RX 250 GENERAL PHARMACY W/ HCPCS (ALT 636 FOR OP/ED): Performed by: PHYSICIAN ASSISTANT

## 2025-01-22 PROCEDURE — 2500000005 HC RX 250 GENERAL PHARMACY W/O HCPCS: Performed by: NURSE PRACTITIONER

## 2025-01-22 PROCEDURE — 2500000005 HC RX 250 GENERAL PHARMACY W/O HCPCS

## 2025-01-22 PROCEDURE — 85025 COMPLETE CBC W/AUTO DIFF WBC: CPT | Performed by: NURSE PRACTITIONER

## 2025-01-22 PROCEDURE — 73221 MRI JOINT UPR EXTREM W/O DYE: CPT | Mod: LEFT SIDE | Performed by: RADIOLOGY

## 2025-01-22 RX ORDER — HYDROMORPHONE HYDROCHLORIDE 2 MG/ML
2 INJECTION, SOLUTION INTRAMUSCULAR; INTRAVENOUS; SUBCUTANEOUS ONCE
Status: COMPLETED | OUTPATIENT
Start: 2025-01-22 | End: 2025-01-22

## 2025-01-22 RX ORDER — HYDROCORTISONE 1 %
CREAM (GRAM) TOPICAL 2 TIMES DAILY
Status: DISCONTINUED | OUTPATIENT
Start: 2025-01-22 | End: 2025-01-28 | Stop reason: HOSPADM

## 2025-01-22 RX ORDER — HYDROMORPHONE HYDROCHLORIDE 1 MG/ML
1 INJECTION, SOLUTION INTRAMUSCULAR; INTRAVENOUS; SUBCUTANEOUS ONCE
Status: COMPLETED | OUTPATIENT
Start: 2025-01-22 | End: 2025-01-22

## 2025-01-22 RX ORDER — DEXTROSE MONOHYDRATE AND SODIUM CHLORIDE 5; .45 G/100ML; G/100ML
75 INJECTION, SOLUTION INTRAVENOUS CONTINUOUS
Status: ACTIVE | OUTPATIENT
Start: 2025-01-22 | End: 2025-01-23

## 2025-01-22 RX ADMIN — Medication: at 00:45

## 2025-01-22 RX ADMIN — KETOROLAC TROMETHAMINE 15 MG: 15 INJECTION, SOLUTION INTRAMUSCULAR; INTRAVENOUS at 03:04

## 2025-01-22 RX ADMIN — HYDROMORPHONE HYDROCHLORIDE 1 MG: 1 INJECTION, SOLUTION INTRAMUSCULAR; INTRAVENOUS; SUBCUTANEOUS at 18:36

## 2025-01-22 RX ADMIN — HYDROCORTISONE: 1 CREAM TOPICAL at 22:51

## 2025-01-22 RX ADMIN — Medication: at 21:37

## 2025-01-22 RX ADMIN — LIDOCAINE 4% 1 PATCH: 40 PATCH TOPICAL at 09:32

## 2025-01-22 RX ADMIN — HYDROMORPHONE HYDROCHLORIDE 2 MG: 2 INJECTION INTRAMUSCULAR; INTRAVENOUS; SUBCUTANEOUS at 15:33

## 2025-01-22 RX ADMIN — Medication: at 08:03

## 2025-01-22 RX ADMIN — POLYETHYLENE GLYCOL 3350 17 G: 17 POWDER, FOR SOLUTION ORAL at 09:20

## 2025-01-22 RX ADMIN — Medication 2 L/MIN: at 09:32

## 2025-01-22 RX ADMIN — HYDROMORPHONE HYDROCHLORIDE 0.5 MG: 1 INJECTION, SOLUTION INTRAMUSCULAR; INTRAVENOUS; SUBCUTANEOUS at 20:46

## 2025-01-22 RX ADMIN — HYDROMORPHONE HYDROCHLORIDE 0.5 MG: 0.5 INJECTION, SOLUTION INTRAMUSCULAR; INTRAVENOUS; SUBCUTANEOUS at 22:51

## 2025-01-22 RX ADMIN — DEXTROSE AND SODIUM CHLORIDE 75 ML/HR: 5; 450 INJECTION, SOLUTION INTRAVENOUS at 13:32

## 2025-01-22 RX ADMIN — FOLIC ACID 1 MG: 1 TABLET ORAL at 09:20

## 2025-01-22 RX ADMIN — KETOROLAC TROMETHAMINE 15 MG: 15 INJECTION, SOLUTION INTRAMUSCULAR; INTRAVENOUS at 09:20

## 2025-01-22 RX ADMIN — SENNOSIDES AND DOCUSATE SODIUM 2 TABLET: 50; 8.6 TABLET ORAL at 09:20

## 2025-01-22 RX ADMIN — LIDOCAINE 4% 1 PATCH: 40 PATCH TOPICAL at 09:20

## 2025-01-22 RX ADMIN — Medication: at 13:32

## 2025-01-22 ASSESSMENT — COGNITIVE AND FUNCTIONAL STATUS - GENERAL
MOBILITY SCORE: 24
DAILY ACTIVITIY SCORE: 24
DAILY ACTIVITIY SCORE: 24
MOBILITY SCORE: 24

## 2025-01-22 ASSESSMENT — PAIN SCALES - GENERAL
PAINLEVEL_OUTOF10: 10 - WORST POSSIBLE PAIN
PAINLEVEL_OUTOF10: 8
PAINLEVEL_OUTOF10: 9
PAINLEVEL_OUTOF10: 8
PAINLEVEL_OUTOF10: 10 - WORST POSSIBLE PAIN
PAINLEVEL_OUTOF10: 8

## 2025-01-22 ASSESSMENT — PAIN DESCRIPTION - LOCATION: LOCATION: GENERALIZED

## 2025-01-22 ASSESSMENT — PAIN SCALES - PAIN ASSESSMENT IN ADVANCED DEMENTIA (PAINAD): TOTALSCORE: MEDICATION (SEE MAR);HEAT APPLIED;COLD APPLIED

## 2025-01-22 ASSESSMENT — ACTIVITIES OF DAILY LIVING (ADL): LACK_OF_TRANSPORTATION: NO

## 2025-01-22 ASSESSMENT — PAIN - FUNCTIONAL ASSESSMENT
PAIN_FUNCTIONAL_ASSESSMENT: 0-10
PAIN_FUNCTIONAL_ASSESSMENT: 0-10

## 2025-01-22 NOTE — PROGRESS NOTES
"Joellen Narayan is a 24 y.o. male on day 3 of admission presenting with Sickle cell pain crisis (Multi).    Subjective   Seen this morning at bedside. Pain is improved overnight since addition of dPCA. Discussed keeping PCA today for pain management. Discussed XR results. Discussed uptrending bilirubin, denies abdominal pain but slight tenderness to RLQ on exam. LBM 1/21. Urinating and drinking okay. Denies chest pain, SOB, N/V, abd pain.     Objective     Physical Exam  Constitutional:       Appearance: Normal appearance.   HENT:      Head: Normocephalic and atraumatic.   Eyes:      Extraocular Movements: Extraocular movements intact.   Cardiovascular:      Rate and Rhythm: Normal rate and regular rhythm.   Pulmonary:      Effort: Pulmonary effort is normal.      Breath sounds: Normal breath sounds.   Abdominal:      General: Abdomen is flat.      Palpations: Abdomen is soft.      Tenderness: There is abdominal tenderness (RLQ).   Musculoskeletal:         General: Normal range of motion.      Comments: Tenderness to right shin   Skin:     General: Skin is warm and dry.   Neurological:      General: No focal deficit present.      Mental Status: He is alert and oriented to person, place, and time.   Psychiatric:         Mood and Affect: Mood normal.         Behavior: Behavior normal.         Last Recorded Vitals  Blood pressure 137/73, pulse 85, temperature 36.8 °C (98.2 °F), temperature source Temporal, resp. rate 16, height 1.88 m (6' 2\"), weight 72.6 kg (160 lb), SpO2 93%.  Intake/Output last 3 Shifts:  I/O last 3 completed shifts:  In: 502.5 (6.9 mL/kg) [I.V.:502.5 (6.9 mL/kg)]  Out: 525 (7.2 mL/kg) [Urine:525 (0.2 mL/kg/hr)]  Weight: 72.6 kg     Relevant Results  XR chest 2 views  Result Date: 1/19/2025  Cardiomegaly but no evidence of other acute intrathoracic abnormality.   Signed by: Wyatt Hicks 1/19/2025 8:57 AM Dictation workstation:   [id number]     right upper quadrant  Result Date: " 12/28/2024  Cholelithiasis without evidence of acute cholecystitis.   Hepatomegaly with hepatic steatosis.   I personally reviewed the images/study and I agree with the findings as stated by resident physician Dr. José Miguel Flowers . This study was interpreted at [facility], [place], [place]..   MACRO: None   Signed by: Jamshid Enrique 12/28/2024 7:43 AM Dictation workstation:   [id number]    Assessment/Plan   Assessment & Plan  Hodgkin's paragranuloma (Multi)    Sickle cell anemia with pain (Multi)    Joellen Narayan is a 24 y.o. 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 Hgb and lysis labs at baseline. CXR (1/19) without acute process. Admitted for further management on pain. Started on IV dilaudid per care plan and transitioned to dPCA pump on 1/21 for further pain control. Xray right leg and left shoulder unremarkable, no AVN (1/21). Bilirubin and lysis labs worsening (1/21), CRP elevated. MRI left shoulder and right tib/fib pending to r/o osteomyelitis. DC pending improvement in pain and MRI.      # Update 1/22:   - Cont dPCA for pain   - T-bili elevated, lysis labs slightly worse   - Ordered MRI left should and MRI right tib/fib to rule out osteonecrosis vs osteomyelitis given worsening lysis labs and elevated CRP     # Fever  - No signs of infection, last low grade fever 1/20   - Blood cultures (1/21) NGTD   - UA/urine cult negative 1/21   - ESR <1, CRP 13.31 (1/21)   - 1/21 Xray right leg and left shoulder unremarkable, no AVN, may need MRI of leg pain persists  - No  indication for abx at this time      # Hgb SS with Pain  - OARRS reviewed, no aberrancies  - No current care path  - Hgb baseline ~8.5; Hgb 10.7 (1/19); no indication for transfusion  - Tbili baseline fluctuates ~5 (planning for cholecystectomy 1/30); Tbili 7.2 (1/19), T.bili elevated to 12.8, direct 1.9   - LDH baseline ~500;  (1/19)  - s/p 1L IV LR in ED 1/19, plan IV hydration with D51/2 X 12 hrs   - s/p IV dilaudid 3mg q2hrs PRN severe pain, now increased to IV Dilaudid 4mg q2 hrs PRN.  - started on dPCA for uncontrolled pain (1/21)  - Started IV Toradol 15 mg q6 X 3 days with PPI (1/19)   - Started Lidocaine patch   - Continue home folic acid 1mg daily  - Pre exchange labs sent 1/19 d/t reported severity of pain  - Hgb S (1/19): 58.6%   - Utox (1/19): positive for cannabinoid   - PO Zofran PRN for opioid-induced nausea and PO Benadryl PRN for opioid-induced pruritus  - Bowel regimen for opioid-induced constipation with DocuSenna 2tabs BID and Miralax daily  - Monitor need for exchange if labs and pain worsens, exchange labs sent 1/21   - 1/21 Xray right leg and left shoulder unremarkable, no AVN, may need MRI of leg pain persists    - CRP 13.31 (1/21) --> 12.66 (1/22), sed rate negative (1/21)  - 1/22 reporting improvement in pain but since lysis labs are worsening will order MRI left should and MRI right tib/fib to rule out osteonecrosis vs osteomyelitis pending given worsening lysis labs and elevated CRP      # Hx of Priapism  - Recent admission c/b worsening priapism needing urgent [facility] (9/19)  - Denies recurrent issues with priapism outpt recently  - Continue Sudafed 60mg q8h PRN for priapism      # Recent Choledocholithiasis  # Plan for Cholecystectomy 1/31/25  - s/p ERCP 7/18/24 with a biliary sphincterotomy where sludge and stones were removed, achieving complete clearance  - Seen by gen surg 8/8/24 Dr. Dove who rec lap cholecystectomy d/t the chance of recurrence of choledocholithiasis  -  Tbili baseline fluctuates ~5; Tbili 7.2 (1/19) --> 13.8 (1/22)   - direct bili 1.9 (1/21)   - No active abd pain on admit; low threshold for CT a/p or RUQ US if abdominal pain occurs  - Planning for cholecystectomy outpt 1/31/25     # Nodular lymphocyte predominant Hodgkins lymphoma (NLPHL)   - Primary Oncologist: Dr. Stoll  - Enlarged lymph nodes noted 4/1/22  - RUQ US (11/14/22) with mildly enlarged LNs in the region of the kavin hepatis  - MRI liver (11/16/22) showed re-demonstration of bulky retroperitoneal lymphadenopathy and kavin hepatic lymphadenopathy    - (11/18/22) lymph node biopsy showed atypical lymphoid infiltrate. Reviewed by Hemepath board, insufficient for lymphoma diagnosis  - PET/CT (12/6/22) showing retroperitoneal lymphadenopathy  - Followed up with Dr. Stoll (12/16/22) with plan for surg/onc consult for large tissue bx but patient missed apt and was lost to follow up  - Requested new apt with Dr. Ronnie Marte 6/19/23, patient was not seen and lost to follow up  - CT a/p (11/28/23) increased size of retroperitoneal lymph nodes reflecting extramedullary hematopoiesis I/s/o sickle cell vs lymphoma  - Paraaortic LN bx via IR (11/30/23) consistent with NLPHL. Flow: no clonal B cell or T cell population identified, lymphocyte 95%, CD3+CD4+ 68%, CD3+CD8+ 7%, CD19+ 24%  - Elevated LDH/bili partially from sickle cell disease   - Chemotherapy (R-CHOP) was discussed with primary oncologist Dr. Stoll, and decision was made to simplify his chemotherapy to Rituxan and prednisone q3 weeks mainly due to frequent sickle cell crisis  - Current chemo regimen: Rituxan and prednisone q3 weeks (C1 1/18/24, C2 2/8/24, Missed C3 d/t sickle cell pain crisis, C4 4/4/24, C5 5/16/24, C6 6/7/24)  - Per pt no longer taking Acyclovir 400mg BID prophy   - PET CT (7/25) overall showing great response to treatment with persistent residual viable disease involving a left common iliac node  - PET/CT (11/18) showing Interval  "increased metabolic activity within upper abdominal and  bilateral inguinal lymph nodes compatible with interval worsening of lymphomatous disease  - Last FUV with Dr. Stoll 11/21, d/w pt and mother regarding tx options vs observation. No current treatment at this time and will continue with observation and plan for repeat CT/PET in 3 months and follow up after that  - Next PET/CT 3/4, Dr. Stoll FUV 3/13     # Hx of nocturnal oxygen dependence and hypoxia on room air  - Pt currently has home 2L supp O2   - Wean as able, encourage incentive spirometry  - Pulm FUV 8/8- \"Given his history of sickle cell disease, it is important to ensure he has formal sleep testing to look at desaturations and presence of sleep apnea despite not having a convincing history/body habitus typical for suspecting sleep apnea- patients with sickle cell have a higher prevalence of sleep disorders including nocturnal hypoxemia\"--> rec sleep referral for formal testing if deemed appropriate, ABG and O2 destat testing, and TTE with bubble study  - TTE 8/23 showing EF 60-65%, severely dilated LV and LA, + intrapulmonary shunting  - Has Pulm FUV 2/13  - Pt currently on 2L supp O2  - Encourage IS     # DVT prophy:   - Lovenox subcutaneous, SCDs, encourage ambulation     # DISPO:  - Full code, confirmed on admit  - DC pending improvement in pain and MRI   - SUSIE Narayan (Parent) [phone]  - Cardiology FUV 1/28, Cholecystectomy 1/31, Pulm EPV 2/13, PET/CT 3/4, Dr. Stoll FUV 3/13, Sickle Cell FUV 4/9    I spent 60 minutes in the professional and overall care of this patient.    Assessment and plan as above discussed with attending physician Dr. Bren Arana, APRN-CNP  "

## 2025-01-22 NOTE — CARE PLAN
The patient's goals for the shift include Rest and Comfort    The clinical goals for the shift include pt will remain HDS and VSS throughout shift 1/22/25 by 0700.      Problem: Pain - Adult  Goal: Verbalizes/displays adequate comfort level or baseline comfort level  Outcome: Progressing     Problem: Safety - Adult  Goal: Free from fall injury  Outcome: Progressing     Problem: Discharge Planning  Goal: Discharge to home or other facility with appropriate resources  Outcome: Progressing     Problem: Chronic Conditions and Co-morbidities  Goal: Patient's chronic conditions and co-morbidity symptoms are monitored and maintained or improved  Outcome: Progressing     Problem: Pain  Goal: Takes deep breaths with improved pain control throughout the shift  Outcome: Progressing  Goal: Turns in bed with improved pain control throughout the shift  Outcome: Progressing  Goal: Walks with improved pain control throughout the shift  Outcome: Progressing  Goal: Performs ADL's with improved pain control throughout shift  Outcome: Progressing  Goal: Participates in PT with improved pain control throughout the shift  Outcome: Progressing  Goal: Free from opioid side effects throughout the shift  Outcome: Progressing  Goal: Free from acute confusion related to pain meds throughout the shift  Outcome: Progressing

## 2025-01-22 NOTE — PROGRESS NOTES
01/22/25 1500   Discharge Planning   Living Arrangements Parent   Support Systems Parent   Type of Residence Private residence   Number of Stairs to Enter Residence 5   Number of Stairs Within Residence 0   Do you have animals or pets at home? No   Who is requesting discharge planning? Provider   Home or Post Acute Services None   Expected Discharge Disposition Home   Financial Resource Strain   How hard is it for you to pay for the very basics like food, housing, medical care, and heating? Not very   Housing Stability   In the last 12 months, was there a time when you were not able to pay the mortgage or rent on time? N   In the past 12 months, how many times have you moved where you were living? 0   At any time in the past 12 months, were you homeless or living in a shelter (including now)? N   Transportation Needs   In the past 12 months, has lack of transportation kept you from medical appointments or from getting medications? no   In the past 12 months, has lack of transportation kept you from meetings, work, or from getting things needed for daily living? No     Care Transitions Note    Plan per Medical/Surgical Team: Admitted for Sickle cell pain crisis   Status: Inpatient  Payor Source: [place]   Discharge disposition: Home  Expected date of discharge:  Barriers:  PCP: Ian Cruz MD  Preferred Pharmacy: [facility]  Preferred home care agency:    SW met with pt and pts mother to complete admission assessment.  Demographics and insurance verifed with patient. Pt lives with his mother in his mothers ranch style home.  There are 5 steps to enter.  Pt has no pets.  Pt works part time/prn.  Pt receives SS.  Pt has home oxygen, but does not recall the name of the provider.  Pt denies recent falls and feels safe at home.  No discharge needs identified at this time.  Please consult ANNA if discharge needs arise.  Elizabeth Gunsalus LISW-S  Care Transitions Supervisor

## 2025-01-22 NOTE — CARE PLAN
Pain controlled better today with PCA, he was up walking the hallway twice today &  got an MRI of left shoulder and R leg - will continue to monitor     The clinical goals for the shift include pt will have better pain control today during my shift      Problem: Safety - Adult  Goal: Free from fall injury  Outcome: Progressing     Problem: Pain - Adult  Goal: Verbalizes/displays adequate comfort level or baseline comfort level  Outcome: Progressing     Problem: Chronic Conditions and Co-morbidities  Goal: Patient's chronic conditions and co-morbidity symptoms are monitored and maintained or improved  Outcome: Progressing     Problem: Pain  Goal: Free from opioid side effects throughout the shift  Outcome: Progressing     Problem: Pain  Goal: Turns in bed with improved pain control throughout the shift  Outcome: Progressing

## 2025-01-23 ENCOUNTER — APPOINTMENT (OUTPATIENT)
Dept: RADIOLOGY | Facility: HOSPITAL | Age: 25
DRG: 812 | End: 2025-01-23
Payer: COMMERCIAL

## 2025-01-23 LAB
ALBUMIN SERPL BCP-MCNC: 4.7 G/DL (ref 3.4–5)
ALP SERPL-CCNC: 183 U/L (ref 33–120)
ALT SERPL W P-5'-P-CCNC: 27 U/L (ref 10–52)
ANION GAP SERPL CALC-SCNC: 13 MMOL/L (ref 10–20)
AST SERPL W P-5'-P-CCNC: 47 U/L (ref 9–39)
BASOPHILS # BLD MANUAL: 0 X10*3/UL (ref 0–0.1)
BASOPHILS NFR BLD MANUAL: 0 %
BILIRUB SERPL-MCNC: 12.7 MG/DL (ref 0–1.2)
BUN SERPL-MCNC: 5 MG/DL (ref 6–23)
CALCIUM SERPL-MCNC: 9.9 MG/DL (ref 8.6–10.6)
CHLORIDE SERPL-SCNC: 100 MMOL/L (ref 98–107)
CK SERPL-CCNC: 36 U/L (ref 0–325)
CO2 SERPL-SCNC: 26 MMOL/L (ref 21–32)
CREAT SERPL-MCNC: 0.56 MG/DL (ref 0.5–1.3)
CRP SERPL-MCNC: 12.62 MG/DL
EGFRCR SERPLBLD CKD-EPI 2021: >90 ML/MIN/1.73M*2
EOSINOPHIL # BLD MANUAL: 0.19 X10*3/UL (ref 0–0.7)
EOSINOPHIL NFR BLD MANUAL: 1.7 %
ERYTHROCYTE [DISTWIDTH] IN BLOOD BY AUTOMATED COUNT: 21.3 % (ref 11.5–14.5)
GLUCOSE SERPL-MCNC: 87 MG/DL (ref 74–99)
HCT VFR BLD AUTO: 27.7 % (ref 41–52)
HGB BLD-MCNC: 9.7 G/DL (ref 13.5–17.5)
HGB RETIC QN: 31 PG (ref 28–38)
HOWELL-JOLLY BOD BLD QL SMEAR: PRESENT
HYPOCHROMIA BLD QL SMEAR: ABNORMAL
IMM GRANULOCYTES # BLD AUTO: 0.07 X10*3/UL (ref 0–0.7)
IMM GRANULOCYTES NFR BLD AUTO: 0.6 % (ref 0–0.9)
IMMATURE RETIC FRACTION: 15.4 %
LDH SERPL L TO P-CCNC: 438 U/L (ref 84–246)
LYMPHOCYTES # BLD MANUAL: 1.84 X10*3/UL (ref 1.2–4.8)
LYMPHOCYTES NFR BLD MANUAL: 16.4 %
MCH RBC QN AUTO: 28.9 PG (ref 26–34)
MCHC RBC AUTO-ENTMCNC: 35 G/DL (ref 32–36)
MCV RBC AUTO: 82 FL (ref 80–100)
MONOCYTES # BLD MANUAL: 1.15 X10*3/UL (ref 0.1–1)
MONOCYTES NFR BLD MANUAL: 10.3 %
NEUTS SEG # BLD MANUAL: 8.02 X10*3/UL (ref 1.2–7)
NEUTS SEG NFR BLD MANUAL: 71.6 %
NRBC BLD-RTO: 1.7 /100 WBCS (ref 0–0)
OVALOCYTES BLD QL SMEAR: ABNORMAL
PAPPENHEIMER BOD BLD QL SMEAR: PRESENT
PLATELET # BLD AUTO: 264 X10*3/UL (ref 150–450)
POLYCHROMASIA BLD QL SMEAR: ABNORMAL
POTASSIUM SERPL-SCNC: 3.9 MMOL/L (ref 3.5–5.3)
PROT SERPL-MCNC: 7.3 G/DL (ref 6.4–8.2)
RBC # BLD AUTO: 3.36 X10*6/UL (ref 4.5–5.9)
RBC MORPH BLD: ABNORMAL
RETICS #: 0.57 X10*6/UL (ref 0.02–0.12)
RETICS/RBC NFR AUTO: 16.9 % (ref 0.5–2)
SICKLE CELLS BLD QL SMEAR: ABNORMAL
SODIUM SERPL-SCNC: 135 MMOL/L (ref 136–145)
TOTAL CELLS COUNTED BLD: 116
WBC # BLD AUTO: 11.2 X10*3/UL (ref 4.4–11.3)

## 2025-01-23 PROCEDURE — 73218 MRI UPPER EXTREMITY W/O DYE: CPT | Mod: LEFT SIDE | Performed by: STUDENT IN AN ORGANIZED HEALTH CARE EDUCATION/TRAINING PROGRAM

## 2025-01-23 PROCEDURE — 85045 AUTOMATED RETICULOCYTE COUNT: CPT | Performed by: NURSE PRACTITIONER

## 2025-01-23 PROCEDURE — 73218 MRI UPPER EXTREMITY W/O DYE: CPT | Mod: 52,LT

## 2025-01-23 PROCEDURE — 83615 LACTATE (LD) (LDH) ENZYME: CPT | Performed by: NURSE PRACTITIONER

## 2025-01-23 PROCEDURE — 82550 ASSAY OF CK (CPK): CPT

## 2025-01-23 PROCEDURE — 99233 SBSQ HOSP IP/OBS HIGH 50: CPT

## 2025-01-23 PROCEDURE — 80053 COMPREHEN METABOLIC PANEL: CPT | Performed by: NURSE PRACTITIONER

## 2025-01-23 PROCEDURE — 2500000001 HC RX 250 WO HCPCS SELF ADMINISTERED DRUGS (ALT 637 FOR MEDICARE OP): Performed by: NURSE PRACTITIONER

## 2025-01-23 PROCEDURE — 2500000005 HC RX 250 GENERAL PHARMACY W/O HCPCS: Performed by: PHYSICIAN ASSISTANT

## 2025-01-23 PROCEDURE — 1170000001 HC PRIVATE ONCOLOGY ROOM DAILY

## 2025-01-23 PROCEDURE — 99223 1ST HOSP IP/OBS HIGH 75: CPT | Performed by: STUDENT IN AN ORGANIZED HEALTH CARE EDUCATION/TRAINING PROGRAM

## 2025-01-23 PROCEDURE — 2500000001 HC RX 250 WO HCPCS SELF ADMINISTERED DRUGS (ALT 637 FOR MEDICARE OP): Performed by: PHYSICIAN ASSISTANT

## 2025-01-23 PROCEDURE — 85027 COMPLETE CBC AUTOMATED: CPT | Performed by: NURSE PRACTITIONER

## 2025-01-23 PROCEDURE — 85007 BL SMEAR W/DIFF WBC COUNT: CPT | Performed by: NURSE PRACTITIONER

## 2025-01-23 PROCEDURE — 36415 COLL VENOUS BLD VENIPUNCTURE: CPT | Performed by: NURSE PRACTITIONER

## 2025-01-23 PROCEDURE — 2500000004 HC RX 250 GENERAL PHARMACY W/ HCPCS (ALT 636 FOR OP/ED): Performed by: NURSE PRACTITIONER

## 2025-01-23 PROCEDURE — 2500000004 HC RX 250 GENERAL PHARMACY W/ HCPCS (ALT 636 FOR OP/ED)

## 2025-01-23 PROCEDURE — 86140 C-REACTIVE PROTEIN: CPT

## 2025-01-23 RX ORDER — HYDROMORPHONE HYDROCHLORIDE 2 MG/ML
2 INJECTION, SOLUTION INTRAMUSCULAR; INTRAVENOUS; SUBCUTANEOUS ONCE
Status: COMPLETED | OUTPATIENT
Start: 2025-01-23 | End: 2025-01-23

## 2025-01-23 RX ORDER — HYDROMORPHONE HYDROCHLORIDE 2 MG/ML
4 INJECTION, SOLUTION INTRAMUSCULAR; INTRAVENOUS; SUBCUTANEOUS EVERY 2 HOUR PRN
Status: DISCONTINUED | OUTPATIENT
Start: 2025-01-23 | End: 2025-01-23

## 2025-01-23 RX ADMIN — HYDROMORPHONE HYDROCHLORIDE 4 MG: 2 INJECTION, SOLUTION INTRAMUSCULAR; INTRAVENOUS; SUBCUTANEOUS at 19:10

## 2025-01-23 RX ADMIN — HYDROMORPHONE HYDROCHLORIDE 4 MG: 2 INJECTION, SOLUTION INTRAMUSCULAR; INTRAVENOUS; SUBCUTANEOUS at 14:48

## 2025-01-23 RX ADMIN — HYDROMORPHONE HYDROCHLORIDE 2 MG: 2 INJECTION INTRAMUSCULAR; INTRAVENOUS; SUBCUTANEOUS at 22:08

## 2025-01-23 RX ADMIN — FOLIC ACID 1 MG: 1 TABLET ORAL at 08:45

## 2025-01-23 RX ADMIN — LIDOCAINE 4% 1 PATCH: 40 PATCH TOPICAL at 08:46

## 2025-01-23 RX ADMIN — Medication: at 02:48

## 2025-01-23 RX ADMIN — HYDROMORPHONE HYDROCHLORIDE 4 MG: 2 INJECTION, SOLUTION INTRAMUSCULAR; INTRAVENOUS; SUBCUTANEOUS at 12:47

## 2025-01-23 RX ADMIN — LIDOCAINE 4% 1 PATCH: 40 PATCH TOPICAL at 08:44

## 2025-01-23 RX ADMIN — PANTOPRAZOLE SODIUM 40 MG: 40 TABLET, DELAYED RELEASE ORAL at 08:45

## 2025-01-23 RX ADMIN — POLYETHYLENE GLYCOL 3350 17 G: 17 POWDER, FOR SOLUTION ORAL at 08:45

## 2025-01-23 RX ADMIN — DEXTROSE AND SODIUM CHLORIDE 75 ML/HR: 5; 450 INJECTION, SOLUTION INTRAVENOUS at 05:22

## 2025-01-23 RX ADMIN — SENNOSIDES AND DOCUSATE SODIUM 2 TABLET: 50; 8.6 TABLET ORAL at 08:45

## 2025-01-23 RX ADMIN — HYDROMORPHONE HYDROCHLORIDE 4 MG: 2 INJECTION, SOLUTION INTRAMUSCULAR; INTRAVENOUS; SUBCUTANEOUS at 17:04

## 2025-01-23 RX ADMIN — HYDROMORPHONE HYDROCHLORIDE 4 MG: 2 INJECTION, SOLUTION INTRAMUSCULAR; INTRAVENOUS; SUBCUTANEOUS at 08:42

## 2025-01-23 RX ADMIN — HYDROMORPHONE HYDROCHLORIDE 4 MG: 2 INJECTION, SOLUTION INTRAMUSCULAR; INTRAVENOUS; SUBCUTANEOUS at 10:41

## 2025-01-23 RX ADMIN — HYDROCORTISONE: 1 CREAM TOPICAL at 08:47

## 2025-01-23 RX ADMIN — HYDROMORPHONE HYDROCHLORIDE 4 MG: 2 INJECTION, SOLUTION INTRAMUSCULAR; INTRAVENOUS; SUBCUTANEOUS at 21:11

## 2025-01-23 ASSESSMENT — COGNITIVE AND FUNCTIONAL STATUS - GENERAL
DAILY ACTIVITIY SCORE: 24
MOBILITY SCORE: 24

## 2025-01-23 ASSESSMENT — PAIN DESCRIPTION - LOCATION
LOCATION: GENERALIZED
LOCATION: ARM
LOCATION: LEG
LOCATION: GENERALIZED

## 2025-01-23 ASSESSMENT — PAIN SCALES - GENERAL
PAINLEVEL_OUTOF10: 8
PAINLEVEL_OUTOF10: 7
PAINLEVEL_OUTOF10: 10 - WORST POSSIBLE PAIN
PAINLEVEL_OUTOF10: 7
PAINLEVEL_OUTOF10: 7
PAINLEVEL_OUTOF10: 8
PAINLEVEL_OUTOF10: 6
PAINLEVEL_OUTOF10: 10 - WORST POSSIBLE PAIN
PAINLEVEL_OUTOF10: 8
PAINLEVEL_OUTOF10: 6
PAINLEVEL_OUTOF10: 8
PAINLEVEL_OUTOF10: 7
PAINLEVEL_OUTOF10: 8
PAINLEVEL_OUTOF10: 7
PAINLEVEL_OUTOF10: 8
PAINLEVEL_OUTOF10: 8
PAINLEVEL_OUTOF10: 6
PAINLEVEL_OUTOF10: 6

## 2025-01-23 ASSESSMENT — PAIN DESCRIPTION - ORIENTATION
ORIENTATION: LEFT
ORIENTATION: RIGHT

## 2025-01-23 NOTE — CONSULTS
ORTHOPAEDIC CONSULTATION     Subjective   History Of Present Illness  Joellen Narayan is a 24 y.o. male (SS w repeated pain crises, Hodgkins lymphoma s/p chemo) admitted for R shin, L arm pain for 4d with concern for repeat crisis. Reports extremity pain due to crises previously. Takes oxy for pain, no biologics. Single fever on 1/20.    Orthopaedic Problems/Injuries:  R tibia + L humerus lesion c/f osteomyelitis vs sickle cell osteonecrosis    Location: Painful at R shin and L arm  Duration: Pain has been persistent for 4d  Severity: 5 /10    Other Injuries: none  NPO: no    Past medical history: per HPI/EMR  Past surgical history: per HPI/EMR  Allergies: NKDA  Medications: per EMR  Social History: cigar smoking, no drinking, denies IVDU  Family History:  Non-contributory to this patient's acute surgical issue.    Review of Systems: 12 point ROS negative unless stated in HPI    Past Medical History  He has a past medical history of Acute chest syndrome (Multi), Corrosion of unspecified body region, unspecified degree (12/31/2014), Impetigo (01/04/2024), Nicotine use disorder, Nodular lymphocyte predominant Hodgkin lymphoma, Personal history of diseases of the blood and blood-forming organs and certain disorders involving the immune mechanism, Personal history of other (healed) physical injury and trauma (01/03/2015), Personal history of other diseases of the circulatory system, Personal history of other diseases of the circulatory system, Priapism, Rash and other nonspecific skin eruption (09/09/2014), and Sickle cell disease (Multi).    Surgical History  He has a past surgical history that includes IR CVC tunneled (6/21/2022); CT guided percutaneous biopsy LYMPH node superficial (11/18/2022); and CT guided percutaneous abdominal retroperitoneum biopsy (11/30/2023).     Social History  He reports that he has been smoking cigars. He has been exposed to tobacco smoke. He has never used smokeless tobacco. He reports  "that he does not currently use alcohol. He reports current drug use. Drug: Marijuana.    Family History  Family History   Problem Relation Name Age of Onset    Sickle cell trait Mother      Diabetes Mother      Sickle cell trait Father      Lung cancer Brother          Allergies  Cefepime, Amoxicillin, and Ceftriaxone    Review of Systems  12 point ROS negative unless stated in HPI          Objective   Physical Exam  General: Lying comfortably in bed, no acute distress  HEENT: EOMI, NC/AT  CV: RRR by peripheral pulses  Resp: Normal WOB  MSK: See below. Gross motor in tact.  Neurologic: AOx3  Skin: No rash  Psych: Mood appropriate  RLE  -Skin in tact, no open wounds  -TTP over anterior shin  -Full, but painful ROM at knee and ankle  -Fires DF/PF/EHL/FHL, SILT in saph/sural/SPN/DPN/tibial distributions  -Foot wwp, brisk cap refill, 2+ DP pulse  -Compartments soft and compressible    LUE  -Skin in tact, no open wounds  -Mild TTP over mid-humerus  -No pain w ROM of shoulder or elbow  -Fires AIN/PIN/u  -SILT m/r/u  -hand wwp, brisk cap refill, 2+ radial pulse  -Compartments soft and compressible     Last Recorded Vitals  Blood pressure 111/66, pulse 83, temperature 36.4 °C (97.5 °F), temperature source Temporal, resp. rate 16, height 1.88 m (6' 2\"), weight 72.6 kg (160 lb), SpO2 94%.    Relevant Results  Results for orders placed or performed during the hospital encounter of 01/19/25 (from the past 24 hours)   CBC and Auto Differential   Result Value Ref Range    WBC 11.2 4.4 - 11.3 x10*3/uL    nRBC 1.7 (H) 0.0 - 0.0 /100 WBCs    RBC 3.36 (L) 4.50 - 5.90 x10*6/uL    Hemoglobin 9.7 (L) 13.5 - 17.5 g/dL    Hematocrit 27.7 (L) 41.0 - 52.0 %    MCV 82 80 - 100 fL    MCH 28.9 26.0 - 34.0 pg    MCHC 35.0 32.0 - 36.0 g/dL    RDW 21.3 (H) 11.5 - 14.5 %    Platelets 264 150 - 450 x10*3/uL    Immature Granulocytes %, Automated 0.6 0.0 - 0.9 %    Immature Granulocytes Absolute, Automated 0.07 0.00 - 0.70 x10*3/uL   Comprehensive " Metabolic Panel   Result Value Ref Range    Glucose 87 74 - 99 mg/dL    Sodium 135 (L) 136 - 145 mmol/L    Potassium 3.9 3.5 - 5.3 mmol/L    Chloride 100 98 - 107 mmol/L    Bicarbonate 26 21 - 32 mmol/L    Anion Gap 13 10 - 20 mmol/L    Urea Nitrogen 5 (L) 6 - 23 mg/dL    Creatinine 0.56 0.50 - 1.30 mg/dL    eGFR >90 >60 mL/min/1.73m*2    Calcium 9.9 8.6 - 10.6 mg/dL    Albumin 4.7 3.4 - 5.0 g/dL    Alkaline Phosphatase 183 (H) 33 - 120 U/L    Total Protein 7.3 6.4 - 8.2 g/dL    AST 47 (H) 9 - 39 U/L    Bilirubin, Total 12.7 (H) 0.0 - 1.2 mg/dL    ALT 27 10 - 52 U/L   Lactate Dehydrogenase   Result Value Ref Range     (H) 84 - 246 U/L   Reticulocytes   Result Value Ref Range    Retic % 16.9 (H) 0.5 - 2.0 %    Retic Absolute 0.569 (H) 0.022 - 0.118 x10*6/uL    Reticulocyte Hemoglobin 31 28 - 38 pg    Immature Retic fraction 15.4 <=16.0 %   C-reactive protein   Result Value Ref Range    C-Reactive Protein 12.62 (H) <1.00 mg/dL   Manual Differential   Result Value Ref Range    Neutrophils %, Manual 71.6 40.0 - 80.0 %    Lymphocytes %, Manual 16.4 13.0 - 44.0 %    Monocytes %, Manual 10.3 2.0 - 10.0 %    Eosinophils %, Manual 1.7 0.0 - 6.0 %    Basophils %, Manual 0.0 0.0 - 2.0 %    Seg Neutrophils Absolute, Manual 8.02 (H) 1.20 - 7.00 x10*3/uL    Lymphocytes Absolute, Manual 1.84 1.20 - 4.80 x10*3/uL    Monocytes Absolute, Manual 1.15 (H) 0.10 - 1.00 x10*3/uL    Eosinophils Absolute, Manual 0.19 0.00 - 0.70 x10*3/uL    Basophils Absolute, Manual 0.00 0.00 - 0.10 x10*3/uL    Total Cells Counted 116     RBC Morphology See Below     Polychromasia Mild     Hypochromia Mild     Sickle Cells Many     Ovalocytes Few     Porter-Jolly Bodies Present     Pappenheimer Bodies Present    Creatine Kinase   Result Value Ref Range    Creatine Kinase 36 0 - 325 U/L     *Note: Due to a large number of results and/or encounters for the requested time period, some results have not been displayed. A complete set of results can be  found in Results Review.       Images:  MRI R tibia and L shoulder show signal change within the tibial + humeral canal with concern for osteomyelitis vs necrosis.           Assessment:  24M (SS w repeated pain crises, Hodgkins lymphoma s/p chemo) adm for R tibia, L arm pain for 4d c/f repeat crisis. Reports extremity pain d/t crises previously. Takes oxy for pain, no biologics. Fever on 1/20 ow VSS. NVI. TTP over R mid shin, L midshaft humerus. MRI w signal change w/in tibial + humeral canal c/f osteo vs osteonecrosis.    Plan:  - No acute orthopaedic surgical intervention  - Recommend ID consult for recommendations  - If c/f osteo per ID, recommend IV abx  - Ortho on standby for bony debridement  - WBAT RLE and LUE  - Okay for diet from orthopedic perspective  - okay for DVT ppx from orthopedic perspective    Patient seen within 30 minutes of page.    Consult to be discussed with attending, Dr. Lia Mercado MD, PGY-2  Orthopaedic Surgery  On-call: [phone]  Epic Chat Preferred     This patient will be followed peripherally by the Orthopaedic Trauma service. Please page or Epic Chat the corresponding residents below with questions or concerns.     Ortho Trauma Service (Epic Chat Preferred)  First Call: Favian Medellin, PGY-1; Yunior Thompson, PGY-1  Second Call: Kathy Mercado, PGY-2  Third Call: Librado Marroquin, PGY-3    6pm-6am M-F, Holidays, and weekends page Ortho on-call @[phone] with urgent questions/concerns.

## 2025-01-23 NOTE — PROGRESS NOTES
was walking past patient Joellen Narayan's room when facility dog, Oliva, turned toward his room. Patient known to this  and facility dog from previous admissions. Patient expressed he was in pain but welcomed brief visit from this  and facility dog.  agreed to follow up later in the week. Patient was appreciative and did not have any needs at this time. Spiritual Care remains available as needed/requested.    Rev. Diana Álvarez MDiv, BCC

## 2025-01-23 NOTE — CARE PLAN
The patient's goals for the shift include Rest and Comfort    The clinical goals for the shift include pt will remain HDS and VSS throughout shift 1/23/25 by 0700.      Problem: Pain - Adult  Goal: Verbalizes/displays adequate comfort level or baseline comfort level  Outcome: Progressing     Problem: Safety - Adult  Goal: Free from fall injury  Outcome: Progressing     Problem: Discharge Planning  Goal: Discharge to home or other facility with appropriate resources  Outcome: Progressing     Problem: Chronic Conditions and Co-morbidities  Goal: Patient's chronic conditions and co-morbidity symptoms are monitored and maintained or improved  Outcome: Progressing     Problem: Pain  Goal: Takes deep breaths with improved pain control throughout the shift  Outcome: Progressing  Goal: Turns in bed with improved pain control throughout the shift  Outcome: Progressing  Goal: Walks with improved pain control throughout the shift  Outcome: Progressing  Goal: Performs ADL's with improved pain control throughout shift  Outcome: Progressing  Goal: Participates in PT with improved pain control throughout the shift  Outcome: Progressing  Goal: Free from opioid side effects throughout the shift  Outcome: Progressing  Goal: Free from acute confusion related to pain meds throughout the shift  Outcome: Progressing

## 2025-01-23 NOTE — PROGRESS NOTES
Joellen Narayan is a 24 y.o. male on day 4 of admission presenting with Sickle cell pain crisis (Multi).    Subjective   Seen this morning at bedside. Last night coming back from MRI pain was heightened and he had a restless night. This morning his pain in his left shoulder and right shin are throbbing and 7/10. Patient requesting coming off PCA today and transitioning back to Q2hr pain medications. He is still denying abdominal pain. Had one episode of emesis overnight but he thinks that was related to his increased pain. Discussed MRI reads are pending. He does have an area on his right ankle that is a healed wound, last night he was scratching the site and he was ordered hydrocortisone cream. This AM, the wound is mildly open, no drianage, bleeding, swelling or signs of infection. Educated to keep using cream and leave area CRISTIN. Patient agreeable to plan and had no further questions. LBM 1/21. Urinating and drinking okay. Denies chest pain, SOB, N/V, abd pain.     Objective     Physical Exam  Constitutional:       Appearance: Normal appearance.   HENT:      Head: Normocephalic and atraumatic.   Eyes:      Extraocular Movements: Extraocular movements intact.   Cardiovascular:      Rate and Rhythm: Normal rate and regular rhythm.   Pulmonary:      Effort: Pulmonary effort is normal.      Breath sounds: Normal breath sounds.   Abdominal:      General: Abdomen is flat.      Palpations: Abdomen is soft.      Tenderness: There is abdominal tenderness (RLQ).   Musculoskeletal:         General: Normal range of motion.      Comments: Tenderness to right shin   Skin:     General: Skin is warm and dry.   Neurological:      General: No focal deficit present.      Mental Status: He is alert and oriented to person, place, and time.   Psychiatric:         Mood and Affect: Mood normal.         Behavior: Behavior normal.         Last Recorded Vitals  Blood pressure 129/56, pulse 87, temperature 36.8 °C (98.2 °F), temperature  "source Temporal, resp. rate 18, height 1.88 m (6' 2\"), weight 72.6 kg (160 lb), SpO2 94%.  Intake/Output last 3 Shifts:  I/O last 3 completed shifts:  In: 1256.3 (17.3 mL/kg) [P.O.:1000; I.V.:256.3 (3.5 mL/kg)]  Out: 225 (3.1 mL/kg) [Urine:225 (0.1 mL/kg/hr)]  Weight: 72.6 kg     Relevant Results  XR chest 2 views  Result Date: 1/19/2025  Cardiomegaly but no evidence of other acute intrathoracic abnormality.   Signed by: Wyatt Hicks 1/19/2025 8:57 AM Dictation workstation:   [id number]     right upper quadrant  Result Date: 12/28/2024  Cholelithiasis without evidence of acute cholecystitis.   Hepatomegaly with hepatic steatosis.   I personally reviewed the images/study and I agree with the findings as stated by resident physician Dr. José Miguel Flowers . This study was interpreted at [place], [place]..   MACRO: None   Signed by: Jamshid Enrique 12/28/2024 7:43 AM Dictation workstation:   [id number]    Scheduled medications  enoxaparin, 40 mg, subcutaneous, q24h  folic acid, 1 mg, oral, Daily  hydrocortisone, , Topical, BID  lidocaine, 1 patch, transdermal, Daily  lidocaine, 1 patch, transdermal, Daily  oxygen, , inhalation, Continuous - Inhalation  pantoprazole, 40 mg, oral, Daily before breakfast  polyethylene glycol, 17 g, oral, Daily  sennosides-docusate sodium, 2 tablet, oral, BID      Continuous medications  dextrose 5%-0.45 % sodium chloride, 75 mL/hr, Last Rate: 75 mL/hr (01/23/25 0522)      PRN medications  PRN medications: diphenhydrAMINE, HYDROmorphone, naloxone, ondansetron, pseudoephedrine     Assessment/Plan   Assessment & Plan  Hodgkin's paragranuloma (Multi)    Sickle cell anemia with pain (Multi)    Joellen Narayan is a 24 y.o. 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 Hgb and lysis labs at baseline. CXR (1/19) without acute process. Admitted for further management on pain. Started on IV dilaudid per care plan and transitioned to dPCA pump on 1/21 for further pain control. Xray right leg and left shoulder unremarkable, no AVN (1/21). Bilirubin and lysis labs worsening (1/21), CRP elevated. MRI left shoulder and right tib/fib pending to r/o osteomyelitis pending read. On 1/23 patient transitioned off dPCA and restarted on Q2hr pain medication. DC pending improvement in pain and MRI.      # Update 1/23:   - Transitioned off PCA and restarted 4mg IVP dilaudid Q2hrs   - T-bili elevated, slowly downtrending; CTM, denies abdominal pain, N/V   - s/p D5 1/2 NS x24hrs 1/22   - MRI left shoulder and MRI right tib/fib to rule out osteonecrosis vs osteomyelitis given worsening lysis labs and elevated CRP pending read   - Right ankle chronic wound, small tear from scratching; no signs of infection, swelling or drainage; CTM   - trend CRP per Dr. Cruz      # Hgb SS with Pain  - OARRS reviewed, no aberrancies  - No current care path  - Hgb baseline ~8.5; Hgb 10.7 (1/19); no indication for transfusion  - Tbili baseline fluctuates ~5 (planning for cholecystectomy 1/30); Tbili 7.2 (1/19), T.bili elevated to 12.8, direct 1.9   - LDH baseline ~500;  (1/19) --> 450 (1/22)   - s/p 1L IV LR in ED 1/19, plan IV hydration with D51/2 X 12 hrs   - s/p IV dilaudid 3mg q2hrs PRN severe pain, now increased to IV Dilaudid 4mg q2 hrs PRN (1/20)  - s/p dPCA for uncontrolled pain (1/21-1/22)  - 1/23 Transitioned off PCA and restarted 4mg IVP dilaudid Q2hrs   - Started IV Toradol 15 mg q6 X 3 days with PPI (1/19)   - Started Lidocaine patch   - Continue home folic acid 1mg daily  -  Pre exchange labs sent 1/19 d/t reported severity of pain  - Hgb S (1/19): 58.6%   - Utox (1/19): positive for cannabinoid   - PO Zofran PRN for opioid-induced nausea and PO Benadryl PRN for opioid-induced pruritus  - Bowel regimen for opioid-induced constipation with DocuSenna 2tabs BID and Miralax daily; LBM 1/21   - Monitor need for exchange if labs and pain worsens, exchange labs sent 1/21   - 1/21 Xray right leg and left shoulder unremarkable, no AVN, may need MRI of leg pain persists    - sed rate negative (1/21)  - CRP 13.31 (1/21) --> 12.66 (1/22) --> 12.61 (1/223); trend daily per Dr. Cruz   - 1/22 reporting improvement in pain but will order MRI left shoulder and MRI right tib/fib to rule out osteonecrosis vs osteomyelitis given worsening lysis labs and elevated CRP; pending read   - Right ankle chronic wound, small tear from scratching; no signs of infection, swelling or drainage; CTM    # Fever  - No signs of infection, last low grade fever (38.1) 1/20   - Blood cultures (1/21) NGTD   - UA/urine cult negative 1/21  - 1/21 Xray right leg and left shoulder unremarkable, no AVN, may need MRI of leg pain persists  - No indication for abx at this time      # Hx of Priapism  - Recent admission c/b worsening priapism needing urgent [facility] (9/19)  - Denies recurrent issues with priapism outpt recently  - Continue Sudafed 60mg q8h PRN for priapism      # Recent Choledocholithiasis  # Plan for Cholecystectomy 1/31/25  - s/p ERCP 7/18/24 with a biliary sphincterotomy where sludge and stones were removed, achieving complete clearance  - Seen by gen surg 8/8/24 Dr. Dove who rec lap cholecystectomy d/t the chance of recurrence of choledocholithiasis  - Tbili baseline fluctuates ~5; Tbili 7.2 (1/19) --> 13.8 (1/22)   - direct bili 1.9 (1/21)   - No active abd pain on admit; low threshold for CT a/p or RUQ US if abdominal pain occurs  - Planning for cholecystectomy outpt 1/31/25     # Nodular lymphocyte predominant  Hodgkins lymphoma (NLPHL)   - Primary Oncologist: Dr. Stoll  - Enlarged lymph nodes noted 4/1/22  - RUQ US (11/14/22) with mildly enlarged LNs in the region of the kavin hepatis  - MRI liver (11/16/22) showed re-demonstration of bulky retroperitoneal lymphadenopathy and kavin hepatic lymphadenopathy    - (11/18/22) lymph node biopsy showed atypical lymphoid infiltrate. Reviewed by Hemepath board, insufficient for lymphoma diagnosis  - PET/CT (12/6/22) showing retroperitoneal lymphadenopathy  - Followed up with Dr. Stoll (12/16/22) with plan for surg/onc consult for large tissue bx but patient missed apt and was lost to follow up  - Requested new apt with Dr. Ronnie Marte 6/19/23, patient was not seen and lost to follow up  - CT a/p (11/28/23) increased size of retroperitoneal lymph nodes reflecting extramedullary hematopoiesis I/s/o sickle cell vs lymphoma  - Paraaortic LN bx via IR (11/30/23) consistent with NLPHL. Flow: no clonal B cell or T cell population identified, lymphocyte 95%, CD3+CD4+ 68%, CD3+CD8+ 7%, CD19+ 24%  - Elevated LDH/bili partially from sickle cell disease   - Chemotherapy (R-CHOP) was discussed with primary oncologist Dr. Stoll, and decision was made to simplify his chemotherapy to Rituxan and prednisone q3 weeks mainly due to frequent sickle cell crisis  - Current chemo regimen: Rituxan and prednisone q3 weeks (C1 1/18/24, C2 2/8/24, Missed C3 d/t sickle cell pain crisis, C4 4/4/24, C5 5/16/24, C6 6/7/24)  - Per pt no longer taking Acyclovir 400mg BID prophy   - PET CT (7/25) overall showing great response to treatment with persistent residual viable disease involving a left common iliac node  - PET/CT (11/18) showing Interval increased metabolic activity within upper abdominal and  bilateral inguinal lymph nodes compatible with interval worsening of lymphomatous disease  - Last FUV with Dr. Stoll 11/21, d/w pt and mother regarding tx options vs observation. No current treatment at this  "time and will continue with observation and plan for repeat CT/PET in 3 months and follow up after that  - Next PET/CT 3/4, Dr. Stoll FU 3/13     # Hx of nocturnal oxygen dependence and hypoxia on room air  - Pt currently has home 2L supp O2   - Wean as able, encourage incentive spirometry  - Pulm FUV 8/8- \"Given his history of sickle cell disease, it is important to ensure he has formal sleep testing to look at desaturations and presence of sleep apnea despite not having a convincing history/body habitus typical for suspecting sleep apnea- patients with sickle cell have a higher prevalence of sleep disorders including nocturnal hypoxemia\"--> rec sleep referral for formal testing if deemed appropriate, ABG and O2 destat testing, and TTE with bubble study  - TTE 8/23 showing EF 60-65%, severely dilated LV and LA, + intrapulmonary shunting  - Has Pulm FUV 2/13  - Pt currently on 2L supp O2  - Encourage IS     # DVT prophy:   - Lovenox subcutaneous, SCDs, encourage ambulation     # DISPO:  - Full code, confirmed on admit  - DC pending improvement in pain and MRI   - SUSIE Narayan (Parent) [phone]  - Cardiology FUV 1/28, Cholecystectomy 1/31, Pulm EPV 2/13, PET/CT 3/4, Dr. Stoll FU 3/13, Sickle Cell FUV 4/9    I spent 60 minutes in the professional and overall care of this patient.    Assessment and plan as above discussed with attending physician Dr. Bren Arana, APRN-CNP  "

## 2025-01-23 NOTE — CONSULTS
Inpatient consult to Infectious Diseases  Consult performed by: Cece Josue DO  Consult ordered by: RAVI Macias        Referred by RAVI Macias    Primary MD: Ian Cruz MD    Reason For Consult: possible OM     History Of Present Illness  Joellen Narayan is a 24 y.o. man pmhx sig for nodular lymphocyte predominant Hodgkins lymphoma (NLPHL) (s/p rituximab/prednisone), HbSS sickle cell disease, choledocholithiasis, admitted 1/19 with pain in RLE. On admission he was normotensive, not tachycardic, intermittently on home O2 at night. WBC 14k, Cr 0.67. WBC continued to improve during his hospitalization w/ conservative measures and remaining off abx. He had blood cx 1/21 NG x 2 days. 1/20 was febrile to 100.6F. CRP notably elev to 12-13. Due to RLE pain and L shoulder pain he had MRI imaging of both, with signal changes suggestive of either SSD or OM.    Speaking with the pt today he reports having sudden onset RLE pain not responsive to oxycodone which prompted his ED visit. Still feeling very fatigued, sig amount of leg pain when ambulating. Prior to admission reports he was feeling ok, able to ambulate, appetite was down but he attributes this to the gallbladder issues. No fever, chills, sweats. No GI symptoms, no resp symptoms, no dysuria. Mild HA, no vision changes. No trauma to his leg or shoulder, no bites or scratches, no other joint swelling or pain. No recent procedures.      Past Medical History  He has a past medical history of Acute chest syndrome (Multi), Corrosion of unspecified body region, unspecified degree (12/31/2014), Impetigo (01/04/2024), Nicotine use disorder, Nodular lymphocyte predominant Hodgkin lymphoma, Personal history of diseases of the blood and blood-forming organs and certain disorders involving the immune mechanism, Personal history of other (healed) physical injury and trauma (01/03/2015), Personal history of other diseases of the circulatory  system, Personal history of other diseases of the circulatory system, Priapism, Rash and other nonspecific skin eruption (2014), and Sickle cell disease (Multi).    Surgical History  He has a past surgical history that includes IR CVC tunneled (2022); CT guided percutaneous biopsy LYMPH node superficial (2022); and CT guided percutaneous abdominal retroperitoneum biopsy (2023).     Social History     Occupational History    Not on file   Tobacco Use    Smoking status: Every Day     Types: Cigars     Last attempt to quit:      Years since quittin.0     Passive exposure: Past    Smokeless tobacco: Never    Tobacco comments:      1 Black and mild daily for 3 years   Substance and Sexual Activity    Alcohol use: Not Currently     Comment: rarely    Drug use: Yes     Types: Marijuana    Sexual activity: Not Currently     Travel History   Travel since 24    No documented travel since 24        Lives w/ his mom in [place], no pets. 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[employer], off work x 1 mos     Pets: no  Hobbies: no    Family History  Family History   Problem Relation Name Age of Onset    Sickle cell trait Mother      Diabetes Mother      Sickle cell trait Father      Lung cancer Brother       Allergies  Cefepime, Amoxicillin, and Ceftriaxone - reports hives w/ all of these     Immunization History   Administered Date(s) Administered    Hep A / Hep B 2009    Hepatitis A vaccine, pediatric/adolescent (HAVRIX, VAQTA) 2009    Meningococcal ACWY-D (Menactra) 4-valent conjugate vaccine 2018    Meningococcal B vaccine (BEXSERO) 2018, 2019    Meningococcal MPSV4 2002    Meningococcal, Unknown Serogroups 2002    Pneumococcal conjugate vaccine, 13-valent (PREVNAR 13) 2013    Pneumococcal polysaccharide vaccine, 23-valent, age 2 years and older (PNEUMOVAX 23) 2002      Medications  Home medications:  Medications Prior to Admission   Medication Sig Dispense Refill Last Dose/Taking    baclofen (Lioresal) 5 mg tablet Take 1 tablet (5 mg) by mouth 3 times a day. (Patient not taking: Reported on 12/22/2024) 90 tablet 0     chlorhexidine (Hibiclens) 4 % external liquid Apply topically once daily as needed for wound care. Use for 5 days prior to surgery as body wash, do not use on face or genital region 473 mL 0     chlorhexidine (Peridex) 0.12 % solution Use 15 mL in the mouth or throat if needed for wound care for up to 2 days. Use as mouth wash for night before and morning of surgery 473 mL 0     folic acid (Folvite) 1 mg tablet Take 1 tablet (1 mg) by mouth once daily. 30 tablet 0     oxyCODONE (Roxicodone) 20 mg immediate release tablet Take 1 tablet (20 mg) by mouth every 8 hours if needed for severe pain (7 - 10).       pseudoephedrine (Sudogest) 60 mg tablet Take 1 tablet (60 mg) by mouth every 8 hours if needed for congestion for up to 10 days. (Patient not taking: Reported on 12/22/2024) 30 tablet 0      Current medications:  Scheduled medications  enoxaparin, 40 mg, subcutaneous, q24h  folic acid, 1 mg, oral, Daily  hydrocortisone, , Topical, BID  lidocaine, 1 patch, transdermal, Daily  lidocaine, 1 patch, transdermal, Daily  oxygen, , inhalation, Continuous - Inhalation  pantoprazole, 40 mg, oral, Daily before breakfast  polyethylene glycol, 17 g, oral, Daily  sennosides-docusate sodium, 2 tablet, oral, BID      Continuous medications     PRN medications  PRN medications: diphenhydrAMINE, HYDROmorphone, naloxone, ondansetron, pseudoephedrine    Review of Systems - per HPI      Objective  Range of Vitals (last 24 hours)  Heart Rate:  [78-87]   Temp:  [36.2 °C (97.2 °F)-36.8 °C (98.2 °F)]   Resp:  [18-19]   BP: (123-138)/(56-77)   SpO2:  [94 %-96 %]   Daily Weight  01/19/25 : 72.6 kg (160 lb)    Body mass index is 20.54 kg/m².     Physical Exam    Fatigued, nontoxic  appearing, falling asleep at times on exam, NAD  MMM, dentition intact  Lungs CTA anteriorly   RRR, S1/S2, no murmurs   Abd soft, nontender   L shoulder w/ mild effusion, tenderness to palpation, warm but had a heat pack on   RLE over tibia exquisitely tender; no overlying skin changes, no edema, no fluctuance        Relevant Results  Outside Hospital Results  No  Labs  Results from last 72 hours   Lab Units 01/23/25  0811 01/22/25  0838 01/21/25  0708   WBC AUTO x10*3/uL 11.2 10.7 14.2*   HEMOGLOBIN g/dL 9.7* 9.1* 9.9*   HEMATOCRIT % 27.7* 26.3* 28.5*   PLATELETS AUTO x10*3/uL 264 188 229   NEUTROS PCT AUTO %  --  62.5 67.9   LYMPHO PCT MAN % 16.4  --   --    LYMPHS PCT AUTO %  --  17.5 15.0   MONO PCT MAN % 10.3  --   --    MONOS PCT AUTO %  --  12.7 12.4   EOSINO PCT MAN % 1.7  --   --    EOS PCT AUTO %  --  5.6 3.4     Results from last 72 hours   Lab Units 01/23/25  0811 01/22/25  0838 01/21/25  0708   SODIUM mmol/L 135* 138 137   POTASSIUM mmol/L 3.9 3.5 3.4*   CHLORIDE mmol/L 100 102 100   CO2 mmol/L 26 29 26   BUN mg/dL 5* 6 6   CREATININE mg/dL 0.56 0.56 0.56   GLUCOSE mg/dL 87 106* 66*   CALCIUM mg/dL 9.9 9.5 9.5   ANION GAP mmol/L 13 11 14   EGFR mL/min/1.73m*2 >90 >90 >90     Results from last 72 hours   Lab Units 01/23/25  0811 01/22/25  0838 01/21/25  0708   ALK PHOS U/L 183* 154* 146*   BILIRUBIN TOTAL mg/dL 12.7* 13.8* 12.8*   BILIRUBIN DIRECT mg/dL  --   --  1.9*   PROTEIN TOTAL g/dL 7.3 6.7 6.8   ALT U/L 27 15 15   AST U/L 47* 40* 34   ALBUMIN g/dL 4.7 4.4 4.6     Estimated Creatinine Clearance: 125 mL/min (by SOPHIE-G formula based on SCr of 0.56 mg/dL).  C-Reactive Protein   Date Value Ref Range Status   01/23/2025 12.62 (H) <1.00 mg/dL Final   01/22/2025 12.66 (H) <1.00 mg/dL Final   01/21/2025 13.31 (H) <1.00 mg/dL Final     Sedimentation Rate   Date Value Ref Range Status   01/21/2025 <1 0 - 15 mm/h Final   03/17/2023 <1 0 - 15 mm/h Final   02/15/2023 2 0 - 15 mm/h Final     HIV 1 and 2 Screen    Date Value Ref Range Status   12/03/2022 NONREACTIVE NONREACTIVE Final     Comment:      HIV Ag/Ab screen is performed using the [employer]   HIV Ag/Ab Combo assay which detects the presence of HIV    p24 antigen as well as antibodies to HIV-1   (Group M and O) and HIV-2.  .  No laboratory evidence of HIV infection. If acute HIV infection is   suspected, consider testing for HIV RNA by PCR (viral load).       Hepatitis C AB   Date Value Ref Range Status   07/13/2024 Nonreactive Nonreactive Final     Comment:     Results from patients taking biotin supplements or receiving high-dose biotin therapy should be interpreted with caution due to possible interference with this test. Providers may contact their local laboratory for further information.     HCV PCR Quant   Date Value Ref Range Status   11/16/2022 NOT DETECTED IU/mL Final     Comment:     REF VALUE  NOT DETECTED       Microbiology    1/21 blood cx NGTD    Imaging    1/23 MR shoulder    IMPRESSION:  Partially visualized signal abnormality of the intramedullary cavity  of the humeral diaphysis with associated periosteal reaction. This  could be related to patient's known sickle cell disease although  osteomyelitis is another diagnostic consideration. Both could be  present. Laboratory correlation is recommended. MRI of the humerus is  recommended to assess the entirety of the process.    1/23 MR tib/fib    IMPRESSION:  1. Extensive abnormal signal intensity seen in the tibia and to a  lesser extent in the fibula. The limitations of this examination  including the extensive nature of the findings makes it difficult to  delineate whether or not the changes are related to sickle cell  disease and/or osteomyelitis and it is possible that both processes  are present. It is possible that the signal changes in the proximal  metadiaphysis of the tibia are most suspicious for osteomyelitis. A  dual isotope nuclear medicine study may be helpful for  further  assessment.  2. Edema in the pretibial soft tissues and surrounding muscles  favored to be reactive with infectious myositis not entirely excluded.      Assessment/Plan     Joellen Narayan is a 25 y/o man pmhx sig for Hodgkins lymphoma (NLPHL) s/p rituximab and prednisone, HbSS sickle cell disease, admitted 1/19 with acute onset RLE tibia pain and L shoulder pain, distinct from his usually sickle cell occlusive pain, and managed conservatively w/ pain control off abx. He had MR imaging of his shoulder and R leg with signal abnormality which could be from SSD or osteomyelitis. He has hx of choledocholithiasis and planned for cholecystectomy later in Jan; no recent positive blood cx here or Care Everywhere that I can see. No other obvious risk factors for OM including trauma, procedures, recent bacteremia, etc. besides relative immunosuppression and baseline risk w/ chronic SSD and risk of OM w/ salmonella, Staph aureus, etc. Will need to discuss w/ Radiology regarding ideal nuclear medicine study for differentiation of SSD/AVN vs osteomyelitis and if equivocal try to obtain biopsy for culture/pathology.      #HL, SSD, on rituximab/steroids     Recommendations:  -Continue to monitor off abx  -Follow up blood cx to finalization; if febrile please repeat 2 sets  -Follow up MR shoulder read   -Would pursue nuclear imaging of his RLE and shoulder as recommended by radiology for further differentiation, but would clarify which study would provide highest sensitivity/specificity for these 2 processes (tagged WBC scan, etc)    Following     Cece Josue DO

## 2025-01-24 ENCOUNTER — APPOINTMENT (OUTPATIENT)
Dept: VASCULAR MEDICINE | Facility: HOSPITAL | Age: 25
DRG: 812 | End: 2025-01-24
Payer: COMMERCIAL

## 2025-01-24 LAB
ALBUMIN SERPL BCP-MCNC: 5 G/DL (ref 3.4–5)
ALP SERPL-CCNC: 218 U/L (ref 33–120)
ALT SERPL W P-5'-P-CCNC: 37 U/L (ref 10–52)
ANION GAP SERPL CALC-SCNC: 14 MMOL/L (ref 10–20)
AST SERPL W P-5'-P-CCNC: 57 U/L (ref 9–39)
BASOPHILS # BLD AUTO: 0.05 X10*3/UL (ref 0–0.1)
BASOPHILS NFR BLD AUTO: 0.4 %
BILIRUB SERPL-MCNC: 11.4 MG/DL (ref 0–1.2)
BUN SERPL-MCNC: 6 MG/DL (ref 6–23)
CALCIUM SERPL-MCNC: 10.3 MG/DL (ref 8.6–10.6)
CHLORIDE SERPL-SCNC: 98 MMOL/L (ref 98–107)
CO2 SERPL-SCNC: 26 MMOL/L (ref 21–32)
CREAT SERPL-MCNC: 0.52 MG/DL (ref 0.5–1.3)
CRP SERPL-MCNC: 12.77 MG/DL
EGFRCR SERPLBLD CKD-EPI 2021: >90 ML/MIN/1.73M*2
EOSINOPHIL # BLD AUTO: 0.51 X10*3/UL (ref 0–0.7)
EOSINOPHIL NFR BLD AUTO: 4.2 %
ERYTHROCYTE [DISTWIDTH] IN BLOOD BY AUTOMATED COUNT: 21.4 % (ref 11.5–14.5)
GLUCOSE SERPL-MCNC: 84 MG/DL (ref 74–99)
HCT VFR BLD AUTO: 28.3 % (ref 41–52)
HGB BLD-MCNC: 9.9 G/DL (ref 13.5–17.5)
HGB RETIC QN: 29 PG (ref 28–38)
HOWELL-JOLLY BOD BLD QL SMEAR: PRESENT
HYPOCHROMIA BLD QL SMEAR: NORMAL
IMM GRANULOCYTES # BLD AUTO: 0.06 X10*3/UL (ref 0–0.7)
IMM GRANULOCYTES NFR BLD AUTO: 0.5 % (ref 0–0.9)
IMMATURE RETIC FRACTION: 16.5 %
LDH SERPL L TO P-CCNC: 474 U/L (ref 84–246)
LYMPHOCYTES # BLD AUTO: 2.04 X10*3/UL (ref 1.2–4.8)
LYMPHOCYTES NFR BLD AUTO: 17 %
MCH RBC QN AUTO: 28.4 PG (ref 26–34)
MCHC RBC AUTO-ENTMCNC: 35 G/DL (ref 32–36)
MCV RBC AUTO: 81 FL (ref 80–100)
MONOCYTES # BLD AUTO: 1.47 X10*3/UL (ref 0.1–1)
MONOCYTES NFR BLD AUTO: 12.2 %
NEUTROPHILS # BLD AUTO: 7.88 X10*3/UL (ref 1.2–7.7)
NEUTROPHILS NFR BLD AUTO: 65.7 %
NRBC BLD-RTO: 0.8 /100 WBCS (ref 0–0)
PLATELET # BLD AUTO: 228 X10*3/UL (ref 150–450)
POLYCHROMASIA BLD QL SMEAR: NORMAL
POTASSIUM SERPL-SCNC: 4.3 MMOL/L (ref 3.5–5.3)
PROT SERPL-MCNC: 8.1 G/DL (ref 6.4–8.2)
RBC # BLD AUTO: 3.48 X10*6/UL (ref 4.5–5.9)
RBC MORPH BLD: NORMAL
RETICS #: 0.5 X10*6/UL (ref 0.02–0.12)
RETICS/RBC NFR AUTO: 14.3 % (ref 0.5–2)
SICKLE CELLS BLD QL SMEAR: NORMAL
SODIUM SERPL-SCNC: 134 MMOL/L (ref 136–145)
TARGETS BLD QL SMEAR: NORMAL
WBC # BLD AUTO: 12 X10*3/UL (ref 4.4–11.3)

## 2025-01-24 PROCEDURE — 2500000004 HC RX 250 GENERAL PHARMACY W/ HCPCS (ALT 636 FOR OP/ED)

## 2025-01-24 PROCEDURE — 36415 COLL VENOUS BLD VENIPUNCTURE: CPT | Performed by: NURSE PRACTITIONER

## 2025-01-24 PROCEDURE — 84075 ASSAY ALKALINE PHOSPHATASE: CPT | Performed by: NURSE PRACTITIONER

## 2025-01-24 PROCEDURE — 1170000001 HC PRIVATE ONCOLOGY ROOM DAILY

## 2025-01-24 PROCEDURE — 93971 EXTREMITY STUDY: CPT | Performed by: INTERNAL MEDICINE

## 2025-01-24 PROCEDURE — 83615 LACTATE (LD) (LDH) ENZYME: CPT | Performed by: NURSE PRACTITIONER

## 2025-01-24 PROCEDURE — 85025 COMPLETE CBC W/AUTO DIFF WBC: CPT | Performed by: NURSE PRACTITIONER

## 2025-01-24 PROCEDURE — 2500000001 HC RX 250 WO HCPCS SELF ADMINISTERED DRUGS (ALT 637 FOR MEDICARE OP): Performed by: PHYSICIAN ASSISTANT

## 2025-01-24 PROCEDURE — 2500000005 HC RX 250 GENERAL PHARMACY W/O HCPCS: Performed by: PHYSICIAN ASSISTANT

## 2025-01-24 PROCEDURE — 99233 SBSQ HOSP IP/OBS HIGH 50: CPT

## 2025-01-24 PROCEDURE — 2500000005 HC RX 250 GENERAL PHARMACY W/O HCPCS

## 2025-01-24 PROCEDURE — 85045 AUTOMATED RETICULOCYTE COUNT: CPT | Performed by: NURSE PRACTITIONER

## 2025-01-24 PROCEDURE — 2500000001 HC RX 250 WO HCPCS SELF ADMINISTERED DRUGS (ALT 637 FOR MEDICARE OP): Performed by: NURSE PRACTITIONER

## 2025-01-24 PROCEDURE — 86140 C-REACTIVE PROTEIN: CPT

## 2025-01-24 PROCEDURE — 2500000005 HC RX 250 GENERAL PHARMACY W/O HCPCS: Performed by: NURSE PRACTITIONER

## 2025-01-24 PROCEDURE — 93971 EXTREMITY STUDY: CPT

## 2025-01-24 RX ORDER — LIDOCAINE 560 MG/1
2 PATCH PERCUTANEOUS; TOPICAL; TRANSDERMAL DAILY
Status: DISCONTINUED | OUTPATIENT
Start: 2025-01-24 | End: 2025-01-24

## 2025-01-24 RX ORDER — HYDROMORPHONE HYDROCHLORIDE 1 MG/ML
1 INJECTION, SOLUTION INTRAMUSCULAR; INTRAVENOUS; SUBCUTANEOUS ONCE
Status: COMPLETED | OUTPATIENT
Start: 2025-01-24 | End: 2025-01-24

## 2025-01-24 RX ORDER — OXYCODONE HYDROCHLORIDE 10 MG/1
20 TABLET ORAL EVERY 4 HOURS PRN
Status: DISCONTINUED | OUTPATIENT
Start: 2025-01-24 | End: 2025-01-24

## 2025-01-24 RX ORDER — LIDOCAINE 560 MG/1
1 PATCH PERCUTANEOUS; TOPICAL; TRANSDERMAL DAILY
Status: DISCONTINUED | OUTPATIENT
Start: 2025-01-25 | End: 2025-01-28 | Stop reason: HOSPADM

## 2025-01-24 RX ORDER — LIDOCAINE 560 MG/1
1 PATCH PERCUTANEOUS; TOPICAL; TRANSDERMAL ONCE
Status: COMPLETED | OUTPATIENT
Start: 2025-01-24 | End: 2025-01-24

## 2025-01-24 RX ORDER — LIDOCAINE 560 MG/1
2 PATCH PERCUTANEOUS; TOPICAL; TRANSDERMAL DAILY
Status: DISCONTINUED | OUTPATIENT
Start: 2025-01-24 | End: 2025-01-28 | Stop reason: HOSPADM

## 2025-01-24 RX ADMIN — LIDOCAINE 4% 1 PATCH: 40 PATCH TOPICAL at 08:40

## 2025-01-24 RX ADMIN — LIDOCAINE 4% 1 PATCH: 40 PATCH TOPICAL at 08:41

## 2025-01-24 RX ADMIN — Medication 2 L/MIN: at 19:51

## 2025-01-24 RX ADMIN — PANTOPRAZOLE SODIUM 40 MG: 40 TABLET, DELAYED RELEASE ORAL at 08:41

## 2025-01-24 RX ADMIN — FOLIC ACID 1 MG: 1 TABLET ORAL at 08:41

## 2025-01-24 RX ADMIN — HYDROMORPHONE HYDROCHLORIDE 4 MG: 2 INJECTION, SOLUTION INTRAMUSCULAR; INTRAVENOUS; SUBCUTANEOUS at 22:30

## 2025-01-24 RX ADMIN — HYDROMORPHONE HYDROCHLORIDE 4 MG: 2 INJECTION, SOLUTION INTRAMUSCULAR; INTRAVENOUS; SUBCUTANEOUS at 04:20

## 2025-01-24 RX ADMIN — HYDROMORPHONE HYDROCHLORIDE 2 MG: 2 INJECTION, SOLUTION INTRAMUSCULAR; INTRAVENOUS; SUBCUTANEOUS at 11:13

## 2025-01-24 RX ADMIN — HYDROMORPHONE HYDROCHLORIDE 4 MG: 2 INJECTION, SOLUTION INTRAMUSCULAR; INTRAVENOUS; SUBCUTANEOUS at 06:30

## 2025-01-24 RX ADMIN — HYDROMORPHONE HYDROCHLORIDE 4 MG: 2 INJECTION, SOLUTION INTRAMUSCULAR; INTRAVENOUS; SUBCUTANEOUS at 20:24

## 2025-01-24 RX ADMIN — HYDROCORTISONE: 1 CREAM TOPICAL at 08:41

## 2025-01-24 RX ADMIN — HYDROMORPHONE HYDROCHLORIDE 4 MG: 2 INJECTION, SOLUTION INTRAMUSCULAR; INTRAVENOUS; SUBCUTANEOUS at 00:12

## 2025-01-24 RX ADMIN — HYDROMORPHONE HYDROCHLORIDE 4 MG: 2 INJECTION, SOLUTION INTRAMUSCULAR; INTRAVENOUS; SUBCUTANEOUS at 13:26

## 2025-01-24 RX ADMIN — HYDROMORPHONE HYDROCHLORIDE 4 MG: 2 INJECTION, SOLUTION INTRAMUSCULAR; INTRAVENOUS; SUBCUTANEOUS at 08:35

## 2025-01-24 RX ADMIN — HYDROMORPHONE HYDROCHLORIDE 4 MG: 2 INJECTION, SOLUTION INTRAMUSCULAR; INTRAVENOUS; SUBCUTANEOUS at 15:37

## 2025-01-24 RX ADMIN — HYDROMORPHONE HYDROCHLORIDE 4 MG: 2 INJECTION, SOLUTION INTRAMUSCULAR; INTRAVENOUS; SUBCUTANEOUS at 17:58

## 2025-01-24 RX ADMIN — HYDROMORPHONE HYDROCHLORIDE 1 MG: 1 INJECTION, SOLUTION INTRAMUSCULAR; INTRAVENOUS; SUBCUTANEOUS at 11:13

## 2025-01-24 RX ADMIN — LIDOCAINE 4% 1 PATCH: 40 PATCH TOPICAL at 11:14

## 2025-01-24 RX ADMIN — HYDROMORPHONE HYDROCHLORIDE 4 MG: 2 INJECTION, SOLUTION INTRAMUSCULAR; INTRAVENOUS; SUBCUTANEOUS at 02:14

## 2025-01-24 ASSESSMENT — COGNITIVE AND FUNCTIONAL STATUS - GENERAL
DAILY ACTIVITIY SCORE: 24
MOBILITY SCORE: 24
DAILY ACTIVITIY SCORE: 24
MOBILITY SCORE: 24

## 2025-01-24 ASSESSMENT — PAIN SCALES - GENERAL
PAINLEVEL_OUTOF10: 9
PAINLEVEL_OUTOF10: 10 - WORST POSSIBLE PAIN
PAINLEVEL_OUTOF10: 10 - WORST POSSIBLE PAIN
PAINLEVEL_OUTOF10: 9
PAINLEVEL_OUTOF10: 10 - WORST POSSIBLE PAIN
PAINLEVEL_OUTOF10: 9

## 2025-01-24 ASSESSMENT — PAIN DESCRIPTION - LOCATION
LOCATION: GENERALIZED

## 2025-01-24 ASSESSMENT — PAIN - FUNCTIONAL ASSESSMENT
PAIN_FUNCTIONAL_ASSESSMENT: 0-10
PAIN_FUNCTIONAL_ASSESSMENT: 0-10

## 2025-01-24 NOTE — CARE PLAN
The patient's goals for the shift include Rest and Comfort    The clinical goals for the shift include Pt will remain HDS and VSS throughout shift 1/24/25 by 0700.      Problem: Pain - Adult  Goal: Verbalizes/displays adequate comfort level or baseline comfort level  Outcome: Progressing     Problem: Safety - Adult  Goal: Free from fall injury  Outcome: Progressing     Problem: Discharge Planning  Goal: Discharge to home or other facility with appropriate resources  Outcome: Progressing     Problem: Chronic Conditions and Co-morbidities  Goal: Patient's chronic conditions and co-morbidity symptoms are monitored and maintained or improved  Outcome: Progressing     Problem: Pain  Goal: Takes deep breaths with improved pain control throughout the shift  Outcome: Progressing  Goal: Turns in bed with improved pain control throughout the shift  Outcome: Progressing  Goal: Walks with improved pain control throughout the shift  Outcome: Progressing  Goal: Performs ADL's with improved pain control throughout shift  Outcome: Progressing  Goal: Participates in PT with improved pain control throughout the shift  Outcome: Progressing  Goal: Free from opioid side effects throughout the shift  Outcome: Progressing  Goal: Free from acute confusion related to pain meds throughout the shift  Outcome: Progressing

## 2025-01-24 NOTE — CARE PLAN
The patient's goals for the shift include Rest and Comfort    The clinical goals for the shift include not having any s/s of infection in his bones

## 2025-01-24 NOTE — PROGRESS NOTES
"Joellen Narayan is a 24 y.o. male on day 5 of admission presenting with Sickle cell pain crisis (Multi).    Subjective   Seen this morning at bedside. Discussed MR results and US UE negative for clot. Patient really wants to go home but understands the concern for osteo and further imaging. He is aware that he has to stay for the weekend for nuclear med testing. Initially, he wanted to start rotating with home pain medications but changed his mind and would like to stay on the same regimen today.     He is still denying abdominal pain. Patient agreeable to plan and had no further questions. LBM 1/23. Urinating and drinking okay. Denies chest pain, SOB, N/V, abd pain.     Objective     Physical Exam  Constitutional:       Appearance: Normal appearance.   HENT:      Head: Normocephalic and atraumatic.   Eyes:      Extraocular Movements: Extraocular movements intact.   Cardiovascular:      Rate and Rhythm: Normal rate and regular rhythm.   Pulmonary:      Effort: Pulmonary effort is normal.      Breath sounds: Normal breath sounds.   Abdominal:      General: Abdomen is flat.      Palpations: Abdomen is soft.      Tenderness: There is abdominal tenderness (RLQ).   Musculoskeletal:         General: Normal range of motion.      Comments: Tenderness to right shin   Skin:     General: Skin is warm and dry.   Neurological:      General: No focal deficit present.      Mental Status: He is alert and oriented to person, place, and time.   Psychiatric:         Mood and Affect: Mood normal.         Behavior: Behavior normal.         Last Recorded Vitals  Blood pressure 123/58, pulse 84, temperature 36.6 °C (97.9 °F), temperature source Temporal, resp. rate 18, height 1.88 m (6' 2\"), weight 72.6 kg (160 lb), SpO2 95%.  Intake/Output last 3 Shifts:  I/O last 3 completed shifts:  In: 1000 (13.8 mL/kg) [P.O.:1000]  Out: 975 (13.4 mL/kg) [Urine:975 (0.4 mL/kg/hr)]  Weight: 72.6 kg     Relevant Results  XR chest 2 views  Result Date: " 1/19/2025  Cardiomegaly but no evidence of other acute intrathoracic abnormality.   Signed by: Wyatt Hicks 1/19/2025 8:57 AM Dictation workstation:   [id number]     right upper quadrant  Result Date: 12/28/2024  Cholelithiasis without evidence of acute cholecystitis.   Hepatomegaly with hepatic steatosis.   I personally reviewed the images/study and I agree with the findings as stated by resident physician Dr. José Miguel Flowers . This study was interpreted at [place], [place]..   MACRO: None   Signed by: Jamshid Enrique 12/28/2024 7:43 AM Dictation workstation:   [id number]    Scheduled medications  enoxaparin, 40 mg, subcutaneous, q24h  folic acid, 1 mg, oral, Daily  hydrocortisone, , Topical, BID  [START ON 1/25/2025] lidocaine, 1 patch, transdermal, Daily  lidocaine, 1 patch, transdermal, Once  lidocaine, 2 patch, transdermal, Daily  oxygen, , inhalation, Continuous - Inhalation  pantoprazole, 40 mg, oral, Daily before breakfast  polyethylene glycol, 17 g, oral, Daily  sennosides-docusate sodium, 2 tablet, oral, BID      Continuous medications       PRN medications  PRN medications: diphenhydrAMINE, HYDROmorphone, naloxone, ondansetron, pseudoephedrine     Assessment/Plan   Assessment & Plan  Hodgkin's paragranuloma (Multi)    Sickle cell anemia with pain (Multi)    Joellen Narayan is a 24 y.o. 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 Hgb and lysis labs at baseline. CXR (1/19) without acute process. Admitted for further management on pain. Started on IV  dilaudid per care plan and transitioned to dPCA pump on 1/21 for further pain control. Xray right leg and left shoulder unremarkable, no AVN (1/21). Bilirubin and lysis labs worsening (1/21), CRP elevated. MRI left shoulder and right tib/fib (1/23) concerning for osteomyelitis vs sequelae of sickle cell disease. HDS, afebrile since 1/21. Blood cultures from 1/21 NGTD. Ortho consulted, no acute interventions, recommended ID consult. ID consulted (1/23), okay to monitor off antibiotics and recommended a nuclear bone scan. If patient were to become HD unstable or febrile, re-culture and start broad spectrum antibiotics. Nuclear bone scan tentatively scheduled for 1/27 or 1/28. On 1/23, patient transitioned off dPCA and restarted on Q2hr pain medication. DC pending improvement in pain and osteomyelitis workup.       # Updates 1/24:   - kept pain regimen the same; 4mg IVP dilaudid Q2hrs (1/23- )   - T-bili elevated, slowly downtrending; CTM, denies abdominal pain, N/V   - MRIs c/f osteomyelitits  - ID consulted; okay to monitor off antibiotics, follow blood cultures from 1/21 (NGTD), pursue nuclear imaging to further assess osteomyelitis   - Per nuclear med; ordered for a In111 WBC scan set up for Monday, blood  at 8am. Then they will reinject the blood sometime around noon and will scan him on Tues 1/28. No prep is needed/ does not need to be NPO   - trend CRP per Dr. Cruz     # C/f osteomyelitis   # isolated fever   - febrile 1/20, max temp 38.1  - blood cultures 1/21 NGTD   - UA/urine cult negative 1/21  - 1/21 Xray right leg and left shoulder unremarkable, no AVN, may need MRI of leg pain persists    - sed rate negative (1/21)  - CRP 13.31 (1/21) --> 12.66 (1/22) --> 12.62 (1/23) --> 12.77 (1/24); trend daily per Dr. Cruz   - 1/22 reporting improvement in pain but will order MRI left shoulder and MRI right tib/fib to rule out osteonecrosis vs osteomyelitis given worsening lysis labs and elevated CRP  - Right  ankle chronic wound, small tear from scratching; no signs of infection, swelling or drainage; CTM  - MR right tib/fib (1/23) signal intensity seen in the tibia. difficult to delineate whether or not the changes are related to sickle cell disease and/or osteomyelitis and it is possible that both processes are present. A dual isotope nuclear medicine study may be helpful for further assessment. Edema in the pretibial soft tissues and surrounding muscles favored to be reactive with infectious myositis not entirely excluded.  - MR left shoulder (1/23) signal abnormality of the intramedullary cavity of the humeral diaphysis with associated periosteal reaction. This could be related to patient's known sickle cell disease although osteomyelitis is another diagnostic consideration. Both could be present. MRI of the humerus is recommended to assess the entirety of the process.  - MR left humerus (1/23): Diffuse heterogeneity of the humeral diaphysis extending to the humeral condyles with bone marrow edema and associated periosteal edema. findings could be related to the patient's known sickle cell crisis and represent osteonecrosis. Superimposed osteomyelitis remains in the differential. Dual isotope nuclear medicine study could be helpful for further assessment given that this process is multifocal and involves the tibia as well. Edema in the perihumeral soft tissues and surrounding muscles could be reactive however infectious myositis is not entirely excluded. Edema about the radial nerve and adjacent vasculature. There is questionable abscess of flow void of adjacent venous structures which may reflect thrombophlebitis. Consider Doppler ultrasound of the left upper extremity.  - 1/23 CK negative low concern for myositis  - Vasc US duplex UE (1/24) negative for DVT   - Ortho consulted (1/23) No acute orthopaedic surgical intervention, Recommend ID consult for recommendations, If c/f osteo per ID, recommend IV abx, Ortho on  standby for bony debridement, WBAT RLE and LUE  - ID consulted (1/23) okay to monitor off antibiotics, follow blood cultures from 1/21 (NGTD), pursue nuclear imaging to further assess osteomyelitis   - Per nuclear medicine; ordered for a In111 WBC scan set up for Monday, blood  at 8am. Then they will reinject the blood sometime around noon and will scan him on Tues 1/28. No prep is needed/ does not need to be NPO   - NM bone marrow whole body pending (to be done Tuesday 1/28) per nuclear medicine attending, Dr. Skelton   - NM Inflammation whole body with indium 111 ordered per nuclear medicine attending, Dr. Skelton   - If patient becomes HD unstable or febrile; re-culture and cover broad spectrum      # Hgb SS with Pain  - OARRS reviewed, no aberrancies  - No current care path  - Hgb baseline ~8.5; Hgb 10.7 (1/19); no indication for transfusion  - Tbili baseline fluctuates ~5 (planning for cholecystectomy 1/30); Tbili 7.2 (1/19), T.bili elevated to 12.8, direct 1.9   - LDH baseline ~500;  (1/19) --> 450 (1/22)   - s/p 1L IV LR in ED 1/19, plan IV hydration with D51/2 X 12 hrs   - s/p IV dilaudid 3mg q2hrs PRN severe pain, now increased to IV Dilaudid 4mg q2 hrs PRN (1/20)  - s/p dPCA for uncontrolled pain (1/21-1/22)  - 1/23 Transitioned off PCA and restarted 4mg IVP dilaudid Q2hrs   - Started IV Toradol 15 mg q6 X 3 days with PPI (1/19)   - Started Lidocaine patch   - Continue home folic acid 1mg daily  - Pre exchange labs sent 1/19 d/t reported severity of pain  - Hgb S (1/19): 58.6%   - Utox (1/19): positive for cannabinoid   - PO Zofran PRN for opioid-induced nausea and PO Benadryl PRN for opioid-induced pruritus  - Bowel regimen for opioid-induced constipation with DocuSenna 2tabs BID and Miralax daily; LBM 1/23   - Monitor need for exchange if labs and pain worsens, exchange labs sent 1/21      # Hx of Priapism  - Recent admission c/b worsening priapism needing urgent [facility] (9/19)  - Denies  recurrent issues with priapism outpt recently  - Continue Sudafed 60mg q8h PRN for priapism      # Recent Choledocholithiasis  # Plan for Cholecystectomy 1/31/25  - s/p ERCP 7/18/24 with a biliary sphincterotomy where sludge and stones were removed, achieving complete clearance  - Seen by gen surg 8/8/24 Dr. Dove who rec lap cholecystectomy d/t the chance of recurrence of choledocholithiasis  - Tbili baseline fluctuates ~5; Tbili 7.2 (1/19) --> 13.8 (1/22) --> 11.4 (1/24)   - direct bili 1.9 (1/21)   - No active abd pain on admit; low threshold for CT a/p or RUQ US if abdominal pain occurs  - Planning for cholecystectomy outpt 1/31/25     # Nodular lymphocyte predominant Hodgkins lymphoma (NLPHL)   - Primary Oncologist: Dr. Stoll  - Enlarged lymph nodes noted 4/1/22  - RUQ US (11/14/22) with mildly enlarged LNs in the region of the kavin hepatis  - MRI liver (11/16/22) showed re-demonstration of bulky retroperitoneal lymphadenopathy and kavin hepatic lymphadenopathy    - (11/18/22) lymph node biopsy showed atypical lymphoid infiltrate. Reviewed by Hemepath board, insufficient for lymphoma diagnosis  - PET/CT (12/6/22) showing retroperitoneal lymphadenopathy  - Followed up with Dr. Stoll (12/16/22) with plan for surg/onc consult for large tissue bx but patient missed apt and was lost to follow up  - Requested new apt with Dr. Ronnie Marte 6/19/23, patient was not seen and lost to follow up  - CT a/p (11/28/23) increased size of retroperitoneal lymph nodes reflecting extramedullary hematopoiesis I/s/o sickle cell vs lymphoma  - Paraaortic LN bx via IR (11/30/23) consistent with NLPHL. Flow: no clonal B cell or T cell population identified, lymphocyte 95%, CD3+CD4+ 68%, CD3+CD8+ 7%, CD19+ 24%  - Elevated LDH/bili partially from sickle cell disease   - Chemotherapy (R-CHOP) was discussed with primary oncologist Dr. Stoll, and decision was made to simplify his chemotherapy to Rituxan and prednisone q3 weeks mainly  "due to frequent sickle cell crisis  - Current chemo regimen: Rituxan and prednisone q3 weeks (C1 1/18/24, C2 2/8/24, Missed C3 d/t sickle cell pain crisis, C4 4/4/24, C5 5/16/24, C6 6/7/24)  - Per pt no longer taking Acyclovir 400mg BID prophy   - PET CT (7/25) overall showing great response to treatment with persistent residual viable disease involving a left common iliac node  - PET/CT (11/18) showing Interval increased metabolic activity within upper abdominal and  bilateral inguinal lymph nodes compatible with interval worsening of lymphomatous disease  - Last FUV with Dr. Stoll 11/21, d/w pt and mother regarding tx options vs observation. No current treatment at this time and will continue with observation and plan for repeat CT/PET in 3 months and follow up after that  - Next PET/CT 3/4, Dr. Stoll FUV 3/13     # Hx of nocturnal oxygen dependence and hypoxia on room air  - Pt currently has home 2L supp O2   - Wean as able, encourage incentive spirometry  - Pulm FUV 8/8- \"Given his history of sickle cell disease, it is important to ensure he has formal sleep testing to look at desaturations and presence of sleep apnea despite not having a convincing history/body habitus typical for suspecting sleep apnea- patients with sickle cell have a higher prevalence of sleep disorders including nocturnal hypoxemia\"--> rec sleep referral for formal testing if deemed appropriate, ABG and O2 destat testing, and TTE with bubble study  - TTE 8/23 showing EF 60-65%, severely dilated LV and LA, + intrapulmonary shunting  - Has Pulm FUV 2/13  - Pt currently on 2L supp O2  - Encourage IS     # DVT prophy:   - Lovenox subcutaneous, SCDs, encourage ambulation     # DISPO:  - Full code, confirmed on admit  - DC pending improvement in pain and osteo workup   - SUSIE Narayan (Parent) [phone]  - Cardiology FUV 1/28, Cholecystectomy 1/31, Pulm EPV 2/13, PET/CT 3/4, Dr. Stoll FUV 3/13, Sickle Cell FUV 4/9    I spent 60 " minutes in the professional and overall care of this patient.    Assessment and plan as above discussed with attending physician Dr. Bren Arana, APRN-CNP

## 2025-01-25 LAB
ALBUMIN SERPL BCP-MCNC: 4.7 G/DL (ref 3.4–5)
ALP SERPL-CCNC: 214 U/L (ref 33–120)
ALT SERPL W P-5'-P-CCNC: 37 U/L (ref 10–52)
ANION GAP SERPL CALC-SCNC: 13 MMOL/L (ref 10–20)
AST SERPL W P-5'-P-CCNC: 52 U/L (ref 9–39)
BACTERIA BLD CULT: NORMAL
BACTERIA BLD CULT: NORMAL
BASOPHILS # BLD AUTO: 0.09 X10*3/UL (ref 0–0.1)
BASOPHILS NFR BLD AUTO: 0.8 %
BILIRUB SERPL-MCNC: 9.4 MG/DL (ref 0–1.2)
BUN SERPL-MCNC: 8 MG/DL (ref 6–23)
CALCIUM SERPL-MCNC: 10.1 MG/DL (ref 8.6–10.6)
CHLORIDE SERPL-SCNC: 100 MMOL/L (ref 98–107)
CO2 SERPL-SCNC: 27 MMOL/L (ref 21–32)
CREAT SERPL-MCNC: 0.6 MG/DL (ref 0.5–1.3)
CRP SERPL-MCNC: 9.85 MG/DL
EGFRCR SERPLBLD CKD-EPI 2021: >90 ML/MIN/1.73M*2
EOSINOPHIL # BLD AUTO: 0.64 X10*3/UL (ref 0–0.7)
EOSINOPHIL NFR BLD AUTO: 5.4 %
ERYTHROCYTE [DISTWIDTH] IN BLOOD BY AUTOMATED COUNT: 21.2 % (ref 11.5–14.5)
GLUCOSE SERPL-MCNC: 82 MG/DL (ref 74–99)
HCT VFR BLD AUTO: 25.8 % (ref 41–52)
HGB BLD-MCNC: 9 G/DL (ref 13.5–17.5)
HGB RETIC QN: 28 PG (ref 28–38)
IMM GRANULOCYTES # BLD AUTO: 0.12 X10*3/UL (ref 0–0.7)
IMM GRANULOCYTES NFR BLD AUTO: 1 % (ref 0–0.9)
IMMATURE RETIC FRACTION: 22.5 %
LDH SERPL L TO P-CCNC: 476 U/L (ref 84–246)
LYMPHOCYTES # BLD AUTO: 2.51 X10*3/UL (ref 1.2–4.8)
LYMPHOCYTES NFR BLD AUTO: 21.3 %
MCH RBC QN AUTO: 28.6 PG (ref 26–34)
MCHC RBC AUTO-ENTMCNC: 34.9 G/DL (ref 32–36)
MCV RBC AUTO: 82 FL (ref 80–100)
MONOCYTES # BLD AUTO: 1.45 X10*3/UL (ref 0.1–1)
MONOCYTES NFR BLD AUTO: 12.3 %
NEUTROPHILS # BLD AUTO: 6.95 X10*3/UL (ref 1.2–7.7)
NEUTROPHILS NFR BLD AUTO: 59.2 %
NRBC BLD-RTO: 1 /100 WBCS (ref 0–0)
PLATELET # BLD AUTO: 240 X10*3/UL (ref 150–450)
POLYCHROMASIA BLD QL SMEAR: NORMAL
POTASSIUM SERPL-SCNC: 4.3 MMOL/L (ref 3.5–5.3)
PROT SERPL-MCNC: 7.8 G/DL (ref 6.4–8.2)
RBC # BLD AUTO: 3.15 X10*6/UL (ref 4.5–5.9)
RBC MORPH BLD: NORMAL
RETICS #: 0.51 X10*6/UL (ref 0.02–0.12)
RETICS/RBC NFR AUTO: 16.2 % (ref 0.5–2)
SICKLE CELLS BLD QL SMEAR: NORMAL
SODIUM SERPL-SCNC: 136 MMOL/L (ref 136–145)
TARGETS BLD QL SMEAR: NORMAL
WBC # BLD AUTO: 11.8 X10*3/UL (ref 4.4–11.3)

## 2025-01-25 PROCEDURE — 2500000001 HC RX 250 WO HCPCS SELF ADMINISTERED DRUGS (ALT 637 FOR MEDICARE OP): Performed by: PHYSICIAN ASSISTANT

## 2025-01-25 PROCEDURE — 1170000001 HC PRIVATE ONCOLOGY ROOM DAILY

## 2025-01-25 PROCEDURE — 85025 COMPLETE CBC W/AUTO DIFF WBC: CPT | Performed by: NURSE PRACTITIONER

## 2025-01-25 PROCEDURE — 83615 LACTATE (LD) (LDH) ENZYME: CPT | Performed by: NURSE PRACTITIONER

## 2025-01-25 PROCEDURE — 2500000004 HC RX 250 GENERAL PHARMACY W/ HCPCS (ALT 636 FOR OP/ED): Performed by: PHYSICIAN ASSISTANT

## 2025-01-25 PROCEDURE — 36415 COLL VENOUS BLD VENIPUNCTURE: CPT | Performed by: NURSE PRACTITIONER

## 2025-01-25 PROCEDURE — 80053 COMPREHEN METABOLIC PANEL: CPT | Performed by: NURSE PRACTITIONER

## 2025-01-25 PROCEDURE — 86140 C-REACTIVE PROTEIN: CPT

## 2025-01-25 PROCEDURE — 2500000001 HC RX 250 WO HCPCS SELF ADMINISTERED DRUGS (ALT 637 FOR MEDICARE OP): Performed by: NURSE PRACTITIONER

## 2025-01-25 PROCEDURE — 85045 AUTOMATED RETICULOCYTE COUNT: CPT | Performed by: NURSE PRACTITIONER

## 2025-01-25 PROCEDURE — 2500000005 HC RX 250 GENERAL PHARMACY W/O HCPCS

## 2025-01-25 PROCEDURE — 2500000004 HC RX 250 GENERAL PHARMACY W/ HCPCS (ALT 636 FOR OP/ED): Mod: JZ

## 2025-01-25 PROCEDURE — 99233 SBSQ HOSP IP/OBS HIGH 50: CPT | Performed by: PHYSICIAN ASSISTANT

## 2025-01-25 PROCEDURE — 2500000005 HC RX 250 GENERAL PHARMACY W/O HCPCS: Performed by: NURSE PRACTITIONER

## 2025-01-25 RX ORDER — HYDROMORPHONE HYDROCHLORIDE 2 MG/ML
5 INJECTION, SOLUTION INTRAMUSCULAR; INTRAVENOUS; SUBCUTANEOUS EVERY 2 HOUR PRN
Status: DISCONTINUED | OUTPATIENT
Start: 2025-01-25 | End: 2025-01-25

## 2025-01-25 RX ORDER — TALC
3 POWDER (GRAM) TOPICAL ONCE
Status: COMPLETED | OUTPATIENT
Start: 2025-01-25 | End: 2025-01-25

## 2025-01-25 RX ORDER — TALC
3 POWDER (GRAM) TOPICAL NIGHTLY PRN
Status: DISCONTINUED | OUTPATIENT
Start: 2025-01-25 | End: 2025-01-28 | Stop reason: HOSPADM

## 2025-01-25 RX ADMIN — HYDROMORPHONE HYDROCHLORIDE 5 MG: 2 INJECTION, SOLUTION INTRAMUSCULAR; INTRAVENOUS; SUBCUTANEOUS at 10:38

## 2025-01-25 RX ADMIN — PANTOPRAZOLE SODIUM 40 MG: 40 TABLET, DELAYED RELEASE ORAL at 10:37

## 2025-01-25 RX ADMIN — LIDOCAINE 2 PATCH: 560 PATCH PERCUTANEOUS; TOPICAL; TRANSDERMAL at 11:54

## 2025-01-25 RX ADMIN — LIDOCAINE 1 PATCH: 560 PATCH PERCUTANEOUS; TOPICAL; TRANSDERMAL at 10:37

## 2025-01-25 RX ADMIN — Medication 2 L/MIN: at 20:19

## 2025-01-25 RX ADMIN — HYDROMORPHONE HYDROCHLORIDE 5 MG: 2 INJECTION, SOLUTION INTRAMUSCULAR; INTRAVENOUS; SUBCUTANEOUS at 13:02

## 2025-01-25 RX ADMIN — HYDROMORPHONE HYDROCHLORIDE 4 MG: 2 INJECTION, SOLUTION INTRAMUSCULAR; INTRAVENOUS; SUBCUTANEOUS at 04:44

## 2025-01-25 RX ADMIN — FOLIC ACID 1 MG: 1 TABLET ORAL at 10:42

## 2025-01-25 RX ADMIN — HYDROMORPHONE HYDROCHLORIDE 4 MG: 2 INJECTION, SOLUTION INTRAMUSCULAR; INTRAVENOUS; SUBCUTANEOUS at 08:16

## 2025-01-25 RX ADMIN — SENNOSIDES AND DOCUSATE SODIUM 2 TABLET: 50; 8.6 TABLET ORAL at 10:37

## 2025-01-25 RX ADMIN — HYDROCORTISONE: 1 CREAM TOPICAL at 20:17

## 2025-01-25 RX ADMIN — HYDROMORPHONE HYDROCHLORIDE 4 MG: 2 INJECTION, SOLUTION INTRAMUSCULAR; INTRAVENOUS; SUBCUTANEOUS at 02:37

## 2025-01-25 RX ADMIN — HYDROCORTISONE: 1 CREAM TOPICAL at 10:43

## 2025-01-25 RX ADMIN — HYDROMORPHONE HYDROCHLORIDE 5 MG: 2 INJECTION, SOLUTION INTRAMUSCULAR; INTRAVENOUS; SUBCUTANEOUS at 21:55

## 2025-01-25 RX ADMIN — HYDROMORPHONE HYDROCHLORIDE 4 MG: 2 INJECTION, SOLUTION INTRAMUSCULAR; INTRAVENOUS; SUBCUTANEOUS at 00:30

## 2025-01-25 RX ADMIN — HYDROMORPHONE HYDROCHLORIDE 5 MG: 2 INJECTION, SOLUTION INTRAMUSCULAR; INTRAVENOUS; SUBCUTANEOUS at 17:33

## 2025-01-25 RX ADMIN — Medication 2 L/MIN: at 10:43

## 2025-01-25 RX ADMIN — HYDROMORPHONE HYDROCHLORIDE 5 MG: 2 INJECTION, SOLUTION INTRAMUSCULAR; INTRAVENOUS; SUBCUTANEOUS at 19:55

## 2025-01-25 RX ADMIN — MELATONIN 3 MG: 3 TAB ORAL at 02:53

## 2025-01-25 RX ADMIN — HYDROMORPHONE HYDROCHLORIDE 5 MG: 2 INJECTION, SOLUTION INTRAMUSCULAR; INTRAVENOUS; SUBCUTANEOUS at 15:28

## 2025-01-25 RX ADMIN — HYDROMORPHONE HYDROCHLORIDE 4 MG: 2 INJECTION, SOLUTION INTRAMUSCULAR; INTRAVENOUS; SUBCUTANEOUS at 06:14

## 2025-01-25 ASSESSMENT — COGNITIVE AND FUNCTIONAL STATUS - GENERAL
MOBILITY SCORE: 24
MOBILITY SCORE: 24
DAILY ACTIVITIY SCORE: 24
DAILY ACTIVITIY SCORE: 24

## 2025-01-25 ASSESSMENT — PAIN SCALES - GENERAL
PAINLEVEL_OUTOF10: 10 - WORST POSSIBLE PAIN
PAINLEVEL_OUTOF10: 10 - WORST POSSIBLE PAIN
PAINLEVEL_OUTOF10: 4
PAINLEVEL_OUTOF10: 9
PAINLEVEL_OUTOF10: 10 - WORST POSSIBLE PAIN
PAINLEVEL_OUTOF10: 10 - WORST POSSIBLE PAIN
PAINLEVEL_OUTOF10: 8
PAINLEVEL_OUTOF10: 10 - WORST POSSIBLE PAIN
PAINLEVEL_OUTOF10: 5 - MODERATE PAIN
PAINLEVEL_OUTOF10: 10 - WORST POSSIBLE PAIN
PAINLEVEL_OUTOF10: 4
PAINLEVEL_OUTOF10: 10 - WORST POSSIBLE PAIN
PAINLEVEL_OUTOF10: 8
PAINLEVEL_OUTOF10: 10 - WORST POSSIBLE PAIN
PAINLEVEL_OUTOF10: 9
PAINLEVEL_OUTOF10: 4
PAINLEVEL_OUTOF10: 10 - WORST POSSIBLE PAIN

## 2025-01-25 ASSESSMENT — PAIN DESCRIPTION - LOCATION
LOCATION: ARM
LOCATION: ARM

## 2025-01-25 ASSESSMENT — PAIN SCALES - WONG BAKER
WONGBAKER_NUMERICALRESPONSE: HURTS LITTLE BIT
WONGBAKER_NUMERICALRESPONSE: HURTS WHOLE LOT
WONGBAKER_NUMERICALRESPONSE: HURTS LITTLE MORE

## 2025-01-25 ASSESSMENT — PAIN SCALES - PAIN ASSESSMENT IN ADVANCED DEMENTIA (PAINAD)
TOTALSCORE: MEDICATION (SEE MAR)
TOTALSCORE: MEDICATION (SEE MAR)

## 2025-01-25 NOTE — ASSESSMENT & PLAN NOTE
Joellen Narayan is a 24 y.o. 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 Hgb and lysis labs at baseline. CXR (1/19) without acute process. Admitted for further management on pain. Started on IV dilaudid per care plan and transitioned to dPCA pump on 1/21 for further pain control. Xray right leg and left shoulder unremarkable, no AVN (1/21). Bilirubin and lysis labs worsening (1/21), CRP elevated. MRI left shoulder and right tib/fib (1/23) concerning for osteomyelitis vs sequelae of sickle cell disease. HDS, afebrile since 1/21. Blood cultures from 1/21 NGTD. Ortho consulted, no acute interventions, recommended ID consult. ID consulted (1/23), okay to monitor off antibiotics and recommended a nuclear bone scan. If patient were to become HD unstable or febrile, re-culture and start broad spectrum antibiotics. Nuclear bone scan tentatively scheduled for 1/27 or 1/28. On 1/23, patient transitioned off dPCA and restarted on Q2hr pain medication. DC pending improvement in pain and osteomyelitis workup.       # Updates 1/25:   - Increased IV Dilaudid to 5mg Q2hrs  - Per nuclear med; ordered for a In111 WBC scan set up for Monday, blood  at 8am. Then they will reinject the blood sometime around noon and will scan him on Tues 1/28. No prep is needed/ does not need to be NPO   - trend CRP per Dr. Cruz      # C/f osteomyelitis   # Isolated fever   - febrile 1/20, max temp 38.1  - blood cultures 1/21 NGTD   - UA/urine cult negative 1/21  - 1/21 Xray right leg and left shoulder unremarkable, no AVN, may need MRI of leg  pain persists    - sed rate negative (1/21)  - CRP 13.31 (1/21) --> 12.66 (1/22) --> 12.62 (1/23) --> 12.77 (1/24); trend daily per Dr. Cruz   - 1/22 reporting improvement in pain but will order MRI left shoulder and MRI right tib/fib to rule out osteonecrosis vs osteomyelitis given worsening lysis labs and elevated CRP  - Right ankle chronic wound, small tear from scratching; no signs of infection, swelling or drainage; CTM  - MR right tib/fib (1/23) signal intensity seen in the tibia. difficult to delineate whether or not the changes are related to sickle cell disease and/or osteomyelitis and it is possible that both processes are present. A dual isotope nuclear medicine study may be helpful for further assessment. Edema in the pretibial soft tissues and surrounding muscles favored to be reactive with infectious myositis not entirely excluded.  - MR left shoulder (1/23) signal abnormality of the intramedullary cavity of the humeral diaphysis with associated periosteal reaction. This could be related to patient's known sickle cell disease although osteomyelitis is another diagnostic consideration. Both could be present. MRI of the humerus is recommended to assess the entirety of the process.  - MR left humerus (1/23): Diffuse heterogeneity of the humeral diaphysis extending to the humeral condyles with bone marrow edema and associated periosteal edema. findings could be related to the patient's known sickle cell crisis and represent osteonecrosis. Superimposed osteomyelitis remains in the differential. Dual isotope nuclear medicine study could be helpful for further assessment given that this process is multifocal and involves the tibia as well. Edema in the perihumeral soft tissues and surrounding muscles could be reactive however infectious myositis is not entirely excluded. Edema about the radial nerve and adjacent vasculature. There is questionable abscess of flow void of adjacent venous structures which may  reflect thrombophlebitis. Consider Doppler ultrasound of the left upper extremity.  - 1/23 CK negative low concern for myositis  - Vasc US duplex UE (1/24) negative for DVT   - Ortho consulted (1/23) No acute orthopaedic surgical intervention, Recommend ID consult for recommendations, If c/f osteo per ID, recommend IV abx, Ortho on standby for bony debridement, WBAT RLE and LUE  - ID consulted (1/23) okay to monitor off antibiotics, follow blood cultures from 1/21 (NGTD), pursue nuclear imaging to further assess osteomyelitis   - Per nuclear medicine; ordered for a In111 WBC scan set up for Monday, blood  at 8am. Then they will reinject the blood sometime around noon and will scan him on Tues 1/28. No prep is needed/ does not need to be NPO   - NM bone marrow whole body pending (to be done Tuesday 1/28) per nuclear medicine attending, Dr. Skelton   - NM Inflammation whole body with indium 111 ordered per nuclear medicine attending, Dr. Skelton   - If patient becomes HD unstable or febrile; re-culture and cover broad spectrum      # Hgb SS with Pain  - OARRS reviewed, no aberrancies  - No current care path  - Hgb baseline ~8.5; Hgb 10.7 (1/19); no indication for transfusion  - Tbili baseline fluctuates ~5 (planning for cholecystectomy 1/30); Tbili 7.2 (1/19), T.bili elevated to 12.8, direct 1.9   - LDH baseline ~500;  (1/19) --> 450 (1/22)   - s/p 1L IV LR in ED 1/19, plan IV hydration with D51/2 X 12 hrs   - s/p IV dilaudid 3mg q2hrs PRN severe pain, increased to IV Dilaudid 4mg q2 hrs PRN (1/20-1/24), now increased to Dilaudid 5mg q2 hrs PRN  - s/p dPCA for uncontrolled pain (1/21-1/22)  - 1/23 Transitioned off PCA and restarted 4mg IVP dilaudid Q2hrs   - Started IV Toradol 15 mg q6 X 3 days with PPI (1/19)   - Started Lidocaine patch   - Continue home folic acid 1mg daily  - Pre exchange labs sent 1/19 d/t reported severity of pain  - Hgb S (1/19): 58.6%   - Utox (1/19): positive for cannabinoid   - PO  Zofran PRN for opioid-induced nausea and PO Benadryl PRN for opioid-induced pruritus  - Bowel regimen for opioid-induced constipation with DocuSenna 2tabs BID and Miralax daily; LBM 1/23   - Monitor need for exchange if labs and pain worsens, exchange labs sent 1/21      # Hx of Priapism  - Recent admission c/b worsening priapism needing urgent [facility] (9/19)  - Denies recurrent issues with priapism outpt recently  - Continue Sudafed 60mg q8h PRN for priapism      # Recent Choledocholithiasis  # Plan for Cholecystectomy 1/31/25  - s/p ERCP 7/18/24 with a biliary sphincterotomy where sludge and stones were removed, achieving complete clearance  - Seen by gen surg 8/8/24 Dr. Dove who rec lap cholecystectomy d/t the chance of recurrence of choledocholithiasis  - Tbili baseline fluctuates ~5; Tbili 7.2 (1/19) --> 13.8 (1/22) --> 11.4 (1/24)   - direct bili 1.9 (1/21)   - No active abd pain on admit; low threshold for CT a/p or RUQ US if abdominal pain occurs  - Planning for cholecystectomy outpt 1/31/25     # Nodular lymphocyte predominant Hodgkins lymphoma (NLPHL)   - Primary Oncologist: Dr. Stoll  - Enlarged lymph nodes noted 4/1/22  - RUQ US (11/14/22) with mildly enlarged LNs in the region of the kavin hepatis  - MRI liver (11/16/22) showed re-demonstration of bulky retroperitoneal lymphadenopathy and kavin hepatic lymphadenopathy    - (11/18/22) lymph node biopsy showed atypical lymphoid infiltrate. Reviewed by Hemepath board, insufficient for lymphoma diagnosis  - PET/CT (12/6/22) showing retroperitoneal lymphadenopathy  - Followed up with Dr. Stoll (12/16/22) with plan for surg/onc consult for large tissue bx but patient missed apt and was lost to follow up  - Requested new apt with Dr. Ronnie Marte 6/19/23, patient was not seen and lost to follow up  - CT a/p (11/28/23) increased size of retroperitoneal lymph nodes reflecting extramedullary hematopoiesis I/s/o sickle cell vs lymphoma  - Paraaortic LN bx via  "IR (11/30/23) consistent with NLPHL. Flow: no clonal B cell or T cell population identified, lymphocyte 95%, CD3+CD4+ 68%, CD3+CD8+ 7%, CD19+ 24%  - Elevated LDH/bili partially from sickle cell disease   - Chemotherapy (R-CHOP) was discussed with primary oncologist Dr. Stoll, and decision was made to simplify his chemotherapy to Rituxan and prednisone q3 weeks mainly due to frequent sickle cell crisis  - Current chemo regimen: Rituxan and prednisone q3 weeks (C1 1/18/24, C2 2/8/24, Missed C3 d/t sickle cell pain crisis, C4 4/4/24, C5 5/16/24, C6 6/7/24)  - Per pt no longer taking Acyclovir 400mg BID prophy   - PET CT (7/25) overall showing great response to treatment with persistent residual viable disease involving a left common iliac node  - PET/CT (11/18) showing Interval increased metabolic activity within upper abdominal and  bilateral inguinal lymph nodes compatible with interval worsening of lymphomatous disease  - Last FUV with Dr. Stoll 11/21, d/w pt and mother regarding tx options vs observation. No current treatment at this time and will continue with observation and plan for repeat CT/PET in 3 months and follow up after that  - Next PET/CT 3/4, Dr. Stoll FUV 3/13     # Hx of nocturnal oxygen dependence and hypoxia on room air  - Pt currently has home 2L supp O2   - Wean as able, encourage incentive spirometry  - Pulm FUV 8/8- \"Given his history of sickle cell disease, it is important to ensure he has formal sleep testing to look at desaturations and presence of sleep apnea despite not having a convincing history/body habitus typical for suspecting sleep apnea- patients with sickle cell have a higher prevalence of sleep disorders including nocturnal hypoxemia\"--> rec sleep referral for formal testing if deemed appropriate, ABG and O2 destat testing, and TTE with bubble study  - TTE 8/23 showing EF 60-65%, severely dilated LV and LA, + intrapulmonary shunting  - Has Pulm FUV 2/13  - Pt currently on " 2L supp O2  - Encourage IS     # DVT prophy:   - Lovenox subcutaneous, SCDs, encourage ambulation     # DISPO:  - Full code, confirmed on admit  - DC pending improvement in pain and osteo workup   - SUSIE Narayan (Parent) [phone]  - Cardiology FUV 1/28, Cholecystectomy 1/31, Pulm EPV 2/13, PET/CT 3/4, Dr. Stoll FUV 3/13, Sickle Cell FUV 4/9

## 2025-01-25 NOTE — CARE PLAN
The clinical goals for the shift include pt will have better managed pain during my shift    Problem: Safety - Adult  Goal: Free from fall injury  Outcome: Progressing     Problem: Discharge Planning  Goal: Discharge to home or other facility with appropriate resources  Outcome: Progressing     Problem: Chronic Conditions and Co-morbidities  Goal: Patient's chronic conditions and co-morbidity symptoms are monitored and maintained or improved  Outcome: Progressing     Problem: Pain  Goal: Free from acute confusion related to pain meds throughout the shift  Outcome: Progressing

## 2025-01-25 NOTE — PROGRESS NOTES
"Joellen Narayan is a 24 y.o. male on day 6 of admission presenting with Sickle cell pain crisis (Multi).    Subjective   Seen this morning at bedside, still c/o severe pain in his left shoulders and arm, right leg pain has significantly improved.  Discussed MR results and US UE negative for clot. Also discussed plans for tagged WBC scan and bone scan next week. Today we discussed increasing IV Dilaudid to 5mg because of severe pain. Denying abdominal pain.  Urinating and drinking okay. Denies chest pain, SOB, N/V, abd pain.     Objective     Physical Exam  Constitutional:       Appearance: Normal appearance.   HENT:      Head: Normocephalic and atraumatic.   Eyes:      Extraocular Movements: Extraocular movements intact.   Cardiovascular:      Rate and Rhythm: Normal rate and regular rhythm.   Pulmonary:      Effort: Pulmonary effort is normal.      Breath sounds: Normal breath sounds.   Abdominal:      General: Abdomen is flat.      Palpations: Abdomen is soft.      Tenderness: There is no abdominal tenderness.   Musculoskeletal:         General: Normal range of motion.      Comments: Tenderness to right shin and left arm   Skin:     General: Skin is warm and dry.   Neurological:      General: No focal deficit present.      Mental Status: He is alert and oriented to person, place, and time.   Psychiatric:         Mood and Affect: Mood normal.         Behavior: Behavior normal.         Last Recorded Vitals  Blood pressure 147/79, pulse 82, temperature 37 °C (98.6 °F), temperature source Temporal, resp. rate 18, height 1.88 m (6' 2\"), weight 72.6 kg (160 lb), SpO2 94%.  Intake/Output last 3 Shifts:  I/O last 3 completed shifts:  In: - (0 mL/kg)   Out: 925 (12.7 mL/kg) [Urine:925 (0.4 mL/kg/hr)]  Weight: 72.6 kg     Relevant Results  XR chest 2 views  Result Date: 1/19/2025  Cardiomegaly but no evidence of other acute intrathoracic abnormality.   Signed by: Wyatt Hicks 1/19/2025 8:57 AM Dictation workstation:   " [id number]     right upper quadrant  Result Date: 12/28/2024  Cholelithiasis without evidence of acute cholecystitis.   Hepatomegaly with hepatic steatosis.   I personally reviewed the images/study and I agree with the findings as stated by resident physician Dr. José Miguel Flowers . This study was interpreted at [facility], [place], [place]..   MACRO: None   Signed by: Jamshid Enrique 12/28/2024 7:43 AM Dictation workstation:   [id number]    Scheduled medications  enoxaparin, 40 mg, subcutaneous, q24h  folic acid, 1 mg, oral, Daily  hydrocortisone, , Topical, BID  lidocaine, 1 patch, transdermal, Daily  lidocaine, 2 patch, transdermal, Daily  oxygen, , inhalation, Continuous - Inhalation  pantoprazole, 40 mg, oral, Daily before breakfast  polyethylene glycol, 17 g, oral, Daily  sennosides-docusate sodium, 2 tablet, oral, BID      Continuous medications       PRN medications  PRN medications: diphenhydrAMINE, HYDROmorphone, melatonin, naloxone, ondansetron, pseudoephedrine     Assessment/Plan   Assessment & Plan  Hodgkin's paragranuloma (Multi)    Sickle cell anemia with pain (Multi)  Joellen Narayan is a 24 y.o. 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 Hgb and lysis labs at baseline. CXR (1/19) without acute process. Admitted for further management on pain. Started on IV dilaudid per care plan and transitioned to dPCA pump on 1/21 for further pain control. Xray right leg and left shoulder unremarkable, no AVN (1/21). Bilirubin and lysis labs worsening (1/21), CRP  elevated. MRI left shoulder and right tib/fib (1/23) concerning for osteomyelitis vs sequelae of sickle cell disease. HDS, afebrile since 1/21. Blood cultures from 1/21 NGTD. Ortho consulted, no acute interventions, recommended ID consult. ID consulted (1/23), okay to monitor off antibiotics and recommended a nuclear bone scan. If patient were to become HD unstable or febrile, re-culture and start broad spectrum antibiotics. Nuclear bone scan tentatively scheduled for 1/27 or 1/28. On 1/23, patient transitioned off dPCA and restarted on Q2hr pain medication. DC pending improvement in pain and osteomyelitis workup.       # Updates 1/25:   - Increased IV Dilaudid to 5mg Q2hrs  - Per nuclear med; ordered for a In111 WBC scan set up for Monday, blood  at 8am. Then they will reinject the blood sometime around noon and will scan him on Tues 1/28. No prep is needed/ does not need to be NPO   - trend CRP per Dr. Cruz      # C/f osteomyelitis   # Isolated fever   - febrile 1/20, max temp 38.1  - blood cultures 1/21 NGTD   - UA/urine cult negative 1/21  - 1/21 Xray right leg and left shoulder unremarkable, no AVN, may need MRI of leg pain persists    - sed rate negative (1/21)  - CRP 13.31 (1/21) --> 12.66 (1/22) --> 12.62 (1/23) --> 12.77 (1/24); trend daily per Dr. Cruz   - 1/22 reporting improvement in pain but will order MRI left shoulder and MRI right tib/fib to rule out osteonecrosis vs osteomyelitis given worsening lysis labs and elevated CRP  - Right ankle chronic wound, small tear from scratching; no signs of infection, swelling or drainage; CTM  - MR right tib/fib (1/23) signal intensity seen in the tibia. difficult to delineate whether or not the changes are related to sickle cell disease and/or osteomyelitis and it is possible that both processes are present. A dual isotope nuclear medicine study may be helpful for further assessment. Edema in the pretibial soft tissues and surrounding muscles favored to be  reactive with infectious myositis not entirely excluded.  - MR left shoulder (1/23) signal abnormality of the intramedullary cavity of the humeral diaphysis with associated periosteal reaction. This could be related to patient's known sickle cell disease although osteomyelitis is another diagnostic consideration. Both could be present. MRI of the humerus is recommended to assess the entirety of the process.  - MR left humerus (1/23): Diffuse heterogeneity of the humeral diaphysis extending to the humeral condyles with bone marrow edema and associated periosteal edema. findings could be related to the patient's known sickle cell crisis and represent osteonecrosis. Superimposed osteomyelitis remains in the differential. Dual isotope nuclear medicine study could be helpful for further assessment given that this process is multifocal and involves the tibia as well. Edema in the perihumeral soft tissues and surrounding muscles could be reactive however infectious myositis is not entirely excluded. Edema about the radial nerve and adjacent vasculature. There is questionable abscess of flow void of adjacent venous structures which may reflect thrombophlebitis. Consider Doppler ultrasound of the left upper extremity.  - 1/23 CK negative low concern for myositis  - Vasc US duplex UE (1/24) negative for DVT   - Ortho consulted (1/23) No acute orthopaedic surgical intervention, Recommend ID consult for recommendations, If c/f osteo per ID, recommend IV abx, Ortho on standby for bony debridement, WBAT RLE and LUE  - ID consulted (1/23) okay to monitor off antibiotics, follow blood cultures from 1/21 (NGTD), pursue nuclear imaging to further assess osteomyelitis   - Per nuclear medicine; ordered for a In111 WBC scan set up for Monday, blood  at 8am. Then they will reinject the blood sometime around noon and will scan him on Tues 1/28. No prep is needed/ does not need to be NPO   - NM bone marrow whole body pending (to be  done Tuesday 1/28) per nuclear medicine attending, Dr. Skelton   - NM Inflammation whole body with indium 111 ordered per nuclear medicine attending, Dr. Skelton   - If patient becomes HD unstable or febrile; re-culture and cover broad spectrum      # Hgb SS with Pain  - OARRS reviewed, no aberrancies  - No current care path  - Hgb baseline ~8.5; Hgb 10.7 (1/19); no indication for transfusion  - Tbili baseline fluctuates ~5 (planning for cholecystectomy 1/30); Tbili 7.2 (1/19), T.bili elevated to 12.8, direct 1.9   - LDH baseline ~500;  (1/19) --> 450 (1/22)   - s/p 1L IV LR in ED 1/19, plan IV hydration with D51/2 X 12 hrs   - s/p IV dilaudid 3mg q2hrs PRN severe pain, increased to IV Dilaudid 4mg q2 hrs PRN (1/20-1/24), now increased to Dilaudid 5mg q2 hrs PRN  - s/p dPCA for uncontrolled pain (1/21-1/22)  - 1/23 Transitioned off PCA and restarted 4mg IVP dilaudid Q2hrs   - Started IV Toradol 15 mg q6 X 3 days with PPI (1/19)   - Started Lidocaine patch   - Continue home folic acid 1mg daily  - Pre exchange labs sent 1/19 d/t reported severity of pain  - Hgb S (1/19): 58.6%   - Utox (1/19): positive for cannabinoid   - PO Zofran PRN for opioid-induced nausea and PO Benadryl PRN for opioid-induced pruritus  - Bowel regimen for opioid-induced constipation with DocuSenna 2tabs BID and Miralax daily; LBM 1/23   - Monitor need for exchange if labs and pain worsens, exchange labs sent 1/21      # Hx of Priapism  - Recent admission c/b worsening priapism needing urgent [facility] (9/19)  - Denies recurrent issues with priapism outpt recently  - Continue Sudafed 60mg q8h PRN for priapism      # Recent Choledocholithiasis  # Plan for Cholecystectomy 1/31/25  - s/p ERCP 7/18/24 with a biliary sphincterotomy where sludge and stones were removed, achieving complete clearance  - Seen by gen surg 8/8/24 Dr. Dove who rec lap cholecystectomy d/t the chance of recurrence of choledocholithiasis  - Tbili baseline fluctuates ~5;  Tbili 7.2 (1/19) --> 13.8 (1/22) --> 11.4 (1/24)   - direct bili 1.9 (1/21)   - No active abd pain on admit; low threshold for CT a/p or RUQ US if abdominal pain occurs  - Planning for cholecystectomy outpt 1/31/25     # Nodular lymphocyte predominant Hodgkins lymphoma (NLPHL)   - Primary Oncologist: Dr. Stoll  - Enlarged lymph nodes noted 4/1/22  - RUQ US (11/14/22) with mildly enlarged LNs in the region of the kavin hepatis  - MRI liver (11/16/22) showed re-demonstration of bulky retroperitoneal lymphadenopathy and kavin hepatic lymphadenopathy    - (11/18/22) lymph node biopsy showed atypical lymphoid infiltrate. Reviewed by Hemepath board, insufficient for lymphoma diagnosis  - PET/CT (12/6/22) showing retroperitoneal lymphadenopathy  - Followed up with Dr. Stoll (12/16/22) with plan for surg/onc consult for large tissue bx but patient missed apt and was lost to follow up  - Requested new apt with Dr. Ronnie Marte 6/19/23, patient was not seen and lost to follow up  - CT a/p (11/28/23) increased size of retroperitoneal lymph nodes reflecting extramedullary hematopoiesis I/s/o sickle cell vs lymphoma  - Paraaortic LN bx via IR (11/30/23) consistent with NLPHL. Flow: no clonal B cell or T cell population identified, lymphocyte 95%, CD3+CD4+ 68%, CD3+CD8+ 7%, CD19+ 24%  - Elevated LDH/bili partially from sickle cell disease   - Chemotherapy (R-CHOP) was discussed with primary oncologist Dr. Stoll, and decision was made to simplify his chemotherapy to Rituxan and prednisone q3 weeks mainly due to frequent sickle cell crisis  - Current chemo regimen: Rituxan and prednisone q3 weeks (C1 1/18/24, C2 2/8/24, Missed C3 d/t sickle cell pain crisis, C4 4/4/24, C5 5/16/24, C6 6/7/24)  - Per pt no longer taking Acyclovir 400mg BID prophy   - PET CT (7/25) overall showing great response to treatment with persistent residual viable disease involving a left common iliac node  - PET/CT (11/18) showing Interval increased  "metabolic activity within upper abdominal and  bilateral inguinal lymph nodes compatible with interval worsening of lymphomatous disease  - Last FUV with Dr. Stoll 11/21, d/w pt and mother regarding tx options vs observation. No current treatment at this time and will continue with observation and plan for repeat CT/PET in 3 months and follow up after that  - Next PET/CT 3/4, Dr. Stoll FUV 3/13     # Hx of nocturnal oxygen dependence and hypoxia on room air  - Pt currently has home 2L supp O2   - Wean as able, encourage incentive spirometry  - Pulm FUV 8/8- \"Given his history of sickle cell disease, it is important to ensure he has formal sleep testing to look at desaturations and presence of sleep apnea despite not having a convincing history/body habitus typical for suspecting sleep apnea- patients with sickle cell have a higher prevalence of sleep disorders including nocturnal hypoxemia\"--> rec sleep referral for formal testing if deemed appropriate, ABG and O2 destat testing, and TTE with bubble study  - TTE 8/23 showing EF 60-65%, severely dilated LV and LA, + intrapulmonary shunting  - Has Pulm FUV 2/13  - Pt currently on 2L supp O2  - Encourage IS     # DVT prophy:   - Lovenox subcutaneous, SCDs, encourage ambulation     # DISPO:  - Full code, confirmed on admit  - DC pending improvement in pain and osteo workup   - SUSIE Narayan (Parent) [phone]  - Cardiology FUV 1/28, Cholecystectomy 1/31, Pulm EPV 2/13, PET/CT 3/4, Dr. Stoll FUV 3/13, Sickle Cell FUV 4/9      I spent 60 minutes in the professional and overall care of this patient.    Assessment and plan as above discussed with attending physician Dr. Bren Reynolds, PAKenzieC  "

## 2025-01-26 VITALS
WEIGHT: 160 LBS | DIASTOLIC BLOOD PRESSURE: 55 MMHG | HEIGHT: 74 IN | SYSTOLIC BLOOD PRESSURE: 114 MMHG | BODY MASS INDEX: 20.53 KG/M2 | HEART RATE: 79 BPM | TEMPERATURE: 98.6 F | RESPIRATION RATE: 16 BRPM | OXYGEN SATURATION: 93 %

## 2025-01-26 LAB
ALBUMIN SERPL BCP-MCNC: 4.7 G/DL (ref 3.4–5)
ALP SERPL-CCNC: 202 U/L (ref 33–120)
ALT SERPL W P-5'-P-CCNC: 31 U/L (ref 10–52)
ANION GAP SERPL CALC-SCNC: 14 MMOL/L (ref 10–20)
AST SERPL W P-5'-P-CCNC: 43 U/L (ref 9–39)
BASOPHILS # BLD MANUAL: 0.2 X10*3/UL (ref 0–0.1)
BASOPHILS NFR BLD MANUAL: 1.8 %
BILIRUB SERPL-MCNC: 9.3 MG/DL (ref 0–1.2)
BUN SERPL-MCNC: 7 MG/DL (ref 6–23)
CALCIUM SERPL-MCNC: 10 MG/DL (ref 8.6–10.6)
CHLORIDE SERPL-SCNC: 99 MMOL/L (ref 98–107)
CO2 SERPL-SCNC: 27 MMOL/L (ref 21–32)
CREAT SERPL-MCNC: 0.52 MG/DL (ref 0.5–1.3)
CRP SERPL-MCNC: 7.41 MG/DL
EGFRCR SERPLBLD CKD-EPI 2021: >90 ML/MIN/1.73M*2
EOSINOPHIL # BLD MANUAL: 0.29 X10*3/UL (ref 0–0.7)
EOSINOPHIL NFR BLD MANUAL: 2.6 %
ERYTHROCYTE [DISTWIDTH] IN BLOOD BY AUTOMATED COUNT: 22.3 % (ref 11.5–14.5)
GLUCOSE SERPL-MCNC: 83 MG/DL (ref 74–99)
HCT VFR BLD AUTO: 25.7 % (ref 41–52)
HGB BLD-MCNC: 8.9 G/DL (ref 13.5–17.5)
HGB RETIC QN: 30 PG (ref 28–38)
HYPOCHROMIA BLD QL SMEAR: ABNORMAL
IMM GRANULOCYTES # BLD AUTO: 0.1 X10*3/UL (ref 0–0.7)
IMM GRANULOCYTES NFR BLD AUTO: 0.9 % (ref 0–0.9)
IMMATURE RETIC FRACTION: 28.7 %
LDH SERPL L TO P-CCNC: 450 U/L (ref 84–246)
LYMPHOCYTES # BLD MANUAL: 1.97 X10*3/UL (ref 1.2–4.8)
LYMPHOCYTES NFR BLD MANUAL: 17.6 %
MCH RBC QN AUTO: 28.7 PG (ref 26–34)
MCHC RBC AUTO-ENTMCNC: 34.6 G/DL (ref 32–36)
MCV RBC AUTO: 83 FL (ref 80–100)
MONOCYTES # BLD MANUAL: 1.18 X10*3/UL (ref 0.1–1)
MONOCYTES NFR BLD MANUAL: 10.5 %
MYELOCYTES # BLD MANUAL: 0.1 X10*3/UL
MYELOCYTES NFR BLD MANUAL: 0.9 %
NEUTROPHILS # BLD MANUAL: 7.16 X10*3/UL (ref 1.2–7.7)
NEUTS BAND # BLD MANUAL: 0.68 X10*3/UL (ref 0–0.7)
NEUTS BAND NFR BLD MANUAL: 6.1 %
NEUTS SEG # BLD MANUAL: 6.48 X10*3/UL (ref 1.2–7)
NEUTS SEG NFR BLD MANUAL: 57.9 %
NRBC BLD-RTO: 1.8 /100 WBCS (ref 0–0)
PLASMA CELLS # BLD MANUAL: 0.29 X10*3/UL
PLASMA CELLS NFR BLD MANUAL: 2.6 %
PLATELET # BLD AUTO: 275 X10*3/UL (ref 150–450)
POLYCHROMASIA BLD QL SMEAR: ABNORMAL
POTASSIUM SERPL-SCNC: 4.1 MMOL/L (ref 3.5–5.3)
PROT SERPL-MCNC: 7.5 G/DL (ref 6.4–8.2)
RBC # BLD AUTO: 3.1 X10*6/UL (ref 4.5–5.9)
RBC MORPH BLD: ABNORMAL
RETICS #: 0.58 X10*6/UL (ref 0.02–0.12)
RETICS/RBC NFR AUTO: 18.8 % (ref 0.5–2)
SICKLE CELLS BLD QL SMEAR: ABNORMAL
SODIUM SERPL-SCNC: 136 MMOL/L (ref 136–145)
TOTAL CELLS COUNTED BLD: 114
WBC # BLD AUTO: 11.2 X10*3/UL (ref 4.4–11.3)

## 2025-01-26 PROCEDURE — 2500000001 HC RX 250 WO HCPCS SELF ADMINISTERED DRUGS (ALT 637 FOR MEDICARE OP): Performed by: PHYSICIAN ASSISTANT

## 2025-01-26 PROCEDURE — 86140 C-REACTIVE PROTEIN: CPT

## 2025-01-26 PROCEDURE — 2500000004 HC RX 250 GENERAL PHARMACY W/ HCPCS (ALT 636 FOR OP/ED): Performed by: PHYSICIAN ASSISTANT

## 2025-01-26 PROCEDURE — 99232 SBSQ HOSP IP/OBS MODERATE 35: CPT | Performed by: INTERNAL MEDICINE

## 2025-01-26 PROCEDURE — 2500000005 HC RX 250 GENERAL PHARMACY W/O HCPCS: Performed by: NURSE PRACTITIONER

## 2025-01-26 PROCEDURE — 85045 AUTOMATED RETICULOCYTE COUNT: CPT | Performed by: NURSE PRACTITIONER

## 2025-01-26 PROCEDURE — 2500000005 HC RX 250 GENERAL PHARMACY W/O HCPCS

## 2025-01-26 PROCEDURE — 1170000001 HC PRIVATE ONCOLOGY ROOM DAILY

## 2025-01-26 PROCEDURE — 99233 SBSQ HOSP IP/OBS HIGH 50: CPT | Performed by: PHYSICIAN ASSISTANT

## 2025-01-26 PROCEDURE — 2500000001 HC RX 250 WO HCPCS SELF ADMINISTERED DRUGS (ALT 637 FOR MEDICARE OP): Performed by: NURSE PRACTITIONER

## 2025-01-26 PROCEDURE — 85027 COMPLETE CBC AUTOMATED: CPT | Performed by: NURSE PRACTITIONER

## 2025-01-26 PROCEDURE — 85007 BL SMEAR W/DIFF WBC COUNT: CPT | Performed by: NURSE PRACTITIONER

## 2025-01-26 PROCEDURE — 80053 COMPREHEN METABOLIC PANEL: CPT | Performed by: NURSE PRACTITIONER

## 2025-01-26 PROCEDURE — 83615 LACTATE (LD) (LDH) ENZYME: CPT | Performed by: NURSE PRACTITIONER

## 2025-01-26 PROCEDURE — 36415 COLL VENOUS BLD VENIPUNCTURE: CPT | Performed by: NURSE PRACTITIONER

## 2025-01-26 RX ADMIN — HYDROMORPHONE HYDROCHLORIDE 5 MG: 2 INJECTION, SOLUTION INTRAMUSCULAR; INTRAVENOUS; SUBCUTANEOUS at 00:02

## 2025-01-26 RX ADMIN — HYDROMORPHONE HYDROCHLORIDE 5 MG: 2 INJECTION, SOLUTION INTRAMUSCULAR; INTRAVENOUS; SUBCUTANEOUS at 20:46

## 2025-01-26 RX ADMIN — PANTOPRAZOLE SODIUM 40 MG: 40 TABLET, DELAYED RELEASE ORAL at 06:08

## 2025-01-26 RX ADMIN — LIDOCAINE 2 PATCH: 560 PATCH PERCUTANEOUS; TOPICAL; TRANSDERMAL at 10:02

## 2025-01-26 RX ADMIN — HYDROMORPHONE HYDROCHLORIDE 5 MG: 2 INJECTION, SOLUTION INTRAMUSCULAR; INTRAVENOUS; SUBCUTANEOUS at 02:01

## 2025-01-26 RX ADMIN — HYDROMORPHONE HYDROCHLORIDE 5 MG: 2 INJECTION, SOLUTION INTRAMUSCULAR; INTRAVENOUS; SUBCUTANEOUS at 04:05

## 2025-01-26 RX ADMIN — LIDOCAINE 1 PATCH: 560 PATCH PERCUTANEOUS; TOPICAL; TRANSDERMAL at 10:00

## 2025-01-26 RX ADMIN — HYDROMORPHONE HYDROCHLORIDE 5 MG: 2 INJECTION, SOLUTION INTRAMUSCULAR; INTRAVENOUS; SUBCUTANEOUS at 18:40

## 2025-01-26 RX ADMIN — Medication 2 L/MIN: at 10:02

## 2025-01-26 RX ADMIN — HYDROMORPHONE HYDROCHLORIDE 5 MG: 2 INJECTION, SOLUTION INTRAMUSCULAR; INTRAVENOUS; SUBCUTANEOUS at 16:28

## 2025-01-26 RX ADMIN — HYDROMORPHONE HYDROCHLORIDE 5 MG: 2 INJECTION, SOLUTION INTRAMUSCULAR; INTRAVENOUS; SUBCUTANEOUS at 22:56

## 2025-01-26 RX ADMIN — FOLIC ACID 1 MG: 1 TABLET ORAL at 10:00

## 2025-01-26 RX ADMIN — HYDROMORPHONE HYDROCHLORIDE 5 MG: 2 INJECTION, SOLUTION INTRAMUSCULAR; INTRAVENOUS; SUBCUTANEOUS at 08:16

## 2025-01-26 RX ADMIN — Medication 21 PERCENT: at 20:47

## 2025-01-26 RX ADMIN — HYDROCORTISONE: 1 CREAM TOPICAL at 10:02

## 2025-01-26 RX ADMIN — MELATONIN 3 MG: 3 TAB ORAL at 00:54

## 2025-01-26 RX ADMIN — HYDROCORTISONE: 1 CREAM TOPICAL at 20:46

## 2025-01-26 RX ADMIN — HYDROMORPHONE HYDROCHLORIDE 5 MG: 2 INJECTION, SOLUTION INTRAMUSCULAR; INTRAVENOUS; SUBCUTANEOUS at 10:58

## 2025-01-26 RX ADMIN — HYDROMORPHONE HYDROCHLORIDE 5 MG: 2 INJECTION, SOLUTION INTRAMUSCULAR; INTRAVENOUS; SUBCUTANEOUS at 13:21

## 2025-01-26 RX ADMIN — HYDROMORPHONE HYDROCHLORIDE 5 MG: 2 INJECTION, SOLUTION INTRAMUSCULAR; INTRAVENOUS; SUBCUTANEOUS at 06:08

## 2025-01-26 ASSESSMENT — PAIN SCALES - GENERAL
PAINLEVEL_OUTOF10: 6
PAINLEVEL_OUTOF10: 9
PAINLEVEL_OUTOF10: 5 - MODERATE PAIN
PAINLEVEL_OUTOF10: 8
PAINLEVEL_OUTOF10: 10 - WORST POSSIBLE PAIN
PAINLEVEL_OUTOF10: 5 - MODERATE PAIN
PAINLEVEL_OUTOF10: 9
PAINLEVEL_OUTOF10: 9
PAINLEVEL_OUTOF10: 10 - WORST POSSIBLE PAIN
PAINLEVEL_OUTOF10: 8
PAINLEVEL_OUTOF10: 5 - MODERATE PAIN
PAINLEVEL_OUTOF10: 10 - WORST POSSIBLE PAIN
PAINLEVEL_OUTOF10: 9
PAINLEVEL_OUTOF10: 10 - WORST POSSIBLE PAIN
PAINLEVEL_OUTOF10: 8
PAINLEVEL_OUTOF10: 10 - WORST POSSIBLE PAIN
PAINLEVEL_OUTOF10: 5 - MODERATE PAIN
PAINLEVEL_OUTOF10: 5 - MODERATE PAIN
PAINLEVEL_OUTOF10: 8

## 2025-01-26 ASSESSMENT — COGNITIVE AND FUNCTIONAL STATUS - GENERAL
MOBILITY SCORE: 24
DAILY ACTIVITIY SCORE: 24

## 2025-01-26 ASSESSMENT — PAIN - FUNCTIONAL ASSESSMENT
PAIN_FUNCTIONAL_ASSESSMENT: 0-10

## 2025-01-26 ASSESSMENT — ENCOUNTER SYMPTOMS
EYES NEGATIVE: 1
RESPIRATORY NEGATIVE: 1
MYALGIAS: 1
CONSTITUTIONAL NEGATIVE: 1
GASTROINTESTINAL NEGATIVE: 1
NEUROLOGICAL NEGATIVE: 1
ARTHRALGIAS: 1

## 2025-01-26 ASSESSMENT — PAIN SCALES - WONG BAKER
WONGBAKER_NUMERICALRESPONSE: HURTS LITTLE MORE
WONGBAKER_NUMERICALRESPONSE: HURTS LITTLE MORE
WONGBAKER_NUMERICALRESPONSE: HURTS WHOLE LOT

## 2025-01-26 ASSESSMENT — PAIN DESCRIPTION - ORIENTATION: ORIENTATION: LEFT

## 2025-01-26 ASSESSMENT — PAIN DESCRIPTION - LOCATION
LOCATION: GENERALIZED
LOCATION: ARM
LOCATION: GENERALIZED

## 2025-01-26 NOTE — CARE PLAN
The patient's goals for the shift include Rest and Comfort    The clinical goals for the shift include pt will have better managed pain during my shift    Over the shift, the patient did not make progress toward the following goals. Barriers to progression include . Recommendations to address these barriers include .

## 2025-01-26 NOTE — PROGRESS NOTES
Joellen Narayan is a 24 y.o. male on day 7 of admission presenting with Sickle cell pain crisis (Multi).    Subjective   Interval History: Pt reports pain in his L shoulder and upper arm as well as R leg.  He says the pain started the day he came in and has slowly been getting better with treatment for sickle crisis. He notes that this pain is typical for his SCC, but the distribution tends to vary        Review of Systems   Constitutional: Negative.    HENT: Negative.     Eyes: Negative.    Respiratory: Negative.     Gastrointestinal: Negative.    Musculoskeletal:  Positive for arthralgias and myalgias.   Neurological: Negative.        Objective   Range of Vitals (last 24 hours)  Heart Rate:  [81-94]   Temp:  [36.3 °C (97.3 °F)-36.8 °C (98.2 °F)]   Resp:  [16-20]   BP: (121-149)/(67-79)   SpO2:  [91 %-97 %]   Daily Weight  01/19/25 : 72.6 kg (160 lb)    Body mass index is 20.54 kg/m².    Gen: sitting up, NAD  Pulm: CTA b/l  CV: RRR  Abd: soft, nt  Ext: no edema, L shoulder without effusion and FROM, no erythema/iduration  R knee without effusion and FROM, no erythema/iduration    Antibiotics  chlorhexidine - 4 %    Relevant Results  Labs  Results from last 72 hours   Lab Units 01/26/25  0819 01/25/25  0710 01/24/25  0851   WBC AUTO x10*3/uL 11.2 11.8* 12.0*   HEMOGLOBIN g/dL 8.9* 9.0* 9.9*   HEMATOCRIT % 25.7* 25.8* 28.3*   PLATELETS AUTO x10*3/uL 275 240 228   NEUTROS PCT AUTO %  --  59.2 65.7   LYMPHO PCT MAN % 17.6  --   --    LYMPHS PCT AUTO %  --  21.3 17.0   MONO PCT MAN % 10.5  --   --    MONOS PCT AUTO %  --  12.3 12.2   EOSINO PCT MAN % 2.6  --   --    EOS PCT AUTO %  --  5.4 4.2     Results from last 72 hours   Lab Units 01/26/25  0819 01/25/25  0710 01/24/25  0851   SODIUM mmol/L 136 136 134*   POTASSIUM mmol/L 4.1 4.3 4.3   CHLORIDE mmol/L 99 100 98   CO2 mmol/L 27 27 26   BUN mg/dL 7 8 6   CREATININE mg/dL 0.52 0.60 0.52   GLUCOSE mg/dL 83 82 84   CALCIUM mg/dL 10.0 10.1 10.3   ANION GAP mmol/L 14 13  14   EGFR mL/min/1.73m*2 >90 >90 >90     Results from last 72 hours   Lab Units 01/26/25  0819 01/25/25  0710 01/24/25  0851   ALK PHOS U/L 202* 214* 218*   BILIRUBIN TOTAL mg/dL 9.3* 9.4* 11.4*   PROTEIN TOTAL g/dL 7.5 7.8 8.1   ALT U/L 31 37 37   AST U/L 43* 52* 57*   ALBUMIN g/dL 4.7 4.7 5.0     Estimated Creatinine Clearance: 125 mL/min (by C-G formula based on SCr of 0.52 mg/dL).  C-Reactive Protein   Date Value Ref Range Status   01/26/2025 7.41 (H) <1.00 mg/dL Final   01/25/2025 9.85 (H) <1.00 mg/dL Final   01/24/2025 12.77 (H) <1.00 mg/dL Final     Microbiology    Blood culture:   1/21/25: NG    Imaging  MRI humerus  Diffuse heterogeneity of the intramedullary cavity of the humeral  diaphysis extending to the humeral condyles with bone marrow edema  and associated periosteal edema. Evaluation is limited secondary to  the lack of contrast; however, findings could be related to the  patient's known sickle cell crisis and represent osteonecrosis.  Superimposed osteomyelitis remains in the differential. Dual isotope  nuclear medicine study could be helpful for further assessment given  that this process is multifocal and involves the tibia as well.    MRI tibia  IMPRESSION:  1. Extensive abnormal signal intensity seen in the tibia and to a  lesser extent in the fibula. The limitations of this examination  including the extensive nature of the findings makes it difficult to  delineate whether or not the changes are related to sickle cell  disease and/or osteomyelitis and it is possible that both processes  are present. It is possible that the signal changes in the proximal  metadiaphysis of the tibia are most suspicious for osteomyelitis. A  dual isotope nuclear medicine study may be helpful for further  assessment.          Assessment/Plan   24 y.o. man with PMH sickle cell disease, Hodgkins lymphoma who presented with sickle cell crisis. Pt says that pain is typical for his crisis and notes that it can occur in  various locations. He did not have fevers and here his highest temp was 38.1. On exam he does not have evidence for septic arthritis. He had MRIs of the regions of current pain and they show bone changes that could be c/w SSD, but also osteomyelitis. I suspect that this is more likely changes due to SSD. CRP can be elevated in SSD especially during crisis.    Recommendations:  - I will review the MRIs with [facility] radiology tomorrow (they are not in over the weekend)  - I think a tagged scan is reasonable  - would not give empiric antibiotics  - send repeat blood cultures if febrile     I spent 45 minutes in the professional and overall care of this patient.      Jb Colby MD

## 2025-01-26 NOTE — PROGRESS NOTES
"Joellen Narayan is a 24 y.o. male on day 7 of admission presenting with Sickle cell pain crisis (Multi).    Subjective   Seen this morning at bedside, pain is much better, was able to walk around yesterday, went down to lobby with his mom.  Right leg pain has significantly improved, left arm pain has also improved after pain meds were increased yesterday. Discussed MR results and US UE negative for clot. Also discussed plans for tagged WBC scan and bone scan next week. Urinating and drinking okay. Denies chest pain, SOB, N/V, abd pain, abd pain.     Objective     Physical Exam  Constitutional:       Appearance: Normal appearance.   HENT:      Head: Normocephalic and atraumatic.   Eyes:      Extraocular Movements: Extraocular movements intact.   Cardiovascular:      Rate and Rhythm: Normal rate and regular rhythm.   Pulmonary:      Effort: Pulmonary effort is normal.      Breath sounds: Normal breath sounds.   Abdominal:      General: Abdomen is flat.      Palpations: Abdomen is soft.      Tenderness: There is no abdominal tenderness.   Musculoskeletal:         General: Normal range of motion.      Comments: Tenderness to right shin and left arm   Skin:     General: Skin is warm and dry.   Neurological:      General: No focal deficit present.      Mental Status: He is alert and oriented to person, place, and time.   Psychiatric:         Mood and Affect: Mood normal.         Behavior: Behavior normal.       Last Recorded Vitals  Blood pressure 146/75, pulse 82, temperature 36.4 °C (97.5 °F), temperature source Temporal, resp. rate 18, height 1.88 m (6' 2\"), weight 72.6 kg (160 lb), SpO2 94%.  Intake/Output last 3 Shifts:  I/O last 3 completed shifts:  In: - (0 mL/kg)   Out: 2125 (29.3 mL/kg) [Urine:2125 (0.8 mL/kg/hr)]  Weight: 72.6 kg     Relevant Results  XR chest 2 views  Result Date: 1/19/2025  Cardiomegaly but no evidence of other acute intrathoracic abnormality.   Signed by: Wyatt Hicks 1/19/2025 8:57 AM " Dictation workstation:   [id number]    US right upper quadrant  Result Date: 12/28/2024  Cholelithiasis without evidence of acute cholecystitis.   Hepatomegaly with hepatic steatosis.   I personally reviewed the images/study and I agree with the findings as stated by resident physician Dr. José Miguel Flowers . This study was interpreted at [facility], [place], [place]..   MACRO: None   Signed by: Jamshid Enrique 12/28/2024 7:43 AM Dictation workstation:   [id number]    Scheduled medications  enoxaparin, 40 mg, subcutaneous, q24h  folic acid, 1 mg, oral, Daily  hydrocortisone, , Topical, BID  lidocaine, 1 patch, transdermal, Daily  lidocaine, 2 patch, transdermal, Daily  oxygen, , inhalation, Continuous - Inhalation  pantoprazole, 40 mg, oral, Daily before breakfast  polyethylene glycol, 17 g, oral, Daily  sennosides-docusate sodium, 2 tablet, oral, BID      Continuous medications  PRN medications  PRN medications: diphenhydrAMINE, HYDROmorphone, melatonin, naloxone, ondansetron, pseudoephedrine     Assessment/Plan   Assessment & Plan  Hodgkin's paragranuloma (Multi)    Sickle cell anemia with pain (Multi)  Joellen Narayan is a 24 y.o. 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 Hgb and lysis labs at baseline. CXR (1/19) without acute process. Admitted for further management on pain. Started on IV dilaudid per care plan and transitioned to dPCA pump on 1/21 for further pain control. Xray right leg and left shoulder unremarkable, no AVN (1/21). Bilirubin and lysis labs  worsening (1/21), CRP elevated. MRI left shoulder and right tib/fib (1/23) concerning for osteomyelitis vs sequelae of sickle cell disease. HDS, afebrile since 1/21. Blood cultures from 1/21 NGTD. Ortho consulted, no acute interventions, recommended ID consult. ID consulted (1/23), okay to monitor off antibiotics and recommended a nuclear bone scan. If patient were to become HD unstable or febrile, re-culture and start broad spectrum antibiotics. Nuclear bone scan tentatively scheduled for 1/27 or 1/28. On 1/23, patient transitioned off dPCA and restarted on Q2hr pain medication. DC pending improvement in pain and osteomyelitis workup.       # Updates 1/26:   - Cont IV Dilaudid to 5mg Q2hrs for today and re-eval tomorrow   - Per nuclear med; ordered for a In111 WBC scan set up for Monday, blood  at 8am. Then they will reinject the blood sometime around noon and will scan him on Tues 1/28. No prep is needed/ does not need to be NPO   - Trend CRP per Dr. Cruz      # C/f osteomyelitis   # Isolated fever   - febrile 1/20, max temp 38.1  - blood cultures 1/21 NGTD   - UA/urine cult negative 1/21  - 1/21 Xray right leg and left shoulder unremarkable, no AVN, may need MRI of leg pain persists    - sed rate negative (1/21)  - CRP 13.31 (1/21) --> 12.66 (1/22) --> 12.62 (1/23) --> 12.77 (1/24); trend daily per Dr. Cruz   - 1/22 reporting improvement in pain but will order MRI left shoulder and MRI right tib/fib to rule out osteonecrosis vs osteomyelitis given worsening lysis labs and elevated CRP  - Right ankle chronic wound, small tear from scratching; no signs of infection, swelling or drainage; CTM  - MR right tib/fib (1/23) signal intensity seen in the tibia. difficult to delineate whether or not the changes are related to sickle cell disease and/or osteomyelitis and it is possible that both processes are present. A dual isotope nuclear medicine study may be helpful for further assessment. Edema in the pretibial  soft tissues and surrounding muscles favored to be reactive with infectious myositis not entirely excluded.  - MR left shoulder (1/23) signal abnormality of the intramedullary cavity of the humeral diaphysis with associated periosteal reaction. This could be related to patient's known sickle cell disease although osteomyelitis is another diagnostic consideration. Both could be present. MRI of the humerus is recommended to assess the entirety of the process.  - MR left humerus (1/23): Diffuse heterogeneity of the humeral diaphysis extending to the humeral condyles with bone marrow edema and associated periosteal edema. findings could be related to the patient's known sickle cell crisis and represent osteonecrosis. Superimposed osteomyelitis remains in the differential. Dual isotope nuclear medicine study could be helpful for further assessment given that this process is multifocal and involves the tibia as well. Edema in the perihumeral soft tissues and surrounding muscles could be reactive however infectious myositis is not entirely excluded. Edema about the radial nerve and adjacent vasculature. There is questionable abscess of flow void of adjacent venous structures which may reflect thrombophlebitis. Consider Doppler ultrasound of the left upper extremity.  - 1/23 CK negative low concern for myositis  - Vasc US duplex UE (1/24) negative for DVT   - Ortho consulted (1/23) No acute orthopaedic surgical intervention, Recommend ID consult for recommendations, If c/f osteo per ID, recommend IV abx, Ortho on standby for bony debridement, WBAT RLE and LUE  - ID consulted (1/23) okay to monitor off antibiotics, follow blood cultures from 1/21 (NGTD), pursue nuclear imaging to further assess osteomyelitis   - Per nuclear medicine; ordered for a In111 WBC scan set up for Monday, blood  at 8am. Then they will reinject the blood sometime around noon and will scan him on Tues 1/28. No prep is needed/ does not need to  be NPO   - NM bone marrow whole body pending (to be done Tuesday 1/28) per nuclear medicine attending, Dr. Skelton   - NM Inflammation whole body with indium 111 ordered per nuclear medicine attending, Dr. Skelton   - If patient becomes HD unstable or febrile; re-culture and cover broad spectrum      # Hgb SS with Pain  - OARRS reviewed, no aberrancies  - No current care path  - Hgb baseline ~8.5; Hgb 10.7 (1/19); no indication for transfusion  - Tbili baseline fluctuates ~5 (planning for cholecystectomy 1/30); Tbili 7.2 (1/19), T.bili elevated to 12.8, direct 1.9   - LDH baseline ~500;  (1/19) --> 450 (1/22)   - s/p 1L IV LR in ED 1/19, plan IV hydration with D51/2 X 12 hrs   - s/p IV dilaudid 3mg q2hrs PRN severe pain, increased to IV Dilaudid 4mg q2 hrs PRN (1/20-1/24), now increased to Dilaudid 5mg q2 hrs PRN  - s/p dPCA for uncontrolled pain (1/21-1/22)  - 1/23 Transitioned off PCA and restarted 4mg IVP dilaudid Q2hrs   - Started IV Toradol 15 mg q6 X 3 days with PPI (1/19)   - Started Lidocaine patch   - Continue home folic acid 1mg daily  - Pre exchange labs sent 1/19 d/t reported severity of pain  - Hgb S (1/19): 58.6%   - Utox (1/19): positive for cannabinoid   - PO Zofran PRN for opioid-induced nausea and PO Benadryl PRN for opioid-induced pruritus  - Bowel regimen for opioid-induced constipation with DocuSenna 2tabs BID and Miralax daily; LBM 1/23   - Monitor need for exchange if labs and pain worsens, exchange labs sent 1/21      # Hx of Priapism  - Recent admission c/b worsening priapism needing urgent [facility] (9/19)  - Denies recurrent issues with priapism outpt recently  - Continue Sudafed 60mg q8h PRN for priapism      # Recent Choledocholithiasis  # Plan for Cholecystectomy 1/31/25  - s/p ERCP 7/18/24 with a biliary sphincterotomy where sludge and stones were removed, achieving complete clearance  - Seen by gen surg 8/8/24 Dr. Dove who rec lap cholecystectomy d/t the chance of recurrence of  choledocholithiasis  - Tbili baseline fluctuates ~5; Tbili 7.2 (1/19) --> 13.8 (1/22) --> 11.4 (1/24)   - direct bili 1.9 (1/21)   - No active abd pain on admit; low threshold for CT a/p or RUQ US if abdominal pain occurs  - Planning for cholecystectomy outpt 1/31/25     # Nodular lymphocyte predominant Hodgkins lymphoma (NLPHL)   - Primary Oncologist: Dr. Stoll  - Enlarged lymph nodes noted 4/1/22  - RUQ US (11/14/22) with mildly enlarged LNs in the region of the kavin hepatis  - MRI liver (11/16/22) showed re-demonstration of bulky retroperitoneal lymphadenopathy and kavin hepatic lymphadenopathy    - (11/18/22) lymph node biopsy showed atypical lymphoid infiltrate. Reviewed by Hemepath board, insufficient for lymphoma diagnosis  - PET/CT (12/6/22) showing retroperitoneal lymphadenopathy  - Followed up with Dr. Stoll (12/16/22) with plan for surg/onc consult for large tissue bx but patient missed apt and was lost to follow up  - Requested new apt with Dr. Ronnie Marte 6/19/23, patient was not seen and lost to follow up  - CT a/p (11/28/23) increased size of retroperitoneal lymph nodes reflecting extramedullary hematopoiesis I/s/o sickle cell vs lymphoma  - Paraaortic LN bx via IR (11/30/23) consistent with NLPHL. Flow: no clonal B cell or T cell population identified, lymphocyte 95%, CD3+CD4+ 68%, CD3+CD8+ 7%, CD19+ 24%  - Elevated LDH/bili partially from sickle cell disease   - Chemotherapy (R-CHOP) was discussed with primary oncologist Dr. Stoll, and decision was made to simplify his chemotherapy to Rituxan and prednisone q3 weeks mainly due to frequent sickle cell crisis  - Current chemo regimen: Rituxan and prednisone q3 weeks (C1 1/18/24, C2 2/8/24, Missed C3 d/t sickle cell pain crisis, C4 4/4/24, C5 5/16/24, C6 6/7/24)  - Per pt no longer taking Acyclovir 400mg BID prophy   - PET CT (7/25) overall showing great response to treatment with persistent residual viable disease involving a left common iliac  "node  - PET/CT (11/18) showing Interval increased metabolic activity within upper abdominal and  bilateral inguinal lymph nodes compatible with interval worsening of lymphomatous disease  - Last FUV with Dr. Stoll 11/21, d/w pt and mother regarding tx options vs observation. No current treatment at this time and will continue with observation and plan for repeat CT/PET in 3 months and follow up after that  - Next PET/CT 3/4, Dr. Stoll FUV 3/13     # Hx of nocturnal oxygen dependence and hypoxia on room air  - Pt currently has home 2L supp O2   - Wean as able, encourage incentive spirometry  - Pulm FUV 8/8- \"Given his history of sickle cell disease, it is important to ensure he has formal sleep testing to look at desaturations and presence of sleep apnea despite not having a convincing history/body habitus typical for suspecting sleep apnea- patients with sickle cell have a higher prevalence of sleep disorders including nocturnal hypoxemia\"--> rec sleep referral for formal testing if deemed appropriate, ABG and O2 destat testing, and TTE with bubble study  - TTE 8/23 showing EF 60-65%, severely dilated LV and LA, + intrapulmonary shunting  - Has Pulm FUV 2/13  - Pt currently on 2L supp O2  - Encourage IS     # DVT prophy:   - Lovenox subcutaneous, SCDs, encourage ambulation     # DISPO:  - Full code, confirmed on admit  - DC pending improvement in pain and osteo workup   - SUSIE Narayan (Parent) [phone]  - Cardiology FUV 1/28, Cholecystectomy 1/31, Pulm EPV 2/13, PET/CT 3/4, Dr. Stoll FUV 3/13, Sickle Cell FUV 4/9      I spent 60 minutes in the professional and overall care of this patient.  Assessment and plan as above discussed with attending physician Dr. Bren Reynolds, PACOLETTE  "

## 2025-01-26 NOTE — NURSING NOTE
Pt pulled shower call light. Abimbola Lopez RN responded to call light to assist patient in bathroom and RN found patient sitting on floor. Pt stated he was feeling dizzy and lowered himself to the ground and then pulled shower light for assistance. Pt assisted back to bed. Pt had no visible injuries noted, vitals signs and orthostatics taken immediately post fall. Bedside RN Deja Schmitt notified provider Miranda Brambila PA-C of event and provider came to bedside to assess patient. Nursing supervisor and patient's mother were also notified of event. Bedside nurse, charge nurse, additional floor nurses, patient and nursing supervisor participated in post falls huddle.

## 2025-01-26 NOTE — CARE PLAN
The patient's goals for the shift include Rest and Comfort    No acute changed today, patient seems to be feeling better he is walking around with less pain   Problem: Pain  Goal: Free from opioid side effects throughout the shift  Outcome: Progressing       Problem: Pain - Adult  Goal: Verbalizes/displays adequate comfort level or baseline comfort level  Outcome: Progressing     Problem: Safety - Adult  Goal: Free from fall injury  Outcome: Progressing     Problem: Discharge Planning  Goal: Discharge to home or other facility with appropriate resources  Outcome: Progressing     Problem: Pain  Goal: Performs ADL's with improved pain control throughout shift  Outcome: Progressing

## 2025-01-26 NOTE — ASSESSMENT & PLAN NOTE
Joellen Narayan is a 24 y.o. 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 Hgb and lysis labs at baseline. CXR (1/19) without acute process. Admitted for further management on pain. Started on IV dilaudid per care plan and transitioned to dPCA pump on 1/21 for further pain control. Xray right leg and left shoulder unremarkable, no AVN (1/21). Bilirubin and lysis labs worsening (1/21), CRP elevated. MRI left shoulder and right tib/fib (1/23) concerning for osteomyelitis vs sequelae of sickle cell disease. HDS, afebrile since 1/21. Blood cultures from 1/21 NGTD. Ortho consulted, no acute interventions, recommended ID consult. ID consulted (1/23), okay to monitor off antibiotics and recommended a nuclear bone scan. If patient were to become HD unstable or febrile, re-culture and start broad spectrum antibiotics. Nuclear bone scan tentatively scheduled for 1/27 or 1/28. On 1/23, patient transitioned off dPCA and restarted on Q2hr pain medication. DC pending improvement in pain and osteomyelitis workup.       # Updates 1/26:   - Cont IV Dilaudid to 5mg Q2hrs for today and re-eval tomorrow   - Per nuclear med; ordered for a In111 WBC scan set up for Monday, blood  at 8am. Then they will reinject the blood sometime around noon and will scan him on Tues 1/28. No prep is needed/ does not need to be NPO   - Trend CRP per Dr. Cruz      # C/f osteomyelitis   # Isolated fever   - febrile 1/20, max temp 38.1  - blood cultures 1/21 NGTD   - UA/urine cult negative 1/21  - 1/21 Xray right leg and left shoulder unremarkable, no  AVN, may need MRI of leg pain persists    - sed rate negative (1/21)  - CRP 13.31 (1/21) --> 12.66 (1/22) --> 12.62 (1/23) --> 12.77 (1/24); trend daily per Dr. Cruz   - 1/22 reporting improvement in pain but will order MRI left shoulder and MRI right tib/fib to rule out osteonecrosis vs osteomyelitis given worsening lysis labs and elevated CRP  - Right ankle chronic wound, small tear from scratching; no signs of infection, swelling or drainage; CTM  - MR right tib/fib (1/23) signal intensity seen in the tibia. difficult to delineate whether or not the changes are related to sickle cell disease and/or osteomyelitis and it is possible that both processes are present. A dual isotope nuclear medicine study may be helpful for further assessment. Edema in the pretibial soft tissues and surrounding muscles favored to be reactive with infectious myositis not entirely excluded.  - MR left shoulder (1/23) signal abnormality of the intramedullary cavity of the humeral diaphysis with associated periosteal reaction. This could be related to patient's known sickle cell disease although osteomyelitis is another diagnostic consideration. Both could be present. MRI of the humerus is recommended to assess the entirety of the process.  - MR left humerus (1/23): Diffuse heterogeneity of the humeral diaphysis extending to the humeral condyles with bone marrow edema and associated periosteal edema. findings could be related to the patient's known sickle cell crisis and represent osteonecrosis. Superimposed osteomyelitis remains in the differential. Dual isotope nuclear medicine study could be helpful for further assessment given that this process is multifocal and involves the tibia as well. Edema in the perihumeral soft tissues and surrounding muscles could be reactive however infectious myositis is not entirely excluded. Edema about the radial nerve and adjacent vasculature. There is questionable abscess of flow void of adjacent  venous structures which may reflect thrombophlebitis. Consider Doppler ultrasound of the left upper extremity.  - 1/23 CK negative low concern for myositis  - Vasc US duplex UE (1/24) negative for DVT   - Ortho consulted (1/23) No acute orthopaedic surgical intervention, Recommend ID consult for recommendations, If c/f osteo per ID, recommend IV abx, Ortho on standby for bony debridement, WBAT RLE and LUE  - ID consulted (1/23) okay to monitor off antibiotics, follow blood cultures from 1/21 (NGTD), pursue nuclear imaging to further assess osteomyelitis   - Per nuclear medicine; ordered for a In111 WBC scan set up for Monday, blood  at 8am. Then they will reinject the blood sometime around noon and will scan him on Tues 1/28. No prep is needed/ does not need to be NPO   - NM bone marrow whole body pending (to be done Tuesday 1/28) per nuclear medicine attending, Dr. Skelton   - NM Inflammation whole body with indium 111 ordered per nuclear medicine attending, Dr. Skelton   - If patient becomes HD unstable or febrile; re-culture and cover broad spectrum      # Hgb SS with Pain  - OARRS reviewed, no aberrancies  - No current care path  - Hgb baseline ~8.5; Hgb 10.7 (1/19); no indication for transfusion  - Tbili baseline fluctuates ~5 (planning for cholecystectomy 1/30); Tbili 7.2 (1/19), T.bili elevated to 12.8, direct 1.9   - LDH baseline ~500;  (1/19) --> 450 (1/22)   - s/p 1L IV LR in ED 1/19, plan IV hydration with D51/2 X 12 hrs   - s/p IV dilaudid 3mg q2hrs PRN severe pain, increased to IV Dilaudid 4mg q2 hrs PRN (1/20-1/24), now increased to Dilaudid 5mg q2 hrs PRN  - s/p dPCA for uncontrolled pain (1/21-1/22)  - 1/23 Transitioned off PCA and restarted 4mg IVP dilaudid Q2hrs   - Started IV Toradol 15 mg q6 X 3 days with PPI (1/19)   - Started Lidocaine patch   - Continue home folic acid 1mg daily  - Pre exchange labs sent 1/19 d/t reported severity of pain  - Hgb S (1/19): 58.6%   - Utox (1/19):  positive for cannabinoid   - PO Zofran PRN for opioid-induced nausea and PO Benadryl PRN for opioid-induced pruritus  - Bowel regimen for opioid-induced constipation with DocuSenna 2tabs BID and Miralax daily; LBM 1/23   - Monitor need for exchange if labs and pain worsens, exchange labs sent 1/21      # Hx of Priapism  - Recent admission c/b worsening priapism needing urgent [facility] (9/19)  - Denies recurrent issues with priapism outpt recently  - Continue Sudafed 60mg q8h PRN for priapism      # Recent Choledocholithiasis  # Plan for Cholecystectomy 1/31/25  - s/p ERCP 7/18/24 with a biliary sphincterotomy where sludge and stones were removed, achieving complete clearance  - Seen by gen surg 8/8/24 Dr. Dove who rec lap cholecystectomy d/t the chance of recurrence of choledocholithiasis  - Tbili baseline fluctuates ~5; Tbili 7.2 (1/19) --> 13.8 (1/22) --> 11.4 (1/24)   - direct bili 1.9 (1/21)   - No active abd pain on admit; low threshold for CT a/p or RUQ US if abdominal pain occurs  - Planning for cholecystectomy outpt 1/31/25     # Nodular lymphocyte predominant Hodgkins lymphoma (NLPHL)   - Primary Oncologist: Dr. Stoll  - Enlarged lymph nodes noted 4/1/22  - RUQ US (11/14/22) with mildly enlarged LNs in the region of the kavin hepatis  - MRI liver (11/16/22) showed re-demonstration of bulky retroperitoneal lymphadenopathy and kavin hepatic lymphadenopathy    - (11/18/22) lymph node biopsy showed atypical lymphoid infiltrate. Reviewed by Hemepath board, insufficient for lymphoma diagnosis  - PET/CT (12/6/22) showing retroperitoneal lymphadenopathy  - Followed up with Dr. Stoll (12/16/22) with plan for surg/onc consult for large tissue bx but patient missed apt and was lost to follow up  - Requested new apt with Dr. Ronnie Marte 6/19/23, patient was not seen and lost to follow up  - CT a/p (11/28/23) increased size of retroperitoneal lymph nodes reflecting extramedullary hematopoiesis I/s/o sickle cell vs  "lymphoma  - Paraaortic LN bx via IR (11/30/23) consistent with NLPHL. Flow: no clonal B cell or T cell population identified, lymphocyte 95%, CD3+CD4+ 68%, CD3+CD8+ 7%, CD19+ 24%  - Elevated LDH/bili partially from sickle cell disease   - Chemotherapy (R-CHOP) was discussed with primary oncologist Dr. Stoll, and decision was made to simplify his chemotherapy to Rituxan and prednisone q3 weeks mainly due to frequent sickle cell crisis  - Current chemo regimen: Rituxan and prednisone q3 weeks (C1 1/18/24, C2 2/8/24, Missed C3 d/t sickle cell pain crisis, C4 4/4/24, C5 5/16/24, C6 6/7/24)  - Per pt no longer taking Acyclovir 400mg BID prophy   - PET CT (7/25) overall showing great response to treatment with persistent residual viable disease involving a left common iliac node  - PET/CT (11/18) showing Interval increased metabolic activity within upper abdominal and  bilateral inguinal lymph nodes compatible with interval worsening of lymphomatous disease  - Last FUV with Dr. Stoll 11/21, d/w pt and mother regarding tx options vs observation. No current treatment at this time and will continue with observation and plan for repeat CT/PET in 3 months and follow up after that  - Next PET/CT 3/4, Dr. Stoll FUV 3/13     # Hx of nocturnal oxygen dependence and hypoxia on room air  - Pt currently has home 2L supp O2   - Wean as able, encourage incentive spirometry  - Pulm FUV 8/8- \"Given his history of sickle cell disease, it is important to ensure he has formal sleep testing to look at desaturations and presence of sleep apnea despite not having a convincing history/body habitus typical for suspecting sleep apnea- patients with sickle cell have a higher prevalence of sleep disorders including nocturnal hypoxemia\"--> rec sleep referral for formal testing if deemed appropriate, ABG and O2 destat testing, and TTE with bubble study  - TTE 8/23 showing EF 60-65%, severely dilated LV and LA, + intrapulmonary shunting  - Has " Pulm FUV 2/13  - Pt currently on 2L supp O2  - Encourage IS     # DVT prophy:   - Lovenox subcutaneous, SCDs, encourage ambulation     # DISPO:  - Full code, confirmed on admit  - DC pending improvement in pain and osteo workup   - SUSIE Narayan (Parent) [phone]  - Cardiology FUV 1/28, Cholecystectomy 1/31, Pulm EPV 2/13, PET/CT 3/4, Dr. Stoll FUV 3/13, Sickle Cell FUV 4/9

## 2025-01-27 ENCOUNTER — APPOINTMENT (OUTPATIENT)
Dept: RADIOLOGY | Facility: HOSPITAL | Age: 25
DRG: 812 | End: 2025-01-27
Payer: COMMERCIAL

## 2025-01-27 LAB
ALBUMIN SERPL BCP-MCNC: 4.5 G/DL (ref 3.4–5)
ALP SERPL-CCNC: 196 U/L (ref 33–120)
ALT SERPL W P-5'-P-CCNC: 30 U/L (ref 10–52)
ANION GAP SERPL CALC-SCNC: 13 MMOL/L (ref 10–20)
AST SERPL W P-5'-P-CCNC: 48 U/L (ref 9–39)
BASOPHILS # BLD MANUAL: 0.1 X10*3/UL (ref 0–0.1)
BASOPHILS NFR BLD MANUAL: 0.9 %
BILIRUB SERPL-MCNC: 8 MG/DL (ref 0–1.2)
BUN SERPL-MCNC: 10 MG/DL (ref 6–23)
CALCIUM SERPL-MCNC: 9.9 MG/DL (ref 8.6–10.6)
CHLORIDE SERPL-SCNC: 101 MMOL/L (ref 98–107)
CO2 SERPL-SCNC: 27 MMOL/L (ref 21–32)
CREAT SERPL-MCNC: 0.46 MG/DL (ref 0.5–1.3)
CRP SERPL-MCNC: 6.4 MG/DL
EGFRCR SERPLBLD CKD-EPI 2021: >90 ML/MIN/1.73M*2
EOSINOPHIL # BLD MANUAL: 0.46 X10*3/UL (ref 0–0.7)
EOSINOPHIL NFR BLD MANUAL: 4.3 %
ERYTHROCYTE [DISTWIDTH] IN BLOOD BY AUTOMATED COUNT: 23.8 % (ref 11.5–14.5)
GLUCOSE SERPL-MCNC: 85 MG/DL (ref 74–99)
HCT VFR BLD AUTO: 25.3 % (ref 41–52)
HGB BLD-MCNC: 8.5 G/DL (ref 13.5–17.5)
HGB RETIC QN: 29 PG (ref 28–38)
HYPOCHROMIA BLD QL SMEAR: ABNORMAL
IMM GRANULOCYTES # BLD AUTO: 0.12 X10*3/UL (ref 0–0.7)
IMM GRANULOCYTES NFR BLD AUTO: 1.1 % (ref 0–0.9)
IMMATURE RETIC FRACTION: 36.7 %
LDH SERPL L TO P-CCNC: 443 U/L (ref 84–246)
LYMPHOCYTES # BLD MANUAL: 1.82 X10*3/UL (ref 1.2–4.8)
LYMPHOCYTES NFR BLD MANUAL: 17.2 %
MCH RBC QN AUTO: 28.6 PG (ref 26–34)
MCHC RBC AUTO-ENTMCNC: 33.6 G/DL (ref 32–36)
MCV RBC AUTO: 85 FL (ref 80–100)
MONOCYTES # BLD MANUAL: 0.82 X10*3/UL (ref 0.1–1)
MONOCYTES NFR BLD MANUAL: 7.7 %
MYELOCYTES # BLD MANUAL: 0.1 X10*3/UL
MYELOCYTES NFR BLD MANUAL: 0.9 %
NEUTS SEG # BLD MANUAL: 7.31 X10*3/UL (ref 1.2–7)
NEUTS SEG NFR BLD MANUAL: 69 %
NRBC BLD-RTO: 2.4 /100 WBCS (ref 0–0)
PLATELET # BLD AUTO: 289 X10*3/UL (ref 150–450)
POLYCHROMASIA BLD QL SMEAR: ABNORMAL
POTASSIUM SERPL-SCNC: 4.1 MMOL/L (ref 3.5–5.3)
PROT SERPL-MCNC: 7.3 G/DL (ref 6.4–8.2)
RBC # BLD AUTO: 2.97 X10*6/UL (ref 4.5–5.9)
RBC MORPH BLD: ABNORMAL
RETICS #: 0.62 X10*6/UL (ref 0.02–0.12)
RETICS/RBC NFR AUTO: 20.8 % (ref 0.5–2)
SCHISTOCYTES BLD QL SMEAR: ABNORMAL
SICKLE CELLS BLD QL SMEAR: ABNORMAL
SODIUM SERPL-SCNC: 137 MMOL/L (ref 136–145)
TARGETS BLD QL SMEAR: ABNORMAL
TOTAL CELLS COUNTED BLD: 116
WBC # BLD AUTO: 10.6 X10*3/UL (ref 4.4–11.3)

## 2025-01-27 PROCEDURE — 78804 RP LOCLZJ TUM WHBDY 2+D IMG: CPT

## 2025-01-27 PROCEDURE — 2500000001 HC RX 250 WO HCPCS SELF ADMINISTERED DRUGS (ALT 637 FOR MEDICARE OP): Performed by: PHYSICIAN ASSISTANT

## 2025-01-27 PROCEDURE — 86140 C-REACTIVE PROTEIN: CPT | Performed by: PHYSICIAN ASSISTANT

## 2025-01-27 PROCEDURE — 85045 AUTOMATED RETICULOCYTE COUNT: CPT | Performed by: NURSE PRACTITIONER

## 2025-01-27 PROCEDURE — 2500000001 HC RX 250 WO HCPCS SELF ADMINISTERED DRUGS (ALT 637 FOR MEDICARE OP): Performed by: NURSE PRACTITIONER

## 2025-01-27 PROCEDURE — 2500000005 HC RX 250 GENERAL PHARMACY W/O HCPCS: Performed by: NURSE PRACTITIONER

## 2025-01-27 PROCEDURE — 85027 COMPLETE CBC AUTOMATED: CPT | Performed by: NURSE PRACTITIONER

## 2025-01-27 PROCEDURE — 80053 COMPREHEN METABOLIC PANEL: CPT | Performed by: NURSE PRACTITIONER

## 2025-01-27 PROCEDURE — 36415 COLL VENOUS BLD VENIPUNCTURE: CPT | Performed by: NURSE PRACTITIONER

## 2025-01-27 PROCEDURE — 1170000001 HC PRIVATE ONCOLOGY ROOM DAILY

## 2025-01-27 PROCEDURE — 83615 LACTATE (LD) (LDH) ENZYME: CPT | Performed by: NURSE PRACTITIONER

## 2025-01-27 PROCEDURE — 2500000004 HC RX 250 GENERAL PHARMACY W/ HCPCS (ALT 636 FOR OP/ED): Mod: JZ | Performed by: PHYSICIAN ASSISTANT

## 2025-01-27 PROCEDURE — 3430000001 HC RX 343 DIAGNOSTIC RADIOPHARMACEUTICALS: Performed by: INTERNAL MEDICINE

## 2025-01-27 PROCEDURE — A9547 IN111 OXYQUINOLINE: HCPCS | Performed by: INTERNAL MEDICINE

## 2025-01-27 PROCEDURE — 2500000005 HC RX 250 GENERAL PHARMACY W/O HCPCS

## 2025-01-27 PROCEDURE — 99233 SBSQ HOSP IP/OBS HIGH 50: CPT | Performed by: PHYSICIAN ASSISTANT

## 2025-01-27 PROCEDURE — 85007 BL SMEAR W/DIFF WBC COUNT: CPT | Performed by: NURSE PRACTITIONER

## 2025-01-27 RX ORDER — HYDROMORPHONE HYDROCHLORIDE 1 MG/ML
3 INJECTION, SOLUTION INTRAMUSCULAR; INTRAVENOUS; SUBCUTANEOUS EVERY 2 HOUR PRN
Status: DISCONTINUED | OUTPATIENT
Start: 2025-01-27 | End: 2025-01-28 | Stop reason: HOSPADM

## 2025-01-27 RX ORDER — HYDROMORPHONE HYDROCHLORIDE 2 MG/ML
3 INJECTION, SOLUTION INTRAMUSCULAR; INTRAVENOUS; SUBCUTANEOUS EVERY 2 HOUR PRN
Status: DISCONTINUED | OUTPATIENT
Start: 2025-01-27 | End: 2025-01-27

## 2025-01-27 RX ORDER — LORAZEPAM 0.5 MG/1
0.5 TABLET ORAL ONCE
Status: COMPLETED | OUTPATIENT
Start: 2025-01-27 | End: 2025-01-28

## 2025-01-27 RX ADMIN — LIDOCAINE 2 PATCH: 560 PATCH PERCUTANEOUS; TOPICAL; TRANSDERMAL at 08:10

## 2025-01-27 RX ADMIN — FOLIC ACID 1 MG: 1 TABLET ORAL at 08:07

## 2025-01-27 RX ADMIN — HYDROMORPHONE HYDROCHLORIDE 3 MG: 1 INJECTION, SOLUTION INTRAMUSCULAR; INTRAVENOUS; SUBCUTANEOUS at 18:17

## 2025-01-27 RX ADMIN — PANTOPRAZOLE SODIUM 40 MG: 40 TABLET, DELAYED RELEASE ORAL at 06:20

## 2025-01-27 RX ADMIN — HYDROMORPHONE HYDROCHLORIDE 5 MG: 2 INJECTION, SOLUTION INTRAMUSCULAR; INTRAVENOUS; SUBCUTANEOUS at 05:34

## 2025-01-27 RX ADMIN — HYDROCORTISONE: 1 CREAM TOPICAL at 20:21

## 2025-01-27 RX ADMIN — HYDROMORPHONE HYDROCHLORIDE 5 MG: 2 INJECTION, SOLUTION INTRAMUSCULAR; INTRAVENOUS; SUBCUTANEOUS at 08:07

## 2025-01-27 RX ADMIN — LIDOCAINE 1 PATCH: 560 PATCH PERCUTANEOUS; TOPICAL; TRANSDERMAL at 08:10

## 2025-01-27 RX ADMIN — HYDROMORPHONE HYDROCHLORIDE 5 MG: 2 INJECTION, SOLUTION INTRAMUSCULAR; INTRAVENOUS; SUBCUTANEOUS at 01:00

## 2025-01-27 RX ADMIN — Medication 2 L/MIN: at 20:22

## 2025-01-27 RX ADMIN — HYDROMORPHONE HYDROCHLORIDE 5 MG: 2 INJECTION, SOLUTION INTRAMUSCULAR; INTRAVENOUS; SUBCUTANEOUS at 13:49

## 2025-01-27 RX ADMIN — Medication 21 PERCENT: at 08:14

## 2025-01-27 RX ADMIN — HYDROMORPHONE HYDROCHLORIDE 3 MG: 1 INJECTION, SOLUTION INTRAMUSCULAR; INTRAVENOUS; SUBCUTANEOUS at 20:21

## 2025-01-27 RX ADMIN — HYDROMORPHONE HYDROCHLORIDE 5 MG: 2 INJECTION, SOLUTION INTRAMUSCULAR; INTRAVENOUS; SUBCUTANEOUS at 11:03

## 2025-01-27 RX ADMIN — HYDROMORPHONE HYDROCHLORIDE 3 MG: 1 INJECTION, SOLUTION INTRAMUSCULAR; INTRAVENOUS; SUBCUTANEOUS at 16:25

## 2025-01-27 RX ADMIN — HYDROMORPHONE HYDROCHLORIDE 3 MG: 1 INJECTION, SOLUTION INTRAMUSCULAR; INTRAVENOUS; SUBCUTANEOUS at 22:27

## 2025-01-27 RX ADMIN — HYDROMORPHONE HYDROCHLORIDE 5 MG: 2 INJECTION, SOLUTION INTRAMUSCULAR; INTRAVENOUS; SUBCUTANEOUS at 03:13

## 2025-01-27 RX ADMIN — HYDROCORTISONE 1 APPLICATION: 1 CREAM TOPICAL at 08:11

## 2025-01-27 RX ADMIN — INDIUM IN-111 OXYQUINOLINE 0.61 MILLICURIE: 1 SOLUTION INTRAVENOUS at 13:00

## 2025-01-27 ASSESSMENT — COGNITIVE AND FUNCTIONAL STATUS - GENERAL
CLIMB 3 TO 5 STEPS WITH RAILING: A LITTLE
MOBILITY SCORE: 23
CLIMB 3 TO 5 STEPS WITH RAILING: A LITTLE
MOBILITY SCORE: 23
DAILY ACTIVITIY SCORE: 24
DAILY ACTIVITIY SCORE: 24

## 2025-01-27 ASSESSMENT — PAIN SCALES - GENERAL
PAINLEVEL_OUTOF10: 7
PAINLEVEL_OUTOF10: 8
PAINLEVEL_OUTOF10: 8
PAINLEVEL_OUTOF10: 7
PAINLEVEL_OUTOF10: 8
PAINLEVEL_OUTOF10: 7
PAINLEVEL_OUTOF10: 9
PAINLEVEL_OUTOF10: 6
PAINLEVEL_OUTOF10: 8
PAINLEVEL_OUTOF10: 5 - MODERATE PAIN
PAINLEVEL_OUTOF10: 7
PAINLEVEL_OUTOF10: 6
PAINLEVEL_OUTOF10: 8
PAINLEVEL_OUTOF10: 7
PAINLEVEL_OUTOF10: 7
PAINLEVEL_OUTOF10: 5 - MODERATE PAIN
PAINLEVEL_OUTOF10: 7

## 2025-01-27 ASSESSMENT — PAIN DESCRIPTION - LOCATION
LOCATION: GENERALIZED

## 2025-01-27 NOTE — ASSESSMENT & PLAN NOTE
Joellen Narayan is a 24 y.o. 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 Hgb and lysis labs at baseline. CXR (1/19) without acute process. Admitted for further management on pain. Started on IV dilaudid per care plan and transitioned to dPCA pump on 1/21 for further pain control. Xray right leg and left shoulder unremarkable, no AVN (1/21). Bilirubin and lysis labs worsening (1/21), CRP elevated. MRI left shoulder and right tib/fib (1/23) concerning for osteomyelitis vs sequelae of sickle cell disease. HDS, afebrile since 1/21. Blood cultures from 1/21 NGTD. Ortho consulted, no acute interventions, recommended ID consult. ID consulted (1/23), okay to monitor off antibiotics and recommended a nuclear bone scan. If patient were to become HD unstable or febrile, re-culture and start broad spectrum antibiotics. Nuclear bone scan tentatively scheduled for 1/27 or 1/28. On 1/23, patient transitioned off dPCA and restarted on Q2hr pain medication. DC pending improvement in pain and osteomyelitis workup.       # Updates 1/27:   - Decrease IV  Dilaudid to 3mg Q2hrs for pain   - Plan for  In111 WBC scan on Tues 1/28. No prep is needed/ does not need to be NPO. Ativan 0.5mg PO X1 ordered for scan   - Trend CRP per Dr. Cruz   - Plan DC after WBC scan and ID recs      # C/f osteomyelitis   # Isolated fever   - febrile 1/20, max temp 38.1  - blood cultures 1/21 NGTD   - UA/urine cult negative 1/21  - 1/21 Xray right leg and left shoulder unremarkable, no AVN, may need MRI of leg pain persists    - sed rate negative (1/21)  - CRP  13.31 (1/21) --> 12.66 (1/22) --> 12.62 (1/23) --> 12.77 (1/24); trend daily per Dr. Cruz   - 1/22 reporting improvement in pain but will order MRI left shoulder and MRI right tib/fib to rule out osteonecrosis vs osteomyelitis given worsening lysis labs and elevated CRP  - Right ankle chronic wound, small tear from scratching; no signs of infection, swelling or drainage; CTM  - MR right tib/fib (1/23) signal intensity seen in the tibia. difficult to delineate whether or not the changes are related to sickle cell disease and/or osteomyelitis and it is possible that both processes are present. A dual isotope nuclear medicine study may be helpful for further assessment. Edema in the pretibial soft tissues and surrounding muscles favored to be reactive with infectious myositis not entirely excluded.  - MR left shoulder (1/23) signal abnormality of the intramedullary cavity of the humeral diaphysis with associated periosteal reaction. This could be related to patient's known sickle cell disease although osteomyelitis is another diagnostic consideration. Both could be present. MRI of the humerus is recommended to assess the entirety of the process.  - MR left humerus (1/23): Diffuse heterogeneity of the humeral diaphysis extending to the humeral condyles with bone marrow edema and associated periosteal edema. findings could be related to the patient's known sickle cell crisis and represent osteonecrosis. Superimposed osteomyelitis remains in the differential. Dual isotope nuclear medicine study could be helpful for further assessment given that this process is multifocal and involves the tibia as well. Edema in the perihumeral soft tissues and surrounding muscles could be reactive however infectious myositis is not entirely excluded. Edema about the radial nerve and adjacent vasculature. There is questionable abscess of flow void of adjacent venous structures which may reflect thrombophlebitis. Consider Doppler ultrasound  of the left upper extremity.  - 1/23 CK negative low concern for myositis  - Vasc US duplex UE (1/24) negative for DVT   - Ortho consulted (1/23) No acute orthopaedic surgical intervention, Recommend ID consult for recommendations, If c/f osteo per ID, recommend IV abx, Ortho on standby for bony debridement, WBAT RLE and LUE  - ID consulted (1/23) okay to monitor off antibiotics, follow blood cultures from 1/21 (NGTD), pursue nuclear imaging to further assess osteomyelitis   - Per nuclear medicine; ordered for a In111 WBC scan set up for Monday, blood  at 8am. Then they will reinject the blood sometime around noon and will scan him on Tues 1/28. No prep is needed/ does not need to be NPO   - NM bone marrow whole body pending (to be done Tuesday 1/28) per nuclear medicine attending, Dr. Skelton   - NM Inflammation whole body with indium 111 ordered per nuclear medicine attending, Dr. Skelton   - If patient becomes HD unstable or febrile; re-culture and cover broad spectrum   - ID following      # Hgb SS with Pain  - OARRS reviewed, no aberrancies  - No current care path  - Hgb baseline ~8.5; Hgb 10.7 (1/19); no indication for transfusion  - Tbili baseline fluctuates ~5 (planning for cholecystectomy 1/30); Tbili 7.2 (1/19), T.bili elevated to 12.8, direct 1.9   - LDH baseline ~500;  (1/19) --> 450 (1/22)   - s/p 1L IV LR in ED 1/19, plan IV hydration with D51/2 X 12 hrs   - s/p IV dilaudid 3mg q2hrs PRN severe pain, increased to IV Dilaudid 4mg q2 hrs PRN (1/20-1/24), now increased to Dilaudid 5mg q2 hrs PRN--> IV Dilaudid 3mg q2 hrs PRN   - s/p dPCA for uncontrolled pain (1/21-1/22)  - 1/23 Transitioned off PCA and restarted 4mg IVP dilaudid Q2hrs   - Started IV Toradol 15 mg q6 X 3 days with PPI (1/19)   - Started Lidocaine patch   - Continue home folic acid 1mg daily  - Pre exchange labs sent 1/19 d/t reported severity of pain  - Hgb S (1/19): 58.6%   - Utox (1/19): positive for cannabinoid   - PO Zofran  PRN for opioid-induced nausea and PO Benadryl PRN for opioid-induced pruritus  - Bowel regimen for opioid-induced constipation with DocuSenna 2tabs BID and Miralax daily; LBM 1/23   - Monitor need for exchange if labs and pain worsens, exchange labs sent 1/21      # Hx of Priapism  - Recent admission c/b worsening priapism needing urgent [facility] (9/19)  - Denies recurrent issues with priapism outpt recently  - Continue Sudafed 60mg q8h PRN for priapism      # Recent Choledocholithiasis  # Plan for Cholecystectomy 1/31/25  - s/p ERCP 7/18/24 with a biliary sphincterotomy where sludge and stones were removed, achieving complete clearance  - Seen by gen surg 8/8/24 Dr. Dove who rec lap cholecystectomy d/t the chance of recurrence of choledocholithiasis  - Tbili baseline fluctuates ~5; Tbili 7.2 (1/19) --> 13.8 (1/22) --> 11.4 (1/24)   - direct bili 1.9 (1/21)   - No active abd pain on admit; low threshold for CT a/p or RUQ US if abdominal pain occurs  - Planning for cholecystectomy outpt 1/31/25      # Nodular lymphocyte predominant Hodgkins lymphoma (NLPHL)   - Primary Oncologist: Dr. Stoll  - Enlarged lymph nodes noted 4/1/22  - RUQ US (11/14/22) with mildly enlarged LNs in the region of the kavin hepatis  - MRI liver (11/16/22) showed re-demonstration of bulky retroperitoneal lymphadenopathy and kavin hepatic lymphadenopathy    - (11/18/22) lymph node biopsy showed atypical lymphoid infiltrate. Reviewed by Hemepath board, insufficient for lymphoma diagnosis  - PET/CT (12/6/22) showing retroperitoneal lymphadenopathy  - Followed up with Dr. Stoll (12/16/22) with plan for surg/onc consult for large tissue bx but patient missed apt and was lost to follow up  - Requested new apt with Dr. Ronnie Marte 6/19/23, patient was not seen and lost to follow up  - CT a/p (11/28/23) increased size of retroperitoneal lymph nodes reflecting extramedullary hematopoiesis I/s/o sickle cell vs lymphoma  - Paraaortic LN bx via IR  "(11/30/23) consistent with NLPHL. Flow: no clonal B cell or T cell population identified, lymphocyte 95%, CD3+CD4+ 68%, CD3+CD8+ 7%, CD19+ 24%  - Elevated LDH/bili partially from sickle cell disease   - Chemotherapy (R-CHOP) was discussed with primary oncologist Dr. Stoll, and decision was made to simplify his chemotherapy to Rituxan and prednisone q3 weeks mainly due to frequent sickle cell crisis  - Current chemo regimen: Rituxan and prednisone q3 weeks (C1 1/18/24, C2 2/8/24, Missed C3 d/t sickle cell pain crisis, C4 4/4/24, C5 5/16/24, C6 6/7/24)  - Per pt no longer taking Acyclovir 400mg BID prophy   - PET CT (7/25) overall showing great response to treatment with persistent residual viable disease involving a left common iliac node  - PET/CT (11/18) showing Interval increased metabolic activity within upper abdominal and  bilateral inguinal lymph nodes compatible with interval worsening of lymphomatous disease  - Last FUV with Dr. Stoll 11/21, d/w pt and mother regarding tx options vs observation. No current treatment at this time and will continue with observation and plan for repeat CT/PET in 3 months and follow up after that  - Next PET/CT 3/4, Dr. Stoll FUV 3/13     # Hx of nocturnal oxygen dependence and hypoxia on room air  - Pt currently has home 2L supp O2   - Wean as able, encourage incentive spirometry  - Pulm FUV 8/8- \"Given his history of sickle cell disease, it is important to ensure he has formal sleep testing to look at desaturations and presence of sleep apnea despite not having a convincing history/body habitus typical for suspecting sleep apnea- patients with sickle cell have a higher prevalence of sleep disorders including nocturnal hypoxemia\"--> rec sleep referral for formal testing if deemed appropriate, ABG and O2 destat testing, and TTE with bubble study  - TTE 8/23 showing EF 60-65%, severely dilated LV and LA, + intrapulmonary shunting  - Has Pul FUV 2/13  - Pt currently on 2L " supp O2  - Encourage IS     # DVT prophy:   - Lovenox subcutaneous, SCDs, encourage ambulation     # DISPO:  - Full code, confirmed on admit  - DC pending improvement in pain and osteo workup and ID final recs   - SUSIE Narayan (Parent) [phone]  - Cardiology FUV 1/28, Cholecystectomy 1/31, Pulm EPV 2/13, PET/CT 3/4, Dr. Stoll FUV 3/13, Sickle Cell FUV 4/9

## 2025-01-27 NOTE — PROGRESS NOTES
"Joellen Narayan is a 24 y.o. male on day 8 of admission presenting with Sickle cell pain crisis (Multi).    Subjective   Seen this morning at bedside, pain is better, went down to start prep for scan tomorrow. His pain has significantly improved since admission and we discussed titrating IV Dilaudid down from 5mg q2PRN to 3mg q2PRN.  Had a BM today. Denies chest pain, SOB, N/V, abd pain, abd pain.     Objective     Physical Exam  Constitutional:       Appearance: Normal appearance.   HENT:      Head: Normocephalic and atraumatic.   Eyes:      Extraocular Movements: Extraocular movements intact.   Cardiovascular:      Rate and Rhythm: Normal rate and regular rhythm.   Pulmonary:      Effort: Pulmonary effort is normal.      Breath sounds: Normal breath sounds.   Abdominal:      General: Abdomen is flat.      Palpations: Abdomen is soft.      Tenderness: There is no abdominal tenderness.   Musculoskeletal:         General: Normal range of motion.   Skin:     General: Skin is warm and dry.   Neurological:      General: No focal deficit present.      Mental Status: He is alert and oriented to person, place, and time.   Psychiatric:         Mood and Affect: Mood normal.         Behavior: Behavior normal.       Last Recorded Vitals  Blood pressure 131/72, pulse 75, temperature 36.6 °C (97.9 °F), temperature source Temporal, resp. rate 16, height 1.88 m (6' 2\"), weight 72.6 kg (160 lb), SpO2 94%.  Intake/Output last 3 Shifts:  I/O last 3 completed shifts:  In: - (0 mL/kg)   Out: 1650 (22.7 mL/kg) [Urine:1650 (0.6 mL/kg/hr)]  Weight: 72.6 kg     Relevant Results  XR chest 2 views  Result Date: 1/19/2025  Cardiomegaly but no evidence of other acute intrathoracic abnormality.   Signed by: Wyatt Hicks 1/19/2025 8:57 AM Dictation workstation:   [id number]     right upper quadrant  Result Date: 12/28/2024  Cholelithiasis without evidence of acute cholecystitis.   Hepatomegaly with hepatic steatosis.   I personally reviewed " the images/study and I agree with the findings as stated by resident physician Dr. José Miguel Flowers . This study was interpreted at [facility], [place], [place]..   MACRO: None   Signed by: Jamshid Enrique 12/28/2024 7:43 AM Dictation workstation:   [id number]    Scheduled medications  enoxaparin, 40 mg, subcutaneous, q24h  folic acid, 1 mg, oral, Daily  hydrocortisone, , Topical, BID  lidocaine, 1 patch, transdermal, Daily  lidocaine, 2 patch, transdermal, Daily  oxygen, , inhalation, Continuous - Inhalation  pantoprazole, 40 mg, oral, Daily before breakfast  polyethylene glycol, 17 g, oral, Daily  sennosides-docusate sodium, 2 tablet, oral, BID      Continuous medications  PRN medications  PRN medications: diphenhydrAMINE, HYDROmorphone, melatonin, naloxone, ondansetron, pseudoephedrine     Assessment/Plan   Assessment & Plan  Hodgkin's paragranuloma (Multi)    Sickle cell anemia with pain (Multi)  Joellen Narayan is a 24 y.o. 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 Hgb and lysis labs at baseline. CXR (1/19) without acute process. Admitted for further management on pain. Started on IV dilaudid per care plan and transitioned to dPCA pump on 1/21 for further pain control. Xray right leg and left shoulder unremarkable, no AVN (1/21). Bilirubin and lysis labs worsening (1/21), CRP elevated. MRI left shoulder and right tib/fib (1/23) concerning for osteomyelitis vs sequelae of sickle cell disease. HDS, afebrile since 1/21. Blood cultures from 1/21 NGTD. Ortho consulted, no  acute interventions, recommended ID consult. ID consulted (1/23), okay to monitor off antibiotics and recommended a nuclear bone scan. If patient were to become HD unstable or febrile, re-culture and start broad spectrum antibiotics. Nuclear bone scan tentatively scheduled for 1/27 or 1/28. On 1/23, patient transitioned off dPCA and restarted on Q2hr pain medication. DC pending improvement in pain and osteomyelitis workup.       # Updates 1/27:   - Decrease IV  Dilaudid to 3mg Q2hrs for pain   - Plan for  In111 WBC scan on Tues 1/28. No prep is needed/ does not need to be NPO. Ativan 0.5mg PO X1 ordered for scan   - Trend CRP per Dr. Cruz   - Plan DC after WBC scan and ID recs      # C/f osteomyelitis   # Isolated fever   - febrile 1/20, max temp 38.1  - blood cultures 1/21 NGTD   - UA/urine cult negative 1/21  - 1/21 Xray right leg and left shoulder unremarkable, no AVN, may need MRI of leg pain persists    - sed rate negative (1/21)  - CRP 13.31 (1/21) --> 12.66 (1/22) --> 12.62 (1/23) --> 12.77 (1/24); trend daily per Dr. Cruz   - 1/22 reporting improvement in pain but will order MRI left shoulder and MRI right tib/fib to rule out osteonecrosis vs osteomyelitis given worsening lysis labs and elevated CRP  - Right ankle chronic wound, small tear from scratching; no signs of infection, swelling or drainage; CTM  - MR right tib/fib (1/23) signal intensity seen in the tibia. difficult to delineate whether or not the changes are related to sickle cell disease and/or osteomyelitis and it is possible that both processes are present. A dual isotope nuclear medicine study may be helpful for further assessment. Edema in the pretibial soft tissues and surrounding muscles favored to be reactive with infectious myositis not entirely excluded.  - MR left shoulder (1/23) signal abnormality of the intramedullary cavity of the humeral diaphysis with associated periosteal reaction. This could be related to patient's known sickle  cell disease although osteomyelitis is another diagnostic consideration. Both could be present. MRI of the humerus is recommended to assess the entirety of the process.  - MR left humerus (1/23): Diffuse heterogeneity of the humeral diaphysis extending to the humeral condyles with bone marrow edema and associated periosteal edema. findings could be related to the patient's known sickle cell crisis and represent osteonecrosis. Superimposed osteomyelitis remains in the differential. Dual isotope nuclear medicine study could be helpful for further assessment given that this process is multifocal and involves the tibia as well. Edema in the perihumeral soft tissues and surrounding muscles could be reactive however infectious myositis is not entirely excluded. Edema about the radial nerve and adjacent vasculature. There is questionable abscess of flow void of adjacent venous structures which may reflect thrombophlebitis. Consider Doppler ultrasound of the left upper extremity.  - 1/23 CK negative low concern for myositis  - Vasc US duplex UE (1/24) negative for DVT   - Ortho consulted (1/23) No acute orthopaedic surgical intervention, Recommend ID consult for recommendations, If c/f osteo per ID, recommend IV abx, Ortho on standby for bony debridement, WBAT RLE and LUE  - ID consulted (1/23) okay to monitor off antibiotics, follow blood cultures from 1/21 (NGTD), pursue nuclear imaging to further assess osteomyelitis   - Per nuclear medicine; ordered for a In111 WBC scan set up for Monday, blood  at 8am. Then they will reinject the blood sometime around noon and will scan him on Tues 1/28. No prep is needed/ does not need to be NPO   - NM bone marrow whole body pending (to be done Tuesday 1/28) per nuclear medicine attending, Dr. Skelton   - NM Inflammation whole body with indium 111 ordered per nuclear medicine attending, Dr. Skelton   - If patient becomes HD unstable or febrile; re-culture and cover broad spectrum    - ID following      # Hgb SS with Pain  - OARRS reviewed, no aberrancies  - No current care path  - Hgb baseline ~8.5; Hgb 10.7 (1/19); no indication for transfusion  - Tbili baseline fluctuates ~5 (planning for cholecystectomy 1/30); Tbili 7.2 (1/19), T.bili elevated to 12.8, direct 1.9   - LDH baseline ~500;  (1/19) --> 450 (1/22)   - s/p 1L IV LR in ED 1/19, plan IV hydration with D51/2 X 12 hrs   - s/p IV dilaudid 3mg q2hrs PRN severe pain, increased to IV Dilaudid 4mg q2 hrs PRN (1/20-1/24), now increased to Dilaudid 5mg q2 hrs PRN--> IV Dilaudid 3mg q2 hrs PRN   - s/p dPCA for uncontrolled pain (1/21-1/22)  - 1/23 Transitioned off PCA and restarted 4mg IVP dilaudid Q2hrs   - Started IV Toradol 15 mg q6 X 3 days with PPI (1/19)   - Started Lidocaine patch   - Continue home folic acid 1mg daily  - Pre exchange labs sent 1/19 d/t reported severity of pain  - Hgb S (1/19): 58.6%   - Utox (1/19): positive for cannabinoid   - PO Zofran PRN for opioid-induced nausea and PO Benadryl PRN for opioid-induced pruritus  - Bowel regimen for opioid-induced constipation with DocuSenna 2tabs BID and Miralax daily; LBM 1/23   - Monitor need for exchange if labs and pain worsens, exchange labs sent 1/21      # Hx of Priapism  - Recent admission c/b worsening priapism needing urgent [facility] (9/19)  - Denies recurrent issues with priapism outpt recently  - Continue Sudafed 60mg q8h PRN for priapism      # Recent Choledocholithiasis  # Plan for Cholecystectomy 1/31/25  - s/p ERCP 7/18/24 with a biliary sphincterotomy where sludge and stones were removed, achieving complete clearance  - Seen by gen surg 8/8/24 Dr. Dove who rec lap cholecystectomy d/t the chance of recurrence of choledocholithiasis  - Tbili baseline fluctuates ~5; Tbili 7.2 (1/19) --> 13.8 (1/22) --> 11.4 (1/24)   - direct bili 1.9 (1/21)   - No active abd pain on admit; low threshold for CT a/p or RUQ US if abdominal pain occurs  - Planning for  cholecystectomy outpt 1/31/25      # Nodular lymphocyte predominant Hodgkins lymphoma (NLPHL)   - Primary Oncologist: Dr. Stoll  - Enlarged lymph nodes noted 4/1/22  - RUQ US (11/14/22) with mildly enlarged LNs in the region of the kavin hepatis  - MRI liver (11/16/22) showed re-demonstration of bulky retroperitoneal lymphadenopathy and kavin hepatic lymphadenopathy    - (11/18/22) lymph node biopsy showed atypical lymphoid infiltrate. Reviewed by Hemepath board, insufficient for lymphoma diagnosis  - PET/CT (12/6/22) showing retroperitoneal lymphadenopathy  - Followed up with Dr. Stoll (12/16/22) with plan for surg/onc consult for large tissue bx but patient missed apt and was lost to follow up  - Requested new apt with Dr. Ronnie Marte 6/19/23, patient was not seen and lost to follow up  - CT a/p (11/28/23) increased size of retroperitoneal lymph nodes reflecting extramedullary hematopoiesis I/s/o sickle cell vs lymphoma  - Paraaortic LN bx via IR (11/30/23) consistent with NLPHL. Flow: no clonal B cell or T cell population identified, lymphocyte 95%, CD3+CD4+ 68%, CD3+CD8+ 7%, CD19+ 24%  - Elevated LDH/bili partially from sickle cell disease   - Chemotherapy (R-CHOP) was discussed with primary oncologist Dr. Stoll, and decision was made to simplify his chemotherapy to Rituxan and prednisone q3 weeks mainly due to frequent sickle cell crisis  - Current chemo regimen: Rituxan and prednisone q3 weeks (C1 1/18/24, C2 2/8/24, Missed C3 d/t sickle cell pain crisis, C4 4/4/24, C5 5/16/24, C6 6/7/24)  - Per pt no longer taking Acyclovir 400mg BID prophy   - PET CT (7/25) overall showing great response to treatment with persistent residual viable disease involving a left common iliac node  - PET/CT (11/18) showing Interval increased metabolic activity within upper abdominal and  bilateral inguinal lymph nodes compatible with interval worsening of lymphomatous disease  - Last FUV with Dr. Stoll 11/21, d/w pt and  "mother regarding tx options vs observation. No current treatment at this time and will continue with observation and plan for repeat CT/PET in 3 months and follow up after that  - Next PET/CT 3/4, Dr. Stoll FU 3/13     # Hx of nocturnal oxygen dependence and hypoxia on room air  - Pt currently has home 2L supp O2   - Wean as able, encourage incentive spirometry  - Pulm FU 8/8- \"Given his history of sickle cell disease, it is important to ensure he has formal sleep testing to look at desaturations and presence of sleep apnea despite not having a convincing history/body habitus typical for suspecting sleep apnea- patients with sickle cell have a higher prevalence of sleep disorders including nocturnal hypoxemia\"--> rec sleep referral for formal testing if deemed appropriate, ABG and O2 destat testing, and TTE with bubble study  - TTE 8/23 showing EF 60-65%, severely dilated LV and LA, + intrapulmonary shunting  - Has Pulm FUV 2/13  - Pt currently on 2L supp O2  - Encourage IS     # DVT prophy:   - Lovenox subcutaneous, SCDs, encourage ambulation     # DISPO:  - Full code, confirmed on admit  - DC pending improvement in pain and osteo workup and ID final recs   - SUSIE Narayan (Parent) [phone]  - Cardiology FUV 1/28, Cholecystectomy 1/31, Pulm EPV 2/13, PET/CT 3/4, Dr. Stoll FUV 3/13, Sickle Cell FUV 4/9    I spent 60 minutes in the professional and overall care of this patient.      Sachin Reynolds PA-C  "

## 2025-01-27 NOTE — CARE PLAN
Problem: Pain - Adult  Goal: Verbalizes/displays adequate comfort level or baseline comfort level  Outcome: Progressing     Problem: Safety - Adult  Goal: Free from fall injury  Outcome: Progressing     Problem: Discharge Planning  Goal: Discharge to home or other facility with appropriate resources  Outcome: Progressing     Problem: Chronic Conditions and Co-morbidities  Goal: Patient's chronic conditions and co-morbidity symptoms are monitored and maintained or improved  Outcome: Progressing     Problem: Pain  Goal: Takes deep breaths with improved pain control throughout the shift  Outcome: Progressing  Goal: Turns in bed with improved pain control throughout the shift  Outcome: Progressing  Goal: Walks with improved pain control throughout the shift  Outcome: Progressing  Goal: Performs ADL's with improved pain control throughout shift  Outcome: Progressing  Goal: Participates in PT with improved pain control throughout the shift  Outcome: Progressing  Goal: Free from opioid side effects throughout the shift  Outcome: Progressing  Goal: Free from acute confusion related to pain meds throughout the shift  Outcome: Progressing   The patient's goals for the shift include Rest and Comfort    The clinical goals for the shift include Pt will remain free form falls throughout shift

## 2025-01-27 NOTE — CARE PLAN
The patient's goals for the shift include Rest and Comfort    The clinical goals for the shift include Patient will report decrease pain level < 8 by the end of the shift 1/27/25 @ 0700      Problem: Pain - Adult  Goal: Verbalizes/displays adequate comfort level or baseline comfort level  Outcome: Progressing     Problem: Safety - Adult  Goal: Free from fall injury  Outcome: Progressing     Problem: Discharge Planning  Goal: Discharge to home or other facility with appropriate resources  Outcome: Progressing     Problem: Chronic Conditions and Co-morbidities  Goal: Patient's chronic conditions and co-morbidity symptoms are monitored and maintained or improved  Outcome: Progressing     Problem: Pain  Goal: Takes deep breaths with improved pain control throughout the shift  Outcome: Progressing  Goal: Turns in bed with improved pain control throughout the shift  Outcome: Progressing  Goal: Walks with improved pain control throughout the shift  Outcome: Progressing  Goal: Performs ADL's with improved pain control throughout shift  Outcome: Progressing  Goal: Participates in PT with improved pain control throughout the shift  Outcome: Progressing  Goal: Free from opioid side effects throughout the shift  Outcome: Progressing  Goal: Free from acute confusion related to pain meds throughout the shift  Outcome: Progressing

## 2025-01-28 ENCOUNTER — APPOINTMENT (OUTPATIENT)
Dept: RADIOLOGY | Facility: HOSPITAL | Age: 25
DRG: 812 | End: 2025-01-28
Payer: COMMERCIAL

## 2025-01-28 ENCOUNTER — PHARMACY VISIT (OUTPATIENT)
Dept: PHARMACY | Facility: CLINIC | Age: 25
End: 2025-01-28
Payer: MEDICARE

## 2025-01-28 ENCOUNTER — APPOINTMENT (OUTPATIENT)
Dept: CARDIOLOGY | Facility: CLINIC | Age: 25
End: 2025-01-28
Payer: COMMERCIAL

## 2025-01-28 VITALS
RESPIRATION RATE: 18 BRPM | HEART RATE: 75 BPM | SYSTOLIC BLOOD PRESSURE: 135 MMHG | DIASTOLIC BLOOD PRESSURE: 58 MMHG | HEIGHT: 74 IN | WEIGHT: 160 LBS | BODY MASS INDEX: 20.53 KG/M2 | TEMPERATURE: 97.9 F | OXYGEN SATURATION: 98 %

## 2025-01-28 DIAGNOSIS — D57.00 HB-SS DISEASE WITH CRISIS: Primary | ICD-10-CM

## 2025-01-28 LAB
ALBUMIN SERPL BCP-MCNC: 4.9 G/DL (ref 3.4–5)
ALP SERPL-CCNC: 202 U/L (ref 33–120)
ALT SERPL W P-5'-P-CCNC: 33 U/L (ref 10–52)
ANION GAP SERPL CALC-SCNC: 14 MMOL/L (ref 10–20)
AST SERPL W P-5'-P-CCNC: 51 U/L (ref 9–39)
BASOPHILS # BLD MANUAL: 0 X10*3/UL (ref 0–0.1)
BASOPHILS NFR BLD MANUAL: 0 %
BILIRUB SERPL-MCNC: 7.7 MG/DL (ref 0–1.2)
BUN SERPL-MCNC: 6 MG/DL (ref 6–23)
BURR CELLS BLD QL SMEAR: ABNORMAL
CALCIUM SERPL-MCNC: 10 MG/DL (ref 8.6–10.6)
CHLORIDE SERPL-SCNC: 102 MMOL/L (ref 98–107)
CO2 SERPL-SCNC: 25 MMOL/L (ref 21–32)
CREAT SERPL-MCNC: 0.52 MG/DL (ref 0.5–1.3)
EGFRCR SERPLBLD CKD-EPI 2021: >90 ML/MIN/1.73M*2
EOSINOPHIL # BLD MANUAL: 0 X10*3/UL (ref 0–0.7)
EOSINOPHIL NFR BLD MANUAL: 0 %
ERYTHROCYTE [DISTWIDTH] IN BLOOD BY AUTOMATED COUNT: 23.7 % (ref 11.5–14.5)
GLUCOSE SERPL-MCNC: 104 MG/DL (ref 74–99)
HCT VFR BLD AUTO: 29.1 % (ref 41–52)
HGB BLD-MCNC: 9.4 G/DL (ref 13.5–17.5)
HGB RETIC QN: 33 PG (ref 28–38)
HOWELL-JOLLY BOD BLD QL SMEAR: PRESENT
HYPOCHROMIA BLD QL SMEAR: ABNORMAL
IMM GRANULOCYTES # BLD AUTO: 0.07 X10*3/UL (ref 0–0.7)
IMM GRANULOCYTES NFR BLD AUTO: 0.7 % (ref 0–0.9)
IMMATURE RETIC FRACTION: 34 %
LDH SERPL L TO P-CCNC: 435 U/L (ref 84–246)
LYMPHOCYTES # BLD MANUAL: 2.29 X10*3/UL (ref 1.2–4.8)
LYMPHOCYTES NFR BLD MANUAL: 22 %
MCH RBC QN AUTO: 28.1 PG (ref 26–34)
MCHC RBC AUTO-ENTMCNC: 32.3 G/DL (ref 32–36)
MCV RBC AUTO: 87 FL (ref 80–100)
MONOCYTES # BLD MANUAL: 0.67 X10*3/UL (ref 0.1–1)
MONOCYTES NFR BLD MANUAL: 6.4 %
NEUTS SEG # BLD MANUAL: 7.45 X10*3/UL (ref 1.2–7)
NEUTS SEG NFR BLD MANUAL: 71.6 %
NRBC BLD-RTO: 3.3 /100 WBCS (ref 0–0)
PAPPENHEIMER BOD BLD QL SMEAR: PRESENT
PLATELET # BLD AUTO: 366 X10*3/UL (ref 150–450)
POLYCHROMASIA BLD QL SMEAR: ABNORMAL
POTASSIUM SERPL-SCNC: 3.9 MMOL/L (ref 3.5–5.3)
PROT SERPL-MCNC: 8.2 G/DL (ref 6.4–8.2)
RBC # BLD AUTO: 3.35 X10*6/UL (ref 4.5–5.9)
RBC MORPH BLD: ABNORMAL
RETICS #: 0.73 X10*6/UL (ref 0.02–0.12)
RETICS/RBC NFR AUTO: 21.8 % (ref 0.5–2)
SICKLE CELLS BLD QL SMEAR: ABNORMAL
SODIUM SERPL-SCNC: 137 MMOL/L (ref 136–145)
TARGETS BLD QL SMEAR: ABNORMAL
TOTAL CELLS COUNTED BLD: 109
WBC # BLD AUTO: 10.4 X10*3/UL (ref 4.4–11.3)

## 2025-01-28 PROCEDURE — 2500000005 HC RX 250 GENERAL PHARMACY W/O HCPCS

## 2025-01-28 PROCEDURE — 99239 HOSP IP/OBS DSCHRG MGMT >30: CPT

## 2025-01-28 PROCEDURE — 85045 AUTOMATED RETICULOCYTE COUNT: CPT | Performed by: NURSE PRACTITIONER

## 2025-01-28 PROCEDURE — A9541 TC99M SULFUR COLLOID: HCPCS | Performed by: INTERNAL MEDICINE

## 2025-01-28 PROCEDURE — 80053 COMPREHEN METABOLIC PANEL: CPT | Performed by: NURSE PRACTITIONER

## 2025-01-28 PROCEDURE — 36415 COLL VENOUS BLD VENIPUNCTURE: CPT | Performed by: NURSE PRACTITIONER

## 2025-01-28 PROCEDURE — 2500000001 HC RX 250 WO HCPCS SELF ADMINISTERED DRUGS (ALT 637 FOR MEDICARE OP): Performed by: PHYSICIAN ASSISTANT

## 2025-01-28 PROCEDURE — 83615 LACTATE (LD) (LDH) ENZYME: CPT | Performed by: NURSE PRACTITIONER

## 2025-01-28 PROCEDURE — 3430000001 HC RX 343 DIAGNOSTIC RADIOPHARMACEUTICALS: Performed by: INTERNAL MEDICINE

## 2025-01-28 PROCEDURE — 85027 COMPLETE CBC AUTOMATED: CPT | Performed by: NURSE PRACTITIONER

## 2025-01-28 PROCEDURE — 2500000004 HC RX 250 GENERAL PHARMACY W/ HCPCS (ALT 636 FOR OP/ED): Performed by: NURSE PRACTITIONER

## 2025-01-28 PROCEDURE — 85007 BL SMEAR W/DIFF WBC COUNT: CPT | Performed by: NURSE PRACTITIONER

## 2025-01-28 PROCEDURE — 2500000004 HC RX 250 GENERAL PHARMACY W/ HCPCS (ALT 636 FOR OP/ED): Mod: JZ | Performed by: PHYSICIAN ASSISTANT

## 2025-01-28 PROCEDURE — 2500000001 HC RX 250 WO HCPCS SELF ADMINISTERED DRUGS (ALT 637 FOR MEDICARE OP): Performed by: NURSE PRACTITIONER

## 2025-01-28 PROCEDURE — RXMED WILLOW AMBULATORY MEDICATION CHARGE

## 2025-01-28 RX ORDER — OXYCODONE HYDROCHLORIDE 20 MG/1
20 TABLET ORAL EVERY 8 HOURS PRN
Qty: 20 TABLET | Refills: 0 | Status: SHIPPED | OUTPATIENT
Start: 2025-01-28

## 2025-01-28 RX ORDER — INDIUM IN-111 OXYQUINOLINE 1 UG/ML
614 SOLUTION INTRAVENOUS
Status: COMPLETED | OUTPATIENT
Start: 2025-01-27 | End: 2025-01-27

## 2025-01-28 RX ORDER — FOLIC ACID 1 MG/1
1 TABLET ORAL DAILY
Qty: 30 TABLET | Refills: 0 | Status: SHIPPED | OUTPATIENT
Start: 2025-01-28 | End: 2025-02-27

## 2025-01-28 RX ADMIN — ONDANSETRON HYDROCHLORIDE 8 MG: 8 TABLET, FILM COATED ORAL at 11:04

## 2025-01-28 RX ADMIN — FOLIC ACID 1 MG: 1 TABLET ORAL at 11:04

## 2025-01-28 RX ADMIN — SENNOSIDES AND DOCUSATE SODIUM 2 TABLET: 50; 8.6 TABLET ORAL at 11:04

## 2025-01-28 RX ADMIN — HYDROMORPHONE HYDROCHLORIDE 3 MG: 1 INJECTION, SOLUTION INTRAMUSCULAR; INTRAVENOUS; SUBCUTANEOUS at 06:27

## 2025-01-28 RX ADMIN — LIDOCAINE 2 PATCH: 560 PATCH PERCUTANEOUS; TOPICAL; TRANSDERMAL at 11:06

## 2025-01-28 RX ADMIN — LORAZEPAM 0.5 MG: 0.5 TABLET ORAL at 07:27

## 2025-01-28 RX ADMIN — HYDROMORPHONE HYDROCHLORIDE 3 MG: 1 INJECTION, SOLUTION INTRAMUSCULAR; INTRAVENOUS; SUBCUTANEOUS at 11:01

## 2025-01-28 RX ADMIN — HYDROMORPHONE HYDROCHLORIDE 3 MG: 1 INJECTION, SOLUTION INTRAMUSCULAR; INTRAVENOUS; SUBCUTANEOUS at 03:11

## 2025-01-28 RX ADMIN — HYDROMORPHONE HYDROCHLORIDE 3 MG: 1 INJECTION, SOLUTION INTRAMUSCULAR; INTRAVENOUS; SUBCUTANEOUS at 00:41

## 2025-01-28 RX ADMIN — TECHNETIUM TC 99M SULFUR COLLOID 10.7 MILLICURIE: KIT at 09:10

## 2025-01-28 RX ADMIN — PANTOPRAZOLE SODIUM 40 MG: 40 TABLET, DELAYED RELEASE ORAL at 06:27

## 2025-01-28 ASSESSMENT — COGNITIVE AND FUNCTIONAL STATUS - GENERAL
DAILY ACTIVITIY SCORE: 24
MOBILITY SCORE: 24

## 2025-01-28 ASSESSMENT — PAIN DESCRIPTION - ORIENTATION
ORIENTATION: LEFT
ORIENTATION: LEFT

## 2025-01-28 ASSESSMENT — PAIN SCALES - GENERAL
PAINLEVEL_OUTOF10: 5 - MODERATE PAIN
PAINLEVEL_OUTOF10: 7
PAINLEVEL_OUTOF10: 8
PAINLEVEL_OUTOF10: 8
PAINLEVEL_OUTOF10: 7

## 2025-01-28 ASSESSMENT — PAIN DESCRIPTION - LOCATION
LOCATION: SHOULDER
LOCATION: SHOULDER

## 2025-01-28 ASSESSMENT — PAIN - FUNCTIONAL ASSESSMENT
PAIN_FUNCTIONAL_ASSESSMENT: 0-10

## 2025-01-28 NOTE — DISCHARGE SUMMARY
Discharge Diagnosis  Sickle cell pain crisis (Multi)    Issues Requiring Follow-Up  Sickle Cell     Test Results Pending At Discharge  Pending Labs       No current pending labs.            Hospital Course   Joellen Narayan is a 24 y.o. 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 Hgb and lysis labs at baseline. CXR (1/19) without acute process. Admitted for further management on pain. Started on IV dilaudid per care plan and transitioned to dPCA pump on 1/21 for further pain control. Xray right leg and left shoulder unremarkable, no AVN (1/21). Bilirubin and lysis labs worsening (1/21), CRP elevated. MRI left shoulder and right tib/fib (1/23) concerning for osteomyelitis vs sequelae of sickle cell disease. HDS, afebrile since 1/21. Blood cultures x2 - negative (1/25). On 1/23, patient transitioned off dPCA and restarted on Q2hr pain medication. Ortho consulted, no acute interventions, recommended ID consult. ID consulted (1/23), okay to monitor off antibiotics and recommended a nuclear bone scan. If patient were to become HD unstable or febrile, re-culture and start broad spectrum antibiotics. Nuclear bone scan completed on 1/28 and it showed no signs of active infection, including within the areas of interest of his the left humerus and right leg. Pain well controlled and hemoglobin / lysis labs stable on day of discharge. Patient appropriate for discharge 1/28.  DC pending improvement in pain and osteomyelitis workup.     Refills of patient's home folic acid and oxycodone 20mg sent to [facility] pharmacy, patient  requests that he  refills. He has FUV with pulm on 2/3, PET 3/4, oncology 3/13, and sickle cell on 4/9.      Pertinent Physical Exam At Time of Discharge  Physical Exam  Constitutional:       Appearance: Normal appearance.   HENT:      Mouth/Throat:      Mouth: Mucous membranes are moist.   Eyes:      General: Scleral icterus present.      Pupils: Pupils are equal, round, and reactive to light.   Cardiovascular:      Rate and Rhythm: Normal rate and regular rhythm.      Pulses: Normal pulses.      Heart sounds: Normal heart sounds.   Pulmonary:      Effort: Pulmonary effort is normal.      Breath sounds: Normal breath sounds.   Abdominal:      General: Abdomen is flat. Bowel sounds are normal.      Palpations: Abdomen is soft.   Musculoskeletal:         General: Normal range of motion.      Cervical back: Normal range of motion and neck supple.   Skin:     General: Skin is warm.   Neurological:      General: No focal deficit present.      Mental Status: He is alert.   Psychiatric:         Mood and Affect: Mood normal.         Home Medications     Medication List      CONTINUE taking these medications     baclofen 5 mg tablet; Commonly known as: Lioresal; Take 1 tablet (5 mg)   by mouth 3 times a day.   * chlorhexidine 4 % external liquid; Commonly known as: Hibiclens; Apply   topically once daily as needed for wound care. Use for 5 days prior to   surgery as body wash, do not use on face or genital region   folic acid 1 mg tablet; Commonly known as: Folvite; Take 1 tablet (1 mg)   by mouth once daily.   pseudoephedrine 60 mg tablet; Commonly known as: Sudogest; Take 1 tablet   (60 mg) by mouth every 8 hours if needed for congestion for up to 10 days.  * This list has 1 medication(s) that are the same as other medications   prescribed for you. Read the directions carefully, and ask your doctor or   other care provider to review them with you.     ASK your doctor about these medications     * chlorhexidine 0.12  % solution; Commonly known as: Peridex; Use 15 mL   in the mouth or throat if needed for wound care for up to 2 days. Use as   mouth wash for night before and morning of surgery; Ask about: Should I   take this medication?   oxyCODONE 20 mg immediate release tablet; Commonly known as: Roxicodone;   Take 1 tablet (20 mg) by mouth every 8 hours if needed for severe pain (7   - 10).; Ask about: Which instructions should I use?  * This list has 1 medication(s) that are the same as other medications   prescribed for you. Read the directions carefully, and ask your doctor or   other care provider to review them with you.       Outpatient Follow-Up  Future Appointments   Date Time Provider Department Center   1/28/2025 12:20 PM [facility] NM 1 C[facility]NM [facility] Rad [facility]   2/13/2025 10:30 AM Radha Garcia MD [id number] Academic   3/4/2025  9:00 AM [facility] ADMIN ROOM PET CT 1 CMCS[facility]   3/4/2025 10:00 AM [facility] PET CT 2 [facility]   3/13/2025  4:00 PM Melissa Stoll MD [id number] Academic   4/9/2025 11:00 AM RAAD Cannon-CNP [id number] Academic     I spent > 60 minute in the discharge of this patient     Discharge discussed with attending physician, Dr. Soheila Stoll PA-C

## 2025-01-28 NOTE — CARE PLAN
The patient's goals for the shift include Rest and Comfort    The clinical goals for the shift include pt will remain HDS and VSS through end of shift 1/28/25 @ 0700      Problem: Pain - Adult  Goal: Verbalizes/displays adequate comfort level or baseline comfort level  Outcome: Progressing     Problem: Safety - Adult  Goal: Free from fall injury  Outcome: Progressing     Problem: Discharge Planning  Goal: Discharge to home or other facility with appropriate resources  Outcome: Progressing     Problem: Chronic Conditions and Co-morbidities  Goal: Patient's chronic conditions and co-morbidity symptoms are monitored and maintained or improved  Outcome: Progressing     Problem: Pain  Goal: Takes deep breaths with improved pain control throughout the shift  Outcome: Progressing  Goal: Turns in bed with improved pain control throughout the shift  Outcome: Progressing

## 2025-01-28 NOTE — CARE PLAN
Patient afebrile. Vitals stable. Pain improving. Scans clean negative for om. Will discharge home

## 2025-02-13 ENCOUNTER — APPOINTMENT (OUTPATIENT)
Dept: SURGERY | Facility: CLINIC | Age: 25
End: 2025-02-13
Payer: COMMERCIAL

## 2025-02-22 ENCOUNTER — HOSPITAL ENCOUNTER (EMERGENCY)
Facility: HOSPITAL | Age: 25
Discharge: HOME | End: 2025-02-22
Attending: STUDENT IN AN ORGANIZED HEALTH CARE EDUCATION/TRAINING PROGRAM
Payer: COMMERCIAL

## 2025-02-22 VITALS
RESPIRATION RATE: 18 BRPM | TEMPERATURE: 98.6 F | DIASTOLIC BLOOD PRESSURE: 66 MMHG | SYSTOLIC BLOOD PRESSURE: 135 MMHG | OXYGEN SATURATION: 99 % | HEART RATE: 86 BPM

## 2025-02-22 DIAGNOSIS — L02.91 ABSCESS: Primary | ICD-10-CM

## 2025-02-22 PROCEDURE — 2500000002 HC RX 250 W HCPCS SELF ADMINISTERED DRUGS (ALT 637 FOR MEDICARE OP, ALT 636 FOR OP/ED)

## 2025-02-22 PROCEDURE — 99284 EMERGENCY DEPT VISIT MOD MDM: CPT

## 2025-02-22 PROCEDURE — 99283 EMERGENCY DEPT VISIT LOW MDM: CPT | Mod: 25 | Performed by: STUDENT IN AN ORGANIZED HEALTH CARE EDUCATION/TRAINING PROGRAM

## 2025-02-22 PROCEDURE — 10060 I&D ABSCESS SIMPLE/SINGLE: CPT

## 2025-02-22 PROCEDURE — 2500000001 HC RX 250 WO HCPCS SELF ADMINISTERED DRUGS (ALT 637 FOR MEDICARE OP)

## 2025-02-22 RX ORDER — OXYCODONE HYDROCHLORIDE 5 MG/1
5 TABLET ORAL ONCE
Status: COMPLETED | OUTPATIENT
Start: 2025-02-22 | End: 2025-02-22

## 2025-02-22 RX ORDER — SULFAMETHOXAZOLE AND TRIMETHOPRIM 800; 160 MG/1; MG/1
1 TABLET ORAL 2 TIMES DAILY
Qty: 10 TABLET | Refills: 0 | Status: SHIPPED | OUTPATIENT
Start: 2025-02-22 | End: 2025-02-27

## 2025-02-22 RX ORDER — SULFAMETHOXAZOLE AND TRIMETHOPRIM 800; 160 MG/1; MG/1
1 TABLET ORAL ONCE
Status: COMPLETED | OUTPATIENT
Start: 2025-02-22 | End: 2025-02-22

## 2025-02-22 RX ADMIN — SULFAMETHOXAZOLE AND TRIMETHOPRIM 1 TABLET: 800; 160 TABLET ORAL at 11:33

## 2025-02-22 RX ADMIN — OXYCODONE 5 MG: 5 TABLET ORAL at 11:33

## 2025-02-22 ASSESSMENT — PAIN SCALES - GENERAL: PAINLEVEL_OUTOF10: 8

## 2025-02-22 NOTE — ED PROCEDURE NOTE
Procedure  Incision and Drainage    Performed by: Jenny Gonzalez PA-C  Authorized by: Sofia Whiting MD    Consent:     Consent obtained:  Verbal    Consent given by:  Patient  Universal protocol:     Patient identity confirmed:  Verbally with patient  Location:     Type:  Abscess    Location:  Lower extremity    Lower extremity location:  Leg    Leg location:  L lower leg  Pre-procedure details:     Skin preparation:  Chlorhexidine with alcohol  Procedure type:     Complexity:  Simple  Procedure details:     Incision type: spontaneously drained right after injection of lidocaine.    Drainage:  Bloody and purulent    Drainage amount:  Moderate    Packing materials:  None  Post-procedure details:     Procedure completion:  Tolerated well, no immediate complications               Jenny Gonzalez PA-C  02/22/25 1121
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
## 2025-02-22 NOTE — DISCHARGE INSTRUCTIONS
We were able to drain blood and purulence from the abscess to your left lateral leg.  Please be sure to keep this area very clean and very dry.  You can take Tylenol/Advil as needed for pain.  You also received an antibiotic here in the ED to prevent infection.  This is called Bactrim.  You are to take this twice daily for the next 5 days.  It is very important to take the full course of this antibiotic.  If you begin to experience fevers, chills, flulike symptoms or worsening pain, swelling, or redness from the abscess site please return for further evaluation.

## 2025-02-27 ENCOUNTER — TELEPHONE (OUTPATIENT)
Dept: HEMATOLOGY/ONCOLOGY | Facility: HOSPITAL | Age: 25
End: 2025-02-27

## 2025-02-27 ENCOUNTER — APPOINTMENT (OUTPATIENT)
Dept: SURGERY | Facility: CLINIC | Age: 25
End: 2025-02-27
Payer: COMMERCIAL

## 2025-02-27 NOTE — TELEPHONE ENCOUNTER
"Spoke to Joellen.    He was seen in the ED on 2/22/25 with a L leg abscess. Abscess was drained and he was placed on a 5 day course of Bactrim DS. Joellen states he doesn't think the antibiotic is working as he has a \"small hole\" in his leg now with new leg swelling and a small amount of drainage. Endorses pain localized to the area. Denies fever, chills, CP, SOB, n/v. Advised he needs to return to the ED for reevaluation. He does not have a PCP and [facility] acute care clinic is not adequate for this situation. Patient verbalized understanding and will go to the ED today.  "

## 2025-02-28 ENCOUNTER — HOSPITAL ENCOUNTER (OUTPATIENT)
Facility: HOSPITAL | Age: 25
Setting detail: OBSERVATION
Discharge: HOME | End: 2025-03-01
Attending: EMERGENCY MEDICINE | Admitting: EMERGENCY MEDICINE
Payer: COMMERCIAL

## 2025-02-28 DIAGNOSIS — S81.802A WOUND OF LEFT LOWER EXTREMITY, INITIAL ENCOUNTER: Primary | ICD-10-CM

## 2025-02-28 LAB
ALBUMIN SERPL BCP-MCNC: 4.9 G/DL (ref 3.4–5)
ALP SERPL-CCNC: 141 U/L (ref 33–120)
ALT SERPL W P-5'-P-CCNC: 26 U/L (ref 10–52)
ANION GAP SERPL CALC-SCNC: 15 MMOL/L (ref 10–20)
AST SERPL W P-5'-P-CCNC: 37 U/L (ref 9–39)
BASO STIPL BLD QL SMEAR: PRESENT
BASOPHILS # BLD AUTO: 0.03 X10*3/UL (ref 0–0.1)
BASOPHILS NFR BLD AUTO: 0.3 %
BILIRUB SERPL-MCNC: 6.8 MG/DL (ref 0–1.2)
BUN SERPL-MCNC: 6 MG/DL (ref 6–23)
CALCIUM SERPL-MCNC: 9.8 MG/DL (ref 8.6–10.6)
CHLORIDE SERPL-SCNC: 106 MMOL/L (ref 98–107)
CO2 SERPL-SCNC: 24 MMOL/L (ref 21–32)
CREAT SERPL-MCNC: 0.67 MG/DL (ref 0.5–1.3)
EGFRCR SERPLBLD CKD-EPI 2021: >90 ML/MIN/1.73M*2
EOSINOPHIL # BLD AUTO: 0.24 X10*3/UL (ref 0–0.7)
EOSINOPHIL NFR BLD AUTO: 2.3 %
ERYTHROCYTE [DISTWIDTH] IN BLOOD BY AUTOMATED COUNT: 23.1 % (ref 11.5–14.5)
GLUCOSE SERPL-MCNC: 78 MG/DL (ref 74–99)
HCT VFR BLD AUTO: 26.6 % (ref 41–52)
HGB BLD-MCNC: 10 G/DL (ref 13.5–17.5)
HYPOCHROMIA BLD QL SMEAR: NORMAL
IMM GRANULOCYTES # BLD AUTO: 0.06 X10*3/UL (ref 0–0.7)
IMM GRANULOCYTES NFR BLD AUTO: 0.6 % (ref 0–0.9)
LYMPHOCYTES # BLD AUTO: 2.47 X10*3/UL (ref 1.2–4.8)
LYMPHOCYTES NFR BLD AUTO: 23.2 %
MCH RBC QN AUTO: 31.4 PG (ref 26–34)
MCHC RBC AUTO-ENTMCNC: 37.6 G/DL (ref 32–36)
MCV RBC AUTO: 84 FL (ref 80–100)
MONOCYTES # BLD AUTO: 0.88 X10*3/UL (ref 0.1–1)
MONOCYTES NFR BLD AUTO: 8.3 %
NEUTROPHILS # BLD AUTO: 6.96 X10*3/UL (ref 1.2–7.7)
NEUTROPHILS NFR BLD AUTO: 65.3 %
NRBC BLD-RTO: 3.5 /100 WBCS (ref 0–0)
PLATELET # BLD AUTO: 274 X10*3/UL (ref 150–450)
POLYCHROMASIA BLD QL SMEAR: NORMAL
POTASSIUM SERPL-SCNC: 4.5 MMOL/L (ref 3.5–5.3)
PROT SERPL-MCNC: 7.2 G/DL (ref 6.4–8.2)
RBC # BLD AUTO: 3.18 X10*6/UL (ref 4.5–5.9)
RBC MORPH BLD: NORMAL
SCHISTOCYTES BLD QL SMEAR: NORMAL
SICKLE CELLS BLD QL SMEAR: NORMAL
SODIUM SERPL-SCNC: 140 MMOL/L (ref 136–145)
TARGETS BLD QL SMEAR: NORMAL
WBC # BLD AUTO: 10.6 X10*3/UL (ref 4.4–11.3)

## 2025-02-28 PROCEDURE — 99223 1ST HOSP IP/OBS HIGH 75: CPT

## 2025-02-28 PROCEDURE — 99285 EMERGENCY DEPT VISIT HI MDM: CPT | Mod: 25 | Performed by: EMERGENCY MEDICINE

## 2025-02-28 PROCEDURE — 2500000001 HC RX 250 WO HCPCS SELF ADMINISTERED DRUGS (ALT 637 FOR MEDICARE OP): Performed by: PHYSICIAN ASSISTANT

## 2025-02-28 PROCEDURE — 96365 THER/PROPH/DIAG IV INF INIT: CPT

## 2025-02-28 PROCEDURE — 2500000004 HC RX 250 GENERAL PHARMACY W/ HCPCS (ALT 636 FOR OP/ED): Performed by: PHYSICIAN ASSISTANT

## 2025-02-28 PROCEDURE — 2500000004 HC RX 250 GENERAL PHARMACY W/ HCPCS (ALT 636 FOR OP/ED)

## 2025-02-28 PROCEDURE — 85025 COMPLETE CBC W/AUTO DIFF WBC: CPT

## 2025-02-28 PROCEDURE — 36415 COLL VENOUS BLD VENIPUNCTURE: CPT

## 2025-02-28 PROCEDURE — G0378 HOSPITAL OBSERVATION PER HR: HCPCS

## 2025-02-28 PROCEDURE — 96375 TX/PRO/DX INJ NEW DRUG ADDON: CPT

## 2025-02-28 PROCEDURE — 80053 COMPREHEN METABOLIC PANEL: CPT

## 2025-02-28 RX ORDER — OXYCODONE HYDROCHLORIDE 5 MG/1
5 TABLET ORAL EVERY 6 HOURS PRN
Status: DISCONTINUED | OUTPATIENT
Start: 2025-02-28 | End: 2025-03-01 | Stop reason: HOSPADM

## 2025-02-28 RX ORDER — KETOROLAC TROMETHAMINE 15 MG/ML
15 INJECTION, SOLUTION INTRAMUSCULAR; INTRAVENOUS EVERY 8 HOURS PRN
Status: DISCONTINUED | OUTPATIENT
Start: 2025-02-28 | End: 2025-03-01 | Stop reason: HOSPADM

## 2025-02-28 RX ORDER — CLINDAMYCIN PHOSPHATE 600 MG/50ML
600 INJECTION, SOLUTION INTRAVENOUS EVERY 8 HOURS
Status: DISCONTINUED | OUTPATIENT
Start: 2025-02-28 | End: 2025-03-01 | Stop reason: HOSPADM

## 2025-02-28 RX ORDER — MORPHINE SULFATE 4 MG/ML
4 INJECTION INTRAVENOUS ONCE
Status: COMPLETED | OUTPATIENT
Start: 2025-02-28 | End: 2025-02-28

## 2025-02-28 RX ORDER — CLINDAMYCIN PHOSPHATE 600 MG/50ML
600 INJECTION, SOLUTION INTRAVENOUS ONCE
Status: COMPLETED | OUTPATIENT
Start: 2025-02-28 | End: 2025-02-28

## 2025-02-28 RX ADMIN — KETOROLAC TROMETHAMINE 15 MG: 15 INJECTION, SOLUTION INTRAMUSCULAR; INTRAVENOUS at 20:44

## 2025-02-28 RX ADMIN — CLINDAMYCIN IN 5 PERCENT DEXTROSE 600 MG: 12 INJECTION, SOLUTION INTRAVENOUS at 22:17

## 2025-02-28 RX ADMIN — MORPHINE SULFATE 4 MG: 4 INJECTION INTRAVENOUS at 13:31

## 2025-02-28 RX ADMIN — OXYCODONE 5 MG: 5 TABLET ORAL at 17:00

## 2025-02-28 RX ADMIN — CLINDAMYCIN PHOSPHATE 600 MG: 600 INJECTION, SOLUTION INTRAVENOUS at 13:31

## 2025-02-28 ASSESSMENT — PAIN DESCRIPTION - PAIN TYPE: TYPE: ACUTE PAIN

## 2025-02-28 ASSESSMENT — PAIN SCALES - GENERAL
PAINLEVEL_OUTOF10: 8
PAINLEVEL_OUTOF10: 9
PAINLEVEL_OUTOF10: 8
PAINLEVEL_OUTOF10: 9
PAINLEVEL_OUTOF10: 8
PAINLEVEL_OUTOF10: 9

## 2025-02-28 ASSESSMENT — PAIN DESCRIPTION - LOCATION
LOCATION: LEG
LOCATION: LEG

## 2025-02-28 ASSESSMENT — PAIN SCALES - WONG BAKER: WONGBAKER_NUMERICALRESPONSE: HURTS WHOLE LOT

## 2025-02-28 ASSESSMENT — PAIN DESCRIPTION - ORIENTATION
ORIENTATION: LEFT
ORIENTATION: LEFT

## 2025-02-28 ASSESSMENT — PAIN - FUNCTIONAL ASSESSMENT
PAIN_FUNCTIONAL_ASSESSMENT: 0-10
PAIN_FUNCTIONAL_ASSESSMENT: 0-10

## 2025-02-28 ASSESSMENT — PAIN DESCRIPTION - DESCRIPTORS: DESCRIPTORS: DISCOMFORT

## 2025-02-28 NOTE — PROGRESS NOTES
Pharmacy Medication History Review    Joellen Narayan is a 24 y.o. male admitted for Leg wound, left, initial encounter. Pharmacy reviewed the patient's [id number] medications and allergies for accuracy.    Medications ADDED:  N/A  Medications CHANGED:  N/A  Medications REMOVED/NOT TAKING:   Baclofen  Hibiclens   Sudogest   Bactrim     The list below reflects the updated PTA list.   Prior to Admission Medications   Prescriptions Last Dose Informant   baclofen (Lioresal) 5 mg tablet Not Taking Self   Sig: Take 1 tablet (5 mg) by mouth 3 times a day.   Patient not taking: Reported on 2/28/2025   chlorhexidine (Hibiclens) 4 % external liquid Not Taking Self   Sig: Apply topically once daily as needed for wound care. Use for 5 days prior to surgery as body wash, do not use on face or genital region   Patient not taking: Reported on 2/28/2025   folic acid (Folvite) 1 mg tablet 2/28/2025 Morning Self   Sig: Take 1 tablet (1 mg) by mouth once daily.   oxyCODONE (Roxicodone) 20 mg immediate release tablet 2/26/2025 Morning Self   Sig: Take 1 tablet (20 mg) by mouth every 8 hours if needed for severe pain (7 - 10).   pseudoephedrine (Sudogest) 60 mg tablet Not Taking    Sig: Take 1 tablet (60 mg) by mouth every 8 hours if needed for congestion for up to 10 days.   Patient not taking: Reported on 2/28/2025   sulfamethoxazole-trimethoprim (Bactrim DS) 800-160 mg tablet Not Taking    Sig: Take 1 tablet by mouth 2 times a day for 5 days.   Patient not taking: Reported on 2/28/2025      Facility-Administered Medications: None        The list below reflects the updated allergy list. Please review each documented allergy for additional clarification and justification.  Allergies  Reviewed by Breanna Phoenix on 2/28/2025        Severity Reactions Comments    Cefepime Medium Hallucinations     Amoxicillin Low Hives     Ceftriaxone Low Hives, Rash             Patient accepts M2B at discharge.     Sources:   [facility]  Pharmacy  "dispense history  Patient interview Good historian  Chart Review     Additional Comments:  Pt was able to confirm his meds, dosages, and last taken at home.       MONIE HAMMER [place]  Pharmacy Technician  02/28/25     Secure Chat preferred   If no response call [phone] or [employer] \"Med Rec\"   "
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
## 2025-03-01 ENCOUNTER — PHARMACY VISIT (OUTPATIENT)
Dept: PHARMACY | Facility: CLINIC | Age: 25
End: 2025-03-01
Payer: MEDICARE

## 2025-03-01 VITALS
SYSTOLIC BLOOD PRESSURE: 114 MMHG | WEIGHT: 155 LBS | HEART RATE: 58 BPM | TEMPERATURE: 97.9 F | BODY MASS INDEX: 19.27 KG/M2 | DIASTOLIC BLOOD PRESSURE: 60 MMHG | RESPIRATION RATE: 20 BRPM | HEIGHT: 75 IN | OXYGEN SATURATION: 100 %

## 2025-03-01 LAB
ANION GAP SERPL CALC-SCNC: 13 MMOL/L (ref 10–20)
BUN SERPL-MCNC: 5 MG/DL (ref 6–23)
CALCIUM SERPL-MCNC: 9.1 MG/DL (ref 8.6–10.6)
CHLORIDE SERPL-SCNC: 107 MMOL/L (ref 98–107)
CO2 SERPL-SCNC: 22 MMOL/L (ref 21–32)
CREAT SERPL-MCNC: 0.6 MG/DL (ref 0.5–1.3)
EGFRCR SERPLBLD CKD-EPI 2021: >90 ML/MIN/1.73M*2
ERYTHROCYTE [DISTWIDTH] IN BLOOD BY AUTOMATED COUNT: 23.7 % (ref 11.5–14.5)
GLUCOSE SERPL-MCNC: 83 MG/DL (ref 74–99)
HCT VFR BLD AUTO: 25.3 % (ref 41–52)
HGB BLD-MCNC: 9.2 G/DL (ref 13.5–17.5)
MAGNESIUM SERPL-MCNC: 2.24 MG/DL (ref 1.6–2.4)
MCH RBC QN AUTO: 30.8 PG (ref 26–34)
MCHC RBC AUTO-ENTMCNC: 36.4 G/DL (ref 32–36)
MCV RBC AUTO: 85 FL (ref 80–100)
NRBC BLD-RTO: 3.6 /100 WBCS (ref 0–0)
PLATELET # BLD AUTO: 287 X10*3/UL (ref 150–450)
POTASSIUM SERPL-SCNC: 4.1 MMOL/L (ref 3.5–5.3)
RBC # BLD AUTO: 2.99 X10*6/UL (ref 4.5–5.9)
SODIUM SERPL-SCNC: 138 MMOL/L (ref 136–145)
WBC # BLD AUTO: 9.6 X10*3/UL (ref 4.4–11.3)

## 2025-03-01 PROCEDURE — 83735 ASSAY OF MAGNESIUM: CPT

## 2025-03-01 PROCEDURE — 36415 COLL VENOUS BLD VENIPUNCTURE: CPT

## 2025-03-01 PROCEDURE — 85027 COMPLETE CBC AUTOMATED: CPT

## 2025-03-01 PROCEDURE — 2500000004 HC RX 250 GENERAL PHARMACY W/ HCPCS (ALT 636 FOR OP/ED): Performed by: PHYSICIAN ASSISTANT

## 2025-03-01 PROCEDURE — G0378 HOSPITAL OBSERVATION PER HR: HCPCS

## 2025-03-01 PROCEDURE — 96366 THER/PROPH/DIAG IV INF ADDON: CPT

## 2025-03-01 PROCEDURE — 2500000001 HC RX 250 WO HCPCS SELF ADMINISTERED DRUGS (ALT 637 FOR MEDICARE OP): Performed by: PHYSICIAN ASSISTANT

## 2025-03-01 PROCEDURE — 80048 BASIC METABOLIC PNL TOTAL CA: CPT

## 2025-03-01 PROCEDURE — RXMED WILLOW AMBULATORY MEDICATION CHARGE

## 2025-03-01 PROCEDURE — 2500000001 HC RX 250 WO HCPCS SELF ADMINISTERED DRUGS (ALT 637 FOR MEDICARE OP)

## 2025-03-01 RX ORDER — OXYCODONE HYDROCHLORIDE 5 MG/1
20 TABLET ORAL ONCE
Status: COMPLETED | OUTPATIENT
Start: 2025-03-01 | End: 2025-03-01

## 2025-03-01 RX ORDER — CLINDAMYCIN HYDROCHLORIDE 150 MG/1
450 CAPSULE ORAL 3 TIMES DAILY
Qty: 63 CAPSULE | Refills: 0 | Status: SHIPPED | OUTPATIENT
Start: 2025-03-01 | End: 2025-03-08

## 2025-03-01 RX ADMIN — CLINDAMYCIN IN 5 PERCENT DEXTROSE 600 MG: 12 INJECTION, SOLUTION INTRAVENOUS at 06:27

## 2025-03-01 RX ADMIN — OXYCODONE 5 MG: 5 TABLET ORAL at 00:18

## 2025-03-01 RX ADMIN — OXYCODONE 20 MG: 5 TABLET ORAL at 04:55

## 2025-03-01 ASSESSMENT — PAIN SCALES - GENERAL
PAINLEVEL_OUTOF10: 10 - WORST POSSIBLE PAIN
PAINLEVEL_OUTOF10: 10 - WORST POSSIBLE PAIN

## 2025-03-01 ASSESSMENT — PAIN DESCRIPTION - LOCATION: LOCATION: LEG

## 2025-03-01 ASSESSMENT — PAIN DESCRIPTION - ORIENTATION: ORIENTATION: LEFT

## 2025-03-01 ASSESSMENT — PAIN DESCRIPTION - PAIN TYPE: TYPE: ACUTE PAIN

## 2025-03-01 ASSESSMENT — PAIN - FUNCTIONAL ASSESSMENT: PAIN_FUNCTIONAL_ASSESSMENT: 0-10
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
## 2025-03-01 NOTE — DISCHARGE INSTRUCTIONS
Your wound was treated with IV antibiotics.  You should continue taking oral clindamycin when you get home.  Your medication was brought to you before discharge.  Take clindamycin 3 times a day.  3 tablets each time.  Your wound was dressed with Xeroform.  You should continue this dressing at home.  Monitoring for signs of infection like increasing redness, enlarging wound, increasing drainage.  Follow-up with your primary care provider.  If you do not have one, use the number below to call and make an appointment.  Please do not hesitate to return to the ED if symptoms change or worsen.

## 2025-03-01 NOTE — PROGRESS NOTES
"Progress Note  Clinical Decision Unit  Patient: Joellen Narayan  MRN: [id number]  : 2000    Subjective  Joellen Narayan has been admitted to the CDU for 17 hours. Serial assessments of Joellen Narayan's clinical progress include:  1) vital signs remained stable  2) continued drainage from wound on left lower leg, endorsing pain  -- pain meds and wound cleaned with sterile water at 8:45PM  3) 4:15 AM; states that Toradol does nothing for him and the Oxy barely touched his pain.  I did review patient's sickle cell treatment plan with attending, ordered 20 immediate release oxycodone   4) wound care nurse consult placed; patient likely to be assessed in the morning    Objective  Last Recorded Vitals  Blood pressure 120/74, pulse 72, temperature 36.5 °C (97.7 °F), resp. rate 18, height 1.905 m (6' 3\"), weight 70.3 kg (155 lb), SpO2 95%.  Physical Exam  Physical Exam    Appearance: Alert, oriented , cooperative,  in no acute distress. Well nourished & well hydrated.  Skin: About 1 to 2 cm circular open wound to the lateral aspect of left lower leg.  Purulent drainage noted.  Area significantly tender to palpation.  No surrounding erythema or sloughing of skin.  No swelling noted.  Eyes: EOMs intact,  Conjunctiva pink with no redness or exudates.No scleral icterus.   ENT: Hearing grossly intact. Pharynx clear, uvula midline.   Neck: Supple. No lymphadenopathy.  Pulmonary: Clear bilaterally with good chest wall excursion. No rales, rhonchi or wheezing. No accessory muscle use or stridor.  Cardiac: Normal S1, S2 without murmur, rub, gallop or extrasystole.  Abdomen: Soft, nontender, active bowel sounds.  No palpable organomegaly.  No rebound or guarding.    Musculoskeletal: Full range of motion. Pulses full and equal. No cyanosis, clubbing, or edema.  Neurological:  Cranial nerves II through XII are grossly intact, normal sensation, no weakness, no focal findings identified.  Psychiatric: Appropriate mood and affect. "     Relevant Results  No results found.  Results for orders placed or performed during the hospital encounter of 02/28/25 (from the past 24 hours)   CBC and Auto Differential   Result Value Ref Range    WBC 10.6 4.4 - 11.3 x10*3/uL    nRBC 3.5 (H) 0.0 - 0.0 /100 WBCs    RBC 3.18 (L) 4.50 - 5.90 x10*6/uL    Hemoglobin 10.0 (L) 13.5 - 17.5 g/dL    Hematocrit 26.6 (L) 41.0 - 52.0 %    MCV 84 80 - 100 fL    MCH 31.4 26.0 - 34.0 pg    MCHC 37.6 (H) 32.0 - 36.0 g/dL    RDW 23.1 (H) 11.5 - 14.5 %    Platelets 274 150 - 450 x10*3/uL    Neutrophils % 65.3 40.0 - 80.0 %    Immature Granulocytes %, Automated 0.6 0.0 - 0.9 %    Lymphocytes % 23.2 13.0 - 44.0 %    Monocytes % 8.3 2.0 - 10.0 %    Eosinophils % 2.3 0.0 - 6.0 %    Basophils % 0.3 0.0 - 2.0 %    Neutrophils Absolute 6.96 1.20 - 7.70 x10*3/uL    Immature Granulocytes Absolute, Automated 0.06 0.00 - 0.70 x10*3/uL    Lymphocytes Absolute 2.47 1.20 - 4.80 x10*3/uL    Monocytes Absolute 0.88 0.10 - 1.00 x10*3/uL    Eosinophils Absolute 0.24 0.00 - 0.70 x10*3/uL    Basophils Absolute 0.03 0.00 - 0.10 x10*3/uL   Comprehensive metabolic panel   Result Value Ref Range    Glucose 78 74 - 99 mg/dL    Sodium 140 136 - 145 mmol/L    Potassium 4.5 3.5 - 5.3 mmol/L    Chloride 106 98 - 107 mmol/L    Bicarbonate 24 21 - 32 mmol/L    Anion Gap 15 10 - 20 mmol/L    Urea Nitrogen 6 6 - 23 mg/dL    Creatinine 0.67 0.50 - 1.30 mg/dL    eGFR >90 >60 mL/min/1.73m*2    Calcium 9.8 8.6 - 10.6 mg/dL    Albumin 4.9 3.4 - 5.0 g/dL    Alkaline Phosphatase 141 (H) 33 - 120 U/L    Total Protein 7.2 6.4 - 8.2 g/dL    AST 37 9 - 39 U/L    Bilirubin, Total 6.8 (H) 0.0 - 1.2 mg/dL    ALT 26 10 - 52 U/L   Morphology   Result Value Ref Range    RBC Morphology See Below     Polychromasia Mild     Hypochromia Mild     RBC Fragments Few     Sickle Cells Many     Target Cells Few     Basophilic Stippling Present      *Note: Due to a large number of results and/or encounters for the requested time period,  some results have not been displayed. A complete set of results can be found in Results Review.       Scheduled medications  clindamycin, 600 mg, intravenous, q8h      Continuous medications     PRN medications  PRN medications: ketorolac, oxyCODONE    Assessment & Plan  Wound of left lower extremity, initial encounter      Assessment/Plan  Joellen Narayan continues to be managed in accordance with the CDU clinical guidelines for Leg wound, left, initial encounter. An update of their clinical problem list included:   1) continued IV antibiotics; IV clindamycin 600 mg every 8 hours  2) as needed Toradol and oxycodone  3) Wound care nurse consult; likely to be assessed in the morning       Neelima Eldridge PA-C  Emergency Medicine/Clinical Decision Unit   [facility]   Available via Secure Chat

## 2025-03-07 ENCOUNTER — OFFICE VISIT (OUTPATIENT)
Dept: WOUND CARE | Facility: CLINIC | Age: 25
End: 2025-03-07
Payer: COMMERCIAL

## 2025-03-07 DIAGNOSIS — L97.922: Primary | ICD-10-CM

## 2025-03-07 PROCEDURE — 11042 DBRDMT SUBQ TIS 1ST 20SQCM/<: CPT

## 2025-03-07 PROCEDURE — 99213 OFFICE O/P EST LOW 20 MIN: CPT | Mod: 25

## 2025-03-09 LAB
BACTERIA SPEC AEROBE CULT: ABNORMAL
BACTERIA SPEC ANAEROBE CULT: ABNORMAL

## 2025-03-11 PROCEDURE — RXMED WILLOW AMBULATORY MEDICATION CHARGE

## 2025-03-11 RX ORDER — LEVOFLOXACIN 500 MG/1
500 TABLET, FILM COATED ORAL DAILY
Qty: 10 TABLET | Refills: 0 | Status: SHIPPED | OUTPATIENT
Start: 2025-03-11 | End: 2025-03-24

## 2025-03-13 LAB
BACTERIA SPEC AEROBE CULT: ABNORMAL
BACTERIA SPEC ANAEROBE CULT: ABNORMAL

## 2025-03-14 ENCOUNTER — PHARMACY VISIT (OUTPATIENT)
Dept: PHARMACY | Facility: CLINIC | Age: 25
End: 2025-03-14
Payer: MEDICARE

## 2025-03-14 ENCOUNTER — OFFICE VISIT (OUTPATIENT)
Dept: WOUND CARE | Facility: CLINIC | Age: 25
End: 2025-03-14
Payer: COMMERCIAL

## 2025-03-14 PROCEDURE — 11042 DBRDMT SUBQ TIS 1ST 20SQCM/<: CPT

## 2025-03-20 ENCOUNTER — TELEPHONE (OUTPATIENT)
Dept: HEMATOLOGY/ONCOLOGY | Facility: HOSPITAL | Age: 25
End: 2025-03-20
Payer: COMMERCIAL

## 2025-03-20 DIAGNOSIS — C81.03 NODULAR LYMPHOCYTE PREDOMINANT HODGKIN LYMPHOMA OF INTRA-ABDOMINAL LYMPH NODES (MULTI): ICD-10-CM

## 2025-03-20 NOTE — TELEPHONE ENCOUNTER
Attempted to call Joellen and his mom, Melissa about his missed appointment on 3/13 with Dr. Stoll.  I left detailed messages on both numbers about rescheduling his appointment and PET scan.    Dr. Stoll is available 4/3.  Earliest time they have available for PET scan is 3/28/2025 at 1130.           Called Melissa to confirm appointments above with her and she said that they will call back to get rescheduled for the PET Scan and FUV with Dr. Stoll.  They had a death in the family and will reschedule for after the services.      Orders are placed for when they are ready to schedule.

## 2025-03-21 ENCOUNTER — OFFICE VISIT (OUTPATIENT)
Dept: WOUND CARE | Facility: CLINIC | Age: 25
End: 2025-03-21
Payer: COMMERCIAL

## 2025-03-21 PROCEDURE — 99213 OFFICE O/P EST LOW 20 MIN: CPT

## 2025-03-24 ENCOUNTER — SOCIAL WORK (OUTPATIENT)
Dept: HEMATOLOGY/ONCOLOGY | Facility: HOSPITAL | Age: 25
End: 2025-03-24
Payer: COMMERCIAL

## 2025-03-25 NOTE — PROGRESS NOTES
Medical Certificate for Medical Necessity    Date Received: 3/24/2025  Utility Company: [employer] (P:[phone]/F: [phone])  Completed by: Ian Cruz MD   Submitted via Fax: on 03/25/25  Resources Offered: left vm for pt to discuss options  Additional Information: n/a     SW will continue to follow as needed.

## 2025-03-27 ENCOUNTER — APPOINTMENT (OUTPATIENT)
Dept: HEMATOLOGY/ONCOLOGY | Facility: HOSPITAL | Age: 25
End: 2025-03-27
Payer: COMMERCIAL

## 2025-03-28 ENCOUNTER — APPOINTMENT (OUTPATIENT)
Dept: RADIOLOGY | Facility: HOSPITAL | Age: 25
End: 2025-03-28
Payer: COMMERCIAL

## 2025-04-03 ENCOUNTER — APPOINTMENT (OUTPATIENT)
Dept: HEMATOLOGY/ONCOLOGY | Facility: HOSPITAL | Age: 25
End: 2025-04-03
Payer: COMMERCIAL

## 2025-04-08 ENCOUNTER — APPOINTMENT (OUTPATIENT)
Dept: RADIOLOGY | Facility: HOSPITAL | Age: 25
End: 2025-04-08
Payer: COMMERCIAL

## 2025-04-09 ENCOUNTER — OFFICE VISIT (OUTPATIENT)
Dept: HEMATOLOGY/ONCOLOGY | Facility: HOSPITAL | Age: 25
End: 2025-04-09
Payer: COMMERCIAL

## 2025-04-09 ENCOUNTER — HOSPITAL ENCOUNTER (OUTPATIENT)
Dept: RADIOLOGY | Facility: HOSPITAL | Age: 25
Discharge: HOME | End: 2025-04-09
Payer: COMMERCIAL

## 2025-04-09 VITALS
BODY MASS INDEX: 20.6 KG/M2 | TEMPERATURE: 97.7 F | RESPIRATION RATE: 15 BRPM | OXYGEN SATURATION: 90 % | SYSTOLIC BLOOD PRESSURE: 146 MMHG | HEART RATE: 84 BPM | DIASTOLIC BLOOD PRESSURE: 67 MMHG | WEIGHT: 164.8 LBS

## 2025-04-09 DIAGNOSIS — D57.00 SICKLE CELL ANEMIA WITH PAIN: ICD-10-CM

## 2025-04-09 DIAGNOSIS — C81.03 NODULAR LYMPHOCYTE PREDOMINANT HODGKIN LYMPHOMA OF INTRA-ABDOMINAL LYMPH NODES (MULTI): ICD-10-CM

## 2025-04-09 LAB — GLUCOSE BLD MANUAL STRIP-MCNC: 89 MG/DL (ref 74–99)

## 2025-04-09 PROCEDURE — 99214 OFFICE O/P EST MOD 30 MIN: CPT | Performed by: NURSE PRACTITIONER

## 2025-04-09 PROCEDURE — 82947 ASSAY GLUCOSE BLOOD QUANT: CPT

## 2025-04-09 PROCEDURE — 78815 PET IMAGE W/CT SKULL-THIGH: CPT | Mod: PS

## 2025-04-09 PROCEDURE — 99214 OFFICE O/P EST MOD 30 MIN: CPT | Mod: 25 | Performed by: NURSE PRACTITIONER

## 2025-04-09 PROCEDURE — 3430000001 HC RX 343 DIAGNOSTIC RADIOPHARMACEUTICALS: Performed by: INTERNAL MEDICINE

## 2025-04-09 PROCEDURE — A9552 F18 FDG: HCPCS | Performed by: INTERNAL MEDICINE

## 2025-04-09 PROCEDURE — 78815 PET IMAGE W/CT SKULL-THIGH: CPT | Mod: PET TUMOR SUBSQ TX STRATEGY | Performed by: RADIOLOGY

## 2025-04-09 RX ORDER — FOLIC ACID 1 MG/1
1 TABLET ORAL DAILY
Qty: 90 TABLET | Refills: 0 | Status: SHIPPED | OUTPATIENT
Start: 2025-04-09 | End: 2026-04-09

## 2025-04-09 RX ORDER — FLUDEOXYGLUCOSE F 18 200 MCI/ML
10.54 INJECTION, SOLUTION INTRAVENOUS
Status: COMPLETED | OUTPATIENT
Start: 2025-04-09 | End: 2025-04-09

## 2025-04-09 RX ADMIN — FLUDEOXYGLUCOSE F 18 10.54 MILLICURIE: 200 INJECTION, SOLUTION INTRAVENOUS at 07:15

## 2025-04-09 ASSESSMENT — PAIN SCALES - GENERAL: PAINLEVEL_OUTOF10: 0-NO PAIN

## 2025-04-09 NOTE — PROGRESS NOTES
Patient ID: Joellen Narayan is a 24 y.o. male.  Referring Physician: Homa Dickinson, APRN-CNP  [address]  Department of Medicine-Hematology [place] [address]  Primary Care Provider: Ian Cruz MD  Visit Type: Follow Up      Subjective  24 year old black male presents for follow up appointment for Sickle Cell SS type. He does not use any disease modifying treatment. He is under monitoring for Multiple Myeloma and had PET scan today. He does take Folic Acid daily. He denies large amounts of pain. He uses Tylenol and Ibuprofen for pain. He was last prescribed Oxycodone in January 2025. He uses Oxygen at home at night at 2 liters. His Oxygen saturation is 90% today. He denies smoking anything. He was last in ED on 1- for a leg wound from a . He had been hospitalized in December 2025 for Sickle Cell pain. 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 He is pleasant and cooperative. States he missed work today to come to our appointment. We discussed elevated blood pressure 146/67. He does not take any medication for blood pressure.    HPI    Review of Systems - Oncology     Objective   BSA: 1.99 meters squared  /67 (BP Location: Right arm, Patient Position: Sitting, BP Cuff Size: Adult)   Pulse 84   Temp 36.5 °C (97.7 °F) (Skin)   Resp 15   Wt 74.8 kg (164 lb 12.8 oz)   SpO2 90%   BMI 20.60 kg/m²      has a past medical history of Acute chest syndrome (Multi), Corrosion of unspecified body region, unspecified degree (12/31/2014), Impetigo (01/04/2024), Nicotine use disorder, Nodular lymphocyte predominant Hodgkin lymphoma, Personal history of diseases of the blood and blood-forming organs and certain disorders involving the immune mechanism, Personal history of other (healed) physical injury and trauma (01/03/2015), Personal history of other diseases of the circulatory system, Personal history of other diseases of the circulatory system, Priapism,  Rash and other nonspecific skin eruption (09/09/2014), and Sickle cell disease (Multi).   has a past surgical history that includes IR CVC tunneled (6/21/2022); CT guided percutaneous biopsy LYMPH node superficial (11/18/2022); and CT guided percutaneous abdominal retroperitoneum biopsy (11/30/2023).  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 He has been exposed to tobacco smoke. He has never used smokeless tobacco.  He  reports that he does not currently use alcohol.  He  reports current drug use. Drug: Marijuana.    Physical Exam  Vitals (146/67 discussed primary hypertension, O2 Saturation 90%) reviewed.   Constitutional:       Appearance: He is normal weight.   HENT:      Head: Normocephalic and atraumatic.      Nose: Nose normal.      Mouth/Throat:      Mouth: Mucous membranes are moist.   Eyes:      General: Scleral icterus present.      Pupils: Pupils are equal, round, and reactive to light.   Cardiovascular:      Rate and Rhythm: Normal rate.   Pulmonary:      Effort: Pulmonary effort is normal.      Breath sounds: Normal breath sounds.   Abdominal:      General: Bowel sounds are normal.      Palpations: Abdomen is soft.   Musculoskeletal:         General: Normal range of motion.      Cervical back: Normal range of motion and neck supple.   Skin:     General: Skin is warm and dry.      Capillary Refill: Capillary refill takes less than 2 seconds.   Neurological:       General: No focal deficit present.      Mental Status: He is alert and oriented to person, place, and time.   Psychiatric:         Mood and Affect: Mood normal.         Behavior: Behavior normal.         Judgment: Judgment normal.         WBC   Date/Time Value Ref Range Status   03/01/2025 08:04 AM 9.6 4.4 - 11.3 x10*3/uL Final   02/28/2025 12:57 PM 10.6 4.4 - 11.3 x10*3/uL Final   01/28/2025 12:05 PM 10.4 4.4 - 11.3 x10*3/uL Final     nRBC   Date Value Ref Range Status   03/01/2025 3.6 (H) 0.0 - 0.0 /100 WBCs Final   02/28/2025 3.5 (H) 0.0 - 0.0 /100 WBCs Final   01/28/2025 3.3 (H) 0.0 - 0.0 /100 WBCs Final     RBC   Date Value Ref Range Status   03/01/2025 2.99 (L) 4.50 - 5.90 x10*6/uL Final   02/28/2025 3.18 (L) 4.50 - 5.90 x10*6/uL Final   01/28/2025 3.35 (L) 4.50 - 5.90 x10*6/uL Final     Hemoglobin   Date Value Ref Range Status   03/01/2025 9.2 (L) 13.5 - 17.5 g/dL Final   02/28/2025 10.0 (L) 13.5 - 17.5 g/dL Final   01/28/2025 9.4 (L) 13.5 - 17.5 g/dL Final     Hematocrit   Date Value Ref Range Status   03/01/2025 25.3 (L) 41.0 - 52.0 % Final   02/28/2025 26.6 (L) 41.0 - 52.0 % Final   01/28/2025 29.1 (L) 41.0 - 52.0 % Final     MCV   Date/Time Value Ref Range Status   03/01/2025 08:04 AM 85 80 - 100 fL Final   02/28/2025 12:57 PM 84 80 - 100 fL Final   01/28/2025 12:05 PM 87 80 - 100 fL Final     MCH   Date/Time Value Ref Range Status   03/01/2025 08:04 AM 30.8 26.0 - 34.0 pg Final   02/28/2025 12:57 PM 31.4 26.0 - 34.0 pg Final   01/28/2025 12:05 PM 28.1 26.0 - 34.0 pg Final     MCHC   Date/Time Value Ref Range Status   03/01/2025 08:04 AM 36.4 (H) 32.0 - 36.0 g/dL Final   02/28/2025 12:57 PM 37.6 (H) 32.0 - 36.0 g/dL Final   01/28/2025 12:05 PM 32.3 32.0 - 36.0 g/dL Final     RDW   Date/Time Value Ref Range Status   03/01/2025 08:04 AM 23.7 (H) 11.5 - 14.5 % Final   02/28/2025 12:57 PM 23.1 (H) 11.5 - 14.5 % Final   01/28/2025 12:05 PM 23.7 (H) 11.5 - 14.5 % Final     Platelets   Date/Time Value Ref Range  Status   03/01/2025 08:04  150 - 450 x10*3/uL Final   02/28/2025 12:57  150 - 450 x10*3/uL Final   01/28/2025 12:05  150 - 450 x10*3/uL Final     MPV   Date/Time Value Ref Range Status   10/21/2023 03:02 AM 9.6 7.5 - 11.5 fL Final     Neutrophils %   Date/Time Value Ref Range Status   02/28/2025 12:57 PM 65.3 40.0 - 80.0 % Final   01/25/2025 07:10 AM 59.2 40.0 - 80.0 % Final   01/24/2025 08:51 AM 65.7 40.0 - 80.0 % Final     Immature Granulocytes %, Automated   Date/Time Value Ref Range Status   02/28/2025 12:57 PM 0.6 0.0 - 0.9 % Final     Comment:     Immature Granulocyte Count (IG) includes promyelocytes, myelocytes and metamyelocytes but does not include bands. Percent differential counts (%) should be interpreted in the context of the absolute cell counts (cells/UL).   01/28/2025 12:05 PM 0.7 0.0 - 0.9 % Final     Comment:     Immature Granulocyte Count (IG) includes promyelocytes, myelocytes and metamyelocytes but does not include bands. Percent differential counts (%) should be interpreted in the context of the absolute cell counts (cells/UL).   01/27/2025 08:19 AM 1.1 (H) 0.0 - 0.9 % Final     Comment:     Immature Granulocyte Count (IG) includes promyelocytes, myelocytes and metamyelocytes but does not include bands. Percent differential counts (%) should be interpreted in the context of the absolute cell counts (cells/UL).     Lymphocytes %, Manual   Date/Time Value Ref Range Status   01/28/2025 12:05 PM 22.0 13.0 - 44.0 % Final   01/27/2025 08:19 AM 17.2 13.0 - 44.0 % Final   01/26/2025 08:19 AM 17.6 13.0 - 44.0 % Final     Lymphocytes %   Date/Time Value Ref Range Status   02/28/2025 12:57 PM 23.2 13.0 - 44.0 % Final     Monocytes %, Manual   Date/Time Value Ref Range Status   01/28/2025 12:05 PM 6.4 2.0 - 10.0 % Final   01/27/2025 08:19 AM 7.7 2.0 - 10.0 % Final   01/26/2025 08:19 AM 10.5 2.0 - 10.0 % Final     Monocytes %   Date/Time Value Ref Range Status   02/28/2025 12:57 PM 8.3  2.0 - 10.0 % Final     Eosinophils %, Manual   Date/Time Value Ref Range Status   01/28/2025 12:05 PM 0.0 0.0 - 6.0 % Final   01/27/2025 08:19 AM 4.3 0.0 - 6.0 % Final   01/26/2025 08:19 AM 2.6 0.0 - 6.0 % Final     Eosinophils %   Date/Time Value Ref Range Status   02/28/2025 12:57 PM 2.3 0.0 - 6.0 % Final     Basophils %, Manual   Date/Time Value Ref Range Status   01/28/2025 12:05 PM 0.0 0.0 - 2.0 % Final   01/27/2025 08:19 AM 0.9 0.0 - 2.0 % Final   01/26/2025 08:19 AM 1.8 0.0 - 2.0 % Final     Basophils %   Date/Time Value Ref Range Status   02/28/2025 12:57 PM 0.3 0.0 - 2.0 % Final     Neutrophils Absolute   Date/Time Value Ref Range Status   02/28/2025 12:57 PM 6.96 1.20 - 7.70 x10*3/uL Final     Comment:     Percent differential counts (%) should be interpreted in the context of the absolute cell counts (cells/uL).   01/25/2025 07:10 AM 6.95 1.20 - 7.70 x10*3/uL Final     Comment:     Percent differential counts (%) should be interpreted in the context of the absolute cell counts (cells/uL).   01/24/2025 08:51 AM 7.88 (H) 1.20 - 7.70 x10*3/uL Final     Comment:     Percent differential counts (%) should be interpreted in the context of the absolute cell counts (cells/uL).     Immature Granulocytes Absolute, Automated   Date/Time Value Ref Range Status   02/28/2025 12:57 PM 0.06 0.00 - 0.70 x10*3/uL Final   01/28/2025 12:05 PM 0.07 0.00 - 0.70 x10*3/uL Final   01/27/2025 08:19 AM 0.12 0.00 - 0.70 x10*3/uL Final     Lymphocytes Absolute   Date/Time Value Ref Range Status   02/28/2025 12:57 PM 2.47 1.20 - 4.80 x10*3/uL Final   01/25/2025 07:10 AM 2.51 1.20 - 4.80 x10*3/uL Final   01/24/2025 08:51 AM 2.04 1.20 - 4.80 x10*3/uL Final     Monocytes Absolute   Date/Time Value Ref Range Status   02/28/2025 12:57 PM 0.88 0.10 - 1.00 x10*3/uL Final   01/25/2025 07:10 AM 1.45 (H) 0.10 - 1.00 x10*3/uL Final   01/24/2025 08:51 AM 1.47 (H) 0.10 - 1.00 x10*3/uL Final     Eosinophils Absolute   Date/Time Value Ref Range  "Status   02/28/2025 12:57 PM 0.24 0.00 - 0.70 x10*3/uL Final     Eosinophils Absolute, Manual   Date/Time Value Ref Range Status   01/28/2025 12:05 PM 0.00 0.00 - 0.70 x10*3/uL Final   01/27/2025 08:19 AM 0.46 0.00 - 0.70 x10*3/uL Final   01/26/2025 08:19 AM 0.29 0.00 - 0.70 x10*3/uL Final     Basophils Absolute   Date/Time Value Ref Range Status   02/28/2025 12:57 PM 0.03 0.00 - 0.10 x10*3/uL Final     Basophils Absolute, Manual   Date/Time Value Ref Range Status   01/28/2025 12:05 PM 0.00 0.00 - 0.10 x10*3/uL Final   01/27/2025 08:19 AM 0.10 0.00 - 0.10 x10*3/uL Final   01/26/2025 08:19 AM 0.20 (H) 0.00 - 0.10 x10*3/uL Final       No components found for: \"PT\"  aPTT   Date/Time Value Ref Range Status   01/21/2025 12:57 PM 29 27 - 38 seconds Final   01/16/2025 10:27 AM 29 27 - 38 seconds Final   12/22/2024 09:51 AM 25 (L) 27 - 38 seconds Final       Assessment/Plan    3 month follow up  Continue use of Folic Acid daily  Continue pain management per use of Tylenol and Ibuprofen  Continue to use Oxygen at night at 2 liters per minute  Follow up with Oncology for Multiple Myeloma monitoring  Receive back to work letter from  in My Chart               "

## 2025-04-09 NOTE — LETTER
April 9, 2025     Patient: Joellen Narayan   YOB: 2000   Date of Visit: 4/9/2025       To Whom It May Concern:    Joellen Narayan was seen in my clinic on 4/9/2025 at 11:00 am. Please excuse Joellen for his absence from work on this day to make the appointment.    If you have any questions or concerns, please don't hesitate to call.         Sincerely,         Homa Dickinson, APRN-CNP

## 2025-05-03 ENCOUNTER — HOSPITAL ENCOUNTER (INPATIENT)
Facility: HOSPITAL | Age: 25
End: 2025-05-03
Attending: EMERGENCY MEDICINE | Admitting: HOSPITALIST
Payer: COMMERCIAL

## 2025-05-03 ENCOUNTER — APPOINTMENT (OUTPATIENT)
Dept: HEMATOLOGY/ONCOLOGY | Facility: HOSPITAL | Age: 25
DRG: 981 | End: 2025-05-03
Payer: COMMERCIAL

## 2025-05-03 ENCOUNTER — CLINICAL SUPPORT (OUTPATIENT)
Dept: EMERGENCY MEDICINE | Facility: HOSPITAL | Age: 25
DRG: 981 | End: 2025-05-03
Payer: COMMERCIAL

## 2025-05-03 ENCOUNTER — APPOINTMENT (OUTPATIENT)
Dept: RADIOLOGY | Facility: HOSPITAL | Age: 25
DRG: 981 | End: 2025-05-03
Payer: COMMERCIAL

## 2025-05-03 DIAGNOSIS — M71.121 SEPTIC BURSITIS OF ELBOW, RIGHT: ICD-10-CM

## 2025-05-03 DIAGNOSIS — M86.9 OSTEOMYELITIS, UNSPECIFIED SITE, UNSPECIFIED TYPE (MULTI): ICD-10-CM

## 2025-05-03 DIAGNOSIS — D57.00 SICKLE CELL PAIN CRISIS (MULTI): Primary | ICD-10-CM

## 2025-05-03 DIAGNOSIS — D57.09 SICKLE CELL DISEASE WITH CRISIS AND OTHER COMPLICATION: ICD-10-CM

## 2025-05-03 DIAGNOSIS — G47.34 NOCTURNAL HYPOXIA: ICD-10-CM

## 2025-05-03 DIAGNOSIS — M79.601 PAIN IN RIGHT ARM: ICD-10-CM

## 2025-05-03 DIAGNOSIS — D57.00 HB-SS DISEASE WITH CRISIS: ICD-10-CM

## 2025-05-03 DIAGNOSIS — D57.00 SICKLE CELL ANEMIA WITH PAIN: ICD-10-CM

## 2025-05-03 DIAGNOSIS — C81.70: ICD-10-CM

## 2025-05-03 LAB
ABO GROUP (TYPE) IN BLOOD: NORMAL
ALBUMIN SERPL BCP-MCNC: 4.3 G/DL (ref 3.4–5)
ALP SERPL-CCNC: 115 U/L (ref 33–120)
ALT SERPL W P-5'-P-CCNC: 41 U/L (ref 10–52)
ANION GAP SERPL CALC-SCNC: 14 MMOL/L (ref 10–20)
ANTIBODY SCREEN: NORMAL
APTT PPP: 29 SECONDS (ref 26–36)
AST SERPL W P-5'-P-CCNC: 86 U/L (ref 9–39)
BASOPHILS # BLD AUTO: ABNORMAL 10*3/UL
BASOPHILS NFR BLD AUTO: ABNORMAL %
BILIRUB SERPL-MCNC: 9.5 MG/DL (ref 0–1.2)
BUN SERPL-MCNC: 9 MG/DL (ref 6–23)
CALCIUM SERPL-MCNC: 9 MG/DL (ref 8.6–10.6)
CHLORIDE SERPL-SCNC: 106 MMOL/L (ref 98–107)
CO2 SERPL-SCNC: 20 MMOL/L (ref 21–32)
CREAT SERPL-MCNC: 0.67 MG/DL (ref 0.5–1.3)
EGFRCR SERPLBLD CKD-EPI 2021: >90 ML/MIN/1.73M*2
EOSINOPHIL # BLD AUTO: ABNORMAL 10*3/UL
EOSINOPHIL NFR BLD AUTO: ABNORMAL %
ERYTHROCYTE [DISTWIDTH] IN BLOOD BY AUTOMATED COUNT: 25.8 % (ref 11.5–14.5)
GLUCOSE SERPL-MCNC: 89 MG/DL (ref 74–99)
HCT VFR BLD AUTO: 21 % (ref 41–52)
HGB BLD-MCNC: 8.1 G/DL (ref 13.5–17.5)
HGB RETIC QN: 33 PG (ref 28–38)
IMM GRANULOCYTES # BLD AUTO: 0.73 X10*3/UL (ref 0–0.7)
IMM GRANULOCYTES NFR BLD AUTO: 4.9 % (ref 0–0.9)
IMMATURE RETIC FRACTION: 28.7 %
INR PPP: 1.3 (ref 0.9–1.1)
LDH SERPL L TO P-CCNC: 779 U/L (ref 84–246)
LYMPHOCYTES # BLD AUTO: ABNORMAL 10*3/UL
LYMPHOCYTES NFR BLD AUTO: ABNORMAL %
MAGNESIUM SERPL-MCNC: 1.89 MG/DL (ref 1.6–2.4)
MCH RBC QN AUTO: 34 PG (ref 26–34)
MCHC RBC AUTO-ENTMCNC: 38.6 G/DL (ref 32–36)
MCV RBC AUTO: 88 FL (ref 80–100)
MONOCYTES # BLD AUTO: ABNORMAL 10*3/UL
MONOCYTES NFR BLD AUTO: ABNORMAL %
NEUTROPHILS # BLD AUTO: ABNORMAL 10*3/UL
NEUTROPHILS NFR BLD AUTO: ABNORMAL %
NRBC BLD-RTO: 11.9 /100 WBCS (ref 0–0)
PLATELET # BLD AUTO: 270 X10*3/UL (ref 150–450)
POTASSIUM SERPL-SCNC: 4.1 MMOL/L (ref 3.5–5.3)
PROT SERPL-MCNC: 6.4 G/DL (ref 6.4–8.2)
PROTHROMBIN TIME: 14 SECONDS (ref 9.8–12.4)
RBC # BLD AUTO: 2.38 X10*6/UL (ref 4.5–5.9)
RETICS #: 0.31 X10*6/UL (ref 0.02–0.12)
RETICS/RBC NFR AUTO: 13.1 % (ref 0.5–2)
RH FACTOR (ANTIGEN D): NORMAL
SICKLE CELL SCREEN: POSITIVE
SODIUM SERPL-SCNC: 136 MMOL/L (ref 136–145)
WBC # BLD AUTO: 14.8 X10*3/UL (ref 4.4–11.3)

## 2025-05-03 PROCEDURE — 83735 ASSAY OF MAGNESIUM: CPT

## 2025-05-03 PROCEDURE — 71275 CT ANGIOGRAPHY CHEST: CPT | Performed by: RADIOLOGY

## 2025-05-03 PROCEDURE — 71275 CT ANGIOGRAPHY CHEST: CPT

## 2025-05-03 PROCEDURE — 86901 BLOOD TYPING SEROLOGIC RH(D): CPT | Performed by: PHARMACIST

## 2025-05-03 PROCEDURE — 1200000003 HC ONCOLOGY  ROOM WITH TELEMETRY DAILY

## 2025-05-03 PROCEDURE — 93005 ELECTROCARDIOGRAM TRACING: CPT

## 2025-05-03 PROCEDURE — 71045 X-RAY EXAM CHEST 1 VIEW: CPT

## 2025-05-03 PROCEDURE — 2500000001 HC RX 250 WO HCPCS SELF ADMINISTERED DRUGS (ALT 637 FOR MEDICARE OP): Performed by: HOSPITALIST

## 2025-05-03 PROCEDURE — 83021 HEMOGLOBIN CHROMOTOGRAPHY: CPT | Performed by: PHARMACIST

## 2025-05-03 PROCEDURE — 2500000005 HC RX 250 GENERAL PHARMACY W/O HCPCS: Performed by: PHARMACIST

## 2025-05-03 PROCEDURE — 96376 TX/PRO/DX INJ SAME DRUG ADON: CPT

## 2025-05-03 PROCEDURE — 36415 COLL VENOUS BLD VENIPUNCTURE: CPT

## 2025-05-03 PROCEDURE — 2550000001 HC RX 255 CONTRASTS: Performed by: HOSPITALIST

## 2025-05-03 PROCEDURE — 93010 ELECTROCARDIOGRAM REPORT: CPT | Performed by: EMERGENCY MEDICINE

## 2025-05-03 PROCEDURE — 85027 COMPLETE CBC AUTOMATED: CPT

## 2025-05-03 PROCEDURE — 2500000004 HC RX 250 GENERAL PHARMACY W/ HCPCS (ALT 636 FOR OP/ED): Mod: TB

## 2025-05-03 PROCEDURE — 99223 1ST HOSP IP/OBS HIGH 75: CPT | Performed by: PHARMACIST

## 2025-05-03 PROCEDURE — 99285 EMERGENCY DEPT VISIT HI MDM: CPT | Mod: 25 | Performed by: EMERGENCY MEDICINE

## 2025-05-03 PROCEDURE — 83020 HEMOGLOBIN ELECTROPHORESIS: CPT | Performed by: PATHOLOGY

## 2025-05-03 PROCEDURE — 85610 PROTHROMBIN TIME: CPT | Performed by: PHARMACIST

## 2025-05-03 PROCEDURE — 99285 EMERGENCY DEPT VISIT HI MDM: CPT | Performed by: EMERGENCY MEDICINE

## 2025-05-03 PROCEDURE — 93010 ELECTROCARDIOGRAM REPORT: CPT | Performed by: INTERNAL MEDICINE

## 2025-05-03 PROCEDURE — 2500000004 HC RX 250 GENERAL PHARMACY W/ HCPCS (ALT 636 FOR OP/ED): Mod: JZ | Performed by: PHARMACIST

## 2025-05-03 PROCEDURE — 85045 AUTOMATED RETICULOCYTE COUNT: CPT

## 2025-05-03 PROCEDURE — 71045 X-RAY EXAM CHEST 1 VIEW: CPT | Performed by: STUDENT IN AN ORGANIZED HEALTH CARE EDUCATION/TRAINING PROGRAM

## 2025-05-03 PROCEDURE — 83615 LACTATE (LD) (LDH) ENZYME: CPT

## 2025-05-03 PROCEDURE — 2500000001 HC RX 250 WO HCPCS SELF ADMINISTERED DRUGS (ALT 637 FOR MEDICARE OP): Performed by: PHARMACIST

## 2025-05-03 PROCEDURE — 96374 THER/PROPH/DIAG INJ IV PUSH: CPT

## 2025-05-03 PROCEDURE — 36415 COLL VENOUS BLD VENIPUNCTURE: CPT | Performed by: PHARMACIST

## 2025-05-03 PROCEDURE — 85660 RBC SICKLE CELL TEST: CPT | Performed by: PHARMACIST

## 2025-05-03 PROCEDURE — 80053 COMPREHEN METABOLIC PANEL: CPT

## 2025-05-03 PROCEDURE — 86920 COMPATIBILITY TEST SPIN: CPT

## 2025-05-03 RX ORDER — CYCLOBENZAPRINE HCL 10 MG
10 TABLET ORAL 3 TIMES DAILY PRN
Status: DISPENSED | OUTPATIENT
Start: 2025-05-03

## 2025-05-03 RX ORDER — AMOXICILLIN 250 MG
1 CAPSULE ORAL NIGHTLY PRN
Status: ACTIVE | OUTPATIENT
Start: 2025-05-03

## 2025-05-03 RX ORDER — LIDOCAINE 560 MG/1
2 PATCH PERCUTANEOUS; TOPICAL; TRANSDERMAL DAILY
Status: DISPENSED | OUTPATIENT
Start: 2025-05-03

## 2025-05-03 RX ORDER — POLYETHYLENE GLYCOL 3350 17 G/17G
17 POWDER, FOR SOLUTION ORAL DAILY
Status: ACTIVE | OUTPATIENT
Start: 2025-05-03

## 2025-05-03 RX ORDER — NALOXONE HYDROCHLORIDE 0.4 MG/ML
0.2 INJECTION, SOLUTION INTRAMUSCULAR; INTRAVENOUS; SUBCUTANEOUS EVERY 5 MIN PRN
Status: ACTIVE | OUTPATIENT
Start: 2025-05-03

## 2025-05-03 RX ORDER — FOLIC ACID 1 MG/1
1 TABLET ORAL DAILY
Status: DISPENSED | OUTPATIENT
Start: 2025-05-03

## 2025-05-03 RX ORDER — ENOXAPARIN SODIUM 100 MG/ML
40 INJECTION SUBCUTANEOUS DAILY
Status: DISPENSED | OUTPATIENT
Start: 2025-05-03

## 2025-05-03 RX ORDER — HYDROMORPHONE HYDROCHLORIDE 1 MG/ML
1 INJECTION, SOLUTION INTRAMUSCULAR; INTRAVENOUS; SUBCUTANEOUS ONCE
Status: COMPLETED | OUTPATIENT
Start: 2025-05-03 | End: 2025-05-03

## 2025-05-03 RX ORDER — PSEUDOEPHEDRINE HYDROCHLORIDE 60 MG/1
60 TABLET ORAL EVERY 8 HOURS PRN
Status: DISPENSED | OUTPATIENT
Start: 2025-05-03

## 2025-05-03 RX ORDER — DIPHENHYDRAMINE HCL 25 MG
25 CAPSULE ORAL EVERY 6 HOURS PRN
Status: DISPENSED | OUTPATIENT
Start: 2025-05-03

## 2025-05-03 RX ORDER — KETOROLAC TROMETHAMINE 15 MG/ML
15 INJECTION, SOLUTION INTRAMUSCULAR; INTRAVENOUS EVERY 6 HOURS
Status: DISPENSED | OUTPATIENT
Start: 2025-05-03 | End: 2025-05-06

## 2025-05-03 RX ORDER — PANTOPRAZOLE SODIUM 40 MG/1
40 TABLET, DELAYED RELEASE ORAL
Status: DISPENSED | OUTPATIENT
Start: 2025-05-04 | End: 2025-05-07

## 2025-05-03 RX ORDER — HYDROMORPHONE HYDROCHLORIDE 1 MG/ML
5 INJECTION, SOLUTION INTRAMUSCULAR; INTRAVENOUS; SUBCUTANEOUS
Status: DISCONTINUED | OUTPATIENT
Start: 2025-05-03 | End: 2025-05-03

## 2025-05-03 RX ORDER — ACETAMINOPHEN 325 MG/1
975 TABLET ORAL EVERY 8 HOURS PRN
Status: DISCONTINUED | OUTPATIENT
Start: 2025-05-03 | End: 2025-05-04

## 2025-05-03 RX ORDER — ONDANSETRON 4 MG/1
4 TABLET, FILM COATED ORAL EVERY 8 HOURS PRN
Status: DISPENSED | OUTPATIENT
Start: 2025-05-03

## 2025-05-03 RX ADMIN — CYCLOBENZAPRINE 10 MG: 10 TABLET, FILM COATED ORAL at 23:33

## 2025-05-03 RX ADMIN — DIPHENHYDRAMINE HYDROCHLORIDE 25 MG: 25 CAPSULE ORAL at 15:32

## 2025-05-03 RX ADMIN — HYDROMORPHONE HYDROCHLORIDE 1 MG: 1 INJECTION, SOLUTION INTRAMUSCULAR; INTRAVENOUS; SUBCUTANEOUS at 07:26

## 2025-05-03 RX ADMIN — IOHEXOL 80 ML: 350 INJECTION, SOLUTION INTRAVENOUS at 19:49

## 2025-05-03 RX ADMIN — HYDROMORPHONE HYDROCHLORIDE 3 MG: 2 INJECTION, SOLUTION INTRAMUSCULAR; INTRAVENOUS; SUBCUTANEOUS at 08:25

## 2025-05-03 RX ADMIN — ENOXAPARIN SODIUM 40 MG: 100 INJECTION SUBCUTANEOUS at 13:38

## 2025-05-03 RX ADMIN — HYDROMORPHONE HYDROCHLORIDE 5 MG: 2 INJECTION, SOLUTION INTRAMUSCULAR; INTRAVENOUS; SUBCUTANEOUS at 21:05

## 2025-05-03 RX ADMIN — FOLIC ACID 1 MG: 1 TABLET ORAL at 11:05

## 2025-05-03 RX ADMIN — LIDOCAINE 4% 2 PATCH: 40 PATCH TOPICAL at 13:38

## 2025-05-03 RX ADMIN — HYDROMORPHONE HYDROCHLORIDE 4 MG: 2 INJECTION, SOLUTION INTRAMUSCULAR; INTRAVENOUS; SUBCUTANEOUS at 10:14

## 2025-05-03 RX ADMIN — ACETAMINOPHEN 975 MG: 325 TABLET, FILM COATED ORAL at 12:01

## 2025-05-03 RX ADMIN — HYDROMORPHONE HYDROCHLORIDE 5 MG: 2 INJECTION, SOLUTION INTRAMUSCULAR; INTRAVENOUS; SUBCUTANEOUS at 17:24

## 2025-05-03 RX ADMIN — HYDROMORPHONE HYDROCHLORIDE 4 MG: 2 INJECTION, SOLUTION INTRAMUSCULAR; INTRAVENOUS; SUBCUTANEOUS at 14:19

## 2025-05-03 RX ADMIN — HYDROMORPHONE HYDROCHLORIDE 3 MG: 2 INJECTION, SOLUTION INTRAMUSCULAR; INTRAVENOUS; SUBCUTANEOUS at 12:01

## 2025-05-03 RX ADMIN — KETOROLAC TROMETHAMINE 15 MG: 15 INJECTION, SOLUTION INTRAMUSCULAR; INTRAVENOUS at 13:38

## 2025-05-03 ASSESSMENT — PAIN SCALES - GENERAL
PAINLEVEL_OUTOF10: 10 - WORST POSSIBLE PAIN
PAINLEVEL_OUTOF10: 10 - WORST POSSIBLE PAIN
PAINLEVEL_OUTOF10: 9
PAINLEVEL_OUTOF10: 10 - WORST POSSIBLE PAIN
PAINLEVEL_OUTOF10: 0 - NO PAIN
PAINLEVEL_OUTOF10: 10 - WORST POSSIBLE PAIN
PAINLEVEL_OUTOF10: 0 - NO PAIN
PAINLEVEL_OUTOF10: 10 - WORST POSSIBLE PAIN

## 2025-05-03 ASSESSMENT — PAIN DESCRIPTION - LOCATION
LOCATION: BACK
LOCATION: HEAD
LOCATION: BACK
LOCATION: GENERALIZED
LOCATION: BACK
LOCATION: BACK

## 2025-05-03 ASSESSMENT — COGNITIVE AND FUNCTIONAL STATUS - GENERAL
MOBILITY SCORE: 24
DAILY ACTIVITIY SCORE: 24

## 2025-05-03 ASSESSMENT — PAIN DESCRIPTION - ORIENTATION
ORIENTATION: LOWER

## 2025-05-03 ASSESSMENT — ACTIVITIES OF DAILY LIVING (ADL): LACK_OF_TRANSPORTATION: NO

## 2025-05-03 ASSESSMENT — LIFESTYLE VARIABLES
EVER HAD A DRINK FIRST THING IN THE MORNING TO STEADY YOUR NERVES TO GET RID OF A HANGOVER: NO
TOTAL SCORE: 0
HAVE YOU EVER FELT YOU SHOULD CUT DOWN ON YOUR DRINKING: NO
EVER FELT BAD OR GUILTY ABOUT YOUR DRINKING: NO
HAVE PEOPLE ANNOYED YOU BY CRITICIZING YOUR DRINKING: NO

## 2025-05-03 ASSESSMENT — PAIN DESCRIPTION - DESCRIPTORS
DESCRIPTORS: ACHING
DESCRIPTORS: BURNING
DESCRIPTORS: THROBBING
DESCRIPTORS: ACHING
DESCRIPTORS: THROBBING

## 2025-05-03 ASSESSMENT — PAIN DESCRIPTION - FREQUENCY: FREQUENCY: CONSTANT/CONTINUOUS

## 2025-05-03 ASSESSMENT — PAIN DESCRIPTION - PAIN TYPE: TYPE: ACUTE PAIN

## 2025-05-03 NOTE — ED TRIAGE NOTES
Pt presents with 10/10 sickle cell crisis pain in his right arm, lower back and a headache that started this morning

## 2025-05-03 NOTE — ED PROVIDER NOTES
Emergency Department Provider Note        History of Present Illness     History provided by: Patient  Limitations to History: None  External Records Reviewed with Brief Summary: Discharge Summary from 1/28 which showed patient admitted for sickle cell pain crisis, requiring Dilaudid 3 mg every 2    HPI:  Joellen Narayan is a 24 y.o. 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  Patient states this is in his right arm and his back and is a throbbing sensation typical with his sickle cell pain.  No pain medications prior to arrival.  Patient states he has oxycodone at home but does not know his dose and states he is unsure of what his care plan is currently.  Patient denies any fevers, chills, chest pain shortness of breath cough or congestion.  No indwelling lines, history of DVT PE or trauma to right upper extremity.  He also endorses low back pain.  No known trauma, dysuria hematuria frequency.    Physical Exam   Triage vitals:  T 37.2 °C (99 °F)  HR 86  /59  RR 18  O2 98 % None (Room air)    General: Awake, alert, uncomfortable appearing  Eyes: Gaze conjugate.  No scleral icterus or injection  HENT: Normo-cephalic, atraumatic. No stridor  CV: Regular rate, regular rhythm. Radial pulses 2+ bilaterally  Resp: Breathing non-labored, speaking in full sentences.  Clear to auscultation bilaterally  GI: Soft, non-distended, non-tender. No rebound or guarding.  MSK/Extremities: No gross bony deformities. Moving all extremities  Skin: Warm. Appropriate color  Neuro: Alert. Oriented. Face symmetric. Speech is fluent.  Normal strength and sensation of bilateral upper and lower extremities.  Psych: Appropriate mood  and affect    Medical Decision Making & ED Course   Medical Decision Makin y.o. male with above stated PMH presenting with a chief complain of sickle cell pain crisis. Patient is hemodynamically stable, afebrile, saturating well on room air and uncomfortable appearing.  Patient does not have a known care plan in our system, typically gets Dilaudid per last hospitalization.  Patient denies any chest symptoms, is not hypoxic tachycardic side low concern for acute chest at this time.  IV inserted, will obtain basic labs LDH and reticulocyte count evaluate for BEL.  Patient started on milligram of Dilaudid, will uptitrate as needed.  EKG obtained and documented below.  Please see ED course for remainder patient care  ----      Differential diagnoses considered include but are not limited to: Sickle cell pain crisis, PE, acute chest, GI bleed, acute coronary, URI       Social Determinants of Health which Significantly Impact Care: None identified     EKG Independent Interpretation: EKG interpreted by myself. Please see ED Course for full interpretation.    Independent Result Review and Interpretation: Relevant laboratory and radiographic results were reviewed and independently interpreted by myself.  As necessary, they are commented on in the ED Course.    Chronic conditions affecting the patient's care: As documented above in [facility]    The patient was discussed with the following consultants/services: Admission Coordinator who accepted the patient for admission    Care Considerations: As documented above in [facility]    ED Course:  ED Course as of 25 1006   Sat May 03, 2025   0731 This patient was seen by the resident physician.  I have seen and examined the patient, agree with the workup, evaluation, management and diagnosis. The care plan has been discussed and I concur.    My assessment reveals a 24-year-old, Hodgkin's lymphoma surveillance, sickle cell disease resenting to the emergency department with sickle  cell pain crisis.  It is located in arm, lower back, and headache.  It had started last evening.  He does have a prior history of acute chest syndrome but states does not feel like his acute chest syndrome.  Vital signs not concerning for sepsis.  Patient at this time not requiring oxygen, does require nighttime use.  Patient abdominal exam was benign.  Patient does follow-up with the sickle cell clinic but does not have a care plan.  Will obtain labs and provide pain may patient.  Chest x-ray considered but not needed at this time given that patient was not having chest pain and has normal vital signs.    Discharge summary March 1, 2025 reviewed    Michael Saucedo DO  Department of Emergency Medicine  [facility]   [WJ]   0743 ECG 12 lead  EKG demonstrates normal sinus rhythm with a rate of 88 bpm.  Normal OR and QTc.  No ST elevations or depressions concerning for ischemia.  T wave inversions noted in leads V5 V6 and aVF.  Overall appears similar compared to prior December 25, 2024 [AW]   0903 CBC and Auto Differential(!)  Drop in Hb from a baseline around 9. New leukocytosis but no infectious symptoms [AW]   0903 Comprehensive metabolic panel(!)  Increasing AST and bilirubin compared to prior [AW]   0904 LDH(!): 779 [AW]   1005 Patient given 3 mg of IV Dilaudid after initial 1 mg.  Patient had no improvement in pain.  Started on 4 mg of IV Dilaudid however anticipate admission based on poor response to IV Dilaudid and lab work.  Patient comfortable with this, excepted to hematology [AW]      ED Course User Index  [AW] Slime Gallardo DO  [WJ] Chester Saucedo DO         Diagnoses as of 05/03/25 1006   Sickle cell pain crisis (Multi)     Disposition   As a result of their workup, the patient will require admission to the hospital.  The patient was informed of his diagnosis.  The patient was given the opportunity to ask questions and I answered them. The patient agreed to be [facility] to the  [facility].    Procedures   Procedures    Patient seen and discussed with ED attending physician.    Slime Gallardo DO  Emergency Medicine       Slime Gallardo DO  Resident  05/03/25 1007

## 2025-05-03 NOTE — PROGRESS NOTES
05/03/25 1511   Discharge Planning   Living Arrangements Parent   Support Systems Parent   Assistance Needed pt uses oxygen at night as needed, pt states the amount of liters depends on how is feeling   Type of Residence Private residence   Number of Stairs to Enter Residence 6   Number of Stairs Within Residence 0   Do you have animals or pets at home? No   Who is requesting discharge planning? Provider   Home or Post Acute Services In home services   Type of Home Care Services DME or oxygen   Expected Discharge Disposition Home   Financial Resource Strain   How hard is it for you to pay for the very basics like food, housing, medical care, and heating? Not hard   Housing Stability   In the last 12 months, was there a time when you were not able to pay the mortgage or rent on time? N   In the past 12 months, how many times have you moved where you were living? 0   At any time in the past 12 months, were you homeless or living in a shelter (including now)? N   Transportation Needs   In the past 12 months, has lack of transportation kept you from medical appointments or from getting medications? no   In the past 12 months, has lack of transportation kept you from meetings, work, or from getting things needed for daily living? No     Called into pt's room to introduce myself, role and to discuss discharge planning. Pt states he uses [facility] and that he lives with his mom. Pt denies being diabetic and denies being on dialysis. Pt states he does use oxygen at night and the amount of liters he uses depends on how he is feeling, pt denies any other DME needs prior to this admission. Pt plans to go home at discharge. Pt states he does not have a PCP, but pt's chart does have PCP listed(Dr. Cruz and Renee Barrera,APRN-CNP . Will continue to follow.
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
## 2025-05-03 NOTE — PROGRESS NOTES
Pharmacy Medication History Review    Joellen Narayan is a 24 y.o. male admitted for Sickle cell pain crisis (Multi). Pharmacy reviewed the patient's [id number] medications and allergies for accuracy.    Medications ADDED:  None   Medications CHANGED:  None   Medications REMOVED:   None     The list below reflects the updated PTA list.   Prior to Admission Medications   Prescriptions Last Dose Informant   baclofen (Lioresal) 5 mg tablet Not Taking Self   Sig: Take 1 tablet (5 mg) by mouth 3 times a day.   Patient not taking: Reported on 5/3/2025   chlorhexidine (Hibiclens) 4 % external liquid Not Taking Self   Sig: Apply topically once daily as needed for wound care. Use for 5 days prior to surgery as body wash, do not use on face or genital region   Patient not taking: Reported on 5/3/2025   folic acid (Folvite) 1 mg tablet 5/2/2025 Self   Sig: Take 1 tablet (1 mg) by mouth once daily.   oxyCODONE (Roxicodone) 20 mg immediate release tablet Not Taking Self   Sig: Take 1 tablet (20 mg) by mouth every 8 hours if needed for severe pain (7 - 10).   Patient not taking: Reported on 5/3/2025 Patient is out of this medication at home.    pseudoephedrine (Sudogest) 60 mg tablet Not Taking Self   Sig: Take 1 tablet (60 mg) by mouth every 8 hours if needed for congestion for up to 10 days.   Patient not taking: Reported on 5/3/2025      Facility-Administered Medications: None        The list below reflects the updated allergy list. Please review each documented allergy for additional clarification and justification.  Allergies  Reviewed by Brock Dial RN on 5/3/2025        Severity Reactions Comments    Cefepime Medium Hallucinations     Amoxicillin Low Hives     Ceftriaxone Low Hives, Rash             Patient accepts M2B at discharge.     Sources:   [facility]  Pharmacy dispense history  Patient Interview Good historian  Chart Review  Care Everywhere     Additional Comments:  None       Lisa Vega  Pharmacy  "Technician  05/03/25     Secure Chat preferred   If no response call [phone] or Vocera \"Med Rec\"   "

## 2025-05-03 NOTE — CARE PLAN
The patient's goals for the shift include      The clinical goals for the shift include Patient will remain free from falls and injury.    Over the shift, the patient did  make progress toward the following goals. Barriers to progression include increase pain in bilateral legs. Recommendations to address these barriers include call for assistance.

## 2025-05-04 VITALS
WEIGHT: 160 LBS | RESPIRATION RATE: 16 BRPM | BODY MASS INDEX: 20.53 KG/M2 | OXYGEN SATURATION: 97 % | TEMPERATURE: 97.3 F | HEIGHT: 74 IN | SYSTOLIC BLOOD PRESSURE: 116 MMHG | DIASTOLIC BLOOD PRESSURE: 67 MMHG | HEART RATE: 68 BPM

## 2025-05-04 LAB
ALBUMIN SERPL BCP-MCNC: 4.5 G/DL (ref 3.4–5)
ALP SERPL-CCNC: 103 U/L (ref 33–120)
ALT SERPL W P-5'-P-CCNC: 34 U/L (ref 10–52)
ANION GAP SERPL CALC-SCNC: 11 MMOL/L (ref 10–20)
AST SERPL W P-5'-P-CCNC: 85 U/L (ref 9–39)
BASE EXCESS BLDV CALC-SCNC: -6.7 MMOL/L (ref -2–3)
BILIRUB SERPL-MCNC: 13.4 MG/DL (ref 0–1.2)
BODY TEMPERATURE: 37 DEGREES CELSIUS
BUN SERPL-MCNC: 6 MG/DL (ref 6–23)
CA-I BLD-SCNC: 1.15 MMOL/L (ref 1.1–1.33)
CALCIUM SERPL-MCNC: 9.1 MG/DL (ref 8.6–10.6)
CHLORIDE SERPL-SCNC: 106 MMOL/L (ref 98–107)
CO2 SERPL-SCNC: 26 MMOL/L (ref 21–32)
CREAT SERPL-MCNC: 0.73 MG/DL (ref 0.5–1.3)
CRP SERPL-MCNC: 5.16 MG/DL
EGFRCR SERPLBLD CKD-EPI 2021: >90 ML/MIN/1.73M*2
ERYTHROCYTE [DISTWIDTH] IN BLOOD BY AUTOMATED COUNT: 26.5 % (ref 11.5–14.5)
ERYTHROCYTE [SEDIMENTATION RATE] IN BLOOD BY WESTERGREN METHOD: <1 MM/H (ref 0–15)
GLUCOSE SERPL-MCNC: 73 MG/DL (ref 74–99)
GRAM STN SPEC: NORMAL
GRAM STN SPEC: NORMAL
HCO3 BLDV-SCNC: 20.6 MMOL/L (ref 22–26)
HCT VFR BLD AUTO: 19.9 % (ref 41–52)
HGB BLD-MCNC: 7.4 G/DL (ref 13.5–17.5)
HGB RETIC QN: 31 PG (ref 28–38)
IMMATURE RETIC FRACTION: 31.3 %
INHALED O2 CONCENTRATION: 0 %
LDH SERPL L TO P-CCNC: 896 U/L (ref 84–246)
MAGNESIUM SERPL-MCNC: 2.27 MG/DL (ref 1.6–2.4)
MCH RBC QN AUTO: 31.9 PG (ref 26–34)
MCHC RBC AUTO-ENTMCNC: 37.2 G/DL (ref 32–36)
MCV RBC AUTO: 86 FL (ref 80–100)
NRBC BLD-RTO: 19.7 /100 WBCS (ref 0–0)
OXYHGB MFR BLDV: 66.4 % (ref 45–75)
PCO2 BLDV: 47 MM HG (ref 41–51)
PH BLDV: 7.25 PH (ref 7.33–7.43)
PLATELET # BLD AUTO: 268 X10*3/UL (ref 150–450)
PO2 BLDV: 75 MM HG (ref 35–45)
POTASSIUM SERPL-SCNC: 3.9 MMOL/L (ref 3.5–5.3)
PROT SERPL-MCNC: 6.7 G/DL (ref 6.4–8.2)
RBC # BLD AUTO: 2.32 X10*6/UL (ref 4.5–5.9)
RETICS #: 0.34 X10*6/UL (ref 0.02–0.12)
RETICS/RBC NFR AUTO: 14.5 % (ref 0.5–2)
SAO2 % BLDV: 70 % (ref 45–75)
SODIUM SERPL-SCNC: 139 MMOL/L (ref 136–145)
WBC # BLD AUTO: 10.5 X10*3/UL (ref 4.4–11.3)

## 2025-05-04 PROCEDURE — 83020 HEMOGLOBIN ELECTROPHORESIS: CPT | Performed by: PATHOLOGY

## 2025-05-04 PROCEDURE — 87449 NOS EACH ORGANISM AG IA: CPT

## 2025-05-04 PROCEDURE — 83021 HEMOGLOBIN CHROMOTOGRAPHY: CPT

## 2025-05-04 PROCEDURE — 86140 C-REACTIVE PROTEIN: CPT

## 2025-05-04 PROCEDURE — 85045 AUTOMATED RETICULOCYTE COUNT: CPT | Performed by: HOSPITALIST

## 2025-05-04 PROCEDURE — 2500000004 HC RX 250 GENERAL PHARMACY W/ HCPCS (ALT 636 FOR OP/ED): Mod: TB | Performed by: PHARMACIST

## 2025-05-04 PROCEDURE — 2500000005 HC RX 250 GENERAL PHARMACY W/O HCPCS: Performed by: PHARMACIST

## 2025-05-04 PROCEDURE — 83615 LACTATE (LD) (LDH) ENZYME: CPT | Performed by: HOSPITALIST

## 2025-05-04 PROCEDURE — 80053 COMPREHEN METABOLIC PANEL: CPT | Performed by: HOSPITALIST

## 2025-05-04 PROCEDURE — 36415 COLL VENOUS BLD VENIPUNCTURE: CPT

## 2025-05-04 PROCEDURE — 99233 SBSQ HOSP IP/OBS HIGH 50: CPT

## 2025-05-04 PROCEDURE — 2500000001 HC RX 250 WO HCPCS SELF ADMINISTERED DRUGS (ALT 637 FOR MEDICARE OP): Performed by: PHARMACIST

## 2025-05-04 PROCEDURE — 82330 ASSAY OF CALCIUM: CPT | Performed by: PHARMACIST

## 2025-05-04 PROCEDURE — 2500000004 HC RX 250 GENERAL PHARMACY W/ HCPCS (ALT 636 FOR OP/ED): Mod: TB

## 2025-05-04 PROCEDURE — 85027 COMPLETE CBC AUTOMATED: CPT | Performed by: HOSPITALIST

## 2025-05-04 PROCEDURE — 83735 ASSAY OF MAGNESIUM: CPT | Performed by: PHARMACIST

## 2025-05-04 PROCEDURE — 2500000001 HC RX 250 WO HCPCS SELF ADMINISTERED DRUGS (ALT 637 FOR MEDICARE OP): Performed by: HOSPITALIST

## 2025-05-04 PROCEDURE — 2500000001 HC RX 250 WO HCPCS SELF ADMINISTERED DRUGS (ALT 637 FOR MEDICARE OP)

## 2025-05-04 PROCEDURE — 36415 COLL VENOUS BLD VENIPUNCTURE: CPT | Performed by: HOSPITALIST

## 2025-05-04 PROCEDURE — 82805 BLOOD GASES W/O2 SATURATION: CPT

## 2025-05-04 PROCEDURE — 85652 RBC SED RATE AUTOMATED: CPT

## 2025-05-04 PROCEDURE — 87899 AGENT NOS ASSAY W/OPTIC: CPT

## 2025-05-04 PROCEDURE — 1200000003 HC ONCOLOGY  ROOM WITH TELEMETRY DAILY

## 2025-05-04 PROCEDURE — 2500000004 HC RX 250 GENERAL PHARMACY W/ HCPCS (ALT 636 FOR OP/ED): Performed by: HOSPITALIST

## 2025-05-04 PROCEDURE — 2500000004 HC RX 250 GENERAL PHARMACY W/ HCPCS (ALT 636 FOR OP/ED): Mod: JZ

## 2025-05-04 PROCEDURE — 87205 SMEAR GRAM STAIN: CPT

## 2025-05-04 RX ORDER — HYDROMORPHONE HYDROCHLORIDE 2 MG/ML
5 INJECTION, SOLUTION INTRAMUSCULAR; INTRAVENOUS; SUBCUTANEOUS EVERY 2 HOUR PRN
Status: DISCONTINUED | OUTPATIENT
Start: 2025-05-04 | End: 2025-05-04 | Stop reason: SDUPTHER

## 2025-05-04 RX ORDER — ACETAMINOPHEN 325 MG/1
975 TABLET ORAL EVERY 8 HOURS
Status: DISCONTINUED | OUTPATIENT
Start: 2025-05-04 | End: 2025-05-04

## 2025-05-04 RX ADMIN — CYCLOBENZAPRINE 10 MG: 10 TABLET, FILM COATED ORAL at 04:14

## 2025-05-04 RX ADMIN — PIPERACILLIN SODIUM AND TAZOBACTAM SODIUM 3.38 G: 3; .375 INJECTION, SOLUTION INTRAVENOUS at 06:46

## 2025-05-04 RX ADMIN — KETOROLAC TROMETHAMINE 15 MG: 15 INJECTION, SOLUTION INTRAMUSCULAR; INTRAVENOUS at 06:46

## 2025-05-04 RX ADMIN — HYDROMORPHONE HYDROCHLORIDE 5 MG: 2 INJECTION, SOLUTION INTRAMUSCULAR; INTRAVENOUS; SUBCUTANEOUS at 12:03

## 2025-05-04 RX ADMIN — ACETAMINOPHEN 975 MG: 325 TABLET, FILM COATED ORAL at 20:46

## 2025-05-04 RX ADMIN — HYDROMORPHONE HYDROCHLORIDE 5 MG: 2 INJECTION, SOLUTION INTRAMUSCULAR; INTRAVENOUS; SUBCUTANEOUS at 06:47

## 2025-05-04 RX ADMIN — HYDROMORPHONE HYDROCHLORIDE 5 MG: 2 INJECTION, SOLUTION INTRAMUSCULAR; INTRAVENOUS; SUBCUTANEOUS at 03:51

## 2025-05-04 RX ADMIN — DIPHENHYDRAMINE HYDROCHLORIDE 25 MG: 25 CAPSULE ORAL at 00:22

## 2025-05-04 RX ADMIN — LIDOCAINE 4% 2 PATCH: 40 PATCH TOPICAL at 08:34

## 2025-05-04 RX ADMIN — ACETAMINOPHEN 975 MG: 325 TABLET, FILM COATED ORAL at 04:14

## 2025-05-04 RX ADMIN — PIPERACILLIN SODIUM AND TAZOBACTAM SODIUM 3.38 G: 3; .375 INJECTION, SOLUTION INTRAVENOUS at 12:40

## 2025-05-04 RX ADMIN — HYDROMORPHONE HYDROCHLORIDE 5 MG: 2 INJECTION, SOLUTION INTRAMUSCULAR; INTRAVENOUS; SUBCUTANEOUS at 16:25

## 2025-05-04 RX ADMIN — ACETAMINOPHEN 975 MG: 325 TABLET, FILM COATED ORAL at 12:03

## 2025-05-04 RX ADMIN — KETOROLAC TROMETHAMINE 15 MG: 15 INJECTION, SOLUTION INTRAMUSCULAR; INTRAVENOUS at 18:30

## 2025-05-04 RX ADMIN — HYDROMORPHONE HYDROCHLORIDE 5 MG: 2 INJECTION, SOLUTION INTRAMUSCULAR; INTRAVENOUS; SUBCUTANEOUS at 22:45

## 2025-05-04 RX ADMIN — HYDROMORPHONE HYDROCHLORIDE 5 MG: 2 INJECTION, SOLUTION INTRAMUSCULAR; INTRAVENOUS; SUBCUTANEOUS at 14:14

## 2025-05-04 RX ADMIN — ONDANSETRON HYDROCHLORIDE 4 MG: 4 TABLET, FILM COATED ORAL at 00:09

## 2025-05-04 RX ADMIN — KETOROLAC TROMETHAMINE 15 MG: 15 INJECTION, SOLUTION INTRAMUSCULAR; INTRAVENOUS at 12:40

## 2025-05-04 RX ADMIN — HYDROMORPHONE HYDROCHLORIDE 5 MG: 2 INJECTION, SOLUTION INTRAMUSCULAR; INTRAVENOUS; SUBCUTANEOUS at 00:09

## 2025-05-04 RX ADMIN — HYDROMORPHONE HYDROCHLORIDE 5 MG: 2 INJECTION, SOLUTION INTRAMUSCULAR; INTRAVENOUS; SUBCUTANEOUS at 20:46

## 2025-05-04 RX ADMIN — HYDROMORPHONE HYDROCHLORIDE 5 MG: 2 INJECTION, SOLUTION INTRAMUSCULAR; INTRAVENOUS; SUBCUTANEOUS at 18:30

## 2025-05-04 RX ADMIN — PIPERACILLIN SODIUM AND TAZOBACTAM SODIUM 3.38 G: 3; .375 INJECTION, SOLUTION INTRAVENOUS at 01:44

## 2025-05-04 RX ADMIN — PANTOPRAZOLE SODIUM 40 MG: 40 TABLET, DELAYED RELEASE ORAL at 06:46

## 2025-05-04 RX ADMIN — KETOROLAC TROMETHAMINE 15 MG: 15 INJECTION, SOLUTION INTRAMUSCULAR; INTRAVENOUS at 00:17

## 2025-05-04 RX ADMIN — FOLIC ACID 1 MG: 1 TABLET ORAL at 08:32

## 2025-05-04 RX ADMIN — DIPHENHYDRAMINE HYDROCHLORIDE 25 MG: 25 CAPSULE ORAL at 08:32

## 2025-05-04 RX ADMIN — ENOXAPARIN SODIUM 40 MG: 100 INJECTION SUBCUTANEOUS at 08:32

## 2025-05-04 ASSESSMENT — PAIN SCALES - GENERAL
PAINLEVEL_OUTOF10: 10 - WORST POSSIBLE PAIN
PAINLEVEL_OUTOF10: 9
PAINLEVEL_OUTOF10: 10 - WORST POSSIBLE PAIN
PAINLEVEL_OUTOF10: 10 - WORST POSSIBLE PAIN
PAINLEVEL_OUTOF10: 9
PAINLEVEL_OUTOF10: 10 - WORST POSSIBLE PAIN
PAINLEVEL_OUTOF10: 9

## 2025-05-04 ASSESSMENT — COGNITIVE AND FUNCTIONAL STATUS - GENERAL
MOBILITY SCORE: 24
MOBILITY SCORE: 24
DAILY ACTIVITIY SCORE: 24
DAILY ACTIVITIY SCORE: 24

## 2025-05-04 ASSESSMENT — PAIN - FUNCTIONAL ASSESSMENT
PAIN_FUNCTIONAL_ASSESSMENT: 0-10

## 2025-05-04 ASSESSMENT — PAIN DESCRIPTION - ORIENTATION: ORIENTATION: RIGHT

## 2025-05-04 ASSESSMENT — PAIN DESCRIPTION - LOCATION: LOCATION: ARM

## 2025-05-04 NOTE — CARE PLAN
The patient's goals for the shift include  rest and comfort    The clinical goals for the shift include Patient will remain free from falls and injury.

## 2025-05-04 NOTE — PROGRESS NOTES
" Progress Note    Joellen Narayan is a 24 y.o. male on day 1 of admission presenting with Sickle cell pain crisis (Multi).    Subjective   Overnight: NAEO     Patient endorsing significant pain in right arm, and right hip region, tender to palpation.  Patient not very complaint with O2 supplementation. Denies CP, SOB, HA       Objective     Physical Exam  Constitutional:       General: He is in acute distress.      Appearance: Normal appearance.   HENT:      Head: Normocephalic and atraumatic.      Mouth/Throat:      Mouth: Mucous membranes are moist.      Pharynx: Oropharynx is clear.   Eyes:      Extraocular Movements: Extraocular movements intact.   Cardiovascular:      Rate and Rhythm: Regular rhythm. Tachycardia present.   Pulmonary:      Effort: Pulmonary effort is normal. No respiratory distress.      Breath sounds: Normal breath sounds.   Abdominal:      General: Abdomen is flat.      Palpations: Abdomen is soft.   Musculoskeletal:         General: Tenderness present. No swelling, deformity or signs of injury.   Skin:     General: Skin is warm and dry.   Neurological:      General: No focal deficit present.      Mental Status: He is alert and oriented to person, place, and time.     Last Recorded Vitals  Blood pressure 116/61, pulse 76, temperature 36.6 °C (97.9 °F), temperature source Temporal, resp. rate 16, height 1.88 m (6' 2\"), weight 72.6 kg (160 lb), SpO2 95%.  Intake/Output last 3 Shifts:  No intake/output data recorded.    Relevant Results                           Imaging  CT angio chest for pulmonary embolism  Result Date: 5/4/2025  1. No evidence of acute pulmonary embolism. 2. Similar ground-glass bibasilar dependent opacities that most likely represent atelectasis. However, acute chest syndrome is in the differential in the appropriate clinical setting. 3. Interval resolution of the prior focal nodular opacity within the posterior right middle lobe.   I personally reviewed the images/study and " I agree with the findings as stated by Gil Bonilla MD. This study was interpreted at [facility], [place], [place].   MACRO: None   Signed by: Ed Wren 5/4/2025 7:44 AM Dictation workstation:   [id number]    XR chest 1 view  Result Date: 5/3/2025  No evidence of acute cardiopulmonary process.     MACRO: None   Signed by: Omi Petty 5/3/2025 1:18 PM Dictation workstation:   [id number]      Cardiology, Vascular, and Other Imaging  No other imaging results found for the past 7 days       Assessment/Plan    24 y.o. 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 Started on IV dilaudid  plan. Patient initially 98% on room air, however became hypoxic in the emergency department requiring 2-4 liters. Possibly related to poor inspiratory effort in setting of severe pain.  No acute process noted on chest xray and no cough, fever, chills so no active concern for acute chest at this time. Will order CT PE to r/o pulmonary embolism. No other significant changes in hemodynamics as patient remains afebrile normotensive with regular rate rhythm. Discharge pending improvement in pain and resolution of hypoxia.      Update 5/4:  -Discharge Zosyn   -PNA labs ordered   -Increased Dilaudid to q2h; Low trigger to initiate PCA pump   -If worsening hemolysis, ESR/CRP, or abnomnral vitals (fevers/tachy) > low threshold for MRI to r/o Osteo     #Complicated Pain Crisis   #Hgb SS with Pain  :: OARRS reviewed on admission  :: Hgb baseline: 9-10, 8.1 on admisison  - T.bili: 9.5  LDH: 779 Retic: 13.1  :: S/p 4mg total dilaudid in ED  - Admission, inititating 4mg dilaudid q2h prn, IV  toradol 15mg q6h x 3 days, lidocaine patch, flexeril as needed, tylenol   - Continue folic acid daily  - Follow up Hgb S, Utox  - Exchange labs ordered (T&S, coag, ionized calcium)  - Oral ondansetron + diphenydramine  - Bowel regimen with docusate-senna + miralax  -Dilaudid PO 5mg q2 prn      #Acute Hypoxic Respiratory Failure, requiring 2-4L on admission  ::On 2-3L at night, however patient reports daytime hypoxia as well in the past  ::Ddx acute chest, less likely, vs poor inspiratory effort in setting of pain vs less likely pneumonia vs less likely PE  CXR: without any acute abnormalities  - Continuous pulse oximetry  - Continue supportive O2 with NC  - Follow up CT PE     #Hx of Priapism  - Continue sudafed PRN     # Nodular lymphocyte predominant Hodgkins lymphoma (NLPHL)    :: Follows with Dr. Stoll  - Not currently on active chemo     #Hx of nocturnal oxygen dependence  :: 2-3 L overnight  :: Pulm outpatient appt scheduled back in February  - Will need pulm outpatient scheduling     Prophylaxis:  - Enoxaparin      Fluids: Replete PRN  Electrolytes: Keep mg >2, phos >3  and K >4  Nutrition:  Adult diet Regular   DVT PPX: Lovenox  Bowel care:Miralax  Lines:PIV  Oxygen:Room Air     Code Status: Full Code (confirmed on admission)   NOK:  Primary Emergency Contact: Melissa Narayan, Home Phone: [phone]           Shawn Neil MD

## 2025-05-04 NOTE — SIGNIFICANT EVENT
Received handoff on patient for 1301-8120 shift. Patient had CT/PE scan earlier in the evening and I reviewed results (wet read).     IMPRESSION:  1. No evidence of acute pulmonary embolism to segmental level. Please  note that, assessment of subsegmental branches is limited and small  peripheral emboli are not entirely excluded.  2. Similar ground-glass bibasilar dependent opacities that likely  represent atelectasis. However, acute chest syndrome is in the  differential in the appropriate clinical setting.  3. Interval resolution of the prior focal nodular opacity within the  posterior right middle lobe.  4. Stable sequela of sickle cell disease with splenic sequela as  described above.     I reached out to patient's RN Di who reported patient remains stable. He is taking off oxygen from time to time but no reports of distress. Vital signs stable. Given allergy to ceftriaxone, I did start him on prophylactic Zosyn. Transfusion labs ordered earlier today. ABG was also requested earlier. I did ask RN to obtain ionized calcium as it was still pending collection and ABG from respiratory that was still pending. Will continue to monitor closely and with any changes I will reach out to hematology/transfusion medicine.       0420: CNP on floor to assess patient after venous blood gas resulted. Patient up in room without ordered oxygen on, returning to bed from bathroom. No signs of distress. Denies any new shortness of breath. Alert and oriented x 4 with no new complaints. Physical assessment stable. Currently on continuous pulse ox.  Vital signs stable. Reminded patient importance of oxygen compliance. He verbalized understanding. Will continue to monitor at this time.

## 2025-05-04 NOTE — PROGRESS NOTES
I assumed care of pt this am. See Logan note for full details    24 yOM with PMH notable for sickle cell dx (c/b ACS/priapism/AVN/pHTN; on nocturnal 2L NC), Hodgkin's Lymphoma (on observation) presenting for 1 day of acute RUE/RLE pain with associated n/v. Currently afebrile/HDS/on 4L NC    #Complicated Sickle Cell Pain Crisis  #Concern for Osteomyelitis   -Ongoing 9/10 pain in RUE/RLE; ongoing n/v; denies f/c/weakness of extremities/recent trauma   -Hgb/lysis labs worse than baseline; add on ESR/CRP  -Blood cultures x 2  -Low threshold for MRI given known osteonecrosis and ?OM of L extremities in 1/2025  -5mg IV dilaudid q2hrs, tylenol/toradol/lidocaine patch; low threshhold to plan on PCA   -Hold empiric abx for now  -Consider exchange if labs/symptoms worsen    #AHRF  -Likely 2/2 to atelectasis from pain; low suspicion of ACS  -Currently afebrile/HDS/on 4L NC; denies f/c/SOB/cough/CP  -CTPE with stable b/l LE opacities likely atelectasis; no PE  -Full infectious PNA valente  -Incentive spirometer, pain control; wean O2 as tolerated  -Hold empiric abx for now

## 2025-05-05 ENCOUNTER — APPOINTMENT (OUTPATIENT)
Dept: VASCULAR MEDICINE | Facility: HOSPITAL | Age: 25
DRG: 981 | End: 2025-05-05
Payer: COMMERCIAL

## 2025-05-05 ENCOUNTER — APPOINTMENT (OUTPATIENT)
Dept: HEMATOLOGY/ONCOLOGY | Facility: HOSPITAL | Age: 25
DRG: 981 | End: 2025-05-05
Payer: COMMERCIAL

## 2025-05-05 ENCOUNTER — APPOINTMENT (OUTPATIENT)
Dept: RADIOLOGY | Facility: HOSPITAL | Age: 25
DRG: 981 | End: 2025-05-05
Payer: COMMERCIAL

## 2025-05-05 LAB
ALBUMIN SERPL BCP-MCNC: 4.6 G/DL (ref 3.4–5)
ALP SERPL-CCNC: 97 U/L (ref 33–120)
ALT SERPL W P-5'-P-CCNC: 31 U/L (ref 10–52)
ANION GAP SERPL CALC-SCNC: 11 MMOL/L (ref 10–20)
AST SERPL W P-5'-P-CCNC: 76 U/L (ref 9–39)
BASOPHILS # BLD AUTO: 0.03 X10*3/UL (ref 0–0.1)
BASOPHILS NFR BLD AUTO: 0.3 %
BILIRUB DIRECT SERPL-MCNC: 2.4 MG/DL (ref 0–0.3)
BILIRUB SERPL-MCNC: 12.9 MG/DL (ref 0–1.2)
BUN SERPL-MCNC: 6 MG/DL (ref 6–23)
CA-I BLD-SCNC: 1.28 MMOL/L (ref 1.1–1.33)
CALCIUM SERPL-MCNC: 9.4 MG/DL (ref 8.6–10.6)
CHLORIDE SERPL-SCNC: 105 MMOL/L (ref 98–107)
CO2 SERPL-SCNC: 28 MMOL/L (ref 21–32)
CREAT SERPL-MCNC: 0.65 MG/DL (ref 0.5–1.3)
CRP SERPL-MCNC: 7.85 MG/DL
EGFRCR SERPLBLD CKD-EPI 2021: >90 ML/MIN/1.73M*2
EOSINOPHIL # BLD AUTO: 0.4 X10*3/UL (ref 0–0.7)
EOSINOPHIL NFR BLD AUTO: 3.9 %
ERYTHROCYTE [DISTWIDTH] IN BLOOD BY AUTOMATED COUNT: 26.6 % (ref 11.5–14.5)
ERYTHROCYTE [SEDIMENTATION RATE] IN BLOOD BY WESTERGREN METHOD: <1 MM/H (ref 0–15)
FLUAV RNA RESP QL NAA+PROBE: NOT DETECTED
FLUBV RNA RESP QL NAA+PROBE: NOT DETECTED
GLUCOSE SERPL-MCNC: 57 MG/DL (ref 74–99)
HCT VFR BLD AUTO: 20.5 % (ref 41–52)
HGB BLD-MCNC: 7.5 G/DL (ref 13.5–17.5)
HGB RETIC QN: 32 PG (ref 28–38)
IMM GRANULOCYTES # BLD AUTO: 0.14 X10*3/UL (ref 0–0.7)
IMM GRANULOCYTES NFR BLD AUTO: 1.4 % (ref 0–0.9)
IMMATURE RETIC FRACTION: 37.8 %
LDH SERPL L TO P-CCNC: 862 U/L (ref 84–246)
LEGIONELLA AG UR QL: NEGATIVE
LYMPHOCYTES # BLD AUTO: 1.81 X10*3/UL (ref 1.2–4.8)
LYMPHOCYTES NFR BLD AUTO: 17.8 %
MCH RBC QN AUTO: 31.5 PG (ref 26–34)
MCHC RBC AUTO-ENTMCNC: 36.6 G/DL (ref 32–36)
MCV RBC AUTO: 86 FL (ref 80–100)
MONOCYTES # BLD AUTO: 1.11 X10*3/UL (ref 0.1–1)
MONOCYTES NFR BLD AUTO: 10.9 %
NEUTROPHILS # BLD AUTO: 6.69 X10*3/UL (ref 1.2–7.7)
NEUTROPHILS NFR BLD AUTO: 65.7 %
NRBC BLD-RTO: 24 /100 WBCS (ref 0–0)
PLATELET # BLD AUTO: 231 X10*3/UL (ref 150–450)
POLYCHROMASIA BLD QL SMEAR: NORMAL
POTASSIUM SERPL-SCNC: 3.8 MMOL/L (ref 3.5–5.3)
PROT SERPL-MCNC: 7 G/DL (ref 6.4–8.2)
RBC # BLD AUTO: 2.38 X10*6/UL (ref 4.5–5.9)
RBC MORPH BLD: NORMAL
RETICS #: 0.34 X10*6/UL (ref 0.02–0.12)
RETICS/RBC NFR AUTO: 14.4 % (ref 0.5–2)
S PNEUM AG UR QL: NEGATIVE
SARS-COV-2 RNA RESP QL NAA+PROBE: NOT DETECTED
SICKLE CELLS BLD QL SMEAR: NORMAL
SODIUM SERPL-SCNC: 140 MMOL/L (ref 136–145)
TARGETS BLD QL SMEAR: NORMAL
WBC # BLD AUTO: 10.2 X10*3/UL (ref 4.4–11.3)

## 2025-05-05 PROCEDURE — 2500000001 HC RX 250 WO HCPCS SELF ADMINISTERED DRUGS (ALT 637 FOR MEDICARE OP): Performed by: HOSPITALIST

## 2025-05-05 PROCEDURE — 2500000004 HC RX 250 GENERAL PHARMACY W/ HCPCS (ALT 636 FOR OP/ED): Mod: JZ,TB

## 2025-05-05 PROCEDURE — 36415 COLL VENOUS BLD VENIPUNCTURE: CPT

## 2025-05-05 PROCEDURE — 82330 ASSAY OF CALCIUM: CPT

## 2025-05-05 PROCEDURE — 85652 RBC SED RATE AUTOMATED: CPT

## 2025-05-05 PROCEDURE — 71045 X-RAY EXAM CHEST 1 VIEW: CPT

## 2025-05-05 PROCEDURE — 2500000005 HC RX 250 GENERAL PHARMACY W/O HCPCS: Performed by: PHARMACIST

## 2025-05-05 PROCEDURE — 85025 COMPLETE CBC W/AUTO DIFF WBC: CPT

## 2025-05-05 PROCEDURE — 82248 BILIRUBIN DIRECT: CPT

## 2025-05-05 PROCEDURE — 93010 ELECTROCARDIOGRAM REPORT: CPT | Performed by: INTERNAL MEDICINE

## 2025-05-05 PROCEDURE — 86140 C-REACTIVE PROTEIN: CPT

## 2025-05-05 PROCEDURE — 93005 ELECTROCARDIOGRAM TRACING: CPT

## 2025-05-05 PROCEDURE — 2500000001 HC RX 250 WO HCPCS SELF ADMINISTERED DRUGS (ALT 637 FOR MEDICARE OP)

## 2025-05-05 PROCEDURE — 1200000003 HC ONCOLOGY  ROOM WITH TELEMETRY DAILY

## 2025-05-05 PROCEDURE — 2500000004 HC RX 250 GENERAL PHARMACY W/ HCPCS (ALT 636 FOR OP/ED): Mod: TB

## 2025-05-05 PROCEDURE — 2500000001 HC RX 250 WO HCPCS SELF ADMINISTERED DRUGS (ALT 637 FOR MEDICARE OP): Performed by: PHARMACIST

## 2025-05-05 PROCEDURE — 93971 EXTREMITY STUDY: CPT | Performed by: STUDENT IN AN ORGANIZED HEALTH CARE EDUCATION/TRAINING PROGRAM

## 2025-05-05 PROCEDURE — 36430 TRANSFUSION BLD/BLD COMPNT: CPT

## 2025-05-05 PROCEDURE — 2500000004 HC RX 250 GENERAL PHARMACY W/ HCPCS (ALT 636 FOR OP/ED): Mod: JZ | Performed by: PHARMACIST

## 2025-05-05 PROCEDURE — 80053 COMPREHEN METABOLIC PANEL: CPT

## 2025-05-05 PROCEDURE — 2500000004 HC RX 250 GENERAL PHARMACY W/ HCPCS (ALT 636 FOR OP/ED): Mod: JZ

## 2025-05-05 PROCEDURE — 85045 AUTOMATED RETICULOCYTE COUNT: CPT

## 2025-05-05 PROCEDURE — 99233 SBSQ HOSP IP/OBS HIGH 50: CPT

## 2025-05-05 PROCEDURE — 83615 LACTATE (LD) (LDH) ENZYME: CPT

## 2025-05-05 PROCEDURE — 87636 SARSCOV2 & INF A&B AMP PRB: CPT

## 2025-05-05 PROCEDURE — 93971 EXTREMITY STUDY: CPT

## 2025-05-05 PROCEDURE — P9040 RBC LEUKOREDUCED IRRADIATED: HCPCS

## 2025-05-05 RX ORDER — ACETAMINOPHEN 325 MG/1
650 TABLET ORAL ONCE
Status: COMPLETED | OUTPATIENT
Start: 2025-05-05 | End: 2025-05-05

## 2025-05-05 RX ORDER — DIPHENHYDRAMINE HCL 25 MG
25 CAPSULE ORAL ONCE
Status: COMPLETED | OUTPATIENT
Start: 2025-05-05 | End: 2025-05-11

## 2025-05-05 RX ORDER — KETOROLAC TROMETHAMINE 30 MG/ML
30 INJECTION, SOLUTION INTRAMUSCULAR; INTRAVENOUS EVERY 6 HOURS
Status: COMPLETED | OUTPATIENT
Start: 2025-05-05 | End: 2025-05-06

## 2025-05-05 RX ORDER — DEXTROSE MONOHYDRATE AND SODIUM CHLORIDE 5; .45 G/100ML; G/100ML
75 INJECTION, SOLUTION INTRAVENOUS CONTINUOUS
Status: DISCONTINUED | OUTPATIENT
Start: 2025-05-05 | End: 2025-05-06

## 2025-05-05 RX ADMIN — KETOROLAC TROMETHAMINE 30 MG: 30 INJECTION, SOLUTION INTRAMUSCULAR; INTRAVENOUS at 20:29

## 2025-05-05 RX ADMIN — HYDROMORPHONE HYDROCHLORIDE 5 MG: 2 INJECTION, SOLUTION INTRAMUSCULAR; INTRAVENOUS; SUBCUTANEOUS at 06:41

## 2025-05-05 RX ADMIN — FOLIC ACID 1 MG: 1 TABLET ORAL at 08:24

## 2025-05-05 RX ADMIN — HYDROMORPHONE HYDROCHLORIDE 3 MG: 2 INJECTION, SOLUTION INTRAMUSCULAR; INTRAVENOUS; SUBCUTANEOUS at 08:15

## 2025-05-05 RX ADMIN — HYDROMORPHONE HYDROCHLORIDE 2 MG: 2 INJECTION, SOLUTION INTRAMUSCULAR; INTRAVENOUS; SUBCUTANEOUS at 01:49

## 2025-05-05 RX ADMIN — HYDROMORPHONE HYDROCHLORIDE 5.5 MG: 2 INJECTION, SOLUTION INTRAMUSCULAR; INTRAVENOUS; SUBCUTANEOUS at 13:49

## 2025-05-05 RX ADMIN — CYCLOBENZAPRINE 10 MG: 10 TABLET, FILM COATED ORAL at 18:10

## 2025-05-05 RX ADMIN — HYDROMORPHONE HYDROCHLORIDE 5 MG: 2 INJECTION, SOLUTION INTRAMUSCULAR; INTRAVENOUS; SUBCUTANEOUS at 00:47

## 2025-05-05 RX ADMIN — HYDROMORPHONE HYDROCHLORIDE 5.5 MG: 2 INJECTION, SOLUTION INTRAMUSCULAR; INTRAVENOUS; SUBCUTANEOUS at 20:29

## 2025-05-05 RX ADMIN — HYDROMORPHONE HYDROCHLORIDE 5 MG: 2 INJECTION, SOLUTION INTRAMUSCULAR; INTRAVENOUS; SUBCUTANEOUS at 03:57

## 2025-05-05 RX ADMIN — HYDROMORPHONE HYDROCHLORIDE 5.5 MG: 2 INJECTION, SOLUTION INTRAMUSCULAR; INTRAVENOUS; SUBCUTANEOUS at 16:05

## 2025-05-05 RX ADMIN — HYDROMORPHONE HYDROCHLORIDE 5 MG: 2 INJECTION, SOLUTION INTRAMUSCULAR; INTRAVENOUS; SUBCUTANEOUS at 09:50

## 2025-05-05 RX ADMIN — HYDROMORPHONE HYDROCHLORIDE 5.5 MG: 2 INJECTION, SOLUTION INTRAMUSCULAR; INTRAVENOUS; SUBCUTANEOUS at 22:31

## 2025-05-05 RX ADMIN — LIDOCAINE 4% 2 PATCH: 40 PATCH TOPICAL at 08:25

## 2025-05-05 RX ADMIN — SALINE NASAL SPRAY 1 SPRAY: 1.5 SOLUTION NASAL at 16:03

## 2025-05-05 RX ADMIN — HYDROMORPHONE HYDROCHLORIDE 5.5 MG: 2 INJECTION, SOLUTION INTRAMUSCULAR; INTRAVENOUS; SUBCUTANEOUS at 18:17

## 2025-05-05 RX ADMIN — KETOROLAC TROMETHAMINE 15 MG: 15 INJECTION, SOLUTION INTRAMUSCULAR; INTRAVENOUS at 13:48

## 2025-05-05 RX ADMIN — PANTOPRAZOLE SODIUM 40 MG: 40 TABLET, DELAYED RELEASE ORAL at 06:41

## 2025-05-05 RX ADMIN — ACETAMINOPHEN 650 MG: 325 TABLET, FILM COATED ORAL at 18:10

## 2025-05-05 RX ADMIN — KETOROLAC TROMETHAMINE 15 MG: 15 INJECTION, SOLUTION INTRAMUSCULAR; INTRAVENOUS at 01:49

## 2025-05-05 RX ADMIN — HYDROMORPHONE HYDROCHLORIDE 5 MG: 2 INJECTION, SOLUTION INTRAMUSCULAR; INTRAVENOUS; SUBCUTANEOUS at 11:54

## 2025-05-05 RX ADMIN — CYCLOBENZAPRINE 10 MG: 10 TABLET, FILM COATED ORAL at 08:24

## 2025-05-05 RX ADMIN — KETOROLAC TROMETHAMINE 15 MG: 15 INJECTION, SOLUTION INTRAMUSCULAR; INTRAVENOUS at 08:25

## 2025-05-05 RX ADMIN — DEXTROSE AND SODIUM CHLORIDE 75 ML/HR: 5; 450 INJECTION, SOLUTION INTRAVENOUS at 16:04

## 2025-05-05 RX ADMIN — DIPHENHYDRAMINE HCL 25 MG: 25 CAPSULE ORAL at 01:50

## 2025-05-05 ASSESSMENT — PAIN SCALES - GENERAL
PAINLEVEL_OUTOF10: 10 - WORST POSSIBLE PAIN
PAINLEVEL_OUTOF10: 8
PAINLEVEL_OUTOF10: 10 - WORST POSSIBLE PAIN
PAINLEVEL_OUTOF10: 9
PAINLEVEL_OUTOF10: 10 - WORST POSSIBLE PAIN
PAINLEVEL_OUTOF10: 10 - WORST POSSIBLE PAIN
PAINLEVEL_OUTOF10: 8
PAINLEVEL_OUTOF10: 9
PAINLEVEL_OUTOF10: 10 - WORST POSSIBLE PAIN
PAINLEVEL_OUTOF10: 8
PAINLEVEL_OUTOF10: 10 - WORST POSSIBLE PAIN
PAINLEVEL_OUTOF10: 10 - WORST POSSIBLE PAIN
PAINLEVEL_OUTOF10: 7
PAINLEVEL_OUTOF10: 10 - WORST POSSIBLE PAIN
PAINLEVEL_OUTOF10: 0 - NO PAIN
PAINLEVEL_OUTOF10: 8

## 2025-05-05 ASSESSMENT — COGNITIVE AND FUNCTIONAL STATUS - GENERAL
DAILY ACTIVITIY SCORE: 24
MOBILITY SCORE: 24

## 2025-05-05 ASSESSMENT — PAIN - FUNCTIONAL ASSESSMENT
PAIN_FUNCTIONAL_ASSESSMENT: 0-10

## 2025-05-05 NOTE — CARE PLAN
Problem: Pain - Adult  Goal: Verbalizes/displays adequate comfort level or baseline comfort level  Outcome: Progressing     Problem: Safety - Adult  Goal: Free from fall injury  Outcome: Progressing     Problem: Discharge Planning  Goal: Discharge to home or other facility with appropriate resources  Outcome: Progressing     Problem: Chronic Conditions and Co-morbidities  Goal: Patient's chronic conditions and co-morbidity symptoms are monitored and maintained or improved  Outcome: Progressing     Problem: Nutrition  Goal: Nutrient intake appropriate for maintaining nutritional needs  Outcome: Progressing     Problem: Fall/Injury  Goal: Not fall by end of shift  Outcome: Progressing  Goal: Be free from injury by end of the shift  Outcome: Progressing  Goal: Verbalize understanding of personal risk factors for fall in the hospital  Outcome: Progressing  Goal: Verbalize understanding of risk factor reduction measures to prevent injury from fall in the home  Outcome: Progressing   The patient's goals for the shift include      The clinical goals for the shift include patient to remain free of fall/ injury by the end of the shift

## 2025-05-05 NOTE — PROGRESS NOTES
Joellen Narayan is a 24 y.o. male on day 2 of admission presenting with Sickle cell pain crisis (Multi).    Subjective   Pt seen at bedside this AM. Appears uncomfortable, holding his R arm. States his sickle cell pain in back and chest have improved but the R arm continues to bother him. We discussed plan to obtain MRI r/o osteo in setting of his prior hx cf/ osteo back in Jan 2025, elevated inflammatory markers, and ongoing severe pain. He is declining MRI despite education and importance for workup. Offered premedication, pt still declining at this time. Agreeable to US as there is some swelling noted by elbow. He states this does not feel like acute chest syndrome in the past. We discussed that MRI is still warranted at this time for further eval, pt states will reassess his decision after US.    Denies nausea, vomiting, fever, chills, chest pain, constipation, diarrhea, bleeding.        Objective     Physical Exam  Constitutional:       Appearance: He is ill-appearing.   HENT:      Head: Normocephalic.      Right Ear: External ear normal.      Left Ear: External ear normal.      Nose: Nose normal.   Eyes:      Extraocular Movements: Extraocular movements intact.      Conjunctiva/sclera: Conjunctivae normal.   Cardiovascular:      Rate and Rhythm: Normal rate and regular rhythm.   Pulmonary:      Effort: Pulmonary effort is normal.      Breath sounds: Normal breath sounds.   Abdominal:      General: Bowel sounds are normal.      Palpations: Abdomen is soft.   Musculoskeletal:      Cervical back: Normal range of motion.   Skin:     General: Skin is warm and dry.   Neurological:      Mental Status: He is oriented to person, place, and time. Mental status is at baseline.   Psychiatric:         Mood and Affect: Mood normal.         Behavior: Behavior normal.         Thought Content: Thought content normal.         Last Recorded Vitals  Blood pressure 136/74, pulse 65, temperature 36.3 °C (97.3 °F), temperature  "source Temporal, resp. rate 16, height 1.88 m (6' 2\"), weight 72.6 kg (160 lb), SpO2 98%.  Intake/Output last 3 Shifts:  I/O last 3 completed shifts:  In: 240 (3.3 mL/kg) [P.O.:240]  Out: 300 (4.1 mL/kg) [Urine:300 (0.1 mL/kg/hr)]  Weight: 72.6 kg     Relevant Results  .Scheduled medications  Scheduled Medications[1]  Continuous medications  Continuous Medications[2]  PRN medications  PRN Medications[3]    .  Results for orders placed or performed during the hospital encounter of 05/03/25 (from the past 24 hours)   Respiratory Culture/Smear    Specimen: SPUTUM; Fluid   Result Value Ref Range    Respiratory Culture/Smear Culture in progress     Gram Stain       Gram stain indicates specimen consists of lower respiratory tract secretions.    Gram Stain No predominant organism    CBC and Auto Differential   Result Value Ref Range    WBC 10.2 4.4 - 11.3 x10*3/uL    nRBC 24.0 (H) 0.0 - 0.0 /100 WBCs    RBC 2.38 (L) 4.50 - 5.90 x10*6/uL    Hemoglobin 7.5 (L) 13.5 - 17.5 g/dL    Hematocrit 20.5 (L) 41.0 - 52.0 %    MCV 86 80 - 100 fL    MCH 31.5 26.0 - 34.0 pg    MCHC 36.6 (H) 32.0 - 36.0 g/dL    RDW 26.6 (H) 11.5 - 14.5 %    Platelets 231 150 - 450 x10*3/uL    Neutrophils % 65.7 40.0 - 80.0 %    Immature Granulocytes %, Automated 1.4 (H) 0.0 - 0.9 %    Lymphocytes % 17.8 13.0 - 44.0 %    Monocytes % 10.9 2.0 - 10.0 %    Eosinophils % 3.9 0.0 - 6.0 %    Basophils % 0.3 0.0 - 2.0 %    Neutrophils Absolute 6.69 1.20 - 7.70 x10*3/uL    Immature Granulocytes Absolute, Automated 0.14 0.00 - 0.70 x10*3/uL    Lymphocytes Absolute 1.81 1.20 - 4.80 x10*3/uL    Monocytes Absolute 1.11 (H) 0.10 - 1.00 x10*3/uL    Eosinophils Absolute 0.40 0.00 - 0.70 x10*3/uL    Basophils Absolute 0.03 0.00 - 0.10 x10*3/uL   Comprehensive metabolic panel   Result Value Ref Range    Glucose 57 (L) 74 - 99 mg/dL    Sodium 140 136 - 145 mmol/L    Potassium 3.8 3.5 - 5.3 mmol/L    Chloride 105 98 - 107 mmol/L    Bicarbonate 28 21 - 32 mmol/L    Anion Gap " 11 10 - 20 mmol/L    Urea Nitrogen 6 6 - 23 mg/dL    Creatinine 0.65 0.50 - 1.30 mg/dL    eGFR >90 >60 mL/min/1.73m*2    Calcium 9.4 8.6 - 10.6 mg/dL    Albumin 4.6 3.4 - 5.0 g/dL    Alkaline Phosphatase 97 33 - 120 U/L    Total Protein 7.0 6.4 - 8.2 g/dL    AST 76 (H) 9 - 39 U/L    Bilirubin, Total 12.9 (H) 0.0 - 1.2 mg/dL    ALT 31 10 - 52 U/L   Reticulocytes   Result Value Ref Range    Retic % 14.4 (H) 0.5 - 2.0 %    Retic Absolute 0.342 (H) 0.022 - 0.118 x10*6/uL    Reticulocyte Hemoglobin 32 28 - 38 pg    Immature Retic fraction 37.8 (H) <=16.0 %   Lactate dehydrogenase   Result Value Ref Range     (H) 84 - 246 U/L   Calcium, ionized   Result Value Ref Range    POCT Calcium, Ionized 1.28 1.1 - 1.33 mmol/L   Bilirubin, direct   Result Value Ref Range    Bilirubin, Direct 2.4 (H) 0.0 - 0.3 mg/dL   Morphology   Result Value Ref Range    RBC Morphology See Below     Polychromasia Marked     Sickle Cells Many     Target Cells Few    Prepare RBC: 1 Units, Leukocytes Reduced (CMV reduced risk)   Result Value Ref Range    PRODUCT CODE D1801H67     Unit Number [id number]     Unit ABO B     Unit RH NEG     XM INTEP COMP     Dispense Status XM     Blood Expiration Date 5/20/2025 11:59:00 PM EDT     PRODUCT BLOOD TYPE 1700     UNIT VOLUME 277    C-reactive protein   Result Value Ref Range    C-Reactive Protein 7.85 (H) <1.00 mg/dL   Sedimentation rate, automated   Result Value Ref Range    Sedimentation Rate <1 0 - 15 mm/h     *Note: Due to a large number of results and/or encounters for the requested time period, some results have not been displayed. A complete set of results can be found in Results Review.       Assessment & Plan  Sickle cell pain crisis (Multi)    [facility] 24 y.o. 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 Patient initially 98% on room air, however became hypoxic in ED requiring 2-4 liters, unclear etiology for hypoxia at this time, possibly related to poor inspiratory effort in setting of severe pain vs ACS (low suspicion at this time, however close monitoring) vs infectious process. CXR w/o evidence of acute process. CT PE negative, similar GGOs from prior scans unchanged. Pt asymptomatic, denies respiratory s/s. S/p Zosyn, held empiric atbx 5/4. 5/5 ECHO and VERÓNICA US pending.     # R arm pain  - original etiology sickle cell related vs AVN vs osteomyelitis vs thrombus   - does not radiate to arm, jaw, chest   - no imaging obtained on admit, pt states acute pain feels like sickle cell pain   - Jan 2025 there was > hx of OM of lower extremities therefore low threshold for MRI  - 5/3 EKG NSR, no ST elevation appears similar to prior   - ESR/ CRP <1/5.16 (5/4) --> <1/7.85 (5/5)  - 5/5 offered MRI R arm pt declining at this time even when offered with premedication, agreeable to VERÓNICA US in interim, pending   - increased to 5.5mg IVP dilaudid q2 PRN (5/5), offered dPC pt declining at this time as well     # Hgb SS disease   # hx acute chest syndrome   - OARRS reviewed on admission, no aberrancies noted   - Hgb baseline: 9-10, 8.1 on admission, (5/5) Hg 7.5, in setting of worsening hemolysis and pain, x1u pRBC ordered   - T.bili: 9.5  LDH: 779 Retic: 13.1  - Admission, inititating 4mg dilaudid q2h prn, IV toradol 15mg q6h x 3 days, lidocaine patch, flexeril as needed, tylenol   - c/w home folic acid daily  - utox pending   - Exchange labs resulted 5/2-5/5  - 5/4 Hg S 92.8%  - started 5.5mg IVP dilaudid q2 PRN severe pain 5/5   - bowel regimen for opioid induced constipation, zofran for opioid  induced nausea, and benadrly for opioid induced pruritis      # AHRF, requiring 2-4L on admission  - On 2-3L at night, however patient reports daytime hypoxia as well in the past, currently on 4L 5/5  - unclear at this time, etiology includes acute chest vs poor inspiratory effort in setting of pain vs pneumonia vs PE vs pulm htn  - 5/3 Cxr w/o evidence of acute process   - 5/3 CT PE w/o evidence of PE, similar GGOs from prior likely atelectasis (similar to Dec 2024)   - strep pneum, legionella negative 5/4   - 5/5 ECHO pending   - trop pending 5/5   - 5/5 repeat CXR pending   - c/w continuous pulse oximetry  - Continue supportive O2 with NC  - 5/5 EKG NSR  - respx cx 5/4 negative thus far  - low c/f infectious process at this time, empirix atbx discontinued 5/4 per Dr. Correia      # Hx Priapism  - no active issues on admit   - hx previously drained by urology   - c/w home sudafed PRN     # Nodular lymphocyte predominant Hodgkins lymphoma (NLPHL)    - Follows with Dr. Stoll  - s/p Rituxan and prednisone q3 weeks (C1 1/18/24, C2 2/8/24, Missed C3 d/t sickle cell pain crisis, C4 4/4/24, C5 5/16/24, C6 6/7/24)   - 4/9 PET showed interval development of new FDG focus in mid abdomen  - will need onc appt scheduled prior to discharge      # Hx of nocturnal oxygen dependence  -  baseline 2-3 L overnight  - Pulm outpatient appt scheduled back in February  - Will need pulm outpatient scheduling     # prophy  - Enoxaparin   - SCDs, encouraged ambulation     # dispo  - Code Status: Full Code (confirmed on admission)   - DC home resumed O2 pending improvement in pain and O2 workup   - NOK:  Primary Emergency Contact: Melissa Narayan, Home Phone: [phone] - fuv 7/9 sickle cell clinic     I spent >60 minutes in the professional and overall care of this patient.    Assessment and plan as above discussed with attending physician Dr. Parnell.      Evelyne Matt, RAAD-CNP         [1] acetaminophen, 650 mg, oral,  Once  diphenhydrAMINE, 25 mg, oral, Once  enoxaparin, 40 mg, subcutaneous, Daily  folic acid, 1 mg, oral, Daily  ketorolac, 15 mg, intravenous, q6h  lidocaine, 2 patch, transdermal, Daily  pantoprazole, 40 mg, oral, Daily before breakfast  polyethylene glycol, 17 g, oral, Daily  sodium chloride, 1 spray, Each Nostril, 4x daily  [2]    [3] PRN medications: cyclobenzaprine, diphenhydrAMINE, HYDROmorphone, naloxone, ondansetron, pseudoephedrine, sennosides-docusate sodium, sodium chloride

## 2025-05-06 ENCOUNTER — APPOINTMENT (OUTPATIENT)
Dept: CARDIOLOGY | Facility: HOSPITAL | Age: 25
DRG: 981 | End: 2025-05-06
Payer: COMMERCIAL

## 2025-05-06 LAB
ALBUMIN SERPL BCP-MCNC: 4.2 G/DL (ref 3.4–5)
ALP SERPL-CCNC: 101 U/L (ref 33–120)
ALT SERPL W P-5'-P-CCNC: 39 U/L (ref 10–52)
ANION GAP SERPL CALC-SCNC: 11 MMOL/L (ref 10–20)
AORTIC VALVE MEAN GRADIENT: 9 MMHG
AORTIC VALVE PEAK VELOCITY: 2.12 M/S
AST SERPL W P-5'-P-CCNC: 85 U/L (ref 9–39)
ATRIAL RATE: 88 BPM
AV PEAK GRADIENT: 18 MMHG
AVA (PEAK VEL): 2.62 CM2
AVA (VTI): 2.57 CM2
BACTERIA SPEC RESP CULT: NORMAL
BASO STIPL BLD QL SMEAR: PRESENT
BASOPHILS # BLD MANUAL: 0 X10*3/UL (ref 0–0.1)
BASOPHILS NFR BLD MANUAL: 0 %
BILIRUB SERPL-MCNC: 10.6 MG/DL (ref 0–1.2)
BLOOD EXPIRATION DATE: NORMAL
BUN SERPL-MCNC: 5 MG/DL (ref 6–23)
CALCIUM SERPL-MCNC: 9.2 MG/DL (ref 8.6–10.6)
CARDIAC TROPONIN I PNL SERPL HS: 13 NG/L (ref 0–53)
CHLORIDE SERPL-SCNC: 104 MMOL/L (ref 98–107)
CO2 SERPL-SCNC: 28 MMOL/L (ref 21–32)
CREAT SERPL-MCNC: 0.6 MG/DL (ref 0.5–1.3)
CRP SERPL-MCNC: 6.15 MG/DL
DISPENSE STATUS: NORMAL
EGFRCR SERPLBLD CKD-EPI 2021: >90 ML/MIN/1.73M*2
EJECTION FRACTION APICAL 4 CHAMBER: 61.8
EJECTION FRACTION: 63 %
EOSINOPHIL # BLD MANUAL: 0.25 X10*3/UL (ref 0–0.7)
EOSINOPHIL NFR BLD MANUAL: 2.4 %
ERYTHROCYTE [DISTWIDTH] IN BLOOD BY AUTOMATED COUNT: 24.8 % (ref 11.5–14.5)
ERYTHROCYTE [SEDIMENTATION RATE] IN BLOOD BY WESTERGREN METHOD: <1 MM/H (ref 0–15)
GLUCOSE SERPL-MCNC: 87 MG/DL (ref 74–99)
GRAM STN SPEC: NORMAL
GRAM STN SPEC: NORMAL
HCT VFR BLD AUTO: 20.3 % (ref 41–52)
HEMOGLOBIN A2: 4.1 % (ref 2–3.5)
HEMOGLOBIN A2: 4.3 % (ref 2–3.5)
HEMOGLOBIN F: 3 % (ref 0–2)
HEMOGLOBIN F: 3.1 % (ref 0–2)
HEMOGLOBIN IDENTIFICATION INTERPRETATION: ABNORMAL
HEMOGLOBIN IDENTIFICATION INTERPRETATION: ABNORMAL
HEMOGLOBIN S: 92.7 %
HEMOGLOBIN S: 92.8 %
HGB BLD-MCNC: 7.6 G/DL (ref 13.5–17.5)
HGB RETIC QN: 32 PG (ref 28–38)
IMM GRANULOCYTES # BLD AUTO: 0.07 X10*3/UL (ref 0–0.7)
IMM GRANULOCYTES NFR BLD AUTO: 0.7 % (ref 0–0.9)
IMMATURE RETIC FRACTION: 36.2 %
LDH SERPL L TO P-CCNC: 797 U/L (ref 84–246)
LEFT ATRIUM VOLUME AREA LENGTH INDEX BSA: 42.2 ML/M2
LEFT VENTRICLE INTERNAL DIMENSION DIASTOLE: 6.1 CM (ref 3.5–6)
LEFT VENTRICULAR OUTFLOW TRACT DIAMETER: 2.2 CM
LYMPHOCYTES # BLD MANUAL: 2.01 X10*3/UL (ref 1.2–4.8)
LYMPHOCYTES NFR BLD MANUAL: 19.5 %
MCH RBC QN AUTO: 32.2 PG (ref 26–34)
MCHC RBC AUTO-ENTMCNC: 37.4 G/DL (ref 32–36)
MCV RBC AUTO: 86 FL (ref 80–100)
MITRAL VALVE E/A RATIO: 1.58
MONOCYTES # BLD MANUAL: 0.75 X10*3/UL (ref 0.1–1)
MONOCYTES NFR BLD MANUAL: 7.3 %
NEUTS SEG # BLD MANUAL: 6.87 X10*3/UL (ref 1.2–7)
NEUTS SEG NFR BLD MANUAL: 66.7 %
NRBC BLD-RTO: 20 /100 WBCS (ref 0–0)
P AXIS: 46 DEGREES
P OFFSET: 173 MS
P ONSET: 141 MS
PATH REVIEW-HGB IDENTIFICATION: ABNORMAL
PATH REVIEW-HGB IDENTIFICATION: ABNORMAL
PLATELET # BLD AUTO: 210 X10*3/UL (ref 150–450)
POLYCHROMASIA BLD QL SMEAR: NORMAL
POTASSIUM SERPL-SCNC: 4 MMOL/L (ref 3.5–5.3)
PR INTERVAL: 150 MS
PRODUCT BLOOD TYPE: 1700
PRODUCT CODE: NORMAL
PROT SERPL-MCNC: 6.6 G/DL (ref 6.4–8.2)
Q ONSET: 216 MS
QRS COUNT: 15 BEATS
QRS DURATION: 104 MS
QT INTERVAL: 342 MS
QTC CALCULATION(BAZETT): 413 MS
QTC FREDERICIA: 388 MS
R AXIS: 91 DEGREES
RBC # BLD AUTO: 2.36 X10*6/UL (ref 4.5–5.9)
RBC MORPH BLD: NORMAL
RETICS #: 0.62 X10*6/UL (ref 0.02–0.12)
RETICS/RBC NFR AUTO: 26.3 % (ref 0.5–2)
RIGHT VENTRICLE FREE WALL PEAK S': 18 CM/S
SCHISTOCYTES BLD QL SMEAR: NORMAL
SICKLE CELLS BLD QL SMEAR: NORMAL
SODIUM SERPL-SCNC: 139 MMOL/L (ref 136–145)
T AXIS: -37 DEGREES
T OFFSET: 387 MS
TARGETS BLD QL SMEAR: NORMAL
TOTAL CELLS COUNTED BLD: 123
TRICUSPID ANNULAR PLANE SYSTOLIC EXCURSION: 2.6 CM
UNIT ABO: NORMAL
UNIT NUMBER: NORMAL
UNIT RH: NORMAL
UNIT VOLUME: 277
VARIANT LYMPHS # BLD MANUAL: 0.42 X10*3/UL (ref 0–0.5)
VARIANT LYMPHS NFR BLD: 4.1 %
VENTRICULAR RATE: 88 BPM
WBC # BLD AUTO: 10.3 X10*3/UL (ref 4.4–11.3)
XM INTEP: NORMAL

## 2025-05-06 PROCEDURE — 83615 LACTATE (LD) (LDH) ENZYME: CPT

## 2025-05-06 PROCEDURE — 2500000004 HC RX 250 GENERAL PHARMACY W/ HCPCS (ALT 636 FOR OP/ED): Mod: JZ,TB

## 2025-05-06 PROCEDURE — 84484 ASSAY OF TROPONIN QUANT: CPT

## 2025-05-06 PROCEDURE — 2500000004 HC RX 250 GENERAL PHARMACY W/ HCPCS (ALT 636 FOR OP/ED): Mod: JZ

## 2025-05-06 PROCEDURE — 99233 SBSQ HOSP IP/OBS HIGH 50: CPT

## 2025-05-06 PROCEDURE — 2500000001 HC RX 250 WO HCPCS SELF ADMINISTERED DRUGS (ALT 637 FOR MEDICARE OP): Performed by: PHARMACIST

## 2025-05-06 PROCEDURE — 82247 BILIRUBIN TOTAL: CPT

## 2025-05-06 PROCEDURE — 85007 BL SMEAR W/DIFF WBC COUNT: CPT

## 2025-05-06 PROCEDURE — 93306 TTE W/DOPPLER COMPLETE: CPT | Performed by: INTERNAL MEDICINE

## 2025-05-06 PROCEDURE — 85652 RBC SED RATE AUTOMATED: CPT

## 2025-05-06 PROCEDURE — 36415 COLL VENOUS BLD VENIPUNCTURE: CPT

## 2025-05-06 PROCEDURE — 93306 TTE W/DOPPLER COMPLETE: CPT

## 2025-05-06 PROCEDURE — 1200000003 HC ONCOLOGY  ROOM WITH TELEMETRY DAILY

## 2025-05-06 PROCEDURE — 2500000005 HC RX 250 GENERAL PHARMACY W/O HCPCS: Performed by: PHARMACIST

## 2025-05-06 PROCEDURE — 85027 COMPLETE CBC AUTOMATED: CPT

## 2025-05-06 PROCEDURE — 85045 AUTOMATED RETICULOCYTE COUNT: CPT

## 2025-05-06 PROCEDURE — 86140 C-REACTIVE PROTEIN: CPT

## 2025-05-06 RX ORDER — HYDROMORPHONE HYDROCHLORIDE 1 MG/ML
1 INJECTION, SOLUTION INTRAMUSCULAR; INTRAVENOUS; SUBCUTANEOUS ONCE
Status: COMPLETED | OUTPATIENT
Start: 2025-05-06 | End: 2025-05-06

## 2025-05-06 RX ADMIN — HYDROMORPHONE HYDROCHLORIDE 5.5 MG: 2 INJECTION, SOLUTION INTRAMUSCULAR; INTRAVENOUS; SUBCUTANEOUS at 04:57

## 2025-05-06 RX ADMIN — PANTOPRAZOLE SODIUM 40 MG: 40 TABLET, DELAYED RELEASE ORAL at 06:29

## 2025-05-06 RX ADMIN — HYDROMORPHONE HYDROCHLORIDE 1 MG: 1 INJECTION, SOLUTION INTRAMUSCULAR; INTRAVENOUS; SUBCUTANEOUS at 16:43

## 2025-05-06 RX ADMIN — HYDROMORPHONE HYDROCHLORIDE 5.5 MG: 2 INJECTION, SOLUTION INTRAMUSCULAR; INTRAVENOUS; SUBCUTANEOUS at 13:08

## 2025-05-06 RX ADMIN — HYDROMORPHONE HYDROCHLORIDE 6 MG: 2 INJECTION, SOLUTION INTRAMUSCULAR; INTRAVENOUS; SUBCUTANEOUS at 19:46

## 2025-05-06 RX ADMIN — SALINE NASAL SPRAY 1 SPRAY: 1.5 SOLUTION NASAL at 06:55

## 2025-05-06 RX ADMIN — HYDROMORPHONE HYDROCHLORIDE 6 MG: 2 INJECTION, SOLUTION INTRAMUSCULAR; INTRAVENOUS; SUBCUTANEOUS at 21:47

## 2025-05-06 RX ADMIN — HYDROMORPHONE HYDROCHLORIDE 5.5 MG: 2 INJECTION, SOLUTION INTRAMUSCULAR; INTRAVENOUS; SUBCUTANEOUS at 11:10

## 2025-05-06 RX ADMIN — HYDROMORPHONE HYDROCHLORIDE 5.5 MG: 2 INJECTION, SOLUTION INTRAMUSCULAR; INTRAVENOUS; SUBCUTANEOUS at 15:13

## 2025-05-06 RX ADMIN — KETOROLAC TROMETHAMINE 30 MG: 30 INJECTION, SOLUTION INTRAMUSCULAR; INTRAVENOUS at 00:31

## 2025-05-06 RX ADMIN — CYCLOBENZAPRINE 10 MG: 10 TABLET, FILM COATED ORAL at 11:23

## 2025-05-06 RX ADMIN — FOLIC ACID 1 MG: 1 TABLET ORAL at 11:22

## 2025-05-06 RX ADMIN — LIDOCAINE 4% 2 PATCH: 40 PATCH TOPICAL at 11:23

## 2025-05-06 RX ADMIN — HYDROMORPHONE HYDROCHLORIDE 1 MG: 1 INJECTION, SOLUTION INTRAMUSCULAR; INTRAVENOUS; SUBCUTANEOUS at 04:14

## 2025-05-06 RX ADMIN — HYDROMORPHONE HYDROCHLORIDE 5.5 MG: 2 INJECTION, SOLUTION INTRAMUSCULAR; INTRAVENOUS; SUBCUTANEOUS at 06:55

## 2025-05-06 RX ADMIN — HYDROMORPHONE HYDROCHLORIDE 6 MG: 2 INJECTION, SOLUTION INTRAMUSCULAR; INTRAVENOUS; SUBCUTANEOUS at 17:35

## 2025-05-06 RX ADMIN — DEXTROSE AND SODIUM CHLORIDE 75 ML/HR: 5; 450 INJECTION, SOLUTION INTRAVENOUS at 13:58

## 2025-05-06 RX ADMIN — HYDROMORPHONE HYDROCHLORIDE 5.5 MG: 2 INJECTION, SOLUTION INTRAMUSCULAR; INTRAVENOUS; SUBCUTANEOUS at 00:31

## 2025-05-06 RX ADMIN — CYCLOBENZAPRINE 10 MG: 10 TABLET, FILM COATED ORAL at 18:15

## 2025-05-06 RX ADMIN — HYDROMORPHONE HYDROCHLORIDE 6 MG: 2 INJECTION, SOLUTION INTRAMUSCULAR; INTRAVENOUS; SUBCUTANEOUS at 23:52

## 2025-05-06 RX ADMIN — CYCLOBENZAPRINE 10 MG: 10 TABLET, FILM COATED ORAL at 04:02

## 2025-05-06 RX ADMIN — KETOROLAC TROMETHAMINE 30 MG: 30 INJECTION, SOLUTION INTRAMUSCULAR; INTRAVENOUS at 06:30

## 2025-05-06 RX ADMIN — HYDROMORPHONE HYDROCHLORIDE 5.5 MG: 2 INJECTION, SOLUTION INTRAMUSCULAR; INTRAVENOUS; SUBCUTANEOUS at 02:46

## 2025-05-06 ASSESSMENT — PAIN SCALES - GENERAL
PAINLEVEL_OUTOF10: 9
PAINLEVEL_OUTOF10: 10 - WORST POSSIBLE PAIN
PAINLEVEL_OUTOF10: 8
PAINLEVEL_OUTOF10: 10 - WORST POSSIBLE PAIN
PAINLEVEL_OUTOF10: 9
PAINLEVEL_OUTOF10: 9
PAINLEVEL_OUTOF10: 10 - WORST POSSIBLE PAIN

## 2025-05-06 ASSESSMENT — PAIN - FUNCTIONAL ASSESSMENT
PAIN_FUNCTIONAL_ASSESSMENT: 0-10

## 2025-05-06 NOTE — CARE PLAN
Problem: Pain - Adult  Goal: Verbalizes/displays adequate comfort level or baseline comfort level  Outcome: Progressing     Problem: Safety - Adult  Goal: Free from fall injury  Outcome: Progressing     Problem: Discharge Planning  Goal: Discharge to home or other facility with appropriate resources  Outcome: Progressing     Problem: Chronic Conditions and Co-morbidities  Goal: Patient's chronic conditions and co-morbidity symptoms are monitored and maintained or improved  Outcome: Progressing     Problem: Nutrition  Goal: Nutrient intake appropriate for maintaining nutritional needs  Outcome: Progressing     Problem: Fall/Injury  Goal: Not fall by end of shift  Outcome: Progressing  Goal: Be free from injury by end of the shift  Outcome: Progressing  Goal: Verbalize understanding of personal risk factors for fall in the hospital  Outcome: Progressing  Goal: Verbalize understanding of risk factor reduction measures to prevent injury from fall in the home  Outcome: Progressing       The clinical goals for the shift include Pt will be safe and free from injury throughout shift.

## 2025-05-06 NOTE — PROGRESS NOTES
Joellen Narayan is a 24 y.o. male on day 3 of admission presenting with Sickle cell pain crisis (Multi).    Subjective   Joellen is doing fine this morning. Still reporting R arm pain and swelling. We discussed prelim DVT US results as neg. Pt agreeable to MRI today. We discussed fall overnight. Pt states around 2-3 am in the morning he tripped over a cord and fell on his left knee and arm. Pt only reported this fall until 7am this morning. Pt states he did not lose consciousness, denies changes in vision, dizziness, denies hitting his head. We discussed the importance of reporting falls to staff when they happen for the safety of the patient. Bed alarm was placed by nursing staff. Denies chest pain, SOB, N/V/D/C, fever, chills. ROS: A complete review of systems was performed and is negative except for as mentioned above in the HPI     Objective     Physical Exam  Constitutional:       Appearance: He is ill-appearing.   HENT:      Head: Normocephalic.      Right Ear: External ear normal.      Left Ear: External ear normal.      Nose: Nose normal.   Eyes:      Extraocular Movements: Extraocular movements intact.      Conjunctiva/sclera: Conjunctivae normal.   Cardiovascular:      Rate and Rhythm: Normal rate and regular rhythm.   Pulmonary:      Effort: Pulmonary effort is normal.      Breath sounds: Normal breath sounds.   Abdominal:      General: Bowel sounds are normal.      Palpations: Abdomen is soft.   Musculoskeletal:      Cervical back: Normal range of motion.      Comments: + R arm swelling, no erythema   Skin:     General: Skin is warm and dry.   Neurological:      Mental Status: He is oriented to person, place, and time. Mental status is at baseline.   Psychiatric:         Mood and Affect: Mood normal.         Behavior: Behavior normal.         Thought Content: Thought content normal.         Last Recorded Vitals  Blood pressure 129/78, pulse 64, temperature 36.3 °C (97.3 °F), temperature source Temporal,  "resp. rate 16, height 1.88 m (6' 2\"), weight 72.6 kg (160 lb), SpO2 97%.  Intake/Output last 3 Shifts:  I/O last 3 completed shifts:  In: - (0 mL/kg)   Out: 300 (4.1 mL/kg) [Urine:300 (0.2 mL/kg/hr)]  Weight: 72.6 kg     Relevant Results  .Scheduled medications  Scheduled Medications[1]  Continuous medications  Continuous Medications[2]  PRN medications  PRN Medications[3]    .  Results for orders placed or performed during the hospital encounter of 05/03/25 (from the past 24 hours)   Prepare RBC: 1 Units, Leukocytes Reduced (CMV reduced risk)   Result Value Ref Range    PRODUCT CODE E5135G99     Unit Number [id number]     Unit ABO B     Unit RH NEG     XM INTEP COMP     Dispense Status IS     Blood Expiration Date 5/20/2025 11:59:00 PM EDT     PRODUCT BLOOD TYPE 1700     UNIT VOLUME 277    C-reactive protein   Result Value Ref Range    C-Reactive Protein 7.85 (H) <1.00 mg/dL   Sedimentation rate, automated   Result Value Ref Range    Sedimentation Rate <1 0 - 15 mm/h   Vascular US upper extremity venous duplex right   Result Value Ref Range    BSA 1.95 m2   Sars-CoV-2 and Influenza A/B PCR   Result Value Ref Range    Flu A Result Not Detected Not Detected    Flu B Result Not Detected Not Detected    Coronavirus 2019, PCR Not Detected Not Detected     *Note: Due to a large number of results and/or encounters for the requested time period, some results have not been displayed. A complete set of results can be found in Results Review.       Assessment & Plan  Sickle cell pain crisis (Multi)    [facility] 24 y.o. 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 Patient initially 98% on room air, however became hypoxic in ED requiring 2-4 liters, unclear etiology for hypoxia at this time, possibly related to poor inspiratory effort in setting of severe pain vs ACS (low suspicion at this time, however close monitoring) vs infectious process. CXR w/o evidence of acute process. CT PE negative, similar GGOs from prior scans unchanged. Pt asymptomatic, denies respiratory s/s. S/p Zosyn, held empiric atbx 5/4. 5/5 ECHO and VERÓNICA US pending. 5/6 Pt agreeable to MRI R arm, pending.     Update 5/6:  - echo pending  - RUE US prelim neg for DVT  - MRI R arm pending for continued R arm pain, patient agreeable  - c/w current pain reg    # R arm pain  - original etiology sickle cell related vs AVN vs osteomyelitis vs thrombus   - does not radiate to arm, jaw, chest   - no imaging obtained on admit, pt states acute pain feels like sickle cell pain   - Jan 2025 there was > hx of OM of lower extremities therefore low threshold for MRI  - 5/3 EKG NSR, no ST elevation appears similar to prior   - ESR/ CRP <1/5.16 (5/4) --> <1/7.85 (5/5) --> <1, 6.15 (5/6)  - 5/5 offered MRI R arm pt declining at this time even when offered with premedication, agreeable to VERÓNICA US in interim  - prelim read of RUE US neg for DVT (5/5)  - Pt now agreeable to MRI R arm, pending (5/6)  - increased to 5.5mg IVP dilaudid q2 PRN (5/5), offered dPC pt declining at this time as well     # Hgb SS disease   # hx acute chest syndrome   - OARRS reviewed on admission, no aberrancies noted   - Hgb baseline: 9-10, 8.1 on admission, (5/5) Hg 7.5, in setting of worsening hemolysis and pain, s/p 1u pRBC --> Hgb 5/6 7.6  - T.bili: 9.5 LDH: 779 Retic: 13.1  - Admission, s/p initiating 4mg dilaudid q2h prn, IV toradol 15mg q6h x 3 days, lidocaine patch, flexeril as needed, tylenol   - c/w home folic acid daily  - utox pending   - Exchange labs resulted 5/2-5/5  - 5/4 Hg S 92.8%  -  started 5.5mg IVP dilaudid q2 PRN severe pain (5/5-current)   - bowel regimen for opioid induced constipation, zofran for opioid induced nausea, and benadrly for opioid induced pruritis      # AHRF, requiring 2-4L on admission  - On 2-3L at night, however patient reports daytime hypoxia as well in the past, currently on 4L 5/6  - unclear at this time, etiology includes acute chest vs poor inspiratory effort in setting of pain vs pneumonia vs PE vs pulm htn  - 5/3 Cxr w/o evidence of acute process   - 5/3 CT PE w/o evidence of PE, similar GGOs from prior likely atelectasis (similar to Dec 2024)   - strep pneum, legionella negative 5/4   - 5/5 ECHO pending   - trop 13 (5/5)   - 5/5 repeat CXR neg for acute process  - c/w continuous pulse oximetry  - Continue supportive O2 with NC  - 5/5 EKG NSR  - Flu A/B, covid neg (5/5)  - respx cx 5/4 negative thus far  - low c/f infectious process at this time, empirix atbx discontinued 5/4 per Dr. Correia      # Hx Priapism  - no active issues on admit   - hx previously drained by urology   - c/w home sudafed PRN     # Nodular lymphocyte predominant Hodgkins lymphoma (NLPHL)    - Follows with Dr. Stoll  - s/p Rituxan and prednisone q3 weeks (C1 1/18/24, C2 2/8/24, Missed C3 d/t sickle cell pain crisis, C4 4/4/24, C5 5/16/24, C6 6/7/24)   - 4/9 PET showed interval development of new FDG focus in mid abdomen  - will need onc appt scheduled prior to discharge      # Hx of nocturnal oxygen dependence  - baseline 2-3 L overnight  - Pulm outpatient appt scheduled back in February  - Will need pulm outpatient scheduling     # prophy  - Enoxaparin   - SCDs, encouraged ambulation     # dispo  - Code Status: Full Code (confirmed on admission)   - DC home resumed O2 pending improvement in pain and O2 workup   - NOK:  Primary Emergency Contact: Melissa Narayan, Home Phone: [phone] - fuv 7/9 sickle cell clinic     I spent 60 minutes in the professional and overall care of this  patient.    Assessment and plan as above discussed with attending physician Dr. Parnell.    Nikki Couch PA-C         [1] diphenhydrAMINE, 25 mg, oral, Once  enoxaparin, 40 mg, subcutaneous, Daily  folic acid, 1 mg, oral, Daily  lidocaine, 2 patch, transdermal, Daily  polyethylene glycol, 17 g, oral, Daily  sodium chloride, 1 spray, Each Nostril, 4x daily     [2] dextrose 5%-0.45 % sodium chloride, 75 mL/hr, Last Rate: 75 mL/hr (05/05/25 1604)     [3] PRN medications: cyclobenzaprine, diphenhydrAMINE, HYDROmorphone, naloxone, ondansetron, pseudoephedrine, sennosides-docusate sodium, sodium chloride

## 2025-05-07 ENCOUNTER — APPOINTMENT (OUTPATIENT)
Dept: RADIOLOGY | Facility: HOSPITAL | Age: 25
DRG: 981 | End: 2025-05-07
Payer: COMMERCIAL

## 2025-05-07 LAB
ALBUMIN SERPL BCP-MCNC: 4.5 G/DL (ref 3.4–5)
ALP SERPL-CCNC: 120 U/L (ref 33–120)
ALT SERPL W P-5'-P-CCNC: 43 U/L (ref 10–52)
ANION GAP SERPL CALC-SCNC: 15 MMOL/L (ref 10–20)
AST SERPL W P-5'-P-CCNC: 84 U/L (ref 9–39)
BASOPHILS # BLD MANUAL: 0.1 X10*3/UL (ref 0–0.1)
BASOPHILS NFR BLD MANUAL: 0.8 %
BILIRUB SERPL-MCNC: 11 MG/DL (ref 0–1.2)
BUN SERPL-MCNC: 5 MG/DL (ref 6–23)
CALCIUM SERPL-MCNC: 9.4 MG/DL (ref 8.6–10.6)
CHLORIDE SERPL-SCNC: 98 MMOL/L (ref 98–107)
CO2 SERPL-SCNC: 27 MMOL/L (ref 21–32)
CREAT SERPL-MCNC: 0.59 MG/DL (ref 0.5–1.3)
EGFRCR SERPLBLD CKD-EPI 2021: >90 ML/MIN/1.73M*2
EOSINOPHIL # BLD MANUAL: 0.21 X10*3/UL (ref 0–0.7)
EOSINOPHIL NFR BLD MANUAL: 1.7 %
ERYTHROCYTE [DISTWIDTH] IN BLOOD BY AUTOMATED COUNT: 23.9 % (ref 11.5–14.5)
GLUCOSE SERPL-MCNC: 76 MG/DL (ref 74–99)
HCT VFR BLD AUTO: 22.7 % (ref 41–52)
HGB BLD-MCNC: 8.6 G/DL (ref 13.5–17.5)
HGB RETIC QN: 34 PG (ref 28–38)
IMM GRANULOCYTES # BLD AUTO: 0.1 X10*3/UL (ref 0–0.7)
IMM GRANULOCYTES NFR BLD AUTO: 0.8 % (ref 0–0.9)
IMMATURE RETIC FRACTION: 36.9 %
LDH SERPL L TO P-CCNC: 799 U/L (ref 84–246)
LYMPHOCYTES # BLD MANUAL: 1.25 X10*3/UL (ref 1.2–4.8)
LYMPHOCYTES NFR BLD MANUAL: 10.1 %
MCH RBC QN AUTO: 32.2 PG (ref 26–34)
MCHC RBC AUTO-ENTMCNC: 37.9 G/DL (ref 32–36)
MCV RBC AUTO: 85 FL (ref 80–100)
MONOCYTES # BLD MANUAL: 1.25 X10*3/UL (ref 0.1–1)
MONOCYTES NFR BLD MANUAL: 10.1 %
NEUTROPHILS # BLD MANUAL: 9.49 X10*3/UL (ref 1.2–7.7)
NEUTS BAND # BLD MANUAL: 0.21 X10*3/UL (ref 0–0.7)
NEUTS BAND NFR BLD MANUAL: 1.7 %
NEUTS SEG # BLD MANUAL: 9.28 X10*3/UL (ref 1.2–7)
NEUTS SEG NFR BLD MANUAL: 74.8 %
NRBC BLD-RTO: 13.4 /100 WBCS (ref 0–0)
PLATELET # BLD AUTO: 271 X10*3/UL (ref 150–450)
POLYCHROMASIA BLD QL SMEAR: ABNORMAL
POTASSIUM SERPL-SCNC: 3.6 MMOL/L (ref 3.5–5.3)
PROMYELOCYTES # BLD MANUAL: 0.1 X10*3/UL
PROMYELOCYTES NFR BLD MANUAL: 0.8 %
PROT SERPL-MCNC: 7 G/DL (ref 6.4–8.2)
RBC # BLD AUTO: 2.67 X10*6/UL (ref 4.5–5.9)
RBC MORPH BLD: ABNORMAL
RETICS #: 0.47 X10*6/UL (ref 0.02–0.12)
RETICS/RBC NFR AUTO: 17.7 % (ref 0.5–2)
SICKLE CELLS BLD QL SMEAR: ABNORMAL
SODIUM SERPL-SCNC: 136 MMOL/L (ref 136–145)
TARGETS BLD QL SMEAR: ABNORMAL
TOTAL CELLS COUNTED BLD: 119
WBC # BLD AUTO: 12.4 X10*3/UL (ref 4.4–11.3)

## 2025-05-07 PROCEDURE — 73090 X-RAY EXAM OF FOREARM: CPT | Mod: RT

## 2025-05-07 PROCEDURE — RXMED WILLOW AMBULATORY MEDICATION CHARGE

## 2025-05-07 PROCEDURE — 73070 X-RAY EXAM OF ELBOW: CPT | Mod: RT

## 2025-05-07 PROCEDURE — 2500000004 HC RX 250 GENERAL PHARMACY W/ HCPCS (ALT 636 FOR OP/ED): Mod: JZ,TB

## 2025-05-07 PROCEDURE — 85027 COMPLETE CBC AUTOMATED: CPT

## 2025-05-07 PROCEDURE — 85007 BL SMEAR W/DIFF WBC COUNT: CPT

## 2025-05-07 PROCEDURE — 73090 X-RAY EXAM OF FOREARM: CPT | Mod: RIGHT SIDE | Performed by: RADIOLOGY

## 2025-05-07 PROCEDURE — 73070 X-RAY EXAM OF ELBOW: CPT | Mod: RIGHT SIDE | Performed by: RADIOLOGY

## 2025-05-07 PROCEDURE — 99233 SBSQ HOSP IP/OBS HIGH 50: CPT

## 2025-05-07 PROCEDURE — 73218 MRI UPPER EXTREMITY W/O DYE: CPT | Mod: RT,RSC

## 2025-05-07 PROCEDURE — 73030 X-RAY EXAM OF SHOULDER: CPT | Mod: RIGHT SIDE | Performed by: RADIOLOGY

## 2025-05-07 PROCEDURE — 73030 X-RAY EXAM OF SHOULDER: CPT | Mod: RT

## 2025-05-07 PROCEDURE — 83615 LACTATE (LD) (LDH) ENZYME: CPT

## 2025-05-07 PROCEDURE — 36415 COLL VENOUS BLD VENIPUNCTURE: CPT

## 2025-05-07 PROCEDURE — 2500000001 HC RX 250 WO HCPCS SELF ADMINISTERED DRUGS (ALT 637 FOR MEDICARE OP): Performed by: PHARMACIST

## 2025-05-07 PROCEDURE — 73060 X-RAY EXAM OF HUMERUS: CPT | Mod: RT

## 2025-05-07 PROCEDURE — 73060 X-RAY EXAM OF HUMERUS: CPT | Mod: RIGHT SIDE | Performed by: RADIOLOGY

## 2025-05-07 PROCEDURE — 85045 AUTOMATED RETICULOCYTE COUNT: CPT

## 2025-05-07 PROCEDURE — 1170000001 HC PRIVATE ONCOLOGY ROOM DAILY

## 2025-05-07 PROCEDURE — 2500000005 HC RX 250 GENERAL PHARMACY W/O HCPCS: Performed by: PHARMACIST

## 2025-05-07 PROCEDURE — 2500000004 HC RX 250 GENERAL PHARMACY W/ HCPCS (ALT 636 FOR OP/ED): Performed by: PHARMACIST

## 2025-05-07 PROCEDURE — 80053 COMPREHEN METABOLIC PANEL: CPT

## 2025-05-07 RX ORDER — LIDOCAINE 560 MG/1
2 PATCH PERCUTANEOUS; TOPICAL; TRANSDERMAL DAILY
Qty: 15 PATCH | Refills: 1 | Status: ON HOLD | OUTPATIENT
Start: 2025-05-08 | End: 2025-05-20

## 2025-05-07 RX ADMIN — HYDROMORPHONE HYDROCHLORIDE 6 MG: 2 INJECTION, SOLUTION INTRAMUSCULAR; INTRAVENOUS; SUBCUTANEOUS at 08:11

## 2025-05-07 RX ADMIN — LIDOCAINE 4% 2 PATCH: 40 PATCH TOPICAL at 08:11

## 2025-05-07 RX ADMIN — HYDROMORPHONE HYDROCHLORIDE 6 MG: 2 INJECTION, SOLUTION INTRAMUSCULAR; INTRAVENOUS; SUBCUTANEOUS at 15:03

## 2025-05-07 RX ADMIN — HYDROMORPHONE HYDROCHLORIDE 6 MG: 2 INJECTION, SOLUTION INTRAMUSCULAR; INTRAVENOUS; SUBCUTANEOUS at 05:52

## 2025-05-07 RX ADMIN — HYDROMORPHONE HYDROCHLORIDE 6 MG: 2 INJECTION, SOLUTION INTRAMUSCULAR; INTRAVENOUS; SUBCUTANEOUS at 03:54

## 2025-05-07 RX ADMIN — HYDROMORPHONE HYDROCHLORIDE 6 MG: 2 INJECTION, SOLUTION INTRAMUSCULAR; INTRAVENOUS; SUBCUTANEOUS at 20:52

## 2025-05-07 RX ADMIN — SALINE NASAL SPRAY 1 SPRAY: 1.5 SOLUTION NASAL at 15:03

## 2025-05-07 RX ADMIN — SALINE NASAL SPRAY 1 SPRAY: 1.5 SOLUTION NASAL at 18:05

## 2025-05-07 RX ADMIN — HYDROMORPHONE HYDROCHLORIDE 6 MG: 2 INJECTION, SOLUTION INTRAMUSCULAR; INTRAVENOUS; SUBCUTANEOUS at 18:01

## 2025-05-07 RX ADMIN — FOLIC ACID 1 MG: 1 TABLET ORAL at 08:11

## 2025-05-07 RX ADMIN — HYDROMORPHONE HYDROCHLORIDE 6 MG: 2 INJECTION, SOLUTION INTRAMUSCULAR; INTRAVENOUS; SUBCUTANEOUS at 01:54

## 2025-05-07 RX ADMIN — SALINE NASAL SPRAY 1 SPRAY: 1.5 SOLUTION NASAL at 06:45

## 2025-05-07 RX ADMIN — CYCLOBENZAPRINE 10 MG: 10 TABLET, FILM COATED ORAL at 21:03

## 2025-05-07 RX ADMIN — HYDROMORPHONE HYDROCHLORIDE 6 MG: 2 INJECTION, SOLUTION INTRAMUSCULAR; INTRAVENOUS; SUBCUTANEOUS at 12:23

## 2025-05-07 RX ADMIN — HYDROMORPHONE HYDROCHLORIDE 6 MG: 2 INJECTION, SOLUTION INTRAMUSCULAR; INTRAVENOUS; SUBCUTANEOUS at 22:58

## 2025-05-07 RX ADMIN — POLYETHYLENE GLYCOL 3350 17 G: 17 POWDER, FOR SOLUTION ORAL at 08:11

## 2025-05-07 RX ADMIN — HYDROMORPHONE HYDROCHLORIDE 6 MG: 2 INJECTION, SOLUTION INTRAMUSCULAR; INTRAVENOUS; SUBCUTANEOUS at 09:54

## 2025-05-07 ASSESSMENT — PAIN - FUNCTIONAL ASSESSMENT
PAIN_FUNCTIONAL_ASSESSMENT: 0-10

## 2025-05-07 ASSESSMENT — COGNITIVE AND FUNCTIONAL STATUS - GENERAL
MOBILITY SCORE: 24
MOBILITY SCORE: 24
DAILY ACTIVITIY SCORE: 24
DAILY ACTIVITIY SCORE: 24

## 2025-05-07 ASSESSMENT — PAIN SCALES - GENERAL
PAINLEVEL_OUTOF10: 8
PAINLEVEL_OUTOF10: 9
PAINLEVEL_OUTOF10: 8
PAINLEVEL_OUTOF10: 9
PAINLEVEL_OUTOF10: 10 - WORST POSSIBLE PAIN
PAINLEVEL_OUTOF10: 8
PAINLEVEL_OUTOF10: 10 - WORST POSSIBLE PAIN
PAINLEVEL_OUTOF10: 9
PAINLEVEL_OUTOF10: 10 - WORST POSSIBLE PAIN
PAINLEVEL_OUTOF10: 10 - WORST POSSIBLE PAIN
PAINLEVEL_OUTOF10: 9
PAINLEVEL_OUTOF10: 10 - WORST POSSIBLE PAIN
PAINLEVEL_OUTOF10: 9
PAINLEVEL_OUTOF10: 9

## 2025-05-07 ASSESSMENT — PAIN DESCRIPTION - ORIENTATION: ORIENTATION: RIGHT

## 2025-05-07 ASSESSMENT — PAIN DESCRIPTION - LOCATION: LOCATION: ARM

## 2025-05-07 NOTE — CARE PLAN
The clinical goals for the shift include pt will remain free of falls during my shift    Problem: Pain - Adult  Goal: Verbalizes/displays adequate comfort level or baseline comfort level  Outcome: Progressing     Problem: Safety - Adult  Goal: Free from fall injury  Outcome: Progressing     Problem: Discharge Planning  Goal: Discharge to home or other facility with appropriate resources  Outcome: Progressing     Problem: Chronic Conditions and Co-morbidities  Goal: Patient's chronic conditions and co-morbidity symptoms are monitored and maintained or improved  Outcome: Progressing

## 2025-05-07 NOTE — SIGNIFICANT EVENT
Patient's telemetry strip completed w/ oncoming/off-going RN.   Strip reflected in the flowsheets and hard copy placed in patient's chart.     Patient's telemetry batteries changed.  Telemetry monitor turned back on and functioning appropriately.    Archana Hwang, RN

## 2025-05-07 NOTE — NURSING NOTE
Pt given 6 mg IV Dilauded for pain 9/10 per pt, prior to MRI.  Ok to administer early if needed for MRI - per V/O [facility] team.  Per pt, O2 is HS and pt to MRI on RA 96% Sat.  Tele removed and placed on Standby.    CAROLYN Florentino BSN, RN

## 2025-05-07 NOTE — CARE PLAN
The patient's goals for the shift include      The clinical goals for the shift include Pt will maintain O2 sats >93 through shift 5/7/25 @ 1900      Problem: Pain - Adult  Goal: Verbalizes/displays adequate comfort level or baseline comfort level  Outcome: Progressing     Problem: Safety - Adult  Goal: Free from fall injury  Outcome: Progressing     Problem: Discharge Planning  Goal: Discharge to home or other facility with appropriate resources  Outcome: Progressing     Problem: Chronic Conditions and Co-morbidities  Goal: Patient's chronic conditions and co-morbidity symptoms are monitored and maintained or improved  Outcome: Progressing     Problem: Nutrition  Goal: Nutrient intake appropriate for maintaining nutritional needs  Outcome: Progressing     Problem: Fall/Injury  Goal: Not fall by end of shift  Outcome: Progressing  Goal: Be free from injury by end of the shift  Outcome: Progressing  Goal: Verbalize understanding of personal risk factors for fall in the hospital  Outcome: Progressing  Goal: Verbalize understanding of risk factor reduction measures to prevent injury from fall in the home  Outcome: Progressing

## 2025-05-07 NOTE — PROGRESS NOTES
"Joellen Narayan is a 24 y.o. male on day 4 of admission presenting with Sickle cell pain crisis (Multi).    Subjective   Joellen is doing fine this morning. Still reporting R arm pain. We discussed plan for MRI R arm today. Pain meds were increased yesterday evening and patient states this has helped with the pain. Denies chest pain, SOB, N/V/D/C, fever, chills. ROS: A complete review of systems was performed and is negative except for as mentioned above in the HPI     Objective     Physical Exam  Constitutional:       Appearance: He is ill-appearing.   HENT:      Head: Normocephalic.      Right Ear: External ear normal.      Left Ear: External ear normal.      Nose: Nose normal.   Eyes:      Extraocular Movements: Extraocular movements intact.      Conjunctiva/sclera: Conjunctivae normal.   Cardiovascular:      Rate and Rhythm: Normal rate and regular rhythm.   Pulmonary:      Effort: Pulmonary effort is normal.      Breath sounds: Normal breath sounds.   Abdominal:      General: Bowel sounds are normal.      Palpations: Abdomen is soft.   Musculoskeletal:      Cervical back: Normal range of motion.      Comments: + R arm swelling, no erythema   Skin:     General: Skin is warm and dry.   Neurological:      Mental Status: He is oriented to person, place, and time. Mental status is at baseline.   Psychiatric:         Mood and Affect: Mood normal.         Behavior: Behavior normal.         Thought Content: Thought content normal.         Last Recorded Vitals  Blood pressure 157/85, pulse 86, temperature 36.7 °C (98.1 °F), temperature source Temporal, resp. rate 18, height 1.88 m (6' 2\"), weight 72.6 kg (160 lb), SpO2 94%.  Intake/Output last 3 Shifts:  I/O last 3 completed shifts:  In: 240 (3.3 mL/kg) [P.O.:240]  Out: 1750 (24.1 mL/kg) [Urine:1750 (1 mL/kg/hr)]  Weight: 72.6 kg     Relevant Results  .Scheduled medications  Scheduled Medications[1]  Continuous medications  Continuous Medications[2]  PRN " medications  PRN Medications[3]    .  Results for orders placed or performed during the hospital encounter of 05/03/25 (from the past 24 hours)   Transthoracic Echo Complete   Result Value Ref Range    AV pk delano 2.12 m/s    AV mn grad 9 mmHg    LVOT diam 2.20 cm    MV E/A ratio 1.58     LA vol index A/L 42.2 ml/m2    Tricuspid annular plane systolic excursion 2.6 cm    LV EF 63 %    RV free wall pk S' 18.00 cm/s    LVIDd 6.10 cm    Aortic Valve Area by Continuity of VTI 2.57 cm2    Aortic Valve Area by Continuity of Peak Velocity 2.62 cm2    AV pk grad 18 mmHg    LV A4C EF 61.8    CBC and Auto Differential   Result Value Ref Range    WBC 12.4 (H) 4.4 - 11.3 x10*3/uL    nRBC 13.4 (H) 0.0 - 0.0 /100 WBCs    RBC 2.67 (L) 4.50 - 5.90 x10*6/uL    Hemoglobin 8.6 (L) 13.5 - 17.5 g/dL    Hematocrit 22.7 (L) 41.0 - 52.0 %    MCV 85 80 - 100 fL    MCH 32.2 26.0 - 34.0 pg    MCHC 37.9 (H) 32.0 - 36.0 g/dL    RDW 23.9 (H) 11.5 - 14.5 %    Platelets 271 150 - 450 x10*3/uL    Immature Granulocytes %, Automated 0.8 0.0 - 0.9 %    Immature Granulocytes Absolute, Automated 0.10 0.00 - 0.70 x10*3/uL   Comprehensive metabolic panel   Result Value Ref Range    Glucose 76 74 - 99 mg/dL    Sodium 136 136 - 145 mmol/L    Potassium 3.6 3.5 - 5.3 mmol/L    Chloride 98 98 - 107 mmol/L    Bicarbonate 27 21 - 32 mmol/L    Anion Gap 15 10 - 20 mmol/L    Urea Nitrogen 5 (L) 6 - 23 mg/dL    Creatinine 0.59 0.50 - 1.30 mg/dL    eGFR >90 >60 mL/min/1.73m*2    Calcium 9.4 8.6 - 10.6 mg/dL    Albumin 4.5 3.4 - 5.0 g/dL    Alkaline Phosphatase 120 33 - 120 U/L    Total Protein 7.0 6.4 - 8.2 g/dL    AST 84 (H) 9 - 39 U/L    Bilirubin, Total 11.0 (H) 0.0 - 1.2 mg/dL    ALT 43 10 - 52 U/L   Reticulocytes   Result Value Ref Range    Retic % 17.7 (H) 0.5 - 2.0 %    Retic Absolute 0.473 (H) 0.022 - 0.118 x10*6/uL    Reticulocyte Hemoglobin 34 28 - 38 pg    Immature Retic fraction 36.9 (H) <=16.0 %   Lactate dehydrogenase   Result Value Ref Range      (H) 84 - 246 U/L   Manual Differential   Result Value Ref Range    Neutrophils %, Manual 74.8 40.0 - 80.0 %    Bands %, Manual 1.7 0.0 - 5.0 %    Lymphocytes %, Manual 10.1 13.0 - 44.0 %    Monocytes %, Manual 10.1 2.0 - 10.0 %    Eosinophils %, Manual 1.7 0.0 - 6.0 %    Basophils %, Manual 0.8 0.0 - 2.0 %    Promyelocytes %, Manual 0.8 0.0 - 0.0 %    Seg Neutrophils Absolute, Manual 9.28 (H) 1.20 - 7.00 x10*3/uL    Bands Absolute, Manual 0.21 0.00 - 0.70 x10*3/uL    Lymphocytes Absolute, Manual 1.25 1.20 - 4.80 x10*3/uL    Monocytes Absolute, Manual 1.25 (H) 0.10 - 1.00 x10*3/uL    Eosinophils Absolute, Manual 0.21 0.00 - 0.70 x10*3/uL    Basophils Absolute, Manual 0.10 0.00 - 0.10 x10*3/uL    Promyelocytes Absolute, Manual 0.10 0.00 - 0.00 x10*3/uL    Total Cells Counted 119     Neutrophils Absolute, Manual 9.49 (H) 1.20 - 7.70 x10*3/uL    RBC Morphology See Below     Polychromasia Mild     Sickle Cells Many     Target Cells Few      *Note: Due to a large number of results and/or encounters for the requested time period, some results have not been displayed. A complete set of results can be found in Results Review.       Assessment & Plan  Sickle cell pain crisis (Multi)    [facility] 24 y.o. 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 Patient initially 98% on room air, however became hypoxic in ED requiring 2-4 liters, unclear etiology for hypoxia at this time, possibly related to poor inspiratory effort in setting of severe pain vs ACS (low suspicion at this time, however close monitoring) vs infectious  process. CXR w/o evidence of acute process. CT PE negative, similar GGOs from prior scans unchanged. Pt asymptomatic, denies respiratory s/s. S/p Zosyn, held empiric atbx 5/4. 5/5 ECHO EF 63%and VERÓNICA US neg for DVT. 5/6 Pt agreeable to MRI R arm, pending.     Update 5/6:  - RUE US neg for DVT  - MRI R arm pending for continued R arm pain, patient agreeable  - IV dilaudid increased to 6mg q2h PRN for severe pain    # R arm pain  - original etiology sickle cell related vs AVN vs osteomyelitis vs thrombus   - does not radiate to arm, jaw, chest   - no imaging obtained on admit, pt states acute pain feels like sickle cell pain   - Jan 2025 there was > hx of OM of lower extremities therefore low threshold for MRI  - 5/3 EKG NSR, no ST elevation appears similar to prior   - ESR/ CRP <1/5.16 (5/4) --> <1/7.85 (5/5) --> <1, 6.15 (5/6)  - 5/5 offered MRI R arm pt declining at this time even when offered with premedication, agreeable to VERÓNICA US in interim  - RUE US neg for DVT (5/5)  - Pt now agreeable to MRI R arm, pending (5/6)  - s/p increased to 5.5mg IVP dilaudid q2 PRN (5/5), offered dPC pt declining at this time as well   - increased to 6mg IVP dilaudid q2h prn for severe pain (5/6-current)    # Hgb SS disease   # hx acute chest syndrome   - OARRS reviewed on admission, no aberrancies noted   - Hgb baseline: 9-10, 8.1 on admission, (5/5) Hg 7.5, in setting of worsening hemolysis and pain, s/p 1u pRBC --> Hgb 7.6 (5/6) --> Hgb 8.6 (5/7)  - T.bili: 9.5 LDH: 779 Retic: 13.1 --> (5/7) tbili 11, , retic 17.7%  - leukocytosis noted (5/7) WBC 12.4, likely reactive as pt afebrile with no other s/sx of infectious process  - Admission, s/p initiating 4mg dilaudid q2h prn, IV toradol 15mg q6h x 3 days, lidocaine patch, flexeril as needed, tylenol   - c/w home folic acid daily  - utox pending   - Exchange labs resulted 5/2-5/5  - 5/4 Hg S 92.8%  - s/p 5.5mg IVP dilaudid q2 PRN severe pain (5/5-5/6)   - start 6mg IV dilaudid  q2h PRN for severe pain (5/6-current)  - bowel regimen for opioid induced constipation, zofran for opioid induced nausea, and benadrly for opioid induced pruritis      # AHRF, requiring 2-4L on admission  - On 2-3L at night, however patient reports daytime hypoxia as well in the past, currently on 4L 5/6  - unclear at this time, etiology includes acute chest vs poor inspiratory effort in setting of pain vs pneumonia vs PE vs pulm htn  - 5/3 Cxr w/o evidence of acute process   - 5/3 CT PE w/o evidence of PE, similar GGOs from prior likely atelectasis (similar to Dec 2024)   - strep pneum, legionella negative 5/4   - 5/5 ECHO EF 63%, LV severely dilated   - trop 13 (5/5)   - 5/5 repeat CXR neg for acute process  - c/w continuous pulse oximetry  - Continue supportive O2 with NC  - 5/5 EKG NSR  - Flu A/B, covid neg (5/5)  - respx cx 5/4 negative thus far  - low c/f infectious process at this time, empirix atbx discontinued 5/4 per Dr. Croreia      # Hx Priapism  - no active issues on admit   - hx previously drained by urology   - c/w home sudafed PRN     # Nodular lymphocyte predominant Hodgkins lymphoma (NLPHL)    - Follows with Dr. Stoll  - s/p Rituxan and prednisone q3 weeks (C1 1/18/24, C2 2/8/24, Missed C3 d/t sickle cell pain crisis, C4 4/4/24, C5 5/16/24, C6 6/7/24)   - 4/9 PET showed interval development of new FDG focus in mid abdomen  - will need onc appt scheduled prior to discharge      # Hx of nocturnal oxygen dependence  - baseline 2-3 L overnight  - Pulm outpatient appt scheduled back in February  - Will need pulm outpatient scheduling     # prophy  - Enoxaparin   - SCDs, encouraged ambulation     # dispo  - Code Status: Full Code (confirmed on admission)   - DC home resumed O2 pending improvement in pain and O2 workup   - NOK:  Primary Emergency Contact: Melissa Narayan, Home Phone: [phone]  - FUV 7/9 sickle cell clinic, onc & pulm FUV requested    I spent 60 minutes in the professional and  overall care of this patient.    Assessment and plan as above discussed with attending physician Dr. Parnell.    Nikki Couch PA-C         [1] diphenhydrAMINE, 25 mg, oral, Once  enoxaparin, 40 mg, subcutaneous, Daily  folic acid, 1 mg, oral, Daily  lidocaine, 2 patch, transdermal, Daily  polyethylene glycol, 17 g, oral, Daily  sodium chloride, 1 spray, Each Nostril, 4x daily     [2]    [3] PRN medications: cyclobenzaprine, diphenhydrAMINE, HYDROmorphone, ondansetron, pseudoephedrine, sennosides-docusate sodium, sodium chloride

## 2025-05-08 ENCOUNTER — APPOINTMENT (OUTPATIENT)
Dept: RADIOLOGY | Facility: HOSPITAL | Age: 25
DRG: 981 | End: 2025-05-08
Payer: COMMERCIAL

## 2025-05-08 PROBLEM — M71.121 SEPTIC BURSITIS OF ELBOW, RIGHT: Status: ACTIVE | Noted: 2025-05-03

## 2025-05-08 LAB
ABO GROUP (TYPE) IN BLOOD: NORMAL
ALBUMIN SERPL BCP-MCNC: 4.5 G/DL (ref 3.4–5)
ALP SERPL-CCNC: 144 U/L (ref 33–120)
ALT SERPL W P-5'-P-CCNC: 41 U/L (ref 10–52)
ANION GAP SERPL CALC-SCNC: 10 MMOL/L (ref 10–20)
ANTIBODY SCREEN: NORMAL
APPEARANCE UR: CLEAR
AST SERPL W P-5'-P-CCNC: 83 U/L (ref 9–39)
ATRIAL RATE: 73 BPM
BASOPHILS # BLD AUTO: 0.03 X10*3/UL (ref 0–0.1)
BASOPHILS NFR BLD AUTO: 0.2 %
BILIRUB SERPL-MCNC: 18.2 MG/DL (ref 0–1.2)
BILIRUB UR STRIP.AUTO-MCNC: ABNORMAL MG/DL
BUN SERPL-MCNC: 7 MG/DL (ref 6–23)
CALCIUM SERPL-MCNC: 9.6 MG/DL (ref 8.6–10.6)
CHLORIDE SERPL-SCNC: 94 MMOL/L (ref 98–107)
CO2 SERPL-SCNC: 31 MMOL/L (ref 21–32)
COLOR UR: ABNORMAL
CREAT SERPL-MCNC: 0.81 MG/DL (ref 0.5–1.3)
CRP SERPL-MCNC: 16.01 MG/DL
EGFRCR SERPLBLD CKD-EPI 2021: >90 ML/MIN/1.73M*2
EOSINOPHIL # BLD AUTO: 0.22 X10*3/UL (ref 0–0.7)
EOSINOPHIL NFR BLD AUTO: 1.2 %
ERYTHROCYTE [DISTWIDTH] IN BLOOD BY AUTOMATED COUNT: 21.7 % (ref 11.5–14.5)
ERYTHROCYTE [SEDIMENTATION RATE] IN BLOOD BY WESTERGREN METHOD: 2 MM/H (ref 0–15)
GLUCOSE SERPL-MCNC: 90 MG/DL (ref 74–99)
GLUCOSE UR STRIP.AUTO-MCNC: NORMAL MG/DL
HCT VFR BLD AUTO: 20 % (ref 41–52)
HGB BLD-MCNC: 7.7 G/DL (ref 13.5–17.5)
HGB RETIC QN: 30 PG (ref 28–38)
IMM GRANULOCYTES # BLD AUTO: 0.14 X10*3/UL (ref 0–0.7)
IMM GRANULOCYTES NFR BLD AUTO: 0.8 % (ref 0–0.9)
IMMATURE RETIC FRACTION: 19.2 %
KETONES UR STRIP.AUTO-MCNC: NEGATIVE MG/DL
LDH SERPL L TO P-CCNC: 913 U/L (ref 84–246)
LEUKOCYTE ESTERASE UR QL STRIP.AUTO: NEGATIVE
LYMPHOCYTES # BLD AUTO: 0.79 X10*3/UL (ref 1.2–4.8)
LYMPHOCYTES NFR BLD AUTO: 4.3 %
MCH RBC QN AUTO: 32 PG (ref 26–34)
MCHC RBC AUTO-ENTMCNC: 38.5 G/DL (ref 32–36)
MCV RBC AUTO: 83 FL (ref 80–100)
MONOCYTES # BLD AUTO: 1.85 X10*3/UL (ref 0.1–1)
MONOCYTES NFR BLD AUTO: 10.1 %
NEUTROPHILS # BLD AUTO: 15.37 X10*3/UL (ref 1.2–7.7)
NEUTROPHILS NFR BLD AUTO: 83.4 %
NITRITE UR QL STRIP.AUTO: NEGATIVE
NRBC BLD-RTO: 3.1 /100 WBCS (ref 0–0)
P OFFSET: 189 MS
P ONSET: 127 MS
PH UR STRIP.AUTO: 6.5 [PH]
PLATELET # BLD AUTO: 222 X10*3/UL (ref 150–450)
POTASSIUM SERPL-SCNC: 3.9 MMOL/L (ref 3.5–5.3)
PR INTERVAL: 174 MS
PROT SERPL-MCNC: 7.1 G/DL (ref 6.4–8.2)
PROT UR STRIP.AUTO-MCNC: NEGATIVE MG/DL
Q ONSET: 214 MS
QRS COUNT: 12 BEATS
QRS DURATION: 94 MS
QT INTERVAL: 378 MS
QTC CALCULATION(BAZETT): 416 MS
QTC FREDERICIA: 403 MS
R AXIS: 47 DEGREES
RBC # BLD AUTO: 2.41 X10*6/UL (ref 4.5–5.9)
RBC # UR STRIP.AUTO: NEGATIVE MG/DL
RETICS #: 0.34 X10*6/UL (ref 0.02–0.12)
RETICS/RBC NFR AUTO: 14.3 % (ref 0.5–2)
RH FACTOR (ANTIGEN D): NORMAL
SODIUM SERPL-SCNC: 131 MMOL/L (ref 136–145)
SP GR UR STRIP.AUTO: 1.01
T AXIS: -59 DEGREES
T OFFSET: 403 MS
UROBILINOGEN UR STRIP.AUTO-MCNC: NORMAL MG/DL
VENTRICULAR RATE: 73 BPM
WBC # BLD AUTO: 18.4 X10*3/UL (ref 4.4–11.3)

## 2025-05-08 PROCEDURE — 1170000001 HC PRIVATE ONCOLOGY ROOM DAILY

## 2025-05-08 PROCEDURE — 86140 C-REACTIVE PROTEIN: CPT

## 2025-05-08 PROCEDURE — 84075 ASSAY ALKALINE PHOSPHATASE: CPT

## 2025-05-08 PROCEDURE — 71045 X-RAY EXAM CHEST 1 VIEW: CPT | Performed by: RADIOLOGY

## 2025-05-08 PROCEDURE — 2500000001 HC RX 250 WO HCPCS SELF ADMINISTERED DRUGS (ALT 637 FOR MEDICARE OP)

## 2025-05-08 PROCEDURE — 2500000004 HC RX 250 GENERAL PHARMACY W/ HCPCS (ALT 636 FOR OP/ED): Performed by: PHARMACIST

## 2025-05-08 PROCEDURE — 36415 COLL VENOUS BLD VENIPUNCTURE: CPT

## 2025-05-08 PROCEDURE — 83615 LACTATE (LD) (LDH) ENZYME: CPT

## 2025-05-08 PROCEDURE — 87070 CULTURE OTHR SPECIMN AEROBIC: CPT

## 2025-05-08 PROCEDURE — 89060 EXAM SYNOVIAL FLUID CRYSTALS: CPT

## 2025-05-08 PROCEDURE — 85045 AUTOMATED RETICULOCYTE COUNT: CPT

## 2025-05-08 PROCEDURE — 81003 URINALYSIS AUTO W/O SCOPE: CPT

## 2025-05-08 PROCEDURE — 99233 SBSQ HOSP IP/OBS HIGH 50: CPT

## 2025-05-08 PROCEDURE — 85025 COMPLETE CBC W/AUTO DIFF WBC: CPT

## 2025-05-08 PROCEDURE — 2500000001 HC RX 250 WO HCPCS SELF ADMINISTERED DRUGS (ALT 637 FOR MEDICARE OP): Performed by: PHARMACIST

## 2025-05-08 PROCEDURE — 76705 ECHO EXAM OF ABDOMEN: CPT

## 2025-05-08 PROCEDURE — 2500000004 HC RX 250 GENERAL PHARMACY W/ HCPCS (ALT 636 FOR OP/ED)

## 2025-05-08 PROCEDURE — 87040 BLOOD CULTURE FOR BACTERIA: CPT

## 2025-05-08 PROCEDURE — 86901 BLOOD TYPING SEROLOGIC RH(D): CPT

## 2025-05-08 PROCEDURE — 89051 BODY FLUID CELL COUNT: CPT

## 2025-05-08 PROCEDURE — 2500000004 HC RX 250 GENERAL PHARMACY W/ HCPCS (ALT 636 FOR OP/ED): Mod: JZ,TB

## 2025-05-08 PROCEDURE — 2500000004 HC RX 250 GENERAL PHARMACY W/ HCPCS (ALT 636 FOR OP/ED): Mod: JZ

## 2025-05-08 PROCEDURE — 71045 X-RAY EXAM CHEST 1 VIEW: CPT

## 2025-05-08 PROCEDURE — 88104 CYTOPATH FL NONGYN SMEARS: CPT | Performed by: PATHOLOGY

## 2025-05-08 PROCEDURE — 85652 RBC SED RATE AUTOMATED: CPT

## 2025-05-08 PROCEDURE — 2500000005 HC RX 250 GENERAL PHARMACY W/O HCPCS: Performed by: PHARMACIST

## 2025-05-08 PROCEDURE — 76705 ECHO EXAM OF ABDOMEN: CPT | Performed by: RADIOLOGY

## 2025-05-08 PROCEDURE — 2500000005 HC RX 250 GENERAL PHARMACY W/O HCPCS

## 2025-05-08 RX ORDER — SODIUM CHLORIDE, SODIUM LACTATE, POTASSIUM CHLORIDE, CALCIUM CHLORIDE 600; 310; 30; 20 MG/100ML; MG/100ML; MG/100ML; MG/100ML
75 INJECTION, SOLUTION INTRAVENOUS CONTINUOUS
Status: ACTIVE | OUTPATIENT
Start: 2025-05-08 | End: 2025-05-10

## 2025-05-08 RX ORDER — ACETAMINOPHEN 325 MG/1
650 TABLET ORAL ONCE
Status: COMPLETED | OUTPATIENT
Start: 2025-05-08 | End: 2025-05-08

## 2025-05-08 RX ORDER — VANCOMYCIN HYDROCHLORIDE 1 G/20ML
INJECTION, POWDER, LYOPHILIZED, FOR SOLUTION INTRAVENOUS DAILY PRN
Status: DISCONTINUED | OUTPATIENT
Start: 2025-05-08 | End: 2025-05-12

## 2025-05-08 RX ORDER — HYDROXYZINE HYDROCHLORIDE 25 MG/1
25 TABLET, FILM COATED ORAL EVERY 6 HOURS PRN
Status: DISCONTINUED | OUTPATIENT
Start: 2025-05-08 | End: 2025-05-16 | Stop reason: HOSPADM

## 2025-05-08 RX ADMIN — HYDROMORPHONE HYDROCHLORIDE 6 MG: 2 INJECTION, SOLUTION INTRAMUSCULAR; INTRAVENOUS; SUBCUTANEOUS at 23:07

## 2025-05-08 RX ADMIN — ACETAMINOPHEN 650 MG: 325 TABLET ORAL at 07:47

## 2025-05-08 RX ADMIN — HYDROMORPHONE HYDROCHLORIDE 6 MG: 2 INJECTION, SOLUTION INTRAMUSCULAR; INTRAVENOUS; SUBCUTANEOUS at 12:27

## 2025-05-08 RX ADMIN — HYDROMORPHONE HYDROCHLORIDE 6 MG: 2 INJECTION, SOLUTION INTRAMUSCULAR; INTRAVENOUS; SUBCUTANEOUS at 01:26

## 2025-05-08 RX ADMIN — HYDROMORPHONE HYDROCHLORIDE 6 MG: 2 INJECTION, SOLUTION INTRAMUSCULAR; INTRAVENOUS; SUBCUTANEOUS at 21:04

## 2025-05-08 RX ADMIN — POLYETHYLENE GLYCOL 3350 17 G: 17 POWDER, FOR SOLUTION ORAL at 08:15

## 2025-05-08 RX ADMIN — HYDROXYZINE HYDROCHLORIDE 25 MG: 25 TABLET, FILM COATED ORAL at 18:19

## 2025-05-08 RX ADMIN — ACETAMINOPHEN 325 MG: 325 TABLET ORAL at 18:30

## 2025-05-08 RX ADMIN — PIPERACILLIN SODIUM AND TAZOBACTAM SODIUM 3.38 G: 3; .375 INJECTION, SOLUTION INTRAVENOUS at 23:05

## 2025-05-08 RX ADMIN — PIPERACILLIN SODIUM AND TAZOBACTAM SODIUM 3.38 G: 3; .375 INJECTION, SOLUTION INTRAVENOUS at 10:02

## 2025-05-08 RX ADMIN — ACETAMINOPHEN 325 MG: 325 TABLET ORAL at 18:19

## 2025-05-08 RX ADMIN — SALINE NASAL SPRAY 1 SPRAY: 1.5 SOLUTION NASAL at 12:33

## 2025-05-08 RX ADMIN — FOLIC ACID 1 MG: 1 TABLET ORAL at 07:47

## 2025-05-08 RX ADMIN — HYDROMORPHONE HYDROCHLORIDE 6 MG: 2 INJECTION, SOLUTION INTRAMUSCULAR; INTRAVENOUS; SUBCUTANEOUS at 10:02

## 2025-05-08 RX ADMIN — HYDROMORPHONE HYDROCHLORIDE 6 MG: 2 INJECTION, SOLUTION INTRAMUSCULAR; INTRAVENOUS; SUBCUTANEOUS at 08:10

## 2025-05-08 RX ADMIN — PIPERACILLIN SODIUM AND TAZOBACTAM SODIUM 3.38 G: 3; .375 INJECTION, SOLUTION INTRAVENOUS at 15:23

## 2025-05-08 RX ADMIN — VANCOMYCIN HYDROCHLORIDE 1750 MG: 5 INJECTION, POWDER, LYOPHILIZED, FOR SOLUTION INTRAVENOUS at 12:21

## 2025-05-08 RX ADMIN — HYDROMORPHONE HYDROCHLORIDE 6 MG: 2 INJECTION, SOLUTION INTRAMUSCULAR; INTRAVENOUS; SUBCUTANEOUS at 17:51

## 2025-05-08 RX ADMIN — HYDROMORPHONE HYDROCHLORIDE 6 MG: 2 INJECTION, SOLUTION INTRAMUSCULAR; INTRAVENOUS; SUBCUTANEOUS at 03:25

## 2025-05-08 RX ADMIN — HYDROMORPHONE HYDROCHLORIDE 6 MG: 2 INJECTION, SOLUTION INTRAMUSCULAR; INTRAVENOUS; SUBCUTANEOUS at 15:23

## 2025-05-08 RX ADMIN — SODIUM CHLORIDE, SODIUM LACTATE, POTASSIUM CHLORIDE, AND CALCIUM CHLORIDE 75 ML/HR: .6; .31; .03; .02 INJECTION, SOLUTION INTRAVENOUS at 10:03

## 2025-05-08 RX ADMIN — LIDOCAINE 4% 2 PATCH: 40 PATCH TOPICAL at 07:48

## 2025-05-08 SDOH — ECONOMIC STABILITY: HOUSING INSECURITY

## 2025-05-08 SDOH — ECONOMIC STABILITY: FOOD INSECURITY

## 2025-05-08 SDOH — ECONOMIC STABILITY: TRANSPORTATION INSECURITY

## 2025-05-08 SDOH — ECONOMIC STABILITY: GENERAL

## 2025-05-08 ASSESSMENT — PAIN SCALES - GENERAL
PAINLEVEL_OUTOF10: 9
PAINLEVEL_OUTOF10: 9
PAINLEVEL_OUTOF10: 8
PAINLEVEL_OUTOF10: 9
PAINLEVEL_OUTOF10: 7
PAINLEVEL_OUTOF10: 9
PAINLEVEL_OUTOF10: 8
PAINLEVEL_OUTOF10: 7
PAINLEVEL_OUTOF10: 8
PAINLEVEL_OUTOF10: 8
PAINLEVEL_OUTOF10: 9
PAINLEVEL_OUTOF10: 7
PAINLEVEL_OUTOF10: 9

## 2025-05-08 ASSESSMENT — COGNITIVE AND FUNCTIONAL STATUS - GENERAL
MOBILITY SCORE: 24
DRESSING REGULAR LOWER BODY CLOTHING: A LITTLE
DAILY ACTIVITIY SCORE: 24
DAILY ACTIVITIY SCORE: 23
MOBILITY SCORE: 24

## 2025-05-08 ASSESSMENT — PAIN DESCRIPTION - ORIENTATION: ORIENTATION: RIGHT

## 2025-05-08 ASSESSMENT — PAIN DESCRIPTION - LOCATION: LOCATION: ARM

## 2025-05-08 NOTE — CARE PLAN
The patient's goals for the shift include      The clinical goals for the shift include Pt will remain afebrile through shift 5/8/25 @ 1900      Problem: Pain - Adult  Goal: Verbalizes/displays adequate comfort level or baseline comfort level  Outcome: Progressing     Problem: Safety - Adult  Goal: Free from fall injury  Outcome: Progressing     Problem: Discharge Planning  Goal: Discharge to home or other facility with appropriate resources  Outcome: Progressing     Problem: Chronic Conditions and Co-morbidities  Goal: Patient's chronic conditions and co-morbidity symptoms are monitored and maintained or improved  Outcome: Progressing     Problem: Nutrition  Goal: Nutrient intake appropriate for maintaining nutritional needs  Outcome: Progressing     Problem: Fall/Injury  Goal: Not fall by end of shift  Outcome: Progressing  Goal: Be free from injury by end of the shift  Outcome: Progressing  Goal: Verbalize understanding of personal risk factors for fall in the hospital  Outcome: Progressing  Goal: Verbalize understanding of risk factor reduction measures to prevent injury from fall in the home  Outcome: Progressing

## 2025-05-08 NOTE — PROGRESS NOTES
Child Life Assessment:   Reason for Consult  Discipline: Child Life Specialist  Referral Source: Self  Total Time Spent (min): 35 minutes    Patient Intervention(s)  Type of Intervention Performed: Healing environment interventions  Healing Environment Intervention(s): Assessment, Empathetic listening/validation of emotions, Normalization of environment, Coping skill development/planning, Opportunity for choice and control    Evaluation  Evaluation/Plan of Care: Patient/family receptive, Provide ongoing support    Session Details: Certified Child Life Specialist (CCLS) and patient familiar from previous admissions.  CCLS offered support for current admission.  Patient shared that his older brother had  since CCLS last saw patient and engaged in conversation regarding how he and his mother were coping.  Patient acknowledged the challenges of frequent admissions due to pain crises and shared new healthy ways of coping.  CCLS returned to patient's room with a grief resource list for support groups outside of the hospital.  At this time, CCLS and patient discussed the MRI that is needed and what patient finds most difficult, including pain when moving his arm and the enclosed space.  CCLS and patient discussed potential ways to promote positive coping through imagery and relaxation.  Child life will continue to provide support throughout admission.    GAYATHRI Stephens  [employer] Secure Chat

## 2025-05-08 NOTE — CARE PLAN
Pain management - no acute events overnight     The clinical goals for the shift include pt will remain safe and free of injury    Problem: Pain - Adult  Goal: Verbalizes/displays adequate comfort level or baseline comfort level  Outcome: Progressing     Problem: Chronic Conditions and Co-morbidities  Goal: Patient's chronic conditions and co-morbidity symptoms are monitored and maintained or improved  Outcome: Progressing     Problem: Nutrition  Goal: Nutrient intake appropriate for maintaining nutritional needs  Outcome: Progressing

## 2025-05-08 NOTE — SIGNIFICANT EVENT
24-year-old male with history of (NLPHL), not on active chemotherapy, and HbSS sickle cell disease and baseline nocturnal hypoxia on 2-3L O2) presented with sickle cell crisis and right arm pain.    Initial CXR and CT PE were unremarkable; patient was asymptomatic from a respiratory standpoint. Treated with Zosyn (5/3–5/4). By 5/5, chest and back pain improved, but right arm pain worsened, requiring escalating opioids. Initially declined MRI, but later consented—however, unable to tolerate MRI on 5/7.  X-ray on 5/7 showed large right elbow joint effusion. On 5/8, patient developed fevers and worsening CRP, WBCs, and hemolysis markers. Orthopedics consulted for concern of osteomyelitis vs. septic arthritis. Joint aspiration performed; recommendations pending. Started Zosyn and Vancomycin (5/8–). MRI with anesthesia planned for 5/9 to evaluate for osteomyelitis.    Cardiac risk stratification: RCRI = 0 (3.9% risk of major cardiac events); DASI = 58.2.

## 2025-05-08 NOTE — CONSULTS
Vancomycin Dosing by Pharmacy- INITIAL    Joellen Narayan is a 24 y.o. year old male who Pharmacy has been consulted for vancomycin dosing for osteomyelitis/septic arthritis. Based on the patient's indication and renal status this patient will be dosed based on a goal AUC of 400-600.     Renal function is currently stable.    Visit Vitals  /79 (BP Location: Left arm, Patient Position: Lying)   Pulse 95   Temp (!) 38.3 °C (100.9 °F) (Temporal) Comment: RN notified   Resp 18        Lab Results   Component Value Date    CREATININE 0.81 2025    CREATININE 0.59 2025    CREATININE 0.60 2025    CREATININE 0.65 2025        Patient weight is as follows:   Vitals:    25 0639   Weight: 72.6 kg (160 lb)       Cultures:  Susceptibility data for the encounter in last 14 days.  Collected Specimen Info Organism   25 Fluid from SPUTUM Normal throat raulito         I/O last 3 completed shifts:  In: 240 (3.3 mL/kg) [P.O.:240]  Out: 2300 (31.7 mL/kg) [Urine:2300 (0.9 mL/kg/hr)]  Weight: 72.6 kg   I/O during current shift:  No intake/output data recorded.    Temp (24hrs), Av.4 °C (99.4 °F), Min:36.3 °C (97.3 °F), Max:38.8 °C (101.8 °F)         Assessment/Plan     Patient will be given a loading dose of 1750 mg.  Will initiate vancomycin maintenance, 1250 mg every 12 hours.    This dosing regimen is predicted by [employer] to result in the following pharmacokinetic parameters:    Loading dose: N/A  Regimen: 1250 mg IV every 12 hours.  Start time: 09:40 on 2025  Exposure target: AUC24 (range)400-600 mg/L.hr   [id number]: 417 mg/L.hr  AUC24,ss: 476 mg/L.hr  Probability of AUC24 > 400: 68 %  Ctrough,ss: 13.3 mg/L  Probability of Ctrough,ss > 20: 22 %      Follow-up level will be ordered on  at 14:00 unless clinically indicated sooner.  Will continue to monitor renal function daily while on vancomycin and order serum creatinine at least every 48 hours if not already ordered.  Follow for  continued vancomycin needs, clinical response, and signs/symptoms of toxicity.       INEZ GARCIA

## 2025-05-08 NOTE — PROGRESS NOTES
"Joellen Narayan is a 24 y.o. male on day 5 of admission presenting with Sickle cell pain crisis (Multi).    Subjective   Pt seen at bedside this AM,       Objective     Physical Exam  Constitutional:       Appearance: He is ill-appearing.   HENT:      Head: Normocephalic.      Right Ear: External ear normal.      Left Ear: External ear normal.      Nose: Nose normal.   Eyes:      Extraocular Movements: Extraocular movements intact.      Conjunctiva/sclera: Conjunctivae normal.   Cardiovascular:      Rate and Rhythm: Normal rate.   Pulmonary:      Effort: Pulmonary effort is normal.      Breath sounds: Normal breath sounds.   Abdominal:      General: Abdomen is flat.      Palpations: Abdomen is soft.   Musculoskeletal:         General: Swelling and tenderness present.      Cervical back: Normal range of motion.      Right lower leg: Edema present.   Skin:     General: Skin is dry.   Neurological:      Mental Status: He is alert and oriented to person, place, and time. Mental status is at baseline.      Coordination: Coordination abnormal.   Psychiatric:         Mood and Affect: Mood normal.         Behavior: Behavior normal.         Last Recorded Vitals  Blood pressure 133/67, pulse 91, temperature 37 °C (98.6 °F), temperature source Temporal, resp. rate 18, height 1.88 m (6' 2\"), weight 72.6 kg (160 lb), SpO2 99%.  Intake/Output last 3 Shifts:  I/O last 3 completed shifts:  In: 240 (3.3 mL/kg) [P.O.:240]  Out: 2300 (31.7 mL/kg) [Urine:2300 (0.9 mL/kg/hr)]  Weight: 72.6 kg     Relevant Results       .Scheduled medications  Scheduled Medications[1]  Continuous medications  Continuous Medications[2]  PRN medications  PRN Medications[3]    .  Results for orders placed or performed during the hospital encounter of 05/03/25 (from the past 24 hours)   CBC and Auto Differential   Result Value Ref Range    WBC 18.4 (H) 4.4 - 11.3 x10*3/uL    nRBC 3.1 (H) 0.0 - 0.0 /100 WBCs    RBC 2.41 (L) 4.50 - 5.90 x10*6/uL    Hemoglobin " 7.7 (L) 13.5 - 17.5 g/dL    Hematocrit 20.0 (L) 41.0 - 52.0 %    MCV 83 80 - 100 fL    MCH 32.0 26.0 - 34.0 pg    MCHC 38.5 (H) 32.0 - 36.0 g/dL    RDW 21.7 (H) 11.5 - 14.5 %    Platelets 222 150 - 450 x10*3/uL    Neutrophils % 83.4 40.0 - 80.0 %    Immature Granulocytes %, Automated 0.8 0.0 - 0.9 %    Lymphocytes % 4.3 13.0 - 44.0 %    Monocytes % 10.1 2.0 - 10.0 %    Eosinophils % 1.2 0.0 - 6.0 %    Basophils % 0.2 0.0 - 2.0 %    Neutrophils Absolute 15.37 (H) 1.20 - 7.70 x10*3/uL    Immature Granulocytes Absolute, Automated 0.14 0.00 - 0.70 x10*3/uL    Lymphocytes Absolute 0.79 (L) 1.20 - 4.80 x10*3/uL    Monocytes Absolute 1.85 (H) 0.10 - 1.00 x10*3/uL    Eosinophils Absolute 0.22 0.00 - 0.70 x10*3/uL    Basophils Absolute 0.03 0.00 - 0.10 x10*3/uL   Comprehensive metabolic panel   Result Value Ref Range    Glucose 90 74 - 99 mg/dL    Sodium 131 (L) 136 - 145 mmol/L    Potassium 3.9 3.5 - 5.3 mmol/L    Chloride 94 (L) 98 - 107 mmol/L    Bicarbonate 31 21 - 32 mmol/L    Anion Gap 10 10 - 20 mmol/L    Urea Nitrogen 7 6 - 23 mg/dL    Creatinine 0.81 0.50 - 1.30 mg/dL    eGFR >90 >60 mL/min/1.73m*2    Calcium 9.6 8.6 - 10.6 mg/dL    Albumin 4.5 3.4 - 5.0 g/dL    Alkaline Phosphatase 144 (H) 33 - 120 U/L    Total Protein 7.1 6.4 - 8.2 g/dL    AST 83 (H) 9 - 39 U/L    Bilirubin, Total 18.2 (H) 0.0 - 1.2 mg/dL    ALT 41 10 - 52 U/L   Reticulocytes   Result Value Ref Range    Retic % 14.3 (H) 0.5 - 2.0 %    Retic Absolute 0.345 (H) 0.022 - 0.118 x10*6/uL    Reticulocyte Hemoglobin 30 28 - 38 pg    Immature Retic fraction 19.2 (H) <=16.0 %   Lactate dehydrogenase   Result Value Ref Range     (H) 84 - 246 U/L   Sedimentation rate, automated   Result Value Ref Range    Sedimentation Rate 2 0 - 15 mm/h   C-reactive protein   Result Value Ref Range    C-Reactive Protein 16.01 (H) <1.00 mg/dL   Body fluid cell count   Result Value Ref Range    Color, Fluid Katrina (A) Colorless, Straw, Yellow    Clarity, Fluid Turbid (A)  Clear    WBC, Fluid 44,780 See Comment /uL    RBC, Fluid 20,000 see comment /uL   Crystal Identification, Synovial Fluid   Result Value Ref Range    Crystal Identification, Synovial Fluid  No crystals seen by bright-field or first order red axis polarization microscopy.     No crystals seen by bright-field or first order red axis polarization microscopy.   Body Fluid Differential   Result Value Ref Range    Neutrophils %, Manual, Fluid 95 see comment %    Lymphocytes %, Manual, Fluid 0 see comment %    Mono/Macrophages %, Manual, Fluid 5 see comment %    Eosinophils %, Manual, Fluid 0 see comment %    Basophils %, Manual, Fluid 0 not established %    Immature Granulocytes %, Manual, Fluid 0 not established %    Blasts %, Manual, Fluid 0 not established %    Unclassified Cells %, Manual, Fluid 0 not established %    Plasma Cells %, Manual, Fluid 0 not established %    Total Cells Counted, Fluid 100    Sterile Fluid Culture/Smear    Specimen: Synovial; Fluid   Result Value Ref Range    Gram Stain (4+) Abundant Polymorphonuclear leukocytes     Gram Stain No organisms seen      *Note: Due to a large number of results and/or encounters for the requested time period, some results have not been displayed. A complete set of results can be found in Results Review.       Assessment & Plan  Sickle cell pain crisis (Multi)    Joellen Narayan 24 y.o. 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 Patient initially 98% RA, however became hypoxic in ED requiring 2-4 liters, unclear etiology for hypoxia at this time, possibly related to poor inspiratory effort in setting of severe pain  vs ACS (low suspicion at this time, however close monitoring) vs infectious process. Multiple CXR w/o evidence of acute process. 5/3 CT PE negative, similar GGOs from prior scans unchanged. Pt asymptomatic, denies respiratory s/s. S/p Zosyn (5/3-5/4). 5/5 Pt reports chest and back pain improved however R arm becoming progressively more severe requiring increasing opioids, initially pt declining MRI R arm but then agreeable. Pt unable to tolerate MRI 5/7. 5/7 Xray showing large R elbow joint effusion. 5/8 Pt became febrile x3, CRP/WBC/hemolysis labs significantly worsening, Ortho consulted c/f osteo vs septic arthritis, s/p joint aspiration, recs pending. Started Zosyn/Vanc (5/8- ). Plan for re-attempt with MRI w/anesthesia possibly tomorrow 5/9 r/o osteomyelitis.      # VERÓNICA pain/edema c/f osteomyelitis vs septic arthritis   - original etiology sickle cell related vs AVN vs osteomyelitis vs thrombus vs septic arthritis    - no imaging obtained on admit, pt states acute pain feels like sickle cell pain 5/6   - Jan 2025 there was ? hx of OM of lower extremities vs chronic SCD changes but no surgical interventions required at time therefore low threshold for MRI during this admission  - 5/3 EKG NSR, no ST elevation appears similar to prior   - RUE US neg for DVT (5/5)   - ESR/ CRP <1/5.16 (5/4) --> <1/7.85 (5/5) --> <1, 6.15 (5/6) --> CRP 16.01 (5/8)  - 5/5 offered MRI R arm pt declining at this time even when offered with premedication  - attempted (5/7) MRI R arm (pt did not tolerate exam)  - 5/7 Xray R arm showing large elbow joint effusion   - 5/8 pt became febrile x3, highest 38.8 s/p tylenol 650mg once   - started Zosyn/Vanc (5/8-  ) and IVF x 48hr  - 5/8 Ortho consulted, s/p aspirate (>44k cells), formal recs pending   - Plan for MRI w/anesthesia tomorrow 5/9 r/o osteomyelitis   - s/p increased to 5.5mg IVP dilaudid q2 PRN (5/5), offered dPC pt declining at this time as well   - increased to 6mg IVP dilaudid q2h  prn for severe pain (5/6-current)     # AHRF, requiring 2-4L on admission  - On 2-3L at night, however patient reports daytime hypoxia as well in the past, currently on 3L 5/8  - unclear at this time, etiology includes acute chest vs poor inspiratory effort in setting of pain vs pneumonia vs PE vs pulm htn  - 5/3 Cxr w/o evidence of acute process   - 5/3 CT PE w/o evidence of PE, similar GGOs from prior likely atelectasis (similar to Dec 2024)   - strep pneum, legionella negative 5/4   - 5/5 ECHO EF 63%, LV severely dilated   - trop 13 (5/5)   - 5/5 repeat CXR neg for acute process, 5/8 CXR w/o evidence acute process   - c/w continuous pulse oximetry  - Continue supportive O2 with NC  - 5/5 EKG NSR  - Flu A/B, covid neg (5/5)  - respx cx 5/4 negative thus far  - low c/f infectious process at this time, empirix atbx discontinued 5/4 per Dr. Correia     # Hgb SS disease   # hx acute chest syndrome   - OARRS reviewed on admission, no aberrancies noted   - Hgb baseline: 9-10, 8.1 on admission, (5/5) Hg 7.5, in setting of worsening hemolysis and pain, s/p 1u pRBC --> Hgb 7.6 (5/6) --> Hgb 8.6 (5/7)  - T.bili: 9.5 LDH: 779 Retic: 13.1 --> (5/7) tbili 11, , retic 17.7%  - leukocytosis noted (5/7) WBC 12.4, likely reactive as pt afebrile with no other s/sx of infectious process  - Admission, s/p initiating 4mg dilaudid q2h prn, IV toradol 15mg q6h x 3 days, lidocaine patch, flexeril as needed, tylenol   - c/w home folic acid daily  - utox pending   - Exchange labs resulted 5/2-5/5, repeat ordered for 5/9   - 5/4 Hg S 92.8%  - s/p 5.5mg IVP dilaudid q2 PRN severe pain (5/5-5/6)   - start 6mg IV dilaudid q2h PRN for severe pain (5/6-current)  - bowel regimen for opioid induced constipation, zofran for opioid induced nausea, and benadrly for opioid induced pruritis      # hx choledocholithiasis 7/2024  - worsening hemolysis in setting of active infection could be contributing to increased hyperbilirubinemia   - July  2024 s/p ERCP with biliary sphincterotomy where sludge and stones were removed, achieving complete clearance   - 1/31/25 was planned for cholecystectomy with Dr. Dove but no show on day of surgery and again 2/13 and 2/27   - no RUQ pain on exam, however pt with leukocytosis, febrile  - blood cx x2 pending collection 5/8   - LFTs at baseline, bili sh  - tbili baseline ~7-9, (5/6) 10.6 --> (5/8) 18.2  - RUQ US pending, LR @ 75 x48 (5/8-5/10)     # Hx Priapism  - no active issues on admit   - hx previously drained by urology   - c/w home sudafed PRN     # Nodular lymphocyte predominant Hodgkins lymphoma (NLPHL)    - Follows with Dr. Stoll  - s/p Rituxan and prednisone q3 weeks (C1 1/18/24, C2 2/8/24, Missed C3 d/t sickle cell pain crisis, C4 4/4/24, C5 5/16/24, C6 6/7/24)   - 3/13 No show to onc appt   - 4/9 PET showed interval development of new FDG focus in mid abdomen  - will need onc appt scheduled prior to discharge      # Hx of nocturnal oxygen dependence  - baseline 2-3 L overnight  - Pulm outpatient appt scheduled back in February  - Will need pulm outpatient scheduling     # prophy  - Enoxaparin   - SCDs, encouraged ambulation      # dispo  - Code Status: Full Code (confirmed on admission)   - DC home resumed O2 pending infectious workup  - NOK: Melissa, mother, [phone] - updated via phone 5/8   - FUV 7/9 sickle cell clinic, onc & pulm FUV requested     I spent > 60 minutes in the professional and overall care of this patient.     Assessment and plan as above discussed with attending physician Dr. Parnell.       RAAD Tom-CNP         [1] diphenhydrAMINE, 25 mg, oral, Once  enoxaparin, 40 mg, subcutaneous, Daily  folic acid, 1 mg, oral, Daily  lidocaine, 2 patch, transdermal, Daily  piperacillin-tazobactam, 3.375 g, intravenous, q6h  polyethylene glycol, 17 g, oral, Daily  sodium chloride, 1 spray, Each Nostril, 4x daily  vancomycin, 1,250 mg, intravenous, q12h  vancomycin, 1,750 mg,  intravenous, Once  [2] lactated Ringer's, 75 mL/hr, Last Rate: 75 mL/hr (05/08/25 1003)  [3] PRN medications: cyclobenzaprine, diphenhydrAMINE, HYDROmorphone, ondansetron, pseudoephedrine, sennosides-docusate sodium, sodium chloride, vancomycin

## 2025-05-08 NOTE — CONSULTS
ORTHOPAEDIC CONSULTATION     History Of Present Illness  Joellen Narayan is a 24 y.o. male presenting with right elbow pain.    Orthopaedic Problems/Injuries:  HPI: 24M (Sickle cell w recurrent pain crises, Hodgkin’s lymphoma s/p chemo) adm for 5d w extremity pain c/f repeat crisis. Worsening R elbow pain x 1d. Febrile since this AM, ow VSS.    Location: Painful at right elbow  Duration: Pain has been persistent since last five days with worsening over last day  Severity: 8 /10  Worsened by movement/Palpation, improved with rest and pain medication  Open/Closed: Closed, NVI: NVI  Associated symptoms: no associated numbness/tingling/weakness    Past Medical History  He has a past medical history of Acute chest syndrome (Multi), Corrosion of unspecified body region, unspecified degree (12/31/2014), Impetigo (01/04/2024), Nicotine use disorder, Nodular lymphocyte predominant Hodgkin lymphoma, Personal history of diseases of the blood and blood-forming organs and certain disorders involving the immune mechanism, Personal history of other (healed) physical injury and trauma (01/03/2015), Personal history of other diseases of the circulatory system, Personal history of other diseases of the circulatory system, Priapism, Rash and other nonspecific skin eruption (09/09/2014), and Sickle cell disease (Multi).    Surgical History  He has a past surgical history that includes IR CVC tunneled (6/21/2022); CT guided percutaneous biopsy LYMPH node superficial (11/18/2022); and CT guided percutaneous abdominal retroperitoneum biopsy (11/30/2023).     Social History  He reports that he has been smoking cigars. He has been exposed to tobacco smoke. He has never used smokeless tobacco. He reports that he does not currently use alcohol. He reports current drug use. Drug: Marijuana.    Family History  Family History[1]     Allergies  Cefepime, Amoxicillin, and Ceftriaxone    Review of Systems  12 point ROS negative unless stated in  "HPI     Physical Exam  Constitutional: Comfortable, NAD  HEENT: hearing and vision grossly intact, MMM  Resp: breathing comfortably   CV: extremities warm, well perfused  GI: abd soft  Neuro: AAOx3, sensory and motor grossly intact  Psych: Appropriate mood and affect ,     RUE:   -Tender to palpation over joint  - Pain with short arcs of motion   - Elbow joint effusion   -Motor intact in axillary/AIN/PIN/ulnar distributions  -SILT axillary/radial/median/ulnar distributions  -Hand warm, well perfused  -Palpable  radial pulse, cap refill brisk  -Compartments soft and compressible     Last Recorded Vitals  Blood pressure 113/66, pulse (!) 112, temperature (!) 38.6 °C (101.5 °F), temperature source Temporal, resp. rate 18, height 1.88 m (6' 2\"), weight 72.6 kg (160 lb), SpO2 95%.    Relevant Results  Results for orders placed or performed during the hospital encounter of 05/03/25 (from the past 24 hours)   CBC and Auto Differential   Result Value Ref Range    WBC 18.4 (H) 4.4 - 11.3 x10*3/uL    nRBC 3.1 (H) 0.0 - 0.0 /100 WBCs    RBC 2.41 (L) 4.50 - 5.90 x10*6/uL    Hemoglobin 7.7 (L) 13.5 - 17.5 g/dL    Hematocrit 20.0 (L) 41.0 - 52.0 %    MCV 83 80 - 100 fL    MCH 32.0 26.0 - 34.0 pg    MCHC 38.5 (H) 32.0 - 36.0 g/dL    RDW 21.7 (H) 11.5 - 14.5 %    Platelets 222 150 - 450 x10*3/uL    Neutrophils % 83.4 40.0 - 80.0 %    Immature Granulocytes %, Automated 0.8 0.0 - 0.9 %    Lymphocytes % 4.3 13.0 - 44.0 %    Monocytes % 10.1 2.0 - 10.0 %    Eosinophils % 1.2 0.0 - 6.0 %    Basophils % 0.2 0.0 - 2.0 %    Neutrophils Absolute 15.37 (H) 1.20 - 7.70 x10*3/uL    Immature Granulocytes Absolute, Automated 0.14 0.00 - 0.70 x10*3/uL    Lymphocytes Absolute 0.79 (L) 1.20 - 4.80 x10*3/uL    Monocytes Absolute 1.85 (H) 0.10 - 1.00 x10*3/uL    Eosinophils Absolute 0.22 0.00 - 0.70 x10*3/uL    Basophils Absolute 0.03 0.00 - 0.10 x10*3/uL   Comprehensive metabolic panel   Result Value Ref Range    Glucose 90 74 - 99 mg/dL    Sodium " 131 (L) 136 - 145 mmol/L    Potassium 3.9 3.5 - 5.3 mmol/L    Chloride 94 (L) 98 - 107 mmol/L    Bicarbonate 31 21 - 32 mmol/L    Anion Gap 10 10 - 20 mmol/L    Urea Nitrogen 7 6 - 23 mg/dL    Creatinine 0.81 0.50 - 1.30 mg/dL    eGFR >90 >60 mL/min/1.73m*2    Calcium 9.6 8.6 - 10.6 mg/dL    Albumin 4.5 3.4 - 5.0 g/dL    Alkaline Phosphatase 144 (H) 33 - 120 U/L    Total Protein 7.1 6.4 - 8.2 g/dL    AST 83 (H) 9 - 39 U/L    Bilirubin, Total 18.2 (H) 0.0 - 1.2 mg/dL    ALT 41 10 - 52 U/L   Reticulocytes   Result Value Ref Range    Retic % 14.3 (H) 0.5 - 2.0 %    Retic Absolute 0.345 (H) 0.022 - 0.118 x10*6/uL    Reticulocyte Hemoglobin 30 28 - 38 pg    Immature Retic fraction 19.2 (H) <=16.0 %   Lactate dehydrogenase   Result Value Ref Range     (H) 84 - 246 U/L   Sedimentation rate, automated   Result Value Ref Range    Sedimentation Rate 2 0 - 15 mm/h   C-reactive protein   Result Value Ref Range    C-Reactive Protein 16.01 (H) <1.00 mg/dL   Urinalysis with Reflex Culture and Microscopic   Result Value Ref Range    Color, Urine Dark-Yellow Light-Yellow, Yellow, Dark-Yellow    Appearance, Urine Clear Clear    Specific Gravity, Urine 1.014 1.005 - 1.035    pH, Urine 6.5 5.0, 5.5, 6.0, 6.5, 7.0, 7.5, 8.0    Protein, Urine NEGATIVE NEGATIVE, 10 (TRACE), 20 (TRACE) mg/dL    Glucose, Urine Normal Normal mg/dL    Blood, Urine NEGATIVE NEGATIVE mg/dL    Ketones, Urine NEGATIVE NEGATIVE mg/dL    Bilirubin, Urine 1 (1+) (A) NEGATIVE mg/dL    Urobilinogen, Urine Normal Normal mg/dL    Nitrite, Urine NEGATIVE NEGATIVE    Leukocyte Esterase, Urine NEGATIVE NEGATIVE   Body fluid cell count   Result Value Ref Range    Color, Fluid Katrina (A) Colorless, Straw, Yellow    Clarity, Fluid Turbid (A) Clear    WBC, Fluid 44,780 See Comment /uL    RBC, Fluid 20,000 see comment /uL   Crystal Identification, Synovial Fluid   Result Value Ref Range    Crystal Identification, Synovial Fluid  No crystals seen by bright-field or first  order red axis polarization microscopy.     No crystals seen by bright-field or first order red axis polarization microscopy.   Body Fluid Differential   Result Value Ref Range    Neutrophils %, Manual, Fluid 95 see comment %    Lymphocytes %, Manual, Fluid 0 see comment %    Mono/Macrophages %, Manual, Fluid 5 see comment %    Eosinophils %, Manual, Fluid 0 see comment %    Basophils %, Manual, Fluid 0 not established %    Immature Granulocytes %, Manual, Fluid 0 not established %    Blasts %, Manual, Fluid 0 not established %    Unclassified Cells %, Manual, Fluid 0 not established %    Plasma Cells %, Manual, Fluid 0 not established %    Total Cells Counted, Fluid 100    Sterile Fluid Culture/Smear    Specimen: Synovial; Fluid   Result Value Ref Range    Gram Stain (4+) Abundant Polymorphonuclear leukocytes     Gram Stain No organisms seen    Blood Culture    Specimen: Peripheral Venipuncture; Blood culture   Result Value Ref Range    Blood Culture Loaded on Instrument - Culture in progress    Blood Culture    Specimen: Peripheral Venipuncture; Blood culture   Result Value Ref Range    Blood Culture Loaded on Instrument - Culture in progress    Type and screen   Result Value Ref Range    ABO TYPE B     Rh TYPE POS     ANTIBODY SCREEN NEG    Extra Urine Gray Tube   Result Value Ref Range    Extra Tube 293      *Note: Due to a large number of results and/or encounters for the requested time period, some results have not been displayed. A complete set of results can be found in Results Review.       Imaging  XR chest 1 view  Result Date: 5/8/2025  1.  No evidence of acute cardiopulmonary process.   I personally reviewed the images/study and I agree with the findings as stated by resident physician Daniel Gale MD. This study was interpreted at [facility], [place], [place].   MACRO: None   Signed by: Ed Wren 5/8/2025 12:40 PM Dictation workstation:   [id number]    XR forearm right 2  views  Result Date: 5/8/2025  1. Large elbow joint effusion without evidence of fracture, malalignment or erosions. If there is clinical concern MRI can be performed for further evaluation of this finding   I personally reviewed the image(s)/study and interpretation by Dave Tate MD (resident).   MACRO: None.   Signed by: Shane Varela 5/8/2025 7:42 AM Dictation workstation:   [id number]    XR humerus right  Result Date: 5/8/2025  1. Large elbow joint effusion without evidence of fracture, malalignment or erosions. If there is clinical concern MRI can be performed for further evaluation of this finding   I personally reviewed the image(s)/study and interpretation by Dave Tate MD (resident).   MACRO: None.   Signed by: Shane Varela 5/8/2025 7:42 AM Dictation workstation:   [id number]    XR shoulder right 2+ views  Result Date: 5/8/2025  1. Large elbow joint effusion without evidence of fracture, malalignment or erosions. If there is clinical concern MRI can be performed for further evaluation of this finding   I personally reviewed the image(s)/study and interpretation by Dave Tate MD (resident).   MACRO: None.   Signed by: Shane Varela 5/8/2025 7:42 AM Dictation workstation:   [id number]    XR elbow right 1-2 views  Result Date: 5/8/2025  1. Large elbow joint effusion without evidence of fracture, malalignment or erosions. If there is clinical concern MRI can be performed for further evaluation of this finding   I personally reviewed the image(s)/study and interpretation by Dave Tate MD (resident).   MACRO: None.   Signed by: Shaen Varela 5/8/2025 7:42 AM Dictation workstation:   [id number]    MR humerus right wo IV contrast  Result Date: 5/7/2025  Nondiagnostic exam secondary to incomplete imaging. Repeat imaging can be obtained with sedation if clinically indicated.   Additionally, the  images demonstrate multifocal areas of abnormal signal intensity within the right  lung. Consider correlation with a repeat chest radiograph given that these findings were not characterized on comparison chest radiograph dated 05/05/2025 were CT chest dated 05/03/2025.   MACRO: None   Signed by: Brad Marmolejo 5/7/2025 11:32 AM Dictation workstation:   [id number]    MR radius ulna right wo IV contrast  Result Date: 5/7/2025  Nondiagnostic exam secondary to incomplete imaging. Repeat imaging can be obtained with sedation if clinically indicated.   Additionally, the  images demonstrate multifocal areas of abnormal signal intensity within the right lung. Consider correlation with a repeat chest radiograph given that these findings were not characterized on comparison chest radiograph dated 05/05/2025 were CT chest dated 05/03/2025.   MACRO: None   Signed by: Brad Marmolejo 5/7/2025 11:32 AM Dictation workstation:   [id number]      Cardiology, Vascular, and Other Imaging  No other imaging results found for the past 2 days       Assessment/Plan   Injury: R elbow SA w possible OM  HPI: 24M (Sickle cell w recurrent pain crises, Hodgkin’s lymphoma s/p chemo) adm for 5d w extremity pain c/f repeat crisis. Worsening R elbow pain x 1d. Febrile since this AM, ow VSS. NVI. R lateral elbow effusion, TTP, pain w short arcs. XR w/o fx. WBC 18.4, ESR 2, CRP 16.01. R elbow aspirated for 7cc serosanguinous fluid w ng crystals, 44.8k WBC, 95% PMN, cx pending. Consistent with right elbow septic arthritis.     In setting of sickle cell and duration of symptoms, patient needs a R elbow MRI prior to OR to evaluation for underlying osteomyelitis     Plan:   - NPO at midnight for upcoming surgery with orthopedics.  - Plan for sedated MRI in the morning  - Admitted to [facility], [place] for OR by primary team   - Please obtain pre-operative labs/studies: T&S, PT/INR, CBC, BMP, CXR, EKG  - Consented and posted to OR schedule for R elbow I&D w/ orthopedic surgery on 5/9/25  - SINTIA SCHROEDER  - Pre-operative ABx: on vanc/zosyn,  continue per ID; will obtain intra op cultures   - No indication for transfusion pre-operatively  - DVT PPx: SCDs, okay for chemoppx per primary    Consult seen and staffed within 30 minutes of notification.    This consult was staffed with attending physician, Dr. Fitzgerald.  Neelima Recinos, PGY-3  Orthopaedic Surgery   Pager: [phone]  [employer] Preferred     Patient will be followed by the Orthopaedic Trauma Team:  1st Call: Loree Pastrana  2nd Call: Ramez Garcia  3rd Call: Linn Recinos  Available via [employer]  Pager: [phone]          [1]   Family History  Problem Relation Name Age of Onset    Sickle cell trait Mother      Diabetes Mother      Sickle cell trait Father      Lung cancer Brother

## 2025-05-09 ENCOUNTER — ANESTHESIA (OUTPATIENT)
Dept: RADIOLOGY | Facility: HOSPITAL | Age: 25
End: 2025-05-09
Payer: COMMERCIAL

## 2025-05-09 ENCOUNTER — APPOINTMENT (OUTPATIENT)
Dept: RADIOLOGY | Facility: HOSPITAL | Age: 25
DRG: 981 | End: 2025-05-09
Payer: COMMERCIAL

## 2025-05-09 ENCOUNTER — ANESTHESIA (OUTPATIENT)
Dept: OPERATING ROOM | Facility: HOSPITAL | Age: 25
End: 2025-05-09
Payer: COMMERCIAL

## 2025-05-09 ENCOUNTER — ANESTHESIA EVENT (OUTPATIENT)
Dept: OPERATING ROOM | Facility: HOSPITAL | Age: 25
End: 2025-05-09
Payer: COMMERCIAL

## 2025-05-09 ENCOUNTER — ANESTHESIA EVENT (OUTPATIENT)
Dept: RADIOLOGY | Facility: HOSPITAL | Age: 25
End: 2025-05-09
Payer: COMMERCIAL

## 2025-05-09 LAB
ABO GROUP (TYPE) IN BLOOD: NORMAL
ALBUMIN SERPL BCP-MCNC: 4.2 G/DL (ref 3.4–5)
ALP SERPL-CCNC: 151 U/L (ref 33–120)
ALT SERPL W P-5'-P-CCNC: 48 U/L (ref 10–52)
ANION GAP SERPL CALC-SCNC: 15 MMOL/L (ref 10–20)
ANTIBODY SCREEN: NORMAL
APTT PPP: 28 SECONDS (ref 26–36)
AST SERPL W P-5'-P-CCNC: 78 U/L (ref 9–39)
BASOPHILS # BLD AUTO: 0.04 X10*3/UL (ref 0–0.1)
BASOPHILS NFR BLD AUTO: 0.2 %
BASOPHILS NFR FLD MANUAL: 0 %
BILIRUB SERPL-MCNC: 26.1 MG/DL (ref 0–1.2)
BLASTS NFR FLD MANUAL: 0 % (ref ?–0)
BUN SERPL-MCNC: 8 MG/DL (ref 6–23)
CA-I BLD-SCNC: 1.24 MMOL/L (ref 1.1–1.33)
CALCIUM SERPL-MCNC: 9.6 MG/DL (ref 8.6–10.6)
CHLORIDE SERPL-SCNC: 98 MMOL/L (ref 98–107)
CLARITY FLD: ABNORMAL
CO2 SERPL-SCNC: 26 MMOL/L (ref 21–32)
COLOR FLD: ABNORMAL
CREAT SERPL-MCNC: 0.68 MG/DL (ref 0.5–1.3)
EGFRCR SERPLBLD CKD-EPI 2021: >90 ML/MIN/1.73M*2
EOSINOPHIL # BLD AUTO: 0.45 X10*3/UL (ref 0–0.7)
EOSINOPHIL NFR BLD AUTO: 2.7 %
EOSINOPHIL NFR FLD MANUAL: 0 %
ERYTHROCYTE [DISTWIDTH] IN BLOOD BY AUTOMATED COUNT: 19.7 % (ref 11.5–14.5)
GLUCOSE SERPL-MCNC: 82 MG/DL (ref 74–99)
HCT VFR BLD AUTO: 19.2 % (ref 41–52)
HGB BLD-MCNC: 7.2 G/DL (ref 13.5–17.5)
HGB RETIC QN: 27 PG (ref 28–38)
HOLD SPECIMEN: 293
IMM GRANULOCYTES # BLD AUTO: 0.14 X10*3/UL (ref 0–0.7)
IMM GRANULOCYTES NFR BLD AUTO: 0.8 % (ref 0–0.9)
IMMATURE GRANULOCYTES IN FLUID: 0 %
IMMATURE RETIC FRACTION: 20.7 %
INR PPP: 1.5 (ref 0.9–1.1)
LDH SERPL L TO P-CCNC: 781 U/L (ref 84–246)
LYMPHOCYTES # BLD AUTO: 1.46 X10*3/UL (ref 1.2–4.8)
LYMPHOCYTES NFR BLD AUTO: 8.6 %
LYMPHOCYTES NFR FLD MANUAL: 0 %
MCH RBC QN AUTO: 30.9 PG (ref 26–34)
MCHC RBC AUTO-ENTMCNC: 37.5 G/DL (ref 32–36)
MCV RBC AUTO: 82 FL (ref 80–100)
MONOCYTES # BLD AUTO: 2 X10*3/UL (ref 0.1–1)
MONOCYTES NFR BLD AUTO: 11.8 %
MONOS+MACROS NFR FLD MANUAL: 5 %
NEUTROPHILS # BLD AUTO: 12.85 X10*3/UL (ref 1.2–7.7)
NEUTROPHILS NFR BLD AUTO: 75.9 %
NEUTROPHILS NFR FLD MANUAL: 95 %
NRBC BLD-RTO: 1.6 /100 WBCS (ref 0–0)
OTHER CELLS NFR FLD MANUAL: 0 % (ref ?–0)
PATH REVIEW-CELL CT,FLUID: NORMAL
PLASMA CELLS NFR FLD MANUAL: 0 % (ref ?–0)
PLATELET # BLD AUTO: 232 X10*3/UL (ref 150–450)
POTASSIUM SERPL-SCNC: 4 MMOL/L (ref 3.5–5.3)
PROCALCITONIN SERPL-MCNC: 0.41 NG/ML
PROT SERPL-MCNC: 7 G/DL (ref 6.4–8.2)
PROTHROMBIN TIME: 16.1 SECONDS (ref 9.8–12.4)
RBC # BLD AUTO: 2.33 X10*6/UL (ref 4.5–5.9)
RBC # FLD AUTO: ABNORMAL /UL
RETICS #: 0.34 X10*6/UL (ref 0.02–0.12)
RETICS/RBC NFR AUTO: 14.5 % (ref 0.5–2)
RH FACTOR (ANTIGEN D): NORMAL
SODIUM SERPL-SCNC: 135 MMOL/L (ref 136–145)
TOTAL CELLS COUNTED SNV: 100
VANCOMYCIN SERPL-MCNC: 2.4 UG/ML (ref 5–20)
WBC # BLD AUTO: 16.9 X10*3/UL (ref 4.4–11.3)
WBC # FLD AUTO: ABNORMAL /UL

## 2025-05-09 PROCEDURE — 2500000004 HC RX 250 GENERAL PHARMACY W/ HCPCS (ALT 636 FOR OP/ED): Mod: JZ

## 2025-05-09 PROCEDURE — 99233 SBSQ HOSP IP/OBS HIGH 50: CPT

## 2025-05-09 PROCEDURE — 2500000004 HC RX 250 GENERAL PHARMACY W/ HCPCS (ALT 636 FOR OP/ED): Mod: JZ,TB

## 2025-05-09 PROCEDURE — 82330 ASSAY OF CALCIUM: CPT

## 2025-05-09 PROCEDURE — 73219 MRI UPPER EXTREMITY W/DYE: CPT | Mod: RT

## 2025-05-09 PROCEDURE — 84145 PROCALCITONIN (PCT): CPT

## 2025-05-09 PROCEDURE — 80202 ASSAY OF VANCOMYCIN: CPT

## 2025-05-09 PROCEDURE — 2500000001 HC RX 250 WO HCPCS SELF ADMINISTERED DRUGS (ALT 637 FOR MEDICARE OP): Performed by: ANESTHESIOLOGY

## 2025-05-09 PROCEDURE — 86901 BLOOD TYPING SEROLOGIC RH(D): CPT

## 2025-05-09 PROCEDURE — 2500000001 HC RX 250 WO HCPCS SELF ADMINISTERED DRUGS (ALT 637 FOR MEDICARE OP): Performed by: PHARMACIST

## 2025-05-09 PROCEDURE — 3700000001 HC GENERAL ANESTHESIA TIME - INITIAL BASE CHARGE

## 2025-05-09 PROCEDURE — 73220 MRI UPPR EXTREMITY W/O&W/DYE: CPT | Mod: RT

## 2025-05-09 PROCEDURE — 3700000002 HC GENERAL ANESTHESIA TIME - EACH INCREMENTAL 1 MINUTE

## 2025-05-09 PROCEDURE — 05H833Z INSERTION OF INFUSION DEVICE INTO LEFT AXILLARY VEIN, PERCUTANEOUS APPROACH: ICD-10-PCS | Performed by: HOSPITALIST

## 2025-05-09 PROCEDURE — 85025 COMPLETE CBC W/AUTO DIFF WBC: CPT

## 2025-05-09 PROCEDURE — A9575 INJ GADOTERATE MEGLUMI 0.1ML: HCPCS | Mod: JZ | Performed by: INTERNAL MEDICINE

## 2025-05-09 PROCEDURE — 36415 COLL VENOUS BLD VENIPUNCTURE: CPT

## 2025-05-09 PROCEDURE — 7100000001 HC RECOVERY ROOM TIME - INITIAL BASE CHARGE

## 2025-05-09 PROCEDURE — 2500000001 HC RX 250 WO HCPCS SELF ADMINISTERED DRUGS (ALT 637 FOR MEDICARE OP)

## 2025-05-09 PROCEDURE — 85610 PROTHROMBIN TIME: CPT

## 2025-05-09 PROCEDURE — 2500000004 HC RX 250 GENERAL PHARMACY W/ HCPCS (ALT 636 FOR OP/ED): Mod: JZ | Performed by: ANESTHESIOLOGIST ASSISTANT

## 2025-05-09 PROCEDURE — 73220 MRI UPPR EXTREMITY W/O&W/DYE: CPT | Mod: RIGHT SIDE | Performed by: STUDENT IN AN ORGANIZED HEALTH CARE EDUCATION/TRAINING PROGRAM

## 2025-05-09 PROCEDURE — 36410 VNPNXR 3YR/> PHY/QHP DX/THER: CPT

## 2025-05-09 PROCEDURE — 1170000001 HC PRIVATE ONCOLOGY ROOM DAILY

## 2025-05-09 PROCEDURE — 83615 LACTATE (LD) (LDH) ENZYME: CPT

## 2025-05-09 PROCEDURE — 86920 COMPATIBILITY TEST SPIN: CPT

## 2025-05-09 PROCEDURE — 80053 COMPREHEN METABOLIC PANEL: CPT

## 2025-05-09 PROCEDURE — 2500000005 HC RX 250 GENERAL PHARMACY W/O HCPCS: Performed by: PHARMACIST

## 2025-05-09 PROCEDURE — 2500000004 HC RX 250 GENERAL PHARMACY W/ HCPCS (ALT 636 FOR OP/ED)

## 2025-05-09 PROCEDURE — 2500000004 HC RX 250 GENERAL PHARMACY W/ HCPCS (ALT 636 FOR OP/ED): Mod: JZ,TB | Performed by: ANESTHESIOLOGY

## 2025-05-09 PROCEDURE — C1751 CATH, INF, PER/CENT/MIDLINE: HCPCS

## 2025-05-09 PROCEDURE — 85045 AUTOMATED RETICULOCYTE COUNT: CPT

## 2025-05-09 PROCEDURE — 2780000003 HC OR 278 NO HCPCS

## 2025-05-09 PROCEDURE — 7100000002 HC RECOVERY ROOM TIME - EACH INCREMENTAL 1 MINUTE

## 2025-05-09 PROCEDURE — 2550000001 HC RX 255 CONTRASTS: Mod: JZ | Performed by: INTERNAL MEDICINE

## 2025-05-09 RX ORDER — LIDOCAINE HYDROCHLORIDE 20 MG/ML
INJECTION, SOLUTION INFILTRATION; PERINEURAL AS NEEDED
Status: DISCONTINUED | OUTPATIENT
Start: 2025-05-09 | End: 2025-05-09

## 2025-05-09 RX ORDER — ACETAMINOPHEN 325 MG/1
650 TABLET ORAL ONCE
Status: COMPLETED | OUTPATIENT
Start: 2025-05-09 | End: 2025-05-09

## 2025-05-09 RX ORDER — OXYCODONE HYDROCHLORIDE 10 MG/1
10 TABLET ORAL EVERY 4 HOURS PRN
Status: DISCONTINUED | OUTPATIENT
Start: 2025-05-09 | End: 2025-05-10 | Stop reason: HOSPADM

## 2025-05-09 RX ORDER — LIDOCAINE HYDROCHLORIDE 10 MG/ML
0.1 INJECTION, SOLUTION INFILTRATION; PERINEURAL ONCE
Status: DISCONTINUED | OUTPATIENT
Start: 2025-05-09 | End: 2025-05-10 | Stop reason: HOSPADM

## 2025-05-09 RX ORDER — ROCURONIUM BROMIDE 10 MG/ML
INJECTION, SOLUTION INTRAVENOUS AS NEEDED
Status: DISCONTINUED | OUTPATIENT
Start: 2025-05-09 | End: 2025-05-09

## 2025-05-09 RX ORDER — VANCOMYCIN 2 GRAM/500 ML IN 0.9 % SODIUM CHLORIDE INTRAVENOUS
2000 EVERY 8 HOURS
Status: DISCONTINUED | OUTPATIENT
Start: 2025-05-10 | End: 2025-05-12

## 2025-05-09 RX ORDER — METHOCARBAMOL 100 MG/ML
1000 INJECTION, SOLUTION INTRAMUSCULAR; INTRAVENOUS ONCE AS NEEDED
Status: ACTIVE | OUTPATIENT
Start: 2025-05-09 | End: 2025-05-09

## 2025-05-09 RX ORDER — PROPOFOL 10 MG/ML
INJECTION, EMULSION INTRAVENOUS AS NEEDED
Status: DISCONTINUED | OUTPATIENT
Start: 2025-05-09 | End: 2025-05-09

## 2025-05-09 RX ORDER — MIDAZOLAM HYDROCHLORIDE 1 MG/ML
INJECTION INTRAMUSCULAR; INTRAVENOUS AS NEEDED
Status: DISCONTINUED | OUTPATIENT
Start: 2025-05-09 | End: 2025-05-09

## 2025-05-09 RX ORDER — HYDRALAZINE HYDROCHLORIDE 20 MG/ML
5 INJECTION INTRAMUSCULAR; INTRAVENOUS EVERY 30 MIN PRN
Status: DISCONTINUED | OUTPATIENT
Start: 2025-05-09 | End: 2025-05-10 | Stop reason: HOSPADM

## 2025-05-09 RX ORDER — ACETAMINOPHEN 325 MG/1
975 TABLET ORAL EVERY 6 HOURS
Status: DISCONTINUED | OUTPATIENT
Start: 2025-05-09 | End: 2025-05-10 | Stop reason: HOSPADM

## 2025-05-09 RX ORDER — LABETALOL HYDROCHLORIDE 5 MG/ML
5 INJECTION, SOLUTION INTRAVENOUS EVERY 5 MIN PRN
Status: DISCONTINUED | OUTPATIENT
Start: 2025-05-09 | End: 2025-05-10 | Stop reason: HOSPADM

## 2025-05-09 RX ORDER — OXYCODONE HYDROCHLORIDE 5 MG/1
5 TABLET ORAL EVERY 4 HOURS PRN
Status: DISCONTINUED | OUTPATIENT
Start: 2025-05-09 | End: 2025-05-10 | Stop reason: HOSPADM

## 2025-05-09 RX ORDER — GADOTERATE MEGLUMINE 376.9 MG/ML
15 INJECTION INTRAVENOUS
Status: COMPLETED | OUTPATIENT
Start: 2025-05-09 | End: 2025-05-09

## 2025-05-09 RX ORDER — ALBUTEROL SULFATE 0.83 MG/ML
2.5 SOLUTION RESPIRATORY (INHALATION) ONCE AS NEEDED
Status: DISCONTINUED | OUTPATIENT
Start: 2025-05-09 | End: 2025-05-10 | Stop reason: HOSPADM

## 2025-05-09 RX ORDER — MEPERIDINE HYDROCHLORIDE 25 MG/ML
12.5 INJECTION INTRAMUSCULAR; INTRAVENOUS; SUBCUTANEOUS EVERY 10 MIN PRN
Status: DISCONTINUED | OUTPATIENT
Start: 2025-05-09 | End: 2025-05-10 | Stop reason: HOSPADM

## 2025-05-09 RX ORDER — MIDAZOLAM HYDROCHLORIDE 1 MG/ML
1 INJECTION INTRAMUSCULAR; INTRAVENOUS ONCE AS NEEDED
Status: COMPLETED | OUTPATIENT
Start: 2025-05-09 | End: 2025-05-10

## 2025-05-09 RX ORDER — FENTANYL CITRATE 50 UG/ML
INJECTION, SOLUTION INTRAMUSCULAR; INTRAVENOUS AS NEEDED
Status: DISCONTINUED | OUTPATIENT
Start: 2025-05-09 | End: 2025-05-09

## 2025-05-09 RX ORDER — LIDOCAINE HYDROCHLORIDE 10 MG/ML
5 INJECTION, SOLUTION INFILTRATION; PERINEURAL ONCE
Status: DISCONTINUED | OUTPATIENT
Start: 2025-05-09 | End: 2025-05-12

## 2025-05-09 RX ORDER — PHENYLEPHRINE HCL IN 0.9% NACL 0.4MG/10ML
SYRINGE (ML) INTRAVENOUS AS NEEDED
Status: DISCONTINUED | OUTPATIENT
Start: 2025-05-09 | End: 2025-05-09

## 2025-05-09 RX ORDER — SODIUM CHLORIDE, SODIUM LACTATE, POTASSIUM CHLORIDE, CALCIUM CHLORIDE 600; 310; 30; 20 MG/100ML; MG/100ML; MG/100ML; MG/100ML
100 INJECTION, SOLUTION INTRAVENOUS CONTINUOUS
Status: DISCONTINUED | OUTPATIENT
Start: 2025-05-09 | End: 2025-05-10 | Stop reason: HOSPADM

## 2025-05-09 RX ADMIN — HYDROMORPHONE HYDROCHLORIDE 6 MG: 2 INJECTION, SOLUTION INTRAMUSCULAR; INTRAVENOUS; SUBCUTANEOUS at 09:23

## 2025-05-09 RX ADMIN — MIDAZOLAM HYDROCHLORIDE 2 MG: 2 INJECTION, SOLUTION INTRAMUSCULAR; INTRAVENOUS at 13:20

## 2025-05-09 RX ADMIN — PIPERACILLIN SODIUM AND TAZOBACTAM SODIUM 3.38 G: 3; .375 INJECTION, SOLUTION INTRAVENOUS at 09:24

## 2025-05-09 RX ADMIN — GADOTERATE MEGLUMINE 15 ML: 376.9 INJECTION INTRAVENOUS at 14:46

## 2025-05-09 RX ADMIN — ROCURONIUM BROMIDE 50 MG: 10 INJECTION, SOLUTION INTRAVENOUS at 13:17

## 2025-05-09 RX ADMIN — HYDROMORPHONE HYDROCHLORIDE 6 MG: 2 INJECTION, SOLUTION INTRAMUSCULAR; INTRAVENOUS; SUBCUTANEOUS at 01:19

## 2025-05-09 RX ADMIN — ACETAMINOPHEN 975 MG: 325 TABLET ORAL at 18:14

## 2025-05-09 RX ADMIN — HYDROMORPHONE HYDROCHLORIDE 6 MG: 2 INJECTION, SOLUTION INTRAMUSCULAR; INTRAVENOUS; SUBCUTANEOUS at 23:21

## 2025-05-09 RX ADMIN — SUGAMMADEX 200 MG: 100 INJECTION, SOLUTION INTRAVENOUS at 15:22

## 2025-05-09 RX ADMIN — PIPERACILLIN SODIUM AND TAZOBACTAM SODIUM 3.38 G: 3; .375 INJECTION, SOLUTION INTRAVENOUS at 23:24

## 2025-05-09 RX ADMIN — HYDROMORPHONE HYDROCHLORIDE 0.5 MG: 1 INJECTION, SOLUTION INTRAMUSCULAR; INTRAVENOUS; SUBCUTANEOUS at 15:38

## 2025-05-09 RX ADMIN — SALINE NASAL SPRAY 1 SPRAY: 1.5 SOLUTION NASAL at 18:18

## 2025-05-09 RX ADMIN — ACETAMINOPHEN 975 MG: 325 TABLET ORAL at 21:41

## 2025-05-09 RX ADMIN — VANCOMYCIN HYDROCHLORIDE 1250 MG: 1.25 INJECTION, POWDER, LYOPHILIZED, FOR SOLUTION INTRAVENOUS at 17:00

## 2025-05-09 RX ADMIN — FENTANYL CITRATE 100 MCG: 50 INJECTION, SOLUTION INTRAMUSCULAR; INTRAVENOUS at 13:17

## 2025-05-09 RX ADMIN — HYDROMORPHONE HYDROCHLORIDE 0.5 MG: 1 INJECTION, SOLUTION INTRAMUSCULAR; INTRAVENOUS; SUBCUTANEOUS at 16:07

## 2025-05-09 RX ADMIN — FOLIC ACID 1 MG: 1 TABLET ORAL at 09:24

## 2025-05-09 RX ADMIN — Medication 240 MCG: at 13:33

## 2025-05-09 RX ADMIN — PROPOFOL 50 MG: 10 INJECTION, EMULSION INTRAVENOUS at 14:33

## 2025-05-09 RX ADMIN — LIDOCAINE 4% 2 PATCH: 40 PATCH TOPICAL at 09:24

## 2025-05-09 RX ADMIN — SODIUM CHLORIDE, POTASSIUM CHLORIDE, SODIUM LACTATE AND CALCIUM CHLORIDE: 600; 310; 30; 20 INJECTION, SOLUTION INTRAVENOUS at 13:11

## 2025-05-09 RX ADMIN — ACETAMINOPHEN 650 MG: 325 TABLET ORAL at 04:52

## 2025-05-09 RX ADMIN — HYDROMORPHONE HYDROCHLORIDE 6 MG: 2 INJECTION, SOLUTION INTRAMUSCULAR; INTRAVENOUS; SUBCUTANEOUS at 04:52

## 2025-05-09 RX ADMIN — HYDROMORPHONE HYDROCHLORIDE 6 MG: 2 INJECTION, SOLUTION INTRAMUSCULAR; INTRAVENOUS; SUBCUTANEOUS at 17:03

## 2025-05-09 RX ADMIN — LIDOCAINE HYDROCHLORIDE 100 MG: 20 INJECTION, SOLUTION INFILTRATION; PERINEURAL at 13:17

## 2025-05-09 RX ADMIN — PROPOFOL 150 MG: 10 INJECTION, EMULSION INTRAVENOUS at 13:17

## 2025-05-09 RX ADMIN — SALINE NASAL SPRAY 1 SPRAY: 1.5 SOLUTION NASAL at 20:12

## 2025-05-09 RX ADMIN — VANCOMYCIN HYDROCHLORIDE 1250 MG: 1.25 INJECTION, POWDER, LYOPHILIZED, FOR SOLUTION INTRAVENOUS at 00:54

## 2025-05-09 RX ADMIN — HYDROMORPHONE HYDROCHLORIDE 6 MG: 2 INJECTION, SOLUTION INTRAMUSCULAR; INTRAVENOUS; SUBCUTANEOUS at 20:10

## 2025-05-09 RX ADMIN — PIPERACILLIN SODIUM AND TAZOBACTAM SODIUM 3.38 G: 3; .375 INJECTION, SOLUTION INTRAVENOUS at 18:14

## 2025-05-09 RX ADMIN — HYDROMORPHONE HYDROCHLORIDE 6 MG: 2 INJECTION, SOLUTION INTRAMUSCULAR; INTRAVENOUS; SUBCUTANEOUS at 11:34

## 2025-05-09 ASSESSMENT — COGNITIVE AND FUNCTIONAL STATUS - GENERAL
DAILY ACTIVITIY SCORE: 23
DAILY ACTIVITIY SCORE: 23
MOBILITY SCORE: 24
MOBILITY SCORE: 24
DRESSING REGULAR LOWER BODY CLOTHING: A LITTLE
DRESSING REGULAR LOWER BODY CLOTHING: A LITTLE

## 2025-05-09 ASSESSMENT — PAIN SCALES - GENERAL
PAINLEVEL_OUTOF10: 7
PAINLEVEL_OUTOF10: 9
PAINLEVEL_OUTOF10: 10 - WORST POSSIBLE PAIN
PAINLEVEL_OUTOF10: 7
PAINLEVEL_OUTOF10: 7
PAINLEVEL_OUTOF10: 0 - NO PAIN
PAINLEVEL_OUTOF10: 7
PAINLEVEL_OUTOF10: 9
PAINLEVEL_OUTOF10: 6
PAINLEVEL_OUTOF10: 10 - WORST POSSIBLE PAIN
PAINLEVEL_OUTOF10: 9
PAINLEVEL_OUTOF10: 9
PAINLEVEL_OUTOF10: 8

## 2025-05-09 ASSESSMENT — PAIN - FUNCTIONAL ASSESSMENT
PAIN_FUNCTIONAL_ASSESSMENT: 0-10
PAIN_FUNCTIONAL_ASSESSMENT: UNABLE TO SELF-REPORT
PAIN_FUNCTIONAL_ASSESSMENT: 0-10

## 2025-05-09 NOTE — ANESTHESIA PROCEDURE NOTES
Airway  Date/Time: 5/9/2025 1:19 PM  Reason: elective    Airway not difficult    Staffing  Performed: SABINE and attending   Authorized by: Derek Munroe MD PhD    Performed by: SABINE Préez  Patient location during procedure: OR    Patient Condition  Indications for airway management: anesthesia and airway protection  Patient position: sniffing  No planned trial extubation  Sedation level: deep     Final Airway Details   Preoxygenated: yes  Final airway type: endotracheal airway  Successful airway: ETT  Cuffed: yes   Successful intubation technique: video laryngoscopy  Adjuncts used in placement: intubating stylet  Blade: Angel  Blade size: #4  ETT size (mm): 7.5  Cormack-Lehane Classification: grade I - full view of glottis  Placement verified by: chest auscultation and capnometry   Measured from: teeth  Number of attempts at approach: 1

## 2025-05-09 NOTE — ANESTHESIA PREPROCEDURE EVALUATION
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 Status   baclofen (Lioresal) 5 mg tablet [id number] No Take 1 tablet (5 mg) by mouth 3 times a day.   Patient not taking: Reported on 5/3/2025    RAVI Velazco Not Taking  10/06/24 1469   chlorhexidine (Hibiclens) 4 % external liquid [id number] No Apply topically once daily as needed for wound care. Use for 5 days prior to surgery as body wash, do not use on face or genital region   Patient not taking: Reported on 5/3/2025    Lindsay Vaca PA-C Not Taking Active   folic acid (Folvite) 1 mg tablet [id number] Yes Take 1 tablet (1 mg) by mouth once daily. RAVI Cannon 2025 Active   oxyCODONE (Roxicodone) 20 mg immediate release tablet [id number] No Take 1 tablet (20 mg) by mouth every 8 hours if needed for severe pain (7 - 10).   Patient not taking: Reported on 5/3/2025    Ian Cruz MD Not Taking Active   pseudoephedrine (Sudogest) 60 mg tablet [id number] No Take 1 tablet (60 mg) by mouth every 8 hours if needed for congestion for up to 10 days.   Patient not taking: Reported on 5/3/2025    RAVI Velazco Not Taking   "11/02/24 2359                    Visit Vitals  /71   Pulse 94   Temp 37 °C (98.6 °F) (Temporal)   Resp 18   Ht 1.88 m (6' 2\")   Wt 72.6 kg (160 lb)   SpO2 99%   BMI 20.54 kg/m²   Smoking Status Every Day   BSA 1.95 m²            5/8/2025     7:49 AM 5/8/2025    11:57 AM 5/8/2025     6:04 PM 5/8/2025     9:09 PM 5/8/2025    11:10 PM 5/9/2025     4:28 AM 5/9/2025    11:42 AM   Vitals   Systolic 149 133 113 145 132 132 108   Diastolic 79 67 66 74 70 69 71   BP Location Left arm Left arm Left arm Left arm Left arm Left arm    Heart Rate 95 91 112 98 95 98 94   Temp 38.3 °C (100.9 °F) 37 °C (98.6 °F) 38.6 °C (101.5 °F) 37.7 °C (99.8 °F) 36.8 °C (98.2 °F) 38.2 °C (100.7 °F) 37 °C (98.6 °F)   Resp 18 18 18 18 18 18 18        Recent Labs:    Chemistry    Lab Results   Component Value Date/Time     (L) 05/09/2025 0743    K 4.0 05/09/2025 0743    CL 98 05/09/2025 0743    CO2 26 05/09/2025 0743    BUN 8 05/09/2025 0743    CREATININE 0.68 05/09/2025 0743    Lab Results   Component Value Date/Time    CALCIUM 9.6 05/09/2025 0743    ALKPHOS 151 (H) 05/09/2025 0743    AST 78 (H) 05/09/2025 0743    ALT 48 05/09/2025 0743    BILITOT 26.1 (H) 05/09/2025 0743          Lab Results   Component Value Date/Time    WBC 16.9 (H) 05/09/2025 0743    HGB 7.2 (L) 05/09/2025 0743    HCT 19.2 (L) 05/09/2025 0743     05/09/2025 0743     Lab Results   Component Value Date/Time    PROTIME 16.1 (H) 05/09/2025 0743    INR 1.5 (H) 05/09/2025 0743       Electrocardiogram:  Encounter Date: 05/03/25   Electrocardiogram, 12-lead PRN ACS symptoms   Result Value    Ventricular Rate 73    Atrial Rate 73    DE Interval 174    QRS Duration 94    QT Interval 378    QTC Calculation(Bazett) 416    R Axis 47    T Axis -59    QRS Count 12    Q Onset 214    P Onset 127    P Offset 189    T Offset 403    QTC Fredericia 403    Narrative    Unusual P axis, possible ectopic atrial rhythm  Minimal voltage criteria for LVH, may be normal " variant  Nonspecific ST and T wave abnormality  Abnormal ECG  When compared with ECG of 03-MAY-2025 06:41,  ST no longer depressed in Inferior leads  T wave inversion less evident in Inferior leads  Confirmed by Tavares Christianson ([id number]) on 5/8/2025 8:40:30 AM       Echocardiogram:  Results for orders placed during the hospital encounter of 05/03/25    Transthoracic Echo Complete    Narrative  [facility], [address] [place] [address]  Tel [phone] and Fax [phone]    TRANSTHORACIC ECHOCARDIOGRAM REPORT      Patient Name:       AMARIS BLANCO     Reading Physician:    [id number] Dipti Carpenter MD  Study Date:         5/6/2025            Ordering Provider:    [id number] LINDY ARCEO    CONCLUSIONS:  1. Left ventricular ejection fraction is normal, calculated by Corado's biplane at 63%.  2. Left ventricular cavity size is severely dilated.  3. There is normal right ventricular global systolic function.  4. Mildly enlarged right ventricle.  5. The left atrial size is moderately dilated.  6. Compared with study dated 8/24/2024, no significant change Note the prior exam included an agitated saline contrast study that demonstrated delayed appearance of contrast on the left side consistent with intrapulmonary shunting. No agitated saline contrast study was done today.      Anesthesia History: No anesthesia complications in patient or family   Anesthesia Plan: GA/ETT, standard monitors     I discussed the anesthesia plan with the patient and/or family and reviewed the risks, benefits and alternatives. Agree to proceed.  Derek Munroe MD PhD

## 2025-05-09 NOTE — PROGRESS NOTES
Vancomycin Dosing by Pharmacy- FOLLOW UP    Joellen Narayan is a 24 y.o. year old male who Pharmacy has been consulted for vancomycin dosing for osteomyelitis/septic arthritis. Based on the patient's indication and renal status this patient is being dosed based on a goal AUC of 400-600.     Renal function is currently stable.    Current vancomycin dose: 1250 mg given every 12 hours    Most recent random level: 2.4 mcg/mL    Visit Vitals  /78   Pulse (!) 117   Temp (!) 38 °C (100.4 °F) (Temporal)   Resp 14        Lab Results   Component Value Date    CREATININE 0.68 2025    CREATININE 0.81 2025    CREATININE 0.59 2025    CREATININE 0.60 2025        Patient weight is as follows:   Vitals:    25 0639   Weight: 72.6 kg (160 lb)       Cultures:  Susceptibility data for the encounter in last 14 days.  Collected Specimen Info Organism   25 Fluid from SPUTUM Normal throat raulito        I/O last 3 completed shifts:  In: 1403.8 (19.3 mL/kg) [I.V.:553.8 (7.6 mL/kg); IV Piggyback:850]  Out: 1125 (15.5 mL/kg) [Urine:1125 (0.4 mL/kg/hr)]  Weight: 72.6 kg   I/O during current shift:  No intake/output data recorded.    Temp (24hrs), Av.5 °C (99.5 °F), Min:36.8 °C (98.2 °F), Max:38.2 °C (100.7 °F)      Assessment/Plan    Below goal AUC. Orders placed for new vancomcyin regimen of 2000 mg  every 8 hours to begin at 0100 5/10.     This dosing regimen is predicted by [employer] to result in the following pharmacokinetic parameters:  Loading dose: N/A  Regimen: 2000 mg IV every 8 hours.  Start time: 05:00 on 05/10/2025  Exposure target: AUC24 (range)400-600 mg/L.hr   [id number]: 541 mg/L.hr  AUC24,ss: 556 mg/L.hr  Probability of AUC24 > 400: 96 %    The next level will be obtained on 5/10 at 0500. May be obtained sooner if clinically indicated.   Will continue to monitor renal function daily while on vancomycin and order serum creatinine at least every 48 hours if not already ordered.  Follow  for continued vancomycin needs, clinical response, and signs/symptoms of toxicity.       KAREN CALDWELL, PharmD

## 2025-05-09 NOTE — SIGNIFICANT EVENT
Orthopaedic Surgery Plan of Care Update    Due to OR availability, this patient's surgery will be postponed until tomorrow 05/10/25.     Plan:  -OK for diet today from ortho perspective  -Please make patient NPO after midnight  -Please ensure that type and screen will be up to date at the time of surgery (these  after 72 hours)  -Please ensure all preoperative labs are complete: BMP, CBC, Type & Screen, Coags (PT/INR, PTT), CXR, EKG  -Please document patient's continued readiness for OR when able     Patient will be followed by the Orthopaedic Trauma Team:  1st Call: Loree Pastrana  2nd Call: Ramez Garcia  3rd Call: Linn Recinos  Available via [employer]  Pager: [phone]

## 2025-05-09 NOTE — NURSING NOTE
Raghav consulted by bedside RN for PIV placement. Raghav RN to bedside to assess. Patient has right upper extremity edema, extremity not appropriate for IV placement at this time. x2 attempts on left extremity, unsuccessful. Alternative access would be beneficial to complete this patient's plan of care. Patient has a history of sickle cell and cancer with chemo treatment, which contributes to patient's overall poor vasculature. Bedside RN notified.

## 2025-05-09 NOTE — PROGRESS NOTES
"Joellen Narayan is a 24 y.o. male on day 6 of admission presenting with Sickle cell pain crisis (Multi).    Subjective   Patient not present in room because he was in MRI. Later in the day patient was in OR. Unable to assess and discuss subjective assessment since patient out of his room all day        Objective     Physical Exam    Last Recorded Vitals  Blood pressure 108/71, pulse 94, temperature 37 °C (98.6 °F), temperature source Temporal, resp. rate 18, height 1.88 m (6' 2\"), weight 72.6 kg (160 lb), SpO2 99%.  Intake/Output last 3 Shifts:  I/O last 3 completed shifts:  In: 603.8 (8.3 mL/kg) [I.V.:553.8 (7.6 mL/kg); IV Piggyback:50]  Out: 700 (9.6 mL/kg) [Urine:700 (0.3 mL/kg/hr)]  Weight: 72.6 kg     Relevant Results                              Assessment & Plan  Sickle cell pain crisis (Multi)    Septic bursitis of elbow, right    Joellen Narayan 24 y.o. 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 Patient initially 98% RA, however became hypoxic in ED requiring 2-4 liters, unclear etiology for hypoxia at this time, possibly related to poor inspiratory effort in setting of severe pain vs ACS (low suspicion at this time, however close monitoring) vs infectious process. Multiple CXR w/o evidence of acute process. 5/3 CT PE negative, similar GGOs from prior scans unchanged. Pt asymptomatic, denies respiratory s/s. S/p Zosyn (5/3-5/4). 5/5 Pt reports chest and back pain improved however R arm becoming progressively more severe requiring increasing opioids, initially pt declining MRI R arm but then agreeable. Pt unable to tolerate MRI 5/7. 5/7 Xray showing large R " elbow joint effusion. 5/8 Pt became febrile x3, CRP/WBC/hemolysis labs significantly worsening, Ortho consulted c/f osteo vs septic arthritis, s/p joint aspiration, recs pending. Started Zosyn/Vanc (5/8- ). Underwent MRI under anesthesia 5/9-results pending. Plan for washout in OR 5/9     # VERÓNICA pain/edema c/f osteomyelitis vs septic arthritis   - original etiology sickle cell related vs AVN vs osteomyelitis vs thrombus vs septic arthritis    - no imaging obtained on admit, pt states acute pain feels like sickle cell pain 5/6   - Jan 2025 there was ? hx of OM of lower extremities vs chronic SCD changes but no surgical interventions required at time therefore low threshold for MRI during this admission  - 5/3 EKG NSR, no ST elevation appears similar to prior   - RUE US neg for DVT (5/5)   - ESR/ CRP <1/5.16 (5/4) --> <1/7.85 (5/5) --> <1, 6.15 (5/6) --> CRP 16.01 (5/8)  - 5/5 offered MRI R arm pt declining at this time even when offered with premedication  - attempted (5/7) MRI R arm (pt did not tolerate exam)  - 5/7 Xray R arm showing large elbow joint effusion   - 5/8 pt became febrile x3, highest 38.8 s/p tylenol 650mg once   - started Zosyn/Vanc (5/8-  ) and IVF x 48hr  - 5/8 Ortho consulted, s/p aspirate (>44k cells), formal recs pending   - MRI w/anesthesia 5/9-results pending   - Plan for OR with washout 5/9  - Will need to consult ID post washout   - s/p increased to 5.5mg IVP dilaudid q2 PRN (5/5), offered dPC pt declining at this time as well   - increased to 6mg IVP dilaudid q2h prn for severe pain (5/6-current)     # AHRF, requiring 2-4L on admission  - On 2-3L at night, however patient reports daytime hypoxia as well in the past, currently on 3L 5/8  - unclear at this time, etiology includes acute chest vs poor inspiratory effort in setting of pain vs pneumonia vs PE vs pulm htn  - 5/3 Cxr w/o evidence of acute process   - 5/3 CT PE w/o evidence of PE, similar GGOs from prior likely atelectasis (similar  to Dec 2024)   - strep pneum, legionella negative 5/4   - 5/5 ECHO EF 63%, LV severely dilated   - trop 13 (5/5)   - 5/5 repeat CXR neg for acute process, 5/8 CXR w/o evidence acute process   - c/w continuous pulse oximetry  - Continue supportive O2 with NC  - 5/5 EKG NSR  - Flu A/B, covid neg (5/5)  - respx cx 5/4 negative thus far  - low c/f infectious process at this time, empirix atbx discontinued 5/4 per Dr. Correia      # Hgb SS disease   # hx acute chest syndrome   - OARRS reviewed on admission, no aberrancies noted   - Hgb baseline: 9-10, 8.1 on admission, (5/5) Hg 7.5, in setting of worsening hemolysis and pain, s/p 1u pRBC --> Hgb 7.6 (5/6) --> Hgb 8.6 (5/7)  - T.bili: 9.5 LDH: 779 Retic: 13.1 --> (5/7) tbili 11, , retic 17.7%  - leukocytosis noted (5/7) WBC 12.4, likely reactive as pt afebrile with no other s/sx of infectious process  - Admission, s/p initiating 4mg dilaudid q2h prn, IV toradol 15mg q6h x 3 days, lidocaine patch, flexeril as needed, tylenol   - c/w home folic acid daily  - utox pending   - Exchange labs resulted 5/2-5/5, repeat ordered for 5/9   - 5/4 Hg S 92.8%  - s/p 5.5mg IVP dilaudid q2 PRN severe pain (5/5-5/6)   - start 6mg IV dilaudid q2h PRN for severe pain (5/6-current)  - bowel regimen for opioid induced constipation, zofran for opioid induced nausea, and benadrly for opioid induced pruritis       # hx choledocholithiasis 7/2024  - worsening hemolysis in setting of active infection could be contributing to increased hyperbilirubinemia   - July 2024 s/p ERCP with biliary sphincterotomy where sludge and stones were removed, achieving complete clearance   - 1/31/25 was planned for cholecystectomy with Dr. Dove but no show on day of surgery and again 2/13 and 2/27   - no RUQ pain on exam, however pt with leukocytosis, febrile  - blood cx x2 pending collection 5/8   - LFTs at baseline, bili  - tbili baseline ~7-9, (5/6) 10.6 --> (5/8) 18.2 (5/9) 26.1  - RUQ US showing  cholelithiasis with no cholecystitis as well as hepatomegaly and hepatic steatosis     - LR @ 75 x48 (5/8-5/10)     # Hx Priapism  - no active issues on admit   - hx previously drained by urology   - c/w home sudafed PRN     # Nodular lymphocyte predominant Hodgkins lymphoma (NLPHL)    - Follows with Dr. Stoll  - s/p Rituxan and prednisone q3 weeks (C1 1/18/24, C2 2/8/24, Missed C3 d/t sickle cell pain crisis, C4 4/4/24, C5 5/16/24, C6 6/7/24)   - 3/13 No show to onc appt   - 4/9 PET showed interval development of new FDG focus in mid abdomen  - will need onc appt scheduled prior to discharge      # Hx of nocturnal oxygen dependence  - baseline 2-3 L overnight  - Pulm outpatient appt scheduled back in February  - Will need pulm outpatient scheduling     # prophy  - Enoxaparin   - SCDs, encouraged ambulation      # dispo  - Code Status: Full Code (confirmed on admission)   - DC home resumed O2 pending infectious workup  - NOK: Melissa, mother, [phone] - updated via phone 5/8   - FUV 7/9 sickle cell clinic, onc & pulm FUV requested      I spent 60 minutes in the professional and overall care of this patient.      RAAD Pathak-CNP  Patient discussed with Dr. Parnell

## 2025-05-09 NOTE — PROGRESS NOTES
"Orthopaedic Surgery Progress Note    Subjective:  Tachy to 101.5 overnight. Endorses R elbow pain. Denies chest pain, shortness of breath, or fevers. Amenable to OR today for I and D R elbow. NPO since MN.    Objective:  /69 (BP Location: Left arm, Patient Position: Lying)   Pulse 98   Temp (!) 38.2 °C (100.7 °F) (Oral)   Resp 18   Ht 1.88 m (6' 2\")   Wt 72.6 kg (160 lb)   SpO2 97%   BMI 20.54 kg/m²     Gen: arousable, NAD, appropriately conversational  Cardiac: RRR to peripheral palpation  Resp: nonlabored on RA  GI: soft, nondistended    MSK:  RUE:   - skin intact. Tender at site of injury with painful ROM.  -Motor intact in axillary/AIN/PIN/ulnar distributions  -SILT axillary/radial/median/ulnar distributions  -Hand wwp, 2+ radial pulse, cap refill brisk  -Compartments soft and compressible, no pain with passive stretch of digits      Assessment/Plan:   Injury: R elbow SA w possible OM  HPI: 24M (Sickle cell w recurrent pain crises, Hodgkin’s lymphoma s/p chemo) adm for 5d w extremity pain c/f repeat crisis. Worsening R elbow pain x 1d. Febrile since this AM, ow VSS. NVI. R lateral elbow effusion, TTP, pain w short arcs. XR w/o fx. WBC 18.4, ESR 2, CRP 16.01. R elbow aspirated for 7cc serosanguinous fluid w ng crystals, 44.8k WBC, 95% PMN, cx pending. Consistent with right elbow septic arthritis.      In setting of sickle cell and duration of symptoms, patient needs a R elbow MRI prior to OR to evaluation for underlying osteomyelitis      Plan:   - NPO for upcoming surgery with orthopedics.  - Plan for sedated MRI today prior to OR  - Admitted to Heme-Onc, Clear for OR by primary team   - Consented and posted to OR schedule for R elbow I&D w/ orthopedic surgery on 5/9/25  - ROMAT RUE  - Pre-operative ABx: on vanc/zosyn, continue per ID; will obtain intra op cultures   - No indication for transfusion pre-operatively  - DVT PPx: SCDs, okay for chemoppx per primary     Consult seen and staffed within 30 " minutes of notification.     This consult was staffed with attending physician, Dr. Fitzgerald.    Loree Pastrana, PGY-1  Orthopaedic Surgery   [employer] Preferred      Patient will be followed by the Orthopaedic Trauma Team:  1st Call: Loree Pastrana  2nd Call: Ramez Garcia  3rd Call: Linn Recinos  Available via [employer]  Pager: [phone]

## 2025-05-09 NOTE — POST-PROCEDURE NOTE
Midline Insertion  Pre-Procedure Checklist:  Emergent Line Insertion: No  Type of Line to be Placed: Midline  Consent Obtained: Yes  Emergency Medication Necessary: No  Patient Identified with 2 Independent Identifiers: Yes  Review of Allergies, Anticoagulation, Relevant Labs, ECG/Telemetry: Yes  Risks/Benefits/Alternatives Discussed with Patient/POA/Legal Representative: Peripheral Access  Stop Sign on Door: Yes  Time Out Performed: Yes  Catheter Exchange: No    Positioning Checklist:  All People, Including Patient, in the Room with Cap and Mask: Yes  Fluoroscopy Used to Identify Vessel and Guide Insertion: No   Sterile Cover Used: Yes  Full Barrier Precautions Followed (Mask, Cap, Gown, Gloves): Yes  Hands Washed: Yes  Monitors Attached with Sound Alarms On: No  Full Body Sterile Drape (Head-to-Toe) Used to Cover Patient: Yes  Trendelenburg Position (For IJ and Subclavian): No  CHG Skin Prep Used and Allowed to Air Dry to Skin Procedure: Yes    Procedure Checklist:  Blood Aspirated From All Lumens, All Ports Subsequently Flushed: Yes  Catheter Caps Placed on All Lumens; Lumens Clamped: Yes  Maintain Guidewire Control Throughout, Ensuring Guidewire Removal: Yes  Maintain Sterile Field Throughout Insertion: Yes  Catheter Secured: Yes  Confirmatory Test of Venous Placement: Non-Pulsatile Blood    Post Procedure Checklist:  Date and Time Written on Dressing: Yes  Sharp and Wire Count and Safe Disposal of all Sharps/Wires: Yes  Sterile Dressing Applied Per Protocol: Yes  X-ray Ordered or ECG Image: N/A    Insertion Details:  Size (Fr): 3  Lumen Type: Single  Catheter to Vein Ratio Less Than 50%: Yes  Total Length (cm): 15  External Length (cm): 0  Orientation: Left  Location: Brachial  Site Prep: Chlorohexidine; Usual sterile procedure followed  Local Anesthetic: Injectable/Subcutaneous  Indication: Medications  Insertion Team Members in the Room: Nurse, LPN  Initial Extremity Circumference (cm): 26  Insertion  Attempts: 1  Patient Tolerance: Tolerated Well, Age Appropriate  Comfort Measures: Subcutaneous anesthetic; Verbal  Procedure Location: Bedside  Safety Measures: Patient specific safety measures addressed with RN  Estimated Blood Loss (mL): 0  Vessel Fully Compressible Proximally and Distally to Insertion Site: Yes  Brisk Blood Return Obtained and Line Draws Easily: Yes  Tip Location: Axillary Vein  Line Confirmation: Venous return  Lot #: [id number]  : Bard  Line Exp Date: 09/30/2025  Securement: Stat Lock  Post Procedure Checklist: Handoff with RN; Obtain all new IV tubing prior to use; Bed at lowest level and wheels locked; Line discharge information at bedside.  Additional Details: Line was inserted using Modified Seldinger Technique.   Placed by: KARENA Ochoa RN

## 2025-05-09 NOTE — ANESTHESIA POSTPROCEDURE EVALUATION
Patient: Joellen Narayan    Procedure Summary       Date: 05/09/25 Room / Location: [facility]    Anesthesia Start: 1311 Anesthesia Stop: 1542    Procedure: MR HUMERUS RIGHT W AND WO IV CONTRAST Diagnosis: (sickle cell, R arm pain/swelling/febrile c/f osteomyelitis)    Scheduled Providers: Derek Munroe MD PhD Responsible Provider: Derek Munroe MD PhD    Anesthesia Type: general ASA Status: 3            Anesthesia Type: general    Vitals Value Taken Time   /71 05/09/25 15:31   Temp  05/09/25 15:43   Pulse 110 05/09/25 15:38   Resp 16 05/09/25 15:38   SpO2 99 % 05/09/25 15:38   Vitals shown include unfiled device data.    Anesthesia Post Evaluation    Patient location during evaluation: PACU  Patient participation: complete - patient participated  Level of consciousness: awake  Pain management: adequate  Airway patency: patent  Cardiovascular status: acceptable  Respiratory status: acceptable  Hydration status: acceptable  Postoperative Nausea and Vomiting: none        No notable events documented.

## 2025-05-10 ENCOUNTER — SURGERY (OUTPATIENT)
Age: 25
End: 2025-05-10
Payer: COMMERCIAL

## 2025-05-10 ENCOUNTER — APPOINTMENT (OUTPATIENT)
Dept: RADIOLOGY | Facility: HOSPITAL | Age: 25
DRG: 981 | End: 2025-05-10
Payer: COMMERCIAL

## 2025-05-10 LAB
ALBUMIN SERPL BCP-MCNC: 3.7 G/DL (ref 3.4–5)
ALP SERPL-CCNC: 113 U/L (ref 33–120)
ALT SERPL W P-5'-P-CCNC: 39 U/L (ref 10–52)
ANION GAP SERPL CALC-SCNC: 9 MMOL/L (ref 10–20)
AST SERPL W P-5'-P-CCNC: 54 U/L (ref 9–39)
BASOPHILS # BLD AUTO: 0.03 X10*3/UL (ref 0–0.1)
BASOPHILS NFR BLD AUTO: 0.2 %
BILIRUB SERPL-MCNC: 22.3 MG/DL (ref 0–1.2)
BLOOD EXPIRATION DATE: NORMAL
BUN SERPL-MCNC: 6 MG/DL (ref 6–23)
CALCIUM SERPL-MCNC: 8.9 MG/DL (ref 8.6–10.6)
CHLORIDE SERPL-SCNC: 100 MMOL/L (ref 98–107)
CO2 SERPL-SCNC: 31 MMOL/L (ref 21–32)
CREAT SERPL-MCNC: 0.63 MG/DL (ref 0.5–1.3)
CRYSTALS FLD MICRO: NORMAL
DISPENSE STATUS: NORMAL
EGFRCR SERPLBLD CKD-EPI 2021: >90 ML/MIN/1.73M*2
EOSINOPHIL # BLD AUTO: 0.64 X10*3/UL (ref 0–0.7)
EOSINOPHIL NFR BLD AUTO: 4.3 %
ERYTHROCYTE [DISTWIDTH] IN BLOOD BY AUTOMATED COUNT: 19.7 % (ref 11.5–14.5)
GLUCOSE SERPL-MCNC: 87 MG/DL (ref 74–99)
HCT VFR BLD AUTO: 16 % (ref 41–52)
HGB BLD-MCNC: 5.9 G/DL (ref 13.5–17.5)
HGB RETIC QN: 27 PG (ref 28–38)
HSV1 DNA SKIN QL NAA+PROBE: NOT DETECTED
HSV2 DNA SKIN QL NAA+PROBE: NOT DETECTED
IMM GRANULOCYTES # BLD AUTO: 0.1 X10*3/UL (ref 0–0.7)
IMM GRANULOCYTES NFR BLD AUTO: 0.7 % (ref 0–0.9)
IMMATURE RETIC FRACTION: 20.9 %
LDH SERPL L TO P-CCNC: 596 U/L (ref 84–246)
LYMPHOCYTES # BLD AUTO: 2.06 X10*3/UL (ref 1.2–4.8)
LYMPHOCYTES NFR BLD AUTO: 13.9 %
MCH RBC QN AUTO: 30.3 PG (ref 26–34)
MCHC RBC AUTO-ENTMCNC: 36.9 G/DL (ref 32–36)
MCV RBC AUTO: 82 FL (ref 80–100)
MONOCYTES # BLD AUTO: 2.05 X10*3/UL (ref 0.1–1)
MONOCYTES NFR BLD AUTO: 13.8 %
NEUTROPHILS # BLD AUTO: 9.93 X10*3/UL (ref 1.2–7.7)
NEUTROPHILS NFR BLD AUTO: 67.1 %
NRBC BLD-RTO: 0.9 /100 WBCS (ref 0–0)
PLATELET # BLD AUTO: 274 X10*3/UL (ref 150–450)
POTASSIUM SERPL-SCNC: 3.7 MMOL/L (ref 3.5–5.3)
PRODUCT BLOOD TYPE: 1700
PRODUCT CODE: NORMAL
PROT SERPL-MCNC: 6.1 G/DL (ref 6.4–8.2)
RBC # BLD AUTO: 1.95 X10*6/UL (ref 4.5–5.9)
RETICS #: 0.33 X10*6/UL (ref 0.02–0.12)
RETICS/RBC NFR AUTO: 17.2 % (ref 0.5–2)
SODIUM SERPL-SCNC: 136 MMOL/L (ref 136–145)
UNIT ABO: NORMAL
UNIT NUMBER: NORMAL
UNIT RH: NORMAL
UNIT VOLUME: 282
VANCOMYCIN SERPL-MCNC: 17.8 UG/ML (ref 5–20)
VANCOMYCIN SERPL-MCNC: 37 UG/ML (ref 5–20)
WBC # BLD AUTO: 14.8 X10*3/UL (ref 4.4–11.3)
XM INTEP: NORMAL

## 2025-05-10 PROCEDURE — 99233 SBSQ HOSP IP/OBS HIGH 50: CPT

## 2025-05-10 PROCEDURE — 87529 HSV DNA AMP PROBE: CPT

## 2025-05-10 PROCEDURE — 2500000004 HC RX 250 GENERAL PHARMACY W/ HCPCS (ALT 636 FOR OP/ED): Performed by: STUDENT IN AN ORGANIZED HEALTH CARE EDUCATION/TRAINING PROGRAM

## 2025-05-10 PROCEDURE — 0RBL0ZZ EXCISION OF RIGHT ELBOW JOINT, OPEN APPROACH: ICD-10-PCS | Performed by: ORTHOPAEDIC SURGERY

## 2025-05-10 PROCEDURE — 24120 EXC/CRTG B1 CST/B9 TUM RDS: CPT | Performed by: ORTHOPAEDIC SURGERY

## 2025-05-10 PROCEDURE — 99222 1ST HOSP IP/OBS MODERATE 55: CPT | Performed by: STUDENT IN AN ORGANIZED HEALTH CARE EDUCATION/TRAINING PROGRAM

## 2025-05-10 PROCEDURE — 99222 1ST HOSP IP/OBS MODERATE 55: CPT | Performed by: ORTHOPAEDIC SURGERY

## 2025-05-10 PROCEDURE — 2500000004 HC RX 250 GENERAL PHARMACY W/ HCPCS (ALT 636 FOR OP/ED): Mod: JZ,TB

## 2025-05-10 PROCEDURE — 2500000005 HC RX 250 GENERAL PHARMACY W/O HCPCS: Performed by: ANESTHESIOLOGY

## 2025-05-10 PROCEDURE — 3600000002 HC OR TIME - INITIAL BASE CHARGE - PROCEDURE LEVEL TWO: Performed by: ORTHOPAEDIC SURGERY

## 2025-05-10 PROCEDURE — 80202 ASSAY OF VANCOMYCIN: CPT

## 2025-05-10 PROCEDURE — 2500000004 HC RX 250 GENERAL PHARMACY W/ HCPCS (ALT 636 FOR OP/ED): Mod: JZ,TB | Performed by: ANESTHESIOLOGY

## 2025-05-10 PROCEDURE — 2500000001 HC RX 250 WO HCPCS SELF ADMINISTERED DRUGS (ALT 637 FOR MEDICARE OP)

## 2025-05-10 PROCEDURE — 87102 FUNGUS ISOLATION CULTURE: CPT | Performed by: STUDENT IN AN ORGANIZED HEALTH CARE EDUCATION/TRAINING PROGRAM

## 2025-05-10 PROCEDURE — 3600000007 HC OR TIME - EACH INCREMENTAL 1 MINUTE - PROCEDURE LEVEL TWO: Performed by: ORTHOPAEDIC SURGERY

## 2025-05-10 PROCEDURE — 7100000001 HC RECOVERY ROOM TIME - INITIAL BASE CHARGE: Performed by: ORTHOPAEDIC SURGERY

## 2025-05-10 PROCEDURE — 0PBF0ZX EXCISION OF RIGHT HUMERAL SHAFT, OPEN APPROACH, DIAGNOSTIC: ICD-10-PCS | Performed by: ORTHOPAEDIC SURGERY

## 2025-05-10 PROCEDURE — 2500000004 HC RX 250 GENERAL PHARMACY W/ HCPCS (ALT 636 FOR OP/ED): Mod: JZ

## 2025-05-10 PROCEDURE — 1170000001 HC PRIVATE ONCOLOGY ROOM DAILY

## 2025-05-10 PROCEDURE — 24102 ARTHRT ELBOW W/SYNOVECTOMY: CPT | Performed by: ORTHOPAEDIC SURGERY

## 2025-05-10 PROCEDURE — 87070 CULTURE OTHR SPECIMN AEROBIC: CPT | Performed by: STUDENT IN AN ORGANIZED HEALTH CARE EDUCATION/TRAINING PROGRAM

## 2025-05-10 PROCEDURE — 0PBK0ZZ EXCISION OF RIGHT ULNA, OPEN APPROACH: ICD-10-PCS | Performed by: ORTHOPAEDIC SURGERY

## 2025-05-10 PROCEDURE — 84075 ASSAY ALKALINE PHOSPHATASE: CPT

## 2025-05-10 PROCEDURE — 2720000007 HC OR 272 NO HCPCS: Performed by: ORTHOPAEDIC SURGERY

## 2025-05-10 PROCEDURE — 20245 BONE BIOPSY OPEN DEEP: CPT | Performed by: ORTHOPAEDIC SURGERY

## 2025-05-10 PROCEDURE — 7100000002 HC RECOVERY ROOM TIME - EACH INCREMENTAL 1 MINUTE: Performed by: ORTHOPAEDIC SURGERY

## 2025-05-10 PROCEDURE — 2500000001 HC RX 250 WO HCPCS SELF ADMINISTERED DRUGS (ALT 637 FOR MEDICARE OP): Performed by: ANESTHESIOLOGY

## 2025-05-10 PROCEDURE — 3700000001 HC GENERAL ANESTHESIA TIME - INITIAL BASE CHARGE: Performed by: ORTHOPAEDIC SURGERY

## 2025-05-10 PROCEDURE — 36430 TRANSFUSION BLD/BLD COMPNT: CPT

## 2025-05-10 PROCEDURE — P9040 RBC LEUKOREDUCED IRRADIATED: HCPCS

## 2025-05-10 PROCEDURE — 3700000002 HC GENERAL ANESTHESIA TIME - EACH INCREMENTAL 1 MINUTE: Performed by: ORTHOPAEDIC SURGERY

## 2025-05-10 PROCEDURE — 2500000004 HC RX 250 GENERAL PHARMACY W/ HCPCS (ALT 636 FOR OP/ED): Mod: JZ | Performed by: ANESTHESIOLOGY

## 2025-05-10 PROCEDURE — 86902 BLOOD TYPE ANTIGEN DONOR EA: CPT

## 2025-05-10 PROCEDURE — 85025 COMPLETE CBC W/AUTO DIFF WBC: CPT

## 2025-05-10 PROCEDURE — 85045 AUTOMATED RETICULOCYTE COUNT: CPT

## 2025-05-10 PROCEDURE — 2500000005 HC RX 250 GENERAL PHARMACY W/O HCPCS: Mod: JZ | Performed by: STUDENT IN AN ORGANIZED HEALTH CARE EDUCATION/TRAINING PROGRAM

## 2025-05-10 PROCEDURE — 83615 LACTATE (LD) (LDH) ENZYME: CPT

## 2025-05-10 RX ORDER — ONDANSETRON HYDROCHLORIDE 2 MG/ML
INJECTION, SOLUTION INTRAVENOUS AS NEEDED
Status: DISCONTINUED | OUTPATIENT
Start: 2025-05-10 | End: 2025-05-10

## 2025-05-10 RX ORDER — PROPOFOL 10 MG/ML
INJECTION, EMULSION INTRAVENOUS AS NEEDED
Status: DISCONTINUED | OUTPATIENT
Start: 2025-05-10 | End: 2025-05-10

## 2025-05-10 RX ORDER — NAPROXEN SODIUM 220 MG/1
81 TABLET, FILM COATED ORAL 2 TIMES DAILY
Status: DISCONTINUED | OUTPATIENT
Start: 2025-05-10 | End: 2025-05-16 | Stop reason: HOSPADM

## 2025-05-10 RX ORDER — OXYCODONE HYDROCHLORIDE 5 MG/1
5 TABLET ORAL EVERY 4 HOURS PRN
Status: DISCONTINUED | OUTPATIENT
Start: 2025-05-10 | End: 2025-05-10 | Stop reason: HOSPADM

## 2025-05-10 RX ORDER — ACETAMINOPHEN 10 MG/ML
1000 INJECTION, SOLUTION INTRAVENOUS ONCE
Status: COMPLETED | OUTPATIENT
Start: 2025-05-10 | End: 2025-05-10

## 2025-05-10 RX ORDER — ONDANSETRON HYDROCHLORIDE 2 MG/ML
4 INJECTION, SOLUTION INTRAVENOUS ONCE AS NEEDED
Status: DISCONTINUED | OUTPATIENT
Start: 2025-05-10 | End: 2025-05-10 | Stop reason: HOSPADM

## 2025-05-10 RX ORDER — DIPHENHYDRAMINE HYDROCHLORIDE 50 MG/ML
12.5 INJECTION, SOLUTION INTRAMUSCULAR; INTRAVENOUS ONCE AS NEEDED
Status: DISCONTINUED | OUTPATIENT
Start: 2025-05-10 | End: 2025-05-10 | Stop reason: HOSPADM

## 2025-05-10 RX ORDER — LIDOCAINE HYDROCHLORIDE 10 MG/ML
0.1 INJECTION, SOLUTION INFILTRATION; PERINEURAL ONCE
Status: DISCONTINUED | OUTPATIENT
Start: 2025-05-10 | End: 2025-05-10 | Stop reason: HOSPADM

## 2025-05-10 RX ORDER — CYCLOBENZAPRINE HCL 10 MG
10 TABLET ORAL 3 TIMES DAILY
Status: DISCONTINUED | OUTPATIENT
Start: 2025-05-10 | End: 2025-05-16 | Stop reason: HOSPADM

## 2025-05-10 RX ORDER — METOCLOPRAMIDE HYDROCHLORIDE 5 MG/ML
10 INJECTION INTRAMUSCULAR; INTRAVENOUS ONCE AS NEEDED
Status: DISCONTINUED | OUTPATIENT
Start: 2025-05-10 | End: 2025-05-10 | Stop reason: HOSPADM

## 2025-05-10 RX ORDER — SODIUM CHLORIDE 0.9 G/100ML
INJECTION, SOLUTION IRRIGATION AS NEEDED
Status: DISCONTINUED | OUTPATIENT
Start: 2025-05-10 | End: 2025-05-10 | Stop reason: HOSPADM

## 2025-05-10 RX ORDER — METHOCARBAMOL 100 MG/ML
1000 INJECTION, SOLUTION INTRAMUSCULAR; INTRAVENOUS ONCE
Status: COMPLETED | OUTPATIENT
Start: 2025-05-10 | End: 2025-05-10

## 2025-05-10 RX ORDER — ALBUTEROL SULFATE 0.83 MG/ML
2.5 SOLUTION RESPIRATORY (INHALATION) ONCE AS NEEDED
Status: DISCONTINUED | OUTPATIENT
Start: 2025-05-10 | End: 2025-05-10 | Stop reason: HOSPADM

## 2025-05-10 RX ORDER — TOBRAMYCIN 1.2 G/30ML
INJECTION, POWDER, LYOPHILIZED, FOR SOLUTION INTRAVENOUS AS NEEDED
Status: DISCONTINUED | OUTPATIENT
Start: 2025-05-10 | End: 2025-05-10 | Stop reason: HOSPADM

## 2025-05-10 RX ORDER — KETOROLAC TROMETHAMINE 30 MG/ML
30 INJECTION, SOLUTION INTRAMUSCULAR; INTRAVENOUS EVERY 6 HOURS SCHEDULED
Status: COMPLETED | OUTPATIENT
Start: 2025-05-10 | End: 2025-05-12

## 2025-05-10 RX ORDER — LABETALOL HYDROCHLORIDE 5 MG/ML
5 INJECTION, SOLUTION INTRAVENOUS ONCE AS NEEDED
Status: DISCONTINUED | OUTPATIENT
Start: 2025-05-10 | End: 2025-05-10 | Stop reason: HOSPADM

## 2025-05-10 RX ORDER — HYDRALAZINE HYDROCHLORIDE 20 MG/ML
5 INJECTION INTRAMUSCULAR; INTRAVENOUS EVERY 30 MIN PRN
Status: DISCONTINUED | OUTPATIENT
Start: 2025-05-10 | End: 2025-05-10 | Stop reason: HOSPADM

## 2025-05-10 RX ORDER — FENTANYL CITRATE 50 UG/ML
INJECTION, SOLUTION INTRAMUSCULAR; INTRAVENOUS AS NEEDED
Status: DISCONTINUED | OUTPATIENT
Start: 2025-05-10 | End: 2025-05-10

## 2025-05-10 RX ORDER — ACETAMINOPHEN 325 MG/1
650 TABLET ORAL EVERY 4 HOURS PRN
Status: DISCONTINUED | OUTPATIENT
Start: 2025-05-10 | End: 2025-05-10 | Stop reason: HOSPADM

## 2025-05-10 RX ORDER — OXYCODONE HYDROCHLORIDE 5 MG/1
10 TABLET ORAL EVERY 4 HOURS PRN
Status: DISCONTINUED | OUTPATIENT
Start: 2025-05-10 | End: 2025-05-10 | Stop reason: HOSPADM

## 2025-05-10 RX ORDER — SODIUM CHLORIDE, SODIUM LACTATE, POTASSIUM CHLORIDE, CALCIUM CHLORIDE 600; 310; 30; 20 MG/100ML; MG/100ML; MG/100ML; MG/100ML
100 INJECTION, SOLUTION INTRAVENOUS CONTINUOUS
Status: DISCONTINUED | OUTPATIENT
Start: 2025-05-10 | End: 2025-05-10 | Stop reason: HOSPADM

## 2025-05-10 RX ORDER — MIDAZOLAM HYDROCHLORIDE 1 MG/ML
INJECTION INTRAMUSCULAR; INTRAVENOUS AS NEEDED
Status: DISCONTINUED | OUTPATIENT
Start: 2025-05-10 | End: 2025-05-10

## 2025-05-10 RX ORDER — HYDROMORPHONE HYDROCHLORIDE 0.2 MG/ML
0.2 INJECTION INTRAMUSCULAR; INTRAVENOUS; SUBCUTANEOUS EVERY 5 MIN PRN
Status: DISCONTINUED | OUTPATIENT
Start: 2025-05-10 | End: 2025-05-10 | Stop reason: HOSPADM

## 2025-05-10 RX ORDER — KETOROLAC TROMETHAMINE 30 MG/ML
30 INJECTION, SOLUTION INTRAMUSCULAR; INTRAVENOUS ONCE
Status: COMPLETED | OUTPATIENT
Start: 2025-05-10 | End: 2025-05-10

## 2025-05-10 RX ORDER — VANCOMYCIN HYDROCHLORIDE 1 G/20ML
INJECTION, POWDER, LYOPHILIZED, FOR SOLUTION INTRAVENOUS AS NEEDED
Status: DISCONTINUED | OUTPATIENT
Start: 2025-05-10 | End: 2025-05-10 | Stop reason: HOSPADM

## 2025-05-10 RX ORDER — ROCURONIUM BROMIDE 10 MG/ML
INJECTION, SOLUTION INTRAVENOUS AS NEEDED
Status: DISCONTINUED | OUTPATIENT
Start: 2025-05-10 | End: 2025-05-10

## 2025-05-10 RX ADMIN — HYDROMORPHONE HYDROCHLORIDE 6 MG: 2 INJECTION, SOLUTION INTRAMUSCULAR; INTRAVENOUS; SUBCUTANEOUS at 23:46

## 2025-05-10 RX ADMIN — KETOROLAC TROMETHAMINE 30 MG: 30 INJECTION, SOLUTION INTRAMUSCULAR; INTRAVENOUS at 18:12

## 2025-05-10 RX ADMIN — HYDROMORPHONE HYDROCHLORIDE 0.5 MG: 1 INJECTION, SOLUTION INTRAMUSCULAR; INTRAVENOUS; SUBCUTANEOUS at 12:40

## 2025-05-10 RX ADMIN — SODIUM CHLORIDE 4000 ML: 900 IRRIGANT IRRIGATION at 11:38

## 2025-05-10 RX ADMIN — PIPERACILLIN SODIUM AND TAZOBACTAM SODIUM 3.38 G: 3; .375 INJECTION, SOLUTION INTRAVENOUS at 18:12

## 2025-05-10 RX ADMIN — PIPERACILLIN SODIUM AND TAZOBACTAM SODIUM 3.38 G: 3; .375 INJECTION, SOLUTION INTRAVENOUS at 06:35

## 2025-05-10 RX ADMIN — MEPERIDINE HYDROCHLORIDE 12.5 MG: 25 INJECTION INTRAMUSCULAR; INTRAVENOUS; SUBCUTANEOUS at 12:55

## 2025-05-10 RX ADMIN — FENTANYL CITRATE 100 MCG: 50 INJECTION, SOLUTION INTRAMUSCULAR; INTRAVENOUS at 10:37

## 2025-05-10 RX ADMIN — KETOROLAC TROMETHAMINE 30 MG: 30 INJECTION, SOLUTION INTRAMUSCULAR; INTRAVENOUS at 23:46

## 2025-05-10 RX ADMIN — Medication 2000 MG: at 16:12

## 2025-05-10 RX ADMIN — HYDROMORPHONE HYDROCHLORIDE 6 MG: 2 INJECTION, SOLUTION INTRAMUSCULAR; INTRAVENOUS; SUBCUTANEOUS at 16:15

## 2025-05-10 RX ADMIN — OXYCODONE 10 MG: 5 TABLET ORAL at 12:57

## 2025-05-10 RX ADMIN — HYDROMORPHONE HYDROCHLORIDE 0.5 MG: 1 INJECTION, SOLUTION INTRAMUSCULAR; INTRAVENOUS; SUBCUTANEOUS at 12:05

## 2025-05-10 RX ADMIN — Medication 2000 MG: at 03:27

## 2025-05-10 RX ADMIN — PROPOFOL 200 MG: 10 INJECTION, EMULSION INTRAVENOUS at 10:37

## 2025-05-10 RX ADMIN — ACETAMINOPHEN 975 MG: 325 TABLET ORAL at 03:53

## 2025-05-10 RX ADMIN — ROCURONIUM BROMIDE 10 MG: 10 INJECTION INTRAVENOUS at 11:24

## 2025-05-10 RX ADMIN — MEPERIDINE HYDROCHLORIDE 12.5 MG: 25 INJECTION INTRAMUSCULAR; INTRAVENOUS; SUBCUTANEOUS at 13:05

## 2025-05-10 RX ADMIN — METHOCARBAMOL 1000 MG: 1000 INJECTION, SOLUTION INTRAMUSCULAR; INTRAVENOUS at 12:38

## 2025-05-10 RX ADMIN — MIDAZOLAM HYDROCHLORIDE 1 MG: 1 INJECTION, SOLUTION INTRAMUSCULAR; INTRAVENOUS at 12:43

## 2025-05-10 RX ADMIN — HYDROMORPHONE HYDROCHLORIDE 6 MG: 2 INJECTION, SOLUTION INTRAMUSCULAR; INTRAVENOUS; SUBCUTANEOUS at 05:58

## 2025-05-10 RX ADMIN — SUGAMMADEX 200 MG: 100 INJECTION, SOLUTION INTRAVENOUS at 12:12

## 2025-05-10 RX ADMIN — CYCLOBENZAPRINE 10 MG: 10 TABLET, FILM COATED ORAL at 16:15

## 2025-05-10 RX ADMIN — TOBRAMYCIN SULFATE 1200 MG: 1200 INJECTION, POWDER, FOR SOLUTION INTRAVENOUS at 11:50

## 2025-05-10 RX ADMIN — ASPIRIN 81 MG: 81 TABLET, CHEWABLE ORAL at 20:15

## 2025-05-10 RX ADMIN — Medication 3 L/MIN: at 13:00

## 2025-05-10 RX ADMIN — PIPERACILLIN SODIUM AND TAZOBACTAM SODIUM 3.38 G: 3; .375 INJECTION, SOLUTION INTRAVENOUS at 23:46

## 2025-05-10 RX ADMIN — HYDROMORPHONE HYDROCHLORIDE 0.5 MG: 1 INJECTION, SOLUTION INTRAMUSCULAR; INTRAVENOUS; SUBCUTANEOUS at 12:12

## 2025-05-10 RX ADMIN — HYDROMORPHONE HYDROCHLORIDE 6 MG: 2 INJECTION, SOLUTION INTRAMUSCULAR; INTRAVENOUS; SUBCUTANEOUS at 01:56

## 2025-05-10 RX ADMIN — KETOROLAC TROMETHAMINE 30 MG: 30 INJECTION, SOLUTION INTRAMUSCULAR; INTRAVENOUS at 13:22

## 2025-05-10 RX ADMIN — HYDROMORPHONE HYDROCHLORIDE 6 MG: 2 INJECTION, SOLUTION INTRAMUSCULAR; INTRAVENOUS; SUBCUTANEOUS at 18:12

## 2025-05-10 RX ADMIN — HYDROMORPHONE HYDROCHLORIDE 6 MG: 2 INJECTION, SOLUTION INTRAMUSCULAR; INTRAVENOUS; SUBCUTANEOUS at 13:56

## 2025-05-10 RX ADMIN — VANCOMYCIN HYDROCHLORIDE 1 G: 1025 INJECTION, POWDER, FOR SOLUTION INTRAVENOUS; ORAL at 11:50

## 2025-05-10 RX ADMIN — HYDROMORPHONE HYDROCHLORIDE 6 MG: 2 INJECTION, SOLUTION INTRAMUSCULAR; INTRAVENOUS; SUBCUTANEOUS at 20:15

## 2025-05-10 RX ADMIN — HYDROMORPHONE HYDROCHLORIDE 1 MG: 1 INJECTION, SOLUTION INTRAMUSCULAR; INTRAVENOUS; SUBCUTANEOUS at 13:17

## 2025-05-10 RX ADMIN — HYDROMORPHONE HYDROCHLORIDE 0.5 MG: 1 INJECTION, SOLUTION INTRAMUSCULAR; INTRAVENOUS; SUBCUTANEOUS at 12:34

## 2025-05-10 RX ADMIN — HYDROMORPHONE HYDROCHLORIDE 6 MG: 2 INJECTION, SOLUTION INTRAMUSCULAR; INTRAVENOUS; SUBCUTANEOUS at 03:53

## 2025-05-10 RX ADMIN — SODIUM CHLORIDE, SODIUM LACTATE, POTASSIUM CHLORIDE, AND CALCIUM CHLORIDE: 600; 310; 30; 20 INJECTION, SOLUTION INTRAVENOUS at 10:26

## 2025-05-10 RX ADMIN — CYCLOBENZAPRINE 10 MG: 10 TABLET, FILM COATED ORAL at 20:15

## 2025-05-10 RX ADMIN — MIDAZOLAM HYDROCHLORIDE 2 MG: 2 INJECTION, SOLUTION INTRAMUSCULAR; INTRAVENOUS at 10:37

## 2025-05-10 RX ADMIN — HYDROMORPHONE HYDROCHLORIDE 6 MG: 2 INJECTION, SOLUTION INTRAMUSCULAR; INTRAVENOUS; SUBCUTANEOUS at 08:36

## 2025-05-10 RX ADMIN — ACETAMINOPHEN 1000 MG: 10 INJECTION INTRAVENOUS at 13:30

## 2025-05-10 RX ADMIN — ONDANSETRON 4 MG: 2 INJECTION INTRAMUSCULAR; INTRAVENOUS at 12:05

## 2025-05-10 RX ADMIN — HYDROMORPHONE HYDROCHLORIDE 2 MG: 2 INJECTION, SOLUTION INTRAMUSCULAR; INTRAVENOUS; SUBCUTANEOUS at 14:23

## 2025-05-10 RX ADMIN — ROCURONIUM BROMIDE 60 MG: 10 INJECTION INTRAVENOUS at 10:37

## 2025-05-10 RX ADMIN — HYDROMORPHONE HYDROCHLORIDE 0.5 MG: 1 INJECTION, SOLUTION INTRAMUSCULAR; INTRAVENOUS; SUBCUTANEOUS at 12:48

## 2025-05-10 SDOH — HEALTH STABILITY: MENTAL HEALTH: CURRENT SMOKER: 0

## 2025-05-10 ASSESSMENT — PAIN SCALES - GENERAL
PAINLEVEL_OUTOF10: 10 - WORST POSSIBLE PAIN
PAINLEVEL_OUTOF10: 9
PAINLEVEL_OUTOF10: 7
PAINLEVEL_OUTOF10: 9
PAINLEVEL_OUTOF10: 9
PAINLEVEL_OUTOF10: 10 - WORST POSSIBLE PAIN
PAINLEVEL_OUTOF10: 10 - WORST POSSIBLE PAIN
PAINLEVEL_OUTOF10: 7
PAINLEVEL_OUTOF10: 10 - WORST POSSIBLE PAIN
PAINLEVEL_OUTOF10: 9
PAINLEVEL_OUTOF10: 8
PAINLEVEL_OUTOF10: 8
PAINLEVEL_OUTOF10: 9
PAINLEVEL_OUTOF10: 10 - WORST POSSIBLE PAIN
PAINLEVEL_OUTOF10: 7
PAINLEVEL_OUTOF10: 10 - WORST POSSIBLE PAIN
PAINLEVEL_OUTOF10: 9
PAINLEVEL_OUTOF10: 10 - WORST POSSIBLE PAIN

## 2025-05-10 ASSESSMENT — PAIN - FUNCTIONAL ASSESSMENT

## 2025-05-10 ASSESSMENT — COGNITIVE AND FUNCTIONAL STATUS - GENERAL
DAILY ACTIVITIY SCORE: 24
MOBILITY SCORE: 24

## 2025-05-10 NOTE — ANESTHESIA PROCEDURE NOTES
Airway  Date/Time: 5/10/2025 10:40 AM  Reason: elective    Airway not difficult    Staffing  Performed: CRNA   Authorized by: INA Mascorro    Performed by: INA Mascorro  Patient location during procedure: OR    Patient Condition  Indications for airway management: anesthesia  Patient position: sniffing  Sedation level: deep     Final Airway Details   Preoxygenated: yes  Final airway type: endotracheal airway  Successful airway: ETT   Successful intubation technique: direct laryngoscopy  Endotracheal tube insertion site: oral  Blade: Angel  Blade size: #4  ETT size (mm): 7.0  Cormack-Lehane Classification: grade I - full view of glottis  Placement verified by: chest auscultation and capnometry   Measured from: lips  ETT to lips (cm): 21  Number of attempts at approach: 1

## 2025-05-10 NOTE — ANESTHESIA PREPROCEDURE EVALUATION
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)      Hematology   (+) Chronic anemia   (+) Hodgkin's paragranuloma (Multi)   (+) 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)   (+) Sickle cell anemia with pain   (+) Sickle cell disease with crisis and other complication   (+) Sickle cell pain crisis (Multi)      Musculoskeletal   (+) Scoliosis      ID   (+) Chlamydial epididymitis   (+) Septic bursitis of elbow, right      Skin   (+) Eczema       Clinical information reviewed:   Tobacco  Allergies  Meds   Med Hx  Surg Hx   Fam Hx  Soc Hx        NPO Detail:  NPO/Void Status  Date of Last Liquid: 05/09/25  Time of Last Liquid: 2230  Date of Last Solid: 05/09/25  Time of Last Solid: 2230  Time of Last Void: 0600         Physical Exam    Airway  Mallampati: II  TM distance: >3 FB  Neck ROM: full  Mouth opening: 3 or more finger widths     Cardiovascular - normal exam   Dental - normal exam     Pulmonary - normal exam   Abdominal            Anesthesia Plan    History of general anesthesia?: yes  History of complications of general anesthesia?: no    ASA 3     general     The patient is not a current smoker.    intravenous induction   Anesthetic plan and risks discussed with patient.  Use of blood products discussed with patient who consented to blood products.

## 2025-05-10 NOTE — ANESTHESIA POSTPROCEDURE EVALUATION
Patient: Joellen Narayan    Procedure Summary       Date: 05/10/25 Room / Location: [facility] OR 01 / [facility] OR    Anesthesia Start: 1027 Anesthesia Stop: 1232    Procedure: DEBRIDEMENT, ELBOW (Right: Elbow) Diagnosis:       Septic bursitis of elbow, right      (Septic bursitis of elbow, right [M71.121])    Surgeons: Pillo Tello MD Responsible Provider: Tricia Brown MD    Anesthesia Type: general ASA Status: 3            Anesthesia Type: general    Vitals Value Taken Time   /67 05/10/25 13:00   Temp  05/10/25 13:04   Pulse 103 05/10/25 13:02   Resp 19 05/10/25 13:02   SpO2 99 % 05/10/25 13:02   Vitals shown include unfiled device data.    Anesthesia Post Evaluation    Patient location during evaluation: PACU  Patient participation: complete - patient participated  Level of consciousness: awake and alert  Pain management: adequate  Airway patency: patent  Cardiovascular status: acceptable  Respiratory status: acceptable  Hydration status: acceptable  Postoperative Nausea and Vomiting: none        There were no known notable events for this encounter.

## 2025-05-10 NOTE — CONSULTS
Inpatient consult to Infectious Diseases  Consult performed by: Jb Altman MD  Consult ordered by: Emilee Stoll PA-C      Primary MD: Ian Cruz MD    Reason For Consult: c/f septic arthritis of R elbow    History Of Present Illness  Joellen Narayan is a 24 y.o. male with nodular lymphocyte predominent HL s/p ritux/pred and HbSS SCD (mild splenic sequestration in 2001, priapism, acute chest syndrome, and nocturnal hypoxia (baseline 2L at night) presenting on 5/3 with sickle cell crisis (noted lower back, inner thigh, and R arm pain) and AHRF with fairly unremarkable CT 5/3 without PE.     He was managed with pain mediations but - while his lower back and thigh pain improved - he developed progressive R arm pain and swelling starting around the day after admission followed by fevers on 5/8 with MRI 5/8: elbow joint effusion with synovial enhancement, possible osteomyelitis in distal humerus, ulna signal abnormalities possibly c/w infarcts, and diffuse muscle edema/cellulitis. Joint aspirate 5/8: 44K WBC (95% PMN), 20K RBC, negative crystals, and culture NGTD. Started on vanc/Zosyn 5/8 at time of joint aspirate (only had 2 days of Zosyn prior otherwise 5/3-5/4). OR today 5/10: serosanguinous fluid (blood) in the elbow joint that was not frankly purulent; 2 cultures obtained with debridement and synovial biopsy; humerus debrided with culture/biopsies obtained.    Seen afterwards today. He reports the above. Noted feeling feverish the night before his ED visit and subacute over a few months night sweats. Denies recent injury, insect bites on UE nor other joints acting this way. Denies cough, SOB though hypoxic on checks while inpatients, abdominal pain, diarrhea, dysuria, other joint involvement.    Social  -Born and raised in [place]  -Works for the [employer]  -Lives with mother       Past Medical History  He has a past medical history of Acute chest syndrome (Multi), Corrosion of unspecified body region,  unspecified degree (2014), Impetigo (2024), Nicotine use disorder, Nodular lymphocyte predominant Hodgkin lymphoma, Non Hodgkin's lymphoma, Personal history of diseases of the blood and blood-forming organs and certain disorders involving the immune mechanism, Personal history of other (healed) physical injury and trauma (2015), Personal history of other diseases of the circulatory system, Personal history of other diseases of the circulatory system, Priapism, Rash and other nonspecific skin eruption (2014), and Sickle cell disease (Multi).    Surgical History  He has a past surgical history that includes IR CVC tunneled (2022); CT guided percutaneous biopsy LYMPH node superficial (2022); and CT guided percutaneous abdominal retroperitoneum biopsy (2023).     Social History     Occupational History    Not on file   Tobacco Use    Smoking status: Every Day     Types: Cigars     Last attempt to quit:      Years since quittin.3     Passive exposure: Past    Smokeless tobacco: Never    Tobacco comments:      1 Black and mild daily for 3 years   Substance and Sexual Activity    Alcohol use: Not Currently     Comment: rarely    Drug use: Yes     Types: Marijuana    Sexual activity: Not Currently     Travel History   Travel since 04/10/25    No documented travel since 04/10/25              Family History  Family History[1]  Allergies  Cefepime, Amoxicillin, and Ceftriaxone     Immunization History   Administered Date(s) Administered    Hep A / Hep B 2009    Hepatitis A vaccine, pediatric/adolescent (HAVRIX, VAQTA) 2009    Meningococcal ACWY-D (Menactra) 4-valent conjugate vaccine 2018    Meningococcal B vaccine (BEXSERO) 2018, 2019    Meningococcal MPSV4 2002    Meningococcal, Unknown Serogroups 2002    Pneumococcal conjugate vaccine, 13-valent (PREVNAR 13) 2013    Pneumococcal polysaccharide vaccine, 23-valent, age 2 years and  older (PNEUMOVAX 23) 09/05/2002     Medications  Home medications:  Prescriptions Prior to Admission[2]  Current medications:  Scheduled medications  Scheduled Medications[3]  Continuous medications  Continuous Medications[4]  PRN medications  PRN Medications[5]    Review of Systems     Objective  Range of Vitals (last 24 hours)  Heart Rate:  []   Temp:  [36.1 °C (97 °F)-38 °C (100.4 °F)]   Resp:  [10-22]   BP: (114-182)/(66-88)   SpO2:  [93 %-100 %]   Daily Weight  05/03/25 : 72.6 kg (160 lb)    Body mass index is 20.54 kg/m².     Physical Exam  GEN: Awake, alert, NAD  HEENT: Icteric sclera, no OP erythema/exudate  CV: RRR, 2/6 systolic murmur, no rubs  RESP: No substantial crackles on anterior auscultation  ABD: Soft, NT, ND  EXT: No LE edema. RUE in post-operative wrap. No evidence of emboli  MSK: No other joint swelling     Relevant Results  Labs  Results from last 72 hours   Lab Units 05/10/25  0436 05/09/25  0743 05/08/25  0647   WBC AUTO x10*3/uL 14.8* 16.9* 18.4*   HEMOGLOBIN g/dL 5.9* 7.2* 7.7*   HEMATOCRIT % 16.0* 19.2* 20.0*   PLATELETS AUTO x10*3/uL 274 232 222   NEUTROS PCT AUTO % 67.1 75.9 83.4   LYMPHS PCT AUTO % 13.9 8.6 4.3   MONOS PCT AUTO % 13.8 11.8 10.1   EOS PCT AUTO % 4.3 2.7 1.2     Results from last 72 hours   Lab Units 05/10/25  0436 05/09/25  0743 05/08/25  0647   SODIUM mmol/L 136 135* 131*   POTASSIUM mmol/L 3.7 4.0 3.9   CHLORIDE mmol/L 100 98 94*   CO2 mmol/L 31 26 31   BUN mg/dL 6 8 7   CREATININE mg/dL 0.63 0.68 0.81   GLUCOSE mg/dL 87 82 90   CALCIUM mg/dL 8.9 9.6 9.6   ANION GAP mmol/L 9* 15 10   EGFR mL/min/1.73m*2 >90 >90 >90     Results from last 72 hours   Lab Units 05/10/25  0436 05/09/25  0743 05/08/25  0647   ALK PHOS U/L 113 151* 144*   BILIRUBIN TOTAL mg/dL 22.3* 26.1* 18.2*   PROTEIN TOTAL g/dL 6.1* 7.0 7.1   ALT U/L 39 48 41   AST U/L 54* 78* 83*   ALBUMIN g/dL 3.7 4.2 4.5     Estimated Creatinine Clearance: 125 mL/min (by C-G formula based on SCr of 0.63  mg/dL).  C-Reactive Protein   Date Value Ref Range Status   05/08/2025 16.01 (H) <1.00 mg/dL Final   05/06/2025 6.15 (H) <1.00 mg/dL Final   05/05/2025 7.85 (H) <1.00 mg/dL Final     Sedimentation Rate   Date Value Ref Range Status   05/08/2025 2 0 - 15 mm/h Final   05/06/2025 <1 0 - 15 mm/h Final   05/05/2025 <1 0 - 15 mm/h Final     HIV 1 and 2 Screen   Date Value Ref Range Status   12/03/2022 NONREACTIVE NONREACTIVE Final     Comment:      HIV Ag/Ab screen is performed using the Siemens [employer]   HIV Ag/Ab Combo assay which detects the presence of HIV    p24 antigen as well as antibodies to HIV-1   (Group M and O) and HIV-2.  .  No laboratory evidence of HIV infection. If acute HIV infection is   suspected, consider testing for HIV RNA by PCR (viral load).       Hepatitis C AB   Date Value Ref Range Status   07/13/2024 Nonreactive Nonreactive Final     Comment:     Results from patients taking biotin supplements or receiving high-dose biotin therapy should be interpreted with caution due to possible interference with this test. Providers may contact their local laboratory for further information.     HCV PCR Quant   Date Value Ref Range Status   11/16/2022 NOT DETECTED IU/mL Final     Comment:     REF VALUE  NOT DETECTED       Microbiology  Susceptibility data from last 90 days.  Collected Specimen Info Organism   05/04/25 Fluid from SPUTUM Normal throat raulito       Imaging:  Reviewed in Epic including images.  MRI 5/8:  IMPRESSION:  1. Elbow joint effusion with synovial enhancement compatible with  known septic arthritis.  2. Abnormal marrow signal, enhancement and periosteal reaction in the  humerus extending from the distal epiphysis to the proximal diaphysis  with the most severe findings of the distal humerus highly concerning  for osteomyelitis.  3. There is also abnormal marrow signal in the ulna diaphysis without  periosteal reaction, favored to represent osseous infarcts in the  setting of sickle cell  disease although superimposed  infection/osteomyelitis can not be excluded.  4. Diffuse muscle edema and enhancement in the muscles of the upper  arm concerning for myositis. Severe subcutaneous soft tissue edema  and fluid compatible with cellulitis. No discrete fluid collection to  suggest presence of abscess.    Assessment/Plan   Joellen Narayan is a 24 y.o. male with nodular lymphocyte predominent HL s/p ritux/pred and HbSS SCD (mild splenic sequestration in 2001, priapism, acute chest syndrome, and nocturnal hypoxia (baseline 2L at night) presenting on 5/3 with sickle cell crisis (noted lower back, inner thigh, and R arm pain) and AHRF with fairly unremarkable CT 5/3 without PE. While his lower back and thigh pain improved with pain medications, he developed progressive R arm pain and swelling starting around the day after admission followed by fevers on 5/8     Concern for RUE elbow septic arthritis with possible humeral osteomyelitis. I don't know if AVN might be on the differential (the description of the humeral findings - extending from distal epiphysis to the proximal diaphysis without T1 enhancement - seems a bit unusual for osteo) though joint WBC was high. Findings not wholly consistent with hemarthrosis, crystal disease. OR 5/10: serosanguinous fluid in the elbow joint that was not frankly purulent; 2 cultures obtained with debridement and synovial biopsy; humerus debrided with culture/biopsies obtained. No trauma history to the area, may have been hematogenous seeding if infection    Plan  -Will follow-up OR cultures, path, and joint aspirate cultures  -Continue IV vancomycin and IV Zosyn for now  -Anticipating 6 weeks therapy barring alternative diagnosis identified, IV versus PO will depend upon culture data    Seen with Dr. Warren. ID Team A will follow. Please contact fellow Dr. Warren or myself with questions via EpicChat.    bJ Altman MD         [1]   Family History  Problem Relation Name Age  of Onset    Sickle cell trait Mother      Diabetes Mother      Sickle cell trait Father      Lung cancer Brother     [2]   Medications Prior to Admission   Medication Sig Dispense Refill Last Dose/Taking    folic acid (Folvite) 1 mg tablet Take 1 tablet (1 mg) by mouth once daily. 90 tablet 0 5/2/2025    baclofen (Lioresal) 5 mg tablet Take 1 tablet (5 mg) by mouth 3 times a day. (Patient not taking: Reported on 5/3/2025) 90 tablet 0 Not Taking    chlorhexidine (Hibiclens) 4 % external liquid Apply topically once daily as needed for wound care. Use for 5 days prior to surgery as body wash, do not use on face or genital region (Patient not taking: Reported on 5/3/2025) 473 mL 0 Not Taking    oxyCODONE (Roxicodone) 20 mg immediate release tablet Take 1 tablet (20 mg) by mouth every 8 hours if needed for severe pain (7 - 10). (Patient not taking: Reported on 5/3/2025) 20 tablet 0 Not Taking    pseudoephedrine (Sudogest) 60 mg tablet Take 1 tablet (60 mg) by mouth every 8 hours if needed for congestion for up to 10 days. (Patient not taking: Reported on 5/3/2025) 30 tablet 0 Not Taking   [3] alteplase, 1 mg, intra-catheter, Once  aspirin, 81 mg, oral, BID  cyclobenzaprine, 10 mg, oral, TID  diphenhydrAMINE, 25 mg, oral, Once  [Held by provider] enoxaparin, 40 mg, subcutaneous, Daily  folic acid, 1 mg, oral, Daily  ketorolac, 30 mg, intravenous, q6h REYNALDO  lidocaine, 5 mL, infiltration, Once  lidocaine, 2 patch, transdermal, Daily  piperacillin-tazobactam, 3.375 g, intravenous, q6h  polyethylene glycol, 17 g, oral, Daily  vancomycin, 2,000 mg, intravenous, q8h    [4]    [5] PRN medications: diphenhydrAMINE, HYDROmorphone, hydrOXYzine HCL, ondansetron, pseudoephedrine, sennosides-docusate sodium, sodium chloride, vancomycin

## 2025-05-10 NOTE — H&P
[facility] Department of Orthopaedic Surgery   Surgical History & Physical <30 Days     History & Physical Reviewed:  H&P reviewed. The patient was examined and there are no changes to the H&P. Patient electing to proceed with surgery. Patient consented and posted. Relevant findings and updates are noted below:  No significant changes.     Home medications were reviewed with significant updates noted below:  No significant changes.    Geovani Medellin MD   Orthopedic Surgery PGY1  [facility] (formerly [facility]3]

## 2025-05-10 NOTE — CARE PLAN
Problem: Pain - Adult  Goal: Verbalizes/displays adequate comfort level or baseline comfort level  Outcome: Progressing     Problem: Safety - Adult  Goal: Free from fall injury  Outcome: Progressing     Problem: Discharge Planning  Goal: Discharge to home or other facility with appropriate resources  Outcome: Progressing     Problem: Chronic Conditions and Co-morbidities  Goal: Patient's chronic conditions and co-morbidity symptoms are monitored and maintained or improved  Outcome: Progressing     Problem: Nutrition  Goal: Nutrient intake appropriate for maintaining nutritional needs  Outcome: Progressing     Problem: Fall/Injury  Goal: Not fall by end of shift  Outcome: Progressing  Goal: Be free from injury by end of the shift  Outcome: Progressing  Goal: Verbalize understanding of personal risk factors for fall in the hospital  Outcome: Progressing  Goal: Verbalize understanding of risk factor reduction measures to prevent injury from fall in the home  Outcome: Progressing   The patient's goals for the shift include      The clinical goals for the shift include pt to remain safe through the night 5/9/25 @1930    Pt pain was not controlled . Pt bottom lip became swollen and sore with a white patch on the inner lip. Team notified and swab was taken

## 2025-05-10 NOTE — PROGRESS NOTES
"Joellen Narayan is a 24 y.o. male on day 7 of admission presenting with Sickle cell pain crisis (Multi).    Subjective   Patient seen at bedside. States that he continues to have 9/10 pain in his right arm. States that he continues to have movement and feeling in his arm but lifting it is difficult and very painful. States that he continues to have intermittent fevers. Denies SOB, CP, heart palpitations, N/V/D/C. Discussed plan for OR today. Patient agreeable.        Objective     Physical Exam  HENT:      Head: Normocephalic.      Nose: Nose normal.      Mouth/Throat:      Mouth: Mucous membranes are moist.   Eyes:      Pupils: Pupils are equal, round, and reactive to light.   Cardiovascular:      Rate and Rhythm: Normal rate.      Pulses: Normal pulses.   Pulmonary:      Effort: Pulmonary effort is normal.   Abdominal:      Palpations: Abdomen is soft.   Musculoskeletal:         General: Swelling present.      Cervical back: Normal range of motion.   Skin:     Comments: Right arm swollen and warm to the touch    Neurological:      General: No focal deficit present.      Mental Status: He is alert.   Psychiatric:         Mood and Affect: Mood normal.         Behavior: Behavior normal.         Last Recorded Vitals  Blood pressure 137/69, pulse 88, temperature 36.8 °C (98.2 °F), temperature source Temporal, resp. rate 18, height 1.88 m (6' 2\"), weight 72.6 kg (160 lb), SpO2 100%.  Intake/Output last 3 Shifts:  I/O last 3 completed shifts:  In: 1600 (22 mL/kg) [IV Piggyback:1600]  Out: 1125 (15.5 mL/kg) [Urine:1125 (0.4 mL/kg/hr)]  Weight: 72.6 kg     Relevant Results                   This patient is intubated   Reason for patient to remain intubated today? they are planned for extubation trial later today/tonight             Assessment & Plan  Sickle cell pain crisis (Multi)    Septic bursitis of elbow, right    Joellen Narayan 24 y.o. 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 Patient initially 98% RA, however became hypoxic in ED requiring 2-4 liters, unclear etiology for hypoxia at this time, possibly related to poor inspiratory effort in setting of severe pain vs ACS (low suspicion at this time, however close monitoring) vs infectious process. Multiple CXR w/o evidence of acute process. 5/3 CT PE negative, similar GGOs from prior scans unchanged. Pt asymptomatic, denies respiratory s/s. S/p Zosyn (5/3-5/4). 5/5 Pt reports chest and back pain improved however R arm becoming progressively more severe requiring increasing opioids, initially pt declining MRI R arm but then agreeable. Pt unable to tolerate MRI 5/7. 5/7 Xray showing large R elbow joint effusion. 5/8 Pt became febrile x3, CRP/WBC/hemolysis labs significantly worsening, Ortho consulted c/f osteo vs septic arthritis, s/p joint aspiration concerning for septic arthritis. MRI radius/humerus 5/9 showing concern for septic arthritis, osteomyelitis, myositis, severe muscle and subcutaneous soft tissue edema, and possible cellulitis. 5/10 patient taken to OR for washout. ID consulted-appreciate recs.      # VERÓNICA pain/edema c/f osteomyelitis vs septic arthritis   - original etiology sickle cell related vs AVN vs osteomyelitis vs thrombus vs septic arthritis    - no imaging obtained on admit, pt states acute pain feels like sickle cell pain 5/6   - Jan 2025 there was ? hx of OM of lower extremities vs chronic SCD changes but no surgical interventions required at time therefore low threshold for MRI during this admission  - 5/3 EKG NSR, no ST elevation appears similar to prior   - RUE US neg for DVT (5/5)   - ESR/  CRP <1/5.16 (5/4) --> <1/7.85 (5/5) --> <1, 6.15 (5/6) --> CRP 16.01 (5/8)  - 5/5 offered MRI R arm pt declining at this time even when offered with premedication  - attempted (5/7) MRI R arm (pt did not tolerate exam)  - 5/7 Xray R arm showing large elbow joint effusion   - 5/8 pt became febrile x3, highest 38.8 s/p tylenol 650mg once   - started Zosyn/Vanc (5/8-  ) and IVF x 48hr  - 5/8 Ortho consulted, s/p aspirate (>44k cells), formal recs pending   - MRI w/anesthesia 5/9 adius/humerus showing concern for septic arthritis, osteomyelitis, myositis, severe muscle and subcutaneous soft tissue edema, and possible cellulitis  - OR for washout 5/10  - ID consulted 5/10-appreciate recs   - s/p increased to 5.5mg IVP dilaudid q2 PRN (5/5), offered dPC pt declining at this time as well   - increased to 6mg IVP dilaudid q2h prn for severe pain (5/6-current)     # AHRF, requiring 2-4L on admission  - On 2-3L at night, however patient reports daytime hypoxia as well in the past, currently on 3L 5/8  - unclear at this time, etiology includes acute chest vs poor inspiratory effort in setting of pain vs pneumonia vs PE vs pulm htn  - 5/3 Cxr w/o evidence of acute process   - 5/3 CT PE w/o evidence of PE, similar GGOs from prior likely atelectasis (similar to Dec 2024)   - strep pneum, legionella negative 5/4   - 5/5 ECHO EF 63%, LV severely dilated   - trop 13 (5/5)   - 5/5 repeat CXR neg for acute process, 5/8 CXR w/o evidence acute process   - c/w continuous pulse oximetry  - Continue supportive O2 with NC  - 5/5 EKG NSR  - Flu A/B, covid neg (5/5)  - respx cx 5/4 negative thus far  - low c/f infectious process at this time, empirix atbx discontinued 5/4 per Dr. Correia      # Hgb SS disease   # hx acute chest syndrome   - OARRS reviewed on admission, no aberrancies noted   - Hgb baseline: 9-10, 8.1 on admission, (5/5) Hg 7.5, in setting of worsening hemolysis and pain, s/p 1u pRBC --> Hgb 7.6 (5/6) --> Hgb 8.6 (5/7)  -  5/10 hgb dropped to 5.9-received 2u PRBC  - T.bili: 9.5 LDH: 779 Retic: 13.1 --> (5/7) tbili 11, , retic 17.7%  - leukocytosis noted (5/7) WBC 12.4, likely reactive as pt afebrile with no other s/sx of infectious process  - Admission, s/p initiating 4mg dilaudid q2h prn, IV toradol 15mg q6h x 3 days, lidocaine patch, flexeril as needed, tylenol   - c/w home folic acid daily  - utox pending   - Exchange labs resulted 5/2-5/5, repeat ordered for 5/9   - 5/4 Hg S 92.8%  - s/p 5.5mg IVP dilaudid q2 PRN severe pain (5/5-5/6)   - start 6mg IV dilaudid q2h PRN for severe pain (5/6-current)  - bowel regimen for opioid induced constipation, zofran for opioid induced nausea, and benadrly for opioid induced pruritis       # hx choledocholithiasis 7/2024  - worsening hemolysis in setting of active infection could be contributing to increased hyperbilirubinemia   - July 2024 s/p ERCP with biliary sphincterotomy where sludge and stones were removed, achieving complete clearance   - 1/31/25 was planned for cholecystectomy with Dr. Dove but no show on day of surgery and again 2/13 and 2/27   - no RUQ pain on exam, however pt with leukocytosis, febrile  - blood cx x2 pending collection 5/8   - LFTs at baseline, bili  - tbili baseline ~7-9, (5/6) 10.6 --> (5/8) 18.2 (5/9) 26.1 (5/10) 22.3  - RUQ US showing cholelithiasis with no cholecystitis as well as hepatomegaly and hepatic steatosis     - LR @ 75 x48 (5/8-5/10)     # Hx Priapism  - no active issues on admit   - hx previously drained by urology   - c/w home sudafed PRN     # Nodular lymphocyte predominant Hodgkins lymphoma (NLPHL)    - Follows with Dr. Stoll  - s/p Rituxan and prednisone q3 weeks (C1 1/18/24, C2 2/8/24, Missed C3 d/t sickle cell pain crisis, C4 4/4/24, C5 5/16/24, C6 6/7/24)   - 3/13 No show to onc appt   - 4/9 PET showed interval development of new FDG focus in mid abdomen  - will need onc appt scheduled prior to discharge      # Hx of nocturnal oxygen  dependence  - baseline 2-3 L overnight  - Pulm outpatient appt scheduled back in February  - Will need pulm outpatient scheduling     # prophy  - Enoxaparin   - SCDs, encouraged ambulation      # dispo  - Code Status: Full Code (confirmed on admission)   - DC home resumed O2 pending infectious workup  - NOK: Melissa, mother, [phone] - updated via phone 5/8   - FUV 7/9 sickle cell clinic, onc & pulm FUV requested      I spent 60 minutes in the professional and overall care of this patient.      RAAD Pathak-CNP  Patient discussed with Dr. Parnell

## 2025-05-10 NOTE — PROGRESS NOTES
"Orthopaedic Surgery Progress Note    S: Evaluated in immediate postoperative period. Pain well controlled considering recent surgery. Denies chest pain, shortness of breath, or fevers.    O:  /69   Pulse (!) 111   Temp 37 °C (98.6 °F)   Resp 18   Ht 1.88 m (6' 2\")   Wt 72.6 kg (160 lb)   SpO2 100%   BMI 20.54 kg/m²     Gen: arousable, NAD, appropriately conversational  Cardiac: RRR to peripheral palpation  Resp: nonlabored on RA  GI: soft, nondistended    MSK:  Right upper extremity:   -surgical dressing clean/dry/intact  -Fires axillary/AIN/PIN/ulnar distributions  -SILT axillary/radial/median/ulnar distributions  -Hand warm, well perfused  -Radial pulse not accessible due to splint, however cap refill brisk  -Compartments soft and compressible      A/P: 24 y.o. male s/p R elbow I&D, reaming of ulna and humeral shafts on 5/10 with Dr. Tello.      Plan:  -Ortho following cultures, recommend ID consult   - Weight bearing: Nonweightbearing RUE until drain is out  - DVT PPx: SCDs, DVT chemoppx per primary  - Diet: OK for regular diet from orthopedic perspective  - Pain: Multimodal pain regimen per primary  - Antibiotics: on vanc/zosyn, continue pending cultures  - Dressing:  Mepilex remove POD7 (5/17/25)  - Drain: None  - PT/OT consult  - Drain in place, please chart output every 8 hours      Stephan Lima MD  PGY-4 Orthopedic Surgery  [facility]  Available by Epic Message     While admitted, this patient will be followed by the Ortho Trauma Team. Please contact below residents with any questions (available via Epic Chat).     First call: Favian Medellin or Loree Pastrana, PGY-1  Second call: Ramez Garcia, PGY-2  Third call: Linn Recinos, PGY-3      "

## 2025-05-10 NOTE — PROGRESS NOTES
"Orthopaedic Surgery Progress Note    Subjective:  NPO for OR today. Continues to have right elbow pain. Hgb drop this morning. MRI completed yesterday.     Objective:  /69   Pulse 88   Temp 36.8 °C (98.2 °F) (Temporal)   Resp 18   Ht 1.88 m (6' 2\")   Wt 72.6 kg (160 lb)   SpO2 100%   BMI 20.54 kg/m²     Gen: arousable, NAD, appropriately conversational  Cardiac: RRR to peripheral palpation  Resp: nonlabored on RA  GI: soft, nondistended    MSK:  RUE:   - skin intact. Tender at site of injury with painful ROM.  -Motor intact in axillary/AIN/PIN/ulnar distributions  -SILT axillary/radial/median/ulnar distributions  -Hand wwp, 2+ radial pulse, cap refill brisk  -Compartments soft and compressible, no pain with passive stretch of digits      Assessment/Plan:   Injury: R elbow SA w possible OM  HPI: 24M (Sickle cell w recurrent pain crises, Hodgkin’s lymphoma s/p chemo) adm for 5d w extremity pain c/f repeat crisis. Worsening R elbow pain x 1d. Febrile since this AM, ow VSS. NVI. R lateral elbow effusion, TTP, pain w short arcs. XR w/o fx. WBC 18.4, ESR 2, CRP 16.01. R elbow aspirated for 7cc serosanguinous fluid w ng crystals, 44.8k WBC, 95% PMN, cx pending. Consistent with right elbow septic arthritis. MRI with evidence of associated osteomyelitis. Plan for OR today. Plan for pre/intra operative blood transfusion.      Plan:   - NPO for upcoming surgery with orthopedics.  - Admitted to Heme-Onc, Clear for OR by primary team   - Consented and posted to OR schedule for R elbow I&D w/ orthopedic surgery on 5/10/25  - ROMAT RUE  - Pre-operative ABx: on vanc/zosyn, continue per ID; will obtain intra op cultures   - No indication for transfusion pre-operatively  - DVT PPx: SCDs, okay for chemoppx per primary       Patient will be followed by the Orthopaedic Trauma Team:  1st Call: Loree Patsrana  2nd Call: Ramez Garcia  3rd Call: Linn Recinos  Available via [place]  Pager: [phone]     "

## 2025-05-10 NOTE — CONSULTS
Reason For Consult  Infected right upper extremitiy in setting of sickle cell crisis and non-Hodgkins lymphoma history    History Of Present Illness  Joellen Narayan is a 24 y.o. male presenting with infection right elbow possible acute osteomyelitis .     Past Medical History  He has a past medical history of Acute chest syndrome (Multi), Corrosion of unspecified body region, unspecified degree (12/31/2014), Impetigo (01/04/2024), Nicotine use disorder, Nodular lymphocyte predominant Hodgkin lymphoma, Non Hodgkin's lymphoma, Personal history of diseases of the blood and blood-forming organs and certain disorders involving the immune mechanism, Personal history of other (healed) physical injury and trauma (01/03/2015), Personal history of other diseases of the circulatory system, Personal history of other diseases of the circulatory system, Priapism, Rash and other nonspecific skin eruption (09/09/2014), and Sickle cell disease (Multi).    Surgical History  He has a past surgical history that includes IR CVC tunneled (6/21/2022); CT guided percutaneous biopsy LYMPH node superficial (11/18/2022); and CT guided percutaneous abdominal retroperitoneum biopsy (11/30/2023).     Social History  He reports that he has been smoking cigars. He has been exposed to tobacco smoke. He has never used smokeless tobacco. He reports that he does not currently use alcohol. He reports current drug use. Drug: Marijuana.    Family History  Family History[1]     Allergies  Cefepime, Amoxicillin, and Ceftriaxone    Review of Systems  febrile     Physical Exam  -Tender to palpation over joint  - Pain with short arcs of motion   - Elbow joint effusion   -Motor intact in axillary/AIN/PIN/ulnar distributions  -SILT axillary/radial/median/ulnar distributions  -Hand warm, well perfused  -Palpable  radial pulse, cap refill brisk  -Compartments so      Relevant Results  PRN Medications[2]      Results for orders placed or performed during the  hospital encounter of 05/03/25 (from the past 24 hours)   CBC and Auto Differential   Result Value Ref Range    WBC 16.9 (H) 4.4 - 11.3 x10*3/uL    nRBC 1.6 (H) 0.0 - 0.0 /100 WBCs    RBC 2.33 (L) 4.50 - 5.90 x10*6/uL    Hemoglobin 7.2 (L) 13.5 - 17.5 g/dL    Hematocrit 19.2 (L) 41.0 - 52.0 %    MCV 82 80 - 100 fL    MCH 30.9 26.0 - 34.0 pg    MCHC 37.5 (H) 32.0 - 36.0 g/dL    RDW 19.7 (H) 11.5 - 14.5 %    Platelets 232 150 - 450 x10*3/uL    Neutrophils % 75.9 40.0 - 80.0 %    Immature Granulocytes %, Automated 0.8 0.0 - 0.9 %    Lymphocytes % 8.6 13.0 - 44.0 %    Monocytes % 11.8 2.0 - 10.0 %    Eosinophils % 2.7 0.0 - 6.0 %    Basophils % 0.2 0.0 - 2.0 %    Neutrophils Absolute 12.85 (H) 1.20 - 7.70 x10*3/uL    Immature Granulocytes Absolute, Automated 0.14 0.00 - 0.70 x10*3/uL    Lymphocytes Absolute 1.46 1.20 - 4.80 x10*3/uL    Monocytes Absolute 2.00 (H) 0.10 - 1.00 x10*3/uL    Eosinophils Absolute 0.45 0.00 - 0.70 x10*3/uL    Basophils Absolute 0.04 0.00 - 0.10 x10*3/uL   Comprehensive metabolic panel   Result Value Ref Range    Glucose 82 74 - 99 mg/dL    Sodium 135 (L) 136 - 145 mmol/L    Potassium 4.0 3.5 - 5.3 mmol/L    Chloride 98 98 - 107 mmol/L    Bicarbonate 26 21 - 32 mmol/L    Anion Gap 15 10 - 20 mmol/L    Urea Nitrogen 8 6 - 23 mg/dL    Creatinine 0.68 0.50 - 1.30 mg/dL    eGFR >90 >60 mL/min/1.73m*2    Calcium 9.6 8.6 - 10.6 mg/dL    Albumin 4.2 3.4 - 5.0 g/dL    Alkaline Phosphatase 151 (H) 33 - 120 U/L    Total Protein 7.0 6.4 - 8.2 g/dL    AST 78 (H) 9 - 39 U/L    Bilirubin, Total 26.1 (H) 0.0 - 1.2 mg/dL    ALT 48 10 - 52 U/L   Lactate dehydrogenase   Result Value Ref Range     (H) 84 - 246 U/L   Reticulocytes   Result Value Ref Range    Retic % 14.5 (H) 0.5 - 2.0 %    Retic Absolute 0.339 (H) 0.022 - 0.118 x10*6/uL    Reticulocyte Hemoglobin 27 (L) 28 - 38 pg    Immature Retic fraction 20.7 (H) <=16.0 %   Type and screen   Result Value Ref Range    ABO TYPE B     Rh TYPE POS      ANTIBODY SCREEN NEG    Calcium, ionized   Result Value Ref Range    POCT Calcium, Ionized 1.24 1.1 - 1.33 mmol/L   Coagulation Screen   Result Value Ref Range    Protime 16.1 (H) 9.8 - 12.4 seconds    INR 1.5 (H) 0.9 - 1.1    aPTT 28 26 - 36 seconds   Procalcitonin   Result Value Ref Range    Procalcitonin 0.41 (H) <=0.07 ng/mL   Vancomycin   Result Value Ref Range    Vancomycin 2.4 (L) 5.0 - 20.0 ug/mL   CBC and Auto Differential   Result Value Ref Range    WBC 14.8 (H) 4.4 - 11.3 x10*3/uL    nRBC 0.9 (H) 0.0 - 0.0 /100 WBCs    RBC 1.95 (L) 4.50 - 5.90 x10*6/uL    Hemoglobin 5.9 (LL) 13.5 - 17.5 g/dL    Hematocrit 16.0 (L) 41.0 - 52.0 %    MCV 82 80 - 100 fL    MCH 30.3 26.0 - 34.0 pg    MCHC 36.9 (H) 32.0 - 36.0 g/dL    RDW 19.7 (H) 11.5 - 14.5 %    Platelets 274 150 - 450 x10*3/uL    Neutrophils % 67.1 40.0 - 80.0 %    Immature Granulocytes %, Automated 0.7 0.0 - 0.9 %    Lymphocytes % 13.9 13.0 - 44.0 %    Monocytes % 13.8 2.0 - 10.0 %    Eosinophils % 4.3 0.0 - 6.0 %    Basophils % 0.2 0.0 - 2.0 %    Neutrophils Absolute 9.93 (H) 1.20 - 7.70 x10*3/uL    Immature Granulocytes Absolute, Automated 0.10 0.00 - 0.70 x10*3/uL    Lymphocytes Absolute 2.06 1.20 - 4.80 x10*3/uL    Monocytes Absolute 2.05 (H) 0.10 - 1.00 x10*3/uL    Eosinophils Absolute 0.64 0.00 - 0.70 x10*3/uL    Basophils Absolute 0.03 0.00 - 0.10 x10*3/uL   Comprehensive metabolic panel   Result Value Ref Range    Glucose 87 74 - 99 mg/dL    Sodium 136 136 - 145 mmol/L    Potassium 3.7 3.5 - 5.3 mmol/L    Chloride 100 98 - 107 mmol/L    Bicarbonate 31 21 - 32 mmol/L    Anion Gap 9 (L) 10 - 20 mmol/L    Urea Nitrogen 6 6 - 23 mg/dL    Creatinine 0.63 0.50 - 1.30 mg/dL    eGFR >90 >60 mL/min/1.73m*2    Calcium 8.9 8.6 - 10.6 mg/dL    Albumin 3.7 3.4 - 5.0 g/dL    Alkaline Phosphatase 113 33 - 120 U/L    Total Protein 6.1 (L) 6.4 - 8.2 g/dL    AST 54 (H) 9 - 39 U/L    Bilirubin, Total 22.3 (H) 0.0 - 1.2 mg/dL    ALT 39 10 - 52 U/L   Lactate  dehydrogenase   Result Value Ref Range     (H) 84 - 246 U/L   Reticulocytes   Result Value Ref Range    Retic % 17.2 (H) 0.5 - 2.0 %    Retic Absolute 0.334 (H) 0.022 - 0.118 x10*6/uL    Reticulocyte Hemoglobin 27 (L) 28 - 38 pg    Immature Retic fraction 20.9 (H) <=16.0 %   Vancomycin   Result Value Ref Range    Vancomycin 37.0 (H) 5.0 - 20.0 ug/mL     *Note: Due to a large number of results and/or encounters for the requested time period, some results have not been displayed. A complete set of results can be found in Results Review.   MRI results.  Suggest infected right elbow but also has intramedullary changes in the right humerus consistent with acute osteomyelitis.  Ulna has similar changes but may represent infarcts secondary to sickle cell crisis.  Difficult to interpret ulna findings     Assessment/Plan most likely septic right elbow with septic acute osteomyelitis of humerus possible ulna in setting of sickle cell crisis and history of non-Hodgkin's lymphoma    I am now the orthopedic attending for this case and will most likely take patient to surgery within the next several hours and perform a debridement of the right elbow, intramedullary reaming of the right humerus and possible right ulna.  Will obtain deep cultures and pathology.    Pillo Tello MD         [1]   Family History  Problem Relation Name Age of Onset    Sickle cell trait Mother      Diabetes Mother      Sickle cell trait Father      Lung cancer Brother     [2] PRN medications: albuterol, cyclobenzaprine, diphenhydrAMINE, hydrALAZINE, HYDROmorphone, HYDROmorphone, HYDROmorphone, HYDROmorphone, hydrOXYzine HCL, insulin regular, labetaloL, meperidine, midazolam, ondansetron, oxyCODONE, oxyCODONE, oxyCODONE, pseudoephedrine, racepinephrine, sennosides-docusate sodium, sodium chloride, vancomycin

## 2025-05-10 NOTE — PROGRESS NOTES
Plan for patient to go to OR for washout d/t septic arthritis. Discussed with Attending Dr. Parnell, and reviewed labs/vitals/imaging. Patient is stable to proceed to OR today. 2U PRBC ordered stat to receive either prior to, or during surgery.

## 2025-05-10 NOTE — CARE PLAN
The patient's goals for the shift include      The clinical goals for the shift include pt will remain free of injury    Problem: Pain - Adult  Goal: Verbalizes/displays adequate comfort level or baseline comfort level  Outcome: Progressing     Problem: Safety - Adult  Goal: Free from fall injury  Outcome: Progressing     Problem: Discharge Planning  Goal: Discharge to home or other facility with appropriate resources  Outcome: Progressing     Problem: Chronic Conditions and Co-morbidities  Goal: Patient's chronic conditions and co-morbidity symptoms are monitored and maintained or improved  Outcome: Progressing     Problem: Nutrition  Goal: Nutrient intake appropriate for maintaining nutritional needs  Outcome: Progressing     Problem: Fall/Injury  Goal: Not fall by end of shift  Outcome: Progressing  Goal: Be free from injury by end of the shift  Outcome: Progressing  Goal: Verbalize understanding of personal risk factors for fall in the hospital  Outcome: Progressing  Goal: Verbalize understanding of risk factor reduction measures to prevent injury from fall in the home  Outcome: Progressing

## 2025-05-10 NOTE — OP NOTE
DEBRIDEMENT, ELBOW (R) Operative Note     Date: 5/3/2025 - 5/10/2025  OR Location: [facility] OR    Name: Joellen Narayan YOB: 2000, Age: 24 y.o., MRN: [id number], Sex: male    Diagnosis  Pre-op Diagnosis      * Septic bursitis of elbow, right [M71.121] Post-op Diagnosis     * Septic bursitis of elbow, right [M71.121], possible osteomyelitis humerus and ulna     Procedures  DEBRIDEMENT, ELBOW  [id number] - [place] ARTHROSCOPY ELBOW SURGICAL DEBRIDEMENT EXTENSIVE      Surgeons      * Pillo Tello - Primary    Resident/Fellow/Other Assistant:  Surgeons and Role:     * Stephan Lima MD - Resident - Assisting     * See Garcia MD - Fellow    Staff:   Circulator: Latonia Ely Person: Petra    Anesthesia Staff: Anesthesiologist: Tricia Brown MD  CRNA: RAAD Mascorro-CRNA    Procedure Summary  Anesthesia: General  ASA: III  Estimated Blood Loss: 100mL  Intra-op Medications:   Administrations occurring from 1005 to 1145 on 05/10/25:   Medication Name Total Dose   sodium chloride 0.9 % irrigation solution 4,000 mL   fentaNYL (Sublimaze) injection 50 mcg/mL 100 mcg   LR bolus Cannot be calculated   midazolam PF (Versed) injection 1 mg/mL 2 mg   propofol (Diprivan) injection 10 mg/mL 200 mg   rocuronium (ZeMuron) 50 mg/5 mL injection 60 mg   vancomycin (Vancocin) 2,000 mg in sodium chloride 0.9%  mL Cannot be calculated              Anesthesia Record               Intraprocedure I/O Totals          Intake    Transfuse RBC :2 Hours, Irradiated, Leukocytes Reduced (CMV reduced risk), Hgb S Negative 350.00 mL    Total Intake 350 mL          Specimen:   ID Type Source Tests Collected by Time   1 : RIGHT ELBOW SYNOVIUM Tissue SYNOVIUM SURGICAL PATHOLOGY EXAM See Garcia MD 5/10/2025 1113   2 : RIGHT HUMERAL BIOPSY Tissue BONE BIOPSY (NO DECAL) SURGICAL PATHOLOGY EXAM See Garcia MD 5/10/2025 1133   A : RIGHT ELBOW #1 Swab ABSCESS FUNGAL CULTURE/SMEAR, TISSUE/WOUND CULTURE/SMEAR See KHALIL  MD Jose 5/10/2025 1112   B : RIGHT ELBOW #2 Swab ABSCESS FUNGAL CULTURE/SMEAR, TISSUE/WOUND CULTURE/SMEAR See Garcia MD 5/10/2025 1112   C : RIGHT HUMERUS #1 Swab ABSCESS FUNGAL CULTURE/SMEAR, TISSUE/WOUND CULTURE/SMEAR See Garcia MD 5/10/2025 1132   D : RIGHT HUMERUS #2 Swab ABSCESS FUNGAL CULTURE/SMEAR, TISSUE/WOUND CULTURE/SMEAR See Garcia MD 5/10/2025 1132   E : RIGHT HUMERUS #3 Swab ABSCESS FUNGAL CULTURE/SMEAR, TISSUE/WOUND CULTURE/SMEAR See Garcia MD 5/10/2025 1133                 Drains and/or Catheters: Medium Hemovac drain    Tourniquet Times:     Total Tourniquet Time Documented:  Arm - Upper (Right) - 22 minutes  Total: Arm - Upper (Right) - 22 minutes      Implants:     Findings: Serosanguineous fluid elbow, no obvious pustulant drainage from humerus or ulna    Indications: Joellen Narayan is an 24 y.o. male who is having surgery for Septic bursitis of elbow, right [M71.121].     The patient was seen in the preoperative area. The risks, benefits, complications, treatment options, non-operative alternatives, expected recovery and outcomes were discussed with the patient. The possibilities of reaction to medication, pulmonary aspiration, injury to surrounding structures, bleeding, recurrent infection, the need for additional procedures, failure to diagnose a condition, and creating a complication requiring transfusion or operation were discussed with the patient. The patient concurred with the proposed plan, giving informed consent.  The site of surgery was properly noted/marked if necessary per policy. The patient has been actively warmed in preoperative area. Preoperative antibiotics have been ordered and given within 1 hours of incision. Venous thrombosis prophylaxis have been ordered including bilateral sequential compression devices    Procedure Details operative procedure    Preoperative diagnosis right elbow septic arthritis, right hematologic acute  osteomyelitis humerus, possible osteomyelitis ulna right, sickle cell crisis, history of non-Hodgkin's lymphoma.    Postop diagnosis same    Procedure open arthrotomy and excisional debridement of synovium with culture and biopsy right elbow, open corticotomy right humerus with intramedullary reaming of canal  culture and biopsy.  Open excisional debridement of intramedullary canal ulna with proximal ulnar corticotomy.    Surgeon Omer Garcia, Dr. Garcia was required as my first assistant as a fellow in orthopedic trauma.  Janie Garner  Anesthesia General Estimated blood loss 100 ml tourniquet time 22 minutes    Operative indications this is a 24-year-old young man with a history of sickle cell anemia who approximately 1 week ago started develop right elbow pain but other signs of sickle cell crisis.  He then developed fevers and chills over the last several days and was placed on antibiotics in the hospital.  He has shown anemia for which she has been treated with transfusions.  To make it more complicated he has a history of non-Hodgkin's lymphoma and is status postchemotherapy and somewhat immune compromised.  We were consulted because of right elbow swelling.  2 days ago he had an aspiration of the right elbow with a cell count which suggested infection or least significant inflammation but his cultures have been negative.  He was on preaspiration antibiotics.  My orthopedic partners did order a MRI scan of the upper extremity which suggested infection of the right elbow but also osteomyelitis of the right humerus extending up the shaft.  He had some cortical changes as well distally but no obvious abscesses in the arm.  The ulna was read as possible ischemic infarcts from his sickle cell albeit though this was somewhat confusing and we really could not distinguish this from osteomyelitis.  I I took over his care because he was unable to be done earlier for medical reasons.  I talked to  him in length and believe that the above procedure was the right thing to do.  I think indeed this appeared to be infection and that eliminating the infection from the elbow would no doubt help him recover with good cartilage.  If indeed the humerus was osteomyelitis which the MRI scan suggested debridement through the distal corticotomy and reaming of the canal would be a reasonable option.  The risks and benefits were discussed and he did agree to proceed.  I did speak to his mother's phone.    Operative procedure patient was wheeled into the operating room fully alert and awake.  A full huddle was performed.  Patient had adequate general anesthesia.  We did not give the patient any new antibiotics but his antibiotics were not due yet from the floor he was on vancomycin and Zosyn.  Patient was placed in the lateral position on a reverse Stilley table with a beanbag.  The entire right upper extremity was then prepped and draped in usual sterile fashion over a right ankle post keeping the elbow at 90 degrees.  A sterile tourniquet was placed in the proximal right arm.  We did not use the Esmarch.  Surgical pause was performed.  We elevated the arm for 2 minutes and then elevated the tourniquet to appropriate pressure.    We then made an incision around the right elbow.  We actually went down the midline a little bit to the radial side about the 2 cm distal to the tip of the olecranon and about 5 cm proximal to the olecranon went down through skin and subcutaneous tissue and we split the triceps at the center without taking any detachment.  We went medially into olecranon fossa and the elbow joint.  There was serosanguineous like fluid it appeared bloody but it also appeared to be somewhat lighter in weight or appearing.  I would not call it purulent.  We took 2 cultures from this but we also did extensive debridement of the synovium both in the medial lateral gutter and in the olecranon fossa.  We sent the synovium  for biopsy.  After excisionally debriding the elbow we then moved to the humerus.    At about 1 cm above the left renal fossa on the posterior portion of the humerus we used a 4 mm vikas bur to create a 7 mm wide and approximately 10 mm long unicortical corticotomy through the posterior cortex at the metaphyseal diaphyseal junction.  We were able to get a wire in the intramedullary canal all the way up to the proximal metaphysis.  We then started reaming using a 6.5 reamer we went to a 7 and then a 7.5.  We did take cultures x 2 from the reamings as well as these we took some of the reamings and sent that the biopsy as well for permanent section.  We looked at this under fluoroscopic image during the entire reaming process.  We did ream with the tourniquet down and put the tourniquet down at this point.    We then moved onto the ulna where underneath the tricep insertion on the proximal ulna we placed a 3.2 guidewire into the ulna proximally and into the ulnar shaft which was checked on the AP and lateral fluoroscopic image.  We then used a 5.0 cannulated drill bit to create a hole over the guidewire into the proximal ulna and along the ulnar shaft at least senior care down the ulnar shaft.  The ulnar started to get a little tight.  Are weak and we do not want a break the drill bit sewn we debrided excisionally debrided the ulna distally using a guidewire ball tip and bring out some of the excisional debridements from this.  The only did not appear to have any abnormal tissue we did not biopsy the ulna.  We copiously irrigated out the elbow joint at this point in the upper and lower corticotomy's on the humerus and ulna respectively.  We placed 1 g of vancomycin powder and 1 g of tobramycin powder into the elbow joint.  We used a medium Hemovac drain into the elbow joint which would also drain the humerus and ulna corticotomy's.  We then closed the tricep fascia with interrupted #1 PDS suture we closed the skin with  2-0 PDS in the dermis and 2-0 nylon in the skin.  Sterile dressings were applied patient was put in a soft wrap dressing patient will be allowed elbow range of motion.  Patient would no doubt need to receive at least several weeks of IV antibiotics hopefully we can get them appropriately focused on the sensitivities of our cultures.  Patient tolerated this procedure well  Evidence of Infection: Yes; Purulent fluid Below the level of the fascia (organ/space)  Complications:  None; patient tolerated the procedure well.    Disposition: PACU - hemodynamically stable.  Condition: stable                 Additional Details:     Attending Attestation: I was present and scrubbed for the entire procedure.    Pillo Tello  Phone Number: [phone]

## 2025-05-11 VITALS
TEMPERATURE: 97.5 F | OXYGEN SATURATION: 95 % | HEART RATE: 74 BPM | SYSTOLIC BLOOD PRESSURE: 134 MMHG | BODY MASS INDEX: 20.53 KG/M2 | DIASTOLIC BLOOD PRESSURE: 62 MMHG | RESPIRATION RATE: 16 BRPM | WEIGHT: 160 LBS | HEIGHT: 74 IN

## 2025-05-11 LAB
ALBUMIN SERPL BCP-MCNC: 3.3 G/DL (ref 3.4–5)
ALP SERPL-CCNC: 106 U/L (ref 33–120)
ALT SERPL W P-5'-P-CCNC: 33 U/L (ref 10–52)
ANION GAP SERPL CALC-SCNC: 12 MMOL/L (ref 10–20)
AST SERPL W P-5'-P-CCNC: 50 U/L (ref 9–39)
BACTERIA BLD CULT: NORMAL
BACTERIA BLD CULT: NORMAL
BACTERIA FLD CULT: NORMAL
BACTERIA SPEC CULT: NORMAL
BASOPHILS # BLD AUTO: 0.02 X10*3/UL (ref 0–0.1)
BASOPHILS NFR BLD AUTO: 0.2 %
BILIRUB SERPL-MCNC: 18.3 MG/DL (ref 0–1.2)
BLOOD EXPIRATION DATE: NORMAL
BLOOD EXPIRATION DATE: NORMAL
BUN SERPL-MCNC: 6 MG/DL (ref 6–23)
CALCIUM SERPL-MCNC: 8.6 MG/DL (ref 8.6–10.6)
CHLORIDE SERPL-SCNC: 102 MMOL/L (ref 98–107)
CO2 SERPL-SCNC: 27 MMOL/L (ref 21–32)
CREAT SERPL-MCNC: 0.78 MG/DL (ref 0.5–1.3)
DISPENSE STATUS: NORMAL
DISPENSE STATUS: NORMAL
EGFRCR SERPLBLD CKD-EPI 2021: >90 ML/MIN/1.73M*2
EOSINOPHIL # BLD AUTO: 0.7 X10*3/UL (ref 0–0.7)
EOSINOPHIL NFR BLD AUTO: 6.1 %
ERYTHROCYTE [DISTWIDTH] IN BLOOD BY AUTOMATED COUNT: 19.5 % (ref 11.5–14.5)
FUNGUS SPEC FUNGUS STN: NORMAL
GLUCOSE SERPL-MCNC: 75 MG/DL (ref 74–99)
GRAM STN SPEC: NORMAL
HCT VFR BLD AUTO: 19.6 % (ref 41–52)
HGB BLD-MCNC: 6.8 G/DL (ref 13.5–17.5)
HGB RETIC QN: 28 PG (ref 28–38)
IMM GRANULOCYTES # BLD AUTO: 0.09 X10*3/UL (ref 0–0.7)
IMM GRANULOCYTES NFR BLD AUTO: 0.8 % (ref 0–0.9)
IMMATURE RETIC FRACTION: 22.4 %
LDH SERPL L TO P-CCNC: 495 U/L (ref 84–246)
LYMPHOCYTES # BLD AUTO: 2.06 X10*3/UL (ref 1.2–4.8)
LYMPHOCYTES NFR BLD AUTO: 18.1 %
MAGNESIUM SERPL-MCNC: 2.19 MG/DL (ref 1.6–2.4)
MCH RBC QN AUTO: 29.3 PG (ref 26–34)
MCHC RBC AUTO-ENTMCNC: 34.7 G/DL (ref 32–36)
MCV RBC AUTO: 85 FL (ref 80–100)
MONOCYTES # BLD AUTO: 1.44 X10*3/UL (ref 0.1–1)
MONOCYTES NFR BLD AUTO: 12.6 %
NEUTROPHILS # BLD AUTO: 7.1 X10*3/UL (ref 1.2–7.7)
NEUTROPHILS NFR BLD AUTO: 62.2 %
NRBC BLD-RTO: 0.8 /100 WBCS (ref 0–0)
PLATELET # BLD AUTO: 345 X10*3/UL (ref 150–450)
POTASSIUM SERPL-SCNC: 4.2 MMOL/L (ref 3.5–5.3)
PRODUCT BLOOD TYPE: 5100
PRODUCT BLOOD TYPE: 5100
PRODUCT CODE: NORMAL
PRODUCT CODE: NORMAL
PROT SERPL-MCNC: 5.3 G/DL (ref 6.4–8.2)
RBC # BLD AUTO: 2.32 X10*6/UL (ref 4.5–5.9)
RETICS #: 0.48 X10*6/UL (ref 0.02–0.12)
RETICS/RBC NFR AUTO: 20.8 % (ref 0.5–2)
SODIUM SERPL-SCNC: 137 MMOL/L (ref 136–145)
UNIT ABO: NORMAL
UNIT ABO: NORMAL
UNIT NUMBER: NORMAL
UNIT NUMBER: NORMAL
UNIT RH: NORMAL
UNIT RH: NORMAL
UNIT VOLUME: 350
UNIT VOLUME: 350
WBC # BLD AUTO: 11.4 X10*3/UL (ref 4.4–11.3)
XM INTEP: NORMAL
XM INTEP: NORMAL

## 2025-05-11 PROCEDURE — 2500000004 HC RX 250 GENERAL PHARMACY W/ HCPCS (ALT 636 FOR OP/ED): Mod: JZ,TB

## 2025-05-11 PROCEDURE — 83735 ASSAY OF MAGNESIUM: CPT

## 2025-05-11 PROCEDURE — 1170000001 HC PRIVATE ONCOLOGY ROOM DAILY

## 2025-05-11 PROCEDURE — 2500000001 HC RX 250 WO HCPCS SELF ADMINISTERED DRUGS (ALT 637 FOR MEDICARE OP)

## 2025-05-11 PROCEDURE — 2500000004 HC RX 250 GENERAL PHARMACY W/ HCPCS (ALT 636 FOR OP/ED): Mod: JZ

## 2025-05-11 PROCEDURE — 85045 AUTOMATED RETICULOCYTE COUNT: CPT

## 2025-05-11 PROCEDURE — P9040 RBC LEUKOREDUCED IRRADIATED: HCPCS

## 2025-05-11 PROCEDURE — 36430 TRANSFUSION BLD/BLD COMPNT: CPT

## 2025-05-11 PROCEDURE — 85025 COMPLETE CBC W/AUTO DIFF WBC: CPT

## 2025-05-11 PROCEDURE — 99233 SBSQ HOSP IP/OBS HIGH 50: CPT | Performed by: INTERNAL MEDICINE

## 2025-05-11 PROCEDURE — 83615 LACTATE (LD) (LDH) ENZYME: CPT

## 2025-05-11 PROCEDURE — 80053 COMPREHEN METABOLIC PANEL: CPT

## 2025-05-11 RX ADMIN — HYDROMORPHONE HYDROCHLORIDE 6 MG: 2 INJECTION, SOLUTION INTRAMUSCULAR; INTRAVENOUS; SUBCUTANEOUS at 12:28

## 2025-05-11 RX ADMIN — HYDROMORPHONE HYDROCHLORIDE 6 MG: 2 INJECTION, SOLUTION INTRAMUSCULAR; INTRAVENOUS; SUBCUTANEOUS at 02:43

## 2025-05-11 RX ADMIN — HYDROMORPHONE HYDROCHLORIDE 6 MG: 2 INJECTION, SOLUTION INTRAMUSCULAR; INTRAVENOUS; SUBCUTANEOUS at 18:36

## 2025-05-11 RX ADMIN — CYCLOBENZAPRINE 10 MG: 10 TABLET, FILM COATED ORAL at 14:00

## 2025-05-11 RX ADMIN — Medication 2000 MG: at 00:41

## 2025-05-11 RX ADMIN — HYDROMORPHONE HYDROCHLORIDE 6 MG: 2 INJECTION, SOLUTION INTRAMUSCULAR; INTRAVENOUS; SUBCUTANEOUS at 16:34

## 2025-05-11 RX ADMIN — HYDROMORPHONE HYDROCHLORIDE 6 MG: 2 INJECTION, SOLUTION INTRAMUSCULAR; INTRAVENOUS; SUBCUTANEOUS at 08:20

## 2025-05-11 RX ADMIN — Medication 2000 MG: at 08:20

## 2025-05-11 RX ADMIN — DIPHENHYDRAMINE HCL 25 MG: 25 CAPSULE ORAL at 12:46

## 2025-05-11 RX ADMIN — KETOROLAC TROMETHAMINE 30 MG: 30 INJECTION, SOLUTION INTRAMUSCULAR; INTRAVENOUS at 05:34

## 2025-05-11 RX ADMIN — PIPERACILLIN SODIUM AND TAZOBACTAM SODIUM 3.38 G: 3; .375 INJECTION, SOLUTION INTRAVENOUS at 12:28

## 2025-05-11 RX ADMIN — FOLIC ACID 1 MG: 1 TABLET ORAL at 08:20

## 2025-05-11 RX ADMIN — PIPERACILLIN SODIUM AND TAZOBACTAM SODIUM 3.38 G: 3; .375 INJECTION, SOLUTION INTRAVENOUS at 05:34

## 2025-05-11 RX ADMIN — CYCLOBENZAPRINE 10 MG: 10 TABLET, FILM COATED ORAL at 21:32

## 2025-05-11 RX ADMIN — Medication 2000 MG: at 16:34

## 2025-05-11 RX ADMIN — KETOROLAC TROMETHAMINE 30 MG: 30 INJECTION, SOLUTION INTRAMUSCULAR; INTRAVENOUS at 18:36

## 2025-05-11 RX ADMIN — ASPIRIN 81 MG: 81 TABLET, CHEWABLE ORAL at 08:20

## 2025-05-11 RX ADMIN — KETOROLAC TROMETHAMINE 30 MG: 30 INJECTION, SOLUTION INTRAMUSCULAR; INTRAVENOUS at 12:28

## 2025-05-11 RX ADMIN — HYDROMORPHONE HYDROCHLORIDE 6 MG: 2 INJECTION, SOLUTION INTRAMUSCULAR; INTRAVENOUS; SUBCUTANEOUS at 10:02

## 2025-05-11 RX ADMIN — HYDROMORPHONE HYDROCHLORIDE 6 MG: 2 INJECTION, SOLUTION INTRAMUSCULAR; INTRAVENOUS; SUBCUTANEOUS at 05:34

## 2025-05-11 RX ADMIN — PIPERACILLIN SODIUM AND TAZOBACTAM SODIUM 3.38 G: 3; .375 INJECTION, SOLUTION INTRAVENOUS at 18:36

## 2025-05-11 RX ADMIN — CYCLOBENZAPRINE 10 MG: 10 TABLET, FILM COATED ORAL at 08:20

## 2025-05-11 RX ADMIN — ASPIRIN 81 MG: 81 TABLET, CHEWABLE ORAL at 21:32

## 2025-05-11 RX ADMIN — HYDROMORPHONE HYDROCHLORIDE 6 MG: 2 INJECTION, SOLUTION INTRAMUSCULAR; INTRAVENOUS; SUBCUTANEOUS at 21:33

## 2025-05-11 RX ADMIN — HYDROMORPHONE HYDROCHLORIDE 6 MG: 2 INJECTION, SOLUTION INTRAMUSCULAR; INTRAVENOUS; SUBCUTANEOUS at 14:00

## 2025-05-11 ASSESSMENT — COGNITIVE AND FUNCTIONAL STATUS - GENERAL
DAILY ACTIVITIY SCORE: 22
DAILY ACTIVITIY SCORE: 24
DRESSING REGULAR UPPER BODY CLOTHING: A LOT
MOBILITY SCORE: 24
MOBILITY SCORE: 24

## 2025-05-11 ASSESSMENT — PAIN - FUNCTIONAL ASSESSMENT
PAIN_FUNCTIONAL_ASSESSMENT: 0-10

## 2025-05-11 ASSESSMENT — PAIN SCALES - GENERAL
PAINLEVEL_OUTOF10: 9
PAINLEVEL_OUTOF10: 10 - WORST POSSIBLE PAIN
PAINLEVEL_OUTOF10: 10 - WORST POSSIBLE PAIN
PAINLEVEL_OUTOF10: 9
PAINLEVEL_OUTOF10: 10 - WORST POSSIBLE PAIN
PAINLEVEL_OUTOF10: 9
PAINLEVEL_OUTOF10: 10 - WORST POSSIBLE PAIN

## 2025-05-11 NOTE — CARE PLAN
Problem: Pain - Adult  Goal: Verbalizes/displays adequate comfort level or baseline comfort level  Outcome: Progressing     Problem: Safety - Adult  Goal: Free from fall injury  Outcome: Progressing     Problem: Chronic Conditions and Co-morbidities  Goal: Patient's chronic conditions and co-morbidity symptoms are monitored and maintained or improved  Outcome: Progressing      The clinical goals for the shift include Pt to remain afebrile thru shift.

## 2025-05-11 NOTE — PROGRESS NOTES
Vancomycin Dosing by Pharmacy- FOLLOW UP    Joellen Narayan is a 24 y.o. year old male who Pharmacy has been consulted for vancomycin dosing for osteomyelitis/septic arthritis. Based on the patient's indication and renal status this patient is being dosed based on a goal AUC of 400-600.     Renal function is currently stable.    Current vancomycin dose: 2000 mg given every 8 hours    Estimated vancomycin AUC on current dose: 534 mg/L.hr     Visit Vitals  BP (!) 145/91 (BP Location: Left arm, Patient Position: Lying) Comment: Let Nurse know   Pulse 91   Temp 37.6 °C (99.7 °F) (Temporal)   Resp 17        Lab Results   Component Value Date    CREATININE 0.63 05/10/2025    CREATININE 0.68 2025    CREATININE 0.81 2025    CREATININE 0.59 2025        Patient weight is as follows:   Vitals:    25 0639   Weight: 72.6 kg (160 lb)       Cultures:  Susceptibility data for the encounter in last 14 days.  Collected Specimen Info Organism   25 Fluid from SPUTUM Normal throat raulito        I/O last 3 completed shifts:  In: 3810 (52.5 mL/kg) [P.O.:60; I.V.:250 (3.4 mL/kg); Blood:350; IV Piggyback:3150]  Out: 800 (11 mL/kg) [Urine:775 (0.3 mL/kg/hr); Drains:25]  Weight: 72.6 kg   I/O during current shift:  I/O this shift:  In: 410 [P.O.:360; IV Piggyback:50]  Out: 675 [Urine:675]    Temp (24hrs), Av.8 °C (98.2 °F), Min:36.1 °C (97 °F), Max:37.6 °C (99.7 °F)      Assessment/Plan    Within goal AUC range. Continue current vancomycin regimen.    This dosing regimen is predicted by [employer] to result in the following pharmacokinetic parameters:  Loading dose: N/A  Regimen: 2000 mg IV every 8 hours.  Start time: 08:41 on 2025  Exposure target: AUC24 (range)400-600 mg/L.hr   [id number]: 531 mg/L.hr  AUC24,ss: 534 mg/L.hr  Probability of AUC24 > 400: 98 %  Ctrough,ss: 14 mg/L  Probability of Ctrough,ss > 20: 8 %    The next level will be obtained on  at 0500. May be obtained sooner if clinically  indicated.   Will continue to monitor renal function daily while on vancomycin and order serum creatinine at least every 48 hours if not already ordered.  Follow for continued vancomycin needs, clinical response, and signs/symptoms of toxicity.       NORMA CAMARGO, PharmD

## 2025-05-11 NOTE — CARE PLAN
The patient's goals for the shift include      The clinical goals for the shift include pt will remain free of injury      Problem: Pain - Adult  Goal: Verbalizes/displays adequate comfort level or baseline comfort level  Outcome: Progressing     Problem: Safety - Adult  Goal: Free from fall injury  Outcome: Progressing     Problem: Discharge Planning  Goal: Discharge to home or other facility with appropriate resources  Outcome: Progressing     Problem: Chronic Conditions and Co-morbidities  Goal: Patient's chronic conditions and co-morbidity symptoms are monitored and maintained or improved  Outcome: Progressing     Problem: Nutrition  Goal: Nutrient intake appropriate for maintaining nutritional needs  Outcome: Progressing     Problem: Fall/Injury  Goal: Not fall by end of shift  Outcome: Progressing  Goal: Be free from injury by end of the shift  Outcome: Progressing  Goal: Verbalize understanding of personal risk factors for fall in the hospital  Outcome: Progressing  Goal: Verbalize understanding of risk factor reduction measures to prevent injury from fall in the home  Outcome: Progressing     Problem: Pain  Goal: Takes deep breaths with improved pain control throughout the shift  Outcome: Progressing  Goal: Turns in bed with improved pain control throughout the shift  Outcome: Progressing  Goal: Walks with improved pain control throughout the shift  Outcome: Progressing  Goal: Performs ADL's with improved pain control throughout shift  Outcome: Progressing  Goal: Participates in PT with improved pain control throughout the shift  Outcome: Progressing  Goal: Free from opioid side effects throughout the shift  Outcome: Progressing  Goal: Free from acute confusion related to pain meds throughout the shift  Outcome: Progressing

## 2025-05-11 NOTE — PROGRESS NOTES
"Orthopaedic Surgery Progress Note    S: Pain controlled considering recent surgery. Reports elbow pain improving since prior to surgery. Denies chest pain, shortness of breath, or fevers.    O:  /77 (BP Location: Left arm, Patient Position: Lying)   Pulse 73   Temp 36.6 °C (97.9 °F) (Temporal)   Resp 16   Ht 1.88 m (6' 2\")   Wt 72.6 kg (160 lb)   SpO2 95%   BMI 20.54 kg/m²     Gen: arousable, NAD, appropriately conversational  Cardiac: RRR to peripheral palpation  Resp: nonlabored on RA  GI: soft, nondistended    MSK:  Right upper extremity:   -surgical dressing clean/dry/intact  -Fires axillary/AIN/PIN/ulnar distributions  -SILT axillary/radial/median/ulnar distributions  -Hand warm, well perfused  -Radial pulse not accessible due to splint, however cap refill brisk  -Compartments soft and compressible  - RUE drain w SS output    A/P: 24 y.o. male s/p R elbow I&D, reaming of ulna and humeral shafts on 5/10 with Dr. Tello.      Plan:  -Ortho following cultures, recommend ID consult   - Weight bearing: Nonweightbearing RUE until drain is out  - DVT PPx: SCDs, DVT chemoppx per primary  - Diet: OK for regular diet from orthopedic perspective  - Pain: Multimodal pain regimen per primary  - Antibiotics: on vanc/zosyn, continue pending cultures  - Dressing:  Mepilex remove POD7 (5/17/25)  - PT/OT consult  - Drain in place, please chart output every 8 hours  - Ortho will continue to follow      Loree Pastrana MD  PGY-1 Orthopedic Surgery  [facility]  Available by Epic Message     While admitted, this patient will be followed by the Ortho Trauma Team. Please contact below residents with any questions (available via Epic Chat).     First call: Favian Medellin or Loree Pastrana, PGY-1  Second call: Ramez Garcia, PGY-2  Third call: Linn Recinos, PGY-3      "

## 2025-05-11 NOTE — PROGRESS NOTES
"Joellen Narayan is a 24 y.o. male on day 8 of admission presenting with Sickle cell pain crisis (Multi).    Subjective   No acute overnight events.   Patient seen at bedside s/p OR for L surgical debridement with concern for osteomyelitis. He is complaining of some lower lip swelling today, but otherwise doing well. Still in pain, requiring Dilaudid every 3 hours or so.        Objective     Physical Exam  HENT:      Head: Normocephalic.      Nose: Nose normal.      Mouth/Throat:      Mouth: Mucous membranes are moist.   Eyes:      Pupils: Pupils are equal, round, and reactive to light.   Cardiovascular:      Rate and Rhythm: Normal rate.      Pulses: Normal pulses.   Pulmonary:      Effort: Pulmonary effort is normal.      Comments: 2L oxygen  Abdominal:      Palpations: Abdomen is soft.   Musculoskeletal:         General: No swelling.      Cervical back: Normal range of motion.      Comments: R arm and elbow wrapped in bandage, c/d/i   Skin:     Comments: Right arm swollen and warm to the touch    Neurological:      General: No focal deficit present.      Mental Status: He is alert.   Psychiatric:         Mood and Affect: Mood normal.         Behavior: Behavior normal.         Last Recorded Vitals  Blood pressure 128/77, pulse 73, temperature 36.6 °C (97.9 °F), temperature source Temporal, resp. rate 16, height 1.88 m (6' 2\"), weight 72.6 kg (160 lb), SpO2 95%.  Intake/Output last 3 Shifts:  I/O last 3 completed shifts:  In: 3970 (54.7 mL/kg) [P.O.:420; I.V.:250 (3.4 mL/kg); Blood:350; IV Piggyback:2950]  Out: 1350 (18.6 mL/kg) [Urine:1325 (0.5 mL/kg/hr); Drains:25]  Weight: 72.6 kg     Relevant Results                   This patient is intubated   Reason for patient to remain intubated today? they are planned for extubation trial later today/tonight             Assessment & Plan  Sickle cell pain crisis (Multi)    Septic bursitis of elbow, right    Joellen Narayan 24 y.o. 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 Patient initially 98% RA, however became hypoxic in ED requiring 2-4 liters, unclear etiology for hypoxia at this time, possibly related to poor inspiratory effort in setting of severe pain vs ACS (low suspicion at this time, however close monitoring) vs infectious process. Multiple CXR w/o evidence of acute process. 5/3 CT PE negative, similar GGOs from prior scans unchanged. Pt asymptomatic, denies respiratory s/s. S/p Zosyn (5/3-5/4).  Pt reported worsening R arm pain on 5/5, and X ray with joint effusion, aspiration studies and MRI concerning for septic arthritis and osteomyelitis. S/p OR washout 5/10, ID following.     5/11 updates  -  Hgb only incremented by 1 after 2 units pRBC 5/10, 6.8 today, given adidtional 1 unit pRBC (4 units total so far this admission)  - OR cx no growth so far, continue on vanc and zosyn while awaiting speciation     # VERÓNICA pain/edema c/f osteomyelitis vs septic arthritis   - original etiology sickle cell related vs AVN vs osteomyelitis vs thrombus vs septic arthritis    - no imaging obtained on admit, pt states acute pain feels like sickle cell pain 5/6   - Jan 2025 there was ? hx of OM of lower extremities vs chronic SCD changes but no surgical interventions required at time therefore low threshold for MRI during this admission  - 5/3 EKG NSR, no ST elevation appears similar to prior   - RUE US neg for DVT (5/5)   - ESR/ CRP <1/5.16 (5/4) --> <1/7.85 (5/5) --> <1, 6.15 (5/6) --> CRP 16.01 (5/8)  - 5/5 offered MRI R arm pt declining at this time even when offered with premedication  - attempted (5/7) MRI R arm (pt did not tolerate  exam)  - 5/7 Xray R arm showing large elbow joint effusion   - 5/8 pt became febrile x3, highest 38.8 s/p tylenol 650mg once   - started Zosyn/Vanc (5/8-  ) and IVF x 48hr  - 5/8 Ortho consulted, s/p aspirate (>44k cells), formal recs pending   - MRI w/anesthesia 5/9 adius/humerus showing concern for septic arthritis, osteomyelitis, myositis, severe muscle and subcutaneous soft tissue edema, and possible cellulitis  - OR for washout 5/10  - ID consulted 5/10-appreciate recs   - s/p increased to 5.5mg IVP dilaudid q2 PRN (5/5), offered dPC pt declining at this time as well   - increased to 6mg IVP dilaudid q2h prn for severe pain (5/6-current)     # AHRF, requiring 2-4L on admission  - On 2-3L at night, however patient reports daytime hypoxia as well in the past, currently on 2L 5/11  - unclear at this time, etiology includes acute chest vs poor inspiratory effort in setting of pain vs pneumonia vs PE vs pulm htn  - 5/3 Cxr w/o evidence of acute process   - 5/3 CT PE w/o evidence of PE, similar GGOs from prior likely atelectasis (similar to Dec 2024)   - strep pneum, legionella negative 5/4   - 5/5 ECHO EF 63%, LV severely dilated   - trop 13 (5/5)   - 5/5 repeat CXR neg for acute process, 5/8 CXR w/o evidence acute process   - c/w continuous pulse oximetry  - Continue supportive O2 with NC  - 5/5 EKG NSR  - Flu A/B, covid neg (5/5)  - respx cx 5/4 negative thus far  - low c/f infectious process at this time, empirix atbx discontinued 5/4 per Dr. Correia      # Hgb SS disease   # hx acute chest syndrome   - OARRS reviewed on admission, no aberrancies noted   - Hgb baseline: 9-10, 8.1 on admission, (5/5) Hg 7.5, in setting of worsening hemolysis and pain, s/p 1u pRBC --> Hgb 7.6 (5/6) --> Hgb 8.6 (5/7)  - 5/10 hgb dropped to 5.9-received 2u PRBC  - T.bili: 9.5 LDH: 779 Retic: 13.1 --> (5/7) tbili 11, , retic 17.7%  - leukocytosis noted (5/7) WBC 12.4, likely reactive as pt afebrile with no other s/sx of  infectious process  - Admission, s/p initiating 4mg dilaudid q2h prn, IV toradol 15mg q6h x 3 days, lidocaine patch, flexeril as needed, tylenol   - c/w home folic acid daily  - utox pending   - Exchange labs resulted 5/2-5/5, repeat ordered for 5/9   - 5/4 Hg S 92.8%  - s/p 5.5mg IVP dilaudid q2 PRN severe pain (5/5-5/6)   - start 6mg IV dilaudid q2h PRN for severe pain (5/6-current)  - bowel regimen for opioid induced constipation, zofran for opioid induced nausea, and benadrly for opioid induced pruritis       # hx choledocholithiasis 7/2024  - worsening hemolysis in setting of active infection could be contributing to increased hyperbilirubinemia   - July 2024 s/p ERCP with biliary sphincterotomy where sludge and stones were removed, achieving complete clearance   - 1/31/25 was planned for cholecystectomy with Dr. Dove but no show on day of surgery and again 2/13 and 2/27   - no RUQ pain on exam, however pt with leukocytosis, febrile  - blood cx x2 pending collection 5/8   - LFTs at baseline, bili  - tbili baseline ~7-9, (5/6) 10.6 --> (5/8) 18.2 (5/9) 26.1 (5/10) 22.3  - RUQ US showing cholelithiasis with no cholecystitis as well as hepatomegaly and hepatic steatosis     - LR @ 75 x48 (5/8-5/10)     # Hx Priapism  - no active issues on admit   - hx previously drained by urology   - c/w home sudafed PRN     # Nodular lymphocyte predominant Hodgkins lymphoma (NLPHL)    - Follows with Dr. Stoll  - s/p Rituxan and prednisone q3 weeks (C1 1/18/24, C2 2/8/24, Missed C3 d/t sickle cell pain crisis, C4 4/4/24, C5 5/16/24, C6 6/7/24)   - 3/13 No show to onc appt   - 4/9 PET showed interval development of new FDG focus in mid abdomen  - will need onc appt scheduled prior to discharge      # Hx of nocturnal oxygen dependence  - baseline 2-3 L overnight  - Pulm outpatient appt scheduled back in February  - Will need pulm outpatient scheduling     # prophy  - Enoxaparin   - SCDs, encouraged ambulation      # dispo  -  Code Status: Full Code (confirmed on admission)   - DC home resumed O2 pending infectious workup  - NOK: Melissa mother, [phone] - updated via phone 5/8   - FUV 7/9 sickle cell clinic, onc & pulm FUV requested      I spent 60 minutes in the professional and overall care of this patient.    Patient discussed with Dr. Soheila Gomez MD

## 2025-05-12 LAB
ALBUMIN SERPL BCP-MCNC: 3.4 G/DL (ref 3.4–5)
ALP SERPL-CCNC: 112 U/L (ref 33–120)
ALT SERPL W P-5'-P-CCNC: 34 U/L (ref 10–52)
ANION GAP SERPL CALC-SCNC: 11 MMOL/L (ref 10–20)
AST SERPL W P-5'-P-CCNC: 47 U/L (ref 9–39)
BACTERIA BLD CULT: NORMAL
BACTERIA BLD CULT: NORMAL
BACTERIA SPEC CULT: NORMAL
BASOPHILS # BLD AUTO: 0.03 X10*3/UL (ref 0–0.1)
BASOPHILS NFR BLD AUTO: 0.3 %
BILIRUB SERPL-MCNC: 14.5 MG/DL (ref 0–1.2)
BUN SERPL-MCNC: 6 MG/DL (ref 6–23)
CALCIUM SERPL-MCNC: 8.9 MG/DL (ref 8.6–10.6)
CHLORIDE SERPL-SCNC: 105 MMOL/L (ref 98–107)
CO2 SERPL-SCNC: 27 MMOL/L (ref 21–32)
CREAT SERPL-MCNC: 0.76 MG/DL (ref 0.5–1.3)
EGFRCR SERPLBLD CKD-EPI 2021: >90 ML/MIN/1.73M*2
EOSINOPHIL # BLD AUTO: 0.82 X10*3/UL (ref 0–0.7)
EOSINOPHIL NFR BLD AUTO: 9.2 %
ERYTHROCYTE [DISTWIDTH] IN BLOOD BY AUTOMATED COUNT: 20 % (ref 11.5–14.5)
ERYTHROCYTE [SEDIMENTATION RATE] IN BLOOD BY WESTERGREN METHOD: 5 MM/H (ref 0–15)
GLUCOSE SERPL-MCNC: 77 MG/DL (ref 74–99)
GRAM STN SPEC: NORMAL
HCT VFR BLD AUTO: 20.9 % (ref 41–52)
HGB BLD-MCNC: 7.5 G/DL (ref 13.5–17.5)
HGB RETIC QN: 30 PG (ref 28–38)
IMM GRANULOCYTES # BLD AUTO: 0.06 X10*3/UL (ref 0–0.7)
IMM GRANULOCYTES NFR BLD AUTO: 0.7 % (ref 0–0.9)
IMMATURE RETIC FRACTION: 17.7 %
LDH SERPL L TO P-CCNC: 460 U/L (ref 84–246)
LYMPHOCYTES # BLD AUTO: 1.82 X10*3/UL (ref 1.2–4.8)
LYMPHOCYTES NFR BLD AUTO: 20.5 %
MAGNESIUM SERPL-MCNC: 2.18 MG/DL (ref 1.6–2.4)
MCH RBC QN AUTO: 29.8 PG (ref 26–34)
MCHC RBC AUTO-ENTMCNC: 35.9 G/DL (ref 32–36)
MCV RBC AUTO: 83 FL (ref 80–100)
MONOCYTES # BLD AUTO: 1.19 X10*3/UL (ref 0.1–1)
MONOCYTES NFR BLD AUTO: 13.4 %
NEUTROPHILS # BLD AUTO: 4.95 X10*3/UL (ref 1.2–7.7)
NEUTROPHILS NFR BLD AUTO: 55.9 %
NRBC BLD-RTO: 1.5 /100 WBCS (ref 0–0)
PLATELET # BLD AUTO: 409 X10*3/UL (ref 150–450)
POTASSIUM SERPL-SCNC: 3.6 MMOL/L (ref 3.5–5.3)
PROT SERPL-MCNC: 6 G/DL (ref 6.4–8.2)
RBC # BLD AUTO: 2.52 X10*6/UL (ref 4.5–5.9)
RETICS #: 0.42 X10*6/UL (ref 0.02–0.12)
RETICS/RBC NFR AUTO: 16.7 % (ref 0.5–2)
SODIUM SERPL-SCNC: 139 MMOL/L (ref 136–145)
VANCOMYCIN SERPL-MCNC: 16.7 UG/ML (ref 5–20)
WBC # BLD AUTO: 8.9 X10*3/UL (ref 4.4–11.3)

## 2025-05-12 PROCEDURE — 2500000004 HC RX 250 GENERAL PHARMACY W/ HCPCS (ALT 636 FOR OP/ED): Mod: JZ,TB

## 2025-05-12 PROCEDURE — 99233 SBSQ HOSP IP/OBS HIGH 50: CPT

## 2025-05-12 PROCEDURE — 83615 LACTATE (LD) (LDH) ENZYME: CPT

## 2025-05-12 PROCEDURE — 83735 ASSAY OF MAGNESIUM: CPT

## 2025-05-12 PROCEDURE — 85045 AUTOMATED RETICULOCYTE COUNT: CPT

## 2025-05-12 PROCEDURE — 1170000001 HC PRIVATE ONCOLOGY ROOM DAILY

## 2025-05-12 PROCEDURE — 2500000004 HC RX 250 GENERAL PHARMACY W/ HCPCS (ALT 636 FOR OP/ED): Mod: JZ

## 2025-05-12 PROCEDURE — G0545 PR INHERENT VISIT TO INPT: HCPCS | Performed by: STUDENT IN AN ORGANIZED HEALTH CARE EDUCATION/TRAINING PROGRAM

## 2025-05-12 PROCEDURE — 2500000001 HC RX 250 WO HCPCS SELF ADMINISTERED DRUGS (ALT 637 FOR MEDICARE OP)

## 2025-05-12 PROCEDURE — 84075 ASSAY ALKALINE PHOSPHATASE: CPT

## 2025-05-12 PROCEDURE — 85025 COMPLETE CBC W/AUTO DIFF WBC: CPT

## 2025-05-12 PROCEDURE — 80202 ASSAY OF VANCOMYCIN: CPT

## 2025-05-12 PROCEDURE — 99233 SBSQ HOSP IP/OBS HIGH 50: CPT | Performed by: STUDENT IN AN ORGANIZED HEALTH CARE EDUCATION/TRAINING PROGRAM

## 2025-05-12 PROCEDURE — 97161 PT EVAL LOW COMPLEX 20 MIN: CPT | Mod: GP

## 2025-05-12 PROCEDURE — 85652 RBC SED RATE AUTOMATED: CPT | Performed by: HOSPITALIST

## 2025-05-12 RX ORDER — ERTAPENEM 1 G/1
1 INJECTION, POWDER, LYOPHILIZED, FOR SOLUTION INTRAMUSCULAR; INTRAVENOUS EVERY 24 HOURS
Status: DISCONTINUED | OUTPATIENT
Start: 2025-05-12 | End: 2025-05-16 | Stop reason: HOSPADM

## 2025-05-12 RX ORDER — HYDROMORPHONE HYDROCHLORIDE 1 MG/ML
1 INJECTION, SOLUTION INTRAMUSCULAR; INTRAVENOUS; SUBCUTANEOUS ONCE
Status: COMPLETED | OUTPATIENT
Start: 2025-05-12 | End: 2025-05-12

## 2025-05-12 RX ADMIN — HYDROMORPHONE HYDROCHLORIDE 6 MG: 2 INJECTION, SOLUTION INTRAMUSCULAR; INTRAVENOUS; SUBCUTANEOUS at 03:41

## 2025-05-12 RX ADMIN — KETOROLAC TROMETHAMINE 30 MG: 30 INJECTION, SOLUTION INTRAMUSCULAR; INTRAVENOUS at 00:09

## 2025-05-12 RX ADMIN — Medication 2000 MG: at 08:52

## 2025-05-12 RX ADMIN — HYDROMORPHONE HYDROCHLORIDE 6 MG: 2 INJECTION, SOLUTION INTRAMUSCULAR; INTRAVENOUS; SUBCUTANEOUS at 07:45

## 2025-05-12 RX ADMIN — PIPERACILLIN SODIUM AND TAZOBACTAM SODIUM 3.38 G: 3; .375 INJECTION, SOLUTION INTRAVENOUS at 00:09

## 2025-05-12 RX ADMIN — HYDROMORPHONE HYDROCHLORIDE 6 MG: 2 INJECTION, SOLUTION INTRAMUSCULAR; INTRAVENOUS; SUBCUTANEOUS at 20:15

## 2025-05-12 RX ADMIN — HYDROMORPHONE HYDROCHLORIDE 6 MG: 2 INJECTION, SOLUTION INTRAMUSCULAR; INTRAVENOUS; SUBCUTANEOUS at 12:27

## 2025-05-12 RX ADMIN — PIPERACILLIN SODIUM AND TAZOBACTAM SODIUM 3.38 G: 3; .375 INJECTION, SOLUTION INTRAVENOUS at 05:21

## 2025-05-12 RX ADMIN — PIPERACILLIN SODIUM AND TAZOBACTAM SODIUM 3.38 G: 3; .375 INJECTION, SOLUTION INTRAVENOUS at 12:27

## 2025-05-12 RX ADMIN — Medication 2000 MG: at 16:21

## 2025-05-12 RX ADMIN — ERTAPENEM SODIUM 1 G: 1 INJECTION, POWDER, LYOPHILIZED, FOR SOLUTION INTRAMUSCULAR; INTRAVENOUS at 20:18

## 2025-05-12 RX ADMIN — CYCLOBENZAPRINE 10 MG: 10 TABLET, FILM COATED ORAL at 20:15

## 2025-05-12 RX ADMIN — HYDROMORPHONE HYDROCHLORIDE 6 MG: 2 INJECTION, SOLUTION INTRAMUSCULAR; INTRAVENOUS; SUBCUTANEOUS at 15:20

## 2025-05-12 RX ADMIN — HYDROMORPHONE HYDROCHLORIDE 1 MG: 1 INJECTION, SOLUTION INTRAMUSCULAR; INTRAVENOUS; SUBCUTANEOUS at 11:22

## 2025-05-12 RX ADMIN — Medication 2000 MG: at 01:29

## 2025-05-12 RX ADMIN — HYDROMORPHONE HYDROCHLORIDE 6 MG: 2 INJECTION, SOLUTION INTRAMUSCULAR; INTRAVENOUS; SUBCUTANEOUS at 17:44

## 2025-05-12 RX ADMIN — HYDROMORPHONE HYDROCHLORIDE 6 MG: 2 INJECTION, SOLUTION INTRAMUSCULAR; INTRAVENOUS; SUBCUTANEOUS at 09:47

## 2025-05-12 RX ADMIN — DAPTOMYCIN 450 MG: 500 INJECTION, POWDER, LYOPHILIZED, FOR SOLUTION INTRAVENOUS at 20:17

## 2025-05-12 RX ADMIN — HYDROMORPHONE HYDROCHLORIDE 6 MG: 2 INJECTION, SOLUTION INTRAMUSCULAR; INTRAVENOUS; SUBCUTANEOUS at 22:17

## 2025-05-12 RX ADMIN — CYCLOBENZAPRINE 10 MG: 10 TABLET, FILM COATED ORAL at 08:52

## 2025-05-12 RX ADMIN — KETOROLAC TROMETHAMINE 30 MG: 30 INJECTION, SOLUTION INTRAMUSCULAR; INTRAVENOUS at 12:27

## 2025-05-12 RX ADMIN — FOLIC ACID 1 MG: 1 TABLET ORAL at 08:52

## 2025-05-12 RX ADMIN — ASPIRIN 81 MG: 81 TABLET, CHEWABLE ORAL at 20:15

## 2025-05-12 RX ADMIN — CYCLOBENZAPRINE 10 MG: 10 TABLET, FILM COATED ORAL at 15:20

## 2025-05-12 RX ADMIN — HYDROMORPHONE HYDROCHLORIDE 6 MG: 2 INJECTION, SOLUTION INTRAMUSCULAR; INTRAVENOUS; SUBCUTANEOUS at 00:09

## 2025-05-12 RX ADMIN — HYDROMORPHONE HYDROCHLORIDE 1 MG: 1 INJECTION, SOLUTION INTRAMUSCULAR; INTRAVENOUS; SUBCUTANEOUS at 18:48

## 2025-05-12 RX ADMIN — KETOROLAC TROMETHAMINE 30 MG: 30 INJECTION, SOLUTION INTRAMUSCULAR; INTRAVENOUS at 05:21

## 2025-05-12 RX ADMIN — PIPERACILLIN SODIUM AND TAZOBACTAM SODIUM 3.38 G: 3; .375 INJECTION, SOLUTION INTRAVENOUS at 18:40

## 2025-05-12 RX ADMIN — ASPIRIN 81 MG: 81 TABLET, CHEWABLE ORAL at 08:52

## 2025-05-12 ASSESSMENT — PAIN SCALES - GENERAL
PAINLEVEL_OUTOF10: 9
PAINLEVEL_OUTOF10: 8
PAINLEVEL_OUTOF10: 8
PAINLEVEL_OUTOF10: 9
PAINLEVEL_OUTOF10: 8
PAINLEVEL_OUTOF10: 9
PAINLEVEL_OUTOF10: 10 - WORST POSSIBLE PAIN
PAINLEVEL_OUTOF10: 8
PAINLEVEL_OUTOF10: 9
PAINLEVEL_OUTOF10: 8
PAINLEVEL_OUTOF10: 9
PAINLEVEL_OUTOF10: 8
PAINLEVEL_OUTOF10: 9

## 2025-05-12 ASSESSMENT — PAIN - FUNCTIONAL ASSESSMENT
PAIN_FUNCTIONAL_ASSESSMENT: 0-10

## 2025-05-12 ASSESSMENT — PAIN DESCRIPTION - DESCRIPTORS: DESCRIPTORS: BURNING;SHARP;SHOOTING

## 2025-05-12 ASSESSMENT — COGNITIVE AND FUNCTIONAL STATUS - GENERAL
MOBILITY SCORE: 23
CLIMB 3 TO 5 STEPS WITH RAILING: A LITTLE
MOBILITY SCORE: 24
MOBILITY SCORE: 24
DAILY ACTIVITIY SCORE: 24
DAILY ACTIVITIY SCORE: 24

## 2025-05-12 ASSESSMENT — ACTIVITIES OF DAILY LIVING (ADL): ADL_ASSISTANCE: INDEPENDENT

## 2025-05-12 NOTE — PROGRESS NOTES
Physical Therapy    Physical Therapy Evaluation    Patient Name: Joellen Narayan  MRN: [id number]  Department: [facility]  Room: [address] [place] [address]  Today's Date: 5/12/2025   Time Calculation  Start Time: 1107  Stop Time: 1117  Time Calculation (min): 10 min    Assessment/Plan   PT Assessment  PT Assessment Results: Decreased range of motion, Pain  Rehab Prognosis: Excellent  Barriers to Discharge Home: No anticipated barriers  Evaluation/Treatment Tolerance: Patient limited by pain  Medical Staff Made Aware: Yes  End of Session Communication: Bedside nurse  Assessment Comment: 25yo male now s/p s/p R elbow I&D 5/10 presents with inc pain & dec RUE ROM.  Pt appears near functional baseline with no further acute PT needs - may benefit from OT for RUE.  PT eval only.  End of Session Patient Position: Bed, 2 rail up, Alarm off, not on at start of session  IP OR SWING BED PT PLAN  Inpatient or Swing Bed: Inpatient  PT Plan  PT Plan: PT Eval only  PT Eval Only Reason: No acute PT needs identified  PT Frequency: PT eval only  PT Discharge Recommendations: No further acute PT, No PT needed after discharge (family assist as needed; may benefit from outpt OT for RUE)  Equipment Recommended upon Discharge:  (none)  PT Recommended Transfer Status: Independent, Stand by assist  PT - OK to Discharge: Yes (PT eval completed & recs made)    Subjective   General Visit Information:  General  Reason for Referral: presented 5/4 to [facility] ED with pain in chest, abd, and R arm typical of his acute on chronic sickle cell pain; s/p R elbow I&D 5/10  Past Medical History Relevant to Rehab: nodular lymphocyte predominant Hodgkins lymphoma (NLPHL) (s/p rituxan/prednisone, not on current active chemo), 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  Pleasant, cooperative, willing to participate in therapy assessment  Home Living:  Home Living  Type of Home: House  Lives With: Parent(s) (mom)  Home Layout: One level  Home Access: Stairs to enter with rails  Entrance Stairs-Rails: Right  Entrance Stairs-Number of Steps: 7  Prior Level of Function:  Prior Function Per Pt/Caregiver Report  Level of [place]: Independent with ADLs and functional transfers, Independent with homemaking with ambulation  Receives Help From: Family (if needed)  ADL Assistance: Independent  Ambulatory Assistance: Independent  Vocational: Full time employment (security)  Hand Dominance: Right  Precautions:  Precautions  UE Weight Bearing Status:  (NWB while drain present, progress to WBAT once drain removed ( WBAT in PT session 2/2 drain removed 5/12))  Medical Precautions: No known precautions/limitation     Objective   Pain:  Pain Assessment  0-10 (Numeric) Pain Score: 9  Pain Type: Acute pain, Surgical pain  Pain Location: Arm  Pain Orientation: Right  Pain Descriptors: Burning, Sharp, Shooting  Pain Frequency: Constant/continuous  Pain Interventions: Repositioned, Elevated (RN notified)  Response to Interventions: No change in pain  Cognition:  Cognition  Overall Cognitive Status: Within Functional Limits  Processing Speed: Within funtional limits    General Assessments:     Activity Tolerance  Endurance: Tolerates 10 - 20 min exercise with multiple rests    Sensation  Light Touch: No apparent deficits    Strength  Strength Comments: RUE pain limited  Strength  Strength Comments: RUE pain limited    Perception  Inattention/Neglect: Appears intact  Initiation: Appears intact  Motor Planning: Appears intact  Perseveration: Not present    Coordination  Coordination Comment: finger opp grossly intact in  RUE    Postural Control  Postural Control: Within Functional Limits    Static Sitting Balance  Static Sitting-Balance Support: Feet supported  Static Sitting-Level of Assistance: Independent  Dynamic Sitting Balance  Dynamic Sitting-Balance Support: Feet supported  Dynamic Sitting-Level of Assistance: Independent    Static Standing Balance  Static Standing-Balance Support: No upper extremity supported  Static Standing-Level of Assistance: Independent  Dynamic Standing Balance  Dynamic Standing-Balance Support: No upper extremity supported  Dynamic Standing-Level of Assistance: Independent  Dynamic Standing-Balance: Turning, Forward lean  Functional Assessments:  Bed Mobility  Bed Mobility: Yes  Bed Mobility 1  Bed Mobility 1: Supine to sitting, Sitting to supine  Level of Assistance 1: Independent  Bed Mobility Comments 1: HOB elevated    Transfers  Transfer: Yes  Transfer 1  Technique 1: Sit to stand, Stand to sit  Transfer Level of Assistance 1: Independent    Ambulation/Gait Training  Ambulation/Gait Training Performed: Yes  Ambulation/Gait Training 1  Surface 1: Level tile  Device 1: No device  Assistance 1: Independent  Quality of Gait 1:  (gait appears WNL)  Comments/Distance (ft) 1: ~20 in room (pain limited)    Stairs  Stairs: No (Pt declining trial of stairs, but denies concerns or need to trial prior to dc.  Based on functional mobility in room, anticipate no difficulty)     Object From Floor  Comments: Pt able to squat to floor to plug in phone & return to standing indep without difficulty.  Extremity/Trunk Assessments:  RUE   RUE :  (R hand - able to make loose fist & complete finger opp all digits; wrist flex/ext grossly WFL, shoulder elevation grossly WFL; R elbow flex/ext significantly imp by pain at this time - maintains position in slight elbow flex)  LUE   LUE: Within Functional Limits  RLE   RLE : Within Functional Limits  LLE   LLE : Within Functional Limits  Outcome Measures:  [facility] Basic  Mobility  Turning from your back to your side while in a flat bed without using bedrails: None  Moving from lying on your back to sitting on the side of a flat bed without using bedrails: None  Moving to and from bed to chair (including a wheelchair): None  Standing up from a chair using your arms (e.g. wheelchair or bedside chair): None  To walk in hospital room: None  Climbing 3-5 steps with railing: A little  Basic Mobility - Total Score: 23    Education Documentation  Mobility Training, taught by Brenda Ricketts PT at 5/12/2025  2:50 PM.  Learner: Patient  Readiness: Acceptance  Method: Explanation  Response: Verbalizes Understanding  Comment: safety with mobility    Education Comments  No comments found.      05/12/25 at 2:52 PM - Brenda Ricketts, PT

## 2025-05-12 NOTE — PROGRESS NOTES
Joellen Narayan is a 24 y.o. male on day 9 of admission presenting with Sickle cell pain crisis (Multi).    Subjective   NAEON. Patient seen at bedside this AM. States pain is 9/10 in right arm right above elbow were incision is. States that pain is slowly improving. Does not feel ready to  pain medications today, discussed starting to wean tomorrow. Aware culture results are still pending for final ID recommendations for antibiotics. Last BM . Eating and drinking okay. Denies abdominal pain. Denies SOB, CP, heart palpitations, N/V/D/C.     Objective     Physical Exam  Vitals reviewed.   Constitutional:       Appearance: Normal appearance.   HENT:      Head: Normocephalic and atraumatic.      Nose: Nose normal.      Mouth/Throat:      Mouth: Mucous membranes are moist.      Pharynx: Oropharynx is clear.   Eyes:      General: Scleral icterus present.      Extraocular Movements: Extraocular movements intact.      Pupils: Pupils are equal, round, and reactive to light.   Cardiovascular:      Rate and Rhythm: Normal rate and regular rhythm.      Pulses: Normal pulses.      Heart sounds: Normal heart sounds.   Pulmonary:      Effort: Pulmonary effort is normal.      Breath sounds: Normal breath sounds.      Comments: Room air   Abdominal:      General: Bowel sounds are normal.      Palpations: Abdomen is soft.   Musculoskeletal:         General: No swelling. Normal range of motion.      Cervical back: Normal range of motion.      Comments: R arm and elbow wrapped in bandage, c/d/i   Skin:     General: Skin is warm.      Comments: Mild swelling right arm    Neurological:      General: No focal deficit present.      Mental Status: He is alert and oriented to person, place, and time. Mental status is at baseline.   Psychiatric:         Mood and Affect: Mood normal.         Behavior: Behavior normal.         Last Recorded Vitals  Blood pressure 144/81, pulse 80, temperature 37.1 °C (98.8 °F), temperature source  "Temporal, resp. rate 18, height 1.88 m (6' 2\"), weight 72.6 kg (160 lb), SpO2 93%.  Intake/Output last 3 Shifts:  I/O last 3 completed shifts:  In: 1797.5 (24.8 mL/kg) [P.O.:360; Blood:337.5; IV Piggyback:1100]  Out: 3415 (47.1 mL/kg) [Urine:3400 (1.3 mL/kg/hr); Drains:15]  Weight: 72.6 kg     Relevant Results  Scheduled medications  Scheduled Medications[1]  Continuous medications  Continuous Medications[2]  PRN medications  PRN Medications[3]     Assessment & Plan  Sickle cell pain crisis (Multi)    Septic bursitis of elbow, right    Raundre Sylvain 24 y.o. 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 Patient initially 98% RA, however became hypoxic in ED requiring 2-4 liters, unclear etiology for hypoxia at this time, possibly related to poor inspiratory effort in setting of severe pain vs ACS vs infectious process. Multiple CXR w/o evidence of acute process. 5/3 CT PE negative, similar GGOs from prior scans unchanged. Pt asymptomatic, denies respiratory s/s. On 5/5, pt reported worsening R arm pain. XR w joint effusion (5/7), CRP 16 (5/8), MRI R humerus/ radius (5/9) concerning for septic arthritis vs osteomyelitis elbow joint effusion. Started on zosyn (5/8- ) and Vanc (5/10- ). Ortho consulted, s/p R elbow I&D 5/10. Cultures pending (NGTD 5/12). ID consulted to follow joint aspirate cultures, final recs pending. Ortho removed drain on 5/12. Discharge pending finalization of cultures, ID recs, and pain management.     Updates 5/12:   -  s/p 1 unit pRBCs 5/11 for hgb 6.8, hgb 5/12 7.5   - joint aspirate cultures (NGTD 5/12), continue on vanc and zosyn while " awaiting speciation   - ID recs pending   - Will most likely need PICC placed for long term antibiotics pending ID recs     # VERÓNICA pain and edema   # osteomyelitis vs component of septic arthritis   - original etiology sickle cell related vs AVN vs osteomyelitis vs thrombus vs septic arthritis    - no imaging obtained on admit, pt states acute pain feels like sickle cell pain 5/6   - Jan 2025 there was ? hx of OM of lower extremities vs chronic SCD changes but no surgical interventions required at time therefore low threshold for MRI during this admission  - RUE US neg for DVT (5/5)   - ESR/ CRP <1/5.16 (5/4) --> <1/7.85 (5/5) --> <1, 6.15 (5/6) --> CRP 16.01 (5/8)  - 5/5 offered MRI R arm pt declining at this time even when offered with premedication  - attempted (5/7) MRI R arm (pt did not tolerate exam)  - 5/7 XR R arm showing large elbow joint effusion   - 5/8 pt became febrile x3, highest 38.8 s/p tylenol 650mg once   - started Zosyn (5/8- )/Vanc (5/10- ) and IVF x 48hr  - 5/8 Ortho consulted, s/p aspirate (>44k cells)  - 5/9 MRI w/anesthesia R radius/humerus showing concern for septic arthritis, osteomyelitis, myositis, severe muscle and subcutaneous soft tissue edema, and possible cellulitis  - s/p R elbow I&D 5/10 with ortho   - Tissue/ wound cultures NGTD, Fungal Cultures pending (NGTD 5/12)  - ID consulted 5/10; following cultures; continues to be on Zosyn (5/8- )/Vanc (5/10- )   - s/p increased to 5.5mg IVP dilaudid q2 PRN (5/5), offered dPCA, pt declining at this time as well   - increased to 6mg IVP dilaudid q2h prn for severe pain (5/6-current); will plan on weaning IV dilaudid 5/13   - Ortho recs; WBAT RUE, Mepilex remove POD7 (5/17/25), drain removed 5/12, require 6 weeks total of DVT prophylaxis from an orthopedic standpoint, Calcium/Vitamin D 500mg-400IU BID for 6 weeks, follow up w/ Dr. Tello 2 weeks after surgery for post-operative appointment      # AHRF, requiring 2-4L on admission - resolved    - On 2-3L at night, however patient reports daytime hypoxia as well in the past, currently on 2L 5/11  - unclear at this time, etiology includes acute chest vs poor inspiratory effort in setting of pain vs pneumonia vs PE vs pulm htn  - 5/3 Cxr w/o evidence of acute process   - 5/3 CT PE w/o evidence of PE, similar GGOs from prior likely atelectasis (similar to Dec 2024)   - strep pneum, legionella negative 5/4   - 5/5 ECHO EF 63%, LV severely dilated   - 5/5 repeat CXR neg for acute process, 5/8 CXR w/o evidence acute process   - Flu A/B, covid neg (5/5), respx cx 5/4 negative   - On room air as of 5/12 (nocturnal O2 at night), back to baseline      # Hgb SS disease   - OARRS reviewed on admission, no aberrancies noted   - Hgb baseline: 9-10, 8.1 on admission, (5/5) Hg 7.5, in setting of worsening hemolysis and pain, s/p 1u pRBC --> Hgb 7.6 (5/6) --> Hgb 8.6 (5/7)  - 5/10 hgb dropped to 5.9 -received 2u PRBC  - 5/11 hgb 6.8 s/p 1 unit pRBCs --> hgb 7.5 (5/12)   -  (5/12); -400; improved since admission; CTM   - Bili 26.9 (5/9) --> 14.5 (5/12); CTM   - Leukocytosis most likely I/s/o OM as above; improved since admission   - Admission, s/p initiating 4mg dilaudid q2h prn, IV toradol 15mg q6h x 3 days, lidocaine patch, flexeril as needed, tylenol   - c/w home folic acid daily  - utox pending   - Exchange labs resulted 5/2-5/5, repeat ordered for 5/9   - 5/4 Hg S 92.8%  - s/p 5.5mg IVP dilaudid q2 PRN severe pain (5/5-5/6)   - start 6mg IV dilaudid q2h PRN for severe pain (5/6-current); will plan on decreasing IV dilaudid on 5/13   - bowel regimen for opioid induced constipation, zofran for opioid induced nausea, and benadrly for opioid induced pruritis       # hx choledocholithiasis 7/2024 -improved   - worsening hemolysis in setting of active infection could be contributing to increased hyperbilirubinemia   - July 2024 s/p ERCP with biliary sphincterotomy where sludge and stones were removed, achieving  complete clearance   - 1/31/25 was planned for cholecystectomy with Dr. Dove but no show on day of surgery and again 2/13 and 2/27   - blood cx x2 5/8 NGTD   - tbili baseline ~7-9, (5/6) 10.6 --> (5/8) 18.2 --> (5/9) 26.1 --> (5/10) 22.3 --> (5/12) 14.5   - RUQ US showing cholelithiasis with no cholecystitis as well as hepatomegaly and hepatic steatosis        # Hx Priapism  - no active issues on admit   - hx previously drained by urology   - c/w home sudafed PRN     # Nodular lymphocyte predominant Hodgkins lymphoma (NLPHL)    - Follows with Dr. Stoll  - s/p Rituxan and prednisone q3 weeks (C1 1/18/24, C2 2/8/24, Missed C3 d/t sickle cell pain crisis, C4 4/4/24, C5 5/16/24, C6 6/7/24)   - 3/13 No show to onc appt   - 4/9 PET showed interval development of new FDG focus in mid abdomen  - will need onc appt scheduled prior to discharge      # Hx of nocturnal oxygen dependence  - baseline 2-3 L overnight  - Pulm outpatient appt scheduled back in February  - Will need pulm outpatient scheduling     # prophy  - ASA BID   - PT/OT (5/12)   - SCDs, encouraged ambulation      # dispo  - Code Status: Full Code (confirmed on admission)   - DC home resumed O2 pending infectious workup  - NOK: Melissa, mother, [phone]   - FUV 7/9 sickle cell clinic, onc & pulm FUV requested      I spent 60 minutes in the professional and overall care of this patient.    Assessment and plan as above discussed with attending physician Dr. Bren Arana, APRN-CNP       [1] alteplase, 1 mg, intra-catheter, Once  aspirin, 81 mg, oral, BID  cyclobenzaprine, 10 mg, oral, TID  [Held by provider] enoxaparin, 40 mg, subcutaneous, Daily  folic acid, 1 mg, oral, Daily  HYDROmorphone, 1 mg, intravenous, Once  ketorolac, 30 mg, intravenous, q6h REYNALDO  lidocaine, 5 mL, infiltration, Once  lidocaine, 2 patch, transdermal, Daily  piperacillin-tazobactam, 3.375 g, intravenous, q6h  polyethylene glycol, 17 g, oral, Daily  vancomycin,  2,000 mg, intravenous, q8h  [2]    [3] PRN medications: diphenhydrAMINE, HYDROmorphone, hydrOXYzine HCL, ondansetron, pseudoephedrine, sennosides-docusate sodium, sodium chloride, vancomycin

## 2025-05-12 NOTE — PROGRESS NOTES
Joellen Narayan is a 24 y.o. male on day 9 of admission presenting with Sickle cell pain crisis (Multi).    Subjective   Interval History:  -Overall doing well  -Pain improving including at wrist  -Denies fevers, chills, sweats, rash, diarrhea    Review of Systems    Objective   Range of Vitals (last 24 hours)  Heart Rate:  [71-80]   Temp:  [35.8 °C (96.4 °F)-37.1 °C (98.8 °F)]   Resp:  [16-18]   BP: (125-144)/(62-81)   SpO2:  [93 %-95 %]   Daily Weight  05/03/25 : 72.6 kg (160 lb)    Body mass index is 20.54 kg/m².    Physical Exam  GEN: Awake, alert, NAD  HEENT: Icteric sclera, no OP erythema/exudate  CV: RRR, 2/6 systolic murmur, no rubs  RESP: No substantial crackles on anterior auscultation  ABD: Soft, NT, ND  EXT: No LE edema. RUE in post-operative wrap. No evidence of emboli  MSK: No other joint swelling    Antibiotics  chlorhexidine - 4 %  piperacillin-tazobactam - 3.375 gram/50 mL  vancomycin - 2 gram/500 mL    Relevant Results  Labs  Results from last 72 hours   Lab Units 05/12/25  0814 05/11/25  0543 05/10/25  0436   WBC AUTO x10*3/uL 8.9 11.4* 14.8*   HEMOGLOBIN g/dL 7.5* 6.8* 5.9*   HEMATOCRIT % 20.9* 19.6* 16.0*   PLATELETS AUTO x10*3/uL 409 345 274   NEUTROS PCT AUTO % 55.9 62.2 67.1   LYMPHS PCT AUTO % 20.5 18.1 13.9   MONOS PCT AUTO % 13.4 12.6 13.8   EOS PCT AUTO % 9.2 6.1 4.3     Results from last 72 hours   Lab Units 05/12/25  0814 05/11/25  0543 05/10/25  0436   SODIUM mmol/L 139 137 136   POTASSIUM mmol/L 3.6 4.2 3.7   CHLORIDE mmol/L 105 102 100   CO2 mmol/L 27 27 31   BUN mg/dL 6 6 6   CREATININE mg/dL 0.76 0.78 0.63   GLUCOSE mg/dL 77 75 87   CALCIUM mg/dL 8.9 8.6 8.9   ANION GAP mmol/L 11 12 9*   EGFR mL/min/1.73m*2 >90 >90 >90     Results from last 72 hours   Lab Units 05/12/25  0814 05/11/25  0543 05/10/25  0436   ALK PHOS U/L 112 106 113   BILIRUBIN TOTAL mg/dL 14.5* 18.3* 22.3*   PROTEIN TOTAL g/dL 6.0* 5.3* 6.1*   ALT U/L 34 33 39   AST U/L 47* 50* 54*   ALBUMIN g/dL 3.4 3.3* 3.7      Estimated Creatinine Clearance: 125 mL/min (by C-G formula based on SCr of 0.76 mg/dL).  C-Reactive Protein   Date Value Ref Range Status   05/08/2025 16.01 (H) <1.00 mg/dL Final   05/06/2025 6.15 (H) <1.00 mg/dL Final   05/05/2025 7.85 (H) <1.00 mg/dL Final     Microbiology  Susceptibility data from last 14 days.  Collected Specimen Info Organism   05/04/25 Fluid from SPUTUM Normal throat raulito     -05/08 Bcx: NGTD  -05/08 Joint aspirate: 44K WBC (95% PMN), 20K RBC, negative crystal  -05/08 Joint aspirate culture: 4+ PMN, NGTD  -05/11 OR cultures: NG (fungal, AFB pending)    Imaging  N/A     Assessment/Plan   Joellen Narayan is a 24 y.o. male with nodular lymphocyte predominent HL s/p ritux/pred and HbSS SCD (mild splenic sequestration in 2001, priapism, acute chest syndrome, and nocturnal hypoxia (baseline 2L at night) presenting on 5/3 with sickle cell crisis (noted lower back, inner thigh, and R arm pain) and AHRF with fairly unremarkable CT 5/3 without PE. While his lower back and thigh pain improved with pain medications, he developed progressive R arm pain and swelling starting around the day after admission followed by fevers on 5/8      Unclear if RUE elbow septic arthritis with possible humeral osteomyelitis versus another process (AVN might be on the differential (the description of the humeral findings - extending from distal epiphysis to the proximal diaphysis without T1 enhancement - seems a bit unusual for osteo)) though joint WBC was high. Findings not wholly consistent with hemarthrosis, crystal disease. OR 5/10: serosanguinous fluid in the elbow joint that was not frankly purulent; 2 cultures obtained with debridement and synovial biopsy; humerus debrided with culture/biopsies obtained. No trauma history to the area, may have been hematogenous seeding if infection     Plan  -OK to place PICC  -Will follow-up path and fungal/AFB cultures  -Please order baseline CK  -Can switch to IV daptomycin  6mg/kg q24h and ertapenem 1g q24h (allergic to CTX) through 6/20/25  -Discussed that if path inconsistent with osteomyelitis, we may stop antibiotics early  -Discussed that if side effects arise we may switch to empiric PO regimen  -Please fax weekly CBC with diff, BMP, LFTs, CK to [facility] at [phone] ATTN Jb Altman  -Reviewed side effects and PICC line management  -Requested ID follow-up     ID Team A will plan to sign off, but please contact me via EpicChat through 5/15 with questions.    Jb Altman MD

## 2025-05-12 NOTE — PROGRESS NOTES
"Orthopaedic Surgery Progress Note    S: Pain controlled. Reports elbow pain improving since prior to surgery, but still has pain w short arcs. Denies chest pain, shortness of breath, or fevers.    O:  /76 (BP Location: Left arm, Patient Position: Lying)   Pulse 80   Temp 36.8 °C (98.2 °F) (Temporal)   Resp 16   Ht 1.88 m (6' 2\")   Wt 72.6 kg (160 lb)   SpO2 95%   BMI 20.54 kg/m²     Gen: arousable, NAD, appropriately conversational  Cardiac: RRR to peripheral palpation  Resp: nonlabored on RA  GI: soft, nondistended    MSK:  Right upper extremity:   -surgical dressing clean/dry/intact  -Fires axillary/AIN/PIN/ulnar distributions  -SILT axillary/radial/median/ulnar distributions  -Hand warm, well perfused  -cap refill brisk  -Compartments soft and compressible  - drain removed    A/P: 24 y.o. male s/p R elbow I&D, reaming of ulna and humeral shafts on 5/10 with Dr. Tello. Drain removed 5/12.    Plan:  - Weight bearing: WBAT RUE  - DVT PPx: SCDs, DVT chemoppx per primary  - Diet: OK for regular diet from orthopedic perspective  - Pain: Multimodal pain regimen per primary  - Antibiotics: on vanc/zosyn, pending cultures (NGTD 5/12). Per ID/primary  - Dressing:  Mepilex remove POD7 (5/17/25)  - Drain removed  We will follow peripherally while patient is in house. Pt should be first follow up appointment. Patient will require 6 weeks total of DVT prophylaxis from an orthopedic standpoint, if ok per primary team. Patient currently has mepilex on surgical site. Dressing should be removed POD7. Please send home with Calcium/Vitamin D 500mg-400IU BID for 6 weeks. Patient should follow up w/ Dr. Tello 2 weeks after surgery for post-operative appointment (patient may call [phone] to schedule). Please page with questions.    Discussed w attending, Dr. Tello. Plan not finalized until note signed by attending.    Loree Pastrana MD  PGY-1 Orthopedic Surgery  [facility]  Available by [employer] " Message     While admitted, this patient will be followed by the Ortho Trauma Team. Please contact below residents with any questions (available via Epic Chat).     First call: Loree Pastrana, PGY-1  Second call: Martin Melendez, PGY-2  Third call: Holland Valencia, PGY-3

## 2025-05-12 NOTE — PROGRESS NOTES
Vancomycin Dosing by Pharmacy- FOLLOW UP    Joellen Narayan is a 24 y.o. year old male who Pharmacy has been consulted for vancomycin dosing for osteomyelitis/septic arthritis. Based on the patient's indication and renal status this patient is being dosed based on a goal AUC of 400-600.     Renal function is currently stable.    Current vancomycin dose: 2000 mg given every 8 hours    Estimated vancomycin AUC on current dose: 559 mg/L.hr     Visit Vitals  /81 (BP Location: Left arm, Patient Position: Lying) Comment (BP Location): forearam   Pulse 80   Temp 37.1 °C (98.8 °F) (Temporal)   Resp 18        Lab Results   Component Value Date    CREATININE 0.76 2025    CREATININE 0.78 2025    CREATININE 0.63 05/10/2025    CREATININE 0.68 2025        Patient weight is as follows:   Vitals:    25 0639   Weight: 72.6 kg (160 lb)       Cultures:  Susceptibility data for the encounter in last 14 days.  Collected Specimen Info Organism   25 Fluid from SPUTUM Normal throat raulito        I/O last 3 completed shifts:  In: 1797.5 (24.8 mL/kg) [P.O.:360; Blood:337.5; IV Piggyback:1100]  Out: 3415 (47.1 mL/kg) [Urine:3400 (1.3 mL/kg/hr); Drains:15]  Weight: 72.6 kg   I/O during current shift:  I/O this shift:  In: 500 [IV Piggyback:500]  Out: -     Temp (24hrs), Av.7 °C (98.1 °F), Min:35.8 °C (96.4 °F), Max:37.1 °C (98.8 °F)      Assessment/Plan    Within goal AUC range. Continue current vancomycin regimen.    This dosing regimen is predicted by [employer] to result in the following pharmacokinetic parameters:  Loading dose: N/A  Regimen: 2000 mg IV every 8 hours.  Start time: 16:52 on 2025  Exposure target: AUC24 (range)400-600 mg/L.hr   [id number]: 559 mg/L.hr  AUC24,ss: 559 mg/L.hr  Probability of AUC24 > 400: 100 %    The next level will be obtained on 5/15 at 0500. May be obtained sooner if clinically indicated.   Will continue to monitor renal function daily while on vancomycin and order  serum creatinine at least every 48 hours if not already ordered.  Follow for continued vancomycin needs, clinical response, and signs/symptoms of toxicity.       KAREN CALDWELL, PharmD

## 2025-05-12 NOTE — CARE PLAN
The patient's goals for the shift include      The clinical goals for the shift include Pt will have decreased pain through end of shift 5/12/2025 1900      Problem: Pain - Adult  Goal: Verbalizes/displays adequate comfort level or baseline comfort level  Outcome: Progressing     Problem: Safety - Adult  Goal: Free from fall injury  Outcome: Progressing     Problem: Discharge Planning  Goal: Discharge to home or other facility with appropriate resources  Outcome: Progressing     Problem: Chronic Conditions and Co-morbidities  Goal: Patient's chronic conditions and co-morbidity symptoms are monitored and maintained or improved  Outcome: Progressing     Problem: Nutrition  Goal: Nutrient intake appropriate for maintaining nutritional needs  Outcome: Progressing     Problem: Fall/Injury  Goal: Not fall by end of shift  Outcome: Progressing  Goal: Be free from injury by end of the shift  Outcome: Progressing  Goal: Verbalize understanding of personal risk factors for fall in the hospital  Outcome: Progressing  Goal: Verbalize understanding of risk factor reduction measures to prevent injury from fall in the home  Outcome: Progressing     Problem: Pain  Goal: Takes deep breaths with improved pain control throughout the shift  Outcome: Progressing  Goal: Turns in bed with improved pain control throughout the shift  Outcome: Progressing  Goal: Walks with improved pain control throughout the shift  Outcome: Progressing  Goal: Performs ADL's with improved pain control throughout shift  Outcome: Progressing  Goal: Participates in PT with improved pain control throughout the shift  Outcome: Progressing  Goal: Free from opioid side effects throughout the shift  Outcome: Progressing  Goal: Free from acute confusion related to pain meds throughout the shift  Outcome: Progressing

## 2025-05-12 NOTE — CARE PLAN
Problem: Pain - Adult  Goal: Verbalizes/displays adequate comfort level or baseline comfort level  Outcome: Progressing     Problem: Safety - Adult  Goal: Free from fall injury  Outcome: Progressing     Problem: Discharge Planning  Goal: Discharge to home or other facility with appropriate resources  Outcome: Progressing     Problem: Chronic Conditions and Co-morbidities  Goal: Patient's chronic conditions and co-morbidity symptoms are monitored and maintained or improved  Outcome: Progressing     Problem: Nutrition  Goal: Nutrient intake appropriate for maintaining nutritional needs  Outcome: Progressing     Problem: Fall/Injury  Goal: Not fall by end of shift  Outcome: Progressing  Goal: Be free from injury by end of the shift  Outcome: Progressing  Goal: Verbalize understanding of personal risk factors for fall in the hospital  Outcome: Progressing  Goal: Verbalize understanding of risk factor reduction measures to prevent injury from fall in the home  Outcome: Progressing     Problem: Pain  Goal: Takes deep breaths with improved pain control throughout the shift  Outcome: Progressing  Goal: Turns in bed with improved pain control throughout the shift  Outcome: Progressing  Goal: Walks with improved pain control throughout the shift  Outcome: Progressing  Goal: Performs ADL's with improved pain control throughout shift  Outcome: Progressing  Goal: Participates in PT with improved pain control throughout the shift  Outcome: Progressing  Goal: Free from opioid side effects throughout the shift  Outcome: Progressing  Goal: Free from acute confusion related to pain meds throughout the shift  Outcome: Progressing   The patient's goals for the shift include      The clinical goals for the shift include pt to remain safe throughout the night and pain controlled 5/11/25@1930    Pt VSS.

## 2025-05-12 NOTE — CARE PLAN
The patient's goals for the shift include      The clinical goals for the shift include pt will remain injury free    Problem: Pain - Adult  Goal: Verbalizes/displays adequate comfort level or baseline comfort level  Outcome: Progressing     Problem: Safety - Adult  Goal: Free from fall injury  Outcome: Progressing     Problem: Discharge Planning  Goal: Discharge to home or other facility with appropriate resources  Outcome: Progressing     Problem: Chronic Conditions and Co-morbidities  Goal: Patient's chronic conditions and co-morbidity symptoms are monitored and maintained or improved  Outcome: Progressing     Problem: Nutrition  Goal: Nutrient intake appropriate for maintaining nutritional needs  Outcome: Progressing     Problem: Fall/Injury  Goal: Not fall by end of shift  Outcome: Progressing  Goal: Be free from injury by end of the shift  Outcome: Progressing  Goal: Verbalize understanding of personal risk factors for fall in the hospital  Outcome: Progressing  Goal: Verbalize understanding of risk factor reduction measures to prevent injury from fall in the home  Outcome: Progressing     Problem: Pain  Goal: Takes deep breaths with improved pain control throughout the shift  Outcome: Progressing  Goal: Turns in bed with improved pain control throughout the shift  Outcome: Progressing  Goal: Walks with improved pain control throughout the shift  Outcome: Progressing  Goal: Performs ADL's with improved pain control throughout shift  Outcome: Progressing  Goal: Participates in PT with improved pain control throughout the shift  Outcome: Progressing  Goal: Free from opioid side effects throughout the shift  Outcome: Progressing  Goal: Free from acute confusion related to pain meds throughout the shift  Outcome: Progressing

## 2025-05-12 NOTE — CONSULTS
Vancomycin Dosing by Pharmacy- Cessation of Therapy    Consult to pharmacy for vancomycin dosing has been discontinued by the prescriber, pharmacy will sign off at this time.    Please call pharmacy if there are further questions or re-enter a consult if vancomycin is resumed.     KAREN CALDWELL, PharmD

## 2025-05-13 ENCOUNTER — HOME HEALTH ADMISSION (OUTPATIENT)
Dept: HOME HEALTH SERVICES | Facility: HOME HEALTH | Age: 25
End: 2025-05-13
Payer: COMMERCIAL

## 2025-05-13 ENCOUNTER — PHARMACY VISIT (OUTPATIENT)
Dept: PHARMACY | Facility: CLINIC | Age: 25
End: 2025-05-13
Payer: COMMERCIAL

## 2025-05-13 ENCOUNTER — APPOINTMENT (OUTPATIENT)
Dept: HEMATOLOGY/ONCOLOGY | Facility: HOSPITAL | Age: 25
End: 2025-05-13
Payer: COMMERCIAL

## 2025-05-13 LAB
ALBUMIN SERPL BCP-MCNC: 3.8 G/DL (ref 3.4–5)
ALP SERPL-CCNC: 167 U/L (ref 33–120)
ALT SERPL W P-5'-P-CCNC: 39 U/L (ref 10–52)
ANION GAP SERPL CALC-SCNC: 13 MMOL/L (ref 10–20)
AST SERPL W P-5'-P-CCNC: 60 U/L (ref 9–39)
BASOPHILS # BLD AUTO: 0.05 X10*3/UL (ref 0–0.1)
BASOPHILS NFR BLD AUTO: 0.5 %
BILIRUB SERPL-MCNC: 11.6 MG/DL (ref 0–1.2)
BUN SERPL-MCNC: 5 MG/DL (ref 6–23)
CALCIUM SERPL-MCNC: 9.3 MG/DL (ref 8.6–10.6)
CHLORIDE SERPL-SCNC: 102 MMOL/L (ref 98–107)
CK SERPL-CCNC: 45 U/L (ref 0–325)
CO2 SERPL-SCNC: 27 MMOL/L (ref 21–32)
CREAT SERPL-MCNC: 0.52 MG/DL (ref 0.5–1.3)
EGFRCR SERPLBLD CKD-EPI 2021: >90 ML/MIN/1.73M*2
EOSINOPHIL # BLD AUTO: 0.74 X10*3/UL (ref 0–0.7)
EOSINOPHIL NFR BLD AUTO: 6.9 %
ERYTHROCYTE [DISTWIDTH] IN BLOOD BY AUTOMATED COUNT: 21.3 % (ref 11.5–14.5)
GLUCOSE SERPL-MCNC: 121 MG/DL (ref 74–99)
HCT VFR BLD AUTO: 22.6 % (ref 41–52)
HGB BLD-MCNC: 7.8 G/DL (ref 13.5–17.5)
HGB RETIC QN: 29 PG (ref 28–38)
HYPOCHROMIA BLD QL SMEAR: NORMAL
IMM GRANULOCYTES # BLD AUTO: 0.08 X10*3/UL (ref 0–0.7)
IMM GRANULOCYTES NFR BLD AUTO: 0.7 % (ref 0–0.9)
IMMATURE RETIC FRACTION: 18.2 %
LDH SERPL L TO P-CCNC: 498 U/L (ref 84–246)
LYMPHOCYTES # BLD AUTO: 2.29 X10*3/UL (ref 1.2–4.8)
LYMPHOCYTES NFR BLD AUTO: 21.4 %
MCH RBC QN AUTO: 29 PG (ref 26–34)
MCHC RBC AUTO-ENTMCNC: 34.5 G/DL (ref 32–36)
MCV RBC AUTO: 84 FL (ref 80–100)
MONOCYTES # BLD AUTO: 1.32 X10*3/UL (ref 0.1–1)
MONOCYTES NFR BLD AUTO: 12.3 %
NEUTROPHILS # BLD AUTO: 6.23 X10*3/UL (ref 1.2–7.7)
NEUTROPHILS NFR BLD AUTO: 58.2 %
NRBC BLD-RTO: 1.3 /100 WBCS (ref 0–0)
PAPPENHEIMER BOD BLD QL SMEAR: PRESENT
PLATELET # BLD AUTO: 465 X10*3/UL (ref 150–450)
POLYCHROMASIA BLD QL SMEAR: NORMAL
POTASSIUM SERPL-SCNC: 3.6 MMOL/L (ref 3.5–5.3)
PROT SERPL-MCNC: 6.9 G/DL (ref 6.4–8.2)
RBC # BLD AUTO: 2.69 X10*6/UL (ref 4.5–5.9)
RBC MORPH BLD: NORMAL
RETICS #: 0.45 X10*6/UL (ref 0.02–0.12)
RETICS/RBC NFR AUTO: 16.7 % (ref 0.5–2)
SICKLE CELLS BLD QL SMEAR: NORMAL
SODIUM SERPL-SCNC: 138 MMOL/L (ref 136–145)
TARGETS BLD QL SMEAR: NORMAL
WBC # BLD AUTO: 10.7 X10*3/UL (ref 4.4–11.3)

## 2025-05-13 PROCEDURE — 2500000001 HC RX 250 WO HCPCS SELF ADMINISTERED DRUGS (ALT 637 FOR MEDICARE OP)

## 2025-05-13 PROCEDURE — 99233 SBSQ HOSP IP/OBS HIGH 50: CPT

## 2025-05-13 PROCEDURE — 2500000004 HC RX 250 GENERAL PHARMACY W/ HCPCS (ALT 636 FOR OP/ED): Mod: JZ

## 2025-05-13 PROCEDURE — 85025 COMPLETE CBC W/AUTO DIFF WBC: CPT

## 2025-05-13 PROCEDURE — 86140 C-REACTIVE PROTEIN: CPT

## 2025-05-13 PROCEDURE — RXMED WILLOW AMBULATORY MEDICATION CHARGE

## 2025-05-13 PROCEDURE — 97165 OT EVAL LOW COMPLEX 30 MIN: CPT | Mod: GO | Performed by: OCCUPATIONAL THERAPIST

## 2025-05-13 PROCEDURE — 2500000004 HC RX 250 GENERAL PHARMACY W/ HCPCS (ALT 636 FOR OP/ED): Mod: JZ,TB

## 2025-05-13 PROCEDURE — 83615 LACTATE (LD) (LDH) ENZYME: CPT

## 2025-05-13 PROCEDURE — 85045 AUTOMATED RETICULOCYTE COUNT: CPT

## 2025-05-13 PROCEDURE — 1170000001 HC PRIVATE ONCOLOGY ROOM DAILY

## 2025-05-13 PROCEDURE — 82550 ASSAY OF CK (CPK): CPT | Performed by: HOSPITALIST

## 2025-05-13 PROCEDURE — 87081 CULTURE SCREEN ONLY: CPT

## 2025-05-13 PROCEDURE — 80053 COMPREHEN METABOLIC PANEL: CPT

## 2025-05-13 RX ORDER — NAPROXEN SODIUM 220 MG/1
81 TABLET, FILM COATED ORAL 2 TIMES DAILY
Qty: 84 TABLET | Refills: 0 | Status: SHIPPED
Start: 2025-05-13 | End: 2025-05-13

## 2025-05-13 RX ORDER — LIDOCAINE HYDROCHLORIDE 10 MG/ML
5 INJECTION, SOLUTION INFILTRATION; PERINEURAL ONCE
Status: DISCONTINUED | OUTPATIENT
Start: 2025-05-13 | End: 2025-05-16 | Stop reason: SDUPTHER

## 2025-05-13 RX ORDER — ERTAPENEM 1 G/1
1 INJECTION, POWDER, LYOPHILIZED, FOR SOLUTION INTRAMUSCULAR; INTRAVENOUS EVERY 24 HOURS
Qty: 1900 ML | Refills: 1 | Status: ON HOLD
Start: 2025-05-13 | End: 2025-06-20

## 2025-05-13 RX ORDER — NAPROXEN SODIUM 220 MG/1
81 TABLET, FILM COATED ORAL 2 TIMES DAILY
Qty: 84 TABLET | Refills: 0 | Status: ON HOLD | OUTPATIENT
Start: 2025-05-13

## 2025-05-13 RX ORDER — HYDROMORPHONE HYDROCHLORIDE 1 MG/ML
1 INJECTION, SOLUTION INTRAMUSCULAR; INTRAVENOUS; SUBCUTANEOUS ONCE
Status: COMPLETED | OUTPATIENT
Start: 2025-05-13 | End: 2025-05-13

## 2025-05-13 RX ORDER — HYDROMORPHONE HYDROCHLORIDE 1 MG/ML
1 INJECTION, SOLUTION INTRAMUSCULAR; INTRAVENOUS; SUBCUTANEOUS ONCE
Status: DISCONTINUED | OUTPATIENT
Start: 2025-05-13 | End: 2025-05-13

## 2025-05-13 RX ORDER — PNV NO.95/FERROUS FUM/FOLIC AC 28MG-0.8MG
1 TABLET ORAL 2 TIMES DAILY
Qty: 84 TABLET | Refills: 0 | Status: SHIPPED
Start: 2025-05-13 | End: 2025-05-13

## 2025-05-13 RX ORDER — PNV NO.95/FERROUS FUM/FOLIC AC 28MG-0.8MG
1 TABLET ORAL 2 TIMES DAILY
Qty: 84 TABLET | Refills: 0 | Status: ON HOLD | OUTPATIENT
Start: 2025-05-13 | End: 2025-06-27

## 2025-05-13 RX ADMIN — HYDROMORPHONE HYDROCHLORIDE 4 MG: 2 INJECTION, SOLUTION INTRAMUSCULAR; INTRAVENOUS; SUBCUTANEOUS at 11:43

## 2025-05-13 RX ADMIN — HYDROMORPHONE HYDROCHLORIDE 6 MG: 2 INJECTION, SOLUTION INTRAMUSCULAR; INTRAVENOUS; SUBCUTANEOUS at 02:03

## 2025-05-13 RX ADMIN — HYDROMORPHONE HYDROCHLORIDE 4 MG: 2 INJECTION, SOLUTION INTRAMUSCULAR; INTRAVENOUS; SUBCUTANEOUS at 23:32

## 2025-05-13 RX ADMIN — HYDROMORPHONE HYDROCHLORIDE 4 MG: 2 INJECTION, SOLUTION INTRAMUSCULAR; INTRAVENOUS; SUBCUTANEOUS at 19:26

## 2025-05-13 RX ADMIN — HYDROMORPHONE HYDROCHLORIDE 6 MG: 2 INJECTION, SOLUTION INTRAMUSCULAR; INTRAVENOUS; SUBCUTANEOUS at 00:03

## 2025-05-13 RX ADMIN — CYCLOBENZAPRINE 10 MG: 10 TABLET, FILM COATED ORAL at 21:15

## 2025-05-13 RX ADMIN — ASPIRIN 81 MG: 81 TABLET, CHEWABLE ORAL at 21:15

## 2025-05-13 RX ADMIN — DAPTOMYCIN 450 MG: 500 INJECTION, POWDER, LYOPHILIZED, FOR SOLUTION INTRAVENOUS at 21:15

## 2025-05-13 RX ADMIN — HYDROMORPHONE HYDROCHLORIDE 4 MG: 2 INJECTION, SOLUTION INTRAMUSCULAR; INTRAVENOUS; SUBCUTANEOUS at 21:26

## 2025-05-13 RX ADMIN — HYDROMORPHONE HYDROCHLORIDE 4 MG: 2 INJECTION, SOLUTION INTRAMUSCULAR; INTRAVENOUS; SUBCUTANEOUS at 09:43

## 2025-05-13 RX ADMIN — HYDROMORPHONE HYDROCHLORIDE 4 MG: 2 INJECTION, SOLUTION INTRAMUSCULAR; INTRAVENOUS; SUBCUTANEOUS at 14:36

## 2025-05-13 RX ADMIN — CYCLOBENZAPRINE 10 MG: 10 TABLET, FILM COATED ORAL at 14:36

## 2025-05-13 RX ADMIN — HYDROMORPHONE HYDROCHLORIDE 6 MG: 2 INJECTION, SOLUTION INTRAMUSCULAR; INTRAVENOUS; SUBCUTANEOUS at 06:10

## 2025-05-13 RX ADMIN — HYDROMORPHONE HYDROCHLORIDE 1 MG: 1 INJECTION, SOLUTION INTRAMUSCULAR; INTRAVENOUS; SUBCUTANEOUS at 22:09

## 2025-05-13 RX ADMIN — ASPIRIN 81 MG: 81 TABLET, CHEWABLE ORAL at 09:43

## 2025-05-13 RX ADMIN — FOLIC ACID 1 MG: 1 TABLET ORAL at 09:43

## 2025-05-13 RX ADMIN — ERTAPENEM SODIUM 1 G: 1 INJECTION, POWDER, LYOPHILIZED, FOR SOLUTION INTRAMUSCULAR; INTRAVENOUS at 21:56

## 2025-05-13 RX ADMIN — HYDROMORPHONE HYDROCHLORIDE 4 MG: 2 INJECTION, SOLUTION INTRAMUSCULAR; INTRAVENOUS; SUBCUTANEOUS at 16:42

## 2025-05-13 RX ADMIN — HYDROMORPHONE HYDROCHLORIDE 6 MG: 2 INJECTION, SOLUTION INTRAMUSCULAR; INTRAVENOUS; SUBCUTANEOUS at 04:10

## 2025-05-13 RX ADMIN — CYCLOBENZAPRINE 10 MG: 10 TABLET, FILM COATED ORAL at 09:43

## 2025-05-13 ASSESSMENT — PAIN SCALES - GENERAL
PAINLEVEL_OUTOF10: 10 - WORST POSSIBLE PAIN
PAINLEVEL_OUTOF10: 9
PAINLEVEL_OUTOF10: 8
PAINLEVEL_OUTOF10: 9
PAINLEVEL_OUTOF10: 10 - WORST POSSIBLE PAIN
PAINLEVEL_OUTOF10: 8
PAINLEVEL_OUTOF10: 7
PAINLEVEL_OUTOF10: 9
PAINLEVEL_OUTOF10: 8
PAINLEVEL_OUTOF10: 10 - WORST POSSIBLE PAIN
PAINLEVEL_OUTOF10: 8
PAINLEVEL_OUTOF10: 9
PAINLEVEL_OUTOF10: 8
PAINLEVEL_OUTOF10: 10 - WORST POSSIBLE PAIN
PAINLEVEL_OUTOF10: 10 - WORST POSSIBLE PAIN
PAINLEVEL_OUTOF10: 9
PAINLEVEL_OUTOF10: 8

## 2025-05-13 ASSESSMENT — COGNITIVE AND FUNCTIONAL STATUS - GENERAL
DRESSING REGULAR UPPER BODY CLOTHING: A LITTLE
EATING MEALS: A LITTLE
DRESSING REGULAR LOWER BODY CLOTHING: A LITTLE
DRESSING REGULAR UPPER BODY CLOTHING: A LITTLE
HELP NEEDED FOR BATHING: A LOT
DAILY ACTIVITIY SCORE: 21
DRESSING REGULAR LOWER BODY CLOTHING: A LITTLE
HELP NEEDED FOR BATHING: A LITTLE
HELP NEEDED FOR BATHING: A LITTLE
MOBILITY SCORE: 24
DAILY ACTIVITIY SCORE: 20
PERSONAL GROOMING: A LITTLE
MOBILITY SCORE: 24
DAILY ACTIVITIY SCORE: 21

## 2025-05-13 ASSESSMENT — ACTIVITIES OF DAILY LIVING (ADL)
ADL_ASSISTANCE: INDEPENDENT
BATHING_ASSISTANCE: MODERATE

## 2025-05-13 ASSESSMENT — PAIN DESCRIPTION - DESCRIPTORS: DESCRIPTORS: THROBBING

## 2025-05-13 ASSESSMENT — PAIN DESCRIPTION - ORIENTATION: ORIENTATION: RIGHT

## 2025-05-13 ASSESSMENT — PAIN DESCRIPTION - LOCATION: LOCATION: ARM

## 2025-05-13 NOTE — PROGRESS NOTES
Joellen Narayan is a 24 y.o. male on day 10 of admission presenting with Sickle cell pain crisis (Multi).    Subjective   NAEON. Patient seen at bedside this AM. Looks more comfortable than yesterday, states pain continues in right elbow, rating pain 7/10. Discussed plan for decreasing pain medications today. Also discussed final ID and ortho recs. He is very apprehensive about getting a PICC placed but understands reason for placement. Last BM 5/12. Eating and drinking okay. Denies abdominal pain. Denies SOB, CP, heart palpitations, N/V/D/C.     Objective     Physical Exam  Vitals reviewed.   Constitutional:       Appearance: Normal appearance.   HENT:      Head: Normocephalic and atraumatic.      Nose: Nose normal.      Mouth/Throat:      Mouth: Mucous membranes are moist.      Pharynx: Oropharynx is clear.   Eyes:      General: Scleral icterus present.      Extraocular Movements: Extraocular movements intact.      Pupils: Pupils are equal, round, and reactive to light.   Cardiovascular:      Rate and Rhythm: Normal rate and regular rhythm.      Pulses: Normal pulses.      Heart sounds: Normal heart sounds.   Pulmonary:      Effort: Pulmonary effort is normal.      Breath sounds: Normal breath sounds.      Comments: Room air   Abdominal:      General: Bowel sounds are normal.      Palpations: Abdomen is soft.   Musculoskeletal:         General: No swelling. Normal range of motion.      Cervical back: Normal range of motion.      Comments: R arm and elbow wrapped in bandage, c/d/i   Skin:     General: Skin is warm.      Comments: Mild swelling right arm    Neurological:      General: No focal deficit present.      Mental Status: He is alert and oriented to person, place, and time. Mental status is at baseline.   Psychiatric:         Mood and Affect: Mood normal.         Behavior: Behavior normal.         Last Recorded Vitals  Blood pressure 124/76, pulse 82, temperature 36.8 °C (98.2 °F), temperature source  "Temporal, resp. rate 16, height 1.88 m (6' 2\"), weight 72.6 kg (160 lb), SpO2 93%.  Intake/Output last 3 Shifts:  I/O last 3 completed shifts:  In: 1159 (16 mL/kg) [IV Piggyback:1159]  Out: 6175 (85.1 mL/kg) [Urine:6175 (2.4 mL/kg/hr)]  Weight: 72.6 kg     Relevant Results  Scheduled medications  Scheduled Medications[1]  Continuous medications  Continuous Medications[2]  PRN medications  PRN Medications[3]     Assessment & Plan  Sickle cell pain crisis (Multi)    Septic bursitis of elbow, right    Raundre Sylvain 24 y.o. 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 Patient initially 98% RA, however became hypoxic in ED requiring 2-4 liters, unclear etiology for hypoxia at this time, possibly related to poor inspiratory effort in setting of severe pain vs ACS vs infectious process. Multiple CXR w/o evidence of acute process. 5/3 CT PE negative, similar GGOs from prior scans unchanged. Pt asymptomatic, denies respiratory s/s. On 5/5, pt reported worsening R arm pain. XR w joint effusion (5/7), CRP 16 (5/8), MRI R humerus/ radius (5/9) concerning for septic arthritis vs osteomyelitis elbow joint effusion. Ortho consulted, s/p R elbow I&D 5/10. Cultures pending (NGTD 5/12). S/p zosyn (5/8-5/12) and Vanc (5/10-12). ID consulted, recommending IV daptomycin and ertapenem until 6/20/25 (started on 5/12). Ortho removed drain on 5/12. Discharge pending improvement in pain and PICC line placement for homegoing antibiotics.      Updates 5/13:   - ID recommending IV daptomycin and ertapenem until 6/20/25  - CK 45 (for baseline per ID)   - PICC ordered for homegoing antibiotics "     # VERÓNICA pain and edema   # osteomyelitis vs component of septic arthritis   - original etiology sickle cell related vs AVN vs osteomyelitis vs thrombus vs septic arthritis    - no imaging obtained on admit, pt states acute pain feels like sickle cell pain 5/6   - Jan 2025 there was ? hx of OM of lower extremities vs chronic SCD changes but no surgical interventions required at time therefore low threshold for MRI during this admission  - RUE US neg for DVT (5/5)   - ESR/ CRP <1/5.16 (5/4) --> <1/7.85 (5/5) --> <1, 6.15 (5/6) --> CRP 16.01 (5/8)  - 5/5 offered MRI R arm pt declining at this time even when offered with premedication  - attempted (5/7) MRI R arm (pt did not tolerate exam)  - 5/7 XR R arm showing large elbow joint effusion   - 5/8 pt became febrile x3, highest 38.8 s/p tylenol 650mg once   - started Zosyn (5/8- )/Vanc (5/10- ) and IVF x 48hr  - 5/8 Ortho consulted, s/p aspirate (>44k cells)  - 5/9 MRI w/anesthesia R radius/humerus showing concern for septic arthritis, osteomyelitis, myositis, severe muscle and subcutaneous soft tissue edema, and possible cellulitis  - s/p R elbow I&D 5/10 with ortho   - Tissue/ wound cultures NGTD, Fungal Cultures pending (NGTD 5/12)  - ID consulted 5/10; following cultures; s/p Zosyn (5/8-5/12)/Vanc (5/10-5/12)   - 5/12 final Ortho recs; WBAT RUE, Mepilex remove POD7 (5/17/25), drain removed 5/12, require 6 weeks total of DVT prophylaxis from an orthopedic standpoint, Calcium/Vitamin D 500mg-400IU BID for 6 weeks, follow up w/ Dr. Tello 2 weeks after surgery for post-operative appointment   - 5/13, final ID recs for long term antibiotics; IV daptomycin 6mg/kg q24h and ertapenem 1g q24h until 6/20/25  - CK 45 (for baseline per ID)   - PICC ordered for homegoing antibiotics      # Hgb SS disease   - OARRS reviewed on admission, no aberrancies noted   - Hgb baseline: 9-10, 8.1 on admission, (5/5) Hg 7.5, in setting of worsening hemolysis and pain, s/p 1u pRBC --> Hgb  7.6 (5/6) --> Hgb 8.6 (5/7)  - 5/10 hgb dropped to 5.9 -received 2u PRBC  - 5/11 hgb 6.8 s/p 1 unit pRBCs --> hgb 7.5 (5/12)   -  (5/12); -400; improved since admission; CTM   - Bili 26.9 (5/9) --> 14.5 (5/12); CTM   - Leukocytosis most likely I/s/o OM as above; improved since admission   - Admission, s/p initiating 4mg dilaudid q2h prn, IV toradol 15mg q6h x 3 days, lidocaine patch, flexeril as needed, tylenol   - s/p increased to 5.5mg IVP dilaudid q2 PRN (5/5), offered dPCA, pt declining at this time as well   - s/p 6mg IVP dilaudid q2h prn for severe pain (5/6-5/13)  - decreased dilaudid to 4mg IVP Q2hrs (5/13- ); states he would like to try rotating on 5/14   - c/w home folic acid daily  - utox pending   - Exchange labs resulted 5/2-5/5, repeat ordered for 5/9   - 5/4 Hg S 92.8%  - s/p 5.5mg IVP dilaudid q2 PRN severe pain (5/5-5/6)   - start 6mg IV dilaudid q2h PRN for severe pain (5/6-current); will plan on decreasing IV dilaudid on 5/13   - bowel regimen for opioid induced constipation, zofran for opioid induced nausea, and benadrly for opioid induced pruritis      # AHRF, requiring 2-4L on admission - resolved   - On 2-3L at night, however patient reports daytime hypoxia as well in the past, currently on 2L 5/11  - unclear at this time, etiology includes acute chest vs poor inspiratory effort in setting of pain vs pneumonia vs PE vs pulm htn  - 5/3 Cxr w/o evidence of acute process   - 5/3 CT PE w/o evidence of PE, similar GGOs from prior likely atelectasis (similar to Dec 2024)   - strep pneum, legionella negative 5/4   - 5/5 ECHO EF 63%, LV severely dilated   - 5/5 repeat CXR neg for acute process, 5/8 CXR w/o evidence acute process   - Flu A/B, covid neg (5/5), respx cx 5/4 negative   - On room air as of 5/12 (nocturnal O2 at night), back to baseline      # hx choledocholithiasis 7/2024 -improved   - worsening hemolysis in setting of active infection could be contributing to increased  hyperbilirubinemia   - July 2024 s/p ERCP with biliary sphincterotomy where sludge and stones were removed, achieving complete clearance   - 1/31/25 was planned for cholecystectomy with Dr. Dove but no show on day of surgery and again 2/13 and 2/27   - blood cx x2 5/8 NGTD   - tbili baseline ~7-9, (5/6) 10.6 --> (5/8) 18.2 --> (5/9) 26.1 --> (5/10) 22.3 --> (5/12) 14.5   - RUQ US showing cholelithiasis with no cholecystitis as well as hepatomegaly and hepatic steatosis        # Hx Priapism  - no active issues on admit   - hx previously drained by urology   - c/w home sudafed PRN     # Nodular lymphocyte predominant Hodgkins lymphoma (NLPHL)    - Follows with Dr. Stoll  - s/p Rituxan and prednisone q3 weeks (C1 1/18/24, C2 2/8/24, Missed C3 d/t sickle cell pain crisis, C4 4/4/24, C5 5/16/24, C6 6/7/24)   - 3/13 No show to onc appt   - 4/9 PET showed interval development of new FDG focus in mid abdomen  - will need onc appt scheduled prior to discharge      # Hx of nocturnal oxygen dependence  - baseline 2-3 L overnight  - Pulm outpatient appt scheduled back in February  - Will need pulm outpatient scheduling     # prophy  - ASA BID   - PT/OT (5/12)   - SCDs, encouraged ambulation      # dispo  - Code Status: Full Code (confirmed on admission)   - DC home resumed O2 pending infectious workup  - NOK: Melissa, mother, [phone]   - FUV onc 5/27, pulm 5/21, 7/9 sickle cell clinic      I spent 60 minutes in the professional and overall care of this patient.    Assessment and plan as above discussed with attending physician Dr. Bren Arana, APRN-CNP       [1] alteplase, 1 mg, intra-catheter, Once  aspirin, 81 mg, oral, BID  cyclobenzaprine, 10 mg, oral, TID  daptomycin, 6 mg/kg, intravenous, q24h  ertapenem, 1 g, intravenous, q24h  folic acid, 1 mg, oral, Daily  HYDROmorphone, 1 mg, intravenous, Once  lidocaine, 5 mL, infiltration, Once  lidocaine, 2 patch, transdermal, Daily  polyethylene  glycol, 17 g, oral, Daily     [2]    [3] PRN medications: alteplase, diphenhydrAMINE, HYDROmorphone, hydrOXYzine HCL, ondansetron, pseudoephedrine, sennosides-docusate sodium, sodium chloride

## 2025-05-13 NOTE — PROGRESS NOTES
Occupational Therapy    Evaluation    Patient Name: Joellen Narayan  MRN: [id number]  Department: [facility]  Room: [address] [place] [address]  Today's Date: 5/13/2025  Time Calculation  Start Time: 0911  Stop Time: 0920  Time Calculation (min): 9 min        Assessment:  OT Assessment: Pt presents to OT with severe pain to RUE limiting insight into higher level tasks however able to perform basic ADLs and declines to further participate in OT services.  May benefit from outpatient OT services upon discharge, pending improved pain control.  Prognosis: Good  Barriers to Discharge Home: No anticipated barriers  Evaluation/Treatment Tolerance: Patient limited by pain  Medical Staff Made Aware: Yes  End of Session Communication: Bedside nurse  End of Session Patient Position: Bed, 3 rail up, Alarm off, not on at start of session  OT Assessment Results: Decreased upper extremity range of motion, Decreased upper extremity strength, Decreased IADLs  Prognosis: Good  Evaluation/Treatment Tolerance: Patient limited by pain  Medical Staff Made Aware: Yes  Plan:  No Skilled OT: Safe to return home  OT Frequency: OT eval only  OT Discharge Recommendations: Low intensity level of continued care (outpatient OT)  OT Recommended Transfer Status: Independent  OT - OK to Discharge: Yes       Subjective   Current Problem:  1. Sickle cell pain crisis (Multi)  lidocaine 4 % patch    Referral to Pulmonology    CANCELED: Referral To Hematology and Oncology      2. Sickle cell anemia with pain  Vascular US upper extremity venous duplex right    Vascular US upper extremity venous duplex right      3. Sickle cell disease with crisis and other complication  Transthoracic Echo Complete    Transthoracic Echo Complete      4. Pain in right arm  Vascular US upper extremity venous duplex right      5. Hodgkin's paragranuloma (Multi)  Referral To Hematology and Oncology    CANCELED: Referral To Hematology and Oncology      6. Nocturnal hypoxia  Referral to Pulmonology       7. Septic bursitis of elbow, right  Case Request Operating Room: DEBRIDEMENT, ELBOW    Case Request Operating Room: DEBRIDEMENT, ELBOW    Surgical Pathology Exam    Surgical Pathology Exam    Fungal Culture/Smear    Fungal Culture/Smear    Fungal Culture/Smear    Fungal Culture/Smear    Tissue/Wound Culture/Smear    Tissue/Wound Culture/Smear    Tissue/Wound Culture/Smear    Tissue/Wound Culture/Smear    Fungal Culture/Smear    Fungal Culture/Smear    Fungal Culture/Smear    Fungal Culture/Smear    Fungal Culture/Smear    Fungal Culture/Smear    Tissue/Wound Culture/Smear    Tissue/Wound Culture/Smear    Tissue/Wound Culture/Smear    Tissue/Wound Culture/Smear    Tissue/Wound Culture/Smear    Tissue/Wound Culture/Smear        General:  General  Reason for Referral: presented 5/4 to [facility] ED with pain in chest, abd, and R arm typical of his acute on chronic sickle cell pain; s/p R elbow I&D 5/10  Past Medical History Relevant to Rehab: nodular lymphocyte predominant Hodgkins lymphoma (NLPHL) (s/p rituxan/prednisone, not on current active chemo), 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, mild splenic sequestration in 2001, priapism, acute chest syndrome, and nocturnal hypoxia (baseline 2-3L at night)  Prior to Session Communication: Bedside nurse  Patient Position Received: Bed, 3 rail up, Alarm off, not on at start of session  General Comment: met in bed, avoiding eye contact, expressing frustration over poor pain control, minimally cooperative  Precautions:  UE Weight Bearing Status: Weight Bearing as Tolerated  Medical Precautions: Fall precautions     Date/Time Vitals Session Patient Position Pulse Resp SpO2 BP MAP (mmHg)    05/13/25 0844 --  --  82  16  91 %  124/76  92     05/13/25 0937 --  --  --  --  93 %  --  --                 Pain:  Pain Assessment  Pain Assessment: 0-10  0-10 (Numeric) Pain Score: 10 - Worst possible pain  Pain Type: Surgical pain  Pain Location: Elbow  Pain Orientation:  "Right  Pain Descriptors: Throbbing  Pain Frequency: Constant/continuous  Clinical Progression: Not changed  Pain Interventions: Elevated, Repositioned (RN notified)  Response to Interventions: No change in pain    Objective   Cognition:  Overall Cognitive Status: Within Functional Limits  Arousal/Alertness: Appropriate responses to stimuli  Orientation Level: Oriented X4  Following Commands: Follows all commands and directions without difficulty  Cognition Comments: blunted, dismissive affect  Insight: Mild (poor insight into future obstacles and dismissive towards education)           Home Living:  Type of Home: House  Lives With: Parent(s) (mom)  Home Adaptive Equipment: None  Home Layout: One level  Home Access: Stairs to enter with rails  Entrance Stairs-Rails: Right  Entrance Stairs-Number of Steps: 7  Bathroom Shower/Tub: Walk-in shower  Bathroom Equipment: Grab bars in shower  Prior Function:  Level of [place]: Independent with ADLs and functional transfers, Independent with homemaking with ambulation  Receives Help From:  (\"I won't need any help\")  ADL Assistance: Independent  Homemaking Assistance: Independent  Ambulatory Assistance: Independent  Vocational: Full time employment  Hand Dominance: Right  Prior Function Comments: -falls  IADL History:  Current License: Yes  Mode of Transportation: Car  Occupation: Full time employment  Type of Occupation: security  ADL:  Eating Assistance: Modified independent (Device)  Eating Deficit: Setup  Grooming Assistance: Modified independent (Device)  Grooming Deficit: Setup  Bathing Assistance: Moderate (anticipated given extremely limited ROM to RUE)  UE Dressing Assistance: Modified independent (Device)  UE Dressing Deficit: Setup, Thread RUE, Thread LUE, Pull over head, Pull around back, Verbal cueing (EOB, education provided on dressing techniques)  LE Dressing Assistance: Stand by  LE Dressing Deficit: Don/doff R sock, Don/doff L sock, Thread RLE into " pants, Thread LLE into pants, Pull up over hips  Toileting Assistance with Device:  (per pt, able to perform posterior hygiene this AM; unable to demo at this time)  Activity Tolerance:  Endurance: Decreased tolerance for upright activites (2/2 pain)  Bed Mobility/Transfers: Bed Mobility  Bed Mobility: Yes  Bed Mobility 1  Bed Mobility 1: Supine to sitting, Sitting to supine  Level of Assistance 1: Modified independent  Bed Mobility Comments 1: HOB raised    Transfers  Transfer: Yes  Transfer 1  Technique 1: Sit to stand, Stand to sit  Transfer Level of Assistance 1: Modified independent  Trials/Comments 1: EOB      Functional Mobility:  Functional Mobility  Functional Mobility Performed: No      Vision:Vision - Basic Assessment  Current Vision: No visual deficits  Sensation:  Light Touch:  (denies numbness/tingling)  Strength:  Strength Comments: LUE WFL; RUE NT 2/2 pain  Perception:  Inattention/Neglect: Appears intact  Initiation: Appears intact  Motor Planning: Appears intact  Perseveration: Not present  Coordination:  Movements are Fluid and Coordinated: No  Upper Body Coordination: RUE held in elbow extension 2/2 pain   Hand Function:  Gross Grasp: Functional  Coordination: Functional  Extremities: RUE   RUE :  (pt unwilling to demo R shoulder ROM at this time d/t pain at elbow, elbow held in ext, distally WFL; able to perform BADLs) and LUE   LUE: Within Functional Limits      Outcome Measures:[facility] Daily Activity  Putting on and taking off regular lower body clothing: None  Bathing (including washing, rinsing, drying): A lot  Putting on and taking off regular upper body clothing: None  Toileting, which includes using toilet, bedpan or urinal: None  Taking care of personal grooming such as brushing teeth: A little  Eating Meals: A little  Daily Activity - Total Score: 20         and Brief Confusion Assessment Method (bCAM)  CAM Result: CAM -    Education Documentation  Body Mechanics, taught by Alondra  PERLA He at 5/13/2025  9:28 AM.  Learner: Patient  Readiness: Acceptance  Method: Explanation  Response: Verbalizes Understanding    Precautions, taught by Alondra He OT at 5/13/2025  9:28 AM.  Learner: Patient  Readiness: Acceptance  Method: Explanation  Response: Verbalizes Understanding    ADL Training, taught by Alondra He OT at 5/13/2025  9:28 AM.  Learner: Patient  Readiness: Acceptance  Method: Explanation  Response: Verbalizes Understanding    Education Comments  No comments found.      05/13/25 at 9:41 AM   Alondra He OT   Rehab Office: [phone]

## 2025-05-13 NOTE — CARE PLAN
The patient's goals for the shift include      The clinical goals for the shift include Pt will remain HDS and safe and free from injury through shift 5/13/25 @ 1900      Problem: Pain - Adult  Goal: Verbalizes/displays adequate comfort level or baseline comfort level  Outcome: Progressing     Problem: Safety - Adult  Goal: Free from fall injury  Outcome: Progressing     Problem: Discharge Planning  Goal: Discharge to home or other facility with appropriate resources  Outcome: Progressing     Problem: Chronic Conditions and Co-morbidities  Goal: Patient's chronic conditions and co-morbidity symptoms are monitored and maintained or improved  Outcome: Progressing     Problem: Nutrition  Goal: Nutrient intake appropriate for maintaining nutritional needs  Outcome: Progressing     Problem: Fall/Injury  Goal: Not fall by end of shift  Outcome: Progressing  Goal: Be free from injury by end of the shift  Outcome: Progressing  Goal: Verbalize understanding of personal risk factors for fall in the hospital  Outcome: Progressing  Goal: Verbalize understanding of risk factor reduction measures to prevent injury from fall in the home  Outcome: Progressing     Problem: Pain  Goal: Takes deep breaths with improved pain control throughout the shift  Outcome: Progressing  Goal: Turns in bed with improved pain control throughout the shift  Outcome: Progressing  Goal: Walks with improved pain control throughout the shift  Outcome: Progressing  Goal: Performs ADL's with improved pain control throughout shift  Outcome: Progressing  Goal: Participates in PT with improved pain control throughout the shift  Outcome: Progressing  Goal: Free from opioid side effects throughout the shift  Outcome: Progressing  Goal: Free from acute confusion related to pain meds throughout the shift  Outcome: Progressing

## 2025-05-14 LAB
ALBUMIN SERPL BCP-MCNC: 4.4 G/DL (ref 3.4–5)
ALP SERPL-CCNC: 212 U/L (ref 33–120)
ALT SERPL W P-5'-P-CCNC: 45 U/L (ref 10–52)
ANION GAP SERPL CALC-SCNC: 19 MMOL/L (ref 10–20)
AST SERPL W P-5'-P-CCNC: 88 U/L (ref 9–39)
BASOPHILS # BLD AUTO: 0.08 X10*3/UL (ref 0–0.1)
BASOPHILS NFR BLD AUTO: 0.7 %
BILIRUB SERPL-MCNC: 11.7 MG/DL (ref 0–1.2)
BUN SERPL-MCNC: 7 MG/DL (ref 6–23)
CALCIUM SERPL-MCNC: 9.7 MG/DL (ref 8.6–10.6)
CHLORIDE SERPL-SCNC: 101 MMOL/L (ref 98–107)
CO2 SERPL-SCNC: 20 MMOL/L (ref 21–32)
CREAT SERPL-MCNC: 0.49 MG/DL (ref 0.5–1.3)
CRP SERPL-MCNC: 10.56 MG/DL
EGFRCR SERPLBLD CKD-EPI 2021: >90 ML/MIN/1.73M*2
EOSINOPHIL # BLD AUTO: 0.85 X10*3/UL (ref 0–0.7)
EOSINOPHIL NFR BLD AUTO: 7.3 %
ERYTHROCYTE [DISTWIDTH] IN BLOOD BY AUTOMATED COUNT: 22.1 % (ref 11.5–14.5)
ERYTHROCYTE [SEDIMENTATION RATE] IN BLOOD BY WESTERGREN METHOD: 24 MM/H (ref 0–15)
GLUCOSE SERPL-MCNC: 60 MG/DL (ref 74–99)
HCT VFR BLD AUTO: 27.6 % (ref 41–52)
HGB BLD-MCNC: 9.1 G/DL (ref 13.5–17.5)
HGB RETIC QN: 29 PG (ref 28–38)
IMM GRANULOCYTES # BLD AUTO: 0.19 X10*3/UL (ref 0–0.7)
IMM GRANULOCYTES NFR BLD AUTO: 1.6 % (ref 0–0.9)
IMMATURE RETIC FRACTION: 22.8 %
LDH SERPL L TO P-CCNC: 763 U/L (ref 84–246)
LYMPHOCYTES # BLD AUTO: 2.55 X10*3/UL (ref 1.2–4.8)
LYMPHOCYTES NFR BLD AUTO: 21.8 %
MCH RBC QN AUTO: 28.8 PG (ref 26–34)
MCHC RBC AUTO-ENTMCNC: 33 G/DL (ref 32–36)
MCV RBC AUTO: 87 FL (ref 80–100)
MONOCYTES # BLD AUTO: 1.61 X10*3/UL (ref 0.1–1)
MONOCYTES NFR BLD AUTO: 13.8 %
NEUTROPHILS # BLD AUTO: 6.4 X10*3/UL (ref 1.2–7.7)
NEUTROPHILS NFR BLD AUTO: 54.8 %
NRBC BLD-RTO: 2.4 /100 WBCS (ref 0–0)
PLATELET # BLD AUTO: 518 X10*3/UL (ref 150–450)
POTASSIUM SERPL-SCNC: 4.7 MMOL/L (ref 3.5–5.3)
PROT SERPL-MCNC: 8 G/DL (ref 6.4–8.2)
RBC # BLD AUTO: 3.16 X10*6/UL (ref 4.5–5.9)
RETICS #: 0.5 X10*6/UL (ref 0.02–0.12)
RETICS/RBC NFR AUTO: 15.9 % (ref 0.5–2)
SODIUM SERPL-SCNC: 135 MMOL/L (ref 136–145)
WBC # BLD AUTO: 11.7 X10*3/UL (ref 4.4–11.3)

## 2025-05-14 PROCEDURE — 2500000004 HC RX 250 GENERAL PHARMACY W/ HCPCS (ALT 636 FOR OP/ED): Mod: JZ,TB

## 2025-05-14 PROCEDURE — 85045 AUTOMATED RETICULOCYTE COUNT: CPT

## 2025-05-14 PROCEDURE — 80053 COMPREHEN METABOLIC PANEL: CPT

## 2025-05-14 PROCEDURE — 99232 SBSQ HOSP IP/OBS MODERATE 35: CPT

## 2025-05-14 PROCEDURE — 85652 RBC SED RATE AUTOMATED: CPT

## 2025-05-14 PROCEDURE — 1170000001 HC PRIVATE ONCOLOGY ROOM DAILY

## 2025-05-14 PROCEDURE — 85025 COMPLETE CBC W/AUTO DIFF WBC: CPT

## 2025-05-14 PROCEDURE — 2500000001 HC RX 250 WO HCPCS SELF ADMINISTERED DRUGS (ALT 637 FOR MEDICARE OP)

## 2025-05-14 PROCEDURE — 2500000004 HC RX 250 GENERAL PHARMACY W/ HCPCS (ALT 636 FOR OP/ED)

## 2025-05-14 PROCEDURE — 83615 LACTATE (LD) (LDH) ENZYME: CPT

## 2025-05-14 RX ADMIN — ASPIRIN 81 MG: 81 TABLET, CHEWABLE ORAL at 07:50

## 2025-05-14 RX ADMIN — ASPIRIN 81 MG: 81 TABLET, CHEWABLE ORAL at 20:50

## 2025-05-14 RX ADMIN — HYDROMORPHONE HYDROCHLORIDE 4 MG: 2 INJECTION, SOLUTION INTRAMUSCULAR; INTRAVENOUS; SUBCUTANEOUS at 03:32

## 2025-05-14 RX ADMIN — HYDROMORPHONE HYDROCHLORIDE 4 MG: 2 INJECTION, SOLUTION INTRAMUSCULAR; INTRAVENOUS; SUBCUTANEOUS at 09:40

## 2025-05-14 RX ADMIN — HYDROMORPHONE HYDROCHLORIDE 4 MG: 2 INJECTION, SOLUTION INTRAMUSCULAR; INTRAVENOUS; SUBCUTANEOUS at 07:50

## 2025-05-14 RX ADMIN — CYCLOBENZAPRINE 10 MG: 10 TABLET, FILM COATED ORAL at 14:03

## 2025-05-14 RX ADMIN — FOLIC ACID 1 MG: 1 TABLET ORAL at 07:50

## 2025-05-14 RX ADMIN — CYCLOBENZAPRINE 10 MG: 10 TABLET, FILM COATED ORAL at 07:50

## 2025-05-14 RX ADMIN — HYDROMORPHONE HYDROCHLORIDE 4 MG: 2 INJECTION, SOLUTION INTRAMUSCULAR; INTRAVENOUS; SUBCUTANEOUS at 01:32

## 2025-05-14 RX ADMIN — CYCLOBENZAPRINE 10 MG: 10 TABLET, FILM COATED ORAL at 20:50

## 2025-05-14 RX ADMIN — DAPTOMYCIN 450 MG: 500 INJECTION, POWDER, LYOPHILIZED, FOR SOLUTION INTRAVENOUS at 20:50

## 2025-05-14 RX ADMIN — HYDROMORPHONE HYDROCHLORIDE 4 MG: 2 INJECTION, SOLUTION INTRAMUSCULAR; INTRAVENOUS; SUBCUTANEOUS at 14:03

## 2025-05-14 RX ADMIN — HYDROMORPHONE HYDROCHLORIDE 4 MG: 2 INJECTION, SOLUTION INTRAMUSCULAR; INTRAVENOUS; SUBCUTANEOUS at 05:46

## 2025-05-14 RX ADMIN — DIPHENHYDRAMINE HCL 25 MG: 25 CAPSULE ORAL at 23:39

## 2025-05-14 RX ADMIN — HYDROMORPHONE HYDROCHLORIDE 6 MG: 2 INJECTION, SOLUTION INTRAMUSCULAR; INTRAVENOUS; SUBCUTANEOUS at 16:17

## 2025-05-14 RX ADMIN — HYDROMORPHONE HYDROCHLORIDE 4 MG: 2 INJECTION, SOLUTION INTRAMUSCULAR; INTRAVENOUS; SUBCUTANEOUS at 11:46

## 2025-05-14 RX ADMIN — POLYETHYLENE GLYCOL 3350 17 G: 17 POWDER, FOR SOLUTION ORAL at 07:50

## 2025-05-14 RX ADMIN — HYDROMORPHONE HYDROCHLORIDE 6 MG: 2 INJECTION, SOLUTION INTRAMUSCULAR; INTRAVENOUS; SUBCUTANEOUS at 18:54

## 2025-05-14 RX ADMIN — HYDROMORPHONE HYDROCHLORIDE 6 MG: 2 INJECTION, SOLUTION INTRAMUSCULAR; INTRAVENOUS; SUBCUTANEOUS at 23:08

## 2025-05-14 RX ADMIN — HYDROMORPHONE HYDROCHLORIDE 6 MG: 2 INJECTION, SOLUTION INTRAMUSCULAR; INTRAVENOUS; SUBCUTANEOUS at 20:51

## 2025-05-14 RX ADMIN — ERTAPENEM SODIUM 1 G: 1 INJECTION, POWDER, LYOPHILIZED, FOR SOLUTION INTRAMUSCULAR; INTRAVENOUS at 21:33

## 2025-05-14 ASSESSMENT — PAIN - FUNCTIONAL ASSESSMENT
PAIN_FUNCTIONAL_ASSESSMENT: 0-10
PAIN_FUNCTIONAL_ASSESSMENT: UNABLE TO SELF-REPORT

## 2025-05-14 ASSESSMENT — COGNITIVE AND FUNCTIONAL STATUS - GENERAL
DAILY ACTIVITIY SCORE: 23
MOBILITY SCORE: 24
MOBILITY SCORE: 24
DAILY ACTIVITIY SCORE: 24
DRESSING REGULAR UPPER BODY CLOTHING: A LITTLE

## 2025-05-14 ASSESSMENT — PAIN SCALES - GENERAL
PAINLEVEL_OUTOF10: 9
PAINLEVEL_OUTOF10: 8
PAINLEVEL_OUTOF10: 9
PAINLEVEL_OUTOF10: 9
PAINLEVEL_OUTOF10: 8
PAINLEVEL_OUTOF10: 8
PAINLEVEL_OUTOF10: 9
PAINLEVEL_OUTOF10: 8
PAINLEVEL_OUTOF10: 9
PAINLEVEL_OUTOF10: 8
PAINLEVEL_OUTOF10: 8
PAINLEVEL_OUTOF10: 10 - WORST POSSIBLE PAIN
PAINLEVEL_OUTOF10: 9

## 2025-05-14 ASSESSMENT — PAIN DESCRIPTION - ORIENTATION: ORIENTATION: RIGHT

## 2025-05-14 ASSESSMENT — PAIN DESCRIPTION - LOCATION: LOCATION: ARM

## 2025-05-14 NOTE — CARE PLAN
The patient's goals for the shift include      The clinical goals for the shift include Pt will remain HDS and safe and free from injury through shift 5/14/25 2 [id number]      Problem: Pain - Adult  Goal: Verbalizes/displays adequate comfort level or baseline comfort level  Outcome: Progressing     Problem: Safety - Adult  Goal: Free from fall injury  Outcome: Progressing     Problem: Discharge Planning  Goal: Discharge to home or other facility with appropriate resources  Outcome: Progressing     Problem: Chronic Conditions and Co-morbidities  Goal: Patient's chronic conditions and co-morbidity symptoms are monitored and maintained or improved  Outcome: Progressing     Problem: Nutrition  Goal: Nutrient intake appropriate for maintaining nutritional needs  Outcome: Progressing     Problem: Fall/Injury  Goal: Not fall by end of shift  Outcome: Progressing  Goal: Be free from injury by end of the shift  Outcome: Progressing  Goal: Verbalize understanding of personal risk factors for fall in the hospital  Outcome: Progressing  Goal: Verbalize understanding of risk factor reduction measures to prevent injury from fall in the home  Outcome: Progressing     Problem: Pain  Goal: Takes deep breaths with improved pain control throughout the shift  Outcome: Progressing  Goal: Turns in bed with improved pain control throughout the shift  Outcome: Progressing  Goal: Walks with improved pain control throughout the shift  Outcome: Progressing  Goal: Performs ADL's with improved pain control throughout shift  Outcome: Progressing  Goal: Participates in PT with improved pain control throughout the shift  Outcome: Progressing  Goal: Free from opioid side effects throughout the shift  Outcome: Progressing  Goal: Free from acute confusion related to pain meds throughout the shift  Outcome: Progressing

## 2025-05-14 NOTE — PROGRESS NOTES
"Joellen Narayan is a 24 y.o. male on day 11 of admission presenting with Sickle cell pain crisis (Multi).    Subjective   Patient resting in bed, states pain in arm is acutely worse due to bumping it on side of bed, pain 11/10. Denies numbness, tingling in arm. Denies pain elsewhere, no chest pain, shortness of breath, n/v/c/d, headache, dizziness, lightheadedness, abdominal pain, swelling. Discussed pain medication today, patient states does not feel ready to rotate pain medications today due to acute worsening pain in arm, will check in this afternoon to rotate later this evening or tomorrow. Patient in agreement.       Objective     Physical Exam  Constitutional:       Appearance: Normal appearance.   HENT:      Head: Normocephalic.      Mouth/Throat:      Mouth: Mucous membranes are moist.   Eyes:      General: Scleral icterus present.      Extraocular Movements: Extraocular movements intact.      Pupils: Pupils are equal, round, and reactive to light.   Cardiovascular:      Rate and Rhythm: Normal rate and regular rhythm.      Pulses: Normal pulses.      Heart sounds: Normal heart sounds.   Pulmonary:      Effort: Pulmonary effort is normal.      Breath sounds: Normal breath sounds.   Abdominal:      General: Abdomen is flat. Bowel sounds are normal.      Palpations: Abdomen is soft.   Musculoskeletal:      Right elbow: Decreased range of motion. Tenderness present.      Comments: Right arm wrapped, extremity temperature normal   Skin:     General: Skin is warm.   Neurological:      General: No focal deficit present.      Mental Status: He is alert and oriented to person, place, and time.   Psychiatric:         Mood and Affect: Mood normal.         Behavior: Behavior normal.         Last Recorded Vitals  Blood pressure 107/64, pulse 90, temperature 36.7 °C (98.1 °F), temperature source Temporal, resp. rate 18, height 1.88 m (6' 2\"), weight 72.6 kg (160 lb), SpO2 96%.  Intake/Output last 3 Shifts:  I/O last 3 " completed shifts:  In: 109 (1.5 mL/kg) [IV Piggyback:109]  Out: 6550 (90.3 mL/kg) [Urine:6550 (2.5 mL/kg/hr)]  Weight: 72.6 kg     Relevant Results          Scheduled medications  Scheduled Medications[1]  Continuous medications  Continuous Medications[2]  PRN medications  PRN Medications[3]  Results for orders placed or performed during the hospital encounter of 05/03/25 (from the past 24 hours)   CBC and Auto Differential   Result Value Ref Range    WBC 11.7 (H) 4.4 - 11.3 x10*3/uL    nRBC 2.4 (H) 0.0 - 0.0 /100 WBCs    RBC 3.16 (L) 4.50 - 5.90 x10*6/uL    Hemoglobin 9.1 (L) 13.5 - 17.5 g/dL    Hematocrit 27.6 (L) 41.0 - 52.0 %    MCV 87 80 - 100 fL    MCH 28.8 26.0 - 34.0 pg    MCHC 33.0 32.0 - 36.0 g/dL    RDW 22.1 (H) 11.5 - 14.5 %    Platelets 518 (H) 150 - 450 x10*3/uL    Neutrophils % 54.8 40.0 - 80.0 %    Immature Granulocytes %, Automated 1.6 (H) 0.0 - 0.9 %    Lymphocytes % 21.8 13.0 - 44.0 %    Monocytes % 13.8 2.0 - 10.0 %    Eosinophils % 7.3 0.0 - 6.0 %    Basophils % 0.7 0.0 - 2.0 %    Neutrophils Absolute 6.40 1.20 - 7.70 x10*3/uL    Immature Granulocytes Absolute, Automated 0.19 0.00 - 0.70 x10*3/uL    Lymphocytes Absolute 2.55 1.20 - 4.80 x10*3/uL    Monocytes Absolute 1.61 (H) 0.10 - 1.00 x10*3/uL    Eosinophils Absolute 0.85 (H) 0.00 - 0.70 x10*3/uL    Basophils Absolute 0.08 0.00 - 0.10 x10*3/uL   Comprehensive metabolic panel   Result Value Ref Range    Glucose 60 (L) 74 - 99 mg/dL    Sodium 135 (L) 136 - 145 mmol/L    Potassium 4.7 3.5 - 5.3 mmol/L    Chloride 101 98 - 107 mmol/L    Bicarbonate 20 (L) 21 - 32 mmol/L    Anion Gap 19 10 - 20 mmol/L    Urea Nitrogen 7 6 - 23 mg/dL    Creatinine 0.49 (L) 0.50 - 1.30 mg/dL    eGFR >90 >60 mL/min/1.73m*2    Calcium 9.7 8.6 - 10.6 mg/dL    Albumin 4.4 3.4 - 5.0 g/dL    Alkaline Phosphatase 212 (H) 33 - 120 U/L    Total Protein 8.0 6.4 - 8.2 g/dL    AST 88 (H) 9 - 39 U/L    Bilirubin, Total 11.7 (H) 0.0 - 1.2 mg/dL    ALT 45 10 - 52 U/L   Lactate  dehydrogenase   Result Value Ref Range     (H) 84 - 246 U/L   Reticulocytes   Result Value Ref Range    Retic % 15.9 (H) 0.5 - 2.0 %    Retic Absolute 0.501 (H) 0.022 - 0.118 x10*6/uL    Reticulocyte Hemoglobin 29 28 - 38 pg    Immature Retic fraction 22.8 (H) <=16.0 %     *Note: Due to a large number of results and/or encounters for the requested time period, some results have not been displayed. A complete set of results can be found in Results Review.     No results found.              Assessment & Plan  Sickle cell pain crisis (Multi)    Septic bursitis of elbow, right    Raundre Sylvain 24 y.o. 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 Patient initially 98% RA, however became hypoxic in ED requiring 2-4 liters, unclear etiology for hypoxia at this time, possibly related to poor inspiratory effort in setting of severe pain vs ACS vs infectious process. Multiple CXR w/o evidence of acute process. 5/3 CT PE negative, similar GGOs from prior scans unchanged. Pt asymptomatic, denies respiratory s/s. On 5/5, pt reported worsening R arm pain. XR w joint effusion (5/7), CRP 16 (5/8), MRI R humerus/ radius (5/9) concerning for septic arthritis vs osteomyelitis elbow joint effusion. Ortho consulted, s/p R elbow I&D 5/10. Cultures pending (NGTD 5/12). S/p zosyn (5/8-5/12) and Vanc (5/10-12). ID consulted, recommending IV daptomycin and ertapenem until 6/20/25 (started on 5/12). Ortho removed drain on 5/12. Discharge pending improvement in pain and PICC line placement for homegoing antibiotics.       Updates 5/14:   - Has midline in place, PICC ordered for  homegoing antibiotics   - Acute worsening pain d/t accidentally bumping elbow on bed this morning, increased dilaudid back to 6 mg IV q 2 hr for today  - CRP, ESR pending today for increased pain     # VERÓNICA pain and edema   # osteomyelitis vs component of septic arthritis   - original etiology sickle cell related vs AVN vs osteomyelitis vs thrombus vs septic arthritis    - no imaging obtained on admit, pt states acute pain feels like sickle cell pain 5/6   - Jan 2025 there was ? hx of OM of lower extremities vs chronic SCD changes but no surgical interventions required at time therefore low threshold for MRI during this admission  - RUE US neg for DVT (5/5)   - ESR/ CRP <1/5.16 (5/4) --> <1/7.85 (5/5) --> <1, 6.15 (5/6) --> CRP 16.01 (5/8); repeat pending 5/14 for increased pain  - 5/5 offered MRI R arm pt declining at this time even when offered with premedication  - attempted (5/7) MRI R arm (pt did not tolerate exam)  - 5/7 XR R arm showing large elbow joint effusion   - 5/8 pt became febrile x3, highest 38.8 s/p tylenol 650mg once   - started Zosyn (5/8- )/Vanc (5/10- ) and IVF x 48hr  - 5/8 Ortho consulted, s/p aspirate (>44k cells)  - 5/9 MRI w/anesthesia R radius/humerus showing concern for septic arthritis, osteomyelitis, myositis, severe muscle and subcutaneous soft tissue edema, and possible cellulitis  - s/p R elbow I&D 5/10 with ortho   - Tissue/ wound cultures NGTD, Fungal Cultures pending (NGTD 5/12)  - ID consulted 5/10; following cultures; s/p Zosyn (5/8-5/12)/Vanc (5/10-5/12)   - 5/12 final Ortho recs; WBAT RUE, Mepilex remove POD7 (5/17/25), drain removed 5/12, require 6 weeks total of DVT prophylaxis from an orthopedic standpoint, Calcium/Vitamin D 500mg-400IU BID for 6 weeks, follow up w/ Dr. Tello 2 weeks after surgery for post-operative appointment (scheduled 6/9)  - 5/13, final ID recs for long term antibiotics; IV daptomycin 6mg/kg q24h and ertapenem 1g q24h until 6/20/25  - CK 45 (for  baseline per ID)   - PICC ordered for homegoing antibiotics, pending placement as of 5/14  - Patient agreeable to outpatient OT/PT, will send script on discharge   - see below for pain regimen    # Hgb SS disease   - OARRS reviewed on admission, no aberrancies noted   - Hgb baseline: 9-10, 8.1 on admission, (5/5) Hg 7.5, in setting of worsening hemolysis and pain, s/p 1u pRBC --> Hgb 7.6 (5/6) --> Hgb 8.6 (5/7)  - 5/10 hgb dropped to 5.9 -received 2u PRBC  - 5/11 hgb 6.8 s/p 1 unit pRBCs --> hgb 7.5 (5/12) ->9.1 (5/14)  -  (5/12); -400; 763 (5/14)  - Bili 26.9 (5/9) --> 14.5 (5/12); CTM   - Leukocytosis most likely I/s/o OM as above; improved since admission   - Admission, s/p initiating 4mg dilaudid q2h prn, IV toradol 15mg q6h x 3 days, lidocaine patch, flexeril as needed, tylenol   - s/p increased to 5.5mg IVP dilaudid q2 PRN (5/5), offered dPCA, pt declining at this time as well   - s/p 6mg IVP dilaudid q2h prn for severe pain (5/6-5/13)  - decreased dilaudid to 4mg IVP Q2hrs (5/13-5/14); originally wanted to try rotating on 5/14; patient with increased pain on 5/14, increased back to 6 mg q2 hrs for severe pain (5/14-)  - c/w home folic acid daily  - utox pending   - Exchange labs resulted 5/2-5/5, repeat ordered for 5/9   - 5/4 Hg S 92.8%  - bowel regimen for opioid induced constipation, zofran for opioid induced nausea, and benadrly for opioid induced pruritis       # AHRF, requiring 2-4L on admission - resolved   # Hx of nocturnal oxygen dependence  - On 2-3L at night, however patient reports daytime hypoxia as well in the past, currently on 2L 5/11  - unclear at this time, etiology includes acute chest vs poor inspiratory effort in setting of pain vs pneumonia vs PE vs pulm htn  - 5/3 Cxr w/o evidence of acute process   - 5/3 CT PE w/o evidence of PE, similar GGOs from prior likely atelectasis (similar to Dec 2024)   - strep pneum, legionella negative 5/4   - 5/5 ECHO EF 63%, LV severely  dilated   - 5/5 repeat CXR neg for acute process, 5/8 CXR w/o evidence acute process   - Flu A/B, covid neg (5/5), respx cx 5/4 negative   - On room air as of 5/12 (nocturnal O2 at night), back to baseline   - Pulmonology outpatient appointment 5/21 with Dr. Deng     # hx choledocholithiasis 7/2024 -improved   - worsening hemolysis in setting of active infection could be contributing to increased hyperbilirubinemia   - July 2024 s/p ERCP with biliary sphincterotomy where sludge and stones were removed, achieving complete clearance   - 1/31/25 was planned for cholecystectomy with Dr. Dove but no show on day of surgery and again 2/13 and 2/27   - blood cx x2 5/8 NGTD   - tbili baseline ~7-9, (5/6) 10.6 --> (5/8) 18.2 --> (5/9) 26.1 --> (5/10) 22.3 --> (5/12) 14.5   - RUQ US showing cholelithiasis with no cholecystitis as well as hepatomegaly and hepatic steatosis        # Hx Priapism  - no active issues on admit   - hx previously drained by urology   - c/w home sudafed PRN     # Nodular lymphocyte predominant Hodgkins lymphoma (NLPHL)    - Follows with Dr. Stoll  - s/p Rituxan and prednisone q3 weeks (C1 1/18/24, C2 2/8/24, Missed C3 d/t sickle cell pain crisis, C4 4/4/24, C5 5/16/24, C6 6/7/24)   - 3/13 No show to onc appt   - 4/9 PET showed interval development of new FDG focus in mid abdomen  - Outpatient appointment scheduled 5/27      # prophy  - ASA BID   - PT/OT (5/12)   - SCDs, encouraged ambulation      # dispo  - Code Status: Full Code (confirmed on admission)   - DC home resumed O2 pending infectious workup  - NOK: Melissa mother, [phone]   - FUV onc 5/27, pulm 5/21, ortho surg 6/9, ID 6/17 7/9 sickle cell clinic    Assessment and plan as above discussed with attending physician Dr. Correia.        I spent 60 minutes in the professional and overall care of this patient.      Lorri Belle, APRN-CNP         [1] alteplase, 1 mg, intra-catheter, Once  aspirin, 81 mg, oral, BID  cyclobenzaprine,  10 mg, oral, TID  daptomycin, 6 mg/kg, intravenous, q24h  ertapenem, 1 g, intravenous, q24h  folic acid, 1 mg, oral, Daily  lidocaine, 5 mL, infiltration, Once  lidocaine, 2 patch, transdermal, Daily  polyethylene glycol, 17 g, oral, Daily  [2]    [3] PRN medications: alteplase, diphenhydrAMINE, HYDROmorphone, hydrOXYzine HCL, ondansetron, pseudoephedrine, sennosides-docusate sodium, sodium chloride

## 2025-05-14 NOTE — CARE PLAN
The patient's goals for the shift include      The clinical goals for the shift include pt will remain HDS and VSS throughout shift end on 5/14/25 @0700      Problem: Pain - Adult  Goal: Verbalizes/displays adequate comfort level or baseline comfort level  Outcome: Progressing     Problem: Safety - Adult  Goal: Free from fall injury  Outcome: Progressing     Problem: Discharge Planning  Goal: Discharge to home or other facility with appropriate resources  Outcome: Progressing     Problem: Chronic Conditions and Co-morbidities  Goal: Patient's chronic conditions and co-morbidity symptoms are monitored and maintained or improved  Outcome: Progressing     Problem: Nutrition  Goal: Nutrient intake appropriate for maintaining nutritional needs  Outcome: Progressing     Problem: Fall/Injury  Goal: Not fall by end of shift  Outcome: Progressing  Goal: Be free from injury by end of the shift  Outcome: Progressing  Goal: Verbalize understanding of personal risk factors for fall in the hospital  Outcome: Progressing  Goal: Verbalize understanding of risk factor reduction measures to prevent injury from fall in the home  Outcome: Progressing     Problem: Pain  Goal: Takes deep breaths with improved pain control throughout the shift  Outcome: Progressing  Goal: Turns in bed with improved pain control throughout the shift  Outcome: Progressing  Goal: Walks with improved pain control throughout the shift  Outcome: Progressing  Goal: Performs ADL's with improved pain control throughout shift  Outcome: Progressing  Goal: Participates in PT with improved pain control throughout the shift  Outcome: Progressing  Goal: Free from opioid side effects throughout the shift  Outcome: Progressing  Goal: Free from acute confusion related to pain meds throughout the shift  Outcome: Progressing

## 2025-05-15 ENCOUNTER — APPOINTMENT (OUTPATIENT)
Dept: RADIOLOGY | Facility: HOSPITAL | Age: 25
DRG: 981 | End: 2025-05-15
Payer: COMMERCIAL

## 2025-05-15 LAB
ALBUMIN SERPL BCP-MCNC: 4.2 G/DL (ref 3.4–5)
ALP SERPL-CCNC: 221 U/L (ref 33–120)
ALT SERPL W P-5'-P-CCNC: 58 U/L (ref 10–52)
ANION GAP SERPL CALC-SCNC: 12 MMOL/L (ref 10–20)
AST SERPL W P-5'-P-CCNC: 80 U/L (ref 9–39)
BASOPHILS # BLD AUTO: 0.07 X10*3/UL (ref 0–0.1)
BASOPHILS NFR BLD AUTO: 0.6 %
BILIRUB SERPL-MCNC: 10.9 MG/DL (ref 0–1.2)
BUN SERPL-MCNC: 9 MG/DL (ref 6–23)
CALCIUM SERPL-MCNC: 10.2 MG/DL (ref 8.6–10.6)
CHLORIDE SERPL-SCNC: 97 MMOL/L (ref 98–107)
CO2 SERPL-SCNC: 29 MMOL/L (ref 21–32)
CREAT SERPL-MCNC: 0.53 MG/DL (ref 0.5–1.3)
EGFRCR SERPLBLD CKD-EPI 2021: >90 ML/MIN/1.73M*2
EOSINOPHIL # BLD AUTO: 0.99 X10*3/UL (ref 0–0.7)
EOSINOPHIL NFR BLD AUTO: 9 %
ERYTHROCYTE [DISTWIDTH] IN BLOOD BY AUTOMATED COUNT: 21.1 % (ref 11.5–14.5)
GLUCOSE SERPL-MCNC: 81 MG/DL (ref 74–99)
HCT VFR BLD AUTO: 27.6 % (ref 41–52)
HGB BLD-MCNC: 9.7 G/DL (ref 13.5–17.5)
HGB RETIC QN: 32 PG (ref 28–38)
IMM GRANULOCYTES # BLD AUTO: 0.2 X10*3/UL (ref 0–0.7)
IMM GRANULOCYTES NFR BLD AUTO: 1.8 % (ref 0–0.9)
IMMATURE RETIC FRACTION: 24.2 %
LDH SERPL L TO P-CCNC: 515 U/L (ref 84–246)
LYMPHOCYTES # BLD AUTO: 2.32 X10*3/UL (ref 1.2–4.8)
LYMPHOCYTES NFR BLD AUTO: 21 %
MCH RBC QN AUTO: 30.2 PG (ref 26–34)
MCHC RBC AUTO-ENTMCNC: 35.1 G/DL (ref 32–36)
MCV RBC AUTO: 86 FL (ref 80–100)
MONOCYTES # BLD AUTO: 1.52 X10*3/UL (ref 0.1–1)
MONOCYTES NFR BLD AUTO: 13.8 %
NEUTROPHILS # BLD AUTO: 5.94 X10*3/UL (ref 1.2–7.7)
NEUTROPHILS NFR BLD AUTO: 53.8 %
NRBC BLD-RTO: 2.3 /100 WBCS (ref 0–0)
PLATELET # BLD AUTO: 608 X10*3/UL (ref 150–450)
POTASSIUM SERPL-SCNC: 4.1 MMOL/L (ref 3.5–5.3)
PROT SERPL-MCNC: 7.6 G/DL (ref 6.4–8.2)
RBC # BLD AUTO: 3.21 X10*6/UL (ref 4.5–5.9)
RETICS #: 0.53 X10*6/UL (ref 0.02–0.12)
RETICS/RBC NFR AUTO: 16.6 % (ref 0.5–2)
SODIUM SERPL-SCNC: 134 MMOL/L (ref 136–145)
STAPHYLOCOCCUS SPEC CULT: NORMAL
WBC # BLD AUTO: 11 X10*3/UL (ref 4.4–11.3)

## 2025-05-15 PROCEDURE — 2500000004 HC RX 250 GENERAL PHARMACY W/ HCPCS (ALT 636 FOR OP/ED)

## 2025-05-15 PROCEDURE — 1170000001 HC PRIVATE ONCOLOGY ROOM DAILY

## 2025-05-15 PROCEDURE — 2500000001 HC RX 250 WO HCPCS SELF ADMINISTERED DRUGS (ALT 637 FOR MEDICARE OP)

## 2025-05-15 PROCEDURE — 85025 COMPLETE CBC W/AUTO DIFF WBC: CPT

## 2025-05-15 PROCEDURE — 80053 COMPREHEN METABOLIC PANEL: CPT

## 2025-05-15 PROCEDURE — 99233 SBSQ HOSP IP/OBS HIGH 50: CPT

## 2025-05-15 PROCEDURE — 83615 LACTATE (LD) (LDH) ENZYME: CPT

## 2025-05-15 PROCEDURE — 85045 AUTOMATED RETICULOCYTE COUNT: CPT

## 2025-05-15 PROCEDURE — 2500000004 HC RX 250 GENERAL PHARMACY W/ HCPCS (ALT 636 FOR OP/ED): Mod: JZ,TB

## 2025-05-15 PROCEDURE — 71045 X-RAY EXAM CHEST 1 VIEW: CPT

## 2025-05-15 PROCEDURE — 71045 X-RAY EXAM CHEST 1 VIEW: CPT | Performed by: RADIOLOGY

## 2025-05-15 RX ORDER — LACTULOSE 10 G/15ML
20 SOLUTION ORAL ONCE
Status: COMPLETED | OUTPATIENT
Start: 2025-05-15 | End: 2025-05-15

## 2025-05-15 RX ORDER — AMOXICILLIN 250 MG
2 CAPSULE ORAL NIGHTLY PRN
Status: DISCONTINUED | OUTPATIENT
Start: 2025-05-15 | End: 2025-05-16 | Stop reason: HOSPADM

## 2025-05-15 RX ADMIN — CYCLOBENZAPRINE 10 MG: 10 TABLET, FILM COATED ORAL at 15:29

## 2025-05-15 RX ADMIN — POLYETHYLENE GLYCOL 3350 17 G: 17 POWDER, FOR SOLUTION ORAL at 08:11

## 2025-05-15 RX ADMIN — HYDROMORPHONE HYDROCHLORIDE 6 MG: 2 INJECTION, SOLUTION INTRAMUSCULAR; INTRAVENOUS; SUBCUTANEOUS at 10:22

## 2025-05-15 RX ADMIN — DAPTOMYCIN 450 MG: 500 INJECTION, POWDER, LYOPHILIZED, FOR SOLUTION INTRAVENOUS at 21:45

## 2025-05-15 RX ADMIN — HYDROMORPHONE HYDROCHLORIDE 6 MG: 2 INJECTION, SOLUTION INTRAMUSCULAR; INTRAVENOUS; SUBCUTANEOUS at 05:49

## 2025-05-15 RX ADMIN — HYDROMORPHONE HYDROCHLORIDE 6 MG: 2 INJECTION, SOLUTION INTRAMUSCULAR; INTRAVENOUS; SUBCUTANEOUS at 03:32

## 2025-05-15 RX ADMIN — HYDROMORPHONE HYDROCHLORIDE 6 MG: 2 INJECTION, SOLUTION INTRAMUSCULAR; INTRAVENOUS; SUBCUTANEOUS at 01:33

## 2025-05-15 RX ADMIN — CYCLOBENZAPRINE 10 MG: 10 TABLET, FILM COATED ORAL at 08:11

## 2025-05-15 RX ADMIN — CYCLOBENZAPRINE 10 MG: 10 TABLET, FILM COATED ORAL at 20:22

## 2025-05-15 RX ADMIN — HYDROMORPHONE HYDROCHLORIDE 6 MG: 2 INJECTION, SOLUTION INTRAMUSCULAR; INTRAVENOUS; SUBCUTANEOUS at 18:02

## 2025-05-15 RX ADMIN — HYDROMORPHONE HYDROCHLORIDE 6 MG: 2 INJECTION, SOLUTION INTRAMUSCULAR; INTRAVENOUS; SUBCUTANEOUS at 12:24

## 2025-05-15 RX ADMIN — LACTULOSE 20 G: 20 SOLUTION ORAL at 20:30

## 2025-05-15 RX ADMIN — OXYCODONE HYDROCHLORIDE 15 MG: 15 TABLET, FILM COATED, EXTENDED RELEASE ORAL at 13:22

## 2025-05-15 RX ADMIN — FOLIC ACID 1 MG: 1 TABLET ORAL at 08:11

## 2025-05-15 RX ADMIN — ASPIRIN 81 MG: 81 TABLET, CHEWABLE ORAL at 20:21

## 2025-05-15 RX ADMIN — HYDROMORPHONE HYDROCHLORIDE 6 MG: 2 INJECTION, SOLUTION INTRAMUSCULAR; INTRAVENOUS; SUBCUTANEOUS at 20:21

## 2025-05-15 RX ADMIN — ASPIRIN 81 MG: 81 TABLET, CHEWABLE ORAL at 08:11

## 2025-05-15 RX ADMIN — OXYCODONE HYDROCHLORIDE 15 MG: 15 TABLET, FILM COATED, EXTENDED RELEASE ORAL at 20:22

## 2025-05-15 RX ADMIN — HYDROMORPHONE HYDROCHLORIDE 6 MG: 2 INJECTION, SOLUTION INTRAMUSCULAR; INTRAVENOUS; SUBCUTANEOUS at 15:29

## 2025-05-15 RX ADMIN — HYDROMORPHONE HYDROCHLORIDE 6 MG: 2 INJECTION, SOLUTION INTRAMUSCULAR; INTRAVENOUS; SUBCUTANEOUS at 08:11

## 2025-05-15 RX ADMIN — HYDROMORPHONE HYDROCHLORIDE 6 MG: 2 INJECTION, SOLUTION INTRAMUSCULAR; INTRAVENOUS; SUBCUTANEOUS at 22:25

## 2025-05-15 RX ADMIN — ERTAPENEM SODIUM 1 G: 1 INJECTION, POWDER, LYOPHILIZED, FOR SOLUTION INTRAMUSCULAR; INTRAVENOUS at 21:12

## 2025-05-15 ASSESSMENT — COGNITIVE AND FUNCTIONAL STATUS - GENERAL
DAILY ACTIVITIY SCORE: 24
DAILY ACTIVITIY SCORE: 24
MOBILITY SCORE: 24
MOBILITY SCORE: 24

## 2025-05-15 ASSESSMENT — PAIN - FUNCTIONAL ASSESSMENT
PAIN_FUNCTIONAL_ASSESSMENT: 0-10

## 2025-05-15 ASSESSMENT — PAIN SCALES - GENERAL
PAINLEVEL_OUTOF10: 9
PAINLEVEL_OUTOF10: 7
PAINLEVEL_OUTOF10: 9
PAINLEVEL_OUTOF10: 8
PAINLEVEL_OUTOF10: 9
PAINLEVEL_OUTOF10: 8
PAINLEVEL_OUTOF10: 9

## 2025-05-15 NOTE — PROGRESS NOTES
Spiritual Care Visit  Spiritual Care Request    Reason for Visit:  Routine Visit: Follow-up  Continue Visiting: Yes     Focus of Care:  Visited With: Patient       Spiritual Care Assessment    Care Provided:  Intended Effects: Build relationship of care and support, Demonstrate caring and concern  Methods: Encourage sharing of feelings, Offer support  Interventions: Acknowledge current situation, Active listening, Discuss coping mechanisms with someone    Spiritual Care Annotation    Annotation:   visited patient Joellen Narayan. Patient known to  from previous admissions. Patient welcomed visit from medical student who was shadowing along with facility Oliva rowley. Hand  provided before and after visit.     Patient shared how he was feeling this admission. He also shared news of the recent death of a close family member.  held space for patient to reflect on his life and share pictures. Patient expressed he has been coping okay, staying busy with work. Patient expressed hopes to travel over the summer.  provided care through reflective listening and supportive conversation. Patient was appreciative of care and did not have any further needs at this time. Spiritual Care remains available as needed/requested.    Rev. Diana Álvarez MDiv, [facility]

## 2025-05-16 ENCOUNTER — PHARMACY VISIT (OUTPATIENT)
Dept: PHARMACY | Facility: CLINIC | Age: 25
End: 2025-05-16
Payer: MEDICARE

## 2025-05-16 ENCOUNTER — TELEPHONE (OUTPATIENT)
Dept: RADIOLOGY | Facility: HOSPITAL | Age: 25
End: 2025-05-16

## 2025-05-16 ENCOUNTER — APPOINTMENT (OUTPATIENT)
Dept: RADIOLOGY | Facility: HOSPITAL | Age: 25
DRG: 981 | End: 2025-05-16
Payer: COMMERCIAL

## 2025-05-16 ENCOUNTER — HOME INFUSION (OUTPATIENT)
Dept: INFUSION THERAPY | Age: 25
End: 2025-05-16
Payer: COMMERCIAL

## 2025-05-16 VITALS
TEMPERATURE: 98.1 F | DIASTOLIC BLOOD PRESSURE: 74 MMHG | SYSTOLIC BLOOD PRESSURE: 121 MMHG | RESPIRATION RATE: 18 BRPM | HEART RATE: 83 BPM | WEIGHT: 160 LBS | BODY MASS INDEX: 20.53 KG/M2 | HEIGHT: 74 IN | OXYGEN SATURATION: 93 %

## 2025-05-16 DIAGNOSIS — M86.9 OSTEOMYELITIS, UNSPECIFIED SITE, UNSPECIFIED TYPE (MULTI): ICD-10-CM

## 2025-05-16 LAB
ALBUMIN SERPL BCP-MCNC: 3.9 G/DL (ref 3.4–5)
ALP SERPL-CCNC: 241 U/L (ref 33–120)
ALT SERPL W P-5'-P-CCNC: 59 U/L (ref 10–52)
ANION GAP SERPL CALC-SCNC: 13 MMOL/L (ref 10–20)
AST SERPL W P-5'-P-CCNC: 81 U/L (ref 9–39)
BASOPHILS # BLD AUTO: 0.1 X10*3/UL (ref 0–0.1)
BASOPHILS NFR BLD AUTO: 0.8 %
BILIRUB SERPL-MCNC: 8.7 MG/DL (ref 0–1.2)
BUN SERPL-MCNC: 9 MG/DL (ref 6–23)
CALCIUM SERPL-MCNC: 9.6 MG/DL (ref 8.6–10.6)
CHLORIDE SERPL-SCNC: 97 MMOL/L (ref 98–107)
CO2 SERPL-SCNC: 29 MMOL/L (ref 21–32)
CREAT SERPL-MCNC: 0.59 MG/DL (ref 0.5–1.3)
EGFRCR SERPLBLD CKD-EPI 2021: >90 ML/MIN/1.73M*2
EOSINOPHIL # BLD AUTO: 1.15 X10*3/UL (ref 0–0.7)
EOSINOPHIL NFR BLD AUTO: 9.4 %
ERYTHROCYTE [DISTWIDTH] IN BLOOD BY AUTOMATED COUNT: 20.2 % (ref 11.5–14.5)
GLUCOSE SERPL-MCNC: 77 MG/DL (ref 74–99)
HCT VFR BLD AUTO: 24.1 % (ref 41–52)
HGB BLD-MCNC: 8.3 G/DL (ref 13.5–17.5)
HGB RETIC QN: 32 PG (ref 28–38)
IMM GRANULOCYTES # BLD AUTO: 0.23 X10*3/UL (ref 0–0.7)
IMM GRANULOCYTES NFR BLD AUTO: 1.9 % (ref 0–0.9)
IMMATURE RETIC FRACTION: 24.6 %
LDH SERPL L TO P-CCNC: 453 U/L (ref 84–246)
LYMPHOCYTES # BLD AUTO: 2.74 X10*3/UL (ref 1.2–4.8)
LYMPHOCYTES NFR BLD AUTO: 22.4 %
MCH RBC QN AUTO: 30.2 PG (ref 26–34)
MCHC RBC AUTO-ENTMCNC: 34.4 G/DL (ref 32–36)
MCV RBC AUTO: 88 FL (ref 80–100)
MONOCYTES # BLD AUTO: 1.73 X10*3/UL (ref 0.1–1)
MONOCYTES NFR BLD AUTO: 14.1 %
NEUTROPHILS # BLD AUTO: 6.28 X10*3/UL (ref 1.2–7.7)
NEUTROPHILS NFR BLD AUTO: 51.4 %
NRBC BLD-RTO: 1.6 /100 WBCS (ref 0–0)
PLATELET # BLD AUTO: 665 X10*3/UL (ref 150–450)
POTASSIUM SERPL-SCNC: 3.8 MMOL/L (ref 3.5–5.3)
PROT SERPL-MCNC: 7 G/DL (ref 6.4–8.2)
RBC # BLD AUTO: 2.75 X10*6/UL (ref 4.5–5.9)
RETICS #: 0.46 X10*6/UL (ref 0.02–0.12)
RETICS/RBC NFR AUTO: 16.6 % (ref 0.5–2)
SODIUM SERPL-SCNC: 135 MMOL/L (ref 136–145)
WBC # BLD AUTO: 12.2 X10*3/UL (ref 4.4–11.3)

## 2025-05-16 PROCEDURE — 85045 AUTOMATED RETICULOCYTE COUNT: CPT

## 2025-05-16 PROCEDURE — 83615 LACTATE (LD) (LDH) ENZYME: CPT

## 2025-05-16 PROCEDURE — 2500000001 HC RX 250 WO HCPCS SELF ADMINISTERED DRUGS (ALT 637 FOR MEDICARE OP)

## 2025-05-16 PROCEDURE — 36573 INSJ PICC RS&I 5 YR+: CPT

## 2025-05-16 PROCEDURE — RXMED WILLOW AMBULATORY MEDICATION CHARGE

## 2025-05-16 PROCEDURE — C1751 CATH, INF, PER/CENT/MIDLINE: HCPCS

## 2025-05-16 PROCEDURE — 2500000004 HC RX 250 GENERAL PHARMACY W/ HCPCS (ALT 636 FOR OP/ED): Mod: JZ

## 2025-05-16 PROCEDURE — 02HV33Z INSERTION OF INFUSION DEVICE INTO SUPERIOR VENA CAVA, PERCUTANEOUS APPROACH: ICD-10-PCS | Performed by: HOSPITALIST

## 2025-05-16 PROCEDURE — 85025 COMPLETE CBC W/AUTO DIFF WBC: CPT

## 2025-05-16 PROCEDURE — 2500000004 HC RX 250 GENERAL PHARMACY W/ HCPCS (ALT 636 FOR OP/ED): Mod: JZ,TB

## 2025-05-16 PROCEDURE — 2780000003 HC OR 278 NO HCPCS

## 2025-05-16 PROCEDURE — 99239 HOSP IP/OBS DSCHRG MGMT >30: CPT

## 2025-05-16 PROCEDURE — 80053 COMPREHEN METABOLIC PANEL: CPT

## 2025-05-16 PROCEDURE — 2500000004 HC RX 250 GENERAL PHARMACY W/ HCPCS (ALT 636 FOR OP/ED)

## 2025-05-16 RX ORDER — OXYCODONE HYDROCHLORIDE 10 MG/1
20 TABLET ORAL EVERY 4 HOURS PRN
Refills: 0 | Status: DISCONTINUED | OUTPATIENT
Start: 2025-05-16 | End: 2025-05-16 | Stop reason: HOSPADM

## 2025-05-16 RX ORDER — OXYCODONE HYDROCHLORIDE 5 MG/1
20 TABLET ORAL EVERY 8 HOURS PRN
Qty: 21 TABLET | Refills: 0 | Status: SHIPPED | OUTPATIENT
Start: 2025-05-16 | End: 2025-05-16 | Stop reason: HOSPADM

## 2025-05-16 RX ORDER — OXYCODONE HYDROCHLORIDE 20 MG/1
20 TABLET ORAL EVERY 6 HOURS PRN
Qty: 28 TABLET | Refills: 0 | Status: ON HOLD | OUTPATIENT
Start: 2025-05-16 | End: 2025-05-25

## 2025-05-16 RX ORDER — OXYCODONE HCL 20 MG/1
20 TABLET, FILM COATED, EXTENDED RELEASE ORAL EVERY 12 HOURS
Qty: 28 TABLET | Refills: 0 | Status: SHIPPED | OUTPATIENT
Start: 2025-05-16 | End: 2025-05-16 | Stop reason: HOSPADM

## 2025-05-16 RX ORDER — LIDOCAINE HYDROCHLORIDE 10 MG/ML
5 INJECTION, SOLUTION INFILTRATION; PERINEURAL ONCE
Status: DISCONTINUED | OUTPATIENT
Start: 2025-05-16 | End: 2025-05-16 | Stop reason: HOSPADM

## 2025-05-16 RX ADMIN — HYDROMORPHONE HYDROCHLORIDE 2 MG: 2 INJECTION, SOLUTION INTRAMUSCULAR; INTRAVENOUS; SUBCUTANEOUS at 13:47

## 2025-05-16 RX ADMIN — HYDROMORPHONE HYDROCHLORIDE 3 MG: 2 INJECTION, SOLUTION INTRAMUSCULAR; INTRAVENOUS; SUBCUTANEOUS at 10:37

## 2025-05-16 RX ADMIN — POLYETHYLENE GLYCOL 3350 17 G: 17 POWDER, FOR SOLUTION ORAL at 08:55

## 2025-05-16 RX ADMIN — FOLIC ACID 1 MG: 1 TABLET ORAL at 08:54

## 2025-05-16 RX ADMIN — ERTAPENEM SODIUM 1 G: 1 INJECTION, POWDER, LYOPHILIZED, FOR SOLUTION INTRAMUSCULAR; INTRAVENOUS at 13:39

## 2025-05-16 RX ADMIN — HYDROMORPHONE HYDROCHLORIDE 6 MG: 2 INJECTION, SOLUTION INTRAMUSCULAR; INTRAVENOUS; SUBCUTANEOUS at 02:30

## 2025-05-16 RX ADMIN — HYDROMORPHONE HYDROCHLORIDE 6 MG: 2 INJECTION, SOLUTION INTRAMUSCULAR; INTRAVENOUS; SUBCUTANEOUS at 08:51

## 2025-05-16 RX ADMIN — HYDROMORPHONE HYDROCHLORIDE 6 MG: 2 INJECTION, SOLUTION INTRAMUSCULAR; INTRAVENOUS; SUBCUTANEOUS at 00:27

## 2025-05-16 RX ADMIN — HYDROMORPHONE HYDROCHLORIDE 6 MG: 2 INJECTION, SOLUTION INTRAMUSCULAR; INTRAVENOUS; SUBCUTANEOUS at 06:32

## 2025-05-16 RX ADMIN — HYDROMORPHONE HYDROCHLORIDE 6 MG: 2 INJECTION, SOLUTION INTRAMUSCULAR; INTRAVENOUS; SUBCUTANEOUS at 04:30

## 2025-05-16 RX ADMIN — OXYCODONE HYDROCHLORIDE 20 MG: 10 TABLET ORAL at 12:01

## 2025-05-16 RX ADMIN — OXYCODONE HYDROCHLORIDE 15 MG: 15 TABLET, FILM COATED, EXTENDED RELEASE ORAL at 08:54

## 2025-05-16 RX ADMIN — CYCLOBENZAPRINE 10 MG: 10 TABLET, FILM COATED ORAL at 08:54

## 2025-05-16 RX ADMIN — ASPIRIN 81 MG: 81 TABLET, CHEWABLE ORAL at 08:54

## 2025-05-16 RX ADMIN — SENNOSIDES AND DOCUSATE SODIUM 2 TABLET: 50; 8.6 TABLET ORAL at 06:33

## 2025-05-16 RX ADMIN — CYCLOBENZAPRINE 10 MG: 10 TABLET, FILM COATED ORAL at 14:23

## 2025-05-16 RX ADMIN — DAPTOMYCIN 450 MG: 500 INJECTION, POWDER, LYOPHILIZED, FOR SOLUTION INTRAVENOUS at 14:22

## 2025-05-16 ASSESSMENT — COGNITIVE AND FUNCTIONAL STATUS - GENERAL
DAILY ACTIVITIY SCORE: 24
MOBILITY SCORE: 24

## 2025-05-16 ASSESSMENT — PAIN SCALES - GENERAL
PAINLEVEL_OUTOF10: 9
PAINLEVEL_OUTOF10: 8
PAINLEVEL_OUTOF10: 7
PAINLEVEL_OUTOF10: 9
PAINLEVEL_OUTOF10: 9
PAINLEVEL_OUTOF10: 7
PAINLEVEL_OUTOF10: 9
PAINLEVEL_OUTOF10: 9

## 2025-05-16 NOTE — PROGRESS NOTES
"Joellen Narayan is a 24 y.o. male on day 13 of admission presenting with Sickle cell pain crisis (Multi).    Subjective   ***       Objective     Physical Exam    Last Recorded Vitals  Blood pressure 121/80, pulse 90, temperature 36.5 °C (97.7 °F), temperature source Temporal, resp. rate 18, height 1.88 m (6' 2\"), weight 72.6 kg (160 lb), SpO2 94%.  Intake/Output last 3 Shifts:  I/O last 3 completed shifts:  In: - (0 mL/kg)   Out: 1600 (22 mL/kg) [Urine:1600 (0.6 mL/kg/hr)]  Weight: 72.6 kg     Relevant Results  {If you would like to pull in Medications, type .meds     If you would like to pull in Lab results for the last 24 hours, type .[id number]    If you would like to pull in Imaging results, type .imgrslt :99}    {Link to Stroke Scoring tools - Link :99}                        Assessment & Plan  Sickle cell pain crisis (Multi)    Septic bursitis of elbow, right    Joellen Narayan 24 y.o. 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 Patient initially 98% RA, however became hypoxic in ED requiring 2-4 liters, unclear etiology for hypoxia at this time, possibly related to poor inspiratory effort in setting of severe pain vs ACS vs infectious process. Multiple CXR w/o evidence of acute process. 5/3 CT PE negative, similar GGOs from prior scans unchanged. Pt asymptomatic, denies respiratory s/s. On 5/5, pt reported worsening R arm pain. XR w joint effusion (5/7), CRP 16 (5/8), MRI R humerus/ radius (5/9) concerning for septic arthritis vs osteomyelitis elbow joint effusion. Ortho consulted, s/p R elbow I&D 5/10. Cultures pending (NGTD 5/12). S/p zosyn (5/8-5/12) and " Vanc (5/10-12). ID consulted, recommending IV daptomycin and ertapenem until 6/20/25 (started on 5/12). Ortho removed drain on 5/12. On 5/15 started started OxyContin 15 BID for further pain management. Discharge pending improvement in pain and PICC line placement for homegoing antibiotics.       Updates 5/15:   - started OxyContin 15 BID; titrate up pending tolerance and pain (discussed with Dr. Cruz)   - Orthos negative (dizzy walking back from bathroom)  - 85% on RA briefly, placed on 2L with improvement   - CXR negative for acute process   - walking pulse ox pending   - PICC placement pending    - CRP (10.56), ESR (24) repeated for increased pain 5/14      # VERÓNICA pain and edema   # osteomyelitis vs component of septic arthritis   - original etiology sickle cell related vs AVN vs osteomyelitis vs thrombus vs septic arthritis    - no imaging obtained on admit, pt states acute pain feels like sickle cell pain 5/6   - Jan 2025 there was ? hx of OM of lower extremities vs chronic SCD changes but no surgical interventions required at time therefore low threshold for MRI during this admission  - RUE US neg for DVT (5/5)   - ESR/ CRP <1/5.16 (5/4) --> <1/7.85 (5/5) --> <1, 6.15 (5/6) --> CRP 16.01 (5/8) --> CRP 10.5 (5/14); repeated 5/14 due to increased pain; improving   - 5/5 offered MRI R arm pt declining at this time even when offered with premedication  - attempted (5/7) MRI R arm (pt did not tolerate exam)  - 5/7 XR R arm showing large elbow joint effusion   - 5/8 pt became febrile x3, highest 38.8 s/p tylenol 650mg once   - started Zosyn (5/8- )/Vanc (5/10- ) and IVF x 48hr  - 5/8 Ortho consulted, s/p aspirate (>44k cells)  - 5/9 MRI w/anesthesia R radius/humerus showing concern for septic arthritis, osteomyelitis, myositis, severe muscle and subcutaneous soft tissue edema, and possible cellulitis  - s/p R elbow I&D 5/10 with ortho   - Tissue/ wound cultures NGTD, Fungal Cultures pending (NGTD 5/12)  - ID  consulted 5/10; following cultures; s/p Zosyn (5/8-5/12)/Vanc (5/10-5/12)   - 5/12 final Ortho recs; WBAT RUE, Mepilex remove POD7 (5/17/25), drain removed 5/12, require 6 weeks total of DVT prophylaxis from an orthopedic standpoint, Calcium/Vitamin D 500mg-400IU BID for 6 weeks, follow up w/ Dr. Tello 2 weeks after surgery for post-operative appointment (scheduled 6/9)  - 5/13, final ID recs for long term antibiotics; IV daptomycin 6mg/kg q24h and ertapenem 1g q24h until 6/20/25  - CK 45 (for baseline per ID)   - PICC ordered for homegoing antibiotics, pending placement as of 5/14  - Patient agreeable to outpatient OT/PT, will send script on discharge   - see below for pain regimen     # Hgb SS disease   - OARRS reviewed on admission, no aberrancies noted   - Hgb baseline: 9-10, 8.1 on admission, (5/5) Hg 7.5, in setting of worsening hemolysis and pain, s/p 1u pRBC --> Hgb 7.6 (5/6)  - 5/10 hgb dropped to 5.9 -received 2u PRBC  - 5/11 hgb 6.8 s/p 1 unit pRBCs --> hgb 7.5 (5/12) ->9.1 (5/14)  -  (5/12); -400; 515 (5/15)  - Bili 26.9 (5/9) --> 14.5 (5/12) --> 10.9 (5/15) ; CTM   - Leukocytosis most likely I/s/o OM as above; improved since admission   - Admission, s/p initiating 4mg dilaudid q2h prn, IV toradol 15mg q6h x 3 days, lidocaine patch, flexeril as needed, tylenol   - s/p increased to 5.5mg IVP dilaudid q2 PRN (5/5), offered dPCA, pt declining at this time as well   - s/p 6mg IVP dilaudid q2h prn for severe pain (5/6-5/13)  - decreased dilaudid to 4mg IVP Q2hrs (5/13-5/14); originally wanted to try rotating on 5/14; patient with increased pain on 5/14, increased back to 6 mg q2 hrs for severe pain (5/14- )  - started OxyContin 15 BID; titrate up pending tolerance and pain (discussed with Dr. Cruz)  - c/w home folic acid daily  - utox pending   - Exchange labs resulted 5/2-5/5, repeat ordered for 5/9   - 5/4 Hg S 92.8%  - bowel regimen for opioid induced constipation, zofran for opioid induced  nausea, and benadrly for opioid induced pruritis       # AHRF, requiring 2-4L on admission - improved   # Hx of nocturnal oxygen dependence  - On 2-3L at night, however patient reports daytime hypoxia as well in the past, currently on 2L 5/11  - unclear at this time, etiology includes acute chest vs poor inspiratory effort in setting of pain vs pneumonia vs PE vs pulm htn  - 5/3 Cxr w/o evidence of acute process   - 5/3 CT PE w/o evidence of PE, similar GGOs from prior likely atelectasis (similar to Dec 2024)   - strep pneum, legionella negative 5/4   - 5/5 ECHO EF 63%, LV severely dilated   - 5/5 repeat CXR neg for acute process, 5/8 CXR w/o evidence acute process   - Flu A/B, covid neg (5/5), respx cx 5/4 negative   - On room air as of 5/12 (nocturnal O2 at night), back to baseline   - Pulmonology outpatient appointment 5/21 with Dr. Deng  - On 5/15, patient desaturated briefly to 85% on RA, placed on 2L   - Repeat CXR negative for acute process (5/15)   - walking pulse ox pending 5/15   - Low threshold to repeat CT PE if continued hypoxia +/- worsening symptoms      # hx choledocholithiasis 7/2024 -improved   - worsening hemolysis in setting of active infection could be contributing to increased hyperbilirubinemia   - July 2024 s/p ERCP with biliary sphincterotomy where sludge and stones were removed, achieving complete clearance   - 1/31/25 was planned for cholecystectomy with Dr. Dove but no show on day of surgery and again 2/13 and 2/27   - blood cx x2 5/8 NGTD   - tbili baseline ~7-9, (5/6) 10.6 --> (5/8) 18.2 --> (5/9) 26.1 --> (5/10) 22.3 --> (5/12) 14.5   - RUQ US showing cholelithiasis with no cholecystitis as well as hepatomegaly and hepatic steatosis        # Hx Priapism  - no active issues on admit   - hx previously drained by urology   - c/w home sudafed PRN     # Nodular lymphocyte predominant Hodgkins lymphoma (NLPHL)    - Follows with Dr. Stoll  - s/p Rituxan and prednisone q3 weeks (C1  1/18/24, C2 2/8/24, Missed C3 d/t sickle cell pain crisis, C4 4/4/24, C5 5/16/24, C6 6/7/24)   - 3/13 No show to onc appt   - 4/9 PET showed interval development of new FDG focus in mid abdomen  - Outpatient appointment scheduled 5/27      # prophy  - ASA BID   - PT/OT (5/12)   - SCDs, encouraged ambulation      # dispo  - Code Status: Full Code (confirmed on admission)   - DC home resumed O2 and home care with IV antibiotics pending improvement in pain   - NOK: Melissa, mother, [phone]   - FUV onc 5/27, pulm 5/21, ortho surg 6/9, ID 6/17 7/9 sickle cell clinic    {This patient does not have an ACP note on file for this encounter, please fill one out - Advance Care Planning Activity :99}  I spent *** minutes in the professional and overall care of this patient.      Lorri Belle, APRN-CNP

## 2025-05-16 NOTE — POST-PROCEDURE NOTE
Pre-Procedure Checklist:  Emergent Line Insertion: No  Type of Line to be Placed: PICC  Consent Obtained: Yes  Emergency Medication Necessary: No  Patient Identified with 2 Independent Identifiers: Yes  Review of Allergies, Anticoagulation, Relevant Labs, ECG/Telemetry: Yes  Risks/Benefits/Alternatives Discussed with Patient/POA/Legal Representative: Yes  Stop Sign on Door: Yes  Time Out Performed: Yes  Catheter Exchange: Yes    Positioning Checklist:  All People, Including Patient, in the Room with Cap and Mask: Yes  Fluoroscopy Used to Identify Vessel and Guide Insertion: No   Sterile Cover Used: Yes  Full Barrier Precautions Followed (Mask, Cap, Gown, Gloves): Yes  Hands Washed: Yes  Monitors Attached with Sound Alarms On: No  Full Body Sterile Drape (Head-to-Toe) Used to Cover Patient: Yes  Trendelenburg Position (For IJ and Subclavian): No  CHG Skin Prep Used and Allowed to Air Dry to Skin Procedure: Yes    Procedure Checklist:  Blood Aspirated From All Lumens, All Ports Subsequently Flushed: Yes  Catheter Caps Placed on All Lumens; Lumens Clamped: Yes  Maintain Guidewire Control Throughout, Ensuring Guidewire Removal: Yes  Maintain Sterile Field Throughout Insertion: Yes  Catheter Secured: Yes  Confirmatory Test of Venous Placement: Non-Pulsatile Blood    Post Procedure Checklist:  Date and Time Written on Dressing: Yes  Sharp and Wire Count and Safe Disposal of all Sharps/Wires: Yes  Sterile Dressing Applied Per Protocol: Yes  X-ray Ordered or ECG Image: Yes    PICC Insertion Details:  Size (Fr): 4  Lumen Type: Single  Catheter to Vein Ratio Less Than 50%: Yes  Total Length (cm): 51  External Length (cm): 2  Orientation: Left  Location: Brachial  Site Prep: Chlorohexidine; Usual sterile procedure followed  Local Anesthetic: Injectable/Subcutaneous  Indication: Home antibiotics  Insertion Team Members in the Room: Nurse, Gabby Quintanilla LPN  Initial Extremity Circumference (cm): 27  Insertion Attempts:  1  Patient Tolerance: Tolerated Well, Age Appropriate  Comfort Measures: Subcutaneous anesthetic; Verbal  Procedure Location: Bedside  Safety Measures: Patient specific safety measures addressed with RN  Estimated Blood Loss (mL): 0  Vessel Fully Compressible Proximally and Distally to Insertion Site: Yes  Brisk Blood Return Obtained and Line Draws Easily: Yes  Tip Location: SVC  Line Confirmation: ECG  Lot #: [id number]  : BARD  PICC Line Exp Date: 3/31/2026  Securement: Stat Lock  Post Procedure Checklist: Handoff with RN; Obtain all new IV tubing prior to use; Bed at lowest level and wheels locked; Line discharge information at bedside.  Additional Details: Midline converted to PICC line via over the wire exchange  Placed by: Alicia Lomeli RN

## 2025-05-16 NOTE — PROGRESS NOTES
05/16/25 1400   Discharge Planning   Living Arrangements Family members   Support Systems Family members   Assistance Needed IV abx therapy   Type of Residence Private residence   Do you have animals or pets at home? No   Who is requesting discharge planning? Provider   Home or Post Acute Services In home services   Type of Home Care Services Home nursing visits   Expected Discharge Disposition Home H   Does the patient need discharge transport arranged? No     05/16/2025: Patient to discharge home today with home care through [facility] for IV abx therapy. SOC will be on 05/17/2025. Medications to be delivered this evening at 6pm. Patient mother will be caregiver and will receive teaching on IV abx therapy. Family to transport patient home. TCC to continue to follow for any additional discharge needs. Amberly SIERRA TCC

## 2025-05-16 NOTE — DISCHARGE SUMMARY
Discharge Diagnosis  Sickle cell pain crisis (Multi)    Issues Requiring Follow-Up  SCC, Left elbow infection      Test Results Pending At Discharge  Pending Labs       Order Current Status    Body fluid cell count with differential In process    Surgical Pathology Exam In process    Fungal Culture/Smear Preliminary result    Fungal Culture/Smear Preliminary result    Fungal Culture/Smear Preliminary result    Fungal Culture/Smear Preliminary result    Fungal Culture/Smear Preliminary result            Hospital Course   Joellen Narayan 24 y.o. 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 Patient initially 98% RA, however became hypoxic in ED requiring 2-4 liters, unclear etiology for hypoxia at this time, possibly related to poor inspiratory effort in setting of severe pain vs ACS vs infectious process. Multiple CXR w/o evidence of acute process. 5/3 CT PE negative, similar GGOs from prior scans unchanged. Pt asymptomatic, denies respiratory s/s. On 5/5, pt reported worsening R arm pain. XR w joint effusion (5/7), CRP 16 (5/8), MRI R humerus/ radius (5/9) concerning for septic arthritis vs osteomyelitis elbow joint effusion. Ortho consulted, s/p R elbow I&D 5/10. Cultures pending (NGTD 5/12). S/p zosyn (5/8-5/12) and Vanc (5/10-12). ID consulted, recommending IV daptomycin and ertapenem until 6/20/25 (started on 5/12). Ortho removed drain on 5/12. On 5/15 started started OxyContin 15 BID for further pain management.    On day of discharge, patient states pain improved and would like to go home. Discussed pain, states is 6/10 in right elbow. Sent PA to pharmacy for  oxycontin at discharge, however is not covered by insurance. Patient states would like to go home on previous oxycodone 20 mg PRN severe pain. Oxycodone, lidocaine patches, aspirin and calcium sent MTB. Discussed bandage removal for tomorrow. Spoke with [facility] pharmacy, antibiotics to be delivered this afternoon, patient states will be there to accept. PT/OT outpatient requests sent for left elbow. Discussed importance of follow up visits and taking medications as prescribed. Patient states has ride home, is ready for discharge. VSS, HDS, patient discharged home with [facility] for IV antibiotics and PICC line care.    FUV:  5/21: Dr. Deng Pulmonology  5/27: Dr. Stoll, heme-onc  6/9: Ortho surg Dr. Tello  6/17: ID with Dr. Altman  7/9: Homa Henry Sickle cell    Pertinent Physical Exam At Time of Discharge  Physical Exam  Constitutional:       Appearance: Normal appearance.   HENT:      Head: Normocephalic.   Eyes:      General: Scleral icterus present.      Extraocular Movements: Extraocular movements intact.      Pupils: Pupils are equal, round, and reactive to light.   Cardiovascular:      Rate and Rhythm: Normal rate and regular rhythm.      Pulses: Normal pulses.      Heart sounds: Normal heart sounds.   Pulmonary:      Effort: Pulmonary effort is normal.      Breath sounds: Normal breath sounds.   Abdominal:      General: Abdomen is flat. Bowel sounds are normal.      Palpations: Abdomen is soft.   Musculoskeletal:      Cervical back: Normal range of motion.      Comments: Right arm wrapped, able to move slightly, fingers warm   Skin:     General: Skin is warm.      Capillary Refill: Capillary refill takes less than 2 seconds.   Neurological:      General: No focal deficit present.      Mental Status: He is alert and oriented to person, place, and time.   Psychiatric:         Mood and Affect: Mood normal.         Behavior: Behavior normal.         Home Medications     Medication List      START taking these  medications     aspirin 81 mg chewable tablet; Chew 1 tablet (81 mg) 2 times a day.   calcium carbonate-vitamin D3 500 mg-10 mcg (400 unit) tablet; Commonly   known as: Oscal-500; Take 1 tablet by mouth 2 times a day.   ertapenem IV; Commonly known as: INVanz; Infuse 50 mL (1 g) over 30   minutes into a venous catheter once every 24 hours.   lidocaine 4 % patch; Place 2 patches over 12 hours on the skin once   daily for 7 days. Remove & discard patch within 12 hours or as directed by   MD.   sodium chloride 0.9% parenteral solution 50 mL with DAPTOmycin 50 mg/mL   recon soln 435 mg; Infuse 435 mg at 117.4 mL/hr over 30 minutes into a   venous catheter once every 24 hours.     CHANGE how you take these medications     oxyCODONE 20 mg immediate release tablet; Commonly known as: Roxicodone;   Take 1 tablet (20 mg) by mouth every 6 hours if needed for severe pain (7   - 10) for up to 7 days.; What changed: when to take this     CONTINUE taking these medications     folic acid 1 mg tablet; Commonly known as: Folvite; Take 1 tablet (1 mg)   by mouth once daily.   pseudoephedrine 60 mg tablet; Commonly known as: Sudogest; Take 1 tablet   (60 mg) by mouth every 8 hours if needed for congestion for up to 10 days.     STOP taking these medications     baclofen 5 mg tablet; Commonly known as: Lioresal   chlorhexidine 4 % external liquid; Commonly known as: Hibiclens     ASK your doctor about these medications     alteplase 2 mg injection; Commonly known as: Cathflo Activase; 2 mL (2   mg) by intra-catheter route 1 time for 1 dose.; Ask about: Should I take   this medication?       Outpatient Follow-Up  Future Appointments   Date Time Provider Department Center   5/17/2025 To Be Determined Reji Vega RN [facility]   5/21/2025  1:30 PM Cj Deng MD [id number] [facility]   5/27/2025  1:20 PM Melissa Stoll MD [id number] Academic   6/9/2025 12:00 PM Pillo Tello MD [id number] [facility]   6/17/2025 10:00 AM Jb ANDRADE  MD [place] [id number] [facility]   7/9/2025 10:00 AM RAAD Cannon-CNP [id number] Academic       RAAD Angel-CNP

## 2025-05-16 NOTE — NURSING NOTE
Patient discharged home. Patient went home with PICC line for IV antibiotics. Meds to beds were delivered and discharge papers were given to patient. Patient did not have any questions regarding discharge. Patient was educated and patient felt comfortable leaving at this time. Patient left with all belongings. Patient was HDS and VSS at time of discharge. Samir SIERRA    maximum assist (25% patients effort)

## 2025-05-17 ENCOUNTER — HOME CARE VISIT (OUTPATIENT)
Dept: HOME HEALTH SERVICES | Facility: HOME HEALTH | Age: 25
End: 2025-05-17
Payer: COMMERCIAL

## 2025-05-17 ENCOUNTER — CLINICAL SUPPORT (OUTPATIENT)
Dept: EMERGENCY MEDICINE | Facility: HOSPITAL | Age: 25
DRG: 812 | End: 2025-05-17
Payer: COMMERCIAL

## 2025-05-17 ENCOUNTER — HOSPITAL ENCOUNTER (INPATIENT)
Facility: HOSPITAL | Age: 25
DRG: 812 | End: 2025-05-17
Attending: EMERGENCY MEDICINE
Payer: COMMERCIAL

## 2025-05-17 ENCOUNTER — APPOINTMENT (OUTPATIENT)
Dept: RADIOLOGY | Facility: HOSPITAL | Age: 25
DRG: 812 | End: 2025-05-17
Payer: COMMERCIAL

## 2025-05-17 VITALS
HEIGHT: 74 IN | HEART RATE: 91 BPM | DIASTOLIC BLOOD PRESSURE: 88 MMHG | WEIGHT: 160 LBS | BODY MASS INDEX: 20.53 KG/M2 | TEMPERATURE: 99.5 F | OXYGEN SATURATION: 97 % | SYSTOLIC BLOOD PRESSURE: 132 MMHG | RESPIRATION RATE: 16 BRPM

## 2025-05-17 DIAGNOSIS — M71.121 SEPTIC BURSITIS OF ELBOW, RIGHT: ICD-10-CM

## 2025-05-17 DIAGNOSIS — M86.9 OSTEOMYELITIS, UNSPECIFIED SITE, UNSPECIFIED TYPE (MULTI): ICD-10-CM

## 2025-05-17 DIAGNOSIS — D57.00 SICKLE CELL ANEMIA WITH PAIN: ICD-10-CM

## 2025-05-17 DIAGNOSIS — G47.34 NOCTURNAL HYPOXIA: ICD-10-CM

## 2025-05-17 DIAGNOSIS — D57.00 HB-SS DISEASE WITH CRISIS: ICD-10-CM

## 2025-05-17 DIAGNOSIS — D57.00 SICKLE CELL PAIN CRISIS (MULTI): Primary | ICD-10-CM

## 2025-05-17 LAB
ALBUMIN SERPL BCP-MCNC: 4.6 G/DL (ref 3.4–5)
ALP SERPL-CCNC: 262 U/L (ref 33–120)
ALT SERPL W P-5'-P-CCNC: 76 U/L (ref 10–52)
ANION GAP SERPL CALC-SCNC: 15 MMOL/L (ref 10–20)
AST SERPL W P-5'-P-CCNC: 96 U/L (ref 9–39)
ATRIAL RATE: 86 BPM
BASOPHILS # BLD AUTO: 0.05 X10*3/UL (ref 0–0.1)
BASOPHILS NFR BLD AUTO: 0.4 %
BILIRUB SERPL-MCNC: 9.7 MG/DL (ref 0–1.2)
BUN SERPL-MCNC: 8 MG/DL (ref 6–23)
CALCIUM SERPL-MCNC: 9.7 MG/DL (ref 8.6–10.6)
CARDIAC TROPONIN I PNL SERPL HS: 4 NG/L (ref 0–53)
CARDIAC TROPONIN I PNL SERPL HS: 4 NG/L (ref 0–53)
CHLORIDE SERPL-SCNC: 101 MMOL/L (ref 98–107)
CO2 SERPL-SCNC: 22 MMOL/L (ref 21–32)
CREAT SERPL-MCNC: 0.56 MG/DL (ref 0.5–1.3)
EGFRCR SERPLBLD CKD-EPI 2021: >90 ML/MIN/1.73M*2
EOSINOPHIL # BLD AUTO: 0.2 X10*3/UL (ref 0–0.7)
EOSINOPHIL NFR BLD AUTO: 1.5 %
ERYTHROCYTE [DISTWIDTH] IN BLOOD BY AUTOMATED COUNT: 18.7 % (ref 11.5–14.5)
GLUCOSE SERPL-MCNC: 93 MG/DL (ref 74–99)
HCT VFR BLD AUTO: 24.4 % (ref 41–52)
HGB BLD-MCNC: 8.7 G/DL (ref 13.5–17.5)
HGB RETIC QN: 32 PG (ref 28–38)
IMM GRANULOCYTES # BLD AUTO: 0.14 X10*3/UL (ref 0–0.7)
IMM GRANULOCYTES NFR BLD AUTO: 1.1 % (ref 0–0.9)
IMMATURE RETIC FRACTION: 23 %
LDH SERPL L TO P-CCNC: 509 U/L (ref 84–246)
LYMPHOCYTES # BLD AUTO: 1.51 X10*3/UL (ref 1.2–4.8)
LYMPHOCYTES NFR BLD AUTO: 11.6 %
MAGNESIUM SERPL-MCNC: 2.44 MG/DL (ref 1.6–2.4)
MCH RBC QN AUTO: 30 PG (ref 26–34)
MCHC RBC AUTO-ENTMCNC: 35.7 G/DL (ref 32–36)
MCV RBC AUTO: 84 FL (ref 80–100)
MONOCYTES # BLD AUTO: 1 X10*3/UL (ref 0.1–1)
MONOCYTES NFR BLD AUTO: 7.7 %
NEUTROPHILS # BLD AUTO: 10.16 X10*3/UL (ref 1.2–7.7)
NEUTROPHILS NFR BLD AUTO: 77.7 %
NRBC BLD-RTO: 1.5 /100 WBCS (ref 0–0)
P AXIS: 60 DEGREES
P OFFSET: 184 MS
P ONSET: 132 MS
PLATELET # BLD AUTO: 799 X10*3/UL (ref 150–450)
POTASSIUM SERPL-SCNC: 4.1 MMOL/L (ref 3.5–5.3)
PR INTERVAL: 168 MS
PROT SERPL-MCNC: 8 G/DL (ref 6.4–8.2)
Q ONSET: 216 MS
QRS COUNT: 14 BEATS
QRS DURATION: 100 MS
QT INTERVAL: 370 MS
QTC CALCULATION(BAZETT): 442 MS
QTC FREDERICIA: 417 MS
R AXIS: 64 DEGREES
RBC # BLD AUTO: 2.9 X10*6/UL (ref 4.5–5.9)
RETICS #: 0.48 X10*6/UL (ref 0.02–0.12)
RETICS/RBC NFR AUTO: 16.5 % (ref 0.5–2)
SODIUM SERPL-SCNC: 134 MMOL/L (ref 136–145)
T AXIS: 18 DEGREES
T OFFSET: 401 MS
VENTRICULAR RATE: 86 BPM
WBC # BLD AUTO: 13.1 X10*3/UL (ref 4.4–11.3)

## 2025-05-17 PROCEDURE — 71046 X-RAY EXAM CHEST 2 VIEWS: CPT

## 2025-05-17 PROCEDURE — 96374 THER/PROPH/DIAG INJ IV PUSH: CPT

## 2025-05-17 PROCEDURE — 96375 TX/PRO/DX INJ NEW DRUG ADDON: CPT

## 2025-05-17 PROCEDURE — 85025 COMPLETE CBC W/AUTO DIFF WBC: CPT

## 2025-05-17 PROCEDURE — G0299 HHS/HOSPICE OF RN EA 15 MIN: HCPCS

## 2025-05-17 PROCEDURE — 2500000005 HC RX 250 GENERAL PHARMACY W/O HCPCS

## 2025-05-17 PROCEDURE — 84484 ASSAY OF TROPONIN QUANT: CPT

## 2025-05-17 PROCEDURE — 71046 X-RAY EXAM CHEST 2 VIEWS: CPT | Performed by: STUDENT IN AN ORGANIZED HEALTH CARE EDUCATION/TRAINING PROGRAM

## 2025-05-17 PROCEDURE — 83615 LACTATE (LD) (LDH) ENZYME: CPT

## 2025-05-17 PROCEDURE — 2500000001 HC RX 250 WO HCPCS SELF ADMINISTERED DRUGS (ALT 637 FOR MEDICARE OP)

## 2025-05-17 PROCEDURE — 83021 HEMOGLOBIN CHROMOTOGRAPHY: CPT

## 2025-05-17 PROCEDURE — 85045 AUTOMATED RETICULOCYTE COUNT: CPT

## 2025-05-17 PROCEDURE — 2500000004 HC RX 250 GENERAL PHARMACY W/ HCPCS (ALT 636 FOR OP/ED): Mod: JZ

## 2025-05-17 PROCEDURE — 93005 ELECTROCARDIOGRAM TRACING: CPT

## 2025-05-17 PROCEDURE — 96376 TX/PRO/DX INJ SAME DRUG ADON: CPT

## 2025-05-17 PROCEDURE — 93010 ELECTROCARDIOGRAM REPORT: CPT | Performed by: NURSE PRACTITIONER

## 2025-05-17 PROCEDURE — 99222 1ST HOSP IP/OBS MODERATE 55: CPT

## 2025-05-17 PROCEDURE — 80053 COMPREHEN METABOLIC PANEL: CPT

## 2025-05-17 PROCEDURE — 1170000001 HC PRIVATE ONCOLOGY ROOM DAILY

## 2025-05-17 PROCEDURE — 83735 ASSAY OF MAGNESIUM: CPT

## 2025-05-17 PROCEDURE — 83020 HEMOGLOBIN ELECTROPHORESIS: CPT | Performed by: PATHOLOGY

## 2025-05-17 PROCEDURE — 2500000004 HC RX 250 GENERAL PHARMACY W/ HCPCS (ALT 636 FOR OP/ED): Mod: JZ,TB

## 2025-05-17 PROCEDURE — 99285 EMERGENCY DEPT VISIT HI MDM: CPT | Mod: 25 | Performed by: EMERGENCY MEDICINE

## 2025-05-17 PROCEDURE — 99285 EMERGENCY DEPT VISIT HI MDM: CPT | Performed by: EMERGENCY MEDICINE

## 2025-05-17 PROCEDURE — 96361 HYDRATE IV INFUSION ADD-ON: CPT

## 2025-05-17 RX ORDER — LIDOCAINE 560 MG/1
2 PATCH PERCUTANEOUS; TOPICAL; TRANSDERMAL DAILY
Status: DISCONTINUED | OUTPATIENT
Start: 2025-05-17 | End: 2025-05-26 | Stop reason: HOSPADM

## 2025-05-17 RX ORDER — KETOROLAC TROMETHAMINE 15 MG/ML
15 INJECTION, SOLUTION INTRAMUSCULAR; INTRAVENOUS ONCE
Status: COMPLETED | OUTPATIENT
Start: 2025-05-17 | End: 2025-05-17

## 2025-05-17 RX ORDER — FOLIC ACID 1 MG/1
1 TABLET ORAL DAILY
Status: DISCONTINUED | OUTPATIENT
Start: 2025-05-17 | End: 2025-05-26 | Stop reason: HOSPADM

## 2025-05-17 RX ORDER — ONDANSETRON 8 MG/1
8 TABLET, ORALLY DISINTEGRATING ORAL EVERY 8 HOURS PRN
Status: DISCONTINUED | OUTPATIENT
Start: 2025-05-17 | End: 2025-05-26 | Stop reason: HOSPADM

## 2025-05-17 RX ORDER — AMOXICILLIN 250 MG
2 CAPSULE ORAL NIGHTLY
Status: DISCONTINUED | OUTPATIENT
Start: 2025-05-17 | End: 2025-05-18

## 2025-05-17 RX ORDER — HYDROMORPHONE HYDROCHLORIDE 1 MG/ML
1 INJECTION, SOLUTION INTRAMUSCULAR; INTRAVENOUS; SUBCUTANEOUS ONCE
Status: COMPLETED | OUTPATIENT
Start: 2025-05-17 | End: 2025-05-17

## 2025-05-17 RX ORDER — POLYETHYLENE GLYCOL 3350 17 G/17G
17 POWDER, FOR SOLUTION ORAL DAILY
Status: DISCONTINUED | OUTPATIENT
Start: 2025-05-17 | End: 2025-05-26 | Stop reason: HOSPADM

## 2025-05-17 RX ORDER — ONDANSETRON HYDROCHLORIDE 2 MG/ML
4 INJECTION, SOLUTION INTRAVENOUS EVERY 8 HOURS PRN
Status: DISCONTINUED | OUTPATIENT
Start: 2025-05-17 | End: 2025-05-18

## 2025-05-17 RX ORDER — NAPROXEN SODIUM 220 MG/1
81 TABLET, FILM COATED ORAL 2 TIMES DAILY
Status: DISCONTINUED | OUTPATIENT
Start: 2025-05-17 | End: 2025-05-26 | Stop reason: HOSPADM

## 2025-05-17 RX ORDER — DIPHENHYDRAMINE HCL 25 MG
25 CAPSULE ORAL EVERY 6 HOURS PRN
Status: DISCONTINUED | OUTPATIENT
Start: 2025-05-17 | End: 2025-05-26 | Stop reason: HOSPADM

## 2025-05-17 RX ORDER — ERTAPENEM 1 G/1
1 INJECTION, POWDER, LYOPHILIZED, FOR SOLUTION INTRAMUSCULAR; INTRAVENOUS EVERY 24 HOURS
Status: DISCONTINUED | OUTPATIENT
Start: 2025-05-18 | End: 2025-05-26 | Stop reason: HOSPADM

## 2025-05-17 RX ORDER — PSYLLIUM HUSK 0.4 G
1 CAPSULE ORAL 2 TIMES DAILY
Status: DISCONTINUED | OUTPATIENT
Start: 2025-05-17 | End: 2025-05-26 | Stop reason: HOSPADM

## 2025-05-17 RX ADMIN — HYDROMORPHONE HYDROCHLORIDE 1 MG: 1 INJECTION, SOLUTION INTRAMUSCULAR; INTRAVENOUS; SUBCUTANEOUS at 15:14

## 2025-05-17 RX ADMIN — LIDOCAINE 4% 2 PATCH: 40 PATCH TOPICAL at 20:58

## 2025-05-17 RX ADMIN — ASPIRIN 81 MG CHEWABLE TABLET 81 MG: 81 TABLET CHEWABLE at 20:59

## 2025-05-17 RX ADMIN — FOLIC ACID 1 MG: 1 TABLET ORAL at 20:59

## 2025-05-17 RX ADMIN — KETOROLAC TROMETHAMINE 15 MG: 15 INJECTION, SOLUTION INTRAMUSCULAR; INTRAVENOUS at 17:30

## 2025-05-17 RX ADMIN — SODIUM CHLORIDE, SODIUM LACTATE, POTASSIUM CHLORIDE, AND CALCIUM CHLORIDE 1000 ML: .6; .31; .03; .02 INJECTION, SOLUTION INTRAVENOUS at 17:30

## 2025-05-17 RX ADMIN — HYDROMORPHONE HYDROCHLORIDE 1 MG: 1 INJECTION, SOLUTION INTRAMUSCULAR; INTRAVENOUS; SUBCUTANEOUS at 16:09

## 2025-05-17 RX ADMIN — Medication 5 ML: at 13:30

## 2025-05-17 RX ADMIN — HYDROMORPHONE HYDROCHLORIDE 1 MG: 1 INJECTION, SOLUTION INTRAMUSCULAR; INTRAVENOUS; SUBCUTANEOUS at 17:24

## 2025-05-17 RX ADMIN — Medication 10 ML: at 12:45

## 2025-05-17 RX ADMIN — HYDROMORPHONE HYDROCHLORIDE 4 MG: 2 INJECTION, SOLUTION INTRAMUSCULAR; INTRAVENOUS; SUBCUTANEOUS at 21:42

## 2025-05-17 RX ADMIN — HYDROMORPHONE HYDROCHLORIDE 4 MG: 2 INJECTION, SOLUTION INTRAMUSCULAR; INTRAVENOUS; SUBCUTANEOUS at 23:43

## 2025-05-17 RX ADMIN — Medication 1 TABLET: at 20:58

## 2025-05-17 RX ADMIN — ERTAPENEM 1 G: 1 INJECTION, POWDER, LYOPHILIZED, FOR SOLUTION INTRAMUSCULAR; INTRAVENOUS at 12:45

## 2025-05-17 SDOH — SOCIAL STABILITY: SOCIAL INSECURITY: HAVE YOU HAD ANY THOUGHTS OF HARMING ANYONE ELSE?: NO

## 2025-05-17 SDOH — SOCIAL STABILITY: SOCIAL INSECURITY: DOES ANYONE TRY TO KEEP YOU FROM HAVING/CONTACTING OTHER FRIENDS OR DOING THINGS OUTSIDE YOUR HOME?: NO

## 2025-05-17 SDOH — SOCIAL STABILITY: SOCIAL INSECURITY: ARE YOU OR HAVE YOU BEEN THREATENED OR ABUSED PHYSICALLY, EMOTIONALLY, OR SEXUALLY BY ANYONE?: NO

## 2025-05-17 SDOH — SOCIAL STABILITY: SOCIAL INSECURITY: HAS ANYONE EVER THREATENED TO HURT YOUR FAMILY OR YOUR PETS?: NO

## 2025-05-17 SDOH — SOCIAL STABILITY: SOCIAL INSECURITY: WITHIN THE LAST YEAR, HAVE YOU BEEN HUMILIATED OR EMOTIONALLY ABUSED IN OTHER WAYS BY YOUR PARTNER OR EX-PARTNER?: NO

## 2025-05-17 SDOH — SOCIAL STABILITY: SOCIAL INSECURITY: ARE THERE ANY APPARENT SIGNS OF INJURIES/BEHAVIORS THAT COULD BE RELATED TO ABUSE/NEGLECT?: NO

## 2025-05-17 SDOH — ECONOMIC STABILITY: FOOD INSECURITY: WITHIN THE PAST 12 MONTHS, THE FOOD YOU BOUGHT JUST DIDN'T LAST AND YOU DIDN'T HAVE MONEY TO GET MORE.: NEVER TRUE

## 2025-05-17 SDOH — ECONOMIC STABILITY: HOUSING INSECURITY: AT ANY TIME IN THE PAST 12 MONTHS, WERE YOU HOMELESS OR LIVING IN A SHELTER (INCLUDING NOW)?: NO

## 2025-05-17 SDOH — ECONOMIC STABILITY: HOUSING INSECURITY: IN THE PAST 12 MONTHS, HOW MANY TIMES HAVE YOU MOVED WHERE YOU WERE LIVING?: 0

## 2025-05-17 SDOH — ECONOMIC STABILITY: FOOD INSECURITY: WITHIN THE PAST 12 MONTHS, YOU WORRIED THAT YOUR FOOD WOULD RUN OUT BEFORE YOU GOT THE MONEY TO BUY MORE.: NEVER TRUE

## 2025-05-17 SDOH — SOCIAL STABILITY: SOCIAL INSECURITY: DO YOU FEEL UNSAFE GOING BACK TO THE PLACE WHERE YOU ARE LIVING?: NO

## 2025-05-17 SDOH — SOCIAL STABILITY: SOCIAL INSECURITY: WERE YOU ABLE TO COMPLETE ALL THE BEHAVIORAL HEALTH SCREENINGS?: YES

## 2025-05-17 SDOH — HEALTH STABILITY: MENTAL HEALTH: SMOKING IN HOME: 0

## 2025-05-17 SDOH — ECONOMIC STABILITY: HOUSING INSECURITY: IN THE LAST 12 MONTHS, WAS THERE A TIME WHEN YOU WERE NOT ABLE TO PAY THE MORTGAGE OR RENT ON TIME?: NO

## 2025-05-17 SDOH — SOCIAL STABILITY: SOCIAL INSECURITY: HAVE YOU HAD THOUGHTS OF HARMING ANYONE ELSE?: NO

## 2025-05-17 SDOH — SOCIAL STABILITY: SOCIAL INSECURITY: DO YOU FEEL ANYONE HAS EXPLOITED OR TAKEN ADVANTAGE OF YOU FINANCIALLY OR OF YOUR PERSONAL PROPERTY?: NO

## 2025-05-17 SDOH — SOCIAL STABILITY: SOCIAL INSECURITY: WITHIN THE LAST YEAR, HAVE YOU BEEN AFRAID OF YOUR PARTNER OR EX-PARTNER?: NO

## 2025-05-17 SDOH — ECONOMIC STABILITY: TRANSPORTATION INSECURITY: IN THE PAST 12 MONTHS, HAS LACK OF TRANSPORTATION KEPT YOU FROM MEDICAL APPOINTMENTS OR FROM GETTING MEDICATIONS?: NO

## 2025-05-17 SDOH — SOCIAL STABILITY: SOCIAL INSECURITY: ABUSE: ADULT

## 2025-05-17 SDOH — ECONOMIC STABILITY: FOOD INSECURITY: HOW HARD IS IT FOR YOU TO PAY FOR THE VERY BASICS LIKE FOOD, HOUSING, MEDICAL CARE, AND HEATING?: NOT HARD AT ALL

## 2025-05-17 SDOH — ECONOMIC STABILITY: INCOME INSECURITY: IN THE PAST 12 MONTHS HAS THE ELECTRIC, GAS, OIL, OR WATER COMPANY THREATENED TO SHUT OFF SERVICES IN YOUR HOME?: NO

## 2025-05-17 SDOH — ECONOMIC STABILITY: HOUSING INSECURITY: EVIDENCE OF SMOKING MATERIAL: 0

## 2025-05-17 ASSESSMENT — ACTIVITIES OF DAILY LIVING (ADL)
FEEDING YOURSELF: INDEPENDENT
TOILETING: INDEPENDENT
DRESSING YOURSELF: INDEPENDENT
BATHING: INDEPENDENT
LACK_OF_TRANSPORTATION: NO
OASIS_M1830: 02
ADEQUATE_TO_COMPLETE_ADL: YES
GROOMING: INDEPENDENT
CURRENT_FUNCTION: SUPERVISION
PATIENT'S MEMORY ADEQUATE TO SAFELY COMPLETE DAILY ACTIVITIES?: YES
ASSISTIVE_DEVICE: SLING RUE
PHYSICAL TRANSFERS ASSESSED: 1
AMBULATION ASSISTANCE: 1
HEARING - RIGHT EAR: FUNCTIONAL
AMBULATION ASSISTANCE: SUPERVISION
LACK_OF_TRANSPORTATION: NO
ENTERING_EXITING_HOME: NEEDS ASSISTANCE
JUDGMENT_ADEQUATE_SAFELY_COMPLETE_DAILY_ACTIVITIES: YES
HEARING - LEFT EAR: FUNCTIONAL
WALKS IN HOME: INDEPENDENT

## 2025-05-17 ASSESSMENT — ENCOUNTER SYMPTOMS
HIGHEST PAIN SEVERITY IN PAST 24 HOURS: 8/10
CHILLS: 0
NAUSEA: 1
PAIN LOCATION - PAIN SEVERITY: 8/10
PAIN LOCATION: ABDOMEN
VOMITING: 0
ABDOMINAL PAIN: 1
APPETITE LEVEL: FAIR
SUBJECTIVE PAIN PROGRESSION: WAXING AND WANING
PAIN LOCATION - PAIN FREQUENCY: INTERMITTENT
HEMATOLOGIC/LYMPHATIC NEGATIVE: 1
ABDOMINAL PAIN: 0
ALLERGIC/IMMUNOLOGIC NEGATIVE: 1
LOWEST PAIN SEVERITY IN PAST 24 HOURS: 5/10
ARTHRALGIAS: 1
PAIN LOCATION: RIGHT ELBOW
FEVER: 0
COUGH: 0
CHANGE IN APPETITE: UNCHANGED
NAUSEA: 1
BLOOD IN STOOL: 0
PSYCHIATRIC NEGATIVE: 1
SHORTNESS OF BREATH: 0
PAIN LOCATION - PAIN FREQUENCY: CONSTANT
PAIN LOCATION - PAIN QUALITY: ACHY
PAIN SEVERITY GOAL: 0/10
PAIN LOCATION - PAIN QUALITY: ACHY
CONSTIPATION: 0
DIZZINESS: 1
LAST BOWEL MOVEMENT: 67341
PAIN LOCATION - PAIN SEVERITY: 8/10
DIARRHEA: 0
PERSON REPORTING PAIN: PATIENT
ENDOCRINE NEGATIVE: 1
PAIN: 1

## 2025-05-17 ASSESSMENT — LIFESTYLE VARIABLES
SKIP TO QUESTIONS 9-10: 1
AUDIT-C TOTAL SCORE: 0
HAVE PEOPLE ANNOYED YOU BY CRITICIZING YOUR DRINKING: NO
EVER HAD A DRINK FIRST THING IN THE MORNING TO STEADY YOUR NERVES TO GET RID OF A HANGOVER: NO
HOW OFTEN DO YOU HAVE A DRINK CONTAINING ALCOHOL: NEVER
HOW MANY STANDARD DRINKS CONTAINING ALCOHOL DO YOU HAVE ON A TYPICAL DAY: PATIENT DOES NOT DRINK
TOTAL SCORE: 0
AUDIT-C TOTAL SCORE: 0
EVER FELT BAD OR GUILTY ABOUT YOUR DRINKING: NO
HOW OFTEN DO YOU HAVE 6 OR MORE DRINKS ON ONE OCCASION: NEVER
HAVE YOU EVER FELT YOU SHOULD CUT DOWN ON YOUR DRINKING: NO

## 2025-05-17 ASSESSMENT — PAIN DESCRIPTION - ORIENTATION
ORIENTATION: MID
ORIENTATION: LOWER
ORIENTATION: LOWER

## 2025-05-17 ASSESSMENT — PAIN DESCRIPTION - DESCRIPTORS
DESCRIPTORS: THROBBING
DESCRIPTORS: THROBBING

## 2025-05-17 ASSESSMENT — PAIN SCALES - GENERAL
PAINLEVEL_OUTOF10: 10 - WORST POSSIBLE PAIN
PAINLEVEL_OUTOF10: 6
PAINLEVEL_OUTOF10: 10 - WORST POSSIBLE PAIN
PAINLEVEL_OUTOF10: 10 - WORST POSSIBLE PAIN
PAINLEVEL_OUTOF10: 9
PAINLEVEL_OUTOF10: 10 - WORST POSSIBLE PAIN

## 2025-05-17 ASSESSMENT — COGNITIVE AND FUNCTIONAL STATUS - GENERAL
DAILY ACTIVITIY SCORE: 24
PATIENT BASELINE BEDBOUND: NO
MOBILITY SCORE: 24

## 2025-05-17 ASSESSMENT — PAIN DESCRIPTION - LOCATION
LOCATION: BACK
LOCATION: BACK
LOCATION: CHEST

## 2025-05-17 ASSESSMENT — PATIENT HEALTH QUESTIONNAIRE - PHQ9
2. FEELING DOWN, DEPRESSED OR HOPELESS: NOT AT ALL
1. LITTLE INTEREST OR PLEASURE IN DOING THINGS: NOT AT ALL
SUM OF ALL RESPONSES TO PHQ9 QUESTIONS 1 & 2: 0

## 2025-05-17 ASSESSMENT — PAIN - FUNCTIONAL ASSESSMENT
PAIN_FUNCTIONAL_ASSESSMENT: 0-10

## 2025-05-17 ASSESSMENT — PAIN DESCRIPTION - PAIN TYPE: TYPE: ACUTE PAIN;CHRONIC PAIN
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
## 2025-05-17 NOTE — ED TRIAGE NOTES
Patient presents to the Emergency department with a chief complaint of sickle cell pain crisis with pain to his chest. Patient endorses mid sternal chest pain that is intermittent and throbbing in nature. Patient states his pain does not radiate. Patient states this pain is consistent with his sickle cell pain crisis presentation. Patient states his home health nurse was finishing up his at-home IV medications when his pain crisis began. Patient denies any other medical complaints at this time.

## 2025-05-17 NOTE — H&P
History Of Present Illness  Joellen Narayan is a 24 y.o. 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 CXR on admission shows no acute cardiopulmonary process. 5/17 EKG NSR 86 bpm, no ST elevations or depressions, no T wave inversions or spikes; possible enlarged left ventricle compared to previous. On admission, troponin 4 x 2, WBC 13.1, hgb 8.7, , Tbili 9.7. Patient admitted for sickle cell pain management.    On admit, patient admits to having CP, dizziness, and nausea after self-administration of antibiotic at home. Symptoms have mostly resolved but the chest pain has persisted. When asked if CP was a typical sickle cell pain location for him, he said yes. When asked if this felt like a pain crisis for him, he also stated yes. Otherwise, patient denies fever, chills, cough, HA, palpitations, abdominal pain, N/V/C/D, swelling in legs, abnormal bruising and bleeding.    ED course:  Labs: Na 134, Potassium 4.1, Scr 0.56, BUN 8, ALT 76, AST 96, T bili 9.7, Mag 2.44, , Troponin 4, WBC 13.1, Hgb 8.7, hematocrit 24.4, plt 799, retic 16.5 %, retic abs. 0.480.  Imaging:  CXR on admission shows no acute cardiopulmonary process; EKG NSR 86 bpm, no ST elevations or depressions, no T wave inversions or spikes; possible enlarged left ventricle compared to previous  Interventions: Dilaudid 1 mg IV x 3, toradol 15 mg x 1 IV, LR 1 L bolus       Past Medical History  He has a past medical history of Acute chest syndrome (Multi), Corrosion of unspecified body region, unspecified degree  (12/31/2014), Impetigo (01/04/2024), Nicotine use disorder, Nodular lymphocyte predominant Hodgkin lymphoma, Non Hodgkin's lymphoma, Personal history of diseases of the blood and blood-forming organs and certain disorders involving the immune mechanism, Personal history of other (healed) physical injury and trauma (01/03/2015), Personal history of other diseases of the circulatory system, Personal history of other diseases of the circulatory system, Priapism, Rash and other nonspecific skin eruption (09/09/2014), and Sickle cell disease (Multi).    Surgical History  He has a past surgical history that includes IR CVC tunneled (6/21/2022); CT guided percutaneous biopsy LYMPH node superficial (11/18/2022); and CT guided percutaneous abdominal retroperitoneum biopsy (11/30/2023).    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 He has been exposed to tobacco smoke. He has never used smokeless tobacco. He reports that he does not currently use alcohol. He reports current drug use. Drug: Marijuana.     Allergies  Cefepime, Amoxicillin, and Ceftriaxone    Review of Systems   Constitutional:  Negative for chills and fever.   HENT: Negative.     Respiratory:  Negative for cough and shortness of breath.    Cardiovascular:  Positive for chest pain.   Gastrointestinal:  Positive for nausea. Negative for abdominal pain, blood in stool, constipation, diarrhea and vomiting.   Endocrine: Negative.    Genitourinary: Negative.    Musculoskeletal:  Positive for arthralgias.   Skin: Negative.    Allergic/Immunologic: Negative.    Neurological:  Positive  "for dizziness.        Prior to coming in, has since resolved   Hematological: Negative.    Psychiatric/Behavioral: Negative.          Physical Exam  Constitutional:       General: He is not in acute distress.  HENT:      Mouth/Throat:      Mouth: Mucous membranes are moist.   Eyes:      Pupils: Pupils are equal, round, and reactive to light.   Cardiovascular:      Rate and Rhythm: Normal rate and regular rhythm.   Pulmonary:      Effort: Pulmonary effort is normal.      Breath sounds: No wheezing, rhonchi or rales.   Abdominal:      General: Bowel sounds are normal. There is no distension.      Palpations: Abdomen is soft.      Tenderness: There is no abdominal tenderness.   Musculoskeletal:         General: Tenderness present.      Comments: To right elbow s/p I&D 5/10, decreased ROM   Skin:     General: Skin is warm.   Neurological:      General: No focal deficit present.      Mental Status: He is alert.          Last Recorded Vitals  Blood pressure 124/73, pulse 86, temperature 36.9 °C (98.4 °F), temperature source Oral, resp. rate 16, height 1.88 m (6' 2\"), weight 74.8 kg (165 lb), SpO2 95%.    Relevant Results      Scheduled medications  Scheduled Medications[1]  Continuous medications  Continuous Medications[2]  PRN medications  PRN Medications[3]  Results for orders placed or performed during the hospital encounter of 05/17/25 (from the past 24 hours)   ECG 12 lead   Result Value Ref Range    Ventricular Rate 86 BPM    Atrial Rate 86 BPM    CT Interval 168 ms    QRS Duration 100 ms    QT Interval 370 ms    QTC Calculation(Bazett) 442 ms    P Axis 60 degrees    R Axis 64 degrees    T Axis 18 degrees    QRS Count 14 beats    Q Onset 216 ms    P Onset 132 ms    P Offset 184 ms    T Offset 401 ms    QTC Fredericia 417 ms   CBC and Auto Differential   Result Value Ref Range    WBC 13.1 (H) 4.4 - 11.3 x10*3/uL    nRBC 1.5 (H) 0.0 - 0.0 /100 WBCs    RBC 2.90 (L) 4.50 - 5.90 x10*6/uL    Hemoglobin 8.7 (L) 13.5 - 17.5 " g/dL    Hematocrit 24.4 (L) 41.0 - 52.0 %    MCV 84 80 - 100 fL    MCH 30.0 26.0 - 34.0 pg    MCHC 35.7 32.0 - 36.0 g/dL    RDW 18.7 (H) 11.5 - 14.5 %    Platelets 799 (H) 150 - 450 x10*3/uL    Neutrophils % 77.7 40.0 - 80.0 %    Immature Granulocytes %, Automated 1.1 (H) 0.0 - 0.9 %    Lymphocytes % 11.6 13.0 - 44.0 %    Monocytes % 7.7 2.0 - 10.0 %    Eosinophils % 1.5 0.0 - 6.0 %    Basophils % 0.4 0.0 - 2.0 %    Neutrophils Absolute 10.16 (H) 1.20 - 7.70 x10*3/uL    Immature Granulocytes Absolute, Automated 0.14 0.00 - 0.70 x10*3/uL    Lymphocytes Absolute 1.51 1.20 - 4.80 x10*3/uL    Monocytes Absolute 1.00 0.10 - 1.00 x10*3/uL    Eosinophils Absolute 0.20 0.00 - 0.70 x10*3/uL    Basophils Absolute 0.05 0.00 - 0.10 x10*3/uL   Comprehensive metabolic panel   Result Value Ref Range    Glucose 93 74 - 99 mg/dL    Sodium 134 (L) 136 - 145 mmol/L    Potassium 4.1 3.5 - 5.3 mmol/L    Chloride 101 98 - 107 mmol/L    Bicarbonate 22 21 - 32 mmol/L    Anion Gap 15 10 - 20 mmol/L    Urea Nitrogen 8 6 - 23 mg/dL    Creatinine 0.56 0.50 - 1.30 mg/dL    eGFR >90 >60 mL/min/1.73m*2    Calcium 9.7 8.6 - 10.6 mg/dL    Albumin 4.6 3.4 - 5.0 g/dL    Alkaline Phosphatase 262 (H) 33 - 120 U/L    Total Protein 8.0 6.4 - 8.2 g/dL    AST 96 (H) 9 - 39 U/L    Bilirubin, Total 9.7 (H) 0.0 - 1.2 mg/dL    ALT 76 (H) 10 - 52 U/L   Magnesium   Result Value Ref Range    Magnesium 2.44 (H) 1.60 - 2.40 mg/dL   Reticulocytes   Result Value Ref Range    Retic % 16.5 (H) 0.5 - 2.0 %    Retic Absolute 0.480 (H) 0.022 - 0.118 x10*6/uL    Reticulocyte Hemoglobin 32 28 - 38 pg    Immature Retic fraction 23.0 (H) <=16.0 %   LDH, Lactate dehydrogenase   Result Value Ref Range     (H) 84 - 246 U/L   Troponin I, High Sensitivity, Initial   Result Value Ref Range    Troponin I, High Sensitivity (CMC) 4 0 - 53 ng/L   Troponin, High Sensitivity, 1 Hour   Result Value Ref Range    Troponin I, High Sensitivity (CMC) 4 0 - 53 ng/L     *Note: Due to a  large number of results and/or encounters for the requested time period, some results have not been displayed. A complete set of results can be found in Results Review.    ECG 12 lead  Result Date: 5/17/2025  Normal sinus rhythm Minimal voltage criteria for LVH, may be normal variant Borderline ECG When compared with ECG of 16-MAY-2025 11:04, Premature ventricular complexes are no longer Present Katharina-Parkinson-White is no longer Present See ED provider note for full interpretation and clinical correlation Confirmed by Alix Duran ([id number]) on 5/17/2025 8:16:05 PM    XR chest 2 views  Result Date: 5/17/2025  Interpreted By:  Tung Polanco  and Chad Cordero STUDY: XR CHEST 2 VIEWS;  5/17/2025 3:09 pm   INDICATION: Signs/Symptoms:r/o pneumothorax, pneumonia, acute chest syndrome.   COMPARISON: XR CHEST 1 VIEW 5/15/2025, CT ANGIO CHEST FOR PULMONARY EMBOLISM 5/3/2025   ACCESSION NUMBER(S): [id number]   ORDERING CLINICIAN: RUPINDER WORKMAN   FINDINGS: PA and lateral radiographs of the chest were provided. Left upper extremity PICC with terminus at the SVC.   CARDIOMEDIASTINAL SILHOUETTE: Cardiomediastinal silhouette is normal in size and configuration.   LUNGS: No pneumothorax, pleural effusion, or focal consolidation..   ABDOMEN: No remarkable upper abdominal findings.   BONES: No acute osseous changes.       1. No evidence of acute cardiopulmonary process.   I personally reviewed the images/study and I agree with the findings as stated by Gil Bonilla MD. This study was interpreted at [facility], [place], [place].   MACRO: None   Signed by: Tung Polanco 5/17/2025 3:23 PM Dictation workstation:   [id number]    Bedside PICC Imaging  Result Date: 5/16/2025  These images are not reportable by radiology and will not be interpreted by  Radiologists.          Assessment/Plan   Assessment & Plan  Sickle cell pain crisis (Multi)    Joellen Narayan is a 24 y.o. 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 Now admitted to [facility] for pain control.       # septic arthritis    -Recent admission for above; DC 5/16/25  - 5/8 Ortho consulted, s/p aspirate (>44k cells)   - 5/9 MRI w/anesthesia R radius/humerus showing concern for septic arthritis, osteomyelitis, myositis, severe muscle and subcutaneous soft tissue edema, and possible cellulitis   - s/p R elbow I&D 5/10 with ortho    - Tissue/ wound cultures NGTD, Fungal Cultures pending (NGTD 5/12)   - 5/12 final Ortho recs; WBAT RUE, Mepilex remove POD7 (5/17/25), drain removed 5/12, require 6 weeks total of DVT prophylaxis from an orthopedic standpoint, Calcium/Vitamin D 500mg-400IU BID for 6 weeks, follow up w/ Dr. Tello 2 weeks after surgery for post-operative appointment (scheduled 6/9)   - 5/13, final ID recs for long term antibiotics; IV daptomycin 6mg/kg q24h and ertapenem 1g q24h until 6/20/25-CONTINUE on admission-NEED ID consult in AM for further dosing           # Hgb SS disease    - OARRS reviewed on admission, no aberrancies noted    - Hgb baseline: 9-10, 8.7 on admission, (5/17)    -  (5/17); -400   - Bili 9.7 (5/17)   - WBC 13.1, Leukocytosis most likely I/s/o septic arthritis    - Start  4mg dilaudid q2h prn, lidocaine patch, flexeril as needed, tylenol    - c/w home folic acid daily   - utox pending    - bowel regimen for opioid induced constipation, zofran for opioid induced nausea, and benadryl for opioid induced pruritis             # Hx of nocturnal oxygen dependence   - On 2-3L at  night, however patient reports daytime hypoxia as well in the past, currently on 2L 5/17   - 5/17 Cxr w/o evidence of acute process           # hx choledocholithiasis 7/2024 -improved    - worsening hemolysis in setting of active infection could be contributing to increased hyperbilirubinemia    - July 2024 s/p ERCP with biliary sphincterotomy where sludge and stones were removed, achieving complete clearance    - 1/31/25 was planned for cholecystectomy with Dr. Dove but no show on day of surgery and again 2/13 and 2/27    - tbili baseline ~7-9, (5/17) 9.7 CTM     # Hx Priapism   - no active issues on admit    - hx previously drained by urology    - c/w home sudafed PRN     # Nodular lymphocyte predominant Hodgkins lymphoma (NLPHL)     - Follows with Dr. Stoll   - s/p Rituxan and prednisone q3 weeks (C1 1/18/24, C2 2/8/24, Missed C3 d/t sickle cell pain crisis, C4 4/4/24, C5 5/16/24, C6 6/7/24)    - 3/13 No show to onc appt    - 4/9 PET showed interval development of new FDG focus in mid abdomen   - Outpatient appointment scheduled 5/27          # prophy   - ASA BID    - SCDs, encouraged ambulation          # dispo   - Code Status: Full Code (confirmed on admission)    - DC home resumed O2 and home care with IV antibiotics pending improvement in pain    - NOK: Melissa, mother, [phone]    - FUV onc 5/27, pulm 5/21, ortho surg 6/9, ID 6/17 7/9 sickle cell clinic               I spent 60 minutes in the professional and overall care of this patient.      Lorri Belle, APRN-CNP         [1] aspirin, 81 mg, oral, BID  calcium carbonate-vitamin D3, 1 tablet, oral, BID  [START ON 5/18/2025] DAPTOmycin (Cubicin) 435 mg in sodium chloride 0.9% 50 mL IV, 6 mg/kg, intravenous, q24h  [START ON 5/18/2025] ertapenem, 1 g, intravenous, q24h  folic acid, 1 mg, oral, Daily  lidocaine, 2 patch, transdermal, Daily  polyethylene glycol, 17 g, oral, Daily  sennosides-docusate sodium, 2 tablet, oral, Nightly  [2]    [3] PRN  medications: diphenhydrAMINE, HYDROmorphone, ondansetron ODT **OR** ondansetron, oxygen

## 2025-05-18 ENCOUNTER — APPOINTMENT (OUTPATIENT)
Dept: RADIOLOGY | Facility: HOSPITAL | Age: 25
DRG: 812 | End: 2025-05-18
Payer: COMMERCIAL

## 2025-05-18 VITALS
HEIGHT: 74 IN | HEART RATE: 75 BPM | BODY MASS INDEX: 19.58 KG/M2 | OXYGEN SATURATION: 94 % | WEIGHT: 152.56 LBS | TEMPERATURE: 97.7 F | SYSTOLIC BLOOD PRESSURE: 114 MMHG | DIASTOLIC BLOOD PRESSURE: 70 MMHG | RESPIRATION RATE: 20 BRPM

## 2025-05-18 LAB
ALBUMIN SERPL BCP-MCNC: 3.8 G/DL (ref 3.4–5)
ALP SERPL-CCNC: 229 U/L (ref 33–120)
ALT SERPL W P-5'-P-CCNC: 72 U/L (ref 10–52)
ANION GAP SERPL CALC-SCNC: 12 MMOL/L (ref 10–20)
AST SERPL W P-5'-P-CCNC: 96 U/L (ref 9–39)
BASOPHILS # BLD AUTO: 0.06 X10*3/UL (ref 0–0.1)
BASOPHILS NFR BLD AUTO: 0.6 %
BILIRUB SERPL-MCNC: 8.3 MG/DL (ref 0–1.2)
BUN SERPL-MCNC: 10 MG/DL (ref 6–23)
CALCIUM SERPL-MCNC: 9 MG/DL (ref 8.6–10.6)
CHLORIDE SERPL-SCNC: 103 MMOL/L (ref 98–107)
CK SERPL-CCNC: 49 U/L (ref 0–325)
CO2 SERPL-SCNC: 28 MMOL/L (ref 21–32)
CREAT SERPL-MCNC: 0.61 MG/DL (ref 0.5–1.3)
CRP SERPL-MCNC: 2.96 MG/DL
EGFRCR SERPLBLD CKD-EPI 2021: >90 ML/MIN/1.73M*2
EOSINOPHIL # BLD AUTO: 0.51 X10*3/UL (ref 0–0.7)
EOSINOPHIL NFR BLD AUTO: 4.7 %
ERYTHROCYTE [DISTWIDTH] IN BLOOD BY AUTOMATED COUNT: 19.8 % (ref 11.5–14.5)
ERYTHROCYTE [SEDIMENTATION RATE] IN BLOOD BY WESTERGREN METHOD: 8 MM/H (ref 0–15)
FUNGUS SPEC CULT: NORMAL
FUNGUS SPEC FUNGUS STN: NORMAL
GLUCOSE SERPL-MCNC: 106 MG/DL (ref 74–99)
HCT VFR BLD AUTO: 21 % (ref 41–52)
HGB BLD-MCNC: 7.2 G/DL (ref 13.5–17.5)
HGB RETIC QN: 32 PG (ref 28–38)
IMM GRANULOCYTES # BLD AUTO: 0.13 X10*3/UL (ref 0–0.7)
IMM GRANULOCYTES NFR BLD AUTO: 1.2 % (ref 0–0.9)
IMMATURE RETIC FRACTION: 24.1 %
LDH SERPL L TO P-CCNC: 444 U/L (ref 84–246)
LYMPHOCYTES # BLD AUTO: 3.15 X10*3/UL (ref 1.2–4.8)
LYMPHOCYTES NFR BLD AUTO: 29.2 %
MCH RBC QN AUTO: 30.3 PG (ref 26–34)
MCHC RBC AUTO-ENTMCNC: 34.3 G/DL (ref 32–36)
MCV RBC AUTO: 88 FL (ref 80–100)
MONOCYTES # BLD AUTO: 1.54 X10*3/UL (ref 0.1–1)
MONOCYTES NFR BLD AUTO: 14.3 %
NEUTROPHILS # BLD AUTO: 5.4 X10*3/UL (ref 1.2–7.7)
NEUTROPHILS NFR BLD AUTO: 50 %
NRBC BLD-RTO: 1.6 /100 WBCS (ref 0–0)
PLATELET # BLD AUTO: 690 X10*3/UL (ref 150–450)
POTASSIUM SERPL-SCNC: 3.9 MMOL/L (ref 3.5–5.3)
PROT SERPL-MCNC: 6.3 G/DL (ref 6.4–8.2)
RBC # BLD AUTO: 2.38 X10*6/UL (ref 4.5–5.9)
RETICS #: 0.38 X10*6/UL (ref 0.02–0.12)
RETICS/RBC NFR AUTO: 15.9 % (ref 0.5–2)
SODIUM SERPL-SCNC: 139 MMOL/L (ref 136–145)
WBC # BLD AUTO: 10.8 X10*3/UL (ref 4.4–11.3)

## 2025-05-18 PROCEDURE — 73080 X-RAY EXAM OF ELBOW: CPT | Mod: RIGHT SIDE | Performed by: STUDENT IN AN ORGANIZED HEALTH CARE EDUCATION/TRAINING PROGRAM

## 2025-05-18 PROCEDURE — 85045 AUTOMATED RETICULOCYTE COUNT: CPT

## 2025-05-18 PROCEDURE — 82550 ASSAY OF CK (CPK): CPT | Performed by: NURSE PRACTITIONER

## 2025-05-18 PROCEDURE — 85025 COMPLETE CBC W/AUTO DIFF WBC: CPT

## 2025-05-18 PROCEDURE — 73080 X-RAY EXAM OF ELBOW: CPT | Mod: RT

## 2025-05-18 PROCEDURE — 2500000001 HC RX 250 WO HCPCS SELF ADMINISTERED DRUGS (ALT 637 FOR MEDICARE OP): Performed by: NURSE PRACTITIONER

## 2025-05-18 PROCEDURE — 86140 C-REACTIVE PROTEIN: CPT | Performed by: NURSE PRACTITIONER

## 2025-05-18 PROCEDURE — 99233 SBSQ HOSP IP/OBS HIGH 50: CPT | Performed by: HOSPITALIST

## 2025-05-18 PROCEDURE — 80053 COMPREHEN METABOLIC PANEL: CPT

## 2025-05-18 PROCEDURE — 99222 1ST HOSP IP/OBS MODERATE 55: CPT | Performed by: STUDENT IN AN ORGANIZED HEALTH CARE EDUCATION/TRAINING PROGRAM

## 2025-05-18 PROCEDURE — 2500000004 HC RX 250 GENERAL PHARMACY W/ HCPCS (ALT 636 FOR OP/ED): Mod: JZ,TB | Performed by: NURSE PRACTITIONER

## 2025-05-18 PROCEDURE — 83615 LACTATE (LD) (LDH) ENZYME: CPT

## 2025-05-18 PROCEDURE — 2500000001 HC RX 250 WO HCPCS SELF ADMINISTERED DRUGS (ALT 637 FOR MEDICARE OP)

## 2025-05-18 PROCEDURE — 85652 RBC SED RATE AUTOMATED: CPT | Performed by: NURSE PRACTITIONER

## 2025-05-18 PROCEDURE — 2500000005 HC RX 250 GENERAL PHARMACY W/O HCPCS

## 2025-05-18 PROCEDURE — 2500000004 HC RX 250 GENERAL PHARMACY W/ HCPCS (ALT 636 FOR OP/ED): Mod: JZ

## 2025-05-18 PROCEDURE — 1170000001 HC PRIVATE ONCOLOGY ROOM DAILY

## 2025-05-18 RX ORDER — PSEUDOEPHEDRINE HCL 30 MG
30 TABLET ORAL DAILY PRN
Status: DISCONTINUED | OUTPATIENT
Start: 2025-05-18 | End: 2025-05-26 | Stop reason: HOSPADM

## 2025-05-18 RX ORDER — AMOXICILLIN 250 MG
2 CAPSULE ORAL 2 TIMES DAILY
Status: DISCONTINUED | OUTPATIENT
Start: 2025-05-18 | End: 2025-05-26 | Stop reason: HOSPADM

## 2025-05-18 RX ORDER — HYDROMORPHONE HYDROCHLORIDE 4 MG/1
8 TABLET ORAL EVERY 4 HOURS PRN
Refills: 0 | Status: DISCONTINUED | OUTPATIENT
Start: 2025-05-18 | End: 2025-05-24

## 2025-05-18 RX ORDER — MORPHINE SULFATE 15 MG/1
15 TABLET, FILM COATED, EXTENDED RELEASE ORAL EVERY 12 HOURS SCHEDULED
Refills: 0 | Status: DISCONTINUED | OUTPATIENT
Start: 2025-05-18 | End: 2025-05-19

## 2025-05-18 RX ADMIN — LIDOCAINE 4% 2 PATCH: 40 PATCH TOPICAL at 19:03

## 2025-05-18 RX ADMIN — HYDROMORPHONE HYDROCHLORIDE 6 MG: 2 INJECTION, SOLUTION INTRAMUSCULAR; INTRAVENOUS; SUBCUTANEOUS at 17:16

## 2025-05-18 RX ADMIN — HYDROMORPHONE HYDROCHLORIDE 6 MG: 2 INJECTION, SOLUTION INTRAMUSCULAR; INTRAVENOUS; SUBCUTANEOUS at 13:01

## 2025-05-18 RX ADMIN — HYDROMORPHONE HYDROCHLORIDE 6 MG: 2 INJECTION, SOLUTION INTRAMUSCULAR; INTRAVENOUS; SUBCUTANEOUS at 15:43

## 2025-05-18 RX ADMIN — FOLIC ACID 1 MG: 1 TABLET ORAL at 09:13

## 2025-05-18 RX ADMIN — ASPIRIN 81 MG CHEWABLE TABLET 81 MG: 81 TABLET CHEWABLE at 21:09

## 2025-05-18 RX ADMIN — HYDROMORPHONE HYDROCHLORIDE 4 MG: 2 INJECTION, SOLUTION INTRAMUSCULAR; INTRAVENOUS; SUBCUTANEOUS at 06:41

## 2025-05-18 RX ADMIN — HYDROMORPHONE HYDROCHLORIDE 6 MG: 2 INJECTION, SOLUTION INTRAMUSCULAR; INTRAVENOUS; SUBCUTANEOUS at 11:14

## 2025-05-18 RX ADMIN — DAPTOMYCIN 435 MG: 500 INJECTION, POWDER, LYOPHILIZED, FOR SOLUTION INTRAVENOUS at 14:06

## 2025-05-18 RX ADMIN — HYDROMORPHONE HYDROCHLORIDE 4 MG: 2 INJECTION, SOLUTION INTRAMUSCULAR; INTRAVENOUS; SUBCUTANEOUS at 03:02

## 2025-05-18 RX ADMIN — HYDROMORPHONE HYDROCHLORIDE 6 MG: 2 INJECTION, SOLUTION INTRAMUSCULAR; INTRAVENOUS; SUBCUTANEOUS at 19:03

## 2025-05-18 RX ADMIN — Medication 1 TABLET: at 09:13

## 2025-05-18 RX ADMIN — Medication 1 TABLET: at 21:09

## 2025-05-18 RX ADMIN — ASPIRIN 81 MG CHEWABLE TABLET 81 MG: 81 TABLET CHEWABLE at 09:13

## 2025-05-18 RX ADMIN — PSEUDOEPHEDRINE HCL 30 MG: 30 TABLET, FILM COATED ORAL at 14:05

## 2025-05-18 RX ADMIN — HYDROMORPHONE HYDROCHLORIDE 6 MG: 2 INJECTION, SOLUTION INTRAMUSCULAR; INTRAVENOUS; SUBCUTANEOUS at 21:09

## 2025-05-18 RX ADMIN — HYDROMORPHONE HYDROCHLORIDE 6 MG: 2 INJECTION, SOLUTION INTRAMUSCULAR; INTRAVENOUS; SUBCUTANEOUS at 09:12

## 2025-05-18 RX ADMIN — ERTAPENEM SODIUM 1 G: 1 INJECTION INTRAMUSCULAR; INTRAVENOUS at 14:06

## 2025-05-18 RX ADMIN — MORPHINE SULFATE 15 MG: 15 TABLET, FILM COATED, EXTENDED RELEASE ORAL at 21:09

## 2025-05-18 RX ADMIN — MORPHINE SULFATE 15 MG: 15 TABLET, FILM COATED, EXTENDED RELEASE ORAL at 14:06

## 2025-05-18 ASSESSMENT — PAIN DESCRIPTION - ORIENTATION
ORIENTATION: RIGHT;UPPER;MID
ORIENTATION: UPPER;MID
ORIENTATION: RIGHT;MID

## 2025-05-18 ASSESSMENT — COGNITIVE AND FUNCTIONAL STATUS - GENERAL
DAILY ACTIVITIY SCORE: 24
MOBILITY SCORE: 24

## 2025-05-18 ASSESSMENT — PAIN SCALES - GENERAL
PAINLEVEL_OUTOF10: 9
PAINLEVEL_OUTOF10: 10 - WORST POSSIBLE PAIN
PAINLEVEL_OUTOF10: 8
PAINLEVEL_OUTOF10: 9
PAINLEVEL_OUTOF10: 10 - WORST POSSIBLE PAIN
PAINLEVEL_OUTOF10: 7
PAINLEVEL_OUTOF10: 8

## 2025-05-18 ASSESSMENT — PAIN DESCRIPTION - LOCATION
LOCATION: ARM
LOCATION: SHOULDER
LOCATION: ARM

## 2025-05-18 ASSESSMENT — PAIN - FUNCTIONAL ASSESSMENT
PAIN_FUNCTIONAL_ASSESSMENT: 0-10

## 2025-05-18 NOTE — CONSULTS
ORTHOPAEDIC SURGERY CONSULT NOTE     HPI:   Orthopaedic Problems/Injuries: Postop pain     24M (sickle cell anemia w recurrent pain crises, Hodgkin’s lymphoma s/p rituxan/prednisone not on current chemo, SA s/p I&D R elbow w Dr. Tello 5/10) p/w chest pain w concomitant elbow discomfort. Sx improved since prior to surgery. Ertapenem/Dapto. Denies numbness, tingling, and open wounds on the affected limb.      PMH: per HPI/EMR  PSH: per HPI/EMR  SocHx: Denies alcohol, tobacco, or illicit drug use   Ambulatory Status: Community ambulator without assistive devices   FamHx:  Non-contributory to this patient's acute orthopaedic problem other than as mentioned in HPI  Allergies:   Allergies  Reviewed by Will Moreland RN on 5/17/2025        Severity Reactions Comments    Cefepime Medium Hallucinations     Amoxicillin Low Hives     Ceftriaxone Low Hives, Rash           Medications: Denies home anticoagulation use   Current Outpatient Medications   Medication Instructions    aspirin 81 mg, oral, 2 times daily    calcium carbonate-vitamin D3 (Oscal-500) 500 mg-10 mcg (400 unit) tablet 1 tablet, oral, 2 times daily    ertapenem (INVANZ) 1 g, intravenous, Every 24 hours    folic acid (FOLVITE) 1 mg, oral, Daily    heparin flush (heparin LockFlush,Porcine,,PF,) 10 unit/mL injection 5 mL, intravenous, Daily, [facility]     lidocaine 4 % patch 2 patches, transdermal, Daily, Remove & discard patch within 12 hours or as directed by MD.    oxyCODONE (ROXICODONE) 20 mg, oral, Every 6 hours PRN    oxygen (O2) gas therapy 1 each, inhalation, Nightly    pseudoephedrine (SUDOGEST) 60 mg, oral, Every 8 hours PRN    sodium chloride 0.9% (Normal Saline Flush) flush 10 mL, intravenous, Daily, per SAS    sodium chloride 0.9% parenteral solution 50 mL with DAPTOmycin 50 mg/mL recon soln 435 mg 6 mg/kg, intravenous, Every 24 hours     ROS: 14 point ROS negative except as above    OBJECTIVE:  /80 (BP Location: Right arm, Patient Position:  "Lying)   Pulse 71   Temp 36 °C (96.8 °F) (Temporal)   Resp 18   Ht 1.88 m (6' 2\")   Wt 69.2 kg (152 lb 8.9 oz)   SpO2 96%   BMI 19.59 kg/m²     PHYSICAL EXAM    Gen: NAD  HEENT: normocephalic atraumatic  Psych: appropriate mood and affect  Resp: nonlabored breathing    Cardiac: extremities WWP    Neuro: alert and oriented   Skin: no rashes    MSK:  Right Upper Extremity:   -Incision cdi well approximated with nylons   - No pain with short arc ROM  -Fires in AIN/PIN/ulnar nerve distributions   -SILT in axillary/radial/median/ulnar distributions   -Hand warm, well perfused  -Palpable DP pulse   -Compartments soft and compressible     A full secondary exam was performed and all relevant findings discussed and noted above.    IMAGING:  X- and advanced imaging reveal the following injuries:   - XR wo osseous abnormality.      ASSESSMENT:   24M (sickle cell anemia w recurrent pain crises, Hodgkin’s lymphoma s/p rituxan/prednisone not on current chemo, SA s/p I&D R elbow w Dr. Tello 5/10) p/w chest pain w concomitant elbow discomfort. Sx improved since prior to surgery. Ertapenem/Dapto. Incision well approximated w nylon, no SA pain, NVI. ESR 8, CRP 2.96, WBC 10.8. Intraop cx NGTD. XR wo osseous abnormality.      PLAN:  - No acute orthopaedic intervention  - Weight bearing status: WBAT RUE   - Antibiotics: Per primary / ID  - Analgesia per ED/Primary  - F/U with Dr. Pillo Tello  in 1-2 weeks after discharge. Call [phone] to schedule appointment.  - Please don't hesitate to page with questions     DISPOSITION: Per primary     This patient was staffed with the attending physician, Dr. Pillo Dodd MD   Orthopedic Surgery PGY1  [facility]     While admitted, this patient will be followed by the Orthopedic Trauma Team. Please contact the residents listed below with any questions.    For urgent matters at any time, or for any needs between 6 PM and 6 AM Monday through Friday, on " weekends, or on holidays, please page the Orthopaedic Surgery resident [place] call at [phone].    Trauma:  First Call: Loree Pastrana, PGY-1  Second Call: Martin Melendez, PGY-2   Third Call: Herminio Valencia, PGY-3

## 2025-05-18 NOTE — PROGRESS NOTES
"Joellen Narayan is a 24 y.o. male on day 1 of admission presenting with Sickle cell pain crisis (Multi).    Subjective   Seen this AM at the bedside. Pt endorses severe right arm/elbow pain. Rates his pain as a 10/10. Denies any redness or worsening swelling to the right elbow. Also endorses some CP that is consistent with his typical sickle cell pain. We discussed increasing IV dilaudid. Also discussed plan to remove post-op dressing today. He denies any fevers/chills, SOB, n/v/d/c.       Objective     Physical Exam  Vitals reviewed.   Constitutional:       Appearance: Normal appearance.   HENT:      Head: Normocephalic and atraumatic.      Nose: Nose normal.      Mouth/Throat:      Mouth: Mucous membranes are moist.      Pharynx: Oropharynx is clear.   Eyes:      General: Scleral icterus present.      Extraocular Movements: Extraocular movements intact.      Pupils: Pupils are equal, round, and reactive to light.   Cardiovascular:      Rate and Rhythm: Normal rate and regular rhythm.      Pulses: Normal pulses.      Heart sounds: Normal heart sounds.   Pulmonary:      Effort: Pulmonary effort is normal.      Breath sounds: Normal breath sounds.   Abdominal:      General: Bowel sounds are normal.      Palpations: Abdomen is soft.   Musculoskeletal:         General: Normal range of motion.   Skin:     General: Skin is warm.      Comments: +R elbow dressing c/d/I   Neurological:      General: No focal deficit present.      Mental Status: He is alert and oriented to person, place, and time. Mental status is at baseline.   Psychiatric:         Mood and Affect: Mood normal.         Behavior: Behavior normal.       Last Recorded Vitals  Blood pressure 130/81, pulse 76, temperature 36.5 °C (97.7 °F), temperature source Temporal, resp. rate 18, height 1.88 m (6' 2\"), weight 69.2 kg (152 lb 8.9 oz), SpO2 96%.  Intake/Output last 3 Shifts:  No intake/output data recorded.    Results for orders placed or performed during the " hospital encounter of 05/17/25 (from the past 24 hours)   ECG 12 lead   Result Value Ref Range    Ventricular Rate 86 BPM    Atrial Rate 86 BPM    GA Interval 168 ms    QRS Duration 100 ms    QT Interval 370 ms    QTC Calculation(Bazett) 442 ms    P Axis 60 degrees    R Axis 64 degrees    T Axis 18 degrees    QRS Count 14 beats    Q Onset 216 ms    P Onset 132 ms    P Offset 184 ms    T Offset 401 ms    QTC Fredericia 417 ms   CBC and Auto Differential   Result Value Ref Range    WBC 13.1 (H) 4.4 - 11.3 x10*3/uL    nRBC 1.5 (H) 0.0 - 0.0 /100 WBCs    RBC 2.90 (L) 4.50 - 5.90 x10*6/uL    Hemoglobin 8.7 (L) 13.5 - 17.5 g/dL    Hematocrit 24.4 (L) 41.0 - 52.0 %    MCV 84 80 - 100 fL    MCH 30.0 26.0 - 34.0 pg    MCHC 35.7 32.0 - 36.0 g/dL    RDW 18.7 (H) 11.5 - 14.5 %    Platelets 799 (H) 150 - 450 x10*3/uL    Neutrophils % 77.7 40.0 - 80.0 %    Immature Granulocytes %, Automated 1.1 (H) 0.0 - 0.9 %    Lymphocytes % 11.6 13.0 - 44.0 %    Monocytes % 7.7 2.0 - 10.0 %    Eosinophils % 1.5 0.0 - 6.0 %    Basophils % 0.4 0.0 - 2.0 %    Neutrophils Absolute 10.16 (H) 1.20 - 7.70 x10*3/uL    Immature Granulocytes Absolute, Automated 0.14 0.00 - 0.70 x10*3/uL    Lymphocytes Absolute 1.51 1.20 - 4.80 x10*3/uL    Monocytes Absolute 1.00 0.10 - 1.00 x10*3/uL    Eosinophils Absolute 0.20 0.00 - 0.70 x10*3/uL    Basophils Absolute 0.05 0.00 - 0.10 x10*3/uL   Comprehensive metabolic panel   Result Value Ref Range    Glucose 93 74 - 99 mg/dL    Sodium 134 (L) 136 - 145 mmol/L    Potassium 4.1 3.5 - 5.3 mmol/L    Chloride 101 98 - 107 mmol/L    Bicarbonate 22 21 - 32 mmol/L    Anion Gap 15 10 - 20 mmol/L    Urea Nitrogen 8 6 - 23 mg/dL    Creatinine 0.56 0.50 - 1.30 mg/dL    eGFR >90 >60 mL/min/1.73m*2    Calcium 9.7 8.6 - 10.6 mg/dL    Albumin 4.6 3.4 - 5.0 g/dL    Alkaline Phosphatase 262 (H) 33 - 120 U/L    Total Protein 8.0 6.4 - 8.2 g/dL    AST 96 (H) 9 - 39 U/L    Bilirubin, Total 9.7 (H) 0.0 - 1.2 mg/dL    ALT 76 (H) 10 - 52  U/L   Magnesium   Result Value Ref Range    Magnesium 2.44 (H) 1.60 - 2.40 mg/dL   Reticulocytes   Result Value Ref Range    Retic % 16.5 (H) 0.5 - 2.0 %    Retic Absolute 0.480 (H) 0.022 - 0.118 x10*6/uL    Reticulocyte Hemoglobin 32 28 - 38 pg    Immature Retic fraction 23.0 (H) <=16.0 %   LDH, Lactate dehydrogenase   Result Value Ref Range     (H) 84 - 246 U/L   Troponin I, High Sensitivity, Initial   Result Value Ref Range    Troponin I, High Sensitivity (CMC) 4 0 - 53 ng/L   Troponin, High Sensitivity, 1 Hour   Result Value Ref Range    Troponin I, High Sensitivity (CMC) 4 0 - 53 ng/L   CBC and Auto Differential   Result Value Ref Range    WBC 10.8 4.4 - 11.3 x10*3/uL    nRBC 1.6 (H) 0.0 - 0.0 /100 WBCs    RBC 2.38 (L) 4.50 - 5.90 x10*6/uL    Hemoglobin 7.2 (L) 13.5 - 17.5 g/dL    Hematocrit 21.0 (L) 41.0 - 52.0 %    MCV 88 80 - 100 fL    MCH 30.3 26.0 - 34.0 pg    MCHC 34.3 32.0 - 36.0 g/dL    RDW 19.8 (H) 11.5 - 14.5 %    Platelets 690 (H) 150 - 450 x10*3/uL    Neutrophils % 50.0 40.0 - 80.0 %    Immature Granulocytes %, Automated 1.2 (H) 0.0 - 0.9 %    Lymphocytes % 29.2 13.0 - 44.0 %    Monocytes % 14.3 2.0 - 10.0 %    Eosinophils % 4.7 0.0 - 6.0 %    Basophils % 0.6 0.0 - 2.0 %    Neutrophils Absolute 5.40 1.20 - 7.70 x10*3/uL    Immature Granulocytes Absolute, Automated 0.13 0.00 - 0.70 x10*3/uL    Lymphocytes Absolute 3.15 1.20 - 4.80 x10*3/uL    Monocytes Absolute 1.54 (H) 0.10 - 1.00 x10*3/uL    Eosinophils Absolute 0.51 0.00 - 0.70 x10*3/uL    Basophils Absolute 0.06 0.00 - 0.10 x10*3/uL   Comprehensive Metabolic Panel   Result Value Ref Range    Glucose 106 (H) 74 - 99 mg/dL    Sodium 139 136 - 145 mmol/L    Potassium 3.9 3.5 - 5.3 mmol/L    Chloride 103 98 - 107 mmol/L    Bicarbonate 28 21 - 32 mmol/L    Anion Gap 12 10 - 20 mmol/L    Urea Nitrogen 10 6 - 23 mg/dL    Creatinine 0.61 0.50 - 1.30 mg/dL    eGFR >90 >60 mL/min/1.73m*2    Calcium 9.0 8.6 - 10.6 mg/dL    Albumin 3.8 3.4 - 5.0 g/dL     Alkaline Phosphatase 229 (H) 33 - 120 U/L    Total Protein 6.3 (L) 6.4 - 8.2 g/dL    AST 96 (H) 9 - 39 U/L    Bilirubin, Total 8.3 (H) 0.0 - 1.2 mg/dL    ALT 72 (H) 10 - 52 U/L   Lactate Dehydrogenase   Result Value Ref Range     (H) 84 - 246 U/L   Reticulocytes   Result Value Ref Range    Retic % 15.9 (H) 0.5 - 2.0 %    Retic Absolute 0.379 (H) 0.022 - 0.118 x10*6/uL    Reticulocyte Hemoglobin 32 28 - 38 pg    Immature Retic fraction 24.1 (H) <=16.0 %     *Note: Due to a large number of results and/or encounters for the requested time period, some results have not been displayed. A complete set of results can be found in Results Review.       Scheduled medications  Scheduled Medications[1]  Continuous medications  Continuous Medications[2]  PRN medications  PRN Medications[3]      Assessment & Plan  Sickle cell pain crisis (Multi)  Joellen Narayan is a 24 y.o. 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 Hgb and lysis labs stable at baseline. Pt admitted for further management of pain. Started on IV dilaudid per care plan. DC home with resumed O2 and home care with IV antibiotics pending improvement in pain.    Updates 5/18:  - Increased IV dilaudid from 4mg to 6mg q2hrs PRN severe pain  - Continuing IV Ertapenem and Dapto     # Septic Arthritis    - Recently admitted and discharged from hospital on 5/16 and treated for SA  - Ortho consulted 5/8, s/p  aspirate (>44k cells)   - MRI w/anesthesia (5/9) R radius/humerus showing concern for septic arthritis, osteomyelitis, myositis, severe muscle and subcutaneous soft tissue edema, and possible cellulitis   - s/p R elbow I&D 5/10 with ortho    - Tissue/ wound cultures NGTD, Fungal Cultures NGTD 5/12  - Final Ortho recs from 5/12; WBAT RUE, Mepilex remove POD7 (5/17/25), drain removed 5/12, require 6 weeks total of DVT prophylaxis from an orthopedic standpoint, Calcium/Vitamin D 500mg-400IU BID for 6 weeks (continued on admit), follow up w/ Dr. Tello 2 weeks after surgery for post-operative appointment (scheduled 6/9)   - Final ID recs for long term antibiotics on 5/13; IV daptomycin 6mg/kg q24h and ertapenem 1g q24h until 6/20/25--> continued on admission     # Hgb SS disease    - OARRS reviewed on admission, no aberrancies noted    - Hgb BL ~8; Hgb 7.2 (5/18)  - LDH BL ~500;  (5/18)  - Tbili BL ~8; Tbli 8.3 (5/18)  - s/p IV dilaudid 4mg q2hrs PRN severe pain (5/17)  - Increased IV dilaudid to 6mg q2hrs PRN severe pain (5/18-)  - Lidocaine patches x2  - Continue folic acid 1mg daily  - Utox (5/17): pending  - CXR (5/17): negative for acute process  - Bowel regimen for opioid-induced constipation with DocuSenna 2tabs BID and Miralax daily     # Hx of nocturnal oxygen dependence   - On 2-3L at night, however patient reports daytime hypoxia as well in the past, currently on 2L 5/17   - CXR (5/17): negative for acute process     # Hx choledocholithiasis 7/2024 -improved    - Worsening hemolysis in setting of active infection could be contributing to increased hyperbilirubinemia    - July 2024 s/p ERCP with biliary sphincterotomy where sludge and stones were removed, achieving complete clearance    - 1/31/25 was planned for cholecystectomy with Dr. Dove but no show on day of surgery and again 2/13 and 2/27    - Tbili stable     # Hx Priapism   - No active issues on admit    - Hx previously drained by urology    -  Continue home Sudafed 30mg daily PRN priapism     # Nodular lymphocyte predominant Hodgkins lymphoma (NLPHL)     - Follows with Dr. Stoll   - s/p Rituxan and prednisone q3 weeks (C1 1/18/24, C2 2/8/24, Missed C3 d/t sickle cell pain crisis, C4 4/4/24, C5 5/16/24, C6 6/7/24)    - 3/13 No show to onc appt    - 4/9 PET showed interval development of new FDG focus in mid abdomen   - Outpatient appointment scheduled 5/27       # Prophy   - ASA 81mg BID x6 weeks post-op (stop 6/21)  - SCDs, encouraged ambulation       DISPO:  - Code Status: Full Code (confirmed on admission)    - Access: RUE PICC  - DC home with resumed O2 and home care with IV antibiotics pending improvement in pain    - NOK: mother Torres, [phone]    - Pulm FUV 5/21, Onc FUV 5/27, Ortho FUV 6/9, ID FUV 6/17, Sickle Cell FUV 7/9    I spent >60 minutes in the professional and overall care of this patient    Assessment and plan as above discussed with attending physician Dr. Deanna Jenkins, APRN-CNP       [1] aspirin, 81 mg, oral, BID  calcium carbonate-vitamin D3, 1 tablet, oral, BID  DAPTOmycin (Cubicin) 435 mg in sodium chloride 0.9% 50 mL IV, 6 mg/kg, intravenous, q24h  ertapenem, 1 g, intravenous, q24h  folic acid, 1 mg, oral, Daily  lidocaine, 2 patch, transdermal, Daily  polyethylene glycol, 17 g, oral, Daily  sennosides-docusate sodium, 2 tablet, oral, BID  [2]    [3] PRN medications: diphenhydrAMINE, HYDROmorphone, ondansetron ODT **OR** ondansetron, oxygen

## 2025-05-18 NOTE — CARE PLAN
The clinical goals for the shift include Patient will remain safe and free from falls/injury with well-controlled pain during the shift.    Over the shift, the patient made progress toward the following goals:    Problem: Pain - Adult  Goal: Verbalizes/displays adequate comfort level or baseline comfort level  Outcome: Progressing     Problem: Safety - Adult  Goal: Free from fall injury  Outcome: Progressing     Problem: Discharge Planning  Goal: Discharge to home or other facility with appropriate resources  Outcome: Progressing     Problem: Chronic Conditions and Co-morbidities  Goal: Patient's chronic conditions and co-morbidity symptoms are monitored and maintained or improved  Outcome: Progressing     Problem: Nutrition  Goal: Nutrient intake appropriate for maintaining nutritional needs  Outcome: Progressing     Problem: Pain  Goal: Takes deep breaths with improved pain control throughout the shift  Outcome: Progressing  Goal: Turns in bed with improved pain control throughout the shift  Outcome: Progressing  Goal: Walks with improved pain control throughout the shift  Outcome: Progressing  Goal: Performs ADL's with improved pain control throughout shift  Outcome: Progressing  Goal: Participates in PT with improved pain control throughout the shift  Outcome: Progressing  Goal: Free from opioid side effects throughout the shift  Outcome: Progressing  Goal: Free from acute confusion related to pain meds throughout the shift  Outcome: Progressing

## 2025-05-18 NOTE — ASSESSMENT & PLAN NOTE
Joellen Narayan is a 24 y.o. 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 Hgb and lysis labs stable at baseline. Pt admitted for further management of pain. Started on IV dilaudid per care plan. DC home with resumed O2 and home care with IV antibiotics pending improvement in pain.    Updates 5/18:  - Increased IV dilaudid from 4mg to 6mg q2hrs PRN severe pain  - Continuing IV Ertapenem and Dapto     # Septic Arthritis    - Recently admitted and discharged from hospital on 5/16 and treated for SA  - Ortho consulted 5/8, s/p aspirate (>44k cells)   - MRI w/anesthesia (5/9) R radius/humerus showing concern for septic arthritis, osteomyelitis, myositis, severe muscle and subcutaneous soft tissue edema, and possible cellulitis   - s/p R elbow I&D 5/10 with ortho    - Tissue/ wound cultures NGTD, Fungal Cultures NGTD 5/12  - Final Ortho recs from 5/12; WBAT RUE, Mepilex remove POD7 (5/17/25), drain removed 5/12, require 6 weeks total of DVT prophylaxis from an orthopedic standpoint, Calcium/Vitamin D 500mg-400IU BID for 6 weeks (continued on admit), follow up w/ Dr. Tello 2 weeks after surgery for post-operative appointment (scheduled 6/9)   - Final ID recs for long term antibiotics on 5/13; IV daptomycin 6mg/kg q24h and ertapenem 1g q24h until 6/20/25--> continued on admission     # Hgb SS disease    - OARRS reviewed on admission, no aberrancies noted    - Hgb BL ~8;  Hgb 7.2 (5/18)  - LDH BL ~500;  (5/18)  - Tbili BL ~8; Tbli 8.3 (5/18)  - s/p IV dilaudid 4mg q2hrs PRN severe pain (5/17)  - Increased IV dilaudid to 6mg q2hrs PRN severe pain (5/18-)  - Lidocaine patches x2  - Continue folic acid 1mg daily  - Utox (5/17): pending  - CXR (5/17): negative for acute process  - Bowel regimen for opioid-induced constipation with DocuSenna 2tabs BID and Miralax daily     # Hx of nocturnal oxygen dependence   - On 2-3L at night, however patient reports daytime hypoxia as well in the past, currently on 2L 5/17   - CXR (5/17): negative for acute process     # Hx choledocholithiasis 7/2024 -improved    - Worsening hemolysis in setting of active infection could be contributing to increased hyperbilirubinemia    - July 2024 s/p ERCP with biliary sphincterotomy where sludge and stones were removed, achieving complete clearance    - 1/31/25 was planned for cholecystectomy with Dr. Dove but no show on day of surgery and again 2/13 and 2/27    - Tbili stable     # Hx Priapism   - No active issues on admit    - Hx previously drained by urology    - Continue home Sudafed 30mg daily PRN priapism     # Nodular lymphocyte predominant Hodgkins lymphoma (NLPHL)     - Follows with Dr. Stoll   - s/p Rituxan and prednisone q3 weeks (C1 1/18/24, C2 2/8/24, Missed C3 d/t sickle cell pain crisis, C4 4/4/24, C5 5/16/24, C6 6/7/24)    - 3/13 No show to onc appt    - 4/9 PET showed interval development of new FDG focus in mid abdomen   - Outpatient appointment scheduled 5/27       # Prophy   - ASA 81mg BID x6 weeks post-op (stop 6/21)  - SCDs, encouraged ambulation       DISPO:  - Code Status: Full Code (confirmed on admission)    - Access: RUE PICC  - DC home with resumed O2 and home care with IV antibiotics pending improvement in pain    - NOK: Melissa, mother, [phone]    - Pulm FUV 5/21, Onc FUV 5/27, Ortho FUV 6/9, ID FUV 6/17, Sickle Cell FUV 7/9

## 2025-05-18 NOTE — CONSULTS
"Inpatient consult to Infectious Diseases  Consult performed by: Ishaan Barros MD  Consult ordered by: Jenise Jenkins, APRN-CNP        Referred by Dr. Bren Alfredo MD: Ian Cruz MD    Reason For Consult  pt with septic arthritis previously seen by ID, readmitted. Consult just for dapto/ertapenem to be ordered per pharmacy     History Of Present Illness  Joellen Narayan is a 24 y.o. 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 Now representing for sickle cell management.    Patient was seen recetnly for hospitalization from 5/3-5/16. He was initially admitted for  sickle cell crisis. \"Joint aspirate 5/8: 44K WBC (95% PMN), 20K RBC, negative crystals, and culture NGTD. Started on vanc/Zosyn 5/8 at time of joint aspirate (only had 2 days of Zosyn prior otherwise 5/3-5/4).\" \"5/10: serosanguinous fluid (blood) in the elbow joint that was not frankly purulent; 2 cultures obtained with debridement and synovial biopsy; humerus debrided with culture/biopsies obtained.\" From. Dr. Altman note. Cultures at that time were negative for bacterial and fungal remains negative. Pathology remains pending. Patient was recommended for IV daptomycin 6mg/kg q24h and ertapenem 1g q24h     Patient notes that his elbow is still bothering him. He does note that his elbow and wrist are okay. Denies any fever, chills.    Past Medical History  He has a past medical history of Acute chest syndrome (Multi), Corrosion of unspecified body region, unspecified degree (12/31/2014), Impetigo (01/04/2024), Nicotine use disorder, Nodular lymphocyte predominant Hodgkin lymphoma, Non Hodgkin's lymphoma, Personal history of " diseases of the blood and blood-forming organs and certain disorders involving the immune mechanism, Personal history of other (healed) physical injury and trauma (2015), Personal history of other diseases of the circulatory system, Personal history of other diseases of the circulatory system, Priapism, Rash and other nonspecific skin eruption (2014), and Sickle cell disease (Multi).    Surgical History  He has a past surgical history that includes IR CVC tunneled (2022); CT guided percutaneous biopsy LYMPH node superficial (2022); and CT guided percutaneous abdominal retroperitoneum biopsy (2023).     Social History     Occupational History    Not on file   Tobacco Use    Smoking status: Every Day     Types: Cigars     Last attempt to quit:      Years since quittin.3     Passive exposure: Past    Smokeless tobacco: Never    Tobacco comments:      1 Black and mild daily for 3 years   Substance and Sexual Activity    Alcohol use: Not Currently     Comment: rarely    Drug use: Yes     Types: Marijuana    Sexual activity: Not Currently     Travel History   Travel since 25    No documented travel since 25              Family History  Family History[1]  Allergies  Cefepime, Amoxicillin, and Ceftriaxone     Immunization History   Administered Date(s) Administered    Hep A / Hep B 2009    Hepatitis A vaccine, pediatric/adolescent (HAVRIX, VAQTA) 2009    Meningococcal ACWY-D (Menactra) 4-valent conjugate vaccine 2018    Meningococcal B vaccine (BEXSERO) 2018, 2019    Meningococcal MPSV4 2002    Meningococcal, Unknown Serogroups 2002    Pneumococcal conjugate vaccine, 13-valent (PREVNAR 13) 2013    Pneumococcal polysaccharide vaccine, 23-valent, age 2 years and older (PNEUMOVAX 23) 2002     Medications  Home medications:  Prescriptions Prior to Admission[2]  Current medications:  Scheduled medications  Scheduled  "Medications[3]  Continuous medications  Continuous Medications[4]  PRN medications  PRN Medications[5]    Review of Systems     Review of systems otherwise negative    Objective  Range of Vitals (last 24 hours)  Heart Rate:  [73-91]   Temp:  [36 °C (96.8 °F)-37.5 °C (99.5 °F)]   Resp:  [16-18]   BP: (113-155)/(69-88)   Height:  [188 cm (6' 2\")]   Weight:  [69.2 kg (152 lb 8.9 oz)-74.8 kg (165 lb)]   SpO2:  [94 %-99 %]   Daily Weight  05/18/25 : 69.2 kg (152 lb 8.9 oz)    Body mass index is 19.59 kg/m².     Physical Exam   General: in no acute distress, able to answer questions  HEENT: no conjunctival injection. anicteric.  Abd: Soft and lax. ND.   Ext: right olecranon with stiches in place, no obvious drainage noted, wrist and shoulder without pain  Neuro: Answers questions appropriately.  Integumentary: no obvious lesions     Relevant Results    Labs  Results from last 72 hours   Lab Units 05/18/25  0309 05/17/25  1511 05/16/25  0734   WBC AUTO x10*3/uL 10.8 13.1* 12.2*   HEMOGLOBIN g/dL 7.2* 8.7* 8.3*   HEMATOCRIT % 21.0* 24.4* 24.1*   PLATELETS AUTO x10*3/uL 690* 799* 665*   NEUTROS PCT AUTO % 50.0 77.7 51.4   LYMPHS PCT AUTO % 29.2 11.6 22.4   MONOS PCT AUTO % 14.3 7.7 14.1   EOS PCT AUTO % 4.7 1.5 9.4     Results from last 72 hours   Lab Units 05/18/25  0309 05/17/25  1511 05/16/25  0734   SODIUM mmol/L 139 134* 135*   POTASSIUM mmol/L 3.9 4.1 3.8   CHLORIDE mmol/L 103 101 97*   CO2 mmol/L 28 22 29   BUN mg/dL 10 8 9   CREATININE mg/dL 0.61 0.56 0.59   GLUCOSE mg/dL 106* 93 77   CALCIUM mg/dL 9.0 9.7 9.6   ANION GAP mmol/L 12 15 13   EGFR mL/min/1.73m*2 >90 >90 >90     Results from last 72 hours   Lab Units 05/18/25  0309 05/17/25  1511 05/16/25  0734   ALK PHOS U/L 229* 262* 241*   BILIRUBIN TOTAL mg/dL 8.3* 9.7* 8.7*   PROTEIN TOTAL g/dL 6.3* 8.0 7.0   ALT U/L 72* 76* 59*   AST U/L 96* 96* 81*   ALBUMIN g/dL 3.8 4.6 3.9     Estimated Creatinine Clearance: 125 mL/min (by C-G formula based on SCr of 0.61 " mg/dL).  C-Reactive Protein   Date Value Ref Range Status   05/13/2025 10.56 (H) <1.00 mg/dL Final   05/08/2025 16.01 (H) <1.00 mg/dL Final   05/06/2025 6.15 (H) <1.00 mg/dL Final     Sedimentation Rate   Date Value Ref Range Status   05/14/2025 24 (H) 0 - 15 mm/h Final   05/12/2025 5 0 - 15 mm/h Final   05/08/2025 2 0 - 15 mm/h Final     HIV 1 and 2 Screen   Date Value Ref Range Status   12/03/2022 NONREACTIVE NONREACTIVE Final     Comment:      HIV Ag/Ab screen is performed using the Siemens AgSquared   HIV Ag/Ab Combo assay which detects the presence of HIV    p24 antigen as well as antibodies to HIV-1   (Group M and O) and HIV-2.  .  No laboratory evidence of HIV infection. If acute HIV infection is   suspected, consider testing for HIV RNA by PCR (viral load).       Hepatitis C AB   Date Value Ref Range Status   07/13/2024 Nonreactive Nonreactive Final     Comment:     Results from patients taking biotin supplements or receiving high-dose biotin therapy should be interpreted with caution due to possible interference with this test. Providers may contact their local laboratory for further information.     HCV PCR Quant   Date Value Ref Range Status   11/16/2022 NOT DETECTED IU/mL Final     Comment:     REF VALUE  NOT DETECTED       Microbiology  5/10 OR cultures with no growth  5/10 fungal cultures NGTD  5/0 pathology pending    Imaging    CXR  IMPRESSION:  1. No evidence of acute cardiopulmonary process.     Assessment/Plan     Joellen Narayan is a 24 y.o. 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 Now representing for sickle cell management.    #Concern for septic arthritis, s/p washout on 5/10  on daptomycin and ertapenem  #Humerus OM concern on MRI  Patient with continued pain on the elbow. Previously had washout on 5/10 with cultures negative. He has no obvious wound dehiscence at this time and no obvious drainage of the area.     -Please obtain CRP   -Please obtain weekly CK  -Please obtain xray of right elbow  -Consider orthopedic evaluation for assessment of post operative site in the setting of continued pain  -Okay for continued daptomycin 6mg/kg every 24 hours  and ertapenem 1 g q 24 hours while in the hospital.     ID will continue to follow. If any questions regarding this patient please mojgan Barros  Infectious Diseases  Team A       [1]   Family History  Problem Relation Name Age of Onset    Sickle cell trait Mother      Diabetes Mother      Sickle cell trait Father      Lung cancer Brother     [2]   Medications Prior to Admission   Medication Sig Dispense Refill Last Dose/Taking    [] alteplase (Cathflo Activase) 2 mg injection 2 mL (2 mg) by intra-catheter route 1 time for 1 dose. 1 each 0     aspirin 81 mg chewable tablet Chew 1 tablet (81 mg) 2 times a day. 84 tablet 0 Unknown    calcium carbonate-vitamin D3 (Oscal-500) 500 mg-10 mcg (400 unit) tablet Take 1 tablet by mouth 2 times a day. 84 tablet 0 Unknown    ertapenem (INVanz) IV Infuse 50 mL (1 g) over 30 minutes into a venous catheter once every 24 hours. 1900 mL 1 Unknown    folic acid (Folvite) 1 mg tablet Take 1 tablet (1 mg) by mouth once daily. 90 tablet 0 Unknown    heparin flush (heparin LockFlush,Porcine,,PF,) 10 unit/mL injection Infuse 5 mL (50 Units) into a venous catheter once daily. SASH   Unknown    lidocaine 4 % patch Place 2 patches over 12 hours on the skin once daily for 7 days. Remove & discard patch within 12 hours or as directed by MD. 15 patch 1 Unknown    oxyCODONE (Roxicodone) 20 mg immediate release tablet Take 1 tablet (20 mg) by mouth every 6 hours if needed for severe pain (7 - 10) for up  to 7 days. 28 tablet 0 Unknown    oxygen (O2) gas therapy Inhale 1 each once daily at bedtime.   Unknown    pseudoephedrine (Sudogest) 60 mg tablet Take 1 tablet (60 mg) by mouth every 8 hours if needed for congestion for up to 10 days. (Patient not taking: Reported on 5/3/2025) 30 tablet 0     sodium chloride 0.9% (Normal Saline Flush) flush Infuse 10 mL into a venous catheter once daily. per [facility]   Unknown    sodium chloride 0.9% parenteral solution 50 mL with DAPTOmycin 50 mg/mL recon soln 435 mg Infuse 435 mg at 117.4 mL/hr over 30 minutes into a venous catheter once every 24 hours. 72579 mg 0 Unknown   [3] aspirin, 81 mg, oral, BID  calcium carbonate-vitamin D3, 1 tablet, oral, BID  DAPTOmycin (Cubicin) 435 mg in sodium chloride 0.9% 50 mL IV, 6 mg/kg, intravenous, q24h  ertapenem, 1 g, intravenous, q24h  folic acid, 1 mg, oral, Daily  lidocaine, 2 patch, transdermal, Daily  polyethylene glycol, 17 g, oral, Daily  sennosides-docusate sodium, 2 tablet, oral, BID    [4]    [5] PRN medications: diphenhydrAMINE, HYDROmorphone, ondansetron ODT **OR** ondansetron, oxygen

## 2025-05-19 LAB
ALBUMIN SERPL BCP-MCNC: 3.9 G/DL (ref 3.4–5)
ALP SERPL-CCNC: 207 U/L (ref 33–120)
ALT SERPL W P-5'-P-CCNC: 75 U/L (ref 10–52)
ANION GAP SERPL CALC-SCNC: 13 MMOL/L (ref 10–20)
AST SERPL W P-5'-P-CCNC: 101 U/L (ref 9–39)
BASOPHILS # BLD AUTO: 0.07 X10*3/UL (ref 0–0.1)
BASOPHILS NFR BLD AUTO: 0.7 %
BILIRUB SERPL-MCNC: 7.9 MG/DL (ref 0–1.2)
BUN SERPL-MCNC: 9 MG/DL (ref 6–23)
CALCIUM SERPL-MCNC: 9.8 MG/DL (ref 8.6–10.6)
CHLORIDE SERPL-SCNC: 100 MMOL/L (ref 98–107)
CO2 SERPL-SCNC: 25 MMOL/L (ref 21–32)
CREAT SERPL-MCNC: 0.47 MG/DL (ref 0.5–1.3)
EGFRCR SERPLBLD CKD-EPI 2021: >90 ML/MIN/1.73M*2
EOSINOPHIL # BLD AUTO: 0.8 X10*3/UL (ref 0–0.7)
EOSINOPHIL NFR BLD AUTO: 8 %
ERYTHROCYTE [DISTWIDTH] IN BLOOD BY AUTOMATED COUNT: 20.2 % (ref 11.5–14.5)
GLUCOSE SERPL-MCNC: 85 MG/DL (ref 74–99)
HCT VFR BLD AUTO: 23.2 % (ref 41–52)
HEMOGLOBIN A2: 2.9 % (ref 2–3.5)
HEMOGLOBIN A: 44.5 % (ref 95.8–98)
HEMOGLOBIN F: 2.7 % (ref 0–2)
HEMOGLOBIN IDENTIFICATION INTERPRETATION: ABNORMAL
HEMOGLOBIN S: 49.9 %
HGB BLD-MCNC: 7.6 G/DL (ref 13.5–17.5)
HGB RETIC QN: 32 PG (ref 28–38)
IMM GRANULOCYTES # BLD AUTO: 0.07 X10*3/UL (ref 0–0.7)
IMM GRANULOCYTES NFR BLD AUTO: 0.7 % (ref 0–0.9)
IMMATURE RETIC FRACTION: 18.1 %
LDH SERPL L TO P-CCNC: 451 U/L (ref 84–246)
LYMPHOCYTES # BLD AUTO: 3.09 X10*3/UL (ref 1.2–4.8)
LYMPHOCYTES NFR BLD AUTO: 30.8 %
MCH RBC QN AUTO: 29.2 PG (ref 26–34)
MCHC RBC AUTO-ENTMCNC: 32.8 G/DL (ref 32–36)
MCV RBC AUTO: 89 FL (ref 80–100)
MONOCYTES # BLD AUTO: 1.38 X10*3/UL (ref 0.1–1)
MONOCYTES NFR BLD AUTO: 13.7 %
NEUTROPHILS # BLD AUTO: 4.63 X10*3/UL (ref 1.2–7.7)
NEUTROPHILS NFR BLD AUTO: 46.1 %
NRBC BLD-RTO: 1.2 /100 WBCS (ref 0–0)
PATH REVIEW-HGB IDENTIFICATION: ABNORMAL
PLATELET # BLD AUTO: 773 X10*3/UL (ref 150–450)
POTASSIUM SERPL-SCNC: 4.5 MMOL/L (ref 3.5–5.3)
PROT SERPL-MCNC: 6.5 G/DL (ref 6.4–8.2)
RBC # BLD AUTO: 2.6 X10*6/UL (ref 4.5–5.9)
RETICS #: 0.46 X10*6/UL (ref 0.02–0.12)
RETICS/RBC NFR AUTO: 17.5 % (ref 0.5–2)
SODIUM SERPL-SCNC: 133 MMOL/L (ref 136–145)
WBC # BLD AUTO: 10 X10*3/UL (ref 4.4–11.3)

## 2025-05-19 PROCEDURE — 83615 LACTATE (LD) (LDH) ENZYME: CPT

## 2025-05-19 PROCEDURE — 2500000004 HC RX 250 GENERAL PHARMACY W/ HCPCS (ALT 636 FOR OP/ED): Mod: JZ,TB | Performed by: NURSE PRACTITIONER

## 2025-05-19 PROCEDURE — 2500000001 HC RX 250 WO HCPCS SELF ADMINISTERED DRUGS (ALT 637 FOR MEDICARE OP)

## 2025-05-19 PROCEDURE — 2500000001 HC RX 250 WO HCPCS SELF ADMINISTERED DRUGS (ALT 637 FOR MEDICARE OP): Performed by: NURSE PRACTITIONER

## 2025-05-19 PROCEDURE — 85045 AUTOMATED RETICULOCYTE COUNT: CPT

## 2025-05-19 PROCEDURE — 1170000001 HC PRIVATE ONCOLOGY ROOM DAILY

## 2025-05-19 PROCEDURE — 2500000004 HC RX 250 GENERAL PHARMACY W/ HCPCS (ALT 636 FOR OP/ED): Mod: JW

## 2025-05-19 PROCEDURE — 2500000005 HC RX 250 GENERAL PHARMACY W/O HCPCS

## 2025-05-19 PROCEDURE — 85025 COMPLETE CBC W/AUTO DIFF WBC: CPT

## 2025-05-19 PROCEDURE — 80053 COMPREHEN METABOLIC PANEL: CPT

## 2025-05-19 PROCEDURE — 99233 SBSQ HOSP IP/OBS HIGH 50: CPT | Performed by: NURSE PRACTITIONER

## 2025-05-19 RX ORDER — OXYCODONE HCL 20 MG/1
20 TABLET, FILM COATED, EXTENDED RELEASE ORAL EVERY 12 HOURS SCHEDULED
Refills: 0 | Status: DISCONTINUED | OUTPATIENT
Start: 2025-05-19 | End: 2025-05-20

## 2025-05-19 RX ORDER — OXYCODONE HCL 20 MG/1
20 TABLET, FILM COATED, EXTENDED RELEASE ORAL EVERY 12 HOURS
Qty: 14 TABLET | Refills: 0 | Status: SHIPPED | OUTPATIENT
Start: 2025-05-19 | End: 2025-05-21 | Stop reason: HOSPADM

## 2025-05-19 RX ADMIN — HYDROMORPHONE HYDROCHLORIDE 8 MG: 4 TABLET ORAL at 20:55

## 2025-05-19 RX ADMIN — HYDROMORPHONE HYDROCHLORIDE 6 MG: 2 INJECTION, SOLUTION INTRAMUSCULAR; INTRAVENOUS; SUBCUTANEOUS at 16:37

## 2025-05-19 RX ADMIN — ASPIRIN 81 MG CHEWABLE TABLET 81 MG: 81 TABLET CHEWABLE at 20:49

## 2025-05-19 RX ADMIN — Medication 1 TABLET: at 08:14

## 2025-05-19 RX ADMIN — HYDROMORPHONE HYDROCHLORIDE 6 MG: 2 INJECTION, SOLUTION INTRAMUSCULAR; INTRAVENOUS; SUBCUTANEOUS at 23:06

## 2025-05-19 RX ADMIN — HYDROMORPHONE HYDROCHLORIDE 6 MG: 2 INJECTION, SOLUTION INTRAMUSCULAR; INTRAVENOUS; SUBCUTANEOUS at 08:13

## 2025-05-19 RX ADMIN — HYDROMORPHONE HYDROCHLORIDE 6 MG: 2 INJECTION, SOLUTION INTRAMUSCULAR; INTRAVENOUS; SUBCUTANEOUS at 05:10

## 2025-05-19 RX ADMIN — HYDROMORPHONE HYDROCHLORIDE 6 MG: 2 INJECTION, SOLUTION INTRAMUSCULAR; INTRAVENOUS; SUBCUTANEOUS at 10:53

## 2025-05-19 RX ADMIN — HYDROMORPHONE HYDROCHLORIDE 6 MG: 2 INJECTION, SOLUTION INTRAMUSCULAR; INTRAVENOUS; SUBCUTANEOUS at 18:55

## 2025-05-19 RX ADMIN — MORPHINE SULFATE 15 MG: 15 TABLET, FILM COATED, EXTENDED RELEASE ORAL at 08:14

## 2025-05-19 RX ADMIN — Medication 1 TABLET: at 20:49

## 2025-05-19 RX ADMIN — HYDROMORPHONE HYDROCHLORIDE 8 MG: 4 TABLET ORAL at 14:24

## 2025-05-19 RX ADMIN — POLYETHYLENE GLYCOL 3350 17 G: 17 POWDER, FOR SOLUTION ORAL at 08:13

## 2025-05-19 RX ADMIN — SENNOSIDES AND DOCUSATE SODIUM 2 TABLET: 50; 8.6 TABLET ORAL at 08:14

## 2025-05-19 RX ADMIN — DAPTOMYCIN 435 MG: 500 INJECTION, POWDER, LYOPHILIZED, FOR SOLUTION INTRAVENOUS at 14:27

## 2025-05-19 RX ADMIN — ERTAPENEM SODIUM 1 G: 1 INJECTION INTRAMUSCULAR; INTRAVENOUS at 15:26

## 2025-05-19 RX ADMIN — HYDROMORPHONE HYDROCHLORIDE 6 MG: 2 INJECTION, SOLUTION INTRAMUSCULAR; INTRAVENOUS; SUBCUTANEOUS at 00:05

## 2025-05-19 RX ADMIN — FOLIC ACID 1 MG: 1 TABLET ORAL at 08:14

## 2025-05-19 RX ADMIN — OXYCODONE HYDROCHLORIDE 20 MG: 20 TABLET, FILM COATED, EXTENDED RELEASE ORAL at 20:49

## 2025-05-19 RX ADMIN — HYDROMORPHONE HYDROCHLORIDE 6 MG: 2 INJECTION, SOLUTION INTRAMUSCULAR; INTRAVENOUS; SUBCUTANEOUS at 02:05

## 2025-05-19 RX ADMIN — SENNOSIDES AND DOCUSATE SODIUM 2 TABLET: 50; 8.6 TABLET ORAL at 20:49

## 2025-05-19 RX ADMIN — LIDOCAINE 4% 2 PATCH: 40 PATCH TOPICAL at 18:55

## 2025-05-19 RX ADMIN — ASPIRIN 81 MG CHEWABLE TABLET 81 MG: 81 TABLET CHEWABLE at 08:14

## 2025-05-19 RX ADMIN — HYDROMORPHONE HYDROCHLORIDE 8 MG: 4 TABLET ORAL at 05:48

## 2025-05-19 RX ADMIN — HYDROMORPHONE HYDROCHLORIDE 6 MG: 2 INJECTION, SOLUTION INTRAMUSCULAR; INTRAVENOUS; SUBCUTANEOUS at 12:57

## 2025-05-19 ASSESSMENT — PAIN SCALES - GENERAL
PAINLEVEL_OUTOF10: 10 - WORST POSSIBLE PAIN
PAINLEVEL_OUTOF10: 7
PAINLEVEL_OUTOF10: 9
PAINLEVEL_OUTOF10: 10 - WORST POSSIBLE PAIN
PAINLEVEL_OUTOF10: 9
PAINLEVEL_OUTOF10: 10 - WORST POSSIBLE PAIN
PAINLEVEL_OUTOF10: 9
PAINLEVEL_OUTOF10: 10 - WORST POSSIBLE PAIN
PAINLEVEL_OUTOF10: 9
PAINLEVEL_OUTOF10: 8
PAINLEVEL_OUTOF10: 9
PAINLEVEL_OUTOF10: 10 - WORST POSSIBLE PAIN
PAINLEVEL_OUTOF10: 9
PAINLEVEL_OUTOF10: 7
PAINLEVEL_OUTOF10: 10 - WORST POSSIBLE PAIN
PAINLEVEL_OUTOF10: 8

## 2025-05-19 ASSESSMENT — COGNITIVE AND FUNCTIONAL STATUS - GENERAL
MOBILITY SCORE: 24
DAILY ACTIVITIY SCORE: 24

## 2025-05-19 ASSESSMENT — PAIN DESCRIPTION - LOCATION
LOCATION: ARM

## 2025-05-19 ASSESSMENT — PAIN - FUNCTIONAL ASSESSMENT

## 2025-05-19 ASSESSMENT — PAIN DESCRIPTION - ORIENTATION
ORIENTATION: RIGHT;UPPER;MID
ORIENTATION: RIGHT;UPPER;MID
ORIENTATION: RIGHT
ORIENTATION: RIGHT;MID;UPPER
ORIENTATION: RIGHT
ORIENTATION: RIGHT
ORIENTATION: RIGHT;MID;UPPER
ORIENTATION: RIGHT
ORIENTATION: RIGHT;MID;UPPER
ORIENTATION: RIGHT

## 2025-05-19 ASSESSMENT — ACTIVITIES OF DAILY LIVING (ADL): LACK_OF_TRANSPORTATION: NO

## 2025-05-19 NOTE — PROGRESS NOTES
"Joellen Narayan is a 24 y.o. male on day 2 of admission presenting with Sickle cell pain crisis (Multi).    Subjective   Seen this AM at the bedside. Pt reports pain in his right elbow still. States the pain meds are helping but that they wear off quickly. We discussed plan to incorportate PO dilaudid into pain regimen and also discussed Oxycontin. Pt agreeable to plan. CP stable. He denies any fevers/chills, SOB, n/v/d/c, or abdominal pain.    Objective     Physical Exam  Vitals reviewed.   Constitutional:       Appearance: Normal appearance.   HENT:      Head: Normocephalic and atraumatic.      Nose: Nose normal.      Mouth/Throat:      Mouth: Mucous membranes are moist.      Pharynx: Oropharynx is clear.   Eyes:      General: Scleral icterus present.      Extraocular Movements: Extraocular movements intact.      Pupils: Pupils are equal, round, and reactive to light.   Cardiovascular:      Rate and Rhythm: Normal rate and regular rhythm.      Pulses: Normal pulses.      Heart sounds: Normal heart sounds.   Pulmonary:      Effort: Pulmonary effort is normal.      Breath sounds: Normal breath sounds.   Abdominal:      General: Bowel sounds are normal.      Palpations: Abdomen is soft.   Musculoskeletal:         General: Normal range of motion.   Skin:     General: Skin is warm.      Comments: +R elbow dressing c/d/I   Neurological:      General: No focal deficit present.      Mental Status: He is alert and oriented to person, place, and time. Mental status is at baseline.   Psychiatric:         Mood and Affect: Mood normal.         Behavior: Behavior normal.       Last Recorded Vitals  Blood pressure 118/67, pulse 75, temperature 36.5 °C (97.7 °F), resp. rate 18, height 1.88 m (6' 2\"), weight 69.2 kg (152 lb 8.9 oz), SpO2 96%.  Intake/Output last 3 Shifts:  I/O last 3 completed shifts:  In: 0 (0 mL/kg)   Out: 1975 (28.5 mL/kg) [Urine:1975 (0.8 mL/kg/hr)]  Weight: 69.2 kg     Results for orders placed or performed " during the hospital encounter of 05/17/25 (from the past 24 hours)   CBC and Auto Differential   Result Value Ref Range    WBC 10.0 4.4 - 11.3 x10*3/uL    nRBC 1.2 (H) 0.0 - 0.0 /100 WBCs    RBC 2.60 (L) 4.50 - 5.90 x10*6/uL    Hemoglobin 7.6 (L) 13.5 - 17.5 g/dL    Hematocrit 23.2 (L) 41.0 - 52.0 %    MCV 89 80 - 100 fL    MCH 29.2 26.0 - 34.0 pg    MCHC 32.8 32.0 - 36.0 g/dL    RDW 20.2 (H) 11.5 - 14.5 %    Platelets 773 (H) 150 - 450 x10*3/uL    Neutrophils % 46.1 40.0 - 80.0 %    Immature Granulocytes %, Automated 0.7 0.0 - 0.9 %    Lymphocytes % 30.8 13.0 - 44.0 %    Monocytes % 13.7 2.0 - 10.0 %    Eosinophils % 8.0 0.0 - 6.0 %    Basophils % 0.7 0.0 - 2.0 %    Neutrophils Absolute 4.63 1.20 - 7.70 x10*3/uL    Immature Granulocytes Absolute, Automated 0.07 0.00 - 0.70 x10*3/uL    Lymphocytes Absolute 3.09 1.20 - 4.80 x10*3/uL    Monocytes Absolute 1.38 (H) 0.10 - 1.00 x10*3/uL    Eosinophils Absolute 0.80 (H) 0.00 - 0.70 x10*3/uL    Basophils Absolute 0.07 0.00 - 0.10 x10*3/uL   Comprehensive Metabolic Panel   Result Value Ref Range    Glucose 85 74 - 99 mg/dL    Sodium 133 (L) 136 - 145 mmol/L    Potassium 4.5 3.5 - 5.3 mmol/L    Chloride 100 98 - 107 mmol/L    Bicarbonate 25 21 - 32 mmol/L    Anion Gap 13 10 - 20 mmol/L    Urea Nitrogen 9 6 - 23 mg/dL    Creatinine 0.47 (L) 0.50 - 1.30 mg/dL    eGFR >90 >60 mL/min/1.73m*2    Calcium 9.8 8.6 - 10.6 mg/dL    Albumin 3.9 3.4 - 5.0 g/dL    Alkaline Phosphatase 207 (H) 33 - 120 U/L    Total Protein 6.5 6.4 - 8.2 g/dL     (H) 9 - 39 U/L    Bilirubin, Total 7.9 (H) 0.0 - 1.2 mg/dL    ALT 75 (H) 10 - 52 U/L   Lactate Dehydrogenase   Result Value Ref Range     (H) 84 - 246 U/L   Reticulocytes   Result Value Ref Range    Retic % 17.5 (H) 0.5 - 2.0 %    Retic Absolute 0.455 (H) 0.022 - 0.118 x10*6/uL    Reticulocyte Hemoglobin 32 28 - 38 pg    Immature Retic fraction 18.1 (H) <=16.0 %     *Note: Due to a large number of results and/or encounters for the  requested time period, some results have not been displayed. A complete set of results can be found in Results Review.       Scheduled medications  Scheduled Medications[1]  Continuous medications  Continuous Medications[2]  PRN medications  PRN Medications[3]      Assessment & Plan  Sickle cell pain crisis (Multi)  Joellen Narayan is a 24 y.o. 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 Hgb and lysis labs stable at baseline. Pt admitted for further management of pain. Started on IV dilaudid per care plan. ID consulted for atb ordering, rec Xray R elbow and ortho consult. Xray R elbow (5/18) showing postsurgical changes of the elbow with large joint effusion and soft tissue edema. Ortho consulted 5/18, rec no acute ortho intervention. Added oral dilaudid into pain regimen 5/19 in attempt to wean IV dilaudid. DC home with resumed O2 and home care with IV antibiotics pending improvement in pain.    Updates 5/19:  - Started Oxycontin 20mg BID (PA approved)  - Added oral dilaudid into pain regimen 5/19 in attempt to wean IV dilaudid     # Septic Arthritis    - Recently admitted and discharged from hospital on 5/16 and treated for SA  - Ortho consulted 5/8, s/p aspirate (>44k cells)   - MRI w/anesthesia (5/9) R radius/humerus showing concern for septic arthritis, osteomyelitis, myositis, severe muscle and subcutaneous soft tissue edema, and possible cellulitis   - s/p R  elbow I&D 5/10 with ortho    - Tissue/ wound cultures NGTD, Fungal Cultures NGTD 5/12  - Final Ortho recs from 5/12; WBAT RUE, Mepilex remove POD7 (5/17/25), drain removed 5/12, require 6 weeks total of DVT prophylaxis from an orthopedic standpoint, Calcium/Vitamin D 500mg-400IU BID for 6 weeks (continued on admit), follow up w/ Dr. Tello 2 weeks after surgery for post-operative appointment (scheduled 6/9)   - Final ID recs for long term antibiotics on 5/13; IV daptomycin 6mg/kg q24h and ertapenem 1g q24h until 6/20/25--> continued on admission  - ID consulted 5/18 for atb ordering, rec Xray R elbow and ortho consult  - Xray R elbow (5/18) showing postsurgical changes of the elbow with large joint effusion and soft tissue edema  - Ortho consulted 5/18, rec no acute ortho intervention  - CRP 2.96, ESR 8, CK 49 (5/18)     # Hgb SS disease    # Post-surgical pain  - OARRS reviewed on admission, no aberrancies noted    - Hgb BL ~8; Hgb 7.2 (5/18)--> 7.6 (5/19)  - LDH BL ~500;  (5/18)--> 451 (5/19)  - Tbili BL ~8; Tbli 8.3 (5/18)--> 7.9 (5/19)  - Pain is likely post-surgical + sickle cell pain; when pt was dc on 5/16 his MS contin wasn't approved with PA and he didn't want to remain admitted (also was discharged right off IV dilaudid)--> so current pain is likely withdrawal-related  - s/p IV dilaudid 4mg q2hrs PRN severe pain (5/17)  - Increased IV dilaudid to 6mg q2hrs PRN severe pain (5/18-)  - Started Oxycontin 20mg BID (5/19); PA approved  - Added PO dilaudid 8mg q4hrs PRN breakthrough pain- ordered specifically so that pt gets 2 does of IV dilaudid followed by oral dilaudid in attempt to wean IV dilaudid while Oxycontin builds up  - Lidocaine patches x2  - Continue folic acid 1mg daily  - Utox (5/17): pending collection  - CXR (5/17): negative for acute process  - Bowel regimen for opioid-induced constipation with DocuSenna 2tabs BID and Miralax daily; LBM 5/17     # Hx of nocturnal oxygen dependence   - On  2-3L at night, however patient reports daytime hypoxia as well in the past, currently on 2L 5/17   - CXR (5/17): negative for acute process     # Hx choledocholithiasis 7/2024 -improved    - Worsening hemolysis in setting of active infection could be contributing to increased hyperbilirubinemia    - July 2024 s/p ERCP with biliary sphincterotomy where sludge and stones were removed, achieving complete clearance    - 1/31/25 was planned for cholecystectomy with Dr. Dove but no show on day of surgery and again 2/13 and 2/27    - Tbili stable     # Hx Priapism   - No active issues on admit    - Hx previously drained by urology    - Continue home Sudafed 30mg daily PRN priapism     # Nodular lymphocyte predominant Hodgkins lymphoma (NLPHL)     - Follows with Dr. Stoll   - s/p Rituxan and prednisone q3 weeks (C1 1/18/24, C2 2/8/24, Missed C3 d/t sickle cell pain crisis, C4 4/4/24, C5 5/16/24, C6 6/7/24)    - 3/13 No show to onc appt    - 4/9 PET showed interval development of new FDG focus in mid abdomen   - Outpatient appointment scheduled 5/27       # Prophy   - ASA 81mg BID x6 weeks post-op (stop 6/21)  - SCDs, encouraged ambulation       DISPO:  - Code Status: Full Code (confirmed on admission)    - Access: LUE PICC  - DC home with resumed O2 and home care with IV antibiotics pending improvement in pain    - NOK: Melissa, mother, [phone]    - Pulm FUV 5/21, Onc FUV 5/27, Ortho FUV 6/9, ID FUV 6/17, Sickle Cell FUV 7/9    I spent >60 minutes in the professional and overall care of this patient    Assessment and plan as above discussed with attending physician Dr. Deanna Jenkins, APRN-CNP         [1] aspirin, 81 mg, oral, BID  calcium carbonate-vitamin D3, 1 tablet, oral, BID  DAPTOmycin (Cubicin) 435 mg in sodium chloride 0.9% 50 mL IV, 6 mg/kg, intravenous, q24h  ertapenem, 1 g, intravenous, q24h  folic acid, 1 mg, oral, Daily  lidocaine, 2 patch, transdermal, Daily  oxyCODONE ER, 20 mg,  oral, q12h REYNALDO  polyethylene glycol, 17 g, oral, Daily  sennosides-docusate sodium, 2 tablet, oral, BID     [2]    [3] PRN medications: diphenhydrAMINE, HYDROmorphone, HYDROmorphone, ondansetron ODT **OR** [DISCONTINUED] ondansetron, oxygen, pseudoephedrine

## 2025-05-19 NOTE — CARE PLAN
The patient's goals for the shift include    Problem: Pain - Adult  Goal: Verbalizes/displays adequate comfort level or baseline comfort level  Outcome: Progressing     Problem: Safety - Adult  Goal: Free from fall injury  Outcome: Progressing     Problem: Discharge Planning  Goal: Discharge to home or other facility with appropriate resources  Outcome: Progressing     Problem: Chronic Conditions and Co-morbidities  Goal: Patient's chronic conditions and co-morbidity symptoms are monitored and maintained or improved  Outcome: Progressing     Problem: Nutrition  Goal: Nutrient intake appropriate for maintaining nutritional needs  Outcome: Progressing     Problem: Pain  Goal: Takes deep breaths with improved pain control throughout the shift  Outcome: Progressing  Goal: Turns in bed with improved pain control throughout the shift  Outcome: Progressing  Goal: Walks with improved pain control throughout the shift  Outcome: Progressing  Goal: Performs ADL's with improved pain control throughout shift  Outcome: Progressing  Goal: Participates in PT with improved pain control throughout the shift  Outcome: Progressing  Goal: Free from opioid side effects throughout the shift  Outcome: Progressing  Goal: Free from acute confusion related to pain meds throughout the shift  Outcome: Progressing       The clinical goals for the shift include patent will remain safe and free of falls

## 2025-05-19 NOTE — PROGRESS NOTES
05/19/25 1345   Discharge Planning   Living Arrangements Parent   Support Systems Parent   Assistance Needed IV antibiotics   Type of Residence Private residence   Number of Stairs to Enter Residence 5   Number of Stairs Within Residence 0   Home or Post Acute Services In home services   Type of Home Care Services Home nursing visits   Expected Discharge Disposition Home Heal   Financial Resource Strain   How hard is it for you to pay for the very basics like food, housing, medical care, and heating? Not very   Housing Stability   In the last 12 months, was there a time when you were not able to pay the mortgage or rent on time? N   In the past 12 months, how many times have you moved where you were living? 0   At any time in the past 12 months, were you homeless or living in a shelter (including now)? N   Transportation Needs   In the past 12 months, has lack of transportation kept you from medical appointments or from getting medications? no   In the past 12 months, has lack of transportation kept you from meetings, work, or from getting things needed for daily living? No   Patient Choice   Patient / Family choosing to utilize agency / facility established prior to hospitalization Yes     Care Transitions Note    Plan per Medical/Surgical Team: Pt with Sickle Cell disease was admitted due to a pain crisis.  He is also being treated for septic arthritis.  Status: IP  Payor Source: [employer]  Discharge disposition: Home with [facility] for IV atbx  Expected date of discharge: 5/21/25  Barriers: none  PCP / Primary Oncologist: Dr. Cruz  Preferred Pharmacy: [facility]  home infusion pharmacy  Preferred home care agency: [facility]    This TCC and SW met with the pt to discuss his discharge plan. He will be returning to home, where he lives with his mother.  The pt states he was able to have [facility] come out to do the initial teaching for the IV antibiotics and then he was readmitted due to pain.  He feels he and his mom will be able  to administer the IV Dapto and IV Ertapenem independently at home after this hospital stay.  Resume [facility] orders have been placed.  He has a Power PICC in place.  He is active with [facility] for nocturnal 02.  The pt states he is independent- no other DME needed at this time.  Will continue to follow to assist with the discharge plan.  Jadyn Lacey RN, TCC

## 2025-05-19 NOTE — CARE PLAN
The patient's goals for the shift include      Problem: Pain - Adult  Goal: Verbalizes/displays adequate comfort level or baseline comfort level  Outcome: Progressing     Problem: Safety - Adult  Goal: Free from fall injury  Outcome: Progressing     Problem: Discharge Planning  Goal: Discharge to home or other facility with appropriate resources  Outcome: Progressing     Problem: Chronic Conditions and Co-morbidities  Goal: Patient's chronic conditions and co-morbidity symptoms are monitored and maintained or improved  Outcome: Progressing     Problem: Nutrition  Goal: Nutrient intake appropriate for maintaining nutritional needs  Outcome: Progressing     Problem: Pain  Goal: Takes deep breaths with improved pain control throughout the shift  Outcome: Progressing  Goal: Turns in bed with improved pain control throughout the shift  Outcome: Progressing  Goal: Walks with improved pain control throughout the shift  Outcome: Progressing  Goal: Performs ADL's with improved pain control throughout shift  Outcome: Progressing  Goal: Participates in PT with improved pain control throughout the shift  Outcome: Progressing  Goal: Free from opioid side effects throughout the shift  Outcome: Progressing  Goal: Free from acute confusion related to pain meds throughout the shift  Outcome: Progressing     The clinical goals for the shift include Patient will remain safe and maintain well-controlled pain throughout the shift

## 2025-05-19 NOTE — CARE PLAN
Brief ID note    Patient with much reduced CRP at 2.96. Afebrile during this Admission. White count improving with sickle cell pain management. Xray with joint effusion but no plans for tap with orthopedics. Pathology remains pending on previous synovial biopsy.    -Continue IV daptomycin 6mg/kg q24h and ertapenem 1g q24h (allergic to CTX) through 6/20/25   -Continue to check weekly CK while in the hospital  -Please fax weekly CBC with diff, BMP, LFTs, CK to [facility] at [phone] ATTN Jb Altman   -ID follow up in the system with Dr. Altman    Infectious disease will sign off at this time. Please feel free to reach out with any questions or concerns. If any questions about this patient please haiku or call id pager [phone]    Ishaan Barros  Infectious Diseases  Team A

## 2025-05-19 NOTE — ASSESSMENT & PLAN NOTE
Joellen Narayan is a 24 y.o. 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 Hgb and lysis labs stable at baseline. Pt admitted for further management of pain. Started on IV dilaudid per care plan. ID consulted for atb ordering, rec Xray R elbow and ortho consult. Xray R elbow (5/18) showing postsurgical changes of the elbow with large joint effusion and soft tissue edema. Ortho consulted 5/18, rec no acute ortho intervention. Added oral dilaudid into pain regimen 5/19 in attempt to wean IV dilaudid. DC home with resumed O2 and home care with IV antibiotics pending improvement in pain.    Updates 5/19:  - Started Oxycontin 20mg BID (PA approved)  - Added oral dilaudid into pain regimen 5/19 in attempt to wean IV dilaudid     # Septic Arthritis    - Recently admitted and discharged from hospital on 5/16 and treated for SA  - Ortho consulted 5/8, s/p aspirate (>44k cells)   - MRI w/anesthesia (5/9) R radius/humerus showing concern for septic arthritis, osteomyelitis, myositis, severe muscle and subcutaneous soft tissue edema, and possible cellulitis   - s/p R elbow I&D 5/10 with ortho    - Tissue/ wound cultures NGTD, Fungal Cultures NGTD 5/12  - Final Ortho recs from 5/12; WBAT RUE, Mepilex remove POD7 (5/17/25), drain removed 5/12, require 6 weeks total of DVT prophylaxis from an orthopedic standpoint, Calcium/Vitamin D 500mg-400IU BID for 6 weeks (continued on admit),  follow up w/ Dr. Tello 2 weeks after surgery for post-operative appointment (scheduled 6/9)   - Final ID recs for long term antibiotics on 5/13; IV daptomycin 6mg/kg q24h and ertapenem 1g q24h until 6/20/25--> continued on admission  - ID consulted 5/18 for atb ordering, rec Xray R elbow and ortho consult  - Xray R elbow (5/18) showing postsurgical changes of the elbow with large joint effusion and soft tissue edema  - Ortho consulted 5/18, rec no acute ortho intervention  - CRP 2.96, ESR 8, CK 49 (5/18)     # Hgb SS disease    # Post-surgical pain  - OARRS reviewed on admission, no aberrancies noted    - Hgb BL ~8; Hgb 7.2 (5/18)--> 7.6 (5/19)  - LDH BL ~500;  (5/18)--> 451 (5/19)  - Tbili BL ~8; Tbli 8.3 (5/18)--> 7.9 (5/19)  - Pain is likely post-surgical + sickle cell pain; when pt was dc on 5/16 his MS contin wasn't approved with PA and he didn't want to remain admitted (also was discharged right off IV dilaudid)--> so current pain is likely withdrawal-related  - s/p IV dilaudid 4mg q2hrs PRN severe pain (5/17)  - Increased IV dilaudid to 6mg q2hrs PRN severe pain (5/18-)  - Started Oxycontin 20mg BID (5/19); PA approved  - Added PO dilaudid 8mg q4hrs PRN breakthrough pain- ordered specifically so that pt gets 2 does of IV dilaudid followed by oral dilaudid in attempt to wean IV dilaudid while Oxycontin builds up  - Lidocaine patches x2  - Continue folic acid 1mg daily  - Utox (5/17): pending collection  - CXR (5/17): negative for acute process  - Bowel regimen for opioid-induced constipation with DocuSenna 2tabs BID and Miralax daily; LBM 5/17     # Hx of nocturnal oxygen dependence   - On 2-3L at night, however patient reports daytime hypoxia as well in the past, currently on 2L 5/17   - CXR (5/17): negative for acute process     # Hx choledocholithiasis 7/2024 -improved    - Worsening hemolysis in setting of active infection could be contributing to increased hyperbilirubinemia    - July 2024 s/p  ERCP with biliary sphincterotomy where sludge and stones were removed, achieving complete clearance    - 1/31/25 was planned for cholecystectomy with Dr. Dove but no show on day of surgery and again 2/13 and 2/27    - Tbili stable     # Hx Priapism   - No active issues on admit    - Hx previously drained by urology    - Continue home Sudafed 30mg daily PRN priapism     # Nodular lymphocyte predominant Hodgkins lymphoma (NLPHL)     - Follows with Dr. Stoll   - s/p Rituxan and prednisone q3 weeks (C1 1/18/24, C2 2/8/24, Missed C3 d/t sickle cell pain crisis, C4 4/4/24, C5 5/16/24, C6 6/7/24)    - 3/13 No show to onc appt    - 4/9 PET showed interval development of new FDG focus in mid abdomen   - Outpatient appointment scheduled 5/27       # Prophy   - ASA 81mg BID x6 weeks post-op (stop 6/21)  - SCDs, encouraged ambulation       DISPO:  - Code Status: Full Code (confirmed on admission)    - Access: LUE PICC  - DC home with resumed O2 and home care with IV antibiotics pending improvement in pain    - NOK: Melissa, mother, [phone]    - Pulm FUV 5/21, Onc FUV 5/27, Ortho FUV 6/9, ID FUV 6/17, Sickle Cell FUV 7/9

## 2025-05-20 LAB
ALBUMIN SERPL BCP-MCNC: 4 G/DL (ref 3.4–5)
ALP SERPL-CCNC: 227 U/L (ref 33–120)
ALT SERPL W P-5'-P-CCNC: 80 U/L (ref 10–52)
ANION GAP SERPL CALC-SCNC: 13 MMOL/L (ref 10–20)
AST SERPL W P-5'-P-CCNC: 104 U/L (ref 9–39)
BASOPHILS # BLD AUTO: 0.06 X10*3/UL (ref 0–0.1)
BASOPHILS NFR BLD AUTO: 0.6 %
BILIRUB SERPL-MCNC: 7.4 MG/DL (ref 0–1.2)
BUN SERPL-MCNC: 10 MG/DL (ref 6–23)
CALCIUM SERPL-MCNC: 10.1 MG/DL (ref 8.6–10.6)
CHLORIDE SERPL-SCNC: 100 MMOL/L (ref 98–107)
CO2 SERPL-SCNC: 28 MMOL/L (ref 21–32)
CREAT SERPL-MCNC: 0.58 MG/DL (ref 0.5–1.3)
CRP SERPL-MCNC: 1.6 MG/DL
EGFRCR SERPLBLD CKD-EPI 2021: >90 ML/MIN/1.73M*2
EOSINOPHIL # BLD AUTO: 0.98 X10*3/UL (ref 0–0.7)
EOSINOPHIL NFR BLD AUTO: 10 %
ERYTHROCYTE [DISTWIDTH] IN BLOOD BY AUTOMATED COUNT: 19.9 % (ref 11.5–14.5)
GLUCOSE SERPL-MCNC: 89 MG/DL (ref 74–99)
HCT VFR BLD AUTO: 22.2 % (ref 41–52)
HGB BLD-MCNC: 7.6 G/DL (ref 13.5–17.5)
HGB RETIC QN: 32 PG (ref 28–38)
IMM GRANULOCYTES # BLD AUTO: 0.06 X10*3/UL (ref 0–0.7)
IMM GRANULOCYTES NFR BLD AUTO: 0.6 % (ref 0–0.9)
IMMATURE RETIC FRACTION: 13 %
LDH SERPL L TO P-CCNC: 444 U/L (ref 84–246)
LYMPHOCYTES # BLD AUTO: 2.64 X10*3/UL (ref 1.2–4.8)
LYMPHOCYTES NFR BLD AUTO: 27 %
MCH RBC QN AUTO: 29.3 PG (ref 26–34)
MCHC RBC AUTO-ENTMCNC: 34.2 G/DL (ref 32–36)
MCV RBC AUTO: 86 FL (ref 80–100)
MONOCYTES # BLD AUTO: 1.17 X10*3/UL (ref 0.1–1)
MONOCYTES NFR BLD AUTO: 12 %
NEUTROPHILS # BLD AUTO: 4.88 X10*3/UL (ref 1.2–7.7)
NEUTROPHILS NFR BLD AUTO: 49.8 %
NRBC BLD-RTO: 1.2 /100 WBCS (ref 0–0)
PLATELET # BLD AUTO: 810 X10*3/UL (ref 150–450)
POTASSIUM SERPL-SCNC: 4.7 MMOL/L (ref 3.5–5.3)
PROT SERPL-MCNC: 6.8 G/DL (ref 6.4–8.2)
RBC # BLD AUTO: 2.59 X10*6/UL (ref 4.5–5.9)
RETICS #: 0.42 X10*6/UL (ref 0.02–0.12)
RETICS/RBC NFR AUTO: 16.2 % (ref 0.5–2)
SODIUM SERPL-SCNC: 136 MMOL/L (ref 136–145)
WBC # BLD AUTO: 9.8 X10*3/UL (ref 4.4–11.3)

## 2025-05-20 PROCEDURE — 2500000001 HC RX 250 WO HCPCS SELF ADMINISTERED DRUGS (ALT 637 FOR MEDICARE OP)

## 2025-05-20 PROCEDURE — 85045 AUTOMATED RETICULOCYTE COUNT: CPT

## 2025-05-20 PROCEDURE — 80053 COMPREHEN METABOLIC PANEL: CPT

## 2025-05-20 PROCEDURE — 99233 SBSQ HOSP IP/OBS HIGH 50: CPT

## 2025-05-20 PROCEDURE — 2500000004 HC RX 250 GENERAL PHARMACY W/ HCPCS (ALT 636 FOR OP/ED): Mod: JZ,TB

## 2025-05-20 PROCEDURE — 2500000004 HC RX 250 GENERAL PHARMACY W/ HCPCS (ALT 636 FOR OP/ED): Mod: JZ,TB | Performed by: NURSE PRACTITIONER

## 2025-05-20 PROCEDURE — 85025 COMPLETE CBC W/AUTO DIFF WBC: CPT

## 2025-05-20 PROCEDURE — 2500000004 HC RX 250 GENERAL PHARMACY W/ HCPCS (ALT 636 FOR OP/ED): Mod: JZ

## 2025-05-20 PROCEDURE — 99221 1ST HOSP IP/OBS SF/LOW 40: CPT

## 2025-05-20 PROCEDURE — 2500000001 HC RX 250 WO HCPCS SELF ADMINISTERED DRUGS (ALT 637 FOR MEDICARE OP): Performed by: NURSE PRACTITIONER

## 2025-05-20 PROCEDURE — 2500000005 HC RX 250 GENERAL PHARMACY W/O HCPCS

## 2025-05-20 PROCEDURE — 86140 C-REACTIVE PROTEIN: CPT

## 2025-05-20 PROCEDURE — 1170000001 HC PRIVATE ONCOLOGY ROOM DAILY

## 2025-05-20 PROCEDURE — 83615 LACTATE (LD) (LDH) ENZYME: CPT

## 2025-05-20 RX ADMIN — ERTAPENEM SODIUM 1 G: 1 INJECTION INTRAMUSCULAR; INTRAVENOUS at 14:05

## 2025-05-20 RX ADMIN — FOLIC ACID 1 MG: 1 TABLET ORAL at 08:13

## 2025-05-20 RX ADMIN — DAPTOMYCIN 435 MG: 500 INJECTION, POWDER, LYOPHILIZED, FOR SOLUTION INTRAVENOUS at 13:08

## 2025-05-20 RX ADMIN — LIDOCAINE 4% 2 PATCH: 40 PATCH TOPICAL at 19:47

## 2025-05-20 RX ADMIN — HYDROMORPHONE HYDROCHLORIDE 4 MG: 2 INJECTION, SOLUTION INTRAMUSCULAR; INTRAVENOUS; SUBCUTANEOUS at 23:46

## 2025-05-20 RX ADMIN — ASPIRIN 81 MG CHEWABLE TABLET 81 MG: 81 TABLET CHEWABLE at 20:41

## 2025-05-20 RX ADMIN — Medication 1 TABLET: at 08:13

## 2025-05-20 RX ADMIN — HYDROMORPHONE HYDROCHLORIDE 6 MG: 2 INJECTION, SOLUTION INTRAMUSCULAR; INTRAVENOUS; SUBCUTANEOUS at 08:38

## 2025-05-20 RX ADMIN — HYDROMORPHONE HYDROCHLORIDE 6 MG: 2 INJECTION, SOLUTION INTRAMUSCULAR; INTRAVENOUS; SUBCUTANEOUS at 15:45

## 2025-05-20 RX ADMIN — Medication 1 TABLET: at 20:41

## 2025-05-20 RX ADMIN — HYDROMORPHONE HYDROCHLORIDE 4 MG: 2 INJECTION, SOLUTION INTRAMUSCULAR; INTRAVENOUS; SUBCUTANEOUS at 21:53

## 2025-05-20 RX ADMIN — HYDROMORPHONE HYDROCHLORIDE 6 MG: 2 INJECTION, SOLUTION INTRAMUSCULAR; INTRAVENOUS; SUBCUTANEOUS at 13:08

## 2025-05-20 RX ADMIN — HYDROMORPHONE HYDROCHLORIDE 8 MG: 4 TABLET ORAL at 03:39

## 2025-05-20 RX ADMIN — ASPIRIN 81 MG CHEWABLE TABLET 81 MG: 81 TABLET CHEWABLE at 08:13

## 2025-05-20 RX ADMIN — OXYCODONE HYDROCHLORIDE 20 MG: 20 TABLET, FILM COATED, EXTENDED RELEASE ORAL at 08:13

## 2025-05-20 RX ADMIN — HYDROMORPHONE HYDROCHLORIDE 6 MG: 2 INJECTION, SOLUTION INTRAMUSCULAR; INTRAVENOUS; SUBCUTANEOUS at 01:05

## 2025-05-20 RX ADMIN — HYDROMORPHONE HYDROCHLORIDE 8 MG: 4 TABLET ORAL at 11:09

## 2025-05-20 RX ADMIN — HYDROMORPHONE HYDROCHLORIDE 8 MG: 4 TABLET ORAL at 17:43

## 2025-05-20 RX ADMIN — HYDROMORPHONE HYDROCHLORIDE 4 MG: 2 INJECTION, SOLUTION INTRAMUSCULAR; INTRAVENOUS; SUBCUTANEOUS at 19:47

## 2025-05-20 RX ADMIN — HYDROMORPHONE HYDROCHLORIDE 6 MG: 2 INJECTION, SOLUTION INTRAMUSCULAR; INTRAVENOUS; SUBCUTANEOUS at 05:22

## 2025-05-20 RX ADMIN — OXYCODONE HYDROCHLORIDE 30 MG: 20 TABLET, FILM COATED, EXTENDED RELEASE ORAL at 20:41

## 2025-05-20 ASSESSMENT — COGNITIVE AND FUNCTIONAL STATUS - GENERAL
DAILY ACTIVITIY SCORE: 24
MOBILITY SCORE: 24
MOBILITY SCORE: 24
DAILY ACTIVITIY SCORE: 24

## 2025-05-20 ASSESSMENT — PAIN DESCRIPTION - LOCATION
LOCATION: GENERALIZED
LOCATION: ARM
LOCATION: GENERALIZED
LOCATION: ARM
LOCATION: GENERALIZED
LOCATION: ARM
LOCATION: GENERALIZED

## 2025-05-20 ASSESSMENT — PAIN - FUNCTIONAL ASSESSMENT

## 2025-05-20 ASSESSMENT — PAIN SCALES - GENERAL
PAINLEVEL_OUTOF10: 7
PAINLEVEL_OUTOF10: 5 - MODERATE PAIN
PAINLEVEL_OUTOF10: 10 - WORST POSSIBLE PAIN
PAINLEVEL_OUTOF10: 9
PAINLEVEL_OUTOF10: 10 - WORST POSSIBLE PAIN
PAINLEVEL_OUTOF10: 9
PAINLEVEL_OUTOF10: 10 - WORST POSSIBLE PAIN
PAINLEVEL_OUTOF10: 9
PAINLEVEL_OUTOF10: 10 - WORST POSSIBLE PAIN
PAINLEVEL_OUTOF10: 8
PAINLEVEL_OUTOF10: 10 - WORST POSSIBLE PAIN
PAINLEVEL_OUTOF10: 7
PAINLEVEL_OUTOF10: 10 - WORST POSSIBLE PAIN
PAINLEVEL_OUTOF10: 9

## 2025-05-20 ASSESSMENT — ENCOUNTER SYMPTOMS
BACK PAIN: 1
FATIGUE: 1

## 2025-05-20 ASSESSMENT — PAIN DESCRIPTION - ORIENTATION
ORIENTATION: RIGHT;MID;UPPER

## 2025-05-20 NOTE — CONSULTS
Inpatient consult to Integrative Hem/Onc  Consult performed by: RAAD Cronin-CNP  Consult ordered by: Nikki Villar PA-C          Reason For Consult  Symptom management     History Of Present Illness  Joellen Narayan is a 24 y.o. 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 Integrative hematology/oncology consulted by [facility] team for non-pharmacological symptom management of sickle cell pain crisis.    Integrative hematology/oncology consult team introduced to pt. Non-pharmacological symptom management interventions reviewed, including: Reiki, meditation, mindfulness practices, guided imagery, as well as acupuncture, acupressure, and gentle bodywork. Pt declined integrative heme/onc services. Music therapy, art therapy, chaplaincy and pet therapy offered to pt, pt declined.        Information obtained from chart review, discussion with patient/family, and discussion with primary team.       Past Medical History  He has a past medical history of Acute chest syndrome (Multi), Corrosion of unspecified body region, unspecified degree (12/31/2014), Impetigo (01/04/2024), Nicotine use disorder, Nodular lymphocyte predominant Hodgkin lymphoma, Non Hodgkin's lymphoma, Personal history of diseases of the blood and blood-forming organs and certain disorders involving the immune mechanism, Personal history of other (healed) physical injury and trauma  (01/03/2015), Personal history of other diseases of the circulatory system, Personal history of other diseases of the circulatory system, Priapism, Rash and other nonspecific skin eruption (09/09/2014), and Sickle cell disease (Multi).    Surgical History  He has a past surgical history that includes IR CVC tunneled (6/21/2022); CT guided percutaneous biopsy LYMPH node superficial (11/18/2022); and CT guided percutaneous abdominal retroperitoneum biopsy (11/30/2023).     Social History  He reports that he has been smoking cigars. He has been exposed to tobacco smoke. He has never used smokeless tobacco. He reports that he does not currently use alcohol. He reports current drug use. Drug: Marijuana.    Family History  Family History[1]     Allergies  Cefepime, Amoxicillin, and Ceftriaxone    Review of Systems   Constitutional:  Positive for fatigue.   Musculoskeletal:  Positive for back pain.        Physical Exam  Vitals and nursing note reviewed.   Constitutional:       General: He is not in acute distress.     Appearance: Normal appearance. He is normal weight. He is ill-appearing.   HENT:      Head: Normocephalic and atraumatic.      Nose: Nose normal.      Mouth/Throat:      Mouth: Mucous membranes are moist.   Eyes:      Extraocular Movements: Extraocular movements intact.      Pupils: Pupils are equal, round, and reactive to light.   Cardiovascular:      Rate and Rhythm: Normal rate.   Pulmonary:      Effort: Pulmonary effort is normal.   Abdominal:      General: Abdomen is flat.      Palpations: Abdomen is soft.   Skin:     General: Skin is warm and dry.   Neurological:      General: No focal deficit present.      Mental Status: He is alert and oriented to person, place, and time. Mental status is at baseline.   Psychiatric:         Mood and Affect: Mood normal.         Behavior: Behavior normal.         Thought Content: Thought content normal.         Judgment: Judgment normal.          Last Recorded  "Vitals  Blood pressure 113/65, pulse 74, temperature 36.9 °C (98.4 °F), resp. rate 18, height 1.88 m (6' 2\"), weight 68.3 kg (150 lb 9.2 oz), SpO2 94%.    Relevant Results  Scheduled medications  Scheduled Medications[2]  Continuous medications  Continuous Medications[3]  PRN medications  PRN Medications[4]    Results for orders placed or performed during the hospital encounter of 05/17/25 (from the past 24 hours)   CBC and Auto Differential   Result Value Ref Range    WBC 9.8 4.4 - 11.3 x10*3/uL    nRBC 1.2 (H) 0.0 - 0.0 /100 WBCs    RBC 2.59 (L) 4.50 - 5.90 x10*6/uL    Hemoglobin 7.6 (L) 13.5 - 17.5 g/dL    Hematocrit 22.2 (L) 41.0 - 52.0 %    MCV 86 80 - 100 fL    MCH 29.3 26.0 - 34.0 pg    MCHC 34.2 32.0 - 36.0 g/dL    RDW 19.9 (H) 11.5 - 14.5 %    Platelets 810 (H) 150 - 450 x10*3/uL    Neutrophils % 49.8 40.0 - 80.0 %    Immature Granulocytes %, Automated 0.6 0.0 - 0.9 %    Lymphocytes % 27.0 13.0 - 44.0 %    Monocytes % 12.0 2.0 - 10.0 %    Eosinophils % 10.0 0.0 - 6.0 %    Basophils % 0.6 0.0 - 2.0 %    Neutrophils Absolute 4.88 1.20 - 7.70 x10*3/uL    Immature Granulocytes Absolute, Automated 0.06 0.00 - 0.70 x10*3/uL    Lymphocytes Absolute 2.64 1.20 - 4.80 x10*3/uL    Monocytes Absolute 1.17 (H) 0.10 - 1.00 x10*3/uL    Eosinophils Absolute 0.98 (H) 0.00 - 0.70 x10*3/uL    Basophils Absolute 0.06 0.00 - 0.10 x10*3/uL   Comprehensive Metabolic Panel   Result Value Ref Range    Glucose 89 74 - 99 mg/dL    Sodium 136 136 - 145 mmol/L    Potassium 4.7 3.5 - 5.3 mmol/L    Chloride 100 98 - 107 mmol/L    Bicarbonate 28 21 - 32 mmol/L    Anion Gap 13 10 - 20 mmol/L    Urea Nitrogen 10 6 - 23 mg/dL    Creatinine 0.58 0.50 - 1.30 mg/dL    eGFR >90 >60 mL/min/1.73m*2    Calcium 10.1 8.6 - 10.6 mg/dL    Albumin 4.0 3.4 - 5.0 g/dL    Alkaline Phosphatase 227 (H) 33 - 120 U/L    Total Protein 6.8 6.4 - 8.2 g/dL     (H) 9 - 39 U/L    Bilirubin, Total 7.4 (H) 0.0 - 1.2 mg/dL    ALT 80 (H) 10 - 52 U/L   Lactate " Dehydrogenase   Result Value Ref Range     (H) 84 - 246 U/L   Reticulocytes   Result Value Ref Range    Retic % 16.2 (H) 0.5 - 2.0 %    Retic Absolute 0.420 (H) 0.022 - 0.118 x10*6/uL    Reticulocyte Hemoglobin 32 28 - 38 pg    Immature Retic fraction 13.0 <=16.0 %   C-reactive protein   Result Value Ref Range    C-Reactive Protein 1.60 (H) <1.00 mg/dL     *Note: Due to a large number of results and/or encounters for the requested time period, some results have not been displayed. A complete set of results can be found in Results Review.     XR elbow right 3+ views  Result Date: 5/18/2025  Interpreted By:  Tung Polanco, STUDY: XR ELBOW RIGHT 3+ VIEWS; ;  5/18/2025 1:37 pm   INDICATION: Signs/Symptoms:recent washout for septic arthritis, imagine for severe pain.     COMPARISON: MRI 05/09/2025.   ACCESSION NUMBER(S): [id number]   ORDERING CLINICIAN: ROSEANNA SOSA   FINDINGS: Four views of the right elbow   No acute fracture. Joint alignment is maintained. Diffuse soft tissue edema. Large joint effusion.       Postsurgical changes of the elbow with large joint effusion and diffuse soft tissue edema. No acute fracture.     MACRO: None   Signed by: Tung Polanco 5/18/2025 1:52 PM Dictation workstation:   [id number]    ECG 12 lead  Result Date: 5/17/2025  Normal sinus rhythm Minimal voltage criteria for LVH, may be normal variant Borderline ECG When compared with ECG of 16-MAY-2025 11:04, Premature ventricular complexes are no longer Present Katharina-Parkinson-White is no longer Present See ED provider note for full interpretation and clinical correlation Confirmed by Alix Duran ([id number]) on 5/17/2025 8:16:05 PM    XR chest 2 views  Result Date: 5/17/2025  Interpreted By:  Tung Polanco,  and Chad Cordero STUDY: XR CHEST 2 VIEWS;  5/17/2025 3:09 pm   INDICATION: Signs/Symptoms:r/o pneumothorax, pneumonia, acute chest syndrome.   COMPARISON: XR CHEST 1 VIEW 5/15/2025, CT ANGIO CHEST FOR PULMONARY EMBOLISM  5/3/2025   ACCESSION NUMBER(S): [id number]   ORDERING CLINICIAN: RUPINDER WORKMAN   FINDINGS: PA and lateral radiographs of the chest were provided. Left upper extremity PICC with terminus at the SVC.   CARDIOMEDIASTINAL SILHOUETTE: Cardiomediastinal silhouette is normal in size and configuration.   LUNGS: No pneumothorax, pleural effusion, or focal consolidation..   ABDOMEN: No remarkable upper abdominal findings.   BONES: No acute osseous changes.       1. No evidence of acute cardiopulmonary process.   I personally reviewed the images/study and I agree with the findings as stated by Gil Bonilla MD. This study was interpreted at [place], [place].   MACRO: None   Signed by: Tung Polanco 5/17/2025 3:23 PM Dictation workstation:   [id number]    Bedside PICC Imaging  Result Date: 5/16/2025  These images are not reportable by radiology and will not be interpreted by  Radiologists.    XR chest 1 view  Result Date: 5/15/2025  Interpreted By:  Allyn Woods and Liu Scott STUDY: XR CHEST 1 VIEW;  5/15/2025 9:24 am   INDICATION: Signs/Symptoms:sickle cell new oxygen requirements.     COMPARISON: Chest x-ray 05/08/2025   ACCESSION NUMBER(S): [id number]   ORDERING CLINICIAN: DONNA MORGAN   FINDINGS: AP radiograph of the chest was provided.       CARDIOMEDIASTINAL SILHOUETTE: Cardiomediastinal silhouette is normal in size and configuration.   LUNGS: No focal consolidation, pleural effusion or pneumothorax   ABDOMEN: No remarkable upper abdominal findings.   BONES: No acute osseous changes.       1.  No evidence of acute cardiopulmonary process.   I personally reviewed the images/study and I agree with the findings as stated by resident physician Daniel Gale MD. This study was interpreted at [place], [place].   MACRO: None   Signed by: Allyn Woods 5/15/2025 9:45 AM Dictation workstation:   [id number]    MR humerus  right w and wo IV contrast  Addendum Date: 5/12/2025  Interpreted By:  Tung Polanco, ADDENDUM: The findings discussed should read as follows: Tung Polanco discussed above findings by telephone with the primary team on 5/9/2025 at 3:37 pm. (**-RCF-**)   Signed by: Tung Polanco 5/12/2025 9:04 AM   -------- ORIGINAL REPORT -------- Dictation workstation:   [id number]    Result Date: 5/12/2025  Interpreted By:  Shane Varela and Lawrence Austen STUDY: MRI of the  right  humerus and forearm with and without contrast dated  5/9/2025.   INDICATION:     Osteomyelitis   COMPARISON: Radiographs 05/07/2025.   ACCESSION NUMBER(S): [id number]; [id number]   ORDERING CLINICIAN: LINDY ARCEO   TECHNIQUE: Multiplanar multisequence MRI of the  right humerus and forearm was performed  with and without intravenous gadolinium based contrast. 15 mL of Dotarem were administered intravenously   FINDINGS: OSSEOUS STRUCTURES:   Diffuse T2 hyperintense signal and T1 hypointense signal through the humerus extending from the proximal diaphysis through the distal epiphysis with areas of enhancement in the distal humerus and moderate surrounding periosteal reaction especially distally. T2 hyperintensity, T1 hypointensity and intramedullary enhancement in the mid ulna diaphysis without significant enhancement or periosteal reaction. There is minimal signal change of the ulna at the level of the elbow joint.   MUSCLES, TENDONS, AND LIGAMENTS:   There is severe edema and enhancement throughout the muscles of the upper arm surrounding the distal humerus, predominantly in the triceps musculature and to a lesser extent the biceps muscle. There is some muscle and soft tissue edema extending into the proximal forearm.   JOINTS:   Moderate-sized complex elbow joint effusion with periarticular edema. Associated enhancement in the soft tissues   ASSOCIATED SOFT TISSUES:   Diffuse soft tissue edema and enhancement of the upper  extremity. No discrete soft tissue fluid collection.       1. Elbow joint effusion with synovial enhancement compatible with known septic arthritis. 2. Abnormal marrow signal, enhancement and periosteal reaction in the humerus extending from the distal epiphysis to the proximal diaphysis with the most severe findings of the distal humerus highly concerning for osteomyelitis. 3. There is also abnormal marrow signal in the ulna diaphysis without periosteal reaction, favored to represent osseous infarcts in the setting of sickle cell disease although superimposed infection/osteomyelitis can not be excluded. 4. Diffuse muscle edema and enhancement in the muscles of the upper arm concerning for myositis. Severe subcutaneous soft tissue edema and fluid compatible with cellulitis. No discrete fluid collection to suggest presence of abscess.   MACRO: Tung Polanco discussed above findings by telephone with  LINDY ARCEO on 5/9/2025 at 3:37 pm.  (**-RCF-**)   Signed by: Shane Varela 5/9/2025 3:44 PM Dictation workstation:   [id number]    MR radius ulna right w IV contrast  Addendum Date: 5/12/2025  Interpreted By:  Tung Polanco, ADDENDUM: The findings discussed should read as follows: Tung Polanco discussed above findings by telephone with the primary team on 5/9/2025 at 3:37 pm. (**-RCF-**)   Signed by: Tung Polanco 5/12/2025 9:04 AM   -------- ORIGINAL REPORT -------- Dictation workstation:   [id number]    Result Date: 5/12/2025  Interpreted By:  Shane Varela and Lawrence Austen STUDY: MRI of the  right  humerus and forearm with and without contrast dated  5/9/2025.   INDICATION:     Osteomyelitis   COMPARISON: Radiographs 05/07/2025.   ACCESSION NUMBER(S): [id number]; [id number]   ORDERING CLINICIAN: LINDY ARCEO   TECHNIQUE: Multiplanar multisequence MRI of the  right humerus and forearm was performed  with and without intravenous gadolinium based contrast. 15 mL of Dotarem were  administered intravenously   FINDINGS: OSSEOUS STRUCTURES:   Diffuse T2 hyperintense signal and T1 hypointense signal through the humerus extending from the proximal diaphysis through the distal epiphysis with areas of enhancement in the distal humerus and moderate surrounding periosteal reaction especially distally. T2 hyperintensity, T1 hypointensity and intramedullary enhancement in the mid ulna diaphysis without significant enhancement or periosteal reaction. There is minimal signal change of the ulna at the level of the elbow joint.   MUSCLES, TENDONS, AND LIGAMENTS:   There is severe edema and enhancement throughout the muscles of the upper arm surrounding the distal humerus, predominantly in the triceps musculature and to a lesser extent the biceps muscle. There is some muscle and soft tissue edema extending into the proximal forearm.   JOINTS:   Moderate-sized complex elbow joint effusion with periarticular edema. Associated enhancement in the soft tissues   ASSOCIATED SOFT TISSUES:   Diffuse soft tissue edema and enhancement of the upper extremity. No discrete soft tissue fluid collection.       1. Elbow joint effusion with synovial enhancement compatible with known septic arthritis. 2. Abnormal marrow signal, enhancement and periosteal reaction in the humerus extending from the distal epiphysis to the proximal diaphysis with the most severe findings of the distal humerus highly concerning for osteomyelitis. 3. There is also abnormal marrow signal in the ulna diaphysis without periosteal reaction, favored to represent osseous infarcts in the setting of sickle cell disease although superimposed infection/osteomyelitis can not be excluded. 4. Diffuse muscle edema and enhancement in the muscles of the upper arm concerning for myositis. Severe subcutaneous soft tissue edema and fluid compatible with cellulitis. No discrete fluid collection to suggest presence of abscess.   MACRO: Tung Polanco discussed above  findings by telephone with  LINDY ARCEO on 5/9/2025 at 3:37 pm.  (**-RCF-**)   Signed by: Shane Varela 5/9/2025 3:44 PM Dictation workstation:   [id number]    FL fluoro images no charge  Result Date: 5/10/2025  These images are not reportable by radiology and will not be interpreted by  Radiologists.    Bedside Midline Imaging  Result Date: 5/9/2025  These images are not reportable by radiology and will not be interpreted by  Radiologists.    US right upper quadrant  Result Date: 5/9/2025  Interpreted By:  Rakesh Pablo and Kelly Rory STUDY: US RIGHT UPPER QUADRANT;  5/8/2025 5:11 pm   INDICATION: Signs/Symptoms:sickle clell, increasing bilirubinemia.     COMPARISON: Right upper quadrant ultrasound dated 12/27/2024 and 09/18/2024.   ACCESSION NUMBER(S): [id number]   ORDERING CLINICIAN: LINDY ARCEO   TECHNIQUE: Multiple images of the right upper quadrant were obtained.   FINDINGS: LIVER: The liver measures 19.5 cm, previously 20.5 cm, and is free of any focal lesions. The hepatic parenchyma is diffusely mildly hyperechoic compared to adjacent renal parenchyma.     GALLBLADDER: The gallbladder is nondistended, and demonstrates no wall thickening or surrounding fluid. Similar appearance of layering echogenic material with cholelithiasis within the dependent portion of the gallbladder. The gallbladder wall thickness is 0.2 cm. Sonographic Roman's sign is reported negative.     BILE DUCTS: No evidence of intra or extrahepatic biliary dilatation is identified; the common bile duct measures 0.4 cm.   PANCREAS: The pancreas is poorly visualized due to overlying bowel gas.   RIGHT KIDNEY: The right kidney measures 11.1 cm in length. The renal cortical echogenicity and thickness are within normal limit.  No hydronephrosis or renal calculi are seen.       1. Similar hepatomegaly with hepatic steatosis. 2. Cholelithiasis without sonographic evidence of cholecystitis.     I personally reviewed the  images/study and I agree with the findings as stated by Cyrus Grace MD (Radiology Resident).   MACRO: None   Signed by: Rakesh Pablo 5/9/2025 5:54 AM Dictation workstation:   [id number]    XR chest 1 view  Result Date: 5/8/2025  Interpreted By:  Ed Wren  and Baljinder Sanchez STUDY: XR CHEST 1 VIEW;  5/8/2025 10:43 am   INDICATION: Signs/Symptoms:sickle cell, febrile.     COMPARISON: Chest x-ray 05/05/2025   ACCESSION NUMBER(S): [id number]   ORDERING CLINICIAN: DAE LOPEZ   FINDINGS: AP radiograph of the chest was provided.       CARDIOMEDIASTINAL SILHOUETTE: Cardiomediastinal silhouette is stable in size and configuration.   LUNGS: No focal consolidation, pleural effusion or pneumothorax.   ABDOMEN: No remarkable upper abdominal findings.   BONES: No acute osseous changes.       1.  No evidence of acute cardiopulmonary process.   I personally reviewed the images/study and I agree with the findings as stated by resident physician Daniel Gale MD. This study was interpreted at [facility], [place], [place].   MACRO: None   Signed by: Ed Wren 5/8/2025 12:40 PM Dictation workstation:   [id number]    Electrocardiogram, 12-lead PRN ACS symptoms  Result Date: 5/8/2025  Unusual P axis, possible ectopic atrial rhythm Minimal voltage criteria for LVH, may be normal variant Nonspecific ST and T wave abnormality Abnormal ECG When compared with ECG of 03-MAY-2025 06:41, ST no longer depressed in Inferior leads T wave inversion less evident in Inferior leads Confirmed by Tavares Christianson ([id number]) on 5/8/2025 8:40:30 AM    XR forearm right 2 views  Result Date: 5/8/2025  Interpreted By:  Shane Varela and Booth Cameron STUDY: XR SHOULDER RIGHT 2+ VIEWS; XR ELBOW RIGHT 1-2 VIEWS; XR HUMERUS RIGHT; XR FOREARM RIGHT 2 VIEWS; ;  5/7/2025 4:59 pm; 5/7/2025 5:07 pm; 5/7/2025 5:09 pm   INDICATION: Signs/Symptoms:r/o septic joint/osteomyelitis. History of both Hodgkin lymphoma and sickle cell disease  complicated by recurrent pain crises     COMPARISON: RT PD SHOULDER, CMPLT, MIN 2 VIEWS 10/13/2012   ACCESSION NUMBER(S): [id number]; [id number]; [id number]; [id number]   ORDERING CLINICIAN: ELLIOT CARDENAS   FINDINGS: There is no acute fracture. The acromioclavicular joint and glenohumeral joint are intact. The subacromial space is maintained.   There is a large elbow joint effusion. No discrete underlying fracture or erosions. No malalignment. No significant soft tissue swelling.       1. Large elbow joint effusion without evidence of fracture, malalignment or erosions. If there is clinical concern MRI can be performed for further evaluation of this finding   I personally reviewed the image(s)/study and interpretation by Dave Tate MD (resident).   MACRO: None.   Signed by: Shane Varela 5/8/2025 7:42 AM Dictation workstation:   [id number]    XR humerus right  Result Date: 5/8/2025  Interpreted By:  Shane Varela,  Gabriele Acosta STUDY: XR SHOULDER RIGHT 2+ VIEWS; XR ELBOW RIGHT 1-2 VIEWS; XR HUMERUS RIGHT; XR FOREARM RIGHT 2 VIEWS; ;  5/7/2025 4:59 pm; 5/7/2025 5:07 pm; 5/7/2025 5:09 pm   INDICATION: Signs/Symptoms:r/o septic joint/osteomyelitis. History of both Hodgkin lymphoma and sickle cell disease complicated by recurrent pain crises     COMPARISON: RT PD SHOULDER, CMPLT, MIN 2 VIEWS 10/13/2012   ACCESSION NUMBER(S): [id number]; [id number]; [id number]; [id number]   ORDERING CLINICIAN: ELLIOT CARDENAS   FINDINGS: There is no acute fracture. The acromioclavicular joint and glenohumeral joint are intact. The subacromial space is maintained.   There is a large elbow joint effusion. No discrete underlying fracture or erosions. No malalignment. No significant soft tissue swelling.       1. Large elbow joint effusion without evidence of fracture, malalignment or erosions. If there is clinical concern MRI can be performed for further evaluation of this finding   I personally reviewed  the image(s)/study and interpretation by Dave Tate MD (resident).   MACRO: None.   Signed by: Shane Varela 5/8/2025 7:42 AM Dictation workstation:   [id number]    XR shoulder right 2+ views  Result Date: 5/8/2025  Interpreted By:  Shane Varela and Booth Cameron STUDY: XR SHOULDER RIGHT 2+ VIEWS; XR ELBOW RIGHT 1-2 VIEWS; XR HUMERUS RIGHT; XR FOREARM RIGHT 2 VIEWS; ;  5/7/2025 4:59 pm; 5/7/2025 5:07 pm; 5/7/2025 5:09 pm   INDICATION: Signs/Symptoms:r/o septic joint/osteomyelitis. History of both Hodgkin lymphoma and sickle cell disease complicated by recurrent pain crises     COMPARISON: RT PD SHOULDER, CMPLT, MIN 2 VIEWS 10/13/2012   ACCESSION NUMBER(S): [id number]; [id number]; [id number]; [id number]   ORDERING CLINICIAN: ELLIOT CARDENAS   FINDINGS: There is no acute fracture. The acromioclavicular joint and glenohumeral joint are intact. The subacromial space is maintained.   There is a large elbow joint effusion. No discrete underlying fracture or erosions. No malalignment. No significant soft tissue swelling.       1. Large elbow joint effusion without evidence of fracture, malalignment or erosions. If there is clinical concern MRI can be performed for further evaluation of this finding   I personally reviewed the image(s)/study and interpretation by Dave Tate MD (resident).   MACRO: None.   Signed by: Shane Varela 5/8/2025 7:42 AM Dictation workstation:   [id number]    XR elbow right 1-2 views  Result Date: 5/8/2025  Interpreted By:  Shane Varela and Booth Cameron STUDY: XR SHOULDER RIGHT 2+ VIEWS; XR ELBOW RIGHT 1-2 VIEWS; XR HUMERUS RIGHT; XR FOREARM RIGHT 2 VIEWS; ;  5/7/2025 4:59 pm; 5/7/2025 5:07 pm; 5/7/2025 5:09 pm   INDICATION: Signs/Symptoms:r/o septic joint/osteomyelitis. History of both Hodgkin lymphoma and sickle cell disease complicated by recurrent pain crises     COMPARISON: RT PD SHOULDER, CMPLT, MIN 2 VIEWS 10/13/2012   ACCESSION NUMBER(S): [id number];  [id number]; [id number]; [id number]   ORDERING CLINICIAN: ELLIOT CARDENAS   FINDINGS: There is no acute fracture. The acromioclavicular joint and glenohumeral joint are intact. The subacromial space is maintained.   There is a large elbow joint effusion. No discrete underlying fracture or erosions. No malalignment. No significant soft tissue swelling.       1. Large elbow joint effusion without evidence of fracture, malalignment or erosions. If there is clinical concern MRI can be performed for further evaluation of this finding   I personally reviewed the image(s)/study and interpretation by Dave Tate MD (resident).   MACRO: None.   Signed by: Shane Varela 5/8/2025 7:42 AM Dictation workstation:   [id number]    MR humerus right wo IV contrast  Result Date: 5/7/2025  Interpreted By:  Brad Marmolejo and Lawrence Austen STUDY: MRI of the  right  humerus and forearm without contrast dated 5/7/2025.   INDICATION:     Osteomyelitis   COMPARISON: 05/05/2025 and 05/03/2025   ACCESSION NUMBER(S): [id number]; [id number]   ORDERING CLINICIAN: ELLIOT CARDENAS   TECHNIQUE: Multiplanar multisequence MRI of the  right humerus and forearm was performed  without intravenous gadolinium based contrast.  Only  images were obtained as the patient was unable to tolerate the exam.   FINDINGS: Nondiagnostic exam secondary to patient unable to complete imaging.   The  images do demonstrate multifocal areas of abnormal signal intensity within the right posterior and posterolateral peripheral lung.       Nondiagnostic exam secondary to incomplete imaging. Repeat imaging can be obtained with sedation if clinically indicated.   Additionally, the  images demonstrate multifocal areas of abnormal signal intensity within the right lung. Consider correlation with a repeat chest radiograph given that these findings were not characterized on comparison chest radiograph dated 05/05/2025 were CT chest dated  05/03/2025.   MACRO: None   Signed by: Brad Marmolejo 5/7/2025 11:32 AM Dictation workstation:   [id number]    MR radius ulna right wo IV contrast  Result Date: 5/7/2025  Interpreted By:  Brad Marmolejo  and Collin Ruby STUDY: MRI of the  right  humerus and forearm without contrast dated 5/7/2025.   INDICATION:     Osteomyelitis   COMPARISON: 05/05/2025 and 05/03/2025   ACCESSION NUMBER(S): [id number]; [id number]   ORDERING CLINICIAN: ELLIOT CARDENAS   TECHNIQUE: Multiplanar multisequence MRI of the  right humerus and forearm was performed  without intravenous gadolinium based contrast.  Only  images were obtained as the patient was unable to tolerate the exam.   FINDINGS: Nondiagnostic exam secondary to patient unable to complete imaging.   The  images do demonstrate multifocal areas of abnormal signal intensity within the right posterior and posterolateral peripheral lung.       Nondiagnostic exam secondary to incomplete imaging. Repeat imaging can be obtained with sedation if clinically indicated.   Additionally, the  images demonstrate multifocal areas of abnormal signal intensity within the right lung. Consider correlation with a repeat chest radiograph given that these findings were not characterized on comparison chest radiograph dated 05/05/2025 were CT chest dated 05/03/2025.   MACRO: None   Signed by: Brad Marmolejo 5/7/2025 11:32 AM Dictation workstation:   [id number]    Vascular US upper extremity venous duplex right  Result Date: 5/6/2025            [place] [id number]   Tel [phone] and Fax [phone]  Vascular Lab Report VASC US UPPER EXTREMITY VENOUS DUPLEX RIGHT  Patient Name:      AMARIS MCKENZIEZANT      Reading Physician:  [id number] Hira Fernandez MD Study Date:        5/5/2025             Ordering Physician: [id number] ARCEO MRN/PID:           [id number]              Technologist:       Fadia Alcantar RVT Accession#:        [id number]         Technologist 2: Date of Birth/Age: 2000 / 24 years Encounter#:         [id number] Gender:            M Admission Status:  Inpatient            Location Performed: [facility]  Diagnosis/ICD: Pain in right arm-M79.601 CPT Codes:     40967 Peripheral venous duplex scan for DVT Limited  CONCLUSIONS: Right Upper Venous: No evidence of acute deep vein thrombus visualized in the right upper extremity. Left Upper Venous: The subclavian vein demonstrates a normal spontaneous and phasic flow.  Imaging & Doppler Findings:  Right               Compressible Thrombus        Flow Internal Jugular        Yes        None       Pulsatile Subclavian              Yes        None Subclavian Proximal     Yes        None   Spontaneous/Phasic Subclavian Mid          Yes        None Subclavian Distal       Yes        None   Spontaneous/Phasic Axillary                Yes        None       Pulsatile Brachial                Yes        None Cephalic                Yes        None Basilic                 Yes        None  Left              Flow Subclavian Spontaneous/Phasic  [id number] Hira Fernandez MD Electronically signed by [id number] Hira Fernandez MD on 5/6/2025 at 10:57:08 PM  ** Final **     Transthoracic Echo Complete  Result Date: 5/6/2025   [facility], [address] [place] [address]                Tel [phone] and Fax [phone] TRANSTHORACIC ECHOCARDIOGRAM REPORT  Patient Name:       AMARIS Armendariz Physician:    [id number] Dipti Carpenter MD Study Date:         5/6/2025            Ordering Provider:    [id number] LINDY ARCEO MRN/PID:            [id number]            Fellow: Accession#:         [id number]        Nurse: Date of Birth/Age:  2000 / 24       Sonographer:          Jadyn Scott                     years                                     RDCS Gender assigned at  M                   Additional Staff:     Lyndon Reed Birth:                                                        RDCS Height:             187.96 cm           Admit Date:           5/3/2025 Weight:             72.58 kg            Admission Status:     Inpatient -                                                               Routine BSA / BMI:          1.98 m2 / 20.54     Encounter#:           [id number]                     kg/m2 Blood Pressure:     129/78 mmHg         Department Location:  [facility]                                                               Non Invasive Study Type:    TRANSTHORACIC ECHO (TTE) COMPLETE Diagnosis/ICD: 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 Study Detail: The following Echo studies were performed: 2D, M-Mode, Doppler and               color flow. Technically challenging study due to body habitus and               prominent lung artifact.  PHYSICIAN INTERPRETATION: Left Ventricle: Left ventricular ejection fraction is normal, calculated by Corado's biplane at 63%. There is mild eccentric left ventricular hypertrophy. There are no regional left ventricular wall motion abnormalities. The left ventricular cavity size is severely dilated. There is normal septal and normal posterior left ventricular wall thickness. There is a false tendon visualized in the left ventricle. Spectral Doppler shows a normal pattern of left ventricular diastolic filling. Left Atrium: The left atrial size is moderately dilated. Right Ventricle: The right ventricle is mildly enlarged. There is normal right ventricular global systolic function.  Right Atrium: The right atrium is mildly dilated. Aortic Valve: The aortic valve is probably trileaflet. The aortic valve dimensionless index is 0.68. There is no evidence of aortic valve regurgitation. The peak instantaneous gradient of the aortic valve is 18 mmHg. The mean gradient of the aortic valve is 9 mmHg. Mitral Valve: The mitral valve is normal in structure. The peak instantaneous gradient of the mitral valve is 6 mmHg. There is trace mitral valve regurgitation. Tricuspid Valve: The tricuspid valve is structurally normal. There is trace tricuspid regurgitation. The right ventricular systolic pressure is unable to be estimated. Pulmonic Valve: The pulmonic valve is structurally normal. There is physiologic pulmonic valve regurgitation. Pericardium: Trivial pericardial effusion. Aorta: The aortic root is normal. Systemic Veins: The inferior vena cava appears normal in size, with IVC inspiratory collapse greater than 50%. In comparison to the previous echocardiogram(s): Compared with study dated 8/24/2024, no significant change Note the prior exam included an agitated saline contrast study that demonstrated delayed appearance of contrast on the left side consistent with intrapulmonary shunting. No agitated saline contrast study was done today.  CONCLUSIONS:  1. Left ventricular ejection fraction is normal, calculated by Corado's biplane at 63%.  2. Left ventricular cavity size is severely dilated.  3. There is normal right ventricular global systolic function.  4. Mildly enlarged right ventricle.  5. The left atrial size is moderately dilated.  6. Compared with study dated 8/24/2024, no significant change Note the prior exam included an agitated saline contrast study that demonstrated delayed appearance of contrast on the left side consistent with intrapulmonary shunting. No agitated saline contrast study was done today. QUANTITATIVE DATA SUMMARY:  2D MEASUREMENTS:           Normal Ranges: Ao Root d:        3.20 cm   (2.0-3.7cm) LAs:             3.50 cm   (2.7-4.0cm) IVSd:            1.00 cm   (0.6-1.1cm) LVPWd:           1.00 cm   (0.6-1.1cm) LVIDd:           6.10 cm   (3.9-5.9cm) LVIDs:           4.20 cm LV Mass Index:   128 g/m2 LVEDV Index:     107 ml/m2 LV % FS          31.1 %  LEFT ATRIUM:                  Normal Ranges: LA Vol A4C:        77.0 ml    (22+/-6mL/m2) LA Vol A2C:        82.7 ml LA Vol BP:         83.4 ml LA Vol Index A4C:  38.9ml/m2 LA Vol Index A2C:  41.8 ml/m2 LA Vol Index BP:   42.2 ml/m2 LA Area A4C:       23.5 cm2 LA Area A2C:       23.3 cm2 LA Major Axis A4C: 6.1 cm LA Major Axis A2C: 5.6 cm  RIGHT ATRIUM:                 Normal Ranges: RA Vol A4C:        68.0 ml    (8.3-19.5ml) RA Vol Index A4C:  34.4 ml/m2 RA Area A4C:       20.3 cm2 RA Major Axis A4C: 5.2 cm  AORTA MEASUREMENTS:         Normal Ranges: Asc Ao, d:          3.00 cm (2.1-3.4cm)  LV SYSTOLIC FUNCTION:                      Normal Ranges: EF-A4C View:    62 % (>=55%) EF-A2C View:    65 % EF-Biplane:     63 % LV EF Reported: 63 %  LV DIASTOLIC FUNCTION:            Normal Ranges: MV Peak E:             1.24 m/s   (0.7-1.2 m/s) MV Peak A:             0.78 m/s   (0.42-0.7 m/s) E/A Ratio:             1.58       (1.0-2.2) MV e'                  0.144 m/s  (>8.0) MV lateral e'          0.16 m/s MV medial e'           0.12 m/s MV A Dur:              77.00 msec E/e' Ratio:            8.64       (<8.0) MV DT:                 177 msec   (150-240 msec) PulmV Sys Abel:         75.30 cm/s PulmV Pisano Abel:        54.60 cm/s PulmV S/D Abel:         1.30 PulmV A Revs Abel:      22.00 cm/s PulmV A Revs Dur:      92.00 msec  MITRAL VALVE:          Normal Ranges: MV Vmax:      1.25 m/s (<=1.3m/s) MV peak P.2 mmHg (<5mmHg) MV mean PG:   3.0 mmHg (<2mmHg) MV DT:        177 msec (150-240msec)  AORTIC VALVE:                      Normal Ranges: AoV Vmax:                2.12 m/s  (<=1.7m/s) AoV Peak P.0 mmHg (<20mmHg) AoV Mean PG:              9.0 mmHg  (1.7-11.5mmHg) LVOT Max Abel:            1.46 m/s  (<=1.1m/s) AoV VTI:                 39.00 cm  (18-25cm) LVOT VTI:                26.40 cm LVOT Diameter:           2.20 cm   (1.8-2.4cm) AoV Area, VTI:           2.57 cm2  (2.5-5.5cm2) AoV Area,Vmax:           2.62 cm2  (2.5-4.5cm2) AoV Dimensionless Index: 0.68  RIGHT VENTRICLE: RV Basal 4.65 cm RV Mid   2.70 cm TAPSE:   26.4 mm RV s'    0.18 m/s  TRICUSPID VALVE/RVSP:         Normal Ranges: Est. RA Pressure:     3 mmHg IVC Diam:             1.30 cm  PULMONIC VALVE:          Normal Ranges: PV Accel Time:  79 msec  (>120ms) PV Max Abel:     1.3 m/s  (0.6-0.9m/s) PV Max P.1 mmHg  PULMONARY VEINS: PulmV A Revs Dur: 92.00 msec PulmV A Revs Abel: 22.00 cm/s PulmV Pisano Abel:   54.60 cm/s PulmV S/D Abel:    1.30 PulmV Sys Abel:    75.30 cm/s  [id number] Dipti Carpenter MD Electronically signed on 2025 at 5:47:40 PM  ** Final **     ECG 12 lead  Result Date: 2025  Normal sinus rhythm Rightward axis Cannot rule out Inferior infarct , age undetermined Cannot rule out Anterior infarct , age undetermined ST & T wave abnormality, consider lateral ischemia Abnormal ECG When compared with ECG of 25-DEC-2024 10:13, Significant changes have occurred Confirmed by Denys Ca ([id number]) on 2025 2:53:07 PM    XR chest 1 view  Result Date: 2025  Interpreted By:  Nba Robin, STUDY: XR CHEST 1 VIEW;  2025 3:37 pm   INDICATION: Signs/Symptoms:sickle cell, O2 requirement, re-evaluation.     COMPARISON: Chest radiograph dated 2025 and chest CT dated 2025   ACCESSION NUMBER(S): [id number]   ORDERING CLINICIAN: LINDY ARCEO   FINDINGS: AP radiograph of the chest was provided       CARDIOMEDIASTINAL SILHOUETTE: Cardiomediastinal silhouette is normal in size and configuration.   LUNGS: Lungs are clear.   ABDOMEN: No remarkable upper abdominal findings.   BONES: No acute osseous changes.       1.  No evidence of acute cardiopulmonary process.        MACRO: None   Signed by: Nba Robin 5/5/2025 4:32 PM Dictation workstation:   [id number]    CT angio chest for pulmonary embolism  Result Date: 5/4/2025  Interpreted By:  Ed Wren and Nakamoto Kent STUDY: CT ANGIO CHEST FOR PULMONARY EMBOLISM;  5/3/2025 8:05 pm   INDICATION: Signs/Symptoms:hypoxia r/o pulmonary embolism.     COMPARISON: CT PE 12/22/2024, CT abdomen 09/30/2024   ACCESSION NUMBER(S): [id number]   ORDERING CLINICIAN: BILLY CLOUD   TECHNIQUE: Helical data acquisition of the chest was obtained after intravenous administration of 80 ML Omnipaque 350, as per PE protocol. Images were reformatted in coronal and sagittal planes. Axial and coronal maximum intensity projection (MIP) images were created and reviewed.   FINDINGS: POTENTIAL LIMITATIONS OF THE STUDY: None   HEART AND VESSELS: There are no discrete filling defects within main pulmonary artery and its branches to suggest acute pulmonary embolism. Main pulmonary artery and its branches are normal in caliber.   The thoracic aorta normal in course and caliber. Although, the study is not tailored for evaluation of aorta, there is no definite evidence of acute aortic pathology. No coronary artery calcifications are seen. Please note, the study is not optimized for evaluation of coronary arteries.   There is mild cardiomegaly with multichamber involvement. There is no pericardial effusion seen.   MEDIASTINUM AND ANA, LOWER NECK AND AXILLA: The visualized thyroid gland is within normal limits. No evidence of thoracic lymphadenopathy by CT criteria. Residual thymic tissue is noted. Esophagus appears within normal limits as seen.   LUNGS AND AIRWAYS:   Layering secretions within the right posterolateral trachea. Stable 0.4 cm pulmonary nodule within the left lower lobe compared to prior exam and exams dating back to 2020 (series 403, image 151). Previously visualized nodular opacity within the posterior right middle lobe is not evident on  this exam. Bibasilar dependent ground-glass opacities are similar compared to prior exam. The bilateral lungs are otherwise without evidence of focal consolidation, pleural effusion, or pneumothorax.     UPPER ABDOMEN: Atrophic appearing spleen. Stomach is distended with contents again. Otherwise, the visualized subdiaphragmatic structures demonstrate no remarkable findings.   CHEST WALL AND OSSEOUS STRUCTURES: Chest wall is within normal limits. No acute osseous pathology.There are no suspicious osseous lesions.Mild multilevel degenerative changes within visualized spine.       1. No evidence of acute pulmonary embolism. 2. Similar ground-glass bibasilar dependent opacities that most likely represent atelectasis. However, acute chest syndrome is in the differential in the appropriate clinical setting. 3. Interval resolution of the prior focal nodular opacity within the posterior right middle lobe.   I personally reviewed the images/study and I agree with the findings as stated by Gil Bonilla MD. This study was interpreted at [facility], [place], [place].   MACRO: None   Signed by: Ed Wren 5/4/2025 7:44 AM Dictation workstation:   [id number]    XR chest 1 view  Result Date: 5/3/2025  Interpreted By:  Omi Petty, STUDY: XR CHEST 1 VIEW;  5/3/2025 1:12 pm   INDICATION: Signs/Symptoms:hypoxia, r/o acute chest.     COMPARISON: Chest radiograph 01/19/2025 CT chest 12/22/2020   ACCESSION NUMBER(S): [id number]   ORDERING CLINICIAN: ESTEFANY IRAHETA   FINDINGS: AP radiograph of the chest was provided.       CARDIOMEDIASTINAL SILHOUETTE: Cardiomediastinal silhouette is normal in size and configuration.   LUNGS: There is no consolidation, pleural effusion, or pneumothorax.   ABDOMEN: No remarkable upper abdominal findings.   BONES: No acute osseous changes.       No evidence of acute cardiopulmonary process.     MACRO: None   Signed by: Omi Petty 5/3/2025 1:18 PM Dictation  workstation:   [id number]         Assessment/Plan   Introduction to Integrative Medicine:  Spoke with pt at bedside. Patient seemed to appreciate the extra layer of support.   Integrative Medicine was introduced as a service for patients with serious illness to help with symptoms through non-pharmacological management, such as mindfulness, acupuncture, and gentle bodywork. Such interventions can assist with symptoms such as anxiety, fatigue, nausea, depression and pain.     The [facility] Integrative Medicine Symptom Management program offers multi-disciplinary supervised care of cancer patients using Integrative Modalities billed to insurance using NCCN and SIO/ASCO guideline-driven practices.    Integrative hematology/oncology consult team introduced to pt. Non-pharmacological symptom management interventions reviewed, including: Reiki, meditation, mindfulness practices, guided imagery, as well as acupuncture, acupressure, and gentle bodywork. Pt declined integrative heme/onc services. Music therapy, art therapy, chaplaincy and pet therapy offered to pt, pt declined.       Sickle cell disease with acute on chronic pain:   pain related to vaso-occlusive crisis  Defer to primary team for adequate PO/IV pain regimen   Recommend integrative therapy modalities as pt allows:  -Acupuncture; provided pt education today. Pt declined   -Acupressure - pt declined   -Gentle bodywork and stretching as tolerated - pt declined     -Art therapy - pt declined   -Music therapy - pt declined   -Chaplaincy - pt declined   -Pet therapy - pt declined     Integrative hematology/oncology will sign off at this time, given pt's decline in participation of integrative medicine interventions.  Please don't hesitate to page us if any further questions arise. Thank you for allowing us to participate in the care of this patient.       Brooklyn Last, APRN-CNP (available by [employer] [place])  [facility]  [facility]  Inpatient Integrative Medicine      I spent 45 minutes in the care of this patient which included chart review, interviewing patient/family, discussion with primary team, coordination of care, and documentation.     Medical Decision Making was high level due to high complexity of problems, extensive data review, and high risk of management/treatment.                      [1]   Family History  Problem Relation Name Age of Onset    Sickle cell trait Mother      Diabetes Mother      Sickle cell trait Father      Lung cancer Brother     [2] aspirin, 81 mg, oral, BID  calcium carbonate-vitamin D3, 1 tablet, oral, BID  DAPTOmycin (Cubicin) 435 mg in sodium chloride 0.9% 50 mL IV, 6 mg/kg, intravenous, q24h  ertapenem, 1 g, intravenous, q24h  folic acid, 1 mg, oral, Daily  lidocaine, 2 patch, transdermal, Daily  oxyCODONE ER, 20 mg, oral, q12h REYNALDO  polyethylene glycol, 17 g, oral, Daily  sennosides-docusate sodium, 2 tablet, oral, BID  [3]    [4] PRN medications: diphenhydrAMINE, HYDROmorphone, HYDROmorphone, ondansetron ODT **OR** [DISCONTINUED] ondansetron, oxygen, pseudoephedrine

## 2025-05-20 NOTE — CARE PLAN
The patient's goals for the shift include      Problem: Pain - Adult  Goal: Verbalizes/displays adequate comfort level or baseline comfort level  Outcome: Progressing     Problem: Safety - Adult  Goal: Free from fall injury  Outcome: Progressing     Problem: Discharge Planning  Goal: Discharge to home or other facility with appropriate resources  Outcome: Progressing     Problem: Chronic Conditions and Co-morbidities  Goal: Patient's chronic conditions and co-morbidity symptoms are monitored and maintained or improved  Outcome: Progressing     Problem: Nutrition  Goal: Nutrient intake appropriate for maintaining nutritional needs  Outcome: Progressing     Problem: Pain  Goal: Takes deep breaths with improved pain control throughout the shift  Outcome: Progressing  Goal: Turns in bed with improved pain control throughout the shift  Outcome: Progressing  Goal: Walks with improved pain control throughout the shift  Outcome: Progressing  Goal: Performs ADL's with improved pain control throughout shift  Outcome: Progressing  Goal: Participates in PT with improved pain control throughout the shift  Outcome: Progressing  Goal: Free from opioid side effects throughout the shift  Outcome: Progressing  Goal: Free from acute confusion related to pain meds throughout the shift  Outcome: Progressing     The clinical goals for the shift include Patient will remain safe and free from falls throughout the shift

## 2025-05-20 NOTE — PROGRESS NOTES
"Joellen Narayan is a 24 y.o. male on day 3 of admission presenting with Sickle cell pain crisis (Multi).    Subjective   Joellen is doing fine this morning. States his pain is still only in his elbow, and some in his R shoulder. We discussed that his long acting pain meds will take a couple of days before they take full effect. He denies any chest pain, fevers/chills, SOB, n/v/d/c, or abdominal pain, s/sx of bleeding. ROS: A complete review of systems was performed and is negative except for as mentioned above in the HPI     Objective     Physical Exam  Vitals reviewed.   Constitutional:       Appearance: Normal appearance.   HENT:      Head: Normocephalic and atraumatic.      Nose: Nose normal.      Mouth/Throat:      Mouth: Mucous membranes are moist.      Pharynx: Oropharynx is clear.   Eyes:      General: Scleral icterus present.      Extraocular Movements: Extraocular movements intact.      Pupils: Pupils are equal, round, and reactive to light.   Cardiovascular:      Rate and Rhythm: Normal rate and regular rhythm.      Pulses: Normal pulses.      Heart sounds: Normal heart sounds.   Pulmonary:      Effort: Pulmonary effort is normal.      Breath sounds: Normal breath sounds.   Abdominal:      General: Bowel sounds are normal.      Palpations: Abdomen is soft.   Musculoskeletal:         General: Normal range of motion.   Skin:     General: Skin is warm.      Comments: +R elbow dressing c/d/I   Neurological:      General: No focal deficit present.      Mental Status: He is alert and oriented to person, place, and time. Mental status is at baseline.   Psychiatric:         Mood and Affect: Mood normal.         Behavior: Behavior normal.       Last Recorded Vitals  Blood pressure 118/75, pulse 76, temperature 36.7 °C (98.1 °F), resp. rate 18, height 1.88 m (6' 2\"), weight 68.3 kg (150 lb 9.2 oz), SpO2 94%.  Intake/Output last 3 Shifts:  I/O last 3 completed shifts:  In: 140 (2 mL/kg) [P.O.:140]  Out: - (0 mL/kg) "   Weight: 68.3 kg     Results for orders placed or performed during the hospital encounter of 05/17/25 (from the past 24 hours)   CBC and Auto Differential   Result Value Ref Range    WBC 9.8 4.4 - 11.3 x10*3/uL    nRBC 1.2 (H) 0.0 - 0.0 /100 WBCs    RBC 2.59 (L) 4.50 - 5.90 x10*6/uL    Hemoglobin 7.6 (L) 13.5 - 17.5 g/dL    Hematocrit 22.2 (L) 41.0 - 52.0 %    MCV 86 80 - 100 fL    MCH 29.3 26.0 - 34.0 pg    MCHC 34.2 32.0 - 36.0 g/dL    RDW 19.9 (H) 11.5 - 14.5 %    Platelets 810 (H) 150 - 450 x10*3/uL    Neutrophils % 49.8 40.0 - 80.0 %    Immature Granulocytes %, Automated 0.6 0.0 - 0.9 %    Lymphocytes % 27.0 13.0 - 44.0 %    Monocytes % 12.0 2.0 - 10.0 %    Eosinophils % 10.0 0.0 - 6.0 %    Basophils % 0.6 0.0 - 2.0 %    Neutrophils Absolute 4.88 1.20 - 7.70 x10*3/uL    Immature Granulocytes Absolute, Automated 0.06 0.00 - 0.70 x10*3/uL    Lymphocytes Absolute 2.64 1.20 - 4.80 x10*3/uL    Monocytes Absolute 1.17 (H) 0.10 - 1.00 x10*3/uL    Eosinophils Absolute 0.98 (H) 0.00 - 0.70 x10*3/uL    Basophils Absolute 0.06 0.00 - 0.10 x10*3/uL   Comprehensive Metabolic Panel   Result Value Ref Range    Glucose 89 74 - 99 mg/dL    Sodium 136 136 - 145 mmol/L    Potassium 4.7 3.5 - 5.3 mmol/L    Chloride 100 98 - 107 mmol/L    Bicarbonate 28 21 - 32 mmol/L    Anion Gap 13 10 - 20 mmol/L    Urea Nitrogen 10 6 - 23 mg/dL    Creatinine 0.58 0.50 - 1.30 mg/dL    eGFR >90 >60 mL/min/1.73m*2    Calcium 10.1 8.6 - 10.6 mg/dL    Albumin 4.0 3.4 - 5.0 g/dL    Alkaline Phosphatase 227 (H) 33 - 120 U/L    Total Protein 6.8 6.4 - 8.2 g/dL     (H) 9 - 39 U/L    Bilirubin, Total 7.4 (H) 0.0 - 1.2 mg/dL    ALT 80 (H) 10 - 52 U/L   Lactate Dehydrogenase   Result Value Ref Range     (H) 84 - 246 U/L   Reticulocytes   Result Value Ref Range    Retic % 16.2 (H) 0.5 - 2.0 %    Retic Absolute 0.420 (H) 0.022 - 0.118 x10*6/uL    Reticulocyte Hemoglobin 32 28 - 38 pg    Immature Retic fraction 13.0 <=16.0 %   C-reactive protein    Result Value Ref Range    C-Reactive Protein 1.60 (H) <1.00 mg/dL     *Note: Due to a large number of results and/or encounters for the requested time period, some results have not been displayed. A complete set of results can be found in Results Review.       Scheduled medications  Scheduled Medications[1]  Continuous medications  Continuous Medications[2]  PRN medications  PRN Medications[3]      Assessment & Plan  Sickle cell pain crisis (Multi)    Joellen Narayan is a 24 y.o. 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 Hgb and lysis labs stable at baseline. Pt admitted for further management of pain. Started on IV dilaudid per care plan. ID consulted for atb ordering, rec Xray R elbow and ortho consult. Xray R elbow (5/18) showing postsurgical changes of the elbow with large joint effusion and soft tissue edema. Ortho consulted 5/18, rec no acute ortho intervention. Added oral dilaudid into pain regimen 5/19 in attempt to wean IV dilaudid. DC home with resumed O2 and home care with IV antibiotics pending improvement in pain.     Updates 5/20:  - Started Oxycontin 20mg BID on 5/19 (PA approved)  - Added oral dilaudid into pain regimen 5/19 in attempt to wean IV dilaudid  - plan to start weaning pain meds tomorrow     # Septic Arthritis    - Recently admitted and discharged from hospital on 5/16 and treated for SA  - Ortho consulted 5/8, s/p aspirate (>44k  cells)   - MRI w/anesthesia (5/9) R radius/humerus showing concern for septic arthritis, osteomyelitis, myositis, severe muscle and subcutaneous soft tissue edema, and possible cellulitis   - s/p R elbow I&D 5/10 with ortho    - Tissue/ wound cultures NGTD, Fungal Cultures NGTD 5/12  - Final Ortho recs from 5/12; WBAT RUE, Mepilex remove POD7 (5/17/25), drain removed 5/12, require 6 weeks total of DVT prophylaxis from an orthopedic standpoint, Calcium/Vitamin D 500mg-400IU BID for 6 weeks (continued on admit), follow up w/ Dr. Tello 2 weeks after surgery for post-operative appointment (scheduled 6/9)   - Final ID recs for long term antibiotics on 5/13; IV daptomycin 6mg/kg q24h and ertapenem 1g q24h until 6/20/25--> continued on admission  - ID consulted 5/18 for atb ordering, rec Xray R elbow and ortho consult, weekly CK, signed off 5/19  - Xray R elbow (5/18) showing postsurgical changes of the elbow with large joint effusion and soft tissue edema  - Ortho consulted 5/18, rec no acute ortho intervention  - CRP 2.96, ESR 8, CK 49 (5/18) --> CRP 1.6 (5/20)     # Hgb SS disease    # Post-surgical pain  - OARRS reviewed on admission, no aberrancies noted    - Hgb BL ~8; Hgb 7.2 (5/18)--> 7.6 (5/19)  - LDH BL ~500;  (5/18)--> 451 (5/19)  - Tbili BL ~8; Tbli 8.3 (5/18)--> 7.9 (5/19)  - Pain is likely post-surgical + sickle cell pain; when pt was dc on 5/16 his MS contin wasn't approved with PA and he didn't want to remain admitted (also was discharged right off IV dilaudid)--> so current pain is likely withdrawal-related  - s/p IV dilaudid 4mg q2hrs PRN severe pain (5/17)  - Increased IV dilaudid to 6mg q2hrs PRN severe pain (5/18-)  - Started Oxycontin 20mg BID (5/19); PA approved  - Added PO dilaudid 8mg q4hrs PRN breakthrough pain- ordered specifically so that pt gets 2 does of IV dilaudid followed by oral dilaudid in attempt to wean IV dilaudid while Oxycontin builds up  - Lidocaine patches x2  - Continue  folic acid 1mg daily  - Utox (5/17): pending collection  - CXR (5/17): negative for acute process  - Bowel regimen for opioid-induced constipation with DocuSenna 2tabs BID and Miralax daily     # Hx of nocturnal oxygen dependence   - On 2-3L at night, however patient reports daytime hypoxia as well in the past, currently on 2L 5/17   - CXR (5/17): negative for acute process     # Hx choledocholithiasis 7/2024 -improved    - Worsening hemolysis in setting of active infection could be contributing to increased hyperbilirubinemia    - July 2024 s/p ERCP with biliary sphincterotomy where sludge and stones were removed, achieving complete clearance    - 1/31/25 was planned for cholecystectomy with Dr. Dove but no show on day of surgery and again 2/13 and 2/27    - Tbili stable     # Hx Priapism   - No active issues on admit    - Hx previously drained by urology    - Continue home Sudafed 30mg daily PRN priapism     # Nodular lymphocyte predominant Hodgkins lymphoma (NLPHL)     - Follows with Dr. Stoll   - s/p Rituxan and prednisone q3 weeks (C1 1/18/24, C2 2/8/24, Missed C3 d/t sickle cell pain crisis, C4 4/4/24, C5 5/16/24, C6 6/7/24)    - 3/13 No show to onc appt    - 4/9 PET showed interval development of new FDG focus in mid abdomen   - Outpatient appointment scheduled 5/27       # Prophy   - ASA 81mg BID x6 weeks post-op (stop 6/21)  - SCDs, encouraged ambulation       DISPO:  - Code Status: Full Code (confirmed on admission)    - Access: LUE PICC  - DC home with resumed O2 and home care with IV antibiotics pending improvement in pain    - NOK: Melissa, mother, [phone]    - Pulm FUV 5/21, Onc FUV 5/27, Ortho FUV 6/9, ID FUV 6/17, Sickle Cell FUV 7/9    I spent >60 minutes in the professional and overall care of this patient    Assessment and plan as above discussed with attending physician Dr. Deanna Couch PAKenzieC         [1] aspirin, 81 mg, oral, BID  calcium carbonate-vitamin D3, 1  tablet, oral, BID  DAPTOmycin (Cubicin) 435 mg in sodium chloride 0.9% 50 mL IV, 6 mg/kg, intravenous, q24h  ertapenem, 1 g, intravenous, q24h  folic acid, 1 mg, oral, Daily  lidocaine, 2 patch, transdermal, Daily  oxyCODONE ER, 20 mg, oral, q12h REYNALDO  polyethylene glycol, 17 g, oral, Daily  sennosides-docusate sodium, 2 tablet, oral, BID     [2]    [3] PRN medications: diphenhydrAMINE, HYDROmorphone, HYDROmorphone, ondansetron ODT **OR** [DISCONTINUED] ondansetron, oxygen, pseudoephedrine

## 2025-05-21 ENCOUNTER — APPOINTMENT (OUTPATIENT)
Dept: PULMONOLOGY | Facility: HOSPITAL | Age: 25
End: 2025-05-21
Payer: COMMERCIAL

## 2025-05-21 LAB
ALBUMIN SERPL BCP-MCNC: 4.3 G/DL (ref 3.4–5)
ALP SERPL-CCNC: 233 U/L (ref 33–120)
ALT SERPL W P-5'-P-CCNC: 91 U/L (ref 10–52)
ANION GAP SERPL CALC-SCNC: 12 MMOL/L (ref 10–20)
AST SERPL W P-5'-P-CCNC: 117 U/L (ref 9–39)
BASOPHILS # BLD AUTO: 0.07 X10*3/UL (ref 0–0.1)
BASOPHILS NFR BLD AUTO: 0.5 %
BILIRUB SERPL-MCNC: 8.5 MG/DL (ref 0–1.2)
BUN SERPL-MCNC: 10 MG/DL (ref 6–23)
CALCIUM SERPL-MCNC: 10.2 MG/DL (ref 8.6–10.6)
CHLORIDE SERPL-SCNC: 97 MMOL/L (ref 98–107)
CO2 SERPL-SCNC: 31 MMOL/L (ref 21–32)
CREAT SERPL-MCNC: 0.6 MG/DL (ref 0.5–1.3)
EGFRCR SERPLBLD CKD-EPI 2021: >90 ML/MIN/1.73M*2
EOSINOPHIL # BLD AUTO: 1.23 X10*3/UL (ref 0–0.7)
EOSINOPHIL NFR BLD AUTO: 9.3 %
ERYTHROCYTE [DISTWIDTH] IN BLOOD BY AUTOMATED COUNT: 19.6 % (ref 11.5–14.5)
GLUCOSE BLD MANUAL STRIP-MCNC: 91 MG/DL (ref 74–99)
GLUCOSE SERPL-MCNC: 71 MG/DL (ref 74–99)
HCT VFR BLD AUTO: 24.1 % (ref 41–52)
HGB BLD-MCNC: 8.1 G/DL (ref 13.5–17.5)
HGB RETIC QN: 31 PG (ref 28–38)
IMM GRANULOCYTES # BLD AUTO: 0.08 X10*3/UL (ref 0–0.7)
IMM GRANULOCYTES NFR BLD AUTO: 0.6 % (ref 0–0.9)
IMMATURE RETIC FRACTION: 7.4 %
LDH SERPL L TO P-CCNC: 450 U/L (ref 84–246)
LYMPHOCYTES # BLD AUTO: 2.96 X10*3/UL (ref 1.2–4.8)
LYMPHOCYTES NFR BLD AUTO: 22.4 %
MCH RBC QN AUTO: 29.1 PG (ref 26–34)
MCHC RBC AUTO-ENTMCNC: 33.6 G/DL (ref 32–36)
MCV RBC AUTO: 87 FL (ref 80–100)
MONOCYTES # BLD AUTO: 1.48 X10*3/UL (ref 0.1–1)
MONOCYTES NFR BLD AUTO: 11.2 %
NEUTROPHILS # BLD AUTO: 7.41 X10*3/UL (ref 1.2–7.7)
NEUTROPHILS NFR BLD AUTO: 56 %
NRBC BLD-RTO: 0.8 /100 WBCS (ref 0–0)
PLATELET # BLD AUTO: 868 X10*3/UL (ref 150–450)
POTASSIUM SERPL-SCNC: 4.6 MMOL/L (ref 3.5–5.3)
PROT SERPL-MCNC: 7.5 G/DL (ref 6.4–8.2)
RBC # BLD AUTO: 2.78 X10*6/UL (ref 4.5–5.9)
RETICS #: 0.41 X10*6/UL (ref 0.02–0.12)
RETICS/RBC NFR AUTO: 14.7 % (ref 0.5–2)
SODIUM SERPL-SCNC: 135 MMOL/L (ref 136–145)
WBC # BLD AUTO: 13.2 X10*3/UL (ref 4.4–11.3)

## 2025-05-21 PROCEDURE — 85045 AUTOMATED RETICULOCYTE COUNT: CPT

## 2025-05-21 PROCEDURE — 2500000001 HC RX 250 WO HCPCS SELF ADMINISTERED DRUGS (ALT 637 FOR MEDICARE OP): Performed by: NURSE PRACTITIONER

## 2025-05-21 PROCEDURE — 2500000004 HC RX 250 GENERAL PHARMACY W/ HCPCS (ALT 636 FOR OP/ED)

## 2025-05-21 PROCEDURE — 1170000001 HC PRIVATE ONCOLOGY ROOM DAILY

## 2025-05-21 PROCEDURE — 85025 COMPLETE CBC W/AUTO DIFF WBC: CPT

## 2025-05-21 PROCEDURE — 80053 COMPREHEN METABOLIC PANEL: CPT

## 2025-05-21 PROCEDURE — 2500000004 HC RX 250 GENERAL PHARMACY W/ HCPCS (ALT 636 FOR OP/ED): Mod: JZ,TB

## 2025-05-21 PROCEDURE — 99233 SBSQ HOSP IP/OBS HIGH 50: CPT

## 2025-05-21 PROCEDURE — RXMED WILLOW AMBULATORY MEDICATION CHARGE

## 2025-05-21 PROCEDURE — 2500000005 HC RX 250 GENERAL PHARMACY W/O HCPCS

## 2025-05-21 PROCEDURE — 2500000001 HC RX 250 WO HCPCS SELF ADMINISTERED DRUGS (ALT 637 FOR MEDICARE OP)

## 2025-05-21 PROCEDURE — 82947 ASSAY GLUCOSE BLOOD QUANT: CPT

## 2025-05-21 PROCEDURE — 83615 LACTATE (LD) (LDH) ENZYME: CPT

## 2025-05-21 RX ADMIN — SENNOSIDES AND DOCUSATE SODIUM 2 TABLET: 50; 8.6 TABLET ORAL at 09:18

## 2025-05-21 RX ADMIN — HYDROMORPHONE HYDROCHLORIDE 4 MG: 2 INJECTION, SOLUTION INTRAMUSCULAR; INTRAVENOUS; SUBCUTANEOUS at 16:27

## 2025-05-21 RX ADMIN — DAPTOMYCIN 435 MG: 500 INJECTION, POWDER, LYOPHILIZED, FOR SOLUTION INTRAVENOUS at 14:11

## 2025-05-21 RX ADMIN — ASPIRIN 81 MG CHEWABLE TABLET 81 MG: 81 TABLET CHEWABLE at 09:15

## 2025-05-21 RX ADMIN — HYDROMORPHONE HYDROCHLORIDE 8 MG: 4 TABLET ORAL at 20:23

## 2025-05-21 RX ADMIN — HYDROMORPHONE HYDROCHLORIDE 8 MG: 4 TABLET ORAL at 14:11

## 2025-05-21 RX ADMIN — LIDOCAINE 4% 2 PATCH: 40 PATCH TOPICAL at 18:27

## 2025-05-21 RX ADMIN — HYDROMORPHONE HYDROCHLORIDE 4 MG: 2 INJECTION, SOLUTION INTRAMUSCULAR; INTRAVENOUS; SUBCUTANEOUS at 18:27

## 2025-05-21 RX ADMIN — HYDROMORPHONE HYDROCHLORIDE 4 MG: 2 INJECTION, SOLUTION INTRAMUSCULAR; INTRAVENOUS; SUBCUTANEOUS at 12:14

## 2025-05-21 RX ADMIN — OXYCODONE HYDROCHLORIDE 30 MG: 20 TABLET, FILM COATED, EXTENDED RELEASE ORAL at 09:15

## 2025-05-21 RX ADMIN — HYDROMORPHONE HYDROCHLORIDE 4 MG: 2 INJECTION, SOLUTION INTRAMUSCULAR; INTRAVENOUS; SUBCUTANEOUS at 05:58

## 2025-05-21 RX ADMIN — Medication 1 TABLET: at 09:15

## 2025-05-21 RX ADMIN — POLYETHYLENE GLYCOL 3350 17 G: 17 POWDER, FOR SOLUTION ORAL at 09:18

## 2025-05-21 RX ADMIN — FOLIC ACID 1 MG: 1 TABLET ORAL at 09:15

## 2025-05-21 RX ADMIN — OXYCODONE HYDROCHLORIDE 30 MG: 20 TABLET, FILM COATED, EXTENDED RELEASE ORAL at 20:22

## 2025-05-21 RX ADMIN — ERTAPENEM SODIUM 1 G: 1 INJECTION INTRAMUSCULAR; INTRAVENOUS at 14:12

## 2025-05-21 RX ADMIN — HYDROMORPHONE HYDROCHLORIDE 8 MG: 4 TABLET ORAL at 08:11

## 2025-05-21 RX ADMIN — HYDROMORPHONE HYDROCHLORIDE 4 MG: 2 INJECTION, SOLUTION INTRAMUSCULAR; INTRAVENOUS; SUBCUTANEOUS at 10:13

## 2025-05-21 RX ADMIN — Medication 1 TABLET: at 20:22

## 2025-05-21 RX ADMIN — HYDROMORPHONE HYDROCHLORIDE 4 MG: 2 INJECTION, SOLUTION INTRAMUSCULAR; INTRAVENOUS; SUBCUTANEOUS at 22:23

## 2025-05-21 RX ADMIN — ASPIRIN 81 MG CHEWABLE TABLET 81 MG: 81 TABLET CHEWABLE at 20:22

## 2025-05-21 RX ADMIN — HYDROMORPHONE HYDROCHLORIDE 8 MG: 4 TABLET ORAL at 01:59

## 2025-05-21 RX ADMIN — HYDROMORPHONE HYDROCHLORIDE 4 MG: 2 INJECTION, SOLUTION INTRAMUSCULAR; INTRAVENOUS; SUBCUTANEOUS at 03:57

## 2025-05-21 ASSESSMENT — COGNITIVE AND FUNCTIONAL STATUS - GENERAL
DAILY ACTIVITIY SCORE: 24
DAILY ACTIVITIY SCORE: 24
MOBILITY SCORE: 24
MOBILITY SCORE: 24
DAILY ACTIVITIY SCORE: 24
MOBILITY SCORE: 24

## 2025-05-21 ASSESSMENT — PAIN SCALES - GENERAL
PAINLEVEL_OUTOF10: 8
PAINLEVEL_OUTOF10: 8
PAINLEVEL_OUTOF10: 10 - WORST POSSIBLE PAIN
PAINLEVEL_OUTOF10: 9
PAINLEVEL_OUTOF10: 10 - WORST POSSIBLE PAIN
PAINLEVEL_OUTOF10: 10 - WORST POSSIBLE PAIN
PAINLEVEL_OUTOF10: 8
PAINLEVEL_OUTOF10: 9

## 2025-05-21 ASSESSMENT — PAIN DESCRIPTION - LOCATION
LOCATION: ARM
LOCATION: ARM
LOCATION: SHOULDER

## 2025-05-21 ASSESSMENT — PAIN - FUNCTIONAL ASSESSMENT
PAIN_FUNCTIONAL_ASSESSMENT: 0-10

## 2025-05-21 ASSESSMENT — PAIN DESCRIPTION - ORIENTATION
ORIENTATION: RIGHT

## 2025-05-21 NOTE — CARE PLAN
Problem: Pain - Adult  Goal: Verbalizes/displays adequate comfort level or baseline comfort level  Outcome: Progressing     Problem: Safety - Adult  Goal: Free from fall injury  Outcome: Progressing     Problem: Discharge Planning  Goal: Discharge to home or other facility with appropriate resources  Outcome: Progressing     Problem: Chronic Conditions and Co-morbidities  Goal: Patient's chronic conditions and co-morbidity symptoms are monitored and maintained or improved  Outcome: Progressing     Problem: Nutrition  Goal: Nutrient intake appropriate for maintaining nutritional needs  Outcome: Progressing     Problem: Pain  Goal: Takes deep breaths with improved pain control throughout the shift  Outcome: Progressing  Goal: Turns in bed with improved pain control throughout the shift  Outcome: Progressing  Goal: Walks with improved pain control throughout the shift  Outcome: Progressing  Goal: Performs ADL's with improved pain control throughout shift  Outcome: Progressing  Goal: Participates in PT with improved pain control throughout the shift  Outcome: Progressing  Goal: Free from opioid side effects throughout the shift  Outcome: Progressing  Goal: Free from acute confusion related to pain meds throughout the shift  Outcome: Progressing     The clinical goals for the shift include Patient will remain safe throughout shift

## 2025-05-21 NOTE — CARE PLAN
The patient's goals for the shift include      The clinical goals for the shift include patient will have decreased pain through end of shift    Over the shift, the patient did make progress toward the following goals. Barriers to progression include continued pain. Recommendations to address these barriers include continue pain medication regimen.

## 2025-05-21 NOTE — PROGRESS NOTES
"Joellen Narayan is a 24 y.o. male on day 4 of admission presenting with Sickle cell pain crisis (Multi).    Subjective   Joellen is doing fine this morning. Mother at bedside. Pt states his pain is uncontrolled still. We discussed that we increased his long acting pain meds last night, and that it will take a couple of doses to take its full effect. Denies chest pain, SOB, N/V/D/C, fever, chills, abdominal pain, s/sx of bleeding. ROS: A complete review of systems was performed and is negative except for as mentioned above in the HPI     Objective     Physical Exam  Vitals reviewed.   Constitutional:       Appearance: Normal appearance.   HENT:      Head: Normocephalic and atraumatic.      Nose: Nose normal.      Mouth/Throat:      Mouth: Mucous membranes are moist.      Pharynx: Oropharynx is clear.   Eyes:      General: Scleral icterus present.      Extraocular Movements: Extraocular movements intact.      Pupils: Pupils are equal, round, and reactive to light.   Cardiovascular:      Rate and Rhythm: Normal rate and regular rhythm.      Pulses: Normal pulses.      Heart sounds: Normal heart sounds.   Pulmonary:      Effort: Pulmonary effort is normal.      Breath sounds: Normal breath sounds.   Abdominal:      General: Bowel sounds are normal.      Palpations: Abdomen is soft.   Musculoskeletal:         General: Normal range of motion.   Skin:     General: Skin is warm.      Comments: +R elbow dressing c/d/I   Neurological:      General: No focal deficit present.      Mental Status: He is alert and oriented to person, place, and time. Mental status is at baseline.   Psychiatric:         Mood and Affect: Mood normal.         Behavior: Behavior normal.       Last Recorded Vitals  Blood pressure 128/79, pulse 80, temperature 37.3 °C (99.1 °F), temperature source Temporal, resp. rate 18, height 1.88 m (6' 2\"), weight 67.4 kg (148 lb 9.4 oz), SpO2 95%.  Intake/Output last 3 Shifts:  I/O last 3 completed shifts:  In: - " (0 mL/kg)   Out: 1400 (20.8 mL/kg) [Urine:1400 (0.9 mL/kg/hr)]  Weight: 67.4 kg     Results for orders placed or performed during the hospital encounter of 05/17/25 (from the past 24 hours)   CBC and Auto Differential   Result Value Ref Range    WBC 13.2 (H) 4.4 - 11.3 x10*3/uL    nRBC 0.8 (H) 0.0 - 0.0 /100 WBCs    RBC 2.78 (L) 4.50 - 5.90 x10*6/uL    Hemoglobin 8.1 (L) 13.5 - 17.5 g/dL    Hematocrit 24.1 (L) 41.0 - 52.0 %    MCV 87 80 - 100 fL    MCH 29.1 26.0 - 34.0 pg    MCHC 33.6 32.0 - 36.0 g/dL    RDW 19.6 (H) 11.5 - 14.5 %    Platelets 868 (H) 150 - 450 x10*3/uL    Neutrophils % 56.0 40.0 - 80.0 %    Immature Granulocytes %, Automated 0.6 0.0 - 0.9 %    Lymphocytes % 22.4 13.0 - 44.0 %    Monocytes % 11.2 2.0 - 10.0 %    Eosinophils % 9.3 0.0 - 6.0 %    Basophils % 0.5 0.0 - 2.0 %    Neutrophils Absolute 7.41 1.20 - 7.70 x10*3/uL    Immature Granulocytes Absolute, Automated 0.08 0.00 - 0.70 x10*3/uL    Lymphocytes Absolute 2.96 1.20 - 4.80 x10*3/uL    Monocytes Absolute 1.48 (H) 0.10 - 1.00 x10*3/uL    Eosinophils Absolute 1.23 (H) 0.00 - 0.70 x10*3/uL    Basophils Absolute 0.07 0.00 - 0.10 x10*3/uL   Comprehensive Metabolic Panel   Result Value Ref Range    Glucose 71 (L) 74 - 99 mg/dL    Sodium 135 (L) 136 - 145 mmol/L    Potassium 4.6 3.5 - 5.3 mmol/L    Chloride 97 (L) 98 - 107 mmol/L    Bicarbonate 31 21 - 32 mmol/L    Anion Gap 12 10 - 20 mmol/L    Urea Nitrogen 10 6 - 23 mg/dL    Creatinine 0.60 0.50 - 1.30 mg/dL    eGFR >90 >60 mL/min/1.73m*2    Calcium 10.2 8.6 - 10.6 mg/dL    Albumin 4.3 3.4 - 5.0 g/dL    Alkaline Phosphatase 233 (H) 33 - 120 U/L    Total Protein 7.5 6.4 - 8.2 g/dL     (H) 9 - 39 U/L    Bilirubin, Total 8.5 (H) 0.0 - 1.2 mg/dL    ALT 91 (H) 10 - 52 U/L   Lactate Dehydrogenase   Result Value Ref Range     (H) 84 - 246 U/L   Reticulocytes   Result Value Ref Range    Retic % 14.7 (H) 0.5 - 2.0 %    Retic Absolute 0.409 (H) 0.022 - 0.118 x10*6/uL    Reticulocyte  Hemoglobin 31 28 - 38 pg    Immature Retic fraction 7.4 <=16.0 %     *Note: Due to a large number of results and/or encounters for the requested time period, some results have not been displayed. A complete set of results can be found in Results Review.       Scheduled medications  Scheduled Medications[1]  Continuous medications  Continuous Medications[2]  PRN medications  PRN Medications[3]      Assessment & Plan  Sickle cell pain crisis (Multi)    Joellen Narayan is a 24 y.o. 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 Hgb and lysis labs stable at baseline. Pt admitted for further management of pain. Started on IV dilaudid per care plan. ID consulted for atb ordering, rec Xray R elbow and ortho consult. Xray R elbow (5/18) showing postsurgical changes of the elbow with large joint effusion and soft tissue edema. Ortho consulted 5/18, rec no acute ortho intervention. Added oral dilaudid into pain regimen 5/19 in attempt to wean IV dilaudid. DC home with resumed O2 and home care with IV antibiotics pending improvement in pain.     Updates 5/21:  - Started Oxycontin 20mg BID on 5/19 (PA approved), increased to 30mg BID (5/20)  - Added oral dilaudid into pain regimen 5/19 in attempt to wean IV dilaudid  - IV dilaudid weaned to 4mg q2h PRN     # Septic Arthritis    - Recently admitted and discharged from hospital on 5/16 and treated for SA  - Ortho consulted 5/8, s/p  aspirate (>44k cells)   - MRI w/anesthesia (5/9) R radius/humerus showing concern for septic arthritis, osteomyelitis, myositis, severe muscle and subcutaneous soft tissue edema, and possible cellulitis   - s/p R elbow I&D 5/10 with ortho    - Tissue/ wound cultures NGTD, Fungal Cultures NGTD 5/12  - Final Ortho recs from 5/12; WBAT RUE, Mepilex remove POD7 (5/17/25), drain removed 5/12, require 6 weeks total of DVT prophylaxis from an orthopedic standpoint, Calcium/Vitamin D 500mg-400IU BID for 6 weeks (continued on admit), follow up w/ Dr. Tello 2 weeks after surgery for post-operative appointment (scheduled 6/9)   - Final ID recs for long term antibiotics on 5/13; IV daptomycin 6mg/kg q24h and ertapenem 1g q24h until 6/20/25--> continued on admission  - ID consulted 5/18 for atb ordering, rec Xray R elbow and ortho consult, weekly CK, signed off 5/19  - Xray R elbow (5/18) showing postsurgical changes of the elbow with large joint effusion and soft tissue edema  - Ortho consulted 5/18, rec no acute ortho intervention  - CRP 2.96, ESR 8, CK 49 (5/18) --> CRP 1.6 (5/20)     # Hgb SS disease    # Post-surgical pain  - OARRS reviewed on admission, no aberrancies noted    - Hgb BL ~8; Hgb 7.2 (5/18)--> 7.6 (5/19)  - LDH BL ~500;  (5/18)--> 451 (5/19)  - Tbili BL ~8; Tbli 8.3 (5/18)--> 7.9 (5/19)  - Pain is likely post-surgical + sickle cell pain; when pt was dc on 5/16 his MS contin wasn't approved with PA and he didn't want to remain admitted (also was discharged right off IV dilaudid)--> so current pain is likely withdrawal-related  - s/p IV dilaudid 4mg q2hrs PRN severe pain (5/17)  - Increased IV dilaudid to 6mg q2hrs PRN severe pain (5/18-5/20)  - decreased IV dilaudid to 4mg q2h PRN for severe pain (5/20-current)  - started Oxycontin 20mg BID (5/19); PA approved, increased to 30mg BID 5/20  - Added PO dilaudid 8mg q4hrs PRN breakthrough pain- ordered specifically so that pt gets 2 does of IV dilaudid  followed by oral dilaudid in attempt to wean IV dilaudid while Oxycontin builds up  - Lidocaine patches x2  - Continue folic acid 1mg daily  - Utox (5/17): pending collection  - CXR (5/17): negative for acute process  - Bowel regimen for opioid-induced constipation with DocuSenna 2tabs BID and Miralax daily     # Hx of nocturnal oxygen dependence   - On 2-3L at night, however patient reports daytime hypoxia as well in the past, currently on 2L 5/17   - CXR (5/17): negative for acute process     # Hx choledocholithiasis 7/2024 -improved    - Worsening hemolysis in setting of active infection could be contributing to increased hyperbilirubinemia    - July 2024 s/p ERCP with biliary sphincterotomy where sludge and stones were removed, achieving complete clearance    - 1/31/25 was planned for cholecystectomy with Dr. Dove but no show on day of surgery and again 2/13 and 2/27    - Tbili stable     # Hx Priapism   - No active issues on admit    - Hx previously drained by urology    - Continue home Sudafed 30mg daily PRN priapism     # Nodular lymphocyte predominant Hodgkins lymphoma (NLPHL)     - Follows with Dr. Stoll   - s/p Rituxan and prednisone q3 weeks (C1 1/18/24, C2 2/8/24, Missed C3 d/t sickle cell pain crisis, C4 4/4/24, C5 5/16/24, C6 6/7/24)    - 3/13 No show to onc appt    - 4/9 PET showed interval development of new FDG focus in mid abdomen   - Outpatient appointment scheduled 5/27       # Prophy   - ASA 81mg BID x6 weeks post-op (stop 6/21)  - SCDs, encouraged ambulation       DISPO:  - Code Status: Full Code (confirmed on admission)    - Access: LUE PICC  - DC home with resumed O2 and home care with IV antibiotics pending improvement in pain    - NOK: Melissa, mother, [phone]    - Pulm FUV 5/21, Onc FUV 5/27, Ortho FUV 6/9, ID FUV 6/17, Sickle Cell FUV 7/9    I spent >60 minutes in the professional and overall care of this patient    Assessment and plan as above discussed with attending physician  Dr. Deanna Couch, PA-C         [1] aspirin, 81 mg, oral, BID  calcium carbonate-vitamin D3, 1 tablet, oral, BID  DAPTOmycin (Cubicin) 435 mg in sodium chloride 0.9% 50 mL IV, 6 mg/kg, intravenous, q24h  ertapenem, 1 g, intravenous, q24h  folic acid, 1 mg, oral, Daily  lidocaine, 2 patch, transdermal, Daily  oxyCODONE ER, 30 mg, oral, q12h REYNALDO  polyethylene glycol, 17 g, oral, Daily  sennosides-docusate sodium, 2 tablet, oral, BID     [2]    [3] PRN medications: diphenhydrAMINE, HYDROmorphone, HYDROmorphone, ondansetron ODT **OR** [DISCONTINUED] ondansetron, oxygen, pseudoephedrine

## 2025-05-21 NOTE — PROGRESS NOTES
Physician HPI (5/21/2025):    24 y.o. 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, mild splenic sequestration in 2001, priapism, acute chest syndrome, and nocturnal hypoxia (baseline 2-3L at night), and recent septic arthritis s/p I&D       Immunization History:  Immunization History   Administered Date(s) Administered    Hep A / Hep B 09/12/2009    Hepatitis A vaccine, pediatric/adolescent (HAVRIX, VAQTA) 09/12/2009    Meningococcal ACWY-D (Menactra) 4-valent conjugate vaccine 09/24/2018    Meningococcal B vaccine (BEXSERO) 09/24/2018, 03/20/2019    Meningococcal MPSV4 09/05/2002    Meningococcal, Unknown Serogroups 09/05/2002    Pneumococcal conjugate vaccine, 13-valent (PREVNAR 13) 01/31/2013    Pneumococcal polysaccharide vaccine, 23-valent, age 2 years and older (PNEUMOVAX 23) 09/05/2002       Problem List:  Patient Active Problem List   Diagnosis    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)    Syncope    Scoliosis    Retinal vascular occlusion    Orchitis and epididymitis    Nocturnal hypoxia    Myopia of both eyes with astigmatism    Lymphadenopathy    Left ventricular dilation    LVH (left ventricular hypertrophy)    Left heart failure    Left atrial dilation    Hypertrophy of nasal turbinates    Heart murmur    Gait, antalgic    Cholelithiasis without obstruction    Functional asplenia    Eczema    Dizziness    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)    Chronic anemia    Chlamydial epididymitis    Congenital anomaly of foot    Cellulitis    Adenoid hypertrophy    Cardiomegaly    Localized edema    Hodgkin's paragranuloma (Multi)    Sickle cell disease with crisis and other complication    Encounter for antineoplastic chemotherapy and immunotherapy    Choledocholithiasis    Sickle cell anemia with pain    Priapism due  to sickle cell disease (Multi)    Leg wound, left, initial encounter    Sickle cell pain crisis (Multi)    Septic bursitis of elbow, right       Family History:  Family History   Problem Relation Name Age of Onset    Sickle cell trait Mother      Diabetes Mother      Sickle cell trait Father      Lung cancer Brother         Social History:  Social History     Socioeconomic History    Marital status: Single   Tobacco Use    Smoking status: Every Day     Types: Cigars     Last attempt to quit:      Years since quittin.3     Passive exposure: Past    Smokeless tobacco: Never    Tobacco comments:      1 Black and mild daily for 3 years   Substance and Sexual Activity    Alcohol use: Not Currently     Comment: rarely    Drug use: Yes     Types: Marijuana    Sexual activity: Not Currently     Social Drivers of Health     Financial Resource Strain: Low Risk  (2025)    Overall Financial Resource Strain (CARDIA)     Difficulty of Paying Living Expenses: Not very hard   Food Insecurity: No Food Insecurity (2025)    Hunger Vital Sign     Worried About Running Out of Food in the Last Year: Never true     Ran Out of Food in the Last Year: Never true   Transportation Needs: No Transportation Needs (2025)    PRAPARE - Transportation     Lack of Transportation (Medical): No     Lack of Transportation (Non-Medical): No   Physical Activity: Insufficiently Active (2024)    Exercise Vital Sign     Days of Exercise per Week: 2 days     Minutes of Exercise per Session: 30 min   Stress: Patient Declined (2024)    [employer] - Occupational Stress Questionnaire     Feeling of Stress : Patient declined   Social Connections: Feeling Socially Integrated (2025)    OASIS : Social Isolation     Frequency of experiencing loneliness or isolation: Never   Intimate Partner Violence: Not At Risk (2025)    Humiliation, Afraid, Rape, and Kick questionnaire     Fear of Current  or Ex-Partner: No     Emotionally Abused: No     Physically Abused: No     Sexually Abused: No   Housing Stability: Low Risk  (5/19/2025)    Housing Stability Vital Sign     Unable to Pay for Housing in the Last Year: No     Number of Times Moved in the Last Year: 0     Homeless in the Last Year: No       Current Medications:  Current Outpatient Medications   Medication Instructions    alteplase (CATHFLO ACTIVASE) 2 mg, intra-catheter, Once    aspirin 81 mg, oral, 2 times daily    calcium carbonate-vitamin D3 (Oscal-500) 500 mg-10 mcg (400 unit) tablet 1 tablet, oral, 2 times daily    ertapenem (INVANZ) 1 g, intravenous, Every 24 hours    folic acid (FOLVITE) 1 mg, oral, Daily    heparin flush (heparin LockFlush,Porcine,,PF,) 10 unit/mL injection 5 mL, intravenous, Daily, SASH     oxyCODONE (ROXICODONE) 20 mg, oral, Every 6 hours PRN    oxyCODONE ER (OXYCONTIN) 20 mg, oral, Every 12 hours, Do not crush, chew, or split.    oxygen (O2) gas therapy 1 each, inhalation, Nightly    pseudoephedrine (SUDOGEST) 60 mg, oral, Every 8 hours PRN    sodium chloride 0.9% (Normal Saline Flush) flush 10 mL, intravenous, Daily, per SASH    sodium chloride 0.9% parenteral solution 50 mL with DAPTOmycin 50 mg/mL recon soln 435 mg 6 mg/kg, intravenous, Every 24 hours        Drug Allergies/Intolerances:  RX Allergies[1]     Review of Systems:  Review of Systems     All other review of systems are negative and/or non-contributory.    Physical Examination:  There were no vitals taken for this visit.     General: ambulated independently; no acute distress; well-nourished; work of breathing was not increased; normal vocal character  HEENT: normocephalic; anicteric sclerae; conjunctivae not injected; nasal mucosa was unremarkable; oropharynx was clear without evidence of thrush; dentition was good.  Neck: supple; no lymphadenopathy or thyromegaly.  Chest: clear to auscultation bilaterally; no chest wall deformity.  Cardiac: regular rhythm; no  gallop or murmur.  Abdomen: soft; non-tender; non-distended; no hepatosplenomegaly.  Extremities: no leg edema; no digital clubbing; 2+ pulses  Psychiatric: did not appear depressed or anxious.    Pulmonary Function Test Results       6/2021:  422 m, lowest oxygen saturation 92%          PFT 2021       Chest Radiograph     XR chest 2 views 05/17/2025    Narrative  Interpreted By:  Tung Polanco  and Chad Cordero  STUDY:  XR CHEST 2 VIEWS;  5/17/2025 3:09 pm    INDICATION:  Signs/Symptoms:r/o pneumothorax, pneumonia, acute chest syndrome.    COMPARISON:  XR CHEST 1 VIEW 5/15/2025, CT ANGIO CHEST FOR PULMONARY EMBOLISM  5/3/2025    ACCESSION NUMBER(S):  [id number]    ORDERING CLINICIAN:  RUPINDER WORKMAN    FINDINGS:  PA and lateral radiographs of the chest were provided. Left upper  extremity PICC with terminus at the SVC.    CARDIOMEDIASTINAL SILHOUETTE:  Cardiomediastinal silhouette is normal in size and configuration.    LUNGS:  No pneumothorax, pleural effusion, or focal consolidation..    ABDOMEN:  No remarkable upper abdominal findings.    BONES:  No acute osseous changes.    Impression  1. No evidence of acute cardiopulmonary process.    I personally reviewed the images/study and I agree with the findings  as stated by Gil Bonilla MD. This study was interpreted at  [facility], [place], [place].    MACRO:  None    Signed by: Tung Polanco 5/17/2025 3:23 PM  Dictation workstation:   [id number]      Echocardiogram     5/2025:    PHYSICIAN INTERPRETATION:  Left Ventricle: Left ventricular ejection fraction is normal, calculated by Corado's biplane at 63%. There is mild eccentric left ventricular hypertrophy. There are no regional left ventricular wall motion abnormalities. The left ventricular cavity size is severely dilated. There is normal septal and normal posterior left ventricular wall thickness. There is a false tendon visualized in the left ventricle. Spectral  Doppler shows a normal pattern of left ventricular diastolic filling.  Left Atrium: The left atrial size is moderately dilated.  Right Ventricle: The right ventricle is mildly enlarged. There is normal right ventricular global systolic function.  Right Atrium: The right atrium is mildly dilated.  Aortic Valve: The aortic valve is probably trileaflet. The aortic valve dimensionless index is 0.68. There is no evidence of aortic valve regurgitation. The peak instantaneous gradient of the aortic valve is 18 mmHg. The mean gradient of the aortic valve is 9 mmHg.  Mitral Valve: The mitral valve is normal in structure. The peak instantaneous gradient of the mitral valve is 6 mmHg. There is trace mitral valve regurgitation.  Tricuspid Valve: The tricuspid valve is structurally normal. There is trace tricuspid regurgitation. The right ventricular systolic pressure is unable to be estimated.  Pulmonic Valve: The pulmonic valve is structurally normal. There is physiologic pulmonic valve regurgitation.  Pericardium: Trivial pericardial effusion.  Aorta: The aortic root is normal.  Systemic Veins: The inferior vena cava appears normal in size, with IVC inspiratory collapse greater than 50%.  In comparison to the previous echocardiogram(s): Compared with study dated 8/24/2024, no significant change Note the prior exam included an agitated saline contrast study that demonstrated delayed appearance of contrast on the left side consistent with intrapulmonary shunting. No agitated saline contrast study was done today.        CONCLUSIONS:   1. Left ventricular ejection fraction is normal, calculated by Corado's biplane at 63%.   2. Left ventricular cavity size is severely dilated.   3. There is normal right ventricular global systolic function.   4. Mildly enlarged right ventricle.   5. The left atrial size is moderately dilated.   6. Compared with study dated 8/24/2024, no significant change Note the prior exam included an agitated  saline contrast study that demonstrated delayed appearance of contrast on the left side consistent with intrapulmonary shunting. No agitated saline contrast study was done today.          Chest CT Scan         Impression  1. No evidence of acute pulmonary embolism.  2. Similar ground-glass bibasilar dependent opacities that most  likely represent atelectasis. However, acute chest syndrome is in the  differential in the appropriate clinical setting.  3. Interval resolution of the prior focal nodular opacity within the  posterior right middle lobe.    I personally reviewed the images/study and I agree with the findings  as stated by Gil Bonilla MD. This study was interpreted at  [facility], [place], [place].    MACRO:  None    Signed by: Ed Wren 5/4/2025 7:44 AM  Dictation workstation:   [id number]       Assessment and Plan / Recommendations:    PFT  Oxygen desaturation test  HRCT?  Sleep study  ABG      Follow-up:   Future Appointments   Date Time Provider Department Center   5/21/2025  1:30 PM Cj Deng MD [id number] Academic   5/22/2025 To Be Determined Cecile Chen RN [facility]   5/27/2025  1:20 PM Melissa Stoll MD [id number] Academic   5/29/2025 To Be Determined Natalie Mack RN [facility]   6/9/2025 12:00 PM Pillo Tello MD [id number] Academic   6/17/2025 10:00 AM Jb Altman MD [id number] Academic   7/9/2025 10:00 AM Homa Dickinson, APRN-CNP [id number] Academic       Cj Deng MD  05/21/2025            [1]   Allergies  Allergen Reactions    Cefepime Hallucinations    Amoxicillin Hives    Ceftriaxone Hives and Rash

## 2025-05-22 ENCOUNTER — HOME CARE VISIT (OUTPATIENT)
Dept: HOME HEALTH SERVICES | Facility: HOME HEALTH | Age: 25
End: 2025-05-22
Payer: COMMERCIAL

## 2025-05-22 LAB
ALBUMIN SERPL BCP-MCNC: 4.3 G/DL (ref 3.4–5)
ALP SERPL-CCNC: 241 U/L (ref 33–120)
ALT SERPL W P-5'-P-CCNC: 107 U/L (ref 10–52)
ANION GAP SERPL CALC-SCNC: 12 MMOL/L (ref 10–20)
AST SERPL W P-5'-P-CCNC: 131 U/L (ref 9–39)
BASOPHILS # BLD AUTO: 0.07 X10*3/UL (ref 0–0.1)
BASOPHILS NFR BLD AUTO: 0.6 %
BILIRUB SERPL-MCNC: 8 MG/DL (ref 0–1.2)
BUN SERPL-MCNC: 9 MG/DL (ref 6–23)
CALCIUM SERPL-MCNC: 10.2 MG/DL (ref 8.6–10.6)
CHLORIDE SERPL-SCNC: 99 MMOL/L (ref 98–107)
CO2 SERPL-SCNC: 29 MMOL/L (ref 21–32)
CREAT SERPL-MCNC: 0.48 MG/DL (ref 0.5–1.3)
EGFRCR SERPLBLD CKD-EPI 2021: >90 ML/MIN/1.73M*2
EOSINOPHIL # BLD AUTO: 1.25 X10*3/UL (ref 0–0.7)
EOSINOPHIL NFR BLD AUTO: 10.1 %
ERYTHROCYTE [DISTWIDTH] IN BLOOD BY AUTOMATED COUNT: 19.8 % (ref 11.5–14.5)
GLUCOSE SERPL-MCNC: 76 MG/DL (ref 74–99)
HCT VFR BLD AUTO: 24.1 % (ref 41–52)
HGB BLD-MCNC: 8.1 G/DL (ref 13.5–17.5)
HGB RETIC QN: 29 PG (ref 28–38)
IMM GRANULOCYTES # BLD AUTO: 0.09 X10*3/UL (ref 0–0.7)
IMM GRANULOCYTES NFR BLD AUTO: 0.7 % (ref 0–0.9)
IMMATURE RETIC FRACTION: 11.4 %
LDH SERPL L TO P-CCNC: 469 U/L (ref 84–246)
LYMPHOCYTES # BLD AUTO: 2.88 X10*3/UL (ref 1.2–4.8)
LYMPHOCYTES NFR BLD AUTO: 23.4 %
MCH RBC QN AUTO: 29.3 PG (ref 26–34)
MCHC RBC AUTO-ENTMCNC: 33.6 G/DL (ref 32–36)
MCV RBC AUTO: 87 FL (ref 80–100)
MONOCYTES # BLD AUTO: 1.48 X10*3/UL (ref 0.1–1)
MONOCYTES NFR BLD AUTO: 12 %
NEUTROPHILS # BLD AUTO: 6.56 X10*3/UL (ref 1.2–7.7)
NEUTROPHILS NFR BLD AUTO: 53.2 %
NRBC BLD-RTO: 1.2 /100 WBCS (ref 0–0)
PLATELET # BLD AUTO: 956 X10*3/UL (ref 150–450)
POTASSIUM SERPL-SCNC: 4.8 MMOL/L (ref 3.5–5.3)
PROT SERPL-MCNC: 7.4 G/DL (ref 6.4–8.2)
RBC # BLD AUTO: 2.76 X10*6/UL (ref 4.5–5.9)
RETICS #: 0.4 X10*6/UL (ref 0.02–0.12)
RETICS/RBC NFR AUTO: 14.3 % (ref 0.5–2)
SODIUM SERPL-SCNC: 135 MMOL/L (ref 136–145)
WBC # BLD AUTO: 12.3 X10*3/UL (ref 4.4–11.3)

## 2025-05-22 PROCEDURE — 2500000001 HC RX 250 WO HCPCS SELF ADMINISTERED DRUGS (ALT 637 FOR MEDICARE OP)

## 2025-05-22 PROCEDURE — 80053 COMPREHEN METABOLIC PANEL: CPT

## 2025-05-22 PROCEDURE — 1170000001 HC PRIVATE ONCOLOGY ROOM DAILY

## 2025-05-22 PROCEDURE — 2500000004 HC RX 250 GENERAL PHARMACY W/ HCPCS (ALT 636 FOR OP/ED): Mod: JZ,TB

## 2025-05-22 PROCEDURE — 85045 AUTOMATED RETICULOCYTE COUNT: CPT

## 2025-05-22 PROCEDURE — 85025 COMPLETE CBC W/AUTO DIFF WBC: CPT

## 2025-05-22 PROCEDURE — 99233 SBSQ HOSP IP/OBS HIGH 50: CPT

## 2025-05-22 PROCEDURE — 83615 LACTATE (LD) (LDH) ENZYME: CPT

## 2025-05-22 PROCEDURE — 2500000001 HC RX 250 WO HCPCS SELF ADMINISTERED DRUGS (ALT 637 FOR MEDICARE OP): Performed by: NURSE PRACTITIONER

## 2025-05-22 PROCEDURE — 2500000005 HC RX 250 GENERAL PHARMACY W/O HCPCS

## 2025-05-22 PROCEDURE — 2500000004 HC RX 250 GENERAL PHARMACY W/ HCPCS (ALT 636 FOR OP/ED): Mod: JZ

## 2025-05-22 RX ADMIN — HYDROMORPHONE HYDROCHLORIDE 8 MG: 4 TABLET ORAL at 21:42

## 2025-05-22 RX ADMIN — HYDROMORPHONE HYDROCHLORIDE 4 MG: 2 INJECTION, SOLUTION INTRAMUSCULAR; INTRAVENOUS; SUBCUTANEOUS at 05:51

## 2025-05-22 RX ADMIN — FOLIC ACID 1 MG: 1 TABLET ORAL at 08:44

## 2025-05-22 RX ADMIN — OXYCODONE HYDROCHLORIDE 30 MG: 20 TABLET, FILM COATED, EXTENDED RELEASE ORAL at 19:54

## 2025-05-22 RX ADMIN — HYDROMORPHONE HYDROCHLORIDE 4 MG: 2 INJECTION, SOLUTION INTRAMUSCULAR; INTRAVENOUS; SUBCUTANEOUS at 18:42

## 2025-05-22 RX ADMIN — ERTAPENEM SODIUM 1 G: 1 INJECTION INTRAMUSCULAR; INTRAVENOUS at 14:10

## 2025-05-22 RX ADMIN — HYDROMORPHONE HYDROCHLORIDE 4 MG: 2 INJECTION, SOLUTION INTRAMUSCULAR; INTRAVENOUS; SUBCUTANEOUS at 11:16

## 2025-05-22 RX ADMIN — DAPTOMYCIN 435 MG: 500 INJECTION, POWDER, LYOPHILIZED, FOR SOLUTION INTRAVENOUS at 13:27

## 2025-05-22 RX ADMIN — Medication 1 TABLET: at 08:44

## 2025-05-22 RX ADMIN — ASPIRIN 81 MG CHEWABLE TABLET 81 MG: 81 TABLET CHEWABLE at 19:55

## 2025-05-22 RX ADMIN — HYDROMORPHONE HYDROCHLORIDE 8 MG: 4 TABLET ORAL at 03:20

## 2025-05-22 RX ADMIN — LIDOCAINE 4% 2 PATCH: 40 PATCH TOPICAL at 18:14

## 2025-05-22 RX ADMIN — ASPIRIN 81 MG CHEWABLE TABLET 81 MG: 81 TABLET CHEWABLE at 08:44

## 2025-05-22 RX ADMIN — HYDROMORPHONE HYDROCHLORIDE 4 MG: 2 INJECTION, SOLUTION INTRAMUSCULAR; INTRAVENOUS; SUBCUTANEOUS at 00:26

## 2025-05-22 RX ADMIN — HYDROMORPHONE HYDROCHLORIDE 4 MG: 2 INJECTION, SOLUTION INTRAMUSCULAR; INTRAVENOUS; SUBCUTANEOUS at 16:44

## 2025-05-22 RX ADMIN — Medication 1 TABLET: at 19:54

## 2025-05-22 RX ADMIN — OXYCODONE HYDROCHLORIDE 30 MG: 20 TABLET, FILM COATED, EXTENDED RELEASE ORAL at 08:42

## 2025-05-22 RX ADMIN — HYDROMORPHONE HYDROCHLORIDE 4 MG: 2 INJECTION, SOLUTION INTRAMUSCULAR; INTRAVENOUS; SUBCUTANEOUS at 14:08

## 2025-05-22 ASSESSMENT — COGNITIVE AND FUNCTIONAL STATUS - GENERAL
DAILY ACTIVITIY SCORE: 24
DAILY ACTIVITIY SCORE: 24
MOBILITY SCORE: 24
MOBILITY SCORE: 24

## 2025-05-22 ASSESSMENT — PAIN - FUNCTIONAL ASSESSMENT
PAIN_FUNCTIONAL_ASSESSMENT: 0-10

## 2025-05-22 ASSESSMENT — PAIN SCALES - GENERAL
PAINLEVEL_OUTOF10: 9
PAINLEVEL_OUTOF10: 5 - MODERATE PAIN
PAINLEVEL_OUTOF10: 7
PAINLEVEL_OUTOF10: 10 - WORST POSSIBLE PAIN
PAINLEVEL_OUTOF10: 9
PAINLEVEL_OUTOF10: 8
PAINLEVEL_OUTOF10: 8

## 2025-05-22 ASSESSMENT — PAIN DESCRIPTION - ORIENTATION
ORIENTATION: RIGHT

## 2025-05-22 ASSESSMENT — PAIN DESCRIPTION - LOCATION
LOCATION: ARM

## 2025-05-22 ASSESSMENT — PAIN DESCRIPTION - DESCRIPTORS
DESCRIPTORS: THROBBING

## 2025-05-22 ASSESSMENT — PAIN SCALES - WONG BAKER: WONGBAKER_NUMERICALRESPONSE: HURTS LITTLE BIT

## 2025-05-22 NOTE — PROGRESS NOTES
"Joellen Narayan is a 24 y.o. male on day 5 of admission presenting with Sickle cell pain crisis (Multi).    Subjective   Joellen is doing fine this morning. Mother at bedside. Pt states his pain is uncontrolled still. We discussed that we increased his long acting pain meds last night, and that it will take a couple of doses to take its full effect. Denies chest pain, SOB, N/V/D/C, fever, chills, abdominal pain, s/sx of bleeding. ROS: A complete review of systems was performed and is negative except for as mentioned above in the HPI     Objective     Physical Exam  Vitals reviewed.   Constitutional:       Appearance: Normal appearance.   HENT:      Head: Normocephalic and atraumatic.      Nose: Nose normal.      Mouth/Throat:      Mouth: Mucous membranes are moist.      Pharynx: Oropharynx is clear.   Eyes:      General: Scleral icterus present.      Extraocular Movements: Extraocular movements intact.      Pupils: Pupils are equal, round, and reactive to light.   Cardiovascular:      Rate and Rhythm: Normal rate and regular rhythm.      Pulses: Normal pulses.      Heart sounds: Normal heart sounds.   Pulmonary:      Effort: Pulmonary effort is normal.      Breath sounds: Normal breath sounds.   Abdominal:      General: Bowel sounds are normal.      Palpations: Abdomen is soft.   Musculoskeletal:         General: Normal range of motion.   Skin:     General: Skin is warm.      Comments: +R elbow dressing c/d/I   Neurological:      General: No focal deficit present.      Mental Status: He is alert and oriented to person, place, and time. Mental status is at baseline.   Psychiatric:         Mood and Affect: Mood normal.         Behavior: Behavior normal.       Last Recorded Vitals  Blood pressure 138/76, pulse 81, temperature 36.5 °C (97.7 °F), temperature source Temporal, resp. rate 18, height 1.88 m (6' 2\"), weight 67.1 kg (147 lb 14.9 oz), SpO2 95%.  Intake/Output last 3 Shifts:  I/O last 3 completed shifts:  In: " 107 (1.6 mL/kg) [I.V.:107 (1.6 mL/kg)]  Out: 1750 (26 mL/kg) [Urine:1750 (1.1 mL/kg/hr)]  Weight: 67.4 kg     Results for orders placed or performed during the hospital encounter of 05/17/25 (from the past 24 hours)   CBC and Auto Differential   Result Value Ref Range    WBC 13.2 (H) 4.4 - 11.3 x10*3/uL    nRBC 0.8 (H) 0.0 - 0.0 /100 WBCs    RBC 2.78 (L) 4.50 - 5.90 x10*6/uL    Hemoglobin 8.1 (L) 13.5 - 17.5 g/dL    Hematocrit 24.1 (L) 41.0 - 52.0 %    MCV 87 80 - 100 fL    MCH 29.1 26.0 - 34.0 pg    MCHC 33.6 32.0 - 36.0 g/dL    RDW 19.6 (H) 11.5 - 14.5 %    Platelets 868 (H) 150 - 450 x10*3/uL    Neutrophils % 56.0 40.0 - 80.0 %    Immature Granulocytes %, Automated 0.6 0.0 - 0.9 %    Lymphocytes % 22.4 13.0 - 44.0 %    Monocytes % 11.2 2.0 - 10.0 %    Eosinophils % 9.3 0.0 - 6.0 %    Basophils % 0.5 0.0 - 2.0 %    Neutrophils Absolute 7.41 1.20 - 7.70 x10*3/uL    Immature Granulocytes Absolute, Automated 0.08 0.00 - 0.70 x10*3/uL    Lymphocytes Absolute 2.96 1.20 - 4.80 x10*3/uL    Monocytes Absolute 1.48 (H) 0.10 - 1.00 x10*3/uL    Eosinophils Absolute 1.23 (H) 0.00 - 0.70 x10*3/uL    Basophils Absolute 0.07 0.00 - 0.10 x10*3/uL   Comprehensive Metabolic Panel   Result Value Ref Range    Glucose 71 (L) 74 - 99 mg/dL    Sodium 135 (L) 136 - 145 mmol/L    Potassium 4.6 3.5 - 5.3 mmol/L    Chloride 97 (L) 98 - 107 mmol/L    Bicarbonate 31 21 - 32 mmol/L    Anion Gap 12 10 - 20 mmol/L    Urea Nitrogen 10 6 - 23 mg/dL    Creatinine 0.60 0.50 - 1.30 mg/dL    eGFR >90 >60 mL/min/1.73m*2    Calcium 10.2 8.6 - 10.6 mg/dL    Albumin 4.3 3.4 - 5.0 g/dL    Alkaline Phosphatase 233 (H) 33 - 120 U/L    Total Protein 7.5 6.4 - 8.2 g/dL     (H) 9 - 39 U/L    Bilirubin, Total 8.5 (H) 0.0 - 1.2 mg/dL    ALT 91 (H) 10 - 52 U/L   Lactate Dehydrogenase   Result Value Ref Range     (H) 84 - 246 U/L   Reticulocytes   Result Value Ref Range    Retic % 14.7 (H) 0.5 - 2.0 %    Retic Absolute 0.409 (H) 0.022 - 0.118 x10*6/uL     Reticulocyte Hemoglobin 31 28 - 38 pg    Immature Retic fraction 7.4 <=16.0 %   POCT GLUCOSE   Result Value Ref Range    POCT Glucose 91 74 - 99 mg/dL     *Note: Due to a large number of results and/or encounters for the requested time period, some results have not been displayed. A complete set of results can be found in Results Review.       Scheduled medications  Scheduled Medications[1]  Continuous medications  Continuous Medications[2]  PRN medications  PRN Medications[3]      Assessment & Plan  Sickle cell pain crisis (Multi)    Joellen Narayan is a 24 y.o. 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 Hgb and lysis labs stable at baseline. Pt admitted for further management of pain. Started on IV dilaudid per care plan. ID consulted for atb ordering, rec Xray R elbow and ortho consult. Xray R elbow (5/18) showing postsurgical changes of the elbow with large joint effusion and soft tissue edema. Ortho consulted 5/18, rec no acute ortho intervention. Added oral dilaudid into pain regimen 5/19 in attempt to wean IV dilaudid. DC home with resumed O2 and home care with IV antibiotics pending improvement in pain.     Updates 5/22:  - Started Oxycontin, increased to 30mg BID (5/20), PA approved 5/21  - Added oral dilaudid into pain regimen 5/19 in attempt to wean IV dilaudid  - IV dilaudid weaned to 4mg q2h PRN, will continue this pain reg for today and try to  focus on PO pain meds only with IV for breakthru tomorrow 5/23     # Septic Arthritis    - Recently admitted and discharged from hospital on 5/16 and treated for SA  - Ortho consulted 5/8, s/p aspirate (>44k cells)   - MRI w/anesthesia (5/9) R radius/humerus showing concern for septic arthritis, osteomyelitis, myositis, severe muscle and subcutaneous soft tissue edema, and possible cellulitis   - s/p R elbow I&D 5/10 with ortho    - Tissue/ wound cultures NGTD, Fungal Cultures NGTD 5/12  - Final Ortho recs from 5/12; WBAT RUE, Mepilex remove POD7 (5/17/25), drain removed 5/12, require 6 weeks total of DVT prophylaxis from an orthopedic standpoint, Calcium/Vitamin D 500mg-400IU BID for 6 weeks (continued on admit), follow up w/ Dr. Tello 2 weeks after surgery for post-operative appointment (scheduled 6/9)   - Final ID recs for long term antibiotics on 5/13; IV daptomycin 6mg/kg q24h and ertapenem 1g q24h until 6/20/25--> continued on admission  - ID consulted 5/18 for atb ordering, rec Xray R elbow and ortho consult, weekly CK, signed off 5/19  - Xray R elbow (5/18) showing postsurgical changes of the elbow with large joint effusion and soft tissue edema  - Ortho consulted 5/18, rec no acute ortho intervention  - CRP 2.96, ESR 8, CK 49 (5/18) --> CRP 1.6 (5/20)     # Hgb SS disease    # Post-surgical pain  - OARRS reviewed on admission, no aberrancies noted    - Hgb BL ~8; Hgb 7.2 (5/18)--> 7.6 (5/19)  - LDH BL ~500;  (5/18)--> 451 (5/19)  - Tbili BL ~8; Tbli 8.3 (5/18)--> 7.9 (5/19)  - Pain is likely post-surgical + sickle cell pain; when pt was dc on 5/16 his MS contin wasn't approved with PA and he didn't want to remain admitted (also was discharged right off IV dilaudid)--> so current pain is likely withdrawal-related  - s/p IV dilaudid 4mg q2hrs PRN severe pain (5/17)  - Increased IV dilaudid to 6mg q2hrs PRN severe pain (5/18-5/20)  - decreased IV dilaudid to 4mg q2h PRN for severe pain  (5/20-current)  - started Oxycontin 20mg BID (5/19); PA approved, increased to 30mg BID 5/20 (PA approved 5/21)  - Added PO dilaudid 8mg q4hrs PRN breakthrough pain- ordered specifically so that pt gets 2 does of IV dilaudid followed by oral dilaudid in attempt to wean IV dilaudid while Oxycontin builds up  - Plan to focus on PO pain meds only 5/23 with IV pain meds for breakthru  - Lidocaine patches x2  - Continue folic acid 1mg daily  - Utox (5/17): pending collection  - CXR (5/17): negative for acute process  - Bowel regimen for opioid-induced constipation with DocuSenna 2tabs BID and Miralax daily     # Hx of nocturnal oxygen dependence   - On 2-3L at night, however patient reports daytime hypoxia as well in the past, currently on 2L 5/17   - CXR (5/17): negative for acute process     # Hx choledocholithiasis 7/2024 -improved    - Worsening hemolysis in setting of active infection could be contributing to increased hyperbilirubinemia    - July 2024 s/p ERCP with biliary sphincterotomy where sludge and stones were removed, achieving complete clearance    - 1/31/25 was planned for cholecystectomy with Dr. Dove but no show on day of surgery and again 2/13 and 2/27    - Tbili stable     # Hx Priapism   - No active issues on admit    - Hx previously drained by urology    - Continue home Sudafed 30mg daily PRN priapism     # Nodular lymphocyte predominant Hodgkins lymphoma (NLPHL)     - Follows with Dr. Stoll   - s/p Rituxan and prednisone q3 weeks (C1 1/18/24, C2 2/8/24, Missed C3 d/t sickle cell pain crisis, C4 4/4/24, C5 5/16/24, C6 6/7/24)    - 3/13 No show to onc appt    - 4/9 PET showed interval development of new FDG focus in mid abdomen   - Outpatient appointment scheduled 5/27       # Prophy   - ASA 81mg BID x6 weeks post-op (stop 6/21)  - SCDs, encouraged ambulation       DISPO:  - Code Status: Full Code (confirmed on admission)    - Access: LUE PICC  - DC home with resumed O2 and home care with IV  antibiotics pending improvement in pain    - NOK: mother Torres, [phone]    - Pulm FUV 5/21 (new referral in), Onc FUV 5/27, Ortho FUV 6/9, ID FUV 6/17, Sickle Cell FUV 7/9    I spent >60 minutes in the professional and overall care of this patient    Assessment and plan as above discussed with attending physician Dr. Deanna Couch, PA-C         [1] aspirin, 81 mg, oral, BID  calcium carbonate-vitamin D3, 1 tablet, oral, BID  DAPTOmycin (Cubicin) 435 mg in sodium chloride 0.9% 50 mL IV, 6 mg/kg, intravenous, q24h  ertapenem, 1 g, intravenous, q24h  folic acid, 1 mg, oral, Daily  lidocaine, 2 patch, transdermal, Daily  oxyCODONE ER, 30 mg, oral, q12h REYNALDO  polyethylene glycol, 17 g, oral, Daily  sennosides-docusate sodium, 2 tablet, oral, BID     [2]    [3] PRN medications: diphenhydrAMINE, HYDROmorphone, HYDROmorphone, ondansetron ODT **OR** [DISCONTINUED] ondansetron, oxygen, pseudoephedrine

## 2025-05-22 NOTE — PROGRESS NOTES
05/22/25 1523   Reason for Consult   Discipline Child Life Specialist   Reason for Consult Coping skill development/planning   Total Time Spent (min) 10 minutes   Patient Intervention(s)   Type of Intervention Performed Healing environment interventions   Healing Environment Intervention(s) Empathetic listening/validation of emotions     Certified Child Life Specialist (CCLS) familiar with patient from previous interactions.  Provided support for coping with current admission and an adult coloring book for an activity at bedside.  Patient denied additional needs at this time.  Child life available as needed throughout patient's admission.    FINESSE Stephens, CCLS  [place]  Ext. [phone]  Family and Child Life Services

## 2025-05-22 NOTE — CARE PLAN
Problem: Pain - Adult  Goal: Verbalizes/displays adequate comfort level or baseline comfort level  Outcome: Not Progressing     Problem: Pain  Goal: Takes deep breaths with improved pain control throughout the shift  Outcome: Not Progressing  Goal: Turns in bed with improved pain control throughout the shift  Outcome: Not Progressing  Goal: Walks with improved pain control throughout the shift  Outcome: Not Progressing  Goal: Performs ADL's with improved pain control throughout shift  Outcome: Not Progressing     Problem: Safety - Adult  Goal: Free from fall injury  Outcome: Progressing     Problem: Discharge Planning  Goal: Discharge to home or other facility with appropriate resources  Outcome: Progressing     Problem: Chronic Conditions and Co-morbidities  Goal: Patient's chronic conditions and co-morbidity symptoms are monitored and maintained or improved  Outcome: Progressing     Problem: Nutrition  Goal: Nutrient intake appropriate for maintaining nutritional needs  Outcome: Progressing     Problem: Pain  Goal: Participates in PT with improved pain control throughout the shift  Outcome: Progressing  Goal: Free from opioid side effects throughout the shift  Outcome: Progressing  Goal: Free from acute confusion related to pain meds throughout the shift  Outcome: Progressing     Problem: Pain - Adult  Goal: Verbalizes/displays adequate comfort level or baseline comfort level  Outcome: Not Progressing     Problem: Pain  Goal: Takes deep breaths with improved pain control throughout the shift  Outcome: Not Progressing  Goal: Turns in bed with improved pain control throughout the shift  Outcome: Not Progressing  Goal: Walks with improved pain control throughout the shift  Outcome: Not Progressing  Goal: Performs ADL's with improved pain control throughout shift  Outcome: Not Progressing

## 2025-05-23 DIAGNOSIS — M86.9 OSTEOMYELITIS, UNSPECIFIED SITE, UNSPECIFIED TYPE (MULTI): ICD-10-CM

## 2025-05-23 LAB
ALBUMIN SERPL BCP-MCNC: 4.2 G/DL (ref 3.4–5)
ALP SERPL-CCNC: 214 U/L (ref 33–120)
ALT SERPL W P-5'-P-CCNC: 94 U/L (ref 10–52)
ANION GAP SERPL CALC-SCNC: 14 MMOL/L (ref 10–20)
AST SERPL W P-5'-P-CCNC: 114 U/L (ref 9–39)
BASOPHILS # BLD AUTO: 0.08 X10*3/UL (ref 0–0.1)
BASOPHILS NFR BLD AUTO: 0.6 %
BILIRUB SERPL-MCNC: 7.4 MG/DL (ref 0–1.2)
BUN SERPL-MCNC: 9 MG/DL (ref 6–23)
CALCIUM SERPL-MCNC: 9.8 MG/DL (ref 8.6–10.6)
CHLORIDE SERPL-SCNC: 99 MMOL/L (ref 98–107)
CO2 SERPL-SCNC: 29 MMOL/L (ref 21–32)
CREAT SERPL-MCNC: 0.56 MG/DL (ref 0.5–1.3)
EGFRCR SERPLBLD CKD-EPI 2021: >90 ML/MIN/1.73M*2
EOSINOPHIL # BLD AUTO: 1.1 X10*3/UL (ref 0–0.7)
EOSINOPHIL NFR BLD AUTO: 8.8 %
ERYTHROCYTE [DISTWIDTH] IN BLOOD BY AUTOMATED COUNT: 19.9 % (ref 11.5–14.5)
GLUCOSE SERPL-MCNC: 79 MG/DL (ref 74–99)
HCT VFR BLD AUTO: 22.3 % (ref 41–52)
HGB BLD-MCNC: 7.8 G/DL (ref 13.5–17.5)
HGB RETIC QN: 29 PG (ref 28–38)
IMM GRANULOCYTES # BLD AUTO: 0.11 X10*3/UL (ref 0–0.7)
IMM GRANULOCYTES NFR BLD AUTO: 0.9 % (ref 0–0.9)
IMMATURE RETIC FRACTION: 18.5 %
LDH SERPL L TO P-CCNC: 482 U/L (ref 84–246)
LYMPHOCYTES # BLD AUTO: 3.12 X10*3/UL (ref 1.2–4.8)
LYMPHOCYTES NFR BLD AUTO: 24.8 %
MCH RBC QN AUTO: 30.6 PG (ref 26–34)
MCHC RBC AUTO-ENTMCNC: 35 G/DL (ref 32–36)
MCV RBC AUTO: 88 FL (ref 80–100)
MONOCYTES # BLD AUTO: 1.25 X10*3/UL (ref 0.1–1)
MONOCYTES NFR BLD AUTO: 10 %
NEUTROPHILS # BLD AUTO: 6.9 X10*3/UL (ref 1.2–7.7)
NEUTROPHILS NFR BLD AUTO: 54.9 %
NRBC BLD-RTO: 1.4 /100 WBCS (ref 0–0)
PLATELET # BLD AUTO: 559 X10*3/UL (ref 150–450)
POTASSIUM SERPL-SCNC: 4.7 MMOL/L (ref 3.5–5.3)
PROT SERPL-MCNC: 7 G/DL (ref 6.4–8.2)
RBC # BLD AUTO: 2.55 X10*6/UL (ref 4.5–5.9)
RETICS #: 0.39 X10*6/UL (ref 0.02–0.12)
RETICS/RBC NFR AUTO: 15.1 % (ref 0.5–2)
SODIUM SERPL-SCNC: 137 MMOL/L (ref 136–145)
WBC # BLD AUTO: 12.6 X10*3/UL (ref 4.4–11.3)

## 2025-05-23 PROCEDURE — 2500000004 HC RX 250 GENERAL PHARMACY W/ HCPCS (ALT 636 FOR OP/ED): Mod: JZ,TB

## 2025-05-23 PROCEDURE — 84075 ASSAY ALKALINE PHOSPHATASE: CPT

## 2025-05-23 PROCEDURE — 2500000005 HC RX 250 GENERAL PHARMACY W/O HCPCS

## 2025-05-23 PROCEDURE — 99233 SBSQ HOSP IP/OBS HIGH 50: CPT

## 2025-05-23 PROCEDURE — 1170000001 HC PRIVATE ONCOLOGY ROOM DAILY

## 2025-05-23 PROCEDURE — 85045 AUTOMATED RETICULOCYTE COUNT: CPT

## 2025-05-23 PROCEDURE — 97161 PT EVAL LOW COMPLEX 20 MIN: CPT | Mod: GP

## 2025-05-23 PROCEDURE — 2500000001 HC RX 250 WO HCPCS SELF ADMINISTERED DRUGS (ALT 637 FOR MEDICARE OP)

## 2025-05-23 PROCEDURE — 2500000001 HC RX 250 WO HCPCS SELF ADMINISTERED DRUGS (ALT 637 FOR MEDICARE OP): Performed by: NURSE PRACTITIONER

## 2025-05-23 PROCEDURE — 85025 COMPLETE CBC W/AUTO DIFF WBC: CPT

## 2025-05-23 PROCEDURE — 83615 LACTATE (LD) (LDH) ENZYME: CPT

## 2025-05-23 PROCEDURE — 2500000004 HC RX 250 GENERAL PHARMACY W/ HCPCS (ALT 636 FOR OP/ED): Mod: JW

## 2025-05-23 RX ADMIN — SENNOSIDES AND DOCUSATE SODIUM 2 TABLET: 50; 8.6 TABLET ORAL at 08:55

## 2025-05-23 RX ADMIN — LIDOCAINE 4% 2 PATCH: 40 PATCH TOPICAL at 18:12

## 2025-05-23 RX ADMIN — ERTAPENEM SODIUM 1 G: 1 INJECTION INTRAMUSCULAR; INTRAVENOUS at 15:19

## 2025-05-23 RX ADMIN — ASPIRIN 81 MG CHEWABLE TABLET 81 MG: 81 TABLET CHEWABLE at 08:55

## 2025-05-23 RX ADMIN — HYDROMORPHONE HYDROCHLORIDE 4 MG: 2 INJECTION, SOLUTION INTRAMUSCULAR; INTRAVENOUS; SUBCUTANEOUS at 23:56

## 2025-05-23 RX ADMIN — HYDROMORPHONE HYDROCHLORIDE 4 MG: 2 INJECTION, SOLUTION INTRAMUSCULAR; INTRAVENOUS; SUBCUTANEOUS at 10:48

## 2025-05-23 RX ADMIN — HYDROMORPHONE HYDROCHLORIDE 4 MG: 2 INJECTION, SOLUTION INTRAMUSCULAR; INTRAVENOUS; SUBCUTANEOUS at 21:53

## 2025-05-23 RX ADMIN — DAPTOMYCIN 435 MG: 500 INJECTION, POWDER, LYOPHILIZED, FOR SOLUTION INTRAVENOUS at 14:25

## 2025-05-23 RX ADMIN — HYDROMORPHONE HYDROCHLORIDE 8 MG: 4 TABLET ORAL at 15:17

## 2025-05-23 RX ADMIN — HYDROMORPHONE HYDROCHLORIDE 4 MG: 2 INJECTION, SOLUTION INTRAMUSCULAR; INTRAVENOUS; SUBCUTANEOUS at 02:24

## 2025-05-23 RX ADMIN — POLYETHYLENE GLYCOL 3350 17 G: 17 POWDER, FOR SOLUTION ORAL at 08:55

## 2025-05-23 RX ADMIN — ASPIRIN 81 MG CHEWABLE TABLET 81 MG: 81 TABLET CHEWABLE at 20:47

## 2025-05-23 RX ADMIN — HYDROMORPHONE HYDROCHLORIDE 4 MG: 2 INJECTION, SOLUTION INTRAMUSCULAR; INTRAVENOUS; SUBCUTANEOUS at 00:00

## 2025-05-23 RX ADMIN — OXYCODONE HYDROCHLORIDE 30 MG: 20 TABLET, FILM COATED, EXTENDED RELEASE ORAL at 08:55

## 2025-05-23 RX ADMIN — Medication 1 TABLET: at 08:55

## 2025-05-23 RX ADMIN — HYDROMORPHONE HYDROCHLORIDE 4 MG: 2 INJECTION, SOLUTION INTRAMUSCULAR; INTRAVENOUS; SUBCUTANEOUS at 18:11

## 2025-05-23 RX ADMIN — FOLIC ACID 1 MG: 1 TABLET ORAL at 08:55

## 2025-05-23 RX ADMIN — HYDROMORPHONE HYDROCHLORIDE 4 MG: 2 INJECTION, SOLUTION INTRAMUSCULAR; INTRAVENOUS; SUBCUTANEOUS at 06:44

## 2025-05-23 RX ADMIN — HYDROMORPHONE HYDROCHLORIDE 8 MG: 4 TABLET ORAL at 04:43

## 2025-05-23 RX ADMIN — DIPHENHYDRAMINE HYDROCHLORIDE 25 MG: 25 CAPSULE ORAL at 02:29

## 2025-05-23 RX ADMIN — HYDROMORPHONE HYDROCHLORIDE 4 MG: 2 INJECTION, SOLUTION INTRAMUSCULAR; INTRAVENOUS; SUBCUTANEOUS at 13:17

## 2025-05-23 RX ADMIN — SENNOSIDES AND DOCUSATE SODIUM 2 TABLET: 50; 8.6 TABLET ORAL at 20:47

## 2025-05-23 RX ADMIN — OXYCODONE HYDROCHLORIDE 30 MG: 20 TABLET, FILM COATED, EXTENDED RELEASE ORAL at 20:47

## 2025-05-23 RX ADMIN — Medication 1 TABLET: at 20:47

## 2025-05-23 ASSESSMENT — COGNITIVE AND FUNCTIONAL STATUS - GENERAL
MOBILITY SCORE: 24
DAILY ACTIVITIY SCORE: 24
MOBILITY SCORE: 24

## 2025-05-23 ASSESSMENT — PAIN - FUNCTIONAL ASSESSMENT
PAIN_FUNCTIONAL_ASSESSMENT: 0-10

## 2025-05-23 ASSESSMENT — PAIN SCALES - GENERAL
PAINLEVEL_OUTOF10: 10 - WORST POSSIBLE PAIN
PAINLEVEL_OUTOF10: 8
PAINLEVEL_OUTOF10: 9
PAINLEVEL_OUTOF10: 5 - MODERATE PAIN

## 2025-05-23 ASSESSMENT — ACTIVITIES OF DAILY LIVING (ADL): ADL_ASSISTANCE: INDEPENDENT

## 2025-05-23 ASSESSMENT — PAIN DESCRIPTION - DESCRIPTORS: DESCRIPTORS: THROBBING

## 2025-05-23 NOTE — CARE PLAN
Problem: Pain - Adult  Goal: Verbalizes/displays adequate comfort level or baseline comfort level  Outcome: Not Progressing     Problem: Pain  Goal: Takes deep breaths with improved pain control throughout the shift  Outcome: Not Progressing  Goal: Turns in bed with improved pain control throughout the shift  Outcome: Not Progressing  Goal: Walks with improved pain control throughout the shift  Outcome: Not Progressing  Goal: Performs ADL's with improved pain control throughout shift  Outcome: Not Progressing  Goal: Participates in PT with improved pain control throughout the shift  Outcome: Not Progressing     Problem: Safety - Adult  Goal: Free from fall injury  Outcome: Progressing     Problem: Discharge Planning  Goal: Discharge to home or other facility with appropriate resources  Outcome: Progressing     Problem: Chronic Conditions and Co-morbidities  Goal: Patient's chronic conditions and co-morbidity symptoms are monitored and maintained or improved  Outcome: Progressing     Problem: Nutrition  Goal: Nutrient intake appropriate for maintaining nutritional needs  Outcome: Progressing     Problem: Pain  Goal: Free from opioid side effects throughout the shift  Outcome: Progressing  Goal: Free from acute confusion related to pain meds throughout the shift  Outcome: Progressing    Problem: Pain - Adult  Goal: Verbalizes/displays adequate comfort level or baseline comfort level  Outcome: Not Progressing     Problem: Pain  Goal: Takes deep breaths with improved pain control throughout the shift  Outcome: Not Progressing  Goal: Turns in bed with improved pain control throughout the shift  Outcome: Not Progressing  Goal: Walks with improved pain control throughout the shift  Outcome: Not Progressing  Goal: Performs ADL's with improved pain control throughout shift  Outcome: Not Progressing  Goal: Participates in PT with improved pain control throughout the shift  Outcome: Not Progressing

## 2025-05-23 NOTE — PROGRESS NOTES
"Joellen Narayan is a 24 y.o. male on day 6 of admission presenting with Sickle cell pain crisis (Multi).    Subjective   Seen this pt this AM at bedside. Pt still c/o severe pain in R arm, states that it is somewhat improved since yesterday. Also states that he thinks the long acting Oxycontin is helping. Denies issues with voiding and constipation, last BM 5/22. Discussed plans to continue with same pain regimen today. Pt remains on RA. Denies fever/chills, N/V/C/D, bleeding, bruising, SOB, WEBSTER, CP, swelling, headache and vision changes.     Objective     Physical Exam  Vitals reviewed.   Constitutional:       Appearance: Normal appearance.   HENT:      Head: Normocephalic and atraumatic.      Nose: Nose normal.      Mouth/Throat:      Mouth: Mucous membranes are moist.      Pharynx: Oropharynx is clear.   Eyes:      General: Scleral icterus present.      Extraocular Movements: Extraocular movements intact.      Pupils: Pupils are equal, round, and reactive to light.   Cardiovascular:      Rate and Rhythm: Normal rate and regular rhythm.      Pulses: Normal pulses.      Heart sounds: Normal heart sounds.   Pulmonary:      Effort: Pulmonary effort is normal.      Breath sounds: Normal breath sounds.   Abdominal:      General: Bowel sounds are normal.      Palpations: Abdomen is soft.   Musculoskeletal:         General: Normal range of motion.   Skin:     General: Skin is warm.      Comments: +R elbow dressing c/d/I   Neurological:      General: No focal deficit present.      Mental Status: He is alert and oriented to person, place, and time. Mental status is at baseline.   Psychiatric:         Mood and Affect: Mood normal.         Behavior: Behavior normal.       Last Recorded Vitals  Blood pressure 141/65, pulse 75, temperature 37 °C (98.6 °F), temperature source Temporal, resp. rate 16, height 1.88 m (6' 2\"), weight 66.6 kg (146 lb 13.2 oz), SpO2 92%.  Intake/Output last 3 Shifts:  I/O last 3 completed " shifts:  In: 400 (6 mL/kg) [P.O.:400]  Out: 1750 (26.1 mL/kg) [Urine:1750 (0.7 mL/kg/hr)]  Weight: 67.1 kg     Results for orders placed or performed during the hospital encounter of 05/17/25 (from the past 24 hours)   CBC and Auto Differential   Result Value Ref Range    WBC 12.6 (H) 4.4 - 11.3 x10*3/uL    nRBC 1.4 (H) 0.0 - 0.0 /100 WBCs    RBC 2.55 (L) 4.50 - 5.90 x10*6/uL    Hemoglobin 7.8 (L) 13.5 - 17.5 g/dL    Hematocrit 22.3 (L) 41.0 - 52.0 %    MCV 88 80 - 100 fL    MCH 30.6 26.0 - 34.0 pg    MCHC 35.0 32.0 - 36.0 g/dL    RDW 19.9 (H) 11.5 - 14.5 %    Platelets 559 (H) 150 - 450 x10*3/uL    Neutrophils % 54.9 40.0 - 80.0 %    Immature Granulocytes %, Automated 0.9 0.0 - 0.9 %    Lymphocytes % 24.8 13.0 - 44.0 %    Monocytes % 10.0 2.0 - 10.0 %    Eosinophils % 8.8 0.0 - 6.0 %    Basophils % 0.6 0.0 - 2.0 %    Neutrophils Absolute 6.90 1.20 - 7.70 x10*3/uL    Immature Granulocytes Absolute, Automated 0.11 0.00 - 0.70 x10*3/uL    Lymphocytes Absolute 3.12 1.20 - 4.80 x10*3/uL    Monocytes Absolute 1.25 (H) 0.10 - 1.00 x10*3/uL    Eosinophils Absolute 1.10 (H) 0.00 - 0.70 x10*3/uL    Basophils Absolute 0.08 0.00 - 0.10 x10*3/uL   Comprehensive Metabolic Panel   Result Value Ref Range    Glucose 79 74 - 99 mg/dL    Sodium 137 136 - 145 mmol/L    Potassium 4.7 3.5 - 5.3 mmol/L    Chloride 99 98 - 107 mmol/L    Bicarbonate 29 21 - 32 mmol/L    Anion Gap 14 10 - 20 mmol/L    Urea Nitrogen 9 6 - 23 mg/dL    Creatinine 0.56 0.50 - 1.30 mg/dL    eGFR >90 >60 mL/min/1.73m*2    Calcium 9.8 8.6 - 10.6 mg/dL    Albumin 4.2 3.4 - 5.0 g/dL    Alkaline Phosphatase 214 (H) 33 - 120 U/L    Total Protein 7.0 6.4 - 8.2 g/dL     (H) 9 - 39 U/L    Bilirubin, Total 7.4 (H) 0.0 - 1.2 mg/dL    ALT 94 (H) 10 - 52 U/L   Lactate Dehydrogenase   Result Value Ref Range     (H) 84 - 246 U/L   Reticulocytes   Result Value Ref Range    Retic % 15.1 (H) 0.5 - 2.0 %    Retic Absolute 0.385 (H) 0.022 - 0.118 x10*6/uL     Reticulocyte Hemoglobin 29 28 - 38 pg    Immature Retic fraction 18.5 (H) <=16.0 %     *Note: Due to a large number of results and/or encounters for the requested time period, some results have not been displayed. A complete set of results can be found in Results Review.       Scheduled medications  Scheduled Medications[1]  Continuous medications  Continuous Medications[2]  PRN medications  PRN Medications[3]      Assessment & Plan  Sickle cell pain crisis (Multi)    Joellen Narayan is a 24 y.o. 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 Hgb and lysis labs stable at baseline. Pt admitted for further management of pain. Started on IV dilaudid per care plan. ID consulted for atb ordering, rec Xray R elbow and ortho consult. Xray R elbow (5/18) showing postsurgical changes of the elbow with large joint effusion and soft tissue edema. Ortho consulted 5/18, rec no acute ortho intervention. Added oral dilaudid into pain regimen 5/19 in attempt to wean IV dilaudid. DC home with resumed O2 and home care with IV antibiotics pending improvement in pain.     Updates 5/23:  - Started Oxycontin, increased to 30mg BID (5/20), PA approved 5/21  - Continue with pain regimen as is today per pt request, plan to begin to wean IV Dilaudid tomorrow, 5/24; pt agreeable     # Septic Arthritis    - Recently admitted and discharged from hospital on 5/16 and treated for SA  - Ortho  consulted 5/8, s/p aspirate (>44k cells)   - MRI w/anesthesia (5/9) R radius/humerus showing concern for septic arthritis, osteomyelitis, myositis, severe muscle and subcutaneous soft tissue edema, and possible cellulitis   - s/p R elbow I&D 5/10 with ortho    - Tissue/ wound cultures NGTD, Fungal Cultures NGTD 5/12  - Final Ortho recs from 5/12; WBAT RUE, Mepilex remove POD7 (5/17/25), drain removed 5/12, require 6 weeks total of DVT prophylaxis from an orthopedic standpoint, Calcium/Vitamin D 500mg-400IU BID for 6 weeks (continued on admit), follow up w/ Dr. Tello 2 weeks after surgery for post-operative appointment (scheduled 6/9)   - Final ID recs for long term antibiotics on 5/13; IV daptomycin 6mg/kg q24h and ertapenem 1g q24h until 6/20/25--> continued on admission  - ID consulted 5/18 for atb ordering, rec Xray R elbow and ortho consult, weekly CK, signed off 5/19  - Xray R elbow (5/18) showing postsurgical changes of the elbow with large joint effusion and soft tissue edema  - Ortho consulted 5/18, rec no acute ortho intervention  - CRP 2.96, ESR 8, CK 49 (5/18) --> CRP 1.6 (5/20)     # Hgb SS disease    # Post-surgical pain  - OARRS reviewed on admission, no aberrancies noted    - Hgb BL ~8; Hgb 7.2 (5/18)--> 7.6 (5/19)  - LDH BL ~500;  (5/18)--> 451 (5/19)  - Tbili BL ~8; Tbli 8.3 (5/18)--> 7.9 (5/19)  - Pain is likely post-surgical + sickle cell pain; when pt was dc on 5/16 his MS contin wasn't approved with PA and he didn't want to remain admitted (also was discharged right off IV dilaudid)--> so current pain is likely withdrawal-related  - s/p IV dilaudid 4mg q2hrs PRN severe pain (5/17)  - Increased IV dilaudid to 6mg q2hrs PRN severe pain (5/18-5/20)  - decreased IV dilaudid to 4mg q2h PRN for severe pain (5/20-current)  - started Oxycontin 20mg BID (5/19); PA approved, increased to 30mg BID 5/20 (PA approved 5/21)  - Added PO dilaudid 8mg q4hrs PRN breakthrough pain- ordered specifically so  that pt gets 2 doses of IV dilaudid followed by oral dilaudid in attempt to wean IV dilaudid while Oxycontin builds up  - Plan to focus on PO pain meds only 5/24 with IV pain meds for breakthru  - Lidocaine patches x2  - Continue folic acid 1mg daily  - Utox (5/17): pending collection  - CXR (5/17): negative for acute process  - Bowel regimen for opioid-induced constipation with DocuSenna 2tabs BID and Miralax daily     # Hx of nocturnal oxygen dependence   - On 2-3L at night, however patient reports daytime hypoxia as well in the past, currently on RA  - CXR (5/17): negative for acute process     # Hx choledocholithiasis 7/2024 -improved    - Worsening hemolysis in setting of active infection could be contributing to increased hyperbilirubinemia    - July 2024 s/p ERCP with biliary sphincterotomy where sludge and stones were removed, achieving complete clearance    - 1/31/25 was planned for cholecystectomy with Dr. Dove but no show on day of surgery and again 2/13 and 2/27    - Tbili stable     # Hx Priapism   - No active issues on admit    - Hx previously drained by urology    - Continue home Sudafed 30mg daily PRN priapism     # Nodular lymphocyte predominant Hodgkins lymphoma (NLPHL)     - Follows with Dr. Stoll   - s/p Rituxan and prednisone q3 weeks (C1 1/18/24, C2 2/8/24, Missed C3 d/t sickle cell pain crisis, C4 4/4/24, C5 5/16/24, C6 6/7/24)    - 3/13 No show to onc appt    - 4/9 PET showed interval development of new FDG focus in mid abdomen   - Outpatient appointment scheduled 5/27       # Prophy   - ASA 81mg BID x6 weeks post-op (stop 6/21)  - SCDs, encouraged ambulation       DISPO:  - Code Status: Full Code (confirmed on admission)    - Access: LUE PICC  - DC home with resumed O2 and home care with IV antibiotics pending improvement in pain    - NOK: Melissa, mother, [phone]    - Pulm FUV 5/21 (new referral in), Onc FUV 5/27, Ortho FUV 6/9, ID FUV 6/17, Sickle Cell FUV 7/9    I spent 60 minutes  in the professional and overall care of this patient    Assessment and plan as above discussed with attending physician Dr. Parnell.    Brenda Ocasio, APRN-CNP       [1] aspirin, 81 mg, oral, BID  calcium carbonate-vitamin D3, 1 tablet, oral, BID  DAPTOmycin (Cubicin) 435 mg in sodium chloride 0.9% 50 mL IV, 6 mg/kg, intravenous, q24h  ertapenem, 1 g, intravenous, q24h  folic acid, 1 mg, oral, Daily  lidocaine, 2 patch, transdermal, Daily  oxyCODONE ER, 30 mg, oral, q12h REYNALDO  polyethylene glycol, 17 g, oral, Daily  sennosides-docusate sodium, 2 tablet, oral, BID     [2]    [3] PRN medications: diphenhydrAMINE, HYDROmorphone, HYDROmorphone, ondansetron ODT **OR** [DISCONTINUED] ondansetron, oxygen, pseudoephedrine

## 2025-05-23 NOTE — PROGRESS NOTES
Physical Therapy    Physical Therapy Evaluation    Patient Name: Joellne Narayan  MRN: [id number]  Department: [facility]  Room: [address] [place] [address]  Today's Date: 5/23/2025   Time Calculation  Start Time: 1212  Stop Time: 1221  Time Calculation (min): 9 min    Assessment/Plan   PT Assessment  PT Assessment Results: Decreased range of motion, Pain  Rehab Prognosis: Good  Barriers to Discharge Home: No anticipated barriers  Evaluation/Treatment Tolerance: Patient tolerated treatment well  Medical Staff Made Aware: Yes  Strengths: Attitude of self  Barriers to Participation: Comorbidities  End of Session Communication: Bedside nurse  Assessment Comment: pt presenting for Sickle cell pain crisis. pt had I&D of R elbow on 5/10. still limited in ROM 2/2 pain. pt benefits from OUTPATIENT PT/OT for RUE  End of Session Patient Position: Bed, 3 rail up, Alarm off, not on at start of session  IP OR SWING BED PT PLAN  Inpatient or Swing Bed: Inpatient  PT Plan  Treatment/Interventions: Bed mobility, Transfer training, Gait training, Stair training, Balance training, Strengthening, Therapeutic exercise, Range of motion, Therapeutic activity, Home exercise program  PT Plan: Ongoing PT  PT Frequency: 1 time per week  PT Discharge Recommendations: Low intensity level of continued care (OUTPATIENT PT/OT for RUE)  Equipment Recommended upon Discharge:  (none)  PT Recommended Transfer Status: Independent  PT - OK to Discharge: Yes    Subjective     PT Visit Info:  PT Received On: 05/23/25  General Visit Information:  General  Reason for Referral: Sickle cell pain crisis;  R elbow I&D 5/10 with ortho  Past Medical History Relevant to Rehab: nodular lymphocyte predominant Hodgkins lymphoma (NLPHL) (s/p rituxan/prednisone, not on current active chemo), 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  Home Living:  Home Living  Type of Home: House  Lives With: Parent(s) (mom)  Home Adaptive Equipment: None  Home Layout: One level  Home Access: Stairs to enter with rails  Entrance Stairs-Rails: Right  Entrance Stairs-Number of Steps: 7  Bathroom Shower/Tub: Walk-in shower  Bathroom Toilet: Standard  Bathroom Equipment: Grab bars in shower  Prior Level of Function:  Prior Function Per Pt/Caregiver Report  Level of [place]: Independent with ADLs and functional transfers, Independent with homemaking with ambulation  ADL Assistance: Independent  Homemaking Assistance: Independent  Ambulatory Assistance: Independent  Vocational: Full time employment (security)  Prior Function Comments: -falls  Precautions:  Precautions  UE Weight Bearing Status: Weight Bearing as Tolerated (RUE)  Medical Precautions: Fall precautions       Objective   Pain:  Pain Assessment  Pain Assessment: 0-10  0-10 (Numeric) Pain Score: 8  Pain Type: Acute pain  Pain Location: Arm  Pain Orientation: Right  Cognition:  Cognition  Overall Cognitive Status: Within Functional Limits  Orientation Level: Oriented X4    General Assessments:     Activity Tolerance  Endurance: Endurance does not limit participation in activity    Sensation  Light Touch: No apparent deficits            Perception  Inattention/Neglect: Appears intact  Initiation: Appears intact  Motor Planning: Appears intact  Perseveration: Not present      Coordination  Movements are Fluid and Coordinated: No  Upper Body Coordination: guarding RUE    Postural Control  Postural Control: Within Functional Limits    Static Sitting Balance  Static Sitting-Balance Support: Feet supported  Static Sitting-Level of Assistance: Independent  Dynamic Sitting Balance  Dynamic Sitting-Balance Support: Feet supported  Dynamic Sitting-Level of  Assistance: Independent    Static Standing Balance  Static Standing-Balance Support: No upper extremity supported  Static Standing-Level of Assistance: Independent  Dynamic Standing Balance  Dynamic Standing-Balance Support: No upper extremity supported  Dynamic Standing-Level of Assistance: Independent  Dynamic Standing-Balance: Turning, Forward lean  Functional Assessments:  Bed Mobility  Bed Mobility: Yes  Bed Mobility 1  Bed Mobility 1: Supine to sitting, Sitting to supine  Level of Assistance 1: Modified independent  Bed Mobility Comments 1: HOB elevated    Transfers  Transfer: Yes  Transfer 1  Transfer From 1: Sit to, Stand to  Transfer to 1: Sit, Stand  Technique 1: Sit to stand, Stand to sit  Transfer Device 1:  (none)  Transfer Level of Assistance 1: Independent  Trials/Comments 1: x1    Ambulation/Gait Training  Ambulation/Gait Training Performed: Yes  Ambulation/Gait Training 1  Surface 1: Level tile  Device 1: No device  Assistance 1: Independent  Quality of Gait 1: Narrow base of support  Comments/Distance (ft) 1: 15ft    Stairs  Stairs: No  Extremity/Trunk Assessments:  RUE   RUE : Exceptions to WFL  RUE AROM (degrees)  RUE AROM Comment: pt lacks full end range elbow extension. unable to flex past 90 degrees flex 2/2 pain  RUE Strength  RUE Overall Strength: Greater than or equal to 3/5 as evidenced by functional mobility  LUE   LUE: Within Functional Limits  RLE   RLE : Within Functional Limits  LLE   LLE : Within Functional Limits  Outcome Measures:  [facility] Basic Mobility  Turning from your back to your side while in a flat bed without using bedrails: None  Moving from lying on your back to sitting on the side of a flat bed without using bedrails: None  Moving to and from bed to chair (including a wheelchair): None  Standing up from a chair using your arms (e.g. wheelchair or bedside chair): None  To walk in hospital room: None  Climbing 3-5 steps with railing: None  Basic Mobility - Total Score:  24    Encounter Problems       Encounter Problems (Active)       Mobility       STG - Patient will ambulate >/= 600 ft independently        Start:  05/23/25    Expected End:  06/06/25            STG - Patient will ascend and descend a flight of stairs  independently        Start:  05/23/25    Expected End:  06/06/25            pt will be independent with HEP for UE/Les       Start:  05/23/25    Expected End:  06/06/25               PT Transfers       STG - Patient will perform bed mobility independently        Start:  05/23/25    Expected End:  06/06/25            STG - Patient will transfer sit to and from stand independently        Start:  05/23/25    Expected End:  06/06/25               Pain - Adult              Education Documentation  Precautions, taught by Latonia Jones PT at 5/23/2025  1:55 PM.  Learner: Patient  Readiness: Acceptance  Method: Explanation  Response: Verbalizes Understanding  Comment: educated pt on gentle AROM of RUE elbow ext/flex in pain free arc, then steadily progress. educated pt on positioning of arm while supine    Body Mechanics, taught by Latonia Jones PT at 5/23/2025  1:55 PM.  Learner: Patient  Readiness: Acceptance  Method: Explanation  Response: Verbalizes Understanding  Comment: educated pt on gentle AROM of RUE elbow ext/flex in pain free arc, then steadily progress. educated pt on positioning of arm while supine    Mobility Training, taught by Latonia Jones PT at 5/23/2025  1:55 PM.  Learner: Patient  Readiness: Acceptance  Method: Explanation  Response: Verbalizes Understanding  Comment: educated pt on gentle AROM of RUE elbow ext/flex in pain free arc, then steadily progress. educated pt on positioning of arm while supine    Education Comments  No comments found.        05/23/25 at 1:58 PM - Latonia Jones PT

## 2025-05-23 NOTE — CARE PLAN
Problem: Pain - Adult  Goal: Verbalizes/displays adequate comfort level or baseline comfort level  Outcome: Progressing     Problem: Safety - Adult  Goal: Free from fall injury  Outcome: Progressing     Problem: Chronic Conditions and Co-morbidities  Goal: Patient's chronic conditions and co-morbidity symptoms are monitored and maintained or improved  Outcome: Progressing   The patient's goals for the shift include      Problem: Nutrition  Goal: Nutrient intake appropriate for maintaining nutritional needs  Outcome: Progressing

## 2025-05-24 LAB
ALBUMIN SERPL BCP-MCNC: 4 G/DL (ref 3.4–5)
ALP SERPL-CCNC: 215 U/L (ref 33–120)
ALT SERPL W P-5'-P-CCNC: 92 U/L (ref 10–52)
ANION GAP SERPL CALC-SCNC: 12 MMOL/L (ref 10–20)
AST SERPL W P-5'-P-CCNC: 97 U/L (ref 9–39)
BASOPHILS # BLD AUTO: 0.1 X10*3/UL (ref 0–0.1)
BASOPHILS NFR BLD AUTO: 0.8 %
BILIRUB SERPL-MCNC: 7.5 MG/DL (ref 0–1.2)
BUN SERPL-MCNC: 9 MG/DL (ref 6–23)
CALCIUM SERPL-MCNC: 10.2 MG/DL (ref 8.6–10.6)
CHLORIDE SERPL-SCNC: 100 MMOL/L (ref 98–107)
CO2 SERPL-SCNC: 27 MMOL/L (ref 21–32)
CREAT SERPL-MCNC: 0.5 MG/DL (ref 0.5–1.3)
EGFRCR SERPLBLD CKD-EPI 2021: >90 ML/MIN/1.73M*2
EOSINOPHIL # BLD AUTO: 1.01 X10*3/UL (ref 0–0.7)
EOSINOPHIL NFR BLD AUTO: 8.3 %
ERYTHROCYTE [DISTWIDTH] IN BLOOD BY AUTOMATED COUNT: 20.8 % (ref 11.5–14.5)
GLUCOSE SERPL-MCNC: 86 MG/DL (ref 74–99)
HCT VFR BLD AUTO: 21.9 % (ref 41–52)
HGB BLD-MCNC: 7.4 G/DL (ref 13.5–17.5)
HGB RETIC QN: 32 PG (ref 28–38)
IMM GRANULOCYTES # BLD AUTO: 0.07 X10*3/UL (ref 0–0.7)
IMM GRANULOCYTES NFR BLD AUTO: 0.6 % (ref 0–0.9)
IMMATURE RETIC FRACTION: 23.2 %
LDH SERPL L TO P-CCNC: 429 U/L (ref 84–246)
LYMPHOCYTES # BLD AUTO: 3.2 X10*3/UL (ref 1.2–4.8)
LYMPHOCYTES NFR BLD AUTO: 26.2 %
MCH RBC QN AUTO: 29.2 PG (ref 26–34)
MCHC RBC AUTO-ENTMCNC: 33.8 G/DL (ref 32–36)
MCV RBC AUTO: 87 FL (ref 80–100)
MONOCYTES # BLD AUTO: 1.4 X10*3/UL (ref 0.1–1)
MONOCYTES NFR BLD AUTO: 11.5 %
NEUTROPHILS # BLD AUTO: 6.42 X10*3/UL (ref 1.2–7.7)
NEUTROPHILS NFR BLD AUTO: 52.6 %
NRBC BLD-RTO: 2 /100 WBCS (ref 0–0)
PLATELET # BLD AUTO: 775 X10*3/UL (ref 150–450)
POTASSIUM SERPL-SCNC: 4.4 MMOL/L (ref 3.5–5.3)
PROT SERPL-MCNC: 6.7 G/DL (ref 6.4–8.2)
RBC # BLD AUTO: 2.53 X10*6/UL (ref 4.5–5.9)
RETICS #: 0.42 X10*6/UL (ref 0.02–0.12)
RETICS/RBC NFR AUTO: 16.5 % (ref 0.5–2)
SODIUM SERPL-SCNC: 135 MMOL/L (ref 136–145)
WBC # BLD AUTO: 12.2 X10*3/UL (ref 4.4–11.3)

## 2025-05-24 PROCEDURE — 2500000001 HC RX 250 WO HCPCS SELF ADMINISTERED DRUGS (ALT 637 FOR MEDICARE OP)

## 2025-05-24 PROCEDURE — 99233 SBSQ HOSP IP/OBS HIGH 50: CPT

## 2025-05-24 PROCEDURE — 85045 AUTOMATED RETICULOCYTE COUNT: CPT

## 2025-05-24 PROCEDURE — 2500000004 HC RX 250 GENERAL PHARMACY W/ HCPCS (ALT 636 FOR OP/ED): Mod: JZ

## 2025-05-24 PROCEDURE — 2500000004 HC RX 250 GENERAL PHARMACY W/ HCPCS (ALT 636 FOR OP/ED): Mod: JZ,TB

## 2025-05-24 PROCEDURE — 1170000001 HC PRIVATE ONCOLOGY ROOM DAILY

## 2025-05-24 PROCEDURE — 2500000005 HC RX 250 GENERAL PHARMACY W/O HCPCS

## 2025-05-24 PROCEDURE — 2500000001 HC RX 250 WO HCPCS SELF ADMINISTERED DRUGS (ALT 637 FOR MEDICARE OP): Performed by: NURSE PRACTITIONER

## 2025-05-24 PROCEDURE — 80053 COMPREHEN METABOLIC PANEL: CPT

## 2025-05-24 PROCEDURE — 83615 LACTATE (LD) (LDH) ENZYME: CPT

## 2025-05-24 PROCEDURE — 85025 COMPLETE CBC W/AUTO DIFF WBC: CPT

## 2025-05-24 RX ORDER — HYDROMORPHONE HYDROCHLORIDE 4 MG/1
8 TABLET ORAL EVERY 4 HOURS PRN
Refills: 0 | Status: DISCONTINUED | OUTPATIENT
Start: 2025-05-24 | End: 2025-05-25

## 2025-05-24 RX ADMIN — HYDROMORPHONE HYDROCHLORIDE 4 MG: 2 INJECTION, SOLUTION INTRAMUSCULAR; INTRAVENOUS; SUBCUTANEOUS at 13:32

## 2025-05-24 RX ADMIN — ERTAPENEM SODIUM 1 G: 1 INJECTION INTRAMUSCULAR; INTRAVENOUS at 14:16

## 2025-05-24 RX ADMIN — HYDROMORPHONE HYDROCHLORIDE 8 MG: 4 TABLET ORAL at 18:24

## 2025-05-24 RX ADMIN — ASPIRIN 81 MG CHEWABLE TABLET 81 MG: 81 TABLET CHEWABLE at 08:57

## 2025-05-24 RX ADMIN — OXYCODONE HYDROCHLORIDE 30 MG: 20 TABLET, FILM COATED, EXTENDED RELEASE ORAL at 20:15

## 2025-05-24 RX ADMIN — HYDROMORPHONE HYDROCHLORIDE 8 MG: 4 TABLET ORAL at 11:40

## 2025-05-24 RX ADMIN — DAPTOMYCIN 435 MG: 500 INJECTION, POWDER, LYOPHILIZED, FOR SOLUTION INTRAVENOUS at 13:28

## 2025-05-24 RX ADMIN — HYDROMORPHONE HYDROCHLORIDE 8 MG: 4 TABLET ORAL at 23:38

## 2025-05-24 RX ADMIN — HYDROMORPHONE HYDROCHLORIDE 8 MG: 4 TABLET ORAL at 03:33

## 2025-05-24 RX ADMIN — Medication 1 TABLET: at 20:15

## 2025-05-24 RX ADMIN — HYDROMORPHONE HYDROCHLORIDE 4 MG: 2 INJECTION, SOLUTION INTRAMUSCULAR; INTRAVENOUS; SUBCUTANEOUS at 20:24

## 2025-05-24 RX ADMIN — HYDROMORPHONE HYDROCHLORIDE 4 MG: 2 INJECTION, SOLUTION INTRAMUSCULAR; INTRAVENOUS; SUBCUTANEOUS at 06:27

## 2025-05-24 RX ADMIN — OXYCODONE HYDROCHLORIDE 30 MG: 20 TABLET, FILM COATED, EXTENDED RELEASE ORAL at 08:57

## 2025-05-24 RX ADMIN — FOLIC ACID 1 MG: 1 TABLET ORAL at 08:58

## 2025-05-24 RX ADMIN — Medication 1 TABLET: at 08:58

## 2025-05-24 RX ADMIN — ASPIRIN 81 MG CHEWABLE TABLET 81 MG: 81 TABLET CHEWABLE at 20:24

## 2025-05-24 RX ADMIN — LIDOCAINE 4% 2 PATCH: 40 PATCH TOPICAL at 18:24

## 2025-05-24 ASSESSMENT — PAIN - FUNCTIONAL ASSESSMENT
PAIN_FUNCTIONAL_ASSESSMENT: 0-10

## 2025-05-24 ASSESSMENT — PAIN SCALES - GENERAL
PAINLEVEL_OUTOF10: 9
PAINLEVEL_OUTOF10: 5 - MODERATE PAIN
PAINLEVEL_OUTOF10: 9
PAINLEVEL_OUTOF10: 8
PAINLEVEL_OUTOF10: 0 - NO PAIN
PAINLEVEL_OUTOF10: 0 - NO PAIN
PAINLEVEL_OUTOF10: 9
PAINLEVEL_OUTOF10: 9

## 2025-05-24 ASSESSMENT — COGNITIVE AND FUNCTIONAL STATUS - GENERAL
MOBILITY SCORE: 24
DAILY ACTIVITIY SCORE: 24

## 2025-05-24 ASSESSMENT — PAIN DESCRIPTION - ORIENTATION
ORIENTATION: RIGHT

## 2025-05-24 ASSESSMENT — PAIN DESCRIPTION - LOCATION
LOCATION: ARM

## 2025-05-24 NOTE — PROGRESS NOTES
"Joellen Narayan is a 24 y.o. male on day 7 of admission presenting with Sickle cell pain crisis (Multi).    Subjective   Seen this pt this AM at bedside. Pt states that pain is improving. Still complaining in mod/severe pain in R arm. Discussed plans to change pain regimen today and start to wean IV Dilaudid. Pt agreeable. No complaints of voiding, pt also denies constipation. Denies fever/chills, N/V/C/D, bleeding, bruising, SOB, CP, H/A or vision changes.     Objective     Physical Exam  Vitals reviewed.   Constitutional:       Appearance: Normal appearance.   HENT:      Head: Normocephalic and atraumatic.      Nose: Nose normal.      Mouth/Throat:      Mouth: Mucous membranes are moist.      Pharynx: Oropharynx is clear.   Eyes:      General: Scleral icterus present.      Extraocular Movements: Extraocular movements intact.      Pupils: Pupils are equal, round, and reactive to light.   Cardiovascular:      Rate and Rhythm: Normal rate and regular rhythm.      Pulses: Normal pulses.      Heart sounds: Normal heart sounds.   Pulmonary:      Effort: Pulmonary effort is normal.      Breath sounds: Normal breath sounds.   Abdominal:      General: Bowel sounds are normal.      Palpations: Abdomen is soft.   Musculoskeletal:         General: Normal range of motion.   Skin:     General: Skin is warm.      Comments: +R elbow dressing c/d/I   Neurological:      General: No focal deficit present.      Mental Status: He is alert and oriented to person, place, and time. Mental status is at baseline.   Psychiatric:         Mood and Affect: Mood normal.         Behavior: Behavior normal.       Last Recorded Vitals  Blood pressure 113/68, pulse 76, temperature 37.2 °C (99 °F), temperature source Temporal, resp. rate 16, height 1.88 m (6' 2\"), weight 67.2 kg (148 lb 2.4 oz), SpO2 95%.  Intake/Output last 3 Shifts:  I/O last 3 completed shifts:  In: 666 (9.9 mL/kg) [P.O.:666]  Out: - (0 mL/kg)   Weight: 67.2 kg     Results for " orders placed or performed during the hospital encounter of 05/17/25 (from the past 24 hours)   CBC and Auto Differential   Result Value Ref Range    WBC 12.2 (H) 4.4 - 11.3 x10*3/uL    nRBC 2.0 (H) 0.0 - 0.0 /100 WBCs    RBC 2.53 (L) 4.50 - 5.90 x10*6/uL    Hemoglobin 7.4 (L) 13.5 - 17.5 g/dL    Hematocrit 21.9 (L) 41.0 - 52.0 %    MCV 87 80 - 100 fL    MCH 29.2 26.0 - 34.0 pg    MCHC 33.8 32.0 - 36.0 g/dL    RDW 20.8 (H) 11.5 - 14.5 %    Platelets 775 (H) 150 - 450 x10*3/uL    Neutrophils % 52.6 40.0 - 80.0 %    Immature Granulocytes %, Automated 0.6 0.0 - 0.9 %    Lymphocytes % 26.2 13.0 - 44.0 %    Monocytes % 11.5 2.0 - 10.0 %    Eosinophils % 8.3 0.0 - 6.0 %    Basophils % 0.8 0.0 - 2.0 %    Neutrophils Absolute 6.42 1.20 - 7.70 x10*3/uL    Immature Granulocytes Absolute, Automated 0.07 0.00 - 0.70 x10*3/uL    Lymphocytes Absolute 3.20 1.20 - 4.80 x10*3/uL    Monocytes Absolute 1.40 (H) 0.10 - 1.00 x10*3/uL    Eosinophils Absolute 1.01 (H) 0.00 - 0.70 x10*3/uL    Basophils Absolute 0.10 0.00 - 0.10 x10*3/uL   Comprehensive Metabolic Panel   Result Value Ref Range    Glucose 86 74 - 99 mg/dL    Sodium 135 (L) 136 - 145 mmol/L    Potassium 4.4 3.5 - 5.3 mmol/L    Chloride 100 98 - 107 mmol/L    Bicarbonate 27 21 - 32 mmol/L    Anion Gap 12 10 - 20 mmol/L    Urea Nitrogen 9 6 - 23 mg/dL    Creatinine 0.50 0.50 - 1.30 mg/dL    eGFR >90 >60 mL/min/1.73m*2    Calcium 10.2 8.6 - 10.6 mg/dL    Albumin 4.0 3.4 - 5.0 g/dL    Alkaline Phosphatase 215 (H) 33 - 120 U/L    Total Protein 6.7 6.4 - 8.2 g/dL    AST 97 (H) 9 - 39 U/L    Bilirubin, Total 7.5 (H) 0.0 - 1.2 mg/dL    ALT 92 (H) 10 - 52 U/L   Lactate Dehydrogenase   Result Value Ref Range     (H) 84 - 246 U/L   Reticulocytes   Result Value Ref Range    Retic % 16.5 (H) 0.5 - 2.0 %    Retic Absolute 0.418 (H) 0.022 - 0.118 x10*6/uL    Reticulocyte Hemoglobin 32 28 - 38 pg    Immature Retic fraction 23.2 (H) <=16.0 %     *Note: Due to a large number of results  and/or encounters for the requested time period, some results have not been displayed. A complete set of results can be found in Results Review.       Scheduled medications  Scheduled Medications[1]  Continuous medications  Continuous Medications[2]  PRN medications  PRN Medications[3]      Assessment & Plan  Sickle cell pain crisis (Multi)    Joellen Narayan is a 24 y.o. 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 Hgb and lysis labs stable at baseline. Pt admitted for further management of pain. Started on IV dilaudid per care plan. ID consulted for atb ordering, rec Xray R elbow and ortho consult. Xray R elbow (5/18) showing postsurgical changes of the elbow with large joint effusion and soft tissue edema. Ortho consulted 5/18, rec no acute ortho intervention. Added oral dilaudid into pain regimen 5/19 in attempt to wean IV dilaudid, and pt began rotating IV/PO Q2 5/24. DC home with resumed O2 and home care with IV antibiotics pending improvement in pain.     Updates 5/24:  - Started Oxycontin, increased to 30mg BID (5/20), PA approved 5/21  - Continue to wean IV Dilaudid ; start rotating IV with PO (from IV, IV, PO regimen) today  - repeat CRP ordered, pending     # Septic Arthritis    - Recently admitted and discharged from hospital on 5/16 and treated for SA  - Ortho consulted 5/8, s/p aspirate (>44k cells)   - MRI w/anesthesia (5/9) R radius/humerus showing  concern for septic arthritis, osteomyelitis, myositis, severe muscle and subcutaneous soft tissue edema, and possible cellulitis   - s/p R elbow I&D 5/10 with ortho    - Tissue/ wound cultures NGTD, Fungal Cultures NGTD 5/12  - Final Ortho recs from 5/12; WBAT RUE, Mepilex remove POD7 (5/17/25), drain removed 5/12, require 6 weeks total of DVT prophylaxis from an orthopedic standpoint, Calcium/Vitamin D 500mg-400IU BID for 6 weeks (continued on admit), follow up w/ Dr. Tello 2 weeks after surgery for post-operative appointment (scheduled 6/9)   - Final ID recs for long term antibiotics on 5/13; IV daptomycin 6mg/kg q24h and ertapenem 1g q24h until 6/20/25--> continued on admission  - ID consulted 5/18 for atb ordering, rec Xray R elbow and ortho consult, weekly CK, signed off 5/19  - Xray R elbow (5/18) showing postsurgical changes of the elbow with large joint effusion and soft tissue edema  - Ortho consulted 5/18, rec no acute ortho intervention  - CRP 2.96, ESR 8, CK 49 (5/18) --> CRP 1.6 (5/20)--> repeat CRP (5/24) pending     # Hgb SS disease    # Post-surgical pain  - OARRS reviewed on admission, no aberrancies noted    - Hgb BL ~8; Hgb 7.2 (5/18)--> 7.6 (5/19)  - LDH BL ~500;  (5/18)--> 451 (5/19)  - Tbili BL ~8; Tbli 8.3 (5/18)--> 7.9 (5/19)  - Pain is likely post-surgical + sickle cell pain; when pt was dc on 5/16 his MS contin wasn't approved with PA and he didn't want to remain admitted (also was discharged right off IV dilaudid)--> so current pain is likely withdrawal-related  - s/p IV dilaudid 4mg q2hrs PRN severe pain (5/17)  - Increased IV dilaudid to 6mg q2hrs PRN severe pain (5/18-5/20)  - decreased IV dilaudid to 4mg q2h PRN for severe pain (5/20-current)  - started Oxycontin 20mg BID (5/19); PA approved, increased to 30mg BID 5/20 (PA approved 5/21)  - s/p PO dilaudid 8mg q4hrs PRN breakthrough pain- ordered specifically so that pt gets 2 doses of IV dilaudid followed by oral dilaudid in  attempt to wean IV dilaudid while Oxycontin builds up  - start IV Dilaudid 4mg Q2 rotating with PO Dilaudid 8mg (5/24- )  - Lidocaine patches x2  - Continue folic acid 1mg daily  - Utox (5/17): pending collection  - CXR (5/17): negative for acute process  - Bowel regimen for opioid-induced constipation with DocuSenna 2tabs BID and Miralax daily     # Hx of nocturnal oxygen dependence   - On 2-3L at night, however patient reports daytime hypoxia as well in the past, currently on RA  - CXR (5/17): negative for acute process     # Hx choledocholithiasis 7/2024 -improved    - Worsening hemolysis in setting of active infection could be contributing to increased hyperbilirubinemia    - July 2024 s/p ERCP with biliary sphincterotomy where sludge and stones were removed, achieving complete clearance    - 1/31/25 was planned for cholecystectomy with Dr. Dove but no show on day of surgery and again 2/13 and 2/27    - Tbili stable     # Hx Priapism   - No active issues on admit    - Hx previously drained by urology    - Continue home Sudafed 30mg daily PRN priapism     # Nodular lymphocyte predominant Hodgkins lymphoma (NLPHL)     - Follows with Dr. Stoll   - s/p Rituxan and prednisone q3 weeks (C1 1/18/24, C2 2/8/24, Missed C3 d/t sickle cell pain crisis, C4 4/4/24, C5 5/16/24, C6 6/7/24)    - 3/13 No show to onc appt    - 4/9 PET showed interval development of new FDG focus in mid abdomen   - Outpatient appointment scheduled 5/27       # Prophy   - ASA 81mg BID x6 weeks post-op (stop 6/21)  - SCDs, encouraged ambulation       DISPO:  - Code Status: Full Code (confirmed on admission)    - Access: LUE PICC  - DC home with resumed O2 and home care with IV antibiotics pending improvement in pain    - NOK: Melissa, mother, [phone]    - Onc FUV 5/27, 5/28 sickle cell, Ortho FUV 6/9, ID FUV 6/17, Pulm 8/12    I spent 60 minutes in the professional and overall care of this patient.    Assessment and plan as above discussed  with attending physician Dr. Parnell.    Brenda Ocasio, APRN-CNP         [1] aspirin, 81 mg, oral, BID  calcium carbonate-vitamin D3, 1 tablet, oral, BID  DAPTOmycin (Cubicin) 435 mg in sodium chloride 0.9% 50 mL IV, 6 mg/kg, intravenous, q24h  ertapenem, 1 g, intravenous, q24h  folic acid, 1 mg, oral, Daily  lidocaine, 2 patch, transdermal, Daily  oxyCODONE ER, 30 mg, oral, q12h REYNALDO  polyethylene glycol, 17 g, oral, Daily  sennosides-docusate sodium, 2 tablet, oral, BID     [2]    [3] PRN medications: diphenhydrAMINE, HYDROmorphone, HYDROmorphone, ondansetron ODT **OR** [DISCONTINUED] ondansetron, oxygen, pseudoephedrine

## 2025-05-24 NOTE — CARE PLAN
The patient's goals for the shift include    Problem: Pain - Adult  Goal: Verbalizes/displays adequate comfort level or baseline comfort level  Outcome: Progressing     Problem: Safety - Adult  Goal: Free from fall injury  Outcome: Progressing     Problem: Discharge Planning  Goal: Discharge to home or other facility with appropriate resources  Outcome: Progressing     Problem: Chronic Conditions and Co-morbidities  Goal: Patient's chronic conditions and co-morbidity symptoms are monitored and maintained or improved  Outcome: Progressing     Problem: Nutrition  Goal: Nutrient intake appropriate for maintaining nutritional needs  Outcome: Progressing     Problem: Pain  Goal: Takes deep breaths with improved pain control throughout the shift  Outcome: Progressing  Goal: Turns in bed with improved pain control throughout the shift  Outcome: Progressing  Goal: Walks with improved pain control throughout the shift  Outcome: Progressing  Goal: Performs ADL's with improved pain control throughout shift  Outcome: Progressing  Goal: Participates in PT with improved pain control throughout the shift  Outcome: Progressing  Goal: Free from opioid side effects throughout the shift  Outcome: Progressing  Goal: Free from acute confusion related to pain meds throughout the shift  Outcome: Progressing       The clinical goals for the shift include patient will remain HDS throughout shift

## 2025-05-24 NOTE — CARE PLAN
Problem: Pain - Adult  Goal: Verbalizes/displays adequate comfort level or baseline comfort level  Outcome: Progressing     Problem: Safety - Adult  Goal: Free from fall injury  Outcome: Progressing     Problem: Chronic Conditions and Co-morbidities  Goal: Patient's chronic conditions and co-morbidity symptoms are monitored and maintained or improved  Outcome: Progressing     Problem: Nutrition  Goal: Nutrient intake appropriate for maintaining nutritional needs  Outcome: Progressing     Problem: Pain  Goal: Takes deep breaths with improved pain control throughout the shift  Outcome: Progressing  Goal: Turns in bed with improved pain control throughout the shift  Outcome: Progressing  Goal: Walks with improved pain control throughout the shift  Outcome: Progressing  Goal: Performs ADL's with improved pain control throughout shift  Outcome: Progressing  Goal: Participates in PT with improved pain control throughout the shift  Outcome: Progressing  Goal: Free from opioid side effects throughout the shift  Outcome: Progressing  Goal: Free from acute confusion related to pain meds throughout the shift  Outcome: Progressing

## 2025-05-25 VITALS
TEMPERATURE: 99 F | RESPIRATION RATE: 16 BRPM | HEART RATE: 80 BPM | DIASTOLIC BLOOD PRESSURE: 68 MMHG | HEIGHT: 74 IN | SYSTOLIC BLOOD PRESSURE: 117 MMHG | BODY MASS INDEX: 19.01 KG/M2 | WEIGHT: 148.15 LBS | OXYGEN SATURATION: 96 %

## 2025-05-25 LAB
ALBUMIN SERPL BCP-MCNC: 4 G/DL (ref 3.4–5)
ALP SERPL-CCNC: 211 U/L (ref 33–120)
ALT SERPL W P-5'-P-CCNC: 84 U/L (ref 10–52)
ANION GAP SERPL CALC-SCNC: 12 MMOL/L (ref 10–20)
AST SERPL W P-5'-P-CCNC: 97 U/L (ref 9–39)
BASOPHILS # BLD AUTO: 0.09 X10*3/UL (ref 0–0.1)
BASOPHILS NFR BLD AUTO: 0.8 %
BILIRUB SERPL-MCNC: 6.7 MG/DL (ref 0–1.2)
BUN SERPL-MCNC: 8 MG/DL (ref 6–23)
CALCIUM SERPL-MCNC: 9.9 MG/DL (ref 8.6–10.6)
CHLORIDE SERPL-SCNC: 100 MMOL/L (ref 98–107)
CK SERPL-CCNC: 28 U/L (ref 0–325)
CO2 SERPL-SCNC: 28 MMOL/L (ref 21–32)
CREAT SERPL-MCNC: 0.61 MG/DL (ref 0.5–1.3)
CRP SERPL-MCNC: 0.97 MG/DL
EGFRCR SERPLBLD CKD-EPI 2021: >90 ML/MIN/1.73M*2
EOSINOPHIL # BLD AUTO: 1.01 X10*3/UL (ref 0–0.7)
EOSINOPHIL NFR BLD AUTO: 8.8 %
ERYTHROCYTE [DISTWIDTH] IN BLOOD BY AUTOMATED COUNT: 20.6 % (ref 11.5–14.5)
GLUCOSE SERPL-MCNC: 96 MG/DL (ref 74–99)
HCT VFR BLD AUTO: 21.3 % (ref 41–52)
HGB BLD-MCNC: 7.3 G/DL (ref 13.5–17.5)
HGB RETIC QN: 32 PG (ref 28–38)
IMM GRANULOCYTES # BLD AUTO: 0.07 X10*3/UL (ref 0–0.7)
IMM GRANULOCYTES NFR BLD AUTO: 0.6 % (ref 0–0.9)
IMMATURE RETIC FRACTION: 19.6 %
LDH SERPL L TO P-CCNC: 414 U/L (ref 84–246)
LYMPHOCYTES # BLD AUTO: 3.33 X10*3/UL (ref 1.2–4.8)
LYMPHOCYTES NFR BLD AUTO: 29.1 %
MCH RBC QN AUTO: 30.4 PG (ref 26–34)
MCHC RBC AUTO-ENTMCNC: 34.3 G/DL (ref 32–36)
MCV RBC AUTO: 89 FL (ref 80–100)
MONOCYTES # BLD AUTO: 1.39 X10*3/UL (ref 0.1–1)
MONOCYTES NFR BLD AUTO: 12.1 %
NEUTROPHILS # BLD AUTO: 5.57 X10*3/UL (ref 1.2–7.7)
NEUTROPHILS NFR BLD AUTO: 48.6 %
NRBC BLD-RTO: 2.3 /100 WBCS (ref 0–0)
PLATELET # BLD AUTO: 724 X10*3/UL (ref 150–450)
POTASSIUM SERPL-SCNC: 4.1 MMOL/L (ref 3.5–5.3)
PROT SERPL-MCNC: 6.7 G/DL (ref 6.4–8.2)
RBC # BLD AUTO: 2.4 X10*6/UL (ref 4.5–5.9)
RETICS #: 0.39 X10*6/UL (ref 0.02–0.12)
RETICS/RBC NFR AUTO: 16.1 % (ref 0.5–2)
SODIUM SERPL-SCNC: 136 MMOL/L (ref 136–145)
WBC # BLD AUTO: 11.5 X10*3/UL (ref 4.4–11.3)

## 2025-05-25 PROCEDURE — 85045 AUTOMATED RETICULOCYTE COUNT: CPT

## 2025-05-25 PROCEDURE — 2500000001 HC RX 250 WO HCPCS SELF ADMINISTERED DRUGS (ALT 637 FOR MEDICARE OP)

## 2025-05-25 PROCEDURE — 86140 C-REACTIVE PROTEIN: CPT

## 2025-05-25 PROCEDURE — 80053 COMPREHEN METABOLIC PANEL: CPT

## 2025-05-25 PROCEDURE — 85025 COMPLETE CBC W/AUTO DIFF WBC: CPT

## 2025-05-25 PROCEDURE — 99233 SBSQ HOSP IP/OBS HIGH 50: CPT

## 2025-05-25 PROCEDURE — 83615 LACTATE (LD) (LDH) ENZYME: CPT

## 2025-05-25 PROCEDURE — 82550 ASSAY OF CK (CPK): CPT

## 2025-05-25 PROCEDURE — 2500000001 HC RX 250 WO HCPCS SELF ADMINISTERED DRUGS (ALT 637 FOR MEDICARE OP): Performed by: NURSE PRACTITIONER

## 2025-05-25 PROCEDURE — 2500000005 HC RX 250 GENERAL PHARMACY W/O HCPCS

## 2025-05-25 PROCEDURE — 1170000001 HC PRIVATE ONCOLOGY ROOM DAILY

## 2025-05-25 PROCEDURE — 2500000004 HC RX 250 GENERAL PHARMACY W/ HCPCS (ALT 636 FOR OP/ED): Mod: JZ

## 2025-05-25 PROCEDURE — 2500000004 HC RX 250 GENERAL PHARMACY W/ HCPCS (ALT 636 FOR OP/ED): Mod: JZ,TB

## 2025-05-25 RX ORDER — OXYCODONE HYDROCHLORIDE 20 MG/1
20 TABLET ORAL EVERY 6 HOURS PRN
Qty: 28 TABLET | Refills: 0 | Status: SHIPPED | OUTPATIENT
Start: 2025-05-25 | End: 2025-06-01

## 2025-05-25 RX ORDER — OXYCODONE HYDROCHLORIDE 5 MG/1
20 TABLET ORAL EVERY 4 HOURS PRN
Refills: 0 | Status: DISCONTINUED | OUTPATIENT
Start: 2025-05-25 | End: 2025-05-26 | Stop reason: HOSPADM

## 2025-05-25 RX ADMIN — FOLIC ACID 1 MG: 1 TABLET ORAL at 09:04

## 2025-05-25 RX ADMIN — OXYCODONE HYDROCHLORIDE 30 MG: 20 TABLET, FILM COATED, EXTENDED RELEASE ORAL at 20:51

## 2025-05-25 RX ADMIN — ASPIRIN 81 MG CHEWABLE TABLET 81 MG: 81 TABLET CHEWABLE at 20:52

## 2025-05-25 RX ADMIN — HYDROMORPHONE HYDROCHLORIDE 8 MG: 4 TABLET ORAL at 03:23

## 2025-05-25 RX ADMIN — HYDROMORPHONE HYDROCHLORIDE 4 MG: 2 INJECTION, SOLUTION INTRAMUSCULAR; INTRAVENOUS; SUBCUTANEOUS at 23:30

## 2025-05-25 RX ADMIN — OXYCODONE HYDROCHLORIDE 30 MG: 20 TABLET, FILM COATED, EXTENDED RELEASE ORAL at 09:03

## 2025-05-25 RX ADMIN — Medication 1 TABLET: at 20:51

## 2025-05-25 RX ADMIN — SENNOSIDES AND DOCUSATE SODIUM 2 TABLET: 50; 8.6 TABLET ORAL at 20:52

## 2025-05-25 RX ADMIN — LIDOCAINE 4% 2 PATCH: 40 PATCH TOPICAL at 18:19

## 2025-05-25 RX ADMIN — DAPTOMYCIN 435 MG: 500 INJECTION, POWDER, LYOPHILIZED, FOR SOLUTION INTRAVENOUS at 13:20

## 2025-05-25 RX ADMIN — HYDROMORPHONE HYDROCHLORIDE 4 MG: 2 INJECTION, SOLUTION INTRAMUSCULAR; INTRAVENOUS; SUBCUTANEOUS at 14:06

## 2025-05-25 RX ADMIN — OXYCODONE 20 MG: 5 TABLET ORAL at 20:51

## 2025-05-25 RX ADMIN — ASPIRIN 81 MG CHEWABLE TABLET 81 MG: 81 TABLET CHEWABLE at 09:04

## 2025-05-25 RX ADMIN — ERTAPENEM SODIUM 1 G: 1 INJECTION INTRAMUSCULAR; INTRAVENOUS at 14:06

## 2025-05-25 RX ADMIN — HYDROMORPHONE HYDROCHLORIDE 4 MG: 2 INJECTION, SOLUTION INTRAMUSCULAR; INTRAVENOUS; SUBCUTANEOUS at 05:52

## 2025-05-25 RX ADMIN — HYDROMORPHONE HYDROCHLORIDE 4 MG: 2 INJECTION, SOLUTION INTRAMUSCULAR; INTRAVENOUS; SUBCUTANEOUS at 00:36

## 2025-05-25 RX ADMIN — OXYCODONE 20 MG: 5 TABLET ORAL at 10:56

## 2025-05-25 RX ADMIN — Medication 1 TABLET: at 09:04

## 2025-05-25 ASSESSMENT — PAIN - FUNCTIONAL ASSESSMENT
PAIN_FUNCTIONAL_ASSESSMENT: 0-10

## 2025-05-25 ASSESSMENT — COGNITIVE AND FUNCTIONAL STATUS - GENERAL
DAILY ACTIVITIY SCORE: 24
MOBILITY SCORE: 24

## 2025-05-25 ASSESSMENT — PAIN SCALES - GENERAL
PAINLEVEL_OUTOF10: 8
PAINLEVEL_OUTOF10: 0 - NO PAIN
PAINLEVEL_OUTOF10: 9
PAINLEVEL_OUTOF10: 8
PAINLEVEL_OUTOF10: 9
PAINLEVEL_OUTOF10: 9
PAINLEVEL_OUTOF10: 0 - NO PAIN
PAINLEVEL_OUTOF10: 9
PAINLEVEL_OUTOF10: 8

## 2025-05-25 ASSESSMENT — PAIN DESCRIPTION - ORIENTATION
ORIENTATION: RIGHT

## 2025-05-25 ASSESSMENT — PAIN DESCRIPTION - LOCATION
LOCATION: ELBOW

## 2025-05-25 NOTE — PROGRESS NOTES
Per medical team possible DC 5/26. Updated [facility]. Per notes patient has had teaching and can resume at DC. Will confirm DC and delivery of IVAB. Per previous communication may have doses at home already.

## 2025-05-25 NOTE — PROGRESS NOTES
"Joellen Narayan is a 24 y.o. male on day 8 of admission presenting with Sickle cell pain crisis (Multi).    Subjective   Patient seen resting in bed. Reports 6/10 this morning, primarily localized to his elbow. Feels that his pain has been better controlled then when he was first admitted but states that he does not get relief when he takes PO dilaudid. We discussed continuing IV / PO rotating regimen today but rotating IV with PO oxy as to better reflect his home regimen and have a better idea if his pain is adequately controlled prior to discharge. Patient agreeable to plan. He otherwise denies shortness of breath, chest pain, abdominal pain, N/V/D, F/C, H/a.        Objective     Physical Exam  Constitutional:       Appearance: Normal appearance.   HENT:      Head: Normocephalic.      Mouth/Throat:      Mouth: Mucous membranes are moist.   Eyes:      General: Scleral icterus present.      Pupils: Pupils are equal, round, and reactive to light.   Cardiovascular:      Rate and Rhythm: Normal rate and regular rhythm.   Pulmonary:      Effort: Pulmonary effort is normal.      Breath sounds: Normal breath sounds.   Abdominal:      General: Abdomen is flat. Bowel sounds are normal.      Palpations: Abdomen is soft.   Musculoskeletal:      Cervical back: Normal range of motion and neck supple.      Comments: +R elbow dressing c/d/I    Skin:     General: Skin is warm.   Neurological:      General: No focal deficit present.      Mental Status: He is alert.   Psychiatric:         Mood and Affect: Mood normal.         Last Recorded Vitals  Blood pressure 114/73, pulse 79, temperature 36.7 °C (98.1 °F), temperature source Temporal, resp. rate 16, height 1.88 m (6' 2\"), weight 67.2 kg (148 lb 2.4 oz), SpO2 94%.  Intake/Output last 3 Shifts:  I/O last 3 completed shifts:  In: - (0 mL/kg)   Out: 1500 (22.3 mL/kg) [Urine:1500 (0.9 mL/kg/hr)]  Weight: 67.2 kg     Relevant Results               This patient has a central " line   Reason for the central line remaining today? IV antibiotics                  Assessment & Plan  Sickle cell pain crisis (Multi)    Joellen Narayan is a 24 y.o. 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 Hgb and lysis labs stable at baseline. Pt admitted for further management of pain. Started on IV dilaudid per care plan. ID consulted for atb ordering, rec Xray R elbow and ortho consult. Xray R elbow (5/18) showing postsurgical changes of the elbow with large joint effusion and soft tissue edema. Ortho consulted 5/18, rec no acute ortho intervention. Added oral dilaudid into pain regimen 5/19 in attempt to wean IV dilaudid, and pt began rotating IV/PO Q2 5/24. On 5/25 PO dilaudid changed to PO oxycodone in anticipation of homegoing needs. DC home with resumed O2 and home care with IV antibiotics pending improvement in pain, possible 5/26.     Updates 5/25:  - Continue IV / PO rotating regimen, however, PO dilaudid changed to PO oxycodone 20mg (patients home regimen) to better reflect his home regimen to have a better idea if his pain is adequately controlled prior to discharge  - Repeat CRP today 0.97      # Septic Arthritis    - Recently admitted and discharged from hospital on 5/16 and treated for SA  - Ortho consulted 5/8, s/p aspirate (>44k cells)   - MRI w/anesthesia (5/9) R radius/humerus showing concern for septic arthritis, osteomyelitis, myositis,  severe muscle and subcutaneous soft tissue edema, and possible cellulitis   - s/p R elbow I&D 5/10 with ortho    - Tissue/ wound cultures NGTD, Fungal Cultures NGTD 5/12  - Final Ortho recs from 5/12; WBAT RUE, Mepilex remove POD7 (5/17/25), drain removed 5/12, require 6 weeks total of DVT prophylaxis from an orthopedic standpoint, Calcium/Vitamin D 500mg-400IU BID for 6 weeks (continued on admit), follow up w/ Dr. Tello 2 weeks after surgery for post-operative appointment (scheduled 6/9)   - Final ID recs for long term antibiotics on 5/13; IV daptomycin 6mg/kg q24h and ertapenem 1g q24h until 6/20/25--> continued on admission  - ID consulted 5/18 for atb ordering, rec Xray R elbow and ortho consult, weekly CK, signed off 5/19  - Xray R elbow (5/18) showing postsurgical changes of the elbow with large joint effusion and soft tissue edema  - Ortho consulted 5/18, rec no acute ortho intervention  - CRP 2.96, ESR 8, CK 49 (5/18) --> CRP 1.6 (5/20)--> repeat CRP 0.97 (5/25)      # Hgb SS disease    # Post-surgical pain  - OARRS reviewed on admission, no aberrancies noted    - Hgb BL ~8; Hgb 7.2 (5/18)--> 7.6 (5/19)  - LDH BL ~500;  (5/18)--> 451 (5/19)  - Tbili BL ~8; Tbli 8.3 (5/18)--> 7.9 (5/19)  - Pain is likely post-surgical + sickle cell pain; when pt was dc on 5/16 his MS contin wasn't approved with PA and he didn't want to remain admitted (also was discharged right off IV dilaudid)--> so current pain is likely withdrawal-related  - s/p IV dilaudid 4mg q2hrs PRN severe pain (5/17)  - Increased IV dilaudid to 6mg q2hrs PRN severe pain (5/18-5/20)  - decreased IV dilaudid to 4mg q2h PRN for severe pain (5/20-current)  - started Oxycontin 20mg BID (5/19); PA approved, increased to 30mg BID 5/20 (PA approved 5/21)  - s/p PO dilaudid 8mg q4hrs PRN breakthrough pain- ordered specifically so that pt gets 2 doses of IV dilaudid followed by oral dilaudid in attempt to wean IV dilaudid while Oxycontin builds up  -  s/p IV Dilaudid 4mg Q2 rotating with PO Dilaudid 8mg (5/24-5/25 )  - 5/25 start PO oxycodone 20mg q4 hours (patients home oral regimen) to rotate q2 hours with IV Dilaudid 4mg q4 hours (5/25--)   - Lidocaine patches x2  - Continue folic acid 1mg daily  - Utox (5/17): pending collection  - CXR (5/17): negative for acute process  - Bowel regimen for opioid-induced constipation with DocuSenna 2tabs BID and Miralax daily     # Hx of nocturnal oxygen dependence   - On 2-3L at night, however patient reports daytime hypoxia as well in the past, currently on RA  - CXR (5/17): negative for acute process     # Hx choledocholithiasis 7/2024 -improved    - Worsening hemolysis in setting of active infection could be contributing to increased hyperbilirubinemia    - July 2024 s/p ERCP with biliary sphincterotomy where sludge and stones were removed, achieving complete clearance    - 1/31/25 was planned for cholecystectomy with Dr. Dove but no show on day of surgery and again 2/13 and 2/27    - Tbili stable     # Hx Priapism   - No active issues on admit    - Hx previously drained by urology    - Continue home Sudafed 30mg daily PRN priapism     # Nodular lymphocyte predominant Hodgkins lymphoma (NLPHL)     - Follows with Dr. Stoll   - s/p Rituxan and prednisone q3 weeks (C1 1/18/24, C2 2/8/24, Missed C3 d/t sickle cell pain crisis, C4 4/4/24, C5 5/16/24, C6 6/7/24)    - 3/13 No show to onc appt    - 4/9 PET showed interval development of new FDG focus in mid abdomen   - Outpatient appointment scheduled 5/27       # Prophy   - ASA 81mg BID x6 weeks post-op (stop 6/21)  - SCDs, encouraged ambulation       DISPO:  - Code Status: Full Code (confirmed on admission)    - Access: LUE PICC  - DC home with resumed O2 and home care with IV antibiotics pending improvement in pain    - NOK: Melissa, mother, [phone]    - Onc FUV 5/27, 5/28 sickle cell, Ortho FUV 6/9, ID FUV 6/17, Pulm 8/12      I spent 60 minutes in the professional  and overall care of this patient.    Assessment and plan as above discussed with attending physician, Dr. Soheila Stoll PA-C

## 2025-05-26 ENCOUNTER — HOME INFUSION (OUTPATIENT)
Dept: INFUSION THERAPY | Age: 25
End: 2025-05-26
Payer: COMMERCIAL

## 2025-05-26 ENCOUNTER — PHARMACY VISIT (OUTPATIENT)
Dept: PHARMACY | Facility: CLINIC | Age: 25
End: 2025-05-26
Payer: MEDICARE

## 2025-05-26 ENCOUNTER — DOCUMENTATION (OUTPATIENT)
Dept: HOME HEALTH SERVICES | Facility: HOME HEALTH | Age: 25
End: 2025-05-26
Payer: COMMERCIAL

## 2025-05-26 VITALS
OXYGEN SATURATION: 95 % | BODY MASS INDEX: 18.62 KG/M2 | RESPIRATION RATE: 18 BRPM | WEIGHT: 145.06 LBS | HEART RATE: 82 BPM | HEIGHT: 74 IN | DIASTOLIC BLOOD PRESSURE: 79 MMHG | SYSTOLIC BLOOD PRESSURE: 124 MMHG | TEMPERATURE: 98.1 F

## 2025-05-26 LAB
ALBUMIN SERPL BCP-MCNC: 4.1 G/DL (ref 3.4–5)
ALP SERPL-CCNC: 209 U/L (ref 33–120)
ALT SERPL W P-5'-P-CCNC: 72 U/L (ref 10–52)
ANION GAP SERPL CALC-SCNC: 14 MMOL/L (ref 10–20)
AST SERPL W P-5'-P-CCNC: 78 U/L (ref 9–39)
BASOPHILS # BLD AUTO: 0.07 X10*3/UL (ref 0–0.1)
BASOPHILS NFR BLD AUTO: 0.6 %
BILIRUB SERPL-MCNC: 6.9 MG/DL (ref 0–1.2)
BUN SERPL-MCNC: 7 MG/DL (ref 6–23)
CALCIUM SERPL-MCNC: 9.8 MG/DL (ref 8.6–10.6)
CHLORIDE SERPL-SCNC: 102 MMOL/L (ref 98–107)
CO2 SERPL-SCNC: 27 MMOL/L (ref 21–32)
CREAT SERPL-MCNC: 0.6 MG/DL (ref 0.5–1.3)
EGFRCR SERPLBLD CKD-EPI 2021: >90 ML/MIN/1.73M*2
EOSINOPHIL # BLD AUTO: 1 X10*3/UL (ref 0–0.7)
EOSINOPHIL NFR BLD AUTO: 8 %
ERYTHROCYTE [DISTWIDTH] IN BLOOD BY AUTOMATED COUNT: 19.9 % (ref 11.5–14.5)
GLUCOSE SERPL-MCNC: 86 MG/DL (ref 74–99)
HCT VFR BLD AUTO: 21.9 % (ref 41–52)
HGB BLD-MCNC: 7.3 G/DL (ref 13.5–17.5)
HGB RETIC QN: 32 PG (ref 28–38)
IMM GRANULOCYTES # BLD AUTO: 0.06 X10*3/UL (ref 0–0.7)
IMM GRANULOCYTES NFR BLD AUTO: 0.5 % (ref 0–0.9)
IMMATURE RETIC FRACTION: 17.7 %
LDH SERPL L TO P-CCNC: 358 U/L (ref 84–246)
LYMPHOCYTES # BLD AUTO: 3.54 X10*3/UL (ref 1.2–4.8)
LYMPHOCYTES NFR BLD AUTO: 28.2 %
MCH RBC QN AUTO: 29.8 PG (ref 26–34)
MCHC RBC AUTO-ENTMCNC: 33.3 G/DL (ref 32–36)
MCV RBC AUTO: 89 FL (ref 80–100)
MONOCYTES # BLD AUTO: 1.36 X10*3/UL (ref 0.1–1)
MONOCYTES NFR BLD AUTO: 10.8 %
NEUTROPHILS # BLD AUTO: 6.52 X10*3/UL (ref 1.2–7.7)
NEUTROPHILS NFR BLD AUTO: 51.9 %
NRBC BLD-RTO: 1.8 /100 WBCS (ref 0–0)
PLATELET # BLD AUTO: 681 X10*3/UL (ref 150–450)
POTASSIUM SERPL-SCNC: 4.5 MMOL/L (ref 3.5–5.3)
PROT SERPL-MCNC: 6.7 G/DL (ref 6.4–8.2)
RBC # BLD AUTO: 2.45 X10*6/UL (ref 4.5–5.9)
RETICS #: 0.4 X10*6/UL (ref 0.02–0.12)
RETICS/RBC NFR AUTO: 16.3 % (ref 0.5–2)
SODIUM SERPL-SCNC: 138 MMOL/L (ref 136–145)
WBC # BLD AUTO: 12.6 X10*3/UL (ref 4.4–11.3)

## 2025-05-26 PROCEDURE — 85045 AUTOMATED RETICULOCYTE COUNT: CPT

## 2025-05-26 PROCEDURE — 83615 LACTATE (LD) (LDH) ENZYME: CPT

## 2025-05-26 PROCEDURE — 99239 HOSP IP/OBS DSCHRG MGMT >30: CPT

## 2025-05-26 PROCEDURE — 80053 COMPREHEN METABOLIC PANEL: CPT

## 2025-05-26 PROCEDURE — 2500000004 HC RX 250 GENERAL PHARMACY W/ HCPCS (ALT 636 FOR OP/ED): Mod: JZ

## 2025-05-26 PROCEDURE — 2500000001 HC RX 250 WO HCPCS SELF ADMINISTERED DRUGS (ALT 637 FOR MEDICARE OP)

## 2025-05-26 PROCEDURE — 85025 COMPLETE CBC W/AUTO DIFF WBC: CPT

## 2025-05-26 PROCEDURE — 2500000001 HC RX 250 WO HCPCS SELF ADMINISTERED DRUGS (ALT 637 FOR MEDICARE OP): Performed by: NURSE PRACTITIONER

## 2025-05-26 RX ADMIN — DAPTOMYCIN 435 MG: 500 INJECTION, POWDER, LYOPHILIZED, FOR SOLUTION INTRAVENOUS at 13:07

## 2025-05-26 RX ADMIN — SENNOSIDES AND DOCUSATE SODIUM 2 TABLET: 50; 8.6 TABLET ORAL at 08:56

## 2025-05-26 RX ADMIN — Medication 1 TABLET: at 08:56

## 2025-05-26 RX ADMIN — FOLIC ACID 1 MG: 1 TABLET ORAL at 08:56

## 2025-05-26 RX ADMIN — POLYETHYLENE GLYCOL 3350 17 G: 17 POWDER, FOR SOLUTION ORAL at 08:56

## 2025-05-26 RX ADMIN — ERTAPENEM SODIUM 1 G: 1 INJECTION INTRAMUSCULAR; INTRAVENOUS at 13:54

## 2025-05-26 RX ADMIN — ASPIRIN 81 MG CHEWABLE TABLET 81 MG: 81 TABLET CHEWABLE at 08:56

## 2025-05-26 RX ADMIN — OXYCODONE HYDROCHLORIDE 30 MG: 20 TABLET, FILM COATED, EXTENDED RELEASE ORAL at 08:56

## 2025-05-26 ASSESSMENT — COGNITIVE AND FUNCTIONAL STATUS - GENERAL
DAILY ACTIVITIY SCORE: 24
MOBILITY SCORE: 24

## 2025-05-26 ASSESSMENT — PAIN - FUNCTIONAL ASSESSMENT
PAIN_FUNCTIONAL_ASSESSMENT: 0-10
PAIN_FUNCTIONAL_ASSESSMENT: 0-10

## 2025-05-26 ASSESSMENT — PAIN SCALES - GENERAL
PAINLEVEL_OUTOF10: 8
PAINLEVEL_OUTOF10: 2
PAINLEVEL_OUTOF10: 2

## 2025-05-26 NOTE — PROGRESS NOTES
Plan for DC today after IVAB; [facility] aware and planning SOC Tues. .     Patient aware that D[facility] at home is  and plan to deliver new IVAB tomorrow. Aware not to use.

## 2025-05-26 NOTE — CARE PLAN
The patient's goals for the shift include      Problem: Pain - Adult  Goal: Verbalizes/displays adequate comfort level or baseline comfort level  Outcome: Progressing     Problem: Safety - Adult  Goal: Free from fall injury  Outcome: Progressing     Problem: Discharge Planning  Goal: Discharge to home or other facility with appropriate resources  Outcome: Progressing     Problem: Nutrition  Goal: Nutrient intake appropriate for maintaining nutritional needs  Outcome: Progressing     Problem: Pain  Goal: Takes deep breaths with improved pain control throughout the shift  Outcome: Progressing  Goal: Turns in bed with improved pain control throughout the shift  Outcome: Progressing  Goal: Walks with improved pain control throughout the shift  Outcome: Progressing  Goal: Performs ADL's with improved pain control throughout shift  Outcome: Progressing  Goal: Participates in PT with improved pain control throughout the shift  Outcome: Progressing  Goal: Free from opioid side effects throughout the shift  Outcome: Progressing  Goal: Free from acute confusion related to pain meds throughout the shift  Outcome: Progressing     Problem: Fall/Injury  Goal: Not fall by end of shift  Outcome: Progressing  Goal: Be free from injury by end of the shift  Outcome: Progressing  Goal: Verbalize understanding of personal risk factors for fall in the hospital  Outcome: Progressing  Goal: Verbalize understanding of risk factor reduction measures to prevent injury from fall in the home  Outcome: Progressing  Goal: Use assistive devices by end of the shift  Outcome: Progressing  Goal: Pace activities to prevent fatigue by end of the shift  Outcome: Progressing     The clinical goals for the shift include Patient will remain hemodynamically stable throughout the shift

## 2025-05-26 NOTE — DISCHARGE SUMMARY
Discharge Diagnosis  Sickle cell pain crisis (Multi)    Issues Requiring Follow-Up  Sickle Cell   Osteomyelitis of right elbow     Test Results Pending At Discharge  Pending Labs       No current pending labs.            Hospital Course   Joellen Narayan is a 24 y.o. 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 Hgb and lysis labs stable at baseline. Pt admitted for further management of pain. Started on IV dilaudid per care plan. ID consulted for atb ordering, rec Xray R elbow and ortho consult. Xray R elbow (5/18) showing postsurgical changes of the elbow with large joint effusion and soft tissue edema. Ortho consulted 5/18, rec no acute ortho intervention. Added oral dilaudid into pain regimen 5/19 in attempt to wean IV dilaudid, and pt began rotating IV/PO Q2 5/24. On 5/25 PO dilaudid changed to PO oxycodone in anticipation of homegoing needs. Hemoglobin and lysis labs at patient's baseline on day of discharge and  patient reports that pain is well controlled.     Patient will discharge with resumed [facility] for PICC line care and IV ertapenem and daptomycin (stop date of 6/20). [facility] contacted by care coordination on day of discharge of resumed service needs.     Prior to discharge Rx of OxyContin 30mg BID x14 days (28T) delivered to patient's bedside by [facility] pharmacy prior to discharge. He last refilled home oxycodone on 5/16 for 7 day supply (patient readmitted  on 5/17 so patient with adequate supply at home.) Declines any further refill needs.     He has Follow-ups as listed below.     Pertinent Physical Exam At Time of Discharge  Physical Exam  Constitutional:       Appearance: Normal appearance.   HENT:      Head: Normocephalic.      Mouth/Throat:      Mouth: Mucous membranes are moist.   Eyes:      Pupils: Pupils are equal, round, and reactive to light.   Cardiovascular:      Rate and Rhythm: Normal rate and regular rhythm.      Pulses: Normal pulses.      Heart sounds: Normal heart sounds.   Pulmonary:      Effort: Pulmonary effort is normal.      Breath sounds: Normal breath sounds.   Abdominal:      General: Abdomen is flat. Bowel sounds are normal.      Palpations: Abdomen is soft.   Musculoskeletal:      Cervical back: Normal range of motion and neck supple.      Comments: PICC line in place    Skin:     General: Skin is warm.   Neurological:      General: No focal deficit present.      Mental Status: He is alert.   Psychiatric:         Mood and Affect: Mood normal.         Home Medications     Medication List      CONTINUE taking these medications     aspirin 81 mg chewable tablet; Chew 1 tablet (81 mg) 2 times a day.   ertapenem IV; Commonly known as: INVanz; Infuse 50 mL (1 g) over 30   minutes into a venous catheter once every 24 hours.   folic acid 1 mg tablet; Commonly known as: Folvite; Take 1 tablet (1 mg)   by mouth once daily.   heparin LockFlush(Porcine)(PF) 10 unit/mL injection; Generic drug:   heparin flush   Normal Saline Flush flush; Generic drug: sodium chloride 0.9%   * oxyCODONE ER 30 mg 12 hr tablet; Commonly known as: OxyCONTIN; Take 1   tablet (30 mg) by mouth every 12 hours for 14 days. Do not crush, chew, or   split.   * oxyCODONE 20 mg immediate release tablet; Commonly known as:   Roxicodone; Take 1 tablet (20 mg) by mouth every 6 hours if needed for   severe pain (7 - 10) for up to 7 days.   oxygen gas therapy; Commonly known as: O2    Oyster Shell Calcium-Vit D3 500 mg-10 mcg (400 unit) tablet; Generic   drug: calcium carbonate-vitamin D3; Take 1 tablet by mouth 2 times a day.   pseudoephedrine 60 mg tablet; Commonly known as: Sudogest; Take 1 tablet   (60 mg) by mouth every 8 hours if needed for congestion for up to 10 days.   sodium chloride 0.9% parenteral solution 50 mL with DAPTOmycin 50 mg/mL   recon soln 435 mg; Infuse 435 mg at 117.4 mL/hr over 30 minutes into a   venous catheter once every 24 hours.  * This list has 2 medication(s) that are the same as other medications   prescribed for you. Read the directions carefully, and ask your doctor or   other care provider to review them with you.     STOP taking these medications     alteplase 2 mg injection; Commonly known as: Cathflo Activase     ASK your doctor about these medications     * alteplase 2 mg injection; Commonly known as: Cathflo Activase; 2 mL (2   mg) by intra-catheter route 1 time for 1 dose.; Ask about: Should I take   this medication?   * alteplase 2 mg injection; Commonly known as: Cathflo Activase; 2 mL (2   mg) by intra-catheter route 1 time for 1 dose.; Ask about: Should I take   this medication?   lidocaine 4 % patch; Place 2 patches over 12 hours on the skin once   daily for 7 days. Remove & discard patch within 12 hours or as directed by   MD.; Ask about: Should I take this medication?  * This list has 2 medication(s) that are the same as other medications   prescribed for you. Read the directions carefully, and ask your doctor or   other care provider to review them with you.       Outpatient Follow-Up  Future Appointments   Date Time Provider Department [place]   5/27/2025  1:20 PM Melissa Stoll MD [id number] Academic   5/28/2025  2:00 PM RAAD Cannon-CNP [id number] Academic   6/9/2025 12:00 PM Pillo Tello MD [id number] Academic   6/17/2025 10:00 AM Jb Altman MD [id number] Academic   8/12/2025 11:00 AM Dasia MANNING MD [id number]  Academic       I spent > 30 minutes in the discharge of this patient     Discharge discussed with attending physician, Dr. Soheila Stoll PA-C

## 2025-05-26 NOTE — HH CARE COORDINATION
Home Care received a Referral to Resume Care for Infusion, Nursing, and Home Health Aide. We have processed the referral for a Resumption of Care on 5/27.     If you have any questions or concerns, please feel free to contact us at [phone]. Follow the prompts, enter your five digit zip code, and you will be directed to your care team on CENTL 1.

## 2025-05-26 NOTE — CARE PLAN
The patient's goals for the shift include      The clinical goals for the shift include Patient will rate pain as <4/10 through discharge    Over the shift, the patient did make progress toward the following goals. Barriers to progression include improved pain. Recommendations to address these barriers include continue with current regimen.   Patient discharge instructions reviewed.  All questions answered.  Follow up appointments reviewed.  Patient transported to discharge lounge for [employer] .

## 2025-05-26 NOTE — PROGRESS NOTES
PATIENT BEING DISCHARGED TODAY AND WILL BE RECEIVING THE SAME IV ERTAPENEM AND DAPTO AS PRIOR TO ADMISSION [facility] 6/20.  DR. BELLE STILL FOLLOWING.  PATIENT WILL NEED A DAPTO DOSE FOR 5/27, HIS ERTAPENEM IN THE HOME IS STILL IN DATE.  TRIED CALLING PATIENT FOR AN INVENTORY OF THE ERTAPENEM BUT NO ANSWER.  WILL HAVE TO F/U PRIOR TO NEXT DELIVERY AND ADJUST ERTA QTY.  (HE SHOULD HAVE AT LEAST 6 DOSES IN THE HOME ACCORDING TO OUR LAST DELIVERY DATE AND HIS READMIT DATE)  L/M FOR PATIENT THAT WE WOULD DELIVER ON TUESDAY BY 1PM.  PROCESSED FILL FOR 5 DAPTOMYCIN FOR MIX AND DEL. TUESDAY.  DOS 5/27 THRU 5/31.  NF FOR 5/30 AND [facility] TO CHECK INVENTORY OF ERTA AND ADJUST AS NEEDED.  DSL

## 2025-05-27 ENCOUNTER — HOME CARE VISIT (OUTPATIENT)
Dept: HOME HEALTH SERVICES | Facility: HOME HEALTH | Age: 25
End: 2025-05-27
Payer: COMMERCIAL

## 2025-05-27 VITALS
WEIGHT: 143.3 LBS | OXYGEN SATURATION: 95 % | RESPIRATION RATE: 18 BRPM | SYSTOLIC BLOOD PRESSURE: 118 MMHG | BODY MASS INDEX: 18.39 KG/M2 | HEART RATE: 95 BPM | TEMPERATURE: 98.5 F | HEIGHT: 74 IN | DIASTOLIC BLOOD PRESSURE: 60 MMHG

## 2025-05-27 LAB
FUNGUS SPEC CULT: NORMAL
FUNGUS SPEC FUNGUS STN: NORMAL

## 2025-05-27 PROCEDURE — G0299 HHS/HOSPICE OF RN EA 15 MIN: HCPCS

## 2025-05-27 RX ADMIN — Medication 10 ML: at 14:40

## 2025-05-27 RX ADMIN — Medication 5 ML: at 14:41

## 2025-05-27 RX ADMIN — Medication 10 ML: at 13:55

## 2025-05-27 RX ADMIN — ERTAPENEM 1 G: 1 INJECTION, POWDER, LYOPHILIZED, FOR SOLUTION INTRAMUSCULAR; INTRAVENOUS at 14:01

## 2025-05-27 RX ADMIN — Medication 10 ML: at 14:00

## 2025-05-28 SDOH — ECONOMIC STABILITY: HOUSING INSECURITY: EVIDENCE OF SMOKING MATERIAL: 0

## 2025-05-28 SDOH — HEALTH STABILITY: MENTAL HEALTH: SMOKING IN HOME: 0

## 2025-05-28 ASSESSMENT — ENCOUNTER SYMPTOMS
LAST BOWEL MOVEMENT: 67352
CHANGE IN APPETITE: VARYING
MUSCLE WEAKNESS: 1
DENIES PAIN: 1
APPETITE LEVEL: FAIR
FATIGUES EASILY: 1
PERSON REPORTING PAIN: PATIENT

## 2025-05-28 ASSESSMENT — ACTIVITIES OF DAILY LIVING (ADL)
OASIS_M1830: 03
ENTERING_EXITING_HOME: NEEDS ASSISTANCE

## 2025-05-30 ENCOUNTER — HOME INFUSION (OUTPATIENT)
Dept: INFUSION THERAPY | Age: 25
End: 2025-05-30
Payer: COMMERCIAL

## 2025-05-30 DIAGNOSIS — M86.9 OSTEOMYELITIS, UNSPECIFIED SITE, UNSPECIFIED TYPE (MULTI): ICD-10-CM

## 2025-05-30 NOTE — PROGRESS NOTES
Joellen Narayan is receiving dapto and erta for septic arthritis thru 6/20     Followed by Dr Altman    Labs from 5/25 show WBC 11.5     RX contacted pt, confirmed doses in home thru Sat 5/31. He is agreeable to delivery today for another 6 days of meds and supplies. No questions for Rph at this time.    Dispensing 5/30 with supplies to match   6x erta MB+  6x dapto syringe  DOS 6/1-6/6    Follow up 6/6 check labs, send straight

## 2025-06-02 LAB
LABORATORY COMMENT REPORT: NORMAL
PATH REPORT.FINAL DX SPEC: NORMAL
PATH REPORT.GROSS SPEC: NORMAL
PATH REPORT.RELEVANT HX SPEC: NORMAL
PATH REPORT.TOTAL CANCER: NORMAL

## 2025-06-02 NOTE — DOCUMENTATION CLARIFICATION NOTE
"    PATIENT:               AMARIS BLANCO  ACCT #:                  [id number]  MRN:                       [id number]  :                       2000  ADMIT DATE:       2025 2:26 PM  DISCH DATE:        2025 2:43 PM  RESPONDING PROVIDER #:        [id number]          PROVIDER RESPONSE TEXT:    Hodgkin's lymphoma is likely present and clinically significant requiring treatment/monitoring    CDI QUERY TEXT:    Clarification    Instruction:    Based on your assessment of the patient and the clinical information, please provide the requested documentation by clicking on the appropriate radio button and enter any additional information if prompted.    Question: Is there a diagnosis indicative of the image study    When answering this query, please exercise your independent professional judgment. The fact that a question is being asked, does not imply that any particular answer is desired or expected.    The patient's clinical indicators include:  Clinical Information: 24 y.o. male with PMH nodular lymphocyte predominant Hodgkin's lymphoma (s/p rituxan/prednisone, not on current active chemo) admitted for sickle cell crisis.    Clinical Indicators:  -Progress notes state \" PET showed interval development of new FDG focus in mid abdomen.\"    Treatment:  -Outpatient appointment scheduled     Risk Factors: NHL, sickle cell, smoker  Options provided:  -- Hodgkin's lymphoma is likely present and clinically significant requiring treatment/monitoring  -- Hodgkin's lymphoma is not likely present nor requiring treatment or evaluation  -- Other - I will add my own diagnosis  -- Refer to Clinical Documentation Reviewer    Query created by: Lesly Jane on 2025 11:40 AM      Electronically signed by:  DAE LOPEZ PA-C 2025 7:00 PM          "

## 2025-06-04 ENCOUNTER — CLINICAL SUPPORT (OUTPATIENT)
Dept: EMERGENCY MEDICINE | Facility: HOSPITAL | Age: 25
End: 2025-06-04
Payer: COMMERCIAL

## 2025-06-04 ENCOUNTER — HOME CARE VISIT (OUTPATIENT)
Dept: HOME HEALTH SERVICES | Facility: HOME HEALTH | Age: 25
End: 2025-06-04
Payer: COMMERCIAL

## 2025-06-04 ENCOUNTER — APPOINTMENT (OUTPATIENT)
Dept: RADIOLOGY | Facility: HOSPITAL | Age: 25
End: 2025-06-04
Payer: COMMERCIAL

## 2025-06-04 ENCOUNTER — HOSPITAL ENCOUNTER (EMERGENCY)
Facility: HOSPITAL | Age: 25
Discharge: HOME | End: 2025-06-05
Attending: EMERGENCY MEDICINE
Payer: COMMERCIAL

## 2025-06-04 VITALS
DIASTOLIC BLOOD PRESSURE: 65 MMHG | OXYGEN SATURATION: 94 % | HEART RATE: 88 BPM | SYSTOLIC BLOOD PRESSURE: 112 MMHG | TEMPERATURE: 98 F

## 2025-06-04 DIAGNOSIS — D57.00 SICKLE CELL PAIN CRISIS (MULTI): Primary | ICD-10-CM

## 2025-06-04 LAB
ALBUMIN SERPL BCP-MCNC: 4.2 G/DL (ref 3.4–5)
ALP SERPL-CCNC: 141 U/L (ref 33–120)
ALT SERPL W P-5'-P-CCNC: 27 U/L (ref 10–52)
ANION GAP SERPL CALC-SCNC: 12 MMOL/L (ref 10–20)
AST SERPL W P-5'-P-CCNC: 43 U/L (ref 9–39)
BASOPHILS # BLD AUTO: 0.12 X10*3/UL (ref 0–0.1)
BASOPHILS NFR BLD AUTO: 0.6 %
BILIRUB SERPL-MCNC: 6.8 MG/DL (ref 0–1.2)
BUN SERPL-MCNC: 6 MG/DL (ref 6–23)
CALCIUM SERPL-MCNC: 9.1 MG/DL (ref 8.6–10.6)
CARDIAC TROPONIN I PNL SERPL HS: 5 NG/L (ref 0–53)
CHLORIDE SERPL-SCNC: 103 MMOL/L (ref 98–107)
CO2 SERPL-SCNC: 25 MMOL/L (ref 21–32)
CREAT SERPL-MCNC: 0.77 MG/DL (ref 0.5–1.3)
EGFRCR SERPLBLD CKD-EPI 2021: >90 ML/MIN/1.73M*2
EOSINOPHIL # BLD AUTO: 0.89 X10*3/UL (ref 0–0.7)
EOSINOPHIL NFR BLD AUTO: 4.7 %
ERYTHROCYTE [DISTWIDTH] IN BLOOD BY AUTOMATED COUNT: 21.2 % (ref 11.5–14.5)
GLUCOSE SERPL-MCNC: 88 MG/DL (ref 74–99)
HCT VFR BLD AUTO: 23.5 % (ref 41–52)
HGB BLD-MCNC: 8.2 G/DL (ref 13.5–17.5)
HGB RETIC QN: 32 PG (ref 28–38)
IMM GRANULOCYTES # BLD AUTO: 0.76 X10*3/UL (ref 0–0.7)
IMM GRANULOCYTES NFR BLD AUTO: 4 % (ref 0–0.9)
IMMATURE RETIC FRACTION: 30.5 %
LDH SERPL L TO P-CCNC: 374 U/L (ref 84–246)
LYMPHOCYTES # BLD AUTO: 4.18 X10*3/UL (ref 1.2–4.8)
LYMPHOCYTES NFR BLD AUTO: 22 %
MCH RBC QN AUTO: 30.7 PG (ref 26–34)
MCHC RBC AUTO-ENTMCNC: 34.9 G/DL (ref 32–36)
MCV RBC AUTO: 88 FL (ref 80–100)
MONOCYTES # BLD AUTO: 1.9 X10*3/UL (ref 0.1–1)
MONOCYTES NFR BLD AUTO: 10 %
NEUTROPHILS # BLD AUTO: 11.16 X10*3/UL (ref 1.2–7.7)
NEUTROPHILS NFR BLD AUTO: 58.7 %
NRBC BLD-RTO: 3.6 /100 WBCS (ref 0–0)
PLATELET # BLD AUTO: 250 X10*3/UL (ref 150–450)
POLYCHROMASIA BLD QL SMEAR: NORMAL
POTASSIUM SERPL-SCNC: 3.8 MMOL/L (ref 3.5–5.3)
PROT SERPL-MCNC: 6.7 G/DL (ref 6.4–8.2)
RBC # BLD AUTO: 2.67 X10*6/UL (ref 4.5–5.9)
RBC MORPH BLD: NORMAL
RETICS #: 0.63 X10*6/UL (ref 0.02–0.12)
RETICS/RBC NFR AUTO: 23.5 % (ref 0.5–2)
SICKLE CELLS BLD QL SMEAR: NORMAL
SODIUM SERPL-SCNC: 136 MMOL/L (ref 136–145)
TARGETS BLD QL SMEAR: NORMAL
WBC # BLD AUTO: 19 X10*3/UL (ref 4.4–11.3)

## 2025-06-04 PROCEDURE — 82550 ASSAY OF CK (CPK): CPT

## 2025-06-04 PROCEDURE — 85045 AUTOMATED RETICULOCYTE COUNT: CPT

## 2025-06-04 PROCEDURE — 84484 ASSAY OF TROPONIN QUANT: CPT

## 2025-06-04 PROCEDURE — 96376 TX/PRO/DX INJ SAME DRUG ADON: CPT

## 2025-06-04 PROCEDURE — 85025 COMPLETE CBC W/AUTO DIFF WBC: CPT

## 2025-06-04 PROCEDURE — 80053 COMPREHEN METABOLIC PANEL: CPT

## 2025-06-04 PROCEDURE — 2500000004 HC RX 250 GENERAL PHARMACY W/ HCPCS (ALT 636 FOR OP/ED)

## 2025-06-04 PROCEDURE — 71046 X-RAY EXAM CHEST 2 VIEWS: CPT | Performed by: RADIOLOGY

## 2025-06-04 PROCEDURE — 99285 EMERGENCY DEPT VISIT HI MDM: CPT | Mod: 25 | Performed by: EMERGENCY MEDICINE

## 2025-06-04 PROCEDURE — 99285 EMERGENCY DEPT VISIT HI MDM: CPT | Performed by: EMERGENCY MEDICINE

## 2025-06-04 PROCEDURE — 93010 ELECTROCARDIOGRAM REPORT: CPT | Performed by: EMERGENCY MEDICINE

## 2025-06-04 PROCEDURE — 96374 THER/PROPH/DIAG INJ IV PUSH: CPT

## 2025-06-04 PROCEDURE — 96375 TX/PRO/DX INJ NEW DRUG ADDON: CPT

## 2025-06-04 PROCEDURE — G0299 HHS/HOSPICE OF RN EA 15 MIN: HCPCS

## 2025-06-04 PROCEDURE — 93005 ELECTROCARDIOGRAM TRACING: CPT

## 2025-06-04 PROCEDURE — 83615 LACTATE (LD) (LDH) ENZYME: CPT

## 2025-06-04 PROCEDURE — 71046 X-RAY EXAM CHEST 2 VIEWS: CPT

## 2025-06-04 PROCEDURE — 82248 BILIRUBIN DIRECT: CPT

## 2025-06-04 PROCEDURE — 36415 COLL VENOUS BLD VENIPUNCTURE: CPT

## 2025-06-04 RX ORDER — METHOCARBAMOL 100 MG/ML
1000 INJECTION, SOLUTION INTRAMUSCULAR; INTRAVENOUS ONCE
Status: COMPLETED | OUTPATIENT
Start: 2025-06-04 | End: 2025-06-04

## 2025-06-04 RX ORDER — HYDROMORPHONE HYDROCHLORIDE 1 MG/ML
1 INJECTION, SOLUTION INTRAMUSCULAR; INTRAVENOUS; SUBCUTANEOUS
Status: COMPLETED | OUTPATIENT
Start: 2025-06-04 | End: 2025-06-05

## 2025-06-04 RX ORDER — ONDANSETRON HYDROCHLORIDE 2 MG/ML
4 INJECTION, SOLUTION INTRAVENOUS ONCE
Status: COMPLETED | OUTPATIENT
Start: 2025-06-04 | End: 2025-06-04

## 2025-06-04 RX ADMIN — HYDROMORPHONE HYDROCHLORIDE 1 MG: 1 INJECTION, SOLUTION INTRAMUSCULAR; INTRAVENOUS; SUBCUTANEOUS at 22:03

## 2025-06-04 RX ADMIN — METHOCARBAMOL 1000 MG: 1000 INJECTION, SOLUTION INTRAMUSCULAR; INTRAVENOUS at 22:03

## 2025-06-04 RX ADMIN — Medication 5 ML: at 12:19

## 2025-06-04 RX ADMIN — ONDANSETRON 4 MG: 2 INJECTION INTRAMUSCULAR; INTRAVENOUS at 22:03

## 2025-06-04 RX ADMIN — HYDROMORPHONE HYDROCHLORIDE 1 MG: 1 INJECTION, SOLUTION INTRAMUSCULAR; INTRAVENOUS; SUBCUTANEOUS at 23:44

## 2025-06-04 RX ADMIN — Medication 30 ML: at 12:18

## 2025-06-04 SDOH — HEALTH STABILITY: MENTAL HEALTH: SMOKING IN HOME: 0

## 2025-06-04 SDOH — ECONOMIC STABILITY: HOUSING INSECURITY: EVIDENCE OF SMOKING MATERIAL: 0

## 2025-06-04 ASSESSMENT — PAIN - FUNCTIONAL ASSESSMENT: PAIN_FUNCTIONAL_ASSESSMENT: 0-10

## 2025-06-04 ASSESSMENT — PAIN DESCRIPTION - DESCRIPTORS: DESCRIPTORS: ACHING

## 2025-06-04 ASSESSMENT — PAIN SCALES - GENERAL
PAINLEVEL_OUTOF10: 10 - WORST POSSIBLE PAIN
PAINLEVEL_OUTOF10: 10 - WORST POSSIBLE PAIN

## 2025-06-04 ASSESSMENT — ENCOUNTER SYMPTOMS
DENIES PAIN: 1
PERSON REPORTING PAIN: PATIENT
APPETITE LEVEL: GOOD
LAST BOWEL MOVEMENT: 67360

## 2025-06-04 ASSESSMENT — PAIN DESCRIPTION - LOCATION: LOCATION: GENERALIZED

## 2025-06-04 ASSESSMENT — PAIN DESCRIPTION - PAIN TYPE: TYPE: ACUTE PAIN

## 2025-06-05 ENCOUNTER — LAB REQUISITION (OUTPATIENT)
Dept: LAB | Facility: HOSPITAL | Age: 25
End: 2025-06-05
Payer: COMMERCIAL

## 2025-06-05 VITALS
OXYGEN SATURATION: 95 % | DIASTOLIC BLOOD PRESSURE: 80 MMHG | RESPIRATION RATE: 14 BRPM | TEMPERATURE: 99.5 F | BODY MASS INDEX: 18.35 KG/M2 | HEART RATE: 82 BPM | SYSTOLIC BLOOD PRESSURE: 150 MMHG | WEIGHT: 143 LBS | HEIGHT: 74 IN

## 2025-06-05 DIAGNOSIS — M86.9 OSTEOMYELITIS, UNSPECIFIED: ICD-10-CM

## 2025-06-05 LAB
ALBUMIN SERPL BCP-MCNC: 4.5 G/DL (ref 3.4–5)
ALP SERPL-CCNC: 154 U/L (ref 33–120)
ALT SERPL W P-5'-P-CCNC: 31 U/L (ref 10–52)
ANION GAP SERPL CALC-SCNC: 16 MMOL/L (ref 10–20)
AST SERPL W P-5'-P-CCNC: 45 U/L (ref 9–39)
ATRIAL RATE: 91 BPM
BASOPHILS # BLD AUTO: 0.07 X10*3/UL (ref 0–0.1)
BASOPHILS NFR BLD AUTO: 0.6 %
BILIRUB DIRECT SERPL-MCNC: 1.6 MG/DL (ref 0–0.3)
BILIRUB SERPL-MCNC: 6.6 MG/DL (ref 0–1.2)
BUN SERPL-MCNC: 5 MG/DL (ref 6–23)
CALCIUM SERPL-MCNC: 9.8 MG/DL (ref 8.6–10.6)
CARDIAC TROPONIN I PNL SERPL HS: 5 NG/L (ref 0–53)
CHLORIDE SERPL-SCNC: 104 MMOL/L (ref 98–107)
CK SERPL-CCNC: 24 U/L (ref 0–325)
CO2 SERPL-SCNC: 23 MMOL/L (ref 21–32)
CREAT SERPL-MCNC: 0.57 MG/DL (ref 0.5–1.3)
EGFRCR SERPLBLD CKD-EPI 2021: >90 ML/MIN/1.73M*2
EOSINOPHIL # BLD AUTO: 0.5 X10*3/UL (ref 0–0.7)
EOSINOPHIL NFR BLD AUTO: 4.3 %
ERYTHROCYTE [DISTWIDTH] IN BLOOD BY AUTOMATED COUNT: 21.6 % (ref 11.5–14.5)
GLUCOSE SERPL-MCNC: 88 MG/DL (ref 74–99)
HCT VFR BLD AUTO: 27.7 % (ref 41–52)
HGB BLD-MCNC: 9.1 G/DL (ref 13.5–17.5)
HOLD SPECIMEN: NORMAL
IMM GRANULOCYTES # BLD AUTO: 0.11 X10*3/UL (ref 0–0.7)
IMM GRANULOCYTES NFR BLD AUTO: 0.9 % (ref 0–0.9)
LYMPHOCYTES # BLD AUTO: 1.83 X10*3/UL (ref 1.2–4.8)
LYMPHOCYTES NFR BLD AUTO: 15.7 %
MCH RBC QN AUTO: 30.4 PG (ref 26–34)
MCHC RBC AUTO-ENTMCNC: 32.9 G/DL (ref 32–36)
MCV RBC AUTO: 93 FL (ref 80–100)
MONOCYTES # BLD AUTO: 1.06 X10*3/UL (ref 0.1–1)
MONOCYTES NFR BLD AUTO: 9.1 %
NEUTROPHILS # BLD AUTO: 8.12 X10*3/UL (ref 1.2–7.7)
NEUTROPHILS NFR BLD AUTO: 69.4 %
NRBC BLD-RTO: 3.3 /100 WBCS (ref 0–0)
P AXIS: 59 DEGREES
P OFFSET: 180 MS
P ONSET: 130 MS
PLATELET # BLD AUTO: 254 X10*3/UL (ref 150–450)
POTASSIUM SERPL-SCNC: 4.4 MMOL/L (ref 3.5–5.3)
PR INTERVAL: 172 MS
PROT SERPL-MCNC: 7.1 G/DL (ref 6.4–8.2)
Q ONSET: 216 MS
QRS COUNT: 15 BEATS
QRS DURATION: 102 MS
QT INTERVAL: 346 MS
QTC CALCULATION(BAZETT): 425 MS
QTC FREDERICIA: 397 MS
R AXIS: 86 DEGREES
RBC # BLD AUTO: 2.99 X10*6/UL (ref 4.5–5.9)
SODIUM SERPL-SCNC: 139 MMOL/L (ref 136–145)
T AXIS: 16 DEGREES
T OFFSET: 389 MS
VENTRICULAR RATE: 91 BPM
WBC # BLD AUTO: 11.7 X10*3/UL (ref 4.4–11.3)

## 2025-06-05 PROCEDURE — 2500000004 HC RX 250 GENERAL PHARMACY W/ HCPCS (ALT 636 FOR OP/ED): Mod: JZ,TB

## 2025-06-05 PROCEDURE — 96376 TX/PRO/DX INJ SAME DRUG ADON: CPT

## 2025-06-05 RX ADMIN — HYDROMORPHONE HYDROCHLORIDE 1 MG: 1 INJECTION, SOLUTION INTRAMUSCULAR; INTRAVENOUS; SUBCUTANEOUS at 01:12
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
## 2025-06-05 NOTE — DISCHARGE INSTRUCTIONS
You presented to the ED for pain typical of your sickle cell pain crisis.  Follow-up with your primary provider within the next 2 to 3 days.  Please return to the emergency department for any new or worsening symptoms including but not limited to concerning pain, difficulty breathing, and any infectious symptoms.  The phone number for the primary care clinic at [facility] is [phone].

## 2025-06-05 NOTE — ED TRIAGE NOTES
Pt presents to the ED d/t SCC pain. Pt states the pain is in his chest, back, and stomach. Pt states his pain is a 10/10 at this time. Pt is AxOx4. Pt has a hx of non-Hodgkin's lymphoma but is not actively getting treatment.

## 2025-06-05 NOTE — PROGRESS NOTES
Emergency Medicine Transition of Care Note.    I received Joellen Narayan in signout from Dr. Ricketts.  Please see the previous ED provider note for all HPI, PE and MDM up to the time of signout at 2300. This is in addition to the primary record.    In brief Joellen Narayan is an 24 y.o. male with a past medical history of nodular lymphocyte predominant Hodgkin's lymphoma (an LP HL) S/P right to command/prednisone, not on current active chemo, Hb SS sickle cell disease, priapism, acute chest syndrome, and nocturnal hypoxia (baseline 2-3 L at night) and septic arthritis who presents to the emergency department for sickle cell pain crisis.  Patient administered analgesia.  Given that patient noted chest pain, cardiac workup was initiated as well as hemolysis labs.  CXR without findings concerning for acute chest.    Chief Complaint   Patient presents with    Sickle Cell Pain Crisis     At the time of signout we were awaiting: Labs and reevaluation    ED Course as of 06/07/25 2051 Wed Jun 04, 2025 2125 ECG 12 lead  ECG interpreted by me.  Performed June 4, 2025 at 2041.  Sinus rhythm.  91 bpm.  , , QTc 425.  No ST elevation. [MC]   224 History of sickle cell.  Patient reported generalized chest pain, back pain, abdominal pain that he stated were consistent with previous sickle cell pain crisis in terms of location and quality.  Pain started around 330 this morning and did not respond to Tylenol and oxycodone which prompted him to seek medical attention.  No associated fever, shortness of breath, sputum production, hemoptysis, nausea or vomiting, joint swelling, extremity weakness or numbness, extremity swelling.  Reassuring vital signs.  Patient appeared nontoxic with normal air entry and no conversational dyspnea.  Abdomen soft and nontender and nondistended.  No extremity edema or joint swelling.  ECG did not suggest acute coronary syndrome and initial troponin pending.  Chest x-ray without  infiltrate or consolidation so lower suspicion for acute chest syndrome.  Dilaudid and Robaxin ordered for pain and plan to reassess. []   Thu Jun 05, 2025   0104 Noted to be on 2 L O2 while sleeping.  Patient satting high 90s on walking pulse ox without oxygen.  Patient notes that he will feel ready to go home after third dose of Dilaudid. []   0107 Hemolysis labs elevated compared to previous.  Tropes normal.  []   0108 Metabolic panel unremarkable.  CBC with leukocytosis and chronic anemia.  Patient not noting any infectious symptoms.  Leukocytosis likely reactive. []      ED Course User Index  [] Derek Galvez MD  [] Ramon Porras MD         Diagnoses as of 06/07/25 2051   Sickle cell pain crisis (Multi)       Medical Decision Making  Upon evaluation, patient noted feeling significantly better.  Discussed ED findings, plan for outpatient primary follow-up within the next week, and strict return precautions for any new or worsening symptoms.  Patient stated understanding and agreement the plan.  All questions were answered.  Patient discharged in stable condition.    Final diagnoses:   [D57.00] Sickle cell pain crisis (Multi)           Procedure  Procedures    Patient presentation and plan discussed with attending physician.    Ramon Porras MD

## 2025-06-06 ENCOUNTER — HOME INFUSION (OUTPATIENT)
Dept: INFUSION THERAPY | Age: 25
End: 2025-06-06
Payer: COMMERCIAL

## 2025-06-06 DIAGNOSIS — M86.9 OSTEOMYELITIS, UNSPECIFIED SITE, UNSPECIFIED TYPE (MULTI): ICD-10-CM

## 2025-06-06 NOTE — PROGRESS NOTES
Joellen Narayan is receiving dapto and erta for septic arthritis thru 6/20     Followed by Dr Altman     Labs from 6/4 reviewed, WBC still elevated at 11.7. liver enzymes improving    Tel call to patient, no answer, no voicemail.     Dispensing 6/6 with supplies to match   7x erta MB+  7x dapto syringe  DOS 6/7-6/13     Follow up 6/13 check labs, send straight remainder

## 2025-06-09 ENCOUNTER — HOSPITAL ENCOUNTER (OUTPATIENT)
Dept: RADIOLOGY | Facility: HOSPITAL | Age: 25
Discharge: HOME | End: 2025-06-09
Payer: COMMERCIAL

## 2025-06-09 ENCOUNTER — OFFICE VISIT (OUTPATIENT)
Dept: ORTHOPEDIC SURGERY | Facility: HOSPITAL | Age: 25
End: 2025-06-09
Payer: COMMERCIAL

## 2025-06-09 DIAGNOSIS — M86.9 OSTEOMYELITIS, UNSPECIFIED SITE, UNSPECIFIED TYPE (MULTI): ICD-10-CM

## 2025-06-09 DIAGNOSIS — M71.121 SEPTIC BURSITIS OF ELBOW, RIGHT: ICD-10-CM

## 2025-06-09 PROCEDURE — 99024 POSTOP FOLLOW-UP VISIT: CPT | Performed by: ORTHOPAEDIC SURGERY

## 2025-06-09 PROCEDURE — 73070 X-RAY EXAM OF ELBOW: CPT | Mod: RT

## 2025-06-09 PROCEDURE — 73070 X-RAY EXAM OF ELBOW: CPT | Mod: RIGHT SIDE | Performed by: RADIOLOGY

## 2025-06-09 NOTE — PROGRESS NOTES
Patient is 5 weeks follow-up from open debridement of right elbow joint for infection, intramedullary reaming of humerus with debridement intramedullary reaming of ulna with debridement.    All cultures were negative but the patient was on presurgery antibiotics.  The biopsy from the elbow joint is consistent with septic arthritis per pathology.  Patient is on IV antibiotics.  Patient also has a history of sickle cell disease and a remote history of non-Hodgkin's lymphoma with treatment and chemotherapy in the past.  He is doing very well.  His elbow is nonpainful.  He is not not complaining.  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    His sutures are well maintained on the right elbow.  His incision is completely clean and dry.  He has no tenderness of his arm or his forearm.  There is no sign of infection.  His elbow range of motion is quite good.  He has about 120 degrees of elbow flexion and extends to 10 degrees.  He has good supination and pronation.  He has good  strength in his hand.  Pulses are intact.    X-rays of the elbow reveal elbow joint to be well-maintained.  Patient does have an oval hole just above the olecranon fossa in the humerus which is my unicortical osteotomy for access to the medullary canal.  This is not infection it is not a tumor..  I made the small hole to access the medullary canal of the humerus for the reaming at the time of surgery.    Assessment status post debridement of right elbow, right humerus intramedullary canal, right ulna canal.  All cultures negative but biopsy of elbow synovium suggested suggestive of sepsis.  Currently on IV antibiotics.    Plan discontinue sutures today, continue to work on range of motion with independent program, patient really does not want to go to physical therapy.  Okay to drive.  Anticipate return to work in July.  Follow-up with me in 6 to 8 weeks x-rays of right  elbow at that time.

## 2025-06-10 NOTE — LETTER
Date: 2024  RE:  Joellen Narayan  :  2000      To Whom It May Concern:    Our patient, Joellen, has been under our care from 24-24 and now may return back to work without restrictions on 10/4/24    Their return to work date is:  10/4/2024    If you have questions concerning this patient's immediate care, please feel free to contact our office at [phone]    Sincerely,      Marie Modi, RAAD-CNP     Has not started Wegovy, but noted adherence to all other medications. Noted her shortness of breath has not gotten any better or worse. Worried about having side effects from Wegovy. She knows to call me to schedule an earlier appointment to assess for side effects if she starts Wegovy.

## 2025-06-11 ENCOUNTER — HOME CARE VISIT (OUTPATIENT)
Dept: HOME HEALTH SERVICES | Facility: HOME HEALTH | Age: 25
End: 2025-06-11
Payer: COMMERCIAL

## 2025-06-11 ENCOUNTER — LAB (OUTPATIENT)
Dept: LAB | Facility: HOSPITAL | Age: 25
End: 2025-06-11
Payer: COMMERCIAL

## 2025-06-11 VITALS
HEART RATE: 70 BPM | SYSTOLIC BLOOD PRESSURE: 131 MMHG | DIASTOLIC BLOOD PRESSURE: 67 MMHG | TEMPERATURE: 97.3 F | OXYGEN SATURATION: 93 %

## 2025-06-11 LAB
ALBUMIN SERPL BCP-MCNC: 4.3 G/DL (ref 3.4–5)
ALP SERPL-CCNC: 124 U/L (ref 33–120)
ALT SERPL W P-5'-P-CCNC: 18 U/L (ref 10–52)
ANION GAP SERPL CALC-SCNC: 12 MMOL/L (ref 10–20)
AST SERPL W P-5'-P-CCNC: 43 U/L (ref 9–39)
BASOPHILS # BLD AUTO: 0.07 X10*3/UL (ref 0–0.1)
BASOPHILS NFR BLD AUTO: 0.8 %
BILIRUB DIRECT SERPL-MCNC: 1.3 MG/DL (ref 0–0.3)
BILIRUB SERPL-MCNC: 7.5 MG/DL (ref 0–1.2)
BUN SERPL-MCNC: 6 MG/DL (ref 6–23)
CALCIUM SERPL-MCNC: 8.9 MG/DL (ref 8.6–10.6)
CHLORIDE SERPL-SCNC: 105 MMOL/L (ref 98–107)
CK SERPL-CCNC: 37 U/L (ref 0–325)
CO2 SERPL-SCNC: 25 MMOL/L (ref 21–32)
CREAT SERPL-MCNC: 0.54 MG/DL (ref 0.5–1.3)
EGFRCR SERPLBLD CKD-EPI 2021: >90 ML/MIN/1.73M*2
EOSINOPHIL # BLD AUTO: 0.48 X10*3/UL (ref 0–0.7)
EOSINOPHIL NFR BLD AUTO: 5.4 %
ERYTHROCYTE [DISTWIDTH] IN BLOOD BY AUTOMATED COUNT: 20.7 % (ref 11.5–14.5)
GLUCOSE SERPL-MCNC: 80 MG/DL (ref 74–99)
HCT VFR BLD AUTO: 25.8 % (ref 41–52)
HGB BLD-MCNC: 8.7 G/DL (ref 13.5–17.5)
IMM GRANULOCYTES # BLD AUTO: 0.05 X10*3/UL (ref 0–0.7)
IMM GRANULOCYTES NFR BLD AUTO: 0.6 % (ref 0–0.9)
LYMPHOCYTES # BLD AUTO: 2.01 X10*3/UL (ref 1.2–4.8)
LYMPHOCYTES NFR BLD AUTO: 22.6 %
MCH RBC QN AUTO: 31.2 PG (ref 26–34)
MCHC RBC AUTO-ENTMCNC: 33.7 G/DL (ref 32–36)
MCV RBC AUTO: 93 FL (ref 80–100)
MONOCYTES # BLD AUTO: 0.82 X10*3/UL (ref 0.1–1)
MONOCYTES NFR BLD AUTO: 9.2 %
NEUTROPHILS # BLD AUTO: 5.48 X10*3/UL (ref 1.2–7.7)
NEUTROPHILS NFR BLD AUTO: 61.4 %
NRBC BLD-RTO: 2.6 /100 WBCS (ref 0–0)
PLATELET # BLD AUTO: 199 X10*3/UL (ref 150–450)
POTASSIUM SERPL-SCNC: 4.3 MMOL/L (ref 3.5–5.3)
PROT SERPL-MCNC: 6.9 G/DL (ref 6.4–8.2)
RBC # BLD AUTO: 2.79 X10*6/UL (ref 4.5–5.9)
SODIUM SERPL-SCNC: 138 MMOL/L (ref 136–145)
WBC # BLD AUTO: 8.9 X10*3/UL (ref 4.4–11.3)

## 2025-06-11 PROCEDURE — 82248 BILIRUBIN DIRECT: CPT

## 2025-06-11 PROCEDURE — 82550 ASSAY OF CK (CPK): CPT

## 2025-06-11 PROCEDURE — 80053 COMPREHEN METABOLIC PANEL: CPT

## 2025-06-11 PROCEDURE — G0299 HHS/HOSPICE OF RN EA 15 MIN: HCPCS

## 2025-06-11 PROCEDURE — 85025 COMPLETE CBC W/AUTO DIFF WBC: CPT

## 2025-06-11 RX ADMIN — Medication 5 ML: at 12:01

## 2025-06-11 RX ADMIN — Medication 30 ML: at 11:45

## 2025-06-11 ASSESSMENT — ENCOUNTER SYMPTOMS
PERSON REPORTING PAIN: PATIENT
LAST BOWEL MOVEMENT: 67367
APPETITE LEVEL: GOOD
DENIES PAIN: 1

## 2025-06-13 ENCOUNTER — HOME INFUSION (OUTPATIENT)
Dept: INFUSION THERAPY | Age: 25
End: 2025-06-13
Payer: COMMERCIAL

## 2025-06-13 DIAGNOSIS — M86.9 OSTEOMYELITIS, UNSPECIFIED SITE, UNSPECIFIED TYPE (MULTI): ICD-10-CM

## 2025-06-13 NOTE — PROGRESS NOTES
Chart review - Joellen Narayan is a 24 y.o. patient receiving home care for septic arthritis.  Med - daptomycin 435 mg q24 and ertapenem 1 gm q24 thru 6/20. Dr. Altman following    Labs from 6/11 reviewed- unremarkable  RN visits reviewed- no issues noted with infusions or line    Rph call to pt, tolerating infusions well, confirmed doses in home thru 6/13. Agreeable to delivery of remainder of medication and supplies.     Rph offered to  - pt stated no questions at this time.    Pharmacy to mix 6/13 and deliver straight with supplies to match:  7x daptomycin 435 mg syringes  7x ertapenem 1 gm MB+  DOS: 6/14 - 6/20    Follow up 6/20 - POC Dr. Altman

## 2025-06-17 ENCOUNTER — APPOINTMENT (OUTPATIENT)
Dept: INFECTIOUS DISEASES | Facility: CLINIC | Age: 25
End: 2025-06-17
Payer: COMMERCIAL

## 2025-06-18 ENCOUNTER — HOME CARE VISIT (OUTPATIENT)
Dept: HOME HEALTH SERVICES | Facility: HOME HEALTH | Age: 25
End: 2025-06-18
Payer: COMMERCIAL

## 2025-06-18 ENCOUNTER — DOCUMENTATION (OUTPATIENT)
Dept: INTERNAL MEDICINE | Facility: HOSPITAL | Age: 25
End: 2025-06-18
Payer: COMMERCIAL

## 2025-06-18 NOTE — PROGRESS NOTES
Mr. Narayan missed our appointment yesterday 6/17. Yesterday 6/17 I called him (unable to reach due to his phone settings/restrictions), and then I called his next-of-kin Ms. Melissa Narayan to ask her to pass on my contact information to talk about stopping antibiotic therapy. Provided my callback.    Assessment/Plan  Joellen Narayan is a 24 y.o. 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     Found with culture-negative RUE elbow septic arthritis with possible humeral osteomyelitis versus another process (AVN might be on the differential (the description of the humeral findings - extending from distal epiphysis to the proximal diaphysis without T1 enhancement - seems a bit unusual for osteo)). OR 5/10: serosanguinous fluid in the elbow joint that was not frankly purulent; 2 cultures obtained with debridement and synovial biopsy (NG); humerus debrided with culture/biopsies obtained with pathology: acute synovitis c/w septic arthritis in R elbow, fragments of devitalized bone/marrow necrosis/intramedullary reactive changes in R humerus. Discharged on IV daptomycin and ertapenem x 6 weeks (in case of osteomyelitis component) through 6/20/25.    UPDATE 6/18:   Ms. Narayan returned my call later this AM. Mr. Narayan is doing well without fevers, chills, swelling, redness, drainage, or PICC line issues. He has had some nausea which he attributes to antibiotic infusions. He saw Orthopedics on 6/9 and was doing well overall.    Plan  -Complete IV daptomycin and  ertapenem as planned on 6/20  -Ok to remove PICC line afterwards  -Ms. Narayan noted Mr. Narayan phone has had difficulties/is off. Please contact Ms. Sylvain's cell phone ([phone]) if need to be in contact with Mr. Narayan for coordination etc.

## 2025-06-19 ENCOUNTER — HOME INFUSION (OUTPATIENT)
Dept: INFUSION THERAPY | Age: 25
End: 2025-06-19
Payer: COMMERCIAL

## 2025-06-19 DIAGNOSIS — M71.121 SEPTIC BURSITIS OF ELBOW, RIGHT: Primary | ICD-10-CM

## 2025-06-19 NOTE — PROGRESS NOTES
Received orders from Dr. Altman via staff message  Stop antibiotic as ordered after dose on 6/20/25. HC RN to remove line. Discharge from RX services    Order entered into Epic. Gold copy to intake. Secure Chat RN as FYI    Pt care rep to discharge pt from [facility] and discharge chart

## 2025-06-20 ENCOUNTER — HOSPITAL ENCOUNTER (EMERGENCY)
Facility: HOSPITAL | Age: 25
Discharge: HOME | End: 2025-06-20
Attending: EMERGENCY MEDICINE
Payer: COMMERCIAL

## 2025-06-20 ENCOUNTER — HOME CARE VISIT (OUTPATIENT)
Dept: HOME HEALTH SERVICES | Facility: HOME HEALTH | Age: 25
End: 2025-06-20
Payer: COMMERCIAL

## 2025-06-20 VITALS
OXYGEN SATURATION: 97 % | SYSTOLIC BLOOD PRESSURE: 137 MMHG | DIASTOLIC BLOOD PRESSURE: 76 MMHG | TEMPERATURE: 97.8 F | HEART RATE: 85 BPM

## 2025-06-20 VITALS
DIASTOLIC BLOOD PRESSURE: 62 MMHG | RESPIRATION RATE: 17 BRPM | BODY MASS INDEX: 19.25 KG/M2 | SYSTOLIC BLOOD PRESSURE: 127 MMHG | TEMPERATURE: 97.2 F | OXYGEN SATURATION: 95 % | HEIGHT: 74 IN | HEART RATE: 78 BPM | WEIGHT: 150 LBS

## 2025-06-20 DIAGNOSIS — Z45.2 PIC LINE (PERIPHERALLY INSERTED CENTRAL CATHETER) REMOVAL: Primary | ICD-10-CM

## 2025-06-20 DIAGNOSIS — M86.9 OSTEOMYELITIS, UNSPECIFIED SITE, UNSPECIFIED TYPE (MULTI): ICD-10-CM

## 2025-06-20 PROCEDURE — 99281 EMR DPT VST MAYX REQ PHY/QHP: CPT | Performed by: EMERGENCY MEDICINE

## 2025-06-20 PROCEDURE — G0299 HHS/HOSPICE OF RN EA 15 MIN: HCPCS

## 2025-06-20 PROCEDURE — 99283 EMERGENCY DEPT VISIT LOW MDM: CPT | Performed by: EMERGENCY MEDICINE

## 2025-06-20 RX ADMIN — Medication 10 ML: at 14:34

## 2025-06-20 ASSESSMENT — ENCOUNTER SYMPTOMS
DENIES PAIN: 1
APPETITE LEVEL: GOOD
PERSON REPORTING PAIN: PATIENT
LAST BOWEL MOVEMENT: 67376
HEADACHES: 1

## 2025-06-20 ASSESSMENT — ACTIVITIES OF DAILY LIVING (ADL)
HOME_HEALTH_OASIS: 00
OASIS_M1830: 00

## 2025-06-20 ASSESSMENT — PAIN SCALES - GENERAL: PAINLEVEL_OUTOF10: 0 - NO PAIN

## 2025-06-20 ASSESSMENT — PAIN - FUNCTIONAL ASSESSMENT: PAIN_FUNCTIONAL_ASSESSMENT: 0-10

## 2025-06-20 NOTE — ED TRIAGE NOTES
Pt presents to ED needing a PICC line removed after being discharged from home care for antibiotic care

## 2025-06-20 NOTE — ED PROVIDER NOTES
Emergency Department Provider Note        History of Present Illness     History provided by: Patient  Limitations to History: None  External Records Reviewed with Brief Summary: Outpatient progress note from 25 which showed patient following with infectious disease for septic arthritis and had a PICC line in place for IV antibiotics.  Patient finishes course of IV antibiotics on , at which point patient able to have his PICC line removed.    HPI:  Joellen Narayan is a 24 y.o. male with history of nodular lymphocyte predominent HL s/p ritux/pred and HbSS SCD (mild splenic sequestration in , priapism, acute chest syndrome, and nocturnal hypoxia (baseline 2L at night) presenting today for PICC line removal. Patient states the nurse attempted to remove his catheter earlier but encounter some resistance. Patient denies any pain in the arm, denies numbness or tingling in the extremity. Denies fevers or chills. Denies chest pain or shortness of breath.  Denies swelling in the extremities.  Patient has no complaints at this time.    Physical Exam   Triage vitals:  T 36.2 °C (97.2 °F)  HR 78  /62  RR 17  O2 95 % None (Room air)    General: Awake, alert, in no acute distress  Eyes: Gaze conjugate.  No scleral icterus or injection, PERRLA, EOMI  HENT: Normo-cephalic, atraumatic. No stridor  CV: Regular rate, regular rhythm. Radial pulses 2+ bilaterally  Resp: Breathing non-labored, speaking in full sentences.   MSK/Extremities: No gross bony deformities. Moving all extremities  Skin: Warm. Appropriate color. Left arm picc line site has no bleeding, clotting, erythema or swelling, area is nontender to palpation.   Neuro: Alert. Oriented. Face symmetric. Speech is fluent.  Gross strength and sensation intact in b/l UE and LEs  Psych: Appropriate mood and affect    Medical Decision Making & ED Course   Medical Decision Makin y.o. male with history of nodular lymphocyte predominent HL s/p ritux/pred  and HbSS SCD (mild splenic sequestration in 2001, priapism, acute chest syndrome, and nocturnal hypoxia (baseline 2L at night) presenting today for PICC line removal.  Upon initial evaluation patient is well-appearing with reassuring vital signs.  Differentials at this time include PICC line removal.  Additional considerations included thrombus, infection, although these were deemed unlikely as time as patient denied any symptoms of extremity swelling or paresthesias, and denies any fevers or infectious-like symptoms.  Dressings removed from the patient's PICC line.  PICC line was removed using steady constant pressure, no resistance was met.  Entire PICC line appeared intact. Pressure applied and site was wrapped.   Patient was discharged home in stable condition given appropriate return precautions.    ----    Differential diagnoses considered include but are not limited to: As above     Social Determinants of Health which Significantly Impact Care: None identified     EKG Independent Interpretation: EKG not obtained    Independent Result Review and Interpretation: None obtained    Chronic conditions affecting the patient's care: As documented above in [facility]    The patient was discussed with the following consultants/services: None    Care Considerations: As documented above in [facility]    ED Course:  Diagnoses as of 06/20/25 1717   PIC line (peripherally inserted central catheter) removal     Disposition   As a result of the work-up, the patient was discharged home.  he was informed of his diagnosis and instructed to come back with any concerns or worsening of condition.  he and was agreeable to the plan as discussed above.  he was given the opportunity to ask questions.  All of the patient's questions were answered.    Procedures   Procedures    Patient seen and discussed with ED attending physician.    Stephan Nunez DO  Emergency Medicine       Stephan Nunez DO  Resident  06/20/25 1721       Stephan Nunez  DO  Resident  06/21/25 7827

## 2025-06-22 LAB
ATRIAL RATE: 91 BPM
P AXIS: 59 DEGREES
P OFFSET: 180 MS
P ONSET: 130 MS
PR INTERVAL: 172 MS
Q ONSET: 216 MS
QRS COUNT: 15 BEATS
QRS DURATION: 102 MS
QT INTERVAL: 346 MS
QTC CALCULATION(BAZETT): 425 MS
QTC FREDERICIA: 397 MS
R AXIS: 86 DEGREES
T AXIS: 16 DEGREES
T OFFSET: 389 MS
VENTRICULAR RATE: 91 BPM

## 2025-06-23 ENCOUNTER — SOCIAL WORK (OUTPATIENT)
Dept: CASE MANAGEMENT | Facility: HOSPITAL | Age: 25
End: 2025-06-23
Payer: COMMERCIAL

## 2025-06-23 NOTE — PROGRESS NOTES
Work Documentation    Documentation Type: Leave of absence   Date Requested: 6/23/2025  Individual Requesting Documentation: Joellen   Completed By: Risa SANCHEZ, signed by Dr. Cruz   Documentation submitted to : Joellen's email  ([email or web address] ) on 06/23/25  Additional Information: Scanned to EMR.

## 2025-06-26 ENCOUNTER — APPOINTMENT (OUTPATIENT)
Dept: HEMATOLOGY/ONCOLOGY | Facility: HOSPITAL | Age: 25
End: 2025-06-26
Payer: COMMERCIAL

## 2025-06-26 VITALS
BODY MASS INDEX: 19.82 KG/M2 | TEMPERATURE: 100.4 F | SYSTOLIC BLOOD PRESSURE: 130 MMHG | RESPIRATION RATE: 15 BRPM | HEART RATE: 73 BPM | DIASTOLIC BLOOD PRESSURE: 58 MMHG | WEIGHT: 154.4 LBS | OXYGEN SATURATION: 92 %

## 2025-06-26 DIAGNOSIS — C81.03 NODULAR LYMPHOCYTE PREDOMINANT HODGKIN LYMPHOMA OF INTRA-ABDOMINAL LYMPH NODES (MULTI): ICD-10-CM

## 2025-06-26 DIAGNOSIS — C81.70: ICD-10-CM

## 2025-06-26 PROCEDURE — 99215 OFFICE O/P EST HI 40 MIN: CPT | Performed by: INTERNAL MEDICINE

## 2025-06-26 ASSESSMENT — PAIN SCALES - GENERAL: PAINLEVEL_OUTOF10: 0-NO PAIN

## 2025-07-01 NOTE — PROGRESS NOTES
prednisonePatient ID: Joellen Narayan is a 24 y.o. male.  Referring Physician: Jenise Jenkins, APRN-CNP  [id number] [place] [address]  Primary Care Provider: Ian Cruz MD      Subjective    Patient is a 23-year-old male with a past medical history of sickle cell disease (C/B splenic/sequestration, priapism, and acute chest syndrome), and newly diagnosed nodular lymphocyte predominant Hodgkins lymphoma(NLPHL)is here for further discussion and management. He came with his monther.  12/16/23: He was found to have retroperitoneal LN's and recent PET showed slight increase RPLN's in size, Interval development on multiple FDG avid focus within sternum, vertebral body, R acetabular wall and L femoral head. MRI C/T/L spine (12/20) slight decreased marrow signal throughout the spine consistent with chronic anemia in sickle cell disease. MRI Pelvis (12/20) T2 and FLAIR hyperintense signal of the R superior endplate of the L5 vertebral body, R superior acetabulum, and L femoral head corresponding to hypermetabolic lesions seen on PET that may represent osseous lymphomatous involvement. He underwent paraaortic LN Bx per IR on 11/30/23 and path was c/w NLPHL(grade II) He denies recent night sweats, weight loss, n/v/d or abd pain. He also denies bowel/urinary problems.   1/18/24: here for chemotherapy. Since last visit, he was seen 2 x at ED with sickle cell crisis. Doing ok since.   7/25/24: completed his chemo 6/7/24. Recently hospitalized with SS crisis 7/8 s/p ERCP with sphincterotomy for cholodocholithiasis, s/p 2U PRBC per Dr. Cruz. Plan for outpatient CCX when bili at his baseline.   10/22/24: here for routine follow up. Recently hospitalized for cholecystitis. Ccx scheduled in 10 days. No new c/o  11/21/24: had PET scan. No new c/o  6/26/25: here for follow up. Still getting admitted frequently for sickle cell crisis. Denies weight loss or night sweats.        Review of Systems   All other systems reviewed  and are negative.       Objective   BSA: 1.91 meters squared  /58 (BP Location: Left arm, Patient Position: Sitting, BP Cuff Size: Adult)   Pulse 73   Temp (!) 38 °C (100.4 °F) (Skin)   Resp 15   Wt 70 kg (154 lb 6.4 oz)   SpO2 92%   BMI 19.82 kg/m²     Family History   Problem Relation Name Age of Onset    Sickle cell trait Mother      Diabetes Mother      Sickle cell trait Father      Lung cancer Brother       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 (CMS/HCC)     1/18/2024 - 6/7/2024 Chemotherapy    R-CHOP (Cyclophosphamide / DOXOrubicin / VinCRIStine / PredniSONE) + RiTUXimab, 21 Day Cycles         Joellen Narayan  reports that he has been smoking cigars. He has been exposed to tobacco smoke. He has never used smokeless tobacco.  He  reports that he does not currently use alcohol.  He  reports current drug use. Drug: Marijuana.    Physical Exam  HENT:      Head: Normocephalic.   Eyes:      Pupils: Pupils are equal, round, and reactive to light.      Comments: jaundice   Cardiovascular:      Rate and Rhythm: Normal rate.      Pulses: Normal pulses.      Comments: 2/6 AMADEO  Pulmonary:      Effort: Pulmonary effort is normal.      Breath sounds: Normal breath sounds.   Abdominal:      General: Abdomen is flat.      Palpations: Abdomen is soft.   Musculoskeletal:         General: Normal range of motion.      Cervical back: Normal range of motion and neck supple.   Neurological:      General: No focal deficit present.      Mental Status: He is alert.     === 11/18/24 ===    NM PET CT LYMPHOMA STAGING    - Impression -  1.  Interval increased metabolic activity within upper abdominal and  bilateral inguinal lymph nodes compatible with interval worsening of  lymphomatous disease. (Deauville 5).  2. No evidence of hypermetabolic  extranodal disease.  3. Moderate hypermetabolic activity within the tonsils is nonspecific  and may be reactive in nature.  4. Increased diffuse mild metabolic activity within the parotid and  mandibular glands. Correlate with concern for treatment related  sialadenitis.      Performance Status:  Asymptomatic    Assessment/Plan      NLPHL  - echocardiogram reviewed with patient  - elevated LDH/bili partially from sickle cell disease  - chemotherapy was discussed(R-CHOP).-> we decided to simplify his chemotherapy to Rituxan and prdnisone q 3 weeks mainly due to frequent sickle cell crisis  -tolerated chemo well.  - recent CT while hospitalized showed significant improvement LN's on 2/16/24  - s/p 6 cycle of R/prednisone(1/18-6/7/24)  - CT/PET as above, c/w disease recurrence-. Remains relatively asymptomatic  - discussed with patient and his mother re; treatment options(including Rituxan maintenance) vs, observation.   - repeat CT/PET in 3 months    Sickle cell anemia    Gall stones  - plan for CCx    Hypoxia   On home O2 but not compliant  - encouraged him to use-> if not he will have more frequent SS crisis     RTC after CT/PET      Melissa Stoll MD

## 2025-07-08 DIAGNOSIS — C81.03 NODULAR LYMPHOCYTE PREDOMINANT HODGKIN LYMPHOMA OF INTRA-ABDOMINAL LYMPH NODES (MULTI): Primary | ICD-10-CM

## 2025-07-09 ENCOUNTER — APPOINTMENT (OUTPATIENT)
Dept: HEMATOLOGY/ONCOLOGY | Facility: HOSPITAL | Age: 25
End: 2025-07-09
Payer: COMMERCIAL

## 2025-07-10 ENCOUNTER — APPOINTMENT (OUTPATIENT)
Dept: RADIOLOGY | Facility: HOSPITAL | Age: 25
End: 2025-07-10
Payer: COMMERCIAL

## 2025-07-14 ENCOUNTER — APPOINTMENT (OUTPATIENT)
Dept: RADIOLOGY | Facility: HOSPITAL | Age: 25
End: 2025-07-14
Payer: COMMERCIAL

## 2025-07-14 ENCOUNTER — CLINICAL SUPPORT (OUTPATIENT)
Dept: EMERGENCY MEDICINE | Facility: HOSPITAL | Age: 25
DRG: 811 | End: 2025-07-14
Payer: COMMERCIAL

## 2025-07-14 ENCOUNTER — HOSPITAL ENCOUNTER (INPATIENT)
Facility: HOSPITAL | Age: 25
DRG: 811 | End: 2025-07-14
Attending: EMERGENCY MEDICINE
Payer: COMMERCIAL

## 2025-07-14 ENCOUNTER — APPOINTMENT (OUTPATIENT)
Dept: RADIOLOGY | Facility: HOSPITAL | Age: 25
DRG: 811 | End: 2025-07-14
Payer: COMMERCIAL

## 2025-07-14 DIAGNOSIS — D57.00 SICKLE CELL ANEMIA WITH PAIN: ICD-10-CM

## 2025-07-14 DIAGNOSIS — D57.00 SICKLE CELL PAIN CRISIS (MULTI): Primary | ICD-10-CM

## 2025-07-14 DIAGNOSIS — K80.20 CALCULUS OF GALLBLADDER WITHOUT CHOLECYSTITIS WITHOUT OBSTRUCTION: ICD-10-CM

## 2025-07-14 LAB
ALBUMIN SERPL BCP-MCNC: 4.7 G/DL (ref 3.4–5)
ALP SERPL-CCNC: 113 U/L (ref 33–120)
ALT SERPL W P-5'-P-CCNC: 26 U/L (ref 10–52)
AMPHETAMINES UR QL SCN: ABNORMAL
ANION GAP SERPL CALC-SCNC: 13 MMOL/L (ref 10–20)
AST SERPL W P-5'-P-CCNC: 50 U/L (ref 9–39)
ATRIAL RATE: 68 BPM
BARBITURATES UR QL SCN: ABNORMAL
BASOPHILS # BLD MANUAL: 0.14 X10*3/UL (ref 0–0.1)
BASOPHILS NFR BLD MANUAL: 0.9 %
BILIRUB SERPL-MCNC: 10.4 MG/DL (ref 0–1.2)
BLASTS # BLD MANUAL: 0 X10*3/UL
BLASTS NFR BLD MANUAL: 0 %
BUN SERPL-MCNC: 11 MG/DL (ref 6–23)
BZE UR QL SCN: ABNORMAL
CALCIUM SERPL-MCNC: 9.7 MG/DL (ref 8.6–10.6)
CANNABINOIDS UR QL SCN: ABNORMAL
CHLORIDE SERPL-SCNC: 106 MMOL/L (ref 98–107)
CO2 SERPL-SCNC: 24 MMOL/L (ref 21–32)
CREAT SERPL-MCNC: 0.62 MG/DL (ref 0.5–1.3)
CREAT UR-MCNC: 101.4 MG/DL (ref 20–370)
EGFRCR SERPLBLD CKD-EPI 2021: >90 ML/MIN/1.73M*2
EOSINOPHIL # BLD MANUAL: 0.28 X10*3/UL (ref 0–0.7)
EOSINOPHIL NFR BLD MANUAL: 1.8 %
ERYTHROCYTE [DISTWIDTH] IN BLOOD BY AUTOMATED COUNT: 21.4 % (ref 11.5–14.5)
GLUCOSE SERPL-MCNC: 86 MG/DL (ref 74–99)
HCT VFR BLD AUTO: 23 % (ref 41–52)
HGB BLD-MCNC: 8.5 G/DL (ref 13.5–17.5)
HGB RETIC QN: 32 PG (ref 28–38)
IMM GRANULOCYTES # BLD AUTO: 0.9 X10*3/UL (ref 0–0.7)
IMM GRANULOCYTES NFR BLD AUTO: 5.8 % (ref 0–0.9)
IMMATURE RETIC FRACTION: 21.9 %
LDH SERPL L TO P-CCNC: 534 U/L (ref 84–246)
LYMPHOCYTES # BLD MANUAL: 2.88 X10*3/UL (ref 1.2–4.8)
LYMPHOCYTES NFR BLD MANUAL: 18.6 %
MCH RBC QN AUTO: 31.5 PG (ref 26–34)
MCHC RBC AUTO-ENTMCNC: 37 G/DL (ref 32–36)
MCV RBC AUTO: 85 FL (ref 80–100)
METAMYELOCYTES # BLD MANUAL: 0.14 X10*3/UL
METAMYELOCYTES NFR BLD MANUAL: 0.9 %
MONOCYTES # BLD MANUAL: 0.54 X10*3/UL (ref 0.1–1)
MONOCYTES NFR BLD MANUAL: 3.5 %
MYELOCYTES # BLD MANUAL: 0 X10*3/UL
MYELOCYTES NFR BLD MANUAL: 0 %
NEUTROPHILS # BLD MANUAL: 11.52 X10*3/UL (ref 1.2–7.7)
NEUTS BAND # BLD MANUAL: 0 X10*3/UL (ref 0–0.7)
NEUTS BAND NFR BLD MANUAL: 0 %
NEUTS SEG # BLD MANUAL: 11.52 X10*3/UL (ref 1.2–7)
NEUTS SEG NFR BLD MANUAL: 74.3 %
NRBC BLD MANUAL-RTO: 0 % (ref 0–0)
NRBC BLD-RTO: 3.8 /100 WBCS (ref 0–0)
P AXIS: 60 DEGREES
P OFFSET: 169 MS
P ONSET: 121 MS
PCP UR QL SCN: ABNORMAL
PLASMA CELLS # BLD MANUAL: 0 X10*3/UL
PLASMA CELLS NFR BLD MANUAL: 0 %
PLATELET # BLD AUTO: 275 X10*3/UL (ref 150–450)
POLYCHROMASIA BLD QL SMEAR: ABNORMAL
POTASSIUM SERPL-SCNC: 3.9 MMOL/L (ref 3.5–5.3)
PR INTERVAL: 186 MS
PROMYELOCYTES # BLD MANUAL: 0 X10*3/UL
PROMYELOCYTES NFR BLD MANUAL: 0 %
PROT SERPL-MCNC: 7.1 G/DL (ref 6.4–8.2)
Q ONSET: 214 MS
QRS COUNT: 11 BEATS
QRS DURATION: 104 MS
QT INTERVAL: 374 MS
QTC CALCULATION(BAZETT): 397 MS
QTC FREDERICIA: 390 MS
R AXIS: 76 DEGREES
RBC # BLD AUTO: 2.7 X10*6/UL (ref 4.5–5.9)
RBC MORPH BLD: ABNORMAL
RETICS #: 0.77 X10*6/UL (ref 0.02–0.12)
RETICS/RBC NFR AUTO: 28.4 % (ref 0.5–2)
SICKLE CELLS BLD QL SMEAR: ABNORMAL
SODIUM SERPL-SCNC: 139 MMOL/L (ref 136–145)
T AXIS: 28 DEGREES
T OFFSET: 401 MS
TARGETS BLD QL SMEAR: ABNORMAL
TOTAL CELLS COUNTED BLD: 113
VARIANT LYMPHS # BLD MANUAL: 0 X10*3/UL (ref 0–0.5)
VARIANT LYMPHS NFR BLD: 0 %
VENTRICULAR RATE: 68 BPM
WBC # BLD AUTO: 15.5 X10*3/UL (ref 4.4–11.3)

## 2025-07-14 PROCEDURE — 93010 ELECTROCARDIOGRAM REPORT: CPT | Performed by: EMERGENCY MEDICINE

## 2025-07-14 PROCEDURE — 80346 BENZODIAZEPINES1-12: CPT

## 2025-07-14 PROCEDURE — 2500000001 HC RX 250 WO HCPCS SELF ADMINISTERED DRUGS (ALT 637 FOR MEDICARE OP)

## 2025-07-14 PROCEDURE — 93005 ELECTROCARDIOGRAM TRACING: CPT

## 2025-07-14 PROCEDURE — 71046 X-RAY EXAM CHEST 2 VIEWS: CPT

## 2025-07-14 PROCEDURE — 83020 HEMOGLOBIN ELECTROPHORESIS: CPT | Performed by: PATHOLOGY

## 2025-07-14 PROCEDURE — 96374 THER/PROPH/DIAG INJ IV PUSH: CPT

## 2025-07-14 PROCEDURE — 82248 BILIRUBIN DIRECT: CPT

## 2025-07-14 PROCEDURE — 2500000004 HC RX 250 GENERAL PHARMACY W/ HCPCS (ALT 636 FOR OP/ED): Mod: JZ,TB

## 2025-07-14 PROCEDURE — 85007 BL SMEAR W/DIFF WBC COUNT: CPT

## 2025-07-14 PROCEDURE — 80053 COMPREHEN METABOLIC PANEL: CPT

## 2025-07-14 PROCEDURE — 80349 CANNABINOIDS NATURAL: CPT

## 2025-07-14 PROCEDURE — 83615 LACTATE (LD) (LDH) ENZYME: CPT

## 2025-07-14 PROCEDURE — 96376 TX/PRO/DX INJ SAME DRUG ADON: CPT

## 2025-07-14 PROCEDURE — 96375 TX/PRO/DX INJ NEW DRUG ADDON: CPT

## 2025-07-14 PROCEDURE — 2500000004 HC RX 250 GENERAL PHARMACY W/ HCPCS (ALT 636 FOR OP/ED): Mod: JZ,TB | Performed by: EMERGENCY MEDICINE

## 2025-07-14 PROCEDURE — 2500000005 HC RX 250 GENERAL PHARMACY W/O HCPCS: Performed by: EMERGENCY MEDICINE

## 2025-07-14 PROCEDURE — 99223 1ST HOSP IP/OBS HIGH 75: CPT

## 2025-07-14 PROCEDURE — 99285 EMERGENCY DEPT VISIT HI MDM: CPT | Performed by: EMERGENCY MEDICINE

## 2025-07-14 PROCEDURE — 85045 AUTOMATED RETICULOCYTE COUNT: CPT

## 2025-07-14 PROCEDURE — 71046 X-RAY EXAM CHEST 2 VIEWS: CPT | Performed by: RADIOLOGY

## 2025-07-14 PROCEDURE — 80307 DRUG TEST PRSMV CHEM ANLYZR: CPT

## 2025-07-14 PROCEDURE — 36415 COLL VENOUS BLD VENIPUNCTURE: CPT

## 2025-07-14 PROCEDURE — 2500000004 HC RX 250 GENERAL PHARMACY W/ HCPCS (ALT 636 FOR OP/ED)

## 2025-07-14 PROCEDURE — 99285 EMERGENCY DEPT VISIT HI MDM: CPT | Mod: 25 | Performed by: EMERGENCY MEDICINE

## 2025-07-14 PROCEDURE — 2500000005 HC RX 250 GENERAL PHARMACY W/O HCPCS

## 2025-07-14 PROCEDURE — 85027 COMPLETE CBC AUTOMATED: CPT

## 2025-07-14 PROCEDURE — 1170000001 HC PRIVATE ONCOLOGY ROOM DAILY

## 2025-07-14 PROCEDURE — 83021 HEMOGLOBIN CHROMOTOGRAPHY: CPT

## 2025-07-14 RX ORDER — FOLIC ACID 1 MG/1
1 TABLET ORAL DAILY
Status: DISCONTINUED | OUTPATIENT
Start: 2025-07-14 | End: 2025-07-24 | Stop reason: HOSPADM

## 2025-07-14 RX ORDER — NAPROXEN SODIUM 220 MG/1
81 TABLET, FILM COATED ORAL 2 TIMES DAILY
Status: DISCONTINUED | OUTPATIENT
Start: 2025-07-14 | End: 2025-07-14

## 2025-07-14 RX ORDER — HYDROMORPHONE HYDROCHLORIDE 1 MG/ML
1 INJECTION, SOLUTION INTRAMUSCULAR; INTRAVENOUS; SUBCUTANEOUS ONCE
Status: COMPLETED | OUTPATIENT
Start: 2025-07-14 | End: 2025-07-14

## 2025-07-14 RX ORDER — AMOXICILLIN 250 MG
2 CAPSULE ORAL 2 TIMES DAILY PRN
Status: DISCONTINUED | OUTPATIENT
Start: 2025-07-14 | End: 2025-07-15

## 2025-07-14 RX ORDER — ONDANSETRON 8 MG/1
8 TABLET, FILM COATED ORAL EVERY 6 HOURS PRN
Status: DISCONTINUED | OUTPATIENT
Start: 2025-07-14 | End: 2025-07-24 | Stop reason: HOSPADM

## 2025-07-14 RX ORDER — DIPHENHYDRAMINE HCL 25 MG
25 CAPSULE ORAL EVERY 6 HOURS PRN
Status: DISCONTINUED | OUTPATIENT
Start: 2025-07-14 | End: 2025-07-24 | Stop reason: HOSPADM

## 2025-07-14 RX ORDER — ONDANSETRON HYDROCHLORIDE 2 MG/ML
4 INJECTION, SOLUTION INTRAVENOUS ONCE
Status: COMPLETED | OUTPATIENT
Start: 2025-07-14 | End: 2025-07-14

## 2025-07-14 RX ORDER — POLYETHYLENE GLYCOL 3350 17 G/17G
17 POWDER, FOR SOLUTION ORAL DAILY
Status: DISCONTINUED | OUTPATIENT
Start: 2025-07-14 | End: 2025-07-24 | Stop reason: HOSPADM

## 2025-07-14 RX ORDER — POLYETHYLENE GLYCOL 3350 17 G/17G
17 POWDER, FOR SOLUTION ORAL DAILY PRN
Status: DISCONTINUED | OUTPATIENT
Start: 2025-07-14 | End: 2025-07-15

## 2025-07-14 RX ORDER — LIDOCAINE 560 MG/1
2 PATCH PERCUTANEOUS; TOPICAL; TRANSDERMAL DAILY
Status: DISCONTINUED | OUTPATIENT
Start: 2025-07-14 | End: 2025-07-24 | Stop reason: HOSPADM

## 2025-07-14 RX ORDER — HYDROMORPHONE HYDROCHLORIDE 1 MG/ML
1 INJECTION, SOLUTION INTRAMUSCULAR; INTRAVENOUS; SUBCUTANEOUS
Status: DISCONTINUED | OUTPATIENT
Start: 2025-07-14 | End: 2025-07-14

## 2025-07-14 RX ADMIN — HYDROMORPHONE HYDROCHLORIDE 4 MG: 2 INJECTION, SOLUTION INTRAMUSCULAR; INTRAVENOUS; SUBCUTANEOUS at 19:58

## 2025-07-14 RX ADMIN — HYDROMORPHONE HYDROCHLORIDE 1 MG: 1 INJECTION, SOLUTION INTRAMUSCULAR; INTRAVENOUS; SUBCUTANEOUS at 08:01

## 2025-07-14 RX ADMIN — Medication 2 L/MIN: at 08:30

## 2025-07-14 RX ADMIN — HYDROMORPHONE HYDROCHLORIDE 4 MG: 2 INJECTION, SOLUTION INTRAMUSCULAR; INTRAVENOUS; SUBCUTANEOUS at 22:28

## 2025-07-14 RX ADMIN — HYDROMORPHONE HYDROCHLORIDE 2 MG: 2 INJECTION, SOLUTION INTRAMUSCULAR; INTRAVENOUS; SUBCUTANEOUS at 18:45

## 2025-07-14 RX ADMIN — HYDROMORPHONE HYDROCHLORIDE 2 MG: 2 INJECTION, SOLUTION INTRAMUSCULAR; INTRAVENOUS; SUBCUTANEOUS at 10:24

## 2025-07-14 RX ADMIN — LIDOCAINE 4% 2 PATCH: 40 PATCH TOPICAL at 13:00

## 2025-07-14 RX ADMIN — HYDROMORPHONE HYDROCHLORIDE 2 MG: 2 INJECTION, SOLUTION INTRAMUSCULAR; INTRAVENOUS; SUBCUTANEOUS at 12:22

## 2025-07-14 RX ADMIN — HYDROMORPHONE HYDROCHLORIDE 4 MG: 2 INJECTION, SOLUTION INTRAMUSCULAR; INTRAVENOUS; SUBCUTANEOUS at 17:09

## 2025-07-14 RX ADMIN — HYDROMORPHONE HYDROCHLORIDE 4 MG: 2 INJECTION, SOLUTION INTRAMUSCULAR; INTRAVENOUS; SUBCUTANEOUS at 15:06

## 2025-07-14 RX ADMIN — FOLIC ACID 1 MG: 1 TABLET ORAL at 16:56

## 2025-07-14 RX ADMIN — OXYCODONE HYDROCHLORIDE 30 MG: 20 TABLET, FILM COATED, EXTENDED RELEASE ORAL at 21:05

## 2025-07-14 RX ADMIN — ONDANSETRON HYDROCHLORIDE 8 MG: 8 TABLET, FILM COATED ORAL at 21:08

## 2025-07-14 RX ADMIN — HYDROMORPHONE HYDROCHLORIDE 2 MG: 2 INJECTION, SOLUTION INTRAMUSCULAR; INTRAVENOUS; SUBCUTANEOUS at 08:47

## 2025-07-14 RX ADMIN — Medication 2 L/MIN: at 20:04

## 2025-07-14 RX ADMIN — ONDANSETRON 4 MG: 2 INJECTION INTRAMUSCULAR; INTRAVENOUS at 08:01

## 2025-07-14 SDOH — SOCIAL STABILITY: SOCIAL INSECURITY: HAVE YOU HAD ANY THOUGHTS OF HARMING ANYONE ELSE?: NO

## 2025-07-14 SDOH — ECONOMIC STABILITY: INCOME INSECURITY: IN THE PAST 12 MONTHS HAS THE ELECTRIC, GAS, OIL, OR WATER COMPANY THREATENED TO SHUT OFF SERVICES IN YOUR HOME?: NO

## 2025-07-14 SDOH — SOCIAL STABILITY: SOCIAL INSECURITY: WITHIN THE LAST YEAR, HAVE YOU BEEN AFRAID OF YOUR PARTNER OR EX-PARTNER?: NO

## 2025-07-14 SDOH — SOCIAL STABILITY: SOCIAL INSECURITY: ARE YOU OR HAVE YOU BEEN THREATENED OR ABUSED PHYSICALLY, EMOTIONALLY, OR SEXUALLY BY ANYONE?: NO

## 2025-07-14 SDOH — ECONOMIC STABILITY: FOOD INSECURITY: WITHIN THE PAST 12 MONTHS, YOU WORRIED THAT YOUR FOOD WOULD RUN OUT BEFORE YOU GOT THE MONEY TO BUY MORE.: NEVER TRUE

## 2025-07-14 SDOH — SOCIAL STABILITY: SOCIAL INSECURITY: WITHIN THE LAST YEAR, HAVE YOU BEEN HUMILIATED OR EMOTIONALLY ABUSED IN OTHER WAYS BY YOUR PARTNER OR EX-PARTNER?: NO

## 2025-07-14 SDOH — SOCIAL STABILITY: SOCIAL INSECURITY: ARE THERE ANY APPARENT SIGNS OF INJURIES/BEHAVIORS THAT COULD BE RELATED TO ABUSE/NEGLECT?: NO

## 2025-07-14 SDOH — SOCIAL STABILITY: SOCIAL INSECURITY: HAVE YOU HAD THOUGHTS OF HARMING ANYONE ELSE?: NO

## 2025-07-14 SDOH — SOCIAL STABILITY: SOCIAL INSECURITY: HAS ANYONE EVER THREATENED TO HURT YOUR FAMILY OR YOUR PETS?: NO

## 2025-07-14 SDOH — SOCIAL STABILITY: SOCIAL INSECURITY: DO YOU FEEL UNSAFE GOING BACK TO THE PLACE WHERE YOU ARE LIVING?: NO

## 2025-07-14 SDOH — ECONOMIC STABILITY: FOOD INSECURITY: WITHIN THE PAST 12 MONTHS, THE FOOD YOU BOUGHT JUST DIDN'T LAST AND YOU DIDN'T HAVE MONEY TO GET MORE.: NEVER TRUE

## 2025-07-14 SDOH — SOCIAL STABILITY: SOCIAL INSECURITY: DOES ANYONE TRY TO KEEP YOU FROM HAVING/CONTACTING OTHER FRIENDS OR DOING THINGS OUTSIDE YOUR HOME?: NO

## 2025-07-14 SDOH — SOCIAL STABILITY: SOCIAL INSECURITY: ABUSE: ADULT

## 2025-07-14 SDOH — SOCIAL STABILITY: SOCIAL INSECURITY: WERE YOU ABLE TO COMPLETE ALL THE BEHAVIORAL HEALTH SCREENINGS?: YES

## 2025-07-14 SDOH — SOCIAL STABILITY: SOCIAL INSECURITY: DO YOU FEEL ANYONE HAS EXPLOITED OR TAKEN ADVANTAGE OF YOU FINANCIALLY OR OF YOUR PERSONAL PROPERTY?: NO

## 2025-07-14 ASSESSMENT — PAIN DESCRIPTION - ORIENTATION
ORIENTATION: MID
ORIENTATION: LOWER
ORIENTATION: MID
ORIENTATION: RIGHT;LEFT
ORIENTATION: LOWER

## 2025-07-14 ASSESSMENT — ACTIVITIES OF DAILY LIVING (ADL)
DRESSING YOURSELF: INDEPENDENT
FEEDING YOURSELF: INDEPENDENT
JUDGMENT_ADEQUATE_SAFELY_COMPLETE_DAILY_ACTIVITIES: YES
BATHING: INDEPENDENT
HEARING - LEFT EAR: FUNCTIONAL
WALKS IN HOME: INDEPENDENT
GROOMING: INDEPENDENT
PATIENT'S MEMORY ADEQUATE TO SAFELY COMPLETE DAILY ACTIVITIES?: YES
HEARING - RIGHT EAR: FUNCTIONAL
ADEQUATE_TO_COMPLETE_ADL: YES
LACK_OF_TRANSPORTATION: NO
TOILETING: INDEPENDENT

## 2025-07-14 ASSESSMENT — ENCOUNTER SYMPTOMS
HEMATOLOGIC/LYMPHATIC NEGATIVE: 1
BACK PAIN: 1
PSYCHIATRIC NEGATIVE: 1
RESPIRATORY NEGATIVE: 1
NEUROLOGICAL NEGATIVE: 1
GASTROINTESTINAL NEGATIVE: 1
CONSTITUTIONAL NEGATIVE: 1

## 2025-07-14 ASSESSMENT — LIFESTYLE VARIABLES
HOW OFTEN DO YOU HAVE 6 OR MORE DRINKS ON ONE OCCASION: NEVER
AUDIT-C TOTAL SCORE: 0
HOW OFTEN DO YOU HAVE A DRINK CONTAINING ALCOHOL: NEVER
HAVE PEOPLE ANNOYED YOU BY CRITICIZING YOUR DRINKING: NO
HAVE YOU EVER FELT YOU SHOULD CUT DOWN ON YOUR DRINKING: NO
EVER HAD A DRINK FIRST THING IN THE MORNING TO STEADY YOUR NERVES TO GET RID OF A HANGOVER: NO
AUDIT-C TOTAL SCORE: 0
SKIP TO QUESTIONS 9-10: 1
EVER FELT BAD OR GUILTY ABOUT YOUR DRINKING: NO
TOTAL SCORE: 0
HOW MANY STANDARD DRINKS CONTAINING ALCOHOL DO YOU HAVE ON A TYPICAL DAY: PATIENT DOES NOT DRINK

## 2025-07-14 ASSESSMENT — PAIN SCALES - GENERAL
PAINLEVEL_OUTOF10: 10 - WORST POSSIBLE PAIN
PAINLEVEL_OUTOF10: 10 - WORST POSSIBLE PAIN
PAINLEVEL_OUTOF10: 5 - MODERATE PAIN
PAINLEVEL_OUTOF10: 10 - WORST POSSIBLE PAIN

## 2025-07-14 ASSESSMENT — PAIN DESCRIPTION - LOCATION
LOCATION: CHEST
LOCATION: BACK
LOCATION: CHEST
LOCATION: BACK
LOCATION: CHEST
LOCATION: BACK

## 2025-07-14 ASSESSMENT — PAIN - FUNCTIONAL ASSESSMENT
PAIN_FUNCTIONAL_ASSESSMENT: 0-10

## 2025-07-14 ASSESSMENT — PAIN DESCRIPTION - DESCRIPTORS
DESCRIPTORS: ACHING;CRAMPING
DESCRIPTORS: SHARP;STABBING

## 2025-07-14 NOTE — ED PROVIDER NOTES
Emergency Department Provider Note        History of Present Illness     History provided by: Patient  Limitations to History: None  External Records Reviewed with Brief Summary: None    HPI:  Joellen Narayan is a 24 y.o. male with a past medical history of nodular lymphocyte predominant Hodgkin's lymphoma (an LP HL), not on current active chemo, Hb SS sickle cell disease, priapism, acute chest syndrome, and nocturnal hypoxia (baseline 2-3 L at night) and septic arthritis who presents to the emergency department for sickle cell pain crisis.  Patient reports his back and chest pain started yesterday, worsening ever since, and reports he does not have any pain medications at home.  Patient denies any shortness of breath, chest pain or cough.  Also reports some mild bilateral leg numbness, but no weakness.     Physical Exam   Triage vitals:  T 36.9 °C (98.4 °F)  HR 75  /61  RR 16  O2 (!) 90 %      General: Awake, alert, in no acute distress  Eyes: Gaze conjugate.  No scleral icterus or injection  HENT: Normo-cephalic, atraumatic. No stridor  CV: Regular rate, regular rhythm. Radial pulses 2+ bilaterally  Resp: Breathing non-labored, speaking in full sentences.  Clear to auscultation bilaterally  GI: Soft, non-distended, non-tender. No rebound or guarding.   MSK/Extremities: No gross bony deformities. Moving all extremities. Paravertebral lumbar pain on palpation, no midline tenderness  Skin: Warm. Appropriate color  Neuro: Alert. Oriented. Face symmetric. Speech is fluent.  5/5 strength and sensation intact in b/l UE and Les. Ambulating appropriately, no ataxia.   Psych: Appropriate mood and affect    Medical Decision Making & ED Course   Medical Decision Makin y.o. male presenting with chest and lower back pain, similar to his usual sickle cell pain crisis.  On exam patient is hemodynamically stable, overall well-appearing but in discomfort from his back and chest pain.  Neuroexam is unremarkable, no  midline spine tenderness, no urinary incontinence, sensations intact in bilateral lower extremities and patient is ambulating appropriately with no weakness, afebrile, therefore lower suspicion for acute spinal pathology or septic arthritis at this time.  Lab tests correlate with sickle cell pain crisis, with mild leukocytosis elevated reticulocytes and LDH.  CMP with no significant electrolyte abnormalities, normal renal function.  Patient was given 3 doses of Dilaudid, with some improvement but still endorsing significant pain and discomfort.  Chest x-ray with no acute cardiopulmonary process, therefore lower suspicion for acute chest syndrome.  Patient was placed on 2 L via nasal cannula, however this appears to be baseline for the patient at night.  Given ongoing pain and lack of pain medication at home, patient will be admitted for further pain control and workup if needed.  ----      Differential diagnoses considered include but are not limited to: Sickle cell pain crisis, acute chest syndrome, septic arthritis, pneumonia     Social Determinants of Health which Significantly Impact Care: None identified     EKG Independent Interpretation: The EKG obtained at 7:15 AM was independently interpreted by myself. It demonstrates sinus rhythm with a ventricular rate of 68.  Normal axis. Intervals noted to prolongation. ST segments showed no elevation or other ischemic changes.  No significant change compared to prior EKG from June 2025.    Independent Result Review and Interpretation: Relevant laboratory and radiographic results were reviewed and independently interpreted by myself.  As necessary, they are commented on in the ED Course.    Chronic conditions affecting the patient's care: As documented above in [facility]    The patient was discussed with the following consultants/services: Admission Coordinator who accepted the patient for admission    Care Considerations: As documented above in [facility]    ED Course:  ED Course  as of 07/14/25 1438   Mon Jul 14, 2025   1012 Attending attestation: 24-year-old male with history of non-Hodgkin's lymphoma not currently on chemotherapy, sickle cell disease, on 2 to 3 L of oxygen as needed who is presenting with chest pain, back pain consistent with prior sickle cell crisis.  States that he does not have pain medication at home.  On exam, does appear uncomfortable, oxygen saturation is mildly low but is off of oxygen.  No neurological deficits on exam.  Afebrile.  Will plan for Dilaudid for pain control, chest x-ray does not show any evidence of acute chest syndrome, labs are consistent with baseline. [AM]      ED Course User Index  [AM] Jean Mcgregor MD         Diagnoses as of 07/14/25 1438   Sickle cell pain crisis (Multi)     Disposition   As a result of their workup, the patient will require admission to the hospital.  The patient was informed of his diagnosis.  The patient was given the opportunity to ask questions and I answered them. The patient agreed to be admitted to the hospital.    Procedures   Procedures    Patient seen and discussed with ED attending physician.    Joey Purcell MD  Emergency Medicine     Joey Purcell MD  Resident  07/14/25 3775

## 2025-07-14 NOTE — H&P
History Of Present Illness  Joellen Narayan is a 24 y.o. male presenting with sickle cell pain.    Joellen Narayan is a 24 y.o. 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 Has been doing well postop. Reports he has no oxycodone at home and ran out, states he has been calling outpatient refill line and pharmacy but has not heard back and therefore his pain has increased significantly this morning starting at 4am. We review plan for pain medications, imaging, and plan to reschedule PET.     Denies nausea, vomiting, fever, chills, SOB, bleeding, edema, headaches, or falls.      Past Medical History  He has a past medical history of Acute chest syndrome (Multi), Corrosion of unspecified body region, unspecified degree (12/31/2014), Impetigo (01/04/2024), Nicotine use disorder, Nodular lymphocyte predominant Hodgkin lymphoma, Non Hodgkin's lymphoma, On home oxygen therapy, Personal history of diseases of the blood and blood-forming organs and certain disorders involving the immune mechanism, Personal history of other (healed) physical injury and trauma (01/03/2015), Personal history of other diseases of the circulatory system, Personal history of other diseases of the circulatory system, Priapism, Rash and other nonspecific skin eruption (09/09/2014), and Sickle cell disease (Multi) (2000).    Surgical History  He has a past surgical history that includes IR CVC tunneled (6/21/2022); CT guided percutaneous biopsy LYMPH node superficial (11/18/2022); and  CT guided percutaneous abdominal retroperitoneum biopsy (11/30/2023).    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 He has been exposed to tobacco smoke. He has never used smokeless tobacco. He reports that he does not currently use alcohol. He reports current drug use. Drug: Marijuana.     Allergies  Cefepime, Amoxicillin, and Ceftriaxone    Review of Systems   Constitutional: Negative.    HENT: Negative.     Respiratory: Negative.     Cardiovascular:  Positive for chest pain.   Gastrointestinal: Negative.    Genitourinary: Negative.    Musculoskeletal:  Positive for back pain.   Neurological: Negative.    Hematological: Negative.    Psychiatric/Behavioral: Negative.          Physical Exam  HENT:      Head: Normocephalic.      Right Ear: External ear normal.      Left Ear: External ear normal.      Nose: Nose normal.   Eyes:      Extraocular Movements: Extraocular movements intact.      Conjunctiva/sclera: Conjunctivae normal.   Cardiovascular:      Rate and Rhythm: Normal rate.   Pulmonary:      Effort: Pulmonary effort is normal.      Breath sounds: Normal breath sounds.   Abdominal:      General: Bowel sounds are normal.      Palpations: Abdomen is soft.   Musculoskeletal:      Cervical back: Normal range of motion.   Skin:     General: Skin is warm and dry.   Neurological:      Mental Status: He is alert and oriented to person, place, and time. Mental status is at baseline.   Psychiatric:         Mood and Affect: Mood normal.         Behavior: Behavior normal.         Thought Content: Thought content normal.         Judgment:  "Judgment normal.          Last Recorded Vitals  Blood pressure 148/84, pulse 62, temperature 36.9 °C (98.4 °F), resp. rate 16, height 1.88 m (6' 2\"), weight 70.3 kg (155 lb), SpO2 96%.    Relevant Results  .Scheduled medications  Scheduled Medications[1]  Continuous medications  Continuous Medications[2]  PRN medications  PRN Medications[3]    .  Results for orders placed or performed during the hospital encounter of 07/14/25 (from the past 24 hours)   CBC and Auto Differential   Result Value Ref Range    WBC 15.5 (H) 4.4 - 11.3 x10*3/uL    nRBC 3.8 (H) 0.0 - 0.0 /100 WBCs    RBC 2.70 (L) 4.50 - 5.90 x10*6/uL    Hemoglobin 8.5 (L) 13.5 - 17.5 g/dL    Hematocrit 23.0 (L) 41.0 - 52.0 %    MCV 85 80 - 100 fL    MCH 31.5 26.0 - 34.0 pg    MCHC 37.0 (H) 32.0 - 36.0 g/dL    RDW 21.4 (H) 11.5 - 14.5 %    Platelets 275 150 - 450 x10*3/uL    Immature Granulocytes %, Automated 5.8 (H) 0.0 - 0.9 %    Immature Granulocytes Absolute, Automated 0.90 (H) 0.00 - 0.70 x10*3/uL   Comprehensive metabolic panel   Result Value Ref Range    Glucose 86 74 - 99 mg/dL    Sodium 139 136 - 145 mmol/L    Potassium 3.9 3.5 - 5.3 mmol/L    Chloride 106 98 - 107 mmol/L    Bicarbonate 24 21 - 32 mmol/L    Anion Gap 13 10 - 20 mmol/L    Urea Nitrogen 11 6 - 23 mg/dL    Creatinine 0.62 0.50 - 1.30 mg/dL    eGFR >90 >60 mL/min/1.73m*2    Calcium 9.7 8.6 - 10.6 mg/dL    Albumin 4.7 3.4 - 5.0 g/dL    Alkaline Phosphatase 113 33 - 120 U/L    Total Protein 7.1 6.4 - 8.2 g/dL    AST 50 (H) 9 - 39 U/L    Bilirubin, Total 10.4 (H) 0.0 - 1.2 mg/dL    ALT 26 10 - 52 U/L   Reticulocytes   Result Value Ref Range    Retic % 28.4 (H) 0.5 - 2.0 %    Retic Absolute 0.765 (H) 0.022 - 0.118 x10*6/uL    Reticulocyte Hemoglobin 32 28 - 38 pg    Immature Retic fraction 21.9 (H) <=16.0 %   LDH, Lactate dehydrogenase   Result Value Ref Range     (H) 84 - 246 U/L   Manual Differential   Result Value Ref Range    Neutrophils %, Manual 74.3 40.0 - 80.0 %    Bands %, " Manual 0.0 0.0 - 5.0 %    Lymphocytes %, Manual 18.6 13.0 - 44.0 %    Monocytes %, Manual 3.5 2.0 - 10.0 %    Eosinophils %, Manual 1.8 0.0 - 6.0 %    Basophils %, Manual 0.9 0.0 - 2.0 %    Atypical Lymphocytes %, Manual 0.0 0.0 - 2.0 %    Metamyelocytes %, Manual 0.9 0.0 - 0.0 %    Myelocytes %, Manual 0.0 0.0 - 0.0 %    Plasma Cells %, Manual 0.0 0.00 - 0.00 %    Promyelocytes %, Manual 0.0 0.0 - 0.0 %    Blasts %, Manual 0.0 0.0 - 0.0 %    Seg Neutrophils Absolute, Manual 11.52 (H) 1.20 - 7.00 x10*3/uL    Bands Absolute, Manual 0.00 0.00 - 0.70 x10*3/uL    Lymphocytes Absolute, Manual 2.88 1.20 - 4.80 x10*3/uL    Monocytes Absolute, Manual 0.54 0.10 - 1.00 x10*3/uL    Eosinophils Absolute, Manual 0.28 0.00 - 0.70 x10*3/uL    Basophils Absolute, Manual 0.14 (H) 0.00 - 0.10 x10*3/uL    Atypical Lymphs Absolute, Manual 0.00 0.00 - 0.50 x10*3/uL    Metamyelocytes Absolute, Manual 0.14 0.00 - 0.00 x10*3/uL    Myelocytes Absolute, Manual 0.00 0.00 - 0.00 x10*3/uL    Plasma Cells Absolute, Manual 0.00 0.00 - 0.00 x10*3/uL    Promyelocytes Absolute, Manual 0.00 0.00 - 0.00 x10*3/uL    Blasts Absolute, Manual 0.00 0.00 - 0.00 x10*3/uL    Total Cells Counted 113     Neutrophils Absolute, Manual 11.52 (H) 1.20 - 7.70 x10*3/uL    Manual nRBC per 100 Cells 0.0 0.0 - 0.0 %    RBC Morphology See Below     Polychromasia Mild     Sickle Cells Many     Target Cells Few      *Note: Due to a large number of results and/or encounters for the requested time period, some results have not been displayed. A complete set of results can be found in Results Review.          Assessment/Plan   Assessment & Plan  Sickle cell pain crisis (Multi)      Joellen Narayan is a 24 y.o. 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 Cxr w/o evidence of acute process. EKG WNL. Hemolysis labs slightly elevated. Started on IV dilaudid for pain control. DC home resumed nox O2 pending improvement in pain.     # Hgb SS disease acute on chronic pain   - OARRS reviewed on admission, no aberrancies noted (last filled 5/22 oxy ER qty 28 tabs, qty 14 days, and 5/16 oxy IR qty 28 tabs 7 days)  - No carepath available   - pt reports he has no home oxycodone at home and has been calling refill line and pharmacy without success or hearing back   - CXR w/o evidence of acute process  - Hemolysis labs slightly above baseline, no indication for simple transfusion at this time   - started 4mg IVP dilaudid q2 PRN severe pain  - re-started home oxycontin ER 30 mg BID on admit   - EKG in ED showed sinus rhythm, no ST elevation or ischemic changes   - c/w home folic acid 1mg daily   - Hg S pending   - utox pending collection   - supportive care: lidocaine patches, hot packs   - bowel regimen for opioid induced constipation, zofran for opioid induced nausea, and benadrly for opioid induced pruritis     # Hx recent Septic Arthritis  R elbow   - Recently admitted and discharged from hospital on 5/16 and treated for SA  - MRI w/anesthesia (5/9) R radius/humerus showing concern for septic arthritis, osteomyelitis, myositis, severe muscle and subcutaneous soft tissue edema, and possible cellulitis   - 5/10 OR s/p R elbow I&D with ortho    - Final Ortho recs from 5/12; WBAT RUE, Mepilex remove POD7 (5/17/25), drain removed 5/12, require 6 weeks total of DVT prophylaxis from an orthopedic standpoint, Calcium/Vitamin D 500mg-400IU BID for 6 weeks (continued on admit), follow up w/ Dr. Tello 2 weeks after surgery for post-operative appointment  (scheduled 6/9)   - s/p IV daptomycin 6mg/kg q24h and ertapenem 1g q24h until 6/20/25  - 6/9 Most recent Dr. Tello visit, sutures removed, cont to work on ROM, pt declining PT, fuv in 6-8 weeks for xrays of R elbow, upcoming 8/18     # Hx of nocturnal oxygen dependence   - On 2-3L at night, however patient reports daytime hypoxia as well in the past, currently on 2L     # Hx choledocholithiasis 7/2024 -improved    - Worsening hemolysis in setting of active infection could be contributing to increased hyperbilirubinemia    - July 2024 s/p ERCP with biliary sphincterotomy where sludge and stones were removed, achieving complete clearance    - 1/31/25 was planned for cholecystectomy with Dr. Dove but no show on day of surgery and again 2/13 and 2/27    - Tbili stable     # Hx Priapism   - No active issues on admit    - Hx previously drained by urology    - Continue home Sudafed 30mg daily PRN priapism     # Nodular lymphocyte predominant Hodgkins lymphoma (NLPHL)     - Follows with Dr. Stoll   - s/p Rituxan and prednisone q3 weeks (C1 1/18/24, C2 2/8/24, Missed C3 d/t sickle cell pain crisis, C4 4/4/24, C5 5/16/24, C6 6/7/24)    - 3/13 No show to onc appt    - 4/9 PET showed interval development of new FDG focus in mid abdomen   - 6/26 OP with Dr. Stoll, discussed with patient and his mother re; treatment options(including Rituxan maintenance) vs, observation.   - due for repeat CT/PET 7/15, will need rescheduled outpatient      # Prophy   - s/p x6 weeks ASA 81mg BID postop, stopped 6/21  - lovenox   - SCDs, encouraged ambulation      # dispo    - full code  - dc home resumed noc O2 pending improvement in pain  - FUV 8/12 Pulm, 8/18 Ortho    I spent >60 minutes in the professional and overall care of this patient.    Assessment and plan as above to be discussed in AM with attending physician Dr. Parnell.      Evelyne Matt, APRN-CNP         [1] lidocaine, 2 patch, transdermal, Daily  oxygen, , inhalation,  Continuous - Inhalation  polyethylene glycol, 17 g, oral, Daily  [2]    [3] PRN medications: diphenhydrAMINE, HYDROmorphone, HYDROmorphone, ondansetron

## 2025-07-15 ENCOUNTER — APPOINTMENT (OUTPATIENT)
Dept: RADIOLOGY | Facility: HOSPITAL | Age: 25
DRG: 811 | End: 2025-07-15
Payer: COMMERCIAL

## 2025-07-15 LAB
1OH-MIDAZOLAM UR CFM-MCNC: <25 NG/ML
6MAM UR CFM-MCNC: <25 NG/ML
7AMINOCLONAZEPAM UR CFM-MCNC: <25 NG/ML
A-OH ALPRAZ UR CFM-MCNC: <25 NG/ML
ABO GROUP (TYPE) IN BLOOD: NORMAL
ALBUMIN SERPL BCP-MCNC: 4.9 G/DL (ref 3.4–5)
ALP SERPL-CCNC: 119 U/L (ref 33–120)
ALPRAZ UR CFM-MCNC: <25 NG/ML
ALT SERPL W P-5'-P-CCNC: 24 U/L (ref 10–52)
ANION GAP SERPL CALC-SCNC: 11 MMOL/L (ref 10–20)
ANTIBODY SCREEN: NORMAL
APTT PPP: 27 SECONDS (ref 26–36)
AST SERPL W P-5'-P-CCNC: 64 U/L (ref 9–39)
BASOPHILS # BLD AUTO: 0.04 X10*3/UL (ref 0–0.1)
BASOPHILS NFR BLD AUTO: 0.2 %
BILIRUB DIRECT SERPL-MCNC: 1.2 MG/DL (ref 0–0.3)
BILIRUB SERPL-MCNC: 14.2 MG/DL (ref 0–1.2)
BUN SERPL-MCNC: 10 MG/DL (ref 6–23)
CA-I BLD-SCNC: 1.17 MMOL/L (ref 1.1–1.33)
CALCIUM SERPL-MCNC: 9.6 MG/DL (ref 8.6–10.6)
CARDIAC TROPONIN I PNL SERPL HS: 6 NG/L (ref 0–53)
CHLORDIAZEP UR CFM-MCNC: <25 NG/ML
CHLORIDE SERPL-SCNC: 104 MMOL/L (ref 98–107)
CLONAZEPAM UR CFM-MCNC: <25 NG/ML
CO2 SERPL-SCNC: 26 MMOL/L (ref 21–32)
CODEINE UR CFM-MCNC: <50 NG/ML
CREAT SERPL-MCNC: 0.64 MG/DL (ref 0.5–1.3)
DIAZEPAM UR CFM-MCNC: <25 NG/ML
EDDP UR CFM-MCNC: <25 NG/ML
EGFRCR SERPLBLD CKD-EPI 2021: >90 ML/MIN/1.73M*2
EOSINOPHIL # BLD AUTO: 0.11 X10*3/UL (ref 0–0.7)
EOSINOPHIL NFR BLD AUTO: 0.7 %
ERYTHROCYTE [DISTWIDTH] IN BLOOD BY AUTOMATED COUNT: 21.7 % (ref 11.5–14.5)
FENTANYL UR CFM-MCNC: <2.5 NG/ML
GLUCOSE SERPL-MCNC: 74 MG/DL (ref 74–99)
HCT VFR BLD AUTO: 21.1 % (ref 41–52)
HGB BLD-MCNC: 7.8 G/DL (ref 13.5–17.5)
HGB RETIC QN: 31 PG (ref 28–38)
HYDROCODONE CTO UR CFM-MCNC: <25 NG/ML
HYDROMORPHONE UR CFM-MCNC: >2500 NG/ML
IMM GRANULOCYTES # BLD AUTO: 0.25 X10*3/UL (ref 0–0.7)
IMM GRANULOCYTES NFR BLD AUTO: 1.5 % (ref 0–0.9)
IMMATURE RETIC FRACTION: 22.5 %
INR PPP: 1.4 (ref 0.9–1.1)
LDH SERPL L TO P-CCNC: 841 U/L (ref 84–246)
LORAZEPAM UR CFM-MCNC: <25 NG/ML
LYMPHOCYTES # BLD AUTO: 2.05 X10*3/UL (ref 1.2–4.8)
LYMPHOCYTES NFR BLD AUTO: 12.4 %
MCH RBC QN AUTO: 31.5 PG (ref 26–34)
MCHC RBC AUTO-ENTMCNC: 37 G/DL (ref 32–36)
MCV RBC AUTO: 85 FL (ref 80–100)
METHADONE UR CFM-MCNC: <25 NG/ML
MIDAZOLAM UR CFM-MCNC: <25 NG/ML
MONOCYTES # BLD AUTO: 2.63 X10*3/UL (ref 0.1–1)
MONOCYTES NFR BLD AUTO: 15.9 %
MORPHINE UR CFM-MCNC: <50 NG/ML
NEUTROPHILS # BLD AUTO: 11.45 X10*3/UL (ref 1.2–7.7)
NEUTROPHILS NFR BLD AUTO: 69.3 %
NORDIAZEPAM UR CFM-MCNC: <25 NG/ML
NORFENTANYL UR CFM-MCNC: <2.5 NG/ML
NORHYDROCODONE UR CFM-MCNC: <25 NG/ML
NOROXYCODONE UR CFM-MCNC: <25 NG/ML
NORTRAMADOL UR-MCNC: <50 NG/ML
NRBC BLD-RTO: 9 /100 WBCS (ref 0–0)
OXAZEPAM UR CFM-MCNC: <25 NG/ML
OXYCODONE UR CFM-MCNC: <25 NG/ML
OXYMORPHONE UR CFM-MCNC: <25 NG/ML
PLATELET # BLD AUTO: 197 X10*3/UL (ref 150–450)
POTASSIUM SERPL-SCNC: 3.9 MMOL/L (ref 3.5–5.3)
PROT SERPL-MCNC: 7.3 G/DL (ref 6.4–8.2)
PROTHROMBIN TIME: 15.3 SECONDS (ref 9.8–12.4)
RBC # BLD AUTO: 2.48 X10*6/UL (ref 4.5–5.9)
RETICS #: 0.72 X10*6/UL (ref 0.02–0.12)
RETICS/RBC NFR AUTO: 29.1 % (ref 0.5–2)
RH FACTOR (ANTIGEN D): NORMAL
SODIUM SERPL-SCNC: 137 MMOL/L (ref 136–145)
TEMAZEPAM UR CFM-MCNC: <25 NG/ML
TRAMADOL UR CFM-MCNC: <50 NG/ML
WBC # BLD AUTO: 16.5 X10*3/UL (ref 4.4–11.3)
ZOLPIDEM UR CFM-MCNC: <25 NG/ML
ZOLPIDEM UR-MCNC: <25 NG/ML

## 2025-07-15 PROCEDURE — 36415 COLL VENOUS BLD VENIPUNCTURE: CPT

## 2025-07-15 PROCEDURE — 86850 RBC ANTIBODY SCREEN: CPT

## 2025-07-15 PROCEDURE — 2780000003 HC OR 278 NO HCPCS

## 2025-07-15 PROCEDURE — P9040 RBC LEUKOREDUCED IRRADIATED: HCPCS

## 2025-07-15 PROCEDURE — 85045 AUTOMATED RETICULOCYTE COUNT: CPT

## 2025-07-15 PROCEDURE — C1751 CATH, INF, PER/CENT/MIDLINE: HCPCS

## 2025-07-15 PROCEDURE — 72132 CT LUMBAR SPINE W/DYE: CPT

## 2025-07-15 PROCEDURE — 86920 COMPATIBILITY TEST SPIN: CPT

## 2025-07-15 PROCEDURE — 82330 ASSAY OF CALCIUM: CPT

## 2025-07-15 PROCEDURE — 83615 LACTATE (LD) (LDH) ENZYME: CPT

## 2025-07-15 PROCEDURE — 71045 X-RAY EXAM CHEST 1 VIEW: CPT | Performed by: RADIOLOGY

## 2025-07-15 PROCEDURE — 2500000005 HC RX 250 GENERAL PHARMACY W/O HCPCS: Performed by: EMERGENCY MEDICINE

## 2025-07-15 PROCEDURE — 84484 ASSAY OF TROPONIN QUANT: CPT

## 2025-07-15 PROCEDURE — 2500000001 HC RX 250 WO HCPCS SELF ADMINISTERED DRUGS (ALT 637 FOR MEDICARE OP)

## 2025-07-15 PROCEDURE — 86902 BLOOD TYPE ANTIGEN DONOR EA: CPT

## 2025-07-15 PROCEDURE — 85610 PROTHROMBIN TIME: CPT

## 2025-07-15 PROCEDURE — 2500000004 HC RX 250 GENERAL PHARMACY W/ HCPCS (ALT 636 FOR OP/ED)

## 2025-07-15 PROCEDURE — 85660 RBC SICKLE CELL TEST: CPT

## 2025-07-15 PROCEDURE — 36410 VNPNXR 3YR/> PHY/QHP DX/THER: CPT

## 2025-07-15 PROCEDURE — 71045 X-RAY EXAM CHEST 1 VIEW: CPT

## 2025-07-15 PROCEDURE — 85025 COMPLETE CBC W/AUTO DIFF WBC: CPT

## 2025-07-15 PROCEDURE — 2550000001 HC RX 255 CONTRASTS: Performed by: INTERNAL MEDICINE

## 2025-07-15 PROCEDURE — 72129 CT CHEST SPINE W/DYE: CPT

## 2025-07-15 PROCEDURE — 36430 TRANSFUSION BLD/BLD COMPNT: CPT

## 2025-07-15 PROCEDURE — 72126 CT NECK SPINE W/DYE: CPT

## 2025-07-15 PROCEDURE — 80053 COMPREHEN METABOLIC PANEL: CPT

## 2025-07-15 PROCEDURE — 99233 SBSQ HOSP IP/OBS HIGH 50: CPT | Performed by: INTERNAL MEDICINE

## 2025-07-15 PROCEDURE — 1170000001 HC PRIVATE ONCOLOGY ROOM DAILY

## 2025-07-15 RX ORDER — DEXTROSE MONOHYDRATE AND SODIUM CHLORIDE 5; .45 G/100ML; G/100ML
75 INJECTION, SOLUTION INTRAVENOUS CONTINUOUS
Status: DISCONTINUED | OUTPATIENT
Start: 2025-07-15 | End: 2025-07-16

## 2025-07-15 RX ORDER — LIDOCAINE HYDROCHLORIDE 10 MG/ML
5 INJECTION, SOLUTION INFILTRATION; PERINEURAL ONCE
Status: DISCONTINUED | OUTPATIENT
Start: 2025-07-15 | End: 2025-07-24 | Stop reason: HOSPADM

## 2025-07-15 RX ORDER — PANTOPRAZOLE SODIUM 40 MG/1
40 TABLET, DELAYED RELEASE ORAL
Status: DISCONTINUED | OUTPATIENT
Start: 2025-07-16 | End: 2025-07-15

## 2025-07-15 RX ORDER — ACETAMINOPHEN 325 MG/1
650 TABLET ORAL ONCE
Status: DISCONTINUED | OUTPATIENT
Start: 2025-07-15 | End: 2025-07-20 | Stop reason: SDUPTHER

## 2025-07-15 RX ORDER — PANTOPRAZOLE SODIUM 40 MG/1
40 TABLET, DELAYED RELEASE ORAL
Status: DISCONTINUED | OUTPATIENT
Start: 2025-07-16 | End: 2025-07-24 | Stop reason: HOSPADM

## 2025-07-15 RX ORDER — KETOROLAC TROMETHAMINE 30 MG/ML
30 INJECTION, SOLUTION INTRAMUSCULAR; INTRAVENOUS EVERY 6 HOURS SCHEDULED
Status: COMPLETED | OUTPATIENT
Start: 2025-07-15 | End: 2025-07-18

## 2025-07-15 RX ORDER — ENOXAPARIN SODIUM 100 MG/ML
40 INJECTION SUBCUTANEOUS DAILY
Status: DISCONTINUED | OUTPATIENT
Start: 2025-07-15 | End: 2025-07-24 | Stop reason: HOSPADM

## 2025-07-15 RX ORDER — DIPHENHYDRAMINE HCL 25 MG
25 CAPSULE ORAL ONCE
Status: DISCONTINUED | OUTPATIENT
Start: 2025-07-15 | End: 2025-07-21

## 2025-07-15 RX ADMIN — KETOROLAC TROMETHAMINE 30 MG: 30 INJECTION, SOLUTION INTRAMUSCULAR; INTRAVENOUS at 17:33

## 2025-07-15 RX ADMIN — HYDROMORPHONE HYDROCHLORIDE 5 MG: 2 INJECTION, SOLUTION INTRAMUSCULAR; INTRAVENOUS; SUBCUTANEOUS at 08:53

## 2025-07-15 RX ADMIN — Medication 2 L/MIN: at 08:52

## 2025-07-15 RX ADMIN — DEXTROSE AND SODIUM CHLORIDE 75 ML/HR: 5; .45 INJECTION, SOLUTION INTRAVENOUS at 12:48

## 2025-07-15 RX ADMIN — KETOROLAC TROMETHAMINE 30 MG: 30 INJECTION, SOLUTION INTRAMUSCULAR; INTRAVENOUS at 09:00

## 2025-07-15 RX ADMIN — HYDROMORPHONE HYDROCHLORIDE 4 MG: 2 INJECTION, SOLUTION INTRAMUSCULAR; INTRAVENOUS; SUBCUTANEOUS at 04:47

## 2025-07-15 RX ADMIN — OXYCODONE HYDROCHLORIDE 30 MG: 20 TABLET, FILM COATED, EXTENDED RELEASE ORAL at 21:42

## 2025-07-15 RX ADMIN — HYDROMORPHONE HYDROCHLORIDE 5 MG: 2 INJECTION, SOLUTION INTRAMUSCULAR; INTRAVENOUS; SUBCUTANEOUS at 22:33

## 2025-07-15 RX ADMIN — HYDROMORPHONE HYDROCHLORIDE 4 MG: 2 INJECTION, SOLUTION INTRAMUSCULAR; INTRAVENOUS; SUBCUTANEOUS at 00:30

## 2025-07-15 RX ADMIN — FOLIC ACID 1 MG: 1 TABLET ORAL at 08:53

## 2025-07-15 RX ADMIN — HYDROMORPHONE HYDROCHLORIDE 5 MG: 2 INJECTION, SOLUTION INTRAMUSCULAR; INTRAVENOUS; SUBCUTANEOUS at 20:16

## 2025-07-15 RX ADMIN — HYDROMORPHONE HYDROCHLORIDE 5 MG: 2 INJECTION, SOLUTION INTRAMUSCULAR; INTRAVENOUS; SUBCUTANEOUS at 17:33

## 2025-07-15 RX ADMIN — HYDROMORPHONE HYDROCHLORIDE 4 MG: 2 INJECTION, SOLUTION INTRAMUSCULAR; INTRAVENOUS; SUBCUTANEOUS at 02:35

## 2025-07-15 RX ADMIN — ONDANSETRON HYDROCHLORIDE 8 MG: 8 TABLET, FILM COATED ORAL at 21:43

## 2025-07-15 RX ADMIN — HYDROMORPHONE HYDROCHLORIDE 5 MG: 2 INJECTION, SOLUTION INTRAMUSCULAR; INTRAVENOUS; SUBCUTANEOUS at 13:30

## 2025-07-15 RX ADMIN — HYDROMORPHONE HYDROCHLORIDE 4 MG: 2 INJECTION, SOLUTION INTRAMUSCULAR; INTRAVENOUS; SUBCUTANEOUS at 06:47

## 2025-07-15 RX ADMIN — KETOROLAC TROMETHAMINE 30 MG: 30 INJECTION, SOLUTION INTRAMUSCULAR; INTRAVENOUS at 23:27

## 2025-07-15 RX ADMIN — OXYCODONE HYDROCHLORIDE 30 MG: 20 TABLET, FILM COATED, EXTENDED RELEASE ORAL at 08:53

## 2025-07-15 RX ADMIN — POLYETHYLENE GLYCOL 3350 17 G: 17 POWDER, FOR SOLUTION ORAL at 08:53

## 2025-07-15 RX ADMIN — IOHEXOL 124 ML: 350 INJECTION, SOLUTION INTRAVENOUS at 10:40

## 2025-07-15 RX ADMIN — Medication 21 PERCENT: at 20:20

## 2025-07-15 RX ADMIN — HYDROMORPHONE HYDROCHLORIDE 5 MG: 2 INJECTION, SOLUTION INTRAMUSCULAR; INTRAVENOUS; SUBCUTANEOUS at 11:25

## 2025-07-15 ASSESSMENT — COGNITIVE AND FUNCTIONAL STATUS - GENERAL
DAILY ACTIVITIY SCORE: 24
MOBILITY SCORE: 24

## 2025-07-15 ASSESSMENT — PAIN SCALES - GENERAL
PAINLEVEL_OUTOF10: 10 - WORST POSSIBLE PAIN
PAINLEVEL_OUTOF10: 10 - WORST POSSIBLE PAIN
PAINLEVEL_OUTOF10: 9
PAINLEVEL_OUTOF10: 10 - WORST POSSIBLE PAIN
PAINLEVEL_OUTOF10: 9
PAINLEVEL_OUTOF10: 8
PAINLEVEL_OUTOF10: 9
PAINLEVEL_OUTOF10: 10 - WORST POSSIBLE PAIN
PAINLEVEL_OUTOF10: 9
PAINLEVEL_OUTOF10: 9
PAINLEVEL_OUTOF10: 10 - WORST POSSIBLE PAIN

## 2025-07-15 ASSESSMENT — PAIN - FUNCTIONAL ASSESSMENT

## 2025-07-15 ASSESSMENT — PAIN DESCRIPTION - LOCATION
LOCATION: BACK

## 2025-07-15 NOTE — PROGRESS NOTES
"Pharmacy Medication History Review    Joellen Narayan is a 24 y.o. male admitted for Sickle cell pain crisis (Multi). Pharmacy reviewed the patient's [id number] medications and allergies for accuracy.    Medications ADDED:  None  Medications CHANGED:  None  Medications REMOVED:   Heparin Lock Flush injection, NS 0.9% flush injection     The list below reflects the updated PTA list.   Prior to Admission Medications   Prescriptions Last Dose Informant   folic acid (Folvite) 1 mg tablet Not Taking Self   Sig: Take 1 tablet (1 mg) by mouth once daily.   Patient not taking: Reported on 7/15/2025   oxygen (O2) gas therapy  Self   Sig: Inhale 1 each (1 L) once daily at bedtime.      Facility-Administered Medications: None        The list below reflects the updated allergy list. Please review each documented allergy for additional clarification and justification.  Allergies  Reviewed by Rose Rush RN on 7/14/2025        Severity Reactions Comments    Cefepime Medium Hallucinations     Amoxicillin Low Hives     Ceftriaxone Low Hives, Rash             Patient accepts M2B at discharge.     Sources:   [facility]  Pharmacy dispense history  Patient Interview Good historian  Chart Review  Care Everywhere    Additional Comments:  Patient reported he was not taking any prescriptions medications, vitamins, supplements or OTCs PTA      Toney Trent, Cathy  Transitions of Care Pharmacist  07/15/25     Secure Chat preferred   If no response call [phone] or [email or web address] \"Med Rec\"    "

## 2025-07-15 NOTE — PROGRESS NOTES
07/15/25 0900   Discharge Planning   Living Arrangements Parent   Support Systems Parent   Type of Residence Private residence   Home or Post Acute Services In home services   Type of Home Care Services DME or oxygen   Expected Discharge Disposition Home   Does the patient need discharge transport arranged? Yes   [facility] coordination needed? Yes   Has discharge transport been arranged? No   Patient Choice   Patient / Family choosing to utilize agency / facility established prior to hospitalization Yes  (active with [facility] for home O2)     Care Transitions Note  07/15/25  Plan per Medical/Surgical Team: hx of Hodgkins lymphoma and sickle cell   Status: Inpatient  Payor Source: [place] [employer] [place]  Discharge disposition:  Home resumed O2  Expected date of discharge: Thurs Fri  Barriers: pain control, symptom mgmt  PCP / Primary Oncologist: Anim    Patient admitted for pain control of sickle cell crisis. Plan to return home at discharge where he lives with his mother. He is independent in ADLS, no Dme at home. Has 3L nocturnal O2 through [facility]. No anticipated dc needs at this time. SW to remain available.   Lauren Simpson, MSW, LSW

## 2025-07-15 NOTE — PROGRESS NOTES
"Joellen Narayan is a 24 y.o. male on day 1 of admission presenting with Sickle cell pain crisis (Multi).    Subjective   Pt seen at bedside, appears very uncomfortable, unable to comfortably sit in chair and grimacing. Reports no longer has chest pain but significant back pain. Discussed increasing dose vs PCA vs trialing morphine instead. Pt feels increased dose may help. Will obtain imaging of back in lieu of recent hx and worsening pain. We will pain reassess in afternoon.     Endorses nausea/vomiting yesterday night and this morning. Denies fever, chills, SOB, bleeding, edema, headaches, falls.        Objective     Physical Exam  HENT:      Head: Normocephalic.      Right Ear: External ear normal.      Left Ear: External ear normal.      Nose: Nose normal.   Eyes:      Extraocular Movements: Extraocular movements intact.      Conjunctiva/sclera: Conjunctivae normal.   Cardiovascular:      Rate and Rhythm: Normal rate and regular rhythm.   Pulmonary:      Effort: Pulmonary effort is normal.      Breath sounds: Normal breath sounds.   Abdominal:      General: Abdomen is flat. Bowel sounds are normal.      Palpations: Abdomen is soft.   Musculoskeletal:         General: Normal range of motion.      Cervical back: Normal range of motion and neck supple.   Skin:     General: Skin is warm and dry.   Neurological:      Mental Status: He is alert and oriented to person, place, and time. Mental status is at baseline.   Psychiatric:         Mood and Affect: Mood normal.         Behavior: Behavior normal.         Thought Content: Thought content normal.         Judgment: Judgment normal.         Last Recorded Vitals  Blood pressure 139/67, pulse 95, temperature 37.2 °C (99 °F), temperature source Temporal, resp. rate 18, height 1.88 m (6' 2.02\"), weight 70.3 kg (155 lb), SpO2 92%.  Intake/Output last 3 Shifts:  No intake/output data recorded.    Relevant Results   .Scheduled medications  Scheduled " Medications[1]  Continuous medications  Continuous Medications[2]  PRN medications  PRN Medications[3]    .  Results for orders placed or performed during the hospital encounter of 07/14/25 (from the past 24 hours)   ECG 12 lead   Result Value Ref Range    Ventricular Rate 68 BPM    Atrial Rate 68 BPM    ID Interval 186 ms    QRS Duration 104 ms    QT Interval 374 ms    QTC Calculation(Bazett) 397 ms    P Axis 60 degrees    R Axis 76 degrees    T Axis 28 degrees    QRS Count 11 beats    Q Onset 214 ms    P Onset 121 ms    P Offset 169 ms    T Offset 401 ms    QTC Fredericia 390 ms   CBC and Auto Differential   Result Value Ref Range    WBC 15.5 (H) 4.4 - 11.3 x10*3/uL    nRBC 3.8 (H) 0.0 - 0.0 /100 WBCs    RBC 2.70 (L) 4.50 - 5.90 x10*6/uL    Hemoglobin 8.5 (L) 13.5 - 17.5 g/dL    Hematocrit 23.0 (L) 41.0 - 52.0 %    MCV 85 80 - 100 fL    MCH 31.5 26.0 - 34.0 pg    MCHC 37.0 (H) 32.0 - 36.0 g/dL    RDW 21.4 (H) 11.5 - 14.5 %    Platelets 275 150 - 450 x10*3/uL    Immature Granulocytes %, Automated 5.8 (H) 0.0 - 0.9 %    Immature Granulocytes Absolute, Automated 0.90 (H) 0.00 - 0.70 x10*3/uL   Comprehensive metabolic panel   Result Value Ref Range    Glucose 86 74 - 99 mg/dL    Sodium 139 136 - 145 mmol/L    Potassium 3.9 3.5 - 5.3 mmol/L    Chloride 106 98 - 107 mmol/L    Bicarbonate 24 21 - 32 mmol/L    Anion Gap 13 10 - 20 mmol/L    Urea Nitrogen 11 6 - 23 mg/dL    Creatinine 0.62 0.50 - 1.30 mg/dL    eGFR >90 >60 mL/min/1.73m*2    Calcium 9.7 8.6 - 10.6 mg/dL    Albumin 4.7 3.4 - 5.0 g/dL    Alkaline Phosphatase 113 33 - 120 U/L    Total Protein 7.1 6.4 - 8.2 g/dL    AST 50 (H) 9 - 39 U/L    Bilirubin, Total 10.4 (H) 0.0 - 1.2 mg/dL    ALT 26 10 - 52 U/L   Reticulocytes   Result Value Ref Range    Retic % 28.4 (H) 0.5 - 2.0 %    Retic Absolute 0.765 (H) 0.022 - 0.118 x10*6/uL    Reticulocyte Hemoglobin 32 28 - 38 pg    Immature Retic fraction 21.9 (H) <=16.0 %   LDH, Lactate dehydrogenase   Result Value Ref Range      (H) 84 - 246 U/L   Manual Differential   Result Value Ref Range    Neutrophils %, Manual 74.3 40.0 - 80.0 %    Bands %, Manual 0.0 0.0 - 5.0 %    Lymphocytes %, Manual 18.6 13.0 - 44.0 %    Monocytes %, Manual 3.5 2.0 - 10.0 %    Eosinophils %, Manual 1.8 0.0 - 6.0 %    Basophils %, Manual 0.9 0.0 - 2.0 %    Atypical Lymphocytes %, Manual 0.0 0.0 - 2.0 %    Metamyelocytes %, Manual 0.9 0.0 - 0.0 %    Myelocytes %, Manual 0.0 0.0 - 0.0 %    Plasma Cells %, Manual 0.0 0.00 - 0.00 %    Promyelocytes %, Manual 0.0 0.0 - 0.0 %    Blasts %, Manual 0.0 0.0 - 0.0 %    Seg Neutrophils Absolute, Manual 11.52 (H) 1.20 - 7.00 x10*3/uL    Bands Absolute, Manual 0.00 0.00 - 0.70 x10*3/uL    Lymphocytes Absolute, Manual 2.88 1.20 - 4.80 x10*3/uL    Monocytes Absolute, Manual 0.54 0.10 - 1.00 x10*3/uL    Eosinophils Absolute, Manual 0.28 0.00 - 0.70 x10*3/uL    Basophils Absolute, Manual 0.14 (H) 0.00 - 0.10 x10*3/uL    Atypical Lymphs Absolute, Manual 0.00 0.00 - 0.50 x10*3/uL    Metamyelocytes Absolute, Manual 0.14 0.00 - 0.00 x10*3/uL    Myelocytes Absolute, Manual 0.00 0.00 - 0.00 x10*3/uL    Plasma Cells Absolute, Manual 0.00 0.00 - 0.00 x10*3/uL    Promyelocytes Absolute, Manual 0.00 0.00 - 0.00 x10*3/uL    Blasts Absolute, Manual 0.00 0.00 - 0.00 x10*3/uL    Total Cells Counted 113     Neutrophils Absolute, Manual 11.52 (H) 1.20 - 7.70 x10*3/uL    Manual nRBC per 100 Cells 0.0 0.0 - 0.0 %    RBC Morphology See Below     Polychromasia Mild     Sickle Cells Many     Target Cells Few    Screen Opiate/Opioid/Benzo Prescription Compliance   Result Value Ref Range    Creatinine, Urine Random 101.4 20.0 - 370.0 mg/dL    Amphetamine Screen, Urine Presumptive Negative Presumptive Negative    Barbiturate Screen, Urine Presumptive Negative Presumptive Negative    Cannabinoid Screen, Urine Presumptive Positive (A) Presumptive Negative    Cocaine Metabolite Screen, Urine Presumptive Negative Presumptive Negative    PCP Screen,  Urine Presumptive Negative Presumptive Negative     *Note: Due to a large number of results and/or encounters for the requested time period, some results have not been displayed. A complete set of results can be found in Results Review.           Assessment & Plan  Sickle cell pain crisis (Multi)    Joellen Narayan is a 24 y.o. 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 Cxr w/o evidence of acute process 7/14 and 7/15. EKG WNL. Hemolysis labs significantly elevated 7/15 and pain worsening reflecting vaso-occlusive crisis. Plan x1u pRBC 7/15. 7/15 CT Spine w/o acute process. Started on IV dilaudid for pain control. DC home resumed nox O2 pending improvement in pain.      # Hgb SS disease acute on chronic pain   - OARRS reviewed on admission, no aberrancies noted (last filled 5/22 oxy ER qty 28 tabs, qty 14 days, and 5/16 oxy IR qty 28 tabs 7 days)  - No carepath available   - Primary hematologist: Dr. Cruz - saw at bedside 7/14   - pt reports he has no home oxycodone at home and has been calling refill line and pharmacy without success or hearing back   - 7/14 and 7/15 CXR w/o evidence of acute process  - 7/15 CT Spine w/o acute process   - Hemolysis labs significantly above baseline reflecting a vaso-occlusive crisis  - Hg 7.8 (baseline ~7-8), d/w Dr. Cruz plan for x1u pRBC to aide I/s/o crisis and worsening pain   - started 4mg IVP dilaudid q2 PRN severe pain (7/15- 7/16), increased to 5mg (7/15), offered PCA but pt declined 7/15   - started toradol 30mg q6 sched x3 days  with PPI support   - re-started home oxycontin ER 30 mg BID on admit   - EKG in ED showed sinus rhythm, no ST elevation or ischemic changes, trop pending   - c/w home folic acid 1mg daily   - Hg S 75% (7/14)  - utox + MJ 7/14)   - supportive care: lidocaine patches, hot packs   - bowel regimen for opioid induced constipation, zofran for opioid induced nausea, and benadrly for opioid induced pruritis      # Hx recent Septic Arthritis  R elbow   - Ortho surgeon: Dr. Tello   - 5/9 MRI w/anesthesia R radius/humerus showing concern for septic arthritis, osteomyelitis, myositis, severe muscle and subcutaneous soft tissue edema, and possible cellulitis   - 5/10 OR s/p R elbow I&D with ortho    - s/p IV daptomycin 6mg/kg q24h and ertapenem 1g q24h until 6/20/25 per ID  - 6/9 Most recent Ortho visit, sutures removed, cont to work on ROM, pt declining PT, fuv in 6-8 weeks for xrays of R elbow, upcoming 8/18      # Hx of nocturnal oxygen dependence   - On 2-3L at night, however patient reports daytime hypoxia as well in the past, currently on 2L      # Hx choledocholithiasis 7/2024  # hyperbilirubinemia   - Worsening hemolysis in setting of vaso-occlusive crisis could be contributing to increased hyperbilirubinemia    - no abdominal pain, afebrile, leukocytosis increased from baseline and nausea/vomiting x2 days  - July 2024 s/p ERCP with biliary sphincterotomy where sludge and stones were removed, achieving complete clearance    - 1/31/25 was planned for cholecystectomy with Dr. Dove but no show on day of surgery and again 2/13 and 2/27    - Tbili uptrending (7/15) 14, direct bili pending 7/15  - started D5 1/2NS @75ml/h x24h (7/15-16)     # Hx Priapism   - No active issues on admit    - Hx previously drained by urology    - Continue home Sudafed 30mg daily PRN priapism     # Nodular lymphocyte predominant Hodgkins lymphoma (NLPHL)     - Follows with Dr. Stoll   - s/p Rituxan and prednisone q3 weeks (C1 1/18/24, C2  2/8/24, Missed C3 d/t sickle cell pain crisis, C4 4/4/24, C5 5/16/24, C6 6/7/24)    - 3/13 No show to onc appt    - 4/9 PET showed interval development of new FDG focus in mid abdomen c/w recurrence   - 6/26 OP with Dr. Stoll, discussed with patient and his mother re; treatment options (including Rituxan maintenance) vs observation, pt deciding options   - due for repeat CT/PET 7/15, will need rescheduled outpatient      # Prophy   - s/p x6 weeks ASA 81mg BID postop, stopped 6/21  - lovenox   - SCDs, encouraged ambulation       # dispo    - Full code  - DC home resumed noc O2 pending improvement in pain  - FUV 8/12 Pulm, 8/18 Ortho     I spent >90 minutes in the professional and overall care of this patient.       Evelyne Matt, APRN-CNP           [1] folic acid, 1 mg, oral, Daily  lidocaine, 2 patch, transdermal, Daily  oxyCODONE ER, 30 mg, oral, q12h REYNALDO  oxygen, , inhalation, Continuous - Inhalation  polyethylene glycol, 17 g, oral, Daily  [2]    [3] PRN medications: diphenhydrAMINE, HYDROmorphone, ondansetron, sennosides-docusate sodium **OR** polyethylene glycol

## 2025-07-15 NOTE — CARE PLAN
The clinical goals for the shift include patient will rate pain at tolerable level by end of shift      Problem: Pain - Adult  Goal: Verbalizes/displays adequate comfort level or baseline comfort level  Outcome: Progressing     Problem: Safety - Adult  Goal: Free from fall injury  Outcome: Progressing     Problem: Discharge Planning  Goal: Discharge to home or other facility with appropriate resources  Outcome: Progressing     Problem: Chronic Conditions and Co-morbidities  Goal: Patient's chronic conditions and co-morbidity symptoms are monitored and maintained or improved  Outcome: Progressing     Problem: Nutrition  Goal: Nutrient intake appropriate for maintaining nutritional needs  Outcome: Progressing

## 2025-07-15 NOTE — PROGRESS NOTES
Pharmacy Admission Order Reconciliation Review    Joellen Narayan is a 24 y.o. male admitted for Sickle cell pain crisis (Multi). Pharmacy reviewed the patient's unreconciled admission medications.    Prior to admission medications that were reviewed and acted on by the pharmacist include:  Oxygen  Pseudoephedrine   These medications have been reconciled.     Any other unreconcilied medications have been addressed and will be ordered or held by the patient's medical team. Medications addressed by the pharmacist may be added or changed by the patient's medical team at any time.    Atul Huffman, Cathy  [facility]  [id number]  [facility], Room# 5999  [place] [id number]  Phone: [phone]  Please reach out via Secure Chat for questions, or if no response call [employer] or [facility]

## 2025-07-15 NOTE — POST-PROCEDURE NOTE
MIDLINE PLACEMENT        Placed:  7/15/60822/15/2025.  [id number].  Earliest Known Present:  7/15/26113/15/2025.  Hand Hygiene Completed:  YesYes.  Catheter Time Out Checklist Completed:  YesYes.  Size (Fr):  33.  Lumen Type:  Single lumenSingle lumen.  Catheter to Vein Ratio Less Than 45%:  YesYes.  Total Length (cm):  1414.  External Length (cm):  00.  Orientation:  LeftLeft.  Location:  Brachial veinBrachial vein.  Site Prep:  Chlorhexidine ; Usual sterile procedure followedChlorhexidine ; Usual sterile procedure followed.  Local Anesthetic:  InjectableInjectable.  Indication:  Parenteral medicationsParenteral medications. Has comment.  Insertion Team Members In The Room:  Fariba. Has comment.  Initial Extremity Circumference (cm):  2727.  Placed by:  JONATHAN SANON RN-ESEQUIEL SANON RN-RAMONITA.  Insertion attempts:  11.  Patient Tolerance:  Tolerated wellTolerated well.  Comfort Measures:  Subcutaneous anestheticSubcutaneous anesthetic.  Procedure Location:  BedsideBedside. Has comment.  Safety Measures:  Patient specific safety measures addressed with RNPatient specific safety measures addressed with RN.  Estimated Blood Loss (mL):  00.  Vessel Fully Compressible Proximally and Distally to Insertion Site:  YesYes.  Catheter Tip Location:  Peripheral/midlinePeripheral/midline. Has comment.  Line Confirmation:  Blood return; Non-pulsatile blood flowBlood return; Non-pulsatile blood flow.  Lot #:  [id number].  :  BDBD.  Expiration Date:  8/31/20268/31/2026.  Securement Method:  Skin barrier; Stat lock; Transparent dressingSkin barrier; Stat lock; Transparent dressing.  Post Procedure Checklist:  Handoff with RN; Bed at lowest level and wheels locked; Obtain all new IV tubing prior to useHandoff with RN; Bed at lowest level and wheels locked; Obtain all new IV tubing prior to use.

## 2025-07-16 ENCOUNTER — APPOINTMENT (OUTPATIENT)
Dept: RADIOLOGY | Facility: HOSPITAL | Age: 25
DRG: 811 | End: 2025-07-16
Payer: COMMERCIAL

## 2025-07-16 LAB
ALBUMIN SERPL BCP-MCNC: 4.5 G/DL (ref 3.4–5)
ALP SERPL-CCNC: 129 U/L (ref 33–120)
ALT SERPL W P-5'-P-CCNC: 22 U/L (ref 10–52)
ANION GAP SERPL CALC-SCNC: 12 MMOL/L (ref 10–20)
AST SERPL W P-5'-P-CCNC: 53 U/L (ref 9–39)
ATRIAL RATE: 75 BPM
BASOPHILS # BLD AUTO: 0.04 X10*3/UL (ref 0–0.1)
BASOPHILS NFR BLD AUTO: 0.3 %
BILIRUB DIRECT SERPL-MCNC: 8.4 MG/DL (ref 0–0.3)
BILIRUB SERPL-MCNC: 27 MG/DL (ref 0–1.2)
BLOOD EXPIRATION DATE: NORMAL
BUN SERPL-MCNC: 9 MG/DL (ref 6–23)
CALCIUM SERPL-MCNC: 9.5 MG/DL (ref 8.6–10.6)
CHLORIDE SERPL-SCNC: 100 MMOL/L (ref 98–107)
CO2 SERPL-SCNC: 28 MMOL/L (ref 21–32)
CREAT SERPL-MCNC: 0.77 MG/DL (ref 0.5–1.3)
DISPENSE STATUS: NORMAL
EGFRCR SERPLBLD CKD-EPI 2021: >90 ML/MIN/1.73M*2
EOSINOPHIL # BLD AUTO: 0.08 X10*3/UL (ref 0–0.7)
EOSINOPHIL NFR BLD AUTO: 0.5 %
ERYTHROCYTE [DISTWIDTH] IN BLOOD BY AUTOMATED COUNT: 22.5 % (ref 11.5–14.5)
GLUCOSE SERPL-MCNC: 84 MG/DL (ref 74–99)
HCT VFR BLD AUTO: 22.7 % (ref 41–52)
HEMOGLOBIN A2: 4.1 % (ref 2–3.5)
HEMOGLOBIN A: 17.2 % (ref 95.8–98)
HEMOGLOBIN F: 2.8 % (ref 0–2)
HEMOGLOBIN IDENTIFICATION INTERPRETATION: ABNORMAL
HEMOGLOBIN S: 75.9 %
HGB BLD-MCNC: 8.2 G/DL (ref 13.5–17.5)
HGB RETIC QN: 31 PG (ref 28–38)
IMM GRANULOCYTES # BLD AUTO: 0.22 X10*3/UL (ref 0–0.7)
IMM GRANULOCYTES NFR BLD AUTO: 1.4 % (ref 0–0.9)
IMMATURE RETIC FRACTION: 29.3 %
LDH SERPL L TO P-CCNC: 846 U/L (ref 84–246)
LYMPHOCYTES # BLD AUTO: 1.88 X10*3/UL (ref 1.2–4.8)
LYMPHOCYTES NFR BLD AUTO: 12.1 %
MCH RBC QN AUTO: 31.3 PG (ref 26–34)
MCHC RBC AUTO-ENTMCNC: 36.1 G/DL (ref 32–36)
MCV RBC AUTO: 87 FL (ref 80–100)
MONOCYTES # BLD AUTO: 2 X10*3/UL (ref 0.1–1)
MONOCYTES NFR BLD AUTO: 12.9 %
NEUTROPHILS # BLD AUTO: 11.3 X10*3/UL (ref 1.2–7.7)
NEUTROPHILS NFR BLD AUTO: 72.8 %
NRBC BLD-RTO: 11.1 /100 WBCS (ref 0–0)
P AXIS: 256 DEGREES
P OFFSET: 202 MS
P ONSET: 141 MS
PATH REVIEW-HGB IDENTIFICATION: ABNORMAL
PLATELET # BLD AUTO: 249 X10*3/UL (ref 150–450)
POTASSIUM SERPL-SCNC: 3.5 MMOL/L (ref 3.5–5.3)
PR INTERVAL: 154 MS
PRODUCT BLOOD TYPE: 1700
PRODUCT CODE: NORMAL
PROT SERPL-MCNC: 6.4 G/DL (ref 6.4–8.2)
Q ONSET: 218 MS
QRS COUNT: 13 BEATS
QRS DURATION: 102 MS
QT INTERVAL: 370 MS
QTC CALCULATION(BAZETT): 413 MS
QTC FREDERICIA: 398 MS
R AXIS: 46 DEGREES
RBC # BLD AUTO: 2.62 X10*6/UL (ref 4.5–5.9)
RETICS #: 0.78 X10*6/UL (ref 0.02–0.12)
RETICS/RBC NFR AUTO: 29.6 % (ref 0.5–2)
SODIUM SERPL-SCNC: 136 MMOL/L (ref 136–145)
T AXIS: -18 DEGREES
T OFFSET: 403 MS
UNIT ABO: NORMAL
UNIT NUMBER: NORMAL
UNIT RH: NORMAL
UNIT VOLUME: 350
VENTRICULAR RATE: 75 BPM
WBC # BLD AUTO: 15.5 X10*3/UL (ref 4.4–11.3)
XM INTEP: NORMAL

## 2025-07-16 PROCEDURE — 80053 COMPREHEN METABOLIC PANEL: CPT

## 2025-07-16 PROCEDURE — 2500000004 HC RX 250 GENERAL PHARMACY W/ HCPCS (ALT 636 FOR OP/ED)

## 2025-07-16 PROCEDURE — 1170000001 HC PRIVATE ONCOLOGY ROOM DAILY

## 2025-07-16 PROCEDURE — 2500000004 HC RX 250 GENERAL PHARMACY W/ HCPCS (ALT 636 FOR OP/ED): Mod: JW,TB

## 2025-07-16 PROCEDURE — 71275 CT ANGIOGRAPHY CHEST: CPT | Performed by: RADIOLOGY

## 2025-07-16 PROCEDURE — 85045 AUTOMATED RETICULOCYTE COUNT: CPT

## 2025-07-16 PROCEDURE — 99232 SBSQ HOSP IP/OBS MODERATE 35: CPT

## 2025-07-16 PROCEDURE — 85025 COMPLETE CBC W/AUTO DIFF WBC: CPT

## 2025-07-16 PROCEDURE — 2500000005 HC RX 250 GENERAL PHARMACY W/O HCPCS: Performed by: EMERGENCY MEDICINE

## 2025-07-16 PROCEDURE — 71275 CT ANGIOGRAPHY CHEST: CPT

## 2025-07-16 PROCEDURE — 82248 BILIRUBIN DIRECT: CPT

## 2025-07-16 PROCEDURE — 2550000001 HC RX 255 CONTRASTS: Performed by: INTERNAL MEDICINE

## 2025-07-16 PROCEDURE — 2500000001 HC RX 250 WO HCPCS SELF ADMINISTERED DRUGS (ALT 637 FOR MEDICARE OP)

## 2025-07-16 PROCEDURE — 93010 ELECTROCARDIOGRAM REPORT: CPT | Performed by: INTERNAL MEDICINE

## 2025-07-16 PROCEDURE — 83615 LACTATE (LD) (LDH) ENZYME: CPT

## 2025-07-16 RX ORDER — LEVOFLOXACIN 5 MG/ML
750 INJECTION, SOLUTION INTRAVENOUS EVERY 24 HOURS
Status: DISCONTINUED | OUTPATIENT
Start: 2025-07-16 | End: 2025-07-21

## 2025-07-16 RX ORDER — DEXTROSE MONOHYDRATE AND SODIUM CHLORIDE 5; .45 G/100ML; G/100ML
100 INJECTION, SOLUTION INTRAVENOUS CONTINUOUS
Status: ACTIVE | OUTPATIENT
Start: 2025-07-16 | End: 2025-07-17

## 2025-07-16 RX ORDER — CYCLOBENZAPRINE HCL 10 MG
10 TABLET ORAL ONCE
Status: COMPLETED | OUTPATIENT
Start: 2025-07-16 | End: 2025-07-16

## 2025-07-16 RX ORDER — PROCHLORPERAZINE EDISYLATE 5 MG/ML
10 INJECTION INTRAMUSCULAR; INTRAVENOUS ONCE
Status: COMPLETED | OUTPATIENT
Start: 2025-07-16 | End: 2025-07-16

## 2025-07-16 RX ADMIN — LEVOFLOXACIN 750 MG: 5 INJECTION, SOLUTION INTRAVENOUS at 12:29

## 2025-07-16 RX ADMIN — HYDROMORPHONE HYDROCHLORIDE 5 MG: 2 INJECTION, SOLUTION INTRAMUSCULAR; INTRAVENOUS; SUBCUTANEOUS at 18:08

## 2025-07-16 RX ADMIN — KETOROLAC TROMETHAMINE 30 MG: 30 INJECTION, SOLUTION INTRAMUSCULAR; INTRAVENOUS at 07:08

## 2025-07-16 RX ADMIN — CYCLOBENZAPRINE 10 MG: 10 TABLET, FILM COATED ORAL at 22:23

## 2025-07-16 RX ADMIN — KETOROLAC TROMETHAMINE 30 MG: 30 INJECTION, SOLUTION INTRAMUSCULAR; INTRAVENOUS at 11:41

## 2025-07-16 RX ADMIN — HYDROMORPHONE HYDROCHLORIDE 5 MG: 2 INJECTION, SOLUTION INTRAMUSCULAR; INTRAVENOUS; SUBCUTANEOUS at 05:01

## 2025-07-16 RX ADMIN — ONDANSETRON HYDROCHLORIDE 8 MG: 8 TABLET, FILM COATED ORAL at 22:23

## 2025-07-16 RX ADMIN — IOHEXOL 55 ML: 350 INJECTION, SOLUTION INTRAVENOUS at 10:58

## 2025-07-16 RX ADMIN — PANTOPRAZOLE SODIUM 40 MG: 40 TABLET, DELAYED RELEASE ORAL at 07:08

## 2025-07-16 RX ADMIN — POLYETHYLENE GLYCOL 3350 17 G: 17 POWDER, FOR SOLUTION ORAL at 08:20

## 2025-07-16 RX ADMIN — KETOROLAC TROMETHAMINE 30 MG: 30 INJECTION, SOLUTION INTRAMUSCULAR; INTRAVENOUS at 18:07

## 2025-07-16 RX ADMIN — HYDROMORPHONE HYDROCHLORIDE 5 MG: 2 INJECTION, SOLUTION INTRAMUSCULAR; INTRAVENOUS; SUBCUTANEOUS at 16:15

## 2025-07-16 RX ADMIN — HYDROMORPHONE HYDROCHLORIDE 5 MG: 2 INJECTION, SOLUTION INTRAMUSCULAR; INTRAVENOUS; SUBCUTANEOUS at 20:20

## 2025-07-16 RX ADMIN — OXYCODONE HYDROCHLORIDE 30 MG: 20 TABLET, FILM COATED, EXTENDED RELEASE ORAL at 20:21

## 2025-07-16 RX ADMIN — DEXTROSE AND SODIUM CHLORIDE 100 ML/HR: 5; .45 INJECTION, SOLUTION INTRAVENOUS at 09:23

## 2025-07-16 RX ADMIN — HYDROMORPHONE HYDROCHLORIDE 5 MG: 2 INJECTION, SOLUTION INTRAMUSCULAR; INTRAVENOUS; SUBCUTANEOUS at 00:50

## 2025-07-16 RX ADMIN — FOLIC ACID 1 MG: 1 TABLET ORAL at 08:20

## 2025-07-16 RX ADMIN — PROCHLORPERAZINE EDISYLATE 10 MG: 5 INJECTION INTRAMUSCULAR; INTRAVENOUS at 00:51

## 2025-07-16 RX ADMIN — HYDROMORPHONE HYDROCHLORIDE 5 MG: 2 INJECTION, SOLUTION INTRAMUSCULAR; INTRAVENOUS; SUBCUTANEOUS at 09:22

## 2025-07-16 RX ADMIN — Medication 2 L/MIN: at 20:25

## 2025-07-16 RX ADMIN — HYDROMORPHONE HYDROCHLORIDE 5 MG: 2 INJECTION, SOLUTION INTRAMUSCULAR; INTRAVENOUS; SUBCUTANEOUS at 22:23

## 2025-07-16 RX ADMIN — HYDROMORPHONE HYDROCHLORIDE 5 MG: 2 INJECTION, SOLUTION INTRAMUSCULAR; INTRAVENOUS; SUBCUTANEOUS at 07:08

## 2025-07-16 RX ADMIN — Medication 2 L/MIN: at 08:17

## 2025-07-16 RX ADMIN — HYDROMORPHONE HYDROCHLORIDE 5 MG: 2 INJECTION, SOLUTION INTRAMUSCULAR; INTRAVENOUS; SUBCUTANEOUS at 13:49

## 2025-07-16 RX ADMIN — HYDROMORPHONE HYDROCHLORIDE 5 MG: 2 INJECTION, SOLUTION INTRAMUSCULAR; INTRAVENOUS; SUBCUTANEOUS at 11:41

## 2025-07-16 RX ADMIN — OXYCODONE HYDROCHLORIDE 30 MG: 20 TABLET, FILM COATED, EXTENDED RELEASE ORAL at 08:20

## 2025-07-16 RX ADMIN — DIPHENHYDRAMINE HYDROCHLORIDE 25 MG: 25 CAPSULE ORAL at 22:23

## 2025-07-16 ASSESSMENT — PAIN - FUNCTIONAL ASSESSMENT
PAIN_FUNCTIONAL_ASSESSMENT: 0-10

## 2025-07-16 ASSESSMENT — PAIN SCALES - GENERAL
PAINLEVEL_OUTOF10: 10 - WORST POSSIBLE PAIN
PAINLEVEL_OUTOF10: 9
PAINLEVEL_OUTOF10: 10 - WORST POSSIBLE PAIN
PAINLEVEL_OUTOF10: 9
PAINLEVEL_OUTOF10: 10 - WORST POSSIBLE PAIN
PAINLEVEL_OUTOF10: 10 - WORST POSSIBLE PAIN
PAINLEVEL_OUTOF10: 9
PAINLEVEL_OUTOF10: 9
PAINLEVEL_OUTOF10: 10 - WORST POSSIBLE PAIN

## 2025-07-16 NOTE — CARE PLAN
The patient's goals for the shift include      The clinical goals for the shift include pt will remain free fo injury      Problem: Pain - Adult  Goal: Verbalizes/displays adequate comfort level or baseline comfort level  Outcome: Progressing     Problem: Safety - Adult  Goal: Free from fall injury  Outcome: Progressing     Problem: Discharge Planning  Goal: Discharge to home or other facility with appropriate resources  Outcome: Progressing     Problem: Chronic Conditions and Co-morbidities  Goal: Patient's chronic conditions and co-morbidity symptoms are monitored and maintained or improved  Outcome: Progressing     Problem: Nutrition  Goal: Nutrient intake appropriate for maintaining nutritional needs  Outcome: Progressing     Problem: Pain  Goal: Takes deep breaths with improved pain control throughout the shift  Outcome: Progressing  Goal: Turns in bed with improved pain control throughout the shift  Outcome: Progressing  Goal: Walks with improved pain control throughout the shift  Outcome: Progressing  Goal: Performs ADL's with improved pain control throughout shift  Outcome: Progressing  Goal: Participates in PT with improved pain control throughout the shift  Outcome: Progressing  Goal: Free from opioid side effects throughout the shift  Outcome: Progressing  Goal: Free from acute confusion related to pain meds throughout the shift  Outcome: Progressing

## 2025-07-16 NOTE — CARE PLAN
The patient's goals for the shift include      The clinical goals for the shift include pt will remain hds and vss by 7/15/25 at 1900      Problem: Pain - Adult  Goal: Verbalizes/displays adequate comfort level or baseline comfort level  Outcome: Progressing     Problem: Safety - Adult  Goal: Free from fall injury  Outcome: Progressing     Problem: Discharge Planning  Goal: Discharge to home or other facility with appropriate resources  Outcome: Progressing     Problem: Chronic Conditions and Co-morbidities  Goal: Patient's chronic conditions and co-morbidity symptoms are monitored and maintained or improved  Outcome: Progressing     Problem: Nutrition  Goal: Nutrient intake appropriate for maintaining nutritional needs  Outcome: Progressing

## 2025-07-16 NOTE — PROGRESS NOTES
Joellen Narayan is a 24 y.o. male on day 2 of admission presenting with Sickle cell pain crisis (Multi).    Subjective   Patient resting in bed, states pain increasing today, 9/10 in lower back up to between shoulder blades. Also endorses pain in chest and with inspiration. Currently on 3 L NC supplemental oxygen. Denies cough, does have mild shortness of breath without the oxygen. Had nausea and vomiting overnight, poor oral intake. Frequent, loose bowel movements, no abdominal pain. Denies headache, fever, chills, numbness, tingling, weakness, swelling. Discussed CT PE results that show developing infiltrate, opacities in lungs, coupled with pain and worsening hemolysis labs, recommend RBC exchange as soon as possible, patient currently refusing, states does not want apheresis line placed.     Returned to room later in day to discuss exchange transfusion again with patient, patient continues to refuse. Discussed starting antibiotics in meantime, patient agreeable.       Objective     Physical Exam  Constitutional:       General: He is in acute distress.      Appearance: Normal appearance.   HENT:      Mouth/Throat:      Mouth: Mucous membranes are dry.      Pharynx: Oropharynx is clear.     Eyes:      General: Scleral icterus present.      Extraocular Movements: Extraocular movements intact.      Pupils: Pupils are equal, round, and reactive to light.       Cardiovascular:      Rate and Rhythm: Normal rate and regular rhythm.      Pulses: Normal pulses.      Heart sounds: Normal heart sounds.   Pulmonary:      Effort: Pulmonary effort is normal.      Breath sounds: Normal breath sounds.   Abdominal:      General: Abdomen is flat.      Palpations: Abdomen is soft.      Tenderness: There is no abdominal tenderness.     Musculoskeletal:         General: Normal range of motion.     Skin:     General: Skin is warm and dry.      Capillary Refill: Capillary refill takes less than 2 seconds.     Neurological:       "General: No focal deficit present.      Mental Status: He is alert and oriented to person, place, and time.     Psychiatric:         Mood and Affect: Mood normal.         Behavior: Behavior normal.         Last Recorded Vitals  Blood pressure 133/68, pulse 83, temperature 36 °C (96.8 °F), temperature source Temporal, resp. rate 16, height 1.88 m (6' 2.02\"), weight 70.3 kg (155 lb), SpO2 93%.  Intake/Output last 3 Shifts:  I/O last 3 completed shifts:  In: 1200 (17.1 mL/kg) [P.O.:850; Blood:350]  Out: 950 (13.5 mL/kg) [Urine:950 (0.4 mL/kg/hr)]  Weight: 70.3 kg     Relevant Results                 Scheduled medications  Scheduled Medications[1]  Continuous medications  Continuous Medications[2]  PRN medications  PRN Medications[3]  Results for orders placed or performed during the hospital encounter of 07/14/25 (from the past 24 hours)   CBC and Auto Differential   Result Value Ref Range    WBC 15.5 (H) 4.4 - 11.3 x10*3/uL    nRBC 11.1 (H) 0.0 - 0.0 /100 WBCs    RBC 2.62 (L) 4.50 - 5.90 x10*6/uL    Hemoglobin 8.2 (L) 13.5 - 17.5 g/dL    Hematocrit 22.7 (L) 41.0 - 52.0 %    MCV 87 80 - 100 fL    MCH 31.3 26.0 - 34.0 pg    MCHC 36.1 (H) 32.0 - 36.0 g/dL    RDW 22.5 (H) 11.5 - 14.5 %    Platelets 249 150 - 450 x10*3/uL    Neutrophils % 72.8 40.0 - 80.0 %    Immature Granulocytes %, Automated 1.4 (H) 0.0 - 0.9 %    Lymphocytes % 12.1 13.0 - 44.0 %    Monocytes % 12.9 2.0 - 10.0 %    Eosinophils % 0.5 0.0 - 6.0 %    Basophils % 0.3 0.0 - 2.0 %    Neutrophils Absolute 11.30 (H) 1.20 - 7.70 x10*3/uL    Immature Granulocytes Absolute, Automated 0.22 0.00 - 0.70 x10*3/uL    Lymphocytes Absolute 1.88 1.20 - 4.80 x10*3/uL    Monocytes Absolute 2.00 (H) 0.10 - 1.00 x10*3/uL    Eosinophils Absolute 0.08 0.00 - 0.70 x10*3/uL    Basophils Absolute 0.04 0.00 - 0.10 x10*3/uL   Lactate Dehydrogenase   Result Value Ref Range     (H) 84 - 246 U/L   Reticulocytes   Result Value Ref Range    Retic % 29.6 (H) 0.5 - 2.0 %    Retic " Absolute 0.776 (H) 0.022 - 0.118 x10*6/uL    Reticulocyte Hemoglobin 31 28 - 38 pg    Immature Retic fraction 29.3 (H) <=16.0 %   Comprehensive metabolic panel   Result Value Ref Range    Glucose 84 74 - 99 mg/dL    Sodium 136 136 - 145 mmol/L    Potassium 3.5 3.5 - 5.3 mmol/L    Chloride 100 98 - 107 mmol/L    Bicarbonate 28 21 - 32 mmol/L    Anion Gap 12 10 - 20 mmol/L    Urea Nitrogen 9 6 - 23 mg/dL    Creatinine 0.77 0.50 - 1.30 mg/dL    eGFR >90 >60 mL/min/1.73m*2    Calcium 9.5 8.6 - 10.6 mg/dL    Albumin 4.5 3.4 - 5.0 g/dL    Alkaline Phosphatase 129 (H) 33 - 120 U/L    Total Protein 6.4 6.4 - 8.2 g/dL    AST 53 (H) 9 - 39 U/L    Bilirubin, Total 27.0 (H) 0.0 - 1.2 mg/dL    ALT 22 10 - 52 U/L   Bilirubin, direct   Result Value Ref Range    Bilirubin, Direct 8.4 (H) 0.0 - 0.3 mg/dL     *Note: Due to a large number of results and/or encounters for the requested time period, some results have not been displayed. A complete set of results can be found in Results Review.     CT angio chest for pulmonary embolism  Result Date: 7/16/2025  Interpreted By:  Rogelio Tsang, STUDY: CT ANGIO CHEST FOR PULMONARY EMBOLISM;  7/16/2025 10:57 am   INDICATION: Signs/Symptoms:chest pain, increased oxygen requirement.     COMPARISON: 05/03/2025.   ACCESSION NUMBER(S): [id number]   ORDERING CLINICIAN: DULCE CARMEN   TECHNIQUE: Helical data acquisition of the chest was obtained after intravenous administration of 55 ML Omnipaque 350, as per PE protocol. Images were reformatted in coronal and sagittal planes. Axial and coronal maximum intensity projection (MIP) images were created and reviewed.   FINDINGS: POTENTIAL LIMITATIONS OF THE STUDY: None   HEART AND VESSELS: There are no discrete filling defects within main pulmonary artery and its branches to suggest acute pulmonary embolism. Main pulmonary artery and its branches are normal in caliber.   The thoracic aorta normal in course and caliber. No coronary artery  calcifications are seen. Please note, the study is not optimized for evaluation of coronary arteries.   The cardiac chambers are not enlarged.   There is no pericardial effusion seen.   MEDIASTINUM AND ANA, LOWER NECK AND AXILLA: The visualized thyroid gland is within normal limits. No evidence of thoracic lymphadenopathy by CT criteria. There is prominent anterior mediastinal thymic tissue which may represent a component of thymic rebound. Esophagus appears within normal limits as seen.   LUNGS AND AIRWAYS: The trachea and central airways are patent. No endobronchial lesion is seen.   There is slight interval increase in left basilar opacities.     UPPER ABDOMEN: The visualized subdiaphragmatic structures demonstrate no remarkable findings. There is atrophic splenic tissue.     CHEST WALL AND OSSEOUS STRUCTURES: Chest wall is within normal limits. No acute osseous pathology.There are no suspicious osseous lesions.       1. There is no evidence of pulmonary embolism. 2. There is slight interval increase in left basilar opacities which may be atelectatic and related to parenchymal changes of sickle crisis. Correlate with any concern for developing left basilar infiltrate/pneumonia.   MACRO: None   Signed by: Rogelio Tsang 7/16/2025 11:35 AM Dictation workstation:   [id number]    Bedside Midline Imaging  Result Date: 7/15/2025  These images are not reportable by radiology and will not be interpreted by  Radiologists.    CT cervical spine w IV contrast  Result Date: 7/15/2025  Interpreted By:  Bruna Gramajo and Awan Komal STUDY: CT CERVICAL SPINE W IV CONTRAST; CT THORACIC SPINE W IV CONTRAST; CT LUMBAR SPINE W IV CONTRAST;  7/15/2025 10:39 am; 7/15/2025 10:41 am   INDICATION: Signs/Symptoms:sickle cell disease, severe back pain, recent hx septic arthritis.     COMPARISON: MR cervical spine 12/20/2023   ACCESSION NUMBER(S): [id number]; [id number]; [id number]   ORDERING CLINICIAN: LINDY ARCEO    TECHNIQUE: Axial noncontrast images of the total spine with coronal and sagittal reconstructed images.   FINDINGS: CT Cervical Spine:   PREVERTEBRAL SOFT TISSUES: Within normal limits.   CRANIOCERVICAL JUNCTION: Intact.   ALIGNMENT: No traumatic malalignment or traumatic facet widening.   VERTEBRAE: No acute fracture. Vertebral body heights are maintained. Diffusely sclerotic appearance of the vertebral bodies.   SPINAL CANAL/INTERVERTEBRAL DISCS: No high-grade spinal canal stenosis. No significant disc height loss. Varying degrees of degenerative disc disease,   NEURAL FORAMINA: No significant neural foraminal stenosis. Multilevel uncovertebral joint and facet arthropathy notably contribute to   OTHER: No significant abnormality.   CT Thoracic Spine:   ALIGNMENT: Mild dextroscoliosis centered at the proximal thoracic spine centered at T5-6.   VERTEBRAE/INTERVERTEBRAL DISCS: The thoracic vertebral body heights are intact. The disc spaces are preserved. There is no significant spinal canal stenosis. Diffusely sclerotic appearance of the vertebral bodies.   PARASPINOUS SOFT TISSUES: The paraspinal soft tissues are unremarkable.   LUMBAR: ALIGNMENT: The alignment is normal.   VERTEBRAE: The vertebral bodies are normal in height. There is no fracture or aggressive osseous lesion. Diffusely sclerotic appearance of the vertebral bodies.   DISCS: The disc spaces are maintained.   PARAVERTEBRAL SOFT TISSUES AND VISUALIZED RETROPERITONEUM: The visualized paravertebral soft tissues appear within normal limits.   EVALUATION OF INDIVIDUAL LEVELS DEMONSTRATES: L1-2: No disk herniation  spinal canal or neuroforaminal stenosis.   L2-3: No disk herniation  spinal canal or neuroforaminal stenosis.   L3-4: No disk herniation  spinal canal or neuroforaminal stenosis.   L4-5: No disk herniation  spinal canal or neuroforaminal stenosis.   L5-S1: No disk herniation  spinal canal or neuroforaminal stenosis.   LIMITED EVALUATION OF UPPER  SACRUM AND SACROILIAC JOINTS: Unremarkable.       1. No acute fracture or traumatic malalignment of the cervical, thoracic, or lumbar spine. 2. Diffusely sclerotic appearance of the vertebral bodies, corresponding to history of sickle cell disease. 3. Mild dextroconvex curvature of the thoracic spine centered at T5-6.     I personally reviewed the images/study and I agree with the findings as stated by Dr. Naa La. This study was interpreted at [place], [place].   MACRO: None.   Signed by: Bruna Gramajo 7/15/2025 12:46 PM Dictation workstation:   [id number]    CT lumbar spine w IV contrast  Result Date: 7/15/2025  Interpreted By:  Bruna Gramajo and Awan Komal STUDY: CT CERVICAL SPINE W IV CONTRAST; CT THORACIC SPINE W IV CONTRAST; CT LUMBAR SPINE W IV CONTRAST;  7/15/2025 10:39 am; 7/15/2025 10:41 am   INDICATION: Signs/Symptoms:sickle cell disease, severe back pain, recent hx septic arthritis.     COMPARISON: MR cervical spine 12/20/2023   ACCESSION NUMBER(S): [id number]; [id number]; [id number]   ORDERING CLINICIAN: LINDY ARCEO   TECHNIQUE: Axial noncontrast images of the total spine with coronal and sagittal reconstructed images.   FINDINGS: CT Cervical Spine:   PREVERTEBRAL SOFT TISSUES: Within normal limits.   CRANIOCERVICAL JUNCTION: Intact.   ALIGNMENT: No traumatic malalignment or traumatic facet widening.   VERTEBRAE: No acute fracture. Vertebral body heights are maintained. Diffusely sclerotic appearance of the vertebral bodies.   SPINAL CANAL/INTERVERTEBRAL DISCS: No high-grade spinal canal stenosis. No significant disc height loss. Varying degrees of degenerative disc disease,   NEURAL FORAMINA: No significant neural foraminal stenosis. Multilevel uncovertebral joint and facet arthropathy notably contribute to   OTHER: No significant abnormality.   CT Thoracic Spine:   ALIGNMENT: Mild dextroscoliosis centered at the proximal thoracic spine  centered at T5-6.   VERTEBRAE/INTERVERTEBRAL DISCS: The thoracic vertebral body heights are intact. The disc spaces are preserved. There is no significant spinal canal stenosis. Diffusely sclerotic appearance of the vertebral bodies.   PARASPINOUS SOFT TISSUES: The paraspinal soft tissues are unremarkable.   LUMBAR: ALIGNMENT: The alignment is normal.   VERTEBRAE: The vertebral bodies are normal in height. There is no fracture or aggressive osseous lesion. Diffusely sclerotic appearance of the vertebral bodies.   DISCS: The disc spaces are maintained.   PARAVERTEBRAL SOFT TISSUES AND VISUALIZED RETROPERITONEUM: The visualized paravertebral soft tissues appear within normal limits.   EVALUATION OF INDIVIDUAL LEVELS DEMONSTRATES: L1-2: No disk herniation  spinal canal or neuroforaminal stenosis.   L2-3: No disk herniation  spinal canal or neuroforaminal stenosis.   L3-4: No disk herniation  spinal canal or neuroforaminal stenosis.   L4-5: No disk herniation  spinal canal or neuroforaminal stenosis.   L5-S1: No disk herniation  spinal canal or neuroforaminal stenosis.   LIMITED EVALUATION OF UPPER SACRUM AND SACROILIAC JOINTS: Unremarkable.       1. No acute fracture or traumatic malalignment of the cervical, thoracic, or lumbar spine. 2. Diffusely sclerotic appearance of the vertebral bodies, corresponding to history of sickle cell disease. 3. Mild dextroconvex curvature of the thoracic spine centered at T5-6.     I personally reviewed the images/study and I agree with the findings as stated by Dr. Naa La. This study was interpreted at [place], [place].   MACRO: None.   Signed by: Bruna Gramajo 7/15/2025 12:46 PM Dictation workstation:   [id number]    CT thoracic spine w IV contrast  Result Date: 7/15/2025  Interpreted By:  Bruna Gramajo and Awan Komal STUDY: CT CERVICAL SPINE W IV CONTRAST; CT THORACIC SPINE W IV CONTRAST; CT LUMBAR SPINE W IV CONTRAST;  7/15/2025  10:39 am; 7/15/2025 10:41 am   INDICATION: Signs/Symptoms:sickle cell disease, severe back pain, recent hx septic arthritis.     COMPARISON: MR cervical spine 12/20/2023   ACCESSION NUMBER(S): [id number]; [id number]; [id number]   ORDERING CLINICIAN: LINDY ARCEO   TECHNIQUE: Axial noncontrast images of the total spine with coronal and sagittal reconstructed images.   FINDINGS: CT Cervical Spine:   PREVERTEBRAL SOFT TISSUES: Within normal limits.   CRANIOCERVICAL JUNCTION: Intact.   ALIGNMENT: No traumatic malalignment or traumatic facet widening.   VERTEBRAE: No acute fracture. Vertebral body heights are maintained. Diffusely sclerotic appearance of the vertebral bodies.   SPINAL CANAL/INTERVERTEBRAL DISCS: No high-grade spinal canal stenosis. No significant disc height loss. Varying degrees of degenerative disc disease,   NEURAL FORAMINA: No significant neural foraminal stenosis. Multilevel uncovertebral joint and facet arthropathy notably contribute to   OTHER: No significant abnormality.   CT Thoracic Spine:   ALIGNMENT: Mild dextroscoliosis centered at the proximal thoracic spine centered at T5-6.   VERTEBRAE/INTERVERTEBRAL DISCS: The thoracic vertebral body heights are intact. The disc spaces are preserved. There is no significant spinal canal stenosis. Diffusely sclerotic appearance of the vertebral bodies.   PARASPINOUS SOFT TISSUES: The paraspinal soft tissues are unremarkable.   LUMBAR: ALIGNMENT: The alignment is normal.   VERTEBRAE: The vertebral bodies are normal in height. There is no fracture or aggressive osseous lesion. Diffusely sclerotic appearance of the vertebral bodies.   DISCS: The disc spaces are maintained.   PARAVERTEBRAL SOFT TISSUES AND VISUALIZED RETROPERITONEUM: The visualized paravertebral soft tissues appear within normal limits.   EVALUATION OF INDIVIDUAL LEVELS DEMONSTRATES: L1-2: No disk herniation  spinal canal or neuroforaminal stenosis.   L2-3: No disk herniation  spinal  canal or neuroforaminal stenosis.   L3-4: No disk herniation  spinal canal or neuroforaminal stenosis.   L4-5: No disk herniation  spinal canal or neuroforaminal stenosis.   L5-S1: No disk herniation  spinal canal or neuroforaminal stenosis.   LIMITED EVALUATION OF UPPER SACRUM AND SACROILIAC JOINTS: Unremarkable.       1. No acute fracture or traumatic malalignment of the cervical, thoracic, or lumbar spine. 2. Diffusely sclerotic appearance of the vertebral bodies, corresponding to history of sickle cell disease. 3. Mild dextroconvex curvature of the thoracic spine centered at T5-6.     I personally reviewed the images/study and I agree with the findings as stated by Dr. Naa La. This study was interpreted at [place], [place].   MACRO: None.   Signed by: Bruna Gramajo 7/15/2025 12:46 PM Dictation workstation:   [id number]    XR chest 1 view  Result Date: 7/15/2025  Interpreted By:  Allyn Woods and Krasnoschlik Nicholas STUDY: XR CHEST 1 VIEW;  7/15/2025 11:01 am   INDICATION: Signs/Symptoms:sickle cell, worsening lysis labs.     COMPARISON: Chest radiograph 07/14/2025   ACCESSION NUMBER(S): [id number]   ORDERING CLINICIAN: LINDY ARCEO   FINDINGS: AP radiograph of the chest was provided.   CARDIOMEDIASTINAL SILHOUETTE: Cardiomediastinal silhouette is stable in size and configuration.   LUNGS: No new focal consolidation, pleural effusion, or pneumothorax.   ABDOMEN: No remarkable upper abdominal findings.   BONES: No acute osseous changes.       1.  No evidence of acute cardiopulmonary process.   I personally reviewed the images/study and I agree with the findings as stated by resident physician Gabriel Groves MD. This study was interpreted at [place], [place].   MACRO: None   Signed by: Allyn Woods 7/15/2025 11:18 AM Dictation workstation:   [id number]                 Assessment & Plan  Sickle  cell pain crisis (Multi)    Joellen Narayan is a 24 y.o. 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 Cxr w/o evidence of acute process 7/14 and 7/15. EKG WNL. Hemolysis labs significantly elevated 7/15 and pain worsening reflecting vaso-occlusive crisis. Plan x1u pRBC 7/15. 7/15 CT Spine w/o acute process. Started on IV dilaudid for pain control. S/p 1 unit pRBCs on 7/15 with poor incrementation in hgb, bili increased to 27 on 7/16. Dr. Cruz recommends RBC exchange, however patient refusing at this time. CT PE negative for acute PE, does show increase in left basilar opacities concerning for pneumonia, started on IV Levaquin (7/16-). DC home resumed nox O2 pending improvement in pain and sickle cell crisis.    Updates 7/16:  - Bili up to 27, hemoglobin only incrementing to 8.2 from 7.8 after 1 unit of pRBCs, Dr. Cruz recommends exchange transfusion, however patient refusing at this time  - CT PE negative for acute PE, does show increasing left basilar opacities concerning for pneumonia, started Levaquin 750 mg IV  - Continue same pain regimen  - liver ultrasound pending     # Hgb SS disease acute on chronic pain   # C/f acute chest  # C/f pneumonia  - OARRS reviewed on admission, no aberrancies noted (last filled 5/22 oxy ER qty 28 tabs, qty 14 days, and 5/16 oxy IR qty 28 tabs 7 days)  - No carepath available   - Primary hematologist: Dr. Cruz - updated 7/16, recommends RBC exchange transfusion on 7/16, however patient refusing, does not want apheresis line placed   - pt  reports he has no home oxycodone at home and has been calling refill line and pharmacy without success or hearing back   - 7/14 and 7/15 CXR w/o evidence of acute process  - 7/15 CT Spine w/o acute process   - EKG in ED showed sinus rhythm, no ST elevation or ischemic changes, trop pending   - Hemolysis labs significantly above baseline reflecting a vaso-occlusive crisis  - Hg 7.8 (baseline ~7-8) ->8.2 (7/16); s/p 1 unit pRBC 7/15  - Tbili 10.4 (7/14) BL ~ 8-10;->27 (7/16); direct bili 1.2 (7/14)->8.4 (7/16)  -  (7/14)->846 (7/16)  - WBC 15.5 (7/16)  - Exchange labs drawn (7/15)  - started 4mg IVP dilaudid q2 PRN severe pain (7/15- 7/16), increased to 5mg (7/15), offered PCA but pt declined 7/15   - started toradol 30mg q6 sched x3 days with PPI support   - re-started home oxycontin ER 30 mg BID on admit   - Continue lidocaine patches  - c/w home folic acid 1mg daily   - Hg S 75% (7/14)  - utox + MJ (7/14)   - CT PE negative for acute PE, does show increasing left basilar opacities concerning for pneumonia, started Levaquin 750 mg IV (7/16-)  - Continue D51/2  mL/hr x 1 day (7/16-)  - supportive care: lidocaine patches, hot packs   - bowel regimen for opioid induced constipation, zofran for opioid induced nausea, and benadrly for opioid induced pruritis      # Hx recent Septic Arthritis  R elbow   - Ortho surgeon: Dr. Tello   - 5/9 MRI w/anesthesia R radius/humerus showing concern for septic arthritis, osteomyelitis, myositis, severe muscle and subcutaneous soft tissue edema, and possible cellulitis   - 5/10 OR s/p R elbow I&D with ortho    - s/p IV daptomycin 6mg/kg q24h and ertapenem 1g q24h until 6/20/25 per ID  - 6/9 Most recent Ortho visit, sutures removed, cont to work on ROM, pt declining PT, fuv in 6-8 weeks for xrays of R elbow, upcoming 8/18      # Hx of nocturnal oxygen dependence   - On 2-3L at night, however patient reports daytime hypoxia as well in the past, currently on 2L      # Hx  choledocholithiasis 7/2024  # hyperbilirubinemia   - Worsening hemolysis in setting of vaso-occlusive crisis could be contributing to increased hyperbilirubinemia    - no abdominal pain, afebrile, leukocytosis increased from baseline and nausea/vomiting x2 days  - July 2024 s/p ERCP with biliary sphincterotomy where sludge and stones were removed, achieving complete clearance    - 1/31/25 was planned for cholecystectomy with Dr. Dove but no show on day of surgery and again 2/13 and 2/27    - Tbili uptrending (7/15) 14 ->27 (7/16), direct bili 1.2 (7/15)->8/4 (7/16)  - started D5 1/2NS @75ml/h x24h (7/15-16)  - liver ultrasound pending (7/16)     # Hx Priapism   - No active issues on admit    - Hx previously drained by urology    - Continue home Sudafed 30mg daily PRN priapism     # Nodular lymphocyte predominant Hodgkins lymphoma (NLPHL)     - Follows with Dr. Stoll   - s/p Rituxan and prednisone q3 weeks (C1 1/18/24, C2 2/8/24, Missed C3 d/t sickle cell pain crisis, C4 4/4/24, C5 5/16/24, C6 6/7/24)    - 3/13 No show to onc appt    - 4/9 PET showed interval development of new FDG focus in mid abdomen c/w recurrence   - 6/26 OP with Dr. Stoll, discussed with patient and his mother re; treatment options (including Rituxan maintenance) vs observation, pt deciding options   - due for repeat CT/PET 7/15, will need rescheduled outpatient      # Prophy   - s/p x6 weeks ASA 81mg BID postop, stopped 6/21  - lovenox   - SCDs, encouraged ambulation       # dispo    - Full code  - DC home resumed noc O2 pending improvement in pain  - FUV 8/12 Pulm, 8/18 Ortho    Assessment and plan as above discussed with attending physician Dr. Parnell.      I spent 90 minutes in the professional and overall care of this patient.      Lorri Belle, APRN-CNP         [1] acetaminophen, 650 mg, oral, Once  diphenhydrAMINE, 25 mg, oral, Once  enoxaparin, 40 mg, subcutaneous, Daily  folic acid, 1 mg, oral, Daily  ketorolac, 30 mg,  intravenous, q6h REYNALDO  levoFLOXacin, 750 mg, intravenous, q24h  lidocaine, 5 mL, infiltration, Once  lidocaine, 2 patch, transdermal, Daily  oxyCODONE ER, 30 mg, oral, q12h REYNALDO  oxygen, , inhalation, Continuous - Inhalation  pantoprazole, 40 mg, oral, Daily before breakfast  polyethylene glycol, 17 g, oral, Daily  [2] dextrose 5%-0.45 % sodium chloride, 100 mL/hr, Last Rate: 100 mL/hr (07/16/25 1625)  [3] PRN medications: diphenhydrAMINE, HYDROmorphone, ondansetron

## 2025-07-17 ENCOUNTER — APPOINTMENT (OUTPATIENT)
Dept: RADIOLOGY | Facility: HOSPITAL | Age: 25
DRG: 811 | End: 2025-07-17
Payer: COMMERCIAL

## 2025-07-17 LAB
ALBUMIN SERPL BCP-MCNC: 3.9 G/DL (ref 3.4–5)
ALP SERPL-CCNC: 109 U/L (ref 33–120)
ALT SERPL W P-5'-P-CCNC: 20 U/L (ref 10–52)
ANION GAP SERPL CALC-SCNC: 11 MMOL/L (ref 10–20)
AST SERPL W P-5'-P-CCNC: 46 U/L (ref 9–39)
BASOPHILS # BLD AUTO: 0.02 X10*3/UL (ref 0–0.1)
BASOPHILS NFR BLD AUTO: 0.2 %
BILIRUB DIRECT SERPL-MCNC: 16.8 MG/DL (ref 0–0.3)
BILIRUB SERPL-MCNC: 37.3 MG/DL (ref 0–1.2)
BUN SERPL-MCNC: 8 MG/DL (ref 6–23)
CALCIUM SERPL-MCNC: 9.3 MG/DL (ref 8.6–10.6)
CHLORIDE SERPL-SCNC: 99 MMOL/L (ref 98–107)
CO2 SERPL-SCNC: 32 MMOL/L (ref 21–32)
CREAT SERPL-MCNC: 0.71 MG/DL (ref 0.5–1.3)
DAT-POLYSPECIFIC: NORMAL
EGFRCR SERPLBLD CKD-EPI 2021: >90 ML/MIN/1.73M*2
EOSINOPHIL # BLD AUTO: 0.54 X10*3/UL (ref 0–0.7)
EOSINOPHIL NFR BLD AUTO: 5 %
ERYTHROCYTE [DISTWIDTH] IN BLOOD BY AUTOMATED COUNT: 22.7 % (ref 11.5–14.5)
GGT SERPL-CCNC: 42 U/L (ref 5–64)
GLUCOSE SERPL-MCNC: 81 MG/DL (ref 74–99)
HCT VFR BLD AUTO: 19.7 % (ref 41–52)
HGB BLD-MCNC: 7.1 G/DL (ref 13.5–17.5)
HGB RETIC QN: 33 PG (ref 28–38)
IMM GRANULOCYTES # BLD AUTO: 0.13 X10*3/UL (ref 0–0.7)
IMM GRANULOCYTES NFR BLD AUTO: 1.2 % (ref 0–0.9)
IMMATURE RETIC FRACTION: 28 %
LDH SERPL L TO P-CCNC: 709 U/L (ref 84–246)
LYMPHOCYTES # BLD AUTO: 1.34 X10*3/UL (ref 1.2–4.8)
LYMPHOCYTES NFR BLD AUTO: 12.5 %
MCH RBC QN AUTO: 31 PG (ref 26–34)
MCHC RBC AUTO-ENTMCNC: 36 G/DL (ref 32–36)
MCV RBC AUTO: 86 FL (ref 80–100)
MONOCYTES # BLD AUTO: 1.33 X10*3/UL (ref 0.1–1)
MONOCYTES NFR BLD AUTO: 12.4 %
NEUTROPHILS # BLD AUTO: 7.36 X10*3/UL (ref 1.2–7.7)
NEUTROPHILS NFR BLD AUTO: 68.7 %
NRBC BLD-RTO: 13 /100 WBCS (ref 0–0)
PLATELET # BLD AUTO: 226 X10*3/UL (ref 150–450)
POTASSIUM SERPL-SCNC: 3.9 MMOL/L (ref 3.5–5.3)
PROT SERPL-MCNC: 5.6 G/DL (ref 6.4–8.2)
RBC # BLD AUTO: 2.29 X10*6/UL (ref 4.5–5.9)
RETICS #: 0.73 X10*6/UL (ref 0.02–0.12)
RETICS/RBC NFR AUTO: 32.1 % (ref 0.5–2)
SODIUM SERPL-SCNC: 138 MMOL/L (ref 136–145)
WBC # BLD AUTO: 10.7 X10*3/UL (ref 4.4–11.3)

## 2025-07-17 PROCEDURE — 99223 1ST HOSP IP/OBS HIGH 75: CPT

## 2025-07-17 PROCEDURE — 85025 COMPLETE CBC W/AUTO DIFF WBC: CPT

## 2025-07-17 PROCEDURE — 2500000005 HC RX 250 GENERAL PHARMACY W/O HCPCS: Performed by: EMERGENCY MEDICINE

## 2025-07-17 PROCEDURE — 99221 1ST HOSP IP/OBS SF/LOW 40: CPT | Performed by: NURSE PRACTITIONER

## 2025-07-17 PROCEDURE — 83615 LACTATE (LD) (LDH) ENZYME: CPT

## 2025-07-17 PROCEDURE — 86880 COOMBS TEST DIRECT: CPT

## 2025-07-17 PROCEDURE — 76705 ECHO EXAM OF ABDOMEN: CPT

## 2025-07-17 PROCEDURE — 1170000001 HC PRIVATE ONCOLOGY ROOM DAILY

## 2025-07-17 PROCEDURE — 82248 BILIRUBIN DIRECT: CPT

## 2025-07-17 PROCEDURE — 99232 SBSQ HOSP IP/OBS MODERATE 35: CPT

## 2025-07-17 PROCEDURE — 82977 ASSAY OF GGT: CPT

## 2025-07-17 PROCEDURE — 2500000004 HC RX 250 GENERAL PHARMACY W/ HCPCS (ALT 636 FOR OP/ED)

## 2025-07-17 PROCEDURE — 84075 ASSAY ALKALINE PHOSPHATASE: CPT

## 2025-07-17 PROCEDURE — 76705 ECHO EXAM OF ABDOMEN: CPT | Performed by: RADIOLOGY

## 2025-07-17 PROCEDURE — 2500000001 HC RX 250 WO HCPCS SELF ADMINISTERED DRUGS (ALT 637 FOR MEDICARE OP)

## 2025-07-17 PROCEDURE — 85045 AUTOMATED RETICULOCYTE COUNT: CPT

## 2025-07-17 RX ORDER — DEXTROSE MONOHYDRATE AND SODIUM CHLORIDE 5; .45 G/100ML; G/100ML
75 INJECTION, SOLUTION INTRAVENOUS CONTINUOUS
Status: ACTIVE | OUTPATIENT
Start: 2025-07-17 | End: 2025-07-18

## 2025-07-17 RX ADMIN — DEXTROSE AND SODIUM CHLORIDE 75 ML/HR: 5; .45 INJECTION, SOLUTION INTRAVENOUS at 18:44

## 2025-07-17 RX ADMIN — DEXTROSE AND SODIUM CHLORIDE 75 ML/HR: 5; .45 INJECTION, SOLUTION INTRAVENOUS at 12:01

## 2025-07-17 RX ADMIN — KETOROLAC TROMETHAMINE 30 MG: 30 INJECTION, SOLUTION INTRAMUSCULAR; INTRAVENOUS at 12:00

## 2025-07-17 RX ADMIN — OXYCODONE HYDROCHLORIDE 30 MG: 20 TABLET, FILM COATED, EXTENDED RELEASE ORAL at 08:31

## 2025-07-17 RX ADMIN — PANTOPRAZOLE SODIUM 40 MG: 40 TABLET, DELAYED RELEASE ORAL at 06:41

## 2025-07-17 RX ADMIN — KETOROLAC TROMETHAMINE 30 MG: 30 INJECTION, SOLUTION INTRAMUSCULAR; INTRAVENOUS at 06:41

## 2025-07-17 RX ADMIN — HYDROMORPHONE HYDROCHLORIDE 5 MG: 2 INJECTION, SOLUTION INTRAMUSCULAR; INTRAVENOUS; SUBCUTANEOUS at 02:22

## 2025-07-17 RX ADMIN — Medication 2 L/MIN: at 08:32

## 2025-07-17 RX ADMIN — HYDROMORPHONE HYDROCHLORIDE 5 MG: 2 INJECTION, SOLUTION INTRAMUSCULAR; INTRAVENOUS; SUBCUTANEOUS at 00:22

## 2025-07-17 RX ADMIN — FOLIC ACID 1 MG: 1 TABLET ORAL at 08:31

## 2025-07-17 RX ADMIN — LEVOFLOXACIN 750 MG: 5 INJECTION, SOLUTION INTRAVENOUS at 12:01

## 2025-07-17 RX ADMIN — HYDROMORPHONE HYDROCHLORIDE 5 MG: 2 INJECTION, SOLUTION INTRAMUSCULAR; INTRAVENOUS; SUBCUTANEOUS at 06:39

## 2025-07-17 RX ADMIN — HYDROMORPHONE HYDROCHLORIDE 5 MG: 2 INJECTION, SOLUTION INTRAMUSCULAR; INTRAVENOUS; SUBCUTANEOUS at 04:34

## 2025-07-17 RX ADMIN — OXYCODONE HYDROCHLORIDE 30 MG: 20 TABLET, FILM COATED, EXTENDED RELEASE ORAL at 20:51

## 2025-07-17 RX ADMIN — POLYETHYLENE GLYCOL 3350 17 G: 17 POWDER, FOR SOLUTION ORAL at 08:31

## 2025-07-17 RX ADMIN — HYDROMORPHONE HYDROCHLORIDE 5 MG: 2 INJECTION, SOLUTION INTRAMUSCULAR; INTRAVENOUS; SUBCUTANEOUS at 12:00

## 2025-07-17 RX ADMIN — DIPHENHYDRAMINE HYDROCHLORIDE 25 MG: 25 CAPSULE ORAL at 20:51

## 2025-07-17 RX ADMIN — KETOROLAC TROMETHAMINE 30 MG: 30 INJECTION, SOLUTION INTRAMUSCULAR; INTRAVENOUS at 18:41

## 2025-07-17 RX ADMIN — HYDROMORPHONE HYDROCHLORIDE 5 MG: 2 INJECTION, SOLUTION INTRAMUSCULAR; INTRAVENOUS; SUBCUTANEOUS at 20:51

## 2025-07-17 RX ADMIN — HYDROMORPHONE HYDROCHLORIDE 5 MG: 2 INJECTION, SOLUTION INTRAMUSCULAR; INTRAVENOUS; SUBCUTANEOUS at 18:41

## 2025-07-17 RX ADMIN — KETOROLAC TROMETHAMINE 30 MG: 30 INJECTION, SOLUTION INTRAMUSCULAR; INTRAVENOUS at 00:22

## 2025-07-17 RX ADMIN — HYDROMORPHONE HYDROCHLORIDE 5 MG: 2 INJECTION, SOLUTION INTRAMUSCULAR; INTRAVENOUS; SUBCUTANEOUS at 08:32

## 2025-07-17 RX ADMIN — HYDROMORPHONE HYDROCHLORIDE 5 MG: 2 INJECTION, SOLUTION INTRAMUSCULAR; INTRAVENOUS; SUBCUTANEOUS at 14:54

## 2025-07-17 RX ADMIN — HYDROMORPHONE HYDROCHLORIDE 5 MG: 2 INJECTION, SOLUTION INTRAMUSCULAR; INTRAVENOUS; SUBCUTANEOUS at 22:59

## 2025-07-17 ASSESSMENT — COGNITIVE AND FUNCTIONAL STATUS - GENERAL
DAILY ACTIVITIY SCORE: 24
DAILY ACTIVITIY SCORE: 24
MOBILITY SCORE: 24
MOBILITY SCORE: 24

## 2025-07-17 ASSESSMENT — ENCOUNTER SYMPTOMS
VOMITING: 1
MUSCULOSKELETAL NEGATIVE: 1
NAUSEA: 1
NEUROLOGICAL NEGATIVE: 1
RESPIRATORY NEGATIVE: 1
CONSTITUTIONAL NEGATIVE: 1
EYES NEGATIVE: 1
PSYCHIATRIC NEGATIVE: 1

## 2025-07-17 ASSESSMENT — PAIN SCALES - GENERAL
PAINLEVEL_OUTOF10: 7
PAINLEVEL_OUTOF10: 8
PAINLEVEL_OUTOF10: 9
PAINLEVEL_OUTOF10: 8
PAINLEVEL_OUTOF10: 7
PAINLEVEL_OUTOF10: 9
PAINLEVEL_OUTOF10: 9
PAINLEVEL_OUTOF10: 6
PAINLEVEL_OUTOF10: 9
PAINLEVEL_OUTOF10: 7
PAINLEVEL_OUTOF10: 9
PAINLEVEL_OUTOF10: 7
PAINLEVEL_OUTOF10: 9
PAINLEVEL_OUTOF10: 9
PAINLEVEL_OUTOF10: 8
PAINLEVEL_OUTOF10: 7
PAINLEVEL_OUTOF10: 7

## 2025-07-17 ASSESSMENT — PAIN - FUNCTIONAL ASSESSMENT
PAIN_FUNCTIONAL_ASSESSMENT: 0-10

## 2025-07-17 NOTE — CONSULTS
[facility]  ACUTE CARE SURGERY - HISTORY AND PHYSICAL / CONSULT    Patient Name: Joellen Narayan  MRN: [id number]  Admit Date: 714  : 2000  AGE: 24 y.o.   GENDER: male  ==============================================================================  TODAY'S ASSESSMENT AND PLAN OF CARE:  Mr. Narayan is a 23 yo male with a past medical history of Hodgkins lymphoma (s/p rituxan/prednisone, not on current active chemo), HbSS sicle cell disease, and recent septic arthritis (s/p I&D of right elbow in May 2025) who presented to the ED for santosh and chest pain consistent with his acute on chronic sickle cell pain. ACS is being consulted for hyperbilirubinemia. Patient with T bili of 10 on admission now up to 37.   Of note, Patient underwent ERCP back in 2024 with biliary sphincterotomy where sludge and stones were removed. He was scheduled for cholecystectomy with Dr. Dove on 25 but did not show on day of surgery.   RUQ US completed today showing small layering of sludge and cholelithiasis but no cholecystitis. GB wall thickness 0.3cm, CBD 0.5cm and no evidence of cholecystitis. Patient with benign abdominal exam and states he would not like his GB out at ths time. Patient in acute pain crisis with labs indicative of hemolysis without signs of biliary contribution of hyperbilirubinemia.     Recs:  - No indication for surgical intervention this admission  - Continue medical management of sickle cell crisis  - IF concern for choledocholithiasis, can pursue MRCP to evaluate instead of surgical plan during sickle cell crisis.   - Patient may follow up with Dr. Dove or another general surgeon as an outpatient for elective lap cholecystectomy.   - Appreciate GI recs  - Rest of care per primary team  - Please call ACS with questions or concerns    Patient discussed with Attending Dr. Liliana Louie APRN-[facility]  Acute Care Surgery  Pager [phone]    Total time spent on HPI,  physical exam and reviewing clinical/imaging findings approximately thirty minutes.     ==============================================================================  CHIEF COMPLAINT/REASON FOR CONSULT:  Joellen Narayan is a 23 yo male with PMHx of Hodgkins lymphoma, HbSS sickle cell disease, who presented this admission for chest pain and back pain. On admission patient noted to be jaundice with a hyperbilirubinemia with initial T bili of 10.4 now 37.3 for which ACS was consulted. Patient denies any abdominal pain but had nausea and vomiting which he states is common with his sickle cell pain. He denies any change in appetite or bowel function. Patient noted to have sclera icterus which he states is chronic. He currently denies fever, chills, chest pain or SOB.     PAST MEDICAL HISTORY:   PMH: Hodgkins lymphoma (s/p rituxan/prednisone, not on current active chemo), HbSS sickle cell disease, acute chest syndrome, nocturnal hypoxia(baseline 2-3L at night), septic arthritis     PSH: I&D of right elbow for septic arthritis   Surgical History[1]  FH: reviewed and non contributory    Family History[2]  SOCIAL HISTORY:    Smoking: smokes cigars Tobacco Use History[3]    Alcohol: Denies    Social History     Substance and Sexual Activity   Alcohol Use Not Currently    Comment: rarely       Drug use: denies but per chart Marijuana     MEDICATIONS:   Prior to Admission medications   Medication Sig Start Date End Date Taking? Authorizing Provider   folic acid (Folvite) 1 mg tablet Take 1 tablet (1 mg) by mouth once daily.  Patient not taking: Reported on 7/15/2025 4/9/25 4/9/26  RAVI Cannon   oxygen (O2) gas therapy Inhale 1 each (1 L) once daily at bedtime.    Historical Provider, MD   aspirin 81 mg chewable tablet Chew 1 tablet (81 mg) 2 times a day. 5/13/25 7/14/25  RAVI Macias   heparin flush (heparin LockFlush,Porcine,,PF,) 10 unit/mL injection Infuse 5 mL (50 Units) into a venous  catheter once daily. [facility]  7/15/25  Historical Provider, MD   pseudoephedrine (Sudogest) 60 mg tablet Take 1 tablet (60 mg) by mouth every 8 hours if needed for congestion for up to 10 days.  Patient not taking: Reported on 5/3/2025 10/23/24 7/14/25  Mary Moody, APRN-CNP   sodium chloride 0.9% (Normal Saline Flush) flush Infuse 10 mL into a venous catheter once daily. per [facility]. 20ml after lab draw  7/15/25  Historical Provider, MD     ALLERGIES: Cefepime, Amoxicillin, Ceftriaxone   RX Allergies[4]    REVIEW OF SYSTEMS:  Review of Systems   Constitutional: Negative.    Eyes: Negative.    Respiratory: Negative.     Cardiovascular:  Positive for chest pain.   Gastrointestinal:  Positive for nausea and vomiting.   Genitourinary: Negative.    Musculoskeletal: Negative.    Skin: Negative.    Neurological: Negative.    Psychiatric/Behavioral: Negative.       PHYSICAL EXAM:  Physical Exam  Constitutional:       Appearance: Normal appearance.     Eyes:      General: Scleral icterus present.      Extraocular Movements: Extraocular movements intact.       Cardiovascular:      Rate and Rhythm: Normal rate.      Pulses: Normal pulses.   Pulmonary:      Comments: Non labored, good chest expansion, on RA  Abdominal:      Comments: Soft, non distended, non tender to palpitation, negative Roman's sign     Musculoskeletal:         General: Normal range of motion.     Skin:     General: Skin is warm and dry.     Neurological:      Mental Status: He is alert and oriented to person, place, and time.     Psychiatric:         Behavior: Behavior normal.       IMAGING SUMMARY:  (summary of findings, not a copy of dictation)  RUQ US 7/17:  IMPRESSION:  1. Hepatomegaly with diffuse echogenicity compatible with diffuse  fatty infiltration.  2. Small volume biliary sludge and gallstones without evidence of  cholecystitis or biliary dilation.    LABS:  Results for orders placed or performed during the hospital encounter of 07/14/25 (from the  past 24 hours)   CBC and Auto Differential   Result Value Ref Range    WBC 10.7 4.4 - 11.3 x10*3/uL    nRBC 13.0 (H) 0.0 - 0.0 /100 WBCs    RBC 2.29 (L) 4.50 - 5.90 x10*6/uL    Hemoglobin 7.1 (L) 13.5 - 17.5 g/dL    Hematocrit 19.7 (L) 41.0 - 52.0 %    MCV 86 80 - 100 fL    MCH 31.0 26.0 - 34.0 pg    MCHC 36.0 32.0 - 36.0 g/dL    RDW 22.7 (H) 11.5 - 14.5 %    Platelets 226 150 - 450 x10*3/uL    Neutrophils % 68.7 40.0 - 80.0 %    Immature Granulocytes %, Automated 1.2 (H) 0.0 - 0.9 %    Lymphocytes % 12.5 13.0 - 44.0 %    Monocytes % 12.4 2.0 - 10.0 %    Eosinophils % 5.0 0.0 - 6.0 %    Basophils % 0.2 0.0 - 2.0 %    Neutrophils Absolute 7.36 1.20 - 7.70 x10*3/uL    Immature Granulocytes Absolute, Automated 0.13 0.00 - 0.70 x10*3/uL    Lymphocytes Absolute 1.34 1.20 - 4.80 x10*3/uL    Monocytes Absolute 1.33 (H) 0.10 - 1.00 x10*3/uL    Eosinophils Absolute 0.54 0.00 - 0.70 x10*3/uL    Basophils Absolute 0.02 0.00 - 0.10 x10*3/uL   Lactate Dehydrogenase   Result Value Ref Range     (H) 84 - 246 U/L   Reticulocytes   Result Value Ref Range    Retic % 32.1 (H) 0.5 - 2.0 %    Retic Absolute 0.735 (H) 0.022 - 0.118 x10*6/uL    Reticulocyte Hemoglobin 33 28 - 38 pg    Immature Retic fraction 28.0 (H) <=16.0 %   Comprehensive metabolic panel   Result Value Ref Range    Glucose 81 74 - 99 mg/dL    Sodium 138 136 - 145 mmol/L    Potassium 3.9 3.5 - 5.3 mmol/L    Chloride 99 98 - 107 mmol/L    Bicarbonate 32 21 - 32 mmol/L    Anion Gap 11 10 - 20 mmol/L    Urea Nitrogen 8 6 - 23 mg/dL    Creatinine 0.71 0.50 - 1.30 mg/dL    eGFR >90 >60 mL/min/1.73m*2    Calcium 9.3 8.6 - 10.6 mg/dL    Albumin 3.9 3.4 - 5.0 g/dL    Alkaline Phosphatase 109 33 - 120 U/L    Total Protein 5.6 (L) 6.4 - 8.2 g/dL    AST 46 (H) 9 - 39 U/L    Bilirubin, Total 37.3 (H) 0.0 - 1.2 mg/dL    ALT 20 10 - 52 U/L   Direct Antiglobulin Test   Result Value Ref Range    HELLEN-POLYSPECIFIC NEG    Gamma-Glutamyl Transferase   Result Value Ref Range    GGT  42 5 - 64 U/L   Bilirubin, direct   Result Value Ref Range    Bilirubin, Direct 16.8 (H) 0.0 - 0.3 mg/dL     *Note: Due to a large number of results and/or encounters for the requested time period, some results have not been displayed. A complete set of results can be found in Results Review.       I have reviewed all laboratory and imaging results ordered/pertinent for this encounter.          [1]   Past Surgical History:  Procedure Laterality Date    CT GUIDED PERCUTANEOUS ABDOMINAL RETROPERITONEUM BIOPSY  2023    CT GUIDED PERCUTANEOUS BIOPSY RETROPERITONEUM 2023 Chrystal Ridley MD [facility] CT    CT GUIDED PERCUTANEOUS BIOPSY LYMPH NODE SUPERFICIAL  2022    CT GUIDED PERCUTANEOUS BIOPSY LYMPH NODE SUPERFICIAL 2022 DO[facility] OFFICE LEGACY    IR CVC TUNNELED  2022    IR CVC TUNNELED 2022 [facility] CLINICAL LEGACY   [2]   Family History  Problem Relation Name Age of Onset    Sickle cell trait Mother      Diabetes Mother      Sickle cell trait Father      Lung cancer Brother     [3]   Social History  Tobacco Use   Smoking Status Every Day    Types: Cigars    Last attempt to quit:     Years since quittin.5    Passive exposure: Past   Smokeless Tobacco Never   Tobacco Comments     1 Black and mild daily for 3 years   [4]   Allergies  Allergen Reactions    Cefepime Hallucinations    Amoxicillin Hives    Ceftriaxone Hives and Rash

## 2025-07-17 NOTE — CARE PLAN
The clinical goals for the shift include pt will remain HDS and VSS throughout shift 7/17/25 by 1900.      Problem: Pain - Adult  Goal: Verbalizes/displays adequate comfort level or baseline comfort level  Outcome: Progressing     Problem: Safety - Adult  Goal: Free from fall injury  Outcome: Progressing     Problem: Discharge Planning  Goal: Discharge to home or other facility with appropriate resources  Outcome: Progressing     Problem: Chronic Conditions and Co-morbidities  Goal: Patient's chronic conditions and co-morbidity symptoms are monitored and maintained or improved  Outcome: Progressing     Problem: Nutrition  Goal: Nutrient intake appropriate for maintaining nutritional needs  Outcome: Progressing     Problem: Pain  Goal: Takes deep breaths with improved pain control throughout the shift  Outcome: Progressing  Goal: Turns in bed with improved pain control throughout the shift  Outcome: Progressing  Goal: Walks with improved pain control throughout the shift  Outcome: Progressing  Goal: Performs ADL's with improved pain control throughout shift  Outcome: Progressing  Goal: Participates in PT with improved pain control throughout the shift  Outcome: Progressing  Goal: Free from opioid side effects throughout the shift  Outcome: Progressing  Goal: Free from acute confusion related to pain meds throughout the shift  Outcome: Progressing

## 2025-07-17 NOTE — PROGRESS NOTES
Joellen Narayan is a 24 y.o. male on day 3 of admission presenting with Sickle cell pain crisis (Multi).    Subjective   Patient resting in room, states pain feels improved today, currently 6/10 in back. Denies chest pain today, no pain with inspiration. Denies fevers, chills, abdominal pain, nausea improved with zofran, no vomiting overnight. Denies constipation or diarrhea. Discussed continuing elevation of bilirubin to 37 today, hemoglobin drop to 7.1 and concern for worsening hemolysis, asked patient if willing to have RBC exchange today, patient continuing to refuse at this time. Discussed concern for worsening progression of acute sickle cell crisis and possible need for emergent exchange or transfer to MICU if hemolysis continues or patient becomes hemodynamically unstable, patient verbalizes understanding and wishes to defer RBC exchange at this time. Discussed with Dr. Cruz and relayed to patient, will hold off on simple blood transfusion at this time and consult GI, plan for RUQ ultrasound this morning, patient agreeable. Denies headache, vision change, numbness, tingling, weakness, swelling.       Objective     Physical Exam  Constitutional:       Appearance: Normal appearance.   HENT:      Mouth/Throat:      Mouth: Mucous membranes are moist.      Pharynx: Oropharynx is clear.     Eyes:      General: Scleral icterus present.      Extraocular Movements: Extraocular movements intact.      Pupils: Pupils are equal, round, and reactive to light.       Cardiovascular:      Rate and Rhythm: Normal rate and regular rhythm.      Pulses: Normal pulses.      Heart sounds: Normal heart sounds.   Pulmonary:      Effort: Pulmonary effort is normal.      Breath sounds: Normal breath sounds.   Abdominal:      General: Abdomen is flat.      Palpations: Abdomen is soft.      Tenderness: There is no abdominal tenderness.     Musculoskeletal:         General: Normal range of motion.      Cervical back: Normal range of  "motion.     Skin:     General: Skin is warm and dry.      Capillary Refill: Capillary refill takes less than 2 seconds.     Neurological:      General: No focal deficit present.      Mental Status: He is alert and oriented to person, place, and time.     Psychiatric:         Mood and Affect: Mood normal.         Behavior: Behavior normal.         Last Recorded Vitals  Blood pressure 132/72, pulse 92, temperature 36.5 °C (97.7 °F), temperature source Temporal, resp. rate 16, height 1.88 m (6' 2.02\"), weight 70.3 kg (155 lb), SpO2 95%.  Intake/Output last 3 Shifts:  I/O last 3 completed shifts:  In: 1437.1 (20.4 mL/kg) [I.V.:1087.1 (15.5 mL/kg); Blood:350]  Out: 1300 (18.5 mL/kg) [Urine:1300 (0.5 mL/kg/hr)]  Weight: 70.3 kg     Relevant Results               Scheduled medications  Scheduled Medications[1]  Continuous medications  Continuous Medications[2]  PRN medications  PRN Medications[3]  Results for orders placed or performed during the hospital encounter of 07/14/25 (from the past 24 hours)   CBC and Auto Differential   Result Value Ref Range    WBC 10.7 4.4 - 11.3 x10*3/uL    nRBC 13.0 (H) 0.0 - 0.0 /100 WBCs    RBC 2.29 (L) 4.50 - 5.90 x10*6/uL    Hemoglobin 7.1 (L) 13.5 - 17.5 g/dL    Hematocrit 19.7 (L) 41.0 - 52.0 %    MCV 86 80 - 100 fL    MCH 31.0 26.0 - 34.0 pg    MCHC 36.0 32.0 - 36.0 g/dL    RDW 22.7 (H) 11.5 - 14.5 %    Platelets 226 150 - 450 x10*3/uL    Neutrophils % 68.7 40.0 - 80.0 %    Immature Granulocytes %, Automated 1.2 (H) 0.0 - 0.9 %    Lymphocytes % 12.5 13.0 - 44.0 %    Monocytes % 12.4 2.0 - 10.0 %    Eosinophils % 5.0 0.0 - 6.0 %    Basophils % 0.2 0.0 - 2.0 %    Neutrophils Absolute 7.36 1.20 - 7.70 x10*3/uL    Immature Granulocytes Absolute, Automated 0.13 0.00 - 0.70 x10*3/uL    Lymphocytes Absolute 1.34 1.20 - 4.80 x10*3/uL    Monocytes Absolute 1.33 (H) 0.10 - 1.00 x10*3/uL    Eosinophils Absolute 0.54 0.00 - 0.70 x10*3/uL    Basophils Absolute 0.02 0.00 - 0.10 x10*3/uL   Lactate " Dehydrogenase   Result Value Ref Range     (H) 84 - 246 U/L   Reticulocytes   Result Value Ref Range    Retic % 32.1 (H) 0.5 - 2.0 %    Retic Absolute 0.735 (H) 0.022 - 0.118 x10*6/uL    Reticulocyte Hemoglobin 33 28 - 38 pg    Immature Retic fraction 28.0 (H) <=16.0 %   Comprehensive metabolic panel   Result Value Ref Range    Glucose 81 74 - 99 mg/dL    Sodium 138 136 - 145 mmol/L    Potassium 3.9 3.5 - 5.3 mmol/L    Chloride 99 98 - 107 mmol/L    Bicarbonate 32 21 - 32 mmol/L    Anion Gap 11 10 - 20 mmol/L    Urea Nitrogen 8 6 - 23 mg/dL    Creatinine 0.71 0.50 - 1.30 mg/dL    eGFR >90 >60 mL/min/1.73m*2    Calcium 9.3 8.6 - 10.6 mg/dL    Albumin 3.9 3.4 - 5.0 g/dL    Alkaline Phosphatase 109 33 - 120 U/L    Total Protein 5.6 (L) 6.4 - 8.2 g/dL    AST 46 (H) 9 - 39 U/L    Bilirubin, Total 37.3 (H) 0.0 - 1.2 mg/dL    ALT 20 10 - 52 U/L   Gamma-Glutamyl Transferase   Result Value Ref Range    GGT 42 5 - 64 U/L     *Note: Due to a large number of results and/or encounters for the requested time period, some results have not been displayed. A complete set of results can be found in Results Review.   CT angio chest for pulmonary embolism  Result Date: 7/16/2025  Interpreted By:  Rogelio Tsang, STUDY: CT ANGIO CHEST FOR PULMONARY EMBOLISM;  7/16/2025 10:57 am   INDICATION: Signs/Symptoms:chest pain, increased oxygen requirement.     COMPARISON: 05/03/2025.   ACCESSION NUMBER(S): [id number]   ORDERING CLINICIAN: DULCE CARMEN   TECHNIQUE: Helical data acquisition of the chest was obtained after intravenous administration of 55 ML Omnipaque 350, as per PE protocol. Images were reformatted in coronal and sagittal planes. Axial and coronal maximum intensity projection (MIP) images were created and reviewed.   FINDINGS: POTENTIAL LIMITATIONS OF THE STUDY: None   HEART AND VESSELS: There are no discrete filling defects within main pulmonary artery and its branches to suggest acute pulmonary embolism. Main  pulmonary artery and its branches are normal in caliber.   The thoracic aorta normal in course and caliber. No coronary artery calcifications are seen. Please note, the study is not optimized for evaluation of coronary arteries.   The cardiac chambers are not enlarged.   There is no pericardial effusion seen.   MEDIASTINUM AND ANA, LOWER NECK AND AXILLA: The visualized thyroid gland is within normal limits. No evidence of thoracic lymphadenopathy by CT criteria. There is prominent anterior mediastinal thymic tissue which may represent a component of thymic rebound. Esophagus appears within normal limits as seen.   LUNGS AND AIRWAYS: The trachea and central airways are patent. No endobronchial lesion is seen.   There is slight interval increase in left basilar opacities.     UPPER ABDOMEN: The visualized subdiaphragmatic structures demonstrate no remarkable findings. There is atrophic splenic tissue.     CHEST WALL AND OSSEOUS STRUCTURES: Chest wall is within normal limits. No acute osseous pathology.There are no suspicious osseous lesions.       1. There is no evidence of pulmonary embolism. 2. There is slight interval increase in left basilar opacities which may be atelectatic and related to parenchymal changes of sickle crisis. Correlate with any concern for developing left basilar infiltrate/pneumonia.   MACRO: None   Signed by: Rogelio Tsang 7/16/2025 11:35 AM Dictation workstation:   [id number]    Bedside Midline Imaging  Result Date: 7/15/2025  These images are not reportable by radiology and will not be interpreted by  Radiologists.    CT cervical spine w IV contrast  Result Date: 7/15/2025  Interpreted By:  Bruna Gramajo and Awan Komal STUDY: CT CERVICAL SPINE W IV CONTRAST; CT THORACIC SPINE W IV CONTRAST; CT LUMBAR SPINE W IV CONTRAST;  7/15/2025 10:39 am; 7/15/2025 10:41 am   INDICATION: Signs/Symptoms:sickle cell disease, severe back pain, recent hx septic arthritis.     COMPARISON: MR cervical  spine 12/20/2023   ACCESSION NUMBER(S): [id number]; [id number]; [id number]   ORDERING CLINICIAN: LINDY ARCEO   TECHNIQUE: Axial noncontrast images of the total spine with coronal and sagittal reconstructed images.   FINDINGS: CT Cervical Spine:   PREVERTEBRAL SOFT TISSUES: Within normal limits.   CRANIOCERVICAL JUNCTION: Intact.   ALIGNMENT: No traumatic malalignment or traumatic facet widening.   VERTEBRAE: No acute fracture. Vertebral body heights are maintained. Diffusely sclerotic appearance of the vertebral bodies.   SPINAL CANAL/INTERVERTEBRAL DISCS: No high-grade spinal canal stenosis. No significant disc height loss. Varying degrees of degenerative disc disease,   NEURAL FORAMINA: No significant neural foraminal stenosis. Multilevel uncovertebral joint and facet arthropathy notably contribute to   OTHER: No significant abnormality.   CT Thoracic Spine:   ALIGNMENT: Mild dextroscoliosis centered at the proximal thoracic spine centered at T5-6.   VERTEBRAE/INTERVERTEBRAL DISCS: The thoracic vertebral body heights are intact. The disc spaces are preserved. There is no significant spinal canal stenosis. Diffusely sclerotic appearance of the vertebral bodies.   PARASPINOUS SOFT TISSUES: The paraspinal soft tissues are unremarkable.   LUMBAR: ALIGNMENT: The alignment is normal.   VERTEBRAE: The vertebral bodies are normal in height. There is no fracture or aggressive osseous lesion. Diffusely sclerotic appearance of the vertebral bodies.   DISCS: The disc spaces are maintained.   PARAVERTEBRAL SOFT TISSUES AND VISUALIZED RETROPERITONEUM: The visualized paravertebral soft tissues appear within normal limits.   EVALUATION OF INDIVIDUAL LEVELS DEMONSTRATES: L1-2: No disk herniation  spinal canal or neuroforaminal stenosis.   L2-3: No disk herniation  spinal canal or neuroforaminal stenosis.   L3-4: No disk herniation  spinal canal or neuroforaminal stenosis.   L4-5: No disk herniation  spinal canal or  neuroforaminal stenosis.   L5-S1: No disk herniation  spinal canal or neuroforaminal stenosis.   LIMITED EVALUATION OF UPPER SACRUM AND SACROILIAC JOINTS: Unremarkable.       1. No acute fracture or traumatic malalignment of the cervical, thoracic, or lumbar spine. 2. Diffusely sclerotic appearance of the vertebral bodies, corresponding to history of sickle cell disease. 3. Mild dextroconvex curvature of the thoracic spine centered at T5-6.     I personally reviewed the images/study and I agree with the findings as stated by Dr. Naa La. This study was interpreted at [place], [place].   MACRO: None.   Signed by: Bruna Gramajo 7/15/2025 12:46 PM Dictation workstation:   [id number]    CT lumbar spine w IV contrast  Result Date: 7/15/2025  Interpreted By:  Bruna Gramajo and Awan Komal STUDY: CT CERVICAL SPINE W IV CONTRAST; CT THORACIC SPINE W IV CONTRAST; CT LUMBAR SPINE W IV CONTRAST;  7/15/2025 10:39 am; 7/15/2025 10:41 am   INDICATION: Signs/Symptoms:sickle cell disease, severe back pain, recent hx septic arthritis.     COMPARISON: MR cervical spine 12/20/2023   ACCESSION NUMBER(S): [id number]; [id number]; [id number]   ORDERING CLINICIAN: LINDY ARCEO   TECHNIQUE: Axial noncontrast images of the total spine with coronal and sagittal reconstructed images.   FINDINGS: CT Cervical Spine:   PREVERTEBRAL SOFT TISSUES: Within normal limits.   CRANIOCERVICAL JUNCTION: Intact.   ALIGNMENT: No traumatic malalignment or traumatic facet widening.   VERTEBRAE: No acute fracture. Vertebral body heights are maintained. Diffusely sclerotic appearance of the vertebral bodies.   SPINAL CANAL/INTERVERTEBRAL DISCS: No high-grade spinal canal stenosis. No significant disc height loss. Varying degrees of degenerative disc disease,   NEURAL FORAMINA: No significant neural foraminal stenosis. Multilevel uncovertebral joint and facet arthropathy notably contribute to   OTHER: No  significant abnormality.   CT Thoracic Spine:   ALIGNMENT: Mild dextroscoliosis centered at the proximal thoracic spine centered at T5-6.   VERTEBRAE/INTERVERTEBRAL DISCS: The thoracic vertebral body heights are intact. The disc spaces are preserved. There is no significant spinal canal stenosis. Diffusely sclerotic appearance of the vertebral bodies.   PARASPINOUS SOFT TISSUES: The paraspinal soft tissues are unremarkable.   LUMBAR: ALIGNMENT: The alignment is normal.   VERTEBRAE: The vertebral bodies are normal in height. There is no fracture or aggressive osseous lesion. Diffusely sclerotic appearance of the vertebral bodies.   DISCS: The disc spaces are maintained.   PARAVERTEBRAL SOFT TISSUES AND VISUALIZED RETROPERITONEUM: The visualized paravertebral soft tissues appear within normal limits.   EVALUATION OF INDIVIDUAL LEVELS DEMONSTRATES: L1-2: No disk herniation  spinal canal or neuroforaminal stenosis.   L2-3: No disk herniation  spinal canal or neuroforaminal stenosis.   L3-4: No disk herniation  spinal canal or neuroforaminal stenosis.   L4-5: No disk herniation  spinal canal or neuroforaminal stenosis.   L5-S1: No disk herniation  spinal canal or neuroforaminal stenosis.   LIMITED EVALUATION OF UPPER SACRUM AND SACROILIAC JOINTS: Unremarkable.       1. No acute fracture or traumatic malalignment of the cervical, thoracic, or lumbar spine. 2. Diffusely sclerotic appearance of the vertebral bodies, corresponding to history of sickle cell disease. 3. Mild dextroconvex curvature of the thoracic spine centered at T5-6.     I personally reviewed the images/study and I agree with the findings as stated by Dr. Naa La. This study was interpreted at [place], [place].   MACRO: None.   Signed by: Bruna Gramajo 7/15/2025 12:46 PM Dictation workstation:   [id number]    CT thoracic spine w IV contrast  Result Date: 7/15/2025  Interpreted By:  Bruna Gramajo and Awan  Naa STUDY: CT CERVICAL SPINE W IV CONTRAST; CT THORACIC SPINE W IV CONTRAST; CT LUMBAR SPINE W IV CONTRAST;  7/15/2025 10:39 am; 7/15/2025 10:41 am   INDICATION: Signs/Symptoms:sickle cell disease, severe back pain, recent hx septic arthritis.     COMPARISON: MR cervical spine 12/20/2023   ACCESSION NUMBER(S): [id number]; [id number]; [id number]   ORDERING CLINICIAN: LINDY ARCEO   TECHNIQUE: Axial noncontrast images of the total spine with coronal and sagittal reconstructed images.   FINDINGS: CT Cervical Spine:   PREVERTEBRAL SOFT TISSUES: Within normal limits.   CRANIOCERVICAL JUNCTION: Intact.   ALIGNMENT: No traumatic malalignment or traumatic facet widening.   VERTEBRAE: No acute fracture. Vertebral body heights are maintained. Diffusely sclerotic appearance of the vertebral bodies.   SPINAL CANAL/INTERVERTEBRAL DISCS: No high-grade spinal canal stenosis. No significant disc height loss. Varying degrees of degenerative disc disease,   NEURAL FORAMINA: No significant neural foraminal stenosis. Multilevel uncovertebral joint and facet arthropathy notably contribute to   OTHER: No significant abnormality.   CT Thoracic Spine:   ALIGNMENT: Mild dextroscoliosis centered at the proximal thoracic spine centered at T5-6.   VERTEBRAE/INTERVERTEBRAL DISCS: The thoracic vertebral body heights are intact. The disc spaces are preserved. There is no significant spinal canal stenosis. Diffusely sclerotic appearance of the vertebral bodies.   PARASPINOUS SOFT TISSUES: The paraspinal soft tissues are unremarkable.   LUMBAR: ALIGNMENT: The alignment is normal.   VERTEBRAE: The vertebral bodies are normal in height. There is no fracture or aggressive osseous lesion. Diffusely sclerotic appearance of the vertebral bodies.   DISCS: The disc spaces are maintained.   PARAVERTEBRAL SOFT TISSUES AND VISUALIZED RETROPERITONEUM: The visualized paravertebral soft tissues appear within normal limits.   EVALUATION OF INDIVIDUAL  LEVELS DEMONSTRATES: L1-2: No disk herniation  spinal canal or neuroforaminal stenosis.   L2-3: No disk herniation  spinal canal or neuroforaminal stenosis.   L3-4: No disk herniation  spinal canal or neuroforaminal stenosis.   L4-5: No disk herniation  spinal canal or neuroforaminal stenosis.   L5-S1: No disk herniation  spinal canal or neuroforaminal stenosis.   LIMITED EVALUATION OF UPPER SACRUM AND SACROILIAC JOINTS: Unremarkable.       1. No acute fracture or traumatic malalignment of the cervical, thoracic, or lumbar spine. 2. Diffusely sclerotic appearance of the vertebral bodies, corresponding to history of sickle cell disease. 3. Mild dextroconvex curvature of the thoracic spine centered at T5-6.     I personally reviewed the images/study and I agree with the findings as stated by Dr. Naa La. This study was interpreted at [facility], [place].   MACRO: None.   Signed by: Bruna Gramajo 7/15/2025 12:46 PM Dictation workstation:   [id number]    XR chest 1 view  Result Date: 7/15/2025  Interpreted By:  Allyn Woods and Krasnoschlik Nicholas STUDY: XR CHEST 1 VIEW;  7/15/2025 11:01 am   INDICATION: Signs/Symptoms:sickle cell, worsening lysis labs.     COMPARISON: Chest radiograph 07/14/2025   ACCESSION NUMBER(S): [id number]   ORDERING CLINICIAN: LINDY ARCEO   FINDINGS: AP radiograph of the chest was provided.   CARDIOMEDIASTINAL SILHOUETTE: Cardiomediastinal silhouette is stable in size and configuration.   LUNGS: No new focal consolidation, pleural effusion, or pneumothorax.   ABDOMEN: No remarkable upper abdominal findings.   BONES: No acute osseous changes.       1.  No evidence of acute cardiopulmonary process.   I personally reviewed the images/study and I agree with the findings as stated by resident physician Gabriel Groves MD. This study was interpreted at [facility], [place].   MACRO: None    Signed by: Allyn Woods 7/15/2025 11:18 AM Dictation workstation:   [id number]                   Assessment & Plan  Sickle cell pain crisis (Multi)    Joellen Narayan is a 24 y.o. 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 Cxr w/o evidence of acute process 7/14 and 7/15. EKG WNL. Hemolysis labs significantly elevated 7/15 and pain worsening reflecting vaso-occlusive crisis. 7/15 CT Spine w/o acute process. Started on IV dilaudid for pain control. S/p 1 unit pRBCs on 7/15 with poor incrementation in hgb, bili increased to 27 (7/16). Dr. Cruz recommends RBC exchange, however patient refusing at this time. CT PE negative for acute PE, does show increase in left basilar opacities concerning for pneumonia, started on IV Levaquin (7/16-). Bilirubin continuing to increase to 37.3 on 7/17, GI consulted, recs pending. DC home resumed nox O2 pending improvement in pain and sickle cell crisis.     Updates 7/17:  - Bili up to 37.3, hgb 7.1, Dr. Cruz aware, no plan for simple transfusion at this time; patient continues to refuse RBC exchange transfusion  - RUQ ultrasound shows hepatomegaly, small volume biliary sludge and gallstones without evidence of cholecystitis or biliary dilation.  - GI consulted for increasing bilirubin, recs pending  - Acute care surgery consulted for possible cholecystectomy, recs pending     # Hgb SS disease acute on chronic pain   # C/f acute chest  # pneumonia  - OARRS reviewed on admission, no aberrancies noted (last filled 5/22 oxy ER qty 28 tabs, qty  14 days, and 5/16 oxy IR qty 28 tabs 7 days)  - No carepath available   - Primary hematologist: Dr. Cruz - updated 7/16 and 7/17, recommends RBC exchange transfusion, however patient refusing on 7/16 and 7/17, does not want apheresis line placed   - pt reports he has no home oxycodone at home and has been calling refill line and pharmacy without success or hearing back   - 7/14 and 7/15 CXR w/o evidence of acute process  - 7/15 CT Spine w/o acute process   - EKG in ED showed sinus rhythm, no ST elevation or ischemic changes, trop pending   - Hemolysis labs significantly above baseline reflecting a vaso-occlusive crisis  - Hg 7.8 (7/15), (baseline ~7-8); s/p 1 unit pRBC 7/15->8.2 (7/16)-> 7.1 (7/17); discussed with Dr. Cruz, no simple transfusion at this time  - Tbili 10.4 (7/14) BL ~ 8-10;->27 (7/16)->37/3 (7/17); direct bili 1.2 (7/14)->8.4 (7/16)->16.8 (7/17)  -  (7/14)->846 (7/16)->709 (7/17)  - WBC 15.5 (7/16)-> 10.7 (7/17)  - Exchange labs drawn (7/15)  - started 4mg IVP dilaudid q2 PRN severe pain (7/15- 7/16), increased to 5mg (7/15-), offered PCA but pt declined 7/15   - started toradol 30mg q6 sched x3 days with PPI support   - re-started home oxycontin ER 30 mg BID on admit   - Continue lidocaine patches  - c/w home folic acid 1mg daily   - Hg S 75% (7/14)  - utox + MJ (7/14)   - CT PE negative for acute PE, does show increasing left basilar opacities concerning for pneumonia, started Levaquin 750 mg IV (7/16-)  - s/p D51/2  mL/hr x 1 day (7/16); continue D51/2NS 75 mL/hr x 1 day (7/17)  - supportive care: lidocaine patches, hot packs   - bowel regimen for opioid induced constipation, zofran for opioid induced nausea, and benadrly for opioid induced pruritis      # Hx choledocholithiasis 7/2024  # hyperbilirubinemia   - Worsening hemolysis in setting of vaso-occlusive crisis could be contributing to increased hyperbilirubinemia    - no abdominal pain, afebrile, leukocytosis increased from  baseline and nausea/vomiting x2 days, now improving (7/17)  - July 2024 s/p ERCP with biliary sphincterotomy where sludge and stones were removed, achieving complete clearance    - 1/31/25 was planned for cholecystectomy with Dr. Dove but no show on day of surgery and again 2/13 and 2/27    - Tbili uptrending (7/15) 14 ->27 (7/16)-> 37.3 (7/17), direct bili 1.2 (7/15)->8.4 (7/16)->16.8 (7/17)  - S/p D5 1/2NS @75ml/h x24h (7/15), increased to 100 mL/hr (7/16), continue 7/17 at 75 mL/hr x 1 day (7/17)  - RUQ ultrasound (7/17) shows hepatomegaly, small volume biliary sludge and gallstones without evidence of cholecystitis or biliary dilation.  - GI consulted (7/17), recs pending  - ACS consulted (7/17) for possible cholecystectomy, recs pending    # Hx recent Septic Arthritis  R elbow   - Ortho surgeon: Dr. Tello   - 5/9 MRI w/anesthesia R radius/humerus showing concern for septic arthritis, osteomyelitis, myositis, severe muscle and subcutaneous soft tissue edema, and possible cellulitis   - 5/10 OR s/p R elbow I&D with ortho    - s/p IV daptomycin 6mg/kg q24h and ertapenem 1g q24h until 6/20/25 per ID  - 6/9 Most recent Ortho visit, sutures removed, cont to work on ROM, pt declining PT, fuv in 6-8 weeks for xrays of R elbow, upcoming 8/18      # Hx of nocturnal oxygen dependence   - On 2-3L at night, however patient reports daytime hypoxia as well in the past, currently on 2L      # Hx Priapism   - No active issues on admit    - Hx previously drained by urology    - Continue home Sudafed 30mg daily PRN priapism     # Nodular lymphocyte predominant Hodgkins lymphoma (NLPHL)     - Follows with Dr. Stoll   - s/p Rituxan and prednisone q3 weeks (C1 1/18/24, C2 2/8/24, Missed C3 d/t sickle cell pain crisis, C4 4/4/24, C5 5/16/24, C6 6/7/24)    - 3/13 No show to onc appt    - 4/9 PET showed interval development of new FDG focus in mid abdomen c/w recurrence   - 6/26 OP with Dr. Stoll, discussed with patient and his  mother re; treatment options (including Rituxan maintenance) vs observation, pt deciding options   - due for repeat CT/PET 7/15, will need rescheduled outpatient      # Prophy   - s/p x6 weeks ASA 81mg BID postop, stopped 6/21  - lovenox   - SCDs, encouraged ambulation       # dispo    - Full code  - DC home resumed noc O2 pending improvement in pain  - FUV 8/12 Pulm, 8/18 Ortho    Assessment and plan as above discussed with attending physician Dr. Parnell.      I spent 90 minutes in the professional and overall care of this patient.      Lorri Belle, APRN-CNP           [1] acetaminophen, 650 mg, oral, Once  diphenhydrAMINE, 25 mg, oral, Once  enoxaparin, 40 mg, subcutaneous, Daily  folic acid, 1 mg, oral, Daily  ketorolac, 30 mg, intravenous, q6h REYNALDO  levoFLOXacin, 750 mg, intravenous, q24h  lidocaine, 5 mL, infiltration, Once  lidocaine, 2 patch, transdermal, Daily  oxyCODONE ER, 30 mg, oral, q12h REYNALDO  oxygen, , inhalation, Continuous - Inhalation  pantoprazole, 40 mg, oral, Daily before breakfast  polyethylene glycol, 17 g, oral, Daily  [2] dextrose 5%-0.45 % sodium chloride, 100 mL/hr, Last Rate: 100 mL/hr (07/16/25 1846)  [3] PRN medications: diphenhydrAMINE, HYDROmorphone, ondansetron

## 2025-07-17 NOTE — CONSULTS
[facility]   [facility] [place]  INITIAL CONSULT NOTE       Reason For Consult  Hyperbilirubinemia     SUBJECTIVE     History Of Present Illness  Joellen Narayan is a 24 y.o. male PMH nodular lymphocyte predominant Hodgkins lymphoma (NLPHL) (s/p rituxan/prednisone, not on current active chemo), HbSS sickle cell disease admitted on 7/14/25 for back and chest pain c/f acute pain crisis. GI is consulted for hyperbilirubinemia ISO hemolysis due to sickle cell disease. Patient does not report any abdominal pain or fever. He has nausea and vomiting since last night, he has had multiple vomiting episodes, small and large volume, non-bilious and non-bloody. He reports the yellowing of his sclera is chronic He is having normal bowel movements, did not noticed the color and no dysuria.     Past medical:   He has a complex past medical history with multiple admissions for pain crisis. He had evidence of choledocholithiasis in 2024 and underwent ERCP with sphincterotomy. He was referred to  for elective cholecystectomy however did not follow up.     Review of Systems  12-point ROS has been reviewed and is negative, except if mentioned otherwise above.    Social History:  Does not drink but does smoke, tobacco and marijuana     Family History:  Family History[1]    Surgical History:  Surgical History[2]    Home Medications  Prescriptions Prior to Admission[3]    Allergies:  Allergies[4]        EXAM     Vitals:    Vitals:    07/16/25 2024 07/16/25 2322 07/17/25 0318 07/17/25 0823   BP: 121/72 124/68 132/72 133/68   BP Location: Right arm Right arm Right arm    Patient Position: Lying Lying Lying Lying   Pulse: 65 86 92 70   Resp: 16 16 16 18   Temp: 36.9 °C (98.4 °F) 36.7 °C (98.1 °F) 36.5 °C (97.7 °F) 36.9 °C (98.4 °F)   TempSrc: Temporal Temporal Temporal Temporal   SpO2: 98% 96% 95% 96%   Weight:       Height:         Failed to redirect to the Timeline version of the REVFS  [employer].    Intake/Output Summary (Last 24 hours) at 7/17/2025 1052  Last data filed at 7/16/2025 2024  Gross per 24 hour   Intake 938.33 ml   Output 350 ml   Net 588.33 ml       Physical Exam   General: well-developed, well-nourished, no acute distress, AAOx3  HEENT: EOM intact, PERRL scleral jaundice   CV: regular rate and rhythm, normal S1/S2, no murmur  Pulm: normal respiratory effort, clear to auscultation bilaterally with no wheezes, rales, rhonchi  Abd: soft, nontender, nondistended, no masses, normoactive bowel sounds  Extremities: warm, no LE edema, jaundice visible on hands and feet.   Neuro: moves all extremities equally, CN II - XII grossly intact  Psych: normal mood and affect  Skin: warm, dry, intact     OBJECTIVE                                                                              Medications     Current Medications[5]                                                                            Labs     Labs:  CBC RFP   Lab Results   Component Value Date    WBC 10.7 07/17/2025    HGB 7.1 (L) 07/17/2025    HCT 19.7 (L) 07/17/2025    MCV 86 07/17/2025     07/17/2025    NEUTROABS 7.36 07/17/2025      Lab Results   Component Value Date     07/17/2025    K 3.9 07/17/2025    CL 99 07/17/2025    CO2 32 07/17/2025    BUN 8 07/17/2025    CREATININE 0.71 07/17/2025     Lab Results   Component Value Date    MG 2.44 (H) 05/17/2025    PHOS 3.5 07/24/2024    CALCIUM 9.3 07/17/2025    ALBUMIN 3.9 07/17/2025         Hepatic Function ABG/VBG   Lab Results   Component Value Date    ALT 20 07/17/2025    AST 46 (H) 07/17/2025    GGT 42 07/17/2025    ALKPHOS 109 07/17/2025     Lab Results   Component Value Date    BILITOT 37.3 (H) 07/17/2025    BILIDIR 16.8 (H) 07/17/2025     Lab Results   Component Value Date    PROTIME 15.3 (H) 07/15/2025    APTT 27 07/15/2025    INR 1.4 (H) 07/15/2025    ALBUMINELP 3.9 12/08/2022     Component      Latest Ref Rng 7/15/2025 7/16/2025 7/17/2025   Retic %      0.5 -  2.0 % 29.1 (H)  29.6 (H)  32.1 (H)    Retic Absolute      0.022 - 0.118 x10*6/uL 0.721 (H)  0.776 (H)  0.735 (H)       Legend:  (H) High Lab Results   Component Value Date    LACTATE 1.2 09/22/2024                                                                                        Imaging           RUQ U/S (7/17/25)  Hepatomegaly with diffuse echogenicity compatible with diffuse  fatty infiltration.  2. Small volume biliary sludge and gallstones without evidence of  cholecystitis or biliary dilation.                                                                         GI Procedures   Endoscopic Retrograde Cholangiopancreatography (ERCP) (7/18/2024)  Filling defect consistent with sludge was visualized in the distal common bile duct. Complete 10 mm biliary sphincterotomy was performed using a sphincterotome. No bleeding was noted at the procedure site. Multiple sweeps were performed in the common bile duct using a 8 mm balloon. Sludge and stones were removed, achieving complete clearance. Bile was noted to be draining at the end of the procedure. The pancreatic duct was not cannulated.      ASSESSMENT / PLAN                  ASSESSMENT/PLAN:  Joellen Narayan is a 24 y.o. male PMH nodular lymphocyte predominant Hodgkins lymphoma (NLPHL) (s/p rituxan/prednisone, not on current active chemo), HbSS sickle cell disease admitted on 7/14/25 for back and chest pain c/f acute pain crisis. GI is consulted for hyperbilirubinemia.     #Hyperbilirubinemia, cholelithiasis  :: T bili: 37.3, D BILI: 16.8 (baseline: T bili 6-8, D bili :1-2), LDH : 709 (baseline: 400-500)  :: s/p ERCP with sphincterotomy 7/18/24    :: RUQ U/S shows normal CBD (0.3 cm) and cholelithiasis.    Recommendations:  - consult GS for cholecystectomy + IOC  - no role for ERCP since he has already had a sphincterotomy done.   - hyperbilirubinemia is related to hemolysis.   - GI will sign off    To be discussed with attending on service, Dr. Dell North  .  Recommendations not finalized until attending attestation.    Patient was seen discussed with Dr. Dell North.                   [1]   Family History  Problem Relation Name Age of Onset    Sickle cell trait Mother      Diabetes Mother      Sickle cell trait Father      Lung cancer Brother     [2]   Past Surgical History:  Procedure Laterality Date    CT GUIDED PERCUTANEOUS ABDOMINAL RETROPERITONEUM BIOPSY  11/30/2023    CT GUIDED PERCUTANEOUS BIOPSY RETROPERITONEUM 11/30/2023 Chrystal Ridley MD [facility]    CT GUIDED PERCUTANEOUS BIOPSY LYMPH NODE SUPERFICIAL  11/18/2022    CT GUIDED PERCUTANEOUS BIOPSY LYMPH NODE SUPERFICIAL 11/18/2022 DOCTOR OFFICE [facility]GAC[facility]    IR CVC TUNNELED  6/21/2022    IR CVC TUNNELED 6/21/2022 [facility] CLINICAL [facility]GAC[facility]   [3]   Medications Prior to Admission   Medication Sig Dispense Refill Last Dose/Taking    folic acid (Folvite) 1 mg tablet Take 1 tablet (1 mg) by mouth once daily. (Patient not taking: Reported on 7/15/2025) 90 tablet 0 Not Taking    oxygen (O2) gas therapy Inhale 1 each (1 L) once daily at bedtime.       [DISCONTINUED] aspirin 81 mg chewable tablet Chew 1 tablet (81 mg) 2 times a day. 84 tablet 0     [DISCONTINUED] pseudoephedrine (Sudogest) 60 mg tablet Take 1 tablet (60 mg) by mouth every 8 hours if needed for congestion for up to 10 days. (Patient not taking: Reported on 5/3/2025) 30 tablet 0    [4]   Allergies  Allergen Reactions    Cefepime Hallucinations    Amoxicillin Hives    Ceftriaxone Hives and Rash   [5]   Current Facility-Administered Medications:     acetaminophen (Tylenol) tablet 650 mg, 650 mg, oral, Once, RAVI Tom    dextrose 5%-0.45 % sodium chloride infusion, 75 mL/hr, intravenous, Continuous, RAVI Angel    diphenhydrAMINE (BENADryl) capsule 25 mg, 25 mg, oral, q6h PRN, RAVI Tom, 25 mg at 07/16/25 2223    diphenhydrAMINE (BENADryl) capsule 25 mg, 25 mg, oral, Once, RAVI Tom    enoxaparin (Lovenox)  syringe 40 mg, 40 mg, subcutaneous, Daily, RAVI Tom    folic acid (Folvite) tablet 1 mg, 1 mg, oral, Daily, RAVI Tom, 1 mg at 07/17/25 0831    HYDROmorphone PF (Dilaudid) injection 5 mg, 5 mg, intravenous, q2h PRN, RAVI Tom, 5 mg at 07/17/25 0832    ketorolac (Toradol) injection 30 mg, 30 mg, intravenous, q6h REYNALDO, RAVI Tom, 30 mg at 07/17/25 0641    levoFLOXacin (Levaquin) 750 mg in dextrose 5%  mL, 750 mg, intravenous, q24h, Lorri Belle, APRN-CNP, Stopped at 07/16/25 1349    lidocaine (Xylocaine) 10 mg/mL (1 %) injection 5 mL, 5 mL, infiltration, Once, RAVI Tom    lidocaine 4 % patch 2 patch, 2 patch, transdermal, Daily, RAVI Tom, 2 patch at 07/14/25 1300    ondansetron (Zofran) tablet 8 mg, 8 mg, oral, q6h PRN, RAVI Tom, 8 mg at 07/16/25 2223    oxyCODONE ER (OxyCONTIN) 12 hr tablet 30 mg, 30 mg, oral, q12h REYNALDO, RAVI Tom, 30 mg at 07/17/25 0831    oxygen (O2) therapy, , inhalation, Continuous - Inhalation, Jean Mcgregor MD, 2 L/min at 07/17/25 0832    pantoprazole (ProtoNix) EC tablet 40 mg, 40 mg, oral, Daily before breakfast, RAVI Tom, 40 mg at 07/17/25 0641    polyethylene glycol (Glycolax, Miralax) packet 17 g, 17 g, oral, Daily, RAVI Tom, 17 g at 07/17/25 0831

## 2025-07-18 ENCOUNTER — APPOINTMENT (OUTPATIENT)
Dept: RADIOLOGY | Facility: HOSPITAL | Age: 25
DRG: 811 | End: 2025-07-18
Payer: COMMERCIAL

## 2025-07-18 LAB
ALBUMIN SERPL BCP-MCNC: 3.7 G/DL (ref 3.4–5)
ALP SERPL-CCNC: 119 U/L (ref 33–120)
ALT SERPL W P-5'-P-CCNC: 32 U/L (ref 10–52)
ANION GAP SERPL CALC-SCNC: 12 MMOL/L (ref 10–20)
AST SERPL W P-5'-P-CCNC: 61 U/L (ref 9–39)
BASOPHILS # BLD AUTO: 0.02 X10*3/UL (ref 0–0.1)
BASOPHILS NFR BLD AUTO: 0.2 %
BILIRUB DIRECT SERPL-MCNC: 22.5 MG/DL (ref 0–0.3)
BILIRUB SERPL-MCNC: 40.9 MG/DL (ref 0–1.2)
BLOOD EXPIRATION DATE: NORMAL
BUN SERPL-MCNC: 8 MG/DL (ref 6–23)
CALCIUM SERPL-MCNC: 9 MG/DL (ref 8.6–10.6)
CARBOXYTHC UR-MCNC: >500 NG/ML
CHLORIDE SERPL-SCNC: 101 MMOL/L (ref 98–107)
CO2 SERPL-SCNC: 30 MMOL/L (ref 21–32)
CREAT SERPL-MCNC: 0.79 MG/DL (ref 0.5–1.3)
DISPENSE STATUS: NORMAL
EGFRCR SERPLBLD CKD-EPI 2021: >90 ML/MIN/1.73M*2
EOSINOPHIL # BLD AUTO: 0.76 X10*3/UL (ref 0–0.7)
EOSINOPHIL NFR BLD AUTO: 7.8 %
ERYTHROCYTE [DISTWIDTH] IN BLOOD BY AUTOMATED COUNT: 22.9 % (ref 11.5–14.5)
GLUCOSE SERPL-MCNC: 84 MG/DL (ref 74–99)
HCT VFR BLD AUTO: 19.4 % (ref 41–52)
HGB BLD-MCNC: 6.9 G/DL (ref 13.5–17.5)
HGB RETIC QN: 33 PG (ref 28–38)
IMM GRANULOCYTES # BLD AUTO: 0.13 X10*3/UL (ref 0–0.7)
IMM GRANULOCYTES NFR BLD AUTO: 1.3 % (ref 0–0.9)
IMMATURE RETIC FRACTION: 25.9 %
LDH SERPL L TO P-CCNC: 626 U/L (ref 84–246)
LYMPHOCYTES # BLD AUTO: 1.6 X10*3/UL (ref 1.2–4.8)
LYMPHOCYTES NFR BLD AUTO: 16.4 %
MCH RBC QN AUTO: 31.7 PG (ref 26–34)
MCHC RBC AUTO-ENTMCNC: 35.6 G/DL (ref 32–36)
MCV RBC AUTO: 89 FL (ref 80–100)
MONOCYTES # BLD AUTO: 1.22 X10*3/UL (ref 0.1–1)
MONOCYTES NFR BLD AUTO: 12.5 %
NEUTROPHILS # BLD AUTO: 6.02 X10*3/UL (ref 1.2–7.7)
NEUTROPHILS NFR BLD AUTO: 61.8 %
NRBC BLD-RTO: 11.4 /100 WBCS (ref 0–0)
PLATELET # BLD AUTO: 250 X10*3/UL (ref 150–450)
POTASSIUM SERPL-SCNC: 3.7 MMOL/L (ref 3.5–5.3)
PRODUCT BLOOD TYPE: 1700
PRODUCT CODE: NORMAL
PROT SERPL-MCNC: 5.3 G/DL (ref 6.4–8.2)
RBC # BLD AUTO: 2.18 X10*6/UL (ref 4.5–5.9)
RETICS #: 0.65 X10*6/UL (ref 0.02–0.12)
RETICS/RBC NFR AUTO: 29.7 % (ref 0.5–2)
SODIUM SERPL-SCNC: 139 MMOL/L (ref 136–145)
UNIT ABO: NORMAL
UNIT NUMBER: NORMAL
UNIT RH: NORMAL
UNIT VOLUME: 350
WBC # BLD AUTO: 9.8 X10*3/UL (ref 4.4–11.3)
XM INTEP: NORMAL

## 2025-07-18 PROCEDURE — 2500000001 HC RX 250 WO HCPCS SELF ADMINISTERED DRUGS (ALT 637 FOR MEDICARE OP)

## 2025-07-18 PROCEDURE — 2500000005 HC RX 250 GENERAL PHARMACY W/O HCPCS: Performed by: EMERGENCY MEDICINE

## 2025-07-18 PROCEDURE — P9040 RBC LEUKOREDUCED IRRADIATED: HCPCS

## 2025-07-18 PROCEDURE — 85045 AUTOMATED RETICULOCYTE COUNT: CPT

## 2025-07-18 PROCEDURE — 2500000004 HC RX 250 GENERAL PHARMACY W/ HCPCS (ALT 636 FOR OP/ED): Mod: JZ,TB

## 2025-07-18 PROCEDURE — 36430 TRANSFUSION BLD/BLD COMPNT: CPT

## 2025-07-18 PROCEDURE — 82248 BILIRUBIN DIRECT: CPT

## 2025-07-18 PROCEDURE — 99232 SBSQ HOSP IP/OBS MODERATE 35: CPT

## 2025-07-18 PROCEDURE — 80053 COMPREHEN METABOLIC PANEL: CPT

## 2025-07-18 PROCEDURE — 71045 X-RAY EXAM CHEST 1 VIEW: CPT | Performed by: STUDENT IN AN ORGANIZED HEALTH CARE EDUCATION/TRAINING PROGRAM

## 2025-07-18 PROCEDURE — 85660 RBC SICKLE CELL TEST: CPT

## 2025-07-18 PROCEDURE — 86902 BLOOD TYPE ANTIGEN DONOR EA: CPT

## 2025-07-18 PROCEDURE — 85025 COMPLETE CBC W/AUTO DIFF WBC: CPT

## 2025-07-18 PROCEDURE — 83615 LACTATE (LD) (LDH) ENZYME: CPT

## 2025-07-18 PROCEDURE — 1170000001 HC PRIVATE ONCOLOGY ROOM DAILY

## 2025-07-18 PROCEDURE — 71045 X-RAY EXAM CHEST 1 VIEW: CPT

## 2025-07-18 PROCEDURE — 2500000004 HC RX 250 GENERAL PHARMACY W/ HCPCS (ALT 636 FOR OP/ED)

## 2025-07-18 RX ORDER — LACTULOSE 10 G/15ML
20 SOLUTION ORAL ONCE
Status: COMPLETED | OUTPATIENT
Start: 2025-07-18 | End: 2025-07-18

## 2025-07-18 RX ORDER — ACETAMINOPHEN 325 MG/1
650 TABLET ORAL ONCE
Status: COMPLETED | OUTPATIENT
Start: 2025-07-18 | End: 2025-07-18

## 2025-07-18 RX ORDER — PROCHLORPERAZINE EDISYLATE 5 MG/ML
10 INJECTION INTRAMUSCULAR; INTRAVENOUS EVERY 6 HOURS PRN
Status: DISCONTINUED | OUTPATIENT
Start: 2025-07-18 | End: 2025-07-23

## 2025-07-18 RX ORDER — DIPHENHYDRAMINE HCL 25 MG
25 CAPSULE ORAL ONCE
Status: COMPLETED | OUTPATIENT
Start: 2025-07-18 | End: 2025-07-18

## 2025-07-18 RX ADMIN — HYDROMORPHONE HYDROCHLORIDE 5 MG: 2 INJECTION, SOLUTION INTRAMUSCULAR; INTRAVENOUS; SUBCUTANEOUS at 09:51

## 2025-07-18 RX ADMIN — SALINE NASAL SPRAY 1 SPRAY: 1.5 SOLUTION NASAL at 09:54

## 2025-07-18 RX ADMIN — KETOROLAC TROMETHAMINE 30 MG: 30 INJECTION, SOLUTION INTRAMUSCULAR; INTRAVENOUS at 01:05

## 2025-07-18 RX ADMIN — ACETAMINOPHEN 650 MG: 325 TABLET ORAL at 21:38

## 2025-07-18 RX ADMIN — FOLIC ACID 1 MG: 1 TABLET ORAL at 09:51

## 2025-07-18 RX ADMIN — SALINE NASAL SPRAY 1 SPRAY: 1.5 SOLUTION NASAL at 13:23

## 2025-07-18 RX ADMIN — Medication 2 L/MIN: at 09:52

## 2025-07-18 RX ADMIN — LACTULOSE 20 G: 20 SOLUTION ORAL at 19:21

## 2025-07-18 RX ADMIN — DIPHENHYDRAMINE HYDROCHLORIDE 25 MG: 25 CAPSULE ORAL at 13:15

## 2025-07-18 RX ADMIN — ONDANSETRON HYDROCHLORIDE 8 MG: 8 TABLET, FILM COATED ORAL at 21:25

## 2025-07-18 RX ADMIN — HYDROMORPHONE HYDROCHLORIDE 5 MG: 2 INJECTION, SOLUTION INTRAMUSCULAR; INTRAVENOUS; SUBCUTANEOUS at 05:07

## 2025-07-18 RX ADMIN — KETOROLAC TROMETHAMINE 30 MG: 30 INJECTION, SOLUTION INTRAMUSCULAR; INTRAVENOUS at 09:50

## 2025-07-18 RX ADMIN — OXYCODONE HYDROCHLORIDE 30 MG: 20 TABLET, FILM COATED, EXTENDED RELEASE ORAL at 09:51

## 2025-07-18 RX ADMIN — LEVOFLOXACIN 750 MG: 5 INJECTION, SOLUTION INTRAVENOUS at 16:42

## 2025-07-18 RX ADMIN — HYDROMORPHONE HYDROCHLORIDE 5 MG: 2 INJECTION, SOLUTION INTRAMUSCULAR; INTRAVENOUS; SUBCUTANEOUS at 16:25

## 2025-07-18 RX ADMIN — HYDROMORPHONE HYDROCHLORIDE 5 MG: 2 INJECTION, SOLUTION INTRAMUSCULAR; INTRAVENOUS; SUBCUTANEOUS at 07:06

## 2025-07-18 RX ADMIN — OXYCODONE HYDROCHLORIDE 30 MG: 20 TABLET, FILM COATED, EXTENDED RELEASE ORAL at 21:17

## 2025-07-18 RX ADMIN — ACETAMINOPHEN 650 MG: 325 TABLET ORAL at 13:15

## 2025-07-18 RX ADMIN — HYDROMORPHONE HYDROCHLORIDE 5 MG: 2 INJECTION, SOLUTION INTRAMUSCULAR; INTRAVENOUS; SUBCUTANEOUS at 13:16

## 2025-07-18 RX ADMIN — HYDROMORPHONE HYDROCHLORIDE 5 MG: 2 INJECTION, SOLUTION INTRAMUSCULAR; INTRAVENOUS; SUBCUTANEOUS at 01:03

## 2025-07-18 RX ADMIN — HYDROMORPHONE HYDROCHLORIDE 5 MG: 2 INJECTION, SOLUTION INTRAMUSCULAR; INTRAVENOUS; SUBCUTANEOUS at 03:02

## 2025-07-18 RX ADMIN — Medication 2 L/MIN: at 21:18

## 2025-07-18 RX ADMIN — HYDROMORPHONE HYDROCHLORIDE 5 MG: 2 INJECTION, SOLUTION INTRAMUSCULAR; INTRAVENOUS; SUBCUTANEOUS at 22:27

## 2025-07-18 RX ADMIN — DEXTROSE AND SODIUM CHLORIDE 75 ML/HR: 5; .45 INJECTION, SOLUTION INTRAVENOUS at 08:23

## 2025-07-18 RX ADMIN — HYDROMORPHONE HYDROCHLORIDE 5 MG: 2 INJECTION, SOLUTION INTRAMUSCULAR; INTRAVENOUS; SUBCUTANEOUS at 18:47

## 2025-07-18 RX ADMIN — PANTOPRAZOLE SODIUM 40 MG: 40 TABLET, DELAYED RELEASE ORAL at 07:06

## 2025-07-18 ASSESSMENT — PAIN - FUNCTIONAL ASSESSMENT
PAIN_FUNCTIONAL_ASSESSMENT: 0-10

## 2025-07-18 ASSESSMENT — PAIN SCALES - GENERAL
PAINLEVEL_OUTOF10: 8
PAINLEVEL_OUTOF10: 10 - WORST POSSIBLE PAIN
PAINLEVEL_OUTOF10: 7
PAINLEVEL_OUTOF10: 10 - WORST POSSIBLE PAIN
PAINLEVEL_OUTOF10: 6
PAINLEVEL_OUTOF10: 8
PAINLEVEL_OUTOF10: 6
PAINLEVEL_OUTOF10: 8
PAINLEVEL_OUTOF10: 7
PAINLEVEL_OUTOF10: 6
PAINLEVEL_OUTOF10: 8
PAINLEVEL_OUTOF10: 6
PAINLEVEL_OUTOF10: 7
PAINLEVEL_OUTOF10: 9

## 2025-07-18 NOTE — PROGRESS NOTES
Joellen Narayan is a 24 y.o. male on day 4 of admission presenting with Sickle cell pain crisis (Multi).    Subjective   Patient resting in bed, states pain is feeling better today, located in lower back, 6/10. Denies chest pain, shortness of breath, pain with deep breath. Discussed uptrending Bilirubin, downtrending hemoglobin, discussed setting patient up for RBC exchange per Dr. Cruz's recommendations, patient continues to refuse, states does not want an apheresis line placed. Discussed danger of increasing bilirubin and concern for potential worsening clinical status should labs continue to decline, patient verbalized understanding and continues to decline RBC exchange. Discussed simple blood transfusion, patient agreeable. Denies headache, vision change, fever, chills, n/v/c/d, weakness, numbness, tingling, dizziness, lightheadedness.       Objective     Physical Exam  Constitutional:       Appearance: Normal appearance.   HENT:      Head: Normocephalic.      Nose:      Comments: Dried blood in bilateral nares     Mouth/Throat:      Mouth: Mucous membranes are moist.      Pharynx: Oropharynx is clear.     Eyes:      General: Scleral icterus present.      Extraocular Movements: Extraocular movements intact.      Pupils: Pupils are equal, round, and reactive to light.       Cardiovascular:      Rate and Rhythm: Normal rate and regular rhythm.      Pulses: Normal pulses.      Heart sounds: Normal heart sounds.   Pulmonary:      Effort: Pulmonary effort is normal.      Breath sounds: Normal breath sounds.   Abdominal:      General: Abdomen is flat.      Palpations: Abdomen is soft.      Tenderness: There is no abdominal tenderness.     Musculoskeletal:         General: Normal range of motion.      Cervical back: Normal range of motion.     Skin:     General: Skin is warm and dry.      Capillary Refill: Capillary refill takes less than 2 seconds.     Neurological:      General: No focal deficit present.      Mental  "Status: He is alert and oriented to person, place, and time.     Psychiatric:         Mood and Affect: Mood normal.         Behavior: Behavior normal.         Last Recorded Vitals  Blood pressure 125/73, pulse 79, temperature 36.3 °C (97.3 °F), temperature source Temporal, resp. rate 18, height 1.88 m (6' 2.02\"), weight 70.3 kg (155 lb), SpO2 93%.  Intake/Output last 3 Shifts:  I/O last 3 completed shifts:  In: 150 (2.1 mL/kg) [IV Piggyback:150]  Out: 350 (5 mL/kg) [Urine:350 (0.1 mL/kg/hr)]  Weight: 70.3 kg     Relevant Results                 Scheduled medications  Scheduled Medications[1]  Continuous medications  Continuous Medications[2]  PRN medications  PRN Medications[3]  Results for orders placed or performed during the hospital encounter of 07/14/25 (from the past 24 hours)   CBC and Auto Differential   Result Value Ref Range    WBC 9.8 4.4 - 11.3 x10*3/uL    nRBC 11.4 (H) 0.0 - 0.0 /100 WBCs    RBC 2.18 (L) 4.50 - 5.90 x10*6/uL    Hemoglobin 6.9 (L) 13.5 - 17.5 g/dL    Hematocrit 19.4 (L) 41.0 - 52.0 %    MCV 89 80 - 100 fL    MCH 31.7 26.0 - 34.0 pg    MCHC 35.6 32.0 - 36.0 g/dL    RDW 22.9 (H) 11.5 - 14.5 %    Platelets 250 150 - 450 x10*3/uL    Neutrophils % 61.8 40.0 - 80.0 %    Immature Granulocytes %, Automated 1.3 (H) 0.0 - 0.9 %    Lymphocytes % 16.4 13.0 - 44.0 %    Monocytes % 12.5 2.0 - 10.0 %    Eosinophils % 7.8 0.0 - 6.0 %    Basophils % 0.2 0.0 - 2.0 %    Neutrophils Absolute 6.02 1.20 - 7.70 x10*3/uL    Immature Granulocytes Absolute, Automated 0.13 0.00 - 0.70 x10*3/uL    Lymphocytes Absolute 1.60 1.20 - 4.80 x10*3/uL    Monocytes Absolute 1.22 (H) 0.10 - 1.00 x10*3/uL    Eosinophils Absolute 0.76 (H) 0.00 - 0.70 x10*3/uL    Basophils Absolute 0.02 0.00 - 0.10 x10*3/uL   Lactate Dehydrogenase   Result Value Ref Range     (H) 84 - 246 U/L   Reticulocytes   Result Value Ref Range    Retic % 29.7 (H) 0.5 - 2.0 %    Retic Absolute 0.648 (H) 0.022 - 0.118 x10*6/uL    Reticulocyte " Hemoglobin 33 28 - 38 pg    Immature Retic fraction 25.9 (H) <=16.0 %   Comprehensive metabolic panel   Result Value Ref Range    Glucose 84 74 - 99 mg/dL    Sodium 139 136 - 145 mmol/L    Potassium 3.7 3.5 - 5.3 mmol/L    Chloride 101 98 - 107 mmol/L    Bicarbonate 30 21 - 32 mmol/L    Anion Gap 12 10 - 20 mmol/L    Urea Nitrogen 8 6 - 23 mg/dL    Creatinine 0.79 0.50 - 1.30 mg/dL    eGFR >90 >60 mL/min/1.73m*2    Calcium 9.0 8.6 - 10.6 mg/dL    Albumin 3.7 3.4 - 5.0 g/dL    Alkaline Phosphatase 119 33 - 120 U/L    Total Protein 5.3 (L) 6.4 - 8.2 g/dL    AST 61 (H) 9 - 39 U/L    Bilirubin, Total 40.9 (H) 0.0 - 1.2 mg/dL    ALT 32 10 - 52 U/L   Bilirubin, direct   Result Value Ref Range    Bilirubin, Direct 22.5 (H) 0.0 - 0.3 mg/dL     *Note: Due to a large number of results and/or encounters for the requested time period, some results have not been displayed. A complete set of results can be found in Results Review.     US right upper quadrant  Result Date: 7/17/2025  Interpreted By:  Jamshid Enrique and Mercado Amiel STUDY: US RIGHT UPPER QUADRANT;  7/17/2025 10:46 am   INDICATION: Signs/Symptoms:elevated bilirubin, sickle cell disease.     COMPARISON: None.   ACCESSION NUMBER(S): [id number]   ORDERING CLINICIAN: DULCE CARMEN   TECHNIQUE: Multiple images of the right upper quadrant were obtained.   FINDINGS: LIVER: The liver measures 21.8 cm and is diffusely echogenic in appearance, consistent with diffuse fatty infiltration. The resulting increased beam attenuation thereby limiting evaluation of the liver for focal lesions. Within the limitations, no focal lesions are seen.     GALLBLADDER: There is layering small volume biliary sludge and cholelithiasis. The gallbladder wall thickness is 0.3 cm. Sonographic Roman's sign is negative.     BILE DUCTS: No evidence of intra or extrahepatic biliary dilatation is identified; the common bile duct measures 0.5 cm.   PANCREAS: The visualized pancreas is  unremarkable in appearance.   RIGHT KIDNEY: The right kidney measures 11.8 cm in length. The renal cortical echogenicity and thickness are within normal limit.  No hydronephrosis or renal calculi are seen.       1. Hepatomegaly with diffuse echogenicity compatible with diffuse fatty infiltration. 2. Small volume biliary sludge and gallstones without evidence of cholecystitis or biliary dilation.   I have reviewed the images/study and I agree with the findings as stated by Khoa Reid MD (PGY-3).   MACRO: None   Signed by: Jamshid Enrique 7/17/2025 11:01 AM Dictation workstation:   [id number]    CT angio chest for pulmonary embolism  Result Date: 7/16/2025  Interpreted By:  Rogelio Tsang, STUDY: CT ANGIO CHEST FOR PULMONARY EMBOLISM;  7/16/2025 10:57 am   INDICATION: Signs/Symptoms:chest pain, increased oxygen requirement.     COMPARISON: 05/03/2025.   ACCESSION NUMBER(S): [id number]   ORDERING CLINICIAN: DULCE CARMEN   TECHNIQUE: Helical data acquisition of the chest was obtained after intravenous administration of 55 ML Omnipaque 350, as per PE protocol. Images were reformatted in coronal and sagittal planes. Axial and coronal maximum intensity projection (MIP) images were created and reviewed.   FINDINGS: POTENTIAL LIMITATIONS OF THE STUDY: None   HEART AND VESSELS: There are no discrete filling defects within main pulmonary artery and its branches to suggest acute pulmonary embolism. Main pulmonary artery and its branches are normal in caliber.   The thoracic aorta normal in course and caliber. No coronary artery calcifications are seen. Please note, the study is not optimized for evaluation of coronary arteries.   The cardiac chambers are not enlarged.   There is no pericardial effusion seen.   MEDIASTINUM AND ANA, LOWER NECK AND AXILLA: The visualized thyroid gland is within normal limits. No evidence of thoracic lymphadenopathy by CT criteria. There is prominent anterior mediastinal thymic tissue  which may represent a component of thymic rebound. Esophagus appears within normal limits as seen.   LUNGS AND AIRWAYS: The trachea and central airways are patent. No endobronchial lesion is seen.   There is slight interval increase in left basilar opacities.     UPPER ABDOMEN: The visualized subdiaphragmatic structures demonstrate no remarkable findings. There is atrophic splenic tissue.     CHEST WALL AND OSSEOUS STRUCTURES: Chest wall is within normal limits. No acute osseous pathology.There are no suspicious osseous lesions.       1. There is no evidence of pulmonary embolism. 2. There is slight interval increase in left basilar opacities which may be atelectatic and related to parenchymal changes of sickle crisis. Correlate with any concern for developing left basilar infiltrate/pneumonia.   MACRO: None   Signed by: Rogelio Tsang 7/16/2025 11:35 AM Dictation workstation:   [id number]                 Assessment & Plan  Sickle cell pain crisis (Multi)    Joellen Narayan is a 24 y.o. 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 Cxr w/o evidence of acute process 7/14 and 7/15. EKG WNL. Hemolysis labs significantly elevated 7/15 and pain worsening reflecting vaso-occlusive crisis. 7/15 CT Spine w/o acute process. Started on IV dilaudid for pain control. S/p 1 unit pRBCs on 7/15 with poor incrementation in hgb, bili increased to 27 (7/16). Dr. Cruz recommends RBC exchange, however patient refusing at this time. CT PE negative for acute PE, does show increase in left basilar  opacities concerning for pneumonia, started on IV Levaquin (7/16-). Bilirubin continuing to increase to 37.3 on 7/17 and 40.9 on 7/18. On 7/18 patient continues to refuse RBC exchange. GI consulted 7/17, recs consulting general surgery for possible cholecystectomy. General surgery consulted 7/17, discussed cholecystectomy with patient, patient declining at this time, no acute interventions. Hgb 6.9 (7/18), 1 unit pRBC ordered.  DC home resumed nox O2 pending improvement in pain and acute sickle cell crisis.     Updates 7/18:  - Bili up to 40.9, hgb 6.9, Dr. Cruz aware, plan for 1 unit pRBC as patient continues to refuse RBC exchange.  - RUQ ultrasound shows hepatomegaly, small volume biliary sludge and gallstones without evidence of cholecystitis or biliary dilation.  - GI consulted for increasing bilirubin, rec consulting general surgery; hyperbilirubinemia likely 2/2 hemolysis in setting of acute sickle cell crisis  - Acute care surgery consulted for possible cholecystectomy, discussed gallbladder removal with patient, patient declining at this time, no acute interventions recommended.     # Hgb SS disease acute on chronic pain   # C/f acute chest  # pneumonia  - OARRS reviewed on admission, no aberrancies noted (last filled 5/22 oxy ER qty 28 tabs, qty 14 days, and 5/16 oxy IR qty 28 tabs 7 days)  - No carepath available   - Primary hematologist: Dr. Cruz - updated 7/16, 7/17, 7/18, recommends RBC exchange transfusion, however patient refusing on 7/16, 7/17, and 7/18, does not want apheresis line placed   - pt reports he has no home oxycodone at home and has been calling refill line and pharmacy without success or hearing back   - 7/14 and 7/15 CXR w/o evidence of acute process  - 7/15 CT Spine w/o acute process   - EKG in ED showed sinus rhythm, no ST elevation or ischemic changes, trop pending   - Hemolysis labs significantly above baseline reflecting a vaso-occlusive crisis  - Hg 7.8 (7/15), (baseline ~7-8);  s/p 1 unit pRBC 7/15->8.2 (7/16)-> 7.1 (7/17)->6.9 (7/18); discussed with Dr. Cruz on 7/18, plan for 1 unit pRBCs 7/18  - Tbili 10.4 (7/14) BL ~ 8-10;->27 (7/16)->37.3 (7/17)->40.9 (7/18); direct bili 1.2 (7/14)->8.4 (7/16)->16.8 (7/17)->22.5 (7/18)  -  (7/14)->846 (7/16)->709 (7/17)->626 (7/18)  - WBC 15.5 (7/16)-> 10.7 (7/17)->9.8 (7/18)  - Exchange labs drawn (7/15)  - started 4mg IVP dilaudid q2 PRN severe pain (7/15- 7/16), increased to 5mg (7/15-), offered PCA but pt declined 7/15   - started toradol 30mg q6 sched x3 days with PPI support   - re-started home oxycontin ER 30 mg BID on admit   - Continue lidocaine patches  - c/w home folic acid 1mg daily   - Hg S 75% (7/14); repeat draw pending (7/19)  - utox + MJ (7/14)   - CT PE negative for acute PE, does show increasing left basilar opacities concerning for pneumonia, started Levaquin 750 mg IV (7/16-)  - s/p D51/2  mL/hr x 1 day (7/16); s/p D51/2NS 75 mL/hr x 1 day (7/17)  - supportive care: lidocaine patches, hot packs   - bowel regimen for opioid induced constipation, zofran for opioid induced nausea, and benadryl for opioid induced pruritis      # Hx choledocholithiasis 7/2024  # hyperbilirubinemia   - Worsening hemolysis in setting of vaso-occlusive crisis could be contributing to increased hyperbilirubinemia    - no abdominal pain, afebrile, leukocytosis increased from baseline and nausea/vomiting x2 days, now improving (7/17)  - July 2024 s/p ERCP with biliary sphincterotomy where sludge and stones were removed, achieving complete clearance    - 1/31/25 was planned for cholecystectomy with Dr. Dove but no show on day of surgery and again 2/13 and 2/27    - Tbili uptrending (7/15) 14 ->27 (7/16)-> 37.3 (7/17)->40.9 (7/18), direct bili 1.2 (7/15)->8.4 (7/16)->16.8 (7/17)->22.5 (7/19)  - S/p D5 1/2NS @75ml/h x24h (7/15), increased to 100 mL/hr (7/16), 75 mL/hr x 1 day (7/17)  - RUQ ultrasound (7/17) shows hepatomegaly, small volume biliary  sludge and gallstones without evidence of cholecystitis or biliary dilation.  - GI consulted (7/17), ec consulting general surgery; hyperbilirubinemia likely 2/2 hemolysis in setting of acute sickle cell crisis  - ACS consulted (7/17) for possible cholecystectomy, discussed gallbladder removal with patient, patient declining at this time, no acute interventions recommended.     # Hx recent Septic Arthritis  R elbow   - Ortho surgeon: Dr. Tello   - 5/9 MRI w/anesthesia R radius/humerus showing concern for septic arthritis, osteomyelitis, myositis, severe muscle and subcutaneous soft tissue edema, and possible cellulitis   - 5/10 OR s/p R elbow I&D with ortho    - s/p IV daptomycin 6mg/kg q24h and ertapenem 1g q24h until 6/20/25 per ID  - 6/9 Most recent Ortho visit, sutures removed, cont to work on ROM, pt declining PT, fuv in 6-8 weeks for xrays of R elbow, upcoming 8/18      # Hx of nocturnal oxygen dependence   - On 2-3L at night, however patient reports daytime hypoxia as well in the past, currently on 2L      # Hx Priapism   - No active issues on admit    - Hx previously drained by urology    - Continue home Sudafed 30mg daily PRN priapism     # Nodular lymphocyte predominant Hodgkins lymphoma (NLPHL)     - Follows with Dr. Stoll   - s/p Rituxan and prednisone q3 weeks (C1 1/18/24, C2 2/8/24, Missed C3 d/t sickle cell pain crisis, C4 4/4/24, C5 5/16/24, C6 6/7/24)    - 3/13 No show to onc appt    - 4/9 PET showed interval development of new FDG focus in mid abdomen c/w recurrence   - 6/26 OP with Dr. Stoll, discussed with patient and his mother re; treatment options (including Rituxan maintenance) vs observation, pt deciding options   - due for repeat CT/PET 7/15, will need rescheduled outpatient      # Prophy   - s/p x6 weeks ASA 81mg BID postop, stopped 6/21  - lovenox   - SCDs, encouraged ambulation       # dispo    - Full code  - DC home resumed noc O2 pending improvement in pain  - FUV 7/28 sickle  cell, 8/18 Pulm, 8/18 Ortho, 11/6 Dr. Stoll     Assessment and plan as above discussed with attending physician Dr. Parnell.        I spent 60 minutes in the professional and overall care of this patient.      Lorri Belle, APRN-CNP           [1] acetaminophen, 650 mg, oral, Once  acetaminophen, 650 mg, oral, Once  diphenhydrAMINE, 25 mg, oral, Once  diphenhydrAMINE, 25 mg, oral, Once  enoxaparin, 40 mg, subcutaneous, Daily  folic acid, 1 mg, oral, Daily  levoFLOXacin, 750 mg, intravenous, q24h  lidocaine, 5 mL, infiltration, Once  lidocaine, 2 patch, transdermal, Daily  oxyCODONE ER, 30 mg, oral, q12h REYNALDO  oxygen, , inhalation, Continuous - Inhalation  pantoprazole, 40 mg, oral, Daily before breakfast  polyethylene glycol, 17 g, oral, Daily  [2] dextrose 5%-0.45 % sodium chloride, 75 mL/hr, Last Rate: 75 mL/hr (07/18/25 0823)  [3] PRN medications: diphenhydrAMINE, HYDROmorphone, ondansetron, sodium chloride

## 2025-07-18 NOTE — CARE PLAN
The clinical goals for the shift include pt will remain HDS and VSS throughout shift 7/18/25 by 1900.      Problem: Pain - Adult  Goal: Verbalizes/displays adequate comfort level or baseline comfort level  Outcome: Progressing     Problem: Safety - Adult  Goal: Free from fall injury  Outcome: Progressing     Problem: Discharge Planning  Goal: Discharge to home or other facility with appropriate resources  Outcome: Progressing     Problem: Chronic Conditions and Co-morbidities  Goal: Patient's chronic conditions and co-morbidity symptoms are monitored and maintained or improved  Outcome: Progressing     Problem: Nutrition  Goal: Nutrient intake appropriate for maintaining nutritional needs  Outcome: Progressing     Problem: Pain  Goal: Takes deep breaths with improved pain control throughout the shift  Outcome: Progressing  Goal: Turns in bed with improved pain control throughout the shift  Outcome: Progressing  Goal: Walks with improved pain control throughout the shift  Outcome: Progressing  Goal: Performs ADL's with improved pain control throughout shift  Outcome: Progressing  Goal: Participates in PT with improved pain control throughout the shift  Outcome: Progressing  Goal: Free from opioid side effects throughout the shift  Outcome: Progressing  Goal: Free from acute confusion related to pain meds throughout the shift  Outcome: Progressing

## 2025-07-18 NOTE — PROGRESS NOTES
Spiritual Care Visit  Spiritual Care Request    Reason for Visit:  Routine Visit: Follow-up  Continue Visiting: Yes     Request Received From:  Referral From: Nurse    Focus of Care:  Visited With: Patient       Refer to :  Referral To:        Spiritual Care Assessment    Care Provided:  Intended Effects: Demonstrate caring and concern, Journeying with someone in the grief process  Methods: Collaborate with care team member, Encourage self reflection, Encourage sharing of feelings, Offer support  Interventions: Acknowledge current situation, Acknowledge response to difficult experience, Active listening, Discuss coping mechanisms with someone, Identify supportive relationship(s)      Spiritual Care Annotation    Annotation:   visited patient Joellen Narayan. Patient and  known to one another from previous visits. Patient welcomed visit from facility [facility]. Hand  provided before and after visit and a blanket was placed on bed for facility dog to visit on; blanket placed in soiled linen bin afterward. Patient shared he had experienced some of the worst pain that brought him to the ED. He expressed feeling a bit better now. He shared that he has been visiting his nephews and providing care for them as they grieve their dad, patient's brother. Patient shared his own grief, and the support from his brother's friends that has brought comfort.    Patient shared how he has been enjoying his summer, spending time at beaches and exploring new restaurants. Patient was appreciative of care and did not have any needs at this time.  provided care through reflective listening, validation of feelings, and supportive conversation. Spiritual Care remains available as needed/requested.    Rev. Diana Álvarez MDiv, BCC

## 2025-07-19 LAB
ABO GROUP (TYPE) IN BLOOD: NORMAL
ALBUMIN SERPL BCP-MCNC: 4 G/DL (ref 3.4–5)
ALP SERPL-CCNC: 138 U/L (ref 33–120)
ALT SERPL W P-5'-P-CCNC: 41 U/L (ref 10–52)
ANION GAP SERPL CALC-SCNC: 12 MMOL/L (ref 10–20)
ANTIBODY SCREEN: NORMAL
AST SERPL W P-5'-P-CCNC: 62 U/L (ref 9–39)
BASOPHILS # BLD AUTO: 0.03 X10*3/UL (ref 0–0.1)
BASOPHILS NFR BLD AUTO: 0.2 %
BILIRUB DIRECT SERPL-MCNC: 20.1 MG/DL (ref 0–0.3)
BILIRUB SERPL-MCNC: 39.7 MG/DL (ref 0–1.2)
BUN SERPL-MCNC: 5 MG/DL (ref 6–23)
CALCIUM SERPL-MCNC: 9.2 MG/DL (ref 8.6–10.6)
CARDIAC TROPONIN I PNL SERPL HS: 3 NG/L (ref 0–53)
CHLORIDE SERPL-SCNC: 100 MMOL/L (ref 98–107)
CO2 SERPL-SCNC: 31 MMOL/L (ref 21–32)
CREAT SERPL-MCNC: 0.62 MG/DL (ref 0.5–1.3)
EGFRCR SERPLBLD CKD-EPI 2021: >90 ML/MIN/1.73M*2
EOSINOPHIL # BLD AUTO: 0.44 X10*3/UL (ref 0–0.7)
EOSINOPHIL NFR BLD AUTO: 3.5 %
ERYTHROCYTE [DISTWIDTH] IN BLOOD BY AUTOMATED COUNT: 21 % (ref 11.5–14.5)
GLUCOSE SERPL-MCNC: 87 MG/DL (ref 74–99)
HCT VFR BLD AUTO: 22.5 % (ref 41–52)
HGB BLD-MCNC: 8.1 G/DL (ref 13.5–17.5)
HGB RETIC QN: 33 PG (ref 28–38)
IMM GRANULOCYTES # BLD AUTO: 0.12 X10*3/UL (ref 0–0.7)
IMM GRANULOCYTES NFR BLD AUTO: 1 % (ref 0–0.9)
IMMATURE RETIC FRACTION: 20.5 %
LDH SERPL L TO P-CCNC: 606 U/L (ref 84–246)
LYMPHOCYTES # BLD AUTO: 1.33 X10*3/UL (ref 1.2–4.8)
LYMPHOCYTES NFR BLD AUTO: 10.6 %
MCH RBC QN AUTO: 30.9 PG (ref 26–34)
MCHC RBC AUTO-ENTMCNC: 36 G/DL (ref 32–36)
MCV RBC AUTO: 86 FL (ref 80–100)
MONOCYTES # BLD AUTO: 1.24 X10*3/UL (ref 0.1–1)
MONOCYTES NFR BLD AUTO: 9.9 %
NEUTROPHILS # BLD AUTO: 9.4 X10*3/UL (ref 1.2–7.7)
NEUTROPHILS NFR BLD AUTO: 74.8 %
NRBC BLD-RTO: 7.9 /100 WBCS (ref 0–0)
PLATELET # BLD AUTO: 298 X10*3/UL (ref 150–450)
POTASSIUM SERPL-SCNC: 3.8 MMOL/L (ref 3.5–5.3)
PROT SERPL-MCNC: 6 G/DL (ref 6.4–8.2)
RBC # BLD AUTO: 2.62 X10*6/UL (ref 4.5–5.9)
RETICS #: 0.57 X10*6/UL (ref 0.02–0.12)
RETICS/RBC NFR AUTO: 21.9 % (ref 0.5–2)
RH FACTOR (ANTIGEN D): NORMAL
SODIUM SERPL-SCNC: 139 MMOL/L (ref 136–145)
WBC # BLD AUTO: 12.6 X10*3/UL (ref 4.4–11.3)

## 2025-07-19 PROCEDURE — 83021 HEMOGLOBIN CHROMOTOGRAPHY: CPT

## 2025-07-19 PROCEDURE — 85025 COMPLETE CBC W/AUTO DIFF WBC: CPT

## 2025-07-19 PROCEDURE — 1170000001 HC PRIVATE ONCOLOGY ROOM DAILY

## 2025-07-19 PROCEDURE — 99233 SBSQ HOSP IP/OBS HIGH 50: CPT

## 2025-07-19 PROCEDURE — 2500000004 HC RX 250 GENERAL PHARMACY W/ HCPCS (ALT 636 FOR OP/ED)

## 2025-07-19 PROCEDURE — 84484 ASSAY OF TROPONIN QUANT: CPT

## 2025-07-19 PROCEDURE — 82248 BILIRUBIN DIRECT: CPT

## 2025-07-19 PROCEDURE — 85045 AUTOMATED RETICULOCYTE COUNT: CPT

## 2025-07-19 PROCEDURE — 2500000004 HC RX 250 GENERAL PHARMACY W/ HCPCS (ALT 636 FOR OP/ED): Mod: JW,TB

## 2025-07-19 PROCEDURE — 2500000004 HC RX 250 GENERAL PHARMACY W/ HCPCS (ALT 636 FOR OP/ED): Mod: TB

## 2025-07-19 PROCEDURE — 80053 COMPREHEN METABOLIC PANEL: CPT

## 2025-07-19 PROCEDURE — 86900 BLOOD TYPING SEROLOGIC ABO: CPT

## 2025-07-19 PROCEDURE — 83615 LACTATE (LD) (LDH) ENZYME: CPT

## 2025-07-19 PROCEDURE — 2500000001 HC RX 250 WO HCPCS SELF ADMINISTERED DRUGS (ALT 637 FOR MEDICARE OP)

## 2025-07-19 PROCEDURE — 2500000005 HC RX 250 GENERAL PHARMACY W/O HCPCS: Performed by: EMERGENCY MEDICINE

## 2025-07-19 RX ADMIN — Medication 2 L/MIN: at 20:47

## 2025-07-19 RX ADMIN — HYDROMORPHONE HYDROCHLORIDE 5 MG: 2 INJECTION, SOLUTION INTRAMUSCULAR; INTRAVENOUS; SUBCUTANEOUS at 22:58

## 2025-07-19 RX ADMIN — HYDROMORPHONE HYDROCHLORIDE 5 MG: 2 INJECTION, SOLUTION INTRAMUSCULAR; INTRAVENOUS; SUBCUTANEOUS at 02:43

## 2025-07-19 RX ADMIN — FOLIC ACID 1 MG: 1 TABLET ORAL at 08:32

## 2025-07-19 RX ADMIN — HYDROMORPHONE HYDROCHLORIDE 5 MG: 2 INJECTION, SOLUTION INTRAMUSCULAR; INTRAVENOUS; SUBCUTANEOUS at 00:32

## 2025-07-19 RX ADMIN — SALINE NASAL SPRAY 1 SPRAY: 1.5 SOLUTION NASAL at 20:51

## 2025-07-19 RX ADMIN — HYDROMORPHONE HYDROCHLORIDE 5 MG: 2 INJECTION, SOLUTION INTRAMUSCULAR; INTRAVENOUS; SUBCUTANEOUS at 12:10

## 2025-07-19 RX ADMIN — HYDROMORPHONE HYDROCHLORIDE 5 MG: 2 INJECTION, SOLUTION INTRAMUSCULAR; INTRAVENOUS; SUBCUTANEOUS at 20:47

## 2025-07-19 RX ADMIN — LEVOFLOXACIN 750 MG: 5 INJECTION, SOLUTION INTRAVENOUS at 16:23

## 2025-07-19 RX ADMIN — HYDROMORPHONE HYDROCHLORIDE 5 MG: 2 INJECTION, SOLUTION INTRAMUSCULAR; INTRAVENOUS; SUBCUTANEOUS at 15:01

## 2025-07-19 RX ADMIN — PANTOPRAZOLE SODIUM 40 MG: 40 TABLET, DELAYED RELEASE ORAL at 05:30

## 2025-07-19 RX ADMIN — HYDROMORPHONE HYDROCHLORIDE 5 MG: 2 INJECTION, SOLUTION INTRAMUSCULAR; INTRAVENOUS; SUBCUTANEOUS at 08:32

## 2025-07-19 RX ADMIN — PROCHLORPERAZINE EDISYLATE 10 MG: 5 INJECTION INTRAMUSCULAR; INTRAVENOUS at 00:30

## 2025-07-19 RX ADMIN — OXYCODONE HYDROCHLORIDE 30 MG: 20 TABLET, FILM COATED, EXTENDED RELEASE ORAL at 08:32

## 2025-07-19 RX ADMIN — HYDROMORPHONE HYDROCHLORIDE 5 MG: 2 INJECTION, SOLUTION INTRAMUSCULAR; INTRAVENOUS; SUBCUTANEOUS at 17:51

## 2025-07-19 RX ADMIN — HYDROMORPHONE HYDROCHLORIDE 5 MG: 2 INJECTION, SOLUTION INTRAMUSCULAR; INTRAVENOUS; SUBCUTANEOUS at 05:30

## 2025-07-19 RX ADMIN — OXYCODONE HYDROCHLORIDE 30 MG: 20 TABLET, FILM COATED, EXTENDED RELEASE ORAL at 20:47

## 2025-07-19 ASSESSMENT — COGNITIVE AND FUNCTIONAL STATUS - GENERAL
MOBILITY SCORE: 23
DAILY ACTIVITIY SCORE: 24
CLIMB 3 TO 5 STEPS WITH RAILING: A LITTLE
MOBILITY SCORE: 24
DAILY ACTIVITIY SCORE: 24

## 2025-07-19 ASSESSMENT — PAIN SCALES - GENERAL
PAINLEVEL_OUTOF10: 8
PAINLEVEL_OUTOF10: 8
PAINLEVEL_OUTOF10: 10 - WORST POSSIBLE PAIN
PAINLEVEL_OUTOF10: 6
PAINLEVEL_OUTOF10: 6
PAINLEVEL_OUTOF10: 9
PAINLEVEL_OUTOF10: 7
PAINLEVEL_OUTOF10: 8
PAINLEVEL_OUTOF10: 7
PAINLEVEL_OUTOF10: 7
PAINLEVEL_OUTOF10: 8
PAINLEVEL_OUTOF10: 7
PAINLEVEL_OUTOF10: 9
PAINLEVEL_OUTOF10: 7
PAINLEVEL_OUTOF10: 10 - WORST POSSIBLE PAIN
PAINLEVEL_OUTOF10: 7
PAINLEVEL_OUTOF10: 10 - WORST POSSIBLE PAIN

## 2025-07-19 NOTE — PROGRESS NOTES
"Joellen Narayan is a 24 y.o. male on day 5 of admission presenting with Sickle cell pain crisis (Multi).    Subjective   Patient seen resting in bed. Reports continued 6/10 pain this morning in his lower back and chest, pain improved since admission. We discussed fever overnight, patient denies any fevers / chills during event. Reports some congestion, but denies any cough. Also denies any shortness of breath, abdominal pain, N/V/D, lightheadedness / headaches.        Objective     Physical Exam  Constitutional:       Appearance: Normal appearance.   HENT:      Head: Normocephalic.      Mouth/Throat:      Mouth: Mucous membranes are moist.     Eyes:      General: Scleral icterus present.      Pupils: Pupils are equal, round, and reactive to light.       Cardiovascular:      Rate and Rhythm: Normal rate and regular rhythm.      Pulses: Normal pulses.      Heart sounds: Normal heart sounds.   Pulmonary:      Effort: Pulmonary effort is normal.      Breath sounds: Normal breath sounds.   Abdominal:      General: Abdomen is flat. There is no distension.      Palpations: Abdomen is soft.      Tenderness: There is no abdominal tenderness. There is no guarding.     Musculoskeletal:         General: Normal range of motion.      Cervical back: Normal range of motion and neck supple.     Skin:     General: Skin is warm.     Neurological:      General: No focal deficit present.      Mental Status: He is alert.     Psychiatric:         Mood and Affect: Mood normal.         Last Recorded Vitals  Blood pressure 123/75, pulse 81, temperature 37.5 °C (99.5 °F), temperature source Temporal, resp. rate 16, height 1.88 m (6' 2.02\"), weight 70.3 kg (155 lb), SpO2 96%.  Intake/Output last 3 Shifts:  I/O last 3 completed shifts:  In: 3564.6 (50.7 mL/kg) [P.O.:1200; I.V.:1896.3 (27 mL/kg); Blood:318.3; IV Piggyback:150]  Out: 2525 (35.9 mL/kg) [Urine:2525 (1.5 mL/kg/hr)]  Weight: 70.3 kg     Relevant Results                 Scheduled " medications  Scheduled Medications[1]  Continuous medications  Continuous Medications[2]  PRN medications  PRN Medications[3]    Results for orders placed or performed during the hospital encounter of 07/14/25 (from the past 24 hours)   CBC and Auto Differential   Result Value Ref Range    WBC 12.6 (H) 4.4 - 11.3 x10*3/uL    nRBC 7.9 (H) 0.0 - 0.0 /100 WBCs    RBC 2.62 (L) 4.50 - 5.90 x10*6/uL    Hemoglobin 8.1 (L) 13.5 - 17.5 g/dL    Hematocrit 22.5 (L) 41.0 - 52.0 %    MCV 86 80 - 100 fL    MCH 30.9 26.0 - 34.0 pg    MCHC 36.0 32.0 - 36.0 g/dL    RDW 21.0 (H) 11.5 - 14.5 %    Platelets 298 150 - 450 x10*3/uL    Neutrophils % 74.8 40.0 - 80.0 %    Immature Granulocytes %, Automated 1.0 (H) 0.0 - 0.9 %    Lymphocytes % 10.6 13.0 - 44.0 %    Monocytes % 9.9 2.0 - 10.0 %    Eosinophils % 3.5 0.0 - 6.0 %    Basophils % 0.2 0.0 - 2.0 %    Neutrophils Absolute 9.40 (H) 1.20 - 7.70 x10*3/uL    Immature Granulocytes Absolute, Automated 0.12 0.00 - 0.70 x10*3/uL    Lymphocytes Absolute 1.33 1.20 - 4.80 x10*3/uL    Monocytes Absolute 1.24 (H) 0.10 - 1.00 x10*3/uL    Eosinophils Absolute 0.44 0.00 - 0.70 x10*3/uL    Basophils Absolute 0.03 0.00 - 0.10 x10*3/uL   Lactate Dehydrogenase   Result Value Ref Range     (H) 84 - 246 U/L   Reticulocytes   Result Value Ref Range    Retic % 21.9 (H) 0.5 - 2.0 %    Retic Absolute 0.573 (H) 0.022 - 0.118 x10*6/uL    Reticulocyte Hemoglobin 33 28 - 38 pg    Immature Retic fraction 20.5 (H) <=16.0 %   Comprehensive metabolic panel   Result Value Ref Range    Glucose 87 74 - 99 mg/dL    Sodium 139 136 - 145 mmol/L    Potassium 3.8 3.5 - 5.3 mmol/L    Chloride 100 98 - 107 mmol/L    Bicarbonate 31 21 - 32 mmol/L    Anion Gap 12 10 - 20 mmol/L    Urea Nitrogen 5 (L) 6 - 23 mg/dL    Creatinine 0.62 0.50 - 1.30 mg/dL    eGFR >90 >60 mL/min/1.73m*2    Calcium 9.2 8.6 - 10.6 mg/dL    Albumin 4.0 3.4 - 5.0 g/dL    Alkaline Phosphatase 138 (H) 33 - 120 U/L    Total Protein 6.0 (L) 6.4 - 8.2  g/dL    AST 62 (H) 9 - 39 U/L    Bilirubin, Total 39.7 (H) 0.0 - 1.2 mg/dL    ALT 41 10 - 52 U/L   Bilirubin, direct   Result Value Ref Range    Bilirubin, Direct     Type and screen   Result Value Ref Range    ABO TYPE B     Rh TYPE POS     ANTIBODY SCREEN NEG      *Note: Due to a large number of results and/or encounters for the requested time period, some results have not been displayed. A complete set of results can be found in Results Review.                  Assessment & Plan  Sickle cell pain crisis (Multi)    Joellen Narayan is a 24 y.o. 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 Cxr w/o evidence of acute process 7/14 and 7/15. EKG WNL. Hemolysis labs significantly elevated 7/15 and pain worsening reflecting vaso-occlusive crisis. 7/15 CT Spine w/o acute process. Started on IV dilaudid for pain control. S/p 1 unit pRBCs on 7/15 with poor incrementation in hgb, bili increased to 27 (7/16). Dr. Cruz recommends RBC exchange, however patient refusing at this time. CT PE negative for acute PE, does show increase in left basilar opacities concerning for pneumonia, started on IV Levaquin (7/16-). Bilirubin continuing to increase to 37.3 on 7/17 --> 40.9 on 7/18 -->39.7 on 7/19. On 7/18, Dr. Cruz continues to recommend RBCex, however, patient continues to refuse RBC exchange. GI consulted 7/17, recs consulting general surgery for possible cholecystectomy. General surgery consulted 7/17, discussed cholecystectomy with patient, patient declining at this time, no acute  interventions. Hgb 6.9 (7/18), 1 unit pRBC ordered per Dr. Cruz with appropriate incrementation of hemoglobin on 7/19. DC home resumed nox O2 pending improvement in pain and acute sickle cell crisis.     Updates 7/19:  - Patient febrile overnight 7/18 (tmax 38.2), patient asymptomatic at the time   - Repeat CXR (7/18) small patchy retrocardiac opacity favored to represent atelectasis v. Underlying infection   - Tbili 39.7 today   - s/p 1 unit PRBC on 7/18 with appropriate incrementation of hemoglobin to 8.1 today   - Lysis labs of LDH and retics slowly starting to downtrend today; uptrend of WBC   - Repeat hemoglobin identification pending 7/19 per Dr. Cruz   - Continue with IV levaquin (7/16--)     # Hgb SS disease acute on chronic pain   # C/f acute chest  # pneumonia  - OARRS reviewed on admission, no aberrancies noted (last filled 5/22 oxy ER qty 28 tabs, qty 14 days, and 5/16 oxy IR qty 28 tabs 7 days)  - pt reports he has no home oxycodone at home and has been calling refill line and pharmacy without success or hearing back   - No carepath available   - Primary hematologist: Dr. Cruz - updated 7/16, 7/17, 7/18, recommends RBC exchange transfusion, however patient refusing on 7/16, 7/17, and 7/18, does not want apheresis line placed   - CXR (7/14 and 7/15) w/o evidence of acute process; Repeat CXR (7/18) small retrocardiac opacity favored to represent atelectasis v. Underlying infection   - CT PE (7/16) negative for acute PE, does show increasing left basilar opacities concerning for pneumonia  - CT Spine (7/15) w/o acute process   - EKG in ED showed sinus rhythm, no ST elevation or ischemic changes, trop pending   - Hg 7.8 (7/15), (baseline ~7-8); s/p 1 unit pRBC 7/15->8.2 (7/16)-> 7.1 (7/17)->6.9 (7/18); s/p 1 unit pRBCs 7/18 with appropriate incrementation of hemoglobin to 8.1 (7/19)   - Tbili 10.4 (7/14) BL ~ 8-10;->27 (7/16)->37.3 (7/17)->40.9 (7/18) --> 39 (7/19); direct bili 1.2 (7/14)->8.4 (7/16)->16.8  (7/17)->22.5 (7/18) D bili 20.1 (7/19)  -  (7/14)->846 (7/16)->709 (7/17)->626 (7/18) -> 606 (7/19)   - WBC 15.5 (7/16)-> 10.7 (7/17)->9.8 (7/18) --> 12.6 (7/19)   - Hg S 75% (7/14); repeat draw pending (7/19) per Dr. Cruz   - utox + MJ (7/14)   - Continue with IV levaquin (7/16-current)   - s/p 4mg IVP dilaudid q2 PRN severe pain (7/15- 7/16)  - continue IVP dilaudid 5mg q2 hours PRN severe pain (7/15-)   - s/p toradol 30mg q6 sched x3 days with PPI support   - re-started home oxycontin ER 30 mg BID on admit   - Continue lidocaine patches  - c/w home folic acid 1mg daily   - s/p D51/2  mL/hr x 1 day (7/16); s/p D51/2NS 75 mL/hr x 1 day (7/17)  - supportive care: lidocaine patches, hot packs   - bowel regimen for opioid induced constipation, zofran for opioid induced nausea, and benadryl for opioid induced pruritis      # Hx choledocholithiasis 7/2024  # hyperbilirubinemia   - Worsening hemolysis in setting of vaso-occlusive crisis could be contributing to increased hyperbilirubinemia    - no abdominal pain, afebrile, leukocytosis increased from baseline and nausea/vomiting x2 days, now improving (7/17)  - July 2024 s/p ERCP with biliary sphincterotomy where sludge and stones were removed, achieving complete clearance    - 1/31/25 was planned for cholecystectomy with Dr. Dove but no show on day of surgery and again 2/13 and 2/27    - Tbili 10.4 (7/14) BL ~ 8-10;->27 (7/16)->37.3 (7/17)->40.9 (7/18) --> 39 (7/19); direct bili 1.2 (7/14)->8.4 (7/16)->16.8 (7/17)->22.5 (7/18) D bili 20.1 (7/19)  - S/p D5 1/2NS @75ml/h x24h (7/15), increased to 100 mL/hr (7/16), 75 mL/hr x 1 day (7/17)  - RUQ ultrasound (7/17) shows hepatomegaly, small volume biliary sludge and gallstones without evidence of cholecystitis or biliary dilation.  - GI consulted (7/17), rec consulting general surgery; hyperbilirubinemia likely 2/2 hemolysis in setting of acute sickle cell crisis  - ACS consulted (7/17) for possible  cholecystectomy, discussed gallbladder removal with patient, patient declining at this time, no acute interventions recommended.     # Hx recent Septic Arthritis R elbow   - Ortho surgeon: Dr. Tello   - 5/9 MRI w/anesthesia R radius/humerus showing concern for septic arthritis, osteomyelitis, myositis, severe muscle and subcutaneous soft tissue edema, and possible cellulitis   - 5/10 OR s/p R elbow I&D with ortho    - s/p IV daptomycin 6mg/kg q24h and ertapenem 1g q24h until 6/20/25 per ID  - 6/9 Most recent Ortho visit, sutures removed, cont to work on ROM, pt declining PT, fuv in 6-8 weeks for xrays of R elbow, upcoming 8/18      # Hx of nocturnal oxygen dependence   - On 2-3L at night, however patient reports daytime hypoxia as well in the past, currently on 2L      # Hx Priapism   - No active issues on admit    - Hx previously drained by urology    - Continue home Sudafed 30mg daily PRN priapism     # Nodular lymphocyte predominant Hodgkins lymphoma (NLPHL)     - Follows with Dr. Stoll   - s/p Rituxan and prednisone q3 weeks (C1 1/18/24, C2 2/8/24, Missed C3 d/t sickle cell pain crisis, C4 4/4/24, C5 5/16/24, C6 6/7/24)    - 3/13 No show to onc appt    - 4/9 PET showed interval development of new FDG focus in mid abdomen c/w recurrence   - 6/26 OP with Dr. Stoll, discussed with patient and his mother re; treatment options (including Rituxan maintenance) vs observation, pt deciding options   - due for repeat CT/PET 7/15, will need rescheduled outpatient closer to discharge      # Prophy   - s/p x6 weeks ASA 81mg BID postop, stopped 6/21  - lovenox   - SCDs, encouraged ambulation       # dispo   - Full code  - DC home resumed noc O2 pending improvement in pain and hemolysis labs   - FUV 7/28 sickle cell, 8/18 Pulm, 8/18 Ortho, 11/6 Dr. Stoll      I spent 60 minutes in the professional and overall care of this patient.    Assessment and plan as above discussed with attending physician, Dr. Raudel Hebert  AGNIESZKA Stoll PA-C         [1] acetaminophen, 650 mg, oral, Once  diphenhydrAMINE, 25 mg, oral, Once  enoxaparin, 40 mg, subcutaneous, Daily  folic acid, 1 mg, oral, Daily  levoFLOXacin, 750 mg, intravenous, q24h  lidocaine, 5 mL, infiltration, Once  lidocaine, 2 patch, transdermal, Daily  oxyCODONE ER, 30 mg, oral, q12h REYNALDO  oxygen, , inhalation, Continuous - Inhalation  pantoprazole, 40 mg, oral, Daily before breakfast  polyethylene glycol, 17 g, oral, Daily  [2]    [3] PRN medications: diphenhydrAMINE, HYDROmorphone, ondansetron, prochlorperazine, sodium chloride

## 2025-07-19 NOTE — CARE PLAN
The patient's goals for the shift include      The clinical goals for the shift include pt will remain HDS and VSS throughout shift 7/18/25 by 1900.      Problem: Pain - Adult  Goal: Verbalizes/displays adequate comfort level or baseline comfort level  Outcome: Progressing     Problem: Safety - Adult  Goal: Free from fall injury  Outcome: Progressing     Problem: Discharge Planning  Goal: Discharge to home or other facility with appropriate resources  Outcome: Progressing     Problem: Chronic Conditions and Co-morbidities  Goal: Patient's chronic conditions and co-morbidity symptoms are monitored and maintained or improved  Outcome: Progressing     Problem: Nutrition  Goal: Nutrient intake appropriate for maintaining nutritional needs  Outcome: Progressing     Problem: Pain  Goal: Takes deep breaths with improved pain control throughout the shift  Outcome: Progressing  Goal: Turns in bed with improved pain control throughout the shift  Outcome: Progressing  Goal: Walks with improved pain control throughout the shift  Outcome: Progressing  Goal: Performs ADL's with improved pain control throughout shift  Outcome: Progressing  Goal: Participates in PT with improved pain control throughout the shift  Outcome: Progressing  Goal: Free from opioid side effects throughout the shift  Outcome: Progressing  Goal: Free from acute confusion related to pain meds throughout the shift  Outcome: Progressing

## 2025-07-20 VITALS
DIASTOLIC BLOOD PRESSURE: 83 MMHG | SYSTOLIC BLOOD PRESSURE: 130 MMHG | RESPIRATION RATE: 18 BRPM | HEIGHT: 74 IN | OXYGEN SATURATION: 94 % | BODY MASS INDEX: 19.89 KG/M2 | WEIGHT: 155 LBS | HEART RATE: 80 BPM | TEMPERATURE: 97 F

## 2025-07-20 LAB
ALBUMIN SERPL BCP-MCNC: 4.3 G/DL (ref 3.4–5)
ALP SERPL-CCNC: 143 U/L (ref 33–120)
ALT SERPL W P-5'-P-CCNC: 46 U/L (ref 10–52)
ANION GAP SERPL CALC-SCNC: 10 MMOL/L (ref 10–20)
AST SERPL W P-5'-P-CCNC: 56 U/L (ref 9–39)
BASOPHILS # BLD AUTO: 0.03 X10*3/UL (ref 0–0.1)
BASOPHILS NFR BLD AUTO: 0.3 %
BILIRUB DIRECT SERPL-MCNC: 12.9 MG/DL (ref 0–0.3)
BILIRUB SERPL-MCNC: 24.9 MG/DL (ref 0–1.2)
BUN SERPL-MCNC: 6 MG/DL (ref 6–23)
CALCIUM SERPL-MCNC: 9.7 MG/DL (ref 8.6–10.6)
CHLORIDE SERPL-SCNC: 96 MMOL/L (ref 98–107)
CO2 SERPL-SCNC: 32 MMOL/L (ref 21–32)
CREAT SERPL-MCNC: 0.54 MG/DL (ref 0.5–1.3)
EGFRCR SERPLBLD CKD-EPI 2021: >90 ML/MIN/1.73M*2
EOSINOPHIL # BLD AUTO: 0.61 X10*3/UL (ref 0–0.7)
EOSINOPHIL NFR BLD AUTO: 6 %
ERYTHROCYTE [DISTWIDTH] IN BLOOD BY AUTOMATED COUNT: 19.7 % (ref 11.5–14.5)
GLUCOSE SERPL-MCNC: 80 MG/DL (ref 74–99)
HCT VFR BLD AUTO: 25.3 % (ref 41–52)
HGB BLD-MCNC: 8.8 G/DL (ref 13.5–17.5)
HGB RETIC QN: 33 PG (ref 28–38)
IMM GRANULOCYTES # BLD AUTO: 0.14 X10*3/UL (ref 0–0.7)
IMM GRANULOCYTES NFR BLD AUTO: 1.4 % (ref 0–0.9)
IMMATURE RETIC FRACTION: 21.2 %
LDH SERPL L TO P-CCNC: 563 U/L (ref 84–246)
LYMPHOCYTES # BLD AUTO: 1.76 X10*3/UL (ref 1.2–4.8)
LYMPHOCYTES NFR BLD AUTO: 17.2 %
MCH RBC QN AUTO: 30 PG (ref 26–34)
MCHC RBC AUTO-ENTMCNC: 34.8 G/DL (ref 32–36)
MCV RBC AUTO: 86 FL (ref 80–100)
MONOCYTES # BLD AUTO: 1.59 X10*3/UL (ref 0.1–1)
MONOCYTES NFR BLD AUTO: 15.5 %
NEUTROPHILS # BLD AUTO: 6.11 X10*3/UL (ref 1.2–7.7)
NEUTROPHILS NFR BLD AUTO: 59.6 %
NRBC BLD-RTO: 7.4 /100 WBCS (ref 0–0)
PLATELET # BLD AUTO: 282 X10*3/UL (ref 150–450)
POTASSIUM SERPL-SCNC: 3.3 MMOL/L (ref 3.5–5.3)
PROT SERPL-MCNC: 7 G/DL (ref 6.4–8.2)
RBC # BLD AUTO: 2.93 X10*6/UL (ref 4.5–5.9)
RETICS #: 0.55 X10*6/UL (ref 0.02–0.12)
RETICS/RBC NFR AUTO: 18.9 % (ref 0.5–2)
SODIUM SERPL-SCNC: 135 MMOL/L (ref 136–145)
WBC # BLD AUTO: 10.2 X10*3/UL (ref 4.4–11.3)

## 2025-07-20 PROCEDURE — 80053 COMPREHEN METABOLIC PANEL: CPT

## 2025-07-20 PROCEDURE — 2500000004 HC RX 250 GENERAL PHARMACY W/ HCPCS (ALT 636 FOR OP/ED): Mod: TB

## 2025-07-20 PROCEDURE — 2500000005 HC RX 250 GENERAL PHARMACY W/O HCPCS: Performed by: EMERGENCY MEDICINE

## 2025-07-20 PROCEDURE — 2500000004 HC RX 250 GENERAL PHARMACY W/ HCPCS (ALT 636 FOR OP/ED)

## 2025-07-20 PROCEDURE — 85045 AUTOMATED RETICULOCYTE COUNT: CPT

## 2025-07-20 PROCEDURE — 2500000001 HC RX 250 WO HCPCS SELF ADMINISTERED DRUGS (ALT 637 FOR MEDICARE OP): Performed by: PHYSICIAN ASSISTANT

## 2025-07-20 PROCEDURE — 99233 SBSQ HOSP IP/OBS HIGH 50: CPT

## 2025-07-20 PROCEDURE — 2500000001 HC RX 250 WO HCPCS SELF ADMINISTERED DRUGS (ALT 637 FOR MEDICARE OP)

## 2025-07-20 PROCEDURE — 82248 BILIRUBIN DIRECT: CPT

## 2025-07-20 PROCEDURE — 85025 COMPLETE CBC W/AUTO DIFF WBC: CPT

## 2025-07-20 PROCEDURE — 1170000001 HC PRIVATE ONCOLOGY ROOM DAILY

## 2025-07-20 PROCEDURE — 83615 LACTATE (LD) (LDH) ENZYME: CPT

## 2025-07-20 RX ORDER — ACETAMINOPHEN 325 MG/1
650 TABLET ORAL ONCE
Status: COMPLETED | OUTPATIENT
Start: 2025-07-20 | End: 2025-07-20

## 2025-07-20 RX ORDER — POTASSIUM CHLORIDE 14.9 MG/ML
20 INJECTION INTRAVENOUS
Status: COMPLETED | OUTPATIENT
Start: 2025-07-20 | End: 2025-07-20

## 2025-07-20 RX ADMIN — OXYCODONE HYDROCHLORIDE 30 MG: 20 TABLET, FILM COATED, EXTENDED RELEASE ORAL at 08:01

## 2025-07-20 RX ADMIN — HYDROMORPHONE HYDROCHLORIDE 5 MG: 2 INJECTION, SOLUTION INTRAMUSCULAR; INTRAVENOUS; SUBCUTANEOUS at 01:05

## 2025-07-20 RX ADMIN — PANTOPRAZOLE SODIUM 40 MG: 40 TABLET, DELAYED RELEASE ORAL at 05:36

## 2025-07-20 RX ADMIN — HYDROMORPHONE HYDROCHLORIDE 5 MG: 2 INJECTION, SOLUTION INTRAMUSCULAR; INTRAVENOUS; SUBCUTANEOUS at 05:36

## 2025-07-20 RX ADMIN — HYDROMORPHONE HYDROCHLORIDE 5 MG: 2 INJECTION, SOLUTION INTRAMUSCULAR; INTRAVENOUS; SUBCUTANEOUS at 16:51

## 2025-07-20 RX ADMIN — HYDROMORPHONE HYDROCHLORIDE 5 MG: 2 INJECTION, SOLUTION INTRAMUSCULAR; INTRAVENOUS; SUBCUTANEOUS at 14:46

## 2025-07-20 RX ADMIN — HYDROMORPHONE HYDROCHLORIDE 5 MG: 2 INJECTION, SOLUTION INTRAMUSCULAR; INTRAVENOUS; SUBCUTANEOUS at 10:08

## 2025-07-20 RX ADMIN — POTASSIUM CHLORIDE 20 MEQ: 14.9 INJECTION, SOLUTION INTRAVENOUS at 12:46

## 2025-07-20 RX ADMIN — HYDROMORPHONE HYDROCHLORIDE 5 MG: 2 INJECTION, SOLUTION INTRAMUSCULAR; INTRAVENOUS; SUBCUTANEOUS at 12:40

## 2025-07-20 RX ADMIN — HYDROMORPHONE HYDROCHLORIDE 5 MG: 2 INJECTION, SOLUTION INTRAMUSCULAR; INTRAVENOUS; SUBCUTANEOUS at 23:34

## 2025-07-20 RX ADMIN — FOLIC ACID 1 MG: 1 TABLET ORAL at 08:01

## 2025-07-20 RX ADMIN — POTASSIUM CHLORIDE 20 MEQ: 14.9 INJECTION, SOLUTION INTRAVENOUS at 10:13

## 2025-07-20 RX ADMIN — HYDROMORPHONE HYDROCHLORIDE 5 MG: 2 INJECTION, SOLUTION INTRAMUSCULAR; INTRAVENOUS; SUBCUTANEOUS at 18:53

## 2025-07-20 RX ADMIN — OXYCODONE HYDROCHLORIDE 30 MG: 20 TABLET, FILM COATED, EXTENDED RELEASE ORAL at 21:14

## 2025-07-20 RX ADMIN — ACETAMINOPHEN 650 MG: 325 TABLET ORAL at 18:53

## 2025-07-20 RX ADMIN — Medication 2 L/MIN: at 21:18

## 2025-07-20 RX ADMIN — HYDROMORPHONE HYDROCHLORIDE 5 MG: 2 INJECTION, SOLUTION INTRAMUSCULAR; INTRAVENOUS; SUBCUTANEOUS at 07:56

## 2025-07-20 RX ADMIN — HYDROMORPHONE HYDROCHLORIDE 5 MG: 2 INJECTION, SOLUTION INTRAMUSCULAR; INTRAVENOUS; SUBCUTANEOUS at 21:14

## 2025-07-20 RX ADMIN — ONDANSETRON HYDROCHLORIDE 8 MG: 8 TABLET, FILM COATED ORAL at 18:56

## 2025-07-20 RX ADMIN — Medication 2 L/MIN: at 08:05

## 2025-07-20 RX ADMIN — HYDROMORPHONE HYDROCHLORIDE 5 MG: 2 INJECTION, SOLUTION INTRAMUSCULAR; INTRAVENOUS; SUBCUTANEOUS at 03:19

## 2025-07-20 RX ADMIN — SALINE NASAL SPRAY 1 SPRAY: 1.5 SOLUTION NASAL at 21:14

## 2025-07-20 RX ADMIN — LEVOFLOXACIN 750 MG: 5 INJECTION, SOLUTION INTRAVENOUS at 14:47

## 2025-07-20 ASSESSMENT — COGNITIVE AND FUNCTIONAL STATUS - GENERAL
CLIMB 3 TO 5 STEPS WITH RAILING: A LITTLE
MOBILITY SCORE: 23
MOBILITY SCORE: 23
DAILY ACTIVITIY SCORE: 24
CLIMB 3 TO 5 STEPS WITH RAILING: A LITTLE

## 2025-07-20 ASSESSMENT — PAIN SCALES - GENERAL
PAINLEVEL_OUTOF10: 9
PAINLEVEL_OUTOF10: 8
PAINLEVEL_OUTOF10: 8
PAINLEVEL_OUTOF10: 9
PAINLEVEL_OUTOF10: 8
PAINLEVEL_OUTOF10: 8
PAINLEVEL_OUTOF10: 9
PAINLEVEL_OUTOF10: 10 - WORST POSSIBLE PAIN
PAINLEVEL_OUTOF10: 9
PAINLEVEL_OUTOF10: 8
PAINLEVEL_OUTOF10: 9
PAINLEVEL_OUTOF10: 7
PAINLEVEL_OUTOF10: 8
PAINLEVEL_OUTOF10: 9

## 2025-07-20 NOTE — PROGRESS NOTES
[facility]  Internal Medicine Progress Note    Service: [unfilled]  Attending: Alex Parnell MD  Patient Name/MRN: Joellen Narayan, [id number] Admit Date: 2025  Today's Date: 2025  Length of Stay:  LOS: 6 days                               Room #: 4027/4027-REMI Narayan is a 24 y.o. male on day 6 of admission presenting with Sickle cell pain crisis (Multi).     Subjective:  NAEON. This morning patient is sitting comfortably in a chair.  Patient endorsing right-sided chest pain worse with inspiration and improves with leaning forward.  Chest pain.  Symptoms started.  Denies any fever chills nausea or vomiting.  BM this a.m., no melena hematochezia.  Endorses great appetite.       Objective:  Temp  Av.9 °C (98.4 °F)  Min: 36.2 °C (97.2 °F)  Max: 37.6 °C (99.7 °F)  Pulse  Av.5  Min: 71  Max: 96  BP  Min: 113/60  Max: 128/76  Resp  Av.3  Min: 16  Max: 18  SpO2  Av.8 %  Min: 93 %  Max: 98 %   could not be evaluated. This SmartLink does not work with rows of the type:   Systolic (24hrs), Av , Min:113 , Max:128     Diastolic (24hrs), Av, Min:60, Max:76      Intake/Output Summary (Last 24 hours) at 2025 0909  Last data filed at 2025 0803  Gross per 24 hour   Intake 1150 ml   Output 350 ml   Net 800 ml       Physical Exam  General: On room air. No acute distress  HEENT: Atraumatic. Normocephalic. EOMI, PERRLA.  Conjunctival icterus  Neck: Supple,. No JVD. No lymphadenopathy. No carotid bruits.  Lungs: CTAB. Normal symmetry and expansion.  Cardio: RRR. S1/S2. No rubs/murmurs/gallops   Abd: Normal bowel sounds. Soft Non-tender/distended. No mass   Ext: No edema, clubbing or cyanosis,. Palpable pulses.  Msk: ROM nl, No joint swelling, deformity, or tenderness  Psych: Normal affect and mentation   Neuro: ANOx3, motor exam non-focal, sensation intact  Skin: warm, dry, clear and intact. +jaundice, no erythema, or dehiscence.     Lines:   IV Meds    "  Scheduled meds  acetaminophen, 650 mg, Once  diphenhydrAMINE, 25 mg, Once  enoxaparin, 40 mg, Daily  folic acid, 1 mg, Daily  levoFLOXacin, 750 mg, q24h  lidocaine, 5 mL, Once  lidocaine, 2 patch, Daily  oxyCODONE ER, 30 mg, q12h REYNALDO  oxygen, , Continuous - Inhalation  pantoprazole, 40 mg, Daily before breakfast  polyethylene glycol, 17 g, Daily      PRN Meds  diphenhydrAMINE, 25 mg, q6h PRN  HYDROmorphone, 5 mg, q2h PRN  ondansetron, 8 mg, q6h PRN  prochlorperazine, 10 mg, q6h PRN  sodium chloride, 1 spray, 4x daily PRN             LABS:  CBC:  Results from last 7 days   Lab Units 07/20/25 0537 07/19/25 0443 07/18/25  0515   WBC AUTO x10*3/uL 10.2 12.6* 9.8   HEMOGLOBIN g/dL 8.8* 8.1* 6.9*   HEMATOCRIT % 25.3* 22.5* 19.4*   PLATELETS AUTO x10*3/uL 282 298 250        LFTs:  Results from last 7 days   Lab Units 07/20/25 0537 07/19/25  0443 07/18/25  0515 07/17/25  0454   ALT U/L 46 41 32 20   AST U/L 56* 62* 61* 46*   GGT U/L  --   --   --  42   ALK PHOS U/L 143* 138* 119 109   BILIRUBIN DIRECT mg/dL 12.9* 20.1* 22.5* 16.8*     COAGs:  Results from last 7 days   Lab Units 07/15/25  1105   INR  1.4*           ELECTROLYTES:  Results from last 7 days   Lab Units 07/20/25 0537 07/19/25 0443 07/18/25  0515   SODIUM mmol/L 135* 139 139   POTASSIUM mmol/L 3.3* 3.8 3.7   CO2 mmol/L 32 31 30   ANION GAP mmol/L 10 12 12   BUN mg/dL 6 5* 8   CREATININE mg/dL 0.54 0.62 0.79   GLUCOSE mg/dL 80 87 84   EGFR mL/min/1.73m*2 >90 >90 >90        MICRO:  Susceptibility data from last 90 days.  Collected Specimen Info Organism   05/04/25 Fluid from SPUTUM Normal throat raulito              No lab exists for component: \"AGALPCRNB\"        Imaging:  === 07/14/25 ===    US RIGHT UPPER QUADRANT    - Impression -  1. Hepatomegaly with diffuse echogenicity compatible with diffuse  fatty infiltration.  2. Small volume biliary sludge and gallstones without evidence of  cholecystitis or biliary dilation.    I have reviewed the images/study " and I agree with the findings as  stated by Khoa Reid MD (PGY-3).    MACRO:  None    Signed by: Jamshid Enrique 7/17/2025 11:01 AM  Dictation workstation:   [id number]  === 07/14/25 ===    CT ANGIO CHEST FOR PULMONARY EMBOLISM    - Impression -  1. There is no evidence of pulmonary embolism.  2. There is slight interval increase in left basilar opacities which  may be atelectatic and related to parenchymal changes of sickle  crisis. Correlate with any concern for developing left basilar  infiltrate/pneumonia.    MACRO:  None    Signed by: Rogelio Tsang 7/16/2025 11:35 AM  Dictation workstation:   [id number]     Assessment and Plan:   Joellen Narayan is a 24 y.o. 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 CTPE negative for acute PE, however evidence of left basilar opacities concerning for pneumonia (on Levaquin). Given worsening hyperbilirubinemia, exchange transfusion recommended however patient refused treatment at this time. ACS c/s for possible cholecystectomy, no acute indication at this time and patient also declining at this time. Currently being managed adequately from a pain crisis standpoint, will continue to monitor for hyperbilirubinemia improvement and Hgb appears stable at this time.      Updates 7/20:  - Repeat hemoglobin identification pending 7/19 per Dr. Cruz   - Continue with IV levaquin (7/16--)  - Tbili 24.9 <-39.7 (7/19)  - Hgb 8.8    # Hgb SS Disease acute on chronic pain   # C/f Acute Chest Syndrome   # CAP  - OARRS reviewed on  admission, no aberrancies noted (last filled 5/22 oxy ER qty 28 tabs, qty 14 days, and 5/16 oxy IR qty 28 tabs 7 days)  - pt reports he has no home oxycodone at home and has been calling refill line and pharmacy without success or hearing back   - No carepath available   - Primary hematologist: Dr. Cruz - Refusing exchange transfusion since admission.  - CTPE negative for acute PE, however evidence of left basilar opacities concerning for pneumonia   Plan:  - Repeat hemoglobin identification pending   - Continue with IV levaquin (7/16-current)   - s/p 4mg IVP dilaudid q2 PRN severe pain (7/15- 7/16)  - continue IVP dilaudid 5mg q2 hours PRN severe pain (7/15-)   - s/p toradol 30mg q6 sched x3 days with PPI support   - re-started home oxycontin ER 30 mg BID on admit   - Continue lidocaine patches  - c/w home folic acid 1mg daily   - s/p D51/2  mL/hr x 1 day (7/16); s/p D51/2NS 75 mL/hr x 1 day (7/17)  - supportive care: lidocaine patches, hot packs   - bowel regimen for opioid induced constipation, zofran for opioid induced nausea, and benadryl for opioid induced pruritis     # Hx Choledocholithiasis 7/2024  # Hyperbilirubinemia   - Worsening hemolysis in setting of vaso-occlusive crisis could be contributing to increased hyperbilirubinemia    - no abdominal pain, afebrile, leukocytosis increased from baseline and nausea/vomiting x2 days, now improving (7/17)  - July 2024 s/p ERCP with biliary sphincterotomy where sludge and stones were removed, achieving complete clearance    - 1/31/25 was planned for cholecystectomy with Dr. Dove but no show on day of surgery and again 2/13 and 2/27    - Tbili 10.4 (7/14) BL ~ 8-10;->27 (7/16)->37.3 (7/17)->40.9 (7/18) --> 39 (7/19); direct bili 1.2 (7/14)->8.4 (7/16)->16.8 (7/17)->22.5 (7/18) D bili 20.1 -> 12.9 (7/20)  - S/p D5 1/2NS @75ml/h x24h (7/15), increased to 100 mL/hr (7/16), 75 mL/hr x 1 day (7/17)  - RUQ ultrasound (7/17) shows hepatomegaly, small volume biliary  sludge and gallstones without evidence of cholecystitis or biliary dilation.  - GI consulted (7/17), rec consulting general surgery; hyperbilirubinemia likely 2/2 hemolysis in setting of acute sickle cell crisis  - ACS consulted (7/17) for possible cholecystectomy, discussed gallbladder removal with patient, patient declining at this time, no acute interventions recommended.     # Hx recent Septic Arthritis R elbow   - Ortho surgeon: Dr. Tello   - 5/9 MRI w/anesthesia R radius/humerus showing concern for septic arthritis, osteomyelitis, myositis, severe muscle and subcutaneous soft tissue edema, and possible cellulitis   - 5/10 OR s/p R elbow I&D with ortho    - s/p IV daptomycin 6mg/kg q24h and ertapenem 1g q24h until 6/20/25 per ID  - 6/9 Most recent Ortho visit, sutures removed, cont to work on ROM, pt declining PT, fuv in 6-8 weeks for xrays of R elbow, upcoming 8/18      # Hx of nocturnal oxygen dependence   - On 2-3L at night, however patient reports daytime hypoxia as well in the past, currently on 2L      # Hx Priapism   - No active issues on admit    - Hx previously drained by urology    - Continue home Sudafed 30mg daily PRN priapism     # Nodular lymphocyte predominant Hodgkins lymphoma (NLPHL)     - Follows with Dr. Stoll   - s/p Rituxan and prednisone q3 weeks (C1 1/18/24, C2 2/8/24, Missed C3 d/t sickle cell pain crisis, C4 4/4/24, C5 5/16/24, C6 6/7/24)    - 3/13 No show to onc appt    - 4/9 PET showed interval development of new FDG focus in mid abdomen c/w recurrence   - 6/26 OP with Dr. Stoll, discussed with patient and his mother re; treatment options (including Rituxan maintenance) vs observation, pt deciding options   - due for repeat CT/PET 7/15, will need rescheduled outpatient closer to discharge         F: PRN  E: PRN  N: Regular Diet  A: pIV    DVT ppx: Lovenox  GI ppx: Not indicated    CODE STATUS: Full Code   SURROGATE DECISION MAKER: Melissa Narayan (Parent) [phone] (Mobile)      Case seen and discussed with attending Dr. Brown, to addend as necessary.    Yocasta Leger MD. MPH  Internal Medicine PGY-3

## 2025-07-20 NOTE — CARE PLAN
The clinical goals for the shift include pt will remain safe and free from injury throughout shift 7/20/2025 0700      Problem: Pain - Adult  Goal: Verbalizes/displays adequate comfort level or baseline comfort level  Outcome: Progressing     Problem: Safety - Adult  Goal: Free from fall injury  Outcome: Progressing     Problem: Pain  Goal: Takes deep breaths with improved pain control throughout the shift  Outcome: Progressing     Problem: Pain  Goal: Turns in bed with improved pain control throughout the shift  Outcome: Progressing     Problem: Pain  Goal: Walks with improved pain control throughout the shift  Outcome: Progressing     Problem: Pain  Goal: Performs ADL's with improved pain control throughout shift  Outcome: Progressing     Problem: Pain  Goal: Free from acute confusion related to pain meds throughout the shift  Outcome: Progressing     Problem: Pain  Goal: Free from opioid side effects throughout the shift  Outcome: Progressing

## 2025-07-21 LAB
ALBUMIN SERPL BCP-MCNC: 4.3 G/DL (ref 3.4–5)
ALP SERPL-CCNC: 147 U/L (ref 33–120)
ALT SERPL W P-5'-P-CCNC: 50 U/L (ref 10–52)
ANION GAP SERPL CALC-SCNC: 12 MMOL/L (ref 10–20)
AST SERPL W P-5'-P-CCNC: 56 U/L (ref 9–39)
BASOPHILS # BLD AUTO: 0.03 X10*3/UL (ref 0–0.1)
BASOPHILS NFR BLD AUTO: 0.3 %
BILIRUB DIRECT SERPL-MCNC: 9.1 MG/DL (ref 0–0.3)
BILIRUB SERPL-MCNC: 20.7 MG/DL (ref 0–1.2)
BUN SERPL-MCNC: 8 MG/DL (ref 6–23)
CALCIUM SERPL-MCNC: 9.6 MG/DL (ref 8.6–10.6)
CHLORIDE SERPL-SCNC: 96 MMOL/L (ref 98–107)
CO2 SERPL-SCNC: 31 MMOL/L (ref 21–32)
CREAT SERPL-MCNC: 0.62 MG/DL (ref 0.5–1.3)
EGFRCR SERPLBLD CKD-EPI 2021: >90 ML/MIN/1.73M*2
EOSINOPHIL # BLD AUTO: 0.95 X10*3/UL (ref 0–0.7)
EOSINOPHIL NFR BLD AUTO: 9.6 %
ERYTHROCYTE [DISTWIDTH] IN BLOOD BY AUTOMATED COUNT: 19.3 % (ref 11.5–14.5)
GLUCOSE SERPL-MCNC: 79 MG/DL (ref 74–99)
HCT VFR BLD AUTO: 25.1 % (ref 41–52)
HEMOGLOBIN A2: 3.3 % (ref 2–3.5)
HEMOGLOBIN A: 37.6 % (ref 95.8–98)
HEMOGLOBIN F: 2.5 % (ref 0–2)
HEMOGLOBIN IDENTIFICATION INTERPRETATION: ABNORMAL
HEMOGLOBIN S: 56.6 %
HGB BLD-MCNC: 8.7 G/DL (ref 13.5–17.5)
HGB RETIC QN: 32 PG (ref 28–38)
IMM GRANULOCYTES # BLD AUTO: 0.14 X10*3/UL (ref 0–0.7)
IMM GRANULOCYTES NFR BLD AUTO: 1.4 % (ref 0–0.9)
IMMATURE RETIC FRACTION: 18.9 %
LDH SERPL L TO P-CCNC: 525 U/L (ref 84–246)
LYMPHOCYTES # BLD AUTO: 2 X10*3/UL (ref 1.2–4.8)
LYMPHOCYTES NFR BLD AUTO: 20.3 %
MCH RBC QN AUTO: 30 PG (ref 26–34)
MCHC RBC AUTO-ENTMCNC: 34.7 G/DL (ref 32–36)
MCV RBC AUTO: 87 FL (ref 80–100)
MONOCYTES # BLD AUTO: 1.56 X10*3/UL (ref 0.1–1)
MONOCYTES NFR BLD AUTO: 15.8 %
NEUTROPHILS # BLD AUTO: 5.17 X10*3/UL (ref 1.2–7.7)
NEUTROPHILS NFR BLD AUTO: 52.6 %
NRBC BLD-RTO: 4.9 /100 WBCS (ref 0–0)
PATH REVIEW-HGB IDENTIFICATION: ABNORMAL
PLATELET # BLD AUTO: 293 X10*3/UL (ref 150–450)
POTASSIUM SERPL-SCNC: 4.1 MMOL/L (ref 3.5–5.3)
PROT SERPL-MCNC: 6.7 G/DL (ref 6.4–8.2)
RBC # BLD AUTO: 2.9 X10*6/UL (ref 4.5–5.9)
RETICS #: 0.52 X10*6/UL (ref 0.02–0.12)
RETICS/RBC NFR AUTO: 17.8 % (ref 0.5–2)
SODIUM SERPL-SCNC: 135 MMOL/L (ref 136–145)
WBC # BLD AUTO: 9.9 X10*3/UL (ref 4.4–11.3)

## 2025-07-21 PROCEDURE — 85025 COMPLETE CBC W/AUTO DIFF WBC: CPT

## 2025-07-21 PROCEDURE — 2500000001 HC RX 250 WO HCPCS SELF ADMINISTERED DRUGS (ALT 637 FOR MEDICARE OP)

## 2025-07-21 PROCEDURE — 99233 SBSQ HOSP IP/OBS HIGH 50: CPT

## 2025-07-21 PROCEDURE — 83615 LACTATE (LD) (LDH) ENZYME: CPT

## 2025-07-21 PROCEDURE — 2500000005 HC RX 250 GENERAL PHARMACY W/O HCPCS: Performed by: EMERGENCY MEDICINE

## 2025-07-21 PROCEDURE — 1170000001 HC PRIVATE ONCOLOGY ROOM DAILY

## 2025-07-21 PROCEDURE — 2500000004 HC RX 250 GENERAL PHARMACY W/ HCPCS (ALT 636 FOR OP/ED): Mod: JW,TB

## 2025-07-21 PROCEDURE — 82248 BILIRUBIN DIRECT: CPT

## 2025-07-21 PROCEDURE — 85045 AUTOMATED RETICULOCYTE COUNT: CPT

## 2025-07-21 PROCEDURE — 80053 COMPREHEN METABOLIC PANEL: CPT

## 2025-07-21 PROCEDURE — 2500000005 HC RX 250 GENERAL PHARMACY W/O HCPCS

## 2025-07-21 RX ORDER — LACTULOSE 10 G/15ML
20 SOLUTION ORAL ONCE
Status: COMPLETED | OUTPATIENT
Start: 2025-07-21 | End: 2025-07-21

## 2025-07-21 RX ORDER — LEVOFLOXACIN 750 MG/1
750 TABLET, FILM COATED ORAL EVERY 24 HOURS
Status: COMPLETED | OUTPATIENT
Start: 2025-07-21 | End: 2025-07-23

## 2025-07-21 RX ORDER — ACETAMINOPHEN 325 MG/1
650 TABLET ORAL ONCE
Status: COMPLETED | OUTPATIENT
Start: 2025-07-21 | End: 2025-07-21

## 2025-07-21 RX ORDER — AMOXICILLIN 250 MG
1 CAPSULE ORAL 2 TIMES DAILY
Status: DISCONTINUED | OUTPATIENT
Start: 2025-07-21 | End: 2025-07-24 | Stop reason: HOSPADM

## 2025-07-21 RX ADMIN — LEVOFLOXACIN 750 MG: 750 TABLET, FILM COATED ORAL at 12:23

## 2025-07-21 RX ADMIN — OXYCODONE HYDROCHLORIDE 15 MG: 10 TABLET ORAL at 22:37

## 2025-07-21 RX ADMIN — SENNOSIDES AND DOCUSATE SODIUM 1 TABLET: 50; 8.6 TABLET ORAL at 12:23

## 2025-07-21 RX ADMIN — LIDOCAINE 4% 2 PATCH: 40 PATCH TOPICAL at 08:46

## 2025-07-21 RX ADMIN — FOLIC ACID 1 MG: 1 TABLET ORAL at 08:46

## 2025-07-21 RX ADMIN — HYDROMORPHONE HYDROCHLORIDE 5 MG: 2 INJECTION, SOLUTION INTRAMUSCULAR; INTRAVENOUS; SUBCUTANEOUS at 20:45

## 2025-07-21 RX ADMIN — Medication 2 L/MIN: at 20:48

## 2025-07-21 RX ADMIN — HYDROMORPHONE HYDROCHLORIDE 5 MG: 2 INJECTION, SOLUTION INTRAMUSCULAR; INTRAVENOUS; SUBCUTANEOUS at 15:05

## 2025-07-21 RX ADMIN — HYDROMORPHONE HYDROCHLORIDE 5 MG: 2 INJECTION, SOLUTION INTRAMUSCULAR; INTRAVENOUS; SUBCUTANEOUS at 08:42

## 2025-07-21 RX ADMIN — OXYCODONE HYDROCHLORIDE 30 MG: 20 TABLET, FILM COATED, EXTENDED RELEASE ORAL at 08:46

## 2025-07-21 RX ADMIN — ACETAMINOPHEN 650 MG: 325 TABLET ORAL at 22:37

## 2025-07-21 RX ADMIN — HYDROMORPHONE HYDROCHLORIDE 5 MG: 2 INJECTION, SOLUTION INTRAMUSCULAR; INTRAVENOUS; SUBCUTANEOUS at 04:10

## 2025-07-21 RX ADMIN — OXYCODONE HYDROCHLORIDE 30 MG: 20 TABLET, FILM COATED, EXTENDED RELEASE ORAL at 20:44

## 2025-07-21 RX ADMIN — LACTULOSE 20 G: 20 SOLUTION ORAL at 12:23

## 2025-07-21 RX ADMIN — OXYCODONE HYDROCHLORIDE 15 MG: 10 TABLET ORAL at 13:22

## 2025-07-21 RX ADMIN — PANTOPRAZOLE SODIUM 40 MG: 40 TABLET, DELAYED RELEASE ORAL at 06:17

## 2025-07-21 RX ADMIN — HYDROMORPHONE HYDROCHLORIDE 5 MG: 2 INJECTION, SOLUTION INTRAMUSCULAR; INTRAVENOUS; SUBCUTANEOUS at 06:17

## 2025-07-21 RX ADMIN — HYDROMORPHONE HYDROCHLORIDE 5 MG: 2 INJECTION, SOLUTION INTRAMUSCULAR; INTRAVENOUS; SUBCUTANEOUS at 01:43

## 2025-07-21 ASSESSMENT — PAIN SCALES - GENERAL
PAINLEVEL_OUTOF10: 9
PAINLEVEL_OUTOF10: 9
PAINLEVEL_OUTOF10: 8
PAINLEVEL_OUTOF10: 7
PAINLEVEL_OUTOF10: 8
PAINLEVEL_OUTOF10: 8
PAINLEVEL_OUTOF10: 7
PAINLEVEL_OUTOF10: 9
PAINLEVEL_OUTOF10: 8
PAINLEVEL_OUTOF10: 8
PAINLEVEL_OUTOF10: 9
PAINLEVEL_OUTOF10: 8
PAINLEVEL_OUTOF10: 7
PAINLEVEL_OUTOF10: 9
PAINLEVEL_OUTOF10: 7
PAINLEVEL_OUTOF10: 10 - WORST POSSIBLE PAIN
PAINLEVEL_OUTOF10: 8

## 2025-07-21 ASSESSMENT — COGNITIVE AND FUNCTIONAL STATUS - GENERAL
DAILY ACTIVITIY SCORE: 24
MOBILITY SCORE: 23
CLIMB 3 TO 5 STEPS WITH RAILING: A LITTLE
DAILY ACTIVITIY SCORE: 24
MOBILITY SCORE: 24

## 2025-07-21 ASSESSMENT — PAIN DESCRIPTION - LOCATION
LOCATION: BACK

## 2025-07-21 ASSESSMENT — PAIN - FUNCTIONAL ASSESSMENT
PAIN_FUNCTIONAL_ASSESSMENT: 0-10
PAIN_FUNCTIONAL_ASSESSMENT: UNABLE TO SELF-REPORT
PAIN_FUNCTIONAL_ASSESSMENT: 0-10
PAIN_FUNCTIONAL_ASSESSMENT: 0-10
PAIN_FUNCTIONAL_ASSESSMENT: UNABLE TO SELF-REPORT
PAIN_FUNCTIONAL_ASSESSMENT: 0-10

## 2025-07-21 NOTE — ASSESSMENT & PLAN NOTE
Joellen Narayan is a 24 y.o. 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 CTPE negative for acute PE, however evidence of left basilar opacities concerning for pneumonia (on Levaquin). Given worsening hyperbilirubinemia, exchange transfusion recommended however patient refused treatment at this time. ACS c/s for possible cholecystectomy, no acute indication at this time and patient also declining at this time. Currently being managed adequately from a pain crisis standpoint, will continue to monitor for hyperbilirubinemia improvement and Hgb appears stable at this time.     Weaning pain meds with plan to discharge 7/22 pending improvement of labs    # Hgb SS Disease acute on chronic pain   # C/f Acute Chest Syndrome   # CAP  - OARRS reviewed on admission, no aberrancies noted (last filled 5/22 oxy ER qty 28 tabs, qty 14 days, and 5/16 oxy IR qty 28 tabs 7 days)  - pt reports he has no home oxycodone at home and has been calling refill line and pharmacy without success or hearing back   - No carepath available   - Primary hematologist: Dr. Cruz - updated multiple times throughout admission, recommending RBC exchange, patient declining   - CTPE negative for acute PE, however evidence of left basilar opacities concerning for pneumonia   - Repeat hemoglobin identification pending   - IV Levaquin transitioned to PO (7/16-current), plan to treat x7 days, stop date 7/22  - s/p 4mg IVP dilaudid q2 PRN severe pain (7/15- 7/16)  - 7/21 rotating IVP dilaudid 5mg q4 hours PRN  severe pain with PO oxycodone 15mg Q4   - s/p toradol 30mg q6 sched x3 days with PPI support   - continue home oxycontin ER 30 mg BID  - Continue lidocaine patches  - c/w home folic acid 1mg daily   - supportive care: lidocaine patches, hot packs   - bowel regimen for opioid induced constipation, zofran for opioid induced nausea, and benadryl for opioid induced pruritus, added senokot BID, 1x dose lactulose 7/21     # Hx Choledocholithiasis 7/2024  # Hyperbilirubinemia   - Worsening hemolysis in setting of vaso-occlusive crisis could be contributing to increased hyperbilirubinemia    - no abdominal pain, afebrile, leukocytosis increased from baseline and nausea/vomiting x2 days, now improving (7/17)  - July 2024 s/p ERCP with biliary sphincterotomy where sludge and stones were removed, achieving complete clearance    - 1/31/25 was planned for cholecystectomy with Dr. Dove but no show on day of surgery and again 2/13 and 2/27    - Tbili significantly elevated this admission-->40.9 (7/18), now down trending   - S/p D5 1/2NS IVF, pt now drinking fluids  - RUQ ultrasound (7/17) shows hepatomegaly, small volume biliary sludge and gallstones without evidence of cholecystitis or biliary dilation.  - GI consulted (7/17), rec consulting general surgery; hyperbilirubinemia likely 2/2 hemolysis in setting of acute sickle cell crisis  - ACS consulted (7/17) for possible cholecystectomy, discussed gallbladder removal with patient, patient declining at this time, no acute interventions recommended--- request for outpatient FU with Dr. Dove placed 7/21     # Hx recent Septic Arthritis R elbow   - Ortho surgeon: Dr. Tello   - 5/9 MRI w/anesthesia R radius/humerus showing concern for septic arthritis, osteomyelitis, myositis, severe muscle and subcutaneous soft tissue edema, and possible cellulitis   - 5/10 OR s/p R elbow I&D with ortho    - s/p IV daptomycin 6mg/kg q24h and ertapenem 1g q24h until 6/20/25 per ID  - 6/9 Most  recent Ortho visit, sutures removed, cont to work on ROM, pt declining PT, fuv in 6-8 weeks for xrays of R elbow, upcoming 8/18   -- this admission--Right elbow wound open, likely from positioning, RN wound care with honey and now improving      # Hx of nocturnal oxygen dependence   - On 2-3L at night, however patient reports daytime hypoxia as well in the past, currently on 2L      # Hx Priapism   - No active issues on admit    - Hx previously drained by urology    - Continue home Sudafed 30mg daily PRN priapism     # Nodular lymphocyte predominant Hodgkins lymphoma (NLPHL)     - Follows with Dr. Stoll   - s/p Rituxan and prednisone q3 weeks (C1 1/18/24, C2 2/8/24, Missed C3 d/t sickle cell pain crisis, C4 4/4/24, C5 5/16/24, C6 6/7/24)    - 3/13 No show to onc appt    - 4/9 PET showed interval development of new FDG focus in mid abdomen c/w recurrence   - 6/26 OP with Dr. Stoll, discussed with patient and his mother re; treatment options (including Rituxan maintenance) vs observation, pt deciding options   - due for repeat CT/PET 7/15, will need rescheduled outpatient closer to discharge         DVT ppx: Lovenox  GI ppx: Not indicated    #Dispo  CODE STATUS: Full Code   SURROGATE DECISION MAKER: Melissa Narayan (Parent) [phone] (Mobile)   - DC home likely 7/22 pending pain control and continued improvement of labs   - FU general surgery requested, Sickle cell FU 7/28

## 2025-07-21 NOTE — PROGRESS NOTES
"Joellen Narayan is a 24 y.o. male on day 7 of admission presenting with Sickle cell pain crisis (Multi).    Subjective   Pt seen this morning resting in bed. Pt reports his pain is improving, currently having chills but overall pain getting better. Reports hasn't had a BM recently        Objective     Physical Exam  Vitals reviewed.   HENT:      Head: Normocephalic.     Eyes:      General: Scleral icterus present.       Cardiovascular:      Rate and Rhythm: Normal rate.   Pulmonary:      Effort: Pulmonary effort is normal.   Abdominal:      Palpations: Abdomen is soft.     Skin:     Capillary Refill: Capillary refill takes less than 2 seconds.      Coloration: Skin is jaundiced.      Comments: Left elbow open wound, healing, no active bleeding      Neurological:      Mental Status: He is alert and oriented to person, place, and time.     Psychiatric:         Mood and Affect: Mood normal.         Behavior: Behavior normal.         Last Recorded Vitals  Blood pressure 133/84, pulse 76, temperature 36.3 °C (97.3 °F), temperature source Temporal, resp. rate 16, height 1.88 m (6' 2.02\"), weight 70.3 kg (155 lb), SpO2 93%.  Intake/Output last 3 Shifts:  I/O last 3 completed shifts:  In: 2950 (42 mL/kg) [P.O.:2600; IV Piggyback:350]  Out: 3400 (48.4 mL/kg) [Urine:3400 (1.3 mL/kg/hr)]  Weight: 70.3 kg     Assessment & Plan  Sickle cell pain crisis (Multi)  Joellen Narayan is a 24 y.o. 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 CTPE negative for acute PE, however evidence " of left basilar opacities concerning for pneumonia (on Levaquin). Given worsening hyperbilirubinemia, exchange transfusion recommended however patient refused treatment at this time. ACS c/s for possible cholecystectomy, no acute indication at this time and patient also declining at this time. Currently being managed adequately from a pain crisis standpoint, will continue to monitor for hyperbilirubinemia improvement and Hgb appears stable at this time.     Weaning pain meds with plan to discharge 7/22 pending improvement of labs    # Hgb SS Disease acute on chronic pain   # C/f Acute Chest Syndrome   # CAP  - OARRS reviewed on admission, no aberrancies noted (last filled 5/22 oxy ER qty 28 tabs, qty 14 days, and 5/16 oxy IR qty 28 tabs 7 days)  - pt reports he has no home oxycodone at home and has been calling refill line and pharmacy without success or hearing back   - No carepath available   - Primary hematologist: Dr. Cruz - updated multiple times throughout admission, recommending RBC exchange, patient declining   - CTPE negative for acute PE, however evidence of left basilar opacities concerning for pneumonia   - Repeat hemoglobin identification pending   - IV Levaquin transitioned to PO (7/16-current), plan to treat x7 days, stop date 7/22  - s/p 4mg IVP dilaudid q2 PRN severe pain (7/15- 7/16)  - 7/21 rotating IVP dilaudid 5mg q4 hours PRN severe pain with PO oxycodone 15mg Q4   - s/p toradol 30mg q6 sched x3 days with PPI support   - continue home oxycontin ER 30 mg BID  - Continue lidocaine patches  - c/w home folic acid 1mg daily   - supportive care: lidocaine patches, hot packs   - bowel regimen for opioid induced constipation, zofran for opioid induced nausea, and benadryl for opioid induced pruritus, added senokot BID, 1x dose lactulose 7/21     # Hx Choledocholithiasis 7/2024  # Hyperbilirubinemia   - Worsening hemolysis in setting of vaso-occlusive crisis could be contributing to increased  hyperbilirubinemia    - no abdominal pain, afebrile, leukocytosis increased from baseline and nausea/vomiting x2 days, now improving (7/17)  - July 2024 s/p ERCP with biliary sphincterotomy where sludge and stones were removed, achieving complete clearance    - 1/31/25 was planned for cholecystectomy with Dr. Dove but no show on day of surgery and again 2/13 and 2/27    - Tbili significantly elevated this admission-->40.9 (7/18), now down trending   - S/p D5 1/2NS IVF, pt now drinking fluids  - RUQ ultrasound (7/17) shows hepatomegaly, small volume biliary sludge and gallstones without evidence of cholecystitis or biliary dilation.  - GI consulted (7/17), rec consulting general surgery; hyperbilirubinemia likely 2/2 hemolysis in setting of acute sickle cell crisis  - ACS consulted (7/17) for possible cholecystectomy, discussed gallbladder removal with patient, patient declining at this time, no acute interventions recommended--- request for outpatient FU with Dr. Dove placed 7/21     # Hx recent Septic Arthritis R elbow   - Ortho surgeon: Dr. Tello   - 5/9 MRI w/anesthesia R radius/humerus showing concern for septic arthritis, osteomyelitis, myositis, severe muscle and subcutaneous soft tissue edema, and possible cellulitis   - 5/10 OR s/p R elbow I&D with ortho    - s/p IV daptomycin 6mg/kg q24h and ertapenem 1g q24h until 6/20/25 per ID  - 6/9 Most recent Ortho visit, sutures removed, cont to work on ROM, pt declining PT, fuv in 6-8 weeks for xrays of R elbow, upcoming 8/18   -- this admission--Right elbow wound open, likely from positioning, RN wound care with honey and now improving      # Hx of nocturnal oxygen dependence   - On 2-3L at night, however patient reports daytime hypoxia as well in the past, currently on 2L      # Hx Priapism   - No active issues on admit    - Hx previously drained by urology    - Continue home Sudafed 30mg daily PRN priapism     # Nodular lymphocyte predominant Hodgkins  lymphoma (NLPHL)     - Follows with Dr. Stoll   - s/p Rituxan and prednisone q3 weeks (C1 1/18/24, C2 2/8/24, Missed C3 d/t sickle cell pain crisis, C4 4/4/24, C5 5/16/24, C6 6/7/24)    - 3/13 No show to onc appt    - 4/9 PET showed interval development of new FDG focus in mid abdomen c/w recurrence   - 6/26 OP with Dr. Stoll, discussed with patient and his mother re; treatment options (including Rituxan maintenance) vs observation, pt deciding options   - due for repeat CT/PET 7/15, will need rescheduled outpatient closer to discharge         DVT ppx: Lovenox  GI ppx: Not indicated    #Dispo  CODE STATUS: Full Code   SURROGATE DECISION MAKER: Melissa Narayan (Parent) [phone] (Mobile)   - DC home likely 7/22 pending pain control and continued improvement of labs   - FU general surgery requested, Sickle cell FU 7/28      RAAD Lantigua-CNP

## 2025-07-21 NOTE — PROGRESS NOTES
Spiritual Care Visit  Spiritual Care Request    Reason for Visit:  Routine Visit: Follow-up  Continue Visiting: Yes     Request Received From:  Referral From: Other (Comment) (CCLS)    Focus of Care:  Visited With: Patient       Refer to :  Referral To:        Spiritual Care Annotation    Annotation:   visited patient Joellen Narayan. Patient welcomed visit from facility [facility]. Hand  provided to him before and after visit. Patient shared he did not sleep much last night and felt exhausted. He shared that he would maybe discharge tomorrow as he is feeling a bit better.  provided care through supportive conversation. Patient was appreciative of visit and did not have any further needs at this time. Spiritual Care remains available as needed/requested.    Rev. Diana Álvarez MDiv, BCC

## 2025-07-21 NOTE — CARE PLAN
The patient's goals for the shift include      The clinical goals for the shift include pt will reate pain <7

## 2025-07-21 NOTE — CONSULTS
Wound Care Consult     Visit Date: 7/21/2025      Patient Name: Joellen Narayan         MRN: [id number]             Reason for Consult: Dehisced incision to right elbow.        Wound History:Joellen Narayan is a 24 y.o. 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         Assessment:  Wound 05/10/25 Surgical Arm Right;Upper (Active)   Date First Assessed/Time First Assessed: 05/10/25 1157   Primary Wound Type: Surgical  Location: Arm  Wound Location Orientation: Right;Upper      Assessments 7/21/2025  1:14 PM   Present on Admission to Healthcare Facility Yes   Wound Image     Site Assessment Red   Promise-Wound Assessment Clean;Intact   Shape Linear, dehisced incision   Wound Length (cm) 2 cm   Wound Width (cm) 0.6 cm   Wound Surface Area (cm^2) 0.94 cm^2   Wound Depth (cm) 0.1 cm   Wound Volume (cm^3) 0.063 cm^3   Margins Well-defined edges   Closure Dehisced   Treatments Cleansed   Sutures/Staple Line Non approximated   Drainage Description Serosanguineous   Drainage Amount Small   Dressing Enzymatic debriders;Silicone border dressing   Dressing Changed New   Dressing Status Clean;Dry       Inactive Orders   Date Order Priority Status Authorizing Provider   07/20/25 1029 Inpatient Consult to Wound and Ostomy Nurse Routine Completed Alex Parnell MD     - Reason for Consult?:    Wound   Patient states he had a small bump form to his right elbow, this eventually opened and began draining pus. Patient had surgery a month ago. States the incision healed, and now is  draining serosanguinous drainage.   Recommendations:  - Right elbow, Clean with Vashe, Apply Medihoney to open wound , cover with Mepilex and change daily.    Wound Plan: Wound team will not continue to follow. Please re-consult for worsening of wounds      SUZANNE GERHARDT, RN, BSN, CWOCN  7/21/2025  5:02 PM

## 2025-07-21 NOTE — CARE PLAN
The clinical goals for the shift include pt will remain safe and free from injury throughout shift 7/21/2025 0700    Problem: Pain - Adult  Goal: Verbalizes/displays adequate comfort level or baseline comfort level  Outcome: Progressing     Problem: Safety - Adult  Goal: Free from fall injury  Outcome: Progressing     Problem: Discharge Planning  Goal: Discharge to home or other facility with appropriate resources  Outcome: Progressing     Problem: Chronic Conditions and Co-morbidities  Goal: Patient's chronic conditions and co-morbidity symptoms are monitored and maintained or improved  Outcome: Progressing     Problem: Pain  Goal: Takes deep breaths with improved pain control throughout the shift  Outcome: Progressing  Goal: Turns in bed with improved pain control throughout the shift  Outcome: Progressing  Goal: Walks with improved pain control throughout the shift  Outcome: Progressing  Goal: Performs ADL's with improved pain control throughout shift  Outcome: Progressing  Goal: Free from opioid side effects throughout the shift  Outcome: Progressing  Goal: Free from acute confusion related to pain meds throughout the shift  Outcome: Progressing

## 2025-07-22 LAB
ALBUMIN SERPL BCP-MCNC: 4.5 G/DL (ref 3.4–5)
ALP SERPL-CCNC: 163 U/L (ref 33–120)
ALT SERPL W P-5'-P-CCNC: 56 U/L (ref 10–52)
ANION GAP SERPL CALC-SCNC: 11 MMOL/L (ref 10–20)
AST SERPL W P-5'-P-CCNC: 69 U/L (ref 9–39)
BASOPHILS # BLD AUTO: 0.05 X10*3/UL (ref 0–0.1)
BASOPHILS NFR BLD AUTO: 0.4 %
BILIRUB SERPL-MCNC: 18.5 MG/DL (ref 0–1.2)
BUN SERPL-MCNC: 7 MG/DL (ref 6–23)
CALCIUM SERPL-MCNC: 9.7 MG/DL (ref 8.6–10.6)
CHLORIDE SERPL-SCNC: 95 MMOL/L (ref 98–107)
CO2 SERPL-SCNC: 31 MMOL/L (ref 21–32)
CREAT SERPL-MCNC: 0.63 MG/DL (ref 0.5–1.3)
EGFRCR SERPLBLD CKD-EPI 2021: >90 ML/MIN/1.73M*2
EOSINOPHIL # BLD AUTO: 0.82 X10*3/UL (ref 0–0.7)
EOSINOPHIL NFR BLD AUTO: 7.2 %
ERYTHROCYTE [DISTWIDTH] IN BLOOD BY AUTOMATED COUNT: 19 % (ref 11.5–14.5)
GLUCOSE SERPL-MCNC: 81 MG/DL (ref 74–99)
HCT VFR BLD AUTO: 26.2 % (ref 41–52)
HGB BLD-MCNC: 9 G/DL (ref 13.5–17.5)
HGB RETIC QN: 31 PG (ref 28–38)
IMM GRANULOCYTES # BLD AUTO: 0.18 X10*3/UL (ref 0–0.7)
IMM GRANULOCYTES NFR BLD AUTO: 1.6 % (ref 0–0.9)
IMMATURE RETIC FRACTION: 22 %
LDH SERPL L TO P-CCNC: 509 U/L (ref 84–246)
LYMPHOCYTES # BLD AUTO: 2.46 X10*3/UL (ref 1.2–4.8)
LYMPHOCYTES NFR BLD AUTO: 21.5 %
MCH RBC QN AUTO: 30.2 PG (ref 26–34)
MCHC RBC AUTO-ENTMCNC: 34.4 G/DL (ref 32–36)
MCV RBC AUTO: 88 FL (ref 80–100)
MONOCYTES # BLD AUTO: 1.57 X10*3/UL (ref 0.1–1)
MONOCYTES NFR BLD AUTO: 13.7 %
NEUTROPHILS # BLD AUTO: 6.35 X10*3/UL (ref 1.2–7.7)
NEUTROPHILS NFR BLD AUTO: 55.6 %
NRBC BLD-RTO: 3.1 /100 WBCS (ref 0–0)
PLATELET # BLD AUTO: 324 X10*3/UL (ref 150–450)
POTASSIUM SERPL-SCNC: 3.9 MMOL/L (ref 3.5–5.3)
PROT SERPL-MCNC: 7.7 G/DL (ref 6.4–8.2)
RBC # BLD AUTO: 2.98 X10*6/UL (ref 4.5–5.9)
RETICS #: 0.6 X10*6/UL (ref 0.02–0.12)
RETICS/RBC NFR AUTO: 20.1 % (ref 0.5–2)
SODIUM SERPL-SCNC: 133 MMOL/L (ref 136–145)
WBC # BLD AUTO: 11.4 X10*3/UL (ref 4.4–11.3)

## 2025-07-22 PROCEDURE — 2500000001 HC RX 250 WO HCPCS SELF ADMINISTERED DRUGS (ALT 637 FOR MEDICARE OP)

## 2025-07-22 PROCEDURE — 99233 SBSQ HOSP IP/OBS HIGH 50: CPT

## 2025-07-22 PROCEDURE — 85045 AUTOMATED RETICULOCYTE COUNT: CPT

## 2025-07-22 PROCEDURE — 84075 ASSAY ALKALINE PHOSPHATASE: CPT

## 2025-07-22 PROCEDURE — 2500000005 HC RX 250 GENERAL PHARMACY W/O HCPCS: Performed by: EMERGENCY MEDICINE

## 2025-07-22 PROCEDURE — 1170000001 HC PRIVATE ONCOLOGY ROOM DAILY

## 2025-07-22 PROCEDURE — 2500000004 HC RX 250 GENERAL PHARMACY W/ HCPCS (ALT 636 FOR OP/ED)

## 2025-07-22 PROCEDURE — RXMED WILLOW AMBULATORY MEDICATION CHARGE

## 2025-07-22 PROCEDURE — 85025 COMPLETE CBC W/AUTO DIFF WBC: CPT

## 2025-07-22 PROCEDURE — 83615 LACTATE (LD) (LDH) ENZYME: CPT

## 2025-07-22 PROCEDURE — 2500000004 HC RX 250 GENERAL PHARMACY W/ HCPCS (ALT 636 FOR OP/ED): Mod: JW,TB

## 2025-07-22 RX ORDER — DEXTROSE MONOHYDRATE AND SODIUM CHLORIDE 5; .45 G/100ML; G/100ML
75 INJECTION, SOLUTION INTRAVENOUS CONTINUOUS
Status: ACTIVE | OUTPATIENT
Start: 2025-07-22 | End: 2025-07-23

## 2025-07-22 RX ORDER — ONDANSETRON HYDROCHLORIDE 2 MG/ML
4 INJECTION, SOLUTION INTRAVENOUS EVERY 6 HOURS PRN
Status: DISCONTINUED | OUTPATIENT
Start: 2025-07-22 | End: 2025-07-23

## 2025-07-22 RX ORDER — ACETAMINOPHEN 325 MG/1
650 TABLET ORAL ONCE
Status: COMPLETED | OUTPATIENT
Start: 2025-07-22 | End: 2025-07-22

## 2025-07-22 RX ORDER — OXYCODONE HYDROCHLORIDE 15 MG/1
15 TABLET ORAL EVERY 4 HOURS PRN
Qty: 28 TABLET | Refills: 0 | Status: SHIPPED | OUTPATIENT
Start: 2025-07-22 | End: 2025-07-29

## 2025-07-22 RX ADMIN — HYDROMORPHONE HYDROCHLORIDE 5 MG: 2 INJECTION, SOLUTION INTRAMUSCULAR; INTRAVENOUS; SUBCUTANEOUS at 00:50

## 2025-07-22 RX ADMIN — HYDROMORPHONE HYDROCHLORIDE 5 MG: 2 INJECTION, SOLUTION INTRAMUSCULAR; INTRAVENOUS; SUBCUTANEOUS at 05:13

## 2025-07-22 RX ADMIN — SENNOSIDES AND DOCUSATE SODIUM 1 TABLET: 50; 8.6 TABLET ORAL at 20:57

## 2025-07-22 RX ADMIN — HYDROMORPHONE HYDROCHLORIDE 5 MG: 2 INJECTION, SOLUTION INTRAMUSCULAR; INTRAVENOUS; SUBCUTANEOUS at 14:05

## 2025-07-22 RX ADMIN — OXYCODONE HYDROCHLORIDE 15 MG: 10 TABLET ORAL at 11:57

## 2025-07-22 RX ADMIN — DEXTROSE AND SODIUM CHLORIDE 75 ML/HR: 5; .45 INJECTION, SOLUTION INTRAVENOUS at 10:35

## 2025-07-22 RX ADMIN — LEVOFLOXACIN 750 MG: 750 TABLET, FILM COATED ORAL at 10:33

## 2025-07-22 RX ADMIN — OXYCODONE HYDROCHLORIDE 15 MG: 10 TABLET ORAL at 03:07

## 2025-07-22 RX ADMIN — OXYCODONE HYDROCHLORIDE 15 MG: 10 TABLET ORAL at 07:28

## 2025-07-22 RX ADMIN — HYDROMORPHONE HYDROCHLORIDE 5 MG: 2 INJECTION, SOLUTION INTRAMUSCULAR; INTRAVENOUS; SUBCUTANEOUS at 20:57

## 2025-07-22 RX ADMIN — OXYCODONE HYDROCHLORIDE 30 MG: 20 TABLET, FILM COATED, EXTENDED RELEASE ORAL at 20:57

## 2025-07-22 RX ADMIN — Medication 2 L/MIN: at 09:00

## 2025-07-22 RX ADMIN — OXYCODONE HYDROCHLORIDE 30 MG: 20 TABLET, FILM COATED, EXTENDED RELEASE ORAL at 09:00

## 2025-07-22 RX ADMIN — Medication 2 L/MIN: at 20:58

## 2025-07-22 RX ADMIN — FOLIC ACID 1 MG: 1 TABLET ORAL at 09:00

## 2025-07-22 RX ADMIN — PANTOPRAZOLE SODIUM 40 MG: 40 TABLET, DELAYED RELEASE ORAL at 05:13

## 2025-07-22 RX ADMIN — ACETAMINOPHEN 650 MG: 325 TABLET ORAL at 10:43

## 2025-07-22 RX ADMIN — HYDROMORPHONE HYDROCHLORIDE 5 MG: 2 INJECTION, SOLUTION INTRAMUSCULAR; INTRAVENOUS; SUBCUTANEOUS at 09:28

## 2025-07-22 RX ADMIN — OXYCODONE HYDROCHLORIDE 15 MG: 10 TABLET ORAL at 18:09

## 2025-07-22 ASSESSMENT — PAIN SCALES - GENERAL
PAINLEVEL_OUTOF10: 6
PAINLEVEL_OUTOF10: 6
PAINLEVEL_OUTOF10: 5 - MODERATE PAIN
PAINLEVEL_OUTOF10: 9
PAINLEVEL_OUTOF10: 7
PAINLEVEL_OUTOF10: 9
PAINLEVEL_OUTOF10: 8
PAINLEVEL_OUTOF10: 8
PAINLEVEL_OUTOF10: 9
PAINLEVEL_OUTOF10: 7
PAINLEVEL_OUTOF10: 9
PAINLEVEL_OUTOF10: 8
PAINLEVEL_OUTOF10: 9

## 2025-07-22 ASSESSMENT — PAIN - FUNCTIONAL ASSESSMENT
PAIN_FUNCTIONAL_ASSESSMENT: 0-10
PAIN_FUNCTIONAL_ASSESSMENT: UNABLE TO SELF-REPORT
PAIN_FUNCTIONAL_ASSESSMENT: 0-10
PAIN_FUNCTIONAL_ASSESSMENT: UNABLE TO SELF-REPORT
PAIN_FUNCTIONAL_ASSESSMENT: 0-10

## 2025-07-22 ASSESSMENT — COGNITIVE AND FUNCTIONAL STATUS - GENERAL
DAILY ACTIVITIY SCORE: 24
MOBILITY SCORE: 23
CLIMB 3 TO 5 STEPS WITH RAILING: A LITTLE

## 2025-07-22 ASSESSMENT — PAIN DESCRIPTION - DESCRIPTORS: DESCRIPTORS: ACHING

## 2025-07-22 ASSESSMENT — PAIN DESCRIPTION - LOCATION: LOCATION: CHEST

## 2025-07-22 NOTE — PROGRESS NOTES
"Joellen Narayan is a 24 y.o. male on day 8 of admission presenting with Sickle cell pain crisis (Multi).    Subjective   Patient seen resting in bed. Reports feeling unwell this morning. States that he has not been able to keep a meal down since Saturday on 7/19 due to persistent N/V. States that PO zofran makes him more nauseas. Initially planned for discharge today, however, patient not feeling well enough for dc today. We discussed plan for IVF today and nausea control with IV zofran and compazine.        Objective     Physical Exam  Constitutional:       Appearance: Normal appearance.   HENT:      Head: Normocephalic.      Mouth/Throat:      Mouth: Mucous membranes are moist.     Eyes:      General: Scleral icterus present.      Pupils: Pupils are equal, round, and reactive to light.       Cardiovascular:      Rate and Rhythm: Normal rate and regular rhythm.      Pulses: Normal pulses.      Heart sounds: Normal heart sounds.   Pulmonary:      Effort: Pulmonary effort is normal.      Breath sounds: Normal breath sounds.   Abdominal:      General: Abdomen is flat.      Palpations: Abdomen is soft.     Musculoskeletal:         General: Normal range of motion.      Cervical back: Normal range of motion and neck supple.     Skin:     General: Skin is warm.     Neurological:      General: No focal deficit present.      Mental Status: He is alert.     Psychiatric:         Mood and Affect: Mood normal.         Last Recorded Vitals  Blood pressure 130/89, pulse 73, temperature 35.4 °C (95.7 °F), temperature source Temporal, resp. rate 18, height 1.88 m (6' 2.02\"), weight 70.3 kg (155 lb), SpO2 96%.  Intake/Output last 3 Shifts:  I/O last 3 completed shifts:  In: 1750 (24.9 mL/kg) [P.O.:1750]  Out: 1650 (23.5 mL/kg) [Urine:1650 (1 mL/kg/hr)]  Weight: 70.3 kg     Relevant Results                     Scheduled medications  Scheduled Medications[1]  Continuous medications  Continuous Medications[2]  PRN medications  PRN " Medications[3]    Results for orders placed or performed during the hospital encounter of 07/14/25 (from the past 24 hours)   Comprehensive metabolic panel   Result Value Ref Range    Glucose 81 74 - 99 mg/dL    Sodium 133 (L) 136 - 145 mmol/L    Potassium 3.9 3.5 - 5.3 mmol/L    Chloride 95 (L) 98 - 107 mmol/L    Bicarbonate 31 21 - 32 mmol/L    Anion Gap 11 10 - 20 mmol/L    Urea Nitrogen 7 6 - 23 mg/dL    Creatinine 0.63 0.50 - 1.30 mg/dL    eGFR >90 >60 mL/min/1.73m*2    Calcium 9.7 8.6 - 10.6 mg/dL    Albumin 4.5 3.4 - 5.0 g/dL    Alkaline Phosphatase 163 (H) 33 - 120 U/L    Total Protein 7.7 6.4 - 8.2 g/dL    AST 69 (H) 9 - 39 U/L    Bilirubin, Total 18.5 (H) 0.0 - 1.2 mg/dL    ALT 56 (H) 10 - 52 U/L   CBC and Auto Differential   Result Value Ref Range    WBC 11.4 (H) 4.4 - 11.3 x10*3/uL    nRBC 3.1 (H) 0.0 - 0.0 /100 WBCs    RBC 2.98 (L) 4.50 - 5.90 x10*6/uL    Hemoglobin 9.0 (L) 13.5 - 17.5 g/dL    Hematocrit 26.2 (L) 41.0 - 52.0 %    MCV 88 80 - 100 fL    MCH 30.2 26.0 - 34.0 pg    MCHC 34.4 32.0 - 36.0 g/dL    RDW 19.0 (H) 11.5 - 14.5 %    Platelets 324 150 - 450 x10*3/uL    Neutrophils % 55.6 40.0 - 80.0 %    Immature Granulocytes %, Automated 1.6 (H) 0.0 - 0.9 %    Lymphocytes % 21.5 13.0 - 44.0 %    Monocytes % 13.7 2.0 - 10.0 %    Eosinophils % 7.2 0.0 - 6.0 %    Basophils % 0.4 0.0 - 2.0 %    Neutrophils Absolute 6.35 1.20 - 7.70 x10*3/uL    Immature Granulocytes Absolute, Automated 0.18 0.00 - 0.70 x10*3/uL    Lymphocytes Absolute 2.46 1.20 - 4.80 x10*3/uL    Monocytes Absolute 1.57 (H) 0.10 - 1.00 x10*3/uL    Eosinophils Absolute 0.82 (H) 0.00 - 0.70 x10*3/uL    Basophils Absolute 0.05 0.00 - 0.10 x10*3/uL   Lactate Dehydrogenase   Result Value Ref Range     (H) 84 - 246 U/L   Reticulocytes   Result Value Ref Range    Retic % 20.1 (H) 0.5 - 2.0 %    Retic Absolute 0.598 (H) 0.022 - 0.118 x10*6/uL    Reticulocyte Hemoglobin 31 28 - 38 pg    Immature Retic fraction 22.0 (H) <=16.0 %     *Note:  Due to a large number of results and/or encounters for the requested time period, some results have not been displayed. A complete set of results can be found in Results Review.              Assessment & Plan  Sickle cell pain crisis (Multi)    Joellen Narayan is a 24 y.o. 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 Cxr w/o evidence of acute process 7/14 and 7/15. EKG WNL. Hemolysis labs significantly elevated 7/15 and pain worsening reflecting vaso-occlusive crisis. 7/15 CT Spine w/o acute process. Started on IV dilaudid for pain control. S/p 1 unit pRBCs on 7/15 with poor incrementation in hgb, bili increased to 27 (7/16). Dr. Cruz recommends RBC exchange, however patient refusing at this time. CT PE negative for acute PE, does show increase in left basilar opacities concerning for pneumonia, started on IV Levaquin (7/16-). Bilirubin continuing to increase to 37.3 on 7/17 --> 40.9 on 7/18 -->39.7 on 7/19, Tbili now downtrending as of 7/20. On 7/18, Dr. Cruz continues to recommend RBCex, however, patient continues to refuse RBC exchange. GI consulted 7/17, recs consulting general surgery for possible cholecystectomy. General surgery consulted 7/17, discussed cholecystectomy with patient, patient declining at this time, no acute interventions. Hgb 6.9 (7/18), 1 unit pRBC ordered per Dr. Cruz with appropriate incrementation of hemoglobin on 7/19. Patient with increased N/V on 7/22, plan 24 hours of IVF hydration and IV antiemetics. DC home resumed nox O2 pending improvement in  pain and acute sickle cell crisis and N/V.      Updates 7/22:  - Increased N/V since 7/20. Plan IVF resuscitation today with antiemetic control with IV Zofran and compazine   - Continue PO / IV rotating   - Completes course of IV levaquin today for treatment of CAP      # Hgb SS disease acute on chronic pain   # C/f acute chest  # pneumonia  - [facility] reviewed on admission, no aberrancies noted (last filled 5/22 oxy ER qty 28 tabs, qty 14 days, and 5/16 oxy IR qty 28 tabs 7 days)  - pt reports he has no home oxycodone at home and has been calling refill line and pharmacy without success or hearing back   - No carepath available   - Primary hematologist: Dr. Cruz - updated 7/16, 7/17, 7/18, recommends RBC exchange transfusion, however patient refusing on 7/16, 7/17, and 7/18, does not want apheresis line placed   - CXR (7/14 and 7/15) w/o evidence of acute process; Repeat CXR (7/18) small retrocardiac opacity favored to represent atelectasis v. Underlying infection   - CT PE (7/16) negative for acute PE, does show increasing left basilar opacities concerning for pneumonia  - CT Spine (7/15) w/o acute process   - EKG in ED showed sinus rhythm, no ST elevation or ischemic changes, trop pending   - Hg 7.8 (7/15), (baseline ~7-8); s/p 1 unit pRBC 7/15->8.2 (7/16)-> 7.1 (7/17)->6.9 (7/18); s/p 1 unit pRBCs 7/18, Hgb 8.1 (7/22)   - Tbili 10.4 (7/14) BL ~ 8-10;->27 (7/16)->37.3 (7/17)->40.9 (7/18) --> 39 (7/19); Tbili continues to downtrend as of 7/22   -  (7/14)->846 (7/16)->709 (7/17)->626 (7/18) -> 606 (7/22)  - WBC 15.5 (7/16)-> 10.7 (7/17)->9.8 (7/18) --> 12.6 (7/22)   - Hg S 75% (7/14); repeat 56.6% on 7/19 ordered per Dr. Nancy Espino utox + MJ (7/14)   - Continue with IV levaquin (7/16-7/22)   - s/p 4mg IVP dilaudid q2 PRN severe pain (7/15- 7/16)  - s/p IVP dilaudid 5mg q2 hours PRN severe pain (7/15-7/21)   - Continue with rotating IVP dilaudid 5mg q4 hours rotating q2 hours with PO oxycodone 15mg q4 hours  (7/21-current)   - s/p toradol 30mg q6 sched x3 days with PPI support   - re-started home oxycontin ER 30 mg BID on admit   - Continue lidocaine patches  - c/w home folic acid 1mg daily   - s/p D51/2  mL/hr x 1 day (7/16); s/p D51/2NS 75 mL/hr x 1 day (7/17)  - supportive care: lidocaine patches, hot packs   - bowel regimen for opioid induced constipation, zofran for opioid induced nausea, and benadryl for opioid induced pruritis      # Hx choledocholithiasis 7/2024  # hyperbilirubinemia   - Worsening hemolysis in setting of vaso-occlusive crisis could be contributing to increased hyperbilirubinemia    - no abdominal pain, afebrile, leukocytosis increased from baseline and nausea/vomiting x2 days, now improving (7/17)  - July 2024 s/p ERCP with biliary sphincterotomy where sludge and stones were removed, achieving complete clearance    - 1/31/25 was planned for cholecystectomy with Dr. Dove but no show on day of surgery and again 2/13 and 2/27    - Tbili 10.4 (7/14) BL ~ 8-10;->27 (7/16)->37.3 (7/17)->40.9 (7/18) --> 39 (7/19); Tbili continues to downtrend as of 7/22   - S/p D5 1/2NS @75ml/h x24h (7/15), increased to 100 mL/hr (7/16), 75 mL/hr x 1 day (7/17)  - RUQ ultrasound (7/17) shows hepatomegaly, small volume biliary sludge and gallstones without evidence of cholecystitis or biliary dilation.  - GI consulted (7/17), rec consulting general surgery; hyperbilirubinemia likely 2/2 hemolysis in setting of acute sickle cell crisis  - ACS consulted (7/17) for possible cholecystectomy, discussed gallbladder removal with patient, patient declining at this time, no acute interventions recommended  - Patient has FUV with gen surg on 7/31      # Hx recent Septic Arthritis R elbow   - Ortho surgeon: Dr. Tello   - 5/9 MRI w/anesthesia R radius/humerus showing concern for septic arthritis, osteomyelitis, myositis, severe muscle and subcutaneous soft tissue edema, and possible cellulitis   - 5/10 OR s/p R elbow I&D  with ortho    - s/p IV daptomycin 6mg/kg q24h and ertapenem 1g q24h until 6/20/25 per ID  - 6/9 Most recent Ortho visit, sutures removed, cont to work on ROM, pt declining PT, fuv in 6-8 weeks for xrays of R elbow, upcoming 8/18      # Hx of nocturnal oxygen dependence   - On 2-3L at night, however patient reports daytime hypoxia as well in the past, currently on 2L      # Hx Priapism   - No active issues on admit    - Hx previously drained by urology    - Continue home Sudafed 30mg daily PRN priapism     # Nodular lymphocyte predominant Hodgkins lymphoma (NLPHL)     - Follows with Dr. Stoll   - s/p Rituxan and prednisone q3 weeks (C1 1/18/24, C2 2/8/24, Missed C3 d/t sickle cell pain crisis, C4 4/4/24, C5 5/16/24, C6 6/7/24)    - 3/13 No show to onc appt    - 4/9 PET showed interval development of new FDG focus in mid abdomen c/w recurrence   - 6/26 OP with Dr. Stoll, discussed with patient and his mother re; treatment options (including Rituxan maintenance) vs observation, pt deciding options   - PET on 10/21 and FUV with Dr. Stoll on 11/6      # Prophy   - s/p x6 weeks ASA 81mg BID postop, stopped 6/21  - lovenox   - SCDs, encouraged ambulation       # dispo   - Full code  - DC home resumed noc O2 pending improvement in pain and hemolysis labs   - FUV 7/28 sickle cell, 8/18 Pulm, 8/18 Ortho, PET 10/21, 11/6 Dr. Stoll         I spent 60 minutes in the professional and overall care of this patient.    Assessment and plan as above discussed with attending physician, Dr. Soheila Stoll, PAKenzieC         [1] enoxaparin, 40 mg, subcutaneous, Daily  folic acid, 1 mg, oral, Daily  levoFLOXacin, 750 mg, oral, q24h  lidocaine, 5 mL, infiltration, Once  lidocaine, 2 patch, transdermal, Daily  oxyCODONE ER, 30 mg, oral, q12h REYNALDO  oxygen, , inhalation, Continuous - Inhalation  pantoprazole, 40 mg, oral, Daily before breakfast  polyethylene glycol, 17 g, oral, Daily  sennosides-docusate sodium, 1 tablet,  oral, BID  [2] dextrose 5%-0.45 % sodium chloride, 75 mL/hr, Last Rate: 75 mL/hr (07/22/25 1035)  [3] PRN medications: diphenhydrAMINE, HYDROmorphone, ondansetron, [Held by provider] ondansetron, oxyCODONE, prochlorperazine, sodium chloride

## 2025-07-22 NOTE — CARE PLAN
The clinical goals for the shift include pt will remain safe and free from injury throughout shift 7/22/2025 0700      Problem: Pain - Adult  Goal: Verbalizes/displays adequate comfort level or baseline comfort level  Outcome: Progressing     Problem: Safety - Adult  Goal: Free from fall injury  Outcome: Progressing     Problem: Pain  Goal: Takes deep breaths with improved pain control throughout the shift  Outcome: Progressing     Problem: Pain  Goal: Turns in bed with improved pain control throughout the shift  Outcome: Progressing     Problem: Pain  Goal: Walks with improved pain control throughout the shift  Outcome: Progressing     Problem: Pain  Goal: Free from opioid side effects throughout the shift  Outcome: Progressing

## 2025-07-22 NOTE — CARE PLAN
Problem: Pain - Adult  Goal: Verbalizes/displays adequate comfort level or baseline comfort level  Outcome: Progressing     Problem: Safety - Adult  Goal: Free from fall injury  Outcome: Progressing     Problem: Discharge Planning  Goal: Discharge to home or other facility with appropriate resources  Outcome: Progressing     Problem: Chronic Conditions and Co-morbidities  Goal: Patient's chronic conditions and co-morbidity symptoms are monitored and maintained or improved  Outcome: Progressing     Problem: Nutrition  Goal: Nutrient intake appropriate for maintaining nutritional needs  Outcome: Progressing     Problem: Pain  Goal: Takes deep breaths with improved pain control throughout the shift  Outcome: Progressing  Goal: Turns in bed with improved pain control throughout the shift  Outcome: Progressing  Goal: Walks with improved pain control throughout the shift  Outcome: Progressing  Goal: Performs ADL's with improved pain control throughout shift  Outcome: Progressing  Goal: Participates in PT with improved pain control throughout the shift  Outcome: Progressing  Goal: Free from opioid side effects throughout the shift  Outcome: Progressing  Goal: Free from acute confusion related to pain meds throughout the shift  Outcome: Progressing    The clinical goals for the shift include Pt will remain HDS and VSS while remaining free from injury on 7/22 until 1900    Patient has remained stable and has been getting his pain medications rotating every two hours. He has been able to ambulate to the bathroom but has had trouble eating.

## 2025-07-23 ENCOUNTER — APPOINTMENT (OUTPATIENT)
Dept: RADIOLOGY | Facility: HOSPITAL | Age: 25
DRG: 811 | End: 2025-07-23
Payer: COMMERCIAL

## 2025-07-23 LAB
ALBUMIN SERPL BCP-MCNC: 4.3 G/DL (ref 3.4–5)
ALP SERPL-CCNC: 137 U/L (ref 33–120)
ALT SERPL W P-5'-P-CCNC: 53 U/L (ref 10–52)
ANION GAP SERPL CALC-SCNC: 15 MMOL/L (ref 10–20)
AST SERPL W P-5'-P-CCNC: 73 U/L (ref 9–39)
BASOPHILS # BLD AUTO: 0.05 X10*3/UL (ref 0–0.1)
BASOPHILS NFR BLD AUTO: 0.4 %
BILIRUB SERPL-MCNC: 15.5 MG/DL (ref 0–1.2)
BUN SERPL-MCNC: 7 MG/DL (ref 6–23)
CALCIUM SERPL-MCNC: 9.8 MG/DL (ref 8.6–10.6)
CHLORIDE SERPL-SCNC: 99 MMOL/L (ref 98–107)
CO2 SERPL-SCNC: 25 MMOL/L (ref 21–32)
CREAT SERPL-MCNC: 0.55 MG/DL (ref 0.5–1.3)
EGFRCR SERPLBLD CKD-EPI 2021: >90 ML/MIN/1.73M*2
EOSINOPHIL # BLD AUTO: 0.74 X10*3/UL (ref 0–0.7)
EOSINOPHIL NFR BLD AUTO: 6.2 %
ERYTHROCYTE [DISTWIDTH] IN BLOOD BY AUTOMATED COUNT: 20.9 % (ref 11.5–14.5)
FLUAV RNA RESP QL NAA+PROBE: NOT DETECTED
FLUBV RNA RESP QL NAA+PROBE: NOT DETECTED
GLUCOSE SERPL-MCNC: 105 MG/DL (ref 74–99)
HCT VFR BLD AUTO: 26.2 % (ref 41–52)
HGB BLD-MCNC: 9 G/DL (ref 13.5–17.5)
HGB RETIC QN: 29 PG (ref 28–38)
IMM GRANULOCYTES # BLD AUTO: 0.2 X10*3/UL (ref 0–0.7)
IMM GRANULOCYTES NFR BLD AUTO: 1.7 % (ref 0–0.9)
IMMATURE RETIC FRACTION: 24.4 %
LDH SERPL L TO P-CCNC: 508 U/L (ref 84–246)
LYMPHOCYTES # BLD AUTO: 2.43 X10*3/UL (ref 1.2–4.8)
LYMPHOCYTES NFR BLD AUTO: 20.2 %
MCH RBC QN AUTO: 30.3 PG (ref 26–34)
MCHC RBC AUTO-ENTMCNC: 34.4 G/DL (ref 32–36)
MCV RBC AUTO: 88 FL (ref 80–100)
MONOCYTES # BLD AUTO: 1.43 X10*3/UL (ref 0.1–1)
MONOCYTES NFR BLD AUTO: 11.9 %
NEUTROPHILS # BLD AUTO: 7.17 X10*3/UL (ref 1.2–7.7)
NEUTROPHILS NFR BLD AUTO: 59.6 %
NRBC BLD-RTO: 2.7 /100 WBCS (ref 0–0)
PLATELET # BLD AUTO: 378 X10*3/UL (ref 150–450)
POTASSIUM SERPL-SCNC: 4 MMOL/L (ref 3.5–5.3)
PROT SERPL-MCNC: 7.2 G/DL (ref 6.4–8.2)
RBC # BLD AUTO: 2.97 X10*6/UL (ref 4.5–5.9)
RETICS #: 0.62 X10*6/UL (ref 0.02–0.12)
RETICS/RBC NFR AUTO: 21 % (ref 0.5–2)
SARS-COV-2 RNA RESP QL NAA+PROBE: NOT DETECTED
SODIUM SERPL-SCNC: 135 MMOL/L (ref 136–145)
WBC # BLD AUTO: 12 X10*3/UL (ref 4.4–11.3)

## 2025-07-23 PROCEDURE — 74018 RADEX ABDOMEN 1 VIEW: CPT

## 2025-07-23 PROCEDURE — 85025 COMPLETE CBC W/AUTO DIFF WBC: CPT

## 2025-07-23 PROCEDURE — 74018 RADEX ABDOMEN 1 VIEW: CPT | Performed by: RADIOLOGY

## 2025-07-23 PROCEDURE — 2500000001 HC RX 250 WO HCPCS SELF ADMINISTERED DRUGS (ALT 637 FOR MEDICARE OP)

## 2025-07-23 PROCEDURE — 85045 AUTOMATED RETICULOCYTE COUNT: CPT

## 2025-07-23 PROCEDURE — 36415 COLL VENOUS BLD VENIPUNCTURE: CPT

## 2025-07-23 PROCEDURE — 2500000004 HC RX 250 GENERAL PHARMACY W/ HCPCS (ALT 636 FOR OP/ED): Mod: JW,TB

## 2025-07-23 PROCEDURE — 80053 COMPREHEN METABOLIC PANEL: CPT

## 2025-07-23 PROCEDURE — 2500000004 HC RX 250 GENERAL PHARMACY W/ HCPCS (ALT 636 FOR OP/ED): Mod: TB

## 2025-07-23 PROCEDURE — 83615 LACTATE (LD) (LDH) ENZYME: CPT

## 2025-07-23 PROCEDURE — 87636 SARSCOV2 & INF A&B AMP PRB: CPT

## 2025-07-23 PROCEDURE — 1170000001 HC PRIVATE ONCOLOGY ROOM DAILY

## 2025-07-23 PROCEDURE — 99233 SBSQ HOSP IP/OBS HIGH 50: CPT

## 2025-07-23 RX ORDER — DEXTROSE MONOHYDRATE AND SODIUM CHLORIDE 5; .45 G/100ML; G/100ML
75 INJECTION, SOLUTION INTRAVENOUS CONTINUOUS
Status: DISCONTINUED | OUTPATIENT
Start: 2025-07-23 | End: 2025-07-24

## 2025-07-23 RX ORDER — SCOPOLAMINE 1 MG/3D
1 PATCH, EXTENDED RELEASE TRANSDERMAL
Status: DISCONTINUED | OUTPATIENT
Start: 2025-07-23 | End: 2025-07-24

## 2025-07-23 RX ORDER — ONDANSETRON HYDROCHLORIDE 2 MG/ML
4 INJECTION, SOLUTION INTRAVENOUS EVERY 6 HOURS PRN
Status: DISCONTINUED | OUTPATIENT
Start: 2025-07-23 | End: 2025-07-24 | Stop reason: HOSPADM

## 2025-07-23 RX ORDER — PROCHLORPERAZINE EDISYLATE 5 MG/ML
10 INJECTION INTRAMUSCULAR; INTRAVENOUS EVERY 6 HOURS PRN
Status: DISCONTINUED | OUTPATIENT
Start: 2025-07-23 | End: 2025-07-24 | Stop reason: HOSPADM

## 2025-07-23 RX ADMIN — OXYCODONE HYDROCHLORIDE 30 MG: 20 TABLET, FILM COATED, EXTENDED RELEASE ORAL at 20:31

## 2025-07-23 RX ADMIN — HYDROMORPHONE HYDROCHLORIDE 5 MG: 2 INJECTION, SOLUTION INTRAMUSCULAR; INTRAVENOUS; SUBCUTANEOUS at 18:20

## 2025-07-23 RX ADMIN — FOLIC ACID 1 MG: 1 TABLET ORAL at 08:49

## 2025-07-23 RX ADMIN — SCOPOLAMINE 1 PATCH: 1.5 PATCH, EXTENDED RELEASE TRANSDERMAL at 12:37

## 2025-07-23 RX ADMIN — SENNOSIDES AND DOCUSATE SODIUM 1 TABLET: 50; 8.6 TABLET ORAL at 20:32

## 2025-07-23 RX ADMIN — OXYCODONE HYDROCHLORIDE 30 MG: 20 TABLET, FILM COATED, EXTENDED RELEASE ORAL at 08:50

## 2025-07-23 RX ADMIN — LEVOFLOXACIN 750 MG: 750 TABLET, FILM COATED ORAL at 12:37

## 2025-07-23 RX ADMIN — HYDROMORPHONE HYDROCHLORIDE 5 MG: 2 INJECTION, SOLUTION INTRAMUSCULAR; INTRAVENOUS; SUBCUTANEOUS at 08:58

## 2025-07-23 RX ADMIN — DEXTROSE AND SODIUM CHLORIDE 75 ML/HR: 5; 450 INJECTION, SOLUTION INTRAVENOUS at 08:52

## 2025-07-23 RX ADMIN — HYDROMORPHONE HYDROCHLORIDE 5 MG: 2 INJECTION, SOLUTION INTRAMUSCULAR; INTRAVENOUS; SUBCUTANEOUS at 02:40

## 2025-07-23 RX ADMIN — OXYCODONE HYDROCHLORIDE 15 MG: 10 TABLET ORAL at 00:24

## 2025-07-23 RX ADMIN — HYDROMORPHONE HYDROCHLORIDE 5 MG: 2 INJECTION, SOLUTION INTRAMUSCULAR; INTRAVENOUS; SUBCUTANEOUS at 12:36

## 2025-07-23 RX ADMIN — HYDROMORPHONE HYDROCHLORIDE 5 MG: 2 INJECTION, SOLUTION INTRAMUSCULAR; INTRAVENOUS; SUBCUTANEOUS at 23:16

## 2025-07-23 RX ADMIN — HYDROMORPHONE HYDROCHLORIDE 5 MG: 2 INJECTION, SOLUTION INTRAMUSCULAR; INTRAVENOUS; SUBCUTANEOUS at 20:31

## 2025-07-23 RX ADMIN — HYDROMORPHONE HYDROCHLORIDE 5 MG: 2 INJECTION, SOLUTION INTRAMUSCULAR; INTRAVENOUS; SUBCUTANEOUS at 06:41

## 2025-07-23 RX ADMIN — PANTOPRAZOLE SODIUM 40 MG: 40 TABLET, DELAYED RELEASE ORAL at 06:41

## 2025-07-23 RX ADMIN — HYDROMORPHONE HYDROCHLORIDE 5 MG: 2 INJECTION, SOLUTION INTRAMUSCULAR; INTRAVENOUS; SUBCUTANEOUS at 14:54

## 2025-07-23 RX ADMIN — DEXTROSE AND SODIUM CHLORIDE 75 ML/HR: 5; .45 INJECTION, SOLUTION INTRAVENOUS at 00:28

## 2025-07-23 RX ADMIN — SENNOSIDES AND DOCUSATE SODIUM 1 TABLET: 50; 8.6 TABLET ORAL at 08:49

## 2025-07-23 RX ADMIN — OXYCODONE HYDROCHLORIDE 15 MG: 10 TABLET ORAL at 04:42

## 2025-07-23 ASSESSMENT — PAIN SCALES - GENERAL
PAINLEVEL_OUTOF10: 7
PAINLEVEL_OUTOF10: 8
PAINLEVEL_OUTOF10: 9
PAINLEVEL_OUTOF10: 7
PAINLEVEL_OUTOF10: 7
PAINLEVEL_OUTOF10: 8
PAINLEVEL_OUTOF10: 8
PAINLEVEL_OUTOF10: 7
PAINLEVEL_OUTOF10: 7
PAINLEVEL_OUTOF10: 8
PAINLEVEL_OUTOF10: 9
PAINLEVEL_OUTOF10: 9
PAINLEVEL_OUTOF10: 6
PAINLEVEL_OUTOF10: 8
PAINLEVEL_OUTOF10: 7
PAINLEVEL_OUTOF10: 8
PAINLEVEL_OUTOF10: 8

## 2025-07-23 ASSESSMENT — COGNITIVE AND FUNCTIONAL STATUS - GENERAL
MOBILITY SCORE: 24
DAILY ACTIVITIY SCORE: 24

## 2025-07-23 ASSESSMENT — PAIN DESCRIPTION - LOCATION
LOCATION: BACK

## 2025-07-23 NOTE — CARE PLAN
The patient's goals for the shift include      The clinical goals for the shift include pt will rate pain <7

## 2025-07-23 NOTE — PROGRESS NOTES
Joellen Narayan is a 24 y.o. male on day 9 of admission presenting with Sickle cell pain crisis (Multi).    Subjective   Patient seen resting in bed. Increased pain today, specifically in her back. Continues to have N/V with about 5 bouts of emesis in the past 24 hours, only used 1x dose of IVP compazine. Vomiting typically occurs after eating. Last bowel movement on 7/22. Continues to deny any abdominal pain. We discussed bowel rest today with IVF resuscitation and checking for viral illnesses. Will revert back to IVP dilaudid only today due to poor tolerance of PO intake. We discussed that N/V is likely related to his gallbladder, patient would like to continue conservative approach with symptom management at this time, agreeable to discuss with gen surg on 7/24 if symptoms persist          Objective     Physical Exam  Constitutional:       Appearance: Normal appearance.   HENT:      Head: Normocephalic.      Mouth/Throat:      Mouth: Mucous membranes are moist.     Eyes:      General: Scleral icterus present.      Pupils: Pupils are equal, round, and reactive to light.       Cardiovascular:      Rate and Rhythm: Normal rate and regular rhythm.      Pulses: Normal pulses.      Heart sounds: Normal heart sounds.   Pulmonary:      Effort: Pulmonary effort is normal.      Breath sounds: Normal breath sounds.   Abdominal:      General: Abdomen is flat. There is no distension.      Palpations: Abdomen is soft.      Tenderness: There is no abdominal tenderness. There is no guarding.      Comments: Negative murpheys sign      Musculoskeletal:         General: Normal range of motion.      Cervical back: Normal range of motion and neck supple.     Skin:     General: Skin is warm.     Neurological:      General: No focal deficit present.      Mental Status: He is alert.     Psychiatric:         Mood and Affect: Mood normal.         Last Recorded Vitals  Blood pressure 127/73, pulse 72, temperature 36.5 °C (97.7 °F),  "temperature source Temporal, resp. rate 14, height 1.88 m (6' 2.02\"), weight 70.3 kg (155 lb), SpO2 96%.  Intake/Output last 3 Shifts:  I/O last 3 completed shifts:  In: 2850 (40.5 mL/kg) [P.O.:1850; I.V.:1000 (14.2 mL/kg)]  Out: 2030 (28.9 mL/kg) [Urine:2030 (1.2 mL/kg/hr)]  Weight: 70.3 kg     Relevant Results                 Scheduled medications  Scheduled Medications[1]  Continuous medications  Continuous Medications[2]  PRN medications  PRN Medications[3]    Results for orders placed or performed during the hospital encounter of 07/14/25 (from the past 24 hours)   Comprehensive metabolic panel   Result Value Ref Range    Glucose 105 (H) 74 - 99 mg/dL    Sodium 135 (L) 136 - 145 mmol/L    Potassium 4.0 3.5 - 5.3 mmol/L    Chloride 99 98 - 107 mmol/L    Bicarbonate 25 21 - 32 mmol/L    Anion Gap 15 10 - 20 mmol/L    Urea Nitrogen 7 6 - 23 mg/dL    Creatinine 0.55 0.50 - 1.30 mg/dL    eGFR >90 >60 mL/min/1.73m*2    Calcium 9.8 8.6 - 10.6 mg/dL    Albumin 4.3 3.4 - 5.0 g/dL    Alkaline Phosphatase 137 (H) 33 - 120 U/L    Total Protein 7.2 6.4 - 8.2 g/dL    AST 73 (H) 9 - 39 U/L    Bilirubin, Total 15.5 (H) 0.0 - 1.2 mg/dL    ALT 53 (H) 10 - 52 U/L   CBC and Auto Differential   Result Value Ref Range    WBC 12.0 (H) 4.4 - 11.3 x10*3/uL    nRBC 2.7 (H) 0.0 - 0.0 /100 WBCs    RBC 2.97 (L) 4.50 - 5.90 x10*6/uL    Hemoglobin 9.0 (L) 13.5 - 17.5 g/dL    Hematocrit 26.2 (L) 41.0 - 52.0 %    MCV 88 80 - 100 fL    MCH 30.3 26.0 - 34.0 pg    MCHC 34.4 32.0 - 36.0 g/dL    RDW 20.9 (H) 11.5 - 14.5 %    Platelets 378 150 - 450 x10*3/uL    Neutrophils % 59.6 40.0 - 80.0 %    Immature Granulocytes %, Automated 1.7 (H) 0.0 - 0.9 %    Lymphocytes % 20.2 13.0 - 44.0 %    Monocytes % 11.9 2.0 - 10.0 %    Eosinophils % 6.2 0.0 - 6.0 %    Basophils % 0.4 0.0 - 2.0 %    Neutrophils Absolute 7.17 1.20 - 7.70 x10*3/uL    Immature Granulocytes Absolute, Automated 0.20 0.00 - 0.70 x10*3/uL    Lymphocytes Absolute 2.43 1.20 - 4.80 x10*3/uL "    Monocytes Absolute 1.43 (H) 0.10 - 1.00 x10*3/uL    Eosinophils Absolute 0.74 (H) 0.00 - 0.70 x10*3/uL    Basophils Absolute 0.05 0.00 - 0.10 x10*3/uL   Lactate Dehydrogenase   Result Value Ref Range     (H) 84 - 246 U/L   Reticulocytes   Result Value Ref Range    Retic % 21.0 (H) 0.5 - 2.0 %    Retic Absolute 0.623 (H) 0.022 - 0.118 x10*6/uL    Reticulocyte Hemoglobin 29 28 - 38 pg    Immature Retic fraction 24.4 (H) <=16.0 %     *Note: Due to a large number of results and/or encounters for the requested time period, some results have not been displayed. A complete set of results can be found in Results Review.                  Assessment & Plan  Sickle cell pain crisis (Multi)    Joellen Narayan is a 24 y.o. 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 Cxr w/o evidence of acute process 7/14 and 7/15. Hemolysis labs significantly elevated 7/15 and pain worsening reflecting vaso-occlusive crisis. CT Spine (7/15) w/o acute process. S/p 1 unit pRBCs on 7/15 with poor incrementation in hgb, bili increased to 27 (7/16). CT PE (7/16) negative for acute PE, does show increase in left basilar opacities concerning for pneumonia, started on IV Levaquin (7/16-7/23). Dr. Cruz recommends RBC exchange, however patient refusing at this time. Bilirubin continuing to increase to 37.3 on 7/17 --> 40.9 on 7/18 -->39.7 on 7/19, Tbili remains downtrending as of 7/20. On 7/18, Dr. Cruz continues to recommend RBCex, however, patient continues to refuse RBC exchange. Hgb 6.9 (7/18), 1 unit  pRBC ordered per Dr. Cruz with appropriate incrementation of hemoglobin on 7/19. GI consulted 7/17 given increasing hyperbilirubinemia, recs consulting general surgery for possible cholecystectomy. General surgery consulted 7/17, discussed cholecystectomy with patient, patient declining at this time, no acute interventions, patient to follow-up with general surgery outpatient. Patient with increased N/V on 7/22 and 7/23, plan for IVF hydration and IV antiemetics. Influenza A+B and COVID and KUB ordered and pending. DC home resumed nox O2 pending improvement in pain and acute sickle cell crisis and N/V.      Updates 7/23:  - Continued N/V today. Continue D5 1/2 @75mL/hour and encourage bowel rest and advance diet as tolerated   - Encouraged used of IV antiemetics (only used 1x dose of IVP compazine in past 24 hours) and ordered scopalamine patch today per attending   - Revert back to IVP dilaudid 5mg q2 hours from PO / IV rotating regimen as patient tolerating po intake   - COVID, influenza A+B pending   - KUB ordered and pending   - Discussed with patient that gallbladder likely contributing to N/V, patient would like to continue conservative approach with symptom management at this time, agreeable to discuss with gen surg on 7/24 if symptoms persist     # Hx choledocholithiasis 7/2024  # hyperbilirubinemia   # N/V   - Worsening hemolysis in setting of vaso-occlusive crisis could be contributing to increased hyperbilirubinemia    - Increased N/V on 7/22   - no abdominal pain, afebrile, leukocytosis increased from baseline   - July 2024 s/p ERCP with biliary sphincterotomy where sludge and stones were removed, achieving complete clearance    - 1/31/25 was planned for cholecystectomy with Dr. Dove but no show on day of surgery and again 2/13 and 2/27    - Tbili 10.4 (7/14) BL ~ 8-10;->27 (7/16)->37.3 (7/17)->40.9 (7/18) --> 39 (7/19); Tbili continues to downtrend as of 7/23   - S/p D5 1/2NS @75ml/h x24h (7/15),  increased to 100 mL/hr (7/16), 75 mL/hr x 1 day (7/17)  - Continue with D5 1/2 @ 75mL/hour (7/22-current) in setting of decreased po intake   - RUQ ultrasound (7/17) shows hepatomegaly, small volume biliary sludge and gallstones without evidence of cholecystitis or biliary dilation.  - GI consulted (7/17), rec consulting general surgery; hyperbilirubinemia likely 2/2 hemolysis in setting of acute sickle cell crisis  - ACS consulted (7/17) for possible cholecystectomy, discussed gallbladder removal with patient, patient declining at this time, no acute interventions recommended  - Patient has FUV with gen surg on 7/31   - N/V symptomatic control with IV compazine 10mg q6 hours (first line-patient prefers) and IVP zofran 4mg q6 hours PRN (second line), scopamine patch transdermal patch ordered 7/23 per attending   - COVID, influenza A+B pending   - KUB ordered and pending   - Discussed with patient that gallbladder likely contributing to N/V, patient would like to continue conservative approach with symptom management at this time, agreeable to discuss with gen surg on 7/24 if symptoms persist      # Hgb SS disease acute on chronic pain   # C/f acute chest  # pneumonia  - [place] reviewed on admission, no aberrancies noted (last filled 5/22 oxy ER qty 28 tabs, qty 14 days, and 5/16 oxy IR qty 28 tabs 7 days)  - pt reports he has no home oxycodone at home and has been calling refill line and pharmacy without success or hearing back   - No carepath available   - Primary hematologist: Dr. Cruz - updated 7/16, 7/17, 7/18, recommends RBC exchange transfusion, however patient refusing on 7/16, 7/17, and 7/18, does not want apheresis line placed   - CXR (7/14 and 7/15) w/o evidence of acute process; Repeat CXR (7/18) small retrocardiac opacity favored to represent atelectasis v. Underlying infection   - CT PE (7/16) negative for acute PE, does show increasing left basilar opacities concerning for pneumonia  - CT Spine (7/15)  w/o acute process   - EKG in ED showed sinus rhythm, no ST elevation or ischemic changes, trop pending   - Hg 7.8 (7/15), (baseline ~7-8); s/p 1 unit pRBC 7/15->8.2 (7/16)-> 7.1 (7/17)->6.9 (7/18); s/p 1 unit pRBCs 7/18, Hgb 9.0 (7/23)   - Tbili 10.4 (7/14) BL ~ 8-10;->27 (7/16)->37.3 (7/17)->40.9 (7/18) --> 39 (7/19); Tbili continues to downtrend as of 7/23   -  (7/14)->846 (7/16)->709 (7/17)->626 (7/18) -> 508 (7/23)  - WBC 15.5 (7/16)-> 10.7 (7/17)->9.8 (7/18) --> 12.0 (7/23)  - Hg S 75% (7/14); repeat 56.6% on 7/19 ordered per Dr. Cruz   - utox + MJ (7/14)   - S/p IV levaquin (7/16-7/23)   - s/p 4mg IVP dilaudid q2 PRN severe pain (7/15- 7/16)  - s/p IVP dilaudid 5mg q2 hours PRN severe pain (7/15-7/21)   - s/p rotating IVP dilaudid 5mg q4 hours rotating q2 hours with PO oxycodone 15mg q4 hours (7/21-7/23)    - Revert back to IVP dilaudid 5mg q2 hours from PO / IV rotating regimen as patient tolerating po intake (7/23--)  - s/p toradol 30mg q6 sched x3 days with PPI support   - re-started home oxycontin ER 30 mg BID on admit   - Continue lidocaine patches  - c/w home folic acid 1mg daily   - supportive care: lidocaine patches, hot packs   - bowel regimen for opioid induced constipation, zofran for opioid induced nausea, and benadryl for opioid induced pruritis      # Hx recent Septic Arthritis R elbow   - Ortho surgeon: Dr. Tello   - 5/9 MRI w/anesthesia R radius/humerus showing concern for septic arthritis, osteomyelitis, myositis, severe muscle and subcutaneous soft tissue edema, and possible cellulitis   - 5/10 OR s/p R elbow I&D with ortho    - s/p IV daptomycin 6mg/kg q24h and ertapenem 1g q24h until 6/20/25 per ID  - 6/9 Most recent Ortho visit, sutures removed, cont to work on ROM, pt declining PT, fuv in 6-8 weeks for xrays of R elbow, upcoming 8/18      # Hx of nocturnal oxygen dependence   - On 2-3L at night, however patient reports daytime hypoxia as well in the past, currently on 2L      #  Hx Priapism   - No active issues during current admission   - Hx previously drained by urology    - Continue home Sudafed 30mg daily PRN priapism     # Nodular lymphocyte predominant Hodgkins lymphoma (NLPHL)     - Follows with Dr. Stoll   - s/p Rituxan and prednisone q3 weeks (C1 1/18/24, C2 2/8/24, Missed C3 d/t sickle cell pain crisis, C4 4/4/24, C5 5/16/24, C6 6/7/24)    - 3/13 No show to onc appt    - 4/9 PET showed interval development of new FDG focus in mid abdomen c/w recurrence   - 6/26 OP with Dr. Stoll, discussed with patient and his mother re; treatment options (including Rituxan maintenance) vs observation, pt deciding options   - PET on 10/21 and FUV with Dr. Stoll on 11/6      # Prophy   - s/p x6 weeks ASA 81mg BID postop, stopped 6/21  - lovenox   - SCDs, encouraged ambulation       # dispo   - Full code  - DC home resumed noc O2 pending improvement in pain, N/V  - FUV 7/28 sickle cell, 8/18 Pulm, 8/18 Ortho, PET 10/21, 11/6 Dr. Stoll      I spent 60 minutes in the professional and overall care of this patient.    Assessment and plan discussed with attending, Dr. Soheila Stoll, PA-C           [1] enoxaparin, 40 mg, subcutaneous, Daily  folic acid, 1 mg, oral, Daily  levoFLOXacin, 750 mg, oral, q24h  lidocaine, 5 mL, infiltration, Once  lidocaine, 2 patch, transdermal, Daily  oxyCODONE ER, 30 mg, oral, q12h REYNALDO  oxygen, , inhalation, Continuous - Inhalation  pantoprazole, 40 mg, oral, Daily before breakfast  polyethylene glycol, 17 g, oral, Daily  sennosides-docusate sodium, 1 tablet, oral, BID  [2] dextrose 5%-0.45 % sodium chloride, 75 mL/hr, Last Rate: 75 mL/hr (07/23/25 0838)  [3] PRN medications: diphenhydrAMINE, HYDROmorphone, ondansetron, [Held by provider] ondansetron, prochlorperazine, sodium chloride

## 2025-07-23 NOTE — CARE PLAN
The patient's goals for the shift include      The clinical goals for the shift include Pt will report ;pain between 7-8/10 this shift ending 7/3/25 @0730      Problem: Pain - Adult  Goal: Verbalizes/displays adequate comfort level or baseline comfort level  Outcome: Progressing     Problem: Safety - Adult  Goal: Free from fall injury  Outcome: Progressing     Problem: Discharge Planning  Goal: Discharge to home or other facility with appropriate resources  Outcome: Progressing     Problem: Chronic Conditions and Co-morbidities  Goal: Patient's chronic conditions and co-morbidity symptoms are monitored and maintained or improved  Outcome: Progressing     Problem: Nutrition  Goal: Nutrient intake appropriate for maintaining nutritional needs  Outcome: Progressing     Problem: Pain  Goal: Takes deep breaths with improved pain control throughout the shift  Outcome: Progressing  Goal: Turns in bed with improved pain control throughout the shift  Outcome: Progressing  Goal: Walks with improved pain control throughout the shift  Outcome: Progressing  Goal: Performs ADL's with improved pain control throughout shift  Outcome: Progressing  Goal: Participates in PT with improved pain control throughout the shift  Outcome: Progressing  Goal: Free from opioid side effects throughout the shift  Outcome: Progressing  Goal: Free from acute confusion related to pain meds throughout the shift  Outcome: Progressing

## 2025-07-24 ENCOUNTER — PHARMACY VISIT (OUTPATIENT)
Dept: PHARMACY | Facility: CLINIC | Age: 25
End: 2025-07-24
Payer: MEDICARE

## 2025-07-24 VITALS
WEIGHT: 155 LBS | TEMPERATURE: 98.2 F | HEART RATE: 74 BPM | BODY MASS INDEX: 19.89 KG/M2 | OXYGEN SATURATION: 96 % | HEIGHT: 74 IN | SYSTOLIC BLOOD PRESSURE: 129 MMHG | RESPIRATION RATE: 16 BRPM | DIASTOLIC BLOOD PRESSURE: 78 MMHG

## 2025-07-24 PROBLEM — D57.00 SICKLE CELL PAIN CRISIS (MULTI): Status: RESOLVED | Noted: 2025-05-03 | Resolved: 2025-07-24

## 2025-07-24 LAB
ALBUMIN SERPL BCP-MCNC: 4.5 G/DL (ref 3.4–5)
ALP SERPL-CCNC: 151 U/L (ref 33–120)
ALT SERPL W P-5'-P-CCNC: 64 U/L (ref 10–52)
ANION GAP SERPL CALC-SCNC: 12 MMOL/L (ref 10–20)
AST SERPL W P-5'-P-CCNC: 81 U/L (ref 9–39)
BASOPHILS # BLD AUTO: 0.06 X10*3/UL (ref 0–0.1)
BASOPHILS NFR BLD AUTO: 0.6 %
BILIRUB SERPL-MCNC: 14 MG/DL (ref 0–1.2)
BUN SERPL-MCNC: 6 MG/DL (ref 6–23)
CALCIUM SERPL-MCNC: 9.8 MG/DL (ref 8.6–10.6)
CHLORIDE SERPL-SCNC: 97 MMOL/L (ref 98–107)
CO2 SERPL-SCNC: 30 MMOL/L (ref 21–32)
CREAT SERPL-MCNC: 0.57 MG/DL (ref 0.5–1.3)
EGFRCR SERPLBLD CKD-EPI 2021: >90 ML/MIN/1.73M*2
EOSINOPHIL # BLD AUTO: 0.99 X10*3/UL (ref 0–0.7)
EOSINOPHIL NFR BLD AUTO: 9.1 %
ERYTHROCYTE [DISTWIDTH] IN BLOOD BY AUTOMATED COUNT: 21.8 % (ref 11.5–14.5)
GLUCOSE SERPL-MCNC: 82 MG/DL (ref 74–99)
HCT VFR BLD AUTO: 27.8 % (ref 41–52)
HGB BLD-MCNC: 9.7 G/DL (ref 13.5–17.5)
HGB RETIC QN: 32 PG (ref 28–38)
IMM GRANULOCYTES # BLD AUTO: 0.17 X10*3/UL (ref 0–0.7)
IMM GRANULOCYTES NFR BLD AUTO: 1.6 % (ref 0–0.9)
IMMATURE RETIC FRACTION: 31.2 %
LDH SERPL L TO P-CCNC: 514 U/L (ref 84–246)
LYMPHOCYTES # BLD AUTO: 2.33 X10*3/UL (ref 1.2–4.8)
LYMPHOCYTES NFR BLD AUTO: 21.4 %
MCH RBC QN AUTO: 30.4 PG (ref 26–34)
MCHC RBC AUTO-ENTMCNC: 34.9 G/DL (ref 32–36)
MCV RBC AUTO: 87 FL (ref 80–100)
MONOCYTES # BLD AUTO: 1.41 X10*3/UL (ref 0.1–1)
MONOCYTES NFR BLD AUTO: 13 %
NEUTROPHILS # BLD AUTO: 5.92 X10*3/UL (ref 1.2–7.7)
NEUTROPHILS NFR BLD AUTO: 54.3 %
NRBC BLD-RTO: 4.5 /100 WBCS (ref 0–0)
PLATELET # BLD AUTO: 414 X10*3/UL (ref 150–450)
POTASSIUM SERPL-SCNC: 4 MMOL/L (ref 3.5–5.3)
PROT SERPL-MCNC: 7.5 G/DL (ref 6.4–8.2)
RBC # BLD AUTO: 3.19 X10*6/UL (ref 4.5–5.9)
RETICS #: 0.74 X10*6/UL (ref 0.02–0.12)
RETICS/RBC NFR AUTO: 23.4 % (ref 0.5–2)
SODIUM SERPL-SCNC: 135 MMOL/L (ref 136–145)
WBC # BLD AUTO: 10.9 X10*3/UL (ref 4.4–11.3)

## 2025-07-24 PROCEDURE — 2500000004 HC RX 250 GENERAL PHARMACY W/ HCPCS (ALT 636 FOR OP/ED): Performed by: NURSE PRACTITIONER

## 2025-07-24 PROCEDURE — 2500000004 HC RX 250 GENERAL PHARMACY W/ HCPCS (ALT 636 FOR OP/ED): Mod: TB

## 2025-07-24 PROCEDURE — 85045 AUTOMATED RETICULOCYTE COUNT: CPT

## 2025-07-24 PROCEDURE — 83615 LACTATE (LD) (LDH) ENZYME: CPT

## 2025-07-24 PROCEDURE — 2500000001 HC RX 250 WO HCPCS SELF ADMINISTERED DRUGS (ALT 637 FOR MEDICARE OP)

## 2025-07-24 PROCEDURE — 36415 COLL VENOUS BLD VENIPUNCTURE: CPT

## 2025-07-24 PROCEDURE — 99239 HOSP IP/OBS DSCHRG MGMT >30: CPT | Performed by: NURSE PRACTITIONER

## 2025-07-24 PROCEDURE — 85025 COMPLETE CBC W/AUTO DIFF WBC: CPT

## 2025-07-24 PROCEDURE — 80053 COMPREHEN METABOLIC PANEL: CPT

## 2025-07-24 PROCEDURE — RXMED WILLOW AMBULATORY MEDICATION CHARGE

## 2025-07-24 RX ORDER — SCOPOLAMINE 1 MG/3D
1 PATCH, EXTENDED RELEASE TRANSDERMAL
Qty: 10 PATCH | Refills: 0 | Status: SHIPPED | OUTPATIENT
Start: 2025-07-26

## 2025-07-24 RX ORDER — SCOPOLAMINE 1 MG/3D
1 PATCH, EXTENDED RELEASE TRANSDERMAL ONCE
Status: DISCONTINUED | OUTPATIENT
Start: 2025-07-24 | End: 2025-07-24 | Stop reason: HOSPADM

## 2025-07-24 RX ORDER — FOLIC ACID 1 MG/1
1 TABLET ORAL DAILY
Qty: 90 TABLET | Refills: 0 | Status: SHIPPED | OUTPATIENT
Start: 2025-07-24 | End: 2026-07-24

## 2025-07-24 RX ADMIN — HYDROMORPHONE HYDROCHLORIDE 5 MG: 2 INJECTION, SOLUTION INTRAMUSCULAR; INTRAVENOUS; SUBCUTANEOUS at 03:43

## 2025-07-24 RX ADMIN — FOLIC ACID 1 MG: 1 TABLET ORAL at 09:22

## 2025-07-24 RX ADMIN — HYDROMORPHONE HYDROCHLORIDE 5 MG: 2 INJECTION, SOLUTION INTRAMUSCULAR; INTRAVENOUS; SUBCUTANEOUS at 01:25

## 2025-07-24 RX ADMIN — PANTOPRAZOLE SODIUM 40 MG: 40 TABLET, DELAYED RELEASE ORAL at 05:54

## 2025-07-24 RX ADMIN — SCOPOLAMINE 1 PATCH: 1.5 PATCH, EXTENDED RELEASE TRANSDERMAL at 09:22

## 2025-07-24 RX ADMIN — HYDROMORPHONE HYDROCHLORIDE 5 MG: 2 INJECTION, SOLUTION INTRAMUSCULAR; INTRAVENOUS; SUBCUTANEOUS at 05:53

## 2025-07-24 RX ADMIN — OXYCODONE HYDROCHLORIDE 30 MG: 20 TABLET, FILM COATED, EXTENDED RELEASE ORAL at 09:23

## 2025-07-24 ASSESSMENT — PAIN SCALES - GENERAL
PAINLEVEL_OUTOF10: 7
PAINLEVEL_OUTOF10: 8
PAINLEVEL_OUTOF10: 8
PAINLEVEL_OUTOF10: 9

## 2025-07-24 ASSESSMENT — COGNITIVE AND FUNCTIONAL STATUS - GENERAL
DAILY ACTIVITIY SCORE: 24
MOBILITY SCORE: 23
CLIMB 3 TO 5 STEPS WITH RAILING: A LITTLE

## 2025-07-24 ASSESSMENT — PAIN - FUNCTIONAL ASSESSMENT
PAIN_FUNCTIONAL_ASSESSMENT: 0-10

## 2025-07-24 NOTE — CARE PLAN
Problem: Pain - Adult  Goal: Verbalizes/displays adequate comfort level or baseline comfort level  Outcome: Progressing     Problem: Safety - Adult  Goal: Free from fall injury  Outcome: Progressing     Problem: Discharge Planning  Goal: Discharge to home or other facility with appropriate resources  Outcome: Progressing     Problem: Chronic Conditions and Co-morbidities  Goal: Patient's chronic conditions and co-morbidity symptoms are monitored and maintained or improved  Outcome: Progressing     Problem: Nutrition  Goal: Nutrient intake appropriate for maintaining nutritional needs  Outcome: Progressing     Problem: Pain  Goal: Takes deep breaths with improved pain control throughout the shift  Outcome: Progressing  Goal: Turns in bed with improved pain control throughout the shift  Outcome: Progressing  Goal: Walks with improved pain control throughout the shift  Outcome: Progressing  Goal: Performs ADL's with improved pain control throughout shift  Outcome: Progressing  Goal: Participates in PT with improved pain control throughout the shift  Outcome: Progressing  Goal: Free from opioid side effects throughout the shift  Outcome: Progressing  Goal: Free from acute confusion related to pain meds throughout the shift  Outcome: Progressing       The clinical goals for the shift include pt will rate pain below 7/10 throughout shift

## 2025-07-24 NOTE — CARE PLAN
The patient's goals for the shift include      The clinical goals for the shift include pt will rate pain below 7/10 throughout shift    \

## 2025-07-24 NOTE — DISCHARGE SUMMARY
Discharge Diagnosis  Sickle cell pain crisis (Multi)    Issues Requiring Follow-Up  Gen surg FUV/Shayy plan    Test Results Pending At Discharge  Pending Labs       Order Current Status    CBC and Auto Differential Collected (07/24/25 0819)    Comprehensive metabolic panel Collected (07/24/25 0818)    Lactate Dehydrogenase Collected (07/24/25 0818)    Reticulocytes Collected (07/24/25 0819)          Hospital Course  Joellen Narayan is a 24 y.o. 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 Cxr w/o evidence of acute process 7/14 and 7/15. Hemolysis labs significantly elevated 7/15 and pain worsening reflecting vaso-occlusive crisis. CT Spine (7/15) w/o acute process. S/p 1 unit pRBCs on 7/15 with poor incrementation in hgb, bili increased to 27 (7/16). CT PE (7/16) negative for acute PE, does show increase in left basilar opacities concerning for pneumonia, started on IV Levaquin (7/16-7/23). Dr. Cruz recommends RBC exchange, however patient refusing at this time. Bilirubin continuing to increase to 37.3 on 7/17 --> 40.9 on 7/18 -->39.7 on 7/19, Tbili remains downtrending as of 7/20-current. On 7/18, Dr. Cruz continues to recommend RBCex, however, patient continues to refuse RBC exchange. Hgb 6.9 (7/18), 1 unit pRBC ordered per Dr. Cruz with appropriate incrementation of hemoglobin on 7/19. GI consulted 7/17 given increasing hyperbilirubinemia, recs consulting general surgery for possible cholecystectomy. General surgery consulted 7/17, discussed cholecystectomy with patient,  patient declining at this time, no acute interventions, patient to follow-up with general surgery outpatient. Patient with increased N/V on 7/22 and 7/23, given IVF hydration and IV antiemetics. Influenza A+B and COVID negative. KUB (7/23) negative. 7/24 pt states he feels well, seen eating breakfast. States that nausea is ok and pain is controlled. He requested discharge. He was dc in stable condition with resumed home noc O2.     Rx for folic acid, scopolamine patch, oxycodone and oxycontin sent to [facility] and delivered to bedside prior to dc.    Follow up apts as below      Visit Vitals  /81 (BP Location: Right arm, Patient Position: Lying)   Pulse 89   Temp 37.2 °C (99 °F) (Temporal)   Resp 16     Vitals:    07/14/25 1840   Weight: 70.3 kg (155 lb)       Immunization History   Administered Date(s) Administered    Hep A / Hep B 09/12/2009    Hepatitis A vaccine, pediatric/adolescent (HAVRIX, VAQTA) 09/12/2009    Meningococcal ACWY-D (Menactra) 4-valent conjugate vaccine 09/24/2018    Meningococcal B vaccine (BEXSERO) 09/24/2018, 03/20/2019    Meningococcal MPSV4 09/05/2002    Meningococcal, Unknown Serogroups 09/05/2002    Pneumococcal conjugate vaccine, 13-valent (PREVNAR 13) 01/31/2013    Pneumococcal polysaccharide vaccine, 23-valent, age 2 years and older (PNEUMOVAX 23) 09/05/2002       Results  Results for orders placed or performed during the hospital encounter of 07/14/25 (from the past 24 hours)   Sars-CoV-2 and Influenza A/B PCR   Result Value Ref Range    Flu A Result Not Detected Not Detected    Flu B Result Not Detected Not Detected    Coronavirus 2019, PCR Not Detected Not Detected     *Note: Due to a large number of results and/or encounters for the requested time period, some results have not been displayed. A complete set of results can be found in Results Review.       Pertinent Physical Exam At Time of Discharge  On the day of discharge, the patient reported feeling well and pain was  controlled. Vitals and labs were stable.   On exam:  Constitutional: Awake, NAD, +scleral icterus  ENMT: mucous membranes moist, no apparent injury, no lesions seen  Head/Neck: NCAT  Respiratory/Thorax: CTAB, no wheezing  Cardiovascular: RRR, S1+S2, no murmur  Gastrointestinal: Soft, NT, nondistended, +BS  Extremities: No LE edema  Psychological: Appropriate mood and behavior  Skin: No rash noted      Home Medications     Medication List      START taking these medications     * oxyCODONE 15 mg immediate release tablet; Commonly known as:   Roxicodone; Take 1 tablet (15 mg) by mouth every 4 hours if needed for   moderate pain (4 - 6) for up to 7 days.   * oxyCODONE ER 30 mg 12 hr tablet; Commonly known as: OxyCONTIN; Take 1   tablet (30 mg) by mouth every 12 hours for 14 days. Do not crush, chew, or   split.   scopolamine 1 mg over 3 days patch 3 day; Commonly known as:   Transderm-Scop; Place 1 patch over 72 hours on the skin every 3rd day.;   Start taking on: July 26, 2025  * This list has 2 medication(s) that are the same as other medications   prescribed for you. Read the directions carefully, and ask your doctor or   other care provider to review them with you.     CONTINUE taking these medications     folic acid 1 mg tablet; Commonly known as: Folvite; Take 1 tablet (1 mg)   by mouth once daily.   oxygen gas therapy; Commonly known as: O2     STOP taking these medications     aspirin 81 mg chewable tablet   pseudoephedrine 60 mg tablet; Commonly known as: Sudogest       Outpatient Follow-Up  Future Appointments   Date Time Provider Department Center   7/28/2025  1:00 PM Homa Dickinson, APRN-CNP [id number] [facility]   7/31/2025 12:00 PM Elias Dove MD [id number] [facility]   8/18/2025  1:40 PM Pillo Tello MD [id number] [facility]   8/18/2025  3:30 PM Vannesa Arango MD [id number] [facility]   10/21/2025  9:00 AM [facility] ADMIN ROOM PET CT [facility]   10/21/2025 10:00 AM [facility] PET CT 2  [facility]   10/30/2025 11:00 AM Homa Dickinson APRN-CNP [id number] Academic   11/6/2025  4:00 PM Melissa Stoll MD [id number] [facility]     >30 minutes was spent on the assessment/discharge plan of this patient    Assessment and plan as above discussed with attending physician Dr. Alex Jenkins, APRN-CNP

## 2025-07-31 ENCOUNTER — APPOINTMENT (OUTPATIENT)
Dept: SURGERY | Facility: CLINIC | Age: 25
End: 2025-07-31
Payer: COMMERCIAL

## 2025-07-31 VITALS
RESPIRATION RATE: 20 BRPM | SYSTOLIC BLOOD PRESSURE: 121 MMHG | TEMPERATURE: 97.9 F | DIASTOLIC BLOOD PRESSURE: 80 MMHG | HEART RATE: 71 BPM | BODY MASS INDEX: 19.34 KG/M2 | WEIGHT: 150.7 LBS

## 2025-07-31 DIAGNOSIS — K80.50 CHOLEDOCHOLITHIASIS: Primary | ICD-10-CM

## 2025-07-31 PROCEDURE — 99213 OFFICE O/P EST LOW 20 MIN: CPT | Performed by: SURGERY

## 2025-07-31 ASSESSMENT — ENCOUNTER SYMPTOMS
ABDOMINAL PAIN: 1
HEMATOLOGIC/LYMPHATIC NEGATIVE: 1
APPETITE CHANGE: 1
NAUSEA: 1
MYALGIAS: 1
ENDOCRINE NEGATIVE: 1
ACTIVITY CHANGE: 1
ARTHRALGIAS: 1
JOINT SWELLING: 1
NEUROLOGICAL NEGATIVE: 1

## 2025-07-31 ASSESSMENT — PAIN SCALES - GENERAL: PAINLEVEL_OUTOF10: 6

## 2025-07-31 NOTE — PROGRESS NOTES
Subjective   Patient ID: Joellen Narayan is a 24 y.o. male who presents for discussion of management of gallstone disease and jaundice.  Patient is a 24-year-old male with sickle cell disease who has been seen several times previously for gallstones, right upper quadrant pain, and abnormal liver function tests.  He was recently admitted with significant elevation of his liver function tests and hyperbilirubinemia secondary to sickle cell disease.  Presently, he reports intermittent episodes of postprandial right upper quadrant pain and continued dark urine.  His most recent bilirubin is 13.  His ultrasound exam did show no intrahepatic biliary dilation and a common bile duct of 5 mm.  It is suspected that his hyperbilirubinemia is most likely secondary to hemolysis.  He had been scheduled twice previously for laparoscopic cholecystectomy but had to be canceled due to sickle cell crisis episodes.        Review of Systems   Constitutional:  Positive for activity change and appetite change.   HENT: Negative.     Gastrointestinal:  Positive for abdominal pain and nausea.   Endocrine: Negative.    Genitourinary: Negative.    Musculoskeletal:  Positive for arthralgias, joint swelling and myalgias.   Neurological: Negative.    Hematological: Negative.        Objective   Physical Exam  Constitutional:       Appearance: Normal appearance. He is not ill-appearing.     Eyes:      General: Scleral icterus present.       Cardiovascular:      Rate and Rhythm: Normal rate.   Pulmonary:      Effort: Pulmonary effort is normal.   Abdominal:      General: There is no distension.      Palpations: Abdomen is soft. There is no mass.      Tenderness: There is no abdominal tenderness.     Musculoskeletal:         General: Normal range of motion.     Skin:     General: Skin is warm.     Neurological:      General: No focal deficit present.       Evaluation of patient's right arm reveals healing longitudinal incision over his right elbow  joint with a small amount of serous drainage.  There is no tenderness.  He has full range of motion.    Assessment/Plan     Patient has gallstones along with consequences related to sickle cell disease.  We had a lengthy discussion again regarding laparoscopic cholecystectomy with risks explained including bleeding, infection, viscus injury, conversion, bile duct leak or injury, recurrent common bile duct stones, possible need for subsequent endoscopic or surgical intervention, postcholecystectomy diarrhea, and possible continued hyperbilirubinemia.  Complications related to sickle cell disease were also discussed in the preoperative requirement for preadmission testing as well as evaluation by hematology for possible plasma exchange.  He will be scheduled electively for laparoscopic cholecystectomy and intraoperative cholangiography under general anesthesia after being seen in preadmission testing.         Elias Dove MD 07/31/25 4:29 PM

## 2025-08-01 DIAGNOSIS — K80.20 GALLSTONES: ICD-10-CM

## 2025-08-03 ENCOUNTER — APPOINTMENT (OUTPATIENT)
Dept: RADIOLOGY | Facility: HOSPITAL | Age: 25
End: 2025-08-03
Payer: COMMERCIAL

## 2025-08-03 ENCOUNTER — HOSPITAL ENCOUNTER (EMERGENCY)
Facility: HOSPITAL | Age: 25
Discharge: HOME | End: 2025-08-03
Attending: EMERGENCY MEDICINE
Payer: COMMERCIAL

## 2025-08-03 VITALS
BODY MASS INDEX: 19.89 KG/M2 | SYSTOLIC BLOOD PRESSURE: 118 MMHG | OXYGEN SATURATION: 98 % | DIASTOLIC BLOOD PRESSURE: 74 MMHG | RESPIRATION RATE: 18 BRPM | HEIGHT: 74 IN | TEMPERATURE: 97.5 F | HEART RATE: 66 BPM | WEIGHT: 155 LBS

## 2025-08-03 DIAGNOSIS — M25.521 ELBOW PAIN, RIGHT: Primary | ICD-10-CM

## 2025-08-03 LAB
ABO GROUP (TYPE) IN BLOOD: NORMAL
ALBUMIN SERPL BCP-MCNC: 4.8 G/DL (ref 3.4–5)
ALP SERPL-CCNC: 151 U/L (ref 33–120)
ALT SERPL W P-5'-P-CCNC: 52 U/L (ref 10–52)
ANION GAP SERPL CALC-SCNC: 12 MMOL/L (ref 10–20)
ANTIBODY SCREEN: NORMAL
APTT PPP: 35 SECONDS (ref 26–36)
AST SERPL W P-5'-P-CCNC: 61 U/L (ref 9–39)
BASOPHILS # BLD AUTO: 0.1 X10*3/UL (ref 0–0.1)
BASOPHILS NFR BLD AUTO: 0.6 %
BILIRUB SERPL-MCNC: 10.3 MG/DL (ref 0–1.2)
BUN SERPL-MCNC: 4 MG/DL (ref 6–23)
CALCIUM SERPL-MCNC: 9.7 MG/DL (ref 8.6–10.6)
CHLORIDE SERPL-SCNC: 101 MMOL/L (ref 98–107)
CO2 SERPL-SCNC: 29 MMOL/L (ref 21–32)
CREAT SERPL-MCNC: 0.73 MG/DL (ref 0.5–1.3)
CRP SERPL-MCNC: 0.96 MG/DL
EGFRCR SERPLBLD CKD-EPI 2021: >90 ML/MIN/1.73M*2
EOSINOPHIL # BLD AUTO: 1.78 X10*3/UL (ref 0–0.7)
EOSINOPHIL NFR BLD AUTO: 11.1 %
ERYTHROCYTE [DISTWIDTH] IN BLOOD BY AUTOMATED COUNT: 20 % (ref 11.5–14.5)
ERYTHROCYTE [SEDIMENTATION RATE] IN BLOOD BY WESTERGREN METHOD: 3 MM/H (ref 0–15)
GLUCOSE SERPL-MCNC: 79 MG/DL (ref 74–99)
HCT VFR BLD AUTO: 25.6 % (ref 41–52)
HGB BLD-MCNC: 9.1 G/DL (ref 13.5–17.5)
IMM GRANULOCYTES # BLD AUTO: 0.2 X10*3/UL (ref 0–0.7)
IMM GRANULOCYTES NFR BLD AUTO: 1.2 % (ref 0–0.9)
INR PPP: 1.2 (ref 0.9–1.1)
LYMPHOCYTES # BLD AUTO: 4.88 X10*3/UL (ref 1.2–4.8)
LYMPHOCYTES NFR BLD AUTO: 30.4 %
MAGNESIUM SERPL-MCNC: 2.19 MG/DL (ref 1.6–2.4)
MCH RBC QN AUTO: 30.7 PG (ref 26–34)
MCHC RBC AUTO-ENTMCNC: 35.5 G/DL (ref 32–36)
MCV RBC AUTO: 87 FL (ref 80–100)
MONOCYTES # BLD AUTO: 1.37 X10*3/UL (ref 0.1–1)
MONOCYTES NFR BLD AUTO: 8.5 %
NEUTROPHILS # BLD AUTO: 7.74 X10*3/UL (ref 1.2–7.7)
NEUTROPHILS NFR BLD AUTO: 48.2 %
NRBC BLD-RTO: 2.9 /100 WBCS (ref 0–0)
PLATELET # BLD AUTO: 562 X10*3/UL (ref 150–450)
POTASSIUM SERPL-SCNC: 3.9 MMOL/L (ref 3.5–5.3)
PROT SERPL-MCNC: 7.4 G/DL (ref 6.4–8.2)
PROTHROMBIN TIME: 13 SECONDS (ref 9.8–12.4)
RBC # BLD AUTO: 2.96 X10*6/UL (ref 4.5–5.9)
RH FACTOR (ANTIGEN D): NORMAL
SODIUM SERPL-SCNC: 138 MMOL/L (ref 136–145)
WBC # BLD AUTO: 16.1 X10*3/UL (ref 4.4–11.3)

## 2025-08-03 PROCEDURE — 85025 COMPLETE CBC W/AUTO DIFF WBC: CPT

## 2025-08-03 PROCEDURE — 96374 THER/PROPH/DIAG INJ IV PUSH: CPT

## 2025-08-03 PROCEDURE — 87040 BLOOD CULTURE FOR BACTERIA: CPT

## 2025-08-03 PROCEDURE — 73060 X-RAY EXAM OF HUMERUS: CPT | Mod: RIGHT SIDE | Performed by: RADIOLOGY

## 2025-08-03 PROCEDURE — 86901 BLOOD TYPING SEROLOGIC RH(D): CPT

## 2025-08-03 PROCEDURE — 2500000004 HC RX 250 GENERAL PHARMACY W/ HCPCS (ALT 636 FOR OP/ED)

## 2025-08-03 PROCEDURE — 99284 EMERGENCY DEPT VISIT MOD MDM: CPT | Performed by: EMERGENCY MEDICINE

## 2025-08-03 PROCEDURE — 86140 C-REACTIVE PROTEIN: CPT

## 2025-08-03 PROCEDURE — 36415 COLL VENOUS BLD VENIPUNCTURE: CPT

## 2025-08-03 PROCEDURE — 83735 ASSAY OF MAGNESIUM: CPT

## 2025-08-03 PROCEDURE — 85610 PROTHROMBIN TIME: CPT

## 2025-08-03 PROCEDURE — 73090 X-RAY EXAM OF FOREARM: CPT | Mod: RIGHT SIDE | Performed by: RADIOLOGY

## 2025-08-03 PROCEDURE — 84075 ASSAY ALKALINE PHOSPHATASE: CPT

## 2025-08-03 PROCEDURE — 73080 X-RAY EXAM OF ELBOW: CPT | Mod: RIGHT SIDE | Performed by: RADIOLOGY

## 2025-08-03 PROCEDURE — 73080 X-RAY EXAM OF ELBOW: CPT | Mod: RT

## 2025-08-03 PROCEDURE — 73060 X-RAY EXAM OF HUMERUS: CPT | Mod: RT

## 2025-08-03 PROCEDURE — 85730 THROMBOPLASTIN TIME PARTIAL: CPT

## 2025-08-03 PROCEDURE — 85652 RBC SED RATE AUTOMATED: CPT

## 2025-08-03 PROCEDURE — 73090 X-RAY EXAM OF FOREARM: CPT | Mod: RT

## 2025-08-03 PROCEDURE — 2500000001 HC RX 250 WO HCPCS SELF ADMINISTERED DRUGS (ALT 637 FOR MEDICARE OP)

## 2025-08-03 RX ORDER — CLINDAMYCIN HYDROCHLORIDE 150 MG/1
450 CAPSULE ORAL 3 TIMES DAILY
Qty: 45 CAPSULE | Refills: 0 | Status: SHIPPED | OUTPATIENT
Start: 2025-08-03 | End: 2025-08-09

## 2025-08-03 RX ORDER — ONDANSETRON HYDROCHLORIDE 2 MG/ML
4 INJECTION, SOLUTION INTRAVENOUS ONCE
Status: COMPLETED | OUTPATIENT
Start: 2025-08-03 | End: 2025-08-03

## 2025-08-03 RX ORDER — OXYCODONE HYDROCHLORIDE 5 MG/1
10 TABLET ORAL ONCE
Refills: 0 | Status: COMPLETED | OUTPATIENT
Start: 2025-08-03 | End: 2025-08-03

## 2025-08-03 RX ADMIN — OXYCODONE HYDROCHLORIDE 10 MG: 5 TABLET ORAL at 20:20

## 2025-08-03 RX ADMIN — ONDANSETRON 4 MG: 2 INJECTION INTRAMUSCULAR; INTRAVENOUS at 20:20

## 2025-08-03 ASSESSMENT — PAIN SCALES - GENERAL: PAINLEVEL_OUTOF10: 8

## 2025-08-03 ASSESSMENT — PAIN - FUNCTIONAL ASSESSMENT: PAIN_FUNCTIONAL_ASSESSMENT: 0-10

## 2025-08-03 ASSESSMENT — PAIN DESCRIPTION - ORIENTATION: ORIENTATION: RIGHT

## 2025-08-03 ASSESSMENT — PAIN DESCRIPTION - PAIN TYPE: TYPE: ACUTE PAIN

## 2025-08-03 ASSESSMENT — PAIN DESCRIPTION - LOCATION: LOCATION: ELBOW

## 2025-08-03 NOTE — ED TRIAGE NOTES
Pt presents to ED for wound on R elbow that is leaking serosanguinous fluid x 1.5 week.  PT reports that he hit it on a hospital bed during last hospitalization, didn't worry about it too much, wound started to get worse after DC, pt followed up with would care who gave pt supplies and instructions with no alleviation of symptoms. Pt denies fever, chills, N/V, shortness of breathe and chst pain. PT has a hx of Nodular lymphocyte predominant Hodgkin lymphoma, Sickle cell disease - currently no complaints. PT is A[facility]4

## 2025-08-03 NOTE — CONSULTS
"ORTHOPAEDIC SURGERY CONSULT NOTE     HPI:   Orthopaedic Problems/Injuries: post-op wound check    Other Injuries: None    24M (sickle cell anemia w recurrent pain crises, Hodgkin’s lymphoma s/p rituxan/prednisone not on current chemo, SA s/p I&D R elbow w Dr. Tello 5/10) p/w 2w persistent R elbow drainage. Reports hitting elbow on bed during admission for cholecystitis 7/14 w persistent serosanguenous drainage since. Seen by wound care IP w recs for MediHoney and daily Mepilex changes. Denies systemic sx.     PMH: per HPI/EMR  PSH: per HPI/EMR  SocHx: Reviewed in EMR   Ambulatory Status: Community ambulator without assistive devices   FamHx:  Non-contributory to this patient's acute orthopaedic problem other than as mentioned in HPI  Allergies:   Allergies  Reviewed by Bird Boykin RN on 8/3/2025        Severity Reactions Comments    Cefepime Medium Hallucinations     Amoxicillin Low Hives     Ceftriaxone Low Hives, Rash           Medications: Denies home anticoagulation use   Current Outpatient Medications   Medication Instructions    folic acid (FOLVITE) 1 mg, oral, Daily    OxyCONTIN 30 mg, oral, Every 12 hours scheduled, Do not crush, chew, or split.    oxygen (O2) gas therapy 1 L, Nightly    scopolamine (Transderm-Scop) 1 mg over 3 days patch 3 day 1 patch, transdermal, Every 72 hours     ROS: 14 point ROS negative except as above    OBJECTIVE:  /71 (BP Location: Left arm, Patient Position: Sitting)   Pulse 72   Temp 36.4 °C (97.5 °F) (Oral)   Resp 16   Ht 1.88 m (6' 2\")   Wt 70.3 kg (155 lb)   SpO2 95%   BMI 19.90 kg/m²     PHYSICAL EXAM    Gen: NAD  HEENT: normocephalic atraumatic  Psych: appropriate mood and affect  Resp: nonlabored breathing    Cardiac: extremities WWP    Neuro: alert and oriented   Skin: no rashes    MSK:  Right Upper Extremity:   -Right elbow incision well-approximated, with small area of central superficial dehiscence with granulation tissue formation.  Does not probe " deep.  Small amount of serosanguineous expressible drainage, no purulence.  No palpable fluctuance, no maren-incisional erythema.   -Minimal tenderness to palpation of the right elbow  -Fires in AIN/PIN/ulnar nerve distributions   -SILT in axillary/radial/median/ulnar distributions   -Hand warm, well perfused  -Palpable radial pulse  -Compartments soft and compressible     A full secondary exam was performed and all relevant findings discussed and noted above.    IMAGING:  X- and advanced imaging reveal the following injuries:   - X-ray imaging of the right elbow demonstrates no evidence of acute fracture or malalignment, no evidence of large effusion    ASSESSMENT:   Orthopaedic Problems/Injuries:   - Postop wound drainage    24M (sickle cell anemia w recurrent pain crises, Hodgkin’s lymphoma s/p rituxan/prednisone not on current chemo, SA s/p I&D R elbow w Dr. Tello 5/10) p/w 2w persistent R elbow drainage. Reports hitting elbow on bed during admission for cholecystitis 7/14 w persistent serosanguenous drainage since. Seen by wound care IP w recs for MediHoney and daily dressing changes. Denies systemic sx. Incision well approximated w small area of central granulation tissues, does not probe deep. No palpable fluctuance or erythema. NVI. Completed course of daptomycin per ID 6/20. WBC 16.1, ESR 3, CRP 0.96. XR wo fx or large effusion.      PLAN:  - No acute orthopaedic intervention  - Continue daily Medihoney and Mepilex dressing changes  - Please place consult for outpatient wound care consult  - Weight bearing status: WBAT RUE   - Antibiotics: None indicated , status post daptomycin per ID  - Analgesia per ED/Primary  - F/U with Dr. Pillo Tello  as scheduled. Call [phone] to schedule appointment.  - Please don't hesitate to page with questions     DISPOSITION: Per ED     This patient was staffed with the attending physician, Dr. Pillo Medellin MD   Orthopedic Surgery PGY-2  [facility]  [facility]     While admitted, this patient will be followed by the Orthopedic Trauma Team. Please contact the residents listed below with any questions.    For urgent matters at any time, or for any needs between 6 PM and 6 AM Monday through Friday, on weekends, or on holidays, please page the Orthopaedic Surgery resident on call at [phone].    Trauma:  First Call: Kym Veras  PGY-1/Chaz Irizarry, PGY-1  Second Call: Ellie Pastrana, PGY-2  Third Call: Milo Earl, PGY-3

## 2025-08-03 NOTE — PROGRESS NOTES
Emergency Department Transition of Care Note       Signout   I received Joellen Narayan in signout from Dr. Moe Hill PGY1.  Please see the ED Provider Note for all HPI, PE and MDM up to the time of signout at 1900.  This is in addition to the primary record.    In brief Joellen Narayan is an 24 y.o. male with PMHx of sickle cell disease and Hodgkins Lymphoma presenting for serosanginous drainage from his left elbow.     At the time of signout we were awaiting:  Infectious labwork, x-ray, and orthopaedic consult    ED Course & Medical Decision Making   Medical Decision Making:  Under my care, ESR and CRP came back negative, however WBC was elevated. Concern for impetigo vs allergic dermatitis. Deep tissue infection unlikely given normal ESR and CRP and lack of pain with movement.    Xrays were unremarkable and his orthopaedic care team was unconcerned with them. They determined outpatient follow-up was appropriate    Outpatient treatment with clindamycin was given for presumptive impetigo, and the patient was advised to avoid using creams and soaps that could irritate the wound or cause an allergic reaction. Follow-up with wound care outpatient ordered.      ED Course:  ED Course as of 08/03/25 2235   Sun Aug 03, 2025   2024 C-Reactive Protein: 0.96  Unremarkable [JG]   2025 Sed Rate: 3  unremarkable [JG]   2025 XR elbow right 3+ views  Largely unchanged [JG]      ED Course User Index  [JG] Giancarlo Kapadia MD         Diagnoses as of 08/03/25 2235   Elbow pain, right       Disposition   Discharged home with wound care and orthopaedic follow-up. The patient was given the opportunity to ask questions and was agreeable to the treatment plan.    Procedures   none    Patient seen and discussed with ED attending physician.    Giancarlo Kapadia MD  Emergency Medicine

## 2025-08-03 NOTE — ED PROVIDER NOTES
Emergency Department Provider Note       History of Present Illness     History provided by: Patient  Limitations to History: None  External Records Reviewed with Brief Summary: Discharge Summary from 5/26/2025 which showed septic arthritis s/p I&D of the right elbow on 5/10 (s/p IV daptomycin 6mg/kg q24h and ertapenem 1g q24h until 6/20/25)    HPI:  Joellen Narayan is a 24 y.o. 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  Patient reports that during his stay in the hospital in July he bumped his elbow on the hospital bed, causing his surgical wound to dehisce and has been draining serosanguineous fluid since this time with no resolution.  He finished his home course of IV daptomycin and ertapenem on 6/20/2025 and is not currently on antibiotics.  He reports that the drainage has been continuous yellow fluid and he also complains of new onset itchiness, burning as well as the bumps appearing around the elbow.  Patient denies any fevers, chills, pain, numbness, tingling, weakness or radiation of symptoms.    Physical Exam   Triage vitals:  T 36.4 °C (97.5 °F)  HR 72  /71  RR 16  O2 95 % None (Room air)    General: Awake, alert, in no acute distress  Eyes: Gaze conjugate.  Scleral icterus present  HENT: Normo-cephalic, atraumatic. No stridor  CV: Regular rate, regular rhythm. Radial pulses 2+ bilaterally  Resp: Breathing non-labored, speaking in full sentences.  Clear to auscultation bilaterally  GI: Soft, non-distended, non-tender. No rebound or guarding.  MSK/Extremities: 2 cm linear dehisced incision along right elbow with yellow drainage.  No pus drainage or fluctuance.   Nontender to palpation.  Normal range of motion.  Skin: Warm. Appropriate color  Neuro: Alert. Oriented. Face symmetric. Speech is fluent.  Gross strength and sensation intact in b/l UE and LEs  Psych: Appropriate mood and affect      Medical Decision Making & ED Course   Medical Decision Makin y.o. male presents with worsening right wound s/p I&D of septic arthritis on 5/10.  On arrival, patient is stable and not in acute distress.  History and exam concerning for worsening/persistent osteomyelitis of the right elbow in the setting of immunodeficiency due to complex medical history.  Orthopedic surgery team made aware of patient. Patient was signed out in stable condition with x-ray and infectious labs pending.   ----      Differential diagnoses considered include but are not limited to: Septic arthritis, osteomyelitis, abscess    Social Determinants of Health which Significantly Impact Care: Social Determinants of Health which Significantly Impact Care: None identified     Independent Result Review and Interpretation: Relevant laboratory and radiographic results were reviewed and independently interpreted by myself.  As necessary, they are commented on in the ED Course.    Chronic conditions affecting the patient's care: As documented above in [facility]    The patient was discussed with the following consultants/services: Orthopedic surgery    Care Considerations: As documented above in [facility]    ED Course:       Disposition   Patient was signed out to Dr. Giancarlo Kapadia at 19:00 pending completion of their work-up.  Please see the next provider's transition of care note for the remainder of the patient's care.     Procedures   Procedures    Patient seen and discussed with ED attending physician.    Moe Hill DO  Emergency Medicine                                                       Moe Hill DO  Resident  [phone]

## 2025-08-04 ENCOUNTER — PHARMACY VISIT (OUTPATIENT)
Dept: PHARMACY | Facility: CLINIC | Age: 25
End: 2025-08-04
Payer: MEDICARE

## 2025-08-04 PROCEDURE — RXMED WILLOW AMBULATORY MEDICATION CHARGE

## 2025-08-04 NOTE — DISCHARGE INSTRUCTIONS
Take your antibiotics as prescribed for possible impetigo  Use unscented soap and aloe vera to dress your wound until instructed otherwise     Return for worsening or unresolved pain (especially pain with motion) to the wound  Return if the discharge changes in quantity or color  Return if the wound becomes inflamed, swollen, or emits a foul smell   Return for fever, nausea, vomiting, diarrhea  Return for any other concerning symptoms

## 2025-08-07 LAB — BACTERIA BLD CULT: NORMAL

## 2025-08-08 ENCOUNTER — OFFICE VISIT (OUTPATIENT)
Dept: WOUND CARE | Facility: CLINIC | Age: 25
End: 2025-08-08
Payer: COMMERCIAL

## 2025-08-08 PROCEDURE — 11042 DBRDMT SUBQ TIS 1ST 20SQCM/<: CPT

## 2025-08-08 PROCEDURE — 99213 OFFICE O/P EST LOW 20 MIN: CPT

## 2025-08-12 ENCOUNTER — APPOINTMENT (OUTPATIENT)
Dept: PULMONOLOGY | Facility: HOSPITAL | Age: 25
End: 2025-08-12
Payer: COMMERCIAL

## 2025-08-18 ENCOUNTER — OFFICE VISIT (OUTPATIENT)
Dept: ORTHOPEDIC SURGERY | Facility: HOSPITAL | Age: 25
End: 2025-08-18
Payer: COMMERCIAL

## 2025-08-18 ENCOUNTER — OFFICE VISIT (OUTPATIENT)
Dept: PULMONOLOGY | Facility: HOSPITAL | Age: 25
End: 2025-08-18
Payer: COMMERCIAL

## 2025-08-18 ENCOUNTER — HOSPITAL ENCOUNTER (OUTPATIENT)
Dept: RADIOLOGY | Facility: HOSPITAL | Age: 25
Discharge: HOME | End: 2025-08-18
Payer: COMMERCIAL

## 2025-08-18 VITALS
HEART RATE: 68 BPM | OXYGEN SATURATION: 91 % | WEIGHT: 157 LBS | BODY MASS INDEX: 20.16 KG/M2 | DIASTOLIC BLOOD PRESSURE: 74 MMHG | TEMPERATURE: 97.9 F | SYSTOLIC BLOOD PRESSURE: 133 MMHG

## 2025-08-18 DIAGNOSIS — M71.121 SEPTIC BURSITIS OF ELBOW, RIGHT: ICD-10-CM

## 2025-08-18 DIAGNOSIS — M86.9 OSTEOMYELITIS, UNSPECIFIED SITE, UNSPECIFIED TYPE (MULTI): ICD-10-CM

## 2025-08-18 DIAGNOSIS — J96.11 HYPOXEMIC RESPIRATORY FAILURE, CHRONIC: Primary | ICD-10-CM

## 2025-08-18 PROCEDURE — 73080 X-RAY EXAM OF ELBOW: CPT | Mod: RT

## 2025-08-18 PROCEDURE — 99212 OFFICE O/P EST SF 10 MIN: CPT | Performed by: ORTHOPAEDIC SURGERY

## 2025-08-18 PROCEDURE — 99213 OFFICE O/P EST LOW 20 MIN: CPT | Performed by: INTERNAL MEDICINE

## 2025-08-18 RX ORDER — ALBUTEROL SULFATE 0.83 MG/ML
3 SOLUTION RESPIRATORY (INHALATION) ONCE
OUTPATIENT
Start: 2025-08-18 | End: 2025-08-18

## 2025-08-18 RX ORDER — ALBUTEROL SULFATE 90 UG/1
1 INHALANT RESPIRATORY (INHALATION) ONCE
OUTPATIENT
Start: 2025-08-18

## 2025-08-18 ASSESSMENT — PAIN SCALES - GENERAL: PAINLEVEL_OUTOF10: 0-NO PAIN

## 2025-08-29 ENCOUNTER — PATIENT OUTREACH (OUTPATIENT)
Dept: CARE COORDINATION | Facility: CLINIC | Age: 25
End: 2025-08-29
Payer: COMMERCIAL

## 2025-08-29 ENCOUNTER — HOSPITAL ENCOUNTER (EMERGENCY)
Facility: HOSPITAL | Age: 25
Discharge: HOME | End: 2025-08-29
Payer: COMMERCIAL

## 2025-08-29 ASSESSMENT — LIFESTYLE VARIABLES
HAVE YOU EVER FELT YOU SHOULD CUT DOWN ON YOUR DRINKING: NO
EVER FELT BAD OR GUILTY ABOUT YOUR DRINKING: NO
HAVE PEOPLE ANNOYED YOU BY CRITICIZING YOUR DRINKING: NO
EVER HAD A DRINK FIRST THING IN THE MORNING TO STEADY YOUR NERVES TO GET RID OF A HANGOVER: NO
TOTAL SCORE: 0

## 2025-08-29 ASSESSMENT — PAIN DESCRIPTION - LOCATION: LOCATION: NOSE

## 2025-08-29 ASSESSMENT — PAIN DESCRIPTION - PAIN TYPE: TYPE: ACUTE PAIN

## 2025-08-29 ASSESSMENT — PAIN - FUNCTIONAL ASSESSMENT: PAIN_FUNCTIONAL_ASSESSMENT: 0-10

## 2025-08-29 ASSESSMENT — PAIN SCALES - GENERAL: PAINLEVEL_OUTOF10: 8

## (undated) DEVICE — MANIFOLD, 4 PORT NEPTUNE STANDARD

## (undated) DEVICE — ADHESIVE, SKIN, LIQUIBAND EXCEED

## (undated) DEVICE — TOWELS 4-PK

## (undated) DEVICE — BANDAGE, COFLEX, 4 X 5 YDS, TAN, STERILE, LF

## (undated) DEVICE — DRAPE, SHEET, LAPAROTOMY, W/ISO-BAC, W/ARMBOARD COVERS, 98 X 122 IN, DISPOSABLE, LF, STERILE

## (undated) DEVICE — APPLICATOR, CHLORAPREP, W/ORANGE TINT, 26ML

## (undated) DEVICE — BANDAGE, ELASTIC, SELF-CLOSE, 6 IN, HONEYCOMB, STERILE

## (undated) DEVICE — SPONGE, LAP, XRAY DECT, 18IN X 18IN, W/LOOP, STERILE

## (undated) DEVICE — BANDAGE, ELASTIC, MATRIX, SELF-CLOSURE, 4 IN X 5 YD, LF

## (undated) DEVICE — Device

## (undated) DEVICE — COVER, CART, 45 X 27 X 48 IN, CLEAR

## (undated) DEVICE — IRRIGATION SET, Y, LARGE BORE

## (undated) DEVICE — GUIDEWIRE, 2.2 X 800 SMOOTH TIP

## (undated) DEVICE — SPONGE GAUZE, XRAY SC+RFID, 4X4 16 PLY, STERILE

## (undated) DEVICE — BANDAGE, GAUZE, CONFORMING, KERLIX, 6 PLY, 4.5 IN X 4.1 YD

## (undated) DEVICE — DRESSING, MEPILEX BORDER, POST-OP AG, 4 X 12 IN

## (undated) DEVICE — DRESSING, GAUZE, WASHED FLUFF, LARGE, STERILE

## (undated) DEVICE — CLIPPER, SURGICAL BLADE ASSEMBLY, GENERAL PURPOSE, SINGLE USE

## (undated) DEVICE — DRAPE, SHEET, U, W/ADHESIVE STRIP, IMPERVIOUS, 60 X 70 IN, DISPOSABLE, LF, STERILE

## (undated) DEVICE — SUPPORTER, ATHLETIC, ADULT, X-LARGE